# Patient Record
Sex: FEMALE | Race: BLACK OR AFRICAN AMERICAN | NOT HISPANIC OR LATINO | URBAN - METROPOLITAN AREA
[De-identification: names, ages, dates, MRNs, and addresses within clinical notes are randomized per-mention and may not be internally consistent; named-entity substitution may affect disease eponyms.]

---

## 2020-07-10 ENCOUNTER — INPATIENT (INPATIENT)
Facility: HOSPITAL | Age: 50
LOS: 3 days | Discharge: HOME HEALTH | End: 2020-07-14
Attending: HOSPITALIST | Admitting: HOSPITALIST
Payer: MEDICARE

## 2020-07-10 VITALS
OXYGEN SATURATION: 97 % | WEIGHT: 272.05 LBS | TEMPERATURE: 98 F | RESPIRATION RATE: 15 BRPM | HEIGHT: 68 IN | SYSTOLIC BLOOD PRESSURE: 122 MMHG | HEART RATE: 67 BPM | DIASTOLIC BLOOD PRESSURE: 60 MMHG

## 2020-07-10 LAB
ALBUMIN SERPL ELPH-MCNC: 3.2 G/DL — LOW (ref 3.3–5)
ALP SERPL-CCNC: 142 U/L — HIGH (ref 40–120)
ALT FLD-CCNC: 23 U/L — SIGNIFICANT CHANGE UP (ref 12–78)
ANION GAP SERPL CALC-SCNC: 7 MMOL/L — SIGNIFICANT CHANGE UP (ref 5–17)
AST SERPL-CCNC: 19 U/L — SIGNIFICANT CHANGE UP (ref 15–37)
BASOPHILS # BLD AUTO: 0.03 K/UL — SIGNIFICANT CHANGE UP (ref 0–0.2)
BASOPHILS NFR BLD AUTO: 0.2 % — SIGNIFICANT CHANGE UP (ref 0–2)
BILIRUB SERPL-MCNC: 0.5 MG/DL — SIGNIFICANT CHANGE UP (ref 0.2–1.2)
BUN SERPL-MCNC: 12 MG/DL — SIGNIFICANT CHANGE UP (ref 7–23)
CALCIUM SERPL-MCNC: 8.9 MG/DL — SIGNIFICANT CHANGE UP (ref 8.5–10.1)
CHLORIDE SERPL-SCNC: 104 MMOL/L — SIGNIFICANT CHANGE UP (ref 96–108)
CO2 SERPL-SCNC: 29 MMOL/L — SIGNIFICANT CHANGE UP (ref 22–31)
CREAT SERPL-MCNC: 0.72 MG/DL — SIGNIFICANT CHANGE UP (ref 0.5–1.3)
EOSINOPHIL # BLD AUTO: 0.08 K/UL — SIGNIFICANT CHANGE UP (ref 0–0.5)
EOSINOPHIL NFR BLD AUTO: 0.4 % — SIGNIFICANT CHANGE UP (ref 0–6)
GLUCOSE BLDC GLUCOMTR-MCNC: 126 MG/DL — HIGH (ref 70–99)
GLUCOSE BLDC GLUCOMTR-MCNC: 180 MG/DL — HIGH (ref 70–99)
GLUCOSE SERPL-MCNC: 174 MG/DL — HIGH (ref 70–99)
HCG SERPL-ACNC: <1 MIU/ML — SIGNIFICANT CHANGE UP
HCT VFR BLD CALC: 37.6 % — SIGNIFICANT CHANGE UP (ref 34.5–45)
HGB BLD-MCNC: 12.8 G/DL — SIGNIFICANT CHANGE UP (ref 11.5–15.5)
IMM GRANULOCYTES NFR BLD AUTO: 0.4 % — SIGNIFICANT CHANGE UP (ref 0–1.5)
LIDOCAIN IGE QN: 154 U/L — SIGNIFICANT CHANGE UP (ref 73–393)
LYMPHOCYTES # BLD AUTO: 1.29 K/UL — SIGNIFICANT CHANGE UP (ref 1–3.3)
LYMPHOCYTES # BLD AUTO: 7.2 % — LOW (ref 13–44)
MAGNESIUM SERPL-MCNC: 1.8 MG/DL — SIGNIFICANT CHANGE UP (ref 1.6–2.6)
MCHC RBC-ENTMCNC: 30.8 PG — SIGNIFICANT CHANGE UP (ref 27–34)
MCHC RBC-ENTMCNC: 34 GM/DL — SIGNIFICANT CHANGE UP (ref 32–36)
MCV RBC AUTO: 90.6 FL — SIGNIFICANT CHANGE UP (ref 80–100)
MONOCYTES # BLD AUTO: 0.96 K/UL — HIGH (ref 0–0.9)
MONOCYTES NFR BLD AUTO: 5.4 % — SIGNIFICANT CHANGE UP (ref 2–14)
NEUTROPHILS # BLD AUTO: 15.42 K/UL — HIGH (ref 1.8–7.4)
NEUTROPHILS NFR BLD AUTO: 86.4 % — HIGH (ref 43–77)
NRBC # BLD: 0 /100 WBCS — SIGNIFICANT CHANGE UP (ref 0–0)
PLATELET # BLD AUTO: 504 K/UL — HIGH (ref 150–400)
POTASSIUM SERPL-MCNC: 3.4 MMOL/L — LOW (ref 3.5–5.3)
POTASSIUM SERPL-SCNC: 3.4 MMOL/L — LOW (ref 3.5–5.3)
PROT SERPL-MCNC: 8 GM/DL — SIGNIFICANT CHANGE UP (ref 6–8.3)
RBC # BLD: 4.15 M/UL — SIGNIFICANT CHANGE UP (ref 3.8–5.2)
RBC # FLD: 14 % — SIGNIFICANT CHANGE UP (ref 10.3–14.5)
SARS-COV-2 RNA SPEC QL NAA+PROBE: SIGNIFICANT CHANGE UP
SODIUM SERPL-SCNC: 140 MMOL/L — SIGNIFICANT CHANGE UP (ref 135–145)
WBC # BLD: 17.86 K/UL — HIGH (ref 3.8–10.5)
WBC # FLD AUTO: 17.86 K/UL — HIGH (ref 3.8–10.5)

## 2020-07-10 PROCEDURE — 74177 CT ABD & PELVIS W/CONTRAST: CPT | Mod: 26

## 2020-07-10 PROCEDURE — 93010 ELECTROCARDIOGRAM REPORT: CPT

## 2020-07-10 PROCEDURE — 99285 EMERGENCY DEPT VISIT HI MDM: CPT

## 2020-07-10 PROCEDURE — 99223 1ST HOSP IP/OBS HIGH 75: CPT

## 2020-07-10 RX ORDER — CIPROFLOXACIN LACTATE 400MG/40ML
500 VIAL (ML) INTRAVENOUS ONCE
Refills: 0 | Status: DISCONTINUED | OUTPATIENT
Start: 2020-07-10 | End: 2020-07-10

## 2020-07-10 RX ORDER — SODIUM CHLORIDE 9 MG/ML
1000 INJECTION INTRAMUSCULAR; INTRAVENOUS; SUBCUTANEOUS ONCE
Refills: 0 | Status: COMPLETED | OUTPATIENT
Start: 2020-07-10 | End: 2020-07-10

## 2020-07-10 RX ORDER — METRONIDAZOLE 500 MG
500 TABLET ORAL ONCE
Refills: 0 | Status: DISCONTINUED | OUTPATIENT
Start: 2020-07-10 | End: 2020-07-10

## 2020-07-10 RX ORDER — KETOROLAC TROMETHAMINE 30 MG/ML
30 SYRINGE (ML) INJECTION ONCE
Refills: 0 | Status: DISCONTINUED | OUTPATIENT
Start: 2020-07-10 | End: 2020-07-10

## 2020-07-10 RX ORDER — POTASSIUM CHLORIDE 20 MEQ
40 PACKET (EA) ORAL EVERY 4 HOURS
Refills: 0 | Status: COMPLETED | OUTPATIENT
Start: 2020-07-10 | End: 2020-07-10

## 2020-07-10 RX ORDER — METOCLOPRAMIDE HCL 10 MG
10 TABLET ORAL ONCE
Refills: 0 | Status: COMPLETED | OUTPATIENT
Start: 2020-07-10 | End: 2020-07-10

## 2020-07-10 RX ORDER — LIDOCAINE 4 G/100G
1 CREAM TOPICAL ONCE
Refills: 0 | Status: COMPLETED | OUTPATIENT
Start: 2020-07-10 | End: 2020-07-10

## 2020-07-10 RX ORDER — LIDOCAINE 4 G/100G
1 CREAM TOPICAL DAILY
Refills: 0 | Status: DISCONTINUED | OUTPATIENT
Start: 2020-07-10 | End: 2020-07-14

## 2020-07-10 RX ORDER — HEPARIN SODIUM 5000 [USP'U]/ML
5000 INJECTION INTRAVENOUS; SUBCUTANEOUS EVERY 12 HOURS
Refills: 0 | Status: DISCONTINUED | OUTPATIENT
Start: 2020-07-10 | End: 2020-07-14

## 2020-07-10 RX ORDER — ONDANSETRON 8 MG/1
4 TABLET, FILM COATED ORAL EVERY 8 HOURS
Refills: 0 | Status: DISCONTINUED | OUTPATIENT
Start: 2020-07-10 | End: 2020-07-14

## 2020-07-10 RX ORDER — MORPHINE SULFATE 50 MG/1
6 CAPSULE, EXTENDED RELEASE ORAL ONCE
Refills: 0 | Status: DISCONTINUED | OUTPATIENT
Start: 2020-07-10 | End: 2020-07-10

## 2020-07-10 RX ORDER — CIPROFLOXACIN LACTATE 400MG/40ML
400 VIAL (ML) INTRAVENOUS ONCE
Refills: 0 | Status: COMPLETED | OUTPATIENT
Start: 2020-07-10 | End: 2020-07-10

## 2020-07-10 RX ORDER — MORPHINE SULFATE 50 MG/1
2 CAPSULE, EXTENDED RELEASE ORAL EVERY 4 HOURS
Refills: 0 | Status: DISCONTINUED | OUTPATIENT
Start: 2020-07-10 | End: 2020-07-14

## 2020-07-10 RX ORDER — ONDANSETRON 8 MG/1
4 TABLET, FILM COATED ORAL ONCE
Refills: 0 | Status: DISCONTINUED | OUTPATIENT
Start: 2020-07-10 | End: 2020-07-10

## 2020-07-10 RX ORDER — ARIPIPRAZOLE 15 MG/1
15 TABLET ORAL DAILY
Refills: 0 | Status: DISCONTINUED | OUTPATIENT
Start: 2020-07-10 | End: 2020-07-14

## 2020-07-10 RX ORDER — DEXTROSE MONOHYDRATE, SODIUM CHLORIDE, AND POTASSIUM CHLORIDE 50; .745; 4.5 G/1000ML; G/1000ML; G/1000ML
1000 INJECTION, SOLUTION INTRAVENOUS
Refills: 0 | Status: DISCONTINUED | OUTPATIENT
Start: 2020-07-10 | End: 2020-07-14

## 2020-07-10 RX ORDER — MORPHINE SULFATE 50 MG/1
4 CAPSULE, EXTENDED RELEASE ORAL ONCE
Refills: 0 | Status: DISCONTINUED | OUTPATIENT
Start: 2020-07-10 | End: 2020-07-10

## 2020-07-10 RX ORDER — INSULIN GLARGINE 100 [IU]/ML
25 INJECTION, SOLUTION SUBCUTANEOUS AT BEDTIME
Refills: 0 | Status: DISCONTINUED | OUTPATIENT
Start: 2020-07-10 | End: 2020-07-11

## 2020-07-10 RX ORDER — ASPIRIN/CALCIUM CARB/MAGNESIUM 324 MG
81 TABLET ORAL DAILY
Refills: 0 | Status: DISCONTINUED | OUTPATIENT
Start: 2020-07-10 | End: 2020-07-14

## 2020-07-10 RX ADMIN — LIDOCAINE 1 PATCH: 4 CREAM TOPICAL at 18:13

## 2020-07-10 RX ADMIN — MORPHINE SULFATE 6 MILLIGRAM(S): 50 CAPSULE, EXTENDED RELEASE ORAL at 11:08

## 2020-07-10 RX ADMIN — LIDOCAINE 1 PATCH: 4 CREAM TOPICAL at 11:04

## 2020-07-10 RX ADMIN — INSULIN GLARGINE 25 UNIT(S): 100 INJECTION, SOLUTION SUBCUTANEOUS at 22:16

## 2020-07-10 RX ADMIN — MORPHINE SULFATE 2 MILLIGRAM(S): 50 CAPSULE, EXTENDED RELEASE ORAL at 19:59

## 2020-07-10 RX ADMIN — HEPARIN SODIUM 5000 UNIT(S): 5000 INJECTION INTRAVENOUS; SUBCUTANEOUS at 18:12

## 2020-07-10 RX ADMIN — SODIUM CHLORIDE 1000 MILLILITER(S): 9 INJECTION INTRAMUSCULAR; INTRAVENOUS; SUBCUTANEOUS at 12:33

## 2020-07-10 RX ADMIN — DEXTROSE MONOHYDRATE, SODIUM CHLORIDE, AND POTASSIUM CHLORIDE 100 MILLILITER(S): 50; .745; 4.5 INJECTION, SOLUTION INTRAVENOUS at 22:17

## 2020-07-10 RX ADMIN — Medication 40 MILLIEQUIVALENT(S): at 18:12

## 2020-07-10 RX ADMIN — Medication 40 MILLIEQUIVALENT(S): at 22:17

## 2020-07-10 RX ADMIN — LIDOCAINE 1 PATCH: 4 CREAM TOPICAL at 23:08

## 2020-07-10 RX ADMIN — Medication 150 MILLIGRAM(S): at 18:12

## 2020-07-10 RX ADMIN — Medication 10 MILLIGRAM(S): at 11:08

## 2020-07-10 RX ADMIN — ONDANSETRON 4 MILLIGRAM(S): 8 TABLET, FILM COATED ORAL at 18:45

## 2020-07-10 RX ADMIN — Medication 200 MILLIGRAM(S): at 14:53

## 2020-07-10 RX ADMIN — MORPHINE SULFATE 4 MILLIGRAM(S): 50 CAPSULE, EXTENDED RELEASE ORAL at 14:53

## 2020-07-10 RX ADMIN — Medication 30 MILLIGRAM(S): at 14:53

## 2020-07-10 RX ADMIN — SODIUM CHLORIDE 1000 MILLILITER(S): 9 INJECTION INTRAMUSCULAR; INTRAVENOUS; SUBCUTANEOUS at 11:13

## 2020-07-10 NOTE — H&P ADULT - HISTORY OF PRESENT ILLNESS
50 year old female PMH intercostal neuralgia, obesity, HTN, p/w vomiting for past 24 hours. Notes she is having a flare of her intercostal neuralgia but as a result of covid concerns she hasn't been able to come to the hospital. She has been being treated by pmd in the clinic but that hasn't been enough over the past month. She notes severe pain in the right flank area up to the shoulder. When the flares get bad she develops nausea and vomiting. she also notes having loose stools at the moment. She notes she also has a diffuse HA that is typical for her.  Admitted for gastroenteritis and pain control.

## 2020-07-10 NOTE — H&P ADULT - ASSESSMENT
50 year old female PMH intercostal neuralgia, obesity, HTN, p/w vomiting for past 24 hours. Notes she is having a flare of her intercostal neuralgia but as a result of covid concerns she hasn't been able to come to the hospital. She has been being treated by pmd in the clinic but that hasn't been enough over the past month. She notes severe pain in the right flank area up to the shoulder. When the flares get bad she develops nausea and vomiting. she also notes having loose stools at the moment. She notes she also has a diffuse HA that is typical for her.  Admitted for gastroenteritis and pain control.     Ortho: Continue Lyrica 150 mg/day, MSO4 prn IVP and lidocaine patch.     GI: IVF and IV protonix.     Endo: Hold all oral agents, Lantus 25 units at night. 50 year old female PMH intercostal neuralgia, obesity, HTN, p/w vomiting for past 24 hours. Notes she is having a flare of her intercostal neuralgia but as a result of covid concerns she hasn't been able to come to the hospital. She has been being treated by pmd in the clinic but that hasn't been enough over the past month. She notes severe pain in the right flank area up to the shoulder. When the flares get bad she develops nausea and vomiting. she also notes having loose stools at the moment. She notes she also has a diffuse HA that is typical for her.  Admitted for gastroenteritis and pain control.     Neuro: Continue Lyrica 150 mg/day, MSO4 prn IVP and lidocaine patch. Continue Ambilify 15 mg/day at home dose.      GI: CT confirms gastroenteritis. IVF and IV protonix. Replace KCL. Advance diet as tolerated.     Endo: Hold all oral agents, Lantus 25 units at night.

## 2020-07-10 NOTE — H&P ADULT - NSHPPHYSICALEXAM_GEN_ALL_CORE
PHYSICAL EXAMINATION:  Vital Signs Last 24 Hrs  T(C): 36.5 (10 Jul 2020 14:36), Max: 36.5 (10 Jul 2020 14:36)  T(F): 97.7 (10 Jul 2020 14:36), Max: 97.7 (10 Jul 2020 14:36)  HR: 80 (10 Jul 2020 14:36) (67 - 80)  BP: 120/66 (10 Jul 2020 14:36) (105/53 - 122/60)  BP(mean): --  RR: 20 (10 Jul 2020 14:36) (15 - 20)  SpO2: 99% (10 Jul 2020 14:36) (97% - 99%)  CAPILLARY BLOOD GLUCOSE      POCT Blood Glucose.: 180 mg/dL (10 Jul 2020 10:44)      GENERAL: NAD, well-groomed, well-developed  HEAD:  atraumatic, normocephalic  EYES: sclera anicteric  ENMT: mucous membranes moist  NECK: supple, No JVD  CHEST/LUNG: clear to auscultation bilaterally; no rales, rhonchi, or wheezing b/l  HEART: normal S1, S2  ABDOMEN: BS+, soft, ND, NT   EXTREMITIES:  pulses palpable; no clubbing, cyanosis, or edema b/l LEs  NEURO: awake, alert, interactive; moves all extremities  SKIN: no rashes or lesions PHYSICAL EXAMINATION:  Vital Signs Last 24 Hrs  T(C): 36.5 (10 Jul 2020 14:36), Max: 36.5 (10 Jul 2020 14:36)  T(F): 97.7 (10 Jul 2020 14:36), Max: 97.7 (10 Jul 2020 14:36)  HR: 80 (10 Jul 2020 14:36) (67 - 80)  BP: 120/66 (10 Jul 2020 14:36) (105/53 - 122/60)  BP(mean): --  RR: 20 (10 Jul 2020 14:36) (15 - 20)  SpO2: 99% (10 Jul 2020 14:36) (97% - 99%)  CAPILLARY BLOOD GLUCOSE      POCT Blood Glucose.: 180 mg/dL (10 Jul 2020 10:44)      GENERAL: NAD, well-groomed, well-developed, seen in ER,   HEAD:  atraumatic, normocephalic  EYES: sclera anicteric  ENMT: mucous membranes moist  NECK: supple, No JVD  CHEST/LUNG: clear to auscultation bilaterally; no rales, rhonchi, or wheezing b/l  HEART: normal S1, S2  ABDOMEN: BS+, soft, ND, NT   EXTREMITIES:  pulses palpable; no clubbing, cyanosis, or edema b/l LEs  NEURO: awake, alert, interactive; moves all extremities  SKIN: no rashes or lesions

## 2020-07-10 NOTE — ED ADULT NURSE REASSESSMENT NOTE - NS ED NURSE REASSESS COMMENT FT1
patient A&Ox3 in no acute distress no pain at this time no headache no N/V at this time , continue to monitor

## 2020-07-10 NOTE — ED PROVIDER NOTE - CLINICAL SUMMARY MEDICAL DECISION MAKING FREE TEXT BOX
chronic pain disorder with nausea.. will treat with analgesia and anti-emetics and reassess. chronic pain disorder with nausea.. will treat with analgesia and anti-emetics and reassess.  I read ekg as nsr rate 65, no st elevation or depression, qtc 480, narrow qrs, normal axis, no t inversions. chronic pain disorder with nausea.. will treat with analgesia and anti-emetics and reassess.  I read ekg as nsr rate 65, no st elevation or depression, qtc 480, narrow qrs, normal axis, no t inversions.  pt unable to tolerate po. ct shows gastroeneteritis. will start on iv abx and admit.

## 2020-07-10 NOTE — PHARMACY COMMUNICATION NOTE - COMMENTS
MD called and placed a telephone order for abilify 15mg daily. MD went over name and birthdate and location before placing telephone order.

## 2020-07-10 NOTE — SBIRT NOTE ADULT - NSSBIRTDRGBRIEFINTDET_GEN_A_CORE
Provided SBIRT services: Full screen positive. Brief Intervention Performed. Screening results were reviewed with the patient and patient was provided information about healthy guidelines and potential negative consequences associated with level of risk. Motivation and readiness to reduce or stop use was discussed and goals and activities to make changes were suggested/offered. Naloxone Rescue Kit dispensed: VS-017, expiration 12/20. Pt educated on utilizing kit and administering NARCAN.

## 2020-07-10 NOTE — H&P ADULT - NSHPLABSRESULTS_GEN_ALL_CORE
LABS:                        12.8   17.86 )-----------( 504      ( 10 Jul 2020 11:00 )             37.6     07-10    140  |  104  |  12  ----------------------------<  174<H>  3.4<L>   |  29  |  0.72    Ca    8.9      10 Jul 2020 11:00  Mg     1.8     07-10    TPro  8.0  /  Alb  3.2<L>  /  TBili  0.5  /  DBili  x   /  AST  19  /  ALT  23  /  AlkPhos  142<H>  07-10            RADIOLOGY & ADDITIONAL TESTS:

## 2020-07-10 NOTE — PATIENT PROFILE ADULT - DISASTER - NSPROGENBLOODRESTRICT_GEN_A_NUR
Lung Anatomy  Your lungs take air in to give your body oxygen, which the body needs to work. Your lungs, like all the tissues in your body, are made up of billions of tiny specialized cells. Old lung cells die and are replaced by new, identical lung cells. This natural process helps ensure healthy lungs.    Date Last Reviewed: 11/1/2016  © 6428-9644 [employer]. [address] [place] [address], [place] [address]. All rights reserved. This information is not intended as a substitute for professional medical care. Always follow your healthcare professional's instructions.         none

## 2020-07-10 NOTE — ED PROVIDER NOTE - OBJECTIVE STATEMENT
50F hx intercostal neuralgia, obesity, htn pw vomiting. notes she is having a flare of her intercostal neuralgia but as a result of covid she hasn't been able to come to the hospital. she has been being treated by pmd in the clinic but that hasn't been enough over the past month. she notes severe pain in the right flank area up to the shoulder. when the flares get bad she develops nausea and vomiting. she also notes having loose stools at the moment. she notes she also has a diffuse ha that is typical for her. ros neg for vision loss, rhinorrhea, cp, sob, abd pain, dysuria, numbness, rash, bleeding  Fh and Sh not otherwise contributory  ROS otherwise negative

## 2020-07-10 NOTE — ED ADULT NURSE NOTE - OBJECTIVE STATEMENT
patient A&Ox3 in no acute distress ,c/o of r shoulder and R upper back pain started yesterday , patient denied chest pain , c/o of headache and N/V denied fever denied cough denied difficulty breathing at this time , Md aware

## 2020-07-10 NOTE — ED PROVIDER NOTE - CARE PLAN
Principal Discharge DX:	Chronic pain disorder Principal Discharge DX:	Chronic pain disorder  Secondary Diagnosis:	Gastroenteritis

## 2020-07-11 DIAGNOSIS — E87.6 HYPOKALEMIA: ICD-10-CM

## 2020-07-11 DIAGNOSIS — K52.9 NONINFECTIVE GASTROENTERITIS AND COLITIS, UNSPECIFIED: ICD-10-CM

## 2020-07-11 DIAGNOSIS — G89.4 CHRONIC PAIN SYNDROME: ICD-10-CM

## 2020-07-11 LAB
ANION GAP SERPL CALC-SCNC: 6 MMOL/L — SIGNIFICANT CHANGE UP (ref 5–17)
BUN SERPL-MCNC: 11 MG/DL — SIGNIFICANT CHANGE UP (ref 7–23)
CALCIUM SERPL-MCNC: 8.7 MG/DL — SIGNIFICANT CHANGE UP (ref 8.5–10.1)
CHLORIDE SERPL-SCNC: 105 MMOL/L — SIGNIFICANT CHANGE UP (ref 96–108)
CO2 SERPL-SCNC: 29 MMOL/L — SIGNIFICANT CHANGE UP (ref 22–31)
CREAT SERPL-MCNC: 0.66 MG/DL — SIGNIFICANT CHANGE UP (ref 0.5–1.3)
GLUCOSE BLDC GLUCOMTR-MCNC: 105 MG/DL — HIGH (ref 70–99)
GLUCOSE BLDC GLUCOMTR-MCNC: 111 MG/DL — HIGH (ref 70–99)
GLUCOSE BLDC GLUCOMTR-MCNC: 112 MG/DL — HIGH (ref 70–99)
GLUCOSE BLDC GLUCOMTR-MCNC: 99 MG/DL — SIGNIFICANT CHANGE UP (ref 70–99)
GLUCOSE SERPL-MCNC: 99 MG/DL — SIGNIFICANT CHANGE UP (ref 70–99)
HCT VFR BLD CALC: 36.1 % — SIGNIFICANT CHANGE UP (ref 34.5–45)
HGB BLD-MCNC: 11.6 G/DL — SIGNIFICANT CHANGE UP (ref 11.5–15.5)
MCHC RBC-ENTMCNC: 30.3 PG — SIGNIFICANT CHANGE UP (ref 27–34)
MCHC RBC-ENTMCNC: 32.1 GM/DL — SIGNIFICANT CHANGE UP (ref 32–36)
MCV RBC AUTO: 94.3 FL — SIGNIFICANT CHANGE UP (ref 80–100)
NRBC # BLD: 0 /100 WBCS — SIGNIFICANT CHANGE UP (ref 0–0)
PLATELET # BLD AUTO: 448 K/UL — HIGH (ref 150–400)
POTASSIUM SERPL-MCNC: 3.2 MMOL/L — LOW (ref 3.5–5.3)
POTASSIUM SERPL-SCNC: 3.2 MMOL/L — LOW (ref 3.5–5.3)
RBC # BLD: 3.83 M/UL — SIGNIFICANT CHANGE UP (ref 3.8–5.2)
RBC # FLD: 14.1 % — SIGNIFICANT CHANGE UP (ref 10.3–14.5)
SARS-COV-2 IGG SERPL QL IA: NEGATIVE — SIGNIFICANT CHANGE UP
SARS-COV-2 IGM SERPL IA-ACNC: <0.1 INDEX — SIGNIFICANT CHANGE UP
SODIUM SERPL-SCNC: 140 MMOL/L — SIGNIFICANT CHANGE UP (ref 135–145)
WBC # BLD: 12.13 K/UL — HIGH (ref 3.8–10.5)
WBC # FLD AUTO: 12.13 K/UL — HIGH (ref 3.8–10.5)

## 2020-07-11 PROCEDURE — 99233 SBSQ HOSP IP/OBS HIGH 50: CPT

## 2020-07-11 RX ORDER — METRONIDAZOLE 500 MG
500 TABLET ORAL ONCE
Refills: 0 | Status: COMPLETED | OUTPATIENT
Start: 2020-07-11 | End: 2020-07-11

## 2020-07-11 RX ORDER — PANTOPRAZOLE SODIUM 20 MG/1
40 TABLET, DELAYED RELEASE ORAL
Refills: 0 | Status: DISCONTINUED | OUTPATIENT
Start: 2020-07-11 | End: 2020-07-11

## 2020-07-11 RX ORDER — METRONIDAZOLE 500 MG
TABLET ORAL
Refills: 0 | Status: DISCONTINUED | OUTPATIENT
Start: 2020-07-11 | End: 2020-07-14

## 2020-07-11 RX ORDER — INSULIN GLARGINE 100 [IU]/ML
25 INJECTION, SOLUTION SUBCUTANEOUS AT BEDTIME
Refills: 0 | Status: DISCONTINUED | OUTPATIENT
Start: 2020-07-12 | End: 2020-07-14

## 2020-07-11 RX ORDER — POTASSIUM CHLORIDE 20 MEQ
40 PACKET (EA) ORAL EVERY 4 HOURS
Refills: 0 | Status: COMPLETED | OUTPATIENT
Start: 2020-07-11 | End: 2020-07-11

## 2020-07-11 RX ORDER — INSULIN GLARGINE 100 [IU]/ML
12 INJECTION, SOLUTION SUBCUTANEOUS AT BEDTIME
Refills: 0 | Status: COMPLETED | OUTPATIENT
Start: 2020-07-11 | End: 2020-07-11

## 2020-07-11 RX ORDER — METRONIDAZOLE 500 MG
500 TABLET ORAL EVERY 8 HOURS
Refills: 0 | Status: DISCONTINUED | OUTPATIENT
Start: 2020-07-11 | End: 2020-07-14

## 2020-07-11 RX ORDER — SIMETHICONE 80 MG/1
80 TABLET, CHEWABLE ORAL THREE TIMES A DAY
Refills: 0 | Status: DISCONTINUED | OUTPATIENT
Start: 2020-07-11 | End: 2020-07-14

## 2020-07-11 RX ORDER — PANTOPRAZOLE SODIUM 20 MG/1
40 TABLET, DELAYED RELEASE ORAL EVERY 12 HOURS
Refills: 0 | Status: DISCONTINUED | OUTPATIENT
Start: 2020-07-11 | End: 2020-07-14

## 2020-07-11 RX ADMIN — Medication 40 MILLIEQUIVALENT(S): at 13:18

## 2020-07-11 RX ADMIN — ARIPIPRAZOLE 15 MILLIGRAM(S): 15 TABLET ORAL at 12:29

## 2020-07-11 RX ADMIN — Medication 81 MILLIGRAM(S): at 12:29

## 2020-07-11 RX ADMIN — SIMETHICONE 80 MILLIGRAM(S): 80 TABLET, CHEWABLE ORAL at 14:39

## 2020-07-11 RX ADMIN — LIDOCAINE 1 PATCH: 4 CREAM TOPICAL at 21:18

## 2020-07-11 RX ADMIN — MORPHINE SULFATE 2 MILLIGRAM(S): 50 CAPSULE, EXTENDED RELEASE ORAL at 08:51

## 2020-07-11 RX ADMIN — ONDANSETRON 4 MILLIGRAM(S): 8 TABLET, FILM COATED ORAL at 08:51

## 2020-07-11 RX ADMIN — HEPARIN SODIUM 5000 UNIT(S): 5000 INJECTION INTRAVENOUS; SUBCUTANEOUS at 17:32

## 2020-07-11 RX ADMIN — Medication 40 MILLIEQUIVALENT(S): at 21:22

## 2020-07-11 RX ADMIN — DEXTROSE MONOHYDRATE, SODIUM CHLORIDE, AND POTASSIUM CHLORIDE 100 MILLILITER(S): 50; .745; 4.5 INJECTION, SOLUTION INTRAVENOUS at 08:51

## 2020-07-11 RX ADMIN — DEXTROSE MONOHYDRATE, SODIUM CHLORIDE, AND POTASSIUM CHLORIDE 100 MILLILITER(S): 50; .745; 4.5 INJECTION, SOLUTION INTRAVENOUS at 22:17

## 2020-07-11 RX ADMIN — MORPHINE SULFATE 2 MILLIGRAM(S): 50 CAPSULE, EXTENDED RELEASE ORAL at 13:17

## 2020-07-11 RX ADMIN — PANTOPRAZOLE SODIUM 40 MILLIGRAM(S): 20 TABLET, DELAYED RELEASE ORAL at 14:39

## 2020-07-11 RX ADMIN — MORPHINE SULFATE 2 MILLIGRAM(S): 50 CAPSULE, EXTENDED RELEASE ORAL at 20:48

## 2020-07-11 RX ADMIN — INSULIN GLARGINE 12 UNIT(S): 100 INJECTION, SOLUTION SUBCUTANEOUS at 22:13

## 2020-07-11 RX ADMIN — Medication 100 MILLIGRAM(S): at 10:51

## 2020-07-11 RX ADMIN — Medication 150 MILLIGRAM(S): at 17:32

## 2020-07-11 RX ADMIN — Medication 100 MILLIGRAM(S): at 21:22

## 2020-07-11 RX ADMIN — Medication 40 MILLIEQUIVALENT(S): at 17:33

## 2020-07-11 RX ADMIN — LIDOCAINE 1 PATCH: 4 CREAM TOPICAL at 12:29

## 2020-07-11 RX ADMIN — HEPARIN SODIUM 5000 UNIT(S): 5000 INJECTION INTRAVENOUS; SUBCUTANEOUS at 06:06

## 2020-07-11 RX ADMIN — Medication 150 MILLIGRAM(S): at 06:07

## 2020-07-11 NOTE — PROGRESS NOTE ADULT - ASSESSMENT
50 year old female PMH intercostal neuralgia, obesity, HTN, p/w vomiting for past 24 hours. Notes she is having a flare of her intercostal neuralgia but as a result of covid concerns she hasn't been able to come to the hospital. She has been being treated by pmd in the clinic but that hasn't been enough over the past month. She notes severe pain in the right flank area up to the shoulder. When the flares get bad she develops nausea and vomiting. she also notes having loose stools at the moment. She notes she also has a diffuse HA that is typical for her.  Admitted for gastroenteritis and pain control.     Neuro: Continue Lyrica 150 mg/day, MSO4 prn IVP and lidocaine patch. Continue Ambilify 15 mg/day at home dose.      GI: CT confirms gastroenteritis. IVF and IV protonix. Replace KCL. Advance diet as tolerated.     Endo: Hold all oral agents, Lantus 25 units at night.

## 2020-07-11 NOTE — PROGRESS NOTE ADULT - SUBJECTIVE AND OBJECTIVE BOX
HPI:  50 year old female PMH intercostal neuralgia, obesity, HTN, p/w vomiting for past 24 hours. Notes she is having a flare of her intercostal neuralgia but as a result of covid concerns she hasn't been able to come to the hospital. She has been being treated by pmd in the clinic but that hasn't been enough over the past month. She notes severe pain in the right flank area up to the shoulder. When the flares get bad she develops nausea and vomiting. she also notes having loose stools at the moment. She notes she also has a diffuse HA that is typical for her.  Admitted for gastroenteritis and pain control. (10 Jul 2020 15:32)    Patient is a 50y old  Female who presents with a chief complaint of Nausea, vomiting, likely gastroenteritis (10 Jul 2020 15:32)      INTERVAL HPI/OVERNIGHT EVENTS: new admission feels a little better    MEDICATIONS  (STANDING):  ARIPiprazole 15 milliGRAM(s) Oral daily  aspirin enteric coated 81 milliGRAM(s) Oral daily  enalapril 10 milliGRAM(s) Oral daily  heparin   Injectable 5000 Unit(s) SubCutaneous every 12 hours  insulin glargine Injectable (LANTUS) 25 Unit(s) SubCutaneous at bedtime  levoFLOXacin IVPB      lidocaine   Patch 1 Patch Transdermal daily  metroNIDAZOLE  IVPB      metroNIDAZOLE  IVPB 500 milliGRAM(s) IV Intermittent every 8 hours  pantoprazole    Tablet 40 milliGRAM(s) Oral before breakfast  potassium chloride    Tablet ER 40 milliEquivalent(s) Oral every 4 hours  pregabalin 150 milliGRAM(s) Oral two times a day  sodium chloride 0.45% with potassium chloride 20 mEq/L 1000 milliLiter(s) (100 mL/Hr) IV Continuous <Continuous>    MEDICATIONS  (PRN):  morphine  - Injectable 2 milliGRAM(s) IV Push every 4 hours PRN Mild Pain (1 - 3)  ondansetron Injectable 4 milliGRAM(s) IV Push every 8 hours PRN Nausea and/or Vomiting      Allergies    amitriptyline (Other)    Intolerances        REVIEW OF SYSTEMS:  CONSTITUTIONAL:  fatigue  EYES: No eye pain, visual disturbances, or discharge  ENMT:  No difficulty hearing, tinnitus, vertigo; No sinus or throat pain  NECK: No pain or stiffness  BREASTS: No pain, masses, or nipple discharge  RESPIRATORY: No cough, wheezing, chills or hemoptysis; No shortness of breath  CARDIOVASCULAR: No chest pain, palpitations, dizziness, or leg swelling  GASTROINTESTINAL:  abdominal and epigastric pain. nausea, vomiting, no  hematemesis;  diarrhea No melena or hematochezia.  GENITOURINARY: No dysuria, frequency, hematuria, or incontinence  NEUROLOGICAL: No headaches, memory loss, loss of strength, numbness, or tremors  SKIN: No itching, burning, rashes, or lesions   LYMPH NODES: No enlarged glands  ENDOCRINE: No heat or cold intolerance; No hair loss  MUSCULOSKELETAL: No joint pain or swelling; No muscle, back, or extremity pain  PSYCHIATRIC: No depression, anxiety, mood swings, or difficulty sleeping  HEME/LYMPH: No easy bruising, or bleeding gums  ALLERGY AND IMMUNOLOGIC: No hives or eczema    Vital Signs Last 24 Hrs  T(C): 36.7 (11 Jul 2020 10:41), Max: 36.9 (11 Jul 2020 05:50)  T(F): 98 (11 Jul 2020 10:41), Max: 98.5 (11 Jul 2020 05:50)  HR: 70 (11 Jul 2020 10:41) (57 - 80)  BP: 107/51 (11 Jul 2020 10:41) (96/46 - 137/72)  BP(mean): --  RR: 16 (11 Jul 2020 10:41) (16 - 20)  SpO2: 98% (11 Jul 2020 10:41) (98% - 100%)    PHYSICAL EXAM:  GENERAL: NAD, well-groomed, well-developed  HEAD:  Atraumatic, Normocephalic  EYES: EOMI, PERRLA, conjunctiva and sclera clear  ENMT: No tonsillar erythema, exudates, or enlargement; Moist mucous membranes, Good dentition, No lesions  NECK: Supple, No JVD, Normal thyroid  NERVOUS SYSTEM:  Alert & Oriented X3, Good concentration; Motor Strength 5/5 B/L upper and lower extremities; DTRs 2+ intact and symmetric  CHEST/LUNG: Clear to percussion bilaterally; No rales, rhonchi, wheezing, or rubs  HEART: Regular rate and rhythm; No murmurs, rubs, or gallops  ABDOMEN: Soft, Nontender, Nondistended; Bowel sounds present  EXTREMITIES:  2+ Peripheral Pulses, No clubbing, cyanosis, or edema  LYMPH: No lymphadenopathy noted  SKIN: No rashes or lesions    LABS:                        11.6   12.13 )-----------( 448      ( 11 Jul 2020 07:27 )             36.1     07-11    140  |  105  |  11  ----------------------------<  99  3.2<L>   |  29  |  0.66    Ca    8.7      11 Jul 2020 07:27  Mg     1.8     07-10    TPro  8.0  /  Alb  3.2<L>  /  TBili  0.5  /  DBili  x   /  AST  19  /  ALT  23  /  AlkPhos  142<H>  07-10        CAPILLARY BLOOD GLUCOSE      POCT Blood Glucose.: 105 mg/dL (11 Jul 2020 07:15)  POCT Blood Glucose.: 126 mg/dL (10 Jul 2020 21:19)      RADIOLOGY & ADDITIONAL TESTS:    Imaging Personally Reviewed:  [X] YES  [ ] NO    Consultant(s) Notes Reviewed:  [ X] YES  [ ] NO    Care Discussed with Consultants/Other Providers [ X] YES  [ ] NO

## 2020-07-12 LAB
ALBUMIN SERPL ELPH-MCNC: 2.9 G/DL — LOW (ref 3.3–5)
ALP SERPL-CCNC: 128 U/L — HIGH (ref 40–120)
ALT FLD-CCNC: 25 U/L — SIGNIFICANT CHANGE UP (ref 12–78)
ANION GAP SERPL CALC-SCNC: 5 MMOL/L — SIGNIFICANT CHANGE UP (ref 5–17)
AST SERPL-CCNC: 21 U/L — SIGNIFICANT CHANGE UP (ref 15–37)
BILIRUB SERPL-MCNC: 0.6 MG/DL — SIGNIFICANT CHANGE UP (ref 0.2–1.2)
BUN SERPL-MCNC: 7 MG/DL — SIGNIFICANT CHANGE UP (ref 7–23)
CALCIUM SERPL-MCNC: 8.8 MG/DL — SIGNIFICANT CHANGE UP (ref 8.5–10.1)
CHLORIDE SERPL-SCNC: 108 MMOL/L — SIGNIFICANT CHANGE UP (ref 96–108)
CO2 SERPL-SCNC: 27 MMOL/L — SIGNIFICANT CHANGE UP (ref 22–31)
CREAT SERPL-MCNC: 0.65 MG/DL — SIGNIFICANT CHANGE UP (ref 0.5–1.3)
GLUCOSE BLDC GLUCOMTR-MCNC: 149 MG/DL — HIGH (ref 70–99)
GLUCOSE BLDC GLUCOMTR-MCNC: 156 MG/DL — HIGH (ref 70–99)
GLUCOSE BLDC GLUCOMTR-MCNC: 166 MG/DL — HIGH (ref 70–99)
GLUCOSE BLDC GLUCOMTR-MCNC: 96 MG/DL — SIGNIFICANT CHANGE UP (ref 70–99)
GLUCOSE SERPL-MCNC: 81 MG/DL — SIGNIFICANT CHANGE UP (ref 70–99)
HCT VFR BLD CALC: 37.2 % — SIGNIFICANT CHANGE UP (ref 34.5–45)
HGB BLD-MCNC: 11.5 G/DL — SIGNIFICANT CHANGE UP (ref 11.5–15.5)
MAGNESIUM SERPL-MCNC: 2.2 MG/DL — SIGNIFICANT CHANGE UP (ref 1.6–2.6)
MCHC RBC-ENTMCNC: 29.9 PG — SIGNIFICANT CHANGE UP (ref 27–34)
MCHC RBC-ENTMCNC: 30.9 GM/DL — LOW (ref 32–36)
MCV RBC AUTO: 96.6 FL — SIGNIFICANT CHANGE UP (ref 80–100)
NRBC # BLD: 0 /100 WBCS — SIGNIFICANT CHANGE UP (ref 0–0)
PHOSPHATE SERPL-MCNC: 2.9 MG/DL — SIGNIFICANT CHANGE UP (ref 2.5–4.5)
PLATELET # BLD AUTO: 450 K/UL — HIGH (ref 150–400)
POTASSIUM SERPL-MCNC: 4.1 MMOL/L — SIGNIFICANT CHANGE UP (ref 3.5–5.3)
POTASSIUM SERPL-SCNC: 4.1 MMOL/L — SIGNIFICANT CHANGE UP (ref 3.5–5.3)
PROT SERPL-MCNC: 7.3 GM/DL — SIGNIFICANT CHANGE UP (ref 6–8.3)
RBC # BLD: 3.85 M/UL — SIGNIFICANT CHANGE UP (ref 3.8–5.2)
RBC # FLD: 13.8 % — SIGNIFICANT CHANGE UP (ref 10.3–14.5)
SODIUM SERPL-SCNC: 140 MMOL/L — SIGNIFICANT CHANGE UP (ref 135–145)
WBC # BLD: 9.58 K/UL — SIGNIFICANT CHANGE UP (ref 3.8–10.5)
WBC # FLD AUTO: 9.58 K/UL — SIGNIFICANT CHANGE UP (ref 3.8–10.5)

## 2020-07-12 PROCEDURE — 99233 SBSQ HOSP IP/OBS HIGH 50: CPT

## 2020-07-12 RX ORDER — SERTRALINE 25 MG/1
25 TABLET, FILM COATED ORAL DAILY
Refills: 0 | Status: DISCONTINUED | OUTPATIENT
Start: 2020-07-12 | End: 2020-07-12

## 2020-07-12 RX ORDER — SERTRALINE 25 MG/1
50 TABLET, FILM COATED ORAL DAILY
Refills: 0 | Status: DISCONTINUED | OUTPATIENT
Start: 2020-07-12 | End: 2020-07-14

## 2020-07-12 RX ORDER — SERTRALINE 25 MG/1
1 TABLET, FILM COATED ORAL
Qty: 0 | Refills: 0 | DISCHARGE

## 2020-07-12 RX ADMIN — SERTRALINE 50 MILLIGRAM(S): 25 TABLET, FILM COATED ORAL at 17:14

## 2020-07-12 NOTE — PROGRESS NOTE ADULT - ASSESSMENT
50 year old female PMH intercostal neuralgia, obesity, HTN, p/w vomiting for past 24 hours. Notes she is having a flare of her intercostal neuralgia but as a result of covid concerns she hasn't been able to come to the hospital. She has been being treated by pmd in the clinic but that hasn't been enough over the past month. She notes severe pain in the right flank area up to the shoulder. When the flares get bad she develops nausea and vomiting. she also notes having loose stools at the moment. She notes she also has a diffuse HA that is typical for her.  Admitted for gastroenteritis and pain control.     Neuro: Continue Lyrica 150 mg/day, MSO4 prn IVP and lidocaine patch. Continue Ambilify 15 mg/day at home dose.  continue Zoloft 50     GI: CT confirms gastroenteritis. IVF and IV protonix.  Advance diet as tolerated.   Hypokalemia: replaced continue to monitor     Endo: Hold all oral agents, Lantus 25 units at night.  check A1c

## 2020-07-12 NOTE — PROGRESS NOTE ADULT - SUBJECTIVE AND OBJECTIVE BOX
HPI:  50 year old female PMH intercostal neuralgia, obesity, HTN, p/w vomiting for past 24 hours. Notes she is having a flare of her intercostal neuralgia but as a result of covid concerns she hasn't been able to come to the hospital. She has been being treated by pmd in the clinic but that hasn't been enough over the past month. She notes severe pain in the right flank area up to the shoulder. When the flares get bad she develops nausea and vomiting. she also notes having loose stools at the moment. She notes she also has a diffuse HA that is typical for her.  Admitted for gastroenteritis and pain control. (10 Jul 2020 15:32)  Patient is a 50y old  Female who presents with a chief complaint of Nausea, vomiting, likely gastroenteritis (11 Jul 2020 11:30)      INTERVAL HPI/OVERNIGHT EVENTS: no acute events overnight feels much better tolerating diet     MEDICATIONS  (STANDING):  ARIPiprazole 15 milliGRAM(s) Oral daily  aspirin enteric coated 81 milliGRAM(s) Oral daily  enalapril 10 milliGRAM(s) Oral daily  heparin   Injectable 5000 Unit(s) SubCutaneous every 12 hours  insulin glargine Injectable (LANTUS) 25 Unit(s) SubCutaneous at bedtime  levoFLOXacin IVPB      levoFLOXacin IVPB 500 milliGRAM(s) IV Intermittent every 24 hours  lidocaine   Patch 1 Patch Transdermal daily  metroNIDAZOLE  IVPB      metroNIDAZOLE  IVPB 500 milliGRAM(s) IV Intermittent every 8 hours  pantoprazole  Injectable 40 milliGRAM(s) IV Push every 12 hours  pregabalin 150 milliGRAM(s) Oral two times a day  sertraline 50 milliGRAM(s) Oral daily  sodium chloride 0.45% with potassium chloride 20 mEq/L 1000 milliLiter(s) (100 mL/Hr) IV Continuous <Continuous>    MEDICATIONS  (PRN):  morphine  - Injectable 2 milliGRAM(s) IV Push every 4 hours PRN Mild Pain (1 - 3)  ondansetron Injectable 4 milliGRAM(s) IV Push every 8 hours PRN Nausea and/or Vomiting  simethicone 80 milliGRAM(s) Chew three times a day PRN Gas      Allergies    amitriptyline (Other)    Intolerances        REVIEW OF SYSTEMS:  CONSTITUTIONAL: No fever, weight loss, or fatigue  EYES: No eye pain, visual disturbances, or discharge  ENMT:  No difficulty hearing, tinnitus, vertigo; No sinus or throat pain  NECK: No pain or stiffness  BREASTS: No pain, masses, or nipple discharge  RESPIRATORY: No cough, wheezing, chills or hemoptysis; No shortness of breath  CARDIOVASCULAR: No chest pain, palpitations, dizziness, or leg swelling  GASTROINTESTINAL: No abdominal or epigastric pain. No nausea, vomiting, or hematemesis; No diarrhea or constipation. No melena or hematochezia.  GENITOURINARY: No dysuria, frequency, hematuria, or incontinence  NEUROLOGICAL: No headaches, memory loss, loss of strength, numbness, or tremors  SKIN: No itching, burning, rashes, or lesions   LYMPH NODES: No enlarged glands  ENDOCRINE: No heat or cold intolerance; No hair loss  MUSCULOSKELETAL: No joint pain or swelling; No muscle, back, or extremity pain  PSYCHIATRIC: No depression, anxiety, mood swings, or difficulty sleeping  HEME/LYMPH: No easy bruising, or bleeding gums  ALLERGY AND IMMUNOLOGIC: No hives or eczema    Vital Signs Last 24 Hrs  T(C): 36.7 (12 Jul 2020 13:18), Max: 36.9 (12 Jul 2020 05:40)  T(F): 98 (12 Jul 2020 13:18), Max: 98.4 (12 Jul 2020 05:40)  HR: 62 (12 Jul 2020 17:28) (57 - 67)  BP: 151/76 (12 Jul 2020 17:28) (116/57 - 151/76)  BP(mean): --  RR: 18 (12 Jul 2020 17:28) (17 - 18)  SpO2: 99% (12 Jul 2020 17:28) (97% - 99%)    PHYSICAL EXAM:  GENERAL: NAD, well-groomed, well-developed  HEAD:  Atraumatic, Normocephalic  EYES: EOMI, PERRLA, conjunctiva and sclera clear  ENMT: No tonsillar erythema, exudates, or enlargement; Moist mucous membranes, Good dentition, No lesions  NECK: Supple, No JVD, Normal thyroid  NERVOUS SYSTEM:  Alert & Oriented X3, Good concentration; Motor Strength 5/5 B/L upper and lower extremities; DTRs 2+ intact and symmetric  CHEST/LUNG: Clear to percussion bilaterally; No rales, rhonchi, wheezing, or rubs  HEART: Regular rate and rhythm; No murmurs, rubs, or gallops  ABDOMEN: Soft, Nontender, Nondistended; Bowel sounds present  EXTREMITIES:  2+ Peripheral Pulses, No clubbing, cyanosis, or edema  LYMPH: No lymphadenopathy noted  SKIN: No rashes or lesions    LABS:                        11.5   9.58  )-----------( 450      ( 12 Jul 2020 08:12 )             37.2     07-12    140  |  108  |  7   ----------------------------<  81  4.1   |  27  |  0.65    Ca    8.8      12 Jul 2020 08:12  Phos  2.9     07-12  Mg     2.2     07-12    TPro  7.3  /  Alb  2.9<L>  /  TBili  0.6  /  DBili  x   /  AST  21  /  ALT  25  /  AlkPhos  128<H>  07-12        CAPILLARY BLOOD GLUCOSE      POCT Blood Glucose.: 166 mg/dL (12 Jul 2020 21:29)  POCT Blood Glucose.: 156 mg/dL (12 Jul 2020 16:35)  POCT Blood Glucose.: 149 mg/dL (12 Jul 2020 10:59)  POCT Blood Glucose.: 96 mg/dL (12 Jul 2020 07:39)      RADIOLOGY & ADDITIONAL TESTS:    Imaging Personally Reviewed:  [X] YES  [ ] NO    Consultant(s) Notes Reviewed:  [X ] YES  [ ] NO    Care Discussed with Consultants/Other Providers [ X] YES  [ ] NO

## 2020-07-13 LAB
A1C WITH ESTIMATED AVERAGE GLUCOSE RESULT: 6.7 % — HIGH (ref 4–5.6)
ALBUMIN SERPL ELPH-MCNC: 3 G/DL — LOW (ref 3.3–5)
ALP SERPL-CCNC: 134 U/L — HIGH (ref 40–120)
ALT FLD-CCNC: 28 U/L — SIGNIFICANT CHANGE UP (ref 12–78)
ANION GAP SERPL CALC-SCNC: 5 MMOL/L — SIGNIFICANT CHANGE UP (ref 5–17)
AST SERPL-CCNC: 22 U/L — SIGNIFICANT CHANGE UP (ref 15–37)
BILIRUB SERPL-MCNC: 0.6 MG/DL — SIGNIFICANT CHANGE UP (ref 0.2–1.2)
BUN SERPL-MCNC: 8 MG/DL — SIGNIFICANT CHANGE UP (ref 7–23)
CALCIUM SERPL-MCNC: 8.9 MG/DL — SIGNIFICANT CHANGE UP (ref 8.5–10.1)
CHLORIDE SERPL-SCNC: 106 MMOL/L — SIGNIFICANT CHANGE UP (ref 96–108)
CO2 SERPL-SCNC: 29 MMOL/L — SIGNIFICANT CHANGE UP (ref 22–31)
CREAT SERPL-MCNC: 0.72 MG/DL — SIGNIFICANT CHANGE UP (ref 0.5–1.3)
ESTIMATED AVERAGE GLUCOSE: 146 MG/DL — HIGH (ref 68–114)
GLUCOSE BLDC GLUCOMTR-MCNC: 109 MG/DL — HIGH (ref 70–99)
GLUCOSE BLDC GLUCOMTR-MCNC: 120 MG/DL — HIGH (ref 70–99)
GLUCOSE BLDC GLUCOMTR-MCNC: 132 MG/DL — HIGH (ref 70–99)
GLUCOSE BLDC GLUCOMTR-MCNC: 147 MG/DL — HIGH (ref 70–99)
GLUCOSE SERPL-MCNC: 83 MG/DL — SIGNIFICANT CHANGE UP (ref 70–99)
HCT VFR BLD CALC: 36 % — SIGNIFICANT CHANGE UP (ref 34.5–45)
HGB BLD-MCNC: 11.7 G/DL — SIGNIFICANT CHANGE UP (ref 11.5–15.5)
MCHC RBC-ENTMCNC: 30.2 PG — SIGNIFICANT CHANGE UP (ref 27–34)
MCHC RBC-ENTMCNC: 32.5 GM/DL — SIGNIFICANT CHANGE UP (ref 32–36)
MCV RBC AUTO: 92.8 FL — SIGNIFICANT CHANGE UP (ref 80–100)
NRBC # BLD: 0 /100 WBCS — SIGNIFICANT CHANGE UP (ref 0–0)
PLATELET # BLD AUTO: 456 K/UL — HIGH (ref 150–400)
POTASSIUM SERPL-MCNC: 3.7 MMOL/L — SIGNIFICANT CHANGE UP (ref 3.5–5.3)
POTASSIUM SERPL-SCNC: 3.7 MMOL/L — SIGNIFICANT CHANGE UP (ref 3.5–5.3)
PROT SERPL-MCNC: 7.5 GM/DL — SIGNIFICANT CHANGE UP (ref 6–8.3)
RBC # BLD: 3.88 M/UL — SIGNIFICANT CHANGE UP (ref 3.8–5.2)
RBC # FLD: 13.6 % — SIGNIFICANT CHANGE UP (ref 10.3–14.5)
SODIUM SERPL-SCNC: 140 MMOL/L — SIGNIFICANT CHANGE UP (ref 135–145)
WBC # BLD: 10.08 K/UL — SIGNIFICANT CHANGE UP (ref 3.8–10.5)
WBC # FLD AUTO: 10.08 K/UL — SIGNIFICANT CHANGE UP (ref 3.8–10.5)

## 2020-07-13 PROCEDURE — 99233 SBSQ HOSP IP/OBS HIGH 50: CPT

## 2020-07-13 RX ORDER — LOPERAMIDE HCL 2 MG
2 TABLET ORAL
Refills: 0 | Status: DISCONTINUED | OUTPATIENT
Start: 2020-07-13 | End: 2020-07-14

## 2020-07-13 RX ADMIN — SERTRALINE 50 MILLIGRAM(S): 25 TABLET, FILM COATED ORAL at 11:25

## 2020-07-13 RX ADMIN — Medication 2 MILLIGRAM(S): at 18:09

## 2020-07-13 NOTE — PROGRESS NOTE ADULT - REASON FOR ADMISSION
Nausea, vomiting, likely gastroenteritis

## 2020-07-13 NOTE — PROGRESS NOTE ADULT - SUBJECTIVE AND OBJECTIVE BOX
HPI:  50 year old female PMH intercostal neuralgia, obesity, HTN, p/w vomiting for past 24 hours. Notes she is having a flare of her intercostal neuralgia but as a result of covid concerns she hasn't been able to come to the hospital. She has been being treated by pmd in the clinic but that hasn't been enough over the past month. She notes severe pain in the right flank area up to the shoulder. When the flares get bad she develops nausea and vomiting. she also notes having loose stools at the moment. She notes she also has a diffuse HA that is typical for her.  Admitted for gastroenteritis and pain control. (10 Jul 2020 15:32)    Patient is a 50y old  Female who presents with a chief complaint of Nausea, vomiting, likely gastroenteritis (12 Jul 2020 23:50)      INTERVAL HPI/OVERNIGHT EVENTS: no acute events overnight complains of diarrhea     MEDICATIONS  (STANDING):  ARIPiprazole 15 milliGRAM(s) Oral daily  aspirin enteric coated 81 milliGRAM(s) Oral daily  enalapril 10 milliGRAM(s) Oral daily  heparin   Injectable 5000 Unit(s) SubCutaneous every 12 hours  insulin glargine Injectable (LANTUS) 25 Unit(s) SubCutaneous at bedtime  levoFLOXacin IVPB      levoFLOXacin IVPB 500 milliGRAM(s) IV Intermittent every 24 hours  lidocaine   Patch 1 Patch Transdermal daily  loperamide 2 milliGRAM(s) Oral two times a day  metroNIDAZOLE  IVPB      metroNIDAZOLE  IVPB 500 milliGRAM(s) IV Intermittent every 8 hours  pantoprazole  Injectable 40 milliGRAM(s) IV Push every 12 hours  pregabalin 150 milliGRAM(s) Oral two times a day  sertraline 50 milliGRAM(s) Oral daily  sodium chloride 0.45% with potassium chloride 20 mEq/L 1000 milliLiter(s) (100 mL/Hr) IV Continuous <Continuous>    MEDICATIONS  (PRN):  morphine  - Injectable 2 milliGRAM(s) IV Push every 4 hours PRN Mild Pain (1 - 3)  ondansetron Injectable 4 milliGRAM(s) IV Push every 8 hours PRN Nausea and/or Vomiting  simethicone 80 milliGRAM(s) Chew three times a day PRN Gas      Allergies    amitriptyline (Other)    Intolerances        REVIEW OF SYSTEMS:  CONSTITUTIONAL: No fever, weight loss, or fatigue  EYES: No eye pain, visual disturbances, or discharge  ENMT:  No difficulty hearing, tinnitus, vertigo; No sinus or throat pain  NECK: No pain or stiffness  BREASTS: No pain, masses, or nipple discharge  RESPIRATORY: No cough, wheezing, chills or hemoptysis; No shortness of breath  CARDIOVASCULAR: No chest pain, palpitations, dizziness, or leg swelling  GASTROINTESTINAL: diarrhea   GENITOURINARY: No dysuria, frequency, hematuria, or incontinence  NEUROLOGICAL: No headaches, memory loss, loss of strength, numbness, or tremors  SKIN: No itching, burning, rashes, or lesions   LYMPH NODES: No enlarged glands  ENDOCRINE: No heat or cold intolerance; No hair loss  MUSCULOSKELETAL: No joint pain or swelling; No muscle, back, or extremity pain  PSYCHIATRIC: No depression, anxiety, mood swings, or difficulty sleeping  HEME/LYMPH: No easy bruising, or bleeding gums  ALLERGY AND IMMUNOLOGIC: No hives or eczema    Vital Signs Last 24 Hrs  T(C): 36.6 (13 Jul 2020 10:56), Max: 36.9 (13 Jul 2020 00:26)  T(F): 97.8 (13 Jul 2020 10:56), Max: 98.5 (13 Jul 2020 00:26)  HR: 54 (13 Jul 2020 10:56) (54 - 60)  BP: 163/73 (13 Jul 2020 10:56) (145/64 - 163/73)  BP(mean): --  RR: 16 (13 Jul 2020 10:56) (16 - 18)  SpO2: 98% (13 Jul 2020 10:56) (96% - 98%)    PHYSICAL EXAM:  GENERAL: NAD, well-groomed, well-developed  HEAD:  Atraumatic, Normocephalic  EYES: EOMI, PERRLA, conjunctiva and sclera clear  ENMT: No tonsillar erythema, exudates, or enlargement; Moist mucous membranes, Good dentition, No lesions  NECK: Supple, No JVD, Normal thyroid  NERVOUS SYSTEM:  Alert & Oriented X3, Good concentration; Motor Strength 5/5 B/L upper and lower extremities; DTRs 2+ intact and symmetric  CHEST/LUNG: Clear to percussion bilaterally; No rales, rhonchi, wheezing, or rubs  HEART: Regular rate and rhythm; No murmurs, rubs, or gallops  ABDOMEN: Soft, Nontender, Nondistended; Bowel sounds present  EXTREMITIES:  2+ Peripheral Pulses, No clubbing, cyanosis, or edema  LYMPH: No lymphadenopathy noted  SKIN: No rashes or lesions    LABS:                        11.7   10.08 )-----------( 456      ( 13 Jul 2020 07:06 )             36.0     07-13    140  |  106  |  8   ----------------------------<  83  3.7   |  29  |  0.72    Ca    8.9      13 Jul 2020 07:06  Phos  2.9     07-12  Mg     2.2     07-12    TPro  7.5  /  Alb  3.0<L>  /  TBili  0.6  /  DBili  x   /  AST  22  /  ALT  28  /  AlkPhos  134<H>  07-13        CAPILLARY BLOOD GLUCOSE      POCT Blood Glucose.: 132 mg/dL (13 Jul 2020 11:16)  POCT Blood Glucose.: 109 mg/dL (13 Jul 2020 08:03)  POCT Blood Glucose.: 166 mg/dL (12 Jul 2020 21:29)  POCT Blood Glucose.: 156 mg/dL (12 Jul 2020 16:35)      RADIOLOGY & ADDITIONAL TESTS:    Imaging Personally Reviewed:  [X ] YES  [ ] NO    Consultant(s) Notes Reviewed:  [ X] YES  [ ] NO    Care Discussed with Consultants/Other Providers [ X] YES  [ ] NO

## 2020-07-13 NOTE — DIETITIAN INITIAL EVALUATION ADULT. - OTHER INFO
Pt seen on medical floor ; w/ pmhx intercoastal neuralgia; obesity ; HTN ; DM2. Pt p/w vomiting x 24 hrs PTA (subsided). CT confirms gastroenteritis, COVID ruled out. Pt lives alone and receives food from [employer] we deliver through her insurance company. She is also eligible to Food as Health Eligible: [facility]. Pt has been dieting since 2006 when she weighed 495 #. She avoids red meat, fried foods, bakes and broils foods. Does not eat white flour, rice or pasta. She reports her HgbA1c was 10.5% back in November and recently 2 weeks ago 7.4 %. She is also exercising now. Pt admitted w/ Chronic pain disorder ; Gastroenteritis. Provided w/ written literature on weight loss tips & diabetes plate method for meal planning along w/ gastritis nutrition therapy .

## 2020-07-13 NOTE — DIETITIAN INITIAL EVALUATION ADULT. - PERTINENT MEDS FT
MEDICATIONS  (STANDING):  ARIPiprazole 15 milliGRAM(s) Oral daily  aspirin enteric coated 81 milliGRAM(s) Oral daily  enalapril 10 milliGRAM(s) Oral daily  heparin   Injectable 5000 Unit(s) SubCutaneous every 12 hours  insulin glargine Injectable (LANTUS) 25 Unit(s) SubCutaneous at bedtime  levoFLOXacin IVPB      levoFLOXacin IVPB 500 milliGRAM(s) IV Intermittent every 24 hours  lidocaine   Patch 1 Patch Transdermal daily  metroNIDAZOLE  IVPB      metroNIDAZOLE  IVPB 500 milliGRAM(s) IV Intermittent every 8 hours  pantoprazole  Injectable 40 milliGRAM(s) IV Push every 12 hours  pregabalin 150 milliGRAM(s) Oral two times a day  sertraline 50 milliGRAM(s) Oral daily  sodium chloride 0.45% with potassium chloride 20 mEq/L 1000 milliLiter(s) (100 mL/Hr) IV Continuous <Continuous>    MEDICATIONS  (PRN):  morphine  - Injectable 2 milliGRAM(s) IV Push every 4 hours PRN Mild Pain (1 - 3)  ondansetron Injectable 4 milliGRAM(s) IV Push every 8 hours PRN Nausea and/or Vomiting  simethicone 80 milliGRAM(s) Chew three times a day PRN Gas

## 2020-07-13 NOTE — DIETITIAN INITIAL EVALUATION ADULT. - PERTINENT LABORATORY DATA
07-13 Na140 mmol/L Glu 83 mg/dL K+ 3.7 mmol/L Cr  0.72 mg/dL BUN 8 mg/dL 07-12 Phos 2.9 mg/dL 07-13 Alb 3.0 g/dL<L>07-13 ALT 28 U/L AST 22 U/L Alkaline Phosphatase 134 U/L<H>

## 2020-07-13 NOTE — CHART NOTE - NSCHARTNOTEFT_GEN_A_CORE
Upon Nutritional Assessment by the Registered Dietitian your patient was determined to meet criteria / has evidence of the following diagnosis/diagnoses:          [ ]  Mild Protein Calorie Malnutrition        [ ]  Moderate Protein Calorie Malnutrition        [ ] Severe Protein Calorie Malnutrition        [ ] Unspecified Protein Calorie Malnutrition        [ ] Underweight / BMI <19        [x ] Morbid Obesity / BMI > 40      Findings as based on:  •  Comprehensive nutrition assessment and consultation  •  Calorie counts (nutrient intake analysis)  •  Food acceptance and intake status from observations by staff  •  Follow up  •  Patient education  •  Intervention secondary to interdisciplinary rounds  •   concerns      Treatment:    The following diet has been recommended: [facility] no snacks    Food as Health Eligible: [facility]       PROVIDER Section:     By signing this assessment you are acknowledging and agree with the diagnosis/diagnoses assigned by the Registered Dietitian    Comments:

## 2020-07-13 NOTE — PROGRESS NOTE ADULT - ASSESSMENT
50 year old female PMH intercostal neuralgia, obesity, HTN, p/w vomiting for past 24 hours. Notes she is having a flare of her intercostal neuralgia but as a result of covid concerns she hasn't been able to come to the hospital. She has been being treated by pmd in the clinic but that hasn't been enough over the past month. She notes severe pain in the right flank area up to the shoulder. When the flares get bad she develops nausea and vomiting. she also notes having loose stools at the moment. She notes she also has a diffuse HA that is typical for her.  Admitted for gastroenteritis and pain control.     Neuro: Continue Lyrica 150 mg/day, MSO4 prn IVP and lidocaine patch. Continue Ambilify 15 mg/day at home dose.  continue Zoloft 50     GI: CT confirms gastroenteritis. on levaquin and flagyl IVF and IV protonix.  Advance diet as tolerated.   Hypokalemia: replaced continue to monitor     Endo: Hold all oral agents, Lantus 25 units at night.  a1c 6.5    -Morbid obesity appreciate dietician

## 2020-07-14 ENCOUNTER — TRANSCRIPTION ENCOUNTER (OUTPATIENT)
Age: 50
End: 2020-07-14

## 2020-07-14 VITALS
OXYGEN SATURATION: 100 % | TEMPERATURE: 98 F | HEART RATE: 61 BPM | DIASTOLIC BLOOD PRESSURE: 78 MMHG | SYSTOLIC BLOOD PRESSURE: 155 MMHG | RESPIRATION RATE: 18 BRPM

## 2020-07-14 LAB
GLUCOSE BLDC GLUCOMTR-MCNC: 120 MG/DL — HIGH (ref 70–99)
GLUCOSE BLDC GLUCOMTR-MCNC: 244 MG/DL — HIGH (ref 70–99)

## 2020-07-14 PROCEDURE — 99239 HOSP IP/OBS DSCHRG MGMT >30: CPT

## 2020-07-14 RX ORDER — PANTOPRAZOLE SODIUM 20 MG/1
1 TABLET, DELAYED RELEASE ORAL
Qty: 30 | Refills: 0
Start: 2020-07-14 | End: 2020-08-12

## 2020-07-14 RX ORDER — SERTRALINE 25 MG/1
1 TABLET, FILM COATED ORAL
Qty: 0 | Refills: 0 | DISCHARGE

## 2020-07-14 RX ORDER — SERTRALINE 25 MG/1
1 TABLET, FILM COATED ORAL
Qty: 30 | Refills: 0
Start: 2020-07-14 | End: 2020-08-12

## 2020-07-14 RX ORDER — LIDOCAINE 4 G/100G
2 CREAM TOPICAL
Qty: 0 | Refills: 0 | DISCHARGE
Start: 2020-07-14 | End: 2020-08-12

## 2020-07-14 RX ORDER — LOPERAMIDE HCL 2 MG
1 TABLET ORAL
Qty: 20 | Refills: 0
Start: 2020-07-14 | End: 2020-07-23

## 2020-07-14 RX ORDER — SIMETHICONE 80 MG/1
1 TABLET, CHEWABLE ORAL
Qty: 90 | Refills: 0
Start: 2020-07-14 | End: 2020-08-12

## 2020-07-14 RX ORDER — INSULIN GLARGINE 100 [IU]/ML
0 INJECTION, SOLUTION SUBCUTANEOUS
Qty: 0 | Refills: 0 | DISCHARGE
Start: 2020-07-14

## 2020-07-14 RX ORDER — LIDOCAINE 4 G/100G
1 CREAM TOPICAL
Qty: 1 | Refills: 0
Start: 2020-07-14 | End: 2020-08-12

## 2020-07-14 RX ORDER — PANTOPRAZOLE SODIUM 20 MG/1
1 TABLET, DELAYED RELEASE ORAL
Qty: 0 | Refills: 0 | DISCHARGE
Start: 2020-07-14 | End: 2020-08-12

## 2020-07-14 RX ADMIN — Medication 2 MILLIGRAM(S): at 06:20

## 2020-07-14 RX ADMIN — SERTRALINE 50 MILLIGRAM(S): 25 TABLET, FILM COATED ORAL at 11:29

## 2020-07-14 NOTE — DISCHARGE NOTE PROVIDER - HOSPITAL COURSE
HPI:    50 year old female PMH intercostal neuralgia, obesity, HTN, p/w vomiting for past 24 hours. Notes she is having a flare of her intercostal neuralgia but as a result of covid concerns she hasn't been able to come to the hospital. She has been being treated by pmd in the clinic but that hasn't been enough over the past month. She notes severe pain in the right flank area up to the shoulder. When the flares get bad she develops nausea and vomiting. she also notes having loose stools at the moment. She notes she also has a diffuse HA that is typical for her.  Admitted for gastroenteritis and pain control. (10 Jul 2020 15:32)    < from: CT Abdomen and Pelvis w/ IV Cont (07.10.20 @ 13:23) >        IMPRESSION:     Fluid-filled nondistended large and small bowel. Consider gastroenteritis.        Trace ascites in a fat-containing umbilical hernia. No other ascites.        IUD in situ.        Hepatomegaly. Common duct distention and mild left  hepatic bile duct distention likely related to prior cholecystectomy. If biliary obstruction is of clinical concern, recommend MRI/MRCP.            < end of copied text >        Patient observed in hospitalist     given 4 day course of levaquin and flagyl     resolution of WBC     Improvement of Diarrhea and nausea but not completely resolved         will give patient a 7 day supply of her chronic pain medication         Patient will follow up with PCP     contact information given for GI for follow up

## 2020-07-14 NOTE — DISCHARGE NOTE PROVIDER - CARE PROVIDER_API CALL
PCP, Your  Phone: (   )    -  Fax: (   )    -  Follow Up Time:     [place] [address]  Phone: [phone]  Fax: [phone]  Follow Up Time:

## 2020-07-14 NOTE — DISCHARGE NOTE PROVIDER - PROVIDER TOKENS
FREE:[LAST:[PCP],FIRST:[Your],PHONE:[(   )    -],FAX:[(   )    -]],PROVIDER:[TOKEN:[[id number]]]

## 2020-07-14 NOTE — DISCHARGE NOTE PROVIDER - NSDCMRMEDTOKEN_GEN_ALL_CORE_FT
enalapril 10 mg oral tablet: 1 tab(s) orally once a day  insulin glargine: continue home dose   lidocaine 5% topical film: Apply topically to affected area once a day   loperamide 2 mg oral capsule: 1 cap(s) orally 2 times a day  oxycodone-acetaminophen 5 mg-325 mg oral tablet: 2 tab(s) orally every 6 hours MDD:eight tablets  pantoprazole 40 mg oral delayed release tablet: 1 tab(s) orally once a day   pregabalin 150 mg oral capsule: 1 cap(s) orally 2 times a day MDD:2 caps  simethicone 80 mg oral tablet, chewable: 1 tab(s) orally 3 times a day, As needed, Gas  Zoloft 50 mg oral tablet: 1 tab(s) orally once a day

## 2020-07-14 NOTE — DISCHARGE NOTE NURSING/CASE MANAGEMENT/SOCIAL WORK - PATIENT PORTAL LINK FT
You can access the FollowMyHealth Patient Portal offered by [facility] by registering at the following website: [email or web address]. By joining [employer]’s FollowMyHealth portal, you will also be able to view your health information using other applications (apps) compatible with our system.

## 2020-07-14 NOTE — DISCHARGE NOTE PROVIDER - NSDCCPCAREPLAN_GEN_ALL_CORE_FT
PRINCIPAL DISCHARGE DIAGNOSIS  Diagnosis: Gastroenteritis  Assessment and Plan of Treatment: You were given antibiotics and medicine to slow your diarrhea please follow up with your PCP and GI doctror      SECONDARY DISCHARGE DIAGNOSES  Diagnosis: Chronic generalized pain  Assessment and Plan of Treatment: please see your pain mangement doctor    Diagnosis: Gastroenteritis  Assessment and Plan of Treatment:

## 2020-07-17 DIAGNOSIS — R11.2 NAUSEA WITH VOMITING, UNSPECIFIED: ICD-10-CM

## 2020-07-17 DIAGNOSIS — E87.6 HYPOKALEMIA: ICD-10-CM

## 2020-07-17 DIAGNOSIS — K52.9 NONINFECTIVE GASTROENTERITIS AND COLITIS, UNSPECIFIED: ICD-10-CM

## 2020-07-17 DIAGNOSIS — E66.01 MORBID (SEVERE) OBESITY DUE TO EXCESS CALORIES: ICD-10-CM

## 2020-07-17 DIAGNOSIS — Z20.828 CONTACT WITH AND (SUSPECTED) EXPOSURE TO OTHER VIRAL COMMUNICABLE DISEASES: ICD-10-CM

## 2020-07-17 DIAGNOSIS — Z79.82 LONG TERM (CURRENT) USE OF ASPIRIN: ICD-10-CM

## 2020-07-17 DIAGNOSIS — M25.519 PAIN IN UNSPECIFIED SHOULDER: ICD-10-CM

## 2020-07-17 DIAGNOSIS — I10 ESSENTIAL (PRIMARY) HYPERTENSION: ICD-10-CM

## 2020-07-17 DIAGNOSIS — G89.4 CHRONIC PAIN SYNDROME: ICD-10-CM

## 2020-07-17 DIAGNOSIS — G58.0 INTERCOSTAL NEUROPATHY: ICD-10-CM

## 2020-09-17 ENCOUNTER — EMERGENCY (EMERGENCY)
Facility: HOSPITAL | Age: 50
LOS: 1 days | Discharge: HOME | End: 2020-09-17
Attending: EMERGENCY MEDICINE | Admitting: EMERGENCY MEDICINE
Payer: MEDICARE

## 2020-09-17 VITALS
DIASTOLIC BLOOD PRESSURE: 63 MMHG | TEMPERATURE: 98 F | OXYGEN SATURATION: 99 % | SYSTOLIC BLOOD PRESSURE: 140 MMHG | RESPIRATION RATE: 16 BRPM | HEART RATE: 66 BPM

## 2020-09-17 DIAGNOSIS — Z98.890 OTHER SPECIFIED POSTPROCEDURAL STATES: Chronic | ICD-10-CM

## 2020-09-17 DIAGNOSIS — Z90.49 ACQUIRED ABSENCE OF OTHER SPECIFIED PARTS OF DIGESTIVE TRACT: Chronic | ICD-10-CM

## 2020-09-17 LAB
ALBUMIN SERPL ELPH-MCNC: 3.7 G/DL — SIGNIFICANT CHANGE UP (ref 3.3–5)
ALP SERPL-CCNC: 149 U/L — HIGH (ref 40–120)
ALT FLD-CCNC: 22 U/L — SIGNIFICANT CHANGE UP (ref 4–33)
ANION GAP SERPL CALC-SCNC: 12 MMO/L — SIGNIFICANT CHANGE UP (ref 7–14)
APPEARANCE UR: CLEAR — SIGNIFICANT CHANGE UP
AST SERPL-CCNC: 24 U/L — SIGNIFICANT CHANGE UP (ref 4–32)
B-OH-BUTYR SERPL-SCNC: 0.1 MMOL/L — SIGNIFICANT CHANGE UP (ref 0–0.4)
BACTERIA # UR AUTO: NEGATIVE — SIGNIFICANT CHANGE UP
BASE EXCESS BLDV CALC-SCNC: -0.7 MMOL/L — SIGNIFICANT CHANGE UP
BASOPHILS # BLD AUTO: 0.02 K/UL — SIGNIFICANT CHANGE UP (ref 0–0.2)
BASOPHILS NFR BLD AUTO: 0.1 % — SIGNIFICANT CHANGE UP (ref 0–2)
BILIRUB SERPL-MCNC: 0.4 MG/DL — SIGNIFICANT CHANGE UP (ref 0.2–1.2)
BILIRUB UR-MCNC: NEGATIVE — SIGNIFICANT CHANGE UP
BLOOD GAS VENOUS - CREATININE: 0.67 MG/DL — SIGNIFICANT CHANGE UP (ref 0.5–1.3)
BLOOD GAS VENOUS - FIO2: 21 — SIGNIFICANT CHANGE UP
BLOOD UR QL VISUAL: SIGNIFICANT CHANGE UP
BUN SERPL-MCNC: 11 MG/DL — SIGNIFICANT CHANGE UP (ref 7–23)
CALCIUM SERPL-MCNC: 8.5 MG/DL — SIGNIFICANT CHANGE UP (ref 8.4–10.5)
CHLORIDE BLDV-SCNC: 114 MMOL/L — HIGH (ref 96–108)
CHLORIDE SERPL-SCNC: 106 MMOL/L — SIGNIFICANT CHANGE UP (ref 98–107)
CO2 SERPL-SCNC: 21 MMOL/L — LOW (ref 22–31)
COLOR SPEC: YELLOW — SIGNIFICANT CHANGE UP
CREAT SERPL-MCNC: 0.66 MG/DL — SIGNIFICANT CHANGE UP (ref 0.5–1.3)
EOSINOPHIL # BLD AUTO: 0.22 K/UL — SIGNIFICANT CHANGE UP (ref 0–0.5)
EOSINOPHIL NFR BLD AUTO: 1.2 % — SIGNIFICANT CHANGE UP (ref 0–6)
GAS PNL BLDV: 134 MMOL/L — LOW (ref 136–146)
GLUCOSE BLDV-MCNC: 184 MG/DL — HIGH (ref 70–99)
GLUCOSE SERPL-MCNC: 200 MG/DL — HIGH (ref 70–99)
GLUCOSE UR-MCNC: NEGATIVE — SIGNIFICANT CHANGE UP
HCG SERPL-ACNC: < 5 MIU/ML — SIGNIFICANT CHANGE UP
HCO3 BLDV-SCNC: 23 MMOL/L — SIGNIFICANT CHANGE UP (ref 20–27)
HCT VFR BLD CALC: 39.7 % — SIGNIFICANT CHANGE UP (ref 34.5–45)
HCT VFR BLDV CALC: 40.7 % — SIGNIFICANT CHANGE UP (ref 34.5–45)
HGB BLD-MCNC: 12.9 G/DL — SIGNIFICANT CHANGE UP (ref 11.5–15.5)
HGB BLDV-MCNC: 13.3 G/DL — SIGNIFICANT CHANGE UP (ref 11.5–15.5)
HYALINE CASTS # UR AUTO: NEGATIVE — SIGNIFICANT CHANGE UP
IMM GRANULOCYTES NFR BLD AUTO: 0.5 % — SIGNIFICANT CHANGE UP (ref 0–1.5)
KETONES UR-MCNC: NEGATIVE — SIGNIFICANT CHANGE UP
LACTATE BLDV-MCNC: 2.2 MMOL/L — HIGH (ref 0.5–2)
LEUKOCYTE ESTERASE UR-ACNC: NEGATIVE — SIGNIFICANT CHANGE UP
LIDOCAIN IGE QN: 32.1 U/L — SIGNIFICANT CHANGE UP (ref 7–60)
LYMPHOCYTES # BLD AUTO: 11.6 % — LOW (ref 13–44)
LYMPHOCYTES # BLD AUTO: 2.16 K/UL — SIGNIFICANT CHANGE UP (ref 1–3.3)
MCHC RBC-ENTMCNC: 30 PG — SIGNIFICANT CHANGE UP (ref 27–34)
MCHC RBC-ENTMCNC: 32.5 % — SIGNIFICANT CHANGE UP (ref 32–36)
MCV RBC AUTO: 92.3 FL — SIGNIFICANT CHANGE UP (ref 80–100)
MONOCYTES # BLD AUTO: 0.82 K/UL — SIGNIFICANT CHANGE UP (ref 0–0.9)
MONOCYTES NFR BLD AUTO: 4.4 % — SIGNIFICANT CHANGE UP (ref 2–14)
NEUTROPHILS # BLD AUTO: 15.3 K/UL — HIGH (ref 1.8–7.4)
NEUTROPHILS NFR BLD AUTO: 82.2 % — HIGH (ref 43–77)
NITRITE UR-MCNC: NEGATIVE — SIGNIFICANT CHANGE UP
NRBC # FLD: 0 K/UL — SIGNIFICANT CHANGE UP (ref 0–0)
PCO2 BLDV: 45 MMHG — SIGNIFICANT CHANGE UP (ref 41–51)
PH BLDV: 7.35 PH — SIGNIFICANT CHANGE UP (ref 7.32–7.43)
PH UR: 6 — SIGNIFICANT CHANGE UP (ref 5–8)
PLATELET # BLD AUTO: 451 K/UL — HIGH (ref 150–400)
PMV BLD: 9.4 FL — SIGNIFICANT CHANGE UP (ref 7–13)
PO2 BLDV: 33 MMHG — LOW (ref 35–40)
POTASSIUM BLDV-SCNC: 3.6 MMOL/L — SIGNIFICANT CHANGE UP (ref 3.4–4.5)
POTASSIUM SERPL-MCNC: 4.2 MMOL/L — SIGNIFICANT CHANGE UP (ref 3.5–5.3)
POTASSIUM SERPL-SCNC: 4.2 MMOL/L — SIGNIFICANT CHANGE UP (ref 3.5–5.3)
PROT SERPL-MCNC: 7.5 G/DL — SIGNIFICANT CHANGE UP (ref 6–8.3)
PROT UR-MCNC: NEGATIVE — SIGNIFICANT CHANGE UP
RBC # BLD: 4.3 M/UL — SIGNIFICANT CHANGE UP (ref 3.8–5.2)
RBC # FLD: 13.3 % — SIGNIFICANT CHANGE UP (ref 10.3–14.5)
RBC CASTS # UR COMP ASSIST: HIGH (ref 0–?)
SAO2 % BLDV: 56 % — LOW (ref 60–85)
SODIUM SERPL-SCNC: 139 MMOL/L — SIGNIFICANT CHANGE UP (ref 135–145)
SP GR SPEC: 1.02 — SIGNIFICANT CHANGE UP (ref 1–1.04)
SQUAMOUS # UR AUTO: SIGNIFICANT CHANGE UP
UROBILINOGEN FLD QL: NORMAL — SIGNIFICANT CHANGE UP
WBC # BLD: 18.61 K/UL — HIGH (ref 3.8–10.5)
WBC # FLD AUTO: 18.61 K/UL — HIGH (ref 3.8–10.5)
WBC UR QL: SIGNIFICANT CHANGE UP (ref 0–?)

## 2020-09-17 PROCEDURE — 74177 CT ABD & PELVIS W/CONTRAST: CPT | Mod: 26

## 2020-09-17 PROCEDURE — 99218: CPT

## 2020-09-17 RX ORDER — GLUCAGON INJECTION, SOLUTION 0.5 MG/.1ML
1 INJECTION, SOLUTION SUBCUTANEOUS ONCE
Refills: 0 | Status: DISCONTINUED | OUTPATIENT
Start: 2020-09-17 | End: 2020-09-21

## 2020-09-17 RX ORDER — CYCLOBENZAPRINE HYDROCHLORIDE 10 MG/1
10 TABLET, FILM COATED ORAL ONCE
Refills: 0 | Status: COMPLETED | OUTPATIENT
Start: 2020-09-17 | End: 2020-09-17

## 2020-09-17 RX ORDER — SERTRALINE 25 MG/1
50 TABLET, FILM COATED ORAL DAILY
Refills: 0 | Status: DISCONTINUED | OUTPATIENT
Start: 2020-09-18 | End: 2020-09-21

## 2020-09-17 RX ORDER — ONDANSETRON 8 MG/1
4 TABLET, FILM COATED ORAL EVERY 4 HOURS
Refills: 0 | Status: DISCONTINUED | OUTPATIENT
Start: 2020-09-17 | End: 2020-09-21

## 2020-09-17 RX ORDER — MORPHINE SULFATE 50 MG/1
4 CAPSULE, EXTENDED RELEASE ORAL ONCE
Refills: 0 | Status: DISCONTINUED | OUTPATIENT
Start: 2020-09-17 | End: 2020-09-17

## 2020-09-17 RX ORDER — ARIPIPRAZOLE 15 MG/1
15 TABLET ORAL DAILY
Refills: 0 | Status: DISCONTINUED | OUTPATIENT
Start: 2020-09-18 | End: 2020-09-21

## 2020-09-17 RX ORDER — DEXTROSE 50 % IN WATER 50 %
25 SYRINGE (ML) INTRAVENOUS ONCE
Refills: 0 | Status: DISCONTINUED | OUTPATIENT
Start: 2020-09-17 | End: 2020-09-21

## 2020-09-17 RX ORDER — ONDANSETRON 8 MG/1
4 TABLET, FILM COATED ORAL ONCE
Refills: 0 | Status: COMPLETED | OUTPATIENT
Start: 2020-09-17 | End: 2020-09-17

## 2020-09-17 RX ORDER — PANTOPRAZOLE SODIUM 20 MG/1
40 TABLET, DELAYED RELEASE ORAL DAILY
Refills: 0 | Status: DISCONTINUED | OUTPATIENT
Start: 2020-09-18 | End: 2020-09-21

## 2020-09-17 RX ORDER — SODIUM CHLORIDE 9 MG/ML
1000 INJECTION, SOLUTION INTRAVENOUS
Refills: 0 | Status: DISCONTINUED | OUTPATIENT
Start: 2020-09-17 | End: 2020-09-21

## 2020-09-17 RX ORDER — TRAZODONE HCL 50 MG
100 TABLET ORAL AT BEDTIME
Refills: 0 | Status: DISCONTINUED | OUTPATIENT
Start: 2020-09-18 | End: 2020-09-21

## 2020-09-17 RX ORDER — INSULIN GLARGINE 100 [IU]/ML
27 INJECTION, SOLUTION SUBCUTANEOUS ONCE
Refills: 0 | Status: COMPLETED | OUTPATIENT
Start: 2020-09-17 | End: 2020-09-17

## 2020-09-17 RX ORDER — METOCLOPRAMIDE HCL 10 MG
10 TABLET ORAL ONCE
Refills: 0 | Status: COMPLETED | OUTPATIENT
Start: 2020-09-17 | End: 2020-09-17

## 2020-09-17 RX ORDER — CYCLOBENZAPRINE HYDROCHLORIDE 10 MG/1
10 TABLET, FILM COATED ORAL
Refills: 0 | Status: DISCONTINUED | OUTPATIENT
Start: 2020-09-18 | End: 2020-09-21

## 2020-09-17 RX ORDER — FUROSEMIDE 40 MG
40 TABLET ORAL DAILY
Refills: 0 | Status: DISCONTINUED | OUTPATIENT
Start: 2020-09-18 | End: 2020-09-21

## 2020-09-17 RX ORDER — DEXTROSE 50 % IN WATER 50 %
12.5 SYRINGE (ML) INTRAVENOUS ONCE
Refills: 0 | Status: DISCONTINUED | OUTPATIENT
Start: 2020-09-17 | End: 2020-09-21

## 2020-09-17 RX ORDER — SODIUM CHLORIDE 9 MG/ML
1000 INJECTION INTRAMUSCULAR; INTRAVENOUS; SUBCUTANEOUS
Refills: 0 | Status: DISCONTINUED | OUTPATIENT
Start: 2020-09-17 | End: 2020-09-21

## 2020-09-17 RX ORDER — SIMETHICONE 80 MG/1
160 TABLET, CHEWABLE ORAL THREE TIMES A DAY
Refills: 0 | Status: DISCONTINUED | OUTPATIENT
Start: 2020-09-17 | End: 2020-09-21

## 2020-09-17 RX ORDER — TRAZODONE HCL 50 MG
100 TABLET ORAL ONCE
Refills: 0 | Status: COMPLETED | OUTPATIENT
Start: 2020-09-17 | End: 2020-09-17

## 2020-09-17 RX ORDER — FAMOTIDINE 10 MG/ML
20 INJECTION INTRAVENOUS ONCE
Refills: 0 | Status: COMPLETED | OUTPATIENT
Start: 2020-09-17 | End: 2020-09-17

## 2020-09-17 RX ORDER — DEXTROSE 50 % IN WATER 50 %
15 SYRINGE (ML) INTRAVENOUS ONCE
Refills: 0 | Status: DISCONTINUED | OUTPATIENT
Start: 2020-09-17 | End: 2020-09-21

## 2020-09-17 RX ORDER — SODIUM CHLORIDE 9 MG/ML
1000 INJECTION INTRAMUSCULAR; INTRAVENOUS; SUBCUTANEOUS ONCE
Refills: 0 | Status: COMPLETED | OUTPATIENT
Start: 2020-09-17 | End: 2020-09-17

## 2020-09-17 RX ORDER — LIDOCAINE 4 G/100G
1 CREAM TOPICAL EVERY 24 HOURS
Refills: 0 | Status: DISCONTINUED | OUTPATIENT
Start: 2020-09-17 | End: 2020-09-21

## 2020-09-17 RX ADMIN — SODIUM CHLORIDE 125 MILLILITER(S): 9 INJECTION INTRAMUSCULAR; INTRAVENOUS; SUBCUTANEOUS at 22:56

## 2020-09-17 RX ADMIN — ONDANSETRON 4 MILLIGRAM(S): 8 TABLET, FILM COATED ORAL at 17:35

## 2020-09-17 RX ADMIN — MORPHINE SULFATE 4 MILLIGRAM(S): 50 CAPSULE, EXTENDED RELEASE ORAL at 17:41

## 2020-09-17 RX ADMIN — Medication 10 MILLIGRAM(S): at 15:57

## 2020-09-17 RX ADMIN — SODIUM CHLORIDE 1000 MILLILITER(S): 9 INJECTION INTRAMUSCULAR; INTRAVENOUS; SUBCUTANEOUS at 15:57

## 2020-09-17 RX ADMIN — Medication 150 MILLIGRAM(S): at 21:29

## 2020-09-17 RX ADMIN — FAMOTIDINE 20 MILLIGRAM(S): 10 INJECTION INTRAVENOUS at 19:06

## 2020-09-17 RX ADMIN — Medication 100 MILLIGRAM(S): at 22:56

## 2020-09-17 RX ADMIN — CYCLOBENZAPRINE HYDROCHLORIDE 10 MILLIGRAM(S): 10 TABLET, FILM COATED ORAL at 22:54

## 2020-09-17 RX ADMIN — LIDOCAINE 1 PATCH: 4 CREAM TOPICAL at 22:53

## 2020-09-17 RX ADMIN — SODIUM CHLORIDE 1000 MILLILITER(S): 9 INJECTION INTRAMUSCULAR; INTRAVENOUS; SUBCUTANEOUS at 17:35

## 2020-09-17 RX ADMIN — MORPHINE SULFATE 4 MILLIGRAM(S): 50 CAPSULE, EXTENDED RELEASE ORAL at 16:02

## 2020-09-17 NOTE — ED CDU PROVIDER INITIAL DAY NOTE - PSH
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

## 2020-09-17 NOTE — ED PROVIDER NOTE - CARE PLAN
Principal Discharge DX:	Abdominal pain  Secondary Diagnosis:	Vomiting  Secondary Diagnosis:	Diarrhea

## 2020-09-17 NOTE — ED PROVIDER NOTE - OBJECTIVE STATEMENT
49 y/o F hx DM, HTN, neuropathy, on insulin and gabapentin, presents with sudden onset N/V diarrhea since 3 am. Both non bloody, billious No sick contacts, no one else in home who ate same food is symptomatic. Denies fevers. Had a similar episode in july for which she was reportedly admitted for hypokalemia. Currently feels ill, generalized abdominal pain most prominent suprapubic. States increased urinary pressure. No recent abx. Denies cp, sob, syncope.     PSH cholecystectomy 51 y/o F hx DM, HTN, neuropathy, on insulin and gabapentin, presents with sudden onset N/V diarrhea since 3 am. Both non bloody, billious No sick contacts, no one else in home who ate same food is symptomatic. Denies fevers. Had a similar episode in july for which she was reportedly admitted for hypokalemia. Currently feels ill, generalized abdominal pain most prominent suprapubic. States increased urinary pressure. No recent abx. Denies cp, sob, syncope.     PSH cholecystectomy    Attending/Winsome: 51 yo F h/o DM, HTN USOH until 0300 this morning when developed nonbloody n/v/d as well as suprapubic/periumbilical pain. She stated to eating fish for dinner however others who ate the same meal; are asymptomatic. Pt was admitted for similar symptoms in July. She further denies CP, SOB or palp.

## 2020-09-17 NOTE — ED CDU PROVIDER INITIAL DAY NOTE - OBJECTIVE STATEMENT
49 y/o F hx DM, HTN, neuropathy, on insulin and gabapentin, presents with sudden onset N/V diarrhea since 3 am. Both non bloody, billious No sick contacts, no one else in home who ate same food is symptomatic. Denies fevers. Had a similar episode in july for which she was reportedly admitted for hypokalemia. Currently feels ill, generalized abdominal pain most prominent suprapubic. States increased urinary pressure. No recent abx. Denies cp, sob, syncope.   PSH cholecystectomy  Attending/Winsome: 51 yo F h/o DM, HTN USOH until 0300 this morning when developed nonbloody n/v/d as well as suprapubic/periumbilical pain. She stated to eating fish for dinner however others who ate the same meal; are asymptomatic. Pt was admitted for similar symptoms in July. She further denies CP, SOB or palp.    CDU JACKSON Ho Note------  51 yo female, PMH DM, HTN, neuropathy, "intercostal neuralgia", depression, hx/o "cardiac event" in the past (pt cannot give further details; denies hx/o MI, hx/o former cigarette smoker (quit 7 years ago), and active marijuana smoker (states smokes 3 - 4 times per day for pain management reasons; pt presented to the ED for generalized abdominal pain, N/V/D since early this morning.  No hx/o fevers or recent travel.  Pt. was evaluated in the ED; WBC 18.61, serum glucose 200, anion gap 12, ; CMP otherwise unremarkable.  Lipase 32.1, beta hydroxy butyrate 0.1, VBG pH 7.35, lactate 2.2, BHCG <5, UA +small blood, 6-10 RBC; UA negative for ketones/leuk/nitrites.  CT abd/pelvis was performed: ".....IMPRESSION:  Hepatomegaly. Otherwise, unremarkable exam.".  Pt was offered a PO challenge in the ED which pt was unable to tolerate; pt was dispo'd to CDU for continued care plan:  IV hydration, antiemetics PRN, supportive care, am labs, general observation care / monitoring.  Goal for subjective/objective improvement and tolerance of PO challenge.  On CDU evaluation, pt denies active nausea or pain.  CDU care plan d/w pt who verbalizes agreement with plan.

## 2020-09-17 NOTE — ED CDU PROVIDER INITIAL DAY NOTE - PMH
Cardiac abnormality  (pt states hx/o "cardiac event"; is unclear on diagnosis; denies hx/o MI)  Diabetes    Former cigarette smoker  (smoked x 45 years; quit ~2013)  HTN (hypertension)    Marijuana smoker, continuous  (states smokes 3 - 4 times per day for pain management reasons)  Neuralgia  (pt states hx/o "intercostal neuralgia")  Neuropathy    Obesity

## 2020-09-17 NOTE — ED PROVIDER NOTE - CLINICAL SUMMARY MEDICAL DECISION MAKING FREE TEXT BOX
49 y/o F hx DM, HTN, neuropathy, on insulin and gabapentin, presents with sudden onset N/V diarrhea since 3 am. Both nonbloody. Had similar episode requiring admission back in july. Also describes bilateral LE paresthesias worse than baseline neuropathy. Exam suprapubic tenderness. Neurologically intact, able to resist gravity. Likely viral gastro vs ascending UTI. Unlikely surgical pathology. unlikely DKA but will check cbc, cmp, ekg, Beta hydroxy, VBG, urine, reassess.

## 2020-09-17 NOTE — ED PROVIDER NOTE - PHYSICAL EXAMINATION
Vitals: I have reviewed the patients vital signs  General: uncomfortable appearing, well nourished, moving in pain in bed  HEENT: atraumatic, normocephalic, airway patent, EOMI and appropriate tracking  Neck: no JVD, no tracheal deviation  Chest/Lungs: no trauma, symmetric chest rise, speaking in complete sentences, saturating well  Heart: Regular rate, regular rhythm, skin well perfused  Abdomen: Soft mild suprapubic tenderness, no rebound, no guarding, no pulsatile mass, no bruising  Neuro: A+Ox3, able to ambulate, non dysarthric speech, moving all 4 extremities against gravity.  MSK: all limbs at baseline strength, no wasting or atrophy, able to resist gravity  Skin: no bleeding, no cyanosis, no new emergent lesions Vitals: I have reviewed the patients vital signs  General: uncomfortable appearing, well nourished, moving in pain in bed  HEENT: atraumatic, normocephalic, airway patent, EOMI and appropriate tracking  Neck: no JVD, no tracheal deviation  Chest/Lungs: no trauma, symmetric chest rise, speaking in complete sentences, saturating well  Heart: Regular rate, regular rhythm, skin well perfused  Abdomen: Soft mild suprapubic tenderness, no rebound, no guarding, no pulsatile mass, no bruising  Neuro: A+Ox3, able to ambulate, non dysarthric speech, moving all 4 extremities against gravity.  MSK: all limbs at baseline strength, no wasting or atrophy, able to resist gravity  Skin: no bleeding, no cyanosis, no new emergent lesions    Attending/Winsome: Well-appearing, NAD; PERRL/EOMI, non-icterus, supple, no VALENTIN, no JVD, RRR, CTAB; Abd-soft, +suprapubic PT, no rebound or guarding,, no HSM; no LE edema, A&Ox3, nonfocal; Skin-warm/dry

## 2020-09-17 NOTE — ED ADULT NURSE NOTE - OBJECTIVE STATEMENT
Received PT in intake 3, PT AOx4, ambulatory at baseline c/o Received PT in intake 3, PT AOx4, ambulatory at baseline c/o waking up at 3am with nausea, vomiting, and diarrhea. Pt c/o abdomen pain 9/10. Pt abd soft and tender on assessment. pt denies chest pain, SOB, fever, chills. pt has old cuts on left wrist, pt states she has a hx of cutting but currently denies SI/HI. Pt medicated as per MD order. 20G placed in left AC, labs sent. Safety measures in place. will continue to monitor.

## 2020-09-17 NOTE — ED CDU PROVIDER INITIAL DAY NOTE - FAMILY HISTORY
How Severe Is Your Skin Lesion?: moderate Has Your Skin Lesion Been Treated?: not been treated Is This A New Presentation, Or A Follow-Up?: Skin Lesion No pertinent family history in first degree relatives

## 2020-09-17 NOTE — ED ADULT NURSE REASSESSMENT NOTE - NS ED NURSE REASSESS COMMENT FT1
Pt arrives from intake with abdominal pain. Pt started vomiting @ 3am this morning. Pt feeling nauseous. Pain 9/10. zofran given as ordered. MD made aware of pain. Orders to follow. WIll monitor.

## 2020-09-17 NOTE — ED ADULT TRIAGE NOTE - CHIEF COMPLAINT QUOTE
Patient brought to ER by EMS from home for c/o nausea, vomiting, and diarrhea since 3 a.m after she ate some fish last night. Pt c/o abdominal pain. : Patient is a type 2 diabetic.

## 2020-09-17 NOTE — ED CDU PROVIDER INITIAL DAY NOTE - PHYSICAL EXAMINATION
CONSTITUTIONAL:  Well appearing, awake, alert, oriented to person, place, time/situation and in no apparent distress.  Pt. is objectively comfortable appearing and verbalizing in full, clear, effortless sentences.  ENMT: NC/AT.  Airway patent.  Nasal mucosa clear.  Moist mucous membranes.  Neck supple.  EYES:  Clear OU.  CARDIAC:  Normal rate, regular rhythm.  Heart sounds S1 S2.  No murmurs, gallops, or rubs.  RESPIRATORY:  Breath sounds clear and equal bilaterally.  No wheezes, no rales, no rhonchi.  GASTROINTESTINAL:  +BS throughout.  Abdomen soft, non-distended, objectively non-tender.  No rebound, no guarding.  MUSCULOSKELETAL:  Range of motion is not limited.  .    SKIN:  Skin color unremarkable.  Skin warm, dry, and intact.    PSYCHIATRIC:  Alert and oriented to person/place/time/situation.  Mood and affect WNL.  No apparent risk to self or others. CONSTITUTIONAL:  Well appearing, awake, alert, oriented to person, place, time/situation and in no apparent distress.  Pt. is objectively comfortable appearing and verbalizing in full, clear, effortless sentences.  ENMT: NC/AT.  Airway patent.  Nasal mucosa clear.  Moist mucous membranes.  Neck supple.  EYES:  Clear OU.  CARDIAC:  Normal rate, regular rhythm.  Heart sounds S1 S2.  No murmurs, gallops, or rubs.  RESPIRATORY:  Breath sounds clear and equal bilaterally.  No wheezes, no rales, no rhonchi.  GASTROINTESTINAL:  +BS throughout.  Abdomen soft, non-distended, objectively non-tender.  No rebound, no guarding.  MUSCULOSKELETAL:  Range of motion is not limited.  .    SKIN:  Skin color unremarkable.  Skin warm, dry, and intact.    PSYCHIATRIC:  Alert and oriented to person/place/time/situation.  Mood and affect WNL.  No apparent risk to self or others.    Attending/Winsome: Well-appearing, NAD; PERRL/EOMI, non-icterus, supple, no VALENTIN, no JVD, RRR, CTAB; Abd-soft, +suprapubic PT, no rebound or guarding,, no HSM; no LE edema, A&Ox3, nonfocal; Skin-warm/dry

## 2020-09-17 NOTE — ED ADULT NURSE NOTE - NSIMPLEMENTINTERV_GEN_ALL_ED
Implemented All Universal Safety Interventions:  [place] to call system. Call bell, personal items and telephone within reach. Instruct patient to call for assistance. Room bathroom lighting operational. Non-slip footwear when patient is off stretcher. Physically safe environment: no spills, clutter or unnecessary equipment. Stretcher in lowest position, wheels locked, appropriate side rails in place.

## 2020-09-17 NOTE — ED CDU PROVIDER INITIAL DAY NOTE - PROGRESS NOTE DETAILS
JACKSON Tan, patient still c/o nausea and diarrhea, will continue to obs overnight for supportive care. JACKSON Tan, patient sleeping comfortably in NAD, will PO challenge and re-assess, see CDU subsequent day note for updates.

## 2020-09-17 NOTE — ED CDU PROVIDER INITIAL DAY NOTE - MEDICAL DECISION MAKING DETAILS
IV hydration, antiemetics PRN, supportive care, am labs, general observation care / monitoring.  Goal for subjective/objective improvement and tolerance of PO challenge.

## 2020-09-17 NOTE — SBIRT NOTE ADULT - NSSBIRTDRGBRIEFINTDET_GEN_A_CORE
Provided SBIRT services: Full screen positive. Brief Intervention Performed. Screening results were reviewed with the patient and patient was provided information about healthy guidelines and potential negative consequences associated with level of risk. Motivation and readiness to reduce or stop use was discussed and goals and activities to make changes were suggested/offered.  Provided pt with information on [facility] Marijuana [facility], providers, and how to obtain an evaluation.

## 2020-09-17 NOTE — ED PROVIDER NOTE - NS ED ROS FT
Constitutional: (-) fever (+) vomiting  Eyes/ENT: (-) vision changes, (-) hearing changes  Cardiovascular: (-) chest pain, (-) wheezing  Respiratory: (-) cough, (-) shortness of breath  Gastrointestinal: (+) vomiting, (+) diarrhea, (+) abdominal pain  : (-) dysuria   Musculoskeletal: (-) new back pain  Integumentary: (-) rash, (-) edema  Neurological: (-)loc  Allergic/Immunologic: (-) pruritus  Endocrine: No history of thyroid disease

## 2020-09-18 PROBLEM — Z00.00 ENCOUNTER FOR PREVENTIVE HEALTH EXAMINATION: Noted: 2020-09-18

## 2020-09-18 LAB
ALBUMIN SERPL ELPH-MCNC: 3.4 G/DL — SIGNIFICANT CHANGE UP (ref 3.3–5)
ALP SERPL-CCNC: 138 U/L — HIGH (ref 40–120)
ALT FLD-CCNC: 23 U/L — SIGNIFICANT CHANGE UP (ref 4–33)
ANION GAP SERPL CALC-SCNC: 9 MMO/L — SIGNIFICANT CHANGE UP (ref 7–14)
AST SERPL-CCNC: 23 U/L — SIGNIFICANT CHANGE UP (ref 4–32)
BASE EXCESS BLDV CALC-SCNC: -1.7 MMOL/L — SIGNIFICANT CHANGE UP
BASOPHILS # BLD AUTO: 0.02 K/UL — SIGNIFICANT CHANGE UP (ref 0–0.2)
BASOPHILS NFR BLD AUTO: 0.2 % — SIGNIFICANT CHANGE UP (ref 0–2)
BILIRUB SERPL-MCNC: 0.4 MG/DL — SIGNIFICANT CHANGE UP (ref 0.2–1.2)
BLOOD GAS VENOUS - CREATININE: 0.65 MG/DL — SIGNIFICANT CHANGE UP (ref 0.5–1.3)
BUN SERPL-MCNC: 8 MG/DL — SIGNIFICANT CHANGE UP (ref 7–23)
CALCIUM SERPL-MCNC: 8.2 MG/DL — LOW (ref 8.4–10.5)
CHLORIDE BLDV-SCNC: 112 MMOL/L — HIGH (ref 96–108)
CHLORIDE SERPL-SCNC: 106 MMOL/L — SIGNIFICANT CHANGE UP (ref 98–107)
CO2 SERPL-SCNC: 24 MMOL/L — SIGNIFICANT CHANGE UP (ref 22–31)
CREAT SERPL-MCNC: 0.62 MG/DL — SIGNIFICANT CHANGE UP (ref 0.5–1.3)
CULTURE RESULTS: SIGNIFICANT CHANGE UP
EOSINOPHIL # BLD AUTO: 0.28 K/UL — SIGNIFICANT CHANGE UP (ref 0–0.5)
EOSINOPHIL NFR BLD AUTO: 2.5 % — SIGNIFICANT CHANGE UP (ref 0–6)
GAS PNL BLDV: 137 MMOL/L — SIGNIFICANT CHANGE UP (ref 136–146)
GLUCOSE BLDV-MCNC: 85 MG/DL — SIGNIFICANT CHANGE UP (ref 70–99)
GLUCOSE SERPL-MCNC: 87 MG/DL — SIGNIFICANT CHANGE UP (ref 70–99)
HCO3 BLDV-SCNC: 23 MMOL/L — SIGNIFICANT CHANGE UP (ref 20–27)
HCT VFR BLD CALC: 37.3 % — SIGNIFICANT CHANGE UP (ref 34.5–45)
HCT VFR BLDV CALC: 37.1 % — SIGNIFICANT CHANGE UP (ref 34.5–45)
HGB BLD-MCNC: 11.7 G/DL — SIGNIFICANT CHANGE UP (ref 11.5–15.5)
HGB BLDV-MCNC: 12.1 G/DL — SIGNIFICANT CHANGE UP (ref 11.5–15.5)
IMM GRANULOCYTES NFR BLD AUTO: 0.4 % — SIGNIFICANT CHANGE UP (ref 0–1.5)
LACTATE BLDV-MCNC: 1.6 MMOL/L — SIGNIFICANT CHANGE UP (ref 0.5–2)
LYMPHOCYTES # BLD AUTO: 3.49 K/UL — HIGH (ref 1–3.3)
LYMPHOCYTES # BLD AUTO: 31.1 % — SIGNIFICANT CHANGE UP (ref 13–44)
MCHC RBC-ENTMCNC: 30.3 PG — SIGNIFICANT CHANGE UP (ref 27–34)
MCHC RBC-ENTMCNC: 31.4 % — LOW (ref 32–36)
MCV RBC AUTO: 96.6 FL — SIGNIFICANT CHANGE UP (ref 80–100)
MONOCYTES # BLD AUTO: 0.57 K/UL — SIGNIFICANT CHANGE UP (ref 0–0.9)
MONOCYTES NFR BLD AUTO: 5.1 % — SIGNIFICANT CHANGE UP (ref 2–14)
NEUTROPHILS # BLD AUTO: 6.82 K/UL — SIGNIFICANT CHANGE UP (ref 1.8–7.4)
NEUTROPHILS NFR BLD AUTO: 60.7 % — SIGNIFICANT CHANGE UP (ref 43–77)
NRBC # FLD: 0 K/UL — SIGNIFICANT CHANGE UP (ref 0–0)
PCO2 BLDV: 43 MMHG — SIGNIFICANT CHANGE UP (ref 41–51)
PH BLDV: 7.35 PH — SIGNIFICANT CHANGE UP (ref 7.32–7.43)
PLATELET # BLD AUTO: 364 K/UL — SIGNIFICANT CHANGE UP (ref 150–400)
PMV BLD: 9.7 FL — SIGNIFICANT CHANGE UP (ref 7–13)
PO2 BLDV: 52 MMHG — HIGH (ref 35–40)
POTASSIUM BLDV-SCNC: 3.3 MMOL/L — LOW (ref 3.4–4.5)
POTASSIUM SERPL-MCNC: 3.5 MMOL/L — SIGNIFICANT CHANGE UP (ref 3.5–5.3)
POTASSIUM SERPL-SCNC: 3.5 MMOL/L — SIGNIFICANT CHANGE UP (ref 3.5–5.3)
PROT SERPL-MCNC: 6.7 G/DL — SIGNIFICANT CHANGE UP (ref 6–8.3)
RBC # BLD: 3.86 M/UL — SIGNIFICANT CHANGE UP (ref 3.8–5.2)
RBC # FLD: 13.7 % — SIGNIFICANT CHANGE UP (ref 10.3–14.5)
SAO2 % BLDV: 84.6 % — SIGNIFICANT CHANGE UP (ref 60–85)
SARS-COV-2 RNA SPEC QL NAA+PROBE: SIGNIFICANT CHANGE UP
SODIUM SERPL-SCNC: 139 MMOL/L — SIGNIFICANT CHANGE UP (ref 135–145)
SPECIMEN SOURCE: SIGNIFICANT CHANGE UP
WBC # BLD: 11.22 K/UL — HIGH (ref 3.8–10.5)
WBC # FLD AUTO: 11.22 K/UL — HIGH (ref 3.8–10.5)

## 2020-09-18 PROCEDURE — 99226: CPT

## 2020-09-18 RX ORDER — INSULIN LISPRO 100/ML
VIAL (ML) SUBCUTANEOUS AT BEDTIME
Refills: 0 | Status: DISCONTINUED | OUTPATIENT
Start: 2020-09-18 | End: 2020-09-21

## 2020-09-18 RX ORDER — ACETAMINOPHEN 500 MG
650 TABLET ORAL ONCE
Refills: 0 | Status: COMPLETED | OUTPATIENT
Start: 2020-09-18 | End: 2020-09-18

## 2020-09-18 RX ORDER — INSULIN GLARGINE 100 [IU]/ML
27 INJECTION, SOLUTION SUBCUTANEOUS AT BEDTIME
Refills: 0 | Status: DISCONTINUED | OUTPATIENT
Start: 2020-09-18 | End: 2020-09-21

## 2020-09-18 RX ORDER — INSULIN LISPRO 100/ML
VIAL (ML) SUBCUTANEOUS
Refills: 0 | Status: DISCONTINUED | OUTPATIENT
Start: 2020-09-18 | End: 2020-09-21

## 2020-09-18 RX ORDER — TRAMADOL HYDROCHLORIDE 50 MG/1
50 TABLET ORAL ONCE
Refills: 0 | Status: DISCONTINUED | OUTPATIENT
Start: 2020-09-18 | End: 2020-09-18

## 2020-09-18 RX ORDER — INSULIN LISPRO 100/ML
9 VIAL (ML) SUBCUTANEOUS
Refills: 0 | Status: DISCONTINUED | OUTPATIENT
Start: 2020-09-18 | End: 2020-09-21

## 2020-09-18 RX ORDER — OXYCODONE HYDROCHLORIDE 5 MG/1
5 TABLET ORAL ONCE
Refills: 0 | Status: DISCONTINUED | OUTPATIENT
Start: 2020-09-18 | End: 2020-09-18

## 2020-09-18 RX ADMIN — LIDOCAINE 1 PATCH: 4 CREAM TOPICAL at 08:12

## 2020-09-18 RX ADMIN — OXYCODONE HYDROCHLORIDE 5 MILLIGRAM(S): 5 TABLET ORAL at 19:23

## 2020-09-18 RX ADMIN — TRAMADOL HYDROCHLORIDE 50 MILLIGRAM(S): 50 TABLET ORAL at 06:01

## 2020-09-18 RX ADMIN — OXYCODONE HYDROCHLORIDE 5 MILLIGRAM(S): 5 TABLET ORAL at 19:59

## 2020-09-18 RX ADMIN — CYCLOBENZAPRINE HYDROCHLORIDE 10 MILLIGRAM(S): 10 TABLET, FILM COATED ORAL at 17:37

## 2020-09-18 RX ADMIN — SERTRALINE 50 MILLIGRAM(S): 25 TABLET, FILM COATED ORAL at 12:13

## 2020-09-18 RX ADMIN — Medication 150 MILLIGRAM(S): at 17:36

## 2020-09-18 RX ADMIN — Medication 150 MILLIGRAM(S): at 06:04

## 2020-09-18 RX ADMIN — CYCLOBENZAPRINE HYDROCHLORIDE 10 MILLIGRAM(S): 10 TABLET, FILM COATED ORAL at 06:04

## 2020-09-18 RX ADMIN — SODIUM CHLORIDE 125 MILLILITER(S): 9 INJECTION INTRAMUSCULAR; INTRAVENOUS; SUBCUTANEOUS at 12:14

## 2020-09-18 RX ADMIN — Medication 40 MILLIGRAM(S): at 06:01

## 2020-09-18 RX ADMIN — ARIPIPRAZOLE 15 MILLIGRAM(S): 15 TABLET ORAL at 12:14

## 2020-09-18 RX ADMIN — PANTOPRAZOLE SODIUM 40 MILLIGRAM(S): 20 TABLET, DELAYED RELEASE ORAL at 12:14

## 2020-09-18 RX ADMIN — Medication 650 MILLIGRAM(S): at 18:13

## 2020-09-18 RX ADMIN — SODIUM CHLORIDE 125 MILLILITER(S): 9 INJECTION INTRAMUSCULAR; INTRAVENOUS; SUBCUTANEOUS at 07:42

## 2020-09-18 RX ADMIN — INSULIN GLARGINE 27 UNIT(S): 100 INJECTION, SOLUTION SUBCUTANEOUS at 00:36

## 2020-09-18 RX ADMIN — Medication 650 MILLIGRAM(S): at 17:37

## 2020-09-18 RX ADMIN — LIDOCAINE 1 PATCH: 4 CREAM TOPICAL at 11:57

## 2020-09-18 RX ADMIN — Medication 10 MILLIGRAM(S): at 06:01

## 2020-09-18 RX ADMIN — ONDANSETRON 4 MILLIGRAM(S): 8 TABLET, FILM COATED ORAL at 15:49

## 2020-09-19 VITALS
DIASTOLIC BLOOD PRESSURE: 70 MMHG | TEMPERATURE: 98 F | HEART RATE: 58 BPM | OXYGEN SATURATION: 99 % | SYSTOLIC BLOOD PRESSURE: 145 MMHG | RESPIRATION RATE: 14 BRPM

## 2020-09-19 LAB
ALBUMIN SERPL ELPH-MCNC: 3.4 G/DL — SIGNIFICANT CHANGE UP (ref 3.3–5)
ALP SERPL-CCNC: 138 U/L — HIGH (ref 40–120)
ALT FLD-CCNC: 19 U/L — SIGNIFICANT CHANGE UP (ref 4–33)
ANION GAP SERPL CALC-SCNC: 12 MMO/L — SIGNIFICANT CHANGE UP (ref 7–14)
AST SERPL-CCNC: 19 U/L — SIGNIFICANT CHANGE UP (ref 4–32)
BASE EXCESS BLDV CALC-SCNC: 1.5 MMOL/L — SIGNIFICANT CHANGE UP
BASOPHILS # BLD AUTO: 0.02 K/UL — SIGNIFICANT CHANGE UP (ref 0–0.2)
BASOPHILS NFR BLD AUTO: 0.2 % — SIGNIFICANT CHANGE UP (ref 0–2)
BILIRUB SERPL-MCNC: 0.3 MG/DL — SIGNIFICANT CHANGE UP (ref 0.2–1.2)
BLOOD GAS VENOUS - CREATININE: 0.73 MG/DL — SIGNIFICANT CHANGE UP (ref 0.5–1.3)
BLOOD GAS VENOUS - FIO2: 21 — SIGNIFICANT CHANGE UP
BUN SERPL-MCNC: 7 MG/DL — SIGNIFICANT CHANGE UP (ref 7–23)
CALCIUM SERPL-MCNC: 8.3 MG/DL — LOW (ref 8.4–10.5)
CHLORIDE BLDV-SCNC: 114 MMOL/L — HIGH (ref 96–108)
CHLORIDE SERPL-SCNC: 106 MMOL/L — SIGNIFICANT CHANGE UP (ref 98–107)
CO2 SERPL-SCNC: 23 MMOL/L — SIGNIFICANT CHANGE UP (ref 22–31)
CREAT SERPL-MCNC: 0.65 MG/DL — SIGNIFICANT CHANGE UP (ref 0.5–1.3)
EOSINOPHIL # BLD AUTO: 0.18 K/UL — SIGNIFICANT CHANGE UP (ref 0–0.5)
EOSINOPHIL NFR BLD AUTO: 2 % — SIGNIFICANT CHANGE UP (ref 0–6)
GAS PNL BLDV: 135 MMOL/L — LOW (ref 136–146)
GLUCOSE BLDV-MCNC: 102 MG/DL — HIGH (ref 70–99)
GLUCOSE SERPL-MCNC: 100 MG/DL — HIGH (ref 70–99)
HCO3 BLDV-SCNC: 24 MMOL/L — SIGNIFICANT CHANGE UP (ref 20–27)
HCT VFR BLD CALC: 35 % — SIGNIFICANT CHANGE UP (ref 34.5–45)
HCT VFR BLDV CALC: 49.6 % — HIGH (ref 34.5–45)
HGB BLD-MCNC: 11.2 G/DL — LOW (ref 11.5–15.5)
HGB BLDV-MCNC: 16.2 G/DL — HIGH (ref 11.5–15.5)
IMM GRANULOCYTES NFR BLD AUTO: 0.2 % — SIGNIFICANT CHANGE UP (ref 0–1.5)
LACTATE BLDV-MCNC: 1.5 MMOL/L — SIGNIFICANT CHANGE UP (ref 0.5–2)
LYMPHOCYTES # BLD AUTO: 3.82 K/UL — HIGH (ref 1–3.3)
LYMPHOCYTES # BLD AUTO: 42 % — SIGNIFICANT CHANGE UP (ref 13–44)
MCHC RBC-ENTMCNC: 29.6 PG — SIGNIFICANT CHANGE UP (ref 27–34)
MCHC RBC-ENTMCNC: 32 % — SIGNIFICANT CHANGE UP (ref 32–36)
MCV RBC AUTO: 92.3 FL — SIGNIFICANT CHANGE UP (ref 80–100)
MONOCYTES # BLD AUTO: 0.47 K/UL — SIGNIFICANT CHANGE UP (ref 0–0.9)
MONOCYTES NFR BLD AUTO: 5.2 % — SIGNIFICANT CHANGE UP (ref 2–14)
NEUTROPHILS # BLD AUTO: 4.58 K/UL — SIGNIFICANT CHANGE UP (ref 1.8–7.4)
NEUTROPHILS NFR BLD AUTO: 50.4 % — SIGNIFICANT CHANGE UP (ref 43–77)
NRBC # FLD: 0 K/UL — SIGNIFICANT CHANGE UP (ref 0–0)
PCO2 BLDV: 48 MMHG — SIGNIFICANT CHANGE UP (ref 41–51)
PH BLDV: 7.36 PH — SIGNIFICANT CHANGE UP (ref 7.32–7.43)
PLATELET # BLD AUTO: 376 K/UL — SIGNIFICANT CHANGE UP (ref 150–400)
PMV BLD: 9.6 FL — SIGNIFICANT CHANGE UP (ref 7–13)
PO2 BLDV: 33 MMHG — LOW (ref 35–40)
POTASSIUM BLDV-SCNC: 3.4 MMOL/L — SIGNIFICANT CHANGE UP (ref 3.4–4.5)
POTASSIUM SERPL-MCNC: 3.5 MMOL/L — SIGNIFICANT CHANGE UP (ref 3.5–5.3)
POTASSIUM SERPL-SCNC: 3.5 MMOL/L — SIGNIFICANT CHANGE UP (ref 3.5–5.3)
PROT SERPL-MCNC: 6.6 G/DL — SIGNIFICANT CHANGE UP (ref 6–8.3)
RBC # BLD: 3.79 M/UL — LOW (ref 3.8–5.2)
RBC # FLD: 13.2 % — SIGNIFICANT CHANGE UP (ref 10.3–14.5)
SAO2 % BLDV: 57.6 % — LOW (ref 60–85)
SODIUM SERPL-SCNC: 141 MMOL/L — SIGNIFICANT CHANGE UP (ref 135–145)
WBC # BLD: 9.09 K/UL — SIGNIFICANT CHANGE UP (ref 3.8–10.5)
WBC # FLD AUTO: 9.09 K/UL — SIGNIFICANT CHANGE UP (ref 3.8–10.5)

## 2020-09-19 PROCEDURE — 99217: CPT

## 2020-09-19 RX ORDER — ONDANSETRON 8 MG/1
1 TABLET, FILM COATED ORAL
Qty: 9 | Refills: 0
Start: 2020-09-19 | End: 2020-09-21

## 2020-09-19 RX ORDER — OXYCODONE HYDROCHLORIDE 5 MG/1
5 TABLET ORAL ONCE
Refills: 0 | Status: DISCONTINUED | OUTPATIENT
Start: 2020-09-19 | End: 2020-09-19

## 2020-09-19 RX ADMIN — CYCLOBENZAPRINE HYDROCHLORIDE 10 MILLIGRAM(S): 10 TABLET, FILM COATED ORAL at 05:34

## 2020-09-19 RX ADMIN — SERTRALINE 50 MILLIGRAM(S): 25 TABLET, FILM COATED ORAL at 09:26

## 2020-09-19 RX ADMIN — Medication 10 MILLIGRAM(S): at 05:34

## 2020-09-19 RX ADMIN — OXYCODONE HYDROCHLORIDE 5 MILLIGRAM(S): 5 TABLET ORAL at 00:00

## 2020-09-19 RX ADMIN — Medication 150 MILLIGRAM(S): at 05:34

## 2020-09-19 RX ADMIN — LIDOCAINE 1 PATCH: 4 CREAM TOPICAL at 00:18

## 2020-09-19 RX ADMIN — ARIPIPRAZOLE 15 MILLIGRAM(S): 15 TABLET ORAL at 12:06

## 2020-09-19 RX ADMIN — Medication 40 MILLIGRAM(S): at 05:34

## 2020-09-19 RX ADMIN — Medication 100 MILLIGRAM(S): at 00:13

## 2020-09-19 RX ADMIN — INSULIN GLARGINE 27 UNIT(S): 100 INJECTION, SOLUTION SUBCUTANEOUS at 00:43

## 2020-09-19 RX ADMIN — SODIUM CHLORIDE 125 MILLILITER(S): 9 INJECTION INTRAMUSCULAR; INTRAVENOUS; SUBCUTANEOUS at 00:12

## 2020-09-19 RX ADMIN — OXYCODONE HYDROCHLORIDE 5 MILLIGRAM(S): 5 TABLET ORAL at 09:23

## 2020-09-19 NOTE — ED CDU PROVIDER DISPOSITION NOTE - NSFOLLOWUPINSTRUCTIONS_ED_ALL_ED_FT
Follow up with primary care as scheduled  Follow up with Gastroenterology as scheduled    Continue your home medications  Take Zofran 4mg every 8 hours as needed for nausea/vomiting     Worsening, continued or new concerning symptoms, vomiting, abdominal pain, fever, return to the emergency department.

## 2020-09-19 NOTE — ED CDU PROVIDER SUBSEQUENT DAY NOTE - ATTENDING CONTRIBUTION TO CARE
Attending Statement: I have reviewed and agree with all pertinent clinical information, including history and physical exam and agree with treatment plan of the PA, except as noted.  51 yo female, PMH DM, HTN, neuropathy, "intercostal neuralgia", depression, hx/o "cardiac event" in the past (pt cannot give further details; denies hx/o MI, hx/o former cigarette smoker (quit 7 years ago), and active marijuana smoker (states smokes 3 - 4 times per day for pain management reasons; pt presented to the ED for generalized abdominal pain, N/V/D since early this morning.  No hx/o fevers or recent travel.  Overnight pt feeling better. This morning sitting up eating breakfast. no distress. no n/v/d   Labs improved. Dec in WBC and lactic to wnl. nl CMP. ct unremarkable.   PT advised close pmd and gastroenterology follow up SW help w transportation. PT comfortable  w plan.

## 2020-09-19 NOTE — ED CDU PROVIDER SUBSEQUENT DAY NOTE - PHYSICAL EXAMINATION
CONSTITUTIONAL:  Well appearing, awake, alert, oriented to person, place, time/situation and in no apparent distress.  Pt. is objectively comfortable appearing and verbalizing in full, clear, effortless sentences.  ENMT: NC/AT.  Airway patent.  Nasal mucosa clear.  Moist mucous membranes.  Neck supple.  EYES:  Clear OU.  CARDIAC:  Normal rate, regular rhythm.  Heart sounds S1 S2.  No murmurs, gallops, or rubs.  RESPIRATORY:  Breath sounds clear and equal bilaterally.  No wheezes, no rales, no rhonchi.  GASTROINTESTINAL:  Abdomen soft, non-distended, non-tender.  No rebound, no guarding.  MUSCULOSKELETAL:  Range of motion is not limited.  Gait stable.    SKIN:  Skin color unremarkable.  Skin warm, dry, and intact.    PSYCHIATRIC:  Alert and oriented to person/place/time/situation.  Mood and affect WNL.  No apparent risk to self or others.
CONSTITUTIONAL:  Well appearing, awake, alert, oriented to person, place, time/situation and in no apparent distress.  Pt. is objectively comfortable appearing and verbalizing in full, clear, effortless sentences.  ENMT: NC/AT.  Airway patent.  Nasal mucosa clear.  Moist mucous membranes.  Neck supple.  EYES:  Clear OU.  CARDIAC:  Normal rate, regular rhythm.  Heart sounds S1 S2.  No murmurs, gallops, or rubs.  RESPIRATORY:  Breath sounds clear and equal bilaterally.  No wheezes, no rales, no rhonchi.  GASTROINTESTINAL:  +BS throughout.  Abdomen soft, non-distended, objectively non-tender.  No rebound, no guarding.  MUSCULOSKELETAL:  Range of motion is not limited.  .    SKIN:  Skin color unremarkable.  Skin warm, dry, and intact.    PSYCHIATRIC:  Alert and oriented to person/place/time/situation.  Mood and affect WNL.  No apparent risk to self or others.

## 2020-09-19 NOTE — ED CDU PROVIDER SUBSEQUENT DAY NOTE - HISTORY
51 yo female, PMH DM, HTN, neuropathy, "intercostal neuralgia", depression, hx/o "cardiac event" in the past (pt cannot give further details; denies hx/o MI, hx/o former cigarette smoker (quit 7 years ago), and active marijuana smoker (states smokes 3 - 4 times per day for "pain management" reasons; pt presented to the ED for generalized abdominal pain, N/V/D, onset 9/17/20 early morning time.  No hx/o fevers or recent travel.  On initial ED evaluation: WBC 18.61, serum glucose 200, anion gap 12, ; CMP otherwise unremarkable.  Lipase 32.1, beta hydroxy butyrate 0.1, VBG pH 7.35, lactate 2.2, BHCG <5, UA +small blood, 6-10 RBC; UA negative for ketones/leuk/nitrites.  CT abd/pelvis was performed: ".....IMPRESSION:  Hepatomegaly. Otherwise, unremarkable exam.".  Pt was offered a PO challenge in the ED which pt was unable to tolerate; pt was dispo'd to CDU for continued care plan:  IV hydration, antiemetics PRN, supportive care, am labs, general observation care / monitoring.  Goal for subjective/objective improvement and tolerance of PO challenge.  In the interim, 51 yo female, PMH DM, HTN, neuropathy, "intercostal neuralgia", depression, hx/o "cardiac event" in the past (pt cannot give further details; denies hx/o MI, hx/o former cigarette smoker (quit 7 years ago), and active marijuana smoker (states smokes 3 - 4 times per day for "pain management" reasons; pt presented to the ED for generalized abdominal pain, N/V/D, onset 9/17/20 early morning time.  No hx/o fevers or recent travel.  On initial ED evaluation: WBC 18.61, serum glucose 200, anion gap 12, ; CMP otherwise unremarkable.  Lipase 32.1, beta hydroxy butyrate 0.1, VBG pH 7.35, lactate 2.2, BHCG <5, UA +small blood, 6-10 RBC; UA negative for ketones/leuk/nitrites.  CT abd/pelvis was performed: ".....IMPRESSION:  Hepatomegaly. Otherwise, unremarkable exam.".  Pt was offered a PO challenge in the ED which pt was unable to tolerate; pt was dispo'd to CDU for continued care plan:  IV hydration, antiemetics PRN, supportive care, am labs, general observation care / monitoring.  Goal for subjective/objective improvement and tolerance of PO challenge.  On 9/17 daytime, repeat labs showed improved WBC count (11.22); CMP:  ALP downtrended to 138; CMP was otherwise unremarkable.  Lactate improved to 1.6.  Per CDU day RN 9/17/20, pt tolerated clears at breakfast and diet was advanced at lunch; pt reportedly tolerated lunch and dinner meal without incident.  Per CDU day providers 9/17, pt had verbalized that she did not feel comfortable going home as of yet, so was kept for additional day of supportive care.  In the interim, pt objectively noted to be resting comfortably; no issues thus far.

## 2020-09-19 NOTE — ED CDU PROVIDER SUBSEQUENT DAY NOTE - HISTORY
CDU Initial Day Note: 51 y/o F hx DM, HTN, neuropathy, on insulin and gabapentin, presents with sudden onset N/V diarrhea since 3 am. Both non bloody, billious No sick contacts, no one else in home who ate same food is symptomatic. Denies fevers. Had a similar episode in july for which she was reportedly admitted for hypokalemia. Currently feels ill, generalized abdominal pain most prominent suprapubic. States increased urinary pressure. No recent abx. Denies cp, sob, syncope.   CDU Subsequent Day Note: JACKSON Tan, Agree w/ above, pt still c/o nausea and diarrhea post-prandially, will continue to obs overnight for symptom management, denies fevers, chills, cp, sob, cough, ha, dizziness.

## 2020-09-19 NOTE — PROVIDER CONTACT NOTE (OTHER) - ASSESSMENT
SW contacted by Pt’s Clinical Provider [id number] who states Pt is cleared for discharge and requests Transport assistance to return home. SW met with Pt A&Ox3 who confirmed address on file, states she has keys, and has family home to receive her. MD confirms Pt appropriate for taxi.   SW contacted [facility] [phone] spoke with staff Tomasa who provided trip #[id number] and states SW to be contacted by Routing Dept. when accepting company is confirmed. Pt has no questions or concerns at this time. Clinical provider is in agreement with plan.  SW remains available.

## 2020-09-19 NOTE — ED CDU PROVIDER SUBSEQUENT DAY NOTE - PROGRESS NOTE DETAILS
JACKSON Gregg- pt feeling better. abdomen soft, NT, ND. tolerating po. pt requesting home dose of oxycodone for chronic back pain. pt amenable for dc. SW to arrange transportation home

## 2020-09-19 NOTE — ED CDU PROVIDER DISPOSITION NOTE - PATIENT PORTAL LINK FT
You can access the FollowMyHealth Patient Portal offered by [facility] by registering at the following website: [email or web address]. By joining [email or web address]’s FollowMyHealth portal, you will also be able to view your health information using other applications (apps) compatible with our system.

## 2020-09-19 NOTE — ED CDU PROVIDER DISPOSITION NOTE - CLINICAL COURSE
51 yo female, PMH DM, HTN, neuropathy, "intercostal neuralgia", depression, hx/o "cardiac event" in the past (pt cannot give further details; denies hx/o MI, hx/o former cigarette smoker (quit 7 years ago), and active marijuana smoker (states smokes 3 - 4 times per day for pain management reasons; pt presented to the ED for generalized abdominal pain, N/V/D since early this morning.  No hx/o fevers or recent travel.  Overnight pt feeling better. This morning sitting up eating breakfast. no distress. no n/v/d   Labs improved. Dec in WBC and lactic to wnl. nl CMP. ct unremarkable.   PT advised close pmd and gastroenterology follow up SW help w transportation. PT comfortable  w plan.

## 2020-09-21 ENCOUNTER — TRANSCRIPTION ENCOUNTER (OUTPATIENT)
Age: 50
End: 2020-09-21

## 2020-09-25 ENCOUNTER — APPOINTMENT (OUTPATIENT)
Dept: GASTROENTEROLOGY | Facility: CLINIC | Age: 50
End: 2020-09-25

## 2020-10-05 PROBLEM — Z00.00 ENCOUNTER FOR PREVENTIVE HEALTH EXAMINATION: Status: ACTIVE | Noted: 2020-10-05

## 2020-10-06 ENCOUNTER — APPOINTMENT (OUTPATIENT)
Dept: GASTROENTEROLOGY | Facility: CLINIC | Age: 50
End: 2020-10-06

## 2020-10-09 ENCOUNTER — TRANSCRIPTION ENCOUNTER (OUTPATIENT)
Age: 50
End: 2020-10-09

## 2020-10-10 ENCOUNTER — INPATIENT (INPATIENT)
Facility: HOSPITAL | Age: 50
LOS: 3 days | Discharge: HOME | End: 2020-10-14
Attending: INTERNAL MEDICINE | Admitting: INTERNAL MEDICINE
Payer: MEDICARE

## 2020-10-10 VITALS
DIASTOLIC BLOOD PRESSURE: 93 MMHG | HEART RATE: 85 BPM | WEIGHT: 281.97 LBS | OXYGEN SATURATION: 95 % | TEMPERATURE: 98 F | RESPIRATION RATE: 16 BRPM | HEIGHT: 68 IN | SYSTOLIC BLOOD PRESSURE: 139 MMHG

## 2020-10-10 DIAGNOSIS — E11.65 TYPE 2 DIABETES MELLITUS WITH HYPERGLYCEMIA: ICD-10-CM

## 2020-10-10 DIAGNOSIS — K29.00 ACUTE GASTRITIS WITHOUT BLEEDING: ICD-10-CM

## 2020-10-10 DIAGNOSIS — Z90.49 ACQUIRED ABSENCE OF OTHER SPECIFIED PARTS OF DIGESTIVE TRACT: Chronic | ICD-10-CM

## 2020-10-10 DIAGNOSIS — M79.2 NEURALGIA AND NEURITIS, UNSPECIFIED: ICD-10-CM

## 2020-10-10 DIAGNOSIS — R11.10 VOMITING, UNSPECIFIED: ICD-10-CM

## 2020-10-10 DIAGNOSIS — Z29.9 ENCOUNTER FOR PROPHYLACTIC MEASURES, UNSPECIFIED: ICD-10-CM

## 2020-10-10 DIAGNOSIS — I10 ESSENTIAL (PRIMARY) HYPERTENSION: ICD-10-CM

## 2020-10-10 DIAGNOSIS — Z98.890 OTHER SPECIFIED POSTPROCEDURAL STATES: Chronic | ICD-10-CM

## 2020-10-10 LAB
ALBUMIN SERPL ELPH-MCNC: 3.7 G/DL — SIGNIFICANT CHANGE UP (ref 3.3–5)
ALP SERPL-CCNC: 165 U/L — HIGH (ref 40–120)
ALT FLD-CCNC: 24 U/L — SIGNIFICANT CHANGE UP (ref 12–78)
AMYLASE P1 CFR SERPL: 55 U/L — SIGNIFICANT CHANGE UP (ref 25–115)
ANION GAP SERPL CALC-SCNC: 9 MMOL/L — SIGNIFICANT CHANGE UP (ref 5–17)
AST SERPL-CCNC: 19 U/L — SIGNIFICANT CHANGE UP (ref 15–37)
BASOPHILS # BLD AUTO: 0.02 K/UL — SIGNIFICANT CHANGE UP (ref 0–0.2)
BASOPHILS NFR BLD AUTO: 0.1 % — SIGNIFICANT CHANGE UP (ref 0–2)
BILIRUB SERPL-MCNC: 0.7 MG/DL — SIGNIFICANT CHANGE UP (ref 0.2–1.2)
BUN SERPL-MCNC: 12 MG/DL — SIGNIFICANT CHANGE UP (ref 7–23)
CALCIUM SERPL-MCNC: 9.2 MG/DL — SIGNIFICANT CHANGE UP (ref 8.5–10.1)
CHLORIDE SERPL-SCNC: 105 MMOL/L — SIGNIFICANT CHANGE UP (ref 96–108)
CO2 SERPL-SCNC: 26 MMOL/L — SIGNIFICANT CHANGE UP (ref 22–31)
CREAT SERPL-MCNC: 0.9 MG/DL — SIGNIFICANT CHANGE UP (ref 0.5–1.3)
EOSINOPHIL # BLD AUTO: 0.07 K/UL — SIGNIFICANT CHANGE UP (ref 0–0.5)
EOSINOPHIL NFR BLD AUTO: 0.4 % — SIGNIFICANT CHANGE UP (ref 0–6)
GLUCOSE BLDC GLUCOMTR-MCNC: 105 MG/DL — HIGH (ref 70–99)
GLUCOSE BLDC GLUCOMTR-MCNC: 131 MG/DL — HIGH (ref 70–99)
GLUCOSE BLDC GLUCOMTR-MCNC: 152 MG/DL — HIGH (ref 70–99)
GLUCOSE SERPL-MCNC: 178 MG/DL — HIGH (ref 70–99)
HCG SERPL-ACNC: <1 MIU/ML — SIGNIFICANT CHANGE UP
HCT VFR BLD CALC: 45.3 % — HIGH (ref 34.5–45)
HGB BLD-MCNC: 14.7 G/DL — SIGNIFICANT CHANGE UP (ref 11.5–15.5)
IMM GRANULOCYTES NFR BLD AUTO: 0.2 % — SIGNIFICANT CHANGE UP (ref 0–1.5)
LACTATE SERPL-SCNC: 2.4 MMOL/L — HIGH (ref 0.7–2)
LACTATE SERPL-SCNC: 2.7 MMOL/L — HIGH (ref 0.7–2)
LACTATE SERPL-SCNC: 3.1 MMOL/L — HIGH (ref 0.7–2)
LIDOCAIN IGE QN: 193 U/L — SIGNIFICANT CHANGE UP (ref 73–393)
LYMPHOCYTES # BLD AUTO: 33.1 % — SIGNIFICANT CHANGE UP (ref 13–44)
LYMPHOCYTES # BLD AUTO: 5.59 K/UL — HIGH (ref 1–3.3)
MCHC RBC-ENTMCNC: 29.6 PG — SIGNIFICANT CHANGE UP (ref 27–34)
MCHC RBC-ENTMCNC: 32.5 GM/DL — SIGNIFICANT CHANGE UP (ref 32–36)
MCV RBC AUTO: 91.1 FL — SIGNIFICANT CHANGE UP (ref 80–100)
MONOCYTES # BLD AUTO: 0.52 K/UL — SIGNIFICANT CHANGE UP (ref 0–0.9)
MONOCYTES NFR BLD AUTO: 3.1 % — SIGNIFICANT CHANGE UP (ref 2–14)
NEUTROPHILS # BLD AUTO: 10.64 K/UL — HIGH (ref 1.8–7.4)
NEUTROPHILS NFR BLD AUTO: 63.1 % — SIGNIFICANT CHANGE UP (ref 43–77)
NRBC # BLD: 0 /100 WBCS — SIGNIFICANT CHANGE UP (ref 0–0)
PLATELET # BLD AUTO: 525 K/UL — HIGH (ref 150–400)
POTASSIUM SERPL-MCNC: 3.2 MMOL/L — LOW (ref 3.5–5.3)
POTASSIUM SERPL-SCNC: 3.2 MMOL/L — LOW (ref 3.5–5.3)
PROT SERPL-MCNC: 8.7 GM/DL — HIGH (ref 6–8.3)
RBC # BLD: 4.97 M/UL — SIGNIFICANT CHANGE UP (ref 3.8–5.2)
RBC # FLD: 13.4 % — SIGNIFICANT CHANGE UP (ref 10.3–14.5)
SARS-COV-2 RNA SPEC QL NAA+PROBE: SIGNIFICANT CHANGE UP
SODIUM SERPL-SCNC: 140 MMOL/L — SIGNIFICANT CHANGE UP (ref 135–145)
WBC # BLD: 16.88 K/UL — HIGH (ref 3.8–10.5)
WBC # FLD AUTO: 16.88 K/UL — HIGH (ref 3.8–10.5)

## 2020-10-10 PROCEDURE — 12345: CPT | Mod: NC

## 2020-10-10 PROCEDURE — 71045 X-RAY EXAM CHEST 1 VIEW: CPT | Mod: 26

## 2020-10-10 PROCEDURE — 74177 CT ABD & PELVIS W/CONTRAST: CPT | Mod: 26,MA

## 2020-10-10 PROCEDURE — 99285 EMERGENCY DEPT VISIT HI MDM: CPT

## 2020-10-10 PROCEDURE — 99223 1ST HOSP IP/OBS HIGH 75: CPT | Mod: AI

## 2020-10-10 RX ORDER — DEXTROSE 50 % IN WATER 50 %
12.5 SYRINGE (ML) INTRAVENOUS ONCE
Refills: 0 | Status: DISCONTINUED | OUTPATIENT
Start: 2020-10-10 | End: 2020-10-14

## 2020-10-10 RX ORDER — INFLUENZA VIRUS VACCINE 15; 15; 15; 15 UG/.5ML; UG/.5ML; UG/.5ML; UG/.5ML
0.5 SUSPENSION INTRAMUSCULAR ONCE
Refills: 0 | Status: DISCONTINUED | OUTPATIENT
Start: 2020-10-10 | End: 2020-10-14

## 2020-10-10 RX ORDER — DEXTROSE 50 % IN WATER 50 %
15 SYRINGE (ML) INTRAVENOUS ONCE
Refills: 0 | Status: DISCONTINUED | OUTPATIENT
Start: 2020-10-10 | End: 2020-10-14

## 2020-10-10 RX ORDER — ENOXAPARIN SODIUM 100 MG/ML
40 INJECTION SUBCUTANEOUS DAILY
Refills: 0 | Status: DISCONTINUED | OUTPATIENT
Start: 2020-10-10 | End: 2020-10-14

## 2020-10-10 RX ORDER — METOCLOPRAMIDE HCL 10 MG
10 TABLET ORAL ONCE
Refills: 0 | Status: COMPLETED | OUTPATIENT
Start: 2020-10-10 | End: 2020-10-10

## 2020-10-10 RX ORDER — INSULIN GLARGINE 100 [IU]/ML
10 INJECTION, SOLUTION SUBCUTANEOUS AT BEDTIME
Refills: 0 | Status: DISCONTINUED | OUTPATIENT
Start: 2020-10-10 | End: 2020-10-14

## 2020-10-10 RX ORDER — POTASSIUM CHLORIDE 20 MEQ
40 PACKET (EA) ORAL EVERY 4 HOURS
Refills: 0 | Status: COMPLETED | OUTPATIENT
Start: 2020-10-10 | End: 2020-10-10

## 2020-10-10 RX ORDER — ONDANSETRON 8 MG/1
4 TABLET, FILM COATED ORAL EVERY 6 HOURS
Refills: 0 | Status: DISCONTINUED | OUTPATIENT
Start: 2020-10-10 | End: 2020-10-14

## 2020-10-10 RX ORDER — INSULIN LISPRO 100/ML
6 VIAL (ML) SUBCUTANEOUS
Refills: 0 | Status: DISCONTINUED | OUTPATIENT
Start: 2020-10-10 | End: 2020-10-14

## 2020-10-10 RX ORDER — ACETAMINOPHEN 500 MG
650 TABLET ORAL EVERY 6 HOURS
Refills: 0 | Status: DISCONTINUED | OUTPATIENT
Start: 2020-10-10 | End: 2020-10-14

## 2020-10-10 RX ORDER — PANTOPRAZOLE SODIUM 20 MG/1
40 TABLET, DELAYED RELEASE ORAL
Refills: 0 | Status: DISCONTINUED | OUTPATIENT
Start: 2020-10-10 | End: 2020-10-12

## 2020-10-10 RX ORDER — SODIUM CHLORIDE 9 MG/ML
1000 INJECTION, SOLUTION INTRAVENOUS
Refills: 0 | Status: DISCONTINUED | OUTPATIENT
Start: 2020-10-10 | End: 2020-10-14

## 2020-10-10 RX ORDER — DEXTROSE 50 % IN WATER 50 %
25 SYRINGE (ML) INTRAVENOUS ONCE
Refills: 0 | Status: DISCONTINUED | OUTPATIENT
Start: 2020-10-10 | End: 2020-10-14

## 2020-10-10 RX ORDER — OXYCODONE AND ACETAMINOPHEN 5; 325 MG/1; MG/1
1 TABLET ORAL EVERY 6 HOURS
Refills: 0 | Status: DISCONTINUED | OUTPATIENT
Start: 2020-10-10 | End: 2020-10-14

## 2020-10-10 RX ORDER — SODIUM CHLORIDE 9 MG/ML
2000 INJECTION INTRAMUSCULAR; INTRAVENOUS; SUBCUTANEOUS ONCE
Refills: 0 | Status: COMPLETED | OUTPATIENT
Start: 2020-10-10 | End: 2020-10-10

## 2020-10-10 RX ORDER — GLUCAGON INJECTION, SOLUTION 0.5 MG/.1ML
1 INJECTION, SOLUTION SUBCUTANEOUS ONCE
Refills: 0 | Status: DISCONTINUED | OUTPATIENT
Start: 2020-10-10 | End: 2020-10-14

## 2020-10-10 RX ORDER — LIDOCAINE 4 G/100G
2 CREAM TOPICAL DAILY
Refills: 0 | Status: DISCONTINUED | OUTPATIENT
Start: 2020-10-10 | End: 2020-10-14

## 2020-10-10 RX ORDER — SODIUM CHLORIDE 9 MG/ML
1000 INJECTION, SOLUTION INTRAVENOUS
Refills: 0 | Status: DISCONTINUED | OUTPATIENT
Start: 2020-10-10 | End: 2020-10-11

## 2020-10-10 RX ORDER — ONDANSETRON 8 MG/1
4 TABLET, FILM COATED ORAL ONCE
Refills: 0 | Status: COMPLETED | OUTPATIENT
Start: 2020-10-10 | End: 2020-10-10

## 2020-10-10 RX ORDER — INSULIN LISPRO 100/ML
VIAL (ML) SUBCUTANEOUS
Refills: 0 | Status: DISCONTINUED | OUTPATIENT
Start: 2020-10-10 | End: 2020-10-14

## 2020-10-10 RX ORDER — SERTRALINE 25 MG/1
50 TABLET, FILM COATED ORAL DAILY
Refills: 0 | Status: DISCONTINUED | OUTPATIENT
Start: 2020-10-10 | End: 2020-10-14

## 2020-10-10 RX ORDER — PANTOPRAZOLE SODIUM 20 MG/1
8 TABLET, DELAYED RELEASE ORAL
Qty: 80 | Refills: 0 | Status: DISCONTINUED | OUTPATIENT
Start: 2020-10-10 | End: 2020-10-10

## 2020-10-10 RX ADMIN — Medication 40 MILLIEQUIVALENT(S): at 10:25

## 2020-10-10 RX ADMIN — PANTOPRAZOLE SODIUM 40 MILLIGRAM(S): 20 TABLET, DELAYED RELEASE ORAL at 17:35

## 2020-10-10 RX ADMIN — Medication 650 MILLIGRAM(S): at 12:01

## 2020-10-10 RX ADMIN — ONDANSETRON 4 MILLIGRAM(S): 8 TABLET, FILM COATED ORAL at 03:33

## 2020-10-10 RX ADMIN — Medication 150 MILLIGRAM(S): at 17:35

## 2020-10-10 RX ADMIN — Medication 10 MILLIGRAM(S): at 12:41

## 2020-10-10 RX ADMIN — Medication 6 UNIT(S): at 16:10

## 2020-10-10 RX ADMIN — Medication 10 MILLIGRAM(S): at 02:00

## 2020-10-10 RX ADMIN — Medication 40 MILLIEQUIVALENT(S): at 13:20

## 2020-10-10 RX ADMIN — SODIUM CHLORIDE 125 MILLILITER(S): 9 INJECTION, SOLUTION INTRAVENOUS at 23:50

## 2020-10-10 RX ADMIN — SODIUM CHLORIDE 125 MILLILITER(S): 9 INJECTION, SOLUTION INTRAVENOUS at 16:16

## 2020-10-10 RX ADMIN — LIDOCAINE 2 PATCH: 4 CREAM TOPICAL at 19:35

## 2020-10-10 RX ADMIN — PANTOPRAZOLE SODIUM 10 MG/HR: 20 TABLET, DELAYED RELEASE ORAL at 03:32

## 2020-10-10 RX ADMIN — OXYCODONE AND ACETAMINOPHEN 1 TABLET(S): 5; 325 TABLET ORAL at 15:10

## 2020-10-10 RX ADMIN — OXYCODONE AND ACETAMINOPHEN 1 TABLET(S): 5; 325 TABLET ORAL at 22:50

## 2020-10-10 RX ADMIN — OXYCODONE AND ACETAMINOPHEN 1 TABLET(S): 5; 325 TABLET ORAL at 14:34

## 2020-10-10 RX ADMIN — ENOXAPARIN SODIUM 40 MILLIGRAM(S): 100 INJECTION SUBCUTANEOUS at 16:17

## 2020-10-10 RX ADMIN — SERTRALINE 50 MILLIGRAM(S): 25 TABLET, FILM COATED ORAL at 12:41

## 2020-10-10 RX ADMIN — SODIUM CHLORIDE 2000 MILLILITER(S): 9 INJECTION INTRAMUSCULAR; INTRAVENOUS; SUBCUTANEOUS at 04:23

## 2020-10-10 RX ADMIN — SODIUM CHLORIDE 2000 MILLILITER(S): 9 INJECTION INTRAMUSCULAR; INTRAVENOUS; SUBCUTANEOUS at 02:00

## 2020-10-10 RX ADMIN — OXYCODONE AND ACETAMINOPHEN 1 TABLET(S): 5; 325 TABLET ORAL at 21:58

## 2020-10-10 RX ADMIN — LIDOCAINE 2 PATCH: 4 CREAM TOPICAL at 16:12

## 2020-10-10 RX ADMIN — Medication 650 MILLIGRAM(S): at 12:35

## 2020-10-10 RX ADMIN — SODIUM CHLORIDE 125 MILLILITER(S): 9 INJECTION, SOLUTION INTRAVENOUS at 07:28

## 2020-10-10 RX ADMIN — PANTOPRAZOLE SODIUM 10 MG/HR: 20 TABLET, DELAYED RELEASE ORAL at 13:20

## 2020-10-10 NOTE — ED PROVIDER NOTE - OBJECTIVE STATEMENT
Pertinent PMH/PSH/FHx/SHx and Review of Systems contained within:  Patient presents to the ED for nausea, vomiting, and diarrhea x3 days.  Patient has history of colitis and IBS, recently diagnosed.  Says that she has been taking her home medications without relief.  Patient has not been able to keep down any food or fluids.  Denies fever, sick contacts, or travel, reports generalized pain in abdomen.  Denies any vaginal discharge, abnormal bleeding, or urinary complaints.  Patient appears in great distress and is sitting in diarrhea.     Relevant PMHx/SHx/SOCHx/FAMH:  HTN, HLD, DM, intercostal nerve injury, cholecystectomy, colitis, IBS  +Marijuana user, denies drugs or alcohol    ROS: No fever/chills, No headache/photophobia/eye pain/changes in vision, No ear pain/sore throat/dysphagia, No chest pain/palpitations, no SOB/cough/wheeze/stridor, No melena, no dysuria/frequency/discharge, No neck/back pain, no rash, no changes in neurological status/function. Pertinent PMH/PSH/FHx/SHx and Review of Systems contained within:  Patient presents to the ED for nausea, vomiting, and diarrhea x3 days.  Patient has history of colitis and IBS, recently diagnosed.  Says that she has been taking her home medications without relief.  Patient has not been able to keep down any food or fluids.  Denies fever, sick contacts, or travel, reports generalized pain in abdomen.  Denies any vaginal discharge, abnormal bleeding, or urinary complaints.  Patient appears in great distress and is sitting in diarrhea.  She says that she was supposed to follow up with a newly established gastroenterologist but has been too sick to.      Relevant PMHx/SHx/SOCHx/FAMH:  HTN, HLD, DM, intercostal nerve injury, cholecystectomy, colitis, IBS  +Marijuana user, denies drugs or alcohol    ROS: No fever/chills, No headache/photophobia/eye pain/changes in vision, No ear pain/sore throat/dysphagia, No chest pain/palpitations, no SOB/cough/wheeze/stridor, No melena, no dysuria/frequency/discharge, No neck/back pain, no rash, no changes in neurological status/function. Pertinent PMH/PSH/FHx/SHx and Review of Systems contained within:  Patient presents to the ED for nausea, vomiting, and diarrhea x3 days.  Patient has history of Crohn's and IBS, recently diagnosed.  Says that she has been taking her home medications without relief.  Patient has not been able to keep down any food or fluids.  Denies fever, sick contacts, or travel, reports generalized pain in abdomen.  Denies any vaginal discharge, abnormal bleeding, or urinary complaints.  Patient appears in great distress and is sitting in diarrhea.  She says that she was supposed to follow up with a newly established gastroenterologist but has been too sick to.      Relevant PMHx/SHx/SOCHx/FAMH:  HTN, HLD, DM, intercostal nerve injury, cholecystectomy, Crohns Disease, IBS  +Marijuana user, denies drugs or alcohol    ROS: No fever/chills, No headache/photophobia/eye pain/changes in vision, No ear pain/sore throat/dysphagia, No chest pain/palpitations, no SOB/cough/wheeze/stridor, No melena, no dysuria/frequency/discharge, No neck/back pain, no rash, no changes in neurological status/function.

## 2020-10-10 NOTE — PROGRESS NOTE ADULT - PROBLEM SELECTOR PLAN 4
Continue Lyrica  Also uses Lidoderm -- R side, thought to originate from nerve impingements at T7 & T8  Continue Lyrica  Also uses Lidoderm -- 2 patchs; one at R T7, one at R T8

## 2020-10-10 NOTE — H&P ADULT - NSHPLABSRESULTS_GEN_ALL_CORE
LABS:                        14.7   16.88 )-----------( 525      ( 10 Oct 2020 01:56 )             45.3     10-10    140  |  105  |  12  ----------------------------<  178<H>  3.2<L>   |  26  |  0.90    Ca    9.2      10 Oct 2020 01:56    TPro  8.7<H>  /  Alb  3.7  /  TBili  0.7  /  DBili  x   /  AST  19  /  ALT  24  /  AlkPhos  165<H>  10-10        CAPILLARY BLOOD GLUCOSE            RADIOLOGY & ADDITIONAL TESTS:    Imaging Personally Reviewed:  [ X] YES  [ ] NO

## 2020-10-10 NOTE — PROGRESS NOTE ADULT - PROBLEM SELECTOR PLAN 3
ss insulin coverage  Decrease lantus to 10u and adjust as needed Normally takes Tresiba 80 units EVERY 3RD NIGHT -- last was 10/8  Also takes short-acting insulin, 12 units w/ meals, but only if BS > 200  Also takes Metformin 1000mg BID  While in-house, use 6 U w/ meals, + SSI; Decrease Lantus to 10u and adjust as needed

## 2020-10-10 NOTE — ED ADULT NURSE REASSESSMENT NOTE - NS ED NURSE REASSESS COMMENT FT1
Patient admitted to medicine for gastritis awaiting bed availability. Report handoff to TERESA Díaz of ED holding. Pending transport for transfer. No pending stat orders noted.
Patient sleeping in stretcher at the moment, stopped vomiting and no bowel movement noted after the patient had 2 episodes of diarrhea in the ED. MD made aware about pt CT scan results. Pending MD re-assessment and disposition. Will continue to monitor.

## 2020-10-10 NOTE — H&P ADULT - NSHPPHYSICALEXAM_GEN_ALL_CORE
Vital Signs Last 24 Hrs  T(C): 36.8 (10 Oct 2020 05:59), Max: 36.8 (10 Oct 2020 05:59)  T(F): 98.3 (10 Oct 2020 05:59), Max: 98.3 (10 Oct 2020 05:59)  HR: 77 (10 Oct 2020 05:59) (77 - 88)  BP: 127/71 (10 Oct 2020 05:59) (127/71 - 139/93)  BP(mean): --  RR: 18 (10 Oct 2020 05:59) (16 - 18)  SpO2: 100% (10 Oct 2020 05:59) (94% - 100%)    PHYSICAL EXAM:    GENERAL: NAD, well-groomed, well-developed  HEAD:  Atraumatic, Normocephalic  EYES: EOMI, PERRLA, conjunctiva and sclera clear  ENMT: No tonsillar erythema, exudates, or enlargement; Moist mucous membranes, No lesions  NECK: Supple, No JVD, Normal thyroid  NERVOUS SYSTEM:  Alert & Oriented X3, Motor Strength 5/5 B/L upper and lower extremities;   CHEST/LUNG: Clear to percussion bilaterally; No rales, rhonchi, wheezing, or rubs  HEART: Regular rate and rhythm; No rubs, or gallops, +S1,S2  ABDOMEN: Soft, Nontender, Nondistended; Bowel sounds present  EXTREMITIES:  2+ Peripheral Pulses, No clubbing, cyanosis, or edema  LYMPH: No cervical adenopathy  RECTAL: deferred  BREAST: deferred  : deferred  SKIN: No rashes or lesions    IMPROVE VTE Individual Risk Assessment        RISK                                                          Points  [  ] Previous VTE                                                3  [  ] Thrombophilia                                             2  [  ] Lower limb paralysis                                    2        (unable to hold up >15 seconds)    [  ] Current Cancer                                             2         (within 6 months)  [  x] Immobilization > 24 hrs                              1  [  ] ICU/CCU stay > 24 hours                            1  [  ] Age > 60                                                    1  IMPROVE VTE Score _____1____

## 2020-10-10 NOTE — PROGRESS NOTE ADULT - SUBJECTIVE AND OBJECTIVE BOX
Pt admitted after midnight, so already seen and examined by Nocturnist today. I reviewed chart, discussed w/ and examined patient, but not a formal billable "follow up" visit.

## 2020-10-10 NOTE — ED PROVIDER NOTE - CLINICAL SUMMARY MEDICAL DECISION MAKING FREE TEXT BOX
PAtient with vomiting and diarrhea, no acute surgical findings on CT. VSS.  Symptoms improved with ER measures, however patient still feeling some discomfort.  Patient is to be admitted to the hospital and the case was discussed with the admitting physician.  Any changes in plan, additional imaging/labs, and further work up will be at the discretion of the admitting physician. PAtient with vomiting and diarrhea, no acute surgical findings on CT. VSS.  Symptoms improved with ER measures, however patient still feeling some discomfort.  Patient is to be admitted to the hospital and the case was discussed with the admitting physician.  Any changes in plan, additional imaging/labs, and further work up will be at the discretion of the admitting physician. Per discussion with admitting physician, all necessary consults for admitted patients to be obtained by morning team unless patient requires emergent intervention or medical advice by specialist overnight.

## 2020-10-10 NOTE — ED ADULT NURSE NOTE - OBJECTIVE STATEMENT
50F aaox3, ambulatory bibems from home with c/o diffuse abd pain accompanied by diarrhea, nausea and vomiting. Patient actively vomiting and having loose bowel movement. Afebrile, abd soft, non tender. VS wnl.  IV access inserted by Mahogany Nair, labs sent, due meds given as ordered.

## 2020-10-10 NOTE — PROGRESS NOTE ADULT - PROBLEM SELECTOR PLAN 2
Viral gastroenteritis?  Zofran prn  clear diet  IV hydration Viral gastroenteritis?  Risk of C. diff low  No recent ABX use  Last hospitalization ~ a month ago  Zofran prn  clear diet  IV hydration

## 2020-10-10 NOTE — PROGRESS NOTE ADULT - ASSESSMENT
50 year old F w/  Hx of intercostal neuralgia, obesity, HTN, DM2 and recently dx'd colitis/ibs comes w/3 days of n/v/d, no signs of colitis on ct

## 2020-10-10 NOTE — H&P ADULT - ASSESSMENT
50 year old female w/pmhx of intercostal neuralgia, obesity, HTN, dm2 and recently dx'd colitis/ibs comes w/3 days of n/v/d ?gastroenteritis, no signs of colitis on ct

## 2020-10-10 NOTE — ED PROVIDER NOTE - PHYSICAL EXAMINATION
Gen: Alert, distressed, well appearing  Head: NC, AT, EOMI, normal lids/conjunctiva  ENT: normal hearing, patent oropharynx without erythema/exudate, uvula midline, dry mucus membranes  Neck: +supple, no tenderness, +Trachea midline  Pulm: Bilateral BS, normal resp effort, no wheeze/stridor/retractions  CV: tachycardic, no M/R/G, +dist pulses  Abd: soft, NT/ND, Negative Cook signs, +BS, no palpable masses  Mskel: no edema/erythema/cyanosis  Skin: no rash, warm/dry  Neuro: AAOx3, no apparent sensory/motor deficits, coordination intact

## 2020-10-10 NOTE — H&P ADULT - HISTORY OF PRESENT ILLNESS
Pt is a 50 year old female w/pmhx of intercostal neuralgia, obesity, HTN, dm2 and recently dx'd colitis/ibs comes w/3 days of n/v/d unable to keep much down, no unusual food.  pt denies any fever, chills, sob, cp, palpitations, no travels or sick contacts.

## 2020-10-11 DIAGNOSIS — K58.0 IRRITABLE BOWEL SYNDROME WITH DIARRHEA: ICD-10-CM

## 2020-10-11 LAB
A1C WITH ESTIMATED AVERAGE GLUCOSE RESULT: 7.4 % — HIGH (ref 4–5.6)
ANION GAP SERPL CALC-SCNC: 5 MMOL/L — SIGNIFICANT CHANGE UP (ref 5–17)
BUN SERPL-MCNC: 7 MG/DL — SIGNIFICANT CHANGE UP (ref 7–23)
CALCIUM SERPL-MCNC: 8.2 MG/DL — LOW (ref 8.5–10.1)
CHLORIDE SERPL-SCNC: 107 MMOL/L — SIGNIFICANT CHANGE UP (ref 96–108)
CO2 SERPL-SCNC: 27 MMOL/L — SIGNIFICANT CHANGE UP (ref 22–31)
CREAT SERPL-MCNC: 0.62 MG/DL — SIGNIFICANT CHANGE UP (ref 0.5–1.3)
ESTIMATED AVERAGE GLUCOSE: 166 MG/DL — HIGH (ref 68–114)
GLUCOSE BLDC GLUCOMTR-MCNC: 118 MG/DL — HIGH (ref 70–99)
GLUCOSE BLDC GLUCOMTR-MCNC: 123 MG/DL — HIGH (ref 70–99)
GLUCOSE BLDC GLUCOMTR-MCNC: 139 MG/DL — HIGH (ref 70–99)
GLUCOSE BLDC GLUCOMTR-MCNC: 91 MG/DL — SIGNIFICANT CHANGE UP (ref 70–99)
GLUCOSE SERPL-MCNC: 118 MG/DL — HIGH (ref 70–99)
HCT VFR BLD CALC: 34.8 % — SIGNIFICANT CHANGE UP (ref 34.5–45)
HGB BLD-MCNC: 11.4 G/DL — LOW (ref 11.5–15.5)
MAGNESIUM SERPL-MCNC: 2.2 MG/DL — SIGNIFICANT CHANGE UP (ref 1.6–2.6)
MCHC RBC-ENTMCNC: 30.2 PG — SIGNIFICANT CHANGE UP (ref 27–34)
MCHC RBC-ENTMCNC: 32.8 GM/DL — SIGNIFICANT CHANGE UP (ref 32–36)
MCV RBC AUTO: 92.3 FL — SIGNIFICANT CHANGE UP (ref 80–100)
NRBC # BLD: 0 /100 WBCS — SIGNIFICANT CHANGE UP (ref 0–0)
PHOSPHATE SERPL-MCNC: 3 MG/DL — SIGNIFICANT CHANGE UP (ref 2.5–4.5)
PLATELET # BLD AUTO: 366 K/UL — SIGNIFICANT CHANGE UP (ref 150–400)
POTASSIUM SERPL-MCNC: 3.7 MMOL/L — SIGNIFICANT CHANGE UP (ref 3.5–5.3)
POTASSIUM SERPL-SCNC: 3.7 MMOL/L — SIGNIFICANT CHANGE UP (ref 3.5–5.3)
RBC # BLD: 3.77 M/UL — LOW (ref 3.8–5.2)
RBC # FLD: 13.4 % — SIGNIFICANT CHANGE UP (ref 10.3–14.5)
SODIUM SERPL-SCNC: 139 MMOL/L — SIGNIFICANT CHANGE UP (ref 135–145)
WBC # BLD: 9.98 K/UL — SIGNIFICANT CHANGE UP (ref 3.8–10.5)
WBC # FLD AUTO: 9.98 K/UL — SIGNIFICANT CHANGE UP (ref 3.8–10.5)
WRIGHT STN STL: NEGATIVE — SIGNIFICANT CHANGE UP

## 2020-10-11 PROCEDURE — 99232 SBSQ HOSP IP/OBS MODERATE 35: CPT

## 2020-10-11 RX ORDER — SODIUM CHLORIDE 9 MG/ML
1000 INJECTION, SOLUTION INTRAVENOUS
Refills: 0 | Status: DISCONTINUED | OUTPATIENT
Start: 2020-10-11 | End: 2020-10-12

## 2020-10-11 RX ADMIN — Medication 10 MILLIGRAM(S): at 05:43

## 2020-10-11 RX ADMIN — LIDOCAINE 2 PATCH: 4 CREAM TOPICAL at 23:40

## 2020-10-11 RX ADMIN — Medication 150 MILLIGRAM(S): at 17:42

## 2020-10-11 RX ADMIN — Medication 6 UNIT(S): at 11:12

## 2020-10-11 RX ADMIN — SODIUM CHLORIDE 75 MILLILITER(S): 9 INJECTION, SOLUTION INTRAVENOUS at 11:19

## 2020-10-11 RX ADMIN — ONDANSETRON 4 MILLIGRAM(S): 8 TABLET, FILM COATED ORAL at 14:17

## 2020-10-11 RX ADMIN — SODIUM CHLORIDE 75 MILLILITER(S): 9 INJECTION, SOLUTION INTRAVENOUS at 23:31

## 2020-10-11 RX ADMIN — OXYCODONE AND ACETAMINOPHEN 1 TABLET(S): 5; 325 TABLET ORAL at 10:00

## 2020-10-11 RX ADMIN — OXYCODONE AND ACETAMINOPHEN 1 TABLET(S): 5; 325 TABLET ORAL at 15:46

## 2020-10-11 RX ADMIN — INSULIN GLARGINE 10 UNIT(S): 100 INJECTION, SOLUTION SUBCUTANEOUS at 21:32

## 2020-10-11 RX ADMIN — OXYCODONE AND ACETAMINOPHEN 1 TABLET(S): 5; 325 TABLET ORAL at 23:29

## 2020-10-11 RX ADMIN — LIDOCAINE 2 PATCH: 4 CREAM TOPICAL at 11:14

## 2020-10-11 RX ADMIN — LIDOCAINE 2 PATCH: 4 CREAM TOPICAL at 19:30

## 2020-10-11 RX ADMIN — Medication 150 MILLIGRAM(S): at 05:43

## 2020-10-11 RX ADMIN — ENOXAPARIN SODIUM 40 MILLIGRAM(S): 100 INJECTION SUBCUTANEOUS at 11:16

## 2020-10-11 RX ADMIN — PANTOPRAZOLE SODIUM 40 MILLIGRAM(S): 20 TABLET, DELAYED RELEASE ORAL at 05:43

## 2020-10-11 RX ADMIN — PANTOPRAZOLE SODIUM 40 MILLIGRAM(S): 20 TABLET, DELAYED RELEASE ORAL at 17:42

## 2020-10-11 RX ADMIN — OXYCODONE AND ACETAMINOPHEN 1 TABLET(S): 5; 325 TABLET ORAL at 09:27

## 2020-10-11 RX ADMIN — SERTRALINE 50 MILLIGRAM(S): 25 TABLET, FILM COATED ORAL at 12:14

## 2020-10-11 RX ADMIN — Medication 6 UNIT(S): at 07:50

## 2020-10-11 NOTE — PROGRESS NOTE ADULT - PROBLEM SELECTOR PLAN 4
R side, thought to originate from nerve impingements at T7 & T8  Continue Lyrica  Also uses Lidoderm -- 2 patchs; one at R T7, one at R T8

## 2020-10-11 NOTE — PROGRESS NOTE ADULT - SUBJECTIVE AND OBJECTIVE BOX
Patient is a 50y old  Female who presents with a chief complaint of n/v/diarrhea (10 Oct 2020 14:42)      INTERVAL HPI/OVERNIGHT EVENTS:    Feels somewhat better; no vomiting today; after drinking broth, was promptly followed by profuse watery diarrhea - no blood    REVIEW OF SYSTEMS:   Remaining ROS negative    MEDICATIONS  (STANDING):  dextrose 5%. 1000 milliLiter(s) (50 mL/Hr) IV Continuous <Continuous>  dextrose 50% Injectable 25 Gram(s) IV Push once  dextrose 50% Injectable 25 Gram(s) IV Push once  dextrose 50% Injectable 12.5 Gram(s) IV Push once  enalapril 10 milliGRAM(s) Oral daily  enoxaparin Injectable 40 milliGRAM(s) SubCutaneous daily  influenza   Vaccine 0.5 milliLiter(s) IntraMuscular once  insulin glargine Injectable (LANTUS) 10 Unit(s) SubCutaneous at bedtime  insulin lispro (HumaLOG) corrective regimen sliding scale   SubCutaneous three times a day before meals  insulin lispro Injectable (HumaLOG) 6 Unit(s) SubCutaneous three times a day before meals  lactated ringers. 1000 milliLiter(s) (125 mL/Hr) IV Continuous <Continuous>  lidocaine   Patch 2 Patch Transdermal daily  pantoprazole   Suspension 40 milliGRAM(s) Oral two times a day  pregabalin 150 milliGRAM(s) Oral two times a day  sertraline 50 milliGRAM(s) Oral daily    MEDICATIONS  (PRN):  acetaminophen   Tablet .. 650 milliGRAM(s) Oral every 6 hours PRN Temp greater or equal to 38C (100.4F), Mild Pain (1 - 3)  dextrose 40% Gel 15 Gram(s) Oral once PRN Blood Glucose LESS THAN 70 milliGRAM(s)/deciliter  glucagon  Injectable 1 milliGRAM(s) IntraMuscular once PRN Glucose LESS THAN 70 milligrams/deciliter  ondansetron Injectable 4 milliGRAM(s) IV Push every 6 hours PRN Nausea and/or Vomiting  oxycodone    5 mG/acetaminophen 325 mG 1 Tablet(s) Oral every 6 hours PRN Severe Pain (7 - 10)      Allergies    amitriptyline (Other)    Intolerances        Vital Signs Last 24 Hrs  T(C): 36.4 (11 Oct 2020 05:15), Max: 36.8 (10 Oct 2020 23:47)  T(F): 97.6 (11 Oct 2020 05:15), Max: 98.3 (10 Oct 2020 23:47)  HR: 60 (11 Oct 2020 05:15) (60 - 65)  BP: 132/64 (11 Oct 2020 05:15) (114/57 - 146/69)  BP(mean): --  RR: 17 (11 Oct 2020 05:15) (17 - 18)  SpO2: 96% (11 Oct 2020 05:15) (95% - 96%)    PHYSICAL EXAM:  GENERAL: NAD  HEAD:  Atraumatic, Normocephalic  EYES: EOMI, PERRLA, conjunctiva and sclera clear  ENT: O/P Clear  NECK: Supple, No JVD  NERVOUS SYSTEM:  No focal deficits  CHEST/LUNG: Clear to percussion bilaterally; No rales, rhonchi, wheezing  HEART: Regular rate and rhythm; No murmurs, rubs, or gallops  ABDOMEN: Soft, Nontender, Nondistended; Bowel sounds present  EXTREMITIES:  2+ Peripheral Pulses, No clubbing, cyanosis, or edema  SKIN: No rashes or lesions    LABS:                        11.4   9.98  )-----------( 366      ( 11 Oct 2020 06:22 )             34.8     10-11    139  |  107  |  7   ----------------------------<  118<H>  3.7   |  27  |  0.62    Ca    8.2<L>      11 Oct 2020 06:22  Phos  3.0     10-11  Mg     2.2     10-11    TPro  8.7<H>  /  Alb  3.7  /  TBili  0.7  /  DBili  x   /  AST  19  /  ALT  24  /  AlkPhos  165<H>  10-10        CAPILLARY BLOOD GLUCOSE      POCT Blood Glucose.: 118 mg/dL (11 Oct 2020 07:35)  POCT Blood Glucose.: 105 mg/dL (10 Oct 2020 21:24)  POCT Blood Glucose.: 131 mg/dL (10 Oct 2020 15:59)      RADIOLOGY & ADDITIONAL TESTS:    Imaging Personally Reviewed:  [ ] YES  [ ] NO    Consultant(s) Notes Reviewed:  [ ] YES  [ ] NO    Care Discussed with Consultants/Other Providers [ ] YES  [ ] NO

## 2020-10-11 NOTE — PROGRESS NOTE ADULT - PROBLEM SELECTOR PLAN 3
Normally takes Tresiba 80 units EVERY 3RD NIGHT -- last was 10/8  Also takes short-acting insulin, 12 units w/ meals, but only if BS > 200  Also takes Metformin 1000mg BID  While in-house, use 6 U w/ meals, + SSI; Decrease Lantus to 10u and adjust as needed

## 2020-10-11 NOTE — PROGRESS NOTE ADULT - PROBLEM SELECTOR PLAN 2
Viral gastroenteritis?  Risk of C. diff low  No recent ABX use  Last hospitalization ~ a month ago  Zofran prn  clear diet  IV hydration Viral gastroenteritis?  Risk of C. diff low  No recent ABX use  Last hospitalization ~ a month ago  Zofran prn  clear diet  IV hydration  Stool Cx and Fecal WBCs pending...

## 2020-10-11 NOTE — PROGRESS NOTE ADULT - PROBLEM SELECTOR PLAN 5
Normally takes Enalapril 10mg and Lasix at unknown dose; continue Enalapril  Restart Lasix at discharge

## 2020-10-12 ENCOUNTER — APPOINTMENT (OUTPATIENT)
Dept: INTERNAL MEDICINE | Facility: CLINIC | Age: 50
End: 2020-10-12

## 2020-10-12 LAB
ANION GAP SERPL CALC-SCNC: 5 MMOL/L — SIGNIFICANT CHANGE UP (ref 5–17)
BUN SERPL-MCNC: 6 MG/DL — LOW (ref 7–23)
CALCIUM SERPL-MCNC: 8.2 MG/DL — LOW (ref 8.5–10.1)
CHLORIDE SERPL-SCNC: 106 MMOL/L — SIGNIFICANT CHANGE UP (ref 96–108)
CO2 SERPL-SCNC: 28 MMOL/L — SIGNIFICANT CHANGE UP (ref 22–31)
CREAT SERPL-MCNC: 0.65 MG/DL — SIGNIFICANT CHANGE UP (ref 0.5–1.3)
GLUCOSE BLDC GLUCOMTR-MCNC: 105 MG/DL — HIGH (ref 70–99)
GLUCOSE BLDC GLUCOMTR-MCNC: 135 MG/DL — HIGH (ref 70–99)
GLUCOSE BLDC GLUCOMTR-MCNC: 169 MG/DL — HIGH (ref 70–99)
GLUCOSE BLDC GLUCOMTR-MCNC: 81 MG/DL — SIGNIFICANT CHANGE UP (ref 70–99)
GLUCOSE SERPL-MCNC: 101 MG/DL — HIGH (ref 70–99)
LDH SERPL L TO P-CCNC: 161 U/L — SIGNIFICANT CHANGE UP (ref 50–242)
MAGNESIUM SERPL-MCNC: 2.2 MG/DL — SIGNIFICANT CHANGE UP (ref 1.6–2.6)
POTASSIUM SERPL-MCNC: 3.6 MMOL/L — SIGNIFICANT CHANGE UP (ref 3.5–5.3)
POTASSIUM SERPL-SCNC: 3.6 MMOL/L — SIGNIFICANT CHANGE UP (ref 3.5–5.3)
SARS-COV-2 IGG SERPL QL IA: NEGATIVE — SIGNIFICANT CHANGE UP
SARS-COV-2 IGM SERPL IA-ACNC: 5.83 AU/ML — SIGNIFICANT CHANGE UP
SODIUM SERPL-SCNC: 139 MMOL/L — SIGNIFICANT CHANGE UP (ref 135–145)

## 2020-10-12 PROCEDURE — 99232 SBSQ HOSP IP/OBS MODERATE 35: CPT

## 2020-10-12 RX ORDER — CYCLOBENZAPRINE HYDROCHLORIDE 10 MG/1
10 TABLET, FILM COATED ORAL
Refills: 0 | Status: DISCONTINUED | OUTPATIENT
Start: 2020-10-12 | End: 2020-10-14

## 2020-10-12 RX ORDER — OXYCODONE HYDROCHLORIDE 5 MG/1
5 TABLET ORAL ONCE
Refills: 0 | Status: DISCONTINUED | OUTPATIENT
Start: 2020-10-12 | End: 2020-10-12

## 2020-10-12 RX ORDER — SODIUM CHLORIDE 9 MG/ML
1000 INJECTION, SOLUTION INTRAVENOUS
Refills: 0 | Status: DISCONTINUED | OUTPATIENT
Start: 2020-10-12 | End: 2020-10-14

## 2020-10-12 RX ORDER — PANTOPRAZOLE SODIUM 20 MG/1
40 TABLET, DELAYED RELEASE ORAL
Refills: 0 | Status: DISCONTINUED | OUTPATIENT
Start: 2020-10-12 | End: 2020-10-14

## 2020-10-12 RX ADMIN — LIDOCAINE 2 PATCH: 4 CREAM TOPICAL at 19:46

## 2020-10-12 RX ADMIN — OXYCODONE AND ACETAMINOPHEN 1 TABLET(S): 5; 325 TABLET ORAL at 23:01

## 2020-10-12 RX ADMIN — OXYCODONE HYDROCHLORIDE 5 MILLIGRAM(S): 5 TABLET ORAL at 23:32

## 2020-10-12 RX ADMIN — OXYCODONE AND ACETAMINOPHEN 1 TABLET(S): 5; 325 TABLET ORAL at 15:15

## 2020-10-12 RX ADMIN — PANTOPRAZOLE SODIUM 40 MILLIGRAM(S): 20 TABLET, DELAYED RELEASE ORAL at 06:00

## 2020-10-12 RX ADMIN — INSULIN GLARGINE 10 UNIT(S): 100 INJECTION, SOLUTION SUBCUTANEOUS at 22:01

## 2020-10-12 RX ADMIN — CYCLOBENZAPRINE HYDROCHLORIDE 10 MILLIGRAM(S): 10 TABLET, FILM COATED ORAL at 17:39

## 2020-10-12 RX ADMIN — Medication 10 MILLIGRAM(S): at 06:00

## 2020-10-12 RX ADMIN — OXYCODONE AND ACETAMINOPHEN 1 TABLET(S): 5; 325 TABLET ORAL at 06:00

## 2020-10-12 RX ADMIN — OXYCODONE AND ACETAMINOPHEN 1 TABLET(S): 5; 325 TABLET ORAL at 07:00

## 2020-10-12 RX ADMIN — Medication 150 MILLIGRAM(S): at 17:39

## 2020-10-12 RX ADMIN — OXYCODONE AND ACETAMINOPHEN 1 TABLET(S): 5; 325 TABLET ORAL at 15:56

## 2020-10-12 RX ADMIN — SODIUM CHLORIDE 50 MILLILITER(S): 9 INJECTION, SOLUTION INTRAVENOUS at 15:40

## 2020-10-12 RX ADMIN — ENOXAPARIN SODIUM 40 MILLIGRAM(S): 100 INJECTION SUBCUTANEOUS at 11:34

## 2020-10-12 RX ADMIN — OXYCODONE AND ACETAMINOPHEN 1 TABLET(S): 5; 325 TABLET ORAL at 22:00

## 2020-10-12 RX ADMIN — LIDOCAINE 2 PATCH: 4 CREAM TOPICAL at 11:35

## 2020-10-12 RX ADMIN — Medication 6 UNIT(S): at 11:35

## 2020-10-12 RX ADMIN — PANTOPRAZOLE SODIUM 40 MILLIGRAM(S): 20 TABLET, DELAYED RELEASE ORAL at 17:39

## 2020-10-12 RX ADMIN — OXYCODONE AND ACETAMINOPHEN 1 TABLET(S): 5; 325 TABLET ORAL at 00:30

## 2020-10-12 RX ADMIN — Medication 2: at 11:35

## 2020-10-12 RX ADMIN — Medication 150 MILLIGRAM(S): at 06:00

## 2020-10-12 RX ADMIN — LIDOCAINE 2 PATCH: 4 CREAM TOPICAL at 23:06

## 2020-10-12 RX ADMIN — SERTRALINE 50 MILLIGRAM(S): 25 TABLET, FILM COATED ORAL at 11:34

## 2020-10-12 NOTE — PHYSICAL THERAPY INITIAL EVALUATION ADULT - BALANCE TRAINING, PT EVAL
Pt will be independent in sitting, transfers, standing and ambulation with good balance using appropriate assistive device and prevent falls in 1 week.

## 2020-10-12 NOTE — PROGRESS NOTE ADULT - PROBLEM SELECTOR PLAN 2
Probably a viral gastroenteritis exacerbating her existing GI condition (IBS?); slowly improving  Risk of C. diff low  No recent ABX use  Last hospitalization ~ a month ago  Fecal WBCs negative; Stool Cx shows no pathogen growth to date  Zofran prn  clear diet; will try advancing to full liquid, lactose free  IV hydration - will taper down

## 2020-10-12 NOTE — PHYSICAL THERAPY INITIAL EVALUATION ADULT - ADDITIONAL COMMENTS
Lives in the basement of private house, 10 steps to enter. Pt is independent with ADLs and ambulates with rollator (also owns a cane). Pt's daughter is her home health aid 5 hrs/day, 5 days/per; stated to be also receiving home PT.

## 2020-10-12 NOTE — PHYSICAL THERAPY INITIAL EVALUATION ADULT - CRITERIA FOR SKILLED THERAPEUTIC INTERVENTIONS
risk reduction/prevention/rehab potential/therapy frequency/anticipated equipment needs at discharge/anticipated discharge recommendation/impairments found/functional limitations in following categories/predicted duration of therapy intervention

## 2020-10-12 NOTE — PHYSICAL THERAPY INITIAL EVALUATION ADULT - GAIT TRAINING, PT EVAL
Pt will independently ambulate 100 ft feet with [DEVICE] without loss of balance, by 1 week. Pt will independently ambulate 100 ft feet with rolling walker without loss of balance, by 1 week.

## 2020-10-12 NOTE — PHYSICAL THERAPY INITIAL EVALUATION ADULT - BED MOBILITY TRAINING, PT EVAL
Pt will independently perform all aspects of bed mobility to help prevent pressure ulcers, by discharge.

## 2020-10-12 NOTE — PHYSICAL THERAPY INITIAL EVALUATION ADULT - GAIT DEVIATIONS NOTED, PT EVAL
decreased weight-shifting ability/increased stride width/dragging R LE/decreased aric/decreased stride length

## 2020-10-12 NOTE — PROGRESS NOTE ADULT - SUBJECTIVE AND OBJECTIVE BOX
Patient is a 50y old  Female who presents with a chief complaint of n/v/diarrhea (11 Oct 2020 11:02)      INTERVAL HPI/OVERNIGHT EVENTS:    Overnight, still had watery diarrhea shortly after taking broth. Had more formed stool this AM. No N/V; upper abdominal pain resolved; pain now is only at the lower abdomen.    REVIEW OF SYSTEMS:   Remaining ROS negative    MEDICATIONS  (STANDING):  cyclobenzaprine 10 milliGRAM(s) Oral two times a day  dextrose 5%. 1000 milliLiter(s) (50 mL/Hr) IV Continuous <Continuous>  dextrose 50% Injectable 12.5 Gram(s) IV Push once  dextrose 50% Injectable 25 Gram(s) IV Push once  dextrose 50% Injectable 25 Gram(s) IV Push once  enalapril 10 milliGRAM(s) Oral daily  enoxaparin Injectable 40 milliGRAM(s) SubCutaneous daily  influenza   Vaccine 0.5 milliLiter(s) IntraMuscular once  insulin glargine Injectable (LANTUS) 10 Unit(s) SubCutaneous at bedtime  insulin lispro (HumaLOG) corrective regimen sliding scale   SubCutaneous three times a day before meals  insulin lispro Injectable (HumaLOG) 6 Unit(s) SubCutaneous three times a day before meals  lactated ringers. 1000 milliLiter(s) (75 mL/Hr) IV Continuous <Continuous>  lidocaine   Patch 2 Patch Transdermal daily  pantoprazole   Suspension 40 milliGRAM(s) Oral two times a day  pregabalin 150 milliGRAM(s) Oral two times a day  sertraline 50 milliGRAM(s) Oral daily    MEDICATIONS  (PRN):  acetaminophen   Tablet .. 650 milliGRAM(s) Oral every 6 hours PRN Temp greater or equal to 38C (100.4F), Mild Pain (1 - 3)  dextrose 40% Gel 15 Gram(s) Oral once PRN Blood Glucose LESS THAN 70 milliGRAM(s)/deciliter  glucagon  Injectable 1 milliGRAM(s) IntraMuscular once PRN Glucose LESS THAN 70 milligrams/deciliter  ondansetron Injectable 4 milliGRAM(s) IV Push every 6 hours PRN Nausea and/or Vomiting  oxycodone    5 mG/acetaminophen 325 mG 1 Tablet(s) Oral every 6 hours PRN Severe Pain (7 - 10)      Allergies    amitriptyline (Other)    Intolerances        Vital Signs Last 24 Hrs  T(C): 36.7 (12 Oct 2020 11:42), Max: 36.8 (11 Oct 2020 16:39)  T(F): 98 (12 Oct 2020 11:42), Max: 98.2 (11 Oct 2020 16:39)  HR: 50 (12 Oct 2020 14:50) (47 - 59)  BP: 145/66 (12 Oct 2020 14:50) (142/53 - 162/57)  BP(mean): --  RR: 18 (12 Oct 2020 14:50) (17 - 19)  SpO2: 97% (12 Oct 2020 14:50) (97% - 98%)    PHYSICAL EXAM:  GENERAL: NAD  HEAD:  Atraumatic, Normocephalic  EYES: EOMI, PERRLA, conjunctiva and sclera clear  ENT: O/P Clear  NECK: Supple, No JVD  NERVOUS SYSTEM:  No focal deficits  CHEST/LUNG: Clear to percussion bilaterally; No rales, rhonchi, wheezing  HEART: Regular rate and rhythm; No murmurs, rubs, or gallops  ABDOMEN: Soft, Nontender, ND; BS mildly hyperactive.   EXTREMITIES:  2+ Peripheral Pulses, No clubbing, cyanosis, or edema  SKIN: No rashes or lesions    LABS:                        11.4   9.98  )-----------( 366      ( 11 Oct 2020 06:22 )             34.8     10-12    139  |  106  |  6<L>  ----------------------------<  101<H>  3.6   |  28  |  0.65    Ca    8.2<L>      12 Oct 2020 07:33  Phos  3.0     10-11  Mg     2.2     10-12          CAPILLARY BLOOD GLUCOSE      POCT Blood Glucose.: 169 mg/dL (12 Oct 2020 10:37)  POCT Blood Glucose.: 105 mg/dL (12 Oct 2020 08:00)  POCT Blood Glucose.: 139 mg/dL (11 Oct 2020 21:03)  POCT Blood Glucose.: 91 mg/dL (11 Oct 2020 16:07)      RADIOLOGY & ADDITIONAL TESTS:    Imaging Personally Reviewed:  [ ] YES  [ ] NO    Consultant(s) Notes Reviewed:  [ ] YES  [ ] NO    Care Discussed with Consultants/Other Providers [ ] YES  [ ] NO

## 2020-10-12 NOTE — PHYSICAL THERAPY INITIAL EVALUATION ADULT - TRANSFER TRAINING, PT EVAL
Pt will independently perform sit to/from stand transfers without LOB using rolling walker by discharge.

## 2020-10-13 LAB
ANION GAP SERPL CALC-SCNC: 7 MMOL/L — SIGNIFICANT CHANGE UP (ref 5–17)
BUN SERPL-MCNC: 6 MG/DL — LOW (ref 7–23)
CALCIUM SERPL-MCNC: 8.6 MG/DL — SIGNIFICANT CHANGE UP (ref 8.5–10.1)
CHLORIDE SERPL-SCNC: 108 MMOL/L — SIGNIFICANT CHANGE UP (ref 96–108)
CO2 SERPL-SCNC: 29 MMOL/L — SIGNIFICANT CHANGE UP (ref 22–31)
CREAT SERPL-MCNC: 0.8 MG/DL — SIGNIFICANT CHANGE UP (ref 0.5–1.3)
CULTURE RESULTS: SIGNIFICANT CHANGE UP
GLUCOSE BLDC GLUCOMTR-MCNC: 105 MG/DL — HIGH (ref 70–99)
GLUCOSE BLDC GLUCOMTR-MCNC: 110 MG/DL — HIGH (ref 70–99)
GLUCOSE BLDC GLUCOMTR-MCNC: 128 MG/DL — HIGH (ref 70–99)
GLUCOSE BLDC GLUCOMTR-MCNC: 166 MG/DL — HIGH (ref 70–99)
GLUCOSE SERPL-MCNC: 108 MG/DL — HIGH (ref 70–99)
LACTATE SERPL-SCNC: 2 MMOL/L — SIGNIFICANT CHANGE UP (ref 0.7–2)
POTASSIUM SERPL-MCNC: 4 MMOL/L — SIGNIFICANT CHANGE UP (ref 3.5–5.3)
POTASSIUM SERPL-SCNC: 4 MMOL/L — SIGNIFICANT CHANGE UP (ref 3.5–5.3)
SODIUM SERPL-SCNC: 144 MMOL/L — SIGNIFICANT CHANGE UP (ref 135–145)
SPECIMEN SOURCE: SIGNIFICANT CHANGE UP

## 2020-10-13 PROCEDURE — 99239 HOSP IP/OBS DSCHRG MGMT >30: CPT

## 2020-10-13 RX ORDER — SIMETHICONE 80 MG/1
80 TABLET, CHEWABLE ORAL THREE TIMES A DAY
Refills: 0 | Status: DISCONTINUED | OUTPATIENT
Start: 2020-10-13 | End: 2020-10-14

## 2020-10-13 RX ADMIN — SIMETHICONE 80 MILLIGRAM(S): 80 TABLET, CHEWABLE ORAL at 12:07

## 2020-10-13 RX ADMIN — Medication 6 UNIT(S): at 08:01

## 2020-10-13 RX ADMIN — ENOXAPARIN SODIUM 40 MILLIGRAM(S): 100 INJECTION SUBCUTANEOUS at 11:21

## 2020-10-13 RX ADMIN — ONDANSETRON 4 MILLIGRAM(S): 8 TABLET, FILM COATED ORAL at 12:07

## 2020-10-13 RX ADMIN — OXYCODONE AND ACETAMINOPHEN 1 TABLET(S): 5; 325 TABLET ORAL at 05:44

## 2020-10-13 RX ADMIN — LIDOCAINE 2 PATCH: 4 CREAM TOPICAL at 11:21

## 2020-10-13 RX ADMIN — SODIUM CHLORIDE 50 MILLILITER(S): 9 INJECTION, SOLUTION INTRAVENOUS at 06:26

## 2020-10-13 RX ADMIN — PANTOPRAZOLE SODIUM 40 MILLIGRAM(S): 20 TABLET, DELAYED RELEASE ORAL at 05:44

## 2020-10-13 RX ADMIN — OXYCODONE HYDROCHLORIDE 5 MILLIGRAM(S): 5 TABLET ORAL at 03:51

## 2020-10-13 RX ADMIN — PANTOPRAZOLE SODIUM 40 MILLIGRAM(S): 20 TABLET, DELAYED RELEASE ORAL at 17:20

## 2020-10-13 RX ADMIN — Medication 6 UNIT(S): at 11:21

## 2020-10-13 RX ADMIN — INSULIN GLARGINE 10 UNIT(S): 100 INJECTION, SOLUTION SUBCUTANEOUS at 21:11

## 2020-10-13 RX ADMIN — Medication 150 MILLIGRAM(S): at 05:44

## 2020-10-13 RX ADMIN — Medication 10 MILLIGRAM(S): at 05:44

## 2020-10-13 RX ADMIN — CYCLOBENZAPRINE HYDROCHLORIDE 10 MILLIGRAM(S): 10 TABLET, FILM COATED ORAL at 05:44

## 2020-10-13 RX ADMIN — CYCLOBENZAPRINE HYDROCHLORIDE 10 MILLIGRAM(S): 10 TABLET, FILM COATED ORAL at 17:20

## 2020-10-13 RX ADMIN — Medication 150 MILLIGRAM(S): at 17:20

## 2020-10-13 RX ADMIN — SERTRALINE 50 MILLIGRAM(S): 25 TABLET, FILM COATED ORAL at 11:21

## 2020-10-13 RX ADMIN — Medication 6 UNIT(S): at 16:16

## 2020-10-13 NOTE — DIETITIAN INITIAL EVALUATION ADULT. - PERTINENT MEDS FT
MEDICATIONS  (STANDING):  cyclobenzaprine 10 milliGRAM(s) Oral two times a day  dextrose 5%. 1000 milliLiter(s) (50 mL/Hr) IV Continuous <Continuous>  dextrose 50% Injectable 12.5 Gram(s) IV Push once  dextrose 50% Injectable 25 Gram(s) IV Push once  dextrose 50% Injectable 25 Gram(s) IV Push once  enalapril 10 milliGRAM(s) Oral daily  enoxaparin Injectable 40 milliGRAM(s) SubCutaneous daily  influenza   Vaccine 0.5 milliLiter(s) IntraMuscular once  insulin glargine Injectable (LANTUS) 10 Unit(s) SubCutaneous at bedtime  insulin lispro (HumaLOG) corrective regimen sliding scale   SubCutaneous three times a day before meals  insulin lispro Injectable (HumaLOG) 6 Unit(s) SubCutaneous three times a day before meals  lactated ringers. 1000 milliLiter(s) (50 mL/Hr) IV Continuous <Continuous>  lidocaine   Patch 2 Patch Transdermal daily  pantoprazole    Tablet 40 milliGRAM(s) Oral two times a day  pregabalin 150 milliGRAM(s) Oral two times a day  sertraline 50 milliGRAM(s) Oral daily    MEDICATIONS  (PRN):  acetaminophen   Tablet .. 650 milliGRAM(s) Oral every 6 hours PRN Temp greater or equal to 38C (100.4F), Mild Pain (1 - 3)  dextrose 40% Gel 15 Gram(s) Oral once PRN Blood Glucose LESS THAN 70 milliGRAM(s)/deciliter  glucagon  Injectable 1 milliGRAM(s) IntraMuscular once PRN Glucose LESS THAN 70 milligrams/deciliter  ondansetron Injectable 4 milliGRAM(s) IV Push every 6 hours PRN Nausea and/or Vomiting  oxycodone    5 mG/acetaminophen 325 mG 1 Tablet(s) Oral every 6 hours PRN Severe Pain (7 - 10)  simethicone 80 milliGRAM(s) Chew three times a day PRN Gas

## 2020-10-13 NOTE — DIETITIAN INITIAL EVALUATION ADULT. - PHYSICAL APPEARANCE
obese/other (specify) nutrition focused physical exam deferred as pt in pain at time of visit. No visual signs of fat loss or muscle loss in the temples, orbitals, buccal region, clavicle, or shoulders. BMI: 43; No edema noted per flow sheets/other (specify)/obese

## 2020-10-13 NOTE — DIETITIAN INITIAL EVALUATION ADULT. - PERTINENT LABORATORY DATA
10-13 Na144 mmol/L Glu 108 mg/dL<H> K+ 4.0 mmol/L Cr  0.80 mg/dL BUN 6 mg/dL<L> 10-11 Phos 3.0 mg/dL 10-10 Alb 3.7 g/dL

## 2020-10-13 NOTE — CONSULT NOTE ADULT - ASSESSMENT
HPI:  Pt is a 50 year old female w/pmhx of intercostal neuralgia, obesity, HTN, dm2 and recently dx'd colitis/ibs comes w/3 days of n/v/d unable to keep much down, no unusual food.  pt denies any fever, chills, sob, cp, palpitations, no travels or sick contacts. (10 Oct 2020 06:36)  ----------------- As Above -----------------------------  The patient presented with N/V and diarrhea. Patient started having N/V with alternating constipation / diarrhea 7 months ago. A full work up was done and she diagnosed with IBS in July. Her symptoms are 4 days of constipation followed by days of large solid-->soft--> liquid stool. Then, after a few days, the cycle starts again. When she is terribly constipated, she develops N/V.   Last colonoscopy was in 2018 which revealed 3 polyps.   Greasy foods gives her gas but in general, she is very gassy  Patient feels significantly better        Alternating constipation / diarrhea, N/V - probably IBS- 1) Simethicone 2) Lactulose 3) Lactose free diet 4) low fiber diet

## 2020-10-13 NOTE — DIETITIAN INITIAL EVALUATION ADULT. - ENERGY NEEDS
Height (cm): 172.7 (10-10)  Weight (kg): 128.2 (10-10)  BMI (kg/m2): 43 (10-10)  IBW:  63.5kg +/- 10%  % IBW:   202%   UBW:    158.7kg   %UBW:   81%

## 2020-10-13 NOTE — CHART NOTE - TREATMENT: THE FOLLOWING DIET HAS BEEN RECOMMENDED
Diet, Consistent Carbohydrate/No Snacks:   Low Sodium  Supplement Feeding Modality:  Oral  Ensure Clear Cans or Servings Per Day:  1       Frequency:  Two Times a day (10-13-20 @ 13:22) [Pending Verification By Attending]  Diet, Consistent Carbohydrate w/Evening Snack:   No Dairy (10-13-20 @ 12:03) [Active]

## 2020-10-13 NOTE — CONSULT NOTE ADULT - SUBJECTIVE AND OBJECTIVE BOX
HPI:  Pt is a 50 year old female w/pmhx of intercostal neuralgia, obesity, HTN, dm2 and recently dx'd colitis/ibs comes w/3 days of n/v/d unable to keep much down, no unusual food.  pt denies any fever, chills, sob, cp, palpitations, no travels or sick contacts. (10 Oct 2020 06:36)  ----------------- As Above -----------------------------  The patient presented with N/V and diarrhea. Patient started having N/V with alternating constipation / diarrhea 7 months ago. A full work up was done and she diagnosed with IBS in July. Her symptoms are 4 days of constipation followed by days of large solid-->soft--> liquid stool. Then, after a few days, the cycle starts again. When she is terribly constipated, she develops N/V.   Last colonoscopy was in 2018 which revealed 3 polyps.   Greasy foods gives her gas but in general, she is very gassy  Patient feels significantly better      PAST MEDICAL & SURGICAL HISTORY:  IBS (irritable bowel syndrome)    Essential hypertension    Diabetes    No significant past surgical history        MEDICATIONS  (STANDING):  cyclobenzaprine 10 milliGRAM(s) Oral two times a day  dextrose 5%. 1000 milliLiter(s) (50 mL/Hr) IV Continuous <Continuous>  dextrose 50% Injectable 25 Gram(s) IV Push once  dextrose 50% Injectable 25 Gram(s) IV Push once  dextrose 50% Injectable 12.5 Gram(s) IV Push once  enalapril 10 milliGRAM(s) Oral daily  enoxaparin Injectable 40 milliGRAM(s) SubCutaneous daily  influenza   Vaccine 0.5 milliLiter(s) IntraMuscular once  insulin glargine Injectable (LANTUS) 10 Unit(s) SubCutaneous at bedtime  insulin lispro (HumaLOG) corrective regimen sliding scale   SubCutaneous three times a day before meals  insulin lispro Injectable (HumaLOG) 6 Unit(s) SubCutaneous three times a day before meals  lactated ringers. 1000 milliLiter(s) (50 mL/Hr) IV Continuous <Continuous>  lidocaine   Patch 2 Patch Transdermal daily  pantoprazole    Tablet 40 milliGRAM(s) Oral two times a day  pregabalin 150 milliGRAM(s) Oral two times a day  sertraline 50 milliGRAM(s) Oral daily    MEDICATIONS  (PRN):  acetaminophen   Tablet .. 650 milliGRAM(s) Oral every 6 hours PRN Temp greater or equal to 38C (100.4F), Mild Pain (1 - 3)  dextrose 40% Gel 15 Gram(s) Oral once PRN Blood Glucose LESS THAN 70 milliGRAM(s)/deciliter  glucagon  Injectable 1 milliGRAM(s) IntraMuscular once PRN Glucose LESS THAN 70 milligrams/deciliter  ondansetron Injectable 4 milliGRAM(s) IV Push every 6 hours PRN Nausea and/or Vomiting  oxycodone    5 mG/acetaminophen 325 mG 1 Tablet(s) Oral every 6 hours PRN Severe Pain (7 - 10)  simethicone 80 milliGRAM(s) Chew three times a day PRN Gas      Allergies    amitriptyline (Other)    Intolerances        FAMILY HISTORY:  No pertinent family history in first degree relatives        REVIEW OF SYSTEMS:    CONSTITUTIONAL: No fever, weight loss, or fatigue  EYES: No eye pain, visual disturbances, or discharge  ENMT:  No difficulty hearing, tinnitus, vertigo; No sinus or throat pain  NECK: No pain or stiffness  BREASTS: No pain, masses, or nipple discharge  RESPIRATORY: No cough, wheezing, chills or hemoptysis; No shortness of breath  CARDIOVASCULAR: No chest pain, palpitations, dizziness, or leg swelling  GASTROINTESTINAL:  See above  GENITOURINARY: No dysuria, frequency, hematuria, or incontinence  NEUROLOGICAL: No headaches, memory loss, loss of strength, numbness, or tremors  SKIN: No itching, burning, rashes, or lesions   LYMPH NODES: No enlarged glands  ENDOCRINE: No heat or cold intolerance; No hair loss  MUSCULOSKELETAL: No joint pain or swelling; No muscle, back, or extremity pain  PSYCHIATRIC: No depression, anxiety, mood swings, or difficulty sleeping  HEME/LYMPH: No easy bruising, or bleeding gums  ALLERGY AND IMMUNOLOGIC: No hives or eczema          SOCIAL HISTORY:    FAMILY HISTORY:  No pertinent family history in first degree relatives        Vital Signs Last 24 Hrs  T(C): 36.7 (13 Oct 2020 17:00), Max: 36.8 (12 Oct 2020 23:43)  T(F): 98 (13 Oct 2020 17:00), Max: 98.3 (12 Oct 2020 23:43)  HR: 57 (13 Oct 2020 17:00) (50 - 57)  BP: 109/65 (13 Oct 2020 17:00) (109/65 - 145/73)  BP(mean): --  RR: 18 (13 Oct 2020 17:00) (18 - 18)  SpO2: 95% (13 Oct 2020 17:00) (95% - 97%)    PHYSICAL EXAM:    GENERAL: NAD, well-groomed, well-developed  HEAD:  Atraumatic, Normocephalic  EYES: EOMI, PERRLA, conjunctiva and sclera clear  NECK: Supple, No JVD, Normal thyroid  NERVOUS SYSTEM:  Alert & Oriented X3, Good concentration; Motor Strength 5/5 B/L upper and lower extremities;   CHEST/LUNG: Clear to percussion bilaterally; No rales, rhonchi, wheezing, or rubs  HEART: Regular rate and rhythm; No murmurs, rubs, or gallops  ABDOMEN: Soft, Nontender, Nondistended; Bowel sounds present  EXTREMITIES:  2+ Peripheral Pulses, No clubbing, cyanosis, or edema  LYMPH: No lymphadenopathy noted   RECTAL: See above   SKIN: No rashes or lesions    LABS:      CBC:  10-11 @ 06:22  WBC  9.98  HGB 11.4  HCT 34.8 Plate 366  MCV 92.3  10-10 @ 01:56  WBC  16.88  HGB 14.7  HCT 45.3 Plate 525  MCV 91.1           13 Oct 2020 07:54    144    |  108    |  6      ----------------------------<  108    4.0     |  29     |  0.80   12 Oct 2020 07:33    139    |  106    |  6      ----------------------------<  101    3.6     |  28     |  0.65   11 Oct 2020 06:22    139    |  107    |  7      ----------------------------<  118    3.7     |  27     |  0.62   10 Oct 2020 01:56    140    |  105    |  12     ----------------------------<  178    3.2     |  26     |  0.90     Ca    8.6        13 Oct 2020 07:54  Ca    8.2        12 Oct 2020 07:33  Ca    8.2        11 Oct 2020 06:22  Ca    9.2        10 Oct 2020 01:56  Phos  3.0       11 Oct 2020 06:22  Mg     2.2       12 Oct 2020 07:33  Mg     2.2       11 Oct 2020 06:22    TPro  8.7    /  Alb  3.7    /  TBili  0.7    /  DBili  x      /  AST  19     /  ALT  24     /  AlkPhos  165    10 Oct 2020 01:56            RADIOLOGY & ADDITIONAL STUDIES:  < from: CT Abdomen and Pelvis w/ IV Cont (10.10.20 @ 03:12) >    EXAM:  CT ABDOMEN AND PELVIS IC                            PROCEDURE DATE:  10/10/2020          INTERPRETATION:  CLINICAL INFORMATION: 50 years old. Female. abdominal pain, vomiting, diarrhea.    COMPARISON: CT abdomen pelvis 7/10/2020    PROCEDURE:  CT of the Abdomen and Pelvis was performed with intravenous contrast.  Intravenous contrast: 90 ml Omnipaque 350. 10 ml discarded.  Oral contrast: None.  Sagittal and coronal reformats were performed.    FINDINGS:    LOWER CHEST: Mild bibasilar atelectasis.    LIVER: Hepatomegaly measuring 23 cm.  BILE DUCTS: Common bile duct measures 12 mm, unchanged, presumably related to post cholecystectomy state.  GALLBLADDER: Cholecystectomy.  SPLEEN: Within normal limits.  PANCREAS: Within normal limits.  ADRENALS: Within normal limits.  KIDNEYS/URETERS: Enhance symmetrically. No hydronephrosis.    BLADDER: Within normal limits.  REPRODUCTIVE ORGANS: IUD in the uterus.    BOWEL: No bowel obstruction. Appendix is unremarkable. Fluid throughout the small bowel and colon. No long segment bowel wall thickening.  PERITONEUM: No ascites.  VESSELS: Normal caliber abdominal aorta. Mild atherosclerosis.  RETROPERITONEUM/LYMPH NODES: No lymphadenopathy.  ABDOMINAL WALL: Trace fluid in a tiny fat-containingumbilical hernia.  BONES: Mild degenerative changes in the spine.    IMPRESSION:    Diarrhea. No CT evidence of colitis.                ASHLEY MOREAU MD., ATTENDING RADIOLOGIST  This document has been electronically signed. Oct 10 2020  3:29AM    < end of copied text >

## 2020-10-13 NOTE — PROGRESS NOTE ADULT - PROBLEM SELECTOR PLAN 2
Probably a viral gastroenteritis exacerbating her existing GI condition (IBS?); slowly improving  Risk of C. diff low  No recent ABX use  Last hospitalization ~ a month ago  Fecal WBCs negative; Stool Cx shows no pathogen growth to date  Zofran prn. pain and diarrhea improving.   tolerated fulls and will adbace to reg diet and reeval  get GI consult as she needs a new gi dr for follow up

## 2020-10-13 NOTE — DIETITIAN INITIAL EVALUATION ADULT. - OTHER INFO
Pt reports decreased appetite and PO intake x 10 months PTA due to stomach pain and persistent diarrhea. States she has been avoiding foods which cause her GI distress such as dairy PTA. States she has been able to tolerate leafy greens, mashed potatoes, chicken, fish, and eggs at home. Reports use of Multivitamin, Mg supplement, Gingko biloba PTA. Pt with T2DM states she takes Metformin, Victoza, and uses Tresiba and Novolog PTA. States she only checks her blood glucose levels sparingly as she has peripheral neuropathy and finger sticks hurt her; states ranges 80-100mg/dL. HbA1c 7.4% (10/11) indicates good blood glucose management.  Pt on full liquid diet; states she is unable to tolerate orange juice as it causes her acid reflux, reports she drank Ensure Clear x 1 (provides 240 kcal, 8 g protein), and ate jello and an ice this morning. Pt reports no difficulty chewing but reports swallowing difficulty sometimes; states she chokes on her liquids and solids "too often". Denies N/V. States her diarrhea is resolving; had a soft formed bowel movement this morning (10/13). Pt reports weight loss since January 2020 when her stomach issues began. Reports 100 pound weight loss; states  pounds 10 months ago. Weight loss as noted below.   Pt concerned about diet once she gets home. Discussed some options with emphasis on making sure she is consuming a sufficient amount of energy and protein. Recommended she follow up with outpatient dietitian; directory of ambulatory nutrition services left with pt. also given Meal Planning with the Plate Method nutrition therapy with handout.

## 2020-10-13 NOTE — PROGRESS NOTE ADULT - SUBJECTIVE AND OBJECTIVE BOX
Patient is a 50y old  Female who presents with a chief complaint of n/v/diarrhea (12 Oct 2020 15:24)      INTERVAL HPI/OVERNIGHT EVENTS: diarrhea and abd pain improving     MEDICATIONS  (STANDING):  cyclobenzaprine 10 milliGRAM(s) Oral two times a day  dextrose 5%. 1000 milliLiter(s) (50 mL/Hr) IV Continuous <Continuous>  dextrose 50% Injectable 12.5 Gram(s) IV Push once  dextrose 50% Injectable 25 Gram(s) IV Push once  dextrose 50% Injectable 25 Gram(s) IV Push once  enalapril 10 milliGRAM(s) Oral daily  enoxaparin Injectable 40 milliGRAM(s) SubCutaneous daily  influenza   Vaccine 0.5 milliLiter(s) IntraMuscular once  insulin glargine Injectable (LANTUS) 10 Unit(s) SubCutaneous at bedtime  insulin lispro (HumaLOG) corrective regimen sliding scale   SubCutaneous three times a day before meals  insulin lispro Injectable (HumaLOG) 6 Unit(s) SubCutaneous three times a day before meals  lactated ringers. 1000 milliLiter(s) (50 mL/Hr) IV Continuous <Continuous>  lidocaine   Patch 2 Patch Transdermal daily  pantoprazole    Tablet 40 milliGRAM(s) Oral two times a day  pregabalin 150 milliGRAM(s) Oral two times a day  sertraline 50 milliGRAM(s) Oral daily    MEDICATIONS  (PRN):  acetaminophen   Tablet .. 650 milliGRAM(s) Oral every 6 hours PRN Temp greater or equal to 38C (100.4F), Mild Pain (1 - 3)  dextrose 40% Gel 15 Gram(s) Oral once PRN Blood Glucose LESS THAN 70 milliGRAM(s)/deciliter  glucagon  Injectable 1 milliGRAM(s) IntraMuscular once PRN Glucose LESS THAN 70 milligrams/deciliter  ondansetron Injectable 4 milliGRAM(s) IV Push every 6 hours PRN Nausea and/or Vomiting  oxycodone    5 mG/acetaminophen 325 mG 1 Tablet(s) Oral every 6 hours PRN Severe Pain (7 - 10)  simethicone 80 milliGRAM(s) Chew three times a day PRN Gas      Allergies    amitriptyline (Other)    Intolerances        REVIEW OF SYSTEMS:  CONSTITUTIONAL: No fever, weight loss, or fatigue  EYES: No eye pain, visual disturbances, or discharge  ENMT:  No difficulty hearing, tinnitus, vertigo; No sinus or throat pain  NECK: No pain or stiffness  BREASTS: No pain, masses, or nipple discharge  RESPIRATORY: No cough, wheezing, chills or hemoptysis; No shortness of breath  CARDIOVASCULAR: No chest pain, palpitations, dizziness, or leg swelling  GASTROINTESTINAL: No abdominal or epigastric pain. No nausea, vomiting, or hematemesis; No diarrhea or constipation. No melena or hematochezia.  GENITOURINARY: No dysuria, frequency, hematuria, or incontinence  NEUROLOGICAL: No headaches, memory loss, loss of strength, numbness, or tremors  SKIN: No itching, burning, rashes, or lesions   LYMPH NODES: No enlarged glands  ENDOCRINE: No heat or cold intolerance; No hair loss  MUSCULOSKELETAL: No joint pain or swelling; No muscle, back, or extremity pain  PSYCHIATRIC: No depression, anxiety, mood swings, or difficulty sleeping  HEME/LYMPH: No easy bruising, or bleeding gums  ALLERGY AND IMMUNOLOGIC: No hives or eczema    Vital Signs Last 24 Hrs  T(C): 36.8 (13 Oct 2020 11:19), Max: 37.1 (12 Oct 2020 16:06)  T(F): 98.2 (13 Oct 2020 11:19), Max: 98.7 (12 Oct 2020 16:06)  HR: 50 (13 Oct 2020 11:19) (50 - 57)  BP: 145/73 (13 Oct 2020 11:19) (130/64 - 154/73)  BP(mean): --  RR: 18 (13 Oct 2020 11:19) (18 - 18)  SpO2: 95% (13 Oct 2020 11:19) (95% - 100%)    PHYSICAL EXAM:  GENERAL: NAD, well-groomed, well-developed  HEAD:  Atraumatic, Normocephalic  EYES: EOMI, PERRLA, conjunctiva and sclera clear  ENMT: No tonsillar erythema, exudates, or enlargement; Moist mucous membranes, Good dentition, No lesions  NECK: Supple, No JVD, Normal thyroid  NERVOUS SYSTEM:  Alert & Oriented X3, Good concentration; Motor Strength 5/5 B/L upper and lower extremities; DTRs 2+ intact and symmetric  CHEST/LUNG: Clear to percussion bilaterally; No rales, rhonchi, wheezing, or rubs  HEART: Regular rate and rhythm; No murmurs, rubs, or gallops  ABDOMEN: Soft, Nontender, Nondistended; Bowel sounds present  EXTREMITIES:  2+ Peripheral Pulses, No clubbing, cyanosis, or edema  LYMPH: No lymphadenopathy noted  SKIN: No rashes or lesions    LABS:    10-13    144  |  108  |  6<L>  ----------------------------<  108<H>  4.0   |  29  |  0.80    Ca    8.6      13 Oct 2020 07:54  Mg     2.2     10-12                  CAPILLARY BLOOD GLUCOSE      POCT Blood Glucose.: 110 mg/dL (13 Oct 2020 11:12)  POCT Blood Glucose.: 105 mg/dL (13 Oct 2020 07:41)  POCT Blood Glucose.: 135 mg/dL (12 Oct 2020 21:30)  POCT Blood Glucose.: 81 mg/dL (12 Oct 2020 15:53)      RADIOLOGY & ADDITIONAL TESTS:    Imaging Personally Reviewed:  [ ] YES  [ ] NO    Consultant(s) Notes Reviewed:  [ ] YES  [ ] NO    Care Discussed with Consultants/Other Providers [ ] YES  [ ] NO

## 2020-10-14 ENCOUNTER — TRANSCRIPTION ENCOUNTER (OUTPATIENT)
Age: 50
End: 2020-10-14

## 2020-10-14 VITALS
HEART RATE: 53 BPM | SYSTOLIC BLOOD PRESSURE: 156 MMHG | RESPIRATION RATE: 18 BRPM | TEMPERATURE: 98 F | OXYGEN SATURATION: 95 % | DIASTOLIC BLOOD PRESSURE: 63 MMHG

## 2020-10-14 LAB
GLUCOSE BLDC GLUCOMTR-MCNC: 129 MG/DL — HIGH (ref 70–99)
GLUCOSE BLDC GLUCOMTR-MCNC: 145 MG/DL — HIGH (ref 70–99)
GLUCOSE BLDC GLUCOMTR-MCNC: 190 MG/DL — HIGH (ref 70–99)

## 2020-10-14 PROCEDURE — 99239 HOSP IP/OBS DSCHRG MGMT >30: CPT

## 2020-10-14 RX ORDER — SIMETHICONE 80 MG/1
1 TABLET, CHEWABLE ORAL
Qty: 30 | Refills: 0
Start: 2020-10-14 | End: 2020-11-12

## 2020-10-14 RX ORDER — LACTULOSE 10 G/15ML
10 SOLUTION ORAL
Refills: 0 | Status: DISCONTINUED | OUTPATIENT
Start: 2020-10-14 | End: 2020-10-14

## 2020-10-14 RX ORDER — LACTULOSE 10 G/15ML
15 SOLUTION ORAL
Qty: 500 | Refills: 0
Start: 2020-10-14

## 2020-10-14 RX ORDER — SIMETHICONE 80 MG/1
80 TABLET, CHEWABLE ORAL
Refills: 0 | Status: DISCONTINUED | OUTPATIENT
Start: 2020-10-14 | End: 2020-10-14

## 2020-10-14 RX ORDER — IBUPROFEN 200 MG
1 TABLET ORAL
Qty: 30 | Refills: 0
Start: 2020-10-14

## 2020-10-14 RX ORDER — ACETAMINOPHEN 500 MG
2 TABLET ORAL
Qty: 0 | Refills: 0 | DISCHARGE
Start: 2020-10-14

## 2020-10-14 RX ADMIN — Medication 150 MILLIGRAM(S): at 17:46

## 2020-10-14 RX ADMIN — SIMETHICONE 80 MILLIGRAM(S): 80 TABLET, CHEWABLE ORAL at 13:39

## 2020-10-14 RX ADMIN — SIMETHICONE 80 MILLIGRAM(S): 80 TABLET, CHEWABLE ORAL at 17:51

## 2020-10-14 RX ADMIN — Medication 6 UNIT(S): at 08:00

## 2020-10-14 RX ADMIN — Medication 6 UNIT(S): at 11:37

## 2020-10-14 RX ADMIN — PANTOPRAZOLE SODIUM 40 MILLIGRAM(S): 20 TABLET, DELAYED RELEASE ORAL at 05:05

## 2020-10-14 RX ADMIN — OXYCODONE AND ACETAMINOPHEN 1 TABLET(S): 5; 325 TABLET ORAL at 02:08

## 2020-10-14 RX ADMIN — CYCLOBENZAPRINE HYDROCHLORIDE 10 MILLIGRAM(S): 10 TABLET, FILM COATED ORAL at 05:05

## 2020-10-14 RX ADMIN — OXYCODONE AND ACETAMINOPHEN 1 TABLET(S): 5; 325 TABLET ORAL at 09:32

## 2020-10-14 RX ADMIN — Medication 150 MILLIGRAM(S): at 05:05

## 2020-10-14 RX ADMIN — Medication 10 MILLIGRAM(S): at 05:05

## 2020-10-14 RX ADMIN — PANTOPRAZOLE SODIUM 40 MILLIGRAM(S): 20 TABLET, DELAYED RELEASE ORAL at 17:44

## 2020-10-14 RX ADMIN — ENOXAPARIN SODIUM 40 MILLIGRAM(S): 100 INJECTION SUBCUTANEOUS at 11:38

## 2020-10-14 RX ADMIN — OXYCODONE AND ACETAMINOPHEN 1 TABLET(S): 5; 325 TABLET ORAL at 01:36

## 2020-10-14 RX ADMIN — SIMETHICONE 80 MILLIGRAM(S): 80 TABLET, CHEWABLE ORAL at 00:22

## 2020-10-14 RX ADMIN — Medication 2: at 16:14

## 2020-10-14 RX ADMIN — CYCLOBENZAPRINE HYDROCHLORIDE 10 MILLIGRAM(S): 10 TABLET, FILM COATED ORAL at 17:44

## 2020-10-14 RX ADMIN — Medication 6 UNIT(S): at 16:14

## 2020-10-14 RX ADMIN — LIDOCAINE 2 PATCH: 4 CREAM TOPICAL at 11:38

## 2020-10-14 RX ADMIN — SERTRALINE 50 MILLIGRAM(S): 25 TABLET, FILM COATED ORAL at 11:39

## 2020-10-14 RX ADMIN — OXYCODONE AND ACETAMINOPHEN 1 TABLET(S): 5; 325 TABLET ORAL at 10:15

## 2020-10-14 NOTE — PROGRESS NOTE ADULT - NUTRITIONAL ASSESSMENT
This patient has been assessed with a concern for Malnutrition and has been determined to have a diagnosis/diagnoses of Severe protein-calorie malnutrition and Morbid obesity/BMI > 40.    This patient is being managed with:   Diet Consistent Carbohydrate/No Snacks-  Low Sodium  Supplement Feeding Modality:  Oral  Ensure Clear Cans or Servings Per Day:  1       Frequency:  Two Times a day  Entered: Oct 13 2020  1:22PM

## 2020-10-14 NOTE — PROGRESS NOTE ADULT - SUBJECTIVE AND OBJECTIVE BOX
Patient is a 50y old  Female who presents with a chief complaint of n/v/diarrhea (13 Oct 2020 21:17)      HPI:  Pt is a 50 year old female w/pmhx of intercostal neuralgia, obesity, HTN, dm2 and recently dx'd colitis/ibs comes w/3 days of n/v/d unable to keep much down, no unusual food.  pt denies any fever, chills, sob, cp, palpitations, no travels or sick contacts. (10 Oct 2020 06:36)      INTERVAL HPI/OVERNIGHT EVENTS:  Patient doing well. Happy with her GI tract. Tolerating solid diet    MEDICATIONS  (STANDING):  cyclobenzaprine 10 milliGRAM(s) Oral two times a day  dextrose 5%. 1000 milliLiter(s) (50 mL/Hr) IV Continuous <Continuous>  dextrose 50% Injectable 12.5 Gram(s) IV Push once  dextrose 50% Injectable 25 Gram(s) IV Push once  dextrose 50% Injectable 25 Gram(s) IV Push once  enalapril 10 milliGRAM(s) Oral daily  enoxaparin Injectable 40 milliGRAM(s) SubCutaneous daily  influenza   Vaccine 0.5 milliLiter(s) IntraMuscular once  insulin glargine Injectable (LANTUS) 10 Unit(s) SubCutaneous at bedtime  insulin lispro (HumaLOG) corrective regimen sliding scale   SubCutaneous three times a day before meals  insulin lispro Injectable (HumaLOG) 6 Unit(s) SubCutaneous three times a day before meals  lactated ringers. 1000 milliLiter(s) (50 mL/Hr) IV Continuous <Continuous>  lactulose Syrup 10 Gram(s) Oral two times a day  lidocaine   Patch 2 Patch Transdermal daily  pantoprazole    Tablet 40 milliGRAM(s) Oral two times a day  pregabalin 150 milliGRAM(s) Oral two times a day  sertraline 50 milliGRAM(s) Oral daily  simethicone 80 milliGRAM(s) Chew <User Schedule>    MEDICATIONS  (PRN):  acetaminophen   Tablet .. 650 milliGRAM(s) Oral every 6 hours PRN Temp greater or equal to 38C (100.4F), Mild Pain (1 - 3)  dextrose 40% Gel 15 Gram(s) Oral once PRN Blood Glucose LESS THAN 70 milliGRAM(s)/deciliter  glucagon  Injectable 1 milliGRAM(s) IntraMuscular once PRN Glucose LESS THAN 70 milligrams/deciliter  ondansetron Injectable 4 milliGRAM(s) IV Push every 6 hours PRN Nausea and/or Vomiting  oxycodone    5 mG/acetaminophen 325 mG 1 Tablet(s) Oral every 6 hours PRN Severe Pain (7 - 10)      FAMILY HISTORY:  No pertinent family history in first degree relatives        Allergies    amitriptyline (Other)    Intolerances        PMH/PSH:  IBS (irritable bowel syndrome)    Essential hypertension    Diabetes    No significant past surgical history          REVIEW OF SYSTEMS:  CONSTITUTIONAL: No fever, weight loss, or fatigue  EYES: No eye pain, visual disturbances, or discharge  ENMT:  No difficulty hearing, tinnitus, vertigo; No sinus or throat pain  NECK: No pain or stiffness  BREASTS: No pain, masses, or nipple discharge  RESPIRATORY: No cough, wheezing, chills or hemoptysis; No shortness of breath  CARDIOVASCULAR: No chest pain, palpitations, dizziness, or leg swelling  GASTROINTESTINAL: See above  GENITOURINARY: No dysuria, frequency, hematuria, or incontinence  NEUROLOGICAL: No headaches,  numbness, or tremors  SKIN: No itching, burning, rashes, or lesions   LYMPH NODES: No enlarged glands  ENDOCRINE: No heat or cold intolerance; No hair loss  MUSCULOSKELETAL: No joint pain or swelling; No muscle, back, or extremity pain  PSYCHIATRIC: No depression, anxiety, mood swings, or difficulty sleeping  HEME/LYMPH: No easy bruising, or bleeding gums  ALLERGY AND IMMUNOLOGIC: No hives or eczema    Vital Signs Last 24 Hrs  T(C): 36.6 (14 Oct 2020 06:01), Max: 36.8 (13 Oct 2020 11:19)  T(F): 97.9 (14 Oct 2020 06:01), Max: 98.2 (13 Oct 2020 11:19)  HR: 54 (14 Oct 2020 06:01) (50 - 63)  BP: 118/59 (14 Oct 2020 06:01) (109/65 - 145/73)  BP(mean): --  RR: 18 (14 Oct 2020 06:01) (18 - 18)  SpO2: 94% (14 Oct 2020 06:01) (94% - 95%)    PHYSICAL EXAM:  GENERAL: NAD, well-groomed, well-developed  HEAD:  Atraumatic, Normocephalic  EYES: EOMI, PERRLA, conjunctiva and sclera clear  NECK: Supple, No JVD, Normal thyroid  NERVOUS SYSTEM:  Alert & Oriented X3, Good concentration; Motor Strength 5/5 B/L upper and lower extremities;   CHEST/LUNG: Clear to percussion bilaterally; No rales, rhonchi, wheezing, or rubs  HEART: Regular rate and rhythm; No murmurs, rubs, or gallops  ABDOMEN: Soft, Nontender, Nondistended; Bowel sounds present  EXTREMITIES:  2+ Peripheral Pulses, No clubbing, cyanosis, or edema  LYMPH: No lymphadenopathy noted  SKIN: No rashes or lesions    LAB  10-10 @ 01:56  amylase 55   lipase 193         CBC:  10-11 @ 06:22  WBC 9.98   Hgb 11.4   Hct 34.8   Plts 366  MCV 92.3  10-10 @ 01:56  WBC 16.88   Hgb 14.7   Hct 45.3   Plts 525  MCV 91.1      Chemistry:  10-13 @ 07:54  Na+ 144  K+ 4.0  Cl- 108  CO2 29  BUN 6  Cr 0.80     10-12 @ 07:33  Na+ 139  K+ 3.6  Cl- 106  CO2 28  BUN 6  Cr 0.65     10-11 @ 06:22  Na+ 139  K+ 3.7  Cl- 107  CO2 27  BUN 7  Cr 0.62     10-10 @ 01:56  Na+ 140  K+ 3.2  Cl- 105  CO2 26  BUN 12  Cr 0.90         Glucose, Serum: 108 mg/dL (10-13 @ 07:54)  Glucose, Serum: 101 mg/dL (10-12 @ 07:33)  Glucose, Serum: 118 mg/dL (10-11 @ 06:22)  Glucose, Serum: 178 mg/dL (10-10 @ 01:56)      13 Oct 2020 07:54    144    |  108    |  6      ----------------------------<  108    4.0     |  29     |  0.80   12 Oct 2020 07:33    139    |  106    |  6      ----------------------------<  101    3.6     |  28     |  0.65   11 Oct 2020 06:22    139    |  107    |  7      ----------------------------<  118    3.7     |  27     |  0.62   10 Oct 2020 01:56    140    |  105    |  12     ----------------------------<  178    3.2     |  26     |  0.90     Ca    8.6        13 Oct 2020 07:54  Ca    8.2        12 Oct 2020 07:33  Ca    8.2        11 Oct 2020 06:22  Ca    9.2        10 Oct 2020 01:56  Phos  3.0       11 Oct 2020 06:22  Mg     2.2       12 Oct 2020 07:33  Mg     2.2       11 Oct 2020 06:22    TPro  8.7    /  Alb  3.7    /  TBili  0.7    /  DBili  x      /  AST  19     /  ALT  24     /  AlkPhos  165    10 Oct 2020 01:56              CAPILLARY BLOOD GLUCOSE      POCT Blood Glucose.: 129 mg/dL (14 Oct 2020 07:41)  POCT Blood Glucose.: 166 mg/dL (13 Oct 2020 20:21)  POCT Blood Glucose.: 128 mg/dL (13 Oct 2020 15:42)  POCT Blood Glucose.: 110 mg/dL (13 Oct 2020 11:12)          RADIOLOGY & ADDITIONAL TESTS:    Imaging Personally Reviewed:  [ ] YES  [ ] NO    Consultant(s) Notes Reviewed:  [ ] YES  [ ] NO    Care Discussed with Consultants/Other Providers [ ] YES  [ ] NO

## 2020-10-14 NOTE — DISCHARGE NOTE NURSING/CASE MANAGEMENT/SOCIAL WORK - PATIENT PORTAL LINK FT
You can access the FollowMyHealth Patient Portal offered by [facility] by registering at the following website: [email or web address]. By joining [facility]’s FollowMyHealth portal, you will also be able to view your health information using other applications (apps) compatible with our system.

## 2020-10-14 NOTE — PROGRESS NOTE ADULT - ASSESSMENT
HPI:  Pt is a 50 year old female w/pmhx of intercostal neuralgia, obesity, HTN, dm2 and recently dx'd colitis/ibs comes w/3 days of n/v/d unable to keep much down, no unusual food.  pt denies any fever, chills, sob, cp, palpitations, no travels or sick contacts. (10 Oct 2020 06:36)  ----------------- As Above -----------------------------  The patient presented with N/V and diarrhea. Patient started having N/V with alternating constipation / diarrhea 7 months ago. A full work up was done and she diagnosed with IBS in July. Her symptoms are 4 days of constipation followed by days of large solid-->soft--> liquid stool. Then, after a few days, the cycle starts again. When she is terribly constipated, she develops N/V.   Last colonoscopy was in 2018 which revealed 3 polyps.   Greasy foods gives her gas but in general, she is very gassy  Patient feels significantly better        Alternating constipation / diarrhea, N/V - probably IBS- 1) Simethicone 2) Lactulose 3) Lactose free diet 4) low fiber diet  === patient doing well. reviewed Dx / TX with patient.  Nutrition consult

## 2020-10-14 NOTE — DISCHARGE NOTE PROVIDER - HOSPITAL COURSE
50 year old F w/ history of intercostal neuralgia, obesity, HTN, DM2, intercostal neuralgia and IBS p/w nausea vomiting & diarrhea. Pt was admitted for viral gastroenteritis exacerbating her IBS.     50 year old F w/ history of intercostal neuralgia, obesity, HTN, DM2, intercostal neuralgia and IBS p/w nausea vomiting & diarrhea. Pt was admitted for viral gastroenteritis exacerbating her IBS. Her symptoms resolved and she was cleared for discharge by GI.    Discharge time: 43 minutes

## 2020-10-14 NOTE — DISCHARGE NOTE PROVIDER - NSDCMRMEDTOKEN_GEN_ALL_CORE_FT
enalapril 10 mg oral tablet: 1 tab(s) orally once a day  Flexeril 10 mg oral tablet: 1 tab(s) orally 2 times a day  insulin aspart 100 units/mL injectable solution: 12 unit(s) injectable 3 times a day (before meals), As Needed for BS &gt; 200  Lasix: tab(s) orally once a day  lidocaine 5% topical film: Apply topically to affected area once a day; R paraspinal T7 and T8  loperamide 2 mg oral capsule: 1 cap(s) orally 2 times a day  metFORMIN 1000 mg oral tablet: 1 tab(s) orally 2 times a day  oxycodone-acetaminophen 5 mg-325 mg oral tablet: 2 tab(s) orally every 6 hours MDD:eight tablets  pantoprazole 40 mg oral delayed release tablet: 1 tab(s) orally 2 times a day  pregabalin 150 mg oral capsule: 1 cap(s) orally 2 times a day MDD:2 caps  simethicone 80 mg oral tablet, chewable: 1 tab(s) orally 3 times a day, As needed, Gas  Tresiba 100 units/mL subcutaneous solution: 80 unit(s) subcutaneous every 72 hours  Zoloft 50 mg oral tablet: 1 tab(s) orally once a day   acetaminophen 325 mg oral tablet: 2 tab(s) orally every 6 hours, As needed, Temp greater or equal to 38C (100.4F), Mild Pain (1 - 3)  enalapril 10 mg oral tablet: 1 tab(s) orally once a day  Flexeril 10 mg oral tablet: 1 tab(s) orally 2 times a day  ibuprofen 400 mg oral tablet: 1 tab(s) orally every 6 hours, As Needed for severe pain - take with food  insulin aspart 100 units/mL injectable solution: 12 unit(s) injectable 3 times a day (before meals), As Needed for BS &gt; 200  lactulose 10 g/15 mL oral syrup: 15 milliliter(s) orally 2 times a day, As Needed -for constipation   Lasix: tab(s) orally once a day  lidocaine 5% topical film: Apply topically to affected area once a day; R paraspinal T7 and T8  metFORMIN 1000 mg oral tablet: 1 tab(s) orally 2 times a day  pantoprazole 40 mg oral delayed release tablet: 1 tab(s) orally 2 times a day  pregabalin 150 mg oral capsule: 1 cap(s) orally 2 times a day MDD:2 caps  simethicone 80 mg oral tablet, chewable: 1 tab(s) orally 3 times a day after meals and at bedtime as needed for Gas  Tresiba 100 units/mL subcutaneous solution: 80 unit(s) subcutaneous every 72 hours  Zoloft 50 mg oral tablet: 1 tab(s) orally once a day

## 2020-10-14 NOTE — DISCHARGE NOTE PROVIDER - NSDCCPCAREPLAN_GEN_ALL_CORE_FT
PRINCIPAL DISCHARGE DIAGNOSIS  Diagnosis: Gastritis  Assessment and Plan of Treatment:       SECONDARY DISCHARGE DIAGNOSES  Diagnosis: Vomiting and diarrhea  Assessment and Plan of Treatment:

## 2020-10-15 RX ORDER — LACTULOSE 10 G/15ML
15 SOLUTION ORAL
Qty: 500 | Refills: 0
Start: 2020-10-15

## 2020-10-19 DIAGNOSIS — I10 ESSENTIAL (PRIMARY) HYPERTENSION: ICD-10-CM

## 2020-10-19 DIAGNOSIS — E11.42 TYPE 2 DIABETES MELLITUS WITH DIABETIC POLYNEUROPATHY: ICD-10-CM

## 2020-10-19 DIAGNOSIS — M79.2 NEURALGIA AND NEURITIS, UNSPECIFIED: ICD-10-CM

## 2020-10-19 DIAGNOSIS — F12.99 CANNABIS USE, UNSPECIFIED WITH UNSPECIFIED CANNABIS-INDUCED DISORDER: ICD-10-CM

## 2020-10-19 DIAGNOSIS — K29.70 GASTRITIS, UNSPECIFIED, WITHOUT BLEEDING: ICD-10-CM

## 2020-10-19 DIAGNOSIS — E66.01 MORBID (SEVERE) OBESITY DUE TO EXCESS CALORIES: ICD-10-CM

## 2020-10-19 DIAGNOSIS — K58.0 IRRITABLE BOWEL SYNDROME WITH DIARRHEA: ICD-10-CM

## 2020-10-19 DIAGNOSIS — K50.90 CROHN'S DISEASE, UNSPECIFIED, WITHOUT COMPLICATIONS: ICD-10-CM

## 2020-10-19 DIAGNOSIS — A08.4 VIRAL INTESTINAL INFECTION, UNSPECIFIED: ICD-10-CM

## 2020-10-19 DIAGNOSIS — E11.65 TYPE 2 DIABETES MELLITUS WITH HYPERGLYCEMIA: ICD-10-CM

## 2020-10-19 DIAGNOSIS — Z79.4 LONG TERM (CURRENT) USE OF INSULIN: ICD-10-CM

## 2020-10-23 PROBLEM — E11.9 TYPE 2 DIABETES MELLITUS WITHOUT COMPLICATIONS: Chronic | Status: ACTIVE | Noted: 2020-10-10

## 2020-10-23 PROBLEM — K58.9 IRRITABLE BOWEL SYNDROME WITHOUT DIARRHEA: Chronic | Status: ACTIVE | Noted: 2020-10-10

## 2020-10-23 PROBLEM — K58.9 IRRITABLE BOWEL SYNDROME, UNSPECIFIED: Chronic | Status: ACTIVE | Noted: 2020-10-10

## 2020-10-23 PROBLEM — I10 ESSENTIAL (PRIMARY) HYPERTENSION: Chronic | Status: ACTIVE | Noted: 2020-10-10

## 2020-10-27 ENCOUNTER — OUTPATIENT (OUTPATIENT)
Dept: OUTPATIENT SERVICES | Facility: HOSPITAL | Age: 50
LOS: 1 days | End: 2020-10-27

## 2020-10-27 ENCOUNTER — APPOINTMENT (OUTPATIENT)
Dept: INTERNAL MEDICINE | Facility: CLINIC | Age: 50
End: 2020-10-27
Payer: MEDICARE

## 2020-10-27 VITALS
OXYGEN SATURATION: 96 % | HEART RATE: 67 BPM | DIASTOLIC BLOOD PRESSURE: 62 MMHG | WEIGHT: 293 LBS | BODY MASS INDEX: 44.41 KG/M2 | HEIGHT: 68 IN | SYSTOLIC BLOOD PRESSURE: 110 MMHG

## 2020-10-27 VITALS — TEMPERATURE: 96.5 F

## 2020-10-27 DIAGNOSIS — Z98.890 OTHER SPECIFIED POSTPROCEDURAL STATES: Chronic | ICD-10-CM

## 2020-10-27 DIAGNOSIS — Z90.49 ACQUIRED ABSENCE OF OTHER SPECIFIED PARTS OF DIGESTIVE TRACT: Chronic | ICD-10-CM

## 2020-10-27 DIAGNOSIS — B97.7 PAPILLOMAVIRUS AS THE CAUSE OF DISEASES CLASSIFIED ELSEWHERE: ICD-10-CM

## 2020-10-27 DIAGNOSIS — N63.10 UNSPECIFIED LUMP IN THE RIGHT BREAST, UNSPECIFIED QUADRANT: ICD-10-CM

## 2020-10-27 PROBLEM — Z87.891 PERSONAL HISTORY OF NICOTINE DEPENDENCE: Chronic | Status: ACTIVE | Noted: 2020-09-17

## 2020-10-27 PROBLEM — E66.9 OBESITY, UNSPECIFIED: Chronic | Status: ACTIVE | Noted: 2020-09-17

## 2020-10-27 PROBLEM — I10 ESSENTIAL (PRIMARY) HYPERTENSION: Chronic | Status: ACTIVE | Noted: 2020-09-17

## 2020-10-27 PROBLEM — G62.9 POLYNEUROPATHY, UNSPECIFIED: Chronic | Status: ACTIVE | Noted: 2020-09-17

## 2020-10-27 PROBLEM — F12.90 CANNABIS USE, UNSPECIFIED, UNCOMPLICATED: Chronic | Status: ACTIVE | Noted: 2020-09-17

## 2020-10-27 PROBLEM — M79.2 NEURALGIA AND NEURITIS, UNSPECIFIED: Chronic | Status: ACTIVE | Noted: 2020-09-17

## 2020-10-27 PROCEDURE — 99204 OFFICE O/P NEW MOD 45 MIN: CPT | Mod: GC

## 2020-10-27 NOTE — PHYSICAL EXAM
[No Acute Distress] : no acute distress [Well Nourished] : well nourished [Normal Sclera/Conjunctiva] : normal sclera/conjunctiva [PERRL] : pupils equal round and reactive to light [EOMI] : extraocular movements intact [No JVD] : no jugular venous distention [No Respiratory Distress] : no respiratory distress  [Clear to Auscultation] : lungs were clear to auscultation bilaterally [Normal Rate] : normal rate  [Regular Rhythm] : with a regular rhythm [Normal S1, S2] : normal S1 and S2 [Pedal Pulses Present] : the pedal pulses are present [No Edema] : there was no peripheral edema [Soft] : abdomen soft [Non Tender] : non-tender [Non-distended] : non-distended [Normal Bowel Sounds] : normal bowel sounds [No Rash] : no rash [No Focal Deficits] : no focal deficits [Normal Affect] : the affect was normal [Normal Insight/Judgement] : insight and judgment were intact

## 2020-11-02 DIAGNOSIS — E11.49 TYPE 2 DIABETES MELLITUS WITH OTHER DIABETIC NEUROLOGICAL COMPLICATION: ICD-10-CM

## 2020-11-02 DIAGNOSIS — E66.01 MORBID (SEVERE) OBESITY DUE TO EXCESS CALORIES: ICD-10-CM

## 2020-11-02 DIAGNOSIS — I10 ESSENTIAL (PRIMARY) HYPERTENSION: ICD-10-CM

## 2020-11-02 DIAGNOSIS — J45.909 UNSPECIFIED ASTHMA, UNCOMPLICATED: ICD-10-CM

## 2020-11-02 DIAGNOSIS — G58.8 OTHER SPECIFIED MONONEUROPATHIES: ICD-10-CM

## 2020-11-02 DIAGNOSIS — F31.81 BIPOLAR II DISORDER: ICD-10-CM

## 2020-11-02 DIAGNOSIS — R42 DIZZINESS AND GIDDINESS: ICD-10-CM

## 2020-11-02 DIAGNOSIS — K21.9 GASTRO-ESOPHAGEAL REFLUX DISEASE WITHOUT ESOPHAGITIS: ICD-10-CM

## 2020-11-02 DIAGNOSIS — Z80.3 FAMILY HISTORY OF MALIGNANT NEOPLASM OF BREAST: ICD-10-CM

## 2020-11-02 DIAGNOSIS — B97.7 PAPILLOMAVIRUS AS THE CAUSE OF DISEASES CLASSIFIED ELSEWHERE: ICD-10-CM

## 2020-11-02 DIAGNOSIS — N63.10 UNSPECIFIED LUMP IN THE RIGHT BREAST, UNSPECIFIED QUADRANT: ICD-10-CM

## 2020-11-02 NOTE — REVIEW OF SYSTEMS
[Diarrhea] : diarrhea [Insomnia] : insomnia [Anxiety] : anxiety [Depression] : depression [Fever] : no fever [Chills] : no chills [Fatigue] : no fatigue [Night Sweats] : no night sweats [Vision Problems] : no vision problems [Hoarseness] : no hoarseness [Nasal Discharge] : no nasal discharge [Sore Throat] : no sore throat [Postnasal Drip] : no postnasal drip [Chest Pain] : no chest pain [Palpitations] : no palpitations [Leg Claudication] : no leg claudication [Lower Ext Edema] : no lower extremity edema [Shortness Of Breath] : no shortness of breath [Wheezing] : no wheezing [Cough] : no cough [Abdominal Pain] : no abdominal pain [Nausea] : no nausea [Constipation] : no constipation [Vomiting] : no vomiting [Heartburn] : no heartburn [Melena] : no melena [Dysuria] : no dysuria [Hematuria] : no hematuria [Joint Pain] : no joint pain [Skin Rash] : no skin rash [Headache] : no headache [Dizziness] : no dizziness [Suicidal] : not suicidal

## 2020-11-02 NOTE — HISTORY OF PRESENT ILLNESS
[FreeTextEntry1] : Post Hospitalization to establish care.  [de-identified] : Ms. Linares is a 50 year-old female with asthma, HTN, T2DM, GERD, obesity, intercostal neuralgia, bipolar disorder 2, vertigo, and recently diagnosed IBS (7/20) presenting after recent hospitalization at [place] from 10/10-10/14 for viral gastroenteritis with exacerbation of IBS. She previously followed with a PCP in the [place] but has since moved. \par \par #IBS\par -Recently diagnosed in July 2020 during hospitalization for similar sx \par -Pt seen by GI in the hospital. Recommended simethicone prn, lactulose 15mL BID, and lactose-free, low fiber diet\par -CT AP (10/10/20): diarrhea w/o evidence of colitis \par -11/17 Dr. Leone gastroenterology appointment scheduled \par -Patient states that she's been doing well since hospital discharge. She's been mindful of her diet, eating primarily baby food, jello, and Ensure clear. \par -Persistent diarrhea since discharge, taking immodium AD symptomatically. However, she says stools are slowly becoming more formed. Denies n/v, melena, hematochezia. \par \par #HTN\par -BP in office 110/62 today\par -Takes enalapril 10mg qday + lasix 40mg qday - Needs refill of lasix. Had leg swelling previously and had discussed getting an echo with previous PCP. \par -Patient checks her BP every day, usually her BP is around 130/70. \par -Previous meds: HCTZ\par \par #T2DM with associated peripheral neuropathy \par -HbA1c 7.4 during recent hospitalization (10/20)\par -Takes 12U novolog PRN pre-meal + Metformin 1000mg BID + Tresiba 80U q72H + Victoza 1.8mg qday - Needs metformin refill \par \par #Obesity\par -BMI 45\par -Patient states that she's lost ~200 pounds in the past 10 years intentionally with diet changes \par -Works with PT for back pains twice a week and walked 30-45 mins 3-4 times a week \par \par #Asthma \par -Ventolin 2-4 puffs q6H + and singulair qHS - Needs singulair refill \par -Requesting a new nebulizer because her old nebulizer was damaged in the move \par -Patient states that she does not follow with a pulmonologist \par \par #Intercostal Neuralgia \par -Right paraspinal T7 & T8 \par -Lidocaine patch + flexeril 10mg BID + pregabalin 150mg BID + tramadol 50mg qday \par -Upcoming pain management appointment 11/6/20 \par \par #GERD\par -Pantoprazole 40mg BID\par \par #Type 2 Bipolar disorder/Depression\par -Zoloft 50mg qday - requesting a new prescription today \par -Trazadone 50mg PRN for insomnia \par -Patient requesting a psychiatry referral today \par -Denies SI but states that 5-6 months ago she had cut herself, but denies thoughts of wanting to hurt herself now \par \par #Vertigo\par -Patient was previously following with a neurologist, who has now re-located\par -Patient is requesting to establish with a new Neurologist\par -Patient states that she intermittently experiences vertigo symptoms, but has run out of her meclizine \par \par #HCM\par -Vaccinations: Will get flu shot today. Pneumo 23 in 2019. Last Tdap 2015. \par -Mammogram: Last mammogram in March. Right breast lesion, gets mammograms every 6 months for monitoring. Needs referral today. \par -Pap smear: Last in 2/20, HPV positive. Was referred to GYN but couldn't go due to COVID. Requesting re-referral today. \par -Colonoscopy: 2017 colonoscopy, 3 polyps removed. Repeat due in 2022. \par -HIV/HCV: Tested in March at previous PCP, negative.

## 2020-11-02 NOTE — PLAN
[FreeTextEntry1] : Ms. Linares is a 50 year-old female with asthma, HTN, T2DM, GERD, obesity, intercostal neuralgia, bipolar disorder 2, vertigo, and recently diagnosed IBS (7/20) presenting after recent hospitalization at [place] from 10/10-10/14 for viral gastroenteritis with exacerbation of IBS. She previously followed with a PCP in the [place] but has since moved. \par \par #IBS\par -Continue with imodium PRN, simethicone prn, lactulose 15mL BID, and lactose-free, low fiber diet\par -F/u with scheduled GI appt \par \par #HTN\par -Well controlled \par -Continue with enalapril 10mg qday + lasix 40mg qday\par -Consider TTE and cards referral in future if re develops LE edema \par \par #T2DM with associated peripheral neuropathy \par -Continue with 12U novolog PRN pre-meal + Metformin 1000mg BID + Tresiba 80U q72H + Victoza 1.8mg qday\par -Diabetic educator referral \par -Discuss diabetic eye/foot exam at next visit \par \par #Obesity\par -Educated on good diet/regular exercise \par \par #Asthma \par -Continue with Ventolin 2-4 puffs q6H + singulair 10mg qHS\par -Sent rx for nebulizer + albuterol \par \par #Intercostal Neuralgia \par -Continue with Lidocaine patch + flexeril 10mg PRN + pregabalin 150mg BID + tramadol 50mg qday \par -F/u with pain management (scheduled 11/6/20) \par \par #GERD\par -Continue with Pantoprazole 40mg BID\par \par #Type 2 Bipolar disorder/Depression\par -Continue with Zoloft 50mg qday \par -Continue with Trazadone 50mg PRN for insomnia \par -Psychiatric referral sent today \par \par #Vertigo\par -Neurology referral sent \par -Continue with meclizine \par \par #HCM\par -Vaccinations: Will get flu shot today. Pneumo 23 in 2019. Last Tdap 2015. \par -Mammogram: Last mammogram in March. Right breast lesion, gets mammograms every 6 months for monitoring. Diagnostic [facility] referral sent today. \par -Pap smear: Last in 2/20, HPV positive. GYN referral sent today .\par -Colonoscopy: 2017 colonoscopy, 3 polyps removed. Repeat due in 2022. \par -HIV/HCV: Tested in March at previous PCP, negative. Will need to repeat at a later visit. \par \par RTC in 3 months. \par \par Case discussed with Dr. Espino. \par \par Jess Barnett, PGY-1\par Internal Medicine\par [facility] [place]\par [employer] [facility]

## 2020-11-06 ENCOUNTER — APPOINTMENT (OUTPATIENT)
Dept: GASTROENTEROLOGY | Facility: CLINIC | Age: 50
End: 2020-11-06
Payer: MEDICARE

## 2020-11-06 VITALS
TEMPERATURE: 98.7 F | HEIGHT: 68 IN | DIASTOLIC BLOOD PRESSURE: 75 MMHG | OXYGEN SATURATION: 95 % | HEART RATE: 75 BPM | BODY MASS INDEX: 41.22 KG/M2 | WEIGHT: 272 LBS | SYSTOLIC BLOOD PRESSURE: 120 MMHG

## 2020-11-06 PROCEDURE — 99203 OFFICE O/P NEW LOW 30 MIN: CPT

## 2020-11-06 PROCEDURE — 99072 ADDL SUPL MATRL&STAF TM PHE: CPT

## 2020-11-08 NOTE — PHYSICAL EXAM
[General Appearance - Alert] : alert [General Appearance - In No Acute Distress] : in no acute distress [Sclera] : the sclera and conjunctiva were normal [PERRL With Normal Accommodation] : pupils were equal in size, round, and reactive to light [Extraocular Movements] : extraocular movements were intact [Outer Ear] : the ears and nose were normal in appearance [Oropharynx] : the oropharynx was normal [Neck Appearance] : the appearance of the neck was normal [Neck Cervical Mass (___cm)] : no neck mass was observed [Jugular Venous Distention Increased] : there was no jugular-venous distention [Thyroid Diffuse Enlargement] : the thyroid was not enlarged [Thyroid Nodule] : there were no palpable thyroid nodules [] : no respiratory distress [Auscultation Breath Sounds / Voice Sounds] : lungs were clear to auscultation bilaterally [Full Pulse] : the pedal pulses are present [Edema] : there was no peripheral edema [Abdomen Soft] : soft [Abdomen Tenderness] : non-tender [FreeTextEntry1] : A female chaperone was present during my exam.

## 2020-11-08 NOTE — HISTORY OF PRESENT ILLNESS
[de-identified] : Patient is 50 years old with diarrhea, nausea, vomiting per her history recently hospitalized. Her BMI is 45. She states she has cramps and "twisting" abdominal pain. She had colonoscopy in 2018. She had 2 polyps supposedly removed. No EGD was done. Her gallbladder was removed in 2016. She's only had symptoms recently.

## 2020-11-08 NOTE — ASSESSMENT
[FreeTextEntry1] : This patient supposedly nausea, vomiting, diarrhea. She does not seem to be in much discomfort. My plan will be\par Cholestyramine b.i.d.\par \par Dicyclomine Q. a.c. and q.h.s.\par Stool studies if needed

## 2020-11-17 ENCOUNTER — APPOINTMENT (OUTPATIENT)
Dept: PAIN MANAGEMENT | Facility: CLINIC | Age: 50
End: 2020-11-17

## 2020-11-20 ENCOUNTER — APPOINTMENT (OUTPATIENT)
Dept: INTERNAL MEDICINE | Facility: CLINIC | Age: 50
End: 2020-11-20

## 2020-12-02 ENCOUNTER — RX RENEWAL (OUTPATIENT)
Age: 50
End: 2020-12-02

## 2021-01-17 ENCOUNTER — EMERGENCY (EMERGENCY)
Facility: HOSPITAL | Age: 51
LOS: 0 days | Discharge: HOME | End: 2021-01-17
Attending: STUDENT IN AN ORGANIZED HEALTH CARE EDUCATION/TRAINING PROGRAM
Payer: MEDICARE

## 2021-01-17 VITALS
DIASTOLIC BLOOD PRESSURE: 92 MMHG | OXYGEN SATURATION: 99 % | HEIGHT: 68 IN | TEMPERATURE: 98 F | WEIGHT: 272.05 LBS | RESPIRATION RATE: 17 BRPM | SYSTOLIC BLOOD PRESSURE: 137 MMHG | HEART RATE: 85 BPM

## 2021-01-17 VITALS
DIASTOLIC BLOOD PRESSURE: 83 MMHG | TEMPERATURE: 98 F | RESPIRATION RATE: 18 BRPM | HEART RATE: 76 BPM | SYSTOLIC BLOOD PRESSURE: 144 MMHG | OXYGEN SATURATION: 96 %

## 2021-01-17 DIAGNOSIS — Z98.890 OTHER SPECIFIED POSTPROCEDURAL STATES: Chronic | ICD-10-CM

## 2021-01-17 DIAGNOSIS — F12.99 CANNABIS USE, UNSPECIFIED WITH UNSPECIFIED CANNABIS-INDUCED DISORDER: ICD-10-CM

## 2021-01-17 DIAGNOSIS — M25.511 PAIN IN RIGHT SHOULDER: ICD-10-CM

## 2021-01-17 DIAGNOSIS — U07.1 COVID-19: ICD-10-CM

## 2021-01-17 DIAGNOSIS — Z88.1 ALLERGY STATUS TO OTHER ANTIBIOTIC AGENTS STATUS: ICD-10-CM
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
## 2021-01-17 DIAGNOSIS — I10 ESSENTIAL (PRIMARY) HYPERTENSION: ICD-10-CM

## 2021-01-17 DIAGNOSIS — Z87.891 PERSONAL HISTORY OF NICOTINE DEPENDENCE: ICD-10-CM

## 2021-01-17 DIAGNOSIS — F43.20 ADJUSTMENT DISORDER, UNSPECIFIED: ICD-10-CM

## 2021-01-17 DIAGNOSIS — E66.9 OBESITY, UNSPECIFIED: ICD-10-CM
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
## 2021-01-17 DIAGNOSIS — Z90.49 ACQUIRED ABSENCE OF OTHER SPECIFIED PARTS OF DIGESTIVE TRACT: Chronic | ICD-10-CM

## 2021-01-17 DIAGNOSIS — E11.9 TYPE 2 DIABETES MELLITUS WITHOUT COMPLICATIONS: ICD-10-CM

## 2021-01-17 DIAGNOSIS — M25.561 PAIN IN RIGHT KNEE: ICD-10-CM

## 2021-01-17 DIAGNOSIS — M79.2 NEURALGIA AND NEURITIS, UNSPECIFIED: ICD-10-CM

## 2021-01-17 DIAGNOSIS — E78.5 HYPERLIPIDEMIA, UNSPECIFIED: ICD-10-CM

## 2021-01-17 DIAGNOSIS — Z79.899 OTHER LONG TERM (CURRENT) DRUG THERAPY: ICD-10-CM

## 2021-01-17 DIAGNOSIS — K58.9 IRRITABLE BOWEL SYNDROME WITHOUT DIARRHEA: ICD-10-CM

## 2021-01-17 DIAGNOSIS — Z79.4 LONG TERM (CURRENT) USE OF INSULIN: ICD-10-CM

## 2021-01-17 LAB
ALBUMIN SERPL ELPH-MCNC: 3.1 G/DL — LOW (ref 3.3–5)
ALP SERPL-CCNC: 140 U/L — HIGH (ref 40–120)
ALT FLD-CCNC: 26 U/L — SIGNIFICANT CHANGE UP (ref 12–78)
AMPHET UR-MCNC: NEGATIVE — SIGNIFICANT CHANGE UP
ANION GAP SERPL CALC-SCNC: 9 MMOL/L — SIGNIFICANT CHANGE UP (ref 5–17)
APAP SERPL-MCNC: < 2 UG/ML (ref 10–30)
APPEARANCE UR: CLEAR — SIGNIFICANT CHANGE UP
AST SERPL-CCNC: 21 U/L — SIGNIFICANT CHANGE UP (ref 15–37)
BACTERIA # UR AUTO: ABNORMAL
BARBITURATES UR SCN-MCNC: NEGATIVE — SIGNIFICANT CHANGE UP
BASOPHILS # BLD AUTO: 0.03 K/UL — SIGNIFICANT CHANGE UP (ref 0–0.2)
BASOPHILS NFR BLD AUTO: 0.3 % — SIGNIFICANT CHANGE UP (ref 0–2)
BENZODIAZ UR-MCNC: NEGATIVE — SIGNIFICANT CHANGE UP
BILIRUB SERPL-MCNC: 0.3 MG/DL — SIGNIFICANT CHANGE UP (ref 0.2–1.2)
BILIRUB UR-MCNC: NEGATIVE — SIGNIFICANT CHANGE UP
BUN SERPL-MCNC: 14 MG/DL — SIGNIFICANT CHANGE UP (ref 7–23)
CALCIUM SERPL-MCNC: 8.1 MG/DL — LOW (ref 8.5–10.1)
CHLORIDE SERPL-SCNC: 104 MMOL/L — SIGNIFICANT CHANGE UP (ref 96–108)
CO2 SERPL-SCNC: 28 MMOL/L — SIGNIFICANT CHANGE UP (ref 22–31)
COCAINE METAB.OTHER UR-MCNC: NEGATIVE — SIGNIFICANT CHANGE UP
COLOR SPEC: YELLOW — SIGNIFICANT CHANGE UP
CREAT SERPL-MCNC: 0.8 MG/DL — SIGNIFICANT CHANGE UP (ref 0.5–1.3)
DIFF PNL FLD: ABNORMAL
EOSINOPHIL # BLD AUTO: 0.2 K/UL — SIGNIFICANT CHANGE UP (ref 0–0.5)
EOSINOPHIL NFR BLD AUTO: 2.1 % — SIGNIFICANT CHANGE UP (ref 0–6)
EPI CELLS # UR: SIGNIFICANT CHANGE UP
ETHANOL SERPL-MCNC: <10 MG/DL — SIGNIFICANT CHANGE UP (ref 0–10)
GLUCOSE BLDC GLUCOMTR-MCNC: 91 MG/DL — SIGNIFICANT CHANGE UP (ref 70–99)
GLUCOSE SERPL-MCNC: 84 MG/DL — SIGNIFICANT CHANGE UP (ref 70–99)
GLUCOSE UR QL: NEGATIVE MG/DL — SIGNIFICANT CHANGE UP
HCG SERPL-ACNC: <1 MIU/ML — SIGNIFICANT CHANGE UP
HCT VFR BLD CALC: 39.4 % — SIGNIFICANT CHANGE UP (ref 34.5–45)
HGB BLD-MCNC: 12.7 G/DL — SIGNIFICANT CHANGE UP (ref 11.5–15.5)
IMM GRANULOCYTES NFR BLD AUTO: 0.2 % — SIGNIFICANT CHANGE UP (ref 0–1.5)
KETONES UR-MCNC: NEGATIVE — SIGNIFICANT CHANGE UP
LEUKOCYTE ESTERASE UR-ACNC: ABNORMAL
LYMPHOCYTES # BLD AUTO: 4.04 K/UL — HIGH (ref 1–3.3)
LYMPHOCYTES # BLD AUTO: 41.7 % — SIGNIFICANT CHANGE UP (ref 13–44)
MCHC RBC-ENTMCNC: 29.7 PG — SIGNIFICANT CHANGE UP (ref 27–34)
MCHC RBC-ENTMCNC: 32.2 GM/DL — SIGNIFICANT CHANGE UP (ref 32–36)
MCV RBC AUTO: 92.3 FL — SIGNIFICANT CHANGE UP (ref 80–100)
METHADONE UR-MCNC: NEGATIVE — SIGNIFICANT CHANGE UP
MONOCYTES # BLD AUTO: 0.63 K/UL — SIGNIFICANT CHANGE UP (ref 0–0.9)
MONOCYTES NFR BLD AUTO: 6.5 % — SIGNIFICANT CHANGE UP (ref 2–14)
NEUTROPHILS # BLD AUTO: 4.76 K/UL — SIGNIFICANT CHANGE UP (ref 1.8–7.4)
NEUTROPHILS NFR BLD AUTO: 49.2 % — SIGNIFICANT CHANGE UP (ref 43–77)
NITRITE UR-MCNC: POSITIVE
NRBC # BLD: 0 /100 WBCS — SIGNIFICANT CHANGE UP (ref 0–0)
OPIATES UR-MCNC: NEGATIVE — SIGNIFICANT CHANGE UP
PCP SPEC-MCNC: SIGNIFICANT CHANGE UP
PCP UR-MCNC: NEGATIVE — SIGNIFICANT CHANGE UP
PH UR: 5 — SIGNIFICANT CHANGE UP (ref 5–8)
PLATELET # BLD AUTO: 352 K/UL — SIGNIFICANT CHANGE UP (ref 150–400)
POTASSIUM SERPL-MCNC: 3.1 MMOL/L — LOW (ref 3.5–5.3)
POTASSIUM SERPL-SCNC: 3.1 MMOL/L — LOW (ref 3.5–5.3)
PROT SERPL-MCNC: 7.7 GM/DL — SIGNIFICANT CHANGE UP (ref 6–8.3)
PROT UR-MCNC: 15 MG/DL
RAPID RVP RESULT: DETECTED
RBC # BLD: 4.27 M/UL — SIGNIFICANT CHANGE UP (ref 3.8–5.2)
RBC # FLD: 13.2 % — SIGNIFICANT CHANGE UP (ref 10.3–14.5)
RBC CASTS # UR COMP ASSIST: ABNORMAL /HPF (ref 0–4)
SALICYLATES SERPL-MCNC: 3.9 MG/DL — SIGNIFICANT CHANGE UP (ref 2.8–20)
SARS-COV-2 RNA SPEC QL NAA+PROBE: DETECTED
SODIUM SERPL-SCNC: 141 MMOL/L — SIGNIFICANT CHANGE UP (ref 135–145)
SP GR SPEC: 1.01 — SIGNIFICANT CHANGE UP (ref 1.01–1.02)
THC UR QL: POSITIVE — SIGNIFICANT CHANGE UP
TSH SERPL-MCNC: 1.16 UIU/ML — SIGNIFICANT CHANGE UP (ref 0.36–3.74)
UROBILINOGEN FLD QL: NEGATIVE MG/DL — SIGNIFICANT CHANGE UP
WBC # BLD: 9.68 K/UL — SIGNIFICANT CHANGE UP (ref 3.8–10.5)
WBC # FLD AUTO: 9.68 K/UL — SIGNIFICANT CHANGE UP (ref 3.8–10.5)
WBC UR QL: ABNORMAL

## 2021-01-17 PROCEDURE — 73562 X-RAY EXAM OF KNEE 3: CPT | Mod: 26,RT

## 2021-01-17 PROCEDURE — 99285 EMERGENCY DEPT VISIT HI MDM: CPT

## 2021-01-17 PROCEDURE — 93010 ELECTROCARDIOGRAM REPORT: CPT

## 2021-01-17 PROCEDURE — 90792 PSYCH DIAG EVAL W/MED SRVCS: CPT | Mod: 95

## 2021-01-17 PROCEDURE — 73030 X-RAY EXAM OF SHOULDER: CPT | Mod: 26,RT

## 2021-01-17 PROCEDURE — 72100 X-RAY EXAM L-S SPINE 2/3 VWS: CPT | Mod: 26

## 2021-01-17 PROCEDURE — 71046 X-RAY EXAM CHEST 2 VIEWS: CPT | Mod: 26

## 2021-01-17 RX ORDER — ACETAMINOPHEN 500 MG
650 TABLET ORAL ONCE
Refills: 0 | Status: COMPLETED | OUTPATIENT
Start: 2021-01-17 | End: 2021-01-17

## 2021-01-17 RX ORDER — OXYCODONE AND ACETAMINOPHEN 5; 325 MG/1; MG/1
1 TABLET ORAL ONCE
Refills: 0 | Status: DISCONTINUED | OUTPATIENT
Start: 2021-01-17 | End: 2021-01-17

## 2021-01-17 RX ORDER — IBUPROFEN 200 MG
800 TABLET ORAL ONCE
Refills: 0 | Status: COMPLETED | OUTPATIENT
Start: 2021-01-17 | End: 2021-01-17

## 2021-01-17 RX ADMIN — Medication 650 MILLIGRAM(S): at 21:51

## 2021-01-17 RX ADMIN — OXYCODONE AND ACETAMINOPHEN 1 TABLET(S): 5; 325 TABLET ORAL at 18:08

## 2021-01-17 RX ADMIN — Medication 800 MILLIGRAM(S): at 18:09

## 2021-01-17 NOTE — ED PROVIDER NOTE - CARE PLAN
Principal Discharge DX:	Acute pain of right shoulder   Principal Discharge DX:	Acute pain of right shoulder  Secondary Diagnosis:	COVID-19

## 2021-01-17 NOTE — ED PROVIDER NOTE - PROGRESS NOTE DETAILS
Will move patient to main ED for psych evaluation, signed out to Dr. Rodarte, triage nurse aware. Tele amber consulted, will see pt shortly. TP called, pt is cleared psychiatrically, safety plan and referrals faxed over, pt also covid +, is in no respiratory distress, her oxygen  at is 97-98% on RA, home care instructions and covid kit provided to her on discharge pt signed out to me from dr izaguirre, pt is pending psyciatric evaluation, has h/o bipolar, currently not on medications and has no follow up, cutting noted to her left arm

## 2021-01-17 NOTE — ED BEHAVIORAL HEALTH ASSESSMENT NOTE - DETAILS
mother with schizophrenia denies SI in the past month knee, shoulder pain safety plan faxed to patient n/a

## 2021-01-17 NOTE — ED BEHAVIORAL HEALTH ASSESSMENT NOTE - ADDITIONAL DETAILS ALL
Suicide attempt at age 15 by overdose. She was psychiatrically hospitalized after this attempt. Suicide attempt in her 20s by slitting wrists, no hospitalization afterward. Also reported an aborted attempt when she stopped herself from jumping in front of a bus.

## 2021-01-17 NOTE — ED BEHAVIORAL HEALTH ASSESSMENT NOTE - DESCRIPTION
HTN, HLD, neuralgia ICU Vital Signs Last 24 Hrs  T(C): 36.9 (17 Jan 2021 19:42), Max: 36.9 (17 Jan 2021 17:15)  T(F): 98.4 (17 Jan 2021 19:42), Max: 98.4 (17 Jan 2021 17:15)  HR: 77 (17 Jan 2021 19:42) (77 - 85)  BP: 137/74 (17 Jan 2021 19:42) (137/74 - 137/92)  BP(mean): --  ABP: --  ABP(mean): --  RR: 18 (17 Jan 2021 19:42) (17 - 18)  SpO2: 97% (17 Jan 2021 19:42) (97% - 99%) Domiciled

## 2021-01-17 NOTE — ED BEHAVIORAL HEALTH ASSESSMENT NOTE - OTHER PAST PSYCHIATRIC HISTORY (INCLUDE DETAILS REGARDING ONSET, COURSE OF ILLNESS, INPATIENT/OUTPATIENT TREATMENT)
One psychiatric hospitalization, two suicide attempts (see above). Reports she's been diagnosed with bipolar type II. Psyches reports bipolar type I. Denies psychiatric hospitalizations due to patti.

## 2021-01-17 NOTE — ED ADULT NURSE NOTE - ED STAT RN HANDOFF DETAILS
Report received from RN at 7pm. Assessment available on [facility]. will continue to monitor. CO in progress

## 2021-01-17 NOTE — ED PROVIDER NOTE - SKIN, MLM
Skin normal color for race, warm, dry and intact. No evidence of rash. + multiple superficial lacerations to left forearm

## 2021-01-17 NOTE — ED ADULT TRIAGE NOTE - CHIEF COMPLAINT QUOTE
49 y/o female with PMH of HTN, T3DM, VERTIGO, IC NEURALGIA. Presents to the ED with C/C with right sided knee and shoulder pain from a fall down stairs 10 stairs 2 days ago. PT denies any loc. pt also requesting the covid swab as she is exposed to a positive family member.

## 2021-01-17 NOTE — ED ADULT NURSE NOTE - PSH
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

## 2021-01-17 NOTE — ED BEHAVIORAL HEALTH NOTE - BEHAVIORAL HEALTH NOTE
===================  PRE-HOSPITAL COURSE  ===================    SOURCE: Chart     DETAILS: Patient arrived to the ED via walk-in for somatic complaints initially, later revealed worsening depression with recent SIB    ============  ED COURSE   ============    SOURCE: ED, Chart    ARRIVAL: Walk-in    BELONGINGS: No items of note    BEHAVIOR: Patient arrived to the ED alert and oriented x3, patient was cooperative with ED medical and safety protocols. Patient reported depressed mood, affect was congruent and she was tearful at times. Patient denied SI but admitted to recent cutting episode with noted healed laceration to wrist. Patient denied HI, she did not report or exhibit any overt psychotic/manic symptoms, her thought process and speech were WNL. Patient remained in good behavioral control while in the ED.     TREATMENT: No PRN psychiatric medication prior to assessment     VISITORS: None      ========================  COLLATERAL  ========================    NAME: Sade    NUMBER: [phone]    RELATIONSHIP: Daughter    RELIABILITY: Good    COMMENTS: Close with patient     ========================  HPI  ========================    BASELINE FUNCTIONING: Patient resides in an apartment in a home, she is generally high functioning and very involved with granddaughter. Patient hasn’t been in any outpatient treatment for some time due to insurance issues.     DATE HPI STARTED: A few days ago    DECOMPENSATION: A few days ago patient had got upset with family, admitted to cutting herself a few days ago and told daughter she does this sometimes when she is feeling upset. No SI, no past attempts or serious self-harm known. Daughter states patient has been out of treatment for some time due to insurance issues but had seemed to be doing well overall, in recent weeks/days very involved with granddaughter and seemingly in a good mood with no major changes observed/reported. Daughter doesn’t feel patient is imminently dangerous to self/others and believes patient would be safe for d/c with outpatient follow up if cleared.     SUICIDALITY: No SI    VIOLENCE: None    SUBSTANCE: None known    ========================  PAST PSYCHIATRIC HISTORY  ========================    DATE PAST PSYCHIATRIC HISTORY STARTED: Chronic hx    MAIN PSYCHIATRIC DIAGNOSIS: Bipolar    PSYCHIATRIC HOSPITALIZATIONS: None known    SUICIDALITY:  No past SI/self-harm known    VIOLENCE: No violence hx    SUBSTANCE USE: No substance hx    ==============  OTHER HISTORY  ==============    CURRENT MEDICATION: No psych medications    LEGAL ISSUES: None known    FIREARM ACCESS: None known    SOCIAL HISTORY: No known trauma/abuse hx    FAMILY HISTORY: None known    1.	*In the past 14 days, has the patient been around anyone with a positive COVID-19 test?*   (  ) Yes   ( x ) No   (  ) Unknown- Reason (e.g. collateral uncertain, refusing to answer, etc.):  ______  IF YES PROCEED TO QUESTION #2. IF NO or UNKNOWN, PLEASE SKIP TO QUESTION #7  2.	Was the patient within 6 feet of them for at least 15 minutes? (  ) Yes   (  ) No   (  ) Unknown- Reason: ______    3.	Did the patient provide care for them? (  ) Yes   (  ) No   (  ) Unknown- Reason: ______    4.	Did the patient have direct physical contact with them (touched, hugged, or kissed them)? (  ) Yes   (  ) No    (  ) Unknown- Reason: ______    5.	Did the patient share eating or drinking utensils with them? (  ) Yes   (  ) No    (  ) Unknown- Reason: ______    6.	Have they sneezed, coughed, or somehow got respiratory droplets on the patient? (  ) Yes   (  ) No    (  ) Unknown- Reason: ______    7.	*Has the patient been out of [place] within the past 14 days?*  (  ) Yes   ( x ) No   (  ) Unknown- Reason (e.g. collateral uncertain, refusing to answer, etc.): _______  IF YES PLEASE ANSWER THE FOLLOWING QUESTIONS:  8.	Which state/country has the patient been to? ______   9.	Was the patient there over 24 hours? (  ) Yes   (  ) No    (  ) Unknown- Reason: ______    10.	What date did the patient return to [place]? ______

## 2021-01-17 NOTE — ED ADULT NURSE NOTE - OBJECTIVE STATEMENT
Pt s/p fall 2 days ago, c/o of right shoulder, right knee and back pain. Pt fell 10 steps down. Denies any loc or head injury.. fresh cuts noted on her left wrist. pt stated that she had and argument with her daughter yesterday and she was emotional when she cut herself. dnies any SI or HI. Constant observation initiated. and pt was sent to the main

## 2021-01-17 NOTE — ED ADULT NURSE REASSESSMENT NOTE - NS ED NURSE REASSESS COMMENT FT1
Patient received from [facility] with c/o right shoulder and knee pains and swelling s/p falling down a flight of 10 steps x 2days ago. She also has self inflicted lacerations to left inner wrist area which she did the same day she fell as she had an argument with her daughter. States its is her defence mechanism and does not want to hurt, kill self or others.

## 2021-01-17 NOTE — ED BEHAVIORAL HEALTH ASSESSMENT NOTE - HPI (INCLUDE ILLNESS QUALITY, SEVERITY, DURATION, TIMING, CONTEXT, MODIFYING FACTORS, ASSOCIATED SIGNS AND SYMPTOMS)
50 year-old woman diagnosed with bipolar disorder, one psychiatric hospitalization, two suicide attempts, domiciled, denying substance abuse, single, who presented to the ED complaining of knee and shoulder pain. Psychiatry was consulted because the patient recently cut and has been feeling depressed. Patient reports she fell out of psychiatric care in June 2020 during the pandemic due to a change in her insurance coverage. She says she was participating in video med management sessions and video psychotherapy sessions before her insurance changed. Reports she hasn't been on her psychiatric medications Abilify or sertraline since June 2020. She says since falling out of care she has been feeling more depressed. She reports the pandemic worsens her depression because of the isolation it has caused her. She says the reason she cut yesterday was because she got into a fight with her daughter. She says she has cut all of her life and finds that it relieves tension. She says when she cut her arm she did it to relieve tension, not to end her life. She denies SI or HI. She says her 4 grandchildren make her life worth living. She also notes that her daughter, despite having a michael relationship with her, means a lot to her. In fact, the patient says it was her daughter who urged her to tell the doctor at the ED about her recent cutting and to ask for help. The patient denies anhedonia or hopelessness. She denies interest in inpatient treatment. She reports interest in outpatient referrals. She denies symptoms of patti or psychosis presently. She reports in the past she has felt manic because of having decreased need for sleep.     Regarding COVID screening, patient requested COVID test from ED physician due to concern for recent exposure.    See SW note for collateral from daughter Quyen.

## 2021-01-17 NOTE — ED PROVIDER NOTE - CLINICAL SUMMARY MEDICAL DECISION MAKING FREE TEXT BOX
51yo F w/ PMH bipolar, DM, HTN pw R shoulder, R knee pain s/p fall down flight of stairs 48hrs ago. No LOC, not on AC. Pt also w/ superficial self inflicted cuts to LUE. Pt reports argument w/ daughter whom lives upstairs. Pt denies SI/HI/AH/VH. States cutting helps her w/ emotional pain. Pt w/ outpt Psych in [place], lost to f/u d/t pandemic / insurance change / move to [place]. Will obtain medical w/u, r/o acute injury 2/2 fall down stairs. Psych eval. Pt care signed out at change of shift.

## 2021-01-17 NOTE — ED PROVIDER NOTE - ATTENDING CONTRIBUTION TO CARE
51yo F w/ PMH bipolar, DM, HTN pw R shoulder, R knee pain s/p fall down flight of stairs 48hrs ago. No LOC, not on AC. Pt also w/ superficial self inflicted cuts to LUE. Pt reports argument w/ daughter whom lives upstairs. Pt denies SI/HI/AH/VH. States cutting helps her w/ emotional pain. Pt w/ outpt Psych in [place], lost to f/u d/t pandemic / insurance change / move to [place]. Will obtain medical w/u, r/o acute injury 2/2 fall down stairs. Psych eval.

## 2021-01-17 NOTE — ED PROVIDER NOTE - PMH
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

## 2021-01-17 NOTE — ED PROVIDER NOTE - PATIENT PORTAL LINK FT
You can access the FollowMyHealth Patient Portal offered by [facility] by registering at the following website: [email or web address]. By joining [facility]’s FollowMyHealth portal, you will also be able to view your health information using other applications (apps) compatible with our system.

## 2021-01-17 NOTE — ED PROVIDER NOTE - MUSCULOSKELETAL MINIMAL EXAM
+ TTP right anterior shoulder with decreased ROM, + TTP right anterior knee with decreased ROM. + TTP mid thoracic and midline vertebral tenderness. radial and dp pulses equal and intact bialterally. no chest wall tenderness. no pelvic tenderness.

## 2021-01-17 NOTE — ED PROVIDER NOTE - OBJECTIVE STATEMENT
51 yo female with h/o DM, HTN, neuralgia presents to the ED c/o right shoulder and right knee pain s/p fall down stairs 2 days ago. Patient states she tripped and fell down 10 stairs. + head trauma, no LOC. no headache, dizziness or visual changes. Patient ambulating since fall however with discomfort. 51 yo female with h/o DM, HTN, neuralgia presents to the ED c/o right shoulder, right knee pain and back pain s/p fall down stairs 2 days ago. Patient states she tripped and fell down 10 stairs. + head trauma, no LOC. no headache, dizziness or visual changes. Patient ambulating since fall however with discomfort. Patient takes baby aspirin. Upon further requesting patient admits that she has a h/o bipolar disorder and recently her cut left forearm (severl days ago), shes not taking her normal psych meds due to insurance issues and currently does not have a psychiatrist. Patient is very depressed and requests to speak to psychiatrist. Patient also requesting covid swab. Denies fever, chills, chest pain, sob, abd pain, N/V, UE/LE weakness or paresthesias. no UE/LE weakness or paresthesias, no saddle anesthesia, no bowel or bladder incontinence/retention, no prior back surgery. Patient ambulating since fall. 51 yo female with h/o DM, HTN, neuralgia presents to the ED c/o right shoulder, right knee pain and back pain s/p fall down stairs 2 days ago. Patient states she tripped and fell down 10 stairs. + head trauma, no LOC. no headache, dizziness or visual changes. Patient ambulating since fall however with discomfort. Patient takes baby aspirin. Upon further requesting patient admits that she has a h/o bipolar disorder and recently her cut left forearm (several days ago), shes not taking her normal psych meds due to insurance issues and currently does not have a psychiatrist. Patient is very depressed and requests to speak to psychiatrist. Patient also requesting covid swab. Denies fever, chills, chest pain, sob, abd pain, N/V, UE/LE weakness or paresthesias. no UE/LE weakness or paresthesias, no saddle anesthesia, no bowel or bladder incontinence/retention, no prior back surgery. Patient ambulating since fall.

## 2021-01-17 NOTE — ED BEHAVIORAL HEALTH ASSESSMENT NOTE - RISK ASSESSMENT
Acute risk for harm to self is low, chronic risk is elevated. Acute risk is low because she's denying SI/HI, is future-oriented, and identifies reasons life is worth living. Chronic risk is elevated due to two suicide attempts in the past. The patient engaged in safety planning and is agreeable to return to the ED or call 911 in the event of SI/HI. Low Acute Suicide Risk

## 2021-01-18 LAB
SARS-COV-2 IGG SERPL QL IA: NEGATIVE — SIGNIFICANT CHANGE UP
SARS-COV-2 IGM SERPL IA-ACNC: 0.07 INDEX — SIGNIFICANT CHANGE UP

## 2021-02-02 ENCOUNTER — APPOINTMENT (OUTPATIENT)
Dept: GASTROENTEROLOGY | Facility: CLINIC | Age: 51
End: 2021-02-02
Payer: MEDICARE

## 2021-02-02 PROBLEM — Z00.00 ENCOUNTER FOR PREVENTIVE HEALTH EXAMINATION: Status: ACTIVE | Noted: 2021-02-02

## 2021-02-02 PROCEDURE — 99447 NTRPROF PH1/NTRNET/EHR 11-20: CPT

## 2021-02-04 NOTE — HISTORY OF PRESENT ILLNESS
[de-identified] : This patient still has bouts of diarrhea as her stools not solid. She is on Bentyl and Questran. She had a colonoscopy in 2018 and had 2 polyps removed. She complains of nausea. She is asking for some Zofran

## 2021-02-04 NOTE — ASSESSMENT
[FreeTextEntry1] : This patient has nausea now and requests Zofran. My plan will be\par \par Colonoscopy and EGD with biopsies of the colon particularly because of the diarrhea\par Stool studies\par Zofran sent

## 2021-02-08 ENCOUNTER — APPOINTMENT (OUTPATIENT)
Dept: INTERNAL MEDICINE | Facility: CLINIC | Age: 51
End: 2021-02-08

## 2021-02-10 ENCOUNTER — APPOINTMENT (OUTPATIENT)
Dept: GASTROENTEROLOGY | Facility: HOSPITAL | Age: 51
End: 2021-02-10

## 2021-02-11 ENCOUNTER — APPOINTMENT (OUTPATIENT)
Dept: NEUROLOGY | Facility: CLINIC | Age: 51
End: 2021-02-11
Payer: MEDICARE

## 2021-02-11 PROCEDURE — 99214 OFFICE O/P EST MOD 30 MIN: CPT | Mod: 95

## 2021-02-11 RX ORDER — TRAZODONE HYDROCHLORIDE 50 MG/1
50 TABLET ORAL
Refills: 0 | Status: DISCONTINUED | COMMUNITY
Start: 2020-10-27 | End: 2021-02-11

## 2021-02-11 NOTE — PHYSICAL EXAM
[Person] : oriented to person [Place] : oriented to place [Time] : oriented to time [Short Term Intact] : short term memory intact [Fluency] : fluency intact [Current Events] : adequate knowledge of current events [Cranial Nerves Oculomotor (III)] : extraocular motion intact [Cranial Nerves Facial (VII)] : face symmetrical [Cranial Nerves Vestibulocochlear (VIII)] : hearing was intact bilaterally [Paresis Pronator Drift Right-Sided] : no pronator drift on the right [Paresis Pronator Drift Left-Sided] : no pronator drift on the left [FreeTextEntry9] : walks steadily with  cane

## 2021-02-11 NOTE — DISCUSSION/SUMMARY
[FreeTextEntry1] : 50-year-old woman who is here for initial consultation of intercostal neuralgia that seems to be difficult to treat.  She will need to see pain management for further management.  Patient was asked to forward her MRI of the thoracic spine along with previous records to my office.\par \par physician location: office\par patient location: home\par \par I spent 45minutes of total time, during which more than 50% of the time was spent on counseling. I explained the diagnosis, other possible diagnoses, workup, and management, as well as answered any questions.\par \par This is a telemedicine visit that was performed using real time 2-way audiovisual technology. Verbal consent to participate in video visit was obtained and patient was aware of their right to refuse to participate in services delivered via telemedicine and the alternative and potential limitations of participating in a telemedicine visit vs a face-to-face visit; I have also informed the patient of my current location in the [facility] and the names of all persons participating in the telemedicine service and their role in the encounter. The patient agrees to have this service via Telehealth.\par \par  This visit occurred during the Coronavirus (COVID-19) Public Health Emergency. I discussed with the patient the nature of our telemedicine visits, that: \par • I would evaluate the patient and recommend diagnostics and treatments based on my assessment \par • Our sessions are not being recorded and that personal health information is protected \par • Our team would provide follow up care in person if/when the patient needs it.\par

## 2021-02-16 ENCOUNTER — APPOINTMENT (OUTPATIENT)
Dept: GASTROENTEROLOGY | Facility: CLINIC | Age: 51
End: 2021-02-16

## 2021-03-01 ENCOUNTER — OUTPATIENT (OUTPATIENT)
Dept: OUTPATIENT SERVICES | Facility: HOSPITAL | Age: 51
LOS: 1 days | End: 2021-03-01
Payer: MEDICARE

## 2021-03-01 DIAGNOSIS — Z98.890 OTHER SPECIFIED POSTPROCEDURAL STATES: Chronic | ICD-10-CM

## 2021-03-01 DIAGNOSIS — Z90.49 ACQUIRED ABSENCE OF OTHER SPECIFIED PARTS OF DIGESTIVE TRACT: Chronic | ICD-10-CM

## 2021-03-04 ENCOUNTER — INPATIENT (INPATIENT)
Facility: HOSPITAL | Age: 51
LOS: 7 days | Discharge: HOME | End: 2021-03-12
Attending: INTERNAL MEDICINE | Admitting: INTERNAL MEDICINE
Payer: MEDICARE

## 2021-03-04 VITALS
HEART RATE: 78 BPM | DIASTOLIC BLOOD PRESSURE: 76 MMHG | WEIGHT: 293 LBS | OXYGEN SATURATION: 99 % | HEIGHT: 68 IN | RESPIRATION RATE: 18 BRPM | TEMPERATURE: 98 F | SYSTOLIC BLOOD PRESSURE: 132 MMHG

## 2021-03-04 DIAGNOSIS — Z98.890 OTHER SPECIFIED POSTPROCEDURAL STATES: Chronic | ICD-10-CM

## 2021-03-04 DIAGNOSIS — Z90.49 ACQUIRED ABSENCE OF OTHER SPECIFIED PARTS OF DIGESTIVE TRACT: Chronic | ICD-10-CM

## 2021-03-04 LAB
ALBUMIN SERPL ELPH-MCNC: 3.2 G/DL — LOW (ref 3.3–5)
ALP SERPL-CCNC: 139 U/L — HIGH (ref 40–120)
ALT FLD-CCNC: 29 U/L — SIGNIFICANT CHANGE UP (ref 12–78)
ANION GAP SERPL CALC-SCNC: 8 MMOL/L — SIGNIFICANT CHANGE UP (ref 5–17)
APPEARANCE UR: CLEAR — SIGNIFICANT CHANGE UP
AST SERPL-CCNC: 23 U/L — SIGNIFICANT CHANGE UP (ref 15–37)
BACTERIA # UR AUTO: NEGATIVE — SIGNIFICANT CHANGE UP
BILIRUB SERPL-MCNC: 0.4 MG/DL — SIGNIFICANT CHANGE UP (ref 0.2–1.2)
BILIRUB UR-MCNC: NEGATIVE — SIGNIFICANT CHANGE UP
BUN SERPL-MCNC: 16 MG/DL — SIGNIFICANT CHANGE UP (ref 7–23)
CALCIUM SERPL-MCNC: 8.8 MG/DL — SIGNIFICANT CHANGE UP (ref 8.5–10.1)
CHLORIDE SERPL-SCNC: 106 MMOL/L — SIGNIFICANT CHANGE UP (ref 96–108)
CO2 SERPL-SCNC: 26 MMOL/L — SIGNIFICANT CHANGE UP (ref 22–31)
COLOR SPEC: YELLOW — SIGNIFICANT CHANGE UP
CREAT SERPL-MCNC: 0.67 MG/DL — SIGNIFICANT CHANGE UP (ref 0.5–1.3)
DIFF PNL FLD: ABNORMAL
EPI CELLS # UR: SIGNIFICANT CHANGE UP
FLUAV AG NPH QL: SIGNIFICANT CHANGE UP
FLUBV AG NPH QL: SIGNIFICANT CHANGE UP
GLUCOSE BLDC GLUCOMTR-MCNC: 80 MG/DL — SIGNIFICANT CHANGE UP (ref 70–99)
GLUCOSE BLDC GLUCOMTR-MCNC: 81 MG/DL — SIGNIFICANT CHANGE UP (ref 70–99)
GLUCOSE BLDC GLUCOMTR-MCNC: 96 MG/DL — SIGNIFICANT CHANGE UP (ref 70–99)
GLUCOSE SERPL-MCNC: 97 MG/DL — SIGNIFICANT CHANGE UP (ref 70–99)
GLUCOSE UR QL: NEGATIVE MG/DL — SIGNIFICANT CHANGE UP
HCG SERPL-ACNC: <1 MIU/ML — SIGNIFICANT CHANGE UP
KETONES UR-MCNC: NEGATIVE — SIGNIFICANT CHANGE UP
LACTATE SERPL-SCNC: 0.7 MMOL/L — SIGNIFICANT CHANGE UP (ref 0.7–2)
LEUKOCYTE ESTERASE UR-ACNC: ABNORMAL
LIDOCAIN IGE QN: 136 U/L — SIGNIFICANT CHANGE UP (ref 73–393)
NITRITE UR-MCNC: NEGATIVE — SIGNIFICANT CHANGE UP
PH UR: 5 — SIGNIFICANT CHANGE UP (ref 5–8)
POTASSIUM SERPL-MCNC: 4.4 MMOL/L — SIGNIFICANT CHANGE UP (ref 3.5–5.3)
POTASSIUM SERPL-SCNC: 4.4 MMOL/L — SIGNIFICANT CHANGE UP (ref 3.5–5.3)
PROT SERPL-MCNC: 8.1 GM/DL — SIGNIFICANT CHANGE UP (ref 6–8.3)
PROT UR-MCNC: 15 MG/DL
RBC CASTS # UR COMP ASSIST: SIGNIFICANT CHANGE UP /HPF (ref 0–4)
SARS-COV-2 RNA SPEC QL NAA+PROBE: SIGNIFICANT CHANGE UP
SODIUM SERPL-SCNC: 140 MMOL/L — SIGNIFICANT CHANGE UP (ref 135–145)
SP GR SPEC: 1.01 — SIGNIFICANT CHANGE UP (ref 1.01–1.02)
UROBILINOGEN FLD QL: NEGATIVE MG/DL — SIGNIFICANT CHANGE UP
WBC UR QL: SIGNIFICANT CHANGE UP

## 2021-03-04 PROCEDURE — 99285 EMERGENCY DEPT VISIT HI MDM: CPT

## 2021-03-04 PROCEDURE — 74177 CT ABD & PELVIS W/CONTRAST: CPT | Mod: 26,MA

## 2021-03-04 PROCEDURE — 99223 1ST HOSP IP/OBS HIGH 75: CPT

## 2021-03-04 RX ORDER — METRONIDAZOLE 500 MG
500 TABLET ORAL ONCE
Refills: 0 | Status: COMPLETED | OUTPATIENT
Start: 2021-03-04 | End: 2021-03-04

## 2021-03-04 RX ORDER — METFORMIN HYDROCHLORIDE 850 MG/1
1 TABLET ORAL
Qty: 0 | Refills: 0 | DISCHARGE

## 2021-03-04 RX ORDER — INSULIN LISPRO 100/ML
10 VIAL (ML) SUBCUTANEOUS
Refills: 0 | Status: DISCONTINUED | OUTPATIENT
Start: 2021-03-04 | End: 2021-03-12

## 2021-03-04 RX ORDER — CYCLOBENZAPRINE HYDROCHLORIDE 10 MG/1
1 TABLET, FILM COATED ORAL
Qty: 0 | Refills: 0 | DISCHARGE

## 2021-03-04 RX ORDER — INSULIN GLARGINE 100 [IU]/ML
30 INJECTION, SOLUTION SUBCUTANEOUS AT BEDTIME
Refills: 0 | Status: DISCONTINUED | OUTPATIENT
Start: 2021-03-04 | End: 2021-03-12

## 2021-03-04 RX ORDER — SIMETHICONE 80 MG/1
0 TABLET, CHEWABLE ORAL
Qty: 0 | Refills: 0 | DISCHARGE

## 2021-03-04 RX ORDER — ONDANSETRON 8 MG/1
8 TABLET, FILM COATED ORAL ONCE
Refills: 0 | Status: COMPLETED | OUTPATIENT
Start: 2021-03-04 | End: 2021-03-04

## 2021-03-04 RX ORDER — FUROSEMIDE 40 MG
0 TABLET ORAL
Qty: 0 | Refills: 0 | DISCHARGE

## 2021-03-04 RX ORDER — SIMETHICONE 80 MG/1
80 TABLET, CHEWABLE ORAL THREE TIMES A DAY
Refills: 0 | Status: DISCONTINUED | OUTPATIENT
Start: 2021-03-04 | End: 2021-03-12

## 2021-03-04 RX ORDER — ARIPIPRAZOLE 15 MG/1
1 TABLET ORAL
Qty: 0 | Refills: 0 | DISCHARGE

## 2021-03-04 RX ORDER — MORPHINE SULFATE 50 MG/1
4 CAPSULE, EXTENDED RELEASE ORAL ONCE
Refills: 0 | Status: DISCONTINUED | OUTPATIENT
Start: 2021-03-04 | End: 2021-03-04

## 2021-03-04 RX ORDER — CIPROFLOXACIN LACTATE 400MG/40ML
400 VIAL (ML) INTRAVENOUS EVERY 12 HOURS
Refills: 0 | Status: DISCONTINUED | OUTPATIENT
Start: 2021-03-04 | End: 2021-03-10

## 2021-03-04 RX ORDER — SERTRALINE 25 MG/1
50 TABLET, FILM COATED ORAL DAILY
Refills: 0 | Status: DISCONTINUED | OUTPATIENT
Start: 2021-03-04 | End: 2021-03-05

## 2021-03-04 RX ORDER — METRONIDAZOLE 500 MG
500 TABLET ORAL ONCE
Refills: 0 | Status: DISCONTINUED | OUTPATIENT
Start: 2021-03-04 | End: 2021-03-04

## 2021-03-04 RX ORDER — INSULIN DEGLUDEC 100 U/ML
0 INJECTION, SOLUTION SUBCUTANEOUS
Qty: 0 | Refills: 0 | DISCHARGE

## 2021-03-04 RX ORDER — SODIUM CHLORIDE 9 MG/ML
1000 INJECTION, SOLUTION INTRAVENOUS
Refills: 0 | Status: DISCONTINUED | OUTPATIENT
Start: 2021-03-04 | End: 2021-03-06

## 2021-03-04 RX ORDER — SODIUM CHLORIDE 9 MG/ML
1000 INJECTION INTRAMUSCULAR; INTRAVENOUS; SUBCUTANEOUS ONCE
Refills: 0 | Status: COMPLETED | OUTPATIENT
Start: 2021-03-04 | End: 2021-03-04

## 2021-03-04 RX ORDER — MORPHINE SULFATE 50 MG/1
2 CAPSULE, EXTENDED RELEASE ORAL EVERY 4 HOURS
Refills: 0 | Status: DISCONTINUED | OUTPATIENT
Start: 2021-03-04 | End: 2021-03-05

## 2021-03-04 RX ORDER — LIDOCAINE 4 G/100G
1 CREAM TOPICAL
Qty: 0 | Refills: 0 | DISCHARGE

## 2021-03-04 RX ORDER — INSULIN ASPART 100 [IU]/ML
0 INJECTION, SOLUTION SUBCUTANEOUS
Qty: 0 | Refills: 0 | DISCHARGE

## 2021-03-04 RX ORDER — PANTOPRAZOLE SODIUM 20 MG/1
40 TABLET, DELAYED RELEASE ORAL DAILY
Refills: 0 | Status: DISCONTINUED | OUTPATIENT
Start: 2021-03-04 | End: 2021-03-12

## 2021-03-04 RX ORDER — CYCLOBENZAPRINE HYDROCHLORIDE 10 MG/1
10 TABLET, FILM COATED ORAL
Refills: 0 | Status: DISCONTINUED | OUTPATIENT
Start: 2021-03-04 | End: 2021-03-12

## 2021-03-04 RX ORDER — METRONIDAZOLE 500 MG
TABLET ORAL
Refills: 0 | Status: DISCONTINUED | OUTPATIENT
Start: 2021-03-04 | End: 2021-03-10

## 2021-03-04 RX ORDER — LOPERAMIDE HCL 2 MG
0 TABLET ORAL
Qty: 0 | Refills: 0 | DISCHARGE

## 2021-03-04 RX ORDER — INSULIN ASPART 100 [IU]/ML
12 INJECTION, SOLUTION SUBCUTANEOUS
Qty: 0 | Refills: 0 | DISCHARGE

## 2021-03-04 RX ORDER — CIPROFLOXACIN LACTATE 400MG/40ML
400 VIAL (ML) INTRAVENOUS ONCE
Refills: 0 | Status: DISCONTINUED | OUTPATIENT
Start: 2021-03-04 | End: 2021-03-04

## 2021-03-04 RX ORDER — TRAZODONE HCL 50 MG
1 TABLET ORAL
Qty: 0 | Refills: 0 | DISCHARGE

## 2021-03-04 RX ORDER — METRONIDAZOLE 500 MG
500 TABLET ORAL EVERY 8 HOURS
Refills: 0 | Status: DISCONTINUED | OUTPATIENT
Start: 2021-03-05 | End: 2021-03-10

## 2021-03-04 RX ORDER — LOPERAMIDE HCL 2 MG
2 TABLET ORAL
Refills: 0 | Status: DISCONTINUED | OUTPATIENT
Start: 2021-03-04 | End: 2021-03-12

## 2021-03-04 RX ORDER — ENOXAPARIN SODIUM 100 MG/ML
40 INJECTION SUBCUTANEOUS DAILY
Refills: 0 | Status: DISCONTINUED | OUTPATIENT
Start: 2021-03-04 | End: 2021-03-12

## 2021-03-04 RX ORDER — SIMETHICONE 80 MG/1
80 TABLET, CHEWABLE ORAL THREE TIMES A DAY
Refills: 0 | Status: DISCONTINUED | OUTPATIENT
Start: 2021-03-04 | End: 2021-03-04

## 2021-03-04 RX ADMIN — MORPHINE SULFATE 4 MILLIGRAM(S): 50 CAPSULE, EXTENDED RELEASE ORAL at 15:52

## 2021-03-04 RX ADMIN — SODIUM CHLORIDE 1000 MILLILITER(S): 9 INJECTION INTRAMUSCULAR; INTRAVENOUS; SUBCUTANEOUS at 15:30

## 2021-03-04 RX ADMIN — MORPHINE SULFATE 2 MILLIGRAM(S): 50 CAPSULE, EXTENDED RELEASE ORAL at 20:38

## 2021-03-04 RX ADMIN — Medication 100 MILLIGRAM(S): at 19:58

## 2021-03-04 RX ADMIN — MORPHINE SULFATE 2 MILLIGRAM(S): 50 CAPSULE, EXTENDED RELEASE ORAL at 20:08

## 2021-03-04 RX ADMIN — SODIUM CHLORIDE 1000 MILLILITER(S): 9 INJECTION INTRAMUSCULAR; INTRAVENOUS; SUBCUTANEOUS at 14:32

## 2021-03-04 RX ADMIN — MORPHINE SULFATE 4 MILLIGRAM(S): 50 CAPSULE, EXTENDED RELEASE ORAL at 17:51

## 2021-03-04 RX ADMIN — MORPHINE SULFATE 4 MILLIGRAM(S): 50 CAPSULE, EXTENDED RELEASE ORAL at 14:30

## 2021-03-04 RX ADMIN — MORPHINE SULFATE 4 MILLIGRAM(S): 50 CAPSULE, EXTENDED RELEASE ORAL at 17:20

## 2021-03-04 RX ADMIN — ONDANSETRON 8 MILLIGRAM(S): 8 TABLET, FILM COATED ORAL at 14:30

## 2021-03-04 RX ADMIN — SODIUM CHLORIDE 100 MILLILITER(S): 9 INJECTION, SOLUTION INTRAVENOUS at 19:58

## 2021-03-04 NOTE — ED PROVIDER NOTE - TOBACCO USE
Unclear chronicity of etiology but prior history of L. hemiarthroplasty in 01/2020 and hip was imaging after shows implant in place  Plan originally  for possible revision camilo arthroplasty vs. total arthroplasty, possible antibiotic spacer, and possible resection arthroplasty by Orthopaedic surgery 04/24/20 however patient tested positive for COVID19 so will be postponed to unknown date due to familial concerns relating to potential deterioration during/after surgery due to covid19.  Doubt significant chronicity given recent onset of pain and continued pain over this joint on exam today. IR consulted for possibly aspiration of L hip to rule out septic joint given markedly elevated inflammatory markers however no discrete fluid collection seen on CT left femur/pelvis and COVID19 may be likely etiology    - Repeat CBC and CMP daily   - pain control with acetaminophen (mild/mod pain) or oxycodone (severe pain) PRN  -Continue bucks traction until ortho states otherwise.  - appreciate ortho recs, will likely need reduction before discharge Unclear chronicity of etiology but prior history of L. hemiarthroplasty in 01/2020 and hip was imaging after shows implant in place  Plan originally  for possible revision camilo arthroplasty vs. total arthroplasty, possible antibiotic spacer, and possible resection arthroplasty by Orthopaedic surgery 04/24/20 however patient tested positive for COVID19 so will be postponed to unknown date due to familial concerns relating to potential deterioration during/after surgery due to covid19.  Doubt significant chronicity given recent onset of pain and continued pain over this joint on exam today. IR consulted for possibly aspiration of L hip to rule out septic joint given markedly elevated inflammatory markers however no discrete fluid collection seen on CT left femur/pelvis and COVID19 may be likely etiology    - Repeat CBC and CMP daily   - pain control with acetaminophen (mild/mod pain) or oxycodone (severe pain) PRN  -Continue bucks traction until ortho states otherwise.  - appreciate ortho recs, will likely need reduction before discharge--ortho resident stated he would talk to family regarding options prior to discharge. Unknown if ever smoked

## 2021-03-04 NOTE — ED PROVIDER NOTE - CLINICAL SUMMARY MEDICAL DECISION MAKING FREE TEXT BOX
intractable diarrhea, pt with persistent abd pain with enteritis - will place on Abx as pt with leukocytosis given morphine x2 and zofran/fluids etc.

## 2021-03-04 NOTE — H&P ADULT - ASSESSMENT
50 year old female PMH of DM, HTN, IBS, PSH of cholecystectomy hx presenting to ED due to abdominal pain, severe, nausea/vomiting and diarrhea since yesterday. Noted profuse vomiting and diarrhea which has been very active - to the point of using adult diapers at home. No fevers or abdominal pain.     GI:       DM:  50 year old female PMH of DM, HTN, IBS, PSH of cholecystectomy hx presenting to ED due to abdominal pain, severe, nausea/vomiting and diarrhea since yesterday. Noted profuse vomiting and diarrhea which has been very active - to the point of using adult diapers at home. No fevers or abdominal pain.     GI: Likely exacerbation of Irritable bowl syndrome. Responds  well to IVF, IV ABX for brief course. CT shows no SBO, mild colitis.   May need GI eval in AM. Start diabetic full liquid diet.  IV PPI and Simethicone tid. Add Immodium PRN. Add daily KCL.     DM:  Will decrease insulin coverage at home to Lantus 30, Ademolog 10 units tid. A1C in AM. Continue Lyrica for LE neuropathy at 150 mg bid.     CV: Continue Vasotec 10 mg bid at home dose 50 year old female PMH of DM, HTN, IBS, PSH of cholecystectomy hx presenting to ED due to abdominal pain, severe, nausea/vomiting and diarrhea since yesterday. Noted profuse vomiting and diarrhea which has been very active - to the point of using adult diapers at home. No fevers or abdominal pain.     GI: Likely exacerbation of Irritable bowl syndrome. Responds  well to IVF, IV ABX for brief course. CT shows no SBO, mild colitis.   May need GI eval in AM. Start diabetic full liquid diet.  IV PPI and Simethicone tid. Add Immodium PRN.      DM:  Will decrease insulin coverage at home to Lantus 30, Ademolog 10 units tid. A1C in AM. Continue Lyrica for LE neuropathy at 150 mg bid.     CV: Continue Vasotec 10 mg bid at home dose

## 2021-03-04 NOTE — H&P ADULT - NSICDXPASTSURGICALHX_GEN_ALL_CORE_FT
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

## 2021-03-04 NOTE — ED ADULT TRIAGE NOTE - CHIEF COMPLAINT QUOTE
abdominal pain with n/v/d since yesterday, states liver is leaking bile into intestines. pt c/o headache while vomiting felt something "pop" over right eye

## 2021-03-04 NOTE — H&P ADULT - HISTORY OF PRESENT ILLNESS
50 year old female PMH of DM, HTN, IBS, PSH of cholecystectomy hx presenting to ED due to abdominal pain, severe, nausea/vomiting and diarrhea since yesterday. Noted profuse vomiting and diarrhea which has been very active - to the point of using adult diapers at home. No fevers or abdominal pain.  50 year old female PMH of DM, HTN, IBS, PSH of cholecystectomy hx presenting to ED due to abdominal pain, severe, nausea/vomiting and diarrhea since yesterday. Noted profuse vomiting and diarrhea which has been very active - to the point of using adult diapers at home. No fevers or abdominal pain.     In past admissions she responds well to IVF and IV ABX for few days.  50 year old female PMH of DM, morbid obesity, HTN, IBS, PSH of cholecystectomy hx presenting to ED due to abdominal pain, severe, nausea/vomiting and diarrhea since yesterday. Noted profuse vomiting and diarrhea which has been very active - to the point of using adult diapers at home. No fevers or abdominal pain.     In past admissions she responds well to IVF and IV ABX for few days.

## 2021-03-04 NOTE — H&P ADULT - NSHPLABSRESULTS_GEN_ALL_CORE
LABS:                        12.1   16.52 )-----------( 504      ( 04 Mar 2021 14:36 )             36.9     03-04    140  |  106  |  16  ----------------------------<  97  4.4   |  26  |  0.67    Ca    8.8      04 Mar 2021 14:36    TPro  8.1  /  Alb  3.2<L>  /  TBili  0.4  /  DBili  x   /  AST  23  /  ALT  29  /  AlkPhos  139<H>  03-04      Urinalysis Basic - ( 04 Mar 2021 17:31 )    Color: Yellow / Appearance: Clear / S.015 / pH: x  Gluc: x / Ketone: Negative  / Bili: Negative / Urobili: Negative mg/dL   Blood: x / Protein: 15 mg/dL / Nitrite: Negative   Leuk Esterase: Small / RBC: 0-2 /HPF / WBC 0-2   Sq Epi: x / Non Sq Epi: Few / Bacteria: Negative          RADIOLOGY & ADDITIONAL TESTS:

## 2021-03-04 NOTE — ED PROVIDER NOTE - CONSTITUTIONAL, MLM
normal... Well appearing, awake, alert, oriented to person, place, time/situation and moderate distress secondary to pain.

## 2021-03-04 NOTE — H&P ADULT - NSHPPHYSICALEXAM_GEN_ALL_CORE
PHYSICAL EXAMINATION:  Vital Signs Last 24 Hrs  T(C): 36.8 (04 Mar 2021 12:06), Max: 36.8 (04 Mar 2021 12:06)  T(F): 98.2 (04 Mar 2021 12:06), Max: 98.2 (04 Mar 2021 12:06)  HR: 78 (04 Mar 2021 12:06) (78 - 78)  BP: 132/76 (04 Mar 2021 12:06) (132/76 - 132/76)  BP(mean): --  RR: 18 (04 Mar 2021 12:06) (18 - 18)  SpO2: 99% (04 Mar 2021 12:06) (99% - 99%)  CAPILLARY BLOOD GLUCOSE      POCT Blood Glucose.: 96 mg/dL (04 Mar 2021 17:13)      GENERAL: NAD, well-groomed, well-developed  HEAD:  atraumatic, normocephalic  EYES: sclera anicteric  ENMT: mucous membranes moist  NECK: supple, No JVD  CHEST/LUNG: clear to auscultation bilaterally; no rales, rhonchi, or wheezing b/l  HEART: normal S1, S2  ABDOMEN: BS+, soft, ND, NT   EXTREMITIES:  pulses palpable; no clubbing, cyanosis, or edema b/l LEs  NEURO: awake, alert, interactive; moves all extremities  SKIN: no rashes or lesions PHYSICAL EXAMINATION:  Vital Signs Last 24 Hrs  T(C): 36.8 (04 Mar 2021 12:06), Max: 36.8 (04 Mar 2021 12:06)  T(F): 98.2 (04 Mar 2021 12:06), Max: 98.2 (04 Mar 2021 12:06)  HR: 78 (04 Mar 2021 12:06) (78 - 78)  BP: 132/76 (04 Mar 2021 12:06) (132/76 - 132/76)  BP(mean): --  RR: 18 (04 Mar 2021 12:06) (18 - 18)  SpO2: 99% (04 Mar 2021 12:06) (99% - 99%)  CAPILLARY BLOOD GLUCOSE      POCT Blood Glucose.: 96 mg/dL (04 Mar 2021 17:13)      GENERAL: NAD, well-groomed, well-developed, seen in ER, no fevers or abdominal pain  HEAD:  atraumatic, normocephalic  EYES: sclera anicteric  ENMT: mucous membranes moist  NECK: supple, No JVD  CHEST/LUNG: clear to auscultation bilaterally; no rales, rhonchi, or wheezing b/l  HEART: normal S1, S2  ABDOMEN: BS+, soft, ND, NT   EXTREMITIES:  pulses palpable; no clubbing, cyanosis, or edema b/l LEs  NEURO: awake, alert, interactive; moves all extremities  SKIN: no rashes or lesions

## 2021-03-04 NOTE — H&P ADULT - NSICDXPASTMEDICALHX_GEN_ALL_CORE_FT
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

## 2021-03-05 DIAGNOSIS — F31.70 BIPOLAR DISORDER, CURRENTLY IN REMISSION, MOST RECENT EPISODE UNSPECIFIED: ICD-10-CM

## 2021-03-05 LAB
A1C WITH ESTIMATED AVERAGE GLUCOSE RESULT: 7.1 % — HIGH (ref 4–5.6)
ANION GAP SERPL CALC-SCNC: 6 MMOL/L — SIGNIFICANT CHANGE UP (ref 5–17)
BUN SERPL-MCNC: 13 MG/DL — SIGNIFICANT CHANGE UP (ref 7–23)
CALCIUM SERPL-MCNC: 8.3 MG/DL — LOW (ref 8.5–10.1)
CHLORIDE SERPL-SCNC: 105 MMOL/L — SIGNIFICANT CHANGE UP (ref 96–108)
CHOLEST SERPL-MCNC: 167 MG/DL — SIGNIFICANT CHANGE UP
CO2 SERPL-SCNC: 28 MMOL/L — SIGNIFICANT CHANGE UP (ref 22–31)
CREAT SERPL-MCNC: 0.73 MG/DL — SIGNIFICANT CHANGE UP (ref 0.5–1.3)
ESTIMATED AVERAGE GLUCOSE: 157 MG/DL — HIGH (ref 68–114)
GLUCOSE BLDC GLUCOMTR-MCNC: 105 MG/DL — HIGH (ref 70–99)
GLUCOSE BLDC GLUCOMTR-MCNC: 119 MG/DL — HIGH (ref 70–99)
GLUCOSE BLDC GLUCOMTR-MCNC: 133 MG/DL — HIGH (ref 70–99)
GLUCOSE BLDC GLUCOMTR-MCNC: 140 MG/DL — HIGH (ref 70–99)
GLUCOSE BLDC GLUCOMTR-MCNC: 69 MG/DL — LOW (ref 70–99)
GLUCOSE SERPL-MCNC: 76 MG/DL — SIGNIFICANT CHANGE UP (ref 70–99)
HCT VFR BLD CALC: 35.6 % — SIGNIFICANT CHANGE UP (ref 34.5–45)
HDLC SERPL-MCNC: 61 MG/DL — SIGNIFICANT CHANGE UP
HGB BLD-MCNC: 11.4 G/DL — LOW (ref 11.5–15.5)
LIPID PNL WITH DIRECT LDL SERPL: 91 MG/DL — SIGNIFICANT CHANGE UP
MCHC RBC-ENTMCNC: 29.3 PG — SIGNIFICANT CHANGE UP (ref 27–34)
MCHC RBC-ENTMCNC: 32 GM/DL — SIGNIFICANT CHANGE UP (ref 32–36)
MCV RBC AUTO: 91.5 FL — SIGNIFICANT CHANGE UP (ref 80–100)
NON HDL CHOLESTEROL: 105 MG/DL — SIGNIFICANT CHANGE UP
NRBC # BLD: 0 /100 WBCS — SIGNIFICANT CHANGE UP (ref 0–0)
PLATELET # BLD AUTO: 431 K/UL — HIGH (ref 150–400)
POTASSIUM SERPL-MCNC: 3.6 MMOL/L — SIGNIFICANT CHANGE UP (ref 3.5–5.3)
POTASSIUM SERPL-SCNC: 3.6 MMOL/L — SIGNIFICANT CHANGE UP (ref 3.5–5.3)
RBC # BLD: 3.89 M/UL — SIGNIFICANT CHANGE UP (ref 3.8–5.2)
RBC # FLD: 13.2 % — SIGNIFICANT CHANGE UP (ref 10.3–14.5)
SARS-COV-2 IGG SERPL QL IA: POSITIVE
SARS-COV-2 IGM SERPL IA-ACNC: 1.59 INDEX — HIGH
SODIUM SERPL-SCNC: 139 MMOL/L — SIGNIFICANT CHANGE UP (ref 135–145)
TRIGL SERPL-MCNC: 73 MG/DL — SIGNIFICANT CHANGE UP
WBC # BLD: 10.48 K/UL — SIGNIFICANT CHANGE UP (ref 3.8–10.5)
WBC # FLD AUTO: 10.48 K/UL — SIGNIFICANT CHANGE UP (ref 3.8–10.5)

## 2021-03-05 PROCEDURE — 99233 SBSQ HOSP IP/OBS HIGH 50: CPT

## 2021-03-05 PROCEDURE — 90792 PSYCH DIAG EVAL W/MED SRVCS: CPT

## 2021-03-05 RX ORDER — ONDANSETRON 8 MG/1
4 TABLET, FILM COATED ORAL ONCE
Refills: 0 | Status: COMPLETED | OUTPATIENT
Start: 2021-03-05 | End: 2021-03-05

## 2021-03-05 RX ORDER — ONDANSETRON 8 MG/1
4 TABLET, FILM COATED ORAL EVERY 8 HOURS
Refills: 0 | Status: DISCONTINUED | OUTPATIENT
Start: 2021-03-05 | End: 2021-03-05

## 2021-03-05 RX ORDER — MORPHINE SULFATE 50 MG/1
4 CAPSULE, EXTENDED RELEASE ORAL EVERY 4 HOURS
Refills: 0 | Status: DISCONTINUED | OUTPATIENT
Start: 2021-03-05 | End: 2021-03-10

## 2021-03-05 RX ORDER — MORPHINE SULFATE 50 MG/1
2 CAPSULE, EXTENDED RELEASE ORAL EVERY 6 HOURS
Refills: 0 | Status: DISCONTINUED | OUTPATIENT
Start: 2021-03-05 | End: 2021-03-10

## 2021-03-05 RX ORDER — DEXTROSE 50 % IN WATER 50 %
50 SYRINGE (ML) INTRAVENOUS ONCE
Refills: 0 | Status: COMPLETED | OUTPATIENT
Start: 2021-03-05 | End: 2021-03-05

## 2021-03-05 RX ORDER — OXYCODONE AND ACETAMINOPHEN 5; 325 MG/1; MG/1
2 TABLET ORAL EVERY 4 HOURS
Refills: 0 | Status: DISCONTINUED | OUTPATIENT
Start: 2021-03-05 | End: 2021-03-12

## 2021-03-05 RX ORDER — ONDANSETRON 8 MG/1
4 TABLET, FILM COATED ORAL EVERY 6 HOURS
Refills: 0 | Status: DISCONTINUED | OUTPATIENT
Start: 2021-03-05 | End: 2021-03-12

## 2021-03-05 RX ADMIN — SODIUM CHLORIDE 100 MILLILITER(S): 9 INJECTION, SOLUTION INTRAVENOUS at 06:03

## 2021-03-05 RX ADMIN — MORPHINE SULFATE 2 MILLIGRAM(S): 50 CAPSULE, EXTENDED RELEASE ORAL at 09:35

## 2021-03-05 RX ADMIN — Medication 150 MILLIGRAM(S): at 06:00

## 2021-03-05 RX ADMIN — MORPHINE SULFATE 2 MILLIGRAM(S): 50 CAPSULE, EXTENDED RELEASE ORAL at 17:01

## 2021-03-05 RX ADMIN — Medication 10 MILLIGRAM(S): at 06:00

## 2021-03-05 RX ADMIN — SIMETHICONE 80 MILLIGRAM(S): 80 TABLET, CHEWABLE ORAL at 11:30

## 2021-03-05 RX ADMIN — ONDANSETRON 4 MILLIGRAM(S): 8 TABLET, FILM COATED ORAL at 15:00

## 2021-03-05 RX ADMIN — Medication 100 MILLIGRAM(S): at 13:28

## 2021-03-05 RX ADMIN — CYCLOBENZAPRINE HYDROCHLORIDE 10 MILLIGRAM(S): 10 TABLET, FILM COATED ORAL at 17:01

## 2021-03-05 RX ADMIN — Medication 200 MILLIGRAM(S): at 17:00

## 2021-03-05 RX ADMIN — Medication 30 MILLILITER(S): at 16:27

## 2021-03-05 RX ADMIN — MORPHINE SULFATE 2 MILLIGRAM(S): 50 CAPSULE, EXTENDED RELEASE ORAL at 12:19

## 2021-03-05 RX ADMIN — Medication 30 MILLILITER(S): at 14:56

## 2021-03-05 RX ADMIN — MORPHINE SULFATE 2 MILLIGRAM(S): 50 CAPSULE, EXTENDED RELEASE ORAL at 01:55

## 2021-03-05 RX ADMIN — Medication 100 MILLIGRAM(S): at 21:52

## 2021-03-05 RX ADMIN — Medication 50 MILLILITER(S): at 08:27

## 2021-03-05 RX ADMIN — ENOXAPARIN SODIUM 40 MILLIGRAM(S): 100 INJECTION SUBCUTANEOUS at 13:29

## 2021-03-05 RX ADMIN — Medication 150 MILLIGRAM(S): at 17:00

## 2021-03-05 RX ADMIN — ONDANSETRON 4 MILLIGRAM(S): 8 TABLET, FILM COATED ORAL at 08:27

## 2021-03-05 RX ADMIN — MORPHINE SULFATE 2 MILLIGRAM(S): 50 CAPSULE, EXTENDED RELEASE ORAL at 08:11

## 2021-03-05 RX ADMIN — Medication 30 MILLILITER(S): at 20:41

## 2021-03-05 RX ADMIN — Medication 100 MILLIGRAM(S): at 06:02

## 2021-03-05 RX ADMIN — Medication 2 MILLIGRAM(S): at 21:52

## 2021-03-05 RX ADMIN — SERTRALINE 50 MILLIGRAM(S): 25 TABLET, FILM COATED ORAL at 14:24

## 2021-03-05 RX ADMIN — MORPHINE SULFATE 2 MILLIGRAM(S): 50 CAPSULE, EXTENDED RELEASE ORAL at 12:49

## 2021-03-05 RX ADMIN — MORPHINE SULFATE 2 MILLIGRAM(S): 50 CAPSULE, EXTENDED RELEASE ORAL at 02:35

## 2021-03-05 RX ADMIN — PANTOPRAZOLE SODIUM 40 MILLIGRAM(S): 20 TABLET, DELAYED RELEASE ORAL at 13:29

## 2021-03-05 RX ADMIN — SIMETHICONE 80 MILLIGRAM(S): 80 TABLET, CHEWABLE ORAL at 14:31

## 2021-03-05 RX ADMIN — Medication 200 MILLIGRAM(S): at 06:01

## 2021-03-05 RX ADMIN — MORPHINE SULFATE 2 MILLIGRAM(S): 50 CAPSULE, EXTENDED RELEASE ORAL at 17:31

## 2021-03-05 RX ADMIN — CYCLOBENZAPRINE HYDROCHLORIDE 10 MILLIGRAM(S): 10 TABLET, FILM COATED ORAL at 21:52

## 2021-03-05 RX ADMIN — MORPHINE SULFATE 4 MILLIGRAM(S): 50 CAPSULE, EXTENDED RELEASE ORAL at 20:40

## 2021-03-05 NOTE — BEHAVIORAL HEALTH ASSESSMENT NOTE - SUMMARY
Bipolar Disorder in remission with no current mood symptoms  - no acute psych sxs/issues  - has capacity to make her medical decision

## 2021-03-05 NOTE — BEHAVIORAL HEALTH ASSESSMENT NOTE - NSBHCHARTREVIEWLAB_PSY_A_CORE FT
03-05    139  |  105  |  13  ----------------------------<  76  3.6   |  28  |  0.73    Ca    8.3<L>      05 Mar 2021 06:16    TPro  8.1  /  Alb  3.2<L>  /  TBili  0.4  /  DBili  x   /  AST  23  /  ALT  29  /  AlkPhos  139<H>  03-04

## 2021-03-05 NOTE — BEHAVIORAL HEALTH ASSESSMENT NOTE - NSBHCHARTREVIEWVS_PSY_A_CORE FT
T(C): 36.7 (03-05-21 @ 12:00), Max: 36.7 (03-05-21 @ 12:00)  HR: 57 (03-05-21 @ 12:00) (57 - 63)  BP: 115/70 (03-05-21 @ 12:00) (115/70 - 128/72)  RR: 18 (03-05-21 @ 12:00) (18 - 18)  SpO2: 98% (03-05-21 @ 12:00) (98% - 100%)

## 2021-03-05 NOTE — BEHAVIORAL HEALTH ASSESSMENT NOTE - NSBHCHARTREVIEWIMAGING_PSY_A_CORE FT
CT Abdomen and Pelvis w/ IV Cont (03.04.21) Diarrheal state. Likely mild nonspecific enterocolitis. No obstructive pathology

## 2021-03-05 NOTE — BEHAVIORAL HEALTH ASSESSMENT NOTE - VIOLENCE PROTECTIVE FACTORS:
Residential stability/Relationship stability/Sobriety/Affective Stability/Insight into violence risk and need for management/treatment/Good treatment response/compliance

## 2021-03-05 NOTE — BEHAVIORAL HEALTH ASSESSMENT NOTE - SUICIDE PROTECTIVE FACTORS
Responsibility to family and others/Identifies reasons for living/Has future plans/Supportive social network of family or friends/Fear of death or the actual act of killing self/Cultural, spiritual and/or moral attitudes against suicide/Ability to cope with stress/Frustration tolerance/Sabianism beliefs

## 2021-03-05 NOTE — PROGRESS NOTE ADULT - ASSESSMENT
50 year old female PMH of DM, HTN, IBS, PSH of cholecystectomy hx presenting to ED due to abdominal pain, severe, nausea/vomiting and diarrhea since yesterday. Noted profuse vomiting and diarrhea which has been very active - to the point of using adult diapers at home. No fevers or abdominal pain.     GI: Likely exacerbation of Irritable bowl syndrome. Responds  well to IVF, IV ABX for brief course. CT shows no SBO, mild colitis.   May need GI eval in AM. Start diabetic full liquid diet.  IV PPI and Simethicone tid. Add Immodium PRN.      DM:  Will decrease insulin coverage at home to Lantus 30, Ademolog 10 units tid. A1C in AM. Continue Lyrica for LE neuropathy at 150 mg bid.     CV: Continue Vasotec 10 mg bid at home do

## 2021-03-05 NOTE — BEHAVIORAL HEALTH ASSESSMENT NOTE - HPI (INCLUDE ILLNESS QUALITY, SEVERITY, DURATION, TIMING, CONTEXT, MODIFYING FACTORS, ASSOCIATED SIGNS AND SYMPTOMS)
PATIENT LAST SEEN BY PSYCHIATRY ON 1/17/21; 49 YO F, single, noncaregiver, domiciled, 4 grandchildren she is very close with, with hx of Bipolar Disorder, 1 prior psychiatric hospitalization, 2 remote suicide attempts, who fell out of psychiatric care in June 2020 during the pandemic due to a change in her insurance coverage; then was on Abilify and Zoloft; 1 nonserious superficial self-injurious behavior 01/2021 (trigger: pandemic isolation; argument with daughter; seen by [facility] in [facility] on 1/17/21 and was a discharged with outpatient referrals to follow jup with), who was admitted to  yesterday for abdominal pain, severe, nausea/vomiting and diarrhea x 1 day. Noted profuse vomiting and diarrhea which has been very active - to the point of using adult diapers at home. PATIENT LAST SEEN BY PSYCHIATRY ON 1/17/21; 51 YO F, single, noncaregiver, domiciled, 4 grandchildren she is very close with, with hx of Bipolar Disorder, 1 prior psychiatric hospitalization, 2 remote suicide attempts, who fell out of psychiatric care in June 2020 during the pandemic due to a change in her insurance coverage; then was on Abilify and Zoloft; 1 nonserious superficial self-injurious behavior 01/2021 (trigger: pandemic isolation; argument with daughter; seen by [facility] in [facility] on 1/17/21 and was a discharged with outpatient referrals to follow jup with), who was admitted to  yesterday for abdominal pain, severe, nausea/vomiting and diarrhea x 1 day. Noted profuse vomiting and diarrhea which has been very active - to the point of using adult diapers at home.    EXAM: Patient is sitting on the bed with a plastic bin into which she is retching, heaving and occasionally spitting up . She reports severe nausea. She recently took the Zoloft (her OB started her on it because she had intense hot flashes when she started to be perimenopausal; it has been helping) but she feels like it made the nausea worst and asked if it can be held for now. Reports last mood episode > 1 year ago. Patient at this time Endorses stable euthymic mood, regular sleep / appetite / energy level / concentration / bathroom habits. Denies any symptoms of hypomania/patti/psychosis/major depression/ anxiety/panic. Denies any active or passive suicidal or homicidal ideation. Names protective factors (romeo; family; hope for future). Endorses medication compliance. Denies adverse medication side effects, Denies substance use.

## 2021-03-06 DIAGNOSIS — E11.9 TYPE 2 DIABETES MELLITUS WITHOUT COMPLICATIONS: ICD-10-CM

## 2021-03-06 DIAGNOSIS — I10 ESSENTIAL (PRIMARY) HYPERTENSION: ICD-10-CM

## 2021-03-06 DIAGNOSIS — K58.2 MIXED IRRITABLE BOWEL SYNDROME: ICD-10-CM

## 2021-03-06 DIAGNOSIS — K52.9 NONINFECTIVE GASTROENTERITIS AND COLITIS, UNSPECIFIED: ICD-10-CM

## 2021-03-06 DIAGNOSIS — E66.01 MORBID (SEVERE) OBESITY DUE TO EXCESS CALORIES: ICD-10-CM

## 2021-03-06 LAB
ALBUMIN SERPL ELPH-MCNC: 2.9 G/DL — LOW (ref 3.3–5)
ALP SERPL-CCNC: 145 U/L — HIGH (ref 40–120)
ALT FLD-CCNC: 44 U/L — SIGNIFICANT CHANGE UP (ref 12–78)
ANION GAP SERPL CALC-SCNC: 6 MMOL/L — SIGNIFICANT CHANGE UP (ref 5–17)
AST SERPL-CCNC: 37 U/L — SIGNIFICANT CHANGE UP (ref 15–37)
BILIRUB SERPL-MCNC: 0.5 MG/DL — SIGNIFICANT CHANGE UP (ref 0.2–1.2)
BUN SERPL-MCNC: 11 MG/DL — SIGNIFICANT CHANGE UP (ref 7–23)
CALCIUM SERPL-MCNC: 8.6 MG/DL — SIGNIFICANT CHANGE UP (ref 8.5–10.1)
CHLORIDE SERPL-SCNC: 107 MMOL/L — SIGNIFICANT CHANGE UP (ref 96–108)
CO2 SERPL-SCNC: 28 MMOL/L — SIGNIFICANT CHANGE UP (ref 22–31)
CREAT SERPL-MCNC: 0.77 MG/DL — SIGNIFICANT CHANGE UP (ref 0.5–1.3)
CULTURE RESULTS: SIGNIFICANT CHANGE UP
GLUCOSE BLDC GLUCOMTR-MCNC: 100 MG/DL — HIGH (ref 70–99)
GLUCOSE BLDC GLUCOMTR-MCNC: 120 MG/DL — HIGH (ref 70–99)
GLUCOSE BLDC GLUCOMTR-MCNC: 153 MG/DL — HIGH (ref 70–99)
GLUCOSE BLDC GLUCOMTR-MCNC: 97 MG/DL — SIGNIFICANT CHANGE UP (ref 70–99)
GLUCOSE SERPL-MCNC: 103 MG/DL — HIGH (ref 70–99)
HCT VFR BLD CALC: 34.6 % — SIGNIFICANT CHANGE UP (ref 34.5–45)
HGB BLD-MCNC: 11.4 G/DL — LOW (ref 11.5–15.5)
MAGNESIUM SERPL-MCNC: 1.6 MG/DL — SIGNIFICANT CHANGE UP (ref 1.6–2.6)
MCHC RBC-ENTMCNC: 29.8 PG — SIGNIFICANT CHANGE UP (ref 27–34)
MCHC RBC-ENTMCNC: 32.9 GM/DL — SIGNIFICANT CHANGE UP (ref 32–36)
MCV RBC AUTO: 90.3 FL — SIGNIFICANT CHANGE UP (ref 80–100)
NRBC # BLD: 0 /100 WBCS — SIGNIFICANT CHANGE UP (ref 0–0)
PHOSPHATE SERPL-MCNC: 3.7 MG/DL — SIGNIFICANT CHANGE UP (ref 2.5–4.5)
PLATELET # BLD AUTO: 425 K/UL — HIGH (ref 150–400)
POTASSIUM SERPL-MCNC: 3.6 MMOL/L — SIGNIFICANT CHANGE UP (ref 3.5–5.3)
POTASSIUM SERPL-SCNC: 3.6 MMOL/L — SIGNIFICANT CHANGE UP (ref 3.5–5.3)
PROT SERPL-MCNC: 7.3 GM/DL — SIGNIFICANT CHANGE UP (ref 6–8.3)
RBC # BLD: 3.83 M/UL — SIGNIFICANT CHANGE UP (ref 3.8–5.2)
RBC # FLD: 12.9 % — SIGNIFICANT CHANGE UP (ref 10.3–14.5)
SODIUM SERPL-SCNC: 141 MMOL/L — SIGNIFICANT CHANGE UP (ref 135–145)
SPECIMEN SOURCE: SIGNIFICANT CHANGE UP
WBC # BLD: 8.39 K/UL — SIGNIFICANT CHANGE UP (ref 3.8–10.5)
WBC # FLD AUTO: 8.39 K/UL — SIGNIFICANT CHANGE UP (ref 3.8–10.5)

## 2021-03-06 PROCEDURE — 99233 SBSQ HOSP IP/OBS HIGH 50: CPT

## 2021-03-06 RX ORDER — FUROSEMIDE 40 MG
40 TABLET ORAL ONCE
Refills: 0 | Status: COMPLETED | OUTPATIENT
Start: 2021-03-06 | End: 2021-03-06

## 2021-03-06 RX ORDER — SODIUM CHLORIDE 0.65 %
1 AEROSOL, SPRAY (ML) NASAL THREE TIMES A DAY
Refills: 0 | Status: DISCONTINUED | OUTPATIENT
Start: 2021-03-06 | End: 2021-03-06

## 2021-03-06 RX ADMIN — MORPHINE SULFATE 4 MILLIGRAM(S): 50 CAPSULE, EXTENDED RELEASE ORAL at 02:50

## 2021-03-06 RX ADMIN — Medication 150 MILLIGRAM(S): at 17:46

## 2021-03-06 RX ADMIN — Medication 200 MILLIGRAM(S): at 05:31

## 2021-03-06 RX ADMIN — OXYCODONE AND ACETAMINOPHEN 2 TABLET(S): 5; 325 TABLET ORAL at 06:30

## 2021-03-06 RX ADMIN — SIMETHICONE 80 MILLIGRAM(S): 80 TABLET, CHEWABLE ORAL at 02:50

## 2021-03-06 RX ADMIN — OXYCODONE AND ACETAMINOPHEN 2 TABLET(S): 5; 325 TABLET ORAL at 12:11

## 2021-03-06 RX ADMIN — CYCLOBENZAPRINE HYDROCHLORIDE 10 MILLIGRAM(S): 10 TABLET, FILM COATED ORAL at 21:39

## 2021-03-06 RX ADMIN — MORPHINE SULFATE 4 MILLIGRAM(S): 50 CAPSULE, EXTENDED RELEASE ORAL at 00:18

## 2021-03-06 RX ADMIN — ONDANSETRON 4 MILLIGRAM(S): 8 TABLET, FILM COATED ORAL at 02:29

## 2021-03-06 RX ADMIN — Medication 100 MILLIGRAM(S): at 21:38

## 2021-03-06 RX ADMIN — Medication 30 MILLILITER(S): at 05:38

## 2021-03-06 RX ADMIN — OXYCODONE AND ACETAMINOPHEN 2 TABLET(S): 5; 325 TABLET ORAL at 20:02

## 2021-03-06 RX ADMIN — PANTOPRAZOLE SODIUM 40 MILLIGRAM(S): 20 TABLET, DELAYED RELEASE ORAL at 12:27

## 2021-03-06 RX ADMIN — MORPHINE SULFATE 4 MILLIGRAM(S): 50 CAPSULE, EXTENDED RELEASE ORAL at 03:20

## 2021-03-06 RX ADMIN — MORPHINE SULFATE 4 MILLIGRAM(S): 50 CAPSULE, EXTENDED RELEASE ORAL at 15:48

## 2021-03-06 RX ADMIN — Medication 10 UNIT(S): at 08:17

## 2021-03-06 RX ADMIN — Medication 100 MILLIGRAM(S): at 05:31

## 2021-03-06 RX ADMIN — Medication 200 MILLIGRAM(S): at 17:46

## 2021-03-06 RX ADMIN — OXYCODONE AND ACETAMINOPHEN 2 TABLET(S): 5; 325 TABLET ORAL at 05:38

## 2021-03-06 RX ADMIN — MORPHINE SULFATE 4 MILLIGRAM(S): 50 CAPSULE, EXTENDED RELEASE ORAL at 16:10

## 2021-03-06 RX ADMIN — Medication 100 MILLIGRAM(S): at 13:59

## 2021-03-06 RX ADMIN — Medication 2 MILLIGRAM(S): at 02:50

## 2021-03-06 RX ADMIN — SODIUM CHLORIDE 100 MILLILITER(S): 9 INJECTION, SOLUTION INTRAVENOUS at 12:55

## 2021-03-06 RX ADMIN — Medication 40 MILLIGRAM(S): at 15:48

## 2021-03-06 RX ADMIN — OXYCODONE AND ACETAMINOPHEN 2 TABLET(S): 5; 325 TABLET ORAL at 11:27

## 2021-03-06 RX ADMIN — ENOXAPARIN SODIUM 40 MILLIGRAM(S): 100 INJECTION SUBCUTANEOUS at 12:27

## 2021-03-06 RX ADMIN — OXYCODONE AND ACETAMINOPHEN 2 TABLET(S): 5; 325 TABLET ORAL at 20:32

## 2021-03-06 NOTE — PROGRESS NOTE ADULT - ASSESSMENT
50 year old female PMH of DM, HTN, IBS, PSH of cholecystectomy hx presenting to ED due to abdominal pain, severe, nausea/vomiting and diarrhea since yesterday. Noted profuse vomiting and diarrhea which has been very active - to the point of using adult diapers at home. No fevers or abdominal pain.     GI: Likely exacerbation of Irritable bowl syndrome. Responds  well to IVF, IV ABX for brief course. CT shows no SBO, mild colitis.   May need GI eval in AM. regular diet .  IV PPI and Simethicone tid. Add Immodium PRN.      DM:   insulin coverage at home to Lantus 30, Ademolog 10 units tid. A1C in AM. Continue Lyrica for LE neuropathy at 150 mg bid.     CV: Continue Vasotec 10 mg bid at home do

## 2021-03-07 LAB
ALBUMIN SERPL ELPH-MCNC: 2.9 G/DL — LOW (ref 3.3–5)
ALP SERPL-CCNC: 138 U/L — HIGH (ref 40–120)
ALT FLD-CCNC: 44 U/L — SIGNIFICANT CHANGE UP (ref 12–78)
ANION GAP SERPL CALC-SCNC: 7 MMOL/L — SIGNIFICANT CHANGE UP (ref 5–17)
AST SERPL-CCNC: 36 U/L — SIGNIFICANT CHANGE UP (ref 15–37)
BILIRUB SERPL-MCNC: 0.4 MG/DL — SIGNIFICANT CHANGE UP (ref 0.2–1.2)
BUN SERPL-MCNC: 10 MG/DL — SIGNIFICANT CHANGE UP (ref 7–23)
CALCIUM SERPL-MCNC: 8.4 MG/DL — LOW (ref 8.5–10.1)
CHLORIDE SERPL-SCNC: 102 MMOL/L — SIGNIFICANT CHANGE UP (ref 96–108)
CO2 SERPL-SCNC: 28 MMOL/L — SIGNIFICANT CHANGE UP (ref 22–31)
CREAT SERPL-MCNC: 0.82 MG/DL — SIGNIFICANT CHANGE UP (ref 0.5–1.3)
GLUCOSE BLDC GLUCOMTR-MCNC: 109 MG/DL — HIGH (ref 70–99)
GLUCOSE BLDC GLUCOMTR-MCNC: 125 MG/DL — HIGH (ref 70–99)
GLUCOSE BLDC GLUCOMTR-MCNC: 139 MG/DL — HIGH (ref 70–99)
GLUCOSE BLDC GLUCOMTR-MCNC: 161 MG/DL — HIGH (ref 70–99)
GLUCOSE SERPL-MCNC: 128 MG/DL — HIGH (ref 70–99)
HCT VFR BLD CALC: 36.4 % — SIGNIFICANT CHANGE UP (ref 34.5–45)
HGB BLD-MCNC: 11.6 G/DL — SIGNIFICANT CHANGE UP (ref 11.5–15.5)
MAGNESIUM SERPL-MCNC: 1.8 MG/DL — SIGNIFICANT CHANGE UP (ref 1.6–2.6)
MCHC RBC-ENTMCNC: 29.4 PG — SIGNIFICANT CHANGE UP (ref 27–34)
MCHC RBC-ENTMCNC: 31.9 GM/DL — LOW (ref 32–36)
MCV RBC AUTO: 92.2 FL — SIGNIFICANT CHANGE UP (ref 80–100)
NRBC # BLD: 0 /100 WBCS — SIGNIFICANT CHANGE UP (ref 0–0)
PHOSPHATE SERPL-MCNC: 3.6 MG/DL — SIGNIFICANT CHANGE UP (ref 2.5–4.5)
PLATELET # BLD AUTO: 407 K/UL — HIGH (ref 150–400)
POTASSIUM SERPL-MCNC: 3.7 MMOL/L — SIGNIFICANT CHANGE UP (ref 3.5–5.3)
POTASSIUM SERPL-SCNC: 3.7 MMOL/L — SIGNIFICANT CHANGE UP (ref 3.5–5.3)
PROT SERPL-MCNC: 7.2 GM/DL — SIGNIFICANT CHANGE UP (ref 6–8.3)
RBC # BLD: 3.95 M/UL — SIGNIFICANT CHANGE UP (ref 3.8–5.2)
RBC # FLD: 12.9 % — SIGNIFICANT CHANGE UP (ref 10.3–14.5)
SODIUM SERPL-SCNC: 137 MMOL/L — SIGNIFICANT CHANGE UP (ref 135–145)
WBC # BLD: 7.25 K/UL — SIGNIFICANT CHANGE UP (ref 3.8–10.5)
WBC # FLD AUTO: 7.25 K/UL — SIGNIFICANT CHANGE UP (ref 3.8–10.5)

## 2021-03-07 PROCEDURE — 99233 SBSQ HOSP IP/OBS HIGH 50: CPT

## 2021-03-07 PROCEDURE — 93971 EXTREMITY STUDY: CPT | Mod: 26,RT

## 2021-03-07 PROCEDURE — 73630 X-RAY EXAM OF FOOT: CPT | Mod: 26,RT

## 2021-03-07 RX ORDER — FUROSEMIDE 40 MG
40 TABLET ORAL DAILY
Refills: 0 | Status: DISCONTINUED | OUTPATIENT
Start: 2021-03-07 | End: 2021-03-12

## 2021-03-07 RX ADMIN — Medication 150 MILLIGRAM(S): at 05:45

## 2021-03-07 RX ADMIN — ENOXAPARIN SODIUM 40 MILLIGRAM(S): 100 INJECTION SUBCUTANEOUS at 11:46

## 2021-03-07 RX ADMIN — Medication 10 UNIT(S): at 07:51

## 2021-03-07 RX ADMIN — MORPHINE SULFATE 2 MILLIGRAM(S): 50 CAPSULE, EXTENDED RELEASE ORAL at 06:44

## 2021-03-07 RX ADMIN — OXYCODONE AND ACETAMINOPHEN 2 TABLET(S): 5; 325 TABLET ORAL at 07:14

## 2021-03-07 RX ADMIN — INSULIN GLARGINE 30 UNIT(S): 100 INJECTION, SOLUTION SUBCUTANEOUS at 21:46

## 2021-03-07 RX ADMIN — MORPHINE SULFATE 4 MILLIGRAM(S): 50 CAPSULE, EXTENDED RELEASE ORAL at 03:28

## 2021-03-07 RX ADMIN — Medication 200 MILLIGRAM(S): at 17:12

## 2021-03-07 RX ADMIN — MORPHINE SULFATE 4 MILLIGRAM(S): 50 CAPSULE, EXTENDED RELEASE ORAL at 22:17

## 2021-03-07 RX ADMIN — Medication 100 MILLIGRAM(S): at 21:47

## 2021-03-07 RX ADMIN — MORPHINE SULFATE 2 MILLIGRAM(S): 50 CAPSULE, EXTENDED RELEASE ORAL at 15:36

## 2021-03-07 RX ADMIN — PANTOPRAZOLE SODIUM 40 MILLIGRAM(S): 20 TABLET, DELAYED RELEASE ORAL at 11:46

## 2021-03-07 RX ADMIN — Medication 150 MILLIGRAM(S): at 17:11

## 2021-03-07 RX ADMIN — MORPHINE SULFATE 4 MILLIGRAM(S): 50 CAPSULE, EXTENDED RELEASE ORAL at 11:44

## 2021-03-07 RX ADMIN — ONDANSETRON 4 MILLIGRAM(S): 8 TABLET, FILM COATED ORAL at 07:42

## 2021-03-07 RX ADMIN — MORPHINE SULFATE 4 MILLIGRAM(S): 50 CAPSULE, EXTENDED RELEASE ORAL at 21:47

## 2021-03-07 RX ADMIN — OXYCODONE AND ACETAMINOPHEN 2 TABLET(S): 5; 325 TABLET ORAL at 06:44

## 2021-03-07 RX ADMIN — OXYCODONE AND ACETAMINOPHEN 2 TABLET(S): 5; 325 TABLET ORAL at 20:48

## 2021-03-07 RX ADMIN — MORPHINE SULFATE 2 MILLIGRAM(S): 50 CAPSULE, EXTENDED RELEASE ORAL at 05:52

## 2021-03-07 RX ADMIN — OXYCODONE AND ACETAMINOPHEN 2 TABLET(S): 5; 325 TABLET ORAL at 19:48

## 2021-03-07 RX ADMIN — Medication 10 UNIT(S): at 17:12

## 2021-03-07 RX ADMIN — MORPHINE SULFATE 4 MILLIGRAM(S): 50 CAPSULE, EXTENDED RELEASE ORAL at 13:20

## 2021-03-07 RX ADMIN — Medication 10 MILLIGRAM(S): at 05:45

## 2021-03-07 RX ADMIN — Medication 100 MILLIGRAM(S): at 06:44

## 2021-03-07 RX ADMIN — MORPHINE SULFATE 2 MILLIGRAM(S): 50 CAPSULE, EXTENDED RELEASE ORAL at 16:00

## 2021-03-07 RX ADMIN — Medication 10 UNIT(S): at 11:49

## 2021-03-07 RX ADMIN — Medication 100 MILLIGRAM(S): at 13:25

## 2021-03-07 RX ADMIN — MORPHINE SULFATE 4 MILLIGRAM(S): 50 CAPSULE, EXTENDED RELEASE ORAL at 02:58

## 2021-03-07 RX ADMIN — Medication 200 MILLIGRAM(S): at 05:44

## 2021-03-07 NOTE — PROGRESS NOTE ADULT - ASSESSMENT
50 year old female PMH of DM, HTN, IBS, PSH of cholecystectomy hx presenting to ED due to abdominal pain, severe, nausea/vomiting and diarrhea since yesterday. Noted profuse vomiting and diarrhea which has been very active - to the point of using adult diapers at home. No fevers or abdominal pain.     GI: Likely exacerbation of Irritable bowl syndrome. Responds  well to IVF, IV ABX for brief course. CT shows no SBO, mild colitis.   May need GI eval in AM. regular diet .  IV PPI and Simethicone tid. Add Immodium PRN.      DM:   insulin coverage at home to Lantus 30, Ademolog 10 units tid. A1C in AM. Continue Lyrica for LE neuropathy at 150 mg bid.     CV: Continue Vasotec 10 mg bid at home do    -Foot pain will check MRI will contact podiatry DVT study negative

## 2021-03-08 LAB
ANION GAP SERPL CALC-SCNC: 6 MMOL/L — SIGNIFICANT CHANGE UP (ref 5–17)
BUN SERPL-MCNC: 14 MG/DL — SIGNIFICANT CHANGE UP (ref 7–23)
CALCIUM SERPL-MCNC: 8.3 MG/DL — LOW (ref 8.5–10.1)
CHLORIDE SERPL-SCNC: 102 MMOL/L — SIGNIFICANT CHANGE UP (ref 96–108)
CO2 SERPL-SCNC: 29 MMOL/L — SIGNIFICANT CHANGE UP (ref 22–31)
CREAT SERPL-MCNC: 0.86 MG/DL — SIGNIFICANT CHANGE UP (ref 0.5–1.3)
GLUCOSE BLDC GLUCOMTR-MCNC: 112 MG/DL — HIGH (ref 70–99)
GLUCOSE BLDC GLUCOMTR-MCNC: 118 MG/DL — HIGH (ref 70–99)
GLUCOSE BLDC GLUCOMTR-MCNC: 121 MG/DL — HIGH (ref 70–99)
GLUCOSE BLDC GLUCOMTR-MCNC: 135 MG/DL — HIGH (ref 70–99)
GLUCOSE SERPL-MCNC: 147 MG/DL — HIGH (ref 70–99)
HCT VFR BLD CALC: 33.8 % — LOW (ref 34.5–45)
HGB BLD-MCNC: 10.8 G/DL — LOW (ref 11.5–15.5)
MAGNESIUM SERPL-MCNC: 1.8 MG/DL — SIGNIFICANT CHANGE UP (ref 1.6–2.6)
MCHC RBC-ENTMCNC: 29.6 PG — SIGNIFICANT CHANGE UP (ref 27–34)
MCHC RBC-ENTMCNC: 32 GM/DL — SIGNIFICANT CHANGE UP (ref 32–36)
MCV RBC AUTO: 92.6 FL — SIGNIFICANT CHANGE UP (ref 80–100)
NRBC # BLD: 0 /100 WBCS — SIGNIFICANT CHANGE UP (ref 0–0)
PHOSPHATE SERPL-MCNC: 3.7 MG/DL — SIGNIFICANT CHANGE UP (ref 2.5–4.5)
PLATELET # BLD AUTO: 387 K/UL — SIGNIFICANT CHANGE UP (ref 150–400)
POTASSIUM SERPL-MCNC: 3.8 MMOL/L — SIGNIFICANT CHANGE UP (ref 3.5–5.3)
POTASSIUM SERPL-SCNC: 3.8 MMOL/L — SIGNIFICANT CHANGE UP (ref 3.5–5.3)
RBC # BLD: 3.65 M/UL — LOW (ref 3.8–5.2)
RBC # FLD: 13 % — SIGNIFICANT CHANGE UP (ref 10.3–14.5)
SODIUM SERPL-SCNC: 137 MMOL/L — SIGNIFICANT CHANGE UP (ref 135–145)
WBC # BLD: 8.71 K/UL — SIGNIFICANT CHANGE UP (ref 3.8–10.5)
WBC # FLD AUTO: 8.71 K/UL — SIGNIFICANT CHANGE UP (ref 3.8–10.5)

## 2021-03-08 PROCEDURE — 73720 MRI LWR EXTREMITY W/O&W/DYE: CPT | Mod: 26,RT

## 2021-03-08 PROCEDURE — 99231 SBSQ HOSP IP/OBS SF/LOW 25: CPT

## 2021-03-08 PROCEDURE — 99233 SBSQ HOSP IP/OBS HIGH 50: CPT

## 2021-03-08 RX ADMIN — OXYCODONE AND ACETAMINOPHEN 2 TABLET(S): 5; 325 TABLET ORAL at 16:44

## 2021-03-08 RX ADMIN — OXYCODONE AND ACETAMINOPHEN 2 TABLET(S): 5; 325 TABLET ORAL at 17:44

## 2021-03-08 RX ADMIN — Medication 200 MILLIGRAM(S): at 18:42

## 2021-03-08 RX ADMIN — SIMETHICONE 80 MILLIGRAM(S): 80 TABLET, CHEWABLE ORAL at 03:49

## 2021-03-08 RX ADMIN — OXYCODONE AND ACETAMINOPHEN 2 TABLET(S): 5; 325 TABLET ORAL at 09:49

## 2021-03-08 RX ADMIN — MORPHINE SULFATE 2 MILLIGRAM(S): 50 CAPSULE, EXTENDED RELEASE ORAL at 18:57

## 2021-03-08 RX ADMIN — Medication 150 MILLIGRAM(S): at 05:49

## 2021-03-08 RX ADMIN — Medication 150 MILLIGRAM(S): at 18:42

## 2021-03-08 RX ADMIN — MORPHINE SULFATE 2 MILLIGRAM(S): 50 CAPSULE, EXTENDED RELEASE ORAL at 18:42

## 2021-03-08 RX ADMIN — Medication 200 MILLIGRAM(S): at 05:06

## 2021-03-08 RX ADMIN — Medication 10 UNIT(S): at 16:56

## 2021-03-08 RX ADMIN — ENOXAPARIN SODIUM 40 MILLIGRAM(S): 100 INJECTION SUBCUTANEOUS at 13:32

## 2021-03-08 RX ADMIN — CYCLOBENZAPRINE HYDROCHLORIDE 10 MILLIGRAM(S): 10 TABLET, FILM COATED ORAL at 21:16

## 2021-03-08 RX ADMIN — Medication 100 MILLIGRAM(S): at 21:17

## 2021-03-08 RX ADMIN — Medication 10 UNIT(S): at 08:29

## 2021-03-08 RX ADMIN — MORPHINE SULFATE 2 MILLIGRAM(S): 50 CAPSULE, EXTENDED RELEASE ORAL at 04:19

## 2021-03-08 RX ADMIN — MORPHINE SULFATE 4 MILLIGRAM(S): 50 CAPSULE, EXTENDED RELEASE ORAL at 22:00

## 2021-03-08 RX ADMIN — OXYCODONE AND ACETAMINOPHEN 2 TABLET(S): 5; 325 TABLET ORAL at 10:49

## 2021-03-08 RX ADMIN — SIMETHICONE 80 MILLIGRAM(S): 80 TABLET, CHEWABLE ORAL at 15:24

## 2021-03-08 RX ADMIN — Medication 40 MILLIGRAM(S): at 05:49

## 2021-03-08 RX ADMIN — INSULIN GLARGINE 30 UNIT(S): 100 INJECTION, SOLUTION SUBCUTANEOUS at 21:24

## 2021-03-08 RX ADMIN — MORPHINE SULFATE 4 MILLIGRAM(S): 50 CAPSULE, EXTENDED RELEASE ORAL at 22:15

## 2021-03-08 RX ADMIN — Medication 10 UNIT(S): at 13:32

## 2021-03-08 RX ADMIN — Medication 100 MILLIGRAM(S): at 15:24

## 2021-03-08 RX ADMIN — Medication 10 MILLIGRAM(S): at 05:49

## 2021-03-08 RX ADMIN — PANTOPRAZOLE SODIUM 40 MILLIGRAM(S): 20 TABLET, DELAYED RELEASE ORAL at 13:32

## 2021-03-08 RX ADMIN — Medication 100 MILLIGRAM(S): at 05:08

## 2021-03-08 RX ADMIN — MORPHINE SULFATE 2 MILLIGRAM(S): 50 CAPSULE, EXTENDED RELEASE ORAL at 03:49

## 2021-03-08 NOTE — CONSULT NOTE ADULT - SUBJECTIVE AND OBJECTIVE BOX
HPI:  50 year old female PMH of DM, morbid obesity, HTN, IBS, PSH of cholecystectomy hx presenting to ED due to abdominal pain, severe, nausea/vomiting and diarrhea since yesterday. Noted profuse vomiting and diarrhea which has been very active - to the point of using adult diapers at home. No fevers or abdominal pain.     In past admissions she responds well to IVF and IV ABX for few days.  (04 Mar 2021 18:55)  ---------- As Above -------------  The patient presents with a few days of N/V, abdominal pain and diarrhea. She has been having multiple episodes of the above since last year. The patient was to have an EGD  / colonoscopy but was postponed because of Coved. It is non bloody. The patient states that she has been having diarrhea constantly over the past year. A trial off Metformin did not help. A lactose free diet nor Cholestyramine did not help. The patient's daughter has a history of celiac disease.       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12)    History of cholecystectomy        MEDICATIONS  (STANDING):  ciprofloxacin   IVPB 400 milliGRAM(s) IV Intermittent every 12 hours  enalapril 10 milliGRAM(s) Oral daily  enoxaparin Injectable 40 milliGRAM(s) SubCutaneous daily  furosemide    Tablet 40 milliGRAM(s) Oral daily  insulin glargine Injectable (LANTUS) 30 Unit(s) SubCutaneous at bedtime  insulin lispro Injectable (ADMELOG) 10 Unit(s) SubCutaneous three times a day before meals  metroNIDAZOLE  IVPB      metroNIDAZOLE  IVPB 500 milliGRAM(s) IV Intermittent every 8 hours  pantoprazole  Injectable 40 milliGRAM(s) IV Push daily  pregabalin 150 milliGRAM(s) Oral two times a day    MEDICATIONS  (PRN):  aluminum hydroxide/magnesium hydroxide/simethicone Suspension 30 milliLiter(s) Oral every 6 hours PRN Dyspepsia  cyclobenzaprine 10 milliGRAM(s) Oral two times a day PRN Muscle Spasm  loperamide 2 milliGRAM(s) Oral four times a day PRN loose BM  morphine  - Injectable 4 milliGRAM(s) IV Push every 4 hours PRN Severe Pain (7 - 10)  morphine  - Injectable 2 milliGRAM(s) IV Push every 6 hours PRN Moderate Pain (4 - 6)  ondansetron Injectable 4 milliGRAM(s) IV Push every 6 hours PRN Nausea and/or Vomiting  oxycodone    5 mG/acetaminophen 325 mG 2 Tablet(s) Oral every 4 hours PRN Mild Pain (1 - 3)  simethicone 80 milliGRAM(s) Chew three times a day PRN Gas      Allergies    amitriptyline (Other)    Intolerances        FAMILY HISTORY:  No pertinent family history in first degree relatives    No pertinent family history in first degree relatives        REVIEW OF SYSTEMS:    CONSTITUTIONAL: No fever, weight loss, or fatigue  EYES: No eye pain, visual disturbances, or discharge  ENMT:  No difficulty hearing, tinnitus, vertigo; No sinus or throat pain  NECK: No pain or stiffness  BREASTS: No pain, masses, or nipple discharge  RESPIRATORY: No cough, wheezing, chills or hemoptysis; No shortness of breath  CARDIOVASCULAR: No chest pain, palpitations, dizziness, or leg swelling  GASTROINTESTINAL: No abdominal or epigastric pain. No nausea, vomiting, or hematemesis; No diarrhea or constipation. No melena or hematochezia.  GENITOURINARY: No dysuria, frequency, hematuria, or incontinence  NEUROLOGICAL: No headaches, memory loss, loss of strength, numbness, or tremors  SKIN: No itching, burning, rashes, or lesions   LYMPH NODES: No enlarged glands  ENDOCRINE: No heat or cold intolerance; No hair loss  MUSCULOSKELETAL: No joint pain or swelling; No muscle, back, or extremity pain  PSYCHIATRIC: No depression, anxiety, mood swings, or difficulty sleeping  HEME/LYMPH: No easy bruising, or bleeding gums  ALLERGY AND IMMUNOLOGIC: No hives or eczema          SOCIAL HISTORY:    FAMILY HISTORY:  No pertinent family history in first degree relatives    No pertinent family history in first degree relatives        Vital Signs Last 24 Hrs  T(C): 36.5 (08 Mar 2021 17:29), Max: 36.8 (08 Mar 2021 13:49)  T(F): 97.7 (08 Mar 2021 17:29), Max: 98.3 (08 Mar 2021 13:49)  HR: 67 (08 Mar 2021 17:29) (65 - 97)  BP: 119/75 (08 Mar 2021 17:29) (110/65 - 137/68)  BP(mean): --  RR: 18 (08 Mar 2021 17:29) (18 - 19)  SpO2: 97% (08 Mar 2021 17:29) (95% - 97%)    PHYSICAL EXAM:    GENERAL: NAD, well-groomed, well-developed  HEAD:  Atraumatic, Normocephalic  EYES: EOMI, PERRLA, conjunctiva and sclera clear  ENMT: No tonsillar erythema, exudates, or enlargement; Moist mucous membranes, Good dentition, No lesions  NECK: Supple, No JVD, Normal thyroid  NERVOUS SYSTEM:  Alert & Oriented X3, Good concentration; Motor Strength 5/5 B/L upper and lower extremities; DTRs 2+ intact and symmetric  CHEST/LUNG: Clear to percussion bilaterally; No rales, rhonchi, wheezing, or rubs  HEART: Regular rate and rhythm; No murmurs, rubs, or gallops  ABDOMEN: Soft, Nontender, Nondistended; Bowel sounds present  EXTREMITIES:  2+ Peripheral Pulses, No clubbing, cyanosis, or edema  LYMPH: No lymphadenopathy noted   RECTAL: No masses, prostate normal size and smooth, Guaiaci negative   BREAST: No palpable masses, skin no lesions no retractions, no discharges. adnexal no palpable masses noted   GYN: uterus normal size, adnexal, no palpable masses, no CMT, no uterine discharge   SKIN: No rashes or lesions    LABS:                        10.8   8.71  )-----------( 387      ( 08 Mar 2021 06:56 )             33.8       CBC:  03-08 @ 06:56  WBC  8.71  HGB 10.8  HCT 33.8 Plate 387  MCV 92.6  03-07 @ 07:14  WBC  7.25  HGB 11.6  HCT 36.4 Plate 407  MCV 92.2  03-06 @ 07:37  WBC  8.39  HGB 11.4  HCT 34.6 Plate 425  MCV 90.3  03-05 @ 06:16  WBC  10.48  HGB 11.4  HCT 35.6 Plate 431  MCV 91.5  03-04 @ 14:36  WBC  16.52  HGB 12.1  HCT 36.9 Plate 504  MCV 90.2           08 Mar 2021 06:56    137    |  102    |  14     ----------------------------<  147    3.8     |  29     |  0.86   07 Mar 2021 07:14    137    |  102    |  10     ----------------------------<  128    3.7     |  28     |  0.82   06 Mar 2021 07:37    141    |  107    |  11     ----------------------------<  103    3.6     |  28     |  0.77   05 Mar 2021 06:16    139    |  105    |  13     ----------------------------<  76     3.6     |  28     |  0.73   04 Mar 2021 14:36    140    |  106    |  16     ----------------------------<  97     4.4     |  26     |  0.67     Ca    8.3        08 Mar 2021 06:56  Ca    8.4        07 Mar 2021 07:14  Ca    8.6        06 Mar 2021 07:37  Ca    8.3        05 Mar 2021 06:16  Ca    8.8        04 Mar 2021 14:36  Phos  3.7       08 Mar 2021 06:56  Phos  3.6       07 Mar 2021 07:14  Phos  3.7       06 Mar 2021 07:37  Mg     1.8       08 Mar 2021 06:56  Mg     1.8       07 Mar 2021 07:14  Mg     1.6       06 Mar 2021 07:37    TPro  7.2    /  Alb  2.9    /  TBili  0.4    /  DBili  x      /  AST  36     /  ALT  44     /  AlkPhos  138    07 Mar 2021 07:14  TPro  7.3    /  Alb  2.9    /  TBili  0.5    /  DBili  x      /  AST  37     /  ALT  44     /  AlkPhos  145    06 Mar 2021 07:37  TPro  8.1    /  Alb  3.2    /  TBili  0.4    /  DBili  x      /  AST  23     /  ALT  29     /  AlkPhos  139    04 Mar 2021 14:36            RADIOLOGY & ADDITIONAL STUDIES: HPI:  50 year old female PMH of DM, morbid obesity, HTN, IBS, PSH of cholecystectomy hx presenting to ED due to abdominal pain, severe, nausea/vomiting and diarrhea since yesterday. Noted profuse vomiting and diarrhea which has been very active - to the point of using adult diapers at home. No fevers or abdominal pain.     In past admissions she responds well to IVF and IV ABX for few days.  (04 Mar 2021 18:55)  ---------- As Above -------------  The patient presents with a few days of N/V, abdominal pain and diarrhea. She has been having multiple episodes of the above since last year. The patient was to have an EGD  / colonoscopy but was postponed because of Coved. It is non bloody. The patient states that she has been having diarrhea constantly over the past year. A trial off Metformin did not help. A lactose free diet nor Cholestyramine did not help. The patient's daughter has a history of celiac disease. She is on a lactose free diet.  See labs / CT scan      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12)    History of cholecystectomy        MEDICATIONS  (STANDING):  ciprofloxacin   IVPB 400 milliGRAM(s) IV Intermittent every 12 hours  enalapril 10 milliGRAM(s) Oral daily  enoxaparin Injectable 40 milliGRAM(s) SubCutaneous daily  furosemide    Tablet 40 milliGRAM(s) Oral daily  insulin glargine Injectable (LANTUS) 30 Unit(s) SubCutaneous at bedtime  insulin lispro Injectable (ADMELOG) 10 Unit(s) SubCutaneous three times a day before meals  metroNIDAZOLE  IVPB      metroNIDAZOLE  IVPB 500 milliGRAM(s) IV Intermittent every 8 hours  pantoprazole  Injectable 40 milliGRAM(s) IV Push daily  pregabalin 150 milliGRAM(s) Oral two times a day    MEDICATIONS  (PRN):  aluminum hydroxide/magnesium hydroxide/simethicone Suspension 30 milliLiter(s) Oral every 6 hours PRN Dyspepsia  cyclobenzaprine 10 milliGRAM(s) Oral two times a day PRN Muscle Spasm  loperamide 2 milliGRAM(s) Oral four times a day PRN loose BM  morphine  - Injectable 4 milliGRAM(s) IV Push every 4 hours PRN Severe Pain (7 - 10)  morphine  - Injectable 2 milliGRAM(s) IV Push every 6 hours PRN Moderate Pain (4 - 6)  ondansetron Injectable 4 milliGRAM(s) IV Push every 6 hours PRN Nausea and/or Vomiting  oxycodone    5 mG/acetaminophen 325 mG 2 Tablet(s) Oral every 4 hours PRN Mild Pain (1 - 3)  simethicone 80 milliGRAM(s) Chew three times a day PRN Gas      Allergies    amitriptyline (Other)    Intolerances        FAMILY HISTORY:  No pertinent family history in first degree relatives    No pertinent family history in first degree relatives        REVIEW OF SYSTEMS:    CONSTITUTIONAL: No fever, weight loss,   EYES: No eye pain, visual disturbances, or discharge  ENMT:  No difficulty hearing, tinnitus, vertigo; No sinus or throat pain  NECK: No pain or stiffness  BREASTS: No pain, masses, or nipple discharge  RESPIRATORY: No cough, wheezing, chills or hemoptysis; No shortness of breath  CARDIOVASCULAR: No chest pain, palpitations, dizziness, or leg swelling  GASTROINTESTINAL:  see above  GENITOURINARY: No dysuria, frequency, hematuria, or incontinence  NEUROLOGICAL: No headaches, memory loss, loss of strength, numbness, or tremors  SKIN: No itching, burning, rashes, or lesions   LYMPH NODES: No enlarged glands  ENDOCRINE: No heat or cold intolerance; No hair loss  MUSCULOSKELETAL: No joint pain or swelling; No muscle, back, or extremity pain  PSYCHIATRIC: No depression, anxiety, mood swings, or difficulty sleeping  HEME/LYMPH: No easy bruising, or bleeding gums  ALLERGY AND IMMUNOLOGIC: No hives or eczema          SOCIAL HISTORY:    FAMILY HISTORY:  No pertinent family history in first degree relatives    No pertinent family history in first degree relatives        Vital Signs Last 24 Hrs  T(C): 36.5 (08 Mar 2021 17:29), Max: 36.8 (08 Mar 2021 13:49)  T(F): 97.7 (08 Mar 2021 17:29), Max: 98.3 (08 Mar 2021 13:49)  HR: 67 (08 Mar 2021 17:29) (65 - 97)  BP: 119/75 (08 Mar 2021 17:29) (110/65 - 137/68)  BP(mean): --  RR: 18 (08 Mar 2021 17:29) (18 - 19)  SpO2: 97% (08 Mar 2021 17:29) (95% - 97%)    PHYSICAL EXAM:    GENERAL: NAD, well-groomed, well-developed  HEAD:  Atraumatic, Normocephalic  EYES: EOMI, PERRLA, conjunctiva and sclera clear  NECK: Supple, No JVD, Normal thyroid  NERVOUS SYSTEM:  Alert & Oriented X3, Good concentration; Motor Strength 5/5 B/L upper and lower extremities;   CHEST/LUNG: Clear to percussion bilaterally; No rales, rhonchi, wheezing, or rubs  HEART: Regular rate and rhythm; No murmurs, rubs, or gallops  ABDOMEN: Soft, Nontender, Nondistended; Bowel sounds present  EXTREMITIES:  2+ Peripheral Pulses, No clubbing, cyanosis, or edema  LYMPH: No lymphadenopathy noted   RECTAL: Deferred   SKIN: No rashes or lesions    LABS:                        10.8   8.71  )-----------( 387      ( 08 Mar 2021 06:56 )             33.8       CBC:  03-08 @ 06:56  WBC  8.71  HGB 10.8  HCT 33.8 Plate 387  MCV 92.6  03-07 @ 07:14  WBC  7.25  HGB 11.6  HCT 36.4 Plate 407  MCV 92.2  03-06 @ 07:37  WBC  8.39  HGB 11.4  HCT 34.6 Plate 425  MCV 90.3  03-05 @ 06:16  WBC  10.48  HGB 11.4  HCT 35.6 Plate 431  MCV 91.5  03-04 @ 14:36  WBC  16.52  HGB 12.1  HCT 36.9 Plate 504  MCV 90.2           08 Mar 2021 06:56    137    |  102    |  14     ----------------------------<  147    3.8     |  29     |  0.86   07 Mar 2021 07:14    137    |  102    |  10     ----------------------------<  128    3.7     |  28     |  0.82   06 Mar 2021 07:37    141    |  107    |  11     ----------------------------<  103    3.6     |  28     |  0.77   05 Mar 2021 06:16    139    |  105    |  13     ----------------------------<  76     3.6     |  28     |  0.73   04 Mar 2021 14:36    140    |  106    |  16     ----------------------------<  97     4.4     |  26     |  0.67     Ca    8.3        08 Mar 2021 06:56  Ca    8.4        07 Mar 2021 07:14  Ca    8.6        06 Mar 2021 07:37  Ca    8.3        05 Mar 2021 06:16  Ca    8.8        04 Mar 2021 14:36  Phos  3.7       08 Mar 2021 06:56  Phos  3.6       07 Mar 2021 07:14  Phos  3.7       06 Mar 2021 07:37  Mg     1.8       08 Mar 2021 06:56  Mg     1.8       07 Mar 2021 07:14  Mg     1.6       06 Mar 2021 07:37    TPro  7.2    /  Alb  2.9    /  TBili  0.4    /  DBili  x      /  AST  36     /  ALT  44     /  AlkPhos  138    07 Mar 2021 07:14  TPro  7.3    /  Alb  2.9    /  TBili  0.5    /  DBili  x      /  AST  37     /  ALT  44     /  AlkPhos  145    06 Mar 2021 07:37  TPro  8.1    /  Alb  3.2    /  TBili  0.4    /  DBili  x      /  AST  23     /  ALT  29     /  AlkPhos  139    04 Mar 2021 14:36            RADIOLOGY & ADDITIONAL STUDIES:  < from: CT Abdomen and Pelvis w/ IV Cont (03.04.21 @ 18:13) >    EXAM:  CT ABDOMEN AND PELVIS IC                            PROCEDURE DATE:  03/04/2021          INTERPRETATION:  CLINICAL INFORMATION: Vomiting diarrhea    COMPARISON: 10/10/2020.    CONTRAST/COMPLICATIONS:  IV Contrast: Omnipaque 350 90 cc administered / .  Oral Contrast: NONE  Complications: None reported at time of study completion    PROCEDURE:  CT of the Abdomen and Pelvis was performed.  Sagittal and coronal reformats were performed.    FINDINGS:  LOWER CHEST: Within normal limits.    LIVER: Within normal limits.  BILE DUCTS: Normal caliber.  GALLBLADDER: Within normal limits.  SPLEEN: Within normal limits.  PANCREAS: Within normal limits.  ADRENALS: Within normal limits.  KIDNEYS/URETERS: Within normal limits.    BLADDER: Within normal limits.  REPRODUCTIVE ORGANS: IUD in situ    BOWEL: Scattered prominent fluid-filled loops of small and large bowel suggesting diarrheal state likely a nonspecific mild enterocolitis. Appendix normal  PERITONEUM: No ascites.  VESSELS: Nonaneurysmal.  RETROPERITONEUM/LYMPH NODES: No lymphadenopathy.  ABDOMINAL WALL: Within normal limits.  BONES: No acute or aggressive pathology.    IMPRESSION:  Diarrheal state. Likely mild nonspecific enterocolitis. No obstructive pathology              JORGE MUELLER MD; Attending Radiologist  This document has been electronically signed. Mar  4 2021  6:25PM    < end of copied text >   HPI:  50 year old female PMH of DM, morbid obesity, HTN, IBS, PSH of cholecystectomy hx presenting to ED due to abdominal pain, severe, nausea/vomiting and diarrhea since yesterday. Noted profuse vomiting and diarrhea which has been very active - to the point of using adult diapers at home. No fevers or abdominal pain.     In past admissions she responds well to IVF and IV ABX for few days.  (04 Mar 2021 18:55)  ---------- As Above -------------  The patient presents with a few days of N/V, abdominal pain and diarrhea. She has been having multiple episodes of the above since last year. The patient was to have an EGD  / colonoscopy but was postponed because of Coved. It is non bloody. The patient states that she has been having diarrhea constantly over the past year. A trial off Metformin did not help. A lactose free diet nor Cholestyramine did not help. The patient's daughter has a history of celiac disease. She is on a lactose free diet. No recent travel or antibiotics  See labs / CT scan      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12)    History of cholecystectomy        MEDICATIONS  (STANDING):  ciprofloxacin   IVPB 400 milliGRAM(s) IV Intermittent every 12 hours  enalapril 10 milliGRAM(s) Oral daily  enoxaparin Injectable 40 milliGRAM(s) SubCutaneous daily  furosemide    Tablet 40 milliGRAM(s) Oral daily  insulin glargine Injectable (LANTUS) 30 Unit(s) SubCutaneous at bedtime  insulin lispro Injectable (ADMELOG) 10 Unit(s) SubCutaneous three times a day before meals  metroNIDAZOLE  IVPB      metroNIDAZOLE  IVPB 500 milliGRAM(s) IV Intermittent every 8 hours  pantoprazole  Injectable 40 milliGRAM(s) IV Push daily  pregabalin 150 milliGRAM(s) Oral two times a day    MEDICATIONS  (PRN):  aluminum hydroxide/magnesium hydroxide/simethicone Suspension 30 milliLiter(s) Oral every 6 hours PRN Dyspepsia  cyclobenzaprine 10 milliGRAM(s) Oral two times a day PRN Muscle Spasm  loperamide 2 milliGRAM(s) Oral four times a day PRN loose BM  morphine  - Injectable 4 milliGRAM(s) IV Push every 4 hours PRN Severe Pain (7 - 10)  morphine  - Injectable 2 milliGRAM(s) IV Push every 6 hours PRN Moderate Pain (4 - 6)  ondansetron Injectable 4 milliGRAM(s) IV Push every 6 hours PRN Nausea and/or Vomiting  oxycodone    5 mG/acetaminophen 325 mG 2 Tablet(s) Oral every 4 hours PRN Mild Pain (1 - 3)  simethicone 80 milliGRAM(s) Chew three times a day PRN Gas      Allergies    amitriptyline (Other)    Intolerances        FAMILY HISTORY:  No pertinent family history in first degree relatives    No pertinent family history in first degree relatives        REVIEW OF SYSTEMS:    CONSTITUTIONAL: No fever, weight loss,   EYES: No eye pain, visual disturbances, or discharge  ENMT:  No difficulty hearing, tinnitus, vertigo; No sinus or throat pain  NECK: No pain or stiffness  BREASTS: No pain, masses, or nipple discharge  RESPIRATORY: No cough, wheezing, chills or hemoptysis; No shortness of breath  CARDIOVASCULAR: No chest pain, palpitations, dizziness, or leg swelling  GASTROINTESTINAL:  see above  GENITOURINARY: No dysuria, frequency, hematuria, or incontinence  NEUROLOGICAL: No headaches, memory loss, loss of strength, numbness, or tremors  SKIN: No itching, burning, rashes, or lesions   LYMPH NODES: No enlarged glands  ENDOCRINE: No heat or cold intolerance; No hair loss  MUSCULOSKELETAL: No joint pain or swelling; No muscle, back, or extremity pain  PSYCHIATRIC: No depression, anxiety, mood swings, or difficulty sleeping  HEME/LYMPH: No easy bruising, or bleeding gums  ALLERGY AND IMMUNOLOGIC: No hives or eczema          SOCIAL HISTORY:    FAMILY HISTORY:  No pertinent family history in first degree relatives    No pertinent family history in first degree relatives        Vital Signs Last 24 Hrs  T(C): 36.5 (08 Mar 2021 17:29), Max: 36.8 (08 Mar 2021 13:49)  T(F): 97.7 (08 Mar 2021 17:29), Max: 98.3 (08 Mar 2021 13:49)  HR: 67 (08 Mar 2021 17:29) (65 - 97)  BP: 119/75 (08 Mar 2021 17:29) (110/65 - 137/68)  BP(mean): --  RR: 18 (08 Mar 2021 17:29) (18 - 19)  SpO2: 97% (08 Mar 2021 17:29) (95% - 97%)    PHYSICAL EXAM:    GENERAL: NAD, well-groomed, well-developed  HEAD:  Atraumatic, Normocephalic  EYES: EOMI, PERRLA, conjunctiva and sclera clear  NECK: Supple, No JVD, Normal thyroid  NERVOUS SYSTEM:  Alert & Oriented X3, Good concentration; Motor Strength 5/5 B/L upper and lower extremities;   CHEST/LUNG: Clear to percussion bilaterally; No rales, rhonchi, wheezing, or rubs  HEART: Regular rate and rhythm; No murmurs, rubs, or gallops  ABDOMEN: Soft, Nontender, Nondistended; Bowel sounds present  EXTREMITIES:  2+ Peripheral Pulses, No clubbing, cyanosis, or edema  LYMPH: No lymphadenopathy noted   RECTAL: Deferred   SKIN: No rashes or lesions    LABS:                        10.8   8.71  )-----------( 387      ( 08 Mar 2021 06:56 )             33.8       CBC:  03-08 @ 06:56  WBC  8.71  HGB 10.8  HCT 33.8 Plate 387  MCV 92.6  03-07 @ 07:14  WBC  7.25  HGB 11.6  HCT 36.4 Plate 407  MCV 92.2  03-06 @ 07:37  WBC  8.39  HGB 11.4  HCT 34.6 Plate 425  MCV 90.3  03-05 @ 06:16  WBC  10.48  HGB 11.4  HCT 35.6 Plate 431  MCV 91.5  03-04 @ 14:36  WBC  16.52  HGB 12.1  HCT 36.9 Plate 504  MCV 90.2           08 Mar 2021 06:56    137    |  102    |  14     ----------------------------<  147    3.8     |  29     |  0.86   07 Mar 2021 07:14    137    |  102    |  10     ----------------------------<  128    3.7     |  28     |  0.82   06 Mar 2021 07:37    141    |  107    |  11     ----------------------------<  103    3.6     |  28     |  0.77   05 Mar 2021 06:16    139    |  105    |  13     ----------------------------<  76     3.6     |  28     |  0.73   04 Mar 2021 14:36    140    |  106    |  16     ----------------------------<  97     4.4     |  26     |  0.67     Ca    8.3        08 Mar 2021 06:56  Ca    8.4        07 Mar 2021 07:14  Ca    8.6        06 Mar 2021 07:37  Ca    8.3        05 Mar 2021 06:16  Ca    8.8        04 Mar 2021 14:36  Phos  3.7       08 Mar 2021 06:56  Phos  3.6       07 Mar 2021 07:14  Phos  3.7       06 Mar 2021 07:37  Mg     1.8       08 Mar 2021 06:56  Mg     1.8       07 Mar 2021 07:14  Mg     1.6       06 Mar 2021 07:37    TPro  7.2    /  Alb  2.9    /  TBili  0.4    /  DBili  x      /  AST  36     /  ALT  44     /  AlkPhos  138    07 Mar 2021 07:14  TPro  7.3    /  Alb  2.9    /  TBili  0.5    /  DBili  x      /  AST  37     /  ALT  44     /  AlkPhos  145    06 Mar 2021 07:37  TPro  8.1    /  Alb  3.2    /  TBili  0.4    /  DBili  x      /  AST  23     /  ALT  29     /  AlkPhos  139    04 Mar 2021 14:36            RADIOLOGY & ADDITIONAL STUDIES:  < from: CT Abdomen and Pelvis w/ IV Cont (03.04.21 @ 18:13) >    EXAM:  CT ABDOMEN AND PELVIS IC                            PROCEDURE DATE:  03/04/2021          INTERPRETATION:  CLINICAL INFORMATION: Vomiting diarrhea    COMPARISON: 10/10/2020.    CONTRAST/COMPLICATIONS:  IV Contrast: Omnipaque 350 90 cc administered / .  Oral Contrast: NONE  Complications: None reported at time of study completion    PROCEDURE:  CT of the Abdomen and Pelvis was performed.  Sagittal and coronal reformats were performed.    FINDINGS:  LOWER CHEST: Within normal limits.    LIVER: Within normal limits.  BILE DUCTS: Normal caliber.  GALLBLADDER: Within normal limits.  SPLEEN: Within normal limits.  PANCREAS: Within normal limits.  ADRENALS: Within normal limits.  KIDNEYS/URETERS: Within normal limits.    BLADDER: Within normal limits.  REPRODUCTIVE ORGANS: IUD in situ    BOWEL: Scattered prominent fluid-filled loops of small and large bowel suggesting diarrheal state likely a nonspecific mild enterocolitis. Appendix normal  PERITONEUM: No ascites.  VESSELS: Nonaneurysmal.  RETROPERITONEUM/LYMPH NODES: No lymphadenopathy.  ABDOMINAL WALL: Within normal limits.  BONES: No acute or aggressive pathology.    IMPRESSION:  Diarrheal state. Likely mild nonspecific enterocolitis. No obstructive pathology              JORGE MUELLER MD; Attending Radiologist  This document has been electronically signed. Mar  4 2021  6:25PM    < end of copied text >

## 2021-03-08 NOTE — DIETITIAN INITIAL EVALUATION ADULT. - NS FNS REASON FOR WEIGHT CHANG
intentionally cutting down portion sizes of meals, and unintentionally due to ongoing GI problems./decreased po intake

## 2021-03-08 NOTE — PROGRESS NOTE ADULT - ASSESSMENT
50 year old female PMH of DM, HTN, IBS, PSH of cholecystectomy hx presenting to ED due to abdominal pain, severe, nausea/vomiting and diarrhea since yesterday. Noted profuse vomiting and diarrhea which has been very active - to the point of using adult diapers at home. No fevers or abdominal pain.     GI: Likely exacerbation of Irritable bowl syndrome. Responds  well to IVF, IV ABX for brief course. CT shows no SBO, mild colitis.   appreciate GI n AM. regular diet .  IV PPI and Simethicone tid. Add Immodium PRN.      DM:   insulin coverage at home to Lantus 30, Ademolog 10 units tid. A1C in AM. Continue Lyrica for LE neuropathy at 150 mg bid.     CV: Continue Vasotec 10 mg bid at home do    -Foot pain will check MRI will contact podiatry DVT study negative

## 2021-03-08 NOTE — CONSULT NOTE ADULT - ASSESSMENT
HPI:  50 year old female PMH of DM, morbid obesity, HTN, IBS, PSH of cholecystectomy hx presenting to ED due to abdominal pain, severe, nausea/vomiting and diarrhea since yesterday. Noted profuse vomiting and diarrhea which has been very active - to the point of using adult diapers at home. No fevers or abdominal pain.     In past admissions she responds well to IVF and IV ABX for few days.  (04 Mar 2021 18:55)  ---------- As Above -------------  The patient presents with a few days of N/V, abdominal pain and diarrhea. She has been having multiple episodes of the above since last year. The patient was to have an EGD  / colonoscopy but was postponed because of Coved. It is non bloody. The patient states that she has been having diarrhea constantly over the past year. A trial off Metformin did not help. A lactose free diet nor Cholestyramine did not help. The patient's daughter has a history of celiac disease. She is on a lactose free diet.  See labs / CT scan      Chronic diarrhea with N/V - No improvement on clear liquid diet ( lactose free ) and off metformin - R/O Celiac , microscopic/ collagenous colitis , gastrinoma, etc. -  HPI:  50 year old female PMH of DM, morbid obesity, HTN, IBS, PSH of cholecystectomy hx presenting to ED due to abdominal pain, severe, nausea/vomiting and diarrhea since yesterday. Noted profuse vomiting and diarrhea which has been very active - to the point of using adult diapers at home. No fevers or abdominal pain.     In past admissions she responds well to IVF and IV ABX for few days.  (04 Mar 2021 18:55)  ---------- As Above -------------  The patient presents with a few days of N/V, abdominal pain and diarrhea. She has been having multiple episodes of the above since last year. The patient was to have an EGD  / colonoscopy but was postponed because of Coved. It is non bloody. The patient states that she has been having diarrhea constantly over the past year. A trial off Metformin did not help. A lactose free diet nor Cholestyramine did not help. The patient's daughter has a history of celiac disease. She is on a lactose free diet.  See labs / CT scan      Chronic diarrhea with N/V - No improvement on clear liquid diet ( lactose free ) and off metformin - R/O Celiac , microscopic/ collagenous colitis , gastrinoma, etc. - 1) Celiac panel 2) Gluten free diet 3) Stop Pregabalin ( 1 % chance to cause diarrhea ) 4) 24 hour HIAA 5) Gastrin level 5) treat for bacterial over growth ?  6) Cholestyramine

## 2021-03-08 NOTE — DIETITIAN INITIAL EVALUATION ADULT. - OTHER INFO
Pt adm w/abdominal pain, nausea/vomiting/diarrhea since PTA. Per chart pt w/gastroenteritis, likely exacerbation of IBS. Met w/pt at bedside, pt appeared lethargic and sleepy, (came back from MRI shortly before), able to answer questions. Pt repots gradually improving appetite & PO intake. Pt able to consume ~100% lunch meal this afternoon. Pt denies N/V, reports improving diarrhea. PTA pt reports depressed appetite x couple days due to GI symptoms. Pt does not follow any specific diets, however, states has been trying to cut portion sizes of meals. Pt reports UBW of ~360# x 1 year ago. Wt change as noted below. Pt reports checking finger sticks occasionally stating her finger tips hurt a lot due to neuropathy. Pt takes Pregabalin, Tresiba, Insulin (Aspart & Novolog), Victoza and Metformin for DM control. Noted HbA1c of 7.1%. Pt denies difficulty chewing but states she does "choke" on her foods at times. Pt denies being evaluation by SLP. Diet education on Diabetes & Nutrition, Meal Planning w/plate method, Fiber-restricted diet provided and compliance encouraged. Pt made aware RD remains available.

## 2021-03-08 NOTE — DIETITIAN INITIAL EVALUATION ADULT. - PERTINENT LABORATORY DATA
03-08 Na137 mmol/L Glu 147 mg/dL<H> K+ 3.8 mmol/L Cr  0.86 mg/dL BUN 14 mg/dL 03-08 Phos 3.7 mg/dL 03-07 Alb 2.9 g/dL<L> 03-05 Chol 167 mg/dL LDL --    HDL 61 mg/dL Trig 73 mg/dL    03-05-21 @ 08:56A1C 7.1  POCT Glucose: (3/8) 135, (3/7) 161, 109, 125, 139.

## 2021-03-08 NOTE — DIETITIAN NUTRITION RISK NOTIFICATION - TREATMENT: THE FOLLOWING DIET HAS BEEN RECOMMENDED
Diet, Consistent Carbohydrate w/Evening Snack:   Soft  Fiber/Residue Restricted  Low Sodium (03-08-21 @ 15:35) [Pending Verification By Attending]  Diet, Consistent Carbohydrate w/Evening Snack (03-06-21 @ 14:12) [Active]
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
## 2021-03-08 NOTE — DIETITIAN INITIAL EVALUATION ADULT. - PERTINENT MEDS FT
MEDICATIONS  (STANDING):  ciprofloxacin   IVPB 400 milliGRAM(s) IV Intermittent every 12 hours  enalapril 10 milliGRAM(s) Oral daily  enoxaparin Injectable 40 milliGRAM(s) SubCutaneous daily  furosemide    Tablet 40 milliGRAM(s) Oral daily  insulin glargine Injectable (LANTUS) 30 Unit(s) SubCutaneous at bedtime  insulin lispro Injectable (ADMELOG) 10 Unit(s) SubCutaneous three times a day before meals  metroNIDAZOLE  IVPB      metroNIDAZOLE  IVPB 500 milliGRAM(s) IV Intermittent every 8 hours  pantoprazole  Injectable 40 milliGRAM(s) IV Push daily  pregabalin 150 milliGRAM(s) Oral two times a day    MEDICATIONS  (PRN):  aluminum hydroxide/magnesium hydroxide/simethicone Suspension 30 milliLiter(s) Oral every 6 hours PRN Dyspepsia  cyclobenzaprine 10 milliGRAM(s) Oral two times a day PRN Muscle Spasm  loperamide 2 milliGRAM(s) Oral four times a day PRN loose BM  morphine  - Injectable 4 milliGRAM(s) IV Push every 4 hours PRN Severe Pain (7 - 10)  morphine  - Injectable 2 milliGRAM(s) IV Push every 6 hours PRN Moderate Pain (4 - 6)  ondansetron Injectable 4 milliGRAM(s) IV Push every 6 hours PRN Nausea and/or Vomiting  oxycodone    5 mG/acetaminophen 325 mG 2 Tablet(s) Oral every 4 hours PRN Mild Pain (1 - 3)  simethicone 80 milliGRAM(s) Chew three times a day PRN Gas

## 2021-03-09 ENCOUNTER — TRANSCRIPTION ENCOUNTER (OUTPATIENT)
Age: 51
End: 2021-03-09

## 2021-03-09 DIAGNOSIS — Z71.89 OTHER SPECIFIED COUNSELING: ICD-10-CM

## 2021-03-09 LAB
ALBUMIN SERPL ELPH-MCNC: 3 G/DL — LOW (ref 3.3–5)
ALP SERPL-CCNC: 146 U/L — HIGH (ref 40–120)
ALT FLD-CCNC: 76 U/L — SIGNIFICANT CHANGE UP (ref 12–78)
ANION GAP SERPL CALC-SCNC: 6 MMOL/L — SIGNIFICANT CHANGE UP (ref 5–17)
AST SERPL-CCNC: 78 U/L — HIGH (ref 15–37)
BILIRUB SERPL-MCNC: 0.2 MG/DL — SIGNIFICANT CHANGE UP (ref 0.2–1.2)
BUN SERPL-MCNC: 15 MG/DL — SIGNIFICANT CHANGE UP (ref 7–23)
CALCIUM SERPL-MCNC: 8.6 MG/DL — SIGNIFICANT CHANGE UP (ref 8.5–10.1)
CHLORIDE SERPL-SCNC: 103 MMOL/L — SIGNIFICANT CHANGE UP (ref 96–108)
CO2 SERPL-SCNC: 29 MMOL/L — SIGNIFICANT CHANGE UP (ref 22–31)
CREAT SERPL-MCNC: 0.89 MG/DL — SIGNIFICANT CHANGE UP (ref 0.5–1.3)
GLUCOSE BLDC GLUCOMTR-MCNC: 132 MG/DL — HIGH (ref 70–99)
GLUCOSE BLDC GLUCOMTR-MCNC: 139 MG/DL — HIGH (ref 70–99)
GLUCOSE BLDC GLUCOMTR-MCNC: 153 MG/DL — HIGH (ref 70–99)
GLUCOSE BLDC GLUCOMTR-MCNC: 187 MG/DL — HIGH (ref 70–99)
GLUCOSE SERPL-MCNC: 157 MG/DL — HIGH (ref 70–99)
HCT VFR BLD CALC: 36.6 % — SIGNIFICANT CHANGE UP (ref 34.5–45)
HGB BLD-MCNC: 12 G/DL — SIGNIFICANT CHANGE UP (ref 11.5–15.5)
MAGNESIUM SERPL-MCNC: 1.6 MG/DL — SIGNIFICANT CHANGE UP (ref 1.6–2.6)
MCHC RBC-ENTMCNC: 30.2 PG — SIGNIFICANT CHANGE UP (ref 27–34)
MCHC RBC-ENTMCNC: 32.8 GM/DL — SIGNIFICANT CHANGE UP (ref 32–36)
MCV RBC AUTO: 92.2 FL — SIGNIFICANT CHANGE UP (ref 80–100)
NRBC # BLD: 0 /100 WBCS — SIGNIFICANT CHANGE UP (ref 0–0)
PHOSPHATE SERPL-MCNC: 3.9 MG/DL — SIGNIFICANT CHANGE UP (ref 2.5–4.5)
PLATELET # BLD AUTO: 415 K/UL — HIGH (ref 150–400)
POTASSIUM SERPL-MCNC: 3.8 MMOL/L — SIGNIFICANT CHANGE UP (ref 3.5–5.3)
POTASSIUM SERPL-SCNC: 3.8 MMOL/L — SIGNIFICANT CHANGE UP (ref 3.5–5.3)
PROT SERPL-MCNC: 7.5 GM/DL — SIGNIFICANT CHANGE UP (ref 6–8.3)
RBC # BLD: 3.97 M/UL — SIGNIFICANT CHANGE UP (ref 3.8–5.2)
RBC # FLD: 13.2 % — SIGNIFICANT CHANGE UP (ref 10.3–14.5)
SODIUM SERPL-SCNC: 138 MMOL/L — SIGNIFICANT CHANGE UP (ref 135–145)
WBC # BLD: 9.33 K/UL — SIGNIFICANT CHANGE UP (ref 3.8–10.5)
WBC # FLD AUTO: 9.33 K/UL — SIGNIFICANT CHANGE UP (ref 3.8–10.5)

## 2021-03-09 PROCEDURE — 99233 SBSQ HOSP IP/OBS HIGH 50: CPT

## 2021-03-09 PROCEDURE — 99231 SBSQ HOSP IP/OBS SF/LOW 25: CPT

## 2021-03-09 PROCEDURE — 99223 1ST HOSP IP/OBS HIGH 75: CPT

## 2021-03-09 RX ORDER — SERTRALINE 25 MG/1
50 TABLET, FILM COATED ORAL ONCE
Refills: 0 | Status: COMPLETED | OUTPATIENT
Start: 2021-03-09 | End: 2021-03-09

## 2021-03-09 RX ORDER — SERTRALINE 25 MG/1
50 TABLET, FILM COATED ORAL
Refills: 0 | Status: DISCONTINUED | OUTPATIENT
Start: 2021-03-10 | End: 2021-03-12

## 2021-03-09 RX ADMIN — MORPHINE SULFATE 2 MILLIGRAM(S): 50 CAPSULE, EXTENDED RELEASE ORAL at 11:45

## 2021-03-09 RX ADMIN — Medication 100 MILLIGRAM(S): at 15:01

## 2021-03-09 RX ADMIN — SIMETHICONE 80 MILLIGRAM(S): 80 TABLET, CHEWABLE ORAL at 17:50

## 2021-03-09 RX ADMIN — MORPHINE SULFATE 4 MILLIGRAM(S): 50 CAPSULE, EXTENDED RELEASE ORAL at 02:35

## 2021-03-09 RX ADMIN — Medication 10 MILLIGRAM(S): at 05:15

## 2021-03-09 RX ADMIN — Medication 40 MILLIGRAM(S): at 05:15

## 2021-03-09 RX ADMIN — Medication 200 MILLIGRAM(S): at 17:44

## 2021-03-09 RX ADMIN — MORPHINE SULFATE 4 MILLIGRAM(S): 50 CAPSULE, EXTENDED RELEASE ORAL at 22:03

## 2021-03-09 RX ADMIN — Medication 10 UNIT(S): at 08:21

## 2021-03-09 RX ADMIN — Medication 150 MILLIGRAM(S): at 05:17

## 2021-03-09 RX ADMIN — Medication 10 UNIT(S): at 11:45

## 2021-03-09 RX ADMIN — MORPHINE SULFATE 4 MILLIGRAM(S): 50 CAPSULE, EXTENDED RELEASE ORAL at 22:33

## 2021-03-09 RX ADMIN — ONDANSETRON 4 MILLIGRAM(S): 8 TABLET, FILM COATED ORAL at 22:02

## 2021-03-09 RX ADMIN — MORPHINE SULFATE 2 MILLIGRAM(S): 50 CAPSULE, EXTENDED RELEASE ORAL at 11:30

## 2021-03-09 RX ADMIN — Medication 150 MILLIGRAM(S): at 17:44

## 2021-03-09 RX ADMIN — ENOXAPARIN SODIUM 40 MILLIGRAM(S): 100 INJECTION SUBCUTANEOUS at 11:45

## 2021-03-09 RX ADMIN — Medication 100 MILLIGRAM(S): at 05:17

## 2021-03-09 RX ADMIN — SERTRALINE 50 MILLIGRAM(S): 25 TABLET, FILM COATED ORAL at 16:26

## 2021-03-09 RX ADMIN — Medication 200 MILLIGRAM(S): at 05:14

## 2021-03-09 RX ADMIN — Medication 10 UNIT(S): at 16:33

## 2021-03-09 RX ADMIN — MORPHINE SULFATE 4 MILLIGRAM(S): 50 CAPSULE, EXTENDED RELEASE ORAL at 02:50

## 2021-03-09 RX ADMIN — PANTOPRAZOLE SODIUM 40 MILLIGRAM(S): 20 TABLET, DELAYED RELEASE ORAL at 11:45

## 2021-03-09 RX ADMIN — Medication 100 MILLIGRAM(S): at 22:02

## 2021-03-09 RX ADMIN — INSULIN GLARGINE 30 UNIT(S): 100 INJECTION, SOLUTION SUBCUTANEOUS at 22:02

## 2021-03-09 NOTE — PROGRESS NOTE BEHAVIORAL HEALTH - NSBHCHARTREVIEWLAB_PSY_A_CORE FT
03-09    138  |  103  |  15  ----------------------------<  157<H>  3.8   |  29  |  0.89    Ca    8.6      09 Mar 2021 10:34  Phos  3.9     03-09  Mg     1.6     03-09    TPro  7.5  /  Alb  3.0<L>  /  TBili  0.2  /  DBili  x   /  AST  78<H>  /  ALT  76  /  AlkPhos  146<H>  03-09
03-08    137  |  102  |  14  ----------------------------<  147<H>  3.8   |  29  |  0.86    Ca    8.3<L>      08 Mar 2021 06:56  Phos  3.7     03-08  Mg     1.8     03-08    TPro  7.2  /  Alb  2.9<L>  /  TBili  0.4  /  DBili  x   /  AST  36  /  ALT  44  /  AlkPhos  138<H>  03-07

## 2021-03-09 NOTE — CONSULT NOTE ADULT - ASSESSMENT
50 year old female admitted for abdominal pain, N/V, diarrhea c/o severe diffuse joint pain/swelling for the past year of unclear etiology.  Will r/o RA vs other inflammatory arthritis.  Given GI symptoms, the DDx also includes joint pains secondary to celiac or colitis.    - Will check labs and x-rays.    - f/u GI w/u.    - If OK, would start an NSAID (e.g naproxen 500mg BID) for now.

## 2021-03-09 NOTE — PROGRESS NOTE BEHAVIORAL HEALTH - NSBHCONSULTFOLLOWAFTERCARE_PSY_A_CORE FT
psych cleared for discharge; has outpatient psych resources so does not need new referrals
psych cleared for discharge; has outpatient psych resources so does not need new referrals

## 2021-03-09 NOTE — CONSULT NOTE ADULT - SUBJECTIVE AND OBJECTIVE BOX
HISTORY OF PRESENT ILLNESS:    Patient is a 50y Female    HPI:  50 year old female PMH of DM, morbid obesity, HTN, IBS, PSH of cholecystectomy hx presenting to ED due to abdominal pain, severe, nausea/vomiting and diarrhea since yesterday. Noted profuse vomiting and diarrhea which has been very active - to the point of using adult diapers at home. No fevers or abdominal pain.     In past admissions she responds well to IVF and IV ABX for few days.  (04 Mar 2021 18:55)  Of note, pt reports that she has been experiencing severe diffuse joint pains, worst in her hands, shoulders, and knees, for the past year.  The pain is constant, though worse with activity.  She describes the pain as burning, 10 out of 10.  (+)associated joint swelling, janes in her hands and knees.  (+)AM stiffness lasting 10-15 minutes.  She tried taking meloxicam but it did not help.  She get some relief from heating pads.  No other known alleviating factors.    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  No fever/chills, wt loss, night sweats, chest pain/dyspnea/cough, oral ulcers, rashes, photosensitivity, Raynaud's, dysphagia, focal weakness, or eye symptoms.    MEDICATIONS  (STANDING):  ciprofloxacin   IVPB 400 milliGRAM(s) IV Intermittent every 12 hours  enalapril 10 milliGRAM(s) Oral daily  enoxaparin Injectable 40 milliGRAM(s) SubCutaneous daily  furosemide    Tablet 40 milliGRAM(s) Oral daily  insulin glargine Injectable (LANTUS) 30 Unit(s) SubCutaneous at bedtime  insulin lispro Injectable (ADMELOG) 10 Unit(s) SubCutaneous three times a day before meals  metroNIDAZOLE  IVPB      metroNIDAZOLE  IVPB 500 milliGRAM(s) IV Intermittent every 8 hours  pantoprazole  Injectable 40 milliGRAM(s) IV Push daily  pregabalin 150 milliGRAM(s) Oral two times a day    MEDICATIONS  (PRN):  aluminum hydroxide/magnesium hydroxide/simethicone Suspension 30 milliLiter(s) Oral every 6 hours PRN Dyspepsia  cyclobenzaprine 10 milliGRAM(s) Oral two times a day PRN Muscle Spasm  loperamide 2 milliGRAM(s) Oral four times a day PRN loose BM  morphine  - Injectable 4 milliGRAM(s) IV Push every 4 hours PRN Severe Pain (7 - 10)  morphine  - Injectable 2 milliGRAM(s) IV Push every 6 hours PRN Moderate Pain (4 - 6)  ondansetron Injectable 4 milliGRAM(s) IV Push every 6 hours PRN Nausea and/or Vomiting  oxycodone    5 mG/acetaminophen 325 mG 2 Tablet(s) Oral every 4 hours PRN Mild Pain (1 - 3)  simethicone 80 milliGRAM(s) Chew three times a day PRN Gas      Allergies    amitriptyline (Other)    Intolerances        FAMILY HISTORY:  No pertinent family history in first degree relatives    No pertinent family history in first degree relatives        SOCIAL HISTORY:  Tobacco--   none            Vital Signs Last 24 Hrs  T(C): 36.7 (09 Mar 2021 05:09), Max: 36.8 (08 Mar 2021 13:49)  T(F): 98.1 (09 Mar 2021 05:09), Max: 98.3 (08 Mar 2021 13:49)  HR: 71 (09 Mar 2021 05:09) (62 - 71)  BP: 127/69 (09 Mar 2021 05:09) (119/75 - 131/76)  BP(mean): --  RR: 18 (09 Mar 2021 05:09) (18 - 19)  SpO2: 96% (09 Mar 2021 05:09) (95% - 97%)    PHYSICAL EXAM:  General :  NAD  HEENT--  no oral ulcers  Nodes--   LAD  Lungs--  CTA B/L  Heart--  RRR, nlS1 &S2 normal;   Abdomen--  soft, NT, ND +BS  Skin:  no rashes  Musculoskeletal exam:  (+)diffuse swelling in B/L hands, (+)tenderness over all B/L PIP's and MCP's; pt w/ difficulty closing her fists;   B/L wrists w/ (+)tenderness, pain upon flexion/extension;  B/L shoulders w/ pain upon movement in all planes (R>L);  B/L knees w/ pain upon flexion/extension (R>L);  B/L ankles w/ tenderness;  (+)diffuse swelling in B/L feet    LABS:                        10.8   8.71  )-----------( 387      ( 08 Mar 2021 06:56 )             33.8     03-08    137  |  102  |  14  ----------------------------<  147<H>  3.8   |  29  |  0.86    Ca    8.3<L>      08 Mar 2021 06:56  Phos  3.7     03-08  Mg     1.8     03-08        RADIOLOGY & ADDITIONAL STUDIES:    < from: MR Foot w/wo IV Cont, Right (03.08.21 @ 13:19) >  EXAM:  MR FOOT WAW IC RT                            PROCEDURE DATE:  03/08/2021          INTERPRETATION:  RIGHT FOOT MRI    CLINICAL INFORMATION: Right foot pain. Cellulitis.  TECHNIQUE: Multiplanar, multisequence MRI was obtained of the right footwithout the administration of intravenous contrast. 8 cc of Gadavist were administered intravenously. 2 cc were discarded..    FINDINGS:    No marrow signal abnormality. No marrow enhancement or edema. Flexor and extensor tendons are intact. There isatrophy of the abductor digiti minimi. Remaining musculature is unremarkable. No fluid collection. Scarring is present in the sinus Tarsi. Degenerative spurring along the dorsal aspect of the talonavicular and navicular cuneiform articulations. Thereis diffuse mild subcutaneous edema.    IMPRESSION:  No osteomyelitis. No abscess.      < end of copied text >

## 2021-03-09 NOTE — DISCHARGE NOTE PROVIDER - NSDCMRMEDTOKEN_GEN_ALL_CORE_FT
Bentyl:   enalapril 10 mg oral tablet: 1 tab(s) orally once a day (in the morning)  Lasix 40 mg oral tablet: 1 tab(s) orally once a day (in the morning)  Lyrica 150 mg oral capsule: 1 cap(s) orally 2 times a day  meloxicam 15 mg oral tablet: 1 tab(s) orally once a day  metFORMIN 1000 mg oral tablet: 1 tab(s) orally 2 times a day  NovoLOG 100 units/mL subcutaneous solution: 10  subcutaneous  ondansetron 4 mg oral tablet, disintegratin tab(s) orally every 8 hours as needed for nausea/vomiting  pantoprazole 40 mg oral delayed release tablet: 1 tab(s) orally 2 times a day  pregabalin 150 mg oral capsule: 1 cap(s) orally 2 times a day MDD:2 caps  Protonix 40 mg oral delayed release tablet: 1 tab(s) orally once a day (in the morning)  simethicone 80 mg oral tablet, chewable: 1 tab(s) orally 3 times a day after meals and at bedtime as needed for Gas  traMADol 50 mg oral tablet: 1 tab(s) orally 2 times a day  Tresiba 100 units/mL subcutaneous solution: 80 unit(s) subcutaneous every 72 hours  Victoza 18 mg/3 mL subcutaneous solution: 1.8 milligrams daily (morning time)  Zoloft 50 mg oral tablet: 1 tab(s) orally once a day (in the morning)   enalapril 10 mg oral tablet: 1 tab(s) orally once a day (in the morning)  Lasix 40 mg oral tablet: 1 tab(s) orally once a day (in the morning)  meloxicam 15 mg oral tablet: 1 tab(s) orally once a day  NovoLOG 100 units/mL subcutaneous solution: 10  subcutaneous  ondansetron 4 mg oral tablet, disintegratin tab(s) orally every 8 hours as needed for nausea/vomiting  pantoprazole 40 mg oral delayed release tablet: 1 tab(s) orally 2 times a day  Protonix 40 mg oral delayed release tablet: 1 tab(s) orally once a day (in the morning)  simethicone 80 mg oral tablet, chewable: 1 tab(s) orally 3 times a day after meals and at bedtime as needed for Gas  traMADol 50 mg oral tablet: 1 tab(s) orally 2 times a day  Tresiba 100 units/mL subcutaneous solution: 80 unit(s) subcutaneous every 72 hours  Victoza 18 mg/3 mL subcutaneous solution: 1.8 milligrams daily (morning time)  Zoloft 50 mg oral tablet: 1 tab(s) orally once a day (in the morning)

## 2021-03-09 NOTE — PROGRESS NOTE ADULT - ASSESSMENT
HPI:  50 year old female PMH of DM, morbid obesity, HTN, IBS, PSH of cholecystectomy hx presenting to ED due to abdominal pain, severe, nausea/vomiting and diarrhea since yesterday. Noted profuse vomiting and diarrhea which has been very active - to the point of using adult diapers at home. No fevers or abdominal pain.     In past admissions she responds well to IVF and IV ABX for few days.  (04 Mar 2021 18:55)  ---------- As Above -------------  The patient presents with a few days of N/V, abdominal pain and diarrhea. She has been having multiple episodes of the above since last year. The patient was to have an EGD  / colonoscopy but was postponed because of Coved. It is non bloody. The patient states that she has been having diarrhea constantly over the past year. A trial off Metformin did not help. A lactose free diet nor Cholestyramine did not help. The patient's daughter has a history of celiac disease. She is on a lactose free diet.  See labs / CT scan      Chronic diarrhea with N/V - Patient states that she has improved on a gluten free, lactose free diet. Patient is off Metformin  - R/O Celiac , microscopic/ collagenous colitis , gastrinoma, etc. - 1) Check Celiac panel 2) Maintain Gluten free diet 3) Stop Pregabalin  (?) ( 1 % chance to cause diarrhea ) 4) Check  HIAA 5) Check Gastrin level 5) treat for bacterial over growth (?)  6) Cholestyramine 7) EGD with duodenal sweep biopsy

## 2021-03-09 NOTE — PROGRESS NOTE ADULT - PROBLEM SELECTOR PROBLEM 2
Bipolar disorder in full remission, most recent episode unspecified type

## 2021-03-09 NOTE — PROGRESS NOTE ADULT - PROBLEM SELECTOR PROBLEM 5
Irritable bowel syndrome with both constipation and diarrhea

## 2021-03-09 NOTE — DISCHARGE NOTE PROVIDER - DETAILS OF MALNUTRITION DIAGNOSIS/DIAGNOSES
This patient has been assessed with a concern for Malnutrition and was treated during this hospitalization for the following Nutrition diagnosis/diagnoses:     -  03/08/2021: Morbid obesity (BMI > 40)   This patient has been assessed with a concern for Malnutrition and was treated during this hospitalization for the following Nutrition diagnosis/diagnoses:     -  03/08/2021: Morbid obesity (BMI > 40)    This patient has been assessed with a concern for Malnutrition and was treated during this hospitalization for the following Nutrition diagnosis/diagnoses:     -  03/08/2021: Morbid obesity (BMI > 40)

## 2021-03-09 NOTE — DISCHARGE NOTE PROVIDER - HOSPITAL COURSE
50 year old female PMH of DM, HTN, IBS, PSH of cholecystectomy hx presenting to ED due to abdominal pain, severe, nausea/vomiting and diarrhea. Patient was given gluten free diet with improvement. GI was consulted and EGD was done that showed gastritis. PPI was recommended. symptoms improved.     Chronic diarrhea with N/V - improved with gluten free diet. gastritis on EGD. cont PPI. outpatient PCP follow up  is strongly recommended.      c/o severe diffuse joint pain/swelling for the past year of unclear etiology.  appreciated rheumatology consult recs. mild arthritic changes on X ray knees. cont home meds.     Hx of bipolar disorder. appreciated psychiatry consult recs. cont zoloft. pregabalin discontinued.     Morbid obesity. outpatient bariatric assessment.     DM type 2. controlled. cont home meds except metformin. recommended finger sticks at home and keep a log for PCP.     HTN. controlled. cont current meds. monitor.     Home with HHA.        Nutritional Assessment:  · Nutritional Assessment  This patient has been assessed with a concern for Malnutrition and has been determined to have a diagnosis/diagnoses of Morbid obesity (BMI > 40).    This patient is being managed with:   Diet Soft-  Consistent Carbohydrate {No Snacks}  Lactose Restricted (Milk Sugar Intoler.)  Gluten-Gliadin Restricted  Entered: Mar  8 2021  7:27PM      Seen and examined by me today. Vitals stable.   I have discussed all the inpatient radiographic findings with the patient and stressed that patient follows with the PCP for further outpatient care. I have printed inpatient lab results and report of imaging studies and given these to the patient/family to help with further outpatient care with PCP.   All questions welcomed and answered appropriately. Patient verbalized understanding of post discharge physician's follows up and discharge instructions.   DC time spent by me excluding billable procedures 38 mins

## 2021-03-09 NOTE — DISCHARGE NOTE PROVIDER - NSDCFUADDINST_GEN_ALL_CORE_FT
Podiatry Discharge Instructions:  Follow up: Please follow up with Dr. Telles within 1 week of discharge from the hospital, please call [phone] for appointment and discuss that you recently were seen in the hospital.    Weight bearing: Please weight bear as tolerated  Podiatry Discharge Instructions:  Follow up: Please follow up with Dr. Telles within 1 week of discharge from the hospital, please call [phone] for appointment and discuss that you recently were seen in the hospital.    Weight bearing: Please weight bear as tolerated     1) It is important to see your primary physician as well as other necessary consultants within the next week to perform a comprehensive medical review.  Call their offices for an appointment as soon as you leave the hospital.  If you do not have a primary physician or unable to reach your PCP, contact the [facility] Physician Referral Service at [phone].  Your medical issues appear to be stable at this time, but if your symptoms recur or worsen, contact your physicians and/or return to the hospital if necessary.  If you encounter any issues or questions with your medication, call your physicians before stopping the medication.

## 2021-03-09 NOTE — CONSULT NOTE ADULT - SUBJECTIVE AND OBJECTIVE BOX
Podiatry pager #: [phone]    Patient is a 50y old  Female who presents with a chief complaint of abdominal pain (08 Mar 2021 19:20)      HPI:  50 year old female PMH of DM, morbid obesity, HTN, IBS, PSH of cholecystectomy hx presenting to ED due to abdominal pain, severe, nausea/vomiting and diarrhea since yesterday. Noted profuse vomiting and diarrhea which has been very active - to the point of using adult diapers at home. No fevers or abdominal pain.     In past admissions she responds well to IVF and IV ABX for few days.  (04 Mar 2021 18:55)      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12)    History of cholecystectomy        MEDICATIONS  (STANDING):  ciprofloxacin   IVPB 400 milliGRAM(s) IV Intermittent every 12 hours  enalapril 10 milliGRAM(s) Oral daily  enoxaparin Injectable 40 milliGRAM(s) SubCutaneous daily  furosemide    Tablet 40 milliGRAM(s) Oral daily  insulin glargine Injectable (LANTUS) 30 Unit(s) SubCutaneous at bedtime  insulin lispro Injectable (ADMELOG) 10 Unit(s) SubCutaneous three times a day before meals  metroNIDAZOLE  IVPB      metroNIDAZOLE  IVPB 500 milliGRAM(s) IV Intermittent every 8 hours  pantoprazole  Injectable 40 milliGRAM(s) IV Push daily  pregabalin 150 milliGRAM(s) Oral two times a day    MEDICATIONS  (PRN):  aluminum hydroxide/magnesium hydroxide/simethicone Suspension 30 milliLiter(s) Oral every 6 hours PRN Dyspepsia  cyclobenzaprine 10 milliGRAM(s) Oral two times a day PRN Muscle Spasm  loperamide 2 milliGRAM(s) Oral four times a day PRN loose BM  morphine  - Injectable 4 milliGRAM(s) IV Push every 4 hours PRN Severe Pain (7 - 10)  morphine  - Injectable 2 milliGRAM(s) IV Push every 6 hours PRN Moderate Pain (4 - 6)  ondansetron Injectable 4 milliGRAM(s) IV Push every 6 hours PRN Nausea and/or Vomiting  oxycodone    5 mG/acetaminophen 325 mG 2 Tablet(s) Oral every 4 hours PRN Mild Pain (1 - 3)  simethicone 80 milliGRAM(s) Chew three times a day PRN Gas      Allergies    amitriptyline (Other)    Intolerances        VITALS:    Vital Signs Last 24 Hrs  T(C): 36.9 (09 Mar 2021 11:09), Max: 36.9 (09 Mar 2021 11:09)  T(F): 98.5 (09 Mar 2021 11:09), Max: 98.5 (09 Mar 2021 11:09)  HR: 72 (09 Mar 2021 11:09) (62 - 72)  BP: 108/75 (09 Mar 2021 11:09) (108/75 - 131/76)  BP(mean): --  RR: 17 (09 Mar 2021 11:09) (17 - 18)  SpO2: 97% (09 Mar 2021 11:09) (96% - 97%)    LABS:                          12.0   9.33  )-----------( 415      ( 09 Mar 2021 10:34 )             36.6       03-09    138  |  103  |  15  ----------------------------<  157<H>  3.8   |  29  |  0.89    Ca    8.6      09 Mar 2021 10:34  Phos  3.9     03-09  Mg     1.6     03-09    TPro  7.5  /  Alb  3.0<L>  /  TBili  0.2  /  DBili  x   /  AST  78<H>  /  ALT  76  /  AlkPhos  146<H>  03-09      CAPILLARY BLOOD GLUCOSE      POCT Blood Glucose.: 153 mg/dL (09 Mar 2021 16:13)  POCT Blood Glucose.: 187 mg/dL (09 Mar 2021 11:34)  POCT Blood Glucose.: 139 mg/dL (09 Mar 2021 07:46)  POCT Blood Glucose.: 112 mg/dL (08 Mar 2021 21:20)          LOWER EXTREMITY PHYSICAL EXAM:    Vascular: DP/PT 2/4, B/L, CFT <3 seconds B/L, Temperature gradient WNL B/L.   Neuro: Epicritic sensation reduced to the level of digits, B/L.  Musculoskeletal/Ortho: unremarkable    Right foot plantar midfoot tenderness, mild edema to right foot. There is no open lesions, abrasions, or signs of infection.     RADIOLOGY & ADDITIONAL STUDIES:    < from: Xray Foot AP + Lateral + Oblique, Right (03.07.21 @ 11:25) >    EXAM:  FOOT COMPLETE  3 VWS  RT                            PROCEDURE DATE:  03/07/2021          INTERPRETATION:  Right foot pain rule out foreign body    3 views right foot.    No fracture or malalignment. Calcaneal enthesopathy.  degenerative change in the midfoot and right first metatarsophalangeal joint. No opaque foreign body. Vascular calcification.    Impression: No opaque foreign body.No acute osseous abnormality. Degenerative change.            JORGE MUELLER MD; Attending Radiologist  This document has been electronically signed. Mar  8 2021  9:21AM    < end of copied text >    < from: MR Foot w/wo IV Cont, Right (03.08.21 @ 13:19) >    EXAM:  MR FOOT WAW IC RT                            PROCEDURE DATE:  03/08/2021          INTERPRETATION:  RIGHT FOOT MRI    CLINICAL INFORMATION: Right foot pain. Cellulitis.  TECHNIQUE: Multiplanar, multisequence MRI was obtained of the right footwithout the administration of intravenous contrast. 8 cc of Gadavist were administered intravenously. 2 cc were discarded..    FINDINGS:    No marrow signal abnormality. No marrow enhancement or edema. Flexor and extensor tendons are intact. There isatrophy of the abductor digiti minimi. Remaining musculature is unremarkable. No fluid collection. Scarring is present in the sinus Tarsi. Degenerative spurring along the dorsal aspect of the talonavicular and navicular cuneiform articulations. Thereis diffuse mild subcutaneous edema.    IMPRESSION:  No osteomyelitis. No abscess.            BRANDON QUINTERO MD; Attending Radiologist  This document has been electronically signed. Mar  8 2021  1:47PM    < end of copied text >

## 2021-03-09 NOTE — CONSULT NOTE ADULT - ASSESSMENT
Pt is 49yo who presents w/ right foot pain  -pt was seen and examined  -Right foot plantar midfoot tenderness, mild edema to right foot. There is no open lesions, abrasions, or signs of infection.   -Xray and MRI of right foot negative for foreign body  -Rec Toradol for pain   -Weight bearing as tolerated to RF  -To f/u as outpatient w/n a week if symptoms persist  -Discussed w/ attending

## 2021-03-09 NOTE — PROGRESS NOTE BEHAVIORAL HEALTH - SUMMARY
Bipolar Disorder in remission with no current mood symptoms  - no acute psych sxs/issues  - has capacity to make her medical decision
Bipolar Disorder in remission with no current mood symptoms  - no acute psych sxs/issues  - has capacity to make her medical decision

## 2021-03-09 NOTE — PROGRESS NOTE BEHAVIORAL HEALTH - RISK ASSESSMENT
Chronic risk factors: mood disorder, prior 2 suicide attempts. Protective factors: young; female gender;  no hx of aggression/violence; no legal issues; no substance use; motivated for help; articulate; strong family support; stable domicile; access to health services. No acute risk factors identified

## 2021-03-09 NOTE — PROGRESS NOTE BEHAVIORAL HEALTH - NSBHCHARTREVIEWVS_PSY_A_CORE FT
T(C): 36.9 (03-09-21 @ 11:09), Max: 36.9 (03-09-21 @ 11:09)  HR: 72 (03-09-21 @ 11:09) (62 - 72)  BP: 108/75 (03-09-21 @ 11:09) (108/75 - 131/76)  RR: 17 (03-09-21 @ 11:09) (17 - 18)  SpO2: 97% (03-09-21 @ 11:09) (96% - 97%)
T(C): 36.8 (03-08-21 @ 13:49), Max: 36.8 (03-08-21 @ 13:49)  HR: 65 (03-08-21 @ 13:49) (65 - 97)  BP: 123/75 (03-08-21 @ 13:49) (93/51 - 137/68)  RR: 19 (03-08-21 @ 13:49) (18 - 19)  SpO2: 95% (03-08-21 @ 13:49) (95% - 98%)

## 2021-03-09 NOTE — PROGRESS NOTE BEHAVIORAL HEALTH - NSBHFUPINTERVALHXFT_PSY_A_CORE
No significant interval events. Patient is being treated for gastroenteritis and on Flagyl and Cipro. Some improvement in extent of nausea and vomiting as compared to admission. Same psychiatric clinical presentation  - calm, cooperative; endorses stable euthymic mood. Denies any symptoms of hypomania/patti/psychosis/major depression/ anxiety/panic. Denies any active or passive suicidal or homicidal ideation. Names protective factors (romeo; family; hope for future).  Denies substance use. Denies Zoloft withdrawal sxs.
Patient's GI sxs and complaints much better and she is requesting to resume the Zoloft as she is having hot flashes. Patient at this time Endorses stable euthymic mood, regular sleep / appetite / energy level / concentration / bathroom habits. Denies any symptoms of hypomania/patti/psychosis/major depression/ anxiety/panic. Denies any active or passive suicidal or homicidal ideation. Names protective factors (romeo; family; hope for future). Endorses medication compliance. Denies adverse medication side effects, Denies substance use.

## 2021-03-09 NOTE — PROGRESS NOTE BEHAVIORAL HEALTH - NSBHCONSULTMEDS_PSY_A_CORE FT
resume Zoloft 50mg PO qd as per Pt's request
continue to HOLD Zoloft for now as Patient feels like it exacerbated her nausea. She can resume Zoloft 50 mg PO qd as soon as her gastroenteritis is in remission

## 2021-03-09 NOTE — PROGRESS NOTE ADULT - ASSESSMENT
50 year old female PMH of DM, HTN, IBS, PSH of cholecystectomy hx presenting to ED due to abdominal pain, severe, nausea/vomiting and diarrhea since yesterday. Noted profuse vomiting and diarrhea which has been very active - to the point of using adult diapers at home. No fevers or abdominal pain.     Chronic diarrhea with N/V - Patient states that she has improved on a gluten free, lactose free diet. Patient is off Metformin  - R/O Celiac , microscopic/ collagenous colitis , gastrinoma, etc. - 1) Check Celiac panel 2) Maintain Gluten free diet 3) Stop Pregabalin  (?) ( 1 % chance to cause diarrhea ) 4) Check  HIAA 5) Check Gastrin level 5) treat for bacterial over growth (?)  6) Cholestyramine 7) EGD with duodenal sweep biopsy     c/o severe diffuse joint pain/swelling for the past year of unclear etiology.  Will r/o RA vs other inflammatory arthritis.  Given GI symptoms, the DDx also includes joint pains secondary to celiac or colitis.    - Will check labs and x-rays.    - f/u GI w/u.    - I start an NSAID (e.g naproxen 500mg BID) for now.        -Xray and MRI of right foot negative for foreign body  -Rec Toradol for pain   -Weight bearing as tolerated to RF  -To f/u as outpatient w/n a week if symptoms persist  -Discussed w/ attending

## 2021-03-10 LAB
ALBUMIN SERPL ELPH-MCNC: 2.8 G/DL — LOW (ref 3.3–5)
ALP SERPL-CCNC: 123 U/L — HIGH (ref 40–120)
ALT FLD-CCNC: 71 U/L — SIGNIFICANT CHANGE UP (ref 12–78)
ANION GAP SERPL CALC-SCNC: 5 MMOL/L — SIGNIFICANT CHANGE UP (ref 5–17)
AST SERPL-CCNC: 57 U/L — HIGH (ref 15–37)
BILIRUB SERPL-MCNC: 0.3 MG/DL — SIGNIFICANT CHANGE UP (ref 0.2–1.2)
BUN SERPL-MCNC: 17 MG/DL — SIGNIFICANT CHANGE UP (ref 7–23)
CALCIUM SERPL-MCNC: 8.7 MG/DL — SIGNIFICANT CHANGE UP (ref 8.5–10.1)
CHLORIDE SERPL-SCNC: 104 MMOL/L — SIGNIFICANT CHANGE UP (ref 96–108)
CO2 SERPL-SCNC: 30 MMOL/L — SIGNIFICANT CHANGE UP (ref 22–31)
CREAT SERPL-MCNC: 0.85 MG/DL — SIGNIFICANT CHANGE UP (ref 0.5–1.3)
CRP SERPL-MCNC: 8 MG/L — HIGH
CULTURE RESULTS: SIGNIFICANT CHANGE UP
ERYTHROCYTE [SEDIMENTATION RATE] IN BLOOD: 32 MM/HR — HIGH (ref 0–20)
GASTRIN SERPL-MCNC: 170 PG/ML — HIGH (ref 0–115)
GLUCOSE BLDC GLUCOMTR-MCNC: 117 MG/DL — HIGH (ref 70–99)
GLUCOSE BLDC GLUCOMTR-MCNC: 172 MG/DL — HIGH (ref 70–99)
GLUCOSE BLDC GLUCOMTR-MCNC: 194 MG/DL — HIGH (ref 70–99)
GLUCOSE BLDC GLUCOMTR-MCNC: 208 MG/DL — HIGH (ref 70–99)
GLUCOSE BLDC GLUCOMTR-MCNC: 236 MG/DL — HIGH (ref 70–99)
GLUCOSE SERPL-MCNC: 146 MG/DL — HIGH (ref 70–99)
HCT VFR BLD CALC: 34.9 % — SIGNIFICANT CHANGE UP (ref 34.5–45)
HGB BLD-MCNC: 11.3 G/DL — LOW (ref 11.5–15.5)
MAGNESIUM SERPL-MCNC: 1.7 MG/DL — SIGNIFICANT CHANGE UP (ref 1.6–2.6)
MCHC RBC-ENTMCNC: 29.9 PG — SIGNIFICANT CHANGE UP (ref 27–34)
MCHC RBC-ENTMCNC: 32.4 GM/DL — SIGNIFICANT CHANGE UP (ref 32–36)
MCV RBC AUTO: 92.3 FL — SIGNIFICANT CHANGE UP (ref 80–100)
NRBC # BLD: 0 /100 WBCS — SIGNIFICANT CHANGE UP (ref 0–0)
PHOSPHATE SERPL-MCNC: 3.7 MG/DL — SIGNIFICANT CHANGE UP (ref 2.5–4.5)
PLATELET # BLD AUTO: 398 K/UL — SIGNIFICANT CHANGE UP (ref 150–400)
POTASSIUM SERPL-MCNC: 3.9 MMOL/L — SIGNIFICANT CHANGE UP (ref 3.5–5.3)
POTASSIUM SERPL-SCNC: 3.9 MMOL/L — SIGNIFICANT CHANGE UP (ref 3.5–5.3)
PROT SERPL-MCNC: 6.7 GM/DL — SIGNIFICANT CHANGE UP (ref 6–8.3)
RAPID RVP RESULT: DETECTED
RBC # BLD: 3.78 M/UL — LOW (ref 3.8–5.2)
RBC # FLD: 13.3 % — SIGNIFICANT CHANGE UP (ref 10.3–14.5)
RHEUMATOID FACT SERPL-ACNC: 12 IU/ML — SIGNIFICANT CHANGE UP (ref 0–13)
SARS-COV-2 RNA SPEC QL NAA+PROBE: DETECTED
SODIUM SERPL-SCNC: 139 MMOL/L — SIGNIFICANT CHANGE UP (ref 135–145)
SPECIMEN SOURCE: SIGNIFICANT CHANGE UP
WBC # BLD: 9.92 K/UL — SIGNIFICANT CHANGE UP (ref 3.8–10.5)
WBC # FLD AUTO: 9.92 K/UL — SIGNIFICANT CHANGE UP (ref 3.8–10.5)

## 2021-03-10 PROCEDURE — 99232 SBSQ HOSP IP/OBS MODERATE 35: CPT

## 2021-03-10 PROCEDURE — 73130 X-RAY EXAM OF HAND: CPT | Mod: 26,LT,RT

## 2021-03-10 PROCEDURE — 73562 X-RAY EXAM OF KNEE 3: CPT | Mod: 26,LT,RT

## 2021-03-10 PROCEDURE — 73030 X-RAY EXAM OF SHOULDER: CPT | Mod: 26,LT,RT

## 2021-03-10 RX ORDER — TRAMADOL HYDROCHLORIDE 50 MG/1
50 TABLET ORAL EVERY 6 HOURS
Refills: 0 | Status: DISCONTINUED | OUTPATIENT
Start: 2021-03-10 | End: 2021-03-11

## 2021-03-10 RX ADMIN — Medication 150 MILLIGRAM(S): at 06:05

## 2021-03-10 RX ADMIN — MORPHINE SULFATE 4 MILLIGRAM(S): 50 CAPSULE, EXTENDED RELEASE ORAL at 10:25

## 2021-03-10 RX ADMIN — Medication 10 UNIT(S): at 18:27

## 2021-03-10 RX ADMIN — Medication 10 UNIT(S): at 08:07

## 2021-03-10 RX ADMIN — Medication 150 MILLIGRAM(S): at 17:42

## 2021-03-10 RX ADMIN — OXYCODONE AND ACETAMINOPHEN 2 TABLET(S): 5; 325 TABLET ORAL at 20:03

## 2021-03-10 RX ADMIN — Medication 200 MILLIGRAM(S): at 06:04

## 2021-03-10 RX ADMIN — INSULIN GLARGINE 30 UNIT(S): 100 INJECTION, SOLUTION SUBCUTANEOUS at 21:26

## 2021-03-10 RX ADMIN — PANTOPRAZOLE SODIUM 40 MILLIGRAM(S): 20 TABLET, DELAYED RELEASE ORAL at 12:00

## 2021-03-10 RX ADMIN — Medication 100 MILLIGRAM(S): at 06:04

## 2021-03-10 RX ADMIN — Medication 10 MILLIGRAM(S): at 06:03

## 2021-03-10 RX ADMIN — Medication 10 UNIT(S): at 11:59

## 2021-03-10 RX ADMIN — SERTRALINE 50 MILLIGRAM(S): 25 TABLET, FILM COATED ORAL at 08:07

## 2021-03-10 RX ADMIN — Medication 40 MILLIGRAM(S): at 06:03

## 2021-03-10 RX ADMIN — ENOXAPARIN SODIUM 40 MILLIGRAM(S): 100 INJECTION SUBCUTANEOUS at 12:00

## 2021-03-10 RX ADMIN — OXYCODONE AND ACETAMINOPHEN 2 TABLET(S): 5; 325 TABLET ORAL at 21:00

## 2021-03-10 RX ADMIN — CYCLOBENZAPRINE HYDROCHLORIDE 10 MILLIGRAM(S): 10 TABLET, FILM COATED ORAL at 18:27

## 2021-03-10 RX ADMIN — MORPHINE SULFATE 4 MILLIGRAM(S): 50 CAPSULE, EXTENDED RELEASE ORAL at 10:10

## 2021-03-10 NOTE — PROGRESS NOTE ADULT - ASSESSMENT
HPI:  50 year old female PMH of DM, morbid obesity, HTN, IBS, PSH of cholecystectomy hx presenting to ED due to abdominal pain, severe, nausea/vomiting and diarrhea since yesterday. Noted profuse vomiting and diarrhea which has been very active - to the point of using adult diapers at home. No fevers or abdominal pain.     In past admissions she responds well to IVF and IV ABX for few days.  (04 Mar 2021 18:55)  ---------- As Above -------------  The patient presents with a few days of N/V, abdominal pain and diarrhea. She has been having multiple episodes of the above since last year. The patient was to have an EGD  / colonoscopy but was postponed because of Coved. It is non bloody. The patient states that she has been having diarrhea constantly over the past year. A trial off Metformin did not help. A lactose free diet nor Cholestyramine did not help. The patient's daughter has a history of celiac disease. She is on a lactose free diet.  See labs / CT scan      Chronic diarrhea with N/V - Patient states that she has improved on a gluten free, lactose free diet. Patient is off Metformin  - R/O Celiac , microscopic/ collagenous colitis , gastrinoma, etc. - 1) Check Celiac panel 2) Maintain Gluten free diet 3) Stop Pregabalin  (?) ( 1 % chance to cause diarrhea ) 4) Check  HIAA 5) Check Gastrin level 5) treat for bacterial over growth (?)  6) Cholestyramine 7) EGD with duodenal sweep biopsy tomorrow HPI:  50 year old female PMH of DM, morbid obesity, HTN, IBS, PSH of cholecystectomy hx presenting to ED due to abdominal pain, severe, nausea/vomiting and diarrhea since yesterday. Noted profuse vomiting and diarrhea which has been very active - to the point of using adult diapers at home. No fevers or abdominal pain.     In past admissions she responds well to IVF and IV ABX for few days.  (04 Mar 2021 18:55)  ---------- As Above -------------  The patient presents with a few days of N/V, abdominal pain and diarrhea. She has been having multiple episodes of the above since last year. The patient was to have an EGD  / colonoscopy but was postponed because of Coved. It is non bloody. The patient states that she has been having diarrhea constantly over the past year. A trial off Metformin did not help. A lactose free diet nor Cholestyramine did not help. The patient's daughter has a history of celiac disease. She is on a lactose free diet.  See labs / CT scan      Chronic diarrhea with N/V - Patient states that she has improved on a gluten free, lactose free diet. Patient is off Metformin  - R/O Celiac , microscopic/ collagenous colitis , gastrinoma, etc. - 1) Check Celiac panel 2) Maintain Gluten free diet 3) Stop Pregabalin  (?) ( 1 % chance to cause diarrhea ) 4) Check  HIAA 5) Check Gastrin level 5) treat for bacterial over growth (?)  6) Cholestyramine 7) EGD with duodenal sweep biopsy tomorrow  Elevated LFTs - now normal  0.3/57/71/123

## 2021-03-10 NOTE — PROGRESS NOTE ADULT - ASSESSMENT
50 year old female PMH of DM, HTN, IBS, PSH of cholecystectomy hx presenting to ED due to abdominal pain, severe, nausea/vomiting and diarrhea since yesterday. Noted profuse vomiting and diarrhea which has been very active - to the point of using adult diapers at home. No fevers or abdominal pain.     Chronic diarrhea with N/V - improved with gluten free diet. cont current symptomatic treatment. GI following. NPO after MN for EGD tomorrow. cont iv PPI for now. also DC antibiotics as patient likely did not have any infectious colitis.      c/o severe diffuse joint pain/swelling for the past year of unclear etiology.  appreciated rheumatology consult recs. follow X ray hands, shoulder and knees.   -Xray and MRI of right foot negative for foreign body  - cont percocet for pain for now. DC iv morphine.     Hx of bipolar disorder. appreciated psychiatry consult recs. cont zoloft.     Morbid obesity. outpatient bariatric assessment.     DM type 2. controlled. cont current management. follow finger sticks    HTN. controlled. cont current meds. monitor.     Home with HHA.           Nutritional Assessment:  · Nutritional Assessment	This patient has been assessed with a concern for Malnutrition and has been determined to have a diagnosis/diagnoses of Morbid obesity (BMI > 40).    This patient is being managed with:   Diet Soft-  Consistent Carbohydrate {No Snacks}  Lactose Restricted (Milk Sugar Intoler.)  Gluten-Gliadin Restricted  Entered: Mar  8 2021  7:27PM

## 2021-03-11 ENCOUNTER — RESULT REVIEW (OUTPATIENT)
Age: 51
End: 2021-03-11

## 2021-03-11 LAB
24R-OH-CALCIDIOL SERPL-MCNC: 12.2 NG/ML — LOW (ref 30–80)
ANA PAT FLD IF-IMP: ABNORMAL
ANA TITR SER: ABNORMAL
CCP IGG SERPL-ACNC: <8 UNITS — SIGNIFICANT CHANGE UP
GLUCOSE BLDC GLUCOMTR-MCNC: 102 MG/DL — HIGH (ref 70–99)
GLUCOSE BLDC GLUCOMTR-MCNC: 137 MG/DL — HIGH (ref 70–99)
GLUCOSE BLDC GLUCOMTR-MCNC: 143 MG/DL — HIGH (ref 70–99)
GLUCOSE BLDC GLUCOMTR-MCNC: 145 MG/DL — HIGH (ref 70–99)
RF+CCP IGG SER-IMP: NEGATIVE — SIGNIFICANT CHANGE UP

## 2021-03-11 PROCEDURE — 88305 TISSUE EXAM BY PATHOLOGIST: CPT | Mod: 26

## 2021-03-11 PROCEDURE — 88312 SPECIAL STAINS GROUP 1: CPT | Mod: 26

## 2021-03-11 PROCEDURE — 99232 SBSQ HOSP IP/OBS MODERATE 35: CPT

## 2021-03-11 RX ADMIN — TRAMADOL HYDROCHLORIDE 50 MILLIGRAM(S): 50 TABLET ORAL at 21:30

## 2021-03-11 RX ADMIN — SERTRALINE 50 MILLIGRAM(S): 25 TABLET, FILM COATED ORAL at 08:18

## 2021-03-11 RX ADMIN — PANTOPRAZOLE SODIUM 40 MILLIGRAM(S): 20 TABLET, DELAYED RELEASE ORAL at 12:45

## 2021-03-11 RX ADMIN — Medication 40 MILLIGRAM(S): at 05:14

## 2021-03-11 RX ADMIN — INSULIN GLARGINE 30 UNIT(S): 100 INJECTION, SOLUTION SUBCUTANEOUS at 22:20

## 2021-03-11 RX ADMIN — CYCLOBENZAPRINE HYDROCHLORIDE 10 MILLIGRAM(S): 10 TABLET, FILM COATED ORAL at 18:12

## 2021-03-11 RX ADMIN — TRAMADOL HYDROCHLORIDE 50 MILLIGRAM(S): 50 TABLET ORAL at 06:00

## 2021-03-11 RX ADMIN — CYCLOBENZAPRINE HYDROCHLORIDE 10 MILLIGRAM(S): 10 TABLET, FILM COATED ORAL at 23:04

## 2021-03-11 RX ADMIN — TRAMADOL HYDROCHLORIDE 50 MILLIGRAM(S): 50 TABLET ORAL at 05:19

## 2021-03-11 RX ADMIN — Medication 150 MILLIGRAM(S): at 05:14

## 2021-03-11 RX ADMIN — Medication 10 MILLIGRAM(S): at 05:14

## 2021-03-11 RX ADMIN — Medication 150 MILLIGRAM(S): at 18:05

## 2021-03-11 NOTE — PROGRESS NOTE ADULT - ASSESSMENT
HPI:  50 year old female PMH of DM, morbid obesity, HTN, IBS, PSH of cholecystectomy hx presenting to ED due to abdominal pain, severe, nausea/vomiting and diarrhea since yesterday. Noted profuse vomiting and diarrhea which has been very active - to the point of using adult diapers at home. No fevers or abdominal pain.     In past admissions she responds well to IVF and IV ABX for few days.  (04 Mar 2021 18:55)  ---------- As Above -------------  The patient presents with a few days of N/V, abdominal pain and diarrhea. She has been having multiple episodes of the above since last year. The patient was to have an EGD  / colonoscopy but was postponed because of Coved. It is non bloody. The patient states that she has been having diarrhea constantly over the past year. A trial off Metformin did not help. A lactose free diet nor Cholestyramine did not help. The patient's daughter has a history of celiac disease. She is on a lactose free diet.  See labs / CT scan      Chronic diarrhea with N/V - RESOLVED Patient states that she has improved on a gluten free, lactose free diet. Patient is off Metformin  - R/O Celiac , microscopic/ collagenous colitis , gastrinoma ( mildly elevated level ) , etc. - 1) Check Celiac panel 2) Maintain Gluten free diet 3) Stop Pregabalin  (?) ( 1 % chance to cause diarrhea ) 4) Check  HIAA 5)  Cholestyramine 6) EGD with duodenal sweep biopsy as out patient. EGD cancelled by Anesthesia because patient is Coved (+)   Elevated LFTs - now normal  0.3/57/71/123

## 2021-03-11 NOTE — BRIEF OPERATIVE NOTE - NSICDXBRIEFPREOP_GEN_ALL_CORE_FT
PRE-OP DIAGNOSIS:  Chronic diarrhea 11-Mar-2021 17:21:59  Isael Menchaca  Nausea and vomiting 11-Mar-2021 17:21:40  Isael Menchaca

## 2021-03-11 NOTE — PROGRESS NOTE ADULT - NUTRITIONAL ASSESSMENT
This patient has been assessed with a concern for Malnutrition and has been determined to have a diagnosis/diagnoses of Morbid obesity (BMI > 40).    This patient is being managed with:   Diet Soft-  Consistent Carbohydrate {No Snacks}  Lactose Restricted (Milk Sugar Intoler.)  Gluten-Gliadin Restricted  Entered: Mar  8 2021  7:27PM    
This patient has been assessed with a concern for Malnutrition and has been determined to have a diagnosis/diagnoses of Morbid obesity (BMI > 40).    This patient is being managed with:   Diet Soft-  Consistent Carbohydrate {No Snacks}  Lactose Restricted (Milk Sugar Intoler.)  Gluten-Gliadin Restricted  Entered: Mar  8 2021  7:27PM      This patient has been assessed with a concern for Malnutrition and has been determined to have a diagnosis/diagnoses of Morbid obesity (BMI > 40).    This patient is being managed with:   Diet NPO-  NPO for Procedure/Test     NPO Start Date: 11-Mar-2021   NPO Start Time: 13:00  Entered: Mar 11 2021  3:17PM    
This patient has been assessed with a concern for Malnutrition and has been determined to have a diagnosis/diagnoses of Morbid obesity (BMI > 40).    This patient is being managed with:   Diet Soft-  Consistent Carbohydrate {No Snacks}  Lactose Restricted (Milk Sugar Intoler.)  Gluten-Gliadin Restricted  Entered: Mar  8 2021  7:27PM    
This patient has been assessed with a concern for Malnutrition and has been determined to have a diagnosis/diagnoses of Morbid obesity (BMI > 40).    This patient is being managed with:   Diet Consistent Carbohydrate w/Evening Snack-  Soft  Fiber/Residue Restricted  Low Sodium  Entered: Mar  8 2021  3:34PM

## 2021-03-11 NOTE — PROGRESS NOTE ADULT - ASSESSMENT
50 year old female PMH of DM, HTN, IBS, PSH of cholecystectomy hx presenting to ED due to abdominal pain, severe, nausea/vomiting and diarrhea since yesterday. Noted profuse vomiting and diarrhea which has been very active - to the point of using adult diapers at home. No fevers or abdominal pain.     Chronic diarrhea with N/V - improved with gluten free diet. NPO for EGD today. I spoke with Dr Navarro, Dr Reis, Dr French and Dr Menchaca. Patient is at low risk for EGD, so can proceed with it. follow post procedure recs.      c/o severe diffuse joint pain/swelling for the past year of unclear etiology.  appreciated rheumatology consult recs. mild arthritic changes on X ray knees. no acute bony abnormalities on X ray shoulder and hands.     Hx of bipolar disorder. appreciated psychiatry consult recs. cont zoloft.     Morbid obesity. outpatient bariatric assessment.     DM type 2. controlled. cont current management. follow finger sticks    HTN. controlled. cont current meds. monitor.     Home with HHA.           Nutritional Assessment:  · Nutritional Assessment	This patient has been assessed with a concern for Malnutrition and has been determined to have a diagnosis/diagnoses of Morbid obesity (BMI > 40).    This patient is being managed with:   Diet Soft-  Consistent Carbohydrate {No Snacks}  Lactose Restricted (Milk Sugar Intoler.)  Gluten-Gliadin Restricted  Entered: Mar  8 2021  7:27PM

## 2021-03-12 ENCOUNTER — TRANSCRIPTION ENCOUNTER (OUTPATIENT)
Age: 51
End: 2021-03-12

## 2021-03-12 VITALS
DIASTOLIC BLOOD PRESSURE: 69 MMHG | SYSTOLIC BLOOD PRESSURE: 137 MMHG | HEART RATE: 72 BPM | TEMPERATURE: 98 F | OXYGEN SATURATION: 98 % | RESPIRATION RATE: 19 BRPM

## 2021-03-12 LAB
ENDOMYSIUM IGA TITR SER IF: NEGATIVE — SIGNIFICANT CHANGE UP
ENDOMYSIUM IGA TITR SER: SIGNIFICANT CHANGE UP
GLUCOSE BLDC GLUCOMTR-MCNC: 155 MG/DL — HIGH (ref 70–99)
GLUCOSE BLDC GLUCOMTR-MCNC: 176 MG/DL — HIGH (ref 70–99)
LAB BILL ONLY ITEM: SIGNIFICANT CHANGE UP

## 2021-03-12 PROCEDURE — 99239 HOSP IP/OBS DSCHRG MGMT >30: CPT

## 2021-03-12 RX ORDER — METFORMIN HYDROCHLORIDE 850 MG/1
1 TABLET ORAL
Qty: 0 | Refills: 0 | DISCHARGE

## 2021-03-12 RX ADMIN — Medication 10 MILLIGRAM(S): at 05:58

## 2021-03-12 RX ADMIN — Medication 40 MILLIGRAM(S): at 05:58

## 2021-03-12 RX ADMIN — SERTRALINE 50 MILLIGRAM(S): 25 TABLET, FILM COATED ORAL at 08:22

## 2021-03-12 RX ADMIN — PANTOPRAZOLE SODIUM 40 MILLIGRAM(S): 20 TABLET, DELAYED RELEASE ORAL at 11:48

## 2021-03-12 RX ADMIN — OXYCODONE AND ACETAMINOPHEN 2 TABLET(S): 5; 325 TABLET ORAL at 06:15

## 2021-03-12 RX ADMIN — ENOXAPARIN SODIUM 40 MILLIGRAM(S): 100 INJECTION SUBCUTANEOUS at 11:48

## 2021-03-12 RX ADMIN — Medication 10 UNIT(S): at 11:48

## 2021-03-12 RX ADMIN — Medication 10 UNIT(S): at 08:23

## 2021-03-12 NOTE — PROGRESS NOTE ADULT - PROVIDER SPECIALTY LIST ADULT
Gastroenterology
Gastroenterology
Hospitalist
Hospitalist
Gastroenterology
Hospitalist
Gastroenterology
Gastroenterology
Hospitalist

## 2021-03-12 NOTE — DISCHARGE NOTE NURSING/CASE MANAGEMENT/SOCIAL WORK - PATIENT PORTAL LINK FT
You can access the FollowMyHealth Patient Portal offered by [facility] by registering at the following website: [email or web address]. By joining [facility]’s FollowMyHealth portal, you will also be able to view your health information using other applications (apps) compatible with our system.

## 2021-03-12 NOTE — PROGRESS NOTE ADULT - ASSESSMENT
HPI:  50 year old female PMH of DM, morbid obesity, HTN, IBS, PSH of cholecystectomy hx presenting to ED due to abdominal pain, severe, nausea/vomiting and diarrhea since yesterday. Noted profuse vomiting and diarrhea which has been very active - to the point of using adult diapers at home. No fevers or abdominal pain.     In past admissions she responds well to IVF and IV ABX for few days.  (04 Mar 2021 18:55)  ---------- As Above -------------  The patient presents with a few days of N/V, abdominal pain and diarrhea. She has been having multiple episodes of the above since last year. The patient was to have an EGD  / colonoscopy but was postponed because of Coved. It is non bloody. The patient states that she has been having diarrhea constantly over the past year. A trial off Metformin did not help. A lactose free diet nor Cholestyramine did not help. The patient's daughter has a history of celiac disease. She is on a lactose free diet.  See labs / CT scan      Chronic diarrhea with N/V - RESOLVED Patient states that she has improved on a gluten free, lactose free diet. Patient is off Metformin, EGD essentially negative. Biopsy results pending.   - R/O Celiac , microscopic/ collagenous colitis , gastrinoma ( mildly elevated level ) , etc. - 1) Check Celiac panel 2) Maintain Gluten free diet 3) Stop Pregabalin  (?) ( 1 % chance to cause diarrhea ) 4) Check  HIAA 5)  Cholestyramine 6) Check histology 7)  Stable from GI point of view for discharge Patient knows to A) Don't restart Metformin ( Patient knows to immediately speak with PMD ) B) Gluten free / lactose free diet ( Patient was seen by nutrition )  EGD with duodenal sweep biopsy as out patient.  Elevated LFTs - now normal  0.3/57/71/123

## 2021-03-12 NOTE — PROGRESS NOTE ADULT - SUBJECTIVE AND OBJECTIVE BOX
Procedure:           Upper GI endoscopy with biopsy  03-11-21 @ 17:23    Indications:                 R/O celiac    Monitored Anesthesia Care Provided by :     ____________________________________________________________________________________________________  Procedure:           Pre-Anesthesia Assessment:                       - Prior to the procedure, a History and Physical was performed, and patient                        medications and allergies were reviewed. The patient is competent. The risks                        and benefits of the procedure and the sedation options and risks were                        discussed with the patient. All questions were answered and informed consent                        was obtained. Patient identification and proposed procedure were verified by                        the physician, the nurse and the anesthesiologist in the procedure room.                        Mental Status Examination:  alert and oriented. Airway Examination: normal                        oropharyngeal airway and neck mobility. Respiratory Examination: clear to                        auscultation. CV Examination: normal. Prophylactic Antibiotics:  -The patient                        does not require prophylactic antibiotics.                        Grade Assessment:  After                        reviewing the risks and benefits, the patient was deemed in satisfactory                        condition to undergo the procedure. The anesthesia plan was to use monitored                        anesthesia care (MAC). Immediately prior to administration of medications,                        the patient was re-assessed for adequacy to receive sedatives. The heart        		     rate, respiratory rate, oxygen saturations, blood pressure, adequacy of                        pulmonary ventilation, and response to care were monitored throughout the                        procedure. The physical status of the patient was re-assessed after the                        procedure.                       After obtaining informed consent, the endoscope was passed under direct                        vision. Throughout the procedure, the patient's blood pressure, pulse, and                        oxygen saturations were monitored continuously. The Endoscope was introduced                        through the mouth, and advanced to the second part of duodenum. The upper GI                        endoscopy was accomplished with ease. The patient tolerated the procedure                        well.    ESOPHAGUS:   WNL    STOMACH:   Mild antral gastritis s/p biopsy    DUODENUM:   WNL s/p biopsy      Assessment :     PLAN :  
HPI:  50 year old female PMH of DM, morbid obesity, HTN, IBS, PSH of cholecystectomy hx presenting to ED due to abdominal pain, severe, nausea/vomiting and diarrhea since yesterday. Noted profuse vomiting and diarrhea which has been very active - to the point of using adult diapers at home. No fevers or abdominal pain.     In past admissions she responds well to IVF and IV ABX for few days.  (04 Mar 2021 18:55)    Patient is a 50y old  Female who presents with a chief complaint of     INTERVAL HPI/OVERNIGHT EVENTS: errol cute events     MEDICATIONS  (STANDING):  ciprofloxacin   IVPB 400 milliGRAM(s) IV Intermittent every 12 hours  enalapril 10 milliGRAM(s) Oral daily  enoxaparin Injectable 40 milliGRAM(s) SubCutaneous daily  insulin glargine Injectable (LANTUS) 30 Unit(s) SubCutaneous at bedtime  insulin lispro Injectable (ADMELOG) 10 Unit(s) SubCutaneous three times a day before meals  lactated ringers. 1000 milliLiter(s) (100 mL/Hr) IV Continuous <Continuous>  metroNIDAZOLE  IVPB      metroNIDAZOLE  IVPB 500 milliGRAM(s) IV Intermittent every 8 hours  pantoprazole  Injectable 40 milliGRAM(s) IV Push daily  pregabalin 150 milliGRAM(s) Oral two times a day    MEDICATIONS  (PRN):  aluminum hydroxide/magnesium hydroxide/simethicone Suspension 30 milliLiter(s) Oral every 6 hours PRN Dyspepsia  cyclobenzaprine 10 milliGRAM(s) Oral two times a day PRN Muscle Spasm  loperamide 2 milliGRAM(s) Oral four times a day PRN loose BM  morphine  - Injectable 4 milliGRAM(s) IV Push every 4 hours PRN Severe Pain (7 - 10)  morphine  - Injectable 2 milliGRAM(s) IV Push every 6 hours PRN Moderate Pain (4 - 6)  ondansetron Injectable 4 milliGRAM(s) IV Push every 6 hours PRN Nausea and/or Vomiting  oxycodone    5 mG/acetaminophen 325 mG 2 Tablet(s) Oral every 4 hours PRN Mild Pain (1 - 3)  simethicone 80 milliGRAM(s) Chew three times a day PRN Gas      Allergies    amitriptyline (Other)    Intolerances        REVIEW OF SYSTEMS:  CONSTITUTIONAL: No fever, weight loss, or fatigue  EYES: No eye pain, visual disturbances, or discharge  ENMT:  No difficulty hearing, tinnitus, vertigo; No sinus or throat pain  NECK: No pain or stiffness  BREASTS: No pain, masses, or nipple discharge  RESPIRATORY: No cough, wheezing, chills or hemoptysis; No shortness of breath  CARDIOVASCULAR: No chest pain, palpitations, dizziness, or leg swelling  GASTROINTESTINAL: No abdominal or epigastric pain. No nausea, vomiting, or hematemesis; No diarrhea or constipation. No melena or hematochezia.  GENITOURINARY: No dysuria, frequency, hematuria, or incontinence  NEUROLOGICAL: No headaches, memory loss, loss of strength, numbness, or tremors  SKIN: No itching, burning, rashes, or lesions   LYMPH NODES: No enlarged glands  ENDOCRINE: No heat or cold intolerance; No hair loss  MUSCULOSKELETAL: No joint pain or swelling; No muscle, back, or extremity pain  PSYCHIATRIC: No depression, anxiety, mood swings, or difficulty sleeping  HEME/LYMPH: No easy bruising, or bleeding gums  ALLERGY AND IMMUNOLOGIC: No hives or eczema    Vital Signs Last 24 Hrs  T(C): 36.7 (05 Mar 2021 12:00), Max: 36.7 (05 Mar 2021 12:00)  T(F): 98.1 (05 Mar 2021 12:00), Max: 98.1 (05 Mar 2021 12:00)  HR: 64 (05 Mar 2021 17:23) (57 - 64)  BP: 146/75 (05 Mar 2021 17:23) (115/70 - 146/75)  RR: 18 (05 Mar 2021 17:23) (18 - 18)  SpO2: 100% (05 Mar 2021 17:23) (98% - 100%)    PHYSICAL EXAM:  GENERAL: NAD, well-groomed, well-developed  HEAD:  Atraumatic, Normocephalic  EYES: EOMI, PERRLA, conjunctiva and sclera clear  ENMT: No tonsillar erythema, exudates, or enlargement; Moist mucous membranes, Good dentition, No lesions  NECK: Supple, No JVD, Normal thyroid  NERVOUS SYSTEM:  Alert & Oriented X3, Good concentration; Motor Strength 5/5 B/L upper and lower extremities; DTRs 2+ intact and symmetric  CHEST/LUNG: Clear to percussion bilaterally; No rales, rhonchi, wheezing, or rubs  HEART: Regular rate and rhythm; No murmurs, rubs, or gallops  ABDOMEN: Soft,tender  EXTREMITIES:  2+ Peripheral Pulses, No clubbing, cyanosis, or edema  LYMPH: No lymphadenopathy noted  SKIN: evidence of cuttingg   LABS:                        11.4   10.48 )-----------( 431      ( 05 Mar 2021 06:16 )             35.6         139  |  105  |  13  ----------------------------<  76  3.6   |  28  |  0.73    Ca    8.3<L>      05 Mar 2021 06:16    TPro  8.1  /  Alb  3.2<L>  /  TBili  0.4  /  DBili  x   /  AST  23  /  ALT  29  /  AlkPhos  139<H>        Urinalysis Basic - ( 04 Mar 2021 17:31 )    Color: Yellow / Appearance: Clear / S.015 / pH: x  Gluc: x / Ketone: Negative  / Bili: Negative / Urobili: Negative mg/dL   Blood: x / Protein: 15 mg/dL / Nitrite: Negative   Leuk Esterase: Small / RBC: 0-2 /HPF / WBC 0-2   Sq Epi: x / Non Sq Epi: Few / Bacteria: Negative      CAPILLARY BLOOD GLUCOSE      POCT Blood Glucose.: 105 mg/dL (05 Mar 2021 16:31)  POCT Blood Glucose.: 119 mg/dL (05 Mar 2021 10:53)  POCT Blood Glucose.: 140 mg/dL (05 Mar 2021 09:07)  POCT Blood Glucose.: 69 mg/dL (05 Mar 2021 07:53)  POCT Blood Glucose.: 81 mg/dL (04 Mar 2021 22:14)  POCT Blood Glucose.: 80 mg/dL (04 Mar 2021 20:04)      RADIOLOGY & ADDITIONAL TESTS:  < from: CT Abdomen and Pelvis w/ IV Cont (21 @ 18:13) >  PROCEDURE DATE:  2021          INTERPRETATION:  CLINICAL INFORMATION: Vomiting diarrhea    COMPARISON: 10/10/2020.    CONTRAST/COMPLICATIONS:  IV Contrast: Omnipaque 350 90 cc administered / .  Oral Contrast: NONE  Complications: None reported at time of study completion    PROCEDURE:  CT of the Abdomen and Pelvis was performed.  Sagittal and coronal reformats were performed.    FINDINGS:  LOWER CHEST: Within normal limits.    LIVER: Within normal limits.  BILE DUCTS: Normal caliber.  GALLBLADDER: Within normal limits.  SPLEEN: Within normal limits.  PANCREAS: Within normal limits.  ADRENALS: Within normal limits.  KIDNEYS/URETERS: Within normal limits.    BLADDER: Within normal limits.  REPRODUCTIVE ORGANS: IUD in situ    BOWEL: Scattered prominent fluid-filled loops of small and large bowel suggesting diarrheal state likely a nonspecific mild enterocolitis. Appendix normal  PERITONEUM: No ascites.  VESSELS: Nonaneurysmal.  RETROPERITONEUM/LYMPH NODES: No lymphadenopathy.  ABDOMINAL WALL: Within normal limits.  BONES: No acute or aggressive pathology.    IMPRESSION:  Diarrheal state. Likely mild nonspecific enterocolitis. No obstructive pathology      < end of copied text >    Imaging Personally Reviewed:  [ X] YES  [ ] NO    Consultant(s) Notes Reviewed:  [ X] YES  [ ] NO    Care Discussed with Consultants/Other Providers [X ] YES  [ ] NO
Patient is a 50y old  Female who presents with a chief complaint of abdominal pain (09 Mar 2021 19:11)      HPI:  50 year old female PMH of DM, morbid obesity, HTN, IBS, PSH of cholecystectomy hx presenting to ED due to abdominal pain, severe, nausea/vomiting and diarrhea since yesterday. Noted profuse vomiting and diarrhea which has been very active - to the point of using adult diapers at home. No fevers or abdominal pain.     In past admissions she responds well to IVF and IV ABX for few days.  (04 Mar 2021 18:55)      INTERVAL HPI/OVERNIGHT EVENTS:  Diarrhea, N/V, and abdominal pain resolved. Heartburn (+)    MEDICATIONS  (STANDING):  enalapril 10 milliGRAM(s) Oral daily  enoxaparin Injectable 40 milliGRAM(s) SubCutaneous daily  furosemide    Tablet 40 milliGRAM(s) Oral daily  insulin glargine Injectable (LANTUS) 30 Unit(s) SubCutaneous at bedtime  insulin lispro Injectable (ADMELOG) 10 Unit(s) SubCutaneous three times a day before meals  pantoprazole  Injectable 40 milliGRAM(s) IV Push daily  pregabalin 150 milliGRAM(s) Oral two times a day  sertraline 50 milliGRAM(s) Oral <User Schedule>    MEDICATIONS  (PRN):  aluminum hydroxide/magnesium hydroxide/simethicone Suspension 30 milliLiter(s) Oral every 6 hours PRN Dyspepsia  cyclobenzaprine 10 milliGRAM(s) Oral two times a day PRN Muscle Spasm  loperamide 2 milliGRAM(s) Oral four times a day PRN loose BM  ondansetron Injectable 4 milliGRAM(s) IV Push every 6 hours PRN Nausea and/or Vomiting  oxycodone    5 mG/acetaminophen 325 mG 2 Tablet(s) Oral every 4 hours PRN Mild Pain (1 - 3)  simethicone 80 milliGRAM(s) Chew three times a day PRN Gas  traMADol 50 milliGRAM(s) Oral every 6 hours PRN Severe Pain (7 - 10)      FAMILY HISTORY:  No pertinent family history in first degree relatives    No pertinent family history in first degree relatives        Allergies    amitriptyline (Other)    Intolerances        PMH/PSH:  IBS (irritable bowel syndrome)    Essential hypertension    Diabetes    Cardiac abnormality    Neuralgia    Marijuana smoker, continuous    Former cigarette smoker    Obesity    Neuropathy    HTN (hypertension)    Diabetes    No significant past surgical history    History of hand surgery    History of cholecystectomy          REVIEW OF SYSTEMS:  CONSTITUTIONAL: No fever, weight loss,   EYES: No eye pain, visual disturbances, or discharge  ENMT:  No difficulty hearing, tinnitus, vertigo; No sinus or throat pain  NECK: No pain or stiffness  BREASTS: No pain, masses, or nipple discharge  RESPIRATORY: No cough, wheezing, chills or hemoptysis; No shortness of breath  CARDIOVASCULAR: No chest pain, palpitations, dizziness, or leg swelling  GASTROINTESTINAL: See above  GENITOURINARY: No dysuria, frequency, hematuria, or incontinence  NEUROLOGICAL: No headaches, memory loss, loss of strength, numbness, or tremors  SKIN: No itching, burning, rashes, or lesions   LYMPH NODES: No enlarged glands  ENDOCRINE: No heat or cold intolerance; No hair loss  MUSCULOSKELETAL: No joint pain or swelling; No muscle, back, or extremity pain  PSYCHIATRIC: No depression, anxiety, mood swings, or difficulty sleeping  HEME/LYMPH: No easy bruising, or bleeding gums  ALLERGY AND IMMUNOLOGIC: No hives or eczema    Vital Signs Last 24 Hrs  T(C): 36.9 (11 Mar 2021 11:28), Max: 36.9 (11 Mar 2021 11:28)  T(F): 98.4 (11 Mar 2021 11:28), Max: 98.4 (11 Mar 2021 11:28)  HR: 60 (11 Mar 2021 11:28) (60 - 78)  BP: 117/61 (11 Mar 2021 11:28) (112/69 - 144/75)  BP(mean): --  RR: 17 (11 Mar 2021 11:28) (17 - 18)  SpO2: 96% (11 Mar 2021 11:28) (96% - 97%)    PHYSICAL EXAM:  GENERAL: NAD, well-groomed, well-developed  HEAD:  Atraumatic, Normocephalic  EYES: EOMI, PERRLA, conjunctiva and sclera clear  NECK: Supple, No JVD, Normal thyroid  NERVOUS SYSTEM:  Alert & Oriented X3, Good concentration; Motor Strength 5/5 B/L upper and lower extremities;   CHEST/LUNG: Clear to percussion bilaterally; No rales, rhonchi, wheezing, or rubs  HEART: Regular rate and rhythm; No murmurs, rubs, or gallops  ABDOMEN: Soft, Nontender, Nondistended; Bowel sounds present  EXTREMITIES:  2+ Peripheral Pulses, No clubbing, cyanosis, or edema  LYMPH: No lymphadenopathy noted  SKIN: No rashes or lesions    LAB                          11.3   9.92  )-----------( 398      ( 10 Mar 2021 07:46 )             34.9       CBC:  03-10 @ 07:46  WBC 9.92   Hgb 11.3   Hct 34.9   Plts 398  MCV 92.3  03-09 @ 10:34  WBC 9.33   Hgb 12.0   Hct 36.6   Plts 415  MCV 92.2  03-08 @ 06:56  WBC 8.71   Hgb 10.8   Hct 33.8   Plts 387  MCV 92.6  03-07 @ 07:14  WBC 7.25   Hgb 11.6   Hct 36.4   Plts 407  MCV 92.2  03-06 @ 07:37  WBC 8.39   Hgb 11.4   Hct 34.6   Plts 425  MCV 90.3  03-05 @ 06:16  WBC 10.48   Hgb 11.4   Hct 35.6   Plts 431  MCV 91.5  03-04 @ 14:36  WBC 16.52   Hgb 12.1   Hct 36.9   Plts 504  MCV 90.2      Chemistry:  03-10 @ 07:46  Na+ 139  K+ 3.9  Cl- 104  CO2 30  BUN 17  Cr 0.85     03-09 @ 10:34  Na+ 138  K+ 3.8  Cl- 103  CO2 29  BUN 15  Cr 0.89     03-08 @ 06:56  Na+ 137  K+ 3.8  Cl- 102  CO2 29  BUN 14  Cr 0.86     03-07 @ 07:14  Na+ 137  K+ 3.7  Cl- 102  CO2 28  BUN 10  Cr 0.82     03-06 @ 07:37  Na+ 141  K+ 3.6  Cl- 107  CO2 28  BUN 11  Cr 0.77     03-05 @ 06:16  Na+ 139  K+ 3.6  Cl- 105  CO2 28  BUN 13  Cr 0.73     03-04 @ 14:36  Na+ 140  K+ 4.4  Cl- 106  CO2 26  BUN 16  Cr 0.67         Glucose, Serum: 146 mg/dL (03-10 @ 07:46)  Glucose, Serum: 157 mg/dL (03-09 @ 10:34)  Glucose, Serum: 147 mg/dL (03-08 @ 06:56)  Glucose, Serum: 128 mg/dL (03-07 @ 07:14)  Glucose, Serum: 103 mg/dL (03-06 @ 07:37)  Glucose, Serum: 76 mg/dL (03-05 @ 06:16)  Glucose, Serum: 97 mg/dL (03-04 @ 14:36)      10 Mar 2021 07:46    139    |  104    |  17     ----------------------------<  146    3.9     |  30     |  0.85   09 Mar 2021 10:34    138    |  103    |  15     ----------------------------<  157    3.8     |  29     |  0.89   08 Mar 2021 06:56    137    |  102    |  14     ----------------------------<  147    3.8     |  29     |  0.86   07 Mar 2021 07:14    137    |  102    |  10     ----------------------------<  128    3.7     |  28     |  0.82   06 Mar 2021 07:37    141    |  107    |  11     ----------------------------<  103    3.6     |  28     |  0.77   05 Mar 2021 06:16    139    |  105    |  13     ----------------------------<  76     3.6     |  28     |  0.73   04 Mar 2021 14:36    140    |  106    |  16     ----------------------------<  97     4.4     |  26     |  0.67     Ca    8.7        10 Mar 2021 07:46  Ca    8.6        09 Mar 2021 10:34  Ca    8.3        08 Mar 2021 06:56  Ca    8.4        07 Mar 2021 07:14  Ca    8.6        06 Mar 2021 07:37  Ca    8.3        05 Mar 2021 06:16  Ca    8.8        04 Mar 2021 14:36  Phos  3.7       10 Mar 2021 07:46  Phos  3.9       09 Mar 2021 10:34  Phos  3.7       08 Mar 2021 06:56  Phos  3.6       07 Mar 2021 07:14  Phos  3.7       06 Mar 2021 07:37  Mg     1.7       10 Mar 2021 07:46  Mg     1.6       09 Mar 2021 10:34  Mg     1.8       08 Mar 2021 06:56  Mg     1.8       07 Mar 2021 07:14  Mg     1.6       06 Mar 2021 07:37    TPro  6.7    /  Alb  2.8    /  TBili  0.3    /  DBili  x      /  AST  57     /  ALT  71     /  AlkPhos  123    10 Mar 2021 07:46  TPro  7.5    /  Alb  3.0    /  TBili  0.2    /  DBili  x      /  AST  78     /  ALT  76     /  AlkPhos  146    09 Mar 2021 10:34  TPro  7.2    /  Alb  2.9    /  TBili  0.4    /  DBili  x      /  AST  36     /  ALT  44     /  AlkPhos  138    07 Mar 2021 07:14  TPro  7.3    /  Alb  2.9    /  TBili  0.5    /  DBili  x      /  AST  37     /  ALT  44     /  AlkPhos  145    06 Mar 2021 07:37  TPro  8.1    /  Alb  3.2    /  TBili  0.4    /  DBili  x      /  AST  23     /  ALT  29     /  AlkPhos  139    04 Mar 2021 14:36              CAPILLARY BLOOD GLUCOSE      POCT Blood Glucose.: 145 mg/dL (11 Mar 2021 11:37)  POCT Blood Glucose.: 137 mg/dL (11 Mar 2021 07:59)  POCT Blood Glucose.: 172 mg/dL (10 Mar 2021 21:14)  POCT Blood Glucose.: 194 mg/dL (10 Mar 2021 17:48)      C-Reactive Protein, Serum: 8 mg/L (03-10 @ 13:08)      RADIOLOGY & ADDITIONAL TESTS:    Imaging Personally Reviewed:  [ ] YES  [ ] NO    Consultant(s) Notes Reviewed:  [ ] YES  [ ] NO    Care Discussed with Consultants/Other Providers [ ] YES  [ ] NO
CHIEF COMPLAINT:  GERD symptoms because of empty stomach.   no vomiting now  pain controlled outpatient with tramadol and tried SIMONS -2 inhibitor.   NPO      PHYSICAL EXAM:    GENERAL: Morbidly obese, no acute distress at this time  CHEST/LUNG: Clear to ausculation bilaterally, no wheezing, no crackles   HEART: Regular rate and rhythm; No murmurs, rubs  ABDOMEN: Soft, Nontender, Nondistended; Bowel sounds present  EXTREMITIES:  Moving all four extremities spontaneously, No clubbing, cyanosis, or edema  NERVOUS SYSTEM:  Grossly non focal.  Psychiatry: AAO x 3, mood is appropriate       OBJECTIVE DATA:       Vital Signs Last 24 Hrs  T(C): 36.9 (11 Mar 2021 11:28), Max: 36.9 (11 Mar 2021 11:28)  T(F): 98.4 (11 Mar 2021 11:28), Max: 98.4 (11 Mar 2021 11:28)  HR: 60 (11 Mar 2021 11:28) (60 - 78)  BP: 117/61 (11 Mar 2021 11:28) (112/69 - 144/75)  BP(mean): --  RR: 17 (11 Mar 2021 11:28) (17 - 18)  SpO2: 96% (11 Mar 2021 11:28) (96% - 97%)           Daily     Daily   LABS:                        11.3   9.92  )-----------( 398      ( 10 Mar 2021 07:46 )             34.9             03-10    139  |  104  |  17  ----------------------------<  146<H>  3.9   |  30  |  0.85    Ca    8.7      10 Mar 2021 07:46  Phos  3.7     03-10  Mg     1.7     03-10    TPro  6.7  /  Alb  2.8<L>  /  TBili  0.3  /  DBili  x   /  AST  57<H>  /  ALT  71  /  AlkPhos  123<H>  03-10                       CAPILLARY BLOOD GLUCOSE      POCT Blood Glucose.: 145 mg/dL (11 Mar 2021 11:37)      MEDICATIONS  (STANDING):  enalapril 10 milliGRAM(s) Oral daily  enoxaparin Injectable 40 milliGRAM(s) SubCutaneous daily  furosemide    Tablet 40 milliGRAM(s) Oral daily  insulin glargine Injectable (LANTUS) 30 Unit(s) SubCutaneous at bedtime  insulin lispro Injectable (ADMELOG) 10 Unit(s) SubCutaneous three times a day before meals  pantoprazole  Injectable 40 milliGRAM(s) IV Push daily  pregabalin 150 milliGRAM(s) Oral two times a day  sertraline 50 milliGRAM(s) Oral <User Schedule>    MEDICATIONS  (PRN):  aluminum hydroxide/magnesium hydroxide/simethicone Suspension 30 milliLiter(s) Oral every 6 hours PRN Dyspepsia  cyclobenzaprine 10 milliGRAM(s) Oral two times a day PRN Muscle Spasm  loperamide 2 milliGRAM(s) Oral four times a day PRN loose BM  ondansetron Injectable 4 milliGRAM(s) IV Push every 6 hours PRN Nausea and/or Vomiting  oxycodone    5 mG/acetaminophen 325 mG 2 Tablet(s) Oral every 4 hours PRN Mild Pain (1 - 3)  simethicone 80 milliGRAM(s) Chew three times a day PRN Gas  traMADol 50 milliGRAM(s) Oral every 6 hours PRN Severe Pain (7 - 10)      
CHIEF COMPLAINT:  abd pain now better  + nausea  no vomiting now  + pain hands, shoulder and knees  controlled outpatient with tramadol and tried SIMONS -2 inhibitor.         PHYSICAL EXAM:    GENERAL: Morbidly obese, no acute distress at this time  CHEST/LUNG: Clear to ausculation bilaterally, no wheezing, no crackles   HEART: Regular rate and rhythm; No murmurs, rubs  ABDOMEN: Soft, Nontender, Nondistended; Bowel sounds present  EXTREMITIES:  Moving all four extremities spontaneously, No clubbing, cyanosis, or edema  NERVOUS SYSTEM:  Grossly non focal.  Psychiatry: AAO x 3, mood is appropriate       OBJECTIVE DATA:   Vital Signs Last 24 Hrs  T(C): 36.9 (10 Mar 2021 11:50), Max: 37 (10 Mar 2021 04:55)  T(F): 98.4 (10 Mar 2021 11:50), Max: 98.6 (10 Mar 2021 04:55)  HR: 70 (10 Mar 2021 11:50) (65 - 77)  BP: 108/67 (10 Mar 2021 11:50) (104/67 - 131/74)  BP(mean): --  RR: 17 (10 Mar 2021 11:50) (17 - 18)  SpO2: 98% (10 Mar 2021 11:50) (93% - 99%)           Daily     Daily   LABS:                        11.3   9.92  )-----------( 398      ( 10 Mar 2021 07:46 )             34.9             03-10    139  |  104  |  17  ----------------------------<  146<H>  3.9   |  30  |  0.85    Ca    8.7      10 Mar 2021 07:46  Phos  3.7     03-10  Mg     1.7     03-10    TPro  6.7  /  Alb  2.8<L>  /  TBili  0.3  /  DBili  x   /  AST  57<H>  /  ALT  71  /  AlkPhos  123<H>  03-10                        CAPILLARY BLOOD GLUCOSE      POCT Blood Glucose.: 236 mg/dL (10 Mar 2021 11:17)         Interval Radiology studies: reviewed by me    < from: MR Foot w/wo IV Cont, Right (03.08.21 @ 13:19) >  IMPRESSION:  No osteomyelitis. No abscess.    < end of copied text >    X ray hands, knees and shoulder pending     MEDICATIONS  (STANDING):  enalapril 10 milliGRAM(s) Oral daily  enoxaparin Injectable 40 milliGRAM(s) SubCutaneous daily  furosemide    Tablet 40 milliGRAM(s) Oral daily  insulin glargine Injectable (LANTUS) 30 Unit(s) SubCutaneous at bedtime  insulin lispro Injectable (ADMELOG) 10 Unit(s) SubCutaneous three times a day before meals  pantoprazole  Injectable 40 milliGRAM(s) IV Push daily  pregabalin 150 milliGRAM(s) Oral two times a day  sertraline 50 milliGRAM(s) Oral <User Schedule>    MEDICATIONS  (PRN):  aluminum hydroxide/magnesium hydroxide/simethicone Suspension 30 milliLiter(s) Oral every 6 hours PRN Dyspepsia  cyclobenzaprine 10 milliGRAM(s) Oral two times a day PRN Muscle Spasm  loperamide 2 milliGRAM(s) Oral four times a day PRN loose BM  ondansetron Injectable 4 milliGRAM(s) IV Push every 6 hours PRN Nausea and/or Vomiting  oxycodone    5 mG/acetaminophen 325 mG 2 Tablet(s) Oral every 4 hours PRN Mild Pain (1 - 3)  simethicone 80 milliGRAM(s) Chew three times a day PRN Gas      
HPI:  50 year old female PMH of DM, morbid obesity, HTN, IBS, PSH of cholecystectomy hx presenting to ED due to abdominal pain, severe, nausea/vomiting and diarrhea since yesterday. Noted profuse vomiting and diarrhea which has been very active - to the point of using adult diapers at home. No fevers or abdominal pain.     In past admissions she responds well to IVF and IV ABX for few days.  (04 Mar 2021 18:55)    Patient is a 50y old  Female who presents with a chief complaint of gastroenteritis (06 Mar 2021 14:13)      INTERVAL HPI/OVERNIGHT EVENTS: no acute events     MEDICATIONS  (STANDING):  ciprofloxacin   IVPB 400 milliGRAM(s) IV Intermittent every 12 hours  enalapril 10 milliGRAM(s) Oral daily  enoxaparin Injectable 40 milliGRAM(s) SubCutaneous daily  furosemide    Tablet 40 milliGRAM(s) Oral daily  insulin glargine Injectable (LANTUS) 30 Unit(s) SubCutaneous at bedtime  insulin lispro Injectable (ADMELOG) 10 Unit(s) SubCutaneous three times a day before meals  metroNIDAZOLE  IVPB      metroNIDAZOLE  IVPB 500 milliGRAM(s) IV Intermittent every 8 hours  pantoprazole  Injectable 40 milliGRAM(s) IV Push daily  pregabalin 150 milliGRAM(s) Oral two times a day    MEDICATIONS  (PRN):  aluminum hydroxide/magnesium hydroxide/simethicone Suspension 30 milliLiter(s) Oral every 6 hours PRN Dyspepsia  cyclobenzaprine 10 milliGRAM(s) Oral two times a day PRN Muscle Spasm  loperamide 2 milliGRAM(s) Oral four times a day PRN loose BM  morphine  - Injectable 4 milliGRAM(s) IV Push every 4 hours PRN Severe Pain (7 - 10)  morphine  - Injectable 2 milliGRAM(s) IV Push every 6 hours PRN Moderate Pain (4 - 6)  ondansetron Injectable 4 milliGRAM(s) IV Push every 6 hours PRN Nausea and/or Vomiting  oxycodone    5 mG/acetaminophen 325 mG 2 Tablet(s) Oral every 4 hours PRN Mild Pain (1 - 3)  simethicone 80 milliGRAM(s) Chew three times a day PRN Gas      Allergies    amitriptyline (Other)    Intolerances        REVIEW OF SYSTEMS:  CONSTITUTIONAL: No fever, weight loss, or fatigue  EYES: No eye pain, visual disturbances, or discharge  ENMT:  No difficulty hearing, tinnitus, vertigo; No sinus or throat pain  NECK: No pain or stiffness  BREASTS: No pain, masses, or nipple discharge  RESPIRATORY: No cough, wheezing, chills or hemoptysis; No shortness of breath  CARDIOVASCULAR: No chest pain, palpitations, dizziness, or leg swelling  GASTROINTESTINAL: abdominal pain loose stool   GENITOURINARY: No dysuria, frequency, hematuria, or incontinence  NEUROLOGICAL: No headaches, memory loss, loss of strength, numbness, or tremors  SKIN: No itching, burning, rashes, or lesions   LYMPH NODES: No enlarged glands  ENDOCRINE: No heat or cold intolerance; No hair loss  MUSCULOSKELETAL: right foot pain   PSYCHIATRIC: No depression, anxiety, mood swings, or difficulty sleeping  HEME/LYMPH: No easy bruising, or bleeding gums  ALLERGY AND IMMUNOLOGIC: No hives or eczema    Vital Signs Last 24 Hrs  T(C): 36.5 (07 Mar 2021 17:42), Max: 37 (06 Mar 2021 23:50)  T(F): 97.7 (07 Mar 2021 17:42), Max: 98.6 (06 Mar 2021 23:50)  HR: 65 (07 Mar 2021 17:42) (60 - 74)  BP: 93/51 (07 Mar 2021 17:42) (92/61 - 138/78)  RR: 18 (07 Mar 2021 17:42) (17 - 18)  SpO2: 98% (07 Mar 2021 17:42) (96% - 98%)    PHYSICAL EXAM:  GENERAL: NAD, well-groomed, well-developed  HEAD:  Atraumatic, Normocephalic  EYES: EOMI, PERRLA, conjunctiva and sclera clear  ENMT: No tonsillar erythema, exudates, or enlargement; Moist mucous membranes, Good dentition, No lesions  NECK: Supple, No JVD, Normal thyroid  NERVOUS SYSTEM:  Alert & Oriented X3, Good concentration; Motor Strength 5/5 B/L upper and lower extremities; DTRs 2+ intact and symmetric  CHEST/LUNG: Clear to percussion bilaterally; No rales, rhonchi, wheezing, or rubs  HEART: Regular rate and rhythm; No murmurs, rubs, or gallops  ABDOMEN: Soft, Nontender, Nondistended; Bowel sounds present  EXTREMITIES:  edema tender ness right foot   LYMPH: No lymphadenopathy noted  SKIN: No rashes or lesions    LABS:                        11.6   7.25  )-----------( 407      ( 07 Mar 2021 07:14 )             36.4     03-07    137  |  102  |  10  ----------------------------<  128<H>  3.7   |  28  |  0.82    Ca    8.4<L>      07 Mar 2021 07:14  Phos  3.6     03-07  Mg     1.8     03-07    TPro  7.2  /  Alb  2.9<L>  /  TBili  0.4  /  DBili  x   /  AST  36  /  ALT  44  /  AlkPhos  138<H>  03-07        CAPILLARY BLOOD GLUCOSE      POCT Blood Glucose.: 109 mg/dL (07 Mar 2021 16:45)  POCT Blood Glucose.: 125 mg/dL (07 Mar 2021 11:24)  POCT Blood Glucose.: 139 mg/dL (07 Mar 2021 07:34)  POCT Blood Glucose.: 153 mg/dL (06 Mar 2021 21:28)      RADIOLOGY & ADDITIONAL TESTS:    Imaging Personally Reviewed:  [ ] YES  [ ] NO    Consultant(s) Notes Reviewed:  [ ] YES  [ ] NO    Care Discussed with Consultants/Other Providers [ ] YES  [ ] NO
Patient is a 50y old  Female who presents with a chief complaint of abdominal pain (09 Mar 2021 19:11)      HPI:  50 year old female PMH of DM, morbid obesity, HTN, IBS, PSH of cholecystectomy hx presenting to ED due to abdominal pain, severe, nausea/vomiting and diarrhea since yesterday. Noted profuse vomiting and diarrhea which has been very active - to the point of using adult diapers at home. No fevers or abdominal pain.     In past admissions she responds well to IVF and IV ABX for few days.  (04 Mar 2021 18:55)      INTERVAL HPI/OVERNIGHT EVENTS:  The patient denies melena, hematochezia, hematemesis, nausea, vomiting, abdominal pain, constipation, diarrhea, or change in bowel movements Tolerating diet.     MEDICATIONS  (STANDING):  enalapril 10 milliGRAM(s) Oral daily  enoxaparin Injectable 40 milliGRAM(s) SubCutaneous daily  furosemide    Tablet 40 milliGRAM(s) Oral daily  insulin glargine Injectable (LANTUS) 30 Unit(s) SubCutaneous at bedtime  insulin lispro Injectable (ADMELOG) 10 Unit(s) SubCutaneous three times a day before meals  pantoprazole  Injectable 40 milliGRAM(s) IV Push daily  sertraline 50 milliGRAM(s) Oral <User Schedule>    MEDICATIONS  (PRN):  aluminum hydroxide/magnesium hydroxide/simethicone Suspension 30 milliLiter(s) Oral every 6 hours PRN Dyspepsia  cyclobenzaprine 10 milliGRAM(s) Oral two times a day PRN Muscle Spasm  loperamide 2 milliGRAM(s) Oral four times a day PRN loose BM  ondansetron Injectable 4 milliGRAM(s) IV Push every 6 hours PRN Nausea and/or Vomiting  oxycodone    5 mG/acetaminophen 325 mG 2 Tablet(s) Oral every 4 hours PRN Mild Pain (1 - 3)  simethicone 80 milliGRAM(s) Chew three times a day PRN Gas      FAMILY HISTORY:  No pertinent family history in first degree relatives    No pertinent family history in first degree relatives        Allergies    amitriptyline (Other)    Intolerances        PMH/PSH:  IBS (irritable bowel syndrome)    Essential hypertension    Diabetes    Cardiac abnormality    Neuralgia    Marijuana smoker, continuous    Former cigarette smoker    Obesity    Neuropathy    HTN (hypertension)    Diabetes    No significant past surgical history    History of hand surgery    History of cholecystectomy          REVIEW OF SYSTEMS:  CONSTITUTIONAL: No fever, weight loss, or fatigue  EYES: No eye pain, visual disturbances, or discharge  ENMT:  No difficulty hearing, tinnitus, vertigo; No sinus or throat pain  NECK: No pain or stiffness  BREASTS: No pain, masses, or nipple discharge  RESPIRATORY: No cough, wheezing, chills or hemoptysis; No shortness of breath  CARDIOVASCULAR: No chest pain, palpitations, dizziness, or leg swelling  GASTROINTESTINAL: See above  GENITOURINARY: No dysuria, frequency, hematuria, or incontinence  NEUROLOGICAL: No headaches, memory loss, loss of strength, numbness, or tremors  SKIN: No itching, burning, rashes, or lesions   LYMPH NODES: No enlarged glands  ENDOCRINE: No heat or cold intolerance; No hair loss  MUSCULOSKELETAL: No joint pain or swelling; No muscle, back, or extremity pain  PSYCHIATRIC: No depression, anxiety, mood swings, or difficulty sleeping  HEME/LYMPH: No easy bruising, or bleeding gums  ALLERGY AND IMMUNOLOGIC: No hives or eczema    Vital Signs Last 24 Hrs  T(C): 36.5 (12 Mar 2021 14:57), Max: 37.4 (11 Mar 2021 17:04)  T(F): 97.7 (12 Mar 2021 14:57), Max: 99.3 (11 Mar 2021 17:04)  HR: 72 (12 Mar 2021 14:57) (63 - 78)  BP: 137/69 (12 Mar 2021 14:57) (98/65 - 137/69)  BP(mean): --  RR: 19 (12 Mar 2021 14:57) (16 - 19)  SpO2: 98% (12 Mar 2021 14:57) (94% - 100%)    PHYSICAL EXAM:  GENERAL: NAD, well-groomed, well-developed  HEAD:  Atraumatic, Normocephalic  EYES: EOMI, PERRLA, conjunctiva and sclera clear  NECK: Supple, No JVD, Normal thyroid  NERVOUS SYSTEM:  Alert & Oriented X3, Good concentration; Motor Strength 5/5 B/L upper and lower extremities;   CHEST/LUNG: Clear to percussion bilaterally; No rales, rhonchi, wheezing, or rubs  HEART: Regular rate and rhythm; No murmurs, rubs, or gallops  ABDOMEN: Soft, Nontender, Nondistended; Bowel sounds present  EXTREMITIES:  2+ Peripheral Pulses, No clubbing, cyanosis, or edema  LYMPH: No lymphadenopathy noted  SKIN: No rashes or lesions    LAB        CBC:  03-10 @ 07:46  WBC 9.92   Hgb 11.3   Hct 34.9   Plts 398  MCV 92.3  03-09 @ 10:34  WBC 9.33   Hgb 12.0   Hct 36.6   Plts 415  MCV 92.2  03-08 @ 06:56  WBC 8.71   Hgb 10.8   Hct 33.8   Plts 387  MCV 92.6  03-07 @ 07:14  WBC 7.25   Hgb 11.6   Hct 36.4   Plts 407  MCV 92.2  03-06 @ 07:37  WBC 8.39   Hgb 11.4   Hct 34.6   Plts 425  MCV 90.3      Chemistry:  03-10 @ 07:46  Na+ 139  K+ 3.9  Cl- 104  CO2 30  BUN 17  Cr 0.85     03-09 @ 10:34  Na+ 138  K+ 3.8  Cl- 103  CO2 29  BUN 15  Cr 0.89     03-08 @ 06:56  Na+ 137  K+ 3.8  Cl- 102  CO2 29  BUN 14  Cr 0.86     03-07 @ 07:14  Na+ 137  K+ 3.7  Cl- 102  CO2 28  BUN 10  Cr 0.82     03-06 @ 07:37  Na+ 141  K+ 3.6  Cl- 107  CO2 28  BUN 11  Cr 0.77         Glucose, Serum: 146 mg/dL (03-10 @ 07:46)  Glucose, Serum: 157 mg/dL (03-09 @ 10:34)  Glucose, Serum: 147 mg/dL (03-08 @ 06:56)  Glucose, Serum: 128 mg/dL (03-07 @ 07:14)  Glucose, Serum: 103 mg/dL (03-06 @ 07:37)      10 Mar 2021 07:46    139    |  104    |  17     ----------------------------<  146    3.9     |  30     |  0.85   09 Mar 2021 10:34    138    |  103    |  15     ----------------------------<  157    3.8     |  29     |  0.89   08 Mar 2021 06:56    137    |  102    |  14     ----------------------------<  147    3.8     |  29     |  0.86   07 Mar 2021 07:14    137    |  102    |  10     ----------------------------<  128    3.7     |  28     |  0.82   06 Mar 2021 07:37    141    |  107    |  11     ----------------------------<  103    3.6     |  28     |  0.77     Ca    8.7        10 Mar 2021 07:46  Ca    8.6        09 Mar 2021 10:34  Ca    8.3        08 Mar 2021 06:56  Ca    8.4        07 Mar 2021 07:14  Ca    8.6        06 Mar 2021 07:37  Phos  3.7       10 Mar 2021 07:46  Phos  3.9       09 Mar 2021 10:34  Phos  3.7       08 Mar 2021 06:56  Phos  3.6       07 Mar 2021 07:14  Phos  3.7       06 Mar 2021 07:37  Mg     1.7       10 Mar 2021 07:46  Mg     1.6       09 Mar 2021 10:34  Mg     1.8       08 Mar 2021 06:56  Mg     1.8       07 Mar 2021 07:14  Mg     1.6       06 Mar 2021 07:37    TPro  6.7    /  Alb  2.8    /  TBili  0.3    /  DBili  x      /  AST  57     /  ALT  71     /  AlkPhos  123    10 Mar 2021 07:46  TPro  7.5    /  Alb  3.0    /  TBili  0.2    /  DBili  x      /  AST  78     /  ALT  76     /  AlkPhos  146    09 Mar 2021 10:34  TPro  7.2    /  Alb  2.9    /  TBili  0.4    /  DBili  x      /  AST  36     /  ALT  44     /  AlkPhos  138    07 Mar 2021 07:14  TPro  7.3    /  Alb  2.9    /  TBili  0.5    /  DBili  x      /  AST  37     /  ALT  44     /  AlkPhos  145    06 Mar 2021 07:37              CAPILLARY BLOOD GLUCOSE      POCT Blood Glucose.: 176 mg/dL (12 Mar 2021 11:08)  POCT Blood Glucose.: 155 mg/dL (12 Mar 2021 07:39)  POCT Blood Glucose.: 143 mg/dL (11 Mar 2021 22:17)  POCT Blood Glucose.: 102 mg/dL (11 Mar 2021 16:28)      C-Reactive Protein, Serum: 8 mg/L (03-10 @ 13:08)      RADIOLOGY & ADDITIONAL TESTS:    Imaging Personally Reviewed:  [ ] YES  [ ] NO    Consultant(s) Notes Reviewed:  [ ] YES  [ ] NO    Care Discussed with Consultants/Other Providers [ ] YES  [ ] NO
Patient is a 50y old  Female who presents with a chief complaint of abdominal pain (08 Mar 2021 19:20)      HPI:  50 year old female PMH of DM, morbid obesity, HTN, IBS, PSH of cholecystectomy hx presenting to ED due to abdominal pain, severe, nausea/vomiting and diarrhea since yesterday. Noted profuse vomiting and diarrhea which has been very active - to the point of using adult diapers at home. No fevers or abdominal pain.     In past admissions she responds well to IVF and IV ABX for few days.  (04 Mar 2021 18:55)      INTERVAL HPI/OVERNIGHT EVENTS:  Diarrhea and abdominal pain better today. Patient started on a gluten free diet. Eating well.    MEDICATIONS  (STANDING):  ciprofloxacin   IVPB 400 milliGRAM(s) IV Intermittent every 12 hours  enalapril 10 milliGRAM(s) Oral daily  enoxaparin Injectable 40 milliGRAM(s) SubCutaneous daily  furosemide    Tablet 40 milliGRAM(s) Oral daily  insulin glargine Injectable (LANTUS) 30 Unit(s) SubCutaneous at bedtime  insulin lispro Injectable (ADMELOG) 10 Unit(s) SubCutaneous three times a day before meals  metroNIDAZOLE  IVPB      metroNIDAZOLE  IVPB 500 milliGRAM(s) IV Intermittent every 8 hours  pantoprazole  Injectable 40 milliGRAM(s) IV Push daily  pregabalin 150 milliGRAM(s) Oral two times a day    MEDICATIONS  (PRN):  aluminum hydroxide/magnesium hydroxide/simethicone Suspension 30 milliLiter(s) Oral every 6 hours PRN Dyspepsia  cyclobenzaprine 10 milliGRAM(s) Oral two times a day PRN Muscle Spasm  loperamide 2 milliGRAM(s) Oral four times a day PRN loose BM  morphine  - Injectable 4 milliGRAM(s) IV Push every 4 hours PRN Severe Pain (7 - 10)  morphine  - Injectable 2 milliGRAM(s) IV Push every 6 hours PRN Moderate Pain (4 - 6)  ondansetron Injectable 4 milliGRAM(s) IV Push every 6 hours PRN Nausea and/or Vomiting  oxycodone    5 mG/acetaminophen 325 mG 2 Tablet(s) Oral every 4 hours PRN Mild Pain (1 - 3)  simethicone 80 milliGRAM(s) Chew three times a day PRN Gas      FAMILY HISTORY:  No pertinent family history in first degree relatives    No pertinent family history in first degree relatives        Allergies    amitriptyline (Other)    Intolerances        PMH/PSH:  IBS (irritable bowel syndrome)    Essential hypertension    Diabetes    Cardiac abnormality    Neuralgia    Marijuana smoker, continuous    Former cigarette smoker    Obesity    Neuropathy    HTN (hypertension)    Diabetes    No significant past surgical history    History of hand surgery    History of cholecystectomy          REVIEW OF SYSTEMS:  CONSTITUTIONAL: No fever, weight loss,   EYES: No eye pain, visual disturbances, or discharge  ENMT:  No difficulty hearing, tinnitus, vertigo; No sinus or throat pain  NECK: No pain or stiffness  BREASTS: No pain, masses, or nipple discharge  RESPIRATORY: No cough, wheezing, chills or hemoptysis; No shortness of breath  CARDIOVASCULAR: No chest pain, palpitations, dizziness, or leg swelling  GASTROINTESTINAL: See above  GENITOURINARY: No dysuria, frequency, hematuria, or incontinence  NEUROLOGICAL: No headaches, memory loss, loss of strength, numbness, or tremors  SKIN: No itching, burning, rashes, or lesions   LYMPH NODES: No enlarged glands  ENDOCRINE: No heat or cold intolerance; No hair loss  MUSCULOSKELETAL: No joint pain or swelling; No muscle, back, or extremity pain  PSYCHIATRIC: No depression, anxiety, mood swings, or difficulty sleeping  HEME/LYMPH: No easy bruising, or bleeding gums  ALLERGY AND IMMUNOLOGIC: No hives or eczema    Vital Signs Last 24 Hrs  T(C): 36.9 (09 Mar 2021 11:09), Max: 36.9 (09 Mar 2021 11:09)  T(F): 98.5 (09 Mar 2021 11:09), Max: 98.5 (09 Mar 2021 11:09)  HR: 72 (09 Mar 2021 11:09) (62 - 72)  BP: 108/75 (09 Mar 2021 11:09) (108/75 - 131/76)  BP(mean): --  RR: 17 (09 Mar 2021 11:09) (17 - 18)  SpO2: 97% (09 Mar 2021 11:09) (96% - 97%)    PHYSICAL EXAM:  GENERAL: NAD, well-groomed, well-developed  HEAD:  Atraumatic, Normocephalic  EYES: EOMI, PERRLA, conjunctiva and sclera clear  NECK: Supple, No JVD, Normal thyroid  NERVOUS SYSTEM:  Alert & Oriented X3, Good concentration; Motor Strength 5/5 B/L upper and lower extremities;  CHEST/LUNG: Clear to percussion bilaterally; No rales, rhonchi, wheezing, or rubs  HEART: Regular rate and rhythm; No murmurs, rubs, or gallops  ABDOMEN: Soft, Nontender, Nondistended; Bowel sounds present  EXTREMITIES:  2+ Peripheral Pulses, No clubbing, cyanosis, or edema  LYMPH: No lymphadenopathy noted  SKIN: No rashes or lesions    LAB                          12.0   9.33  )-----------( 415      ( 09 Mar 2021 10:34 )             36.6       CBC:  03-09 @ 10:34  WBC 9.33   Hgb 12.0   Hct 36.6   Plts 415  MCV 92.2  03-08 @ 06:56  WBC 8.71   Hgb 10.8   Hct 33.8   Plts 387  MCV 92.6  03-07 @ 07:14  WBC 7.25   Hgb 11.6   Hct 36.4   Plts 407  MCV 92.2  03-06 @ 07:37  WBC 8.39   Hgb 11.4   Hct 34.6   Plts 425  MCV 90.3  03-05 @ 06:16  WBC 10.48   Hgb 11.4   Hct 35.6   Plts 431  MCV 91.5  03-04 @ 14:36  WBC 16.52   Hgb 12.1   Hct 36.9   Plts 504  MCV 90.2      Chemistry:  03-09 @ 10:34  Na+ 138  K+ 3.8  Cl- 103  CO2 29  BUN 15  Cr 0.89     03-08 @ 06:56  Na+ 137  K+ 3.8  Cl- 102  CO2 29  BUN 14  Cr 0.86     03-07 @ 07:14  Na+ 137  K+ 3.7  Cl- 102  CO2 28  BUN 10  Cr 0.82     03-06 @ 07:37  Na+ 141  K+ 3.6  Cl- 107  CO2 28  BUN 11  Cr 0.77     03-05 @ 06:16  Na+ 139  K+ 3.6  Cl- 105  CO2 28  BUN 13  Cr 0.73     03-04 @ 14:36  Na+ 140  K+ 4.4  Cl- 106  CO2 26  BUN 16  Cr 0.67         Glucose, Serum: 157 mg/dL (03-09 @ 10:34)  Glucose, Serum: 147 mg/dL (03-08 @ 06:56)  Glucose, Serum: 128 mg/dL (03-07 @ 07:14)  Glucose, Serum: 103 mg/dL (03-06 @ 07:37)  Glucose, Serum: 76 mg/dL (03-05 @ 06:16)  Glucose, Serum: 97 mg/dL (03-04 @ 14:36)      09 Mar 2021 10:34    138    |  103    |  15     ----------------------------<  157    3.8     |  29     |  0.89   08 Mar 2021 06:56    137    |  102    |  14     ----------------------------<  147    3.8     |  29     |  0.86   07 Mar 2021 07:14    137    |  102    |  10     ----------------------------<  128    3.7     |  28     |  0.82   06 Mar 2021 07:37    141    |  107    |  11     ----------------------------<  103    3.6     |  28     |  0.77   05 Mar 2021 06:16    139    |  105    |  13     ----------------------------<  76     3.6     |  28     |  0.73   04 Mar 2021 14:36    140    |  106    |  16     ----------------------------<  97     4.4     |  26     |  0.67     Ca    8.6        09 Mar 2021 10:34  Ca    8.3        08 Mar 2021 06:56  Ca    8.4        07 Mar 2021 07:14  Ca    8.6        06 Mar 2021 07:37  Ca    8.3        05 Mar 2021 06:16  Ca    8.8        04 Mar 2021 14:36  Phos  3.9       09 Mar 2021 10:34  Phos  3.7       08 Mar 2021 06:56  Phos  3.6       07 Mar 2021 07:14  Phos  3.7       06 Mar 2021 07:37  Mg     1.6       09 Mar 2021 10:34  Mg     1.8       08 Mar 2021 06:56  Mg     1.8       07 Mar 2021 07:14  Mg     1.6       06 Mar 2021 07:37    TPro  7.5    /  Alb  3.0    /  TBili  0.2    /  DBili  x      /  AST  78     /  ALT  76     /  AlkPhos  146    09 Mar 2021 10:34  TPro  7.2    /  Alb  2.9    /  TBili  0.4    /  DBili  x      /  AST  36     /  ALT  44     /  AlkPhos  138    07 Mar 2021 07:14  TPro  7.3    /  Alb  2.9    /  TBili  0.5    /  DBili  x      /  AST  37     /  ALT  44     /  AlkPhos  145    06 Mar 2021 07:37  TPro  8.1    /  Alb  3.2    /  TBili  0.4    /  DBili  x      /  AST  23     /  ALT  29     /  AlkPhos  139    04 Mar 2021 14:36              CAPILLARY BLOOD GLUCOSE      POCT Blood Glucose.: 187 mg/dL (09 Mar 2021 11:34)  POCT Blood Glucose.: 139 mg/dL (09 Mar 2021 07:46)  POCT Blood Glucose.: 112 mg/dL (08 Mar 2021 21:20)  POCT Blood Glucose.: 118 mg/dL (08 Mar 2021 16:10)          RADIOLOGY & ADDITIONAL TESTS:    Imaging Personally Reviewed:  [ ] YES  [ ] NO    Consultant(s) Notes Reviewed:  [ ] YES  [ ] NO    Care Discussed with Consultants/Other Providers [ ] YES  [ ] NO
Patient is a 50y old  Female who presents with a chief complaint of abdominal pain (09 Mar 2021 19:11)      HPI:  50 year old female PMH of DM, morbid obesity, HTN, IBS, PSH of cholecystectomy hx presenting to ED due to abdominal pain, severe, nausea/vomiting and diarrhea since yesterday. Noted profuse vomiting and diarrhea which has been very active - to the point of using adult diapers at home. No fevers or abdominal pain.     In past admissions she responds well to IVF and IV ABX for few days.  (04 Mar 2021 18:55)      INTERVAL HPI/OVERNIGHT EVENTS: Patient seen earlier today  Abdominal pain better. Diarrhea resolved. Tolerating gluten free diet    MEDICATIONS  (STANDING):  enalapril 10 milliGRAM(s) Oral daily  enoxaparin Injectable 40 milliGRAM(s) SubCutaneous daily  furosemide    Tablet 40 milliGRAM(s) Oral daily  insulin glargine Injectable (LANTUS) 30 Unit(s) SubCutaneous at bedtime  insulin lispro Injectable (ADMELOG) 10 Unit(s) SubCutaneous three times a day before meals  pantoprazole  Injectable 40 milliGRAM(s) IV Push daily  pregabalin 150 milliGRAM(s) Oral two times a day  sertraline 50 milliGRAM(s) Oral <User Schedule>    MEDICATIONS  (PRN):  aluminum hydroxide/magnesium hydroxide/simethicone Suspension 30 milliLiter(s) Oral every 6 hours PRN Dyspepsia  cyclobenzaprine 10 milliGRAM(s) Oral two times a day PRN Muscle Spasm  loperamide 2 milliGRAM(s) Oral four times a day PRN loose BM  ondansetron Injectable 4 milliGRAM(s) IV Push every 6 hours PRN Nausea and/or Vomiting  oxycodone    5 mG/acetaminophen 325 mG 2 Tablet(s) Oral every 4 hours PRN Mild Pain (1 - 3)  simethicone 80 milliGRAM(s) Chew three times a day PRN Gas      FAMILY HISTORY:  No pertinent family history in first degree relatives    No pertinent family history in first degree relatives        Allergies    amitriptyline (Other)    Intolerances        PMH/PSH:  IBS (irritable bowel syndrome)    Essential hypertension    Diabetes    Cardiac abnormality    Neuralgia    Marijuana smoker, continuous    Former cigarette smoker    Obesity    Neuropathy    HTN (hypertension)    Diabetes    No significant past surgical history    History of hand surgery    History of cholecystectomy          REVIEW OF SYSTEMS:  CONSTITUTIONAL: No fever, weight loss,   EYES: No eye pain, visual disturbances, or discharge  ENMT:  No difficulty hearing, tinnitus, vertigo; No sinus or throat pain  NECK: No pain or stiffness  BREASTS: No pain, masses, or nipple discharge  RESPIRATORY: No cough, wheezing, chills or hemoptysis; No shortness of breath  CARDIOVASCULAR: No chest pain, palpitations, dizziness, or leg swelling  GASTROINTESTINAL: See above  GENITOURINARY: No dysuria, frequency, hematuria, or incontinence  NEUROLOGICAL: No headaches,  numbness, or tremors  SKIN: No itching, burning, rashes, or lesions   LYMPH NODES: No enlarged glands  ENDOCRINE: No heat or cold intolerance; No hair loss  MUSCULOSKELETAL: No joint pain or swelling; No muscle, back, or extremity pain  PSYCHIATRIC: No depression, anxiety, mood swings, or difficulty sleeping  HEME/LYMPH: No easy bruising, or bleeding gums  ALLERGY AND IMMUNOLOGIC: No hives or eczema    Vital Signs Last 24 Hrs  T(C): 36.9 (10 Mar 2021 11:50), Max: 37 (10 Mar 2021 04:55)  T(F): 98.4 (10 Mar 2021 11:50), Max: 98.6 (10 Mar 2021 04:55)  HR: 70 (10 Mar 2021 11:50) (65 - 77)  BP: 108/67 (10 Mar 2021 11:50) (104/67 - 131/74)  BP(mean): --  RR: 17 (10 Mar 2021 11:50) (17 - 18)  SpO2: 98% (10 Mar 2021 11:50) (93% - 99%)    PHYSICAL EXAM:  GENERAL: NAD, well-groomed, well-developed  HEAD:  Atraumatic, Normocephalic  EYES: EOMI, PERRLA, conjunctiva and sclera clear  NECK: Supple, No JVD, Normal thyroid  NERVOUS SYSTEM:  Alert & Oriented X3, Good concentration; Motor Strength 5/5 B/L upper and lower extremities;   CHEST/LUNG: Clear to percussion bilaterally; No rales, rhonchi, wheezing, or rubs  HEART: Regular rate and rhythm; No murmurs, rubs, or gallops  ABDOMEN: Soft, Nontender, Nondistended; Bowel sounds present  EXTREMITIES:  2+ Peripheral Pulses, No clubbing, cyanosis, or edema  LYMPH: No lymphadenopathy noted  SKIN: No rashes or lesions    LAB                          11.3   9.92  )-----------( 398      ( 10 Mar 2021 07:46 )             34.9       CBC:  03-10 @ 07:46  WBC 9.92   Hgb 11.3   Hct 34.9   Plts 398  MCV 92.3  03-09 @ 10:34  WBC 9.33   Hgb 12.0   Hct 36.6   Plts 415  MCV 92.2  03-08 @ 06:56  WBC 8.71   Hgb 10.8   Hct 33.8   Plts 387  MCV 92.6  03-07 @ 07:14  WBC 7.25   Hgb 11.6   Hct 36.4   Plts 407  MCV 92.2  03-06 @ 07:37  WBC 8.39   Hgb 11.4   Hct 34.6   Plts 425  MCV 90.3  03-05 @ 06:16  WBC 10.48   Hgb 11.4   Hct 35.6   Plts 431  MCV 91.5  03-04 @ 14:36  WBC 16.52   Hgb 12.1   Hct 36.9   Plts 504  MCV 90.2      Chemistry:  03-10 @ 07:46  Na+ 139  K+ 3.9  Cl- 104  CO2 30  BUN 17  Cr 0.85     03-09 @ 10:34  Na+ 138  K+ 3.8  Cl- 103  CO2 29  BUN 15  Cr 0.89     03-08 @ 06:56  Na+ 137  K+ 3.8  Cl- 102  CO2 29  BUN 14  Cr 0.86     03-07 @ 07:14  Na+ 137  K+ 3.7  Cl- 102  CO2 28  BUN 10  Cr 0.82     03-06 @ 07:37  Na+ 141  K+ 3.6  Cl- 107  CO2 28  BUN 11  Cr 0.77     03-05 @ 06:16  Na+ 139  K+ 3.6  Cl- 105  CO2 28  BUN 13  Cr 0.73     03-04 @ 14:36  Na+ 140  K+ 4.4  Cl- 106  CO2 26  BUN 16  Cr 0.67         Glucose, Serum: 146 mg/dL (03-10 @ 07:46)  Glucose, Serum: 157 mg/dL (03-09 @ 10:34)  Glucose, Serum: 147 mg/dL (03-08 @ 06:56)  Glucose, Serum: 128 mg/dL (03-07 @ 07:14)  Glucose, Serum: 103 mg/dL (03-06 @ 07:37)  Glucose, Serum: 76 mg/dL (03-05 @ 06:16)  Glucose, Serum: 97 mg/dL (03-04 @ 14:36)      10 Mar 2021 07:46    139    |  104    |  17     ----------------------------<  146    3.9     |  30     |  0.85   09 Mar 2021 10:34    138    |  103    |  15     ----------------------------<  157    3.8     |  29     |  0.89   08 Mar 2021 06:56    137    |  102    |  14     ----------------------------<  147    3.8     |  29     |  0.86   07 Mar 2021 07:14    137    |  102    |  10     ----------------------------<  128    3.7     |  28     |  0.82   06 Mar 2021 07:37    141    |  107    |  11     ----------------------------<  103    3.6     |  28     |  0.77   05 Mar 2021 06:16    139    |  105    |  13     ----------------------------<  76     3.6     |  28     |  0.73   04 Mar 2021 14:36    140    |  106    |  16     ----------------------------<  97     4.4     |  26     |  0.67     Ca    8.7        10 Mar 2021 07:46  Ca    8.6        09 Mar 2021 10:34  Ca    8.3        08 Mar 2021 06:56  Ca    8.4        07 Mar 2021 07:14  Ca    8.6        06 Mar 2021 07:37  Ca    8.3        05 Mar 2021 06:16  Ca    8.8        04 Mar 2021 14:36  Phos  3.7       10 Mar 2021 07:46  Phos  3.9       09 Mar 2021 10:34  Phos  3.7       08 Mar 2021 06:56  Phos  3.6       07 Mar 2021 07:14  Phos  3.7       06 Mar 2021 07:37  Mg     1.7       10 Mar 2021 07:46  Mg     1.6       09 Mar 2021 10:34  Mg     1.8       08 Mar 2021 06:56  Mg     1.8       07 Mar 2021 07:14  Mg     1.6       06 Mar 2021 07:37    TPro  6.7    /  Alb  2.8    /  TBili  0.3    /  DBili  x      /  AST  57     /  ALT  71     /  AlkPhos  123    10 Mar 2021 07:46  TPro  7.5    /  Alb  3.0    /  TBili  0.2    /  DBili  x      /  AST  78     /  ALT  76     /  AlkPhos  146    09 Mar 2021 10:34  TPro  7.2    /  Alb  2.9    /  TBili  0.4    /  DBili  x      /  AST  36     /  ALT  44     /  AlkPhos  138    07 Mar 2021 07:14  TPro  7.3    /  Alb  2.9    /  TBili  0.5    /  DBili  x      /  AST  37     /  ALT  44     /  AlkPhos  145    06 Mar 2021 07:37  TPro  8.1    /  Alb  3.2    /  TBili  0.4    /  DBili  x      /  AST  23     /  ALT  29     /  AlkPhos  139    04 Mar 2021 14:36              CAPILLARY BLOOD GLUCOSE      POCT Blood Glucose.: 236 mg/dL (10 Mar 2021 11:17)  POCT Blood Glucose.: 208 mg/dL (10 Mar 2021 11:00)  POCT Blood Glucose.: 117 mg/dL (10 Mar 2021 07:41)  POCT Blood Glucose.: 132 mg/dL (09 Mar 2021 21:56)      C-Reactive Protein, Serum: 8 mg/L (03-10 @ 13:08)      RADIOLOGY & ADDITIONAL TESTS:    Imaging Personally Reviewed:  [ ] YES  [ ] NO    Consultant(s) Notes Reviewed:  [ ] YES  [ ] NO    Care Discussed with Consultants/Other Providers [ ] YES  [ ] NO
HPI:  50 year old female PMH of DM, morbid obesity, HTN, IBS, PSH of cholecystectomy hx presenting to ED due to abdominal pain, severe, nausea/vomiting and diarrhea since yesterday. Noted profuse vomiting and diarrhea which has been very active - to the point of using adult diapers at home. No fevers or abdominal pain.     In past admissions she responds well to IVF and IV ABX for few days.  (04 Mar 2021 18:55)    Patient is a 50y old  Female who presents with a chief complaint of enteritis (05 Mar 2021 17:44)      INTERVAL HPI/OVERNIGHT EVENTS: no acute events wants to try some fodd     MEDICATIONS  (STANDING):  ciprofloxacin   IVPB 400 milliGRAM(s) IV Intermittent every 12 hours  enalapril 10 milliGRAM(s) Oral daily  enoxaparin Injectable 40 milliGRAM(s) SubCutaneous daily  furosemide   Injectable 40 milliGRAM(s) IV Push once  insulin glargine Injectable (LANTUS) 30 Unit(s) SubCutaneous at bedtime  insulin lispro Injectable (ADMELOG) 10 Unit(s) SubCutaneous three times a day before meals  metroNIDAZOLE  IVPB      metroNIDAZOLE  IVPB 500 milliGRAM(s) IV Intermittent every 8 hours  pantoprazole  Injectable 40 milliGRAM(s) IV Push daily  pregabalin 150 milliGRAM(s) Oral two times a day    MEDICATIONS  (PRN):  aluminum hydroxide/magnesium hydroxide/simethicone Suspension 30 milliLiter(s) Oral every 6 hours PRN Dyspepsia  cyclobenzaprine 10 milliGRAM(s) Oral two times a day PRN Muscle Spasm  loperamide 2 milliGRAM(s) Oral four times a day PRN loose BM  morphine  - Injectable 4 milliGRAM(s) IV Push every 4 hours PRN Severe Pain (7 - 10)  morphine  - Injectable 2 milliGRAM(s) IV Push every 6 hours PRN Moderate Pain (4 - 6)  ondansetron Injectable 4 milliGRAM(s) IV Push every 6 hours PRN Nausea and/or Vomiting  oxycodone    5 mG/acetaminophen 325 mG 2 Tablet(s) Oral every 4 hours PRN Mild Pain (1 - 3)  simethicone 80 milliGRAM(s) Chew three times a day PRN Gas      Allergies    amitriptyline (Other)    Intolerances        REVIEW OF SYSTEMS:  CONSTITUTIONAL: fatigue  EYES: No eye pain, visual disturbances, or discharge  ENMT:  No difficulty hearing, tinnitus, vertigo; No sinus or throat pain  NECK: No pain or stiffness  BREASTS: No pain, masses, or nipple discharge  RESPIRATORY: No cough, wheezing, chills or hemoptysis; No shortness of breath  CARDIOVASCULAR:  leg swelling  GASTROINTESTINAL:  abdominal  and epigastric pain.  diarrhea   GENITOURINARY: No dysuria, frequency, hematuria, or incontinence  NEUROLOGICAL: No headaches, memory loss, loss of strength, numbness, or tremors  SKIN: No itching, burning, rashes, or lesions   LYMPH NODES: No enlarged glands  ENDOCRINE: No heat or cold intolerance; No hair loss  MUSCULOSKELETAL: No joint pain or swelling; No muscle, back, or extremity pain  PSYCHIATRIC: No depression, anxiety, mood swings, or difficulty sleeping  HEME/LYMPH: No easy bruising, or bleeding gums  ALLERGY AND IMMUNOLOGIC: No hives or eczema    Vital Signs Last 24 Hrs  T(C): 36.5 (06 Mar 2021 04:55), Max: 36.7 (05 Mar 2021 17:23)  T(F): 97.7 (06 Mar 2021 04:55), Max: 98 (05 Mar 2021 17:23)  HR: 65 (06 Mar 2021 04:55) (64 - 67)  BP: 104/55 (06 Mar 2021 04:55) (104/55 - 146/75)  BP(mean): --  RR: 18 (06 Mar 2021 04:55) (18 - 18)  SpO2: 99% (06 Mar 2021 04:55) (98% - 100%)    PHYSICAL EXAM:  GENERAL: NAD, well-groomed, well-developed  HEAD:  Atraumatic, Normocephalic  EYES: EOMI, PERRLA, conjunctiva and sclera clear  ENMT: No tonsillar erythema, exudates, or enlargement; Moist mucous membranes, Good dentition, No lesions  NECK: Supple, No JVD, Normal thyroid  NERVOUS SYSTEM:  Alert & Oriented X3, Good concentration; Motor Strength 5/5 B/L upper and lower extremities; DTRs 2+ intact and symmetric  CHEST/LUNG: Clear to percussion bilaterally; No rales, rhonchi, wheezing, or rubs  HEART: Regular rate and rhythm; No murmurs, rubs, or gallops  ABDOMEN: Soft, Nontender, Nondistended; Bowel sounds present  EXTREMITIES:  2+ Peripheral Pulses, No clubbing, cyanosis, or edema  LYMPH: No lymphadenopathy noted  SKIN: No rashes or lesions    LABS:                        11.4   8.39  )-----------( 425      ( 06 Mar 2021 07:37 )             34.6     03-06    141  |  107  |  11  ----------------------------<  103<H>  3.6   |  28  |  0.77    Ca    8.6      06 Mar 2021 07:37  Phos  3.7     03-06  Mg     1.6     03-06    TPro  7.3  /  Alb  2.9<L>  /  TBili  0.5  /  DBili  x   /  AST  37  /  ALT  44  /  AlkPhos  145<H>  03-06      Urinalysis Basic - ( 04 Mar 2021 17:31 )    Color: Yellow / Appearance: Clear / S.015 / pH: x  Gluc: x / Ketone: Negative  / Bili: Negative / Urobili: Negative mg/dL   Blood: x / Protein: 15 mg/dL / Nitrite: Negative   Leuk Esterase: Small / RBC: 0-2 /HPF / WBC 0-2   Sq Epi: x / Non Sq Epi: Few / Bacteria: Negative      CAPILLARY BLOOD GLUCOSE      POCT Blood Glucose.: 100 mg/dL (06 Mar 2021 11:29)  POCT Blood Glucose.: 120 mg/dL (06 Mar 2021 07:59)  POCT Blood Glucose.: 133 mg/dL (05 Mar 2021 21:03)  POCT Blood Glucose.: 105 mg/dL (05 Mar 2021 16:31)      RADIOLOGY & ADDITIONAL TESTS:    Imaging Personally Reviewed:  [X ] YES  [ ] NO    Consultant(s) Notes Reviewed:  [X ] YES  [ ] NO    Care Discussed with Consultants/Other Providers [ X] YES  [ ] NO
Patient is a 50y old  Female who presents with a chief complaint of gastroenteritis (08 Mar 2021 15:05)    HPI:  50 year old female PMH of DM, morbid obesity, HTN, IBS, PSH of cholecystectomy hx presenting to ED due to abdominal pain, severe, nausea/vomiting and diarrhea since yesterday. Noted profuse vomiting and diarrhea which has been very active - to the point of using adult diapers at home. No fevers or abdominal pain.     In past admissions she responds well to IVF and IV ABX for few days.  (04 Mar 2021 18:55)    INTERVAL HPI/OVERNIGHT EVENTS: no acute events    MEDICATIONS  (STANDING):  ciprofloxacin   IVPB 400 milliGRAM(s) IV Intermittent every 12 hours  enalapril 10 milliGRAM(s) Oral daily  enoxaparin Injectable 40 milliGRAM(s) SubCutaneous daily  furosemide    Tablet 40 milliGRAM(s) Oral daily  insulin glargine Injectable (LANTUS) 30 Unit(s) SubCutaneous at bedtime  insulin lispro Injectable (ADMELOG) 10 Unit(s) SubCutaneous three times a day before meals  metroNIDAZOLE  IVPB      metroNIDAZOLE  IVPB 500 milliGRAM(s) IV Intermittent every 8 hours  pantoprazole  Injectable 40 milliGRAM(s) IV Push daily  pregabalin 150 milliGRAM(s) Oral two times a day    MEDICATIONS  (PRN):  aluminum hydroxide/magnesium hydroxide/simethicone Suspension 30 milliLiter(s) Oral every 6 hours PRN Dyspepsia  cyclobenzaprine 10 milliGRAM(s) Oral two times a day PRN Muscle Spasm  loperamide 2 milliGRAM(s) Oral four times a day PRN loose BM  morphine  - Injectable 4 milliGRAM(s) IV Push every 4 hours PRN Severe Pain (7 - 10)  morphine  - Injectable 2 milliGRAM(s) IV Push every 6 hours PRN Moderate Pain (4 - 6)  ondansetron Injectable 4 milliGRAM(s) IV Push every 6 hours PRN Nausea and/or Vomiting  oxycodone    5 mG/acetaminophen 325 mG 2 Tablet(s) Oral every 4 hours PRN Mild Pain (1 - 3)  simethicone 80 milliGRAM(s) Chew three times a day PRN Gas      Allergies    amitriptyline (Other)    Intolerances        REVIEW OF SYSTEMS:  CONSTITUTIONAL:  fatigue  EYES: No eye pain, visual disturbances, or discharge  ENMT:  No difficulty hearing, tinnitus, vertigo; No sinus or throat pain  NECK: No pain or stiffness  BREASTS: No pain, masses, or nipple discharge  RESPIRATORY: No cough, wheezing, chills or hemoptysis; No shortness of breath  CARDIOVASCULAR: No chest pain, palpitations, dizziness, or leg swelling  GASTROINTESTINAL: No abdominal or epigastric pain. No nausea, vomiting, or hematemesis; No diarrhea or constipation. No melena or hematochezia.  GENITOURINARY: No dysuria, frequency, hematuria, or incontinence  NEUROLOGICAL: No headaches, memory loss, loss of strength, numbness, or tremors  SKIN: No itching, burning, rashes, or lesions   LYMPH NODES: No enlarged glands  ENDOCRINE: No heat or cold intolerance; No hair loss  MUSCULOSKELETAL: right foot pain   PSYCHIATRIC: No depression, anxiety, mood swings, or difficulty sleeping  HEME/LYMPH: No easy bruising, or bleeding gums  ALLERGY AND IMMUNOLOGIC: No hives or eczema    Vital Signs Last 24 Hrs  T(C): 36.5 (08 Mar 2021 17:29), Max: 36.8 (08 Mar 2021 13:49)  T(F): 97.7 (08 Mar 2021 17:29), Max: 98.3 (08 Mar 2021 13:49)  HR: 67 (08 Mar 2021 17:29) (65 - 97)  BP: 119/75 (08 Mar 2021 17:29) (110/65 - 137/68)  RR: 18 (08 Mar 2021 17:29) (18 - 19)  SpO2: 97% (08 Mar 2021 17:29) (95% - 97%)    PHYSICAL EXAM:  GENERAL: NAD, well-groomed, well-developed  HEAD:  Atraumatic, Normocephalic  EYES: EOMI, PERRLA, conjunctiva and sclera clear  ENMT: No tonsillar erythema, exudates, or enlargement; Moist mucous membranes, Good dentition, No lesions  NECK: Supple, No JVD, Normal thyroid  NERVOUS SYSTEM:  Alert & Oriented X3, Good concentration; Motor Strength 5/5 B/L upper and lower extremities; DTRs 2+ intact and symmetric  CHEST/LUNG: Clear to percussion bilaterally; No rales, rhonchi, wheezing, or rubs  HEART: Regular rate and rhythm; No murmurs, rubs, or gallops  ABDOMEN: Soft, tender nondistended; Bowel sounds present  EXTREMITIES:  2crampy joint pain and right foot pain benign exam   LYMPH: No lymphadenopathy noted  SKIN: No rashes or lesions    LABS:                        10.8   8.71  )-----------( 387      ( 08 Mar 2021 06:56 )             33.8     03-08    137  |  102  |  14  ----------------------------<  147<H>  3.8   |  29  |  0.86    Ca    8.3<L>      08 Mar 2021 06:56  Phos  3.7     03-08  Mg     1.8     03-08    TPro  7.2  /  Alb  2.9<L>  /  TBili  0.4  /  DBili  x   /  AST  36  /  ALT  44  /  AlkPhos  138<H>  03-07        CAPILLARY BLOOD GLUCOSE      POCT Blood Glucose.: 118 mg/dL (08 Mar 2021 16:10)  POCT Blood Glucose.: 121 mg/dL (08 Mar 2021 13:31)  POCT Blood Glucose.: 135 mg/dL (08 Mar 2021 08:17)  POCT Blood Glucose.: 161 mg/dL (07 Mar 2021 21:20)      RADIOLOGY & ADDITIONAL TESTS:  < from: MR Foot w/wo IV Cont, Right (03.08.21 @ 13:19) >  EXAM:  MR FOOT WAW IC RT                            PROCEDURE DATE:  03/08/2021          INTERPRETATION:  RIGHT FOOT MRI    CLINICAL INFORMATION: Right foot pain. Cellulitis.  TECHNIQUE: Multiplanar, multisequence MRI was obtained of the right footwithout the administration of intravenous contrast. 8 cc of Gadavist were administered intravenously. 2 cc were discarded..    FINDINGS:    No marrow signal abnormality. No marrow enhancement or edema. Flexor and extensor tendons are intact. There isatrophy of the abductor digiti minimi. Remaining musculature is unremarkable. No fluid collection. Scarring is present in the sinus Tarsi. Degenerative spurring along the dorsal aspect of the talonavicular and navicular cuneiform articulations. Thereis diffuse mild subcutaneous edema.    IMPRESSION:  No osteomyelitis. No abscess.    < end of copied text >    Imaging Personally Reviewed:  [ X] YES  [ ] NO    Consultant(s) Notes Reviewed:  [X ] YES  [ ] NO    Care Discussed with Consultants/Other Providers [ X] YES  [ ] NO
HPI:  50 year old female PMH of DM, morbid obesity, HTN, IBS, PSH of cholecystectomy hx presenting to ED due to abdominal pain, severe, nausea/vomiting and diarrhea since yesterday. Noted profuse vomiting and diarrhea which has been very active - to the point of using adult diapers at home. No fevers or abdominal pain.     In past admissions she responds well to IVF and IV ABX for few days.  (04 Mar 2021 18:55)      Patient is a 50y old  Female who presents with a chief complaint of abdominal pain (08 Mar 2021 19:20)      INTERVAL HPI/OVERNIGHT EVENTS: no acute events continues to complain of pain     MEDICATIONS  (STANDING):  ciprofloxacin   IVPB 400 milliGRAM(s) IV Intermittent every 12 hours  enalapril 10 milliGRAM(s) Oral daily  enoxaparin Injectable 40 milliGRAM(s) SubCutaneous daily  furosemide    Tablet 40 milliGRAM(s) Oral daily  insulin glargine Injectable (LANTUS) 30 Unit(s) SubCutaneous at bedtime  insulin lispro Injectable (ADMELOG) 10 Unit(s) SubCutaneous three times a day before meals  metroNIDAZOLE  IVPB      metroNIDAZOLE  IVPB 500 milliGRAM(s) IV Intermittent every 8 hours  pantoprazole  Injectable 40 milliGRAM(s) IV Push daily  pregabalin 150 milliGRAM(s) Oral two times a day    MEDICATIONS  (PRN):  aluminum hydroxide/magnesium hydroxide/simethicone Suspension 30 milliLiter(s) Oral every 6 hours PRN Dyspepsia  cyclobenzaprine 10 milliGRAM(s) Oral two times a day PRN Muscle Spasm  loperamide 2 milliGRAM(s) Oral four times a day PRN loose BM  morphine  - Injectable 4 milliGRAM(s) IV Push every 4 hours PRN Severe Pain (7 - 10)  morphine  - Injectable 2 milliGRAM(s) IV Push every 6 hours PRN Moderate Pain (4 - 6)  ondansetron Injectable 4 milliGRAM(s) IV Push every 6 hours PRN Nausea and/or Vomiting  oxycodone    5 mG/acetaminophen 325 mG 2 Tablet(s) Oral every 4 hours PRN Mild Pain (1 - 3)  simethicone 80 milliGRAM(s) Chew three times a day PRN Gas      Allergies    amitriptyline (Other)    Intolerances        REVIEW OF SYSTEMS:  CONSTITUTIONAL: No fever, weight loss, or fatigue  EYES: No eye pain, visual disturbances, or discharge  ENMT:  No difficulty hearing, tinnitus, vertigo; No sinus or throat pain  NECK: No pain or stiffness  RESPIRATORY: No cough, wheezing, chills or hemoptysis; No shortness of breath  CARDIOVASCULAR: No chest pain, palpitations, dizziness, or leg swelling  GASTROINTESTINAL: abdominal pain   GENITOURINARY: No dysuria, frequency, hematuria, or incontinence  NEUROLOGICAL: No headaches, memory loss, loss of strength, numbness, or tremors  SKIN: No itching, burning, rashes, or lesions   LYMPH NODES: No enlarged glands  ENDOCRINE: No heat or cold intolerance; No hair loss  MUSCULOSKELETAL: joint pain Right foot plantar midfoot tenderness, mild edema to right foot. There is no open lesions, abrasions, or signs of infectio  PSYCHIATRIC: No depression, anxiety, mood swings, or difficulty sleeping  HEME/LYMPH: No easy bruising, or bleeding gums  ALLERGY AND IMMUNOLOGIC: No hives or eczema    Vital Signs Last 24 Hrs  T(C): 36.8 (09 Mar 2021 17:56), Max: 36.9 (09 Mar 2021 11:09)  T(F): 98.3 (09 Mar 2021 17:56), Max: 98.5 (09 Mar 2021 11:09)  HR: 65 (09 Mar 2021 17:56) (62 - 72)  BP: 104/67 (09 Mar 2021 17:56) (104/67 - 131/76)  RR: 18 (09 Mar 2021 17:56) (17 - 18)  SpO2: 99% (09 Mar 2021 17:56) (96% - 99%)    PHYSICAL EXAM:  GENERAL: NAD, well-groomed, well-developed  HEAD:  Atraumatic, Normocephalic  EYES: EOMI, PERRLA, conjunctiva and sclera clear  ENMT: No tonsillar erythema, exudates, or enlargement; Moist mucous membranes, Good dentition, No lesions  NECK: Supple, No JVD, Normal thyroid  NERVOUS SYSTEM:  Alert & Oriented X3, Good concentration; Motor Strength 5/5 B/L upper and lower extremities; DTRs 2+ intact and symmetric  CHEST/LUNG: Clear to percussion bilaterally; No rales, rhonchi, wheezing, or rubs  HEART: Regular rate and rhythm; No murmurs, rubs, or gallops  ABDOMEN: Soft, Nontender, Nondistended; Bowel sounds present  EXTREMITIES:  2+ Peripheral Pulses, No clubbing, cyanosis, or edema  LYMPH: No lymphadenopathy noted  SKIN: No rashes or lesions    LABS:                        12.0   9.33  )-----------( 415      ( 09 Mar 2021 10:34 )             36.6     03-09    138  |  103  |  15  ----------------------------<  157<H>  3.8   |  29  |  0.89    Ca    8.6      09 Mar 2021 10:34  Phos  3.9     03-09  Mg     1.6     03-09    TPro  7.5  /  Alb  3.0<L>  /  TBili  0.2  /  DBili  x   /  AST  78<H>  /  ALT  76  /  AlkPhos  146<H>  03-09        CAPILLARY BLOOD GLUCOSE      POCT Blood Glucose.: 153 mg/dL (09 Mar 2021 16:13)  POCT Blood Glucose.: 187 mg/dL (09 Mar 2021 11:34)  POCT Blood Glucose.: 139 mg/dL (09 Mar 2021 07:46)  POCT Blood Glucose.: 112 mg/dL (08 Mar 2021 21:20)      RADIOLOGY & ADDITIONAL TESTS:  < from: MR Foot w/wo IV Cont, Right (03.08.21 @ 13:19) >  EXAM:  MR FOOT WAW IC RT                            PROCEDURE DATE:  03/08/2021          INTERPRETATION:  RIGHT FOOT MRI    CLINICAL INFORMATION: Right foot pain. Cellulitis.  TECHNIQUE: Multiplanar, multisequence MRI was obtained of the right footwithout the administration of intravenous contrast. 8 cc of Gadavist were administered intravenously. 2 cc were discarded..    FINDINGS:    No marrow signal abnormality. No marrow enhancement or edema. Flexor and extensor tendons are intact. There isatrophy of the abductor digiti minimi. Remaining musculature is unremarkable. No fluid collection. Scarring is present in the sinus Tarsi. Degenerative spurring along the dorsal aspect of the talonavicular and navicular cuneiform articulations. Thereis diffuse mild subcutaneous edema.    IMPRESSION:  No osteomyelitis. No abscess.    < end of copied text >  < from: Xray Foot AP + Lateral + Oblique, Right (03.07.21 @ 11:25) >    EXAM:  FOOT COMPLETE  3 VWS  RT                            PROCEDURE DATE:  03/07/2021          INTERPRETATION:  Right foot pain rule out foreign body    3 views right foot.    No fracture or malalignment. Calcaneal enthesopathy.  degenerative change in the midfoot and right first metatarsophalangeal joint. No opaque foreign body. Vascular calcification.    Impression: No opaque foreign body.No acute osseous abnormality. Degenerative change.        < end of copied text >    Imaging Personally Reviewed:  [ X] YES  [ ] NO    Consultant(s) Notes Reviewed:  [X ] YES  [ ] NO    Care Discussed with Consultants/Other Providers [ X] YES  [ ] NO

## 2021-03-13 LAB
GLIADIN PEPTIDE IGA SER-ACNC: 8.1 UNITS — SIGNIFICANT CHANGE UP
GLIADIN PEPTIDE IGA SER-ACNC: NEGATIVE — SIGNIFICANT CHANGE UP
GLIADIN PEPTIDE IGG SER-ACNC: <5 UNITS — SIGNIFICANT CHANGE UP
GLIADIN PEPTIDE IGG SER-ACNC: NEGATIVE — SIGNIFICANT CHANGE UP
TTG IGA SER-ACNC: 1.2 U/ML — SIGNIFICANT CHANGE UP
TTG IGA SER-ACNC: NEGATIVE — SIGNIFICANT CHANGE UP

## 2021-03-14 LAB
5OH-INDOLEACETATE UR-MCNC: 3.7 MG/G CREAT — SIGNIFICANT CHANGE UP
CREATININE, RNDM UR: 38 MG/DL — SIGNIFICANT CHANGE UP (ref 20–275)

## 2021-03-15 LAB — SURGICAL PATHOLOGY STUDY: SIGNIFICANT CHANGE UP

## 2021-03-19 DIAGNOSIS — I10 ESSENTIAL (PRIMARY) HYPERTENSION: ICD-10-CM

## 2021-03-19 DIAGNOSIS — F31.9 BIPOLAR DISORDER, UNSPECIFIED: ICD-10-CM

## 2021-03-19 DIAGNOSIS — E66.01 MORBID (SEVERE) OBESITY DUE TO EXCESS CALORIES: ICD-10-CM

## 2021-03-19 DIAGNOSIS — K58.1 IRRITABLE BOWEL SYNDROME WITH CONSTIPATION: ICD-10-CM

## 2021-03-19 DIAGNOSIS — E11.40 TYPE 2 DIABETES MELLITUS WITH DIABETIC NEUROPATHY, UNSPECIFIED: ICD-10-CM

## 2021-03-19 DIAGNOSIS — K58.9 IRRITABLE BOWEL SYNDROME WITHOUT DIARRHEA: ICD-10-CM

## 2021-03-19 DIAGNOSIS — K29.70 GASTRITIS, UNSPECIFIED, WITHOUT BLEEDING: ICD-10-CM

## 2021-03-19 DIAGNOSIS — K58.0 IRRITABLE BOWEL SYNDROME WITH DIARRHEA: ICD-10-CM

## 2021-04-01 ENCOUNTER — OUTPATIENT (OUTPATIENT)
Dept: OUTPATIENT SERVICES | Facility: HOSPITAL | Age: 51
LOS: 1 days | End: 2021-04-01
Payer: MEDICARE

## 2021-04-01 DIAGNOSIS — Z90.49 ACQUIRED ABSENCE OF OTHER SPECIFIED PARTS OF DIGESTIVE TRACT: Chronic | ICD-10-CM

## 2021-04-01 DIAGNOSIS — Z98.890 OTHER SPECIFIED POSTPROCEDURAL STATES: Chronic | ICD-10-CM

## 2021-04-06 ENCOUNTER — FORM ENCOUNTER (OUTPATIENT)
Age: 51
End: 2021-04-06

## 2021-04-16 ENCOUNTER — APPOINTMENT (OUTPATIENT)
Dept: HOME HEALTH SERVICES | Facility: HOME HEALTH | Age: 51
End: 2021-04-16
Payer: MEDICARE

## 2021-04-16 VITALS
RESPIRATION RATE: 16 BRPM | DIASTOLIC BLOOD PRESSURE: 90 MMHG | OXYGEN SATURATION: 95 % | SYSTOLIC BLOOD PRESSURE: 172 MMHG | HEART RATE: 74 BPM

## 2021-04-16 DIAGNOSIS — R41.3 OTHER AMNESIA: ICD-10-CM

## 2021-04-16 DIAGNOSIS — Z97.5 PRESENCE OF (INTRAUTERINE) CONTRACEPTIVE DEVICE: ICD-10-CM

## 2021-04-16 DIAGNOSIS — R42 DIZZINESS AND GIDDINESS: ICD-10-CM

## 2021-04-16 DIAGNOSIS — Z23 ENCOUNTER FOR IMMUNIZATION: ICD-10-CM

## 2021-04-16 DIAGNOSIS — M17.11 UNILATERAL PRIMARY OSTEOARTHRITIS, RIGHT KNEE: ICD-10-CM

## 2021-04-16 PROCEDURE — 99345 HOME/RES VST NEW HIGH MDM 75: CPT | Mod: 25

## 2021-04-16 PROCEDURE — 99497 ADVNCD CARE PLAN 30 MIN: CPT

## 2021-04-24 DIAGNOSIS — Z71.89 OTHER SPECIFIED COUNSELING: ICD-10-CM

## 2021-04-27 NOTE — PROGRESS NOTE ADULT - PROBLEM SELECTOR PLAN 1
[None] : None [Good understanding] : Patient has a good understanding of lifestyle changes and steps needed to achieve self management goal admitted to medicine  PPI BID for now with patient

## 2021-04-28 ENCOUNTER — APPOINTMENT (OUTPATIENT)
Dept: NEUROLOGY | Facility: CLINIC | Age: 51
End: 2021-04-28
Payer: MEDICARE

## 2021-04-28 VITALS
SYSTOLIC BLOOD PRESSURE: 175 MMHG | WEIGHT: 293 LBS | BODY MASS INDEX: 44.41 KG/M2 | DIASTOLIC BLOOD PRESSURE: 93 MMHG | HEIGHT: 68 IN | HEART RATE: 79 BPM

## 2021-04-28 VITALS — TEMPERATURE: 97.9 F

## 2021-04-28 DIAGNOSIS — R47.89 OTHER SPEECH DISTURBANCES: ICD-10-CM

## 2021-04-28 PROCEDURE — 99072 ADDL SUPL MATRL&STAF TM PHE: CPT

## 2021-04-28 PROCEDURE — 99205 OFFICE O/P NEW HI 60 MIN: CPT

## 2021-04-28 RX ORDER — LIRAGLUTIDE 6 MG/ML
18 INJECTION SUBCUTANEOUS DAILY
Refills: 0 | Status: DISCONTINUED | COMMUNITY
Start: 2020-10-27 | End: 2021-04-28

## 2021-04-29 ENCOUNTER — RX RENEWAL (OUTPATIENT)
Age: 51
End: 2021-04-29

## 2021-05-04 ENCOUNTER — RESULT REVIEW (OUTPATIENT)
Age: 51
End: 2021-05-04

## 2021-05-04 PROBLEM — M17.11 PRIMARY LOCALIZED OSTEOARTHRITIS OF RIGHT KNEE: Status: ACTIVE | Noted: 2021-04-16

## 2021-05-04 PROBLEM — R41.3 MEMORY LOSS: Status: ACTIVE | Noted: 2021-05-04

## 2021-05-04 NOTE — REVIEW OF SYSTEMS
[Fatigue] : fatigue [Negative] : Heme/Lymph [FreeTextEntry6] : Intermittent SOB since COVID infection.

## 2021-05-04 NOTE — REASON FOR VISIT
[Initial Evaluation] : an initial evaluation [Pre-Visit Preparation] : pre-visit preparation was done [FreeTextEntry2] : chart review

## 2021-05-04 NOTE — HEALTH RISK ASSESSMENT
[HRA Reviewed] : Health risk assessment reviewed [Independent] : managing finances [No falls in past year] : Patient reported no falls in the past year [No] : The patient does not have visual impairment [TimeGetUpGo] : 20

## 2021-05-04 NOTE — PHYSICAL EXAM
[No Acute Distress] : no acute distress [Well Nourished] : well nourished [Well Developed] : well developed [Normal Voice/Communication] : normal voice communication [Normal Sclera/Conjunctiva] : normal sclera/conjunctiva [PERRL] : pupils equal, round and reactive to light [EOMI] : extra ocular movement intact [Visual Fields Grossly Intact] : visual fields grossly intact [Normal Outer Ear/Nose] : the ears and nose were normal in appearance [Normal Oropharynx] : the oropharynx was normal [Normal TMs] : both tympanic membranes were normal [Normal Nasal Mucosa] : the nasal mucosa was normal [No JVD] : no jugular venous distention [Supple] : the neck was supple [No LAD] : no lymphadenopathy [Thyroid Normal, No Nodules] : the thyroid was normal and there were no nodules present [No Respiratory Distress] : no respiratory distress [Clear to Auscultation] : lungs were clear to auscultation bilaterally [No Accessory Muscle Use] : no accessory muscle use [Normal Rate] : heart rate was normal  [Regular Rhythm] : with a regular rhythm [Normal S1, S2] : normal S1 and S2 [No Murmurs] : no murmurs heard [No Edema] : there was no peripheral edema [Normal Bowel Sounds] : normal bowel sounds [Non Tender] : non-tender [Soft] : abdomen soft [Not Distended] : not distended [Normal Post Cervical Nodes] : no posterior cervical lymphadenopathy [Normal Anterior Cervical Nodes] : no anterior cervical lymphadenopathy [No CVA Tenderness] : no ~M costovertebral angle tenderness [No Spinal Tenderness] : no spinal tenderness [No Clubbing, Cyanosis] : no clubbing  or cyanosis of the fingernails [Normal Strength/Tone] : muscle strength and tone were normal [No Rash] : no rash [Cranial Nerves Intact] : cranial nerves 2-12 were intact [No Motor Deficits] : the motor exam was normal [No Gross Sensory Deficits] : no gross sensory deficits [Normal Reflexes] : deep tendon reflexes were 2+ and symmetric [Oriented x3] : oriented to person, place, and time [Normal Insight/Judgement] : insight and judgment were intact [de-identified] : Obese female seated on couch, pleasant, constricted affect. [de-identified] : Constricted affect, mood poor due to numerous medical problems & recent severe knee pain/arthritis. [de-identified] : Bilateral knees with joint swelling, R>L. Tenderness to palpation & ballotment of both knees. Full ROM but quite painful throughout. Walks with great effort with cane.

## 2021-05-04 NOTE — HISTORY OF PRESENT ILLNESS
[FreeTextEntry1] : [facility] [FreeTextEntry2] : COVID SCREEN:\par Patient or caretaker denies fever, cough, trouble breathing, rash, vomiting. Patient has not been in close contact with anyone who is COVID-19 positive, or suspected of having COVID-19.\par \par N95 mask, gloves, eye wear and gown (if indicated) used during visit: Yes.\par \par Total face to face time with patient is 60 min.\par \par HPI:\par 50yo F with bipolar II disorder, depression, class III obesity (41 kg/m2), IBS-D, T2DM, GERD, asthma. Seen today for admission to house calls program.\par \par Social/Home Environment:\par -[employer]\par -Lives in basement of home\par -[facility] 23 hrs/week.\par \par Interval Events:\par -[place] [facility] \par -2 weeks of shooting pain down to foot & up thigh, bilateral knee pain.\par -Saw orthopedic surgeon yesterday\par -Meniscus tear, sinovitis, R OA diagnosed. MRI planned.\par -R thigh swollen/enlarged, heat & redness. Discussed \par -Prior trial gabapentin, pregabalin\par -PCP Anirudh Maldonado, returns next week for bloodwork\par \par -COVID tested positive 1/17/2021, had minimal symptoms but now has chronic intermittent dyspnea. Asthma, albuterol only plus montelukast.\par \par -Bipolar II, sees [facility] for psychiatric care, on sertraline 50mg for "hormonal related mood changes" per PCP. Fibroids with heavy periods, has IUD in place.\par -Trazodone 100mg for sleep, carbamazepine prescribed by psychiatrist but pt "scared to take it".\par \par -Saw GI\par -Sees Dr. Avendaño ([facility]) as well.\par -Has been told she has celiac disease, but she has restricted gluten with no improvement.\par -S/p cholecystectomy\par -Colonoscopy 2018 s/p 2 polyp removal\par -Cholestyramine, dicyclomine so far no improvement\par -Still in process of workup with GI doctor -- next visit 5/2021\par \par -GERD, previously on esomeprazole\par -H/o "calcium buildup R hip, in hospital for a while with this" thus PPI discontinued\par \par -Complains of memory issues. States she had stroke in past shown on brain MRI. Empty sella syndrome.\par -Neurology referral today at pt request & with multiple unresolved concerns.\par \par Subjective:\par 1. Appetite/Weight: Class III obesity with BMI ~42\par 2. Gait/Falls: Walks with extreme difficulty due to R knee pain. Cane dependent.\par 3. Sleep: Poor. Did not elicit details.\par 4. BMs: No concerns\par 5. Urine: No concerns\par 6. Skin: No rash or ulcers.\par 7. Mood/Memory: Notes recent small memory lapses, occasional garbled speech (happens randomly), and depressed mood given her numerous medical problems & recent exacerbations. Follows with psychiatry.\par \par DME: [facility]\par \par ADVANCE CARE PLANNING:\par 1. Total Time Spent: 20 min\par 2. Participants: Myself, patient, SW\par 3. Discussion: Goals of Care, Advanced Directives, Health Care Agency, and Disease trajectory\par 4. Disease Trajectory: Discussed and reviewed that patient's health is currently stable, and the prognosis moving forward is unclear.\par 5. Prognosis, Based On Clinician Best Judgment: Indeterminate\par 6. Goals of Care: 1) Extend life, by hospitalization and aggressive measures if needed, 2) Avoid discomfort, 3) Manage medical problems at home where safely possible.\par 7. HCA: Friend Tony primary, cousin Shashank secondary.\par 8. MOLST Completed: CPR yes, intubation and trial ventilation, do hospitalize, no limitation on medical interventions, trial feeding tube, trial IVF, use antibiotics.\par 9. Hospice Benefit discussion deferred at this time.

## 2021-05-06 ENCOUNTER — APPOINTMENT (OUTPATIENT)
Dept: RHEUMATOLOGY | Facility: CLINIC | Age: 51
End: 2021-05-06
Payer: MEDICARE

## 2021-05-06 ENCOUNTER — RESULT REVIEW (OUTPATIENT)
Age: 51
End: 2021-05-06

## 2021-05-06 VITALS
HEART RATE: 80 BPM | SYSTOLIC BLOOD PRESSURE: 130 MMHG | RESPIRATION RATE: 17 BRPM | WEIGHT: 293 LBS | DIASTOLIC BLOOD PRESSURE: 78 MMHG | HEIGHT: 68 IN | BODY MASS INDEX: 44.41 KG/M2 | TEMPERATURE: 98 F

## 2021-05-06 DIAGNOSIS — Z78.9 OTHER SPECIFIED HEALTH STATUS: ICD-10-CM

## 2021-05-06 PROCEDURE — 20551 NJX 1 TENDON ORIGIN/INSJ: CPT | Mod: 59

## 2021-05-06 PROCEDURE — 99215 OFFICE O/P EST HI 40 MIN: CPT | Mod: 25

## 2021-05-06 RX ORDER — NIFEDIPINE 20 MG/1
CAPSULE ORAL
Refills: 0 | Status: COMPLETED | COMMUNITY

## 2021-05-06 RX ORDER — PANTOPRAZOLE SODIUM 20 MG/1
TABLET, DELAYED RELEASE ORAL
Refills: 0 | Status: COMPLETED | COMMUNITY

## 2021-05-06 RX ORDER — METHYLPRED ACET/NACL,ISO-OS/PF 40 MG/ML
40 VIAL (ML) INJECTION
Qty: 4 | Refills: 0 | Status: COMPLETED | OUTPATIENT
Start: 2021-05-06

## 2021-05-06 RX ORDER — MELOXICAM 15 MG/1
TABLET ORAL
Refills: 0 | Status: COMPLETED | COMMUNITY

## 2021-05-06 RX ORDER — ENALAPRIL MALEATE 10 MG/1
10 TABLET ORAL DAILY
Refills: 0 | Status: DISCONTINUED | COMMUNITY
Start: 2020-10-27 | End: 2021-05-06

## 2021-05-06 RX ORDER — CARBAMAZEPINE 200 MG/1
TABLET ORAL
Refills: 0 | Status: COMPLETED | COMMUNITY

## 2021-05-06 RX ADMIN — METHYLPREDNISOLONE ACETATE MG/ML: 40 INJECTION, SUSPENSION INTRA-ARTICULAR; INTRALESIONAL; INTRAMUSCULAR; SOFT TISSUE at 00:00

## 2021-05-06 NOTE — DATA REVIEWED
[FreeTextEntry1] : 3/10-12/2021:\par Hgb 11.3;  rest of CBC unremarkable\par AMAYA 1:80\par RF, CCP negative\par CRP 8mg/L

## 2021-05-06 NOTE — ASSESSMENT
[FreeTextEntry1] : 51 year old female presents with severe pain, swelling, and locking in her B/L hands.  Her overall presentation is consistent with trigger fingers of her B/L 3rd and 4th fingers, for which I have performed corticosteroid injections.  I have also ordered some bloodwork and x-rays as further workup to rule out a concurrent underlying inflammatory arthritis.  She will follow up with me again in 2-3 weeks  to review her results.\par

## 2021-05-06 NOTE — HISTORY OF PRESENT ILLNESS
[Arthralgias] : arthralgias [Joint Swelling] : joint swelling [Morning Stiffness] : morning stiffness [FreeTextEntry1] : 51 year old female with PMHx as listed below reports that about 8 months ago, her left 3rd and 4th fingers began to lock. She also began to experience "clicking" upon extension.\par About 3 months ago, she began to experience similar symptoms in the right 3rd and 4th fingers.  Since then, both hands have become painful and swollen.  \par The pain is constant, though worst with activity.  She describes the pain as dull at rest and sharp with activity, 8 out of 10.  (+)swelling, primarily in her PIP's.  (+)AM stiffness, usually lasting about 15-20 minutes.  She gets some relief from heat.  She has tried taking Aleve and topical diclofenac, but they did not help.  No other known alleviating factors.\par She also c/o pain in her B/L shoulders (left x 2 years, right x 9 months).\par No F/C, no unintentional weight loss, no night sweats, no oral ulcers, no rashes, no alopecia, no photosensitivity, no dry eyes/dry mouth, no Raynaud symptoms, no focal weakness, no dysphagia\par  [Anorexia] : no anorexia [Weight Loss] : no weight loss [Malaise] : no malaise [Fever] : no fever [Chills] : no chills [Fatigue] : no fatigue [Malar Facial Rash] : no malar facial rash [Skin Lesions] : no lesions [Skin Nodules] : no skin nodules [Oral Ulcers] : no oral ulcers [Cough] : no cough [Dry Mouth] : no dry mouth [Dysphonia] : no dysphonia [Dysphagia] : no dysphagia [Shortness of Breath] : no shortness of breath [Chest Pain] : no chest pain [Joint Warmth] : no joint warmth [Joint Deformity] : no joint deformity [Decreased ROM] : no decreased range of motion [Falls] : no falls

## 2021-05-06 NOTE — PHYSICAL EXAM
[General Appearance - Alert] : alert [General Appearance - In No Acute Distress] : in no acute distress [Sclera] : the sclera and conjunctiva were normal [Outer Ear] : the ears and nose were normal in appearance [Oropharynx] : the oropharynx was normal [Neck Appearance] : the appearance of the neck was normal [Neck Cervical Mass (___cm)] : no neck mass was observed [Jugular Venous Distention Increased] : there was no jugular-venous distention [Thyroid Diffuse Enlargement] : the thyroid was not enlarged [Thyroid Nodule] : there were no palpable thyroid nodules [Auscultation Breath Sounds / Voice Sounds] : lungs were clear to auscultation bilaterally [Heart Rate And Rhythm] : heart rate was normal and rhythm regular [Heart Sounds] : normal S1 and S2 [Heart Sounds Gallop] : no gallops [Murmurs] : no murmurs [Heart Sounds Pericardial Friction Rub] : no pericardial rub [Edema] : there was no peripheral edema [Bowel Sounds] : normal bowel sounds [Abdomen Soft] : soft [Abdomen Tenderness] : non-tender [Abdomen Mass (___ Cm)] : no abdominal mass palpated [Cervical Lymph Nodes Enlarged Posterior Bilaterally] : posterior cervical [Cervical Lymph Nodes Enlarged Anterior Bilaterally] : anterior cervical [Supraclavicular Lymph Nodes Enlarged Bilaterally] : supraclavicular [No Spinal Tenderness] : no spinal tenderness [Skin Turgor] : normal skin turgor [Skin Color & Pigmentation] : normal skin color and pigmentation [] : no rash [No Focal Deficits] : no focal deficits [Oriented To Time, Place, And Person] : oriented to person, place, and time [Impaired Insight] : insight and judgment were intact [Affect] : the affect was normal [FreeTextEntry1] : (+)tenderness on B/L 3rd and 4th PIP's, over B/L 3rd and 4th flexor tendon sheaths, triggering of B/L 3rd and 4th fingers;  right shoulder w/ painful decreased ROM in all planes;  right knee w/ (+)tenderness, painful decreased ROM

## 2021-05-06 NOTE — PROCEDURE
[Today's Date:] : Date: [unfilled] [Soft Tissue Injection] : soft tissue injection was performed [Patient] : the patient [Risks] : risks [Benefits] : benefits [Consent Obtained] : written consent was obtained prior to the procedure and is detailed in the patient's record [Alternatives] : alternatives [Therapeutic] : therapeutic [#1 Site: ______] : #1 site identified in the [unfilled] [#2 Site: ___] : # 2 site identified in the [unfilled] [#3 Site: ______] : # 3 site identified in the [unfilled] [#4 Site: ______] : # 4 site identified in the [unfilled] [Ethyl Chloride] : ethyl chloride [___ ml Inj] : [unfilled] ~Uml [1%] : 1% [Without Epi] : without epinephrine [Chlorhexidine] : chlorhexidine [25 gauge 5/8  inch] : A 25 gauge 5/8 inch needle was used [Depomedrol ___ mg] : Depomedrol [unfilled] mg [Tolerated Well] : the patient tolerated the procedure well [Reports Improvement in Symptoms] : reports improvement in symptoms [No Complications] : there were no complications [Instructions Given] : handouts/patient instructions were given to patient [Patient Instructed to Call] : patient was instructed to call if redness at site, a decrease in range of motion or an increase in pain is noted after procedure.

## 2021-05-10 ENCOUNTER — OUTPATIENT (OUTPATIENT)
Dept: OUTPATIENT SERVICES | Facility: HOSPITAL | Age: 51
LOS: 1 days | End: 2021-05-10
Payer: COMMERCIAL

## 2021-05-10 ENCOUNTER — APPOINTMENT (OUTPATIENT)
Dept: MRI IMAGING | Facility: CLINIC | Age: 51
End: 2021-05-10

## 2021-05-10 ENCOUNTER — APPOINTMENT (OUTPATIENT)
Dept: MRI IMAGING | Facility: CLINIC | Age: 51
End: 2021-05-10
Payer: MEDICARE

## 2021-05-10 DIAGNOSIS — Z98.890 OTHER SPECIFIED POSTPROCEDURAL STATES: Chronic | ICD-10-CM

## 2021-05-10 DIAGNOSIS — Z90.49 ACQUIRED ABSENCE OF OTHER SPECIFIED PARTS OF DIGESTIVE TRACT: Chronic | ICD-10-CM

## 2021-05-10 DIAGNOSIS — I63.9 CEREBRAL INFARCTION, UNSPECIFIED: ICD-10-CM

## 2021-05-10 PROCEDURE — 70549 MR ANGIOGRAPH NECK W/O&W/DYE: CPT

## 2021-05-10 PROCEDURE — 70553 MRI BRAIN STEM W/O & W/DYE: CPT | Mod: 26

## 2021-05-10 PROCEDURE — 70546 MR ANGIOGRAPH HEAD W/O&W/DYE: CPT

## 2021-05-10 PROCEDURE — A9585: CPT

## 2021-05-10 PROCEDURE — 70549 MR ANGIOGRAPH NECK W/O&W/DYE: CPT | Mod: 26

## 2021-05-10 PROCEDURE — 70546 MR ANGIOGRAPH HEAD W/O&W/DYE: CPT | Mod: 26,59

## 2021-05-10 PROCEDURE — 70553 MRI BRAIN STEM W/O & W/DYE: CPT

## 2021-05-11 RX ORDER — MELOXICAM 15 MG/1
15 TABLET ORAL
Qty: 30 | Refills: 0 | Status: DISCONTINUED | COMMUNITY
Start: 2021-05-06 | End: 2021-05-11

## 2021-05-13 ENCOUNTER — NON-APPOINTMENT (OUTPATIENT)
Age: 51
End: 2021-05-13

## 2021-05-13 DIAGNOSIS — E87.5 HYPERKALEMIA: ICD-10-CM

## 2021-05-13 LAB
ALBUMIN SERPL ELPH-MCNC: 4 G/DL
ALP BLD-CCNC: 167 U/L
ALT SERPL-CCNC: 24 U/L
ANION GAP SERPL CALC-SCNC: 14 MMOL/L
AST SERPL-CCNC: 21 U/L
BASOPHILS # BLD AUTO: 0.04 K/UL
BASOPHILS NFR BLD AUTO: 0.4 %
BILIRUB SERPL-MCNC: 0.2 MG/DL
BUN SERPL-MCNC: 27 MG/DL
CALCIUM SERPL-MCNC: 9.8 MG/DL
CHLORIDE SERPL-SCNC: 100 MMOL/L
CO2 SERPL-SCNC: 24 MMOL/L
CREAT SERPL-MCNC: 0.87 MG/DL
CRP SERPL-MCNC: 9 MG/L
EOSINOPHIL # BLD AUTO: 0.3 K/UL
EOSINOPHIL NFR BLD AUTO: 2.6 %
ERYTHROCYTE [SEDIMENTATION RATE] IN BLOOD BY WESTERGREN METHOD: 67 MM/HR
GLUCOSE SERPL-MCNC: 115 MG/DL
HCT VFR BLD CALC: 39.7 %
HGB BLD-MCNC: 12.9 G/DL
IMM GRANULOCYTES NFR BLD AUTO: 0.2 %
LYMPHOCYTES # BLD AUTO: 3.89 K/UL
LYMPHOCYTES NFR BLD AUTO: 34.1 %
MAN DIFF?: NORMAL
MCHC RBC-ENTMCNC: 30.2 PG
MCHC RBC-ENTMCNC: 32.5 GM/DL
MCV RBC AUTO: 93 FL
MONOCYTES # BLD AUTO: 0.47 K/UL
MONOCYTES NFR BLD AUTO: 4.1 %
NEUTROPHILS # BLD AUTO: 6.7 K/UL
NEUTROPHILS NFR BLD AUTO: 58.6 %
PLATELET # BLD AUTO: 527 K/UL
POTASSIUM SERPL-SCNC: 5.5 MMOL/L
PROT SERPL-MCNC: 7.9 G/DL
RBC # BLD: 4.27 M/UL
RBC # FLD: 12.7 %
RHEUMATOID FACT SER QL: 13 IU/ML
SODIUM SERPL-SCNC: 138 MMOL/L
THYROGLOB AB SERPL-ACNC: <20 IU/ML
THYROPEROXIDASE AB SERPL IA-ACNC: <10 IU/ML
WBC # FLD AUTO: 11.42 K/UL

## 2021-05-14 LAB
C3 SERPL-MCNC: 170 MG/DL
C4 SERPL-MCNC: 40 MG/DL
ENA RNP AB SER IA-ACNC: <0.2 AL
ENA SM AB SER IA-ACNC: <0.2 AL
ENA SS-A AB SER IA-ACNC: <0.2 AL
ENA SS-B AB SER IA-ACNC: <0.2 AL

## 2021-05-19 ENCOUNTER — NON-APPOINTMENT (OUTPATIENT)
Age: 51
End: 2021-05-19

## 2021-05-19 ENCOUNTER — RESULT REVIEW (OUTPATIENT)
Age: 51
End: 2021-05-19

## 2021-05-19 ENCOUNTER — APPOINTMENT (OUTPATIENT)
Dept: RADIOLOGY | Facility: CLINIC | Age: 51
End: 2021-05-19
Payer: MEDICARE

## 2021-05-19 ENCOUNTER — APPOINTMENT (OUTPATIENT)
Dept: RADIOLOGY | Facility: CLINIC | Age: 51
End: 2021-05-19

## 2021-05-19 ENCOUNTER — OUTPATIENT (OUTPATIENT)
Dept: OUTPATIENT SERVICES | Facility: HOSPITAL | Age: 51
LOS: 1 days | End: 2021-05-19
Payer: COMMERCIAL

## 2021-05-19 DIAGNOSIS — Z98.890 OTHER SPECIFIED POSTPROCEDURAL STATES: Chronic | ICD-10-CM

## 2021-05-19 DIAGNOSIS — M19.90 UNSPECIFIED OSTEOARTHRITIS, UNSPECIFIED SITE: ICD-10-CM

## 2021-05-19 DIAGNOSIS — Z90.49 ACQUIRED ABSENCE OF OTHER SPECIFIED PARTS OF DIGESTIVE TRACT: Chronic | ICD-10-CM

## 2021-05-19 LAB
ANA PAT FLD IF-IMP: ABNORMAL
ANA SER IF-ACNC: ABNORMAL
CCP AB SER IA-ACNC: <8 UNITS
DSDNA AB SER-ACNC: <12 IU/ML
RF+CCP IGG SER-IMP: NEGATIVE

## 2021-05-19 PROCEDURE — 73030 X-RAY EXAM OF SHOULDER: CPT

## 2021-05-19 PROCEDURE — 73130 X-RAY EXAM OF HAND: CPT

## 2021-05-19 PROCEDURE — 73030 X-RAY EXAM OF SHOULDER: CPT | Mod: 26,LT,RT

## 2021-05-19 PROCEDURE — 73130 X-RAY EXAM OF HAND: CPT | Mod: 26,LT,RT

## 2021-05-25 ENCOUNTER — NON-APPOINTMENT (OUTPATIENT)
Age: 51
End: 2021-05-25

## 2021-05-26 NOTE — BEHAVIORAL HEALTH ASSESSMENT NOTE - RISK ASSESSMENT
Ms Arias Low Acute Suicide Risk Chronic risk factors: mood disorder, prior 2 suicide attempts. Protective factors: young; female gender;  no hx of aggression/violence; no legal issues; no substance use; motivated for help; articulate; strong family support; stable domicile; access to health services. No acute risk factors identified

## 2021-05-28 ENCOUNTER — APPOINTMENT (OUTPATIENT)
Dept: RHEUMATOLOGY | Facility: CLINIC | Age: 51
End: 2021-05-28
Payer: MEDICARE

## 2021-05-28 VITALS — SYSTOLIC BLOOD PRESSURE: 130 MMHG | HEART RATE: 73 BPM | DIASTOLIC BLOOD PRESSURE: 72 MMHG

## 2021-05-28 PROCEDURE — 99214 OFFICE O/P EST MOD 30 MIN: CPT

## 2021-05-28 NOTE — ASSESSMENT
[FreeTextEntry1] : 51 year old female presented with severe pain, swelling, and locking in her B/L hands - appears to be multifactorial.  Pt had several trigger fingers, which have resolved s/p corticosteroid injections.  Now w/ persistent pain and swelling in multiple MCP's and wrists and elevated ESR/CRP, suggestive of inflammatory arthritis - ?seronegative RA.\par   - Trial of prednisone 10mg daiy x 1 week - pt to then call me to report if any improvement.\par

## 2021-05-28 NOTE — HISTORY OF PRESENT ILLNESS
[Arthralgias] : arthralgias [Joint Swelling] : joint swelling [Morning Stiffness] : morning stiffness [FreeTextEntry1] : B/L trigger fingers much improved s/p C-S injections at last visit, though still w/ some "locking" in her hands.  She continues to experience pain in her B/L MCP's (R>L) and shoulders (R>L). [Anorexia] : no anorexia [Weight Loss] : no weight loss [Malaise] : no malaise [Fever] : no fever [Chills] : no chills [Fatigue] : no fatigue [Malar Facial Rash] : no malar facial rash [Skin Lesions] : no lesions [Skin Nodules] : no skin nodules [Oral Ulcers] : no oral ulcers [Cough] : no cough [Dry Mouth] : no dry mouth [Dysphonia] : no dysphonia [Dysphagia] : no dysphagia [Shortness of Breath] : no shortness of breath [Chest Pain] : no chest pain [Joint Warmth] : no joint warmth [Joint Deformity] : no joint deformity [Decreased ROM] : no decreased range of motion [Falls] : no falls

## 2021-05-28 NOTE — PHYSICAL EXAM
[General Appearance - Alert] : alert [General Appearance - In No Acute Distress] : in no acute distress [Sclera] : the sclera and conjunctiva were normal [Outer Ear] : the ears and nose were normal in appearance [Oropharynx] : the oropharynx was normal [Neck Appearance] : the appearance of the neck was normal [Neck Cervical Mass (___cm)] : no neck mass was observed [Jugular Venous Distention Increased] : there was no jugular-venous distention [Thyroid Diffuse Enlargement] : the thyroid was not enlarged [Thyroid Nodule] : there were no palpable thyroid nodules [Auscultation Breath Sounds / Voice Sounds] : lungs were clear to auscultation bilaterally [Heart Rate And Rhythm] : heart rate was normal and rhythm regular [Heart Sounds] : normal S1 and S2 [Heart Sounds Gallop] : no gallops [Murmurs] : no murmurs [Heart Sounds Pericardial Friction Rub] : no pericardial rub [Edema] : there was no peripheral edema [Bowel Sounds] : normal bowel sounds [Abdomen Soft] : soft [Abdomen Tenderness] : non-tender [Abdomen Mass (___ Cm)] : no abdominal mass palpated [Cervical Lymph Nodes Enlarged Posterior Bilaterally] : posterior cervical [Supraclavicular Lymph Nodes Enlarged Bilaterally] : supraclavicular [Cervical Lymph Nodes Enlarged Anterior Bilaterally] : anterior cervical [No Spinal Tenderness] : no spinal tenderness [Skin Turgor] : normal skin turgor [Skin Color & Pigmentation] : normal skin color and pigmentation [] : no rash [No Focal Deficits] : no focal deficits [Oriented To Time, Place, And Person] : oriented to person, place, and time [Affect] : the affect was normal [Impaired Insight] : insight and judgment were intact [FreeTextEntry1] : (+)swelling/tenderness of all right 1st-3rd MCP's, left 2nd MCP, B/L wrists;  right shoulder w/ painful decreased ROM in all planes;  right knee w/ (+)tenderness, painful decreased ROM

## 2021-06-03 ENCOUNTER — APPOINTMENT (OUTPATIENT)
Dept: GASTROENTEROLOGY | Facility: CLINIC | Age: 51
End: 2021-06-03

## 2021-06-10 ENCOUNTER — APPOINTMENT (OUTPATIENT)
Dept: GASTROENTEROLOGY | Facility: CLINIC | Age: 51
End: 2021-06-10

## 2021-06-11 ENCOUNTER — TRANSCRIPTION ENCOUNTER (OUTPATIENT)
Age: 51
End: 2021-06-11

## 2021-06-11 ENCOUNTER — NON-APPOINTMENT (OUTPATIENT)
Age: 51
End: 2021-06-11

## 2021-06-14 NOTE — ED CDU PROVIDER SUBSEQUENT DAY NOTE - PMH
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 I stressed avoiding alcohol, benzodiazepines, muscle relaxants and sleep aids unless specifically okayed by a physician. The patient verbalized understanding of the proper use and possible adverse effects of opioids. All of the patient's questions and concerns were addressed. They were instructed to flush the remaining pills down the toilet if they did not need them for pain.

## 2021-06-16 ENCOUNTER — APPOINTMENT (OUTPATIENT)
Dept: HOME HEALTH SERVICES | Facility: HOME HEALTH | Age: 51
End: 2021-06-16
Payer: MEDICARE

## 2021-06-16 VITALS
DIASTOLIC BLOOD PRESSURE: 82 MMHG | RESPIRATION RATE: 16 BRPM | HEART RATE: 71 BPM | SYSTOLIC BLOOD PRESSURE: 148 MMHG | OXYGEN SATURATION: 97 %

## 2021-06-16 DIAGNOSIS — R60.9 EDEMA, UNSPECIFIED: ICD-10-CM

## 2021-06-16 DIAGNOSIS — Z87.898 PERSONAL HISTORY OF OTHER SPECIFIED CONDITIONS: ICD-10-CM

## 2021-06-16 PROCEDURE — G0506: CPT

## 2021-06-16 PROCEDURE — 99350 HOME/RES VST EST HIGH MDM 60: CPT | Mod: 25

## 2021-06-16 RX ORDER — LIDOCAINE 5% 700 MG/1
5 PATCH TOPICAL
Qty: 30 | Refills: 1 | Status: COMPLETED | COMMUNITY
Start: 2020-10-27 | End: 2021-06-16

## 2021-06-16 RX ORDER — CHOLESTYRAMINE 4 G/9G
4 POWDER, FOR SUSPENSION ORAL TWICE DAILY
Qty: 60 | Refills: 5 | Status: COMPLETED | COMMUNITY
Start: 2020-11-06 | End: 2021-06-16

## 2021-06-16 RX ORDER — CARBAMAZEPINE 400 MG/1
400 TABLET, EXTENDED RELEASE ORAL
Refills: 0 | Status: COMPLETED | COMMUNITY
Start: 2021-05-06 | End: 2021-06-16

## 2021-06-16 RX ORDER — ONDANSETRON 4 MG/1
4 TABLET ORAL EVERY 6 HOURS
Qty: 120 | Refills: 0 | Status: COMPLETED | COMMUNITY
Start: 2020-11-06 | End: 2021-06-16

## 2021-06-16 RX ORDER — TRAZODONE HYDROCHLORIDE 100 MG/1
100 TABLET ORAL DAILY
Qty: 90 | Refills: 3 | Status: COMPLETED | COMMUNITY
Start: 2021-04-16 | End: 2021-06-16

## 2021-06-16 RX ORDER — NIFEDIPINE 60 MG
60 TABLET, EXTENDED RELEASE ORAL
Refills: 0 | Status: COMPLETED | COMMUNITY
End: 2021-06-16

## 2021-06-16 RX ORDER — INSULIN ASPART 100 [IU]/ML
(70-30) 100 INJECTION, SUSPENSION SUBCUTANEOUS
Refills: 0 | Status: COMPLETED | COMMUNITY
Start: 2020-10-27 | End: 2021-06-16

## 2021-06-16 RX ORDER — TRAMADOL HYDROCHLORIDE 50 MG/1
50 TABLET, COATED ORAL DAILY
Refills: 0 | Status: COMPLETED | COMMUNITY
Start: 2020-10-27 | End: 2021-06-16

## 2021-06-16 NOTE — HISTORY OF PRESENT ILLNESS
[Patient] : patient [FreeTextEntry1] : [facility] [Free[facility]2] : COVID SCREEN:\par Patient or caretaker denies fever, cough, trouble breathing, rash, vomiting. Patient has not been in close contact with anyone who is COVID-19 positive, or suspected of having COVID-19.\par \par N95 mask, gloves, eye wear and gown (if indicated) used during visit: Yes.\par \par Total face to face time with patient is 60 min.\par \par HPI:\par 52yo F with bipolar II disorder, depression, class III obesity (41 kg/m2), IBS-D, T2DM, GERD, asthma. Seen today for routine followup visit.\par \par Social/Home Environment:\par -[employer]\par -Lives in basement of home\par -[facility] 23 hrs/week.\par \par Interval Events:\par -L sciatica injection done today by pain specialist\par -Flare of ulcerative colitis past 2 weeks, called our office. Nausea, vomiting, diarrhea.\par -Gas pain is prominent, but already taking simethicone 5-6 per day\par -Sees neurology 6/24/2021 for intercostal neuralgia, apparent MRI findings suggesting pseudotumor cerebri, empty sella syndrome, various bizarre visual phenomena & word-finding difficulty.\par -R shoulder pain, rotator cuff calcifications\par -Orthopedics f/u for R knee pain (had injection, some improvement, losing weight for surgery).\par -Knee pain has improved.\par -On furosemide 40mg daily, had peripheral edema. Ordering 2D echocardiogram.\par \par Subjective:\par 1. Appetite/Weight: Class III obesity with BMI ~42\par 2. Gait/Falls: Walks with extreme difficulty due to R knee pain. Cane dependent.\par 3. Sleep: Poor. Did not elicit details.\par 4. BMs: No concerns\par 5. Urine: No concerns\par 6. Skin: No rash or ulcers.\par 7. Mood/Memory: Notes recent small memory lapses, occasional garbled speech (happens randomly), and depressed mood given her numerous medical problems & recent exacerbations. Follows with psychiatry.\par \par DME: [facility]

## 2021-06-16 NOTE — REVIEW OF SYSTEMS
[Fatigue] : fatigue [Negative] : Psychiatric [FreeTextEntry6] : Intermittent SOB since COVID infection.

## 2021-06-16 NOTE — HEALTH RISK ASSESSMENT
[HRA Reviewed] : Health risk assessment reviewed [Independent] : managing finances [No falls in past year] : Patient reported no falls in the past year [No] : The patient does not have visual impairment [TimeGetUpGo] : 12

## 2021-06-16 NOTE — REASON FOR VISIT
[Pre-Visit Preparation] : pre-visit preparation was done [Follow-Up] : a follow-up visit [Intercurrent Specialty/Sub-specialty Visits] : the patient has intercurrent specialty/sub-specialty visits [FreeTextEntry2] : chart review [FreeTextEntry3] : rheum

## 2021-06-16 NOTE — COUNSELING
[Obese -  ( BMI  >29.9 )] : obese - ( BMI  >29.9 ) [TLC diet recommended] : TLC diet recommended [Non - Smoker] : non-smoker [Smoke/CO Detectors] : smoke/CO detectors [Use assistive device to avoid falls] : use assistive device to avoid falls [] : foot exam [Completed] : Aspirin use discussion completed [Improve exercise tolerance] : improve exercise tolerance [Improve mobility] : improve mobility [Improve weight] : improve weight [Improve pain control] : improve pain control [Decrease stress] : decrease stress [Decrease hospital use] : decrease hospital use [Minimize unnecessary interventions] : minimize unnecessary interventions [Maintain functional ability] : maintain functional ability [Patient/Caregiver has ___ understanding of disease process] : patient/caregiver has [unfilled] understanding of disease process [Completed Medical Orders for Life-Sustaining Treatment] : completed medical orders for life-sustaining treatment [Full Code] : Code Status: Full Code [No Limitations] : Treatment Guidelines: No limitations [Trial of Intubation] : Intubation: Trial of Intubation [[facility]: _____[facility]: [unfilled] [_____] : HCP: [unfilled] [ - New patient with 2 or more chronic conditions; CCM discussed and patient-centered care plan established] : New patient with 2 or more chronic conditions; CCM discussed and patient-centered care plan established

## 2021-06-16 NOTE — PHYSICAL EXAM
[No Acute Distress] : no acute distress [Well Nourished] : well nourished [Well Developed] : well developed [Normal Voice/Communication] : normal voice communication [Normal Sclera/Conjunctiva] : normal sclera/conjunctiva [PERRL] : pupils equal, round and reactive to light [EOMI] : extra ocular movement intact [Visual Fields Grossly Intact] : visual fields grossly intact [Normal Outer Ear/Nose] : the ears and nose were normal in appearance [Normal Oropharynx] : the oropharynx was normal [Normal TMs] : both tympanic membranes were normal [Normal Nasal Mucosa] : the nasal mucosa was normal [No JVD] : no jugular venous distention [Supple] : the neck was supple [No LAD] : no lymphadenopathy [No Respiratory Distress] : no respiratory distress [Thyroid Normal, No Nodules] : the thyroid was normal and there were no nodules present [Clear to Auscultation] : lungs were clear to auscultation bilaterally [No Accessory Muscle Use] : no accessory muscle use [Normal Rate] : heart rate was normal  [Normal S1, S2] : normal S1 and S2 [Regular Rhythm] : with a regular rhythm [No Murmurs] : no murmurs heard [No Edema] : there was no peripheral edema [Normal Bowel Sounds] : normal bowel sounds [Non Tender] : non-tender [Soft] : abdomen soft [Not Distended] : not distended [Normal Post Cervical Nodes] : no posterior cervical lymphadenopathy [Normal Anterior Cervical Nodes] : no anterior cervical lymphadenopathy [No CVA Tenderness] : no ~M costovertebral angle tenderness [No Spinal Tenderness] : no spinal tenderness [No Clubbing, Cyanosis] : no clubbing  or cyanosis of the fingernails [Normal Strength/Tone] : muscle strength and tone were normal [No Rash] : no rash [Cranial Nerves Intact] : cranial nerves 2-12 were intact [No Motor Deficits] : the motor exam was normal [No Gross Sensory Deficits] : no gross sensory deficits [Normal Reflexes] : deep tendon reflexes were 2+ and symmetric [Oriented x3] : oriented to person, place, and time [Normal Insight/Judgement] : insight and judgment were intact [de-identified] : Obese female seated on couch, pleasant. [de-identified] : Knee joint pain & swelling improved today. [de-identified] : Affect more reactive today. Mood remains poor due to health problems.

## 2021-06-24 ENCOUNTER — NON-APPOINTMENT (OUTPATIENT)
Age: 51
End: 2021-06-24

## 2021-06-24 ENCOUNTER — APPOINTMENT (OUTPATIENT)
Dept: NEUROLOGY | Facility: CLINIC | Age: 51
End: 2021-06-24

## 2021-06-30 ENCOUNTER — NON-APPOINTMENT (OUTPATIENT)
Age: 51
End: 2021-06-30

## 2021-06-30 ENCOUNTER — TRANSCRIPTION ENCOUNTER (OUTPATIENT)
Age: 51
End: 2021-06-30

## 2021-07-02 ENCOUNTER — APPOINTMENT (OUTPATIENT)
Dept: GASTROENTEROLOGY | Facility: CLINIC | Age: 51
End: 2021-07-02

## 2021-07-06 ENCOUNTER — APPOINTMENT (OUTPATIENT)
Dept: SURGERY | Facility: CLINIC | Age: 51
End: 2021-07-06

## 2021-07-10 ENCOUNTER — TRANSCRIPTION ENCOUNTER (OUTPATIENT)
Age: 51
End: 2021-07-10

## 2021-07-10 ENCOUNTER — EMERGENCY (EMERGENCY)
Facility: HOSPITAL | Age: 51
LOS: 0 days | Discharge: HOME | End: 2021-07-10
Attending: EMERGENCY MEDICINE
Payer: MEDICARE

## 2021-07-10 VITALS
RESPIRATION RATE: 19 BRPM | SYSTOLIC BLOOD PRESSURE: 117 MMHG | TEMPERATURE: 98 F | HEART RATE: 67 BPM | DIASTOLIC BLOOD PRESSURE: 49 MMHG | OXYGEN SATURATION: 98 %

## 2021-07-10 VITALS
WEIGHT: 293 LBS | HEART RATE: 72 BPM | HEIGHT: 68 IN | SYSTOLIC BLOOD PRESSURE: 125 MMHG | RESPIRATION RATE: 16 BRPM | OXYGEN SATURATION: 98 % | DIASTOLIC BLOOD PRESSURE: 66 MMHG | TEMPERATURE: 98 F

## 2021-07-10 DIAGNOSIS — R10.9 UNSPECIFIED ABDOMINAL PAIN: ICD-10-CM

## 2021-07-10 DIAGNOSIS — Z87.891 PERSONAL HISTORY OF NICOTINE DEPENDENCE: ICD-10-CM

## 2021-07-10 DIAGNOSIS — R07.9 CHEST PAIN, UNSPECIFIED: ICD-10-CM

## 2021-07-10 DIAGNOSIS — F31.9 BIPOLAR DISORDER, UNSPECIFIED: ICD-10-CM

## 2021-07-10 DIAGNOSIS — I10 ESSENTIAL (PRIMARY) HYPERTENSION: ICD-10-CM

## 2021-07-10 DIAGNOSIS — G62.9 POLYNEUROPATHY, UNSPECIFIED: ICD-10-CM

## 2021-07-10 DIAGNOSIS — E11.9 TYPE 2 DIABETES MELLITUS WITHOUT COMPLICATIONS: ICD-10-CM

## 2021-07-10 DIAGNOSIS — Z98.890 OTHER SPECIFIED POSTPROCEDURAL STATES: Chronic | ICD-10-CM

## 2021-07-10 DIAGNOSIS — F12.99 CANNABIS USE, UNSPECIFIED WITH UNSPECIFIED CANNABIS-INDUCED DISORDER: ICD-10-CM

## 2021-07-10 DIAGNOSIS — Z79.4 LONG TERM (CURRENT) USE OF INSULIN: ICD-10-CM

## 2021-07-10 DIAGNOSIS — Z90.49 ACQUIRED ABSENCE OF OTHER SPECIFIED PARTS OF DIGESTIVE TRACT: Chronic | ICD-10-CM

## 2021-07-10 DIAGNOSIS — M25.50 PAIN IN UNSPECIFIED JOINT: ICD-10-CM

## 2021-07-10 DIAGNOSIS — E11.40 TYPE 2 DIABETES MELLITUS WITH DIABETIC NEUROPATHY, UNSPECIFIED: ICD-10-CM

## 2021-07-10 DIAGNOSIS — K58.9 IRRITABLE BOWEL SYNDROME WITHOUT DIARRHEA: ICD-10-CM

## 2021-07-10 DIAGNOSIS — M25.511 PAIN IN RIGHT SHOULDER: ICD-10-CM

## 2021-07-10 LAB
ALBUMIN SERPL ELPH-MCNC: 2.9 G/DL — LOW (ref 3.3–5)
ALP SERPL-CCNC: 128 U/L — HIGH (ref 40–120)
ALT FLD-CCNC: 32 U/L — SIGNIFICANT CHANGE UP (ref 12–78)
ANION GAP SERPL CALC-SCNC: 6 MMOL/L — SIGNIFICANT CHANGE UP (ref 5–17)
APPEARANCE UR: CLEAR — SIGNIFICANT CHANGE UP
AST SERPL-CCNC: 17 U/L — SIGNIFICANT CHANGE UP (ref 15–37)
BACTERIA # UR AUTO: ABNORMAL
BASOPHILS # BLD AUTO: 0.04 K/UL — SIGNIFICANT CHANGE UP (ref 0–0.2)
BASOPHILS NFR BLD AUTO: 0.4 % — SIGNIFICANT CHANGE UP (ref 0–2)
BILIRUB SERPL-MCNC: 0.3 MG/DL — SIGNIFICANT CHANGE UP (ref 0.2–1.2)
BILIRUB UR-MCNC: NEGATIVE — SIGNIFICANT CHANGE UP
BUN SERPL-MCNC: 14 MG/DL — SIGNIFICANT CHANGE UP (ref 7–23)
CALCIUM SERPL-MCNC: 8.3 MG/DL — LOW (ref 8.5–10.1)
CHLORIDE SERPL-SCNC: 107 MMOL/L — SIGNIFICANT CHANGE UP (ref 96–108)
CO2 SERPL-SCNC: 29 MMOL/L — SIGNIFICANT CHANGE UP (ref 22–31)
COLOR SPEC: YELLOW — SIGNIFICANT CHANGE UP
CREAT SERPL-MCNC: 0.72 MG/DL — SIGNIFICANT CHANGE UP (ref 0.5–1.3)
DIFF PNL FLD: ABNORMAL
EOSINOPHIL # BLD AUTO: 0.26 K/UL — SIGNIFICANT CHANGE UP (ref 0–0.5)
EOSINOPHIL NFR BLD AUTO: 2.5 % — SIGNIFICANT CHANGE UP (ref 0–6)
EPI CELLS # UR: ABNORMAL
GLUCOSE SERPL-MCNC: 96 MG/DL — SIGNIFICANT CHANGE UP (ref 70–99)
GLUCOSE UR QL: NEGATIVE MG/DL — SIGNIFICANT CHANGE UP
HCG SERPL-ACNC: <1 MIU/ML — SIGNIFICANT CHANGE UP
HCT VFR BLD CALC: 34.2 % — LOW (ref 34.5–45)
HGB BLD-MCNC: 10.9 G/DL — LOW (ref 11.5–15.5)
IMM GRANULOCYTES NFR BLD AUTO: 0.4 % — SIGNIFICANT CHANGE UP (ref 0–1.5)
KETONES UR-MCNC: NEGATIVE — SIGNIFICANT CHANGE UP
LACTATE SERPL-SCNC: 1.3 MMOL/L — SIGNIFICANT CHANGE UP (ref 0.7–2)
LEUKOCYTE ESTERASE UR-ACNC: NEGATIVE — SIGNIFICANT CHANGE UP
LIDOCAIN IGE QN: 49 U/L — LOW (ref 73–393)
LYMPHOCYTES # BLD AUTO: 3.45 K/UL — HIGH (ref 1–3.3)
LYMPHOCYTES # BLD AUTO: 32.6 % — SIGNIFICANT CHANGE UP (ref 13–44)
MCHC RBC-ENTMCNC: 29.1 PG — SIGNIFICANT CHANGE UP (ref 27–34)
MCHC RBC-ENTMCNC: 31.9 GM/DL — LOW (ref 32–36)
MCV RBC AUTO: 91.4 FL — SIGNIFICANT CHANGE UP (ref 80–100)
MONOCYTES # BLD AUTO: 0.5 K/UL — SIGNIFICANT CHANGE UP (ref 0–0.9)
MONOCYTES NFR BLD AUTO: 4.7 % — SIGNIFICANT CHANGE UP (ref 2–14)
NEUTROPHILS # BLD AUTO: 6.29 K/UL — SIGNIFICANT CHANGE UP (ref 1.8–7.4)
NEUTROPHILS NFR BLD AUTO: 59.4 % — SIGNIFICANT CHANGE UP (ref 43–77)
NITRITE UR-MCNC: NEGATIVE — SIGNIFICANT CHANGE UP
NRBC # BLD: 0 /100 WBCS — SIGNIFICANT CHANGE UP (ref 0–0)
PH UR: 5 — SIGNIFICANT CHANGE UP (ref 5–8)
PLATELET # BLD AUTO: 455 K/UL — HIGH (ref 150–400)
POTASSIUM SERPL-MCNC: 4 MMOL/L — SIGNIFICANT CHANGE UP (ref 3.5–5.3)
POTASSIUM SERPL-SCNC: 4 MMOL/L — SIGNIFICANT CHANGE UP (ref 3.5–5.3)
PROT SERPL-MCNC: 7.7 GM/DL — SIGNIFICANT CHANGE UP (ref 6–8.3)
PROT UR-MCNC: NEGATIVE MG/DL — SIGNIFICANT CHANGE UP
RBC # BLD: 3.74 M/UL — LOW (ref 3.8–5.2)
RBC # FLD: 13.5 % — SIGNIFICANT CHANGE UP (ref 10.3–14.5)
RBC CASTS # UR COMP ASSIST: SIGNIFICANT CHANGE UP /HPF (ref 0–4)
SODIUM SERPL-SCNC: 142 MMOL/L — SIGNIFICANT CHANGE UP (ref 135–145)
SP GR SPEC: 1.01 — SIGNIFICANT CHANGE UP (ref 1.01–1.02)
TROPONIN I SERPL-MCNC: <.015 NG/ML — SIGNIFICANT CHANGE UP (ref 0.01–0.04)
TROPONIN I SERPL-MCNC: <.015 NG/ML — SIGNIFICANT CHANGE UP (ref 0.01–0.04)
UROBILINOGEN FLD QL: NEGATIVE MG/DL — SIGNIFICANT CHANGE UP
WBC # BLD: 10.58 K/UL — HIGH (ref 3.8–10.5)
WBC # FLD AUTO: 10.58 K/UL — HIGH (ref 3.8–10.5)
WBC UR QL: SIGNIFICANT CHANGE UP

## 2021-07-10 PROCEDURE — 93010 ELECTROCARDIOGRAM REPORT: CPT

## 2021-07-10 PROCEDURE — 74174 CTA ABD&PLVS W/CONTRAST: CPT | Mod: 26,MH

## 2021-07-10 PROCEDURE — 70450 CT HEAD/BRAIN W/O DYE: CPT | Mod: 26,MH

## 2021-07-10 PROCEDURE — 99285 EMERGENCY DEPT VISIT HI MDM: CPT

## 2021-07-10 PROCEDURE — 71045 X-RAY EXAM CHEST 1 VIEW: CPT | Mod: 26

## 2021-07-10 PROCEDURE — 71275 CT ANGIOGRAPHY CHEST: CPT | Mod: 26,MH

## 2021-07-10 RX ORDER — IBUPROFEN 200 MG
1 TABLET ORAL
Qty: 20 | Refills: 0
Start: 2021-07-10 | End: 2021-07-14

## 2021-07-10 RX ORDER — OXYCODONE HYDROCHLORIDE 5 MG/1
1 TABLET ORAL
Qty: 0 | Refills: 0 | DISCHARGE
Start: 2021-07-10 | End: 2021-07-12

## 2021-07-10 RX ORDER — KETOROLAC TROMETHAMINE 30 MG/ML
30 SYRINGE (ML) INJECTION ONCE
Refills: 0 | Status: DISCONTINUED | OUTPATIENT
Start: 2021-07-10 | End: 2021-07-10

## 2021-07-10 RX ORDER — LIDOCAINE 4 G/100G
1 CREAM TOPICAL
Qty: 10 | Refills: 0
Start: 2021-07-10 | End: 2021-07-14

## 2021-07-10 RX ORDER — CYCLOBENZAPRINE HYDROCHLORIDE 10 MG/1
10 TABLET, FILM COATED ORAL ONCE
Refills: 0 | Status: COMPLETED | OUTPATIENT
Start: 2021-07-10 | End: 2021-07-10

## 2021-07-10 RX ORDER — MORPHINE SULFATE 50 MG/1
4 CAPSULE, EXTENDED RELEASE ORAL ONCE
Refills: 0 | Status: DISCONTINUED | OUTPATIENT
Start: 2021-07-10 | End: 2021-07-10

## 2021-07-10 RX ORDER — METOCLOPRAMIDE HCL 10 MG
10 TABLET ORAL ONCE
Refills: 0 | Status: COMPLETED | OUTPATIENT
Start: 2021-07-10 | End: 2021-07-10

## 2021-07-10 RX ORDER — LIDOCAINE 4 G/100G
1 CREAM TOPICAL ONCE
Refills: 0 | Status: COMPLETED | OUTPATIENT
Start: 2021-07-10 | End: 2021-07-10

## 2021-07-10 RX ORDER — OXYCODONE HYDROCHLORIDE 5 MG/1
1 TABLET ORAL
Qty: 12 | Refills: 0
Start: 2021-07-10 | End: 2021-07-12

## 2021-07-10 RX ORDER — CEFDINIR 250 MG/5ML
1 POWDER, FOR SUSPENSION ORAL
Qty: 14 | Refills: 0
Start: 2021-07-10 | End: 2021-07-16

## 2021-07-10 RX ORDER — SODIUM CHLORIDE 9 MG/ML
1000 INJECTION INTRAMUSCULAR; INTRAVENOUS; SUBCUTANEOUS ONCE
Refills: 0 | Status: COMPLETED | OUTPATIENT
Start: 2021-07-10 | End: 2021-07-10

## 2021-07-10 RX ORDER — FAMOTIDINE 10 MG/ML
20 INJECTION INTRAVENOUS ONCE
Refills: 0 | Status: COMPLETED | OUTPATIENT
Start: 2021-07-10 | End: 2021-07-10

## 2021-07-10 RX ORDER — HYDROMORPHONE HYDROCHLORIDE 2 MG/ML
0.5 INJECTION INTRAMUSCULAR; INTRAVENOUS; SUBCUTANEOUS ONCE
Refills: 0 | Status: DISCONTINUED | OUTPATIENT
Start: 2021-07-10 | End: 2021-07-10

## 2021-07-10 RX ADMIN — Medication 10 MILLIGRAM(S): at 10:26

## 2021-07-10 RX ADMIN — MORPHINE SULFATE 4 MILLIGRAM(S): 50 CAPSULE, EXTENDED RELEASE ORAL at 10:41

## 2021-07-10 RX ADMIN — Medication 30 MILLIGRAM(S): at 13:37

## 2021-07-10 RX ADMIN — HYDROMORPHONE HYDROCHLORIDE 0.5 MILLIGRAM(S): 2 INJECTION INTRAMUSCULAR; INTRAVENOUS; SUBCUTANEOUS at 12:01

## 2021-07-10 RX ADMIN — SODIUM CHLORIDE 1000 MILLILITER(S): 9 INJECTION INTRAMUSCULAR; INTRAVENOUS; SUBCUTANEOUS at 12:30

## 2021-07-10 RX ADMIN — CYCLOBENZAPRINE HYDROCHLORIDE 10 MILLIGRAM(S): 10 TABLET, FILM COATED ORAL at 13:38

## 2021-07-10 RX ADMIN — SODIUM CHLORIDE 1000 MILLILITER(S): 9 INJECTION INTRAMUSCULAR; INTRAVENOUS; SUBCUTANEOUS at 11:30

## 2021-07-10 RX ADMIN — MORPHINE SULFATE 4 MILLIGRAM(S): 50 CAPSULE, EXTENDED RELEASE ORAL at 10:26

## 2021-07-10 RX ADMIN — Medication 30 MILLIGRAM(S): at 14:07

## 2021-07-10 RX ADMIN — HYDROMORPHONE HYDROCHLORIDE 0.5 MILLIGRAM(S): 2 INJECTION INTRAMUSCULAR; INTRAVENOUS; SUBCUTANEOUS at 12:16

## 2021-07-10 RX ADMIN — FAMOTIDINE 20 MILLIGRAM(S): 10 INJECTION INTRAVENOUS at 10:26

## 2021-07-10 NOTE — ED ADULT NURSE NOTE - OBJECTIVE STATEMENT
Pt presents to ED c/o 10/10 left flank pain radiating to umbilicus X3 weeks and new onset headache since last night. Pt denies fever/chills, SOB, CP, dizziness, N/V/D.

## 2021-07-10 NOTE — ED PROVIDER NOTE - CARE PROVIDERS DIRECT ADDRESSES
[email or web address]

## 2021-07-10 NOTE — ED PROVIDER NOTE - OBJECTIVE STATEMENT
Pertinent PMH/PSH/FHx/SHx and Review of Systems contained within:     50 y/o F hx of dm, htn, ibs, bipolar, gastritis; hx of intercoastal neuroglia, pseudotumor cerebri, right shoulder calcific tendinosis presents with c/o left flank pain x3 weeks, pt thought it was muscular but pain was getting worse and woke up this morning with headaches and photophobia; checked blood pressure and was elevated associated with right shoulder pain radiating to the right chest and looking online and thought she was having a AAA. denies fevers/chills, n/v/d, hematuria, or dysuria. hx of pylo but states this pain feels different and doesn't feel like her right sided intercoastal neuroglia.     No fever/chills, (+)photophobia, no eye pain/changes in vision, No ear pain/sore throat/dysphagia, (+) shoulder/chest pain, no palpitations, no SOB/cough/wheeze/stridor, No abdominal pain, No N/V/D, no dysuria/frequency/discharge, No neck/back pain, no rash, (+) headache Pertinent PMH/PSH/FHx/SHx and Review of Systems contained within:     50 y/o F hx of dm, htn, ibs, bipolar, gastritis, hx of intercoastal neuroglia, recent diagnosis of pseudotumor cerebri, right shoulder calcific tendinosi, h/o back pain with sciatica currently seeing pain management and referred to neurosurgeon, presents with c/o left flank pain x3 weeks, pt thought it was muscular but pain was getting worse and woke up this morning with genearlized headache and mild photophobia. pt checked BP and states it was elevated. Pt also reports noted episodes of sharp right shoulder pain radiating to the right chest lasting 1-2 min then resolving.  Pt concerned she was having a AAA. denies fevers/chills, n/v/d, hematuria, or dysuria. hx of pyelo but states this pain feels different and doesn't feel like her right sided intercoastal neuroglia.     No fever/chills, (+)photophobia, no eye pain/changes in vision, No ear pain/sore throat/dysphagia, (+) shoulder/chest pain, no palpitations, no SOB/cough/wheeze/stridor, No abdominal pain, No N/V/D, no dysuria/frequency/discharge, No neck/back pain, no rash, (+) headache

## 2021-07-10 NOTE — ED PROVIDER NOTE - NSFOLLOWUPINSTRUCTIONS_ED_ALL_ED_FT
Follow up with your primary care doctor within the next 24-48 hours and bring copy of your results.  Return to the Emergency Department for worsening or persistent symptoms or any other concerns incl. chest pain, shortness of breath, dizziness, inability to tolerate oral intake.  Rest, drink plenty of fluids.  Advance activity as tolerated.  Continue all previously prescribed medications as directed. You can use motrin 600mg every 6-8 hours for pain or fever,

## 2021-07-10 NOTE — ED ADULT TRIAGE NOTE - CHIEF COMPLAINT QUOTE
flank pain for three weeks , headache , rt shoulder pain . history of intercoastal neuroglia, Dm, HTN, vertigo , IBS and gall bladder removed 2016

## 2021-07-10 NOTE — ED PROVIDER NOTE - PATIENT PORTAL LINK FT
You can access the FollowMyHealth Patient Portal offered by [facility] by registering at the following website: [email or web address]. By joining [employer]’s FollowMyHealth portal, you will also be able to view your health information using other applications (apps) compatible with our system.

## 2021-07-10 NOTE — ED PROVIDER NOTE - CARE PLAN
Principal Discharge DX:	Flank pain  Secondary Diagnosis:	Shoulder pain, right  Secondary Diagnosis:	Chest pain

## 2021-07-10 NOTE — ED PROVIDER NOTE - PHYSICAL EXAMINATION
Gen: Alert, appears mildly in pain  Head: NC, AT, PERRL, normal lids/conjunctiva   ENT:L, patent oropharynx without erythema/exudate, uvula midline  Neck: supple, no tenderness/meningismus  Pulm: Bilateral clear BS, normal resp effort  CV: RRR, no M/R/G, +dist pulses   Abd: soft, NT/ND, +BS, no guarding/rebound tenderness, + lt flank tender to light palpation  Mskel: extremities x4 with normal ROM. no ctl spine ttp. no edema/erythema/cyanosis   Skin: no rash, no bruising  Neuro: AAOx3, no sensory/motor deficits, CN 2-12 intact

## 2021-07-10 NOTE — ED PROVIDER NOTE - CLINICAL SUMMARY MEDICAL DECISION MAKING FREE TEXT BOX
Pt comfortable, vss. CE neg x 2. CP atypical, likely stemming from pts right shoulder. left flank unclear - could be muscular vs bacteruria/pyelo vs radicular pain given pt w/o herniated disc in back. will rx cefdinir. lidocain patches. pt reports was on oxycodone by pain management but ran out. istop Reference #: [id number], noted 30 day supply filled  on 6/10. pt already on flexeril TID. will refer to ortho, cardio. advise pt to continue fu with pain management and neurosurgeon appt. ct noted, low suspicion for aortic injury and no obv pathology on CT

## 2021-07-10 NOTE — ED PROVIDER NOTE - CARE PROVIDER_API CALL
Jeremi Peck)  Cardiology; Cardiovascular Disease; Nuclear Cardiology  [address] [place] [address]  Phone: [phone]  Fax: [phone]  Follow Up Time:     Juan Kowalski)  Orthopaedic Surgery; Sports Medicine  [address] [facility], [facility] [address]  [place] [address]  Phone: [phone]  Fax: [phone]  Follow Up Time:

## 2021-07-12 ENCOUNTER — APPOINTMENT (OUTPATIENT)
Dept: HOME HEALTH SERVICES | Facility: HOME HEALTH | Age: 51
End: 2021-07-12

## 2021-07-12 ENCOUNTER — NON-APPOINTMENT (OUTPATIENT)
Age: 51
End: 2021-07-12

## 2021-07-12 LAB
CULTURE RESULTS: SIGNIFICANT CHANGE UP
SPECIMEN SOURCE: SIGNIFICANT CHANGE UP

## 2021-07-13 ENCOUNTER — APPOINTMENT (OUTPATIENT)
Dept: SURGERY | Facility: CLINIC | Age: 51
End: 2021-07-13
Payer: MEDICARE

## 2021-07-13 VITALS
HEIGHT: 68 IN | HEART RATE: 76 BPM | DIASTOLIC BLOOD PRESSURE: 82 MMHG | SYSTOLIC BLOOD PRESSURE: 150 MMHG | WEIGHT: 293 LBS | OXYGEN SATURATION: 99 % | BODY MASS INDEX: 44.41 KG/M2

## 2021-07-13 VITALS — TEMPERATURE: 97.3 F

## 2021-07-13 DIAGNOSIS — Z82.61 FAMILY HISTORY OF ARTHRITIS: ICD-10-CM

## 2021-07-13 DIAGNOSIS — F51.04 PSYCHOPHYSIOLOGIC INSOMNIA: ICD-10-CM

## 2021-07-13 DIAGNOSIS — Z82.3 FAMILY HISTORY OF STROKE: ICD-10-CM

## 2021-07-13 DIAGNOSIS — Z81.8 FAMILY HISTORY OF OTHER MENTAL AND BEHAVIORAL DISORDERS: ICD-10-CM

## 2021-07-13 DIAGNOSIS — Z80.3 FAMILY HISTORY OF MALIGNANT NEOPLASM OF BREAST: ICD-10-CM

## 2021-07-13 PROCEDURE — 99204 OFFICE O/P NEW MOD 45 MIN: CPT

## 2021-07-19 ENCOUNTER — NON-APPOINTMENT (OUTPATIENT)
Age: 51
End: 2021-07-19

## 2021-07-19 ENCOUNTER — APPOINTMENT (OUTPATIENT)
Dept: SPINE | Facility: CLINIC | Age: 51
End: 2021-07-19
Payer: MEDICARE

## 2021-07-19 VITALS
HEIGHT: 68 IN | HEART RATE: 76 BPM | DIASTOLIC BLOOD PRESSURE: 61 MMHG | SYSTOLIC BLOOD PRESSURE: 112 MMHG | WEIGHT: 293 LBS | BODY MASS INDEX: 44.41 KG/M2 | RESPIRATION RATE: 16 BRPM | OXYGEN SATURATION: 98 %

## 2021-07-19 PROCEDURE — 99204 OFFICE O/P NEW MOD 45 MIN: CPT

## 2021-07-22 ENCOUNTER — NON-APPOINTMENT (OUTPATIENT)
Age: 51
End: 2021-07-22

## 2021-07-23 ENCOUNTER — TRANSCRIPTION ENCOUNTER (OUTPATIENT)
Age: 51
End: 2021-07-23

## 2021-07-24 ENCOUNTER — TRANSCRIPTION ENCOUNTER (OUTPATIENT)
Age: 51
End: 2021-07-24

## 2021-07-26 ENCOUNTER — APPOINTMENT (OUTPATIENT)
Dept: HOME HEALTH SERVICES | Facility: HOME HEALTH | Age: 51
End: 2021-07-26
Payer: MEDICARE

## 2021-07-26 VITALS
HEART RATE: 75 BPM | SYSTOLIC BLOOD PRESSURE: 126 MMHG | RESPIRATION RATE: 16 BRPM | DIASTOLIC BLOOD PRESSURE: 78 MMHG | OXYGEN SATURATION: 99 %

## 2021-07-26 PROCEDURE — 99349 HOME/RES VST EST MOD MDM 40: CPT

## 2021-07-26 RX ORDER — CEFDINIR 300 MG/1
300 CAPSULE ORAL
Refills: 0 | Status: COMPLETED | COMMUNITY
Start: 2021-07-12 | End: 2021-07-26

## 2021-07-26 NOTE — COUNSELING
[Obese -  ( BMI  >29.9 )] : obese - ( BMI  >29.9 ) [TLC diet recommended] : TLC diet recommended [Non - Smoker] : non-smoker [Smoke/CO Detectors] : smoke/CO detectors [Use assistive device to avoid falls] : use assistive device to avoid falls [] : foot exam [Completed] : Aspirin use discussion completed [Improve exercise tolerance] : improve exercise tolerance [Improve mobility] : improve mobility [Improve weight] : improve weight [Improve pain control] : improve pain control [Decrease stress] : decrease stress [Decrease hospital use] : decrease hospital use [Minimize unnecessary interventions] : minimize unnecessary interventions [Maintain functional ability] : maintain functional ability [Patient/Caregiver has ___ understanding of disease process] : patient/caregiver has [unfilled] understanding of disease process [Completed Medical Orders for Life-Sustaining Treatment] : completed medical orders for life-sustaining treatment [Full Code] : Code Status: Full Code [No Limitations] : Treatment Guidelines: No limitations [Trial of Intubation] : Intubation: Trial of Intubation [[facility] Date: _____] [facility]last [facility]: [unfilled] [_____] : HCP: [unfilled]

## 2021-07-27 ENCOUNTER — TRANSCRIPTION ENCOUNTER (OUTPATIENT)
Age: 51
End: 2021-07-27

## 2021-07-27 ENCOUNTER — NON-APPOINTMENT (OUTPATIENT)
Age: 51
End: 2021-07-27

## 2021-07-27 ENCOUNTER — EMERGENCY (EMERGENCY)
Facility: HOSPITAL | Age: 51
LOS: 0 days | Discharge: HOME | End: 2021-07-27
Attending: STUDENT IN AN ORGANIZED HEALTH CARE EDUCATION/TRAINING PROGRAM
Payer: MEDICARE

## 2021-07-27 VITALS
HEART RATE: 96 BPM | HEIGHT: 68 IN | WEIGHT: 293 LBS | SYSTOLIC BLOOD PRESSURE: 120 MMHG | OXYGEN SATURATION: 98 % | RESPIRATION RATE: 16 BRPM | TEMPERATURE: 98 F | DIASTOLIC BLOOD PRESSURE: 73 MMHG

## 2021-07-27 DIAGNOSIS — E11.9 TYPE 2 DIABETES MELLITUS WITHOUT COMPLICATIONS: ICD-10-CM

## 2021-07-27 DIAGNOSIS — Z87.891 PERSONAL HISTORY OF NICOTINE DEPENDENCE: ICD-10-CM

## 2021-07-27 DIAGNOSIS — N12 TUBULO-INTERSTITIAL NEPHRITIS, NOT SPECIFIED AS ACUTE OR CHRONIC: ICD-10-CM

## 2021-07-27 DIAGNOSIS — G62.9 POLYNEUROPATHY, UNSPECIFIED: ICD-10-CM

## 2021-07-27 DIAGNOSIS — Z98.890 OTHER SPECIFIED POSTPROCEDURAL STATES: Chronic | ICD-10-CM

## 2021-07-27 DIAGNOSIS — K58.9 IRRITABLE BOWEL SYNDROME WITHOUT DIARRHEA: ICD-10-CM

## 2021-07-27 DIAGNOSIS — I10 ESSENTIAL (PRIMARY) HYPERTENSION: ICD-10-CM

## 2021-07-27 DIAGNOSIS — Z90.49 ACQUIRED ABSENCE OF OTHER SPECIFIED PARTS OF DIGESTIVE TRACT: Chronic | ICD-10-CM

## 2021-07-27 DIAGNOSIS — F12.99 CANNABIS USE, UNSPECIFIED WITH UNSPECIFIED CANNABIS-INDUCED DISORDER: ICD-10-CM

## 2021-07-27 LAB
ALBUMIN SERPL ELPH-MCNC: 2.9 G/DL — LOW (ref 3.3–5)
ALBUMIN SERPL ELPH-MCNC: 3.8 G/DL
ALP BLD-CCNC: 148 U/L
ALP SERPL-CCNC: 154 U/L — HIGH (ref 40–120)
ALT FLD-CCNC: 43 U/L — SIGNIFICANT CHANGE UP (ref 12–78)
ALT SERPL-CCNC: 29 U/L
ANION GAP SERPL CALC-SCNC: 13 MMOL/L
ANION GAP SERPL CALC-SCNC: 6 MMOL/L — SIGNIFICANT CHANGE UP (ref 5–17)
APPEARANCE UR: CLEAR — SIGNIFICANT CHANGE UP
APPEARANCE: CLEAR
AST SERPL-CCNC: 22 U/L
AST SERPL-CCNC: 34 U/L — SIGNIFICANT CHANGE UP (ref 15–37)
BACTERIA # UR AUTO: NEGATIVE — SIGNIFICANT CHANGE UP
BASOPHILS # BLD AUTO: 0.05 K/UL
BASOPHILS # BLD AUTO: 0.05 K/UL — SIGNIFICANT CHANGE UP (ref 0–0.2)
BASOPHILS NFR BLD AUTO: 0.3 % — SIGNIFICANT CHANGE UP (ref 0–2)
BASOPHILS NFR BLD AUTO: 0.4 %
BILIRUB SERPL-MCNC: 0.2 MG/DL
BILIRUB SERPL-MCNC: 0.3 MG/DL — SIGNIFICANT CHANGE UP (ref 0.2–1.2)
BILIRUB UR-MCNC: NEGATIVE — SIGNIFICANT CHANGE UP
BILIRUBIN URINE: NEGATIVE
BLOOD URINE: NORMAL
BUN SERPL-MCNC: 11 MG/DL
BUN SERPL-MCNC: 14 MG/DL — SIGNIFICANT CHANGE UP (ref 7–23)
CALCIUM SERPL-MCNC: 8.5 MG/DL — SIGNIFICANT CHANGE UP (ref 8.5–10.1)
CALCIUM SERPL-MCNC: 9.1 MG/DL
CHLORIDE SERPL-SCNC: 103 MMOL/L
CHLORIDE SERPL-SCNC: 106 MMOL/L — SIGNIFICANT CHANGE UP (ref 96–108)
CO2 SERPL-SCNC: 24 MMOL/L — SIGNIFICANT CHANGE UP (ref 22–31)
CO2 SERPL-SCNC: 26 MMOL/L
COLOR SPEC: YELLOW — SIGNIFICANT CHANGE UP
COLOR: YELLOW
CREAT SERPL-MCNC: 0.75 MG/DL
CREAT SERPL-MCNC: 0.98 MG/DL — SIGNIFICANT CHANGE UP (ref 0.5–1.3)
DIFF PNL FLD: ABNORMAL
EOSINOPHIL # BLD AUTO: 0.41 K/UL
EOSINOPHIL # BLD AUTO: 0.54 K/UL — HIGH (ref 0–0.5)
EOSINOPHIL NFR BLD AUTO: 3.5 %
EOSINOPHIL NFR BLD AUTO: 3.7 % — SIGNIFICANT CHANGE UP (ref 0–6)
EPI CELLS # UR: ABNORMAL
GLUCOSE QUALITATIVE U: NEGATIVE
GLUCOSE SERPL-MCNC: 138 MG/DL — HIGH (ref 70–99)
GLUCOSE SERPL-MCNC: 90 MG/DL
GLUCOSE UR QL: NEGATIVE MG/DL — SIGNIFICANT CHANGE UP
HCT VFR BLD CALC: 35.6 %
HCT VFR BLD CALC: 37.4 % — SIGNIFICANT CHANGE UP (ref 34.5–45)
HGB BLD-MCNC: 11.4 G/DL
HGB BLD-MCNC: 12 G/DL — SIGNIFICANT CHANGE UP (ref 11.5–15.5)
IMM GRANULOCYTES NFR BLD AUTO: 0.3 %
IMM GRANULOCYTES NFR BLD AUTO: 0.3 % — SIGNIFICANT CHANGE UP (ref 0–1.5)
KETONES UR-MCNC: NEGATIVE — SIGNIFICANT CHANGE UP
KETONES URINE: NEGATIVE
LACTATE SERPL-SCNC: 1.4 MMOL/L — SIGNIFICANT CHANGE UP (ref 0.7–2)
LEUKOCYTE ESTERASE UR-ACNC: NEGATIVE — SIGNIFICANT CHANGE UP
LEUKOCYTE ESTERASE URINE: NEGATIVE
LIDOCAIN IGE QN: 73 U/L — SIGNIFICANT CHANGE UP (ref 73–393)
LIDOCAIN IGE QN: 82 U/L — SIGNIFICANT CHANGE UP (ref 73–393)
LYMPHOCYTES # BLD AUTO: 3.6 K/UL
LYMPHOCYTES # BLD AUTO: 30.2 % — SIGNIFICANT CHANGE UP (ref 13–44)
LYMPHOCYTES # BLD AUTO: 4.35 K/UL — HIGH (ref 1–3.3)
LYMPHOCYTES NFR BLD AUTO: 31 %
MAN DIFF?: NORMAL
MCHC RBC-ENTMCNC: 29.9 PG — SIGNIFICANT CHANGE UP (ref 27–34)
MCHC RBC-ENTMCNC: 30.2 PG
MCHC RBC-ENTMCNC: 32 GM/DL
MCHC RBC-ENTMCNC: 32.1 GM/DL — SIGNIFICANT CHANGE UP (ref 32–36)
MCV RBC AUTO: 93.3 FL — SIGNIFICANT CHANGE UP (ref 80–100)
MCV RBC AUTO: 94.2 FL
MONOCYTES # BLD AUTO: 0.61 K/UL
MONOCYTES # BLD AUTO: 0.68 K/UL — SIGNIFICANT CHANGE UP (ref 0–0.9)
MONOCYTES NFR BLD AUTO: 4.7 % — SIGNIFICANT CHANGE UP (ref 2–14)
MONOCYTES NFR BLD AUTO: 5.3 %
NEUTROPHILS # BLD AUTO: 6.9 K/UL
NEUTROPHILS # BLD AUTO: 8.74 K/UL — HIGH (ref 1.8–7.4)
NEUTROPHILS NFR BLD AUTO: 59.5 %
NEUTROPHILS NFR BLD AUTO: 60.8 % — SIGNIFICANT CHANGE UP (ref 43–77)
NITRITE UR-MCNC: NEGATIVE — SIGNIFICANT CHANGE UP
NITRITE URINE: NEGATIVE
NRBC # BLD: 0 /100 WBCS — SIGNIFICANT CHANGE UP (ref 0–0)
PH UR: 5 — SIGNIFICANT CHANGE UP (ref 5–8)
PH URINE: 6
PLATELET # BLD AUTO: 480 K/UL — HIGH (ref 150–400)
PLATELET # BLD AUTO: 481 K/UL
POTASSIUM SERPL-MCNC: 4.1 MMOL/L — SIGNIFICANT CHANGE UP (ref 3.5–5.3)
POTASSIUM SERPL-SCNC: 4 MMOL/L
POTASSIUM SERPL-SCNC: 4.1 MMOL/L — SIGNIFICANT CHANGE UP (ref 3.5–5.3)
PROT SERPL-MCNC: 7.4 G/DL
PROT SERPL-MCNC: 8.2 GM/DL — SIGNIFICANT CHANGE UP (ref 6–8.3)
PROT UR-MCNC: NEGATIVE MG/DL — SIGNIFICANT CHANGE UP
PROTEIN URINE: NORMAL
RBC # BLD: 3.78 M/UL
RBC # BLD: 4.01 M/UL — SIGNIFICANT CHANGE UP (ref 3.8–5.2)
RBC # FLD: 13.8 % — SIGNIFICANT CHANGE UP (ref 10.3–14.5)
RBC # FLD: 13.9 %
RBC CASTS # UR COMP ASSIST: NEGATIVE /HPF — SIGNIFICANT CHANGE UP (ref 0–4)
SODIUM SERPL-SCNC: 136 MMOL/L — SIGNIFICANT CHANGE UP (ref 135–145)
SODIUM SERPL-SCNC: 141 MMOL/L
SP GR SPEC: 1.01 — SIGNIFICANT CHANGE UP (ref 1.01–1.02)
SPECIFIC GRAVITY URINE: 1.02
UROBILINOGEN FLD QL: NEGATIVE MG/DL — SIGNIFICANT CHANGE UP
UROBILINOGEN URINE: NORMAL
WBC # BLD: 14.41 K/UL — HIGH (ref 3.8–10.5)
WBC # FLD AUTO: 11.61 K/UL
WBC # FLD AUTO: 14.41 K/UL — HIGH (ref 3.8–10.5)
WBC UR QL: SIGNIFICANT CHANGE UP

## 2021-07-27 PROCEDURE — 93010 ELECTROCARDIOGRAM REPORT: CPT

## 2021-07-27 PROCEDURE — 71045 X-RAY EXAM CHEST 1 VIEW: CPT | Mod: 26

## 2021-07-27 PROCEDURE — 74177 CT ABD & PELVIS W/CONTRAST: CPT | Mod: 26,MA

## 2021-07-27 PROCEDURE — 99285 EMERGENCY DEPT VISIT HI MDM: CPT

## 2021-07-27 RX ORDER — CIPROFLOXACIN LACTATE 400MG/40ML
400 VIAL (ML) INTRAVENOUS ONCE
Refills: 0 | Status: COMPLETED | OUTPATIENT
Start: 2021-07-27 | End: 2021-07-27

## 2021-07-27 RX ORDER — SODIUM CHLORIDE 9 MG/ML
1000 INJECTION INTRAMUSCULAR; INTRAVENOUS; SUBCUTANEOUS ONCE
Refills: 0 | Status: COMPLETED | OUTPATIENT
Start: 2021-07-27 | End: 2021-07-27

## 2021-07-27 RX ORDER — MORPHINE SULFATE 50 MG/1
4 CAPSULE, EXTENDED RELEASE ORAL ONCE
Refills: 0 | Status: DISCONTINUED | OUTPATIENT
Start: 2021-07-27 | End: 2021-07-27

## 2021-07-27 RX ORDER — ONDANSETRON 8 MG/1
4 TABLET, FILM COATED ORAL ONCE
Refills: 0 | Status: COMPLETED | OUTPATIENT
Start: 2021-07-27 | End: 2021-07-27

## 2021-07-27 RX ORDER — KETOROLAC TROMETHAMINE 30 MG/ML
15 SYRINGE (ML) INJECTION ONCE
Refills: 0 | Status: DISCONTINUED | OUTPATIENT
Start: 2021-07-27 | End: 2021-07-27

## 2021-07-27 RX ORDER — MOXIFLOXACIN HYDROCHLORIDE TABLETS, 400 MG 400 MG/1
1 TABLET, FILM COATED ORAL
Qty: 20 | Refills: 0
Start: 2021-07-27 | End: 2021-08-05

## 2021-07-27 RX ADMIN — MORPHINE SULFATE 4 MILLIGRAM(S): 50 CAPSULE, EXTENDED RELEASE ORAL at 17:35

## 2021-07-27 RX ADMIN — Medication 15 MILLIGRAM(S): at 18:51

## 2021-07-27 RX ADMIN — Medication 200 MILLIGRAM(S): at 18:51

## 2021-07-27 RX ADMIN — ONDANSETRON 4 MILLIGRAM(S): 8 TABLET, FILM COATED ORAL at 17:36

## 2021-07-27 RX ADMIN — SODIUM CHLORIDE 1000 MILLILITER(S): 9 INJECTION INTRAMUSCULAR; INTRAVENOUS; SUBCUTANEOUS at 19:21

## 2021-07-27 RX ADMIN — MORPHINE SULFATE 4 MILLIGRAM(S): 50 CAPSULE, EXTENDED RELEASE ORAL at 22:29

## 2021-07-27 NOTE — ED ADULT NURSE NOTE - NSIMPLEMENTINTERV_GEN_ALL_ED
Implemented All Universal Safety Interventions:  [place] to call system. Call bell, personal items and telephone within reach. Instruct patient to call for assistance. Room bathroom lighting operational. Non-slip footwear when patient is off stretcher. Physically safe environment: no spills, clutter or unnecessary equipment. Stretcher in lowest position, wheels locked, appropriate side rails in place.

## 2021-07-27 NOTE — ED PROVIDER NOTE - CLINICAL SUMMARY MEDICAL DECISION MAKING FREE TEXT BOX
50 y/o F presenting with sx of pyelonephritis, given hx of stone, CT as pt may need Urology consult for possible stent, obtain labs, CT, treat pain.

## 2021-07-27 NOTE — ED PROVIDER NOTE - NS ED ROS FT
ROS: negative for fever, cough, headache, chest pain, shortness of breath, abd pain, nausea, vomiting, diarrhea, rash, paresthesia, and weakness, +b/l flank pain, back pain--all other systems reviewed are negative.

## 2021-07-27 NOTE — ED PROVIDER NOTE - OBJECTIVE STATEMENT
50 y/o F with PMHx of IBS, HTN, DM, Cardiac abnormality, Neuralgia and Neuropathy presents to the ED sent in by her visiting home physician for pyelonephritis and suspected stone. Pt reports she was diagnosed with a UTI earlier this month ad finished a course of Cefdinir. However, pt reports worsening sx with b/l flank pain and back pain. Pt states she had a sonogram today, with a suspected stone and concern for infection. Endorses dysuria and worse flank and RLQ pain with urination but denies fever/chills, SOB or N/V/D.

## 2021-07-27 NOTE — ED PROVIDER NOTE - PROGRESS NOTE DETAILS
AMARILIS PARADA: (+) right sided punctate renal calculus, should not be cause of flank pain. Partially treated w/ home abx of cefdinir. UA clear, leukocytosis 14k, Cr normal. Likely due to partially treated uti. urine cultures on july 10th were negative. Rx cipro and dc home  I have discussed with the patient about the ED workup, lab results, diagnostics results, plan for discharge home, need for follow-up with primary care physician/specialists, and return precautions. At this time, the patient does not require further workup in the ED. The patient is subjectively feeling better and would like to be discharged home. The patient had the opportunity to ask questions and I have answered all inquiries. The patient verbalizes understanding and agreement with the plan. The patient is hemodynamically stable, clinically well-appearing, ambulatory, mentating well and ready for discharge home.

## 2021-07-27 NOTE — ED ADULT NURSE NOTE - OBJECTIVE STATEMENT
pt came in from home, AOX4 and ambulatory with a cane, presenting with b/l flank pain x3 days. Patient describes pain as 10/10 and explains this has occurred a few years ago when she was diagnosed with a kidney infection. Pt took zofran last night because of intermittent nausea and bouts of diarrhea.

## 2021-07-27 NOTE — ED PROVIDER NOTE - NSFOLLOWUPINSTRUCTIONS_ED_ALL_ED_FT
Urinary Tract Infection    A urinary tract infection (UTI) is an infection of any part of the urinary tract, which includes the kidneys, ureters, bladder, and urethra. Risk factors include ignoring your need to urinate, wiping back to front if female, being an uncircumcised male, and having diabetes or a weak immune system. Symptoms include frequent urination, pain or burning with urination, foul smelling urine, cloudy urine, pain in the lower abdomen, blood in the urine, and fever. If you were prescribed an antibiotic medicine, take it as told by your health care provider. Do not stop taking the antibiotic even if you start to feel better.    SEEK IMMEDIATE MEDICAL CARE IF YOU HAVE ANY OF THE FOLLOWING SYMPTOMS: severe back or abdominal pain, fever, inability to keep fluids or medicine down, dizziness/lightheadedness, or a change in mental status.     PYELONEPHRITIS (KIDNEY INFECTION)    WHAT YOU NEED TO KNOW:  A kidney infection, or pyelonephritis, is a bacterial infection. The infection usually starts in your bladder or urethra and moves into your kidney. One or both kidneys may be infected.     DISCHARGE INSTRUCTIONS:  Seek care immediately if:   •You have a fever and chills.   •You cannot stop vomiting.  •You have severe pain in your abdomen, lower back, or sides.    Contact your healthcare provider if:   •You continue to have a fever after you take antibiotics for 3 days.  •You have pain when you urinate, even after treatment.  •Your signs and symptoms return.  •You have questions or concerns about your condition or care.    Medicines: You may need any of the following:   •Antibiotics treat your bacterial infection:  CEFPODOXIME 100MG, TWICE/DAY, FOR 10 DAYS  •Acetaminophen decreases pain and fever. It is available without a doctor's order. Ask how much to take and how often to take it. Follow directions. Read the labels of all other medicines you are using to see if they also contain acetaminophen, or ask your doctor or pharmacist. Acetaminophen can cause liver damage if not taken correctly. Do not use more than 4 grams (4,000 milligrams) total of acetaminophen in one day.   •NSAIDs, such as ibuprofen, help decrease swelling, pain, and fever. This medicine is available with or without a doctor's order. NSAIDs can cause stomach bleeding or kidney problems in certain people. If you take blood thinner medicine, always ask if NSAIDs are safe for you. Always read the medicine label and follow directions. Do not give these medicines to children under 6 months of age without direction from your child's healthcare provider.  •Prescription pain medicine may be given. Ask how to take this medicine safely.  •Take your medicine as directed. Contact your healthcare provider if you think your medicine is not helping or if you have side effects. Tell him of her if you are allergic to any medicine. Keep a list of the medicines, vitamins, and herbs you take. Include the amounts, and when and why you take them. Bring the list or the pill bottles to follow-up visits. Carry your medicine list with you in case of an emergency.      Drink liquids as directed: You may need to drink extra liquids to help flush your kidneys and urinary system. Water is the best liquid to drink. Ask your healthcare provider how much liquid to drink each day and which liquids are best for you.    Urinate as soon as you feel the urge: This will help flush bacteria from your urinary system. Do not wait or hold your urine for too long.     Clean your genital area every day with soap and water: Wipe from front to back after you urinate or have a bowel movement. Wear cotton underwear. Fabrics such as nylon and polyester can stay damp. This can increase your risk for infection. Urinate within 15 minutes after you have sex.    Follow up with your healthcare provider as directed: Write down your questions so you remember to ask them during your visits.     Advance activity as tolerated.  Continue all previously prescribed medications as directed unless otherwise instructed.  Follow up with your primary care physician in 48-72 hours- bring copies of your results.  Return to the ER for worsening or persistent symptoms, and/or ANY NEW OR CONCERNING SYMPTOMS. If you have issues obtaining follow up, please call: [phone] to obtain a doctor or specialist who takes your insurance in your area.  You may call [phone] to make an appointment with the internal medicine clinic.     Take acetaminophen 650 mg orally every 6-8 hours for pain control as needed. Please do not exceed 4,000 mg of acetaminophen during a 24 hours period. Acetaminophen can be found in many over-the-counter cold medications as well as opioid medications that may be given for pain.    Take ibuprofen (also known as MOTRIN or ADVIL) 400 mg orally every 6-8 hours for pain control as needed with food to avoid an upset stomach. Ibuprofen can be found in many over-the-counter medications. Please do not take ibuprofen if you have a bleeding disorder, stomach or gastrointestinal ulcer, or liver disease.    If needed, you can alternate these medications so that you can take one medication every 3 hours. For example, at noon take ibuprofen, then at 3PM take acetaminophen, then at 6PM take ibuprofen.    Rest, drink plenty of fluids.  Advance activity as tolerated.  Continue all previously prescribed medications as directed.  Follow up with your PMD 2-3 days and bring copies of your results.

## 2021-07-27 NOTE — ED ADULT TRIAGE NOTE - CHIEF COMPLAINT QUOTE
pt a&O x3 pt uses cane at baseline. pt biba c.o left flank/ lower back pain. Foul smelling and dark urine. pt recently treated with oral abx no relief and told to come back to ER for sono results of left kidney infection.

## 2021-07-27 NOTE — ED PROVIDER NOTE - PHYSICAL EXAMINATION
Gen: Alert, Well appearing. NAD    Head: NC, AT, PERRL, normal lids/conjunctiva   ENT: Bilateral TM WNL, patent oropharynx without erythema/exudate, uvula midline  Neck: supple, no tenderness/meningismus  Pulm: Bilateral clear BS, normal resp effort  CV: RRR, no M/R/G, +dist pulses   Abd: Right flank and b/l CVA tenderness, RLQ tenderness.   Mskel: extremities x4 with normal ROM. no ctl spine ttp. no edema/erythema/cyanosis   Skin: no rash, no bruising  Neuro: AAOx3, no sensory/motor deficits, CN 2-12 intact

## 2021-07-28 VITALS
TEMPERATURE: 98 F | SYSTOLIC BLOOD PRESSURE: 137 MMHG | OXYGEN SATURATION: 94 % | RESPIRATION RATE: 20 BRPM | HEART RATE: 83 BPM | DIASTOLIC BLOOD PRESSURE: 80 MMHG

## 2021-07-28 LAB
CULTURE RESULTS: SIGNIFICANT CHANGE UP
SPECIMEN SOURCE: SIGNIFICANT CHANGE UP

## 2021-07-29 ENCOUNTER — NON-APPOINTMENT (OUTPATIENT)
Age: 51
End: 2021-07-29

## 2021-07-29 NOTE — HISTORY OF PRESENT ILLNESS
[Patient] : patient [FreeTextEntry1] [facility] [Free[facility]Entry2] : COVID SCREEN:\par Patient or caretaker denies fever, cough, trouble breathing, rash, vomiting. Patient has not been in close contact with anyone who is COVID-19 positive, or suspected of having COVID-19.\par \par N95 mask, gloves, eye wear and gown (if indicated) used during visit: Yes.\par \par Total face to face time with patient is 30 min.\par \par HPI:\par 50yo F with bipolar II disorder, depression, class III obesity (41 kg/m2), IBS-D, T2DM, GERD, asthma. Seen today for routine followup visit.\par \par Social/Home Environment:\par -[employer]\par -Lives in basement of home\par -[employer] 23 hrs/week.\par \par Interval Events:\par -kidney pain, "feels like they're trying to escape from my back". States she had pyelonephritis in the past and it's reminiscent of that.\par -L flank progressed to bilateral flank pain.\par -Has had kidney stone in past.\par -Reviewed labs, urine were unremarkable.\par -no UTI symptoms of burning, foamy urine, odor\par -Did finish cefdinir 2 weeks ago following hospitalization, prescribed due to bacteriuria\par -R groin radiating pain\par -labs unremarkable\par -diarrhea continues but seems to think it's "not from IBS"\par -Order kidney ultrasound today stat, will tell them pt has no walkup, she can have exam done in 1st floor\par -Neurosurgeon 7/19/2021 -- candidate for spinal cord stimulator. Slight bulge/pinch of disc, referred back to pain management specialist.\par -ER visit recently for pain\par -Bariatric surgery -- weigh ins for 6 months. Was told to eat breakfast daily. Seeing nutritionist\par -Pain seems distractable, or to come in waves. Improves during conversation.\par -Discussed weight loss strategies at length.\par \par Subjective:\par 1. Appetite/Weight: Class III obesity with BMI ~42\par 2. Gait/Falls: Walks with extreme difficulty due to R knee pain. Cane dependent.\par 3. Sleep: Poor. Did not elicit details.\par 4. BMs: No concerns\par 5. Urine: No concerns\par 6. Skin: No rash or ulcers.\par 7. Mood/Memory: Notes recent small memory lapses, occasional garbled speech (happens randomly), and depressed mood given her numerous medical problems & recent exacerbations. Follows with psychiatry.\par \par DME: Cane

## 2021-07-29 NOTE — PHYSICAL EXAM
[No Acute Distress] : no acute distress [Well Nourished] : well nourished [Well Developed] : well developed [Normal Voice/Communication] : normal voice communication [Normal Sclera/Conjunctiva] : normal sclera/conjunctiva [PERRL] : pupils equal, round and reactive to light [EOMI] : extra ocular movement intact [Visual Fields Grossly Intact] : visual fields grossly intact [Normal Outer Ear/Nose] : the ears and nose were normal in appearance [Normal Oropharynx] : the oropharynx was normal [Normal TMs] : both tympanic membranes were normal [Normal Nasal Mucosa] : the nasal mucosa was normal [No JVD] : no jugular venous distention [Supple] : the neck was supple [No LAD] : no lymphadenopathy [Thyroid Normal, No Nodules] : the thyroid was normal and there were no nodules present [No Respiratory Distress] : no respiratory distress [Clear to Auscultation] : lungs were clear to auscultation bilaterally [No Accessory Muscle Use] : no accessory muscle use [Normal Rate] : heart rate was normal  [Regular Rhythm] : with a regular rhythm [Normal S1, S2] : normal S1 and S2 [No Murmurs] : no murmurs heard [No Edema] : there was no peripheral edema [Normal Bowel Sounds] : normal bowel sounds [Non Tender] : non-tender [Soft] : abdomen soft [Not Distended] : not distended [Normal Post Cervical Nodes] : no posterior cervical lymphadenopathy [Normal Anterior Cervical Nodes] : no anterior cervical lymphadenopathy [No CVA Tenderness] : no ~M costovertebral angle tenderness [No Spinal Tenderness] : no spinal tenderness [No Clubbing, Cyanosis] : no clubbing  or cyanosis of the fingernails [Normal Strength/Tone] : muscle strength and tone were normal [No Rash] : no rash [Cranial Nerves Intact] : cranial nerves 2-12 were intact [No Motor Deficits] : the motor exam was normal [No Gross Sensory Deficits] : no gross sensory deficits [Normal Reflexes] : deep tendon reflexes were 2+ and symmetric [Oriented x3] : oriented to person, place, and time [Normal Insight/Judgement] : insight and judgment were intact [de-identified] : Obese female seated on couch, pleasant. [de-identified] : Knee joint pain & swelling improved today. [de-identified] : Affect more reactive today. Mood remains poor due to health problems.

## 2021-07-29 NOTE — REASON FOR VISIT
[Follow-Up] : a follow-up visit [Pre-Visit Preparation] : pre-visit preparation was done [Intercurrent Specialty/Sub-specialty Visits] : the patient has intercurrent specialty/sub-specialty visits [FreeTextEntry2] : chart review [FreeTextEntry3] : bariatrics

## 2021-08-02 ENCOUNTER — NON-APPOINTMENT (OUTPATIENT)
Age: 51
End: 2021-08-02

## 2021-08-03 NOTE — CONSULT LETTER
[Dear  ___] : Dear  [unfilled], [Consult Letter:] : I had the pleasure of evaluating your patient, [unfilled]. [Consult Closing:] : Thank you very much for allowing me to participate in the care of this patient.  If you have any questions, please do not hesitate to contact me. [Sincerely,] : Sincerely, [DrDejuan  ___] : Dr. CAMERON [FreeTextEntry3] : Dr Varela

## 2021-08-03 NOTE — REASON FOR VISIT
[Initial Consult] : an initial consult for [Morbid Obesity (BMI>40)] : morbid obesity (bmi>40) [FreeTextEntry2] : For Bariatric Surgery

## 2021-08-03 NOTE — ASSESSMENT
[FreeTextEntry1] : ROBIN LÓPEZ is a 51 year old female with morbid obesity (BMI  54.89 kg/m2) for SLEEVE GASTRECTOMY.  Risks, benefits alternatives discussed. All questions answered. Risks discussed included: death, leak, stricture, DVT/PE, portomesenteric venous thrombosis, need for further procedures, tests or surgery, vitamin deficiency, gallstones, renal stones, inadequate weight loss, weight regain, need for open procedure, incisional hernia, bleeding, post-operative nausea/vomiting requiring hospitalization and intestinal obstruction.\par \par \par 1) Standard preoperative bariatric workup to include cardiology and pulmonology clearance, upper endoscopy with biopsy for H pylori was done in the March 2021 , and preoperative fasting blood work, abdominal Ultrasound.\par 2) Patient will need to meet with psychologist and nutritionist for preoperative counseling regarding dietary goals, lifestyle changes, and postoperative expectations\par 3) We will assess the need for a medically-supervised diet, if required by the patient's insurer\par 4) Patient must attend 2 information session with bariatric coordinator and team\par 5) Patient should call insurance to ensure coverage for bariatric surgery\par 6) Bariatric Coordinator \par 7) Follow up 3 month \par 8) Patient wishes to do monthly weigh ins in our office today will be first weigh in. \par

## 2021-08-03 NOTE — PHYSICAL EXAM
[Obese] : obese [Normal] : affect appropriate [de-identified] : Breath sounds equal and bilateral, no wheezing no rales or rhonchi  [de-identified] :  good S1, S2, no m/r/g bilateral  [de-identified] : Normoactive bowel sounds, soft and nontender, no hepatosplenomegaly or masses noted

## 2021-08-03 NOTE — HISTORY OF PRESENT ILLNESS
[de-identified] : ROBIN LÓPEZ is a 51 year old female who present in the office with morbid obesity (BMI  54.89 kg/m2)  for bariatric surgery consultation. Patient was referred by   Dr Pantoja  The patient has tried multiple methods to lose weight in the past including diets, Calorie-counting, restricted intake, portion control and exercise without any long term success.Patient has limited capacity for exercise due to excess weight and knee pain and she walks with the cane. Patient feels that weight loss surgery is the only option at this point for effective and clinically meaningful weight loss. Patient seek weight loss surgery for improvement of their activity level, health and comorbid conditions.Patient stated that she did start the process for Gastric Bypass and did not completed it at that time. She is interested in  Laparoscopic Sleeve Gastrectomy Her PMHX includes Asthma, Bipolar, Diabetes, GERD,IBS,Stroke, HTN Anxiety, Osteoarthritis, Vertigo.

## 2021-08-06 ENCOUNTER — TRANSCRIPTION ENCOUNTER (OUTPATIENT)
Age: 51
End: 2021-08-06

## 2021-08-11 ENCOUNTER — NON-APPOINTMENT (OUTPATIENT)
Age: 51
End: 2021-08-11

## 2021-08-11 ENCOUNTER — TRANSCRIPTION ENCOUNTER (OUTPATIENT)
Age: 51
End: 2021-08-11

## 2021-08-11 RX ORDER — OXYCODONE AND ACETAMINOPHEN 5; 325 MG/1; MG/1
5-325 TABLET ORAL
Qty: 60 | Refills: 0 | Status: DISCONTINUED | COMMUNITY
Start: 1900-01-01 | End: 2021-08-11

## 2021-08-11 RX ORDER — OXYCODONE AND ACETAMINOPHEN 5; 325 MG/1; MG/1
5-325 TABLET ORAL
Refills: 0 | Status: DISCONTINUED | COMMUNITY
Start: 2021-07-12 | End: 2021-08-11

## 2021-08-13 ENCOUNTER — NON-APPOINTMENT (OUTPATIENT)
Age: 51
End: 2021-08-13

## 2021-08-13 ENCOUNTER — APPOINTMENT (OUTPATIENT)
Dept: RADIOLOGY | Facility: CLINIC | Age: 51
End: 2021-08-13

## 2021-08-13 ENCOUNTER — RX RENEWAL (OUTPATIENT)
Age: 51
End: 2021-08-13

## 2021-08-13 LAB
C DIFF TOX GENS STL QL NAA+PROBE: NORMAL
CDIFF BY PCR: NOT DETECTED

## 2021-08-23 DIAGNOSIS — Z23 ENCOUNTER FOR IMMUNIZATION: ICD-10-CM

## 2021-08-24 ENCOUNTER — APPOINTMENT (OUTPATIENT)
Dept: NEUROLOGY | Facility: CLINIC | Age: 51
End: 2021-08-24

## 2021-09-07 NOTE — ED CDU PROVIDER INITIAL DAY NOTE - NSSTREETDRUGTY_GEN_ALL_CORE_SD
Spoke to pharmacy, medication requires a prior authorization. Reason for call changed from medication question to prior authorization for medication.      Prior authorization for Lansoprazole completed w/ Saundra on CoverMyMeds Key: [id number]    Phone: [phone]

## 2021-09-23 ENCOUNTER — NON-APPOINTMENT (OUTPATIENT)
Age: 51
End: 2021-09-23

## 2021-09-27 ENCOUNTER — APPOINTMENT (OUTPATIENT)
Dept: HOME HEALTH SERVICES | Facility: HOME HEALTH | Age: 51
End: 2021-09-27
Payer: MEDICARE

## 2021-09-27 VITALS
HEART RATE: 70 BPM | WEIGHT: 293 LBS | BODY MASS INDEX: 53.22 KG/M2 | DIASTOLIC BLOOD PRESSURE: 84 MMHG | OXYGEN SATURATION: 98 % | SYSTOLIC BLOOD PRESSURE: 144 MMHG

## 2021-09-27 DIAGNOSIS — Z87.898 PERSONAL HISTORY OF OTHER SPECIFIED CONDITIONS: ICD-10-CM

## 2021-09-27 DIAGNOSIS — H10.10 ACUTE ATOPIC CONJUNCTIVITIS, UNSPECIFIED EYE: ICD-10-CM

## 2021-09-27 DIAGNOSIS — R10.9 UNSPECIFIED ABDOMINAL PAIN: ICD-10-CM

## 2021-09-27 DIAGNOSIS — L73.2 HIDRADENITIS SUPPURATIVA: ICD-10-CM

## 2021-09-27 DIAGNOSIS — G47.34 IDIOPATHIC SLEEP RELATED NONOBSTRUCTIVE ALVEOLAR HYPOVENTILATION: ICD-10-CM

## 2021-09-27 PROCEDURE — G0008: CPT

## 2021-09-27 PROCEDURE — G0506: CPT

## 2021-09-27 PROCEDURE — 99349 HOME/RES VST EST MOD MDM 40: CPT | Mod: 25

## 2021-09-27 PROCEDURE — 90686 IIV4 VACC NO PRSV 0.5 ML IM: CPT

## 2021-09-27 RX ORDER — NIFEDIPINE 60 MG/1
60 TABLET, EXTENDED RELEASE ORAL DAILY
Qty: 90 | Refills: 3 | Status: COMPLETED | COMMUNITY
Start: 2021-06-16 | End: 2021-09-27

## 2021-09-27 NOTE — HISTORY OF PRESENT ILLNESS
[Patient] : patient [FreeTextEntry1] [facility] [FreeTextEntry2] : COVID SCREEN:\par Patient or caretaker denies fever, cough, trouble breathing, rash, vomiting. Patient has not been in close contact with anyone who is COVID-19 positive, or suspected of having COVID-19.\par \par N95 mask, gloves, eye wear and gown (if indicated) used during visit: Yes.\par \par Total face to face time with patient is 45 min.\par \par HPI:\par 50yo F with bipolar II disorder, depression, class III obesity (41 kg/m2), IBS-D, T2DM, GERD, asthma, hidradenitis suppurativa, nocturnal hypoxemia. 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 May need drainage -- making derm referral\par -Referred to lab by bariatrics on [address], but phone doesn't answer. Saved these numbers under contacts. Called & confirmed they do take walk-ins, Fauzia will return with printed orders in hand\par -Received Slimfast, protein shakes from bariatric team\par -Fluzone 0.5 given\par \par Subjective:\par 1. Appetite/Weight: Class III obesity with BMI ~42\par 2. Gait/Falls: Walks with great difficulty, knee pain improved. Manages few steps without cane.\par 3. Sleep: Improved with overnight oxygen therapy\par 4. BMs: No concerns\par 5. Urine: No concerns\par 6. Skin: No rash or ulcers.\par 7. Mood/Memory: Notes recent small memory lapses, occasional garbled speech (happens randomly), and depressed mood given her numerous medical problems & recent exacerbations. Follows with psychiatry.\par \par DME: Emmanuel

## 2021-09-27 NOTE — COUNSELING
[Obese -  ( BMI  >29.9 )] : obese - ( BMI  >29.9 ) [TLC diet recommended] : TLC diet recommended [Non - Smoker] : non-smoker [Smoke/CO Detectors] : smoke/CO detectors [Use assistive device to avoid falls] : use assistive device to avoid falls [] : foot exam [Completed] : Aspirin use discussion completed [Improve exercise tolerance] : improve exercise tolerance [Improve mobility] : improve mobility [Improve weight] : improve weight [Decrease stress] : decrease stress [Improve pain control] : improve pain control [Decrease hospital use] : decrease hospital use [Minimize unnecessary interventions] : minimize unnecessary interventions [Maintain functional ability] : maintain functional ability [Patient/Caregiver has ___ understanding of disease process] : patient/caregiver has [unfilled] understanding of disease process [Completed Medical Orders for Life-Sustaining Treatment] : completed medical orders for life-sustaining treatment [Full Code] : Code Status: Full Code [No Limitations] : Treatment Guidelines: No limitations [[facility]: _____] [facility]last verification date: [unfilled] [Trial of Intubation] : Intubation: Trial of Intubation [_____] : HCP: [unfilled] [ - New patient with 2 or more chronic conditions; CCM discussed and patient-centered care plan established] : New patient with 2 or more chronic conditions; CCM discussed and patient-centered care plan established

## 2021-09-27 NOTE — PHYSICAL EXAM
[No Acute Distress] : no acute distress [Well Nourished] : well nourished [Well Developed] : well developed [Normal Voice/Communication] : normal voice communication [PERRL] : pupils equal, round and reactive to light [Normal Sclera/Conjunctiva] : normal sclera/conjunctiva [EOMI] : extra ocular movement intact [Visual Fields Grossly Intact] : visual fields grossly intact [Normal Outer Ear/Nose] : the ears and nose were normal in appearance [Normal Oropharynx] : the oropharynx was normal [Normal TMs] : both tympanic membranes were normal [Normal Nasal Mucosa] : the nasal mucosa was normal [Supple] : the neck was supple [No JVD] : no jugular venous distention [No LAD] : no lymphadenopathy [Thyroid Normal, No Nodules] : the thyroid was normal and there were no nodules present [No Respiratory Distress] : no respiratory distress [Clear to Auscultation] : lungs were clear to auscultation bilaterally [No Accessory Muscle Use] : no accessory muscle use [Regular Rhythm] : with a regular rhythm [Normal Rate] : heart rate was normal  [Normal S1, S2] : normal S1 and S2 [No Murmurs] : no murmurs heard [No Edema] : there was no peripheral edema [Normal Bowel Sounds] : normal bowel sounds [Non Tender] : non-tender [Soft] : abdomen soft [Not Distended] : not distended [Normal Post Cervical Nodes] : no posterior cervical lymphadenopathy [Normal Anterior Cervical Nodes] : no anterior cervical lymphadenopathy [No CVA Tenderness] : no ~M costovertebral angle tenderness [No Spinal Tenderness] : no spinal tenderness [No Clubbing, Cyanosis] : no clubbing  or cyanosis of the fingernails [Normal Strength/Tone] : muscle strength and tone were normal [No Rash] : no rash [Cranial Nerves Intact] : cranial nerves 2-12 were intact [No Motor Deficits] : the motor exam was normal [No Gross Sensory Deficits] : no gross sensory deficits [Normal Reflexes] : deep tendon reflexes were 2+ and symmetric [Oriented x3] : oriented to person, place, and time [Normal Insight/Judgement] : insight and judgment were intact [de-identified] : Obese female seated on couch, pleasant. [de-identified] : Affect more reactive today. Mood remains poor due to health problems. [de-identified] : Knee joint pain & swelling improved today.

## 2021-10-05 ENCOUNTER — RX RENEWAL (OUTPATIENT)
Age: 51
End: 2021-10-05

## 2021-10-23 ENCOUNTER — NON-APPOINTMENT (OUTPATIENT)
Age: 51
End: 2021-10-23

## 2021-10-23 ENCOUNTER — TRANSCRIPTION ENCOUNTER (OUTPATIENT)
Age: 51
End: 2021-10-23

## 2021-11-02 ENCOUNTER — RX RENEWAL (OUTPATIENT)
Age: 51
End: 2021-11-02

## 2021-11-15 ENCOUNTER — NON-APPOINTMENT (OUTPATIENT)
Age: 51
End: 2021-11-15

## 2021-11-15 ENCOUNTER — TRANSCRIPTION ENCOUNTER (OUTPATIENT)
Age: 51
End: 2021-11-15

## 2021-11-15 DIAGNOSIS — M25.559 PAIN IN UNSPECIFIED HIP: ICD-10-CM

## 2021-11-16 LAB
ALBUMIN SERPL ELPH-MCNC: 3.9 G/DL
ALP BLD-CCNC: 119 U/L
ALT SERPL-CCNC: 34 U/L
ANION GAP SERPL CALC-SCNC: 16 MMOL/L
AST SERPL-CCNC: 29 U/L
BASOPHILS # BLD AUTO: 0.04 K/UL
BASOPHILS NFR BLD AUTO: 0.4 %
BILIRUB SERPL-MCNC: 0.3 MG/DL
BUN SERPL-MCNC: 13 MG/DL
CALCIUM SERPL-MCNC: 9.2 MG/DL
CHLORIDE SERPL-SCNC: 99 MMOL/L
CO2 SERPL-SCNC: 22 MMOL/L
CREAT SERPL-MCNC: 0.77 MG/DL
EOSINOPHIL # BLD AUTO: 0.18 K/UL
EOSINOPHIL NFR BLD AUTO: 1.6 %
ESTIMATED AVERAGE GLUCOSE: 194 MG/DL
HBA1C MFR BLD HPLC: 8.4 %
HCT VFR BLD CALC: 36.7 %
HGB BLD-MCNC: 11.8 G/DL
IMM GRANULOCYTES NFR BLD AUTO: 0.4 %
LYMPHOCYTES # BLD AUTO: 3.13 K/UL
LYMPHOCYTES NFR BLD AUTO: 28.1 %
MAN DIFF?: NORMAL
MCHC RBC-ENTMCNC: 29.3 PG
MCHC RBC-ENTMCNC: 32.2 GM/DL
MCV RBC AUTO: 91.1 FL
MONOCYTES # BLD AUTO: 0.55 K/UL
MONOCYTES NFR BLD AUTO: 4.9 %
NEUTROPHILS # BLD AUTO: 7.19 K/UL
NEUTROPHILS NFR BLD AUTO: 64.6 %
PLATELET # BLD AUTO: 502 K/UL
POTASSIUM SERPL-SCNC: 4.1 MMOL/L
PROT SERPL-MCNC: 7.1 G/DL
RBC # BLD: 4.03 M/UL
RBC # FLD: 12.9 %
SODIUM SERPL-SCNC: 138 MMOL/L
TSH SERPL-ACNC: 1.5 UIU/ML
WBC # FLD AUTO: 11.13 K/UL

## 2021-11-18 ENCOUNTER — TRANSCRIPTION ENCOUNTER (OUTPATIENT)
Age: 51
End: 2021-11-18

## 2021-11-19 ENCOUNTER — APPOINTMENT (OUTPATIENT)
Dept: HOME HEALTH SERVICES | Facility: HOME HEALTH | Age: 51
End: 2021-11-19
Payer: MEDICARE

## 2021-11-19 VITALS
SYSTOLIC BLOOD PRESSURE: 126 MMHG | OXYGEN SATURATION: 99 % | DIASTOLIC BLOOD PRESSURE: 84 MMHG | TEMPERATURE: 98.5 F | HEART RATE: 92 BPM

## 2021-11-19 DIAGNOSIS — R50.9 FEVER, UNSPECIFIED: ICD-10-CM

## 2021-11-19 PROCEDURE — 99349 HOME/RES VST EST MOD MDM 40: CPT

## 2021-11-19 NOTE — HISTORY OF PRESENT ILLNESS
[Patient] : patient [FreeTextEntry1] [facility] [[facility]2] : COVID SCREEN:\par Patient or caretaker denies fever, cough, trouble breathing, rash, vomiting. Patient has not been in close contact with anyone who is COVID-19 positive, or suspected of having COVID-19.\par \par N95 mask, gloves, eye wear and gown (if indicated) used during visit: Yes.\par \par Total face to face time with patient is 45 min.\par \par HPI:\par 50yo F with bipolar II disorder, depression, class III obesity (41 kg/m2), IBS-D, T2DM, GERD, asthma, hidradenitis suppurativa, nocturnal hypoxemia. Seen today for routine followup visit.\par \par Social/Home Environment:\par -[employer]\par -Lives in basement of home\par -[facility] 23 hrs/week.\par \par Interval Events:\par -Interviewed alone\par -Labs to investigate lymphadenopathy and FUO -- RVP, LDH, sed rate, CRP, RF, syphilis screening, HIV screen, TB screen. \par -Discussed somatic disorders. Fauzia is upset about the notion that her symptoms, including low-grade temperatures she has noted to 99.9F, could be psychosomatic in origin, and points out that her "life has been going fine" and her mood hasn't been down. Overall, set expectations that we may not get a definitive answer even with an extensive workup, and we may be in a position where a psychosomatic source makes the most sense.\par -Sees Dr. Ralph, rheumatologist, 12/10/2021\par \par Subjective:\par 1. Appetite/Weight: Class III obesity with BMI ~42\par 2. Gait/Falls: Walks with great difficulty, knee pain improved. Manages few steps without cane.\par 3. Sleep: Improved with overnight oxygen therapy\par 4. BMs: No concerns\par 5. Urine: No concerns\par 6. Skin: No rash or ulcers.\par 7. Mood/Memory: Notes recent small memory lapses, occasional garbled speech (happens randomly), and depressed mood given her numerous medical problems & recent exacerbations. Follows with psychiatry.\par \par DME: Cane

## 2021-11-19 NOTE — COUNSELING
[Obese -  ( BMI  >29.9 )] : obese - ( BMI  >29.9 ) [TLC diet recommended] : TLC diet recommended [Non - Smoker] : non-smoker [Smoke/CO Detectors] : smoke/CO detectors [Use assistive device to avoid falls] : use assistive device to avoid falls [] : foot exam [Completed] : Aspirin use discussion completed [Improve exercise tolerance] : improve exercise tolerance [Improve mobility] : improve mobility [Improve weight] : improve weight [Improve pain control] : improve pain control [Decrease stress] : decrease stress [Decrease hospital use] : decrease hospital use [Minimize unnecessary interventions] : minimize unnecessary interventions [Maintain functional ability] : maintain functional ability [Patient/Caregiver has ___ understanding of disease process] : patient/caregiver has [unfilled] understanding of disease process [Completed Medical Orders for Life-Sustaining Treatment] : completed medical orders for life-sustaining treatment [Full Code] : Code Status: Full Code [No Limitations] : Treatment Guidelines: No limitations [Trial of Intubation] : Intubation: Trial of Intubation [[facility] Date: _____] [facility]/last verification date: [unfilled] [_____] : HCP: [unfilled]

## 2021-11-19 NOTE — PHYSICAL EXAM
[No Acute Distress] : no acute distress [Well Nourished] : well nourished [Well Developed] : well developed [Normal Voice/Communication] : normal voice communication [Normal Sclera/Conjunctiva] : normal sclera/conjunctiva [PERRL] : pupils equal, round and reactive to light [EOMI] : extra ocular movement intact [Visual Fields Grossly Intact] : visual fields grossly intact [Normal Outer Ear/Nose] : the ears and nose were normal in appearance [Normal Oropharynx] : the oropharynx was normal [Normal TMs] : both tympanic membranes were normal [Normal Nasal Mucosa] : the nasal mucosa was normal [No JVD] : no jugular venous distention [Supple] : the neck was supple [No LAD] : no lymphadenopathy [Thyroid Normal, No Nodules] : the thyroid was normal and there were no nodules present [No Respiratory Distress] : no respiratory distress [Clear to Auscultation] : lungs were clear to auscultation bilaterally [No Accessory Muscle Use] : no accessory muscle use [Normal Rate] : heart rate was normal  [Regular Rhythm] : with a regular rhythm [Normal S1, S2] : normal S1 and S2 [No Murmurs] : no murmurs heard [No Edema] : there was no peripheral edema [Normal Bowel Sounds] : normal bowel sounds [Non Tender] : non-tender [Soft] : abdomen soft [Not Distended] : not distended [Normal Post Cervical Nodes] : no posterior cervical lymphadenopathy [Normal Anterior Cervical Nodes] : no anterior cervical lymphadenopathy [No CVA Tenderness] : no ~M costovertebral angle tenderness [No Spinal Tenderness] : no spinal tenderness [No Clubbing, Cyanosis] : no clubbing  or cyanosis of the fingernails [Normal Strength/Tone] : muscle strength and tone were normal [No Rash] : no rash [Cranial Nerves Intact] : cranial nerves 2-12 were intact [No Motor Deficits] : the motor exam was normal [No Gross Sensory Deficits] : no gross sensory deficits [Normal Reflexes] : deep tendon reflexes were 2+ and symmetric [Oriented x3] : oriented to person, place, and time [Normal Insight/Judgement] : insight and judgment were intact [de-identified] : Obese female seated on couch, pleasant. [de-identified] : R hip exam notable for [id number] motile mass, possibly LN though difficulty to discern from adipose deposit, which pt reports is tender. [de-identified] : Knee joint pain & swelling improved today. [de-identified] : Affect more reactive today. Mood remains poor due to health problems.

## 2021-11-19 NOTE — REASON FOR VISIT
[Follow-Up] : a follow-up visit [Pre-Visit Preparation] : pre-visit preparation was done [Intercurrent Specialty/Sub-specialty Visits] : the patient has intercurrent specialty/sub-specialty visits [FreeTextEntry2] : chart review [FreeTextEntry3] : PCP

## 2021-11-23 ENCOUNTER — NON-APPOINTMENT (OUTPATIENT)
Age: 51
End: 2021-11-23

## 2021-11-23 LAB
CRP SERPL-MCNC: 26 MG/L
ERYTHROCYTE [SEDIMENTATION RATE] IN BLOOD BY WESTERGREN METHOD: 54 MM/HR
HIV1+2 AB SPEC QL IA.RAPID: NONREACTIVE
LDH SERPL-CCNC: 209 U/L
RHEUMATOID FACT SER QL: 14 IU/ML
T PALLIDUM AB SER QL IA: NEGATIVE

## 2021-11-24 LAB
M TB IFN-G BLD-IMP: NEGATIVE
QUANTIFERON TB PLUS MITOGEN MINUS NIL: 7.78 IU/ML
QUANTIFERON TB PLUS NIL: 0.01 IU/ML
QUANTIFERON TB PLUS TB1 MINUS NIL: 0.03 IU/ML
QUANTIFERON TB PLUS TB2 MINUS NIL: 0.04 IU/ML

## 2021-11-30 ENCOUNTER — RX RENEWAL (OUTPATIENT)
Age: 51
End: 2021-11-30

## 2021-11-30 ENCOUNTER — APPOINTMENT (OUTPATIENT)
Dept: GASTROENTEROLOGY | Facility: CLINIC | Age: 51
End: 2021-11-30

## 2021-12-01 PROCEDURE — G9005: CPT

## 2021-12-10 ENCOUNTER — APPOINTMENT (OUTPATIENT)
Dept: RHEUMATOLOGY | Facility: CLINIC | Age: 51
End: 2021-12-10
Payer: MEDICARE

## 2021-12-10 VITALS
DIASTOLIC BLOOD PRESSURE: 77 MMHG | OXYGEN SATURATION: 99 % | HEIGHT: 68 IN | SYSTOLIC BLOOD PRESSURE: 147 MMHG | HEART RATE: 91 BPM | WEIGHT: 293 LBS | BODY MASS INDEX: 44.41 KG/M2

## 2021-12-10 DIAGNOSIS — Z79.899 OTHER LONG TERM (CURRENT) DRUG THERAPY: ICD-10-CM

## 2021-12-10 PROCEDURE — 99215 OFFICE O/P EST HI 40 MIN: CPT | Mod: 25

## 2021-12-10 PROCEDURE — 20610 DRAIN/INJ JOINT/BURSA W/O US: CPT | Mod: RT

## 2021-12-10 NOTE — PHYSICAL EXAM
[General Appearance - Alert] : alert [General Appearance - In No Acute Distress] : in no acute distress [Sclera] : the sclera and conjunctiva were normal [Outer Ear] : the ears and nose were normal in appearance [Oropharynx] : the oropharynx was normal [Neck Appearance] : the appearance of the neck was normal [Neck Cervical Mass (___cm)] : no neck mass was observed [Jugular Venous Distention Increased] : there was no jugular-venous distention [Thyroid Diffuse Enlargement] : the thyroid was not enlarged [Thyroid Nodule] : there were no palpable thyroid nodules [Auscultation Breath Sounds / Voice Sounds] : lungs were clear to auscultation bilaterally [Heart Rate And Rhythm] : heart rate was normal and rhythm regular [Heart Sounds] : normal S1 and S2 [Heart Sounds Gallop] : no gallops [Murmurs] : no murmurs [Heart Sounds Pericardial Friction Rub] : no pericardial rub [Edema] : there was no peripheral edema [Bowel Sounds] : normal bowel sounds [Abdomen Soft] : soft [Abdomen Tenderness] : non-tender [Abdomen Mass (___ Cm)] : no abdominal mass palpated [Cervical Lymph Nodes Enlarged Posterior Bilaterally] : posterior cervical [Cervical Lymph Nodes Enlarged Anterior Bilaterally] : anterior cervical [Supraclavicular Lymph Nodes Enlarged Bilaterally] : supraclavicular [No Spinal Tenderness] : no spinal tenderness [Skin Color & Pigmentation] : normal skin color and pigmentation [Skin Turgor] : normal skin turgor [] : no rash [No Focal Deficits] : no focal deficits [Oriented To Time, Place, And Person] : oriented to person, place, and time [Impaired Insight] : insight and judgment were intact [Affect] : the affect was normal [FreeTextEntry1] : ((+)tenderness of B/L 2nd-5h PIP's, B/L 2nd-5th MCP's and B/L wrists;  (+)tenderness over B/L 3rd and 4th flexor tendon sheaths; (+) triggering of right thumb;;  right shoulder w/ painful decreased ROM in all planes;  right knee w/ (+)tenderness, painful decreased ROM

## 2021-12-10 NOTE — HISTORY OF PRESENT ILLNESS
[Arthralgias] : arthralgias [Joint Swelling] : joint swelling [Morning Stiffness] : morning stiffness [FreeTextEntry1] : Pt reports that for the past 2 months, she has been experiencing joint pains, including her hands (MCP's and PIP's) / wrists,  elbows, shoulders, right hip, and feet/toes.  The pain is intermittent - "some days are worse than others". Pain is burning and sharp, 8/10.  (+)swelling in her hands and feet  (+)triggering of her right thumb.  (+)AM stiffness x 35 minutes.  \par (+)unintentional wt loss of 20lbs, (+)fatigue, (+)brain fog.  She also c/o lesions on her hands and face.  (+)alopecia [Anorexia] : no anorexia [Weight Loss] : no weight loss [Malaise] : no malaise [Fever] : no fever [Chills] : no chills [Fatigue] : no fatigue [Malar Facial Rash] : no malar facial rash [Skin Lesions] : no lesions [Skin Nodules] : no skin nodules [Oral Ulcers] : no oral ulcers [Cough] : no cough [Dry Mouth] : no dry mouth [Dysphonia] : no dysphonia [Dysphagia] : no dysphagia [Shortness of Breath] : no shortness of breath [Chest Pain] : no chest pain [Joint Warmth] : no joint warmth [Joint Deformity] : no joint deformity [Decreased ROM] : no decreased range of motion [Falls] : no falls

## 2021-12-10 NOTE — PROCEDURE
[Today's Date:] : Date: [unfilled] [Soft Tissue Injection] : soft tissue injection was performed [Patient] : the patient [Risks] : risks [Benefits] : benefits [Alternatives] : alternatives [Consent Obtained] : written consent was obtained prior to the procedure and is detailed in the patient's record [Therapeutic] : therapeutic [#1 Site: ______] : #1 site identified in the [unfilled] [Ethyl Chloride] : ethyl chloride [___ ml Inj] : [unfilled] ~Uml [1%] : 1%  [Without Epi] : without epinephrine [Betadine] : betadine solution [Tolerated Well] : the patient tolerated the procedure well [No Complications] : there were no complications [Instructions Given] : handouts/patient instructions were given to patient [Patient Instructed to Call] : patient was instructed to call if redness at site, a decrease in range of motion or an increase in pain is noted after procedure. [de-identified] : Kenalog 80mg

## 2021-12-10 NOTE — ASSESSMENT
[FreeTextEntry1] : 51 year old female with:\par 1)  Polyarticular arthritis:  suggestive of possible early RA, given distribution of affected joints (MCP's, PIP's, and wrists), intermittent swelling, AM stiffness, and borderline RF, elevated ESR/CRP.\par   - Re-start prednisone 10mg daily.  \par   - Pt to f/u in 2 weeks.  If symptoms improve, will plan to transition to DMARD therapy.\par   - Check labs, including hepatitis panel.\par 2)  Right hip pain:  most c/w trochanteric bursitis\par   - Injected Kenalog 80mg to right trochanteric bursa\par 3)  Trigger fingers:\par   - Rx diclofenac 75mg BID.\par   - If no improvement by next visit, pt to consider C-S injections.\par

## 2021-12-15 ENCOUNTER — NON-APPOINTMENT (OUTPATIENT)
Age: 51
End: 2021-12-15

## 2021-12-21 DIAGNOSIS — Z20.822 CONTACT WITH AND (SUSPECTED) EXPOSURE TO COVID-19: ICD-10-CM

## 2021-12-22 ENCOUNTER — APPOINTMENT (OUTPATIENT)
Dept: HOME HEALTH SERVICES | Facility: HOME HEALTH | Age: 51
End: 2021-12-22
Payer: MEDICARE

## 2021-12-22 VITALS — OXYGEN SATURATION: 99 % | HEART RATE: 74 BPM | DIASTOLIC BLOOD PRESSURE: 76 MMHG | SYSTOLIC BLOOD PRESSURE: 132 MMHG

## 2021-12-22 DIAGNOSIS — M70.61 TROCHANTERIC BURSITIS, RIGHT HIP: ICD-10-CM

## 2021-12-22 DIAGNOSIS — M65.312 TRIGGER THUMB, LEFT THUMB: ICD-10-CM

## 2021-12-22 PROCEDURE — 99349 HOME/RES VST EST MOD MDM 40: CPT

## 2021-12-22 RX ORDER — PREDNISONE 10 MG/1
10 TABLET ORAL
Qty: 28 | Refills: 0 | Status: COMPLETED | COMMUNITY
Start: 2021-05-28 | End: 2021-12-22

## 2021-12-22 NOTE — COUNSELING
[Obese -  ( BMI  >29.9 )] : obese - ( BMI  >29.9 ) [TLC diet recommended] : TLC diet recommended [Non - Smoker] : non-smoker [Smoke/CO Detectors] : smoke/CO detectors [Use assistive device to avoid falls] : use assistive device to avoid falls [] : foot exam [Completed] : Aspirin use discussion completed [Improve exercise tolerance] : improve exercise tolerance [Improve mobility] : improve mobility [Improve weight] : improve weight [Improve pain control] : improve pain control [Decrease stress] : decrease stress [Decrease hospital use] : decrease hospital use [Minimize unnecessary interventions] : minimize unnecessary interventions [Maintain functional ability] : maintain functional ability [Patient/Caregiver has ___ understanding of disease process] : patient/caregiver has [unfilled] understanding of disease process [Completed Medical Orders for Life-Sustaining Treatment] : completed medical orders for life-sustaining treatment [Full Code] : Code Status: Full Code [No Limitations] : Treatment Guidelines: No limitations [Trial of Intubation] : Intubation: Trial of Intubation [Last Verification Date: _____] : [facility] Completion/last verification date: [unfilled] [_____] : HCP: [unfilled]

## 2021-12-22 NOTE — HISTORY OF PRESENT ILLNESS
[Patient] : patient [FreeTextEntry1] [facility] [FreeTextEntry2] : COVID SCREEN:\par Patient or caretaker denies fever, cough, trouble breathing, rash, vomiting. Patient has not been in close contact with anyone who is COVID-19 positive, or suspected of having COVID-19.\par \par N95 mask, gloves, eye wear and gown (if indicated) used during visit: Yes.\par \par Total face to face time with patient is 45 min.\par \par HPI:\par 52yo F with bipolar II disorder, depression, class III obesity (41 kg/m2), IBS-D, T2DM, GERD, asthma, hidradenitis suppurativa, nocturnal hypoxemia. Seen today for routine followup visit.\par \par Social/Home Environment:\par -[employer]\par -Lives in basement of home\par -[facility] 23 hrs/week.\par \par Interval Events:\par -Interviewed with friend, cousin's son Rafael\par -Saw Dr. Ralph\par -So far 7 days prednisone 10mg with improvement of hand joint pain/swelling. \par -Diclofenac up to 75mg BID scheduled\par -Trochanteric bursitis R side s/p injection which has improved\par -Notes weight loss\par -Alopecia noted, generalized, affecting especially hairline. Normal TFTs 11/16/2021.\par -Several visits upcoming: Dr. Ralph and Dr. Romero (dermatology) 1/6/2022 (both same day), Dr. Castanon (neurology) 3/2022\par -Reviewed timing to get COVID booster, approx 2/2022-3/2022\par \par Subjective:\par 1. Appetite/Weight: Class III obesity with BMI ~42\par 2. Gait/Falls: Walks with great difficulty, knee pain improved. Manages few steps without cane.\par 3. Sleep: Improved with overnight oxygen therapy\par 4. BMs: No concerns\par 5. Urine: No concerns\par 6. Skin: No rash or ulcers.\par 7. Mood/Memory: Notes recent small memory lapses, occasional garbled speech (happens randomly), and depressed mood given her numerous medical problems & recent exacerbations. Follows with psychiatry.\par \par DME: Cane

## 2021-12-22 NOTE — PHYSICAL EXAM
[No Acute Distress] : no acute distress [Well Nourished] : well nourished [Well Developed] : well developed [Normal Voice/Communication] : normal voice communication [Normal Sclera/Conjunctiva] : normal sclera/conjunctiva [PERRL] : pupils equal, round and reactive to light [EOMI] : extra ocular movement intact [Visual Fields Grossly Intact] : visual fields grossly intact [Normal Outer Ear/Nose] : the ears and nose were normal in appearance [Normal Oropharynx] : the oropharynx was normal [Normal TMs] : both tympanic membranes were normal [Normal Nasal Mucosa] : the nasal mucosa was normal [No JVD] : no jugular venous distention [Supple] : the neck was supple [No LAD] : no lymphadenopathy [Thyroid Normal, No Nodules] : the thyroid was normal and there were no nodules present [No Respiratory Distress] : no respiratory distress [Clear to Auscultation] : lungs were clear to auscultation bilaterally [No Accessory Muscle Use] : no accessory muscle use [Normal Rate] : heart rate was normal  [Regular Rhythm] : with a regular rhythm [Normal S1, S2] : normal S1 and S2 [No Murmurs] : no murmurs heard [No Edema] : there was no peripheral edema [Normal Bowel Sounds] : normal bowel sounds [Non Tender] : non-tender [Soft] : abdomen soft [Not Distended] : not distended [Normal Post Cervical Nodes] : no posterior cervical lymphadenopathy [Normal Anterior Cervical Nodes] : no anterior cervical lymphadenopathy [No CVA Tenderness] : no ~M costovertebral angle tenderness [No Spinal Tenderness] : no spinal tenderness [No Clubbing, Cyanosis] : no clubbing  or cyanosis of the fingernails [Normal Strength/Tone] : muscle strength and tone were normal [No Rash] : no rash [Cranial Nerves Intact] : cranial nerves 2-12 were intact [No Motor Deficits] : the motor exam was normal [No Gross Sensory Deficits] : no gross sensory deficits [Normal Reflexes] : deep tendon reflexes were 2+ and symmetric [Oriented x3] : oriented to person, place, and time [Normal Insight/Judgement] : insight and judgment were intact [de-identified] : Obese female seated on couch, pleasant. [de-identified] : R hip exam notable for [id number] motile mass, possibly LN though difficulty to discern from adipose deposit, which pt reports is tender. [de-identified] : Knee joint pain & swelling improved today. [de-identified] : Affect more reactive today. Mood remains poor due to health problems.

## 2021-12-25 ENCOUNTER — TRANSCRIPTION ENCOUNTER (OUTPATIENT)
Age: 51
End: 2021-12-25

## 2021-12-30 ENCOUNTER — NON-APPOINTMENT (OUTPATIENT)
Age: 51
End: 2021-12-30

## 2021-12-30 LAB — SARS-COV-2 N GENE NPH QL NAA+PROBE: NOT DETECTED

## 2022-01-05 ENCOUNTER — APPOINTMENT (OUTPATIENT)
Dept: RHEUMATOLOGY | Facility: CLINIC | Age: 52
End: 2022-01-05

## 2022-01-06 ENCOUNTER — APPOINTMENT (OUTPATIENT)
Dept: RHEUMATOLOGY | Facility: CLINIC | Age: 52
End: 2022-01-06

## 2022-01-06 ENCOUNTER — APPOINTMENT (OUTPATIENT)
Dept: DERMATOLOGY | Facility: CLINIC | Age: 52
End: 2022-01-06

## 2022-01-10 ENCOUNTER — APPOINTMENT (OUTPATIENT)
Dept: DERMATOLOGY | Facility: CLINIC | Age: 52
End: 2022-01-10
Payer: MEDICARE

## 2022-01-10 PROCEDURE — 99214 OFFICE O/P EST MOD 30 MIN: CPT | Mod: 95

## 2022-01-10 NOTE — ASSESSMENT
[FreeTextEntry1] : HS\par Rx mupirocin to nares\par Rx BP wash, Clinda to other areas\par \par Hair loss likely multifactorial\par androgenetic and 2/2 underlying illness - undergoing w/u\par \par This visit was conducted via live synchronous audio/video telehealth with the patient who attested to being located in [facility] where the provider is licensed to practice medicine.\par

## 2022-01-10 NOTE — HISTORY OF PRESENT ILLNESS
[FreeTextEntry1] : HS [de-identified] : HS - per pt - historical\par had a bump on nose and bottom lip, underarms, thighs\par hair loss\par fatigue - gets winded easily - and has pulmonary hypertension\par gets bruises/purplish spots in areas she does not think she has trauma

## 2022-01-11 ENCOUNTER — APPOINTMENT (OUTPATIENT)
Dept: RHEUMATOLOGY | Facility: CLINIC | Age: 52
End: 2022-01-11
Payer: MEDICARE

## 2022-01-11 DIAGNOSIS — R76.8 OTHER SPECIFIED ABNORMAL IMMUNOLOGICAL FINDINGS IN SERUM: ICD-10-CM

## 2022-01-11 DIAGNOSIS — M89.49 OTHER HYPERTROPHIC OSTEOARTHROPATHY, MULTIPLE SITES: ICD-10-CM

## 2022-01-11 DIAGNOSIS — M65.30 TRIGGER FINGER, UNSPECIFIED FINGER: ICD-10-CM

## 2022-01-11 DIAGNOSIS — M19.90 UNSPECIFIED OSTEOARTHRITIS, UNSPECIFIED SITE: ICD-10-CM

## 2022-01-11 DIAGNOSIS — M25.512 PAIN IN RIGHT SHOULDER: ICD-10-CM

## 2022-01-11 DIAGNOSIS — M25.511 PAIN IN RIGHT SHOULDER: ICD-10-CM

## 2022-01-11 PROCEDURE — 99214 OFFICE O/P EST MOD 30 MIN: CPT | Mod: 95

## 2022-01-11 NOTE — ASSESSMENT
[FreeTextEntry1] : 51 year old female with:\par 1)  Polyarticular arthritis:  suggestive of possible early RA, given distribution of affected joints (MCP's, PIP's, and wrists), intermittent swelling, AM stiffness, and borderline RF, elevated ESR/CRP.  No significant improvement w/ prednisone 10mg daily x 1 week.\par   - Start Plaquenil 400mg/day.  Recommended baseline ophtho eval.\par   - Awaiting labs.\par   - Pt has received the COVID19 vaccine.\par 2)  Right hip pain:  most c/w trochanteric bursitis.  Much improved s/p corticosteroid injection at last visit.\par 3)  Trigger fingers:\par   - Cont diclofenac 75mg BID.\par   - If no improvement by next in-person visit, pt to consider C-S injections.\par

## 2022-01-11 NOTE — PHYSICAL EXAM
[General Appearance - Alert] : alert [General Appearance - In No Acute Distress] : in no acute distress [Sclera] : the sclera and conjunctiva were normal [Outer Ear] : the ears and nose were normal in appearance [Neck Appearance] : the appearance of the neck was normal [FreeTextEntry1] : No visible joint swelling;  (+)pain in hands upon flexion of fingers;  (+) triggering of right thumb;  right shoulder w/ painful decreased ROM in all planes [Skin Color & Pigmentation] : normal skin color and pigmentation [Skin Turgor] : normal skin turgor [] : no rash [Oriented To Time, Place, And Person] : oriented to person, place, and time [Impaired Insight] : insight and judgment were intact [Affect] : the affect was normal

## 2022-01-11 NOTE — HISTORY OF PRESENT ILLNESS
[Home] : at home, [unfilled] , at the time of the visit. [Medical Office: ([facility])___] : at the medical office located in  [Verbal consent obtained from patient] : the patient, [unfilled] [FreeTextEntry1] : Continues to c/o intermittent joint pains, currently worst in her hands (MCP's and PIP's) / wrists. No improvement w/ prednisone x 1 week.  Pain is burning and sharp, 8/10.  No current joint swelling.  Still w/ (+)triggering of her right thumb.  (+)AM stiffness x 15-20 minutes.  Right trochanteric bursitis much improved s/p C-S injection at last visit.  No new complaints.\par  [Anorexia] : no anorexia [Weight Loss] : no weight loss [Malaise] : no malaise [Fever] : no fever [Chills] : no chills [Fatigue] : no fatigue [Malar Facial Rash] : no malar facial rash [Skin Lesions] : no lesions [Skin Nodules] : no skin nodules [Oral Ulcers] : no oral ulcers [Cough] : no cough [Dry Mouth] : no dry mouth [Dysphonia] : no dysphonia [Dysphagia] : no dysphagia [Shortness of Breath] : no shortness of breath [Chest Pain] : no chest pain [Arthralgias] : arthralgias [Joint Swelling] : joint swelling [Joint Warmth] : no joint warmth [Joint Deformity] : no joint deformity [Decreased ROM] : no decreased range of motion [Morning Stiffness] : morning stiffness [Falls] : no falls

## 2022-01-31 ENCOUNTER — NON-APPOINTMENT (OUTPATIENT)
Age: 52
End: 2022-01-31

## 2022-03-10 ENCOUNTER — RESULT REVIEW (OUTPATIENT)
Age: 52
End: 2022-03-10

## 2022-03-10 ENCOUNTER — APPOINTMENT (OUTPATIENT)
Dept: NEUROLOGY | Facility: CLINIC | Age: 52
End: 2022-03-10
Payer: MEDICARE

## 2022-03-10 VITALS
HEART RATE: 82 BPM | BODY MASS INDEX: 44.41 KG/M2 | SYSTOLIC BLOOD PRESSURE: 150 MMHG | HEIGHT: 68 IN | DIASTOLIC BLOOD PRESSURE: 76 MMHG | WEIGHT: 293 LBS

## 2022-03-10 PROCEDURE — 99215 OFFICE O/P EST HI 40 MIN: CPT

## 2022-03-10 NOTE — CONSULT LETTER
[Dear  ___] : Dear  [unfilled], [Consult Letter:] : I had the pleasure of evaluating your patient, [unfilled]. [Please see my note below.] : Please see my note below. [Consult Closing:] : Thank you very much for allowing me to participate in the care of this patient.  If you have any questions, please do not hesitate to contact me. [Sincerely,] : Sincerely, [FreeTextEntry2] : Jefe Lassiter MD

## 2022-03-10 NOTE — HISTORY OF PRESENT ILLNESS
[FreeTextEntry1] : 51-year-old woman with history of morbid obesity, frequent headaches and pulsatile tinnitus, left greater than right, rheumatoid arthritis, pulmonary hypertension, history of left-sided sciatica and right sided intercostal neuralgia following laparoscopic cholecystectomy.  Also has history of hypertension, diabetes mellitus type 2 and irritable bowel syndrome.  Her extensive medical history reviewed in our electronic health record.\par \par She denies visual obscurations.  Currently concerned about her memory and occasional word finding difficulty.  Patient reports a sleep study was negative for obstructive sleep apnea but reported frequent oxygen desaturations.

## 2022-03-10 NOTE — PHYSICAL EXAM
[FreeTextEntry1] : Alert, oriented, friendly and cooperative.  No aphasia.  No cognitive deficits.  Visual fields are full to confrontation and fundi reveal sharp disc margins.  Pupils equal and constrict to light.  Extraocular movements intact.  No nystagmus and no diplopia.  Hearing intact to finger rub.  Neck is supple.  No bruits heard.  Heart sounds are normal.  There is no focal weakness.  Tendon reflexes are hypoactive and plantar responses are neutral.  Gait is steady.  No focal sensory loss.  BMI 57.02

## 2022-03-10 NOTE — DATA REVIEWED
[de-identified] : 5/13/2021 compatible with IIH.  MRA of brain and neck reported no significant stenosis or vascular anomalies

## 2022-03-10 NOTE — ASSESSMENT
[FreeTextEntry1] : Main concern at this time is to confirm diagnosis of idiopathic intracranial hypertension (currently without papilledema) and she will obtain spinal tap at [facility] neuroradiology division as an outpatient.  If opening pressure is elevated (greater than 25 cm, 30 cc of CSF will be removed and closing pressure documented) I will contact patient with results and institute appropriate therapy.\par \par She has failed to lose weight with diet and she is interested in bariatric surgery.  I will refer her to Dr. Samantha Kaur.\par \par Ms. Linares will return for follow-up in 3 months.

## 2022-03-17 NOTE — COUNSELING
[Obese -  ( BMI  >29.9 )] : obese - ( BMI  >29.9 ) [TLC diet recommended] : TLC diet recommended [Non - Smoker] : non-smoker [Smoke/CO Detectors] : smoke/CO detectors [Use assistive device to avoid falls] : use assistive device to avoid falls [] : foot exam [Completed] : Aspirin use discussion completed [Improve exercise tolerance] : improve exercise tolerance [Improve mobility] : improve mobility [Improve weight] : improve weight [Improve pain control] : improve pain control [Decrease stress] : decrease stress [Decrease hospital use] : decrease hospital use [Minimize unnecessary interventions] : minimize unnecessary interventions [Maintain functional ability] : maintain functional ability [Patient/Caregiver has ___ understanding of disease process] : patient/caregiver has [unfilled] understanding of disease process [Completed Medical Orders for Life-Sustaining Treatment] : completed medical orders for life-sustaining treatment [Full Code] : Code Status: Full Code [No Limitations] : Treatment Guidelines: No limitations [Trial of Intubation] : Intubation: Trial of Intubation [[facility] Date: _____] [facility]: [unfilled] [_____] : HCP: [unfilled]

## 2022-03-18 ENCOUNTER — APPOINTMENT (OUTPATIENT)
Dept: HOME HEALTH SERVICES | Facility: HOME HEALTH | Age: 52
End: 2022-03-18
Payer: MEDICARE

## 2022-03-18 VITALS — DIASTOLIC BLOOD PRESSURE: 82 MMHG | HEART RATE: 86 BPM | SYSTOLIC BLOOD PRESSURE: 124 MMHG | OXYGEN SATURATION: 98 %

## 2022-03-18 DIAGNOSIS — Z86.73 PERSONAL HISTORY OF TRANSIENT ISCHEMIC ATTACK (TIA), AND CEREBRAL INFARCTION W/OUT RESIDUAL DEFICITS: ICD-10-CM

## 2022-03-18 PROCEDURE — 99349 HOME/RES VST EST MOD MDM 40: CPT

## 2022-03-18 RX ORDER — CLINDAMYCIN HYDROCHLORIDE 300 MG/1
300 CAPSULE ORAL
Qty: 20 | Refills: 0 | Status: COMPLETED | COMMUNITY
Start: 2021-09-23 | End: 2022-03-18

## 2022-03-18 RX ORDER — OXYCODONE AND ACETAMINOPHEN 5; 325 MG/1; MG/1
5-325 TABLET ORAL EVERY 8 HOURS
Qty: 9 | Refills: 0 | Status: COMPLETED | COMMUNITY
Start: 2021-08-11 | End: 2022-03-18

## 2022-03-18 RX ORDER — PREDNISONE 10 MG/1
10 TABLET ORAL
Qty: 30 | Refills: 1 | Status: COMPLETED | COMMUNITY
Start: 2021-12-10 | End: 2022-03-18

## 2022-03-18 RX ORDER — PROMETHAZINE HYDROCHLORIDE 12.5 MG/1
12.5 TABLET ORAL
Qty: 120 | Refills: 0 | Status: COMPLETED | COMMUNITY
Start: 2021-06-16 | End: 2022-03-18

## 2022-03-18 RX ORDER — INSULIN DEGLUDEC INJECTION 100 U/ML
100 INJECTION, SOLUTION SUBCUTANEOUS
Qty: 1 | Refills: 5 | Status: COMPLETED | COMMUNITY
Start: 2020-10-27 | End: 2022-03-18

## 2022-03-18 RX ORDER — PREGABALIN 50 MG/1
50 CAPSULE ORAL TWICE DAILY
Refills: 0 | Status: COMPLETED | COMMUNITY
Start: 2020-10-27 | End: 2022-03-18

## 2022-03-30 ENCOUNTER — RX RENEWAL (OUTPATIENT)
Age: 52
End: 2022-03-30

## 2022-03-30 NOTE — ED ADULT TRIAGE NOTE - HEIGHT IN FEET
1  Primary osteoarthritis of right knee  Injection Procedure Prior Authorization    MRI knee right  wo contrast   2  Right knee pain, unspecified chronicity       Orders Placed This Encounter   Procedures    MRI knee right  wo contrast    Injection Procedure Prior Authorization        Imaging Studies (I personally reviewed images in PACS and report):    Xray of right knee 3/2/22: Mild tricompartmental osteoarthritis  IMPRESSION:  Primary osteoarthritis of right knee  Concern for medial femoral condyle insufficiency fracture    Repeat X-ray next visit: None    Return for follow up after MRI for review  Patient Instructions   Explained to Patient that she has tenderness over the medial femoral condyle  I recommend getting an MRI to rule any fracture  We have ordered Visco supplement for her arthritis  Follow-up after the MRI has been performed  Treatment for knee osteoarthritis depends on severity of cartilage wear and pain levels  First line for mild arthritis is exercise to strengthen the knee and allow better joint movement, 5-10% weight loss if overweight, and topical anti-inflammatories such as diclofenac gel or topical analgesic pain relief creams such as capsaicin  Symptoms at this stage usually improve in 3 months  Moderate to severe arthritis or arthritis not responding to first line treatments may require oral medicine such as anti-inflammatories (NSAIDs) such as ibuprofen/motrin, and if failing treatment with oral NSAIDs, then may consider depression medicines such as duloxetine (cymbalta) which also have been shown to work on pain receptors and help to control pain  Other analgesics such as tylenol (acetaminophen) may be used but do not appear to offer significant pain relief  Supplements such as glucosamine and chondroitin supplements   Some studies do show benefit from taking glucosamine sulfate (1500 mg/day) and chondroitin (800 mg/day) but evidence is controversial  I recommend against a type of glucosamine known as "glucosamine hydrochloride" which appears not as effective as glucosamine sulfate  If no improvement with oral medicines, then patients may consider steroid injection into the knee joint which provides pain relief  Steroid injections can be given every 3 months  Any sooner than that can  result in possible deterioration or cartilage in your joint  Other injections are available such as as viscosupplementation or gel shots into the knee which provide nutrients for the cartilage and can result in pain reduction  These viscosupplementation injections are generally considered every 6 months but are controversial   The [address] recommends against viscosupplementation injection; however, the [employer] [facility] recommends for these injections  Beyond these injections there are other controversial treatments including stem cell as well as platelet rich plasma injection which are out of pocket usually up to a 1000 dollars per injection  Lastly, if you fail conservative measures and have persistent pain, then we may consider referral to surgeon for knee replacement  (OUMAR Dunlap 2018)  CHIEF COMPLAINT:  Follow up right knee    HPI:  Betzy Avila is a [de-identified] y o  female  who presents for       Visit 3/31/2022 :  Patient here for follow-up for right knee  She was last examined 03/03/2022 where she underwent a cortisone injection to her right knee for osteoarthritis and chondrocalcinosis  She was started on physical therapy  Patient still reports some discomfort at her knee  She states the pain this time is at the medial knee  She has had no new injury  She states pain is worse with walking up stairs  Denies any locking of her knee  Review of Systems   Constitutional: Negative for chills and fever  HENT: Negative for ear pain and sore throat  Eyes: Negative for pain and visual disturbance     Respiratory: Negative for cough and shortness of breath  Cardiovascular: Negative for chest pain and palpitations  Gastrointestinal: Negative for abdominal pain and vomiting  Genitourinary: Negative for dysuria and hematuria  Musculoskeletal: Negative for arthralgias and back pain  Skin: Negative for color change and rash  Neurological: Negative for seizures and syncope  All other systems reviewed and are negative          Following history reviewed and update:    Past Medical History:   Diagnosis Date    Anterior tibialis tendonitis of left leg     Cervical cancer ([address] )     Shingles      Past Surgical History:   Procedure Laterality Date    BLADDER SURGERY      HALLUX VALGUS CORRECTION Right     TOTAL ABDOMINAL HYSTERECTOMY  1987    Cervical CA     Social History   Social History     Substance and Sexual Activity   Alcohol Use Yes    Alcohol/week: 1 0 standard drink    Types: 1 Glasses of wine per week    Comment: socially     Social History     Substance and Sexual Activity   Drug Use Never     Social History     Tobacco Use   Smoking Status Never Smoker   Smokeless Tobacco Never Used     Family History   Problem Relation Age of Onset    No Known Problems Mother     Heart disease Father     Heart disease Brother     Diabetes Brother     No Known Problems Son     No Known Problems Daughter     No Known Problems Daughter     Alcohol abuse Neg Hx     Substance Abuse Neg Hx     Mental illness Neg Hx     Depression Neg Hx      Allergies   Allergen Reactions    Codeine Other (See Comments) and Tachycardia    Iodine - Food Allergy Other (See Comments)    Shellfish-Derived Products - Food Allergy           Physical Exam  /80 (BP Location: Left arm, Patient Position: Sitting, Cuff Size: Adult)   Pulse 80   Ht 5' 1" (1 549 m)   Wt 81 2 kg (179 lb)   BMI 33 82 kg/m²     Constitutional:  see vital signs  Gen: well-developed, normocephalic/atraumatic, well-groomed  Eyes: No inflammation or discharge of conjunctiva or lids; sclera clear   Pharynx: no inflammation, lesion, or mass of lips  Neck: supple, no masses, non-distended  MSK: no inflammation, lesion, mass, or clubbing of nails and digits except for other than mentioned below  SKIN: no visible rashes or skin lesions  Pulmonary/Chest: Effort normal  No respiratory distress     NEURO: cranial nerves grossly intact  PSYCH:  Alert and oriented to person, place, and time; recent and remote memory intact; mood normal, no depression, anxiety, or agitation, judgment and insight good and intact     Ortho Exam    RIGHT KNEE:  Erythema: no  Swelling: no  Increased Warmth: no  Tenderness:  Medial femoral condyle  Flexion: intact  Extension: intact  Patellar Displacement:  Patellar Tilt:  Patellar Apprehension: negative  Patellar Grind Barrett's: negative  Lachman's: negative  Drawer: negative  Varus laxity: negative  Valgus laxity: negative  [facility]: negative   Thessaly Test:  Dial Charlee    Procedures 5

## 2022-04-01 ENCOUNTER — APPOINTMENT (OUTPATIENT)
Dept: BARIATRICS | Facility: CLINIC | Age: 52
End: 2022-04-01
Payer: MEDICARE

## 2022-04-01 VITALS — WEIGHT: 293 LBS | BODY MASS INDEX: 44.41 KG/M2 | HEIGHT: 68 IN

## 2022-04-01 DIAGNOSIS — K21.9 GASTRO-ESOPHAGEAL REFLUX DISEASE W/OUT ESOPHAGITIS: ICD-10-CM

## 2022-04-01 DIAGNOSIS — Z13.29 ENCOUNTER FOR SCREENING FOR OTHER SUSPECTED ENDOCRINE DISORDER: ICD-10-CM

## 2022-04-01 DIAGNOSIS — Z13.228 ENCOUNTER FOR SCREENING FOR OTHER SUSPECTED ENDOCRINE DISORDER: ICD-10-CM

## 2022-04-01 DIAGNOSIS — Z92.29 PERSONAL HISTORY OF OTHER DRUG THERAPY: ICD-10-CM

## 2022-04-01 DIAGNOSIS — Z13.0 ENCOUNTER FOR SCREENING FOR DISEASES OF THE BLOOD AND BLOOD-FORMING ORGANS AND CERTAIN DISORDERS INVOLVING THE IMMUNE MECHANISM: ICD-10-CM

## 2022-04-01 DIAGNOSIS — R63.5 ABNORMAL WEIGHT GAIN: ICD-10-CM

## 2022-04-01 DIAGNOSIS — R53.83 OTHER MALAISE: ICD-10-CM

## 2022-04-01 DIAGNOSIS — Z13.0 ENCOUNTER FOR SCREENING FOR OTHER SUSPECTED ENDOCRINE DISORDER: ICD-10-CM

## 2022-04-01 DIAGNOSIS — R53.81 OTHER MALAISE: ICD-10-CM

## 2022-04-01 DIAGNOSIS — Z13.228 ENCOUNTER FOR SCREENING FOR OTHER METABOLIC DISORDERS: ICD-10-CM

## 2022-04-01 PROCEDURE — 99215 OFFICE O/P EST HI 40 MIN: CPT | Mod: 95

## 2022-04-06 PROBLEM — Z86.73 HISTORY OF CEREBROVASCULAR ACCIDENT: Status: RESOLVED | Noted: 2021-04-28 | Resolved: 2022-04-06

## 2022-04-06 NOTE — PHYSICAL EXAM
[No Acute Distress] : no acute distress [Well Nourished] : well nourished [Well Developed] : well developed [Normal Voice/Communication] : normal voice communication [Normal Sclera/Conjunctiva] : normal sclera/conjunctiva [PERRL] : pupils equal, round and reactive to light [EOMI] : extra ocular movement intact [Visual Fields Grossly Intact] : visual fields grossly intact [Normal Outer Ear/Nose] : the ears and nose were normal in appearance [Normal Oropharynx] : the oropharynx was normal [Normal TMs] : both tympanic membranes were normal [Normal Nasal Mucosa] : the nasal mucosa was normal [No JVD] : no jugular venous distention [Supple] : the neck was supple [No LAD] : no lymphadenopathy [Thyroid Normal, No Nodules] : the thyroid was normal and there were no nodules present [No Respiratory Distress] : no respiratory distress [Clear to Auscultation] : lungs were clear to auscultation bilaterally [No Accessory Muscle Use] : no accessory muscle use [Normal Rate] : heart rate was normal  [Regular Rhythm] : with a regular rhythm [Normal S1, S2] : normal S1 and S2 [No Murmurs] : no murmurs heard [No Edema] : there was no peripheral edema [Normal Bowel Sounds] : normal bowel sounds [Non Tender] : non-tender [Soft] : abdomen soft [Not Distended] : not distended [Normal Post Cervical Nodes] : no posterior cervical lymphadenopathy [Normal Anterior Cervical Nodes] : no anterior cervical lymphadenopathy [No CVA Tenderness] : no ~M costovertebral angle tenderness [No Spinal Tenderness] : no spinal tenderness [No Clubbing, Cyanosis] : no clubbing  or cyanosis of the fingernails [Normal Strength/Tone] : muscle strength and tone were normal [No Rash] : no rash [Cranial Nerves Intact] : cranial nerves 2-12 were intact [No Motor Deficits] : the motor exam was normal [No Gross Sensory Deficits] : no gross sensory deficits [Normal Reflexes] : deep tendon reflexes were 2+ and symmetric [Oriented x3] : oriented to person, place, and time [Normal Insight/Judgement] : insight and judgment were intact [de-identified] : Obese female seated on couch, pleasant. [de-identified] : R hip exam notable for [id number] motile mass, possibly LN though difficulty to discern from adipose deposit, which pt reports is tender. [de-identified] : Knee joint pain & swelling improved today. [de-identified] : Affect more reactive today. Mood remains poor due to health problems.

## 2022-04-06 NOTE — HISTORY OF PRESENT ILLNESS
[Patient] : patient [FreeTextEntry1] : [facility] [FreeTextEntry2] : COVID SCREEN:\par Patient or caretaker denies fever, cough, trouble breathing, rash, vomiting. Patient has not been in close contact with anyone who is COVID-19 positive, or suspected of having COVID-19.\par \par N95 mask, gloves, eye wear and gown (if indicated) used during visit: Yes.\par \par Total face to face time with patient is 45 min.\par \par HPI:\par 50yo F with bipolar II disorder, depression, class III obesity (57 kg/m2), IBS-D, T2DM, GERD, asthma, hidradenitis suppurativa, nocturnal hypoxemia. Seen today for routine followup visit.\par \par Social/Home Environment:\par -[employer]\par -Lives in basement of home\par -[employer] 23 hrs/week.\par \par Interval Events:\par -Interviewed alone (with rabbit)\par -Searching for intercostal neuralgia doctor, found palliative care MD at [facility] but he only takes onc patients\par -Spinal cord stim thru pain management MD\par -Sees Dr. Maldonado 3/28\par -Got refills in interim from writer\par -Saw new neurologist Dr. Castanon, requested spinal tap to assess for pseudotumor cerebri\par -Referral to new bariatric MD at [facility], can't recall name\par -Stopped steroids through Dr. Ralph\par -5/2/2022 sees pain management for reassessment for spinal cord stim\par -Maintains that weight is not root cause of pain because it's been present at this intensity since 2016; pain control was generally achieved with opiates, and has seen pain management during that time\par -Plans to return to Dr. Ralph for hand injection; on hydroxychloroquine now. Gets locking of fingers\par -Reconciled meds\par -Intermittent continuing IBS symptoms, nausea incompletely controlled by Zofran\par -Tresiba now flexpen, corrected in meds\par -[phone] HF CM Wing\par -[phone] [facility] office\par -Dr. Islas office -- previously discussed sciatica, pt no longer following\par \par Subjective:\par 1. Appetite/Weight: Class III obesity with BMI ~42\par 2. Gait/Falls: Walks with great difficulty, knee pain improved. Manages few steps without cane.\par 3. Sleep: Improved with overnight oxygen therapy\par 4. BMs: No concerns\par 5. Urine: No concerns\par 6. Skin: No rash or ulcers.\par 7. Mood/Memory: Notes recent small memory lapses, occasional garbled speech (happens randomly), and depressed mood given her numerous medical problems & recent exacerbations. Follows with psychiatry.\par \par DME: Cane\par \par ADVANCE CARE PLANNING:\par 1. Total Time Spent: 20 min\par 2. Participants: Myself, patient\par 3. Discussion: Goals of Care, Advanced Directives, Health Care Agency, and Disease trajectory\par 4. Disease Trajectory: Discussed and reviewed that patient's health is currently stable, and the prognosis moving forward is unclear.\par 5. Prognosis, Based On Clinician Best Judgment: Indeterminate\par 6. Goals of Care: 1) Extend life, by hospitalization and aggressive measures if needed, 2) Avoid discomfort, 3) Manage medical problems at home where safely possible.\par 7. HCA: friend Tony\par 8. MOLST Completed: CPR yes, intubation and trial ventilation, do hospitalize, no limitation on medical interventions, trial feeding tube, trial IVF, use antibiotics.\par 9. Hospice Benefit discussion deferred at this time.

## 2022-04-13 ENCOUNTER — OUTPATIENT (OUTPATIENT)
Dept: OUTPATIENT SERVICES | Facility: HOSPITAL | Age: 52
LOS: 1 days | End: 2022-04-13
Payer: COMMERCIAL

## 2022-04-13 DIAGNOSIS — Z00.00 ENCOUNTER FOR GENERAL ADULT MEDICAL EXAMINATION WITHOUT ABNORMAL FINDINGS: ICD-10-CM

## 2022-04-13 DIAGNOSIS — G93.2 BENIGN INTRACRANIAL HYPERTENSION: ICD-10-CM

## 2022-04-13 DIAGNOSIS — Z90.49 ACQUIRED ABSENCE OF OTHER SPECIFIED PARTS OF DIGESTIVE TRACT: Chronic | ICD-10-CM

## 2022-04-13 DIAGNOSIS — Z98.890 OTHER SPECIFIED POSTPROCEDURAL STATES: Chronic | ICD-10-CM

## 2022-04-13 LAB
APPEARANCE CSF: CLEAR — SIGNIFICANT CHANGE UP
COLOR CSF: SIGNIFICANT CHANGE UP
GLUCOSE CSF-MCNC: 92 MG/DL — HIGH (ref 40–70)
GRAM STN FLD: SIGNIFICANT CHANGE UP
NEUTROPHILS # CSF: SIGNIFICANT CHANGE UP (ref 0–6)
NRBC NFR CSF: <1 — SIGNIFICANT CHANGE UP (ref 0–5)
PROT CSF-MCNC: 16 MG/DL — SIGNIFICANT CHANGE UP (ref 15–45)
RBC # CSF: 1 /UL — HIGH (ref 0–0)
SPECIMEN SOURCE: SIGNIFICANT CHANGE UP
TUBE TYPE: SIGNIFICANT CHANGE UP

## 2022-04-13 PROCEDURE — 84157 ASSAY OF PROTEIN OTHER: CPT

## 2022-04-13 PROCEDURE — 87205 SMEAR GRAM STAIN: CPT

## 2022-04-13 PROCEDURE — 82945 GLUCOSE OTHER FLUID: CPT

## 2022-04-13 PROCEDURE — 89051 BODY FLUID CELL COUNT: CPT

## 2022-04-13 PROCEDURE — 62328 DX LMBR SPI PNXR W/FLUOR/CT: CPT

## 2022-04-13 PROCEDURE — 87070 CULTURE OTHR SPECIMN AEROBIC: CPT

## 2022-04-16 LAB
CULTURE RESULTS: NO GROWTH — SIGNIFICANT CHANGE UP
SPECIMEN SOURCE: SIGNIFICANT CHANGE UP

## 2022-04-28 PROBLEM — Z13.0 SCREENING FOR OTHER DISORDERS OF BLOOD AND BLOOD-FORMING ORGANS: Status: ACTIVE | Noted: 2022-04-28

## 2022-04-28 PROBLEM — R63.5 WEIGHT GAIN: Status: ACTIVE | Noted: 2022-04-28

## 2022-04-28 PROBLEM — K21.9 GERD (GASTROESOPHAGEAL REFLUX DISEASE): Status: ACTIVE | Noted: 2020-10-27

## 2022-04-28 PROBLEM — R53.81 MALAISE AND FATIGUE: Status: ACTIVE | Noted: 2022-04-28

## 2022-04-28 PROBLEM — Z13.29 SCREENING FOR OTHER AND UNSPECIFIED ENDOCRINE, NUTRITIONAL, METABOLIC AND IMMUNITY DISORDERS: Status: ACTIVE | Noted: 2022-04-28

## 2022-04-28 PROBLEM — Z92.29 COVID-19 VACCINE SERIES COMPLETED: Status: ACTIVE | Noted: 2022-04-28

## 2022-04-28 PROBLEM — Z13.228 ENCOUNTER FOR SCREENING FOR OTHER METABOLIC DISORDERS: Status: ACTIVE | Noted: 2022-04-28

## 2022-04-28 NOTE — HISTORY OF PRESENT ILLNESS
[Home] : at home, [unfilled] , at the time of the visit. [Medical Office: ([facility])___] : at the medical office located in  [Verbal consent obtained from patient] : the patient, [unfilled] [de-identified] : 52 year old woman with a long-standing history of morbid obesity, who has attempted numerous weight loss treatments without long term success.  Patient had started the process 2 years ago but put things on hold due to illness.  Patient is familiar with the Laparoscopic Adjustable Gastric Band, the Laparoscopic Sleeve Gastrectomy and the Laparoscopic Gastric Bypass. Patient presents today to discuss options for surgery.

## 2022-04-28 NOTE — CONSULT LETTER
[Dear  ___] : Dear  [unfilled], [Consult Letter:] : I had the pleasure of evaluating your patient, [unfilled]. [Please see my note below.] : Please see my note below. [Consult Closing:] : Thank you very much for allowing me to participate in the care of this patient.  If you have any questions, please do not hesitate to contact me. [Sincerely,] : Sincerely, [FreeTextEntry3] : Samantha Kaur MD, FACS

## 2022-04-28 NOTE — REVIEW OF SYSTEMS
[Fatigue] : fatigue [Recent Change In Weight] : ~T recent weight change [Joint Stiffness] : joint stiffness [Negative] : Allergic/Immunologic [FreeTextEntry9] : loss of range of motion

## 2022-04-28 NOTE — ASSESSMENT
[FreeTextEntry1] : 52 year female with long-standing history of morbid obesity presents today to discuss options for weight loss surgery.  I had an extensive discussion with the patient reviewing the Laparoscopic Sleeve Gastrectomy and Laparoscopic Gastric Bypass. Diagrams were used. All questions were answered.  \par \par Complications were discussed including but not limited to: vitamin and protein deficiencies, pneumonia, dumping, urinary infection, wound infection, leaks/peritonitis possibly requiring intraabdominal drains or reoperation, bleeding, DVT, pulmonary embolus, severe reflux, sleeve obstruction, anastomotic ulcers, anastomotic strictures, abdominal wall hernias, internal hernias, revisions, death, inadequate weight loss. The importance of vitamins and protein supplementation was stressed, as was the importance of follow-up and exercise. \par \par Patient encouraged to make dietary and lifestyle changes in preparation for surgery.\par \par Patient with a long history of morbid obesity.  She is interested in the Laparoscopic Sleeve Gastrectomy. She will be given written material to review.  Pre-operative evaluations were reviewed. She will need to lose weight prior to surgery and will be seen in the office  prior to surgery. She was told to call with any questions.\par

## 2022-05-03 ENCOUNTER — RX RENEWAL (OUTPATIENT)
Age: 52
End: 2022-05-03

## 2022-05-05 ENCOUNTER — APPOINTMENT (OUTPATIENT)
Dept: BARIATRICS | Facility: CLINIC | Age: 52
End: 2022-05-05
Payer: MEDICARE

## 2022-05-05 ENCOUNTER — NON-APPOINTMENT (OUTPATIENT)
Age: 52
End: 2022-05-05

## 2022-05-05 VITALS
HEIGHT: 68 IN | WEIGHT: 293 LBS | DIASTOLIC BLOOD PRESSURE: 78 MMHG | SYSTOLIC BLOOD PRESSURE: 126 MMHG | BODY MASS INDEX: 44.41 KG/M2 | TEMPERATURE: 96.7 F | HEART RATE: 79 BPM | OXYGEN SATURATION: 98 %

## 2022-05-05 DIAGNOSIS — K21.9 GASTRO-ESOPHAGEAL REFLUX DISEASE W/OUT ESOPHAGITIS: ICD-10-CM

## 2022-05-05 PROCEDURE — 99214 OFFICE O/P EST MOD 30 MIN: CPT

## 2022-05-05 NOTE — HISTORY OF PRESENT ILLNESS
[de-identified] : 52-year-old woman with longstanding history of  morbid obesity and multiple obesity related comorbidities.  Patient presents today for an office visit after having initial telemedicine consult for weight loss surgery.  Patiently is currently in the process of undergoing preoperative evaluations.  Patient has gained weight since telemedicine visit which she feels is in part due to chronic steroid use for rheumatoid arthritis.  Patient is trying to make better food choices.  Increase fluid intake.  Activity is severely limited due to arthritis.  Reflux well controlled on pantoprazole.

## 2022-05-05 NOTE — PHYSICAL EXAM
[Obese, well nourished, in no acute distress] : obese, well nourished, in no acute distress [Normal] : affect appropriate [de-identified] : obese, soft, nontender, no evidence of hernia

## 2022-05-05 NOTE — ASSESSMENT
[FreeTextEntry1] : 52-year-old woman with longstanding history of morbid obesity currently undergoing work-up for laparoscopic sleeve gastrectomy.  Patient has gained weight since initial visit likely contributed to by chronic steroid use.  Nutrition and exercise guidelines were again reviewed with the patient.  All questions were answered.\par \par Encourage dietary changes, protein focused meals, increase fluid intake\par Increase activity as tolerated\par Continue monthly weigh ins\par Preoperative evaluations\par Will require weight loss prior to surgery\par Surgery to be performed when patient is off or only on low-dose steroids.\par Follow-up in 1 month\par \par Time spent before and after visit reviewing chart

## 2022-05-05 NOTE — REVIEW OF SYSTEMS
[Fatigue] : fatigue [Recent Change In Weight] : ~T recent weight change [Abdominal Pain] : abdominal pain [Vomiting] : vomiting [Constipation] : constipation [Reflux/Heartburn] : reflux/heartburn [Negative] : Allergic/Immunologic

## 2022-05-11 ENCOUNTER — APPOINTMENT (OUTPATIENT)
Dept: BARIATRICS/WEIGHT MGMT | Facility: CLINIC | Age: 52
End: 2022-05-11
Payer: MEDICARE

## 2022-05-11 PROCEDURE — 90791 PSYCH DIAGNOSTIC EVALUATION: CPT | Mod: 95

## 2022-05-24 ENCOUNTER — NON-APPOINTMENT (OUTPATIENT)
Age: 52
End: 2022-05-24

## 2022-06-03 ENCOUNTER — APPOINTMENT (OUTPATIENT)
Dept: BARIATRICS | Facility: CLINIC | Age: 52
End: 2022-06-03
Payer: MEDICARE

## 2022-06-03 VITALS — WEIGHT: 293 LBS | HEIGHT: 68 IN | BODY MASS INDEX: 44.41 KG/M2

## 2022-06-03 PROCEDURE — 99214 OFFICE O/P EST MOD 30 MIN: CPT | Mod: 95

## 2022-06-07 ENCOUNTER — RX RENEWAL (OUTPATIENT)
Age: 52
End: 2022-06-07

## 2022-06-13 NOTE — ASSESSMENT
[FreeTextEntry1] : 52 year old F undergoing workup for Laparoscopic Sleeve Gastrectomy. Current wt 371 lbs, weight stable since last visit. Otherwise doing well\par \par Patient re-educated on dietary and lifestyle changes in preparation for surgery\par Protein focus diet and increase zero calorie liquid intake per day \par Increase activities as tolerated and keep track of steps - goal 8-10k steps/day, continue to use step tracker\par Limit caffeine intake\par Discussed with pt need to lose weight before proceeding with surgical date\par Continue workup for planned LSG: pt will need appointments for Cards/Pulm/GI\par Followup with Nutritionist Dianna You as scheduled\par Followup with Dr. FAISAL Mireles Psychiatrist evaluation as scheduled\par \par Labs ordered - reminded pt needs bloodwork done \par All questions answered \par \par Return to office in one month to evaluate weight\par \par \par Additional time spent before and after visit reviewing chart

## 2022-06-13 NOTE — PHYSICAL EXAM
[Obese] : obese [Normal] : affect appropriate [de-identified] : Eyeglasses [de-identified] : deferred as visit is TEB

## 2022-06-13 NOTE — REASON FOR VISIT
[Follow-Up Visit] : a follow-up visit for [Other___] : [unfilled] [Home] : at home, [unfilled] , at the time of the visit. [Medical Office: ([facility])___] : at the medical office located in  [Patient] : the patient [FreeTextEntry2] : Fauzia Linares

## 2022-06-13 NOTE — HISTORY OF PRESENT ILLNESS
[de-identified] : 52 year old F undergoing workup for Laparoscopic Sleeve Gastrectomy. Current wt 371 lbs, weight stable since last visit on 5/5/2022. Pt continues to try to make good food choices, consuming 3 meals/day with protein and vegetables. Reports no snacking. Drinking zero calorie liquid 64 oz/day. Taking protonix for IBS with diarrhea but currently not having flare-ups. Reports BM once every day, without taking laxatives/stool softeners. Pt is walking, averaging 1000 steps/day though limited due to chronic regional pain syndrome. States will be trialing spinal cord stimulator by a Pain Anesthesiologist 7/8/2022. Sleeping 3-4 hr/night. No abdominal pain, reflux, nausea, vomiting, constipation or diarrhea.\par

## 2022-06-15 ENCOUNTER — NON-APPOINTMENT (OUTPATIENT)
Age: 52
End: 2022-06-15

## 2022-06-15 ENCOUNTER — EMERGENCY (EMERGENCY)
Facility: HOSPITAL | Age: 52
LOS: 1 days | Discharge: HOME | End: 2022-06-15
Attending: EMERGENCY MEDICINE
Payer: COMMERCIAL

## 2022-06-15 VITALS
WEIGHT: 293 LBS | DIASTOLIC BLOOD PRESSURE: 87 MMHG | TEMPERATURE: 97 F | RESPIRATION RATE: 18 BRPM | HEART RATE: 97 BPM | SYSTOLIC BLOOD PRESSURE: 153 MMHG | HEIGHT: 68 IN | OXYGEN SATURATION: 97 %

## 2022-06-15 DIAGNOSIS — Z98.890 OTHER SPECIFIED POSTPROCEDURAL STATES: Chronic | ICD-10-CM

## 2022-06-15 DIAGNOSIS — Z90.49 ACQUIRED ABSENCE OF OTHER SPECIFIED PARTS OF DIGESTIVE TRACT: Chronic | ICD-10-CM

## 2022-06-15 LAB
ALBUMIN SERPL ELPH-MCNC: 4.1 G/DL — SIGNIFICANT CHANGE UP (ref 3.3–5)
ALP SERPL-CCNC: 139 U/L — HIGH (ref 40–120)
ALT FLD-CCNC: 35 U/L — SIGNIFICANT CHANGE UP (ref 10–45)
ANION GAP SERPL CALC-SCNC: 17 MMOL/L — SIGNIFICANT CHANGE UP (ref 5–17)
APPEARANCE UR: CLEAR — SIGNIFICANT CHANGE UP
AST SERPL-CCNC: 61 U/L — HIGH (ref 10–40)
BASOPHILS # BLD AUTO: 0 K/UL — SIGNIFICANT CHANGE UP (ref 0–0.2)
BASOPHILS NFR BLD AUTO: 0 % — SIGNIFICANT CHANGE UP (ref 0–2)
BILIRUB SERPL-MCNC: 0.5 MG/DL — SIGNIFICANT CHANGE UP (ref 0.2–1.2)
BILIRUB UR-MCNC: NEGATIVE — SIGNIFICANT CHANGE UP
BUN SERPL-MCNC: 22 MG/DL — SIGNIFICANT CHANGE UP (ref 7–23)
CALCIUM SERPL-MCNC: 8.9 MG/DL — SIGNIFICANT CHANGE UP (ref 8.4–10.5)
CHLORIDE SERPL-SCNC: 104 MMOL/L — SIGNIFICANT CHANGE UP (ref 96–108)
CO2 SERPL-SCNC: 16 MMOL/L — LOW (ref 22–31)
COLOR SPEC: SIGNIFICANT CHANGE UP
CREAT SERPL-MCNC: 0.93 MG/DL — SIGNIFICANT CHANGE UP (ref 0.5–1.3)
DIFF PNL FLD: NEGATIVE — SIGNIFICANT CHANGE UP
EGFR: 74 ML/MIN/1.73M2 — SIGNIFICANT CHANGE UP
EOSINOPHIL # BLD AUTO: 0.15 K/UL — SIGNIFICANT CHANGE UP (ref 0–0.5)
EOSINOPHIL NFR BLD AUTO: 0.9 % — SIGNIFICANT CHANGE UP (ref 0–6)
GLUCOSE SERPL-MCNC: 252 MG/DL — HIGH (ref 70–99)
GLUCOSE UR QL: ABNORMAL
HCG SERPL-ACNC: <2 MIU/ML — SIGNIFICANT CHANGE UP
HCT VFR BLD CALC: 39.4 % — SIGNIFICANT CHANGE UP (ref 34.5–45)
HGB BLD-MCNC: 12.2 G/DL — SIGNIFICANT CHANGE UP (ref 11.5–15.5)
KETONES UR-MCNC: NEGATIVE — SIGNIFICANT CHANGE UP
LEUKOCYTE ESTERASE UR-ACNC: NEGATIVE — SIGNIFICANT CHANGE UP
LIDOCAIN IGE QN: 48 U/L — SIGNIFICANT CHANGE UP (ref 7–60)
LYMPHOCYTES # BLD AUTO: 28.7 % — SIGNIFICANT CHANGE UP (ref 13–44)
LYMPHOCYTES # BLD AUTO: 4.75 K/UL — HIGH (ref 1–3.3)
MANUAL SMEAR VERIFICATION: SIGNIFICANT CHANGE UP
MCHC RBC-ENTMCNC: 28.6 PG — SIGNIFICANT CHANGE UP (ref 27–34)
MCHC RBC-ENTMCNC: 31 GM/DL — LOW (ref 32–36)
MCV RBC AUTO: 92.3 FL — SIGNIFICANT CHANGE UP (ref 80–100)
MONOCYTES # BLD AUTO: 2.02 K/UL — HIGH (ref 0–0.9)
MONOCYTES NFR BLD AUTO: 12.2 % — SIGNIFICANT CHANGE UP (ref 2–14)
NEUTROPHILS # BLD AUTO: 9.63 K/UL — HIGH (ref 1.8–7.4)
NEUTROPHILS NFR BLD AUTO: 58.2 % — SIGNIFICANT CHANGE UP (ref 43–77)
NITRITE UR-MCNC: NEGATIVE — SIGNIFICANT CHANGE UP
PH UR: 6 — SIGNIFICANT CHANGE UP (ref 5–8)
PLAT MORPH BLD: ABNORMAL
PLATELET # BLD AUTO: 447 K/UL — HIGH (ref 150–400)
POTASSIUM SERPL-MCNC: 3.5 MMOL/L — SIGNIFICANT CHANGE UP (ref 3.5–5.3)
POTASSIUM SERPL-SCNC: 3.5 MMOL/L — SIGNIFICANT CHANGE UP (ref 3.5–5.3)
PROT SERPL-MCNC: 8 G/DL — SIGNIFICANT CHANGE UP (ref 6–8.3)
PROT UR-MCNC: NEGATIVE — SIGNIFICANT CHANGE UP
RBC # BLD: 4.27 M/UL — SIGNIFICANT CHANGE UP (ref 3.8–5.2)
RBC # FLD: 13.4 % — SIGNIFICANT CHANGE UP (ref 10.3–14.5)
RBC BLD AUTO: SIGNIFICANT CHANGE UP
SODIUM SERPL-SCNC: 137 MMOL/L — SIGNIFICANT CHANGE UP (ref 135–145)
SP GR SPEC: 1.03 — HIGH (ref 1.01–1.02)
UROBILINOGEN FLD QL: NEGATIVE — SIGNIFICANT CHANGE UP
WBC # BLD: 16.54 K/UL — HIGH (ref 3.8–10.5)
WBC # FLD AUTO: 16.54 K/UL — HIGH (ref 3.8–10.5)

## 2022-06-15 PROCEDURE — 93005 ELECTROCARDIOGRAM TRACING: CPT

## 2022-06-15 PROCEDURE — 84702 CHORIONIC GONADOTROPIN TEST: CPT

## 2022-06-15 PROCEDURE — 85025 COMPLETE CBC W/AUTO DIFF WBC: CPT

## 2022-06-15 PROCEDURE — 96374 THER/PROPH/DIAG INJ IV PUSH: CPT | Mod: XU

## 2022-06-15 PROCEDURE — 99285 EMERGENCY DEPT VISIT HI MDM: CPT | Mod: 25

## 2022-06-15 PROCEDURE — 71045 X-RAY EXAM CHEST 1 VIEW: CPT

## 2022-06-15 PROCEDURE — 96375 TX/PRO/DX INJ NEW DRUG ADDON: CPT

## 2022-06-15 PROCEDURE — 80053 COMPREHEN METABOLIC PANEL: CPT

## 2022-06-15 PROCEDURE — 87086 URINE CULTURE/COLONY COUNT: CPT

## 2022-06-15 PROCEDURE — 96376 TX/PRO/DX INJ SAME DRUG ADON: CPT

## 2022-06-15 PROCEDURE — 83690 ASSAY OF LIPASE: CPT

## 2022-06-15 PROCEDURE — 81003 URINALYSIS AUTO W/O SCOPE: CPT

## 2022-06-15 PROCEDURE — 74177 CT ABD & PELVIS W/CONTRAST: CPT | Mod: MA

## 2022-06-15 PROCEDURE — 93010 ELECTROCARDIOGRAM REPORT: CPT

## 2022-06-15 PROCEDURE — 71045 X-RAY EXAM CHEST 1 VIEW: CPT | Mod: 26

## 2022-06-15 PROCEDURE — 74177 CT ABD & PELVIS W/CONTRAST: CPT | Mod: 26,MA

## 2022-06-15 RX ORDER — SIMETHICONE 80 MG/1
80 TABLET, CHEWABLE ORAL ONCE
Refills: 0 | Status: COMPLETED | OUTPATIENT
Start: 2022-06-15 | End: 2022-06-15

## 2022-06-15 RX ORDER — METOCLOPRAMIDE HCL 10 MG
10 TABLET ORAL ONCE
Refills: 0 | Status: COMPLETED | OUTPATIENT
Start: 2022-06-15 | End: 2022-06-15

## 2022-06-15 RX ORDER — LIDOCAINE 4 G/100G
10 CREAM TOPICAL ONCE
Refills: 0 | Status: COMPLETED | OUTPATIENT
Start: 2022-06-15 | End: 2022-06-15

## 2022-06-15 RX ORDER — MORPHINE SULFATE 50 MG/1
4 CAPSULE, EXTENDED RELEASE ORAL ONCE
Refills: 0 | Status: DISCONTINUED | OUTPATIENT
Start: 2022-06-15 | End: 2022-06-15

## 2022-06-15 RX ORDER — ONDANSETRON 8 MG/1
4 TABLET, FILM COATED ORAL ONCE
Refills: 0 | Status: COMPLETED | OUTPATIENT
Start: 2022-06-15 | End: 2022-06-15

## 2022-06-15 RX ORDER — FAMOTIDINE 10 MG/ML
20 INJECTION INTRAVENOUS ONCE
Refills: 0 | Status: COMPLETED | OUTPATIENT
Start: 2022-06-15 | End: 2022-06-15

## 2022-06-15 RX ORDER — SODIUM CHLORIDE 9 MG/ML
1000 INJECTION INTRAMUSCULAR; INTRAVENOUS; SUBCUTANEOUS ONCE
Refills: 0 | Status: COMPLETED | OUTPATIENT
Start: 2022-06-15 | End: 2022-06-15

## 2022-06-15 RX ADMIN — Medication 30 MILLILITER(S): at 20:02

## 2022-06-15 RX ADMIN — LIDOCAINE 10 MILLILITER(S): 4 CREAM TOPICAL at 20:01

## 2022-06-15 RX ADMIN — FAMOTIDINE 20 MILLIGRAM(S): 10 INJECTION INTRAVENOUS at 16:09

## 2022-06-15 RX ADMIN — MORPHINE SULFATE 4 MILLIGRAM(S): 50 CAPSULE, EXTENDED RELEASE ORAL at 20:01

## 2022-06-15 RX ADMIN — MORPHINE SULFATE 4 MILLIGRAM(S): 50 CAPSULE, EXTENDED RELEASE ORAL at 16:09

## 2022-06-15 RX ADMIN — Medication 20 MILLIGRAM(S): at 20:02

## 2022-06-15 RX ADMIN — Medication 10 MILLIGRAM(S): at 16:56

## 2022-06-15 RX ADMIN — MORPHINE SULFATE 4 MILLIGRAM(S): 50 CAPSULE, EXTENDED RELEASE ORAL at 21:00

## 2022-06-15 RX ADMIN — SIMETHICONE 80 MILLIGRAM(S): 80 TABLET, CHEWABLE ORAL at 23:58

## 2022-06-15 RX ADMIN — SODIUM CHLORIDE 1000 MILLILITER(S): 9 INJECTION INTRAMUSCULAR; INTRAVENOUS; SUBCUTANEOUS at 16:33

## 2022-06-15 NOTE — ED PROVIDER NOTE - NSFOLLOWUPINSTRUCTIONS_ED_ALL_ED_FT
Imaging was done and there were no emergent findings. A prescription for Bentyl was sent to your pharmacy.       Indigestion    WHAT YOU NEED TO KNOW:    Indigestion, or dyspepsia, is stomach discomfort, feeling full quickly, or pain or burning in your esophagus or stomach. The cause may not be known.    DISCHARGE INSTRUCTIONS:    Return to the emergency department if:    You have trouble swallowing.    You have severe abdominal pain that does not go away even after you take pain medicine.    Your bowel movement is black or you vomit blood.    You have severe nausea or vomiting.    You feel a mass or lump in your abdomen.  Contact your healthcare provider if:    You have pain, discomfort, or constipation.    You have moderate nausea with vomiting and bloating.    Your skin looks pale, and you feel weaker and more tired than usual.    You have questions or concerns about your condition or care.  Medicines:    Medicines may be given to help decrease the amount of acid in your stomach.    Take your medicine as directed. Contact your healthcare provider if you think your medicine is not helping or if you have side effects. Tell him of her if you are allergic to any medicine. Keep a list of the medicines, vitamins, and herbs you take. Include the amounts, and when and why you take them. Bring the list or the pill bottles to follow-up visits. Carry your medicine list with you in case of an emergency.  Manage your symptoms:    Do not eat foods that can irritate your stomach, such as spicy or fatty foods. Do not have drinks that contain caffeine or alcohol. Chocolate, peppermint, spearmint, and citrus may also make your symptoms worse. Eat small meals several times a day instead of large meals.    Limit medicines that irritate your stomach, such as NSAIDs, steroids, or narcotics. Your healthcare provider may suggest another medicine that is less irritating. Ask your healthcare provider before you take any over-the-counter medicine.    Find ways to decrease stress. Learn new ways to relax, such as exercise, deep breathing, meditation, or listening to music.    Do not smoke. Nicotine and other chemicals in cigarettes and cigars can cause indigestion. Ask your healthcare provider for information if you currently smoke and need help to quit. E-cigarettes or smokeless tobacco still contain nicotine. Talk to your healthcare provider before you use these products.  Follow up with your healthcare provider as directed: You may be referred to a gastroenterologist. Write down your questions so you remember to ask them during your visits.

## 2022-06-15 NOTE — ED PROVIDER NOTE - ATTENDING APP SHARED VISIT CONTRIBUTION OF CARE
52F hx of CRPS, DM, HTN, IBS-D, sp tobias here with c/o ~2 hours of upper abd pain, cramping, nausea, diarrhea. States sx are c/w her hx of IBS. Last flare was ~1 year ago and was hospitalized at that time, had EGD/colonsocopy then as well. No fevers. No vomiting. No dark tarry or bloody stools. Abd pain is in waves and is crampy. No urinary sx. No recent abx or hospital stays. PE obese F uncomfortable appearing w waves of pain. RRR lung CTA BL +Bs soft epigastric, RUQ and LLQ TTP, non peritoneal. Eval for acute intra-abd pathology, ie colitis, diverticulitis, enteritis. Less likely Urinary or cardiac etiology. Less likley retained GB stone as pain not localized to RUQ, similar to prior IBS flare. Labs, UA, CTAP, meds, re-eval.

## 2022-06-15 NOTE — ED PROVIDER NOTE - NS ED ATTENDING STATEMENT MOD
This was a shared visit with the ARMANDO. I reviewed and verified the documentation and independently performed the documented:

## 2022-06-15 NOTE — ED PROVIDER NOTE - NSICDXPASTSURGICALHX_GEN_ALL_CORE_FT
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

## 2022-06-15 NOTE — ED PROVIDER NOTE - OBJECTIVE STATEMENT
52F hx of CRPS, DM, HTN, IBS-D, sp tobias presenting with severe abdominal pain and nvd x this am. patient states she woke up with severe 10/10 RUQ paint hat radiates to her lower abdomen and chest. Patient states she tried to take her IBS medication but had no relief. Patient admits to vomiting for the last two hours. and has had about 5 episodes of diarrhea. Patient states this feels like her IBS flare. Patient states morphine is usually helpful. Patient denies recent admission, recent antibiotics, sob, cough, fever, dysuria, hematuria, and ha.

## 2022-06-15 NOTE — ED ADULT NURSE REASSESSMENT NOTE - NS ED NURSE REASSESS COMMENT FT1
Received report from TERESA Escalante. Pt is A&Ox3, breathing spontaneously on RA, unlabored, and speaking in full sentences. Pt is able to move all extremities without difficulty. Reports mild pain, but decreased compared to time of arrival. Pain medication administered as ordered. Awaiting further instructions. Received report from TERESA Escalante. Pt is A&Ox3, breathing spontaneously on RA, unlabored, and speaking in full sentences. Pt is able to move all extremities without difficulty. Reports mild pain, but decreased compared to time of arrival. Pain medication administered as ordered. Passed PO challenge. Awaiting further instructions.

## 2022-06-15 NOTE — ED PROVIDER NOTE - PATIENT PORTAL LINK FT
You can access the FollowMyHealth Patient Portal offered by [facility] by registering at the following website: [email or web address]. By joining [facility]’s FollowMyHealth portal, you will also be able to view your health information using other applications (apps) compatible with our system.

## 2022-06-15 NOTE — ED PROVIDER NOTE - NSICDXPASTMEDICALHX_GEN_ALL_CORE_FT
PAST MEDICAL HISTORY:  Cardiac abnormality (pt states hx/o "cardiac event"; is unclear on diagnosis; denies hx/o MI)    Complex regional pain syndrome type 1     Diabetes     Diabetes     Essential hypertension     Former cigarette smoker (smoked x 45 years; quit ~2013)    HTN (hypertension)     IBS (irritable bowel syndrome)     Marijuana smoker, continuous (states smokes 3 - 4 times per day for pain management reasons)    Neuralgia (pt states hx/o "intercostal neuralgia")    Neuropathy     Obesity

## 2022-06-15 NOTE — ED ADULT NURSE NOTE - OBJECTIVE STATEMENT
51 y/o female PMH Crohn's, IBS and complex regional pain syndrome presents to ED via EMS reporting abdominal pain. Pt reports abdominal pain and n/v. Pt also reports diarrhea beginning today. On exam, Aox3, speaking in complete sentences. Unlabored, spontaneous respirations, NAD. Abdomen soft, tender, non-distended. Pt denies SOB and fever/chills at this time. Heplock placed, labs sent. Provider at bedside to evaluate pt.

## 2022-06-16 ENCOUNTER — APPOINTMENT (OUTPATIENT)
Dept: NEUROLOGY | Facility: CLINIC | Age: 52
End: 2022-06-16
Payer: MEDICARE

## 2022-06-16 VITALS
RESPIRATION RATE: 18 BRPM | TEMPERATURE: 98 F | SYSTOLIC BLOOD PRESSURE: 126 MMHG | DIASTOLIC BLOOD PRESSURE: 82 MMHG | OXYGEN SATURATION: 94 % | HEART RATE: 76 BPM

## 2022-06-16 LAB
CULTURE RESULTS: SIGNIFICANT CHANGE UP
SPECIMEN SOURCE: SIGNIFICANT CHANGE UP

## 2022-06-21 ENCOUNTER — APPOINTMENT (OUTPATIENT)
Dept: HOME HEALTH SERVICES | Facility: HOME HEALTH | Age: 52
End: 2022-06-21
Payer: MEDICARE

## 2022-06-21 VITALS — OXYGEN SATURATION: 97 % | DIASTOLIC BLOOD PRESSURE: 86 MMHG | SYSTOLIC BLOOD PRESSURE: 148 MMHG | HEART RATE: 62 BPM

## 2022-06-21 DIAGNOSIS — E23.6 OTHER DISORDERS OF PITUITARY GLAND: ICD-10-CM

## 2022-06-21 DIAGNOSIS — Z87.19 PERSONAL HISTORY OF OTHER DISEASES OF THE DIGESTIVE SYSTEM: ICD-10-CM

## 2022-06-21 DIAGNOSIS — M05.20 RHEUMATOID VASCULITIS WITH RHEUMATOID ARTHRITIS OF UNSPECIFIED SITE: ICD-10-CM

## 2022-06-21 PROBLEM — G90.50 COMPLEX REGIONAL PAIN SYNDROME I, UNSPECIFIED: Chronic | Status: ACTIVE | Noted: 2022-06-15

## 2022-06-21 PROCEDURE — 99348 HOME/RES VST EST LOW MDM 30: CPT

## 2022-06-21 NOTE — PHYSICAL EXAM
[No Acute Distress] : no acute distress [Well Nourished] : well nourished [Well Developed] : well developed [Normal Voice/Communication] : normal voice communication [Normal Sclera/Conjunctiva] : normal sclera/conjunctiva [PERRL] : pupils equal, round and reactive to light [EOMI] : extra ocular movement intact [Visual Fields Grossly Intact] : visual fields grossly intact [Normal Outer Ear/Nose] : the ears and nose were normal in appearance [Normal Oropharynx] : the oropharynx was normal [Normal TMs] : both tympanic membranes were normal [Normal Nasal Mucosa] : the nasal mucosa was normal [No JVD] : no jugular venous distention [Supple] : the neck was supple [No LAD] : no lymphadenopathy [Thyroid Normal, No Nodules] : the thyroid was normal and there were no nodules present [No Respiratory Distress] : no respiratory distress [Clear to Auscultation] : lungs were clear to auscultation bilaterally [No Accessory Muscle Use] : no accessory muscle use [Normal Rate] : heart rate was normal  [Regular Rhythm] : with a regular rhythm [Normal S1, S2] : normal S1 and S2 [No Murmurs] : no murmurs heard [No Edema] : there was no peripheral edema [Normal Bowel Sounds] : normal bowel sounds [Non Tender] : non-tender [Soft] : abdomen soft [Not Distended] : not distended [Normal Post Cervical Nodes] : no posterior cervical lymphadenopathy [Normal Anterior Cervical Nodes] : no anterior cervical lymphadenopathy [No CVA Tenderness] : no ~M costovertebral angle tenderness [No Spinal Tenderness] : no spinal tenderness [No Clubbing, Cyanosis] : no clubbing  or cyanosis of the fingernails [Normal Strength/Tone] : muscle strength and tone were normal [No Rash] : no rash [Cranial Nerves Intact] : cranial nerves 2-12 were intact [No Motor Deficits] : the motor exam was normal [No Gross Sensory Deficits] : no gross sensory deficits [Normal Reflexes] : deep tendon reflexes were 2+ and symmetric [Oriented x3] : oriented to person, place, and time [Normal Insight/Judgement] : insight and judgment were intact [de-identified] : Obese female seated on couch, pleasant. [de-identified] : R hip exam notable for [id number] motile mass, possibly LN though difficulty to discern from adipose deposit, which pt reports is tender. [de-identified] : Knee joint pain & swelling improved today. [de-identified] : Affect more reactive today. Mood remains poor due to health problems.

## 2022-06-21 NOTE — HISTORY OF PRESENT ILLNESS
[Patient] : patient [FreeTextEntry1] : [facility] [FreeTextEntry2] : COVID SCREEN:\par Patient or caretaker denies fever, cough, trouble breathing, rash, vomiting. Patient has not been in close contact with anyone who is COVID-19 positive, or suspected of having COVID-19.\par \par N95 mask, gloves, eye wear and gown (if indicated) used during visit: Yes.\par \par Total face to face time with patient is 30 min.\par \par HPI:\par 51yo F with bipolar II disorder, depression, class III obesity (57 kg/m2), IBS-D, T2DM, GERD, asthma, hidradenitis suppurativa, nocturnal hypoxemia. Seen today for routine followup visit.\par \par Social/Home Environment:\par -[employer]\par -Lives in basement of home\par -[employer] 23 hrs/week.\par \par Interval Events:\par -Interviewed alone (with rabbit)\par -Procedure July 8th, spinal cord stimulator placement with Dr. Deirdre Landin @ [facility]. They have to do COVID test there. Diagnosed with CRPS (complex regional pain syndrome)\par -Mannam this AM via telehealth, on percocet instead of tramadol since 5/2022\par -Dr. Ponce neurology\par -Dr. Ralph rheumatology (wants to return & repeat injections)\par -Needs GI, for endo before sleeve gastrectomy\par -Sugars "ok", 124mg/dl this AM, A1C 7.3%. Did have 252 mg/dl recently, but during vomiting/illness\par -Declines COVID booster until after sleeve gastrectomy\par -Beh therapist every Thursday\par -[employer] -- will task [facility] for 2015, writer will proffer letter\par -Refills: albuterol pump & nebulizer (Medminder)\par -Prescribed acetazolamide 500mg daily for pseudotumor cerebri, but saw it causes diarrhea & self discontinued\par \par Subjective:\par 1. Appetite/Weight: Class III obesity with BMI ~42\par 2. Gait/Falls: Walks with great difficulty, knee pain improved. Manages few steps without cane.\par 3. Sleep: Improved with overnight oxygen therapy\par 4. BMs: No concerns\par 5. Urine: No concerns\par 6. Skin: No rash or ulcers.\par 7. Mood/Memory: Notes recent small memory lapses, occasional garbled speech (happens randomly), and depressed mood given her numerous medical problems & recent exacerbations. Follows with psychiatry.\par \par DME: Cane\par \par ADVANCE CARE PLANNING:\par 1. Total Time Spent: 20 min\par 2. Participants: Myself, patient\par 3. Discussion: Goals of Care, Advanced Directives, Health Care Agency, and Disease trajectory\par 4. Disease Trajectory: Discussed and reviewed that patient's health is currently stable, and the prognosis moving forward is unclear.\par 5. Prognosis, Based On Clinician Best Judgment: Indeterminate\par 6. Goals of Care: 1) Extend life, by hospitalization and aggressive measures if needed, 2) Avoid discomfort, 3) Manage medical problems at home where safely possible.\par 7. HCA: friend Tony\par 8. MOLST Completed: CPR yes, intubation and trial ventilation, do hospitalize, no limitation on medical interventions, trial feeding tube, trial IVF, use antibiotics.\par 9. Hospice Benefit discussion deferred at this time.

## 2022-06-21 NOTE — COUNSELING
[Obese -  ( BMI  >29.9 )] : obese - ( BMI  >29.9 ) [TLC diet recommended] : TLC diet recommended [Non - Smoker] : non-smoker [Smoke/CO Detectors] : smoke/CO detectors [Use assistive device to avoid falls] : use assistive device to avoid falls [] : foot exam [Completed] : Aspirin use discussion completed [Improve exercise tolerance] : improve exercise tolerance [Improve mobility] : improve mobility [Improve weight] : improve weight [Improve pain control] : improve pain control [Decrease stress] : decrease stress [Decrease hospital use] : decrease hospital use [Minimize unnecessary interventions] : minimize unnecessary interventions [Maintain functional ability] : maintain functional ability [Patient/Caregiver has ___ understanding of disease process] : patient/caregiver has [unfilled] understanding of disease process [Completed Medical Orders for Life-Sustaining Treatment] : completed medical orders for life-sustaining treatment [Full Code] : Code Status: Full Code [No Limitations] : Treatment Guidelines: No limitations [Trial of Intubation] : Intubation: Trial of Intubation [[facility]: _____] [facility]last [facility]: [unfilled] [_____] : HCP: [unfilled]

## 2022-06-23 ENCOUNTER — APPOINTMENT (OUTPATIENT)
Dept: NEUROLOGY | Facility: CLINIC | Age: 52
End: 2022-06-23
Payer: MEDICARE

## 2022-06-23 PROCEDURE — 99213 OFFICE O/P EST LOW 20 MIN: CPT | Mod: 95

## 2022-06-23 NOTE — PHYSICAL EXAM
[FreeTextEntry1] : No cognitive or communication deficits.  No focal neurologic deficits observed on video.

## 2022-06-23 NOTE — HISTORY OF PRESENT ILLNESS
[FreeTextEntry1] : 52-year-old woman last seen 3 months ago with history of morbid obesity, frequent headaches and pulsatile tinnitus, left greater than right, rheumatoid arthritis, pulmonary hypertension, history of left-sided sciatica and right sided intercostal neuralgia following laparoscopic cholecystectomy.  Also has history of hypertension, diabetes mellitus type 2 and irritable bowel syndrome.  Her extensive medical history reviewed in our electronic health record.\par \par She denies visual obscurations.  Currently concerned about her memory and occasional word finding difficulty.  Patient reports a sleep study was negative for obstructive sleep apnea but reported frequent oxygen desaturations.\par \par She was sent for spinal tap and the opening pressure was 34 cm.  She was started 2 months ago on acetazolamide 500 mg twice daily.  She claimed that dosage aggravated her irritable bowel syndrome and caused her to have more frequent diarrhea.  She reduced the dosage to 500 mg once daily.  She also has been having increasing right sided intercostal neuralgia and her pain management doctor wants to do a trial of a spinal stimulator next month.  She has been referred to bariatric surgeon to consider feasibility of laparoscopic bariatric procedure to lose weight.

## 2022-06-23 NOTE — ASSESSMENT
[FreeTextEntry1] : She will continue acetazolamide 500 mg extended release once daily.  Again encouraged to lose weight with diet and exercise as tolerated.  She will return for office visit in 3 months.

## 2022-06-29 ENCOUNTER — APPOINTMENT (OUTPATIENT)
Dept: BARIATRICS/WEIGHT MGMT | Facility: CLINIC | Age: 52
End: 2022-06-29

## 2022-06-29 PROCEDURE — 97802 MEDICAL NUTRITION INDIV IN: CPT | Mod: 95

## 2022-06-30 ENCOUNTER — APPOINTMENT (OUTPATIENT)
Dept: GASTROENTEROLOGY | Facility: CLINIC | Age: 52
End: 2022-06-30

## 2022-06-30 PROCEDURE — 99442: CPT

## 2022-06-30 NOTE — HISTORY OF PRESENT ILLNESS
[FreeTextEntry1] : Patient was consulted telephonically for her pre bariatric surgery evaluation.  She denied nausea, vomiting, abdominal pain, change in bowel habits or rectal bleeding.  She has history of irritable bowel syndrome-D\par She also takes Imodium as needed and Zofran as needed in addition to pantoprazole for her heartburn.\par She is considering to have gastric sleeve/bypass with Dr. Kaur at [facility].  She denied sleep apnea, use of mass or any cardiac symptoms.  She was scheduled to see her cardiologist in the next weeks.\par She has history of pulmonary hypertension being managed with medications and requires oxygen at home on and off.\par She has chronic pain syndrome involving spine, right ribs for which she is under the care of pain management.

## 2022-06-30 NOTE — ASSESSMENT
[FreeTextEntry1] : Morbid obesity with BMI 56, chronic pain syndrome involving the spine and right ribs and pulmonary hypertension managed medically.  Considering bariatric procedure.  Risk benefits and sedation related issues discussed with the patient over the phone in detail

## 2022-06-30 NOTE — REASON FOR VISIT
[Home] : at home, [unfilled] , at the time of the visit. [Medical Office: ([facility])___] : at the medical office located in  [Verbal consent obtained from patient] : the patient, [unfilled] [Consultation] : a consultation visit [FreeTextEntry1] : pre bariatric surg eval

## 2022-06-30 NOTE — REVIEW OF SYSTEMS
[SOB on Exertion] : shortness of breath during exertion [Difficulty Walking] : difficulty walking [Negative] : Heme/Lymph [Dizziness] : no dizziness [Fainting] : no fainting [de-identified] : walks with cane support

## 2022-07-04 VITALS — BODY MASS INDEX: 44.41 KG/M2 | HEIGHT: 68 IN | WEIGHT: 293 LBS

## 2022-07-07 ENCOUNTER — APPOINTMENT (OUTPATIENT)
Dept: BARIATRICS | Facility: CLINIC | Age: 52
End: 2022-07-07

## 2022-07-07 VITALS — HEIGHT: 68 IN | WEIGHT: 293 LBS | BODY MASS INDEX: 44.41 KG/M2

## 2022-07-07 DIAGNOSIS — K58.0 IRRITABLE BOWEL SYNDROME WITH DIARRHEA: ICD-10-CM

## 2022-07-07 DIAGNOSIS — K58.9 IRRITABLE BOWEL SYNDROME W/OUT DIARRHEA: ICD-10-CM

## 2022-07-07 PROCEDURE — 99214 OFFICE O/P EST MOD 30 MIN: CPT | Mod: 95

## 2022-07-07 NOTE — REASON FOR VISIT
[Home] : at home, [unfilled] , at the time of the visit. [Medical Office: ([facility])___] : at the medical office located in  [Patient] : the patient [Self] : self [Follow-Up Visit] : a follow-up visit for [Other___] : [unfilled]

## 2022-07-07 NOTE — HISTORY OF PRESENT ILLNESS
[de-identified] : 52 year old F undergoing workup for Laparoscopic Sleeve Gastrectomy. Current wt 367 lbs, weight stable since last visit on 6/3/2022. Pt continues to try to make good food choices, trying to consume 3 meals/day but often less due to occasional exacerbations of IBS - recently seen [facility] ED 3 wks ago for dyspepsia. Reports no snacking. Drinking zero calorie liquid 64 oz/day. Taking protonix and Bentyl (dicyclomine) for IBS with diarrhea component but currently not having flare-ups. Reports BM once every day, without taking laxatives/stool softeners. Pt is walking more, averaging 1-2k steps/day though limited due to chronic regional pain syndrome. Scheduled tomorrow to trial spinal cord stimulator by a Pain Anesthesiologist 7/8/2022. Sleeping 3-4 hr/night. No abdominal pain, reflux, nausea, or vomiting. Pt stated had good visit with Nutritionist Dianna You 6/29/2022, had recommendations for dairy alternatives

## 2022-07-07 NOTE — ASSESSMENT
[FreeTextEntry1] : 52 year old F undergoing workup for Laparoscopic Sleeve Gastrectomy. Current wt 367 lbs, weight stable since last visit. Overall doing well.\par \par Patient re-educated on dietary and lifestyle changes in preparation for surgery\par Protein focus diet, trying to consume 3meal/day\par Maintain zero calorie liquid intake per day \par Increase activities as tolerated\par \par Labs performed 6/21/2022, results reviewed with pt - no significant abnormalities, vitamin levels normal\par Continue workup for planned weight loss surgery - completed 4 weigh-ins\par Schedule Cards evaluation for clearance to have EGD\par Scheduled Pulm evaluation for next wk\par Pt having temporary spinal cord stimulator tomorrow - 5 day trial to see if pt's symptoms of chronic regional pain syndrome resolve\par All questions answered \par \par Return to office in one month\par Followup with Nutritionist Dianna You as scheduled\par \par Additional time spent before and after visit reviewing chart

## 2022-07-09 ENCOUNTER — TRANSCRIPTION ENCOUNTER (OUTPATIENT)
Age: 52
End: 2022-07-09

## 2022-07-10 ENCOUNTER — TRANSCRIPTION ENCOUNTER (OUTPATIENT)
Age: 52
End: 2022-07-10

## 2022-07-10 ENCOUNTER — INPATIENT (INPATIENT)
Facility: HOSPITAL | Age: 52
LOS: 12 days | Discharge: NURSING, REHAB OR LONG-TERM CARE | DRG: 853 | End: 2022-07-23
Attending: HOSPITALIST | Admitting: STUDENT IN AN ORGANIZED HEALTH CARE EDUCATION/TRAINING PROGRAM
Payer: COMMERCIAL

## 2022-07-10 VITALS
DIASTOLIC BLOOD PRESSURE: 62 MMHG | TEMPERATURE: 100 F | OXYGEN SATURATION: 95 % | SYSTOLIC BLOOD PRESSURE: 118 MMHG | HEIGHT: 68 IN | HEART RATE: 78 BPM | RESPIRATION RATE: 22 BRPM

## 2022-07-10 DIAGNOSIS — Z90.49 ACQUIRED ABSENCE OF OTHER SPECIFIED PARTS OF DIGESTIVE TRACT: Chronic | ICD-10-CM

## 2022-07-10 DIAGNOSIS — Z98.890 OTHER SPECIFIED POSTPROCEDURAL STATES: Chronic | ICD-10-CM

## 2022-07-10 DIAGNOSIS — J18.9 PNEUMONIA, UNSPECIFIED ORGANISM: ICD-10-CM

## 2022-07-10 LAB
ALBUMIN SERPL ELPH-MCNC: 3.8 G/DL — SIGNIFICANT CHANGE UP (ref 3.3–5)
ALP SERPL-CCNC: 143 U/L — HIGH (ref 40–120)
ALT FLD-CCNC: 45 U/L — SIGNIFICANT CHANGE UP (ref 10–45)
ANION GAP SERPL CALC-SCNC: 14 MMOL/L — SIGNIFICANT CHANGE UP (ref 5–17)
APTT BLD: 28 SEC — SIGNIFICANT CHANGE UP (ref 27.5–35.5)
AST SERPL-CCNC: 31 U/L — SIGNIFICANT CHANGE UP (ref 10–40)
BASE EXCESS BLDV CALC-SCNC: -3.2 MMOL/L — LOW (ref -2–2)
BASOPHILS # BLD AUTO: 0.02 K/UL — SIGNIFICANT CHANGE UP (ref 0–0.2)
BASOPHILS NFR BLD AUTO: 0.2 % — SIGNIFICANT CHANGE UP (ref 0–2)
BILIRUB SERPL-MCNC: 0.6 MG/DL — SIGNIFICANT CHANGE UP (ref 0.2–1.2)
BUN SERPL-MCNC: 13 MG/DL — SIGNIFICANT CHANGE UP (ref 7–23)
CA-I SERPL-SCNC: 1.16 MMOL/L — SIGNIFICANT CHANGE UP (ref 1.15–1.33)
CALCIUM SERPL-MCNC: 8.5 MG/DL — SIGNIFICANT CHANGE UP (ref 8.4–10.5)
CHLORIDE BLDV-SCNC: 103 MMOL/L — SIGNIFICANT CHANGE UP (ref 96–108)
CHLORIDE SERPL-SCNC: 108 MMOL/L — SIGNIFICANT CHANGE UP (ref 96–108)
CO2 BLDV-SCNC: 24 MMOL/L — SIGNIFICANT CHANGE UP (ref 22–26)
CO2 SERPL-SCNC: 20 MMOL/L — LOW (ref 22–31)
CREAT SERPL-MCNC: 0.97 MG/DL — SIGNIFICANT CHANGE UP (ref 0.5–1.3)
EGFR: 70 ML/MIN/1.73M2 — SIGNIFICANT CHANGE UP
EOSINOPHIL # BLD AUTO: 0.02 K/UL — SIGNIFICANT CHANGE UP (ref 0–0.5)
EOSINOPHIL NFR BLD AUTO: 0.2 % — SIGNIFICANT CHANGE UP (ref 0–6)
FLUAV AG NPH QL: SIGNIFICANT CHANGE UP
FLUBV AG NPH QL: SIGNIFICANT CHANGE UP
GAS PNL BLDV: 138 MMOL/L — SIGNIFICANT CHANGE UP (ref 136–145)
GAS PNL BLDV: SIGNIFICANT CHANGE UP
GLUCOSE BLDC GLUCOMTR-MCNC: 130 MG/DL — HIGH (ref 70–99)
GLUCOSE BLDV-MCNC: 139 MG/DL — HIGH (ref 70–99)
GLUCOSE SERPL-MCNC: 75 MG/DL — SIGNIFICANT CHANGE UP (ref 70–99)
HCG SERPL-ACNC: <2 MIU/ML — SIGNIFICANT CHANGE UP
HCO3 BLDV-SCNC: 23 MMOL/L — SIGNIFICANT CHANGE UP (ref 22–29)
HCT VFR BLD CALC: 34.6 % — SIGNIFICANT CHANGE UP (ref 34.5–45)
HCT VFR BLDA CALC: 33 % — LOW (ref 34.5–46.5)
HGB BLD CALC-MCNC: 11 G/DL — LOW (ref 11.7–16.1)
HGB BLD-MCNC: 11 G/DL — LOW (ref 11.5–15.5)
IMM GRANULOCYTES NFR BLD AUTO: 0.3 % — SIGNIFICANT CHANGE UP (ref 0–1.5)
INR BLD: 1.21 RATIO — HIGH (ref 0.88–1.16)
LACTATE BLDV-MCNC: 1.4 MMOL/L — SIGNIFICANT CHANGE UP (ref 0.7–2)
LYMPHOCYTES # BLD AUTO: 1.6 K/UL — SIGNIFICANT CHANGE UP (ref 1–3.3)
LYMPHOCYTES # BLD AUTO: 17.9 % — SIGNIFICANT CHANGE UP (ref 13–44)
MCHC RBC-ENTMCNC: 28.8 PG — SIGNIFICANT CHANGE UP (ref 27–34)
MCHC RBC-ENTMCNC: 31.8 GM/DL — LOW (ref 32–36)
MCV RBC AUTO: 90.6 FL — SIGNIFICANT CHANGE UP (ref 80–100)
MONOCYTES # BLD AUTO: 0.41 K/UL — SIGNIFICANT CHANGE UP (ref 0–0.9)
MONOCYTES NFR BLD AUTO: 4.6 % — SIGNIFICANT CHANGE UP (ref 2–14)
NEUTROPHILS # BLD AUTO: 6.84 K/UL — SIGNIFICANT CHANGE UP (ref 1.8–7.4)
NEUTROPHILS NFR BLD AUTO: 76.8 % — SIGNIFICANT CHANGE UP (ref 43–77)
NRBC # BLD: 0 /100 WBCS — SIGNIFICANT CHANGE UP (ref 0–0)
NT-PROBNP SERPL-SCNC: 69 PG/ML — SIGNIFICANT CHANGE UP (ref 0–300)
PCO2 BLDV: 43 MMHG — HIGH (ref 39–42)
PH BLDV: 7.33 — SIGNIFICANT CHANGE UP (ref 7.32–7.43)
PLATELET # BLD AUTO: 363 K/UL — SIGNIFICANT CHANGE UP (ref 150–400)
PO2 BLDV: 34 MMHG — SIGNIFICANT CHANGE UP (ref 25–45)
POTASSIUM BLDV-SCNC: 3.9 MMOL/L — SIGNIFICANT CHANGE UP (ref 3.5–5.1)
POTASSIUM SERPL-MCNC: 3.4 MMOL/L — LOW (ref 3.5–5.3)
POTASSIUM SERPL-SCNC: 3.4 MMOL/L — LOW (ref 3.5–5.3)
PROT SERPL-MCNC: 7.8 G/DL — SIGNIFICANT CHANGE UP (ref 6–8.3)
PROTHROM AB SERPL-ACNC: 13.9 SEC — HIGH (ref 10.5–13.4)
RBC # BLD: 3.82 M/UL — SIGNIFICANT CHANGE UP (ref 3.8–5.2)
RBC # FLD: 14.2 % — SIGNIFICANT CHANGE UP (ref 10.3–14.5)
RSV RNA NPH QL NAA+NON-PROBE: SIGNIFICANT CHANGE UP
SAO2 % BLDV: 50.6 % — LOW (ref 67–88)
SARS-COV-2 RNA SPEC QL NAA+PROBE: SIGNIFICANT CHANGE UP
SODIUM SERPL-SCNC: 142 MMOL/L — SIGNIFICANT CHANGE UP (ref 135–145)
TROPONIN T, HIGH SENSITIVITY RESULT: 7 NG/L — SIGNIFICANT CHANGE UP (ref 0–51)
WBC # BLD: 8.92 K/UL — SIGNIFICANT CHANGE UP (ref 3.8–10.5)
WBC # FLD AUTO: 8.92 K/UL — SIGNIFICANT CHANGE UP (ref 3.8–10.5)

## 2022-07-10 PROCEDURE — 71275 CT ANGIOGRAPHY CHEST: CPT | Mod: 26,MA

## 2022-07-10 PROCEDURE — 93010 ELECTROCARDIOGRAM REPORT: CPT

## 2022-07-10 PROCEDURE — 99291 CRITICAL CARE FIRST HOUR: CPT | Mod: 25

## 2022-07-10 PROCEDURE — 99291 CRITICAL CARE FIRST HOUR: CPT

## 2022-07-10 PROCEDURE — 71045 X-RAY EXAM CHEST 1 VIEW: CPT | Mod: 26

## 2022-07-10 PROCEDURE — 74177 CT ABD & PELVIS W/CONTRAST: CPT | Mod: 26,MD

## 2022-07-10 RX ORDER — AZITHROMYCIN 500 MG/1
500 TABLET, FILM COATED ORAL ONCE
Refills: 0 | Status: COMPLETED | OUTPATIENT
Start: 2022-07-10 | End: 2022-07-10

## 2022-07-10 RX ORDER — DEXAMETHASONE 0.5 MG/5ML
6 ELIXIR ORAL ONCE
Refills: 0 | Status: COMPLETED | OUTPATIENT
Start: 2022-07-10 | End: 2022-07-10

## 2022-07-10 RX ORDER — MAGNESIUM SULFATE 500 MG/ML
2 VIAL (ML) INJECTION ONCE
Refills: 0 | Status: COMPLETED | OUTPATIENT
Start: 2022-07-10 | End: 2022-07-10

## 2022-07-10 RX ORDER — MORPHINE SULFATE 50 MG/1
4 CAPSULE, EXTENDED RELEASE ORAL ONCE
Refills: 0 | Status: DISCONTINUED | OUTPATIENT
Start: 2022-07-10 | End: 2022-07-10

## 2022-07-10 RX ORDER — IPRATROPIUM/ALBUTEROL SULFATE 18-103MCG
3 AEROSOL WITH ADAPTER (GRAM) INHALATION
Refills: 0 | Status: COMPLETED | OUTPATIENT
Start: 2022-07-10 | End: 2022-07-10

## 2022-07-10 RX ORDER — SODIUM CHLORIDE 9 MG/ML
1000 INJECTION INTRAMUSCULAR; INTRAVENOUS; SUBCUTANEOUS ONCE
Refills: 0 | Status: COMPLETED | OUTPATIENT
Start: 2022-07-10 | End: 2022-07-10

## 2022-07-10 RX ORDER — KETOROLAC TROMETHAMINE 30 MG/ML
15 SYRINGE (ML) INJECTION ONCE
Refills: 0 | Status: DISCONTINUED | OUTPATIENT
Start: 2022-07-10 | End: 2022-07-10

## 2022-07-10 RX ORDER — PIPERACILLIN AND TAZOBACTAM 4; .5 G/20ML; G/20ML
3.38 INJECTION, POWDER, LYOPHILIZED, FOR SOLUTION INTRAVENOUS ONCE
Refills: 0 | Status: COMPLETED | OUTPATIENT
Start: 2022-07-10 | End: 2022-07-10

## 2022-07-10 RX ORDER — ACETAMINOPHEN 500 MG
650 TABLET ORAL ONCE
Refills: 0 | Status: COMPLETED | OUTPATIENT
Start: 2022-07-10 | End: 2022-07-10

## 2022-07-10 RX ORDER — IPRATROPIUM BROMIDE 0.2 MG/ML
500 SOLUTION, NON-ORAL INHALATION
Refills: 0 | Status: COMPLETED | OUTPATIENT
Start: 2022-07-10 | End: 2022-07-10

## 2022-07-10 RX ORDER — IPRATROPIUM/ALBUTEROL SULFATE 18-103MCG
3 AEROSOL WITH ADAPTER (GRAM) INHALATION ONCE
Refills: 0 | Status: COMPLETED | OUTPATIENT
Start: 2022-07-10 | End: 2022-07-10

## 2022-07-10 RX ADMIN — Medication 650 MILLIGRAM(S): at 18:51

## 2022-07-10 RX ADMIN — Medication 3 MILLILITER(S): at 15:29

## 2022-07-10 RX ADMIN — SODIUM CHLORIDE 1000 MILLILITER(S): 9 INJECTION INTRAMUSCULAR; INTRAVENOUS; SUBCUTANEOUS at 20:09

## 2022-07-10 RX ADMIN — Medication 3 MILLILITER(S): at 14:46

## 2022-07-10 RX ADMIN — Medication 30 MILLILITER(S): at 16:03

## 2022-07-10 RX ADMIN — MORPHINE SULFATE 4 MILLIGRAM(S): 50 CAPSULE, EXTENDED RELEASE ORAL at 15:29

## 2022-07-10 RX ADMIN — Medication 500 MICROGRAM(S): at 18:39

## 2022-07-10 RX ADMIN — MORPHINE SULFATE 4 MILLIGRAM(S): 50 CAPSULE, EXTENDED RELEASE ORAL at 15:16

## 2022-07-10 RX ADMIN — Medication 15 MILLIGRAM(S): at 20:09

## 2022-07-10 RX ADMIN — PIPERACILLIN AND TAZOBACTAM 200 GRAM(S): 4; .5 INJECTION, POWDER, LYOPHILIZED, FOR SOLUTION INTRAVENOUS at 17:39

## 2022-07-10 RX ADMIN — Medication 6 MILLIGRAM(S): at 14:46

## 2022-07-10 RX ADMIN — Medication 650 MILLIGRAM(S): at 18:21

## 2022-07-10 RX ADMIN — Medication 3 MILLILITER(S): at 16:04

## 2022-07-10 RX ADMIN — AZITHROMYCIN 255 MILLIGRAM(S): 500 TABLET, FILM COATED ORAL at 15:17

## 2022-07-10 RX ADMIN — Medication 150 GRAM(S): at 14:50

## 2022-07-10 RX ADMIN — Medication 500 MICROGRAM(S): at 17:40

## 2022-07-10 RX ADMIN — Medication 15 MILLIGRAM(S): at 20:39

## 2022-07-10 RX ADMIN — Medication 2 GRAM(S): at 15:10

## 2022-07-10 NOTE — ED PROVIDER NOTE - NS ED ROS FT
GENERAL: + fever no chills  EYES: No change in vision  HEENT: No trouble swallowing or speaking  CARDIAC: + chest pain  PULMONARY: + cough + SOB  GI: + abdominal pain, no nausea or no vomiting, no diarrhea or constipation  : No changes in urination  SKIN: No rashes  NEURO: No headache, no numbness  MSK: No joint pain  Otherwise as HPI or negative.

## 2022-07-10 NOTE — ED PROVIDER NOTE - PROGRESS NOTE DETAILS
Foster Hayes MD:  Significant improvement s/p treatments Jeffrey Gonsalez MD. pt had c/o midsternal CP, had been off O2, CP now resolved. rpt EKG non ischemic. cxr noted to have multifocal pna. will provide broader coverage abx. will plan for admission pending CTA chest and CTAP.

## 2022-07-10 NOTE — ED PROVIDER NOTE - OBJECTIVE STATEMENT
52 Y F H/O CRPS, DM, HTN, IBS, S/P neurosurgical nerve stimulation insertion into back, states presenting with new SOB, difficulty breathing, in setting of recent bedrest, not ambulatory for the last week. New associated difficulty breathing, SOB, chest pain, hypoxic to mid 80s on RA by EMS. CP non-radiating worse with inspiration, + fever, denies any chills, nausea, vomiting, denies any dysuria, back pain, abdominal pain

## 2022-07-10 NOTE — H&P ADULT - NSHPSOCIALHISTORY_GEN_ALL_CORE
Social History:    Marital Status: (  ) , ( x ) Single, (  ) , (  ) , (  )   # of Children: 1 biologic, 2 adopted  Lives with: ( x ) alone, (  ) children, (  ) spouse, (  ) parents, (  ) siblings, (  ) friends, (  ) other:   Occupation:     Substance Use/Illicit Drugs: (  ) never used vs other:   Tobacco Usage: (  ) never smoked, (x  ) former smoker, (  ) current smoker and Total Pack-Years: 30 pk yrs  Last Alcohol Usage/Frequency/Amount/Withdrawal/Hx of Abuse:  none  Foreign travel:   Animal exposure:

## 2022-07-10 NOTE — H&P ADULT - NSHPLABSRESULTS_GEN_ALL_CORE
Personally reviewed old records.  Personally reviewed labs.  Personally reviewed imaging.                        11.0   8.92  )-----------( 363      ( 10 Jul 2022 14:39 )             34.6       07-10    142  |  108  |  13  ----------------------------<  75  3.4<L>   |  20<L>  |  0.97    Ca    8.5      10 Jul 2022 14:39    TPro  7.8  /  Alb  3.8  /  TBili  0.6  /  DBili  x   /  AST  31  /  ALT  45  /  AlkPhos  143<H>  07-10            LIVER FUNCTIONS - ( 10 Jul 2022 14:39 )  Alb: 3.8 g/dL / Pro: 7.8 g/dL / ALK PHOS: 143 U/L / ALT: 45 U/L / AST: 31 U/L / GGT: x             PT/INR - ( 10 Jul 2022 14:39 )   PT: 13.9 sec;   INR: 1.21 ratio         PTT - ( 10 Jul 2022 14:39 )  PTT:28.0 sec

## 2022-07-10 NOTE — H&P ADULT - PROBLEM SELECTOR PLAN 1
- 2/2 multifocal pna  - cont bipap 12/8, 70% fio2  - in respiratory distress. will get micu consult for increased work of breathing  - full code  - pt requests her daughter Thea Matta or pt's sister to make medical decisions for pt if pt unable to

## 2022-07-10 NOTE — ED ADULT NURSE REASSESSMENT NOTE - NS ED NURSE REASSESS COMMENT FT1
Pt transported upstairs with RN, EDT, and respiratory on 15L NRB. Pt vital signs stable. Safety maintained.

## 2022-07-10 NOTE — H&P ADULT - NSHPREVIEWOFSYSTEMS_GEN_ALL_CORE
REVIEW OF SYSTEMS:  CONSTITUTIONAL: No weakness. +fevers. +chills. No rigors. No weight loss.   EYES: No blurry or double vision. No eye pain.  ENT: No hearing difficulty. No vertigo. No dysphagia. No sore throat. No Sinusitis/rhinorrhea.   NECK: No pain. No stiffness/rigidity.  CARDIAC: +pleuritic chest pain. No palpitations. No lightheadedness.   RESPIRATORY: +cough. +SOB. No hemoptysis.  GASTROINTESTINAL: No abdominal pain. No nausea. No vomiting. No hematemesis. No diarrhea. No constipation.   GENITOURINARY: No dysuria. No frequency. No hesitancy.   NEUROLOGICAL: No numbness/tingling. No focal weakness. No urinary or fecal incontinence. No headache. No unsteady gait.  BACK: No back pain. No flank pain.  EXTREMITIES: +chronic lower extremity edema. Full ROM.   SKIN: No rashes. No itching. No other lesions.  PSYCHIATRIC: No depression. No anxiety. No SI/HI.  ALLERGIC: No lip swelling. No hives.  All other review of systems is negative unless indicated above.  Unless indicated above, unable to assess ROS 2/2

## 2022-07-10 NOTE — H&P ADULT - NSHPPHYSICALEXAM_GEN_ALL_CORE
PHYSICAL EXAM:   GENERAL: Alert. Not confused. +mod resp distress. Not thin. Not cachectic. +obese.  HEAD:  Atraumatic. Normocephalic.  EYES: EOMI. PERRLA. Normal conjunctiva/sclera.  ENT: Neck supple. No JVD. Moist oral mucosa. Not edentulous. No thrush.  LYMPH: Normal supraclavicular/cervical lymph nodes.   CARDIAC: +distance heart sounds. +tachy, Not maryuri. Regular rhythm. Not irregularly irregular. S1. S2.   LUNG/CHEST: CTAB. BS equal bilaterally. No wheezes. No rales. No rhonchi.  ABDOMEN: Soft. No tenderness. No distension. No fluid wave. Normal bowel sounds.  BACK: No midline/vertebral tenderness. No flank tenderness. battery pack and bloody tape in thoracic midline of spine  VASCULAR: +2 b/l radial or ulnar pulses. Palpable DP pulses.  EXTREMITIES:  No clubbing. No cyanosis. +2 b/l LE nonpitting edema. Moving all 4.  NEUROLOGY: A&Ox3. Non-focal exam. Cranial nerves intact. Normal speech. Sensation intact.  PSYCH: Normal behavior. Normal affect.  SKIN: No jaundice. No erythema. No rash/lesion.  Vascular Access:     ICU Vital Signs Last 24 Hrs  T(C): 37.6 (11 Jul 2022 00:33), Max: 38.8 (11 Jul 2022 00:05)  T(F): 99.6 (11 Jul 2022 00:33), Max: 101.9 (11 Jul 2022 00:05)  HR: 94 (11 Jul 2022 00:05) (78 - 103)  BP: 165/74 (11 Jul 2022 00:05) (105/80 - 165/74)  BP(mean): 88 (10 Jul 2022 20:51) (88 - 88)  ABP: --  ABP(mean): --  RR: 38 (11 Jul 2022 00:33) (22 - 38)  SpO2: 100% (11 Jul 2022 00:33) (86% - 100%)    O2 Parameters below as of 11 Jul 2022 00:33  Patient On (Oxygen Delivery Method): BiPAP/CPAP            I&O's Summary

## 2022-07-10 NOTE — ED CLERICAL - NS ED CLERK NOTE PRE-ARRIVAL INFORMATION; ADDITIONAL PRE-ARRIVAL INFORMATION

## 2022-07-10 NOTE — H&P ADULT - HISTORY OF PRESENT ILLNESS
52F c hx CRPS, MO, DM2, HTN, HTN, IBS, chronic back pain, pseudotumor cerebri, recent spinal nerve stimulator placed ([facility]), pw 2 days fevers, SOB, productive cough.    Pt reports having the neurostimulator placed at [facility] as an outpt procedure under conscious sedation, was observed, discharged home. That night at home, pt started to fever, SOB, productive cough. Pt states her symptoms have progressively gotten worse since then. Pt states she is having orthopnea and also can't lay flat 2/2 recent spinal stimulator. Reports b/l pleuritic rib pain. Denies urinary symptoms, diarrhea, abd pain. Pt ambulates with walker at baseline.

## 2022-07-10 NOTE — ED ADULT NURSE NOTE - OBJECTIVE STATEMENT
52 year old female presents to ED via EMS from home complaining of SOB x2 days. PMH spinal cord stimulator placement on Friday, pulmonary hypertension and asthma. States that at 1am on Friday night started having shortness of breath, unresolved since today. Upon assessment pt A&O x4, ambulatory. Breathing labored and tachypneic, oxygen saturation 95% on non rebreather. Patient in tripod position, talking in 3-4 work sentences. On bedside cardiac monitor, EKG completed. VS as documented. Bed locked and lowered. Comfort and safety measures maintained.

## 2022-07-10 NOTE — ED ADULT NURSE NOTE - TEMPLATE
[Eczema] : eczema [No Acute Changes] : No acute changes since previous visit [Nl] : Neurological [Change in Activity] : no change in activity [Fever Above 102] : no fever [Malaise] : no malaise [Redness] : no redness [Nasal Stuffiness] : no nasal congestion [Wheezing] : no wheezing [Cough] : no cough [Asthma] : no asthma [Vomiting] : no vomiting [Diarrhea] : no diarrhea [Constipation] : no constipation [Limping] : no limping [Joint Pains] : no arthralgias [Joint Swelling] : no joint swelling [Sleep Disturbances] : ~T no sleep disturbances [Diabetes] : no diabetese [Bruising] : no tendency for easy bruising [Swollen Glands] : no lymphadenopathy [Frequent Infections] : no frequent infections Respiratory

## 2022-07-10 NOTE — H&P ADULT - PROBLEM SELECTOR PLAN 7
- f/u surgeon who placed device as outpt  - pt states it is working as she doesn't have back pain  - avoid lying flat on back  - will try to avoid changing dressing

## 2022-07-10 NOTE — ED PROVIDER NOTE - CLINICAL SUMMARY MEDICAL DECISION MAKING FREE TEXT BOX
52yr F hx of CPRS, DM HTN IBS, recent nerve stimulator implamantion over thoracic spine p/w SOB MCCALL and chest discomfort in s/o 1 wk bed rest and feeling of fever and chills. denies being on AC. denies hx of VTE.   exam notable for ill appearing, 1-2 word sentences, mod resp distress, S1-2, tachy, wheezing diffusely, RLQ ttp (reports since friday had abd pain). no peripheral edema.   immediately started treating with IV steroids, azithro (QTc checked), duonebs, hypoxic to 80s%, improved with NRB.   reevaluated at 30mins, noted to significantly improved, however scattered wheezes, will repeat. plan for CT c/a/p, rule out abd etiology as well as pna PE.   will likely need admission. signed out pending results.

## 2022-07-10 NOTE — H&P ADULT - ASSESSMENT
52F c hx CRPS, MO, DM2, HTN, HTN, IBS, chronic back pain, pseudotumor cerebri, recent spinal nerve stimulator placed (7/8/22 @ [facility]), pw acute hypoxic resp failure, respiratory distress, and sepsis 2/2 multifocal and likely aspiration pneumonia

## 2022-07-11 ENCOUNTER — NON-APPOINTMENT (OUTPATIENT)
Age: 52
End: 2022-07-11

## 2022-07-11 DIAGNOSIS — A41.9 SEPSIS, UNSPECIFIED ORGANISM: ICD-10-CM

## 2022-07-11 DIAGNOSIS — E11.9 TYPE 2 DIABETES MELLITUS WITHOUT COMPLICATIONS: ICD-10-CM

## 2022-07-11 DIAGNOSIS — I10 ESSENTIAL (PRIMARY) HYPERTENSION: ICD-10-CM

## 2022-07-11 DIAGNOSIS — J96.01 ACUTE RESPIRATORY FAILURE WITH HYPOXIA: ICD-10-CM

## 2022-07-11 DIAGNOSIS — J69.0 PNEUMONITIS DUE TO INHALATION OF FOOD AND VOMIT: ICD-10-CM

## 2022-07-11 DIAGNOSIS — Z96.89 PRESENCE OF OTHER SPECIFIED FUNCTIONAL IMPLANTS: ICD-10-CM

## 2022-07-11 DIAGNOSIS — K58.9 IRRITABLE BOWEL SYNDROME WITHOUT DIARRHEA: ICD-10-CM

## 2022-07-11 LAB
ALBUMIN SERPL ELPH-MCNC: 3.1 G/DL — LOW (ref 3.3–5)
ALBUMIN SERPL ELPH-MCNC: 3.6 G/DL — SIGNIFICANT CHANGE UP (ref 3.3–5)
ALP SERPL-CCNC: 142 U/L — HIGH (ref 40–120)
ALP SERPL-CCNC: 145 U/L — HIGH (ref 40–120)
ALT FLD-CCNC: 40 U/L — SIGNIFICANT CHANGE UP (ref 10–45)
ALT FLD-CCNC: 41 U/L — SIGNIFICANT CHANGE UP (ref 10–45)
ANION GAP SERPL CALC-SCNC: 12 MMOL/L — SIGNIFICANT CHANGE UP (ref 5–17)
ANION GAP SERPL CALC-SCNC: 17 MMOL/L — SIGNIFICANT CHANGE UP (ref 5–17)
AST SERPL-CCNC: 40 U/L — SIGNIFICANT CHANGE UP (ref 10–40)
AST SERPL-CCNC: 51 U/L — HIGH (ref 10–40)
BASE EXCESS BLDV CALC-SCNC: -3 MMOL/L — LOW (ref -2–2)
BASE EXCESS BLDV CALC-SCNC: -3.4 MMOL/L — LOW (ref -2–2)
BASE EXCESS BLDV CALC-SCNC: -5.1 MMOL/L — LOW (ref -2–2)
BASOPHILS # BLD AUTO: 0.02 K/UL — SIGNIFICANT CHANGE UP (ref 0–0.2)
BASOPHILS NFR BLD AUTO: 0.2 % — SIGNIFICANT CHANGE UP (ref 0–2)
BILIRUB SERPL-MCNC: 0.6 MG/DL — SIGNIFICANT CHANGE UP (ref 0.2–1.2)
BILIRUB SERPL-MCNC: 1 MG/DL — SIGNIFICANT CHANGE UP (ref 0.2–1.2)
BUN SERPL-MCNC: 16 MG/DL — SIGNIFICANT CHANGE UP (ref 7–23)
BUN SERPL-MCNC: 18 MG/DL — SIGNIFICANT CHANGE UP (ref 7–23)
CA-I SERPL-SCNC: 1.15 MMOL/L — SIGNIFICANT CHANGE UP (ref 1.15–1.33)
CA-I SERPL-SCNC: 1.15 MMOL/L — SIGNIFICANT CHANGE UP (ref 1.15–1.33)
CALCIUM SERPL-MCNC: 8.2 MG/DL — LOW (ref 8.4–10.5)
CALCIUM SERPL-MCNC: 8.4 MG/DL — SIGNIFICANT CHANGE UP (ref 8.4–10.5)
CHLORIDE BLDV-SCNC: 107 MMOL/L — SIGNIFICANT CHANGE UP (ref 96–108)
CHLORIDE BLDV-SCNC: 108 MMOL/L — SIGNIFICANT CHANGE UP (ref 96–108)
CHLORIDE SERPL-SCNC: 106 MMOL/L — SIGNIFICANT CHANGE UP (ref 96–108)
CHLORIDE SERPL-SCNC: 110 MMOL/L — HIGH (ref 96–108)
CO2 BLDV-SCNC: 22 MMOL/L — SIGNIFICANT CHANGE UP (ref 22–26)
CO2 BLDV-SCNC: 23 MMOL/L — SIGNIFICANT CHANGE UP (ref 22–26)
CO2 BLDV-SCNC: 24 MMOL/L — SIGNIFICANT CHANGE UP (ref 22–26)
CO2 SERPL-SCNC: 16 MMOL/L — LOW (ref 22–31)
CO2 SERPL-SCNC: 21 MMOL/L — LOW (ref 22–31)
CREAT SERPL-MCNC: 1.04 MG/DL — SIGNIFICANT CHANGE UP (ref 0.5–1.3)
CREAT SERPL-MCNC: 1.09 MG/DL — SIGNIFICANT CHANGE UP (ref 0.5–1.3)
EGFR: 61 ML/MIN/1.73M2 — SIGNIFICANT CHANGE UP
EGFR: 65 ML/MIN/1.73M2 — SIGNIFICANT CHANGE UP
EOSINOPHIL # BLD AUTO: 0 K/UL — SIGNIFICANT CHANGE UP (ref 0–0.5)
EOSINOPHIL NFR BLD AUTO: 0 % — SIGNIFICANT CHANGE UP (ref 0–6)
GAS PNL BLDA: SIGNIFICANT CHANGE UP
GAS PNL BLDV: 136 MMOL/L — SIGNIFICANT CHANGE UP (ref 136–145)
GAS PNL BLDV: 137 MMOL/L — SIGNIFICANT CHANGE UP (ref 136–145)
GAS PNL BLDV: SIGNIFICANT CHANGE UP
GLUCOSE BLDC GLUCOMTR-MCNC: 101 MG/DL — HIGH (ref 70–99)
GLUCOSE BLDC GLUCOMTR-MCNC: 111 MG/DL — HIGH (ref 70–99)
GLUCOSE BLDC GLUCOMTR-MCNC: 111 MG/DL — HIGH (ref 70–99)
GLUCOSE BLDC GLUCOMTR-MCNC: 113 MG/DL — HIGH (ref 70–99)
GLUCOSE BLDV-MCNC: 117 MG/DL — HIGH (ref 70–99)
GLUCOSE BLDV-MCNC: 151 MG/DL — HIGH (ref 70–99)
GLUCOSE SERPL-MCNC: 116 MG/DL — HIGH (ref 70–99)
GLUCOSE SERPL-MCNC: 90 MG/DL — SIGNIFICANT CHANGE UP (ref 70–99)
HCO3 BLDV-SCNC: 21 MMOL/L — LOW (ref 22–29)
HCO3 BLDV-SCNC: 22 MMOL/L — SIGNIFICANT CHANGE UP (ref 22–29)
HCO3 BLDV-SCNC: 22 MMOL/L — SIGNIFICANT CHANGE UP (ref 22–29)
HCT VFR BLD CALC: 32 % — LOW (ref 34.5–45)
HCT VFR BLD CALC: 35.8 % — SIGNIFICANT CHANGE UP (ref 34.5–45)
HCT VFR BLDA CALC: 32 % — LOW (ref 34.5–46.5)
HCT VFR BLDA CALC: 34 % — LOW (ref 34.5–46.5)
HGB BLD CALC-MCNC: 10.7 G/DL — LOW (ref 11.7–16.1)
HGB BLD CALC-MCNC: 11.2 G/DL — LOW (ref 11.7–16.1)
HGB BLD-MCNC: 10.2 G/DL — LOW (ref 11.5–15.5)
HGB BLD-MCNC: 10.8 G/DL — LOW (ref 11.5–15.5)
HOROWITZ INDEX BLDV+IHG-RTO: 100 — SIGNIFICANT CHANGE UP
IMM GRANULOCYTES NFR BLD AUTO: 0.5 % — SIGNIFICANT CHANGE UP (ref 0–1.5)
LACTATE BLDV-MCNC: 0.9 MMOL/L — SIGNIFICANT CHANGE UP (ref 0.7–2)
LACTATE BLDV-MCNC: 1.4 MMOL/L — SIGNIFICANT CHANGE UP (ref 0.7–2)
LYMPHOCYTES # BLD AUTO: 1.62 K/UL — SIGNIFICANT CHANGE UP (ref 1–3.3)
LYMPHOCYTES # BLD AUTO: 15.9 % — SIGNIFICANT CHANGE UP (ref 13–44)
MAGNESIUM SERPL-MCNC: 2 MG/DL — SIGNIFICANT CHANGE UP (ref 1.6–2.6)
MAGNESIUM SERPL-MCNC: 2.1 MG/DL — SIGNIFICANT CHANGE UP (ref 1.6–2.6)
MCHC RBC-ENTMCNC: 28.7 PG — SIGNIFICANT CHANGE UP (ref 27–34)
MCHC RBC-ENTMCNC: 29 PG — SIGNIFICANT CHANGE UP (ref 27–34)
MCHC RBC-ENTMCNC: 30.2 GM/DL — LOW (ref 32–36)
MCHC RBC-ENTMCNC: 31.9 GM/DL — LOW (ref 32–36)
MCV RBC AUTO: 90.9 FL — SIGNIFICANT CHANGE UP (ref 80–100)
MCV RBC AUTO: 95.2 FL — SIGNIFICANT CHANGE UP (ref 80–100)
MONOCYTES # BLD AUTO: 0.32 K/UL — SIGNIFICANT CHANGE UP (ref 0–0.9)
MONOCYTES NFR BLD AUTO: 3.1 % — SIGNIFICANT CHANGE UP (ref 2–14)
NEUTROPHILS # BLD AUTO: 8.2 K/UL — HIGH (ref 1.8–7.4)
NEUTROPHILS NFR BLD AUTO: 80.3 % — HIGH (ref 43–77)
NRBC # BLD: 0 /100 WBCS — SIGNIFICANT CHANGE UP (ref 0–0)
NRBC # BLD: 0 /100 WBCS — SIGNIFICANT CHANGE UP (ref 0–0)
PCO2 BLDV: 37 MMHG — LOW (ref 39–42)
PCO2 BLDV: 38 MMHG — LOW (ref 39–42)
PCO2 BLDV: 49 MMHG — HIGH (ref 39–42)
PH BLDV: 7.26 — LOW (ref 7.32–7.43)
PH BLDV: 7.37 — SIGNIFICANT CHANGE UP (ref 7.32–7.43)
PH BLDV: 7.37 — SIGNIFICANT CHANGE UP (ref 7.32–7.43)
PHOSPHATE SERPL-MCNC: 2.5 MG/DL — SIGNIFICANT CHANGE UP (ref 2.5–4.5)
PHOSPHATE SERPL-MCNC: 3.1 MG/DL — SIGNIFICANT CHANGE UP (ref 2.5–4.5)
PLATELET # BLD AUTO: 327 K/UL — SIGNIFICANT CHANGE UP (ref 150–400)
PLATELET # BLD AUTO: 351 K/UL — SIGNIFICANT CHANGE UP (ref 150–400)
PO2 BLDV: 45 MMHG — SIGNIFICANT CHANGE UP (ref 25–45)
PO2 BLDV: 49 MMHG — HIGH (ref 25–45)
PO2 BLDV: 65 MMHG — HIGH (ref 25–45)
POTASSIUM BLDV-SCNC: 3.9 MMOL/L — SIGNIFICANT CHANGE UP (ref 3.5–5.1)
POTASSIUM BLDV-SCNC: 3.9 MMOL/L — SIGNIFICANT CHANGE UP (ref 3.5–5.1)
POTASSIUM SERPL-MCNC: 3.8 MMOL/L — SIGNIFICANT CHANGE UP (ref 3.5–5.3)
POTASSIUM SERPL-MCNC: 4.5 MMOL/L — SIGNIFICANT CHANGE UP (ref 3.5–5.3)
POTASSIUM SERPL-SCNC: 3.8 MMOL/L — SIGNIFICANT CHANGE UP (ref 3.5–5.3)
POTASSIUM SERPL-SCNC: 4.5 MMOL/L — SIGNIFICANT CHANGE UP (ref 3.5–5.3)
PROT SERPL-MCNC: 7.2 G/DL — SIGNIFICANT CHANGE UP (ref 6–8.3)
PROT SERPL-MCNC: 7.5 G/DL — SIGNIFICANT CHANGE UP (ref 6–8.3)
RBC # BLD: 3.52 M/UL — LOW (ref 3.8–5.2)
RBC # BLD: 3.76 M/UL — LOW (ref 3.8–5.2)
RBC # FLD: 14.4 % — SIGNIFICANT CHANGE UP (ref 10.3–14.5)
RBC # FLD: 14.6 % — HIGH (ref 10.3–14.5)
SAO2 % BLDV: 67.9 % — SIGNIFICANT CHANGE UP (ref 67–88)
SAO2 % BLDV: 79.6 % — SIGNIFICANT CHANGE UP (ref 67–88)
SAO2 % BLDV: 92.3 % — HIGH (ref 67–88)
SODIUM SERPL-SCNC: 139 MMOL/L — SIGNIFICANT CHANGE UP (ref 135–145)
SODIUM SERPL-SCNC: 143 MMOL/L — SIGNIFICANT CHANGE UP (ref 135–145)
WBC # BLD: 10.21 K/UL — SIGNIFICANT CHANGE UP (ref 3.8–10.5)
WBC # BLD: 11.21 K/UL — HIGH (ref 3.8–10.5)
WBC # FLD AUTO: 10.21 K/UL — SIGNIFICANT CHANGE UP (ref 3.8–10.5)
WBC # FLD AUTO: 11.21 K/UL — HIGH (ref 3.8–10.5)

## 2022-07-11 PROCEDURE — 99233 SBSQ HOSP IP/OBS HIGH 50: CPT | Mod: GC

## 2022-07-11 PROCEDURE — 31500 INSERT EMERGENCY AIRWAY: CPT | Mod: GC

## 2022-07-11 PROCEDURE — 36620 INSERTION CATHETER ARTERY: CPT | Mod: GC

## 2022-07-11 PROCEDURE — 99291 CRITICAL CARE FIRST HOUR: CPT | Mod: GC,25

## 2022-07-11 PROCEDURE — 36556 INSERT NON-TUNNEL CV CATH: CPT | Mod: GC

## 2022-07-11 PROCEDURE — 99222 1ST HOSP IP/OBS MODERATE 55: CPT

## 2022-07-11 PROCEDURE — 71045 X-RAY EXAM CHEST 1 VIEW: CPT | Mod: 26

## 2022-07-11 RX ORDER — PROPOFOL 10 MG/ML
50 INJECTION, EMULSION INTRAVENOUS ONCE
Refills: 0 | Status: COMPLETED | OUTPATIENT
Start: 2022-07-11 | End: 2022-07-11

## 2022-07-11 RX ORDER — VANCOMYCIN HCL 1 G
1000 VIAL (EA) INTRAVENOUS EVERY 12 HOURS
Refills: 0 | Status: DISCONTINUED | OUTPATIENT
Start: 2022-07-11 | End: 2022-07-11

## 2022-07-11 RX ORDER — LOPERAMIDE HCL 2 MG
2 TABLET ORAL EVERY 4 HOURS
Refills: 0 | Status: DISCONTINUED | OUTPATIENT
Start: 2022-07-11 | End: 2022-07-11

## 2022-07-11 RX ORDER — LIRAGLUTIDE 6 MG/ML
0 INJECTION SUBCUTANEOUS
Qty: 0 | Refills: 0 | DISCHARGE

## 2022-07-11 RX ORDER — PIPERACILLIN AND TAZOBACTAM 4; .5 G/20ML; G/20ML
3.38 INJECTION, POWDER, LYOPHILIZED, FOR SOLUTION INTRAVENOUS EVERY 8 HOURS
Refills: 0 | Status: DISCONTINUED | OUTPATIENT
Start: 2022-07-11 | End: 2022-07-11

## 2022-07-11 RX ORDER — POTASSIUM CHLORIDE 20 MEQ
10 PACKET (EA) ORAL
Refills: 0 | Status: COMPLETED | OUTPATIENT
Start: 2022-07-11 | End: 2022-07-11

## 2022-07-11 RX ORDER — SODIUM CHLORIDE 9 MG/ML
1000 INJECTION, SOLUTION INTRAVENOUS
Refills: 0 | Status: DISCONTINUED | OUTPATIENT
Start: 2022-07-11 | End: 2022-07-11

## 2022-07-11 RX ORDER — DEXTROSE 50 % IN WATER 50 %
25 SYRINGE (ML) INTRAVENOUS ONCE
Refills: 0 | Status: DISCONTINUED | OUTPATIENT
Start: 2022-07-11 | End: 2022-07-11

## 2022-07-11 RX ORDER — CHLORHEXIDINE GLUCONATE 213 G/1000ML
1 SOLUTION TOPICAL
Refills: 0 | Status: DISCONTINUED | OUTPATIENT
Start: 2022-07-11 | End: 2022-07-23

## 2022-07-11 RX ORDER — PANTOPRAZOLE SODIUM 20 MG/1
40 TABLET, DELAYED RELEASE ORAL
Refills: 0 | Status: DISCONTINUED | OUTPATIENT
Start: 2022-07-11 | End: 2022-07-11

## 2022-07-11 RX ORDER — FENTANYL CITRATE 50 UG/ML
50 INJECTION INTRAVENOUS ONCE
Refills: 0 | Status: DISCONTINUED | OUTPATIENT
Start: 2022-07-11 | End: 2022-07-11

## 2022-07-11 RX ORDER — INSULIN LISPRO 100/ML
VIAL (ML) SUBCUTANEOUS EVERY 6 HOURS
Refills: 0 | Status: DISCONTINUED | OUTPATIENT
Start: 2022-07-11 | End: 2022-07-23

## 2022-07-11 RX ORDER — PIPERACILLIN AND TAZOBACTAM 4; .5 G/20ML; G/20ML
3.38 INJECTION, POWDER, LYOPHILIZED, FOR SOLUTION INTRAVENOUS ONCE
Refills: 0 | Status: COMPLETED | OUTPATIENT
Start: 2022-07-11 | End: 2022-07-11

## 2022-07-11 RX ORDER — ACETAMINOPHEN 500 MG
650 TABLET ORAL EVERY 6 HOURS
Refills: 0 | Status: DISCONTINUED | OUTPATIENT
Start: 2022-07-11 | End: 2022-07-11

## 2022-07-11 RX ORDER — VANCOMYCIN HCL 1 G
1000 VIAL (EA) INTRAVENOUS EVERY 12 HOURS
Refills: 0 | Status: DISCONTINUED | OUTPATIENT
Start: 2022-07-11 | End: 2022-07-12

## 2022-07-11 RX ORDER — AZITHROMYCIN 500 MG/1
500 TABLET, FILM COATED ORAL ONCE
Refills: 0 | Status: COMPLETED | OUTPATIENT
Start: 2022-07-11 | End: 2022-07-11

## 2022-07-11 RX ORDER — MIDAZOLAM HYDROCHLORIDE 1 MG/ML
4 INJECTION, SOLUTION INTRAMUSCULAR; INTRAVENOUS ONCE
Refills: 0 | Status: DISCONTINUED | OUTPATIENT
Start: 2022-07-11 | End: 2022-07-11

## 2022-07-11 RX ORDER — ACETAMINOPHEN 500 MG
650 TABLET ORAL ONCE
Refills: 0 | Status: COMPLETED | OUTPATIENT
Start: 2022-07-11 | End: 2022-07-11

## 2022-07-11 RX ORDER — CHLORHEXIDINE GLUCONATE 213 G/1000ML
15 SOLUTION TOPICAL EVERY 12 HOURS
Refills: 0 | Status: DISCONTINUED | OUTPATIENT
Start: 2022-07-11 | End: 2022-07-17

## 2022-07-11 RX ORDER — ONDANSETRON 8 MG/1
4 TABLET, FILM COATED ORAL EVERY 6 HOURS
Refills: 0 | Status: DISCONTINUED | OUTPATIENT
Start: 2022-07-11 | End: 2022-07-11

## 2022-07-11 RX ORDER — SIMETHICONE 80 MG/1
80 TABLET, CHEWABLE ORAL THREE TIMES A DAY
Refills: 0 | Status: DISCONTINUED | OUTPATIENT
Start: 2022-07-11 | End: 2022-07-11

## 2022-07-11 RX ORDER — NOREPINEPHRINE BITARTRATE/D5W 8 MG/250ML
0.08 PLASTIC BAG, INJECTION (ML) INTRAVENOUS
Qty: 8 | Refills: 0 | Status: DISCONTINUED | OUTPATIENT
Start: 2022-07-11 | End: 2022-07-14

## 2022-07-11 RX ORDER — TRAMADOL HYDROCHLORIDE 50 MG/1
1 TABLET ORAL
Qty: 0 | Refills: 0 | DISCHARGE

## 2022-07-11 RX ORDER — INSULIN LISPRO 100/ML
VIAL (ML) SUBCUTANEOUS AT BEDTIME
Refills: 0 | Status: DISCONTINUED | OUTPATIENT
Start: 2022-07-11 | End: 2022-07-11

## 2022-07-11 RX ORDER — OXYCODONE AND ACETAMINOPHEN 5; 325 MG/1; MG/1
1 TABLET ORAL DAILY
Refills: 0 | Status: DISCONTINUED | OUTPATIENT
Start: 2022-07-11 | End: 2022-07-11

## 2022-07-11 RX ORDER — INSULIN LISPRO 100/ML
VIAL (ML) SUBCUTANEOUS
Refills: 0 | Status: DISCONTINUED | OUTPATIENT
Start: 2022-07-11 | End: 2022-07-11

## 2022-07-11 RX ORDER — INSULIN ASPART 100 [IU]/ML
10 INJECTION, SOLUTION SUBCUTANEOUS
Qty: 0 | Refills: 0 | DISCHARGE

## 2022-07-11 RX ORDER — ACETAMINOPHEN 500 MG
650 TABLET ORAL EVERY 6 HOURS
Refills: 0 | Status: DISCONTINUED | OUTPATIENT
Start: 2022-07-11 | End: 2022-07-12

## 2022-07-11 RX ORDER — PROPOFOL 10 MG/ML
10 INJECTION, EMULSION INTRAVENOUS
Qty: 1000 | Refills: 0 | Status: DISCONTINUED | OUTPATIENT
Start: 2022-07-11 | End: 2022-07-12

## 2022-07-11 RX ORDER — HYDROMORPHONE HYDROCHLORIDE 2 MG/ML
0.25 INJECTION INTRAMUSCULAR; INTRAVENOUS; SUBCUTANEOUS ONCE
Refills: 0 | Status: DISCONTINUED | OUTPATIENT
Start: 2022-07-11 | End: 2022-07-11

## 2022-07-11 RX ORDER — AZITHROMYCIN 500 MG/1
500 TABLET, FILM COATED ORAL EVERY 24 HOURS
Refills: 0 | Status: DISCONTINUED | OUTPATIENT
Start: 2022-07-12 | End: 2022-07-12

## 2022-07-11 RX ORDER — KETOROLAC TROMETHAMINE 30 MG/ML
15 SYRINGE (ML) INJECTION EVERY 8 HOURS
Refills: 0 | Status: DISCONTINUED | OUTPATIENT
Start: 2022-07-11 | End: 2022-07-11

## 2022-07-11 RX ORDER — ACETAMINOPHEN 500 MG
675 TABLET ORAL ONCE
Refills: 0 | Status: DISCONTINUED | OUTPATIENT
Start: 2022-07-11 | End: 2022-07-11

## 2022-07-11 RX ORDER — NIFEDIPINE 30 MG
60 TABLET, EXTENDED RELEASE 24 HR ORAL DAILY
Refills: 0 | Status: DISCONTINUED | OUTPATIENT
Start: 2022-07-11 | End: 2022-07-11

## 2022-07-11 RX ORDER — AZITHROMYCIN 500 MG/1
TABLET, FILM COATED ORAL
Refills: 0 | Status: DISCONTINUED | OUTPATIENT
Start: 2022-07-11 | End: 2022-07-11

## 2022-07-11 RX ORDER — KETOROLAC TROMETHAMINE 30 MG/ML
15 SYRINGE (ML) INJECTION ONCE
Refills: 0 | Status: DISCONTINUED | OUTPATIENT
Start: 2022-07-11 | End: 2022-07-11

## 2022-07-11 RX ORDER — SIMETHICONE 80 MG/1
80 TABLET, CHEWABLE ORAL
Refills: 0 | Status: DISCONTINUED | OUTPATIENT
Start: 2022-07-11 | End: 2022-07-11

## 2022-07-11 RX ORDER — ROCURONIUM BROMIDE 10 MG/ML
50 VIAL (ML) INTRAVENOUS ONCE
Refills: 0 | Status: COMPLETED | OUTPATIENT
Start: 2022-07-11 | End: 2022-07-11

## 2022-07-11 RX ORDER — FENTANYL CITRATE 50 UG/ML
0.5 INJECTION INTRAVENOUS
Qty: 2500 | Refills: 0 | Status: DISCONTINUED | OUTPATIENT
Start: 2022-07-11 | End: 2022-07-11

## 2022-07-11 RX ORDER — FENTANYL CITRATE 50 UG/ML
100 INJECTION INTRAVENOUS ONCE
Refills: 0 | Status: DISCONTINUED | OUTPATIENT
Start: 2022-07-11 | End: 2022-07-11

## 2022-07-11 RX ORDER — ENOXAPARIN SODIUM 100 MG/ML
40 INJECTION SUBCUTANEOUS EVERY 24 HOURS
Refills: 0 | Status: DISCONTINUED | OUTPATIENT
Start: 2022-07-11 | End: 2022-07-12

## 2022-07-11 RX ORDER — DEXTROSE 50 % IN WATER 50 %
15 SYRINGE (ML) INTRAVENOUS ONCE
Refills: 0 | Status: DISCONTINUED | OUTPATIENT
Start: 2022-07-11 | End: 2022-07-11

## 2022-07-11 RX ORDER — ROCURONIUM BROMIDE 10 MG/ML
30 VIAL (ML) INTRAVENOUS ONCE
Refills: 0 | Status: DISCONTINUED | OUTPATIENT
Start: 2022-07-11 | End: 2022-07-12

## 2022-07-11 RX ORDER — GLUCAGON INJECTION, SOLUTION 0.5 MG/.1ML
1 INJECTION, SOLUTION SUBCUTANEOUS ONCE
Refills: 0 | Status: DISCONTINUED | OUTPATIENT
Start: 2022-07-11 | End: 2022-07-23

## 2022-07-11 RX ORDER — SERTRALINE 25 MG/1
50 TABLET, FILM COATED ORAL DAILY
Refills: 0 | Status: DISCONTINUED | OUTPATIENT
Start: 2022-07-11 | End: 2022-07-11

## 2022-07-11 RX ORDER — ACETAMINOPHEN 500 MG
1000 TABLET ORAL ONCE
Refills: 0 | Status: COMPLETED | OUTPATIENT
Start: 2022-07-11 | End: 2022-07-11

## 2022-07-11 RX ORDER — DEXTROSE 50 % IN WATER 50 %
12.5 SYRINGE (ML) INTRAVENOUS ONCE
Refills: 0 | Status: DISCONTINUED | OUTPATIENT
Start: 2022-07-11 | End: 2022-07-11

## 2022-07-11 RX ORDER — LISINOPRIL 2.5 MG/1
10 TABLET ORAL DAILY
Refills: 0 | Status: DISCONTINUED | OUTPATIENT
Start: 2022-07-11 | End: 2022-07-11

## 2022-07-11 RX ORDER — MELOXICAM 15 MG/1
1 TABLET ORAL
Qty: 0 | Refills: 0 | DISCHARGE

## 2022-07-11 RX ORDER — PIPERACILLIN AND TAZOBACTAM 4; .5 G/20ML; G/20ML
3.38 INJECTION, POWDER, LYOPHILIZED, FOR SOLUTION INTRAVENOUS EVERY 8 HOURS
Refills: 0 | Status: COMPLETED | OUTPATIENT
Start: 2022-07-12 | End: 2022-07-18

## 2022-07-11 RX ORDER — NOREPINEPHRINE BITARTRATE/D5W 8 MG/250ML
0.05 PLASTIC BAG, INJECTION (ML) INTRAVENOUS
Qty: 8 | Refills: 0 | Status: DISCONTINUED | OUTPATIENT
Start: 2022-07-11 | End: 2022-07-11

## 2022-07-11 RX ORDER — FENTANYL CITRATE 50 UG/ML
0.5 INJECTION INTRAVENOUS
Qty: 5000 | Refills: 0 | Status: DISCONTINUED | OUTPATIENT
Start: 2022-07-11 | End: 2022-07-11

## 2022-07-11 RX ORDER — FENTANYL CITRATE 50 UG/ML
1 INJECTION INTRAVENOUS
Qty: 5000 | Refills: 0 | Status: DISCONTINUED | OUTPATIENT
Start: 2022-07-11 | End: 2022-07-14

## 2022-07-11 RX ADMIN — Medication 650 MILLIGRAM(S): at 07:07

## 2022-07-11 RX ADMIN — PANTOPRAZOLE SODIUM 40 MILLIGRAM(S): 20 TABLET, DELAYED RELEASE ORAL at 05:50

## 2022-07-11 RX ADMIN — FENTANYL CITRATE 4.23 MICROGRAM(S)/KG/HR: 50 INJECTION INTRAVENOUS at 21:40

## 2022-07-11 RX ADMIN — Medication 400 MILLIGRAM(S): at 10:40

## 2022-07-11 RX ADMIN — PROPOFOL 50 MILLIGRAM(S): 10 INJECTION, EMULSION INTRAVENOUS at 18:32

## 2022-07-11 RX ADMIN — FENTANYL CITRATE 100 MICROGRAM(S): 50 INJECTION INTRAVENOUS at 18:38

## 2022-07-11 RX ADMIN — PIPERACILLIN AND TAZOBACTAM 200 GRAM(S): 4; .5 INJECTION, POWDER, LYOPHILIZED, FOR SOLUTION INTRAVENOUS at 09:56

## 2022-07-11 RX ADMIN — PROPOFOL 10.2 MICROGRAM(S)/KG/MIN: 10 INJECTION, EMULSION INTRAVENOUS at 21:40

## 2022-07-11 RX ADMIN — FENTANYL CITRATE 50 MICROGRAM(S): 50 INJECTION INTRAVENOUS at 18:40

## 2022-07-11 RX ADMIN — PIPERACILLIN AND TAZOBACTAM 25 GRAM(S): 4; .5 INJECTION, POWDER, LYOPHILIZED, FOR SOLUTION INTRAVENOUS at 15:38

## 2022-07-11 RX ADMIN — MIDAZOLAM HYDROCHLORIDE 4 MILLIGRAM(S): 1 INJECTION, SOLUTION INTRAMUSCULAR; INTRAVENOUS at 18:45

## 2022-07-11 RX ADMIN — Medication 100 MILLIEQUIVALENT(S): at 04:32

## 2022-07-11 RX ADMIN — FENTANYL CITRATE 50 MICROGRAM(S): 50 INJECTION INTRAVENOUS at 18:50

## 2022-07-11 RX ADMIN — Medication 15 MILLIGRAM(S): at 10:10

## 2022-07-11 RX ADMIN — Medication 650 MILLIGRAM(S): at 19:30

## 2022-07-11 RX ADMIN — Medication 15 MILLIGRAM(S): at 09:44

## 2022-07-11 RX ADMIN — SERTRALINE 50 MILLIGRAM(S): 25 TABLET, FILM COATED ORAL at 15:34

## 2022-07-11 RX ADMIN — Medication 15 MILLIGRAM(S): at 10:37

## 2022-07-11 RX ADMIN — Medication 650 MILLIGRAM(S): at 02:53

## 2022-07-11 RX ADMIN — MIDAZOLAM HYDROCHLORIDE 4 MILLIGRAM(S): 1 INJECTION, SOLUTION INTRAMUSCULAR; INTRAVENOUS at 21:05

## 2022-07-11 RX ADMIN — PROPOFOL 50 MILLIGRAM(S): 10 INJECTION, EMULSION INTRAVENOUS at 18:25

## 2022-07-11 RX ADMIN — PIPERACILLIN AND TAZOBACTAM 25 GRAM(S): 4; .5 INJECTION, POWDER, LYOPHILIZED, FOR SOLUTION INTRAVENOUS at 23:12

## 2022-07-11 RX ADMIN — Medication 250 MILLIGRAM(S): at 21:56

## 2022-07-11 RX ADMIN — PROPOFOL 50 MILLIGRAM(S): 10 INJECTION, EMULSION INTRAVENOUS at 18:36

## 2022-07-11 RX ADMIN — Medication 1000 MILLIGRAM(S): at 11:15

## 2022-07-11 RX ADMIN — Medication 100 MILLIEQUIVALENT(S): at 05:38

## 2022-07-11 RX ADMIN — Medication 50 MILLIGRAM(S): at 18:30

## 2022-07-11 RX ADMIN — OXYCODONE AND ACETAMINOPHEN 1 TABLET(S): 5; 325 TABLET ORAL at 01:53

## 2022-07-11 RX ADMIN — Medication 15 MILLIGRAM(S): at 02:52

## 2022-07-11 RX ADMIN — Medication 60 MILLIGRAM(S): at 05:49

## 2022-07-11 RX ADMIN — Medication 15 MILLIGRAM(S): at 11:15

## 2022-07-11 RX ADMIN — OXYCODONE AND ACETAMINOPHEN 1 TABLET(S): 5; 325 TABLET ORAL at 02:53

## 2022-07-11 RX ADMIN — Medication 10 MILLIGRAM(S): at 05:50

## 2022-07-11 RX ADMIN — Medication 650 MILLIGRAM(S): at 06:37

## 2022-07-11 RX ADMIN — MIDAZOLAM HYDROCHLORIDE 4 MILLIGRAM(S): 1 INJECTION, SOLUTION INTRAMUSCULAR; INTRAVENOUS at 18:30

## 2022-07-11 RX ADMIN — FENTANYL CITRATE 100 MICROGRAM(S): 50 INJECTION INTRAVENOUS at 21:00

## 2022-07-11 RX ADMIN — PROPOFOL 50 MILLIGRAM(S): 10 INJECTION, EMULSION INTRAVENOUS at 18:38

## 2022-07-11 RX ADMIN — Medication 650 MILLIGRAM(S): at 20:00

## 2022-07-11 RX ADMIN — PROPOFOL 10.2 MICROGRAM(S)/KG/MIN: 10 INJECTION, EMULSION INTRAVENOUS at 18:30

## 2022-07-11 RX ADMIN — Medication 50 MILLIGRAM(S): at 19:40

## 2022-07-11 RX ADMIN — SODIUM CHLORIDE 70 MILLILITER(S): 9 INJECTION, SOLUTION INTRAVENOUS at 15:49

## 2022-07-11 RX ADMIN — Medication 260 MILLIGRAM(S): at 02:54

## 2022-07-11 RX ADMIN — Medication 15 MILLIGRAM(S): at 02:22

## 2022-07-11 RX ADMIN — AZITHROMYCIN 255 MILLIGRAM(S): 500 TABLET, FILM COATED ORAL at 12:21

## 2022-07-11 RX ADMIN — FENTANYL CITRATE 100 MICROGRAM(S): 50 INJECTION INTRAVENOUS at 18:45

## 2022-07-11 RX ADMIN — AZITHROMYCIN 255 MILLIGRAM(S): 500 TABLET, FILM COATED ORAL at 23:11

## 2022-07-11 RX ADMIN — Medication 100 MILLIEQUIVALENT(S): at 03:06

## 2022-07-11 NOTE — PROCEDURE NOTE - NSPROCDETAILS_GEN_ALL_CORE
location identified, draped/prepped, sterile technique used, needle inserted/introduced/positive blood return obtained via catheter/connected to a pressurized flush line/sutured in place/hemostasis with direct pressure, dressing applied/all materials/supplies accounted for at end of procedure
guidewire recovered/lumen(s) aspirated and flushed/sterile dressing applied/sterile technique, catheter placed/ultrasound guidance with use of sterile gel and probe cove

## 2022-07-11 NOTE — CONSULT NOTE ADULT - ASSESSMENT
Fauzia Linares is a 52 year old with  CRPS, MO, DM2, HTN, HTN, IBS, chronic back pain, pseudotumor cerebri, recent spinal nerve stimulator placed (7/8/22 @ [facility]) who initially presented with hypoxic respiratory failure, found to have multifocal pna 2/2 aspiration pneumonia now with increased work of breathing on bipap requiring micu admission.     #Neuro  A/Ox3  //s/p spinal stimulator w/ chornic back pain, CRPS  - states back pain is controlled with device  -  cont tylenol q6 for pain/fever with Toradol q8 for break through pain   - cont sertraline   #Cardiovascular  //Sepsis  - hold   #Respiratory    #GI/Nutrition    #/Renal    #Skin    #ID    #Endocrine    #Hematologic/DVT ppx    #Ethics   Fauzia Linares is a 52 year old with  CRPS, MO, DM2, HTN, HTN, IBS, chronic back pain, pseudotumor cerebri, recent spinal nerve stimulator placed (7/8/22 @ [facility]) who initially presented with hypoxic respiratory failure, found to have multifocal pna 2/2 aspiration pneumonia now with increased work of breathing on bipap requiring micu admission.     #Neuro  A/Ox3  //s/p spinal stimulator w/ chornic back pain, CRPS  - states back pain is controlled with device  -  cont tylenol q6 for pain/fever and Toradol q8   - cont home sertraline     #Cardiovascular  //Sepsis  - HD stable, not requiring pressors   - will cont home nifedipineXL, and hold lisinopril, lasix     #Respiratory  //hypoxic resp failure requiring bipap  - CT demonstrating significant bilat pna   - inc WOB on bipap 12/8/70% w/ RR in 30s satting 90%  - Gas 7.36/37 on vBG  - will try Hiflo to match pt's flow demand and switch back to bipap if fails   - will monitor resp status closely for possible intubation     #GI/Nutrition  //hx IBS   - NPO except meds on bipap   - cont home protonix, bentyl, simethicone, loperamide qd     #/Renal  - voiding freely, no evidence of JUDD  - D5/LR @70 while NPO     #ID  //Asp Pna   - febrile to 104.1 w/ increasing wbc and cont to monitor fever curve   - cont zosyn/vanc for 7 day course and zithro for 5 days for asp pna   - f/u bcx, Ucx, sputum cx, and MRSA swab, urine legionella/strep and narrow abx accordingly   -UA, RVP neg     #Endocrine  //T2DM  -  FS well controlled   - goal BG<180  - cont w/ sliding scale q6hr while NPO     #Hematologic/DVT ppx  - lovenox for dvt ppx     #Ethics  full code     case and plan discussed with Dr Betancourt

## 2022-07-11 NOTE — CONSULT NOTE ADULT - ASSESSMENT
52F c hx CRPS, MO, DM2, HTN, HTN, IBS, chronic back pain, pseudotumor cerebri, recent spinal nerve stimulator placed (7/8/22 @ [facility]) presented with SOB, found to have multifocal pneumonia. MICU consulted for increased WOB. Patient satting well on bipap, rectal temperature 104 when evaluated.     Plan:   -no MICU needs at this time. No resp distress, breathing seems mostly limited by chest pain. Saturating well on bipap.   -pain and fever control per primary team   -antibiotics for pneumonia       Please reconsult as needed

## 2022-07-11 NOTE — PROGRESS NOTE ADULT - SUBJECTIVE AND OBJECTIVE BOX
PROGRESS NOTE:     Patient is a 52y old  Female who presents with a chief complaint of sob, fever, cough (11 Jul 2022 02:26)      SUBJECTIVE / OVERNIGHT EVENTS:    ADDITIONAL REVIEW OF SYSTEMS: 10 point ROS negative except per HPI    MEDICATIONS  (STANDING):  dextrose 5%. 1000 milliLiter(s) (50 mL/Hr) IV Continuous <Continuous>  dextrose 5%. 1000 milliLiter(s) (100 mL/Hr) IV Continuous <Continuous>  dextrose 50% Injectable 25 Gram(s) IV Push once  dextrose 50% Injectable 12.5 Gram(s) IV Push once  dextrose 50% Injectable 25 Gram(s) IV Push once  dicyclomine 10 milliGRAM(s) Oral before breakfast  glucagon  Injectable 1 milliGRAM(s) IntraMuscular once  insulin lispro (ADMELOG) corrective regimen sliding scale   SubCutaneous three times a day before meals  insulin lispro (ADMELOG) corrective regimen sliding scale   SubCutaneous at bedtime  lisinopril 10 milliGRAM(s) Oral daily  NIFEdipine XL 60 milliGRAM(s) Oral daily  pantoprazole    Tablet 40 milliGRAM(s) Oral before breakfast  piperacillin/tazobactam IVPB. 3.375 Gram(s) IV Intermittent once  piperacillin/tazobactam IVPB.. 3.375 Gram(s) IV Intermittent every 8 hours  sertraline 50 milliGRAM(s) Oral daily  simethicone 80 milliGRAM(s) Chew three times a day    MEDICATIONS  (PRN):  acetaminophen     Tablet .. 650 milliGRAM(s) Oral every 6 hours PRN Temp greater or equal to 38C (100.4F), Mild Pain (1 - 3)  dextrose Oral Gel 15 Gram(s) Oral once PRN Blood Glucose LESS THAN 70 milliGRAM(s)/deciliter  ketorolac   Injectable 15 milliGRAM(s) IV Push every 8 hours PRN Moderate Pain (4 - 6)  loperamide 2 milliGRAM(s) Oral every 4 hours PRN Diarrhea  ondansetron Injectable 4 milliGRAM(s) IV Push every 6 hours PRN Nausea and/or Vomiting  oxycodone    5 mG/acetaminophen 325 mG 1 Tablet(s) Oral daily PRN Moderate Pain (4 - 6)      CAPILLARY BLOOD GLUCOSE      POCT Blood Glucose.: 101 mg/dL (11 Jul 2022 05:54)  POCT Blood Glucose.: 111 mg/dL (11 Jul 2022 03:34)  POCT Blood Glucose.: 130 mg/dL (10 Jul 2022 22:36)    I&O's Summary      PHYSICAL EXAM:  Vital Signs Last 24 Hrs  T(C): 39.4 (11 Jul 2022 06:08), Max: 40.1 (11 Jul 2022 02:07)  T(F): 103 (11 Jul 2022 06:08), Max: 104.1 (11 Jul 2022 02:07)  HR: 87 (11 Jul 2022 05:54) (78 - 103)  BP: 156/75 (11 Jul 2022 05:54) (105/80 - 165/74)  BP(mean): 88 (10 Jul 2022 20:51) (88 - 88)  RR: 32 (11 Jul 2022 05:54) (22 - 41)  SpO2: 93% (11 Jul 2022 05:54) (86% - 100%)    Parameters below as of 11 Jul 2022 05:54  Patient On (Oxygen Delivery Method): BiPAP/CPAP        CONSTITUTIONAL: NAD, well-developed, A&Ox3 to person, place, time.  RESPIRATORY: Normal respiratory effort; lungs are clear to auscultation bilaterally  CARDIOVASCULAR: Regular rate and rhythm, normal S1 and S2, no murmur/rub/gallop; No lower extremity edema; Peripheral pulses are 2+ bilaterally  ABDOMEN: Nontender to palpation, no rebound/guarding; No hepatosplenomegaly  MUSCLOSKELETAL: no clubbing or cyanosis of digits; no joint swelling or tenderness to palpation  NEURO: CN 2-12 grossly intact, moves all limbs spontaneously      LABS:                          10.8   11.21 )-----------( 327      ( 11 Jul 2022 07:34 )             35.8     07-10    142  |  108  |  13  ----------------------------<  75  3.4<L>   |  20<L>  |  0.97    Ca    8.5      10 Jul 2022 14:39    TPro  7.8  /  Alb  3.8  /  TBili  0.6  /  DBili  x   /  AST  31  /  ALT  45  /  AlkPhos  143<H>  07-10    PT/INR - ( 10 Jul 2022 14:39 )   PT: 13.9 sec;   INR: 1.21 ratio         PTT - ( 10 Jul 2022 14:39 )  PTT:28.0 sec      -----    MICRO      -----    TRENDS  Hemoglobin: 10.8 g/dL (07-11 @ 07:34)  Hemoglobin: 11.0 g/dL (07-10 @ 14:39)    Creatinine Trend: 0.97<--, 0.93<--  ----  RADIOLOGY & ADDITIONAL TESTS:  Results Reviewed:   Imaging Personally Reviewed:  Electrocardiogram Personally Reviewed:    COORDINATION OF CARE:  Care Discussed with Consultants/Other Providers [Y/N]:  Prior or Outpatient Records Reviewed [Y/N]:

## 2022-07-11 NOTE — CONSULT NOTE ADULT - ASSESSMENT
Fauzia Linares is a 52 year old with  CRPS, MO, DM2, HTN, HTN, IBS, chronic back pain, pseudotumor cerebri, recent spinal nerve stimulator placed (7/8/22 @ [facility]) who initially presented with hypoxic respiratory failure and distress thought to be secondary to multifocal pneumonia.  She was initially placed on NRB in ED and escalated to BiPAP therapy given persistent hypoxemia and tachypnea.  Pulmonary was consulted for further evaluation    #Hypoxemic Respiratory Failure  - likely 2/2 to multifocal pneumonia (possibly due to aspiration) seen on CT scan 7/10/22  - patient on continuous BiPAP therapy to maintain o2 saturations, still tachypneic and reporting fatigue  - currently on vanc/zosyn/azithromycin for antibiotic therapy    Recommendations  - continue current antibiotic therapy  - consult MICU given persistent respiratory distress despite BiPAP therapy    Patient seen and discussed w/Dr. Mio Wade PGY5  [id number]

## 2022-07-11 NOTE — CONSULT NOTE ADULT - CRITICAL CARE ATTENDING COMMENT
Attending Attestation:    Patient seen and examined with resident/fellow.  Agree with above except as noted.  53 yo female morbidly obese, with chronic  back pain syndrome ,s/p spinal pain stimulator 2 days prior to admission, now with acute hypoxemic respiratory failure due to aspiration pneumonia.    1. Acute hypoxemic respiratory failure due to aspiration pneumonia. S/P spinal stimulator insertion done under conscious sedation at  [facility] 2 days prior to admission.  CT chest shows bilateral R> L  multilobar consolidation. Pt currently on BiPAP for work of breathing. RR 30-34. FIO2 70% with 02 saturation 89-92.         1. Admit to MICU         2. Trial of Hi flow 02         3. Pt may need intubation and mechanical ventilation.         4.Continue with Vancomycin and Zosyn for aspiration pneumonia. Azithromycin for 3 days of 500mg.         5. Follow legionella urine antigen blood and sputum cx.          2. DVT prophylaxis  Lovenox  3. D5 LR 75 cc/hr  4. GOC. Full code                This patient is critically ill and required frequent bedside visits with therapy change.  Total critical care time spent was 35__ minutes.

## 2022-07-11 NOTE — PROCEDURE NOTE - NSTRACHCUFF_RESP_A_CORE
Pt seen in the walk in on 4/23, physician placed a ASAP order for neurology (Lumbar radiculopathy [M54.16 (ICD-10-CM)]  Numbness and tingling of left leg). Please contact pt to schedule appointment.    cuffed

## 2022-07-11 NOTE — PATIENT PROFILE ADULT - FALL HARM RISK - HARM RISK INTERVENTIONS

## 2022-07-11 NOTE — CONSULT NOTE ADULT - SUBJECTIVE AND OBJECTIVE BOX
CHIEF COMPLAINT:  shortness of breath     HPI:      Fauzia Linares is a 52 year old with  CRPS, MO, DM2, HTN, HTN, IBS, chronic back pain, pseudotumor cerebri, recent spinal nerve stimulator placed (7/8/22 @ [facility]) who initially presented with hypoxic respiratory failure and distress thought to be secondary to multifocal pneumonia.  She was initially placed on NRB in ED and escalated to BiPAP therapy given persistent hypoxemia and tachypnea.  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;intercostal neuralgia&quot;)      Cardiac abnormality  (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;cardiac event&quot;; is unclear on diagnosis; denies hx/o MI)      Diabetes      Essential hypertension      IBS (irritable bowel syndrome)      Complex regional pain syndrome type 1      History of cholecystectomy      History of hand surgery  (pt states she had 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12)          FAMILY HISTORY:  No pertinent family history in first degree relatives    No pertinent family history in first degree relatives        SOCIAL HISTORY:  Smoking: [ ] Never Smoked [ ] Former Smoker (__ packs x ___ years) [ ] Current Smoker  (__ packs x ___ years)  Substance Use: [ ] Never Used [ ] Used ____  EtOH Use:  Marital Status: [ ] Single [ ]  [ ]  [ ]   Sexual History:   Occupation:  Recent Travel:  Country of Birth:  Advance Directives:    Allergies    amitriptyline (Other)    Intolerances        HOME MEDICATIONS:  Home Medications:  acetaminophen-oxyCODONE 325 mg-5 mg oral tablet: 1 tab(s) orally once a day, As Needed (11 Jul 2022 01:34)  Imodium 2 mg oral capsule: 1 cap(s) orally every 4 hours, As Needed (11 Jul 2022 01:34)  Lasix 40 mg oral tablet: 1 tab(s) orally once a day (in the morning) (11 Jul 2022 01:34)  lisinopril 10 mg oral tablet: 1 tab(s) orally once a day (11 Jul 2022 01:34)  NIFEdipine 60 mg oral tablet, extended release: 1 tab(s) orally once a day (11 Jul 2022 01:34)  NovoLOG 100 units/mL injectable solution: 10 unit(s) subcutaneous 3 times a day (with meals) (11 Jul 2022 01:34)  Protonix 40 mg oral delayed release tablet: 1 tab(s) orally once a day (in the morning) (11 Jul 2022 01:34)  Tresiba 100 units/mL subcutaneous solution: 80 unit(s) subcutaneous every 72 hours (11 Jul 2022 01:22)  Zoloft 50 mg oral tablet: 1 tab(s) orally once a day (in the morning) (11 Jul 2022 01:34)      REVIEW OF SYSTEMS:  Constitutional: [ ] negative [ ] fevers [ ] chills [ ] weight loss [ ] weight gain  HEENT: [ ] negative [ ] dry eyes [ ] eye irritation [ ] postnasal drip [ ] nasal congestion  CV: [ ] negative  [ ] chest pain [ ] orthopnea [ ] palpitations [ ] murmur  Resp: [ ] negative [ ] cough [ ] shortness of breath [ ] dyspnea [ ] wheezing [ ] sputum [ ] hemoptysis  GI: [ ] negative [ ] nausea [ ] vomiting [ ] diarrhea [ ] constipation [ ] abd pain [ ] dysphagia   : [ ] negative [ ] dysuria [ ] nocturia [ ] hematuria [ ] increased urinary frequency  Musculoskeletal: [ ] negative [ ] back pain [ ] myalgias [ ] arthralgias [ ] fracture  Skin: [ ] negative [ ] rash [ ] itch  Neurological: [ ] negative [ ] headache [ ] dizziness [ ] syncope [ ] weakness [ ] numbness  Psychiatric: [ ] negative [ ] anxiety [ ] depression  Endocrine: [ ] negative [ ] diabetes [ ] thyroid problem  Hematologic/Lymphatic: [ ] negative [ ] anemia [ ] bleeding problem  Allergic/Immunologic: [ ] negative [ ] itchy eyes [ ] nasal discharge [ ] hives [ ] angioedema  [ ] All other systems negative  [ ] Unable to assess ROS because ________    OBJECTIVE:  ICU Vital Signs Last 24 Hrs  T(C): 36.8 (11 Jul 2022 11:00), Max: 40.1 (11 Jul 2022 02:07)  T(F): 98.2 (11 Jul 2022 11:00), Max: 104.1 (11 Jul 2022 02:07)  HR: 89 (11 Jul 2022 11:00) (78 - 103)  BP: 122/58 (11 Jul 2022 11:00) (105/80 - 165/74)  BP(mean): 88 (10 Jul 2022 20:51) (88 - 88)  ABP: --  ABP(mean): --  RR: 30 (11 Jul 2022 11:00) (22 - 41)  SpO2: 94% (11 Jul 2022 11:00) (86% - 100%)    O2 Parameters below as of 11 Jul 2022 11:00  Patient On (Oxygen Delivery Method): BiPAP/CPAP              CAPILLARY BLOOD GLUCOSE      POCT Blood Glucose.: 113 mg/dL (11 Jul 2022 12:08)      PHYSICAL EXAM:  GENERAL: Alert. Not confused. +mod resp distress. Not thin. Not cachectic. +obese.  HEAD:  Atraumatic. Normocephalic.  EYES: EOMI. PERRLA. Normal conjunctiva/sclera.  ENT: Neck supple. No JVD. Moist oral mucosa. Not edentulous. No thrush.  LYMPH: Normal supraclavicular/cervical lymph nodes.   CARDIAC: +distance heart sounds. +tachy, Not maryuri. Regular rhythm. Not irregularly irregular. S1. S2.   LUNG/CHEST: CTAB. BS equal bilaterally. No wheezes. No rales. No rhonchi.  ABDOMEN: Soft. No tenderness. No distension. No fluid wave. Normal bowel sounds.  BACK: No midline/vertebral tenderness. No flank tenderness. battery pack and bloody tape in thoracic midline of spine  VASCULAR: +2 b/l radial or ulnar pulses. Palpable DP pulses.  EXTREMITIES:  No clubbing. No cyanosis. +2 b/l LE nonpitting edema. Moving all 4.  NEUROLOGY: A&Ox3. Non-focal exam. Cranial nerves intact. Normal speech. Sensation intact.  PSYCH: Normal behavior. Normal affect.  SKIN: No jaundice. No erythema. No rash/lesion.    LINES:     HOSPITAL MEDICATIONS:  Standing Meds:  azithromycin  IVPB      dextrose 5% + lactated ringers. 1000 milliLiter(s) IV Continuous <Continuous>  dextrose 5%. 1000 milliLiter(s) IV Continuous <Continuous>  dextrose 5%. 1000 milliLiter(s) IV Continuous <Continuous>  dextrose 50% Injectable 25 Gram(s) IV Push once  dextrose 50% Injectable 12.5 Gram(s) IV Push once  dextrose 50% Injectable 25 Gram(s) IV Push once  dicyclomine 10 milliGRAM(s) Oral before breakfast  glucagon  Injectable 1 milliGRAM(s) IntraMuscular once  insulin lispro (ADMELOG) corrective regimen sliding scale   SubCutaneous three times a day before meals  insulin lispro (ADMELOG) corrective regimen sliding scale   SubCutaneous at bedtime  lisinopril 10 milliGRAM(s) Oral daily  NIFEdipine XL 60 milliGRAM(s) Oral daily  pantoprazole    Tablet 40 milliGRAM(s) Oral before breakfast  piperacillin/tazobactam IVPB.. 3.375 Gram(s) IV Intermittent every 8 hours  sertraline 50 milliGRAM(s) Oral daily  simethicone 80 milliGRAM(s) Chew three times a day  vancomycin  IVPB 1000 milliGRAM(s) IV Intermittent every 12 hours      PRN Meds:  acetaminophen     Tablet .. 650 milliGRAM(s) Oral every 6 hours PRN  dextrose Oral Gel 15 Gram(s) Oral once PRN  ketorolac   Injectable 15 milliGRAM(s) IV Push every 8 hours PRN  loperamide 2 milliGRAM(s) Oral every 4 hours PRN  ondansetron Injectable 4 milliGRAM(s) IV Push every 6 hours PRN  oxycodone    5 mG/acetaminophen 325 mG 1 Tablet(s) Oral daily PRN      LABS:                        10.8   11.21 )-----------( 327      ( 11 Jul 2022 07:34 )             35.8     Hgb Trend: 10.8<--, 11.0<--  07-11    143  |  110<H>  |  16  ----------------------------<  90  4.5   |  16<L>  |  1.04    Ca    8.2<L>      11 Jul 2022 07:34  Phos  3.1     07-11  Mg     2.1     07-11    TPro  7.2  /  Alb  3.1<L>  /  TBili  0.6  /  DBili  x   /  AST  40  /  ALT  41  /  AlkPhos  142<H>  07-11    Creatinine Trend: 1.04<--, 0.97<--, 0.93<--  PT/INR - ( 10 Jul 2022 14:39 )   PT: 13.9 sec;   INR: 1.21 ratio         PTT - ( 10 Jul 2022 14:39 )  PTT:28.0 sec    Arterial Blood Gas:  07-11 @ 11:08  7.36/37/46/21/79.0/-4.1  ABG lactate: --    Venous Blood Gas:  07-11 @ 11:08  7.37/38/49/22/79.6  VBG Lactate: 1.4  Venous Blood Gas:  07-10 @ 21:00  7.33/43/34/23/50.6  VBG Lactate: 1.4  Venous Blood Gas:  07-10 @ 14:20  7.39/36/42/22/68.0  VBG Lactate: 1.1      MICROBIOLOGY:       RADIOLOGY:  [ ] Reviewed and interpreted by me    PULMONARY FUNCTION TESTS:    EKG:   CHIEF COMPLAINT:  shortness of breath     HPI:      Fauzia Linares is a 52 year old with  CRPS, MO, DM2, HTN, HTN, IBS, chronic back pain, pseudotumor cerebri, recent spinal nerve stimulator placed (7/8/22 @ [facility]) who initially presented with hypoxic respiratory failure and distress thought to be secondary to multifocal pneumonia.  She was initially placed on NRB in ED and escalated to BiPAP therapy given persistent hypoxemia and tachypnea.  Pulmonary was consulted for further evaluation    Patient was examined while on BiPAP.  She reported feeling short of breath, and stated that she was beginning to feel tired.  Further history deferred given patient's respiratory distress.      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;intercostal neuralgia&quot;)      Cardiac abnormality  (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;cardiac event&quot;; is unclear on diagnosis; denies hx/o MI)      Diabetes      Essential hypertension      IBS (irritable bowel syndrome)      Complex regional pain syndrome type 1      History of cholecystectomy      History of hand surgery  (pt states she had 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12)          FAMILY HISTORY:  No pertinent family history in first degree relatives    No pertinent family history in first degree relatives        SOCIAL HISTORY:  Smoking: [ ] Never Smoked [x ] Former Smoker (__ packs x ___ years) [ ] Current Smoker  (__ packs x ___ years)    Allergies    amitriptyline (Other)    Intolerances        HOME MEDICATIONS:  Home Medications:  acetaminophen-oxyCODONE 325 mg-5 mg oral tablet: 1 tab(s) orally once a day, As Needed (11 Jul 2022 01:34)  Imodium 2 mg oral capsule: 1 cap(s) orally every 4 hours, As Needed (11 Jul 2022 01:34)  Lasix 40 mg oral tablet: 1 tab(s) orally once a day (in the morning) (11 Jul 2022 01:34)  lisinopril 10 mg oral tablet: 1 tab(s) orally once a day (11 Jul 2022 01:34)  NIFEdipine 60 mg oral tablet, extended release: 1 tab(s) orally once a day (11 Jul 2022 01:34)  NovoLOG 100 units/mL injectable solution: 10 unit(s) subcutaneous 3 times a day (with meals) (11 Jul 2022 01:34)  Protonix 40 mg oral delayed release tablet: 1 tab(s) orally once a day (in the morning) (11 Jul 2022 01:34)  Tresiba 100 units/mL subcutaneous solution: 80 unit(s) subcutaneous every 72 hours (11 Jul 2022 01:22)  Zoloft 50 mg oral tablet: 1 tab(s) orally once a day (in the morning) (11 Jul 2022 01:34)      REVIEW OF SYSTEMS:  Constitutional: [ ] negative [ ] fevers [ ] chills [ ] weight loss [ ] weight gain, fatigue   HEENT: [ ] negative [ ] dry eyes [ ] eye irritation [ ] postnasal drip [ ] nasal congestion  CV: [ ] negative  [ ] chest pain [ ] orthopnea [ ] palpitations [ ] murmur  Resp: [ ] negative [ ] cough [x ] shortness of breath [ ] dyspnea [ ] wheezing [ ] sputum [ ] hemoptysis  GI: [ ] negative [ ] nausea [ ] vomiting [ ] diarrhea [ ] constipation [ ] abd pain [ ] dysphagia   : [ ] negative [ ] dysuria [ ] nocturia [ ] hematuria [ ] increased urinary frequency  Musculoskeletal: [ ] negative [ ] back pain [ ] myalgias [ ] arthralgias [ ] fracture  Skin: [ ] negative [ ] rash [ ] itch  Neurological: [ ] negative [ ] headache [ ] dizziness [ ] syncope [ ] weakness [ ] numbness  Psychiatric: [ ] negative [ ] anxiety [ ] depression  Endocrine: [ ] negative [ ] diabetes [ ] thyroid problem  Hematologic/Lymphatic: [ ] negative [ ] anemia [ ] bleeding problem  Allergic/Immunologic: [ ] negative [ ] itchy eyes [ ] nasal discharge [ ] hives [ ] angioedema  [x ] All other systems negative      OBJECTIVE:  ICU Vital Signs Last 24 Hrs  T(C): 36.8 (11 Jul 2022 11:00), Max: 40.1 (11 Jul 2022 02:07)  T(F): 98.2 (11 Jul 2022 11:00), Max: 104.1 (11 Jul 2022 02:07)  HR: 89 (11 Jul 2022 11:00) (78 - 103)  BP: 122/58 (11 Jul 2022 11:00) (105/80 - 165/74)  BP(mean): 88 (10 Jul 2022 20:51) (88 - 88)  ABP: --  ABP(mean): --  RR: 30 (11 Jul 2022 11:00) (22 - 41)  SpO2: 94% (11 Jul 2022 11:00) (86% - 100%)    O2 Parameters below as of 11 Jul 2022 11:00  Patient On (Oxygen Delivery Method): BiPAP/CPAP              CAPILLARY BLOOD GLUCOSE      POCT Blood Glucose.: 113 mg/dL (11 Jul 2022 12:08)      PHYSICAL EXAM:  GENERAL: Alert. Not confused. +mod resp distress.   HEAD:  Atraumatic. Normocephalic.  EYES: EOMI. PERRLA. Normal conjunctiva/sclera.  ENT: Neck supple. No JVD. Moist oral mucosa. Not edentulous. No thrush.  LYMPH: Normal supraclavicular/cervical lymph nodes.   CARDIAC: +distance heart sounds. +tachy, Not maryuri. Regular rhythm. Not irregularly irregular. S1. S2.   LUNG/CHEST: CTAB. BS equal bilaterally. No wheezes. No rales. No rhonchi.  ABDOMEN: Soft. No tenderness. No distension. No fluid wave. Normal bowel sounds.  BACK: No midline/vertebral tenderness. No flank tenderness. battery pack and bloody tape in thoracic midline of spine  VASCULAR: +2 b/l radial or ulnar pulses. Palpable DP pulses.  EXTREMITIES:  No clubbing. No cyanosis. +2 b/l LE nonpitting edema. Moving all 4.  NEUROLOGY: A&Ox3. Non-focal exam. Cranial nerves intact. Normal speech. Sensation intact.  PSYCH: Normal behavior. Normal affect.  SKIN: No jaundice. No erythema. No rash/lesion.    LINES:     HOSPITAL MEDICATIONS:  Standing Meds:  azithromycin  IVPB      dextrose 5% + lactated ringers. 1000 milliLiter(s) IV Continuous <Continuous>  dextrose 5%. 1000 milliLiter(s) IV Continuous <Continuous>  dextrose 5%. 1000 milliLiter(s) IV Continuous <Continuous>  dextrose 50% Injectable 25 Gram(s) IV Push once  dextrose 50% Injectable 12.5 Gram(s) IV Push once  dextrose 50% Injectable 25 Gram(s) IV Push once  dicyclomine 10 milliGRAM(s) Oral before breakfast  glucagon  Injectable 1 milliGRAM(s) IntraMuscular once  insulin lispro (ADMELOG) corrective regimen sliding scale   SubCutaneous three times a day before meals  insulin lispro (ADMELOG) corrective regimen sliding scale   SubCutaneous at bedtime  lisinopril 10 milliGRAM(s) Oral daily  NIFEdipine XL 60 milliGRAM(s) Oral daily  pantoprazole    Tablet 40 milliGRAM(s) Oral before breakfast  piperacillin/tazobactam IVPB.. 3.375 Gram(s) IV Intermittent every 8 hours  sertraline 50 milliGRAM(s) Oral daily  simethicone 80 milliGRAM(s) Chew three times a day  vancomycin  IVPB 1000 milliGRAM(s) IV Intermittent every 12 hours      PRN Meds:  acetaminophen     Tablet .. 650 milliGRAM(s) Oral every 6 hours PRN  dextrose Oral Gel 15 Gram(s) Oral once PRN  ketorolac   Injectable 15 milliGRAM(s) IV Push every 8 hours PRN  loperamide 2 milliGRAM(s) Oral every 4 hours PRN  ondansetron Injectable 4 milliGRAM(s) IV Push every 6 hours PRN  oxycodone    5 mG/acetaminophen 325 mG 1 Tablet(s) Oral daily PRN      LABS:                        10.8   11.21 )-----------( 327      ( 11 Jul 2022 07:34 )             35.8     Hgb Trend: 10.8<--, 11.0<--  07-11    143  |  110<H>  |  16  ----------------------------<  90  4.5   |  16<L>  |  1.04    Ca    8.2<L>      11 Jul 2022 07:34  Phos  3.1     07-11  Mg     2.1     07-11    TPro  7.2  /  Alb  3.1<L>  /  TBili  0.6  /  DBili  x   /  AST  40  /  ALT  41  /  AlkPhos  142<H>  07-11    Creatinine Trend: 1.04<--, 0.97<--, 0.93<--  PT/INR - ( 10 Jul 2022 14:39 )   PT: 13.9 sec;   INR: 1.21 ratio         PTT - ( 10 Jul 2022 14:39 )  PTT:28.0 sec    Arterial Blood Gas:  07-11 @ 11:08  7.36/37/46/21/79.0/-4.1  ABG lactate: --    Venous Blood Gas:  07-11 @ 11:08  7.37/38/49/22/79.6  VBG Lactate: 1.4  Venous Blood Gas:  07-10 @ 21:00  7.33/43/34/23/50.6  VBG Lactate: 1.4  Venous Blood Gas:  07-10 @ 14:20  7.39/36/42/22/68.0  VBG Lactate: 1.1      MICROBIOLOGY:       RADIOLOGY:  [ ] Reviewed and interpreted by me    PULMONARY FUNCTION TESTS:    EKG: CHIEF COMPLAINT:  shortness of breath     HPI:    Fauzia Linares is a 52 year old with  CRPS, MO, DM2, HTN, HTN, IBS, chronic back pain, pseudotumor cerebri, recent spinal nerve stimulator placed (7/8/22 @ [facility]) who initially presented with hypoxic respiratory failure and distress thought to be secondary to multifocal pneumonia.  She was initially placed on NRB in ED and escalated to BiPAP therapy given persistent hypoxemia and tachypnea.  Pulmonary was consulted for further evaluation    Patient was examined while on BiPAP.  She reported feeling short of breath, and stated that she was beginning to feel tired. Further history deferred given patient's respiratory distress.      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;intercostal neuralgia&quot;)      Cardiac abnormality  (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;cardiac event&quot;; is unclear on diagnosis; denies hx/o MI)      Diabetes      Essential hypertension      IBS (irritable bowel syndrome)      Complex regional pain syndrome type 1      History of cholecystectomy      History of hand surgery  (pt states she had 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12)          FAMILY HISTORY:  No pertinent family history in first degree relatives            SOCIAL HISTORY:  Smoking: [ ] Never Smoked [x ] Former Smoker (__ packs x ___ years) [ ] Current Smoker  (__ packs x ___ years)    Allergies    amitriptyline (Other)    Intolerances        HOME MEDICATIONS:  Home Medications:  acetaminophen-oxyCODONE 325 mg-5 mg oral tablet: 1 tab(s) orally once a day, As Needed (11 Jul 2022 01:34)  Imodium 2 mg oral capsule: 1 cap(s) orally every 4 hours, As Needed (11 Jul 2022 01:34)  Lasix 40 mg oral tablet: 1 tab(s) orally once a day (in the morning) (11 Jul 2022 01:34)  lisinopril 10 mg oral tablet: 1 tab(s) orally once a day (11 Jul 2022 01:34)  NIFEdipine 60 mg oral tablet, extended release: 1 tab(s) orally once a day (11 Jul 2022 01:34)  NovoLOG 100 units/mL injectable solution: 10 unit(s) subcutaneous 3 times a day (with meals) (11 Jul 2022 01:34)  Protonix 40 mg oral delayed release tablet: 1 tab(s) orally once a day (in the morning) (11 Jul 2022 01:34)  Tresiba 100 units/mL subcutaneous solution: 80 unit(s) subcutaneous every 72 hours (11 Jul 2022 01:22)  Zoloft 50 mg oral tablet: 1 tab(s) orally once a day (in the morning) (11 Jul 2022 01:34)      REVIEW OF SYSTEMS:  Constitutional: [ ] negative [x] fevers [ ] chills [ ] weight loss [ ] weight gain, fatigue   HEENT: [x] negative [ ] dry eyes [ ] eye irritation [ ] postnasal drip [ ] nasal congestion  CV: [x] negative  [ ] chest pain [ ] orthopnea [ ] palpitations [ ] murmur  Resp: [ ] negative [ ] cough [x] shortness of breath [ ] dyspnea [ ] wheezing [ ] sputum [ ] hemoptysis  GI: [x] negative [ ] nausea [ ] vomiting [ ] diarrhea [ ] constipation [ ] abd pain [ ] dysphagia   : [x] negative [ ] dysuria [ ] nocturia [ ] hematuria [ ] increased urinary frequency  Musculoskeletal: [x] negative [ ] back pain [ ] myalgias [ ] arthralgias [ ] fracture  Skin: [x] negative [ ] rash [ ] itch  Neurological: [x] negative [ ] headache [ ] dizziness [ ] syncope [ ] weakness [ ] numbness  Psychiatric: [x] negative [ ] anxiety [ ] depression  Endocrine: [x] negative [ ] diabetes [ ] thyroid problem  Hematologic/Lymphatic: [ ] negative [ ] anemia [ ] bleeding problem  Allergic/Immunologic: [ ] negative [ ] itchy eyes [ ] nasal discharge [ ] hives [ ] angioedema  [x ] All other systems negative      OBJECTIVE:  ICU Vital Signs Last 24 Hrs  T(C): 36.8 (11 Jul 2022 11:00), Max: 40.1 (11 Jul 2022 02:07)  T(F): 98.2 (11 Jul 2022 11:00), Max: 104.1 (11 Jul 2022 02:07)  HR: 89 (11 Jul 2022 11:00) (78 - 103)  BP: 122/58 (11 Jul 2022 11:00) (105/80 - 165/74)  BP(mean): 88 (10 Jul 2022 20:51) (88 - 88)  ABP: --  ABP(mean): --  RR: 30 (11 Jul 2022 11:00) (22 - 41)  SpO2: 94% (11 Jul 2022 11:00) (86% - 100%)    O2 Parameters below as of 11 Jul 2022 11:00  Patient On (Oxygen Delivery Method): BiPAP/CPAP              CAPILLARY BLOOD GLUCOSE      POCT Blood Glucose.: 113 mg/dL (11 Jul 2022 12:08)      PHYSICAL EXAM:  GENERAL: Alert. Not confused. +resp distress.   HEAD:  Atraumatic. Normocephalic.  EYES: EOMI. PERRLA. Normal conjunctiva/sclera.  ENT: Neck supple. No JVD. Moist oral mucosa. Not edentulous. No thrush.  LYMPH: Normal supraclavicular/cervical lymph nodes.   CARDIAC: +tachy,  Regular rhythm. . S1. S2.   LUNG/CHEST: CTAB. BS equal bilaterally. No wheezes. No rales. No rhonchi.  ABDOMEN: Soft. No tenderness. No distension.   EXTREMITIES:  No clubbing. No cyanosis. +2 b/l LE nonpitting edema. Moving all 4.  NEUROLOGY: A&Ox3. Non-focal exam. Cranial nerves intact. Normal speech. Sensation intact.  PSYCH: Normal behavior. Normal affect.  SKIN: No jaundice. No erythema. No rash/lesion.    LINES:     HOSPITAL MEDICATIONS:  Standing Meds:  azithromycin  IVPB      dextrose 5% + lactated ringers. 1000 milliLiter(s) IV Continuous <Continuous>  dextrose 5%. 1000 milliLiter(s) IV Continuous <Continuous>  dextrose 5%. 1000 milliLiter(s) IV Continuous <Continuous>  dextrose 50% Injectable 25 Gram(s) IV Push once  dextrose 50% Injectable 12.5 Gram(s) IV Push once  dextrose 50% Injectable 25 Gram(s) IV Push once  dicyclomine 10 milliGRAM(s) Oral before breakfast  glucagon  Injectable 1 milliGRAM(s) IntraMuscular once  insulin lispro (ADMELOG) corrective regimen sliding scale   SubCutaneous three times a day before meals  insulin lispro (ADMELOG) corrective regimen sliding scale   SubCutaneous at bedtime  lisinopril 10 milliGRAM(s) Oral daily  NIFEdipine XL 60 milliGRAM(s) Oral daily  pantoprazole    Tablet 40 milliGRAM(s) Oral before breakfast  piperacillin/tazobactam IVPB.. 3.375 Gram(s) IV Intermittent every 8 hours  sertraline 50 milliGRAM(s) Oral daily  simethicone 80 milliGRAM(s) Chew three times a day  vancomycin  IVPB 1000 milliGRAM(s) IV Intermittent every 12 hours      PRN Meds:  acetaminophen     Tablet .. 650 milliGRAM(s) Oral every 6 hours PRN  dextrose Oral Gel 15 Gram(s) Oral once PRN  ketorolac   Injectable 15 milliGRAM(s) IV Push every 8 hours PRN  loperamide 2 milliGRAM(s) Oral every 4 hours PRN  ondansetron Injectable 4 milliGRAM(s) IV Push every 6 hours PRN  oxycodone    5 mG/acetaminophen 325 mG 1 Tablet(s) Oral daily PRN      LABS:                        10.8   11.21 )-----------( 327      ( 11 Jul 2022 07:34 )             35.8     Hgb Trend: 10.8<--, 11.0<--  07-11    143  |  110<H>  |  16  ----------------------------<  90  4.5   |  16<L>  |  1.04    Ca    8.2<L>      11 Jul 2022 07:34  Phos  3.1     07-11  Mg     2.1     07-11    TPro  7.2  /  Alb  3.1<L>  /  TBili  0.6  /  DBili  x   /  AST  40  /  ALT  41  /  AlkPhos  142<H>  07-11    Creatinine Trend: 1.04<--, 0.97<--, 0.93<--  PT/INR - ( 10 Jul 2022 14:39 )   PT: 13.9 sec;   INR: 1.21 ratio         PTT - ( 10 Jul 2022 14:39 )  PTT:28.0 sec    Arterial Blood Gas:  07-11 @ 11:08  7.36/37/46/21/79.0/-4.1  ABG lactate: --    Venous Blood Gas:  07-11 @ 11:08  7.37/38/49/22/79.6  VBG Lactate: 1.4  Venous Blood Gas:  07-10 @ 21:00  7.33/43/34/23/50.6  VBG Lactate: 1.4  Venous Blood Gas:  07-10 @ 14:20  7.39/36/42/22/68.0  VBG Lactate: 1.1      MICROBIOLOGY:       RADIOLOGY:  [x] Reviewed and interpreted by me  CT Chest 7/10/22 - bilateral multifocal consolidation

## 2022-07-11 NOTE — CONSULT NOTE ADULT - SUBJECTIVE AND OBJECTIVE BOX
MICU Accept Note    CHIEF COMPLAINT: SOb/fever/cough    HPI / INTERVAL HISTORY:  52F c hx CRPS, DM2, HTN, HTN, IBS, chronic back pain, pseudotumor cerebri, recent spinal nerve stimulator placed (7/8/22 @ [facility]), who presented on 7/10 with 2 days fevers, SOB, productive cough.    Pt reports having the neurostimulator placed at [facility] as an outpt procedure under conscious sedation, was observed, discharged home. That night at home, pt started to fever, SOB, productive cough. Pt states her symptoms have progressively gotten worse since then. Pt states she also can't lay flat 2/2 recent spinal stimulator. Reports b/l pleuritic chest pain when she coughs. Denies urinary symptoms, diarrhea, abd pain. Pt ambulates with walker at baseline. Pt was found to have significant multifocal pna on CT and admitted to medicine likely 2/2 ASP pna.    Hospital Course:  Since admission, pt SOB worsened on NRB requiring bipap. Pt fever also spiked to 104.1. MICU initially consulted for multifocal pna on Bipap, but at the time pt was not tachypnic. However, this AM pt became increasingly more tachypneic and reports feeling more tired on bipap 12/8/70% satting 89-90%. MICU was reconsulted for increased work of breathing on bipap.      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;intercostal neuralgia&quot;)      Cardiac abnormality  (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;cardiac event&quot;; is unclear on diagnosis; denies hx/o MI)      Diabetes      Essential hypertension      IBS (irritable bowel syndrome)      Complex regional pain syndrome type 1      History of cholecystectomy      History of hand surgery  (pt states she had 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 Requests her daughter Thea Matta or pt's sister to make medical decisions for pt if pt unable to.      HOME MEDICATIONS:  Home Medications:  acetaminophen-oxyCODONE 325 mg-5 mg oral tablet: 1 tab(s) orally once a day, As Needed (11 Jul 2022 01:34)  Imodium 2 mg oral capsule: 1 cap(s) orally every 4 hours, As Needed (11 Jul 2022 01:34)  Lasix 40 mg oral tablet: 1 tab(s) orally once a day (in the morning) (11 Jul 2022 01:34)  lisinopril 10 mg oral tablet: 1 tab(s) orally once a day (11 Jul 2022 01:34)  NIFEdipine 60 mg oral tablet, extended release: 1 tab(s) orally once a day (11 Jul 2022 01:34)  NovoLOG 100 units/mL injectable solution: 10 unit(s) subcutaneous 3 times a day (with meals) (11 Jul 2022 01:34)  Protonix 40 mg oral delayed release tablet: 1 tab(s) orally once a day (in the morning) (11 Jul 2022 01:34)  Tresiba 100 units/mL subcutaneous solution: 80 unit(s) subcutaneous every 72 hours (11 Jul 2022 01:22)  Zoloft 50 mg oral tablet: 1 tab(s) orally once a day (in the morning) (11 Jul 2022 01:34)        Allergies    amitriptyline (Other)    Intolerances          REVIEW OF SYSTEMS:  CONSTITUTIONAL: No weakness. +fevers. +chills. No rigors. No weight loss.   EYES: No blurry or double vision. No eye pain.  ENT: No hearing difficulty. No vertigo. No dysphagia. No sore throat. No Sinusitis/rhinorrhea.   NECK: No pain. No stiffness/rigidity.  CARDIAC: +pleuritic chest pain. No palpitations. No lightheadedness.   RESPIRATORY: +cough.  worsening SOB. No hemoptysis.  GASTROINTESTINAL: No abdominal pain. No nausea. No vomiting. No hematemesis. No diarrhea. No constipation.   GENITOURINARY: No dysuria. No frequency. No hesitancy.   NEUROLOGICAL: No numbness/tingling. No focal weakness. No urinary or fecal incontinence. No headache. No unsteady gait.  BACK: No back pain. No flank pain.  EXTREMITIES: +chronic lower extremity edema. Full ROM.   SKIN: No rashes. No itching. No other lesions.  PSYCHIATRIC: No depression. No anxiety. No SI/HI.  ALLERGIC: No lip swelling. No hives.  All other review of systems is negative unless indicated above.  Unless indicated above, unable to assess ROS 2/2    OBJECTIVE:  ICU Vital Signs Last 24 Hrs  T(C): 36.7 (11 Jul 2022 13:16), Max: 40.1 (11 Jul 2022 02:07)  T(F): 98.1 (11 Jul 2022 13:16), Max: 104.1 (11 Jul 2022 02:07)  HR: 78 (11 Jul 2022 13:16) (78 - 103)  BP: 133/69 (11 Jul 2022 13:16) (105/80 - 165/74)  BP(mean): 88 (10 Jul 2022 20:51) (88 - 88)  ABP: --  ABP(mean): --  RR: 35 (11 Jul 2022 13:16) (22 - 41)  SpO2: 90% (11 Jul 2022 13:16) (86% - 100%)    O2 Parameters below as of 11 Jul 2022 13:16  Patient On (Oxygen Delivery Method): BiPAP/CPAP              CAPILLARY BLOOD GLUCOSE      POCT Blood Glucose.: 113 mg/dL (11 Jul 2022 12:08)      PHYSICAL EXAM:  GENERAL: Alert. Not confused. +mod resp distress. +obese.  HEAD:  Atraumatic. Normocephalic.  EYES: EOMI. PERRLA. Normal conjunctiva/sclera.  ENT: Neck supple. No JVD. Moist oral mucosa. Not edentulous. No thrush.  LYMPH: Normal supraclavicular/cervical lymph nodes.   CARDIAC: +distance heart sounds. +tachy, Not maryuri. Regular rhythm. Not irregularly irregular. S1. S2.   LUNG/CHEST: CTAB. BS equal bilaterally. No wheezes. No rales. No rhonchi.  ABDOMEN: Soft. No tenderness. No distension. No fluid wave. Normal bowel sounds.  BACK: No midline/vertebral tenderness. No flank tenderness. battery pack and bloody tape in thoracic midline of spine  VASCULAR: +2 b/l radial or ulnar pulses. Palpable DP pulses.  EXTREMITIES:  No clubbing. No cyanosis. +2 b/l LE nonpitting edema. Moving all 4.  NEUROLOGY: A&Ox3. Non-focal exam. Cranial nerves intact. Normal speech. Sensation intact.  PSYCH: Normal behavior. Normal affect.  SKIN: No jaundice. No erythema. No rash/lesion.  Vascular Access: [facility] MEDICATIONS:  MEDICATIONS  (STANDING):  azithromycin  IVPB      dextrose 5% + lactated ringers. 1000 milliLiter(s) (70 mL/Hr) IV Continuous <Continuous>  dextrose 5%. 1000 milliLiter(s) (50 mL/Hr) IV Continuous <Continuous>  dextrose 5%. 1000 milliLiter(s) (100 mL/Hr) IV Continuous <Continuous>  dextrose 50% Injectable 25 Gram(s) IV Push once  dextrose 50% Injectable 12.5 Gram(s) IV Push once  dextrose 50% Injectable 25 Gram(s) IV Push once  dicyclomine 10 milliGRAM(s) Oral before breakfast  glucagon  Injectable 1 milliGRAM(s) IntraMuscular once  insulin lispro (ADMELOG) corrective regimen sliding scale   SubCutaneous three times a day before meals  insulin lispro (ADMELOG) corrective regimen sliding scale   SubCutaneous at bedtime  lisinopril 10 milliGRAM(s) Oral daily  NIFEdipine XL 60 milliGRAM(s) Oral daily  pantoprazole    Tablet 40 milliGRAM(s) Oral before breakfast  piperacillin/tazobactam IVPB.. 3.375 Gram(s) IV Intermittent every 8 hours  sertraline 50 milliGRAM(s) Oral daily  simethicone 80 milliGRAM(s) Chew three times a day  vancomycin  IVPB 1000 milliGRAM(s) IV Intermittent every 12 hours    MEDICATIONS  (PRN):  acetaminophen     Tablet .. 650 milliGRAM(s) Oral every 6 hours PRN Temp greater or equal to 38C (100.4F), Mild Pain (1 - 3)  dextrose Oral Gel 15 Gram(s) Oral once PRN Blood Glucose LESS THAN 70 milliGRAM(s)/deciliter  ketorolac   Injectable 15 milliGRAM(s) IV Push every 8 hours PRN Moderate Pain (4 - 6)  loperamide 2 milliGRAM(s) Oral every 4 hours PRN Diarrhea  ondansetron Injectable 4 milliGRAM(s) IV Push every 6 hours PRN Nausea and/or Vomiting  oxycodone    5 mG/acetaminophen 325 mG 1 Tablet(s) Oral daily PRN Moderate Pain (4 - 6)      LABS:                        10.8   11.21 )-----------( 327      ( 11 Jul 2022 07:34 )             35.8     Hgb Trend: 10.8<--, 11.0<--  07-11    143  |  110<H>  |  16  ----------------------------<  90  4.5   |  16<L>  |  1.04    Ca    8.2<L>      11 Jul 2022 07:34  Phos  3.1     07-11  Mg     2.1     07-11    TPro  7.2  /  Alb  3.1<L>  /  TBili  0.6  /  DBili  x   /  AST  40  /  ALT  41  /  AlkPhos  142<H>  07-11    Creatinine Trend: 1.04<--, 0.97<--, 0.93<--  PT/INR - ( 10 Jul 2022 14:39 )   PT: 13.9 sec;   INR: 1.21 ratio         PTT - ( 10 Jul 2022 14:39 )  PTT:28.0 sec    Arterial Blood Gas:  07-11 @ 11:08  7.36/37/46/21/79.0/-4.1  ABG lactate: --    Venous Blood Gas:  07-11 @ 11:08  7.37/38/49/22/79.6  VBG Lactate: 1.4  Venous Blood Gas:  07-10 @ 21:00  7.33/43/34/23/50.6  VBG Lactate: 1.4  Venous Blood Gas:  07-10 @ 14:20  7.39/36/42/22/68.0  VBG Lactate: 1.1      MICROBIOLOGY:     RADIOLOGY & ADDITIONAL TESTS:  COMPARISON: 7/10/2021 CT chest. CT abdomen 6/15/2022.    FINDINGS:    PULMONARY ARTERIES: No pulmonary embolism detected. Please multiple   segmental and subsegmental pulmonary arterial branches are not adequately   assessed due to motion.    MEDIASTINUM: Normal heart size. No pericardial effusion. Thoracic aorta   normal caliber.  Mildly enlarged mediastinal lymph nodes, likely reactive.    AIRWAYS, LUNGS, PLEURA: Multifocal bilateral lung consolidation, largest   in the right upper lobe and left lower lobe. No pleural effusion.    ABDOMEN and PELVIS: Cholecystectomy. The abdominal organs are otherwise   unremarkable. IUD in place. Unremarkable urinary bladder.    No bowel obstruction.  No free fluid. No abdominal or pelvic   lymphadenopathy. Abdominal aorta normal caliber.    BONES: Neurostimulator device within thoracic spinal canal.    SOFT TISSUES: Unremarkable.    IMPRESSION:    No pulmonary embolism detected.    Multifocal bilateral lung consolidation likely representing pneumonia.     MICU Accept Note    CHIEF COMPLAINT: SOb/fever/cough    HPI / INTERVAL HISTORY:  52F c hx CRPS, DM2, HTN, HTN, IBS, chronic back pain, pseudotumor cerebri, recent spinal nerve stimulator placed (7/8/22 @ [facility]), who presented on 7/10 with 2 days fevers, SOB, productive cough.    Pt reports having the neurostimulator placed at [facility] as an outpt procedure under conscious sedation, was observed, discharged home. That night at home, pt started to fever, SOB, productive cough. Pt states her symptoms have progressively gotten worse since then. Pt states she also can't lay flat 2/2 recent spinal stimulator. Reports b/l pleuritic chest pain when she coughs. Denies urinary symptoms, diarrhea, abd pain. Pt ambulates with walker at baseline. Pt was found to have significant multifocal pna on CT and admitted to medicine likely 2/2 ASP pna.    Hospital Course:  Since admission, pt SOB worsened on NRB requiring bipap. Pt fever also spiked to 104.1. MICU initially consulted for multifocal pna on Bipap, but at the time pt was not tachypnic. However, this AM pt became increasingly more tachypneic and reports feeling more tired on bipap 12/8/70% satting 89-90%. MICU was reconsulted for increased work of breathing on bipap.      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;intercostal neuralgia&quot;)      Cardiac abnormality  (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;cardiac event&quot;; is unclear on diagnosis; denies hx/o MI)      Diabetes      Essential hypertension      IBS (irritable bowel syndrome)      Complex regional pain syndrome type 1      History of cholecystectomy      History of hand surgery  (pt states she had 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 Requests her daughter Thea Matta or pt's sister to make medical decisions for pt if pt unable to.      HOME MEDICATIONS:  Home Medications:  acetaminophen-oxyCODONE 325 mg-5 mg oral tablet: 1 tab(s) orally once a day, As Needed (11 Jul 2022 01:34)  Imodium 2 mg oral capsule: 1 cap(s) orally every 4 hours, As Needed (11 Jul 2022 01:34)  Lasix 40 mg oral tablet: 1 tab(s) orally once a day (in the morning) (11 Jul 2022 01:34)  lisinopril 10 mg oral tablet: 1 tab(s) orally once a day (11 Jul 2022 01:34)  NIFEdipine 60 mg oral tablet, extended release: 1 tab(s) orally once a day (11 Jul 2022 01:34)  NovoLOG 100 units/mL injectable solution: 10 unit(s) subcutaneous 3 times a day (with meals) (11 Jul 2022 01:34)  Protonix 40 mg oral delayed release tablet: 1 tab(s) orally once a day (in the morning) (11 Jul 2022 01:34)  Tresiba 100 units/mL subcutaneous solution: 80 unit(s) subcutaneous every 72 hours (11 Jul 2022 01:22)  Zoloft 50 mg oral tablet: 1 tab(s) orally once a day (in the morning) (11 Jul 2022 01:34)        Allergies    amitriptyline (Other)    Intolerances          REVIEW OF SYSTEMS:  CONSTITUTIONAL: No weakness. +fevers. +chills. No rigors. No weight loss.   EYES: No blurry or double vision. No eye pain.  ENT: No hearing difficulty. No vertigo. No dysphagia. No sore throat. No Sinusitis/rhinorrhea.   NECK: No pain. No stiffness/rigidity.  CARDIAC: +pleuritic chest pain. No palpitations. No lightheadedness.   RESPIRATORY: +cough.  worsening SOB. No hemoptysis.  GASTROINTESTINAL: No abdominal pain. No nausea. No vomiting. No hematemesis. No diarrhea. No constipation.   GENITOURINARY: No dysuria. No frequency. No hesitancy.   NEUROLOGICAL: No numbness/tingling. No focal weakness. No urinary or fecal incontinence. No headache. No unsteady gait.  BACK: No back pain. No flank pain.  EXTREMITIES: +chronic lower extremity edema. Full ROM.   SKIN: No rashes. No itching. No other lesions.  PSYCHIATRIC: No depression. No anxiety. No SI/HI.  ALLERGIC: No lip swelling. No hives.  All other review of systems is negative unless indicated above.  Unless indicated above, unable to assess ROS 2/2    OBJECTIVE:  ICU Vital Signs Last 24 Hrs  T(C): 36.7 (11 Jul 2022 13:16), Max: 40.1 (11 Jul 2022 02:07)  T(F): 98.1 (11 Jul 2022 13:16), Max: 104.1 (11 Jul 2022 02:07)  HR: 78 (11 Jul 2022 13:16) (78 - 103)  BP: 133/69 (11 Jul 2022 13:16) (105/80 - 165/74)  BP(mean): 88 (10 Jul 2022 20:51) (88 - 88)  ABP: --  ABP(mean): --  RR: 35 (11 Jul 2022 13:16) (22 - 41)  SpO2: 90% (11 Jul 2022 13:16) (86% - 100%)    O2 Parameters below as of 11 Jul 2022 13:16  Patient On (Oxygen Delivery Method): BiPAP/CPAP              CAPILLARY BLOOD GLUCOSE      POCT Blood Glucose.: 113 mg/dL (11 Jul 2022 12:08)      PHYSICAL EXAM:  GENERAL: Alert. Not confused. +mod resp distress. +obese.  HEAD:  Atraumatic. Normocephalic.  EYES: EOMI. PERRLA. Normal conjunctiva/sclera.  ENT: Neck supple. No JVD. Moist oral mucosa. Not edentulous. No thrush.  LYMPH: Normal supraclavicular/cervical lymph nodes.   CARDIAC: +distance heart sounds. +tachy, Not maryuri. Regular rhythm. Not irregularly irregular. S1. S2.   LUNG/CHEST: CTAB. BS equal bilaterally. No wheezes. No rales. No rhonchi.  ABDOMEN: Soft. No tenderness. No distension. No fluid wave. Normal bowel sounds.  BACK: No midline/vertebral tenderness. No flank tenderness. battery pack and bloody tape in thoracic midline of spine  VASCULAR: +2 b/l radial or ulnar pulses. Palpable DP pulses.  EXTREMITIES:  No clubbing. No cyanosis. +2 b/l LE nonpitting edema. Moving all 4.  NEUROLOGY: A&Ox3. Non-focal exam. Cranial nerves intact. Normal speech. Sensation intact.  PSYCH: Normal behavior. Normal affect.  SKIN: No jaundice. No erythema. No rash/lesion.  Vascular Access: [facility] MEDICATIONS:  MEDICATIONS  (STANDING):  azithromycin  IVPB      dextrose 5% + lactated ringers. 1000 milliLiter(s) (70 mL/Hr) IV Continuous <Continuous>  dextrose 5%. 1000 milliLiter(s) (50 mL/Hr) IV Continuous <Continuous>  dextrose 5%. 1000 milliLiter(s) (100 mL/Hr) IV Continuous <Continuous>  dextrose 50% Injectable 25 Gram(s) IV Push once  dextrose 50% Injectable 12.5 Gram(s) IV Push once  dextrose 50% Injectable 25 Gram(s) IV Push once  dicyclomine 10 milliGRAM(s) Oral before breakfast  glucagon  Injectable 1 milliGRAM(s) IntraMuscular once  insulin lispro (ADMELOG) corrective regimen sliding scale   SubCutaneous three times a day before meals  insulin lispro (ADMELOG) corrective regimen sliding scale   SubCutaneous at bedtime  lisinopril 10 milliGRAM(s) Oral daily  NIFEdipine XL 60 milliGRAM(s) Oral daily  pantoprazole    Tablet 40 milliGRAM(s) Oral before breakfast  piperacillin/tazobactam IVPB.. 3.375 Gram(s) IV Intermittent every 8 hours  sertraline 50 milliGRAM(s) Oral daily  simethicone 80 milliGRAM(s) Chew three times a day  vancomycin  IVPB 1000 milliGRAM(s) IV Intermittent every 12 hours    MEDICATIONS  (PRN):  acetaminophen     Tablet .. 650 milliGRAM(s) Oral every 6 hours PRN Temp greater or equal to 38C (100.4F), Mild Pain (1 - 3)  dextrose Oral Gel 15 Gram(s) Oral once PRN Blood Glucose LESS THAN 70 milliGRAM(s)/deciliter  ketorolac   Injectable 15 milliGRAM(s) IV Push every 8 hours PRN Moderate Pain (4 - 6)  loperamide 2 milliGRAM(s) Oral every 4 hours PRN Diarrhea  ondansetron Injectable 4 milliGRAM(s) IV Push every 6 hours PRN Nausea and/or Vomiting  oxycodone    5 mG/acetaminophen 325 mG 1 Tablet(s) Oral daily PRN Moderate Pain (4 - 6)      LABS:                        10.8   11.21 )-----------( 327      ( 11 Jul 2022 07:34 )             35.8     Hgb Trend: 10.8<--, 11.0<--  07-11    143  |  110<H>  |  16  ----------------------------<  90  4.5   |  16<L>  |  1.04    Ca    8.2<L>      11 Jul 2022 07:34  Phos  3.1     07-11  Mg     2.1     07-11    TPro  7.2  /  Alb  3.1<L>  /  TBili  0.6  /  DBili  x   /  AST  40  /  ALT  41  /  AlkPhos  142<H>  07-11    Creatinine Trend: 1.04<--, 0.97<--, 0.93<--  PT/INR - ( 10 Jul 2022 14:39 )   PT: 13.9 sec;   INR: 1.21 ratio         PTT - ( 10 Jul 2022 14:39 )  PTT:28.0 sec    Arterial Blood Gas:  07-11 @ 11:08  7.36/37/46/21/79.0/-4.1  ABG lactate: --    Venous Blood Gas:  07-11 @ 11:08  7.37/38/49/22/79.6  VBG Lactate: 1.4  Venous Blood Gas:  07-10 @ 21:00  7.33/43/34/23/50.6  VBG Lactate: 1.4  Venous Blood Gas:  07-10 @ 14:20  7.39/36/42/22/68.0  VBG Lactate: 1.1      MICROBIOLOGY:     RADIOLOGY & ADDITIONAL TESTS:  COMPARISON: 7/10/2021 CT chest. CT abdomen 6/15/2022.    FINDINGS:    PULMONARY ARTERIES: No pulmonary embolism detected. Please multiple   segmental and subsegmental pulmonary arterial branches are not adequately   assessed due to motion.    MEDIASTINUM: Normal heart size. No pericardial effusion. Thoracic aorta   normal caliber.  Mildly enlarged mediastinal lymph nodes, likely reactive.    AIRWAYS, LUNGS, PLEURA: Multifocal bilateral lung consolidation, largest   in the right upper lobe and left lower lobe. No pleural effusion.    ABDOMEN and PELVIS: Cholecystectomy. The abdominal organs are otherwise   unremarkable. IUD in place. Unremarkable urinary bladder.    No bowel obstruction.  No free fluid. No abdominal or pelvic   lymphadenopathy. Abdominal aorta normal caliber.    BONES: Neurostimulator device within thoracic spinal canal.    SOFT TISSUES: Unremarkable.    IMPRESSION:    No pulmonary embolism detected.    Multifocal bilateral lung consolidation likely representing pneumonia.

## 2022-07-11 NOTE — PROGRESS NOTE ADULT - ATTENDING COMMENTS
This is a 52F c hx CRPS, MO, DM2, HTN, HTN, IBS, chronic back pain, pseudotumor cerebri, recent spinal nerve stimulator placed (7/8/22 @ [facility]), a/w acute hypoxic resp failure, sepsis due to 2/2 multifocal pneumonia, likely HCAP    - On Bipap, in respiratory failure, clinically deteriorated  - reviewed labs, imaging- neg Covid  - Pulmonary consult called, possible MICU transfer  - added IV Vanco, Azithromycin to IV Zosyn  - f/u blood c/s  - DVT ppx

## 2022-07-11 NOTE — CONSULT NOTE ADULT - ATTENDING COMMENTS
52F c hx CRPS, MO, DM2, HTN, HTN, IBS, chronic back pain, pseudotumor cerebri, recent spinal nerve stimulator placed (7/8/22 @ [facility]) presented with SOB, found to have multifocal pneumonia. MICU consulted for increased WOB. Patient satting well on bipap, rectal temperature 104 when evaluated.   She is SOB while febrile and in pain. Would reasses after fever and pain control. Pending torodol and IV tylenol now.  FIo2 is down to 30-40%  Once afebrile would change to High flow/nc  Continue treatment for PNA  OK to use Bipap 12/7 prn
52 year old female with complex regional pain syndrome, chronic back pain, pseudotumor cerebri, and recently placement of spinal nerve stimulator here with acute hypoxemic respiratory failure due to multifocal pneumonia. Pulmonary called for worsening dyspnea and tachypnea. On BiPAP at time of evaluation. Agree with broad spectrum coverage. Antipyretics for high fever. ICU to re-evaluate.

## 2022-07-11 NOTE — PROGRESS NOTE ADULT - ASSESSMENT
52F c hx CRPS, MO, DM2, HTN, HTN, IBS, chronic back pain, pseudotumor cerebri, recent spinal nerve stimulator placed (7/8/22 @ [facility]), pw acute hypoxic resp failure, respiratory distress, and sepsis 2/2 multifocal and likely aspiration pneumonia

## 2022-07-11 NOTE — CONSULT NOTE ADULT - SUBJECTIVE AND OBJECTIVE BOX
CHIEF COMPLAINT:     HPI:  52F c hx CRPS, MO, DM2, HTN, HTN, IBS, chronic back pain, pseudotumor cerebri, recent spinal nerve stimulator placed (7/8/22 @ [facility]), pw 2 days fevers, SOB, productive cough.    Pt reports having the neurostimulator placed at [facility] as an outpt procedure under conscious sedation, was observed, discharged home. That night at home, pt started to fever, SOB, productive cough. Pt states her symptoms have progressively gotten worse since then. Pt states she is having orthopnea and also can't lay flat 2/2 recent spinal stimulator. Reports b/l pleuritic rib pain. Denies urinary symptoms, diarrhea, abd pain. Pt ambulates with walker at baseline.   (10 Jul 2022 23:45)  MICU called for concern for increasing WOB.     FAMILY HISTORY:  No pertinent family history in first degree relatives    No pertinent family history in first degree relatives        SOCIAL HISTORY:  Smoking: __ packs x ___ years  EtOH Use:  Marital Status:  Occupation:  Recent Travel:  Country of Birth:  Advance Directives:    Allergies    amitriptyline (Other)    Intolerances        HOME MEDICATIONS:    REVIEW OF SYSTEMS:  Constitutional:   Eyes:  ENT:  CV:  Resp:  GI:  :  MSK:  Integumentary:  Neurological:  Psychiatric:  Endocrine:  Hematologic/Lymphatic:  Allergic/Immunologic:  [ ] All other systems negative  [ ] Unable to assess ROS because ________    OBJECTIVE:  ICU Vital Signs Last 24 Hrs  T(C): 40.1 (11 Jul 2022 02:07), Max: 40.1 (11 Jul 2022 02:07)  T(F): 104.1 (11 Jul 2022 02:07), Max: 104.1 (11 Jul 2022 02:07)  HR: 95 (11 Jul 2022 02:07) (78 - 103)  BP: 145/60 (11 Jul 2022 02:07) (105/80 - 165/74)  BP(mean): 88 (10 Jul 2022 20:51) (88 - 88)  ABP: --  ABP(mean): --  RR: 41 (11 Jul 2022 02:07) (22 - 41)  SpO2: 94% (11 Jul 2022 02:07) (86% - 100%)    O2 Parameters below as of 11 Jul 2022 02:07  Patient On (Oxygen Delivery Method): BiPAP/CPAP              CAPILLARY BLOOD GLUCOSE      POCT Blood Glucose.: 130 mg/dL (10 Jul 2022 22:36)      PHYSICAL EXAM:  General:   HEENT:   Lymph Nodes:  Neck:   Respiratory:   Cardiovascular:   Abdomen:   Extremities:   Skin:   Neurological:  Psychiatry:    HOSPITAL MEDICATIONS:  MEDICATIONS  (STANDING):  acetaminophen   IVPB .. 675 milliGRAM(s) IV Intermittent once  dicyclomine 10 milliGRAM(s) Oral before breakfast  NIFEdipine XL 60 milliGRAM(s) Oral daily  pantoprazole    Tablet 40 milliGRAM(s) Oral before breakfast  sertraline 50 milliGRAM(s) Oral daily    MEDICATIONS  (PRN):  acetaminophen     Tablet .. 650 milliGRAM(s) Oral every 6 hours PRN Temp greater or equal to 38.5C (101.3F)  ketorolac   Injectable 15 milliGRAM(s) IV Push every 8 hours PRN mild to moderate pain  loperamide 2 milliGRAM(s) Oral every 4 hours PRN Diarrhea  ondansetron Injectable 4 milliGRAM(s) IV Push every 6 hours PRN Nausea and/or Vomiting  oxycodone    5 mG/acetaminophen 325 mG 1 Tablet(s) Oral daily PRN Moderate Pain (4 - 6)  simethicone 80 milliGRAM(s) Chew three times a day PRN Gas      LABS:                        11.0   8.92  )-----------( 363      ( 10 Jul 2022 14:39 )             34.6     07-10    142  |  108  |  13  ----------------------------<  75  3.4<L>   |  20<L>  |  0.97    Ca    8.5      10 Jul 2022 14:39    TPro  7.8  /  Alb  3.8  /  TBili  0.6  /  DBili  x   /  AST  31  /  ALT  45  /  AlkPhos  143<H>  07-10    PT/INR - ( 10 Jul 2022 14:39 )   PT: 13.9 sec;   INR: 1.21 ratio         PTT - ( 10 Jul 2022 14:39 )  PTT:28.0 sec      Venous Blood Gas:  07-10 @ 21:00  7.33/43/34/23/50.6  VBG Lactate: 1.4  Venous Blood Gas:  07-10 @ 14:20  7.39/36/42/22/68.0  VBG Lactate: 1.1      MICROBIOLOGY:     RADIOLOGY:  [ ] Reviewed and interpreted by me    EKG: CHIEF COMPLAINT:     HPI:  52F c hx CRPS, MO, DM2, HTN, HTN, IBS, chronic back pain, pseudotumor cerebri, recent spinal nerve stimulator placed (7/8/22 @ [facility]), pw 2 days fevers, SOB, productive cough.    Pt reports having the neurostimulator placed at [facility] as an outpt procedure under conscious sedation, was observed, discharged home. That night at home, pt started to fever, SOB, productive cough. Pt states her symptoms have progressively gotten worse since then. Pt states she is having orthopnea and also can't lay flat 2/2 recent spinal stimulator. Reports b/l pleuritic rib pain. Denies urinary symptoms, diarrhea, abd pain. Pt ambulates with walker at baseline. In the ED, imaging significant for multifocal pneumonia. Patient placed on bipap for increased WOB.       FAMILY HISTORY:  No pertinent family history in first degree relatives    No pertinent family history in first degree relatives        SOCIAL HISTORY:  Smoking: __ packs x ___ years  EtOH Use:  Marital Status:  Occupation:  Recent Travel:  Country of Birth:  Advance Directives:    Allergies    amitriptyline (Other)    Intolerances        HOME MEDICATIONS:    REVIEW OF SYSTEMS:  Unable to assess    OBJECTIVE:  ICU Vital Signs Last 24 Hrs  T(C): 40.1 (11 Jul 2022 02:07), Max: 40.1 (11 Jul 2022 02:07)  T(F): 104.1 (11 Jul 2022 02:07), Max: 104.1 (11 Jul 2022 02:07)  HR: 95 (11 Jul 2022 02:07) (78 - 103)  BP: 145/60 (11 Jul 2022 02:07) (105/80 - 165/74)  BP(mean): 88 (10 Jul 2022 20:51) (88 - 88)  ABP: --  ABP(mean): --  RR: 41 (11 Jul 2022 02:07) (22 - 41)  SpO2: 94% (11 Jul 2022 02:07) (86% - 100%)    O2 Parameters below as of 11 Jul 2022 02:07  Patient On (Oxygen Delivery Method): BiPAP/CPAP              CAPILLARY BLOOD GLUCOSE      POCT Blood Glucose.: 130 mg/dL (10 Jul 2022 22:36)      PHYSICAL EXAM:  T(C): 40.1 (07-11-22 @ 02:07), Max: 40.1 (07-11-22 @ 02:07)  HR: 95 (07-11-22 @ 02:07) (78 - 103)  BP: 145/60 (07-11-22 @ 02:07) (105/80 - 165/74)  RR: 41 (07-11-22 @ 02:07) (22 - 41)  SpO2: 94% (07-11-22 @ 02:07) (86% - 100%)    GENERAL: patient appears uncomfortable, no distress  NECK: supple, soft, no thyromegaly noted  LUNGS: clear breath sounds, on bipap  HEART: soft S1/S2, regular rate and rhythm, no murmurs noted, 1+ LE edema   GASTROINTESTINAL: abdomen is soft, nontender, nondistended, normoactive bowel sounds, no palpable masses  BACK: battery pack and stiches on back, site appears clean and dry   MUSCULOSKELETAL: no clubbing or cyanosis, no obvious deformity  NEUROLOGIC: awake, alert, oriented x3, good muscle tone in 4 extremities, no obvious sensory deficits    HOSPITAL MEDICATIONS:  MEDICATIONS  (STANDING):  acetaminophen   IVPB .. 675 milliGRAM(s) IV Intermittent once  dicyclomine 10 milliGRAM(s) Oral before breakfast  NIFEdipine XL 60 milliGRAM(s) Oral daily  pantoprazole    Tablet 40 milliGRAM(s) Oral before breakfast  sertraline 50 milliGRAM(s) Oral daily    MEDICATIONS  (PRN):  acetaminophen     Tablet .. 650 milliGRAM(s) Oral every 6 hours PRN Temp greater or equal to 38.5C (101.3F)  ketorolac   Injectable 15 milliGRAM(s) IV Push every 8 hours PRN mild to moderate pain  loperamide 2 milliGRAM(s) Oral every 4 hours PRN Diarrhea  ondansetron Injectable 4 milliGRAM(s) IV Push every 6 hours PRN Nausea and/or Vomiting  oxycodone    5 mG/acetaminophen 325 mG 1 Tablet(s) Oral daily PRN Moderate Pain (4 - 6)  simethicone 80 milliGRAM(s) Chew three times a day PRN Gas      LABS:                        11.0   8.92  )-----------( 363      ( 10 Jul 2022 14:39 )             34.6     07-10    142  |  108  |  13  ----------------------------<  75  3.4<L>   |  20<L>  |  0.97    Ca    8.5      10 Jul 2022 14:39    TPro  7.8  /  Alb  3.8  /  TBili  0.6  /  DBili  x   /  AST  31  /  ALT  45  /  AlkPhos  143<H>  07-10    PT/INR - ( 10 Jul 2022 14:39 )   PT: 13.9 sec;   INR: 1.21 ratio         PTT - ( 10 Jul 2022 14:39 )  PTT:28.0 sec      Venous Blood Gas:  07-10 @ 21:00  7.33/43/34/23/50.6  VBG Lactate: 1.4  Venous Blood Gas:  07-10 @ 14:20  7.39/36/42/22/68.0  VBG Lactate: 1.1      MICROBIOLOGY:     RADIOLOGY:  < from: CT Abdomen and Pelvis w/ IV Cont (07.10.22 @ 17:39) >  IMPRESSION:    No pulmonary embolism detected.    Multifocal bilateral lung consolidation likely representing pneumonia.    --- End of Report ---    < end of copied text >      EKG:

## 2022-07-12 ENCOUNTER — APPOINTMENT (OUTPATIENT)
Dept: PULMONOLOGY | Facility: CLINIC | Age: 52
End: 2022-07-12

## 2022-07-12 LAB
ALBUMIN SERPL ELPH-MCNC: 3.1 G/DL — LOW (ref 3.3–5)
ALP SERPL-CCNC: 151 U/L — HIGH (ref 40–120)
ALT FLD-CCNC: 50 U/L — HIGH (ref 10–45)
ANION GAP SERPL CALC-SCNC: 13 MMOL/L — SIGNIFICANT CHANGE UP (ref 5–17)
ANION GAP SERPL CALC-SCNC: 16 MMOL/L — SIGNIFICANT CHANGE UP (ref 5–17)
APPEARANCE UR: ABNORMAL
APTT BLD: 30.5 SEC — SIGNIFICANT CHANGE UP (ref 27.5–35.5)
AST SERPL-CCNC: 66 U/L — HIGH (ref 10–40)
B PERT IGG+IGM PNL SER: ABNORMAL
BACTERIA # UR AUTO: ABNORMAL
BASE EXCESS BLDV CALC-SCNC: -7.6 MMOL/L — LOW (ref -2–2)
BILIRUB SERPL-MCNC: 0.8 MG/DL — SIGNIFICANT CHANGE UP (ref 0.2–1.2)
BILIRUB UR-MCNC: NEGATIVE — SIGNIFICANT CHANGE UP
BUN SERPL-MCNC: 22 MG/DL — SIGNIFICANT CHANGE UP (ref 7–23)
BUN SERPL-MCNC: 23 MG/DL — SIGNIFICANT CHANGE UP (ref 7–23)
CA-I SERPL-SCNC: 1.15 MMOL/L — SIGNIFICANT CHANGE UP (ref 1.15–1.33)
CALCIUM SERPL-MCNC: 8 MG/DL — LOW (ref 8.4–10.5)
CALCIUM SERPL-MCNC: 8.3 MG/DL — LOW (ref 8.4–10.5)
CHLORIDE BLDV-SCNC: 103 MMOL/L — SIGNIFICANT CHANGE UP (ref 96–108)
CHLORIDE SERPL-SCNC: 104 MMOL/L — SIGNIFICANT CHANGE UP (ref 96–108)
CHLORIDE SERPL-SCNC: 104 MMOL/L — SIGNIFICANT CHANGE UP (ref 96–108)
CO2 BLDV-SCNC: 21 MMOL/L — LOW (ref 22–26)
CO2 SERPL-SCNC: 16 MMOL/L — LOW (ref 22–31)
CO2 SERPL-SCNC: 19 MMOL/L — LOW (ref 22–31)
COLOR FLD: SIGNIFICANT CHANGE UP
COLOR SPEC: YELLOW — SIGNIFICANT CHANGE UP
COMMENT - URINE: SIGNIFICANT CHANGE UP
CREAT SERPL-MCNC: 1.2 MG/DL — SIGNIFICANT CHANGE UP (ref 0.5–1.3)
CREAT SERPL-MCNC: 1.21 MG/DL — SIGNIFICANT CHANGE UP (ref 0.5–1.3)
DIFF PNL FLD: ABNORMAL
EGFR: 54 ML/MIN/1.73M2 — LOW
EGFR: 54 ML/MIN/1.73M2 — LOW
EPI CELLS # UR: 1 — SIGNIFICANT CHANGE UP
FLUID INTAKE SUBSTANCE CLASS: SIGNIFICANT CHANGE UP
FOLATE+VIT B12 SERBLD-IMP: 2 % — SIGNIFICANT CHANGE UP
GAS PNL BLDA: SIGNIFICANT CHANGE UP
GAS PNL BLDV: 133 MMOL/L — LOW (ref 136–145)
GAS PNL BLDV: SIGNIFICANT CHANGE UP
GLUCOSE BLDC GLUCOMTR-MCNC: 133 MG/DL — HIGH (ref 70–99)
GLUCOSE BLDC GLUCOMTR-MCNC: 190 MG/DL — HIGH (ref 70–99)
GLUCOSE BLDV-MCNC: 241 MG/DL — HIGH (ref 70–99)
GLUCOSE SERPL-MCNC: 188 MG/DL — HIGH (ref 70–99)
GLUCOSE SERPL-MCNC: 248 MG/DL — HIGH (ref 70–99)
GLUCOSE UR QL: NEGATIVE — SIGNIFICANT CHANGE UP
GRAM STN FLD: SIGNIFICANT CHANGE UP
GRAM STN FLD: SIGNIFICANT CHANGE UP
GRAN CASTS # UR COMP ASSIST: 1 /LPF — SIGNIFICANT CHANGE UP
HCO3 BLDV-SCNC: 20 MMOL/L — LOW (ref 22–29)
HCT VFR BLD CALC: 30.3 % — LOW (ref 34.5–45)
HCT VFR BLD CALC: 31.5 % — LOW (ref 34.5–45)
HCT VFR BLDA CALC: 31 % — LOW (ref 34.5–46.5)
HGB BLD CALC-MCNC: 10.3 G/DL — LOW (ref 11.7–16.1)
HGB BLD-MCNC: 9.4 G/DL — LOW (ref 11.5–15.5)
HGB BLD-MCNC: 9.6 G/DL — LOW (ref 11.5–15.5)
HYALINE CASTS # UR AUTO: 1 /LPF — SIGNIFICANT CHANGE UP (ref 0–7)
INR BLD: 1.31 RATIO — HIGH (ref 0.88–1.16)
KETONES UR-MCNC: SIGNIFICANT CHANGE UP
LACTATE BLDV-MCNC: 1 MMOL/L — SIGNIFICANT CHANGE UP (ref 0.7–2)
LEGIONELLA AG UR QL: NEGATIVE — SIGNIFICANT CHANGE UP
LEUKOCYTE ESTERASE UR-ACNC: NEGATIVE — SIGNIFICANT CHANGE UP
LYMPHOCYTES # FLD: 8 % — SIGNIFICANT CHANGE UP
MAGNESIUM SERPL-MCNC: 2 MG/DL — SIGNIFICANT CHANGE UP (ref 1.6–2.6)
MAGNESIUM SERPL-MCNC: 2.1 MG/DL — SIGNIFICANT CHANGE UP (ref 1.6–2.6)
MCHC RBC-ENTMCNC: 28.5 PG — SIGNIFICANT CHANGE UP (ref 27–34)
MCHC RBC-ENTMCNC: 29.3 PG — SIGNIFICANT CHANGE UP (ref 27–34)
MCHC RBC-ENTMCNC: 30.5 GM/DL — LOW (ref 32–36)
MCHC RBC-ENTMCNC: 31 GM/DL — LOW (ref 32–36)
MCV RBC AUTO: 91.8 FL — SIGNIFICANT CHANGE UP (ref 80–100)
MCV RBC AUTO: 96 FL — SIGNIFICANT CHANGE UP (ref 80–100)
MONOS+MACROS # FLD: 20 % — SIGNIFICANT CHANGE UP
MRSA PCR RESULT.: SIGNIFICANT CHANGE UP
NEUTROPHILS-BODY FLUID: 70 % — SIGNIFICANT CHANGE UP
NITRITE UR-MCNC: NEGATIVE — SIGNIFICANT CHANGE UP
NRBC # BLD: 0 /100 WBCS — SIGNIFICANT CHANGE UP (ref 0–0)
NRBC # BLD: 0 /100 WBCS — SIGNIFICANT CHANGE UP (ref 0–0)
PCO2 BLDV: 47 MMHG — HIGH (ref 39–42)
PH BLDV: 7.23 — LOW (ref 7.32–7.43)
PH UR: 6 — SIGNIFICANT CHANGE UP (ref 5–8)
PHOSPHATE SERPL-MCNC: 3 MG/DL — SIGNIFICANT CHANGE UP (ref 2.5–4.5)
PHOSPHATE SERPL-MCNC: 3.7 MG/DL — SIGNIFICANT CHANGE UP (ref 2.5–4.5)
PLATELET # BLD AUTO: 345 K/UL — SIGNIFICANT CHANGE UP (ref 150–400)
PLATELET # BLD AUTO: 372 K/UL — SIGNIFICANT CHANGE UP (ref 150–400)
PO2 BLDV: 49 MMHG — HIGH (ref 25–45)
POTASSIUM BLDV-SCNC: 4.2 MMOL/L — SIGNIFICANT CHANGE UP (ref 3.5–5.1)
POTASSIUM SERPL-MCNC: 3.7 MMOL/L — SIGNIFICANT CHANGE UP (ref 3.5–5.3)
POTASSIUM SERPL-MCNC: 4.3 MMOL/L — SIGNIFICANT CHANGE UP (ref 3.5–5.3)
POTASSIUM SERPL-SCNC: 3.7 MMOL/L — SIGNIFICANT CHANGE UP (ref 3.5–5.3)
POTASSIUM SERPL-SCNC: 4.3 MMOL/L — SIGNIFICANT CHANGE UP (ref 3.5–5.3)
PROT SERPL-MCNC: 7.2 G/DL — SIGNIFICANT CHANGE UP (ref 6–8.3)
PROT UR-MCNC: 100 — SIGNIFICANT CHANGE UP
PROTHROM AB SERPL-ACNC: 15.2 SEC — HIGH (ref 10.5–13.4)
RBC # BLD: 3.28 M/UL — LOW (ref 3.8–5.2)
RBC # BLD: 3.3 M/UL — LOW (ref 3.8–5.2)
RBC # FLD: 14.2 % — SIGNIFICANT CHANGE UP (ref 10.3–14.5)
RBC # FLD: 14.4 % — SIGNIFICANT CHANGE UP (ref 10.3–14.5)
RBC CASTS # UR COMP ASSIST: 2 /HPF — SIGNIFICANT CHANGE UP (ref 0–4)
RCV VOL RI: 3000 /UL — HIGH (ref 0–0)
S AUREUS DNA NOSE QL NAA+PROBE: DETECTED
SAO2 % BLDV: 75.3 % — SIGNIFICANT CHANGE UP (ref 67–88)
SODIUM SERPL-SCNC: 136 MMOL/L — SIGNIFICANT CHANGE UP (ref 135–145)
SODIUM SERPL-SCNC: 136 MMOL/L — SIGNIFICANT CHANGE UP (ref 135–145)
SP GR SPEC: 1.03 — HIGH (ref 1.01–1.02)
SPECIMEN SOURCE: SIGNIFICANT CHANGE UP
SPECIMEN SOURCE: SIGNIFICANT CHANGE UP
TOTAL NUCLEATED CELL COUNT, BODY FLUID: 3411 /UL — SIGNIFICANT CHANGE UP
TUBE TYPE: SIGNIFICANT CHANGE UP
UROBILINOGEN FLD QL: NEGATIVE — SIGNIFICANT CHANGE UP
WBC # BLD: 10.67 K/UL — HIGH (ref 3.8–10.5)
WBC # BLD: 12.22 K/UL — HIGH (ref 3.8–10.5)
WBC # FLD AUTO: 10.67 K/UL — HIGH (ref 3.8–10.5)
WBC # FLD AUTO: 12.22 K/UL — HIGH (ref 3.8–10.5)
WBC UR QL: 2 /HPF — SIGNIFICANT CHANGE UP (ref 0–5)

## 2022-07-12 PROCEDURE — 99292 CRITICAL CARE ADDL 30 MIN: CPT | Mod: GC,25

## 2022-07-12 PROCEDURE — 31624 DX BRONCHOSCOPE/LAVAGE: CPT | Mod: GC,59

## 2022-07-12 PROCEDURE — 93308 TTE F-UP OR LMTD: CPT | Mod: 26,GC

## 2022-07-12 PROCEDURE — 71045 X-RAY EXAM CHEST 1 VIEW: CPT | Mod: 26

## 2022-07-12 PROCEDURE — 99291 CRITICAL CARE FIRST HOUR: CPT | Mod: GC,25

## 2022-07-12 PROCEDURE — 76604 US EXAM CHEST: CPT | Mod: 26,GC

## 2022-07-12 RX ORDER — POLYETHYLENE GLYCOL 3350 17 G/17G
17 POWDER, FOR SOLUTION ORAL DAILY
Refills: 0 | Status: DISCONTINUED | OUTPATIENT
Start: 2022-07-12 | End: 2022-07-12

## 2022-07-12 RX ORDER — ACETYLCYSTEINE 200 MG/ML
5 VIAL (ML) MISCELLANEOUS ONCE
Refills: 0 | Status: DISCONTINUED | OUTPATIENT
Start: 2022-07-12 | End: 2022-07-12

## 2022-07-12 RX ORDER — MIDAZOLAM HYDROCHLORIDE 1 MG/ML
0.02 INJECTION, SOLUTION INTRAMUSCULAR; INTRAVENOUS
Qty: 100 | Refills: 0 | Status: DISCONTINUED | OUTPATIENT
Start: 2022-07-12 | End: 2022-07-13

## 2022-07-12 RX ORDER — PANTOPRAZOLE SODIUM 20 MG/1
40 TABLET, DELAYED RELEASE ORAL DAILY
Refills: 0 | Status: DISCONTINUED | OUTPATIENT
Start: 2022-07-12 | End: 2022-07-19

## 2022-07-12 RX ORDER — SENNA PLUS 8.6 MG/1
5 TABLET ORAL AT BEDTIME
Refills: 0 | Status: DISCONTINUED | OUTPATIENT
Start: 2022-07-12 | End: 2022-07-17

## 2022-07-12 RX ORDER — POLYETHYLENE GLYCOL 3350 17 G/17G
17 POWDER, FOR SOLUTION ORAL DAILY
Refills: 0 | Status: DISCONTINUED | OUTPATIENT
Start: 2022-07-12 | End: 2022-07-13

## 2022-07-12 RX ORDER — POTASSIUM CHLORIDE 20 MEQ
10 PACKET (EA) ORAL
Refills: 0 | Status: COMPLETED | OUTPATIENT
Start: 2022-07-12 | End: 2022-07-12

## 2022-07-12 RX ORDER — HEPARIN SODIUM 5000 [USP'U]/ML
7500 INJECTION INTRAVENOUS; SUBCUTANEOUS EVERY 8 HOURS
Refills: 0 | Status: DISCONTINUED | OUTPATIENT
Start: 2022-07-12 | End: 2022-07-23

## 2022-07-12 RX ORDER — FENTANYL CITRATE 50 UG/ML
100 INJECTION INTRAVENOUS ONCE
Refills: 0 | Status: DISCONTINUED | OUTPATIENT
Start: 2022-07-12 | End: 2022-07-12

## 2022-07-12 RX ORDER — SENNA PLUS 8.6 MG/1
5 TABLET ORAL AT BEDTIME
Refills: 0 | Status: DISCONTINUED | OUTPATIENT
Start: 2022-07-12 | End: 2022-07-12

## 2022-07-12 RX ORDER — ACETAMINOPHEN 500 MG
1000 TABLET ORAL EVERY 6 HOURS
Refills: 0 | Status: COMPLETED | OUTPATIENT
Start: 2022-07-12 | End: 2022-07-12

## 2022-07-12 RX ORDER — NALOXEGOL OXALATE 12.5 MG/1
25 TABLET, FILM COATED ORAL DAILY
Refills: 0 | Status: DISCONTINUED | OUTPATIENT
Start: 2022-07-12 | End: 2022-07-17

## 2022-07-12 RX ORDER — MIDAZOLAM HYDROCHLORIDE 1 MG/ML
4 INJECTION, SOLUTION INTRAMUSCULAR; INTRAVENOUS
Refills: 0 | Status: DISCONTINUED | OUTPATIENT
Start: 2022-07-12 | End: 2022-07-13

## 2022-07-12 RX ORDER — CISATRACURIUM BESYLATE 2 MG/ML
20 INJECTION INTRAVENOUS ONCE
Refills: 0 | Status: COMPLETED | OUTPATIENT
Start: 2022-07-12 | End: 2022-07-12

## 2022-07-12 RX ORDER — SERTRALINE 25 MG/1
50 TABLET, FILM COATED ORAL DAILY
Refills: 0 | Status: DISCONTINUED | OUTPATIENT
Start: 2022-07-12 | End: 2022-07-12

## 2022-07-12 RX ORDER — SERTRALINE 25 MG/1
50 TABLET, FILM COATED ORAL DAILY
Refills: 0 | Status: DISCONTINUED | OUTPATIENT
Start: 2022-07-12 | End: 2022-07-17

## 2022-07-12 RX ORDER — MIDAZOLAM HYDROCHLORIDE 1 MG/ML
4 INJECTION, SOLUTION INTRAMUSCULAR; INTRAVENOUS ONCE
Refills: 0 | Status: DISCONTINUED | OUTPATIENT
Start: 2022-07-12 | End: 2022-07-12

## 2022-07-12 RX ORDER — FENTANYL CITRATE 50 UG/ML
50 INJECTION INTRAVENOUS
Refills: 0 | Status: DISCONTINUED | OUTPATIENT
Start: 2022-07-12 | End: 2022-07-14

## 2022-07-12 RX ORDER — CISATRACURIUM BESYLATE 2 MG/ML
3 INJECTION INTRAVENOUS
Qty: 200 | Refills: 0 | Status: DISCONTINUED | OUTPATIENT
Start: 2022-07-12 | End: 2022-07-14

## 2022-07-12 RX ORDER — PROPOFOL 10 MG/ML
10 INJECTION, EMULSION INTRAVENOUS
Qty: 1000 | Refills: 0 | Status: DISCONTINUED | OUTPATIENT
Start: 2022-07-12 | End: 2022-07-15

## 2022-07-12 RX ORDER — PANTOPRAZOLE SODIUM 20 MG/1
40 TABLET, DELAYED RELEASE ORAL
Refills: 0 | Status: DISCONTINUED | OUTPATIENT
Start: 2022-07-12 | End: 2022-07-12

## 2022-07-12 RX ADMIN — Medication 25.4 MICROGRAM(S)/KG/MIN: at 19:26

## 2022-07-12 RX ADMIN — NALOXEGOL OXALATE 25 MILLIGRAM(S): 12.5 TABLET, FILM COATED ORAL at 12:00

## 2022-07-12 RX ADMIN — Medication 1000 MILLIGRAM(S): at 11:37

## 2022-07-12 RX ADMIN — Medication 650 MILLIGRAM(S): at 01:40

## 2022-07-12 RX ADMIN — FENTANYL CITRATE 50 MICROGRAM(S): 50 INJECTION INTRAVENOUS at 06:34

## 2022-07-12 RX ADMIN — Medication 100 MILLIEQUIVALENT(S): at 13:20

## 2022-07-12 RX ADMIN — Medication 100 MILLIEQUIVALENT(S): at 16:29

## 2022-07-12 RX ADMIN — FENTANYL CITRATE 100 MICROGRAM(S): 50 INJECTION INTRAVENOUS at 23:07

## 2022-07-12 RX ADMIN — POLYETHYLENE GLYCOL 3350 17 GRAM(S): 17 POWDER, FOR SOLUTION ORAL at 11:37

## 2022-07-12 RX ADMIN — CHLORHEXIDINE GLUCONATE 15 MILLILITER(S): 213 SOLUTION TOPICAL at 18:18

## 2022-07-12 RX ADMIN — Medication 100 MILLIEQUIVALENT(S): at 15:08

## 2022-07-12 RX ADMIN — SENNA PLUS 5 MILLILITER(S): 8.6 TABLET ORAL at 21:11

## 2022-07-12 RX ADMIN — Medication 650 MILLIGRAM(S): at 01:10

## 2022-07-12 RX ADMIN — Medication 1000 MILLIGRAM(S): at 13:02

## 2022-07-12 RX ADMIN — MIDAZOLAM HYDROCHLORIDE 4 MILLIGRAM(S): 1 INJECTION, SOLUTION INTRAMUSCULAR; INTRAVENOUS at 06:46

## 2022-07-12 RX ADMIN — Medication 1000 MILLIGRAM(S): at 19:03

## 2022-07-12 RX ADMIN — ENOXAPARIN SODIUM 40 MILLIGRAM(S): 100 INJECTION SUBCUTANEOUS at 05:22

## 2022-07-12 RX ADMIN — SERTRALINE 50 MILLIGRAM(S): 25 TABLET, FILM COATED ORAL at 12:00

## 2022-07-12 RX ADMIN — Medication 250 MILLIGRAM(S): at 11:37

## 2022-07-12 RX ADMIN — CISATRACURIUM BESYLATE 30.5 MICROGRAM(S)/KG/MIN: 2 INJECTION INTRAVENOUS at 19:26

## 2022-07-12 RX ADMIN — PIPERACILLIN AND TAZOBACTAM 25 GRAM(S): 4; .5 INJECTION, POWDER, LYOPHILIZED, FOR SOLUTION INTRAVENOUS at 07:59

## 2022-07-12 RX ADMIN — PROPOFOL 10.2 MICROGRAM(S)/KG/MIN: 10 INJECTION, EMULSION INTRAVENOUS at 19:25

## 2022-07-12 RX ADMIN — HEPARIN SODIUM 7500 UNIT(S): 5000 INJECTION INTRAVENOUS; SUBCUTANEOUS at 13:19

## 2022-07-12 RX ADMIN — CISATRACURIUM BESYLATE 20 MILLIGRAM(S): 2 INJECTION INTRAVENOUS at 06:40

## 2022-07-12 RX ADMIN — Medication 1000 MILLIGRAM(S): at 18:18

## 2022-07-12 RX ADMIN — Medication 4: at 05:56

## 2022-07-12 RX ADMIN — Medication 25.4 MICROGRAM(S)/KG/MIN: at 00:19

## 2022-07-12 RX ADMIN — Medication 2: at 12:00

## 2022-07-12 RX ADMIN — CISATRACURIUM BESYLATE 30.5 MICROGRAM(S)/KG/MIN: 2 INJECTION INTRAVENOUS at 07:59

## 2022-07-12 RX ADMIN — CHLORHEXIDINE GLUCONATE 1 APPLICATION(S): 213 SOLUTION TOPICAL at 05:10

## 2022-07-12 RX ADMIN — Medication 4: at 00:20

## 2022-07-12 RX ADMIN — Medication 650 MILLIGRAM(S): at 09:08

## 2022-07-12 RX ADMIN — HEPARIN SODIUM 7500 UNIT(S): 5000 INJECTION INTRAVENOUS; SUBCUTANEOUS at 21:11

## 2022-07-12 RX ADMIN — CHLORHEXIDINE GLUCONATE 15 MILLILITER(S): 213 SOLUTION TOPICAL at 05:10

## 2022-07-12 RX ADMIN — FENTANYL CITRATE 16.9 MICROGRAM(S)/KG/HR: 50 INJECTION INTRAVENOUS at 19:25

## 2022-07-12 RX ADMIN — FENTANYL CITRATE 50 MICROGRAM(S): 50 INJECTION INTRAVENOUS at 05:53

## 2022-07-12 RX ADMIN — PIPERACILLIN AND TAZOBACTAM 25 GRAM(S): 4; .5 INJECTION, POWDER, LYOPHILIZED, FOR SOLUTION INTRAVENOUS at 17:23

## 2022-07-12 RX ADMIN — Medication 650 MILLIGRAM(S): at 07:32

## 2022-07-12 NOTE — DIETITIAN INITIAL EVALUATION ADULT - PERTINENT LABORATORY DATA
07-12    136  |  104  |  23  ----------------------------<  248<H>  4.3   |  16<L>  |  1.21    Ca    8.0<L>      12 Jul 2022 00:16  Phos  3.7     07-12  Mg     2.0     07-12    TPro  7.5  /  Alb  3.6  /  TBili  1.0  /  DBili  x   /  AST  51<H>  /  ALT  40  /  AlkPhos  145<H>  07-11  POCT Blood Glucose.: 113 mg/dL (07-11-22 @ 12:08)

## 2022-07-12 NOTE — DIETITIAN INITIAL EVALUATION ADULT - OTHER INFO
Wt Hx: Dosing wt 169.2 kG (373.1 lbs). RD obtained bed scale wt 408.6 lbs (7/12/22 - however pt with numerous objects on bed + edema). Wt Hx per RD notes - lbs: 272.1 (7/13/20), 311.1 (3/8/21). Pt with 62 lb wt gain x~1.5 years. RD will continue to trend as new wts available/able.     Nutrition-Related Concerns:   - Intubated 7/11. On Versed and propofol at 40.6 mL/hr, at current rate x24 hrs to provide ~1,072 kcals.  - Pressors: norepinephrine at 15.9 mL/hr   - Hx of T2DM with elevated BG on sliding scale of insulin

## 2022-07-12 NOTE — PROCEDURE NOTE - NSBRONCHPROCDETAILS_GEN_A_CORE_FT
Bronchoscope inserted through ETT. ETT noted to be in good position. Airways examined to the subsegments.  Airway evaluation revealed erythematous mucosa. BAL performed in the right middle lobe with marked return and purulent sputum.  No endobronchial lesions noted. Bronchoscope then withdrawn from ETT.    Patient tolerated procedure well.

## 2022-07-12 NOTE — DIETITIAN INITIAL EVALUATION ADULT - ENERGY INTAKE
Pt was NPO 7/10-> 7/12. Currently ordered for Glucerna 1.2 at goal rate 70 mL/hr x18 hrs. Pt has yet to receive EN per MICU 18 hr protocol.

## 2022-07-12 NOTE — DIETITIAN NUTRITION RISK NOTIFICATION - TREATMENT: THE FOLLOWING DIET HAS BEEN RECOMMENDED
Diet, NPO with Tube Feed:   Tube Feeding Modality: Orogastric  Glucerna 1.2 Maldonado (GLUCERNARTH)  Total Volume for 24 Hours (mL): 1260  Intermittent  Starting Tube Feed Rate {mL per Hour}: 10  Increase Tube Feed Rate by (mL): 10    Every hour  Until Goal Tube Feed Rate (mL per Hour): 70  Tube Feeding Hours ON: 18  Tube Feeding OFF (Hours): 6  Tube Feed Start Time: 09:00  Free Water Flush   Total Volume per Flush (mL): 200   Frequency: Every 6 Hours (07-12-22 @ 08:15) [Active]

## 2022-07-12 NOTE — DIETITIAN INITIAL EVALUATION ADULT - PERTINENT MEDS FT
MEDICATIONS  (STANDING):  azithromycin  IVPB 500 milliGRAM(s) IV Intermittent every 24 hours  chlorhexidine 0.12% Liquid 15 milliLiter(s) Oral Mucosa every 12 hours  chlorhexidine 4% Liquid 1 Application(s) Topical <User Schedule>  cisatracurium Infusion 3 MICROgram(s)/kG/Min (30.5 mL/Hr) IV Continuous <Continuous>  enoxaparin Injectable 40 milliGRAM(s) SubCutaneous every 24 hours  fentaNYL   Infusion... 2 MICROgram(s)/kG/Hr (16.9 mL/Hr) IV Continuous <Continuous>  glucagon  Injectable 1 milliGRAM(s) IntraMuscular once  insulin lispro (ADMELOG) corrective regimen sliding scale   SubCutaneous every 6 hours  midazolam Infusion 0.02 mG/kG/Hr (3.39 mL/Hr) IV Continuous <Continuous>  norepinephrine Infusion 0.08 MICROgram(s)/kG/Min (25.4 mL/Hr) IV Continuous <Continuous>  pantoprazole    Tablet 40 milliGRAM(s) Oral before breakfast  piperacillin/tazobactam IVPB.. 3.375 Gram(s) IV Intermittent every 8 hours  polyethylene glycol 3350 17 Gram(s) Oral daily  propofol Infusion 10 MICROgram(s)/kG/Min (10.2 mL/Hr) IV Continuous <Continuous>  rocuronium Injectable 30 milliGRAM(s) IV Push once  senna Syrup 5 milliLiter(s) Oral at bedtime  sertraline 50 milliGRAM(s) Oral daily  vancomycin  IVPB 1000 milliGRAM(s) IV Intermittent every 12 hours    MEDICATIONS  (PRN):  acetaminophen    Suspension .. 650 milliGRAM(s) Oral every 6 hours PRN Temp greater or equal to 38C (100.4F), Moderate Pain (4 - 6)  fentaNYL    Injectable 50 MICROGram(s) IV Push every 15 minutes PRN uptitration

## 2022-07-12 NOTE — PROGRESS NOTE ADULT - CRITICAL CARE ATTENDING COMMENT
Attending Attestation:    Patient seen and examined with resident/fellow.  Agree with above except as noted.  53 yo female morbidly obese, with chronic  back pain syndrome ,s/p spinal pain stimulator 2 days prior to admission, now with acute hypoxemic respiratory failure due to aspiration pneumonia.    1. Acute hypoxemic respiratory failure due to aspiration pneumonia. S/P spinal stimulator insertion done under conscious sedation at  [facility] 2 days prior to admission.  CT chest shows bilateral R> L  multilobar consolidation.  PT  intubated and placed on mechanical ventilation last night. Currently sedated and medically paralyzed. Pt in ARDS with P/F< 100.         1. Prone parient         2. Continue Nimbex with goal TOF 3-4/4         3. Continue Fentanyl and Propofol. Stop Versed drip.         4.Continue with Vancomycin and Zosyn for aspiration pneumonia. Azithromycin for 3 days of 500mg. total. MRSA nasal swab positive.         5. Pt still febrile despite antibiotics x 48 hrs and Tylenol. Will perform bronchoscopy before proning.   2. JUDD. Pt on pressors . Likely ATN . Follow UO and creatinine.  3. DVT prophylaxis   Change to sq heparin with increasing creatinine.  4. GOC. Full code                  This patient is critically ill and required frequent bedside visits with therapy change.  Total critical care time spent was 35__ minutes.

## 2022-07-12 NOTE — DIETITIAN INITIAL EVALUATION ADULT - NSFNSNUTROBTAINREPAIRFT_GEN_A_CORE
Pt with Hx of being seen by RDs for food insecurity, unknown if problem persists. RD to make  aware.

## 2022-07-12 NOTE — CHART NOTE - NSCHARTNOTEFT_GEN_A_CORE
:  Sim Betancourt MD  INDICATION:  Respiratory failure  PROCEDURE:  [ x] LIMITED ECHO  [x ] LIMITED CHEST  [ ] LIMITED RETROPERITONEAL  [ ] LIMITED ABDOMINAL  [ ] LIMITED DVT  [ ] NEEDLE GUIDANCE VASCULAR  [ ] NEEDLE GUIDANCE THORACENTESIS  [ ] NEEDLE GUIDANCE PARACENTESIS  [ ] NEEDLE GUIDANCE PERICARDIOCENTESIS  [ ] OTHER    FINDINGS: CHEST: Bilateral B lines anteriorly R> L. Bilateral lower lobe consolidation L>R. No pleural effusions.                  ECHO: Difficult exam. Normal LVF. RV< LV. IVC 2.0.         INTERPRETATION: 1. Bilateral  pneumonia. 2. Normal LV systolic function.

## 2022-07-12 NOTE — PROCEDURE NOTE - NSBRONCHPROCPERFBY_GEN_ALL_CORE_FT
NOTES FOR VISIT:   · Patient's dose of isotretinoin will be increased to 90 mg p.o. q. day. She should take 3 of the 30 mg capsules to total this dose. She was reminded to take her medication with food. She does need to continue HER-2 forms of birth control which are the birth control pill and abstinence. She was reminded not to share her medication or donate blood. Her counseling was confirmed on the I pledge system. She will follow-up in 31+ days    IMAGES:  No images are attached to the encounter.    FOLLOW-UP: Return in about 4 weeks (around 10/2/2018).    Ana was seen today for derm problem.    Diagnoses and all orders for this visit:    Acne vulgaris, on isotretinoin week 4 of 22  -     isotretinoin 30 MG capsule; 3 cap po daily.  Take with largest meal of the day when possible. iPledge #[id number]         Medications Discontinued During This Encounter   Medication Reason   • amoxicillin-clavulanate (AUGMENTIN) 875-125 MG per tablet Therapy Completed   • azithromycin (ZITHROMAX) 250 MG tablet Therapy Completed        Chief Complaint   Patient presents with   • Derm Problem     follow up isotretinoin       HISTORY: Ana is here today for:  · Patient is being seen today for follow-up for isotretinoin. She's week 4 of a 22 week course. She states she has noted some mild improvement of her acne. She has noticed dryness of her face, lips, arms, and hands. This is relieved with over-the-counter moisturizers and Chapstick. She denies any muscle or joint pain. She does feel that she's been more deng than usual but denies any major depression or thoughts of hurting herself. She does not share her medicine or donate blood.  · She is a college student at [employer].    PAST DERMATOLOGY-SPECIFIC HISTORY:  Acne vulgaris    PAST MEDICAL HISTORY, PAST SURGICAL HISTORY, SOCIAL HISTORY, AND FAMILY HISTORY WERE REVIEWED.    PHYSICAL EXAMINATION: CONSTITUTIONAL:  Ana  is a 20 year old female who is well developed,  Elfego well nourished, in no acute distress.    Visit Vitals  Wt 84 kg   . NEUROLOGIC AND PSYCHIATRIC: She has a normal mood and affect. SKIN: Complete examination, including palpation and careful visual examination of the area of interest revealed no other significant abnormality or eruption. I noted:    · Patients face was examined. She does have open and closed comedones scattered on her face. She is approximately 15 inflammatory papules measuring 2 mm on her cheeks bilaterally.  · She answers questions appropriately and does not appear depressed in the office  · Patient had a negative urine pregnancy test today    Augusta Bradley, KITTYP-BC, JAZMINNP

## 2022-07-12 NOTE — PROGRESS NOTE ADULT - SUBJECTIVE AND OBJECTIVE BOX
INTERVAL HPI/OVERNIGHT EVENTS:    SUBJECTIVE: Patient seen and examined at bedside.       VITAL SIGNS:  ICU Vital Signs Last 24 Hrs  T(C): 38 (2022 04:00), Max: 39.4 (2022 06:08)  T(F): 100.4 (2022 04:00), Max: 103 (2022 06:08)  HR: 68 (2022 05:45) (62 - 96)  BP: 148/65 (2022 20:00) (92/46 - 162/70)  BP(mean): 94 (2022 20:00) (67 - 101)  ABP: 121/55 (2022 05:45) (103/49 - 162/69)  ABP(mean): 75 (2022 05:45) (67 - 100)  RR: 29 (2022 05:45) (19 - 46)  SpO2: 87% (2022 05:45) (81% - 97%)    O2 Parameters below as of 2022 20:00  Patient On (Oxygen Delivery Method): ventilator    O2 Concentration (%): 100      Mode: AC/ CMV (Assist Control/ Continuous Mandatory Ventilation), RR (machine): 24, TV (machine): 500, FiO2: 100, PEEP: 15, ITime: 1, MAP: 21, PIP: 36  Plateau pressure:   P/F ratio:     07-11 @ 07:01  -  07-12 @ 05:57  --------------------------------------------------------  IN: 1443.2 mL / OUT: 685 mL / NET: 758.2 mL      CAPILLARY BLOOD GLUCOSE      POCT Blood Glucose.: 113 mg/dL (2022 12:08)      ECG:    PHYSICAL EXAM:    GENERAL: intubated, sedated   HEAD:  Atraumatic. Normocephalic.  EYES: EOMI. PERRLA. Normal conjunctiva/sclera.  ENT: Neck supple. No JVD. Moist oral mucosa. Not edentulous. No thrush.  LYMPH: Normal supraclavicular/cervical lymph nodes.   CARDIAC: +distance heart sounds. +tachy, Not maryuri. Regular rhythm. Not irregularly irregular. S1. S2.   LUNG/CHEST: CTAB. BS equal bilaterally. No wheezes. No rales. No rhonchi.  ABDOMEN: Soft. No tenderness. No distension. No fluid wave. Normal bowel sounds.  BACK: No midline/vertebral tenderness. No flank tenderness. battery pack and bloody tape in thoracic midline of spine  VASCULAR: +2 b/l radial or ulnar pulses. Palpable DP pulses.  EXTREMITIES:  No clubbing. No cyanosis. +2 b/l LE nonpitting edema. Moving all 4.  NEUROLOGY: Non-focal exam.      MEDICATIONS:  MEDICATIONS  (STANDING):  azithromycin  IVPB 500 milliGRAM(s) IV Intermittent every 24 hours  chlorhexidine 0.12% Liquid 15 milliLiter(s) Oral Mucosa every 12 hours  chlorhexidine 4% Liquid 1 Application(s) Topical <User Schedule>  dicyclomine 10 milliGRAM(s) Oral before breakfast  enoxaparin Injectable 40 milliGRAM(s) SubCutaneous every 24 hours  fentaNYL   Infusion... 2 MICROgram(s)/kG/Hr (16.9 mL/Hr) IV Continuous <Continuous>  glucagon  Injectable 1 milliGRAM(s) IntraMuscular once  insulin lispro (ADMELOG) corrective regimen sliding scale   SubCutaneous every 6 hours  norepinephrine Infusion 0.08 MICROgram(s)/kG/Min (25.4 mL/Hr) IV Continuous <Continuous>  piperacillin/tazobactam IVPB.. 3.375 Gram(s) IV Intermittent every 8 hours  propofol Infusion 10 MICROgram(s)/kG/Min (10.2 mL/Hr) IV Continuous <Continuous>  rocuronium Injectable 30 milliGRAM(s) IV Push once  vancomycin  IVPB 1000 milliGRAM(s) IV Intermittent every 12 hours    MEDICATIONS  (PRN):  acetaminophen    Suspension .. 650 milliGRAM(s) Oral every 6 hours PRN Temp greater or equal to 38C (100.4F), Moderate Pain (4 - 6)  fentaNYL    Injectable 50 MICROGram(s) IV Push every 15 minutes PRN uptitration      ALLERGIES:  Allergies    amitriptyline (Other)    Intolerances        LABS:                        9.6    12.22 )-----------( 372      ( 2022 00:14 )             31.5     07-12    136  |  104  |  23  ----------------------------<  248<H>  4.3   |  16<L>  |  1.21    Ca    8.0<L>      2022 00:16  Phos  3.7       Mg     2.0         TPro  7.5  /  Alb  3.6  /  TBili  1.0  /  DBili  x   /  AST  51<H>  /  ALT  40  /  AlkPhos  145<H>      PT/INR - ( 2022 00:15 )   PT: 15.2 sec;   INR: 1.31 ratio         PTT - ( 2022 00:15 )  PTT:30.5 sec  Urinalysis Basic - ( 2022 00:16 )    Color: Yellow / Appearance: Slightly Turbid / S.035 / pH: x  Gluc: x / Ketone: Trace  / Bili: Negative / Urobili: Negative   Blood: x / Protein: 100 / Nitrite: Negative   Leuk Esterase: Negative / RBC: 2 /hpf / WBC 2 /HPF   Sq Epi: x / Non Sq Epi: 1 / Bacteria: Many      ABG:  pH, Arterial: 7.24 (22 @ 05:20)  pCO2, Arterial: 44 mmHg (22 @ 05:20)  pO2, Arterial: 62 mmHg (22 @ 05:20)  pH, Arterial: 7.26 (22 @ 00:05)  pCO2, Arterial: 40 mmHg (22 @ 00:05)  pO2, Arterial: 69 mmHg (22 @ 00:05)  pH, Arterial: 7.25 (22 @ 20:34)  pCO2, Arterial: 45 mmHg (22 @ 20:34)  pO2, Arterial: 76 mmHg (22 @ 20:34)  pH, Arterial: 7.41 (22 @ 17:03)  pCO2, Arterial: 32 mmHg (22 @ 17:03)  pO2, Arterial: 99 mmHg (22 @ 17:03)  pH, Arterial: 7.36 (22 @ 11:08)  pCO2, Arterial: 37 mmHg (22 @ 11:08)  pO2, Arterial: 46 mmHg (22 @ 11:08)      vBG:  pH, Venous: 7.23 (22 @ 01:34)  pCO2, Venous: 47 mmHg (22:34)  pO2, Venous: 49 mmHg (22:34)  HCO3, Venous: 20 mmol/L (22:34)  pH, Venous: 7.26 (22 19:11)  pCO2, Venous: 49 mmHg (22 19:11)  pO2, Venous: 45 mmHg (22 @ 19:11)  HCO3, Venous: 22 mmol/L (22 19:11)  pH, Venous: 7.37 (22 17:15)  pCO2, Venous: 37 mmHg (22 17:15)  pO2, Venous: 65 mmHg (22 17:15)  HCO3, Venous: 21 mmol/L (22 17:15)  pH, Venous: 7.37 (22:08)  pCO2, Venous: 38 mmHg (22 11:08)  pO2, Venous: 49 mmHg (22 11:08)  HCO3, Venous: 22 mmol/L (22 11:08)    Micro:    Culture - Blood (collected 07-10-22 @ 14:35)  Source: .Blood Blood-Peripheral  Preliminary Report (22 23:02):    No growth to date.    Culture - Blood (collected 07-10-22 @ 14:20)  Source: .Blood Blood-Peripheral  Preliminary Report (22 23:02):    No growth to date.          RADIOLOGY & ADDITIONAL TESTS: Reviewed.   INTERVAL HPI/OVERNIGHT EVENTS:  - intubated for hypoxemia/increased work of breathing on bipap  - overnight, satting 88-90% on 100%fio2  - started on nimbex gtt this AM and proned   -fever curve decreasing, but still persistently febrile   SUBJECTIVE: Patient seen and examined at bedside.       VITAL SIGNS:  ICU Vital Signs Last 24 Hrs  T(C): 38 (2022 04:00), Max: 39.4 (2022 06:08)  T(F): 100.4 (2022 04:00), Max: 103 (2022 06:08)  HR: 68 (2022 05:45) (62 - 96)  BP: 148/65 (2022 20:00) (92/46 - 162/70)  BP(mean): 94 (2022 20:00) (67 - 101)  ABP: 121/55 (2022 05:45) (103/49 - 162/69)  ABP(mean): 75 (2022 05:45) (67 - 100)  RR: 29 (2022 05:45) (19 - 46)  SpO2: 87% (2022 05:45) (81% - 97%)    O2 Parameters below as of 2022 20:00  Patient On (Oxygen Delivery Method): ventilator    O2 Concentration (%): 100      Mode: AC/ CMV (Assist Control/ Continuous Mandatory Ventilation), RR (machine): 24, TV (machine): 500, FiO2: 100, PEEP: 15, ITime: 1, MAP: 21, PIP: 36  Plateau pressure:   P/F ratio:      @ 07:01  -   @ 05:57  --------------------------------------------------------  IN: 1443.2 mL / OUT: 685 mL / NET: 758.2 mL      CAPILLARY BLOOD GLUCOSE      POCT Blood Glucose.: 113 mg/dL (2022 12:08)      ECG:    PHYSICAL EXAM:    GENERAL: intubated, sedated and paralyzed   HEAD:  Atraumatic. Normocephalic.  EYES: pupils pinpoint but equal. Normal conjunctiva/sclera.  CARDIAC: +distance heart sounds. Regular rhythm w/o m, r,g   LUNG/CHEST: distant but clear breath sounds bilat. No wheezes. No rales. No rhonchi.  POCUS showed scattered B lines bilat with bibasilar consolidations w/o effusions   ABDOMEN: Soft. No tenderness. No distension. No fluid wave. Normal bowel sounds.  BACK: battery pack and bloody tape in thoracic midline of spine. Site is not erythematous or indurated and without drainage.   VASCULAR: +2 b/l radial or ulnar pulses. Palpable DP pulses.  EXTREMITIES:  No clubbing. No cyanosis. +2 b/l LE nonpitting edema unchanged.   NEUROLOGY: unable to examine as pt is paralyzed.       MEDICATIONS:  MEDICATIONS  (STANDING):  azithromycin  IVPB 500 milliGRAM(s) IV Intermittent every 24 hours  chlorhexidine 0.12% Liquid 15 milliLiter(s) Oral Mucosa every 12 hours  chlorhexidine 4% Liquid 1 Application(s) Topical <User Schedule>  dicyclomine 10 milliGRAM(s) Oral before breakfast  enoxaparin Injectable 40 milliGRAM(s) SubCutaneous every 24 hours  fentaNYL   Infusion... 2 MICROgram(s)/kG/Hr (16.9 mL/Hr) IV Continuous <Continuous>  glucagon  Injectable 1 milliGRAM(s) IntraMuscular once  insulin lispro (ADMELOG) corrective regimen sliding scale   SubCutaneous every 6 hours  norepinephrine Infusion 0.08 MICROgram(s)/kG/Min (25.4 mL/Hr) IV Continuous <Continuous>  piperacillin/tazobactam IVPB.. 3.375 Gram(s) IV Intermittent every 8 hours  propofol Infusion 10 MICROgram(s)/kG/Min (10.2 mL/Hr) IV Continuous <Continuous>  rocuronium Injectable 30 milliGRAM(s) IV Push once  vancomycin  IVPB 1000 milliGRAM(s) IV Intermittent every 12 hours    MEDICATIONS  (PRN):  acetaminophen    Suspension .. 650 milliGRAM(s) Oral every 6 hours PRN Temp greater or equal to 38C (100.4F), Moderate Pain (4 - 6)  fentaNYL    Injectable 50 MICROGram(s) IV Push every 15 minutes PRN uptitration      ALLERGIES:  Allergies    amitriptyline (Other)    Intolerances        LABS:                        9.6    12.22 )-----------( 372      ( 2022 00:14 )             31.5         136  |  104  |  23  ----------------------------<  248<H>  4.3   |  16<L>  |  1.21    Ca    8.0<L>      2022 00:16  Phos  3.7       Mg     2.0         TPro  7.5  /  Alb  3.6  /  TBili  1.0  /  DBili  x   /  AST  51<H>  /  ALT  40  /  AlkPhos  145<H>      PT/INR - ( 2022 00:15 )   PT: 15.2 sec;   INR: 1.31 ratio         PTT - ( 2022 00:15 )  PTT:30.5 sec  Urinalysis Basic - ( 2022 00:16 )    Color: Yellow / Appearance: Slightly Turbid / S.035 / pH: x  Gluc: x / Ketone: Trace  / Bili: Negative / Urobili: Negative   Blood: x / Protein: 100 / Nitrite: Negative   Leuk Esterase: Negative / RBC: 2 /hpf / WBC 2 /HPF   Sq Epi: x / Non Sq Epi: 1 / Bacteria: Many      ABG:  pH, Arterial: 7.24 (22 @ 05:20)  pCO2, Arterial: 44 mmHg (22 @ 05:20)  pO2, Arterial: 62 mmHg (22 @ 05:20)  pH, Arterial: 7.26 (22 @ 00:05)  pCO2, Arterial: 40 mmHg (22 @ 00:05)  pO2, Arterial: 69 mmHg (22 @ 00:05)  pH, Arterial: 7.25 (22 @ 20:34)  pCO2, Arterial: 45 mmHg (22 @ 20:34)  pO2, Arterial: 76 mmHg (22 @ 20:34)  pH, Arterial: 7.41 (22 @ 17:03)  pCO2, Arterial: 32 mmHg (22 @ 17:03)  pO2, Arterial: 99 mmHg (22 @ 17:03)  pH, Arterial: 7.36 (22 @ 11:08)  pCO2, Arterial: 37 mmHg (22 @ 11:08)  pO2, Arterial: 46 mmHg (22 11:08)      vBG:  pH, Venous: 7.23 (22 @ 01:34)  pCO2, Venous: 47 mmHg (22:34)  pO2, Venous: 49 mmHg (22 @ :34)  HCO3, Venous: 20 mmol/L (22:34)  pH, Venous: 7.26 (22 @ 19:11)  pCO2, Venous: 49 mmHg (22 @ 19:11)  pO2, Venous: 45 mmHg (22 @ 19:11)  HCO3, Venous: 22 mmol/L (22 19:11)  pH, Venous: 7.37 (22 @ 17:15)  pCO2, Venous: 37 mmHg (22 @ 17:15)  pO2, Venous: 65 mmHg (22 17:15)  HCO3, Venous: 21 mmol/L (22 @ 17:15)  pH, Venous: 7.37 (22 @ 11:08)  pCO2, Venous: 38 mmHg (22 11:08)  pO2, Venous: 49 mmHg (22 11:08)  HCO3, Venous: 22 mmol/L (22 11:08)    Micro:    Culture - Blood (collected 07-10-22 @ 14:35)  Source: .Blood Blood-Peripheral  Preliminary Report (22 @ 23:02):    No growth to date.    Culture - Blood (collected 07-10-22 @ 14:20)  Source: .Blood Blood-Peripheral  Preliminary Report (22 @ 23:02):    No growth to date.          RADIOLOGY & ADDITIONAL TESTS: Reviewed.

## 2022-07-12 NOTE — DIETITIAN INITIAL EVALUATION ADULT - OTHER CALCULATIONS
Fluid needs deferred to provider. The Zhou State Equation (PSU) 2003b was used to calculate resting energy expenditure: 2873 (based on dosing wt 169.2 kG)

## 2022-07-12 NOTE — DIETITIAN INITIAL EVALUATION ADULT - NS FNS DIET ORDER
Diet, NPO with Tube Feed:   Tube Feeding Modality: Orogastric  Glucerna 1.2 Maldonado (GLUCERNARTH)  Total Volume for 24 Hours (mL): 1260  Intermittent  Starting Tube Feed Rate {mL per Hour}: 10  Increase Tube Feed Rate by (mL): 10    Every hour  Until Goal Tube Feed Rate (mL per Hour): 70  Tube Feeding Hours ON: 18  Tube Feeding OFF (Hours): 6  Tube Feed Start Time: 09:00  Free Water Flush   Total Volume per Flush (mL): 200   Frequency: Every 6 Hours (07-12-22 @ 08:15)

## 2022-07-12 NOTE — PROGRESS NOTE ADULT - ASSESSMENT
Fauzia Linares is a 52 year old with  CRPS, MO, DM2, HTN, HTN, IBS, chronic back pain, pseudotumor cerebri, recent spinal nerve stimulator placed (7/8/22 @ [facility]) who initially presented with hypoxic respiratory failure, found to have multifocal pna 2/2 aspiration pneumonia now with increased work of breathing on bipap requiring micu admission.     #Neuro  A/Ox3  //s/p spinal stimulator w/ chornic back pain, CRPS  - states back pain is controlled with device  -  cont tylenol q6 for pain/fever and Toradol q8   - cont home sertraline     #Cardiovascular  //Sepsis  - HD stable, not requiring pressors   - will cont home nifedipineXL, and hold lisinopril, lasix     #Respiratory  //hypoxic resp failure requiring bipap  - CT demonstrating significant bilat pna   - inc WOB on bipap 12/8/70% w/ RR in 30s satting 90%  - Gas 7.36/37 on vBG  - will try Hiflo to match pt's flow demand and switch back to bipap if fails   - will monitor resp status closely for possible intubation     #GI/Nutrition  //hx IBS   - NPO except meds on bipap   - cont home protonix, bentyl, simethicone, loperamide qd     #/Renal  - voiding freely, no evidence of JUDD  - D5/LR @70 while NPO     #ID  //Asp Pna   - febrile to 104.1 w/ increasing wbc and cont to monitor fever curve   - cont zosyn/vanc for 7 day course and zithro for 5 days for asp pna   - f/u bcx, Ucx, sputum cx, and MRSA swab, urine legionella/strep and narrow abx accordingly   -UA, RVP neg     #Endocrine  //T2DM  -  FS well controlled   - goal BG<180  - cont w/ sliding scale q6hr while NPO     #Hematologic/DVT ppx  - lovenox for dvt ppx     #Ethics  full code Fauzia Linares is a 52 year old with  CRPS, MO, DM2, HTN, HTN, IBS, chronic back pain, pseudotumor cerebri, recent spinal nerve stimulator placed (7/8/22 @ [facility] [place]) who initially presented with hypoxic respiratory failure, found to have multifocal pna 2/2 aspiration pneumonia now with increased work of breathing on bipap admitted to MICU. Now s/p intubated for hypoxic resp failure on bipap and with severe ARDS likely 2/2 Asp PNA    #Neuro  Sedated and paralyzed   //s/p spinal stimulator w/ chornic back pain, CRPS  - on prop 40, fentanyl 2.5, versed 0.02, Nimbex at 3  - versed now weaned off   - cont to titrate nimbex to TOF 3/4  - if requiring further sedation on nimbex will give PRN pushes of Versed   -  tylenol q6hrs and cooling blanket for persistent fevers   - spinal stimulator seems non-infectious on physical exam and unlikely to be nidus of infx/persistent fevers. will cont to monitor.   - cont home sertraline     #Cardiovascular  //Sepsis, vasoplegia 2/2 sedation  - Levo 0.05 2/2 vasoplegia from sedation. will cont to titrate for MAP 65  - will cont to hold home nifedipineXL, and hold lisinopril, lasix     #Respiratory  //Severe ARDS likely 2/2 bilat asp pna   - CT demonstrating significant bilat pna   - /24/15/100% satting 90%  - P/F ratio worsening from 76-->62, with worsening lung compliance (Pplat 37 w/ driving pressure 22)  - Gas 7.24/44/62 on 100% fio2  - Will prone and keep paralyzed to improve oxygenation/atelectasis/ V/Q matching   - bronchoscopy peformed. will f/u BAL       #GI/Nutrition  //hx IBS   - start TF at trickle feeding while proned. Then will inc to goal of 70cc/hr for 18hrs   - cont home protonix   - bowel regimen and movantik  - will hold home bentyl, simethicone, loperamide w/ fentanyl gtt on     #/Renal  - Crn inc from 0.9 to 1.2   - making good UO      #ID  //Asp Pna, persistent fevers   - febrile to 104.1. still febrile but fever curve decreasing   - cont tylenol q6 and give an extra 2 doses of 1gm of tylenol today, LFTS nl, but will cont to monitor   - cont zosyn/vanc for 7 day course for asp pna. completed course of 3 days of Zithro.   - f/u Ucx, BAL and MRSA swab and narrow abx accordingly   - bcx negx2, sputum cx neg, UA, RVP neg     #Endocrine  //T2DM  -  FS well controlled   - goal BG<180  - cont w/ sliding scale q6hr while NPO     #Hematologic/DVT ppx  - will switch to heparin 7500 q8 subQ from lovenox for inc in Crn for DVT ppx    #Ethics  full code

## 2022-07-12 NOTE — DIETITIAN INITIAL EVALUATION ADULT - ENTERAL
As pt on pressors without a BM, recommend Vital AF at goal rate 75 mL/hr x18 hrs with ProSource NoCarb TF x2 daily to provide total volume 1350 mL, 1700 kcals (Total: 2,772 kcals with propofol providing ~1,072 kcals), 123 gm protein, and 1095 mL free water. Meets 16 kcals/kG based on dosing wt 169.2 kG and 1.9 gm protien/kG based on IBW 63.5 kG.

## 2022-07-12 NOTE — DIETITIAN INITIAL EVALUATION ADULT - REASON FOR ADMISSION
Per Chart: Pt is a 51 y/o F with Hx CRPS, DM2, HTN, HTN, IBS, chronic back pain, pseudotumor cerebri, recent spinal nerve stimulator placed (7/8/22 @ [facility]), who presented on 7/10 with 2 days fevers, SOB, productive cough.

## 2022-07-12 NOTE — DIETITIAN INITIAL EVALUATION ADULT - NSFNSADHERENCEPTAFT_GEN_A_CORE
Unknown if pt on therapeutic diet PTA. Pt with Hx of T2DM; per H&P pt on Tresiba and Novolog PTA. No recent A1C to address.

## 2022-07-13 LAB
A1C WITH ESTIMATED AVERAGE GLUCOSE RESULT: 7.5 % — HIGH (ref 4–5.6)
ALBUMIN SERPL ELPH-MCNC: 2.9 G/DL — LOW (ref 3.3–5)
ALP SERPL-CCNC: 177 U/L — HIGH (ref 40–120)
ALT FLD-CCNC: 60 U/L — HIGH (ref 10–45)
ANION GAP SERPL CALC-SCNC: 12 MMOL/L — SIGNIFICANT CHANGE UP (ref 5–17)
APTT BLD: 31 SEC — SIGNIFICANT CHANGE UP (ref 27.5–35.5)
AST SERPL-CCNC: 86 U/L — HIGH (ref 10–40)
BASE EXCESS BLDV CALC-SCNC: -4.2 MMOL/L — LOW (ref -2–2)
BILIRUB SERPL-MCNC: 0.7 MG/DL — SIGNIFICANT CHANGE UP (ref 0.2–1.2)
BLOOD GAS VENOUS - CREATININE: SIGNIFICANT CHANGE UP MG/DL (ref 0.5–1.3)
BUN SERPL-MCNC: 19 MG/DL — SIGNIFICANT CHANGE UP (ref 7–23)
CA-I SERPL-SCNC: 1.18 MMOL/L — SIGNIFICANT CHANGE UP (ref 1.15–1.33)
CALCIUM SERPL-MCNC: 8.3 MG/DL — LOW (ref 8.4–10.5)
CHLORIDE BLDV-SCNC: 104 MMOL/L — SIGNIFICANT CHANGE UP (ref 96–108)
CHLORIDE SERPL-SCNC: 104 MMOL/L — SIGNIFICANT CHANGE UP (ref 96–108)
CO2 BLDV-SCNC: 24 MMOL/L — SIGNIFICANT CHANGE UP (ref 22–26)
CO2 SERPL-SCNC: 19 MMOL/L — LOW (ref 22–31)
CREAT SERPL-MCNC: 1.08 MG/DL — SIGNIFICANT CHANGE UP (ref 0.5–1.3)
CULTURE RESULTS: SIGNIFICANT CHANGE UP
EGFR: 62 ML/MIN/1.73M2 — SIGNIFICANT CHANGE UP
ESTIMATED AVERAGE GLUCOSE: 169 MG/DL — HIGH (ref 68–114)
GAS PNL BLDA: SIGNIFICANT CHANGE UP
GAS PNL BLDV: 134 MMOL/L — LOW (ref 136–145)
GAS PNL BLDV: SIGNIFICANT CHANGE UP
GAS PNL BLDV: SIGNIFICANT CHANGE UP
GLUCOSE BLDC GLUCOMTR-MCNC: 132 MG/DL — HIGH (ref 70–99)
GLUCOSE BLDC GLUCOMTR-MCNC: 164 MG/DL — HIGH (ref 70–99)
GLUCOSE BLDC GLUCOMTR-MCNC: 189 MG/DL — HIGH (ref 70–99)
GLUCOSE BLDV-MCNC: 181 MG/DL — HIGH (ref 70–99)
GLUCOSE SERPL-MCNC: 158 MG/DL — HIGH (ref 70–99)
HCO3 BLDV-SCNC: 22 MMOL/L — SIGNIFICANT CHANGE UP (ref 22–29)
HCT VFR BLD CALC: 29.9 % — LOW (ref 34.5–45)
HCT VFR BLDA CALC: 27 % — LOW (ref 34.5–46.5)
HGB BLD CALC-MCNC: 9.1 G/DL — LOW (ref 11.7–16.1)
HGB BLD-MCNC: 9.2 G/DL — LOW (ref 11.5–15.5)
HMPV RNA SPEC QL NAA+PROBE: DETECTED
INR BLD: 1.15 RATIO — SIGNIFICANT CHANGE UP (ref 0.88–1.16)
LACTATE BLDV-MCNC: 1 MMOL/L — SIGNIFICANT CHANGE UP (ref 0.7–2)
MAGNESIUM SERPL-MCNC: 2.2 MG/DL — SIGNIFICANT CHANGE UP (ref 1.6–2.6)
MCHC RBC-ENTMCNC: 29.2 PG — SIGNIFICANT CHANGE UP (ref 27–34)
MCHC RBC-ENTMCNC: 30.8 GM/DL — LOW (ref 32–36)
MCV RBC AUTO: 94.9 FL — SIGNIFICANT CHANGE UP (ref 80–100)
NIGHT BLUE STAIN TISS: SIGNIFICANT CHANGE UP
NRBC # BLD: 0 /100 WBCS — SIGNIFICANT CHANGE UP (ref 0–0)
PCO2 BLDV: 45 MMHG — HIGH (ref 39–42)
PH BLDV: 7.3 — LOW (ref 7.32–7.43)
PHOSPHATE SERPL-MCNC: 2.8 MG/DL — SIGNIFICANT CHANGE UP (ref 2.5–4.5)
PLATELET # BLD AUTO: 330 K/UL — SIGNIFICANT CHANGE UP (ref 150–400)
PO2 BLDV: 52 MMHG — HIGH (ref 25–45)
POTASSIUM BLDV-SCNC: 4 MMOL/L — SIGNIFICANT CHANGE UP (ref 3.5–5.1)
POTASSIUM SERPL-MCNC: 3.9 MMOL/L — SIGNIFICANT CHANGE UP (ref 3.5–5.3)
POTASSIUM SERPL-SCNC: 3.9 MMOL/L — SIGNIFICANT CHANGE UP (ref 3.5–5.3)
PROT SERPL-MCNC: 7.1 G/DL — SIGNIFICANT CHANGE UP (ref 6–8.3)
PROTHROM AB SERPL-ACNC: 13.3 SEC — SIGNIFICANT CHANGE UP (ref 10.5–13.4)
RAPID RVP RESULT: DETECTED
RBC # BLD: 3.15 M/UL — LOW (ref 3.8–5.2)
RBC # FLD: 13.9 % — SIGNIFICANT CHANGE UP (ref 10.3–14.5)
SAO2 % BLDV: 81.8 % — SIGNIFICANT CHANGE UP (ref 67–88)
SARS-COV-2 RNA SPEC QL NAA+PROBE: SIGNIFICANT CHANGE UP
SODIUM SERPL-SCNC: 135 MMOL/L — SIGNIFICANT CHANGE UP (ref 135–145)
SPECIMEN SOURCE: SIGNIFICANT CHANGE UP
SPECIMEN SOURCE: SIGNIFICANT CHANGE UP
WBC # BLD: 8.63 K/UL — SIGNIFICANT CHANGE UP (ref 3.8–10.5)
WBC # FLD AUTO: 8.63 K/UL — SIGNIFICANT CHANGE UP (ref 3.8–10.5)

## 2022-07-13 PROCEDURE — 99291 CRITICAL CARE FIRST HOUR: CPT | Mod: GC

## 2022-07-13 PROCEDURE — 76604 US EXAM CHEST: CPT | Mod: 26,GC

## 2022-07-13 PROCEDURE — 93306 TTE W/DOPPLER COMPLETE: CPT | Mod: 26

## 2022-07-13 PROCEDURE — 93308 TTE F-UP OR LMTD: CPT | Mod: 26,GC

## 2022-07-13 RX ORDER — FUROSEMIDE 40 MG
20 TABLET ORAL ONCE
Refills: 0 | Status: COMPLETED | OUTPATIENT
Start: 2022-07-13 | End: 2022-07-13

## 2022-07-13 RX ORDER — POLYETHYLENE GLYCOL 3350 17 G/17G
17 POWDER, FOR SOLUTION ORAL EVERY 12 HOURS
Refills: 0 | Status: DISCONTINUED | OUTPATIENT
Start: 2022-07-13 | End: 2022-07-17

## 2022-07-13 RX ADMIN — SERTRALINE 50 MILLIGRAM(S): 25 TABLET, FILM COATED ORAL at 11:04

## 2022-07-13 RX ADMIN — CISATRACURIUM BESYLATE 30.5 MICROGRAM(S)/KG/MIN: 2 INJECTION INTRAVENOUS at 07:35

## 2022-07-13 RX ADMIN — PROPOFOL 10.2 MICROGRAM(S)/KG/MIN: 10 INJECTION, EMULSION INTRAVENOUS at 07:36

## 2022-07-13 RX ADMIN — Medication 20 MILLIGRAM(S): at 10:34

## 2022-07-13 RX ADMIN — PIPERACILLIN AND TAZOBACTAM 25 GRAM(S): 4; .5 INJECTION, POWDER, LYOPHILIZED, FOR SOLUTION INTRAVENOUS at 15:24

## 2022-07-13 RX ADMIN — POLYETHYLENE GLYCOL 3350 17 GRAM(S): 17 POWDER, FOR SOLUTION ORAL at 11:04

## 2022-07-13 RX ADMIN — HEPARIN SODIUM 7500 UNIT(S): 5000 INJECTION INTRAVENOUS; SUBCUTANEOUS at 05:05

## 2022-07-13 RX ADMIN — CHLORHEXIDINE GLUCONATE 1 APPLICATION(S): 213 SOLUTION TOPICAL at 05:05

## 2022-07-13 RX ADMIN — HEPARIN SODIUM 7500 UNIT(S): 5000 INJECTION INTRAVENOUS; SUBCUTANEOUS at 13:17

## 2022-07-13 RX ADMIN — Medication 25.4 MICROGRAM(S)/KG/MIN: at 07:34

## 2022-07-13 RX ADMIN — HEPARIN SODIUM 7500 UNIT(S): 5000 INJECTION INTRAVENOUS; SUBCUTANEOUS at 22:30

## 2022-07-13 RX ADMIN — PROPOFOL 10.2 MICROGRAM(S)/KG/MIN: 10 INJECTION, EMULSION INTRAVENOUS at 09:45

## 2022-07-13 RX ADMIN — PIPERACILLIN AND TAZOBACTAM 25 GRAM(S): 4; .5 INJECTION, POWDER, LYOPHILIZED, FOR SOLUTION INTRAVENOUS at 00:17

## 2022-07-13 RX ADMIN — FENTANYL CITRATE 16.9 MICROGRAM(S)/KG/HR: 50 INJECTION INTRAVENOUS at 09:46

## 2022-07-13 RX ADMIN — FENTANYL CITRATE 16.9 MICROGRAM(S)/KG/HR: 50 INJECTION INTRAVENOUS at 07:37

## 2022-07-13 RX ADMIN — PROPOFOL 10.2 MICROGRAM(S)/KG/MIN: 10 INJECTION, EMULSION INTRAVENOUS at 22:30

## 2022-07-13 RX ADMIN — PANTOPRAZOLE SODIUM 40 MILLIGRAM(S): 20 TABLET, DELAYED RELEASE ORAL at 11:04

## 2022-07-13 RX ADMIN — CHLORHEXIDINE GLUCONATE 15 MILLILITER(S): 213 SOLUTION TOPICAL at 17:07

## 2022-07-13 RX ADMIN — Medication 2: at 17:10

## 2022-07-13 RX ADMIN — Medication 2: at 00:18

## 2022-07-13 RX ADMIN — Medication 20 MILLIGRAM(S): at 15:24

## 2022-07-13 RX ADMIN — NALOXEGOL OXALATE 25 MILLIGRAM(S): 12.5 TABLET, FILM COATED ORAL at 11:05

## 2022-07-13 RX ADMIN — Medication 2: at 11:55

## 2022-07-13 RX ADMIN — CHLORHEXIDINE GLUCONATE 15 MILLILITER(S): 213 SOLUTION TOPICAL at 05:06

## 2022-07-13 RX ADMIN — PIPERACILLIN AND TAZOBACTAM 25 GRAM(S): 4; .5 INJECTION, POWDER, LYOPHILIZED, FOR SOLUTION INTRAVENOUS at 08:26

## 2022-07-13 RX ADMIN — SENNA PLUS 5 MILLILITER(S): 8.6 TABLET ORAL at 22:30

## 2022-07-13 NOTE — PROGRESS NOTE ADULT - SUBJECTIVE AND OBJECTIVE BOX
INTERVAL HPI/OVERNIGHT EVENTS:  - intubated for hypoxemia/increased work of breathing on bipap  - overnight, satting 88-90% on 100%fio2  - started on nimbex gtt this AM and proned   -fever curve decreasing, but still persistently febrile   SUBJECTIVE: Patient seen and examined at bedside.       VITAL SIGNS:  ICU Vital Signs Last 24 Hrs  T(C): 36.9 (2022 04:00), Max: 38.3 (2022 12:00)  T(F): 98.4 (2022 04:00), Max: 100.9 (2022 12:00)  HR: 50 (2022 07:00) (47 - 79)  BP: 138/65 (2022 20:00) (138/65 - 138/65)  BP(mean): 93 (2022 20:00) (93 - 93)  ABP: 127/56 (2022 07:00) (106/46 - 168/67)  ABP(mean): 80 (2022 07:00) (63 - 100)  RR: 26 (2022 07:00) (19 - 27)  SpO2: 100% (2022 07:00) (88% - 100%)    O2 Parameters below as of 2022 20:00  Patient On (Oxygen Delivery Method): ventilator    O2 Concentration (%): 60      Mode: AC/ CMV (Assist Control/ Continuous Mandatory Ventilation), RR (machine): 24, TV (machine): 500, FiO2: 100, PEEP: 15, ITime: 1, MAP: 21, PIP: 36  Plateau pressure:   P/F ratio:     I&O's Summary    2022 07:01  -  2022 07:00  --------------------------------------------------------  IN: 3135.9 mL / OUT: 1905 mL / NET: 1230.9 mL      CAPILLARY BLOOD GLUCOSE      POCT Blood Glucose.: 113 mg/dL (2022 12:08)      ECG:    PHYSICAL EXAM:    GENERAL: intubated, sedated and paralyzed   HEAD:  Atraumatic. Normocephalic.  EYES: pupils pinpoint but equal. Normal conjunctiva/sclera.  CARDIAC: +distance heart sounds. Regular rhythm w/o m, r,g   LUNG/CHEST: distant but clear breath sounds bilat. No wheezes. No rales. No rhonchi.  POCUS showed scattered B lines bilat with bibasilar consolidations w/o effusions   ABDOMEN: Soft. No tenderness. No distension. No fluid wave. Normal bowel sounds.  BACK: battery pack and bloody tape in thoracic midline of spine. Site is not erythematous or indurated and without drainage.   VASCULAR: +2 b/l radial or ulnar pulses. Palpable DP pulses.  EXTREMITIES:  No clubbing. No cyanosis. +2 b/l LE nonpitting edema unchanged.   NEUROLOGY: unable to examine as pt is paralyzed.       MEDICATIONS:  MEDICATIONS  (STANDING):  azithromycin  IVPB 500 milliGRAM(s) IV Intermittent every 24 hours  chlorhexidine 0.12% Liquid 15 milliLiter(s) Oral Mucosa every 12 hours  chlorhexidine 4% Liquid 1 Application(s) Topical <User Schedule>  dicyclomine 10 milliGRAM(s) Oral before breakfast  enoxaparin Injectable 40 milliGRAM(s) SubCutaneous every 24 hours  fentaNYL   Infusion... 2 MICROgram(s)/kG/Hr (16.9 mL/Hr) IV Continuous <Continuous>  glucagon  Injectable 1 milliGRAM(s) IntraMuscular once  insulin lispro (ADMELOG) corrective regimen sliding scale   SubCutaneous every 6 hours  norepinephrine Infusion 0.08 MICROgram(s)/kG/Min (25.4 mL/Hr) IV Continuous <Continuous>  piperacillin/tazobactam IVPB.. 3.375 Gram(s) IV Intermittent every 8 hours  propofol Infusion 10 MICROgram(s)/kG/Min (10.2 mL/Hr) IV Continuous <Continuous>  rocuronium Injectable 30 milliGRAM(s) IV Push once  vancomycin  IVPB 1000 milliGRAM(s) IV Intermittent every 12 hours    MEDICATIONS  (PRN):  acetaminophen    Suspension .. 650 milliGRAM(s) Oral every 6 hours PRN Temp greater or equal to 38C (100.4F), Moderate Pain (4 - 6)  fentaNYL    Injectable 50 MICROGram(s) IV Push every 15 minutes PRN uptitration      ALLERGIES:  Allergies    amitriptyline (Other)    Intolerances        LABS:                        9.6    12.22 )-----------( 372      ( 2022 00:14 )             31.5         136  |  104  |  23  ----------------------------<  248<H>  4.3   |  16<L>  |  1.21    Ca    8.0<L>      2022 00:16  Phos  3.7       Mg     2.0         TPro  7.5  /  Alb  3.6  /  TBili  1.0  /  DBili  x   /  AST  51<H>  /  ALT  40  /  AlkPhos  145<H>      PT/INR - ( 2022 00:15 )   PT: 15.2 sec;   INR: 1.31 ratio         PTT - ( 2022 00:15 )  PTT:30.5 sec  Urinalysis Basic - ( 2022 00:16 )    Color: Yellow / Appearance: Slightly Turbid / S.035 / pH: x  Gluc: x / Ketone: Trace  / Bili: Negative / Urobili: Negative   Blood: x / Protein: 100 / Nitrite: Negative   Leuk Esterase: Negative / RBC: 2 /hpf / WBC 2 /HPF   Sq Epi: x / Non Sq Epi: 1 / Bacteria: Many      ABG:  pH, Arterial: 7.24 (22 @ 05:20)  pCO2, Arterial: 44 mmHg (22 @ 05:20)  pO2, Arterial: 62 mmHg (22 @ 05:20)  pH, Arterial: 7.26 (22 @ 00:05)  pCO2, Arterial: 40 mmHg (22 @ 00:05)  pO2, Arterial: 69 mmHg (22 @ 00:05)  pH, Arterial: 7.25 (22 @ 20:34)  pCO2, Arterial: 45 mmHg (22 @ 20:34)  pO2, Arterial: 76 mmHg (22 @ 20:34)  pH, Arterial: 7.41 (22 @ 17:03)  pCO2, Arterial: 32 mmHg (22 @ 17:03)  pO2, Arterial: 99 mmHg (22 @ 17:03)  pH, Arterial: 7.36 (22 @ 11:08)  pCO2, Arterial: 37 mmHg (22 11:08)  pO2, Arterial: 46 mmHg (22 @ 11:08)      vBG:  pH, Venous: 7.23 (22 @ 01:34)  pCO2, Venous: 47 mmHg (22:34)  pO2, Venous: 49 mmHg (22:34)  HCO3, Venous: 20 mmol/L (22:34)  pH, Venous: 7.26 (22 19:11)  pCO2, Venous: 49 mmHg (22 19:11)  pO2, Venous: 45 mmHg (22 @ 19:11)  HCO3, Venous: 22 mmol/L (22 19:11)  pH, Venous: 7.37 (22 @ 17:15)  pCO2, Venous: 37 mmHg (22 @ 17:15)  pO2, Venous: 65 mmHg (22 17:15)  HCO3, Venous: 21 mmol/L (22 17:15)  pH, Venous: 7.37 (22 11:08)  pCO2, Venous: 38 mmHg (22 11:08)  pO2, Venous: 49 mmHg (22 11:08)  HCO3, Venous: 22 mmol/L (22 11:08)    Micro:    Culture - Blood (collected 07-10-22 @ 14:35)  Source: .Blood Blood-Peripheral  Preliminary Report (22 23:02):    No growth to date.    Culture - Blood (collected 07-10-22 @ 14:20)  Source: .Blood Blood-Peripheral  Preliminary Report (22 @ 23:02):    No growth to date.          RADIOLOGY & ADDITIONAL TESTS: Reviewed.   INTERVAL HPI/OVERNIGHT EVENTS:  -pt supined at 7, will potentially prone after new ABG results at 11:30. awaiting final read of bronchial lavage. No BM yet this morning. Positive for human metapneumovirus.   SUBJECTIVE: Patient seen and examined at bedside. Under heavy sedation- unarousable.     VITAL SIGNS:  ICU Vital Signs Last 24 Hrs  T(C): 36.9 (13 Jul 2022 04:00), Max: 38.3 (12 Jul 2022 12:00)  T(F): 98.4 (13 Jul 2022 04:00), Max: 100.9 (12 Jul 2022 12:00)  HR: 50 (13 Jul 2022 07:00) (47 - 79)  BP: 138/65 (12 Jul 2022 20:00) (138/65 - 138/65)  BP(mean): 93 (12 Jul 2022 20:00) (93 - 93)  ABP: 127/56 (13 Jul 2022 07:00) (106/46 - 168/67)  ABP(mean): 80 (13 Jul 2022 07:00) (63 - 100)  RR: 26 (13 Jul 2022 07:00) (19 - 27)  SpO2: 100% (13 Jul 2022 07:00) (88% - 100%)    O2 Parameters below as of 12 Jul 2022 20:00  Patient On (Oxygen Delivery Method): ventilator    O2 Concentration (%): 60      Mode: AC/ CMV (Assist Control/ Continuous Mandatory Ventilation), RR (machine): 24, TV (machine): 500, FiO2: 100, PEEP: 15, ITime: 1, MAP: 21, PIP: 36  Plateau pressure:   P/F ratio:     I&O's Summary    12 Jul 2022 07:01  -  13 Jul 2022 07:00  --------------------------------------------------------  IN: 3135.9 mL / OUT: 1905 mL / NET: 1230.9 mL      CAPILLARY BLOOD GLUCOSE      POCT Blood Glucose.: 113 mg/dL (11 Jul 2022 12:08)      ECG:    PHYSICAL EXAM:    GENERAL: intubated, sedated and paralyzed   HEAD:  Atraumatic. Normocephalic.  EYES: pupils pinpoint but equal. Normal conjunctiva/sclera.  CARDIAC: +distance heart sounds. Regular rhythm w/o m, r,g   LUNG/CHEST: distant but clear breath sounds bilat. No wheezes. No rales. No rhonchi.  POCUS showed scattered B lines bilat with bibasilar consolidations w/o effusions   ABDOMEN: Soft. No tenderness. No distension. No fluid wave. Normal bowel sounds.  BACK: battery pack and bloody tape in thoracic midline of spine. Site is not erythematous or indurated and without drainage.   VASCULAR: +2 b/l radial or ulnar pulses. Palpable DP pulses.  EXTREMITIES:  No clubbing. No cyanosis. +2 b/l LE nonpitting edema unchanged.   NEUROLOGY: unable to examine as pt is paralyzed.       MEDICATIONS:  MEDICATIONS  (STANDING):  azithromycin  IVPB 500 milliGRAM(s) IV Intermittent every 24 hours  chlorhexidine 0.12% Liquid 15 milliLiter(s) Oral Mucosa every 12 hours  chlorhexidine 4% Liquid 1 Application(s) Topical <User Schedule>  dicyclomine 10 milliGRAM(s) Oral before breakfast  enoxaparin Injectable 40 milliGRAM(s) SubCutaneous every 24 hours  fentaNYL   Infusion... 2 MICROgram(s)/kG/Hr (16.9 mL/Hr) IV Continuous <Continuous>  glucagon  Injectable 1 milliGRAM(s) IntraMuscular once  insulin lispro (ADMELOG) corrective regimen sliding scale   SubCutaneous every 6 hours  norepinephrine Infusion 0.08 MICROgram(s)/kG/Min (25.4 mL/Hr) IV Continuous <Continuous>  piperacillin/tazobactam IVPB.. 3.375 Gram(s) IV Intermittent every 8 hours  propofol Infusion 10 MICROgram(s)/kG/Min (10.2 mL/Hr) IV Continuous <Continuous>  rocuronium Injectable 30 milliGRAM(s) IV Push once  vancomycin  IVPB 1000 milliGRAM(s) IV Intermittent every 12 hours    MEDICATIONS  (PRN):  acetaminophen    Suspension .. 650 milliGRAM(s) Oral every 6 hours PRN Temp greater or equal to 38C (100.4F), Moderate Pain (4 - 6)  fentaNYL    Injectable 50 MICROGram(s) IV Push every 15 minutes PRN uptitration      ALLERGIES:  Allergies    amitriptyline (Other)    Intolerances        LABS:             CBC Full  -  ( 13 Jul 2022 00:18 )  WBC Count : 8.63 K/uL  RBC Count : 3.15 M/uL  Hemoglobin : 9.2 g/dL  Hematocrit : 29.9 %  Platelet Count - Automated : 330 K/uL  Mean Cell Volume : 94.9 fl  Mean Cell Hemoglobin : 29.2 pg  Mean Cell Hemoglobin Concentration : 30.8 gm/dL  Auto Neutrophil # : x  Auto Lymphocyte # : x  Auto Monocyte # : x  Auto Eosinophil # : x  Auto Basophil # : x  Auto Neutrophil % : x  Auto Lymphocyte % : x  Auto Monocyte % : x  Auto Eosinophil % : x  Auto Basophil % : x    07-13    135  |  104  |  19  ----------------------------<  158<H>  3.9   |  19<L>  |  1.08    Ca    8.3<L>      13 Jul 2022 00:16  Phos  2.8     07-13  Mg     2.2     07-13    TPro  7.1  /  Alb  2.9<L>  /  TBili  0.7  /  DBili  x   /  AST  86<H>  /  ALT  60<H>  /  AlkPhos  177<H>  07-13    Blood Gas Profile - Arterial (07.13.22 @ 08:24)    pH, Arterial: 7.33    pCO2, Arterial: 38 mmHg    pO2, Arterial: 67 mmHg    HCO3, Arterial: 20 mmol/L    Base Excess, Arterial: -5.5 mmol/L    Oxygen Saturation, Arterial: 94.1 %    Total CO2, Arterial: 21 mmol/L    FIO2, Arterial: 40      Micro:    Culture - Blood (collected 07-10-22 @ 14:35)  Source: .Blood Blood-Peripheral  Preliminary Report (07-11-22 @ 23:02):    No growth to date.    Culture - Blood (collected 07-10-22 @ 14:20)  Source: .Blood Blood-Peripheral  Preliminary Report (07-11-22 @ 23:02):    No growth to date.          RADIOLOGY & ADDITIONAL TESTS: Reviewed.   INTERVAL HPI/OVERNIGHT EVENTS:  -pt supined at 7, will potentially prone after new ABG results at 11:30. awaiting final read of bronchial lavage. No BM yet this morning. Positive for human metapneumovirus. Gave lasix and will monitor output later this afternoon.   SUBJECTIVE: Patient seen and examined at bedside. Under heavy sedation- unarousable.     VITAL SIGNS:  ICU Vital Signs Last 24 Hrs  T(C): 36.9 (13 Jul 2022 04:00), Max: 38.3 (12 Jul 2022 12:00)  T(F): 98.4 (13 Jul 2022 04:00), Max: 100.9 (12 Jul 2022 12:00)  HR: 50 (13 Jul 2022 07:00) (47 - 79)  BP: 138/65 (12 Jul 2022 20:00) (138/65 - 138/65)  BP(mean): 93 (12 Jul 2022 20:00) (93 - 93)  ABP: 127/56 (13 Jul 2022 07:00) (106/46 - 168/67)  ABP(mean): 80 (13 Jul 2022 07:00) (63 - 100)  RR: 26 (13 Jul 2022 07:00) (19 - 27)  SpO2: 100% (13 Jul 2022 07:00) (88% - 100%)    O2 Parameters below as of 12 Jul 2022 20:00  Patient On (Oxygen Delivery Method): ventilator    O2 Concentration (%): 60      Mode: AC/ CMV (Assist Control/ Continuous Mandatory Ventilation), RR (machine): 24, TV (machine): 500, FiO2: 100, PEEP: 15, ITime: 1, MAP: 21, PIP: 36  Plateau pressure:   P/F ratio:     I&O's Summary    12 Jul 2022 07:01  -  13 Jul 2022 07:00  --------------------------------------------------------  IN: 3135.9 mL / OUT: 1905 mL / NET: 1230.9 mL      CAPILLARY BLOOD GLUCOSE      POCT Blood Glucose.: 113 mg/dL (11 Jul 2022 12:08)      ECG:    PHYSICAL EXAM:    GENERAL: intubated, sedated and paralyzed   HEAD:  Atraumatic. Normocephalic.  EYES: pupils pinpoint but equal. Normal conjunctiva/sclera.  CARDIAC: +distance heart sounds. Regular rhythm w/o m, r,g   LUNG/CHEST: distant but clear breath sounds bilat. No wheezes. No rales. No rhonchi.  POCUS showed scattered B lines bilat with bibasilar consolidations w/o effusions   ABDOMEN: Soft. No tenderness. No distension. No fluid wave. Normal bowel sounds.  BACK: battery pack and bloody tape in thoracic midline of spine. Site is not erythematous or indurated and without drainage.   VASCULAR: +2 b/l radial or ulnar pulses. Palpable DP pulses.  EXTREMITIES:  No clubbing. No cyanosis. +2 b/l LE nonpitting edema unchanged.   NEUROLOGY: unable to examine as pt is paralyzed.       MEDICATIONS:  MEDICATIONS  (STANDING):  azithromycin  IVPB 500 milliGRAM(s) IV Intermittent every 24 hours  chlorhexidine 0.12% Liquid 15 milliLiter(s) Oral Mucosa every 12 hours  chlorhexidine 4% Liquid 1 Application(s) Topical <User Schedule>  dicyclomine 10 milliGRAM(s) Oral before breakfast  enoxaparin Injectable 40 milliGRAM(s) SubCutaneous every 24 hours  fentaNYL   Infusion... 2 MICROgram(s)/kG/Hr (16.9 mL/Hr) IV Continuous <Continuous>  glucagon  Injectable 1 milliGRAM(s) IntraMuscular once  insulin lispro (ADMELOG) corrective regimen sliding scale   SubCutaneous every 6 hours  norepinephrine Infusion 0.08 MICROgram(s)/kG/Min (25.4 mL/Hr) IV Continuous <Continuous>  piperacillin/tazobactam IVPB.. 3.375 Gram(s) IV Intermittent every 8 hours  propofol Infusion 10 MICROgram(s)/kG/Min (10.2 mL/Hr) IV Continuous <Continuous>  rocuronium Injectable 30 milliGRAM(s) IV Push once  vancomycin  IVPB 1000 milliGRAM(s) IV Intermittent every 12 hours    MEDICATIONS  (PRN):  acetaminophen    Suspension .. 650 milliGRAM(s) Oral every 6 hours PRN Temp greater or equal to 38C (100.4F), Moderate Pain (4 - 6)  fentaNYL    Injectable 50 MICROGram(s) IV Push every 15 minutes PRN uptitration      ALLERGIES:  Allergies    amitriptyline (Other)    Intolerances        LABS:             CBC Full  -  ( 13 Jul 2022 00:18 )  WBC Count : 8.63 K/uL  RBC Count : 3.15 M/uL  Hemoglobin : 9.2 g/dL  Hematocrit : 29.9 %  Platelet Count - Automated : 330 K/uL  Mean Cell Volume : 94.9 fl  Mean Cell Hemoglobin : 29.2 pg  Mean Cell Hemoglobin Concentration : 30.8 gm/dL  Auto Neutrophil # : x  Auto Lymphocyte # : x  Auto Monocyte # : x  Auto Eosinophil # : x  Auto Basophil # : x  Auto Neutrophil % : x  Auto Lymphocyte % : x  Auto Monocyte % : x  Auto Eosinophil % : x  Auto Basophil % : x    07-13    135  |  104  |  19  ----------------------------<  158<H>  3.9   |  19<L>  |  1.08    Ca    8.3<L>      13 Jul 2022 00:16  Phos  2.8     07-13  Mg     2.2     07-13    TPro  7.1  /  Alb  2.9<L>  /  TBili  0.7  /  DBili  x   /  AST  86<H>  /  ALT  60<H>  /  AlkPhos  177<H>  07-13    Blood Gas Profile - Arterial (07.13.22 @ 08:24)    pH, Arterial: 7.33    pCO2, Arterial: 38 mmHg    pO2, Arterial: 67 mmHg    HCO3, Arterial: 20 mmol/L    Base Excess, Arterial: -5.5 mmol/L    Oxygen Saturation, Arterial: 94.1 %    Total CO2, Arterial: 21 mmol/L    FIO2, Arterial: 40      Micro:    Culture - Blood (collected 07-10-22 @ 14:35)  Source: .Blood Blood-Peripheral  Preliminary Report (07-11-22 @ 23:02):    No growth to date.    Culture - Blood (collected 07-10-22 @ 14:20)  Source: .Blood Blood-Peripheral  Preliminary Report (07-11-22 @ 23:02):    No growth to date.          RADIOLOGY & ADDITIONAL TESTS: Reviewed.

## 2022-07-13 NOTE — CHART NOTE - NSCHARTNOTEFT_GEN_A_CORE
: [place]    INDICATION: Hypoxemic respiratory failure    PROCEDURE:  [X ] LIMITED ECHO  [X ] LIMITED CHEST  [ ] LIMITED RETROPERITONEAL  [ ] LIMITED ABDOMINAL  [ ] LIMITED DVT  [ ] NEEDLE GUIDANCE VASCULAR  [ ] NEEDLE GUIDANCE THORACENTESIS  [ ] NEEDLE GUIDANCE PARACENTESIS  [ ] NEEDLE GUIDANCE PERICARDIOCENTESIS  [ ] OTHER    FINDINGS:  Diffuse B lines bilaterally anteriorly. Bilateral consolidations in the lower lobes. No pleural effusions.  Grossly normal LVSF. RV appears enlarged however RV<LV. No pericardial effusion. IVC 2.5cm    INTERPRETATION:  Diffuse B lines in the anterior lung fields and bilateral consolidations. Grossly normal LVSF. RV appears enlarged and IVC is 2.5 cm.    Images stored on [employer]. : Asia Telles    INDICATION: Hypoxemic respiratory failure    PROCEDURE:  [X ] LIMITED ECHO  [X ] LIMITED CHEST  [ ] LIMITED RETROPERITONEAL  [ ] LIMITED ABDOMINAL  [ ] LIMITED DVT  [ ] NEEDLE GUIDANCE VASCULAR  [ ] NEEDLE GUIDANCE THORACENTESIS  [ ] NEEDLE GUIDANCE PARACENTESIS  [ ] NEEDLE GUIDANCE PERICARDIOCENTESIS  [ ] OTHER    FINDINGS:  Diffuse B lines bilaterally anteriorly. Bilateral consolidations in the lower lobes. No pleural effusions.  Normal LVSF. RV appears enlarged however RV<LV. No pericardial effusion. IVC 2.5cm    INTERPRETATION:  Diffuse B lines in the anterior lung fields and bilateral consolidations.  Normal LVSF. RV appears enlarged and IVC is 2.5 cm.               Images stored on [employer].

## 2022-07-13 NOTE — CHART NOTE - NSCHARTNOTEFT_GEN_A_CORE
52F c hx CRPS, MO, DM2, HTN, HTN, IBS, chronic back pain, pseudotumor cerebri, recent spinal nerve stimulator placed (7/8/22 @ [facility]), pw 2 days fevers, SOB, productive cough.    Called by primary team for Spinal Cord Stimulator lead removal. This is not performed by the Chronic Pain Service.     Recommended Neurosurgery consult for this.   Also recommend notification to implanting surgeon and device .    Please reconsult Chronic Pain Service if assistance with pain management required when pt is awake.    Chronic Pain Service  [phone]

## 2022-07-13 NOTE — PROGRESS NOTE ADULT - CRITICAL CARE ATTENDING COMMENT
Late Entry:  @ 0708 Shift handoff with Jenny Gonsalves RN  @ 0745 Pt awake/alert, oriented to self, place, situation. Pt to dialysis room on portable telemetry [id number], via bed with transporter. Administered PO Midodrine prior to transport. @ 1210 Pt returned to [place] [address] room 1301. Updated pt in  time of 4p scheduled by [address]. @ [phone] Call placed to Ivan Rico to update re: back in room after dialysis and scheduled  time. Call placed to  to inform Ivan reports DME equipment not delivered yet. @ 8540 [address], pt asymptomatic. Pt drank 4oz juice and starting to feed self lunch  @ 1308 Repeat FSBS 121  @ 1600 Discharge time delay per [facility] report, waiting for DME equipment to be delivered to house. @ 1930 Shift handoff with Patricia Robbins RN. Attending Attestation:    Patient seen and examined with resident/fellow.  Agree with above except as noted.  51 yo female morbidly obese, with chronic  back pain syndrome ,s/p spinal pain stimulator 2 days prior to admission, now with acute hypoxemic respiratory failure due to aspiration pneumonia.    1. Acute hypoxemic respiratory failure due to aspiration pneumonia. S/P spinal stimulator insertion done under conscious sedation at  [facility] 2 days prior to admission.  CT chest shows bilateral R> L  multilobar consolidation.  Pt proned last night with good improvement . Now supined and tolerating well.         1.Continue supine position. Plateau pressure 28-29. FIO2 40%. Decrease PEEP from 15 to 13.         2. Continue Nimbex with goal TOF 3-4/4 for next 24 hrs. Possible D/C tomorrow.         3. Continue Fentanyl and Propofol. May need to add Ketamine decrease Fentanyl for bowel.          4.Continue with Vancomycin and Zosyn for aspiration pneumonia. Azithromycin for 3 days of 500mg. total. MRSA nasal swab positive.           2. JUDD. Pt on pressors . Likely ATN . Follow [facility]O. Crweatinine decreasing.  3. DVT prophylaxis  Continue sq heparin                  This patient is critically ill and required frequent bedside visits with therapy change.  Total critical care time spent was 35__ minutes. Attending Attestation:    Patient seen and examined with resident/fellow.  Agree with above except as noted.  53 yo female morbidly obese, with chronic  back pain syndrome ,s/p spinal pain stimulator 2 days prior to admission, now with acute hypoxemic respiratory failure due to aspiration pneumonia.  RVP positive for metapneumonovirus.    1. Acute hypoxemic respiratory failure due to aspiration pneumonia. S/P spinal stimulator insertion done under conscious sedation at  [facility] 2 days prior to admission.  CT chest shows bilateral R> L  multilobar consolidation.  Pt proned last night with good improvement . Now supined and tolerating well.         1.Continue supine position. Plateau pressure 28-29. FIO2 40%. Decrease PEEP from 15 to 13.         2. Continue Nimbex with goal TOF 3-4/4 for next 24 hrs. Possible D/C tomorrow.         3. Continue Fentanyl and Propofol. May need to add Ketamine decrease Fentanyl for bowel.          4.Continue with Vancomycin and Zosyn for aspiration pneumonia. Azithromycin for 3 days of 500mg. total. MRSA nasal swab positive.           2. JUDD. Pt on pressors . Likely ATN . Follow [facility]O. Crweatinine decreasing.  3. DVT prophylaxis  Continue sq heparin                           This patient is critically ill and required frequent bedside visits with therapy change.  Total critical care time spent was 35__ minutes.

## 2022-07-13 NOTE — PROGRESS NOTE ADULT - ASSESSMENT
Fauzia Linares is a 52 year old with  CRPS, MO, DM2, HTN, HTN, IBS, chronic back pain, pseudotumor cerebri, recent spinal nerve stimulator placed (7/8/22 @ [facility]) who initially presented with hypoxic respiratory failure, found to have multifocal pna 2/2 aspiration pneumonia now with increased work of breathing on bipap admitted to MICU. Now s/p intubated for hypoxic resp failure on bipap and with severe ARDS likely 2/2 Asp PNA    #Neuro  Sedated and paralyzed   //s/p spinal stimulator w/ chornic back pain, CRPS  - on prop 40, fentanyl 2.5, versed 0.02, Nimbex at 3  - versed now weaned off   - cont to titrate nimbex to TOF 3/4  - if requiring further sedation on nimbex will give PRN pushes of Versed   -  tylenol q6hrs and cooling blanket for persistent fevers   - spinal stimulator seems non-infectious on physical exam and unlikely to be nidus of infx/persistent fevers. will cont to monitor.   - cont home sertraline     #Cardiovascular  //Sepsis, vasoplegia 2/2 sedation  - Levo 0.05 2/2 vasoplegia from sedation. will cont to titrate for MAP 65  - will cont to hold home nifedipineXL, and hold lisinopril, lasix     #Respiratory  //Severe ARDS likely 2/2 bilat asp pna   - CT demonstrating significant bilat pna   - /24/15/100% satting 90%  - P/F ratio worsening from 76-->62, with worsening lung compliance (Pplat 37 w/ driving pressure 22)  - Gas 7.24/44/62 on 100% fio2  - Will prone and keep paralyzed to improve oxygenation/atelectasis/ V/Q matching   - bronchoscopy peformed. will f/u BAL       #GI/Nutrition  //hx IBS   - start TF at trickle feeding while proned. Then will inc to goal of 70cc/hr for 18hrs   - cont home protonix   - bowel regimen and movantik  - will hold home bentyl, simethicone, loperamide w/ fentanyl gtt on     #/Renal  - Crn inc from 0.9 to 1.2   - making good UO      #ID  //Asp Pna, persistent fevers   - febrile to 104.1. still febrile but fever curve decreasing   - cont tylenol q6 and give an extra 2 doses of 1gm of tylenol today, LFTS nl, but will cont to monitor   - cont zosyn/vanc for 7 day course for asp pna. completed course of 3 days of Zithro.   - f/u Ucx, BAL and MRSA swab and narrow abx accordingly   - bcx negx2, sputum cx neg, UA, RVP neg     #Endocrine  //T2DM  -  FS well controlled   - goal BG<180  - cont w/ sliding scale q6hr while NPO     #Hematologic/DVT ppx  - will switch to heparin 7500 q8 subQ from lovenox for inc in Crn for DVT ppx    #Ethics  full code Fauzia Linares is a 52 year old with  CRPS, MO, DM2, HTN, HTN, IBS, chronic back pain, pseudotumor cerebri, recent spinal nerve stimulator placed (7/8/22 @ [facility]) who initially presented with hypoxic respiratory failure, found to have multifocal pna 2/2 aspiration pneumonia now with increased work of breathing on bipap admitted to MICU. Now s/p intubated for hypoxic resp failure on bipap and with severe ARDS likely 2/2 Asp PNA    #Neuro  Sedated and paralyzed   //s/p spinal stimulator w/ chornic back pain, CRPS  - on prop 40, fentanyl 2.5, Nimbex at 1  - versed now weaned off   - cont to titrate nimbex to TOF 3/4  - if requiring further sedation on nimbex will give PRN pushes of Versed   -  tylenol q6hrs and cooling blanket for persistent fevers   - spinal stimulator seems non-infectious on physical exam and unlikely to be nidus of infx/persistent fevers. will cont to monitor.   - cont home sertraline     #Cardiovascular  //Sepsis, vasoplegia 2/2 sedation  - Levo 0.05 2/2 vasoplegia from sedation. will cont to titrate for MAP 65  - will cont to hold home nifedipineXL, and hold lisinopril, lasix     #Respiratory  //Severe ARDS likely 2/2 bilat asp pna   - CT demonstrating significant bilat pna   - /24/15/100% satting 90%  - P/F ratio worsening from 76-->62, with worsening lung compliance (Pplat 37 w/ driving pressure 22)  - Gas 7.24/44/62 on 100% fio2  - Will prone and keep paralyzed to improve oxygenation/atelectasis/ V/Q matching   - bronchoscopy peformed. will f/u BAL       #GI/Nutrition  //hx IBS   - start TF at trickle feeding while proned. Then will inc to goal of 70cc/hr for 18hrs   - cont home protonix   - bowel regimen and movantik  - will hold home bentyl, simethicone, loperamide w/ fentanyl gtt on     #/Renal  - Crn inc from 0.9 to 1.2   - making good UO      #ID  //Asp Pna, persistent fevers   - febrile to 104.1. still febrile but fever curve decreasing   - cont tylenol q6 and give an extra 2 doses of 1gm of tylenol today, LFTS nl, but will cont to monitor   - cont zosyn/vanc for 7 day course for asp pna. completed course of 3 days of Zithro.   - f/u Ucx, BAL and MRSA swab and narrow abx accordingly   - bcx negx2, sputum cx neg, UA, RVP neg     #Endocrine  //T2DM  -  FS well controlled   - goal BG<180  - cont w/ sliding scale q6hr while NPO     #Hematologic/DVT ppx  - will switch to heparin 7500 q8 subQ from lovenox for inc in Crn for DVT ppx    #Ethics  full code Fauzia Linares is a 52 year old with  CRPS, MO, DM2, HTN, HTN, IBS, chronic back pain, pseudotumor cerebri, recent spinal nerve stimulator placed (7/8/22 @ [facility]) who initially presented with hypoxic respiratory failure, found to have multifocal pna 2/2 aspiration pneumonia now with increased work of breathing on bipap admitted to MICU. Now s/p intubated for hypoxic resp failure on bipap and with severe ARDS likely 2/2 Asp PNA    #Neuro  Sedated and paralyzed   //s/p spinal stimulator w/ chornic back pain, CRPS  - on prop 40, fentanyl 2.5, Nimbex at 1  - versed now weaned off   - cont to titrate nimbex to TOF 3/4  - if requiring further sedation on nimbex will give PRN pushes of Versed   -  tylenol q6hrs and cooling blanket for persistent fevers   - spinal stimulator seems non-infectious on physical exam and unlikely to be nidus of infx/persistent fevers. will cont to monitor.   - cont home sertraline     #Cardiovascular  //Sepsis, vasoplegia 2/2 sedation  - Levo 0.05 2/2 vasoplegia from sedation. will cont to titrate for MAP 65  - will cont to hold home nifedipineXL, and hold lisinopril, lasix     #Respiratory  //Severe ARDS likely 2/2 bilat asp pna   - CT demonstrating significant bilat pna   - /24/15/100% satting 90%  - P/F ratio worsening from 76-->62, with worsening lung compliance (Pplat 37 w/ driving pressure 22)  - Gas 7.24/44/62 on 100% fio2  - Will prone and keep paralyzed to improve oxygenation/atelectasis/ V/Q matching   - bronchoscopy peformed. will f/u BAL       #GI/Nutrition  //hx IBS   - currently TF at trickle feeding while proned. Then will inc to goal of 70cc/hr for 18hrs   - cont home protonix   - bowel regimen and movantik  - will hold home bentyl, simethicone, loperamide w/ fentanyl gtt on     #/Renal  - Crn inc from 0.9 to 1.2   - making good UO      #ID  //Asp Pna, persistent fevers   - febrile to 104.1. still febrile but fever curve decreasing   - cont tylenol q6 and give an extra 2 doses of 1gm of tylenol today, LFTS nl, but will cont to monitor   - cont zosyn/vanc for 7 day course for asp pna. completed course of 3 days of Zithro.   - f/u Ucx, BAL and MRSA swab and narrow abx accordingly   - bcx negx2, sputum cx neg, UA, RVP neg     #Endocrine  //T2DM  -  FS well controlled   - goal BG<180  - cont w/ sliding scale q6hr while NPO     #Hematologic/DVT ppx  - c/w heparin 7500 q8 subQ for DVT ppx    #Ethics  full code

## 2022-07-13 NOTE — PROGRESS NOTE ADULT - NUTRITIONAL ASSESSMENT
This patient has been assessed with a concern for Malnutrition and has been determined to have a diagnosis/diagnoses of Morbid obesity (BMI > 40).    This patient is being managed with:   Diet NPO with Tube Feed-  Tube Feeding Modality: Orogastric  Vital AF (VITALAFRTH)  Total Volume for 24 Hours (mL): 192  Intermittent  Starting Tube Feed Rate {mL per Hour}: 10  Increase Tube Feed Rate by (mL): 0    Every hour  Until Goal Tube Feed Rate (mL per Hour): 10  Tube Feeding Hours ON: 24  Tube Feeding OFF (Hours): 6  Tube Feed Start Time: 09:00  Free Water Flush   Total Volume per Flush (mL): 200   Frequency: Every 6 Hours  No Carb Prosource TF     Qty per Day:  2  Entered: Jul 12 2022  9:35PM

## 2022-07-14 LAB
ALBUMIN SERPL ELPH-MCNC: 2.5 G/DL — LOW (ref 3.3–5)
ALP SERPL-CCNC: 193 U/L — HIGH (ref 40–120)
ALT FLD-CCNC: 58 U/L — HIGH (ref 10–45)
ANION GAP SERPL CALC-SCNC: 17 MMOL/L — SIGNIFICANT CHANGE UP (ref 5–17)
APTT BLD: 28.6 SEC — SIGNIFICANT CHANGE UP (ref 27.5–35.5)
AST SERPL-CCNC: 65 U/L — HIGH (ref 10–40)
BILIRUB SERPL-MCNC: 0.4 MG/DL — SIGNIFICANT CHANGE UP (ref 0.2–1.2)
BUN SERPL-MCNC: 16 MG/DL — SIGNIFICANT CHANGE UP (ref 7–23)
CALCIUM SERPL-MCNC: 7.9 MG/DL — LOW (ref 8.4–10.5)
CHLORIDE SERPL-SCNC: 106 MMOL/L — SIGNIFICANT CHANGE UP (ref 96–108)
CO2 SERPL-SCNC: 15 MMOL/L — LOW (ref 22–31)
CREAT SERPL-MCNC: 0.89 MG/DL — SIGNIFICANT CHANGE UP (ref 0.5–1.3)
CULTURE RESULTS: SIGNIFICANT CHANGE UP
EGFR: 78 ML/MIN/1.73M2 — SIGNIFICANT CHANGE UP
GAS PNL BLDA: SIGNIFICANT CHANGE UP
GLUCOSE BLDC GLUCOMTR-MCNC: 192 MG/DL — HIGH (ref 70–99)
GLUCOSE BLDC GLUCOMTR-MCNC: 259 MG/DL — HIGH (ref 70–99)
GLUCOSE BLDC GLUCOMTR-MCNC: 291 MG/DL — HIGH (ref 70–99)
GLUCOSE SERPL-MCNC: 181 MG/DL — HIGH (ref 70–99)
GRAM STN FLD: SIGNIFICANT CHANGE UP
HCT VFR BLD CALC: 28 % — LOW (ref 34.5–45)
HGB BLD-MCNC: 8.5 G/DL — LOW (ref 11.5–15.5)
INR BLD: 1.19 RATIO — HIGH (ref 0.88–1.16)
MAGNESIUM SERPL-MCNC: 1.9 MG/DL — SIGNIFICANT CHANGE UP (ref 1.6–2.6)
MCHC RBC-ENTMCNC: 28.6 PG — SIGNIFICANT CHANGE UP (ref 27–34)
MCHC RBC-ENTMCNC: 30.4 GM/DL — LOW (ref 32–36)
MCV RBC AUTO: 94.3 FL — SIGNIFICANT CHANGE UP (ref 80–100)
NRBC # BLD: 0 /100 WBCS — SIGNIFICANT CHANGE UP (ref 0–0)
PHOSPHATE SERPL-MCNC: 2.9 MG/DL — SIGNIFICANT CHANGE UP (ref 2.5–4.5)
PLATELET # BLD AUTO: 379 K/UL — SIGNIFICANT CHANGE UP (ref 150–400)
POTASSIUM SERPL-MCNC: 4.2 MMOL/L — SIGNIFICANT CHANGE UP (ref 3.5–5.3)
POTASSIUM SERPL-SCNC: 4.2 MMOL/L — SIGNIFICANT CHANGE UP (ref 3.5–5.3)
PROT SERPL-MCNC: 6.6 G/DL — SIGNIFICANT CHANGE UP (ref 6–8.3)
PROTHROM AB SERPL-ACNC: 13.8 SEC — HIGH (ref 10.5–13.4)
RBC # BLD: 2.97 M/UL — LOW (ref 3.8–5.2)
RBC # FLD: 13.9 % — SIGNIFICANT CHANGE UP (ref 10.3–14.5)
SODIUM SERPL-SCNC: 138 MMOL/L — SIGNIFICANT CHANGE UP (ref 135–145)
SPECIMEN SOURCE: SIGNIFICANT CHANGE UP
SPECIMEN SOURCE: SIGNIFICANT CHANGE UP
WBC # BLD: 9.69 K/UL — SIGNIFICANT CHANGE UP (ref 3.8–10.5)
WBC # FLD AUTO: 9.69 K/UL — SIGNIFICANT CHANGE UP (ref 3.8–10.5)

## 2022-07-14 PROCEDURE — 99291 CRITICAL CARE FIRST HOUR: CPT | Mod: GC

## 2022-07-14 RX ORDER — NOREPINEPHRINE BITARTRATE/D5W 8 MG/250ML
0.05 PLASTIC BAG, INJECTION (ML) INTRAVENOUS
Qty: 8 | Refills: 0 | Status: DISCONTINUED | OUTPATIENT
Start: 2022-07-14 | End: 2022-07-15

## 2022-07-14 RX ORDER — DEXMEDETOMIDINE HYDROCHLORIDE IN 0.9% SODIUM CHLORIDE 4 UG/ML
0.2 INJECTION INTRAVENOUS
Qty: 200 | Refills: 0 | Status: DISCONTINUED | OUTPATIENT
Start: 2022-07-14 | End: 2022-07-17

## 2022-07-14 RX ORDER — MAGNESIUM SULFATE 500 MG/ML
1 VIAL (ML) INJECTION ONCE
Refills: 0 | Status: COMPLETED | OUTPATIENT
Start: 2022-07-14 | End: 2022-07-14

## 2022-07-14 RX ORDER — MULTIVIT WITH MIN/MFOLATE/K2 340-15/3 G
1 POWDER (GRAM) ORAL ONCE
Refills: 0 | Status: COMPLETED | OUTPATIENT
Start: 2022-07-14 | End: 2022-07-14

## 2022-07-14 RX ORDER — LACTULOSE 10 G/15ML
20 SOLUTION ORAL THREE TIMES A DAY
Refills: 0 | Status: DISCONTINUED | OUTPATIENT
Start: 2022-07-14 | End: 2022-07-15

## 2022-07-14 RX ORDER — KETAMINE HYDROCHLORIDE 100 MG/ML
0.25 INJECTION INTRAMUSCULAR; INTRAVENOUS
Qty: 1000 | Refills: 0 | Status: DISCONTINUED | OUTPATIENT
Start: 2022-07-14 | End: 2022-07-15

## 2022-07-14 RX ORDER — FUROSEMIDE 40 MG
40 TABLET ORAL ONCE
Refills: 0 | Status: COMPLETED | OUTPATIENT
Start: 2022-07-14 | End: 2022-07-14

## 2022-07-14 RX ORDER — HUMAN INSULIN 100 [IU]/ML
2 INJECTION, SUSPENSION SUBCUTANEOUS EVERY 6 HOURS
Refills: 0 | Status: DISCONTINUED | OUTPATIENT
Start: 2022-07-14 | End: 2022-07-15

## 2022-07-14 RX ORDER — ACETAMINOPHEN 500 MG
650 TABLET ORAL EVERY 6 HOURS
Refills: 0 | Status: DISCONTINUED | OUTPATIENT
Start: 2022-07-14 | End: 2022-07-15

## 2022-07-14 RX ADMIN — Medication 650 MILLIGRAM(S): at 11:02

## 2022-07-14 RX ADMIN — CHLORHEXIDINE GLUCONATE 15 MILLILITER(S): 213 SOLUTION TOPICAL at 05:43

## 2022-07-14 RX ADMIN — Medication 1 BOTTLE: at 21:40

## 2022-07-14 RX ADMIN — HEPARIN SODIUM 7500 UNIT(S): 5000 INJECTION INTRAVENOUS; SUBCUTANEOUS at 13:57

## 2022-07-14 RX ADMIN — PROPOFOL 10.2 MICROGRAM(S)/KG/MIN: 10 INJECTION, EMULSION INTRAVENOUS at 08:09

## 2022-07-14 RX ADMIN — SERTRALINE 50 MILLIGRAM(S): 25 TABLET, FILM COATED ORAL at 12:04

## 2022-07-14 RX ADMIN — FENTANYL CITRATE 16.9 MICROGRAM(S)/KG/HR: 50 INJECTION INTRAVENOUS at 08:09

## 2022-07-14 RX ADMIN — Medication 6: at 12:20

## 2022-07-14 RX ADMIN — DEXMEDETOMIDINE HYDROCHLORIDE IN 0.9% SODIUM CHLORIDE 8.46 MICROGRAM(S)/KG/HR: 4 INJECTION INTRAVENOUS at 10:33

## 2022-07-14 RX ADMIN — PANTOPRAZOLE SODIUM 40 MILLIGRAM(S): 20 TABLET, DELAYED RELEASE ORAL at 12:03

## 2022-07-14 RX ADMIN — HEPARIN SODIUM 7500 UNIT(S): 5000 INJECTION INTRAVENOUS; SUBCUTANEOUS at 21:40

## 2022-07-14 RX ADMIN — CHLORHEXIDINE GLUCONATE 15 MILLILITER(S): 213 SOLUTION TOPICAL at 17:14

## 2022-07-14 RX ADMIN — LACTULOSE 20 GRAM(S): 10 SOLUTION ORAL at 21:39

## 2022-07-14 RX ADMIN — POLYETHYLENE GLYCOL 3350 17 GRAM(S): 17 POWDER, FOR SOLUTION ORAL at 00:51

## 2022-07-14 RX ADMIN — LACTULOSE 20 GRAM(S): 10 SOLUTION ORAL at 12:03

## 2022-07-14 RX ADMIN — PIPERACILLIN AND TAZOBACTAM 25 GRAM(S): 4; .5 INJECTION, POWDER, LYOPHILIZED, FOR SOLUTION INTRAVENOUS at 09:44

## 2022-07-14 RX ADMIN — Medication 40 MILLIGRAM(S): at 21:40

## 2022-07-14 RX ADMIN — Medication 2: at 00:34

## 2022-07-14 RX ADMIN — NALOXEGOL OXALATE 25 MILLIGRAM(S): 12.5 TABLET, FILM COATED ORAL at 12:03

## 2022-07-14 RX ADMIN — KETAMINE HYDROCHLORIDE 4.23 MG/KG/HR: 100 INJECTION INTRAMUSCULAR; INTRAVENOUS at 08:09

## 2022-07-14 RX ADMIN — Medication 6: at 17:12

## 2022-07-14 RX ADMIN — PIPERACILLIN AND TAZOBACTAM 25 GRAM(S): 4; .5 INJECTION, POWDER, LYOPHILIZED, FOR SOLUTION INTRAVENOUS at 17:04

## 2022-07-14 RX ADMIN — Medication 650 MILLIGRAM(S): at 10:02

## 2022-07-14 RX ADMIN — CHLORHEXIDINE GLUCONATE 1 APPLICATION(S): 213 SOLUTION TOPICAL at 05:47

## 2022-07-14 RX ADMIN — KETAMINE HYDROCHLORIDE 4.23 MG/KG/HR: 100 INJECTION INTRAMUSCULAR; INTRAVENOUS at 05:46

## 2022-07-14 RX ADMIN — HEPARIN SODIUM 7500 UNIT(S): 5000 INJECTION INTRAVENOUS; SUBCUTANEOUS at 05:47

## 2022-07-14 RX ADMIN — PIPERACILLIN AND TAZOBACTAM 25 GRAM(S): 4; .5 INJECTION, POWDER, LYOPHILIZED, FOR SOLUTION INTRAVENOUS at 00:51

## 2022-07-14 RX ADMIN — POLYETHYLENE GLYCOL 3350 17 GRAM(S): 17 POWDER, FOR SOLUTION ORAL at 12:02

## 2022-07-14 RX ADMIN — Medication 2: at 05:47

## 2022-07-14 RX ADMIN — Medication 100 GRAM(S): at 06:37

## 2022-07-14 RX ADMIN — SENNA PLUS 5 MILLILITER(S): 8.6 TABLET ORAL at 21:54

## 2022-07-14 NOTE — CONSULT NOTE ADULT - ASSESSMENT
52F s/p SCS trial on 7/8 by Dr. Eagle Villasenor at [facility]. Admitted to MICU for ARDS requiring prone positioning 2/2 multifocal aspiration PNA and fever. CT CAP shows no significant collection or likely source of infection around SCS leads. Exam: Intubated, sedated w/ propofol/ketamine  -Consulted by MICU for consideration of taking out SCS leads  -Patient desatted when turned over on side when SCS battery pack was examined  -No urgent need for SCS extraction 52F s/p SCS trial on 7/8 by Dr. Eagle Villasenor at [facility]. Admitted to MICU for ARDS requiring prone positioning 2/2 multifocal aspiration PNA and fever. CT CAP shows no significant collection or likely source of infection around SCS leads. Exam: Intubated, sedated w/ propofol/ketamine  -Consulted by MICU for consideration of taking out SCS leads  -Patient desatted when turned over on side when SCS battery pack was examined  -Will extract epidural SCS leads, as trial period (usually 5 days) has ended

## 2022-07-14 NOTE — PROGRESS NOTE ADULT - ASSESSMENT
52F s/p SCS trial on 7/8 by Dr. Eagle Villasenor at [facility]. Admitted to MICU for ARDS requiring prone positioning 2/2 multifocal aspiration PNA and fever. Also found to have human metapneumovirus. But fever and WBC numbers improving. 7/11 BCx NGTD. CT CAP shows no significant collection or likely source of infection around SCS leads. Exam: Sedated with Nimbex. Pupils 3R    -Plan for possible removal of SCS leads at bedside today. To be discussed with attending.

## 2022-07-14 NOTE — PROGRESS NOTE ADULT - CRITICAL CARE ATTENDING COMMENT
Attending Attestation:    Patient seen and examined with resident/fellow.  Agree with above except as noted.  51 yo female morbidly obese, with chronic  back pain syndrome ,s/p spinal pain stimulator 2 days prior to admission, now with acute hypoxemic respiratory failure due to aspiration pneumonia.  RVP positive for metapneumonovirus.    1. Acute hypoxemic respiratory failure due to aspiration pneumonia. S/P spinal stimulator insertion done under conscious sedation at  [facility] 2 days prior to admission.  CT chest shows bilateral R> L  multilobar consolidation.  Pt proned last night with good improvement . Now supined and tolerating well.         1.Continue supine position. Plateau pressure 28-29. FIO2 30% PEEP 12.         2. Stop Nimbex.         3. Continue Fentanyl and Propofol.  Ketamine changed to Precedex         4.Continue with Vancomycin and Zosyn for aspiration pneumonia.  MRSA nasal swab positive.         5. PS weaning as tolerated           2. JUDD. Pt on pressors . Likely ATN . Follow UO. Creatinine decreasing.  3. DVT prophylaxis  Continue sq heparin  4. Fever. Repeat blood and sputum cx.                This patient is critically ill and required frequent bedside visits with therapy change.  Total critical care time spent was 35__ minutes.

## 2022-07-14 NOTE — PROGRESS NOTE ADULT - SUBJECTIVE AND OBJECTIVE BOX
INTERVAL HPI/OVERNIGHT EVENTS:    SUBJECTIVE: Patient seen and examined at bedside.       VITAL SIGNS:  ICU Vital Signs Last 24 Hrs  T(C): 37.8 (14 Jul 2022 00:00), Max: 37.8 (14 Jul 2022 00:00)  T(F): 100 (14 Jul 2022 00:00), Max: 100 (14 Jul 2022 00:00)  HR: 59 (14 Jul 2022 03:29) (47 - 74)  BP: --  BP(mean): --  ABP: 118/49 (14 Jul 2022 02:45) (99/63 - 144/60)  ABP(mean): 70 (14 Jul 2022 02:45) (64 - 96)  RR: 26 (14 Jul 2022 02:45) (25 - 27)  SpO2: 100% (14 Jul 2022 03:29) (96% - 100%)    O2 Parameters below as of 13 Jul 2022 20:00  Patient On (Oxygen Delivery Method): ventilator    O2 Concentration (%): 40      Mode: AC/ CMV (Assist Control/ Continuous Mandatory Ventilation), RR (machine): 26, TV (machine): 450, FiO2: 30, PEEP: 12, ITime: 1, MAP: 17, PIP: 30  Plateau pressure:   P/F ratio:     07-12 @ 07:01 - 07-13 @ 07:00  --------------------------------------------------------  IN: 3135.9 mL / OUT: 1955 mL / NET: 1180.9 mL    07-13 @ 07:01 - 07-14 @ 05:54  --------------------------------------------------------  IN: 2200.4 mL / OUT: 1335 mL / NET: 865.4 mL      CAPILLARY BLOOD GLUCOSE      POCT Blood Glucose.: 192 mg/dL (14 Jul 2022 05:38)      ECG:    PHYSICAL EXAM:    General: sedated and intubated  HEENT:   Neck:   Respiratory:   Cardiovascular:   Abdomen:   Extremities:  Neurological:    MEDICATIONS:  MEDICATIONS  (STANDING):  chlorhexidine 0.12% Liquid 15 milliLiter(s) Oral Mucosa every 12 hours  chlorhexidine 4% Liquid 1 Application(s) Topical <User Schedule>  cisatracurium Infusion 3 MICROgram(s)/kG/Min (30.5 mL/Hr) IV Continuous <Continuous>  fentaNYL   Infusion... 2 MICROgram(s)/kG/Hr (16.9 mL/Hr) IV Continuous <Continuous>  glucagon  Injectable 1 milliGRAM(s) IntraMuscular once  heparin   Injectable 7500 Unit(s) SubCutaneous every 8 hours  insulin lispro (ADMELOG) corrective regimen sliding scale   SubCutaneous every 6 hours  ketamine Infusion. 0.25 mG/kG/Hr (4.23 mL/Hr) IV Continuous <Continuous>  naloxegol 25 milliGRAM(s) Oral daily  norepinephrine Infusion 0.08 MICROgram(s)/kG/Min (25.4 mL/Hr) IV Continuous <Continuous>  pantoprazole  Injectable 40 milliGRAM(s) IV Push daily  piperacillin/tazobactam IVPB.. 3.375 Gram(s) IV Intermittent every 8 hours  polyethylene glycol 3350 17 Gram(s) Oral every 12 hours  propofol Infusion 10 MICROgram(s)/kG/Min (10.2 mL/Hr) IV Continuous <Continuous>  senna Syrup 5 milliLiter(s) Oral at bedtime  sertraline 50 milliGRAM(s) Oral daily    MEDICATIONS  (PRN):  fentaNYL    Injectable 50 MICROGram(s) IV Push every 15 minutes PRN uptitration      ALLERGIES:  Allergies    amitriptyline (Other)    Intolerances        LABS:                        8.5    9.69  )-----------( 379      ( 14 Jul 2022 00:22 )             28.0     07-14    138  |  106  |  16  ----------------------------<  181<H>  4.2   |  15<L>  |  0.89    Ca    7.9<L>      14 Jul 2022 00:22  Phos  2.9     07-14  Mg     1.9     07-14    TPro  6.6  /  Alb  2.5<L>  /  TBili  0.4  /  DBili  x   /  AST  65<H>  /  ALT  58<H>  /  AlkPhos  193<H>  07-14    PT/INR - ( 14 Jul 2022 00:22 )   PT: 13.8 sec;   INR: 1.19 ratio         PTT - ( 14 Jul 2022 00:22 )  PTT:28.6 sec    ABG:  pH, Arterial: 7.33 (07-14-22 @ 00:07)  pCO2, Arterial: 40 mmHg (07-14-22 @ 00:07)  pO2, Arterial: 90 mmHg (07-14-22 @ 00:07)  pH, Arterial: 7.34 (07-13-22 @ 15:23)  pCO2, Arterial: 40 mmHg (07-13-22 @ 15:23)  pO2, Arterial: 64 mmHg (07-13-22 @ 15:23)  pH, Arterial: 7.31 (07-13-22 @ 11:46)  pCO2, Arterial: 40 mmHg (07-13-22 @ 11:46)  pO2, Arterial: 62 mmHg (07-13-22 @ 11:46)  pH, Arterial: 7.33 (07-13-22 @ 08:24)  pCO2, Arterial: 38 mmHg (07-13-22 @ 08:24)  pO2, Arterial: 67 mmHg (07-13-22 @ 08:24)  pH, Arterial: 7.33 (07-13-22 @ 06:28)  pCO2, Arterial: 40 mmHg (07-13-22 @ 06:28)  pO2, Arterial: 60 mmHg (07-13-22 @ 06:28)      vBG:  pH, Venous: 7.30 (07-13-22 @ 21:12)  pCO2, Venous: 45 mmHg (07-13-22 @ 21:12)  pO2, Venous: 52 mmHg (07-13-22 @ 21:12)  HCO3, Venous: 22 mmol/L (07-13-22 @ 21:12)    Micro:    Culture - Blood (collected 07-12-22 @ 02:29)  Source: .Blood Blood-Peripheral  Preliminary Report (07-13-22 @ 09:01):    No growth to date.    Culture - Blood (collected 07-12-22 @ 02:29)  Source: .Blood Blood-Peripheral  Preliminary Report (07-13-22 @ 09:01):    No growth to date.    Culture - Blood (collected 07-10-22 @ 14:35)  Source: .Blood Blood-Peripheral  Preliminary Report (07-11-22 @ 23:02):    No growth to date.    Culture - Blood (collected 07-10-22 @ 14:20)  Source: .Blood Blood-Peripheral  Preliminary Report (07-11-22 @ 23:02):    No growth to date.        Culture - Sputum (collected 07-11-22 @ 20:36)  Source: .Sputum Sputum  Gram Stain (07-12-22 @ 06:41):    Rare polymorphonuclear leukocytes per low power field    No Squamous epithelial cells per low power field    No organisms seen per oil power field  Final Report (07-13-22 @ 16:42):    Normal Respiratory Brittany present        RADIOLOGY & ADDITIONAL TESTS: Reviewed.   INTERVAL HPI/OVERNIGHT EVENTS:  - decreasing FiO2 reqs and improving lung compliance   - Levo weaned off   SUBJECTIVE: Patient seen and examined at bedside.       VITAL SIGNS:  ICU Vital Signs Last 24 Hrs  T(C): 37.8 (14 Jul 2022 00:00), Max: 37.8 (14 Jul 2022 00:00)  T(F): 100 (14 Jul 2022 00:00), Max: 100 (14 Jul 2022 00:00)  HR: 59 (14 Jul 2022 03:29) (47 - 74)  BP: --  BP(mean): --  ABP: 118/49 (14 Jul 2022 02:45) (99/63 - 144/60)  ABP(mean): 70 (14 Jul 2022 02:45) (64 - 96)  RR: 26 (14 Jul 2022 02:45) (25 - 27)  SpO2: 100% (14 Jul 2022 03:29) (96% - 100%)    O2 Parameters below as of 13 Jul 2022 20:00  Patient On (Oxygen Delivery Method): ventilator    O2 Concentration (%): 30      Mode: AC/ CMV (Assist Control/ Continuous Mandatory Ventilation), RR (machine): 26, TV (machine): 450, FiO2: 30, PEEP: 12, ITime: 1, MAP: 17, PIP: 30  Plateau pressure:   P/F ratio:     07-12 @ 07:01  -  07-13 @ 07:00  --------------------------------------------------------  IN: 3135.9 mL / OUT: 1955 mL / NET: 1180.9 mL    07-13 @ 07:01  -  07-14 @ 05:54  --------------------------------------------------------  IN: 2200.4 mL / OUT: 1335 mL / NET: 865.4 mL      CAPILLARY BLOOD GLUCOSE      POCT Blood Glucose.: 192 mg/dL (14 Jul 2022 05:38)      PHYSICAL EXAM:    GENERAL: intubated, sedated  HEAD:  Atraumatic. Normocephalic.  EYES: pupils pinpoint but equal. Normal conjunctiva/sclera.  CARDIAC: +distance heart sounds. Regular rhythm w/o m, r,g   LUNG/CHEST: distant but clear breath sounds bilat. No wheezes. No rales. No rhonchi.  POCUS showed scattered B lines bilat with bibasilar consolidations w/o effusions   ABDOMEN: Soft. No tenderness. No distension. No fluid wave. Normal bowel sounds.  BACK: battery pack and bloody tape in thoracic midline of spine. Site is not erythematous or indurated and without drainage.   VASCULAR: +2 b/l radial or ulnar pulses. Palpable DP pulses.  EXTREMITIES:  No clubbing. No cyanosis. +2 b/l LE nonpitting edema unchanged.   NEUROLOGY: moving all extremities but not following commands     MEDICATIONS:  MEDICATIONS  (STANDING):  chlorhexidine 0.12% Liquid 15 milliLiter(s) Oral Mucosa every 12 hours  chlorhexidine 4% Liquid 1 Application(s) Topical <User Schedule>  cisatracurium Infusion 3 MICROgram(s)/kG/Min (30.5 mL/Hr) IV Continuous <Continuous>  fentaNYL   Infusion... 2 MICROgram(s)/kG/Hr (16.9 mL/Hr) IV Continuous <Continuous>  glucagon  Injectable 1 milliGRAM(s) IntraMuscular once  heparin   Injectable 7500 Unit(s) SubCutaneous every 8 hours  insulin lispro (ADMELOG) corrective regimen sliding scale   SubCutaneous every 6 hours  ketamine Infusion. 0.25 mG/kG/Hr (4.23 mL/Hr) IV Continuous <Continuous>  naloxegol 25 milliGRAM(s) Oral daily  norepinephrine Infusion 0.08 MICROgram(s)/kG/Min (25.4 mL/Hr) IV Continuous <Continuous>  pantoprazole  Injectable 40 milliGRAM(s) IV Push daily  piperacillin/tazobactam IVPB.. 3.375 Gram(s) IV Intermittent every 8 hours  polyethylene glycol 3350 17 Gram(s) Oral every 12 hours  propofol Infusion 10 MICROgram(s)/kG/Min (10.2 mL/Hr) IV Continuous <Continuous>  senna Syrup 5 milliLiter(s) Oral at bedtime  sertraline 50 milliGRAM(s) Oral daily    MEDICATIONS  (PRN):  fentaNYL    Injectable 50 MICROGram(s) IV Push every 15 minutes PRN uptitration      ALLERGIES:  Allergies    amitriptyline (Other)    Intolerances        LABS:                        8.5    9.69  )-----------( 379      ( 14 Jul 2022 00:22 )             28.0     07-14    138  |  106  |  16  ----------------------------<  181<H>  4.2   |  15<L>  |  0.89    Ca    7.9<L>      14 Jul 2022 00:22  Phos  2.9     07-14  Mg     1.9     07-14    TPro  6.6  /  Alb  2.5<L>  /  TBili  0.4  /  DBili  x   /  AST  65<H>  /  ALT  58<H>  /  AlkPhos  193<H>  07-14    PT/INR - ( 14 Jul 2022 00:22 )   PT: 13.8 sec;   INR: 1.19 ratio         PTT - ( 14 Jul 2022 00:22 )  PTT:28.6 sec    ABG:  pH, Arterial: 7.33 (07-14-22 @ 00:07)  pCO2, Arterial: 40 mmHg (07-14-22 @ 00:07)  pO2, Arterial: 90 mmHg (07-14-22 @ 00:07)  pH, Arterial: 7.34 (07-13-22 @ 15:23)  pCO2, Arterial: 40 mmHg (07-13-22 @ 15:23)  pO2, Arterial: 64 mmHg (07-13-22 @ 15:23)  pH, Arterial: 7.31 (07-13-22 @ 11:46)  pCO2, Arterial: 40 mmHg (07-13-22 @ 11:46)  pO2, Arterial: 62 mmHg (07-13-22 @ 11:46)  pH, Arterial: 7.33 (07-13-22 @ 08:24)  pCO2, Arterial: 38 mmHg (07-13-22 @ 08:24)  pO2, Arterial: 67 mmHg (07-13-22 @ 08:24)  pH, Arterial: 7.33 (07-13-22 @ 06:28)  pCO2, Arterial: 40 mmHg (07-13-22 @ 06:28)  pO2, Arterial: 60 mmHg (07-13-22 @ 06:28)      vBG:  pH, Venous: 7.30 (07-13-22 @ 21:12)  pCO2, Venous: 45 mmHg (07-13-22 @ 21:12)  pO2, Venous: 52 mmHg (07-13-22 @ 21:12)  HCO3, Venous: 22 mmol/L (07-13-22 @ 21:12)    Micro:    Culture - Blood (collected 07-12-22 @ 02:29)  Source: .Blood Blood-Peripheral  Preliminary Report (07-13-22 @ 09:01):    No growth to date.    Culture - Blood (collected 07-12-22 @ 02:29)  Source: .Blood Blood-Peripheral  Preliminary Report (07-13-22 @ 09:01):    No growth to date.    Culture - Blood (collected 07-10-22 @ 14:35)  Source: .Blood Blood-Peripheral  Preliminary Report (07-11-22 @ 23:02):    No growth to date.    Culture - Blood (collected 07-10-22 @ 14:20)  Source: .Blood Blood-Peripheral  Preliminary Report (07-11-22 @ 23:02):    No growth to date.        Culture - Sputum (collected 07-11-22 @ 20:36)  Source: .Sputum Sputum  Gram Stain (07-12-22 @ 06:41):    Rare polymorphonuclear leukocytes per low power field    No Squamous epithelial cells per low power field    No organisms seen per oil power field  Final Report (07-13-22 @ 16:42):    Normal Respiratory Brittany present        RADIOLOGY & ADDITIONAL TESTS: Reviewed.

## 2022-07-14 NOTE — PROGRESS NOTE ADULT - ASSESSMENT
Fauzia Linares is a 52 year old with  CRPS, MO, DM2, HTN, HTN, IBS, chronic back pain, pseudotumor cerebri, recent spinal nerve stimulator placed (7/8/22 @ [facility]) who initially presented with hypoxic respiratory failure, found to have multifocal pna 2/2 aspiration pneumonia now with increased work of breathing on bipap admitted to MICU. Now s/p intubated for hypoxic resp failure on bipap and with severe ARDS likely 2/2 Asp PNA    #Neuro  Sedated and paralyzed   //s/p spinal stimulator w/ chornic back pain, CRPS  - on prop 40, fentanyl 2.5, Nimbex at 1  - versed now weaned off   - cont to titrate nimbex to TOF 3/4  - if requiring further sedation on nimbex will give PRN pushes of Versed   -  tylenol q6hrs and cooling blanket for persistent fevers   - spinal stimulator seems non-infectious on physical exam and unlikely to be nidus of infx/persistent fevers. will cont to monitor.   - cont home sertraline     #Cardiovascular  //Sepsis, vasoplegia 2/2 sedation  - Levo 0.05 2/2 vasoplegia from sedation. will cont to titrate for MAP 65  - will cont to hold home nifedipineXL, and hold lisinopril, lasix     #Respiratory  //Severe ARDS likely 2/2 bilat asp pna   - CT demonstrating significant bilat pna   - /24/15/100% satting 90%  - P/F ratio worsening from 76-->62, with worsening lung compliance (Pplat 37 w/ driving pressure 22)  - Gas 7.24/44/62 on 100% fio2  - Will prone and keep paralyzed to improve oxygenation/atelectasis/ V/Q matching   - bronchoscopy peformed. will f/u BAL       #GI/Nutrition  //hx IBS   - currently TF at trickle feeding while proned. Then will inc to goal of 70cc/hr for 18hrs   - cont home protonix   - bowel regimen and movantik  - will hold home bentyl, simethicone, loperamide w/ fentanyl gtt on     #/Renal  - Crn inc from 0.9 to 1.2   - making good UO      #ID  //Asp Pna, persistent fevers   - febrile to 104.1. still febrile but fever curve decreasing   - cont tylenol q6 and give an extra 2 doses of 1gm of tylenol today, LFTS nl, but will cont to monitor   - cont zosyn/vanc for 7 day course for asp pna. completed course of 3 days of Zithro.   - f/u Ucx, BAL and MRSA swab and narrow abx accordingly   - bcx negx2, sputum cx neg, UA, RVP neg     #Endocrine  //T2DM  -  FS well controlled   - goal BG<180  - cont w/ sliding scale q6hr while NPO     #Hematologic/DVT ppx  - c/w heparin 7500 q8 subQ for DVT ppx    #Ethics  full code Fauzia Linares is a 52 year old with  CRPS, MO, DM2, HTN, HTN, IBS, chronic back pain, pseudotumor cerebri, recent spinal nerve stimulator placed (7/8/22 @ [facility]) who initially presented with hypoxic respiratory failure, found to have multifocal pna 2/2 aspiration pneumonia now with increased work of breathing on bipap admitted to MICU. Now s/p intubated for hypoxic resp failure on bipap and with severe ARDS likely 2/2 Asp PNA    #Neuro  Sedated    //s/p spinal stimulator w/ chornic back pain, CRPS  - on prop, fentanyl, and ketamine. Off nimbex  - will dec fentanyl, add precedex and dec ketamine/prop   - will SBT if fevers resolve and initiating breaths   -  tylenol q6hrs  for persistent fevers   - spinal stimulator seems non-infectious on physical exam and unlikely to be nidus of infx/persistent fevers. will cont to monitor. No need to pull stimulator as per neurosurg  - cont home sertraline     #Cardiovascular  //Sepsis, vasoplegia 2/2 sedation  - Levo back on this AM 2/2 vasoplegia from sedation. will cont to titrate for MAP 65  - will cont to hold home nifedipineXL, and hold lisinopril, lasix     #Respiratory  //Severe ARDS likely 2/2 bilat asp pna   - CT demonstrating significant bilat pna   - /26/12/30% satting 95%  - P/F ratio improving to 225 with improvement in  lung compliance (Pplat 25 w/ driving pressure 13)  - Gas still demonstrating met acidosis   - will keep supine with improvement in ARDS   - bronchoscopy peformed. BAL neg x1, will f/u       #GI/Nutrition  //hx IBS   - currently TF at [employer]. Will hold while giving lactulose TID for lack of BM    - cont home protonix   - bowel regimen and movantik, fentanyl gtt dec to 1, giving lactulose 20mg TID  - will hold home bentyl, simethicone, loperamide w/ fentanyl gtt on     #/Renal  //NAGMA   - Crn improving    - making good UO  - persistent NAGMA w/ bicarb dec from 19 to 15 w/ nl AG and pH 7.33/ PaCO2 40   - mild JUDD improving, no hx of diarrhea, and no Normal saline use since admitted. unclear etiology of NAGMA  - will cont to trend and allow permissive hypercapnia/mild acidosis given ARDS       #ID  //Asp Pna, persistent fevers + hMVP  - intermittent fevers   - cont tylenol q6 PRN for feverrs   -RVP + for hMVP  - all cx neg to date   - will reculture today  - cont zosyn 7 day course for asp pna. completed course of 3 days of Zithro. Vanc d/neeta with neg MRSA   - BAL negx1  - f/u second set BAL and abx accordingly     #Endocrine  //T2DM  -  FS well controlled   - goal BG<180  - cont w/ sliding scale q6hr while NPO     #Hematologic/DVT ppx  - c/w heparin 7500 q8 subQ for DVT ppx    #Ethics  full code

## 2022-07-14 NOTE — CONSULT NOTE ADULT - SUBJECTIVE AND OBJECTIVE BOX
p (1480)     HPI:  52F s/p SCS trial on 7/8 by Dr. Eagle Villasenor at [facility]. Admitted to MICU for ARDS requiring prone positioning 2/2 multifocal aspiration PNA and fever. CT CAP shows no significant collection or likely source of infection around SCS leads. Exam: Intubated, sedated w/ propofol/ketamine    --Anticoagulation:  heparin   Injectable 7500 Unit(s) SubCutaneous every 8 hours    =====================  PAST MEDICAL HISTORY   Diabetes    HTN (hypertension)    Neuropathy    Obesity    Former cigarette smoker    Marijuana smoker, continuous    Neuralgia    Cardiac abnormality    Diabetes    Essential hypertension    IBS (irritable bowel syndrome)    Complex regional pain syndrome type 1      PAST SURGICAL HISTORY   History of cholecystectomy    History of hand surgery    No significant past surgical history      amitriptyline (Other)      MEDICATIONS:  Antibiotics:  piperacillin/tazobactam IVPB.. 3.375 Gram(s) IV Intermittent every 8 hours    Neuro:  acetaminophen    Suspension .. 650 milliGRAM(s) Oral every 6 hours PRN  cisatracurium Infusion 3 MICROgram(s)/kG/Min IV Continuous <Continuous>  dexMEDEtomidine Infusion 0.2 MICROgram(s)/kG/Hr IV Continuous <Continuous>  fentaNYL    Injectable 50 MICROGram(s) IV Push every 15 minutes PRN  fentaNYL   Infusion... 1 MICROgram(s)/kG/Hr IV Continuous <Continuous>  ketamine Infusion. 0.25 mG/kG/Hr IV Continuous <Continuous>  propofol Infusion 10 MICROgram(s)/kG/Min IV Continuous <Continuous>  sertraline 50 milliGRAM(s) Oral daily    Other:  glucagon  Injectable 1 milliGRAM(s) IntraMuscular once  insulin lispro (ADMELOG) corrective regimen sliding scale   SubCutaneous every 6 hours  lactulose Syrup 20 Gram(s) Oral three times a day  naloxegol 25 milliGRAM(s) Oral daily  norepinephrine Infusion 0.08 MICROgram(s)/kG/Min IV Continuous <Continuous>  pantoprazole  Injectable 40 milliGRAM(s) IV Push daily  polyethylene glycol 3350 17 Gram(s) Oral every 12 hours  senna Syrup 5 milliLiter(s) Oral at bedtime      SOCIAL HISTORY:   Occupation:   Marital Status:     FAMILY HISTORY:  No pertinent family history in first degree relatives    No pertinent family history in first degree relatives        ROS: Negative except per HPI    LABS:  PT/INR - ( 14 Jul 2022 00:22 )   PT: 13.8 sec;   INR: 1.19 ratio         PTT - ( 14 Jul 2022 00:22 )  PTT:28.6 sec                        8.5    9.69  )-----------( 379      ( 14 Jul 2022 00:22 )             28.0     07-14    138  |  106  |  16  ----------------------------<  181<H>  4.2   |  15<L>  |  0.89    Ca    7.9<L>      14 Jul 2022 00:22  Phos  2.9     07-14  Mg     1.9     07-14    TPro  6.6  /  Alb  2.5<L>  /  TBili  0.4  /  DBili  x   /  AST  65<H>  /  ALT  58<H>  /  AlkPhos  193<H>  07-14

## 2022-07-14 NOTE — PROGRESS NOTE ADULT - SUBJECTIVE AND OBJECTIVE BOX
Patient seen and examined at bedside.    --Anticoagulation--  heparin   Injectable 7500 Unit(s) SubCutaneous every 8 hours    T(C): 37.7 (07-14-22 @ 04:00), Max: 37.8 (07-14-22 @ 00:00)  HR: 74 (07-14-22 @ 08:30) (50 - 91)  BP: --  RR: 26 (07-14-22 @ 08:30) (25 - 27)  SpO2: 100% (07-14-22 @ 08:30) (93% - 100%)  Wt(kg): --    Exam: Sedated w/ Nimbex, pupils 3R

## 2022-07-15 LAB
ALBUMIN SERPL ELPH-MCNC: 3 G/DL — LOW (ref 3.3–5)
ALP SERPL-CCNC: 246 U/L — HIGH (ref 40–120)
ALT FLD-CCNC: 56 U/L — HIGH (ref 10–45)
ANION GAP SERPL CALC-SCNC: 14 MMOL/L — SIGNIFICANT CHANGE UP (ref 5–17)
APTT BLD: 29.5 SEC — SIGNIFICANT CHANGE UP (ref 27.5–35.5)
AST SERPL-CCNC: 46 U/L — HIGH (ref 10–40)
BILIRUB SERPL-MCNC: 0.5 MG/DL — SIGNIFICANT CHANGE UP (ref 0.2–1.2)
BUN SERPL-MCNC: 19 MG/DL — SIGNIFICANT CHANGE UP (ref 7–23)
CALCIUM SERPL-MCNC: 8.7 MG/DL — SIGNIFICANT CHANGE UP (ref 8.4–10.5)
CHLORIDE SERPL-SCNC: 103 MMOL/L — SIGNIFICANT CHANGE UP (ref 96–108)
CO2 SERPL-SCNC: 18 MMOL/L — LOW (ref 22–31)
CREAT SERPL-MCNC: 0.97 MG/DL — SIGNIFICANT CHANGE UP (ref 0.5–1.3)
CULTURE RESULTS: SIGNIFICANT CHANGE UP
CULTURE RESULTS: SIGNIFICANT CHANGE UP
EGFR: 70 ML/MIN/1.73M2 — SIGNIFICANT CHANGE UP
GAS PNL BLDA: SIGNIFICANT CHANGE UP
GAS PNL BLDA: SIGNIFICANT CHANGE UP
GLUCOSE BLDC GLUCOMTR-MCNC: 236 MG/DL — HIGH (ref 70–99)
GLUCOSE BLDC GLUCOMTR-MCNC: 288 MG/DL — HIGH (ref 70–99)
GLUCOSE BLDC GLUCOMTR-MCNC: 289 MG/DL — HIGH (ref 70–99)
GLUCOSE BLDC GLUCOMTR-MCNC: 291 MG/DL — HIGH (ref 70–99)
GLUCOSE SERPL-MCNC: 269 MG/DL — HIGH (ref 70–99)
HCT VFR BLD CALC: 29 % — LOW (ref 34.5–45)
HGB BLD-MCNC: 9 G/DL — LOW (ref 11.5–15.5)
INR BLD: 1.1 RATIO — SIGNIFICANT CHANGE UP (ref 0.88–1.16)
MAGNESIUM SERPL-MCNC: 2.2 MG/DL — SIGNIFICANT CHANGE UP (ref 1.6–2.6)
MCHC RBC-ENTMCNC: 28.5 PG — SIGNIFICANT CHANGE UP (ref 27–34)
MCHC RBC-ENTMCNC: 31 GM/DL — LOW (ref 32–36)
MCV RBC AUTO: 91.8 FL — SIGNIFICANT CHANGE UP (ref 80–100)
NRBC # BLD: 0 /100 WBCS — SIGNIFICANT CHANGE UP (ref 0–0)
PHOSPHATE SERPL-MCNC: 3.6 MG/DL — SIGNIFICANT CHANGE UP (ref 2.5–4.5)
PLATELET # BLD AUTO: 487 K/UL — HIGH (ref 150–400)
POTASSIUM SERPL-MCNC: 4.2 MMOL/L — SIGNIFICANT CHANGE UP (ref 3.5–5.3)
POTASSIUM SERPL-SCNC: 4.2 MMOL/L — SIGNIFICANT CHANGE UP (ref 3.5–5.3)
PROT SERPL-MCNC: 7.6 G/DL — SIGNIFICANT CHANGE UP (ref 6–8.3)
PROTHROM AB SERPL-ACNC: 12.7 SEC — SIGNIFICANT CHANGE UP (ref 10.5–13.4)
RBC # BLD: 3.16 M/UL — LOW (ref 3.8–5.2)
RBC # FLD: 13.7 % — SIGNIFICANT CHANGE UP (ref 10.3–14.5)
SODIUM SERPL-SCNC: 135 MMOL/L — SIGNIFICANT CHANGE UP (ref 135–145)
SPECIMEN SOURCE: SIGNIFICANT CHANGE UP
SPECIMEN SOURCE: SIGNIFICANT CHANGE UP
WBC # BLD: 10.85 K/UL — HIGH (ref 3.8–10.5)
WBC # FLD AUTO: 10.85 K/UL — HIGH (ref 3.8–10.5)

## 2022-07-15 PROCEDURE — 71045 X-RAY EXAM CHEST 1 VIEW: CPT | Mod: 26

## 2022-07-15 PROCEDURE — 99291 CRITICAL CARE FIRST HOUR: CPT | Mod: GC

## 2022-07-15 RX ORDER — HUMAN INSULIN 100 [IU]/ML
4 INJECTION, SUSPENSION SUBCUTANEOUS EVERY 6 HOURS
Refills: 0 | Status: DISCONTINUED | OUTPATIENT
Start: 2022-07-15 | End: 2022-07-16

## 2022-07-15 RX ORDER — IPRATROPIUM/ALBUTEROL SULFATE 18-103MCG
3 AEROSOL WITH ADAPTER (GRAM) INHALATION EVERY 6 HOURS
Refills: 0 | Status: DISCONTINUED | OUTPATIENT
Start: 2022-07-15 | End: 2022-07-23

## 2022-07-15 RX ADMIN — PROPOFOL 10.2 MICROGRAM(S)/KG/MIN: 10 INJECTION, EMULSION INTRAVENOUS at 05:54

## 2022-07-15 RX ADMIN — HUMAN INSULIN 4 UNIT(S): 100 INJECTION, SUSPENSION SUBCUTANEOUS at 17:26

## 2022-07-15 RX ADMIN — HEPARIN SODIUM 7500 UNIT(S): 5000 INJECTION INTRAVENOUS; SUBCUTANEOUS at 21:34

## 2022-07-15 RX ADMIN — Medication 6: at 17:26

## 2022-07-15 RX ADMIN — Medication 4: at 06:24

## 2022-07-15 RX ADMIN — HUMAN INSULIN 2 UNIT(S): 100 INJECTION, SUSPENSION SUBCUTANEOUS at 11:03

## 2022-07-15 RX ADMIN — Medication 6: at 00:16

## 2022-07-15 RX ADMIN — HEPARIN SODIUM 7500 UNIT(S): 5000 INJECTION INTRAVENOUS; SUBCUTANEOUS at 15:48

## 2022-07-15 RX ADMIN — DEXMEDETOMIDINE HYDROCHLORIDE IN 0.9% SODIUM CHLORIDE 8.46 MICROGRAM(S)/KG/HR: 4 INJECTION INTRAVENOUS at 21:34

## 2022-07-15 RX ADMIN — PANTOPRAZOLE SODIUM 40 MILLIGRAM(S): 20 TABLET, DELAYED RELEASE ORAL at 11:00

## 2022-07-15 RX ADMIN — HUMAN INSULIN 2 UNIT(S): 100 INJECTION, SUSPENSION SUBCUTANEOUS at 06:23

## 2022-07-15 RX ADMIN — HUMAN INSULIN 2 UNIT(S): 100 INJECTION, SUSPENSION SUBCUTANEOUS at 00:17

## 2022-07-15 RX ADMIN — PROPOFOL 10.2 MICROGRAM(S)/KG/MIN: 10 INJECTION, EMULSION INTRAVENOUS at 07:38

## 2022-07-15 RX ADMIN — CHLORHEXIDINE GLUCONATE 15 MILLILITER(S): 213 SOLUTION TOPICAL at 05:54

## 2022-07-15 RX ADMIN — POLYETHYLENE GLYCOL 3350 17 GRAM(S): 17 POWDER, FOR SOLUTION ORAL at 00:16

## 2022-07-15 RX ADMIN — PIPERACILLIN AND TAZOBACTAM 25 GRAM(S): 4; .5 INJECTION, POWDER, LYOPHILIZED, FOR SOLUTION INTRAVENOUS at 00:16

## 2022-07-15 RX ADMIN — CHLORHEXIDINE GLUCONATE 15 MILLILITER(S): 213 SOLUTION TOPICAL at 17:28

## 2022-07-15 RX ADMIN — PIPERACILLIN AND TAZOBACTAM 25 GRAM(S): 4; .5 INJECTION, POWDER, LYOPHILIZED, FOR SOLUTION INTRAVENOUS at 15:49

## 2022-07-15 RX ADMIN — SENNA PLUS 5 MILLILITER(S): 8.6 TABLET ORAL at 21:34

## 2022-07-15 RX ADMIN — CHLORHEXIDINE GLUCONATE 1 APPLICATION(S): 213 SOLUTION TOPICAL at 05:54

## 2022-07-15 RX ADMIN — HEPARIN SODIUM 7500 UNIT(S): 5000 INJECTION INTRAVENOUS; SUBCUTANEOUS at 05:54

## 2022-07-15 RX ADMIN — Medication 6: at 11:03

## 2022-07-15 RX ADMIN — PIPERACILLIN AND TAZOBACTAM 25 GRAM(S): 4; .5 INJECTION, POWDER, LYOPHILIZED, FOR SOLUTION INTRAVENOUS at 07:38

## 2022-07-15 RX ADMIN — SERTRALINE 50 MILLIGRAM(S): 25 TABLET, FILM COATED ORAL at 11:00

## 2022-07-15 NOTE — PROGRESS NOTE ADULT - ASSESSMENT
Fauzia Linares is a 52 year old with  CRPS, MO, DM2, HTN, HTN, IBS, chronic back pain, pseudotumor cerebri, recent spinal nerve stimulator placed (7/8/22 @ [facility] [place]) who initially presented with hypoxic respiratory failure, found to have multifocal pna 2/2 aspiration pneumonia now with increased work of breathing on bipap admitted to MICU. Now s/p intubated for hypoxic resp failure on bipap and with severe ARDS likely 2/2 Asp PNA    #Neuro  Sedated and paralyzed   //s/p spinal stimulator w/ chornic back pain, CRPS  - on prop 40, fentanyl 2.5, Nimbex at 1  - versed now weaned off   - cont to titrate nimbex to TOF 3/4  - if requiring further sedation on nimbex will give PRN pushes of Versed   -  tylenol q6hrs and cooling blanket for persistent fevers   - spinal stimulator seems non-infectious on physical exam and unlikely to be nidus of infx/persistent fevers. will cont to monitor.   - cont home sertraline   -spinal cord stimulator extracted on 7/14    #Cardiovascular  //Sepsis, vasoplegia 2/2 sedation  - Levo 0.05 2/2 vasoplegia from sedation. will cont to titrate for MAP 65  - will cont to hold home nifedipineXL, and hold lisinopril, lasix     #Respiratory  //Severe ARDS likely 2/2 bilat asp pna   - CT demonstrating significant bilat pna   - /24/15/100% satting 90%  - P/F ratio worsening from 76-->62, with worsening lung compliance (Pplat 37 w/ driving pressure 22)  - Gas 7.24/44/62 on 100% fio2  - Will prone and keep paralyzed to improve oxygenation/atelectasis/ V/Q matching   - bronchoscopy performed. will f/u BAL     #GI/Nutrition  //hx IBS   - currently TF at trickle feeding while proned. Then will inc to goal of 70cc/hr for 18hrs   - cont home protonix   - bowel regimen and movantik  - will hold home bentyl, simethicone, loperamide w/ fentanyl gtt on     #/Renal  - Crn inc from 0.9 to 1.2   - making good UO      #ID  //Asp Pna, persistent fevers   - febrile to 104.1. still febrile but fever curve decreasing   - cont tylenol q6 and give an extra 2 doses of 1gm of tylenol today, LFTS nl, but will cont to monitor   - cont zosyn/vanc for 7 day course for asp pna. completed course of 3 days of Zithro.   - f/u Ucx, BAL and MRSA swab and narrow abx accordingly   - bcx negx2, sputum cx neg, UA, RVP neg     #Endocrine  //T2DM  -  FS well controlled   - goal BG<180  - cont w/ sliding scale q6hr while NPO     #Hematologic/DVT ppx  - c/w heparin 7500 q8 subQ for DVT ppx    #Ethics  full code Fauzia Linares is a 52 year old with  CRPS, MO, DM2, HTN, HTN, IBS, chronic back pain, pseudotumor cerebri, recent spinal nerve stimulator placed (7/8/22 @ [facility]) who initially presented with hypoxic respiratory failure, found to have multifocal pna 2/2 aspiration pneumonia now with increased work of breathing on bipap admitted to MICU. Now s/p intubated for hypoxic resp failure on bipap and with severe ARDS likely 2/2 Asp PNA    #Neuro  Sedated and paralyzed   //s/p spinal stimulator w/ chronic back pain, CRPS  - on prop 10  - pt afebrile overnight  - cont home sertraline   -spinal cord stimulator extracted on 7/14    #Cardiovascular  //Sepsis, vasoplegia 2/2 sedation  - precedex dc/ed bc of bradycardia, weaning off prop  - will cont to hold home nifedipineXL, and hold lisinopril, lasix     #Respiratory  //Severe ARDS likely 2/2 bilat asp pna   - CT demonstrating significant bilat pna   - /26/12/30%, pt with decreasing vent requirements  - tolerating CPAP trial today, potential extubation today   -ABG with pH of 7.31, pCO2 of 39, pO2 of 92  - bronchoscopy performed. BAL with NGTD    #GI/Nutrition  //hx IBS   - hold TF currently for possible extubation today. goal of 70cc/hr for 18hrs   - cont home protonix   - bowel regimen and movantik  - will hold home bentyl, simethicone, loperamide w/ fentanyl gtt on     #/Renal  - Crn stable (0.97)  - making good UO      #ID  //Asp Pna, persistent fevers   -  pt without fever last night  - cont tylenol q6 PRN LFTS mildly elevated, will cont to monitor   - cont zosyn for 7 day course for asp pna (should finish 7/18). completed course of 3 days of Zithro.   - bcx negx2, sputum cx neg, UA, RVP neg     #Endocrine  //T2DM  -  FS well controlled   - goal BG<180  - increased NPH to 4 units q6hr    #Hematologic/DVT ppx  - c/w heparin 7500 q8 subQ for DVT ppx    #Ethics  full code

## 2022-07-15 NOTE — PROGRESS NOTE ADULT - CRITICAL CARE ATTENDING COMMENT
Attending Attestation:    Patient seen and examined with resident/fellow.  Agree with above except as noted.  53 yo female morbidly obese, with chronic  back pain syndrome ,s/p spinal pain stimulator 2 days prior to admission, now with acute hypoxemic respiratory failure due to aspiration pneumonia.  RVP positive for metapneumonovirus.    1. Acute hypoxemic respiratory failure due to aspiration pneumonia. S/P spinal stimulator insertion done under conscious sedation at  [facility] 2 days prior to admission.  CT chest shows bilateral R> L  multilobar consolidation.           1.Decrease sedation . Pt tolerating PS 5 CPAP 8. Plan to extubated to BiPAP when pt awakens.         2..Continue with Vancomycin and Zosyn for aspiration pneumonia.  MRSA nasal swab positive.                  2. JUDD. Pt on pressors . Likely ATN . Follow UO. Creatinine decreasing.  3. DVT prophylaxis  Continue sq heparin  4. Fever yesterday. Afebrile today.. Repeat blood and sputum cx negative to date.

## 2022-07-15 NOTE — PROGRESS NOTE ADULT - SUBJECTIVE AND OBJECTIVE BOX
INTERVAL HPI/OVERNIGHT EVENTS:  -IV lasix given overnight. Yesterday, Epidural spinal cord stimulator trial leads were extracted at bedside.   SUBJECTIVE: Patient seen and examined at bedside. Under heavy sedation- unarousable.     VITAL SIGNS:  ICU Vital Signs Last 24 Hrs  T(C): 36.7 (15 Jul 2022 07:00), Max: 38.2 (14 Jul 2022 12:00)  T(F): 98.1 (15 Jul 2022 07:00), Max: 100.8 (14 Jul 2022 12:00)  HR: 73 (15 Jul 2022 07:00) (44 - 91)  BP: --  BP(mean): --  ABP: 145/60 (15 Jul 2022 07:00) (85/41 - 166/68)  ABP(mean): 86 (15 Jul 2022 07:00) (55 - 108)  RR: 26 (15 Jul 2022 07:00) (0 - 29)  SpO2: 95% (15 Jul 2022 07:00) (90% - 100%)    O2 Parameters below as of 15 Jul 2022 07:00  Patient On (Oxygen Delivery Method): ventilator    O2 Concentration (%): 30        Mode: AC/ CMV (Assist Control/ Continuous Mandatory Ventilation), RR (machine): 24, TV (machine): 500, FiO2: 100, PEEP: 15, ITime: 1, MAP: 21, PIP: 36  Plateau pressure:   P/F ratio:     I&O's Summary    14 Jul 2022 07:01  -  15 Jul 2022 07:00  --------------------------------------------------------  IN: 1565.8 mL / OUT: 1975 mL / NET: -409.2 mL        CAPILLARY BLOOD GLUCOSE      POCT Blood Glucose.: 113 mg/dL (11 Jul 2022 12:08)      ECG:    PHYSICAL EXAM:    GENERAL: intubated, sedated and paralyzed   HEAD:  Atraumatic. Normocephalic.  EYES: pupils pinpoint but equal. Normal conjunctiva/sclera.  CARDIAC: +distance heart sounds. Regular rhythm w/o m, r,g   LUNG/CHEST: distant but clear breath sounds bilat. No wheezes. No rales. No rhonchi.  POCUS showed scattered B lines bilat with bibasilar consolidations w/o effusions   ABDOMEN: Soft. No tenderness. No distension. No fluid wave. Normal bowel sounds.  BACK: battery pack and bloody tape in thoracic midline of spine. Site is not erythematous or indurated and without drainage.   VASCULAR: +2 b/l radial or ulnar pulses. Palpable DP pulses.  EXTREMITIES:  No clubbing. No cyanosis. +2 b/l LE nonpitting edema unchanged.   NEUROLOGY: unable to examine as pt is paralyzed.       MEDICATIONS:  MEDICATIONS  (STANDING):  azithromycin  IVPB 500 milliGRAM(s) IV Intermittent every 24 hours  chlorhexidine 0.12% Liquid 15 milliLiter(s) Oral Mucosa every 12 hours  chlorhexidine 4% Liquid 1 Application(s) Topical <User Schedule>  dicyclomine 10 milliGRAM(s) Oral before breakfast  enoxaparin Injectable 40 milliGRAM(s) SubCutaneous every 24 hours  fentaNYL   Infusion... 2 MICROgram(s)/kG/Hr (16.9 mL/Hr) IV Continuous <Continuous>  glucagon  Injectable 1 milliGRAM(s) IntraMuscular once  insulin lispro (ADMELOG) corrective regimen sliding scale   SubCutaneous every 6 hours  norepinephrine Infusion 0.08 MICROgram(s)/kG/Min (25.4 mL/Hr) IV Continuous <Continuous>  piperacillin/tazobactam IVPB.. 3.375 Gram(s) IV Intermittent every 8 hours  propofol Infusion 10 MICROgram(s)/kG/Min (10.2 mL/Hr) IV Continuous <Continuous>  rocuronium Injectable 30 milliGRAM(s) IV Push once  vancomycin  IVPB 1000 milliGRAM(s) IV Intermittent every 12 hours    MEDICATIONS  (PRN):  acetaminophen    Suspension .. 650 milliGRAM(s) Oral every 6 hours PRN Temp greater or equal to 38C (100.4F), Moderate Pain (4 - 6)  fentaNYL    Injectable 50 MICROGram(s) IV Push every 15 minutes PRN uptitration      ALLERGIES:  Allergies    amitriptyline (Other)    Intolerances        LABS:    CBC Full  -  ( 15 Jul 2022 00:31 )  WBC Count : 10.85 K/uL  RBC Count : 3.16 M/uL  Hemoglobin : 9.0 g/dL  Hematocrit : 29.0 %  Platelet Count - Automated : 487 K/uL  Mean Cell Volume : 91.8 fl  Mean Cell Hemoglobin : 28.5 pg  Mean Cell Hemoglobin Concentration : 31.0 gm/dL  Auto Neutrophil # : x  Auto Lymphocyte # : x  Auto Monocyte # : x  Auto Eosinophil # : x  Auto Basophil # : x  Auto Neutrophil % : x  Auto Lymphocyte % : x  Auto Monocyte % : x  Auto Eosinophil % : x  Auto Basophil % : x    07-15    135  |  103  |  19  ----------------------------<  269<H>  4.2   |  18<L>  |  0.97    Ca    8.7      15 Jul 2022 00:29  Phos  3.6     07-15  Mg     2.2     07-15    TPro  7.6  /  Alb  3.0<L>  /  TBili  0.5  /  DBili  x   /  AST  46<H>  /  ALT  56<H>  /  AlkPhos  246<H>  07-15    Blood Gas Profile - Arterial (07.15.22 @ 00:19)    pH, Arterial: 7.31: No collection time indicated, please interpret with caution    pCO2, Arterial: 39 mmHg    pO2, Arterial: 92 mmHg    HCO3, Arterial: 20 mmol/L    Base Excess, Arterial: -6.2 mmol/L    Oxygen Saturation, Arterial: 96.8 %    Total CO2, Arterial: 21 mmol/L    FIO2, Arterial: 30.0    Blood Gas Source Arterial: Arterial      Micro:    Culture - Blood (collected 07-10-22 @ 14:35)  Source: .Blood Blood-Peripheral  Preliminary Report (07-11-22 @ 23:02):    No growth to date.    Culture - Blood (collected 07-10-22 @ 14:20)  Source: .Blood Blood-Peripheral  Preliminary Report (07-11-22 @ 23:02):    No growth to date.          RADIOLOGY & ADDITIONAL TESTS: Reviewed.   INTERVAL HPI/OVERNIGHT EVENTS:  -IV lasix given overnight. Yesterday, Epidural spinal cord stimulator trial leads were extracted at bedside.   SUBJECTIVE: Patient seen and examined at bedside. Pt opens her eyes to voice. Pt tolerating trial of CPAP.     VITAL SIGNS:  ICU Vital Signs Last 24 Hrs  T(C): 36.7 (15 Jul 2022 07:00), Max: 38.2 (14 Jul 2022 12:00)  T(F): 98.1 (15 Jul 2022 07:00), Max: 100.8 (14 Jul 2022 12:00)  HR: 73 (15 Jul 2022 07:00) (44 - 91)  BP: --  BP(mean): --  ABP: 145/60 (15 Jul 2022 07:00) (85/41 - 166/68)  ABP(mean): 86 (15 Jul 2022 07:00) (55 - 108)  RR: 26 (15 Jul 2022 07:00) (0 - 29)  SpO2: 95% (15 Jul 2022 07:00) (90% - 100%)    O2 Parameters below as of 15 Jul 2022 07:00  Patient On (Oxygen Delivery Method): ventilator    O2 Concentration (%): 30        Mode: AC/ CMV (Assist Control/ Continuous Mandatory Ventilation), RR (machine): 24, TV (machine): 500, FiO2: 100, PEEP: 15, ITime: 1, MAP: 21, PIP: 36  Plateau pressure:   P/F ratio:     I&O's Summary    14 Jul 2022 07:01  -  15 Jul 2022 07:00  --------------------------------------------------------  IN: 1565.8 mL / OUT: 1975 mL / NET: -409.2 mL        CAPILLARY BLOOD GLUCOSE      POCT Blood Glucose.: 113 mg/dL (11 Jul 2022 12:08)      ECG:    PHYSICAL EXAM:    GENERAL: intubated, sedated and paralyzed   HEAD:  Atraumatic. Normocephalic.  EYES: pupils pinpoint but equal. Normal conjunctiva/sclera.  CARDIAC: +distance heart sounds. Regular rhythm w/o m, r,g   LUNG/CHEST: distant but clear breath sounds bilat. No wheezes. No rales. No rhonchi.  POCUS showed scattered B lines bilat with bibasilar consolidations w/o effusions   ABDOMEN: Soft. No tenderness. No distension. No fluid wave. Normal bowel sounds.  BACK: battery pack and bloody tape in thoracic midline of spine. Site is not erythematous or indurated and without drainage.   VASCULAR: +2 b/l radial or ulnar pulses. Palpable DP pulses.  EXTREMITIES:  No clubbing. No cyanosis. +2 b/l LE nonpitting edema unchanged.   NEUROLOGY: unable to examine as pt is paralyzed.       MEDICATIONS:  MEDICATIONS  (STANDING):  azithromycin  IVPB 500 milliGRAM(s) IV Intermittent every 24 hours  chlorhexidine 0.12% Liquid 15 milliLiter(s) Oral Mucosa every 12 hours  chlorhexidine 4% Liquid 1 Application(s) Topical <User Schedule>  dicyclomine 10 milliGRAM(s) Oral before breakfast  enoxaparin Injectable 40 milliGRAM(s) SubCutaneous every 24 hours  fentaNYL   Infusion... 2 MICROgram(s)/kG/Hr (16.9 mL/Hr) IV Continuous <Continuous>  glucagon  Injectable 1 milliGRAM(s) IntraMuscular once  insulin lispro (ADMELOG) corrective regimen sliding scale   SubCutaneous every 6 hours  norepinephrine Infusion 0.08 MICROgram(s)/kG/Min (25.4 mL/Hr) IV Continuous <Continuous>  piperacillin/tazobactam IVPB.. 3.375 Gram(s) IV Intermittent every 8 hours  propofol Infusion 10 MICROgram(s)/kG/Min (10.2 mL/Hr) IV Continuous <Continuous>  rocuronium Injectable 30 milliGRAM(s) IV Push once  vancomycin  IVPB 1000 milliGRAM(s) IV Intermittent every 12 hours    MEDICATIONS  (PRN):  acetaminophen    Suspension .. 650 milliGRAM(s) Oral every 6 hours PRN Temp greater or equal to 38C (100.4F), Moderate Pain (4 - 6)  fentaNYL    Injectable 50 MICROGram(s) IV Push every 15 minutes PRN uptitration      ALLERGIES:  Allergies    amitriptyline (Other)    Intolerances        LABS:    CBC Full  -  ( 15 Jul 2022 00:31 )  WBC Count : 10.85 K/uL  RBC Count : 3.16 M/uL  Hemoglobin : 9.0 g/dL  Hematocrit : 29.0 %  Platelet Count - Automated : 487 K/uL  Mean Cell Volume : 91.8 fl  Mean Cell Hemoglobin : 28.5 pg  Mean Cell Hemoglobin Concentration : 31.0 gm/dL  Auto Neutrophil # : x  Auto Lymphocyte # : x  Auto Monocyte # : x  Auto Eosinophil # : x  Auto Basophil # : x  Auto Neutrophil % : x  Auto Lymphocyte % : x  Auto Monocyte % : x  Auto Eosinophil % : x  Auto Basophil % : x    07-15    135  |  103  |  19  ----------------------------<  269<H>  4.2   |  18<L>  |  0.97    Ca    8.7      15 Jul 2022 00:29  Phos  3.6     07-15  Mg     2.2     07-15    TPro  7.6  /  Alb  3.0<L>  /  TBili  0.5  /  DBili  x   /  AST  46<H>  /  ALT  56<H>  /  AlkPhos  246<H>  07-15    Blood Gas Profile - Arterial (07.15.22 @ 00:19)    pH, Arterial: 7.31: No collection time indicated, please interpret with caution    pCO2, Arterial: 39 mmHg    pO2, Arterial: 92 mmHg    HCO3, Arterial: 20 mmol/L    Base Excess, Arterial: -6.2 mmol/L    Oxygen Saturation, Arterial: 96.8 %    Total CO2, Arterial: 21 mmol/L    FIO2, Arterial: 30.0    Blood Gas Source Arterial: Arterial      Micro:    Culture - Blood (collected 07-10-22 @ 14:35)  Source: .Blood Blood-Peripheral  Preliminary Report (07-11-22 @ 23:02):    No growth to date.    Culture - Blood (collected 07-10-22 @ 14:20)  Source: .Blood Blood-Peripheral  Preliminary Report (07-11-22 @ 23:02):    No growth to date.          RADIOLOGY & ADDITIONAL TESTS: Reviewed.

## 2022-07-15 NOTE — CHART NOTE - NSCHARTNOTEFT_GEN_A_CORE
MICU DOWN GRADE NOTE      Patient is a 52y old  Female who presents with a chief complaint of sob, fever, cough (15 Jul 2022 07:28)      HPI: 52F c hx CRPS, MO, DM2, HTN, HTN, IBS, chronic back pain, pseudotumor cerebri, recent spinal nerve stimulator placed (7/8/22 @ [facility]), pw 2 days fevers, SOB, productive cough.    Pt reports having the neurostimulator placed at [facility] as an outpt procedure under conscious sedation, was observed, discharged home. That night at home, pt started to fever, SOB, productive cough. Pt states her symptoms have progressively gotten worse since then. Pt states she is having orthopnea and also can't lay flat 2/2 recent spinal stimulator. Reports b/l pleuritic rib pain. Denies urinary symptoms, diarrhea, abd pain. Pt ambulates with walker at baseline.    INTERVAL HPI/OVERNIGHT EVENTS:        REVIEW OF SYSTEMS:  CONSTITUTIONAL: No fever, chills  HEENT:  No blurry vision No sinus or throat pain  NECK: No pain or stiffness  RESPIRATORY: No cough, wheezing, chills or hemoptysis; No shortness of breath  CARDIOVASCULAR: No chest pain, palpitations  GASTROINTESTINAL: No abdominal pain. No nausea, vomiting, or diarrhea  GENITOURINARY: No dysuria  NEUROLOGICAL: No HA, No focal weakness  SKIN: No itching, burning, rashes, or lesions   MUSCULOSKELETAL: No joint pain or swelling; No muscle, back, or extremity pain    MEDICATIONS:  acetaminophen    Suspension .. 650 milliGRAM(s) Oral every 6 hours PRN  chlorhexidine 0.12% Liquid 15 milliLiter(s) Oral Mucosa every 12 hours  chlorhexidine 4% Liquid 1 Application(s) Topical <User Schedule>  dexMEDEtomidine Infusion 0.2 MICROgram(s)/kG/Hr IV Continuous <Continuous>  glucagon  Injectable 1 milliGRAM(s) IntraMuscular once  heparin   Injectable 7500 Unit(s) SubCutaneous every 8 hours  insulin lispro (ADMELOG) corrective regimen sliding scale   SubCutaneous every 6 hours  insulin NPH human recombinant 4 Unit(s) SubCutaneous every 6 hours  naloxegol 25 milliGRAM(s) Oral daily  pantoprazole  Injectable 40 milliGRAM(s) IV Push daily  piperacillin/tazobactam IVPB.. 3.375 Gram(s) IV Intermittent every 8 hours  polyethylene glycol 3350 17 Gram(s) Oral every 12 hours  senna Syrup 5 milliLiter(s) Oral at bedtime  sertraline 50 milliGRAM(s) Oral daily      T(C): 37.1 (07-15-22 @ 12:00), Max: 38.2 (07-14-22 @ 15:00)  HR: 67 (07-15-22 @ 13:00) (44 - 78)  BP: 165/105 (07-15-22 @ 13:00) (112/58 - 165/105)  RR: 20 (07-15-22 @ 13:00) (0 - 29)  SpO2: 99% (07-15-22 @ 13:00) (90% - 100%)  Wt(kg): --Vital Signs Last 24 Hrs  T(C): 37.1 (15 Jul 2022 12:00), Max: 38.2 (14 Jul 2022 15:00)  T(F): 98.8 (15 Jul 2022 12:00), Max: 100.8 (14 Jul 2022 15:00)  HR: 67 (15 Jul 2022 13:00) (44 - 78)  BP: 165/105 (15 Jul 2022 13:00) (112/58 - 165/105)  BP(mean): 127 (15 Jul 2022 13:00) (79 - 127)  RR: 20 (15 Jul 2022 13:00) (0 - 29)  SpO2: 99% (15 Jul 2022 13:00) (90% - 100%)    Parameters below as of 15 Jul 2022 07:00  Patient On (Oxygen Delivery Method): ventilator    O2 Concentration (%): 30    PHYSICAL EXAM:  GENERAL: NAD, well-groomed, well-developed  HEAD:  Atraumatic, Normocephalic  EYES: EOMI, PERRLA, conjunctiva and sclera clear  ENMT:  Moist mucous membranes  NECK: Supple, No JVD,  CHEST/LUNG: Clear to auscultation bilaterally; No rales, rhonchi, wheezing, or rubs  HEART: Regular rate and rhythm; No murmurs, rubs, or gallops  ABDOMEN: Soft, Nontender, Nondistended; Bowel sounds present  NEURO: Alert & Oriented X3  EXTREMITIES: No LE edema, no calf tenderness  LYMPH: No lymphadenopathy noted  SKIN: No rashes or lesions    Consultant(s) Notes Reviewed:  [x ] YES  [ ] NO  Care Discussed with Consultants/Other Providers [ x] YES  [ ] NO    LABS:                        9.0    10.85 )-----------( 487      ( 15 Jul 2022 00:31 )             29.0     07-15    135  |  103  |  19  ----------------------------<  269<H>  4.2   |  18<L>  |  0.97    Ca    8.7      15 Jul 2022 00:29  Phos  3.6     07-15  Mg     2.2     07-15    TPro  7.6  /  Alb  3.0<L>  /  TBili  0.5  /  DBili  x   /  AST  46<H>  /  ALT  56<H>  /  AlkPhos  246<H>  07-15    PT/INR - ( 15 Jul 2022 00:31 )   PT: 12.7 sec;   INR: 1.10 ratio         PTT - ( 15 Jul 2022 00:31 )  PTT:29.5 sec    CAPILLARY BLOOD GLUCOSE      POCT Blood Glucose.: 288 mg/dL (15 Jul 2022 11:02)  POCT Blood Glucose.: 236 mg/dL (15 Jul 2022 06:06)  POCT Blood Glucose.: 289 mg/dL (15 Jul 2022 00:13)  POCT Blood Glucose.: 291 mg/dL (14 Jul 2022 17:11)      ABG - ( 15 Jul 2022 13:20 )  pH, Arterial: 7.36  pH, Blood: x     /  pCO2: 41    /  pO2: 69    / HCO3: 23    / Base Excess: -2.1  /  SaO2: 94.8                  RADIOLOGY & ADDITIONAL TESTS:    Imaging Personally Reviewed:  [x ] YES  [ ] NO MICU DOWN GRADE NOTE      Patient is a 52y old  Female who presents with a chief complaint of sob, fever, cough (15 Jul 2022 07:28)      HPI: 52F c hx CRPS, MO, DM2, HTN, HTN, IBS, chronic back pain, pseudotumor cerebri, recent spinal nerve stimulator placed (7/8/22 @ [facility]), pw 2 days fevers, SOB, productive cough.    Pt reports having the neurostimulator placed at [facility] as an outpt procedure under conscious sedation, was observed, discharged home. That night at home, pt started to fever, SOB, productive cough. Pt states her symptoms have progressively gotten worse since then. Pt states she is having orthopnea and also can't lay flat 2/2 recent spinal stimulator. Reports b/l pleuritic rib pain. Denies urinary symptoms, diarrhea, abd pain. Pt ambulates with walker at baseline.    INTERVAL HPI/OVERNIGHT EVENTS: Since admission, pt SOB worsened on NRB requiring bipap. Pt fever also spiked to 104.1. MICU initially consulted for multifocal pna on Bipap, but at the time pt was not tachypnic. However, this AM pt became increasingly more tachypneic and reports feeling more tired on bipap 12/8/70% satting 89-90%. MICU was reconsulted for increased work of breathing on bipap.      Pt was intubated on 7/11, and was treated in the MICU with zosyn for aspiration PNA. Pt became febrile a few times, however bronchoalveolar lavage and blood cultures showed no growth. Pt was weaned off pressors and sedation and successfully trialled on CPAP, with extubation on _____. Additionally, her spinal cord stimulator was removed by neurosurgery team on 7/14.     Pt is hemodynamically stable and ready for transfer to floors.       REVIEW OF SYSTEMS:  CONSTITUTIONAL: No fever, chills  HEENT:  No blurry vision No sinus or throat pain  NECK: No pain or stiffness  RESPIRATORY: No cough, wheezing, chills or hemoptysis; No shortness of breath  CARDIOVASCULAR: No chest pain, palpitations  GASTROINTESTINAL: No abdominal pain. No nausea, vomiting, or diarrhea  GENITOURINARY: No dysuria  NEUROLOGICAL: No HA, No focal weakness  SKIN: No itching, burning, rashes, or lesions   MUSCULOSKELETAL: No joint pain or swelling; No muscle, back, or extremity pain    MEDICATIONS:  acetaminophen    Suspension .. 650 milliGRAM(s) Oral every 6 hours PRN  chlorhexidine 0.12% Liquid 15 milliLiter(s) Oral Mucosa every 12 hours  chlorhexidine 4% Liquid 1 Application(s) Topical <User Schedule>  dexMEDEtomidine Infusion 0.2 MICROgram(s)/kG/Hr IV Continuous <Continuous>  glucagon  Injectable 1 milliGRAM(s) IntraMuscular once  heparin   Injectable 7500 Unit(s) SubCutaneous every 8 hours  insulin lispro (ADMELOG) corrective regimen sliding scale   SubCutaneous every 6 hours  insulin NPH human recombinant 4 Unit(s) SubCutaneous every 6 hours  naloxegol 25 milliGRAM(s) Oral daily  pantoprazole  Injectable 40 milliGRAM(s) IV Push daily  piperacillin/tazobactam IVPB.. 3.375 Gram(s) IV Intermittent every 8 hours  polyethylene glycol 3350 17 Gram(s) Oral every 12 hours  senna Syrup 5 milliLiter(s) Oral at bedtime  sertraline 50 milliGRAM(s) Oral daily      T(C): 37.1 (07-15-22 @ 12:00), Max: 38.2 (07-14-22 @ 15:00)  HR: 67 (07-15-22 @ 13:00) (44 - 78)  BP: 165/105 (07-15-22 @ 13:00) (112/58 - 165/105)  RR: 20 (07-15-22 @ 13:00) (0 - 29)  SpO2: 99% (07-15-22 @ 13:00) (90% - 100%)  Wt(kg): --Vital Signs Last 24 Hrs  T(C): 37.1 (15 Jul 2022 12:00), Max: 38.2 (14 Jul 2022 15:00)  T(F): 98.8 (15 Jul 2022 12:00), Max: 100.8 (14 Jul 2022 15:00)  HR: 67 (15 Jul 2022 13:00) (44 - 78)  BP: 165/105 (15 Jul 2022 13:00) (112/58 - 165/105)  BP(mean): 127 (15 Jul 2022 13:00) (79 - 127)  RR: 20 (15 Jul 2022 13:00) (0 - 29)  SpO2: 99% (15 Jul 2022 13:00) (90% - 100%)    Parameters below as of 15 Jul 2022 07:00  Patient On (Oxygen Delivery Method): ventilator    O2 Concentration (%): 30    PHYSICAL EXAM:  GENERAL: NAD, well-groomed, well-developed  HEAD:  Atraumatic, Normocephalic  EYES: EOMI, PERRLA, conjunctiva and sclera clear  ENMT:  Moist mucous membranes  NECK: Supple, No JVD,  CHEST/LUNG: Clear to auscultation bilaterally; No rales, rhonchi, wheezing, or rubs  HEART: Regular rate and rhythm; No murmurs, rubs, or gallops  ABDOMEN: Soft, Nontender, Nondistended; Bowel sounds present  NEURO: Alert & Oriented X3  EXTREMITIES: No LE edema, no calf tenderness  LYMPH: No lymphadenopathy noted  SKIN: No rashes or lesions    Consultant(s) Notes Reviewed:  [x ] YES  [ ] NO  Care Discussed with Consultants/Other Providers [ x] YES  [ ] NO    LABS:                        9.0    10.85 )-----------( 487      ( 15 Jul 2022 00:31 )             29.0     07-15    135  |  103  |  19  ----------------------------<  269<H>  4.2   |  18<L>  |  0.97    Ca    8.7      15 Jul 2022 00:29  Phos  3.6     07-15  Mg     2.2     07-15    TPro  7.6  /  Alb  3.0<L>  /  TBili  0.5  /  DBili  x   /  AST  46<H>  /  ALT  56<H>  /  AlkPhos  246<H>  07-15    PT/INR - ( 15 Jul 2022 00:31 )   PT: 12.7 sec;   INR: 1.10 ratio         PTT - ( 15 Jul 2022 00:31 )  PTT:29.5 sec    CAPILLARY BLOOD GLUCOSE      POCT Blood Glucose.: 288 mg/dL (15 Jul 2022 11:02)  POCT Blood Glucose.: 236 mg/dL (15 Jul 2022 06:06)  POCT Blood Glucose.: 289 mg/dL (15 Jul 2022 00:13)  POCT Blood Glucose.: 291 mg/dL (14 Jul 2022 17:11)      ABG - ( 15 Jul 2022 13:20 )  pH, Arterial: 7.36  pH, Blood: x     /  pCO2: 41    /  pO2: 69    / HCO3: 23    / Base Excess: -2.1  /  SaO2: 94.8        RADIOLOGY & ADDITIONAL TESTS:    Imaging Personally Reviewed:  [x ] YES  [ ] NO    To Dos:    -f/u on fingersticks and adjust ISS as needed  -c/w home meds

## 2022-07-15 NOTE — PROGRESS NOTE ADULT - NUTRITIONAL ASSESSMENT
This patient has been assessed with a concern for Malnutrition and has been determined to have a diagnosis/diagnoses of Morbid obesity (BMI > 40).    This patient is being managed with:   Diet NPO with Tube Feed-  Tube Feeding Modality: Orogastric  Glucerna 1.2 Maldonado (GLUCERNARTH)  Total Volume for 24 Hours (mL): 360  Intermittent  Starting Tube Feed Rate {mL per Hour}: 10  Increase Tube Feed Rate by (mL): 10    Every hour  Until Goal Tube Feed Rate (mL per Hour): 20  Tube Feeding Hours ON: 18  Tube Feeding OFF (Hours): 6  Tube Feed Start Time: 09:00  Free Water Flush   Total Volume per Flush (mL): 200   Frequency: Every 6 Hours  Entered: Jul 14 2022  8:59PM

## 2022-07-16 LAB
ALBUMIN SERPL ELPH-MCNC: 2.9 G/DL — LOW (ref 3.3–5)
ALP SERPL-CCNC: 217 U/L — HIGH (ref 40–120)
ALT FLD-CCNC: 54 U/L — HIGH (ref 10–45)
ANION GAP SERPL CALC-SCNC: 10 MMOL/L — SIGNIFICANT CHANGE UP (ref 5–17)
APTT BLD: 26.4 SEC — LOW (ref 27.5–35.5)
AST SERPL-CCNC: 46 U/L — HIGH (ref 10–40)
BILIRUB SERPL-MCNC: 0.3 MG/DL — SIGNIFICANT CHANGE UP (ref 0.2–1.2)
BUN SERPL-MCNC: 18 MG/DL — SIGNIFICANT CHANGE UP (ref 7–23)
CALCIUM SERPL-MCNC: 8.3 MG/DL — LOW (ref 8.4–10.5)
CHLORIDE SERPL-SCNC: 104 MMOL/L — SIGNIFICANT CHANGE UP (ref 96–108)
CO2 SERPL-SCNC: 21 MMOL/L — LOW (ref 22–31)
CREAT SERPL-MCNC: 0.73 MG/DL — SIGNIFICANT CHANGE UP (ref 0.5–1.3)
CULTURE RESULTS: SIGNIFICANT CHANGE UP
EGFR: 99 ML/MIN/1.73M2 — SIGNIFICANT CHANGE UP
GAS PNL BLDA: SIGNIFICANT CHANGE UP
GLUCOSE BLDC GLUCOMTR-MCNC: 256 MG/DL — HIGH (ref 70–99)
GLUCOSE BLDC GLUCOMTR-MCNC: 271 MG/DL — HIGH (ref 70–99)
GLUCOSE BLDC GLUCOMTR-MCNC: 278 MG/DL — HIGH (ref 70–99)
GLUCOSE BLDC GLUCOMTR-MCNC: 327 MG/DL — HIGH (ref 70–99)
GLUCOSE SERPL-MCNC: 336 MG/DL — HIGH (ref 70–99)
HCT VFR BLD CALC: 26.5 % — LOW (ref 34.5–45)
HGB BLD-MCNC: 8.2 G/DL — LOW (ref 11.5–15.5)
INR BLD: 1.11 RATIO — SIGNIFICANT CHANGE UP (ref 0.88–1.16)
MAGNESIUM SERPL-MCNC: 2.3 MG/DL — SIGNIFICANT CHANGE UP (ref 1.6–2.6)
MCHC RBC-ENTMCNC: 28.6 PG — SIGNIFICANT CHANGE UP (ref 27–34)
MCHC RBC-ENTMCNC: 30.9 GM/DL — LOW (ref 32–36)
MCV RBC AUTO: 92.3 FL — SIGNIFICANT CHANGE UP (ref 80–100)
NRBC # BLD: 0 /100 WBCS — SIGNIFICANT CHANGE UP (ref 0–0)
PHOSPHATE SERPL-MCNC: 2.5 MG/DL — SIGNIFICANT CHANGE UP (ref 2.5–4.5)
PLATELET # BLD AUTO: 530 K/UL — HIGH (ref 150–400)
POTASSIUM SERPL-MCNC: 4.7 MMOL/L — SIGNIFICANT CHANGE UP (ref 3.5–5.3)
POTASSIUM SERPL-SCNC: 4.7 MMOL/L — SIGNIFICANT CHANGE UP (ref 3.5–5.3)
PROT SERPL-MCNC: 7.4 G/DL — SIGNIFICANT CHANGE UP (ref 6–8.3)
PROTHROM AB SERPL-ACNC: 12.9 SEC — SIGNIFICANT CHANGE UP (ref 10.5–13.4)
RBC # BLD: 2.87 M/UL — LOW (ref 3.8–5.2)
RBC # FLD: 13.8 % — SIGNIFICANT CHANGE UP (ref 10.3–14.5)
SODIUM SERPL-SCNC: 135 MMOL/L — SIGNIFICANT CHANGE UP (ref 135–145)
SPECIMEN SOURCE: SIGNIFICANT CHANGE UP
WBC # BLD: 13.34 K/UL — HIGH (ref 3.8–10.5)
WBC # FLD AUTO: 13.34 K/UL — HIGH (ref 3.8–10.5)

## 2022-07-16 PROCEDURE — 99291 CRITICAL CARE FIRST HOUR: CPT

## 2022-07-16 RX ORDER — HYDRALAZINE HCL 50 MG
10 TABLET ORAL ONCE
Refills: 0 | Status: COMPLETED | OUTPATIENT
Start: 2022-07-16 | End: 2022-07-16

## 2022-07-16 RX ORDER — FUROSEMIDE 40 MG
40 TABLET ORAL ONCE
Refills: 0 | Status: COMPLETED | OUTPATIENT
Start: 2022-07-16 | End: 2022-07-16

## 2022-07-16 RX ORDER — HUMAN INSULIN 100 [IU]/ML
20 INJECTION, SUSPENSION SUBCUTANEOUS EVERY 6 HOURS
Refills: 0 | Status: DISCONTINUED | OUTPATIENT
Start: 2022-07-16 | End: 2022-07-17

## 2022-07-16 RX ORDER — LISINOPRIL 2.5 MG/1
10 TABLET ORAL DAILY
Refills: 0 | Status: DISCONTINUED | OUTPATIENT
Start: 2022-07-16 | End: 2022-07-18

## 2022-07-16 RX ORDER — HYDROMORPHONE HYDROCHLORIDE 2 MG/ML
0.75 INJECTION INTRAMUSCULAR; INTRAVENOUS; SUBCUTANEOUS EVERY 6 HOURS
Refills: 0 | Status: DISCONTINUED | OUTPATIENT
Start: 2022-07-16 | End: 2022-07-17

## 2022-07-16 RX ORDER — HUMAN INSULIN 100 [IU]/ML
15 INJECTION, SUSPENSION SUBCUTANEOUS EVERY 6 HOURS
Refills: 0 | Status: DISCONTINUED | OUTPATIENT
Start: 2022-07-16 | End: 2022-07-16

## 2022-07-16 RX ORDER — HUMAN INSULIN 100 [IU]/ML
18 INJECTION, SUSPENSION SUBCUTANEOUS EVERY 6 HOURS
Refills: 0 | Status: DISCONTINUED | OUTPATIENT
Start: 2022-07-16 | End: 2022-07-16

## 2022-07-16 RX ORDER — HYDRALAZINE HCL 50 MG
10 TABLET ORAL THREE TIMES A DAY
Refills: 0 | Status: DISCONTINUED | OUTPATIENT
Start: 2022-07-16 | End: 2022-07-17

## 2022-07-16 RX ADMIN — Medication 3 MILLILITER(S): at 11:04

## 2022-07-16 RX ADMIN — HEPARIN SODIUM 7500 UNIT(S): 5000 INJECTION INTRAVENOUS; SUBCUTANEOUS at 14:03

## 2022-07-16 RX ADMIN — Medication 6: at 06:19

## 2022-07-16 RX ADMIN — POLYETHYLENE GLYCOL 3350 17 GRAM(S): 17 POWDER, FOR SOLUTION ORAL at 23:29

## 2022-07-16 RX ADMIN — HUMAN INSULIN 15 UNIT(S): 100 INJECTION, SUSPENSION SUBCUTANEOUS at 06:20

## 2022-07-16 RX ADMIN — DEXMEDETOMIDINE HYDROCHLORIDE IN 0.9% SODIUM CHLORIDE 8.46 MICROGRAM(S)/KG/HR: 4 INJECTION INTRAVENOUS at 06:16

## 2022-07-16 RX ADMIN — HEPARIN SODIUM 7500 UNIT(S): 5000 INJECTION INTRAVENOUS; SUBCUTANEOUS at 06:16

## 2022-07-16 RX ADMIN — HYDROMORPHONE HYDROCHLORIDE 0.75 MILLIGRAM(S): 2 INJECTION INTRAMUSCULAR; INTRAVENOUS; SUBCUTANEOUS at 23:29

## 2022-07-16 RX ADMIN — Medication 10 MILLIGRAM(S): at 17:06

## 2022-07-16 RX ADMIN — PIPERACILLIN AND TAZOBACTAM 25 GRAM(S): 4; .5 INJECTION, POWDER, LYOPHILIZED, FOR SOLUTION INTRAVENOUS at 09:00

## 2022-07-16 RX ADMIN — PIPERACILLIN AND TAZOBACTAM 25 GRAM(S): 4; .5 INJECTION, POWDER, LYOPHILIZED, FOR SOLUTION INTRAVENOUS at 23:29

## 2022-07-16 RX ADMIN — POLYETHYLENE GLYCOL 3350 17 GRAM(S): 17 POWDER, FOR SOLUTION ORAL at 11:38

## 2022-07-16 RX ADMIN — Medication 3 MILLILITER(S): at 22:35

## 2022-07-16 RX ADMIN — Medication 40 MILLIGRAM(S): at 18:15

## 2022-07-16 RX ADMIN — HUMAN INSULIN 4 UNIT(S): 100 INJECTION, SUSPENSION SUBCUTANEOUS at 00:18

## 2022-07-16 RX ADMIN — HUMAN INSULIN 18 UNIT(S): 100 INJECTION, SUSPENSION SUBCUTANEOUS at 17:28

## 2022-07-16 RX ADMIN — Medication 6: at 17:29

## 2022-07-16 RX ADMIN — SENNA PLUS 5 MILLILITER(S): 8.6 TABLET ORAL at 22:26

## 2022-07-16 RX ADMIN — Medication 6: at 11:37

## 2022-07-16 RX ADMIN — HUMAN INSULIN 15 UNIT(S): 100 INJECTION, SUSPENSION SUBCUTANEOUS at 11:37

## 2022-07-16 RX ADMIN — PIPERACILLIN AND TAZOBACTAM 25 GRAM(S): 4; .5 INJECTION, POWDER, LYOPHILIZED, FOR SOLUTION INTRAVENOUS at 17:00

## 2022-07-16 RX ADMIN — Medication 3 MILLILITER(S): at 05:25

## 2022-07-16 RX ADMIN — POLYETHYLENE GLYCOL 3350 17 GRAM(S): 17 POWDER, FOR SOLUTION ORAL at 00:18

## 2022-07-16 RX ADMIN — Medication 3 MILLILITER(S): at 17:11

## 2022-07-16 RX ADMIN — NALOXEGOL OXALATE 25 MILLIGRAM(S): 12.5 TABLET, FILM COATED ORAL at 11:38

## 2022-07-16 RX ADMIN — CHLORHEXIDINE GLUCONATE 15 MILLILITER(S): 213 SOLUTION TOPICAL at 06:16

## 2022-07-16 RX ADMIN — PANTOPRAZOLE SODIUM 40 MILLIGRAM(S): 20 TABLET, DELAYED RELEASE ORAL at 11:39

## 2022-07-16 RX ADMIN — HYDROMORPHONE HYDROCHLORIDE 0.75 MILLIGRAM(S): 2 INJECTION INTRAMUSCULAR; INTRAVENOUS; SUBCUTANEOUS at 17:06

## 2022-07-16 RX ADMIN — HEPARIN SODIUM 7500 UNIT(S): 5000 INJECTION INTRAVENOUS; SUBCUTANEOUS at 22:27

## 2022-07-16 RX ADMIN — Medication 3 MILLILITER(S): at 00:01

## 2022-07-16 RX ADMIN — Medication 8: at 00:18

## 2022-07-16 RX ADMIN — CHLORHEXIDINE GLUCONATE 1 APPLICATION(S): 213 SOLUTION TOPICAL at 06:17

## 2022-07-16 RX ADMIN — SERTRALINE 50 MILLIGRAM(S): 25 TABLET, FILM COATED ORAL at 11:38

## 2022-07-16 RX ADMIN — CHLORHEXIDINE GLUCONATE 15 MILLILITER(S): 213 SOLUTION TOPICAL at 17:19

## 2022-07-16 RX ADMIN — PIPERACILLIN AND TAZOBACTAM 25 GRAM(S): 4; .5 INJECTION, POWDER, LYOPHILIZED, FOR SOLUTION INTRAVENOUS at 00:18

## 2022-07-16 NOTE — PROGRESS NOTE ADULT - ASSESSMENT
Fauzia Linares is a 52 year old with  CRPS, MO, DM2, HTN, HTN, IBS, chronic back pain, pseudotumor cerebri, recent spinal nerve stimulator placed (7/8/22 @ [facility]) who initially presented with hypoxic respiratory failure, found to have multifocal pna 2/2 aspiration pneumonia now with increased work of breathing on bipap admitted to MICU. Now s/p intubated for hypoxic resp failure on bipap and with severe ARDS likely 2/2 Asp PNA    #Neuro  Sedated on Precedex   //s/p spinal stimulator w/ chronic back pain, CRPS  - pt afebrile overnight  - cont home sertraline   - spinal cord stimulator extracted on 7/14    #Cardiovascular  //Sepsis, vasoplegia 2/2 sedation  - restarted precedex for agitation, monitor for bradycardia   - will cont to hold home nifedipineXL, and hold lisinopril, lasix     #Respiratory  //Severe ARDS likely 2/2 bilat asp pna   - CT demonstrating significant bilat pna   - /26/8/30%, pt with decreasing vent requirements  - tolerating CPAP trial today, potential extubation today   - ABG with pH of 7.39, pCO2 of 39, pO2 of 65, HCO3 24  - bronchoscopy performed. BAL with NGTD    #GI/Nutrition  //hx IBS   - hold TF currently for possible extubation today. goal of 70cc/hr for 18hrs   - cont home protonix   - bowel regimen and movantik  - will hold home bentyl, simethicone, loperamide w/ fentanyl gtt on     #/Renal  - Crn stable (0.73)  - making good UO    #ID  //Asp Pna, persistent fevers   -  pt remains afebrile   - cont tylenol q6 PRN, LFTS mildly elevated, will cont to monitor   - cont zosyn for 7 day course for asp pna (should finish 7/18). completed course of 3 days of Zithro.   - bcx negx2, sputum cx neg, UA, RVP neg     #Endocrine  //T2DM  -  FS well controlled   - goal BG<180  - increased NPH to 4 units q6hr    #Hematologic/DVT ppx  - c/w heparin 7500 q8 subQ for DVT ppx    #Ethics  full code   Fauzia Lianres is a 52 year old with  CRPS, MO, DM2, HTN, HTN, IBS, chronic back pain, pseudotumor cerebri, recent spinal nerve stimulator placed (7/8/22 @ [facility]) who initially presented with hypoxic respiratory failure, found to have multifocal pna 2/2 aspiration pneumonia now with increased work of breathing on bipap admitted to MICU. Now s/p intubated for hypoxic resp failure on bipap and with severe ARDS likely 2/2 Asp PNA    #Neuro  Sedated on Precedex   //s/p spinal stimulator w/ chronic back pain, CRPS  - pt afebrile overnight  - cont home sertraline   - spinal cord stimulator extracted on 7/14    #Cardiovascular  //Sepsis, vasoplegia 2/2 sedation  - restarted precedex for agitation, monitor for bradycardia   - will cont to hold home nifedipineXL, and hold lisinopril, lasix     #Respiratory  //Severe ARDS likely 2/2 bilat asp pna   - CT demonstrating significant bilat pna   - /26/8/30%, pt with decreasing vent requirements  - tolerating CPAP trial today, potential extubation today   - ABG with pH of 7.39, pCO2 of 39, pO2 of 65, HCO3 24  - bronchoscopy performed. BAL with NGTD    #GI/Nutrition  //hx IBS   - hold TF currently for possible extubation today. goal of 70cc/hr for 18hrs   - cont home protonix   - bowel regimen and movantik  - will hold home bentyl, simethicone, loperamide w/ fentanyl gtt on     #/Renal  - Crn stable (0.73)  - making good UO    #ID  //Asp Pna, persistent fevers   -  pt remains afebrile   - cont tylenol q6 PRN, LFTS mildly elevated, will cont to monitor   - cont zosyn for 7 day course for asp pna (should finish 7/18). completed course of 3 days of Zithro.   - bcx negx2, sputum cx neg, UA negative   - RVP positive for hMPV     #Endocrine  //T2DM  -  FS in mid to high 200's   - goal BG<180  - increased NPH to 18 units q6hr    #Hematologic/DVT ppx  - c/w heparin 7500 q8 subQ for DVT ppx    #Ethics  full code

## 2022-07-16 NOTE — PROGRESS NOTE ADULT - ATTENDING COMMENTS
- Sedation as needed goal RASS -1 to 0  - Daily SAT/SBT  - Complete course of abx  - Trend Cr, avoid nephrotoxins  - Insulin for hyperglycemia  - DVT prophylaxis 53 y/o morbidly obese F w/chronic back pain s/p recent placement of spinal nerve stimulator prior to admission now admitted for acute hypoxemic respiratory failure secondary to likely bacterial PNA and hMPV infection. JUDD likely secondary to ATN.    - Sedation as needed goal RASS -1 to 0  - Daily SAT/SBT  - Complete course of abx  - Trend Cr, avoid nephrotoxins  - Insulin for hyperglycemia  - DVT prophylaxis

## 2022-07-16 NOTE — PROGRESS NOTE ADULT - SUBJECTIVE AND OBJECTIVE BOX
INTERVAL HPI/OVERNIGHT EVENTS:    SUBJECTIVE: Patient seen and examined at bedside. Overnight patient was mildly agitated and started on Precedex drip       VITAL SIGNS:  ICU Vital Signs Last 24 Hrs  T(C): 37.3 (16 Jul 2022 12:00), Max: 37.8 (16 Jul 2022 00:00)  T(F): 99.1 (16 Jul 2022 12:00), Max: 100 (16 Jul 2022 00:00)  HR: 58 (16 Jul 2022 13:00) (47 - 80)  BP: 153/69 (16 Jul 2022 13:00) (124/59 - 170/79)  BP(mean): 99 (16 Jul 2022 13:00) (85 - 114)  ABP: 149/67 (15 Jul 2022 16:00) (129/61 - 149/67)  ABP(mean): 97 (15 Jul 2022 16:00) (90 - 97)  RR: 19 (16 Jul 2022 13:00) (13 - 30)  SpO2: 94% (16 Jul 2022 13:00) (83% - 100%)    O2 Parameters below as of 16 Jul 2022 11:15  Patient On (Oxygen Delivery Method): ventilator          Mode: CPAP with PS, FiO2: 30, PEEP: 8, PS: 5, MAP: 10, PC: , PIP: 14  Plateau pressure:   P/F ratio:     07-15 @ 07:01  -  07-16 @ 07:00  --------------------------------------------------------  IN: 843.9 mL / OUT: 1630 mL / NET: -786.1 mL    07-16 @ 07:01  -  07-16 @ 13:05  --------------------------------------------------------  IN: 464.3 mL / OUT: 100 mL / NET: 364.3 mL      CAPILLARY BLOOD GLUCOSE      POCT Blood Glucose.: 256 mg/dL (16 Jul 2022 11:34)    ECG:    PHYSICAL EXAM:    General:   HEENT:   Neck:   Respiratory:   Cardiovascular:   Abdomen:   Extremities:  Neurological:    MEDICATIONS:  MEDICATIONS  (STANDING):  albuterol/ipratropium for Nebulization 3 milliLiter(s) Nebulizer every 6 hours  chlorhexidine 0.12% Liquid 15 milliLiter(s) Oral Mucosa every 12 hours  chlorhexidine 4% Liquid 1 Application(s) Topical <User Schedule>  dexMEDEtomidine Infusion 0.2 MICROgram(s)/kG/Hr (8.46 mL/Hr) IV Continuous <Continuous>  glucagon  Injectable 1 milliGRAM(s) IntraMuscular once  heparin   Injectable 7500 Unit(s) SubCutaneous every 8 hours  insulin lispro (ADMELOG) corrective regimen sliding scale   SubCutaneous every 6 hours  insulin NPH human recombinant 15 Unit(s) SubCutaneous every 6 hours  naloxegol 25 milliGRAM(s) Oral daily  pantoprazole  Injectable 40 milliGRAM(s) IV Push daily  piperacillin/tazobactam IVPB.. 3.375 Gram(s) IV Intermittent every 8 hours  polyethylene glycol 3350 17 Gram(s) Oral every 12 hours  senna Syrup 5 milliLiter(s) Oral at bedtime  sertraline 50 milliGRAM(s) Oral daily    MEDICATIONS  (PRN):      ALLERGIES:  Allergies    amitriptyline (Other)    Intolerances        LABS:                        8.2    13.34 )-----------( 530      ( 16 Jul 2022 00:25 )             26.5     07-16    135  |  104  |  18  ----------------------------<  336<H>  4.7   |  21<L>  |  0.73    Ca    8.3<L>      16 Jul 2022 00:25  Phos  2.5     07-16  Mg     2.3     07-16    TPro  7.4  /  Alb  2.9<L>  /  TBili  0.3  /  DBili  x   /  AST  46<H>  /  ALT  54<H>  /  AlkPhos  217<H>  07-16    PT/INR - ( 16 Jul 2022 00:25 )   PT: 12.9 sec;   INR: 1.11 ratio         PTT - ( 16 Jul 2022 00:25 )  PTT:26.4 sec      RADIOLOGY & ADDITIONAL TESTS: Reviewed.    INTERVAL HPI/OVERNIGHT EVENTS:    SUBJECTIVE: Patient seen and examined at bedside. Overnight patient was mildly agitated and started on Precedex drip       VITAL SIGNS:  ICU Vital Signs Last 24 Hrs  T(C): 37.3 (16 Jul 2022 12:00), Max: 37.8 (16 Jul 2022 00:00)  T(F): 99.1 (16 Jul 2022 12:00), Max: 100 (16 Jul 2022 00:00)  HR: 58 (16 Jul 2022 13:00) (47 - 80)  BP: 153/69 (16 Jul 2022 13:00) (124/59 - 170/79)  BP(mean): 99 (16 Jul 2022 13:00) (85 - 114)  ABP: 149/67 (15 Jul 2022 16:00) (129/61 - 149/67)  ABP(mean): 97 (15 Jul 2022 16:00) (90 - 97)  RR: 19 (16 Jul 2022 13:00) (13 - 30)  SpO2: 94% (16 Jul 2022 13:00) (83% - 100%)    O2 Parameters below as of 16 Jul 2022 11:15  Patient On (Oxygen Delivery Method): ventilator          Mode: CPAP with PS, FiO2: 30, PEEP: 8, PS: 5, MAP: 10, PC: , PIP: 14  Plateau pressure:   P/F ratio:     07-15 @ 07:01  -  07-16 @ 07:00  --------------------------------------------------------  IN: 843.9 mL / OUT: 1630 mL / NET: -786.1 mL    07-16 @ 07:01  -  07-16 @ 13:05  --------------------------------------------------------  IN: 464.3 mL / OUT: 100 mL / NET: 364.3 mL      CAPILLARY BLOOD GLUCOSE      POCT Blood Glucose.: 256 mg/dL (16 Jul 2022 11:34)    ECG:    PHYSICAL EXAM:    General: laying in bed, in no acute distress  HEENT: atraumatic, normocephalic, pinpoint pupils, normal conjunctiva/sclera    Neck: no thyromegaly, no JVD, no tender lymphadenopathy   Respiratory: clear lung fields bilaterally, no wheezes, rales, or rhonchi  Cardiovascular: regular rate and rhythm, no murmurs, rubs, or gallops   Abdomen: soft, non-tender, non-distended, normoactive bowel sound  Extremities: warm, well perfused, 2+pedal edema   Neurological: Intubated and sedated, does not follow verbal commands     MEDICATIONS:  MEDICATIONS  (STANDING):  albuterol/ipratropium for Nebulization 3 milliLiter(s) Nebulizer every 6 hours  chlorhexidine 0.12% Liquid 15 milliLiter(s) Oral Mucosa every 12 hours  chlorhexidine 4% Liquid 1 Application(s) Topical <User Schedule>  dexMEDEtomidine Infusion 0.2 MICROgram(s)/kG/Hr (8.46 mL/Hr) IV Continuous <Continuous>  glucagon  Injectable 1 milliGRAM(s) IntraMuscular once  heparin   Injectable 7500 Unit(s) SubCutaneous every 8 hours  insulin lispro (ADMELOG) corrective regimen sliding scale   SubCutaneous every 6 hours  insulin NPH human recombinant 15 Unit(s) SubCutaneous every 6 hours  naloxegol 25 milliGRAM(s) Oral daily  pantoprazole  Injectable 40 milliGRAM(s) IV Push daily  piperacillin/tazobactam IVPB.. 3.375 Gram(s) IV Intermittent every 8 hours  polyethylene glycol 3350 17 Gram(s) Oral every 12 hours  senna Syrup 5 milliLiter(s) Oral at bedtime  sertraline 50 milliGRAM(s) Oral daily    MEDICATIONS  (PRN):      ALLERGIES:  Allergies    amitriptyline (Other)    Intolerances        LABS:                        8.2    13.34 )-----------( 530      ( 16 Jul 2022 00:25 )             26.5     07-16    135  |  104  |  18  ----------------------------<  336<H>  4.7   |  21<L>  |  0.73    Ca    8.3<L>      16 Jul 2022 00:25  Phos  2.5     07-16  Mg     2.3     07-16    TPro  7.4  /  Alb  2.9<L>  /  TBili  0.3  /  DBili  x   /  AST  46<H>  /  ALT  54<H>  /  AlkPhos  217<H>  07-16    PT/INR - ( 16 Jul 2022 00:25 )   PT: 12.9 sec;   INR: 1.11 ratio         PTT - ( 16 Jul 2022 00:25 )  PTT:26.4 sec      RADIOLOGY & ADDITIONAL TESTS: Reviewed.

## 2022-07-17 LAB
ALBUMIN SERPL ELPH-MCNC: 3 G/DL — LOW (ref 3.3–5)
ALP SERPL-CCNC: 203 U/L — HIGH (ref 40–120)
ALT FLD-CCNC: 44 U/L — SIGNIFICANT CHANGE UP (ref 10–45)
ANION GAP SERPL CALC-SCNC: 12 MMOL/L — SIGNIFICANT CHANGE UP (ref 5–17)
APTT BLD: 29 SEC — SIGNIFICANT CHANGE UP (ref 27.5–35.5)
AST SERPL-CCNC: 29 U/L — SIGNIFICANT CHANGE UP (ref 10–40)
BASE EXCESS BLDV CALC-SCNC: 1 MMOL/L — SIGNIFICANT CHANGE UP (ref -2–2)
BASE EXCESS BLDV CALC-SCNC: 2.6 MMOL/L — HIGH (ref -2–2)
BILIRUB SERPL-MCNC: 0.3 MG/DL — SIGNIFICANT CHANGE UP (ref 0.2–1.2)
BUN SERPL-MCNC: 18 MG/DL — SIGNIFICANT CHANGE UP (ref 7–23)
CALCIUM SERPL-MCNC: 8.8 MG/DL — SIGNIFICANT CHANGE UP (ref 8.4–10.5)
CHLORIDE SERPL-SCNC: 105 MMOL/L — SIGNIFICANT CHANGE UP (ref 96–108)
CO2 BLDV-SCNC: 26 MMOL/L — SIGNIFICANT CHANGE UP (ref 22–26)
CO2 BLDV-SCNC: 28 MMOL/L — HIGH (ref 22–26)
CO2 SERPL-SCNC: 22 MMOL/L — SIGNIFICANT CHANGE UP (ref 22–31)
CREAT SERPL-MCNC: 0.82 MG/DL — SIGNIFICANT CHANGE UP (ref 0.5–1.3)
CULTURE RESULTS: SIGNIFICANT CHANGE UP
CULTURE RESULTS: SIGNIFICANT CHANGE UP
EGFR: 86 ML/MIN/1.73M2 — SIGNIFICANT CHANGE UP
GAS PNL BLDV: SIGNIFICANT CHANGE UP
GLUCOSE BLDC GLUCOMTR-MCNC: 176 MG/DL — HIGH (ref 70–99)
GLUCOSE BLDC GLUCOMTR-MCNC: 218 MG/DL — HIGH (ref 70–99)
GLUCOSE BLDC GLUCOMTR-MCNC: 239 MG/DL — HIGH (ref 70–99)
GLUCOSE BLDC GLUCOMTR-MCNC: 312 MG/DL — HIGH (ref 70–99)
GLUCOSE SERPL-MCNC: 302 MG/DL — HIGH (ref 70–99)
HCO3 BLDV-SCNC: 25 MMOL/L — SIGNIFICANT CHANGE UP (ref 22–29)
HCO3 BLDV-SCNC: 27 MMOL/L — SIGNIFICANT CHANGE UP (ref 22–29)
HCT VFR BLD CALC: 27.2 % — LOW (ref 34.5–45)
HGB BLD-MCNC: 8.4 G/DL — LOW (ref 11.5–15.5)
INR BLD: 1.16 RATIO — SIGNIFICANT CHANGE UP (ref 0.88–1.16)
MAGNESIUM SERPL-MCNC: 2 MG/DL — SIGNIFICANT CHANGE UP (ref 1.6–2.6)
MCHC RBC-ENTMCNC: 28.9 PG — SIGNIFICANT CHANGE UP (ref 27–34)
MCHC RBC-ENTMCNC: 30.9 GM/DL — LOW (ref 32–36)
MCV RBC AUTO: 93.5 FL — SIGNIFICANT CHANGE UP (ref 80–100)
NRBC # BLD: 0 /100 WBCS — SIGNIFICANT CHANGE UP (ref 0–0)
PCO2 BLDV: 38 MMHG — LOW (ref 39–42)
PCO2 BLDV: 41 MMHG — SIGNIFICANT CHANGE UP (ref 39–42)
PH BLDV: 7.43 — SIGNIFICANT CHANGE UP (ref 7.32–7.43)
PH BLDV: 7.43 — SIGNIFICANT CHANGE UP (ref 7.32–7.43)
PHOSPHATE SERPL-MCNC: 2.1 MG/DL — LOW (ref 2.5–4.5)
PLATELET # BLD AUTO: 598 K/UL — HIGH (ref 150–400)
PO2 BLDV: 40 MMHG — SIGNIFICANT CHANGE UP (ref 25–45)
PO2 BLDV: 54 MMHG — HIGH (ref 25–45)
POTASSIUM SERPL-MCNC: 4.1 MMOL/L — SIGNIFICANT CHANGE UP (ref 3.5–5.3)
POTASSIUM SERPL-SCNC: 4.1 MMOL/L — SIGNIFICANT CHANGE UP (ref 3.5–5.3)
PROT SERPL-MCNC: 7.4 G/DL — SIGNIFICANT CHANGE UP (ref 6–8.3)
PROTHROM AB SERPL-ACNC: 13.4 SEC — SIGNIFICANT CHANGE UP (ref 10.5–13.4)
RBC # BLD: 2.91 M/UL — LOW (ref 3.8–5.2)
RBC # FLD: 14.3 % — SIGNIFICANT CHANGE UP (ref 10.3–14.5)
SAO2 % BLDV: 67.7 % — SIGNIFICANT CHANGE UP (ref 67–88)
SAO2 % BLDV: 86.7 % — SIGNIFICANT CHANGE UP (ref 67–88)
SARS-COV-2 RNA SPEC QL NAA+PROBE: SIGNIFICANT CHANGE UP
SODIUM SERPL-SCNC: 139 MMOL/L — SIGNIFICANT CHANGE UP (ref 135–145)
SPECIMEN SOURCE: SIGNIFICANT CHANGE UP
SPECIMEN SOURCE: SIGNIFICANT CHANGE UP
WBC # BLD: 15.11 K/UL — HIGH (ref 3.8–10.5)
WBC # FLD AUTO: 15.11 K/UL — HIGH (ref 3.8–10.5)

## 2022-07-17 PROCEDURE — 99291 CRITICAL CARE FIRST HOUR: CPT

## 2022-07-17 RX ORDER — HYDROMORPHONE HYDROCHLORIDE 2 MG/ML
1 INJECTION INTRAMUSCULAR; INTRAVENOUS; SUBCUTANEOUS EVERY 8 HOURS
Refills: 0 | Status: DISCONTINUED | OUTPATIENT
Start: 2022-07-17 | End: 2022-07-17

## 2022-07-17 RX ORDER — HYDRALAZINE HCL 50 MG
10 TABLET ORAL THREE TIMES A DAY
Refills: 0 | Status: DISCONTINUED | OUTPATIENT
Start: 2022-07-17 | End: 2022-07-18

## 2022-07-17 RX ORDER — ACETAMINOPHEN 500 MG
1000 TABLET ORAL ONCE
Refills: 0 | Status: COMPLETED | OUTPATIENT
Start: 2022-07-17 | End: 2022-07-17

## 2022-07-17 RX ORDER — DEXMEDETOMIDINE HYDROCHLORIDE IN 0.9% SODIUM CHLORIDE 4 UG/ML
0.2 INJECTION INTRAVENOUS
Qty: 200 | Refills: 0 | Status: DISCONTINUED | OUTPATIENT
Start: 2022-07-17 | End: 2022-07-18

## 2022-07-17 RX ORDER — NICARDIPINE HYDROCHLORIDE 30 MG/1
5 CAPSULE, EXTENDED RELEASE ORAL
Qty: 40 | Refills: 0 | Status: DISCONTINUED | OUTPATIENT
Start: 2022-07-17 | End: 2022-07-17

## 2022-07-17 RX ORDER — POTASSIUM PHOSPHATE, MONOBASIC POTASSIUM PHOSPHATE, DIBASIC 236; 224 MG/ML; MG/ML
15 INJECTION, SOLUTION INTRAVENOUS ONCE
Refills: 0 | Status: COMPLETED | OUTPATIENT
Start: 2022-07-17 | End: 2022-07-17

## 2022-07-17 RX ORDER — KETOROLAC TROMETHAMINE 30 MG/ML
15 SYRINGE (ML) INJECTION ONCE
Refills: 0 | Status: DISCONTINUED | OUTPATIENT
Start: 2022-07-17 | End: 2022-07-17

## 2022-07-17 RX ORDER — NALOXEGOL OXALATE 12.5 MG/1
25 TABLET, FILM COATED ORAL DAILY
Refills: 0 | Status: DISCONTINUED | OUTPATIENT
Start: 2022-07-17 | End: 2022-07-23

## 2022-07-17 RX ORDER — FUROSEMIDE 40 MG
40 TABLET ORAL ONCE
Refills: 0 | Status: COMPLETED | OUTPATIENT
Start: 2022-07-17 | End: 2022-07-17

## 2022-07-17 RX ORDER — HYDROMORPHONE HYDROCHLORIDE 2 MG/ML
1 INJECTION INTRAMUSCULAR; INTRAVENOUS; SUBCUTANEOUS EVERY 6 HOURS
Refills: 0 | Status: DISCONTINUED | OUTPATIENT
Start: 2022-07-17 | End: 2022-07-18

## 2022-07-17 RX ORDER — HYDROMORPHONE HYDROCHLORIDE 2 MG/ML
0.5 INJECTION INTRAMUSCULAR; INTRAVENOUS; SUBCUTANEOUS
Refills: 0 | Status: DISCONTINUED | OUTPATIENT
Start: 2022-07-17 | End: 2022-07-17

## 2022-07-17 RX ORDER — HYDROMORPHONE HYDROCHLORIDE 2 MG/ML
0.5 INJECTION INTRAMUSCULAR; INTRAVENOUS; SUBCUTANEOUS ONCE
Refills: 0 | Status: DISCONTINUED | OUTPATIENT
Start: 2022-07-17 | End: 2022-07-17

## 2022-07-17 RX ORDER — HUMAN INSULIN 100 [IU]/ML
30 INJECTION, SUSPENSION SUBCUTANEOUS EVERY 6 HOURS
Refills: 0 | Status: DISCONTINUED | OUTPATIENT
Start: 2022-07-17 | End: 2022-07-23

## 2022-07-17 RX ORDER — POLYETHYLENE GLYCOL 3350 17 G/17G
17 POWDER, FOR SOLUTION ORAL EVERY 12 HOURS
Refills: 0 | Status: DISCONTINUED | OUTPATIENT
Start: 2022-07-17 | End: 2022-07-23

## 2022-07-17 RX ORDER — NIFEDIPINE 30 MG
60 TABLET, EXTENDED RELEASE 24 HR ORAL DAILY
Refills: 0 | Status: DISCONTINUED | OUTPATIENT
Start: 2022-07-17 | End: 2022-07-17

## 2022-07-17 RX ORDER — SERTRALINE 25 MG/1
50 TABLET, FILM COATED ORAL DAILY
Refills: 0 | Status: DISCONTINUED | OUTPATIENT
Start: 2022-07-17 | End: 2022-07-23

## 2022-07-17 RX ORDER — SENNA PLUS 8.6 MG/1
5 TABLET ORAL AT BEDTIME
Refills: 0 | Status: DISCONTINUED | OUTPATIENT
Start: 2022-07-17 | End: 2022-07-23

## 2022-07-17 RX ORDER — ACETAMINOPHEN 500 MG
650 TABLET ORAL ONCE
Refills: 0 | Status: COMPLETED | OUTPATIENT
Start: 2022-07-17 | End: 2022-07-17

## 2022-07-17 RX ORDER — LABETALOL HCL 100 MG
10 TABLET ORAL EVERY 6 HOURS
Refills: 0 | Status: DISCONTINUED | OUTPATIENT
Start: 2022-07-17 | End: 2022-07-19

## 2022-07-17 RX ORDER — PROPOFOL 10 MG/ML
15 INJECTION, EMULSION INTRAVENOUS
Qty: 1000 | Refills: 0 | Status: DISCONTINUED | OUTPATIENT
Start: 2022-07-17 | End: 2022-07-17

## 2022-07-17 RX ORDER — HUMAN INSULIN 100 [IU]/ML
20 INJECTION, SUSPENSION SUBCUTANEOUS ONCE
Refills: 0 | Status: COMPLETED | OUTPATIENT
Start: 2022-07-17 | End: 2022-07-17

## 2022-07-17 RX ADMIN — Medication 10 MILLIGRAM(S): at 23:32

## 2022-07-17 RX ADMIN — HEPARIN SODIUM 7500 UNIT(S): 5000 INJECTION INTRAVENOUS; SUBCUTANEOUS at 06:21

## 2022-07-17 RX ADMIN — HYDROMORPHONE HYDROCHLORIDE 1 MILLIGRAM(S): 2 INJECTION INTRAMUSCULAR; INTRAVENOUS; SUBCUTANEOUS at 22:29

## 2022-07-17 RX ADMIN — PANTOPRAZOLE SODIUM 40 MILLIGRAM(S): 20 TABLET, DELAYED RELEASE ORAL at 11:44

## 2022-07-17 RX ADMIN — Medication 3 MILLILITER(S): at 23:59

## 2022-07-17 RX ADMIN — Medication 3 MILLILITER(S): at 05:33

## 2022-07-17 RX ADMIN — Medication 3 MILLILITER(S): at 17:33

## 2022-07-17 RX ADMIN — HUMAN INSULIN 30 UNIT(S): 100 INJECTION, SUSPENSION SUBCUTANEOUS at 06:22

## 2022-07-17 RX ADMIN — PIPERACILLIN AND TAZOBACTAM 25 GRAM(S): 4; .5 INJECTION, POWDER, LYOPHILIZED, FOR SOLUTION INTRAVENOUS at 23:32

## 2022-07-17 RX ADMIN — HYDROMORPHONE HYDROCHLORIDE 0.5 MILLIGRAM(S): 2 INJECTION INTRAMUSCULAR; INTRAVENOUS; SUBCUTANEOUS at 20:30

## 2022-07-17 RX ADMIN — HYDROMORPHONE HYDROCHLORIDE 0.75 MILLIGRAM(S): 2 INJECTION INTRAMUSCULAR; INTRAVENOUS; SUBCUTANEOUS at 06:21

## 2022-07-17 RX ADMIN — Medication 10 MILLIGRAM(S): at 17:24

## 2022-07-17 RX ADMIN — Medication 3 MILLILITER(S): at 11:05

## 2022-07-17 RX ADMIN — HEPARIN SODIUM 7500 UNIT(S): 5000 INJECTION INTRAVENOUS; SUBCUTANEOUS at 22:29

## 2022-07-17 RX ADMIN — Medication 40 MILLIGRAM(S): at 08:45

## 2022-07-17 RX ADMIN — Medication 650 MILLIGRAM(S): at 03:59

## 2022-07-17 RX ADMIN — Medication 4: at 11:28

## 2022-07-17 RX ADMIN — HEPARIN SODIUM 7500 UNIT(S): 5000 INJECTION INTRAVENOUS; SUBCUTANEOUS at 13:16

## 2022-07-17 RX ADMIN — HYDROMORPHONE HYDROCHLORIDE 0.5 MILLIGRAM(S): 2 INJECTION INTRAMUSCULAR; INTRAVENOUS; SUBCUTANEOUS at 19:48

## 2022-07-17 RX ADMIN — Medication 1 MILLIGRAM(S): at 11:28

## 2022-07-17 RX ADMIN — HYDROMORPHONE HYDROCHLORIDE 1 MILLIGRAM(S): 2 INJECTION INTRAMUSCULAR; INTRAVENOUS; SUBCUTANEOUS at 23:00

## 2022-07-17 RX ADMIN — Medication 10 MILLIGRAM(S): at 09:37

## 2022-07-17 RX ADMIN — HYDROMORPHONE HYDROCHLORIDE 1 MILLIGRAM(S): 2 INJECTION INTRAMUSCULAR; INTRAVENOUS; SUBCUTANEOUS at 13:30

## 2022-07-17 RX ADMIN — Medication 10 MILLIGRAM(S): at 00:27

## 2022-07-17 RX ADMIN — Medication 2: at 17:25

## 2022-07-17 RX ADMIN — Medication 15 MILLIGRAM(S): at 18:45

## 2022-07-17 RX ADMIN — HYDROMORPHONE HYDROCHLORIDE 1 MILLIGRAM(S): 2 INJECTION INTRAMUSCULAR; INTRAVENOUS; SUBCUTANEOUS at 13:16

## 2022-07-17 RX ADMIN — HYDROMORPHONE HYDROCHLORIDE 0.75 MILLIGRAM(S): 2 INJECTION INTRAMUSCULAR; INTRAVENOUS; SUBCUTANEOUS at 00:00

## 2022-07-17 RX ADMIN — POTASSIUM PHOSPHATE, MONOBASIC POTASSIUM PHOSPHATE, DIBASIC 62.5 MILLIMOLE(S): 236; 224 INJECTION, SOLUTION INTRAVENOUS at 01:40

## 2022-07-17 RX ADMIN — CHLORHEXIDINE GLUCONATE 1 APPLICATION(S): 213 SOLUTION TOPICAL at 06:22

## 2022-07-17 RX ADMIN — Medication 8: at 00:27

## 2022-07-17 RX ADMIN — Medication 10 MILLIGRAM(S): at 11:00

## 2022-07-17 RX ADMIN — Medication 15 MILLIGRAM(S): at 18:30

## 2022-07-17 RX ADMIN — HUMAN INSULIN 20 UNIT(S): 100 INJECTION, SUSPENSION SUBCUTANEOUS at 00:27

## 2022-07-17 RX ADMIN — HUMAN INSULIN 20 UNIT(S): 100 INJECTION, SUSPENSION SUBCUTANEOUS at 17:24

## 2022-07-17 RX ADMIN — Medication 4: at 06:24

## 2022-07-17 RX ADMIN — HUMAN INSULIN 30 UNIT(S): 100 INJECTION, SUSPENSION SUBCUTANEOUS at 11:29

## 2022-07-17 RX ADMIN — Medication 1000 MILLIGRAM(S): at 09:00

## 2022-07-17 RX ADMIN — HYDROMORPHONE HYDROCHLORIDE 0.75 MILLIGRAM(S): 2 INJECTION INTRAMUSCULAR; INTRAVENOUS; SUBCUTANEOUS at 07:05

## 2022-07-17 RX ADMIN — PIPERACILLIN AND TAZOBACTAM 25 GRAM(S): 4; .5 INJECTION, POWDER, LYOPHILIZED, FOR SOLUTION INTRAVENOUS at 08:47

## 2022-07-17 RX ADMIN — DEXMEDETOMIDINE HYDROCHLORIDE IN 0.9% SODIUM CHLORIDE 8.46 MICROGRAM(S)/KG/HR: 4 INJECTION INTRAVENOUS at 11:28

## 2022-07-17 RX ADMIN — PIPERACILLIN AND TAZOBACTAM 25 GRAM(S): 4; .5 INJECTION, POWDER, LYOPHILIZED, FOR SOLUTION INTRAVENOUS at 15:53

## 2022-07-17 RX ADMIN — Medication 400 MILLIGRAM(S): at 08:45

## 2022-07-17 RX ADMIN — Medication 650 MILLIGRAM(S): at 05:00

## 2022-07-17 NOTE — AIRWAY REMOVAL NOTE  ADULT & PEDS - ARTIFICAL AIRWAY REMOVAL COMMENTS
Written order for extubation verified. The patient was identified by full name and birth date compared to the identification band. Present during the procedure was TERESA Escalante

## 2022-07-17 NOTE — PROGRESS NOTE ADULT - NUTRITIONAL ASSESSMENT
This patient has been assessed with a concern for Malnutrition and has been determined to have a diagnosis/diagnoses of Morbid obesity (BMI > 40).

## 2022-07-17 NOTE — PROGRESS NOTE ADULT - ASSESSMENT
Fauzia Linares is a 52 year old with  CRPS, MO, DM2, HTN, HTN, IBS, chronic back pain, pseudotumor cerebri, recent spinal nerve stimulator placed (7/8/22 @ [facility]) who initially presented with hypoxic respiratory failure, found to have multifocal pna 2/2 aspiration pneumonia now with increased work of breathing on bipap admitted to MICU. Now s/p intubated for hypoxic resp failure on bipap and with severe ARDS likely 2/2 Asp PNA    #Neuro  Sedated on Precedex   //s/p spinal stimulator w/ chronic back pain, CRPS  - pt afebrile overnight  - cont home sertraline   - spinal cord stimulator extracted on 7/14    #Cardiovascular  //Sepsis, vasoplegia 2/2 sedation  - restarted precedex for agitation, monitor for bradycardia   - will cont to hold home nifedipineXL, and hold lisinopril, lasix     #Respiratory  //Severe ARDS likely 2/2 bilat asp pna   - CT demonstrating significant bilat pna   - /26/8/30%, pt with decreasing vent requirements  - tolerating CPAP trial today, potential extubation today   - ABG with pH of 7.39, pCO2 of 39, pO2 of 65, HCO3 24  - bronchoscopy performed. BAL with NGTD    #GI/Nutrition  //hx IBS   - hold TF currently for possible extubation today. goal of 70cc/hr for 18hrs   - cont home protonix   - bowel regimen and movantik  - will hold home bentyl, simethicone, loperamide w/ fentanyl gtt on     #/Renal  - Crn stable (0.73)  - making good UO    #ID  //Asp Pna, persistent fevers   -  pt remains afebrile   - cont tylenol q6 PRN, LFTS mildly elevated, will cont to monitor   - cont zosyn for 7 day course for asp pna (should finish 7/18). completed course of 3 days of Zithro.   - bcx negx2, sputum cx neg, UA negative   - RVP positive for hMPV     #Endocrine  //T2DM  -  FS in mid to high 200's   - goal BG<180  - increased NPH to 18 units q6hr    #Hematologic/DVT ppx  - c/w heparin 7500 q8 subQ for DVT ppx    #Ethics  full code   Fauzia Linares is a 52 year old with  CRPS, MO, DM2, HTN, HTN, IBS, chronic back pain, pseudotumor cerebri, recent spinal nerve stimulator placed (7/8/22 @ [facility]) who initially presented with hypoxic respiratory failure, found to have multifocal pna 2/2 aspiration pneumonia now with increased work of breathing on bipap admitted to MICU. Now s/p intubated for hypoxic resp failure on bipap and with severe ARDS likely 2/2 Asp PNA    #Neuro  Pt extubated on 7/17   //s/p spinal stimulator w/ chronic back pain, CRPS. Spinal cord stimulator extracted on 7/14  - pt 's dilaudid dose increased to 1  - cont home sertraline     #Cardiovascular  //Sepsis, vasoplegia 2/2 sedation  - restarted precedex and added ativan 1 once for agitation, monitor for bradycardia   - Restarted home nifedipineXL,lisinopril, lasix   - Added cardine drip and labetalol for better pressure control, as pt has b lines from flash    #Respiratory  //Severe ARDS likely 2/2 bilat asp pna   - CT demonstrating significant bilat pna   - pt on Bipap, anxious  - VBG pH 7.43, pCO2 38, pO2 40, HCO3 25  - B lines seen on POCUS on 7/17, in setting of hypertension, restarted BP meds and added cardine and labetalol    #GI/Nutrition  //hx IBS   - NPO diet for now, until swallow eval  - cont home protonix   - bowel regimen and movantik  - will hold home bentyl, simethicone, loperamide w/ fentanyl gtt on     #/Renal  - Crn stable (0.73)  - making good UO    #ID  //Asp Pna, persistent fevers   -  pt remains afebrile   - cont tylenol q6 PRN, LFTS mildly elevated, will cont to monitor   - cont zosyn for 7 day course for asp pna (should finish 7/18). completed course of 3 days of Zithro.   - bcx negx2, sputum cx neg, UA negative   - RVP positive for hMPV     #Endocrine  //T2DM  -  FS in low 200's   - goal BG<180  - increased NPH to 30 units q6hr    #Hematologic/DVT ppx  - c/w heparin 7500 q8 subQ for DVT ppx    #Ethics  full code

## 2022-07-17 NOTE — PROGRESS NOTE ADULT - ATTENDING COMMENTS
53 y/o morbidly obese F w/chronic back pain s/p recent placement of spinal nerve stimulator prior to admission now admitted for acute hypoxemic respiratory failure secondary to likely bacterial PNA and hMPV infection. JUDD likely secondary to ATN.    - Pain control  - BiPAP as needed and at night, supplemental O2 as needed  - Restart home antihypertensives  - Complete course of abx  - Diuresis goal net negative ~ 1L  - Trend Cr, avoid nephrotoxins  - Insulin for hyperglycemia  - DVT prophylaxis

## 2022-07-17 NOTE — PROGRESS NOTE ADULT - SUBJECTIVE AND OBJECTIVE BOX
INTERVAL HPI/OVERNIGHT EVENTS: Pt extubated this morning.    SUBJECTIVE: Patient seen and examined at bedside. Overnight patient was mildly agitated and started on Precedex drip       VITAL SIGNS:  ICU Vital Signs Last 24 Hrs  T(C): 37.4 (17 Jul 2022 05:00), Max: 37.7 (17 Jul 2022 00:00)  T(F): 99.3 (17 Jul 2022 05:00), Max: 99.9 (17 Jul 2022 00:00)  HR: 52 (17 Jul 2022 06:00) (49 - 73)  BP: 130/63 (17 Jul 2022 06:00) (128/63 - 204/85)  BP(mean): 89 (17 Jul 2022 06:00) (87 - 131)  ABP: --  ABP(mean): --  RR: 31 (17 Jul 2022 06:00) (17 - 31)  SpO2: 100% (17 Jul 2022 06:00) (89% - 100%)    O2 Parameters below as of 17 Jul 2022 05:55      O2 Concentration (%): 30            Mode: CPAP with PS, FiO2: 30, PEEP: 8, PS: 5, MAP: 10, PC: , PIP: 14  Plateau pressure:   P/F ratio:   I&O's Summary    16 Jul 2022 07:01  -  17 Jul 2022 07:00  --------------------------------------------------------  IN: 1435 mL / OUT: 1510 mL / NET: -75 mL          CAPILLARY BLOOD GLUCOSE      POCT Blood Glucose.: 256 mg/dL (16 Jul 2022 11:34)    ECG:    PHYSICAL EXAM:    General: laying in bed, in no acute distress  HEENT: atraumatic, normocephalic, pinpoint pupils, normal conjunctiva/sclera    Neck: no thyromegaly, no JVD, no tender lymphadenopathy   Respiratory: clear lung fields bilaterally, no wheezes, rales, or rhonchi  Cardiovascular: regular rate and rhythm, no murmurs, rubs, or gallops   Abdomen: soft, non-tender, non-distended, normoactive bowel sound  Extremities: warm, well perfused, 2+pedal edema   Neurological: Intubated and sedated, does not follow verbal commands     MEDICATIONS:  MEDICATIONS  (STANDING):  albuterol/ipratropium for Nebulization 3 milliLiter(s) Nebulizer every 6 hours  chlorhexidine 0.12% Liquid 15 milliLiter(s) Oral Mucosa every 12 hours  chlorhexidine 4% Liquid 1 Application(s) Topical <User Schedule>  dexMEDEtomidine Infusion 0.2 MICROgram(s)/kG/Hr (8.46 mL/Hr) IV Continuous <Continuous>  glucagon  Injectable 1 milliGRAM(s) IntraMuscular once  heparin   Injectable 7500 Unit(s) SubCutaneous every 8 hours  insulin lispro (ADMELOG) corrective regimen sliding scale   SubCutaneous every 6 hours  insulin NPH human recombinant 15 Unit(s) SubCutaneous every 6 hours  naloxegol 25 milliGRAM(s) Oral daily  pantoprazole  Injectable 40 milliGRAM(s) IV Push daily  piperacillin/tazobactam IVPB.. 3.375 Gram(s) IV Intermittent every 8 hours  polyethylene glycol 3350 17 Gram(s) Oral every 12 hours  senna Syrup 5 milliLiter(s) Oral at bedtime  sertraline 50 milliGRAM(s) Oral daily    MEDICATIONS  (PRN):      ALLERGIES:  Allergies    amitriptyline (Other)    Intolerances        LABS:  CBC Full  -  ( 17 Jul 2022 00:19 )  WBC Count : 15.11 K/uL  RBC Count : 2.91 M/uL  Hemoglobin : 8.4 g/dL  Hematocrit : 27.2 %  Platelet Count - Automated : 598 K/uL  Mean Cell Volume : 93.5 fl  Mean Cell Hemoglobin : 28.9 pg  Mean Cell Hemoglobin Concentration : 30.9 gm/dL  Auto Neutrophil # : x  Auto Lymphocyte # : x  Auto Monocyte # : x  Auto Eosinophil # : x  Auto Basophil # : x  Auto Neutrophil % : x  Auto Lymphocyte % : x  Auto Monocyte % : x  Auto Eosinophil % : x  Auto Basophil % : x    07-17    139  |  105  |  18  ----------------------------<  302<H>  4.1   |  22  |  0.82    Ca    8.8      17 Jul 2022 00:19  Phos  2.1     07-17  Mg     2.0     07-17    TPro  7.4  /  Alb  3.0<L>  /  TBili  0.3  /  DBili  x   /  AST  29  /  ALT  44  /  AlkPhos  203<H>  07-17      RADIOLOGY & ADDITIONAL TESTS: Reviewed.    INTERVAL HPI/OVERNIGHT EVENTS: Pt extubated this morning. Pt anxious and hypertensive on Bipap, with B lines seen with POCUS this morning. Started cardine drip and labetalol standing dose, restarted precedex, gave 1 ativan    SUBJECTIVE: Patient seen and examined at bedside. Overnight patient was mildly agitated and started on Precedex drip. Pt stated she is in pain, adjusted IV dilaudid dose.      VITAL SIGNS:  ICU Vital Signs Last 24 Hrs  T(C): 37.4 (17 Jul 2022 05:00), Max: 37.7 (17 Jul 2022 00:00)  T(F): 99.3 (17 Jul 2022 05:00), Max: 99.9 (17 Jul 2022 00:00)  HR: 52 (17 Jul 2022 06:00) (49 - 73)  BP: 130/63 (17 Jul 2022 06:00) (128/63 - 204/85)  BP(mean): 89 (17 Jul 2022 06:00) (87 - 131)  ABP: --  ABP(mean): --  RR: 31 (17 Jul 2022 06:00) (17 - 31)  SpO2: 100% (17 Jul 2022 06:00) (89% - 100%)    O2 Parameters below as of 17 Jul 2022 05:55      O2 Concentration (%): 30      Mode: CPAP with PS, FiO2: 30, PEEP: 8, PS: 5, MAP: 10, PC: , PIP: 14  Plateau pressure:   P/F ratio:   I&O's Summary    16 Jul 2022 07:01  -  17 Jul 2022 07:00  --------------------------------------------------------  IN: 1435 mL / OUT: 1510 mL / NET: -75 mL    CAPILLARY BLOOD GLUCOSE    POCT Blood Glucose.: 256 mg/dL (16 Jul 2022 11:34)    ECG:    PHYSICAL EXAM:    General: laying in bed, in mild distress  HEENT: atraumatic, normocephalic, pinpoint pupils, normal conjunctiva/sclera    Neck: no thyromegaly, no JVD, no tender lymphadenopathy   Respiratory: clear lung fields bilaterally, no wheezes, rales, or rhonchi  Cardiovascular: regular rate and rhythm, no murmurs, rubs, or gallops   Abdomen: soft, non-tender, non-distended, normoactive bowel sound  Extremities: warm, well perfused, 2+pedal edema   Neurological: Pt follows commands, able to speak few words in low volume.    MEDICATIONS:  MEDICATIONS  (STANDING):  albuterol/ipratropium for Nebulization 3 milliLiter(s) Nebulizer every 6 hours  chlorhexidine 0.12% Liquid 15 milliLiter(s) Oral Mucosa every 12 hours  chlorhexidine 4% Liquid 1 Application(s) Topical <User Schedule>  dexMEDEtomidine Infusion 0.2 MICROgram(s)/kG/Hr (8.46 mL/Hr) IV Continuous <Continuous>  glucagon  Injectable 1 milliGRAM(s) IntraMuscular once  heparin   Injectable 7500 Unit(s) SubCutaneous every 8 hours  insulin lispro (ADMELOG) corrective regimen sliding scale   SubCutaneous every 6 hours  insulin NPH human recombinant 15 Unit(s) SubCutaneous every 6 hours  naloxegol 25 milliGRAM(s) Oral daily  pantoprazole  Injectable 40 milliGRAM(s) IV Push daily  piperacillin/tazobactam IVPB.. 3.375 Gram(s) IV Intermittent every 8 hours  polyethylene glycol 3350 17 Gram(s) Oral every 12 hours  senna Syrup 5 milliLiter(s) Oral at bedtime  sertraline 50 milliGRAM(s) Oral daily    MEDICATIONS  (PRN):      ALLERGIES:  Allergies    amitriptyline (Other)    Intolerances        LABS:  CBC Full  -  ( 17 Jul 2022 00:19 )  WBC Count : 15.11 K/uL  RBC Count : 2.91 M/uL  Hemoglobin : 8.4 g/dL  Hematocrit : 27.2 %  Platelet Count - Automated : 598 K/uL  Mean Cell Volume : 93.5 fl  Mean Cell Hemoglobin : 28.9 pg  Mean Cell Hemoglobin Concentration : 30.9 gm/dL  Auto Neutrophil # : x  Auto Lymphocyte # : x  Auto Monocyte # : x  Auto Eosinophil # : x  Auto Basophil # : x  Auto Neutrophil % : x  Auto Lymphocyte % : x  Auto Monocyte % : x  Auto Eosinophil % : x  Auto Basophil % : x    07-17    139  |  105  |  18  ----------------------------<  302<H>  4.1   |  22  |  0.82    Ca    8.8      17 Jul 2022 00:19  Phos  2.1     07-17  Mg     2.0     07-17    TPro  7.4  /  Alb  3.0<L>  /  TBili  0.3  /  DBili  x   /  AST  29  /  ALT  44  /  AlkPhos  203<H>  07-17      RADIOLOGY & ADDITIONAL TESTS: Reviewed.

## 2022-07-18 LAB
ALBUMIN SERPL ELPH-MCNC: 3.2 G/DL — LOW (ref 3.3–5)
ALP SERPL-CCNC: 189 U/L — HIGH (ref 40–120)
ALT FLD-CCNC: 40 U/L — SIGNIFICANT CHANGE UP (ref 10–45)
ANION GAP SERPL CALC-SCNC: 12 MMOL/L — SIGNIFICANT CHANGE UP (ref 5–17)
APTT BLD: 26.7 SEC — LOW (ref 27.5–35.5)
AST SERPL-CCNC: 38 U/L — SIGNIFICANT CHANGE UP (ref 10–40)
BASE EXCESS BLDV CALC-SCNC: 1.7 MMOL/L — SIGNIFICANT CHANGE UP (ref -2–2)
BILIRUB SERPL-MCNC: 0.4 MG/DL — SIGNIFICANT CHANGE UP (ref 0.2–1.2)
BUN SERPL-MCNC: 18 MG/DL — SIGNIFICANT CHANGE UP (ref 7–23)
CALCIUM SERPL-MCNC: 8.7 MG/DL — SIGNIFICANT CHANGE UP (ref 8.4–10.5)
CHLORIDE SERPL-SCNC: 106 MMOL/L — SIGNIFICANT CHANGE UP (ref 96–108)
CO2 BLDV-SCNC: 29 MMOL/L — HIGH (ref 22–26)
CO2 SERPL-SCNC: 24 MMOL/L — SIGNIFICANT CHANGE UP (ref 22–31)
CREAT SERPL-MCNC: 0.8 MG/DL — SIGNIFICANT CHANGE UP (ref 0.5–1.3)
EGFR: 89 ML/MIN/1.73M2 — SIGNIFICANT CHANGE UP
GAS PNL BLDV: SIGNIFICANT CHANGE UP
GLUCOSE BLDC GLUCOMTR-MCNC: 111 MG/DL — HIGH (ref 70–99)
GLUCOSE BLDC GLUCOMTR-MCNC: 150 MG/DL — HIGH (ref 70–99)
GLUCOSE BLDC GLUCOMTR-MCNC: 197 MG/DL — HIGH (ref 70–99)
GLUCOSE BLDC GLUCOMTR-MCNC: 214 MG/DL — HIGH (ref 70–99)
GLUCOSE SERPL-MCNC: 113 MG/DL — HIGH (ref 70–99)
HCO3 BLDV-SCNC: 27 MMOL/L — SIGNIFICANT CHANGE UP (ref 22–29)
HCT VFR BLD CALC: 28.6 % — LOW (ref 34.5–45)
HGB BLD-MCNC: 8.7 G/DL — LOW (ref 11.5–15.5)
MAGNESIUM SERPL-MCNC: 2.1 MG/DL — SIGNIFICANT CHANGE UP (ref 1.6–2.6)
MCHC RBC-ENTMCNC: 28.5 PG — SIGNIFICANT CHANGE UP (ref 27–34)
MCHC RBC-ENTMCNC: 30.4 GM/DL — LOW (ref 32–36)
MCV RBC AUTO: 93.8 FL — SIGNIFICANT CHANGE UP (ref 80–100)
NRBC # BLD: 0 /100 WBCS — SIGNIFICANT CHANGE UP (ref 0–0)
PCO2 BLDV: 45 MMHG — HIGH (ref 39–42)
PH BLDV: 7.39 — SIGNIFICANT CHANGE UP (ref 7.32–7.43)
PHOSPHATE SERPL-MCNC: 3.2 MG/DL — SIGNIFICANT CHANGE UP (ref 2.5–4.5)
PLATELET # BLD AUTO: 653 K/UL — HIGH (ref 150–400)
PO2 BLDV: 44 MMHG — SIGNIFICANT CHANGE UP (ref 25–45)
POTASSIUM SERPL-MCNC: 3 MMOL/L — LOW (ref 3.5–5.3)
POTASSIUM SERPL-SCNC: 3 MMOL/L — LOW (ref 3.5–5.3)
PROT SERPL-MCNC: 7.6 G/DL — SIGNIFICANT CHANGE UP (ref 6–8.3)
RBC # BLD: 3.05 M/UL — LOW (ref 3.8–5.2)
RBC # FLD: 14.7 % — HIGH (ref 10.3–14.5)
SAO2 % BLDV: 74 % — SIGNIFICANT CHANGE UP (ref 67–88)
SODIUM SERPL-SCNC: 142 MMOL/L — SIGNIFICANT CHANGE UP (ref 135–145)
WBC # BLD: 14.41 K/UL — HIGH (ref 3.8–10.5)
WBC # FLD AUTO: 14.41 K/UL — HIGH (ref 3.8–10.5)

## 2022-07-18 PROCEDURE — 70450 CT HEAD/BRAIN W/O DYE: CPT | Mod: 26

## 2022-07-18 PROCEDURE — 70490 CT SOFT TISSUE NECK W/O DYE: CPT | Mod: 26

## 2022-07-18 PROCEDURE — 99221 1ST HOSP IP/OBS SF/LOW 40: CPT

## 2022-07-18 PROCEDURE — 99291 CRITICAL CARE FIRST HOUR: CPT

## 2022-07-18 RX ORDER — FUROSEMIDE 40 MG
20 TABLET ORAL ONCE
Refills: 0 | Status: COMPLETED | OUTPATIENT
Start: 2022-07-18 | End: 2022-07-18

## 2022-07-18 RX ORDER — HYDROMORPHONE HYDROCHLORIDE 2 MG/ML
1 INJECTION INTRAMUSCULAR; INTRAVENOUS; SUBCUTANEOUS EVERY 4 HOURS
Refills: 0 | Status: DISCONTINUED | OUTPATIENT
Start: 2022-07-18 | End: 2022-07-23

## 2022-07-18 RX ORDER — POTASSIUM CHLORIDE 20 MEQ
40 PACKET (EA) ORAL ONCE
Refills: 0 | Status: COMPLETED | OUTPATIENT
Start: 2022-07-18 | End: 2022-07-18

## 2022-07-18 RX ORDER — NYSTATIN 500MM UNIT
500000 POWDER (EA) MISCELLANEOUS DAILY
Refills: 0 | Status: DISCONTINUED | OUTPATIENT
Start: 2022-07-18 | End: 2022-07-23

## 2022-07-18 RX ORDER — DEXMEDETOMIDINE HYDROCHLORIDE IN 0.9% SODIUM CHLORIDE 4 UG/ML
0.2 INJECTION INTRAVENOUS
Qty: 200 | Refills: 0 | Status: DISCONTINUED | OUTPATIENT
Start: 2022-07-18 | End: 2022-07-18

## 2022-07-18 RX ORDER — HYDRALAZINE HCL 50 MG
10 TABLET ORAL THREE TIMES A DAY
Refills: 0 | Status: DISCONTINUED | OUTPATIENT
Start: 2022-07-18 | End: 2022-07-23

## 2022-07-18 RX ORDER — POTASSIUM CHLORIDE 20 MEQ
10 PACKET (EA) ORAL
Refills: 0 | Status: COMPLETED | OUTPATIENT
Start: 2022-07-18 | End: 2022-07-18

## 2022-07-18 RX ADMIN — Medication 10 MILLIGRAM(S): at 04:15

## 2022-07-18 RX ADMIN — HYDROMORPHONE HYDROCHLORIDE 1 MILLIGRAM(S): 2 INJECTION INTRAMUSCULAR; INTRAVENOUS; SUBCUTANEOUS at 13:35

## 2022-07-18 RX ADMIN — Medication 2: at 11:52

## 2022-07-18 RX ADMIN — Medication 10 MILLIGRAM(S): at 15:10

## 2022-07-18 RX ADMIN — HEPARIN SODIUM 7500 UNIT(S): 5000 INJECTION INTRAVENOUS; SUBCUTANEOUS at 06:00

## 2022-07-18 RX ADMIN — HYDROMORPHONE HYDROCHLORIDE 1 MILLIGRAM(S): 2 INJECTION INTRAMUSCULAR; INTRAVENOUS; SUBCUTANEOUS at 08:00

## 2022-07-18 RX ADMIN — HYDROMORPHONE HYDROCHLORIDE 1 MILLIGRAM(S): 2 INJECTION INTRAMUSCULAR; INTRAVENOUS; SUBCUTANEOUS at 19:33

## 2022-07-18 RX ADMIN — Medication 500000 UNIT(S): at 11:52

## 2022-07-18 RX ADMIN — HYDROMORPHONE HYDROCHLORIDE 1 MILLIGRAM(S): 2 INJECTION INTRAMUSCULAR; INTRAVENOUS; SUBCUTANEOUS at 02:59

## 2022-07-18 RX ADMIN — Medication 3 MILLILITER(S): at 17:20

## 2022-07-18 RX ADMIN — Medication 1 MILLIGRAM(S): at 00:11

## 2022-07-18 RX ADMIN — HYDROMORPHONE HYDROCHLORIDE 1 MILLIGRAM(S): 2 INJECTION INTRAMUSCULAR; INTRAVENOUS; SUBCUTANEOUS at 19:48

## 2022-07-18 RX ADMIN — Medication 100 MILLIEQUIVALENT(S): at 04:14

## 2022-07-18 RX ADMIN — Medication 1.25 MILLIGRAM(S): at 11:52

## 2022-07-18 RX ADMIN — HYDROMORPHONE HYDROCHLORIDE 1 MILLIGRAM(S): 2 INJECTION INTRAMUSCULAR; INTRAVENOUS; SUBCUTANEOUS at 07:43

## 2022-07-18 RX ADMIN — Medication 100 MILLIEQUIVALENT(S): at 03:13

## 2022-07-18 RX ADMIN — HYDROMORPHONE HYDROCHLORIDE 1 MILLIGRAM(S): 2 INJECTION INTRAMUSCULAR; INTRAVENOUS; SUBCUTANEOUS at 13:21

## 2022-07-18 RX ADMIN — Medication 10 MILLIGRAM(S): at 11:51

## 2022-07-18 RX ADMIN — Medication 20 MILLIGRAM(S): at 11:52

## 2022-07-18 RX ADMIN — Medication 4: at 17:09

## 2022-07-18 RX ADMIN — HEPARIN SODIUM 7500 UNIT(S): 5000 INJECTION INTRAVENOUS; SUBCUTANEOUS at 13:19

## 2022-07-18 RX ADMIN — PANTOPRAZOLE SODIUM 40 MILLIGRAM(S): 20 TABLET, DELAYED RELEASE ORAL at 11:51

## 2022-07-18 RX ADMIN — HYDROMORPHONE HYDROCHLORIDE 1 MILLIGRAM(S): 2 INJECTION INTRAMUSCULAR; INTRAVENOUS; SUBCUTANEOUS at 03:30

## 2022-07-18 RX ADMIN — Medication 100 MILLIEQUIVALENT(S): at 02:14

## 2022-07-18 RX ADMIN — Medication 10 MILLIGRAM(S): at 10:12

## 2022-07-18 RX ADMIN — HEPARIN SODIUM 7500 UNIT(S): 5000 INJECTION INTRAVENOUS; SUBCUTANEOUS at 22:14

## 2022-07-18 RX ADMIN — Medication 10 MILLIGRAM(S): at 17:09

## 2022-07-18 RX ADMIN — Medication 1 MILLIGRAM(S): at 10:50

## 2022-07-18 RX ADMIN — CHLORHEXIDINE GLUCONATE 1 APPLICATION(S): 213 SOLUTION TOPICAL at 06:00

## 2022-07-18 RX ADMIN — Medication 3 MILLILITER(S): at 05:53

## 2022-07-18 RX ADMIN — PIPERACILLIN AND TAZOBACTAM 25 GRAM(S): 4; .5 INJECTION, POWDER, LYOPHILIZED, FOR SOLUTION INTRAVENOUS at 15:52

## 2022-07-18 RX ADMIN — Medication 10 MILLIGRAM(S): at 06:01

## 2022-07-18 RX ADMIN — Medication 1.25 MILLIGRAM(S): at 17:09

## 2022-07-18 RX ADMIN — PIPERACILLIN AND TAZOBACTAM 25 GRAM(S): 4; .5 INJECTION, POWDER, LYOPHILIZED, FOR SOLUTION INTRAVENOUS at 07:35

## 2022-07-18 NOTE — SWALLOW BEDSIDE ASSESSMENT ADULT - SLP PERTINENT HISTORY OF CURRENT PROBLEM
52F c hx CRPS, MO, DM2, HTN, HTN, IBS, chronic back pain, pseudotumor cerebri, recent spinal nerve stimulator placed (7/8/22 @ [facility]), pw 2 days fevers, SOB, productive cough. Pt reports having the neurostimulator placed at [facility] as an outpt procedure under conscious sedation, was observed, discharged home. That night at home, pt started to fever, SOB, productive cough. Pt states her symptoms have progressively gotten worse since then. Pt states she is having orthopnea and also can't lay flat 2/2 recent spinal stimulator. Reports b/l pleuritic rib pain. Denies urinary symptoms, diarrhea, abd pain. Pt ambulates with walker at baseline.

## 2022-07-18 NOTE — CHART NOTE - NSCHARTNOTEFT_GEN_A_CORE
MICU DOWN GRADE NOTE    CHIEF COMPLAINT: SOb/fever/cough    HPI / INTERVAL HISTORY:  52F c hx CRPS, DM2, HTN, HTN, IBS, chronic back pain, pseudotumor cerebri, recent spinal nerve stimulator placed (7/8/22 @ [facility]), who presented on 7/10 with 2 days fevers, SOB, productive cough.    Pt reports having the neurostimulator placed at [facility] as an outpt procedure under conscious sedation, was observed, discharged home. That night at home, pt started to fever, SOB, productive cough. Pt states her symptoms have progressively gotten worse since then. Pt states she also can't lay flat 2/2 recent spinal stimulator. Reports b/l pleuritic chest pain when she coughs. Denies urinary symptoms, diarrhea, abd pain. Pt ambulates with walker at baseline. Pt was found to have significant multifocal pna on CT and admitted to medicine likely 2/2 ASP pna.    Hospital Course:  Since admission, pt SOB worsened on NRB requiring bipap. Pt fever also spiked to 104.1. MICU initially consulted for multifocal pna on Bipap, but at the time pt was not tachypnic. However, later pt became increasingly more tachypneic and reports feeling more tired on bipap 12/8/70% satting 89-90%. Pt was admitted to micu for hypoxic resp failure and inc WOB on bipap.   In the MICU, pt was subsequently intubated. Pt then developed severe ARDS 2/2 suspected asp pna. Bronch, bcx, ua neg. RVP was pos for HmnV. Spinal stimulator was removed. Pt was on max vent settings, placed on pressors for vasoplegia 2/2 sedation, and was paralyzed, proned, which improved her course and was subsequently weaned off pressors and extubated to bipap yesterday. Pt was hypertensive and agitated overnight. Pt's home BP meds were converted to IV and pt received ativan prn, which improved her HTN/agitation. Pt to finish course of zosyn this AM. This AM, pt found to have unequal pupils without focal deficits. CT head done was neg. Pt currently satting well on RA.     REVIEW OF SYSTEMS:  CONSTITUTIONAL: No fever, chills, but admits to regional, non specific pain and anxiety   HEENT:  No blurry vision No sinus or throat pain  NECK: No pain or stiffness  RESPIRATORY: No cough, wheezing, chills or hemoptysis; No shortness of breath  CARDIOVASCULAR: No chest pain, palpitations  GASTROINTESTINAL: No abdominal pain. No nausea, vomiting, or diarrhea  GENITOURINARY: No dysuria  NEUROLOGICAL: No HA, No focal weakness  SKIN: No itching, burning, rashes, or lesions   MUSCULOSKELETAL: No joint pain or swelling; No muscle, back, or extremity pain    MEDICATIONS:  albuterol/ipratropium for Nebulization 3 milliLiter(s) Nebulizer every 6 hours  chlorhexidine 4% Liquid 1 Application(s) Topical <User Schedule>  dexMEDEtomidine Infusion 0.2 MICROgram(s)/kG/Hr IV Continuous <Continuous>  enalaprilat Injectable 1.25 milliGRAM(s) IV Push every 6 hours  glucagon  Injectable 1 milliGRAM(s) IntraMuscular once  heparin   Injectable 7500 Unit(s) SubCutaneous every 8 hours  hydrALAZINE Injectable 10 milliGRAM(s) IV Push three times a day PRN  HYDROmorphone  Injectable 1 milliGRAM(s) IV Push every 4 hours PRN  insulin lispro (ADMELOG) corrective regimen sliding scale   SubCutaneous every 6 hours  insulin NPH human recombinant 30 Unit(s) SubCutaneous every 6 hours  labetalol Injectable 10 milliGRAM(s) IV Push every 6 hours  naloxegol 25 milliGRAM(s) Oral daily  nystatin    Suspension 993123 Unit(s) Swish and Swallow daily  pantoprazole  Injectable 40 milliGRAM(s) IV Push daily  piperacillin/tazobactam IVPB.. 3.375 Gram(s) IV Intermittent every 8 hours  polyethylene glycol 3350 17 Gram(s) Oral every 12 hours  senna Syrup 5 milliLiter(s) Oral at bedtime  sertraline 50 milliGRAM(s) Oral daily      T(C): 37 (07-18-22 @ 12:00), Max: 37 (07-17-22 @ 16:00)  HR: 95 (07-18-22 @ 13:00) (55 - 95)  BP: 165/74 (07-18-22 @ 13:00) (131/61 - 215/89)  RR: 12 (07-18-22 @ 13:00) (12 - 31)  SpO2: 98% (07-18-22 @ 10:30) (94% - 100%)  Wt(kg): --Vital Signs Last 24 Hrs  T(C): 37 (18 Jul 2022 12:00), Max: 37 (17 Jul 2022 16:00)  T(F): 98.6 (18 Jul 2022 12:00), Max: 98.6 (17 Jul 2022 16:00)  HR: 95 (18 Jul 2022 13:00) (55 - 95)  BP: 165/74 (18 Jul 2022 13:00) (131/61 - 215/89)  BP(mean): 107 (18 Jul 2022 13:00) (87 - 133)  RR: 12 (18 Jul 2022 13:00) (12 - 31)  SpO2: 98% (18 Jul 2022 10:30) (94% - 100%)    Parameters below as of 18 Jul 2022 08:39  Patient On (Oxygen Delivery Method): nasal cannula  O2 Flow (L/min): 3      PHYSICAL EXAM:  GENERAL: NAD, well-groomed, well-developed  HEAD:  Atraumatic, Normocephalic  EYES: EOMI, PERRLA, conjunctiva and sclera clear  ENMT:  Moist mucous membranes  NECK: Supple, No JVD,  CHEST/LUNG: Clear to auscultation bilaterally; No rales, rhonchi, wheezing, or rubs  HEART: Regular rate and rhythm; No murmurs, rubs, or gallops  ABDOMEN: Soft, Nontender, Nondistended; Bowel sounds present  NEURO: Alert & Oriented X3  EXTREMITIES: No LE edema, no calf tenderness  LYMPH: No lymphadenopathy noted  SKIN: No rashes or lesions    Consultant(s) Notes Reviewed:  [x ] YES  [ ] NO  Care Discussed with Consultants/Other Providers [ x] YES  [ ] NO    LABS:                        8.7    14.41 )-----------( 653      ( 18 Jul 2022 01:01 )             28.6     07-18    142  |  106  |  18  ----------------------------<  113<H>  3.0<L>   |  24  |  0.80    Ca    8.7      18 Jul 2022 01:01  Phos  3.2     07-18  Mg     2.1     07-18    TPro  7.6  /  Alb  3.2<L>  /  TBili  0.4  /  DBili  x   /  AST  38  /  ALT  40  /  AlkPhos  189<H>  07-18    PT/INR - ( 17 Jul 2022 00:19 )   PT: 13.4 sec;   INR: 1.16 ratio         PTT - ( 18 Jul 2022 01:01 )  PTT:26.7 sec    CAPILLARY BLOOD GLUCOSE      POCT Blood Glucose.: 197 mg/dL (18 Jul 2022 11:50)  POCT Blood Glucose.: 150 mg/dL (18 Jul 2022 05:59)  POCT Blood Glucose.: 111 mg/dL (18 Jul 2022 00:37)  POCT Blood Glucose.: 176 mg/dL (17 Jul 2022 16:34)            RADIOLOGY & ADDITIONAL TESTS:    Imaging Personally Reviewed:  [x ] YES  [ ] NO    Fauzia Linares is a 52 year old with  CRPS, MO, DM2, HTN, HTN, IBS, chronic back pain, pseudotumor cerebri, recent spinal nerve stimulator placed (7/8/22 @ [facility]) who initially presented with hypoxic respiratory failure, found to have multifocal pna 2/2 aspiration pneumonia now with increased work of breathing on bipap admitted to MICU. s/p intubation for hypoxic resp failure on bipap and with severe ARDS likely 2/2 Asp PNA. now extubated, off pressors, on room air and stable for downgrade.     to follow up:   [ ] f/u pain management recs for CRPS/chronic back pain  [ ] cont PRN ativan q8 while unable to tolerate PO sertraline   [ ] f/u FEES for swallow eval  [ ] cont to monitor new onset anisocoria w/neg CT head   [ ] bipap pqhs     case and plan discussed with Dr Clark MICU DOWN GRADE NOTE    CHIEF COMPLAINT: SOb/fever/cough    HPI / INTERVAL HISTORY:  52F c hx CRPS, DM2, HTN, HTN, IBS, chronic back pain, pseudotumor cerebri, recent spinal nerve stimulator placed (7/8/22 @ [facility]), who presented on 7/10 with 2 days fevers, SOB, productive cough.    Pt reports having the neurostimulator placed at [facility] as an outpt procedure under conscious sedation, was observed, discharged home. That night at home, pt started to fever, SOB, productive cough. Pt states her symptoms have progressively gotten worse since then. Pt states she also can't lay flat 2/2 recent spinal stimulator. Reports b/l pleuritic chest pain when she coughs. Denies urinary symptoms, diarrhea, abd pain. Pt ambulates with walker at baseline. Pt was found to have significant multifocal pna on CT and admitted to medicine likely 2/2 ASP pna.    Hospital Course:  Since admission, pt SOB worsened on NRB requiring bipap. Pt fever also spiked to 104.1. MICU initially consulted for multifocal pna on Bipap, but at the time pt was not tachypnic. However, later pt became increasingly more tachypneic and reports feeling more tired on bipap 12/8/70% satting 89-90%. Pt was admitted to micu for hypoxic resp failure and inc WOB on bipap.   In the MICU, pt was subsequently intubated. Pt then developed severe ARDS 2/2 suspected asp pna. Bronch, bcx, ua neg. RVP was pos for HmnV. Spinal stimulator was removed. Pt was on max vent settings, placed on pressors for vasoplegia 2/2 sedation, and was paralyzed, proned, which improved her course and was subsequently weaned off pressors and extubated to bipap yesterday. Pt was hypertensive and agitated overnight. Pt's home BP meds were converted to IV and pt received ativan prn, which improved her HTN/agitation. Pt to finish course of zosyn this AM. This AM, pt found to have unequal pupils without focal deficits. CT head done was neg. Pt currently satting well on RA.     REVIEW OF SYSTEMS:  CONSTITUTIONAL: No fever, chills, but admits to regional, non specific pain and anxiety   HEENT:  No blurry vision No sinus or throat pain  NECK: No pain or stiffness  RESPIRATORY: No cough, wheezing, chills or hemoptysis; No shortness of breath  CARDIOVASCULAR: No chest pain, palpitations  GASTROINTESTINAL: No abdominal pain. No nausea, vomiting, or diarrhea  GENITOURINARY: No dysuria  NEUROLOGICAL: No HA, No focal weakness  SKIN: No itching, burning, rashes, or lesions   MUSCULOSKELETAL: No joint pain or swelling; No muscle, back, or extremity pain    MEDICATIONS:  albuterol/ipratropium for Nebulization 3 milliLiter(s) Nebulizer every 6 hours  chlorhexidine 4% Liquid 1 Application(s) Topical <User Schedule>  dexMEDEtomidine Infusion 0.2 MICROgram(s)/kG/Hr IV Continuous <Continuous>  enalaprilat Injectable 1.25 milliGRAM(s) IV Push every 6 hours  glucagon  Injectable 1 milliGRAM(s) IntraMuscular once  heparin   Injectable 7500 Unit(s) SubCutaneous every 8 hours  hydrALAZINE Injectable 10 milliGRAM(s) IV Push three times a day PRN  HYDROmorphone  Injectable 1 milliGRAM(s) IV Push every 4 hours PRN  insulin lispro (ADMELOG) corrective regimen sliding scale   SubCutaneous every 6 hours  insulin NPH human recombinant 30 Unit(s) SubCutaneous every 6 hours  labetalol Injectable 10 milliGRAM(s) IV Push every 6 hours  naloxegol 25 milliGRAM(s) Oral daily  nystatin    Suspension 476925 Unit(s) Swish and Swallow daily  pantoprazole  Injectable 40 milliGRAM(s) IV Push daily  piperacillin/tazobactam IVPB.. 3.375 Gram(s) IV Intermittent every 8 hours  polyethylene glycol 3350 17 Gram(s) Oral every 12 hours  senna Syrup 5 milliLiter(s) Oral at bedtime  sertraline 50 milliGRAM(s) Oral daily      T(C): 37 (07-18-22 @ 12:00), Max: 37 (07-17-22 @ 16:00)  HR: 95 (07-18-22 @ 13:00) (55 - 95)  BP: 165/74 (07-18-22 @ 13:00) (131/61 - 215/89)  RR: 12 (07-18-22 @ 13:00) (12 - 31)  SpO2: 98% (07-18-22 @ 10:30) (94% - 100%)  Wt(kg): --Vital Signs Last 24 Hrs  T(C): 37 (18 Jul 2022 12:00), Max: 37 (17 Jul 2022 16:00)  T(F): 98.6 (18 Jul 2022 12:00), Max: 98.6 (17 Jul 2022 16:00)  HR: 95 (18 Jul 2022 13:00) (55 - 95)  BP: 165/74 (18 Jul 2022 13:00) (131/61 - 215/89)  BP(mean): 107 (18 Jul 2022 13:00) (87 - 133)  RR: 12 (18 Jul 2022 13:00) (12 - 31)  SpO2: 98% (18 Jul 2022 10:30) (94% - 100%)    Parameters below as of 18 Jul 2022 08:39  Patient On (Oxygen Delivery Method): nasal cannula  O2 Flow (L/min): 3      PHYSICAL EXAM:  GENERAL: NAD, well-groomed, well-developed  HEAD:  Atraumatic, Normocephalic  EYES: EOMI, PERRLA, conjunctiva and sclera clear  ENMT:  Moist mucous membranes  NECK: Supple, No JVD,  CHEST/LUNG: Clear to auscultation bilaterally; No rales, rhonchi, wheezing, or rubs  HEART: Regular rate and rhythm; No murmurs, rubs, or gallops  ABDOMEN: Soft, Nontender, Nondistended; Bowel sounds present  NEURO: Alert & Oriented X3  EXTREMITIES: No LE edema, no calf tenderness  LYMPH: No lymphadenopathy noted  SKIN: No rashes or lesions    Consultant(s) Notes Reviewed:  [x ] YES  [ ] NO  Care Discussed with Consultants/Other Providers [ x] YES  [ ] NO    LABS:                        8.7    14.41 )-----------( 653      ( 18 Jul 2022 01:01 )             28.6     07-18    142  |  106  |  18  ----------------------------<  113<H>  3.0<L>   |  24  |  0.80    Ca    8.7      18 Jul 2022 01:01  Phos  3.2     07-18  Mg     2.1     07-18    TPro  7.6  /  Alb  3.2<L>  /  TBili  0.4  /  DBili  x   /  AST  38  /  ALT  40  /  AlkPhos  189<H>  07-18    PT/INR - ( 17 Jul 2022 00:19 )   PT: 13.4 sec;   INR: 1.16 ratio         PTT - ( 18 Jul 2022 01:01 )  PTT:26.7 sec    CAPILLARY BLOOD GLUCOSE      POCT Blood Glucose.: 197 mg/dL (18 Jul 2022 11:50)  POCT Blood Glucose.: 150 mg/dL (18 Jul 2022 05:59)  POCT Blood Glucose.: 111 mg/dL (18 Jul 2022 00:37)  POCT Blood Glucose.: 176 mg/dL (17 Jul 2022 16:34)            RADIOLOGY & ADDITIONAL TESTS:    Imaging Personally Reviewed:  [x ] YES  [ ] NO    Fauzia Linares is a 52 year old with  CRPS, MO, DM2, HTN, HTN, IBS, chronic back pain, pseudotumor cerebri, recent spinal nerve stimulator placed (7/8/22 @ [facility]) who initially presented with hypoxic respiratory failure, found to have multifocal pna 2/2 aspiration pneumonia now with increased work of breathing on bipap admitted to MICU. s/p intubation for hypoxic resp failure on bipap and with severe ARDS likely 2/2 Asp PNA. now extubated, off pressors, on room air and stable for downgrade.     to follow up:   [ ] f/u pain management recs for CRPS/chronic back pain  [ ] cont PRN ativan q8 while unable to tolerate PO sertraline   [ ] f/u FEES for swallow eval  [ ] cont to monitor new onset anisocoria w/neg CT head   [ ] bipap pqhs     case and plan discussed with Dr Clark  accepting physician: Dr. Best Bautista MICU TRANSFER NOTE    CHIEF COMPLAINT: SOb/fever/cough    HPI / INTERVAL HISTORY:  52F c hx CRPS, DM2, HTN, HTN, IBS, chronic back pain, pseudotumor cerebri, recent spinal nerve stimulator placed (7/8/22 @ [facility]), who presented on 7/10 with 2 days fevers, SOB, productive cough.    Pt reports having the neurostimulator placed at [facility] as an outpt procedure under conscious sedation, was observed, discharged home. That night at home, pt started to fever, SOB, productive cough. Pt states her symptoms have progressively gotten worse since then. Pt states she also can't lay flat 2/2 recent spinal stimulator. Reports b/l pleuritic chest pain when she coughs. Denies urinary symptoms, diarrhea, abd pain. Pt ambulates with walker at baseline. Pt was found to have significant multifocal pna on CT and admitted to medicine likely 2/2 ASP pna.    Hospital Course:  Since admission, pt SOB worsened on NRB requiring bipap. Pt fever also spiked to 104.1. MICU initially consulted for multifocal pna on Bipap, but at the time pt was not tachypnic. However, later pt became increasingly more tachypneic and reports feeling more tired on bipap 12/8/70% satting 89-90%. Pt was admitted to micu for hypoxic resp failure and inc WOB on bipap.   In the MICU, pt was subsequently intubated. Pt then developed severe ARDS 2/2 suspected asp pna. Bronch, bcx, ua neg. RVP was pos for HmnV. Spinal stimulator was removed. Pt was on max vent settings, placed on pressors for vasoplegia 2/2 sedation, and was paralyzed, proned, which improved her course and was subsequently weaned off pressors and extubated to bipap yesterday. Pt was hypertensive and agitated overnight. Pt's home BP meds were converted to IV and pt received ativan prn, which improved her HTN/agitation. Pt to finish course of zosyn this AM. This AM, pt found to have unequal pupils without focal deficits. CT head done was neg. Pt currently satting well on RA.     REVIEW OF SYSTEMS:  CONSTITUTIONAL: No fever, chills, but admits to regional, non specific pain and anxiety   HEENT:  No blurry vision No sinus or throat pain  NECK: No pain or stiffness  RESPIRATORY: No cough, wheezing, chills or hemoptysis; No shortness of breath  CARDIOVASCULAR: No chest pain, palpitations  GASTROINTESTINAL: No abdominal pain. No nausea, vomiting, or diarrhea  GENITOURINARY: No dysuria  NEUROLOGICAL: No HA, No focal weakness  SKIN: No itching, burning, rashes, or lesions   MUSCULOSKELETAL: No joint pain or swelling; No muscle, back, or extremity pain    MEDICATIONS:  albuterol/ipratropium for Nebulization 3 milliLiter(s) Nebulizer every 6 hours  chlorhexidine 4% Liquid 1 Application(s) Topical <User Schedule>  dexMEDEtomidine Infusion 0.2 MICROgram(s)/kG/Hr IV Continuous <Continuous>  enalaprilat Injectable 1.25 milliGRAM(s) IV Push every 6 hours  glucagon  Injectable 1 milliGRAM(s) IntraMuscular once  heparin   Injectable 7500 Unit(s) SubCutaneous every 8 hours  hydrALAZINE Injectable 10 milliGRAM(s) IV Push three times a day PRN  HYDROmorphone  Injectable 1 milliGRAM(s) IV Push every 4 hours PRN  insulin lispro (ADMELOG) corrective regimen sliding scale   SubCutaneous every 6 hours  insulin NPH human recombinant 30 Unit(s) SubCutaneous every 6 hours  labetalol Injectable 10 milliGRAM(s) IV Push every 6 hours  naloxegol 25 milliGRAM(s) Oral daily  nystatin    Suspension 264850 Unit(s) Swish and Swallow daily  pantoprazole  Injectable 40 milliGRAM(s) IV Push daily  piperacillin/tazobactam IVPB.. 3.375 Gram(s) IV Intermittent every 8 hours  polyethylene glycol 3350 17 Gram(s) Oral every 12 hours  senna Syrup 5 milliLiter(s) Oral at bedtime  sertraline 50 milliGRAM(s) Oral daily      T(C): 37 (07-18-22 @ 12:00), Max: 37 (07-17-22 @ 16:00)  HR: 95 (07-18-22 @ 13:00) (55 - 95)  BP: 165/74 (07-18-22 @ 13:00) (131/61 - 215/89)  RR: 12 (07-18-22 @ 13:00) (12 - 31)  SpO2: 98% (07-18-22 @ 10:30) (94% - 100%)  Wt(kg): --Vital Signs Last 24 Hrs  T(C): 37 (18 Jul 2022 12:00), Max: 37 (17 Jul 2022 16:00)  T(F): 98.6 (18 Jul 2022 12:00), Max: 98.6 (17 Jul 2022 16:00)  HR: 95 (18 Jul 2022 13:00) (55 - 95)  BP: 165/74 (18 Jul 2022 13:00) (131/61 - 215/89)  BP(mean): 107 (18 Jul 2022 13:00) (87 - 133)  RR: 12 (18 Jul 2022 13:00) (12 - 31)  SpO2: 98% (18 Jul 2022 10:30) (94% - 100%)    Parameters below as of 18 Jul 2022 08:39  Patient On (Oxygen Delivery Method): nasal cannula  O2 Flow (L/min): 3      PHYSICAL EXAM:  GENERAL: NAD, well-groomed, well-developed  HEAD:  Atraumatic, Normocephalic  EYES: EOMI, PERRLA, conjunctiva and sclera clear  ENMT:  Moist mucous membranes  NECK: Supple, No JVD,  CHEST/LUNG: Clear to auscultation bilaterally; No rales, rhonchi, wheezing, or rubs  HEART: Regular rate and rhythm; No murmurs, rubs, or gallops  ABDOMEN: Soft, Nontender, Nondistended; Bowel sounds present  NEURO: Alert & Oriented X3  EXTREMITIES: No LE edema, no calf tenderness  LYMPH: No lymphadenopathy noted  SKIN: No rashes or lesions    Consultant(s) Notes Reviewed:  [x ] YES  [ ] NO  Care Discussed with Consultants/Other Providers [ x] YES  [ ] NO    LABS:                        8.7    14.41 )-----------( 653      ( 18 Jul 2022 01:01 )             28.6     07-18    142  |  106  |  18  ----------------------------<  113<H>  3.0<L>   |  24  |  0.80    Ca    8.7      18 Jul 2022 01:01  Phos  3.2     07-18  Mg     2.1     07-18    TPro  7.6  /  Alb  3.2<L>  /  TBili  0.4  /  DBili  x   /  AST  38  /  ALT  40  /  AlkPhos  189<H>  07-18    PT/INR - ( 17 Jul 2022 00:19 )   PT: 13.4 sec;   INR: 1.16 ratio         PTT - ( 18 Jul 2022 01:01 )  PTT:26.7 sec    CAPILLARY BLOOD GLUCOSE      POCT Blood Glucose.: 197 mg/dL (18 Jul 2022 11:50)  POCT Blood Glucose.: 150 mg/dL (18 Jul 2022 05:59)  POCT Blood Glucose.: 111 mg/dL (18 Jul 2022 00:37)  POCT Blood Glucose.: 176 mg/dL (17 Jul 2022 16:34)            RADIOLOGY & ADDITIONAL TESTS:    Imaging Personally Reviewed:  [x ] YES  [ ] NO    Fauzia Linares is a 52 year old with  CRPS, MO, DM2, HTN, HTN, IBS, chronic back pain, pseudotumor cerebri, recent spinal nerve stimulator placed (7/8/22 @ [facility]) who initially presented with hypoxic respiratory failure, found to have multifocal pna 2/2 aspiration pneumonia now with increased work of breathing on bipap admitted to MICU. s/p intubation for hypoxic resp failure on bipap and with severe ARDS likely 2/2 Asp PNA. now extubated, off pressors, on room air and stable for downgrade.     to follow up:   [ ] f/u pain management recs for CRPS/chronic back pain  [ ] cont PRN ativan q8 while unable to tolerate PO sertraline   [ ] f/u FEES for swallow eval  [ ] cont to monitor new onset anisocoria w/neg CT head   [ ] bipap pqhs     case and plan discussed with Dr Clark  accepting physician: Dr. Best Bautista

## 2022-07-18 NOTE — SWALLOW BEDSIDE ASSESSMENT ADULT - SWALLOW EVAL: DIAGNOSIS
Patient intubated 7/11-7/17 with ARDS 2/2 suspected aspiration PNA. +Hx of pseudotumor cerebri with spinal nerve stimulator which was removed while intubated. Patient presents with s/s of oropharyngeal dysphagia characterized by reduced oral grading, delayed oral transit time, delayed trigger of the swallow, and coughing/throat clearing post intake of pureed solids and thin liquids.

## 2022-07-18 NOTE — SWALLOW BEDSIDE ASSESSMENT ADULT - COMMENTS
Since admission, pt SOB worsened on NRB requiring bipap. Pt fever also spiked to 104.1. MICU initially consulted for multifocal pna on Bipap, but at the time pt was not tachypnic. However, this AM pt became increasingly more tachypneic and reports feeling more tired on bipap 12/8/70% satting 89-90%. MICU was reconsulted for increased work of breathing on bipap. Pt was intubated on 7/11, and was treated in the MICU with zosyn for aspiration PNA. Pt became febrile a few times, however bronchoalveolar lavage and blood cultures showed no growth. Pt was weaned off pressors and sedation and successfully trialled on CPAP, with extubation on 7/17. Additionally, her spinal cord stimulator was removed by neurosurgery team on 7/14.    **Swallow hx: patient unknown to this service

## 2022-07-18 NOTE — CHART NOTE - NSCHARTNOTEFT_GEN_A_CORE
MAR Accept Note  Transfer to: Floor  Accepting Attending Physician:  SUYAPA Bautista  Assigned Room:  Pending    Patient seen and examined.   Labs and data reviewed.   No findings precluding transfer of service.       HPI/MICU COURSE:   Please refer to MICU transfer note for full details. Briefly, this is a 52 year old woman with  CRPS, MO, DM2, HTN, HTN, IBS, chronic back pain, pseudotumor cerebri, recent spinal nerve stimulator placed found to have multifocal pna 2/2 aspiration admitted to the MICU requiring intubation with severe ARDS now extubated and pending transfer to floor.    FOR FOLLOW-UP:  [ ] f/u pain management recs for CRPS/chronic back pain  [ ] cont PRN ativan q8 while unable to tolerate PO sertraline   [ ] f/u FEES for swallow eval  [ ] cont to monitor new onset anisocoria w/neg CT head   [ ] bipap hs     Nitish Cassidy MD  PGY-3, Internal Medicine

## 2022-07-18 NOTE — SWALLOW BEDSIDE ASSESSMENT ADULT - ASR SWALLOW RECOMMEND DIAG
Recommend FEES to further assess swallow mechanism and determine candidacy for safe oral intake. Pt agreeable./FEES

## 2022-07-18 NOTE — CONSULT NOTE ADULT - SUBJECTIVE AND OBJECTIVE BOX
Chief Complaint:  Patient is a 52y old  Female who presents with a chief complaint of sob, fever, cough (18 Jul 2022 11:25)    HPI:  52F c hx CRPS, MO, DM2, HTN, HTN, IBS, chronic back pain, pseudotumor cerebri, recent spinal nerve stimulator placed (7/8/22 @ [facility]), pw 2 days fevers, SOB, productive cough. Pt reports having the neurostimulator placed at [facility] as an outpt procedure under conscious sedation, was observed, discharged home. That night at home, pt started to fever, SOB, productive cough. Pt states her symptoms have progressively gotten worse since then. Pt states she is having orthopnea and also can't lay flat 2/2 recent spinal stimulator. Reports b/l pleuritic rib pain. Denies urinary symptoms, diarrhea, abd pain. Pt ambulates with walker at baseline.  (10 Jul 2022 23:45)    Pt with Aspiration PNA/Sepsis/ARDS    Current out- patient pain regimen: Lyrica 150 mg BID, Percocet 10 mg BID    Out Patient Pain Management provider: Dr. Vinny Maldonado    [facility] Prescription Monitoring Program: Reference #[id number]    Opioid Risk Tool (ORT-OUD) Score: Low    Pain Score: 0/10    Pt seen lying in bed in MICU, extubated yesterday. Patient's chronic pain starts mid back and wraps around, Bilaterally to underneath breasts. Pt states excellent response to SCS trial ([place]). Trial leads recently extracted. Pain controlled but states she is "doped up". Understands that when stable/recovered she will see about SCS implant.      REVIEW OF SYSTEMS:  CONSTITUTIONAL: No fever, weight loss, fatigue, falls  NEURO: No headaches, memory loss, loss of strength, tremors, dizziness or blurred vision  CV: No chest pain, palpitations  GI: No abdominal pain, nausea, vomiting, diarrhea, constipation, incontinence  : No urinary incontinence/retention  MSK: No joint pain or swelling  SKIN: No itching, burning, rashes, or lesions   PSYCHIATRIC: No depression, anxiety, mood swings, or difficulty sleeping      PHYSICAL EXAM  GENERAL: Seen at bedside, NAD, well-groomed, obese, appears stated age, no signs of toxicity  NEURO: Alert & Oriented X3, Good concentration; Follows commands   HEENT: Head atraumatic, normocephalic; speech clear and fluent  RESP: O2 Sat 97% on monitor  CV: Regular rate and rhythm on cardiac monitor  GI: NPO at present; (+)BM  : Female catheter to wall suction with dark urine  EXTREMITIES: 2+ Peripheral Pulses; moving all extremities; ambulates w/rollator and cane  SKIN: No rashes or lesions, (+) tattoos B/L UE  PSYCH: affect normal; good eye contact; no signs of depression or anxiety    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;intercostal neuralgia&quot;)      Cardiac abnormality  (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;cardiac event&quot;; is unclear on diagnosis; denies hx/o MI)      Diabetes      Essential hypertension      IBS (irritable bowel syndrome)      Complex regional pain syndrome type 1      History of cholecystectomy      History of hand surgery  (pt states she had 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12)          FAMILY HISTORY:  No pertinent family history in first degree relatives    No pertinent family history in first degree relatives        Allergies    amitriptyline (Other)      MEDICATIONS  (STANDING):  albuterol/ipratropium for Nebulization 3 milliLiter(s) Nebulizer every 6 hours  chlorhexidine 4% Liquid 1 Application(s) Topical <User Schedule>  enalaprilat Injectable 1.25 milliGRAM(s) IV Push every 6 hours  glucagon  Injectable 1 milliGRAM(s) IntraMuscular once  heparin   Injectable 7500 Unit(s) SubCutaneous every 8 hours  insulin lispro (ADMELOG) corrective regimen sliding scale   SubCutaneous every 6 hours  insulin NPH human recombinant 30 Unit(s) SubCutaneous every 6 hours  labetalol Injectable 10 milliGRAM(s) IV Push every 6 hours  naloxegol 25 milliGRAM(s) Oral daily  nystatin    Suspension 206145 Unit(s) Swish and Swallow daily  pantoprazole  Injectable 40 milliGRAM(s) IV Push daily  polyethylene glycol 3350 17 Gram(s) Oral every 12 hours  senna Syrup 5 milliLiter(s) Oral at bedtime  sertraline 50 milliGRAM(s) Oral daily    MEDICATIONS  (PRN):  hydrALAZINE Injectable 10 milliGRAM(s) IV Push three times a day PRN Hypertension  HYDROmorphone  Injectable 1 milliGRAM(s) IV Push every 4 hours PRN Severe Pain (7 - 10)      Vital Signs:  T(C): 36.7 (07-18-22 @ 16:00)  HR: 83 (07-18-22 @ 17:23)  BP: 148/64 (07-18-22 @ 17:00)  RR: 24 (07-18-22 @ 17:00)  SpO2: 95% (07-18-22 @ 17:23)    Pertinent labs/radiology:  Reviewed                          8.7    14.41 )-----------( 653      ( 18 Jul 2022 01:01 )             28.6       07-18    142  |  106  |  18  ----------------------------<  113<H>  3.0<L>   |  24  |  0.80    Ca    8.7      18 Jul 2022 01:01  Phos  3.2     07-18  Mg     2.1     07-18    TPro  7.6  /  Alb  3.2<L>  /  TBili  0.4  /  DBili  x   /  AST  38  /  ALT  40  /  AlkPhos  189<H>  07-18

## 2022-07-18 NOTE — CONSULT NOTE ADULT - ASSESSMENT
52F c hx CRPS, MO, DM2, HTN, HTN, IBS, chronic back pain, pseudotumor cerebri, recent spinal nerve stimulator placed (7/8/22 @ [facility]), pw 2 days fevers, SOB, productive cough. Pt reports having the neurostimulator placed at [facility] as an outpt procedure under conscious sedation, was observed, discharged home. That night at home, pt started to fever, SOB, productive cough. Pt states her symptoms have progressively gotten worse since then. Pt states she is having orthopnea and also can't lay flat 2/2 recent spinal stimulator. Reports b/l pleuritic rib pain. Denies urinary symptoms, diarrhea, abd pain. Pt ambulates with walker at baseline.  (10 Jul 2022 23:45)    Pt with Aspiration PNA/Sepsis/ARDS; extubated yesterday    Current out- patient pain regimen: Lyrica 150 mg BID, Percocet 10 mg BID  Out Patient Pain Management provider: Dr. Vinny Maldonado    Patient's chronic pain starts mid back and wraps around, Bilaterally to underneath breasts. Pt states excellent response to SCS trial ([employer]). Trial leads recently extracted. Pain controlled but states she is "doped up". Understands that when stable/recovered she will see about SCS implant.      Plan discussed with primary team, when tolerating PO meds:  Start Lyrica 100 mg every 8 hours (CrCl = 219.1)  Start Oxycodone 10 mg every 4 hours PRN moderate pain and 15 mg every 4 hours PRN severe pain  Warm/cool packs for comfort  Bowel Regimen  Incentive Spirometer per primary team  PT per primary team  Monitor for sedation, respiratory depression  Narcan Rescue Kit on discharge (Naloxone 4 mg/0.1 ml nasal spray - 1 spray q 2-3 minutes alternating between nostrils)    Time spent on encounter:   30     Minutes    Chronic Pain Service  [phone]

## 2022-07-18 NOTE — PROGRESS NOTE ADULT - ASSESSMENT
Fauzia Linares is a 52 year old with  CRPS, MO, DM2, HTN, HTN, IBS, chronic back pain, pseudotumor cerebri, recent spinal nerve stimulator placed (7/8/22 @ [facility]) who initially presented with hypoxic respiratory failure, found to have multifocal pna 2/2 aspiration pneumonia now with increased work of breathing on bipap admitted to MICU. Now s/p intubated for hypoxic resp failure on bipap and with severe ARDS likely 2/2 Asp PNA    #Neuro  Pt extubated on 7/17   //s/p spinal stimulator w/ chronic back pain, CRPS. Spinal cord stimulator extracted on 7/14  - pt 's dilaudid dose increased to 1  - cont home sertraline     #Cardiovascular  //Sepsis, vasoplegia 2/2 sedation  - restarted precedex and added ativan 1 once for agitation, monitor for bradycardia   - Restarted home nifedipineXL,lisinopril, lasix   - Added cardine drip and labetalol for better pressure control, as pt has b lines from flash    #Respiratory  //Severe ARDS likely 2/2 bilat asp pna   - CT demonstrating significant bilat pna   - pt on Bipap, anxious  - VBG pH 7.43, pCO2 38, pO2 40, HCO3 25  - B lines seen on POCUS on 7/17, in setting of hypertension, restarted BP meds and added cardine and labetalol    #GI/Nutrition  //hx IBS   - NPO diet for now, until swallow eval  - cont home protonix   - bowel regimen and movantik  - will hold home bentyl, simethicone, loperamide w/ fentanyl gtt on     #/Renal  - Crn stable (0.73)  - making good UO    #ID  //Asp Pna, persistent fevers   -  pt remains afebrile   - cont tylenol q6 PRN, LFTS mildly elevated, will cont to monitor   - cont zosyn for 7 day course for asp pna (should finish 7/18). completed course of 3 days of Zithro.   - bcx negx2, sputum cx neg, UA negative   - RVP positive for hMPV     #Endocrine  //T2DM  -  FS in low 200's   - goal BG<180  - increased NPH to 30 units q6hr    #Hematologic/DVT ppx  - c/w heparin 7500 q8 subQ for DVT ppx    #Ethics  full code Fauzia Linares is a 52 year old with  CRPS, MO, DM2, HTN, HTN, IBS, chronic back pain, pseudotumor cerebri, recent spinal nerve stimulator placed (7/8/22 @ [facility]) who initially presented with hypoxic respiratory failure, found to have multifocal pna 2/2 aspiration pneumonia now with increased work of breathing on bipap admitted to MICU. s/p intubation for hypoxic resp failure on bipap and with severe ARDS likely 2/2 Asp PNA. now extubated.     #Neuro  Pt extubated on 7/17   //s/p spinal stimulator w/ chronic back pain, CRPS. Spinal cord stimulator extracted on 7/14  - pt 's dilaudid dose increased to 1mg q4  - sertraline on hold as pt is npo   - ativan prn for acute anxiety  - f/u pain management recs  //aniscoria   - no focal deficits   - f/u CT head read     #Cardiovascular  //Sepsis, vasoplegia 2/2 sedation  - htn to 170s s/p cardene gtt   - lasix 20mg IV (for home 40mg po), vasotec 1.25IV (for home lisinopril), labetolol 10mg q6 IV, hydralazine 10 prn TID for HTN  - home nifedipineXL,lisinopril, lasix on hold as npo      #Respiratory  //S/p ARDS likely 2/2 bilat asp pna   - CT demonstrating significant bilat pna   - Bipap ON s/p extubation  - now satting adequately on RA       #GI/Nutrition  //hx IBS   - NPO diet for now  - f/u TEES [place] as per speech and swallow   - cont home protonix   - bowel regimen and movantik  - will hold home bentyl, simethicone, loperamide while recieving opioids for pain      #/Renal  - Crn stable (0.73)  - making good UO    #ID  //Asp Pna, persistent fevers   -  pt remains afebrile   - cont tylenol q6 PRN if respikes fever, LFTS mildly elevated, will cont to monitor   - zosyn for 7 day course for asp pna to end today . completed course of 3 days of Zithro.   - bcx negx2, sputum cx neg, UA negative   - RVP positive for hMPV, now off contact   -nystatin swish for oral thrush     #Endocrine  //T2DM  -  FS in low 200's   - goal BG<180  - increased NPH to 30 units q6hr     #Hematologic/DVT ppx  - c/w heparin 7500 q8 subQ for DVT ppx    #Ethics  full code  pt no longer has any icu needs and is stable for downgrade

## 2022-07-18 NOTE — PROGRESS NOTE ADULT - NUTRITIONAL ASSESSMENT
This patient has been assessed with a concern for Malnutrition and has been determined to have a diagnosis/diagnoses of Morbid obesity (BMI > 40).    This patient is being managed with:   Diet NPO-  Entered: Jul 17 2022  9:16AM

## 2022-07-18 NOTE — SWALLOW BEDSIDE ASSESSMENT ADULT - SWALLOW EVAL: PATIENT/FAMILY GOALS STATEMENT
Pt denied hx of dysphagia. Endorsed weak voice at this time, but improved since extubation. Reported feeling anxious 2/2 hospitalization.

## 2022-07-18 NOTE — SWALLOW BEDSIDE ASSESSMENT ADULT - SLP GENERAL OBSERVATIONS
Patient encountered awake and alert, positioned upright in bed, on RA, SpO2 %, A&Ox4. +Intermittent moaning -> denied pain but c/o anxiety 2/2 current medical status. Denied difficulty breathing. Vocal quality mildly hoarse. Able to follow commands and make wants/needs known. Patient encountered awake and alert, positioned upright in bed, +3L/NC, SpO2 %, A&Ox4. +Intermittent moaning -> denied pain but c/o anxiety 2/2 current medical status. Denied difficulty breathing. Vocal quality mildly hoarse. Able to follow commands and make wants/needs known.

## 2022-07-18 NOTE — SWALLOW BEDSIDE ASSESSMENT ADULT - PHARYNGEAL PHASE
Delayed pharyngeal swallow/Cough post oral intake/Throat clear post oral intake Delayed pharyngeal swallow

## 2022-07-18 NOTE — PROGRESS NOTE ADULT - CRITICAL CARE ATTENDING COMMENT
Patient seen and examined. Patient critically ill requiring frequent bedside visits with therapy change. Patient is a 53 y/o morbidly obese F w/ extensive medical history including pseudotumor cerebri and chronic back pain s/p recent placement of spinal nerve stimulator prior to admission now admitted for acute hypoxemic respiratory failure secondary to likely bacterial PNA and hMPV infection. JUDD likely secondary to ATN.    1. Acute Hypoxemic and Hypercapnics Respiratory Failure - s/p intubation for bacterial pneumonia and hMPV infection   - extubated 7/17 and doing well with NIPPV as needed. Would continue NIPPV QHS and PRN for now and monitor serum bicarb  - Maintain O2 sat > 90%  - Aspiration precautions  - Previously proned for ARDS during this stay  2. Gastropharyngeal - oropharyngeal dysphagia s/p intubation. Swallow evaluation with FEES now planned for tomorrow  - continue NPO for now. Patient unable to tolerate attempt at NGT  3. Infectious Disease - continue Zosyn for now and followup cultures  - Spinal stimulator leads removed during this stay  - hMPV now off isolation. continue supportive care  - wound care eval of cheek lesion - likely related to proning  4. Renal - renal function normal and patient making urine  5. Cardiovascular - shock state resolved and now hypertensive  - Adjustment of IV antihypertensives until PO regimen can be used - either after passes FEES or allows NGT  - Will give a dose of Enalaprilat IV today as patient on Lisinopril at home  6. Chronic Pain - will ask for pain evaluation - as patient managed on outside PO Percocet. Use IV medications for now until has PO access  - anxiety is also an issue - currently requiring Precedex for control and has been given IV benzo overnight with some improvement  - needs more consistent outpatient pain control  - Check CTH to ensure no acute changes - as patient has anisocoria this AM  - Neurosurgery followup noted  7. Neck mass - patient has a soft tissue swelling of L neck noted this AM - unclear if new or old. Nontender  - Will check CT neck and soft tissue evaluation for further assessment  8. Proph - DVT proph  - Aspiration precautions    Prognosis guarded. Patient now extubated and requiring PRecedex for anxiety. Will titrate this off as able.

## 2022-07-18 NOTE — SWALLOW BEDSIDE ASSESSMENT ADULT - MODE OF PRESENTATION
Chief Complaint   Patient presents with     Teledermatology     Teledermatology with photo review.        There were no vitals taken for this visit.    Cary Todd CMA  June 9, 2020   spoon/straw/fed by clinician cup/straw/fed by clinician spoon/fed by clinician

## 2022-07-18 NOTE — PROGRESS NOTE ADULT - SUBJECTIVE AND OBJECTIVE BOX
INTERVAL HPI/OVERNIGHT EVENTS:    SUBJECTIVE: Patient seen and examined at bedside.       VITAL SIGNS:  ICU Vital Signs Last 24 Hrs  T(C): 36.7 (18 Jul 2022 08:00), Max: 37 (17 Jul 2022 16:00)  T(F): 98.1 (18 Jul 2022 08:00), Max: 98.6 (17 Jul 2022 16:00)  HR: 95 (18 Jul 2022 10:30) (55 - 95)  BP: 171/80 (18 Jul 2022 10:30) (131/61 - 215/89)  BP(mean): 115 (18 Jul 2022 10:30) (87 - 133)  ABP: --  ABP(mean): --  RR: 28 (18 Jul 2022 10:30) (16 - 33)  SpO2: 98% (18 Jul 2022 10:30) (92% - 100%)    O2 Parameters below as of 18 Jul 2022 08:39  Patient On (Oxygen Delivery Method): nasal cannula  O2 Flow (L/min): 3          Plateau pressure:   P/F ratio:     07-17 @ 07:01  -  07-18 @ 07:00  --------------------------------------------------------  IN: 498.4 mL / OUT: 3145 mL / NET: -2646.6 mL    07-18 @ 07:01 - 07-18 @ 11:25  --------------------------------------------------------  IN: 100 mL / OUT: 230 mL / NET: -130 mL      CAPILLARY BLOOD GLUCOSE      POCT Blood Glucose.: 150 mg/dL (18 Jul 2022 05:59)      ECG:    PHYSICAL EXAM:    General: sedated and intubated  HEENT:   Neck:   Respiratory:   Cardiovascular:   Abdomen:   Extremities:  Neurological:    MEDICATIONS:  MEDICATIONS  (STANDING):  albuterol/ipratropium for Nebulization 3 milliLiter(s) Nebulizer every 6 hours  chlorhexidine 4% Liquid 1 Application(s) Topical <User Schedule>  dexMEDEtomidine Infusion 0.2 MICROgram(s)/kG/Hr (8.46 mL/Hr) IV Continuous <Continuous>  enalaprilat Injectable 1.25 milliGRAM(s) IV Push every 6 hours  furosemide   Injectable 20 milliGRAM(s) IV Push once  glucagon  Injectable 1 milliGRAM(s) IntraMuscular once  heparin   Injectable 7500 Unit(s) SubCutaneous every 8 hours  insulin lispro (ADMELOG) corrective regimen sliding scale   SubCutaneous every 6 hours  insulin NPH human recombinant 30 Unit(s) SubCutaneous every 6 hours  labetalol Injectable 10 milliGRAM(s) IV Push every 6 hours  naloxegol 25 milliGRAM(s) Oral daily  nystatin    Suspension 999642 Unit(s) Swish and Swallow daily  pantoprazole  Injectable 40 milliGRAM(s) IV Push daily  piperacillin/tazobactam IVPB.. 3.375 Gram(s) IV Intermittent every 8 hours  polyethylene glycol 3350 17 Gram(s) Oral every 12 hours  senna Syrup 5 milliLiter(s) Oral at bedtime  sertraline 50 milliGRAM(s) Oral daily    MEDICATIONS  (PRN):  hydrALAZINE Injectable 10 milliGRAM(s) IV Push three times a day PRN Hypertension  HYDROmorphone  Injectable 1 milliGRAM(s) IV Push every 4 hours PRN Severe Pain (7 - 10)      ALLERGIES:  Allergies    amitriptyline (Other)    Intolerances        LABS:                        8.7    14.41 )-----------( 653      ( 18 Jul 2022 01:01 )             28.6     07-18    142  |  106  |  18  ----------------------------<  113<H>  3.0<L>   |  24  |  0.80    Ca    8.7      18 Jul 2022 01:01  Phos  3.2     07-18  Mg     2.1     07-18    TPro  7.6  /  Alb  3.2<L>  /  TBili  0.4  /  DBili  x   /  AST  38  /  ALT  40  /  AlkPhos  189<H>  07-18    PT/INR - ( 17 Jul 2022 00:19 )   PT: 13.4 sec;   INR: 1.16 ratio         PTT - ( 18 Jul 2022 01:01 )  PTT:26.7 sec    ABG:      vBG:  pH, Venous: 7.39 (07-18-22 @ 00:51)  pCO2, Venous: 45 mmHg (07-18-22 @ 00:51)  pO2, Venous: 44 mmHg (07-18-22 @ 00:51)  HCO3, Venous: 27 mmol/L (07-18-22 @ 00:51)  pH, Venous: 7.43 (07-17-22 @ 16:16)  pCO2, Venous: 41 mmHg (07-17-22 @ 16:16)  pO2, Venous: 54 mmHg (07-17-22 @ 16:16)  HCO3, Venous: 27 mmol/L (07-17-22 @ 16:16)    Micro:    Culture - Blood (collected 07-14-22 @ 11:45)  Source: .Blood Blood-Peripheral  Preliminary Report (07-15-22 @ 18:02):    No growth to date.    Culture - Blood (collected 07-14-22 @ 11:10)  Source: .Blood Blood-Peripheral  Preliminary Report (07-15-22 @ 18:02):    No growth to date.    Culture - Blood (collected 07-12-22 @ 02:29)  Source: .Blood Blood-Peripheral  Final Report (07-17-22 @ 09:01):    No Growth Final    Culture - Blood (collected 07-12-22 @ 02:29)  Source: .Blood Blood-Peripheral  Final Report (07-17-22 @ 09:01):    No Growth Final    Culture - Blood (collected 07-10-22 @ 14:35)  Source: .Blood Blood-Peripheral  Final Report (07-15-22 @ 23:01):    No Growth Final    Culture - Blood (collected 07-10-22 @ 14:20)  Source: .Blood Blood-Peripheral  Final Report (07-15-22 @ 23:01):    No Growth Final        Culture - Sputum (collected 07-14-22 @ 12:05)  Source: ET Tube ET Tube cup  Gram Stain (07-14-22 @ 20:06):    No polymorphonuclear leukocytes per low power field    No Squamous epithelial cells per low power field    Rare Yeast like cells per oil power field  Final Report (07-16-22 @ 16:42):    Normal Respiratory Brittany present    Culture - Sputum (collected 07-11-22 @ 20:36)  Source: .Sputum Sputum  Gram Stain (07-12-22 @ 06:41):    Rare polymorphonuclear leukocytes per low power field    No Squamous epithelial cells per low power field    No organisms seen per oil power field  Final Report (07-13-22 @ 16:42):    Normal Respiratory Brittany present        RADIOLOGY & ADDITIONAL TESTS: Reviewed.   INTERVAL HPI/OVERNIGHT EVENTS:  - agitated/htn overnight requiring ativan, precedex. no other acute events.   SUBJECTIVE: Patient seen and examined at bedside.   Pt admits to generalized, nonspecific 10/10 pain. pt also states she's severely anxious about her health. Pt denies cp, sob, abd pain, n/v.    VITAL SIGNS:  ICU Vital Signs Last 24 Hrs  T(C): 36.7 (18 Jul 2022 08:00), Max: 37 (17 Jul 2022 16:00)  T(F): 98.1 (18 Jul 2022 08:00), Max: 98.6 (17 Jul 2022 16:00)  HR: 95 (18 Jul 2022 10:30) (55 - 95)  BP: 171/80 (18 Jul 2022 10:30) (131/61 - 215/89)  BP(mean): 115 (18 Jul 2022 10:30) (87 - 133)  ABP: --  ABP(mean): --  RR: 28 (18 Jul 2022 10:30) (16 - 33)  SpO2: 98% (18 Jul 2022 10:30) (92% - 100%)    O2 Parameters below as of 18 Jul 2022 08:39  Patient On (Oxygen Delivery Method): nasal cannula  O2 Flow (L/min): 3          Plateau pressure:   P/F ratio:     07-17 @ 07:01  -  07-18 @ 07:00  --------------------------------------------------------  IN: 498.4 mL / OUT: 3145 mL / NET: -2646.6 mL    07-18 @ 07:01  -  07-18 @ 11:25  --------------------------------------------------------  IN: 100 mL / OUT: 230 mL / NET: -130 mL      CAPILLARY BLOOD GLUCOSE      POCT Blood Glucose.: 150 mg/dL (18 Jul 2022 05:59)      ECG:    PHYSICAL EXAM:    General: laying in bed, figidty, visibily anxious   HEENT: atraumatic, normocephalic, with new unequal pupils R>L, EOMI  Neck: no thyromegaly, no JVD, no tender lymphadenopathy   Respiratory: clear lung fields bilaterally, no wheezes, rales, or rhonchi  Cardiovascular: regular rate and rhythm, no murmurs, rubs, or gallops   Abdomen: soft, non-tender, non-distended, normoactive bowel sound  Extremities: warm, well perfused, 1+pedal edema   Neurological: a/o x3, following commands, bilat strength 5/5 equal in UE and LE with no facial droop or tongue deviation     MEDICATIONS:  MEDICATIONS  (STANDING):  albuterol/ipratropium for Nebulization 3 milliLiter(s) Nebulizer every 6 hours  chlorhexidine 4% Liquid 1 Application(s) Topical <User Schedule>  dexMEDEtomidine Infusion 0.2 MICROgram(s)/kG/Hr (8.46 mL/Hr) IV Continuous <Continuous>  enalaprilat Injectable 1.25 milliGRAM(s) IV Push every 6 hours  furosemide   Injectable 20 milliGRAM(s) IV Push once  glucagon  Injectable 1 milliGRAM(s) IntraMuscular once  heparin   Injectable 7500 Unit(s) SubCutaneous every 8 hours  insulin lispro (ADMELOG) corrective regimen sliding scale   SubCutaneous every 6 hours  insulin NPH human recombinant 30 Unit(s) SubCutaneous every 6 hours  labetalol Injectable 10 milliGRAM(s) IV Push every 6 hours  naloxegol 25 milliGRAM(s) Oral daily  nystatin    Suspension 002562 Unit(s) Swish and Swallow daily  pantoprazole  Injectable 40 milliGRAM(s) IV Push daily  piperacillin/tazobactam IVPB.. 3.375 Gram(s) IV Intermittent every 8 hours  polyethylene glycol 3350 17 Gram(s) Oral every 12 hours  senna Syrup 5 milliLiter(s) Oral at bedtime  sertraline 50 milliGRAM(s) Oral daily    MEDICATIONS  (PRN):  hydrALAZINE Injectable 10 milliGRAM(s) IV Push three times a day PRN Hypertension  HYDROmorphone  Injectable 1 milliGRAM(s) IV Push every 4 hours PRN Severe Pain (7 - 10)      ALLERGIES:  Allergies    amitriptyline (Other)    Intolerances        LABS:                        8.7    14.41 )-----------( 653      ( 18 Jul 2022 01:01 )             28.6     07-18    142  |  106  |  18  ----------------------------<  113<H>  3.0<L>   |  24  |  0.80    Ca    8.7      18 Jul 2022 01:01  Phos  3.2     07-18  Mg     2.1     07-18    TPro  7.6  /  Alb  3.2<L>  /  TBili  0.4  /  DBili  x   /  AST  38  /  ALT  40  /  AlkPhos  189<H>  07-18    PT/INR - ( 17 Jul 2022 00:19 )   PT: 13.4 sec;   INR: 1.16 ratio         PTT - ( 18 Jul 2022 01:01 )  PTT:26.7 sec    ABG:      vBG:  pH, Venous: 7.39 (07-18-22 @ 00:51)  pCO2, Venous: 45 mmHg (07-18-22 @ 00:51)  pO2, Venous: 44 mmHg (07-18-22 @ 00:51)  HCO3, Venous: 27 mmol/L (07-18-22 @ 00:51)  pH, Venous: 7.43 (07-17-22 @ 16:16)  pCO2, Venous: 41 mmHg (07-17-22 @ 16:16)  pO2, Venous: 54 mmHg (07-17-22 @ 16:16)  HCO3, Venous: 27 mmol/L (07-17-22 @ 16:16)    Micro:    Culture - Blood (collected 07-14-22 @ 11:45)  Source: .Blood Blood-Peripheral  Preliminary Report (07-15-22 @ 18:02):    No growth to date.    Culture - Blood (collected 07-14-22 @ 11:10)  Source: .Blood Blood-Peripheral  Preliminary Report (07-15-22 @ 18:02):    No growth to date.    Culture - Blood (collected 07-12-22 @ 02:29)  Source: .Blood Blood-Peripheral  Final Report (07-17-22 @ 09:01):    No Growth Final    Culture - Blood (collected 07-12-22 @ 02:29)  Source: .Blood Blood-Peripheral  Final Report (07-17-22 @ 09:01):    No Growth Final    Culture - Blood (collected 07-10-22 @ 14:35)  Source: .Blood Blood-Peripheral  Final Report (07-15-22 @ 23:01):    No Growth Final    Culture - Blood (collected 07-10-22 @ 14:20)  Source: .Blood Blood-Peripheral  Final Report (07-15-22 @ 23:01):    No Growth Final        Culture - Sputum (collected 07-14-22 @ 12:05)  Source: ET Tube ET Tube cup  Gram Stain (07-14-22 @ 20:06):    No polymorphonuclear leukocytes per low power field    No Squamous epithelial cells per low power field    Rare Yeast like cells per oil power field  Final Report (07-16-22 @ 16:42):    Normal Respiratory Brittany present    Culture - Sputum (collected 07-11-22 @ 20:36)  Source: .Sputum Sputum  Gram Stain (07-12-22 @ 06:41):    Rare polymorphonuclear leukocytes per low power field    No Squamous epithelial cells per low power field    No organisms seen per oil power field  Final Report (07-13-22 @ 16:42):    Normal Respiratory Brittany present        RADIOLOGY & ADDITIONAL TESTS: Reviewed.

## 2022-07-19 DIAGNOSIS — Z29.9 ENCOUNTER FOR PROPHYLACTIC MEASURES, UNSPECIFIED: ICD-10-CM

## 2022-07-19 LAB
ANION GAP SERPL CALC-SCNC: 13 MMOL/L — SIGNIFICANT CHANGE UP (ref 5–17)
BUN SERPL-MCNC: 14 MG/DL — SIGNIFICANT CHANGE UP (ref 7–23)
CALCIUM SERPL-MCNC: 8.8 MG/DL — SIGNIFICANT CHANGE UP (ref 8.4–10.5)
CHLORIDE SERPL-SCNC: 105 MMOL/L — SIGNIFICANT CHANGE UP (ref 96–108)
CO2 SERPL-SCNC: 24 MMOL/L — SIGNIFICANT CHANGE UP (ref 22–31)
CREAT SERPL-MCNC: 0.65 MG/DL — SIGNIFICANT CHANGE UP (ref 0.5–1.3)
CULTURE RESULTS: SIGNIFICANT CHANGE UP
CULTURE RESULTS: SIGNIFICANT CHANGE UP
EGFR: 106 ML/MIN/1.73M2 — SIGNIFICANT CHANGE UP
GLUCOSE BLDC GLUCOMTR-MCNC: 181 MG/DL — HIGH (ref 70–99)
GLUCOSE BLDC GLUCOMTR-MCNC: 198 MG/DL — HIGH (ref 70–99)
GLUCOSE BLDC GLUCOMTR-MCNC: 213 MG/DL — HIGH (ref 70–99)
GLUCOSE BLDC GLUCOMTR-MCNC: 247 MG/DL — HIGH (ref 70–99)
GLUCOSE SERPL-MCNC: 192 MG/DL — HIGH (ref 70–99)
HCT VFR BLD CALC: 33.5 % — LOW (ref 34.5–45)
HGB BLD-MCNC: 10 G/DL — LOW (ref 11.5–15.5)
MAGNESIUM SERPL-MCNC: 2.3 MG/DL — SIGNIFICANT CHANGE UP (ref 1.6–2.6)
MCHC RBC-ENTMCNC: 28 PG — SIGNIFICANT CHANGE UP (ref 27–34)
MCHC RBC-ENTMCNC: 29.9 GM/DL — LOW (ref 32–36)
MCV RBC AUTO: 93.8 FL — SIGNIFICANT CHANGE UP (ref 80–100)
NRBC # BLD: 0 /100 WBCS — SIGNIFICANT CHANGE UP (ref 0–0)
PHOSPHATE SERPL-MCNC: 2.6 MG/DL — SIGNIFICANT CHANGE UP (ref 2.5–4.5)
PLATELET # BLD AUTO: 688 K/UL — HIGH (ref 150–400)
POTASSIUM SERPL-MCNC: 3.8 MMOL/L — SIGNIFICANT CHANGE UP (ref 3.5–5.3)
POTASSIUM SERPL-SCNC: 3.8 MMOL/L — SIGNIFICANT CHANGE UP (ref 3.5–5.3)
RBC # BLD: 3.57 M/UL — LOW (ref 3.8–5.2)
RBC # FLD: 15.4 % — HIGH (ref 10.3–14.5)
SODIUM SERPL-SCNC: 142 MMOL/L — SIGNIFICANT CHANGE UP (ref 135–145)
SPECIMEN SOURCE: SIGNIFICANT CHANGE UP
SPECIMEN SOURCE: SIGNIFICANT CHANGE UP
WBC # BLD: 12.04 K/UL — HIGH (ref 3.8–10.5)
WBC # FLD AUTO: 12.04 K/UL — HIGH (ref 3.8–10.5)

## 2022-07-19 PROCEDURE — 76536 US EXAM OF HEAD AND NECK: CPT | Mod: 26

## 2022-07-19 PROCEDURE — 99233 SBSQ HOSP IP/OBS HIGH 50: CPT | Mod: GC

## 2022-07-19 RX ORDER — LISINOPRIL 2.5 MG/1
10 TABLET ORAL DAILY
Refills: 0 | Status: DISCONTINUED | OUTPATIENT
Start: 2022-07-19 | End: 2022-07-23

## 2022-07-19 RX ORDER — DIPHENHYDRAMINE HYDROCHLORIDE AND LIDOCAINE HYDROCHLORIDE AND ALUMINUM HYDROXIDE AND MAGNESIUM HYDRO
10 KIT ONCE
Refills: 0 | Status: COMPLETED | OUTPATIENT
Start: 2022-07-19 | End: 2022-07-19

## 2022-07-19 RX ORDER — OXYCODONE HYDROCHLORIDE 5 MG/1
10 TABLET ORAL EVERY 4 HOURS
Refills: 0 | Status: DISCONTINUED | OUTPATIENT
Start: 2022-07-19 | End: 2022-07-20

## 2022-07-19 RX ORDER — NIFEDIPINE 30 MG
60 TABLET, EXTENDED RELEASE 24 HR ORAL DAILY
Refills: 0 | Status: DISCONTINUED | OUTPATIENT
Start: 2022-07-19 | End: 2022-07-23

## 2022-07-19 RX ORDER — OXYCODONE HYDROCHLORIDE 5 MG/1
15 TABLET ORAL EVERY 4 HOURS
Refills: 0 | Status: DISCONTINUED | OUTPATIENT
Start: 2022-07-19 | End: 2022-07-23

## 2022-07-19 RX ORDER — PANTOPRAZOLE SODIUM 20 MG/1
40 TABLET, DELAYED RELEASE ORAL
Refills: 0 | Status: DISCONTINUED | OUTPATIENT
Start: 2022-07-19 | End: 2022-07-23

## 2022-07-19 RX ORDER — FUROSEMIDE 40 MG
40 TABLET ORAL DAILY
Refills: 0 | Status: DISCONTINUED | OUTPATIENT
Start: 2022-07-19 | End: 2022-07-23

## 2022-07-19 RX ORDER — DIPHENHYDRAMINE HYDROCHLORIDE AND LIDOCAINE HYDROCHLORIDE AND ALUMINUM HYDROXIDE AND MAGNESIUM HYDRO
KIT
Refills: 0 | Status: COMPLETED | OUTPATIENT
Start: 2022-07-19 | End: 2022-07-19

## 2022-07-19 RX ADMIN — HEPARIN SODIUM 7500 UNIT(S): 5000 INJECTION INTRAVENOUS; SUBCUTANEOUS at 05:43

## 2022-07-19 RX ADMIN — Medication 3 MILLILITER(S): at 00:38

## 2022-07-19 RX ADMIN — Medication 100 MILLIGRAM(S): at 21:37

## 2022-07-19 RX ADMIN — HEPARIN SODIUM 7500 UNIT(S): 5000 INJECTION INTRAVENOUS; SUBCUTANEOUS at 21:37

## 2022-07-19 RX ADMIN — Medication 3 MILLILITER(S): at 05:40

## 2022-07-19 RX ADMIN — Medication 1.25 MILLIGRAM(S): at 05:40

## 2022-07-19 RX ADMIN — HUMAN INSULIN 30 UNIT(S): 100 INJECTION, SUSPENSION SUBCUTANEOUS at 18:30

## 2022-07-19 RX ADMIN — PANTOPRAZOLE SODIUM 40 MILLIGRAM(S): 20 TABLET, DELAYED RELEASE ORAL at 18:00

## 2022-07-19 RX ADMIN — DIPHENHYDRAMINE HYDROCHLORIDE AND LIDOCAINE HYDROCHLORIDE AND ALUMINUM HYDROXIDE AND MAGNESIUM HYDRO 10 MILLILITER(S): KIT at 18:24

## 2022-07-19 RX ADMIN — Medication 40 MILLIGRAM(S): at 21:38

## 2022-07-19 RX ADMIN — Medication 4: at 00:20

## 2022-07-19 RX ADMIN — CHLORHEXIDINE GLUCONATE 1 APPLICATION(S): 213 SOLUTION TOPICAL at 18:25

## 2022-07-19 RX ADMIN — Medication 3 MILLILITER(S): at 17:59

## 2022-07-19 RX ADMIN — Medication 10 MILLIGRAM(S): at 05:39

## 2022-07-19 RX ADMIN — SERTRALINE 50 MILLIGRAM(S): 25 TABLET, FILM COATED ORAL at 14:29

## 2022-07-19 RX ADMIN — Medication 10 MILLIGRAM(S): at 00:21

## 2022-07-19 RX ADMIN — HEPARIN SODIUM 7500 UNIT(S): 5000 INJECTION INTRAVENOUS; SUBCUTANEOUS at 14:28

## 2022-07-19 RX ADMIN — POLYETHYLENE GLYCOL 3350 17 GRAM(S): 17 POWDER, FOR SOLUTION ORAL at 17:59

## 2022-07-19 RX ADMIN — Medication 1.25 MILLIGRAM(S): at 00:20

## 2022-07-19 RX ADMIN — Medication 500000 UNIT(S): at 14:29

## 2022-07-19 RX ADMIN — HUMAN INSULIN 30 UNIT(S): 100 INJECTION, SUSPENSION SUBCUTANEOUS at 12:00

## 2022-07-19 RX ADMIN — Medication 2: at 12:00

## 2022-07-19 RX ADMIN — Medication 2: at 07:23

## 2022-07-19 RX ADMIN — Medication 4: at 18:30

## 2022-07-19 RX ADMIN — Medication 100 MILLIGRAM(S): at 14:30

## 2022-07-19 RX ADMIN — SENNA PLUS 5 MILLILITER(S): 8.6 TABLET ORAL at 21:37

## 2022-07-19 RX ADMIN — NALOXEGOL OXALATE 25 MILLIGRAM(S): 12.5 TABLET, FILM COATED ORAL at 14:30

## 2022-07-19 NOTE — PROGRESS NOTE ADULT - PROBLEM SELECTOR PLAN 1
Start Lyrica 100 mg every 8 hours (CrCl = 219.1)  Start Oxycodone 10 mg every 4 hours PRN moderate pain and 15 mg every 4 hours PRN severe pain  Warm/cool packs for comfort  Bowel Regimen  Incentive Spirometer per primary team  PT per primary team  Monitor for sedation, respiratory depression  Narcan Rescue Kit on discharge (Naloxone 4 mg/0.1 ml nasal spray - 1 spray q 2-3 minutes alternating between nostrils) - Start Lyrica 100 mg every 8 hours (CrCl = 219.1)  - Mod pain: Start Oxycodone 10 mg every 4 hours PRN   - Severe pain: Oxycodone 15 mg Q4H PRN  - Warm/cool packs for comfort  - Bowel Regimen  - Incentive Spirometer per primary team  - PT referral  - Monitor for sedation, respiratory depression  - Narcan Rescue Kit on discharge (Naloxone 4 mg/0.1 ml nasal spray - 1 spray q 2-3 minutes alternating between nostrils)

## 2022-07-19 NOTE — SWALLOW FEES ASSESSMENT ADULT - NS SWALLOW FEES REC ASPIR MON
Monitor for s/s aspiration/laryngeal penetration. If noted:  D/C p.o. intake, provide non-oral nutrition/hydration/meds, and contact this service @ [phone]/change of breathing pattern/cough/gurgly voice/fever/pneumonia/throat clearing/upper respiratory infection

## 2022-07-19 NOTE — PROGRESS NOTE ADULT - ASSESSMENT
Fauzia Linares is a 52 year old with  CRPS, MO, DM2, HTN, HTN, IBS, chronic back pain, pseudotumor cerebri, recent spinal nerve stimulator placed (7/8/22 @ [facility]) who initially presented with hypoxic respiratory failure, found to have multifocal pna 2/2 aspiration pneumonia now with increased work of breathing on bipap admitted to MICU. s/p intubation for hypoxic resp failure on bipap and with severe ARDS likely 2/2 Asp PNA. now extubated.     #Neuro  Pt extubated on 7/17   //s/p spinal stimulator w/ chronic back pain, CRPS. Spinal cord stimulator extracted on 7/14  - pt 's dilaudid dose increased to 1mg q4  - sertraline on hold as pt is npo   - ativan prn for acute anxiety  - f/u pain management recs  //aniscoria   - no focal deficits   - f/u CT head read     #Cardiovascular  //Sepsis, vasoplegia 2/2 sedation  - htn to 170s s/p cardene gtt   - lasix 20mg IV (for home 40mg po), vasotec 1.25IV (for home lisinopril), labetolol 10mg q6 IV, hydralazine 10 prn TID for HTN  - home nifedipineXL,lisinopril, lasix on hold as npo      #Respiratory  //S/p ARDS likely 2/2 bilat asp pna   - CT demonstrating significant bilat pna   - Bipap ON s/p extubation  - now satting adequately on RA       #GI/Nutrition  //hx IBS   - NPO diet for now  - f/u TEES diego as per speech and swallow   - cont home protonix   - bowel regimen and movantik  - will hold home bentyl, simethicone, loperamide while recieving opioids for pain      #/Renal  - Crn stable (0.73)  - making good UO    #ID  //Asp Pna, persistent fevers   -  pt remains afebrile   - cont tylenol q6 PRN if respikes fever, LFTS mildly elevated, will cont to monitor   - zosyn for 7 day course for asp pna to end today . completed course of 3 days of Zithro.   - bcx negx2, sputum cx neg, UA negative   - RVP positive for hMPV, now off contact   -nystatin swish for oral thrush     #Endocrine  //T2DM  -  FS in low 200's   - goal BG<180  - increased NPH to 30 units q6hr     #Hematologic/DVT ppx  - c/w heparin 7500 q8 subQ for DVT ppx    #Ethics  full code  pt no longer has any icu needs and is stable for downgrade  Fauzia Linares is a 52 year old with  CRPS, MO, DM2, HTN, HTN, IBS, chronic back pain, pseudotumor cerebri, recent spinal nerve stimulator placed (7/8/22 @ [facility]) who initially presented with hypoxic respiratory failure, found to have multifocal pna 2/2 aspiration pneumonia now with increased work of breathing on bipap admitted to MICU. s/p intubation for hypoxic resp failure on bipap and with severe ARDS likely 2/2 Asp PNA. Now extubated and downgraded.

## 2022-07-19 NOTE — SWALLOW FEES ASSESSMENT ADULT - SLP GENERAL OBSERVATIONS
Patient encountered awake and alert, positioned upright in bed, +2L/NC. Pleasant and cooperative throughout. Able to follow commands for exam purposes.

## 2022-07-19 NOTE — PROGRESS NOTE ADULT - PROBLEM SELECTOR PLAN 4
- lasix 20mg IV (for home 40mg po), vasotec 1.25IV (for home lisinopril), labetolol 10mg q6 IV, hydralazine 10 prn TID for HTN  - home nifedipineXL,lisinopril, lasix on hold as npo - Restart home lasix 20mg IV (for home 40mg po), vasotec 1.25IV (for home lisinopril), labetolol 10mg q6 IV, hydralazine 10 prn TID for HTN

## 2022-07-19 NOTE — SWALLOW FEES ASSESSMENT ADULT - RECOMMENDED FEEDING/EATING TECHNIQUES
allow of breaks throughout meals/allow for swallow between intakes/alternate food with liquid/maintain upright posture during/after eating for 30 mins/small sips/bites

## 2022-07-19 NOTE — PROGRESS NOTE ADULT - ATTENDING COMMENTS
51 y/o morbidly obese F w/ extensive medical history including pseudotumor cerebri and chronic back pain s/p recent placement of spinal nerve stimulator prior to admission admitted to MICU for acute hypoxemic respiratory failure secondary to likely bacterial PNA and hMPV infection.     1. Acute Hypoxemic and Hypercapnic Respiratory Failure - Resolved, extubated on 7/17  - 2/2 bacterial pneumonia and hMPV infection   - continue NIPPV QHS and PRN for now and monitor serum bicarb  - Maintain O2 sat > 90%  - Aspiration precautions  - Wean O2 as tolerated  2. Oropharyngeal dysphagia s/p intubation.   - Swallow evaluation with FEES today recommended regular diet with thin liquids  - Diet advanced today  3. Multifocal PNA - Concerning for aspiration PNA  - completed course of Zosyn  - Spinal stimulator leads removed during this stay  - hMPV continue supportive care  - cheek lesion - likely related to proning  4 Essential hypertension   - Resume home antihypertensives today as now can tolerate diet  6. Chronic Pain -   - Seen by Chronic pain, recommendations appreciated.   - Will resume Lyrica, start Oxycodone  - anxiety is also an issue - currently requiring Precedex for control and has been given IV benzo overnight with some improvement  - needs more consistent outpatient pain control  - Check CTH to ensure no acute changes - as patient has anisocoria this AM  - Neurosurgery followup noted  7. Anisocoria- Appears resolved today  - CTH negative for acute pathology.   8.  DM II  Currently on NPH. Will likely need to be transitioned to basal bolus regimen now that will be started on diet

## 2022-07-19 NOTE — PROGRESS NOTE ADULT - SUBJECTIVE AND OBJECTIVE BOX
Internal Medicine   Abeba Hafsa | PGY-1    OVERNIGHT EVENTS: No acute overnight events.    SUBJECTIVE:       MEDICATIONS  (STANDING):  albuterol/ipratropium for Nebulization 3 milliLiter(s) Nebulizer every 6 hours  chlorhexidine 4% Liquid 1 Application(s) Topical <User Schedule>  enalaprilat Injectable 1.25 milliGRAM(s) IV Push every 6 hours  glucagon  Injectable 1 milliGRAM(s) IntraMuscular once  heparin   Injectable 7500 Unit(s) SubCutaneous every 8 hours  insulin lispro (ADMELOG) corrective regimen sliding scale   SubCutaneous every 6 hours  insulin NPH human recombinant 30 Unit(s) SubCutaneous every 6 hours  labetalol Injectable 10 milliGRAM(s) IV Push every 6 hours  naloxegol 25 milliGRAM(s) Oral daily  nystatin    Suspension 364974 Unit(s) Swish and Swallow daily  pantoprazole  Injectable 40 milliGRAM(s) IV Push daily  polyethylene glycol 3350 17 Gram(s) Oral every 12 hours  senna Syrup 5 milliLiter(s) Oral at bedtime  sertraline 50 milliGRAM(s) Oral daily    MEDICATIONS  (PRN):  hydrALAZINE Injectable 10 milliGRAM(s) IV Push three times a day PRN SBP > 170  HYDROmorphone  Injectable 1 milliGRAM(s) IV Push every 4 hours PRN Severe Pain (7 - 10)        T(F): 98.4 (07-18-22 @ 23:51), Max: 98.6 (07-18-22 @ 12:00)  HR: 79 (07-19-22 @ 05:30) (70 - 100)  BP: 145/78 (07-19-22 @ 05:30) (145/78 - 215/89)  BP(mean): 100 (07-18-22 @ 22:00) (92 - 125)  RR: 23 (07-19-22 @ 05:30) (12 - 31)  SpO2: 99% (07-19-22 @ 05:30) (91% - 100%)    PHYSICAL EXAM:     GENERAL: NAD, lying in bed comfortably  HEAD:  Atraumatic, Normocephalic  EYES: EOMI, PERRLA, conjunctiva and sclera clear, no nystagmus noted  ENT: Moist mucous membranes,   NECK: Supple, No JVD, trachea midline  CHEST/LUNG: Clear to auscultation bilaterally; No rales, rhonchi, wheezing, or rubs. Unlabored respirations  HEART: Regular rate and rhythm; No murmurs, rubs, or gallops, normal S1/S2  ABDOMEN: normal bowel sounds; Soft, nontender, nondistended, no organomegaly   EXTREMITIES:  2+ Peripheral Pulses, brisk capillary refill. No clubbing, cyanosis, or edema  MSK: No gross deformities noted   Neurological:  A&Ox3, no focal deficits   SKIN: No rashes or lesions  PSYCH: Normal mood, affect     TELEMETRY:    LABS:                        8.7    14.41 )-----------( 653      ( 18 Jul 2022 01:01 )             28.6     07-18    142  |  106  |  18  ----------------------------<  113<H>  3.0<L>   |  24  |  0.80    Ca    8.7      18 Jul 2022 01:01  Phos  3.2     07-18  Mg     2.1     07-18    TPro  7.6  /  Alb  3.2<L>  /  TBili  0.4  /  DBili  x   /  AST  38  /  ALT  40  /  AlkPhos  189<H>  07-18        PTT - ( 18 Jul 2022 01:01 )  PTT:26.7 sec    Creatinine Trend: 0.80<--, 0.82<--, 0.73<--, 0.97<--, 0.89<--, 1.08<--  I&O's Summary    18 Jul 2022 07:01  -  19 Jul 2022 07:00  --------------------------------------------------------  IN: 150 mL / OUT: 480 mL / NET: -330 mL      BNP    RADIOLOGY & ADDITIONAL STUDIES:             Internal Medicine   Abeba Ro | PGY-1    OVERNIGHT EVENTS: No acute overnight events.    SUBJECTIVE: PT seen at bedside. Pt reports generalized chest pain and rates pain 9/10. Pt denies SOB, N/V/D. Pt reports regular BM.       MEDICATIONS  (STANDING):  albuterol/ipratropium for Nebulization 3 milliLiter(s) Nebulizer every 6 hours  chlorhexidine 4% Liquid 1 Application(s) Topical <User Schedule>  enalaprilat Injectable 1.25 milliGRAM(s) IV Push every 6 hours  glucagon  Injectable 1 milliGRAM(s) IntraMuscular once  heparin   Injectable 7500 Unit(s) SubCutaneous every 8 hours  insulin lispro (ADMELOG) corrective regimen sliding scale   SubCutaneous every 6 hours  insulin NPH human recombinant 30 Unit(s) SubCutaneous every 6 hours  labetalol Injectable 10 milliGRAM(s) IV Push every 6 hours  naloxegol 25 milliGRAM(s) Oral daily  nystatin    Suspension 414006 Unit(s) Swish and Swallow daily  pantoprazole  Injectable 40 milliGRAM(s) IV Push daily  polyethylene glycol 3350 17 Gram(s) Oral every 12 hours  senna Syrup 5 milliLiter(s) Oral at bedtime  sertraline 50 milliGRAM(s) Oral daily    MEDICATIONS  (PRN):  hydrALAZINE Injectable 10 milliGRAM(s) IV Push three times a day PRN SBP > 170  HYDROmorphone  Injectable 1 milliGRAM(s) IV Push every 4 hours PRN Severe Pain (7 - 10)        T(F): 98.4 (07-18-22 @ 23:51), Max: 98.6 (07-18-22 @ 12:00)  HR: 79 (07-19-22 @ 05:30) (70 - 100)  BP: 145/78 (07-19-22 @ 05:30) (145/78 - 215/89)  BP(mean): 100 (07-18-22 @ 22:00) (92 - 125)  RR: 23 (07-19-22 @ 05:30) (12 - 31)  SpO2: 99% (07-19-22 @ 05:30) (91% - 100%)    PHYSICAL EXAM:     GENERAL: NAD, lying in bed comfortably  HEAD:  +Tender and indurated R cheek abrasion, Atraumatic, Normocephalic  EYES: EOMI, PERRLA, conjunctiva and sclera clear, no nystagmus noted  ENT: Moist mucous membranes,   NECK: Supple, No JVD, trachea midline  CHEST/LUNG: Clear to auscultation bilaterally; No rales, rhonchi, wheezing, or rubs. Unlabored respirations  HEART: Regular rate and rhythm; No murmurs, rubs, or gallops, normal S1/S2  ABDOMEN: normal bowel sounds; Soft, nontender, nondistended, no organomegaly   EXTREMITIES: 2+brett LE edema; 2+ Peripheral Pulses, brisk capillary refill. No clubbing, cyanosis  MSK: No gross deformities noted   Neurological:  A&Ox3, no focal deficits   SKIN: No rashes or lesions  PSYCH: Normal mood, affect     TELEMETRY:    LABS:                        8.7    14.41 )-----------( 653      ( 18 Jul 2022 01:01 )             28.6     07-18    142  |  106  |  18  ----------------------------<  113<H>  3.0<L>   |  24  |  0.80    Ca    8.7      18 Jul 2022 01:01  Phos  3.2     07-18  Mg     2.1     07-18    TPro  7.6  /  Alb  3.2<L>  /  TBili  0.4  /  DBili  x   /  AST  38  /  ALT  40  /  AlkPhos  189<H>  07-18        PTT - ( 18 Jul 2022 01:01 )  PTT:26.7 sec    Creatinine Trend: 0.80<--, 0.82<--, 0.73<--, 0.97<--, 0.89<--, 1.08<--  I&O's Summary    18 Jul 2022 07:01  -  19 Jul 2022 07:00  --------------------------------------------------------  IN: 150 mL / OUT: 480 mL / NET: -330 mL      BNP    RADIOLOGY & ADDITIONAL STUDIES:

## 2022-07-19 NOTE — SWALLOW FEES ASSESSMENT ADULT - COMMENTS
Since admission, pt SOB worsened on NRB requiring bipap. Pt fever also spiked to 104.1. MICU initially consulted for multifocal pna on Bipap, but at the time pt was not tachypnic. However, this AM pt became increasingly more tachypneic and reports feeling more tired on bipap 12/8/70% satting 89-90%. MICU was reconsulted for increased work of breathing on bipap. Pt was intubated on 7/11, and was treated in the MICU with zosyn for aspiration PNA. Pt became febrile a few times, however bronchoalveolar lavage and blood cultures showed no growth. Pt was weaned off pressors and sedation and successfully trialled on CPAP, with extubation on 7/17. Additionally, her spinal cord stimulator was removed by neurosurgery team on 7/14. adequate vocal fold adduction/abduction

## 2022-07-19 NOTE — PROGRESS NOTE ADULT - PROBLEM SELECTOR PLAN 5
-  pt remains afebrile   - cont tylenol q6 PRN if respikes fever, LFTS mildly elevated, will cont to monitor   - zosyn for 7 day course for asp pna to end today . completed course of 3 days of Zithro.   - bcx negx2, sputum cx neg, UA negative   - RVP positive for hMPV, now off contact   - nystatin swish for oral thrush -  pt remains afebrile   - cont tylenol q6 PRN if respikes fever, LFTS mildly elevated, will cont to monitor   - Wean off 2L O2  - bcx negx2, sputum cx neg, UA negative   - RVP positive for hMPV, now off contact   - nystatin swish for oral thrush

## 2022-07-19 NOTE — SWALLOW FEES ASSESSMENT ADULT - DIAGNOSTIC IMPRESSIONS
Patient intubated 7/11-7/17 with ARDS 2/2 suspected aspiration PNA. +Hx of pseudotumor cerebri with spinal nerve stimulator which was removed while intubated. Patient seen for FEES to r/o dysphagia and presents with mildly prolonged, but functional, mastication of solids and spillage to the level of the pyriform sinus with large sips of thin liquids. No aspiration or laryngeal penetration observed across consistencies, but pt encouraged to take small bites/sips and allow for rest breaks throughout meals given c/o feeling anxious at times and progressive fatigue.

## 2022-07-19 NOTE — SWALLOW FEES ASSESSMENT ADULT - ORAL PHASE COMMENTS
spillage to the valleculae spillage to the pyriform sinus; spillage reduced with small single sips mildly prolonged, but efficient, mastication

## 2022-07-19 NOTE — PROGRESS NOTE ADULT - PROBLEM SELECTOR PLAN 3
- NPO diet for now  - f/u TEES diego as per speech and swallow   - cont home protonix   - bowel regimen and movantik  - will hold home bentyl, simethicone, loperamide while recieving opioids for pain - Pt passed speech and swallow test; approved for regular solids and thin liquids  - Diet: Consistent Carbohydrate  - cont home protonix   - bowel regimen and movantik  - Hold home bentyl, simethicone, loperamide while receiving opioids for pain

## 2022-07-19 NOTE — ADVANCED PRACTICE NURSE CONSULT - RECOMMEDATIONS
Will recommend the followin.  Right cheek: continue to monitor, apply Cavilon daily  2. Consider a Plastic Surgery consult for further evaluation  3. continue to encourage mobility, T&P  4. Complete cair boots  5. Seat cushion when OOB to chair  6. nutrition consult  Tx plan discussed with RN and Team 8

## 2022-07-19 NOTE — SWALLOW FEES ASSESSMENT ADULT - SLP PERTINENT HISTORY OF CURRENT PROBLEM
52F c hx CRPS, MO, DM2, HTN, HTN, IBS, chronic back pain, pseudotumor cerebri, recent spinal nerve stimulator placed (7/8/22 @ [facility]), pw 2 days fevers, SOB, productive cough. Pt reports having the neurostimulator placed at [facility] as an outpt procedure under conscious sedation, was observed, discharged home. That night at home, pt started to fever, SOB, productive cough. Pt states her symptoms have progressively gotten worse since then. Pt states she is having orthopnea and also can't lay flat 2/2 recent spinal stimulator. Reports b/l pleuritic rib pain. Denies urinary symptoms, diarrhea, abd pain. Pt ambulates with walker at baseline.

## 2022-07-19 NOTE — PROGRESS NOTE ADULT - PROBLEM SELECTOR PLAN 2
-  FS in low 200's   - goal BG<180  - increased NPH to 30 units q6hr - FS in low 200's   - goal BG<180  - NPH 30 units q6hr

## 2022-07-19 NOTE — ADVANCED PRACTICE NURSE CONSULT - REASON FOR CONSULT
Requested by staff to assess skin status: right cheek. PMH is noted:  Fauzia Linares is a 52 year old with  CRPS, MO, DM2, HTN, HTN, IBS, chronic back pain, pseudotumor cerebri, recent spinal nerve stimulator placed (7/8/22 @ [facility]) who initially presented with hypoxic respiratory failure, found to have multifocal pna 2/2 aspiration pneumonia now with increased work of breathing on bipap admitted to MICU. s/p intubation for hypoxic resp failure on bipap and with severe ARDS likely 2/2 Asp PNA. Now extubated and downgraded.  the pt is now transferred from MICU to [facility].

## 2022-07-19 NOTE — ADVANCED PRACTICE NURSE CONSULT - ASSESSMENT
The pt was encountered on [facility]- Ms Linares is in a Bariatric support surface and needs assistance with T&P, Will recommend Complete Cair boots to off-load her heels.  Her BMI is >40- will request a nutrition consult for further evaluation.   She has a wound on her right cheek- it has a superficial appearance with pink tissue, it measures 2cmx 2.5cm x 0cm- the periwound skin is darkened , and firm to touch, very tender to palpation. Her right cheek is swollen as compared to the left.  Discussed with Team 8 and suggested further evaluation by Plastic surgery   Will recommend Cavilon at this time to lay down a protective coating on the skin

## 2022-07-20 LAB
ALBUMIN SERPL ELPH-MCNC: 3.6 G/DL — SIGNIFICANT CHANGE UP (ref 3.3–5)
ALP SERPL-CCNC: 171 U/L — HIGH (ref 40–120)
ALT FLD-CCNC: 35 U/L — SIGNIFICANT CHANGE UP (ref 10–45)
ANION GAP SERPL CALC-SCNC: 16 MMOL/L — SIGNIFICANT CHANGE UP (ref 5–17)
AST SERPL-CCNC: 30 U/L — SIGNIFICANT CHANGE UP (ref 10–40)
BILIRUB SERPL-MCNC: 0.6 MG/DL — SIGNIFICANT CHANGE UP (ref 0.2–1.2)
BUN SERPL-MCNC: 11 MG/DL — SIGNIFICANT CHANGE UP (ref 7–23)
CALCIUM SERPL-MCNC: 9.1 MG/DL — SIGNIFICANT CHANGE UP (ref 8.4–10.5)
CHLORIDE SERPL-SCNC: 103 MMOL/L — SIGNIFICANT CHANGE UP (ref 96–108)
CO2 SERPL-SCNC: 24 MMOL/L — SIGNIFICANT CHANGE UP (ref 22–31)
CREAT SERPL-MCNC: 0.77 MG/DL — SIGNIFICANT CHANGE UP (ref 0.5–1.3)
EGFR: 93 ML/MIN/1.73M2 — SIGNIFICANT CHANGE UP
GLUCOSE BLDC GLUCOMTR-MCNC: 115 MG/DL — HIGH (ref 70–99)
GLUCOSE BLDC GLUCOMTR-MCNC: 134 MG/DL — HIGH (ref 70–99)
GLUCOSE BLDC GLUCOMTR-MCNC: 174 MG/DL — HIGH (ref 70–99)
GLUCOSE BLDC GLUCOMTR-MCNC: 206 MG/DL — HIGH (ref 70–99)
GLUCOSE BLDC GLUCOMTR-MCNC: 206 MG/DL — HIGH (ref 70–99)
GLUCOSE SERPL-MCNC: 170 MG/DL — HIGH (ref 70–99)
HCT VFR BLD CALC: 32.9 % — LOW (ref 34.5–45)
HGB BLD-MCNC: 10.1 G/DL — LOW (ref 11.5–15.5)
MAGNESIUM SERPL-MCNC: 1.8 MG/DL — SIGNIFICANT CHANGE UP (ref 1.6–2.6)
MCHC RBC-ENTMCNC: 28 PG — SIGNIFICANT CHANGE UP (ref 27–34)
MCHC RBC-ENTMCNC: 30.7 GM/DL — LOW (ref 32–36)
MCV RBC AUTO: 91.1 FL — SIGNIFICANT CHANGE UP (ref 80–100)
NRBC # BLD: 0 /100 WBCS — SIGNIFICANT CHANGE UP (ref 0–0)
PHOSPHATE SERPL-MCNC: 3 MG/DL — SIGNIFICANT CHANGE UP (ref 2.5–4.5)
PLATELET # BLD AUTO: 681 K/UL — HIGH (ref 150–400)
POTASSIUM SERPL-MCNC: 3.1 MMOL/L — LOW (ref 3.5–5.3)
POTASSIUM SERPL-SCNC: 3.1 MMOL/L — LOW (ref 3.5–5.3)
PROT SERPL-MCNC: 7.9 G/DL — SIGNIFICANT CHANGE UP (ref 6–8.3)
RBC # BLD: 3.61 M/UL — LOW (ref 3.8–5.2)
RBC # FLD: 15 % — HIGH (ref 10.3–14.5)
SODIUM SERPL-SCNC: 143 MMOL/L — SIGNIFICANT CHANGE UP (ref 135–145)
WBC # BLD: 9.82 K/UL — SIGNIFICANT CHANGE UP (ref 3.8–10.5)
WBC # FLD AUTO: 9.82 K/UL — SIGNIFICANT CHANGE UP (ref 3.8–10.5)

## 2022-07-20 PROCEDURE — 99232 SBSQ HOSP IP/OBS MODERATE 35: CPT | Mod: GC

## 2022-07-20 RX ORDER — POTASSIUM CHLORIDE 20 MEQ
20 PACKET (EA) ORAL
Refills: 0 | Status: COMPLETED | OUTPATIENT
Start: 2022-07-20 | End: 2022-07-20

## 2022-07-20 RX ADMIN — Medication 1200 MILLIGRAM(S): at 12:33

## 2022-07-20 RX ADMIN — HUMAN INSULIN 30 UNIT(S): 100 INJECTION, SUSPENSION SUBCUTANEOUS at 06:12

## 2022-07-20 RX ADMIN — Medication 3 MILLILITER(S): at 13:52

## 2022-07-20 RX ADMIN — HUMAN INSULIN 30 UNIT(S): 100 INJECTION, SUSPENSION SUBCUTANEOUS at 00:43

## 2022-07-20 RX ADMIN — Medication 4: at 00:43

## 2022-07-20 RX ADMIN — HYDROMORPHONE HYDROCHLORIDE 1 MILLIGRAM(S): 2 INJECTION INTRAMUSCULAR; INTRAVENOUS; SUBCUTANEOUS at 00:58

## 2022-07-20 RX ADMIN — HEPARIN SODIUM 7500 UNIT(S): 5000 INJECTION INTRAVENOUS; SUBCUTANEOUS at 21:26

## 2022-07-20 RX ADMIN — HYDROMORPHONE HYDROCHLORIDE 1 MILLIGRAM(S): 2 INJECTION INTRAMUSCULAR; INTRAVENOUS; SUBCUTANEOUS at 12:50

## 2022-07-20 RX ADMIN — HYDROMORPHONE HYDROCHLORIDE 1 MILLIGRAM(S): 2 INJECTION INTRAMUSCULAR; INTRAVENOUS; SUBCUTANEOUS at 12:39

## 2022-07-20 RX ADMIN — SERTRALINE 50 MILLIGRAM(S): 25 TABLET, FILM COATED ORAL at 13:52

## 2022-07-20 RX ADMIN — Medication 1200 MILLIGRAM(S): at 17:38

## 2022-07-20 RX ADMIN — HEPARIN SODIUM 7500 UNIT(S): 5000 INJECTION INTRAVENOUS; SUBCUTANEOUS at 06:15

## 2022-07-20 RX ADMIN — Medication 20 MILLIEQUIVALENT(S): at 17:36

## 2022-07-20 RX ADMIN — Medication 3 MILLILITER(S): at 00:42

## 2022-07-20 RX ADMIN — LISINOPRIL 10 MILLIGRAM(S): 2.5 TABLET ORAL at 06:14

## 2022-07-20 RX ADMIN — HUMAN INSULIN 30 UNIT(S): 100 INJECTION, SUSPENSION SUBCUTANEOUS at 17:35

## 2022-07-20 RX ADMIN — Medication 3 MILLILITER(S): at 06:11

## 2022-07-20 RX ADMIN — OXYCODONE HYDROCHLORIDE 10 MILLIGRAM(S): 5 TABLET ORAL at 01:38

## 2022-07-20 RX ADMIN — HYDROMORPHONE HYDROCHLORIDE 1 MILLIGRAM(S): 2 INJECTION INTRAMUSCULAR; INTRAVENOUS; SUBCUTANEOUS at 00:43

## 2022-07-20 RX ADMIN — Medication 60 MILLIGRAM(S): at 06:41

## 2022-07-20 RX ADMIN — Medication 100 MILLIGRAM(S): at 13:56

## 2022-07-20 RX ADMIN — Medication 3 MILLILITER(S): at 17:36

## 2022-07-20 RX ADMIN — Medication 2: at 12:31

## 2022-07-20 RX ADMIN — Medication 500000 UNIT(S): at 13:51

## 2022-07-20 RX ADMIN — NALOXEGOL OXALATE 25 MILLIGRAM(S): 12.5 TABLET, FILM COATED ORAL at 13:52

## 2022-07-20 RX ADMIN — SENNA PLUS 5 MILLILITER(S): 8.6 TABLET ORAL at 21:25

## 2022-07-20 RX ADMIN — OXYCODONE HYDROCHLORIDE 15 MILLIGRAM(S): 5 TABLET ORAL at 22:26

## 2022-07-20 RX ADMIN — HUMAN INSULIN 30 UNIT(S): 100 INJECTION, SUSPENSION SUBCUTANEOUS at 12:31

## 2022-07-20 RX ADMIN — PANTOPRAZOLE SODIUM 40 MILLIGRAM(S): 20 TABLET, DELAYED RELEASE ORAL at 06:15

## 2022-07-20 RX ADMIN — HEPARIN SODIUM 7500 UNIT(S): 5000 INJECTION INTRAVENOUS; SUBCUTANEOUS at 13:52

## 2022-07-20 RX ADMIN — OXYCODONE HYDROCHLORIDE 15 MILLIGRAM(S): 5 TABLET ORAL at 21:26

## 2022-07-20 RX ADMIN — Medication 40 MILLIGRAM(S): at 06:15

## 2022-07-20 RX ADMIN — Medication 20 MILLIEQUIVALENT(S): at 16:41

## 2022-07-20 RX ADMIN — Medication 100 MILLIGRAM(S): at 06:14

## 2022-07-20 RX ADMIN — Medication 100 MILLIGRAM(S): at 21:26

## 2022-07-20 RX ADMIN — OXYCODONE HYDROCHLORIDE 10 MILLIGRAM(S): 5 TABLET ORAL at 02:20

## 2022-07-20 RX ADMIN — CHLORHEXIDINE GLUCONATE 1 APPLICATION(S): 213 SOLUTION TOPICAL at 06:12

## 2022-07-20 NOTE — CHART NOTE - NSCHARTNOTEFT_GEN_A_CORE
Nutrition Follow Up Note  Patient seen for: consult for dietitian     Chart reviewed, events noted. Per chart "Fauzia Linares is a 52 year old with  CRPS, MO, DM2, HTN, HTN, IBS, chronic back pain, pseudotumor cerebri, recent spinal nerve stimulator placed (22 @ [facility]) who initially presented with hypoxic respiratory failure, found to have multifocal pna 2/2 aspiration pneumonia now with increased work of breathing on bipap admitted to MICU. s/p intubation for hypoxic resp failure on bipap and with severe ARDS likely 2/2 Asp PNA. Now extubated and downgraded."     Source: [X] Patient       [x] EMR         Diet Order:   Diet, Regular:   Consistent Carbohydrate {Evening Snack} (CSTCHOSN)  DASH/TLC {Sodium & Cholesterol Restricted} (DASH) (22)    - Is current order appropriate/adequate? [] Yes  []  No:     - PO intake :   [] >75%  Adequate    [] 50-75%  Fair       [X] <50%  Poor    - Nutrition Hx per Pt (Unable to obtain at time of initial RD assessment)      - NKFA, Food intolerances: applesauce (diarrhea), pineapple (diarrhea), citrus (Diarrhea)      - Therapeutic restrictions/modifications: consistent carbohydrate diet (CHO portion control, no concentrated sweets).       - Pt reports good appetite/PO intake at baseline, decreased x 5 days PTA with little to no PO intake reported.      - Hx of DM managed with Metformin 2x/day, Tresiba 1x/day (PM), Novolog 3x/day (pre-prandial). Hgba1c 7.5%, indicate suboptimal glucose control PTA.       - Micronutrient/Other supplementation PTA: vitamin D3, Biotin. No Hx of protein-energy supplementation reported.      - NPO with tube feeds of Vital AF/Glucerna 1.2 (-) --> NPO x 4 days (-) --> Consistent carbohydrate with snack + DASH/TLC       - Diet was advanced () s/p swallow evaluation recommending regular texture foods and thin liquids.     - Nutrition-related concerns:      - Pt reports poor appetite/PO intake, <50% of meals consumed 2x/day (skipping dinner). Preference for soft foods and liquids noted in setting of oral pain secondary to thrush (improving).       - RD provided menu to Pt at bedside to order preferred foods PRN. Food preferences obtained; RD to honor as able.       - Amenable to receiving oral nutritional supplement to optimize PO intake: Glucerna 2x/day (440 dk, 20 Gm protein)       - K+ low today , s/p repletion. Mg and Phos WNL.       - Fingersticks and serum glucose in-house elevated in setting of reported poor PO intake on consistent carbohydrate diet. Continues on insulin regimen (NPH, ISS Lispro).      - No overt signs of muscle/fat depletion upon visual observation; nutrition focused physical examination not indicated at this time     GI: Denies N/V. No difficulty chewing/swallowing. Last BM yesterday .   Bowel Regimen? [X] Yes (Miralax, Senna)    Weights:   Daily Weight in k.1 ()  ** Weight trending upward in setting of IV fluids, diuretics. Likely secondary to fluid shifts.     Nutritionally Pertinent MEDICATIONS  (STANDING):  furosemide    Tablet  glucagon  Injectable  insulin lispro (ADMELOG) corrective regimen sliding scale  insulin NPH human recombinant  lisinopril  naloxegol  NIFEdipine XL  nystatin    Suspension  pantoprazole    Tablet  polyethylene glycol 3350  potassium chloride    Tablet ER  senna Syrup    Pertinent Labs:  @ 10:16: Na 143, BUN 11, Cr 0.77, <H>, K+ 3.1<L>, Phos 3.0, Mg 1.8, Alk Phos 171<H>, ALT/SGPT 35, AST/SGOT 30, HbA1c --    A1C with Estimated Average Glucose Result: 7.5 % (22 @ 00:18)    Finger Sticks:  POCT Blood Glucose.: 134 mg/dL ( @ 16:54)  POCT Blood Glucose.: 174 mg/dL ( @ 11:43)  POCT Blood Glucose.: 115 mg/dL ( @ 06:06)  POCT Blood Glucose.: 206 mg/dL ( @ 00:28)  POCT Blood Glucose.: 247 mg/dL ( @ 18:09)      Skin per nursing documentation: no pressure injury noted   Edema: 2+ generalized     Estimated Needs:   [X] recalculated: based on upper IBW 70 kg  Estimated Nutrient Needs:  - Energy: 5053-4356 dk/day (25-30 dk/kg)  - Protein: 70-84 Gm/day (1.0-1.2 Gm/kg)  - Fluid: Defer to team     Previous Nutrition Diagnosis: Overweight/obesity  Nutrition Diagnosis is: [X] ongoing  [] resolved [] not applicable     New Nutrition Diagnosis: [X] Inadequate protein-energy intake RT decreased ability to consume adequate protein-energy AEB <50% EER x 5 days     Nutrition Care Plan:  [X] In Progress  [] Achieved  [] Not applicable    Nutrition Interventions:     Education Provided:       [X] Yes:  [] No: Reviewed heart healthy consistent carbohydrate diet education - literature provided.        Recommendations:         [X] Continue current diet order: Consistent carbohydrate with snack + DASH/TLC            [X] Add oral nutrition supplement: Glucerna 2x/day (440 dk, 20 Gm protein)      [X] Encourage adequate consumption of meals/supplements to optimize protein-energy intake.      [X] Add micronutrient supplementation: multivitamin (unless contraindicated)     [X] Continue to monitor and replete electrolytes if low.      [X] Diet education reviewed, reinforce as needed.      Monitoring and Evaluation:   Continue to monitor nutritional intake, tolerance to diet prescription, weights, labs, skin integrity    RD remains available upon request and will follow up per protocol  Melinda Armstrong RDN [facility] Pager #[phone]

## 2022-07-20 NOTE — PROGRESS NOTE ADULT - PROBLEM SELECTOR PLAN 3
- Pt passed speech and swallow test; approved for regular solids and thin liquids  - Diet: Consistent Carbohydrate  - cont home protonix   - bowel regimen and movantik  - Hold home bentyl, simethicone, loperamide while receiving opioids for pain

## 2022-07-20 NOTE — PROGRESS NOTE ADULT - ASSESSMENT
Fauzia Linares is a 52 year old with  CRPS, MO, DM2, HTN, HTN, IBS, chronic back pain, pseudotumor cerebri, recent spinal nerve stimulator placed (7/8/22 @ [facility]) who initially presented with hypoxic respiratory failure, found to have multifocal pna 2/2 aspiration pneumonia now with increased work of breathing on bipap admitted to MICU. s/p intubation for hypoxic resp failure on bipap and with severe ARDS likely 2/2 Asp PNA. Now extubated and downgraded.

## 2022-07-20 NOTE — CHART NOTE - NSCHARTNOTEFT_GEN_A_CORE
52F c hx CRPS, MO, DM2, HTN, HTN, IBS, chronic back pain, pseudotumor cerebri, recent spinal nerve stimulator placed (7/8/22 @ [facility]), pw 2 days fevers, SOB, productive cough. Pt reports having the neurostimulator placed at [facility] as an outpt procedure under conscious sedation, was observed, discharged home. That night at home, pt started to fever, SOB, productive cough. Pt states her symptoms have progressively gotten worse since then. Pt states she is having orthopnea and also can't lay flat 2/2 recent spinal stimulator. Reports b/l pleuritic rib pain. Denies urinary symptoms, diarrhea, abd pain. Pt ambulates with walker at baseline.  (10 Jul 2022 23:45)    Pt with Aspiration PNA/Sepsis/ARDS requiring intubation and MICU care  Out of ICU    Current out- patient pain regimen: Lyrica 150 mg BID, Percocet 10 mg BID  Out Patient Pain Management provider: Dr. Vinny Maldonado    Patient's chronic pain starts mid back and wraps around, Bilaterally to underneath breasts. Pt states excellent response to SCS trial (Glamit). Trial leads recently extracted. Pain controlled but states she is "doped up". Understands that when stable/recovered she will see about SCS implant.      EMR reviewed, pain controlled with current regimen. Pain decreases from 10/10 to 2/10 or 6/10 to 3/10. Minimal use of opioids    Recommend:  Continue Lyrica 100 mg every 8 hours (CrCl = 219.1)  Continue Oxycodone 10 mg every 4 hours PRN moderate pain and 15 mg every 4 hours PRN severe pain  Warm/cool packs for comfort  Bowel Regimen  Incentive Spirometer per primary team  PT per primary team  Monitor for sedation, respiratory depression  Narcan Rescue Kit on discharge (Naloxone 4 mg/0.1 ml nasal spray - 1 spray q 2-3 minutes alternating between nostrils)    Signing off    [facility]  [phone]

## 2022-07-20 NOTE — PHYSICAL THERAPY INITIAL EVALUATION ADULT - PRECAUTIONS/LIMITATIONS, REHAB EVAL
(-) Head/Neck CT 7/18/22. +Chest CTA/A-P CT 7/10/22: Pneumonia. CXR 7/15/22: Bilateral airspace opacities. TTE with doppler 7/13/22: Minimal mitral valve regurgitation. Hyperdynamic left ventricular systolic function./fall precautions

## 2022-07-20 NOTE — PROGRESS NOTE ADULT - NUTRITIONAL ASSESSMENT
This patient has been assessed with a concern for Malnutrition and has been determined to have a diagnosis/diagnoses of Morbid obesity (BMI > 40).    This patient is being managed with:   Diet Regular-  Consistent Carbohydrate {Evening Snack} (CSTCHOSN)  DASH/TLC {Sodium & Cholesterol Restricted} (DASH)  Entered: Jul 19 2022 12:55PM     This patient has been assessed with a concern for Malnutrition and has been determined to have a diagnosis/diagnoses of Morbid obesity (BMI > 40).    This patient is being managed with:   Diet Regular-  Consistent Carbohydrate {Evening Snack} (CSTCHOSN)  DASH/TLC {Sodium & Cholesterol Restricted} (DASH)

## 2022-07-20 NOTE — PATIENT PROFILE ADULT - PACKS PER DAY
Problem: Mobility Impaired (Adult and Pediatric)  Goal: *Acute Goals and Plan of Care (Insert Text)  Description: Goals to be addressed in 1-3 days:  1. Supine to sit and sit to supine SBA with HR for meals. 2. Sit to stand and stand to sit SBA/CGA with RW in prep for ambulation. 3. Ambulate 100ft SBA/CGA with RW, WBAT, for home/community mobility. 4. Ascend/descend a 1 stair steps CGA/Mak with HR for home entry. Note: []  Patient has met MD yvette flores for d/c home  []  Recommend HH with 24 hour adult care   [x]  Benefit from additional acute PT session to address:  gait training, HEP    PHYSICAL THERAPY EVALUATION    Patient: Jessica Padilla (47 y.o. female)  Date: 7/20/2022  Primary Diagnosis: Total knee replacement status, left [Z96.652]  Procedure(s) (LRB):  LEFT TOTAL KNEE ARTHROPLASTY AND ALL INDICATED PROCEDURES \"SPEC POP\" (Left) Day of Surgery   Precautions:  Fall, WBAT (Simultaneous filing. User may not have seen previous data.)    ASSESSMENT :  Based on the objective data described below, the patient presents with lower extremity weakness, decreased gait quality and endurance, impaired bed mobility and transfers, s/p L TKA. Pt performed supine to sit with CGA, sit to stand with CGA. Patient ambulated 30 feet with RW, GB applied, CGA. Will continue to progress as pt tolerates. Patient will benefit from skilled intervention to address the above impairments.   Patient's rehabilitation potential is considered to be Good  Factors which may influence rehabilitation potential include:   []         None noted  []         Mental ability/status  []         Medical condition  []         Home/family situation and support systems  []         Safety awareness  []         Pain tolerance/management  []         Other:      PLAN :  Recommendations and Planned Interventions:   [x]           Bed Mobility Training             []    Neuromuscular Re-Education  [x]           Transfer Training []    Orthotic/Prosthetic Training  [x]           Gait Training                          [x]    Modalities  [x]           Therapeutic Exercises           [x]    Edema Management/Control  [x]           Therapeutic Activities            [x]    Family Training/Education  [x]           Patient Education  []           Other (comment):    Frequency/Duration: Patient will be followed by physical therapy 1-2x daily until patient is discharged from hospital.  Discharge Recommendations: Home Health  Further Equipment Recommendations for Discharge: N/A     SUBJECTIVE:   Patient stated I am doing ok.     OBJECTIVE DATA SUMMARY:     Past Medical History:   Diagnosis Date    Allergies 2007    Arthritis of knee, left     Arthritis of right hip     Autoimmune disease ([facility] 2016    fibromyalgia    Chronic back pain     Fibromyalgia     GERD (gastroesophageal reflux disease)     Hypercholesteremia     Hypertension 1990s    Intestinal malabsorption     Morbid obesity ([facility]     Morbid obesity with body mass index of 50.0-59.9 in adult [facility])     [facility]    post surgery, due to low K, treated and resolved    Status post bariatric surgery 5/2016    sleeve resection / terracina     Past Surgical History:   Procedure Laterality Date    HX Jairon Worthington 6    HX GI  5/2016    sleeve-Dr. Abraham Romero HERNIA REPAIR  2018    incisional  with mesh    HX HYSTERECTOMY  1995    RICCI    LAPAROSCOPY ABDOMEN DIAGNOSTIC  2018    aborted gastric bypass / terracina    RI TOTAL HIP ARTHROPLASTY Right 07/2017     Barriers to Learning/Limitations: None  Compensate with: Visual Cues and Verbal Cues  PLOF: Independnet ambulation without AD  Home Situation:  Home Situation  Home Environment: Apartment (Simultaneous filing. User may not have seen previous data.)  # Steps to Enter: 0 (Simultaneous filing. User may not have seen previous data.)  One/Two Story Residence: One story (Simultaneous filing.  User may not have seen previous data.)  Living Alone: Yes (Simultaneous filing. User may not have seen previous data.)  Support Systems: Child(traci) (Simultaneous filing. User may not have seen previous data.)  Patient Expects to be Discharged to[de-identified] Home with home health (Simultaneous filing. User may not have seen previous data.)  Current DME Used/Available at Home: Cane, straight, Crutches, Walker, rolling (Simultaneous filing. User may not have seen previous data.)  Tub or Shower Type: Tub/Shower combination  Strength:    Strength: Generally decreased, functional (Simultaneous filing. User may not have seen previous data.)  Tone & Sensation:   Tone: Normal (Simultaneous filing. User may not have seen previous data.)  Sensation: Impaired (Simultaneous filing. User may not have seen previous data.)  Range Of Motion:  AROM: Generally decreased, functional (Simultaneous filing. User may not have seen previous data.)  Functional Mobility:  Bed Mobility:   Supine to Sit: Contact guard assistance (Simultaneous filing. User may not have seen previous data.)  Sit to Supine: Contact guard assistance  Scooting: Contact guard assistance  Transfers:  Sit to Stand: Contact guard assistance (Simultaneous filing. User may not have seen previous data.)  Stand to Sit: Contact guard assistance (Simultaneous filing. User may not have seen previous data.)  Balance:   Sitting: Intact (Simultaneous filing. User may not have seen previous data.)  Standing: Intact; With support (Simultaneous filing. User may not have seen previous data.)  Ambulation/Gait Training:  Distance (ft): 30 Feet (ft)  Assistive Device: Gait belt;Walker, rolling  Ambulation - Level of Assistance: Contact guard assistance   Gait Description (WDL): Exceptions to WDL  Gait Abnormalities: Decreased step clearance; Antalgic; Step to gait   Left Side Weight Bearing: As tolerated  Base of Support: Shift to right  Stance: Left decreased  Speed/Yumi: Slow  Step Length: Right shortened;Left shortened  Pain:  Pain level pre-treatment: 5/10   Pain level post-treatment: 5/10   Pain Intervention(s) : Medication (see MAR); Rest, Ice, Repositioning  Response to intervention: Nurse notified, See doc flow    Activity Tolerance:   Good  Please refer to the flowsheet for vital signs taken during this treatment. After treatment:   []         Patient left in no apparent distress sitting up in chair  [x]         Patient left in no apparent distress in bed  [x]         Call bell left within reach  [x]         Nursing notified  []         Caregiver present  []         Bed alarm activated  []         SCDs applied    COMMUNICATION/EDUCATION:   [x]         Role of Physical Therapy in the acute care setting. [x]         Fall prevention education was provided and the patient/caregiver indicated understanding. [x]         Patient/family have participated as able in goal setting and plan of care. []         Patient/family agree to work toward stated goals and plan of care. []         Patient understands intent and goals of therapy, but is neutral about his/her participation. []         Patient is unable to participate in goal setting/plan of care: ongoing with therapy staff.  []         Other:     Thank you for this referral.  Jonathon Charles   Time Calculation: 31 mins      Eval Complexity: History: MEDIUM  Complexity : 1-2 comorbidities / personal factors will impact the outcome/ POC Exam:LOW Complexity : 1-2 Standardized tests and measures addressing body structure, function, activity limitation and / or participation in recreation  Presentation: LOW Complexity : Stable, uncomplicated  Clinical Decision Making:Low Complexity    Overall Complexity:LOW 2

## 2022-07-20 NOTE — PROGRESS NOTE ADULT - SUBJECTIVE AND OBJECTIVE BOX
Internal Medicine   Abeba Hafsa | PGY-1    OVERNIGHT EVENTS: No acute overnight events.    SUBJECTIVE:       MEDICATIONS  (STANDING):  albuterol/ipratropium for Nebulization 3 milliLiter(s) Nebulizer every 6 hours  chlorhexidine 4% Liquid 1 Application(s) Topical <User Schedule>  furosemide    Tablet 40 milliGRAM(s) Oral daily  glucagon  Injectable 1 milliGRAM(s) IntraMuscular once  heparin   Injectable 7500 Unit(s) SubCutaneous every 8 hours  insulin lispro (ADMELOG) corrective regimen sliding scale   SubCutaneous every 6 hours  insulin NPH human recombinant 30 Unit(s) SubCutaneous every 6 hours  lisinopril 10 milliGRAM(s) Oral daily  naloxegol 25 milliGRAM(s) Oral daily  NIFEdipine XL 60 milliGRAM(s) Oral daily  nystatin    Suspension 906940 Unit(s) Swish and Swallow daily  pantoprazole    Tablet 40 milliGRAM(s) Oral before breakfast  polyethylene glycol 3350 17 Gram(s) Oral every 12 hours  pregabalin 100 milliGRAM(s) Oral every 8 hours  senna Syrup 5 milliLiter(s) Oral at bedtime  sertraline 50 milliGRAM(s) Oral daily    MEDICATIONS  (PRN):  hydrALAZINE Injectable 10 milliGRAM(s) IV Push three times a day PRN SBP > 170  HYDROmorphone  Injectable 1 milliGRAM(s) IV Push every 4 hours PRN Severe Pain (7 - 10)  oxyCODONE    IR 10 milliGRAM(s) Oral every 4 hours PRN Moderate Pain (4 - 6)  oxyCODONE    IR 15 milliGRAM(s) Oral every 4 hours PRN Severe Pain (7 - 10)        T(F): 98.1 (07-20-22 @ 02:01), Max: 98.4 (07-19-22 @ 11:00)  HR: 74 (07-20-22 @ 06:10) (74 - 85)  BP: 161/59 (07-20-22 @ 06:10) (152/74 - 161/59)  BP(mean): --  RR: 18 (07-20-22 @ 06:10) (18 - 22)  SpO2: 97% (07-20-22 @ 06:10) (96% - 98%)    PHYSICAL EXAM:     GENERAL: NAD, lying in bed comfortably  HEAD:  Atraumatic, Normocephalic  EYES: EOMI, PERRLA, conjunctiva and sclera clear, no nystagmus noted  ENT: Moist mucous membranes,   NECK: Supple, No JVD, trachea midline  CHEST/LUNG: Clear to auscultation bilaterally; No rales, rhonchi, wheezing, or rubs. Unlabored respirations  HEART: Regular rate and rhythm; No murmurs, rubs, or gallops, normal S1/S2  ABDOMEN: normal bowel sounds; Soft, nontender, nondistended, no organomegaly   EXTREMITIES:  2+ Peripheral Pulses, brisk capillary refill. No clubbing, cyanosis, or edema  MSK: No gross deformities noted   Neurological:  A&Ox3, no focal deficits   SKIN: No rashes or lesions  PSYCH: Normal mood, affect     TELEMETRY:    LABS:                        10.0   12.04 )-----------( 688      ( 19 Jul 2022 11:35 )             33.5     07-19    142  |  105  |  14  ----------------------------<  192<H>  3.8   |  24  |  0.65    Ca    8.8      19 Jul 2022 11:33  Phos  2.6     07-19  Mg     2.3     07-19              Creatinine Trend: 0.65<--, 0.80<--, 0.82<--, 0.73<--, 0.97<--, 0.89<--  I&O's Summary    BNP    RADIOLOGY & ADDITIONAL STUDIES:             Internal Medicine   Abeba Pereyra | PGY-1    OVERNIGHT EVENTS: No acute overnight events.    SUBJECTIVE: Pt reports migraine-like headache last night behind the R eye. Pt reports feeling anxious after her HA and sleeping 3:00-5:30AM. Pt reports R lateral plantar foot tenderness on the similar to typical neuropathic pain. Pt reports broad chest pain. Pt reports clear nasal discharge. Pt reports back pain where her spinal cord stimulator was placed. Pt reports occasional SOB. Pt denies N/V/D.       MEDICATIONS  (STANDING):  albuterol/ipratropium for Nebulization 3 milliLiter(s) Nebulizer every 6 hours  chlorhexidine 4% Liquid 1 Application(s) Topical <User Schedule>  furosemide    Tablet 40 milliGRAM(s) Oral daily  glucagon  Injectable 1 milliGRAM(s) IntraMuscular once  heparin   Injectable 7500 Unit(s) SubCutaneous every 8 hours  insulin lispro (ADMELOG) corrective regimen sliding scale   SubCutaneous every 6 hours  insulin NPH human recombinant 30 Unit(s) SubCutaneous every 6 hours  lisinopril 10 milliGRAM(s) Oral daily  naloxegol 25 milliGRAM(s) Oral daily  NIFEdipine XL 60 milliGRAM(s) Oral daily  nystatin    Suspension 262165 Unit(s) Swish and Swallow daily  pantoprazole    Tablet 40 milliGRAM(s) Oral before breakfast  polyethylene glycol 3350 17 Gram(s) Oral every 12 hours  pregabalin 100 milliGRAM(s) Oral every 8 hours  senna Syrup 5 milliLiter(s) Oral at bedtime  sertraline 50 milliGRAM(s) Oral daily    MEDICATIONS  (PRN):  hydrALAZINE Injectable 10 milliGRAM(s) IV Push three times a day PRN SBP > 170  HYDROmorphone  Injectable 1 milliGRAM(s) IV Push every 4 hours PRN Severe Pain (7 - 10)  oxyCODONE    IR 10 milliGRAM(s) Oral every 4 hours PRN Moderate Pain (4 - 6)  oxyCODONE    IR 15 milliGRAM(s) Oral every 4 hours PRN Severe Pain (7 - 10)        T(F): 98.1 (07-20-22 @ 02:01), Max: 98.4 (07-19-22 @ 11:00)  HR: 74 (07-20-22 @ 06:10) (74 - 85)  BP: 161/59 (07-20-22 @ 06:10) (152/74 - 161/59)  BP(mean): --  RR: 18 (07-20-22 @ 06:10) (18 - 22)  SpO2: 97% (07-20-22 @ 06:10) (96% - 98%)    PHYSICAL EXAM:     GENERAL: NAD, lying in bed comfortably  HEAD:  Atraumatic, Normocephalic  EYES: EOMI, PERRLA, conjunctiva and sclera clear, no nystagmus noted  ENT: Moist mucous membranes,   NECK: Supple, No JVD, trachea midline  CHEST/LUNG: Clear to auscultation bilaterally; No rales, rhonchi, wheezing, or rubs. Unlabored respirations  HEART: Regular rate and rhythm; No murmurs, rubs, or gallops, normal S1/S2  ABDOMEN: normal bowel sounds; Soft, nontender, nondistended, no organomegaly   EXTREMITIES: brett 2+ pitting edema; R lateral aspect of plantar foot TTP; 2+ Peripheral Pulses, brisk capillary refill. No clubbing, cyanosis  MSK: No gross deformities noted   Neurological:  A&Ox3, no focal deficits   SKIN: +erythematous rash on R cheek  PSYCH: Normal mood, affect     TELEMETRY:    LABS:                        10.0   12.04 )-----------( 688      ( 19 Jul 2022 11:35 )             33.5     07-19    142  |  105  |  14  ----------------------------<  192<H>  3.8   |  24  |  0.65    Ca    8.8      19 Jul 2022 11:33  Phos  2.6     07-19  Mg     2.3     07-19              Creatinine Trend: 0.65<--, 0.80<--, 0.82<--, 0.73<--, 0.97<--, 0.89<--  I&O's Summary    BNP    RADIOLOGY & ADDITIONAL STUDIES:             Internal Medicine   Abeba Pereyra | PGY-1    OVERNIGHT EVENTS: No acute overnight events.    SUBJECTIVE: Pt reports migraine-like headache last night behind the R eye. Pt reports feeling anxious after her HA and sleeping 3:00-5:30AM. Pt reports R lateral plantar foot tenderness on the similar to typical neuropathic pain. Pt reports broad chest pain. Pt reports clear nasal discharge and occasional cough. Pt reports tolerating liquids and pudding well. Pt reports back pain where her spinal cord stimulator was placed. Pt reports occasional SOB. Pt denies N/V/D.       MEDICATIONS  (STANDING):  albuterol/ipratropium for Nebulization 3 milliLiter(s) Nebulizer every 6 hours  chlorhexidine 4% Liquid 1 Application(s) Topical <User Schedule>  furosemide    Tablet 40 milliGRAM(s) Oral daily  glucagon  Injectable 1 milliGRAM(s) IntraMuscular once  heparin   Injectable 7500 Unit(s) SubCutaneous every 8 hours  insulin lispro (ADMELOG) corrective regimen sliding scale   SubCutaneous every 6 hours  insulin NPH human recombinant 30 Unit(s) SubCutaneous every 6 hours  lisinopril 10 milliGRAM(s) Oral daily  naloxegol 25 milliGRAM(s) Oral daily  NIFEdipine XL 60 milliGRAM(s) Oral daily  nystatin    Suspension 637730 Unit(s) Swish and Swallow daily  pantoprazole    Tablet 40 milliGRAM(s) Oral before breakfast  polyethylene glycol 3350 17 Gram(s) Oral every 12 hours  pregabalin 100 milliGRAM(s) Oral every 8 hours  senna Syrup 5 milliLiter(s) Oral at bedtime  sertraline 50 milliGRAM(s) Oral daily    MEDICATIONS  (PRN):  hydrALAZINE Injectable 10 milliGRAM(s) IV Push three times a day PRN SBP > 170  HYDROmorphone  Injectable 1 milliGRAM(s) IV Push every 4 hours PRN Severe Pain (7 - 10)  oxyCODONE    IR 10 milliGRAM(s) Oral every 4 hours PRN Moderate Pain (4 - 6)  oxyCODONE    IR 15 milliGRAM(s) Oral every 4 hours PRN Severe Pain (7 - 10)        T(F): 98.1 (07-20-22 @ 02:01), Max: 98.4 (07-19-22 @ 11:00)  HR: 74 (07-20-22 @ 06:10) (74 - 85)  BP: 161/59 (07-20-22 @ 06:10) (152/74 - 161/59)  BP(mean): --  RR: 18 (07-20-22 @ 06:10) (18 - 22)  SpO2: 97% (07-20-22 @ 06:10) (96% - 98%)    PHYSICAL EXAM:     GENERAL: NAD, lying in bed comfortably  HEAD:  Atraumatic, Normocephalic  EYES: EOMI, PERRLA, conjunctiva and sclera clear, no nystagmus noted  ENT: Moist mucous membranes,   NECK: Supple, No JVD, trachea midline  CHEST/LUNG: Clear to auscultation bilaterally; No rales, rhonchi, wheezing, or rubs. Unlabored respirations  HEART: Regular rate and rhythm; No murmurs, rubs, or gallops, normal S1/S2  ABDOMEN: normal bowel sounds; Soft, nontender, nondistended, no organomegaly   EXTREMITIES: brett 2+ pitting edema; R lateral aspect of plantar foot TTP; 2+ Peripheral Pulses, brisk capillary refill. No clubbing, cyanosis  MSK: No gross deformities noted   Neurological:  A&Ox3, no focal deficits   SKIN: +erythematous rash on R cheek  PSYCH: Normal mood, affect     TELEMETRY:    LABS:                        10.0   12.04 )-----------( 688      ( 19 Jul 2022 11:35 )             33.5     07-19    142  |  105  |  14  ----------------------------<  192<H>  3.8   |  24  |  0.65    Ca    8.8      19 Jul 2022 11:33  Phos  2.6     07-19  Mg     2.3     07-19              Creatinine Trend: 0.65<--, 0.80<--, 0.82<--, 0.73<--, 0.97<--, 0.89<--  I&O's Summary    BNP    RADIOLOGY & ADDITIONAL STUDIES:             Internal Medicine   Abeba Pereyra | PGY-1    OVERNIGHT EVENTS: No acute overnight events.    SUBJECTIVE: Pt reports migraine-like headache last night behind the R eye. Pt reports feeling anxious after her HA and sleeping 3:00-5:30AM. Pt reports R lateral plantar foot tenderness on the similar to typical neuropathic pain. Pt reports broad chest pain. Pt reports clear nasal discharge and occasional cough. Pt reports tolerating liquids and pudding well. Pt reports back pain where her spinal cord stimulator was placed. Pt reports occasional SOB. Pt denies N/V/D.       MEDICATIONS  (STANDING):  albuterol/ipratropium for Nebulization 3 milliLiter(s) Nebulizer every 6 hours  chlorhexidine 4% Liquid 1 Application(s) Topical <User Schedule>  furosemide    Tablet 40 milliGRAM(s) Oral daily  glucagon  Injectable 1 milliGRAM(s) IntraMuscular once  heparin   Injectable 7500 Unit(s) SubCutaneous every 8 hours  insulin lispro (ADMELOG) corrective regimen sliding scale   SubCutaneous every 6 hours  insulin NPH human recombinant 30 Unit(s) SubCutaneous every 6 hours  lisinopril 10 milliGRAM(s) Oral daily  naloxegol 25 milliGRAM(s) Oral daily  NIFEdipine XL 60 milliGRAM(s) Oral daily  nystatin    Suspension 817503 Unit(s) Swish and Swallow daily  pantoprazole    Tablet 40 milliGRAM(s) Oral before breakfast  polyethylene glycol 3350 17 Gram(s) Oral every 12 hours  pregabalin 100 milliGRAM(s) Oral every 8 hours  senna Syrup 5 milliLiter(s) Oral at bedtime  sertraline 50 milliGRAM(s) Oral daily    MEDICATIONS  (PRN):  hydrALAZINE Injectable 10 milliGRAM(s) IV Push three times a day PRN SBP > 170  HYDROmorphone  Injectable 1 milliGRAM(s) IV Push every 4 hours PRN Severe Pain (7 - 10)  oxyCODONE    IR 10 milliGRAM(s) Oral every 4 hours PRN Moderate Pain (4 - 6)  oxyCODONE    IR 15 milliGRAM(s) Oral every 4 hours PRN Severe Pain (7 - 10)        T(F): 98.1 (07-20-22 @ 02:01), Max: 98.4 (07-19-22 @ 11:00)  HR: 74 (07-20-22 @ 06:10) (74 - 85)  BP: 161/59 (07-20-22 @ 06:10) (152/74 - 161/59)  BP(mean): --  RR: 18 (07-20-22 @ 06:10) (18 - 22)  SpO2: 97% (07-20-22 @ 06:10) (96% - 98%)    PHYSICAL EXAM:     GENERAL: NAD, lying in bed comfortably  HEAD:  Atraumatic, Normocephalic  EYES: EOMI, PERRLA, conjunctiva and sclera clear, no nystagmus noted  ENT: Moist mucous membranes,   NECK: Supple, No JVD, trachea midline  CHEST/LUNG: Clear to auscultation bilaterally; No rales, rhonchi, wheezing, or rubs. Unlabored respirations  HEART: Regular rate and rhythm; No murmurs, rubs, or gallops, normal S1/S2  ABDOMEN: normal bowel sounds; Soft, nontender, nondistended, no organomegaly   EXTREMITIES: brett 2+ pitting edema; R lateral aspect of plantar foot TTP; 2+ Peripheral Pulses, brisk capillary refill. No clubbing, cyanosis  MSK: No gross deformities noted   Neurological:  A&Ox3, no focal deficits   SKIN: +erythematous rash on R cheek  PSYCH: Normal mood, affect     TELEMETRY:    LABS:                        10.0   12.04 )-----------( 688      ( 19 Jul 2022 11:35 )             33.5     07-19    142  |  105  |  14  ----------------------------<  192<H>  3.8   |  24  |  0.65    Ca    8.8      19 Jul 2022 11:33  Phos  2.6     07-19  Mg     2.3     07-19              Creatinine Trend: 0.65<--, 0.80<--, 0.82<--, 0.73<--, 0.97<--, 0.89<--  I&O's Summary    BNP    RADIOLOGY & ADDITIONAL STUDIES:    US Head + Neck soft tissue:     FINDINGS:  Left Clavicular Region:  No evidence of discrete mass.      IMPRESSION:  No neck mass.

## 2022-07-20 NOTE — PROGRESS NOTE ADULT - PROBLEM SELECTOR PLAN 5
-  pt remains afebrile   - cont tylenol q6 PRN if respikes fever, LFTS mildly elevated, will cont to monitor   - Wean off 2L O2  - bcx negx2, sputum cx neg, UA negative   - RVP positive for hMPV, now off contact   - nystatin swish for oral thrush -  pt remains afebrile   - cont tylenol q6 PRN if respikes fever, LFTS mildly elevated, will cont to monitor   - Wean off 2L O2  - bcx negx2, sputum cx neg, UA negative   - RVP positive for hMPV, now off contact   - nystatin swish for oral thrush  - Wean O2 as tolerated by pt  - Incentive spirometry at bedside

## 2022-07-20 NOTE — PROGRESS NOTE ADULT - ATTENDING COMMENTS
51 y/o morbidly obese F w/ extensive medical history including pseudotumor cerebri and chronic back pain s/p recent placement of spinal nerve stimulator prior to admission admitted to MICU for acute hypoxemic respiratory failure secondary to likely bacterial PNA and hMPV infection.     1. Acute Hypoxemic and Hypercapnic Respiratory Failure - Resolved, extubated on 7/17  - 2/2 bacterial pneumonia and hMPV infection   - continue NIPPV QHS and PRN for now   - Maintain O2 sat > 90%  - Aspiration precautions  - Wean O2 as tolerated  - Start Mucinex today  - IS while awake  2. Oropharyngeal dysphagia s/p intubation.   - Swallow evaluation with FEES recommended regular diet with thin liquids  - Tolerating diet  3. Multifocal PNA - Concerning for aspiration PNA  - completed course of Zosyn  - Spinal stimulator leads removed during this stay  - hMPV- continue supportive care  - cheek lesion - likely related to proning  4 Essential hypertension   - c/w home antihypertensives   6. Chronic Pain   - Seen by Chronic pain, recommendations appreciated.   - c/w Lyrica, Oxycodone  - Neurosurgery followup noted  7. Anisocoria- Appears resolved  - CTH negative for acute pathology.   8.  DM II  Currently on NPH. Will likely need to be transitioned to basal bolus regimen now that will be started on diet    PT consult pending

## 2022-07-20 NOTE — PHYSICAL THERAPY INITIAL EVALUATION ADULT - PERTINENT HX OF CURRENT PROBLEM, REHAB EVAL
Pt is a 52 y.o. female with CRPS, MO, DM2, HTN, HTN, IBS, chronic back pain, pseudotumor cerebri, recent spinal nerve stimulator placed (7/8/22 @ [facility]) who initially presented with hypoxic respiratory failure, found to have multifocal pna 2/2 aspiration pneumonia now with increased work of breathing on bipap admitted to MICU. s/p intubation for hypoxic resp failure on bipap and with severe ARDS likely 2/2 Asp PNA. Now extubated and downgraded. (-) Neck/Head US 7/19/22. Continued below.

## 2022-07-20 NOTE — PHYSICAL THERAPY INITIAL EVALUATION ADULT - ADDITIONAL COMMENTS
Pt lives in a basement apt in a private house with 11-12 steps, +old HR as per pt. Pt has a HHA for 4 hrs on Mon/Wed and 5 hrs on Tues/Thurs. Pt used both a rollator and cane for ambulation PTA. +eyeglasses.

## 2022-07-20 NOTE — PROGRESS NOTE ADULT - PROBLEM SELECTOR PLAN 1
- Start Lyrica 100 mg every 8 hours (CrCl = 219.1)  - Mod pain: Start Oxycodone 10 mg every 4 hours PRN   - Severe pain: Oxycodone 15 mg Q4H PRN  - Warm/cool packs for comfort  - Bowel Regimen  - Incentive Spirometer per primary team  - PT referral  - Monitor for sedation, respiratory depression  - Narcan Rescue Kit on discharge (Naloxone 4 mg/0.1 ml nasal spray - 1 spray q 2-3 minutes alternating between nostrils) - Start Lyrica 100 mg every 8 hours (CrCl = 219.1)  - Mod pain: Start Oxycodone 10 mg every 4 hours PRN   - Severe pain: Oxycodone 15 mg Q4H PRN  - Warm/cool packs for comfort  - Bowel Regimen  - PT referral  - Monitor for sedation, respiratory depression  - Narcan Rescue Kit on discharge (Naloxone 4 mg/0.1 ml nasal spray - 1 spray q 2-3 minutes alternating between nostrils) - Start Lyrica 100 mg every 8 hours (CrCl = 219.1)  - Mod pain: Start Oxycodone 10 mg every 4 hours PRN   - Severe pain: Oxycodone 15 mg Q4H PRN  - Warm/cool packs for comfort  - Bowel Regimen  - PT referral  - Monitor for sedation, respiratory depression  - Narcan Rescue Kit on discharge (Naloxone 4 mg/0.1 ml nasal spray - 1 spray q 2-3 minutes alternating between nostrils)  - Educate pt regarding pain medication and elevated BP

## 2022-07-20 NOTE — PROGRESS NOTE ADULT - PROBLEM SELECTOR PLAN 4
- Restart home lasix 20mg IV (for home 40mg po), vasotec 1.25IV (for home lisinopril), labetolol 10mg q6 IV, hydralazine 10 prn TID for HTN

## 2022-07-21 ENCOUNTER — TRANSCRIPTION ENCOUNTER (OUTPATIENT)
Age: 52
End: 2022-07-21

## 2022-07-21 LAB
ALBUMIN SERPL ELPH-MCNC: 3.6 G/DL — SIGNIFICANT CHANGE UP (ref 3.3–5)
ALP SERPL-CCNC: 157 U/L — HIGH (ref 40–120)
ALT FLD-CCNC: 40 U/L — SIGNIFICANT CHANGE UP (ref 10–45)
ANION GAP SERPL CALC-SCNC: 13 MMOL/L — SIGNIFICANT CHANGE UP (ref 5–17)
AST SERPL-CCNC: 40 U/L — SIGNIFICANT CHANGE UP (ref 10–40)
BASOPHILS # BLD AUTO: 0.03 K/UL — SIGNIFICANT CHANGE UP (ref 0–0.2)
BASOPHILS NFR BLD AUTO: 0.3 % — SIGNIFICANT CHANGE UP (ref 0–2)
BILIRUB SERPL-MCNC: 0.5 MG/DL — SIGNIFICANT CHANGE UP (ref 0.2–1.2)
BUN SERPL-MCNC: 11 MG/DL — SIGNIFICANT CHANGE UP (ref 7–23)
CALCIUM SERPL-MCNC: 9 MG/DL — SIGNIFICANT CHANGE UP (ref 8.4–10.5)
CHLORIDE SERPL-SCNC: 100 MMOL/L — SIGNIFICANT CHANGE UP (ref 96–108)
CO2 SERPL-SCNC: 26 MMOL/L — SIGNIFICANT CHANGE UP (ref 22–31)
CREAT SERPL-MCNC: 0.95 MG/DL — SIGNIFICANT CHANGE UP (ref 0.5–1.3)
EGFR: 72 ML/MIN/1.73M2 — SIGNIFICANT CHANGE UP
EOSINOPHIL # BLD AUTO: 0.17 K/UL — SIGNIFICANT CHANGE UP (ref 0–0.5)
EOSINOPHIL NFR BLD AUTO: 1.6 % — SIGNIFICANT CHANGE UP (ref 0–6)
GLUCOSE BLDC GLUCOMTR-MCNC: 102 MG/DL — HIGH (ref 70–99)
GLUCOSE BLDC GLUCOMTR-MCNC: 126 MG/DL — HIGH (ref 70–99)
GLUCOSE BLDC GLUCOMTR-MCNC: 183 MG/DL — HIGH (ref 70–99)
GLUCOSE BLDC GLUCOMTR-MCNC: 201 MG/DL — HIGH (ref 70–99)
GLUCOSE BLDC GLUCOMTR-MCNC: 238 MG/DL — HIGH (ref 70–99)
GLUCOSE SERPL-MCNC: 214 MG/DL — HIGH (ref 70–99)
HCT VFR BLD CALC: 34.7 % — SIGNIFICANT CHANGE UP (ref 34.5–45)
HGB BLD-MCNC: 10.6 G/DL — LOW (ref 11.5–15.5)
IMM GRANULOCYTES NFR BLD AUTO: 0.6 % — SIGNIFICANT CHANGE UP (ref 0–1.5)
LYMPHOCYTES # BLD AUTO: 2.62 K/UL — SIGNIFICANT CHANGE UP (ref 1–3.3)
LYMPHOCYTES # BLD AUTO: 24.5 % — SIGNIFICANT CHANGE UP (ref 13–44)
MAGNESIUM SERPL-MCNC: 1.6 MG/DL — SIGNIFICANT CHANGE UP (ref 1.6–2.6)
MCHC RBC-ENTMCNC: 28.2 PG — SIGNIFICANT CHANGE UP (ref 27–34)
MCHC RBC-ENTMCNC: 30.5 GM/DL — LOW (ref 32–36)
MCV RBC AUTO: 92.3 FL — SIGNIFICANT CHANGE UP (ref 80–100)
MONOCYTES # BLD AUTO: 0.76 K/UL — SIGNIFICANT CHANGE UP (ref 0–0.9)
MONOCYTES NFR BLD AUTO: 7.1 % — SIGNIFICANT CHANGE UP (ref 2–14)
NEUTROPHILS # BLD AUTO: 7.05 K/UL — SIGNIFICANT CHANGE UP (ref 1.8–7.4)
NEUTROPHILS NFR BLD AUTO: 65.9 % — SIGNIFICANT CHANGE UP (ref 43–77)
NRBC # BLD: 0 /100 WBCS — SIGNIFICANT CHANGE UP (ref 0–0)
PHOSPHATE SERPL-MCNC: 3.3 MG/DL — SIGNIFICANT CHANGE UP (ref 2.5–4.5)
PLATELET # BLD AUTO: 729 K/UL — HIGH (ref 150–400)
POTASSIUM SERPL-MCNC: 3.3 MMOL/L — LOW (ref 3.5–5.3)
POTASSIUM SERPL-SCNC: 3.3 MMOL/L — LOW (ref 3.5–5.3)
PROT SERPL-MCNC: 8 G/DL — SIGNIFICANT CHANGE UP (ref 6–8.3)
RBC # BLD: 3.76 M/UL — LOW (ref 3.8–5.2)
RBC # FLD: 15.1 % — HIGH (ref 10.3–14.5)
SODIUM SERPL-SCNC: 139 MMOL/L — SIGNIFICANT CHANGE UP (ref 135–145)
WBC # BLD: 10.69 K/UL — HIGH (ref 3.8–10.5)
WBC # FLD AUTO: 10.69 K/UL — HIGH (ref 3.8–10.5)

## 2022-07-21 PROCEDURE — 99232 SBSQ HOSP IP/OBS MODERATE 35: CPT | Mod: GC

## 2022-07-21 RX ORDER — MUPIROCIN 20 MG/G
1 OINTMENT TOPICAL
Refills: 0 | Status: DISCONTINUED | OUTPATIENT
Start: 2022-07-21 | End: 2022-07-23

## 2022-07-21 RX ADMIN — HEPARIN SODIUM 7500 UNIT(S): 5000 INJECTION INTRAVENOUS; SUBCUTANEOUS at 22:03

## 2022-07-21 RX ADMIN — Medication 2: at 17:31

## 2022-07-21 RX ADMIN — Medication 40 MILLIGRAM(S): at 05:48

## 2022-07-21 RX ADMIN — Medication 1200 MILLIGRAM(S): at 17:55

## 2022-07-21 RX ADMIN — Medication 3 MILLILITER(S): at 17:54

## 2022-07-21 RX ADMIN — NALOXEGOL OXALATE 25 MILLIGRAM(S): 12.5 TABLET, FILM COATED ORAL at 12:42

## 2022-07-21 RX ADMIN — HUMAN INSULIN 30 UNIT(S): 100 INJECTION, SUSPENSION SUBCUTANEOUS at 00:22

## 2022-07-21 RX ADMIN — HYDROMORPHONE HYDROCHLORIDE 1 MILLIGRAM(S): 2 INJECTION INTRAMUSCULAR; INTRAVENOUS; SUBCUTANEOUS at 00:37

## 2022-07-21 RX ADMIN — HUMAN INSULIN 30 UNIT(S): 100 INJECTION, SUSPENSION SUBCUTANEOUS at 17:33

## 2022-07-21 RX ADMIN — Medication 100 MILLIGRAM(S): at 16:40

## 2022-07-21 RX ADMIN — Medication 3 MILLILITER(S): at 23:16

## 2022-07-21 RX ADMIN — Medication 100 MILLIGRAM(S): at 05:52

## 2022-07-21 RX ADMIN — OXYCODONE HYDROCHLORIDE 15 MILLIGRAM(S): 5 TABLET ORAL at 12:37

## 2022-07-21 RX ADMIN — SERTRALINE 50 MILLIGRAM(S): 25 TABLET, FILM COATED ORAL at 12:42

## 2022-07-21 RX ADMIN — OXYCODONE HYDROCHLORIDE 15 MILLIGRAM(S): 5 TABLET ORAL at 13:07

## 2022-07-21 RX ADMIN — HEPARIN SODIUM 7500 UNIT(S): 5000 INJECTION INTRAVENOUS; SUBCUTANEOUS at 14:53

## 2022-07-21 RX ADMIN — Medication 4: at 00:23

## 2022-07-21 RX ADMIN — Medication 3 MILLILITER(S): at 05:45

## 2022-07-21 RX ADMIN — Medication 100 MILLIGRAM(S): at 22:03

## 2022-07-21 RX ADMIN — CHLORHEXIDINE GLUCONATE 1 APPLICATION(S): 213 SOLUTION TOPICAL at 10:03

## 2022-07-21 RX ADMIN — SENNA PLUS 5 MILLILITER(S): 8.6 TABLET ORAL at 22:03

## 2022-07-21 RX ADMIN — Medication 1200 MILLIGRAM(S): at 05:52

## 2022-07-21 RX ADMIN — HUMAN INSULIN 30 UNIT(S): 100 INJECTION, SUSPENSION SUBCUTANEOUS at 12:25

## 2022-07-21 RX ADMIN — HEPARIN SODIUM 7500 UNIT(S): 5000 INJECTION INTRAVENOUS; SUBCUTANEOUS at 05:48

## 2022-07-21 RX ADMIN — HYDROMORPHONE HYDROCHLORIDE 1 MILLIGRAM(S): 2 INJECTION INTRAMUSCULAR; INTRAVENOUS; SUBCUTANEOUS at 23:30

## 2022-07-21 RX ADMIN — HYDROMORPHONE HYDROCHLORIDE 1 MILLIGRAM(S): 2 INJECTION INTRAMUSCULAR; INTRAVENOUS; SUBCUTANEOUS at 00:52

## 2022-07-21 RX ADMIN — Medication 4: at 12:26

## 2022-07-21 RX ADMIN — Medication 500000 UNIT(S): at 12:42

## 2022-07-21 RX ADMIN — PANTOPRAZOLE SODIUM 40 MILLIGRAM(S): 20 TABLET, DELAYED RELEASE ORAL at 05:47

## 2022-07-21 RX ADMIN — LISINOPRIL 10 MILLIGRAM(S): 2.5 TABLET ORAL at 05:48

## 2022-07-21 RX ADMIN — Medication 60 MILLIGRAM(S): at 05:47

## 2022-07-21 RX ADMIN — HYDROMORPHONE HYDROCHLORIDE 1 MILLIGRAM(S): 2 INJECTION INTRAMUSCULAR; INTRAVENOUS; SUBCUTANEOUS at 23:15

## 2022-07-21 RX ADMIN — Medication 3 MILLILITER(S): at 00:23

## 2022-07-21 RX ADMIN — Medication 3 MILLILITER(S): at 12:43

## 2022-07-21 RX ADMIN — Medication 1 TABLET(S): at 12:43

## 2022-07-21 RX ADMIN — POLYETHYLENE GLYCOL 3350 17 GRAM(S): 17 POWDER, FOR SOLUTION ORAL at 05:48

## 2022-07-21 NOTE — DISCHARGE NOTE PROVIDER - NSFOLLOWUPCLINICSTOKEN_GEN_ALL_ED_FT
611007:1 week|| ||00\01||False;886050:1 week|| ||00\01||False; 771660:1 week|| ||00\01||False;958138:1 week|| ||00\01||False;297927:1 week|| ||00\01||False;143807:1 week|| ||00\01||False;875571:1 week|| ||00\01||False;

## 2022-07-21 NOTE — PROGRESS NOTE ADULT - NUTRITIONAL ASSESSMENT
This patient has been assessed with a concern for Malnutrition and has been determined to have a diagnosis/diagnoses of Morbid obesity (BMI > 40).    This patient is being managed with:   Diet Consistent Carbohydrate w/Evening Snack-  DASH/TLC {Sodium & Cholesterol Restricted} (DASH)  Supplement Feeding Modality:  Oral  Glucerna Shake Cans or Servings Per Day:  2       Frequency:  Daily  Entered: Jul 20 2022  5:47PM

## 2022-07-21 NOTE — PROVIDER CONTACT NOTE (OTHER) - REASON
Pt's elevated temperature
- pt scheduled for 30units NPH insulin
Pt with high fever and respiratory rate
Pts

## 2022-07-21 NOTE — DISCHARGE NOTE PROVIDER - NSDCFUADDAPPT_GEN_ALL_CORE_FT
Podiatry Discharge Instructions:  Follow up: Please follow up with Dr. Wolfe within 1 week of discharge from the hospital, please call [phone] for appointment and discuss that you recently were seen in the hospital.  Wound Care: Please apply mupirocin to the right foot wound followed by 4x4 gauze and alison daily.   Weight bearing: Please weight bear as tolerated in a surgical shoe.  Antibiotics: Please continue as instructed.

## 2022-07-21 NOTE — CONSULT NOTE ADULT - SUBJECTIVE AND OBJECTIVE BOX
Podiatry pager #: [phone]    Patient is a 52y old  Female who presents with a chief complaint of sob, fever, cough (21 Jul 2022 07:02)      HPI:  52F c hx CRPS, MO, DM2, HTN, HTN, IBS, chronic back pain, pseudotumor cerebri, recent spinal nerve stimulator placed (7/8/22 @ [facility]), pw 2 days fevers, SOB, productive cough.    Pt reports having the neurostimulator placed at [facility] as an outpt procedure under conscious sedation, was observed, discharged home. That night at home, pt started to fever, SOB, productive cough. Pt states her symptoms have progressively gotten worse since then. Pt states she is having orthopnea and also can't lay flat 2/2 recent spinal stimulator. Reports b/l pleuritic rib pain. Denies urinary symptoms, diarrhea, abd pain. Pt ambulates with walker at baseline.   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;intercostal neuralgia&quot;)      Cardiac abnormality  (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;cardiac event&quot;; is unclear on diagnosis; denies hx/o MI)      Diabetes      Essential hypertension      IBS (irritable bowel syndrome)      Complex regional pain syndrome type 1      History of cholecystectomy      History of hand surgery  (pt states she had 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12)          MEDICATIONS  (STANDING):  albuterol/ipratropium for Nebulization 3 milliLiter(s) Nebulizer every 6 hours  chlorhexidine 4% Liquid 1 Application(s) Topical <User Schedule>  furosemide    Tablet 40 milliGRAM(s) Oral daily  glucagon  Injectable 1 milliGRAM(s) IntraMuscular once  guaiFENesin ER 1200 milliGRAM(s) Oral every 12 hours  heparin   Injectable 7500 Unit(s) SubCutaneous every 8 hours  insulin lispro (ADMELOG) corrective regimen sliding scale   SubCutaneous every 6 hours  insulin NPH human recombinant 30 Unit(s) SubCutaneous every 6 hours  lisinopril 10 milliGRAM(s) Oral daily  multivitamin 1 Tablet(s) Oral daily  naloxegol 25 milliGRAM(s) Oral daily  NIFEdipine XL 60 milliGRAM(s) Oral daily  nystatin    Suspension 822355 Unit(s) Swish and Swallow daily  pantoprazole    Tablet 40 milliGRAM(s) Oral before breakfast  polyethylene glycol 3350 17 Gram(s) Oral every 12 hours  pregabalin 100 milliGRAM(s) Oral every 8 hours  senna Syrup 5 milliLiter(s) Oral at bedtime  sertraline 50 milliGRAM(s) Oral daily    MEDICATIONS  (PRN):  hydrALAZINE Injectable 10 milliGRAM(s) IV Push three times a day PRN SBP > 170  HYDROmorphone  Injectable 1 milliGRAM(s) IV Push every 4 hours PRN Severe Pain (7 - 10)  oxyCODONE    IR 15 milliGRAM(s) Oral every 4 hours PRN Severe Pain (7 - 10)      Allergies    amitriptyline (Other)    Intolerances        VITALS:    Vital Signs Last 24 Hrs  T(C): 37.6 (21 Jul 2022 13:07), Max: 38 (21 Jul 2022 00:00)  T(F): 99.7 (21 Jul 2022 13:07), Max: 100.4 (21 Jul 2022 00:00)  HR: 89 (21 Jul 2022 13:07) (76 - 89)  BP: 131/64 (21 Jul 2022 13:07) (131/54 - 159/72)  BP(mean): --  RR: 18 (21 Jul 2022 13:07) (18 - 18)  SpO2: 93% (21 Jul 2022 13:07) (90% - 97%)    Parameters below as of 21 Jul 2022 00:00  Patient On (Oxygen Delivery Method): nasal cannula  O2 Flow (L/min): 2      LABS:                          10.6   10.69 )-----------( 729      ( 21 Jul 2022 11:13 )             34.7       07-21    139  |  100  |  11  ----------------------------<  214<H>  3.3<L>   |  26  |  0.95    Ca    9.0      21 Jul 2022 11:13  Phos  3.3     07-21  Mg     1.6     07-21    TPro  8.0  /  Alb  3.6  /  TBili  0.5  /  DBili  x   /  AST  40  /  ALT  40  /  AlkPhos  157<H>  07-21      CAPILLARY BLOOD GLUCOSE      POCT Blood Glucose.: 238 mg/dL (21 Jul 2022 12:00)  POCT Blood Glucose.: 126 mg/dL (21 Jul 2022 09:05)  POCT Blood Glucose.: 102 mg/dL (21 Jul 2022 05:36)  POCT Blood Glucose.: 201 mg/dL (21 Jul 2022 00:04)  POCT Blood Glucose.: 206 mg/dL (20 Jul 2022 21:13)  POCT Blood Glucose.: 134 mg/dL (20 Jul 2022 16:54)          LOWER EXTREMITY PHYSICAL EXAM:    Vasular: DP 2/4, PT nonpalpable 2/2 to edema B/L, CFT <3 seconds B/L, Temperature gradient warm to cool, B/L.   Neuro: Epicritic sensation intact to the level of midfoot, B/L.  Musculoskeletal/Ortho: unremarkable  Skin:   R foot submet 5 hyperkeratotic lesion with mild fluctuance, no periwound erythema, no probe to bone, no malodor, no drainage noted.   L foot dorsal 1st MPJ scab to dermis, no periwound erythema, no probe to bone, no malodor, no pus/drainage noted.       RADIOLOGY & ADDITIONAL STUDIES:

## 2022-07-21 NOTE — PROGRESS NOTE ADULT - ASSESSMENT
Fauzia Linares is a 52 year old with  CRPS, MO, DM2, HTN, HTN, IBS, chronic back pain, pseudotumor cerebri, recent spinal nerve stimulator placed (7/8/22 @ [facility]) who initially presented with hypoxic respiratory failure, found to have multifocal pna 2/2 aspiration pneumonia now with increased work of breathing on bipap admitted to MICU. s/p intubation for hypoxic resp failure on bipap and with severe ARDS likely 2/2 Asp PNA. Now extubated and downgraded.

## 2022-07-21 NOTE — DISCHARGE NOTE PROVIDER - NSDCFUSCHEDAPPT_GEN_ALL_CORE_FT
[facility]  [employer] [address]  Scheduled Appointment: 09/08/2022

## 2022-07-21 NOTE — DISCHARGE NOTE PROVIDER - HOSPITAL COURSE
52 female PMH CRPS, DM2, HTN, HTN, IBS, chronic back pain, pseudotumor cerebri, recent spinal nerve stimulator placed (7/8/22 @ [facility]), who presented on 7/10 with 2 days fevers, SOB, productive cough.    Pt reports having the neurostimulator placed at [facility] as an outpt procedure under conscious sedation, was observed, discharged home. That night at home, pt started to fever, SOB, productive cough. Pt states her symptoms have progressively gotten worse since then. Pt states she also can't lay flat 2/2 recent spinal stimulator. Reports b/l pleuritic chest pain when she coughs. Denies urinary symptoms, diarrhea, abd pain. Pt ambulates with walker at baseline. Pt was found to have significant multifocal pna on CT and admitted to medicine likely 2/2 aspiration PNA.    Since admission, pt SOB worsened on NRB requiring bipap. Pt fever also spiked to 104.1. MICU initially consulted for multifocal pna on Bipap, but at the time pt was not tachypnic. However, later pt became increasingly more tachypneic and reported feeling more tired on bipap 12/8/70% satting 89-90%. Pt was admitted to micu for hypoxic resp failure and increased WOB on bipap.   In the MICU, pt was subsequently intubated. Pt then developed severe ARDS 2/2 suspected asp pna. Bronch, bcx, ua neg. RVP was pos for HmnV. Spinal stimulator was removed. Pt was on max vent settings, placed on //pressors for vasoplegia 2/2 sedation, and was paralyzed, proned, which improved her course and was subsequently weaned off pressors and extubated to bipap on 7/17/22. Pt was hypertensive and agitated overnight. Pt's home BP meds were converted to IV and pt received ativan prn, which improved her HTN/agitation. Pt to finised course of zosyn 7/18/22. On 7/18/22 AM, pt found to have unequal pupils without focal deficits. CT head done was neg. Pt satting well on RA, extubated, and stepped down to medicine.     After stepping down from MICU, pt passed the swallow FEES assessment and was eventually put on a regular diet via nutrition consult. Pt reported generalized chest pain, facial abrasion cough with incontinence, brett foot ulcers  PT was consulted and after assessment, pt will be discharged to rehab facility. Chronic pain was consulted for pt's chronic back pain and pt is currently on Lyrica 150 mg BID, percocet 10 mg BID and will continue to follow-up with outpt provider Dr. Vinny Maldonado. Narcan Rescue Kit given on discharge. Podiatry was consulted for brett foot wounds and received aseptic excisional debridement of brett foot lesions with serous drainage from R foot and underlying wound granular and to subQ and L foot wound to dermis. Mupirocin ordered for R foot. Pt will follow-up with podiatry outpt after discharge. Pt is medically stable on day of discharge.

## 2022-07-21 NOTE — PROGRESS NOTE ADULT - ATTENDING COMMENTS
53 y/o morbidly obese F w/ extensive medical history including pseudotumor cerebri and chronic back pain s/p recent placement of spinal nerve stimulator prior to admission admitted to MICU for acute hypoxemic respiratory failure secondary to likely bacterial PNA and hMPV infection.     1. Acute Hypoxemic and Hypercapnic Respiratory Failure - Resolved, extubated on 7/17  - 2/2 bacterial pneumonia and hMPV infection   - continue NIPPV QHS  - Aspiration precautions  - Wean O2 as tolerated  - c/w Mucinex   - IS while awake  2. Oropharyngeal dysphagia s/p intubation.   - Swallow evaluation with FEES recommended regular diet with thin liquids  - Tolerating diet  3. Multifocal PNA - Concerning for aspiration PNA  - completed course of Zosyn  - Spinal stimulator leads removed during this stay  - hMPV- continue supportive care  - cheek lesion - likely related to proning  4 Essential hypertension   - c/w home antihypertensives   6. Chronic Pain   - Seen by Chronic pain, recommendations appreciated.   - c/w Lyrica, Oxycodone  - Neurosurgery followup noted  7. Anisocoria- Appears resolved  - CTH negative for acute pathology.   8.  DM II  Currently on NPH. Will likely need to be transitioned to basal bolus regimen now that on diet  9. Fever  - Was febrile overnight. Is nontoxic appearing. No cultures were drawn at time of fever. Will defer cultures for now as would likely be low yield at this times without elevated temperature. Should pt spike would perform fever workup   10. Toe lesions  - Will have podiatry evaluate    PT consult recommending JAI

## 2022-07-21 NOTE — PROVIDER CONTACT NOTE (OTHER) - ACTION/TREATMENT ORDERED:
MD will order tylenol
MD explained pt must be experiencing pain plus high fever which might cause Increase in RR. MD ordered to give IV toradol plus  Tylenol in addition to percocet already given.
OK to hold NPH dose at this time for FS of 213 and current NPO status
Push NPH insulin dose to 8am.

## 2022-07-21 NOTE — DISCHARGE NOTE PROVIDER - NSFOLLOWUPCLINICS_GEN_ALL_ED_FT
[facility] - Primary Care  Primary Care  [address] [place] [address]  Phone: [phone]  Fax:   Follow Up Time: 1 week    [facility]  Psychiatry  [address] [place] [address]  Phone: [phone]  Fax:   Follow Up Time: 1 week     [facility] - Primary Care  Primary Care  [address] [place] [address]  Phone: [phone]  Fax:   Follow Up Time: 1 week    [facility]  Psychiatry  [address] [place] [address]  Phone: [phone]  Fax:   Follow Up Time: 1 week    [facility]  Endocrinology  [address] [place] [address]  Phone: [phone]  Fax:   Follow Up Time: 1 week    [facility]  Pulmonology  [address] [place] [address], [address]  [place] [address]  Phone: [phone]  Fax:   Follow Up Time: 1 week    [facility]  Podiatry/Wound Care  [address] [place] [address]  Phone: [phone]  Fax: [phone]  Follow Up Time: 1 week

## 2022-07-21 NOTE — PROGRESS NOTE ADULT - PROBLEM SELECTOR PLAN 4
- Restart home lasix 20mg IV (for home 40mg po), vasotec 1.25IV (for home lisinopril), labetolol 10mg q6 IV, hydralazine 10 prn TID for HTN - c/w home lasix 40mg, lisinopril and norvasc

## 2022-07-21 NOTE — PROGRESS NOTE ADULT - PROBLEM SELECTOR PLAN 5
-  pt remains afebrile   - cont tylenol q6 PRN if respikes fever, LFTS mildly elevated, will cont to monitor   - Wean off 2L O2  - bcx negx2, sputum cx neg, UA negative   - RVP positive for hMPV, now off contact   - nystatin swish for oral thrush  - Wean O2 as tolerated by pt  - Incentive spirometry at bedside

## 2022-07-21 NOTE — PROGRESS NOTE ADULT - SUBJECTIVE AND OBJECTIVE BOX
Internal Medicine   Abeba Hafas | PGY-1    OVERNIGHT EVENTS: No acute overnight events.    SUBJECTIVE:       MEDICATIONS  (STANDING):  albuterol/ipratropium for Nebulization 3 milliLiter(s) Nebulizer every 6 hours  chlorhexidine 4% Liquid 1 Application(s) Topical <User Schedule>  furosemide    Tablet 40 milliGRAM(s) Oral daily  glucagon  Injectable 1 milliGRAM(s) IntraMuscular once  guaiFENesin ER 1200 milliGRAM(s) Oral every 12 hours  heparin   Injectable 7500 Unit(s) SubCutaneous every 8 hours  insulin lispro (ADMELOG) corrective regimen sliding scale   SubCutaneous every 6 hours  insulin NPH human recombinant 30 Unit(s) SubCutaneous every 6 hours  lisinopril 10 milliGRAM(s) Oral daily  multivitamin 1 Tablet(s) Oral daily  naloxegol 25 milliGRAM(s) Oral daily  NIFEdipine XL 60 milliGRAM(s) Oral daily  nystatin    Suspension 884134 Unit(s) Swish and Swallow daily  pantoprazole    Tablet 40 milliGRAM(s) Oral before breakfast  polyethylene glycol 3350 17 Gram(s) Oral every 12 hours  pregabalin 100 milliGRAM(s) Oral every 8 hours  senna Syrup 5 milliLiter(s) Oral at bedtime  sertraline 50 milliGRAM(s) Oral daily    MEDICATIONS  (PRN):  hydrALAZINE Injectable 10 milliGRAM(s) IV Push three times a day PRN SBP > 170  HYDROmorphone  Injectable 1 milliGRAM(s) IV Push every 4 hours PRN Severe Pain (7 - 10)  oxyCODONE    IR 15 milliGRAM(s) Oral every 4 hours PRN Severe Pain (7 - 10)        T(F): 98.4 (07-21-22 @ 00:15), Max: 100.4 (07-21-22 @ 00:00)  HR: 81 (07-21-22 @ 06:28) (74 - 86)  BP: 159/72 (07-21-22 @ 05:49) (131/54 - 159/72)  BP(mean): --  RR: 18 (07-21-22 @ 00:00) (18 - 18)  SpO2: 97% (07-21-22 @ 06:28) (90% - 97%)    PHYSICAL EXAM:     GENERAL: NAD, lying in bed comfortably  HEAD:  Atraumatic, Normocephalic  EYES: EOMI, PERRLA, conjunctiva and sclera clear, no nystagmus noted  ENT: Moist mucous membranes,   NECK: Supple, No JVD, trachea midline  CHEST/LUNG: Clear to auscultation bilaterally; No rales, rhonchi, wheezing, or rubs. Unlabored respirations  HEART: Regular rate and rhythm; No murmurs, rubs, or gallops, normal S1/S2  ABDOMEN: normal bowel sounds; Soft, nontender, nondistended, no organomegaly   EXTREMITIES:  2+ Peripheral Pulses, brisk capillary refill. No clubbing, cyanosis, or edema  MSK: No gross deformities noted   Neurological:  A&Ox3, no focal deficits   SKIN: No rashes or lesions  PSYCH: Normal mood, affect     TELEMETRY:    LABS:                        10.1   9.82  )-----------( 681      ( 20 Jul 2022 10:17 )             32.9     07-20    143  |  103  |  11  ----------------------------<  170<H>  3.1<L>   |  24  |  0.77    Ca    9.1      20 Jul 2022 10:16  Phos  3.0     07-20  Mg     1.8     07-20    TPro  7.9  /  Alb  3.6  /  TBili  0.6  /  DBili  x   /  AST  30  /  ALT  35  /  AlkPhos  171<H>  07-20            Creatinine Trend: 0.77<--, 0.65<--, 0.80<--, 0.82<--, 0.73<--, 0.97<--  I&O's Summary    20 Jul 2022 07:01  -  21 Jul 2022 07:00  --------------------------------------------------------  IN: 0 mL / OUT: 1500 mL / NET: -1500 mL      BNP    RADIOLOGY & ADDITIONAL STUDIES:             Internal Medicine   Abeba Pereyra | PGY-1    OVERNIGHT EVENTS: No acute overnight events.    SUBJECTIVE: Pt seen laying at bedside. Pt reports concern for sore on plantar aspect of R foot. Pt denies having BM last night, N/V/D. Pt reports improvement in HA from yesterday. Pt reports urination with cough. Pt reports slight difficulty with swallowing, SOB when talking. Pt reports soreness on R lateral aspect of plantar foot. Pt also reports painless sore on L MTP. Pt reports episode of fever during the night, but denies any further episodes.       MEDICATIONS  (STANDING):  albuterol/ipratropium for Nebulization 3 milliLiter(s) Nebulizer every 6 hours  chlorhexidine 4% Liquid 1 Application(s) Topical <User Schedule>  furosemide    Tablet 40 milliGRAM(s) Oral daily  glucagon  Injectable 1 milliGRAM(s) IntraMuscular once  guaiFENesin ER 1200 milliGRAM(s) Oral every 12 hours  heparin   Injectable 7500 Unit(s) SubCutaneous every 8 hours  insulin lispro (ADMELOG) corrective regimen sliding scale   SubCutaneous every 6 hours  insulin NPH human recombinant 30 Unit(s) SubCutaneous every 6 hours  lisinopril 10 milliGRAM(s) Oral daily  multivitamin 1 Tablet(s) Oral daily  naloxegol 25 milliGRAM(s) Oral daily  NIFEdipine XL 60 milliGRAM(s) Oral daily  nystatin    Suspension 578857 Unit(s) Swish and Swallow daily  pantoprazole    Tablet 40 milliGRAM(s) Oral before breakfast  polyethylene glycol 3350 17 Gram(s) Oral every 12 hours  pregabalin 100 milliGRAM(s) Oral every 8 hours  senna Syrup 5 milliLiter(s) Oral at bedtime  sertraline 50 milliGRAM(s) Oral daily    MEDICATIONS  (PRN):  hydrALAZINE Injectable 10 milliGRAM(s) IV Push three times a day PRN SBP > 170  HYDROmorphone  Injectable 1 milliGRAM(s) IV Push every 4 hours PRN Severe Pain (7 - 10)  oxyCODONE    IR 15 milliGRAM(s) Oral every 4 hours PRN Severe Pain (7 - 10)        T(F): 98.4 (07-21-22 @ 00:15), Max: 100.4 (07-21-22 @ 00:00)  HR: 81 (07-21-22 @ 06:28) (74 - 86)  BP: 159/72 (07-21-22 @ 05:49) (131/54 - 159/72)  BP(mean): --  RR: 18 (07-21-22 @ 00:00) (18 - 18)  SpO2: 97% (07-21-22 @ 06:28) (90% - 97%)    PHYSICAL EXAM:     GENERAL: NAD, lying in bed comfortably  HEAD:  Atraumatic, Normocephalic  EYES: EOMI, PERRLA, conjunctiva and sclera clear, no nystagmus noted  ENT: Moist mucous membranes,   NECK: Supple, No JVD, trachea midline  CHEST/LUNG: Clear to auscultation bilaterally; No rales, rhonchi, wheezing, or rubs. Unlabored respirations  HEART: Regular rate and rhythm; No murmurs, rubs, or gallops, normal S1/S2  ABDOMEN: normal bowel sounds; Soft, nontender, nondistended, no organomegaly   EXTREMITIES:  2+ Peripheral Pulses, brisk capillary refill. No clubbing, cyanosis, or edema  MSK: No gross deformities noted   Neurological:  A&Ox3, no focal deficits   SKIN: No rashes or lesions  PSYCH: Normal mood, affect     TELEMETRY:    LABS:                        10.1   9.82  )-----------( 681      ( 20 Jul 2022 10:17 )             32.9     07-20    143  |  103  |  11  ----------------------------<  170<H>  3.1<L>   |  24  |  0.77    Ca    9.1      20 Jul 2022 10:16  Phos  3.0     07-20  Mg     1.8     07-20    TPro  7.9  /  Alb  3.6  /  TBili  0.6  /  DBili  x   /  AST  30  /  ALT  35  /  AlkPhos  171<H>  07-20            Creatinine Trend: 0.77<--, 0.65<--, 0.80<--, 0.82<--, 0.73<--, 0.97<--  I&O's Summary    20 Jul 2022 07:01  -  21 Jul 2022 07:00  --------------------------------------------------------  IN: 0 mL / OUT: 1500 mL / NET: -1500 mL      BNP    RADIOLOGY & ADDITIONAL STUDIES:             Internal Medicine   Abeba Pereyra | PGY-1    OVERNIGHT EVENTS: No acute overnight events.    SUBJECTIVE: Pt seen laying at bedside. Pt reports concern for sore on plantar aspect of R foot. Pt denies having BM last night, N/V/D. Pt reports improvement in HA from yesterday. Pt reports urination with cough. Pt reports slight difficulty with swallowing, SOB when talking. Pt reports soreness on R lateral aspect of plantar foot. Pt also reports painless sore on L MTP. Pt reports episode of fever during the night, but denies any further episodes.       MEDICATIONS  (STANDING):  albuterol/ipratropium for Nebulization 3 milliLiter(s) Nebulizer every 6 hours  chlorhexidine 4% Liquid 1 Application(s) Topical <User Schedule>  furosemide    Tablet 40 milliGRAM(s) Oral daily  glucagon  Injectable 1 milliGRAM(s) IntraMuscular once  guaiFENesin ER 1200 milliGRAM(s) Oral every 12 hours  heparin   Injectable 7500 Unit(s) SubCutaneous every 8 hours  insulin lispro (ADMELOG) corrective regimen sliding scale   SubCutaneous every 6 hours  insulin NPH human recombinant 30 Unit(s) SubCutaneous every 6 hours  lisinopril 10 milliGRAM(s) Oral daily  multivitamin 1 Tablet(s) Oral daily  naloxegol 25 milliGRAM(s) Oral daily  NIFEdipine XL 60 milliGRAM(s) Oral daily  nystatin    Suspension 645842 Unit(s) Swish and Swallow daily  pantoprazole    Tablet 40 milliGRAM(s) Oral before breakfast  polyethylene glycol 3350 17 Gram(s) Oral every 12 hours  pregabalin 100 milliGRAM(s) Oral every 8 hours  senna Syrup 5 milliLiter(s) Oral at bedtime  sertraline 50 milliGRAM(s) Oral daily    MEDICATIONS  (PRN):  hydrALAZINE Injectable 10 milliGRAM(s) IV Push three times a day PRN SBP > 170  HYDROmorphone  Injectable 1 milliGRAM(s) IV Push every 4 hours PRN Severe Pain (7 - 10)  oxyCODONE    IR 15 milliGRAM(s) Oral every 4 hours PRN Severe Pain (7 - 10)        T(F): 98.4 (07-21-22 @ 00:15), Max: 100.4 (07-21-22 @ 00:00)  HR: 81 (07-21-22 @ 06:28) (74 - 86)  BP: 159/72 (07-21-22 @ 05:49) (131/54 - 159/72)  BP(mean): --  RR: 18 (07-21-22 @ 00:00) (18 - 18)  SpO2: 97% (07-21-22 @ 06:28) (90% - 97%)    PHYSICAL EXAM:     GENERAL: NAD, lying in bed comfortably  HEAD:  Atraumatic, Normocephalic  EYES: EOMI, PERRLA, conjunctiva and sclera clear, no nystagmus noted  ENT: Moist mucous membranes,   NECK: Supple, No JVD, trachea midline  CHEST/LUNG: Clear to auscultation bilaterally; No rales, rhonchi, wheezing, or rubs. Unlabored respirations  HEART: Regular rate and rhythm; No murmurs, rubs, or gallops, normal S1/S2  ABDOMEN: normal bowel sounds; Soft, nontender, nondistended, no organomegaly   EXTREMITIES: +sore on R lateral plantar aspect of foot; +sore on L medial  2+ Peripheral Pulses, brisk capillary refill. No clubbing, cyanosis, or edema  MSK: No gross deformities noted   Neurological:  A&Ox3, no focal deficits   SKIN: No rashes or lesions  PSYCH: Normal mood, affect     TELEMETRY:    LABS:                        10.1   9.82  )-----------( 681      ( 20 Jul 2022 10:17 )             32.9     07-20    143  |  103  |  11  ----------------------------<  170<H>  3.1<L>   |  24  |  0.77    Ca    9.1      20 Jul 2022 10:16  Phos  3.0     07-20  Mg     1.8     07-20    TPro  7.9  /  Alb  3.6  /  TBili  0.6  /  DBili  x   /  AST  30  /  ALT  35  /  AlkPhos  171<H>  07-20            Creatinine Trend: 0.77<--, 0.65<--, 0.80<--, 0.82<--, 0.73<--, 0.97<--  I&O's Summary    20 Jul 2022 07:01  -  21 Jul 2022 07:00  --------------------------------------------------------  IN: 0 mL / OUT: 1500 mL / NET: -1500 mL      BNP    RADIOLOGY & ADDITIONAL STUDIES:

## 2022-07-21 NOTE — CONSULT NOTE ADULT - CONSULT REQUESTED DATE/TIME
11-Jul-2022 02:27
11-Jul-2022 12:49
14-Jul-2022 10:30
11-Jul-2022 13:22
18-Jul-2022 15:28
21-Jul-2022 13:58

## 2022-07-21 NOTE — CONSULT NOTE ADULT - REASON FOR ADMISSION
sob, fever, cough

## 2022-07-21 NOTE — DISCHARGE NOTE PROVIDER - NSDCMRMEDTOKEN_GEN_ALL_CORE_FT
acetaminophen-oxyCODONE 325 mg-5 mg oral tablet: 1 tab(s) orally once a day, As Needed  dicyclomine 10 mg oral capsule: 1 cap(s) orally once a day MDD:1 cap  Imodium 2 mg oral capsule: 1 cap(s) orally every 4 hours, As Needed  Lasix 40 mg oral tablet: 1 tab(s) orally once a day (in the morning)  lisinopril 10 mg oral tablet: 1 tab(s) orally once a day  NIFEdipine 60 mg oral tablet, extended release: 1 tab(s) orally once a day  NovoLOG 100 units/mL injectable solution: 10 unit(s) subcutaneous 3 times a day (with meals)  ondansetron 4 mg oral tablet, disintegratin tab(s) orally every 8 hours as needed for nausea/vomiting  Protonix 40 mg oral delayed release tablet: 1 tab(s) orally once a day (in the morning)  simethicone 80 mg oral tablet, chewable: 1 tab(s) orally 3 times a day after meals and at bedtime as needed for Gas  Tresiba 100 units/mL subcutaneous solution: 80 unit(s) subcutaneous every 72 hours  Zoloft 50 mg oral tablet: 1 tab(s) orally once a day (in the morning)   acetaminophen-oxyCODONE 325 mg-5 mg oral tablet: 1 tab(s) orally once a day, As Needed  dicyclomine 10 mg oral capsule: 1 cap(s) orally once a day MDD:1 cap  lisinopril 10 mg oral tablet: 1 tab(s) orally once a day  NovoLOG 100 units/mL injectable solution: 10 unit(s) subcutaneous 3 times a day (with meals)  simethicone 80 mg oral tablet, chewable: 1 tab(s) orally 3 times a day after meals and at bedtime as needed for Gas  Tresiba 100 units/mL subcutaneous solution: 80 unit(s) subcutaneous every 72 hours   benzonatate 100 mg oral capsule: 1 cap(s) orally 3 times a day  furosemide 40 mg oral tablet: 1 tab(s) orally once a day  guaiFENesin 1200 mg oral tablet, extended release: 1 tab(s) orally every 12 hours  lisinopril 10 mg oral tablet: 1 tab(s) orally once a day  mupirocin 2% topical ointment: 1 application topically 2 times a day  naloxegol 25 mg oral tablet: 1 tab(s) orally once a day  NIFEdipine 60 mg oral tablet, extended release: 1 tab(s) orally once a day  NovoLOG 100 units/mL injectable solution: 10 unit(s) subcutaneous 3 times a day (with meals)  oxyCODONE 15 mg oral tablet: 1 tab(s) orally every 4 hours, As needed, Severe Pain (7 - 10)  pantoprazole 40 mg oral delayed release tablet: 1 tab(s) orally once a day (before a meal)  pregabalin 100 mg oral capsule: 1 cap(s) orally every 8 hours  sertraline 50 mg oral tablet: 1 tab(s) orally once a day  simethicone 80 mg oral tablet, chewable: 1 tab(s) orally 3 times a day after meals and at bedtime as needed for Gas  Tresiba 100 units/mL subcutaneous solution: 80 unit(s) subcutaneous every 72 hours

## 2022-07-21 NOTE — PROVIDER CONTACT NOTE (OTHER) - SITUATION
Pt's temperature is 103 rectally.
Pt with fever of 104.1, RR 41, O2 92% on bipap
- pt scheduled for 30units NPH insulin
Pts

## 2022-07-21 NOTE — DISCHARGE NOTE PROVIDER - NSDCHC_MEDRECSTATUS_GEN_ALL_CORE
Admission Reconciliation is Completed  Discharge Reconciliation is Not Complete Admission Reconciliation is Completed  Discharge Reconciliation is Completed [facility]

## 2022-07-21 NOTE — CONSULT NOTE ADULT - ASSESSMENT
52F with bilateral foot wounds   - Patient seen and evaluated   - Afebrile, WBC 10.69  - R foot submet 5 hyperkeratotic lesion with mild fluctuance, no periwound erythema, no probe to bone, no malodor, no drainage noted. L foot dorsal 1st MPJ scab to dermis, no periwound erythema, no probe to bone, no malodor, no pus/drainage noted.   - Aseptic excisional debridement of bilateral foot lesions with sterile #15 blade, with serous drainage expressed from R foot and underlying wound granular and to subQ and left foot wound to dermis. Cleansed the R foot wound with sterile saline and applied DSD.   - No wound culture taken as no signs of infection  - Ordered mupirocin for R foot   - Recommend mupirocin to R foot wound followed by DSD   - Recommend offloading bilateral feet with zflows   - Patient is stable for discharge from podiatry standpoint   - F/u information and instructions in the f/u section of d/c provider note   - Seen with attending

## 2022-07-21 NOTE — PROGRESS NOTE ADULT - PROBLEM SELECTOR PLAN 1
- Start Lyrica 100 mg every 8 hours (CrCl = 219.1)  - Mod pain: Start Oxycodone 10 mg every 4 hours PRN   - Severe pain: Oxycodone 15 mg Q4H PRN  - Warm/cool packs for comfort  - Bowel Regimen  - PT referral  - Monitor for sedation, respiratory depression  - Narcan Rescue Kit on discharge (Naloxone 4 mg/0.1 ml nasal spray - 1 spray q 2-3 minutes alternating between nostrils)  - Educate pt regarding pain medication and elevated BP - Start Lyrica 100 mg every 8 hours (CrCl = 219.1)  - Mod pain: Start Oxycodone 10 mg every 4 hours PRN   - Severe pain: Oxycodone 15 mg Q4H PRN  - Warm/cool packs for comfort  - Bowel Regimen  - PT referral  - Monitor for sedation, respiratory depression  - Narcan Rescue Kit on discharge (Naloxone 4 mg/0.1 ml nasal spray - 1 spray q 2-3 minutes alternating between nostrils)  - Educated pt regarding pain medication and elevated BP and encourage pt to take pain medication regularly

## 2022-07-21 NOTE — DISCHARGE NOTE PROVIDER - DETAILS OF MALNUTRITION DIAGNOSIS/DIAGNOSES
This patient has been assessed with a concern for Malnutrition and was treated during this hospitalization for the following Nutrition diagnosis/diagnoses:     -  07/12/2022: Morbid obesity (BMI > 40)

## 2022-07-22 DIAGNOSIS — S00.81XA ABRASION OF OTHER PART OF HEAD, INITIAL ENCOUNTER: ICD-10-CM

## 2022-07-22 DIAGNOSIS — E11.621 TYPE 2 DIABETES MELLITUS WITH FOOT ULCER: ICD-10-CM

## 2022-07-22 LAB
ALBUMIN SERPL ELPH-MCNC: 3.2 G/DL — LOW (ref 3.3–5)
ALP SERPL-CCNC: 139 U/L — HIGH (ref 40–120)
ALT FLD-CCNC: 42 U/L — SIGNIFICANT CHANGE UP (ref 10–45)
ANION GAP SERPL CALC-SCNC: 11 MMOL/L — SIGNIFICANT CHANGE UP (ref 5–17)
AST SERPL-CCNC: 36 U/L — SIGNIFICANT CHANGE UP (ref 10–40)
BASOPHILS # BLD AUTO: 0.06 K/UL — SIGNIFICANT CHANGE UP (ref 0–0.2)
BASOPHILS NFR BLD AUTO: 0.6 % — SIGNIFICANT CHANGE UP (ref 0–2)
BILIRUB SERPL-MCNC: 0.4 MG/DL — SIGNIFICANT CHANGE UP (ref 0.2–1.2)
BUN SERPL-MCNC: 14 MG/DL — SIGNIFICANT CHANGE UP (ref 7–23)
CALCIUM SERPL-MCNC: 8.9 MG/DL — SIGNIFICANT CHANGE UP (ref 8.4–10.5)
CHLORIDE SERPL-SCNC: 100 MMOL/L — SIGNIFICANT CHANGE UP (ref 96–108)
CO2 SERPL-SCNC: 26 MMOL/L — SIGNIFICANT CHANGE UP (ref 22–31)
CREAT SERPL-MCNC: 0.85 MG/DL — SIGNIFICANT CHANGE UP (ref 0.5–1.3)
EGFR: 82 ML/MIN/1.73M2 — SIGNIFICANT CHANGE UP
EOSINOPHIL # BLD AUTO: 0.19 K/UL — SIGNIFICANT CHANGE UP (ref 0–0.5)
EOSINOPHIL NFR BLD AUTO: 2 % — SIGNIFICANT CHANGE UP (ref 0–6)
GLUCOSE BLDC GLUCOMTR-MCNC: 168 MG/DL — HIGH (ref 70–99)
GLUCOSE BLDC GLUCOMTR-MCNC: 176 MG/DL — HIGH (ref 70–99)
GLUCOSE BLDC GLUCOMTR-MCNC: 205 MG/DL — HIGH (ref 70–99)
GLUCOSE BLDC GLUCOMTR-MCNC: 220 MG/DL — HIGH (ref 70–99)
GLUCOSE BLDC GLUCOMTR-MCNC: 224 MG/DL — HIGH (ref 70–99)
GLUCOSE BLDC GLUCOMTR-MCNC: 246 MG/DL — HIGH (ref 70–99)
GLUCOSE SERPL-MCNC: 181 MG/DL — HIGH (ref 70–99)
HCT VFR BLD CALC: 32.1 % — LOW (ref 34.5–45)
HGB BLD-MCNC: 9.7 G/DL — LOW (ref 11.5–15.5)
IMM GRANULOCYTES NFR BLD AUTO: 0.5 % — SIGNIFICANT CHANGE UP (ref 0–1.5)
LYMPHOCYTES # BLD AUTO: 3.14 K/UL — SIGNIFICANT CHANGE UP (ref 1–3.3)
LYMPHOCYTES # BLD AUTO: 33.4 % — SIGNIFICANT CHANGE UP (ref 13–44)
MAGNESIUM SERPL-MCNC: 1.6 MG/DL — SIGNIFICANT CHANGE UP (ref 1.6–2.6)
MCHC RBC-ENTMCNC: 28.2 PG — SIGNIFICANT CHANGE UP (ref 27–34)
MCHC RBC-ENTMCNC: 30.2 GM/DL — LOW (ref 32–36)
MCV RBC AUTO: 93.3 FL — SIGNIFICANT CHANGE UP (ref 80–100)
MONOCYTES # BLD AUTO: 0.79 K/UL — SIGNIFICANT CHANGE UP (ref 0–0.9)
MONOCYTES NFR BLD AUTO: 8.4 % — SIGNIFICANT CHANGE UP (ref 2–14)
NEUTROPHILS # BLD AUTO: 5.18 K/UL — SIGNIFICANT CHANGE UP (ref 1.8–7.4)
NEUTROPHILS NFR BLD AUTO: 55.1 % — SIGNIFICANT CHANGE UP (ref 43–77)
NRBC # BLD: 0 /100 WBCS — SIGNIFICANT CHANGE UP (ref 0–0)
PHOSPHATE SERPL-MCNC: 3.9 MG/DL — SIGNIFICANT CHANGE UP (ref 2.5–4.5)
PLATELET # BLD AUTO: 664 K/UL — HIGH (ref 150–400)
POTASSIUM SERPL-MCNC: 3.2 MMOL/L — LOW (ref 3.5–5.3)
POTASSIUM SERPL-SCNC: 3.2 MMOL/L — LOW (ref 3.5–5.3)
PROT SERPL-MCNC: 7.4 G/DL — SIGNIFICANT CHANGE UP (ref 6–8.3)
RBC # BLD: 3.44 M/UL — LOW (ref 3.8–5.2)
RBC # FLD: 15.2 % — HIGH (ref 10.3–14.5)
SARS-COV-2 RNA SPEC QL NAA+PROBE: SIGNIFICANT CHANGE UP
SODIUM SERPL-SCNC: 137 MMOL/L — SIGNIFICANT CHANGE UP (ref 135–145)
WBC # BLD: 9.41 K/UL — SIGNIFICANT CHANGE UP (ref 3.8–10.5)
WBC # FLD AUTO: 9.41 K/UL — SIGNIFICANT CHANGE UP (ref 3.8–10.5)

## 2022-07-22 PROCEDURE — 99232 SBSQ HOSP IP/OBS MODERATE 35: CPT | Mod: GC

## 2022-07-22 RX ORDER — LISINOPRIL 2.5 MG/1
1 TABLET ORAL
Qty: 0 | Refills: 0 | DISCHARGE

## 2022-07-22 RX ORDER — NALOXEGOL OXALATE 12.5 MG/1
1 TABLET, FILM COATED ORAL
Qty: 0 | Refills: 0 | DISCHARGE
Start: 2022-07-22

## 2022-07-22 RX ORDER — SERTRALINE 25 MG/1
1 TABLET, FILM COATED ORAL
Qty: 0 | Refills: 0 | DISCHARGE
Start: 2022-07-22

## 2022-07-22 RX ORDER — PANTOPRAZOLE SODIUM 20 MG/1
1 TABLET, DELAYED RELEASE ORAL
Qty: 30 | Refills: 0
Start: 2022-07-22 | End: 2022-08-20

## 2022-07-22 RX ORDER — NIFEDIPINE 30 MG
1 TABLET, EXTENDED RELEASE 24 HR ORAL
Qty: 30 | Refills: 0
Start: 2022-07-22 | End: 2022-08-20

## 2022-07-22 RX ORDER — FUROSEMIDE 40 MG
1 TABLET ORAL
Qty: 0 | Refills: 0 | DISCHARGE

## 2022-07-22 RX ORDER — NIFEDIPINE 30 MG
1 TABLET, EXTENDED RELEASE 24 HR ORAL
Qty: 0 | Refills: 0 | DISCHARGE
Start: 2022-07-22

## 2022-07-22 RX ORDER — PANTOPRAZOLE SODIUM 20 MG/1
1 TABLET, DELAYED RELEASE ORAL
Qty: 0 | Refills: 0 | DISCHARGE

## 2022-07-22 RX ORDER — SERTRALINE 25 MG/1
1 TABLET, FILM COATED ORAL
Qty: 0 | Refills: 0 | DISCHARGE

## 2022-07-22 RX ORDER — PANTOPRAZOLE SODIUM 20 MG/1
1 TABLET, DELAYED RELEASE ORAL
Qty: 0 | Refills: 0 | DISCHARGE
Start: 2022-07-22

## 2022-07-22 RX ORDER — LISINOPRIL 2.5 MG/1
1 TABLET ORAL
Qty: 0 | Refills: 0 | DISCHARGE
Start: 2022-07-22

## 2022-07-22 RX ORDER — MUPIROCIN 20 MG/G
1 OINTMENT TOPICAL
Qty: 0 | Refills: 0 | DISCHARGE
Start: 2022-07-22

## 2022-07-22 RX ORDER — SERTRALINE 25 MG/1
1 TABLET, FILM COATED ORAL
Qty: 30 | Refills: 0
Start: 2022-07-22 | End: 2022-08-20

## 2022-07-22 RX ORDER — LISINOPRIL 2.5 MG/1
1 TABLET ORAL
Qty: 30 | Refills: 0
Start: 2022-07-22 | End: 2022-08-20

## 2022-07-22 RX ORDER — NIFEDIPINE 30 MG
1 TABLET, EXTENDED RELEASE 24 HR ORAL
Qty: 0 | Refills: 0 | DISCHARGE

## 2022-07-22 RX ORDER — OXYCODONE HYDROCHLORIDE 5 MG/1
1 TABLET ORAL
Qty: 0 | Refills: 0 | DISCHARGE
Start: 2022-07-22

## 2022-07-22 RX ORDER — FUROSEMIDE 40 MG
1 TABLET ORAL
Qty: 30 | Refills: 0
Start: 2022-07-22 | End: 2022-08-20

## 2022-07-22 RX ORDER — LOPERAMIDE HCL 2 MG
1 TABLET ORAL
Qty: 0 | Refills: 0 | DISCHARGE

## 2022-07-22 RX ORDER — FUROSEMIDE 40 MG
1 TABLET ORAL
Qty: 0 | Refills: 0 | DISCHARGE
Start: 2022-07-22

## 2022-07-22 RX ADMIN — Medication 100 MILLIGRAM(S): at 17:32

## 2022-07-22 RX ADMIN — Medication 100 MILLIGRAM(S): at 06:34

## 2022-07-22 RX ADMIN — HEPARIN SODIUM 7500 UNIT(S): 5000 INJECTION INTRAVENOUS; SUBCUTANEOUS at 06:29

## 2022-07-22 RX ADMIN — CHLORHEXIDINE GLUCONATE 1 APPLICATION(S): 213 SOLUTION TOPICAL at 09:19

## 2022-07-22 RX ADMIN — Medication 2: at 17:29

## 2022-07-22 RX ADMIN — HYDROMORPHONE HYDROCHLORIDE 1 MILLIGRAM(S): 2 INJECTION INTRAMUSCULAR; INTRAVENOUS; SUBCUTANEOUS at 09:53

## 2022-07-22 RX ADMIN — Medication 60 MILLIGRAM(S): at 06:29

## 2022-07-22 RX ADMIN — HUMAN INSULIN 30 UNIT(S): 100 INJECTION, SUSPENSION SUBCUTANEOUS at 22:26

## 2022-07-22 RX ADMIN — Medication 100 MILLIGRAM(S): at 22:23

## 2022-07-22 RX ADMIN — HUMAN INSULIN 30 UNIT(S): 100 INJECTION, SUSPENSION SUBCUTANEOUS at 17:30

## 2022-07-22 RX ADMIN — HUMAN INSULIN 30 UNIT(S): 100 INJECTION, SUSPENSION SUBCUTANEOUS at 12:18

## 2022-07-22 RX ADMIN — Medication 3 MILLILITER(S): at 13:00

## 2022-07-22 RX ADMIN — MUPIROCIN 1 APPLICATION(S): 20 OINTMENT TOPICAL at 13:06

## 2022-07-22 RX ADMIN — Medication 1 TABLET(S): at 13:00

## 2022-07-22 RX ADMIN — Medication 3 MILLILITER(S): at 17:53

## 2022-07-22 RX ADMIN — NALOXEGOL OXALATE 25 MILLIGRAM(S): 12.5 TABLET, FILM COATED ORAL at 13:00

## 2022-07-22 RX ADMIN — Medication 4: at 06:30

## 2022-07-22 RX ADMIN — HEPARIN SODIUM 7500 UNIT(S): 5000 INJECTION INTRAVENOUS; SUBCUTANEOUS at 22:22

## 2022-07-22 RX ADMIN — HEPARIN SODIUM 7500 UNIT(S): 5000 INJECTION INTRAVENOUS; SUBCUTANEOUS at 17:31

## 2022-07-22 RX ADMIN — OXYCODONE HYDROCHLORIDE 15 MILLIGRAM(S): 5 TABLET ORAL at 17:48

## 2022-07-22 RX ADMIN — Medication 1200 MILLIGRAM(S): at 17:54

## 2022-07-22 RX ADMIN — Medication 40 MILLIGRAM(S): at 06:30

## 2022-07-22 RX ADMIN — Medication 3 MILLILITER(S): at 06:29

## 2022-07-22 RX ADMIN — HUMAN INSULIN 30 UNIT(S): 100 INJECTION, SUSPENSION SUBCUTANEOUS at 00:34

## 2022-07-22 RX ADMIN — Medication 4: at 00:34

## 2022-07-22 RX ADMIN — Medication 500000 UNIT(S): at 13:00

## 2022-07-22 RX ADMIN — Medication 1200 MILLIGRAM(S): at 06:31

## 2022-07-22 RX ADMIN — Medication 4: at 22:27

## 2022-07-22 RX ADMIN — PANTOPRAZOLE SODIUM 40 MILLIGRAM(S): 20 TABLET, DELAYED RELEASE ORAL at 06:30

## 2022-07-22 RX ADMIN — SENNA PLUS 5 MILLILITER(S): 8.6 TABLET ORAL at 22:21

## 2022-07-22 RX ADMIN — Medication 4: at 12:18

## 2022-07-22 RX ADMIN — LISINOPRIL 10 MILLIGRAM(S): 2.5 TABLET ORAL at 06:29

## 2022-07-22 RX ADMIN — HUMAN INSULIN 30 UNIT(S): 100 INJECTION, SUSPENSION SUBCUTANEOUS at 06:30

## 2022-07-22 RX ADMIN — SERTRALINE 50 MILLIGRAM(S): 25 TABLET, FILM COATED ORAL at 17:54

## 2022-07-22 RX ADMIN — Medication 3 MILLILITER(S): at 22:21

## 2022-07-22 NOTE — PROGRESS NOTE ADULT - SUBJECTIVE AND OBJECTIVE BOX
Internal Medicine   Abeba Hafsa | PGY-1    OVERNIGHT EVENTS: No acute overnight events.    SUBJECTIVE:       MEDICATIONS  (STANDING):  albuterol/ipratropium for Nebulization 3 milliLiter(s) Nebulizer every 6 hours  chlorhexidine 4% Liquid 1 Application(s) Topical <User Schedule>  furosemide    Tablet 40 milliGRAM(s) Oral daily  glucagon  Injectable 1 milliGRAM(s) IntraMuscular once  guaiFENesin ER 1200 milliGRAM(s) Oral every 12 hours  heparin   Injectable 7500 Unit(s) SubCutaneous every 8 hours  insulin lispro (ADMELOG) corrective regimen sliding scale   SubCutaneous every 6 hours  insulin NPH human recombinant 30 Unit(s) SubCutaneous every 6 hours  lisinopril 10 milliGRAM(s) Oral daily  multivitamin 1 Tablet(s) Oral daily  mupirocin 2% Ointment 1 Application(s) Topical two times a day  naloxegol 25 milliGRAM(s) Oral daily  NIFEdipine XL 60 milliGRAM(s) Oral daily  nystatin    Suspension 802378 Unit(s) Swish and Swallow daily  pantoprazole    Tablet 40 milliGRAM(s) Oral before breakfast  polyethylene glycol 3350 17 Gram(s) Oral every 12 hours  pregabalin 100 milliGRAM(s) Oral every 8 hours  senna Syrup 5 milliLiter(s) Oral at bedtime  sertraline 50 milliGRAM(s) Oral daily    MEDICATIONS  (PRN):  hydrALAZINE Injectable 10 milliGRAM(s) IV Push three times a day PRN SBP > 170  HYDROmorphone  Injectable 1 milliGRAM(s) IV Push every 4 hours PRN Severe Pain (7 - 10)  oxyCODONE    IR 15 milliGRAM(s) Oral every 4 hours PRN Severe Pain (7 - 10)        T(F): 98.6 (07-22-22 @ 00:09), Max: 99.7 (07-21-22 @ 13:07)  HR: 95 (07-22-22 @ 06:39) (80 - 95)  BP: 139/66 (07-22-22 @ 06:30) (108/63 - 146/60)  BP(mean): --  RR: 18 (07-22-22 @ 00:09) (18 - 18)  SpO2: 94% (07-22-22 @ 06:39) (92% - 97%)    PHYSICAL EXAM:     GENERAL: NAD, lying in bed comfortably  HEAD:  Atraumatic, Normocephalic  EYES: EOMI, PERRLA, conjunctiva and sclera clear, no nystagmus noted  ENT: Moist mucous membranes,   NECK: Supple, No JVD, trachea midline  CHEST/LUNG: Clear to auscultation bilaterally; No rales, rhonchi, wheezing, or rubs. Unlabored respirations  HEART: Regular rate and rhythm; No murmurs, rubs, or gallops, normal S1/S2  ABDOMEN: normal bowel sounds; Soft, nontender, nondistended, no organomegaly   EXTREMITIES:  2+ Peripheral Pulses, brisk capillary refill. No clubbing, cyanosis, or edema  MSK: No gross deformities noted   Neurological:  A&Ox3, no focal deficits   SKIN: No rashes or lesions  PSYCH: Normal mood, affect     TELEMETRY:    LABS:                        10.6   10.69 )-----------( 729      ( 21 Jul 2022 11:13 )             34.7     07-21    139  |  100  |  11  ----------------------------<  214<H>  3.3<L>   |  26  |  0.95    Ca    9.0      21 Jul 2022 11:13  Phos  3.3     07-21  Mg     1.6     07-21    TPro  8.0  /  Alb  3.6  /  TBili  0.5  /  DBili  x   /  AST  40  /  ALT  40  /  AlkPhos  157<H>  07-21            Creatinine Trend: 0.95<--, 0.77<--, 0.65<--, 0.80<--, 0.82<--, 0.73<--  I&O's Summary    21 Jul 2022 07:01  -  22 Jul 2022 07:00  --------------------------------------------------------  IN: 0 mL / OUT: 1100 mL / NET: -1100 mL      BNP    RADIOLOGY & ADDITIONAL STUDIES:             Internal Medicine   Abeba Pereyra | PGY-1    OVERNIGHT EVENTS: No acute overnight events.    SUBJECTIVE: Patient was seen and examined at bedside this morning. Pt reports continued cough and incontinence. Pt requests cream for R facial abrasion and psych consult outpt following discharge. Denies any nausea/vomiting/diarrhea, headache, shortness of breath, abdominal pain or chest pain/palpitations. Patient responding appropriately to questions and able to make needs known. Vital signs/imaging/telemetry events reviewed.       MEDICATIONS  (STANDING):  albuterol/ipratropium for Nebulization 3 milliLiter(s) Nebulizer every 6 hours  chlorhexidine 4% Liquid 1 Application(s) Topical <User Schedule>  furosemide    Tablet 40 milliGRAM(s) Oral daily  glucagon  Injectable 1 milliGRAM(s) IntraMuscular once  guaiFENesin ER 1200 milliGRAM(s) Oral every 12 hours  heparin   Injectable 7500 Unit(s) SubCutaneous every 8 hours  insulin lispro (ADMELOG) corrective regimen sliding scale   SubCutaneous every 6 hours  insulin NPH human recombinant 30 Unit(s) SubCutaneous every 6 hours  lisinopril 10 milliGRAM(s) Oral daily  multivitamin 1 Tablet(s) Oral daily  mupirocin 2% Ointment 1 Application(s) Topical two times a day  naloxegol 25 milliGRAM(s) Oral daily  NIFEdipine XL 60 milliGRAM(s) Oral daily  nystatin    Suspension 676640 Unit(s) Swish and Swallow daily  pantoprazole    Tablet 40 milliGRAM(s) Oral before breakfast  polyethylene glycol 3350 17 Gram(s) Oral every 12 hours  pregabalin 100 milliGRAM(s) Oral every 8 hours  senna Syrup 5 milliLiter(s) Oral at bedtime  sertraline 50 milliGRAM(s) Oral daily    MEDICATIONS  (PRN):  hydrALAZINE Injectable 10 milliGRAM(s) IV Push three times a day PRN SBP > 170  HYDROmorphone  Injectable 1 milliGRAM(s) IV Push every 4 hours PRN Severe Pain (7 - 10)  oxyCODONE    IR 15 milliGRAM(s) Oral every 4 hours PRN Severe Pain (7 - 10)        T(F): 98.6 (07-22-22 @ 00:09), Max: 99.7 (07-21-22 @ 13:07)  HR: 95 (07-22-22 @ 06:39) (80 - 95)  BP: 139/66 (07-22-22 @ 06:30) (108/63 - 146/60)  BP(mean): --  RR: 18 (07-22-22 @ 00:09) (18 - 18)  SpO2: 94% (07-22-22 @ 06:39) (92% - 97%)    PHYSICAL EXAM:     GENERAL: NAD, lying in bed comfortably  HEAD:  Atraumatic, Normocephalic  EYES: EOMI, PERRLA, conjunctiva and sclera clear, no nystagmus noted  ENT: Moist mucous membranes,   NECK: Supple, No JVD, trachea midline  CHEST/LUNG: Clear to auscultation bilaterally; No rales, rhonchi, wheezing, or rubs. Unlabored respirations  HEART: Regular rate and rhythm; No murmurs, rubs, or gallops, normal S1/S2  ABDOMEN: normal bowel sounds; Soft, nontender, nondistended, no organomegaly   EXTREMITIES:  2+ Peripheral Pulses, brisk capillary refill. No clubbing, cyanosis, or edema  MSK: No gross deformities noted   Neurological:  A&Ox3, no focal deficits   SKIN: No rashes or lesions  PSYCH: Normal mood, affect     TELEMETRY:    LABS:                        10.6   10.69 )-----------( 729      ( 21 Jul 2022 11:13 )             34.7     07-21    139  |  100  |  11  ----------------------------<  214<H>  3.3<L>   |  26  |  0.95    Ca    9.0      21 Jul 2022 11:13  Phos  3.3     07-21  Mg     1.6     07-21    TPro  8.0  /  Alb  3.6  /  TBili  0.5  /  DBili  x   /  AST  40  /  ALT  40  /  AlkPhos  157<H>  07-21            Creatinine Trend: 0.95<--, 0.77<--, 0.65<--, 0.80<--, 0.82<--, 0.73<--  I&O's Summary    21 Jul 2022 07:01  -  22 Jul 2022 07:00  --------------------------------------------------------  IN: 0 mL / OUT: 1100 mL / NET: -1100 mL      BNP    RADIOLOGY & ADDITIONAL STUDIES:

## 2022-07-22 NOTE — PROGRESS NOTE ADULT - ASSESSMENT
Fuazia Linares is a 52 year old with  CRPS, MO, DM2, HTN, HTN, IBS, chronic back pain, pseudotumor cerebri, recent spinal nerve stimulator placed (7/8/22 @ [facility]) who initially presented with hypoxic respiratory failure, found to have multifocal pna 2/2 aspiration pneumonia now with increased work of breathing on bipap admitted to MICU. s/p intubation for hypoxic resp failure on bipap and with severe ARDS likely 2/2 Asp PNA. Now extubated and downgraded.

## 2022-07-22 NOTE — PROGRESS NOTE ADULT - PROBLEM SELECTOR PLAN 1
- Start Lyrica 100 mg every 8 hours (CrCl = 219.1)  - Mod pain: Start Oxycodone 10 mg every 4 hours PRN   - Severe pain: Oxycodone 15 mg Q4H PRN  - Warm/cool packs for comfort  - Bowel Regimen  - PT referral  - Monitor for sedation, respiratory depression  - Narcan Rescue Kit on discharge (Naloxone 4 mg/0.1 ml nasal spray - 1 spray q 2-3 minutes alternating between nostrils)  - Educated pt regarding pain medication and elevated BP and encourage pt to take pain medication regularly

## 2022-07-22 NOTE — PROGRESS NOTE ADULT - ATTENDING COMMENTS
51 y/o morbidly obese F w/ extensive medical history including pseudotumor cerebri and chronic back pain s/p recent placement of spinal nerve stimulator prior to admission admitted to MICU for acute hypoxemic respiratory failure secondary to likely bacterial PNA and hMPV infection.     1. Acute Hypoxemic and Hypercapnic Respiratory Failure - Resolved, extubated on 7/17  - 2/2 bacterial pneumonia and hMPV infection   - continue NIPPV QHS  - Aspiration precautions  - Wean O2 as tolerated  - c/w Mucinex   - IS while awake  - Start Tessalon pearles for cough  2. Oropharyngeal dysphagia s/p intubation.   - Swallow evaluation with FEES recommended regular diet with thin liquids  - Tolerating diet  3. Multifocal PNA - Concerning for aspiration PNA  - completed course of Zosyn  - Spinal stimulator leads removed during this stay  - hMPV- continue supportive care  - cheek lesion - likely related to proning  4 Essential hypertension   - c/w home antihypertensives. BP beter today with improved pain control  6. Chronic Pain   - Seen by Chronic pain, recommendations appreciated.   - c/w Lyrica, Oxycodone  7. Anisocoria- resolved  - CTH negative for acute pathology.   8.  DM II  Currently on NPH. Will likely need to be transitioned to basal bolus regimen now that on diet  9. Fever  - Was febrile overnight. Is nontoxic appearing. No cultures were drawn at time of fever. Will defer cultures for now as would likely be low yield at this times without elevated temperature. Should pt spike would perform fever workup   10. Foot lesions  - Podiatry eval noted    PT consult recommending JAI

## 2022-07-22 NOTE — PROGRESS NOTE ADULT - PROBLEM SELECTOR PLAN 7
- Podiatry consulted  - Aseptic excisional debridement of brett foot lesions  - Mupirocin R foot, DSD, zflows on brett feet

## 2022-07-23 ENCOUNTER — TRANSCRIPTION ENCOUNTER (OUTPATIENT)
Age: 52
End: 2022-07-23

## 2022-07-23 VITALS
HEART RATE: 86 BPM | TEMPERATURE: 98 F | SYSTOLIC BLOOD PRESSURE: 117 MMHG | RESPIRATION RATE: 20 BRPM | DIASTOLIC BLOOD PRESSURE: 54 MMHG | OXYGEN SATURATION: 95 %

## 2022-07-23 LAB
ALBUMIN SERPL ELPH-MCNC: 3.3 G/DL — SIGNIFICANT CHANGE UP (ref 3.3–5)
ALP SERPL-CCNC: 142 U/L — HIGH (ref 40–120)
ALT FLD-CCNC: 45 U/L — SIGNIFICANT CHANGE UP (ref 10–45)
ANION GAP SERPL CALC-SCNC: 14 MMOL/L — SIGNIFICANT CHANGE UP (ref 5–17)
AST SERPL-CCNC: 37 U/L — SIGNIFICANT CHANGE UP (ref 10–40)
BILIRUB SERPL-MCNC: 0.4 MG/DL — SIGNIFICANT CHANGE UP (ref 0.2–1.2)
BUN SERPL-MCNC: 13 MG/DL — SIGNIFICANT CHANGE UP (ref 7–23)
CALCIUM SERPL-MCNC: 9.2 MG/DL — SIGNIFICANT CHANGE UP (ref 8.4–10.5)
CHLORIDE SERPL-SCNC: 101 MMOL/L — SIGNIFICANT CHANGE UP (ref 96–108)
CO2 SERPL-SCNC: 24 MMOL/L — SIGNIFICANT CHANGE UP (ref 22–31)
CREAT SERPL-MCNC: 0.81 MG/DL — SIGNIFICANT CHANGE UP (ref 0.5–1.3)
EGFR: 87 ML/MIN/1.73M2 — SIGNIFICANT CHANGE UP
GLUCOSE BLDC GLUCOMTR-MCNC: 128 MG/DL — HIGH (ref 70–99)
GLUCOSE BLDC GLUCOMTR-MCNC: 139 MG/DL — HIGH (ref 70–99)
GLUCOSE BLDC GLUCOMTR-MCNC: 162 MG/DL — HIGH (ref 70–99)
GLUCOSE BLDC GLUCOMTR-MCNC: 197 MG/DL — HIGH (ref 70–99)
GLUCOSE SERPL-MCNC: 160 MG/DL — HIGH (ref 70–99)
HCT VFR BLD CALC: 34.3 % — LOW (ref 34.5–45)
HGB BLD-MCNC: 10.3 G/DL — LOW (ref 11.5–15.5)
MAGNESIUM SERPL-MCNC: 1.5 MG/DL — LOW (ref 1.6–2.6)
MCHC RBC-ENTMCNC: 28.4 PG — SIGNIFICANT CHANGE UP (ref 27–34)
MCHC RBC-ENTMCNC: 30 GM/DL — LOW (ref 32–36)
MCV RBC AUTO: 94.5 FL — SIGNIFICANT CHANGE UP (ref 80–100)
NRBC # BLD: 0 /100 WBCS — SIGNIFICANT CHANGE UP (ref 0–0)
PHOSPHATE SERPL-MCNC: 3.4 MG/DL — SIGNIFICANT CHANGE UP (ref 2.5–4.5)
PLATELET # BLD AUTO: 709 K/UL — HIGH (ref 150–400)
POTASSIUM SERPL-MCNC: 3.5 MMOL/L — SIGNIFICANT CHANGE UP (ref 3.5–5.3)
POTASSIUM SERPL-SCNC: 3.5 MMOL/L — SIGNIFICANT CHANGE UP (ref 3.5–5.3)
PROT SERPL-MCNC: 7.7 G/DL — SIGNIFICANT CHANGE UP (ref 6–8.3)
RBC # BLD: 3.63 M/UL — LOW (ref 3.8–5.2)
RBC # FLD: 15.4 % — HIGH (ref 10.3–14.5)
SARS-COV-2 RNA SPEC QL NAA+PROBE: SIGNIFICANT CHANGE UP
SODIUM SERPL-SCNC: 139 MMOL/L — SIGNIFICANT CHANGE UP (ref 135–145)
WBC # BLD: 8.58 K/UL — SIGNIFICANT CHANGE UP (ref 3.8–10.5)
WBC # FLD AUTO: 8.58 K/UL — SIGNIFICANT CHANGE UP (ref 3.8–10.5)

## 2022-07-23 PROCEDURE — 99239 HOSP IP/OBS DSCHRG MGMT >30: CPT | Mod: GC

## 2022-07-23 PROCEDURE — 83605 ASSAY OF LACTIC ACID: CPT

## 2022-07-23 PROCEDURE — 82330 ASSAY OF CALCIUM: CPT

## 2022-07-23 PROCEDURE — 70450 CT HEAD/BRAIN W/O DYE: CPT

## 2022-07-23 PROCEDURE — 81001 URINALYSIS AUTO W/SCOPE: CPT

## 2022-07-23 PROCEDURE — C8929: CPT

## 2022-07-23 PROCEDURE — 89051 BODY FLUID CELL COUNT: CPT

## 2022-07-23 PROCEDURE — 87116 MYCOBACTERIA CULTURE: CPT

## 2022-07-23 PROCEDURE — 84702 CHORIONIC GONADOTROPIN TEST: CPT

## 2022-07-23 PROCEDURE — 94640 AIRWAY INHALATION TREATMENT: CPT

## 2022-07-23 PROCEDURE — 87449 NOS EACH ORGANISM AG IA: CPT

## 2022-07-23 PROCEDURE — 74177 CT ABD & PELVIS W/CONTRAST: CPT | Mod: MD

## 2022-07-23 PROCEDURE — 85025 COMPLETE CBC W/AUTO DIFF WBC: CPT

## 2022-07-23 PROCEDURE — 94003 VENT MGMT INPAT SUBQ DAY: CPT

## 2022-07-23 PROCEDURE — 83735 ASSAY OF MAGNESIUM: CPT

## 2022-07-23 PROCEDURE — 84484 ASSAY OF TROPONIN QUANT: CPT

## 2022-07-23 PROCEDURE — 87070 CULTURE OTHR SPECIMN AEROBIC: CPT

## 2022-07-23 PROCEDURE — 83880 ASSAY OF NATRIURETIC PEPTIDE: CPT

## 2022-07-23 PROCEDURE — 92610 EVALUATE SWALLOWING FUNCTION: CPT

## 2022-07-23 PROCEDURE — 94002 VENT MGMT INPAT INIT DAY: CPT

## 2022-07-23 PROCEDURE — 80053 COMPREHEN METABOLIC PANEL: CPT

## 2022-07-23 PROCEDURE — 71275 CT ANGIOGRAPHY CHEST: CPT | Mod: MA

## 2022-07-23 PROCEDURE — 94660 CPAP INITIATION&MGMT: CPT

## 2022-07-23 PROCEDURE — 0225U NFCT DS DNA&RNA 21 SARSCOV2: CPT

## 2022-07-23 PROCEDURE — 70490 CT SOFT TISSUE NECK W/O DYE: CPT

## 2022-07-23 PROCEDURE — 82565 ASSAY OF CREATININE: CPT

## 2022-07-23 PROCEDURE — 87206 SMEAR FLUORESCENT/ACID STAI: CPT

## 2022-07-23 PROCEDURE — 84100 ASSAY OF PHOSPHORUS: CPT

## 2022-07-23 PROCEDURE — 80048 BASIC METABOLIC PNL TOTAL CA: CPT

## 2022-07-23 PROCEDURE — 85014 HEMATOCRIT: CPT

## 2022-07-23 PROCEDURE — 82435 ASSAY OF BLOOD CHLORIDE: CPT

## 2022-07-23 PROCEDURE — 83036 HEMOGLOBIN GLYCOSYLATED A1C: CPT

## 2022-07-23 PROCEDURE — 82803 BLOOD GASES ANY COMBINATION: CPT

## 2022-07-23 PROCEDURE — 99285 EMERGENCY DEPT VISIT HI MDM: CPT | Mod: 25

## 2022-07-23 PROCEDURE — 85730 THROMBOPLASTIN TIME PARTIAL: CPT

## 2022-07-23 PROCEDURE — 87637 SARSCOV2&INF A&B&RSV AMP PRB: CPT

## 2022-07-23 PROCEDURE — 85610 PROTHROMBIN TIME: CPT

## 2022-07-23 PROCEDURE — 84295 ASSAY OF SERUM SODIUM: CPT

## 2022-07-23 PROCEDURE — 87102 FUNGUS ISOLATION CULTURE: CPT

## 2022-07-23 PROCEDURE — 82947 ASSAY GLUCOSE BLOOD QUANT: CPT

## 2022-07-23 PROCEDURE — 87640 STAPH A DNA AMP PROBE: CPT

## 2022-07-23 PROCEDURE — 85027 COMPLETE CBC AUTOMATED: CPT

## 2022-07-23 PROCEDURE — 36415 COLL VENOUS BLD VENIPUNCTURE: CPT

## 2022-07-23 PROCEDURE — U0005: CPT

## 2022-07-23 PROCEDURE — 97161 PT EVAL LOW COMPLEX 20 MIN: CPT

## 2022-07-23 PROCEDURE — 85018 HEMOGLOBIN: CPT

## 2022-07-23 PROCEDURE — 84132 ASSAY OF SERUM POTASSIUM: CPT

## 2022-07-23 PROCEDURE — 87040 BLOOD CULTURE FOR BACTERIA: CPT

## 2022-07-23 PROCEDURE — 82962 GLUCOSE BLOOD TEST: CPT

## 2022-07-23 PROCEDURE — 87015 SPECIMEN INFECT AGNT CONCNTJ: CPT

## 2022-07-23 PROCEDURE — 87641 MR-STAPH DNA AMP PROBE: CPT

## 2022-07-23 PROCEDURE — U0003: CPT

## 2022-07-23 PROCEDURE — 71045 X-RAY EXAM CHEST 1 VIEW: CPT

## 2022-07-23 PROCEDURE — 76536 US EXAM OF HEAD AND NECK: CPT

## 2022-07-23 PROCEDURE — 96374 THER/PROPH/DIAG INJ IV PUSH: CPT

## 2022-07-23 PROCEDURE — 96375 TX/PRO/DX INJ NEW DRUG ADDON: CPT

## 2022-07-23 RX ADMIN — HUMAN INSULIN 30 UNIT(S): 100 INJECTION, SUSPENSION SUBCUTANEOUS at 06:19

## 2022-07-23 RX ADMIN — HYDROMORPHONE HYDROCHLORIDE 1 MILLIGRAM(S): 2 INJECTION INTRAMUSCULAR; INTRAVENOUS; SUBCUTANEOUS at 04:30

## 2022-07-23 RX ADMIN — Medication 1200 MILLIGRAM(S): at 06:17

## 2022-07-23 RX ADMIN — LISINOPRIL 10 MILLIGRAM(S): 2.5 TABLET ORAL at 06:17

## 2022-07-23 RX ADMIN — HEPARIN SODIUM 7500 UNIT(S): 5000 INJECTION INTRAVENOUS; SUBCUTANEOUS at 06:18

## 2022-07-23 RX ADMIN — SERTRALINE 50 MILLIGRAM(S): 25 TABLET, FILM COATED ORAL at 12:03

## 2022-07-23 RX ADMIN — Medication 500000 UNIT(S): at 12:02

## 2022-07-23 RX ADMIN — HYDROMORPHONE HYDROCHLORIDE 1 MILLIGRAM(S): 2 INJECTION INTRAMUSCULAR; INTRAVENOUS; SUBCUTANEOUS at 12:00

## 2022-07-23 RX ADMIN — Medication 100 MILLIGRAM(S): at 06:17

## 2022-07-23 RX ADMIN — HYDROMORPHONE HYDROCHLORIDE 1 MILLIGRAM(S): 2 INJECTION INTRAMUSCULAR; INTRAVENOUS; SUBCUTANEOUS at 00:25

## 2022-07-23 RX ADMIN — Medication 3 MILLILITER(S): at 06:18

## 2022-07-23 RX ADMIN — NALOXEGOL OXALATE 25 MILLIGRAM(S): 12.5 TABLET, FILM COATED ORAL at 12:03

## 2022-07-23 RX ADMIN — Medication 60 MILLIGRAM(S): at 06:18

## 2022-07-23 RX ADMIN — MUPIROCIN 1 APPLICATION(S): 20 OINTMENT TOPICAL at 07:02

## 2022-07-23 RX ADMIN — Medication 40 MILLIGRAM(S): at 06:17

## 2022-07-23 RX ADMIN — CHLORHEXIDINE GLUCONATE 1 APPLICATION(S): 213 SOLUTION TOPICAL at 08:58

## 2022-07-23 RX ADMIN — Medication 1 TABLET(S): at 12:02

## 2022-07-23 RX ADMIN — HYDROMORPHONE HYDROCHLORIDE 1 MILLIGRAM(S): 2 INJECTION INTRAMUSCULAR; INTRAVENOUS; SUBCUTANEOUS at 00:45

## 2022-07-23 RX ADMIN — HYDROMORPHONE HYDROCHLORIDE 1 MILLIGRAM(S): 2 INJECTION INTRAMUSCULAR; INTRAVENOUS; SUBCUTANEOUS at 04:07

## 2022-07-23 RX ADMIN — POLYETHYLENE GLYCOL 3350 17 GRAM(S): 17 POWDER, FOR SOLUTION ORAL at 06:19

## 2022-07-23 RX ADMIN — PANTOPRAZOLE SODIUM 40 MILLIGRAM(S): 20 TABLET, DELAYED RELEASE ORAL at 06:17

## 2022-07-23 NOTE — PROGRESS NOTE ADULT - PROBLEM SELECTOR PROBLEM 3
IBS (irritable bowel syndrome)
Pneumonia, aspiration

## 2022-07-23 NOTE — PROGRESS NOTE ADULT - PROBLEM SELECTOR PROBLEM 4
HTN (hypertension)
DM2 (diabetes mellitus, type 2)

## 2022-07-23 NOTE — PROGRESS NOTE ADULT - ATTENDING COMMENTS
Hospitalist- Angel Telles MD  Available on MS Teams  If no response or off-hours, page [phone]  -------------------------------------  Pt admitted with aspiration pna s/p abx ccb ards now much improved on nc and awaiting transfer to rehab    total discharge time 50 min

## 2022-07-23 NOTE — PROGRESS NOTE ADULT - SUBJECTIVE AND OBJECTIVE BOX
Internal Medicine   Abeba Hafsa | PGY-1    OVERNIGHT EVENTS: No acute overnight events.    SUBJECTIVE:       MEDICATIONS  (STANDING):  albuterol/ipratropium for Nebulization 3 milliLiter(s) Nebulizer every 6 hours  benzonatate 100 milliGRAM(s) Oral three times a day  chlorhexidine 4% Liquid 1 Application(s) Topical <User Schedule>  furosemide    Tablet 40 milliGRAM(s) Oral daily  glucagon  Injectable 1 milliGRAM(s) IntraMuscular once  guaiFENesin ER 1200 milliGRAM(s) Oral every 12 hours  heparin   Injectable 7500 Unit(s) SubCutaneous every 8 hours  insulin lispro (ADMELOG) corrective regimen sliding scale   SubCutaneous every 6 hours  insulin NPH human recombinant 30 Unit(s) SubCutaneous every 6 hours  lisinopril 10 milliGRAM(s) Oral daily  multivitamin 1 Tablet(s) Oral daily  mupirocin 2% Ointment 1 Application(s) Topical two times a day  naloxegol 25 milliGRAM(s) Oral daily  NIFEdipine XL 60 milliGRAM(s) Oral daily  nystatin    Suspension 003921 Unit(s) Swish and Swallow daily  pantoprazole    Tablet 40 milliGRAM(s) Oral before breakfast  polyethylene glycol 3350 17 Gram(s) Oral every 12 hours  pregabalin 100 milliGRAM(s) Oral every 8 hours  senna Syrup 5 milliLiter(s) Oral at bedtime  sertraline 50 milliGRAM(s) Oral daily    MEDICATIONS  (PRN):  hydrALAZINE Injectable 10 milliGRAM(s) IV Push three times a day PRN SBP > 170  HYDROmorphone  Injectable 1 milliGRAM(s) IV Push every 4 hours PRN Severe Pain (7 - 10)  oxyCODONE    IR 15 milliGRAM(s) Oral every 4 hours PRN Severe Pain (7 - 10)        T(F): 98.5 (07-22-22 @ 23:08), Max: 98.5 (07-22-22 @ 16:20)  HR: 84 (07-23-22 @ 06:11) (71 - 89)  BP: 141/67 (07-22-22 @ 23:08) (117/69 - 147/61)  BP(mean): --  RR: 18 (07-22-22 @ 23:08) (18 - 18)  SpO2: 96% (07-23-22 @ 06:11) (96% - 99%)    PHYSICAL EXAM:     GENERAL: NAD, lying in bed comfortably  HEAD:  Atraumatic, Normocephalic  EYES: EOMI, PERRLA, conjunctiva and sclera clear, no nystagmus noted  ENT: Moist mucous membranes,   NECK: Supple, No JVD, trachea midline  CHEST/LUNG: Clear to auscultation bilaterally; No rales, rhonchi, wheezing, or rubs. Unlabored respirations  HEART: Regular rate and rhythm; No murmurs, rubs, or gallops, normal S1/S2  ABDOMEN: normal bowel sounds; Soft, nontender, nondistended, no organomegaly   EXTREMITIES:  2+ Peripheral Pulses, brisk capillary refill. No clubbing, cyanosis, or edema  MSK: No gross deformities noted   Neurological:  A&Ox3, no focal deficits   SKIN: No rashes or lesions  PSYCH: Normal mood, affect     TELEMETRY:    LABS:                        9.7    9.41  )-----------( 664      ( 22 Jul 2022 08:58 )             32.1     07-22    137  |  100  |  14  ----------------------------<  181<H>  3.2<L>   |  26  |  0.85    Ca    8.9      22 Jul 2022 08:58  Phos  3.9     07-22  Mg     1.6     07-22    TPro  7.4  /  Alb  3.2<L>  /  TBili  0.4  /  DBili  x   /  AST  36  /  ALT  42  /  AlkPhos  139<H>  07-22            Creatinine Trend: 0.85<--, 0.95<--, 0.77<--, 0.65<--, 0.80<--, 0.82<--  I&O's Summary    22 Jul 2022 07:01  -  23 Jul 2022 07:00  --------------------------------------------------------  IN: 240 mL / OUT: 1000 mL / NET: -760 mL      BNP    RADIOLOGY & ADDITIONAL STUDIES:             Internal Medicine   Abeba Hafsa | PGY-1    OVERNIGHT EVENTS: No acute overnight events.    SUBJECTIVE: Patient was seen and examined at bedside this morning. Pt reports feeling tired from "cough attacks" during the night waking her from her sleep. Pt reports chest pain with taking deep breaths. Pt reports concern with being discharged today due to not having ambulated since July 8 and being discharged. Pt reports continued incontinence with coughing. Denies any nausea/vomiting/diarrhea, headache, shortness of breath, abdominal pain or palpitations. Patient responding appropriately to questions and able to make needs known. Vital signs/imaging/telemetry events reviewed.        MEDICATIONS  (STANDING):  albuterol/ipratropium for Nebulization 3 milliLiter(s) Nebulizer every 6 hours  benzonatate 100 milliGRAM(s) Oral three times a day  chlorhexidine 4% Liquid 1 Application(s) Topical <User Schedule>  furosemide    Tablet 40 milliGRAM(s) Oral daily  glucagon  Injectable 1 milliGRAM(s) IntraMuscular once  guaiFENesin ER 1200 milliGRAM(s) Oral every 12 hours  heparin   Injectable 7500 Unit(s) SubCutaneous every 8 hours  insulin lispro (ADMELOG) corrective regimen sliding scale   SubCutaneous every 6 hours  insulin NPH human recombinant 30 Unit(s) SubCutaneous every 6 hours  lisinopril 10 milliGRAM(s) Oral daily  multivitamin 1 Tablet(s) Oral daily  mupirocin 2% Ointment 1 Application(s) Topical two times a day  naloxegol 25 milliGRAM(s) Oral daily  NIFEdipine XL 60 milliGRAM(s) Oral daily  nystatin    Suspension 319758 Unit(s) Swish and Swallow daily  pantoprazole    Tablet 40 milliGRAM(s) Oral before breakfast  polyethylene glycol 3350 17 Gram(s) Oral every 12 hours  pregabalin 100 milliGRAM(s) Oral every 8 hours  senna Syrup 5 milliLiter(s) Oral at bedtime  sertraline 50 milliGRAM(s) Oral daily    MEDICATIONS  (PRN):  hydrALAZINE Injectable 10 milliGRAM(s) IV Push three times a day PRN SBP > 170  HYDROmorphone  Injectable 1 milliGRAM(s) IV Push every 4 hours PRN Severe Pain (7 - 10)  oxyCODONE    IR 15 milliGRAM(s) Oral every 4 hours PRN Severe Pain (7 - 10)        T(F): 98.5 (07-22-22 @ 23:08), Max: 98.5 (07-22-22 @ 16:20)  HR: 84 (07-23-22 @ 06:11) (71 - 89)  BP: 141/67 (07-22-22 @ 23:08) (117/69 - 147/61)  BP(mean): --  RR: 18 (07-22-22 @ 23:08) (18 - 18)  SpO2: 96% (07-23-22 @ 06:11) (96% - 99%)    PHYSICAL EXAM:     GENERAL: NAD, lying in bed comfortably  HEAD:  Atraumatic, Normocephalic  EYES: EOMI, PERRLA, conjunctiva and sclera clear, no nystagmus noted  ENT: Moist mucous membranes,   NECK: Supple, No JVD, trachea midline  CHEST/LUNG: +cough with deep breaths; Clear to auscultation bilaterally; No rales, rhonchi, wheezing, or rubs. Unlabored respirations  HEART: Regular rate and rhythm; No murmurs, rubs, or gallops, normal S1/S2  ABDOMEN: normal bowel sounds; Soft, nontender, nondistended, no organomegaly   EXTREMITIES:  2+ Peripheral Pulses, brisk capillary refill. No clubbing, cyanosis, or edema  MSK: No gross deformities noted   Neurological:  A&Ox3, no focal deficits   SKIN: No rashes or lesions  PSYCH: Normal mood, affect     TELEMETRY:    LABS:                        9.7    9.41  )-----------( 664      ( 22 Jul 2022 08:58 )             32.1     07-22    137  |  100  |  14  ----------------------------<  181<H>  3.2<L>   |  26  |  0.85    Ca    8.9      22 Jul 2022 08:58  Phos  3.9     07-22  Mg     1.6     07-22    TPro  7.4  /  Alb  3.2<L>  /  TBili  0.4  /  DBili  x   /  AST  36  /  ALT  42  /  AlkPhos  139<H>  07-22            Creatinine Trend: 0.85<--, 0.95<--, 0.77<--, 0.65<--, 0.80<--, 0.82<--  I&O's Summary    22 Jul 2022 07:01  -  23 Jul 2022 07:00  --------------------------------------------------------  IN: 240 mL / OUT: 1000 mL / NET: -760 mL      BNP    RADIOLOGY & ADDITIONAL STUDIES:

## 2022-07-23 NOTE — DISCHARGE NOTE NURSING/CASE MANAGEMENT/SOCIAL WORK - NSDCFUADDAPPT_GEN_ALL_CORE_FT
Podiatry Discharge Instructions:  Follow up: Please follow up with Dr. Wolfe within 1 week of discharge from the hospital, please call [phone] for appointment and discuss that you recently were seen in the hospital.  Wound Care: Please apply mupirocin to the right foot wound followed by 4x4 gauze and alison daily.   Weight bearing: Please weight bear as tolerated in a surgical shoe.  Antibiotics: Please continue as instructed.

## 2022-07-23 NOTE — PROGRESS NOTE ADULT - ASSESSMENT
Fauzia Linares is a 52 year old with  CRPS, MO, DM2, HTN, HTN, IBS, chronic back pain, pseudotumor cerebri, recent spinal nerve stimulator placed (7/8/22 @ [facility]) who initially presented with hypoxic respiratory failure, found to have multifocal pna 2/2 aspiration pneumonia now with increased work of breathing on bipap admitted to MICU. s/p intubation for hypoxic resp failure on bipap and with severe ARDS likely 2/2 Asp PNA. Now extubated and downgraded.   Fauzia Linares is a 52 year old with  CRPS, MO, DM2, HTN, HTN, IBS, chronic back pain, pseudotumor cerebri, recent spinal nerve stimulator placed (7/8/22 @ [facility]) who initially presented with hypoxic respiratory failure, found to have multifocal pna 2/2 aspiration pneumonia now with increased work of breathing on bipap admitted to MICU. s/p intubation for hypoxic resp failure on bipap and with severe ARDS likely 2/2 Asp PNA. Now extubated and downgraded.

## 2022-07-23 NOTE — PROGRESS NOTE ADULT - PROBLEM SELECTOR PROBLEM 2
DM2 (diabetes mellitus, type 2)
Sepsis
DM2 (diabetes mellitus, type 2)

## 2022-07-23 NOTE — PROGRESS NOTE ADULT - PROBLEM SELECTOR PROBLEM 1
S/P insertion of spinal cord stimulator
Acute respiratory failure with hypoxia

## 2022-07-23 NOTE — DISCHARGE NOTE NURSING/CASE MANAGEMENT/SOCIAL WORK - PATIENT PORTAL LINK FT
You can access the FollowMyHealth Patient Portal offered by [facility] by registering at the following website: [email or web address]. By joining [employer]’[facility] FollowMyHealth portal, you will also be able to view your health information using other applications (apps) compatible with our system.

## 2022-07-23 NOTE — PROGRESS NOTE ADULT - REASON FOR ADMISSION
sob, fever, cough

## 2022-07-23 NOTE — PROGRESS NOTE ADULT - PROBLEM SELECTOR PLAN 6
- c/w heparin 7500 q8 subQ for DVT ppx
- cont nifedipine. holding lisinopril, lasix

## 2022-07-23 NOTE — PROGRESS NOTE ADULT - PROVIDER SPECIALTY LIST ADULT
MICU
Internal Medicine
Internal Medicine
MICU
MICU
Neurosurgery
Internal Medicine
MICU
MICU
Internal Medicine

## 2022-07-23 NOTE — DISCHARGE NOTE NURSING/CASE MANAGEMENT/SOCIAL WORK - NSDCPEFALRISK_GEN_ALL_CORE
For information on Fall & Injury Prevention, visit: [email or web address] OR  [email or web address] OR  https://www.cdc.gov/steadi/patient.html

## 2022-07-23 NOTE — PROGRESS NOTE ADULT - PROBLEM SELECTOR PLAN 7
- Podiatry consulted  - Aseptic excisional debridement of brett foot lesions  - Mupirocin R foot, DSD, zflows on brett feet - Podiatry consulted  - s/p aseptic excisional debridement of brett foot lesions  - Mupirocin R foot, DSD, zflows on brett feet

## 2022-07-23 NOTE — PROGRESS NOTE ADULT - PROBLEM SELECTOR PROBLEM 5
Pneumonia, aspiration
IBS (irritable bowel syndrome)

## 2022-07-25 ENCOUNTER — APPOINTMENT (OUTPATIENT)
Dept: HOME HEALTH SERVICES | Facility: HOME HEALTH | Age: 52
End: 2022-07-25

## 2022-08-08 ENCOUNTER — NON-APPOINTMENT (OUTPATIENT)
Age: 52
End: 2022-08-08

## 2022-08-10 LAB
CULTURE RESULTS: SIGNIFICANT CHANGE UP
SPECIMEN SOURCE: SIGNIFICANT CHANGE UP

## 2022-08-19 ENCOUNTER — RX RENEWAL (OUTPATIENT)
Age: 52
End: 2022-08-19

## 2022-08-29 ENCOUNTER — EMERGENCY (EMERGENCY)
Facility: HOSPITAL | Age: 52
LOS: 1 days | Discharge: HOME | End: 2022-08-29
Attending: EMERGENCY MEDICINE
Payer: MEDICARE

## 2022-08-29 VITALS
OXYGEN SATURATION: 99 % | WEIGHT: 293 LBS | HEART RATE: 86 BPM | HEIGHT: 68 IN | SYSTOLIC BLOOD PRESSURE: 152 MMHG | RESPIRATION RATE: 16 BRPM | DIASTOLIC BLOOD PRESSURE: 84 MMHG | TEMPERATURE: 98 F

## 2022-08-29 DIAGNOSIS — Z98.890 OTHER SPECIFIED POSTPROCEDURAL STATES: Chronic | ICD-10-CM

## 2022-08-29 DIAGNOSIS — Z90.49 ACQUIRED ABSENCE OF OTHER SPECIFIED PARTS OF DIGESTIVE TRACT: Chronic | ICD-10-CM

## 2022-08-29 LAB
ALBUMIN SERPL ELPH-MCNC: 4 G/DL — SIGNIFICANT CHANGE UP (ref 3.3–5)
ALP SERPL-CCNC: 94 U/L — SIGNIFICANT CHANGE UP (ref 40–120)
ALT FLD-CCNC: 15 U/L — SIGNIFICANT CHANGE UP (ref 10–45)
ANION GAP SERPL CALC-SCNC: 13 MMOL/L — SIGNIFICANT CHANGE UP (ref 5–17)
AST SERPL-CCNC: 18 U/L — SIGNIFICANT CHANGE UP (ref 10–40)
BASOPHILS # BLD AUTO: 0.04 K/UL — SIGNIFICANT CHANGE UP (ref 0–0.2)
BASOPHILS NFR BLD AUTO: 0.4 % — SIGNIFICANT CHANGE UP (ref 0–2)
BILIRUB SERPL-MCNC: 0.2 MG/DL — SIGNIFICANT CHANGE UP (ref 0.2–1.2)
BUN SERPL-MCNC: 11 MG/DL — SIGNIFICANT CHANGE UP (ref 7–23)
CALCIUM SERPL-MCNC: 9 MG/DL — SIGNIFICANT CHANGE UP (ref 8.4–10.5)
CHLORIDE SERPL-SCNC: 102 MMOL/L — SIGNIFICANT CHANGE UP (ref 96–108)
CO2 SERPL-SCNC: 26 MMOL/L — SIGNIFICANT CHANGE UP (ref 22–31)
CREAT SERPL-MCNC: 0.7 MG/DL — SIGNIFICANT CHANGE UP (ref 0.5–1.3)
EGFR: 104 ML/MIN/1.73M2 — SIGNIFICANT CHANGE UP
EOSINOPHIL # BLD AUTO: 0.12 K/UL — SIGNIFICANT CHANGE UP (ref 0–0.5)
EOSINOPHIL NFR BLD AUTO: 1.1 % — SIGNIFICANT CHANGE UP (ref 0–6)
GLUCOSE SERPL-MCNC: 210 MG/DL — HIGH (ref 70–99)
HCT VFR BLD CALC: 33.9 % — LOW (ref 34.5–45)
HGB BLD-MCNC: 10.8 G/DL — LOW (ref 11.5–15.5)
IMM GRANULOCYTES NFR BLD AUTO: 0.4 % — SIGNIFICANT CHANGE UP (ref 0–1.5)
LYMPHOCYTES # BLD AUTO: 3.58 K/UL — HIGH (ref 1–3.3)
LYMPHOCYTES # BLD AUTO: 33.7 % — SIGNIFICANT CHANGE UP (ref 13–44)
MCHC RBC-ENTMCNC: 28.9 PG — SIGNIFICANT CHANGE UP (ref 27–34)
MCHC RBC-ENTMCNC: 31.9 GM/DL — LOW (ref 32–36)
MCV RBC AUTO: 90.6 FL — SIGNIFICANT CHANGE UP (ref 80–100)
MONOCYTES # BLD AUTO: 0.51 K/UL — SIGNIFICANT CHANGE UP (ref 0–0.9)
MONOCYTES NFR BLD AUTO: 4.8 % — SIGNIFICANT CHANGE UP (ref 2–14)
NEUTROPHILS # BLD AUTO: 6.33 K/UL — SIGNIFICANT CHANGE UP (ref 1.8–7.4)
NEUTROPHILS NFR BLD AUTO: 59.6 % — SIGNIFICANT CHANGE UP (ref 43–77)
NRBC # BLD: 0 /100 WBCS — SIGNIFICANT CHANGE UP (ref 0–0)
PLATELET # BLD AUTO: 452 K/UL — HIGH (ref 150–400)
POTASSIUM SERPL-MCNC: 4 MMOL/L — SIGNIFICANT CHANGE UP (ref 3.5–5.3)
POTASSIUM SERPL-SCNC: 4 MMOL/L — SIGNIFICANT CHANGE UP (ref 3.5–5.3)
PROT SERPL-MCNC: 7.8 G/DL — SIGNIFICANT CHANGE UP (ref 6–8.3)
RBC # BLD: 3.74 M/UL — LOW (ref 3.8–5.2)
RBC # FLD: 14.1 % — SIGNIFICANT CHANGE UP (ref 10.3–14.5)
SODIUM SERPL-SCNC: 141 MMOL/L — SIGNIFICANT CHANGE UP (ref 135–145)
WBC # BLD: 10.62 K/UL — HIGH (ref 3.8–10.5)
WBC # FLD AUTO: 10.62 K/UL — HIGH (ref 3.8–10.5)

## 2022-08-29 PROCEDURE — 80053 COMPREHEN METABOLIC PANEL: CPT

## 2022-08-29 PROCEDURE — 99284 EMERGENCY DEPT VISIT MOD MDM: CPT | Mod: 25

## 2022-08-29 PROCEDURE — 99285 EMERGENCY DEPT VISIT HI MDM: CPT

## 2022-08-29 PROCEDURE — 85025 COMPLETE CBC W/AUTO DIFF WBC: CPT

## 2022-08-29 PROCEDURE — 93971 EXTREMITY STUDY: CPT

## 2022-08-29 PROCEDURE — 93971 EXTREMITY STUDY: CPT | Mod: 26,RT

## 2022-08-29 RX ORDER — OXYCODONE HYDROCHLORIDE 5 MG/1
15 TABLET ORAL ONCE
Refills: 0 | Status: DISCONTINUED | OUTPATIENT
Start: 2022-08-29 | End: 2022-08-29

## 2022-08-29 RX ORDER — OXYCODONE HYDROCHLORIDE 5 MG/1
10 TABLET ORAL ONCE
Refills: 0 | Status: DISCONTINUED | OUTPATIENT
Start: 2022-08-29 | End: 2022-08-29

## 2022-08-29 RX ADMIN — OXYCODONE HYDROCHLORIDE 15 MILLIGRAM(S): 5 TABLET ORAL at 19:35

## 2022-08-29 RX ADMIN — OXYCODONE HYDROCHLORIDE 10 MILLIGRAM(S): 5 TABLET ORAL at 23:32

## 2022-08-29 RX ADMIN — OXYCODONE HYDROCHLORIDE 15 MILLIGRAM(S): 5 TABLET ORAL at 18:36

## 2022-08-29 RX ADMIN — OXYCODONE HYDROCHLORIDE 10 MILLIGRAM(S): 5 TABLET ORAL at 23:01

## 2022-08-29 NOTE — ED ADULT NURSE NOTE - OBJECTIVE STATEMENT
Follow up prn ibuprofen effective 2-10 joint pain. 52 yr old female to ED via EMS with h/o Resp failure on 02 3l n/c , type 2 diabetes, complex regional pain syndrome . HTN, neurostimulator. Pt awake alert and orientedx4 Resp even and nonlab Denies chest pain or sob In Rehab to learn assist with ambulation 52 yr old female to ED via EMS with h/o Resp failure on 02 2l n/c , type 2 diabetes, complex regional pain syndrome . HTN, neurostimulator. Pt awake alert and orientedx4 Resp even and nonlab Denies chest pain or sob In Rehab to learn assist with ambulation  Rle swollen taut painful to touch

## 2022-08-29 NOTE — ED PROVIDER NOTE - PHYSICAL EXAMINATION
Admission Attn - alert, NAD, obese, no pallor or jaundice, PERRL 3 mm, moist mm, skin - warm and dry, Lungs - clear, no w/r/r, good BS bilaterally, Cor - rr, no M, no rub, Abdo - ND, soft, NT, no HSM, no CVAT, no guarding or rebound. Back - no SI tenderness, no tenderness sciatic notch, Extremities - tender R lateral leg/thigh/hip, slight increased heat in R leg, no obvious swelling of R compaired to left leg.  dry ulceration plantar R foot, laterally,  no edema, no calf tenderness, distal pulses intact and symmetrical, Neuro - intact and non-focal

## 2022-08-29 NOTE — ED PROVIDER NOTE - NSFOLLOWUPINSTRUCTIONS_ED_ALL_ED_FT
1- Contin all pain medications as prescribed  2- Continue your rehab at the facility  3- Continue all meditations as prescribed including your pain medications  4- Follow up at our spine center  5- Return to ER for any new or worsening symptoms

## 2022-08-29 NOTE — ED PROVIDER NOTE - CLINICAL SUMMARY MEDICAL DECISION MAKING FREE TEXT BOX
Attending note.  Patient with severe pain in the right leg with report of swelling and increased heat.  Rule out DVT versus infection.  Ultrasound, labs, reassess, analgesia.

## 2022-08-30 VITALS
OXYGEN SATURATION: 99 % | HEART RATE: 75 BPM | SYSTOLIC BLOOD PRESSURE: 115 MMHG | TEMPERATURE: 98 F | RESPIRATION RATE: 16 BRPM | DIASTOLIC BLOOD PRESSURE: 60 MMHG

## 2022-08-31 ENCOUNTER — NON-APPOINTMENT (OUTPATIENT)
Age: 52
End: 2022-08-31

## 2022-08-31 LAB
CULTURE RESULTS: SIGNIFICANT CHANGE UP
SPECIMEN SOURCE: SIGNIFICANT CHANGE UP

## 2022-09-01 ENCOUNTER — RX RENEWAL (OUTPATIENT)
Age: 52
End: 2022-09-01

## 2022-09-08 ENCOUNTER — NON-APPOINTMENT (OUTPATIENT)
Age: 52
End: 2022-09-08

## 2022-09-08 ENCOUNTER — APPOINTMENT (OUTPATIENT)
Dept: BARIATRICS/WEIGHT MGMT | Facility: CLINIC | Age: 52
End: 2022-09-08

## 2022-09-09 ENCOUNTER — NON-APPOINTMENT (OUTPATIENT)
Age: 52
End: 2022-09-09

## 2022-09-14 ENCOUNTER — TRANSCRIPTION ENCOUNTER (OUTPATIENT)
Age: 52
End: 2022-09-14

## 2022-09-21 ENCOUNTER — NON-APPOINTMENT (OUTPATIENT)
Age: 52
End: 2022-09-21

## 2022-09-21 ENCOUNTER — INPATIENT (INPATIENT)
Facility: HOSPITAL | Age: 52
LOS: 12 days | Discharge: NURSING, REHAB OR LONG-TERM CARE | End: 2022-10-04
Attending: INTERNAL MEDICINE | Admitting: INTERNAL MEDICINE

## 2022-09-21 VITALS
HEIGHT: 68 IN | TEMPERATURE: 98 F | RESPIRATION RATE: 17 BRPM | WEIGHT: 293 LBS | OXYGEN SATURATION: 95 % | DIASTOLIC BLOOD PRESSURE: 65 MMHG | SYSTOLIC BLOOD PRESSURE: 126 MMHG | HEART RATE: 75 BPM

## 2022-09-21 DIAGNOSIS — Z90.49 ACQUIRED ABSENCE OF OTHER SPECIFIED PARTS OF DIGESTIVE TRACT: Chronic | ICD-10-CM

## 2022-09-21 DIAGNOSIS — Z98.890 OTHER SPECIFIED POSTPROCEDURAL STATES: Chronic | ICD-10-CM

## 2022-09-21 PROCEDURE — 93010 ELECTROCARDIOGRAM REPORT: CPT

## 2022-09-21 PROCEDURE — 99285 EMERGENCY DEPT VISIT HI MDM: CPT

## 2022-09-21 RX ORDER — MORPHINE SULFATE 50 MG/1
8 CAPSULE, EXTENDED RELEASE ORAL ONCE
Refills: 0 | Status: DISCONTINUED | OUTPATIENT
Start: 2022-09-21 | End: 2022-09-21

## 2022-09-21 RX ORDER — FUROSEMIDE 40 MG
80 TABLET ORAL ONCE
Refills: 0 | Status: COMPLETED | OUTPATIENT
Start: 2022-09-21 | End: 2022-09-21

## 2022-09-21 NOTE — ED ADULT TRIAGE NOTE - CHIEF COMPLAINT QUOTE
From nursing home c/o sob and fluid retention x 6 days. has being using oxine continuously 4 liters n/c.pt has chronic pain.

## 2022-09-22 ENCOUNTER — NON-APPOINTMENT (OUTPATIENT)
Age: 52
End: 2022-09-22

## 2022-09-22 DIAGNOSIS — E87.6 HYPOKALEMIA: ICD-10-CM

## 2022-09-22 DIAGNOSIS — F41.9 ANXIETY DISORDER, UNSPECIFIED: ICD-10-CM

## 2022-09-22 DIAGNOSIS — E11.9 TYPE 2 DIABETES MELLITUS WITHOUT COMPLICATIONS: ICD-10-CM

## 2022-09-22 DIAGNOSIS — J96.01 ACUTE RESPIRATORY FAILURE WITH HYPOXIA: ICD-10-CM

## 2022-09-22 DIAGNOSIS — I10 ESSENTIAL (PRIMARY) HYPERTENSION: ICD-10-CM

## 2022-09-22 DIAGNOSIS — Z29.9 ENCOUNTER FOR PROPHYLACTIC MEASURES, UNSPECIFIED: ICD-10-CM

## 2022-09-22 DIAGNOSIS — G90.50 COMPLEX REGIONAL PAIN SYNDROME I, UNSPECIFIED: ICD-10-CM

## 2022-09-22 DIAGNOSIS — M79.89 OTHER SPECIFIED SOFT TISSUE DISORDERS: ICD-10-CM

## 2022-09-22 LAB
ALBUMIN SERPL ELPH-MCNC: 2.9 G/DL — LOW (ref 3.3–5)
ALP SERPL-CCNC: 90 U/L — SIGNIFICANT CHANGE UP (ref 40–120)
ALT FLD-CCNC: 24 U/L — SIGNIFICANT CHANGE UP (ref 12–78)
ANION GAP SERPL CALC-SCNC: 8 MMOL/L — SIGNIFICANT CHANGE UP (ref 5–17)
AST SERPL-CCNC: 16 U/L — SIGNIFICANT CHANGE UP (ref 15–37)
BASOPHILS # BLD AUTO: 0.04 K/UL — SIGNIFICANT CHANGE UP (ref 0–0.2)
BASOPHILS NFR BLD AUTO: 0.3 % — SIGNIFICANT CHANGE UP (ref 0–2)
BILIRUB SERPL-MCNC: 0.3 MG/DL — SIGNIFICANT CHANGE UP (ref 0.2–1.2)
BUN SERPL-MCNC: 13 MG/DL — SIGNIFICANT CHANGE UP (ref 7–23)
CALCIUM SERPL-MCNC: 7.6 MG/DL — LOW (ref 8.5–10.1)
CHLORIDE SERPL-SCNC: 110 MMOL/L — HIGH (ref 96–108)
CO2 SERPL-SCNC: 27 MMOL/L — SIGNIFICANT CHANGE UP (ref 22–31)
CREAT SERPL-MCNC: 0.85 MG/DL — SIGNIFICANT CHANGE UP (ref 0.5–1.3)
D DIMER BLD IA.RAPID-MCNC: 461 NG/ML DDU — HIGH
EGFR: 82 ML/MIN/1.73M2 — SIGNIFICANT CHANGE UP
EOSINOPHIL # BLD AUTO: 0.16 K/UL — SIGNIFICANT CHANGE UP (ref 0–0.5)
EOSINOPHIL NFR BLD AUTO: 1.1 % — SIGNIFICANT CHANGE UP (ref 0–6)
FLUAV AG NPH QL: SIGNIFICANT CHANGE UP
FLUBV AG NPH QL: SIGNIFICANT CHANGE UP
GLUCOSE BLDC GLUCOMTR-MCNC: 116 MG/DL — HIGH (ref 70–99)
GLUCOSE BLDC GLUCOMTR-MCNC: 128 MG/DL — HIGH (ref 70–99)
GLUCOSE BLDC GLUCOMTR-MCNC: 132 MG/DL — HIGH (ref 70–99)
GLUCOSE BLDC GLUCOMTR-MCNC: 141 MG/DL — HIGH (ref 70–99)
GLUCOSE BLDC GLUCOMTR-MCNC: 77 MG/DL — SIGNIFICANT CHANGE UP (ref 70–99)
GLUCOSE BLDC GLUCOMTR-MCNC: 85 MG/DL — SIGNIFICANT CHANGE UP (ref 70–99)
GLUCOSE SERPL-MCNC: 64 MG/DL — LOW (ref 70–99)
HCG SERPL-ACNC: <1 MIU/ML — SIGNIFICANT CHANGE UP
HCT VFR BLD CALC: 31.9 % — LOW (ref 34.5–45)
HGB BLD-MCNC: 10 G/DL — LOW (ref 11.5–15.5)
IMM GRANULOCYTES NFR BLD AUTO: 0.4 % — SIGNIFICANT CHANGE UP (ref 0–0.9)
LYMPHOCYTES # BLD AUTO: 31.5 % — SIGNIFICANT CHANGE UP (ref 13–44)
LYMPHOCYTES # BLD AUTO: 4.45 K/UL — HIGH (ref 1–3.3)
MCHC RBC-ENTMCNC: 27.7 PG — SIGNIFICANT CHANGE UP (ref 27–34)
MCHC RBC-ENTMCNC: 31.3 G/DL — LOW (ref 32–36)
MCV RBC AUTO: 88.4 FL — SIGNIFICANT CHANGE UP (ref 80–100)
MONOCYTES # BLD AUTO: 0.74 K/UL — SIGNIFICANT CHANGE UP (ref 0–0.9)
MONOCYTES NFR BLD AUTO: 5.2 % — SIGNIFICANT CHANGE UP (ref 2–14)
NEUTROPHILS # BLD AUTO: 8.66 K/UL — HIGH (ref 1.8–7.4)
NEUTROPHILS NFR BLD AUTO: 61.5 % — SIGNIFICANT CHANGE UP (ref 43–77)
NRBC # BLD: 0 /100 WBCS — SIGNIFICANT CHANGE UP (ref 0–0)
NT-PROBNP SERPL-SCNC: 87 PG/ML — SIGNIFICANT CHANGE UP (ref 0–125)
PLATELET # BLD AUTO: 466 K/UL — HIGH (ref 150–400)
POTASSIUM SERPL-MCNC: 3.1 MMOL/L — LOW (ref 3.5–5.3)
POTASSIUM SERPL-SCNC: 3.1 MMOL/L — LOW (ref 3.5–5.3)
PROT SERPL-MCNC: 7.2 GM/DL — SIGNIFICANT CHANGE UP (ref 6–8.3)
RBC # BLD: 3.61 M/UL — LOW (ref 3.8–5.2)
RBC # FLD: 15.1 % — HIGH (ref 10.3–14.5)
SARS-COV-2 RNA SPEC QL NAA+PROBE: SIGNIFICANT CHANGE UP
SODIUM SERPL-SCNC: 145 MMOL/L — SIGNIFICANT CHANGE UP (ref 135–145)
TROPONIN I, HIGH SENSITIVITY RESULT: 15.9 NG/L — SIGNIFICANT CHANGE UP
WBC # BLD: 14.11 K/UL — HIGH (ref 3.8–10.5)
WBC # FLD AUTO: 14.11 K/UL — HIGH (ref 3.8–10.5)

## 2022-09-22 PROCEDURE — 99222 1ST HOSP IP/OBS MODERATE 55: CPT

## 2022-09-22 PROCEDURE — 71046 X-RAY EXAM CHEST 2 VIEWS: CPT | Mod: 26

## 2022-09-22 RX ORDER — FUROSEMIDE 40 MG
40 TABLET ORAL
Refills: 0 | Status: DISCONTINUED | OUTPATIENT
Start: 2022-09-22 | End: 2022-09-25

## 2022-09-22 RX ORDER — DEXTROSE 50 % IN WATER 50 %
25 SYRINGE (ML) INTRAVENOUS ONCE
Refills: 0 | Status: DISCONTINUED | OUTPATIENT
Start: 2022-09-22 | End: 2022-10-04

## 2022-09-22 RX ORDER — HYDROMORPHONE HYDROCHLORIDE 2 MG/ML
1 INJECTION INTRAMUSCULAR; INTRAVENOUS; SUBCUTANEOUS ONCE
Refills: 0 | Status: DISCONTINUED | OUTPATIENT
Start: 2022-09-22 | End: 2022-09-22

## 2022-09-22 RX ORDER — INFLUENZA VIRUS VACCINE 15; 15; 15; 15 UG/.5ML; UG/.5ML; UG/.5ML; UG/.5ML
0.5 SUSPENSION INTRAMUSCULAR ONCE
Refills: 0 | Status: COMPLETED | OUTPATIENT
Start: 2022-09-22 | End: 2022-10-04

## 2022-09-22 RX ORDER — LANOLIN ALCOHOL/MO/W.PET/CERES
3 CREAM (GRAM) TOPICAL AT BEDTIME
Refills: 0 | Status: DISCONTINUED | OUTPATIENT
Start: 2022-09-22 | End: 2022-10-04

## 2022-09-22 RX ORDER — DEXTROSE 50 % IN WATER 50 %
12.5 SYRINGE (ML) INTRAVENOUS ONCE
Refills: 0 | Status: DISCONTINUED | OUTPATIENT
Start: 2022-09-22 | End: 2022-10-04

## 2022-09-22 RX ORDER — SODIUM CHLORIDE 9 MG/ML
1000 INJECTION, SOLUTION INTRAVENOUS
Refills: 0 | Status: DISCONTINUED | OUTPATIENT
Start: 2022-09-22 | End: 2022-10-04

## 2022-09-22 RX ORDER — INSULIN LISPRO 100/ML
VIAL (ML) SUBCUTANEOUS
Refills: 0 | Status: DISCONTINUED | OUTPATIENT
Start: 2022-09-22 | End: 2022-10-04

## 2022-09-22 RX ORDER — NIFEDIPINE 30 MG
60 TABLET, EXTENDED RELEASE 24 HR ORAL DAILY
Refills: 0 | Status: DISCONTINUED | OUTPATIENT
Start: 2022-09-22 | End: 2022-10-03

## 2022-09-22 RX ORDER — GLUCAGON INJECTION, SOLUTION 0.5 MG/.1ML
1 INJECTION, SOLUTION SUBCUTANEOUS ONCE
Refills: 0 | Status: DISCONTINUED | OUTPATIENT
Start: 2022-09-22 | End: 2022-10-04

## 2022-09-22 RX ORDER — OXYCODONE HYDROCHLORIDE 5 MG/1
15 TABLET ORAL EVERY 6 HOURS
Refills: 0 | Status: DISCONTINUED | OUTPATIENT
Start: 2022-09-22 | End: 2022-09-23

## 2022-09-22 RX ORDER — SIMETHICONE 80 MG/1
80 TABLET, CHEWABLE ORAL THREE TIMES A DAY
Refills: 0 | Status: DISCONTINUED | OUTPATIENT
Start: 2022-09-22 | End: 2022-10-04

## 2022-09-22 RX ORDER — POTASSIUM CHLORIDE 20 MEQ
40 PACKET (EA) ORAL EVERY 4 HOURS
Refills: 0 | Status: COMPLETED | OUTPATIENT
Start: 2022-09-22 | End: 2022-09-22

## 2022-09-22 RX ORDER — PANTOPRAZOLE SODIUM 20 MG/1
40 TABLET, DELAYED RELEASE ORAL
Refills: 0 | Status: DISCONTINUED | OUTPATIENT
Start: 2022-09-22 | End: 2022-10-04

## 2022-09-22 RX ORDER — LISINOPRIL 2.5 MG/1
10 TABLET ORAL DAILY
Refills: 0 | Status: DISCONTINUED | OUTPATIENT
Start: 2022-09-22 | End: 2022-10-04

## 2022-09-22 RX ORDER — SERTRALINE 25 MG/1
50 TABLET, FILM COATED ORAL DAILY
Refills: 0 | Status: DISCONTINUED | OUTPATIENT
Start: 2022-09-22 | End: 2022-10-04

## 2022-09-22 RX ORDER — DEXTROSE 50 % IN WATER 50 %
15 SYRINGE (ML) INTRAVENOUS ONCE
Refills: 0 | Status: DISCONTINUED | OUTPATIENT
Start: 2022-09-22 | End: 2022-10-04

## 2022-09-22 RX ORDER — MAGNESIUM OXIDE 400 MG ORAL TABLET 241.3 MG
400 TABLET ORAL ONCE
Refills: 0 | Status: COMPLETED | OUTPATIENT
Start: 2022-09-22 | End: 2022-09-22

## 2022-09-22 RX ORDER — HEPARIN SODIUM 5000 [USP'U]/ML
7500 INJECTION INTRAVENOUS; SUBCUTANEOUS EVERY 12 HOURS
Refills: 0 | Status: DISCONTINUED | OUTPATIENT
Start: 2022-09-22 | End: 2022-10-04

## 2022-09-22 RX ORDER — POTASSIUM CHLORIDE 20 MEQ
40 PACKET (EA) ORAL ONCE
Refills: 0 | Status: COMPLETED | OUTPATIENT
Start: 2022-09-22 | End: 2022-09-22

## 2022-09-22 RX ADMIN — HYDROMORPHONE HYDROCHLORIDE 1 MILLIGRAM(S): 2 INJECTION INTRAMUSCULAR; INTRAVENOUS; SUBCUTANEOUS at 22:49

## 2022-09-22 RX ADMIN — Medication 100 MILLIGRAM(S): at 22:22

## 2022-09-22 RX ADMIN — Medication 40 MILLIEQUIVALENT(S): at 22:21

## 2022-09-22 RX ADMIN — OXYCODONE HYDROCHLORIDE 15 MILLIGRAM(S): 5 TABLET ORAL at 04:31

## 2022-09-22 RX ADMIN — PANTOPRAZOLE SODIUM 40 MILLIGRAM(S): 20 TABLET, DELAYED RELEASE ORAL at 07:16

## 2022-09-22 RX ADMIN — Medication 100 MILLIGRAM(S): at 07:16

## 2022-09-22 RX ADMIN — MORPHINE SULFATE 8 MILLIGRAM(S): 50 CAPSULE, EXTENDED RELEASE ORAL at 04:42

## 2022-09-22 RX ADMIN — OXYCODONE HYDROCHLORIDE 15 MILLIGRAM(S): 5 TABLET ORAL at 11:59

## 2022-09-22 RX ADMIN — OXYCODONE HYDROCHLORIDE 15 MILLIGRAM(S): 5 TABLET ORAL at 11:15

## 2022-09-22 RX ADMIN — Medication 40 MILLIEQUIVALENT(S): at 04:31

## 2022-09-22 RX ADMIN — Medication 40 MILLIGRAM(S): at 07:22

## 2022-09-22 RX ADMIN — HEPARIN SODIUM 7500 UNIT(S): 5000 INJECTION INTRAVENOUS; SUBCUTANEOUS at 18:22

## 2022-09-22 RX ADMIN — Medication 80 MILLIGRAM(S): at 00:58

## 2022-09-22 RX ADMIN — OXYCODONE HYDROCHLORIDE 15 MILLIGRAM(S): 5 TABLET ORAL at 18:22

## 2022-09-22 RX ADMIN — Medication 40 MILLIGRAM(S): at 15:10

## 2022-09-22 RX ADMIN — HYDROMORPHONE HYDROCHLORIDE 1 MILLIGRAM(S): 2 INJECTION INTRAMUSCULAR; INTRAVENOUS; SUBCUTANEOUS at 22:22

## 2022-09-22 RX ADMIN — Medication 3 MILLIGRAM(S): at 22:22

## 2022-09-22 RX ADMIN — Medication 100 MILLIGRAM(S): at 15:13

## 2022-09-22 RX ADMIN — HEPARIN SODIUM 7500 UNIT(S): 5000 INJECTION INTRAVENOUS; SUBCUTANEOUS at 07:22

## 2022-09-22 RX ADMIN — LISINOPRIL 10 MILLIGRAM(S): 2.5 TABLET ORAL at 12:35

## 2022-09-22 RX ADMIN — Medication 40 MILLIEQUIVALENT(S): at 10:35

## 2022-09-22 RX ADMIN — SERTRALINE 50 MILLIGRAM(S): 25 TABLET, FILM COATED ORAL at 12:36

## 2022-09-22 RX ADMIN — OXYCODONE HYDROCHLORIDE 15 MILLIGRAM(S): 5 TABLET ORAL at 06:48

## 2022-09-22 RX ADMIN — MORPHINE SULFATE 8 MILLIGRAM(S): 50 CAPSULE, EXTENDED RELEASE ORAL at 00:58

## 2022-09-22 NOTE — PATIENT PROFILE ADULT - FALL HARM RISK - HARM RISK INTERVENTIONS

## 2022-09-22 NOTE — ED PROVIDER NOTE - OBJECTIVE STATEMENT
53 yo woman hx of DM, HTN, MI 6 years ago without procedures, intubated in July for viral infection after spinal cord stimulation procedure, presents from nursing home with one week worsening dyspnea with new oxygen requirements after having been weaned off to PRN oxygen for several weeks, needing 10L to sleep several nights ago, new progressive orthopnea, and 50 pound weight gain in two weeks in the context of reduced urine volume.  Patient take 40mg daily lasix, recently increased to twice daily. Patient with edema up to bilateral thighs and of bilateral hands and arms and in her face which was not present before. Patient 95% on 4L , 90% off oxygen per EMS.

## 2022-09-22 NOTE — H&P ADULT - NSHPPHYSICALEXAM_GEN_ALL_CORE
Physical exam:  General: morbidly obese female in no acute distress, resting comfortably  Head:  Atraumatic, Normocephalic  Eyes: EOMI, PERRLA, clear sclera  Neck: Supple, thyroid nontender, non enlarged  Cardio: S1/S2 +ve, regular rate and rhythm, no M/G/R  Resp: mild basilar rales - difficuly to auscultate due to body habitus   GI: abdomen soft, nontender, non distended, no guarding, BS +ve x 4  Ext: 3+ swelling bilaterally, RLE appears slightly larger  Neuro: CN 2-12 intact, no significant motor or sensory deficits.  Skin: No rashes or lesions

## 2022-09-22 NOTE — H&P ADULT - HISTORY OF PRESENT ILLNESS
Patient is a 52F with a PMH of chronic pain syndrome (spinal nerve stimulator placed 07/2022, morbid obesity, DM, HTN, CAD, IBS, pseudotumor cerebri who presents to the ED for dyspnea.  Patient states that over the last 3 weeks she has had increased swelling in her lower extremities (worse on the R side) and has also developed dyspnea and orthopnea.  Patient reportedly requiring increased oxygen concentrations at the NH - as high as 10L/min?  Patient concerned as she was started on lasix at the NH however her urination has not increased.  Patient also reports 15 lb weight gain in the last two weeks.  Presented to ED for evaluation.  Patient SpO2 currently 97% on 4L via NC.  Labs show leukocytosis and hypokalemia.  Will admit to med surg

## 2022-09-22 NOTE — H&P ADULT - NSHPLABSRESULTS_GEN_ALL_CORE
Recent Vitals  T(C): 36.9 (09-22-22 @ 03:21), Max: 36.9 (09-22-22 @ 03:21)  HR: 68 (09-22-22 @ 03:21) (68 - 75)  BP: 124/47 (09-22-22 @ 03:21) (120/48 - 126/65)  RR: 18 (09-22-22 @ 03:21) (17 - 18)  SpO2: 96% (09-22-22 @ 03:21) (95% - 96%)                        10.0   14.11 )-----------( 466      ( 22 Sep 2022 00:50 )             31.9     09-22    145  |  110<H>  |  13  ----------------------------<  64<L>  3.1<L>   |  27  |  0.85    Ca    7.6<L>      22 Sep 2022 00:50    TPro  7.2  /  Alb  2.9<L>  /  TBili  0.3  /  DBili  x   /  AST  16  /  ALT  24  /  AlkPhos  90  09-22      LIVER FUNCTIONS - ( 22 Sep 2022 00:50 )  Alb: 2.9 g/dL / Pro: 7.2 gm/dL / ALK PHOS: 90 U/L / ALT: 24 U/L / AST: 16 U/L / GGT: x               Home Medications:  benzonatate 100 mg oral capsule: 1 cap(s) orally 3 times a day (22 Jul 2022 16:48)  furosemide 40 mg oral tablet: 1 tab(s) orally once a day (22 Jul 2022 16:48)  guaiFENesin 1200 mg oral tablet, extended release: 1 tab(s) orally every 12 hours (22 Jul 2022 16:48)  lisinopril 10 mg oral tablet: 1 tab(s) orally once a day (22 Jul 2022 16:48)  mupirocin 2% topical ointment: 1 application topically 2 times a day (22 Jul 2022 16:48)  naloxegol 25 mg oral tablet: 1 tab(s) orally once a day (22 Jul 2022 16:48)  NIFEdipine 60 mg oral tablet, extended release: 1 tab(s) orally once a day (22 Jul 2022 16:48)  NovoLOG 100 units/mL injectable solution: 10 unit(s) subcutaneous 3 times a day (with meals) (11 Jul 2022 01:34)  oxyCODONE 15 mg oral tablet: 1 tab(s) orally every 4 hours, As needed, Severe Pain (7 - 10) (22 Jul 2022 16:48)  pantoprazole 40 mg oral delayed release tablet: 1 tab(s) orally once a day (before a meal) (22 Jul 2022 16:48)  pregabalin 100 mg oral capsule: 1 cap(s) orally every 8 hours (22 Jul 2022 16:48)  sertraline 50 mg oral tablet: 1 tab(s) orally once a day (22 Jul 2022 16:48)  Tresiba 100 units/mL subcutaneous solution: 80 unit(s) subcutaneous every 72 hours (11 Jul 2022 01:22)

## 2022-09-22 NOTE — ED ADULT NURSE NOTE - ED STAT RN HANDOFF DETAILS
Report is given to 1D holding nurse Sandee. Pt is aox3, on nc 4L, pt is on continues cardiac monitor.

## 2022-09-22 NOTE — H&P ADULT - NSHPREVIEWOFSYSTEMS_GEN_ALL_CORE
Constitutional: no fever, chills, night sweats  Ears: no hearing changes or ear pain,   Nose: no nasal congestion, sinus pain, or rhinorrhea  Cardio: orthopnea, edema positive.  No CP or palpitations  Resp: dyspnea positive.  No cough, wheezing  GI: no nausea, vomiting, diarrhea, constipation, hematochezia, or melena  : no dysuria, urinary frequency, hematuria  MSK: no back pain, neck pain  Skin: no rash, pruritis   Neuro: no weakness, dizziness, lightheadedness, syncope   Heme/Lymph: no bruising or bleeding

## 2022-09-22 NOTE — ED PROVIDER NOTE - CARDIAC, MLM
Normal rate, regular rhythm.  Heart sounds S1, S2.  No murmurs, rubs or gallops. Pitting edema to bilateral lower legs up to thighs, bilateral hands and forearms, and nonpitting edema to the face

## 2022-09-22 NOTE — ED PROVIDER NOTE - CLINICAL SUMMARY MEDICAL DECISION MAKING FREE TEXT BOX
53 yo woman hx of DM, HTN, MI 6 years ago without procedures, intubated in July for viral infection after spinal cord stimulation procedure, presents from nursing home with one week worsening dyspnea with new oxygen requirements after having been weaned off to PRN oxygen for several weeks, needing 10L to sleep several nights ago, new progressive orthopnea, and 50 pound weight gain in two weeks in the context of reduced urine volume.  Patient take 40mg daily lasix, recently increased to twice daily. Patient with edema up to bilateral thighs and of bilateral hands and arms and in her face which was not present before. Patient 95% on 4L , 90% off oxygen per EMS. Exam with bibasilar rales, mild respiratory distress, pitting edema up to bilateral thighs, of bilateral hands and forearms and of the face. CXR consistent with bilateral pulmonary edema. Consistent with fluid overload 2/2 CHF exacerbation vs low albumen. Screen for PE. Troponin negative. Flu and covid negative. Plan for admission for fluid overload 51 yo woman hx of DM, HTN, MI 6 years ago without procedures, intubated in July for viral infection after spinal cord stimulation procedure, presents from nursing home with one week worsening dyspnea with new oxygen requirements after having been weaned off to PRN oxygen for several weeks, needing 10L to sleep several nights ago, new progressive orthopnea, and 50 pound weight gain in two weeks in the context of reduced urine volume.  Patient take 40mg daily lasix, recently increased to twice daily. Patient with edema up to bilateral thighs and of bilateral hands and arms and in her face which was not present before. Patient 95% on 4L , 90% off oxygen per EMS. Exam with bibasilar rales, mild respiratory distress, pitting edema up to bilateral thighs, of bilateral hands and forearms and of the face. CXR consistent with bilateral pulmonary edema. Consistent with fluid overload 2/2 CHF exacerbation vs low albumen. Screen for PE. Troponin negative. Flu and covid negative. Plan for admission for fluid overload    Endorsed to Dr. Barker, hospitalist, who accepted patient for admission    Followup D dimer though CTA PE study maybe difficult given orthopnea.

## 2022-09-22 NOTE — H&P ADULT - ASSESSMENT
Patient is a 52F with a PMH of chronic pain syndrome (spinal nerve stimulator placed 07/2022, morbid obesity, DM, HTN, CAD, IBS, pseudotumor cerebri who presents to the ED for dyspnea.  Patient states that over the last 3 weeks she has had increased swelling in her lower extremities (worse on the R side) and has also developed dyspnea and orthopnea.  Patient reportedly requiring increased oxygen concentrations at the NH - as high as 10L/min?  Patient concerned as she was started on lasix at the NH however her urination has not increased.  Patient also reports 15 lb weight gain in the last two weeks.  Presented to ED for evaluation.  Patient SpO2 currently 97% on 4L via NC.  Labs show leukocytosis and hypokalemia.  Will admit to med surg    IMPROVE VTE Individual Risk Assessment          RISK                                                          Points  [  ] Previous VTE                                                3  [  ] Thrombophilia                                             2  [  ] Lower limb paralysis                                    2        (unable to hold up >15 seconds)    [  ] Current Cancer                                             2         (within 6 months)  [  ] Immobilization > 24 hrs                              1  [  ] ICU/CCU stay > 24 hours                            1  [  ] Age > 60                                                    1    IMPROVE VTE Score - 1

## 2022-09-23 DIAGNOSIS — E66.01 MORBID (SEVERE) OBESITY DUE TO EXCESS CALORIES: ICD-10-CM

## 2022-09-23 LAB
A1C WITH ESTIMATED AVERAGE GLUCOSE RESULT: 7 % — HIGH (ref 4–5.6)
ALBUMIN SERPL ELPH-MCNC: 2.7 G/DL — LOW (ref 3.3–5)
ALP SERPL-CCNC: 93 U/L — SIGNIFICANT CHANGE UP (ref 40–120)
ALT FLD-CCNC: 26 U/L — SIGNIFICANT CHANGE UP (ref 12–78)
ANION GAP SERPL CALC-SCNC: 7 MMOL/L — SIGNIFICANT CHANGE UP (ref 5–17)
AST SERPL-CCNC: 18 U/L — SIGNIFICANT CHANGE UP (ref 15–37)
BASE EXCESS BLDA CALC-SCNC: 6.6 MMOL/L — HIGH (ref -2–3)
BILIRUB SERPL-MCNC: 0.6 MG/DL — SIGNIFICANT CHANGE UP (ref 0.2–1.2)
BLOOD GAS COMMENTS ARTERIAL: SIGNIFICANT CHANGE UP
BUN SERPL-MCNC: 13 MG/DL — SIGNIFICANT CHANGE UP (ref 7–23)
CALCIUM SERPL-MCNC: 7.5 MG/DL — LOW (ref 8.5–10.1)
CHLORIDE SERPL-SCNC: 106 MMOL/L — SIGNIFICANT CHANGE UP (ref 96–108)
CO2 BLDA-SCNC: 33 MMOL/L — HIGH (ref 19–24)
CO2 SERPL-SCNC: 29 MMOL/L — SIGNIFICANT CHANGE UP (ref 22–31)
CREAT SERPL-MCNC: 0.79 MG/DL — SIGNIFICANT CHANGE UP (ref 0.5–1.3)
EGFR: 90 ML/MIN/1.73M2 — SIGNIFICANT CHANGE UP
ESTIMATED AVERAGE GLUCOSE: 154 MG/DL — HIGH (ref 68–114)
GAS PNL BLDA: SIGNIFICANT CHANGE UP
GLUCOSE BLDC GLUCOMTR-MCNC: 123 MG/DL — HIGH (ref 70–99)
GLUCOSE BLDC GLUCOMTR-MCNC: 150 MG/DL — HIGH (ref 70–99)
GLUCOSE BLDC GLUCOMTR-MCNC: 238 MG/DL — HIGH (ref 70–99)
GLUCOSE BLDC GLUCOMTR-MCNC: 94 MG/DL — SIGNIFICANT CHANGE UP (ref 70–99)
GLUCOSE SERPL-MCNC: 95 MG/DL — SIGNIFICANT CHANGE UP (ref 70–99)
HCO3 BLDA-SCNC: 31 MMOL/L — HIGH (ref 21–28)
HCT VFR BLD CALC: 32.4 % — LOW (ref 34.5–45)
HGB BLD-MCNC: 10 G/DL — LOW (ref 11.5–15.5)
HOROWITZ INDEX BLDA+IHG-RTO: 21 — SIGNIFICANT CHANGE UP
MAGNESIUM SERPL-MCNC: 1.3 MG/DL — LOW (ref 1.6–2.6)
MCHC RBC-ENTMCNC: 28 PG — SIGNIFICANT CHANGE UP (ref 27–34)
MCHC RBC-ENTMCNC: 30.9 G/DL — LOW (ref 32–36)
MCV RBC AUTO: 90.8 FL — SIGNIFICANT CHANGE UP (ref 80–100)
NRBC # BLD: 0 /100 WBCS — SIGNIFICANT CHANGE UP (ref 0–0)
PCO2 BLDA: 44 MMHG — SIGNIFICANT CHANGE UP (ref 32–46)
PH BLDA: 7.46 — HIGH (ref 7.35–7.45)
PHOSPHATE SERPL-MCNC: 4.9 MG/DL — HIGH (ref 2.5–4.5)
PLATELET # BLD AUTO: 443 K/UL — HIGH (ref 150–400)
PO2 BLDA: 61 MMHG — LOW (ref 83–108)
POTASSIUM SERPL-MCNC: 3.7 MMOL/L — SIGNIFICANT CHANGE UP (ref 3.5–5.3)
POTASSIUM SERPL-SCNC: 3.7 MMOL/L — SIGNIFICANT CHANGE UP (ref 3.5–5.3)
PROT SERPL-MCNC: 7.4 GM/DL — SIGNIFICANT CHANGE UP (ref 6–8.3)
RBC # BLD: 3.57 M/UL — LOW (ref 3.8–5.2)
RBC # FLD: 15.1 % — HIGH (ref 10.3–14.5)
SAO2 % BLDA: 93.3 % — LOW (ref 94–98)
SODIUM SERPL-SCNC: 142 MMOL/L — SIGNIFICANT CHANGE UP (ref 135–145)
WBC # BLD: 10.68 K/UL — HIGH (ref 3.8–10.5)
WBC # FLD AUTO: 10.68 K/UL — HIGH (ref 3.8–10.5)

## 2022-09-23 PROCEDURE — 71275 CT ANGIOGRAPHY CHEST: CPT | Mod: 26

## 2022-09-23 PROCEDURE — 99222 1ST HOSP IP/OBS MODERATE 55: CPT

## 2022-09-23 PROCEDURE — 99233 SBSQ HOSP IP/OBS HIGH 50: CPT

## 2022-09-23 PROCEDURE — 93970 EXTREMITY STUDY: CPT | Mod: 26

## 2022-09-23 RX ORDER — TIOTROPIUM BROMIDE 18 UG/1
1 CAPSULE ORAL; RESPIRATORY (INHALATION) DAILY
Refills: 0 | Status: DISCONTINUED | OUTPATIENT
Start: 2022-09-23 | End: 2022-10-04

## 2022-09-23 RX ORDER — OXYCODONE HYDROCHLORIDE 5 MG/1
15 TABLET ORAL EVERY 4 HOURS
Refills: 0 | Status: DISCONTINUED | OUTPATIENT
Start: 2022-09-23 | End: 2022-09-30

## 2022-09-23 RX ORDER — OXYCODONE HYDROCHLORIDE 5 MG/1
30 TABLET ORAL EVERY 4 HOURS
Refills: 0 | Status: DISCONTINUED | OUTPATIENT
Start: 2022-09-23 | End: 2022-09-30

## 2022-09-23 RX ORDER — MAGNESIUM SULFATE 500 MG/ML
2 VIAL (ML) INJECTION ONCE
Refills: 0 | Status: COMPLETED | OUTPATIENT
Start: 2022-09-23 | End: 2022-09-23

## 2022-09-23 RX ORDER — MAGNESIUM SULFATE 500 MG/ML
1 VIAL (ML) INJECTION ONCE
Refills: 0 | Status: DISCONTINUED | OUTPATIENT
Start: 2022-09-23 | End: 2022-09-23

## 2022-09-23 RX ORDER — MORPHINE SULFATE 50 MG/1
4 CAPSULE, EXTENDED RELEASE ORAL ONCE
Refills: 0 | Status: DISCONTINUED | OUTPATIENT
Start: 2022-09-23 | End: 2022-09-23

## 2022-09-23 RX ORDER — BACLOFEN 100 %
5 POWDER (GRAM) MISCELLANEOUS EVERY 8 HOURS
Refills: 0 | Status: DISCONTINUED | OUTPATIENT
Start: 2022-09-23 | End: 2022-10-04

## 2022-09-23 RX ADMIN — OXYCODONE HYDROCHLORIDE 30 MILLIGRAM(S): 5 TABLET ORAL at 22:06

## 2022-09-23 RX ADMIN — MORPHINE SULFATE 4 MILLIGRAM(S): 50 CAPSULE, EXTENDED RELEASE ORAL at 18:10

## 2022-09-23 RX ADMIN — TIOTROPIUM BROMIDE 1 CAPSULE(S): 18 CAPSULE ORAL; RESPIRATORY (INHALATION) at 18:06

## 2022-09-23 RX ADMIN — SERTRALINE 50 MILLIGRAM(S): 25 TABLET, FILM COATED ORAL at 13:03

## 2022-09-23 RX ADMIN — Medication 40 MILLIGRAM(S): at 06:13

## 2022-09-23 RX ADMIN — OXYCODONE HYDROCHLORIDE 15 MILLIGRAM(S): 5 TABLET ORAL at 14:30

## 2022-09-23 RX ADMIN — Medication 100 MILLIGRAM(S): at 06:11

## 2022-09-23 RX ADMIN — OXYCODONE HYDROCHLORIDE 15 MILLIGRAM(S): 5 TABLET ORAL at 06:11

## 2022-09-23 RX ADMIN — LISINOPRIL 10 MILLIGRAM(S): 2.5 TABLET ORAL at 06:12

## 2022-09-23 RX ADMIN — MORPHINE SULFATE 4 MILLIGRAM(S): 50 CAPSULE, EXTENDED RELEASE ORAL at 19:22

## 2022-09-23 RX ADMIN — Medication 100 MILLIGRAM(S): at 18:06

## 2022-09-23 RX ADMIN — MAGNESIUM OXIDE 400 MG ORAL TABLET 400 MILLIGRAM(S): 241.3 TABLET ORAL at 00:09

## 2022-09-23 RX ADMIN — Medication 3 MILLIGRAM(S): at 23:22

## 2022-09-23 RX ADMIN — OXYCODONE HYDROCHLORIDE 30 MILLIGRAM(S): 5 TABLET ORAL at 23:13

## 2022-09-23 RX ADMIN — Medication 40 MILLIGRAM(S): at 13:02

## 2022-09-23 RX ADMIN — OXYCODONE HYDROCHLORIDE 15 MILLIGRAM(S): 5 TABLET ORAL at 13:02

## 2022-09-23 RX ADMIN — PANTOPRAZOLE SODIUM 40 MILLIGRAM(S): 20 TABLET, DELAYED RELEASE ORAL at 06:14

## 2022-09-23 RX ADMIN — HEPARIN SODIUM 7500 UNIT(S): 5000 INJECTION INTRAVENOUS; SUBCUTANEOUS at 06:13

## 2022-09-23 RX ADMIN — OXYCODONE HYDROCHLORIDE 15 MILLIGRAM(S): 5 TABLET ORAL at 06:50

## 2022-09-23 RX ADMIN — Medication 60 MILLIGRAM(S): at 06:13

## 2022-09-23 RX ADMIN — HEPARIN SODIUM 7500 UNIT(S): 5000 INJECTION INTRAVENOUS; SUBCUTANEOUS at 18:07

## 2022-09-23 RX ADMIN — Medication 25 GRAM(S): at 20:26

## 2022-09-23 NOTE — PROGRESS NOTE ADULT - ASSESSMENT
Patient is a 52F with a PMH of chronic pain syndrome (spinal nerve stimulator placed 07/2022, morbid obesity, DM, HTN, CAD, IBS, pseudotumor cerebri who presents to the ED for dyspnea.  Patient states that over the last 3 weeks she has had increased swelling in her lower extremities (worse on the R side) and has also developed dyspnea and orthopnea.  Patient reportedly requiring increased oxygen concentrations at the NH - as high as 10L/min?  Patient concerned as she was started on lasix at the NH however her urination has not increased.  Patient also reports 15 lb weight gain in the last two weeks.  Presented to ED for evaluation.  Patient SpO2 currently 97% on 4L via NC.  Labs show leukocytosis and hypokalemia.  Will admit to med surg      Respiratory failure with chronic pain may be a non-covid virus     Chronic pain despite nerve stimulator

## 2022-09-23 NOTE — DIETITIAN INITIAL EVALUATION ADULT - PERTINENT LABORATORY DATA
09-23    142  |  106  |  13  ----------------------------<  95  3.7   |  29  |  0.79    Ca    7.5<L>      23 Sep 2022 07:45  Phos  4.9     09-23  Mg     1.3     09-23    TPro  7.4  /  Alb  2.7<L>  /  TBili  0.6  /  DBili  x   /  AST  18  /  ALT  26  /  AlkPhos  93  09-23  POCT Blood Glucose.: 150 mg/dL (09-23-22 @ 11:49)  A1C with Estimated Average Glucose Result: 7.0 % (09-23-22 @ 07:45)  A1C with Estimated Average Glucose Result: 7.5 % (07-13-22 @ 00:18)

## 2022-09-23 NOTE — DIETITIAN INITIAL EVALUATION ADULT - PERSON TAUGHT/METHOD
verbal instruction/written material/teach back - (Patient repeats in own words)/ask me 3/patient instructed

## 2022-09-23 NOTE — DIETITIAN INITIAL EVALUATION ADULT - PERTINENT MEDS FT
MEDICATIONS  (STANDING):  dextrose 5%. 1000 milliLiter(s) (50 mL/Hr) IV Continuous <Continuous>  dextrose 5%. 1000 milliLiter(s) (100 mL/Hr) IV Continuous <Continuous>  dextrose 50% Injectable 25 Gram(s) IV Push once  dextrose 50% Injectable 12.5 Gram(s) IV Push once  dextrose 50% Injectable 25 Gram(s) IV Push once  furosemide   Injectable 40 milliGRAM(s) IV Push two times a day  glucagon  Injectable 1 milliGRAM(s) IntraMuscular once  heparin   Injectable 7500 Unit(s) SubCutaneous every 12 hours  influenza   Vaccine 0.5 milliLiter(s) IntraMuscular once  insulin lispro (ADMELOG) corrective regimen sliding scale   SubCutaneous three times a day before meals  lisinopril 10 milliGRAM(s) Oral daily  NIFEdipine XL 60 milliGRAM(s) Oral daily  pantoprazole    Tablet 40 milliGRAM(s) Oral before breakfast  pregabalin 100 milliGRAM(s) Oral every 8 hours  sertraline 50 milliGRAM(s) Oral daily  tiotropium 18 MICROgram(s) Capsule 1 Capsule(s) Inhalation daily    MEDICATIONS  (PRN):  dextrose Oral Gel 15 Gram(s) Oral once PRN Blood Glucose LESS THAN 70 milliGRAM(s)/deciliter  melatonin 3 milliGRAM(s) Oral at bedtime PRN Sleep  oxyCODONE    IR 15 milliGRAM(s) Oral every 6 hours PRN Severe Pain (7 - 10)  simethicone 80 milliGRAM(s) Chew three times a day PRN Gas

## 2022-09-23 NOTE — PROGRESS NOTE ADULT - SUBJECTIVE AND OBJECTIVE BOX
HPI:  Patient is a 52F with a PMH of chronic pain syndrome (spinal nerve stimulator placed 07/2022, morbid obesity, DM, HTN, CAD, IBS, pseudotumor cerebri who presents to the ED for dyspnea.  Patient states that over the last 3 weeks she has had increased swelling in her lower extremities (worse on the R side) and has also developed dyspnea and orthopnea.  Patient reportedly requiring increased oxygen concentrations at the NH - as high as 10L/min?  Patient concerned as she was started on lasix at the NH however her urination has not increased.  Patient also reports 15 lb weight gain in the last two weeks.  Presented to ED for evaluation.  Patient SpO2 currently 97% on 4L via NC.  Labs show leukocytosis and hypokalemia.  Will admit to med surg   (22 Sep 2022 03:51)    Patient is a 52y old  Female who presents with a chief complaint of FLUID OVERLOAD     (23 Sep 2022 15:04)      INTERVAL HPI/OVERNIGHT EVENTS: requiring oxygen and bipap at night complains of leg spasm    MEDICATIONS  (STANDING):  dextrose 5%. 1000 milliLiter(s) (50 mL/Hr) IV Continuous <Continuous>  dextrose 5%. 1000 milliLiter(s) (100 mL/Hr) IV Continuous <Continuous>  dextrose 50% Injectable 25 Gram(s) IV Push once  dextrose 50% Injectable 12.5 Gram(s) IV Push once  dextrose 50% Injectable 25 Gram(s) IV Push once  furosemide   Injectable 40 milliGRAM(s) IV Push two times a day  glucagon  Injectable 1 milliGRAM(s) IntraMuscular once  heparin   Injectable 7500 Unit(s) SubCutaneous every 12 hours  influenza   Vaccine 0.5 milliLiter(s) IntraMuscular once  insulin lispro (ADMELOG) corrective regimen sliding scale   SubCutaneous three times a day before meals  lisinopril 10 milliGRAM(s) Oral daily  magnesium sulfate  IVPB 2 Gram(s) IV Intermittent once  NIFEdipine XL 60 milliGRAM(s) Oral daily  pantoprazole    Tablet 40 milliGRAM(s) Oral before breakfast  pregabalin 100 milliGRAM(s) Oral every 8 hours  sertraline 50 milliGRAM(s) Oral daily  tiotropium 18 MICROgram(s) Capsule 1 Capsule(s) Inhalation daily    MEDICATIONS  (PRN):  baclofen 5 milliGRAM(s) Oral every 8 hours PRN Muscle Spasm  dextrose Oral Gel 15 Gram(s) Oral once PRN Blood Glucose LESS THAN 70 milliGRAM(s)/deciliter  melatonin 3 milliGRAM(s) Oral at bedtime PRN Sleep  morphine  - Injectable 4 milliGRAM(s) IV Push once PRN breakthrough pain  oxyCODONE    IR 30 milliGRAM(s) Oral every 4 hours PRN Severe Pain (7 - 10)  oxyCODONE    IR 15 milliGRAM(s) Oral every 4 hours PRN Moderate Pain (4 - 6)  simethicone 80 milliGRAM(s) Chew three times a day PRN Gas      Allergies    amitriptyline (Other)    Intolerances        REVIEW OF SYSTEMS:  CONSTITUTIONAL:  fatigue  EYES: No eye pain, visual disturbances, or discharge  ENMT:  No difficulty hearing, tinnitus, vertigo; No sinus or throat pain  NECK: No pain or stiffness  BREASTS: No pain, masses, or nipple discharge  RESPIRATORY:  cough, wheezing, shortness of breath  CARDIOVASCULAR: No chest pain, palpitations, dizziness, or leg swelling  GASTROINTESTINAL: No abdominal or epigastric pain. No nausea, vomiting, or hematemesis; No diarrhea or constipation. No melena or hematochezia.  GENITOURINARY: No dysuria, frequency, hematuria, or incontinence  NEUROLOGICAL: No headaches, memory loss, loss of strength, numbness, or tremors  SKIN: No itching, burning, rashes, or lesions   LYMPH NODES: No enlarged glands  ENDOCRINE: No heat or cold intolerance; No hair loss  MUSCULOSKELETAL: back and leg pain   PSYCHIATRIC: No depression, anxiety, mood swings, or difficulty sleeping  HEME/LYMPH: No easy bruising, or bleeding gums  ALLERGY AND IMMUNOLOGIC: No hives or eczema    Vital Signs Last 24 Hrs  T(C): 37.1 (23 Sep 2022 12:13), Max: 37.3 (22 Sep 2022 23:58)  T(F): 98.7 (23 Sep 2022 12:13), Max: 99.1 (22 Sep 2022 23:58)  HR: 68 (23 Sep 2022 12:13) (65 - 71)  BP: 114/70 (23 Sep 2022 12:13) (114/68 - 126/87)  RR: 68 (23 Sep 2022 12:13) (18 - 68)  SpO2: 98% (23 Sep 2022 12:13) (96% - 100%)    Parameters below as of 23 Sep 2022 12:13  Patient On (Oxygen Delivery Method): nasal cannula        PHYSICAL EXAM:  GENERAL: NAD,morbidly obese   HEAD:  Atraumatic, Normocephalic  EYES: EOMI, PERRLA, conjunctiva and sclera clear  ENMT: No tonsillar erythema, exudates, or enlargement; Moist mucous membranes, Good dentition, No lesions  NECK: Supple, No JVD, Normal thyroid  NERVOUS SYSTEM:  Alert & Oriented X3, Good concentration; Motor Strength 5/5 B/L upper and lower extremities; DTRs 2+ intact and symmetric  CHEST/LUNG: Clear to ascultation  bilaterally; No rales, rhonchi, wheezing, or rubs  HEART: Regular rate and rhythm; No murmurs, rubs, or gallops  ABDOMEN: Soft, Nontender, Nondistended; Bowel sounds present  EXTREMITIES:   edema leg tenderness   LYMPH: No lymphadenopathy noted  SKIN: No rashes or lesions    LABS:                        10.0   10.68 )-----------( 443      ( 23 Sep 2022 07:45 )             32.4     09-23    142  |  106  |  13  ----------------------------<  95  3.7   |  29  |  0.79    Ca    7.5<L>      23 Sep 2022 07:45  Phos  4.9     09-23  Mg     1.3     09-23    TPro  7.4  /  Alb  2.7<L>  /  TBili  0.6  /  DBili  x   /  AST  18  /  ALT  26  /  AlkPhos  93  09-23        CAPILLARY BLOOD GLUCOSE      POCT Blood Glucose.: 123 mg/dL (23 Sep 2022 17:51)  POCT Blood Glucose.: 150 mg/dL (23 Sep 2022 11:49)  POCT Blood Glucose.: 94 mg/dL (23 Sep 2022 08:50)  POCT Blood Glucose.: 141 mg/dL (22 Sep 2022 21:46)      RADIOLOGY & ADDITIONAL TESTS:  < from: US Duplex Venous Lower Ext Complete, Bilateral (09.23.22 @ 15:59) >  INTERPRETATION:  CLINICAL INFORMATION: Leg swelling    COMPARISON: 8/29/2022    TECHNIQUE: Duplex sonography of the BILATERAL LOWER extremity veins with   color and spectral Doppler, with and without compression.    FINDINGS:    RIGHT:  Normal compressibility of the RIGHT common femoral, femoral and popliteal   veins.  Doppler examination shows normal spontaneous and phasic flow.  No RIGHT calf vein thrombosis is detected.    LEFT:  Normal compressibility of the LEFT common femoral, femoral and popliteal   veins.  Doppler examination shows normal spontaneous and phasic flow.  No LEFT calf vein thrombosis is detected.    There isbilateral lower extremity soft tissue edema.    IMPRESSION:  No evidence of deep venous thrombosis in either lower extremity.    < end of copied text >  < from: CT Angio Chest PE Protocol w/ IV Cont (09.23.22 @ 15:15) >  IMPRESSION:  No evidence of central or lobar pulmonary emboli.    A combination of groundglass opacity and patchy airspace   disease/atelectasis involving the lungs bilaterally along with trace to   small pleural effusions.      < end of copied text >    Imaging Personally Reviewed:  [X ] YES  [ ] NO    Consultant(s) Notes Reviewed:  [X ] YES  [ ] NO    Care Discussed with Consultants/Other Providers [ X] YES  [ ] NO

## 2022-09-23 NOTE — PHYSICAL THERAPY INITIAL EVALUATION ADULT - ADDITIONAL COMMENTS
53y/o female dx  with fluid over load, admitted to [facility], pt encountered in bed supine,aaox4. pt obese, NAD on 4l/min nco2 able to follow v.c,+ cardiac monitor, + primafit. pt require mod a for bed mob and transfers, able to amb with RW x 20ft, presents with short steps and slow aric, pt transferred to bathroom for ADL. pt uses constant o2 at 4l/min.

## 2022-09-23 NOTE — DIETITIAN INITIAL EVALUATION ADULT - NSFNSPHYEXAMSKINFT_GEN_A_CORE
Pressure Injury 1: sacral spine, Unstageable  Pressure Injury 2: Right:,foot, Healed  Pressure Injury 3: none, none  Pressure Injury 4: none, none  Pressure Injury 5: none, none  Pressure Injury 6: none, none  Pressure Injury 7: none, none  Pressure Injury 8: none, none  Pressure Injury 9: none, none  Pressure Injury 10: none, none  Pressure Injury 11: none, none

## 2022-09-23 NOTE — DIETITIAN INITIAL EVALUATION ADULT - OTHER INFO
Pt seen on medical floor w/ SOB ; COPD w/ PMH DM ; HTN ; MI 6 years ago. Intubated in July for viral infection after spinal cord stimulation procedure procedure. Adm from Nursing Home w/ one week worsening Dyspnea , 50 # wt gain x 2 weeks in the context of reduced urine volume. Pt had been taking lasix mg and recently increased to 2x/d. Pt without c/o at this time. Denies N/V/D/C/Chewing/Swallowing issues, No food allergies. Pt receptive to nutritional supplement for wound healing. Aware of foods containing Carbohydrates. 7/13 - HgbA1c = 7.5 %. Pt sing a wheelchair and Rolator to get around. Provided and educated pt w/ literature on What is My A1c ; Blood glucose goals ; My plate ; Pressure ulcer nutrition therapy.

## 2022-09-24 LAB
ALBUMIN SERPL ELPH-MCNC: 2.7 G/DL — LOW (ref 3.3–5)
ALP SERPL-CCNC: 89 U/L — SIGNIFICANT CHANGE UP (ref 40–120)
ALT FLD-CCNC: 22 U/L — SIGNIFICANT CHANGE UP (ref 12–78)
ANION GAP SERPL CALC-SCNC: 7 MMOL/L — SIGNIFICANT CHANGE UP (ref 5–17)
AST SERPL-CCNC: 17 U/L — SIGNIFICANT CHANGE UP (ref 15–37)
BASE EXCESS BLDA CALC-SCNC: 6.9 MMOL/L — HIGH (ref -2–3)
BILIRUB SERPL-MCNC: 0.8 MG/DL — SIGNIFICANT CHANGE UP (ref 0.2–1.2)
BLOOD GAS COMMENTS ARTERIAL: SIGNIFICANT CHANGE UP
BUN SERPL-MCNC: 14 MG/DL — SIGNIFICANT CHANGE UP (ref 7–23)
CALCIUM SERPL-MCNC: 7.6 MG/DL — LOW (ref 8.5–10.1)
CHLORIDE SERPL-SCNC: 102 MMOL/L — SIGNIFICANT CHANGE UP (ref 96–108)
CO2 BLDA-SCNC: 34 MMOL/L — HIGH (ref 19–24)
CO2 SERPL-SCNC: 31 MMOL/L — SIGNIFICANT CHANGE UP (ref 22–31)
CREAT SERPL-MCNC: 0.85 MG/DL — SIGNIFICANT CHANGE UP (ref 0.5–1.3)
EGFR: 82 ML/MIN/1.73M2 — SIGNIFICANT CHANGE UP
GAS PNL BLDA: SIGNIFICANT CHANGE UP
GLUCOSE BLDC GLUCOMTR-MCNC: 158 MG/DL — HIGH (ref 70–99)
GLUCOSE BLDC GLUCOMTR-MCNC: 197 MG/DL — HIGH (ref 70–99)
GLUCOSE SERPL-MCNC: 139 MG/DL — HIGH (ref 70–99)
HCO3 BLDA-SCNC: 32 MMOL/L — HIGH (ref 21–28)
HCT VFR BLD CALC: 31.9 % — LOW (ref 34.5–45)
HGB BLD-MCNC: 9.8 G/DL — LOW (ref 11.5–15.5)
HOROWITZ INDEX BLDA+IHG-RTO: 60 — SIGNIFICANT CHANGE UP
MAGNESIUM SERPL-MCNC: 1.9 MG/DL — SIGNIFICANT CHANGE UP (ref 1.6–2.6)
MCHC RBC-ENTMCNC: 28.2 PG — SIGNIFICANT CHANGE UP (ref 27–34)
MCHC RBC-ENTMCNC: 30.7 G/DL — LOW (ref 32–36)
MCV RBC AUTO: 91.7 FL — SIGNIFICANT CHANGE UP (ref 80–100)
NRBC # BLD: 0 /100 WBCS — SIGNIFICANT CHANGE UP (ref 0–0)
PCO2 BLDA: 49 MMHG — HIGH (ref 32–46)
PH BLDA: 7.43 — SIGNIFICANT CHANGE UP (ref 7.35–7.45)
PHOSPHATE SERPL-MCNC: 4.3 MG/DL — SIGNIFICANT CHANGE UP (ref 2.5–4.5)
PLATELET # BLD AUTO: 410 K/UL — HIGH (ref 150–400)
PO2 BLDA: 147 MMHG — HIGH (ref 83–108)
POTASSIUM SERPL-MCNC: 3.5 MMOL/L — SIGNIFICANT CHANGE UP (ref 3.5–5.3)
POTASSIUM SERPL-SCNC: 3.5 MMOL/L — SIGNIFICANT CHANGE UP (ref 3.5–5.3)
PROCALCITONIN SERPL-MCNC: <0.02 NG/ML — SIGNIFICANT CHANGE UP (ref 0.02–0.1)
PROT SERPL-MCNC: 7.4 GM/DL — SIGNIFICANT CHANGE UP (ref 6–8.3)
RAPID RVP RESULT: SIGNIFICANT CHANGE UP
RBC # BLD: 3.48 M/UL — LOW (ref 3.8–5.2)
RBC # FLD: 15 % — HIGH (ref 10.3–14.5)
SAO2 % BLDA: 98.5 % — HIGH (ref 94–98)
SARS-COV-2 RNA SPEC QL NAA+PROBE: SIGNIFICANT CHANGE UP
SODIUM SERPL-SCNC: 140 MMOL/L — SIGNIFICANT CHANGE UP (ref 135–145)
WBC # BLD: 9.82 K/UL — SIGNIFICANT CHANGE UP (ref 3.8–10.5)
WBC # FLD AUTO: 9.82 K/UL — SIGNIFICANT CHANGE UP (ref 3.8–10.5)

## 2022-09-24 PROCEDURE — 99232 SBSQ HOSP IP/OBS MODERATE 35: CPT

## 2022-09-24 PROCEDURE — 99233 SBSQ HOSP IP/OBS HIGH 50: CPT

## 2022-09-24 RX ADMIN — Medication 100 MILLIGRAM(S): at 06:25

## 2022-09-24 RX ADMIN — TIOTROPIUM BROMIDE 1 CAPSULE(S): 18 CAPSULE ORAL; RESPIRATORY (INHALATION) at 10:54

## 2022-09-24 RX ADMIN — SERTRALINE 50 MILLIGRAM(S): 25 TABLET, FILM COATED ORAL at 10:54

## 2022-09-24 RX ADMIN — Medication 1: at 11:24

## 2022-09-24 RX ADMIN — HEPARIN SODIUM 7500 UNIT(S): 5000 INJECTION INTRAVENOUS; SUBCUTANEOUS at 17:10

## 2022-09-24 RX ADMIN — OXYCODONE HYDROCHLORIDE 30 MILLIGRAM(S): 5 TABLET ORAL at 18:37

## 2022-09-24 RX ADMIN — Medication 100 MILLIGRAM(S): at 21:08

## 2022-09-24 RX ADMIN — Medication 3: at 16:35

## 2022-09-24 RX ADMIN — HEPARIN SODIUM 7500 UNIT(S): 5000 INJECTION INTRAVENOUS; SUBCUTANEOUS at 06:26

## 2022-09-24 RX ADMIN — PANTOPRAZOLE SODIUM 40 MILLIGRAM(S): 20 TABLET, DELAYED RELEASE ORAL at 06:25

## 2022-09-24 RX ADMIN — OXYCODONE HYDROCHLORIDE 30 MILLIGRAM(S): 5 TABLET ORAL at 12:00

## 2022-09-24 RX ADMIN — Medication 100 MILLIGRAM(S): at 14:20

## 2022-09-24 RX ADMIN — Medication 5 MILLIGRAM(S): at 17:10

## 2022-09-24 RX ADMIN — LISINOPRIL 10 MILLIGRAM(S): 2.5 TABLET ORAL at 06:25

## 2022-09-24 RX ADMIN — OXYCODONE HYDROCHLORIDE 30 MILLIGRAM(S): 5 TABLET ORAL at 10:54

## 2022-09-24 RX ADMIN — Medication 40 MILLIGRAM(S): at 17:11

## 2022-09-24 RX ADMIN — OXYCODONE HYDROCHLORIDE 30 MILLIGRAM(S): 5 TABLET ORAL at 19:51

## 2022-09-24 RX ADMIN — Medication 40 MILLIGRAM(S): at 06:25

## 2022-09-24 RX ADMIN — Medication 1: at 07:56

## 2022-09-24 NOTE — PROGRESS NOTE ADULT - SUBJECTIVE AND OBJECTIVE BOX
Patient is a 52y old  Female who presents with a chief complaint of dyspnea, orthopnea (23 Sep 2022 18:01)    INTERVAL HPI/OVERNIGHT EVENTS: no events     MEDICATIONS  (STANDING):  dextrose 5%. 1000 milliLiter(s) (50 mL/Hr) IV Continuous <Continuous>  dextrose 5%. 1000 milliLiter(s) (100 mL/Hr) IV Continuous <Continuous>  dextrose 50% Injectable 25 Gram(s) IV Push once  dextrose 50% Injectable 12.5 Gram(s) IV Push once  dextrose 50% Injectable 25 Gram(s) IV Push once  furosemide   Injectable 40 milliGRAM(s) IV Push two times a day  glucagon  Injectable 1 milliGRAM(s) IntraMuscular once  heparin   Injectable 7500 Unit(s) SubCutaneous every 12 hours  influenza   Vaccine 0.5 milliLiter(s) IntraMuscular once  insulin lispro (ADMELOG) corrective regimen sliding scale   SubCutaneous three times a day before meals  lisinopril 10 milliGRAM(s) Oral daily  NIFEdipine XL 60 milliGRAM(s) Oral daily  pantoprazole    Tablet 40 milliGRAM(s) Oral before breakfast  pregabalin 100 milliGRAM(s) Oral every 8 hours  sertraline 50 milliGRAM(s) Oral daily  tiotropium 18 MICROgram(s) Capsule 1 Capsule(s) Inhalation daily    MEDICATIONS  (PRN):  baclofen 5 milliGRAM(s) Oral every 8 hours PRN Muscle Spasm  dextrose Oral Gel 15 Gram(s) Oral once PRN Blood Glucose LESS THAN 70 milliGRAM(s)/deciliter  melatonin 3 milliGRAM(s) Oral at bedtime PRN Sleep  oxyCODONE    IR 30 milliGRAM(s) Oral every 4 hours PRN Severe Pain (7 - 10)  oxyCODONE    IR 15 milliGRAM(s) Oral every 4 hours PRN Moderate Pain (4 - 6)  simethicone 80 milliGRAM(s) Chew three times a day PRN Gas    Allergies    amitriptyline (Other)    Intolerances      REVIEW OF SYSTEMS:  All other systems reviewed and are negative    Vital Signs Last 24 Hrs  T(C): 36.8 (24 Sep 2022 05:33), Max: 37.1 (23 Sep 2022 12:13)  T(F): 98.2 (24 Sep 2022 05:33), Max: 98.7 (23 Sep 2022 12:13)  HR: 61 (24 Sep 2022 08:36) (61 - 89)  BP: 116/77 (24 Sep 2022 05:33) (106/59 - 150/81)  BP(mean): --  RR: 18 (24 Sep 2022 05:33) (18 - 68)  SpO2: 96% (24 Sep 2022 08:36) (94% - 100%)    Parameters below as of 24 Sep 2022 08:36  Patient On (Oxygen Delivery Method): nasal cannula w/ humidification,2  O2 Flow (L/min): 2    Daily     Daily   I&O's Summary    23 Sep 2022 07:01  -  24 Sep 2022 07:00  --------------------------------------------------------  IN: 540 mL / OUT: 0 mL / NET: 540 mL    24 Sep 2022 07:01  -  24 Sep 2022 11:26  --------------------------------------------------------  IN: 0 mL / OUT: 2000 mL / NET: -2000 mL      CAPILLARY BLOOD GLUCOSE      POCT Blood Glucose.: 197 mg/dL (24 Sep 2022 11:19)  POCT Blood Glucose.: 158 mg/dL (24 Sep 2022 07:38)  POCT Blood Glucose.: 238 mg/dL (23 Sep 2022 22:12)  POCT Blood Glucose.: 123 mg/dL (23 Sep 2022 17:51)  POCT Blood Glucose.: 150 mg/dL (23 Sep 2022 11:49)    PHYSICAL EXAM:  GENERAL: NAD,    HEAD:  Atraumatic, Normocephalic  EYES: EOMI, PERRLA, conjunctiva and sclera clear  ENMT: No tonsillar erythema, exudates, or enlargement; Moist mucous membranes, Good dentition, No lesions  NECK: Supple, No JVD, Normal thyroid  NERVOUS SYSTEM:  Alert & Oriented X3, Good concentration; Motor Strength 5/5 B/L upper and lower extremities; DTRs 2+ intact and symmetric  CHEST/LUNG: Clear to percussion bilaterally; No rales, rhonchi, wheezing, or rubs  HEART: Regular rate and rhythm; No murmurs, rubs, or gallops  ABDOMEN: Soft, Nontender, Nondistended; Bowel sounds present  EXTREMITIES:  2+ Peripheral Pulses, No clubbing, cyanosis, or edema  LYMPH: No lymphadenopathy noted  SKIN: No rashes or lesions    Labs                          9.8    9.82  )-----------( 410      ( 24 Sep 2022 06:36 )             31.9     09-24    140  |  102  |  14  ----------------------------<  139<H>  3.5   |  31  |  0.85    Ca    7.6<L>      24 Sep 2022 06:36  Phos  4.3     09-24  Mg     1.9     09-24    TPro  7.4  /  Alb  2.7<L>  /  TBili  0.8  /  DBili  x   /  AST  17  /  ALT  22  /  AlkPhos  89  09-24                        DVT prophylaxis: > Lovenox 40mg SQ daily  > Heparin   > SCD's

## 2022-09-24 NOTE — PROGRESS NOTE ADULT - SUBJECTIVE AND OBJECTIVE BOX
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;intercostal neuralgia&quot;)      Cardiac abnormality  (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;cardiac event&quot;; is unclear on diagnosis; denies hx/o MI)      Diabetes      Essential hypertension      IBS (irritable bowel syndrome)      Complex regional pain syndrome type 1      History of cholecystectomy      History of hand surgery  (pt states she had 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12)          FAMILY HISTORY:  FH: HTN (hypertension)        Home Medications:  benzonatate 100 mg oral capsule: 1 cap(s) orally 3 times a day (22 Jul 2022 16:48)  furosemide 40 mg oral tablet: 1 tab(s) orally once a day (22 Jul 2022 16:48)  guaiFENesin 1200 mg oral tablet, extended release: 1 tab(s) orally every 12 hours (22 Jul 2022 16:48)  lisinopril 10 mg oral tablet: 1 tab(s) orally once a day (22 Jul 2022 16:48)  mupirocin 2% topical ointment: 1 application topically 2 times a day (22 Jul 2022 16:48)  naloxegol 25 mg oral tablet: 1 tab(s) orally once a day (22 Jul 2022 16:48)  NIFEdipine 60 mg oral tablet, extended release: 1 tab(s) orally once a day (22 Jul 2022 16:48)  NovoLOG 100 units/mL injectable solution: 10 unit(s) subcutaneous 3 times a day (with meals) (11 Jul 2022 01:34)  oxyCODONE 15 mg oral tablet: 1 tab(s) orally every 4 hours, As needed, Severe Pain (7 - 10) (22 Jul 2022 16:48)  pantoprazole 40 mg oral delayed release tablet: 1 tab(s) orally once a day (before a meal) (22 Jul 2022 16:48)  pregabalin 100 mg oral capsule: 1 cap(s) orally every 8 hours (22 Jul 2022 16:48)  sertraline 50 mg oral tablet: 1 tab(s) orally once a day (22 Jul 2022 16:48)  Tresiba 100 units/mL subcutaneous solution: 80 unit(s) subcutaneous every 72 hours (11 Jul 2022 01:22)      MEDICATIONS  (STANDING):  dextrose 5%. 1000 milliLiter(s) (50 mL/Hr) IV Continuous <Continuous>  dextrose 5%. 1000 milliLiter(s) (100 mL/Hr) IV Continuous <Continuous>  dextrose 50% Injectable 25 Gram(s) IV Push once  dextrose 50% Injectable 12.5 Gram(s) IV Push once  dextrose 50% Injectable 25 Gram(s) IV Push once  furosemide   Injectable 40 milliGRAM(s) IV Push two times a day  glucagon  Injectable 1 milliGRAM(s) IntraMuscular once  heparin   Injectable 7500 Unit(s) SubCutaneous every 12 hours  influenza   Vaccine 0.5 milliLiter(s) IntraMuscular once  insulin lispro (ADMELOG) corrective regimen sliding scale   SubCutaneous three times a day before meals  lisinopril 10 milliGRAM(s) Oral daily  NIFEdipine XL 60 milliGRAM(s) Oral daily  pantoprazole    Tablet 40 milliGRAM(s) Oral before breakfast  pregabalin 100 milliGRAM(s) Oral every 8 hours  sertraline 50 milliGRAM(s) Oral daily  tiotropium 18 MICROgram(s) Capsule 1 Capsule(s) Inhalation daily    MEDICATIONS  (PRN):  baclofen 5 milliGRAM(s) Oral every 8 hours PRN Muscle Spasm  dextrose Oral Gel 15 Gram(s) Oral once PRN Blood Glucose LESS THAN 70 milliGRAM(s)/deciliter  melatonin 3 milliGRAM(s) Oral at bedtime PRN Sleep  oxyCODONE    IR 30 milliGRAM(s) Oral every 4 hours PRN Severe Pain (7 - 10)  oxyCODONE    IR 15 milliGRAM(s) Oral every 4 hours PRN Moderate Pain (4 - 6)  simethicone 80 milliGRAM(s) Chew three times a day PRN Gas      Diet, Consistent Carbohydrate w/Evening Snack:   Low Sodium  Liquid Protein Supplement     Qty per Day:  1 pkg aiky (09-23-22 @ 15:34) [Active]          Vital Signs Last 24 Hrs  T(C): 36.8 (24 Sep 2022 11:12), Max: 36.8 (23 Sep 2022 17:05)  T(F): 98.3 (24 Sep 2022 11:12), Max: 98.3 (23 Sep 2022 17:05)  HR: 72 (24 Sep 2022 11:12) (61 - 89)  BP: 119/77 (24 Sep 2022 11:12) (106/59 - 150/81)  BP(mean): --  RR: 18 (24 Sep 2022 11:12) (18 - 18)  SpO2: 97% (24 Sep 2022 11:12) (94% - 100%)    Parameters below as of 24 Sep 2022 11:12  Patient On (Oxygen Delivery Method): room air          09-23-22 @ 07:01  -  09-24-22 @ 07:00  --------------------------------------------------------  IN: 540 mL / OUT: 0 mL / NET: 540 mL    09-24-22 @ 07:01  -  09-24-22 @ 13:26  --------------------------------------------------------  IN: 120 mL / OUT: 2000 mL / NET: -1880 mL              LABS:                        9.8    9.82  )-----------( 410      ( 24 Sep 2022 06:36 )             31.9     09-24    140  |  102  |  14  ----------------------------<  139<H>  3.5   |  31  |  0.85    Ca    7.6<L>      24 Sep 2022 06:36  Phos  4.3     09-24  Mg     1.9     09-24    TPro  7.4  /  Alb  2.7<L>  /  TBili  0.8  /  DBili  x   /  AST  17  /  ALT  22  /  AlkPhos  89  09-24          ABG - ( 24 Sep 2022 05:44 )  pH, Arterial: 7.43  pH, Blood: x     /  pCO2: 49    /  pO2: 147   / HCO3: 32    / Base Excess: 6.9   /  SaO2: 98.5                WBC:  WBC Count: 9.82 K/uL (09-24 @ 06:36)  WBC Count: 10.68 K/uL (09-23 @ 07:45)  WBC Count: 14.11 K/uL (09-22 @ 00:50)      MICROBIOLOGY:  RECENT CULTURES:                  Sodium:  Sodium, Serum: 140 mmol/L (09-24 @ 06:36)  Sodium, Serum: 142 mmol/L (09-23 @ 07:45)  Sodium, Serum: 145 mmol/L (09-22 @ 00:50)      0.85 mg/dL 09-24 @ 06:36  0.79 mg/dL 09-23 @ 07:45  0.85 mg/dL 09-22 @ 00:50      Hemoglobin:  Hemoglobin: 9.8 g/dL (09-24 @ 06:36)  Hemoglobin: 10.0 g/dL (09-23 @ 07:45)  Hemoglobin: 10.0 g/dL (09-22 @ 00:50)      Platelets: Platelet Count - Automated: 410 K/uL (09-24 @ 06:36)  Platelet Count - Automated: 443 K/uL (09-23 @ 07:45)  Platelet Count - Automated: 466 K/uL (09-22 @ 00:50)      LIVER FUNCTIONS - ( 24 Sep 2022 06:36 )  Alb: 2.7 g/dL / Pro: 7.4 gm/dL / ALK PHOS: 89 U/L / ALT: 22 U/L / AST: 17 U/L / GGT: x                 RADIOLOGY & ADDITIONAL STUDIES:      MICROBIOLOGY:  RECENT CULTURES:

## 2022-09-24 NOTE — PROGRESS NOTE ADULT - ASSESSMENT
REVIEW OF SYMPTOMS      Able to give ROS  Yes     RELIABLE +/-   CONSTITUTIONAL Weakness Yes  Chills No   ENDOCRINE  No heat or cold intolerance    ALLERGY No hives  Sore throat No stridor  RESP Coughing blood no  Shortness of breath YES   NEURO No Headache  Confusion Pain neck No   CARDIAC No Chest pain No Palpitations   GI  Pain abdomen NO   Vomiting NO     NOTABLE FINDINGS    PHYSICAL EXAM    HEENT Unremarkable  atraumatic   RESP Fair air entry EXP prolonged    Harsh breath sound Resp distres mild   CARDIAC S1 S2 No S3     NO JVD    ABDOMEN SOFT BS PRESENT NOT DISTENDED No hepatosplenomegaly PEDAL EDEMA present No calf tenderness  NO rash       AGE/SEX.   52 f  DOA.  9/22/2022   CC .  9/22/2022 from [facility] with sob fluid retentn incr o2 requiremnt     RECENT HOSPITAL STAY.  7/10-7/23 [facility] Fever cough sob  Rxed mf pneum needed intubatn ards then bpap   extubatd 7/17 needed proni     HOSPITAL COURSE.   Fever 9/23/2022  COPD  Obesity   RO DVR   RO OHS   RO HFPEF    PMH .  pmh Former smoker smoker 45 y quit 2013   pmh marijuana smoker   pmh DM2  pmh Hytn   pmh IBS   pmh CRPS (Complex regional pain syndrome)  pmh spinal nerve stimulatoir 7/8/2022   pms pseudotumor cerebri        GENERAL DATA .   GOC.   9/22/2022 full code     ALLGY.       amitriptiline                       WT.   9/22/2022 156                                  BMI.    9/22/2022 52                           ICU STAY.  none  COVID.  9/24/2022 scv2 (-)     BEST PRACTICE ISSUES.    HOB ELEVATN. Yes  DVT PPLX. 9/22/2022 hpsc 7500.2      EWING PPLX.   9/22/2022 protonix 40    INFN PPLX.    SP SW DAWIT.   7/19 FEES      DIET.  9/22/2022 cons carb               VS/ PO/IO/ VENT/ DRIPS.   9/24/2022 afeb 72  110/70   9/24/2022 ra 97%     ASSESSMENT/RECOMMENDATIONS .   RESP.  SHORTNESS OF BREATH.  Shortness of breath is likely multifactorial.  She had recent ards sec aspn pneum  She has morbid obesity  bmi 52  She may have OHS   She will need further eval as outpt including pfts and sleep study    COPD.  9/23/2022 spiriva   cont rx    DVT pplx   9/22/2022 Is on hpsc    RESP FAILURE.  9/24/2022 5a 12/5/.6 743/49/147  9/22/2022 CO2 is 27    Target po 90-95% pH 730 (+)  will need abg close to discharge to determine need for home nppv     RO PE.  D-dimr 9/22/2022 dd 461  ct ch 9/23 no pe  v duplx 9/23 (-)   no vte     INFECTION.  W 9/22-9/23-9/24/2022 w 14- 10.0 - 9.9   ct ch 9/23 no pe ggo and patchy airspace opac atelectasis bl  and tr pl effs  flu ab 9/22/2022 (-)   rsv 9/22/2022 (-)   rvp 9/24 (-)   CT ch findings likely resolving ards which she  had 7/10-7/23 admission [facility]     CARDIAC.  CAD.  tr 9/22/2022 tr 15  mi ruld out    HYTN.  9/22 nifedipine 60    bp controlld     HFPEF.  echo 7/13/2022 hyperdynamic lvsf n rvsf n  bnp 9/22/2022 bnp 87   9/22/2022 lasix 40.2   9/22/2022 lisinopril 10   not decompensated     GI.  no active issues    HEMAT.  Hb 9/22-9/24/2022 Hb 10 - 9.9  monitor     RENAL.  Na 9/22/2022 Na 145   Cr 9/22/2022 Cr .8   moniytor      TIME SPENT   Over 25 minutes aggregate care time spent on encounter; activities included   direct patient care, counseling and/or coordinating care reviewing notes, lab data/ imaging , discussion with multidisciplinary team/ patient  /family and explaining in detail risks, benefits, alternatives  of the recommendations     MARIBEL KELLY 52 f 1970 9/22/2022 DR JUVENCIO COKER

## 2022-09-25 LAB
BASE EXCESS BLDA CALC-SCNC: 7.2 MMOL/L — HIGH (ref -2–3)
BLOOD GAS COMMENTS ARTERIAL: SIGNIFICANT CHANGE UP
CO2 BLDA-SCNC: 34 MMOL/L — HIGH (ref 19–24)
GAS PNL BLDA: SIGNIFICANT CHANGE UP
HCO3 BLDA-SCNC: 33 MMOL/L — HIGH (ref 21–28)
HOROWITZ INDEX BLDA+IHG-RTO: 0.36 — SIGNIFICANT CHANGE UP
PCO2 BLDA: 48 MMHG — HIGH (ref 32–46)
PH BLDA: 7.44 — SIGNIFICANT CHANGE UP (ref 7.35–7.45)
PO2 BLDA: 100 MMHG — SIGNIFICANT CHANGE UP (ref 83–108)
SAO2 % BLDA: 98.2 % — HIGH (ref 94–98)

## 2022-09-25 PROCEDURE — 99223 1ST HOSP IP/OBS HIGH 75: CPT

## 2022-09-25 PROCEDURE — 99232 SBSQ HOSP IP/OBS MODERATE 35: CPT

## 2022-09-25 PROCEDURE — 99233 SBSQ HOSP IP/OBS HIGH 50: CPT

## 2022-09-25 RX ORDER — SPIRONOLACTONE 25 MG/1
25 TABLET, FILM COATED ORAL DAILY
Refills: 0 | Status: DISCONTINUED | OUTPATIENT
Start: 2022-09-25 | End: 2022-10-04

## 2022-09-25 RX ORDER — BUMETANIDE 0.25 MG/ML
2 INJECTION INTRAMUSCULAR; INTRAVENOUS
Refills: 0 | Status: DISCONTINUED | OUTPATIENT
Start: 2022-09-26 | End: 2022-10-03

## 2022-09-25 RX ORDER — BUMETANIDE 0.25 MG/ML
2 INJECTION INTRAMUSCULAR; INTRAVENOUS
Refills: 0 | Status: DISCONTINUED | OUTPATIENT
Start: 2022-09-25 | End: 2022-09-25

## 2022-09-25 RX ORDER — INSULIN LISPRO 100/ML
VIAL (ML) SUBCUTANEOUS AT BEDTIME
Refills: 0 | Status: DISCONTINUED | OUTPATIENT
Start: 2022-09-25 | End: 2022-10-04

## 2022-09-25 RX ADMIN — Medication 100 MILLIGRAM(S): at 12:47

## 2022-09-25 RX ADMIN — TIOTROPIUM BROMIDE 1 CAPSULE(S): 18 CAPSULE ORAL; RESPIRATORY (INHALATION) at 12:47

## 2022-09-25 RX ADMIN — Medication 2: at 12:47

## 2022-09-25 RX ADMIN — LISINOPRIL 10 MILLIGRAM(S): 2.5 TABLET ORAL at 05:29

## 2022-09-25 RX ADMIN — Medication 3: at 17:13

## 2022-09-25 RX ADMIN — Medication 4: at 22:25

## 2022-09-25 RX ADMIN — Medication 40 MILLIGRAM(S): at 12:46

## 2022-09-25 RX ADMIN — HEPARIN SODIUM 7500 UNIT(S): 5000 INJECTION INTRAVENOUS; SUBCUTANEOUS at 05:29

## 2022-09-25 RX ADMIN — PANTOPRAZOLE SODIUM 40 MILLIGRAM(S): 20 TABLET, DELAYED RELEASE ORAL at 05:29

## 2022-09-25 RX ADMIN — OXYCODONE HYDROCHLORIDE 30 MILLIGRAM(S): 5 TABLET ORAL at 08:23

## 2022-09-25 RX ADMIN — SERTRALINE 50 MILLIGRAM(S): 25 TABLET, FILM COATED ORAL at 12:47

## 2022-09-25 RX ADMIN — Medication 100 MILLIGRAM(S): at 05:30

## 2022-09-25 RX ADMIN — Medication 3 MILLIGRAM(S): at 00:22

## 2022-09-25 RX ADMIN — HEPARIN SODIUM 7500 UNIT(S): 5000 INJECTION INTRAVENOUS; SUBCUTANEOUS at 17:14

## 2022-09-25 RX ADMIN — OXYCODONE HYDROCHLORIDE 30 MILLIGRAM(S): 5 TABLET ORAL at 20:47

## 2022-09-25 RX ADMIN — Medication 100 MILLIGRAM(S): at 21:56

## 2022-09-25 RX ADMIN — Medication 40 MILLIGRAM(S): at 05:29

## 2022-09-25 RX ADMIN — OXYCODONE HYDROCHLORIDE 30 MILLIGRAM(S): 5 TABLET ORAL at 21:20

## 2022-09-25 RX ADMIN — Medication 2: at 08:19

## 2022-09-25 RX ADMIN — OXYCODONE HYDROCHLORIDE 30 MILLIGRAM(S): 5 TABLET ORAL at 09:01

## 2022-09-25 NOTE — CONSULT NOTE ADULT - ASSESSMENT
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 responding to diuretics makes LVOT obstruction less likely as etiology of her presentation    -change diuretics to IV bumex 2mg BID  -add spironolactone 25mg daily  -repeat TTE  -cont lisinopril  -eventual bblocker and optimization of her GDMT  
  Initial evaluation/Pulmonary Critical Care consultation requested on 9/22/2022  by Dr Alarcon   from Dr Salas   Patient examined chart reviewed    HOSPITAL ADMISSION   PATIENT CAME  FROM (if information available)        REVIEW OF SYMPTOMS      Able to give ROS  Yes     RELIABLE +/-   CONSTITUTIONAL Weakness Yes  Chills No   ENDOCRINE  No heat or cold intolerance    ALLERGY No hives  Sore throat No stridor  RESP Coughing blood no  Shortness of breath YES   NEURO No Headache  Confusion Pain neck No   CARDIAC No Chest pain No Palpitations   GI  Pain abdomen NO   Vomiting NO     NOTABLE FINDINGS    PHYSICAL EXAM    HEENT Unremarkable  atraumatic   RESP Fair air entry EXP prolonged    Harsh breath sound Resp distres mild   CARDIAC S1 S2 No S3     NO JVD    ABDOMEN SOFT BS PRESENT NOT DISTENDED No hepatosplenomegaly PEDAL EDEMA present No calf tenderness  NO rash       PATIENT PRESENTATION.  53 yo woman hx of DM, HTN, MI 6 years ago without procedures, intubated in July for viral infection after spinal cord stimulation procedure, presents from nursing home with one week worsening dyspnea with new oxygen requirements after having been weaned off to PRN oxygen for several weeks, needing 10L to sleep several nights ago, new progressive orthopnea, and 50 pound weight gain in two weeks in the context of reduced urine volume.  Patient take 40mg daily lasix, recently increased to twice daily. Patient with edema up to bilateral thighs and of bilateral hands and arms and in her face which was not present before. Patient 95% on 4L , 90% off oxygen per EMS.  Pulm consulted 9/23/2022                                                                AGE/SEX.   52 f  DOA.  9/22/2022   CC .  9/22/2022 from [facility] with sob fluid retentn incr o2 requiremnt     RECENT HOSPITAL STAY.  7/10-7/23 [facility] Fever cough sob  Rxed mf pneum needed intubatn ards then bpap   extubatd 7/17 needed proni     HOSPITAL COURSE.   Fever 9/23/2022  COPD  Obesity   RO DVR   RO OHS   RO HFPEF    PMH .  pmh Former smoker smoker 45 y quit 2013   pmh marijuana smoker   pmh DM2  pmh Hytn   pmh IBS   pmh CRPS (Complex regional pain syndrome)  pmh spinal nerve stimulatoir 7/8/2022   pms pseudotumor cerebri            GENERAL DATA .   GOC.   9/22/2022 full code     ALLGY.       amitriptiline                       WT.   9/22/2022 156                                  BMI.    9/22/2022 52                           ICU STAY.  none  COVID.      BEST PRACTICE ISSUES.    HOB ELEVATN. Yes  DVT PPLX. 9/22/2022 hpsc 7500.2      EWING PPLX.   9/22/2022 protonix 40    INFN PPLX.    SP SW DAWIT.   7/19 FEES      DIET.  9/22/2022 cons carb               VS/ PO/IO/ VENT/ DRIPS.   9/23/2022 afeb 69 120/80   9/23/2022 4l 98%   9/22/2022 afeb 67 119/60   9/22/2022 4l 98%        ASSESSMENT/RECOMMENDATIONS .   RESP.  Shortness of breath.  She had recent ards sec aspn pneum  COPD.  9/23/2022 spiriva   DVT pplx   9/22/2022 Is on [facility]  RO RESP FAILURE.  9/23/2022 Check ABG   9/22/2022 Her CO2 is 27    9/22/2022 She may be co2 retainer OHS   RO PE.  D-dimr 9/22/2022 dd 461  9/23/2022 receommend cta ch and v duplx   INFECTION.  W 9/22-9/23/2022 w 14- 10.0   flu ab 9/22/2022 (-)   rsv 9/22/2022 (-)   9/23/2022 check blod c urine c   9/23/2022 check rvp   CARDIAC.  tr 9/22/2022 tr 15   echo 7/13/2022 hyperdynamic lvsf n rvsf n  bnp 9/22/2022 bnp 87   9/22/2022 lasix 40.2   9/22/2022 lisinopril 10   GI.  HEMAT.  Hb 9/22/2022 Hb 10   RENAL.  Na 9/22/2022 Na 145   Cr 9/22/2022 Cr .8     ENDOCRINE.   Hypoglycemia.  g 9/22/2022 g 64     TIME SPENT   Over 55 minutes aggregate care time spent on encounter; activities included   direct patient care, counseling and/or coordinating care reviewing notes, lab data/ imaging , discussion with multidisciplinary team/ patient  /family and explaining in detail risks, benefits, alternatives  of the recommendations     MARIBEL KELLY 52 f 1970 9/22/2022 DR JUVENCIO ALARCON

## 2022-09-25 NOTE — PROGRESS NOTE ADULT - SUBJECTIVE AND OBJECTIVE BOX
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;intercostal neuralgia&quot;)      Cardiac abnormality  (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;cardiac event&quot;; is unclear on diagnosis; denies hx/o MI)      Diabetes      Essential hypertension      IBS (irritable bowel syndrome)      Complex regional pain syndrome type 1      History of cholecystectomy      History of hand surgery  (pt states she had 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12)          FAMILY HISTORY:  FH: HTN (hypertension)        Home Medications:  benzonatate 100 mg oral capsule: 1 cap(s) orally 3 times a day (22 Jul 2022 16:48)  furosemide 40 mg oral tablet: 1 tab(s) orally once a day (22 Jul 2022 16:48)  guaiFENesin 1200 mg oral tablet, extended release: 1 tab(s) orally every 12 hours (22 Jul 2022 16:48)  lisinopril 10 mg oral tablet: 1 tab(s) orally once a day (22 Jul 2022 16:48)  mupirocin 2% topical ointment: 1 application topically 2 times a day (22 Jul 2022 16:48)  naloxegol 25 mg oral tablet: 1 tab(s) orally once a day (22 Jul 2022 16:48)  NIFEdipine 60 mg oral tablet, extended release: 1 tab(s) orally once a day (22 Jul 2022 16:48)  NovoLOG 100 units/mL injectable solution: 10 unit(s) subcutaneous 3 times a day (with meals) (11 Jul 2022 01:34)  oxyCODONE 15 mg oral tablet: 1 tab(s) orally every 4 hours, As needed, Severe Pain (7 - 10) (22 Jul 2022 16:48)  pantoprazole 40 mg oral delayed release tablet: 1 tab(s) orally once a day (before a meal) (22 Jul 2022 16:48)  pregabalin 100 mg oral capsule: 1 cap(s) orally every 8 hours (22 Jul 2022 16:48)  sertraline 50 mg oral tablet: 1 tab(s) orally once a day (22 Jul 2022 16:48)  Tresiba 100 units/mL subcutaneous solution: 80 unit(s) subcutaneous every 72 hours (11 Jul 2022 01:22)      MEDICATIONS  (STANDING):  dextrose 5%. 1000 milliLiter(s) (100 mL/Hr) IV Continuous <Continuous>  dextrose 5%. 1000 milliLiter(s) (50 mL/Hr) IV Continuous <Continuous>  dextrose 50% Injectable 25 Gram(s) IV Push once  dextrose 50% Injectable 12.5 Gram(s) IV Push once  dextrose 50% Injectable 25 Gram(s) IV Push once  furosemide   Injectable 40 milliGRAM(s) IV Push two times a day  glucagon  Injectable 1 milliGRAM(s) IntraMuscular once  heparin   Injectable 7500 Unit(s) SubCutaneous every 12 hours  influenza   Vaccine 0.5 milliLiter(s) IntraMuscular once  insulin lispro (ADMELOG) corrective regimen sliding scale   SubCutaneous three times a day before meals  lisinopril 10 milliGRAM(s) Oral daily  NIFEdipine XL 60 milliGRAM(s) Oral daily  pantoprazole    Tablet 40 milliGRAM(s) Oral before breakfast  pregabalin 100 milliGRAM(s) Oral every 8 hours  sertraline 50 milliGRAM(s) Oral daily  tiotropium 18 MICROgram(s) Capsule 1 Capsule(s) Inhalation daily    MEDICATIONS  (PRN):  baclofen 5 milliGRAM(s) Oral every 8 hours PRN Muscle Spasm  dextrose Oral Gel 15 Gram(s) Oral once PRN Blood Glucose LESS THAN 70 milliGRAM(s)/deciliter  melatonin 3 milliGRAM(s) Oral at bedtime PRN Sleep  oxyCODONE    IR 30 milliGRAM(s) Oral every 4 hours PRN Severe Pain (7 - 10)  oxyCODONE    IR 15 milliGRAM(s) Oral every 4 hours PRN Moderate Pain (4 - 6)  simethicone 80 milliGRAM(s) Chew three times a day PRN Gas      Diet, Consistent Carbohydrate w/Evening Snack:   Low Sodium  Liquid Protein Supplement     Qty per Day:  1 pkg aiky (09-23-22 @ 15:34) [Active]          Vital Signs Last 24 Hrs  T(C): 36.6 (25 Sep 2022 10:53), Max: 36.7 (24 Sep 2022 16:30)  T(F): 97.8 (25 Sep 2022 10:53), Max: 98.1 (25 Sep 2022 00:42)  HR: 62 (25 Sep 2022 10:53) (62 - 77)  BP: 113/63 (25 Sep 2022 10:53) (91/47 - 120/63)  BP(mean): --  RR: 19 (25 Sep 2022 10:53) (16 - 19)  SpO2: 96% (25 Sep 2022 10:53) (93% - 98%)    Parameters below as of 25 Sep 2022 10:53  Patient On (Oxygen Delivery Method): nasal cannula          09-24-22 @ 07:01  -  09-25-22 @ 07:00  --------------------------------------------------------  IN: 120 mL / OUT: 2000 mL / NET: -1880 mL              LABS:                        9.8    9.82  )-----------( 410      ( 24 Sep 2022 06:36 )             31.9     09-24    140  |  102  |  14  ----------------------------<  139<H>  3.5   |  31  |  0.85    Ca    7.6<L>      24 Sep 2022 06:36  Phos  4.3     09-24  Mg     1.9     09-24    TPro  7.4  /  Alb  2.7<L>  /  TBili  0.8  /  DBili  x   /  AST  17  /  ALT  22  /  AlkPhos  89  09-24          ABG - ( 25 Sep 2022 06:33 )  pH, Arterial: 7.44  pH, Blood: x     /  pCO2: 48    /  pO2: 100   / HCO3: 33    / Base Excess: 7.2   /  SaO2: 98.2                WBC:  WBC Count: 9.82 K/uL (09-24 @ 06:36)  WBC Count: 10.68 K/uL (09-23 @ 07:45)  WBC Count: 14.11 K/uL (09-22 @ 00:50)      MICROBIOLOGY:  RECENT CULTURES:  09-23 .Blood Blood XXXX XXXX   No growth to date.                    Sodium:  Sodium, Serum: 140 mmol/L (09-24 @ 06:36)  Sodium, Serum: 142 mmol/L (09-23 @ 07:45)  Sodium, Serum: 145 mmol/L (09-22 @ 00:50)      0.85 mg/dL 09-24 @ 06:36  0.79 mg/dL 09-23 @ 07:45  0.85 mg/dL 09-22 @ 00:50      Hemoglobin:  Hemoglobin: 9.8 g/dL (09-24 @ 06:36)  Hemoglobin: 10.0 g/dL (09-23 @ 07:45)  Hemoglobin: 10.0 g/dL (09-22 @ 00:50)      Platelets: Platelet Count - Automated: 410 K/uL (09-24 @ 06:36)  Platelet Count - Automated: 443 K/uL (09-23 @ 07:45)  Platelet Count - Automated: 466 K/uL (09-22 @ 00:50)      LIVER FUNCTIONS - ( 24 Sep 2022 06:36 )  Alb: 2.7 g/dL / Pro: 7.4 gm/dL / ALK PHOS: 89 U/L / ALT: 22 U/L / AST: 17 U/L / GGT: x                 RADIOLOGY & ADDITIONAL STUDIES:      MICROBIOLOGY:  RECENT CULTURES:  09-23 .Blood Blood XXXX XXXX   No growth to date.

## 2022-09-25 NOTE — CONSULT NOTE ADULT - SUBJECTIVE AND OBJECTIVE BOX
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;intercostal neuralgia&quot;)      Cardiac abnormality  (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;cardiac event&quot;; is unclear on diagnosis; denies hx/o MI)      Diabetes      Essential hypertension      IBS (irritable bowel syndrome)      Complex regional pain syndrome type 1      History of cholecystectomy      History of hand surgery  (pt states she had 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12)          FAMILY HISTORY:  FH: HTN (hypertension)        Home Medications:  benzonatate 100 mg oral capsule: 1 cap(s) orally 3 times a day (22 Jul 2022 16:48)  furosemide 40 mg oral tablet: 1 tab(s) orally once a day (22 Jul 2022 16:48)  guaiFENesin 1200 mg oral tablet, extended release: 1 tab(s) orally every 12 hours (22 Jul 2022 16:48)  lisinopril 10 mg oral tablet: 1 tab(s) orally once a day (22 Jul 2022 16:48)  mupirocin 2% topical ointment: 1 application topically 2 times a day (22 Jul 2022 16:48)  naloxegol 25 mg oral tablet: 1 tab(s) orally once a day (22 Jul 2022 16:48)  NIFEdipine 60 mg oral tablet, extended release: 1 tab(s) orally once a day (22 Jul 2022 16:48)  NovoLOG 100 units/mL injectable solution: 10 unit(s) subcutaneous 3 times a day (with meals) (11 Jul 2022 01:34)  oxyCODONE 15 mg oral tablet: 1 tab(s) orally every 4 hours, As needed, Severe Pain (7 - 10) (22 Jul 2022 16:48)  pantoprazole 40 mg oral delayed release tablet: 1 tab(s) orally once a day (before a meal) (22 Jul 2022 16:48)  pregabalin 100 mg oral capsule: 1 cap(s) orally every 8 hours (22 Jul 2022 16:48)  sertraline 50 mg oral tablet: 1 tab(s) orally once a day (22 Jul 2022 16:48)  Tresiba 100 units/mL subcutaneous solution: 80 unit(s) subcutaneous every 72 hours (11 Jul 2022 01:22)      MEDICATIONS  (STANDING):  dextrose 5%. 1000 milliLiter(s) (50 mL/Hr) IV Continuous <Continuous>  dextrose 5%. 1000 milliLiter(s) (100 mL/Hr) IV Continuous <Continuous>  dextrose 50% Injectable 25 Gram(s) IV Push once  dextrose 50% Injectable 12.5 Gram(s) IV Push once  dextrose 50% Injectable 25 Gram(s) IV Push once  furosemide   Injectable 40 milliGRAM(s) IV Push two times a day  glucagon  Injectable 1 milliGRAM(s) IntraMuscular once  heparin   Injectable 7500 Unit(s) SubCutaneous every 12 hours  influenza   Vaccine 0.5 milliLiter(s) IntraMuscular once  insulin lispro (ADMELOG) corrective regimen sliding scale   SubCutaneous three times a day before meals  lisinopril 10 milliGRAM(s) Oral daily  NIFEdipine XL 60 milliGRAM(s) Oral daily  pantoprazole    Tablet 40 milliGRAM(s) Oral before breakfast  pregabalin 100 milliGRAM(s) Oral every 8 hours  sertraline 50 milliGRAM(s) Oral daily    MEDICATIONS  (PRN):  dextrose Oral Gel 15 Gram(s) Oral once PRN Blood Glucose LESS THAN 70 milliGRAM(s)/deciliter  melatonin 3 milliGRAM(s) Oral at bedtime PRN Sleep  oxyCODONE    IR 15 milliGRAM(s) Oral every 6 hours PRN Severe Pain (7 - 10)  simethicone 80 milliGRAM(s) Chew three times a day PRN Gas      Diet, Consistent Carbohydrate w/Evening Snack (09-22-22 @ 04:08) [Active]          Vital Signs Last 24 Hrs  T(C): 36.8 (23 Sep 2022 04:45), Max: 37.3 (22 Sep 2022 23:58)  T(F): 98.3 (23 Sep 2022 04:45), Max: 99.1 (22 Sep 2022 23:58)  HR: 65 (23 Sep 2022 05:32) (63 - 77)  BP: 126/87 (23 Sep 2022 04:45) (108/70 - 144/77)  BP(mean): --  RR: 18 (23 Sep 2022 04:45) (18 - 18)  SpO2: 100% (23 Sep 2022 05:32) (96% - 100%)    Parameters below as of 23 Sep 2022 04:45  Patient On (Oxygen Delivery Method): nasal cannula          09-22-22 @ 07:01  -  09-23-22 @ 07:00  --------------------------------------------------------  IN: 240 mL / OUT: 4550 mL / NET: -4310 mL              LABS:                        10.0   10.68 )-----------( 443      ( 23 Sep 2022 07:45 )             32.4     09-23    142  |  106  |  13  ----------------------------<  95  3.7   |  29  |  0.79    Ca    7.5<L>      23 Sep 2022 07:45  Phos  4.9     09-23  Mg     1.3     09-23    TPro  7.4  /  Alb  2.7<L>  /  TBili  0.6  /  DBili  x   /  AST  18  /  ALT  26  /  AlkPhos  93  09-23              WBC:  WBC Count: 10.68 K/uL (09-23 @ 07:45)  WBC Count: 14.11 K/uL (09-22 @ 00:50)      MICROBIOLOGY:  RECENT CULTURES:                  Sodium:  Sodium, Serum: 142 mmol/L (09-23 @ 07:45)  Sodium, Serum: 145 mmol/L (09-22 @ 00:50)      0.79 mg/dL 09-23 @ 07:45  0.85 mg/dL 09-22 @ 00:50      Hemoglobin:  Hemoglobin: 10.0 g/dL (09-23 @ 07:45)  Hemoglobin: 10.0 g/dL (09-22 @ 00:50)      Platelets: Platelet Count - Automated: 443 K/uL (09-23 @ 07:45)  Platelet Count - Automated: 466 K/uL (09-22 @ 00:50)      LIVER FUNCTIONS - ( 23 Sep 2022 07:45 )  Alb: 2.7 g/dL / Pro: 7.4 gm/dL / ALK PHOS: 93 U/L / ALT: 26 U/L / AST: 18 U/L / GGT: x                 RADIOLOGY & ADDITIONAL STUDIES:      MICROBIOLOGY:  RECENT CULTURES:          
Cardiology Initial Consult    [facility] - Cardiology [facility] [place]  [address] [place] [address], [place] [address]  Office: [phone]  Fax: [phone]    CHIEF COMPLAINT:  SOB    HISTORY OF PRESENT ILLNESS:  52F HTN, DM, CAD, chronic pain s/p spinal nerve stimulator, IBS, pseudotumor cerebri with three weeks of worsening LE edema, dyspnea, and orthopnea with weight gain.    Has half sister with CHF dx age 50  Recent TTE was wnl except for mild pHTN  No PE on CTA      Allergies  amitriptyline (Other)  Intolerances	    MEDICATIONS:  furosemide   Injectable 40 milliGRAM(s) IV Push two times a day  heparin   Injectable 7500 Unit(s) SubCutaneous every 12 hours  lisinopril 10 milliGRAM(s) Oral daily  NIFEdipine XL 60 milliGRAM(s) Oral daily  tiotropium 18 MICROgram(s) Capsule 1 Capsule(s) Inhalation daily  baclofen 5 milliGRAM(s) Oral every 8 hours PRN  melatonin 3 milliGRAM(s) Oral at bedtime PRN  oxyCODONE    IR 30 milliGRAM(s) Oral every 4 hours PRN  oxyCODONE    IR 15 milliGRAM(s) Oral every 4 hours PRN  pregabalin 100 milliGRAM(s) Oral every 8 hours  sertraline 50 milliGRAM(s) Oral daily  pantoprazole    Tablet 40 milliGRAM(s) Oral before breakfast  simethicone 80 milliGRAM(s) Chew three times a day PRN  insulin lispro (ADMELOG) corrective regimen sliding scale   SubCutaneous three times a day before meals    PAST MEDICAL & SURGICAL HISTORY:  Diabetes  HTN (hypertension)  Neuropathy  Obesity  Former cigarette smoker  (smoked x 45 years; quit ~2013)  Marijuana smoker, continuous  (states smokes 3 - 4 times per day for pain management reasons)  Neuralgia  (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;intercostal neuralgia&quot;)  Cardiac abnormality  (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;cardiac event&quot;; is unclear on diagnosis; denies hx/o MI)  Diabetes  Essential hypertension  IBS (irritable bowel syndrome)  Complex regional pain syndrome type 1  History of cholecystectomy  History of hand surgery  (pt states she had 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12)      FAMILY HISTORY:  FH: HTN (hypertension)    SOCIAL HISTORY:    [ ] Non-smoker  [x] Smoker  [ ] Alcohol    Review of Systems:  Constitutional: [- ] Fever [- ] Chills [ ] Fatigue [ ] Weight change   HEENT: [ ] Blurred vision [ ] Eye pain [- ] Headache [ ] Runny nose [ ] Sore throat   Respiratory: [ ] Cough [ ] Wheezing [ x] Shortness of breath  Cardiovascular: [ -] Chest Pain [- ] Palpitations [x ] MCCALL [ ] PND [ x] Orthopnea  Gastrointestinal: [- ] Abdominal Pain [ ] Diarrhea [ ] Constipation [ ] Hemorrhoids [ ] Nausea [ ] Vomiting  Genitourinary: [ ] Nocturia [- ] Dysuria [ ] Incontinence  Extremities: [ x] Swelling [ ] Joint Pain  Neurologic: [ ] Focal deficit [ ] Paresthesias [- ] Syncope  Skin: [ -] Rash [ ] Ecchymoses [ ] Wounds [ ] Lesions  Psychiatry: [ -] Depression [ ] Suicidal/Homicidal ideation [ ] Anxiety [ ] Sleep disturbances  [ x] 10 point review of systems is otherwise negative except as mentioned above            [ ]Unable to obtain    PHYSICAL EXAM:  T(C): 36.6 (09-25-22 @ 10:53), Max: 36.7 (09-24-22 @ 16:30)  HR: 78 (09-25-22 @ 11:59) (62 - 78)  BP: 113/63 (09-25-22 @ 10:53) (91/47 - 120/63)  RR: 19 (09-25-22 @ 10:53) (16 - 19)  SpO2: 97% (09-25-22 @ 11:59) (93% - 98%)  Wt(kg): --  I&O's Summary    24 Sep 2022 07:01  -  25 Sep 2022 07:00  --------------------------------------------------------  IN: 120 mL / OUT: 2000 mL / NET: -1880 mL    25 Sep 2022 07:01  -  25 Sep 2022 12:32  --------------------------------------------------------  IN: 320 mL / OUT: 760 mL / NET: -440 mL    Appearance: No acute distress  HEENT:   mmm  Cardiovascular: Normal S1 S2, elevated JVP, no murmurs, 2+ LE edema up to thigh  Respiratory: trace bilateral crackles  Psychiatry: A & O x 3, Mood & affect appropriate  Gastrointestinal:  soft nt  Skin: No rashes, no ecchymoses, no cyanosis	  Neurologic: grossly non-focal  Extremities: Normal range of motion, no clubbing, cyanosis  Vascular: Peripheral pulses palpable bilaterally    LABS:	 	  CBC Full  -  ( 24 Sep 2022 06:36 )  WBC Count : 9.82 K/uL  Hemoglobin : 9.8 g/dL  Hematocrit : 31.9 %  Platelet Count - Automated : 410 K/uL    09-24  140  |  102  |  14  ----------------------------<  139<H>  3.5   |  31  |  0.85    Ca    7.6<L>      24 Sep 2022 06:36  Phos  4.3     09-24  Mg     1.9     09-24    TPro  7.4  /  Alb  2.7<L>  /  TBili  0.8  /  DBili  x   /  AST  17  /  ALT  22  /  AlkPhos  89  09-24    proBNP:   Lipid Profile:   HgA1c:   TSH:     CARDIAC MARKERS:    TELEMETRY: 	  SR, PVC's  ECG:  	SR  RADIOLOGY:  OTHER: 	    PREVIOUS DIAGNOSTIC TESTING:    [x] Echocardiogram: < from: TTE with Doppler (w/Cont) (07.13.22 @ 12:53) >  Conclusions:  1. Normal mitral valve. Minimal mitral regurgitation.  2. Normal trileaflet aortic valve. No aortic valve  regurgitation seen.  3. Hyperdynamic left ventricular systolic function.  Endocardial visualization enhanced with intravenous  injection of Ultrasonic Enhancing Agent (Definity). No left  ventricular thrombus.  4. Normal right ventricular size and function.  *** No previous Echo exam.    < end of copied text >    [ ] Catheterization:  [ ] Stress Test:

## 2022-09-25 NOTE — PROGRESS NOTE ADULT - ASSESSMENT
AGE/SEX.   52 f  DOA.  9/22/2022   CC .  9/22/2022 from [facility] with sob fluid retentn incr o2 requiremnt     RECENT HOSPITAL STAY.  7/10-7/23 [facility] Fever cough sob  Rxed mf pneum needed intubatn ards then bpap   extubatd 7/17 needed proni     HOSPITAL COURSE.   Fever 9/23/2022  COPD  Obesity   RO DVR   RO OHS   RO HFPEF    PMH .  pmh Former smoker smoker 45 y quit 2013   pmh marijuana smoker   pmh DM2  pmh Hytn   pmh IBS   pmh CRPS (Complex regional pain syndrome)  pmh spinal nerve stimulatoir 7/8/2022   pms pseudotumor cerebri            GENERAL DATA .   GOC.   9/22/2022 full code     ALLGY.       amitriptiline                       WT.   9/22/2022 156                                  BMI.    9/22/2022 52                           ICU STAY.  none  COVID.  9/24/2022 scv2 (-)     BEST PRACTICE ISSUES.    HOB ELEVATN. Yes  DVT PPLX. 9/22/2022 hpsc 7500.2      EWING PPLX.   9/22/2022 protonix 40    INFN PPLX.    SP SW DAWIT.   7/19 FEES      DIET.  9/22/2022 cons carb                    VS/ PO/IO/ VENT/ DRIPS.  9/25/2022 afeb 78 110/60   9/25/2022 4l 98%      ASSESSMENT/RECOMMENDATIONS .   RESP.  SHORTNESS OF BREATH.  Shortness of breath is likely multifactorial.  She had recent ards sec aspn pneum  She has morbid obesity  bmi 52  She may have OHS   She will need further eval as outpt including pfts and sleep study    COPD.  9/23/2022 spiriva   cont rx    DVT pplx   9/22/2022 Is on hpsc    RESP FAILURE.  9/24/2022 5a 12/5/.6 743/49/147  9/22/2022 CO2 is 27    Target po 90-95% pH 730 (+)  will need abg close to discharge to determine need for home nppv     RO PE.  D-dimr 9/22/2022 dd 461  ct ch 9/23 no pe  v duplx 9/23 (-)   no vte     INFECTION.  W 9/22-9/23-9/24/2022 w 14- 10.0 - 9.9   ct ch 9/23 no pe ggo and patchy airspace opac atelectasis bl  and tr pl effs  flu ab 9/22/2022 (-)   rsv 9/22/2022 (-)   rvp 9/24 (-)   CT ch findings likely resolving ards which she  had 7/10-7/23 admission [facility]     CARDIAC.  CAD.  tr 9/22/2022 tr 15  mi ruld out    HYTN.  9/22 nifedipine 60    bp controlld     HFPEF.  echo 7/13/2022 hyperdynamic lvsf n rvsf n  bnp 9/22/2022 bnp 87   9/22/2022 lisinopril 10   9/25/2022 bumetanide 2.2   not decompensated     GI.  no active issues    HEMAT.  Hb 9/22-9/24/2022 Hb 10 - 9.9  monitor     RENAL.  Na 9/22/2022 Na 145   Cr 9/22/2022 Cr .8   moniytor    TIME SPENT   Over 25 minutes aggregate care time spent on encounter; activities included   direct patient care, counseling and/or coordinating care reviewing notes, lab data/ imaging , discussion with multidisciplinary team/ patient  /family and explaining in detail risks, benefits, alternatives  of the recommendations     MARIBEL KELLY 52 f 1970 9/22/2022 DR JUVENCIO COKER

## 2022-09-25 NOTE — PROGRESS NOTE ADULT - SUBJECTIVE AND OBJECTIVE BOX
Patient is a 52y old  Female who presents with a chief complaint of dyspnea, orthopnea (25 Sep 2022 11:39)    INTERVAL HPI/OVERNIGHT EVENTS:    MEDICATIONS  (STANDING):  dextrose 5%. 1000 milliLiter(s) (50 mL/Hr) IV Continuous <Continuous>  dextrose 5%. 1000 milliLiter(s) (100 mL/Hr) IV Continuous <Continuous>  dextrose 50% Injectable 25 Gram(s) IV Push once  dextrose 50% Injectable 12.5 Gram(s) IV Push once  dextrose 50% Injectable 25 Gram(s) IV Push once  furosemide   Injectable 40 milliGRAM(s) IV Push two times a day  glucagon  Injectable 1 milliGRAM(s) IntraMuscular once  heparin   Injectable 7500 Unit(s) SubCutaneous every 12 hours  influenza   Vaccine 0.5 milliLiter(s) IntraMuscular once  insulin lispro (ADMELOG) corrective regimen sliding scale   SubCutaneous three times a day before meals  lisinopril 10 milliGRAM(s) Oral daily  NIFEdipine XL 60 milliGRAM(s) Oral daily  pantoprazole    Tablet 40 milliGRAM(s) Oral before breakfast  pregabalin 100 milliGRAM(s) Oral every 8 hours  sertraline 50 milliGRAM(s) Oral daily  tiotropium 18 MICROgram(s) Capsule 1 Capsule(s) Inhalation daily    MEDICATIONS  (PRN):  baclofen 5 milliGRAM(s) Oral every 8 hours PRN Muscle Spasm  dextrose Oral Gel 15 Gram(s) Oral once PRN Blood Glucose LESS THAN 70 milliGRAM(s)/deciliter  melatonin 3 milliGRAM(s) Oral at bedtime PRN Sleep  oxyCODONE    IR 30 milliGRAM(s) Oral every 4 hours PRN Severe Pain (7 - 10)  oxyCODONE    IR 15 milliGRAM(s) Oral every 4 hours PRN Moderate Pain (4 - 6)  simethicone 80 milliGRAM(s) Chew three times a day PRN Gas    Allergies    amitriptyline (Other)    Intolerances      REVIEW OF SYSTEMS:  All other systems reviewed and are negative    Vital Signs Last 24 Hrs  T(C): 36.6 (25 Sep 2022 10:53), Max: 36.7 (24 Sep 2022 16:30)  T(F): 97.8 (25 Sep 2022 10:53), Max: 98.1 (25 Sep 2022 00:42)  HR: 78 (25 Sep 2022 11:59) (62 - 78)  BP: 113/63 (25 Sep 2022 10:53) (91/47 - 120/63)  BP(mean): --  RR: 19 (25 Sep 2022 10:53) (16 - 19)  SpO2: 97% (25 Sep 2022 11:59) (93% - 98%)    Parameters below as of 25 Sep 2022 10:53  Patient On (Oxygen Delivery Method): nasal cannula      Daily     Daily   I&O's Summary    24 Sep 2022 07:01  -  25 Sep 2022 07:00  --------------------------------------------------------  IN: 120 mL / OUT: 2000 mL / NET: -1880 mL    25 Sep 2022 07:01  -  25 Sep 2022 12:55  --------------------------------------------------------  IN: 320 mL / OUT: 760 mL / NET: -440 mL      CAPILLARY BLOOD GLUCOSE      POCT Blood Glucose.: 241 mg/dL (25 Sep 2022 12:44)  POCT Blood Glucose.: 216 mg/dL (25 Sep 2022 07:59)  POCT Blood Glucose.: 266 mg/dL (24 Sep 2022 16:21)    PHYSICAL EXAM:  GENERAL: NAD,    HEAD:  Atraumatic, Normocephalic  EYES: EOMI, PERRLA, conjunctiva and sclera clear  ENMT: No tonsillar erythema, exudates, or enlargement; Moist mucous membranes, Good dentition, No lesions  NECK: Supple, No JVD, Normal thyroid  NERVOUS SYSTEM:  Alert & Oriented X3, Good concentration; Motor Strength 5/5 B/L upper and lower extremities; DTRs 2+ intact and symmetric  CHEST/LUNG: Clear to percussion bilaterally; No rales, rhonchi, wheezing, or rubs  HEART: Regular rate and rhythm; No murmurs, rubs, or gallops  ABDOMEN: Soft, Nontender, Nondistended; Bowel sounds present  EXTREMITIES:  2+ Peripheral Pulses, No clubbing, cyanosis, 2++ edema  LYMPH: No lymphadenopathy noted  SKIN: No rashes or lesions    Labs                          9.8    9.82  )-----------( 410      ( 24 Sep 2022 06:36 )             31.9     09-24    140  |  102  |  14  ----------------------------<  139<H>  3.5   |  31  |  0.85    Ca    7.6<L>      24 Sep 2022 06:36  Phos  4.3     09-24  Mg     1.9     09-24    TPro  7.4  /  Alb  2.7<L>  /  TBili  0.8  /  DBili  x   /  AST  17  /  ALT  22  /  AlkPhos  89  09-24              Culture - Blood (collected 23 Sep 2022 12:45)  Source: .Blood Blood  Preliminary Report (24 Sep 2022 19:42):    No growth to date.                DVT prophylaxis: > Lovenox 40mg SQ daily  > Heparin   > SCD's

## 2022-09-26 LAB
ANION GAP SERPL CALC-SCNC: 4 MMOL/L — LOW (ref 5–17)
BUN SERPL-MCNC: 13 MG/DL — SIGNIFICANT CHANGE UP (ref 7–23)
CALCIUM SERPL-MCNC: 8.7 MG/DL — SIGNIFICANT CHANGE UP (ref 8.5–10.1)
CHLORIDE SERPL-SCNC: 103 MMOL/L — SIGNIFICANT CHANGE UP (ref 96–108)
CO2 SERPL-SCNC: 32 MMOL/L — HIGH (ref 22–31)
CREAT SERPL-MCNC: 0.83 MG/DL — SIGNIFICANT CHANGE UP (ref 0.5–1.3)
EGFR: 85 ML/MIN/1.73M2 — SIGNIFICANT CHANGE UP
GLUCOSE SERPL-MCNC: 227 MG/DL — HIGH (ref 70–99)
HCT VFR BLD CALC: 32.2 % — LOW (ref 34.5–45)
HGB BLD-MCNC: 9.9 G/DL — LOW (ref 11.5–15.5)
MCHC RBC-ENTMCNC: 27.7 PG — SIGNIFICANT CHANGE UP (ref 27–34)
MCHC RBC-ENTMCNC: 30.7 G/DL — LOW (ref 32–36)
MCV RBC AUTO: 90.2 FL — SIGNIFICANT CHANGE UP (ref 80–100)
NRBC # BLD: 0 /100 WBCS — SIGNIFICANT CHANGE UP (ref 0–0)
PLATELET # BLD AUTO: 419 K/UL — HIGH (ref 150–400)
POTASSIUM SERPL-MCNC: 3.6 MMOL/L — SIGNIFICANT CHANGE UP (ref 3.5–5.3)
POTASSIUM SERPL-SCNC: 3.6 MMOL/L — SIGNIFICANT CHANGE UP (ref 3.5–5.3)
RBC # BLD: 3.57 M/UL — LOW (ref 3.8–5.2)
RBC # FLD: 14.6 % — HIGH (ref 10.3–14.5)
SODIUM SERPL-SCNC: 139 MMOL/L — SIGNIFICANT CHANGE UP (ref 135–145)
WBC # BLD: 8.54 K/UL — SIGNIFICANT CHANGE UP (ref 3.8–10.5)
WBC # FLD AUTO: 8.54 K/UL — SIGNIFICANT CHANGE UP (ref 3.8–10.5)

## 2022-09-26 PROCEDURE — 99233 SBSQ HOSP IP/OBS HIGH 50: CPT

## 2022-09-26 PROCEDURE — 99233 SBSQ HOSP IP/OBS HIGH 50: CPT | Mod: FS

## 2022-09-26 PROCEDURE — 99232 SBSQ HOSP IP/OBS MODERATE 35: CPT

## 2022-09-26 RX ADMIN — OXYCODONE HYDROCHLORIDE 30 MILLIGRAM(S): 5 TABLET ORAL at 14:46

## 2022-09-26 RX ADMIN — Medication 100 MILLIGRAM(S): at 15:27

## 2022-09-26 RX ADMIN — HEPARIN SODIUM 7500 UNIT(S): 5000 INJECTION INTRAVENOUS; SUBCUTANEOUS at 06:08

## 2022-09-26 RX ADMIN — Medication 3: at 09:14

## 2022-09-26 RX ADMIN — Medication 100 MILLIGRAM(S): at 06:07

## 2022-09-26 RX ADMIN — TIOTROPIUM BROMIDE 1 CAPSULE(S): 18 CAPSULE ORAL; RESPIRATORY (INHALATION) at 12:40

## 2022-09-26 RX ADMIN — SERTRALINE 50 MILLIGRAM(S): 25 TABLET, FILM COATED ORAL at 12:39

## 2022-09-26 RX ADMIN — OXYCODONE HYDROCHLORIDE 15 MILLIGRAM(S): 5 TABLET ORAL at 13:13

## 2022-09-26 RX ADMIN — OXYCODONE HYDROCHLORIDE 30 MILLIGRAM(S): 5 TABLET ORAL at 22:24

## 2022-09-26 RX ADMIN — LISINOPRIL 10 MILLIGRAM(S): 2.5 TABLET ORAL at 06:11

## 2022-09-26 RX ADMIN — BUMETANIDE 2 MILLIGRAM(S): 0.25 INJECTION INTRAMUSCULAR; INTRAVENOUS at 06:23

## 2022-09-26 RX ADMIN — Medication 3 MILLIGRAM(S): at 22:19

## 2022-09-26 RX ADMIN — OXYCODONE HYDROCHLORIDE 15 MILLIGRAM(S): 5 TABLET ORAL at 11:44

## 2022-09-26 RX ADMIN — Medication 4: at 22:25

## 2022-09-26 RX ADMIN — BUMETANIDE 2 MILLIGRAM(S): 0.25 INJECTION INTRAMUSCULAR; INTRAVENOUS at 17:04

## 2022-09-26 RX ADMIN — SPIRONOLACTONE 25 MILLIGRAM(S): 25 TABLET, FILM COATED ORAL at 06:07

## 2022-09-26 RX ADMIN — Medication 3: at 12:39

## 2022-09-26 RX ADMIN — OXYCODONE HYDROCHLORIDE 30 MILLIGRAM(S): 5 TABLET ORAL at 15:28

## 2022-09-26 RX ADMIN — Medication 100 MILLIGRAM(S): at 22:19

## 2022-09-26 RX ADMIN — HEPARIN SODIUM 7500 UNIT(S): 5000 INJECTION INTRAVENOUS; SUBCUTANEOUS at 18:38

## 2022-09-26 RX ADMIN — OXYCODONE HYDROCHLORIDE 30 MILLIGRAM(S): 5 TABLET ORAL at 22:50

## 2022-09-26 RX ADMIN — Medication 3: at 17:08

## 2022-09-26 RX ADMIN — PANTOPRAZOLE SODIUM 40 MILLIGRAM(S): 20 TABLET, DELAYED RELEASE ORAL at 06:12

## 2022-09-26 NOTE — PROGRESS NOTE ADULT - SUBJECTIVE AND OBJECTIVE BOX
Patient is a 52y old  Female who presents with dyspnea, orthopnea (26 Sep 2022 13:26)    PAST MEDICAL & SURGICAL HISTORY:  Diabetes    HTN (hypertension)    Neuropathy    Obesity    CAD    Former cigarette smoker  (smoked x 45 years; quit ~2013)    Marijuana smoker, continuous  (states smokes 3 - 4 times per day for pain management reasons)    Neuralgia    IBS (irritable bowel syndrome)    Complex regional pain syndrome type 1    s/p spinal nerve stimulator placed 07/2022    History of cholecystectomy    History of hand surgery    INTERVAL HISTORY: in no acute distress, still with MCCALL   	  MEDICATIONS:  MEDICATIONS  (STANDING):  buMETAnide Injectable 2 milliGRAM(s) IV Push two times a day  heparin   Injectable 7500 Unit(s) SubCutaneous every 12 hours  influenza   Vaccine 0.5 milliLiter(s) IntraMuscular once  insulin lispro (ADMELOG) corrective regimen sliding scale   SubCutaneous three times a day before meals  insulin lispro (ADMELOG) corrective regimen sliding scale   SubCutaneous at bedtime  lisinopril 10 milliGRAM(s) Oral daily  NIFEdipine XL 60 milliGRAM(s) Oral daily  pantoprazole    Tablet 40 milliGRAM(s) Oral before breakfast  pregabalin 100 milliGRAM(s) Oral every 8 hours  sertraline 50 milliGRAM(s) Oral daily  spironolactone 25 milliGRAM(s) Oral daily  tiotropium 18 MICROgram(s) Capsule 1 Capsule(s) Inhalation daily    MEDICATIONS  (PRN):  baclofen 5 milliGRAM(s) Oral every 8 hours PRN Muscle Spasm  dextrose Oral Gel 15 Gram(s) Oral once PRN Blood Glucose LESS THAN 70 milliGRAM(s)/deciliter  melatonin 3 milliGRAM(s) Oral at bedtime PRN Sleep  oxyCODONE    IR 30 milliGRAM(s) Oral every 4 hours PRN Severe Pain (7 - 10)  oxyCODONE    IR 15 milliGRAM(s) Oral every 4 hours PRN Moderate Pain (4 - 6)  simethicone 80 milliGRAM(s) Chew three times a day PRN Gas    Vitals:  T(F): 97.9 (09-26-22 @ 10:28), Max: 99.2 (09-26-22 @ 00:36)  HR: 62 (09-26-22 @ 10:28) (60 - 84)  BP: 108/74 (09-26-22 @ 10:28) (108/74 - 143/62)  RR: 18 (09-26-22 @ 10:28) (16 - 19)  SpO2: 100% (09-26-22 @ 10:28) (95% - 100%)    09-25 @ 07:01  -  09-26 @ 07:00  --------------------------------------------------------  IN:    Oral Fluid: 440 mL  Total IN: 440 mL    OUT:    Voided (mL): 4460 mL  Total OUT: 4460 mL    Total NET: -4020 mL    09-26 @ 07:01  -  09-26 @ 13:57  --------------------------------------------------------  IN:    Oral Fluid: 350 mL  Total IN: 350 mL    OUT:    Voided (mL): 850 mL  Total OUT: 850 mL    Total NET: -500 mL    PHYSICAL EXAM:  Neuro: Awake, responsive  CV: S1 S2 RRR  Lungs: diminished to bases   GI: Soft, BS +, ND, NT  Extremities: ++ LE edema    TELEMETRY: sinus    RADIOLOGY: < from: US Duplex Venous Lower Ext Complete, Bilateral (09.23.22 @ 15:59) >  No evidence of deep venous thrombosis in either lower extremity.    < end of copied text >  < from: CT Angio Chest PE Protocol w/ IV Cont (09.23.22 @ 15:15) >  IMPRESSION:  No evidence of central or lobar pulmonary emboli.    A combination of groundglass opacity and patchy airspace   disease/atelectasis involving the lungs bilaterally along with trace to   small pleural effusions.    < end of copied text >    DIAGNOSTIC TESTING:    [x ] Echocardiogram:< from: TTE with Doppler (w/Cont) (07.13.22 @ 12:53) >  Mitral Valve: Normal mitral valve. Minimal mitral  regurgitation.  Aortic Valve/Aorta: Normal trileaflet aortic valve. No  aortic valve regurgitation seen.  Aortic Root: 3.1 cm.  Left Atrium: Normal left atrium.  Left Ventricle: Hyperdynamic left ventricular systolic  function.  Endocardial visualization enhanced with  intravenous injection of Ultrasonic Enhancing Agent  (Definity). No left ventricular thrombus. Normal left  ventricular internal dimensions and wall thicknesses.  Normal diastolic function  Right Heart: Normal right atrium. Normal right ventricular  size and function. Normal tricuspid valve. Mild tricuspid  regurgitation. Normal pulmonic valve. Minimal pulmonic  regurgitation.  Pericardium/Pleura: Normal pericardium with no pericardial  effusion.  Hemodynamic: Estimated right atrial pressure is 8 mm Hg.  Estimated right ventricular systolic pressure equals 42 mm  Hg, assuming right atrial pressure equals 8 mm Hg,  consistent with mild pulmonary hypertension.  ------------------------------------------------------------------------  Conclusions:  1. Normal mitral valve. Minimal mitral regurgitation.  2. Normal trileaflet aortic valve. No aortic valve  regurgitation seen.  3. Hyperdynamic left ventricular systolic function.  Endocardial visualization enhanced with intravenous  injection of Ultrasonic Enhancing Agent (Definity). No left  ventricular thrombus.  4. Normal right ventricular size and function.    < end of copied text >    LABS:	 	    26 Sep 2022 08:13    139    |  103    |  13     ----------------------------<  227    3.6     |  32     |  0.83   24 Sep 2022 06:36    140    |  102    |  14     ----------------------------<  139    3.5     |  31     |  0.85     Ca    8.7        26 Sep 2022 08:13                        9.9    8.54  )-----------( 419      ( 26 Sep 2022 08:13 )             32.2 ,                       9.8    9.82  )-----------( 410      ( 24 Sep 2022 06:36 )             31.9

## 2022-09-26 NOTE — PROGRESS NOTE ADULT - SUBJECTIVE AND OBJECTIVE BOX
Patient is a 52y old  Female who presents with a chief complaint of dyspnea, orthopnea (26 Sep 2022 09:51)    INTERVAL HPI/OVERNIGHT EVENTS:    MEDICATIONS  (STANDING):  buMETAnide Injectable 2 milliGRAM(s) IV Push two times a day  dextrose 5%. 1000 milliLiter(s) (50 mL/Hr) IV Continuous <Continuous>  dextrose 5%. 1000 milliLiter(s) (100 mL/Hr) IV Continuous <Continuous>  dextrose 50% Injectable 25 Gram(s) IV Push once  dextrose 50% Injectable 12.5 Gram(s) IV Push once  dextrose 50% Injectable 25 Gram(s) IV Push once  glucagon  Injectable 1 milliGRAM(s) IntraMuscular once  heparin   Injectable 7500 Unit(s) SubCutaneous every 12 hours  influenza   Vaccine 0.5 milliLiter(s) IntraMuscular once  insulin lispro (ADMELOG) corrective regimen sliding scale   SubCutaneous three times a day before meals  insulin lispro (ADMELOG) corrective regimen sliding scale   SubCutaneous at bedtime  lisinopril 10 milliGRAM(s) Oral daily  NIFEdipine XL 60 milliGRAM(s) Oral daily  pantoprazole    Tablet 40 milliGRAM(s) Oral before breakfast  pregabalin 100 milliGRAM(s) Oral every 8 hours  sertraline 50 milliGRAM(s) Oral daily  spironolactone 25 milliGRAM(s) Oral daily  tiotropium 18 MICROgram(s) Capsule 1 Capsule(s) Inhalation daily    MEDICATIONS  (PRN):  baclofen 5 milliGRAM(s) Oral every 8 hours PRN Muscle Spasm  dextrose Oral Gel 15 Gram(s) Oral once PRN Blood Glucose LESS THAN 70 milliGRAM(s)/deciliter  melatonin 3 milliGRAM(s) Oral at bedtime PRN Sleep  oxyCODONE    IR 30 milliGRAM(s) Oral every 4 hours PRN Severe Pain (7 - 10)  oxyCODONE    IR 15 milliGRAM(s) Oral every 4 hours PRN Moderate Pain (4 - 6)  simethicone 80 milliGRAM(s) Chew three times a day PRN Gas    Allergies    amitriptyline (Other)    Intolerances      REVIEW OF SYSTEMS:  All other systems reviewed and are negative    Vital Signs Last 24 Hrs  T(C): 36.6 (26 Sep 2022 10:28), Max: 37.3 (26 Sep 2022 00:36)  T(F): 97.9 (26 Sep 2022 10:28), Max: 99.2 (26 Sep 2022 00:36)  HR: 62 (26 Sep 2022 10:28) (60 - 84)  BP: 108/74 (26 Sep 2022 10:28) (108/74 - 143/62)  BP(mean): --  RR: 18 (26 Sep 2022 10:28) (16 - 19)  SpO2: 100% (26 Sep 2022 10:28) (95% - 100%)    Parameters below as of 26 Sep 2022 10:28  Patient On (Oxygen Delivery Method): nasal cannula  O2 Flow (L/min): 4    Daily     Daily   I&O's Summary    25 Sep 2022 07:01  -  26 Sep 2022 07:00  --------------------------------------------------------  IN: 440 mL / OUT: 4460 mL / NET: -4020 mL    26 Sep 2022 07:01  -  26 Sep 2022 13:27  --------------------------------------------------------  IN: 350 mL / OUT: 850 mL / NET: -500 mL      CAPILLARY BLOOD GLUCOSE      POCT Blood Glucose.: 291 mg/dL (26 Sep 2022 12:27)  POCT Blood Glucose.: 285 mg/dL (26 Sep 2022 09:12)  POCT Blood Glucose.: 328 mg/dL (25 Sep 2022 21:40)  POCT Blood Glucose.: 270 mg/dL (25 Sep 2022 16:56)    PHYSICAL EXAM:  GENERAL: NAD,    HEAD:  Atraumatic, Normocephalic  EYES: EOMI, PERRLA, conjunctiva and sclera clear  ENMT: No tonsillar erythema, exudates, or enlargement; Moist mucous membranes, Good dentition, No lesions  NECK: Supple, No JVD, Normal thyroid  NERVOUS SYSTEM:  Alert & Oriented X3, Good concentration; Motor Strength 5/5 B/L upper and lower extremities; DTRs 2+ intact and symmetric  CHEST/LUNG: Clear to percussion bilaterally; No rales, rhonchi, wheezing, or rubs  HEART: Regular rate and rhythm; No murmurs, rubs, or gallops  ABDOMEN: Soft, Nontender, Nondistended; Bowel sounds present  EXTREMITIES:  2+ Peripheral Pulses, No clubbing, cyanosis, 2++ edema  LYMPH: No lymphadenopathy noted  SKIN: No rashes or lesions  Labs                          9.9    8.54  )-----------( 419      ( 26 Sep 2022 08:13 )             32.2     09-26    139  |  103  |  13  ----------------------------<  227<H>  3.6   |  32<H>  |  0.83    Ca    8.7      26 Sep 2022 08:13                          DVT prophylaxis: > Lovenox 40mg SQ daily  > Heparin   > SCD's

## 2022-09-26 NOTE — PROGRESS NOTE ADULT - ASSESSMENT
52F HTN, DM, CAD, chronic pain s/p spinal nerve stimulator, IBS, pseudotumor cerebri with three weeks of worsening LE edema, dyspnea, and orthopnea with weight gain.    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 responding to diuretics makes LVOT obstruction less likely as etiology of her presentation    -cont IV bumex 2mg BID  -cont spironolactone 25mg daily  -monitor renal function and electrolytes   -repeat TTE  -cont lisinopril  -eventual bblocker and optimization of her GDMT  -supplemental O2 as needed   -nutrition eval,  PT eval

## 2022-09-26 NOTE — PROGRESS NOTE ADULT - ASSESSMENT
REVIEW OF SYMPTOMS      Able to give ROS  Yes     RELIABLE +/-   CONSTITUTIONAL Weakness Yes  Chills No   ENDOCRINE  No heat or cold intolerance    ALLERGY No hives  Sore throat No stridor  RESP Coughing blood no  Shortness of breath YES   NEURO No Headache  Confusion Pain neck No   CARDIAC No Chest pain No Palpitations   GI  Pain abdomen NO   Vomiting NO     NOTABLE FINDINGS    PHYSICAL EXAM    HEENT Unremarkable  atraumatic   RESP Fair air entry EXP prolonged    Harsh breath sound Resp distres mild   CARDIAC S1 S2 No S3     NO JVD    ABDOMEN SOFT BS PRESENT NOT DISTENDED No hepatosplenomegaly PEDAL EDEMA present No calf tenderness  NO rash       AGE/SEX.   52 f  DOA.  9/22/2022   CC .  9/22/2022 from [facility] with sob fluid retentn incr o2 requiremnt     RECENT HOSPITAL STAY.  7/10-7/23 [facility] Fever cough sob  Rxed mf pneum needed intubatn ards then bpap   extubatd 7/17 needed proni     HOSPITAL COURSE.   Fever 9/23/2022  COPD  Obesity   RO DVR   RO OHS   RO HFPEF    PMH .  pmh Former smoker smoker 45 y quit 2013   pmh marijuana smoker   pmh DM2  pmh Hytn   pmh IBS   pmh CRPS (Complex regional pain syndrome)  pmh spinal nerve stimulatoir 7/8/2022   pms pseudotumor cerebri          GENERAL DATA .   GOC.   9/22/2022 full code     ALLGY.       amitriptiline                       WT.   9/22/2022 156        BMI.    9/22/2022 52                           ICU STAY.  none  COVID.  9/24/2022 scv2 (-)     BEST PRACTICE ISSUES.    HOB ELEVATN. Yes  DVT PPLX. 9/22/2022 hpsc 7500.2      EWING PPLX.   9/22/2022 protonix 40    INFN PPLX.    SP SW DAWIT.   7/19 FEES      DIET.  9/22/2022 cons carb               VS/ PO/IO/ VENT/ DRIPS.  9/26/2022 afeb 62 100/70   9/26/2022 4l 100%     ASSESSMENT/RECOMMENDATIONS .   RESP.  SHORTNESS OF BREATH.  Shortness of breath is likely multifactorial.  She had recent ards sec aspn pneum  She has morbid obesity  bmi 52  She may have OHS   She will need further eval as outpt including pfts and sleep study    COPD.  9/23/2022 spiriva   cont rx    DVT pplx   9/22/2022 Is on hpsc    RESP FAILURE.  9/24/2022 5a 12/5/.6 743/49/147  9/22/2022 CO2 is 27    Target po 90-95% pH 730 (+)  IS ON NOCT BPAP for  STAS   will need abg close to discharge to determine need for home nppv     RO PE.  D-dimr 9/22/2022 dd 461  ct ch 9/23 no pe  v duplx 9/23 (-)   no vte     INFECTION.  W 9/22-9/23-9/24/2022 w 14- 10.0 - 9.9   ct ch 9/23 no pe ggo and patchy airspace opac atelectasis bl  and tr pl effs  flu ab 9/22/2022 (-)   rsv 9/22/2022 (-)   rvp 9/24 (-)   CT ch findings likely resolving ards which she  had 7/10-7/23 admission [facility]     CARDIAC.  CAD.  tr 9/22/2022 tr 15  mi ruld out    HYTN.  9/22 nifedipine 60    bp controlld     HFPEF.  echo 7/13/2022 hyperdynamic lvsf n rvsf n  bnp 9/22/2022 bnp 87   9/22/2022 lisinopril 10   9/25 spironolact 25   9/25/2022 bumetanide 2.2   Cardio felt that low bnp may be false negv sec obsity and female sex and prior tte may not be indicative of current status   Good response to diuretics makes lvot obstrn less likely      GI.  no active issues    HEMAT.  Hb 9/22-9/24/2022 Hb 10 - 9.9  monitor     RENAL.  Na 9/22/2022 Na 145   Cr 9/22/2022 Cr .8   moniytor    TIME SPENT   Over 25 minutes aggregate care time spent on encounter; activities included   direct patient care, counseling and/or coordinating care reviewing notes, lab data/ imaging , discussion with multidisciplinary team/ patient  /family and explaining in detail risks, benefits, alternatives  of the recommendations     MARIBEL KELLY 52 f 1970 9/22/2022 DR JUVENCIO COKER

## 2022-09-26 NOTE — PROGRESS NOTE ADULT - SUBJECTIVE AND OBJECTIVE BOX
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;intercostal neuralgia&quot;)      Cardiac abnormality  (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;cardiac event&quot;; is unclear on diagnosis; denies hx/o MI)      Diabetes      Essential hypertension      IBS (irritable bowel syndrome)      Complex regional pain syndrome type 1      History of cholecystectomy      History of hand surgery  (pt states she had 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12)          FAMILY HISTORY:  FH: HTN (hypertension)        Home Medications:  benzonatate 100 mg oral capsule: 1 cap(s) orally 3 times a day (22 Jul 2022 16:48)  furosemide 40 mg oral tablet: 1 tab(s) orally once a day (22 Jul 2022 16:48)  guaiFENesin 1200 mg oral tablet, extended release: 1 tab(s) orally every 12 hours (22 Jul 2022 16:48)  lisinopril 10 mg oral tablet: 1 tab(s) orally once a day (22 Jul 2022 16:48)  mupirocin 2% topical ointment: 1 application topically 2 times a day (22 Jul 2022 16:48)  naloxegol 25 mg oral tablet: 1 tab(s) orally once a day (22 Jul 2022 16:48)  NIFEdipine 60 mg oral tablet, extended release: 1 tab(s) orally once a day (22 Jul 2022 16:48)  NovoLOG 100 units/mL injectable solution: 10 unit(s) subcutaneous 3 times a day (with meals) (11 Jul 2022 01:34)  oxyCODONE 15 mg oral tablet: 1 tab(s) orally every 4 hours, As needed, Severe Pain (7 - 10) (22 Jul 2022 16:48)  pantoprazole 40 mg oral delayed release tablet: 1 tab(s) orally once a day (before a meal) (22 Jul 2022 16:48)  pregabalin 100 mg oral capsule: 1 cap(s) orally every 8 hours (22 Jul 2022 16:48)  sertraline 50 mg oral tablet: 1 tab(s) orally once a day (22 Jul 2022 16:48)  Tresiba 100 units/mL subcutaneous solution: 80 unit(s) subcutaneous every 72 hours (11 Jul 2022 01:22)      MEDICATIONS  (STANDING):  buMETAnide Injectable 2 milliGRAM(s) IV Push two times a day  dextrose 5%. 1000 milliLiter(s) (100 mL/Hr) IV Continuous <Continuous>  dextrose 5%. 1000 milliLiter(s) (50 mL/Hr) IV Continuous <Continuous>  dextrose 50% Injectable 25 Gram(s) IV Push once  dextrose 50% Injectable 12.5 Gram(s) IV Push once  dextrose 50% Injectable 25 Gram(s) IV Push once  glucagon  Injectable 1 milliGRAM(s) IntraMuscular once  heparin   Injectable 7500 Unit(s) SubCutaneous every 12 hours  influenza   Vaccine 0.5 milliLiter(s) IntraMuscular once  insulin lispro (ADMELOG) corrective regimen sliding scale   SubCutaneous three times a day before meals  insulin lispro (ADMELOG) corrective regimen sliding scale   SubCutaneous at bedtime  lisinopril 10 milliGRAM(s) Oral daily  NIFEdipine XL 60 milliGRAM(s) Oral daily  pantoprazole    Tablet 40 milliGRAM(s) Oral before breakfast  pregabalin 100 milliGRAM(s) Oral every 8 hours  sertraline 50 milliGRAM(s) Oral daily  spironolactone 25 milliGRAM(s) Oral daily  tiotropium 18 MICROgram(s) Capsule 1 Capsule(s) Inhalation daily    MEDICATIONS  (PRN):  baclofen 5 milliGRAM(s) Oral every 8 hours PRN Muscle Spasm  dextrose Oral Gel 15 Gram(s) Oral once PRN Blood Glucose LESS THAN 70 milliGRAM(s)/deciliter  melatonin 3 milliGRAM(s) Oral at bedtime PRN Sleep  oxyCODONE    IR 30 milliGRAM(s) Oral every 4 hours PRN Severe Pain (7 - 10)  oxyCODONE    IR 15 milliGRAM(s) Oral every 4 hours PRN Moderate Pain (4 - 6)  simethicone 80 milliGRAM(s) Chew three times a day PRN Gas      Diet, Consistent Carbohydrate w/Evening Snack:   Low Sodium  Liquid Protein Supplement     Qty per Day:  1 pkg aiky (09-23-22 @ 15:34) [Active]          Vital Signs Last 24 Hrs  T(C): 36.5 (26 Sep 2022 05:33), Max: 37.3 (26 Sep 2022 00:36)  T(F): 97.7 (26 Sep 2022 05:33), Max: 99.2 (26 Sep 2022 00:36)  HR: 65 (26 Sep 2022 08:18) (60 - 84)  BP: 114/61 (26 Sep 2022 05:33) (113/63 - 143/62)  BP(mean): --  RR: 19 (26 Sep 2022 05:33) (16 - 19)  SpO2: 95% (26 Sep 2022 08:18) (95% - 100%)    Parameters below as of 26 Sep 2022 05:33  Patient On (Oxygen Delivery Method): BiPAP/CPAP          09-25-22 @ 07:01  -  09-26-22 @ 07:00  --------------------------------------------------------  IN: 440 mL / OUT: 4460 mL / NET: -4020 mL    09-26-22 @ 07:01  -  09-26-22 @ 09:52  --------------------------------------------------------  IN: 0 mL / OUT: 850 mL / NET: -850 mL              LABS:                        9.9    8.54  )-----------( 419      ( 26 Sep 2022 08:13 )             32.2     09-26    139  |  103  |  13  ----------------------------<  227<H>  3.6   |  32<H>  |  0.83    Ca    8.7      26 Sep 2022 08:13            ABG - ( 25 Sep 2022 06:33 )  pH, Arterial: 7.44  pH, Blood: x     /  pCO2: 48    /  pO2: 100   / HCO3: 33    / Base Excess: 7.2   /  SaO2: 98.2                WBC:  WBC Count: 8.54 K/uL (09-26 @ 08:13)  WBC Count: 9.82 K/uL (09-24 @ 06:36)  WBC Count: 10.68 K/uL (09-23 @ 07:45)      MICROBIOLOGY:  RECENT CULTURES:  09-23 .Blood Blood XXXX XXXX   No growth to date.                    Sodium:  Sodium, Serum: 139 mmol/L (09-26 @ 08:13)  Sodium, Serum: 140 mmol/L (09-24 @ 06:36)  Sodium, Serum: 142 mmol/L (09-23 @ 07:45)      0.83 mg/dL 09-26 @ 08:13  0.85 mg/dL 09-24 @ 06:36  0.79 mg/dL 09-23 @ 07:45      Hemoglobin:  Hemoglobin: 9.9 g/dL (09-26 @ 08:13)  Hemoglobin: 9.8 g/dL (09-24 @ 06:36)  Hemoglobin: 10.0 g/dL (09-23 @ 07:45)      Platelets: Platelet Count - Automated: 419 K/uL (09-26 @ 08:13)  Platelet Count - Automated: 410 K/uL (09-24 @ 06:36)  Platelet Count - Automated: 443 K/uL (09-23 @ 07:45)              RADIOLOGY & ADDITIONAL STUDIES:      MICROBIOLOGY:  RECENT CULTURES:  09-23 .Blood Blood XXXX XXXX   No growth to date.

## 2022-09-27 LAB
ANION GAP SERPL CALC-SCNC: 6 MMOL/L — SIGNIFICANT CHANGE UP (ref 5–17)
BUN SERPL-MCNC: 14 MG/DL — SIGNIFICANT CHANGE UP (ref 7–23)
CALCIUM SERPL-MCNC: 8.4 MG/DL — LOW (ref 8.5–10.1)
CHLORIDE SERPL-SCNC: 99 MMOL/L — SIGNIFICANT CHANGE UP (ref 96–108)
CHOLEST SERPL-MCNC: 148 MG/DL — SIGNIFICANT CHANGE UP
CO2 SERPL-SCNC: 32 MMOL/L — HIGH (ref 22–31)
CREAT SERPL-MCNC: 0.9 MG/DL — SIGNIFICANT CHANGE UP (ref 0.5–1.3)
EGFR: 77 ML/MIN/1.73M2 — SIGNIFICANT CHANGE UP
GLUCOSE SERPL-MCNC: 319 MG/DL — HIGH (ref 70–99)
HDLC SERPL-MCNC: 50 MG/DL — LOW
LIPID PNL WITH DIRECT LDL SERPL: 72 MG/DL — SIGNIFICANT CHANGE UP
MAGNESIUM SERPL-MCNC: 1.5 MG/DL — LOW (ref 1.6–2.6)
NON HDL CHOLESTEROL: 98 MG/DL — SIGNIFICANT CHANGE UP
POTASSIUM SERPL-MCNC: 3.5 MMOL/L — SIGNIFICANT CHANGE UP (ref 3.5–5.3)
POTASSIUM SERPL-SCNC: 3.5 MMOL/L — SIGNIFICANT CHANGE UP (ref 3.5–5.3)
SODIUM SERPL-SCNC: 137 MMOL/L — SIGNIFICANT CHANGE UP (ref 135–145)
TRIGL SERPL-MCNC: 126 MG/DL — SIGNIFICANT CHANGE UP
TSH SERPL-MCNC: 3.63 UIU/ML — SIGNIFICANT CHANGE UP (ref 0.36–3.74)

## 2022-09-27 PROCEDURE — 99233 SBSQ HOSP IP/OBS HIGH 50: CPT

## 2022-09-27 PROCEDURE — 99232 SBSQ HOSP IP/OBS MODERATE 35: CPT

## 2022-09-27 PROCEDURE — 99233 SBSQ HOSP IP/OBS HIGH 50: CPT | Mod: FS

## 2022-09-27 RX ORDER — POTASSIUM CHLORIDE 20 MEQ
40 PACKET (EA) ORAL ONCE
Refills: 0 | Status: COMPLETED | OUTPATIENT
Start: 2022-09-27 | End: 2022-09-27

## 2022-09-27 RX ORDER — INSULIN LISPRO 100/ML
4 VIAL (ML) SUBCUTANEOUS
Refills: 0 | Status: DISCONTINUED | OUTPATIENT
Start: 2022-09-27 | End: 2022-10-01

## 2022-09-27 RX ORDER — MAGNESIUM SULFATE 500 MG/ML
1 VIAL (ML) INJECTION ONCE
Refills: 0 | Status: COMPLETED | OUTPATIENT
Start: 2022-09-27 | End: 2022-09-27

## 2022-09-27 RX ORDER — ONDANSETRON 8 MG/1
4 TABLET, FILM COATED ORAL EVERY 8 HOURS
Refills: 0 | Status: DISCONTINUED | OUTPATIENT
Start: 2022-09-27 | End: 2022-10-04

## 2022-09-27 RX ORDER — INSULIN GLARGINE 100 [IU]/ML
20 INJECTION, SOLUTION SUBCUTANEOUS AT BEDTIME
Refills: 0 | Status: DISCONTINUED | OUTPATIENT
Start: 2022-09-27 | End: 2022-10-03

## 2022-09-27 RX ADMIN — Medication 3: at 07:46

## 2022-09-27 RX ADMIN — Medication 100 GRAM(S): at 10:04

## 2022-09-27 RX ADMIN — BUMETANIDE 2 MILLIGRAM(S): 0.25 INJECTION INTRAMUSCULAR; INTRAVENOUS at 17:54

## 2022-09-27 RX ADMIN — Medication 4: at 16:39

## 2022-09-27 RX ADMIN — OXYCODONE HYDROCHLORIDE 30 MILLIGRAM(S): 5 TABLET ORAL at 11:31

## 2022-09-27 RX ADMIN — Medication 100 MILLIGRAM(S): at 05:54

## 2022-09-27 RX ADMIN — OXYCODONE HYDROCHLORIDE 30 MILLIGRAM(S): 5 TABLET ORAL at 21:37

## 2022-09-27 RX ADMIN — Medication 4 UNIT(S): at 16:40

## 2022-09-27 RX ADMIN — OXYCODONE HYDROCHLORIDE 30 MILLIGRAM(S): 5 TABLET ORAL at 21:49

## 2022-09-27 RX ADMIN — Medication 5 MILLIGRAM(S): at 17:57

## 2022-09-27 RX ADMIN — BUMETANIDE 2 MILLIGRAM(S): 0.25 INJECTION INTRAMUSCULAR; INTRAVENOUS at 05:52

## 2022-09-27 RX ADMIN — HEPARIN SODIUM 7500 UNIT(S): 5000 INJECTION INTRAVENOUS; SUBCUTANEOUS at 05:53

## 2022-09-27 RX ADMIN — PANTOPRAZOLE SODIUM 40 MILLIGRAM(S): 20 TABLET, DELAYED RELEASE ORAL at 05:54

## 2022-09-27 RX ADMIN — TIOTROPIUM BROMIDE 1 CAPSULE(S): 18 CAPSULE ORAL; RESPIRATORY (INHALATION) at 11:31

## 2022-09-27 RX ADMIN — SPIRONOLACTONE 25 MILLIGRAM(S): 25 TABLET, FILM COATED ORAL at 05:54

## 2022-09-27 RX ADMIN — Medication 4: at 11:32

## 2022-09-27 RX ADMIN — ONDANSETRON 4 MILLIGRAM(S): 8 TABLET, FILM COATED ORAL at 13:59

## 2022-09-27 RX ADMIN — OXYCODONE HYDROCHLORIDE 30 MILLIGRAM(S): 5 TABLET ORAL at 12:30

## 2022-09-27 RX ADMIN — Medication 40 MILLIEQUIVALENT(S): at 10:03

## 2022-09-27 RX ADMIN — OXYCODONE HYDROCHLORIDE 15 MILLIGRAM(S): 5 TABLET ORAL at 06:34

## 2022-09-27 RX ADMIN — Medication 3 MILLIGRAM(S): at 23:38

## 2022-09-27 RX ADMIN — Medication 2: at 21:38

## 2022-09-27 RX ADMIN — INSULIN GLARGINE 20 UNIT(S): 100 INJECTION, SOLUTION SUBCUTANEOUS at 21:37

## 2022-09-27 RX ADMIN — SERTRALINE 50 MILLIGRAM(S): 25 TABLET, FILM COATED ORAL at 11:31

## 2022-09-27 RX ADMIN — OXYCODONE HYDROCHLORIDE 15 MILLIGRAM(S): 5 TABLET ORAL at 05:58

## 2022-09-27 RX ADMIN — SIMETHICONE 80 MILLIGRAM(S): 80 TABLET, CHEWABLE ORAL at 12:35

## 2022-09-27 RX ADMIN — HEPARIN SODIUM 7500 UNIT(S): 5000 INJECTION INTRAVENOUS; SUBCUTANEOUS at 17:55

## 2022-09-27 RX ADMIN — Medication 100 MILLIGRAM(S): at 21:36

## 2022-09-27 RX ADMIN — Medication 4 UNIT(S): at 11:35

## 2022-09-27 RX ADMIN — LISINOPRIL 10 MILLIGRAM(S): 2.5 TABLET ORAL at 05:54

## 2022-09-27 RX ADMIN — Medication 100 MILLIGRAM(S): at 14:25

## 2022-09-27 NOTE — PROGRESS NOTE ADULT - SUBJECTIVE AND OBJECTIVE BOX
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;intercostal neuralgia&quot;)      Cardiac abnormality  (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;cardiac event&quot;; is unclear on diagnosis; denies hx/o MI)      Diabetes      Essential hypertension      IBS (irritable bowel syndrome)      Complex regional pain syndrome type 1      History of cholecystectomy      History of hand surgery  (pt states she had 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12)          FAMILY HISTORY:  FH: HTN (hypertension)        Home Medications:  benzonatate 100 mg oral capsule: 1 cap(s) orally 3 times a day (22 Jul 2022 16:48)  furosemide 40 mg oral tablet: 1 tab(s) orally once a day (22 Jul 2022 16:48)  guaiFENesin 1200 mg oral tablet, extended release: 1 tab(s) orally every 12 hours (22 Jul 2022 16:48)  lisinopril 10 mg oral tablet: 1 tab(s) orally once a day (22 Jul 2022 16:48)  mupirocin 2% topical ointment: 1 application topically 2 times a day (22 Jul 2022 16:48)  naloxegol 25 mg oral tablet: 1 tab(s) orally once a day (22 Jul 2022 16:48)  NIFEdipine 60 mg oral tablet, extended release: 1 tab(s) orally once a day (22 Jul 2022 16:48)  NovoLOG 100 units/mL injectable solution: 10 unit(s) subcutaneous 3 times a day (with meals) (11 Jul 2022 01:34)  oxyCODONE 15 mg oral tablet: 1 tab(s) orally every 4 hours, As needed, Severe Pain (7 - 10) (22 Jul 2022 16:48)  pantoprazole 40 mg oral delayed release tablet: 1 tab(s) orally once a day (before a meal) (22 Jul 2022 16:48)  pregabalin 100 mg oral capsule: 1 cap(s) orally every 8 hours (22 Jul 2022 16:48)  sertraline 50 mg oral tablet: 1 tab(s) orally once a day (22 Jul 2022 16:48)  Tresiba 100 units/mL subcutaneous solution: 80 unit(s) subcutaneous every 72 hours (11 Jul 2022 01:22)      MEDICATIONS  (STANDING):  buMETAnide Injectable 2 milliGRAM(s) IV Push two times a day  dextrose 5%. 1000 milliLiter(s) (50 mL/Hr) IV Continuous <Continuous>  dextrose 5%. 1000 milliLiter(s) (100 mL/Hr) IV Continuous <Continuous>  dextrose 50% Injectable 25 Gram(s) IV Push once  dextrose 50% Injectable 12.5 Gram(s) IV Push once  dextrose 50% Injectable 25 Gram(s) IV Push once  glucagon  Injectable 1 milliGRAM(s) IntraMuscular once  heparin   Injectable 7500 Unit(s) SubCutaneous every 12 hours  influenza   Vaccine 0.5 milliLiter(s) IntraMuscular once  insulin lispro (ADMELOG) corrective regimen sliding scale   SubCutaneous at bedtime  insulin lispro (ADMELOG) corrective regimen sliding scale   SubCutaneous three times a day before meals  lisinopril 10 milliGRAM(s) Oral daily  NIFEdipine XL 60 milliGRAM(s) Oral daily  pantoprazole    Tablet 40 milliGRAM(s) Oral before breakfast  pregabalin 100 milliGRAM(s) Oral every 8 hours  sertraline 50 milliGRAM(s) Oral daily  spironolactone 25 milliGRAM(s) Oral daily  tiotropium 18 MICROgram(s) Capsule 1 Capsule(s) Inhalation daily    MEDICATIONS  (PRN):  baclofen 5 milliGRAM(s) Oral every 8 hours PRN Muscle Spasm  dextrose Oral Gel 15 Gram(s) Oral once PRN Blood Glucose LESS THAN 70 milliGRAM(s)/deciliter  melatonin 3 milliGRAM(s) Oral at bedtime PRN Sleep  oxyCODONE    IR 30 milliGRAM(s) Oral every 4 hours PRN Severe Pain (7 - 10)  oxyCODONE    IR 15 milliGRAM(s) Oral every 4 hours PRN Moderate Pain (4 - 6)  simethicone 80 milliGRAM(s) Chew three times a day PRN Gas      Diet, Consistent Carbohydrate w/Evening Snack:   Low Sodium  Liquid Protein Supplement     Qty per Day:  1 pkg aiky (09-23-22 @ 15:34) [Active]          Vital Signs Last 24 Hrs  T(C): 36.6 (27 Sep 2022 04:08), Max: 36.8 (26 Sep 2022 23:34)  T(F): 97.9 (27 Sep 2022 04:08), Max: 98.3 (26 Sep 2022 23:34)  HR: 83 (27 Sep 2022 08:27) (61 - 83)  BP: 122/66 (27 Sep 2022 04:08) (108/74 - 126/56)  BP(mean): --  RR: 18 (27 Sep 2022 04:08) (18 - 18)  SpO2: 96% (27 Sep 2022 08:27) (95% - 100%)    Parameters below as of 27 Sep 2022 04:08  Patient On (Oxygen Delivery Method): BiPAP/CPAP          09-26-22 @ 07:01  -  09-27-22 @ 07:00  --------------------------------------------------------  IN: 900 mL / OUT: 6350 mL / NET: -5450 mL              LABS:                        9.9    8.54  )-----------( 419      ( 26 Sep 2022 08:13 )             32.2     09-27    137  |  99  |  14  ----------------------------<  319<H>  3.5   |  32<H>  |  0.90    Ca    8.4<L>      27 Sep 2022 06:21  Mg     1.5     09-27                WBC:  WBC Count: 8.54 K/uL (09-26 @ 08:13)  WBC Count: 9.82 K/uL (09-24 @ 06:36)      MICROBIOLOGY:  RECENT CULTURES:  09-23 .Blood Blood XXXX XXXX   No growth to date.                    Sodium:  Sodium, Serum: 137 mmol/L (09-27 @ 06:21)  Sodium, Serum: 139 mmol/L (09-26 @ 08:13)  Sodium, Serum: 140 mmol/L (09-24 @ 06:36)      0.90 mg/dL 09-27 @ 06:21  0.83 mg/dL 09-26 @ 08:13  0.85 mg/dL 09-24 @ 06:36      Hemoglobin:  Hemoglobin: 9.9 g/dL (09-26 @ 08:13)  Hemoglobin: 9.8 g/dL (09-24 @ 06:36)      Platelets: Platelet Count - Automated: 419 K/uL (09-26 @ 08:13)  Platelet Count - Automated: 410 K/uL (09-24 @ 06:36)              RADIOLOGY & ADDITIONAL STUDIES:      MICROBIOLOGY:  RECENT CULTURES:  09-23 .Blood Blood XXXX XXXX   No growth to date.

## 2022-09-27 NOTE — PROGRESS NOTE ADULT - SUBJECTIVE AND OBJECTIVE BOX
Patient is a 52y old  Female who presents with a chief complaint of dyspnea, orthopnea (27 Sep 2022 10:16)    INTERVAL HPI/OVERNIGHT EVENTS: no events     MEDICATIONS  (STANDING):  buMETAnide Injectable 2 milliGRAM(s) IV Push two times a day  dextrose 5%. 1000 milliLiter(s) (50 mL/Hr) IV Continuous <Continuous>  dextrose 5%. 1000 milliLiter(s) (100 mL/Hr) IV Continuous <Continuous>  dextrose 50% Injectable 25 Gram(s) IV Push once  dextrose 50% Injectable 12.5 Gram(s) IV Push once  dextrose 50% Injectable 25 Gram(s) IV Push once  glucagon  Injectable 1 milliGRAM(s) IntraMuscular once  heparin   Injectable 7500 Unit(s) SubCutaneous every 12 hours  influenza   Vaccine 0.5 milliLiter(s) IntraMuscular once  insulin glargine Injectable (LANTUS) 20 Unit(s) SubCutaneous at bedtime  insulin lispro (ADMELOG) corrective regimen sliding scale   SubCutaneous at bedtime  insulin lispro (ADMELOG) corrective regimen sliding scale   SubCutaneous three times a day before meals  insulin lispro Injectable (ADMELOG) 4 Unit(s) SubCutaneous three times a day before meals  lisinopril 10 milliGRAM(s) Oral daily  NIFEdipine XL 60 milliGRAM(s) Oral daily  pantoprazole    Tablet 40 milliGRAM(s) Oral before breakfast  pregabalin 100 milliGRAM(s) Oral every 8 hours  sertraline 50 milliGRAM(s) Oral daily  spironolactone 25 milliGRAM(s) Oral daily  tiotropium 18 MICROgram(s) Capsule 1 Capsule(s) Inhalation daily    MEDICATIONS  (PRN):  baclofen 5 milliGRAM(s) Oral every 8 hours PRN Muscle Spasm  dextrose Oral Gel 15 Gram(s) Oral once PRN Blood Glucose LESS THAN 70 milliGRAM(s)/deciliter  melatonin 3 milliGRAM(s) Oral at bedtime PRN Sleep  oxyCODONE    IR 30 milliGRAM(s) Oral every 4 hours PRN Severe Pain (7 - 10)  oxyCODONE    IR 15 milliGRAM(s) Oral every 4 hours PRN Moderate Pain (4 - 6)  simethicone 80 milliGRAM(s) Chew three times a day PRN Gas    Allergies    amitriptyline (Other)    Intolerances      REVIEW OF SYSTEMS:  All other systems reviewed and are negative    Vital Signs Last 24 Hrs  T(C): 36.6 (27 Sep 2022 04:08), Max: 36.8 (26 Sep 2022 23:34)  T(F): 97.9 (27 Sep 2022 04:08), Max: 98.3 (26 Sep 2022 23:34)  HR: 83 (27 Sep 2022 08:27) (61 - 83)  BP: 122/66 (27 Sep 2022 04:08) (108/74 - 126/56)  BP(mean): --  RR: 18 (27 Sep 2022 04:08) (18 - 18)  SpO2: 96% (27 Sep 2022 08:27) (95% - 100%)    Parameters below as of 27 Sep 2022 04:08  Patient On (Oxygen Delivery Method): BiPAP/CPAP      Daily     Daily Weight in k.3 (27 Sep 2022 05:52)  I&O's Summary    26 Sep 2022 07:01  -  27 Sep 2022 07:00  --------------------------------------------------------  IN: 900 mL / OUT: 6350 mL / NET: -5450 mL      CAPILLARY BLOOD GLUCOSE      POCT Blood Glucose.: 299 mg/dL (27 Sep 2022 07:24)  POCT Blood Glucose.: 341 mg/dL (26 Sep 2022 22:16)  POCT Blood Glucose.: 273 mg/dL (26 Sep 2022 17:02)  POCT Blood Glucose.: 291 mg/dL (26 Sep 2022 12:27)    PHYSICAL EXAM:  GENERAL: NAD,    HEAD:  Atraumatic, Normocephalic  EYES: EOMI, PERRLA, conjunctiva and sclera clear  ENMT: No tonsillar erythema, exudates, or enlargement; Moist mucous membranes, Good dentition, No lesions  NECK: Supple, No JVD, Normal thyroid  NERVOUS SYSTEM:  Alert & Oriented X3, Good concentration; Motor Strength 5/5 B/L upper and lower extremities; DTRs 2+ intact and symmetric  CHEST/LUNG: Clear to percussion bilaterally; No rales, rhonchi, wheezing, or rubs  HEART: Regular rate and rhythm; No murmurs, rubs, or gallops  ABDOMEN: Soft, Nontender, Nondistended; Bowel sounds present  EXTREMITIES:  2+ Peripheral Pulses, No clubbing, cyanosis, or edema  LYMPH: No lymphadenopathy noted  SKIN: No rashes or lesions    Labs                          9.9    8.54  )-----------( 419      ( 26 Sep 2022 08:13 )             32.2         137  |  99  |  14  ----------------------------<  319<H>  3.5   |  32<H>  |  0.90    Ca    8.4<L>      27 Sep 2022 06:21  Mg     1.5                               DVT prophylaxis: > Lovenox 40mg SQ daily  > Heparin   > SCD's

## 2022-09-27 NOTE — PROGRESS NOTE ADULT - ASSESSMENT
REVIEW OF SYMPTOMS      Able to give ROS  Yes     RELIABLE +/-   CONSTITUTIONAL Weakness Yes  Chills No   ENDOCRINE  No heat or cold intolerance    ALLERGY No hives  Sore throat No stridor  RESP Coughing blood no  Shortness of breath YES   NEURO No Headache  Confusion Pain neck No   CARDIAC No Chest pain No Palpitations   GI  Pain abdomen NO   Vomiting NO     NOTABLE FINDINGS    PHYSICAL EXAM    HEENT Unremarkable  atraumatic   RESP Fair air entry EXP prolonged    Harsh breath sound Resp distres mild   CARDIAC S1 S2 No S3     NO JVD    ABDOMEN SOFT BS PRESENT NOT DISTENDED No hepatosplenomegaly PEDAL EDEMA present No calf tenderness  NO rash       AGE/SEX.   52 f  DOA.  9/22/2022   CC .  9/22/2022 from [facility] with sob fluid retentn incr o2 requiremnt     RECENT HOSPITAL STAY.  7/10-7/23 [facility] Fever cough sob  Rxed mf pneum needed intubatn ards then bpap   extubatd 7/17 needed proni     HOSPITAL COURSE.   Fever 9/23/2022  COPD  Obesity   RO DVR   RO OHS   RO HFPEF    PMH .  pmh Former smoker smoker 45 y quit 2013   pmh marijuana smoker   pmh DM2  pmh Hytn   pmh IBS   pmh CRPS (Complex regional pain syndrome)  pmh spinal nerve stimulatoir 7/8/2022   pms pseudotumor cerebri            GENERAL DATA .   GOC.   9/22/2022 full code     ALLGY.       amitriptiline                       WT.   9/22/2022 156        BMI.    9/22/2022 52                           ICU STAY.  none  COVID.  9/24/2022 scv2 (-)     BEST PRACTICE ISSUES.    HOB ELEVATN. Yes  DVT PPLX. 9/22/2022 hpsc 7500.2      EWING PPLX.   9/22/2022 protonix 40    INFN PPLX.    SP SW DAWIT.   7/19 FEES      DIET.  9/22/2022 cons carb                    VS/ PO/IO/ VENT/ DRIPS.  9/27/2022 afeb 80 130/70   9/27/2022 2l 94%   9/26/2022 afeb 62 100/70     ASSESSMENT/RECOMMENDATIONS .   RESP.  SHORTNESS OF BREATH.  Shortness of breath is likely multifactorial.  She had recent ards sec aspn pneum  She has morbid obesity  bmi 52  She may have OHS   She will need further eval as outpt including pfts and sleep study    COPD.  9/23/2022 spiriva   cont rx    DVT pplx   9/22/2022 Is on hpsc    RESP FAILURE.  9/24/2022 5a 12/5/.6 743/49/147  9/22/2022 CO2 is 27    Target po 90-95% pH 730 (+)  IS ON NOCT BPAP for  STAS   will need abg close to discharge to determine need for home nppv     RO PE.  D-dimr 9/22/2022 dd 461  ct ch 9/23 no pe  v duplx 9/23 (-)   no vte     INFECTION.  W 9/22-9/23-9/24/2022 w 14- 10.0 - 9.9   ct ch 9/23 no pe ggo and patchy airspace opac atelectasis bl  and tr pl effs  flu ab 9/22/2022 (-)   rsv 9/22/2022 (-)   rvp 9/24 (-)   CT ch findings likely resolving ards which she  had 7/10-7/23 admission [facility]     CARDIAC.  CAD.  tr 9/22/2022 tr 15  mi ruld out    HYTN.  9/22 nifedipine 60    bp controlld     HFPEF.  echo 7/13/2022 hyperdynamic lvsf n rvsf n  bnp 9/22/2022 bnp 87   9/22/2022 lisinopril 10   9/25 spironolact 25   9/25/2022 bumetanide 2.2   Cardio felt that low bnp may be false negv sec obsity and female sex and prior tte may not be indicative of current status   Good response to diuretics makes lvot obstrn less likely    To repeat tte when more euvolemic and able to lay flat    GI.  no active issues    HEMAT.  Hb 9/22-9/24/2022 Hb 10 - 9.9  monitor     RENAL.  Na 9/22/2022 Na 145   Cr 9/22/2022 Cr .8   monitor    TIME SPENT   Over 25 minutes aggregate care time spent on encounter; activities included   direct patient care, counseling and/or coordinating care reviewing notes, lab data/ imaging , discussion with multidisciplinary team/ patient  /family and explaining in detail risks, benefits, alternatives  of the recommendations     MARIBEL KELLY 52 f 1970 9/22/2022 DR JUVENCIO COKER

## 2022-09-27 NOTE — PROGRESS NOTE ADULT - TIME BILLING
Lab test review, Radiology Review, Vitals review, Consultant review and discussion, Physical examination, IDR, Assessment and plan; Plan discussion with patient and family

## 2022-09-27 NOTE — PROGRESS NOTE ADULT - SUBJECTIVE AND OBJECTIVE BOX
Patient is a 52y old  Female who presents with a chief complaint of dyspnea, orthopnea (27 Sep 2022 10:26)    PAST MEDICAL & SURGICAL HISTORY:  Diabetes    HTN (hypertension)    Neuropathy    Obesity    CAD    Former cigarette smoker  (smoked x 45 years; quit ~2013)    Marijuana smoker, continuous  (states smokes 3 - 4 times per day for pain management reasons)    IBS (irritable bowel syndrome)    Complex regional pain syndrome type 1    s/p spinal nerve stimulator placed 07/2022    History of cholecystectomy    History of hand surgery    INTERVAL HISTORY: resting in chair in no acute distress, feeling better   	  MEDICATIONS:  MEDICATIONS  (STANDING):  buMETAnide Injectable 2 milliGRAM(s) IV Push two times a day  heparin   Injectable 7500 Unit(s) SubCutaneous every 12 hours  influenza   Vaccine 0.5 milliLiter(s) IntraMuscular once  insulin glargine Injectable (LANTUS) 20 Unit(s) SubCutaneous at bedtime  insulin lispro (ADMELOG) corrective regimen sliding scale   SubCutaneous at bedtime  insulin lispro (ADMELOG) corrective regimen sliding scale   SubCutaneous three times a day before meals  insulin lispro Injectable (ADMELOG) 4 Unit(s) SubCutaneous three times a day before meals  lisinopril 10 milliGRAM(s) Oral daily  NIFEdipine XL 60 milliGRAM(s) Oral daily  pantoprazole    Tablet 40 milliGRAM(s) Oral before breakfast  pregabalin 100 milliGRAM(s) Oral every 8 hours  sertraline 50 milliGRAM(s) Oral daily  spironolactone 25 milliGRAM(s) Oral daily  tiotropium 18 MICROgram(s) Capsule 1 Capsule(s) Inhalation daily    MEDICATIONS  (PRN):  baclofen 5 milliGRAM(s) Oral every 8 hours PRN Muscle Spasm  dextrose Oral Gel 15 Gram(s) Oral once PRN Blood Glucose LESS THAN 70 milliGRAM(s)/deciliter  melatonin 3 milliGRAM(s) Oral at bedtime PRN Sleep  ondansetron Injectable 4 milliGRAM(s) IV Push every 8 hours PRN Nausea and/or Vomiting  oxyCODONE    IR 30 milliGRAM(s) Oral every 4 hours PRN Severe Pain (7 - 10)  oxyCODONE    IR 15 milliGRAM(s) Oral every 4 hours PRN Moderate Pain (4 - 6)  simethicone 80 milliGRAM(s) Chew three times a day PRN Gas    Vitals:  T(F): 97.7 (09-27-22 @ 10:35), Max: 98.3 (09-26-22 @ 23:34)  HR: 64 (09-27-22 @ 10:35) (61 - 83)  BP: 157/78 (09-27-22 @ 10:35) (122/66 - 157/78)  RR: 19 (09-27-22 @ 10:35) (18 - 19)  SpO2: 97% (09-27-22 @ 10:35) (91% - 99%)    09-26 @ 07:01  -  09-27 @ 07:00  --------------------------------------------------------  IN:    Oral Fluid: 900 mL  Total IN: 900 mL    OUT:    Voided (mL): 6350 mL  Total OUT: 6350 mL    Total NET: -5450 mL    09-27 @ 07:01  -  09-27 @ 14:13  --------------------------------------------------------  IN:    Oral Fluid: 418 mL  Total IN: 418 mL    OUT:    Voided (mL): 1000 mL  Total OUT: 1000 mL    Total NET: -582 mL    PHYSICAL EXAM:  Neuro: Awake, responsive  CV: S1 S2 RRR  Lungs: diminished to bases   GI: Soft, BS +, ND, NT  Extremities: + LE edema    RADIOLOGY: < from: US Duplex Venous Lower Ext Complete, Bilateral (09.23.22 @ 15:59) >  IMPRESSION:  No evidence of deep venous thrombosis in either lower extremity.    < end of copied text >  < from: CT Angio Chest PE Protocol w/ IV Cont (09.23.22 @ 15:15) >  No evidence of central or lobar pulmonary emboli.    A combination of groundglass opacity and patchy airspace   disease/atelectasis involving the lungs bilaterally along with trace to   small pleural effusions.    < end of copied text >    DIAGNOSTIC TESTING:    [x ] Echocardiogram: < from: TTE with Doppler (w/Cont) (07.13.22 @ 12:53) >  Mitral Valve: Normal mitral valve. Minimal mitral  regurgitation.  Aortic Valve/Aorta: Normal trileaflet aortic valve. No  aortic valve regurgitation seen.  Aortic Root: 3.1 cm.  Left Atrium: Normal left atrium.  Left Ventricle: Hyperdynamic left ventricular systolic  function.  Endocardial visualization enhanced with  intravenous injection of Ultrasonic Enhancing Agent  (Definity). No left ventricular thrombus. Normal left  ventricular internal dimensions and wall thicknesses.  Normal diastolic function  Right Heart: Normal right atrium. Normal right ventricular  size and function. Normal tricuspid valve. Mild tricuspid  regurgitation. Normal pulmonic valve. Minimal pulmonic  regurgitation.  Pericardium/Pleura: Normal pericardium with no pericardial  effusion.  Hemodynamic: Estimated right atrial pressure is 8 mm Hg.  Estimated right ventricular systolic pressure equals 42 mm  Hg, assuming right atrial pressure equals 8 mm Hg,  consistent with mild pulmonary hypertension.  ------------------------------------------------------------------------  Conclusions:  1. Normal mitral valve. Minimal mitral regurgitation.  2. Normal trileaflet aortic valve. No aortic valve  regurgitation seen.  3. Hyperdynamic left ventricular systolic function.  Endocardial visualization enhanced with intravenous  injection of Ultrasonic Enhancing Agent (Definity). No left  ventricular thrombus.  4. Normal right ventricular size and function.    < end of copied text >    LABS:	 	    27 Sep 2022 06:21    137    |  99     |  14     ----------------------------<  319    3.5     |  32     |  0.90   26 Sep 2022 08:13    139    |  103    |  13     ----------------------------<  227    3.6     |  32     |  0.83     Ca    8.4        27 Sep 2022 06:21  Mg     1.5       27 Sep 2022 06:21                        9.9    8.54  )-----------( 419      ( 26 Sep 2022 08:13 )             32.2   Lipid Profile: 09-27 @ 06:21  HDL/Total Cholesterol: --  HDL Chol:              50 mg/dL  Serum Chol:            148 mg/dL  Direct LDL:            --  Triglycerides:         126 mg/dL    TSH: Thyroid Stimulating Hormone, Serum: 3.630 uIU/mL (09-27 @ 06:21)

## 2022-09-27 NOTE — PROGRESS NOTE ADULT - ASSESSMENT
52F HTN, DM, CAD, chronic pain s/p spinal nerve stimulator, IBS, pseudotumor cerebri with three weeks of worsening LE edema, dyspnea, and orthopnea with weight gain.    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 responding to diuretics makes LVOT obstruction less likely as etiology of her presentation    -cont IV bumex 2mg BID  -cont spironolactone 25mg daily  -monitor renal function and electrolytes   -repeat TTE when more euvolemic and able to lay flat  -cont lisinopril  -eventual bblocker and optimization of her GDMT  -supplemental O2 as needed   -nutrition eval appreciated, fluid restriction  -activity as tolerated

## 2022-09-28 LAB
CULTURE RESULTS: SIGNIFICANT CHANGE UP
FLUAV AG NPH QL: SIGNIFICANT CHANGE UP
FLUBV AG NPH QL: SIGNIFICANT CHANGE UP
GLUCOSE BLDC GLUCOMTR-MCNC: 297 MG/DL — HIGH (ref 70–99)
GLUCOSE BLDC GLUCOMTR-MCNC: 333 MG/DL — HIGH (ref 70–99)
GLUCOSE BLDC GLUCOMTR-MCNC: 381 MG/DL — HIGH (ref 70–99)
SARS-COV-2 RNA SPEC QL NAA+PROBE: SIGNIFICANT CHANGE UP
SPECIMEN SOURCE: SIGNIFICANT CHANGE UP

## 2022-09-28 PROCEDURE — 99233 SBSQ HOSP IP/OBS HIGH 50: CPT

## 2022-09-28 PROCEDURE — 99232 SBSQ HOSP IP/OBS MODERATE 35: CPT

## 2022-09-28 PROCEDURE — 99233 SBSQ HOSP IP/OBS HIGH 50: CPT | Mod: FS

## 2022-09-28 PROCEDURE — 93306 TTE W/DOPPLER COMPLETE: CPT | Mod: 26

## 2022-09-28 RX ORDER — SENNA PLUS 8.6 MG/1
2 TABLET ORAL AT BEDTIME
Refills: 0 | Status: DISCONTINUED | OUTPATIENT
Start: 2022-09-28 | End: 2022-10-04

## 2022-09-28 RX ADMIN — Medication 100 MILLIGRAM(S): at 21:23

## 2022-09-28 RX ADMIN — OXYCODONE HYDROCHLORIDE 30 MILLIGRAM(S): 5 TABLET ORAL at 17:40

## 2022-09-28 RX ADMIN — OXYCODONE HYDROCHLORIDE 30 MILLIGRAM(S): 5 TABLET ORAL at 16:20

## 2022-09-28 RX ADMIN — SENNA PLUS 2 TABLET(S): 8.6 TABLET ORAL at 21:23

## 2022-09-28 RX ADMIN — PANTOPRAZOLE SODIUM 40 MILLIGRAM(S): 20 TABLET, DELAYED RELEASE ORAL at 05:58

## 2022-09-28 RX ADMIN — Medication 5 MILLIGRAM(S): at 21:24

## 2022-09-28 RX ADMIN — HEPARIN SODIUM 7500 UNIT(S): 5000 INJECTION INTRAVENOUS; SUBCUTANEOUS at 17:10

## 2022-09-28 RX ADMIN — Medication 4: at 11:38

## 2022-09-28 RX ADMIN — Medication 4 UNIT(S): at 11:38

## 2022-09-28 RX ADMIN — Medication 4 UNIT(S): at 09:02

## 2022-09-28 RX ADMIN — INSULIN GLARGINE 20 UNIT(S): 100 INJECTION, SOLUTION SUBCUTANEOUS at 21:24

## 2022-09-28 RX ADMIN — Medication 100 MILLIGRAM(S): at 05:40

## 2022-09-28 RX ADMIN — OXYCODONE HYDROCHLORIDE 15 MILLIGRAM(S): 5 TABLET ORAL at 05:58

## 2022-09-28 RX ADMIN — LISINOPRIL 10 MILLIGRAM(S): 2.5 TABLET ORAL at 05:41

## 2022-09-28 RX ADMIN — Medication 100 MILLIGRAM(S): at 17:09

## 2022-09-28 RX ADMIN — TIOTROPIUM BROMIDE 1 CAPSULE(S): 18 CAPSULE ORAL; RESPIRATORY (INHALATION) at 11:39

## 2022-09-28 RX ADMIN — OXYCODONE HYDROCHLORIDE 30 MILLIGRAM(S): 5 TABLET ORAL at 11:46

## 2022-09-28 RX ADMIN — SPIRONOLACTONE 25 MILLIGRAM(S): 25 TABLET, FILM COATED ORAL at 05:40

## 2022-09-28 RX ADMIN — OXYCODONE HYDROCHLORIDE 30 MILLIGRAM(S): 5 TABLET ORAL at 15:37

## 2022-09-28 RX ADMIN — Medication 4 UNIT(S): at 17:10

## 2022-09-28 RX ADMIN — Medication 4: at 09:01

## 2022-09-28 RX ADMIN — Medication 3: at 17:09

## 2022-09-28 RX ADMIN — BUMETANIDE 2 MILLIGRAM(S): 0.25 INJECTION INTRAMUSCULAR; INTRAVENOUS at 05:41

## 2022-09-28 RX ADMIN — SERTRALINE 50 MILLIGRAM(S): 25 TABLET, FILM COATED ORAL at 11:39

## 2022-09-28 RX ADMIN — HEPARIN SODIUM 7500 UNIT(S): 5000 INJECTION INTRAVENOUS; SUBCUTANEOUS at 05:42

## 2022-09-28 RX ADMIN — BUMETANIDE 2 MILLIGRAM(S): 0.25 INJECTION INTRAMUSCULAR; INTRAVENOUS at 17:08

## 2022-09-28 RX ADMIN — Medication 6: at 21:25

## 2022-09-28 NOTE — PROGRESS NOTE ADULT - SUBJECTIVE AND OBJECTIVE BOX
Patient is a 52y old  Female who presents with a chief complaint of dyspnea, orthopnea (28 Sep 2022 06:36)    PAST MEDICAL & SURGICAL HISTORY:    INTERVAL HISTORY:  	  MEDICATIONS:  MEDICATIONS  (STANDING):  buMETAnide Injectable 2 milliGRAM(s) IV Push two times a day  heparin   Injectable 7500 Unit(s) SubCutaneous every 12 hours  influenza   Vaccine 0.5 milliLiter(s) IntraMuscular once  insulin glargine Injectable (LANTUS) 20 Unit(s) SubCutaneous at bedtime  insulin lispro (ADMELOG) corrective regimen sliding scale   SubCutaneous at bedtime  insulin lispro (ADMELOG) corrective regimen sliding scale   SubCutaneous three times a day before meals  insulin lispro Injectable (ADMELOG) 4 Unit(s) SubCutaneous three times a day before meals  lisinopril 10 milliGRAM(s) Oral daily  NIFEdipine XL 60 milliGRAM(s) Oral daily  pantoprazole    Tablet 40 milliGRAM(s) Oral before breakfast  pregabalin 100 milliGRAM(s) Oral every 8 hours  sertraline 50 milliGRAM(s) Oral daily  spironolactone 25 milliGRAM(s) Oral daily  tiotropium 18 MICROgram(s) Capsule 1 Capsule(s) Inhalation daily    MEDICATIONS  (PRN):  baclofen 5 milliGRAM(s) Oral every 8 hours PRN Muscle Spasm  dextrose Oral Gel 15 Gram(s) Oral once PRN Blood Glucose LESS THAN 70 milliGRAM(s)/deciliter  melatonin 3 milliGRAM(s) Oral at bedtime PRN Sleep  ondansetron Injectable 4 milliGRAM(s) IV Push every 8 hours PRN Nausea and/or Vomiting  oxyCODONE    IR 30 milliGRAM(s) Oral every 4 hours PRN Severe Pain (7 - 10)  oxyCODONE    IR 15 milliGRAM(s) Oral every 4 hours PRN Moderate Pain (4 - 6)  simethicone 80 milliGRAM(s) Chew three times a day PRN Gas      Vitals:  T(F): 98 (09-28-22 @ 04:54), Max: 98.3 (09-27-22 @ 23:53)  HR: 65 (09-28-22 @ 08:55) (60 - 83)  BP: 137/80 (09-28-22 @ 04:54) (122/89 - 138/72)  RR: 18 (09-28-22 @ 04:54) (16 - 20)  SpO2: 97% (09-28-22 @ 08:55) (85% - 99%)  Wt(kg): --    09-27 @ 07:01  -  09-28 @ 07:00  --------------------------------------------------------  IN:    Oral Fluid: 968 mL  Total IN: 968 mL    OUT:    Voided (mL): 4800 mL  Total OUT: 4800 mL    Total NET: -3832 mL      09-28 @ 07:01  -  09-28 @ 12:02  --------------------------------------------------------  IN:  Total IN: 0 mL    OUT:    Voided (mL): 900 mL  Total OUT: 900 mL    Total NET: -900 mL              PHYSICAL EXAM:  Neuro: Awake, responsive  CV: S1 S2 RRR  Lungs: CTABL  GI: Soft, BS +, ND, NT  Extremities: No edema    TELEMETRY:   	    ECG:  	    RADIOLOGY:     DIAGNOSTIC TESTING:    [ ] Echocardiogram:  [ ] Cardiac Catheterization:  [ ] Stress Test:      LABS:	 	    27 Sep 2022 06:21    137    |  99     |  14     ----------------------------<  319    3.5     |  32     |  0.90   26 Sep 2022 08:13    139    |  103    |  13     ----------------------------<  227    3.6     |  32     |  0.83     Ca    8.4        27 Sep 2022 06:21  Mg     1.5       27 Sep 2022 06:21                       9.9    8.54  )-----------( 419      ( 26 Sep 2022 08:13 )             32.2     Lipid Profile: 09-27 @ 06:21  HDL/Total Cholesterol: --  HDL Chol:              50 mg/dL  Serum Chol:            148 mg/dL  Direct LDL:            --  Triglycerides:         126 mg/dL    TSH: Thyroid Stimulating Hormone, Serum: 3.630 uIU/mL (09-27 @ 06:21)                 Patient is a 52y old  Female who presents with a chief complaint of dyspnea, orthopnea (28 Sep 2022 06:36)    PAST MEDICAL & SURGICAL HISTORY:  Diabetes    HTN (hypertension)    Neuropathy    Obesity    CAD    Former cigarette smoker  (smoked x 45 years; quit ~2013)    Marijuana smoker, continuous  (states smokes 3 - 4 times per day for pain management reasons)    IBS (irritable bowel syndrome)    Complex regional pain syndrome type 1    s/p spinal nerve stimulator placed 07/2022    History of cholecystectomy    History of hand surgery    INTERVAL HISTORY: in no acute distress, feeling better, denies any chest pain , MCCALL improving   	  MEDICATIONS:  MEDICATIONS  (STANDING):  buMETAnide Injectable 2 milliGRAM(s) IV Push two times a day  heparin   Injectable 7500 Unit(s) SubCutaneous every 12 hours  influenza   Vaccine 0.5 milliLiter(s) IntraMuscular once  insulin glargine Injectable (LANTUS) 20 Unit(s) SubCutaneous at bedtime  insulin lispro (ADMELOG) corrective regimen sliding scale   SubCutaneous at bedtime  insulin lispro (ADMELOG) corrective regimen sliding scale   SubCutaneous three times a day before meals  insulin lispro Injectable (ADMELOG) 4 Unit(s) SubCutaneous three times a day before meals  lisinopril 10 milliGRAM(s) Oral daily  NIFEdipine XL 60 milliGRAM(s) Oral daily  pantoprazole    Tablet 40 milliGRAM(s) Oral before breakfast  pregabalin 100 milliGRAM(s) Oral every 8 hours  sertraline 50 milliGRAM(s) Oral daily  spironolactone 25 milliGRAM(s) Oral daily  tiotropium 18 MICROgram(s) Capsule 1 Capsule(s) Inhalation daily    MEDICATIONS  (PRN):  baclofen 5 milliGRAM(s) Oral every 8 hours PRN Muscle Spasm  dextrose Oral Gel 15 Gram(s) Oral once PRN Blood Glucose LESS THAN 70 milliGRAM(s)/deciliter  melatonin 3 milliGRAM(s) Oral at bedtime PRN Sleep  ondansetron Injectable 4 milliGRAM(s) IV Push every 8 hours PRN Nausea and/or Vomiting  oxyCODONE    IR 30 milliGRAM(s) Oral every 4 hours PRN Severe Pain (7 - 10)  oxyCODONE    IR 15 milliGRAM(s) Oral every 4 hours PRN Moderate Pain (4 - 6)  simethicone 80 milliGRAM(s) Chew three times a day PRN Gas    Vitals:  T(F): 98 (09-28-22 @ 04:54), Max: 98.3 (09-27-22 @ 23:53)  HR: 65 (09-28-22 @ 08:55) (60 - 83)  BP: 137/80 (09-28-22 @ 04:54) (122/89 - 138/72)  RR: 18 (09-28-22 @ 04:54) (16 - 20)  SpO2: 97% (09-28-22 @ 08:55) (85% - 99%)    09-27 @ 07:01  -  09-28 @ 07:00  --------------------------------------------------------  IN:    Oral Fluid: 968 mL  Total IN: 968 mL    OUT:    Voided (mL): 4800 mL  Total OUT: 4800 mL    Total NET: -3832 mL    09-28 @ 07:01  -  09-28 @ 12:02  --------------------------------------------------------  IN:  Total IN: 0 mL    OUT:    Voided (mL): 900 mL  Total OUT: 900 mL    Total NET: -900 mL    PHYSICAL EXAM:  Neuro: Awake, responsive  CV: S1 S2 RRR  Lungs: diminished to bases  GI: Soft, BS +, ND, NT  Extremities: + LE edema    RADIOLOGY:  < from: CT Angio Chest PE Protocol w/ IV Cont (09.23.22 @ 15:15) >  No evidence of central or lobar pulmonary emboli.    A combination of groundglass opacity and patchy airspace   disease/atelectasis involving the lungs bilaterally along with trace to   small pleural effusions.    < end of copied text >    DIAGNOSTIC TESTING:    [x ] Echocardiogram: < from: TTE with Doppler (w/Cont) (07.13.22 @ 12:53) >  EF (Camacho Rule): 79 %  ------------------------------------------------------------------------  Observations:  Mitral Valve: Normal mitral valve. Minimal mitral  regurgitation.  Aortic Valve/Aorta: Normal trileaflet aortic valve. No  aortic valve regurgitation seen.  Aortic Root: 3.1 cm.  Left Atrium: Normal left atrium.  Left Ventricle: Hyperdynamic left ventricular systolic  function.  Endocardial visualization enhanced with  intravenous injection of Ultrasonic Enhancing Agent  (Definity). No left ventricular thrombus. Normal left  ventricular internal dimensions and wall thicknesses.  Normal diastolic function  Right Heart: Normal right atrium. Normal right ventricular  size and function. Normal tricuspid valve. Mild tricuspid  regurgitation. Normal pulmonic valve. Minimal pulmonic  regurgitation.  Pericardium/Pleura: Normal pericardium with no pericardial  effusion.  Hemodynamic: Estimated right atrial pressure is 8 mm Hg.  Estimated right ventricular systolic pressure equals 42 mm  Hg, assuming right atrial pressure equals 8 mm Hg,  consistent with mild pulmonary hypertension.  ------------------------------------------------------------------------  Conclusions:  1. Normal mitral valve. Minimal mitral regurgitation.  2. Normal trileaflet aortic valve. No aortic valve  regurgitation seen.  3. Hyperdynamic left ventricular systolic function.  Endocardial visualization enhanced with intravenous  injection of Ultrasonic Enhancing Agent (Definity). No left  ventricular thrombus.  4. Normal right ventricular size and function.  *** No previous Echo exam.    < end of copied text >    LABS:	 	    27 Sep 2022 06:21    137    |  99     |  14     ----------------------------<  319    3.5     |  32     |  0.90   26 Sep 2022 08:13    139    |  103    |  13     ----------------------------<  227    3.6     |  32     |  0.83     Ca    8.4        27 Sep 2022 06:21  Mg     1.5       27 Sep 2022 06:21                       9.9    8.54  )-----------( 419      ( 26 Sep 2022 08:13 )             32.2     Lipid Profile: 09-27 @ 06:21  HDL/Total Cholesterol: --  HDL Chol:              50 mg/dL  Serum Chol:            148 mg/dL  Direct LDL:            --  Triglycerides:         126 mg/dL    TSH: Thyroid Stimulating Hormone, Serum: 3.630 uIU/mL (09-27 @ 06:21)                 Patient is a 52y old  Female who presents with a chief complaint of dyspnea, orthopnea (28 Sep 2022 06:36)    PAST MEDICAL & SURGICAL HISTORY:  Diabetes    HTN (hypertension)    Neuropathy    Obesity    CAD    Former cigarette smoker  (smoked x 45 years; quit ~2013)    Marijuana smoker, continuous  (states smokes 3 - 4 times per day for pain management reasons)    IBS (irritable bowel syndrome)    Complex regional pain syndrome type 1    s/p spinal nerve stimulator placed 07/2022    History of cholecystectomy    History of hand surgery    INTERVAL HISTORY: in no acute distress, feeling better, denies any chest pain , MCCALL improving   	  MEDICATIONS:  MEDICATIONS  (STANDING):  buMETAnide Injectable 2 milliGRAM(s) IV Push two times a day  heparin   Injectable 7500 Unit(s) SubCutaneous every 12 hours  influenza   Vaccine 0.5 milliLiter(s) IntraMuscular once  insulin glargine Injectable (LANTUS) 20 Unit(s) SubCutaneous at bedtime  insulin lispro (ADMELOG) corrective regimen sliding scale   SubCutaneous at bedtime  insulin lispro (ADMELOG) corrective regimen sliding scale   SubCutaneous three times a day before meals  insulin lispro Injectable (ADMELOG) 4 Unit(s) SubCutaneous three times a day before meals  lisinopril 10 milliGRAM(s) Oral daily  NIFEdipine XL 60 milliGRAM(s) Oral daily  pantoprazole    Tablet 40 milliGRAM(s) Oral before breakfast  pregabalin 100 milliGRAM(s) Oral every 8 hours  sertraline 50 milliGRAM(s) Oral daily  spironolactone 25 milliGRAM(s) Oral daily  tiotropium 18 MICROgram(s) Capsule 1 Capsule(s) Inhalation daily    MEDICATIONS  (PRN):  baclofen 5 milliGRAM(s) Oral every 8 hours PRN Muscle Spasm  dextrose Oral Gel 15 Gram(s) Oral once PRN Blood Glucose LESS THAN 70 milliGRAM(s)/deciliter  melatonin 3 milliGRAM(s) Oral at bedtime PRN Sleep  ondansetron Injectable 4 milliGRAM(s) IV Push every 8 hours PRN Nausea and/or Vomiting  oxyCODONE    IR 30 milliGRAM(s) Oral every 4 hours PRN Severe Pain (7 - 10)  oxyCODONE    IR 15 milliGRAM(s) Oral every 4 hours PRN Moderate Pain (4 - 6)  simethicone 80 milliGRAM(s) Chew three times a day PRN Gas    Vitals:  T(F): 98 (09-28-22 @ 04:54), Max: 98.3 (09-27-22 @ 23:53)  HR: 65 (09-28-22 @ 08:55) (60 - 83)  BP: 137/80 (09-28-22 @ 04:54) (122/89 - 138/72)  RR: 18 (09-28-22 @ 04:54) (16 - 20)  SpO2: 97% (09-28-22 @ 08:55) (85% - 99%)    09-27 @ 07:01  -  09-28 @ 07:00  --------------------------------------------------------  IN:    Oral Fluid: 968 mL  Total IN: 968 mL    OUT:    Voided (mL): 4800 mL  Total OUT: 4800 mL    Total NET: -3832 mL    09-28 @ 07:01  -  09-28 @ 12:02  --------------------------------------------------------  IN:  Total IN: 0 mL    OUT:    Voided (mL): 900 mL  Total OUT: 900 mL    Total NET: -900 mL    PHYSICAL EXAM:  Neuro: Awake, responsive  CV: S1 S2 RRR  Lungs: diminished to bases  GI: Soft, BS +, ND, NT  Extremities: + LE edema    RADIOLOGY:  < from: CT Angio Chest PE Protocol w/ IV Cont (09.23.22 @ 15:15) >  No evidence of central or lobar pulmonary emboli.    A combination of groundglass opacity and patchy airspace   disease/atelectasis involving the lungs bilaterally along with trace to   small pleural effusions.    < end of copied text >    DIAGNOSTIC TESTING:    [x ] Echocardiogram:     < from: TTE with Doppler (w/Cont) (07.13.22 @ 12:53) >  EF (Camacho Rule): 79 %  ------------------------------------------------------------------------  Observations:  Mitral Valve: Normal mitral valve. Minimal mitral  regurgitation.  Aortic Valve/Aorta: Normal trileaflet aortic valve. No  aortic valve regurgitation seen.  Aortic Root: 3.1 cm.  Left Atrium: Normal left atrium.  Left Ventricle: Hyperdynamic left ventricular systolic  function.  Endocardial visualization enhanced with  intravenous injection of Ultrasonic Enhancing Agent  (Definity). No left ventricular thrombus. Normal left  ventricular internal dimensions and wall thicknesses.  Normal diastolic function  Right Heart: Normal right atrium. Normal right ventricular  size and function. Normal tricuspid valve. Mild tricuspid  regurgitation. Normal pulmonic valve. Minimal pulmonic  regurgitation.  Pericardium/Pleura: Normal pericardium with no pericardial  effusion.  Hemodynamic: Estimated right atrial pressure is 8 mm Hg.  Estimated right ventricular systolic pressure equals 42 mm  Hg, assuming right atrial pressure equals 8 mm Hg,  consistent with mild pulmonary hypertension.  ------------------------------------------------------------------------  Conclusions:  1. Normal mitral valve. Minimal mitral regurgitation.  2. Normal trileaflet aortic valve. No aortic valve  regurgitation seen.  3. Hyperdynamic left ventricular systolic function.  Endocardial visualization enhanced with intravenous  injection of Ultrasonic Enhancing Agent (Definity). No left  ventricular thrombus.  4. Normal right ventricular size and function.  *** No previous Echo exam.    < end of copied text >    LABS:	 	    27 Sep 2022 06:21    137    |  99     |  14     ----------------------------<  319    3.5     |  32     |  0.90   26 Sep 2022 08:13    139    |  103    |  13     ----------------------------<  227    3.6     |  32     |  0.83     Ca    8.4        27 Sep 2022 06:21  Mg     1.5       27 Sep 2022 06:21                       9.9    8.54  )-----------( 419      ( 26 Sep 2022 08:13 )             32.2     Lipid Profile: 09-27 @ 06:21  HDL/Total Cholesterol: --  HDL Chol:              50 mg/dL  Serum Chol:            148 mg/dL  Direct LDL:            --  Triglycerides:         126 mg/dL    TSH: Thyroid Stimulating Hormone, Serum: 3.630 uIU/mL (09-27 @ 06:21)

## 2022-09-28 NOTE — PROGRESS NOTE ADULT - SUBJECTIVE AND OBJECTIVE BOX
Patient is a 52y old  Female who presents with a chief complaint of dyspnea, orthopnea (28 Sep 2022 12:02)      INTERVAL HPI/OVERNIGHT EVENTS:  Pt was seen and examined, no acute events.      MEDICATIONS  (STANDING):  buMETAnide Injectable 2 milliGRAM(s) IV Push two times a day  dextrose 5%. 1000 milliLiter(s) (50 mL/Hr) IV Continuous <Continuous>  dextrose 5%. 1000 milliLiter(s) (100 mL/Hr) IV Continuous <Continuous>  dextrose 50% Injectable 25 Gram(s) IV Push once  dextrose 50% Injectable 12.5 Gram(s) IV Push once  dextrose 50% Injectable 25 Gram(s) IV Push once  glucagon  Injectable 1 milliGRAM(s) IntraMuscular once  heparin   Injectable 7500 Unit(s) SubCutaneous every 12 hours  influenza   Vaccine 0.5 milliLiter(s) IntraMuscular once  insulin glargine Injectable (LANTUS) 20 Unit(s) SubCutaneous at bedtime  insulin lispro (ADMELOG) corrective regimen sliding scale   SubCutaneous at bedtime  insulin lispro (ADMELOG) corrective regimen sliding scale   SubCutaneous three times a day before meals  insulin lispro Injectable (ADMELOG) 4 Unit(s) SubCutaneous three times a day before meals  lisinopril 10 milliGRAM(s) Oral daily  NIFEdipine XL 60 milliGRAM(s) Oral daily  pantoprazole    Tablet 40 milliGRAM(s) Oral before breakfast  pregabalin 100 milliGRAM(s) Oral every 8 hours  sertraline 50 milliGRAM(s) Oral daily  spironolactone 25 milliGRAM(s) Oral daily  tiotropium 18 MICROgram(s) Capsule 1 Capsule(s) Inhalation daily    MEDICATIONS  (PRN):  baclofen 5 milliGRAM(s) Oral every 8 hours PRN Muscle Spasm  dextrose Oral Gel 15 Gram(s) Oral once PRN Blood Glucose LESS THAN 70 milliGRAM(s)/deciliter  melatonin 3 milliGRAM(s) Oral at bedtime PRN Sleep  ondansetron Injectable 4 milliGRAM(s) IV Push every 8 hours PRN Nausea and/or Vomiting  oxyCODONE    IR 30 milliGRAM(s) Oral every 4 hours PRN Severe Pain (7 - 10)  oxyCODONE    IR 15 milliGRAM(s) Oral every 4 hours PRN Moderate Pain (4 - 6)  simethicone 80 milliGRAM(s) Chew three times a day PRN Gas      Allergies  amitriptyline (Other)      Vital Signs Last 24 Hrs  T(C): 36.6 (28 Sep 2022 12:02), Max: 36.8 (27 Sep 2022 15:55)  T(F): 97.9 (28 Sep 2022 12:02), Max: 98.3 (27 Sep 2022 23:53)  HR: 62 (28 Sep 2022 12:02) (60 - 83)  BP: 104/68 (28 Sep 2022 12:02) (104/68 - 138/72)  BP(mean): --  RR: 18 (28 Sep 2022 12:02) (16 - 20)  SpO2: 98% (28 Sep 2022 12:02) (85% - 99%)    Parameters below as of 28 Sep 2022 12:02  Patient On (Oxygen Delivery Method): nasal cannula        PHYSICAL EXAM:  GENERAL: NAD,    HEAD:  Atraumatic, Normocephalic  EYES: EOMI, PERRLA, conjunctiva and sclera clear  ENMT: No tonsillar erythema, exudates, or enlargement; Moist mucous membranes, Good dentition, No lesions  NECK: Supple, No JVD, Normal thyroid  NERVOUS SYSTEM:  Alert & Oriented X3, Good concentration; Motor Strength 5/5 B/L upper and lower extremities; DTRs 2+ intact and symmetric  CHEST/LUNG: Clear to percussion bilaterally; No rales, rhonchi, wheezing, or rubs  HEART: Regular rate and rhythm; No murmurs, rubs, or gallops  ABDOMEN: Soft, Nontender, Nondistended; Bowel sounds present  EXTREMITIES:  2+ Peripheral Pulses, No clubbing, cyanosis, or edema  LYMPH: No lymphadenopathy noted  SKIN: No rashes or lesions      LABS:    09-27    137  |  99  |  14  ----------------------------<  319<H>  3.5   |  32<H>  |  0.90    Ca    8.4<L>      27 Sep 2022 06:21  Mg     1.5     09-27          CAPILLARY BLOOD GLUCOSE      POCT Blood Glucose.: 333 mg/dL (28 Sep 2022 11:30)  POCT Blood Glucose.: 318 mg/dL (28 Sep 2022 08:24)  POCT Blood Glucose.: 296 mg/dL (27 Sep 2022 21:25)  POCT Blood Glucose.: 315 mg/dL (27 Sep 2022 16:11)      RADIOLOGY & ADDITIONAL TESTS:    Imaging Personally Reviewed:  [ ] YES  [ ] NO    Consultant(s) Notes Reviewed:  [ ] YES  [ ] NO    Care Discussed with Consultants/Other Providers [ ] YES  [ ] NO

## 2022-09-28 NOTE — PROGRESS NOTE ADULT - ASSESSMENT
REVIEW OF SYMPTOMS      Able to give ROS  Yes     RELIABLE +/-   CONSTITUTIONAL Weakness Yes  Chills No   ENDOCRINE  No heat or cold intolerance    ALLERGY No hives  Sore throat No stridor  RESP Coughing blood no  Shortness of breath YES   NEURO No Headache  Confusion Pain neck No   CARDIAC No Chest pain No Palpitations   GI  Pain abdomen NO   Vomiting NO     NOTABLE FINDINGS    PHYSICAL EXAM    HEENT Unremarkable  atraumatic   RESP Fair air entry EXP prolonged    Harsh breath sound Resp distres mild   CARDIAC S1 S2 No S3     NO JVD    ABDOMEN SOFT BS PRESENT NOT DISTENDED No hepatosplenomegaly PEDAL EDEMA present No calf tenderness  NO rash       AGE/SEX.   52 f  DOA.  9/22/2022   CC .  9/22/2022 from [facility] with sob fluid retentn incr o2 requiremnt     RECENT HOSPITAL STAY.  7/10-7/23 [facility] Fever cough sob  Rxed mf pneum needed intubatn ards then bpap   extubatd 7/17 needed proni     HOSPITAL COURSE.   Fever 9/23/2022  COPD  Obesity   RO DVR   RO OHS   RO HFPEF    PMH .  pmh Former smoker smoker 45 y quit 2013   pmh marijuana smoker   pmh DM2  pmh Hytn   pmh IBS   pmh CRPS (Complex regional pain syndrome)  pmh spinal nerve stimulatoir 7/8/2022   pms pseudotumor cerebri        GENERAL DATA .   GOC.   9/22/2022 full code     ALLGY.       amitriptiline                       WT.   9/22/2022 156        BMI.    9/22/2022 52                           ICU STAY.  none  COVID.  9/24/2022 scv2 (-)     BEST PRACTICE ISSUES.    HOB ELEVATN. Yes  DVT PPLX. 9/22/2022 hpsc 7500.2      EWING PPLX.   9/22/2022 protonix 40    INFN PPLX.    SP SW DAWIT.   7/19 FEES      DIET.  9/22/2022 cons carb               VS/ PO/IO/ VENT/ DRIPS.  9/28/2022 afeb 65 100/60   9/28/2022 2l 95%     ASSESSMENT/RECOMMENDATIONS .   RESP.  SHORTNESS OF BREATH.  Shortness of breath is likely multifactorial.  She had recent ards sec aspn pneum  She has morbid obesity  bmi 52  She may have OHS   She will need further eval as outpt including pfts and sleep study    COPD.  9/23/2022 spiriva   cont rx    DVT pplx   9/22/2022 Is on hpsc    RESP FAILURE.  9/24/2022 5a 12/5/.6 743/49/147  9/22/2022 CO2 is 27    Target po 90-95% pH 730 (+)  IS ON NOCT BPAP for  STAS   will need abg close to discharge to determine need for home nppv     RO PE.  D-dimr 9/22/2022 dd 461  ct ch 9/23 no pe  v duplx 9/23 (-)   no vte     INFECTION.  W 9/22-9/23-9/24/2022 w 14- 10.0 - 9.9   ct ch 9/23 no pe ggo and patchy airspace opac atelectasis bl  and tr pl effs  flu ab 9/22/2022 (-)   rsv 9/22/2022 (-)   rvp 9/24 (-)   CT ch findings likely resolving ards which she  had 7/10-7/23 admission [facility]     CARDIAC.  CAD.  tr 9/22/2022 tr 15  mi ruld out    HYTN.  9/22 nifedipine 60    bp controlld     HFPEF.  echo 7/13/2022 hyperdynamic lvsf n rvsf n  bnp 9/22/2022 bnp 87   9/22/2022 lisinopril 10   9/25 spironolact 25   9/25/2022 bumetanide 2.2   Cardio felt that low bnp may be false negv sec obsity and female sex and prior tte may not be indicative of current status   Good response to diuretics makes lvot obstrn less likely    To repeat tte when more euvolemic and able to lay flat    GI.  no active issues    HEMAT.  Hb 9/22-9/24/2022 Hb 10 - 9.9  monitor     RENAL.  Na 9/22-9/28/2022 Na 145 - 137  Cr 9/22-9/27/2022 Cr .8 - .9   moniytor    TIME SPENT   Over 25 minutes aggregate care time spent on encounter; activities included   direct patient care, counseling and/or coordinating care reviewing notes, lab data/ imaging , discussion with multidisciplinary team/ patient  /family and explaining in detail risks, benefits, alternatives  of the recommendations     MARIBEL KELLY 52 f 1970 9/22/2022 DR JUVENCIO COKER

## 2022-09-28 NOTE — PROGRESS NOTE ADULT - ASSESSMENT
Patient is a 52F with a PMH of chronic pain syndrome (spinal nerve stimulator placed 07/2022, morbid obesity, DM, HTN, CAD, IBS, pseudotumor cerebri who presents to the ED for dyspnea.  Patient states that over the last 3 weeks she has had increased swelling in her lower extremities (worse on the R side) and has also developed dyspnea and orthopnea.  Patient reportedly requiring increased oxygen concentrations at the NH - as high as 10L/min?  Patient concerned as she was started on lasix at the NH however her urination has not increased.  Patient also reports 15 lb weight gain in the last two weeks.  Presented to ED for evaluation.

## 2022-09-28 NOTE — PROGRESS NOTE ADULT - SUBJECTIVE AND OBJECTIVE BOX
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;intercostal neuralgia&quot;)      Cardiac abnormality  (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;cardiac event&quot;; is unclear on diagnosis; denies hx/o MI)      Diabetes      Essential hypertension      IBS (irritable bowel syndrome)      Complex regional pain syndrome type 1      History of cholecystectomy      History of hand surgery  (pt states she had 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12)          FAMILY HISTORY:  FH: HTN (hypertension)        Home Medications:  benzonatate 100 mg oral capsule: 1 cap(s) orally 3 times a day (22 Jul 2022 16:48)  furosemide 40 mg oral tablet: 1 tab(s) orally once a day (22 Jul 2022 16:48)  guaiFENesin 1200 mg oral tablet, extended release: 1 tab(s) orally every 12 hours (22 Jul 2022 16:48)  lisinopril 10 mg oral tablet: 1 tab(s) orally once a day (22 Jul 2022 16:48)  mupirocin 2% topical ointment: 1 application topically 2 times a day (22 Jul 2022 16:48)  naloxegol 25 mg oral tablet: 1 tab(s) orally once a day (22 Jul 2022 16:48)  NIFEdipine 60 mg oral tablet, extended release: 1 tab(s) orally once a day (22 Jul 2022 16:48)  NovoLOG 100 units/mL injectable solution: 10 unit(s) subcutaneous 3 times a day (with meals) (11 Jul 2022 01:34)  oxyCODONE 15 mg oral tablet: 1 tab(s) orally every 4 hours, As needed, Severe Pain (7 - 10) (22 Jul 2022 16:48)  pantoprazole 40 mg oral delayed release tablet: 1 tab(s) orally once a day (before a meal) (22 Jul 2022 16:48)  pregabalin 100 mg oral capsule: 1 cap(s) orally every 8 hours (22 Jul 2022 16:48)  sertraline 50 mg oral tablet: 1 tab(s) orally once a day (22 Jul 2022 16:48)  Tresiba 100 units/mL subcutaneous solution: 80 unit(s) subcutaneous every 72 hours (11 Jul 2022 01:22)      MEDICATIONS  (STANDING):  buMETAnide Injectable 2 milliGRAM(s) IV Push two times a day  dextrose 5%. 1000 milliLiter(s) (50 mL/Hr) IV Continuous <Continuous>  dextrose 5%. 1000 milliLiter(s) (100 mL/Hr) IV Continuous <Continuous>  dextrose 50% Injectable 25 Gram(s) IV Push once  dextrose 50% Injectable 12.5 Gram(s) IV Push once  dextrose 50% Injectable 25 Gram(s) IV Push once  glucagon  Injectable 1 milliGRAM(s) IntraMuscular once  heparin   Injectable 7500 Unit(s) SubCutaneous every 12 hours  influenza   Vaccine 0.5 milliLiter(s) IntraMuscular once  insulin glargine Injectable (LANTUS) 20 Unit(s) SubCutaneous at bedtime  insulin lispro (ADMELOG) corrective regimen sliding scale   SubCutaneous at bedtime  insulin lispro (ADMELOG) corrective regimen sliding scale   SubCutaneous three times a day before meals  insulin lispro Injectable (ADMELOG) 4 Unit(s) SubCutaneous three times a day before meals  lisinopril 10 milliGRAM(s) Oral daily  NIFEdipine XL 60 milliGRAM(s) Oral daily  pantoprazole    Tablet 40 milliGRAM(s) Oral before breakfast  pregabalin 100 milliGRAM(s) Oral every 8 hours  sertraline 50 milliGRAM(s) Oral daily  spironolactone 25 milliGRAM(s) Oral daily  tiotropium 18 MICROgram(s) Capsule 1 Capsule(s) Inhalation daily    MEDICATIONS  (PRN):  baclofen 5 milliGRAM(s) Oral every 8 hours PRN Muscle Spasm  dextrose Oral Gel 15 Gram(s) Oral once PRN Blood Glucose LESS THAN 70 milliGRAM(s)/deciliter  melatonin 3 milliGRAM(s) Oral at bedtime PRN Sleep  ondansetron Injectable 4 milliGRAM(s) IV Push every 8 hours PRN Nausea and/or Vomiting  oxyCODONE    IR 30 milliGRAM(s) Oral every 4 hours PRN Severe Pain (7 - 10)  oxyCODONE    IR 15 milliGRAM(s) Oral every 4 hours PRN Moderate Pain (4 - 6)  simethicone 80 milliGRAM(s) Chew three times a day PRN Gas      Diet, Consistent Carbohydrate w/Evening Snack:   1200mL Fluid Restriction ([id number])  Low Sodium  Liquid Protein Supplement     Qty per Day:  1 (09-27-22 @ 12:07) [Active]          Vital Signs Last 24 Hrs  T(C): 36.7 (28 Sep 2022 04:54), Max: 36.8 (27 Sep 2022 15:55)  T(F): 98 (28 Sep 2022 04:54), Max: 98.3 (27 Sep 2022 23:53)  HR: 66 (28 Sep 2022 05:20) (60 - 83)  BP: 137/80 (28 Sep 2022 04:54) (122/89 - 157/78)  BP(mean): --  RR: 18 (28 Sep 2022 04:54) (16 - 20)  SpO2: 97% (28 Sep 2022 05:20) (85% - 99%)    Parameters below as of 28 Sep 2022 04:54  Patient On (Oxygen Delivery Method): BiPAP/CPAP          09-26-22 @ 07:01  -  09-27-22 @ 07:00  --------------------------------------------------------  IN: 900 mL / OUT: 6350 mL / NET: -5450 mL    09-27-22 @ 07:01  -  09-28-22 @ 06:36  --------------------------------------------------------  IN: 968 mL / OUT: 4600 mL / NET: -3632 mL              LABS:                        9.9    8.54  )-----------( 419      ( 26 Sep 2022 08:13 )             32.2     09-27    137  |  99  |  14  ----------------------------<  319<H>  3.5   |  32<H>  |  0.90    Ca    8.4<L>      27 Sep 2022 06:21  Mg     1.5     09-27                WBC:  WBC Count: 8.54 K/uL (09-26 @ 08:13)      MICROBIOLOGY:  RECENT CULTURES:  09-23 .Blood Blood XXXX XXXX   No growth to date.                    Sodium:  Sodium, Serum: 137 mmol/L (09-27 @ 06:21)  Sodium, Serum: 139 mmol/L (09-26 @ 08:13)      0.90 mg/dL 09-27 @ 06:21  0.83 mg/dL 09-26 @ 08:13      Hemoglobin:  Hemoglobin: 9.9 g/dL (09-26 @ 08:13)      Platelets: Platelet Count - Automated: 419 K/uL (09-26 @ 08:13)              RADIOLOGY & ADDITIONAL STUDIES:      MICROBIOLOGY:  RECENT CULTURES:  09-23 .Blood Blood XXXX XXXX   No growth to date.

## 2022-09-28 NOTE — PROGRESS NOTE ADULT - ASSESSMENT
52F HTN, DM, CAD, chronic pain s/p spinal nerve stimulator, IBS, pseudotumor cerebri with three weeks of worsening LE edema, dyspnea, and orthopnea with weight gain.    Has half sister with CHF dx age 50  Recent TTE was wnl except for mild pHTN  No PE on CTA    ·	Acute CHF with significant volume overload ->clinically improving with adequate weight loss  low BNP likely false negative in setting of obesity and female sex    -cont IV Bumex 2mg BID  -cont spironolactone 25mg daily  -monitor renal function and electrolytes   -repeat TTE when more euvolemic and able to lay flat  -cont lisinopril  -eventual bblocker and optimization of her GDMT  -supplemental O2 as needed   -nutrition eval appreciated, fluid restriction encouraged   -activity as tolerated    52F HTN, DM, CAD, chronic pain s/p spinal nerve stimulator, IBS, pseudotumor cerebri with three weeks of worsening LE edema, dyspnea, and orthopnea with weight gain.    Has half sister with CHF dx age 50  Recent TTE was wnl except for mild pHTN  No PE on CTA    ·	Acute CHF with significant volume overload ->clinically improving with adequate weight loss  low BNP likely false negative in setting of obesity and female sex    -cont IV Bumex 2mg BID  -cont spironolactone 25mg daily  -monitor renal function and electrolytes   -TTE result pending   -cont lisinopril  -eventual bblocker and optimization of her GDMT  -supplemental O2 as needed   -nutrition eval appreciated, fluid restriction encouraged   -activity as tolerated

## 2022-09-29 LAB
ALBUMIN SERPL ELPH-MCNC: 2.9 G/DL — LOW (ref 3.3–5)
ALP SERPL-CCNC: 89 U/L — SIGNIFICANT CHANGE UP (ref 40–120)
ALT FLD-CCNC: 30 U/L — SIGNIFICANT CHANGE UP (ref 12–78)
ANION GAP SERPL CALC-SCNC: 6 MMOL/L — SIGNIFICANT CHANGE UP (ref 5–17)
AST SERPL-CCNC: 22 U/L — SIGNIFICANT CHANGE UP (ref 15–37)
BILIRUB SERPL-MCNC: 0.4 MG/DL — SIGNIFICANT CHANGE UP (ref 0.2–1.2)
BUN SERPL-MCNC: 15 MG/DL — SIGNIFICANT CHANGE UP (ref 7–23)
CALCIUM SERPL-MCNC: 8.6 MG/DL — SIGNIFICANT CHANGE UP (ref 8.5–10.1)
CHLORIDE SERPL-SCNC: 100 MMOL/L — SIGNIFICANT CHANGE UP (ref 96–108)
CO2 SERPL-SCNC: 32 MMOL/L — HIGH (ref 22–31)
CREAT SERPL-MCNC: 1.03 MG/DL — SIGNIFICANT CHANGE UP (ref 0.5–1.3)
EGFR: 65 ML/MIN/1.73M2 — SIGNIFICANT CHANGE UP
GLUCOSE BLDC GLUCOMTR-MCNC: 274 MG/DL — HIGH (ref 70–99)
GLUCOSE BLDC GLUCOMTR-MCNC: 307 MG/DL — HIGH (ref 70–99)
GLUCOSE BLDC GLUCOMTR-MCNC: 379 MG/DL — HIGH (ref 70–99)
GLUCOSE BLDC GLUCOMTR-MCNC: 448 MG/DL — HIGH (ref 70–99)
GLUCOSE SERPL-MCNC: 293 MG/DL — HIGH (ref 70–99)
HCT VFR BLD CALC: 33.1 % — LOW (ref 34.5–45)
HGB BLD-MCNC: 10.1 G/DL — LOW (ref 11.5–15.5)
MAGNESIUM SERPL-MCNC: 1.6 MG/DL — SIGNIFICANT CHANGE UP (ref 1.6–2.6)
MCHC RBC-ENTMCNC: 27.7 PG — SIGNIFICANT CHANGE UP (ref 27–34)
MCHC RBC-ENTMCNC: 30.5 G/DL — LOW (ref 32–36)
MCV RBC AUTO: 90.9 FL — SIGNIFICANT CHANGE UP (ref 80–100)
NRBC # BLD: 0 /100 WBCS — SIGNIFICANT CHANGE UP (ref 0–0)
PLATELET # BLD AUTO: 404 K/UL — HIGH (ref 150–400)
POTASSIUM SERPL-MCNC: 3.7 MMOL/L — SIGNIFICANT CHANGE UP (ref 3.5–5.3)
POTASSIUM SERPL-SCNC: 3.7 MMOL/L — SIGNIFICANT CHANGE UP (ref 3.5–5.3)
PROT SERPL-MCNC: 7.4 GM/DL — SIGNIFICANT CHANGE UP (ref 6–8.3)
RBC # BLD: 3.64 M/UL — LOW (ref 3.8–5.2)
RBC # FLD: 14.2 % — SIGNIFICANT CHANGE UP (ref 10.3–14.5)
SODIUM SERPL-SCNC: 138 MMOL/L — SIGNIFICANT CHANGE UP (ref 135–145)
WBC # BLD: 9.22 K/UL — SIGNIFICANT CHANGE UP (ref 3.8–10.5)
WBC # FLD AUTO: 9.22 K/UL — SIGNIFICANT CHANGE UP (ref 3.8–10.5)

## 2022-09-29 PROCEDURE — 99232 SBSQ HOSP IP/OBS MODERATE 35: CPT

## 2022-09-29 PROCEDURE — 99233 SBSQ HOSP IP/OBS HIGH 50: CPT | Mod: FS

## 2022-09-29 PROCEDURE — 99233 SBSQ HOSP IP/OBS HIGH 50: CPT

## 2022-09-29 RX ADMIN — Medication 5: at 16:21

## 2022-09-29 RX ADMIN — BUMETANIDE 2 MILLIGRAM(S): 0.25 INJECTION INTRAMUSCULAR; INTRAVENOUS at 06:20

## 2022-09-29 RX ADMIN — BUMETANIDE 2 MILLIGRAM(S): 0.25 INJECTION INTRAMUSCULAR; INTRAVENOUS at 13:05

## 2022-09-29 RX ADMIN — OXYCODONE HYDROCHLORIDE 30 MILLIGRAM(S): 5 TABLET ORAL at 20:46

## 2022-09-29 RX ADMIN — Medication 3: at 08:11

## 2022-09-29 RX ADMIN — Medication 100 MILLIGRAM(S): at 13:05

## 2022-09-29 RX ADMIN — PANTOPRAZOLE SODIUM 40 MILLIGRAM(S): 20 TABLET, DELAYED RELEASE ORAL at 06:21

## 2022-09-29 RX ADMIN — OXYCODONE HYDROCHLORIDE 15 MILLIGRAM(S): 5 TABLET ORAL at 00:04

## 2022-09-29 RX ADMIN — Medication 4 UNIT(S): at 08:12

## 2022-09-29 RX ADMIN — OXYCODONE HYDROCHLORIDE 15 MILLIGRAM(S): 5 TABLET ORAL at 01:40

## 2022-09-29 RX ADMIN — SENNA PLUS 2 TABLET(S): 8.6 TABLET ORAL at 21:26

## 2022-09-29 RX ADMIN — OXYCODONE HYDROCHLORIDE 15 MILLIGRAM(S): 5 TABLET ORAL at 14:08

## 2022-09-29 RX ADMIN — Medication 8: at 21:27

## 2022-09-29 RX ADMIN — SPIRONOLACTONE 25 MILLIGRAM(S): 25 TABLET, FILM COATED ORAL at 06:22

## 2022-09-29 RX ADMIN — Medication 4 UNIT(S): at 11:29

## 2022-09-29 RX ADMIN — Medication 3 MILLIGRAM(S): at 00:04

## 2022-09-29 RX ADMIN — Medication 4 UNIT(S): at 16:21

## 2022-09-29 RX ADMIN — OXYCODONE HYDROCHLORIDE 15 MILLIGRAM(S): 5 TABLET ORAL at 13:17

## 2022-09-29 RX ADMIN — LISINOPRIL 10 MILLIGRAM(S): 2.5 TABLET ORAL at 06:21

## 2022-09-29 RX ADMIN — OXYCODONE HYDROCHLORIDE 30 MILLIGRAM(S): 5 TABLET ORAL at 19:46

## 2022-09-29 RX ADMIN — HEPARIN SODIUM 7500 UNIT(S): 5000 INJECTION INTRAVENOUS; SUBCUTANEOUS at 17:05

## 2022-09-29 RX ADMIN — HEPARIN SODIUM 7500 UNIT(S): 5000 INJECTION INTRAVENOUS; SUBCUTANEOUS at 06:21

## 2022-09-29 RX ADMIN — Medication 4: at 11:28

## 2022-09-29 RX ADMIN — TIOTROPIUM BROMIDE 1 CAPSULE(S): 18 CAPSULE ORAL; RESPIRATORY (INHALATION) at 11:30

## 2022-09-29 RX ADMIN — SERTRALINE 50 MILLIGRAM(S): 25 TABLET, FILM COATED ORAL at 11:30

## 2022-09-29 RX ADMIN — Medication 100 MILLIGRAM(S): at 21:26

## 2022-09-29 RX ADMIN — Medication 100 MILLIGRAM(S): at 06:21

## 2022-09-29 RX ADMIN — INSULIN GLARGINE 20 UNIT(S): 100 INJECTION, SOLUTION SUBCUTANEOUS at 21:29

## 2022-09-29 NOTE — CHART NOTE - NSCHARTNOTEFT_GEN_A_CORE
Per chart pt with PMH: chronic pain syndrome (spinal nerve stimulator placed 07/2022), T2DM, HTN, CAD, IBS, pseudotumor cerebri who presents for dyspnea and leg swelling, found with acute respiratory failure with hypoxia and Acute CHF with significant volume overload.    Factors impacting intake: [x] none [ ] nausea  [ ] vomiting [ ] diarrhea [ ] constipation  [ ]chewing problems [ ] swallowing issues  [ ] other:     Diet Prescription: Diet, Consistent Carbohydrate w/Evening Snack:   1200mL Fluid Restriction ([id number])  Low Sodium  Liquid Protein Supplement     Qty per Day:  1 (09-27-22 @ 12:07)    Intake: % as per flow sheets    Current Weight: (09/29) 160.2kg (09/22) 156kg indicating weight gain of 4.2kg  % Weight Change: 3%    Edema: 2+ right and left legs as per flow sheets    Physical Appearance:     Pertinent Medications: MEDICATIONS  (STANDING):  buMETAnide Injectable 2 milliGRAM(s) IV Push two times a day  dextrose 5%. 1000 milliLiter(s) (100 mL/Hr) IV Continuous <Continuous>  dextrose 5%. 1000 milliLiter(s) (50 mL/Hr) IV Continuous <Continuous>  dextrose 50% Injectable 25 Gram(s) IV Push once  dextrose 50% Injectable 12.5 Gram(s) IV Push once  dextrose 50% Injectable 25 Gram(s) IV Push once  glucagon  Injectable 1 milliGRAM(s) IntraMuscular once  heparin   Injectable 7500 Unit(s) SubCutaneous every 12 hours  influenza   Vaccine 0.5 milliLiter(s) IntraMuscular once  insulin glargine Injectable (LANTUS) 20 Unit(s) SubCutaneous at bedtime  insulin lispro (ADMELOG) corrective regimen sliding scale   SubCutaneous three times a day before meals  insulin lispro (ADMELOG) corrective regimen sliding scale   SubCutaneous at bedtime  insulin lispro Injectable (ADMELOG) 4 Unit(s) SubCutaneous three times a day before meals  lisinopril 10 milliGRAM(s) Oral daily  NIFEdipine XL 60 milliGRAM(s) Oral daily  pantoprazole    Tablet 40 milliGRAM(s) Oral before breakfast  pregabalin 100 milliGRAM(s) Oral every 8 hours  senna 2 Tablet(s) Oral at bedtime  sertraline 50 milliGRAM(s) Oral daily  spironolactone 25 milliGRAM(s) Oral daily  tiotropium 18 MICROgram(s) Capsule 1 Capsule(s) Inhalation daily    MEDICATIONS  (PRN):  baclofen 5 milliGRAM(s) Oral every 8 hours PRN Muscle Spasm  dextrose Oral Gel 15 Gram(s) Oral once PRN Blood Glucose LESS THAN 70 milliGRAM(s)/deciliter  melatonin 3 milliGRAM(s) Oral at bedtime PRN Sleep  ondansetron Injectable 4 milliGRAM(s) IV Push every 8 hours PRN Nausea and/or Vomiting  oxyCODONE    IR 30 milliGRAM(s) Oral every 4 hours PRN Severe Pain (7 - 10)  oxyCODONE    IR 15 milliGRAM(s) Oral every 4 hours PRN Moderate Pain (4 - 6)  simethicone 80 milliGRAM(s) Chew three times a day PRN Gas    Pertinent Labs: 09-29 Na138 mmol/L Glu 293 mg/dL<H> K+ 3.7 mmol/L Cr  1.03 mg/dL BUN 15 mg/dL 09-24 Phos 4.3 mg/dL 09-29 Alb 2.9 g/dL<L> 09-27 Chol 148 mg/dL LDL --    HDL 50 mg/dL<L> Trig 126 mg/dL    09-23-22 @ 07:45  A1C 7.0    Skin: pressure injuries as per flow sheets: 1. sacrum- unstageable  2. right foot- healed    Estimated Needs:   [x] no change since previous assessment: 09/23  [ ] recalculated:     Previous Nutrition Diagnosis:   [x] Increased Nutrient Needs	Protein  Etiology	Increased Demand for Nutrient  Signs/Symptoms	Sacral spine - unstageable  Goal/Expected Outcome	Pt will Meet increased protein needs > 75% via meals / supplements (met)      Nutrition Diagnosis is [x] ongoing  [ ] resolved [ ] not applicable     New Nutrition Diagnosis: [x] not applicable      Interventions:   Recommend  [ ] Change Diet To:  [ ] Nutrition Supplement  [ ] Nutrition Support  [ ] Other:     Monitoring and Evaluation:   [ ] PO intake [ x ] Tolerance to diet prescription [ x ] weights [ x ] labs[ x ] follow up per protocol  [ ] other: Per chart pt with PMH: chronic pain syndrome (spinal nerve stimulator placed 07/2022), T2DM, HTN, CAD, IBS, pseudotumor cerebri who presents for dyspnea and leg swelling, found with acute respiratory failure with hypoxia and Acute CHF with significant volume overload. Pt given Heart Failure nutrition therapy education. Pt verbalized understanding. Made aware RD remains available.     Factors impacting intake: [x] none [ ] nausea  [ ] vomiting [ ] diarrhea [ ] constipation  [ ]chewing problems [ ] swallowing issues  [ ] other:     Diet Prescription: Diet, Consistent Carbohydrate w/Evening Snack:   1200mL Fluid Restriction ([id number])  Low Sodium  Liquid Protein Supplement     Qty per Day:  1 (09-27-22 @ 12:07)    Intake: % as per flow sheets; pt reports consuming LPS x 1 per day (provides 15g protein)    Current Weight: (09/29) 160.2kg (09/22) 156kg indicating weight gain of 4.2kg  % Weight Change: 3% likely due to fluid shifts    Edema: 2+ right and left legs as per flow sheets    Physical Appearance: no signs of muscle wasting or fat loss    Pertinent Medications: MEDICATIONS  (STANDING):  buMETAnide Injectable 2 milliGRAM(s) IV Push two times a day  dextrose 5%. 1000 milliLiter(s) (100 mL/Hr) IV Continuous <Continuous>  dextrose 5%. 1000 milliLiter(s) (50 mL/Hr) IV Continuous <Continuous>  dextrose 50% Injectable 25 Gram(s) IV Push once  dextrose 50% Injectable 12.5 Gram(s) IV Push once  dextrose 50% Injectable 25 Gram(s) IV Push once  glucagon  Injectable 1 milliGRAM(s) IntraMuscular once  heparin   Injectable 7500 Unit(s) SubCutaneous every 12 hours  influenza   Vaccine 0.5 milliLiter(s) IntraMuscular once  insulin glargine Injectable (LANTUS) 20 Unit(s) SubCutaneous at bedtime  insulin lispro (ADMELOG) corrective regimen sliding scale   SubCutaneous three times a day before meals  insulin lispro (ADMELOG) corrective regimen sliding scale   SubCutaneous at bedtime  insulin lispro Injectable (ADMELOG) 4 Unit(s) SubCutaneous three times a day before meals  lisinopril 10 milliGRAM(s) Oral daily  NIFEdipine XL 60 milliGRAM(s) Oral daily  pantoprazole    Tablet 40 milliGRAM(s) Oral before breakfast  pregabalin 100 milliGRAM(s) Oral every 8 hours  senna 2 Tablet(s) Oral at bedtime  sertraline 50 milliGRAM(s) Oral daily  spironolactone 25 milliGRAM(s) Oral daily  tiotropium 18 MICROgram(s) Capsule 1 Capsule(s) Inhalation daily    MEDICATIONS  (PRN):  baclofen 5 milliGRAM(s) Oral every 8 hours PRN Muscle Spasm  dextrose Oral Gel 15 Gram(s) Oral once PRN Blood Glucose LESS THAN 70 milliGRAM(s)/deciliter  melatonin 3 milliGRAM(s) Oral at bedtime PRN Sleep  ondansetron Injectable 4 milliGRAM(s) IV Push every 8 hours PRN Nausea and/or Vomiting  oxyCODONE    IR 30 milliGRAM(s) Oral every 4 hours PRN Severe Pain (7 - 10)  oxyCODONE    IR 15 milliGRAM(s) Oral every 4 hours PRN Moderate Pain (4 - 6)  simethicone 80 milliGRAM(s) Chew three times a day PRN Gas    Pertinent Labs: 09-29 Na138 mmol/L Glu 293 mg/dL<H> K+ 3.7 mmol/L Cr  1.03 mg/dL BUN 15 mg/dL 09-24 Phos 4.3 mg/dL 09-29 Alb 2.9 g/dL<L> 09-27 Chol 148 mg/dL LDL --    HDL 50 mg/dL<L> Trig 126 mg/dL    09-23-22 @ 07:45  A1C 7.0    Skin: pressure injuries as per flow sheets: 1. sacrum- unstageable  2. right foot- healed    Estimated Needs:   [x] no change since previous assessment: 09/23  [ ] recalculated:     Previous Nutrition Diagnosis:   [x] Increased Nutrient Needs	Protein  Etiology	Increased Demand for Nutrient  Signs/Symptoms	Sacral spine - unstageable  Goal/Expected Outcome	Pt will Meet increased protein needs > 75% via meals / supplements (met)      Nutrition Diagnosis is [x] ongoing  [ ] resolved [ ] not applicable     New Nutrition Diagnosis: [x] not applicable      Interventions:   Recommend  [x] Continue current diet as ordered  [ ] Change Diet To:  [ ] Nutrition Supplement  [ ] Nutrition Support  [ ] Other:     Monitoring and Evaluation:   [ ] PO intake [ x ] Tolerance to diet prescription [ x ] weights [ x ] labs[ x ] follow up per protocol  [ ] other:

## 2022-09-29 NOTE — PROGRESS NOTE ADULT - SUBJECTIVE AND OBJECTIVE BOX
Patient is a 52y old  Female who presents with a chief complaint of dyspnea, orthopnea (29 Sep 2022 14:12)    INTERVAL HPI/OVERNIGHT EVENTS:  Pt was seen and examined, no acute events.    MEDICATIONS  (STANDING):  buMETAnide Injectable 2 milliGRAM(s) IV Push two times a day  dextrose 5%. 1000 milliLiter(s) (50 mL/Hr) IV Continuous <Continuous>  dextrose 5%. 1000 milliLiter(s) (100 mL/Hr) IV Continuous <Continuous>  dextrose 50% Injectable 25 Gram(s) IV Push once  dextrose 50% Injectable 12.5 Gram(s) IV Push once  dextrose 50% Injectable 25 Gram(s) IV Push once  glucagon  Injectable 1 milliGRAM(s) IntraMuscular once  heparin   Injectable 7500 Unit(s) SubCutaneous every 12 hours  influenza   Vaccine 0.5 milliLiter(s) IntraMuscular once  insulin glargine Injectable (LANTUS) 20 Unit(s) SubCutaneous at bedtime  insulin lispro (ADMELOG) corrective regimen sliding scale   SubCutaneous three times a day before meals  insulin lispro (ADMELOG) corrective regimen sliding scale   SubCutaneous at bedtime  insulin lispro Injectable (ADMELOG) 4 Unit(s) SubCutaneous three times a day before meals  lisinopril 10 milliGRAM(s) Oral daily  NIFEdipine XL 60 milliGRAM(s) Oral daily  pantoprazole    Tablet 40 milliGRAM(s) Oral before breakfast  pregabalin 100 milliGRAM(s) Oral every 8 hours  senna 2 Tablet(s) Oral at bedtime  sertraline 50 milliGRAM(s) Oral daily  spironolactone 25 milliGRAM(s) Oral daily  tiotropium 18 MICROgram(s) Capsule 1 Capsule(s) Inhalation daily    MEDICATIONS  (PRN):  baclofen 5 milliGRAM(s) Oral every 8 hours PRN Muscle Spasm  dextrose Oral Gel 15 Gram(s) Oral once PRN Blood Glucose LESS THAN 70 milliGRAM(s)/deciliter  melatonin 3 milliGRAM(s) Oral at bedtime PRN Sleep  ondansetron Injectable 4 milliGRAM(s) IV Push every 8 hours PRN Nausea and/or Vomiting  oxyCODONE    IR 30 milliGRAM(s) Oral every 4 hours PRN Severe Pain (7 - 10)  oxyCODONE    IR 15 milliGRAM(s) Oral every 4 hours PRN Moderate Pain (4 - 6)  simethicone 80 milliGRAM(s) Chew three times a day PRN Gas      Allergies  amitriptyline (Other)      Vital Signs Last 24 Hrs  T(C): 36.6 (29 Sep 2022 16:40), Max: 37.1 (29 Sep 2022 05:47)  T(F): 97.9 (29 Sep 2022 16:40), Max: 98.8 (29 Sep 2022 05:47)  HR: 73 (29 Sep 2022 17:43) (65 - 88)  BP: 139/80 (29 Sep 2022 16:40) (110/64 - 139/80)  BP(mean): --  RR: 18 (29 Sep 2022 16:40) (18 - 18)  SpO2: 95% (29 Sep 2022 17:43) (95% - 100%)    Parameters below as of 29 Sep 2022 16:40  Patient On (Oxygen Delivery Method): nasal cannula  O2 Flow (L/min): 2      PHYSICAL EXAM:  GENERAL: NAD,    HEAD:  Atraumatic, Normocephalic  EYES: EOMI, PERRLA, conjunctiva and sclera clear  ENMT: No tonsillar erythema, exudates, or enlargement; Moist mucous membranes, Good dentition, No lesions  NECK: Supple, No JVD, Normal thyroid  NERVOUS SYSTEM:  Alert & Oriented X3, Good concentration; Motor Strength 5/5 B/L upper and lower extremities; DTRs 2+ intact and symmetric  CHEST/LUNG: Clear to percussion bilaterally; No rales, rhonchi, wheezing, or rubs  HEART: Regular rate and rhythm; No murmurs, rubs, or gallops  ABDOMEN: Soft, Nontender, Nondistended; Bowel sounds present  EXTREMITIES:  2+ edema B/L LE, improving   LYMPH: No lymphadenopathy noted  SKIN: No rashes or lesions        LABS:                        10.1   9.22  )-----------( 404      ( 29 Sep 2022 06:27 )             33.1     09-29    138  |  100  |  15  ----------------------------<  293<H>  3.7   |  32<H>  |  1.03    Ca    8.6      29 Sep 2022 06:27  Mg     1.6     09-29    TPro  7.4  /  Alb  2.9<L>  /  TBili  0.4  /  DBili  x   /  AST  22  /  ALT  30  /  AlkPhos  89  09-29        CAPILLARY BLOOD GLUCOSE      POCT Blood Glucose.: 379 mg/dL (29 Sep 2022 16:19)  POCT Blood Glucose.: 307 mg/dL (29 Sep 2022 11:21)  POCT Blood Glucose.: 274 mg/dL (29 Sep 2022 07:59)  POCT Blood Glucose.: 381 mg/dL (28 Sep 2022 21:05)      RADIOLOGY & ADDITIONAL TESTS:    Imaging Personally Reviewed:  [ ] YES  [ ] NO    Consultant(s) Notes Reviewed:  [ ] YES  [ ] NO    Care Discussed with Consultants/Other Providers [ ] YES  [ ] NO

## 2022-09-29 NOTE — PROGRESS NOTE ADULT - SUBJECTIVE AND OBJECTIVE BOX
Patient is a 52y old  Female who presents with a chief complaint of dyspnea, orthopnea (29 Sep 2022 10:11)    PAST MEDICAL & SURGICAL HISTORY:  Diabetes    HTN (hypertension)    Neuropathy    Obesity    STAS    CAD    Former cigarette smoker  (smoked x 45 years; quit ~2013)    Marijuana smoker, continuous  (states smokes 3 - 4 times per day for pain management reasons)    IBS (irritable bowel syndrome)    Complex regional pain syndrome type 1    s/p spinal nerve stimulator placed 07/2022    History of cholecystectomy    History of hand surgery    INTERVAL HISTORY: in no acute distress, feeling better and breathing better   	  MEDICATIONS:  MEDICATIONS  (STANDING):  buMETAnide Injectable 2 milliGRAM(s) IV Push two times a day  heparin   Injectable 7500 Unit(s) SubCutaneous every 12 hours  influenza   Vaccine 0.5 milliLiter(s) IntraMuscular once  insulin glargine Injectable (LANTUS) 20 Unit(s) SubCutaneous at bedtime  insulin lispro (ADMELOG) corrective regimen sliding scale   SubCutaneous three times a day before meals  insulin lispro (ADMELOG) corrective regimen sliding scale   SubCutaneous at bedtime  insulin lispro Injectable (ADMELOG) 4 Unit(s) SubCutaneous three times a day before meals  lisinopril 10 milliGRAM(s) Oral daily  NIFEdipine XL 60 milliGRAM(s) Oral daily  pantoprazole    Tablet 40 milliGRAM(s) Oral before breakfast  pregabalin 100 milliGRAM(s) Oral every 8 hours  senna 2 Tablet(s) Oral at bedtime  sertraline 50 milliGRAM(s) Oral daily  spironolactone 25 milliGRAM(s) Oral daily  tiotropium 18 MICROgram(s) Capsule 1 Capsule(s) Inhalation daily    MEDICATIONS  (PRN):  baclofen 5 milliGRAM(s) Oral every 8 hours PRN Muscle Spasm  dextrose Oral Gel 15 Gram(s) Oral once PRN Blood Glucose LESS THAN 70 milliGRAM(s)/deciliter  melatonin 3 milliGRAM(s) Oral at bedtime PRN Sleep  ondansetron Injectable 4 milliGRAM(s) IV Push every 8 hours PRN Nausea and/or Vomiting  oxyCODONE    IR 30 milliGRAM(s) Oral every 4 hours PRN Severe Pain (7 - 10)  oxyCODONE    IR 15 milliGRAM(s) Oral every 4 hours PRN Moderate Pain (4 - 6)  simethicone 80 milliGRAM(s) Chew three times a day PRN Gas    Vitals:  T(F): 98 (09-29-22 @ 10:38), Max: 98.8 (09-29-22 @ 05:47)  HR: 65 (09-29-22 @ 10:38) (65 - 80)  BP: 110/64 (09-29-22 @ 10:38) (110/64 - 162/88)  RR: 18 (09-29-22 @ 10:38) (18 - 18)  SpO2: 95% (09-29-22 @ 10:38) (93% - 100%)    09-28 @ 07:01  -  09-29 @ 07:00  --------------------------------------------------------  IN:    Oral Fluid: 600 mL  Total IN: 600 mL    OUT:    Voided (mL): 1900 mL  Total OUT: 1900 mL    Total NET: -1300 mL    09-29 @ 07:01  -  09-29 @ 14:12  --------------------------------------------------------  IN:  Total IN: 0 mL    OUT:    Voided (mL): 1400 mL  Total OUT: 1400 mL    Total NET: -1400 mL    PHYSICAL EXAM:  Neuro: Awake, responsive  CV: S1 S2 RRR  Lungs: diminished to bases   GI: Soft, BS +, ND, NT  Extremities: +LE edema    RADIOLOGY: < from: CT Angio Chest PE Protocol w/ IV Cont (09.23.22 @ 15:15) >  No evidence of central or lobar pulmonary emboli.    A combination of groundglass opacity and patchy airspace   disease/atelectasis involving the lungs bilaterally along with trace to   small pleural effusions.    < end of copied text >    DIAGNOSTIC TESTING:    [x ] Echocardiogram: < from: TTE Echo Complete w/o Contrast w/ Doppler (09.28.22 @ 12:13) >  Left Ventricle: Normal left ventricular size and wall thicknesses, with   normal systolic and diastolic function.  Left ventricular ejection fraction, by visual estimation, is 60 to 65%.  Right Ventricle: The right ventricle was not well visualized.  Left Atrium: The left atrium is normal in size.  Right Atrium: The right atrium was not well visualized.  Mitral Valve: Structurally normal mitral valve, with normal leaflet   excursion. No evidence of mitral valve regurgitation is seen.  Tricuspid Valve: The tricuspid valve is not well seen. Trivial tricuspid   regurgitation is visualized. Estimated pulmonary artery systolic pressure   is 44.2 mmHg assuming a right atrial pressure of 8 mmHg, which is   consistent with mild pulmonary hypertension.  Aortic Valve: Normal trileaflet aortic valve with normal opening. No   evidence of aortic valve regurgitation is seen.  Pulmonic Valve: The pulmonic valve was not well visualized. Trace   pulmonic valve regurgitation.  Aorta: Aortic root measured at Sinus of Valsalva is normal.      Summary:   1. Left ventricular ejection fraction, by visual estimation, is 60 to   65%.   2. Normal left ventricular size and wall thicknesses, with normal   systolic and diastolic function.   3. Normal trileaflet aortic valve with normal opening.   4. Estimated pulmonary artery systolic pressure is 44.2 mmHg assuming a   right atrial pressure of 8 mmHg, which is consistent with mild pulmonary   hypertension.    < end of copied text >    LABS:	 	    29 Sep 2022 06:27    138    |  100    |  15     ----------------------------<  293    3.7     |  32     |  1.03   27 Sep 2022 06:21    137    |  99     |  14     ----------------------------<  319    3.5     |  32     |  0.90     Ca    8.6        29 Sep 2022 06:27  Mg     1.6       29 Sep 2022 06:27    TPro  7.4    /  Alb  2.9    /  TBili  0.4    /  DBili  x      /  AST  22     /  ALT  30     /  AlkPhos  89     29 Sep 2022 06:27                        10.1   9.22  )-----------( 404      ( 29 Sep 2022 06:27 )             33.1   Lipid Profile: 09-27 @ 06:21  HDL/Total Cholesterol: --  HDL Chol:              50 mg/dL  Serum Chol:            148 mg/dL  Direct LDL:            --  Triglycerides:         126 mg/dL    TSH: Thyroid Stimulating Hormone, Serum: 3.630 uIU/mL (09-27 @ 06:21)

## 2022-09-29 NOTE — PROGRESS NOTE ADULT - ASSESSMENT
52F HTN, DM, CAD, chronic pain s/p spinal nerve stimulator, IBS, pseudotumor cerebri with three weeks of worsening LE edema, dyspnea, and orthopnea with weight gain.    Has half sister with CHF dx age 50  Recent TTE was wnl except for mild pHTN  No PE on CTA    ·	Acute HFpEF ->responding to bumex, clinically improving     -cont IV Bumex 2mg BID, can be switched to oral soon   -cont spironolactone 25mg daily  -monitor renal function and electrolytes   -TTE with preserved EF, mild pHTN  -cont lisinopril  -eventual bblocker and optimization of her GDMT  -supplemental O2 as needed, CPAP at night   -nutrition eval appreciated, fluid restriction encouraged   -activity as tolerated   -may benefit from RHC, can be done as outpatient

## 2022-09-29 NOTE — PROGRESS NOTE ADULT - ASSESSMENT
REVIEW OF SYMPTOMS      Able to give ROS  Yes     RELIABLE +/-   CONSTITUTIONAL Weakness Yes  Chills No   ENDOCRINE  No heat or cold intolerance    ALLERGY No hives  Sore throat No stridor  RESP Coughing blood no  Shortness of breath YES   NEURO No Headache  Confusion Pain neck No   CARDIAC No Chest pain No Palpitations   GI  Pain abdomen NO   Vomiting NO     NOTABLE FINDINGS    PHYSICAL EXAM    HEENT Unremarkable  atraumatic   RESP Fair air entry EXP prolonged    Harsh breath sound Resp distres mild   CARDIAC S1 S2 No S3     NO JVD    ABDOMEN SOFT BS PRESENT NOT DISTENDED No hepatosplenomegaly PEDAL EDEMA present No calf tenderness  NO rash       AGE/SEX.   52 f  DOA.  9/22/2022   CC .  9/22/2022 [facility] with [facility] fluid retentn incr o2 requiremnt     RECENT HOSPITAL STAY.  7/10-7/23 [facility] Fever cough sob  Rxed mf pneum needed intubatn ards then bpap   extubatd 7/17 needed proni     HOSPITAL COURSE.   Fever 9/23/2022  COPD  Obesity   RO DVR   RO OHS   RO HFPEF    PMH .  pmh Former smoker smoker 45 y quit 2013   pmh marijuana smoker   pmh DM2  pmh Hytn   pmh IBS   pmh CRPS (Complex regional pain syndrome)  pmh spinal nerve stimulatoir 7/8/2022   pms pseudotumor cerebri        GENERAL DATA .   GOC.   9/22/2022 full code     ALLGY.       amitriptiline                       WT.   9/22/2022 156        BMI.    9/22/2022 52                           ICU STAY.  none  COVID.  9/24/2022 scv2 (-)     BEST PRACTICE ISSUES.    HOB ELEVATN. Yes  DVT PPLX. 9/22/2022 hpsc 7500.2      EWING PPLX.   9/22/2022 protonix 40    INFN PPLX.    SP SW DAWIT.   7/19 FEES      DIET.  9/22/2022 cons carb               VS/ PO/IO/ VENT/ DRIPS.  9/29/2022 afeb 88 130/80   9/29/2022 2l 96%     ASSESSMENT/RECOMMENDATIONS .   RESP.  SHORTNESS OF BREATH.  Shortness of breath is likely multifactorial.  She had recent ards sec aspn pneum  She has morbid obesity  bmi 52  She may have OHS   She will need further eval as outpt including pfts and sleep study    COPD.  9/23/2022 spiriva   cont rx    DVT pplx   9/22/2022 Is on hpsc    RESP FAILURE.  9/24/2022 5a 12/5/.6 743/49/147  9/22/2022 CO2 is 27    Target po 90-95% pH 730 (+)  IS ON NOCT BPAP for  STAS   will need abg close to discharge to determine need for home nppv     RO PE.  D-dimr 9/22/2022 dd 461  ct ch 9/23 no pe  v duplx 9/23 (-)   no vte     INFECTION.  W 9/22-9/23-9/24/2022 w 14- 10.0 - 9.9   ct ch 9/23 no pe ggo and patchy airspace opac atelectasis bl  and tr pl effs  flu ab 9/22/2022 (-)   rsv 9/22/2022 (-)   rvp 9/24 (-)   CT ch findings likely resolving ards which she  had 7/10-7/23 admission [facility]     CARDIAC.  CAD.  tr 9/22/2022 tr 15  mi ruld out    HYTN.  9/22 nifedipine 60    bp controlld     HFPEF.  echo 7/13/2022 hyperdynamic lvsf n rvsf n  bnp 9/22/2022 bnp 87   9/22/2022 lisinopril 10   9/25 spironolact 25   9/25/2022 bumetanide 2.2   Cardio felt that low bnp may be false negv sec obsity and female sex and prior tte may not be indicative of current status   Good response to diuretics makes lvot obstrn less likely    To repeat tte when more euvolemic and able to lay flat    GI.  no active issues    HEMAT.  Hb 9/22-9/24/2022 Hb 10 - 9.9  monitor     RENAL.  Na 9/22-9/28/2022 Na 145 - 137  Cr 9/22-9/27/2022 Cr .8 - .9   moniytor    TIME SPENT   Over 25 minutes aggregate care time spent on encounter; activities included   direct patient care, counseling and/or coordinating care reviewing notes, lab data/ imaging , discussion with multidisciplinary team/ patient  /family and explaining in detail risks, benefits, alternatives  of the recommendations     MARIBEL KELLY 52 f 1970 9/22/2022 DR JUVENCIO GALVIN

## 2022-09-30 LAB
ALBUMIN SERPL ELPH-MCNC: 3.1 G/DL — LOW (ref 3.3–5)
ALP SERPL-CCNC: 92 U/L — SIGNIFICANT CHANGE UP (ref 40–120)
ALT FLD-CCNC: 27 U/L — SIGNIFICANT CHANGE UP (ref 12–78)
ANION GAP SERPL CALC-SCNC: 8 MMOL/L — SIGNIFICANT CHANGE UP (ref 5–17)
AST SERPL-CCNC: 15 U/L — SIGNIFICANT CHANGE UP (ref 15–37)
BILIRUB SERPL-MCNC: 0.4 MG/DL — SIGNIFICANT CHANGE UP (ref 0.2–1.2)
BUN SERPL-MCNC: 18 MG/DL — SIGNIFICANT CHANGE UP (ref 7–23)
CALCIUM SERPL-MCNC: 9 MG/DL — SIGNIFICANT CHANGE UP (ref 8.5–10.1)
CHLORIDE SERPL-SCNC: 96 MMOL/L — SIGNIFICANT CHANGE UP (ref 96–108)
CO2 SERPL-SCNC: 33 MMOL/L — HIGH (ref 22–31)
CREAT SERPL-MCNC: 1.13 MG/DL — SIGNIFICANT CHANGE UP (ref 0.5–1.3)
EGFR: 59 ML/MIN/1.73M2 — LOW
GLUCOSE BLDC GLUCOMTR-MCNC: 336 MG/DL — HIGH (ref 70–99)
GLUCOSE BLDC GLUCOMTR-MCNC: 370 MG/DL — HIGH (ref 70–99)
GLUCOSE BLDC GLUCOMTR-MCNC: 373 MG/DL — HIGH (ref 70–99)
GLUCOSE BLDC GLUCOMTR-MCNC: 387 MG/DL — HIGH (ref 70–99)
GLUCOSE SERPL-MCNC: 380 MG/DL — HIGH (ref 70–99)
HCT VFR BLD CALC: 33.4 % — LOW (ref 34.5–45)
HGB BLD-MCNC: 10.4 G/DL — LOW (ref 11.5–15.5)
MCHC RBC-ENTMCNC: 27.4 PG — SIGNIFICANT CHANGE UP (ref 27–34)
MCHC RBC-ENTMCNC: 31.1 G/DL — LOW (ref 32–36)
MCV RBC AUTO: 88.1 FL — SIGNIFICANT CHANGE UP (ref 80–100)
NRBC # BLD: 0 /100 WBCS — SIGNIFICANT CHANGE UP (ref 0–0)
PLATELET # BLD AUTO: 447 K/UL — HIGH (ref 150–400)
POTASSIUM SERPL-MCNC: 3.5 MMOL/L — SIGNIFICANT CHANGE UP (ref 3.5–5.3)
POTASSIUM SERPL-SCNC: 3.5 MMOL/L — SIGNIFICANT CHANGE UP (ref 3.5–5.3)
PROT SERPL-MCNC: 7.5 GM/DL — SIGNIFICANT CHANGE UP (ref 6–8.3)
RBC # BLD: 3.79 M/UL — LOW (ref 3.8–5.2)
RBC # FLD: 14.2 % — SIGNIFICANT CHANGE UP (ref 10.3–14.5)
SODIUM SERPL-SCNC: 137 MMOL/L — SIGNIFICANT CHANGE UP (ref 135–145)
WBC # BLD: 9.33 K/UL — SIGNIFICANT CHANGE UP (ref 3.8–10.5)
WBC # FLD AUTO: 9.33 K/UL — SIGNIFICANT CHANGE UP (ref 3.8–10.5)

## 2022-09-30 PROCEDURE — 99232 SBSQ HOSP IP/OBS MODERATE 35: CPT

## 2022-09-30 PROCEDURE — 99233 SBSQ HOSP IP/OBS HIGH 50: CPT | Mod: FS

## 2022-09-30 PROCEDURE — 99233 SBSQ HOSP IP/OBS HIGH 50: CPT

## 2022-09-30 RX ADMIN — BUMETANIDE 2 MILLIGRAM(S): 0.25 INJECTION INTRAMUSCULAR; INTRAVENOUS at 05:38

## 2022-09-30 RX ADMIN — HEPARIN SODIUM 7500 UNIT(S): 5000 INJECTION INTRAVENOUS; SUBCUTANEOUS at 17:00

## 2022-09-30 RX ADMIN — OXYCODONE HYDROCHLORIDE 30 MILLIGRAM(S): 5 TABLET ORAL at 12:30

## 2022-09-30 RX ADMIN — Medication 4 UNIT(S): at 08:35

## 2022-09-30 RX ADMIN — BUMETANIDE 2 MILLIGRAM(S): 0.25 INJECTION INTRAMUSCULAR; INTRAVENOUS at 14:56

## 2022-09-30 RX ADMIN — OXYCODONE HYDROCHLORIDE 30 MILLIGRAM(S): 5 TABLET ORAL at 11:53

## 2022-09-30 RX ADMIN — Medication 4 UNIT(S): at 17:01

## 2022-09-30 RX ADMIN — Medication 5: at 12:16

## 2022-09-30 RX ADMIN — OXYCODONE HYDROCHLORIDE 30 MILLIGRAM(S): 5 TABLET ORAL at 21:53

## 2022-09-30 RX ADMIN — Medication 60 MILLIGRAM(S): at 05:27

## 2022-09-30 RX ADMIN — PANTOPRAZOLE SODIUM 40 MILLIGRAM(S): 20 TABLET, DELAYED RELEASE ORAL at 05:27

## 2022-09-30 RX ADMIN — OXYCODONE HYDROCHLORIDE 15 MILLIGRAM(S): 5 TABLET ORAL at 01:16

## 2022-09-30 RX ADMIN — INSULIN GLARGINE 20 UNIT(S): 100 INJECTION, SOLUTION SUBCUTANEOUS at 21:59

## 2022-09-30 RX ADMIN — Medication 6: at 21:57

## 2022-09-30 RX ADMIN — HEPARIN SODIUM 7500 UNIT(S): 5000 INJECTION INTRAVENOUS; SUBCUTANEOUS at 05:28

## 2022-09-30 RX ADMIN — Medication 4: at 08:34

## 2022-09-30 RX ADMIN — SENNA PLUS 2 TABLET(S): 8.6 TABLET ORAL at 21:53

## 2022-09-30 RX ADMIN — LISINOPRIL 10 MILLIGRAM(S): 2.5 TABLET ORAL at 05:27

## 2022-09-30 RX ADMIN — Medication 3 MILLIGRAM(S): at 01:39

## 2022-09-30 RX ADMIN — TIOTROPIUM BROMIDE 1 CAPSULE(S): 18 CAPSULE ORAL; RESPIRATORY (INHALATION) at 12:00

## 2022-09-30 RX ADMIN — SERTRALINE 50 MILLIGRAM(S): 25 TABLET, FILM COATED ORAL at 11:56

## 2022-09-30 RX ADMIN — OXYCODONE HYDROCHLORIDE 15 MILLIGRAM(S): 5 TABLET ORAL at 02:59

## 2022-09-30 RX ADMIN — OXYCODONE HYDROCHLORIDE 30 MILLIGRAM(S): 5 TABLET ORAL at 22:53

## 2022-09-30 RX ADMIN — Medication 5: at 17:00

## 2022-09-30 RX ADMIN — Medication 4 UNIT(S): at 12:17

## 2022-09-30 RX ADMIN — SPIRONOLACTONE 25 MILLIGRAM(S): 25 TABLET, FILM COATED ORAL at 05:27

## 2022-09-30 RX ADMIN — Medication 5 MILLIGRAM(S): at 01:40

## 2022-09-30 NOTE — PROGRESS NOTE ADULT - ASSESSMENT
REVIEW OF SYMPTOMS      Able to give ROS  Yes     RELIABLE +/-   CONSTITUTIONAL Weakness Yes  Chills No   ENDOCRINE  No heat or cold intolerance    ALLERGY No hives  Sore throat No stridor  RESP Coughing blood no  Shortness of breath YES   NEURO No Headache  Confusion Pain neck No   CARDIAC No Chest pain No Palpitations   GI  Pain abdomen NO   Vomiting NO     NOTABLE FINDINGS    PHYSICAL EXAM    HEENT Unremarkable  atraumatic   RESP Fair air entry EXP prolonged    Harsh breath sound Resp distres mild   CARDIAC S1 S2 No S3     NO JVD    ABDOMEN SOFT BS PRESENT NOT DISTENDED No hepatosplenomegaly PEDAL EDEMA present No calf tenderness  NO rash       AGE/SEX.   52 f  DOA.  9/22/2022   CC .  9/22/2022 [place] [facility] with [place] fluid retentn incr o2 requiremnt     RECENT HOSPITAL STAY.  7/10-7/23 [facility] Fever cough sob  Rxed mf pneum needed intubatn ards then bpap   extubatd 7/17 needed proni     HOSPITAL COURSE.   Fever 9/23/2022  COPD  Obesity   RO DVR   RO OHS   RO HFPEF    PMH .  pmh Former smoker smoker 45 y quit 2013   pmh marijuana smoker   pmh DM2  pmh Hytn   pmh IBS   pmh CRPS (Complex regional pain syndrome)  pmh spinal nerve stimulatoir 7/8/2022   pms pseudotumor cerebri          GENERAL DATA .   GOC.   9/22/2022 full code     ALLGY.       amitriptiline                       WT.   9/22/2022 156        BMI.    9/22/2022 52                           ICU STAY.  none  COVID.  9/24/2022 scv2 (-)     BEST PRACTICE ISSUES.    HOB ELEVATN. Yes  DVT PPLX. 9/22/2022 hpsc 7500.2      EWING PPLX.   9/22/2022 protonix 40    INFN PPLX.    SP SW DAWIT.   7/19 FEES      DIET.  9/22/2022 cons carb               VS/ PO/IO/ VENT/ DRIPS.  9/29/2022 afeb 88 130/80   9/29/2022 2l 96%     ASSESSMENT/RECOMMENDATIONS .   RESP.  SHORTNESS OF BREATH.  Shortness of breath is likely multifactorial.  She had recent ards sec aspn pneum  She has morbid obesity  bmi 52  She may have OHS   She will need further eval as outpt including pfts and sleep study    COPD.  9/23/2022 spiriva   cont rx    DVT pplx   9/22/2022 Is on hpsc    RESP FAILURE.  9/24/2022 5a 12/5/.6 743/49/147  9/22/2022 CO2 is 27    Target po 90-95% pH 730 (+)  IS ON NOCT BPAP for  STAS   will need abg close to discharge to determine need for home nppv     RO PE.  D-dimr 9/22/2022 dd 461  ct ch 9/23 no pe  v duplx 9/23 (-)   no vte     PULM HYTN   9/30/2022 echo pasp 44 n l vs and d f  9/30/2022 ph may nbe sec ohs and stas   Needes outpt [place] of ph including rhc to see if she has precapillary ph    INFECTION.  W 9/22-9/23-9/24-9/30/2022 w 14- 10.0 - 9.9 - 9.3    ct ch 9/23 no pe ggo and patchy airspace opac atelectasis bl  and tr pl effs  flu ab 9/22/2022 (-)   rsv 9/22/2022 (-)   rvp 9/24 (-)   CT ch findings likely resolving ards which she  had 7/10-7/23 admission [facility]     CARDIAC.  CAD.  tr 9/22/2022 tr 15  mi ruld out    HYTN.  9/22 nifedipine 60    bp controlld     HFPEF.  echo 7/13/2022 hyperdynamic lvsf n rvsf n  echo 9/28/2022 ef 60% pasp 44  n lv syst and diast fn   bnp 9/22/2022 bnp 87   9/22/2022 lisinopril 10   9/25 spironolact 25   9/25/2022 bumetanide 2.2   Cardio felt that low bnp may be false negv sec obsity and female sex and prior tte may not be indicative of current status   Good response to diuretics makes lvot obstrn less likely    To repeat tte when more euvolemic and able to lay flat    GI.  no active issues    HEMAT.  Hb 9/22-9/24/2022 Hb 10 - 9.9  monitor     RENAL.  Na 9/22-9/28/2022 Na 145 - 137  Cr 9/22-9/27-9/30/2022 Cr .8 - .9 - 1.1   moniytor    TIME SPENT   Over 25 minutes aggregate care time spent on encounter; activities included   direct patient care, counseling and/or coordinating care reviewing notes, lab data/ imaging , discussion with multidisciplinary team/ patient  /family and explaining in detail risks, benefits, alternatives  of the recommendations     MARIBEL KELLY 52 f 1970 9/22/2022 DR JUVENCIO COKER

## 2022-09-30 NOTE — PROGRESS NOTE ADULT - ASSESSMENT
52F HTN, DM, CAD, chronic pain s/p spinal nerve stimulator, IBS, pseudotumor cerebri with three weeks of worsening LE edema, dyspnea, and orthopnea with weight gain.    Has half sister with CHF dx age 50  Recent TTE was wnl except for mild pHTN  No PE on CTA    ·	Acute HFpEF ->responding to bumex, clinically improving     -cont IV Bumex 2mg BID, can be switched to oral in AM  -cont spironolactone 25mg daily  -monitor renal function and electrolytes   -TTE with preserved EF, mild pHTN: repeat TTE with normal diastolic dysfunction, mildly elevated pulmonary pressures with poorly visualized RV.  -cont lisinopril  -eventual bblocker and optimization of her GDMT  -supplemental O2 as needed, CPAP at night   -nutrition eval appreciated, fluid restriction encouraged   -activity as tolerated   -clinical diagnosis remains CHF  -after discharge will recommend RHC to confirm R-sided pressures.

## 2022-09-30 NOTE — PROGRESS NOTE ADULT - SUBJECTIVE AND OBJECTIVE BOX
Patient is a 52y old  Female who presents with a chief complaint of Dyspnea, orthopnea (30 Sep 2022 15:05)      INTERVAL HPI/OVERNIGHT EVENTS:  Pt was seen and examined, no acute events.    MEDICATIONS  (STANDING):  buMETAnide Injectable 2 milliGRAM(s) IV Push two times a day  dextrose 5%. 1000 milliLiter(s) (50 mL/Hr) IV Continuous <Continuous>  dextrose 5%. 1000 milliLiter(s) (100 mL/Hr) IV Continuous <Continuous>  dextrose 50% Injectable 25 Gram(s) IV Push once  dextrose 50% Injectable 12.5 Gram(s) IV Push once  dextrose 50% Injectable 25 Gram(s) IV Push once  glucagon  Injectable 1 milliGRAM(s) IntraMuscular once  heparin   Injectable 7500 Unit(s) SubCutaneous every 12 hours  influenza   Vaccine 0.5 milliLiter(s) IntraMuscular once  insulin glargine Injectable (LANTUS) 20 Unit(s) SubCutaneous at bedtime  insulin lispro (ADMELOG) corrective regimen sliding scale   SubCutaneous at bedtime  insulin lispro (ADMELOG) corrective regimen sliding scale   SubCutaneous three times a day before meals  insulin lispro Injectable (ADMELOG) 4 Unit(s) SubCutaneous three times a day before meals  lisinopril 10 milliGRAM(s) Oral daily  NIFEdipine XL 60 milliGRAM(s) Oral daily  pantoprazole    Tablet 40 milliGRAM(s) Oral before breakfast  senna 2 Tablet(s) Oral at bedtime  sertraline 50 milliGRAM(s) Oral daily  spironolactone 25 milliGRAM(s) Oral daily  tiotropium 18 MICROgram(s) Capsule 1 Capsule(s) Inhalation daily    MEDICATIONS  (PRN):  baclofen 5 milliGRAM(s) Oral every 8 hours PRN Muscle Spasm  dextrose Oral Gel 15 Gram(s) Oral once PRN Blood Glucose LESS THAN 70 milliGRAM(s)/deciliter  melatonin 3 milliGRAM(s) Oral at bedtime PRN Sleep  ondansetron Injectable 4 milliGRAM(s) IV Push every 8 hours PRN Nausea and/or Vomiting  oxyCODONE    IR 15 milliGRAM(s) Oral every 4 hours PRN Moderate Pain (4 - 6)  simethicone 80 milliGRAM(s) Chew three times a day PRN Gas      Allergies  amitriptyline (Other)      Vital Signs Last 24 Hrs  T(C): 36.7 (30 Sep 2022 17:00), Max: 36.8 (30 Sep 2022 00:30)  T(F): 98.1 (30 Sep 2022 17:00), Max: 98.2 (30 Sep 2022 00:30)  HR: 72 (30 Sep 2022 20:55) (66 - 86)  BP: 158/76 (30 Sep 2022 17:00) (123/73 - 158/76)  BP(mean): 68 (30 Sep 2022 10:28) (68 - 68)  RR: 18 (30 Sep 2022 17:00) (18 - 18)  SpO2: 98% (30 Sep 2022 20:55) (94% - 100%)    Parameters below as of 30 Sep 2022 10:28  Patient On (Oxygen Delivery Method): nasal cannula        PHYSICAL EXAM:  GENERAL: NAD,    HEAD:  Atraumatic, Normocephalic  EYES: EOMI, PERRLA, conjunctiva and sclera clear  ENMT: No tonsillar erythema, exudates, or enlargement; Moist mucous membranes, Good dentition, No lesions  NECK: Supple, No JVD, Normal thyroid  NERVOUS SYSTEM:  Alert & Oriented X3, Good concentration; Motor Strength 5/5 B/L upper and lower extremities; DTRs 2+ intact and symmetric  CHEST/LUNG: Diminished , on NC  HEART: Regular rate and rhythm; No murmurs, rubs, or gallops  ABDOMEN: Soft, Nontender, Nondistended; Bowel sounds present  EXTREMITIES:  2+ edema B/L LE, improving   LYMPH: No lymphadenopathy noted  SKIN: No rashes or lesions        LABS:                        10.4   9.33  )-----------( 447      ( 30 Sep 2022 06:10 )             33.4     09-30    137  |  96  |  18  ----------------------------<  380<H>  3.5   |  33<H>  |  1.13    Ca    9.0      30 Sep 2022 06:10  Mg     1.6     09-29    TPro  7.5  /  Alb  3.1<L>  /  TBili  0.4  /  DBili  x   /  AST  15  /  ALT  27  /  AlkPhos  92  09-30        CAPILLARY BLOOD GLUCOSE      POCT Blood Glucose.: 387 mg/dL (30 Sep 2022 21:41)  POCT Blood Glucose.: 370 mg/dL (30 Sep 2022 16:42)  POCT Blood Glucose.: 373 mg/dL (30 Sep 2022 12:03)  POCT Blood Glucose.: 336 mg/dL (30 Sep 2022 07:55)      RADIOLOGY & ADDITIONAL TESTS:    Imaging Personally Reviewed:  [ ] YES  [ ] NO    Consultant(s) Notes Reviewed:  [ ] YES  [ ] NO    Care Discussed with Consultants/Other Providers [ ] YES  [ ] NO

## 2022-09-30 NOTE — PROGRESS NOTE ADULT - SUBJECTIVE AND OBJECTIVE BOX
Patient is a 52y old  Female who presents with a chief complaint of dyspnea, orthopnea (30 Sep 2022 10:37)      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;intercostal neuralgia&quot;)      Cardiac abnormality  (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;cardiac event&quot;; is unclear on diagnosis; denies hx/o MI)      Diabetes      Essential hypertension      IBS (irritable bowel syndrome)      Complex regional pain syndrome type 1      History of cholecystectomy      History of hand surgery  (pt states she had 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  	  MEDICATIONS:  MEDICATIONS  (STANDING):  buMETAnide Injectable 2 milliGRAM(s) IV Push two times a day  dextrose 5%. 1000 milliLiter(s) (50 mL/Hr) IV Continuous <Continuous>  dextrose 5%. 1000 milliLiter(s) (100 mL/Hr) IV Continuous <Continuous>  dextrose 50% Injectable 25 Gram(s) IV Push once  dextrose 50% Injectable 12.5 Gram(s) IV Push once  dextrose 50% Injectable 25 Gram(s) IV Push once  glucagon  Injectable 1 milliGRAM(s) IntraMuscular once  heparin   Injectable 7500 Unit(s) SubCutaneous every 12 hours  influenza   Vaccine 0.5 milliLiter(s) IntraMuscular once  insulin glargine Injectable (LANTUS) 20 Unit(s) SubCutaneous at bedtime  insulin lispro (ADMELOG) corrective regimen sliding scale   SubCutaneous at bedtime  insulin lispro (ADMELOG) corrective regimen sliding scale   SubCutaneous three times a day before meals  insulin lispro Injectable (ADMELOG) 4 Unit(s) SubCutaneous three times a day before meals  lisinopril 10 milliGRAM(s) Oral daily  NIFEdipine XL 60 milliGRAM(s) Oral daily  pantoprazole    Tablet 40 milliGRAM(s) Oral before breakfast  senna 2 Tablet(s) Oral at bedtime  sertraline 50 milliGRAM(s) Oral daily  spironolactone 25 milliGRAM(s) Oral daily  tiotropium 18 MICROgram(s) Capsule 1 Capsule(s) Inhalation daily    MEDICATIONS  (PRN):  baclofen 5 milliGRAM(s) Oral every 8 hours PRN Muscle Spasm  dextrose Oral Gel 15 Gram(s) Oral once PRN Blood Glucose LESS THAN 70 milliGRAM(s)/deciliter  melatonin 3 milliGRAM(s) Oral at bedtime PRN Sleep  ondansetron Injectable 4 milliGRAM(s) IV Push every 8 hours PRN Nausea and/or Vomiting  oxyCODONE    IR 30 milliGRAM(s) Oral every 4 hours PRN Severe Pain (7 - 10)  oxyCODONE    IR 15 milliGRAM(s) Oral every 4 hours PRN Moderate Pain (4 - 6)  simethicone 80 milliGRAM(s) Chew three times a day PRN Gas      Vitals:  T(F): 97.9 (09-30-22 @ 10:28), Max: 98.2 (09-30-22 @ 00:30)  HR: 72 (09-30-22 @ 10:28) (66 - 88)  BP: 129/- (09-30-22 @ 10:28) (123/73 - 139/80)  RR: 18 (09-30-22 @ 10:28) (18 - 18)  SpO2: 96% (09-30-22 @ 10:28) (94% - 100%)  Wt(kg): --    09-29 @ 07:01  -  09-30 @ 07:00  --------------------------------------------------------  IN:    Oral Fluid: 360 mL  Total IN: 360 mL    OUT:    Voided (mL): 2100 mL  Total OUT: 2100 mL    Total NET: -1740 mL      09-30 @ 07:01  -  09-30 @ 15:06  --------------------------------------------------------  IN:    Oral Fluid: 360 mL  Total IN: 360 mL    OUT:    Voided (mL): 1000 mL  Total OUT: 1000 mL    Total NET: -640 mL      PHYSICAL EXAM:  Neuro: Awake, responsive  CV: S1 S2 RRR  Lungs: CTABL  GI: Soft, BS +, ND, NT  Extremities: No edema    TELEMETRY: Continuos O2 sat monitoring  	    RADIOLOGY: < from: US Duplex Venous Lower Ext Complete, Bilateral (09.23.22 @ 15:59) >  IMPRESSION:  No evidence of deep venous thrombosis in either lower extremity.      < end of copied text >    < from: Xray Chest 2 Views PA/Lat (09.22.22 @ 00:47) >  FINDINGS:  Heart/Vascular: The heart size, mediastinum, hilum and aorta are within   normal limits for projection.  Pulmonary: Midline trachea. There is mild pulmonary venous congestion.    Bones: There is no fracture.  Lines and catheter: None    Impression:    Mild pulmonary venous congestion.      < end of copied text >      DIAGNOSTIC TESTING:    [x ] Echocardiogram: < from: TTE Echo Complete w/o Contrast w/ Doppler (09.28.22 @ 12:13) >  Summary:   1. Left ventricular ejection fraction, by visual estimation, is 60 to   65%.   2. Normal left ventricular size and wall thicknesses, with normal   systolic and diastolic function.   3. Normal trileaflet aortic valve with normal opening.   4. Estimated pulmonary artery systolic pressure is 44.2 mmHg assuming a   right atrial pressure of 8 mmHg, which is consistent with mild pulmonary   hypertension.    < end of copied text >       LABS:	 	    CARDIAC MARKERS:    30 Sep 2022 06:10    137    |  96     |  18     ----------------------------<  380    3.5     |  33     |  1.13   29 Sep 2022 06:27    138    |  100    |  15     ----------------------------<  293    3.7     |  32     |  1.03     Ca    9.0        30 Sep 2022 06:10  Mg     1.6       29 Sep 2022 06:27    TPro  7.5    /  Alb  3.1    /  TBili  0.4    /  DBili  x      /  AST  15     /  ALT  27     /  AlkPhos  92     30 Sep 2022 06:10                          10.4   9.33  )-----------( 447      ( 30 Sep 2022 06:10 )             33.4 ,                       10.1   9.22  )-----------( 404      ( 29 Sep 2022 06:27 )             33.1   proBNP:   Lipid Profile: 09-27 @ 06:21  HDL/Total Cholesterol: --  HDL Chol:              50 mg/dL  Serum Chol:            148 mg/dL  Direct LDL:            --  Triglycerides:         126 mg/dL    HgA1c:   TSH: Thyroid Stimulating Hormone, Serum: 3.630 uIU/mL (09-27 @ 06:21)                 TC to pt for social work referral follow up. Pt confirmed she received HCPOA/Living Will blank copies and declines assistance with completion. Social work outreach complete.      TEZ Dc, [facility]    Associate Care Management   [phone]

## 2022-09-30 NOTE — PROGRESS NOTE ADULT - SUBJECTIVE AND OBJECTIVE BOX
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;intercostal neuralgia&quot;)      Cardiac abnormality  (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;cardiac event&quot;; is unclear on diagnosis; denies hx/o MI)      Diabetes      Essential hypertension      IBS (irritable bowel syndrome)      Complex regional pain syndrome type 1      History of cholecystectomy      History of hand surgery  (pt states she had 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12)          FAMILY HISTORY:  FH: HTN (hypertension)        Home Medications:  benzonatate 100 mg oral capsule: 1 cap(s) orally 3 times a day (22 Jul 2022 16:48)  furosemide 40 mg oral tablet: 1 tab(s) orally once a day (22 Jul 2022 16:48)  guaiFENesin 1200 mg oral tablet, extended release: 1 tab(s) orally every 12 hours (22 Jul 2022 16:48)  lisinopril 10 mg oral tablet: 1 tab(s) orally once a day (22 Jul 2022 16:48)  mupirocin 2% topical ointment: 1 application topically 2 times a day (22 Jul 2022 16:48)  naloxegol 25 mg oral tablet: 1 tab(s) orally once a day (22 Jul 2022 16:48)  NIFEdipine 60 mg oral tablet, extended release: 1 tab(s) orally once a day (22 Jul 2022 16:48)  NovoLOG 100 units/mL injectable solution: 10 unit(s) subcutaneous 3 times a day (with meals) (11 Jul 2022 01:34)  oxyCODONE 15 mg oral tablet: 1 tab(s) orally every 4 hours, As needed, Severe Pain (7 - 10) (22 Jul 2022 16:48)  pantoprazole 40 mg oral delayed release tablet: 1 tab(s) orally once a day (before a meal) (22 Jul 2022 16:48)  pregabalin 100 mg oral capsule: 1 cap(s) orally every 8 hours (22 Jul 2022 16:48)  sertraline 50 mg oral tablet: 1 tab(s) orally once a day (22 Jul 2022 16:48)  Tresiba 100 units/mL subcutaneous solution: 80 unit(s) subcutaneous every 72 hours (11 Jul 2022 01:22)      MEDICATIONS  (STANDING):  buMETAnide Injectable 2 milliGRAM(s) IV Push two times a day  dextrose 5%. 1000 milliLiter(s) (50 mL/Hr) IV Continuous <Continuous>  dextrose 5%. 1000 milliLiter(s) (100 mL/Hr) IV Continuous <Continuous>  dextrose 50% Injectable 25 Gram(s) IV Push once  dextrose 50% Injectable 12.5 Gram(s) IV Push once  dextrose 50% Injectable 25 Gram(s) IV Push once  glucagon  Injectable 1 milliGRAM(s) IntraMuscular once  heparin   Injectable 7500 Unit(s) SubCutaneous every 12 hours  influenza   Vaccine 0.5 milliLiter(s) IntraMuscular once  insulin glargine Injectable (LANTUS) 20 Unit(s) SubCutaneous at bedtime  insulin lispro (ADMELOG) corrective regimen sliding scale   SubCutaneous at bedtime  insulin lispro (ADMELOG) corrective regimen sliding scale   SubCutaneous three times a day before meals  insulin lispro Injectable (ADMELOG) 4 Unit(s) SubCutaneous three times a day before meals  lisinopril 10 milliGRAM(s) Oral daily  NIFEdipine XL 60 milliGRAM(s) Oral daily  pantoprazole    Tablet 40 milliGRAM(s) Oral before breakfast  senna 2 Tablet(s) Oral at bedtime  sertraline 50 milliGRAM(s) Oral daily  spironolactone 25 milliGRAM(s) Oral daily  tiotropium 18 MICROgram(s) Capsule 1 Capsule(s) Inhalation daily    MEDICATIONS  (PRN):  baclofen 5 milliGRAM(s) Oral every 8 hours PRN Muscle Spasm  dextrose Oral Gel 15 Gram(s) Oral once PRN Blood Glucose LESS THAN 70 milliGRAM(s)/deciliter  melatonin 3 milliGRAM(s) Oral at bedtime PRN Sleep  ondansetron Injectable 4 milliGRAM(s) IV Push every 8 hours PRN Nausea and/or Vomiting  oxyCODONE    IR 30 milliGRAM(s) Oral every 4 hours PRN Severe Pain (7 - 10)  oxyCODONE    IR 15 milliGRAM(s) Oral every 4 hours PRN Moderate Pain (4 - 6)  simethicone 80 milliGRAM(s) Chew three times a day PRN Gas      Diet, Consistent Carbohydrate w/Evening Snack:   1200mL Fluid Restriction ([id number])  Low Sodium  Liquid Protein Supplement     Qty per Day:  1 (09-27-22 @ 12:07) [Active]          Vital Signs Last 24 Hrs  T(C): 36.8 (30 Sep 2022 04:58), Max: 36.8 (30 Sep 2022 00:30)  T(F): 98.2 (30 Sep 2022 04:58), Max: 98.2 (30 Sep 2022 00:30)  HR: 70 (30 Sep 2022 08:43) (66 - 88)  BP: 123/73 (30 Sep 2022 04:58) (123/73 - 139/80)  BP(mean): --  RR: 18 (30 Sep 2022 04:58) (18 - 18)  SpO2: 96% (30 Sep 2022 08:43) (94% - 100%)    Parameters below as of 30 Sep 2022 04:58  Patient On (Oxygen Delivery Method): nasal cannula  O2 Flow (L/min): 2        09-29-22 @ 07:01  -  09-30-22 @ 07:00  --------------------------------------------------------  IN: 360 mL / OUT: 2100 mL / NET: -1740 mL    09-30-22 @ 07:01  -  09-30-22 @ 10:38  --------------------------------------------------------  IN: 0 mL / OUT: 1000 mL / NET: -1000 mL              LABS:                        10.4   9.33  )-----------( 447      ( 30 Sep 2022 06:10 )             33.4     09-30    137  |  96  |  18  ----------------------------<  380<H>  3.5   |  33<H>  |  1.13    Ca    9.0      30 Sep 2022 06:10  Mg     1.6     09-29    TPro  7.5  /  Alb  3.1<L>  /  TBili  0.4  /  DBili  x   /  AST  15  /  ALT  27  /  AlkPhos  92  09-30              WBC:  WBC Count: 9.33 K/uL (09-30 @ 06:10)  WBC Count: 9.22 K/uL (09-29 @ 06:27)      MICROBIOLOGY:  RECENT CULTURES:  09-23 .Blood Blood XXXX XXXX   No Growth Final                    Sodium:  Sodium, Serum: 137 mmol/L (09-30 @ 06:10)  Sodium, Serum: 138 mmol/L (09-29 @ 06:27)  Sodium, Serum: 137 mmol/L (09-27 @ 06:21)      1.13 mg/dL 09-30 @ 06:10  1.03 mg/dL 09-29 @ 06:27  0.90 mg/dL 09-27 @ 06:21      Hemoglobin:  Hemoglobin: 10.4 g/dL (09-30 @ 06:10)  Hemoglobin: 10.1 g/dL (09-29 @ 06:27)      Platelets: Platelet Count - Automated: 447 K/uL (09-30 @ 06:10)  Platelet Count - Automated: 404 K/uL (09-29 @ 06:27)      LIVER FUNCTIONS - ( 30 Sep 2022 06:10 )  Alb: 3.1 g/dL / Pro: 7.5 gm/dL / ALK PHOS: 92 U/L / ALT: 27 U/L / AST: 15 U/L / GGT: x                 RADIOLOGY & ADDITIONAL STUDIES:      MICROBIOLOGY:  RECENT CULTURES:  09-23 .Blood Blood XXXX XXXX   No Growth Final

## 2022-09-30 NOTE — PROGRESS NOTE ADULT - ASSESSMENT
Patient is a 52F with a PMH of chronic pain syndrome (spinal nerve stimulator placed 07/2022, morbid obesity, DM, HTN, CAD, IBS, pseudotumor cerebri who presents to the ED for dyspnea.  Patient states that over the last 3 weeks she has had increased swelling in her lower extremities (worse on the R side) and has also developed dyspnea and orthopnea.  Patient reportedly requiring increased oxygen concentrations at the NH - as high as 10L/min?  Patient concerned as she was started on lasix at the NH however her urination has not increased.  Patient also reports 15 lb weight gain in the last two weeks.  Presented to ED for evaluation.  Being treated for CHF exacerbation.    DC to [facility] when stable

## 2022-10-01 LAB
ALBUMIN SERPL ELPH-MCNC: 3 G/DL — LOW (ref 3.3–5)
ALP SERPL-CCNC: 87 U/L — SIGNIFICANT CHANGE UP (ref 40–120)
ALT FLD-CCNC: 29 U/L — SIGNIFICANT CHANGE UP (ref 12–78)
ANION GAP SERPL CALC-SCNC: 6 MMOL/L — SIGNIFICANT CHANGE UP (ref 5–17)
AST SERPL-CCNC: 18 U/L — SIGNIFICANT CHANGE UP (ref 15–37)
BILIRUB SERPL-MCNC: 0.4 MG/DL — SIGNIFICANT CHANGE UP (ref 0.2–1.2)
BUN SERPL-MCNC: 16 MG/DL — SIGNIFICANT CHANGE UP (ref 7–23)
CALCIUM SERPL-MCNC: 8.9 MG/DL — SIGNIFICANT CHANGE UP (ref 8.5–10.1)
CHLORIDE SERPL-SCNC: 94 MMOL/L — LOW (ref 96–108)
CO2 SERPL-SCNC: 33 MMOL/L — HIGH (ref 22–31)
CREAT SERPL-MCNC: 1.02 MG/DL — SIGNIFICANT CHANGE UP (ref 0.5–1.3)
EGFR: 66 ML/MIN/1.73M2 — SIGNIFICANT CHANGE UP
GLUCOSE BLDC GLUCOMTR-MCNC: 350 MG/DL — HIGH (ref 70–99)
GLUCOSE SERPL-MCNC: 335 MG/DL — HIGH (ref 70–99)
HCT VFR BLD CALC: 34.5 % — SIGNIFICANT CHANGE UP (ref 34.5–45)
HGB BLD-MCNC: 10.8 G/DL — LOW (ref 11.5–15.5)
MCHC RBC-ENTMCNC: 27.4 PG — SIGNIFICANT CHANGE UP (ref 27–34)
MCHC RBC-ENTMCNC: 31.3 G/DL — LOW (ref 32–36)
MCV RBC AUTO: 87.6 FL — SIGNIFICANT CHANGE UP (ref 80–100)
NRBC # BLD: 0 /100 WBCS — SIGNIFICANT CHANGE UP (ref 0–0)
PLATELET # BLD AUTO: 450 K/UL — HIGH (ref 150–400)
POTASSIUM SERPL-MCNC: 3.3 MMOL/L — LOW (ref 3.5–5.3)
POTASSIUM SERPL-SCNC: 3.3 MMOL/L — LOW (ref 3.5–5.3)
PROT SERPL-MCNC: 7.7 GM/DL — SIGNIFICANT CHANGE UP (ref 6–8.3)
RBC # BLD: 3.94 M/UL — SIGNIFICANT CHANGE UP (ref 3.8–5.2)
RBC # FLD: 14 % — SIGNIFICANT CHANGE UP (ref 10.3–14.5)
SODIUM SERPL-SCNC: 133 MMOL/L — LOW (ref 135–145)
WBC # BLD: 9.1 K/UL — SIGNIFICANT CHANGE UP (ref 3.8–10.5)
WBC # FLD AUTO: 9.1 K/UL — SIGNIFICANT CHANGE UP (ref 3.8–10.5)

## 2022-10-01 PROCEDURE — 99232 SBSQ HOSP IP/OBS MODERATE 35: CPT

## 2022-10-01 PROCEDURE — 99233 SBSQ HOSP IP/OBS HIGH 50: CPT

## 2022-10-01 RX ORDER — INSULIN LISPRO 100/ML
8 VIAL (ML) SUBCUTANEOUS
Refills: 0 | Status: DISCONTINUED | OUTPATIENT
Start: 2022-10-01 | End: 2022-10-03

## 2022-10-01 RX ORDER — OXYCODONE HYDROCHLORIDE 5 MG/1
15 TABLET ORAL EVERY 4 HOURS
Refills: 0 | Status: DISCONTINUED | OUTPATIENT
Start: 2022-10-01 | End: 2022-10-04

## 2022-10-01 RX ORDER — POTASSIUM CHLORIDE 20 MEQ
20 PACKET (EA) ORAL
Refills: 0 | Status: COMPLETED | OUTPATIENT
Start: 2022-10-01 | End: 2022-10-01

## 2022-10-01 RX ADMIN — TIOTROPIUM BROMIDE 1 CAPSULE(S): 18 CAPSULE ORAL; RESPIRATORY (INHALATION) at 16:07

## 2022-10-01 RX ADMIN — Medication 20 MILLIEQUIVALENT(S): at 12:05

## 2022-10-01 RX ADMIN — BUMETANIDE 2 MILLIGRAM(S): 0.25 INJECTION INTRAMUSCULAR; INTRAVENOUS at 06:30

## 2022-10-01 RX ADMIN — SERTRALINE 50 MILLIGRAM(S): 25 TABLET, FILM COATED ORAL at 12:36

## 2022-10-01 RX ADMIN — Medication 4: at 08:26

## 2022-10-01 RX ADMIN — OXYCODONE HYDROCHLORIDE 15 MILLIGRAM(S): 5 TABLET ORAL at 22:21

## 2022-10-01 RX ADMIN — SPIRONOLACTONE 25 MILLIGRAM(S): 25 TABLET, FILM COATED ORAL at 05:54

## 2022-10-01 RX ADMIN — Medication 8 UNIT(S): at 17:33

## 2022-10-01 RX ADMIN — Medication 5 MILLIGRAM(S): at 23:53

## 2022-10-01 RX ADMIN — HEPARIN SODIUM 7500 UNIT(S): 5000 INJECTION INTRAVENOUS; SUBCUTANEOUS at 05:56

## 2022-10-01 RX ADMIN — OXYCODONE HYDROCHLORIDE 15 MILLIGRAM(S): 5 TABLET ORAL at 23:15

## 2022-10-01 RX ADMIN — Medication 8: at 22:20

## 2022-10-01 RX ADMIN — SENNA PLUS 2 TABLET(S): 8.6 TABLET ORAL at 22:22

## 2022-10-01 RX ADMIN — INSULIN GLARGINE 20 UNIT(S): 100 INJECTION, SOLUTION SUBCUTANEOUS at 22:21

## 2022-10-01 RX ADMIN — HEPARIN SODIUM 7500 UNIT(S): 5000 INJECTION INTRAVENOUS; SUBCUTANEOUS at 18:58

## 2022-10-01 RX ADMIN — Medication 4 UNIT(S): at 08:26

## 2022-10-01 RX ADMIN — Medication 6: at 17:32

## 2022-10-01 RX ADMIN — PANTOPRAZOLE SODIUM 40 MILLIGRAM(S): 20 TABLET, DELAYED RELEASE ORAL at 05:53

## 2022-10-01 RX ADMIN — OXYCODONE HYDROCHLORIDE 15 MILLIGRAM(S): 5 TABLET ORAL at 12:05

## 2022-10-01 RX ADMIN — OXYCODONE HYDROCHLORIDE 15 MILLIGRAM(S): 5 TABLET ORAL at 12:40

## 2022-10-01 RX ADMIN — LISINOPRIL 10 MILLIGRAM(S): 2.5 TABLET ORAL at 05:54

## 2022-10-01 RX ADMIN — OXYCODONE HYDROCHLORIDE 15 MILLIGRAM(S): 5 TABLET ORAL at 16:23

## 2022-10-01 RX ADMIN — Medication 3 MILLIGRAM(S): at 00:13

## 2022-10-01 RX ADMIN — OXYCODONE HYDROCHLORIDE 15 MILLIGRAM(S): 5 TABLET ORAL at 16:55

## 2022-10-01 RX ADMIN — BUMETANIDE 2 MILLIGRAM(S): 0.25 INJECTION INTRAMUSCULAR; INTRAVENOUS at 16:07

## 2022-10-01 RX ADMIN — Medication 4 UNIT(S): at 12:37

## 2022-10-01 RX ADMIN — Medication 3 MILLIGRAM(S): at 23:53

## 2022-10-01 RX ADMIN — Medication 20 MILLIEQUIVALENT(S): at 16:07

## 2022-10-01 RX ADMIN — Medication 6: at 12:37

## 2022-10-01 NOTE — PROGRESS NOTE ADULT - ASSESSMENT
REVIEW OF SYMPTOMS      Able to give ROS  Yes     RELIABLE +/-   CONSTITUTIONAL Weakness Yes  Chills No   ENDOCRINE  No heat or cold intolerance    ALLERGY No hives  Sore throat No stridor  RESP Coughing blood no  Shortness of breath YES   NEURO No Headache  Confusion Pain neck No   CARDIAC No Chest pain No Palpitations   GI  Pain abdomen NO   Vomiting NO     NOTABLE FINDINGS    PHYSICAL EXAM    HEENT Unremarkable  atraumatic   RESP Fair air entry EXP prolonged    Harsh breath sound Resp distres mild   CARDIAC S1 S2 No S3     NO JVD    ABDOMEN SOFT BS PRESENT NOT DISTENDED No hepatosplenomegaly PEDAL EDEMA present No calf tenderness  NO rash       AGE/SEX.   52 f  DOA.  9/22/2022   CC .  9/22/2022 from [facility] with sob fluid retentn incr o2 requiremnt     RECENT HOSPITAL STAY.  7/10-7/23 [facility] Fever cough sob  Rxed mf pneum needed intubatn ards then bpap   extubatd 7/17 needed proni     HOSPITAL COURSE.   Fever 9/23/2022  COPD  Obesity   RO DVR   RO OHS   RO HFPEF    PMH .  pmh Former smoker smoker 45 y quit 2013   pmh marijuana smoker   pmh DM2  pmh Hytn   pmh IBS   pmh CRPS (Complex regional pain syndrome)  pmh spinal nerve stimulatoir 7/8/2022   pms pseudotumor cerebri          GENERAL DATA .   GOC.   9/22/2022 full code     ALLGY.       amitriptiline                       WT.   9/22/2022 156        BMI.    9/22/2022 52                           ICU STAY.  none  COVID.  9/24/2022 scv2 (-)     BEST PRACTICE ISSUES.    HOB ELEVATN. Yes  DVT PPLX. 9/22/2022 hpsc 7500.2      EWING PPLX.   9/22/2022 protonix 40    INFN PPLX.    SP SW DAWIT.   7/19 FEES      DIET.  9/22/2022 cons carb      VS/ PO/IO/ VENT/ DRIPS.  10/1/2022 afeb 70 130/70     ASSESSMENT/RECOMMENDATIONS .   RESP.  SHORTNESS OF BREATH.  Shortness of breath is likely multifactorial.  She had recent ards sec aspn pneum  She has morbid obesity  bmi 52  She may have OHS   She has hfpef  She will need further eval as outpt including pfts and sleep study    COPD.  9/23/2022 spiriva   cont rx    DVT pplx   9/22/2022 Is on [facility]    RESP FAILURE.  9/24/2022 5a 12/5/.6 743/49/147  9/22/2022 CO2 is 27    Target po 90-95% pH 730 (+)  IS ON NOCT BPAP for  STAS   will need abg close to discharge to determine need for home nppv     RO PE.  D-dimr 9/22/2022 dd 461  ct ch 9/23 no pe  v duplx 9/23 (-)   no vte     PULM HYTN   9/30/2022 echo pasp 44 n l vs and d f  9/30/2022 ph may nbe sec ohs and stas   Needes outpt [place] of ph including rhc to see if she has precapillary ph    INFECTION.  W 9/22-9/23-9/24-9/30/2022 w 14- 10.0 - 9.9 - 9.3    ct ch 9/23 no pe ggo and patchy airspace opac atelectasis bl  and tr pl effs  flu ab 9/22/2022 (-)   rsv 9/22/2022 (-)   rvp 9/24 (-)   CT ch findings likely resolving ards which she  had 7/10-7/23 admission [facility]     CARDIAC.  CAD.  tr 9/22/2022 tr 15  mi ruld out    HYTN.  9/22 nifedipine 60    bp controlld     HFPEF.  echo 7/13/2022 hyperdynamic lvsf n rvsf n  echo 9/28/2022 ef 60% pasp 44  n lv syst and diast fn   bnp 9/22/2022 bnp 87   9/22/2022 lisinopril 10   9/25 spironolact 25   9/25/2022 bumetanide 2.2   Cardio felt that low bnp may be false negv sec obsity and female sex and prior tte may not be indicative of current status   Good response to diuretics makes lvot obstrn less likely    To repeat tte when more euvolemic and able to lay flat    GI.  no active issues    HEMAT.  Hb 9/22-9/24/2022 Hb 10 - 9.9  monitor     RENAL.  Na 9/22-9/28-10/1/2022 Na 145 - 137- 133   Cr 9/22-9/27-9/30/2022 Cr .8 - .9 - 1.1   moniytor    TIME SPENT   Over 25 minutes aggregate care time spent on encounter; activities included   direct patient care, counseling and/or coordinating care reviewing notes, lab data/ imaging , discussion with multidisciplinary team/ patient  /family and explaining in detail risks, benefits, alternatives  of the recommendations     MARIBEL KELLY 52 f 1970 9/22/2022 DR JUVENCIO COKER

## 2022-10-01 NOTE — PROGRESS NOTE ADULT - ASSESSMENT
52F with a PMH of chronic pain syndrome (spinal nerve stimulator placed 07/2022, morbid obesity, DM, HTN, CAD, IBS, pseudotumor cerebri who presents to the ED for dyspnea. and worsening edema BLLE   Patient reportedly requiring increased oxygen concentrations at the NH - as high as 10L/min?  Patient concerned as she was started on lasix at the NH however her urination has not increased.  Patient also reports 15 lb weight gain in the last two weeks.  Presented to ED for evaluation.  Being treated for CHF exacerbation.

## 2022-10-01 NOTE — PROGRESS NOTE ADULT - SUBJECTIVE AND OBJECTIVE BOX
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;intercostal neuralgia&quot;)      Cardiac abnormality  (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;cardiac event&quot;; is unclear on diagnosis; denies hx/o MI)      Diabetes      Essential hypertension      IBS (irritable bowel syndrome)      Complex regional pain syndrome type 1      History of cholecystectomy      History of hand surgery  (pt states she had 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12)          FAMILY HISTORY:  FH: HTN (hypertension)        Home Medications:  benzonatate 100 mg oral capsule: 1 cap(s) orally 3 times a day (22 Jul 2022 16:48)  furosemide 40 mg oral tablet: 1 tab(s) orally once a day (22 Jul 2022 16:48)  guaiFENesin 1200 mg oral tablet, extended release: 1 tab(s) orally every 12 hours (22 Jul 2022 16:48)  lisinopril 10 mg oral tablet: 1 tab(s) orally once a day (22 Jul 2022 16:48)  mupirocin 2% topical ointment: 1 application topically 2 times a day (22 Jul 2022 16:48)  naloxegol 25 mg oral tablet: 1 tab(s) orally once a day (22 Jul 2022 16:48)  NIFEdipine 60 mg oral tablet, extended release: 1 tab(s) orally once a day (22 Jul 2022 16:48)  NovoLOG 100 units/mL injectable solution: 10 unit(s) subcutaneous 3 times a day (with meals) (11 Jul 2022 01:34)  oxyCODONE 15 mg oral tablet: 1 tab(s) orally every 4 hours, As needed, Severe Pain (7 - 10) (22 Jul 2022 16:48)  pantoprazole 40 mg oral delayed release tablet: 1 tab(s) orally once a day (before a meal) (22 Jul 2022 16:48)  pregabalin 100 mg oral capsule: 1 cap(s) orally every 8 hours (22 Jul 2022 16:48)  sertraline 50 mg oral tablet: 1 tab(s) orally once a day (22 Jul 2022 16:48)  Tresiba 100 units/mL subcutaneous solution: 80 unit(s) subcutaneous every 72 hours (11 Jul 2022 01:22)      MEDICATIONS  (STANDING):  buMETAnide Injectable 2 milliGRAM(s) IV Push two times a day  dextrose 5%. 1000 milliLiter(s) (50 mL/Hr) IV Continuous <Continuous>  dextrose 5%. 1000 milliLiter(s) (100 mL/Hr) IV Continuous <Continuous>  dextrose 50% Injectable 25 Gram(s) IV Push once  dextrose 50% Injectable 12.5 Gram(s) IV Push once  dextrose 50% Injectable 25 Gram(s) IV Push once  glucagon  Injectable 1 milliGRAM(s) IntraMuscular once  heparin   Injectable 7500 Unit(s) SubCutaneous every 12 hours  influenza   Vaccine 0.5 milliLiter(s) IntraMuscular once  insulin glargine Injectable (LANTUS) 20 Unit(s) SubCutaneous at bedtime  insulin lispro (ADMELOG) corrective regimen sliding scale   SubCutaneous at bedtime  insulin lispro (ADMELOG) corrective regimen sliding scale   SubCutaneous three times a day before meals  insulin lispro Injectable (ADMELOG) 4 Unit(s) SubCutaneous three times a day before meals  lisinopril 10 milliGRAM(s) Oral daily  NIFEdipine XL 60 milliGRAM(s) Oral daily  pantoprazole    Tablet 40 milliGRAM(s) Oral before breakfast  potassium chloride    Tablet ER 20 milliEquivalent(s) Oral every 2 hours  senna 2 Tablet(s) Oral at bedtime  sertraline 50 milliGRAM(s) Oral daily  spironolactone 25 milliGRAM(s) Oral daily  tiotropium 18 MICROgram(s) Capsule 1 Capsule(s) Inhalation daily    MEDICATIONS  (PRN):  baclofen 5 milliGRAM(s) Oral every 8 hours PRN Muscle Spasm  dextrose Oral Gel 15 Gram(s) Oral once PRN Blood Glucose LESS THAN 70 milliGRAM(s)/deciliter  melatonin 3 milliGRAM(s) Oral at bedtime PRN Sleep  ondansetron Injectable 4 milliGRAM(s) IV Push every 8 hours PRN Nausea and/or Vomiting  simethicone 80 milliGRAM(s) Chew three times a day PRN Gas      Diet, Consistent Carbohydrate w/Evening Snack:   1200mL Fluid Restriction ([id number])  Low Sodium  Liquid Protein Supplement     Qty per Day:  1 (09-27-22 @ 12:07) [Active]          Vital Signs Last 24 Hrs  T(C): 36.6 (01 Oct 2022 05:18), Max: 36.8 (01 Oct 2022 00:44)  T(F): 97.8 (01 Oct 2022 05:18), Max: 98.3 (01 Oct 2022 00:44)  HR: 74 (01 Oct 2022 08:28) (65 - 86)  BP: 118/57 (01 Oct 2022 05:18) (95/60 - 158/76)  BP(mean): --  RR: 18 (01 Oct 2022 05:18) (18 - 18)  SpO2: 95% (01 Oct 2022 08:28) (95% - 100%)    Parameters below as of 01 Oct 2022 05:18  Patient On (Oxygen Delivery Method): nasal cannula          09-30-22 @ 07:01  -  10-01-22 @ 07:00  --------------------------------------------------------  IN: 360 mL / OUT: 1000 mL / NET: -640 mL              LABS:                        10.8   9.10  )-----------( 450      ( 01 Oct 2022 06:42 )             34.5     10-01    133<L>  |  94<L>  |  16  ----------------------------<  335<H>  3.3<L>   |  33<H>  |  1.02    Ca    8.9      01 Oct 2022 06:42    TPro  7.7  /  Alb  3.0<L>  /  TBili  0.4  /  DBili  x   /  AST  18  /  ALT  29  /  AlkPhos  87  10-01              WBC:  WBC Count: 9.10 K/uL (10-01 @ 06:42)  WBC Count: 9.33 K/uL (09-30 @ 06:10)  WBC Count: 9.22 K/uL (09-29 @ 06:27)      MICROBIOLOGY:  RECENT CULTURES:                  Sodium:  Sodium, Serum: 133 mmol/L (10-01 @ 06:42)  Sodium, Serum: 137 mmol/L (09-30 @ 06:10)  Sodium, Serum: 138 mmol/L (09-29 @ 06:27)      1.02 mg/dL 10-01 @ 06:42  1.13 mg/dL 09-30 @ 06:10  1.03 mg/dL 09-29 @ 06:27      Hemoglobin:  Hemoglobin: 10.8 g/dL (10-01 @ 06:42)  Hemoglobin: 10.4 g/dL (09-30 @ 06:10)  Hemoglobin: 10.1 g/dL (09-29 @ 06:27)      Platelets: Platelet Count - Automated: 450 K/uL (10-01 @ 06:42)  Platelet Count - Automated: 447 K/uL (09-30 @ 06:10)  Platelet Count - Automated: 404 K/uL (09-29 @ 06:27)      LIVER FUNCTIONS - ( 01 Oct 2022 06:42 )  Alb: 3.0 g/dL / Pro: 7.7 gm/dL / ALK PHOS: 87 U/L / ALT: 29 U/L / AST: 18 U/L / GGT: x                 RADIOLOGY & ADDITIONAL STUDIES:      MICROBIOLOGY:  RECENT CULTURES:

## 2022-10-01 NOTE — PROGRESS NOTE ADULT - SUBJECTIVE AND OBJECTIVE BOX
CHIEF COMPLAINT:  Patient is a 52y old  Female who presents with a chief complaint of dyspnea, orthopnea (01 Oct 2022 16:25)      HPI:  Patient is a 52F with a PMH of chronic pain syndrome (spinal nerve stimulator placed 07/2022, morbid obesity, DM, HTN, CAD, IBS, pseudotumor cerebri who presents to the ED for dyspnea.  Patient states that over the last 3 weeks she has had increased swelling in her lower extremities (worse on the R side) and has also developed dyspnea and orthopnea.  Patient reportedly requiring increased oxygen concentrations at the NH - as high as 10L/min?  Patient concerned as she was started on lasix at the NH however her urination has not increased.  Patient also reports 15 lb weight gain in the last two weeks.  Presented to ED for evaluation.  Patient SpO2 currently 97% on 4L via NC.  Labs show leukocytosis and hypokalemia.  Will admit to med surg    REVIEW OF SYSTEMS:  General:  No wt loss, fevers, chills, night sweats  Eyes:  Good vision, no reported pain  ENT:  No sore throat, pain, runny nose, dysphagia  CV:  No pain, palpitations, hypo/hypertension  Resp:  No dyspnea, cough, tachypnea, wheezing  GI:  No pain, nausea, vomiting, diarrhea, constipation  :  No pain, bleeding, incontinence, nocturia  Muscle:  No pain, weakness  Breast:  No pain, abscess, mass, discharge  Neuro:  No weakness, tingling, memory problems  Psych:  No fatigue, insomnia, mood problems, depression  Endocrine:  No polyuria, polydipsia, cold/heat intolerance  Heme:  No petechiae, ecchymosis, easy bruisability  Skin:  No rash, edema    PHYSICAL EXAM:  Vital Signs:  Vital Signs Last 24 Hrs  T(C): 36.6 (01 Oct 2022 16:22), Max: 36.8 (01 Oct 2022 00:44)  T(F): 97.9 (01 Oct 2022 16:22), Max: 98.3 (01 Oct 2022 00:44)  HR: 69 (01 Oct 2022 16:28) (65 - 75)  BP: 135/75 (01 Oct 2022 16:22) (95/60 - 149/73)  RR: 19 (01 Oct 2022 16:22) (18 - 19)  SpO2: 95% (01 Oct 2022 16:28) (94% - 100%)    Parameters below as of 01 Oct 2022 05:18  Patient On (Oxygen Delivery Method): nasal cannula      I&O's Summary    30 Sep 2022 07:01  -  01 Oct 2022 07:00  --------------------------------------------------------  IN: 360 mL / OUT: 1000 mL / NET: -640 mL    01 Oct 2022 07:01  -  01 Oct 2022 17:24  --------------------------------------------------------  IN: 350 mL / OUT: 0 mL / NET: 350 mL      I&O's Detail    30 Sep 2022 07:01  -  01 Oct 2022 07:00  --------------------------------------------------------  IN:    Oral Fluid: 360 mL  Total IN: 360 mL    OUT:    Voided (mL): 1000 mL  Total OUT: 1000 mL    Total NET: -640 mL      01 Oct 2022 07:01  -  01 Oct 2022 17:24  --------------------------------------------------------  IN:    Oral Fluid: 350 mL  Total IN: 350 mL    OUT:  Total OUT: 0 mL    Total NET: 350 mL        Tele: SR    Constitutional: well developed, normal appearance, well groomed, well nourished, no deformities and no acute distress.   Eyes: the conjunctiva exhibited no abnormalities and the eyelids demonstrated no xanthelasmas.   HEENT: normal oral mucosa, no oral pallor and no oral cyanosis.   Neck: normal jugular venous A waves present, normal jugular venous V waves present and no jugular venous malcolm A waves.   Pulmonary: no respiratory distress, normal respiratory rhythm and effort, no accessory muscle use and lungs were clear to auscultation bilaterally.   Cardiovascular: heart rate and rhythm were normal, normal S1 and S2 and no murmur, gallop, rub, heave or thrill are present.   Abdomen: soft, non-tender, no hepato-splenomegaly and no abdominal mass palpated.   Musculoskeletal: the gait could not be assessed.   Extremities: no clubbing of the fingernails, no localized cyanosis, no petechial hemorrhages and no ischemic changes.   Skin: normal skin color and pigmentation, no rash, no venous stasis, no skin lesions, no skin ulcer and no xanthoma was observed.   Psychiatric: oriented to person, place, and time, the affect was normal, the mood was normal and not feeling anxious.      LABORATORY:                          10.8   9.10  )-----------( 450      ( 01 Oct 2022 06:42 )             34.5     10-01    133<L>  |  94<L>  |  16  ----------------------------<  335<H>  3.3<L>   |  33<H>  |  1.02    Ca    8.9      01 Oct 2022 06:42    TPro  7.7  /  Alb  3.0<L>  /  TBili  0.4  /  DBili  x   /  AST  18  /  ALT  29  /  AlkPhos  87  10-01      CAPILLARY BLOOD GLUCOSE  POCT Blood Glucose.: 404 mg/dL (01 Oct 2022 16:44)  POCT Blood Glucose.: 442 mg/dL (01 Oct 2022 12:08)  POCT Blood Glucose.: 350 mg/dL (01 Oct 2022 08:03)  POCT Blood Glucose.: 387 mg/dL (30 Sep 2022 21:41)    LIVER FUNCTIONS - ( 01 Oct 2022 06:42 )  Alb: 3.0 g/dL / Pro: 7.7 gm/dL / ALK PHOS: 87 U/L / ALT: 29 U/L / AST: 18 U/L / GGT: x             IMAGING:  < from: 12 Lead ECG (09.21.22 @ 22:58) >  *** Poor data quality, interpretation may be adversely affected  Normal sinus rhythm probably  T wave abnormality, consider inferior ischemia  Abnormal ECG  When compared with ECG of 27-JUL-2021 18:31,  Nonspecific T wave abnormality now evident in Inferior leads    < end of copied text >    < from: CT Angio Chest PE Protocol w/ IV Cont (09.23.22 @ 15:15) >  IMPRESSION:  No evidence of central or lobar pulmonary emboli.    A combination of groundglass opacity and patchy airspace   disease/atelectasis involving the lungs bilaterally along with trace to   small pleural effusions.    < end of copied text >    < from: TTE Echo Complete w/o Contrast w/ Doppler (09.28.22 @ 12:13) >  Summary:   1. Left ventricular ejection fraction, by visual estimation, is 60 to   65%.   2. Normal left ventricular size and wall thicknesses, with normal   systolic and diastolic function.   3. Normal trileaflet aortic valve with normal opening.   4. Estimated pulmonary artery systolic pressure is 44.2 mmHg assuming a   right atrial pressure of 8 mmHg, which is consistent with mild pulmonary   hypertension.    < end of copied text >    ASSESSMENT:  Patient is a 52F with a PMH of chronic pain syndrome (spinal nerve stimulator placed 07/2022, morbid obesity, DM, HTN, CAD, IBS, pseudotumor cerebri who presents to the ED for dyspnea.  Patient states that over the last 3 weeks she has had increased swelling in her lower extremities (worse on the R side) and has also developed dyspnea and orthopnea.  Patient reportedly requiring increased oxygen concentrations at the NH - as high as 10L/min?  Patient concerned as she was started on lasix at the NH however her urination has not increased.  Patient also reports 15 lb weight gain in the last two weeks.  Presented to ED for evaluation.  Patient SpO2 currently 97% on 4L via NC.  Labs show leukocytosis and hypokalemia.  Will admit to med surg      PLAN:       buMETAnide Injectable 2 milliGRAM(s) IV Push two times a day  heparin   Injectable 7500 Unit(s) SubCutaneous every 12 hours  insulin glargine Injectable (LANTUS) 20 Unit(s) SubCutaneous at bedtime  insulin lispro (ADMELOG) corrective regimen sliding scale   SubCutaneous at bedtime  insulin lispro (ADMELOG) corrective regimen sliding scale   SubCutaneous three times a day before meals  insulin lispro Injectable (ADMELOG) 8 Unit(s) SubCutaneous three times a day before meals  lisinopril 10 milliGRAM(s) Oral daily  NIFEdipine XL 60 milliGRAM(s) Oral daily  pantoprazole    Tablet 40 milliGRAM(s) Oral before breakfast  senna 2 Tablet(s) Oral at bedtime  sertraline 50 milliGRAM(s) Oral daily  spironolactone 25 milliGRAM(s) Oral daily  tiotropium 18 MICROgram(s) Capsule 1 Capsule(s) Inhalation daily    prelim note    Td Dozier MD, FACC, FASE, FASNC, FACP  Director, Heart Failure Services  [facility]  , Department of Cardiology  [facility]     CHIEF COMPLAINT:  Patient is a 52y old  Female who presents with a chief complaint of dyspnea, orthopnea (01 Oct 2022 16:25)      HPI:  Fauzia is a pleasant 52-year-old female with history of chronic pain syndrome, diabetes, hypertension, morbid obesity, CAD, IBS, pseudotumor cerebri admitted with dyspnea and increasing lower extremity swelling with orthopnea.  Responsive to oxygen and diuretics.  No evidence of PE.  Likely element of heart failure preserved ejection fraction.    REVIEW OF SYSTEMS:  General:  No wt loss, fevers, chills, night sweats  Eyes:  Good vision, no reported pain  ENT:  No sore throat, pain, runny nose, dysphagia  CV:  No pain, palpitations, hypo/hypertension  Resp:  No dyspnea, cough, tachypnea, wheezing  GI:  No pain, nausea, vomiting, diarrhea, constipation  :  No pain, bleeding, incontinence, nocturia  Muscle:  No pain, weakness  Breast:  No pain, abscess, mass, discharge  Neuro:  No weakness, tingling, memory problems  Psych:  No fatigue, insomnia, mood problems, depression  Endocrine:  No polyuria, polydipsia, cold/heat intolerance  Heme:  No petechiae, ecchymosis, easy bruisability  Skin:  No rash, edema    PHYSICAL EXAM:  Vital Signs:  Vital Signs Last 24 Hrs  T(C): 36.6 (01 Oct 2022 16:22), Max: 36.8 (01 Oct 2022 00:44)  T(F): 97.9 (01 Oct 2022 16:22), Max: 98.3 (01 Oct 2022 00:44)  HR: 69 (01 Oct 2022 16:28) (65 - 75)  BP: 135/75 (01 Oct 2022 16:22) (95/60 - 149/73)  RR: 19 (01 Oct 2022 16:22) (18 - 19)  SpO2: 95% (01 Oct 2022 16:28) (94% - 100%)    Parameters below as of 01 Oct 2022 05:18  Patient On (Oxygen Delivery Method): nasal cannula      I&O's Summary    30 Sep 2022 07:01  -  01 Oct 2022 07:00  --------------------------------------------------------  IN: 360 mL / OUT: 1000 mL / NET: -640 mL    01 Oct 2022 07:01  -  01 Oct 2022 17:24  --------------------------------------------------------  IN: 350 mL / OUT: 0 mL / NET: 350 mL      I&O's Detail    30 Sep 2022 07:01  -  01 Oct 2022 07:00  --------------------------------------------------------  IN:    Oral Fluid: 360 mL  Total IN: 360 mL    OUT:    Voided (mL): 1000 mL  Total OUT: 1000 mL    Total NET: -640 mL      01 Oct 2022 07:01  -  01 Oct 2022 17:24  --------------------------------------------------------  IN:    Oral Fluid: 350 mL  Total IN: 350 mL    OUT:  Total OUT: 0 mL    Total NET: 350 mL        Tele: SR    Constitutional: well developed, normal appearance, well groomed, well nourished, no deformities and no acute distress.   Eyes: the conjunctiva exhibited no abnormalities and the eyelids demonstrated no xanthelasmas.   HEENT: normal oral mucosa, no oral pallor and no oral cyanosis.   Neck: normal jugular venous A waves present, normal jugular venous V waves present and no jugular venous malcolm A waves.   Pulmonary: no respiratory distress, normal respiratory rhythm and effort, no accessory muscle use and lungs were clear to auscultation bilaterally.   Cardiovascular: heart rate and rhythm were normal, normal S1 and S2 and no murmur, gallop, rub, heave or thrill are present.   Abdomen: soft, non-tender, no hepato-splenomegaly and no abdominal mass palpated.   Musculoskeletal: the gait could not be assessed.   Extremities: no clubbing of the fingernails, no localized cyanosis, no petechial hemorrhages and no ischemic changes.   Skin: normal skin color and pigmentation, no rash, no venous stasis, no skin lesions, no skin ulcer and no xanthoma was observed.   Psychiatric: oriented to person, place, and time, the affect was normal, the mood was normal and not feeling anxious.      LABORATORY:                          10.8   9.10  )-----------( 450      ( 01 Oct 2022 06:42 )             34.5     10-01    133<L>  |  94<L>  |  16  ----------------------------<  335<H>  3.3<L>   |  33<H>  |  1.02    Ca    8.9      01 Oct 2022 06:42    TPro  7.7  /  Alb  3.0<L>  /  TBili  0.4  /  DBili  x   /  AST  18  /  ALT  29  /  AlkPhos  87  10-01      CAPILLARY BLOOD GLUCOSE  POCT Blood Glucose.: 404 mg/dL (01 Oct 2022 16:44)  POCT Blood Glucose.: 442 mg/dL (01 Oct 2022 12:08)  POCT Blood Glucose.: 350 mg/dL (01 Oct 2022 08:03)  POCT Blood Glucose.: 387 mg/dL (30 Sep 2022 21:41)    LIVER FUNCTIONS - ( 01 Oct 2022 06:42 )  Alb: 3.0 g/dL / Pro: 7.7 gm/dL / ALK PHOS: 87 U/L / ALT: 29 U/L / AST: 18 U/L / GGT: x             IMAGING:  < from: 12 Lead ECG (09.21.22 @ 22:58) >  *** Poor data quality, interpretation may be adversely affected  Normal sinus rhythm probably  T wave abnormality, consider inferior ischemia  Abnormal ECG  When compared with ECG of 27-JUL-2021 18:31,  Nonspecific T wave abnormality now evident in Inferior leads    < end of copied text >    < from: CT Angio Chest PE Protocol w/ IV Cont (09.23.22 @ 15:15) >  IMPRESSION:  No evidence of central or lobar pulmonary emboli.    A combination of groundglass opacity and patchy airspace   disease/atelectasis involving the lungs bilaterally along with trace to   small pleural effusions.    < end of copied text >    < from: TTE Echo Complete w/o Contrast w/ Doppler (09.28.22 @ 12:13) >  Summary:   1. Left ventricular ejection fraction, by visual estimation, is 60 to   65%.   2. Normal left ventricular size and wall thicknesses, with normal   systolic and diastolic function.   3. Normal trileaflet aortic valve with normal opening.   4. Estimated pulmonary artery systolic pressure is 44.2 mmHg assuming a   right atrial pressure of 8 mmHg, which is consistent with mild pulmonary   hypertension.    < end of copied text >    ASSESSMENT:  Fauzia is a pleasant 52-year-old female with history of chronic pain syndrome, diabetes, hypertension, morbid obesity, CAD, IBS, pseudotumor cerebri admitted with dyspnea and increasing lower extremity swelling with orthopnea.  Responsive to oxygen and diuretics.  No evidence of PE.  Likely element of heart failure preserved ejection fraction.    PLAN:     Continue negative fluid balance with diuretic therapy with Bumex 2 mg twice daily.  Fluid and sodium restriction emphasized.  Continue glycemic management of type 2 diabetes.  Stay on DVT prevention with heparin subcutaneously.  Continue pulmonary management.  Stay on spironolactone, nifedipine and lisinopril for blood pressure control.  Follow labs.  Eventual ischemic cardiac work-up.    Td Dozier MD, FACC, FASE, GARCÍANC, FACP  Director, Heart Failure Services  [facility]  , Department of Cardiology  [facility]

## 2022-10-01 NOTE — PROGRESS NOTE ADULT - SUBJECTIVE AND OBJECTIVE BOX
CHIEF COMPLAINT/INTERVAL HISTORY:    Patient is a 52y old  Female who presents with a chief complaint of dyspnea, orthopnea (01 Oct 2022 10:50)      HPI:  Patient is a 52F with a PMH of chronic pain syndrome (spinal nerve stimulator placed 07/2022, morbid obesity, DM, HTN, CAD, IBS, pseudotumor cerebri who presents to the ED for dyspnea.  Patient states that over the last 3 weeks she has had increased swelling in her lower extremities (worse on the R side) and has also developed dyspnea and orthopnea.  Patient reportedly requiring increased oxygen concentrations at the NH - as high as 10L/min?  Patient concerned as she was started on lasix at the NH however her urination has not increased.  Patient also reports 15 lb weight gain in the last two weeks.  Presented to ED for evaluation.  Patient SpO2 currently 97% on 4L via NC.  Labs show leukocytosis and hypokalemia.  Will admit to med surg   (22 Sep 2022 03:51)      SUBJECTIVE & OBJECTIVE: Pt seen and examined at bedside.   c/o back pain/ chronic  asking narcotic analgesics to be resumed  sob better  asking for 15 mg/ 30 mg oxy for mod to severe pain      Vital Signs Last 24 Hrs  T(C): 36.6 (01 Oct 2022 16:22), Max: 36.8 (01 Oct 2022 00:44)  T(F): 97.9 (01 Oct 2022 16:22), Max: 98.3 (01 Oct 2022 00:44)  HR: 70 (01 Oct 2022 16:22) (65 - 86)  BP: 135/75 (01 Oct 2022 16:22) (95/60 - 158/76)  BP(mean): --  ABP: --  ABP(mean): --  RR: 19 (01 Oct 2022 16:22) (18 - 19)  SpO2: 94% (01 Oct 2022 16:22) (94% - 100%)    O2 Parameters below as of 01 Oct 2022 05:18  Patient On (Oxygen Delivery Method): nasal cannula              MEDICATIONS  (STANDING):  buMETAnide Injectable 2 milliGRAM(s) IV Push two times a day  dextrose 5%. 1000 milliLiter(s) (50 mL/Hr) IV Continuous <Continuous>  dextrose 5%. 1000 milliLiter(s) (100 mL/Hr) IV Continuous <Continuous>  dextrose 50% Injectable 25 Gram(s) IV Push once  dextrose 50% Injectable 12.5 Gram(s) IV Push once  dextrose 50% Injectable 25 Gram(s) IV Push once  glucagon  Injectable 1 milliGRAM(s) IntraMuscular once  heparin   Injectable 7500 Unit(s) SubCutaneous every 12 hours  influenza   Vaccine 0.5 milliLiter(s) IntraMuscular once  insulin glargine Injectable (LANTUS) 20 Unit(s) SubCutaneous at bedtime  insulin lispro (ADMELOG) corrective regimen sliding scale   SubCutaneous at bedtime  insulin lispro (ADMELOG) corrective regimen sliding scale   SubCutaneous three times a day before meals  insulin lispro Injectable (ADMELOG) 4 Unit(s) SubCutaneous three times a day before meals  lisinopril 10 milliGRAM(s) Oral daily  NIFEdipine XL 60 milliGRAM(s) Oral daily  pantoprazole    Tablet 40 milliGRAM(s) Oral before breakfast  senna 2 Tablet(s) Oral at bedtime  sertraline 50 milliGRAM(s) Oral daily  spironolactone 25 milliGRAM(s) Oral daily  tiotropium 18 MICROgram(s) Capsule 1 Capsule(s) Inhalation daily    MEDICATIONS  (PRN):  baclofen 5 milliGRAM(s) Oral every 8 hours PRN Muscle Spasm  dextrose Oral Gel 15 Gram(s) Oral once PRN Blood Glucose LESS THAN 70 milliGRAM(s)/deciliter  melatonin 3 milliGRAM(s) Oral at bedtime PRN Sleep  ondansetron Injectable 4 milliGRAM(s) IV Push every 8 hours PRN Nausea and/or Vomiting  oxyCODONE    IR 15 milliGRAM(s) Oral every 4 hours PRN Severe Pain (7 - 10)  simethicone 80 milliGRAM(s) Chew three times a day PRN Gas        PHYSICAL EXAM:    GENERAL: NAD, mobidly obese  HEAD:  Atraumatic, Normocephalic  EYES: EOMI, PERRLA,  ENMT: Moist mucous membranes  NECK: Supple  NERVOUS SYSTEM:  Alert & Oriented X3, normal speech  CHEST/LUNG: bilateral air entry + rales basal lungs b/l   HEART: S1, S2  ABDOMEN: Soft, Nontender, obese; Bowel sounds present  EXTREMITIES: +++ b/l le edema    LABS:                        10.8   9.10  )-----------( 450      ( 01 Oct 2022 06:42 )             34.5     10-01    133<L>  |  94<L>  |  16  ----------------------------<  335<H>  3.3<L>   |  33<H>  |  1.02    Ca    8.9      01 Oct 2022 06:42    TPro  7.7  /  Alb  3.0<L>  /  TBili  0.4  /  DBili  x   /  AST  18  /  ALT  29  /  AlkPhos  87  10-01          CAPILLARY BLOOD GLUCOSE      POCT Blood Glucose.: 442 mg/dL (01 Oct 2022 12:08)  POCT Blood Glucose.: 350 mg/dL (01 Oct 2022 08:03)  POCT Blood Glucose.: 387 mg/dL (30 Sep 2022 21:41)  POCT Blood Glucose.: 370 mg/dL (30 Sep 2022 16:42)

## 2022-10-02 LAB
ANION GAP SERPL CALC-SCNC: 8 MMOL/L — SIGNIFICANT CHANGE UP (ref 5–17)
BUN SERPL-MCNC: 14 MG/DL — SIGNIFICANT CHANGE UP (ref 7–23)
CALCIUM SERPL-MCNC: 8.9 MG/DL — SIGNIFICANT CHANGE UP (ref 8.5–10.1)
CHLORIDE SERPL-SCNC: 95 MMOL/L — LOW (ref 96–108)
CO2 SERPL-SCNC: 31 MMOL/L — SIGNIFICANT CHANGE UP (ref 22–31)
CREAT SERPL-MCNC: 1.02 MG/DL — SIGNIFICANT CHANGE UP (ref 0.5–1.3)
EGFR: 66 ML/MIN/1.73M2 — SIGNIFICANT CHANGE UP
GLUCOSE BLDC GLUCOMTR-MCNC: 322 MG/DL — HIGH (ref 70–99)
GLUCOSE BLDC GLUCOMTR-MCNC: 385 MG/DL — HIGH (ref 70–99)
GLUCOSE BLDC GLUCOMTR-MCNC: 399 MG/DL — HIGH (ref 70–99)
GLUCOSE BLDC GLUCOMTR-MCNC: 430 MG/DL — HIGH (ref 70–99)
GLUCOSE SERPL-MCNC: 326 MG/DL — HIGH (ref 70–99)
HCT VFR BLD CALC: 35.7 % — SIGNIFICANT CHANGE UP (ref 34.5–45)
HGB BLD-MCNC: 11.2 G/DL — LOW (ref 11.5–15.5)
MAGNESIUM SERPL-MCNC: 1.4 MG/DL — LOW (ref 1.6–2.6)
MCHC RBC-ENTMCNC: 27.9 PG — SIGNIFICANT CHANGE UP (ref 27–34)
MCHC RBC-ENTMCNC: 31.4 G/DL — LOW (ref 32–36)
MCV RBC AUTO: 88.8 FL — SIGNIFICANT CHANGE UP (ref 80–100)
NRBC # BLD: 0 /100 WBCS — SIGNIFICANT CHANGE UP (ref 0–0)
PLATELET # BLD AUTO: 420 K/UL — HIGH (ref 150–400)
POTASSIUM SERPL-MCNC: 3.4 MMOL/L — LOW (ref 3.5–5.3)
POTASSIUM SERPL-SCNC: 3.4 MMOL/L — LOW (ref 3.5–5.3)
RBC # BLD: 4.02 M/UL — SIGNIFICANT CHANGE UP (ref 3.8–5.2)
RBC # FLD: 14.1 % — SIGNIFICANT CHANGE UP (ref 10.3–14.5)
SODIUM SERPL-SCNC: 134 MMOL/L — LOW (ref 135–145)
WBC # BLD: 8.12 K/UL — SIGNIFICANT CHANGE UP (ref 3.8–10.5)
WBC # FLD AUTO: 8.12 K/UL — SIGNIFICANT CHANGE UP (ref 3.8–10.5)

## 2022-10-02 PROCEDURE — 99233 SBSQ HOSP IP/OBS HIGH 50: CPT

## 2022-10-02 PROCEDURE — 99232 SBSQ HOSP IP/OBS MODERATE 35: CPT

## 2022-10-02 RX ORDER — POTASSIUM CHLORIDE 20 MEQ
40 PACKET (EA) ORAL ONCE
Refills: 0 | Status: COMPLETED | OUTPATIENT
Start: 2022-10-02 | End: 2022-10-02

## 2022-10-02 RX ADMIN — Medication 5: at 17:09

## 2022-10-02 RX ADMIN — Medication 8 UNIT(S): at 11:42

## 2022-10-02 RX ADMIN — PANTOPRAZOLE SODIUM 40 MILLIGRAM(S): 20 TABLET, DELAYED RELEASE ORAL at 05:56

## 2022-10-02 RX ADMIN — TIOTROPIUM BROMIDE 1 CAPSULE(S): 18 CAPSULE ORAL; RESPIRATORY (INHALATION) at 11:41

## 2022-10-02 RX ADMIN — HEPARIN SODIUM 7500 UNIT(S): 5000 INJECTION INTRAVENOUS; SUBCUTANEOUS at 17:06

## 2022-10-02 RX ADMIN — HEPARIN SODIUM 7500 UNIT(S): 5000 INJECTION INTRAVENOUS; SUBCUTANEOUS at 05:54

## 2022-10-02 RX ADMIN — BUMETANIDE 2 MILLIGRAM(S): 0.25 INJECTION INTRAMUSCULAR; INTRAVENOUS at 05:55

## 2022-10-02 RX ADMIN — LISINOPRIL 10 MILLIGRAM(S): 2.5 TABLET ORAL at 05:56

## 2022-10-02 RX ADMIN — Medication 8 UNIT(S): at 08:48

## 2022-10-02 RX ADMIN — Medication 40 MILLIEQUIVALENT(S): at 11:40

## 2022-10-02 RX ADMIN — BUMETANIDE 2 MILLIGRAM(S): 0.25 INJECTION INTRAMUSCULAR; INTRAVENOUS at 15:09

## 2022-10-02 RX ADMIN — OXYCODONE HYDROCHLORIDE 15 MILLIGRAM(S): 5 TABLET ORAL at 23:56

## 2022-10-02 RX ADMIN — SERTRALINE 50 MILLIGRAM(S): 25 TABLET, FILM COATED ORAL at 11:41

## 2022-10-02 RX ADMIN — OXYCODONE HYDROCHLORIDE 15 MILLIGRAM(S): 5 TABLET ORAL at 11:45

## 2022-10-02 RX ADMIN — Medication 60 MILLIGRAM(S): at 05:56

## 2022-10-02 RX ADMIN — Medication 5: at 11:41

## 2022-10-02 RX ADMIN — OXYCODONE HYDROCHLORIDE 15 MILLIGRAM(S): 5 TABLET ORAL at 11:11

## 2022-10-02 RX ADMIN — Medication 8: at 22:01

## 2022-10-02 RX ADMIN — Medication 8 UNIT(S): at 17:10

## 2022-10-02 RX ADMIN — INSULIN GLARGINE 20 UNIT(S): 100 INJECTION, SOLUTION SUBCUTANEOUS at 22:01

## 2022-10-02 RX ADMIN — Medication 4: at 08:46

## 2022-10-02 RX ADMIN — SPIRONOLACTONE 25 MILLIGRAM(S): 25 TABLET, FILM COATED ORAL at 05:55

## 2022-10-02 RX ADMIN — OXYCODONE HYDROCHLORIDE 15 MILLIGRAM(S): 5 TABLET ORAL at 17:05

## 2022-10-02 NOTE — PROGRESS NOTE ADULT - SUBJECTIVE AND OBJECTIVE BOX
Patient is a 52y old  Female who presents with a chief complaint of dyspnea, orthopnea (02 Oct 2022 15:34)      INTERVAL HPI/OVERNIGHT EVENTS:  Pt was seen and examined, no acute events.    MEDICATIONS  (STANDING):  buMETAnide Injectable 2 milliGRAM(s) IV Push two times a day  dextrose 5%. 1000 milliLiter(s) (100 mL/Hr) IV Continuous <Continuous>  dextrose 5%. 1000 milliLiter(s) (50 mL/Hr) IV Continuous <Continuous>  dextrose 50% Injectable 25 Gram(s) IV Push once  dextrose 50% Injectable 12.5 Gram(s) IV Push once  dextrose 50% Injectable 25 Gram(s) IV Push once  glucagon  Injectable 1 milliGRAM(s) IntraMuscular once  heparin   Injectable 7500 Unit(s) SubCutaneous every 12 hours  influenza   Vaccine 0.5 milliLiter(s) IntraMuscular once  insulin glargine Injectable (LANTUS) 20 Unit(s) SubCutaneous at bedtime  insulin lispro (ADMELOG) corrective regimen sliding scale   SubCutaneous at bedtime  insulin lispro (ADMELOG) corrective regimen sliding scale   SubCutaneous three times a day before meals  insulin lispro Injectable (ADMELOG) 8 Unit(s) SubCutaneous three times a day before meals  lisinopril 10 milliGRAM(s) Oral daily  NIFEdipine XL 60 milliGRAM(s) Oral daily  pantoprazole    Tablet 40 milliGRAM(s) Oral before breakfast  senna 2 Tablet(s) Oral at bedtime  sertraline 50 milliGRAM(s) Oral daily  spironolactone 25 milliGRAM(s) Oral daily  tiotropium 18 MICROgram(s) Capsule 1 Capsule(s) Inhalation daily    MEDICATIONS  (PRN):  baclofen 5 milliGRAM(s) Oral every 8 hours PRN Muscle Spasm  dextrose Oral Gel 15 Gram(s) Oral once PRN Blood Glucose LESS THAN 70 milliGRAM(s)/deciliter  melatonin 3 milliGRAM(s) Oral at bedtime PRN Sleep  ondansetron Injectable 4 milliGRAM(s) IV Push every 8 hours PRN Nausea and/or Vomiting  oxyCODONE    IR 15 milliGRAM(s) Oral every 4 hours PRN Severe Pain (7 - 10)  simethicone 80 milliGRAM(s) Chew three times a day PRN Gas      Allergies  amitriptyline (Other)      Vital Signs Last 24 Hrs  T(C): 36.7 (02 Oct 2022 16:11), Max: 36.7 (02 Oct 2022 00:00)  T(F): 98 (02 Oct 2022 16:11), Max: 98.1 (02 Oct 2022 00:00)  HR: 103 (02 Oct 2022 16:35) (67 - 103)  BP: 114/49 (02 Oct 2022 16:11) (114/49 - 150/89)  BP(mean): --  RR: 18 (02 Oct 2022 16:11) (18 - 19)  SpO2: 93% (02 Oct 2022 16:35) (92% - 100%)    Parameters below as of 02 Oct 2022 16:11  Patient On (Oxygen Delivery Method): nasal cannula        PHYSICAL EXAM:  GENERAL: NAD  HEAD:  Atraumatic  EYES: PERRLA  ENMT: Mouth moist  NECK: supple  NERVOUS SYSTEM:  Awake, alert  CHEST/LUNG: Diminished, on 2L NC  HEART: RRR, non tender  ABDOMEN: Soft, non tender  EXTREMITIES:  2+ edema B/L  SKIN: no Rash      LABS:                        11.2   8.12  )-----------( 420      ( 02 Oct 2022 05:52 )             35.7     10-02    134<L>  |  95<L>  |  14  ----------------------------<  326<H>  3.4<L>   |  31  |  1.02    Ca    8.9      02 Oct 2022 05:52  Mg     1.4     10-02    TPro  7.7  /  Alb  3.0<L>  /  TBili  0.4  /  DBili  x   /  AST  18  /  ALT  29  /  AlkPhos  87  10-01        CAPILLARY BLOOD GLUCOSE      POCT Blood Glucose.: 399 mg/dL (02 Oct 2022 17:08)  POCT Blood Glucose.: 385 mg/dL (02 Oct 2022 10:52)  POCT Blood Glucose.: 322 mg/dL (02 Oct 2022 07:51)  POCT Blood Glucose.: 423 mg/dL (01 Oct 2022 22:20)      RADIOLOGY & ADDITIONAL TESTS:    Imaging Personally Reviewed:  [ ] YES  [ ] NO    Consultant(s) Notes Reviewed:  [ ] YES  [ ] NO    Care Discussed with Consultants/Other Providers [ ] YES  [ ] NO

## 2022-10-02 NOTE — PROGRESS NOTE ADULT - ASSESSMENT
REVIEW OF SYMPTOMS      Able to give ROS  Yes     RELIABLE +/-   CONSTITUTIONAL Weakness Yes  Chills No   ENDOCRINE  No heat or cold intolerance    ALLERGY No hives  Sore throat No stridor  RESP Coughing blood no  Shortness of breath YES   NEURO No Headache  Confusion Pain neck No   CARDIAC No Chest pain No Palpitations   GI  Pain abdomen NO   Vomiting NO     NOTABLE FINDINGS    PHYSICAL EXAM    HEENT Unremarkable  atraumatic   RESP Fair air entry EXP prolonged    Harsh breath sound Resp distres mild   CARDIAC S1 S2 No S3     NO JVD    ABDOMEN SOFT BS PRESENT NOT DISTENDED No hepatosplenomegaly PEDAL EDEMA present No calf tenderness  NO rash       AGE/SEX.   52 f  DOA.  9/22/2022   CC .  9/22/2022 [place] [facility] with sob fluid retentn incr o2 requiremnt     RECENT HOSPITAL STAY.  7/10-7/23 [place] Fever cough sob  Rxed mf pneum needed intubatn ards then bpap   extubatd 7/17 needed proni     HOSPITAL COURSE.   Fever 9/23/2022  COPD  Obesity   RO DVR   RO OHS   RO HFPEF    PMH .  pmh Former smoker smoker 45 y quit 2013   pmh marijuana smoker   pmh DM2  pmh Hytn   pmh IBS   pmh CRPS (Complex regional pain syndrome)  pmh spinal nerve stimulatoir 7/8/2022   pms pseudotumor cerebri        GENERAL DATA .   GOC.   9/22/2022 full code     ALLGY.       amitriptiline                       WT.   9/22/2022 156        BMI.    9/22/2022 52                           ICU STAY.  none  COVID.  9/24/2022 scv2 (-)     BEST PRACTICE ISSUES.    HOB ELEVATN. Yes  DVT PPLX. 9/22/2022 hpsc 7500.2      EWING PPLX.   9/22/2022 protonix 40    INFN PPLX.    SP SW DAWIT.   7/19 FEES      DIET.  9/22/2022 cons carb                 VS/ PO/IO/ VENT/ DRIPS.  10/2/2022 afeb 95 110/40   10/2/2022 nc 95%    ASSESSMENT/RECOMMENDATIONS .   RESP.  SHORTNESS OF BREATH.  Shortness of breath is likely multifactorial.  She had recent ards sec aspn pneum  She has morbid obesity  bmi 52  She may have OHS   She has hfpef  She will need further eval as outpt including pfts and sleep study    COPD.  9/23/2022 spiriva   cont rx    DVT pplx   9/22/2022 Is on hpsc    RESP FAILURE.  9/24/2022 5a 12/5/.6 743/49/147  9/22/2022 CO2 is 27    Target po 90-95% pH 730 (+)  IS ON NOCT BPAP for  STAS   will need abg close to discharge to determine need for home nppv or this can be arranged as outpt at pulm office     RO PE.  D-dimr 9/22/2022 dd 461  ct ch 9/23 no pe  v duplx 9/23 (-)   no vte     PULM HYTN   9/30/2022 echo pasp 44 n l vs and d f  9/30/2022 ph may nbe sec ohs and stas   Needes outpt [place] of ph including rhc to see if she has precapillary ph    INFECTION.  W 9/22-9/23-9/24-9/30/2022 w 14- 10.0 - 9.9 - 9.3    ct ch 9/23 no pe ggo and patchy airspace opac atelectasis bl  and tr pl effs  flu ab 9/22/2022 (-)   rsv 9/22/2022 (-)   rvp 9/24 (-)   CT ch findings likely resolving ards which she  had 7/10-7/23 admission [facility]     CARDIAC.  CAD.  tr 9/22/2022 tr 15  mi ruld out    HYTN.  9/22 nifedipine 60    bp controlld     HFPEF.  echo 7/13/2022 hyperdynamic lvsf n rvsf n  echo 9/28/2022 ef 60% pasp 44  n lv syst and diast fn   bnp 9/22/2022 bnp 87   9/22/2022 lisinopril 10   9/25 spironolact 25   9/25/2022 bumetanide 2.2   Cardio felt that low bnp may be false negv sec obsity and female sex and prior tte may not be indicative of current status   Good response to diuretics makes lvot obstrn less likely    To repeat tte when more euvolemic and able to lay flat    GI.  no active issues    HEMAT.  Hb 9/22-9/24/2022 Hb 10 - 9.9  monitor     RENAL.  Na 9/22-9/28-10/1-10/2/2022 Na 145 - 137- 133 - 134  Cr 9/22-9/27-9/30/2022 Cr .8 - .9 - 1.1   moniytor    TIME SPENT   Over 25 minutes aggregate care time spent on encounter; activities included   direct patient care, counseling and/or coordinating care reviewing notes, lab data/ imaging , discussion with multidisciplinary team/ patient  /family and explaining in detail risks, benefits, alternatives  of the recommendations     MARIBEL KELLY 52 f 1970 9/22/2022 DR JUVENCIO COKER

## 2022-10-02 NOTE — PROGRESS NOTE ADULT - SUBJECTIVE AND OBJECTIVE BOX
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;intercostal neuralgia&quot;)      Cardiac abnormality  (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;cardiac event&quot;; is unclear on diagnosis; denies hx/o MI)      Diabetes      Essential hypertension      IBS (irritable bowel syndrome)      Complex regional pain syndrome type 1      History of cholecystectomy      History of hand surgery  (pt states she had 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12)          FAMILY HISTORY:  FH: HTN (hypertension)        Home Medications:  benzonatate 100 mg oral capsule: 1 cap(s) orally 3 times a day (22 Jul 2022 16:48)  furosemide 40 mg oral tablet: 1 tab(s) orally once a day (22 Jul 2022 16:48)  guaiFENesin 1200 mg oral tablet, extended release: 1 tab(s) orally every 12 hours (22 Jul 2022 16:48)  lisinopril 10 mg oral tablet: 1 tab(s) orally once a day (22 Jul 2022 16:48)  mupirocin 2% topical ointment: 1 application topically 2 times a day (22 Jul 2022 16:48)  naloxegol 25 mg oral tablet: 1 tab(s) orally once a day (22 Jul 2022 16:48)  NIFEdipine 60 mg oral tablet, extended release: 1 tab(s) orally once a day (22 Jul 2022 16:48)  NovoLOG 100 units/mL injectable solution: 10 unit(s) subcutaneous 3 times a day (with meals) (11 Jul 2022 01:34)  oxyCODONE 15 mg oral tablet: 1 tab(s) orally every 4 hours, As needed, Severe Pain (7 - 10) (22 Jul 2022 16:48)  pantoprazole 40 mg oral delayed release tablet: 1 tab(s) orally once a day (before a meal) (22 Jul 2022 16:48)  pregabalin 100 mg oral capsule: 1 cap(s) orally every 8 hours (22 Jul 2022 16:48)  sertraline 50 mg oral tablet: 1 tab(s) orally once a day (22 Jul 2022 16:48)  Tresiba 100 units/mL subcutaneous solution: 80 unit(s) subcutaneous every 72 hours (11 Jul 2022 01:22)      MEDICATIONS  (STANDING):  buMETAnide Injectable 2 milliGRAM(s) IV Push two times a day  dextrose 5%. 1000 milliLiter(s) (50 mL/Hr) IV Continuous <Continuous>  dextrose 5%. 1000 milliLiter(s) (100 mL/Hr) IV Continuous <Continuous>  dextrose 50% Injectable 25 Gram(s) IV Push once  dextrose 50% Injectable 12.5 Gram(s) IV Push once  dextrose 50% Injectable 25 Gram(s) IV Push once  glucagon  Injectable 1 milliGRAM(s) IntraMuscular once  heparin   Injectable 7500 Unit(s) SubCutaneous every 12 hours  influenza   Vaccine 0.5 milliLiter(s) IntraMuscular once  insulin glargine Injectable (LANTUS) 20 Unit(s) SubCutaneous at bedtime  insulin lispro (ADMELOG) corrective regimen sliding scale   SubCutaneous at bedtime  insulin lispro (ADMELOG) corrective regimen sliding scale   SubCutaneous three times a day before meals  insulin lispro Injectable (ADMELOG) 8 Unit(s) SubCutaneous three times a day before meals  lisinopril 10 milliGRAM(s) Oral daily  NIFEdipine XL 60 milliGRAM(s) Oral daily  pantoprazole    Tablet 40 milliGRAM(s) Oral before breakfast  potassium chloride    Tablet ER 40 milliEquivalent(s) Oral once  senna 2 Tablet(s) Oral at bedtime  sertraline 50 milliGRAM(s) Oral daily  spironolactone 25 milliGRAM(s) Oral daily  tiotropium 18 MICROgram(s) Capsule 1 Capsule(s) Inhalation daily    MEDICATIONS  (PRN):  baclofen 5 milliGRAM(s) Oral every 8 hours PRN Muscle Spasm  dextrose Oral Gel 15 Gram(s) Oral once PRN Blood Glucose LESS THAN 70 milliGRAM(s)/deciliter  melatonin 3 milliGRAM(s) Oral at bedtime PRN Sleep  ondansetron Injectable 4 milliGRAM(s) IV Push every 8 hours PRN Nausea and/or Vomiting  oxyCODONE    IR 15 milliGRAM(s) Oral every 4 hours PRN Severe Pain (7 - 10)  simethicone 80 milliGRAM(s) Chew three times a day PRN Gas      Diet, Consistent Carbohydrate w/Evening Snack:   1200mL Fluid Restriction ([id number])  Low Sodium  Liquid Protein Supplement     Qty per Day:  1 (09-27-22 @ 12:07) [Active]          Vital Signs Last 24 Hrs  T(C): 36.4 (02 Oct 2022 04:41), Max: 36.7 (02 Oct 2022 00:00)  T(F): 97.6 (02 Oct 2022 04:41), Max: 98.1 (02 Oct 2022 00:00)  HR: 89 (02 Oct 2022 08:19) (67 - 89)  BP: 150/89 (02 Oct 2022 04:41) (135/58 - 150/89)  BP(mean): --  RR: 18 (02 Oct 2022 04:41) (18 - 19)  SpO2: 92% (02 Oct 2022 08:19) (92% - 100%)    Parameters below as of 02 Oct 2022 01:03  Patient On (Oxygen Delivery Method): nasal cannula  O2 Flow (L/min): 2        10-01-22 @ 07:01  -  10-02-22 @ 07:00  --------------------------------------------------------  IN: 350 mL / OUT: 0 mL / NET: 350 mL              LABS:                        11.2   8.12  )-----------( 420      ( 02 Oct 2022 05:52 )             35.7     10-02    134<L>  |  95<L>  |  14  ----------------------------<  326<H>  3.4<L>   |  31  |  1.02    Ca    8.9      02 Oct 2022 05:52  Mg     1.4     10-02    TPro  7.7  /  Alb  3.0<L>  /  TBili  0.4  /  DBili  x   /  AST  18  /  ALT  29  /  AlkPhos  87  10-01              WBC:  WBC Count: 8.12 K/uL (10-02 @ 05:52)  WBC Count: 9.10 K/uL (10-01 @ 06:42)  WBC Count: 9.33 K/uL (09-30 @ 06:10)  WBC Count: 9.22 K/uL (09-29 @ 06:27)      MICROBIOLOGY:  RECENT CULTURES:                  Sodium:  Sodium, Serum: 134 mmol/L (10-02 @ 05:52)  Sodium, Serum: 133 mmol/L (10-01 @ 06:42)  Sodium, Serum: 137 mmol/L (09-30 @ 06:10)  Sodium, Serum: 138 mmol/L (09-29 @ 06:27)      1.02 mg/dL 10-02 @ 05:52  1.02 mg/dL 10-01 @ 06:42  1.13 mg/dL 09-30 @ 06:10  1.03 mg/dL 09-29 @ 06:27      Hemoglobin:  Hemoglobin: 11.2 g/dL (10-02 @ 05:52)  Hemoglobin: 10.8 g/dL (10-01 @ 06:42)  Hemoglobin: 10.4 g/dL (09-30 @ 06:10)  Hemoglobin: 10.1 g/dL (09-29 @ 06:27)      Platelets: Platelet Count - Automated: 420 K/uL (10-02 @ 05:52)  Platelet Count - Automated: 450 K/uL (10-01 @ 06:42)  Platelet Count - Automated: 447 K/uL (09-30 @ 06:10)  Platelet Count - Automated: 404 K/uL (09-29 @ 06:27)      LIVER FUNCTIONS - ( 01 Oct 2022 06:42 )  Alb: 3.0 g/dL / Pro: 7.7 gm/dL / ALK PHOS: 87 U/L / ALT: 29 U/L / AST: 18 U/L / GGT: x                 RADIOLOGY & ADDITIONAL STUDIES:      MICROBIOLOGY:  RECENT CULTURES:

## 2022-10-02 NOTE — PROGRESS NOTE ADULT - SUBJECTIVE AND OBJECTIVE BOX
CHIEF COMPLAINT:  Patient is a 52y old  Female who presents with a chief complaint of dyspnea, orthopnea (01 Oct 2022 16:25)      HPI:  Fauzia is a pleasant 52-year-old female with history of chronic pain syndrome, diabetes, hypertension, morbid obesity, CAD, IBS, pseudotumor cerebri admitted with dyspnea and increasing lower extremity swelling with orthopnea.  Responsive to oxygen and diuretics.  No evidence of PE.  Likely element of heart failure preserved ejection fraction.    REVIEW OF SYSTEMS:  General:  No wt loss, fevers, chills, night sweats  Eyes:  Good vision, no reported pain  ENT:  No sore throat, pain, runny nose, dysphagia  CV:  No pain, palpitations, hypo/hypertension  Resp:  No dyspnea, cough, tachypnea, wheezing  GI:  No pain, nausea, vomiting, diarrhea, constipation  :  No pain, bleeding, incontinence, nocturia  Muscle:  No pain, weakness  Breast:  No pain, abscess, mass, discharge  Neuro:  No weakness, tingling, memory problems  Psych:  No fatigue, insomnia, mood problems, depression  Endocrine:  No polyuria, polydipsia, cold/heat intolerance  Heme:  No petechiae, ecchymosis, easy bruisability  Skin:  No rash, edema    PHYSICAL EXAM:  Vital Signs Last 24 Hrs  T(C): 36.4 (02 Oct 2022 04:41), Max: 36.7 (02 Oct 2022 00:00)  T(F): 97.6 (02 Oct 2022 04:41), Max: 98.1 (02 Oct 2022 00:00)  HR: 89 (02 Oct 2022 08:19) (67 - 89)  BP: 150/89 (02 Oct 2022 04:41) (135/58 - 150/89)  RR: 18 (02 Oct 2022 04:41) (18 - 19)  SpO2: 92% (02 Oct 2022 08:19) (92% - 100%)    Parameters below as of 02 Oct 2022 01:03  Patient On (Oxygen Delivery Method): nasal cannula  O2 Flow (L/min): 2        Tele: SR    Constitutional: well developed, normal appearance, well groomed, well nourished, no deformities and no acute distress.   Eyes: the conjunctiva exhibited no abnormalities and the eyelids demonstrated no xanthelasmas.   HEENT: normal oral mucosa, no oral pallor and no oral cyanosis.   Neck: normal jugular venous A waves present, normal jugular venous V waves present and no jugular venous malcolm A waves.   Pulmonary: no respiratory distress, normal respiratory rhythm and effort, no accessory muscle use and lungs were clear to auscultation bilaterally.   Cardiovascular: heart rate and rhythm were normal, normal S1 and S2 and no murmur, gallop, rub, heave or thrill are present.   Abdomen: soft, non-tender, no hepato-splenomegaly and no abdominal mass palpated.   Musculoskeletal: the gait could not be assessed.   Extremities: no clubbing of the fingernails, no localized cyanosis, no petechial hemorrhages and no ischemic changes.   Skin: normal skin color and pigmentation, no rash, no venous stasis, no skin lesions, no skin ulcer and no xanthoma was observed.   Psychiatric: oriented to person, place, and time, the affect was normal, the mood was normal and not feeling anxious.      LABORATORY:             11.2   8.12  )-----------( 420      ( 02 Oct 2022 05:52 )             35.7     10-02    134<L>  |  95<L>  |  14  ----------------------------<  326<H>  3.4<L>   |  31  |  1.02    Ca    8.9      02 Oct 2022 05:52  Mg     1.4     10-02    TPro  7.7  /  Alb  3.0<L>  /  TBili  0.4  /  DBili  x   /  AST  18  /  ALT  29  /  AlkPhos  87  10-01      IMAGING:  < from: 12 Lead ECG (09.21.22 @ 22:58) >  *** Poor data quality, interpretation may be adversely affected  Normal sinus rhythm probably  T wave abnormality, consider inferior ischemia  Abnormal ECG  When compared with ECG of 27-JUL-2021 18:31,  Nonspecific T wave abnormality now evident in Inferior leads    < end of copied text >    < from: CT Angio Chest PE Protocol w/ IV Cont (09.23.22 @ 15:15) >  IMPRESSION:  No evidence of central or lobar pulmonary emboli.    A combination of groundglass opacity and patchy airspace   disease/atelectasis involving the lungs bilaterally along with trace to   small pleural effusions.    < end of copied text >    < from: TTE Echo Complete w/o Contrast w/ Doppler (09.28.22 @ 12:13) >  Summary:   1. Left ventricular ejection fraction, by visual estimation, is 60 to   65%.   2. Normal left ventricular size and wall thicknesses, with normal   systolic and diastolic function.   3. Normal trileaflet aortic valve with normal opening.   4. Estimated pulmonary artery systolic pressure is 44.2 mmHg assuming a   right atrial pressure of 8 mmHg, which is consistent with mild pulmonary   hypertension.    < end of copied text >    ASSESSMENT:  Fauzia is a pleasant 52-year-old female with history of chronic pain syndrome, diabetes, hypertension, morbid obesity, CAD, IBS, pseudotumor cerebri admitted with dyspnea and increasing lower extremity swelling with orthopnea.  Responsive to oxygen and diuretics.  No evidence of PE.  Likely element of heart failure preserved ejection fraction.    PLAN:     Continue negative fluid balance with diuretic therapy with Bumex 2 mg twice daily.  Fluid and sodium restriction emphasized.  Continue glycemic management of type 2 diabetes.  Stay on DVT prevention with heparin subcutaneously.  Continue pulmonary management.  Stay on spironolactone, nifedipine and lisinopril for blood pressure control.  Follow labs.  Eventual ischemic cardiac work-up.    MEDICATIONS  (STANDING):  buMETAnide Injectable 2 milliGRAM(s) IV Push two times a day  dextrose 5%. 1000 milliLiter(s) (100 mL/Hr) IV Continuous <Continuous>  dextrose 5%. 1000 milliLiter(s) (50 mL/Hr) IV Continuous <Continuous>  dextrose 50% Injectable 25 Gram(s) IV Push once  dextrose 50% Injectable 12.5 Gram(s) IV Push once  dextrose 50% Injectable 25 Gram(s) IV Push once  glucagon  Injectable 1 milliGRAM(s) IntraMuscular once  heparin   Injectable 7500 Unit(s) SubCutaneous every 12 hours  influenza   Vaccine 0.5 milliLiter(s) IntraMuscular once  insulin glargine Injectable (LANTUS) 20 Unit(s) SubCutaneous at bedtime  insulin lispro (ADMELOG) corrective regimen sliding scale   SubCutaneous at bedtime  insulin lispro (ADMELOG) corrective regimen sliding scale   SubCutaneous three times a day before meals  insulin lispro Injectable (ADMELOG) 8 Unit(s) SubCutaneous three times a day before meals  lisinopril 10 milliGRAM(s) Oral daily  NIFEdipine XL 60 milliGRAM(s) Oral daily  pantoprazole    Tablet 40 milliGRAM(s) Oral before breakfast  senna 2 Tablet(s) Oral at bedtime  sertraline 50 milliGRAM(s) Oral daily  spironolactone 25 milliGRAM(s) Oral daily  tiotropium 18 MICROgram(s) Capsule 1 Capsule(s) Inhalation daily    Td Dozier MD, FACC, FASE, FASNC, FACP  Director, Heart Failure Services  [facility]  , Department of Cardiology  [facility]     CHIEF COMPLAINT:  Patient is a 52y old  Female who presents with a chief complaint of dyspnea, orthopnea (01 Oct 2022 16:25)      HPI:  Fauzia is a pleasant 52-year-old female with history of chronic pain syndrome, diabetes, hypertension, morbid obesity, CAD, IBS, pseudotumor cerebri admitted with dyspnea and increasing lower extremity swelling with orthopnea.  Responsive to oxygen and diuretics.  No evidence of PE.  Likely element of heart failure preserved ejection fraction. Resting comfortably today in bed with less shortness of breath and ankle edema noted.    REVIEW OF SYSTEMS:  General:  No wt loss, fevers, chills, night sweats  Eyes:  Good vision, no reported pain  ENT:  No sore throat, pain, runny nose, dysphagia  CV:  No pain, palpitations, hypo/hypertension  Resp:  No dyspnea, cough, tachypnea, wheezing  GI:  No pain, nausea, vomiting, diarrhea, constipation  :  No pain, bleeding, incontinence, nocturia  Muscle:  No pain, weakness  Breast:  No pain, abscess, mass, discharge  Neuro:  No weakness, tingling, memory problems  Psych:  No fatigue, insomnia, mood problems, depression  Endocrine:  No polyuria, polydipsia, cold/heat intolerance  Heme:  No petechiae, ecchymosis, easy bruisability  Skin:  No rash, edema    PHYSICAL EXAM:  Vital Signs Last 24 Hrs  T(C): 36.4 (02 Oct 2022 04:41), Max: 36.7 (02 Oct 2022 00:00)  T(F): 97.6 (02 Oct 2022 04:41), Max: 98.1 (02 Oct 2022 00:00)  HR: 89 (02 Oct 2022 08:19) (67 - 89)  BP: 150/89 (02 Oct 2022 04:41) (135/58 - 150/89)  RR: 18 (02 Oct 2022 04:41) (18 - 19)  SpO2: 92% (02 Oct 2022 08:19) (92% - 100%)    Parameters below as of 02 Oct 2022 01:03  Patient On (Oxygen Delivery Method): nasal cannula  O2 Flow (L/min): 2      Constitutional: well developed, normal appearance, well groomed, well nourished, no deformities and no acute distress.   Eyes: the conjunctiva exhibited no abnormalities and the eyelids demonstrated no xanthelasmas.   HEENT: normal oral mucosa, no oral pallor and no oral cyanosis.   Neck: normal jugular venous A waves present, normal jugular venous V waves present and no jugular venous malcolm A waves.   Pulmonary: no respiratory distress, normal respiratory rhythm and effort, no accessory muscle use and lungs were clear to auscultation bilaterally.   Cardiovascular: heart rate and rhythm were normal, normal S1 and S2 and no murmur, gallop, rub, heave or thrill are present.   Abdomen: soft, non-tender, obese.  Musculoskeletal: the gait could not be assessed.   Extremities: no clubbing of the fingernails, no localized cyanosis, no petechial hemorrhages and no ischemic changes. +1 bilateral ankle and leg edema.  Skin: normal skin color and pigmentation, no rash, no venous stasis, no skin lesions, no skin ulcer and no xanthoma was observed.   Psychiatric: oriented to person, place, and time, the affect was normal, the mood was normal and not feeling anxious.      LABORATORY:             11.2   8.12  )-----------( 420      ( 02 Oct 2022 05:52 )             35.7     10-02    134<L>  |  95<L>  |  14  ----------------------------<  326<H>  3.4<L>   |  31  |  1.02    Ca    8.9      02 Oct 2022 05:52  Mg     1.4     10-02    TPro  7.7  /  Alb  3.0<L>  /  TBili  0.4  /  DBili  x   /  AST  18  /  ALT  29  /  AlkPhos  87  10-01      IMAGING:  < from: 12 Lead ECG (09.21.22 @ 22:58) >  *** Poor data quality, interpretation may be adversely affected  Normal sinus rhythm probably  T wave abnormality, consider inferior ischemia  Abnormal ECG  When compared with ECG of 27-JUL-2021 18:31,  Nonspecific T wave abnormality now evident in Inferior leads    < end of copied text >    < from: CT Angio Chest PE Protocol w/ IV Cont (09.23.22 @ 15:15) >  IMPRESSION:  No evidence of central or lobar pulmonary emboli.    A combination of groundglass opacity and patchy airspace   disease/atelectasis involving the lungs bilaterally along with trace to   small pleural effusions.    < end of copied text >    < from: TTE Echo Complete w/o Contrast w/ Doppler (09.28.22 @ 12:13) >  Summary:   1. Left ventricular ejection fraction, by visual estimation, is 60 to   65%.   2. Normal left ventricular size and wall thicknesses, with normal   systolic and diastolic function.   3. Normal trileaflet aortic valve with normal opening.   4. Estimated pulmonary artery systolic pressure is 44.2 mmHg assuming a   right atrial pressure of 8 mmHg, which is consistent with mild pulmonary   hypertension.    < end of copied text >    ASSESSMENT:  Fauzia is a pleasant 52-year-old female with history of chronic pain syndrome, diabetes, hypertension, morbid obesity, CAD, IBS, pseudotumor cerebri admitted with dyspnea and increasing lower extremity swelling with orthopnea.  Responsive to oxygen and diuretics.  No evidence of PE.  Likely element of heart failure preserved ejection fraction. Resting comfortably today in bed with less shortness of breath and ankle edema noted.    PLAN:     Stay on Bumex 2 mg IV push twice daily with spironolactone 25 mg daily to help maintain negative fluid balance with fluid and sodium restriction again emphasized.  Stay on glycemic management of type 2 diabetes.  Continue nifedipine and lisinopril for blood pressure control and DVT prevention with heparin.  Maintain pulmonary medications as well. Will eventually need ischemic evaluation with likely cardiac catheterization when more euvolemic.    Td Dozier MD, FACC, AYANNA RAYMUNDO, FACP  Director, Heart Failure Services  [facility]  , Department of Cardiology  [facility]

## 2022-10-03 LAB
ALBUMIN SERPL ELPH-MCNC: 3.3 G/DL — SIGNIFICANT CHANGE UP (ref 3.3–5)
ALP SERPL-CCNC: 94 U/L — SIGNIFICANT CHANGE UP (ref 40–120)
ALT FLD-CCNC: 27 U/L — SIGNIFICANT CHANGE UP (ref 12–78)
ANION GAP SERPL CALC-SCNC: 8 MMOL/L — SIGNIFICANT CHANGE UP (ref 5–17)
AST SERPL-CCNC: 19 U/L — SIGNIFICANT CHANGE UP (ref 15–37)
BILIRUB SERPL-MCNC: 0.5 MG/DL — SIGNIFICANT CHANGE UP (ref 0.2–1.2)
BUN SERPL-MCNC: 16 MG/DL — SIGNIFICANT CHANGE UP (ref 7–23)
CALCIUM SERPL-MCNC: 9 MG/DL — SIGNIFICANT CHANGE UP (ref 8.5–10.1)
CHLORIDE SERPL-SCNC: 93 MMOL/L — LOW (ref 96–108)
CO2 SERPL-SCNC: 32 MMOL/L — HIGH (ref 22–31)
CREAT SERPL-MCNC: 1.08 MG/DL — SIGNIFICANT CHANGE UP (ref 0.5–1.3)
EGFR: 62 ML/MIN/1.73M2 — SIGNIFICANT CHANGE UP
FLUAV AG NPH QL: SIGNIFICANT CHANGE UP
FLUBV AG NPH QL: SIGNIFICANT CHANGE UP
GLUCOSE BLDC GLUCOMTR-MCNC: 364 MG/DL — HIGH (ref 70–99)
GLUCOSE BLDC GLUCOMTR-MCNC: 370 MG/DL — HIGH (ref 70–99)
GLUCOSE BLDC GLUCOMTR-MCNC: 377 MG/DL — HIGH (ref 70–99)
GLUCOSE BLDC GLUCOMTR-MCNC: 381 MG/DL — HIGH (ref 70–99)
GLUCOSE BLDC GLUCOMTR-MCNC: 395 MG/DL — HIGH (ref 70–99)
GLUCOSE SERPL-MCNC: 373 MG/DL — HIGH (ref 70–99)
HCT VFR BLD CALC: 35.9 % — SIGNIFICANT CHANGE UP (ref 34.5–45)
HGB BLD-MCNC: 11.3 G/DL — LOW (ref 11.5–15.5)
MAGNESIUM SERPL-MCNC: 1.3 MG/DL — LOW (ref 1.6–2.6)
MCHC RBC-ENTMCNC: 27.2 PG — SIGNIFICANT CHANGE UP (ref 27–34)
MCHC RBC-ENTMCNC: 31.5 G/DL — LOW (ref 32–36)
MCV RBC AUTO: 86.5 FL — SIGNIFICANT CHANGE UP (ref 80–100)
NRBC # BLD: 0 /100 WBCS — SIGNIFICANT CHANGE UP (ref 0–0)
PLATELET # BLD AUTO: 456 K/UL — HIGH (ref 150–400)
POTASSIUM SERPL-MCNC: 3.4 MMOL/L — LOW (ref 3.5–5.3)
POTASSIUM SERPL-SCNC: 3.4 MMOL/L — LOW (ref 3.5–5.3)
PROT SERPL-MCNC: 8.2 GM/DL — SIGNIFICANT CHANGE UP (ref 6–8.3)
RBC # BLD: 4.15 M/UL — SIGNIFICANT CHANGE UP (ref 3.8–5.2)
RBC # FLD: 14.1 % — SIGNIFICANT CHANGE UP (ref 10.3–14.5)
SARS-COV-2 RNA SPEC QL NAA+PROBE: SIGNIFICANT CHANGE UP
SODIUM SERPL-SCNC: 133 MMOL/L — LOW (ref 135–145)
WBC # BLD: 8.62 K/UL — SIGNIFICANT CHANGE UP (ref 3.8–10.5)
WBC # FLD AUTO: 8.62 K/UL — SIGNIFICANT CHANGE UP (ref 3.8–10.5)

## 2022-10-03 PROCEDURE — 99232 SBSQ HOSP IP/OBS MODERATE 35: CPT

## 2022-10-03 PROCEDURE — 99233 SBSQ HOSP IP/OBS HIGH 50: CPT | Mod: FS

## 2022-10-03 RX ORDER — POTASSIUM CHLORIDE 20 MEQ
40 PACKET (EA) ORAL EVERY 4 HOURS
Refills: 0 | Status: COMPLETED | OUTPATIENT
Start: 2022-10-03 | End: 2022-10-04

## 2022-10-03 RX ORDER — INSULIN LISPRO 100/ML
12 VIAL (ML) SUBCUTANEOUS
Refills: 0 | Status: DISCONTINUED | OUTPATIENT
Start: 2022-10-03 | End: 2022-10-04

## 2022-10-03 RX ORDER — CARVEDILOL PHOSPHATE 80 MG/1
3.12 CAPSULE, EXTENDED RELEASE ORAL EVERY 12 HOURS
Refills: 0 | Status: DISCONTINUED | OUTPATIENT
Start: 2022-10-03 | End: 2022-10-04

## 2022-10-03 RX ORDER — BUMETANIDE 0.25 MG/ML
2 INJECTION INTRAMUSCULAR; INTRAVENOUS DAILY
Refills: 0 | Status: DISCONTINUED | OUTPATIENT
Start: 2022-10-03 | End: 2022-10-04

## 2022-10-03 RX ORDER — INSULIN GLARGINE 100 [IU]/ML
25 INJECTION, SOLUTION SUBCUTANEOUS AT BEDTIME
Refills: 0 | Status: DISCONTINUED | OUTPATIENT
Start: 2022-10-03 | End: 2022-10-04

## 2022-10-03 RX ORDER — MAGNESIUM SULFATE 500 MG/ML
2 VIAL (ML) INJECTION ONCE
Refills: 0 | Status: COMPLETED | OUTPATIENT
Start: 2022-10-03 | End: 2022-10-03

## 2022-10-03 RX ADMIN — OXYCODONE HYDROCHLORIDE 15 MILLIGRAM(S): 5 TABLET ORAL at 19:22

## 2022-10-03 RX ADMIN — LISINOPRIL 10 MILLIGRAM(S): 2.5 TABLET ORAL at 05:46

## 2022-10-03 RX ADMIN — Medication 5: at 12:34

## 2022-10-03 RX ADMIN — Medication 5: at 08:13

## 2022-10-03 RX ADMIN — Medication 12 UNIT(S): at 18:14

## 2022-10-03 RX ADMIN — TIOTROPIUM BROMIDE 1 CAPSULE(S): 18 CAPSULE ORAL; RESPIRATORY (INHALATION) at 12:35

## 2022-10-03 RX ADMIN — Medication 5: at 18:15

## 2022-10-03 RX ADMIN — Medication 6: at 21:47

## 2022-10-03 RX ADMIN — Medication 40 MILLIEQUIVALENT(S): at 21:47

## 2022-10-03 RX ADMIN — Medication 25 GRAM(S): at 17:13

## 2022-10-03 RX ADMIN — OXYCODONE HYDROCHLORIDE 15 MILLIGRAM(S): 5 TABLET ORAL at 18:27

## 2022-10-03 RX ADMIN — HEPARIN SODIUM 7500 UNIT(S): 5000 INJECTION INTRAVENOUS; SUBCUTANEOUS at 18:14

## 2022-10-03 RX ADMIN — Medication 8 UNIT(S): at 08:12

## 2022-10-03 RX ADMIN — Medication 8 UNIT(S): at 12:34

## 2022-10-03 RX ADMIN — SPIRONOLACTONE 25 MILLIGRAM(S): 25 TABLET, FILM COATED ORAL at 05:46

## 2022-10-03 RX ADMIN — Medication 5 MILLIGRAM(S): at 22:17

## 2022-10-03 RX ADMIN — INSULIN GLARGINE 25 UNIT(S): 100 INJECTION, SOLUTION SUBCUTANEOUS at 21:46

## 2022-10-03 RX ADMIN — BUMETANIDE 2 MILLIGRAM(S): 0.25 INJECTION INTRAMUSCULAR; INTRAVENOUS at 05:43

## 2022-10-03 RX ADMIN — SERTRALINE 50 MILLIGRAM(S): 25 TABLET, FILM COATED ORAL at 12:35

## 2022-10-03 RX ADMIN — BUMETANIDE 2 MILLIGRAM(S): 0.25 INJECTION INTRAMUSCULAR; INTRAVENOUS at 14:23

## 2022-10-03 RX ADMIN — OXYCODONE HYDROCHLORIDE 15 MILLIGRAM(S): 5 TABLET ORAL at 00:56

## 2022-10-03 RX ADMIN — HEPARIN SODIUM 7500 UNIT(S): 5000 INJECTION INTRAVENOUS; SUBCUTANEOUS at 05:43

## 2022-10-03 RX ADMIN — Medication 40 MILLIEQUIVALENT(S): at 17:13

## 2022-10-03 RX ADMIN — PANTOPRAZOLE SODIUM 40 MILLIGRAM(S): 20 TABLET, DELAYED RELEASE ORAL at 05:47

## 2022-10-03 RX ADMIN — OXYCODONE HYDROCHLORIDE 15 MILLIGRAM(S): 5 TABLET ORAL at 14:00

## 2022-10-03 RX ADMIN — Medication 3 MILLIGRAM(S): at 00:40

## 2022-10-03 RX ADMIN — OXYCODONE HYDROCHLORIDE 15 MILLIGRAM(S): 5 TABLET ORAL at 13:30

## 2022-10-03 NOTE — PROGRESS NOTE ADULT - PROBLEM SELECTOR PROBLEM 6
Chronic anxiety

## 2022-10-03 NOTE — PROGRESS NOTE ADULT - SUBJECTIVE AND OBJECTIVE BOX
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;intercostal neuralgia&quot;)      Cardiac abnormality  (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;cardiac event&quot;; is unclear on diagnosis; denies hx/o MI)      Diabetes      Essential hypertension      IBS (irritable bowel syndrome)      Complex regional pain syndrome type 1      History of cholecystectomy      History of hand surgery  (pt states she had 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12)          FAMILY HISTORY:  FH: HTN (hypertension)        Home Medications:  benzonatate 100 mg oral capsule: 1 cap(s) orally 3 times a day (22 Jul 2022 16:48)  furosemide 40 mg oral tablet: 1 tab(s) orally once a day (22 Jul 2022 16:48)  guaiFENesin 1200 mg oral tablet, extended release: 1 tab(s) orally every 12 hours (22 Jul 2022 16:48)  lisinopril 10 mg oral tablet: 1 tab(s) orally once a day (22 Jul 2022 16:48)  mupirocin 2% topical ointment: 1 application topically 2 times a day (22 Jul 2022 16:48)  naloxegol 25 mg oral tablet: 1 tab(s) orally once a day (22 Jul 2022 16:48)  NIFEdipine 60 mg oral tablet, extended release: 1 tab(s) orally once a day (22 Jul 2022 16:48)  NovoLOG 100 units/mL injectable solution: 10 unit(s) subcutaneous 3 times a day (with meals) (11 Jul 2022 01:34)  oxyCODONE 15 mg oral tablet: 1 tab(s) orally every 4 hours, As needed, Severe Pain (7 - 10) (22 Jul 2022 16:48)  pantoprazole 40 mg oral delayed release tablet: 1 tab(s) orally once a day (before a meal) (22 Jul 2022 16:48)  pregabalin 100 mg oral capsule: 1 cap(s) orally every 8 hours (22 Jul 2022 16:48)  sertraline 50 mg oral tablet: 1 tab(s) orally once a day (22 Jul 2022 16:48)  Tresiba 100 units/mL subcutaneous solution: 80 unit(s) subcutaneous every 72 hours (11 Jul 2022 01:22)      MEDICATIONS  (STANDING):  buMETAnide Injectable 2 milliGRAM(s) IV Push two times a day  dextrose 5%. 1000 milliLiter(s) (50 mL/Hr) IV Continuous <Continuous>  dextrose 5%. 1000 milliLiter(s) (100 mL/Hr) IV Continuous <Continuous>  dextrose 50% Injectable 25 Gram(s) IV Push once  dextrose 50% Injectable 12.5 Gram(s) IV Push once  dextrose 50% Injectable 25 Gram(s) IV Push once  glucagon  Injectable 1 milliGRAM(s) IntraMuscular once  heparin   Injectable 7500 Unit(s) SubCutaneous every 12 hours  influenza   Vaccine 0.5 milliLiter(s) IntraMuscular once  insulin glargine Injectable (LANTUS) 20 Unit(s) SubCutaneous at bedtime  insulin lispro (ADMELOG) corrective regimen sliding scale   SubCutaneous at bedtime  insulin lispro (ADMELOG) corrective regimen sliding scale   SubCutaneous three times a day before meals  insulin lispro Injectable (ADMELOG) 8 Unit(s) SubCutaneous three times a day before meals  lisinopril 10 milliGRAM(s) Oral daily  NIFEdipine XL 60 milliGRAM(s) Oral daily  pantoprazole    Tablet 40 milliGRAM(s) Oral before breakfast  senna 2 Tablet(s) Oral at bedtime  sertraline 50 milliGRAM(s) Oral daily  spironolactone 25 milliGRAM(s) Oral daily  tiotropium 18 MICROgram(s) Capsule 1 Capsule(s) Inhalation daily    MEDICATIONS  (PRN):  baclofen 5 milliGRAM(s) Oral every 8 hours PRN Muscle Spasm  dextrose Oral Gel 15 Gram(s) Oral once PRN Blood Glucose LESS THAN 70 milliGRAM(s)/deciliter  melatonin 3 milliGRAM(s) Oral at bedtime PRN Sleep  ondansetron Injectable 4 milliGRAM(s) IV Push every 8 hours PRN Nausea and/or Vomiting  oxyCODONE    IR 15 milliGRAM(s) Oral every 4 hours PRN Severe Pain (7 - 10)  simethicone 80 milliGRAM(s) Chew three times a day PRN Gas      Diet, Consistent Carbohydrate w/Evening Snack:   1200mL Fluid Restriction ([id number])  Low Sodium  Liquid Protein Supplement     Qty per Day:  1 (09-27-22 @ 12:07) [Active]          Vital Signs Last 24 Hrs  T(C): 36.6 (03 Oct 2022 04:44), Max: 36.8 (02 Oct 2022 23:45)  T(F): 97.8 (03 Oct 2022 04:44), Max: 98.2 (02 Oct 2022 23:45)  HR: 95 (03 Oct 2022 09:51) (70 - 106)  BP: 122/60 (03 Oct 2022 04:44) (114/49 - 122/60)  BP(mean): --  RR: 19 (03 Oct 2022 04:44) (18 - 19)  SpO2: 95% (03 Oct 2022 09:51) (93% - 99%)    Parameters below as of 03 Oct 2022 04:44  Patient On (Oxygen Delivery Method): BiPAP/CPAP                  LABS:                        11.2   8.12  )-----------( 420      ( 02 Oct 2022 05:52 )             35.7     10-02    134<L>  |  95<L>  |  14  ----------------------------<  326<H>  3.4<L>   |  31  |  1.02    Ca    8.9      02 Oct 2022 05:52  Mg     1.4     10-02                WBC:  WBC Count: 8.12 K/uL (10-02 @ 05:52)  WBC Count: 9.10 K/uL (10-01 @ 06:42)  WBC Count: 9.33 K/uL (09-30 @ 06:10)      MICROBIOLOGY:  RECENT CULTURES:                  Sodium:  Sodium, Serum: 134 mmol/L (10-02 @ 05:52)  Sodium, Serum: 133 mmol/L (10-01 @ 06:42)  Sodium, Serum: 137 mmol/L (09-30 @ 06:10)      1.02 mg/dL 10-02 @ 05:52  1.02 mg/dL 10-01 @ 06:42  1.13 mg/dL 09-30 @ 06:10      Hemoglobin:  Hemoglobin: 11.2 g/dL (10-02 @ 05:52)  Hemoglobin: 10.8 g/dL (10-01 @ 06:42)  Hemoglobin: 10.4 g/dL (09-30 @ 06:10)      Platelets: Platelet Count - Automated: 420 K/uL (10-02 @ 05:52)  Platelet Count - Automated: 450 K/uL (10-01 @ 06:42)  Platelet Count - Automated: 447 K/uL (09-30 @ 06:10)              RADIOLOGY & ADDITIONAL STUDIES:      MICROBIOLOGY:  RECENT CULTURES:

## 2022-10-03 NOTE — PROGRESS NOTE ADULT - PROBLEM SELECTOR PROBLEM 2
Leg swelling

## 2022-10-03 NOTE — PROGRESS NOTE ADULT - PROBLEM SELECTOR PLAN 8
appreciate nutrition
seen by RD  calorie restricted diet
seen by RD  calorie restricted diet
appreciate nutrition
seen by RD  calorie restricted diet
appreciate nutrition

## 2022-10-03 NOTE — PROGRESS NOTE ADULT - SUBJECTIVE AND OBJECTIVE BOX
Patient is a 52y old  Female who presents with dyspnea, orthopnea (03 Oct 2022 16:46)    PAST MEDICAL & SURGICAL HISTORY:  Diabetes    HTN (hypertension)    Neuropathy    Obesity    STAS    CAD    Former cigarette smoker  (smoked x 45 years; quit ~2013)    Marijuana smoker, continuous  (states smokes 3 - 4 times per day for pain management reasons)    IBS (irritable bowel syndrome)    Complex regional pain syndrome type 1    s/p spinal nerve stimulator placed 07/2022    History of cholecystectomy    History of hand surgery    INTERVAL HISTORY: feeling much better, MCCALL improved   	  MEDICATIONS:  MEDICATIONS  (STANDING):  buMETAnide Injectable 2 milliGRAM(s) IV Push two times a day  heparin   Injectable 7500 Unit(s) SubCutaneous every 12 hours  influenza   Vaccine 0.5 milliLiter(s) IntraMuscular once  insulin glargine Injectable (LANTUS) 25 Unit(s) SubCutaneous at bedtime  insulin lispro (ADMELOG) corrective regimen sliding scale   SubCutaneous at bedtime  insulin lispro (ADMELOG) corrective regimen sliding scale   SubCutaneous three times a day before meals  insulin lispro Injectable (ADMELOG) 12 Unit(s) SubCutaneous three times a day before meals  lisinopril 10 milliGRAM(s) Oral daily  NIFEdipine XL 60 milliGRAM(s) Oral daily  pantoprazole    Tablet 40 milliGRAM(s) Oral before breakfast  potassium chloride    Tablet ER 40 milliEquivalent(s) Oral every 4 hours  senna 2 Tablet(s) Oral at bedtime  sertraline 50 milliGRAM(s) Oral daily  spironolactone 25 milliGRAM(s) Oral daily  tiotropium 18 MICROgram(s) Capsule 1 Capsule(s) Inhalation daily    MEDICATIONS  (PRN):  baclofen 5 milliGRAM(s) Oral every 8 hours PRN Muscle Spasm  dextrose Oral Gel 15 Gram(s) Oral once PRN Blood Glucose LESS THAN 70 milliGRAM(s)/deciliter  melatonin 3 milliGRAM(s) Oral at bedtime PRN Sleep  ondansetron Injectable 4 milliGRAM(s) IV Push every 8 hours PRN Nausea and/or Vomiting  oxyCODONE    IR 15 milliGRAM(s) Oral every 4 hours PRN Severe Pain (7 - 10)  simethicone 80 milliGRAM(s) Chew three times a day PRN Gas    Vitals:  T(F): 97.8 (10-03-22 @ 10:28), Max: 98.2 (10-02-22 @ 23:45)  HR: 91 (10-03-22 @ 18:06) (70 - 106)  BP: 124/63 (10-03-22 @ 10:28) (119/58 - 124/63)  RR: 18 (10-03-22 @ 10:28) (18 - 19)  SpO2: 96% (10-03-22 @ 18:06) (93% - 99%)    10-03 @ 07:01  -  10-03 @ 18:08  --------------------------------------------------------  IN:    Oral Fluid: 200 mL  Total IN: 200 mL    OUT:  Total OUT: 0 mL    Total NET: 200 mL    PHYSICAL EXAM:  Neuro: Awake, responsive  CV: S1 S2 RRR  Lungs: diminished to bases   GI: Soft, BS +, ND, NT  Extremities: + LE edema    RADIOLOGY: < from: CT Angio Chest PE Protocol w/ IV Cont (09.23.22 @ 15:15) >  No evidence of central or lobar pulmonary emboli.    A combination of groundglass opacity and patchy airspace   disease/atelectasis involving the lungs bilaterally along with trace to   small pleural effusions.    < end of copied text >    DIAGNOSTIC TESTING:    [x ] Echocardiogram:  < from: TTE Echo Complete w/o Contrast w/ Doppler (09.28.22 @ 12:13) >  Left Ventricle: Normal left ventricular size and wall thicknesses, with   normal systolic and diastolic function.  Left ventricular ejection fraction, by visual estimation, is 60 to 65%.  Right Ventricle: The right ventricle was not well visualized.  Left Atrium: The left atrium is normal in size.  Right Atrium: The right atrium was not well visualized.  Mitral Valve: Structurally normal mitral valve, with normal leaflet   excursion. No evidence of mitral valve regurgitation is seen.  Tricuspid Valve: The tricuspid valve is not well seen. Trivial tricuspid   regurgitation is visualized. Estimated pulmonary artery systolic pressure   is 44.2 mmHg assuming a right atrial pressure of 8 mmHg, which is   consistent with mild pulmonary hypertension.  Aortic Valve: Normal trileaflet aortic valve with normal opening. No   evidence of aortic valve regurgitation is seen.  Pulmonic Valve: The pulmonic valve was not well visualized. Trace   pulmonic valve regurgitation.  Aorta: Aortic root measured at Sinus of Valsalva is normal.      Summary:   1. Left ventricular ejection fraction, by visual estimation, is 60 to   65%.   2. Normal left ventricular size and wall thicknesses, with normal   systolic and diastolic function.   3. Normal trileaflet aortic valve with normal opening.   4. Estimated pulmonary artery systolic pressure is 44.2 mmHg assuming a   right atrial pressure of 8 mmHg, which is consistent with mild pulmonary   hypertension.    < end of copied text >    LABS:	 	    03 Oct 2022 07:33    133    |  93     |  16     ----------------------------<  373    3.4     |  32     |  1.08   02 Oct 2022 05:52    134    |  95     |  14     ----------------------------<  326    3.4     |  31     |  1.02   01 Oct 2022 06:42    133    |  94     |  16     ----------------------------<  335    3.3     |  33     |  1.02     Ca    9.0        03 Oct 2022 07:33  Mg     1.3       03 Oct 2022 07:33    TPro  8.2    /  Alb  3.3    /  TBili  0.5    /  DBili  x      /  AST  19     /  ALT  27     /  AlkPhos  94     03 Oct 2022 07:33                        11.3   8.62  )-----------( 456      ( 03 Oct 2022 07:33 )             35.9 ,                       11.2   8.12  )-----------( 420      ( 02 Oct 2022 05:52 )             35.7 ,                       10.8   9.10  )-----------( 450      ( 01 Oct 2022 06:42 )             34.5                Patient is a 52y old  Female who presents with dyspnea, orthopnea (03 Oct 2022 16:46)    PAST MEDICAL & SURGICAL HISTORY:  Diabetes    HTN (hypertension)    Neuropathy    Obesity    STAS    CAD    Former cigarette smoker  (smoked x 45 years; quit ~2013)    Marijuana smoker, continuous  (states smokes 3 - 4 times per day for pain management reasons)    IBS (irritable bowel syndrome)    Complex regional pain syndrome type 1    s/p spinal nerve stimulator placed 07/2022    History of cholecystectomy    History of hand surgery    INTERVAL HISTORY: feeling much better, MCCALL improved   	  MEDICATIONS:  MEDICATIONS  (STANDING):  buMETAnide Injectable 2 milliGRAM(s) IV Push two times a day  heparin   Injectable 7500 Unit(s) SubCutaneous every 12 hours  influenza   Vaccine 0.5 milliLiter(s) IntraMuscular once  lisinopril 10 milliGRAM(s) Oral daily  NIFEdipine XL 60 milliGRAM(s) Oral daily  pantoprazole    Tablet 40 milliGRAM(s) Oral before breakfast  potassium chloride    Tablet ER 40 milliEquivalent(s) Oral every 4 hours  senna 2 Tablet(s) Oral at bedtime  sertraline 50 milliGRAM(s) Oral daily  spironolactone 25 milliGRAM(s) Oral daily  tiotropium 18 MICROgram(s) Capsule 1 Capsule(s) Inhalation daily    MEDICATIONS  (PRN):  baclofen 5 milliGRAM(s) Oral every 8 hours PRN Muscle Spasm  dextrose Oral Gel 15 Gram(s) Oral once PRN Blood Glucose LESS THAN 70 milliGRAM(s)/deciliter  melatonin 3 milliGRAM(s) Oral at bedtime PRN Sleep  ondansetron Injectable 4 milliGRAM(s) IV Push every 8 hours PRN Nausea and/or Vomiting  oxyCODONE    IR 15 milliGRAM(s) Oral every 4 hours PRN Severe Pain (7 - 10)  simethicone 80 milliGRAM(s) Chew three times a day PRN Gas    Vitals:  T(F): 97.8 (10-03-22 @ 10:28), Max: 98.2 (10-02-22 @ 23:45)  HR: 91 (10-03-22 @ 18:06) (70 - 106)  BP: 124/63 (10-03-22 @ 10:28) (119/58 - 124/63)  RR: 18 (10-03-22 @ 10:28) (18 - 19)  SpO2: 96% (10-03-22 @ 18:06) (93% - 99%)    10-03 @ 07:01  -  10-03 @ 18:08  --------------------------------------------------------  IN:    Oral Fluid: 200 mL  Total IN: 200 mL    OUT:  Total OUT: 0 mL    Total NET: 200 mL    PHYSICAL EXAM:  Neuro: Awake, responsive  CV: S1 S2 RRR  Lungs: diminished to bases   GI: Soft, BS +, ND, NT  Extremities: + LE edema    RADIOLOGY: < from: CT Angio Chest PE Protocol w/ IV Cont (09.23.22 @ 15:15) >  No evidence of central or lobar pulmonary emboli.    A combination of groundglass opacity and patchy airspace   disease/atelectasis involving the lungs bilaterally along with trace to   small pleural effusions.    < end of copied text >    DIAGNOSTIC TESTING:    [x ] Echocardiogram:  < from: TTE Echo Complete w/o Contrast w/ Doppler (09.28.22 @ 12:13) >  Left Ventricle: Normal left ventricular size and wall thicknesses, with   normal systolic and diastolic function.  Left ventricular ejection fraction, by visual estimation, is 60 to 65%.  Right Ventricle: The right ventricle was not well visualized.  Left Atrium: The left atrium is normal in size.  Right Atrium: The right atrium was not well visualized.  Mitral Valve: Structurally normal mitral valve, with normal leaflet   excursion. No evidence of mitral valve regurgitation is seen.  Tricuspid Valve: The tricuspid valve is not well seen. Trivial tricuspid   regurgitation is visualized. Estimated pulmonary artery systolic pressure   is 44.2 mmHg assuming a right atrial pressure of 8 mmHg, which is   consistent with mild pulmonary hypertension.  Aortic Valve: Normal trileaflet aortic valve with normal opening. No   evidence of aortic valve regurgitation is seen.  Pulmonic Valve: The pulmonic valve was not well visualized. Trace   pulmonic valve regurgitation.  Aorta: Aortic root measured at Sinus of Valsalva is normal.      Summary:   1. Left ventricular ejection fraction, by visual estimation, is 60 to   65%.   2. Normal left ventricular size and wall thicknesses, with normal   systolic and diastolic function.   3. Normal trileaflet aortic valve with normal opening.   4. Estimated pulmonary artery systolic pressure is 44.2 mmHg assuming a   right atrial pressure of 8 mmHg, which is consistent with mild pulmonary   hypertension.    < end of copied text >    LABS:	 	    03 Oct 2022 07:33    133    |  93     |  16     ----------------------------<  373    3.4     |  32     |  1.08   02 Oct 2022 05:52    134    |  95     |  14     ----------------------------<  326    3.4     |  31     |  1.02   01 Oct 2022 06:42    133    |  94     |  16     ----------------------------<  335    3.3     |  33     |  1.02     Ca    9.0        03 Oct 2022 07:33  Mg     1.3       03 Oct 2022 07:33    TPro  8.2    /  Alb  3.3    /  TBili  0.5    /  DBili  x      /  AST  19     /  ALT  27     /  AlkPhos  94     03 Oct 2022 07:33                        11.3   8.62  )-----------( 456      ( 03 Oct 2022 07:33 )             35.9 ,                       11.2   8.12  )-----------( 420      ( 02 Oct 2022 05:52 )             35.7 ,                       10.8   9.10  )-----------( 450      ( 01 Oct 2022 06:42 )             34.5

## 2022-10-03 NOTE — PROGRESS NOTE ADULT - PROBLEM SELECTOR PLAN 5
oxycodone prn, pregabalin
c/w home meds- oxycodone prn, pregabalin, baclofen
oxycodone prn, pregabalin
oxycodone prn, pregabalin
c/w home meds- oxycodone prn, pregabalin, baclofen
oxycodone prn, pregabalin
c/w home meds- oxycodone prn, pregabalin, baclofen
oxycodone prn, pregabalin

## 2022-10-03 NOTE — PROGRESS NOTE ADULT - ASSESSMENT
REVIEW OF SYMPTOMS      Able to give ROS  Yes     RELIABLE +/-   CONSTITUTIONAL Weakness Yes  Chills No   ENDOCRINE  No heat or cold intolerance    ALLERGY No hives  Sore throat No stridor  RESP Coughing blood no  Shortness of breath YES   NEURO No Headache  Confusion Pain neck No   CARDIAC No Chest pain No Palpitations   GI  Pain abdomen NO   Vomiting NO     NOTABLE FINDINGS    PHYSICAL EXAM    HEENT Unremarkable  atraumatic   RESP Fair air entry EXP prolonged    Harsh breath sound Resp distres mild   CARDIAC S1 S2 No S3     NO JVD    ABDOMEN SOFT BS PRESENT NOT DISTENDED No hepatosplenomegaly PEDAL EDEMA present No calf tenderness  NO rash       AGE/SEX.   52 f  DOA.  9/22/2022   CC .  9/22/2022 from [facility] with sob fluid retentn incr o2 requiremnt     RECENT HOSPITAL STAY.  7/10-7/23 [facility] Fever cough sob  Rxed mf pneum needed intubatn ards then bpap   extubatd 7/17 needed proni     HOSPITAL COURSE.   Fever 9/23/2022  COPD  Obesity   RO DVR   RO OHS   RO HFPEF    PMH .  pmh Former smoker smoker 45 y quit 2013   pmh marijuana smoker   pmh DM2  pmh Hytn   pmh IBS   pmh CRPS (Complex regional pain syndrome)  pmh spinal nerve stimulatoir 7/8/2022   pms pseudotumor cerebri        GENERAL DATA .   GOC.   9/22/2022 full code     ALLGY.       amitriptiline                       WT.   9/22/2022 156        BMI.    9/22/2022 52                           ICU STAY.  none  COVID.  9/24/2022 scv2 (-)     BEST PRACTICE ISSUES.    HOB ELEVATN. Yes  DVT PPLX. 9/22/2022 [facility] 7500.2      EWING PPLX.   9/22/2022 protonix 40    INFN PPLX.    SP SW DAWIT.   7/19 FEES      DIET.  9/22/2022 cons carb  1.2l              VS/ PO/IO/ VENT/ DRIPS.  10/3/2022 afeb 70 120/70   10/3/2022 3l 96%     ASSESSMENT/RECOMMENDATIONS .   RESP.  SHORTNESS OF BREATH.  Shortness of breath is likely multifactorial.  She had recent ards sec aspn pneum  She has morbid obesity  bmi 52  She may have OHS   She has hfpef  She will need further eval as outpt including pfts and sleep study    COPD.  9/23/2022 spiriva   cont rx    DVT pplx   9/22/2022 Is on hpsc    RESP FAILURE.  9/24/2022 5a 12/5/.6 743/49/147  9/22/2022 CO2 is 27    Target po 90-95% pH 730 (+)  IS ON NOCT BPAP for  STAS   will need abg close to discharge to determine need for home nppv or this can be arranged as outpt at pulm office     RO PE.  D-dimr 9/22/2022 dd 461  ct ch 9/23 no pe  v duplx 9/23 (-)   no vte     PULM HYTN   9/30/2022 echo pasp 44 n l vs and d f  9/30/2022 ph may nbe sec ohs and stas   Needes outpt [place] of ph including rhc to see if she has precapillary ph    INFECTION.  W 9/22-9/23-9/24-9/30/2022 w 14- 10.0 - 9.9 - 9.3    ct ch 9/23 no pe ggo and patchy airspace opac atelectasis bl  and tr pl effs  flu ab 9/22/2022 (-)   rsv 9/22/2022 (-)   rvp 9/24 (-)   CT ch findings likely resolving ards which she  had 7/10-7/23 admission [facility]     CARDIAC.  CAD.  tr 9/22/2022 tr 15  10/3/2022 carvedilol 3125x2   mi ruld out    HYTN.  9/22 nifedipine 60    bp controlld     HFPEF.  echo 7/13/2022 hyperdynamic lvsf n rvsf n  echo 9/28/2022 ef 60% pasp 44  n lv syst and diast fn   bnp 9/22/2022 bnp 87   9/22/2022 lisinopril 10   9/25 spironolact 25   10/3/2022 bumetanide 2  improving        GI.  no active issues    HEMAT.  Hb 9/22-9/24/2022 Hb 10 - 9.9  monitor     RENAL.  Na 9/22-9/28-10/1-10/2/2022 Na 145 - 137- 133 - 134  Cr 9/22-9/27-9/30/2022 Cr .8 - .9 - 1.1   moniytor    TIME SPENT   Over 25 minutes aggregate care time spent on encounter; activities included   direct patient care, counseling and/or coordinating care reviewing notes, lab data/ imaging , discussion with multidisciplinary team/ patient  /family and explaining in detail risks, benefits, alternatives  of the recommendations     MARIBEL KELLY 52 f 1970 9/22/2022 DR JUVENCIO COKER

## 2022-10-03 NOTE — PROGRESS NOTE ADULT - PROBLEM SELECTOR PLAN 9
SQ heparin for vte ppx
VTE prop: heparin sc q12 hrs
SQ heparin for vte ppx
SQ heparin for vte ppx
VTE prop: heparin sc q12 hrs

## 2022-10-03 NOTE — PROGRESS NOTE ADULT - PROBLEM SELECTOR PLAN 6
sertraline

## 2022-10-03 NOTE — PROGRESS NOTE ADULT - PROBLEM SELECTOR PLAN 3
replace
supplemented
replace
replace
K 3.1, will replace
replace
supplemented
replace
replace
supplemented
replace

## 2022-10-03 NOTE — PROGRESS NOTE ADULT - NS ATTEND AMEND GEN_ALL_CORE FT
Change to oral BUmex, cont  spironolactone 25 mg daily to help maintain negative fluid balance  Cont with fluid and sodium restriction   Currently on nifedipine and lisinopril for blood pressure control   Would consider RHC to confirm R-sided pressures, would arrange as out patient.  Follow up with Dr. Fong in one week post discharge.   Will follow only as needed.
improving significantly with bumex diuresis  continue IV bumex, spironolactone  when more euvolemic and able to lay flat check TTE
significantly diuresis with bumex  LE edema is becoming softer  continue IV bumex and spironolactone  repeat TTE with normal diastolic dysfunction, mildly elevated pulmonary pressures with poorly visualized RV.    clinical diagnosis remains CHF  after discharge will recommend RHC to confirm R-sided pressures.
significantly diuresis with bumex  LE edema is becoming softer  continue IV bumex and spironolactone  repeat TTE
still responding well to IV bumex  LE edema is becoming softer  continue IV bumex and spironolactone  repeat TTE with normal diastolic dysfunction, mildly elevated pulmonary pressures with poorly visualized RV.    clinical diagnosis remains CHF  anticipate possible PO trial of bumex in the next 24-48 hours  after discharge will recommend outpt RHC to confirm R-sided pressures.
significantly diuresis with bumex  LE edema is becoming softer  continue IV bumex and spironolactone  repeat TTE with normal diastolic dysfunction, mildly elevated pulmonary pressures with poorly visualized RV.    clinical diagnosis remains CHF  after discharge will recommend LHC to confirm R-sided pressures.

## 2022-10-03 NOTE — PROGRESS NOTE ADULT - ASSESSMENT
52-year-old female with history of chronic pain syndrome, diabetes, hypertension, morbid obesity, STAS on CPAP at home, CAD, IBS, pseudotumor cerebri admitted with dyspnea and increasing lower extremity swelling with orthopnea.  Responsive to oxygen and diuretics.  No evidence of PE.  Likely element of heart failure preserved ejection fraction.   Resting comfortably in bed with less shortness of breath and ankle edema much improved    PLAN:     cont on Bumex 2 mg IV push twice daily with spironolactone 25 mg daily to help maintain negative fluid balance  can be switched to po Bumex by tomorrow if remains clinically stable   Cont with fluid and sodium restriction   Currently on nifedipine and lisinopril for blood pressure control   after discharge will recommend outpt RHC to confirm R-sided pressures.  Follow up with Dr. Fong   52-year-old female with history of chronic pain syndrome, diabetes, hypertension, morbid obesity, STAS on CPAP at home, CAD, IBS, pseudotumor cerebri admitted with dyspnea and increasing lower extremity swelling with orthopnea.  Responsive to oxygen and diuretics.  No evidence of PE.  Likely element of heart failure preserved ejection fraction.   Resting comfortably in bed with less shortness of breath and ankle edema much improved

## 2022-10-03 NOTE — PROGRESS NOTE ADULT - PROBLEM SELECTOR PROBLEM 1
Acute respiratory failure with hypoxia

## 2022-10-03 NOTE — PROGRESS NOTE ADULT - PROBLEM SELECTOR PLAN 1
Currently comfortable on 4L     monitor respiratory status    Will continue nocturnal BiPAP    Cardio on board, Likely CHF exacerbation repeat echo, bumex started
Currently comfortable on 4L     monitor respiratory status    Will continue nocturnal BiPAP    Cardio on board, repeat echo, bumex started
Currently comfortable on 4L    lasix IV BID.  Had an echo in July with no abnormalities  monitor respiratory status    Will continue nocturnal BiPAP  Will have cardiology see patient
on NC day time  Bipap hs  IV diuresis w/ IV Bumex 2mg BID- still w/ significant LE edema and basal rales- creat stable- c/w iv diuretics for now   cont spironolactone 25mg daily  monitor renal function and electrolytes   repeat TTE when more euvolemic and able to lay flat  cont lisinopril  eventual bblocker and optimization of her GDMT  seen by cardio and pulm   after discharge  RHC to confirm R-sided pressures.
Currently comfortable on 2L  monitor respiratory status    Will continue nocturnal BiPAP  cont IV Bumex 2mg BID  cont spironolactone 25mg daily  monitor renal function and electrolytes   repeat TTE when more euvolemic and able to lay flat  cont lisinopril  eventual bblocker and optimization of her GDMT
on NC day time  Bipap hs  IV diuresis w/ IV Bumex 2mg BID- still w/ significant LE edema and basal rales- creat stable- c/w iv diuretics for now   cont spironolactone 25mg daily  monitor renal function and electrolytes   repeat TTE when more euvolemic and able to lay flat  cont lisinopril  eventual bblocker and optimization of her GDMT  seen by cardio and pulm   after discharge  RHC to confirm R-sided pressures.
Currently comfortable on 4L    lasix IV BID.  Had an echo in July with no abnormalities  monitor respiratory status    Will continue nocturnal BiPAP
Currently comfortable on 2L  monitor respiratory status    Will continue nocturnal BiPAP  cont IV Bumex 2mg BID  cont spironolactone 25mg daily  monitor renal function and electrolytes   repeat TTE when more euvolemic and able to lay flat  cont lisinopril  eventual bblocker and optimization of her GDMT
on NC day time  Bipap hs  IV diuresis w/ IV Bumex 2mg BID- still w/ significant LE edema and basal rales- creat stable- c/w iv diuretics for now   cont spironolactone 25mg daily  monitor renal function and electrolytes   repeat TTE when more euvolemic and able to lay flat  cont lisinopril  eventual bblocker and optimization of her GDMT  seen by cardio and pulm   after discharge  RHC to confirm R-sided pressures.
Currently comfortable on 2L  monitor respiratory status    Will continue nocturnal BiPAP  cont IV Bumex 2mg BID  cont spironolactone 25mg daily  monitor renal function and electrolytes   repeat TTE when more euvolemic and able to lay flat  cont lisinopril  eventual bblocker and optimization of her GDMT
Currently comfortable on 4L  Will start lasix IV BID.  Had an echo in July with no abnormalities  monitor respiratory status    Will continue nocturnal BiPAP

## 2022-10-03 NOTE — PROGRESS NOTE ADULT - PROBLEM SELECTOR PLAN 7
insulin corrective scale  add lantus/humalog  A1c 7
insulin corrective scale
insulin corrective scale
insulin corrective scale  add lantus/humalog
BG elevated  Hab 1c 7  monitro fsbs/ISS  increase pre meal humalog to 10 unist tid and lantus to 25 U
insulin corrective scale  add lantus/humalog  A1c 7
BG elevated  monitro fsbs/ISS  increase pre meal humalog to 8 unist tid
insulin corrective scale  add lantus/humalog  A1c 7
BG elevated  monitro fsbs/ISS  increase pre meal humalog to 8 unist tid
insulin corrective scale
insulin corrective scale

## 2022-10-03 NOTE — PROGRESS NOTE ADULT - PROBLEM SELECTOR PLAN 4
nifedipine, lisinopril

## 2022-10-03 NOTE — PROGRESS NOTE ADULT - NUTRITIONAL ASSESSMENT
This patient has been assessed with a concern for Malnutrition and has been determined to have a diagnosis/diagnoses of Morbid obesity (BMI > 40).    This patient is being managed with:   Diet Consistent Carbohydrate w/Evening Snack-  1200mL Fluid Restriction ([id number])  Low Sodium  Liquid Protein Supplement     Qty per Day:  1  Entered: Sep 27 2022 12:05PM    
This patient has been assessed with a concern for Malnutrition and has been determined to have a diagnosis/diagnoses of Morbid obesity (BMI > 40).    This patient is being managed with:   Diet Consistent Carbohydrate w/Evening Snack-  1200mL Fluid Restriction ([id number])  Low Sodium  Liquid Protein Supplement     Qty per Day:  1  Entered: Sep 27 2022 12:05PM    
This patient has been assessed with a concern for Malnutrition and has been determined to have a diagnosis/diagnoses of Morbid obesity (BMI > 40).    This patient is being managed with:   Diet Consistent Carbohydrate w/Evening Snack-  1200mL Fluid Restriction ([id number])  Low Sodium  Liquid Protein Supplement     Qty per Day:  1  Entered: Sep 27 2022 12:05PM    
This patient has been assessed with a concern for Malnutrition and has been determined to have a diagnosis/diagnoses of Morbid obesity (BMI > 40).    This patient is being managed with:   Diet Consistent Carbohydrate w/Evening Snack-  1200mL Fluid Restriction ([id number])  Low Sodium  Liquid Protein Supplement     Qty per Day:  1  Entered: Sep 27 2022 12:05PM    
This patient has been assessed with a concern for Malnutrition and has been determined to have a diagnosis/diagnoses of Morbid obesity (BMI > 40).    This patient is being managed with:   Diet Consistent Carbohydrate w/Evening Snack-  1200mL Fluid Restriction ([id number])  Low Sodium  Liquid Protein Supplement     Qty per Day:  1  Entered: Sep 27 2022 12:05PM    
This patient has been assessed with a concern for Malnutrition and has been determined to have a diagnosis/diagnoses of Morbid obesity (BMI > 40).    This patient is being managed with:   Diet Consistent Carbohydrate w/Evening Snack-  1200mL Fluid Restriction ([id number])  Low Sodium  Liquid Protein Supplement     Qty per Day:  1  Entered: Sep 27 2022 12:05PM    
This patient has been assessed with a concern for Malnutrition and has been determined to have a diagnosis/diagnoses of Morbid obesity (BMI > 40).    This patient is being managed with:   Diet Consistent Carbohydrate w/Evening Snack-  Low Sodium  Liquid Protein Supplement     Qty per Day:  1 estelle luo  Entered: Sep 23 2022  3:33PM    
This patient has been assessed with a concern for Malnutrition and has been determined to have a diagnosis/diagnoses of Morbid obesity (BMI > 40).    This patient is being managed with:   Diet Consistent Carbohydrate w/Evening Snack-  1200mL Fluid Restriction ([id number])  Low Sodium  Liquid Protein Supplement     Qty per Day:  1  Entered: Sep 27 2022 12:05PM    
This patient has been assessed with a concern for Malnutrition and has been determined to have a diagnosis/diagnoses of Morbid obesity (BMI > 40).    This patient is being managed with:   Diet Consistent Carbohydrate w/Evening Snack-  1200mL Fluid Restriction ([id number])  Low Sodium  Liquid Protein Supplement     Qty per Day:  1  Entered: Sep 27 2022 12:05PM    
This patient has been assessed with a concern for Malnutrition and has been determined to have a diagnosis/diagnoses of Morbid obesity (BMI > 40).    This patient is being managed with:   Diet Consistent Carbohydrate w/Evening Snack-  Low Sodium  Liquid Protein Supplement     Qty per Day:  1 estelle luo  Entered: Sep 23 2022  3:33PM    
This patient has been assessed with a concern for Malnutrition and has been determined to have a diagnosis/diagnoses of Morbid obesity (BMI > 40).    This patient is being managed with:   Diet Consistent Carbohydrate w/Evening Snack-  Low Sodium  Liquid Protein Supplement     Qty per Day:  1 pkg aiky  Entered: Sep 23 2022  3:33PM    Diet Consistent Carbohydrate w/Evening Snack-  Entered: Sep 22 2022  4:03AM    The following pending diet order is being considered for treatment of Morbid obesity (BMI > 40):null
This patient has been assessed with a concern for Malnutrition and has been determined to have a diagnosis/diagnoses of Morbid obesity (BMI > 40).    This patient is being managed with:   Diet Consistent Carbohydrate w/Evening Snack-  1200mL Fluid Restriction ([id number])  Low Sodium  Liquid Protein Supplement     Qty per Day:  1  Entered: Sep 27 2022 12:05PM    
This patient has been assessed with a concern for Malnutrition and has been determined to have a diagnosis/diagnoses of Morbid obesity (BMI > 40).    This patient is being managed with:   Diet Consistent Carbohydrate w/Evening Snack-  1200mL Fluid Restriction ([id number])  Low Sodium  Liquid Protein Supplement     Qty per Day:  1  Entered: Sep 27 2022 12:05PM    
This patient has been assessed with a concern for Malnutrition and has been determined to have a diagnosis/diagnoses of Morbid obesity (BMI > 40).    This patient is being managed with:   Diet Consistent Carbohydrate w/Evening Snack-  Low Sodium  Liquid Protein Supplement     Qty per Day:  1 estelle luo  Entered: Sep 23 2022  3:33PM    
This patient has been assessed with a concern for Malnutrition and has been determined to have a diagnosis/diagnoses of Morbid obesity (BMI > 40).    This patient is being managed with:   Diet Consistent Carbohydrate w/Evening Snack-  Low Sodium  Liquid Protein Supplement     Qty per Day:  1 pkg aiky  Entered: Sep 23 2022  3:33PM    Diet Consistent Carbohydrate w/Evening Snack-  Entered: Sep 22 2022  4:03AM    The following pending diet order is being considered for treatment of Morbid obesity (BMI > 40):null

## 2022-10-03 NOTE — PROGRESS NOTE ADULT - PROBLEM SELECTOR PLAN 2
Ultrasound negative for DVT of RLE
Ultrasound negative for DVT of RLE  leg edema sec to chf
Ultrasound negative for DVT of RLE  leg edema sec to chf
Ultrasound negative for DVT of RLE  2/2 edema
Ultrasound negative for DVT of RLE
Ultrasound negative for DVT of RLE
Ultrasound negative for DVT of RLE  leg edema sec to chf
Ultrasound negative for DVT of RLE

## 2022-10-03 NOTE — PROGRESS NOTE ADULT - PROBLEM SELECTOR PROBLEM 5
CRPS (complex regional pain syndrome) type I

## 2022-10-04 ENCOUNTER — TRANSCRIPTION ENCOUNTER (OUTPATIENT)
Age: 52
End: 2022-10-04

## 2022-10-04 VITALS — HEART RATE: 74 BPM | OXYGEN SATURATION: 98 %

## 2022-10-04 LAB
ANION GAP SERPL CALC-SCNC: 9 MMOL/L — SIGNIFICANT CHANGE UP (ref 5–17)
BUN SERPL-MCNC: 15 MG/DL — SIGNIFICANT CHANGE UP (ref 7–23)
CALCIUM SERPL-MCNC: 9 MG/DL — SIGNIFICANT CHANGE UP (ref 8.5–10.1)
CHLORIDE SERPL-SCNC: 94 MMOL/L — LOW (ref 96–108)
CO2 SERPL-SCNC: 28 MMOL/L — SIGNIFICANT CHANGE UP (ref 22–31)
CREAT SERPL-MCNC: 0.98 MG/DL — SIGNIFICANT CHANGE UP (ref 0.5–1.3)
EGFR: 69 ML/MIN/1.73M2 — SIGNIFICANT CHANGE UP
GLUCOSE BLDC GLUCOMTR-MCNC: 293 MG/DL — HIGH (ref 70–99)
GLUCOSE BLDC GLUCOMTR-MCNC: 333 MG/DL — HIGH (ref 70–99)
GLUCOSE BLDC GLUCOMTR-MCNC: 394 MG/DL — HIGH (ref 70–99)
GLUCOSE SERPL-MCNC: 277 MG/DL — HIGH (ref 70–99)
POTASSIUM SERPL-MCNC: 3.7 MMOL/L — SIGNIFICANT CHANGE UP (ref 3.5–5.3)
POTASSIUM SERPL-SCNC: 3.7 MMOL/L — SIGNIFICANT CHANGE UP (ref 3.5–5.3)
SODIUM SERPL-SCNC: 131 MMOL/L — LOW (ref 135–145)

## 2022-10-04 PROCEDURE — 99232 SBSQ HOSP IP/OBS MODERATE 35: CPT

## 2022-10-04 RX ORDER — SPIRONOLACTONE 25 MG/1
1 TABLET, FILM COATED ORAL
Qty: 0 | Refills: 0 | DISCHARGE
Start: 2022-10-04

## 2022-10-04 RX ORDER — TIOTROPIUM BROMIDE 18 UG/1
1 CAPSULE ORAL; RESPIRATORY (INHALATION)
Qty: 0 | Refills: 0 | DISCHARGE
Start: 2022-10-04

## 2022-10-04 RX ORDER — BUMETANIDE 0.25 MG/ML
1 INJECTION INTRAMUSCULAR; INTRAVENOUS
Qty: 0 | Refills: 0 | DISCHARGE
Start: 2022-10-04

## 2022-10-04 RX ORDER — INSULIN DEGLUDEC 100 U/ML
80 INJECTION, SOLUTION SUBCUTANEOUS
Qty: 0 | Refills: 0 | DISCHARGE

## 2022-10-04 RX ORDER — SENNA PLUS 8.6 MG/1
2 TABLET ORAL
Qty: 0 | Refills: 0 | DISCHARGE
Start: 2022-10-04

## 2022-10-04 RX ORDER — LANOLIN ALCOHOL/MO/W.PET/CERES
1 CREAM (GRAM) TOPICAL
Qty: 0 | Refills: 0 | DISCHARGE
Start: 2022-10-04

## 2022-10-04 RX ORDER — CARVEDILOL PHOSPHATE 80 MG/1
1 CAPSULE, EXTENDED RELEASE ORAL
Qty: 0 | Refills: 0 | DISCHARGE
Start: 2022-10-04

## 2022-10-04 RX ORDER — BACLOFEN 100 %
1 POWDER (GRAM) MISCELLANEOUS
Qty: 0 | Refills: 0 | DISCHARGE
Start: 2022-10-04

## 2022-10-04 RX ADMIN — Medication 12 UNIT(S): at 08:32

## 2022-10-04 RX ADMIN — BUMETANIDE 2 MILLIGRAM(S): 0.25 INJECTION INTRAMUSCULAR; INTRAVENOUS at 07:29

## 2022-10-04 RX ADMIN — Medication 4: at 12:07

## 2022-10-04 RX ADMIN — OXYCODONE HYDROCHLORIDE 15 MILLIGRAM(S): 5 TABLET ORAL at 12:52

## 2022-10-04 RX ADMIN — OXYCODONE HYDROCHLORIDE 15 MILLIGRAM(S): 5 TABLET ORAL at 00:38

## 2022-10-04 RX ADMIN — SPIRONOLACTONE 25 MILLIGRAM(S): 25 TABLET, FILM COATED ORAL at 07:29

## 2022-10-04 RX ADMIN — OXYCODONE HYDROCHLORIDE 15 MILLIGRAM(S): 5 TABLET ORAL at 13:30

## 2022-10-04 RX ADMIN — HEPARIN SODIUM 7500 UNIT(S): 5000 INJECTION INTRAVENOUS; SUBCUTANEOUS at 17:38

## 2022-10-04 RX ADMIN — CARVEDILOL PHOSPHATE 3.12 MILLIGRAM(S): 80 CAPSULE, EXTENDED RELEASE ORAL at 07:29

## 2022-10-04 RX ADMIN — Medication 5: at 17:24

## 2022-10-04 RX ADMIN — OXYCODONE HYDROCHLORIDE 15 MILLIGRAM(S): 5 TABLET ORAL at 01:40

## 2022-10-04 RX ADMIN — LISINOPRIL 10 MILLIGRAM(S): 2.5 TABLET ORAL at 07:28

## 2022-10-04 RX ADMIN — Medication 3 MILLIGRAM(S): at 00:38

## 2022-10-04 RX ADMIN — Medication 12 UNIT(S): at 12:06

## 2022-10-04 RX ADMIN — TIOTROPIUM BROMIDE 1 CAPSULE(S): 18 CAPSULE ORAL; RESPIRATORY (INHALATION) at 12:42

## 2022-10-04 RX ADMIN — Medication 3: at 08:32

## 2022-10-04 RX ADMIN — PANTOPRAZOLE SODIUM 40 MILLIGRAM(S): 20 TABLET, DELAYED RELEASE ORAL at 07:29

## 2022-10-04 RX ADMIN — SIMETHICONE 80 MILLIGRAM(S): 80 TABLET, CHEWABLE ORAL at 13:13

## 2022-10-04 RX ADMIN — HEPARIN SODIUM 7500 UNIT(S): 5000 INJECTION INTRAVENOUS; SUBCUTANEOUS at 07:30

## 2022-10-04 RX ADMIN — CARVEDILOL PHOSPHATE 3.12 MILLIGRAM(S): 80 CAPSULE, EXTENDED RELEASE ORAL at 17:49

## 2022-10-04 RX ADMIN — SERTRALINE 50 MILLIGRAM(S): 25 TABLET, FILM COATED ORAL at 12:41

## 2022-10-04 RX ADMIN — Medication 40 MILLIEQUIVALENT(S): at 07:28

## 2022-10-04 RX ADMIN — INFLUENZA VIRUS VACCINE 0.5 MILLILITER(S): 15; 15; 15; 15 SUSPENSION INTRAMUSCULAR at 15:17

## 2022-10-04 RX ADMIN — OXYCODONE HYDROCHLORIDE 15 MILLIGRAM(S): 5 TABLET ORAL at 20:45

## 2022-10-04 RX ADMIN — Medication 12 UNIT(S): at 17:24

## 2022-10-04 NOTE — PROGRESS NOTE ADULT - SUBJECTIVE AND OBJECTIVE BOX
Patient is a 52y old  Female who presents with a chief complaint of dyspnea, orthopnea (04 Oct 2022 12:28)    PAST MEDICAL & SURGICAL HISTORY:  Diabetes    HTN (hypertension)    Neuropathy    Obesity    STAS    CAD    Former cigarette smoker  (smoked x 45 years; quit ~2013)    Marijuana smoker, continuous  (states smokes 3 - 4 times per day for pain management reasons)    IBS (irritable bowel syndrome)    Complex regional pain syndrome type 1    s/p spinal nerve stimulator placed 07/2022    History of cholecystectomy    History of hand surgery    INTERVAL HISTORY: Patient not in any distress and able to ambulate without dyspnea. No complaint of chest pain.  	  MEDICATIONS:  MEDICATIONS  (STANDING):  buMETAnide 2 milliGRAM(s) Oral daily  carvedilol 3.125 milliGRAM(s) Oral every 12 hours  heparin   Injectable 7500 Unit(s) SubCutaneous every 12 hours  influenza   Vaccine 0.5 milliLiter(s) IntraMuscular once  insulin glargine Injectable (LANTUS) 25 Unit(s) SubCutaneous at bedtime  insulin lispro (ADMELOG) corrective regimen sliding scale   SubCutaneous at bedtime  insulin lispro (ADMELOG) corrective regimen sliding scale   SubCutaneous three times a day before meals  insulin lispro Injectable (ADMELOG) 12 Unit(s) SubCutaneous three times a day before meals  lisinopril 10 milliGRAM(s) Oral daily  pantoprazole    Tablet 40 milliGRAM(s) Oral before breakfast  senna 2 Tablet(s) Oral at bedtime  sertraline 50 milliGRAM(s) Oral daily  spironolactone 25 milliGRAM(s) Oral daily  tiotropium 18 MICROgram(s) Capsule 1 Capsule(s) Inhalation daily    MEDICATIONS  (PRN):  baclofen 5 milliGRAM(s) Oral every 8 hours PRN Muscle Spasm  dextrose Oral Gel 15 Gram(s) Oral once PRN Blood Glucose LESS THAN 70 milliGRAM(s)/deciliter  melatonin 3 milliGRAM(s) Oral at bedtime PRN Sleep  ondansetron Injectable 4 milliGRAM(s) IV Push every 8 hours PRN Nausea and/or Vomiting  oxyCODONE    IR 15 milliGRAM(s) Oral every 4 hours PRN Severe Pain (7 - 10)  simethicone 80 milliGRAM(s) Chew three times a day PRN Gas    Vitals:  T(F): 98 (10-04-22 @ 10:57), Max: 98.3 (10-03-22 @ 18:29)  HR: 78 (10-04-22 @ 10:57) (60 - 95)  BP: 144/70 (10-04-22 @ 10:57) (125/68 - 150/81)  RR: 18 (10-04-22 @ 10:57) (18 - 20)  SpO2: 94% (10-04-22 @ 10:57) (94% - 100%)    10-03 @ 07:01  -  10-04 @ 07:00  --------------------------------------------------------  IN:    Oral Fluid: 680 mL  Total IN: 680 mL    OUT:  Total OUT: 0 mL    Total NET: 680 mL    10-04 @ 07:01  -  10-04 @ 13:23  --------------------------------------------------------  IN:    Oral Fluid: 500 mL  Total IN: 500 mL    OUT:  Total OUT: 0 mL    Total NET: 500 mL    PHYSICAL EXAM:  Neuro: Awake, responsive  CV: S1 S2 RRR  Lungs: diminished to bases, no wheezing, rales  GI: Soft, BS +, ND, NT  Extremities: +LE edema    RADIOLOGY: < from: CT Angio Chest PE Protocol w/ IV Cont (09.23.22 @ 15:15) >  No evidence of central or lobar pulmonary emboli.    A combination of groundglass opacity and patchy airspace   disease/atelectasis involving the lungs bilaterally along with trace to   small pleural effusions.    < end of copied text >  DIAGNOSTIC TESTING:    [ x] Echocardiogram:< from: TTE Echo Complete w/o Contrast w/ Doppler (09.28.22 @ 12:13) >  Left Ventricle: Normal left ventricular size and wall thicknesses, with   normal systolic and diastolic function.  Left ventricular ejection fraction, by visual estimation, is 60 to 65%.  Right Ventricle: The right ventricle was not well visualized.  Left Atrium: The left atrium is normal in size.  Right Atrium: The right atrium was not well visualized.  Mitral Valve: Structurally normal mitral valve, with normal leaflet   excursion. No evidence of mitral valve regurgitation is seen.  Tricuspid Valve: The tricuspid valve is not well seen. Trivial tricuspid   regurgitation is visualized. Estimated pulmonary artery systolic pressure   is 44.2 mmHg assuming a right atrial pressure of 8 mmHg, which is   consistent with mild pulmonary hypertension.  Aortic Valve: Normal trileaflet aortic valve with normal opening. No   evidence of aortic valve regurgitation is seen.  Pulmonic Valve: The pulmonic valve was not well visualized. Trace   pulmonic valve regurgitation.  Aorta: Aortic root measured at Sinus of Valsalva is normal.    Summary:   1. Left ventricular ejection fraction, by visual estimation, is 60 to   65%.   2. Normal left ventricular size and wall thicknesses, with normal   systolic and diastolic function.   3. Normal trileaflet aortic valve with normal opening.   4. Estimated pulmonary artery systolic pressure is 44.2 mmHg assuming a   right atrial pressure of 8 mmHg, which is consistent with mild pulmonary   hypertension.    < end of copied text >    LABS:	 	    04 Oct 2022 07:35    131    |  94     |  15     ----------------------------<  277    3.7     |  28     |  0.98   03 Oct 2022 07:33    133    |  93     |  16     ----------------------------<  373    3.4     |  32     |  1.08   02 Oct 2022 05:52    134    |  95     |  14     ----------------------------<  326    3.4     |  31     |  1.02     Ca    9.0        04 Oct 2022 07:35  Mg     1.3       03 Oct 2022 07:33    TPro  8.2    /  Alb  3.3    /  TBili  0.5    /  DBili  x      /  AST  19     /  ALT  27     /  AlkPhos  94     03 Oct 2022 07:33                 11.3   8.62  )-----------( 456      ( 03 Oct 2022 07:33 )             35.9 ,                       11.2   8.12  )-----------( 420      ( 02 Oct 2022 05:52 )             35.7

## 2022-10-04 NOTE — DISCHARGE NOTE NURSING/CASE MANAGEMENT/SOCIAL WORK - PATIENT PORTAL LINK FT
You can access the FollowMyHealth Patient Portal offered by [facility] by registering at the following website: [email or web address]. By joining [facility]’s FollowMyHealth portal, you will also be able to view your health information using other applications (apps) compatible with our system.

## 2022-10-04 NOTE — PROGRESS NOTE ADULT - PROVIDER SPECIALTY LIST ADULT
Cardiology
Cardiology
Pulmonology
Cardiology
Pulmonology
Cardiology
Pulmonology
Pulmonology
Cardiology
Cardiology
Hospitalist

## 2022-10-04 NOTE — PROGRESS NOTE ADULT - ASSESSMENT
52-year-old female with history of chronic pain syndrome, diabetes, hypertension, morbid obesity, STAS on CPAP at home, CAD, IBS, pseudotumor cerebri admitted with dyspnea and increasing lower extremity swelling with orthopnea.  Responsive to oxygen and diuretics.  No evidence of PE.  Likely element of heart failure preserved ejection fraction.   Resting comfortably in bed with no shortness of breath and ankle edema much improved    -Continue oral Bumex  -Monitor renal function and electrolytes, daily weight  -Fluid and salt restriction emphasized  -Continue aldactone, lisinopril, and Coreg. Can increase Coreg dose if needed for blood pressure control.  -Outpatient follow up with Dr. Fong within one week of discharge  -recommend outpt RHC to confirm R-sided pressures.

## 2022-10-04 NOTE — DISCHARGE NOTE PROVIDER - NSDCCPCAREPLAN_GEN_ALL_CORE_FT
PRINCIPAL DISCHARGE DIAGNOSIS  Diagnosis: Fluid overload  Assessment and Plan of Treatment: 52F with a PMH of chronic pain syndrome (spinal nerve stimulator placed 07/2022, morbid obesity, DM, HTN, CAD, IBS, pseudotumor cerebri who presents to the ED for dyspnea. and worsening edema BLLE   Patient reportedly requiring increased oxygen concentrations at the NH - as high as 10L/min?  Patient concerned as she was started on lasix at the NH however her urination has not increased.  Patient also reports 15 lb weight gain in the last two weeks.    Acute respiratory failure with hypoxia.   2/2 acute diastolic CHF  On NC day time  BIPAP QHS  S/P IV diuresis w/ IV Bumex 2mg BID, dc on PO   cont spironolactone 25mg daily  monitor renal function and electrolytes   cont lisinopril, coreg  seen by cardio and pulm   after discharge  RHC to confirm R-sided pressures.  Follow up with Dr. Fong  Leg swelling.   Ultrasound negative for DVT of RLE  leg edema sec to CHF  Essential hypertension.   Coreg, lisinopril.  CRPS (complex regional pain syndrome) type I.    c/w home meds- oxycodone prn, pregabalin, baclofen.  Chronic anxiety.   sertraline.  Diabetes mellitus.   BG elevated  Hab 1c 7  Resume home med on dc

## 2022-10-04 NOTE — DISCHARGE NOTE NURSING/CASE MANAGEMENT/SOCIAL WORK - NSDCVIVACCINE_GEN_ALL_CORE_FT
influenza, injectable, quadrivalent, preservative free; 04-Oct-2022 15:17; Elissa Cano (RN); [employer]; [id number] (Exp. Date: 30-Jun-2023); IntraMuscular; Deltoid Left.; 0.5 milliLiter(s); VIS (VIS Published: 06-Aug-2021, VIS Presented: 04-Oct-2022);

## 2022-10-04 NOTE — DISCHARGE NOTE PROVIDER - HOSPITAL COURSE
52F with a PMH of chronic pain syndrome (spinal nerve stimulator placed 07/2022, morbid obesity, DM, HTN, CAD, IBS, pseudotumor cerebri who presents to the ED for dyspnea. and worsening edema BLLE   Patient reportedly requiring increased oxygen concentrations at the NH - as high as 10L/min?  Patient concerned as she was started on lasix at the NH however her urination has not increased.  Patient also reports 15 lb weight gain in the last two weeks.      Acute respiratory failure with hypoxia.   2/2 acute diastolic CHF  On NC day time  BIPAP QHS  S/P IV diuresis w/ IV Bumex 2mg BID, dc on PO   cont spironolactone 25mg daily  monitor renal function and electrolytes   cont lisinopril  Added BB   seen by cardio and pulm   after discharge  RHC to confirm R-sided pressures.  Follow up with Dr. Fong    Leg swelling.   Ultrasound negative for DVT of RLE  leg edema sec to CHF    Hypokalemia:  Repleted    Essential hypertension.   Coreg, lisinopril.    CRPS (complex regional pain syndrome) type I.    c/w home meds- oxycodone prn, pregabalin, baclofen.    Chronic anxiety.   sertraline.    Diabetes mellitus.   BG elevated  Hab 1c 7  monitro fsbs/ISS  increase pre meal humalog to 10 unist tid and lantus to 25 U.  Resume home med on dc    Morbid obesity.   seen by CARL  calorie restricted diet.

## 2022-10-04 NOTE — PROGRESS NOTE ADULT - ASSESSMENT
REVIEW OF SYMPTOMS      Able to give ROS  Yes     RELIABLE +/-   CONSTITUTIONAL Weakness Yes  Chills No   ENDOCRINE  No heat or cold intolerance    ALLERGY No hives  Sore throat No stridor  RESP Coughing blood no  Shortness of breath YES   NEURO No Headache  Confusion Pain neck No   CARDIAC No Chest pain No Palpitations   GI  Pain abdomen NO   Vomiting NO     NOTABLE FINDINGS    PHYSICAL EXAM    HEENT Unremarkable  atraumatic   RESP Fair air entry EXP prolonged    Harsh breath sound Resp distres mild   CARDIAC S1 S2 No S3     NO JVD    ABDOMEN SOFT BS PRESENT NOT DISTENDED No hepatosplenomegaly PEDAL EDEMA present No calf tenderness  NO rash       AGE/SEX.   52 f  DOA.  9/22/2022   CC .  9/22/2022 from [facility] with sob fluid retentn incr o2 requiremnt     RECENT HOSPITAL STAY.  7/10-7/23 [facility] Fever cough sob  Rxed mf pneum needed intubatn ards then bpap   extubatd 7/17 needed proni     HOSPITAL COURSE.   Fever 9/23/2022  COPD  Obesity   RO DVR   RO OHS   RO HFPEF    PMH .  pmh Former smoker smoker 45 y quit 2013   pmh marijuana smoker   pmh DM2  pmh Hytn   pmh IBS   pmh CRPS (Complex regional pain syndrome)  pmh spinal nerve stimulatoir 7/8/2022   pms pseudotumor cerebri        GENERAL DATA .   GOC.   9/22/2022 full code     ALLGY.       amitriptiline                       WT.   9/22/2022 156        BMI.    9/22/2022 52                           ICU STAY.  none  COVID.  9/24/2022 scv2 (-)     BEST PRACTICE ISSUES.    HOB ELEVATN. Yes  DVT PPLX. 9/22/2022 hpsc 7500.2      EWING PPLX.   9/22/2022 protonix 40    INFN PPLX.    SP SW DAWIT.   7/19 FEES      DIET.  9/22/2022 cons carb  1.2l              VS/ PO/IO/ VENT/ DRIPS.  10/4/2022 afeb 78 140/70   10/4/2022 ra 94%     ASSESSMENT/RECOMMENDATIONS .   RESP.  SHORTNESS OF BREATH.  Shortness of breath is likely multifactorial.  She had recent ards sec aspn pneum  She has morbid obesity  bmi 52  She may have OHS   She has hfpef  She will need further eval as outpt including pfts and sleep study    COPD.  9/23/2022 spiriva   cont rx    DVT pplx   9/22/2022 Is on hpsc    RESP FAILURE.  9/24/2022 5a 12/5/.6 743/49/147  9/22/2022 CO2 is 27    Target po 90-95% pH 730 (+)  IS ON NOCT BPAP for  STAS   will need abg close to discharge to determine need for home nppv or this can be arranged as outpt at pulm office     RO PE.  D-dimr 9/22/2022 dd 461  ct ch 9/23 no pe  v duplx 9/23 (-)   no vte     PULM HYTN   9/30/2022 echo pasp 44 n l vs and d f  9/30/2022 ph may nbe sec ohs and stas   Needes outpt [place] of ph including rhc to see if she has precapillary ph    INFECTION.  W 9/22-9/23-9/24-9/30/2022 w 14- 10.0 - 9.9 - 9.3    ct ch 9/23 no pe ggo and patchy airspace opac atelectasis bl  and tr pl effs  flu ab 9/22/2022 (-)   rsv 9/22/2022 (-)   rvp 9/24 (-)   CT ch findings likely resolving ards which she  had 7/10-7/23 admission [facility]     CARDIAC.  CAD.  tr 9/22/2022 tr 15  10/3/2022 carvedilol 3125x2   mi ruld out    HYTN.  9/22 nifedipine 60    bp controlld     HFPEF.  echo 7/13/2022 hyperdynamic lvsf n rvsf n  echo 9/28/2022 ef 60% pasp 44  n lv syst and diast fn   bnp 9/22/2022 bnp 87   9/22/2022 lisinopril 10   9/25 spironolact 25   10/3/2022 bumetanide 2  improving        GI.  no active issues    HEMAT.  Hb 9/22-9/24/2022 Hb 10 - 9.9  monitor     RENAL.  Na 9/22-9/28-10/1-10/2-10/4/2022 Na 145 - 137- 133 - 134-131  Cr 9/22-9/27-9/30-10/4/2022 Cr .8 - .9 - 1.1 - .9   moniytor    TIME SPENT   Over 25 minutes aggregate care time spent on encounter; activities included   direct patient care, counseling and/or coordinating care reviewing notes, lab data/ imaging , discussion with multidisciplinary team/ patient  /family and explaining in detail risks, benefits, alternatives  of the recommendations     MARIBEL KELLY 52 f 1970 9/22/2022 DR JUVENCIO COKER

## 2022-10-04 NOTE — DISCHARGE NOTE PROVIDER - NSDCFUSCHEDAPPT_GEN_ALL_CORE_FT
Davin Salas  [facility]  PULMMED [id number]  Scheduled Appointment: 12/14/2022

## 2022-10-04 NOTE — PROGRESS NOTE ADULT - REASON FOR ADMISSION
dyspnea, orthopnea

## 2022-10-04 NOTE — DISCHARGE NOTE PROVIDER - CARE PROVIDER_API CALL
pcp,   Phone: (   )    -  Fax: (   )    -  Follow Up Time:     Tio Fong (MD)  Cardiovascular Disease; Internal Medicine  [address] [id number]  Phone: [phone]  Fax: [phone]  Follow Up Time:

## 2022-10-04 NOTE — DISCHARGE NOTE PROVIDER - NSDCMRMEDTOKEN_GEN_ALL_CORE_FT
baclofen 5 mg oral tablet: 1 tab(s) orally every 8 hours, As needed, Muscle Spasm  benzonatate 100 mg oral capsule: 1 cap(s) orally 3 times a day  bumetanide 2 mg oral tablet: 1 tab(s) orally once a day  carvedilol 3.125 mg oral tablet: 1 tab(s) orally every 12 hours  lisinopril 10 mg oral tablet: 1 tab(s) orally once a day  melatonin 3 mg oral tablet: 1 tab(s) orally once a day (at bedtime), As needed, Sleep  naloxegol 25 mg oral tablet: 1 tab(s) orally once a day  NovoLOG 100 units/mL injectable solution: 10 unit(s) subcutaneous 3 times a day (with meals)  oxyCODONE 15 mg oral tablet: 1 tab(s) orally every 4 hours, As needed, Severe Pain (7 - 10)  pantoprazole 40 mg oral delayed release tablet: 1 tab(s) orally once a day (before a meal)  pregabalin 100 mg oral capsule: 1 cap(s) orally every 8 hours  senna leaf extract oral tablet: 2 tab(s) orally once a day (at bedtime)  sertraline 50 mg oral tablet: 1 tab(s) orally once a day  simethicone 80 mg oral tablet, chewable: 1 tab(s) orally 3 times a day after meals and at bedtime as needed for Gas  spironolactone 25 mg oral tablet: 1 tab(s) orally once a day  tiotropium 18 mcg inhalation capsule: 1 cap(s) inhaled once a day  Tresiba 100 units/mL subcutaneous solution: 80 unit(s) subcutaneous every 72 hours

## 2022-10-04 NOTE — PROGRESS NOTE ADULT - SUBJECTIVE AND OBJECTIVE BOX
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;intercostal neuralgia&quot;)      Cardiac abnormality  (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;cardiac event&quot;; is unclear on diagnosis; denies hx/o MI)      Diabetes      Essential hypertension      IBS (irritable bowel syndrome)      Complex regional pain syndrome type 1      History of cholecystectomy      History of hand surgery  (pt states she had 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12)          FAMILY HISTORY:  FH: HTN (hypertension)        Home Medications:  benzonatate 100 mg oral capsule: 1 cap(s) orally 3 times a day (22 Jul 2022 16:48)  furosemide 40 mg oral tablet: 1 tab(s) orally once a day (22 Jul 2022 16:48)  guaiFENesin 1200 mg oral tablet, extended release: 1 tab(s) orally every 12 hours (22 Jul 2022 16:48)  lisinopril 10 mg oral tablet: 1 tab(s) orally once a day (22 Jul 2022 16:48)  mupirocin 2% topical ointment: 1 application topically 2 times a day (22 Jul 2022 16:48)  naloxegol 25 mg oral tablet: 1 tab(s) orally once a day (22 Jul 2022 16:48)  NIFEdipine 60 mg oral tablet, extended release: 1 tab(s) orally once a day (22 Jul 2022 16:48)  NovoLOG 100 units/mL injectable solution: 10 unit(s) subcutaneous 3 times a day (with meals) (11 Jul 2022 01:34)  oxyCODONE 15 mg oral tablet: 1 tab(s) orally every 4 hours, As needed, Severe Pain (7 - 10) (22 Jul 2022 16:48)  pantoprazole 40 mg oral delayed release tablet: 1 tab(s) orally once a day (before a meal) (22 Jul 2022 16:48)  pregabalin 100 mg oral capsule: 1 cap(s) orally every 8 hours (22 Jul 2022 16:48)  sertraline 50 mg oral tablet: 1 tab(s) orally once a day (22 Jul 2022 16:48)  Tresiba 100 units/mL subcutaneous solution: 80 unit(s) subcutaneous every 72 hours (11 Jul 2022 01:22)      MEDICATIONS  (STANDING):  buMETAnide 2 milliGRAM(s) Oral daily  carvedilol 3.125 milliGRAM(s) Oral every 12 hours  dextrose 5%. 1000 milliLiter(s) (100 mL/Hr) IV Continuous <Continuous>  dextrose 5%. 1000 milliLiter(s) (50 mL/Hr) IV Continuous <Continuous>  dextrose 50% Injectable 25 Gram(s) IV Push once  dextrose 50% Injectable 12.5 Gram(s) IV Push once  dextrose 50% Injectable 25 Gram(s) IV Push once  glucagon  Injectable 1 milliGRAM(s) IntraMuscular once  heparin   Injectable 7500 Unit(s) SubCutaneous every 12 hours  influenza   Vaccine 0.5 milliLiter(s) IntraMuscular once  insulin glargine Injectable (LANTUS) 25 Unit(s) SubCutaneous at bedtime  insulin lispro (ADMELOG) corrective regimen sliding scale   SubCutaneous at bedtime  insulin lispro (ADMELOG) corrective regimen sliding scale   SubCutaneous three times a day before meals  insulin lispro Injectable (ADMELOG) 12 Unit(s) SubCutaneous three times a day before meals  lisinopril 10 milliGRAM(s) Oral daily  pantoprazole    Tablet 40 milliGRAM(s) Oral before breakfast  senna 2 Tablet(s) Oral at bedtime  sertraline 50 milliGRAM(s) Oral daily  spironolactone 25 milliGRAM(s) Oral daily  tiotropium 18 MICROgram(s) Capsule 1 Capsule(s) Inhalation daily    MEDICATIONS  (PRN):  baclofen 5 milliGRAM(s) Oral every 8 hours PRN Muscle Spasm  dextrose Oral Gel 15 Gram(s) Oral once PRN Blood Glucose LESS THAN 70 milliGRAM(s)/deciliter  melatonin 3 milliGRAM(s) Oral at bedtime PRN Sleep  ondansetron Injectable 4 milliGRAM(s) IV Push every 8 hours PRN Nausea and/or Vomiting  oxyCODONE    IR 15 milliGRAM(s) Oral every 4 hours PRN Severe Pain (7 - 10)  simethicone 80 milliGRAM(s) Chew three times a day PRN Gas      Diet, Consistent Carbohydrate w/Evening Snack:   1200mL Fluid Restriction ([id number])  Low Sodium  Liquid Protein Supplement     Qty per Day:  1 (09-27-22 @ 12:07) [Active]          Vital Signs Last 24 Hrs  T(C): 36.6 (04 Oct 2022 00:42), Max: 36.8 (03 Oct 2022 18:29)  T(F): 97.8 (04 Oct 2022 00:42), Max: 98.3 (03 Oct 2022 18:29)  HR: 67 (04 Oct 2022 08:17) (60 - 95)  BP: 146/75 (04 Oct 2022 05:42) (125/68 - 150/81)  BP(mean): --  RR: 20 (04 Oct 2022 00:42) (18 - 20)  SpO2: 98% (04 Oct 2022 08:17) (96% - 100%)    Parameters below as of 04 Oct 2022 00:42  Patient On (Oxygen Delivery Method): BiPAP/CPAP          10-03-22 @ 07:01  -  10-04-22 @ 07:00  --------------------------------------------------------  IN: 680 mL / OUT: 0 mL / NET: 680 mL              LABS:                        11.3   8.62  )-----------( 456      ( 03 Oct 2022 07:33 )             35.9     10-04    131<L>  |  94<L>  |  15  ----------------------------<  277<H>  3.7   |  28  |  0.98    Ca    9.0      04 Oct 2022 07:35  Mg     1.3     10-03    TPro  8.2  /  Alb  3.3  /  TBili  0.5  /  DBili  x   /  AST  19  /  ALT  27  /  AlkPhos  94  10-03              WBC:  WBC Count: 8.62 K/uL (10-03 @ 07:33)  WBC Count: 8.12 K/uL (10-02 @ 05:52)  WBC Count: 9.10 K/uL (10-01 @ 06:42)      MICROBIOLOGY:  RECENT CULTURES:                  Sodium:  Sodium, Serum: 131 mmol/L (10-04 @ 07:35)  Sodium, Serum: 133 mmol/L (10-03 @ 07:33)  Sodium, Serum: 134 mmol/L (10-02 @ 05:52)  Sodium, Serum: 133 mmol/L (10-01 @ 06:42)      0.98 mg/dL 10-04 @ 07:35  1.08 mg/dL 10-03 @ 07:33  1.02 mg/dL 10-02 @ 05:52  1.02 mg/dL 10-01 @ 06:42      Hemoglobin:  Hemoglobin: 11.3 g/dL (10-03 @ 07:33)  Hemoglobin: 11.2 g/dL (10-02 @ 05:52)  Hemoglobin: 10.8 g/dL (10-01 @ 06:42)      Platelets: Platelet Count - Automated: 456 K/uL (10-03 @ 07:33)  Platelet Count - Automated: 420 K/uL (10-02 @ 05:52)  Platelet Count - Automated: 450 K/uL (10-01 @ 06:42)      LIVER FUNCTIONS - ( 03 Oct 2022 07:33 )  Alb: 3.3 g/dL / Pro: 8.2 gm/dL / ALK PHOS: 94 U/L / ALT: 27 U/L / AST: 19 U/L / GGT: x                 RADIOLOGY & ADDITIONAL STUDIES:      MICROBIOLOGY:  RECENT CULTURES:

## 2022-10-04 NOTE — DISCHARGE NOTE NURSING/CASE MANAGEMENT/SOCIAL WORK - NSDCPEFALRISK_GEN_ALL_CORE
For information on Fall & Injury Prevention, visit: [email or web address] OR  [email or web address] OR  https://www.cdc.gov/steadi/patient.html

## 2022-10-06 ENCOUNTER — NON-APPOINTMENT (OUTPATIENT)
Age: 52
End: 2022-10-06

## 2022-10-11 DIAGNOSIS — E66.01 MORBID (SEVERE) OBESITY DUE TO EXCESS CALORIES: ICD-10-CM

## 2022-10-11 DIAGNOSIS — I50.31 ACUTE DIASTOLIC (CONGESTIVE) HEART FAILURE: ICD-10-CM

## 2022-10-11 DIAGNOSIS — Z87.891 PERSONAL HISTORY OF NICOTINE DEPENDENCE: ICD-10-CM

## 2022-10-11 DIAGNOSIS — I11.0 HYPERTENSIVE HEART DISEASE WITH HEART FAILURE: ICD-10-CM

## 2022-10-11 DIAGNOSIS — E11.40 TYPE 2 DIABETES MELLITUS WITH DIABETIC NEUROPATHY, UNSPECIFIED: ICD-10-CM

## 2022-10-11 DIAGNOSIS — F41.9 ANXIETY DISORDER, UNSPECIFIED: ICD-10-CM

## 2022-10-11 DIAGNOSIS — J96.01 ACUTE RESPIRATORY FAILURE WITH HYPOXIA: ICD-10-CM

## 2022-10-11 DIAGNOSIS — D72.829 ELEVATED WHITE BLOOD CELL COUNT, UNSPECIFIED: ICD-10-CM

## 2022-10-11 DIAGNOSIS — E87.6 HYPOKALEMIA: ICD-10-CM

## 2022-10-11 DIAGNOSIS — I25.2 OLD MYOCARDIAL INFARCTION: ICD-10-CM

## 2022-10-11 DIAGNOSIS — K58.9 IRRITABLE BOWEL SYNDROME WITHOUT DIARRHEA: ICD-10-CM

## 2022-10-11 DIAGNOSIS — J44.9 CHRONIC OBSTRUCTIVE PULMONARY DISEASE, UNSPECIFIED: ICD-10-CM

## 2022-10-11 DIAGNOSIS — I25.10 ATHEROSCLEROTIC HEART DISEASE OF NATIVE CORONARY ARTERY WITHOUT ANGINA PECTORIS: ICD-10-CM

## 2022-10-11 DIAGNOSIS — G90.50 COMPLEX REGIONAL PAIN SYNDROME I, UNSPECIFIED: ICD-10-CM

## 2022-10-13 ENCOUNTER — NON-APPOINTMENT (OUTPATIENT)
Age: 52
End: 2022-10-13

## 2022-10-28 ENCOUNTER — NON-APPOINTMENT (OUTPATIENT)
Age: 52
End: 2022-10-28

## 2022-10-28 ENCOUNTER — APPOINTMENT (OUTPATIENT)
Dept: CARDIOLOGY | Facility: CLINIC | Age: 52
End: 2022-10-28

## 2022-10-28 VITALS
OXYGEN SATURATION: 97 % | HEART RATE: 71 BPM | DIASTOLIC BLOOD PRESSURE: 64 MMHG | TEMPERATURE: 98 F | SYSTOLIC BLOOD PRESSURE: 127 MMHG

## 2022-10-28 PROCEDURE — 93000 ELECTROCARDIOGRAM COMPLETE: CPT

## 2022-10-28 PROCEDURE — 99214 OFFICE O/P EST MOD 30 MIN: CPT

## 2022-10-28 RX ORDER — NIFEDIPINE 60 MG/1
60 TABLET, FILM COATED, EXTENDED RELEASE ORAL
Qty: 28 | Refills: 0 | Status: DISCONTINUED | COMMUNITY
Start: 2021-09-20 | End: 2022-10-28

## 2022-10-28 RX ORDER — FUROSEMIDE 40 MG/1
40 TABLET ORAL DAILY
Qty: 30 | Refills: 2 | Status: DISCONTINUED | COMMUNITY
Start: 2020-10-27 | End: 2022-10-28

## 2022-10-28 NOTE — PHYSICAL EXAM
[Well Developed] : well developed [Well Nourished] : well nourished [No Acute Distress] : no acute distress [Normal Conjunctiva] : normal conjunctiva [Normal S1, S2] : normal S1, S2 [No Murmur] : no murmur [No Rub] : no rub [No Gallop] : no gallop [Clear Lung Fields] : clear lung fields [Good Air Entry] : good air entry [No Respiratory Distress] : no respiratory distress  [Soft] : abdomen soft [Non Tender] : non-tender [Abnormal Gait] : abnormal gait [Edema ___] : edema [unfilled] [No Rash] : no rash [No Skin Lesions] : no skin lesions [Moves all extremities] : moves all extremities [No Focal Deficits] : no focal deficits [Normal Speech] : normal speech [Alert and Oriented] : alert and oriented [Normal memory] : normal memory

## 2022-10-28 NOTE — CARDIOLOGY SUMMARY
[de-identified] : 10/28/22: SR, anteroseptal infarct pattern [de-identified] : 9/28/22: \par Summary: \par 1. Left ventricular ejection fraction, by visual estimation, is 60 to  \par 65%. \par 2. Normal left ventricular size and wall thicknesses, with normal  \par systolic and diastolic function. \par 3. Normal trileaflet aortic valve with normal opening. \par 4. Estimated pulmonary artery systolic pressure is 44.2 mmHg assuming a  \par right atrial pressure of 8 mmHg, which is consistent with mild pulmonary  \par hypertension.

## 2022-10-28 NOTE — HISTORY OF PRESENT ILLNESS
[FreeTextEntry1] : 52F with a PMH of chronic pain syndrome (spinal nerve stimulator placed 2022), morbid obesity, DM, HTN, IBS, pseudotumor cerebri who presents to establish outpt cardiac care. \par \par Admitted to [facility] 22-10/4/22 for acute CHF exacerbation and volume overload. Since then she is in rehab undergoing PT.\par \par Her LE edema is much better in the AM and worse at the end of the day\par when she goes out she does not take her diuretic but notes her bumex helps significantly with her volume overload\par \par overall doing better\par going to a  tomorrow - her brother who is around her age just  from an MI. He was an uncontrolled diabetic.\par \par Denies any new MCCALL or orthopnea.\par \par

## 2022-11-14 ENCOUNTER — OUTPATIENT (OUTPATIENT)
Dept: OUTPATIENT SERVICES | Facility: HOSPITAL | Age: 52
LOS: 1 days | End: 2022-11-14
Payer: MEDICARE

## 2022-11-14 ENCOUNTER — TRANSCRIPTION ENCOUNTER (OUTPATIENT)
Age: 52
End: 2022-11-14

## 2022-11-14 VITALS
TEMPERATURE: 98 F | SYSTOLIC BLOOD PRESSURE: 140 MMHG | HEART RATE: 59 BPM | RESPIRATION RATE: 18 BRPM | OXYGEN SATURATION: 100 % | DIASTOLIC BLOOD PRESSURE: 70 MMHG

## 2022-11-14 VITALS
RESPIRATION RATE: 16 BRPM | HEART RATE: 65 BPM | DIASTOLIC BLOOD PRESSURE: 70 MMHG | SYSTOLIC BLOOD PRESSURE: 140 MMHG | OXYGEN SATURATION: 99 %

## 2022-11-14 DIAGNOSIS — I50.22 CHRONIC SYSTOLIC (CONGESTIVE) HEART FAILURE: ICD-10-CM

## 2022-11-14 DIAGNOSIS — Z90.49 ACQUIRED ABSENCE OF OTHER SPECIFIED PARTS OF DIGESTIVE TRACT: Chronic | ICD-10-CM

## 2022-11-14 DIAGNOSIS — Z98.890 OTHER SPECIFIED POSTPROCEDURAL STATES: Chronic | ICD-10-CM

## 2022-11-14 LAB
ANION GAP SERPL CALC-SCNC: 9 MMOL/L — SIGNIFICANT CHANGE UP (ref 5–17)
BUN SERPL-MCNC: 28 MG/DL — HIGH (ref 7–23)
CALCIUM SERPL-MCNC: 9 MG/DL — SIGNIFICANT CHANGE UP (ref 8.4–10.5)
CHLORIDE SERPL-SCNC: 98 MMOL/L — SIGNIFICANT CHANGE UP (ref 96–108)
CO2 SERPL-SCNC: 27 MMOL/L — SIGNIFICANT CHANGE UP (ref 22–31)
CREAT SERPL-MCNC: 0.96 MG/DL — SIGNIFICANT CHANGE UP (ref 0.5–1.3)
EGFR: 71 ML/MIN/1.73M2 — SIGNIFICANT CHANGE UP
GLUCOSE BLDC GLUCOMTR-MCNC: 106 MG/DL — HIGH (ref 70–99)
GLUCOSE BLDC GLUCOMTR-MCNC: 62 MG/DL — LOW (ref 70–99)
GLUCOSE BLDC GLUCOMTR-MCNC: 64 MG/DL — LOW (ref 70–99)
GLUCOSE BLDC GLUCOMTR-MCNC: 66 MG/DL — LOW (ref 70–99)
GLUCOSE BLDC GLUCOMTR-MCNC: 84 MG/DL — SIGNIFICANT CHANGE UP (ref 70–99)
GLUCOSE SERPL-MCNC: 62 MG/DL — LOW (ref 70–99)
HCT VFR BLD CALC: 31.8 % — LOW (ref 34.5–45)
HGB BLD-MCNC: 9.8 G/DL — LOW (ref 11.5–15.5)
MCHC RBC-ENTMCNC: 28.1 PG — SIGNIFICANT CHANGE UP (ref 27–34)
MCHC RBC-ENTMCNC: 30.8 GM/DL — LOW (ref 32–36)
MCV RBC AUTO: 91.1 FL — SIGNIFICANT CHANGE UP (ref 80–100)
NRBC # BLD: 0 /100 WBCS — SIGNIFICANT CHANGE UP (ref 0–0)
PLATELET # BLD AUTO: 491 K/UL — HIGH (ref 150–400)
POTASSIUM SERPL-MCNC: 4.5 MMOL/L — SIGNIFICANT CHANGE UP (ref 3.5–5.3)
POTASSIUM SERPL-SCNC: 4.5 MMOL/L — SIGNIFICANT CHANGE UP (ref 3.5–5.3)
RBC # BLD: 3.49 M/UL — LOW (ref 3.8–5.2)
RBC # FLD: 15.7 % — HIGH (ref 10.3–14.5)
SODIUM SERPL-SCNC: 134 MMOL/L — LOW (ref 135–145)
WBC # BLD: 11.09 K/UL — HIGH (ref 3.8–10.5)
WBC # FLD AUTO: 11.09 K/UL — HIGH (ref 3.8–10.5)

## 2022-11-14 PROCEDURE — 93460 R&L HRT ART/VENTRICLE ANGIO: CPT | Mod: 26

## 2022-11-14 PROCEDURE — 93460 R&L HRT ART/VENTRICLE ANGIO: CPT

## 2022-11-14 PROCEDURE — C1887: CPT

## 2022-11-14 PROCEDURE — 36415 COLL VENOUS BLD VENIPUNCTURE: CPT

## 2022-11-14 PROCEDURE — 82962 GLUCOSE BLOOD TEST: CPT

## 2022-11-14 PROCEDURE — C1889: CPT

## 2022-11-14 PROCEDURE — C1769: CPT

## 2022-11-14 PROCEDURE — C1894: CPT

## 2022-11-14 PROCEDURE — 80048 BASIC METABOLIC PNL TOTAL CA: CPT

## 2022-11-14 PROCEDURE — 93010 ELECTROCARDIOGRAM REPORT: CPT

## 2022-11-14 PROCEDURE — 99152 MOD SED SAME PHYS/QHP 5/>YRS: CPT

## 2022-11-14 PROCEDURE — 93005 ELECTROCARDIOGRAM TRACING: CPT | Mod: XU

## 2022-11-14 PROCEDURE — 85027 COMPLETE CBC AUTOMATED: CPT

## 2022-11-14 RX ORDER — DEXTROSE 50 % IN WATER 50 %
12.5 SYRINGE (ML) INTRAVENOUS ONCE
Refills: 0 | Status: COMPLETED | OUTPATIENT
Start: 2022-11-14 | End: 2022-11-14

## 2022-11-14 RX ORDER — DEXTROSE 50 % IN WATER 50 %
12.5 SYRINGE (ML) INTRAVENOUS ONCE
Refills: 0 | Status: DISCONTINUED | OUTPATIENT
Start: 2022-11-14 | End: 2022-11-28

## 2022-11-14 RX ORDER — METFORMIN HYDROCHLORIDE 850 MG/1
1 TABLET ORAL
Qty: 0 | Refills: 0 | DISCHARGE

## 2022-11-14 RX ORDER — GLUCAGON INJECTION, SOLUTION 0.5 MG/.1ML
1 INJECTION, SOLUTION SUBCUTANEOUS ONCE
Refills: 0 | Status: DISCONTINUED | OUTPATIENT
Start: 2022-11-14 | End: 2022-11-28

## 2022-11-14 RX ORDER — DEXTROSE 50 % IN WATER 50 %
15 SYRINGE (ML) INTRAVENOUS ONCE
Refills: 0 | Status: DISCONTINUED | OUTPATIENT
Start: 2022-11-14 | End: 2022-11-28

## 2022-11-14 RX ORDER — INSULIN DEGLUDEC 100 U/ML
85 INJECTION, SOLUTION SUBCUTANEOUS
Qty: 0 | Refills: 0 | DISCHARGE

## 2022-11-14 RX ORDER — DEXTROSE 50 % IN WATER 50 %
25 SYRINGE (ML) INTRAVENOUS ONCE
Refills: 0 | Status: DISCONTINUED | OUTPATIENT
Start: 2022-11-14 | End: 2022-11-28

## 2022-11-14 RX ORDER — INSULIN ASPART 100 [IU]/ML
10 INJECTION, SOLUTION SUBCUTANEOUS
Qty: 0 | Refills: 0 | DISCHARGE

## 2022-11-14 RX ORDER — SPIRONOLACTONE 25 MG/1
1 TABLET, FILM COATED ORAL
Qty: 0 | Refills: 0 | DISCHARGE

## 2022-11-14 RX ADMIN — Medication 12.5 GRAM(S): at 13:00

## 2022-11-14 NOTE — ASU DISCHARGE PLAN (ADULT/PEDIATRIC) - CARE PROVIDER_API CALL
Tio Fong  Cardiovascular Disease; Internal Medicine  [address] [id number]  Phone: [phone]  Fax: [phone]  Established Patient  Follow Up Time: 2 weeks    Pulmonary doctor,   Phone: (   )    -  Fax: (   )    -  Follow Up Time: 2 weeks

## 2022-11-14 NOTE — ASU DISCHARGE PLAN (ADULT/PEDIATRIC) - ASU DC SPECIAL INSTRUCTIONSFT
Wound Care:   the day AFTER your procedure remove bandage GENTTLY, and clean using  mild soap and gentle warm, water stream, pat dry. leave OPEN to air. YOU MAY SHOWER   DO NOT apply lotions, creams, ointments, powder, parfumes to your incision site  DO NOT SOAK your site for 1 week ( no baths, no pools, no tubs, etc...)  Check  your groin and /or wirst daily.A small amount of bruising, and soarness are normal    ACTIVITY: for 24 hours   - DO NOT DRIVE  - DO NOT make any important decisions or sign legal documents   - DO NOT operate heavy machinaries   - you may resume sexual activity in 48 hours, unless otherwise instructed by your cardiologist       If your procedure was done through the [facility]: for the NEXT 5 DAYS  - Limit climbing stairs, DO NOT soak in bathtub or pool  - no strenous activities, pushing, pulling, straining  - Do not lift anything 10lbs or heavier     MEDICATION:   take your medications as explained ( see discharge paperwork)     Follow heart healthy diet reccomended by your doctor, , if you smoke STOP SMOKING ( may call [phone] for center of tobacco control if you need assistance)     CALL your doctor to make appointment in 2 WEEKS     ***CALL YOUR DOCTOR***  if you experience: fever, chills, body aches, or severe pain, swelling, redness, heat or yellow discharge at incision site  If you experience Bleeding or excruciating pain at the procedural site, sweliing ( golf ball size) at your procedural site  If you experience CHEST PAIN  If you experience extremity numbness, tingling, temperature change ( of your procedural site)   If you are unable to reach your doctor, you may contact:   -Cardiology Office at [facility] at [phone] or   - [facility] [phone]  - [facility] [phone]

## 2022-11-14 NOTE — ASU DISCHARGE PLAN (ADULT/PEDIATRIC) - PROVIDER TOKENS
PROVIDER:[TOKEN:[[id number]],FOLLOWUP:[2 weeks],ESTABLISHEDPATIENT:[T]],FREE:[LAST:[Pulmonary doctor],PHONE:[(   )    -],FAX:[(   )    -],FOLLOWUP:[2 weeks]]

## 2022-11-14 NOTE — ASU DISCHARGE PLAN (ADULT/PEDIATRIC) - NS MD DC FALL RISK RISK
For information on Fall & Injury Prevention, visit: [email or web address] OR  [email or web address] OR  https://www.cdc.gov/steadi/patient.html

## 2022-11-14 NOTE — H&P CARDIOLOGY - HISTORY OF PRESENT ILLNESS
52 year old F with PMHx of morbid obesity, CHF, DMT2, HTN, IBS, pseudotumor cerebri, chronic pain syndrome, presents today for left heart cath and right heart cath.  52 year old F with PMHx of morbid obesity, CHF, DMT2, HTN, IBS, pseudotumor cerebri, chronic pain syndrome, presents today for left heart cath and right heart cath. Pt. states that she recently was diagnosed with CHF. She was experiencing shortness of breath and swelling in her lower legs. Her cardiologist - Tio Fong, referred her for this procedure today. Pt. is anxious about the procedure. She currently denies sob, chest pain, palpitations, headache, n/v/d.     TTE 2022  Summary:   1. Left ventricular ejection fraction, by visual estimation, is 60 to   65%.   2. Normal left ventricular size and wall thicknesses, with normal   systolic and diastolic function.   3. Normal trileaflet aortic valve with normal opening.   4. Estimated pulmonary artery systolic pressure is 44.2 mmHg assuming a   right atrial pressure of 8 mmHg, which is consistent with mild pulmonary   hypertension.      [id number] Tio Fong MD, FACC  Electronically signed on 9/28/2022 at 3:35:46 PM            *** Final ***

## 2022-11-14 NOTE — ASU PATIENT PROFILE, ADULT - FALL HARM RISK - HARM RISK INTERVENTIONS

## 2022-11-15 ENCOUNTER — RX RENEWAL (OUTPATIENT)
Age: 52
End: 2022-11-15

## 2022-12-01 ENCOUNTER — RX RENEWAL (OUTPATIENT)
Age: 52
End: 2022-12-01

## 2022-12-16 ENCOUNTER — RX RENEWAL (OUTPATIENT)
Age: 52
End: 2022-12-16

## 2022-12-29 ENCOUNTER — NON-APPOINTMENT (OUTPATIENT)
Age: 52
End: 2022-12-29

## 2023-01-01 NOTE — DISCUSSION/SUMMARY
[FreeTextEntry1] : difficult JVP assessment given body habitus, though suspect some of her LE edema is in part due to venous insufficiency\par she had at least mildly elevated pulmonary pressures on TTE but otherwise "normal" diastology; ischemia was not ruled out as a contributing factor to her initial CHF exacerbation.\par \par continue her carvedilol, lisinopril, spironolactone\par additional PO bumex for volume optimization - 2mg PO BID x next three days\par weight indications added for prn bumex\par -will check RHC to better evaluate cardiac filling pressures and LHC to rule out ischemic heart disease, janes given her fam hx of premature CAD [EKG obtained to assist in diagnosis and management of assessed problem(s)] : EKG obtained to assist in diagnosis and management of assessed problem(s) Plan:  - routine care, strict I and O, daily weights  - bilirubin prior to discharge   - hearing screen  - CCHD,  screen  - parental education and anticipatory guidance.

## 2023-01-09 NOTE — PROGRESS NOTE ADULT - SUBJECTIVE AND OBJECTIVE BOX
Patient is a 52y old  Female who presents with a chief complaint of dyspnea, orthopnea (03 Oct 2022 09:52)      INTERVAL HPI/OVERNIGHT EVENTS:  Pt was seen and examined, no acute events.      MEDICATIONS  (STANDING):  buMETAnide Injectable 2 milliGRAM(s) IV Push two times a day  dextrose 5%. 1000 milliLiter(s) (50 mL/Hr) IV Continuous <Continuous>  dextrose 5%. 1000 milliLiter(s) (100 mL/Hr) IV Continuous <Continuous>  dextrose 50% Injectable 25 Gram(s) IV Push once  dextrose 50% Injectable 12.5 Gram(s) IV Push once  dextrose 50% Injectable 25 Gram(s) IV Push once  glucagon  Injectable 1 milliGRAM(s) IntraMuscular once  heparin   Injectable 7500 Unit(s) SubCutaneous every 12 hours  influenza   Vaccine 0.5 milliLiter(s) IntraMuscular once  insulin glargine Injectable (LANTUS) 25 Unit(s) SubCutaneous at bedtime  insulin lispro (ADMELOG) corrective regimen sliding scale   SubCutaneous at bedtime  insulin lispro (ADMELOG) corrective regimen sliding scale   SubCutaneous three times a day before meals  insulin lispro Injectable (ADMELOG) 12 Unit(s) SubCutaneous three times a day before meals  lisinopril 10 milliGRAM(s) Oral daily  magnesium sulfate  IVPB 2 Gram(s) IV Intermittent once  NIFEdipine XL 60 milliGRAM(s) Oral daily  pantoprazole    Tablet 40 milliGRAM(s) Oral before breakfast  potassium chloride    Tablet ER 40 milliEquivalent(s) Oral every 4 hours  senna 2 Tablet(s) Oral at bedtime  sertraline 50 milliGRAM(s) Oral daily  spironolactone 25 milliGRAM(s) Oral daily  tiotropium 18 MICROgram(s) Capsule 1 Capsule(s) Inhalation daily    MEDICATIONS  (PRN):  baclofen 5 milliGRAM(s) Oral every 8 hours PRN Muscle Spasm  dextrose Oral Gel 15 Gram(s) Oral once PRN Blood Glucose LESS THAN 70 milliGRAM(s)/deciliter  melatonin 3 milliGRAM(s) Oral at bedtime PRN Sleep  ondansetron Injectable 4 milliGRAM(s) IV Push every 8 hours PRN Nausea and/or Vomiting  oxyCODONE    IR 15 milliGRAM(s) Oral every 4 hours PRN Severe Pain (7 - 10)  simethicone 80 milliGRAM(s) Chew three times a day PRN Gas      Allergies  amitriptyline (Other)        Vital Signs Last 24 Hrs  T(C): 36.6 (03 Oct 2022 10:28), Max: 36.8 (02 Oct 2022 23:45)  T(F): 97.8 (03 Oct 2022 10:28), Max: 98.2 (02 Oct 2022 23:45)  HR: 70 (03 Oct 2022 10:28) (70 - 106)  BP: 124/63 (03 Oct 2022 10:28) (119/58 - 124/63)  BP(mean): --  RR: 18 (03 Oct 2022 10:28) (18 - 19)  SpO2: 96% (03 Oct 2022 10:28) (93% - 99%)    Parameters below as of 03 Oct 2022 10:28  Patient On (Oxygen Delivery Method): nasal cannula        PHYSICAL EXAM:  GENERAL: NAD  HEAD:  Atraumatic  EYES: PERRLA  ENMT: Mouth moist  NECK: supple  NERVOUS SYSTEM:  Awake, alert  CHEST/LUNG: Diminished, on 2L NC  HEART: RRR, non tender  ABDOMEN: Soft, non tender  EXTREMITIES:  2+ edema B/L  SKIN: no Rash      LABS:                        11.3   8.62  )-----------( 456      ( 03 Oct 2022 07:33 )             35.9     10-03    133<L>  |  93<L>  |  16  ----------------------------<  373<H>  3.4<L>   |  32<H>  |  1.08    Ca    9.0      03 Oct 2022 07:33  Mg     1.3     10-03    TPro  8.2  /  Alb  3.3  /  TBili  0.5  /  DBili  x   /  AST  19  /  ALT  27  /  AlkPhos  94  10-03        CAPILLARY BLOOD GLUCOSE      POCT Blood Glucose.: 377 mg/dL (03 Oct 2022 12:33)  POCT Blood Glucose.: 364 mg/dL (03 Oct 2022 12:08)  POCT Blood Glucose.: 395 mg/dL (03 Oct 2022 07:56)  POCT Blood Glucose.: 430 mg/dL (02 Oct 2022 21:57)  POCT Blood Glucose.: 399 mg/dL (02 Oct 2022 17:08)      RADIOLOGY & ADDITIONAL TESTS:    Imaging Personally Reviewed:  [ ] YES  [ ] NO    Consultant(s) Notes Reviewed:  [ ] YES  [ ] NO    Care Discussed with Consultants/Other Providers [ ] YES  [ ] NO Taltz Counseling: I discussed with the patient the risks of ixekizumab including but not limited to immunosuppression, serious infections, worsening of inflammatory bowel disease and drug reactions.  The patient understands that monitoring is required including a PPD at baseline and must alert us or the primary physician if symptoms of infection or other concerning signs are noted.

## 2023-01-22 ENCOUNTER — EMERGENCY (EMERGENCY)
Facility: HOSPITAL | Age: 53
LOS: 0 days | Discharge: HOME | End: 2023-01-23
Payer: MEDICARE

## 2023-01-22 VITALS
WEIGHT: 293 LBS | SYSTOLIC BLOOD PRESSURE: 115 MMHG | RESPIRATION RATE: 26 BRPM | TEMPERATURE: 98 F | HEART RATE: 58 BPM | OXYGEN SATURATION: 95 % | HEIGHT: 68 IN | DIASTOLIC BLOOD PRESSURE: 57 MMHG

## 2023-01-22 DIAGNOSIS — Z79.84 LONG TERM (CURRENT) USE OF ORAL HYPOGLYCEMIC DRUGS: ICD-10-CM

## 2023-01-22 DIAGNOSIS — Z79.4 LONG TERM (CURRENT) USE OF INSULIN: ICD-10-CM

## 2023-01-22 DIAGNOSIS — Z88.8 ALLERGY STATUS TO OTHER DRUGS, MEDICAMENTS AND BIOLOGICAL SUBSTANCES: ICD-10-CM

## 2023-01-22 DIAGNOSIS — Z98.890 OTHER SPECIFIED POSTPROCEDURAL STATES: Chronic | ICD-10-CM

## 2023-01-22 DIAGNOSIS — R31.9 HEMATURIA, UNSPECIFIED: ICD-10-CM

## 2023-01-22 DIAGNOSIS — E11.9 TYPE 2 DIABETES MELLITUS WITHOUT COMPLICATIONS: ICD-10-CM

## 2023-01-22 DIAGNOSIS — M54.50 LOW BACK PAIN, UNSPECIFIED: ICD-10-CM

## 2023-01-22 DIAGNOSIS — I50.9 HEART FAILURE, UNSPECIFIED: ICD-10-CM

## 2023-01-22 DIAGNOSIS — G89.29 OTHER CHRONIC PAIN: ICD-10-CM

## 2023-01-22 DIAGNOSIS — K58.9 IRRITABLE BOWEL SYNDROME WITHOUT DIARRHEA: ICD-10-CM

## 2023-01-22 DIAGNOSIS — R11.0 NAUSEA: ICD-10-CM

## 2023-01-22 DIAGNOSIS — Z90.49 ACQUIRED ABSENCE OF OTHER SPECIFIED PARTS OF DIGESTIVE TRACT: ICD-10-CM

## 2023-01-22 DIAGNOSIS — E78.5 HYPERLIPIDEMIA, UNSPECIFIED: ICD-10-CM

## 2023-01-22 DIAGNOSIS — Z20.822 CONTACT WITH AND (SUSPECTED) EXPOSURE TO COVID-19: ICD-10-CM

## 2023-01-22 DIAGNOSIS — Z90.49 ACQUIRED ABSENCE OF OTHER SPECIFIED PARTS OF DIGESTIVE TRACT: Chronic | ICD-10-CM

## 2023-01-22 DIAGNOSIS — R30.0 DYSURIA: ICD-10-CM

## 2023-01-22 DIAGNOSIS — Z87.442 PERSONAL HISTORY OF URINARY CALCULI: ICD-10-CM

## 2023-01-22 DIAGNOSIS — R19.7 DIARRHEA, UNSPECIFIED: ICD-10-CM

## 2023-01-22 DIAGNOSIS — I11.0 HYPERTENSIVE HEART DISEASE WITH HEART FAILURE: ICD-10-CM

## 2023-01-22 LAB
ALBUMIN SERPL ELPH-MCNC: 3.1 G/DL — LOW (ref 3.3–5)
ALP SERPL-CCNC: 119 U/L — SIGNIFICANT CHANGE UP (ref 40–120)
ALT FLD-CCNC: 46 U/L — SIGNIFICANT CHANGE UP (ref 12–78)
ANION GAP SERPL CALC-SCNC: 6 MMOL/L — SIGNIFICANT CHANGE UP (ref 5–17)
APPEARANCE UR: CLEAR — SIGNIFICANT CHANGE UP
AST SERPL-CCNC: 19 U/L — SIGNIFICANT CHANGE UP (ref 15–37)
BASOPHILS # BLD AUTO: 0.03 K/UL — SIGNIFICANT CHANGE UP (ref 0–0.2)
BASOPHILS NFR BLD AUTO: 0.3 % — SIGNIFICANT CHANGE UP (ref 0–2)
BILIRUB SERPL-MCNC: 0.2 MG/DL — SIGNIFICANT CHANGE UP (ref 0.2–1.2)
BILIRUB UR-MCNC: NEGATIVE — SIGNIFICANT CHANGE UP
BUN SERPL-MCNC: 21 MG/DL — SIGNIFICANT CHANGE UP (ref 7–23)
CALCIUM SERPL-MCNC: 9.1 MG/DL — SIGNIFICANT CHANGE UP (ref 8.5–10.1)
CHLORIDE SERPL-SCNC: 101 MMOL/L — SIGNIFICANT CHANGE UP (ref 96–108)
CO2 SERPL-SCNC: 33 MMOL/L — HIGH (ref 22–31)
COLOR SPEC: YELLOW — SIGNIFICANT CHANGE UP
CREAT SERPL-MCNC: 1.26 MG/DL — SIGNIFICANT CHANGE UP (ref 0.5–1.3)
DIFF PNL FLD: ABNORMAL
EGFR: 51 ML/MIN/1.73M2 — LOW
EOSINOPHIL # BLD AUTO: 0.14 K/UL — SIGNIFICANT CHANGE UP (ref 0–0.5)
EOSINOPHIL NFR BLD AUTO: 1.3 % — SIGNIFICANT CHANGE UP (ref 0–6)
EPI CELLS # UR: SIGNIFICANT CHANGE UP
FLUAV AG NPH QL: SIGNIFICANT CHANGE UP
FLUBV AG NPH QL: SIGNIFICANT CHANGE UP
GLUCOSE SERPL-MCNC: 237 MG/DL — HIGH (ref 70–99)
GLUCOSE UR QL: NEGATIVE MG/DL — SIGNIFICANT CHANGE UP
HCG SERPL-ACNC: <1 MIU/ML — SIGNIFICANT CHANGE UP
HCT VFR BLD CALC: 35 % — SIGNIFICANT CHANGE UP (ref 34.5–45)
HGB BLD-MCNC: 11 G/DL — LOW (ref 11.5–15.5)
IMM GRANULOCYTES NFR BLD AUTO: 0.3 % — SIGNIFICANT CHANGE UP (ref 0–0.9)
KETONES UR-MCNC: NEGATIVE — SIGNIFICANT CHANGE UP
LACTATE SERPL-SCNC: 2.2 MMOL/L — HIGH (ref 0.7–2)
LEUKOCYTE ESTERASE UR-ACNC: NEGATIVE — SIGNIFICANT CHANGE UP
LIDOCAIN IGE QN: 107 U/L — SIGNIFICANT CHANGE UP (ref 73–393)
LYMPHOCYTES # BLD AUTO: 3.8 K/UL — HIGH (ref 1–3.3)
LYMPHOCYTES # BLD AUTO: 34.3 % — SIGNIFICANT CHANGE UP (ref 13–44)
MCHC RBC-ENTMCNC: 28.2 PG — SIGNIFICANT CHANGE UP (ref 27–34)
MCHC RBC-ENTMCNC: 31.4 G/DL — LOW (ref 32–36)
MCV RBC AUTO: 89.7 FL — SIGNIFICANT CHANGE UP (ref 80–100)
MONOCYTES # BLD AUTO: 0.63 K/UL — SIGNIFICANT CHANGE UP (ref 0–0.9)
MONOCYTES NFR BLD AUTO: 5.7 % — SIGNIFICANT CHANGE UP (ref 2–14)
NEUTROPHILS # BLD AUTO: 6.44 K/UL — SIGNIFICANT CHANGE UP (ref 1.8–7.4)
NEUTROPHILS NFR BLD AUTO: 58.1 % — SIGNIFICANT CHANGE UP (ref 43–77)
NITRITE UR-MCNC: NEGATIVE — SIGNIFICANT CHANGE UP
NRBC # BLD: 0 /100 WBCS — SIGNIFICANT CHANGE UP (ref 0–0)
PH UR: 6 — SIGNIFICANT CHANGE UP (ref 5–8)
PLATELET # BLD AUTO: 515 K/UL — HIGH (ref 150–400)
POTASSIUM SERPL-MCNC: 4.2 MMOL/L — SIGNIFICANT CHANGE UP (ref 3.5–5.3)
POTASSIUM SERPL-SCNC: 4.2 MMOL/L — SIGNIFICANT CHANGE UP (ref 3.5–5.3)
PROT SERPL-MCNC: 7.9 GM/DL — SIGNIFICANT CHANGE UP (ref 6–8.3)
PROT UR-MCNC: NEGATIVE MG/DL — SIGNIFICANT CHANGE UP
RBC # BLD: 3.9 M/UL — SIGNIFICANT CHANGE UP (ref 3.8–5.2)
RBC # FLD: 14.3 % — SIGNIFICANT CHANGE UP (ref 10.3–14.5)
RBC CASTS # UR COMP ASSIST: NEGATIVE /HPF — SIGNIFICANT CHANGE UP (ref 0–4)
SARS-COV-2 RNA SPEC QL NAA+PROBE: SIGNIFICANT CHANGE UP
SODIUM SERPL-SCNC: 140 MMOL/L — SIGNIFICANT CHANGE UP (ref 135–145)
SP GR SPEC: 1.01 — SIGNIFICANT CHANGE UP (ref 1.01–1.02)
UROBILINOGEN FLD QL: NEGATIVE MG/DL — SIGNIFICANT CHANGE UP
WBC # BLD: 11.07 K/UL — HIGH (ref 3.8–10.5)
WBC # FLD AUTO: 11.07 K/UL — HIGH (ref 3.8–10.5)
WBC UR QL: NEGATIVE — SIGNIFICANT CHANGE UP

## 2023-01-22 PROCEDURE — 74177 CT ABD & PELVIS W/CONTRAST: CPT | Mod: 26,MA

## 2023-01-22 PROCEDURE — 99285 EMERGENCY DEPT VISIT HI MDM: CPT

## 2023-01-22 RX ORDER — MORPHINE SULFATE 50 MG/1
4 CAPSULE, EXTENDED RELEASE ORAL ONCE
Refills: 0 | Status: DISCONTINUED | OUTPATIENT
Start: 2023-01-22 | End: 2023-01-22

## 2023-01-22 RX ORDER — MORPHINE SULFATE 50 MG/1
6 CAPSULE, EXTENDED RELEASE ORAL ONCE
Refills: 0 | Status: DISCONTINUED | OUTPATIENT
Start: 2023-01-22 | End: 2023-01-22

## 2023-01-22 RX ORDER — METHOCARBAMOL 500 MG/1
1500 TABLET, FILM COATED ORAL ONCE
Refills: 0 | Status: COMPLETED | OUTPATIENT
Start: 2023-01-22 | End: 2023-01-22

## 2023-01-22 RX ORDER — KETOROLAC TROMETHAMINE 30 MG/ML
15 SYRINGE (ML) INJECTION ONCE
Refills: 0 | Status: DISCONTINUED | OUTPATIENT
Start: 2023-01-22 | End: 2023-01-22

## 2023-01-22 RX ORDER — ONDANSETRON 8 MG/1
4 TABLET, FILM COATED ORAL ONCE
Refills: 0 | Status: COMPLETED | OUTPATIENT
Start: 2023-01-22 | End: 2023-01-22

## 2023-01-22 RX ADMIN — MORPHINE SULFATE 4 MILLIGRAM(S): 50 CAPSULE, EXTENDED RELEASE ORAL at 21:32

## 2023-01-22 RX ADMIN — Medication 15 MILLIGRAM(S): at 20:27

## 2023-01-22 RX ADMIN — MORPHINE SULFATE 6 MILLIGRAM(S): 50 CAPSULE, EXTENDED RELEASE ORAL at 18:06

## 2023-01-22 RX ADMIN — MORPHINE SULFATE 4 MILLIGRAM(S): 50 CAPSULE, EXTENDED RELEASE ORAL at 22:00

## 2023-01-22 RX ADMIN — ONDANSETRON 4 MILLIGRAM(S): 8 TABLET, FILM COATED ORAL at 18:05

## 2023-01-22 RX ADMIN — METHOCARBAMOL 1500 MILLIGRAM(S): 500 TABLET, FILM COATED ORAL at 21:54

## 2023-01-22 NOTE — ED ADULT NURSE NOTE - OBJECTIVE STATEMENT
Presented to ER pamela from [facility] c/o rt flank pain since Thursday denies dysuria nor fever

## 2023-01-22 NOTE — ED PROVIDER NOTE - OBJECTIVE STATEMENT
52-year-old female with PMH morbid obesity, CHF, HTN, HLD, DM, IBS, pseudotumor cerebri, kidney stones, cholecystectomy, chronic back pain/complex regional pain syndrome/spinal stimulator/chronic oxycodone use presents from [facility] for moderate constant throbbing non-radiating R lower back pain associated with nausea, diarrhea x 4 days.   +mild dysuria.  her snf told her they can manage her there and started her on bactrim prophylactically pending UA result, but she came to Ed as pain was too severe.  no cp, sob, ab pain, vomiting, fever, vaginal sxs.  denies saddle anesthesia, bowel/bladder dysfunction, difficulty ambulating, paraesthesias, direct trauma, hx IVDA, recent injections, weakness, hx back surgeries, unexplained wt loss.

## 2023-01-22 NOTE — ED PROVIDER NOTE - CLINICAL SUMMARY MEDICAL DECISION MAKING FREE TEXT BOX
+back pain.   UA with small blood, no uti, although possibly masked by 1 dose bactrim today.  CT without abd path.   rpt abd exam nontender.  Reviewed all results and necessity for follow up. Counseled on red flags and to return for them.  Patient appears well on discharge.

## 2023-01-22 NOTE — ED ADULT NURSE NOTE - INTERVENTIONS DEFINITIONS
[place] to call system/Call bell, personal items and telephone within reach/Instruct patient to call for assistance/Physically safe environment: no spills, clutter or unnecessary equipment/Review medications for side effects contributing to fall risk

## 2023-01-22 NOTE — ED PROVIDER NOTE - PROGRESS NOTE DETAILS
JACKSON RIGGINS: CT reading noted, pt without hypoxia, sob, cp, upper abd pain. Reviewed all results and necessity for follow up. Counseled on red flags and to return for them.  Patient appears well on discharge.

## 2023-01-22 NOTE — ED PROVIDER NOTE - NS ED ROS FT
Review of Systems    Constitutional: (-) fever   Eyes/ENT: (-) vision changes  Cardiovascular: (-) chest pain, (-) syncope (-) palpitations  Respiratory: (-) cough, (-) shortness of breath  Gastrointestinal: (-) vomiting, (+) diarrhea (-)black/bloody stools (-) abdominal pain  Genitourinary:  (-) dysuria   Musculoskeletal: (-) neck pain, (+) back pain, (-) leg pain/swelling  Integumentary: (-) rash, (-) edema  Neurological: (-) headache, (-) confusion  Hematologic: (-) easy bruising

## 2023-01-22 NOTE — ED PROVIDER NOTE - PHYSICAL EXAMINATION
PHYSICAL EXAM:    GENERAL: Alert, appears stated age, well appearing, non-toxic  SKIN: Warm, pink and dry. MMM.   HEAD: NC, AT   EYE: Normal lids/conjunctiva  ENT: Normal hearing, patent oropharynx   NECK: +supple. No meningismus, or JVD  Pulm: Bilateral BS, normal resp effort, no wheezes, stridor, or retractions  CV: RRR, no M/R/G, 2+and = radial pulses  Abd: soft, non-tender, non-distended, no rebound/guarding. +R CVA tenderness. no l cva tenderness  Mskel: no erythema, cyanosis, edema. no calf tenderness. no spinal ttp. +R paralumbar/paracervical ttp. no rash   Neuro: AAOx3, no sensory/motor deficits, 5/5 strength throughout.

## 2023-01-22 NOTE — ED ADULT NURSE REASSESSMENT NOTE - NS ED NURSE REASSESS COMMENT FT1
Pt Discharged to [facility]. Discharge papers given. Pt awaiting transport. Rn spoke to Juan Gonzalez [phone] ext [phone] and Nursing manager.
pt sleeping comfortable, on monitor bed.
pt sleeping waiting for ambullete
Pt A&OX4, c/o right flank pain. Provider Ricardo made aware. Toradol 15 mg IV to be given. Pain management in place. Close monitoring in place.

## 2023-01-23 VITALS
TEMPERATURE: 98 F | DIASTOLIC BLOOD PRESSURE: 66 MMHG | SYSTOLIC BLOOD PRESSURE: 114 MMHG | OXYGEN SATURATION: 95 % | RESPIRATION RATE: 18 BRPM | HEART RATE: 59 BPM

## 2023-01-23 LAB
CULTURE RESULTS: SIGNIFICANT CHANGE UP
SPECIMEN SOURCE: SIGNIFICANT CHANGE UP

## 2023-02-02 NOTE — ED PROVIDER NOTE - NS_BEDUNITTYPES_ED_ALL_ED
Valarie Hannon MA 2/2/2023 8:42 AM EST      ----- Message -----  From: Diana Bianchi  Sent: 2/1/2023 10:44 PM EST  To: Angie Barr [facility]  Subject: fatigue and low heart rate     last friday jan 27 went to [employer] and got the first shingle shot and had a reaction with fast heart rate and some dizziness. Sat there for 45 minutes till felt better to drive home. Since then fatigue and some fogginess daily but today Feb low heart rate in high 40 like 47, 48. Wanted you to know if case there is something I need to do, will keep track of rate tomorrow. BP running 140 to 144/79. FYI     MEDICINE

## 2023-02-06 ENCOUNTER — NON-APPOINTMENT (OUTPATIENT)
Age: 53
End: 2023-02-06

## 2023-02-08 ENCOUNTER — NON-APPOINTMENT (OUTPATIENT)
Age: 53
End: 2023-02-08

## 2023-02-22 NOTE — ED ADULT NURSE NOTE - PMH
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

## 2023-03-02 ENCOUNTER — RX RENEWAL (OUTPATIENT)
Age: 53
End: 2023-03-02

## 2023-03-07 ENCOUNTER — NON-APPOINTMENT (OUTPATIENT)
Age: 53
End: 2023-03-07

## 2023-03-07 ENCOUNTER — APPOINTMENT (OUTPATIENT)
Dept: CARDIOLOGY | Facility: CLINIC | Age: 53
End: 2023-03-07

## 2023-03-07 ENCOUNTER — APPOINTMENT (OUTPATIENT)
Dept: CARDIOLOGY | Facility: CLINIC | Age: 53
End: 2023-03-07
Payer: MEDICARE

## 2023-03-07 VITALS
BODY MASS INDEX: 44.41 KG/M2 | HEIGHT: 68 IN | DIASTOLIC BLOOD PRESSURE: 74 MMHG | HEART RATE: 64 BPM | OXYGEN SATURATION: 96 % | SYSTOLIC BLOOD PRESSURE: 146 MMHG | WEIGHT: 293 LBS

## 2023-03-07 VITALS — SYSTOLIC BLOOD PRESSURE: 128 MMHG | DIASTOLIC BLOOD PRESSURE: 72 MMHG

## 2023-03-07 PROCEDURE — 99214 OFFICE O/P EST MOD 30 MIN: CPT

## 2023-03-07 PROCEDURE — 93000 ELECTROCARDIOGRAM COMPLETE: CPT

## 2023-03-07 NOTE — DISCUSSION/SUMMARY
[FreeTextEntry1] : No evidence of active cardiac ischemia, uncontrolled arrhythmia, or decompensated heart failure.\par Recent cardiac catheterization was without CAD.\par \par No cardiac contraindication to the planned procedure.\par Continue betablocker in the perioperative period.\par \par Continue current medications. additional 2mg PO bumex as needed for volume overload. She has mild chronic LE edema which is in part due to venous insufficiency. [EKG obtained to assist in diagnosis and management of assessed problem(s)] : EKG obtained to assist in diagnosis and management of assessed problem(s)

## 2023-03-07 NOTE — REVIEW OF SYSTEMS
[Feeling Fatigued] : feeling fatigued [Dyspnea on exertion] : dyspnea during exertion [Negative] : Heme/Lymph [Lower Ext Edema] : lower extremity edema [Joint Pain] : joint pain [Tingling (Paresthesia)] : tingling [SOB] : no shortness of breath [Chest Discomfort] : no chest discomfort [Leg Claudication] : no intermittent leg claudication [Palpitations] : no palpitations [Orthopnea] : no orthopnea [PND] : no PND [Syncope] : no syncope

## 2023-03-07 NOTE — PHYSICAL EXAM
[Well Developed] : well developed [Well Nourished] : well nourished [No Acute Distress] : no acute distress [Normal Conjunctiva] : normal conjunctiva [Normal S1, S2] : normal S1, S2 [No Murmur] : no murmur [No Rub] : no rub [No Gallop] : no gallop [Clear Lung Fields] : clear lung fields [Good Air Entry] : good air entry [No Respiratory Distress] : no respiratory distress  [Soft] : abdomen soft [Non Tender] : non-tender [Abnormal Gait] : abnormal gait [Edema ___] : edema [unfilled] [No Rash] : no rash [No Skin Lesions] : no skin lesions [Moves all extremities] : moves all extremities [No Focal Deficits] : no focal deficits [Normal Speech] : normal speech [Alert and Oriented] : alert and oriented [Normal memory] : normal memory [Normal Venous Pressure] : normal venous pressure

## 2023-03-07 NOTE — HISTORY OF PRESENT ILLNESS
[FreeTextEntry1] : 52F with a PMH of chronic pain syndrome, morbid obesity, DM, HTN, HFpEF, IBS\par \par Her LE edema is much better in the AM and worse at the end of the day\par when she goes out she does not take her diuretic but notes her bumex helps significantly with her volume overload\par \par ambulates with rollator\par \par Denies any new MCCALL or orthopnea. has occ LE edema\par \par She is going for spinal cord stimular\par Dr. Elizabeth Palmer\par [facility]\par Date of procedure: 3/21/23

## 2023-03-07 NOTE — CARDIOLOGY SUMMARY
[de-identified] : 3/7/23: NSR\par 10/28/22: SR, anteroseptal infarct pattern [de-identified] : 9/28/22: \par Summary: \par 1. Left ventricular ejection fraction, by visual estimation, is 60 to  \par 65%. \par 2. Normal left ventricular size and wall thicknesses, with normal  \par systolic and diastolic function. \par 3. Normal trileaflet aortic valve with normal opening. \par 4. Estimated pulmonary artery systolic pressure is 44.2 mmHg assuming a  \par right atrial pressure of 8 mmHg, which is consistent with mild pulmonary  \par hypertension.  [de-identified] : 11/14/22: C - no CAD

## 2023-03-08 ENCOUNTER — APPOINTMENT (OUTPATIENT)
Dept: PULMONOLOGY | Facility: CLINIC | Age: 53
End: 2023-03-08
Payer: MEDICARE

## 2023-03-08 VITALS
WEIGHT: 293 LBS | TEMPERATURE: 98 F | HEIGHT: 68 IN | BODY MASS INDEX: 44.41 KG/M2 | RESPIRATION RATE: 16 BRPM | HEART RATE: 67 BPM | OXYGEN SATURATION: 96 % | DIASTOLIC BLOOD PRESSURE: 78 MMHG | SYSTOLIC BLOOD PRESSURE: 154 MMHG

## 2023-03-08 DIAGNOSIS — Z01.818 ENCOUNTER FOR OTHER PREPROCEDURAL EXAMINATION: ICD-10-CM

## 2023-03-08 PROCEDURE — 99214 OFFICE O/P EST MOD 30 MIN: CPT

## 2023-03-08 PROCEDURE — 99204 OFFICE O/P NEW MOD 45 MIN: CPT

## 2023-03-08 RX ORDER — OXYCODONE HYDROCHLORIDE 15 MG/1
15 TABLET ORAL
Qty: 56 | Refills: 0 | Status: DISCONTINUED | COMMUNITY
Start: 2023-03-03

## 2023-03-08 NOTE — ASSESSMENT
[FreeTextEntry1] : \par ___________\par PATIENT PRESENTATION\par ______________\par NAME.  MARIBEL KELLY                  \par AGE.   52 (3/8/2023) \par .  1970 \par SEX.   f \par CONTACT. [phone] \par PCP. DR BAILEE SINGH \par REFERRING MD.  DR BAILEE SINGH \par INITIAL VISIT DATE . 3/8/2023 \par HABITS.\par .. quit smoking \par .. 1 ppd \par .. started smoking age 9 \par .. 30 pyh \par ALLERGY. \par .. nkda \par HOSPITAL ADMISSIONS.\par .. -2023 [facility] VS\par .. -10/4/2022 [facility] \par .. 7/ [facility] \par HISTORY. \par .. 52 f initial visit 3/8/2023 for preop pulm eval for neurostimulator insertion at [facility] \par .. I had managed her as Pulm consultant during her 2022 admission at [facility] VS \par \par \par WORKUP.\par CBC\par .. 2023 w 11 Hb 11 Plt 515 \par SMA7\par .. 2023 Na 140 Cr 1.2 \par V duplx \par .. 2022 V duplx (-) \par CXR \par .. 2022 CXR mid pvc \par PFTS\par .. 2023 roger (done [facility])\par .... Spirometry n \par .... No signi improved after bd \par .... Lung volume n\par .... Diffusion n \par SLEEP STUDY\par .. SLEEP APNEA TESTING\par .... 3/8/2023 Patient states that she had sleep study done in [place] and was told that she does not have sleep apena \par ECHO\par .. 2022 ef 60% pasp 44 n lv syst and wilks functn \par MEDS \par .. 3/8/2023 Noct BPAP .35/12/5\par .. 3/8/2023 atrovent \par .. 3/8/2023 Baclofen 5.3 \par .. 3/8/2023 Bumetanide 2 \par .. 3/8/2023 spironolactone 25\par .. 3/8/2023 lisinopril 10\par .. 3/8/2023 Carvedilol 3125x2 \par .. 3/8/2023 duloxetine 30 \par \par \par \par ___________\par PATIENT DATA\par ______________ \par TRAVEL. \par .. No \par OCCUPN. \par .. worked for [employer] in past  and has also worked as  x 3y    \par PETS.  \par .. no  \par ANY FAM HO CANCER. \par .. breast cancer mothers side \par ANY FAMILY HO VTE. \par .. no \par ANY FAM HO ASTHMA. \par .. no\par BIRTHPLACE.   US                            \par ANY ASBESTOS EXPOSURE. no\par ANY TB EXPOSURE. no \par ANY PAST HO CANCER. no \par ANY PAST HO VTE. no \par ANY PAST HO ASTHMA. \par .. had asthma as child  \par ANY PAST HO STAS.  \par .. 3/8/2023 denies \par ANY PAST HO CAD.\par .. No \par ANY PAST HO CHF.\par .. hfpef\par \par ___________\par PROBLEM LIST.\par ___________\par Respiratory acidosis\par .. Is on noct BPAP which was started during [facility] hosp [facility] 7/10/2022\par .. 2022 bpap /.6 743/46/147\par RO VTE.\par .. 2022 D dimr 461 \par ..  ct ch No pe \par ..  v duplx (-) \par Pulmonary hypertension.\par .. 2022 echo pasp 44 n lvsf n lvdf \par Former smoker\par .. smoked 45 y\par .. quit  \par Marijuana smoker \par .. 3/8/2023 3-4/d pain management \par Dysphagia.\par .. 2022 FEES recommended reg solids thin liqs \par ARDS.\par .. 2022 spc trial at [facility]\par .. 2022  mf pneum needed MICU prone position \par .. Intubated -2022 \par .. ct ch 2022 no pe ggo and patchy airspace opac atelectasis bl \par HFPEF.\par .. echo 2022 ef 60% pasp 44 n lvsf n lvdf \par Hytn.\par DM.\par Complex regional pain syndrome Tyoe 1 \par .. 2023 Neurostimulator placed [facility]\par Pseudotumor cerebri \par Bipolar. \par \par \par ___________\par ASSESSMENT/RECOMMENDATIONS.\par ___________\par \par IMMUNIZATION.\par .. Advised to remain up to date with covid vaccination\par HOME OXYGEN.\par .. 3/8/2023 ra rest 96%\par .. 3/8/2023 Patient has nocturnal bpap started at [facility] 2023 for hypercapnic resp failure\par .. 3/8/2023 advised to continue using that till formal sleep study results available \par OBESITY.\par .. 3/8/2023 384 lb\par .. 3/8/2023 bmi 58\par LUNG CANCER SCREENING. \par .. age 3/8/2023 52 \par .. quit smoking \par .. 1 ppd \par .. started smoking age 9 \par .. 30 pyh \par .. 3/8/2023 will need ldct after her current procedure gets done with \par .. 3/8/2023 dw pt \par SMOKING.\par .. quit smoking \par .. 1 ppd \par .. started smoking age 9 \par .. 30 pyh \par .. 3/8/2023 no longer smoking \par .. 3/8/2023 stopped smoking  marijuana \par SLEEP APNEA.\par .. 3/8/2023 states she has been tested and was told is negative \par .. 3/8/2023 no recorsd available \par \par ___________\par PROBLEM DETAILS.\par ___________\par \par PREOP PULMONARY EVALUATION \par .. 3/8/2023 Going for nerostimulator procedure at [facility] scheduled for 3/21/2023 \par .. 3/8/2023 BEDOLLA as noted above \par .. MMRC \par ….. 3/8/2023 MMRC 1  Can walk 3 flight stairs \par .. 3/8/2023 Uses bpap at time of sleep\par .. 3/8/2023 Not on home oxygen \par .. 3/8/2023 has not required to use inhaler in months but gets atrovent inhaler every morning at [facility]\par .. 3/8/2023 uses rollater to walk because of the pain\par ___________\par ASSESSMENT\par PREOP PULM\par ___________\par \par \par .. ARISCAT SCORING 3/8/2023 \par …... AGE 52 score 3 \par …... PREOP PULSE PX score 1\par ...... SURGERY DURATION score 1\par \par .. 3/8/2023 ARISCAT score is 5 which falls in the low risk indicating 1.6% risk of pulm complications\par \par \par \par \par FUNCTIONAL STATUS\par .. 3/8/2023 Can climb up 3 fligh stairs \par .. 3/8/2023 This is over 4 mets \par \par SLEEP APNEA\par .. 3/8/2023 Not present as per patient \par \par RCRI \par ..3/8/2023 Score 2 \par …..... Insulin\par …..... HO CHF \par \par SMOKING\par .. 3/8/2023 Nonsmoker\par .. 3/8/2023 Former smoker  \par …..... quit \par ........ 34 pyh \par \par OVERALL\par .. 3/8/2023 Patient is at above average risk of periop pulmonary complications but is in optimal pulm status and is cleared accordingly\par \par RISK MITIGATION.\par .. 3/8/2023 Use periop bronchodilators janes if wheeze\par .. 3/8/2023 Use noct BPAP which she is accustomed to \par .. 3/8/2023 Cardiac monitoring and capnography in immediate postop period\par

## 2023-03-08 NOTE — REASON FOR VISIT
[Initial] : an initial visit [Pre-op Risk Stratification] : pre-op risk stratification [TextBox_13] : Dr. none

## 2023-03-08 NOTE — PHYSICAL EXAM
[No Acute Distress] : no acute distress [Normal Oropharynx] : normal oropharynx [Normal Appearance] : normal appearance [No Neck Mass] : no neck mass [Normal Rate/Rhythm] : normal rate/rhythm [No Resp Distress] : no resp distress [No Abnormalities] : no abnormalities [Benign] : benign [Oriented x3] : oriented x3 [Normal Affect] : normal affect [TextBox_2] : morbid obesity

## 2023-03-16 ENCOUNTER — RX RENEWAL (OUTPATIENT)
Age: 53
End: 2023-03-16

## 2023-03-21 ENCOUNTER — RX RENEWAL (OUTPATIENT)
Age: 53
End: 2023-03-21

## 2023-03-22 ENCOUNTER — APPOINTMENT (OUTPATIENT)
Dept: PULMONOLOGY | Facility: CLINIC | Age: 53
End: 2023-03-22

## 2023-03-27 ENCOUNTER — NON-APPOINTMENT (OUTPATIENT)
Age: 53
End: 2023-03-27

## 2023-04-14 ENCOUNTER — NON-APPOINTMENT (OUTPATIENT)
Age: 53
End: 2023-04-14

## 2023-04-19 ENCOUNTER — APPOINTMENT (OUTPATIENT)
Dept: PULMONOLOGY | Facility: CLINIC | Age: 53
End: 2023-04-19

## 2023-04-26 NOTE — PROGRESS NOTE ADULT - PROBLEM SELECTOR PLAN 1
- 2/2 multifocal pna  - cont bipap 12/8, 70% fio2  - in respiratory distress. will get micu consult for increased work of breathing  - full code  - pt requests her daughter Thea Matta or pt's sister to make medical decisions for pt if pt unable to [Fully active, able to carry on all pre-disease performance without restriction] : Status 0 - Fully active, able to carry on all pre-disease performance without restriction [Normal] : affect appropriate [de-identified] : left healed axillary and lumpectomy scar

## 2023-05-09 ENCOUNTER — NON-APPOINTMENT (OUTPATIENT)
Age: 53
End: 2023-05-09

## 2023-05-10 ENCOUNTER — APPOINTMENT (OUTPATIENT)
Dept: PULMONOLOGY | Facility: CLINIC | Age: 53
End: 2023-05-10

## 2023-06-01 ENCOUNTER — APPOINTMENT (OUTPATIENT)
Dept: NEUROLOGY | Facility: CLINIC | Age: 53
End: 2023-06-01
Payer: MEDICARE

## 2023-06-01 VITALS
DIASTOLIC BLOOD PRESSURE: 62 MMHG | WEIGHT: 293 LBS | HEART RATE: 62 BPM | HEIGHT: 68 IN | SYSTOLIC BLOOD PRESSURE: 147 MMHG | BODY MASS INDEX: 44.41 KG/M2

## 2023-06-01 DIAGNOSIS — G93.2 BENIGN INTRACRANIAL HYPERTENSION: ICD-10-CM

## 2023-06-01 PROCEDURE — 99215 OFFICE O/P EST HI 40 MIN: CPT

## 2023-06-01 RX ORDER — ACETAZOLAMIDE 500 MG/1
500 CAPSULE ORAL DAILY
Qty: 90 | Refills: 1 | Status: DISCONTINUED | COMMUNITY
Start: 2022-04-13 | End: 2023-06-01

## 2023-06-01 RX ORDER — PREGABALIN 150 MG/1
150 CAPSULE ORAL
Qty: 56 | Refills: 0 | Status: DISCONTINUED | COMMUNITY
Start: 2022-01-07 | End: 2023-06-01

## 2023-06-01 RX ORDER — SERTRALINE HYDROCHLORIDE 50 MG/1
50 TABLET, FILM COATED ORAL DAILY
Qty: 90 | Refills: 3 | Status: DISCONTINUED | COMMUNITY
Start: 2020-10-27 | End: 2023-06-01

## 2023-06-01 NOTE — HISTORY OF PRESENT ILLNESS
[FreeTextEntry1] : She is a 53 year old lady.\par \par Summary from Dr. 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  Also has history of hypertension, diabetes mellitus type 2 and irritable bowel syndrome.  Her extensive medical history reviewed in our electronic health record.\par \par She denies visual obscurations.  Currently concerned about her memory and occasional word finding difficulty.  Patient reports a sleep study was negative for obstructive sleep apnea but reported frequent oxygen desaturations.\par \par She was sent for spinal tap and the opening pressure was 34 cm.  She was started 2 months ago on acetazolamide 500 mg twice daily.  She claimed that dosage aggravated her irritable bowel syndrome and caused her to have more frequent diarrhea.  She reduced the dosage to 500 mg once daily.  She also has been having increasing right sided intercostal neuralgia and her pain management doctor wants to do a trial of a spinal stimulator next month.  She has been referred to bariatric surgeon to consider feasibility of laparoscopic bariatric procedure to lose weight.\par \par 6/1/23\par She presents to the office today. She has been off the Diamox since August 2022. In April, she developed a recurrence of pulsatile tinnitus, and states that she "sees her heart beat in her eyes", and feels that her balance is impaired. She was recently seen by ophthalmology and told she had mildly increased pressure in the eyes, but nothing worrisome.\par \par \par PCP NP Reba Montez

## 2023-06-01 NOTE — DISCUSSION/SUMMARY
[FreeTextEntry1] : Overall, she is neurologically intact.\par \par To summarize, she had idiopathic intracranial hypertension in April 2022 and was treated with Acetazolamide, which she stopped in August 2022. In April 2023, she developed a recurrence of pulsatile tinnitus, and "seeing my heartbeat in my eyes". She was seen by ophthalmology and told she had mildly increased intraocular pressure. Her presentation sounds consistent with a recurrence of idiopathic intracranial hypertension. \par \par Plan:\par - MRI brain with out contrast.\par - Referral to neuroophthalmology to assess for papilledema. \par - Start Acetazolamide. ( 125mg daily in week 1, then 250 mg daily in week 2, then 375 mg daily in week 3, then 500mg daily in week 4). I encouraged her to increase potassium in her diet, while on Acetazolamide.\par - We discussed the importance of weight loss and that it is the most effective way to treat IIH.\par \par She can follow up in 3-4 weeks.

## 2023-06-01 NOTE — PHYSICAL EXAM
[General Appearance - Alert] : alert [General Appearance - In No Acute Distress] : in no acute distress [Oriented To Time, Place, And Person] : oriented to person, place, and time [Impaired Insight] : insight and judgment were intact [Affect] : the affect was normal [Sclera] : the sclera and conjunctiva were normal [Extraocular Movements] : extraocular movements were intact [Full Visual Field] : full visual field [Outer Ear] : the ears and nose were normal in appearance [Examination Of The Oral Cavity] : the lips and gums were normal [Neck Appearance] : the appearance of the neck was normal [Heart Sounds] : normal S1 and S2 [Abnormal Walk] : normal gait [Nail Clubbing] : no clubbing  or cyanosis of the fingernails [Musculoskeletal - Swelling] : no joint swelling seen [Motor Tone] : muscle strength and tone were normal [Skin Color & Pigmentation] : normal skin color and pigmentation [Skin Turgor] : normal skin turgor [] : no rash [FreeTextEntry1] : Neurologic examination:\par \par Mental status:\par \par The patient is alert, attentive, and oriented. Speech is clear and fluent with good comprehension.\par \par Cranial nerves:\par \par CN II: Visual fields are full to confrontation. Pupils are 4 mm and briskly reactive to light. bilaterally.\par \par CN III, IV, VI: At primary gaze, there is no eye deviation.EOMI. No ptosis\par \par CN V: Facial sensation is intact bilaterally.\par \par CN VII: Face is symmetric with normal eye closure and smile.\par \par CN VII: Hearing is grossly intact.\par \par CN IX, X: Palate elevates symmetrically. Phonation is normal.\par \par CN XI: Head turning and shoulder shrug are intact\par \par CN XII: Tongue is midline with normal movements and no atrophy.\par \par Motor:\par \par There is no pronator drift of out-stretched arms. Muscle bulk and tone are normal. Strength is full bilaterally.\par \par Sensory:\par \par Light touch intact in fingers and toes, but feels it more on the right than left(chronic).\par \par Coordination:\par \par Fine finger movements are intact. There is no dysmetria on finger-to-nose. \par \par Gait/Stance:\par \par Normal gait.

## 2023-06-07 ENCOUNTER — APPOINTMENT (OUTPATIENT)
Dept: PULMONOLOGY | Facility: CLINIC | Age: 53
End: 2023-06-07

## 2023-06-15 ENCOUNTER — APPOINTMENT (OUTPATIENT)
Dept: DERMATOLOGY | Facility: CLINIC | Age: 53
End: 2023-06-15
Payer: MEDICARE

## 2023-06-15 DIAGNOSIS — D48.9 NEOPLASM OF UNCERTAIN BEHAVIOR, UNSPECIFIED: ICD-10-CM

## 2023-06-15 DIAGNOSIS — L73.2 HIDRADENITIS SUPPURATIVA: ICD-10-CM

## 2023-06-15 PROCEDURE — 99214 OFFICE O/P EST MOD 30 MIN: CPT

## 2023-06-29 ENCOUNTER — APPOINTMENT (OUTPATIENT)
Dept: PLASTIC SURGERY | Facility: CLINIC | Age: 53
End: 2023-06-29
Payer: MEDICARE

## 2023-06-29 VITALS — BODY MASS INDEX: 44.41 KG/M2 | WEIGHT: 293 LBS | HEIGHT: 68 IN

## 2023-06-29 PROCEDURE — 99213 OFFICE O/P EST LOW 20 MIN: CPT

## 2023-06-29 PROCEDURE — 99203 OFFICE O/P NEW LOW 30 MIN: CPT

## 2023-06-29 NOTE — HISTORY OF PRESENT ILLNESS
[FreeTextEntry1] : 53 year old woman presenting for a right cheek lesion. Patient was seen by dermatology 2 weeks ago for a growing right cheek mass and was referred to plastic surgery for possible excision and biopsy.\par She states one year ago she noticed a small nodule on her right cheek after waking up from a medically induced coma. She notes the lump disappeared and then four months ago she noticed a growing lump on her right cheek. She states the mass has tripled in size over the past four months. She states the area is tender and sometimes becomes red and warm, and sometimes notes itchiness. She denies any pus or drainage from the mass. She notes she has recently been experiencing occasional tenderness of her neck lymph nodes, right-sided headaches, tingling on the right side of her face, brain fog, unstable gait, difficulty speaking, loss of appetite, and unintentional weight loss.\par No imaging or biopsy has been performed on the mass.\par Family history of breast cancer in mother, maternal GM, maternal GGM\par \par PMH: hidradenitis, type 2 diabetes, HTN, depression, IBS, congestive heart failure\par PSH: patient was in a medically-induced coma from 7/9/22-7/18/22 for acute respiratory distress secondary to a complication from spinal cord stimulator placement, excision of right hand cyst, cholecystectomy\par Medications: acetazolamide, baclofen, bumetanide, carvedilol, clindamycin topical, duloxetine, gabapentin, humalog, ibuprofen, ipratropium bromide inhaler, lisinopril, melatonin, metformin, mylanta, naloxegol, insulin novofine, oxycodone, pantoprazole, pregabalin, senna, spironolactone, tresiba insulin

## 2023-07-10 ENCOUNTER — APPOINTMENT (OUTPATIENT)
Dept: ENDOCRINOLOGY | Facility: CLINIC | Age: 53
End: 2023-07-10

## 2023-07-12 ENCOUNTER — APPOINTMENT (OUTPATIENT)
Dept: PULMONOLOGY | Facility: CLINIC | Age: 53
End: 2023-07-12
Payer: MEDICARE

## 2023-07-12 VITALS
DIASTOLIC BLOOD PRESSURE: 82 MMHG | RESPIRATION RATE: 15 BRPM | HEART RATE: 60 BPM | WEIGHT: 293 LBS | HEIGHT: 68 IN | SYSTOLIC BLOOD PRESSURE: 152 MMHG | OXYGEN SATURATION: 98 % | BODY MASS INDEX: 44.41 KG/M2 | TEMPERATURE: 98 F

## 2023-07-12 DIAGNOSIS — Z87.891 PERSONAL HISTORY OF NICOTINE DEPENDENCE: ICD-10-CM

## 2023-07-12 DIAGNOSIS — Z12.2 ENCOUNTER FOR SCREENING FOR MALIGNANT NEOPLASM OF RESPIRATORY ORGANS: ICD-10-CM

## 2023-07-12 PROCEDURE — 99214 OFFICE O/P EST MOD 30 MIN: CPT | Mod: 25

## 2023-07-12 PROCEDURE — G0296 VISIT TO DETERM LDCT ELIG: CPT

## 2023-07-12 NOTE — CONSULT LETTER
[Dear  ___] : Dear  [unfilled], [Consult Letter:] : I had the pleasure of evaluating your patient, [unfilled]. [Please see my note below.] : Please see my note below. [Consult Closing:] : Thank you very much for allowing me to participate in the care of this patient.  If you have any questions, please do not hesitate to contact me. [FreeTextEntry3] : Yours truly,\par \par Davin Salas MD

## 2023-07-12 NOTE — ASSESSMENT
[FreeTextEntry1] : \par ___________\par PATIENT DATA\par ______________\par NAME.  MARIBEL KELLY                  \par AGE.   52 (3/8/2023) \par .  1970 \par SEX.   f \par CONTACT. [phone] \par PCP. DR BAILEE SINGH \par REFERRING MD.  DR BAILEE SINGH \par INITIAL VISIT DATE . 3/8/2023 \par HABITS.\par .. quit smoking \par .. 1 ppd \par .. started smoking age 9 \par .. 30 pyh \par ALLERGY. \par .. nkda \par HOSPITAL ADMISSIONS.\par .. -2023 [facility] VS\par .. -10/4/2022 [facility] VS \par .. 7/ NW \par HISTORY. \par .. 52 f initial visit 3/8/2023 for preop pulm eval for neurostimulator insertion at [facility] \par .. I had managed her as Pulm consultant during her 2022 admission at [facility] VS \par \par TRAVEL. \par .. No \par OCCUPN. \par .. worked for [employer] in past  and has also worked as  x 3y    \par PETS.  \par .. no  \par ANY FAM HO CANCER. \par .. breast cancer mothers side \par ANY FAMILY HO VTE. \par .. no \par ANY FAM HO ASTHMA. \par .. no\par BIRTHPLACE.   US                            \par ANY ASBESTOS EXPOSURE. no\par ANY TB EXPOSURE. no \par ANY PAST HO CANCER. no \par ANY PAST HO VTE. no \par ANY PAST HO ASTHMA. \par .. had asthma as child  \par ANY PAST HO STAS.  \par .. 3/8/2023 denies \par ANY PAST HO CAD.\par .. No \par ANY PAST HO CHF.\par .. hfpef\par \par WORKUP.\par CBC\par .. 2023 w 11 Hb 11 Plt 515 \par SMA7\par .. 2023 Na 140 Cr 1.2 \par V duplx \par .. 2022 V duplx (-) \par CXR \par .. 2022 CXR mid pvc \par PFTS\par .. 2023 roger (done [facility])\par .... Spirometry n \par .... No signi improved after bd \par .... Lung volume n\par .... Diffusion n \par SLEEP STUDY\par .. SLEEP APNEA TESTING\par .... 3/8/2023 Patient states that she had sleep study done in [place] and was told that she does not have sleep apena \par ECHO\par .. 2022 ef 60% pasp 44 n lv syst and wilks functn \par MEDS \par .. 3/8/2023 Noct BPAP .\par .. 3/8/2023 atrovent \par .. 3/8/2023 Baclofen 5.3 \par .. 3/8/2023 Bumetanide 2 \par .. 3/8/2023 spironolactone 25\par .. 3/8/2023 lisinopril 10\par .. 3/8/2023 Carvedilol 3125x2 \par .. 3/8/2023 duloxetine 30 \par \par ___________\par PROBLEM LIST.\par ___________\par Respiratory failure\par .. Is on noct BPAP which was started during her hosp stay at  hosp 7/10/2022\par .. 2022 bpap 12/.6 743/46/147\par .. 2023 Uses noct bpap \par .. RO VTE.\par .. 2022 D dimr 461 \par ..  ct ch No pe \par ..  v duplx (-) \par Pulmonary hypertension.\par .. 2022 echo pasp 44 n lvsf n lvdf \par .. 2023 Refer echo \par Former smoker\par .. smoked 45 y\par .. quit  \par Marijuana smoker \par .. 3/8/2023 3-4/d pain management \par Dysphagia.\par .. 2022 FEES recommended reg solids thin liqs \par ARDS.\par .. 2022 spc trial at [facility]\par .. 2022  mf pneum needed MICU prone position \par .. Intubated -2022 \par .. ct ch 2022 no pe ggo and patchy airspace opac atelectasis bl \par HFPEF.\par .. echo 2022 ef 60% pasp 44 n lvsf n lvdf \par .. 2023 refer echo\par Hytn.\par DM.\par Complex regional pain syndrome Tyoe 1 \par .. 2023 Neurostimulator placed [facility]\par Pseudotumor cerebri \par Bipolar. \par Clearance for face surgery planned for 2023 \par \par \par ___________\par ASSESSMENT/RECOMMENDATIONS.\par ___________\par _____\par GENERAL\par ______\par IMMUNIZATION.\par .. Advised to remain up to date with covid vaccination\par HOME OXYGEN.\par .. 2023 ra rest 98%\par .. 3/8/2023 ra rest 96%\par .. 3/8/2023 Patient has nocturnal bpap started at [facility] 2023 for hypercapnic resp failure\par .. 3/8/2023 advised to continue using that till formal sleep study results available \par OBESITY.\par .. 2023 363 lb\par .. 3/8/2023 384 lb\par .. 2023 bmi 55 \par .. 3/8/2023 bmi 58\par LUNG CANCER SCREENING. \par .. age 3/8/2023 52 \par .. quit smoking \par .. 1 ppd \par .. started smoking age 9 \par .. 30 pyh \par .. 3/8/2023 will need ldct after her current procedure gets done with \par .. 3/8/2023 dw pt \par .. 2023 Shared decision making done \par .. 2023 refer for ldct \par SMOKING.\par .. quit smoking \par .. 1 ppd \par .. started smoking age 9 \par .. 30 pyh \par .. 3/8/2023 no longer smoking \par .. 3/8/2023 stopped smoking  marijuana \par SLEEP APNEA.\par .. 3/8/2023 states she has been tested and was told is negative \par .. 3/8/2023 no recorsd available \par .. 2023 wants to hold off sleep study till next visit\par ________________\par LUNG CANCER SCREENING  \par ________________\par . A/R\par .. 2023 referred Pros and cons explained \par \par ________________\par PREOP PULMONARY EVALUATION \par ________________\par HISTORY\par .. 2023 Going for face surgery [facility] \par .. 3/8/2023 Going for nerostimulator procedure at [facility] scheduled for 3/21/2023  Procedure was done uneventfully\par .. 3/8/2023 BEDOLLA as noted above \par MMRC \par .. 2023 MMRC 1 \par ..  3/8/2023 MMRC 1  Can walk 3 flight stairs \par .. 3/8/2023 Uses bpap at time of sleep\par .. 3/8/2023 Not on home oxygen \par .. 3/8/2023 has not required to use inhaler in months but gets atrovent inhaler every morning at [facility]\par .. 3/8/2023 uses rollater to walk because of the pain\par ___________\par ASSESSMENT\par \par \par .. ARISCAT SCORING 3/8/2023 \par …... AGE 52 score 3 \par …... PREOP PULSE PX score 1\par ...... SURGERY DURATION score 1\par \par .. 3/8/2023 ARISCAT score is 5 which falls in the low risk indicating 1.6% risk of pulm complications\par \par \par \par \par FUNCTIONAL STATUS\par .. 2023 no limitation in activity \par .. 3/8/2023 Can climb up 3 fligh stairs \par .. 3/8/2023 This is over 4 mets \par \par SLEEP APNEA\par .. 3/8/2023 Not present as per patient \par \par RCRI \par ..3/8/2023 Score 2 \par …..... Insulin\par …..... HO CHF \par \par SMOKING\par .. 2023 Nonsmoker \par .. 3/8/2023 Nonsmoker\par .. 3/8/2023 Former smoker  \par …..... quit \par ........ 34 pyh \par \par OVERALL\par .. 2023 Patient remains  at above average risk of periop pulmonary complications but is in optimal pulm status and is cleared accordingly She recently went through a 4 hr surgery uneventfully \par .. 3/8/2023 Patient is at above average risk of periop pulmonary complications but is in optimal pulm status and is cleared accordingly\par \par RISK MITIGATION.\par ..2023 Use periop bronchodilators janes if wheeze\par .. 2023 Use noct BPAP which she is accustomed to \par .. 2023 Cardiac monitoring and capnography in immediate postop period\par \par TIME SPENT\par .. A total of 32 minutes were spent during this patient visit with various face to face as well as non face to face activities such as data mining medical decision making placing orders explaining plan risks benefits alternatives etc \par \par

## 2023-07-12 NOTE — REASON FOR VISIT
[Follow-Up] : a follow-up visit [Pre-op Risk Stratification] : pre-op risk stratification [TextBox_44] : lung cancer screening  [TextBox_13] : Dr. BARB SINGH

## 2023-07-20 ENCOUNTER — EMERGENCY (EMERGENCY)
Facility: HOSPITAL | Age: 53
LOS: 1 days | Discharge: HOME | End: 2023-07-20
Attending: EMERGENCY MEDICINE
Payer: MEDICARE

## 2023-07-20 VITALS
OXYGEN SATURATION: 97 % | RESPIRATION RATE: 20 BRPM | HEART RATE: 76 BPM | DIASTOLIC BLOOD PRESSURE: 78 MMHG | HEIGHT: 67 IN | TEMPERATURE: 98 F | WEIGHT: 293 LBS | SYSTOLIC BLOOD PRESSURE: 122 MMHG

## 2023-07-20 DIAGNOSIS — Z98.890 OTHER SPECIFIED POSTPROCEDURAL STATES: Chronic | ICD-10-CM

## 2023-07-20 DIAGNOSIS — Z90.49 ACQUIRED ABSENCE OF OTHER SPECIFIED PARTS OF DIGESTIVE TRACT: Chronic | ICD-10-CM

## 2023-07-20 LAB
ALBUMIN SERPL ELPH-MCNC: 4 G/DL — SIGNIFICANT CHANGE UP (ref 3.3–5)
ALP SERPL-CCNC: 156 U/L — HIGH (ref 40–120)
ALT FLD-CCNC: 27 U/L — SIGNIFICANT CHANGE UP (ref 10–45)
ANION GAP SERPL CALC-SCNC: 14 MMOL/L — SIGNIFICANT CHANGE UP (ref 5–17)
AST SERPL-CCNC: 18 U/L — SIGNIFICANT CHANGE UP (ref 10–40)
BASOPHILS # BLD AUTO: 0.02 K/UL — SIGNIFICANT CHANGE UP (ref 0–0.2)
BASOPHILS NFR BLD AUTO: 0.2 % — SIGNIFICANT CHANGE UP (ref 0–2)
BILIRUB SERPL-MCNC: 0.2 MG/DL — SIGNIFICANT CHANGE UP (ref 0.2–1.2)
BUN SERPL-MCNC: 31 MG/DL — HIGH (ref 7–23)
CALCIUM SERPL-MCNC: 9.2 MG/DL — SIGNIFICANT CHANGE UP (ref 8.4–10.5)
CHLORIDE SERPL-SCNC: 102 MMOL/L — SIGNIFICANT CHANGE UP (ref 96–108)
CO2 SERPL-SCNC: 22 MMOL/L — SIGNIFICANT CHANGE UP (ref 22–31)
CREAT SERPL-MCNC: 1.28 MG/DL — SIGNIFICANT CHANGE UP (ref 0.5–1.3)
EGFR: 50 ML/MIN/1.73M2 — LOW
EOSINOPHIL # BLD AUTO: 0.13 K/UL — SIGNIFICANT CHANGE UP (ref 0–0.5)
EOSINOPHIL NFR BLD AUTO: 1.4 % — SIGNIFICANT CHANGE UP (ref 0–6)
GLUCOSE SERPL-MCNC: 186 MG/DL — HIGH (ref 70–99)
HCG SERPL-ACNC: <2 MIU/ML — SIGNIFICANT CHANGE UP
HCT VFR BLD CALC: 40.1 % — SIGNIFICANT CHANGE UP (ref 34.5–45)
HGB BLD-MCNC: 12.6 G/DL — SIGNIFICANT CHANGE UP (ref 11.5–15.5)
IMM GRANULOCYTES NFR BLD AUTO: 0.2 % — SIGNIFICANT CHANGE UP (ref 0–0.9)
LYMPHOCYTES # BLD AUTO: 3.36 K/UL — HIGH (ref 1–3.3)
LYMPHOCYTES # BLD AUTO: 36.9 % — SIGNIFICANT CHANGE UP (ref 13–44)
MAGNESIUM SERPL-MCNC: 1.9 MG/DL — SIGNIFICANT CHANGE UP (ref 1.6–2.6)
MCHC RBC-ENTMCNC: 28.8 PG — SIGNIFICANT CHANGE UP (ref 27–34)
MCHC RBC-ENTMCNC: 31.4 GM/DL — LOW (ref 32–36)
MCV RBC AUTO: 91.6 FL — SIGNIFICANT CHANGE UP (ref 80–100)
MONOCYTES # BLD AUTO: 0.56 K/UL — SIGNIFICANT CHANGE UP (ref 0–0.9)
MONOCYTES NFR BLD AUTO: 6.2 % — SIGNIFICANT CHANGE UP (ref 2–14)
NEUTROPHILS # BLD AUTO: 5.01 K/UL — SIGNIFICANT CHANGE UP (ref 1.8–7.4)
NEUTROPHILS NFR BLD AUTO: 55.1 % — SIGNIFICANT CHANGE UP (ref 43–77)
NRBC # BLD: 0 /100 WBCS — SIGNIFICANT CHANGE UP (ref 0–0)
PLATELET # BLD AUTO: 447 K/UL — HIGH (ref 150–400)
POTASSIUM SERPL-MCNC: 4.4 MMOL/L — SIGNIFICANT CHANGE UP (ref 3.5–5.3)
POTASSIUM SERPL-SCNC: 4.4 MMOL/L — SIGNIFICANT CHANGE UP (ref 3.5–5.3)
PROT SERPL-MCNC: 7.7 G/DL — SIGNIFICANT CHANGE UP (ref 6–8.3)
RBC # BLD: 4.38 M/UL — SIGNIFICANT CHANGE UP (ref 3.8–5.2)
RBC # FLD: 13.2 % — SIGNIFICANT CHANGE UP (ref 10.3–14.5)
SODIUM SERPL-SCNC: 138 MMOL/L — SIGNIFICANT CHANGE UP (ref 135–145)
WBC # BLD: 9.1 K/UL — SIGNIFICANT CHANGE UP (ref 3.8–10.5)
WBC # FLD AUTO: 9.1 K/UL — SIGNIFICANT CHANGE UP (ref 3.8–10.5)

## 2023-07-20 PROCEDURE — 84702 CHORIONIC GONADOTROPIN TEST: CPT

## 2023-07-20 PROCEDURE — 99285 EMERGENCY DEPT VISIT HI MDM: CPT | Mod: GC

## 2023-07-20 PROCEDURE — 70486 CT MAXILLOFACIAL W/O DYE: CPT | Mod: 26,MA

## 2023-07-20 PROCEDURE — 99284 EMERGENCY DEPT VISIT MOD MDM: CPT | Mod: 25

## 2023-07-20 PROCEDURE — 85025 COMPLETE CBC W/AUTO DIFF WBC: CPT

## 2023-07-20 PROCEDURE — 96375 TX/PRO/DX INJ NEW DRUG ADDON: CPT

## 2023-07-20 PROCEDURE — 70450 CT HEAD/BRAIN W/O DYE: CPT | Mod: MA

## 2023-07-20 PROCEDURE — 96374 THER/PROPH/DIAG INJ IV PUSH: CPT

## 2023-07-20 PROCEDURE — 80053 COMPREHEN METABOLIC PANEL: CPT

## 2023-07-20 PROCEDURE — 76377 3D RENDER W/INTRP POSTPROCES: CPT

## 2023-07-20 PROCEDURE — 70486 CT MAXILLOFACIAL W/O DYE: CPT | Mod: MA

## 2023-07-20 PROCEDURE — 70450 CT HEAD/BRAIN W/O DYE: CPT | Mod: 26,MA

## 2023-07-20 PROCEDURE — 83735 ASSAY OF MAGNESIUM: CPT

## 2023-07-20 PROCEDURE — 76377 3D RENDER W/INTRP POSTPROCES: CPT | Mod: 26

## 2023-07-20 RX ORDER — SODIUM CHLORIDE 9 MG/ML
1000 INJECTION INTRAMUSCULAR; INTRAVENOUS; SUBCUTANEOUS ONCE
Refills: 0 | Status: COMPLETED | OUTPATIENT
Start: 2023-07-20 | End: 2023-07-20

## 2023-07-20 RX ORDER — METOCLOPRAMIDE HCL 10 MG
10 TABLET ORAL ONCE
Refills: 0 | Status: COMPLETED | OUTPATIENT
Start: 2023-07-20 | End: 2023-07-20

## 2023-07-20 RX ORDER — ACETAMINOPHEN 500 MG
1000 TABLET ORAL ONCE
Refills: 0 | Status: COMPLETED | OUTPATIENT
Start: 2023-07-20 | End: 2023-07-20

## 2023-07-20 RX ADMIN — SODIUM CHLORIDE 1000 MILLILITER(S): 9 INJECTION INTRAMUSCULAR; INTRAVENOUS; SUBCUTANEOUS at 17:52

## 2023-07-20 RX ADMIN — Medication 10 MILLIGRAM(S): at 17:52

## 2023-07-20 RX ADMIN — Medication 400 MILLIGRAM(S): at 17:53

## 2023-07-20 RX ADMIN — Medication 1000 MILLIGRAM(S): at 21:00

## 2023-07-20 NOTE — CONSULT NOTE ADULT - ASSESSMENT
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 Reports that the pain has progressively worsened since diagnosis. Reports the pain is R sided over her forehead and cheek, described as stabbing/shooting, worse with light and touch, and is associated with floaters and flashes. Previously on 15 mg oxycodone for her back pain, increased to 35 mg/day due to headaches which has not relieved her pain. She reports that this pain does not feel like her psuedotumor cerebri. She is not on AC. Denies any weakness, chest pain, or lightheadedness. Neurology consulted for for HA and abnormal gait. During encounter patient does not complain of HA. Patient reports ever since the mass in her right cheek has started to become larger she has had discomfort on the right side of her head, she gestures to the right TMJ region. She also has noted flashes of light shooting up from below the eye(mass is located below right eye).  She reports numbness over the right lower face (CNV2 & 3 divisions). All of these symptoms have become worse over the course of July. She also reports lightheadedness upon sitting ot standing up. This causes her to feel unsteady momentarily. Once she lets it pass she can walk. She walks with a walker at baseline. She says she is due for a right knee placement and is wearing an Ace bandage. Exam unremarkable behinds decrease in sensation in the right side of lower face. Patient able to walk. Ct head unremarkable.    Impression: headache, visual sensations, numbness of right lower face in relation to right facial mass likely causing compression of effected areas. Unsteady gait seems related to orthostatic hypotension and seems unrelated to facial symptoms    Plan  Continue to follow up with Surgeon for MRI and biopsy/removal of tumor  Patient can follow up with Neurology clinic at [address] [place] [address] [phone] for IIH/Headache management  No further neurological work-up inpatient     Case to be discussed with Neurology Attending

## 2023-07-20 NOTE — ED PROVIDER NOTE - NSICDXPASTSURGICALHX_GEN_ALL_CORE_FT
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

## 2023-07-20 NOTE — ED ADULT NURSE REASSESSMENT NOTE - NS ED NURSE REASSESS COMMENT FT1
Report received from TERESA REDDY. Pt received A&Ox4, vitals retaken and documented. Bed locked and in lowest position, side rails raised. Currently pending CTr.

## 2023-07-20 NOTE — ED ADULT NURSE NOTE - OBJECTIVE STATEMENT
52 yo F pt PMHx of spinal stenosis with spinal stimulator, CHF, R sided facial Lipoma p/w worsening HA over the past few days. pt endorses having HA for the past 2 months and takes Oxycodone 15mg for it PRN. pt endorses taking 20 mg this morning with minimal relief. pt ambulates with walker at baseline endorsing increased weakness.   pt is A&Ox4, BECERRA, EOEMI, PERRL, no unilateral weakness, sensation grossly intact, no facial droop, +2 pedal edema noted, breathing comfortably.  Pt denies headache, dizziness, chest pain, palpitations, cough, SOB, abdominal pain, n/v/d, urinary symptoms, fevers, chills, weakness at this time.

## 2023-07-20 NOTE — ED PROVIDER NOTE - CLINICAL SUMMARY MEDICAL DECISION MAKING FREE TEXT BOX
53-year-old female with past medical history of IIH, spinal stimulator for chronic back pain presents for right cheek mass concerning for liposarcoma, headaches, floaters, difficulty walking (balance issues), and flashes of light on right visual field.  Vital signs remarkable for blood pressure of 130/73.  Physical exam remarkable for decree sensation right face (V1–V3 distributions).  Plan for CT scan, labs, pain control, neuro consult, and possible plastics consult. 53-year-old female with past medical history of IIH, spinal stimulator for chronic back pain presents for right cheek mass concerning for liposarcoma, headaches, floaters, difficulty walking (balance issues), and flashes of light on right visual field.  Vital signs remarkable for blood pressure of 130/73.  Physical exam remarkable for decree sensation right face (V1–V3 distributions).  Plan for CT scan, labs, pain control, neuro consult, and possible plastics consult.    GLADYS Vang MD: Agree with resident/ACP MDM, assessment and plan as above.

## 2023-07-20 NOTE — ED PROVIDER NOTE - PHYSICAL EXAMINATION
LOS:     VITALS:   T(C): 36.5 (07-20-23 @ 16:06), Max: 36.5 (07-20-23 @ 16:06)  HR: 76 (07-20-23 @ 16:06) (76 - 76)  BP: 122/78 (07-20-23 @ 16:06) (122/78 - 122/78)  RR: 20 (07-20-23 @ 16:06) (20 - 20)  SpO2: 97% (07-20-23 @ 16:06) (97% - 97%)    GENERAL: NAD  HEAD:  R buccal swelling and tenderness, hard to palpation.   EYES: Pain with EOM of R eye in all four planes, PERRLA, conjunctiva and sclera clear  ENT: Moist mucous membranes  CHEST/LUNG: Clear to auscultation bilateraly  HEART: Regular rate and rhythm; No murmurs, rubs, or gallops  EXTREMITIES: No clubbing, cyanosis, or edema  SKIN: No rashes or lesions    NERVOUS SYSTEM:  A&Ox3, visual testing reveals mild difficulty seeing in all four quadrants from R eye, pain with extraocular movements of R eye, no nystagmus. Pupils are equal, round, and reactive to light; extraocular movements are intact without nystagmus; facial sensation is intact in the V1-V3 distribution bilaterally, pain reproducible with light touch over the R forehead and cheek; face is symmetric with normal eye closure and smile; hearing is intact to conversation; uvula is midline and soft palate rises symmetrically; head turning intact; tongue protrudes in the midline.    No pronator drift, 5/5  strength, sensation intact to L touch in b/l upper/lower extremities. Finger-nose-finger testing intact without dysmetria.

## 2023-07-20 NOTE — ED PROVIDER NOTE - CARE PLAN
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 On physical exam her vitals were WNL, neuro exam WNL, visible swelling over her R cheek, hard on palpation, pain reproduced with palpation. Workup with CBC, CMP, CT head pending, pain control with IV Ofirmev. 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 Less likely intracranial neoplasm, SAH, primary headache syndromes, pseudotumor cerebri.   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 On physical exam her vitals were WNL, neuro exam WNL, visible swelling over her R cheek, hard on palpation, pain reproduced with palpation. Workup with CBC, CMP, CT head pending, pain control with IV Ofirmev. 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 Less likely intracranial neoplasm, SAH, primary headache syndromes, pseudotumor cerebri.

## 2023-07-20 NOTE — ED PROVIDER NOTE - PATIENT PORTAL LINK FT
You can access the FollowMyHealth Patient Portal offered by [facility] by registering at the following website: [email or web address]. By joining [employer]’s FollowMyHealth portal, you will also be able to view your health information using other applications (apps) compatible with our system.

## 2023-07-20 NOTE — ED PROVIDER NOTE - NS ED ROS FT
CONSTITUTIONAL: Endorses weight loss. No weakness, fevers or chills  EYES/ENT:  Blurry vision from R eye, flashes and floaters;  No vertigo or throat pain   NECK:  No pain or stiffness  RESPIRATORY:  No cough, wheezing, or shortness of breath  CARDIOVASCULAR:  No chest pain or palpitations  GASTROINTESTINAL:  Endorses diarrhea. No abdominal or epigastric pain. No nausea, vomiting, or constipation  GENITOURINARY:  No dysuria, frequency or hematuria  MUSCULOSKELETAL: Normal strength  NEUROLOGICAL: Numbness at R forearm  SKIN:  No itching, rashes

## 2023-07-20 NOTE — ED PROVIDER NOTE - NSFOLLOWUPCLINICS_GEN_ALL_ED_FT
[facility]  Neurology  [address] [place] [address] [address] [place] [address]  Phone: [phone]  Fax:

## 2023-07-20 NOTE — ED PROVIDER NOTE - OBJECTIVE STATEMENT
53 y F PMH CHF, HTN, HLD, DM, psuedotumor cerebri, chronic back pain (intercostal neuralgia/complex regional pain syndrome) treated with spinal stimulator, presenting with progressively worsening R sided facial pain and headache associated with floaters and flashes since June 29th. 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 Reports that the pain has progressively worsened since diagnosis. Reports the pain is R sided over her forehead and cheek, described as stabbing/shooting, worse with light and touch, and is associated with floaters and flashes. Previously on 15 mg oxycodone for her back pain, increased to 35 mg/day due to headaches which has not relieved her pain. She reports that this pain does not feel like her psuedotumor cerebri. She is not on AC. Denies any weakness, chest pain, or lightheadedness.

## 2023-07-20 NOTE — ED PROVIDER NOTE - PROGRESS NOTE DETAILS
Siddharth PGY2: Patient signed out to me by day team.  Patient sleeping, but reports 12/10 pain on the overnight.  Patient presented to ED for worsening right cheek mass pain, lightning flashes and right eye, worsening headaches, and difficulty walking due to balance.  Patient has had the symptoms since early July, but has worsened over the past few days.  She presented dermatologist on 6/29/2023 for the right cheek mass where ultrasound was concerning for liposarcoma.  Patient was referred to plastic surgeon, but he was on vacation at the time and patient met with NP.  NP asked patient to get an MRI for further evaluation, but patient did not want to wait as symptoms were worsening.  She denies any fevers, but does report weight loss (25 pounds in the past 3 weeks) and chills.  On physical exam, patient has decreased sensation on right face (V1–V3).    CT scan shows right maxillary polyps or retention cysts with nasal septal deviation to the right side (no signs of septal hematoma).  Plan for neurology consult for patient be cleared for outpatient work-up as plastics will most likely state that patient needs to get an MRI.  We will request that patient be scheduled for urgent outpatient plastics appointment. Siddharth PGY2: Patient signed out to me by day team.  Patient sleeping, but reports 12/10 pain on the overnight.  Patient presented to ED for worsening right cheek mass pain, lightning flashes and right eye, worsening headaches, and difficulty walking due to balance.  Patient has had the symptoms since early July, but has worsened over the past few days.  She presented dermatologist on 6/29/2023 for the right cheek mass where ultrasound was concerning for liposarcoma.  Patient was referred to plastic surgeon, but he was on vacation at the time and patient met with NP.  NP asked patient to get an MRI for further evaluation, but patient did not want to wait as symptoms were worsening.  She denies any fevers, but does report weight loss (25 pounds in the past 3 weeks) and chills.  On physical exam, patient has decreased sensation on right face (V1–V3).    CT scan shows right maxillary polyps or retention cysts with nasal septal deviation to the right side (no signs of septal hematoma).  Plan for neurology consult for patient be cleared for outpatient work-up as plastics will most likely state that patient needs to get an MRI.  We will request that patient be scheduled for urgent outpatient plastics appointment.    Neurology consulted at 20:15. Siddharth PGY2: Neurology cleared patient to be discharged for follow-up with plastic surgery and MRI for biopsy/removal of tumor.  Patient can follow-up with neurology clinic at 611 with a boarmandod great neck for IIH/headache management.  Patient agreeable to plan.

## 2023-07-20 NOTE — ED PROVIDER NOTE - PLAN OF CARE
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 On physical exam her vitals were WNL, neuro exam WNL, visible swelling over her R cheek, hard on palpation, pain reproduced with palpation. Workup with CBC, CMP, CT head pending, pain control with IV Ofirmev. 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 Less likely intracranial neoplasm, SAH, primary headache syndromes, pseudotumor cerebri.

## 2023-07-20 NOTE — CONSULT NOTE ADULT - SUBJECTIVE AND OBJECTIVE BOX
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 Reports that the pain has progressively worsened since diagnosis. Reports the pain is R sided over her forehead and cheek, described as stabbing/shooting, worse with light and touch, and is associated with floaters and flashes. Previously on 15 mg oxycodone for her back pain, increased to 35 mg/day due to headaches which has not relieved her pain. She reports that this pain does not feel like her psuedotumor cerebri. She is not on AC. Denies any weakness, chest pain, or lightheadedness. Neurology consulted for for HA and abnormal gait. During encounter patient does not complain of HA. Patient reports ever since the mass in her right cheek has started to become larger she has had discomfort on the right side of her head, she gestures to the right TMJ region. She also has noted flashes of light shooting up from below the eye(mass is located below right eye).  She reports numbness over the right lower face (CNV2 & 3 divisions). All of these symptoms have become worse over the course of July. She also reports lightheadedness upon sitting ot standing up. This causes her to feel unsteady momentarily. Once she lets it pass she can walk. She walks with a walker at baseline. She says she is due for a right knee placement and is wearing an Ace bandage.     Review of Systems: denies weakness, loss of vision, N/V    Allergies: amitriptyline (Other)    PMHx/PSHx/Family Hx: As above, otherwise see below   Diabetes  HTN (hypertension)  Neuropathy  Obesity  Former cigarette smoker  Marijuana smoker, continuous  Neuralgia  Cardiac abnormality  Diabetes  Essential hypertension  IBS (irritable bowel syndrome)  Complex regional pain syndrome type 1    Social Hx:  No current use of tobacco, alcohol, or illicit drugs    Medications:  MEDICATIONS  (STANDING):  MEDICATIONS  (PRN):    Vitals:  T(C): 36.5 (07-20-23 @ 16:06), Max: 36.5 (07-20-23 @ 16:06)  HR: 83 (07-20-23 @ 19:51) (76 - 83)  BP: 118/63 (07-20-23 @ 19:51) (118/63 - 122/78)  RR: 18 (07-20-23 @ 19:51) (18 - 20)  SpO2: 99% (07-20-23 @ 19:51) (97% - 99%)    Physical Examination:   General - NAD    Neurologic Exam:  Mental status - Awake, Alert, Oriented to person, place, and time. Speech fluent, repetition and naming intact. Follows simple commands.    Cranial nerves - PERRLA, VFF, EOMI, decreased sensation of Right CNV division 2&3 (located over mass)   facial strength intact without asymmetry b/l, hearing intact b/l, palate with symmetric elevation, trapezius  5/5 strength b/l, tongue midline on protrusion with full lateral movement    Motor - Normal bulk and tone throughout. No pronator drift.  Strength testing            Deltoid      Biceps      Triceps           R            5                 5               5                     5                      L             5                 5               5                     5                                           Hip Flexion        Knee Flexion    Knee Extension    Dorsiflexion    Plantar Flexion  R              5                           5                       5                           5                            5                          L              5                           5                        5                           5                            5                              Sensation - Light touch intact throughout    DTR's -             Biceps      Triceps     Brachioradialis      Patellar    Ankle    Toes/plantar response  R             2+             2+                  2+                       2+            2+                 Down  L              2+             2+                 2+                        2+           2+                 Down    Coordination - Finger to Nose intact b/l. No tremors appreciated    Gait and station - slow narrow step gait but able to walk confidently. Romberg (-)    Labs:                        12.6   9.10  )-----------( 447      ( 20 Jul 2023 18:24 )             40.1     07-20    138  |  102  |  31<H>  ----------------------------<  186<H>  4.4   |  22  |  1.28    Ca    9.2      20 Jul 2023 18:24  Mg     1.9     07-20    TPro  7.7  /  Alb  4.0  /  TBili  0.2  /  DBili  x   /  AST  18  /  ALT  27  /  AlkPhos  156<H>  07-20    CAPILLARY BLOOD GLUCOSE    LIVER FUNCTIONS - ( 20 Jul 2023 18:24 )  Alb: 4.0 g/dL / Pro: 7.7 g/dL / ALK PHOS: 156 U/L / ALT: 27 U/L / AST: 18 U/L / GGT: x           Radiology:  CT Head No Cont:  (20 Jul 2023 18:54)   CTH: No intracranial mass effect or hemorrhage.

## 2023-07-20 NOTE — ED PROVIDER NOTE - NSFOLLOWUPINSTRUCTIONS_ED_ALL_ED_FT
You were seen for worsening neurological symptoms and evaluation for right facial mass.  Your evaluated with physical exam, labs, and CT scan.  Your CT scan only showed a right maxillary polyp or retention cyst with nasal septal deviation to the right side and no septal hematoma.  These results and other blood tests were nonconcerning for abnormalities requiring urgent intervention.  Verbal instructions provided, including instructions to return to ED immediately for any new, worsening, or concerning symptoms. Patient and/or family/caregiver endorsed understanding.    Please follow up with your primary care provider, neurology, and a plastic surgeon regarding this ED visit.    Neuro Clinic: [address] [facility] in [place], [place]    Thank you for choosing us for your care.

## 2023-07-21 VITALS
DIASTOLIC BLOOD PRESSURE: 73 MMHG | RESPIRATION RATE: 19 BRPM | HEART RATE: 60 BPM | SYSTOLIC BLOOD PRESSURE: 130 MMHG | OXYGEN SATURATION: 100 % | TEMPERATURE: 98 F

## 2023-07-31 ENCOUNTER — APPOINTMENT (OUTPATIENT)
Dept: NEUROLOGY | Facility: CLINIC | Age: 53
End: 2023-07-31

## 2023-08-01 ENCOUNTER — APPOINTMENT (OUTPATIENT)
Dept: MRI IMAGING | Facility: CLINIC | Age: 53
End: 2023-08-01

## 2023-08-11 ENCOUNTER — APPOINTMENT (OUTPATIENT)
Dept: MRI IMAGING | Facility: CLINIC | Age: 53
End: 2023-08-11
Payer: MEDICARE

## 2023-08-11 PROCEDURE — 70551 MRI BRAIN STEM W/O DYE: CPT

## 2023-08-14 ENCOUNTER — APPOINTMENT (OUTPATIENT)
Dept: ULTRASOUND IMAGING | Facility: CLINIC | Age: 53
End: 2023-08-14
Payer: MEDICARE

## 2023-08-14 ENCOUNTER — APPOINTMENT (OUTPATIENT)
Dept: MRI IMAGING | Facility: CLINIC | Age: 53
End: 2023-08-14
Payer: MEDICARE

## 2023-08-14 PROCEDURE — 70543 MRI ORBT/FAC/NCK W/O &W/DYE: CPT

## 2023-08-14 PROCEDURE — A9585: CPT

## 2023-08-14 PROCEDURE — 76536 US EXAM OF HEAD AND NECK: CPT

## 2023-08-16 ENCOUNTER — NON-APPOINTMENT (OUTPATIENT)
Age: 53
End: 2023-08-16

## 2023-08-26 NOTE — DISCHARGE NOTE NURSING/CASE MANAGEMENT/SOCIAL WORK - NSDPDISTO_GEN_ALL_CORE
Home acetaminophen 325 mg oral tablet: 2 tab(s) orally every 6 hours As needed Temp greater or equal to 38C (100.4F), Mild Pain (1 - 3)  amLODIPine 5 mg oral tablet: 1 tab(s) orally once a day  aspirin 81 mg oral tablet, chewable: 1 tab(s) orally once a day  Cozaar 50 mg oral tablet: 1 tab(s) orally once a day  DULoxetine 30 mg oral delayed release capsule: 1 tab(s) orally once a day  Florastor 250 mg oral capsule: 1 cap(s) orally 2 times a day  gabapentin 600 mg oral tablet: 1 tab(s) orally 3 times a day  levothyroxine 100 mcg (0.1 mg) oral tablet: 1 tab(s) orally once a day  metoprolol succinate 50 mg oral tablet, extended release: 1 tab(s) orally once a day  nystatin 100,000 units/g topical powder: Apply topically to affected area 2 times a day 1 Apply topically to affected area 2 times a day  sertraline 100 mg oral tablet: 1 tab(s) orally once a day  simvastatin 40 mg oral tablet: 1 tab(s) orally once a day (at bedtime)

## 2023-08-29 ENCOUNTER — APPOINTMENT (OUTPATIENT)
Dept: PLASTIC SURGERY | Facility: CLINIC | Age: 53
End: 2023-08-29
Payer: MEDICARE

## 2023-08-29 VITALS — HEIGHT: 68 IN | BODY MASS INDEX: 44.41 KG/M2 | WEIGHT: 293 LBS

## 2023-08-29 PROCEDURE — 99213 OFFICE O/P EST LOW 20 MIN: CPT

## 2023-08-29 NOTE — HISTORY OF PRESENT ILLNESS
[FreeTextEntry1] : right cheek mass, US and MRI done. Mass x a couple of months. It is growing and tender.  US inconclusive MRI shows mild asymmetric swelling of the superficial soft tissue in the right malar region without mass, fluid collection, edema or enhancement.

## 2023-08-29 NOTE — ASSESSMENT
[FreeTextEntry1] : Pt was seen and examined together by MARKO Toth and Dr. Frank Ba. Assessment and plan formulated and discussed at time of visit.

## 2023-10-11 ENCOUNTER — NON-APPOINTMENT (OUTPATIENT)
Age: 53
End: 2023-10-11

## 2023-10-11 ENCOUNTER — APPOINTMENT (OUTPATIENT)
Dept: CARDIOLOGY | Facility: CLINIC | Age: 53
End: 2023-10-11
Payer: MEDICARE

## 2023-10-11 VITALS
OXYGEN SATURATION: 97 % | WEIGHT: 293 LBS | DIASTOLIC BLOOD PRESSURE: 71 MMHG | SYSTOLIC BLOOD PRESSURE: 113 MMHG | BODY MASS INDEX: 44.41 KG/M2 | HEART RATE: 64 BPM | TEMPERATURE: 98.6 F | HEIGHT: 68 IN

## 2023-10-11 DIAGNOSIS — Z01.810 ENCOUNTER FOR PREPROCEDURAL CARDIOVASCULAR EXAMINATION: ICD-10-CM

## 2023-10-11 DIAGNOSIS — R06.09 OTHER FORMS OF DYSPNEA: ICD-10-CM

## 2023-10-11 DIAGNOSIS — I10 ESSENTIAL (PRIMARY) HYPERTENSION: ICD-10-CM

## 2023-10-11 PROCEDURE — 93000 ELECTROCARDIOGRAM COMPLETE: CPT

## 2023-10-11 PROCEDURE — 99214 OFFICE O/P EST MOD 30 MIN: CPT

## 2023-10-12 PROBLEM — R06.09 DYSPNEA ON MINIMAL EXERTION: Status: RESOLVED | Noted: 2021-07-29 | Resolved: 2023-10-12

## 2023-10-12 PROBLEM — I10 CHRONIC HYPERTENSION: Status: ACTIVE | Noted: 2021-06-16

## 2023-10-12 PROBLEM — Z01.810 PREOPERATIVE CARDIOVASCULAR EXAMINATION: Status: ACTIVE | Noted: 2023-10-12

## 2023-10-13 ENCOUNTER — APPOINTMENT (OUTPATIENT)
Dept: CARDIOLOGY | Facility: CLINIC | Age: 53
End: 2023-10-13

## 2023-10-18 ENCOUNTER — APPOINTMENT (OUTPATIENT)
Dept: PULMONOLOGY | Facility: CLINIC | Age: 53
End: 2023-10-18
Payer: MEDICARE

## 2023-10-18 VITALS
WEIGHT: 293 LBS | SYSTOLIC BLOOD PRESSURE: 129 MMHG | OXYGEN SATURATION: 99 % | DIASTOLIC BLOOD PRESSURE: 73 MMHG | HEART RATE: 62 BPM | BODY MASS INDEX: 54.43 KG/M2

## 2023-10-18 DIAGNOSIS — I27.20 PULMONARY HYPERTENSION, UNSPECIFIED: ICD-10-CM

## 2023-10-18 DIAGNOSIS — E66.2 MORBID (SEVERE) OBESITY WITH ALVEOLAR HYPOVENTILATION: ICD-10-CM

## 2023-10-18 PROCEDURE — 94727 GAS DIL/WSHOT DETER LNG VOL: CPT

## 2023-10-18 PROCEDURE — 36415 COLL VENOUS BLD VENIPUNCTURE: CPT

## 2023-10-18 PROCEDURE — 94060 EVALUATION OF WHEEZING: CPT

## 2023-10-18 PROCEDURE — 94729 DIFFUSING CAPACITY: CPT

## 2023-10-18 PROCEDURE — 99214 OFFICE O/P EST MOD 30 MIN: CPT | Mod: 25

## 2023-10-19 ENCOUNTER — NON-APPOINTMENT (OUTPATIENT)
Age: 53
End: 2023-10-19

## 2023-10-19 LAB
ALBUMIN SERPL ELPH-MCNC: 4.1 G/DL
ALP BLD-CCNC: 160 U/L
ALT SERPL-CCNC: 23 U/L
ANION GAP SERPL CALC-SCNC: 13 MMOL/L
AST SERPL-CCNC: 17 U/L
BILIRUB SERPL-MCNC: 0.3 MG/DL
BUN SERPL-MCNC: 21 MG/DL
CALCIUM SERPL-MCNC: 9.2 MG/DL
CHLORIDE SERPL-SCNC: 100 MMOL/L
CO2 SERPL-SCNC: 21 MMOL/L
CREAT SERPL-MCNC: 1.06 MG/DL
EGFR: 63 ML/MIN/1.73M2
GLUCOSE SERPL-MCNC: 250 MG/DL
HCT VFR BLD CALC: 39.2 %
HGB BLD-MCNC: 11.8 G/DL
MCHC RBC-ENTMCNC: 28.6 PG
MCHC RBC-ENTMCNC: 30.1 GM/DL
MCV RBC AUTO: 94.9 FL
NT-PROBNP SERPL-MCNC: 45 PG/ML
PLATELET # BLD AUTO: 481 K/UL
POTASSIUM SERPL-SCNC: 4.2 MMOL/L
PROT SERPL-MCNC: 7.9 G/DL
RBC # BLD: 4.13 M/UL
RBC # FLD: 13.2 %
SODIUM SERPL-SCNC: 134 MMOL/L
TSH SERPL-ACNC: 0.54 UIU/ML
WBC # FLD AUTO: 10.74 K/UL

## 2023-10-22 LAB
EOSINOPHIL # BLD MANUAL: 150 /UL
TOTAL IGE SMQN RAST: 55 KU/L

## 2023-11-21 ENCOUNTER — APPOINTMENT (OUTPATIENT)
Dept: PULMONOLOGY | Facility: CLINIC | Age: 53
End: 2023-11-21

## 2023-11-22 NOTE — PATIENT PROFILE ADULT - CENTRAL VENOUS CATHETER/PICC LINE
No protocol for requested medication     Medication: Metoprolol, Lisinopril, Gabapentin  Last office visit date: 09/27/23   Next visit: not scheduled - remains inpatient at [facility]  Pharmacy[place] [id number] [place] [address]    Order pended, routed to clinician for review.     Protocol for Metoprolol and Lisinopril will not trigger - routed to PCP for this reason.       no

## 2023-12-01 NOTE — PHYSICAL THERAPY INITIAL EVALUATION ADULT - IMPAIRED TRANSFERS: SIT/STAND, REHAB EVAL
It was a pleasure to see Janet today!  -Her growth and development is great!  -Her exam is good  -We will start birth control. You can start this the Sunday after your next period. If bad side effects, please call. If mild side effects, you can monitor for 3 months. Call in 3 months to see if this is working well  -Vaccines  -Next visit in 1 year or sooner if concerns      College Age/Young Adult Health      Karime OSMIN Carvalho  December 1, 2023    Visit Vitals  /62 (BP Location: LUE - Left upper extremity, Patient Position: Sitting, Cuff Size: Regular)   Pulse 78   Temp 98 °F (36.7 °C) (Oral)   Ht 6' 0.25\" (1.835 m)   Wt 79 kg (174 lb 2.6 oz)   LMP 11/14/2023 (Exact Date)   BMI 23.46 kg/m²     Weight: 174.16 lbs      Congratulations!  As you leave high school for a working career, the , or college, you are beginning one of the most exciting times of your life.  You definitely have more independence, more responsibility, and more exposure to new ideas and experiences, both good and bad.    Most people your age are at one of the healthiest points in their lives.  Complete physical exams, for example, are recommended only every 5-10 years for both males and females.  In addition to complete exams, women need to schedule brief annual exams to ensure reproductive health.  These annual exams can be done by a gynecologist (a specialist in diseases of the female organs), or by your general physician.  Your physical exams may have been done up to now by a pediatrician.  If so, it is time to seek care from an internal medicine specialist (a physician who specializes in the non-surgical care of adults), an internal medicine pediatrician (a physician trained in the non-surgical care of both children and adults), or a family practice physician (a physician trained in the care of children and adults and in pregnancy care).    FOR THOSE STARTING A JOB:  1.  Health insurance is often a benefit provided by your employer.   If you have trouble getting insurance, start by asking your parents for advice.  2.  Make sure that you have had three doses of HEPATITIS B vaccine.  Approximately 6,000 people per year die from this viral disease of the liver.  Although Hepatitis B is often transmitted sexually or through contact with contaminated blood products, only one-half of infected individuals can identify a possible source for their infection.  There is no curative treatment, so it is very important to be vaccinated in order to prevent the disease.  3.  If you will be moving to the [facility] or [facility].S. to go to school or work, or if you plan to travel outside the [place] for work, school, or vacation, you should have two doses of HEPATITIS A vaccine.  Hepatitis A is a viral liver disease which, while less serious than Hepatitis B, still kills about 100 persons per year in the [place].  It can give you unpleasant stomach symptoms for months.  4.  Keep an emergency phone contact number in your wallet or purse.  It is not usually necessary to know your blood type, but a card listing any serious medical conditions, kept in your wallet or purse, or a medical alert bracelet can be very important.    FOR THOSE GOING INTO THE :  Your healthcare will be provided by the .    FOR THOSE GOING TO COLLEGE:  1.  A physical examination is often required before you arrive on campus.   2.  Some campuses in urban areas or campuses with many students from other countries require a tuberculosis skin test.  This is usually administered at the time of the physical exam, but it must be \"checked\" by a healthcare professional between 48 and 72 hours after it is applied.   3.  Make sure that your immunizations are up to date.  A DIPHTHERIA/ TETANUS immunization (often abbreviated dT or Td) is recommended every ten years to protect against those diseases.  Tetanus is a germ that causes a disease commonly called \"lock-jaw\" because of  the severe muscle spasms associated with it.  The disease results most often from infected cuts.  The death rate, even with modern medical care, is 3 percent.  Diphtheria is a severe form of sore throat that used to cause about 5,000 deaths per year in 1900.  It still occasionally kills adults who have forgotten to keep their immunizations up to date.    4.  Most colleges require you either to be immunized against measles and chickenpox (also called Varicella) or to provide proof that you have had those diseases.  Your parents, your doctors, and sometimes the health department will have the information that you need.  5.  Make sure that you have had three doses of HEPATITIS B vaccine.  Approximately 6,000 people per year die from this viral disease of the liver.  Although Hepatitis B is often transmitted sexually or through contact with contaminated blood products, only one-half of infected individuals can identify a possible source for their infection.  There is no curative treatment, so it is very important to be vaccinated in order to prevent the disease.  6.  If you will be moving to the [facility] or [facility].S. to go to school or work, or if you plan to travel outside the [place] for work, school, or vacation, you should have two doses of HEPATITIS A vaccine.  Hepatitis A is a viral liver disease which, while less serious than Hepatitis B, still kills about 100 persons per year in the [place].  It can give you unpleasant stomach symptoms for months.  7.  Students living in dormitories are four times more likely to contract Meningococcal Meningitis (a bacterial brain infection) than people living elsewhere in the community.  This risk applies only to the first year in the dormitory.  The risk for students living in dormitories compared with persons living elsewhere, increases from about one case per 250,000 persons per year to one case per 60,000 persons per year.  This would be about one student  per year on a large [employer] campus.  The death rate is about one in ten, and the brain damage rate is about one in three.  A single dose of either meningococcal vaccine - Menactra (R) or Menomune (R) - will lower that risk by 80 percent.  The risk of serious side effects from the vaccine is significantly lower than the risk of the illness.  8.  Take a copy of your parents' health insurance card along with you to college if you are still covered under their policy.  9.  Keep an emergency phone contact number in your wallet or purse.  It is not usually necessary to know your blood type, but a card listing any serious medical conditions, kept in your wallet or purse, or a medical alert bracelet can be very important.    HEALTH MAINTENANCE:  There are many things to learn about and experience in young adult life.  They occur at all hours of the day and night.  It is often tempting to \"burn the candle at both ends.\"  Young adults are often so excited about these opportunities that they cheat themselves out of sleep, exercise, and a healthy diet.  When they do eat, they may overeat or eat too rapidly.    LACK OF SLEEP:  Failure to get enough sleep can cause:  1.  Low resistance to physical illness.  Many students become sick after prolonged periods with too little sleep.   2.  Low resistance to mental illness.  College and work are exciting, but any change in your life is stressful.  About 15 percent of young adults experience excessive anxiety.  Common worries include school grades, money, being away from home, and whether or not you are \"normal\" compared to the wider variety of people you meet outside your own community.  Another 15 percent suffer from depression for many of the same reasons, and sometimes without any apparent reason.  Anxiety and depression may be normal responses to stress when they last less than two weeks and correct without treatment.  3.  Poor grades at school, mistakes in your work, and increased  accidents at work.  4.  Automobile accidents.  Many young people (and older people) overestimate their ability to drive safely when tired.    LACK OF EXERCISE:  Failure to get enough exercise may cause many of the same problems as lack of sleep.  Light from the sun and exercise are good ways to prevent anxiety and depression.  Exercise also helps you sleep better and improves your alertness.  This is one of the reasons that many student-athletes do well in school despite having less time for study.    POOR DIET:  It is hard to prove that poor diet leads to short-term health problems other than \"the freshman 15\" (the average weight gain in the freshmen year of college).  For obvious reasons, this is less likely at jobs that require physical labor or in the armed services.  High-fat diets from junk food can cause grogginess, which leads some people to try to compensate with increased caffeine intake, cigarettes, or even illegal stimulants.    Menstruating females, especially athletes, may do better with a vitamin with both folic acid and iron.  About one-third of teen females are low in iron and more tired than necessary, even without being anemic.  Folic acid is necessary to prevent birth defects, and there is some evidence that it may lower the risk of heart disease.    HEALTH HAZARDS:  The top five causes of death in the young adult age range in the [place] are accidents, assault, suicide, cancer, and heart trouble.  These conditions cause 85 percent of the deaths in this age range.  Of the top three, accidents cause about 50 percent of the deaths, and assault and suicide cause another 12-15 percent each.  Cancer and heart trouble are much rarer.  The heart trouble often has been present since birth.  At this point we do not know how to prevent cancer in this age range, although we know that smoking dramatically increases your risk of cancer.  Note that the top three causes can either be prevented or treated.  Alcohol  in particular is involved in almost 1,400 deaths per year in this age range.  30 percent of surveyed young adults have driven after drinking.  Please don't drink and drive.    Assault is not always preventable, but the risk can be reduced.  If you are moving to an area that is unfamiliar to you, learn safe habits:  1.  Talk to others at work or school about dangerous areas. If you are going to college, call the campus security office and ask if there have been dangerous areas on or off campus, especially after dark.  2.  If a campus or business offers an escort service from classrooms to dorms or parking lots, use it.  One or more unfortunate incidents usually led to the establishment of the service.  3.  Never get in a car where the occupants have been drinking.  4.  If drinking will be part of an evening of entertainment, choose a \"designated .\"  5.  Females should never accept a drink or even a smoke when they did not see the source.  It may seem cool or even economical to have someone buy you drinks and cigarettes, but you run the risk of unknowingly taking \"date rape\" drugs.  Alcoholic drinks themselves are a danger in that one out of every ten college students (male and female) reports an unplanned sexual exposure after drinking.  6.  When walking on or off campus in areas that are dark, remote, or unfamiliar, travel in groups.  This applies to both males and females.  It is more fun anyway.  7.  Stick to well-lit, well-witnessed, and preferably well-patrolled areas at night.  8.  Go on \"group dates\" if you are dating and prefer dates in public, visible places.  If your date has grown up with violence as a way of solving problems, he or she may seem very nice most of the time but resort to violence when frustrated or disappointed or under stress.  Listen to what your dates say.  Watch to see if they treat others with respect.  Jealousy is associated most often with violence in males and retaliation for  embarrassment in females.  It is not entirely clear how often \"date rape\" occurs.  Almost one-third of young adults have witnessed violence in their own families.  Violence in male-female relationships occurs at similar levels in high school and college.  Interestingly, milder forms of violence are perpetrated more often by females and more severe violence (with a weapon) is done more often by males.  There is more violence when males and females live together than just in the course of dating.  In the sad event that you feel trapped in a violent relationship and cannot bring yourself to ask for help from parents or from the college, please be aware that there is a national Domestic Violence Hot Line at 1-521.739.3387.    PREGNANCY:  Pregnancy is a BIG deal.  A woman who becomes pregnant has a one in 2,000 chance of dying even with modern medical care.  Pregnancy is not a risk-free situation.  Women who get pregnant outside of marriage have a very high risk of living in poverty for much of their adult lives.  It is pretty clear that the woman bears most of the risk.  If a couple has sexual intercourse without any form of birth control in this age range during the first two weeks after the menstrual period, there is one chance in 12 that the woman will become pregnant.    About one-half of female high school seniors have had sexual intercourse by graduation.  That number increases to 70 percent in the next four years.  When birth control is used correctly, about one sexually active woman out of 100 becomes pregnant each year.  Most young adults use birth control inconsistently, which can increase the pregnancy rate to one in 20 sexually active females per year.    If you do decide to use birth control, make sure that you get it from a reputable source.  Some Internet birth control pills do not work very well.  When someone tells you that a method of birth control is \"safe,\" ask about the types of complications and  how often they happen.  When someone tells you that a method of birth control is \"effective,\" ask how many women get pregnant per year when using this method.  We are not advocating birth control.  In fact, abstinence is the only completely effective way of avoiding pregnancy and venereal disease.  But if a couple chooses otherwise, it is best to choose with the most information possible.  Please do not let someone encourage you to do something against your wishes.  If they do so, you are not getting the respect you deserve.    SEXUALLY TRANSMITTED DISEASES:   There are approximately 16 million cases per year of venereal disease in the [place] among young people between the ages of 15 and 25.  Some are brief and treatable.  Some are chronic (herpes), and can only be controlled.  Some are fatal (AIDS).  Some are treatable but have minimal symptoms, yet can lead to enough permanent damage to the reproductive organs that a woman may have trouble having children (chlamydia).  Some can cause cancer (genital warts).  Learn about them.  Avoid having multiple partners.  Try to know as much as possible about your partner if you plan to have a sexual relationship.  Stay away from people who are defensive or evasive about their background.  Avoid people who say, \"What, don't you like me?\" or \"Do I look infected?\" or any of dozens of statements that make you seem unreasonable for asking.  When you consider that about one in four persons in your age range has or has had a venereal disease, it is reasonable for you to ask questions and to get honest answers about your partner's sexual history.  Don't be cheated and don't be hurt.    SUBSTANCE ABUSE:  Smoking:  About 30 percent of college students smoke.  The smoking rate is increasing among young adults, even as it declines among other age groups.  98 percent of smokers in this age range say that they are aware of the hazards of smoking.  The reasons why people smoke anyway include  advertising, role models (parents, movie stars, bosses, professors), weight control, and stimulation from the effects of nicotine.  In addition, half of college smokers blame stress, and a third use smoking as a way of self-medicating depression.  The downsides of smoking are well known.  Half of all smokers don't reach their life expectancy for a wide variety of reasons.  It is a difficult addiction to overcome.  Quitting often requires multiple attempts and may be helped by nicotine replacement (gum or patches) or by prescription medicine.     Alcohol:  The rate of alcohol use is about the same for college students as for other high school graduates.  Almost 80 percent of young adults drink alcohol from time to time, but the rate of binge drinking is higher in college students.  Binge drinking is defined as having more than five drinks (four for females) at a sitting.  40 percent of college students binge-drink.  80 percent of fraternity members and only a slightly smaller proportion of sorority members binge-drink.  Binge drinking can be habit-forming.  One-half of males and one-third of females who engage in binge drinking as young adults still drink heavily at age 30.    Marijuana: Despite many media reports to the contrary, college students are less likely to use marijuana than others the same age.  It is likely that inhaled smoke from marijuana, like every other form of inhaled smoke -  from cigarettes, cars, factories, etc. - eventually will be found to lead to lung cancer.  Smoking marijuana causes coughing, wheezing, and bronchitis.  It makes driving dangerous and is at least psychologically addicting (\"I can't relax without it\").  When used chronically, it creates a state of apathy (stereotypically, the \"heavy doper\") that makes achievement of life goals more difficult.  Particularly if you think that you might be using marijuana to cope with intense nervousness, fearfulness, sadness, loneliness, or  depression, get help from home, from your physician, from your college, or from your employee assistance program.    Other Drugs:  The most frightening illegal drugs are cocaine and amphetamines.  They are highly addictive and are associated with violent behavior and mental illness.  Because they are illegal, they are not standardized, and deaths and overdoses occur when a drug of unexpectedly high potency is used.  Both are associated with occasional strokes, high blood pressure, and paranoia.    Sedatives can be frightening when they are not used for legitimate purposes under the supervision of a physician.  As \"date-rape\" drugs, they are often used to take advantage of others.  They have a wide variety of street names and are hard to detect by testing.  In overdose they can kill.      People who sell illegal substances are not careful about drug purity or your personal safety.  The drug dealers whom you may encounter at college or work are likely to be more dangerous than the ones who sell to high school students.    YOU NEED TO BE ESPECIALLY CAREFUL ABOUT SMOKING, DRINKING, OR DRUG USE IF THERE IS A HISTORY OF ADDICTION IN YOUR FAMILY.  DISCUSS THIS WITH YOUR PHSYICIAN.    MENTAL HEALTH:  About 15 percent of college students suffer from serious anxiety.  They may have phobias (e.g., specific fears of crowds or germs), panic attacks (rapid onset of panic and often intense fears or intense, uncomfortable trouble with breathing, racing heart, or dizziness), or general restlessness or nervousness.  Another 15 percent have depression for longer than two weeks at a time.  These statistics are not greatly different between college and non-college young adults.  These conditions do need some form of treatment.      Depression has almost one chance in five of leading to suicide if not treated.  If you tend to be sad, take an active role in treating problems when you have them.  You may want to try self-help methods such  as exercise, meditation, increased time outdoors, a low-fat diet, avoiding caffeine (especially in the 6 hours prior to sleep), and avoiding unnecessarily stressful situations (intense movies, plays, dares, or dangerous situations).  If these methods do not help, or if you cannot bring yourself to try them, GET PROFESSIONAL HELP.  Get help IMMEDIATELY if you answer \"yes\" to any of the first six of the following questions or \"no,\" \"nothing,\" or \"no one\" to any of the last three questions:    1.  Are your negative feelings strong enough that life does not seem worth living?  2.  Have you thought about suicide, and if so are the thoughts frequent or prolonged?  3.  Does it take a lot of effort to resist thoughts of suicide?  4.  Is the pain/sadness intolerable?   5.  Have you ever made any specific suicide plans? (For example, have you planned a location or method, or have you accumulated supplies?)  6.  Do you think that life after death would be much better?  7.  What holds you back?   8.  If it's getting better, could you resist suicide if thoughts returned?  9.  Who is the easiest person to turn to if thoughts of suicide come up?    WE REALIZE THAT, AS PHYSICIANS, WE TEND TO FOCUS ON DISEASES AND PROBLEMS.  MOST YOUNG ADULTS HAVE LONG AND HAPPY LIVES, AND THE NEXT YEARS FOR YOU ARE LIKELY TO BE AS INTERESTING, EXCITING, AND POSITIVE AS ANY.  WE WISH YOU THE BEST, BUT WE ARE AVAILABLE AND WOULD LIKE TO HELP IF YOU ARE HAVING PROBLEMS.    Thank you for entrusting your care to [facility].   decreased strength

## 2023-12-06 ENCOUNTER — EMERGENCY (EMERGENCY)
Facility: HOSPITAL | Age: 53
LOS: 1 days | End: 2023-12-06
Admitting: STUDENT IN AN ORGANIZED HEALTH CARE EDUCATION/TRAINING PROGRAM
Payer: MEDICARE

## 2023-12-06 DIAGNOSIS — Z98.890 OTHER SPECIFIED POSTPROCEDURAL STATES: Chronic | ICD-10-CM

## 2023-12-06 DIAGNOSIS — Z90.49 ACQUIRED ABSENCE OF OTHER SPECIFIED PARTS OF DIGESTIVE TRACT: Chronic | ICD-10-CM

## 2023-12-06 PROCEDURE — L9981: CPT

## 2023-12-07 ENCOUNTER — INPATIENT (INPATIENT)
Facility: HOSPITAL | Age: 53
LOS: 21 days | Discharge: HOME HEALTH | End: 2023-12-29
Attending: INTERNAL MEDICINE | Admitting: INTERNAL MEDICINE
Payer: MEDICARE

## 2023-12-07 ENCOUNTER — APPOINTMENT (OUTPATIENT)
Dept: ULTRASOUND IMAGING | Facility: HOSPITAL | Age: 53
End: 2023-12-07

## 2023-12-07 VITALS
OXYGEN SATURATION: 97 % | DIASTOLIC BLOOD PRESSURE: 69 MMHG | RESPIRATION RATE: 16 BRPM | HEART RATE: 69 BPM | TEMPERATURE: 98 F | SYSTOLIC BLOOD PRESSURE: 127 MMHG

## 2023-12-07 DIAGNOSIS — R51.9 HEADACHE, UNSPECIFIED: ICD-10-CM

## 2023-12-07 DIAGNOSIS — I50.9 HEART FAILURE, UNSPECIFIED: ICD-10-CM

## 2023-12-07 DIAGNOSIS — E11.9 TYPE 2 DIABETES MELLITUS WITHOUT COMPLICATIONS: ICD-10-CM

## 2023-12-07 DIAGNOSIS — I10 ESSENTIAL (PRIMARY) HYPERTENSION: ICD-10-CM

## 2023-12-07 DIAGNOSIS — Z98.890 OTHER SPECIFIED POSTPROCEDURAL STATES: Chronic | ICD-10-CM

## 2023-12-07 DIAGNOSIS — Z29.9 ENCOUNTER FOR PROPHYLACTIC MEASURES, UNSPECIFIED: ICD-10-CM

## 2023-12-07 DIAGNOSIS — Z79.899 OTHER LONG TERM (CURRENT) DRUG THERAPY: ICD-10-CM

## 2023-12-07 DIAGNOSIS — G93.2 BENIGN INTRACRANIAL HYPERTENSION: ICD-10-CM

## 2023-12-07 DIAGNOSIS — Z90.49 ACQUIRED ABSENCE OF OTHER SPECIFIED PARTS OF DIGESTIVE TRACT: Chronic | ICD-10-CM

## 2023-12-07 LAB
APTT BLD: 27.7 SEC — SIGNIFICANT CHANGE UP (ref 24.5–35.6)
APTT BLD: 27.7 SEC — SIGNIFICANT CHANGE UP (ref 24.5–35.6)
GLUCOSE BLDC GLUCOMTR-MCNC: 270 MG/DL — HIGH (ref 70–99)
GLUCOSE BLDC GLUCOMTR-MCNC: 270 MG/DL — HIGH (ref 70–99)
GLUCOSE BLDC GLUCOMTR-MCNC: 298 MG/DL — HIGH (ref 70–99)
GLUCOSE BLDC GLUCOMTR-MCNC: 298 MG/DL — HIGH (ref 70–99)
INR BLD: 0.92 RATIO — SIGNIFICANT CHANGE UP (ref 0.85–1.18)
INR BLD: 0.92 RATIO — SIGNIFICANT CHANGE UP (ref 0.85–1.18)
PROTHROM AB SERPL-ACNC: 10.4 SEC — SIGNIFICANT CHANGE UP (ref 9.5–13)
PROTHROM AB SERPL-ACNC: 10.4 SEC — SIGNIFICANT CHANGE UP (ref 9.5–13)

## 2023-12-07 PROCEDURE — 96376 TX/PRO/DX INJ SAME DRUG ADON: CPT

## 2023-12-07 PROCEDURE — 80053 COMPREHEN METABOLIC PANEL: CPT

## 2023-12-07 PROCEDURE — 99223 1ST HOSP IP/OBS HIGH 75: CPT

## 2023-12-07 PROCEDURE — 99285 EMERGENCY DEPT VISIT HI MDM: CPT

## 2023-12-07 PROCEDURE — 85025 COMPLETE CBC W/AUTO DIFF WBC: CPT

## 2023-12-07 PROCEDURE — 36415 COLL VENOUS BLD VENIPUNCTURE: CPT

## 2023-12-07 PROCEDURE — 96375 TX/PRO/DX INJ NEW DRUG ADDON: CPT

## 2023-12-07 PROCEDURE — 99254 IP/OBS CNSLTJ NEW/EST MOD 60: CPT

## 2023-12-07 PROCEDURE — 76937 US GUIDE VASCULAR ACCESS: CPT | Mod: 26

## 2023-12-07 PROCEDURE — 99285 EMERGENCY DEPT VISIT HI MDM: CPT | Mod: 25

## 2023-12-07 PROCEDURE — 96374 THER/PROPH/DIAG INJ IV PUSH: CPT

## 2023-12-07 PROCEDURE — 70496 CT ANGIOGRAPHY HEAD: CPT | Mod: 26,MA

## 2023-12-07 PROCEDURE — 83690 ASSAY OF LIPASE: CPT

## 2023-12-07 PROCEDURE — 99222 1ST HOSP IP/OBS MODERATE 55: CPT

## 2023-12-07 PROCEDURE — 36000 PLACE NEEDLE IN VEIN: CPT

## 2023-12-07 PROCEDURE — 70450 CT HEAD/BRAIN W/O DYE: CPT | Mod: MA

## 2023-12-07 RX ORDER — ACETAMINOPHEN 500 MG
1000 TABLET ORAL ONCE
Refills: 0 | Status: COMPLETED | OUTPATIENT
Start: 2023-12-07 | End: 2023-12-07

## 2023-12-07 RX ORDER — DEXTROSE 50 % IN WATER 50 %
25 SYRINGE (ML) INTRAVENOUS ONCE
Refills: 0 | Status: DISCONTINUED | OUTPATIENT
Start: 2023-12-07 | End: 2023-12-24

## 2023-12-07 RX ORDER — MORPHINE SULFATE 50 MG/1
4 CAPSULE, EXTENDED RELEASE ORAL ONCE
Refills: 0 | Status: DISCONTINUED | OUTPATIENT
Start: 2023-12-07 | End: 2023-12-07

## 2023-12-07 RX ORDER — ONDANSETRON 8 MG/1
4 TABLET, FILM COATED ORAL EVERY 8 HOURS
Refills: 0 | Status: DISCONTINUED | OUTPATIENT
Start: 2023-12-07 | End: 2023-12-29

## 2023-12-07 RX ORDER — SIMETHICONE 80 MG/1
1 TABLET, CHEWABLE ORAL
Qty: 0 | Refills: 0 | DISCHARGE

## 2023-12-07 RX ORDER — SODIUM CHLORIDE 9 MG/ML
1000 INJECTION INTRAMUSCULAR; INTRAVENOUS; SUBCUTANEOUS ONCE
Refills: 0 | Status: COMPLETED | OUTPATIENT
Start: 2023-12-07 | End: 2023-12-07

## 2023-12-07 RX ORDER — DEXTROSE 50 % IN WATER 50 %
25 SYRINGE (ML) INTRAVENOUS ONCE
Refills: 0 | Status: DISCONTINUED | OUTPATIENT
Start: 2023-12-07 | End: 2023-12-29

## 2023-12-07 RX ORDER — LISINOPRIL 2.5 MG/1
10 TABLET ORAL DAILY
Refills: 0 | Status: DISCONTINUED | OUTPATIENT
Start: 2023-12-07 | End: 2023-12-24

## 2023-12-07 RX ORDER — SODIUM CHLORIDE 9 MG/ML
1000 INJECTION, SOLUTION INTRAVENOUS
Refills: 0 | Status: DISCONTINUED | OUTPATIENT
Start: 2023-12-07 | End: 2023-12-24

## 2023-12-07 RX ORDER — INSULIN GLARGINE 100 [IU]/ML
40 INJECTION, SOLUTION SUBCUTANEOUS AT BEDTIME
Refills: 0 | Status: DISCONTINUED | OUTPATIENT
Start: 2023-12-07 | End: 2023-12-12

## 2023-12-07 RX ORDER — LANOLIN ALCOHOL/MO/W.PET/CERES
3 CREAM (GRAM) TOPICAL ONCE
Refills: 0 | Status: COMPLETED | OUTPATIENT
Start: 2023-12-07 | End: 2023-12-07

## 2023-12-07 RX ORDER — ACETAZOLAMIDE 250 MG/1
500 TABLET ORAL
Refills: 0 | Status: DISCONTINUED | OUTPATIENT
Start: 2023-12-07 | End: 2023-12-29

## 2023-12-07 RX ORDER — INSULIN LISPRO 100/ML
VIAL (ML) SUBCUTANEOUS
Refills: 0 | Status: DISCONTINUED | OUTPATIENT
Start: 2023-12-07 | End: 2023-12-23

## 2023-12-07 RX ORDER — NALOXEGOL OXALATE 12.5 MG/1
25 TABLET, FILM COATED ORAL DAILY
Refills: 0 | Status: DISCONTINUED | OUTPATIENT
Start: 2023-12-07 | End: 2023-12-29

## 2023-12-07 RX ORDER — BUMETANIDE 0.25 MG/ML
2 INJECTION INTRAMUSCULAR; INTRAVENOUS DAILY
Refills: 0 | Status: DISCONTINUED | OUTPATIENT
Start: 2023-12-07 | End: 2023-12-29

## 2023-12-07 RX ORDER — ACETAMINOPHEN 500 MG
650 TABLET ORAL EVERY 6 HOURS
Refills: 0 | Status: DISCONTINUED | OUTPATIENT
Start: 2023-12-07 | End: 2023-12-20

## 2023-12-07 RX ORDER — GLUCAGON INJECTION, SOLUTION 0.5 MG/.1ML
1 INJECTION, SOLUTION SUBCUTANEOUS ONCE
Refills: 0 | Status: DISCONTINUED | OUTPATIENT
Start: 2023-12-07 | End: 2023-12-24

## 2023-12-07 RX ORDER — DEXTROSE 50 % IN WATER 50 %
12.5 SYRINGE (ML) INTRAVENOUS ONCE
Refills: 0 | Status: DISCONTINUED | OUTPATIENT
Start: 2023-12-07 | End: 2023-12-24

## 2023-12-07 RX ORDER — DEXTROSE 50 % IN WATER 50 %
15 SYRINGE (ML) INTRAVENOUS ONCE
Refills: 0 | Status: DISCONTINUED | OUTPATIENT
Start: 2023-12-07 | End: 2023-12-24

## 2023-12-07 RX ORDER — METOCLOPRAMIDE HCL 10 MG
10 TABLET ORAL ONCE
Refills: 0 | Status: COMPLETED | OUTPATIENT
Start: 2023-12-07 | End: 2023-12-07

## 2023-12-07 RX ORDER — LANOLIN ALCOHOL/MO/W.PET/CERES
3 CREAM (GRAM) TOPICAL AT BEDTIME
Refills: 0 | Status: DISCONTINUED | OUTPATIENT
Start: 2023-12-07 | End: 2023-12-19

## 2023-12-07 RX ORDER — BACLOFEN 100 %
5 POWDER (GRAM) MISCELLANEOUS EVERY 8 HOURS
Refills: 0 | Status: DISCONTINUED | OUTPATIENT
Start: 2023-12-07 | End: 2023-12-29

## 2023-12-07 RX ORDER — INSULIN LISPRO 100/ML
10 VIAL (ML) SUBCUTANEOUS
Refills: 0 | Status: DISCONTINUED | OUTPATIENT
Start: 2023-12-07 | End: 2023-12-13

## 2023-12-07 RX ORDER — OXYCODONE HYDROCHLORIDE 5 MG/1
10 TABLET ORAL EVERY 6 HOURS
Refills: 0 | Status: DISCONTINUED | OUTPATIENT
Start: 2023-12-07 | End: 2023-12-08

## 2023-12-07 RX ORDER — SPIRONOLACTONE 25 MG/1
25 TABLET, FILM COATED ORAL DAILY
Refills: 0 | Status: DISCONTINUED | OUTPATIENT
Start: 2023-12-07 | End: 2023-12-29

## 2023-12-07 RX ORDER — INSULIN LISPRO 100/ML
10 VIAL (ML) SUBCUTANEOUS
Qty: 0 | Refills: 0 | DISCHARGE

## 2023-12-07 RX ORDER — INSULIN LISPRO 100/ML
VIAL (ML) SUBCUTANEOUS AT BEDTIME
Refills: 0 | Status: DISCONTINUED | OUTPATIENT
Start: 2023-12-07 | End: 2023-12-23

## 2023-12-07 RX ORDER — HYDROMORPHONE HYDROCHLORIDE 2 MG/ML
1 INJECTION INTRAMUSCULAR; INTRAVENOUS; SUBCUTANEOUS ONCE
Refills: 0 | Status: DISCONTINUED | OUTPATIENT
Start: 2023-12-07 | End: 2023-12-07

## 2023-12-07 RX ADMIN — MORPHINE SULFATE 4 MILLIGRAM(S): 50 CAPSULE, EXTENDED RELEASE ORAL at 12:50

## 2023-12-07 RX ADMIN — Medication 3 MILLIGRAM(S): at 17:31

## 2023-12-07 RX ADMIN — Medication 400 MILLIGRAM(S): at 11:30

## 2023-12-07 RX ADMIN — Medication 3 MILLIGRAM(S): at 21:09

## 2023-12-07 RX ADMIN — HYDROMORPHONE HYDROCHLORIDE 1 MILLIGRAM(S): 2 INJECTION INTRAMUSCULAR; INTRAVENOUS; SUBCUTANEOUS at 21:33

## 2023-12-07 RX ADMIN — SODIUM CHLORIDE 1000 MILLILITER(S): 9 INJECTION INTRAMUSCULAR; INTRAVENOUS; SUBCUTANEOUS at 10:15

## 2023-12-07 RX ADMIN — HYDROMORPHONE HYDROCHLORIDE 1 MILLIGRAM(S): 2 INJECTION INTRAMUSCULAR; INTRAVENOUS; SUBCUTANEOUS at 22:05

## 2023-12-07 RX ADMIN — NALOXEGOL OXALATE 25 MILLIGRAM(S): 12.5 TABLET, FILM COATED ORAL at 20:43

## 2023-12-07 RX ADMIN — ACETAZOLAMIDE 500 MILLIGRAM(S): 250 TABLET ORAL at 20:43

## 2023-12-07 RX ADMIN — Medication 10 MILLIGRAM(S): at 10:15

## 2023-12-07 NOTE — ED ADULT NURSE NOTE - NSFALLUNIVINTERV_ED_ALL_ED
Bed/Stretcher in lowest position, wheels locked, appropriate side rails in place/Call bell, personal items and telephone in reach/Instruct patient to call for assistance before getting out of bed/chair/stretcher/Non-slip footwear applied when patient is off stretcher/[place] to call system/Physically safe environment - no spills, clutter or unnecessary equipment/Purposeful proactive rounding/Room/bathroom lighting operational, light cord in reach Bed/Stretcher in lowest position, wheels locked, appropriate side rails in place/Call bell, personal items and telephone in reach/Instruct patient to call for assistance before getting out of bed/chair/stretcher/Non-slip footwear applied when patient is off stretcher/[place] to call system/Physically safe environment - no spills, clutter or unnecessary equipment/Purposeful proactive rounding/Room/bathroom lighting operational, light cord in reach

## 2023-12-07 NOTE — ED PROVIDER NOTE - CLINICAL SUMMARY MEDICAL DECISION MAKING FREE TEXT BOX
53-year-old female past medical history significant for CHF, hypertension, hyperlipidemia, diabetes, pseudotumor cerebri, chronic pain (intercostal neuralgia/complex regional pain syndrome), status post spinal neurostimulator, right-sided facial liposarcoma?  Presents as transfer for neuro/optho consult. Will consult specialties. will attempt LP for  pseudotumor cerebri, will get CTV. will require admission.

## 2023-12-07 NOTE — ED ADULT NURSE REASSESSMENT NOTE - NS ED NURSE REASSESS COMMENT FT1
report given to [facility] 3 RN. NAD. pt denies SOB, chest pain, dizziness, weakness, urinary symptoms, HA, n/v/d, fevers, chills. respirations are even and un labored. safety precautions maintained.

## 2023-12-07 NOTE — ED ADULT TRIAGE NOTE - CHIEF COMPLAINT QUOTE
Pt brought in by EMS from [place] for neuro consult and for Ophtho. PT denies chest pain, sob, n/v/d, fever or chills. DMII Pt brought in by EMS from [place] for neuro consult and for Ophtho. PT denies chest pain, sob, n/v/d, fever or chills. DMII

## 2023-12-07 NOTE — PHARMACOTHERAPY INTERVENTION NOTE - COMMENTS
Medication history is incomplete. Unable to verify patient's medication list with two sources. Will reach out to outpatient pharmacy when they reopen.

## 2023-12-07 NOTE — CONSULT NOTE ADULT - ASSESSMENT
Assessment and Recommendations:  53y female with a past medical history/ocular history of *** consulted for ***, found to have ***    Seen and discussed with ***.    Outpatient Follow-up: Patient should follow-up with his/her ophthalmologist or with [facility] Department of Ophthalmology within 1 week of after discharge at:    [address]  [place] [address]  [phone]    Mary Reyes MD, PGY-3  Contact: [employer]     Assessment and Recommendations:  53y female with a past medical history/ocular history of *** consulted for ***, found to have ***    Seen and discussed with ***.    Outpatient Follow-up: Patient should follow-up with his/her ophthalmologist or with [facility] Department of Ophthalmology within 1 week of after discharge at:    [address]  [place] [address]  [phone]    Mary Reyes MD, PGY-3  Contact: [employer]     Assessment and Recommendations:  53y female with a past medical history/ocular history of CHF, HTN, HLD, DM, Idiopathic intracranial hypertension (diagnosed 2020), chronic back pain, consulted for multiple episodes of transient vision loss, found to have no acute findings on ophthalmological exam.     #Binocular transient vision loss  #History of IIH  - Pt describes about 6 episodes of transient binocular vision loss over the past 24 hours lasting about 30 seconds  - 30 seconds is longer than would typically expect for transient vision loss secondary to IIH, however pt has headaches and pulsatile tinnitus consistent with Dx of IIH   - Given bilateral nature of vision loss and the fact that it lasted seconds, not minutes, makes embolus less likely   - Pt currently on Diamox 500mg/day   - VA 20/20 OD, 20/20 OS, PERRL no RAPD, IOP wnl OU  - Color plates 12/12 OD, 12/12 OS, however 20% red desaturation OD. Subjective brightness equal OU.   - EOM Full no pain no diplopia   - DFE with sharp pink discs OU  - Lack of papilledema does not rule out increased intracranial pressure. Recommend imaging, followed by lumbar puncture as per neurology management if physical exam findings are suggestive of increased intracranial pressure.  - LP attempted 10x in ED with no success  - Recommend LP with recorded opening pressure with IR   - Recommend further imaging and workup per neurology   - If OP elevated on LP with likely need increased dose of diamox  - Will arrange outpatient followup with neuro ophthalmology     #Glaucoma suspect OU  - CDR 0.5 OU  - IOP ULN OU  - Recommend outpatient followup      Seen and discussed with Dr. Sutton, attending    Outpatient Follow-up: Patient should follow-up with his/her ophthalmologist or with [facility] Department of Ophthalmology within 1 week of after discharge at:    [address]  [place], [place] [id number] [phone]    Mary Reyes MD, PGY-3  Contact: [employer]     Assessment and Recommendations:  53y female with a past medical history/ocular history of CHF, HTN, HLD, DM, Idiopathic intracranial hypertension (diagnosed 2020), chronic back pain, consulted for multiple episodes of transient vision loss, found to have no acute findings on ophthalmological exam.     #Binocular transient vision loss  #History of IIH  - Pt describes about 6 episodes of transient binocular vision loss over the past 24 hours lasting about 30 seconds  - 30 seconds is longer than would typically expect for transient vision loss secondary to IIH, however pt has headaches and pulsatile tinnitus consistent with Dx of IIH   - Given bilateral nature of vision loss and the fact that it lasted seconds, not minutes, makes embolus less likely   - Pt currently on Diamox 500mg/day   - VA 20/20 OD, 20/20 OS, PERRL no RAPD, IOP wnl OU  - Color plates 12/12 OD, 12/12 OS, however 20% red desaturation OD. Subjective brightness equal OU.   - EOM Full no pain no diplopia   - DFE with sharp pink discs OU  - Lack of papilledema does not rule out increased intracranial pressure. Recommend imaging, followed by lumbar puncture as per neurology management if physical exam findings are suggestive of increased intracranial pressure.  - LP attempted 10x in ED with no success  - Recommend LP with recorded opening pressure with IR   - Recommend further imaging and workup per neurology   - If OP elevated on LP with likely need increased dose of diamox  - Will arrange outpatient followup with neuro ophthalmology     #Glaucoma suspect OU  - CDR 0.5 OU  - IOP ULN OU  - Recommend outpatient followup      Seen and discussed with Dr. Sutton, attending    Outpatient Follow-up: Patient should follow-up with his/her ophthalmologist or with [facility] Department of Ophthalmology within 1 week of after discharge at:    [address]  [place], [place] [id number] [phone]    Mary Reyes MD, PGY-3  Contact: [employer]     Assessment and Recommendations:  53y female with a past medical history/ocular history of CHF, HTN, HLD, DM, Idiopathic intracranial hypertension (diagnosed 2020), chronic back pain, consulted for multiple episodes of transient vision loss, found to have no acute findings on ophthalmological exam.     #Binocular transient vision loss  #History of IIH  - Pt describes about 6 episodes of transient binocular vision loss over the past 24 hours lasting about 30 seconds  - 30 seconds is longer than would typically expect for transient vision loss secondary to IIH, however pt has headaches and pulsatile tinnitus consistent with Dx of IIH   - Given bilateral nature of vision loss and the fact that it lasted seconds, not minutes, makes embolus less likely   - Pt currently on Diamox 500mg/day   - VA 20/20 OD, 20/20 OS, PERRL no RAPD, IOP wnl OU  - Color plates 12/12 OD, 12/12 OS, however 20% red desaturation OD. Subjective brightness equal OU.   - EOM Full no pain no diplopia   - DFE with sharp pink discs OU  - CT head and CTV with no acute intracranial hemorrhage, mass effect, or shift of the midline structures. No evidence of venous sinus thrombosis.  - Lack of papilledema does not rule out increased intracranial pressure. Recommend imaging, followed by lumbar puncture as per neurology management if physical exam findings are suggestive of increased intracranial pressure.  - LP attempted 10x in ED with no success  - Recommend LP with recorded opening pressure with IR   - Recommend further imaging and workup per neurology   - If OP elevated on LP with likely need increased dose of diamox  - Will arrange outpatient followup with neuro ophthalmology     #Glaucoma suspect OU  - CDR 0.5 OU  - IOP ULN OU  - Recommend outpatient followup      Seen and discussed with Dr. Sutton, attending    Outpatient Follow-up: Patient should follow-up with his/her ophthalmologist or with [facility] Department of Ophthalmology within 1 week of after discharge at:    [address]  [place] [address]  [phone]    Mary Reyes MD, PGY-3  Contact: [employer]     Assessment and Recommendations:  53y female with a past medical history/ocular history of CHF, HTN, HLD, DM, Idiopathic intracranial hypertension (diagnosed 2020), chronic back pain, consulted for multiple episodes of transient vision loss, found to have no acute findings on ophthalmological exam.     #Binocular transient vision loss  #History of IIH  - Pt describes about 6 episodes of transient binocular vision loss over the past 24 hours lasting about 30 seconds  - 30 seconds is longer than would typically expect for transient vision loss secondary to IIH, however pt has headaches and pulsatile tinnitus consistent with Dx of IIH   - Given bilateral nature of vision loss and the fact that it lasted seconds, not minutes, makes embolus less likely   - Pt currently on Diamox 500mg/day   - VA 20/20 OD, 20/20 OS, PERRL no RAPD, IOP wnl OU  - Color plates 12/12 OD, 12/12 OS, however 20% red desaturation OD. Subjective brightness equal OU.   - EOM Full no pain no diplopia   - DFE with sharp pink discs OU  - CT head and CTV with no acute intracranial hemorrhage, mass effect, or shift of the midline structures. No evidence of venous sinus thrombosis.  - Lack of papilledema does not rule out increased intracranial pressure. Recommend imaging, followed by lumbar puncture as per neurology management if physical exam findings are suggestive of increased intracranial pressure.  - LP attempted 10x in ED with no success  - Recommend LP with recorded opening pressure with IR   - Recommend further imaging and workup per neurology   - If OP elevated on LP with likely need increased dose of diamox  - Will arrange outpatient followup with neuro ophthalmology     #Glaucoma suspect OU  - CDR 0.5 OU  - IOP ULN OU  - Recommend outpatient followup      Seen and discussed with Dr. Sutton, attending    Outpatient Follow-up: Patient should follow-up with his/her ophthalmologist or with [facility] Department of Ophthalmology within 1 week of after discharge at:    [address]  [place] [address]  [phone]    Mary Reyes MD, PGY-3  Contact: [employer]

## 2023-12-07 NOTE — CONSULT NOTE ADULT - ATTENDING COMMENTS
53y female with a past medical history/ocular history of CHF, HTN, HLD, DM, Idiopathic intracranial hypertension (diagnosed 2020), chronic back pain, consulted for multiple episodes of transient vision loss.   Transient episodes of complete vision loss lasting about 30seconds - likely 2/2 IIH, from TVO. Pt also has HA and tinnitus. c/w Diamox, needs Lumbar puncture for OP. DFE with sharp discs. CTH and CTV neg. pending IR guided lumbar puncture. Agree w rest of resident note

## 2023-12-07 NOTE — CONSULT NOTE ADULT - SUBJECTIVE AND OBJECTIVE BOX
Neurology - Consult Note    -  Spectra: [phone] ([facility]), [phone] ([facility])  -    HPI: Patient ROBIN LÓPEZ is a 53y (1970) woman with a PMHx significant for CHF, HTN, HLD, DM, Idiopathic intracranial hypertension, chronic back pain (intercostal neuralgia/complex regional pain syndrome) s/p spinal stimulator presenting as a transfer from [facility] for further evaluation of IIH.     Patient reporting worsening generalized headache for the past 3 days and says she is having constant ringing in her ears. Also, having pain with eye movements. Currently, on diamox.     CTH at [facility] was negative for acute findings. Last MRI brain was done on 8/11/2023, which showed a partial empty sella with a mildly enlarged/remodeled pituitary fossa.       Review of Systems:    All other review of systems is negative unless indicated above.    Allergies:  amitriptyline (Other)      PMHx/PSHx/Family Hx: As above, otherwise see below   Diabetes    HTN (hypertension)    Neuropathy    Obesity    Former cigarette smoker    Marijuana smoker, continuous    Neuralgia    Cardiac abnormality    Diabetes    Essential hypertension    IBS (irritable bowel syndrome)    Complex regional pain syndrome type 1        Social Hx:  No current use of tobacco, alcohol, or illicit drugs  Lives with ***    Medications:  MEDICATIONS  (STANDING):  acetaminophen   IVPB .. 1000 milliGRAM(s) IV Intermittent Once    MEDICATIONS  (PRN):      Vitals:  T(C): 36.6 (12-07-23 @ 09:08), Max: 36.8 (12-06-23 @ 19:46)  HR: 69 (12-07-23 @ 09:08) (69 - 76)  BP: 127/69 (12-07-23 @ 09:08) (127/69 - 160/84)  RR: 16 (12-07-23 @ 09:08) (16 - 18)  SpO2: 97% (12-07-23 @ 09:08) (96% - 99%)    Physical Examination: INCOMPLETE  General - NAD  Cardiovascular - Peripheral pulses palpable, no edema  Eyes - Fundoscopy with flat, sharp optic discs and no hemorrhage or exudates; Fundoscopy not well visualized; Fundoscopy not performed due to safety precautions in the setting of the COVID-19 pandemic    Neurologic Exam:  Mental status - Awake, Alert, Oriented to person, place, and time. Speech fluent, repetition and naming intact. Follows simple and complex commands. Attention/concentration, recent and remote memory (including registration and recall), and fund of knowledge intact    Cranial nerves - PERRLA, VFF, EOMI, face sensation (V1-V3) intact b/l, facial strength intact without asymmetry b/l, hearing intact b/l, palate with symmetric elevation, trapezius OR sternocleidomastiod 5/5 strength b/l, tongue midline on protrusion with full lateral movement    Motor - Normal bulk and tone throughout. No pronator drift.  Strength testing            Deltoid      Biceps      Triceps     Wrist Extension    Wrist Flexion     Interossei         R            5                 5               5                     5                              5                        5                 5  L             5                 5               5                     5                              5                        5                 5              Hip Flexion    Hip Extension    Knee Flexion    Knee Extension    Dorsiflexion    Plantar Flexion  R              5                           5                       5                           5                            5                          5  L              5                           5                        5                           5                            5                          5    Sensation - Light touch/temperature OR pain/vibration intact throughout    DTR's -             Biceps      Triceps     Brachioradialis      Patellar    Ankle    Toes/plantar response  R             2+             2+                  2+                       2+            2+                 Down  L              2+             2+                 2+                        2+           2+                 Down    Coordination - Finger to Nose intact b/l. No tremors appreciated    Gait and station - Normal casual gait. Romberg (-)    Labs:                        12.0   12.48 )-----------( 480      ( 06 Dec 2023 22:33 )             37.4     12-06    138  |  108  |  17  ----------------------------<  190<H>  3.3<L>   |  25  |  1.02    Ca    8.8      06 Dec 2023 22:33    TPro  8.0  /  Alb  3.3<L>  /  TBili  0.4  /  DBili  x   /  AST  12  /  ALT  31  /  AlkPhos  131<H>  12-06    CAPILLARY BLOOD GLUCOSE      POCT Blood Glucose.: 293 mg/dL (07 Dec 2023 10:10)    LIVER FUNCTIONS - ( 06 Dec 2023 22:33 )  Alb: 3.3 g/dL / Pro: 8.0 g/dL / ALK PHOS: 131 U/L / ALT: 31 U/L DA / AST: 12 U/L / GGT: x               CSF:                  Radiology:  < from: CT Head No Cont (12.07.23 @ 00:33) >  IMPRESSION:  No acute intracranial hemorrhage or mass effect. No hydrocephalus.    < end of copied text >  < from: CT Head No Cont (07.20.23 @ 18:54) >  IMPRESSION:  No intracranial mass effect or hemorrhage.    No fracture.    < end of copied text >  < from: MR Angio Neck w/wo IV Cont (05.10.21 @ 18:12) >  Mild volume loss, microvascular disease, mineralization/calcification of the globus pallidus, substantia nigra, and red nuclei, falx ossification, no restricted diffusion, hemorrhage or midline shift, basal cisterns are patent.    Partially empty sella, prominent bilateral optic nerve sheaths flattening of the posterior globe margins, findings seen with pseudotumor cerebri syndrome.    Tortuous, patent intracranial and extracranial MR angiography, variation in anatomy as detailed above, no ICA origin hemodynamically significant stenosis by NASCET criteria.    < end of copied text >       Neurology - Consult Note    -  Spectra: [phone] ([facility]), [phone] ([facility])  -    HPI: Patient ROBIN LÓPEZ is a 53y (1970) woman with a PMHx significant for CHF, HTN, HLD, DM, Idiopathic intracranial hypertension, chronic back pain (intercostal neuralgia/complex regional pain syndrome) s/p spinal stimulator presenting as a transfer from [facility] for further evaluation of IIH.     Patient reporting worsening generalized headache for the past 3 days and says she is having constant ringing in her ears. Also, having pain with eye movements. Currently, on diamox.     CTH at [facility] was negative for acute findings. Last MRI brain was done on 8/11/2023, which showed a partial empty sella with a mildly enlarged/remodeled pituitary fossa.       Review of Systems:    All other review of systems is negative unless indicated above.    Allergies:  amitriptyline (Other)      PMHx/PSHx/Family Hx: As above, otherwise see below   Diabetes    HTN (hypertension)    Neuropathy    Obesity    Former cigarette smoker    Marijuana smoker, continuous    Neuralgia    Cardiac abnormality    Diabetes    Essential hypertension    IBS (irritable bowel syndrome)    Complex regional pain syndrome type 1        Social Hx:  No current use of tobacco, alcohol, or illicit drugs  Lives with ***    Medications:  MEDICATIONS  (STANDING):  acetaminophen   IVPB .. 1000 milliGRAM(s) IV Intermittent Once    MEDICATIONS  (PRN):      Vitals:  T(C): 36.6 (12-07-23 @ 09:08), Max: 36.8 (12-06-23 @ 19:46)  HR: 69 (12-07-23 @ 09:08) (69 - 76)  BP: 127/69 (12-07-23 @ 09:08) (127/69 - 160/84)  RR: 16 (12-07-23 @ 09:08) (16 - 18)  SpO2: 97% (12-07-23 @ 09:08) (96% - 99%)    Physical Examination: INCOMPLETE  General - NAD  Cardiovascular - Peripheral pulses palpable, no edema  Eyes - Fundoscopy with flat, sharp optic discs and no hemorrhage or exudates; Fundoscopy not well visualized; Fundoscopy not performed due to safety precautions in the setting of the COVID-19 pandemic    Neurologic Exam:  Mental status - Awake, Alert, Oriented to person, place, and time. Speech fluent, repetition and naming intact. Follows simple and complex commands. Attention/concentration, recent and remote memory (including registration and recall), and fund of knowledge intact    Cranial nerves - PERRLA, VFF, EOMI, face sensation (V1-V3) intact b/l, facial strength intact without asymmetry b/l, hearing intact b/l, palate with symmetric elevation, trapezius OR sternocleidomastiod 5/5 strength b/l, tongue midline on protrusion with full lateral movement    Motor - Normal bulk and tone throughout. No pronator drift.  Strength testing            Deltoid      Biceps      Triceps     Wrist Extension    Wrist Flexion     Interossei         R            5                 5               5                     5                              5                        5                 5  L             5                 5               5                     5                              5                        5                 5              Hip Flexion    Hip Extension    Knee Flexion    Knee Extension    Dorsiflexion    Plantar Flexion  R              5                           5                       5                           5                            5                          5  L              5                           5                        5                           5                            5                          5    Sensation - Light touch/temperature OR pain/vibration intact throughout    DTR's -             Biceps      Triceps     Brachioradialis      Patellar    Ankle    Toes/plantar response  R             2+             2+                  2+                       2+            2+                 Down  L              2+             2+                 2+                        2+           2+                 Down    Coordination - Finger to Nose intact b/l. No tremors appreciated    Gait and station - Normal casual gait. Romberg (-)    Labs:                        12.0   12.48 )-----------( 480      ( 06 Dec 2023 22:33 )             37.4     12-06    138  |  108  |  17  ----------------------------<  190<H>  3.3<L>   |  25  |  1.02    Ca    8.8      06 Dec 2023 22:33    TPro  8.0  /  Alb  3.3<L>  /  TBili  0.4  /  DBili  x   /  AST  12  /  ALT  31  /  AlkPhos  131<H>  12-06    CAPILLARY BLOOD GLUCOSE      POCT Blood Glucose.: 293 mg/dL (07 Dec 2023 10:10)    LIVER FUNCTIONS - ( 06 Dec 2023 22:33 )  Alb: 3.3 g/dL / Pro: 8.0 g/dL / ALK PHOS: 131 U/L / ALT: 31 U/L DA / AST: 12 U/L / GGT: x               CSF:                  Radiology:  < from: CT Head No Cont (12.07.23 @ 00:33) >  IMPRESSION:  No acute intracranial hemorrhage or mass effect. No hydrocephalus.    < end of copied text >  < from: CT Head No Cont (07.20.23 @ 18:54) >  IMPRESSION:  No intracranial mass effect or hemorrhage.    No fracture.    < end of copied text >  < from: MR Angio Neck w/wo IV Cont (05.10.21 @ 18:12) >  Mild volume loss, microvascular disease, mineralization/calcification of the globus pallidus, substantia nigra, and red nuclei, falx ossification, no restricted diffusion, hemorrhage or midline shift, basal cisterns are patent.    Partially empty sella, prominent bilateral optic nerve sheaths flattening of the posterior globe margins, findings seen with pseudotumor cerebri syndrome.    Tortuous, patent intracranial and extracranial MR angiography, variation in anatomy as detailed above, no ICA origin hemodynamically significant stenosis by NASCET criteria.    < end of copied text >       Neurology - Consult Note    -  Spectra: [phone] ([facility]), [phone] ([facility])  -    HPI: Patient ROBIN LÓPEZ is a 53y (1970) woman with a PMHx significant for CHF, HTN, HLD, DM, Idiopathic intracranial hypertension (diagnosed 2020), chronic back pain (intercostal neuralgia/complex regional pain syndrome) s/p spinal stimulator presenting as a transfer from [facility] for further evaluation of IIH.     Patient reporting worsening generalized headache (on the right side above ear, worse with standing and walking around) for the past 3 days and says she is having constant wooshing in her ears. Endorsing nausea. Also, having pain with eye movements, light sensitivity and blurry vision. Currently, on diamox 250 mg bid, no dosing change in the last 3 years. Has lost 150 lbs in the last 3 years. Saw Agnes Frank from neurology. Does not follow with neuro-optho. Denies focal weakness and focal numbness that is new. Says she has peripheral neuropathy in her LUE and LLE, which has been numb.     CTH at [facility] was negative for acute findings. Last MRI brain was done on 8/11/2023, which showed a partial empty sella with a mildly enlarged/remodeled pituitary fossa.       Review of Systems:    All other review of systems is negative unless indicated above.    Allergies:  amitriptyline (Other)      PMHx/PSHx/Family Hx: As above, otherwise see below   Diabetes    HTN (hypertension)    Neuropathy    Obesity    Former cigarette smoker    Marijuana smoker, continuous    Neuralgia    Cardiac abnormality    Diabetes    Essential hypertension    IBS (irritable bowel syndrome)    Complex regional pain syndrome type 1        Social Hx:  No current use of tobacco, alcohol, or illicit drugs  Moved out of nursing home two months ago.    Medications:  MEDICATIONS  (STANDING):  acetaminophen   IVPB .. 1000 milliGRAM(s) IV Intermittent Once    MEDICATIONS  (PRN):      Vitals:  T(C): 36.6 (12-07-23 @ 09:08), Max: 36.8 (12-06-23 @ 19:46)  HR: 69 (12-07-23 @ 09:08) (69 - 76)  BP: 127/69 (12-07-23 @ 09:08) (127/69 - 160/84)  RR: 16 (12-07-23 @ 09:08) (16 - 18)  SpO2: 97% (12-07-23 @ 09:08) (96% - 99%)    Physical Examination:  General - NAD, obese   Eyes -Fundoscopy not performed due to safety precautions in the setting of the COVID-19 pandemic    Neurologic Exam:  Mental status - Awake, Alert, Oriented to person, place, and time. Speech fluent. Follows simple and complex commands. Attention/concentration and fund of knowledge intact    Cranial nerves - PERRL, VFF, EOMI, face sensation (V1-V3) intact b/l, facial strength intact without asymmetry b/l, hearing intact b/l, palate with symmetric elevation, tongue midline on protrusion with full lateral movement    Motor - Normal bulk and tone throughout.   Strength testing            Deltoid         Biceps      Triceps     Wrist Extension           Wrist Flexion           R            5                 5               5                     5                              5                  5  L             5                 5               5                     5                              5                  5              Hip Flexion        Hip Extension        Knee Flexion        Knee Extension          Dorsiflexion    Plantar Flexion  R              5                           5                       5                           5                            5                          5  L              5                           5                        5                           5                            5                          5    Sensation - LUE with decreased sensation to light touch and pinprick. Left lower leg with decreased sensation to light touch and pinprick; left thigh with normal sensation. RUE and RLE with normal sensation throughout.    DTR's -             Biceps      Triceps     Brachioradialis      Patellar    Ankle    Toes/plantar response  R             2+           honey                   2+               2+         2+                 mute  L              2+           honey                  2+                2+         2+                 mute    Coordination - Finger to Nose intact b/l. No tremors appreciated    Station normal. Gait HONEY.    Labs:                        12.0   12.48 )-----------( 480      ( 06 Dec 2023 22:33 )             37.4     12-06    138  |  108  |  17  ----------------------------<  190<H>  3.3<L>   |  25  |  1.02    Ca    8.8      06 Dec 2023 22:33    TPro  8.0  /  Alb  3.3<L>  /  TBili  0.4  /  DBili  x   /  AST  12  /  ALT  31  /  AlkPhos  131<H>  12-06    CAPILLARY BLOOD GLUCOSE      POCT Blood Glucose.: 293 mg/dL (07 Dec 2023 10:10)    LIVER FUNCTIONS - ( 06 Dec 2023 22:33 )  Alb: 3.3 g/dL / Pro: 8.0 g/dL / ALK PHOS: 131 U/L / ALT: 31 U/L DA / AST: 12 U/L / GGT: x               CSF:                  Radiology:  < from: CT Head No Cont (12.07.23 @ 00:33) >  IMPRESSION:  No acute intracranial hemorrhage or mass effect. No hydrocephalus.    < end of copied text >  < from: CT Head No Cont (07.20.23 @ 18:54) >  IMPRESSION:  No intracranial mass effect or hemorrhage.    No fracture.    < end of copied text >  < from: MR Angio Neck w/wo IV Cont (05.10.21 @ 18:12) >  Mild volume loss, microvascular disease, mineralization/calcification of the globus pallidus, substantia nigra, and red nuclei, falx ossification, no restricted diffusion, hemorrhage or midline shift, basal cisterns are patent.    Partially empty sella, prominent bilateral optic nerve sheaths flattening of the posterior globe margins, findings seen with pseudotumor cerebri syndrome.    Tortuous, patent intracranial and extracranial MR angiography, variation in anatomy as detailed above, no ICA origin hemodynamically significant stenosis by NASCET criteria.    < end of copied text >       Neurology - Consult Note    -  Spectra: [phone] ([facility]), [phone] ([facility])  -    HPI: Patient ROBIN LÓPEZ is a 53y (1970) woman with a PMHx significant for CHF, HTN, HLD, DM, Idiopathic intracranial hypertension (diagnosed 2020), chronic back pain (intercostal neuralgia/complex regional pain syndrome) s/p spinal stimulator presenting as a transfer from [facility] for further evaluation of IIH.     Patient reporting worsening generalized headache (on the right side above ear, worse with standing and walking around) for the past 3 days and says she is having constant wooshing in her ears. Endorsing nausea. Also, having pain with eye movements, light sensitivity and blurry vision. Currently, on diamox 250 mg bid, no dosing change in the last 3 years. Has lost 150 lbs in the last 3 years. Saw Agnes Frank from neurology. Does not follow with neuro-optho. Denies focal weakness and focal numbness that is new. Says she has peripheral neuropathy in her LUE and LLE, which has been numb.     CTH at [facility] was negative for acute findings. Last MRI brain was done on 8/11/2023, which showed a partial empty sella with a mildly enlarged/remodeled pituitary fossa.       Review of Systems:    All other review of systems is negative unless indicated above.    Allergies:  amitriptyline (Other)      PMHx/PSHx/Family Hx: As above, otherwise see below   Diabetes    HTN (hypertension)    Neuropathy    Obesity    Former cigarette smoker    Marijuana smoker, continuous    Neuralgia    Cardiac abnormality    Diabetes    Essential hypertension    IBS (irritable bowel syndrome)    Complex regional pain syndrome type 1        Social Hx:  No current use of tobacco, alcohol, or illicit drugs  Moved out of nursing home two months ago.    Medications:  MEDICATIONS  (STANDING):  acetaminophen   IVPB .. 1000 milliGRAM(s) IV Intermittent Once    MEDICATIONS  (PRN):      Vitals:  T(C): 36.6 (12-07-23 @ 09:08), Max: 36.8 (12-06-23 @ 19:46)  HR: 69 (12-07-23 @ 09:08) (69 - 76)  BP: 127/69 (12-07-23 @ 09:08) (127/69 - 160/84)  RR: 16 (12-07-23 @ 09:08) (16 - 18)  SpO2: 97% (12-07-23 @ 09:08) (96% - 99%)    Physical Examination:  General - NAD, obese   Eyes -Fundoscopy not performed due to safety precautions in the setting of the COVID-19 pandemic    Neurologic Exam:  Mental status - Awake, Alert, Oriented to person, place, and time. Speech fluent. Follows simple and complex commands. Attention/concentration and fund of knowledge intact    Cranial nerves - PERRL, VFF, EOMI, face sensation (V1-V3) intact b/l, facial strength intact without asymmetry b/l, hearing intact b/l, palate with symmetric elevation, tongue midline on protrusion with full lateral movement    Motor - Normal bulk and tone throughout.   Strength testing            Deltoid         Biceps      Triceps     Wrist Extension           Wrist Flexion           R            5                 5               5                     5                              5                  5  L             5                 5               5                     5                              5                  5              Hip Flexion        Hip Extension        Knee Flexion        Knee Extension          Dorsiflexion    Plantar Flexion  R              5                           5                       5                           5                            5                          5  L              5                           5                        5                           5                            5                          5    Sensation - LUE with decreased sensation to light touch and pinprick. Left lower leg with decreased sensation to light touch and pinprick; left thigh with normal sensation. RUE and RLE with normal sensation throughout.    DTR's -             Biceps      Triceps     Brachioradialis      Patellar    Ankle    Toes/plantar response  R             2+           honey                   2+               2+         2+                 mute  L              2+           honey                  2+                2+         2+                 mute    Coordination - Finger to Nose intact b/l. No tremors appreciated    Station normal. Gait HONEY.    Labs:                        12.0   12.48 )-----------( 480      ( 06 Dec 2023 22:33 )             37.4     12-06    138  |  108  |  17  ----------------------------<  190<H>  3.3<L>   |  25  |  1.02    Ca    8.8      06 Dec 2023 22:33    TPro  8.0  /  Alb  3.3<L>  /  TBili  0.4  /  DBili  x   /  AST  12  /  ALT  31  /  AlkPhos  131<H>  12-06    CAPILLARY BLOOD GLUCOSE      POCT Blood Glucose.: 293 mg/dL (07 Dec 2023 10:10)    LIVER FUNCTIONS - ( 06 Dec 2023 22:33 )  Alb: 3.3 g/dL / Pro: 8.0 g/dL / ALK PHOS: 131 U/L / ALT: 31 U/L DA / AST: 12 U/L / GGT: x               CSF:                  Radiology:  < from: CT Head No Cont (12.07.23 @ 00:33) >  IMPRESSION:  No acute intracranial hemorrhage or mass effect. No hydrocephalus.    < end of copied text >  < from: CT Head No Cont (07.20.23 @ 18:54) >  IMPRESSION:  No intracranial mass effect or hemorrhage.    No fracture.    < end of copied text >  < from: MR Angio Neck w/wo IV Cont (05.10.21 @ 18:12) >  Mild volume loss, microvascular disease, mineralization/calcification of the globus pallidus, substantia nigra, and red nuclei, falx ossification, no restricted diffusion, hemorrhage or midline shift, basal cisterns are patent.    Partially empty sella, prominent bilateral optic nerve sheaths flattening of the posterior globe margins, findings seen with pseudotumor cerebri syndrome.    Tortuous, patent intracranial and extracranial MR angiography, variation in anatomy as detailed above, no ICA origin hemodynamically significant stenosis by NASCET criteria.    < end of copied text >       Neurology - Consult Note    -  Spectra: [phone] ([facility]), [phone] ([facility])  -    HPI: Patient ROBIN LÓPEZ is a 53y (1970) woman with a PMHx significant for CHF, HTN, HLD, DM, Idiopathic intracranial hypertension (diagnosed 2020), chronic back pain (intercostal neuralgia/complex regional pain syndrome) s/p spinal stimulator presenting as a transfer from [facility] for further evaluation of IIH.     Patient reporting worsening generalized headache (on the right side above ear, worse with standing and walking around) for the past 3 days and says she is having constant wooshing in her ears. Endorsing nausea. Also, having pain with eye movements, light sensitivity and blurry vision. Currently, on diamox 250 mg bid, no dosing change in the last 3 years. Has lost 150 lbs in the last 3 years. Saw Agnes Frank from neurology. Does not follow with neuro-optho. Denies focal weakness and focal numbness that is new. Says she has peripheral neuropathy in her LUE and LLE, which has been numb.     CTH at [facility] was negative for acute findings. Last MRI brain was done on 8/11/2023, which showed a partial empty sella with a mildly enlarged/remodeled pituitary fossa.       Review of Systems:    All other review of systems is negative unless indicated above.    Allergies:  amitriptyline (Other)      PMHx/PSHx/Family Hx: As above, otherwise see below   Diabetes    HTN (hypertension)    Neuropathy    Obesity    Former cigarette smoker    Marijuana smoker, continuous    Neuralgia    Cardiac abnormality    Diabetes    Essential hypertension    IBS (irritable bowel syndrome)    Complex regional pain syndrome type 1        Social Hx:  No current use of tobacco, alcohol, or illicit drugs  Moved out of nursing home two months ago.    Medications:  MEDICATIONS  (STANDING):  acetaminophen   IVPB .. 1000 milliGRAM(s) IV Intermittent Once    MEDICATIONS  (PRN):      Vitals:  T(C): 36.6 (12-07-23 @ 09:08), Max: 36.8 (12-06-23 @ 19:46)  HR: 69 (12-07-23 @ 09:08) (69 - 76)  BP: 127/69 (12-07-23 @ 09:08) (127/69 - 160/84)  RR: 16 (12-07-23 @ 09:08) (16 - 18)  SpO2: 97% (12-07-23 @ 09:08) (96% - 99%)    Physical Examination:  General - NAD, obese   Eyes -Fundoscopy not performed due to safety precautions in the setting of the COVID-19 pandemic    Neurologic Exam:  Mental status - Awake, Alert, Oriented to person, place, and time. Speech fluent. Follows simple and complex commands. Attention/concentration and fund of knowledge intact    Cranial nerves - PERRL, VFF, EOMI, face sensation (V1-V3) intact b/l, facial strength intact without asymmetry b/l, hearing intact b/l, palate with symmetric elevation, tongue midline on protrusion with full lateral movement    Motor - Normal bulk and tone throughout.   Strength testing            Deltoid         Biceps      Triceps     Wrist Extension           Wrist Flexion           R            5                 5               5                     5                              5                  5  L             5                 5               5                     5                              5                  5              Hip Flexion        Hip Extension        Knee Flexion        Knee Extension          Dorsiflexion    Plantar Flexion  R              5                           5                       5                           5                            5                          5  L              5                           5                        5                           5                            5                          5    Sensation - LUE with decreased sensation to light touch and pinprick. Left lower leg with decreased sensation to light touch and pinprick; left thigh with normal sensation. RUE and RLE with normal sensation throughout.    DTR's -             Biceps      Triceps     Brachioradialis      Patellar    Ankle    Toes/plantar response  R             2+           honey                   2+               2+         2+                 mute  L              2+           honey                  2+                2+         2+                 mute    Coordination - Finger to Nose intact b/l. No tremors appreciated    Station normal. Gait HONEY.    Labs:                        12.0   12.48 )-----------( 480      ( 06 Dec 2023 22:33 )             37.4     12-06    138  |  108  |  17  ----------------------------<  190<H>  3.3<L>   |  25  |  1.02    Ca    8.8      06 Dec 2023 22:33    TPro  8.0  /  Alb  3.3<L>  /  TBili  0.4  /  DBili  x   /  AST  12  /  ALT  31  /  AlkPhos  131<H>  12-06    CAPILLARY BLOOD GLUCOSE      POCT Blood Glucose.: 293 mg/dL (07 Dec 2023 10:10)    LIVER FUNCTIONS - ( 06 Dec 2023 22:33 )  Alb: 3.3 g/dL / Pro: 8.0 g/dL / ALK PHOS: 131 U/L / ALT: 31 U/L DA / AST: 12 U/L / GGT: x               CSF:                  Radiology:  < from: CT Venogram Brain w/ IV Cont (12.07.23 @ 11:29) >  CT venogram head: No evidence of venous sinus thrombosis.    < end of copied text >      < from: CT Head No Cont (12.07.23 @ 00:33) >  IMPRESSION:  No acute intracranial hemorrhage or mass effect. No hydrocephalus.    < end of copied text >  < from: CT Head No Cont (07.20.23 @ 18:54) >  IMPRESSION:  No intracranial mass effect or hemorrhage.    No fracture.    < end of copied text >  < from: MR Angio Neck w/wo IV Cont (05.10.21 @ 18:12) >  Mild volume loss, microvascular disease, mineralization/calcification of the globus pallidus, substantia nigra, and red nuclei, falx ossification, no restricted diffusion, hemorrhage or midline shift, basal cisterns are patent.    Partially empty sella, prominent bilateral optic nerve sheaths flattening of the posterior globe margins, findings seen with pseudotumor cerebri syndrome.    Tortuous, patent intracranial and extracranial MR angiography, variation in anatomy as detailed above, no ICA origin hemodynamically significant stenosis by NASCET criteria.    < end of copied text >       Neurology - Consult Note    -  Spectra: [id number] ([facility]), [id number] ([facility])  -    HPI: Patient ROBIN LÓPEZ is a 53y (1970) woman with a PMHx significant for CHF, HTN, HLD, DM, Idiopathic intracranial hypertension (diagnosed 2020), chronic back pain (intercostal neuralgia/complex regional pain syndrome) s/p spinal stimulator presenting as a transfer from [facility] for further evaluation of IIH.     Patient reporting worsening generalized headache (on the right side above ear, worse with standing and walking around) for the past 3 days and says she is having constant wooshing in her ears. Endorsing nausea. Also, having pain with eye movements, light sensitivity and blurry vision. Currently, on diamox 250 mg bid, no dosing change in the last 3 years. Has lost 150 lbs in the last 3 years. Saw Agnes Frank from neurology. Does not follow with neuro-optho. Denies focal weakness and focal numbness that is new. Says she has peripheral neuropathy in her LUE and LLE, which has been numb.     CTH at [facility] was negative for acute findings. Last MRI brain was done on 8/11/2023, which showed a partial empty sella with a mildly enlarged/remodeled pituitary fossa.       Review of Systems:    All other review of systems is negative unless indicated above.    Allergies:  amitriptyline (Other)      PMHx/PSHx/Family Hx: As above, otherwise see below   Diabetes    HTN (hypertension)    Neuropathy    Obesity    Former cigarette smoker    Marijuana smoker, continuous    Neuralgia    Cardiac abnormality    Diabetes    Essential hypertension    IBS (irritable bowel syndrome)    Complex regional pain syndrome type 1        Social Hx:  No current use of tobacco, alcohol, or illicit drugs  Moved out of nursing home two months ago.    Medications:  MEDICATIONS  (STANDING):  acetaminophen   IVPB .. 1000 milliGRAM(s) IV Intermittent Once    MEDICATIONS  (PRN):      Vitals:  T(C): 36.6 (12-07-23 @ 09:08), Max: 36.8 (12-06-23 @ 19:46)  HR: 69 (12-07-23 @ 09:08) (69 - 76)  BP: 127/69 (12-07-23 @ 09:08) (127/69 - 160/84)  RR: 16 (12-07-23 @ 09:08) (16 - 18)  SpO2: 97% (12-07-23 @ 09:08) (96% - 99%)    Physical Examination:  General - NAD, obese   Eyes -Fundoscopy not performed due to safety precautions in the setting of the COVID-19 pandemic    Neurologic Exam:  Mental status - Awake, Alert, Oriented to person, place, and time. Speech fluent. Follows simple and complex commands. Attention/concentration and fund of knowledge intact    Cranial nerves - PERRL, VFF, EOMI, face sensation (V1-V3) intact b/l, facial strength intact without asymmetry b/l, hearing intact b/l, palate with symmetric elevation, tongue midline on protrusion with full lateral movement    Motor - Normal bulk and tone throughout.   Strength testing            Deltoid         Biceps      Triceps     Wrist Extension           Wrist Flexion           R            5                 5               5                     5                              5                  5  L             5                 5               5                     5                              5                  5              Hip Flexion        Hip Extension        Knee Flexion        Knee Extension          Dorsiflexion    Plantar Flexion  R              5                           5                       5                           5                            5                          5  L              5                           5                        5                           5                            5                          5    Sensation - LUE with decreased sensation to light touch and pinprick. Left lower leg with decreased sensation to light touch and pinprick; left thigh with normal sensation. RUE and RLE with normal sensation throughout.    DTR's -             Biceps      Triceps     Brachioradialis      Patellar    Ankle    Toes/plantar response  R             2+           honey                   2+               2+         2+                 mute  L              2+           honey                  2+                2+         2+                 mute    Coordination - Finger to Nose intact b/l. No tremors appreciated    Station normal. Gait HONEY.    Labs:                        12.0   12.48 )-----------( 480      ( 06 Dec 2023 22:33 )             37.4     12-06    138  |  108  |  17  ----------------------------<  190<H>  3.3<L>   |  25  |  1.02    Ca    8.8      06 Dec 2023 22:33    TPro  8.0  /  Alb  3.3<L>  /  TBili  0.4  /  DBili  x   /  AST  12  /  ALT  31  /  AlkPhos  131<H>  12-06    CAPILLARY BLOOD GLUCOSE      POCT Blood Glucose.: 293 mg/dL (07 Dec 2023 10:10)    LIVER FUNCTIONS - ( 06 Dec 2023 22:33 )  Alb: 3.3 g/dL / Pro: 8.0 g/dL / ALK PHOS: 131 U/L / ALT: 31 U/L DA / AST: 12 U/L / GGT: x               CSF:                  Radiology:  < from: CT Venogram Brain w/ IV Cont (12.07.23 @ 11:29) >  CT venogram head: No evidence of venous sinus thrombosis.    < end of copied text >      < from: CT Head No Cont (12.07.23 @ 00:33) >  IMPRESSION:  No acute intracranial hemorrhage or mass effect. No hydrocephalus.    < end of copied text >  < from: CT Head No Cont (07.20.23 @ 18:54) >  IMPRESSION:  No intracranial mass effect or hemorrhage.    No fracture.    < end of copied text >  < from: MR Angio Neck w/wo IV Cont (05.10.21 @ 18:12) >  Mild volume loss, microvascular disease, mineralization/calcification of the globus pallidus, substantia nigra, and red nuclei, falx ossification, no restricted diffusion, hemorrhage or midline shift, basal cisterns are patent.    Partially empty sella, prominent bilateral optic nerve sheaths flattening of the posterior globe margins, findings seen with pseudotumor cerebri syndrome.    Tortuous, patent intracranial and extracranial MR angiography, variation in anatomy as detailed above, no ICA origin hemodynamically significant stenosis by NASCET criteria.    < end of copied text >

## 2023-12-07 NOTE — CONSULT NOTE ADULT - ASSESSMENT
Headache  Pulsatile Tinnitus   Idiopathic Intracranial Hypertension     Impression: Worsening positional HA with pulsatile tinnitus iso known IIH likely 2/2 refractory IIH.     Recommendations:   Urgent LP to measure opening pressure   CTV Head w contrast  MRI Brain w/wo, MRI Orbits w/wo   Optho consult  Headache  Pulsatile Tinnitus   Idiopathic Intracranial Hypertension     Impression: Worsening positional HA with pulsatile tinnitus iso known IIH likely 2/2 refractory IIH.     Recommendations:   Urgent LP to measure opening pressure   CTV Head w contrast  MRI Brain w/wo, MRI Orbits w/wo   Optho consult     To be seen by general neurology attending  Headache  Pulsatile Tinnitus   Idiopathic Intracranial Hypertension     Impression: Worsening positional HA with pulsatile tinnitus iso known IIH likely 2/2 refractory IIH.     Recommendations:   Urgent LP to measure opening pressure   CTV Head w contrast-inconclusive for transverse sinus stenosis.   MRV Head with contrast  MRI Brain w/wo, MRI Orbits w/wo   Optho consult     To be seen by general neurology attending  Headache  Pulsatile Tinnitus   Idiopathic Intracranial Hypertension     Impression: Worsening positional HA with pulsatile tinnitus iso known IIH likely 2/2 refractory IIH.     Recommendations:   Urgent LP to measure opening pressure   CTV Head w contrast-inconclusive for transverse sinus stenosis.   MRV Head with contrast  MRI Brain w/wo, MRI Orbits w/wo   Optho consult   Increase diamox to 500 mg bid     To be seen by general neurology attending

## 2023-12-07 NOTE — H&P ADULT - NSHPPHYSICALEXAM_GEN_ALL_CORE
Vital Signs Last 24 Hrs  T(C): 36.6 (07 Dec 2023 17:00), Max: 36.8 (06 Dec 2023 19:46)  T(F): 97.8 (07 Dec 2023 17:00), Max: 98.2 (06 Dec 2023 19:46)  HR: 72 (07 Dec 2023 17:00) (68 - 76)  BP: 136/63 (07 Dec 2023 17:00) (127/69 - 160/84)  BP(mean): --  RR: 18 (07 Dec 2023 17:00) (16 - 18)  SpO2: 99% (07 Dec 2023 17:00) (96% - 100%)    Parameters below as of 07 Dec 2023 17:00  Patient On (Oxygen Delivery Method): room air    GENERAL: NAD, well-developed  HEENT:  Atraumatic, Normocephalic, EOMI, PERRLA, conjunctiva and sclera clear, oral mucosa moist, clear w/o any exudate   NECK: Supple, No JVD  CHEST/LUNG: Clear to auscultation bilaterally; No wheeze  HEART: Regular rate and rhythm; No murmurs, rubs, or gallops  ABDOMEN: Soft, Nontender, Nondistended; Bowel sounds present  EXTREMITIES:  2+ Peripheral Pulses, No clubbing, cyanosis, or edema  PSYCH: AAOx3, normal affect  NEUROLOGY: cranial nerve 2-12 grossly intact. strength 5/5 in UE and LE, sensation grossly intact   SKIN: No rashes or lesions

## 2023-12-07 NOTE — H&P ADULT - NSHPLABSRESULTS_GEN_ALL_CORE
12.0   12.48 )-----------( 480      ( 06 Dec 2023 22:33 )             37.4     Hgb Trend: 12.0<--  12-06    138  |  108  |  17  ----------------------------<  190<H>  3.3<L>   |  25  |  1.02    Ca    8.8      06 Dec 2023 22:33    TPro  8.0  /  Alb  3.3<L>  /  TBili  0.4  /  DBili  x   /  AST  12  /  ALT  31  /  AlkPhos  131<H>  12-06    Creatinine Trend: 1.02<--  PT/INR - ( 07 Dec 2023 13:11 )   PT: 10.4 sec;   INR: 0.92 ratio         PTT - ( 07 Dec 2023 13:11 )  PTT:27.7 sec      Urinalysis Basic - ( 06 Dec 2023 22:33 )    Color: x / Appearance: x / SG: x / pH: x  Gluc: 190 mg/dL / Ketone: x  / Bili: x / Urobili: x   Blood: x / Protein: x / Nitrite: x   Leuk Esterase: x / RBC: x / WBC x   Sq Epi: x / Non Sq Epi: x / Bacteria: x        CT head as reviewed by the radiologist: Head CT: No acute intracranial hemorrhage, mass effect, or shift of the   midline structures.    CT venogram head: No evidence of venous sinus thrombosis.

## 2023-12-07 NOTE — ED PROVIDER NOTE - OBJECTIVE STATEMENT
53-year-old female past medical history significant for CHF, hypertension, hyperlipidemia, diabetes, pseudotumor cerebri, chronic pain (intercostal neuralgia/complex regional pain syndrome), status post spinal neurostimulator, right-sided facial liposarcoma?  Presents as transfer for neuro consult.  Patient presented to outside hospital ED yesterday due to 6 days of worsening ringing/sound in her bilateral ears/head which is now constant.  Patient states pain is similar in quality and quantity to last time she required LP for pseudotumor cerebri.  Patient states she is on acetazolamide as prescribed.  Patient states 2 days ago she had 4-5 episodes of intermittent bilateral blindness in her eyes lasting around 10 seconds.  Patient complaining of headache.  Patient denies systemic signs or symptoms.

## 2023-12-07 NOTE — H&P ADULT - ASSESSMENT
52 yo F with Pmhx of CHF, HTN, HLD, DM, Idiopathic intracranial hypertension (diagnosed 2020), chronic back pain (intercostal neuralgia/complex regional pain syndrome) s/p spinal stimulator presents to the ED with worsening headache, most likely due to Pseudotumor cerebri 54 yo F with Pmhx of CHF, HTN, HLD, DM, Idiopathic intracranial hypertension (diagnosed 2020), chronic back pain (intercostal neuralgia/complex regional pain syndrome) s/p spinal stimulator presents to the ED with worsening headache, most likely due to Pseudotumor cerebri

## 2023-12-07 NOTE — ED ADULT NURSE NOTE - OBJECTIVE STATEMENT
A&Ox4. ambulatory. NAD. PT transferred for optho consult. pt denies SOB, chest pain, dizziness, weakness, urinary symptoms, HA, n/v/d, fevers, chills. respirations are even and un labored. skin intact. safety precautions maintained.

## 2023-12-07 NOTE — H&P ADULT - PROBLEM SELECTOR PLAN 6
Patient was able to provide some of the medication. [facility] Pharmacist emailed. Please f/u with medication list in the AM

## 2023-12-07 NOTE — ED ADULT TRIAGE NOTE - NS ED NURSE AMBULANCES
[facility]

## 2023-12-07 NOTE — CONSULT NOTE ADULT - SUBJECTIVE AND OBJECTIVE BOX
[facility] DEPARTMENT OF OPHTHALMOLOGY - INITIAL ADULT CONSULT  -----------------------------------------------------------------------------  Mary Reyes MD, PGY-3  Contact: TEAMS  -----------------------------------------------------------------------------    HPI: Patient ROBIN LÓPEZ is a 53y (1970) woman with a PMHx significant for CHF, HTN, HLD, DM, Idiopathic intracranial hypertension (diagnosed 2020), chronic back pain (intercostal neuralgia/complex regional pain syndrome) s/p spinal stimulator presenting as a transfer from [facility] for further evaluation of IIH.     Patient reporting worsening generalized headache (on the right side above ear, worse with standing and walking around) for the past 3 days and says she is having constant wooshing in her ears. Endorsing nausea. Also, having pain with eye movements, light sensitivity and blurry vision. Currently, on diamox 250 mg bid, no dosing change in the last 3 years. Has lost 150 lbs in the last 3 years. Saw Agnes Frank from neurology. Does not follow with neuro-optho. Denies focal weakness and focal numbness that is new. Says she has peripheral neuropathy in her LUE and LLE, which has been numb.     CTH at [facility] was negative for acute findings. Last MRI brain was done on 8/11/2023, which showed a partial empty sella with a mildly enlarged/remodeled pituitary fossa.     Interval History: Patient reports history of IIH diagnosed few years ago. She states she takes 500mg Diamox a day. Reports transient blacking out of vision in both eyes lasting up to 10 seconds six times since 12/6/23. States she has had prior headaches and tinnitus, but this was the first time she had visual symptoms. Has not seen ophthalmologist since diagnosis of IIH.     PMH: As above.  POcHx: denies surg/laser  FH: denies glc/amd  Social History: denies etoh/tobacco  Ophthalmic Medications: none  Allergies: NKDA    Review of Systems:  Constitutional: No fever, chills  Eyes: No blurry vision, flashes, floaters, FBS, erythema, discharge, double vision, OU  Neuro: No tremors  Cardiovascular: No chest pain, palpitations  Respiratory: No SOB, no cough  GI: No nausea, vomiting, abdominal pain  : No dysuria  Skin: no rash  Psych: no depression  Endocrine: no polyuria, polydipsia  Heme/lymph: no swelling    VITALS: T(C): 36.8 (12-07-23 @ 12:47)  T(F): 98.2 (12-07-23 @ 12:47), Max: 98.2 (12-06-23 @ 19:46)  HR: 68 (12-07-23 @ 12:47) (68 - 76)  BP: 132/55 (12-07-23 @ 12:47) (127/69 - 160/84)  RR:  (16 - 18)  SpO2:  (96% - 100%)  Wt(kg): --  General: AAO x 3, appropriate mood and affect    Ophthalmology Exam:  Visual acuity (sc): 20/20 OD, 20/20 OS  Pupils: PERRL OU, no RAPD  Ttono: 16 OD 16 OS  Extraocular movements (EOMs): Full OU, no pain, no diplopia  Confrontational Visual Field (CVF): Full OD, Full OS  Color Plates: 12/12 OD, 12/12 OS    Pen Light Exam (PLE)  External: Flat OU  Lids/Lashes/Lacrimal Ducts: Flat OU    Sclera/Conjunctiva: W+Q OU  Cornea: Cl OU  Anterior Chamber: D+F OU    Iris: Flat OU  Lens: Cl OU    Fundus Exam: dilated with 1% tropicamide and 2.5% phenylephrine  Approval obtained from primary team for dilation  Patient aware that pupils can remained dilated for at least 4-6 hours  Exam performed with 20D lens    Vitreous: wnl OU  Disc, cup/disc: sharp and pink, 0.4 OU  Macula: Flat OU  Vessels: Normal caliber OU  Periphery: flat OU    Labs/Imaging:  < from: CT Head No Cont (12.07.23 @ 00:33) >  FINDINGS:  No acute intracranial hemorrhage, mass effect or midline shift.  No CT evidence of acute large vascular territory infarct.  The ventricles and cortical sulci are within normal limits.    The visualized paranasal sinuses and mastoid air cells are well aerated.  No displaced calvarial fracture.    IMPRESSION:  No acute intracranial hemorrhage or mass effect. No hydrocephalus.    < end of copied text >  < from: CT Venogram Brain w/ IV Cont (12.07.23 @ 11:29) >  IMPRESSION:    Head CT: No acute intracranial hemorrhage, mass effect, or shift of the   midline structures.    CT venogram head: No evidence of venous sinus thrombosis.    < end of copied text >   [facility] DEPARTMENT OF OPHTHALMOLOGY - INITIAL ADULT CONSULT  -----------------------------------------------------------------------------  Mary Reyes MD, PGY-3  Contact: TEAMS  -----------------------------------------------------------------------------    HPI: Patient ROBIN LÓPEZ is a 53y (1970) woman with a PMHx significant for CHF, HTN, HLD, DM, Idiopathic intracranial hypertension (diagnosed 2020), chronic back pain (intercostal neuralgia/complex regional pain syndrome) s/p spinal stimulator presenting as a transfer from [facility] for further evaluation of IIH.     Patient reporting worsening generalized headache (on the right side above ear, worse with standing and walking around) for the past 3 days and says she is having constant wooshing in her ears. Endorsing nausea. Also, having pain with eye movements, light sensitivity and blurry vision. Currently, on diamox 250 mg bid, no dosing change in the last 3 years. Has lost 150 lbs in the last 3 years. Saw Agnes Frank from neurology. Does not follow with neuro-optho. Denies focal weakness and focal numbness that is new. Says she has peripheral neuropathy in her LUE and LLE, which has been numb.     CTH at [facility] was negative for acute findings. Last MRI brain was done on 8/11/2023, which showed a partial empty sella with a mildly enlarged/remodeled pituitary fossa.     Interval History: Patient reports history of IIH diagnosed few years ago. She states she takes 500mg Diamox a day. Reports transient blacking out of vision in both eyes lasting up to 10 seconds six times since 12/6/23. States she has had prior headaches and tinnitus, but this was the first time she had visual symptoms. Has not seen ophthalmologist since diagnosis of IIH.     PMH: As above.  POcHx: denies surg/laser  FH: denies glc/amd  Social History: denies etoh/tobacco  Ophthalmic Medications: none  Allergies: NKDA    Review of Systems:  Constitutional: No fever, chills  Eyes: No blurry vision, flashes, floaters, FBS, erythema, discharge, double vision, OU  Neuro: No tremors  Cardiovascular: No chest pain, palpitations  Respiratory: No SOB, no cough  GI: No nausea, vomiting, abdominal pain  : No dysuria  Skin: no rash  Psych: no depression  Endocrine: no polyuria, polydipsia  Heme/lymph: no swelling    VITALS: T(C): 36.8 (12-07-23 @ 12:47)  T(F): 98.2 (12-07-23 @ 12:47), Max: 98.2 (12-06-23 @ 19:46)  HR: 68 (12-07-23 @ 12:47) (68 - 76)  BP: 132/55 (12-07-23 @ 12:47) (127/69 - 160/84)  RR:  (16 - 18)  SpO2:  (96% - 100%)  Wt(kg): --  General: AAO x 3, appropriate mood and affect    Ophthalmology Exam:  Visual acuity (sc): 20/20 OD, 20/20 OS  Pupils: PERRL OU, no RAPD  Ttono: 16 OD 16 OS  Extraocular movements (EOMs): Full OU, no pain, no diplopia  Confrontational Visual Field (CVF): Full OD, Full OS  Color Plates: 12/12 OD, 12/12 OS    Pen Light Exam (PLE)  External: Flat OU  Lids/Lashes/Lacrimal Ducts: Flat OU    Sclera/Conjunctiva: W+Q OU  Cornea: Cl OU  Anterior Chamber: D+F OU    Iris: Flat OU  Lens: Cl OU    Fundus Exam: dilated with 1% tropicamide and 2.5% phenylephrine  Approval obtained from primary team for dilation  Patient aware that pupils can remained dilated for at least 4-6 hours  Exam performed with 20D lens    Vitreous: wnl OU  Disc, cup/disc: sharp and pink, 0.4 OU  Macula: Flat OU  Vessels: Normal caliber OU  Periphery: flat OU    Labs/Imaging:  < from: CT Head No Cont (12.07.23 @ 00:33) >  FINDINGS:  No acute intracranial hemorrhage, mass effect or midline shift.  No CT evidence of acute large vascular territory infarct.  The ventricles and cortical sulci are within normal limits.    The visualized paranasal sinuses and mastoid air cells are well aerated.  No displaced calvarial fracture.    IMPRESSION:  No acute intracranial hemorrhage or mass effect. No hydrocephalus.    < end of copied text >  < from: CT Venogram Brain w/ IV Cont (12.07.23 @ 11:29) >  IMPRESSION:    Head CT: No acute intracranial hemorrhage, mass effect, or shift of the   midline structures.    CT venogram head: No evidence of venous sinus thrombosis.    < end of copied text >   [facility] DEPARTMENT OF OPHTHALMOLOGY - INITIAL ADULT CONSULT  -----------------------------------------------------------------------------  Mary Reyes MD, PGY-3  Contact: TEAMS  -----------------------------------------------------------------------------    HPI: Patient ROBIN LÓPEZ is a 53y (1970) woman with a PMHx significant for CHF, HTN, HLD, DM, Idiopathic intracranial hypertension (diagnosed 2020), chronic back pain (intercostal neuralgia/complex regional pain syndrome) s/p spinal stimulator presenting as a transfer from [facility] for further evaluation of IIH.     Patient reporting worsening generalized headache (on the right side above ear, worse with standing and walking around) for the past 3 days and says she is having constant wooshing in her ears. Endorsing nausea. Also, having pain with eye movements, light sensitivity and blurry vision. Currently, on diamox 250 mg bid, no dosing change in the last 3 years. Has lost 150 lbs in the last 3 years. Saw Agnes Frank from neurology. Does not follow with neuro-optho. Denies focal weakness and focal numbness that is new. Says she has peripheral neuropathy in her LUE and LLE, which has been numb.     CTH at [facility] was negative for acute findings. Last MRI brain was done on 8/11/2023, which showed a partial empty sella with a mildly enlarged/remodeled pituitary fossa.     Interval History: Patient reports history of IIH diagnosed few years ago. She states she takes 500mg Diamox a day. Reports transient blacking out of vision in both eyes lasting about 30 seconds six times since 12/6/23. States she has had prior headaches and tinnitus, but this was the first time she had visual symptoms. Has not seen ophthalmologist since diagnosis of IIH.     PMH: As above.  POcHx: denies surg/laser  FH: denies glc/amd  Social History: denies etoh/tobacco  Ophthalmic Medications: none  Allergies: NKDA    Review of Systems:  Constitutional: No fever, chills  Eyes: No blurry vision, flashes, floaters, FBS, erythema, discharge, double vision, OU  Neuro: No tremors  Cardiovascular: No chest pain, palpitations  Respiratory: No SOB, no cough  GI: No nausea, vomiting, abdominal pain  : No dysuria  Skin: no rash  Psych: no depression  Endocrine: no polyuria, polydipsia  Heme/lymph: no swelling    VITALS: T(C): 36.8 (12-07-23 @ 12:47)  T(F): 98.2 (12-07-23 @ 12:47), Max: 98.2 (12-06-23 @ 19:46)  HR: 68 (12-07-23 @ 12:47) (68 - 76)  BP: 132/55 (12-07-23 @ 12:47) (127/69 - 160/84)  RR:  (16 - 18)  SpO2:  (96% - 100%)  Wt(kg): --  General: AAO x 3, appropriate mood and affect    Ophthalmology Exam:  Visual acuity (sc): 20/20 OD, 20/20 OS  Pupils: PERRL OU, no RAPD  Ttono: 20 OD 18 OS  Extraocular movements (EOMs): Full OU, no pain, no diplopia  Confrontational Visual Field (CVF): Full OD, Full OS  Color Plates: 12/12 OD, 12/12 OS    Pen Light Exam (PLE)  External: Flat OU  Lids/Lashes/Lacrimal Ducts: Flat OU    Sclera/Conjunctiva: W+Q OU  Cornea: Cl OU  Anterior Chamber: D+F OU    Iris: Flat OU  Lens: Cl OU    Fundus Exam: dilated with 1% tropicamide and 2.5% phenylephrine  Approval obtained from primary team for dilation  Patient aware that pupils can remained dilated for at least 4-6 hours  Exam performed with 20D lens    Vitreous: wnl OU  Disc, cup/disc: sharp and pink, 0.5 OU. No disc edema OU. No disc pallor OU. No [place]'s lines OU  Macula: Flat OU. No cherry red spot OU.  Vessels: Normal caliber OU  Periphery: Limited due to patient cooperation but appears flat OU. No retinal whitening OU.    Labs/Imaging:  < from: CT Head No Cont (12.07.23 @ 00:33) >  FINDINGS:  No acute intracranial hemorrhage, mass effect or midline shift.  No CT evidence of acute large vascular territory infarct.  The ventricles and cortical sulci are within normal limits.    The visualized paranasal sinuses and mastoid air cells are well aerated.  No displaced calvarial fracture.    IMPRESSION:  No acute intracranial hemorrhage or mass effect. No hydrocephalus.    < end of copied text >  < from: CT Venogram Brain w/ IV Cont (12.07.23 @ 11:29) >  IMPRESSION:    Head CT: No acute intracranial hemorrhage, mass effect, or shift of the   midline structures.    CT venogram head: No evidence of venous sinus thrombosis.    < end of copied text >   [facility] DEPARTMENT OF OPHTHALMOLOGY - INITIAL ADULT CONSULT  -----------------------------------------------------------------------------  Mary Reyes MD, PGY-3  Contact: TEAMS  -----------------------------------------------------------------------------    HPI: Patient ROBIN LÓPEZ is a 53y (1970) woman with a PMHx significant for CHF, HTN, HLD, DM, Idiopathic intracranial hypertension (diagnosed 2020), chronic back pain (intercostal neuralgia/complex regional pain syndrome) s/p spinal stimulator presenting as a transfer from [facility] for further evaluation of IIH.     Patient reporting worsening generalized headache (on the right side above ear, worse with standing and walking around) for the past 3 days and says she is having constant wooshing in her ears. Endorsing nausea. Also, having pain with eye movements, light sensitivity and blurry vision. Currently, on diamox 250 mg bid, no dosing change in the last 3 years. Has lost 150 lbs in the last 3 years. Saw Agnes Frank from neurology. Does not follow with neuro-optho. Denies focal weakness and focal numbness that is new. Says she has peripheral neuropathy in her LUE and LLE, which has been numb.     CTH at [facility] was negative for acute findings. Last MRI brain was done on 8/11/2023, which showed a partial empty sella with a mildly enlarged/remodeled pituitary fossa.     Interval History: Patient reports history of IIH diagnosed few years ago. She states she takes 500mg Diamox a day. Reports transient blacking out of vision in both eyes lasting about 30 seconds six times since 12/6/23. States she has had prior headaches and tinnitus, but this was the first time she had visual symptoms. Has not seen ophthalmologist since diagnosis of IIH.     PMH: As above.  POcHx: denies surg/laser  FH: denies glc/amd  Social History: denies etoh/tobacco  Ophthalmic Medications: none  Allergies: NKDA    Review of Systems:  Constitutional: No fever, chills  Eyes: No blurry vision, flashes, floaters, FBS, erythema, discharge, double vision, OU  Neuro: No tremors  Cardiovascular: No chest pain, palpitations  Respiratory: No SOB, no cough  GI: No nausea, vomiting, abdominal pain  : No dysuria  Skin: no rash  Psych: no depression  Endocrine: no polyuria, polydipsia  Heme/lymph: no swelling    VITALS: T(C): 36.8 (12-07-23 @ 12:47)  T(F): 98.2 (12-07-23 @ 12:47), Max: 98.2 (12-06-23 @ 19:46)  HR: 68 (12-07-23 @ 12:47) (68 - 76)  BP: 132/55 (12-07-23 @ 12:47) (127/69 - 160/84)  RR:  (16 - 18)  SpO2:  (96% - 100%)  Wt(kg): --  General: AAO x 3, appropriate mood and affect    Ophthalmology Exam:  Visual acuity (sc): 20/20 OD, 20/20 OS  Pupils: PERRL OU, no RAPD  Ttono: 20 OD 18 OS  Extraocular movements (EOMs): Full OU, no pain, no diplopia  Confrontational Visual Field (CVF): Full OD, Full OS  Color Plates: 12/12 OD, 12/12 OS    Pen Light Exam (PLE)  External: Flat OU  Lids/Lashes/Lacrimal Ducts: Flat OU    Sclera/Conjunctiva: W+Q OU  Cornea: Cl OU  Anterior Chamber: D+F OU    Iris: Flat OU  Lens: Cl OU    Fundus Exam: dilated with 1% tropicamide and 2.5% phenylephrine  Approval obtained from primary team for dilation  Patient aware that pupils can remained dilated for at least 4-6 hours  Exam performed with 20D lens    Vitreous: wnl OU  Disc, cup/disc: sharp and pink, 0.5 OU. No disc edema OU. No disc pallor OU. No [place]'s lines OU  Macula: Flat OU. No cherry red spot OU.  Vessels: Normal caliber OU  Periphery: Limited due to patient cooperation but appears flat OU. No retinal whitening OU.    Labs/Imaging:  < from: CT Head No Cont (12.07.23 @ 00:33) >  FINDINGS:  No acute intracranial hemorrhage, mass effect or midline shift.  No CT evidence of acute large vascular territory infarct.  The ventricles and cortical sulci are within normal limits.    The visualized paranasal sinuses and mastoid air cells are well aerated.  No displaced calvarial fracture.    IMPRESSION:  No acute intracranial hemorrhage or mass effect. No hydrocephalus.    < end of copied text >  < from: CT Venogram Brain w/ IV Cont (12.07.23 @ 11:29) >  IMPRESSION:    Head CT: No acute intracranial hemorrhage, mass effect, or shift of the   midline structures.    CT venogram head: No evidence of venous sinus thrombosis.    < end of copied text >

## 2023-12-07 NOTE — H&P ADULT - PROBLEM SELECTOR PLAN 2
Patient is euvolemic on exam   -Not in acute exacerbation   -C/w bumex 2 mg daily   -/w lisinopril 10 mg   -C/w spironolactone 25 mg daily   -Confirm carvedilol dose in the AM

## 2023-12-07 NOTE — ED PROVIDER NOTE - ATTENDING CONTRIBUTION TO CARE
53 year old transferred from [facility] for optho and neuro cx. patient initially presented with headache. concern for idiopathic intracranial HTN. LP attempted in ED but was ultimately unsuccessful.  Will obtain cbc to rule out anemia. Will obtain CMP to rule out electrolyte abnormalities, liver failure or renal failure. neuro cx optho cx. admit for LP 53 year old transferred from [facility] for optho and neuro cx. patient initially presented with headache. concern for idiopathic intracranial HTN. LP attempted in ED but was ultimately unsuccessful.  Will obtain cbc to rule out anemia. Will obtain CMP to rule out electrolyte abnormalities, liver failure or renal failure. neuro cx optho cx. admit for LP

## 2023-12-07 NOTE — CONSULT NOTE ADULT - ATTENDING COMMENTS
Exam:  Visual acuity with corrective lenses: OD 20/25; OS 20/20  Pupils: 3-->2, B.   VFF to CF.    A/P  Ms. Linares is a 52 yo woman with a h/o IIH with recurrent headache and visual changes.   Large volume LP in the past was therapeutic with long-lasting resolution of symptoms.   I agree with work up and management as above.   Thank you.

## 2023-12-07 NOTE — ED PROVIDER NOTE - PHYSICAL EXAMINATION
PHYSICAL EXAM:  CONSTITUTIONAL: Well appearing, awake, alert, oriented to person, place, time/situation and in no apparent distress.  HEAD: Atraumatic  EYES: Clear bilaterally, pupils equal, round and reactive to light.  ENMT: Airway patent, Nasal mucosa clear. Mouth with normal mucosa. Uvula is midline.   CARDIAC: Normal rate, regular rhythm. +S1/S2. No murmurs, rubs or gallops.  RESPIRATORY: Breathing unlabored. Breath sounds clear and equal bilaterally.  ABDOMEN:  Soft, nontender, nondistended. No rebound tenderness or guarding.  NEUROLOGICAL: Alert and oriented, no focal deficits, no motor or sensory deficits. CN2-12 intact. Sensation intact x4 extremities. pain with EOIM. L sided arm drift.   SKIN: Skin warm and dry. No evidence of rashes or lesions.

## 2023-12-07 NOTE — H&P ADULT - HISTORY OF PRESENT ILLNESS
52 yo F with Pmhx of CHF, HTN, HLD, DM, Idiopathic intracranial hypertension (diagnosed 2020), chronic back pain (intercostal neuralgia/complex regional pain syndrome) s/p spinal stimulator presents to the ED with worsening headache. Patient reports that her headache started 2 days ago and it is progressively getting worse. It is band-like in quality, radiates across her head, and constant. It worsens with movement. It also associated with photophobia, tinnitus, visual flashes, and blurry vision. She also hears a swooshing sound in her ears. She denies any recent fever, chills, neck stiffness, chest pain or abdominal pain. 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 She initially presented to [facility] and was transferred to [facility] for f/u with neurology and ophtho.   In the ED, her vitals were notable for HTN. Labs were notable for leukocytosis, hypokalemia, hyperglycemia. CT head showed no acute hemorrhage or mass effect. CT venogram showed no evidence of venous sinus thrombosis.  52 yo F with Pmhx of CHF, HTN, HLD, DM, Idiopathic intracranial hypertension (diagnosed 2020), chronic back pain (intercostal neuralgia/complex regional pain syndrome) s/p spinal stimulator presents to the ED with worsening headache. Patient reports that her headache started 2 days ago and it is progressively getting worse. It is band-like in quality, radiates across her head, and constant. It worsens with movement. It also associated with photophobia, tinnitus, visual flashes, and blurry vision. She also hears a swooshing sound in her ears. She denies any recent fever, chills, neck stiffness, chest pain or abdominal pain. 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 She initially presented to [facility] and was transferred to [facility] for f/u with neurology and ophtho.   In the ED, her vitals were notable for HTN. Labs were notable for leukocytosis, hypokalemia, hyperglycemia. CT head showed no acute hemorrhage or mass effect. CT venogram showed no evidence of venous sinus thrombosis.

## 2023-12-07 NOTE — H&P ADULT - PROBLEM SELECTOR PLAN 1
Patient reports to have band-like headache, tinnitus, blurry vision, and photophobia, consistent with her previous episode of idiopathic intracranial HTN /pseudotumor cerebri   -Neurology and ophtho consulted, recs appreciated   -ED attempted to perform LP 10X but was not successful. IR consulted, procedure ordered   -CT head and venogram is negative for any acute pathology   -C/w diamox PO 50 mg BID   -F/u with MRI brain, orbits w/wo contrast   -F/u with MRV w/ contrast

## 2023-12-07 NOTE — ED ADULT NURSE NOTE - CHIEF COMPLAINT QUOTE
Pt brought in by EMS from [place] for neuro consult and for Ophtho. PT denies chest pain, sob, n/v/d, fever or chills. DMII Pt brought in by EMS from [place] for neuro consult and for Ophtho. PT denies chest pain, sob, n/v/d, fever or chills. DMII

## 2023-12-08 LAB
B PERT DNA SPEC QL NAA+PROBE: SIGNIFICANT CHANGE UP
B PERT DNA SPEC QL NAA+PROBE: SIGNIFICANT CHANGE UP
B PERT+PARAPERT DNA PNL SPEC NAA+PROBE: SIGNIFICANT CHANGE UP
B PERT+PARAPERT DNA PNL SPEC NAA+PROBE: SIGNIFICANT CHANGE UP
BORDETELLA PARAPERTUSSIS (RAPRVP): SIGNIFICANT CHANGE UP
BORDETELLA PARAPERTUSSIS (RAPRVP): SIGNIFICANT CHANGE UP
C PNEUM DNA SPEC QL NAA+PROBE: SIGNIFICANT CHANGE UP
C PNEUM DNA SPEC QL NAA+PROBE: SIGNIFICANT CHANGE UP
FLUAV SUBTYP SPEC NAA+PROBE: SIGNIFICANT CHANGE UP
FLUAV SUBTYP SPEC NAA+PROBE: SIGNIFICANT CHANGE UP
FLUBV RNA SPEC QL NAA+PROBE: SIGNIFICANT CHANGE UP
FLUBV RNA SPEC QL NAA+PROBE: SIGNIFICANT CHANGE UP
GLUCOSE BLDC GLUCOMTR-MCNC: 108 MG/DL — HIGH (ref 70–99)
GLUCOSE BLDC GLUCOMTR-MCNC: 108 MG/DL — HIGH (ref 70–99)
GLUCOSE BLDC GLUCOMTR-MCNC: 136 MG/DL — HIGH (ref 70–99)
GLUCOSE BLDC GLUCOMTR-MCNC: 136 MG/DL — HIGH (ref 70–99)
GLUCOSE BLDC GLUCOMTR-MCNC: 155 MG/DL — HIGH (ref 70–99)
GLUCOSE BLDC GLUCOMTR-MCNC: 155 MG/DL — HIGH (ref 70–99)
GLUCOSE BLDC GLUCOMTR-MCNC: 175 MG/DL — HIGH (ref 70–99)
GLUCOSE BLDC GLUCOMTR-MCNC: 175 MG/DL — HIGH (ref 70–99)
HADV DNA SPEC QL NAA+PROBE: SIGNIFICANT CHANGE UP
HADV DNA SPEC QL NAA+PROBE: SIGNIFICANT CHANGE UP
HCOV 229E RNA SPEC QL NAA+PROBE: SIGNIFICANT CHANGE UP
HCOV 229E RNA SPEC QL NAA+PROBE: SIGNIFICANT CHANGE UP
HCOV HKU1 RNA SPEC QL NAA+PROBE: SIGNIFICANT CHANGE UP
HCOV HKU1 RNA SPEC QL NAA+PROBE: SIGNIFICANT CHANGE UP
HCOV NL63 RNA SPEC QL NAA+PROBE: SIGNIFICANT CHANGE UP
HCOV NL63 RNA SPEC QL NAA+PROBE: SIGNIFICANT CHANGE UP
HCOV OC43 RNA SPEC QL NAA+PROBE: SIGNIFICANT CHANGE UP
HCOV OC43 RNA SPEC QL NAA+PROBE: SIGNIFICANT CHANGE UP
HMPV RNA SPEC QL NAA+PROBE: SIGNIFICANT CHANGE UP
HMPV RNA SPEC QL NAA+PROBE: SIGNIFICANT CHANGE UP
HPIV1 RNA SPEC QL NAA+PROBE: SIGNIFICANT CHANGE UP
HPIV1 RNA SPEC QL NAA+PROBE: SIGNIFICANT CHANGE UP
HPIV2 RNA SPEC QL NAA+PROBE: SIGNIFICANT CHANGE UP
HPIV2 RNA SPEC QL NAA+PROBE: SIGNIFICANT CHANGE UP
HPIV3 RNA SPEC QL NAA+PROBE: SIGNIFICANT CHANGE UP
HPIV3 RNA SPEC QL NAA+PROBE: SIGNIFICANT CHANGE UP
HPIV4 RNA SPEC QL NAA+PROBE: SIGNIFICANT CHANGE UP
HPIV4 RNA SPEC QL NAA+PROBE: SIGNIFICANT CHANGE UP
M PNEUMO DNA SPEC QL NAA+PROBE: SIGNIFICANT CHANGE UP
M PNEUMO DNA SPEC QL NAA+PROBE: SIGNIFICANT CHANGE UP
RAPID RVP RESULT: SIGNIFICANT CHANGE UP
RAPID RVP RESULT: SIGNIFICANT CHANGE UP
RSV RNA SPEC QL NAA+PROBE: SIGNIFICANT CHANGE UP
RSV RNA SPEC QL NAA+PROBE: SIGNIFICANT CHANGE UP
RV+EV RNA SPEC QL NAA+PROBE: SIGNIFICANT CHANGE UP
RV+EV RNA SPEC QL NAA+PROBE: SIGNIFICANT CHANGE UP
SARS-COV-2 RNA SPEC QL NAA+PROBE: SIGNIFICANT CHANGE UP
SARS-COV-2 RNA SPEC QL NAA+PROBE: SIGNIFICANT CHANGE UP

## 2023-12-08 PROCEDURE — 99233 SBSQ HOSP IP/OBS HIGH 50: CPT

## 2023-12-08 RX ORDER — OXYCODONE HYDROCHLORIDE 5 MG/1
10 TABLET ORAL EVERY 6 HOURS
Refills: 0 | Status: DISCONTINUED | OUTPATIENT
Start: 2023-12-08 | End: 2023-12-15

## 2023-12-08 RX ORDER — POTASSIUM CHLORIDE 20 MEQ
40 PACKET (EA) ORAL ONCE
Refills: 0 | Status: COMPLETED | OUTPATIENT
Start: 2023-12-08 | End: 2023-12-08

## 2023-12-08 RX ORDER — SENNA PLUS 8.6 MG/1
1 TABLET ORAL
Qty: 0 | Refills: 0 | DISCHARGE

## 2023-12-08 RX ORDER — HYDROMORPHONE HYDROCHLORIDE 2 MG/ML
0.5 INJECTION INTRAMUSCULAR; INTRAVENOUS; SUBCUTANEOUS EVERY 8 HOURS
Refills: 0 | Status: DISCONTINUED | OUTPATIENT
Start: 2023-12-08 | End: 2023-12-15

## 2023-12-08 RX ORDER — HYDROMORPHONE HYDROCHLORIDE 2 MG/ML
0.5 INJECTION INTRAMUSCULAR; INTRAVENOUS; SUBCUTANEOUS ONCE
Refills: 0 | Status: DISCONTINUED | OUTPATIENT
Start: 2023-12-08 | End: 2023-12-08

## 2023-12-08 RX ADMIN — Medication 10 UNIT(S): at 13:02

## 2023-12-08 RX ADMIN — BUMETANIDE 2 MILLIGRAM(S): 0.25 INJECTION INTRAMUSCULAR; INTRAVENOUS at 06:16

## 2023-12-08 RX ADMIN — INSULIN GLARGINE 40 UNIT(S): 100 INJECTION, SOLUTION SUBCUTANEOUS at 00:03

## 2023-12-08 RX ADMIN — LISINOPRIL 10 MILLIGRAM(S): 2.5 TABLET ORAL at 06:16

## 2023-12-08 RX ADMIN — SPIRONOLACTONE 25 MILLIGRAM(S): 25 TABLET, FILM COATED ORAL at 06:15

## 2023-12-08 RX ADMIN — Medication 1: at 00:03

## 2023-12-08 RX ADMIN — Medication 1: at 13:02

## 2023-12-08 RX ADMIN — OXYCODONE HYDROCHLORIDE 10 MILLIGRAM(S): 5 TABLET ORAL at 02:00

## 2023-12-08 RX ADMIN — OXYCODONE HYDROCHLORIDE 10 MILLIGRAM(S): 5 TABLET ORAL at 00:04

## 2023-12-08 RX ADMIN — HYDROMORPHONE HYDROCHLORIDE 0.5 MILLIGRAM(S): 2 INJECTION INTRAMUSCULAR; INTRAVENOUS; SUBCUTANEOUS at 09:33

## 2023-12-08 RX ADMIN — HYDROMORPHONE HYDROCHLORIDE 0.5 MILLIGRAM(S): 2 INJECTION INTRAMUSCULAR; INTRAVENOUS; SUBCUTANEOUS at 03:57

## 2023-12-08 RX ADMIN — HYDROMORPHONE HYDROCHLORIDE 0.5 MILLIGRAM(S): 2 INJECTION INTRAMUSCULAR; INTRAVENOUS; SUBCUTANEOUS at 18:44

## 2023-12-08 RX ADMIN — NALOXEGOL OXALATE 25 MILLIGRAM(S): 12.5 TABLET, FILM COATED ORAL at 13:03

## 2023-12-08 RX ADMIN — OXYCODONE HYDROCHLORIDE 10 MILLIGRAM(S): 5 TABLET ORAL at 19:47

## 2023-12-08 RX ADMIN — OXYCODONE HYDROCHLORIDE 10 MILLIGRAM(S): 5 TABLET ORAL at 06:46

## 2023-12-08 RX ADMIN — HYDROMORPHONE HYDROCHLORIDE 0.5 MILLIGRAM(S): 2 INJECTION INTRAMUSCULAR; INTRAVENOUS; SUBCUTANEOUS at 09:03

## 2023-12-08 RX ADMIN — HYDROMORPHONE HYDROCHLORIDE 0.5 MILLIGRAM(S): 2 INJECTION INTRAMUSCULAR; INTRAVENOUS; SUBCUTANEOUS at 18:14

## 2023-12-08 RX ADMIN — Medication 40 MILLIEQUIVALENT(S): at 19:47

## 2023-12-08 RX ADMIN — Medication 10 UNIT(S): at 09:03

## 2023-12-08 RX ADMIN — OXYCODONE HYDROCHLORIDE 10 MILLIGRAM(S): 5 TABLET ORAL at 20:17

## 2023-12-08 RX ADMIN — OXYCODONE HYDROCHLORIDE 10 MILLIGRAM(S): 5 TABLET ORAL at 11:44

## 2023-12-08 RX ADMIN — Medication 10 UNIT(S): at 18:17

## 2023-12-08 RX ADMIN — Medication 1: at 09:04

## 2023-12-08 RX ADMIN — HYDROMORPHONE HYDROCHLORIDE 0.5 MILLIGRAM(S): 2 INJECTION INTRAMUSCULAR; INTRAVENOUS; SUBCUTANEOUS at 04:27

## 2023-12-08 RX ADMIN — OXYCODONE HYDROCHLORIDE 10 MILLIGRAM(S): 5 TABLET ORAL at 06:16

## 2023-12-08 RX ADMIN — INSULIN GLARGINE 40 UNIT(S): 100 INJECTION, SOLUTION SUBCUTANEOUS at 21:41

## 2023-12-08 RX ADMIN — ACETAZOLAMIDE 500 MILLIGRAM(S): 250 TABLET ORAL at 06:16

## 2023-12-08 RX ADMIN — OXYCODONE HYDROCHLORIDE 10 MILLIGRAM(S): 5 TABLET ORAL at 12:14

## 2023-12-08 NOTE — PHARMACOTHERAPY INTERVENTION NOTE - COMMENTS
Medication history is complete. Medication list updated in Outpatient Medication Record (OMR). Source: [facility], Patient.    Patient was recently prescribed oxycodone 20mg tablets twice a day, but as per patient has not started it yet. Medication history is complete. Medication list updated in Outpatient Medication Record (OMR). Source: [facility], Patient.    Patient was recently prescribed oxycodone 20mg tablets twice a day, but as per patient has not started it yet.

## 2023-12-08 NOTE — PATIENT PROFILE ADULT - TOBACCO USE
Patient calling back Hydrocodone  not sent to [facility]  Patient also needs alprazolam 0.5MG  Return call [phone]   Former smoker

## 2023-12-08 NOTE — PROGRESS NOTE ADULT - ASSESSMENT
54 yo F with Pmhx of CHF, HTN, HLD, DM, Idiopathic intracranial hypertension (diagnosed 2020), chronic back pain (intercostal neuralgia/complex regional pain syndrome) s/p spinal stimulator presents to the ED with worsening headache, most likely due to Pseudotumor cerebri 52 yo F with Pmhx of CHF, HTN, HLD, DM, Idiopathic intracranial hypertension (diagnosed 2020), chronic back pain (intercostal neuralgia/complex regional pain syndrome) s/p spinal stimulator presents to the ED with worsening headache, most likely due to Pseudotumor cerebri

## 2023-12-08 NOTE — PATIENT PROFILE ADULT - NSPROPOAURINARYCATHETER_GEN_A_NUR
no Lip Wedge Excision Repair Text: Given the location of the defect and the proximity to free margins a full thickness wedge repair was deemed most appropriate.  Using a sterile surgical marker, the appropriate repair was drawn incorporating the defect and placing the expected incisions perpendicular to the vermillion border.  The vermillion border was also meticulously outlined to ensure appropriate reapproximation during the repair.  The area thus outlined was incised through and through with a #15 scalpel blade.  The muscularis and dermis were reaproximated with deep sutures following hemostasis. Care was taken to realign the vermillion border before proceeding with the superficial closure.  Once the vermillion was realigned the superfical and mucosal closure was finished.

## 2023-12-08 NOTE — PROGRESS NOTE ADULT - PROBLEM SELECTOR PLAN 3
C/w lantus 40 untis and admelog 10 untis TID   -C/w ISS and FS   -F/u with HgbA1c -F/u with HgbA1c  -C/w lantus 40 units qhs and admelog 10 untis TID   -C/w ISS   -monitor fingersticks

## 2023-12-08 NOTE — CHART NOTE - NSCHARTNOTEFT_GEN_A_CORE
Pre-Interventional Radiology Procedure Note      Patient Age: 53    Patient Gender: Female    Procedure: Lumbar Puncture    Diagnosis/Indication: r/o Pseudotumor Cerebri    Interventional Radiology Attending Physician: ARMAND Guadalupe    Ordering Attending Physician: Dr. Cortes    Pertinent Medical History: CHF, DM HTn, Idiopathic Intracranial HTN (dx 2020), chronic back pain, s/p spinal stimulator    Pertinent labs:                      12.0   12.48 )-----------( 480      ( 06 Dec 2023 22:33 )             37.4       12-06    138  |  108  |  17  ----------------------------<  190<H>  3.3<L>   |  25  |  1.02    Ca    8.8      06 Dec 2023 22:33    TPro  8.0  /  Alb  3.3<L>  /  TBili  0.4  /  DBili  x   /  AST  12  /  ALT  31  /  AlkPhos  131<H>  12-06      PT/INR - ( 07 Dec 2023 13:11 )   PT: 10.4 sec;   INR: 0.92 ratio         PTT - ( 07 Dec 2023 13:11 )  PTT:27.7 sec        Patient and Family Aware ? Yes    Contact # Giacomo Nolan PA-C                     [facility] PA x [phone] Pre-Interventional Radiology Procedure Note      Patient Age: 53    Patient Gender: Female    Procedure: Lumbar Puncture    Diagnosis/Indication: r/o Pseudotumor Cerebri    Interventional Radiology Attending Physician: ARMAND Guadalupe    Ordering Attending Physician: Dr. Cortes    Pertinent Medical History: CHF, DM HTn, Idiopathic Intracranial HTN (dx 2020), chronic back pain, s/p spinal stimulator    Pertinent labs:                      12.0   12.48 )-----------( 480      ( 06 Dec 2023 22:33 )             37.4       12-06    138  |  108  |  17  ----------------------------<  190<H>  3.3<L>   |  25  |  1.02    Ca    8.8      06 Dec 2023 22:33    TPro  8.0  /  Alb  3.3<L>  /  TBili  0.4  /  DBili  x   /  AST  12  /  ALT  31  /  AlkPhos  131<H>  12-06      PT/INR - ( 07 Dec 2023 13:11 )   PT: 10.4 sec;   INR: 0.92 ratio         PTT - ( 07 Dec 2023 13:11 )  PTT:27.7 sec        Patient and Family Aware ? Yes    Contact # Giacomo Nolan PA-C                     [facility] PA x [phone]

## 2023-12-08 NOTE — PROGRESS NOTE ADULT - SUBJECTIVE AND OBJECTIVE BOX
PROGRESS NOTE:     Patient is a 53y old  Female who presents with a chief complaint of Headache, tinnitus (07 Dec 2023 16:59)      SUBJECTIVE / OVERNIGHT EVENTS: Pt still c/o headaches and visual changes. She is also having back pain from attempted LP's.     ADDITIONAL REVIEW OF SYSTEMS:    MEDICATIONS  (STANDING):  acetaZOLAMIDE    Tablet 500 milliGRAM(s) Oral two times a day  buMETAnide 2 milliGRAM(s) Oral daily  dextrose 5%. 1000 milliLiter(s) (100 mL/Hr) IV Continuous <Continuous>  dextrose 5%. 1000 milliLiter(s) (50 mL/Hr) IV Continuous <Continuous>  dextrose 50% Injectable 25 Gram(s) IV Push once  dextrose 50% Injectable 12.5 Gram(s) IV Push once  dextrose 50% Injectable 25 Gram(s) IV Push once  glucagon  Injectable 1 milliGRAM(s) IntraMuscular once  insulin glargine Injectable (LANTUS) 40 Unit(s) SubCutaneous at bedtime  insulin lispro (ADMELOG) corrective regimen sliding scale   SubCutaneous at bedtime  insulin lispro (ADMELOG) corrective regimen sliding scale   SubCutaneous three times a day before meals  insulin lispro Injectable (ADMELOG) 10 Unit(s) SubCutaneous three times a day before meals  lisinopril 10 milliGRAM(s) Oral daily  naloxegol 25 milliGRAM(s) Oral daily  spironolactone 25 milliGRAM(s) Oral daily    MEDICATIONS  (PRN):  acetaminophen     Tablet .. 650 milliGRAM(s) Oral every 6 hours PRN Temp greater or equal to 38C (100.4F), Mild Pain (1 - 3)  aluminum hydroxide/magnesium hydroxide/simethicone Suspension 30 milliLiter(s) Oral every 4 hours PRN Dyspepsia  baclofen 5 milliGRAM(s) Oral every 8 hours PRN Muscle Spasm  dextrose Oral Gel 15 Gram(s) Oral once PRN Blood Glucose LESS THAN 70 milliGRAM(s)/deciliter  HYDROmorphone  Injectable 0.5 milliGRAM(s) IV Push every 8 hours PRN Severe Pain (7 - 10)  melatonin 3 milliGRAM(s) Oral at bedtime PRN Insomnia  ondansetron Injectable 4 milliGRAM(s) IV Push every 8 hours PRN Nausea and/or Vomiting  oxyCODONE    IR 10 milliGRAM(s) Oral every 6 hours PRN Moderate Pain (4 - 6)      CAPILLARY BLOOD GLUCOSE      POCT Blood Glucose.: 155 mg/dL (08 Dec 2023 12:16)  POCT Blood Glucose.: 175 mg/dL (08 Dec 2023 08:55)  POCT Blood Glucose.: 298 mg/dL (07 Dec 2023 23:53)  POCT Blood Glucose.: 270 mg/dL (07 Dec 2023 16:45)    I&O's Summary      PHYSICAL EXAM:  Vital Signs Last 24 Hrs  T(C): 36.4 (08 Dec 2023 10:15), Max: 36.9 (07 Dec 2023 21:11)  T(F): 97.6 (08 Dec 2023 10:15), Max: 98.4 (07 Dec 2023 21:11)  HR: 53 (08 Dec 2023 10:15) (53 - 78)  BP: 100/59 (08 Dec 2023 10:15) (100/59 - 157/59)  BP(mean): --  RR: 18 (08 Dec 2023 10:15) (16 - 18)  SpO2: 99% (08 Dec 2023 10:15) (98% - 100%)    Parameters below as of 08 Dec 2023 10:15  Patient On (Oxygen Delivery Method): room air    GENERAL: Mild distress   HEENT:  EOMI, PERRLA, conjunctiva and sclera clear, oral mucosa moist, clear w/o any exudate   NECK: Supple, No JVD  CHEST/LUNG: Clear to auscultation bilaterally; No wheeze  HEART: Regular rate and rhythm; No murmurs, rubs, or gallops  ABDOMEN: Soft, Nontender, Nondistended; Bowel sounds present  EXTREMITIES:  2+ Peripheral Pulses, No clubbing, cyanosis, or edema  PSYCH: AAOx3, normal affect  NEUROLOGY: cranial nerve 2-12 grossly intact. strength 5/5 in UE and LE, sensation grossly intact   SKIN: No rashes or lesions    LABS:                        12.0   12.48 )-----------( 480      ( 06 Dec 2023 22:33 )             37.4     12-06    138  |  108  |  17  ----------------------------<  190<H>  3.3<L>   |  25  |  1.02    Ca    8.8      06 Dec 2023 22:33    TPro  8.0  /  Alb  3.3<L>  /  TBili  0.4  /  DBili  x   /  AST  12  /  ALT  31  /  AlkPhos  131<H>  12-06    PT/INR - ( 07 Dec 2023 13:11 )   PT: 10.4 sec;   INR: 0.92 ratio         PTT - ( 07 Dec 2023 13:11 )  PTT:27.7 sec      Urinalysis Basic - ( 06 Dec 2023 22:33 )    Color: x / Appearance: x / SG: x / pH: x  Gluc: 190 mg/dL / Ketone: x  / Bili: x / Urobili: x   Blood: x / Protein: x / Nitrite: x   Leuk Esterase: x / RBC: x / WBC x   Sq Epi: x / Non Sq Epi: x / Bacteria: x          RADIOLOGY & ADDITIONAL TESTS:  Results Reviewed:   Imaging Personally Reviewed:  Electrocardiogram Personally Reviewed:    COORDINATION OF CARE:  Care Discussed with Consultants/Other Providers [Y/N]:  Prior or Outpatient Records Reviewed [Y/N]:

## 2023-12-08 NOTE — PATIENT PROFILE ADULT - CAREGIVER ADDRESS
[id number] [facility] [id number]  [address] [facility] [address]

## 2023-12-08 NOTE — PROGRESS NOTE ADULT - PROBLEM SELECTOR PLAN 2
Patient is euvolemic on exam   -Not in acute exacerbation   -C/w bumex 2 mg daily   -/w lisinopril 10 mg   -C/w spironolactone 25 mg daily   -Confirm carvedilol dose in the AM Chronic HFpEF, euvolemic on exam   -Not in acute exacerbation   -c/w bumex 2 mg PO daily   -c/w lisinopril 10 mg   -c/w spironolactone 25 mg daily   -c/w carvedilol 3.125mg BID

## 2023-12-08 NOTE — PROGRESS NOTE ADULT - PROBLEM SELECTOR PLAN 4
-C/w lisinopril 10 mg daily   -C/w spironolactone 25 mg daily   -Confirm carvedilol dose in the AM -C/w lisinopril 10 mg daily   -C/w spironolactone 25 mg daily   -C/w carvedilol dose 3.125 mg BID  -monitor BP

## 2023-12-08 NOTE — PATIENT PROFILE ADULT - FALL HARM RISK - HARM RISK INTERVENTIONS
Assistance with ambulation/Assistance OOB with selected safe patient handling equipment/Communicate Risk of Fall with Harm to all staff/Discuss with provider need for PT consult/Monitor gait and stability/Provide patient with walking aids - walker, cane, crutches/Reinforce activity limits and safety measures with patient and family/Tailored Fall Risk Interventions/Visual Cue: Yellow wristband and red socks/Bed in lowest position, wheels locked, appropriate side rails in place/Call bell, personal items and telephone in reach/Instruct patient to call for assistance before getting out of bed or chair/Non-slip footwear when patient is out of bed/[place] to call system/Physically safe environment - no spills, clutter or unnecessary equipment/Purposeful Proactive Rounding/Room/bathroom lighting operational, light cord in reach Assistance with ambulation/Assistance OOB with selected safe patient handling equipment/Communicate Risk of Fall with Harm to all staff/Discuss with provider need for PT consult/Monitor gait and stability/Provide patient with walking aids - walker, cane, crutches/Reinforce activity limits and safety measures with patient and family/Tailored Fall Risk Interventions/Visual Cue: Yellow wristband and red socks/Bed in lowest position, wheels locked, appropriate side rails in place/Call bell, personal items and telephone in reach/Instruct patient to call for assistance before getting out of bed or chair/Non-slip footwear when patient is out of bed/[place] to call system/Physically safe environment - no spills, clutter or unnecessary equipment/Purposeful Proactive Rounding/Room/bathroom lighting operational, light cord in reach

## 2023-12-08 NOTE — PROGRESS NOTE ADULT - PROBLEM SELECTOR PLAN 1
Patient reports to have band-like headache, tinnitus, blurry vision, and photophobia, consistent with her previous episode of idiopathic intracranial HTN /pseudotumor cerebri   -Neurology and ophtho consulted, recs appreciated   -ED attempted to perform LP 10X but was not successful. IR consulted, procedure ordered   -CT head and venogram is negative for any acute pathology   -C/w diamox PO 50 mg BID   -F/u with MRI brain, orbits w/wo contrast   -F/u with MRV w/ contrast Patient reports to have band-like headache, tinnitus, blurry vision, and photophobia, consistent with her previous episode of idiopathic intracranial HTN /pseudotumor cerebri   -Neurology and ophtho consulted, recs appreciated   -ED attempted to perform LP 10X but was not successful  -IR consulted, needs LP w/ OP  -CT head and venogram is negative for any acute pathology   -C/w Diamox PO 50 mg BID   -F/u with MRI brain, orbits w/wo contrast   -F/u with MRV w/ contrast

## 2023-12-09 LAB
A1C WITH ESTIMATED AVERAGE GLUCOSE RESULT: 7.5 % — HIGH (ref 4–5.6)
A1C WITH ESTIMATED AVERAGE GLUCOSE RESULT: 7.5 % — HIGH (ref 4–5.6)
ANION GAP SERPL CALC-SCNC: 11 MMOL/L — SIGNIFICANT CHANGE UP (ref 7–14)
ANION GAP SERPL CALC-SCNC: 11 MMOL/L — SIGNIFICANT CHANGE UP (ref 7–14)
BASOPHILS # BLD AUTO: 0.03 K/UL — SIGNIFICANT CHANGE UP (ref 0–0.2)
BASOPHILS # BLD AUTO: 0.03 K/UL — SIGNIFICANT CHANGE UP (ref 0–0.2)
BASOPHILS NFR BLD AUTO: 0.3 % — SIGNIFICANT CHANGE UP (ref 0–2)
BASOPHILS NFR BLD AUTO: 0.3 % — SIGNIFICANT CHANGE UP (ref 0–2)
BUN SERPL-MCNC: 15 MG/DL — SIGNIFICANT CHANGE UP (ref 7–23)
BUN SERPL-MCNC: 15 MG/DL — SIGNIFICANT CHANGE UP (ref 7–23)
CALCIUM SERPL-MCNC: 9.1 MG/DL — SIGNIFICANT CHANGE UP (ref 8.4–10.5)
CALCIUM SERPL-MCNC: 9.1 MG/DL — SIGNIFICANT CHANGE UP (ref 8.4–10.5)
CHLORIDE SERPL-SCNC: 106 MMOL/L — SIGNIFICANT CHANGE UP (ref 98–107)
CHLORIDE SERPL-SCNC: 106 MMOL/L — SIGNIFICANT CHANGE UP (ref 98–107)
CO2 SERPL-SCNC: 22 MMOL/L — SIGNIFICANT CHANGE UP (ref 22–31)
CO2 SERPL-SCNC: 22 MMOL/L — SIGNIFICANT CHANGE UP (ref 22–31)
CREAT SERPL-MCNC: 0.86 MG/DL — SIGNIFICANT CHANGE UP (ref 0.5–1.3)
CREAT SERPL-MCNC: 0.86 MG/DL — SIGNIFICANT CHANGE UP (ref 0.5–1.3)
EGFR: 81 ML/MIN/1.73M2 — SIGNIFICANT CHANGE UP
EGFR: 81 ML/MIN/1.73M2 — SIGNIFICANT CHANGE UP
EOSINOPHIL # BLD AUTO: 0.26 K/UL — SIGNIFICANT CHANGE UP (ref 0–0.5)
EOSINOPHIL # BLD AUTO: 0.26 K/UL — SIGNIFICANT CHANGE UP (ref 0–0.5)
EOSINOPHIL NFR BLD AUTO: 2.6 % — SIGNIFICANT CHANGE UP (ref 0–6)
EOSINOPHIL NFR BLD AUTO: 2.6 % — SIGNIFICANT CHANGE UP (ref 0–6)
ESTIMATED AVERAGE GLUCOSE: 169 — SIGNIFICANT CHANGE UP
ESTIMATED AVERAGE GLUCOSE: 169 — SIGNIFICANT CHANGE UP
GLUCOSE BLDC GLUCOMTR-MCNC: 127 MG/DL — HIGH (ref 70–99)
GLUCOSE BLDC GLUCOMTR-MCNC: 127 MG/DL — HIGH (ref 70–99)
GLUCOSE BLDC GLUCOMTR-MCNC: 167 MG/DL — HIGH (ref 70–99)
GLUCOSE BLDC GLUCOMTR-MCNC: 167 MG/DL — HIGH (ref 70–99)
GLUCOSE BLDC GLUCOMTR-MCNC: 171 MG/DL — HIGH (ref 70–99)
GLUCOSE BLDC GLUCOMTR-MCNC: 171 MG/DL — HIGH (ref 70–99)
GLUCOSE BLDC GLUCOMTR-MCNC: 176 MG/DL — HIGH (ref 70–99)
GLUCOSE BLDC GLUCOMTR-MCNC: 176 MG/DL — HIGH (ref 70–99)
GLUCOSE SERPL-MCNC: 130 MG/DL — HIGH (ref 70–99)
GLUCOSE SERPL-MCNC: 130 MG/DL — HIGH (ref 70–99)
HCT VFR BLD CALC: 36 % — SIGNIFICANT CHANGE UP (ref 34.5–45)
HCT VFR BLD CALC: 36 % — SIGNIFICANT CHANGE UP (ref 34.5–45)
HGB BLD-MCNC: 11.6 G/DL — SIGNIFICANT CHANGE UP (ref 11.5–15.5)
HGB BLD-MCNC: 11.6 G/DL — SIGNIFICANT CHANGE UP (ref 11.5–15.5)
IANC: 5.48 K/UL — SIGNIFICANT CHANGE UP (ref 1.8–7.4)
IANC: 5.48 K/UL — SIGNIFICANT CHANGE UP (ref 1.8–7.4)
IMM GRANULOCYTES NFR BLD AUTO: 0.2 % — SIGNIFICANT CHANGE UP (ref 0–0.9)
IMM GRANULOCYTES NFR BLD AUTO: 0.2 % — SIGNIFICANT CHANGE UP (ref 0–0.9)
LYMPHOCYTES # BLD AUTO: 3.53 K/UL — HIGH (ref 1–3.3)
LYMPHOCYTES # BLD AUTO: 3.53 K/UL — HIGH (ref 1–3.3)
LYMPHOCYTES # BLD AUTO: 35.9 % — SIGNIFICANT CHANGE UP (ref 13–44)
LYMPHOCYTES # BLD AUTO: 35.9 % — SIGNIFICANT CHANGE UP (ref 13–44)
MAGNESIUM SERPL-MCNC: 1.8 MG/DL — SIGNIFICANT CHANGE UP (ref 1.6–2.6)
MAGNESIUM SERPL-MCNC: 1.8 MG/DL — SIGNIFICANT CHANGE UP (ref 1.6–2.6)
MCHC RBC-ENTMCNC: 29.8 PG — SIGNIFICANT CHANGE UP (ref 27–34)
MCHC RBC-ENTMCNC: 29.8 PG — SIGNIFICANT CHANGE UP (ref 27–34)
MCHC RBC-ENTMCNC: 32.2 GM/DL — SIGNIFICANT CHANGE UP (ref 32–36)
MCHC RBC-ENTMCNC: 32.2 GM/DL — SIGNIFICANT CHANGE UP (ref 32–36)
MCV RBC AUTO: 92.5 FL — SIGNIFICANT CHANGE UP (ref 80–100)
MCV RBC AUTO: 92.5 FL — SIGNIFICANT CHANGE UP (ref 80–100)
MONOCYTES # BLD AUTO: 0.52 K/UL — SIGNIFICANT CHANGE UP (ref 0–0.9)
MONOCYTES # BLD AUTO: 0.52 K/UL — SIGNIFICANT CHANGE UP (ref 0–0.9)
MONOCYTES NFR BLD AUTO: 5.3 % — SIGNIFICANT CHANGE UP (ref 2–14)
MONOCYTES NFR BLD AUTO: 5.3 % — SIGNIFICANT CHANGE UP (ref 2–14)
NEUTROPHILS # BLD AUTO: 5.48 K/UL — SIGNIFICANT CHANGE UP (ref 1.8–7.4)
NEUTROPHILS # BLD AUTO: 5.48 K/UL — SIGNIFICANT CHANGE UP (ref 1.8–7.4)
NEUTROPHILS NFR BLD AUTO: 55.7 % — SIGNIFICANT CHANGE UP (ref 43–77)
NEUTROPHILS NFR BLD AUTO: 55.7 % — SIGNIFICANT CHANGE UP (ref 43–77)
NRBC # BLD: 0 /100 WBCS — SIGNIFICANT CHANGE UP (ref 0–0)
NRBC # BLD: 0 /100 WBCS — SIGNIFICANT CHANGE UP (ref 0–0)
NRBC # FLD: 0 K/UL — SIGNIFICANT CHANGE UP (ref 0–0)
NRBC # FLD: 0 K/UL — SIGNIFICANT CHANGE UP (ref 0–0)
PHOSPHATE SERPL-MCNC: 3.1 MG/DL — SIGNIFICANT CHANGE UP (ref 2.5–4.5)
PHOSPHATE SERPL-MCNC: 3.1 MG/DL — SIGNIFICANT CHANGE UP (ref 2.5–4.5)
PLATELET # BLD AUTO: 464 K/UL — HIGH (ref 150–400)
PLATELET # BLD AUTO: 464 K/UL — HIGH (ref 150–400)
POTASSIUM SERPL-MCNC: 3.6 MMOL/L — SIGNIFICANT CHANGE UP (ref 3.5–5.3)
POTASSIUM SERPL-MCNC: 3.6 MMOL/L — SIGNIFICANT CHANGE UP (ref 3.5–5.3)
POTASSIUM SERPL-SCNC: 3.6 MMOL/L — SIGNIFICANT CHANGE UP (ref 3.5–5.3)
POTASSIUM SERPL-SCNC: 3.6 MMOL/L — SIGNIFICANT CHANGE UP (ref 3.5–5.3)
RBC # BLD: 3.89 M/UL — SIGNIFICANT CHANGE UP (ref 3.8–5.2)
RBC # BLD: 3.89 M/UL — SIGNIFICANT CHANGE UP (ref 3.8–5.2)
RBC # FLD: 13.6 % — SIGNIFICANT CHANGE UP (ref 10.3–14.5)
RBC # FLD: 13.6 % — SIGNIFICANT CHANGE UP (ref 10.3–14.5)
SODIUM SERPL-SCNC: 139 MMOL/L — SIGNIFICANT CHANGE UP (ref 135–145)
SODIUM SERPL-SCNC: 139 MMOL/L — SIGNIFICANT CHANGE UP (ref 135–145)
WBC # BLD: 9.84 K/UL — SIGNIFICANT CHANGE UP (ref 3.8–10.5)
WBC # BLD: 9.84 K/UL — SIGNIFICANT CHANGE UP (ref 3.8–10.5)
WBC # FLD AUTO: 9.84 K/UL — SIGNIFICANT CHANGE UP (ref 3.8–10.5)
WBC # FLD AUTO: 9.84 K/UL — SIGNIFICANT CHANGE UP (ref 3.8–10.5)

## 2023-12-09 PROCEDURE — 99233 SBSQ HOSP IP/OBS HIGH 50: CPT

## 2023-12-09 RX ADMIN — OXYCODONE HYDROCHLORIDE 10 MILLIGRAM(S): 5 TABLET ORAL at 11:38

## 2023-12-09 RX ADMIN — BUMETANIDE 2 MILLIGRAM(S): 0.25 INJECTION INTRAMUSCULAR; INTRAVENOUS at 05:25

## 2023-12-09 RX ADMIN — HYDROMORPHONE HYDROCHLORIDE 0.5 MILLIGRAM(S): 2 INJECTION INTRAMUSCULAR; INTRAVENOUS; SUBCUTANEOUS at 14:52

## 2023-12-09 RX ADMIN — OXYCODONE HYDROCHLORIDE 10 MILLIGRAM(S): 5 TABLET ORAL at 18:15

## 2023-12-09 RX ADMIN — Medication 5 MILLIGRAM(S): at 09:48

## 2023-12-09 RX ADMIN — Medication 1: at 13:06

## 2023-12-09 RX ADMIN — OXYCODONE HYDROCHLORIDE 10 MILLIGRAM(S): 5 TABLET ORAL at 18:45

## 2023-12-09 RX ADMIN — LISINOPRIL 10 MILLIGRAM(S): 2.5 TABLET ORAL at 05:24

## 2023-12-09 RX ADMIN — OXYCODONE HYDROCHLORIDE 10 MILLIGRAM(S): 5 TABLET ORAL at 05:24

## 2023-12-09 RX ADMIN — Medication 650 MILLIGRAM(S): at 09:50

## 2023-12-09 RX ADMIN — ACETAZOLAMIDE 500 MILLIGRAM(S): 250 TABLET ORAL at 18:16

## 2023-12-09 RX ADMIN — OXYCODONE HYDROCHLORIDE 10 MILLIGRAM(S): 5 TABLET ORAL at 12:08

## 2023-12-09 RX ADMIN — INSULIN GLARGINE 40 UNIT(S): 100 INJECTION, SOLUTION SUBCUTANEOUS at 21:46

## 2023-12-09 RX ADMIN — Medication 10 UNIT(S): at 09:49

## 2023-12-09 RX ADMIN — ACETAZOLAMIDE 500 MILLIGRAM(S): 250 TABLET ORAL at 05:26

## 2023-12-09 RX ADMIN — OXYCODONE HYDROCHLORIDE 10 MILLIGRAM(S): 5 TABLET ORAL at 06:38

## 2023-12-09 RX ADMIN — HYDROMORPHONE HYDROCHLORIDE 0.5 MILLIGRAM(S): 2 INJECTION INTRAMUSCULAR; INTRAVENOUS; SUBCUTANEOUS at 04:09

## 2023-12-09 RX ADMIN — Medication 1: at 18:17

## 2023-12-09 RX ADMIN — NALOXEGOL OXALATE 25 MILLIGRAM(S): 12.5 TABLET, FILM COATED ORAL at 11:38

## 2023-12-09 RX ADMIN — HYDROMORPHONE HYDROCHLORIDE 0.5 MILLIGRAM(S): 2 INJECTION INTRAMUSCULAR; INTRAVENOUS; SUBCUTANEOUS at 05:00

## 2023-12-09 RX ADMIN — Medication 650 MILLIGRAM(S): at 10:20

## 2023-12-09 RX ADMIN — Medication 10 UNIT(S): at 18:17

## 2023-12-09 RX ADMIN — SPIRONOLACTONE 25 MILLIGRAM(S): 25 TABLET, FILM COATED ORAL at 05:26

## 2023-12-09 RX ADMIN — Medication 10 UNIT(S): at 13:06

## 2023-12-09 RX ADMIN — HYDROMORPHONE HYDROCHLORIDE 0.5 MILLIGRAM(S): 2 INJECTION INTRAMUSCULAR; INTRAVENOUS; SUBCUTANEOUS at 14:22

## 2023-12-09 NOTE — PROGRESS NOTE ADULT - ASSESSMENT
54 yo F with Pmhx of CHF, HTN, HLD, DM, Idiopathic intracranial hypertension (diagnosed 2020), chronic back pain (intercostal neuralgia/complex regional pain syndrome) s/p spinal stimulator presents to the ED with worsening headache, most likely due to Pseudotumor cerebri

## 2023-12-09 NOTE — PROGRESS NOTE ADULT - PROBLEM SELECTOR PLAN 1
Patient reports to have band-like headache, tinnitus, blurry vision, and photophobia, consistent with her previous episode of idiopathic intracranial HTN /pseudotumor cerebri   -Neurology and ophtho consulted, recs appreciated   -ED attempted to perform LP 10X but was not successful, patient refusing further attempts   -Plan for LP by neuroradiology on Monday   -CT head and venogram is negative for any acute pathology   -C/w Diamox  mg BID   -F/u with MRI brain, orbits w/wo contrast   -F/u with MRV w/ contrast  -Pain control

## 2023-12-09 NOTE — PROGRESS NOTE ADULT - SUBJECTIVE AND OBJECTIVE BOX
PROGRESS NOTE:     Patient is a 53y old  Female who presents with a chief complaint of Headache, tinnitus (08 Dec 2023 12:22)      SUBJECTIVE / OVERNIGHT EVENTS: Pt still c/o headaches and back soreness.     ADDITIONAL REVIEW OF SYSTEMS:    MEDICATIONS  (STANDING):  acetaZOLAMIDE    Tablet 500 milliGRAM(s) Oral two times a day  buMETAnide 2 milliGRAM(s) Oral daily  dextrose 5%. 1000 milliLiter(s) (100 mL/Hr) IV Continuous <Continuous>  dextrose 5%. 1000 milliLiter(s) (50 mL/Hr) IV Continuous <Continuous>  dextrose 50% Injectable 25 Gram(s) IV Push once  dextrose 50% Injectable 25 Gram(s) IV Push once  dextrose 50% Injectable 12.5 Gram(s) IV Push once  glucagon  Injectable 1 milliGRAM(s) IntraMuscular once  insulin glargine Injectable (LANTUS) 40 Unit(s) SubCutaneous at bedtime  insulin lispro (ADMELOG) corrective regimen sliding scale   SubCutaneous at bedtime  insulin lispro (ADMELOG) corrective regimen sliding scale   SubCutaneous three times a day before meals  insulin lispro Injectable (ADMELOG) 10 Unit(s) SubCutaneous three times a day before meals  lisinopril 10 milliGRAM(s) Oral daily  naloxegol 25 milliGRAM(s) Oral daily  spironolactone 25 milliGRAM(s) Oral daily    MEDICATIONS  (PRN):  acetaminophen     Tablet .. 650 milliGRAM(s) Oral every 6 hours PRN Temp greater or equal to 38C (100.4F), Mild Pain (1 - 3)  aluminum hydroxide/magnesium hydroxide/simethicone Suspension 30 milliLiter(s) Oral every 4 hours PRN Dyspepsia  baclofen 5 milliGRAM(s) Oral every 8 hours PRN Muscle Spasm  dextrose Oral Gel 15 Gram(s) Oral once PRN Blood Glucose LESS THAN 70 milliGRAM(s)/deciliter  HYDROmorphone  Injectable 0.5 milliGRAM(s) IV Push every 8 hours PRN Severe Pain (7 - 10)  melatonin 3 milliGRAM(s) Oral at bedtime PRN Insomnia  ondansetron Injectable 4 milliGRAM(s) IV Push every 8 hours PRN Nausea and/or Vomiting  oxyCODONE    IR 10 milliGRAM(s) Oral every 6 hours PRN Moderate Pain (4 - 6)      CAPILLARY BLOOD GLUCOSE      POCT Blood Glucose.: 171 mg/dL (09 Dec 2023 12:11)  POCT Blood Glucose.: 127 mg/dL (09 Dec 2023 09:09)  POCT Blood Glucose.: 136 mg/dL (08 Dec 2023 21:35)  POCT Blood Glucose.: 108 mg/dL (08 Dec 2023 18:13)    I&O's Summary    08 Dec 2023 07:01  -  09 Dec 2023 07:00  --------------------------------------------------------  IN: 720 mL / OUT: 5000 mL / NET: -4280 mL        PHYSICAL EXAM:  Vital Signs Last 24 Hrs  T(C): 36.3 (09 Dec 2023 10:39), Max: 36.9 (08 Dec 2023 21:49)  T(F): 97.3 (09 Dec 2023 10:39), Max: 98.5 (08 Dec 2023 21:49)  HR: 60 (09 Dec 2023 10:39) (60 - 74)  BP: 100/47 (09 Dec 2023 10:39) (100/47 - 118/55)  BP(mean): --  RR: 18 (09 Dec 2023 10:39) (18 - 18)  SpO2: 98% (09 Dec 2023 10:39) (98% - 100%)    Parameters below as of 09 Dec 2023 10:39  Patient On (Oxygen Delivery Method): room air      GENERAL: Mild distress   HEENT:  EOMI, PERRLA, conjunctiva and sclera clear, oral mucosa moist, clear w/o any exudate   NECK: Supple, No JVD  CHEST/LUNG: Clear to auscultation bilaterally; No wheeze  HEART: Regular rate and rhythm; No murmurs, rubs, or gallops  ABDOMEN: Soft, Nontender, Nondistended; Bowel sounds present  EXTREMITIES:  2+ Peripheral Pulses, No clubbing, cyanosis, or edema  PSYCH: AAOx3, normal affect  NEUROLOGY: cranial nerve 2-12 grossly intact. strength 5/5 in UE and LE, sensation grossly intact   SKIN: No rashes or lesions      LABS:                        11.6   9.84  )-----------( 464      ( 09 Dec 2023 05:12 )             36.0     12-09    139  |  106  |  15  ----------------------------<  130<H>  3.6   |  22  |  0.86    Ca    9.1      09 Dec 2023 05:12  Phos  3.1     12-09  Mg     1.80     12-09            Urinalysis Basic - ( 09 Dec 2023 05:12 )    Color: x / Appearance: x / SG: x / pH: x  Gluc: 130 mg/dL / Ketone: x  / Bili: x / Urobili: x   Blood: x / Protein: x / Nitrite: x   Leuk Esterase: x / RBC: x / WBC x   Sq Epi: x / Non Sq Epi: x / Bacteria: x          RADIOLOGY & ADDITIONAL TESTS:  Results Reviewed:   Imaging Personally Reviewed:  Electrocardiogram Personally Reviewed:    COORDINATION OF CARE:  Care Discussed with Consultants/Other Providers [Y/N]:  Prior or Outpatient Records Reviewed [Y/N]:

## 2023-12-09 NOTE — PROGRESS NOTE ADULT - PROBLEM SELECTOR PLAN 4
-C/w lisinopril 10 mg daily   -C/w spironolactone 25 mg daily   -C/w carvedilol dose 3.125 mg BID  -monitor BP

## 2023-12-09 NOTE — PROGRESS NOTE ADULT - PROBLEM SELECTOR PLAN 2
Chronic HFpEF, euvolemic on exam   -Not in acute exacerbation   -c/w bumex 2 mg PO daily   -c/w lisinopril 10 mg   -c/w spironolactone 25 mg daily   -c/w carvedilol 3.125mg BID

## 2023-12-10 LAB
ANION GAP SERPL CALC-SCNC: 12 MMOL/L — SIGNIFICANT CHANGE UP (ref 7–14)
ANION GAP SERPL CALC-SCNC: 12 MMOL/L — SIGNIFICANT CHANGE UP (ref 7–14)
BASOPHILS # BLD AUTO: 0.03 K/UL — SIGNIFICANT CHANGE UP (ref 0–0.2)
BASOPHILS # BLD AUTO: 0.03 K/UL — SIGNIFICANT CHANGE UP (ref 0–0.2)
BASOPHILS NFR BLD AUTO: 0.3 % — SIGNIFICANT CHANGE UP (ref 0–2)
BASOPHILS NFR BLD AUTO: 0.3 % — SIGNIFICANT CHANGE UP (ref 0–2)
BUN SERPL-MCNC: 16 MG/DL — SIGNIFICANT CHANGE UP (ref 7–23)
BUN SERPL-MCNC: 16 MG/DL — SIGNIFICANT CHANGE UP (ref 7–23)
CALCIUM SERPL-MCNC: 9.1 MG/DL — SIGNIFICANT CHANGE UP (ref 8.4–10.5)
CALCIUM SERPL-MCNC: 9.1 MG/DL — SIGNIFICANT CHANGE UP (ref 8.4–10.5)
CHLORIDE SERPL-SCNC: 105 MMOL/L — SIGNIFICANT CHANGE UP (ref 98–107)
CHLORIDE SERPL-SCNC: 105 MMOL/L — SIGNIFICANT CHANGE UP (ref 98–107)
CO2 SERPL-SCNC: 20 MMOL/L — LOW (ref 22–31)
CO2 SERPL-SCNC: 20 MMOL/L — LOW (ref 22–31)
CREAT SERPL-MCNC: 0.88 MG/DL — SIGNIFICANT CHANGE UP (ref 0.5–1.3)
CREAT SERPL-MCNC: 0.88 MG/DL — SIGNIFICANT CHANGE UP (ref 0.5–1.3)
EGFR: 79 ML/MIN/1.73M2 — SIGNIFICANT CHANGE UP
EGFR: 79 ML/MIN/1.73M2 — SIGNIFICANT CHANGE UP
EOSINOPHIL # BLD AUTO: 0.26 K/UL — SIGNIFICANT CHANGE UP (ref 0–0.5)
EOSINOPHIL # BLD AUTO: 0.26 K/UL — SIGNIFICANT CHANGE UP (ref 0–0.5)
EOSINOPHIL NFR BLD AUTO: 2.7 % — SIGNIFICANT CHANGE UP (ref 0–6)
EOSINOPHIL NFR BLD AUTO: 2.7 % — SIGNIFICANT CHANGE UP (ref 0–6)
GLUCOSE BLDC GLUCOMTR-MCNC: 184 MG/DL — HIGH (ref 70–99)
GLUCOSE BLDC GLUCOMTR-MCNC: 184 MG/DL — HIGH (ref 70–99)
GLUCOSE BLDC GLUCOMTR-MCNC: 195 MG/DL — HIGH (ref 70–99)
GLUCOSE BLDC GLUCOMTR-MCNC: 195 MG/DL — HIGH (ref 70–99)
GLUCOSE BLDC GLUCOMTR-MCNC: 219 MG/DL — HIGH (ref 70–99)
GLUCOSE BLDC GLUCOMTR-MCNC: 219 MG/DL — HIGH (ref 70–99)
GLUCOSE BLDC GLUCOMTR-MCNC: 315 MG/DL — HIGH (ref 70–99)
GLUCOSE BLDC GLUCOMTR-MCNC: 315 MG/DL — HIGH (ref 70–99)
GLUCOSE SERPL-MCNC: 176 MG/DL — HIGH (ref 70–99)
GLUCOSE SERPL-MCNC: 176 MG/DL — HIGH (ref 70–99)
HCT VFR BLD CALC: 36.7 % — SIGNIFICANT CHANGE UP (ref 34.5–45)
HCT VFR BLD CALC: 36.7 % — SIGNIFICANT CHANGE UP (ref 34.5–45)
HGB BLD-MCNC: 12 G/DL — SIGNIFICANT CHANGE UP (ref 11.5–15.5)
HGB BLD-MCNC: 12 G/DL — SIGNIFICANT CHANGE UP (ref 11.5–15.5)
IANC: 5.57 K/UL — SIGNIFICANT CHANGE UP (ref 1.8–7.4)
IANC: 5.57 K/UL — SIGNIFICANT CHANGE UP (ref 1.8–7.4)
IMM GRANULOCYTES NFR BLD AUTO: 0.3 % — SIGNIFICANT CHANGE UP (ref 0–0.9)
IMM GRANULOCYTES NFR BLD AUTO: 0.3 % — SIGNIFICANT CHANGE UP (ref 0–0.9)
LYMPHOCYTES # BLD AUTO: 3.23 K/UL — SIGNIFICANT CHANGE UP (ref 1–3.3)
LYMPHOCYTES # BLD AUTO: 3.23 K/UL — SIGNIFICANT CHANGE UP (ref 1–3.3)
LYMPHOCYTES # BLD AUTO: 33.4 % — SIGNIFICANT CHANGE UP (ref 13–44)
LYMPHOCYTES # BLD AUTO: 33.4 % — SIGNIFICANT CHANGE UP (ref 13–44)
MAGNESIUM SERPL-MCNC: 1.7 MG/DL — SIGNIFICANT CHANGE UP (ref 1.6–2.6)
MAGNESIUM SERPL-MCNC: 1.7 MG/DL — SIGNIFICANT CHANGE UP (ref 1.6–2.6)
MCHC RBC-ENTMCNC: 29.2 PG — SIGNIFICANT CHANGE UP (ref 27–34)
MCHC RBC-ENTMCNC: 29.2 PG — SIGNIFICANT CHANGE UP (ref 27–34)
MCHC RBC-ENTMCNC: 32.7 GM/DL — SIGNIFICANT CHANGE UP (ref 32–36)
MCHC RBC-ENTMCNC: 32.7 GM/DL — SIGNIFICANT CHANGE UP (ref 32–36)
MCV RBC AUTO: 89.3 FL — SIGNIFICANT CHANGE UP (ref 80–100)
MCV RBC AUTO: 89.3 FL — SIGNIFICANT CHANGE UP (ref 80–100)
MONOCYTES # BLD AUTO: 0.54 K/UL — SIGNIFICANT CHANGE UP (ref 0–0.9)
MONOCYTES # BLD AUTO: 0.54 K/UL — SIGNIFICANT CHANGE UP (ref 0–0.9)
MONOCYTES NFR BLD AUTO: 5.6 % — SIGNIFICANT CHANGE UP (ref 2–14)
MONOCYTES NFR BLD AUTO: 5.6 % — SIGNIFICANT CHANGE UP (ref 2–14)
NEUTROPHILS # BLD AUTO: 5.57 K/UL — SIGNIFICANT CHANGE UP (ref 1.8–7.4)
NEUTROPHILS # BLD AUTO: 5.57 K/UL — SIGNIFICANT CHANGE UP (ref 1.8–7.4)
NEUTROPHILS NFR BLD AUTO: 57.7 % — SIGNIFICANT CHANGE UP (ref 43–77)
NEUTROPHILS NFR BLD AUTO: 57.7 % — SIGNIFICANT CHANGE UP (ref 43–77)
NRBC # BLD: 0 /100 WBCS — SIGNIFICANT CHANGE UP (ref 0–0)
NRBC # BLD: 0 /100 WBCS — SIGNIFICANT CHANGE UP (ref 0–0)
NRBC # FLD: 0 K/UL — SIGNIFICANT CHANGE UP (ref 0–0)
NRBC # FLD: 0 K/UL — SIGNIFICANT CHANGE UP (ref 0–0)
PHOSPHATE SERPL-MCNC: 3.2 MG/DL — SIGNIFICANT CHANGE UP (ref 2.5–4.5)
PHOSPHATE SERPL-MCNC: 3.2 MG/DL — SIGNIFICANT CHANGE UP (ref 2.5–4.5)
PLATELET # BLD AUTO: 460 K/UL — HIGH (ref 150–400)
PLATELET # BLD AUTO: 460 K/UL — HIGH (ref 150–400)
POTASSIUM SERPL-MCNC: 3.6 MMOL/L — SIGNIFICANT CHANGE UP (ref 3.5–5.3)
POTASSIUM SERPL-MCNC: 3.6 MMOL/L — SIGNIFICANT CHANGE UP (ref 3.5–5.3)
POTASSIUM SERPL-SCNC: 3.6 MMOL/L — SIGNIFICANT CHANGE UP (ref 3.5–5.3)
POTASSIUM SERPL-SCNC: 3.6 MMOL/L — SIGNIFICANT CHANGE UP (ref 3.5–5.3)
RBC # BLD: 4.11 M/UL — SIGNIFICANT CHANGE UP (ref 3.8–5.2)
RBC # BLD: 4.11 M/UL — SIGNIFICANT CHANGE UP (ref 3.8–5.2)
RBC # FLD: 13.6 % — SIGNIFICANT CHANGE UP (ref 10.3–14.5)
RBC # FLD: 13.6 % — SIGNIFICANT CHANGE UP (ref 10.3–14.5)
SODIUM SERPL-SCNC: 137 MMOL/L — SIGNIFICANT CHANGE UP (ref 135–145)
SODIUM SERPL-SCNC: 137 MMOL/L — SIGNIFICANT CHANGE UP (ref 135–145)
WBC # BLD: 9.66 K/UL — SIGNIFICANT CHANGE UP (ref 3.8–10.5)
WBC # BLD: 9.66 K/UL — SIGNIFICANT CHANGE UP (ref 3.8–10.5)
WBC # FLD AUTO: 9.66 K/UL — SIGNIFICANT CHANGE UP (ref 3.8–10.5)
WBC # FLD AUTO: 9.66 K/UL — SIGNIFICANT CHANGE UP (ref 3.8–10.5)

## 2023-12-10 PROCEDURE — 99233 SBSQ HOSP IP/OBS HIGH 50: CPT

## 2023-12-10 RX ORDER — LACTULOSE 10 G/15ML
20 SOLUTION ORAL ONCE
Refills: 0 | Status: COMPLETED | OUTPATIENT
Start: 2023-12-10 | End: 2023-12-10

## 2023-12-10 RX ADMIN — OXYCODONE HYDROCHLORIDE 10 MILLIGRAM(S): 5 TABLET ORAL at 06:00

## 2023-12-10 RX ADMIN — ACETAZOLAMIDE 500 MILLIGRAM(S): 250 TABLET ORAL at 17:51

## 2023-12-10 RX ADMIN — BUMETANIDE 2 MILLIGRAM(S): 0.25 INJECTION INTRAMUSCULAR; INTRAVENOUS at 05:22

## 2023-12-10 RX ADMIN — INSULIN GLARGINE 40 UNIT(S): 100 INJECTION, SOLUTION SUBCUTANEOUS at 22:19

## 2023-12-10 RX ADMIN — Medication 10 UNIT(S): at 12:58

## 2023-12-10 RX ADMIN — Medication 10 UNIT(S): at 09:27

## 2023-12-10 RX ADMIN — LACTULOSE 20 GRAM(S): 10 SOLUTION ORAL at 22:21

## 2023-12-10 RX ADMIN — SPIRONOLACTONE 25 MILLIGRAM(S): 25 TABLET, FILM COATED ORAL at 05:24

## 2023-12-10 RX ADMIN — Medication 650 MILLIGRAM(S): at 13:00

## 2023-12-10 RX ADMIN — NALOXEGOL OXALATE 25 MILLIGRAM(S): 12.5 TABLET, FILM COATED ORAL at 11:33

## 2023-12-10 RX ADMIN — Medication 5 MILLIGRAM(S): at 13:00

## 2023-12-10 RX ADMIN — HYDROMORPHONE HYDROCHLORIDE 0.5 MILLIGRAM(S): 2 INJECTION INTRAMUSCULAR; INTRAVENOUS; SUBCUTANEOUS at 09:29

## 2023-12-10 RX ADMIN — Medication 2: at 22:19

## 2023-12-10 RX ADMIN — HYDROMORPHONE HYDROCHLORIDE 0.5 MILLIGRAM(S): 2 INJECTION INTRAMUSCULAR; INTRAVENOUS; SUBCUTANEOUS at 17:51

## 2023-12-10 RX ADMIN — Medication 1: at 09:27

## 2023-12-10 RX ADMIN — HYDROMORPHONE HYDROCHLORIDE 0.5 MILLIGRAM(S): 2 INJECTION INTRAMUSCULAR; INTRAVENOUS; SUBCUTANEOUS at 00:45

## 2023-12-10 RX ADMIN — LISINOPRIL 10 MILLIGRAM(S): 2.5 TABLET ORAL at 05:23

## 2023-12-10 RX ADMIN — Medication 10 UNIT(S): at 17:52

## 2023-12-10 RX ADMIN — Medication 2: at 12:59

## 2023-12-10 RX ADMIN — Medication 1: at 17:52

## 2023-12-10 RX ADMIN — HYDROMORPHONE HYDROCHLORIDE 0.5 MILLIGRAM(S): 2 INJECTION INTRAMUSCULAR; INTRAVENOUS; SUBCUTANEOUS at 00:00

## 2023-12-10 RX ADMIN — ACETAZOLAMIDE 500 MILLIGRAM(S): 250 TABLET ORAL at 05:22

## 2023-12-10 RX ADMIN — OXYCODONE HYDROCHLORIDE 10 MILLIGRAM(S): 5 TABLET ORAL at 11:33

## 2023-12-10 RX ADMIN — OXYCODONE HYDROCHLORIDE 10 MILLIGRAM(S): 5 TABLET ORAL at 05:21

## 2023-12-10 NOTE — PROGRESS NOTE ADULT - PROBLEM SELECTOR PLAN 1
Patient reports to have band-like headache, tinnitus, blurry vision, and photophobia, consistent with her previous episode of idiopathic intracranial HTN /pseudotumor cerebri   -Neurology and ophtho consulted, recs appreciated   -ED attempted to perform LP 10X but was not successful, patient refusing further attempts   -Plan for LP by neuroradiology on Monday   -CT head and venogram is negative for any acute pathology   -C/w Diamox  mg BID   -F/u with MRI brain, orbits w/wo contrast   -F/u with MRV w/ contrast  -Pain control Patient reports to have band-like headache, tinnitus, blurry vision, and photophobia, consistent with her previous episode of idiopathic intracranial HTN /pseudotumor cerebri   -Neurology and ophtho consulted, recs appreciated   -ED attempted to perform LP 10X but was not successful, patient refusing further attempts   -Plan for LP by neuroradiology on Monday   -CT head and venogram is negative for any acute pathology   -C/w Diamox  mg BID   -F/u with MRI/MRV brain, MRI orbits w/wo contrast (spine stimulator will need to be turned off prior to MRI)   -Pain control

## 2023-12-11 LAB
ANION GAP SERPL CALC-SCNC: 15 MMOL/L — HIGH (ref 7–14)
ANION GAP SERPL CALC-SCNC: 15 MMOL/L — HIGH (ref 7–14)
APPEARANCE CSF: CLEAR — SIGNIFICANT CHANGE UP
APPEARANCE CSF: CLEAR — SIGNIFICANT CHANGE UP
APPEARANCE SPUN FLD: COLORLESS — SIGNIFICANT CHANGE UP
APPEARANCE SPUN FLD: COLORLESS — SIGNIFICANT CHANGE UP
BASOPHILS # BLD AUTO: 0.03 K/UL — SIGNIFICANT CHANGE UP (ref 0–0.2)
BASOPHILS # BLD AUTO: 0.03 K/UL — SIGNIFICANT CHANGE UP (ref 0–0.2)
BASOPHILS NFR BLD AUTO: 0.3 % — SIGNIFICANT CHANGE UP (ref 0–2)
BASOPHILS NFR BLD AUTO: 0.3 % — SIGNIFICANT CHANGE UP (ref 0–2)
BUN SERPL-MCNC: 16 MG/DL — SIGNIFICANT CHANGE UP (ref 7–23)
BUN SERPL-MCNC: 16 MG/DL — SIGNIFICANT CHANGE UP (ref 7–23)
CALCIUM SERPL-MCNC: 9.3 MG/DL — SIGNIFICANT CHANGE UP (ref 8.4–10.5)
CALCIUM SERPL-MCNC: 9.3 MG/DL — SIGNIFICANT CHANGE UP (ref 8.4–10.5)
CHLORIDE SERPL-SCNC: 104 MMOL/L — SIGNIFICANT CHANGE UP (ref 98–107)
CHLORIDE SERPL-SCNC: 104 MMOL/L — SIGNIFICANT CHANGE UP (ref 98–107)
CO2 SERPL-SCNC: 18 MMOL/L — LOW (ref 22–31)
CO2 SERPL-SCNC: 18 MMOL/L — LOW (ref 22–31)
COLOR CSF: COLORLESS — SIGNIFICANT CHANGE UP
COLOR CSF: COLORLESS — SIGNIFICANT CHANGE UP
CREAT SERPL-MCNC: 0.86 MG/DL — SIGNIFICANT CHANGE UP (ref 0.5–1.3)
CREAT SERPL-MCNC: 0.86 MG/DL — SIGNIFICANT CHANGE UP (ref 0.5–1.3)
EGFR: 81 ML/MIN/1.73M2 — SIGNIFICANT CHANGE UP
EGFR: 81 ML/MIN/1.73M2 — SIGNIFICANT CHANGE UP
EOSINOPHIL # BLD AUTO: 0.29 K/UL — SIGNIFICANT CHANGE UP (ref 0–0.5)
EOSINOPHIL # BLD AUTO: 0.29 K/UL — SIGNIFICANT CHANGE UP (ref 0–0.5)
EOSINOPHIL NFR BLD AUTO: 3.1 % — SIGNIFICANT CHANGE UP (ref 0–6)
EOSINOPHIL NFR BLD AUTO: 3.1 % — SIGNIFICANT CHANGE UP (ref 0–6)
GLUCOSE BLDC GLUCOMTR-MCNC: 185 MG/DL — HIGH (ref 70–99)
GLUCOSE BLDC GLUCOMTR-MCNC: 185 MG/DL — HIGH (ref 70–99)
GLUCOSE BLDC GLUCOMTR-MCNC: 242 MG/DL — HIGH (ref 70–99)
GLUCOSE BLDC GLUCOMTR-MCNC: 242 MG/DL — HIGH (ref 70–99)
GLUCOSE BLDC GLUCOMTR-MCNC: 257 MG/DL — HIGH (ref 70–99)
GLUCOSE BLDC GLUCOMTR-MCNC: 257 MG/DL — HIGH (ref 70–99)
GLUCOSE BLDC GLUCOMTR-MCNC: 283 MG/DL — HIGH (ref 70–99)
GLUCOSE BLDC GLUCOMTR-MCNC: 283 MG/DL — HIGH (ref 70–99)
GLUCOSE BLDC GLUCOMTR-MCNC: 322 MG/DL — HIGH (ref 70–99)
GLUCOSE BLDC GLUCOMTR-MCNC: 322 MG/DL — HIGH (ref 70–99)
GLUCOSE CSF-MCNC: 138 MG/DL — HIGH (ref 40–70)
GLUCOSE CSF-MCNC: 138 MG/DL — HIGH (ref 40–70)
GLUCOSE SERPL-MCNC: 219 MG/DL — HIGH (ref 70–99)
GLUCOSE SERPL-MCNC: 219 MG/DL — HIGH (ref 70–99)
GRAM STN FLD: SIGNIFICANT CHANGE UP
GRAM STN FLD: SIGNIFICANT CHANGE UP
HCT VFR BLD CALC: 40 % — SIGNIFICANT CHANGE UP (ref 34.5–45)
HCT VFR BLD CALC: 40 % — SIGNIFICANT CHANGE UP (ref 34.5–45)
HGB BLD-MCNC: 13 G/DL — SIGNIFICANT CHANGE UP (ref 11.5–15.5)
HGB BLD-MCNC: 13 G/DL — SIGNIFICANT CHANGE UP (ref 11.5–15.5)
IANC: 5.08 K/UL — SIGNIFICANT CHANGE UP (ref 1.8–7.4)
IANC: 5.08 K/UL — SIGNIFICANT CHANGE UP (ref 1.8–7.4)
IMM GRANULOCYTES NFR BLD AUTO: 0.4 % — SIGNIFICANT CHANGE UP (ref 0–0.9)
IMM GRANULOCYTES NFR BLD AUTO: 0.4 % — SIGNIFICANT CHANGE UP (ref 0–0.9)
LDH CSF L TO P-CCNC: 17 U/L — SIGNIFICANT CHANGE UP
LDH CSF L TO P-CCNC: 17 U/L — SIGNIFICANT CHANGE UP
LDH FLD-CCNC: 17 U/L — SIGNIFICANT CHANGE UP
LDH FLD-CCNC: 17 U/L — SIGNIFICANT CHANGE UP
LYMPHOCYTES # BLD AUTO: 3.29 K/UL — SIGNIFICANT CHANGE UP (ref 1–3.3)
LYMPHOCYTES # BLD AUTO: 3.29 K/UL — SIGNIFICANT CHANGE UP (ref 1–3.3)
LYMPHOCYTES # BLD AUTO: 35.1 % — SIGNIFICANT CHANGE UP (ref 13–44)
LYMPHOCYTES # BLD AUTO: 35.1 % — SIGNIFICANT CHANGE UP (ref 13–44)
LYMPHOCYTES # CSF: 32 % — SIGNIFICANT CHANGE UP
LYMPHOCYTES # CSF: 32 % — SIGNIFICANT CHANGE UP
MAGNESIUM SERPL-MCNC: 1.9 MG/DL — SIGNIFICANT CHANGE UP (ref 1.6–2.6)
MAGNESIUM SERPL-MCNC: 1.9 MG/DL — SIGNIFICANT CHANGE UP (ref 1.6–2.6)
MCHC RBC-ENTMCNC: 29.5 PG — SIGNIFICANT CHANGE UP (ref 27–34)
MCHC RBC-ENTMCNC: 29.5 PG — SIGNIFICANT CHANGE UP (ref 27–34)
MCHC RBC-ENTMCNC: 32.5 GM/DL — SIGNIFICANT CHANGE UP (ref 32–36)
MCHC RBC-ENTMCNC: 32.5 GM/DL — SIGNIFICANT CHANGE UP (ref 32–36)
MCV RBC AUTO: 90.7 FL — SIGNIFICANT CHANGE UP (ref 80–100)
MCV RBC AUTO: 90.7 FL — SIGNIFICANT CHANGE UP (ref 80–100)
MONOCYTES # BLD AUTO: 0.64 K/UL — SIGNIFICANT CHANGE UP (ref 0–0.9)
MONOCYTES # BLD AUTO: 0.64 K/UL — SIGNIFICANT CHANGE UP (ref 0–0.9)
MONOCYTES NFR BLD AUTO: 6.8 % — SIGNIFICANT CHANGE UP (ref 2–14)
MONOCYTES NFR BLD AUTO: 6.8 % — SIGNIFICANT CHANGE UP (ref 2–14)
MONOS+MACROS NFR CSF: 37 % — SIGNIFICANT CHANGE UP
MONOS+MACROS NFR CSF: 37 % — SIGNIFICANT CHANGE UP
NEUTROPHILS # BLD AUTO: 5.08 K/UL — SIGNIFICANT CHANGE UP (ref 1.8–7.4)
NEUTROPHILS # BLD AUTO: 5.08 K/UL — SIGNIFICANT CHANGE UP (ref 1.8–7.4)
NEUTROPHILS # CSF: 4 % — SIGNIFICANT CHANGE UP
NEUTROPHILS # CSF: 4 % — SIGNIFICANT CHANGE UP
NEUTROPHILS NFR BLD AUTO: 54.3 % — SIGNIFICANT CHANGE UP (ref 43–77)
NEUTROPHILS NFR BLD AUTO: 54.3 % — SIGNIFICANT CHANGE UP (ref 43–77)
NIGHT BLUE STAIN TISS: SIGNIFICANT CHANGE UP
NIGHT BLUE STAIN TISS: SIGNIFICANT CHANGE UP
NRBC # BLD: 0 /100 WBCS — SIGNIFICANT CHANGE UP (ref 0–0)
NRBC # BLD: 0 /100 WBCS — SIGNIFICANT CHANGE UP (ref 0–0)
NRBC # FLD: 0 K/UL — SIGNIFICANT CHANGE UP (ref 0–0)
NRBC # FLD: 0 K/UL — SIGNIFICANT CHANGE UP (ref 0–0)
NRBC NFR CSF: 1 CELLS/UL — SIGNIFICANT CHANGE UP (ref 0–5)
NRBC NFR CSF: 1 CELLS/UL — SIGNIFICANT CHANGE UP (ref 0–5)
OTHER CELLS CSF MANUAL: 27 % — SIGNIFICANT CHANGE UP
OTHER CELLS CSF MANUAL: 27 % — SIGNIFICANT CHANGE UP
PHOSPHATE SERPL-MCNC: 3.1 MG/DL — SIGNIFICANT CHANGE UP (ref 2.5–4.5)
PHOSPHATE SERPL-MCNC: 3.1 MG/DL — SIGNIFICANT CHANGE UP (ref 2.5–4.5)
PLATELET # BLD AUTO: 493 K/UL — HIGH (ref 150–400)
PLATELET # BLD AUTO: 493 K/UL — HIGH (ref 150–400)
POTASSIUM SERPL-MCNC: 3.8 MMOL/L — SIGNIFICANT CHANGE UP (ref 3.5–5.3)
POTASSIUM SERPL-MCNC: 3.8 MMOL/L — SIGNIFICANT CHANGE UP (ref 3.5–5.3)
POTASSIUM SERPL-SCNC: 3.8 MMOL/L — SIGNIFICANT CHANGE UP (ref 3.5–5.3)
POTASSIUM SERPL-SCNC: 3.8 MMOL/L — SIGNIFICANT CHANGE UP (ref 3.5–5.3)
PROT CSF-MCNC: 20 MG/DL — SIGNIFICANT CHANGE UP (ref 15–45)
PROT CSF-MCNC: 20 MG/DL — SIGNIFICANT CHANGE UP (ref 15–45)
RBC # BLD: 4.41 M/UL — SIGNIFICANT CHANGE UP (ref 3.8–5.2)
RBC # BLD: 4.41 M/UL — SIGNIFICANT CHANGE UP (ref 3.8–5.2)
RBC # CSF: 100 CELLS/UL — HIGH (ref 0–0)
RBC # CSF: 100 CELLS/UL — HIGH (ref 0–0)
RBC # FLD: 13.5 % — SIGNIFICANT CHANGE UP (ref 10.3–14.5)
RBC # FLD: 13.5 % — SIGNIFICANT CHANGE UP (ref 10.3–14.5)
SODIUM SERPL-SCNC: 137 MMOL/L — SIGNIFICANT CHANGE UP (ref 135–145)
SODIUM SERPL-SCNC: 137 MMOL/L — SIGNIFICANT CHANGE UP (ref 135–145)
SPECIMEN SOURCE: SIGNIFICANT CHANGE UP
TOTAL CELLS COUNTED, SPINAL FLUID: 22 CELLS — SIGNIFICANT CHANGE UP
TOTAL CELLS COUNTED, SPINAL FLUID: 22 CELLS — SIGNIFICANT CHANGE UP
TUBE TYPE: SIGNIFICANT CHANGE UP
TUBE TYPE: SIGNIFICANT CHANGE UP
WBC # BLD: 9.37 K/UL — SIGNIFICANT CHANGE UP (ref 3.8–10.5)
WBC # BLD: 9.37 K/UL — SIGNIFICANT CHANGE UP (ref 3.8–10.5)
WBC # FLD AUTO: 9.37 K/UL — SIGNIFICANT CHANGE UP (ref 3.8–10.5)
WBC # FLD AUTO: 9.37 K/UL — SIGNIFICANT CHANGE UP (ref 3.8–10.5)

## 2023-12-11 PROCEDURE — 62328 DX LMBR SPI PNXR W/FLUOR/CT: CPT

## 2023-12-11 PROCEDURE — 88108 CYTOPATH CONCENTRATE TECH: CPT | Mod: 26

## 2023-12-11 PROCEDURE — 99233 SBSQ HOSP IP/OBS HIGH 50: CPT

## 2023-12-11 RX ORDER — ACETAMINOPHEN 500 MG
1000 TABLET ORAL ONCE
Refills: 0 | Status: DISCONTINUED | OUTPATIENT
Start: 2023-12-11 | End: 2023-12-20

## 2023-12-11 RX ORDER — LANOLIN ALCOHOL/MO/W.PET/CERES
3 CREAM (GRAM) TOPICAL ONCE
Refills: 0 | Status: COMPLETED | OUTPATIENT
Start: 2023-12-11 | End: 2023-12-11

## 2023-12-11 RX ADMIN — NALOXEGOL OXALATE 25 MILLIGRAM(S): 12.5 TABLET, FILM COATED ORAL at 13:50

## 2023-12-11 RX ADMIN — INSULIN GLARGINE 40 UNIT(S): 100 INJECTION, SOLUTION SUBCUTANEOUS at 23:02

## 2023-12-11 RX ADMIN — OXYCODONE HYDROCHLORIDE 10 MILLIGRAM(S): 5 TABLET ORAL at 16:32

## 2023-12-11 RX ADMIN — HYDROMORPHONE HYDROCHLORIDE 0.5 MILLIGRAM(S): 2 INJECTION INTRAMUSCULAR; INTRAVENOUS; SUBCUTANEOUS at 23:32

## 2023-12-11 RX ADMIN — Medication 3: at 18:13

## 2023-12-11 RX ADMIN — Medication 3 MILLIGRAM(S): at 23:03

## 2023-12-11 RX ADMIN — LISINOPRIL 10 MILLIGRAM(S): 2.5 TABLET ORAL at 05:47

## 2023-12-11 RX ADMIN — HYDROMORPHONE HYDROCHLORIDE 0.5 MILLIGRAM(S): 2 INJECTION INTRAMUSCULAR; INTRAVENOUS; SUBCUTANEOUS at 13:58

## 2023-12-11 RX ADMIN — SPIRONOLACTONE 25 MILLIGRAM(S): 25 TABLET, FILM COATED ORAL at 05:48

## 2023-12-11 RX ADMIN — ACETAZOLAMIDE 500 MILLIGRAM(S): 250 TABLET ORAL at 18:13

## 2023-12-11 RX ADMIN — Medication 5 MILLIGRAM(S): at 19:51

## 2023-12-11 RX ADMIN — HYDROMORPHONE HYDROCHLORIDE 0.5 MILLIGRAM(S): 2 INJECTION INTRAMUSCULAR; INTRAVENOUS; SUBCUTANEOUS at 23:02

## 2023-12-11 RX ADMIN — HYDROMORPHONE HYDROCHLORIDE 0.5 MILLIGRAM(S): 2 INJECTION INTRAMUSCULAR; INTRAVENOUS; SUBCUTANEOUS at 05:50

## 2023-12-11 RX ADMIN — HYDROMORPHONE HYDROCHLORIDE 0.5 MILLIGRAM(S): 2 INJECTION INTRAMUSCULAR; INTRAVENOUS; SUBCUTANEOUS at 14:30

## 2023-12-11 RX ADMIN — Medication 2: at 09:04

## 2023-12-11 RX ADMIN — OXYCODONE HYDROCHLORIDE 10 MILLIGRAM(S): 5 TABLET ORAL at 01:09

## 2023-12-11 RX ADMIN — ACETAZOLAMIDE 500 MILLIGRAM(S): 250 TABLET ORAL at 05:48

## 2023-12-11 RX ADMIN — OXYCODONE HYDROCHLORIDE 10 MILLIGRAM(S): 5 TABLET ORAL at 17:15

## 2023-12-11 RX ADMIN — BUMETANIDE 2 MILLIGRAM(S): 0.25 INJECTION INTRAMUSCULAR; INTRAVENOUS at 05:47

## 2023-12-11 RX ADMIN — OXYCODONE HYDROCHLORIDE 10 MILLIGRAM(S): 5 TABLET ORAL at 00:39

## 2023-12-11 RX ADMIN — Medication 10 UNIT(S): at 18:12

## 2023-12-11 RX ADMIN — Medication 1: at 13:51

## 2023-12-11 RX ADMIN — HYDROMORPHONE HYDROCHLORIDE 0.5 MILLIGRAM(S): 2 INJECTION INTRAMUSCULAR; INTRAVENOUS; SUBCUTANEOUS at 06:20

## 2023-12-11 NOTE — PROVIDER CONTACT NOTE (MEDICATION) - BACKGROUND
h/o CHF, HTN, DM, idiopathic intracranial hypertension, chronic back pain
h/o CHF, HTN, DM, idiopathic intracranial hypertension, chronic back pain

## 2023-12-11 NOTE — PROGRESS NOTE ADULT - SUBJECTIVE AND OBJECTIVE BOX
Patient is a 53y old  Female who presents with a chief complaint of Headache, tinnitus (10 Dec 2023 11:09)      SUBJECTIVE / OVERNIGHT EVENTS: Pt seen and examined at cath lab at 12:40pm, no overnight events, just had LP done, denies any headache or any other complaints, no other new issues reported.    MEDICATIONS  (STANDING):  acetaZOLAMIDE    Tablet 500 milliGRAM(s) Oral two times a day  buMETAnide 2 milliGRAM(s) Oral daily  dextrose 5%. 1000 milliLiter(s) (50 mL/Hr) IV Continuous <Continuous>  dextrose 5%. 1000 milliLiter(s) (100 mL/Hr) IV Continuous <Continuous>  dextrose 50% Injectable 25 Gram(s) IV Push once  dextrose 50% Injectable 25 Gram(s) IV Push once  dextrose 50% Injectable 12.5 Gram(s) IV Push once  glucagon  Injectable 1 milliGRAM(s) IntraMuscular once  insulin glargine Injectable (LANTUS) 40 Unit(s) SubCutaneous at bedtime  insulin lispro (ADMELOG) corrective regimen sliding scale   SubCutaneous at bedtime  insulin lispro (ADMELOG) corrective regimen sliding scale   SubCutaneous three times a day before meals  insulin lispro Injectable (ADMELOG) 10 Unit(s) SubCutaneous three times a day before meals  lisinopril 10 milliGRAM(s) Oral daily  naloxegol 25 milliGRAM(s) Oral daily  spironolactone 25 milliGRAM(s) Oral daily    MEDICATIONS  (PRN):  acetaminophen     Tablet .. 650 milliGRAM(s) Oral every 6 hours PRN Temp greater or equal to 38C (100.4F), Mild Pain (1 - 3)  acetaminophen   IVPB .. 1000 milliGRAM(s) IV Intermittent once PRN Mild Pain (1 - 3)  aluminum hydroxide/magnesium hydroxide/simethicone Suspension 30 milliLiter(s) Oral every 4 hours PRN Dyspepsia  baclofen 5 milliGRAM(s) Oral every 8 hours PRN Muscle Spasm  dextrose Oral Gel 15 Gram(s) Oral once PRN Blood Glucose LESS THAN 70 milliGRAM(s)/deciliter  HYDROmorphone  Injectable 0.5 milliGRAM(s) IV Push every 8 hours PRN Severe Pain (7 - 10)  melatonin 3 milliGRAM(s) Oral at bedtime PRN Insomnia  ondansetron Injectable 4 milliGRAM(s) IV Push every 8 hours PRN Nausea and/or Vomiting  oxyCODONE    IR 10 milliGRAM(s) Oral every 6 hours PRN Moderate Pain (4 - 6)      Vital Signs Last 24 Hrs  T(C): 37.2 (11 Dec 2023 05:42), Max: 37.2 (11 Dec 2023 05:42)  T(F): 98.9 (11 Dec 2023 05:42), Max: 98.9 (11 Dec 2023 05:42)  HR: 66 (11 Dec 2023 05:42) (58 - 75)  BP: 110/59 (11 Dec 2023 05:42) (106/54 - 131/64)  BP(mean): --  RR: 18 (11 Dec 2023 05:42) (18 - 18)  SpO2: 97% (11 Dec 2023 05:42) (97% - 100%)    Parameters below as of 11 Dec 2023 05:42  Patient On (Oxygen Delivery Method): room air      CAPILLARY BLOOD GLUCOSE      POCT Blood Glucose.: 185 mg/dL (11 Dec 2023 13:40)  POCT Blood Glucose.: 242 mg/dL (11 Dec 2023 09:00)  POCT Blood Glucose.: 315 mg/dL (10 Dec 2023 22:11)  POCT Blood Glucose.: 195 mg/dL (10 Dec 2023 17:43)    I&O's Summary    10 Dec 2023 07:01  -  11 Dec 2023 07:00  --------------------------------------------------------  IN: 0 mL / OUT: 1400 mL / NET: -1400 mL        PHYSICAL EXAM:  GENERAL: NAD  CHEST/LUNG: Clear to auscultation bilaterally; No wheeze  HEART: Regular rate and rhythm  ABDOMEN: Soft, Nontender, Nondistended  EXTREMITIES: no LE edema  PSYCH: Calm  NEUROLOGY: AAOx3  SKIN: No rashes or lesions    LABS:                        13.0   9.37  )-----------( 493      ( 11 Dec 2023 06:00 )             40.0     12-11    137  |  104  |  16  ----------------------------<  219<H>  3.8   |  18<L>  |  0.86    Ca    9.3      11 Dec 2023 06:00  Phos  3.1     12-11  Mg     1.90     12-11            Urinalysis Basic - ( 11 Dec 2023 06:00 )    Color: x / Appearance: x / SG: x / pH: x  Gluc: 219 mg/dL / Ketone: x  / Bili: x / Urobili: x   Blood: x / Protein: x / Nitrite: x   Leuk Esterase: x / RBC: x / WBC x   Sq Epi: x / Non Sq Epi: x / Bacteria: x        RADIOLOGY & ADDITIONAL TESTS:    Imaging Personally Reviewed:    Consultant(s) Notes Reviewed:      Care Discussed with Consultants/Other Providers:

## 2023-12-11 NOTE — PROGRESS NOTE ADULT - PROBLEM SELECTOR PLAN 1
Patient reports to have band-like headache, tinnitus, blurry vision, and photophobia, consistent with her previous episode of idiopathic intracranial HTN /pseudotumor cerebri   -Neurology and ophtho consulted, recs appreciated   -ED attempted to perform LP 10X but was not successful, patient refusing further attempts, Had LP today  -f/u neuro recs  -CT head and venogram is negative for any acute pathology   -C/w Diamox  mg BID   -F/u with MRI/MRV brain, MRI orbits w/wo contrast (spine stimulator will need to be turned off prior to MRI)   -Pain control

## 2023-12-11 NOTE — PROVIDER CONTACT NOTE (MEDICATION) - SITUATION
patient request to take oxycodone 10mg, dilaudid was given at 13:58
patient BP 96/54, HR 73. patient wants to take dilaudid IV push

## 2023-12-11 NOTE — PROVIDER CONTACT NOTE (MEDICATION) - ASSESSMENT
patient BP 96/54, HR 73. patient wants to take dilaudid IV push
patient request to take oxycodone 10mg, dilaudid was given at 13:58

## 2023-12-12 LAB
CSF COMMENTS: SIGNIFICANT CHANGE UP
CSF COMMENTS: SIGNIFICANT CHANGE UP
GLUCOSE BLDC GLUCOMTR-MCNC: 221 MG/DL — HIGH (ref 70–99)
GLUCOSE BLDC GLUCOMTR-MCNC: 221 MG/DL — HIGH (ref 70–99)
GLUCOSE BLDC GLUCOMTR-MCNC: 255 MG/DL — HIGH (ref 70–99)
GLUCOSE BLDC GLUCOMTR-MCNC: 255 MG/DL — HIGH (ref 70–99)
GLUCOSE BLDC GLUCOMTR-MCNC: 279 MG/DL — HIGH (ref 70–99)
GLUCOSE BLDC GLUCOMTR-MCNC: 279 MG/DL — HIGH (ref 70–99)
GLUCOSE BLDC GLUCOMTR-MCNC: 297 MG/DL — HIGH (ref 70–99)
GLUCOSE BLDC GLUCOMTR-MCNC: 297 MG/DL — HIGH (ref 70–99)
GLUCOSE BLDC GLUCOMTR-MCNC: 305 MG/DL — HIGH (ref 70–99)
GLUCOSE BLDC GLUCOMTR-MCNC: 305 MG/DL — HIGH (ref 70–99)
GLUCOSE BLDC GLUCOMTR-MCNC: 316 MG/DL — HIGH (ref 70–99)
GLUCOSE BLDC GLUCOMTR-MCNC: 316 MG/DL — HIGH (ref 70–99)

## 2023-12-12 PROCEDURE — 99233 SBSQ HOSP IP/OBS HIGH 50: CPT

## 2023-12-12 RX ORDER — INSULIN GLARGINE 100 [IU]/ML
44 INJECTION, SOLUTION SUBCUTANEOUS AT BEDTIME
Refills: 0 | Status: DISCONTINUED | OUTPATIENT
Start: 2023-12-12 | End: 2023-12-13

## 2023-12-12 RX ADMIN — SPIRONOLACTONE 25 MILLIGRAM(S): 25 TABLET, FILM COATED ORAL at 06:24

## 2023-12-12 RX ADMIN — Medication 2: at 18:29

## 2023-12-12 RX ADMIN — NALOXEGOL OXALATE 25 MILLIGRAM(S): 12.5 TABLET, FILM COATED ORAL at 13:44

## 2023-12-12 RX ADMIN — Medication 2: at 01:20

## 2023-12-12 RX ADMIN — ACETAZOLAMIDE 500 MILLIGRAM(S): 250 TABLET ORAL at 06:24

## 2023-12-12 RX ADMIN — HYDROMORPHONE HYDROCHLORIDE 0.5 MILLIGRAM(S): 2 INJECTION INTRAMUSCULAR; INTRAVENOUS; SUBCUTANEOUS at 07:42

## 2023-12-12 RX ADMIN — Medication 10 UNIT(S): at 18:28

## 2023-12-12 RX ADMIN — Medication 3 MILLIGRAM(S): at 00:19

## 2023-12-12 RX ADMIN — Medication 3: at 09:25

## 2023-12-12 RX ADMIN — OXYCODONE HYDROCHLORIDE 10 MILLIGRAM(S): 5 TABLET ORAL at 19:38

## 2023-12-12 RX ADMIN — INSULIN GLARGINE 44 UNIT(S): 100 INJECTION, SOLUTION SUBCUTANEOUS at 23:00

## 2023-12-12 RX ADMIN — OXYCODONE HYDROCHLORIDE 10 MILLIGRAM(S): 5 TABLET ORAL at 10:27

## 2023-12-12 RX ADMIN — OXYCODONE HYDROCHLORIDE 10 MILLIGRAM(S): 5 TABLET ORAL at 10:57

## 2023-12-12 RX ADMIN — Medication 10 UNIT(S): at 09:25

## 2023-12-12 RX ADMIN — Medication 1: at 23:01

## 2023-12-12 RX ADMIN — LISINOPRIL 10 MILLIGRAM(S): 2.5 TABLET ORAL at 06:24

## 2023-12-12 RX ADMIN — Medication 3: at 13:21

## 2023-12-12 RX ADMIN — ACETAZOLAMIDE 500 MILLIGRAM(S): 250 TABLET ORAL at 18:32

## 2023-12-12 RX ADMIN — HYDROMORPHONE HYDROCHLORIDE 0.5 MILLIGRAM(S): 2 INJECTION INTRAMUSCULAR; INTRAVENOUS; SUBCUTANEOUS at 15:54

## 2023-12-12 RX ADMIN — BUMETANIDE 2 MILLIGRAM(S): 0.25 INJECTION INTRAMUSCULAR; INTRAVENOUS at 06:24

## 2023-12-12 RX ADMIN — Medication 650 MILLIGRAM(S): at 13:49

## 2023-12-12 RX ADMIN — Medication 10 UNIT(S): at 13:21

## 2023-12-12 NOTE — PROGRESS NOTE ADULT - PROBLEM SELECTOR PLAN 1
Patient reports to have band-like headache, tinnitus, blurry vision, and photophobia, consistent with her previous episode of idiopathic intracranial HTN /pseudotumor cerebri   -Neurology and ophtho consulted, recs appreciated   -ED attempted to perform LP 10X but was not successful, patient refusing further attempts, Had LP on 12/11, opening pressure measured 11 cm water. Given OP is not elevated, patient's episodes of transient vision loss cannot be attributed to elevated ICP. Recommend MRI brain and orbits with and without contrast and MRV. Appreciate neuro eval. Please continue diamox   -f/u neuro recs, called Neuro as well   -CT head and venogram is negative for any acute pathology   -C/w Diamox  mg BID   -F/u with MRI/MRV brain, MRI orbits w/wo contrast (spine stimulator will need to be turned off prior to MRI)   -Pain control

## 2023-12-12 NOTE — PROGRESS NOTE ADULT - PROBLEM SELECTOR PLAN 3
-HgbA1c 7.5  -Increased lantus to 44 units qhs, cont admelog 10 untis TID, will monitor and adjust insulin   -C/w ISS   -monitor fingersticks

## 2023-12-12 NOTE — CHART NOTE - NSCHARTNOTEFT_GEN_A_CORE
LP performed on 12/11/23 and opening pressure measured 11 cm water. Given OP is not elevated, patient's episodes of transient vision loss cannot be attributed to elevated ICP. Recommend MRI brain and orbits with and without contrast and MRV. Appreciate neuro eval. Please continue diamox per neuro.     Mary Reyes MD  PGY3 ophthalmology Resident  [facility] performed on 12/11/23 and opening pressure measured 11 cm water. Given OP is not elevated, patient's episodes of transient vision loss cannot be attributed to elevated ICP. Recommend MRI brain and orbits with and without contrast and MRV. Appreciate neuro eval. Please continue diamox per neuro.     Mary Reyes MD  PGY3 ophthalmology Resident  [facility]

## 2023-12-12 NOTE — PROGRESS NOTE ADULT - SUBJECTIVE AND OBJECTIVE BOX
Patient is a 53y old  Female who presents with a chief complaint of Headache, tinnitus (11 Dec 2023 16:05)      SUBJECTIVE / OVERNIGHT EVENTS: Pt seen and examined at cath lab at 11:08am, no overnight events, has nausea, denies any vomiting, still c/o headache, no other complaints, no other new issues reported.      MEDICATIONS  (STANDING):  acetaZOLAMIDE    Tablet 500 milliGRAM(s) Oral two times a day  buMETAnide 2 milliGRAM(s) Oral daily  dextrose 5%. 1000 milliLiter(s) (50 mL/Hr) IV Continuous <Continuous>  dextrose 5%. 1000 milliLiter(s) (100 mL/Hr) IV Continuous <Continuous>  dextrose 50% Injectable 25 Gram(s) IV Push once  dextrose 50% Injectable 12.5 Gram(s) IV Push once  dextrose 50% Injectable 25 Gram(s) IV Push once  glucagon  Injectable 1 milliGRAM(s) IntraMuscular once  insulin glargine Injectable (LANTUS) 40 Unit(s) SubCutaneous at bedtime  insulin lispro (ADMELOG) corrective regimen sliding scale   SubCutaneous at bedtime  insulin lispro (ADMELOG) corrective regimen sliding scale   SubCutaneous three times a day before meals  insulin lispro Injectable (ADMELOG) 10 Unit(s) SubCutaneous three times a day before meals  lisinopril 10 milliGRAM(s) Oral daily  naloxegol 25 milliGRAM(s) Oral daily  spironolactone 25 milliGRAM(s) Oral daily    MEDICATIONS  (PRN):  acetaminophen     Tablet .. 650 milliGRAM(s) Oral every 6 hours PRN Temp greater or equal to 38C (100.4F), Mild Pain (1 - 3)  acetaminophen   IVPB .. 1000 milliGRAM(s) IV Intermittent once PRN Mild Pain (1 - 3)  aluminum hydroxide/magnesium hydroxide/simethicone Suspension 30 milliLiter(s) Oral every 4 hours PRN Dyspepsia  baclofen 5 milliGRAM(s) Oral every 8 hours PRN Muscle Spasm  dextrose Oral Gel 15 Gram(s) Oral once PRN Blood Glucose LESS THAN 70 milliGRAM(s)/deciliter  HYDROmorphone  Injectable 0.5 milliGRAM(s) IV Push every 8 hours PRN Severe Pain (7 - 10)  melatonin 3 milliGRAM(s) Oral at bedtime PRN Insomnia  ondansetron Injectable 4 milliGRAM(s) IV Push every 8 hours PRN Nausea and/or Vomiting  oxyCODONE    IR 10 milliGRAM(s) Oral every 6 hours PRN Moderate Pain (4 - 6)      Vital Signs Last 24 Hrs  T(C): 37.2 (12 Dec 2023 10:09), Max: 37.2 (12 Dec 2023 10:09)  T(F): 99 (12 Dec 2023 10:09), Max: 99 (12 Dec 2023 10:09)  HR: 81 (12 Dec 2023 10:09) (66 - 81)  BP: 128/89 (12 Dec 2023 10:09) (99/49 - 130/50)  BP(mean): --  RR: 18 (12 Dec 2023 10:09) (18 - 18)  SpO2: 100% (12 Dec 2023 10:09) (97% - 100%)    Parameters below as of 12 Dec 2023 10:09  Patient On (Oxygen Delivery Method): room air      CAPILLARY BLOOD GLUCOSE      POCT Blood Glucose.: 297 mg/dL (12 Dec 2023 12:41)  POCT Blood Glucose.: 255 mg/dL (12 Dec 2023 09:02)  POCT Blood Glucose.: 316 mg/dL (12 Dec 2023 01:19)  POCT Blood Glucose.: 283 mg/dL (11 Dec 2023 22:30)  POCT Blood Glucose.: 322 mg/dL (11 Dec 2023 22:29)  POCT Blood Glucose.: 257 mg/dL (11 Dec 2023 17:33)    I&O's Summary    11 Dec 2023 07:01  -  12 Dec 2023 07:00  --------------------------------------------------------  IN: 820 mL / OUT: 500 mL / NET: 320 mL        PHYSICAL EXAM:  GENERAL: NAD  CHEST/LUNG: Clear to auscultation bilaterally; No wheeze  HEART: Regular rate and rhythm  ABDOMEN: Soft, Nontender, Nondistended  EXTREMITIES: no LE edema  PSYCH: Calm  NEUROLOGY: AAOx3  SKIN: No rashes or lesions    LABS:                        13.0   9.37  )-----------( 493      ( 11 Dec 2023 06:00 )             40.0     12-11    137  |  104  |  16  ----------------------------<  219<H>  3.8   |  18<L>  |  0.86    Ca    9.3      11 Dec 2023 06:00  Phos  3.1     12-11  Mg     1.90     12-11            Urinalysis Basic - ( 11 Dec 2023 06:00 )    Color: x / Appearance: x / SG: x / pH: x  Gluc: 219 mg/dL / Ketone: x  / Bili: x / Urobili: x   Blood: x / Protein: x / Nitrite: x   Leuk Esterase: x / RBC: x / WBC x   Sq Epi: x / Non Sq Epi: x / Bacteria: x        RADIOLOGY & ADDITIONAL TESTS:    Imaging Personally Reviewed:    Consultant(s) Notes Reviewed:      Care Discussed with Consultants/Other Providers:

## 2023-12-12 NOTE — PROGRESS NOTE ADULT - PROBLEM SELECTOR PLAN 2
Chronic HFpEF, euvolemic on exam   -Not in acute exacerbation   -c/w bumex 2 mg PO daily   -c/w lisinopril 10 mg   -c/w spironolactone 25 mg daily

## 2023-12-13 LAB
ANION GAP SERPL CALC-SCNC: 10 MMOL/L — SIGNIFICANT CHANGE UP (ref 7–14)
ANION GAP SERPL CALC-SCNC: 10 MMOL/L — SIGNIFICANT CHANGE UP (ref 7–14)
BUN SERPL-MCNC: 20 MG/DL — SIGNIFICANT CHANGE UP (ref 7–23)
BUN SERPL-MCNC: 20 MG/DL — SIGNIFICANT CHANGE UP (ref 7–23)
CALCIUM SERPL-MCNC: 9 MG/DL — SIGNIFICANT CHANGE UP (ref 8.4–10.5)
CALCIUM SERPL-MCNC: 9 MG/DL — SIGNIFICANT CHANGE UP (ref 8.4–10.5)
CHLORIDE SERPL-SCNC: 104 MMOL/L — SIGNIFICANT CHANGE UP (ref 98–107)
CHLORIDE SERPL-SCNC: 104 MMOL/L — SIGNIFICANT CHANGE UP (ref 98–107)
CO2 SERPL-SCNC: 20 MMOL/L — LOW (ref 22–31)
CO2 SERPL-SCNC: 20 MMOL/L — LOW (ref 22–31)
CREAT SERPL-MCNC: 0.86 MG/DL — SIGNIFICANT CHANGE UP (ref 0.5–1.3)
CREAT SERPL-MCNC: 0.86 MG/DL — SIGNIFICANT CHANGE UP (ref 0.5–1.3)
EGFR: 81 ML/MIN/1.73M2 — SIGNIFICANT CHANGE UP
EGFR: 81 ML/MIN/1.73M2 — SIGNIFICANT CHANGE UP
GLUCOSE BLDC GLUCOMTR-MCNC: 211 MG/DL — HIGH (ref 70–99)
GLUCOSE BLDC GLUCOMTR-MCNC: 211 MG/DL — HIGH (ref 70–99)
GLUCOSE BLDC GLUCOMTR-MCNC: 277 MG/DL — HIGH (ref 70–99)
GLUCOSE BLDC GLUCOMTR-MCNC: 277 MG/DL — HIGH (ref 70–99)
GLUCOSE BLDC GLUCOMTR-MCNC: 293 MG/DL — HIGH (ref 70–99)
GLUCOSE BLDC GLUCOMTR-MCNC: 293 MG/DL — HIGH (ref 70–99)
GLUCOSE BLDC GLUCOMTR-MCNC: 329 MG/DL — HIGH (ref 70–99)
GLUCOSE BLDC GLUCOMTR-MCNC: 329 MG/DL — HIGH (ref 70–99)
GLUCOSE BLDC GLUCOMTR-MCNC: 364 MG/DL — HIGH (ref 70–99)
GLUCOSE BLDC GLUCOMTR-MCNC: 364 MG/DL — HIGH (ref 70–99)
GLUCOSE SERPL-MCNC: 277 MG/DL — HIGH (ref 70–99)
GLUCOSE SERPL-MCNC: 277 MG/DL — HIGH (ref 70–99)
HCT VFR BLD CALC: 36.6 % — SIGNIFICANT CHANGE UP (ref 34.5–45)
HCT VFR BLD CALC: 36.6 % — SIGNIFICANT CHANGE UP (ref 34.5–45)
HGB BLD-MCNC: 11.8 G/DL — SIGNIFICANT CHANGE UP (ref 11.5–15.5)
HGB BLD-MCNC: 11.8 G/DL — SIGNIFICANT CHANGE UP (ref 11.5–15.5)
MAGNESIUM SERPL-MCNC: 1.7 MG/DL — SIGNIFICANT CHANGE UP (ref 1.6–2.6)
MAGNESIUM SERPL-MCNC: 1.7 MG/DL — SIGNIFICANT CHANGE UP (ref 1.6–2.6)
MCHC RBC-ENTMCNC: 29.5 PG — SIGNIFICANT CHANGE UP (ref 27–34)
MCHC RBC-ENTMCNC: 29.5 PG — SIGNIFICANT CHANGE UP (ref 27–34)
MCHC RBC-ENTMCNC: 32.2 GM/DL — SIGNIFICANT CHANGE UP (ref 32–36)
MCHC RBC-ENTMCNC: 32.2 GM/DL — SIGNIFICANT CHANGE UP (ref 32–36)
MCV RBC AUTO: 91.5 FL — SIGNIFICANT CHANGE UP (ref 80–100)
MCV RBC AUTO: 91.5 FL — SIGNIFICANT CHANGE UP (ref 80–100)
NRBC # BLD: 0 /100 WBCS — SIGNIFICANT CHANGE UP (ref 0–0)
NRBC # BLD: 0 /100 WBCS — SIGNIFICANT CHANGE UP (ref 0–0)
NRBC # FLD: 0 K/UL — SIGNIFICANT CHANGE UP (ref 0–0)
NRBC # FLD: 0 K/UL — SIGNIFICANT CHANGE UP (ref 0–0)
PHOSPHATE SERPL-MCNC: 2.5 MG/DL — SIGNIFICANT CHANGE UP (ref 2.5–4.5)
PHOSPHATE SERPL-MCNC: 2.5 MG/DL — SIGNIFICANT CHANGE UP (ref 2.5–4.5)
PLATELET # BLD AUTO: 415 K/UL — HIGH (ref 150–400)
PLATELET # BLD AUTO: 415 K/UL — HIGH (ref 150–400)
POTASSIUM SERPL-MCNC: 3.9 MMOL/L — SIGNIFICANT CHANGE UP (ref 3.5–5.3)
POTASSIUM SERPL-MCNC: 3.9 MMOL/L — SIGNIFICANT CHANGE UP (ref 3.5–5.3)
POTASSIUM SERPL-SCNC: 3.9 MMOL/L — SIGNIFICANT CHANGE UP (ref 3.5–5.3)
POTASSIUM SERPL-SCNC: 3.9 MMOL/L — SIGNIFICANT CHANGE UP (ref 3.5–5.3)
RBC # BLD: 4 M/UL — SIGNIFICANT CHANGE UP (ref 3.8–5.2)
RBC # BLD: 4 M/UL — SIGNIFICANT CHANGE UP (ref 3.8–5.2)
RBC # FLD: 13.2 % — SIGNIFICANT CHANGE UP (ref 10.3–14.5)
RBC # FLD: 13.2 % — SIGNIFICANT CHANGE UP (ref 10.3–14.5)
SODIUM SERPL-SCNC: 134 MMOL/L — LOW (ref 135–145)
SODIUM SERPL-SCNC: 134 MMOL/L — LOW (ref 135–145)
WBC # BLD: 9.27 K/UL — SIGNIFICANT CHANGE UP (ref 3.8–10.5)
WBC # BLD: 9.27 K/UL — SIGNIFICANT CHANGE UP (ref 3.8–10.5)
WBC # FLD AUTO: 9.27 K/UL — SIGNIFICANT CHANGE UP (ref 3.8–10.5)
WBC # FLD AUTO: 9.27 K/UL — SIGNIFICANT CHANGE UP (ref 3.8–10.5)

## 2023-12-13 PROCEDURE — 99232 SBSQ HOSP IP/OBS MODERATE 35: CPT

## 2023-12-13 RX ORDER — INSULIN LISPRO 100/ML
12 VIAL (ML) SUBCUTANEOUS
Refills: 0 | Status: DISCONTINUED | OUTPATIENT
Start: 2023-12-13 | End: 2023-12-14

## 2023-12-13 RX ORDER — CARVEDILOL PHOSPHATE 80 MG/1
3.12 CAPSULE, EXTENDED RELEASE ORAL EVERY 12 HOURS
Refills: 0 | Status: DISCONTINUED | OUTPATIENT
Start: 2023-12-13 | End: 2023-12-29

## 2023-12-13 RX ORDER — INSULIN GLARGINE 100 [IU]/ML
50 INJECTION, SOLUTION SUBCUTANEOUS AT BEDTIME
Refills: 0 | Status: DISCONTINUED | OUTPATIENT
Start: 2023-12-13 | End: 2023-12-17

## 2023-12-13 RX ADMIN — HYDROMORPHONE HYDROCHLORIDE 0.5 MILLIGRAM(S): 2 INJECTION INTRAMUSCULAR; INTRAVENOUS; SUBCUTANEOUS at 00:01

## 2023-12-13 RX ADMIN — HYDROMORPHONE HYDROCHLORIDE 0.5 MILLIGRAM(S): 2 INJECTION INTRAMUSCULAR; INTRAVENOUS; SUBCUTANEOUS at 17:53

## 2023-12-13 RX ADMIN — ACETAZOLAMIDE 500 MILLIGRAM(S): 250 TABLET ORAL at 06:11

## 2023-12-13 RX ADMIN — LISINOPRIL 10 MILLIGRAM(S): 2.5 TABLET ORAL at 10:51

## 2023-12-13 RX ADMIN — Medication 10 UNIT(S): at 09:10

## 2023-12-13 RX ADMIN — HYDROMORPHONE HYDROCHLORIDE 0.5 MILLIGRAM(S): 2 INJECTION INTRAMUSCULAR; INTRAVENOUS; SUBCUTANEOUS at 00:31

## 2023-12-13 RX ADMIN — Medication 3: at 22:13

## 2023-12-13 RX ADMIN — OXYCODONE HYDROCHLORIDE 10 MILLIGRAM(S): 5 TABLET ORAL at 10:51

## 2023-12-13 RX ADMIN — OXYCODONE HYDROCHLORIDE 10 MILLIGRAM(S): 5 TABLET ORAL at 22:23

## 2023-12-13 RX ADMIN — SPIRONOLACTONE 25 MILLIGRAM(S): 25 TABLET, FILM COATED ORAL at 10:51

## 2023-12-13 RX ADMIN — Medication 4: at 13:46

## 2023-12-13 RX ADMIN — NALOXEGOL OXALATE 25 MILLIGRAM(S): 12.5 TABLET, FILM COATED ORAL at 13:02

## 2023-12-13 RX ADMIN — OXYCODONE HYDROCHLORIDE 10 MILLIGRAM(S): 5 TABLET ORAL at 11:51

## 2023-12-13 RX ADMIN — HYDROMORPHONE HYDROCHLORIDE 0.5 MILLIGRAM(S): 2 INJECTION INTRAMUSCULAR; INTRAVENOUS; SUBCUTANEOUS at 18:53

## 2023-12-13 RX ADMIN — OXYCODONE HYDROCHLORIDE 10 MILLIGRAM(S): 5 TABLET ORAL at 21:43

## 2023-12-13 RX ADMIN — Medication 2: at 09:10

## 2023-12-13 RX ADMIN — Medication 10 UNIT(S): at 13:45

## 2023-12-13 RX ADMIN — Medication 3 MILLIGRAM(S): at 22:12

## 2023-12-13 RX ADMIN — INSULIN GLARGINE 50 UNIT(S): 100 INJECTION, SOLUTION SUBCUTANEOUS at 22:12

## 2023-12-13 RX ADMIN — Medication 3: at 17:55

## 2023-12-13 RX ADMIN — ACETAZOLAMIDE 500 MILLIGRAM(S): 250 TABLET ORAL at 17:38

## 2023-12-13 RX ADMIN — Medication 12 UNIT(S): at 17:56

## 2023-12-13 RX ADMIN — BUMETANIDE 2 MILLIGRAM(S): 0.25 INJECTION INTRAMUSCULAR; INTRAVENOUS at 06:11

## 2023-12-13 RX ADMIN — CARVEDILOL PHOSPHATE 3.12 MILLIGRAM(S): 80 CAPSULE, EXTENDED RELEASE ORAL at 17:38

## 2023-12-13 NOTE — PROGRESS NOTE ADULT - SUBJECTIVE AND OBJECTIVE BOX
Patient is a 53y old  Female who presents with a chief complaint of Headache, tinnitus (12 Dec 2023 15:16)      SUBJECTIVE / OVERNIGHT EVENTS: Pt seen and examined at 11:24am, no overnight events, has nausea, denies any vomiting, still c/o headache, also has chronic back pain, no other complaints, says that she takes coreg 12.5mg bid, no other new issues reported.      MEDICATIONS  (STANDING):  acetaZOLAMIDE    Tablet 500 milliGRAM(s) Oral two times a day  buMETAnide 2 milliGRAM(s) Oral daily  carvedilol 3.125 milliGRAM(s) Oral every 12 hours  dextrose 5%. 1000 milliLiter(s) (50 mL/Hr) IV Continuous <Continuous>  dextrose 5%. 1000 milliLiter(s) (100 mL/Hr) IV Continuous <Continuous>  dextrose 50% Injectable 25 Gram(s) IV Push once  dextrose 50% Injectable 12.5 Gram(s) IV Push once  dextrose 50% Injectable 25 Gram(s) IV Push once  glucagon  Injectable 1 milliGRAM(s) IntraMuscular once  insulin glargine Injectable (LANTUS) 50 Unit(s) SubCutaneous at bedtime  insulin lispro (ADMELOG) corrective regimen sliding scale   SubCutaneous at bedtime  insulin lispro (ADMELOG) corrective regimen sliding scale   SubCutaneous three times a day before meals  insulin lispro Injectable (ADMELOG) 12 Unit(s) SubCutaneous three times a day before meals  lisinopril 10 milliGRAM(s) Oral daily  naloxegol 25 milliGRAM(s) Oral daily  spironolactone 25 milliGRAM(s) Oral daily    MEDICATIONS  (PRN):  acetaminophen     Tablet .. 650 milliGRAM(s) Oral every 6 hours PRN Temp greater or equal to 38C (100.4F), Mild Pain (1 - 3)  acetaminophen   IVPB .. 1000 milliGRAM(s) IV Intermittent once PRN Mild Pain (1 - 3)  aluminum hydroxide/magnesium hydroxide/simethicone Suspension 30 milliLiter(s) Oral every 4 hours PRN Dyspepsia  baclofen 5 milliGRAM(s) Oral every 8 hours PRN Muscle Spasm  dextrose Oral Gel 15 Gram(s) Oral once PRN Blood Glucose LESS THAN 70 milliGRAM(s)/deciliter  HYDROmorphone  Injectable 0.5 milliGRAM(s) IV Push every 8 hours PRN Severe Pain (7 - 10)  melatonin 3 milliGRAM(s) Oral at bedtime PRN Insomnia  ondansetron Injectable 4 milliGRAM(s) IV Push every 8 hours PRN Nausea and/or Vomiting  oxyCODONE    IR 10 milliGRAM(s) Oral every 6 hours PRN Moderate Pain (4 - 6)      Vital Signs Last 24 Hrs  T(C): 36.7 (13 Dec 2023 10:57), Max: 36.7 (12 Dec 2023 17:17)  T(F): 98 (13 Dec 2023 10:57), Max: 98.1 (12 Dec 2023 17:17)  HR: 69 (13 Dec 2023 10:57) (65 - 70)  BP: 121/58 (13 Dec 2023 10:57) (100/48 - 121/58)  BP(mean): --  RR: 18 (13 Dec 2023 10:57) (18 - 18)  SpO2: 99% (13 Dec 2023 10:57) (96% - 99%)    Parameters below as of 13 Dec 2023 10:57  Patient On (Oxygen Delivery Method): room air      CAPILLARY BLOOD GLUCOSE      POCT Blood Glucose.: 329 mg/dL (13 Dec 2023 13:45)  POCT Blood Glucose.: 277 mg/dL (13 Dec 2023 12:21)  POCT Blood Glucose.: 211 mg/dL (13 Dec 2023 08:43)  POCT Blood Glucose.: 279 mg/dL (12 Dec 2023 22:10)  POCT Blood Glucose.: 305 mg/dL (12 Dec 2023 22:09)  POCT Blood Glucose.: 221 mg/dL (12 Dec 2023 17:45)    I&O's Summary    12 Dec 2023 07:01  -  13 Dec 2023 07:00  --------------------------------------------------------  IN: 120 mL / OUT: 950 mL / NET: -830 mL    13 Dec 2023 07:01  -  13 Dec 2023 16:42  --------------------------------------------------------  IN: 1200 mL / OUT: 1850 mL / NET: -650 mL        PHYSICAL EXAM:  GENERAL: NAD  CHEST/LUNG: Clear to auscultation bilaterally; No wheeze  HEART: Regular rate and rhythm  ABDOMEN: Soft, Nontender, Nondistended  EXTREMITIES: no LE edema  PSYCH: Calm  NEUROLOGY: AAOx3  SKIN: No rashes or lesions    LABS:                        11.8   9.27  )-----------( 415      ( 13 Dec 2023 05:55 )             36.6     12-13    134<L>  |  104  |  20  ----------------------------<  277<H>  3.9   |  20<L>  |  0.86    Ca    9.0      13 Dec 2023 05:55  Phos  2.5     12-13  Mg     1.70     12-13            Urinalysis Basic - ( 13 Dec 2023 05:55 )    Color: x / Appearance: x / SG: x / pH: x  Gluc: 277 mg/dL / Ketone: x  / Bili: x / Urobili: x   Blood: x / Protein: x / Nitrite: x   Leuk Esterase: x / RBC: x / WBC x   Sq Epi: x / Non Sq Epi: x / Bacteria: x        RADIOLOGY & ADDITIONAL TESTS:    Imaging Personally Reviewed:    Consultant(s) Notes Reviewed:      Care Discussed with Consultants/Other Providers:

## 2023-12-13 NOTE — PROGRESS NOTE ADULT - PROBLEM SELECTOR PLAN 1
Patient reports to have band-like headache, tinnitus, blurry vision, and photophobia, consistent with her previous episode of idiopathic intracranial HTN /pseudotumor cerebri   -Neurology and ophtho consulted, recs appreciated   -ED attempted to perform LP 10X but was not successful, Had LP on 12/11, opening pressure measured 11 cm water. Given OP is not elevated, patient's episodes of transient vision loss cannot be attributed to elevated ICP. Recommend MRI brain and orbits with and without contrast and MRV. Appreciate neuro eval. Please continue diamox   -f/u neuro recs, called Neuro as well   -CT head and venogram is negative for any acute pathology   -C/w Diamox  mg BID   -F/u with MRI/MRV brain, MRI orbits w/wo contrast (spine stimulator will need to be turned off prior to MRI)   -Pain control

## 2023-12-13 NOTE — PROGRESS NOTE ADULT - PROBLEM SELECTOR PLAN 3
-HgbA1c 7.5  -Increased lantus to 50 units qhs, increase admelog to 12 units TID, will monitor and adjust insulin   -C/w ISS   -monitor fingersticks

## 2023-12-13 NOTE — PROGRESS NOTE ADULT - PROBLEM SELECTOR PLAN 4
-C/w lisinopril 10 mg daily   -C/w spironolactone 25 mg daily   --started on coreg 12.5mg q12h  -monitor BP

## 2023-12-13 NOTE — PROGRESS NOTE ADULT - PROBLEM SELECTOR PLAN 2
Chronic HFpEF, euvolemic on exam   -Not in acute exacerbation   -c/w bumex 2 mg PO daily   -c/w lisinopril 10 mg   -c/w spironolactone 25 mg daily  -started on coreg 12.5mg q12h

## 2023-12-14 ENCOUNTER — TRANSCRIPTION ENCOUNTER (OUTPATIENT)
Age: 53
End: 2023-12-14

## 2023-12-14 LAB
ANION GAP SERPL CALC-SCNC: 13 MMOL/L — SIGNIFICANT CHANGE UP (ref 7–14)
ANION GAP SERPL CALC-SCNC: 13 MMOL/L — SIGNIFICANT CHANGE UP (ref 7–14)
BUN SERPL-MCNC: 18 MG/DL — SIGNIFICANT CHANGE UP (ref 7–23)
BUN SERPL-MCNC: 18 MG/DL — SIGNIFICANT CHANGE UP (ref 7–23)
CALCIUM SERPL-MCNC: 8.6 MG/DL — SIGNIFICANT CHANGE UP (ref 8.4–10.5)
CALCIUM SERPL-MCNC: 8.6 MG/DL — SIGNIFICANT CHANGE UP (ref 8.4–10.5)
CHLORIDE SERPL-SCNC: 106 MMOL/L — SIGNIFICANT CHANGE UP (ref 98–107)
CHLORIDE SERPL-SCNC: 106 MMOL/L — SIGNIFICANT CHANGE UP (ref 98–107)
CO2 SERPL-SCNC: 18 MMOL/L — LOW (ref 22–31)
CO2 SERPL-SCNC: 18 MMOL/L — LOW (ref 22–31)
CREAT SERPL-MCNC: 0.81 MG/DL — SIGNIFICANT CHANGE UP (ref 0.5–1.3)
CREAT SERPL-MCNC: 0.81 MG/DL — SIGNIFICANT CHANGE UP (ref 0.5–1.3)
EGFR: 87 ML/MIN/1.73M2 — SIGNIFICANT CHANGE UP
EGFR: 87 ML/MIN/1.73M2 — SIGNIFICANT CHANGE UP
GLUCOSE BLDC GLUCOMTR-MCNC: 181 MG/DL — HIGH (ref 70–99)
GLUCOSE BLDC GLUCOMTR-MCNC: 181 MG/DL — HIGH (ref 70–99)
GLUCOSE BLDC GLUCOMTR-MCNC: 182 MG/DL — HIGH (ref 70–99)
GLUCOSE BLDC GLUCOMTR-MCNC: 182 MG/DL — HIGH (ref 70–99)
GLUCOSE BLDC GLUCOMTR-MCNC: 203 MG/DL — HIGH (ref 70–99)
GLUCOSE BLDC GLUCOMTR-MCNC: 203 MG/DL — HIGH (ref 70–99)
GLUCOSE BLDC GLUCOMTR-MCNC: 250 MG/DL — HIGH (ref 70–99)
GLUCOSE BLDC GLUCOMTR-MCNC: 250 MG/DL — HIGH (ref 70–99)
GLUCOSE SERPL-MCNC: 189 MG/DL — HIGH (ref 70–99)
GLUCOSE SERPL-MCNC: 189 MG/DL — HIGH (ref 70–99)
HCT VFR BLD CALC: 35.3 % — SIGNIFICANT CHANGE UP (ref 34.5–45)
HCT VFR BLD CALC: 35.3 % — SIGNIFICANT CHANGE UP (ref 34.5–45)
HGB BLD-MCNC: 11.5 G/DL — SIGNIFICANT CHANGE UP (ref 11.5–15.5)
HGB BLD-MCNC: 11.5 G/DL — SIGNIFICANT CHANGE UP (ref 11.5–15.5)
MAGNESIUM SERPL-MCNC: 1.8 MG/DL — SIGNIFICANT CHANGE UP (ref 1.6–2.6)
MAGNESIUM SERPL-MCNC: 1.8 MG/DL — SIGNIFICANT CHANGE UP (ref 1.6–2.6)
MCHC RBC-ENTMCNC: 29.6 PG — SIGNIFICANT CHANGE UP (ref 27–34)
MCHC RBC-ENTMCNC: 29.6 PG — SIGNIFICANT CHANGE UP (ref 27–34)
MCHC RBC-ENTMCNC: 32.6 GM/DL — SIGNIFICANT CHANGE UP (ref 32–36)
MCHC RBC-ENTMCNC: 32.6 GM/DL — SIGNIFICANT CHANGE UP (ref 32–36)
MCV RBC AUTO: 90.7 FL — SIGNIFICANT CHANGE UP (ref 80–100)
MCV RBC AUTO: 90.7 FL — SIGNIFICANT CHANGE UP (ref 80–100)
NRBC # BLD: 0 /100 WBCS — SIGNIFICANT CHANGE UP (ref 0–0)
NRBC # BLD: 0 /100 WBCS — SIGNIFICANT CHANGE UP (ref 0–0)
NRBC # FLD: 0 K/UL — SIGNIFICANT CHANGE UP (ref 0–0)
NRBC # FLD: 0 K/UL — SIGNIFICANT CHANGE UP (ref 0–0)
PHOSPHATE SERPL-MCNC: 3.2 MG/DL — SIGNIFICANT CHANGE UP (ref 2.5–4.5)
PHOSPHATE SERPL-MCNC: 3.2 MG/DL — SIGNIFICANT CHANGE UP (ref 2.5–4.5)
PLATELET # BLD AUTO: 413 K/UL — HIGH (ref 150–400)
PLATELET # BLD AUTO: 413 K/UL — HIGH (ref 150–400)
POTASSIUM SERPL-MCNC: 3.5 MMOL/L — SIGNIFICANT CHANGE UP (ref 3.5–5.3)
POTASSIUM SERPL-MCNC: 3.5 MMOL/L — SIGNIFICANT CHANGE UP (ref 3.5–5.3)
POTASSIUM SERPL-SCNC: 3.5 MMOL/L — SIGNIFICANT CHANGE UP (ref 3.5–5.3)
POTASSIUM SERPL-SCNC: 3.5 MMOL/L — SIGNIFICANT CHANGE UP (ref 3.5–5.3)
RBC # BLD: 3.89 M/UL — SIGNIFICANT CHANGE UP (ref 3.8–5.2)
RBC # BLD: 3.89 M/UL — SIGNIFICANT CHANGE UP (ref 3.8–5.2)
RBC # FLD: 13.3 % — SIGNIFICANT CHANGE UP (ref 10.3–14.5)
RBC # FLD: 13.3 % — SIGNIFICANT CHANGE UP (ref 10.3–14.5)
SODIUM SERPL-SCNC: 137 MMOL/L — SIGNIFICANT CHANGE UP (ref 135–145)
SODIUM SERPL-SCNC: 137 MMOL/L — SIGNIFICANT CHANGE UP (ref 135–145)
WBC # BLD: 10.71 K/UL — HIGH (ref 3.8–10.5)
WBC # BLD: 10.71 K/UL — HIGH (ref 3.8–10.5)
WBC # FLD AUTO: 10.71 K/UL — HIGH (ref 3.8–10.5)
WBC # FLD AUTO: 10.71 K/UL — HIGH (ref 3.8–10.5)

## 2023-12-14 PROCEDURE — 99233 SBSQ HOSP IP/OBS HIGH 50: CPT

## 2023-12-14 RX ORDER — INSULIN LISPRO 100/ML
14 VIAL (ML) SUBCUTANEOUS ONCE
Refills: 0 | Status: COMPLETED | OUTPATIENT
Start: 2023-12-14 | End: 2023-12-14

## 2023-12-14 RX ORDER — INSULIN LISPRO 100/ML
14 VIAL (ML) SUBCUTANEOUS
Refills: 0 | Status: DISCONTINUED | OUTPATIENT
Start: 2023-12-14 | End: 2023-12-17

## 2023-12-14 RX ADMIN — Medication 1: at 13:04

## 2023-12-14 RX ADMIN — BUMETANIDE 2 MILLIGRAM(S): 0.25 INJECTION INTRAMUSCULAR; INTRAVENOUS at 05:47

## 2023-12-14 RX ADMIN — Medication 1: at 18:09

## 2023-12-14 RX ADMIN — HYDROMORPHONE HYDROCHLORIDE 0.5 MILLIGRAM(S): 2 INJECTION INTRAMUSCULAR; INTRAVENOUS; SUBCUTANEOUS at 23:15

## 2023-12-14 RX ADMIN — Medication 14 UNIT(S): at 09:17

## 2023-12-14 RX ADMIN — ACETAZOLAMIDE 500 MILLIGRAM(S): 250 TABLET ORAL at 18:10

## 2023-12-14 RX ADMIN — ACETAZOLAMIDE 500 MILLIGRAM(S): 250 TABLET ORAL at 05:47

## 2023-12-14 RX ADMIN — OXYCODONE HYDROCHLORIDE 10 MILLIGRAM(S): 5 TABLET ORAL at 10:16

## 2023-12-14 RX ADMIN — OXYCODONE HYDROCHLORIDE 10 MILLIGRAM(S): 5 TABLET ORAL at 15:55

## 2023-12-14 RX ADMIN — Medication 2: at 09:17

## 2023-12-14 RX ADMIN — OXYCODONE HYDROCHLORIDE 10 MILLIGRAM(S): 5 TABLET ORAL at 09:16

## 2023-12-14 RX ADMIN — HYDROMORPHONE HYDROCHLORIDE 0.5 MILLIGRAM(S): 2 INJECTION INTRAMUSCULAR; INTRAVENOUS; SUBCUTANEOUS at 06:00

## 2023-12-14 RX ADMIN — Medication 14 UNIT(S): at 13:05

## 2023-12-14 RX ADMIN — CARVEDILOL PHOSPHATE 3.12 MILLIGRAM(S): 80 CAPSULE, EXTENDED RELEASE ORAL at 18:10

## 2023-12-14 RX ADMIN — HYDROMORPHONE HYDROCHLORIDE 0.5 MILLIGRAM(S): 2 INJECTION INTRAMUSCULAR; INTRAVENOUS; SUBCUTANEOUS at 22:34

## 2023-12-14 RX ADMIN — INSULIN GLARGINE 50 UNIT(S): 100 INJECTION, SOLUTION SUBCUTANEOUS at 22:18

## 2023-12-14 RX ADMIN — LISINOPRIL 10 MILLIGRAM(S): 2.5 TABLET ORAL at 05:47

## 2023-12-14 RX ADMIN — Medication 14 UNIT(S): at 18:09

## 2023-12-14 RX ADMIN — HYDROMORPHONE HYDROCHLORIDE 0.5 MILLIGRAM(S): 2 INJECTION INTRAMUSCULAR; INTRAVENOUS; SUBCUTANEOUS at 06:20

## 2023-12-14 RX ADMIN — OXYCODONE HYDROCHLORIDE 10 MILLIGRAM(S): 5 TABLET ORAL at 16:55

## 2023-12-14 RX ADMIN — NALOXEGOL OXALATE 25 MILLIGRAM(S): 12.5 TABLET, FILM COATED ORAL at 13:05

## 2023-12-14 RX ADMIN — SPIRONOLACTONE 25 MILLIGRAM(S): 25 TABLET, FILM COATED ORAL at 05:47

## 2023-12-14 RX ADMIN — CARVEDILOL PHOSPHATE 3.12 MILLIGRAM(S): 80 CAPSULE, EXTENDED RELEASE ORAL at 05:46

## 2023-12-14 NOTE — DISCHARGE NOTE PROVIDER - NSFOLLOWUPCLINICSTOKEN_GEN_ALL_ED_FT
[id number]: || ||00\01||False; [id number]: || ||00\01||False; [id number]: || ||00\01||False;[id number]: || ||00\01||False; [id number]: || ||00\01||False;[id number]: || ||00\01||False;

## 2023-12-14 NOTE — DISCHARGE NOTE PROVIDER - CARE PROVIDER_API CALL
BARB SINGH  [address] [place] [address],    Phone: [phone]  Fax: [phone]  Follow Up Time:    BARB SINGH  [address] [place] [address],    Phone: [phone]  Fax: [phone]  Follow Up Time:

## 2023-12-14 NOTE — DISCHARGE NOTE PROVIDER - DETAILS OF MALNUTRITION DIAGNOSIS/DIAGNOSES
This patient has been assessed with a concern for Malnutrition and was treated during this hospitalization for the following Nutrition diagnosis/diagnoses:     -  12/14/2023: Morbid obesity (BMI > 40)

## 2023-12-14 NOTE — DISCHARGE NOTE PROVIDER - NSDCFUADDAPPT_GEN_ALL_CORE_FT
Outpatient Follow-up: Patient should follow-up with his/her ophthalmologist or with [facility] Department of Ophthalmology within 1 week of after discharge at:    [address] [facility]  [place] [address]  [phone]    F/u outpatient with neurology at [address] [facility]  Outpatient Follow-up: Patient should follow-up with his/her ophthalmologist or with [facility] Department of Ophthalmology within 1 week of after discharge at:    [address] [facility]  [place] [address]  [phone]    F/u outpatient with neurology at [address] [facility]  Outpatient Follow-up: Patient should follow-up with his/her ophthalmologist or with [facility] Department of Ophthalmology within 1 week of after discharge at:    [address] [facility]  [place] [address]  [phone]    F/u outpatient with neurology at [address] [facility]     Dr. Burroughs Headache neurology specialist Outpatient Follow-up: Patient should follow-up with his/her ophthalmologist or with [facility] Department of Ophthalmology within 1 week of after discharge at:    [address] [facility]  [place] [address]  [phone]    F/u outpatient with neurology at [address] [facility]     Dr. Burroughs Headache neurology specialist Outpatient Follow-up: Patient should follow-up with his/her ophthalmologist or with [facility] Department of Ophthalmology within 1 week of after discharge at:    [address] [facility]  [place] [address]  [phone]    F/u outpatient with neurology at [address] [facility]     Dr. Burroughs Headache neurology specialist. Outpatient Follow-up: Patient should follow-up with his/her ophthalmologist or with [facility] Department of Ophthalmology within 1 week of after discharge at:    [address] [facility]  [place] [address]  [phone]    F/u outpatient with neurology at [address] [facility]     Dr. Burroughs Headache neurology specialist.

## 2023-12-14 NOTE — PROGRESS NOTE ADULT - PROBLEM SELECTOR PLAN 1
Patient reports to have band-like headache, tinnitus, blurry vision, and photophobia, consistent with her previous episode of idiopathic intracranial HTN /pseudotumor cerebri   -Neurology and ophtho consulted, recs appreciated   -ED attempted to perform LP 10X but was not successful, Had LP on 12/11, opening pressure measured 11 cm water. Given OP is not elevated, patient's episodes of transient vision loss cannot be attributed to elevated ICP. Recommend MRI brain and orbits with and without contrast and MRV. Appreciate neuro eval. Please continue diamox   -f/u neuro recs, called Neuro as well   -CT head and venogram is negative for any acute pathology   -C/w Diamox  mg BID   -Unable to get MRI/MRV brain, MRI orbits w/wo contrast as pt with neurostimulator which is not MRI compatible.   -Anesthesia consulted for possible blood patch, f/u anesthesia recs  -Pain control  -mild leukocytosis - cont to monitor for now

## 2023-12-14 NOTE — PHYSICAL THERAPY INITIAL EVALUATION ADULT - MANUAL MUSCLE TESTING RESULTS, REHAB EVAL
Patients bilateral upper extremity strength grossly 4/5, bilateral lower extremity strength grossly 3+/5 upon MMT and functional assessments

## 2023-12-14 NOTE — DIETITIAN INITIAL EVALUATION ADULT - PERTINENT MEDS FT
MEDICATIONS  (STANDING):  acetaZOLAMIDE    Tablet 500 milliGRAM(s) Oral two times a day  buMETAnide 2 milliGRAM(s) Oral daily  carvedilol 3.125 milliGRAM(s) Oral every 12 hours  dextrose 5%. 1000 milliLiter(s) (50 mL/Hr) IV Continuous <Continuous>  dextrose 5%. 1000 milliLiter(s) (100 mL/Hr) IV Continuous <Continuous>  dextrose 50% Injectable 25 Gram(s) IV Push once  dextrose 50% Injectable 25 Gram(s) IV Push once  dextrose 50% Injectable 12.5 Gram(s) IV Push once  glucagon  Injectable 1 milliGRAM(s) IntraMuscular once  insulin glargine Injectable (LANTUS) 50 Unit(s) SubCutaneous at bedtime  insulin lispro (ADMELOG) corrective regimen sliding scale   SubCutaneous at bedtime  insulin lispro (ADMELOG) corrective regimen sliding scale   SubCutaneous three times a day before meals  insulin lispro Injectable (ADMELOG) 14 Unit(s) SubCutaneous three times a day before meals  lisinopril 10 milliGRAM(s) Oral daily  naloxegol 25 milliGRAM(s) Oral daily  spironolactone 25 milliGRAM(s) Oral daily    MEDICATIONS  (PRN):  acetaminophen     Tablet .. 650 milliGRAM(s) Oral every 6 hours PRN Temp greater or equal to 38C (100.4F), Mild Pain (1 - 3)  acetaminophen   IVPB .. 1000 milliGRAM(s) IV Intermittent once PRN Mild Pain (1 - 3)  aluminum hydroxide/magnesium hydroxide/simethicone Suspension 30 milliLiter(s) Oral every 4 hours PRN Dyspepsia  baclofen 5 milliGRAM(s) Oral every 8 hours PRN Muscle Spasm  dextrose Oral Gel 15 Gram(s) Oral once PRN Blood Glucose LESS THAN 70 milliGRAM(s)/deciliter  HYDROmorphone  Injectable 0.5 milliGRAM(s) IV Push every 8 hours PRN Severe Pain (7 - 10)  melatonin 3 milliGRAM(s) Oral at bedtime PRN Insomnia  ondansetron Injectable 4 milliGRAM(s) IV Push every 8 hours PRN Nausea and/or Vomiting  oxyCODONE    IR 10 milliGRAM(s) Oral every 6 hours PRN Moderate Pain (4 - 6)

## 2023-12-14 NOTE — PHYSICAL THERAPY INITIAL EVALUATION ADULT - GROSSLY INTACT, SENSORY
patient reports numbness and tingling in all four extremities due to chronic neuropathy/Left UE/Right UE/Left LE/Right LE/Grossly Intact

## 2023-12-14 NOTE — PROGRESS NOTE ADULT - PROBLEM SELECTOR PLAN 3
-HgbA1c 7.5  -Cont lantus to 50 units qhs, increase admelog to 14 units TID, will monitor and adjust insulin   -C/w ISS   -monitor fingersticks show

## 2023-12-14 NOTE — DISCHARGE NOTE PROVIDER - NSDCFUSCHEDAPPT_GEN_ALL_CORE_FT
Davin Salas  [facility]  P[facility] [address]  Scheduled Appointment: 01/10/2024     Davin Salas  [facility]  P[facility] [address]  Scheduled Appointment: 01/10/2024

## 2023-12-14 NOTE — DISCHARGE NOTE PROVIDER - NSFOLLOWUPCLINICS_GEN_ALL_ED_FT
[facility]  Endocrinology  [address] [place] [address]  Phone: [phone]  Fax:      [facility] Endocrinology  Endocrinology  [address] [place] [address]  Phone: [phone]  Fax:      [facility] Endocrinology  Endocrinology  [address] [place] [address]  Phone: [phone]  Fax:     [facility]  Neurology Autoimmune Encephalitis  [address] [place] [address]  Phone: [phone]  Fax: [phone]     [facility] Endocrinology  Endocrinology  [address] [place] [address]  Phone: [phone]  Fax:     [facility]  Neurology Autoimmune Encephalitis  [address] [place] [address]  Phone: [phone]  Fax: [phone]

## 2023-12-14 NOTE — DIETITIAN INITIAL EVALUATION ADULT - OTHER INFO
Nutrition assessment for length of stay.    12/13 Hospitalist Attending -  52 yo F with Pmhx of CHF, HTN, HLD, DM, Idiopathic intracranial hypertension (diagnosed 2020), chronic back pain (intercostal neuralgia/complex regional pain syndrome) s/p spinal stimulator presents to the ED with worsening headache, most likely due to Pseudotumor cerebri.    Patient eating at time of visit, with good observed meal intake.  No chewing or swallowing difficulties reported. No GI distress reported i.e. nausea, vomiting, diarrhea. No food allergies noted or reported. Patient previously educated and with good understanding of diet restrictions  stated she was following heart healthy / consistent cho diet PTA.  Reinforced diet education with patient - focused on low sodium and plate method. Weight (adm) 160.1kg 12/8; current weight 12/13 - 136.4kg. Continue to trend inpatient weights.  Patient noted with chronic CHF and indicated h/o weight changes due to fluid status and diuretic therapy.

## 2023-12-14 NOTE — DISCHARGE NOTE PROVIDER - NSDCMRMEDTOKEN_GEN_ALL_CORE_FT
baclofen 5 mg oral tablet: 1 tab(s) orally every 8 hours, As needed, Muscle Spasm  bumetanide 2 mg oral tablet: 1 tab(s) orally once a day  carvedilol 3.125 mg oral tablet: 1 tab(s) orally every 12 hours  Diamox 250 mg oral tablet: 1 tab(s) orally 2 times a day  DULoxetine 30 mg oral delayed release capsule: 1 cap(s) orally 2 times a day  lisinopril 10 mg oral tablet: 1 tab(s) orally once a day  metFORMIN 1000 mg oral tablet: 1 tab(s) orally 2 times a day  naloxegol 25 mg oral tablet: 1 tab(s) orally once a day  NovoLOG 100 units/mL subcutaneous solution: 12 unit(s) subcutaneous 3 times a day  oxyCODONE 20 mg oral tablet: 1 tab(s) orally 2 times a day  pantoprazole 40 mg oral delayed release tablet: 1 tab(s) orally once a day (before a meal)  pregabalin 100 mg oral capsule: 1 cap(s) orally 3 times a day  semaglutide 0.25 mg/0.5 mL (0.25 mg dose) subcutaneous solution: 0.25 milligram(s) subcutaneously once a week  spironolactone 25 mg oral tablet: 1 tab(s) orally once a day  Tresiba FlexTouch 200 units/mL subcutaneous solution: 45 unit(s) subcutaneous once a day (at bedtime)   acetaZOLAMIDE 250 mg oral tablet: 2 tab(s) orally 2 times a day  atorvastatin 20 mg oral tablet: 1 tab(s) orally once a day (at bedtime)  baclofen 5 mg oral tablet: 1 tab(s) orally every 8 hours, As needed, Muscle Spasm  bumetanide 2 mg oral tablet: 1 tab(s) orally once a day  butalbital/acetaminophen/caffeine 50 mg-325 mg-40 mg oral tablet: 2 tab(s) orally every 8 hours as needed for migraine  carvedilol 3.125 mg oral tablet: 1 tab(s) orally every 12 hours  DULoxetine 30 mg oral delayed release capsule: 1 cap(s) orally 2 times a day  ergocalciferol 1.25 mg (50,000 intl units) oral capsule: 1 cap(s) orally every 7 days take on same day weekly  lisinopril 10 mg oral tablet: 1 tab(s) orally once a day  melatonin 3 mg oral tablet: 2 tab(s) orally once a day (at bedtime) As needed Sleep  metFORMIN 1000 mg oral tablet: 1 tab(s) orally 2 times a day  naloxegol 25 mg oral tablet: 1 tab(s) orally once a day  NovoLOG FlexPen 100 units/mL injectable solution: 18 unit(s) subcutaneous 3 times a day (before meals) unit(s) subcutaneous 3 times a day (with meals)  oxyCODONE 10 mg oral tablet: 1 tab(s) orally every 8 hours as needed for  moderate to severe pain MDD: 3 tabs  pantoprazole 40 mg oral delayed release tablet: 1 tab(s) orally once a day (before a meal)  pregabalin 100 mg oral capsule: 1 cap(s) orally 3 times a day  semaglutide 0.25 mg/0.5 mL (0.25 mg dose) subcutaneous solution: 0.25 milligram(s) subcutaneously once a week  spironolactone 25 mg oral tablet: 1 tab(s) orally once a day  Tresiba FlexTouch 100 units/mL subcutaneous solution: 50 unit(s) subcutaneous once a day (at bedtime)   acetaZOLAMIDE 250 mg oral tablet: 2 tab(s) orally 2 times a day  atorvastatin 20 mg oral tablet: 1 tab(s) orally once a day (at bedtime)  baclofen 5 mg oral tablet: 1 tab(s) orally every 8 hours, As needed, Muscle Spasm  bumetanide 2 mg oral tablet: 1 tab(s) orally once a day  butalbital/acetaminophen/caffeine 50 mg-325 mg-40 mg oral tablet: 2 tab(s) orally every 8 hours as needed for migraine  carvedilol 3.125 mg oral tablet: 1 tab(s) orally every 12 hours  DULoxetine 30 mg oral delayed release capsule: 1 cap(s) orally 2 times a day  ergocalciferol 1.25 mg (50,000 intl units) oral capsule: 1 cap(s) orally every 7 days take on same day weekly  lisinopril 10 mg oral tablet: 1 tab(s) orally once a day  melatonin 3 mg oral tablet: 2 tab(s) orally once a day (at bedtime) As needed Sleep  metFORMIN 1000 mg oral tablet: 1 tab(s) orally 2 times a day  naloxegol 25 mg oral tablet: 1 tab(s) orally once a day  NovoLOG FlexPen 100 units/mL injectable solution: 18 unit(s) subcutaneous 3 times a day (before meals) unit(s) subcutaneous 3 times a day (with meals)  oxyCODONE 10 mg oral tablet: 1 tab(s) orally every 8 hours as needed for  moderate to severe pain MDD: 3 tabs  Ozempic 2 mg/3 mL (0.25 mg or 0.5 mg dose) subcutaneous solution: 0.5 milligram(s) subcutaneously once a week  pantoprazole 40 mg oral delayed release tablet: 1 tab(s) orally once a day (before a meal)  pregabalin 100 mg oral capsule: 1 cap(s) orally 3 times a day  semaglutide 0.25 mg/0.5 mL (0.25 mg dose) subcutaneous solution: 0.25 milligram(s) subcutaneously once a week  spironolactone 25 mg oral tablet: 1 tab(s) orally once a day  Tresiba FlexTouch 100 units/mL subcutaneous solution: 50 unit(s) subcutaneous once a day (at bedtime)   acetaZOLAMIDE 250 mg oral tablet: 2 tab(s) orally 2 times a day  atorvastatin 20 mg oral tablet: 1 tab(s) orally once a day (at bedtime)  baclofen 5 mg oral tablet: 1 tab(s) orally every 8 hours, As needed, Muscle Spasm  bumetanide 2 mg oral tablet: 1 tab(s) orally once a day  butalbital/acetaminophen/caffeine 50 mg-325 mg-40 mg oral tablet: 2 tab(s) orally every 8 hours as needed for migraine  carvedilol 3.125 mg oral tablet: 1 tab(s) orally every 12 hours  diphenhydrAMINE 50 mg oral capsule: 1 cap(s) orally every 8 hours as needed for Rash and/or Itching  DULoxetine 30 mg oral delayed release capsule: 1 cap(s) orally 2 times a day  ergocalciferol 1.25 mg (50,000 intl units) oral capsule: 1 cap(s) orally every 7 days take on same day weekly  ketorolac 10 mg oral tablet: 1 tab(s) orally every 8 hours MDD: 3 tab  lisinopril 5 mg oral tablet: 1 tab(s) orally once a day  melatonin 3 mg oral tablet: 2 tab(s) orally once a day (at bedtime) As needed Sleep  metFORMIN 1000 mg oral tablet: 1 tab(s) orally 2 times a day  metoclopramide 10 mg oral tablet: 1 tab(s) orally every 8 hours  naloxegol 25 mg oral tablet: 1 tab(s) orally once a day  NovoLOG FlexPen 100 units/mL injectable solution: 15 unit(s) subcutaneous 3 times a day (before meals) unit(s) subcutaneous 3 times a day (with meals)  oxyCODONE 15 mg oral tablet: 1 tab(s) orally every 6 hours as needed for Moderate Pain (4 - 6) MDD: 4 tabs  Ozempic 2 mg/3 mL (0.25 mg or 0.5 mg dose) subcutaneous solution: 0.5 milligram(s) subcutaneously once a week  pantoprazole 40 mg oral delayed release tablet: 1 tab(s) orally once a day (before a meal)  pregabalin 100 mg oral capsule: 1 cap(s) orally 3 times a day  spironolactone 25 mg oral tablet: 1 tab(s) orally once a day  Tresiba FlexTouch 100 units/mL subcutaneous solution: 54 unit(s) subcutaneous once a day (at bedtime)   acetaZOLAMIDE 250 mg oral tablet: 2 tab(s) orally 2 times a day  atorvastatin 20 mg oral tablet: 1 tab(s) orally once a day (at bedtime)  baclofen 5 mg oral tablet: 1 tab(s) orally every 8 hours, As needed, Muscle Spasm  bumetanide 2 mg oral tablet: 1 tab(s) orally once a day  butalbital/acetaminophen/caffeine 50 mg-325 mg-40 mg oral tablet: 2 tab(s) orally every 8 hours as needed for migraine  carvedilol 3.125 mg oral tablet: 1 tab(s) orally every 12 hours  diphenhydrAMINE 50 mg oral capsule: 1 cap(s) orally every 8 hours as needed for headache  DULoxetine 30 mg oral delayed release capsule: 1 cap(s) orally 2 times a day  ergocalciferol 1.25 mg (50,000 intl units) oral capsule: 1 cap(s) orally every 7 days take on same day weekly  ketorolac 10 mg oral tablet: 1 tab(s) orally every 8 hours MDD: 3 tab  lisinopril 5 mg oral tablet: 1 tab(s) orally once a day  melatonin 3 mg oral tablet: 2 tab(s) orally once a day (at bedtime) As needed Sleep  metFORMIN 1000 mg oral tablet: 1 tab(s) orally 2 times a day  metoclopramide 10 mg oral tablet: 1 tab(s) orally every 8 hours  naloxegol 25 mg oral tablet: 1 tab(s) orally once a day  NovoLOG FlexPen 100 units/mL injectable solution: 15 unit(s) subcutaneous 3 times a day (before meals) unit(s) subcutaneous 3 times a day (with meals)  oxyCODONE 15 mg oral tablet: 1 tab(s) orally every 6 hours as needed for Moderate Pain (4 - 6) MDD: 4 tabs  Ozempic 2 mg/3 mL (0.25 mg or 0.5 mg dose) subcutaneous solution: 0.5 milligram(s) subcutaneously once a week  pantoprazole 40 mg oral delayed release tablet: 1 tab(s) orally once a day (before a meal)  pregabalin 100 mg oral capsule: 1 cap(s) orally 3 times a day  spironolactone 25 mg oral tablet: 1 tab(s) orally once a day  Tresiba FlexTouch 100 units/mL subcutaneous solution: 54 unit(s) subcutaneous once a day (at bedtime)   acetaZOLAMIDE 250 mg oral tablet: 2 tab(s) orally 2 times a day  atorvastatin 20 mg oral tablet: 1 tab(s) orally once a day (at bedtime)  baclofen 5 mg oral tablet: 1 tab(s) orally every 8 hours, As needed, Muscle Spasm  bumetanide 2 mg oral tablet: 1 tab(s) orally once a day  butalbital/acetaminophen/caffeine 50 mg-325 mg-40 mg oral tablet: 2 tab(s) orally every 8 hours as needed for migraine  carvedilol 3.125 mg oral tablet: 1 tab(s) orally every 12 hours  diphenhydrAMINE 50 mg oral capsule: 1 cap(s) orally every 8 hours as needed for headache  DULoxetine 30 mg oral delayed release capsule: 1 cap(s) orally 2 times a day  ergocalciferol 1.25 mg (50,000 intl units) oral capsule: 1 cap(s) orally every 7 days take on same day weekly  Insulin Pen Needles, 4mm: 1 application subcutaneously 4 times a day. ** Use with insulin pen **  ketorolac 10 mg oral tablet: 1 tab(s) orally every 8 hours MDD: 3 tab  lisinopril 5 mg oral tablet: 1 tab(s) orally once a day  melatonin 3 mg oral tablet: 2 tab(s) orally once a day (at bedtime) As needed Sleep  metFORMIN 1000 mg oral tablet: 1 tab(s) orally 2 times a day  metoclopramide 10 mg oral tablet: 1 tab(s) orally every 8 hours  naloxegol 25 mg oral tablet: 1 tab(s) orally once a day  NovoLOG FlexPen 100 units/mL injectable solution: 15 unit(s) subcutaneous 3 times a day (before meals) unit(s) subcutaneous 3 times a day (with meals)  oxyCODONE 15 mg oral tablet: 1 tab(s) orally every 6 hours as needed for Moderate Pain (4 - 6) MDD: 4 tabs  Ozempic 2 mg/3 mL (0.25 mg or 0.5 mg dose) subcutaneous solution: 0.5 milligram(s) subcutaneously once a week  pantoprazole 40 mg oral delayed release tablet: 1 tab(s) orally once a day (before a meal)  pregabalin 100 mg oral capsule: 1 cap(s) orally 3 times a day  spironolactone 25 mg oral tablet: 1 tab(s) orally once a day  Tresiba FlexTouch 100 units/mL subcutaneous solution: 54 unit(s) subcutaneous once a day (at bedtime)

## 2023-12-14 NOTE — PROGRESS NOTE ADULT - SUBJECTIVE AND OBJECTIVE BOX
Called to evaluate this patient with history of pseudotumor admitted one week ago with HA.   In years past, she describes having had an LP and receiving good relief for years.   On this admission, she had an LP this past Monday (5 days after admission).  The LP was first attempted in the ED, however, they were unable to reach the thecal sac after many attempts.   Finally, the patient reports, the LP was done w/imaging guidance.  Since that time, the HA is now worse on sitting-- a component to the HA that did not exist before.  She had a spinal cord stimulator implanted this past March at an outside hospital.    On physical exam, she has a midline scar on her back in a high lumbar/low thoracic level.    Impression:  Probable PDPH.  A blood patch may help alleviate the symptoms associated with standing/sitting, although it will not address the underlying headache which she says is still there as well.  Discussed with IDALMIS the concerns re the stimulator.  Unsure what has been implanted, at what level it is at.   Given the uncertainties of position of the device as well as the difficulty in LP, may consider EBP under fluoro guidance.    Thank you for the courtesy of this consultation.  As discussed with HO, clarification of the spinal device is needed.  Given the device (and difficulty in LP ) a  radiologically-assisted EBP may be a consideration.    Thank you for the courtesy of this consultation.    G. Palleschi, MD

## 2023-12-14 NOTE — PHYSICAL THERAPY INITIAL EVALUATION ADULT - GENERAL OBSERVATIONS, REHAB EVAL
Patient found semi-reclined in bed, NAD, A&Ox4, complaints of pain, but agreeable to participate in skilled PT evaluation, /60

## 2023-12-14 NOTE — DIETITIAN INITIAL EVALUATION ADULT - PERTINENT LABORATORY DATA
12-14    137  |  106  |  18  ----------------------------<  189<H>  3.5   |  18<L>  |  0.81    Ca    8.6      14 Dec 2023 06:02  Phos  3.2     12-14  Mg     1.80     12-14    A1C with Estimated Average Glucose Result: 7.5 % (12-09-23 @ 05:12)    CAPILLARY BLOOD GLUCOSE  POCT Blood Glucose.: 203 mg/dL (14 Dec 2023 09:03)  POCT Blood Glucose.: 364 mg/dL (13 Dec 2023 21:55)  POCT Blood Glucose.: 293 mg/dL (13 Dec 2023 17:43)  POCT Blood Glucose.: 329 mg/dL (13 Dec 2023 13:45)  POCT Blood Glucose.: 277 mg/dL (13 Dec 2023 12:21)

## 2023-12-14 NOTE — PHYSICAL THERAPY INITIAL EVALUATION ADULT - GAIT PATTERN USED, PT EVAL
patient deferred further gait training due to headache, patient see ambulating independently approximately 45 minutes later with a cane to the nurses station in NAD/swing-to gait

## 2023-12-14 NOTE — PHYSICAL THERAPY INITIAL EVALUATION ADULT - PERTINENT HX OF CURRENT PROBLEM, REHAB EVAL
Patient is a 53 year old female with PMHx of CHF, HTN, HLD, DM, Idiopathic intracranial hypertension (diagnosed 2020), chronic back pain (intercostal neuralgia/complex regional pain syndrome) s/p spinal stimulator presents to the ED with worsening headache, most likely due to Pseudotumor cerebri. CT head and venogram is negative for any acute pathology.

## 2023-12-14 NOTE — PHYSICAL THERAPY INITIAL EVALUATION ADULT - ADDITIONAL COMMENTS
As per patient she lives alone in an apartment, no steps to enter (+) elevator access. Patient has a rolling walker, cane and wheelchair. Daughter works as her home health aide ~29 hours a week.     Patient left sitting in chair at bedside, NAD, all lines and tubes intact, call bell within reach, with instruction to not get up on her own, TERESA Mansfield made aware

## 2023-12-14 NOTE — PROGRESS NOTE ADULT - PROBLEM SELECTOR PLAN 2
Chronic HFpEF, euvolemic on exam   -Not in acute exacerbation   -c/w bumex 2 mg PO daily   -c/w lisinopril 10 mg   -c/w spironolactone 25 mg daily  -c/w coreg 12.5mg q12h

## 2023-12-14 NOTE — PROGRESS NOTE ADULT - SUBJECTIVE AND OBJECTIVE BOX
Patient is a 53y old  Female who presents with a chief complaint of Headache     (14 Dec 2023 10:25)      SUBJECTIVE / OVERNIGHT EVENTS: Pt seen and examined at 12N, no overnight events, still c/o headache says that is is worse after she got the LP, is upset that she is still waiting for MRI. siting in a chair. No other complaints, no other new issues reported.        MEDICATIONS  (STANDING):  acetaZOLAMIDE    Tablet 500 milliGRAM(s) Oral two times a day  buMETAnide 2 milliGRAM(s) Oral daily  carvedilol 3.125 milliGRAM(s) Oral every 12 hours  dextrose 5%. 1000 milliLiter(s) (100 mL/Hr) IV Continuous <Continuous>  dextrose 5%. 1000 milliLiter(s) (50 mL/Hr) IV Continuous <Continuous>  dextrose 50% Injectable 25 Gram(s) IV Push once  dextrose 50% Injectable 12.5 Gram(s) IV Push once  dextrose 50% Injectable 25 Gram(s) IV Push once  glucagon  Injectable 1 milliGRAM(s) IntraMuscular once  insulin glargine Injectable (LANTUS) 50 Unit(s) SubCutaneous at bedtime  insulin lispro (ADMELOG) corrective regimen sliding scale   SubCutaneous at bedtime  insulin lispro (ADMELOG) corrective regimen sliding scale   SubCutaneous three times a day before meals  insulin lispro Injectable (ADMELOG) 14 Unit(s) SubCutaneous three times a day before meals  lisinopril 10 milliGRAM(s) Oral daily  naloxegol 25 milliGRAM(s) Oral daily  spironolactone 25 milliGRAM(s) Oral daily    MEDICATIONS  (PRN):  acetaminophen     Tablet .. 650 milliGRAM(s) Oral every 6 hours PRN Temp greater or equal to 38C (100.4F), Mild Pain (1 - 3)  acetaminophen   IVPB .. 1000 milliGRAM(s) IV Intermittent once PRN Mild Pain (1 - 3)  aluminum hydroxide/magnesium hydroxide/simethicone Suspension 30 milliLiter(s) Oral every 4 hours PRN Dyspepsia  baclofen 5 milliGRAM(s) Oral every 8 hours PRN Muscle Spasm  dextrose Oral Gel 15 Gram(s) Oral once PRN Blood Glucose LESS THAN 70 milliGRAM(s)/deciliter  HYDROmorphone  Injectable 0.5 milliGRAM(s) IV Push every 8 hours PRN Severe Pain (7 - 10)  melatonin 3 milliGRAM(s) Oral at bedtime PRN Insomnia  ondansetron Injectable 4 milliGRAM(s) IV Push every 8 hours PRN Nausea and/or Vomiting  oxyCODONE    IR 10 milliGRAM(s) Oral every 6 hours PRN Moderate Pain (4 - 6)      Vital Signs Last 24 Hrs  T(C): 37.1 (14 Dec 2023 10:16), Max: 37.1 (14 Dec 2023 10:16)  T(F): 98.7 (14 Dec 2023 10:16), Max: 98.7 (14 Dec 2023 10:16)  HR: 74 (14 Dec 2023 10:16) (64 - 74)  BP: 119/61 (14 Dec 2023 10:16) (117/65 - 119/61)  BP(mean): --  RR: 17 (14 Dec 2023 10:16) (17 - 18)  SpO2: 98% (14 Dec 2023 10:16) (98% - 100%)    Parameters below as of 14 Dec 2023 10:16  Patient On (Oxygen Delivery Method): room air      CAPILLARY BLOOD GLUCOSE      POCT Blood Glucose.: 181 mg/dL (14 Dec 2023 12:18)  POCT Blood Glucose.: 203 mg/dL (14 Dec 2023 09:03)  POCT Blood Glucose.: 364 mg/dL (13 Dec 2023 21:55)  POCT Blood Glucose.: 293 mg/dL (13 Dec 2023 17:43)    I&O's Summary    13 Dec 2023 07:01  -  14 Dec 2023 07:00  --------------------------------------------------------  IN: 1920 mL / OUT: 3750 mL / NET: -1830 mL    14 Dec 2023 07:01  -  14 Dec 2023 16:15  --------------------------------------------------------  IN: 0 mL / OUT: 1500 mL / NET: -1500 mL        PHYSICAL EXAM:  GENERAL: NAD  CHEST/LUNG: Clear to auscultation bilaterally; No wheeze  HEART: Regular rate and rhythm  ABDOMEN: Soft, Nontender, Nondistended  EXTREMITIES: no LE edema  PSYCH: Calm  NEUROLOGY: AAOx3  SKIN: No rashes or lesions    LABS:                        11.5   10.71 )-----------( 413      ( 14 Dec 2023 06:02 )             35.3     12-14    137  |  106  |  18  ----------------------------<  189<H>  3.5   |  18<L>  |  0.81    Ca    8.6      14 Dec 2023 06:02  Phos  3.2     12-14  Mg     1.80     12-14            Urinalysis Basic - ( 14 Dec 2023 06:02 )    Color: x / Appearance: x / SG: x / pH: x  Gluc: 189 mg/dL / Ketone: x  / Bili: x / Urobili: x   Blood: x / Protein: x / Nitrite: x   Leuk Esterase: x / RBC: x / WBC x   Sq Epi: x / Non Sq Epi: x / Bacteria: x        RADIOLOGY & ADDITIONAL TESTS:    Imaging Personally Reviewed:    Consultant(s) Notes Reviewed:      Care Discussed with Consultants/Other Providers:

## 2023-12-14 NOTE — DISCHARGE NOTE PROVIDER - HOSPITAL COURSE
54 yo F with Pmhx of CHF, HTN, HLD, DM, Idiopathic intracranial hypertension (diagnosed 2020), chronic back pain (intercostal neuralgia/complex regional pain syndrome) s/p spinal stimulator presents to the ED with worsening headache, most likely due to Pseudotumor cerebri    Pseudotumor cerebri.    Patient reports to have band-like headache, tinnitus, blurry vision, and photophobia, consistent with her previous episode of idiopathic intracranial HTN /pseudotumor cerebri   -Neurology and ophtho consulted, recs appreciated   -ED attempted to perform LP 10X but was not successful, Had LP on 12/11, opening pressure measured 11 cm water. Given OP is not elevated, patient's episodes of transient vision loss cannot be attributed to elevated ICP. Recommend MRI brain and orbits with and without contrast and MRV. Appreciate neuro eval. Please continue diamox   -f/u neuro recs, called Neuro as well   -CT head and venogram is negative for any acute pathology   -C/w Diamox  mg BID   -Unable to get MRI/MRV brain, MRI orbits w/wo contrast as pt with neurostimulator which is not MRI compatible.   -Anesthesia consulted for possible blood patch, f/u anesthesia recs  -Pain control  -mild leukocytosis - cont to monitor for now.    Chronic CHF.   ·  Plan: Chronic HFpEF, euvolemic on exam   -Not in acute exacerbation   -c/w bumex 2 mg PO daily   -c/w lisinopril 10 mg   -c/w spironolactone 25 mg daily  -c/w coreg 12.5mg q12h.    Type 2 diabetes mellitus.   ·  Plan: -HgbA1c 7.5  -Cont lantus to 50 units qhs, increase admelog to 14 units TID, will monitor and adjust insulin   -C/w ISS   -monitor fingersticks.     Essential hypertension.   ·  Plan: -C/w lisinopril 10 mg daily   -C/w spironolactone 25 mg daily   --started on coreg 12.5mg q12h  -monitor BP.     52 yo F with Pmhx of CHF, HTN, HLD, DM, Idiopathic intracranial hypertension (diagnosed 2020), chronic back pain (intercostal neuralgia/complex regional pain syndrome) s/p spinal stimulator presents to the ED with worsening headache, most likely due to Pseudotumor cerebri    Pseudotumor cerebri.    Patient reports to have band-like headache, tinnitus, blurry vision, and photophobia, consistent with her previous episode of idiopathic intracranial HTN /pseudotumor cerebri   -Neurology and ophtho consulted, recs appreciated   -ED attempted to perform LP 10X but was not successful, Had LP on 12/11, opening pressure measured 11 cm water. Given OP is not elevated, patient's episodes of transient vision loss cannot be attributed to elevated ICP. Recommend MRI brain and orbits with and without contrast and MRV. Appreciate neuro eval. Please continue diamox   -f/u neuro recs, called Neuro as well   -CT head and venogram is negative for any acute pathology   -C/w Diamox  mg BID   -Unable to get MRI/MRV brain, MRI orbits w/wo contrast as pt with neurostimulator which is not MRI compatible.   -Anesthesia consulted for possible blood patch, f/u anesthesia recs  -Pain control  -mild leukocytosis - cont to monitor for now.    Chronic CHF.   ·  Plan: Chronic HFpEF, euvolemic on exam   -Not in acute exacerbation   -c/w bumex 2 mg PO daily   -c/w lisinopril 10 mg   -c/w spironolactone 25 mg daily  -c/w coreg 12.5mg q12h.    Type 2 diabetes mellitus.   ·  Plan: -HgbA1c 7.5  -Cont lantus to 50 units qhs, increase admelog to 14 units TID, will monitor and adjust insulin   -C/w ISS   -monitor fingersticks.     Essential hypertension.   ·  Plan: -C/w lisinopril 10 mg daily   -C/w spironolactone 25 mg daily   --started on coreg 12.5mg q12h  -monitor BP.     52 yo F with Pmhx of CHF, HTN, HLD, DM, Idiopathic intracranial hypertension (diagnosed 2020), chronic back pain (intercostal neuralgia/complex regional pain syndrome) s/p spinal stimulator presents to the ED with worsening headache, most likely due to Pseudotumor cerebri    Pseudotumor cerebri.    Patient reports to have band-like headache, tinnitus, blurry vision, and photophobia, consistent with her previous episode of idiopathic intracranial HTN /pseudotumor cerebri   -Neurology and ophtho consulted, recs appreciated   -ED attempted to perform LP 10X but was not successful, Had LP on 12/11, opening pressure measured 11 cm water. Given OP is not elevated, patient's episodes of transient vision loss cannot be attributed to elevated ICP. Recommend MRI brain and orbits with and without contrast and MRV. Appreciate neuro eval. Please continue diamox   -f/u neuro recs, called Neuro as well   -CT head and venogram is negative for any acute pathology   -C/w Diamox  mg BID   -Unable to get MRI/MRV brain, MRI orbits w/wo contrast as pt with neurostimulator which is not MRI compatible.   -Anesthesia consulted for possible blood patch, IR consult for EBP with fluoro guidance, per neuro ct head with contrast-------------  -Pain control  -mild leukocytosis - cont to monitor for now.    Chronic CHF.   ·  Plan: Chronic HFpEF, euvolemic on exam   -Not in acute exacerbation   -c/w bumex 2 mg PO daily   -c/w lisinopril 10 mg   -c/w spironolactone 25 mg daily  -c/w coreg 12.5mg q12h.    Type 2 diabetes mellitus.   ·  Plan: -HgbA1c 7.5  -Cont lantus to 50 units qhs, increase admelog to 14 units TID, will monitor and adjust insulin   -C/w ISS   -monitor fingersticks.     Essential hypertension.   ·  Plan: -C/w lisinopril 10 mg daily   -C/w spironolactone 25 mg daily   --started on coreg 12.5mg q12h  -monitor BP.     54 yo F with Pmhx of CHF, HTN, HLD, DM, Idiopathic intracranial hypertension (diagnosed 2020), chronic back pain (intercostal neuralgia/complex regional pain syndrome) s/p spinal stimulator presents to the ED with worsening headache, most likely due to Pseudotumor cerebri    Pseudotumor cerebri.    Patient reports to have band-like headache, tinnitus, blurry vision, and photophobia, consistent with her previous episode of idiopathic intracranial HTN /pseudotumor cerebri   -CT head and venogram is negative for any acute pathology   -Unable to get MRI/MRV brain, MRI orbits w/wo contrast as pt with neurostimulator which is not MRI compatible.  -ED attempted to perform LP 10X but was not successful, Had LP on 12/11 with IR, opening pressure measured 11 cm water.   -Post LP pt has worse headache- neuro rec blood patch.   -anesthesia rec CTH and IR consult for blood path if imaging shows signs of intracranial hypotension   -Head ct with contrast 12/16: Expanded sella turcica with CSF attenuation due to near-complete empty sella versus underlying arachnoid cyst. Findings may be seen in the setting of intracranial hypertension.  -case discussed with neurology- will start trial of migraine cocktail- toradol, reglan. also on Esgic prn and oxy  -c/w diamox (dose increased)     Chronic CHF.   ·  Plan: Chronic HFpEF, euvolemic on exam   -Not in acute exacerbation   -c/w bumex 2 mg PO daily   -c/w lisinopril 10 mg   -c/w spironolactone 25 mg daily  -c/w coreg 12.5mg q12h.    Type 2 diabetes mellitus.   ·  Plan: -HgbA1c 7.5  -Cont lantus to 50 units qhs, increase admelog to 14 units TID, will monitor and adjust insulin   -C/w ISS   -monitor fingersticks.     Essential hypertension.   ·  Plan: -C/w lisinopril 10 mg daily   -C/w spironolactone 25 mg daily   --started on coreg 12.5mg q12h  -monitor BP.     54 yo F with Pmhx of CHF, HTN, HLD, DM, Idiopathic intracranial hypertension (diagnosed 2020), chronic back pain (intercostal neuralgia/complex regional pain syndrome) s/p spinal stimulator presents to the ED with worsening headache    Headache, likely complex migraine   -CT head and venogram is negative for any acute pathology   -Unable to get MRI/MRV brain, MRI orbits w/wo contrast as pt with neurostimulator which is not MRI compatible.  -s/p LP on 12/11 with IR, opening pressure measured 11 cm water.   -Post LP pt has worse headache. Pt had head ct with contrast 12/16: Expanded sella turcica with CSF attenuation due to near-complete empty sella versus underlying arachnoid cyst. Findings may be seen in the setting of intracranial hypertension. Pt evaluated by anesthesia-  Blood patch not indicated   -case discussed with neurology- headache is attributed to complex migraine- started on  toradol, reglan with good effects. also on Esgic prn and oxy  -c/w diamox (dose increased)     Chronic CHF.   ·  Plan: Chronic HFpEF, euvolemic on exam   -Not in acute exacerbation   -c/w bumex 2 mg PO daily   -c/w lisinopril 10 mg   -c/w spironolactone 25 mg daily  -c/w coreg 12.5mg q12h.    Type 2 diabetes mellitus.   ·  Plan: -HgbA1c 7.5  -Cont basal/bolus insulin as recommended by endocrine   -C/w ISS   -monitor fingersticks.     Essential hypertension.   ·  Plan: -C/w lisinopril 10 mg daily   -C/w spironolactone 25 mg daily   --started on coreg 12.5mg q12h  -monitor BP.     52 yo F with Pmhx of CHF, HTN, HLD, DM, Idiopathic intracranial hypertension (diagnosed 2020), chronic back pain (intercostal neuralgia/complex regional pain syndrome) s/p spinal stimulator presents to the ED with worsening headache    Headache, likely complex migraine   -CT head and venogram is negative for any acute pathology   -Unable to get MRI/MRV brain, MRI orbits w/wo contrast as pt with neurostimulator which is not MRI compatible.  -s/p LP on 12/11 with IR, opening pressure measured 11 cm water.   -Post LP pt has worse headache. Pt had head ct with contrast 12/16: Expanded sella turcica with CSF attenuation due to near-complete empty sella versus underlying arachnoid cyst. Findings may be seen in the setting of intracranial hypertension. Pt evaluated by anesthesia-  Blood patch not indicated   -case discussed with neurology- headache is attributed to complex migraine- started on  toradol, reglan with good effects. also on Esgic prn and oxy  -c/w diamox (dose increased)     Chronic CHF.   ·  Plan: Chronic HFpEF, euvolemic on exam   -Not in acute exacerbation   -c/w bumex 2 mg PO daily   -c/w lisinopril 10 mg   -c/w spironolactone 25 mg daily  -c/w coreg 12.5mg q12h.    Type 2 diabetes mellitus.   ·  Plan: -HgbA1c 7.5  -Cont basal/bolus insulin as recommended by endocrine   -C/w ISS   -monitor fingersticks.     Essential hypertension.   ·  Plan: -C/w lisinopril 10 mg daily   -C/w spironolactone 25 mg daily   --started on coreg 12.5mg q12h  -monitor BP.

## 2023-12-14 NOTE — CHART NOTE - NSCHARTNOTEFT_GEN_A_CORE
Per pt's neuro stimulator ID card and information, her stimulator is NOT MRI conditional. Case was discussed with Julián (MRI supervisor) and Ariel (stimulator rep:[phone]) and its recommended that she does not go through the MRI with this stimulator although patient reported that she has gotten MRIs before with the machine and is able to turn on and off the machine herself. Case was made aware to Dr. Vee and neuro team. No further imaging recommendation per neuro at this time.     Patient currently reports that her headache and other "symptoms" have been getting worse since the LP. Case was discussed with neuro who is concerned for possible intracranial hypotension with possible CSF leaking s/p LP. It was recommended to call anesthesia for EBP. Anesthesia wants us to confirm the placements of the electrodes on the spine. Attempted to call pt's spine surgeon Dr. Elizabeth Palmer ([phone]) but office was already closed at this time. Will try to confirm the placements of the electrodes with spine doctor tomorrow. Per pt's neuro stimulator ID card and information, her stimulator is NOT MRI conditional. Case was discussed with Julián (MRI supervisor) and Ariel (stimulator rep:[phone]) and its recommended that she does not go through the MRI with this stimulator although patient reported that she has gotten MRIs before with the machine and is able to turn on and off the machine herself. Case was made aware to Dr. Vee and neuro team. No further imaging recommendation per neuro at this time.     Patient currently reports that her headache and other "symptoms" have been getting worse since the LP. Case was discussed with neuro who is concerned for possible intracranial hypotension with possible CSF leaking s/p LP. It was recommended to call anesthesia for EBP. Anesthesia wants us to confirm the placements of the electrodes on the spine. Attempted to call pt's spine surgeon Dr. Elizabeth Palmer ([phone]) but office was already closed at this time. Will try to confirm the placements of the electrodes with spine doctor tomorrow.

## 2023-12-14 NOTE — DIETITIAN INITIAL EVALUATION ADULT - NAME AND PHONE
Madeleine Haines, MS, RDN, CDN  Pager [phone]  Also available on MS Teams  Madeleine Haines, MS, RDN, CDN  Pager [phone]  Also available on MS Teams

## 2023-12-14 NOTE — DISCHARGE NOTE PROVIDER - NSDCCPCAREPLAN_GEN_ALL_CORE_FT
PRINCIPAL DISCHARGE DIAGNOSIS  Diagnosis: Headache  Assessment and Plan of Treatment: you had LP done which was unremarkable, you were seeen by neurologist and opthalmologist, you could not get MRI as your neurostimulator is not compatible with MRI, you were seen y anesthesiologist-------      SECONDARY DISCHARGE DIAGNOSES  Diagnosis: Essential hypertension  Assessment and Plan of Treatment: continue your medication and f/u with your pcp     PRINCIPAL DISCHARGE DIAGNOSIS  Diagnosis: Headache  Assessment and Plan of Treatment: you had LP done which was unremarkable, you were seeen by neurologist and ophthalmologist, you could not get MRI as your neurostimulator is not compatible with MRI. Please follow up with them for futher care as well as pcp.      SECONDARY DISCHARGE DIAGNOSES  Diagnosis: Type 2 diabetes mellitus  Assessment and Plan of Treatment: .Continue a consistent carbohydrate diet (Meaning eating the same amount of carbohydrates at the same time each day). Monitor blood glucose levels four times a day before meals and at bedtime. Record blood sugars and bring to outpatient providers appointment in order to be reviewed by your doctor for management modifications. If your sugars are more than 400 or less than 70 you should contact your Primary Care Physician immediately. Monitor for signs/symptoms of low blood glucose, such as, dizziness, altered mental status, or cool/clammy skin. In addition, monitor for signs/symptoms of high blood glucose, such as, feeling hot, dry, fatigued, or with increased thirst/urination.  Exercise daily for at least 30 minutes and weight loss.  Follow up with your primary care physician and endocrinologist for routine Hemoglobin A1C checks and management.  Follow up with your ophthalmologist for routine yearly vision exams. Make regular podiatry appointments in order to have feet checked for wounds.    Diagnosis: Essential hypertension  Assessment and Plan of Treatment: continue your medication and f/u with your pcp

## 2023-12-15 LAB
ANION GAP SERPL CALC-SCNC: 15 MMOL/L — HIGH (ref 7–14)
ANION GAP SERPL CALC-SCNC: 15 MMOL/L — HIGH (ref 7–14)
BUN SERPL-MCNC: 17 MG/DL — SIGNIFICANT CHANGE UP (ref 7–23)
BUN SERPL-MCNC: 17 MG/DL — SIGNIFICANT CHANGE UP (ref 7–23)
CALCIUM SERPL-MCNC: 8.8 MG/DL — SIGNIFICANT CHANGE UP (ref 8.4–10.5)
CALCIUM SERPL-MCNC: 8.8 MG/DL — SIGNIFICANT CHANGE UP (ref 8.4–10.5)
CHLORIDE SERPL-SCNC: 105 MMOL/L — SIGNIFICANT CHANGE UP (ref 98–107)
CHLORIDE SERPL-SCNC: 105 MMOL/L — SIGNIFICANT CHANGE UP (ref 98–107)
CO2 SERPL-SCNC: 17 MMOL/L — LOW (ref 22–31)
CO2 SERPL-SCNC: 17 MMOL/L — LOW (ref 22–31)
CREAT SERPL-MCNC: 0.89 MG/DL — SIGNIFICANT CHANGE UP (ref 0.5–1.3)
CREAT SERPL-MCNC: 0.89 MG/DL — SIGNIFICANT CHANGE UP (ref 0.5–1.3)
EGFR: 77 ML/MIN/1.73M2 — SIGNIFICANT CHANGE UP
EGFR: 77 ML/MIN/1.73M2 — SIGNIFICANT CHANGE UP
GLUCOSE BLDC GLUCOMTR-MCNC: 194 MG/DL — HIGH (ref 70–99)
GLUCOSE BLDC GLUCOMTR-MCNC: 194 MG/DL — HIGH (ref 70–99)
GLUCOSE BLDC GLUCOMTR-MCNC: 202 MG/DL — HIGH (ref 70–99)
GLUCOSE BLDC GLUCOMTR-MCNC: 202 MG/DL — HIGH (ref 70–99)
GLUCOSE BLDC GLUCOMTR-MCNC: 260 MG/DL — HIGH (ref 70–99)
GLUCOSE BLDC GLUCOMTR-MCNC: 260 MG/DL — HIGH (ref 70–99)
GLUCOSE BLDC GLUCOMTR-MCNC: 271 MG/DL — HIGH (ref 70–99)
GLUCOSE BLDC GLUCOMTR-MCNC: 271 MG/DL — HIGH (ref 70–99)
GLUCOSE SERPL-MCNC: 222 MG/DL — HIGH (ref 70–99)
GLUCOSE SERPL-MCNC: 222 MG/DL — HIGH (ref 70–99)
HCT VFR BLD CALC: 36.9 % — SIGNIFICANT CHANGE UP (ref 34.5–45)
HCT VFR BLD CALC: 36.9 % — SIGNIFICANT CHANGE UP (ref 34.5–45)
HGB BLD-MCNC: 11.9 G/DL — SIGNIFICANT CHANGE UP (ref 11.5–15.5)
HGB BLD-MCNC: 11.9 G/DL — SIGNIFICANT CHANGE UP (ref 11.5–15.5)
MAGNESIUM SERPL-MCNC: 1.9 MG/DL — SIGNIFICANT CHANGE UP (ref 1.6–2.6)
MAGNESIUM SERPL-MCNC: 1.9 MG/DL — SIGNIFICANT CHANGE UP (ref 1.6–2.6)
MCHC RBC-ENTMCNC: 29.7 PG — SIGNIFICANT CHANGE UP (ref 27–34)
MCHC RBC-ENTMCNC: 29.7 PG — SIGNIFICANT CHANGE UP (ref 27–34)
MCHC RBC-ENTMCNC: 32.2 GM/DL — SIGNIFICANT CHANGE UP (ref 32–36)
MCHC RBC-ENTMCNC: 32.2 GM/DL — SIGNIFICANT CHANGE UP (ref 32–36)
MCV RBC AUTO: 92 FL — SIGNIFICANT CHANGE UP (ref 80–100)
MCV RBC AUTO: 92 FL — SIGNIFICANT CHANGE UP (ref 80–100)
NRBC # BLD: 0 /100 WBCS — SIGNIFICANT CHANGE UP (ref 0–0)
NRBC # BLD: 0 /100 WBCS — SIGNIFICANT CHANGE UP (ref 0–0)
NRBC # FLD: 0 K/UL — SIGNIFICANT CHANGE UP (ref 0–0)
NRBC # FLD: 0 K/UL — SIGNIFICANT CHANGE UP (ref 0–0)
PHOSPHATE SERPL-MCNC: 2.8 MG/DL — SIGNIFICANT CHANGE UP (ref 2.5–4.5)
PHOSPHATE SERPL-MCNC: 2.8 MG/DL — SIGNIFICANT CHANGE UP (ref 2.5–4.5)
PLATELET # BLD AUTO: 428 K/UL — HIGH (ref 150–400)
PLATELET # BLD AUTO: 428 K/UL — HIGH (ref 150–400)
POTASSIUM SERPL-MCNC: 3.3 MMOL/L — LOW (ref 3.5–5.3)
POTASSIUM SERPL-MCNC: 3.3 MMOL/L — LOW (ref 3.5–5.3)
POTASSIUM SERPL-SCNC: 3.3 MMOL/L — LOW (ref 3.5–5.3)
POTASSIUM SERPL-SCNC: 3.3 MMOL/L — LOW (ref 3.5–5.3)
RBC # BLD: 4.01 M/UL — SIGNIFICANT CHANGE UP (ref 3.8–5.2)
RBC # BLD: 4.01 M/UL — SIGNIFICANT CHANGE UP (ref 3.8–5.2)
RBC # FLD: 13.2 % — SIGNIFICANT CHANGE UP (ref 10.3–14.5)
RBC # FLD: 13.2 % — SIGNIFICANT CHANGE UP (ref 10.3–14.5)
SODIUM SERPL-SCNC: 137 MMOL/L — SIGNIFICANT CHANGE UP (ref 135–145)
SODIUM SERPL-SCNC: 137 MMOL/L — SIGNIFICANT CHANGE UP (ref 135–145)
WBC # BLD: 10.2 K/UL — SIGNIFICANT CHANGE UP (ref 3.8–10.5)
WBC # BLD: 10.2 K/UL — SIGNIFICANT CHANGE UP (ref 3.8–10.5)
WBC # FLD AUTO: 10.2 K/UL — SIGNIFICANT CHANGE UP (ref 3.8–10.5)
WBC # FLD AUTO: 10.2 K/UL — SIGNIFICANT CHANGE UP (ref 3.8–10.5)

## 2023-12-15 PROCEDURE — 99233 SBSQ HOSP IP/OBS HIGH 50: CPT | Mod: GC

## 2023-12-15 PROCEDURE — 99233 SBSQ HOSP IP/OBS HIGH 50: CPT

## 2023-12-15 RX ORDER — HYDROMORPHONE HYDROCHLORIDE 2 MG/ML
0.5 INJECTION INTRAMUSCULAR; INTRAVENOUS; SUBCUTANEOUS EVERY 8 HOURS
Refills: 0 | Status: DISCONTINUED | OUTPATIENT
Start: 2023-12-15 | End: 2023-12-20

## 2023-12-15 RX ORDER — POTASSIUM CHLORIDE 20 MEQ
40 PACKET (EA) ORAL ONCE
Refills: 0 | Status: COMPLETED | OUTPATIENT
Start: 2023-12-15 | End: 2023-12-15

## 2023-12-15 RX ADMIN — SPIRONOLACTONE 25 MILLIGRAM(S): 25 TABLET, FILM COATED ORAL at 06:01

## 2023-12-15 RX ADMIN — INSULIN GLARGINE 50 UNIT(S): 100 INJECTION, SOLUTION SUBCUTANEOUS at 21:56

## 2023-12-15 RX ADMIN — CARVEDILOL PHOSPHATE 3.12 MILLIGRAM(S): 80 CAPSULE, EXTENDED RELEASE ORAL at 06:03

## 2023-12-15 RX ADMIN — OXYCODONE HYDROCHLORIDE 10 MILLIGRAM(S): 5 TABLET ORAL at 22:26

## 2023-12-15 RX ADMIN — OXYCODONE HYDROCHLORIDE 10 MILLIGRAM(S): 5 TABLET ORAL at 12:58

## 2023-12-15 RX ADMIN — HYDROMORPHONE HYDROCHLORIDE 0.5 MILLIGRAM(S): 2 INJECTION INTRAMUSCULAR; INTRAVENOUS; SUBCUTANEOUS at 10:30

## 2023-12-15 RX ADMIN — Medication 3: at 12:50

## 2023-12-15 RX ADMIN — OXYCODONE HYDROCHLORIDE 10 MILLIGRAM(S): 5 TABLET ORAL at 13:28

## 2023-12-15 RX ADMIN — Medication 1: at 18:31

## 2023-12-15 RX ADMIN — ACETAZOLAMIDE 500 MILLIGRAM(S): 250 TABLET ORAL at 06:00

## 2023-12-15 RX ADMIN — ONDANSETRON 4 MILLIGRAM(S): 8 TABLET, FILM COATED ORAL at 17:11

## 2023-12-15 RX ADMIN — HYDROMORPHONE HYDROCHLORIDE 0.5 MILLIGRAM(S): 2 INJECTION INTRAMUSCULAR; INTRAVENOUS; SUBCUTANEOUS at 20:06

## 2023-12-15 RX ADMIN — NALOXEGOL OXALATE 25 MILLIGRAM(S): 12.5 TABLET, FILM COATED ORAL at 17:11

## 2023-12-15 RX ADMIN — Medication 40 MILLIEQUIVALENT(S): at 09:56

## 2023-12-15 RX ADMIN — Medication 1: at 21:50

## 2023-12-15 RX ADMIN — Medication 5 MILLIGRAM(S): at 13:00

## 2023-12-15 RX ADMIN — Medication 3 MILLIGRAM(S): at 21:56

## 2023-12-15 RX ADMIN — Medication 14 UNIT(S): at 12:50

## 2023-12-15 RX ADMIN — HYDROMORPHONE HYDROCHLORIDE 0.5 MILLIGRAM(S): 2 INJECTION INTRAMUSCULAR; INTRAVENOUS; SUBCUTANEOUS at 10:00

## 2023-12-15 RX ADMIN — Medication 14 UNIT(S): at 09:56

## 2023-12-15 RX ADMIN — OXYCODONE HYDROCHLORIDE 10 MILLIGRAM(S): 5 TABLET ORAL at 21:56

## 2023-12-15 RX ADMIN — BUMETANIDE 2 MILLIGRAM(S): 0.25 INJECTION INTRAMUSCULAR; INTRAVENOUS at 06:00

## 2023-12-15 RX ADMIN — Medication 2: at 09:55

## 2023-12-15 RX ADMIN — Medication 14 UNIT(S): at 18:31

## 2023-12-15 RX ADMIN — ACETAZOLAMIDE 500 MILLIGRAM(S): 250 TABLET ORAL at 18:33

## 2023-12-15 RX ADMIN — LISINOPRIL 10 MILLIGRAM(S): 2.5 TABLET ORAL at 05:59

## 2023-12-15 RX ADMIN — CARVEDILOL PHOSPHATE 3.12 MILLIGRAM(S): 80 CAPSULE, EXTENDED RELEASE ORAL at 18:34

## 2023-12-15 RX ADMIN — HYDROMORPHONE HYDROCHLORIDE 0.5 MILLIGRAM(S): 2 INJECTION INTRAMUSCULAR; INTRAVENOUS; SUBCUTANEOUS at 19:36

## 2023-12-15 NOTE — PROGRESS NOTE ADULT - PROBLEM SELECTOR PLAN 1
Patient reports to have band-like headache, tinnitus, blurry vision, and photophobia, consistent with her previous episode of idiopathic intracranial HTN /pseudotumor cerebri   -Neurology and ophtho consulted, recs appreciated   -ED attempted to perform LP 10X but was not successful, Had LP on 12/11, opening pressure measured 11 cm water. Given OP is not elevated, patient's episodes of transient vision loss cannot be attributed to elevated ICP. Recommend MRI brain and orbits with and without contrast and MRV. Appreciate neuro eval. Please continue diamox   -f/u neuro recs, called Neuro as well   -CT head and venogram is negative for any acute pathology   -C/w Diamox  mg BID   -Unable to get MRI/MRV brain, MRI orbits w/wo contrast as pt with neurostimulator which is not MRI compatible.   -Anesthesia consulted for possible blood patch,  per anesthesia - suggest contacting neurosurgery to determine if lead placmenet of the stimulator is an issue.  Dicussed with nsx, per nsx no issue and will write a chart note  -If pt has intracranial hypotension per radiographic studies, studies, suggest fluro-assisted EBP.   -f/u head ct with contrast  -Pain control  -mild leukocytosis - cont to monitor for now

## 2023-12-15 NOTE — PROGRESS NOTE ADULT - SUBJECTIVE AND OBJECTIVE BOX
Patient is a 53y old  Female who presents with a chief complaint of Headache, tinnitus (15 Dec 2023 13:13)      SUBJECTIVE / OVERNIGHT EVENTS: Pt seen and examined at 11:05am, no overnight events, still c/o headache says that is is worse after she got the LP. Got dilaudid for headache and slightly better after pain meds. No other complaints, no other new issues reported.      MEDICATIONS  (STANDING):  acetaZOLAMIDE    Tablet 500 milliGRAM(s) Oral two times a day  buMETAnide 2 milliGRAM(s) Oral daily  carvedilol 3.125 milliGRAM(s) Oral every 12 hours  dextrose 5%. 1000 milliLiter(s) (100 mL/Hr) IV Continuous <Continuous>  dextrose 5%. 1000 milliLiter(s) (50 mL/Hr) IV Continuous <Continuous>  dextrose 50% Injectable 25 Gram(s) IV Push once  dextrose 50% Injectable 12.5 Gram(s) IV Push once  dextrose 50% Injectable 25 Gram(s) IV Push once  glucagon  Injectable 1 milliGRAM(s) IntraMuscular once  insulin glargine Injectable (LANTUS) 50 Unit(s) SubCutaneous at bedtime  insulin lispro (ADMELOG) corrective regimen sliding scale   SubCutaneous three times a day before meals  insulin lispro (ADMELOG) corrective regimen sliding scale   SubCutaneous at bedtime  insulin lispro Injectable (ADMELOG) 14 Unit(s) SubCutaneous three times a day before meals  lisinopril 10 milliGRAM(s) Oral daily  naloxegol 25 milliGRAM(s) Oral daily  spironolactone 25 milliGRAM(s) Oral daily    MEDICATIONS  (PRN):  acetaminophen     Tablet .. 650 milliGRAM(s) Oral every 6 hours PRN Temp greater or equal to 38C (100.4F), Mild Pain (1 - 3)  acetaminophen   IVPB .. 1000 milliGRAM(s) IV Intermittent once PRN Mild Pain (1 - 3)  aluminum hydroxide/magnesium hydroxide/simethicone Suspension 30 milliLiter(s) Oral every 4 hours PRN Dyspepsia  baclofen 5 milliGRAM(s) Oral every 8 hours PRN Muscle Spasm  dextrose Oral Gel 15 Gram(s) Oral once PRN Blood Glucose LESS THAN 70 milliGRAM(s)/deciliter  HYDROmorphone  Injectable 0.5 milliGRAM(s) IV Push every 8 hours PRN Severe Pain (7 - 10)  melatonin 3 milliGRAM(s) Oral at bedtime PRN Insomnia  ondansetron Injectable 4 milliGRAM(s) IV Push every 8 hours PRN Nausea and/or Vomiting  oxyCODONE    IR 10 milliGRAM(s) Oral every 6 hours PRN Moderate Pain (4 - 6)      Vital Signs Last 24 Hrs  T(C): 36.6 (15 Dec 2023 11:05), Max: 36.8 (14 Dec 2023 22:32)  T(F): 97.9 (15 Dec 2023 11:05), Max: 98.3 (15 Dec 2023 05:52)  HR: 66 (15 Dec 2023 11:05) (65 - 70)  BP: 127/63 (15 Dec 2023 11:05) (125/62 - 135/67)  BP(mean): --  RR: 18 (15 Dec 2023 11:05) (18 - 18)  SpO2: 100% (15 Dec 2023 11:05) (100% - 100%)    Parameters below as of 15 Dec 2023 05:52  Patient On (Oxygen Delivery Method): room air      CAPILLARY BLOOD GLUCOSE      POCT Blood Glucose.: 260 mg/dL (15 Dec 2023 12:34)  POCT Blood Glucose.: 202 mg/dL (15 Dec 2023 09:13)  POCT Blood Glucose.: 250 mg/dL (14 Dec 2023 21:35)  POCT Blood Glucose.: 182 mg/dL (14 Dec 2023 17:22)    I&O's Summary    14 Dec 2023 07:01  -  15 Dec 2023 07:00  --------------------------------------------------------  IN: 0 mL / OUT: 3700 mL / NET: -3700 mL    15 Dec 2023 07:01  -  15 Dec 2023 16:57  --------------------------------------------------------  IN: 700 mL / OUT: 1000 mL / NET: -300 mL        PHYSICAL EXAM:  GENERAL: NAD  CHEST/LUNG: Clear to auscultation bilaterally; No wheeze  HEART: Regular rate and rhythm  ABDOMEN: Soft, Nontender, Nondistended  EXTREMITIES: no LE edema  PSYCH: Calm  NEUROLOGY: AAOx3  SKIN: No rashes or lesions    LABS:                        11.9   10.20 )-----------( 428      ( 15 Dec 2023 06:18 )             36.9     12-15    137  |  105  |  17  ----------------------------<  222<H>  3.3<L>   |  17<L>  |  0.89    Ca    8.8      15 Dec 2023 06:18  Phos  2.8     12-15  Mg     1.90     12-15            Urinalysis Basic - ( 15 Dec 2023 06:18 )    Color: x / Appearance: x / SG: x / pH: x  Gluc: 222 mg/dL / Ketone: x  / Bili: x / Urobili: x   Blood: x / Protein: x / Nitrite: x   Leuk Esterase: x / RBC: x / WBC x   Sq Epi: x / Non Sq Epi: x / Bacteria: x        RADIOLOGY & ADDITIONAL TESTS:    Imaging Personally Reviewed:    Consultant(s) Notes Reviewed:      Care Discussed with Consultants/Other Providers:

## 2023-12-15 NOTE — PROGRESS NOTE ADULT - ASSESSMENT
ROBIN LÓPEZ is a 53y (1970) woman with a PMHx significant for CHF, HTN, HLD, DM, Idiopathic intracranial hypertension (diagnosed 2020), chronic back pain (intercostal neuralgia/complex regional pain syndrome) s/p spinal stimulator presenting as a transfer from [facility] for further evaluation of IIH.     Patient reporting worsening generalized headache (on the right side above ear, worse with standing and walking around) for the past 3 days and says she is having constant wooshing in her ears. Endorsing nausea. Also, having pain with eye movements, light sensitivity and blurry vision. Currently, on diamox 250 mg bid, no dosing change in the last 3 years. Has lost 150 lbs in the last 3 years. Saw Agnse Frank from neurology. Does not follow with neuro-optho. Denies focal weakness and focal numbness that is new. Says she has peripheral neuropathy in her LUE and LLE, which has been numb.     CTH at [facility] was negative for acute findings. Last MRI brain was done on 8/11/2023, which showed a partial empty sella with a mildly enlarged/remodeled pituitary fossa.     Impression: Worsening positional HA s/p multiple LP attempts likely post LP HA at this time iso known IIH.    Recommendations:   Anesthesia consult for epidural blood patch   LP-opening pressure normal   CTV Head w contrast-inconclusive for transverse sinus stenosis.   MRI Brain w/wo, MRI Orbits w/wo, MRV w contrast-unable to be done given spinal stimulator reported to be incompatible    C/w diamox 500 mg bid   F/u outpatient with Dr. Mosquera- Neuro optho  F/u outpatient with neurology at [address] [place] [address]   No further inpatient neurological work up     Seen and d/w Dr. Edmonds ROBIN LÓPEZ is a 53y (1970) woman with a PMHx significant for CHF, HTN, HLD, DM, Idiopathic intracranial hypertension (diagnosed 2020), chronic back pain (intercostal neuralgia/complex regional pain syndrome) s/p spinal stimulator presenting as a transfer from [facility] for further evaluation of IIH.     Patient reporting worsening generalized headache (on the right side above ear, worse with standing and walking around) for the past 3 days and says she is having constant wooshing in her ears. Endorsing nausea. Also, having pain with eye movements, light sensitivity and blurry vision. Currently, on diamox 250 mg bid, no dosing change in the last 3 years. Has lost 150 lbs in the last 3 years. Saw Agnes Frank from neurology. Does not follow with neuro-optho. Denies focal weakness and focal numbness that is new. Says she has peripheral neuropathy in her LUE and LLE, which has been numb.     CTH at [facility] was negative for acute findings. Last MRI brain was done on 8/11/2023, which showed a partial empty sella with a mildly enlarged/remodeled pituitary fossa.     Impression: Worsening positional HA s/p multiple LP attempts likely post LP HA at this time iso known IIH.    Recommendations:   Anesthesia consult for epidural blood patch   LP-opening pressure normal   CTV Head w contrast-inconclusive for transverse sinus stenosis.   MRI Brain w/wo, MRI Orbits w/wo, MRV w contrast-unable to be done given spinal stimulator reported to be incompatible    C/w diamox 500 mg bid   F/u outpatient with Dr. Mosquera- Neuro optho  F/u outpatient with neurology at [address] [place] [address]   No further inpatient neurological work up     Seen and d/w Dr. Edmonds

## 2023-12-15 NOTE — PROGRESS NOTE ADULT - SUBJECTIVE AND OBJECTIVE BOX
Neurology - Progress Note    -  Spectra: [phone] ([facility]), [phone] ([facility])  -    Subjective: NAEON. Seen and examined at bedside with neurology attending.     Objective:   Medications:  MEDICATIONS  (STANDING):  acetaZOLAMIDE    Tablet 500 milliGRAM(s) Oral two times a day  buMETAnide 2 milliGRAM(s) Oral daily  carvedilol 3.125 milliGRAM(s) Oral every 12 hours  dextrose 5%. 1000 milliLiter(s) (100 mL/Hr) IV Continuous <Continuous>  dextrose 5%. 1000 milliLiter(s) (50 mL/Hr) IV Continuous <Continuous>  dextrose 50% Injectable 25 Gram(s) IV Push once  dextrose 50% Injectable 12.5 Gram(s) IV Push once  dextrose 50% Injectable 25 Gram(s) IV Push once  glucagon  Injectable 1 milliGRAM(s) IntraMuscular once  insulin glargine Injectable (LANTUS) 50 Unit(s) SubCutaneous at bedtime  insulin lispro (ADMELOG) corrective regimen sliding scale   SubCutaneous at bedtime  insulin lispro (ADMELOG) corrective regimen sliding scale   SubCutaneous three times a day before meals  insulin lispro Injectable (ADMELOG) 14 Unit(s) SubCutaneous three times a day before meals  lisinopril 10 milliGRAM(s) Oral daily  naloxegol 25 milliGRAM(s) Oral daily  spironolactone 25 milliGRAM(s) Oral daily    MEDICATIONS  (PRN):  acetaminophen     Tablet .. 650 milliGRAM(s) Oral every 6 hours PRN Temp greater or equal to 38C (100.4F), Mild Pain (1 - 3)  acetaminophen   IVPB .. 1000 milliGRAM(s) IV Intermittent once PRN Mild Pain (1 - 3)  aluminum hydroxide/magnesium hydroxide/simethicone Suspension 30 milliLiter(s) Oral every 4 hours PRN Dyspepsia  baclofen 5 milliGRAM(s) Oral every 8 hours PRN Muscle Spasm  dextrose Oral Gel 15 Gram(s) Oral once PRN Blood Glucose LESS THAN 70 milliGRAM(s)/deciliter  HYDROmorphone  Injectable 0.5 milliGRAM(s) IV Push every 8 hours PRN Severe Pain (7 - 10)  melatonin 3 milliGRAM(s) Oral at bedtime PRN Insomnia  ondansetron Injectable 4 milliGRAM(s) IV Push every 8 hours PRN Nausea and/or Vomiting  oxyCODONE    IR 10 milliGRAM(s) Oral every 6 hours PRN Moderate Pain (4 - 6)      Vitals:  T(C): 36.6 (12-15-23 @ 11:05), Max: 36.8 (12-14-23 @ 22:32)  HR: 66 (12-15-23 @ 11:05) (63 - 70)  BP: 127/63 (12-15-23 @ 11:05) (122/53 - 135/67)  RR: 18 (12-15-23 @ 11:05) (18 - 18)  SpO2: 100% (12-15-23 @ 11:05) (100% - 100%)    Physical Examination:  General - NAD, obese   Eyes -Fundoscopy not performed due to safety precautions in the setting of the COVID-19 pandemic    Neurologic Exam:  Mental status - Awake, Alert, Oriented to person, place, and time. Speech fluent. Follows simple and complex commands. Attention/concentration and fund of knowledge intact    Cranial nerves - PERRL, VFF, EOMI, face sensation (V1-V3) intact b/l, facial strength intact without asymmetry b/l, hearing intact b/l, palate with symmetric elevation, tongue midline on protrusion with full lateral movement    Motor - Normal bulk and tone throughout.   Strength testing            Deltoid         Biceps      Triceps     Wrist Extension           Wrist Flexion           R            5                 5               5                     5                              5                  5  L             5                 5               5                     5                              5                  5              Hip Flexion        Hip Extension        Knee Flexion        Knee Extension          Dorsiflexion    Plantar Flexion  R              5                           5                       5                           5                            5                          5  L              5                           5                        5                           5                            5                          5    Sensation - LUE with decreased sensation to light touch and pinprick. Left lower leg with decreased sensation to light touch and pinprick; left thigh with normal sensation. RUE and RLE with normal sensation throughout.    DTR's -             Biceps      Triceps     Brachioradialis      Patellar    Ankle    Toes/plantar response  R             2+           honey                   2+               2+         2+                 mute  L              2+           honey                  2+                2+         2+                 mute    Coordination - Finger to Nose intact b/l. No tremors appreciated    Station normal. Gait HONEY.    Labs:                        11.9   10.20 )-----------( [id number]      ( 15 Dec 2023 06:18 )             36.9     12-15    137  |  105  |  17  ----------------------------<  222<H>  3.3<L>   |  17<L>  |  0.89    Ca    8.8      15 Dec 2023 06:18  Phos  2.8     12-15  Mg     1.90     12-15      CAPILLARY BLOOD GLUCOSE      POCT Blood Glucose.: 260 mg/dL (15 Dec 2023 12:34)          CSF:    Total Nucleated Cell Count, CSF: 1 cells/uL (12-11-23 @ 09:15)  RBC Count - Spinal Fluid: 100 cells/uL (12-11-23 @ 09:15)          Protein, CSF: 20 mg/dL (12-11-23 @ 09:00)        Radiology:  < from: CT Venogram Brain w/ IV Cont (12.07.23 @ 11:29) >  CT venogram head: No evidence of venous sinus thrombosis.    < end of copied text >      < from: CT Head No Cont (12.07.23 @ 00:33) >  IMPRESSION:  No acute intracranial hemorrhage or mass effect. No hydrocephalus.    < end of copied text >  < from: CT Head No Cont (07.20.23 @ 18:54) >  IMPRESSION:  No intracranial mass effect or hemorrhage.    No fracture.    < end of copied text >  < from: MR Angio Neck w/wo IV Cont (05.10.21 @ 18:12) >  Mild volume loss, microvascular disease, mineralization/calcification of the globus pallidus, substantia nigra, and red nuclei, falx ossification, no restricted diffusion, hemorrhage or midline shift, basal cisterns are patent.    Partially empty sella, prominent bilateral optic nerve sheaths flattening of the posterior globe margins, findings seen with pseudotumor cerebri syndrome.    Tortuous, patent intracranial and extracranial MR angiography, variation in anatomy as detailed above, no ICA origin hemodynamically significant stenosis by NASCET criteria.    < end of copied text >       Neurology - Progress Note    -  Spectra: [phone] ([facility]), [phone] ([facility])  -    Subjective: NAEON. Seen and examined at bedside with neurology attending.     Objective:   Medications:  MEDICATIONS  (STANDING):  acetaZOLAMIDE    Tablet 500 milliGRAM(s) Oral two times a day  buMETAnide 2 milliGRAM(s) Oral daily  carvedilol 3.125 milliGRAM(s) Oral every 12 hours  dextrose 5%. 1000 milliLiter(s) (100 mL/Hr) IV Continuous <Continuous>  dextrose 5%. 1000 milliLiter(s) (50 mL/Hr) IV Continuous <Continuous>  dextrose 50% Injectable 25 Gram(s) IV Push once  dextrose 50% Injectable 12.5 Gram(s) IV Push once  dextrose 50% Injectable 25 Gram(s) IV Push once  glucagon  Injectable 1 milliGRAM(s) IntraMuscular once  insulin glargine Injectable (LANTUS) 50 Unit(s) SubCutaneous at bedtime  insulin lispro (ADMELOG) corrective regimen sliding scale   SubCutaneous at bedtime  insulin lispro (ADMELOG) corrective regimen sliding scale   SubCutaneous three times a day before meals  insulin lispro Injectable (ADMELOG) 14 Unit(s) SubCutaneous three times a day before meals  lisinopril 10 milliGRAM(s) Oral daily  naloxegol 25 milliGRAM(s) Oral daily  spironolactone 25 milliGRAM(s) Oral daily    MEDICATIONS  (PRN):  acetaminophen     Tablet .. 650 milliGRAM(s) Oral every 6 hours PRN Temp greater or equal to 38C (100.4F), Mild Pain (1 - 3)  acetaminophen   IVPB .. 1000 milliGRAM(s) IV Intermittent once PRN Mild Pain (1 - 3)  aluminum hydroxide/magnesium hydroxide/simethicone Suspension 30 milliLiter(s) Oral every 4 hours PRN Dyspepsia  baclofen 5 milliGRAM(s) Oral every 8 hours PRN Muscle Spasm  dextrose Oral Gel 15 Gram(s) Oral once PRN Blood Glucose LESS THAN 70 milliGRAM(s)/deciliter  HYDROmorphone  Injectable 0.5 milliGRAM(s) IV Push every 8 hours PRN Severe Pain (7 - 10)  melatonin 3 milliGRAM(s) Oral at bedtime PRN Insomnia  ondansetron Injectable 4 milliGRAM(s) IV Push every 8 hours PRN Nausea and/or Vomiting  oxyCODONE    IR 10 milliGRAM(s) Oral every 6 hours PRN Moderate Pain (4 - 6)      Vitals:  T(C): 36.6 (12-15-23 @ 11:05), Max: 36.8 (12-14-23 @ 22:32)  HR: 66 (12-15-23 @ 11:05) (63 - 70)  BP: 127/63 (12-15-23 @ 11:05) (122/53 - 135/67)  RR: 18 (12-15-23 @ 11:05) (18 - 18)  SpO2: 100% (12-15-23 @ 11:05) (100% - 100%)    Physical Examination:  General - NAD, obese   Eyes -Fundoscopy not performed due to safety precautions in the setting of the COVID-19 pandemic    Neurologic Exam:  Mental status - Awake, Alert, Oriented to person, place, and time. Speech fluent. Follows simple and complex commands. Attention/concentration and fund of knowledge intact    Cranial nerves - PERRL, VFF, EOMI, face sensation (V1-V3) intact b/l, facial strength intact without asymmetry b/l, hearing intact b/l, palate with symmetric elevation, tongue midline on protrusion with full lateral movement    Motor - Normal bulk and tone throughout.   Strength testing            Deltoid         Biceps      Triceps     Wrist Extension           Wrist Flexion           R            5                 5               5                     5                              5                  5  L             5                 5               5                     5                              5                  5              Hip Flexion        Hip Extension        Knee Flexion        Knee Extension          Dorsiflexion    Plantar Flexion  R              5                           5                       5                           5                            5                          5  L              5                           5                        5                           5                            5                          5    Sensation - LUE with decreased sensation to light touch and pinprick. Left lower leg with decreased sensation to light touch and pinprick; left thigh with normal sensation. RUE and RLE with normal sensation throughout.    DTR's -             Biceps      Triceps     Brachioradialis      Patellar    Ankle    Toes/plantar response  R             2+           honey                   2+               2+         2+                 mute  L              2+           honey                  2+                2+         2+                 mute    Coordination - Finger to Nose intact b/l. No tremors appreciated    Station normal. Gait HONEY.    Labs:                        11.9   10.20 )-----------( 428      ( 15 Dec 2023 06:18 )             36.9     12-15    137  |  105  |  17  ----------------------------<  222<H>  3.3<L>   |  17<L>  |  0.89    Ca    8.8      15 Dec 2023 06:18  Phos  2.8     12-15  Mg     1.90     12-15      CAPILLARY BLOOD GLUCOSE      POCT Blood Glucose.: 260 mg/dL (15 Dec 2023 12:34)          CSF:    Total Nucleated Cell Count, CSF: 1 cells/uL (12-11-23 @ 09:15)  RBC Count - Spinal Fluid: 100 cells/uL (12-11-23 @ 09:15)          Protein, CSF: 20 mg/dL (12-11-23 @ 09:00)        Radiology:  < from: CT Venogram Brain w/ IV Cont (12.07.23 @ 11:29) >  CT venogram head: No evidence of venous sinus thrombosis.    < end of copied text >      < from: CT Head No Cont (12.07.23 @ 00:33) >  IMPRESSION:  No acute intracranial hemorrhage or mass effect. No hydrocephalus.    < end of copied text >  < from: CT Head No Cont (07.20.23 @ 18:54) >  IMPRESSION:  No intracranial mass effect or hemorrhage.    No fracture.    < end of copied text >  < from: MR Angio Neck w/wo IV Cont (05.10.21 @ 18:12) >  Mild volume loss, microvascular disease, mineralization/calcification of the globus pallidus, substantia nigra, and red nuclei, falx ossification, no restricted diffusion, hemorrhage or midline shift, basal cisterns are patent.    Partially empty sella, prominent bilateral optic nerve sheaths flattening of the posterior globe margins, findings seen with pseudotumor cerebri syndrome.    Tortuous, patent intracranial and extracranial MR angiography, variation in anatomy as detailed above, no ICA origin hemodynamically significant stenosis by NASCET criteria.    < end of copied text >

## 2023-12-15 NOTE — PROGRESS NOTE ADULT - TIME BILLING
I interviewed the patient, discussed the case with the Resident and agree with the findings and plan as documented in the Resident's note except below.  Ms. Linares is a 53 year old right handed  woman with a PMHx of morbid obesity, CHF, HTN, HLD, DM, Idiopathic intracranial hypertension (diagnosed 2020), chronic back pain (intercostal neuralgia/complex regional pain syndrome) s/p spinal stimulator presenting as a transfer from [facility] for further evaluation of Idiopathic intracranial pressure. Currently she is getting headache as post LP headache, worse with movements and improved with lying down.   I agree with blood patch.   Continue Diamox 500 mg BID and behavior modification including weight loss.  Continue medical management, neuro- check and fall precaution.  GI and DVT prophylaxis.  I discussed the diagnosis and treatment plan with the patient. Risk benefit and alternatives to the treatment were discussed. All questions and concerns were addressed. The patient demonstrated good understanding of the treatment plan.  My cumulative time taking care of this patient is 60 minutes.  If you have any further questions, please do not hesitate to contact our consult service.  Thank you for allowing us to participate in this patient care. I interviewed the patient, discussed the case with the Resident and agree with the findings and plan as documented in the Resident's note except below.  Ms. Linares is a 53 year old right handed  woman with a PMHx of morbid obesity, CHF, HTN, HLD, DM, Idiopathic intracranial hypertension (diagnosed 2020), chronic back pain (intercostal neuralgia/complex regional pain syndrome) s/p spinal stimulator presenting as a transfer from [facility] for further evaluation of Idiopathic intracranial pressure. Currently she is getting headache as post LP headache, worse with movements and improved with lying down.   I agree with blood patch.   Continue Diamox 500 mg BID and behavior modification including weight loss.  Continue medical management, neuro- check and fall precaution.  GI and DVT prophylaxis.  I discussed the diagnosis and treatment plan with the patient. Risk benefit and alternatives to the treatment were discussed. All questions and concerns were addressed. The patient demonstrated good understanding of the treatment plan.  My cumulative time taking care of this patient is 60 minutes.  If you have any further questions, please do not hesitate to contact our consult service.  Thank you for allowing us to participate in this patient care.

## 2023-12-15 NOTE — PROGRESS NOTE ADULT - PROBLEM SELECTOR PLAN 3
-HgbA1c 7.5  -Cont lantus to 50 units qhs, increase admelog to 14 units TID, will monitor and adjust insulin   -C/w ISS   -monitor fingersticks

## 2023-12-15 NOTE — CHART NOTE - NSCHARTNOTEFT_GEN_A_CORE
No contraindication for blood patch via LP with hx spinal cord stimulator. Patient already had mutliple LPs which is a similar procedure to a blood patch.

## 2023-12-15 NOTE — PROGRESS NOTE ADULT - PROBLEM SELECTOR PLAN 5
DVT ppx SCD for now    Plan discussed with ACP DVT ppx SCD for now    -Hypokalemia- replete k    Plan discussed with ACP

## 2023-12-15 NOTE — PROGRESS NOTE ADULT - PROBLEM SELECTOR PLAN 2
Chronic HFpEF, euvolemic on exam   -Not in acute exacerbation   -c/w bumex 2 mg PO daily   -c/w lisinopril 10 mg   -c/w spironolactone 25 mg daily  -c/w coreg 3.125mg q12h

## 2023-12-15 NOTE — PROGRESS NOTE ADULT - PROBLEM SELECTOR PLAN 4
-C/w lisinopril 10 mg daily   -C/w spironolactone 25 mg daily   --started on coreg 3.125mg q12h  -monitor BP

## 2023-12-15 NOTE — CHART NOTE - NSCHARTNOTEFT_GEN_A_CORE
Anesthesia was called for possible epidural blood patch s/p LP as there is concern for possible intracranial hypotension with CSF leakage. Anesthesia recommends to confirm intracranial hypotension w/ CT scan w/ IV contrast head imaging. Once imaging confirms the diagnosis, primary team should reach out to IR for fluoroscopic guided epidural blood patch. Anesthesia also recommends to confirm if EBP procedure is safe procedure on patient with neuro stimulator. Case was discussed with neurology who recommended to relay the question to neurosugery. Neurosurgery was curbsided for a question if it is safe for patient to under go EBP. Per neurosx, if pt has hx of getting multiple lumbar punctures, EBP procedure will considered same procedure. Case was discussed with Dr. Vee.

## 2023-12-15 NOTE — CHART NOTE - NSCHARTNOTEFT_GEN_A_CORE
I was called regarding the findings regarding where this patient's spinal cord stimulator and its leads are. I was unable to gather all the details, but, per report, this pt has a known h/o pseudotumor cerebri. She had an attempted lumbar puncture after multiple attempts for HA ultimately under fluoroscopy, with no relief, per her report.     The patient does not currently have any radiographic evidence of intreacranial hypotension. Nor does she currently have signs and symptoms c/w PDPH, except for photophobia and muffled hearing (that is, no positional componentt, currently).     Suggest evaluate with radiology (MRI vs. contrast enhanced CT, looking for stigmata associated with PDPH/intracranial hypotension) prior to consdieration of an EBP. Also suggest conacting neurosurgery to determine if lead placmenet of the stimulator is an issue.   If pt has intracranial hypotension per radiographic stdies, studies, suggest fluro-assisted EBP.     Have discussed with CINDY Singh MD, PhD

## 2023-12-16 LAB
ANION GAP SERPL CALC-SCNC: 11 MMOL/L — SIGNIFICANT CHANGE UP (ref 7–14)
ANION GAP SERPL CALC-SCNC: 11 MMOL/L — SIGNIFICANT CHANGE UP (ref 7–14)
BUN SERPL-MCNC: 19 MG/DL — SIGNIFICANT CHANGE UP (ref 7–23)
BUN SERPL-MCNC: 19 MG/DL — SIGNIFICANT CHANGE UP (ref 7–23)
CALCIUM SERPL-MCNC: 9.1 MG/DL — SIGNIFICANT CHANGE UP (ref 8.4–10.5)
CALCIUM SERPL-MCNC: 9.1 MG/DL — SIGNIFICANT CHANGE UP (ref 8.4–10.5)
CHLORIDE SERPL-SCNC: 106 MMOL/L — SIGNIFICANT CHANGE UP (ref 98–107)
CHLORIDE SERPL-SCNC: 106 MMOL/L — SIGNIFICANT CHANGE UP (ref 98–107)
CO2 SERPL-SCNC: 19 MMOL/L — LOW (ref 22–31)
CO2 SERPL-SCNC: 19 MMOL/L — LOW (ref 22–31)
CREAT SERPL-MCNC: 0.83 MG/DL — SIGNIFICANT CHANGE UP (ref 0.5–1.3)
CREAT SERPL-MCNC: 0.83 MG/DL — SIGNIFICANT CHANGE UP (ref 0.5–1.3)
CULTURE RESULTS: NO GROWTH — SIGNIFICANT CHANGE UP
CULTURE RESULTS: NO GROWTH — SIGNIFICANT CHANGE UP
EGFR: 84 ML/MIN/1.73M2 — SIGNIFICANT CHANGE UP
EGFR: 84 ML/MIN/1.73M2 — SIGNIFICANT CHANGE UP
GLUCOSE BLDC GLUCOMTR-MCNC: 193 MG/DL — HIGH (ref 70–99)
GLUCOSE BLDC GLUCOMTR-MCNC: 193 MG/DL — HIGH (ref 70–99)
GLUCOSE BLDC GLUCOMTR-MCNC: 200 MG/DL — HIGH (ref 70–99)
GLUCOSE BLDC GLUCOMTR-MCNC: 200 MG/DL — HIGH (ref 70–99)
GLUCOSE BLDC GLUCOMTR-MCNC: 239 MG/DL — HIGH (ref 70–99)
GLUCOSE BLDC GLUCOMTR-MCNC: 239 MG/DL — HIGH (ref 70–99)
GLUCOSE BLDC GLUCOMTR-MCNC: 262 MG/DL — HIGH (ref 70–99)
GLUCOSE BLDC GLUCOMTR-MCNC: 262 MG/DL — HIGH (ref 70–99)
GLUCOSE SERPL-MCNC: 194 MG/DL — HIGH (ref 70–99)
GLUCOSE SERPL-MCNC: 194 MG/DL — HIGH (ref 70–99)
HCT VFR BLD CALC: 37.8 % — SIGNIFICANT CHANGE UP (ref 34.5–45)
HCT VFR BLD CALC: 37.8 % — SIGNIFICANT CHANGE UP (ref 34.5–45)
HGB BLD-MCNC: 12.2 G/DL — SIGNIFICANT CHANGE UP (ref 11.5–15.5)
HGB BLD-MCNC: 12.2 G/DL — SIGNIFICANT CHANGE UP (ref 11.5–15.5)
MAGNESIUM SERPL-MCNC: 1.8 MG/DL — SIGNIFICANT CHANGE UP (ref 1.6–2.6)
MAGNESIUM SERPL-MCNC: 1.8 MG/DL — SIGNIFICANT CHANGE UP (ref 1.6–2.6)
MCHC RBC-ENTMCNC: 29.7 PG — SIGNIFICANT CHANGE UP (ref 27–34)
MCHC RBC-ENTMCNC: 29.7 PG — SIGNIFICANT CHANGE UP (ref 27–34)
MCHC RBC-ENTMCNC: 32.3 GM/DL — SIGNIFICANT CHANGE UP (ref 32–36)
MCHC RBC-ENTMCNC: 32.3 GM/DL — SIGNIFICANT CHANGE UP (ref 32–36)
MCV RBC AUTO: 92 FL — SIGNIFICANT CHANGE UP (ref 80–100)
MCV RBC AUTO: 92 FL — SIGNIFICANT CHANGE UP (ref 80–100)
NRBC # BLD: 0 /100 WBCS — SIGNIFICANT CHANGE UP (ref 0–0)
NRBC # BLD: 0 /100 WBCS — SIGNIFICANT CHANGE UP (ref 0–0)
NRBC # FLD: 0 K/UL — SIGNIFICANT CHANGE UP (ref 0–0)
NRBC # FLD: 0 K/UL — SIGNIFICANT CHANGE UP (ref 0–0)
PHOSPHATE SERPL-MCNC: 3.7 MG/DL — SIGNIFICANT CHANGE UP (ref 2.5–4.5)
PHOSPHATE SERPL-MCNC: 3.7 MG/DL — SIGNIFICANT CHANGE UP (ref 2.5–4.5)
PLATELET # BLD AUTO: 422 K/UL — HIGH (ref 150–400)
PLATELET # BLD AUTO: 422 K/UL — HIGH (ref 150–400)
POTASSIUM SERPL-MCNC: 4 MMOL/L — SIGNIFICANT CHANGE UP (ref 3.5–5.3)
POTASSIUM SERPL-MCNC: 4 MMOL/L — SIGNIFICANT CHANGE UP (ref 3.5–5.3)
POTASSIUM SERPL-SCNC: 4 MMOL/L — SIGNIFICANT CHANGE UP (ref 3.5–5.3)
POTASSIUM SERPL-SCNC: 4 MMOL/L — SIGNIFICANT CHANGE UP (ref 3.5–5.3)
RBC # BLD: 4.11 M/UL — SIGNIFICANT CHANGE UP (ref 3.8–5.2)
RBC # BLD: 4.11 M/UL — SIGNIFICANT CHANGE UP (ref 3.8–5.2)
RBC # FLD: 13.4 % — SIGNIFICANT CHANGE UP (ref 10.3–14.5)
RBC # FLD: 13.4 % — SIGNIFICANT CHANGE UP (ref 10.3–14.5)
SODIUM SERPL-SCNC: 136 MMOL/L — SIGNIFICANT CHANGE UP (ref 135–145)
SODIUM SERPL-SCNC: 136 MMOL/L — SIGNIFICANT CHANGE UP (ref 135–145)
SPECIMEN SOURCE: SIGNIFICANT CHANGE UP
SPECIMEN SOURCE: SIGNIFICANT CHANGE UP
WBC # BLD: 10.25 K/UL — SIGNIFICANT CHANGE UP (ref 3.8–10.5)
WBC # BLD: 10.25 K/UL — SIGNIFICANT CHANGE UP (ref 3.8–10.5)
WBC # FLD AUTO: 10.25 K/UL — SIGNIFICANT CHANGE UP (ref 3.8–10.5)
WBC # FLD AUTO: 10.25 K/UL — SIGNIFICANT CHANGE UP (ref 3.8–10.5)

## 2023-12-16 PROCEDURE — 99232 SBSQ HOSP IP/OBS MODERATE 35: CPT

## 2023-12-16 PROCEDURE — 70470 CT HEAD/BRAIN W/O & W/DYE: CPT | Mod: 26

## 2023-12-16 RX ORDER — OXYCODONE HYDROCHLORIDE 5 MG/1
10 TABLET ORAL EVERY 6 HOURS
Refills: 0 | Status: DISCONTINUED | OUTPATIENT
Start: 2023-12-16 | End: 2023-12-21

## 2023-12-16 RX ADMIN — SPIRONOLACTONE 25 MILLIGRAM(S): 25 TABLET, FILM COATED ORAL at 07:20

## 2023-12-16 RX ADMIN — BUMETANIDE 2 MILLIGRAM(S): 0.25 INJECTION INTRAMUSCULAR; INTRAVENOUS at 07:18

## 2023-12-16 RX ADMIN — HYDROMORPHONE HYDROCHLORIDE 0.5 MILLIGRAM(S): 2 INJECTION INTRAMUSCULAR; INTRAVENOUS; SUBCUTANEOUS at 11:30

## 2023-12-16 RX ADMIN — HYDROMORPHONE HYDROCHLORIDE 0.5 MILLIGRAM(S): 2 INJECTION INTRAMUSCULAR; INTRAVENOUS; SUBCUTANEOUS at 19:32

## 2023-12-16 RX ADMIN — Medication 3: at 18:44

## 2023-12-16 RX ADMIN — Medication 14 UNIT(S): at 12:55

## 2023-12-16 RX ADMIN — CARVEDILOL PHOSPHATE 3.12 MILLIGRAM(S): 80 CAPSULE, EXTENDED RELEASE ORAL at 13:38

## 2023-12-16 RX ADMIN — CARVEDILOL PHOSPHATE 3.12 MILLIGRAM(S): 80 CAPSULE, EXTENDED RELEASE ORAL at 18:46

## 2023-12-16 RX ADMIN — HYDROMORPHONE HYDROCHLORIDE 0.5 MILLIGRAM(S): 2 INJECTION INTRAMUSCULAR; INTRAVENOUS; SUBCUTANEOUS at 01:39

## 2023-12-16 RX ADMIN — Medication 5 MILLIGRAM(S): at 07:18

## 2023-12-16 RX ADMIN — INSULIN GLARGINE 50 UNIT(S): 100 INJECTION, SOLUTION SUBCUTANEOUS at 22:20

## 2023-12-16 RX ADMIN — Medication 1: at 09:22

## 2023-12-16 RX ADMIN — Medication 2: at 12:54

## 2023-12-16 RX ADMIN — LISINOPRIL 10 MILLIGRAM(S): 2.5 TABLET ORAL at 07:21

## 2023-12-16 RX ADMIN — OXYCODONE HYDROCHLORIDE 10 MILLIGRAM(S): 5 TABLET ORAL at 23:15

## 2023-12-16 RX ADMIN — Medication 14 UNIT(S): at 18:45

## 2023-12-16 RX ADMIN — HYDROMORPHONE HYDROCHLORIDE 0.5 MILLIGRAM(S): 2 INJECTION INTRAMUSCULAR; INTRAVENOUS; SUBCUTANEOUS at 02:09

## 2023-12-16 RX ADMIN — NALOXEGOL OXALATE 25 MILLIGRAM(S): 12.5 TABLET, FILM COATED ORAL at 13:39

## 2023-12-16 RX ADMIN — Medication 14 UNIT(S): at 09:22

## 2023-12-16 RX ADMIN — ACETAZOLAMIDE 500 MILLIGRAM(S): 250 TABLET ORAL at 07:18

## 2023-12-16 RX ADMIN — Medication 5 MILLIGRAM(S): at 18:47

## 2023-12-16 RX ADMIN — ACETAZOLAMIDE 500 MILLIGRAM(S): 250 TABLET ORAL at 18:46

## 2023-12-16 NOTE — PROGRESS NOTE ADULT - PROBLEM SELECTOR PLAN 1
Patient reports to have band-like headache, tinnitus, blurry vision, and photophobia, consistent with her previous episode of idiopathic intracranial HTN /pseudotumor cerebri   -Neurology and ophtho consulted, recs appreciated   -ED attempted to perform LP 10X but was not successful, Had LP on 12/11, opening pressure measured 11 cm water. Given OP is not elevated, patient's episodes of transient vision loss cannot be attributed to elevated ICP. Recommend MRI brain and orbits with and without contrast and MRV. Appreciate neuro eval. Please continue diamox   -CT head and venogram is negative for any acute pathology   -C/w Diamox  mg BID   -Unable to get MRI/MRV brain, MRI orbits w/wo contrast as pt with neurostimulator which is not MRI compatible.   -Anesthesia consulted for possible blood patch  -If pt has intracranial hypotension per radiographic studies, studies, suggest fluro-assisted EBP  -f/u head ct with contrast  -Pain control  -mild leukocytosis - cont to monitor for now

## 2023-12-16 NOTE — PROGRESS NOTE ADULT - SUBJECTIVE AND OBJECTIVE BOX
Patient is a 53y old  Female who presents with a chief complaint of Headache, tinnitus (15 Dec 2023 16:57)      INTERVAL HPI/OVERNIGHT EVENTS: NANCY overnight. This morning, c/o headaches, but responsive to pain medications.       REVIEW OF SYSTEMS:    CONSTITUTIONAL: No weakness, fevers or chills  EYES/ENT: No visual changes;  No vertigo or throat pain   NECK: No pain or stiffness  RESPIRATORY: No cough, wheezing, hemoptysis; No shortness of breath  CARDIOVASCULAR: No chest pain or palpitations  GASTROINTESTINAL: No abdominal or epigastric pain. No nausea, vomiting, or hematemesis; No diarrhea or constipation. No melena or hematochezia.  GENITOURINARY: No dysuria, frequency or hematuria  NEUROLOGICAL: No numbness or weakness  All other review of systems is negative unless indicated above.    FAMILY HISTORY:  FH: HTN (hypertension)      T(C): 36.9 (12-16-23 @ 06:50), Max: 36.9 (12-16-23 @ 06:50)  HR: 62 (12-16-23 @ 06:50) (62 - 77)  BP: 117/56 (12-16-23 @ 06:50) (103/59 - 127/63)  RR: 18 (12-16-23 @ 06:50) (18 - 18)  SpO2: 100% (12-16-23 @ 06:50) (99% - 100%)  Wt(kg): --Vital Signs Last 24 Hrs  T(C): 36.9 (16 Dec 2023 06:50), Max: 36.9 (16 Dec 2023 06:50)  T(F): 98.4 (16 Dec 2023 06:50), Max: 98.4 (16 Dec 2023 06:50)  HR: 62 (16 Dec 2023 06:50) (62 - 77)  BP: 117/56 (16 Dec 2023 06:50) (103/59 - 127/63)  BP(mean): --  RR: 18 (16 Dec 2023 06:50) (18 - 18)  SpO2: 100% (16 Dec 2023 06:50) (99% - 100%)    Parameters below as of 16 Dec 2023 06:50  Patient On (Oxygen Delivery Method): room air        PHYSICAL EXAM:  GENERAL: NAD, well-groomed, well-developed  HEAD:  Atraumatic, Normocephalic  EYES: EOMI, PERRLA, conjunctiva and sclera clear  ENMT: No tonsillar erythema, exudates, or enlargement; Moist mucous membranes, Good dentition, No lesions  NECK: Supple, No JVD, Normal thyroid  NERVOUS SYSTEM:  Alert & Oriented X3, Good concentration; Motor Strength 5/5 B/L upper and lower extremities; DTRs 2+ intact and symmetric  CHEST/LUNG: Clear to percussion bilaterally; No rales, rhonchi, wheezing, or rubs  HEART: Regular rate and rhythm; No murmurs, rubs, or gallops  ABDOMEN: Soft, Nontender, Nondistended; Bowel sounds present  EXTREMITIES:  2+ Peripheral Pulses, No clubbing, cyanosis, or edema  LYMPH: No lymphadenopathy noted  SKIN: No rashes or lesions    Consultant(s) Notes Reviewed:  [x ] YES  [ ] NO  Care Discussed with Consultants/Other Providers [ x] YES  [ ] NO    LABS:      Ca    8.8        15 Dec 2023 06:18        CAPILLARY BLOOD GLUCOSE      POCT Blood Glucose.: 271 mg/dL (15 Dec 2023 21:39)  POCT Blood Glucose.: 194 mg/dL (15 Dec 2023 18:06)  POCT Blood Glucose.: 260 mg/dL (15 Dec 2023 12:34)  POCT Blood Glucose.: 202 mg/dL (15 Dec 2023 09:13)    BLOOD CULTURE      RADIOLOGY & ADDITIONAL TESTS:    Imaging Personally Reviewed:  [ ] YES  [ ] NO  acetaminophen     Tablet .. 650 milliGRAM(s) Oral every 6 hours PRN  acetaminophen   IVPB .. 1000 milliGRAM(s) IV Intermittent once PRN  acetaZOLAMIDE    Tablet 500 milliGRAM(s) Oral two times a day  aluminum hydroxide/magnesium hydroxide/simethicone Suspension 30 milliLiter(s) Oral every 4 hours PRN  baclofen 5 milliGRAM(s) Oral every 8 hours PRN  buMETAnide 2 milliGRAM(s) Oral daily  carvedilol 3.125 milliGRAM(s) Oral every 12 hours  dextrose 5%. 1000 milliLiter(s) IV Continuous <Continuous>  dextrose 5%. 1000 milliLiter(s) IV Continuous <Continuous>  dextrose 50% Injectable 25 Gram(s) IV Push once  dextrose 50% Injectable 12.5 Gram(s) IV Push once  dextrose 50% Injectable 25 Gram(s) IV Push once  dextrose Oral Gel 15 Gram(s) Oral once PRN  glucagon  Injectable 1 milliGRAM(s) IntraMuscular once  HYDROmorphone  Injectable 0.5 milliGRAM(s) IV Push every 8 hours PRN  insulin glargine Injectable (LANTUS) 50 Unit(s) SubCutaneous at bedtime  insulin lispro (ADMELOG) corrective regimen sliding scale   SubCutaneous at bedtime  insulin lispro (ADMELOG) corrective regimen sliding scale   SubCutaneous three times a day before meals  insulin lispro Injectable (ADMELOG) 14 Unit(s) SubCutaneous three times a day before meals  lisinopril 10 milliGRAM(s) Oral daily  melatonin 3 milliGRAM(s) Oral at bedtime PRN  naloxegol 25 milliGRAM(s) Oral daily  ondansetron Injectable 4 milliGRAM(s) IV Push every 8 hours PRN  spironolactone 25 milliGRAM(s) Oral daily      HEALTH ISSUES - PROBLEM Dx:  Pseudotumor cerebri    Chronic CHF    Type 2 diabetes mellitus    Essential hypertension    Preventive measure    Medication management

## 2023-12-17 LAB
ANION GAP SERPL CALC-SCNC: 12 MMOL/L — SIGNIFICANT CHANGE UP (ref 7–14)
ANION GAP SERPL CALC-SCNC: 12 MMOL/L — SIGNIFICANT CHANGE UP (ref 7–14)
BUN SERPL-MCNC: 22 MG/DL — SIGNIFICANT CHANGE UP (ref 7–23)
BUN SERPL-MCNC: 22 MG/DL — SIGNIFICANT CHANGE UP (ref 7–23)
CALCIUM SERPL-MCNC: 9.2 MG/DL — SIGNIFICANT CHANGE UP (ref 8.4–10.5)
CALCIUM SERPL-MCNC: 9.2 MG/DL — SIGNIFICANT CHANGE UP (ref 8.4–10.5)
CHLORIDE SERPL-SCNC: 104 MMOL/L — SIGNIFICANT CHANGE UP (ref 98–107)
CHLORIDE SERPL-SCNC: 104 MMOL/L — SIGNIFICANT CHANGE UP (ref 98–107)
CO2 SERPL-SCNC: 18 MMOL/L — LOW (ref 22–31)
CO2 SERPL-SCNC: 18 MMOL/L — LOW (ref 22–31)
CREAT SERPL-MCNC: 0.94 MG/DL — SIGNIFICANT CHANGE UP (ref 0.5–1.3)
CREAT SERPL-MCNC: 0.94 MG/DL — SIGNIFICANT CHANGE UP (ref 0.5–1.3)
EGFR: 73 ML/MIN/1.73M2 — SIGNIFICANT CHANGE UP
EGFR: 73 ML/MIN/1.73M2 — SIGNIFICANT CHANGE UP
GLUCOSE BLDC GLUCOMTR-MCNC: 166 MG/DL — HIGH (ref 70–99)
GLUCOSE BLDC GLUCOMTR-MCNC: 166 MG/DL — HIGH (ref 70–99)
GLUCOSE BLDC GLUCOMTR-MCNC: 210 MG/DL — HIGH (ref 70–99)
GLUCOSE BLDC GLUCOMTR-MCNC: 210 MG/DL — HIGH (ref 70–99)
GLUCOSE BLDC GLUCOMTR-MCNC: 230 MG/DL — HIGH (ref 70–99)
GLUCOSE BLDC GLUCOMTR-MCNC: 230 MG/DL — HIGH (ref 70–99)
GLUCOSE BLDC GLUCOMTR-MCNC: 275 MG/DL — HIGH (ref 70–99)
GLUCOSE BLDC GLUCOMTR-MCNC: 275 MG/DL — HIGH (ref 70–99)
GLUCOSE SERPL-MCNC: 238 MG/DL — HIGH (ref 70–99)
GLUCOSE SERPL-MCNC: 238 MG/DL — HIGH (ref 70–99)
HCT VFR BLD CALC: 38.6 % — SIGNIFICANT CHANGE UP (ref 34.5–45)
HCT VFR BLD CALC: 38.6 % — SIGNIFICANT CHANGE UP (ref 34.5–45)
HGB BLD-MCNC: 12.7 G/DL — SIGNIFICANT CHANGE UP (ref 11.5–15.5)
HGB BLD-MCNC: 12.7 G/DL — SIGNIFICANT CHANGE UP (ref 11.5–15.5)
MAGNESIUM SERPL-MCNC: 1.9 MG/DL — SIGNIFICANT CHANGE UP (ref 1.6–2.6)
MAGNESIUM SERPL-MCNC: 1.9 MG/DL — SIGNIFICANT CHANGE UP (ref 1.6–2.6)
MCHC RBC-ENTMCNC: 30.4 PG — SIGNIFICANT CHANGE UP (ref 27–34)
MCHC RBC-ENTMCNC: 30.4 PG — SIGNIFICANT CHANGE UP (ref 27–34)
MCHC RBC-ENTMCNC: 32.9 GM/DL — SIGNIFICANT CHANGE UP (ref 32–36)
MCHC RBC-ENTMCNC: 32.9 GM/DL — SIGNIFICANT CHANGE UP (ref 32–36)
MCV RBC AUTO: 92.3 FL — SIGNIFICANT CHANGE UP (ref 80–100)
MCV RBC AUTO: 92.3 FL — SIGNIFICANT CHANGE UP (ref 80–100)
NRBC # BLD: 0 /100 WBCS — SIGNIFICANT CHANGE UP (ref 0–0)
NRBC # BLD: 0 /100 WBCS — SIGNIFICANT CHANGE UP (ref 0–0)
NRBC # FLD: 0 K/UL — SIGNIFICANT CHANGE UP (ref 0–0)
NRBC # FLD: 0 K/UL — SIGNIFICANT CHANGE UP (ref 0–0)
PHOSPHATE SERPL-MCNC: 4.2 MG/DL — SIGNIFICANT CHANGE UP (ref 2.5–4.5)
PHOSPHATE SERPL-MCNC: 4.2 MG/DL — SIGNIFICANT CHANGE UP (ref 2.5–4.5)
PLATELET # BLD AUTO: 503 K/UL — HIGH (ref 150–400)
PLATELET # BLD AUTO: 503 K/UL — HIGH (ref 150–400)
POTASSIUM SERPL-MCNC: 3.9 MMOL/L — SIGNIFICANT CHANGE UP (ref 3.5–5.3)
POTASSIUM SERPL-MCNC: 3.9 MMOL/L — SIGNIFICANT CHANGE UP (ref 3.5–5.3)
POTASSIUM SERPL-SCNC: 3.9 MMOL/L — SIGNIFICANT CHANGE UP (ref 3.5–5.3)
POTASSIUM SERPL-SCNC: 3.9 MMOL/L — SIGNIFICANT CHANGE UP (ref 3.5–5.3)
RBC # BLD: 4.18 M/UL — SIGNIFICANT CHANGE UP (ref 3.8–5.2)
RBC # BLD: 4.18 M/UL — SIGNIFICANT CHANGE UP (ref 3.8–5.2)
RBC # FLD: 13.2 % — SIGNIFICANT CHANGE UP (ref 10.3–14.5)
RBC # FLD: 13.2 % — SIGNIFICANT CHANGE UP (ref 10.3–14.5)
SODIUM SERPL-SCNC: 134 MMOL/L — LOW (ref 135–145)
SODIUM SERPL-SCNC: 134 MMOL/L — LOW (ref 135–145)
WBC # BLD: 11.93 K/UL — HIGH (ref 3.8–10.5)
WBC # BLD: 11.93 K/UL — HIGH (ref 3.8–10.5)
WBC # FLD AUTO: 11.93 K/UL — HIGH (ref 3.8–10.5)
WBC # FLD AUTO: 11.93 K/UL — HIGH (ref 3.8–10.5)

## 2023-12-17 PROCEDURE — 99233 SBSQ HOSP IP/OBS HIGH 50: CPT

## 2023-12-17 RX ORDER — LANOLIN ALCOHOL/MO/W.PET/CERES
3 CREAM (GRAM) TOPICAL ONCE
Refills: 0 | Status: COMPLETED | OUTPATIENT
Start: 2023-12-17 | End: 2023-12-17

## 2023-12-17 RX ORDER — INSULIN LISPRO 100/ML
15 VIAL (ML) SUBCUTANEOUS
Refills: 0 | Status: DISCONTINUED | OUTPATIENT
Start: 2023-12-17 | End: 2023-12-22

## 2023-12-17 RX ORDER — INSULIN LISPRO 100/ML
15 VIAL (ML) SUBCUTANEOUS ONCE
Refills: 0 | Status: COMPLETED | OUTPATIENT
Start: 2023-12-17 | End: 2023-12-17

## 2023-12-17 RX ORDER — INSULIN GLARGINE 100 [IU]/ML
53 INJECTION, SOLUTION SUBCUTANEOUS AT BEDTIME
Refills: 0 | Status: DISCONTINUED | OUTPATIENT
Start: 2023-12-17 | End: 2023-12-18

## 2023-12-17 RX ADMIN — ACETAZOLAMIDE 500 MILLIGRAM(S): 250 TABLET ORAL at 07:02

## 2023-12-17 RX ADMIN — Medication 3 MILLIGRAM(S): at 00:40

## 2023-12-17 RX ADMIN — HYDROMORPHONE HYDROCHLORIDE 0.5 MILLIGRAM(S): 2 INJECTION INTRAMUSCULAR; INTRAVENOUS; SUBCUTANEOUS at 07:39

## 2023-12-17 RX ADMIN — Medication 3 MILLIGRAM(S): at 01:30

## 2023-12-17 RX ADMIN — Medication 15 UNIT(S): at 18:35

## 2023-12-17 RX ADMIN — Medication 2: at 18:35

## 2023-12-17 RX ADMIN — OXYCODONE HYDROCHLORIDE 10 MILLIGRAM(S): 5 TABLET ORAL at 00:15

## 2023-12-17 RX ADMIN — CARVEDILOL PHOSPHATE 3.12 MILLIGRAM(S): 80 CAPSULE, EXTENDED RELEASE ORAL at 07:03

## 2023-12-17 RX ADMIN — OXYCODONE HYDROCHLORIDE 10 MILLIGRAM(S): 5 TABLET ORAL at 09:09

## 2023-12-17 RX ADMIN — SPIRONOLACTONE 25 MILLIGRAM(S): 25 TABLET, FILM COATED ORAL at 07:02

## 2023-12-17 RX ADMIN — HYDROMORPHONE HYDROCHLORIDE 0.5 MILLIGRAM(S): 2 INJECTION INTRAMUSCULAR; INTRAVENOUS; SUBCUTANEOUS at 15:39

## 2023-12-17 RX ADMIN — NALOXEGOL OXALATE 25 MILLIGRAM(S): 12.5 TABLET, FILM COATED ORAL at 12:58

## 2023-12-17 RX ADMIN — OXYCODONE HYDROCHLORIDE 10 MILLIGRAM(S): 5 TABLET ORAL at 19:00

## 2023-12-17 RX ADMIN — OXYCODONE HYDROCHLORIDE 10 MILLIGRAM(S): 5 TABLET ORAL at 01:19

## 2023-12-17 RX ADMIN — Medication 15 UNIT(S): at 12:59

## 2023-12-17 RX ADMIN — Medication 3: at 12:58

## 2023-12-17 RX ADMIN — BUMETANIDE 2 MILLIGRAM(S): 0.25 INJECTION INTRAMUSCULAR; INTRAVENOUS at 07:02

## 2023-12-17 RX ADMIN — ACETAZOLAMIDE 500 MILLIGRAM(S): 250 TABLET ORAL at 17:36

## 2023-12-17 RX ADMIN — Medication 2: at 09:16

## 2023-12-17 RX ADMIN — INSULIN GLARGINE 53 UNIT(S): 100 INJECTION, SOLUTION SUBCUTANEOUS at 22:15

## 2023-12-17 RX ADMIN — LISINOPRIL 10 MILLIGRAM(S): 2.5 TABLET ORAL at 07:02

## 2023-12-17 RX ADMIN — OXYCODONE HYDROCHLORIDE 10 MILLIGRAM(S): 5 TABLET ORAL at 20:02

## 2023-12-17 RX ADMIN — HYDROMORPHONE HYDROCHLORIDE 0.5 MILLIGRAM(S): 2 INJECTION INTRAMUSCULAR; INTRAVENOUS; SUBCUTANEOUS at 07:09

## 2023-12-17 RX ADMIN — HYDROMORPHONE HYDROCHLORIDE 0.5 MILLIGRAM(S): 2 INJECTION INTRAMUSCULAR; INTRAVENOUS; SUBCUTANEOUS at 16:00

## 2023-12-17 RX ADMIN — Medication 15 UNIT(S): at 09:17

## 2023-12-17 NOTE — PROGRESS NOTE ADULT - PROBLEM SELECTOR PLAN 1
Patient reports to have band-like headache, tinnitus, blurry vision, and photophobia, consistent with her previous episode of idiopathic intracranial HTN /pseudotumor cerebri   -Neurology and ophtho consulted, recs appreciated   -ED attempted to perform LP 10X but was not successful, Had LP on 12/11, opening pressure measured 11 cm water. Given OP is not elevated, patient's episodes of transient vision loss cannot be attributed to elevated ICP. Recommend MRI brain and orbits with and without contrast and MRV. Appreciate neuro eval. Please continue diamox   -CT head and venogram is negative for any acute pathology   -C/w Diamox  mg BID   -Unable to get MRI/MRV brain, MRI orbits w/wo contrast as pt with neurostimulator which is not MRI compatible.   -Anesthesia consulted for possible blood patch  -If pt has intracranial hypotension per radiographic studies, studies, suggest fluro-assisted EBP  -Head ct with contrast: Expanded sella turcica with CSF attenuation due to near-complete empty sella versus underlying arachnoid cyst. Findings may be seen in the setting of intracranial hypertension.  -Pain control

## 2023-12-17 NOTE — PROGRESS NOTE ADULT - SUBJECTIVE AND OBJECTIVE BOX
Patient is a 53y old  Female who presents with a chief complaint of Headache, tinnitus (16 Dec 2023 07:48)      INTERVAL HPI/OVERNIGHT EVENTS: NANCY overnight. This morning, pt without acute medical complaints.       REVIEW OF SYSTEMS:    CONSTITUTIONAL: No weakness, fevers or chills  EYES/ENT: No visual changes;  No vertigo or throat pain   NECK: No pain or stiffness  RESPIRATORY: No cough, wheezing, hemoptysis; No shortness of breath  CARDIOVASCULAR: No chest pain or palpitations  GASTROINTESTINAL: No abdominal or epigastric pain. No nausea, vomiting, or hematemesis; No diarrhea or constipation. No melena or hematochezia.  GENITOURINARY: No dysuria, frequency or hematuria  NEUROLOGICAL: No numbness or weakness  All other review of systems is negative unless indicated above.    FAMILY HISTORY:  FH: HTN (hypertension)      T(C): 36.8 (12-17-23 @ 06:53), Max: 37.1 (12-16-23 @ 21:52)  HR: 84 (12-17-23 @ 06:53) (65 - 84)  BP: 119/50 (12-17-23 @ 06:53) (115/53 - 160/100)  RR: 18 (12-17-23 @ 06:53) (18 - 18)  SpO2: 100% (12-17-23 @ 06:53) (100% - 100%)  Wt(kg): --Vital Signs Last 24 Hrs  T(C): 36.8 (17 Dec 2023 06:53), Max: 37.1 (16 Dec 2023 21:52)  T(F): 98.3 (17 Dec 2023 06:53), Max: 98.7 (16 Dec 2023 21:52)  HR: 84 (17 Dec 2023 06:53) (65 - 84)  BP: 119/50 (17 Dec 2023 06:53) (115/53 - 160/100)  BP(mean): --  RR: 18 (17 Dec 2023 06:53) (18 - 18)  SpO2: 100% (17 Dec 2023 06:53) (100% - 100%)    Parameters below as of 17 Dec 2023 06:53  Patient On (Oxygen Delivery Method): room air        PHYSICAL EXAM:  GENERAL: NAD, well-groomed, well-developed  HEAD:  Atraumatic, Normocephalic  EYES: EOMI, PERRLA, conjunctiva and sclera clear  ENMT: No tonsillar erythema, exudates, or enlargement; Moist mucous membranes, Good dentition, No lesions  NECK: Supple, No JVD, Normal thyroid  NERVOUS SYSTEM:  Alert & Oriented X3, Good concentration  CHEST/LUNG: Clear to percussion bilaterally; No rales, rhonchi, wheezing, or rubs  HEART: Regular rate and rhythm; No murmurs, rubs, or gallops  ABDOMEN: Soft, Nontender, Nondistended; Bowel sounds present  EXTREMITIES:  2+ Peripheral Pulses, No clubbing, cyanosis, or edema  LYMPH: No lymphadenopathy noted  SKIN: No rashes or lesions    Consultant(s) Notes Reviewed:  [x ] YES  [ ] NO  Care Discussed with Consultants/Other Providers [ x] YES  [ ] NO    LABS:      Ca    9.1        16 Dec 2023 07:35        CAPILLARY BLOOD GLUCOSE      POCT Blood Glucose.: 200 mg/dL (16 Dec 2023 21:45)  POCT Blood Glucose.: 262 mg/dL (16 Dec 2023 17:46)  POCT Blood Glucose.: 239 mg/dL (16 Dec 2023 12:37)  POCT Blood Glucose.: 193 mg/dL (16 Dec 2023 08:59)    BLOOD CULTURE      RADIOLOGY & ADDITIONAL TESTS:    Imaging Personally Reviewed:  [ ] YES  [ ] NO  acetaminophen     Tablet .. 650 milliGRAM(s) Oral every 6 hours PRN  acetaminophen   IVPB .. 1000 milliGRAM(s) IV Intermittent once PRN  acetaZOLAMIDE    Tablet 500 milliGRAM(s) Oral two times a day  aluminum hydroxide/magnesium hydroxide/simethicone Suspension 30 milliLiter(s) Oral every 4 hours PRN  baclofen 5 milliGRAM(s) Oral every 8 hours PRN  buMETAnide 2 milliGRAM(s) Oral daily  carvedilol 3.125 milliGRAM(s) Oral every 12 hours  dextrose 5%. 1000 milliLiter(s) IV Continuous <Continuous>  dextrose 5%. 1000 milliLiter(s) IV Continuous <Continuous>  dextrose 50% Injectable 25 Gram(s) IV Push once  dextrose 50% Injectable 12.5 Gram(s) IV Push once  dextrose 50% Injectable 25 Gram(s) IV Push once  dextrose Oral Gel 15 Gram(s) Oral once PRN  glucagon  Injectable 1 milliGRAM(s) IntraMuscular once  HYDROmorphone  Injectable 0.5 milliGRAM(s) IV Push every 8 hours PRN  insulin glargine Injectable (LANTUS) 50 Unit(s) SubCutaneous at bedtime  insulin lispro (ADMELOG) corrective regimen sliding scale   SubCutaneous at bedtime  insulin lispro (ADMELOG) corrective regimen sliding scale   SubCutaneous three times a day before meals  insulin lispro Injectable (ADMELOG) 14 Unit(s) SubCutaneous three times a day before meals  lisinopril 10 milliGRAM(s) Oral daily  melatonin 3 milliGRAM(s) Oral at bedtime PRN  naloxegol 25 milliGRAM(s) Oral daily  ondansetron Injectable 4 milliGRAM(s) IV Push every 8 hours PRN  oxyCODONE    IR 10 milliGRAM(s) Oral every 6 hours PRN  spironolactone 25 milliGRAM(s) Oral daily      HEALTH ISSUES - PROBLEM Dx:  Pseudotumor cerebri    Chronic CHF    Type 2 diabetes mellitus    Essential hypertension    Preventive measure    Medication management

## 2023-12-18 LAB
ANION GAP SERPL CALC-SCNC: 15 MMOL/L — HIGH (ref 7–14)
ANION GAP SERPL CALC-SCNC: 15 MMOL/L — HIGH (ref 7–14)
BUN SERPL-MCNC: 22 MG/DL — SIGNIFICANT CHANGE UP (ref 7–23)
BUN SERPL-MCNC: 22 MG/DL — SIGNIFICANT CHANGE UP (ref 7–23)
CALCIUM SERPL-MCNC: 9.1 MG/DL — SIGNIFICANT CHANGE UP (ref 8.4–10.5)
CALCIUM SERPL-MCNC: 9.1 MG/DL — SIGNIFICANT CHANGE UP (ref 8.4–10.5)
CHLORIDE SERPL-SCNC: 103 MMOL/L — SIGNIFICANT CHANGE UP (ref 98–107)
CHLORIDE SERPL-SCNC: 103 MMOL/L — SIGNIFICANT CHANGE UP (ref 98–107)
CO2 SERPL-SCNC: 18 MMOL/L — LOW (ref 22–31)
CO2 SERPL-SCNC: 18 MMOL/L — LOW (ref 22–31)
CREAT SERPL-MCNC: 0.83 MG/DL — SIGNIFICANT CHANGE UP (ref 0.5–1.3)
CREAT SERPL-MCNC: 0.83 MG/DL — SIGNIFICANT CHANGE UP (ref 0.5–1.3)
EGFR: 84 ML/MIN/1.73M2 — SIGNIFICANT CHANGE UP
EGFR: 84 ML/MIN/1.73M2 — SIGNIFICANT CHANGE UP
GLUCOSE BLDC GLUCOMTR-MCNC: 130 MG/DL — HIGH (ref 70–99)
GLUCOSE BLDC GLUCOMTR-MCNC: 130 MG/DL — HIGH (ref 70–99)
GLUCOSE BLDC GLUCOMTR-MCNC: 149 MG/DL — HIGH (ref 70–99)
GLUCOSE BLDC GLUCOMTR-MCNC: 149 MG/DL — HIGH (ref 70–99)
GLUCOSE BLDC GLUCOMTR-MCNC: 156 MG/DL — HIGH (ref 70–99)
GLUCOSE BLDC GLUCOMTR-MCNC: 156 MG/DL — HIGH (ref 70–99)
GLUCOSE BLDC GLUCOMTR-MCNC: 193 MG/DL — HIGH (ref 70–99)
GLUCOSE BLDC GLUCOMTR-MCNC: 193 MG/DL — HIGH (ref 70–99)
GLUCOSE BLDC GLUCOMTR-MCNC: 229 MG/DL — HIGH (ref 70–99)
GLUCOSE BLDC GLUCOMTR-MCNC: 229 MG/DL — HIGH (ref 70–99)
GLUCOSE BLDC GLUCOMTR-MCNC: 265 MG/DL — HIGH (ref 70–99)
GLUCOSE BLDC GLUCOMTR-MCNC: 265 MG/DL — HIGH (ref 70–99)
GLUCOSE SERPL-MCNC: 180 MG/DL — HIGH (ref 70–99)
GLUCOSE SERPL-MCNC: 180 MG/DL — HIGH (ref 70–99)
HCT VFR BLD CALC: 38.1 % — SIGNIFICANT CHANGE UP (ref 34.5–45)
HCT VFR BLD CALC: 38.1 % — SIGNIFICANT CHANGE UP (ref 34.5–45)
HGB BLD-MCNC: 12.5 G/DL — SIGNIFICANT CHANGE UP (ref 11.5–15.5)
HGB BLD-MCNC: 12.5 G/DL — SIGNIFICANT CHANGE UP (ref 11.5–15.5)
MAGNESIUM SERPL-MCNC: 1.9 MG/DL — SIGNIFICANT CHANGE UP (ref 1.6–2.6)
MAGNESIUM SERPL-MCNC: 1.9 MG/DL — SIGNIFICANT CHANGE UP (ref 1.6–2.6)
MCHC RBC-ENTMCNC: 29.5 PG — SIGNIFICANT CHANGE UP (ref 27–34)
MCHC RBC-ENTMCNC: 29.5 PG — SIGNIFICANT CHANGE UP (ref 27–34)
MCHC RBC-ENTMCNC: 32.8 GM/DL — SIGNIFICANT CHANGE UP (ref 32–36)
MCHC RBC-ENTMCNC: 32.8 GM/DL — SIGNIFICANT CHANGE UP (ref 32–36)
MCV RBC AUTO: 89.9 FL — SIGNIFICANT CHANGE UP (ref 80–100)
MCV RBC AUTO: 89.9 FL — SIGNIFICANT CHANGE UP (ref 80–100)
NRBC # BLD: 0 /100 WBCS — SIGNIFICANT CHANGE UP (ref 0–0)
NRBC # BLD: 0 /100 WBCS — SIGNIFICANT CHANGE UP (ref 0–0)
NRBC # FLD: 0 K/UL — SIGNIFICANT CHANGE UP (ref 0–0)
NRBC # FLD: 0 K/UL — SIGNIFICANT CHANGE UP (ref 0–0)
PHOSPHATE SERPL-MCNC: 3.9 MG/DL — SIGNIFICANT CHANGE UP (ref 2.5–4.5)
PHOSPHATE SERPL-MCNC: 3.9 MG/DL — SIGNIFICANT CHANGE UP (ref 2.5–4.5)
PLATELET # BLD AUTO: 465 K/UL — HIGH (ref 150–400)
PLATELET # BLD AUTO: 465 K/UL — HIGH (ref 150–400)
POTASSIUM SERPL-MCNC: 3.7 MMOL/L — SIGNIFICANT CHANGE UP (ref 3.5–5.3)
POTASSIUM SERPL-MCNC: 3.7 MMOL/L — SIGNIFICANT CHANGE UP (ref 3.5–5.3)
POTASSIUM SERPL-SCNC: 3.7 MMOL/L — SIGNIFICANT CHANGE UP (ref 3.5–5.3)
POTASSIUM SERPL-SCNC: 3.7 MMOL/L — SIGNIFICANT CHANGE UP (ref 3.5–5.3)
RBC # BLD: 4.24 M/UL — SIGNIFICANT CHANGE UP (ref 3.8–5.2)
RBC # BLD: 4.24 M/UL — SIGNIFICANT CHANGE UP (ref 3.8–5.2)
RBC # FLD: 13.2 % — SIGNIFICANT CHANGE UP (ref 10.3–14.5)
RBC # FLD: 13.2 % — SIGNIFICANT CHANGE UP (ref 10.3–14.5)
SODIUM SERPL-SCNC: 136 MMOL/L — SIGNIFICANT CHANGE UP (ref 135–145)
SODIUM SERPL-SCNC: 136 MMOL/L — SIGNIFICANT CHANGE UP (ref 135–145)
WBC # BLD: 11.47 K/UL — HIGH (ref 3.8–10.5)
WBC # BLD: 11.47 K/UL — HIGH (ref 3.8–10.5)
WBC # FLD AUTO: 11.47 K/UL — HIGH (ref 3.8–10.5)
WBC # FLD AUTO: 11.47 K/UL — HIGH (ref 3.8–10.5)

## 2023-12-18 PROCEDURE — 99232 SBSQ HOSP IP/OBS MODERATE 35: CPT

## 2023-12-18 PROCEDURE — 99233 SBSQ HOSP IP/OBS HIGH 50: CPT

## 2023-12-18 RX ORDER — LANOLIN ALCOHOL/MO/W.PET/CERES
3 CREAM (GRAM) TOPICAL ONCE
Refills: 0 | Status: COMPLETED | OUTPATIENT
Start: 2023-12-18 | End: 2023-12-18

## 2023-12-18 RX ORDER — INSULIN GLARGINE 100 [IU]/ML
45 INJECTION, SOLUTION SUBCUTANEOUS ONCE
Refills: 0 | Status: COMPLETED | OUTPATIENT
Start: 2023-12-18 | End: 2023-12-18

## 2023-12-18 RX ADMIN — Medication 15 UNIT(S): at 10:25

## 2023-12-18 RX ADMIN — CARVEDILOL PHOSPHATE 3.12 MILLIGRAM(S): 80 CAPSULE, EXTENDED RELEASE ORAL at 06:37

## 2023-12-18 RX ADMIN — OXYCODONE HYDROCHLORIDE 10 MILLIGRAM(S): 5 TABLET ORAL at 08:25

## 2023-12-18 RX ADMIN — ACETAZOLAMIDE 500 MILLIGRAM(S): 250 TABLET ORAL at 18:19

## 2023-12-18 RX ADMIN — SPIRONOLACTONE 25 MILLIGRAM(S): 25 TABLET, FILM COATED ORAL at 06:36

## 2023-12-18 RX ADMIN — Medication 3 MILLIGRAM(S): at 00:24

## 2023-12-18 RX ADMIN — HYDROMORPHONE HYDROCHLORIDE 0.5 MILLIGRAM(S): 2 INJECTION INTRAMUSCULAR; INTRAVENOUS; SUBCUTANEOUS at 18:50

## 2023-12-18 RX ADMIN — HYDROMORPHONE HYDROCHLORIDE 0.5 MILLIGRAM(S): 2 INJECTION INTRAMUSCULAR; INTRAVENOUS; SUBCUTANEOUS at 10:37

## 2023-12-18 RX ADMIN — CARVEDILOL PHOSPHATE 3.12 MILLIGRAM(S): 80 CAPSULE, EXTENDED RELEASE ORAL at 18:20

## 2023-12-18 RX ADMIN — OXYCODONE HYDROCHLORIDE 10 MILLIGRAM(S): 5 TABLET ORAL at 07:55

## 2023-12-18 RX ADMIN — HYDROMORPHONE HYDROCHLORIDE 0.5 MILLIGRAM(S): 2 INJECTION INTRAMUSCULAR; INTRAVENOUS; SUBCUTANEOUS at 00:25

## 2023-12-18 RX ADMIN — Medication 5 MILLIGRAM(S): at 14:10

## 2023-12-18 RX ADMIN — BUMETANIDE 2 MILLIGRAM(S): 0.25 INJECTION INTRAMUSCULAR; INTRAVENOUS at 06:37

## 2023-12-18 RX ADMIN — Medication 2: at 13:36

## 2023-12-18 RX ADMIN — HYDROMORPHONE HYDROCHLORIDE 0.5 MILLIGRAM(S): 2 INJECTION INTRAMUSCULAR; INTRAVENOUS; SUBCUTANEOUS at 00:55

## 2023-12-18 RX ADMIN — OXYCODONE HYDROCHLORIDE 10 MILLIGRAM(S): 5 TABLET ORAL at 14:39

## 2023-12-18 RX ADMIN — INSULIN GLARGINE 45 UNIT(S): 100 INJECTION, SOLUTION SUBCUTANEOUS at 21:50

## 2023-12-18 RX ADMIN — Medication 3: at 10:26

## 2023-12-18 RX ADMIN — OXYCODONE HYDROCHLORIDE 10 MILLIGRAM(S): 5 TABLET ORAL at 14:09

## 2023-12-18 RX ADMIN — Medication 3 MILLIGRAM(S): at 00:59

## 2023-12-18 RX ADMIN — HYDROMORPHONE HYDROCHLORIDE 0.5 MILLIGRAM(S): 2 INJECTION INTRAMUSCULAR; INTRAVENOUS; SUBCUTANEOUS at 10:07

## 2023-12-18 RX ADMIN — HYDROMORPHONE HYDROCHLORIDE 0.5 MILLIGRAM(S): 2 INJECTION INTRAMUSCULAR; INTRAVENOUS; SUBCUTANEOUS at 18:20

## 2023-12-18 RX ADMIN — NALOXEGOL OXALATE 25 MILLIGRAM(S): 12.5 TABLET, FILM COATED ORAL at 14:09

## 2023-12-18 RX ADMIN — ACETAZOLAMIDE 500 MILLIGRAM(S): 250 TABLET ORAL at 06:37

## 2023-12-18 RX ADMIN — Medication 15 UNIT(S): at 13:39

## 2023-12-18 RX ADMIN — Medication 15 UNIT(S): at 18:20

## 2023-12-18 RX ADMIN — LISINOPRIL 10 MILLIGRAM(S): 2.5 TABLET ORAL at 06:36

## 2023-12-18 RX ADMIN — OXYCODONE HYDROCHLORIDE 10 MILLIGRAM(S): 5 TABLET ORAL at 23:42

## 2023-12-18 NOTE — PROGRESS NOTE ADULT - ASSESSMENT
52 yo F who reports she has hx of pseudomotor cerebri, dx 4 years ago. At the time a therapeutic LP was done and patient had relief of headache. She was prescribed diamox and doing well on it until 3 or so weeks ago. Patient reports 3 weeks ago, she began having symptoms again, and more intense than prior. She reports ringing in her ears, described as a whooshing sound like "watermelons in a washing machine" and that she hears her heartbeat in her ears. She reports every move she makes is extremely painful. If she is lying perfectly still, she may have some relief of her symptoms for a bit, but any movement of her head results in pain. Patient also reports new sensitivity to light and reports she has been having new onset symptoms of lights flashing in her eyes, lasting just a few seconds before disappearing. She reports pain with eye movement to the extremes on horizontal or vertical gaze and pain with eye movement back to center. She has been unsteadier on her feet as well due to the pain in her head. She is on baclofen, carvedilol, bumex, spironolactone lisinopril, insulin, admelog, metformin. (In chart, active orders include oxycodone, hydromorphone, ofirmev, baclofen 5q8, naloxegol). She has hx intercoastal neuralgia, CRPS type 1, and pseudotumor cerebri. Patient came in to the [facility] ED and was transferred to [facility] ED. LP was attempted in ED but was reportedly a dry tap. Patient then had LP under fluoro, with opening pressure reported to be 11. Patient reports her headache has been worse since the lumbar punctures. Patient states she is unable to get an MRI due to her spinal stimulator although she got MRI in the past even with the stim in place.     Impression:  52 yo F, with obesity, with hx CRPS, intercostal neuralgia, IIH, on diamox, who presented to ED for complaints of worsening headache over 3 weeks, with visual complaints. LP showed opening pressure of 11. Patient continues to complain of severe pain associated with pulsatile sounds, visual obscurations. Pain may improve with lying down but movements exacerbate pain. Differentials include intracranial hypotension/post dural puncture headache vs IIH vs migraine or other cause of headache.     Plan:   [] Reached out to neurology pain specialist as to patient's option and help with further plan of care.  [] will update recommendations after discussion with pain specialist.    Case and plan not final until attending attestation.   52 yo F who reports she has hx of pseudomotor cerebri, dx 4 years ago. At the time a therapeutic LP was done and patient had relief of headache. She was prescribed diamox and doing well on it until 3 or so weeks ago. Patient reports 3 weeks ago, she began having symptoms again, and more intense than prior. She reports ringing in her ears, described as a whooshing sound like "watermelons in a washing machine" and that she hears her heartbeat in her ears. She reports every move she makes is extremely painful. If she is lying perfectly still, she may have some relief of her symptoms for a bit, but any movement of her head results in pain. Patient also reports new sensitivity to light and reports she has been having new onset symptoms of lights flashing in her eyes, lasting just a few seconds before disappearing. She reports pain with eye movement to the extremes on horizontal or vertical gaze and pain with eye movement back to center. She has been unsteadier on her feet as well due to the pain in her head. She is on baclofen, carvedilol, bumex, spironolactone lisinopril, insulin, admelog, metformin. (In chart, active orders include oxycodone, hydromorphone, ofirmev, baclofen 5q8, naloxegol). She has hx intercoastal neuralgia, CRPS type 1, and pseudotumor cerebri. Patient came in to the [facility] ED and was transferred to [facility] ED. LP was attempted in ED but was reportedly a dry tap. Patient then had LP under fluoro, with opening pressure reported to be 11. Patient reports her headache has been worse since the lumbar punctures. Patient states she is unable to get an MRI due to her spinal stimulator although she got MRI in the past even with the stim in place.     Impression:  52 yo F, with obesity, with hx CRPS, intercostal neuralgia, IIH, on diamox, who presented to ED for complaints of worsening headache over 3 weeks, with visual complaints. LP showed opening pressure of 11. Patient continues to complain of severe pain associated with pulsatile sounds, visual obscurations. Pain may improve with lying down but movements exacerbate pain. Differentials include intracranial hypotension/post dural puncture headache vs IIH vs migraine or other cause of headache.     Plan:   [] Reached out to neurology pain specialist as to patient's option and help with further plan of care.  [] will update recommendations after discussion with pain specialist.    Case and plan not final until attending attestation.   52 yo F who reports she has hx of pseudomotor cerebri, dx 4 years ago. At the time a therapeutic LP was done and patient had relief of headache. She was prescribed diamox and doing well on it until 3 or so weeks ago. Patient reports 3 weeks ago, she began having symptoms again, and more intense than prior. She reports ringing in her ears, described as a whooshing sound like "watermelons in a washing machine" and that she hears her heartbeat in her ears. She reports every move she makes is extremely painful. If she is lying perfectly still, she may have some relief of her symptoms for a bit, but any movement of her head results in pain. Patient also reports new sensitivity to light and reports she has been having new onset symptoms of lights flashing in her eyes, lasting just a few seconds before disappearing. She reports pain with eye movement to the extremes on horizontal or vertical gaze and pain with eye movement back to center. She has been unsteadier on her feet as well due to the pain in her head. She is on baclofen, carvedilol, bumex, spironolactone lisinopril, insulin, admelog, metformin. (In chart, active orders include oxycodone, hydromorphone, ofirmev, baclofen 5q8, naloxegol). She has hx intercoastal neuralgia, CRPS type 1, and pseudotumor cerebri. Patient came in to the [facility] ED and was transferred to [facility] ED. LP was attempted in ED but was reportedly a dry tap. Patient then had LP under fluoro, with opening pressure reported to be 11. Patient reports her headache has been worse since the lumbar punctures. Patient states she is unable to get an MRI due to her spinal stimulator although she got MRI in the past even with the stim in place.     Impression:  52 yo F, with obesity, with hx CRPS, intercostal neuralgia, IIH, on diamox, who presented to ED for complaints of worsening headache over 3 weeks, with visual complaints. LP showed opening pressure of 11. Patient continues to complain of severe pain associated with pulsatile sounds, visual obscurations. Pain may improve with lying down but movements exacerbate pain. Differentials include intracranial hypotension/post dural puncture headache vs IIH vs migraine or other cause of headache.      Plan:   [] Reached out to neurology pain specialist as to patient's option and help with further plan of care.  [] will update recommendations after discussion with pain specialist.    Case and plan not final until attending attestation.   54 yo F who reports she has hx of pseudomotor cerebri, dx 4 years ago. At the time a therapeutic LP was done and patient had relief of headache. She was prescribed diamox and doing well on it until 3 or so weeks ago. Patient reports 3 weeks ago, she began having symptoms again, and more intense than prior. She reports ringing in her ears, described as a whooshing sound like "watermelons in a washing machine" and that she hears her heartbeat in her ears. She reports every move she makes is extremely painful. If she is lying perfectly still, she may have some relief of her symptoms for a bit, but any movement of her head results in pain. Patient also reports new sensitivity to light and reports she has been having new onset symptoms of lights flashing in her eyes, lasting just a few seconds before disappearing. She reports pain with eye movement to the extremes on horizontal or vertical gaze and pain with eye movement back to center. She has been unsteadier on her feet as well due to the pain in her head. She is on baclofen, carvedilol, bumex, spironolactone lisinopril, insulin, admelog, metformin. (In chart, active orders include oxycodone, hydromorphone, ofirmev, baclofen 5q8, naloxegol). She has hx intercoastal neuralgia, CRPS type 1, and pseudotumor cerebri. Patient came in to the [facility] ED and was transferred to [facility] ED. LP was attempted in ED but was reportedly a dry tap. Patient then had LP under fluoro, with opening pressure reported to be 11. Patient reports her headache has been worse since the lumbar punctures. Patient states she is unable to get an MRI due to her spinal stimulator although she got MRI in the past even with the stim in place.     Impression:  54 yo F, with obesity, with hx CRPS, intercostal neuralgia, IIH, on diamox, who presented to ED for complaints of worsening headache over 3 weeks, with visual complaints. LP showed opening pressure of 11. Patient continues to complain of severe pain associated with pulsatile sounds, visual obscurations. Pain may improve with lying down but movements exacerbate pain. Differentials include intracranial hypotension/post dural puncture headache vs IIH vs migraine or other cause of headache.      Plan:   [] Reached out to neurology pain specialist as to patient's option and help with further plan of care.  [] will update recommendations after discussion with pain specialist.    Case and plan not final until attending attestation.

## 2023-12-18 NOTE — PROGRESS NOTE ADULT - SUBJECTIVE AND OBJECTIVE BOX
Neurology Progress Note    SUBJECTIVE/OBJECTIVE/INTERVAL EVENTS: Patient seen and examined at bedside w/ neuro attending and team.     54 yo F who reports she has hx of pseudomotor cerebri, dx 4 years ago. At the time a therapeutic LP was done and patient had relief of headache. She was prescribed diamox and doing well on it until 3 or so weeks ago. Patient reports 3 weeks ago, she began having symptoms again, and more intense than prior. She reports ringing in her ears, described as a whooshing sound like "watermelons in a washing machine" and that she hears her heartbeat in her ears. She reports every move she makes is extremely painful. If she is lying perfectly still, she may have some relief of her symptoms for a bit, but any movement of her head results in pain.     INTERVAL HISTORY: Patient was seen at bedside by neurology. Patient reports continuation of her headache.    REVIEW OF SYSTEMS: Otherwise denies fever, chills, headaches, vision changes, blurry vision, double vision, nausea, vomiting, hearing change, focal weakness, focal numbness, parasthesias, bowel/ bladder incontinence.  Few questions of a 10-system ROS was performed and is negative except for those items noted above and/or in the HPI.    VITALS & EXAMINATION:  Vital Signs Last 24 Hrs  T(C): 36.7 (18 Dec 2023 07:51), Max: 36.8 (18 Dec 2023 00:21)  T(F): 98 (18 Dec 2023 07:51), Max: 98.3 (18 Dec 2023 00:21)  HR: 66 (18 Dec 2023 07:51) (65 - 78)  BP: 109/60 (18 Dec 2023 07:51) (109/60 - 122/68)  BP(mean): --  RR: 18 (18 Dec 2023 07:51) (18 - 18)  SpO2: 100% (18 Dec 2023 07:51) (98% - 100%)    Parameters below as of 18 Dec 2023 07:51  Patient On (Oxygen Delivery Method): room air        General:  Constitutional: Female, appears stated age, nontoxic, not in distress,    Head: Normocephalic;   Eyes: clear sclera;   Extremities: No cyanosis;   Resp: breathing comfortably  Neck: no nuchal rigidity  Fundoscopic exam:      Neurological (>12):  MS: Awake, alert.  Oriented person place situation. Follows commands. Attends to examiner  Language: Speech is hypophonic, clear, fluent, good repetition,  comprehension, registration of words.  CNs: PERRL (R 3mm, L 3mm). VFF. EOMI. No disconjugate gaze, nystagmus. V1-3 intact LT, No facial asymmetry b/l. Hearing grossly normal b/l. Tongue midline and can move side to side.     Motor - Normal bulk and tone throughout. No pronator drift.    L/R (out of 5 each)       Deltoid  5/5    Biceps   5/5      Triceps  5/5         Wrist Extension 5/5   Wrist Flexion  5/5   Interossei 5/5     5/5   L/R (out of 5 each)       Hip Flexion  5/5    Hip Extension  5/5  Knee Extension  5/5  Dorsiflexion  5/5      Plantar Flexion 5/5     Sensation: Intact to LT b/l. Cortical: Extinction on DSS (neglect): none  Reflexes L/R:  Biceps(C5) 2/2  BR(C6) 2/2   Triceps(C7)  2/2 Patellar(L4)   2/2   Ankles 2/2   Toes: mute b/l  Coordination: No dysmetria to FTN b/l UE  Gait: Normal Romberg. No postural instability. Normal stance. Gait baseline.    LABORATORY:  CBC                       12.5   11.47 )-----------( 465      ( 18 Dec 2023 07:30 )             38.1     Chem 12-18    136  |  103  |  22  ----------------------------<  180<H>  3.7   |  18<L>  |  0.83    Ca    9.1      18 Dec 2023 07:30  Phos  3.9     12-18  Mg     1.90     12-18      LFTs   Coagulopathy   Lipid Panel   A1c   Cardiac enzymes     U/A Urinalysis Basic - ( 18 Dec 2023 07:30 )    Color: x / Appearance: x / SG: x / pH: x  Gluc: 180 mg/dL / Ketone: x  / Bili: x / Urobili: x   Blood: x / Protein: x / Nitrite: x   Leuk Esterase: x / RBC: x / WBC x   Sq Epi: x / Non Sq Epi: x / Bacteria: x      CSF  Immunological  Other    STUDIES & IMAGING: (EEG, CT, MR, U/S, TTE/TORY): Neurology Progress Note    SUBJECTIVE/OBJECTIVE/INTERVAL EVENTS: Patient seen and examined at bedside w/ neuro attending and team.     52 yo F who reports she has hx of pseudomotor cerebri, dx 4 years ago. At the time a therapeutic LP was done and patient had relief of headache. She was prescribed diamox and doing well on it until 3 or so weeks ago. Patient reports 3 weeks ago, she began having symptoms again, and more intense than prior. She reports ringing in her ears, described as a whooshing sound like "watermelons in a washing machine" and that she hears her heartbeat in her ears. She reports every move she makes is extremely painful. If she is lying perfectly still, she may have some relief of her symptoms for a bit, but any movement of her head results in pain. Patient also reports new sensitivity to light and reports she has been having new onset symptoms of lights flashing in her eyes, lasting just a few seconds before disappearing. She reports pain with eye movement to the extremes on horizontal or vertical gaze and pain with eye movement back to center. She has been unsteadier on her feet as well due to the pain in her head. She is on baclofen, carvedilol, bumex, spironolactone lisinopril, insulin, admelog, metformin. (In chart, active orders include oxycodone, hydromorphone, ofirmev, baclofen 5q8, naloxegol). She has hx intercoastal neuralgia, CRPS type 1, and pseudotumor cerebri. Patient came in to the [facility] ED and was transferred to [facility] ED. LP was attempted in ED but was reportedly a dry tap. Patient then had LP under fluoro, with opening pressure reported to be 11. Patient reports her headache has been worse since the lumbar punctures. Patient states she is unable to get an MRI due to her spinal stimulator although she got MRI in the past even with the stim in place.     INTERVAL HISTORY: Patient was seen at bedside by neurology. Patient reports continuation of her headache.    REVIEW OF SYSTEMS: Otherwise denies fever, chills, headaches,  bowel/ bladder incontinence.       VITALS & EXAMINATION:  Vital Signs Last 24 Hrs  T(C): 36.7 (18 Dec 2023 07:51), Max: 36.8 (18 Dec 2023 00:21)  T(F): 98 (18 Dec 2023 07:51), Max: 98.3 (18 Dec 2023 00:21)  HR: 66 (18 Dec 2023 07:51) (65 - 78)  BP: 109/60 (18 Dec 2023 07:51) (109/60 - 122/68)  BP(mean): --  RR: 18 (18 Dec 2023 07:51) (18 - 18)  SpO2: 100% (18 Dec 2023 07:51) (98% - 100%)    Parameters below as of 18 Dec 2023 07:51  Patient On (Oxygen Delivery Method): room air      General:  Constitutional: Female, obese habitus, sitting in bed  Head: Normocephalic;   Eyes: clear sclera;   Resp: breathing comfortably  Neck: no nuchal rigidity    Neurological (>12):  MS: Awake, alert.  Oriented person place situation. Follows commands. Attends to examiner  Language: Speech is clear, fluent, good repetition, comprehension, registration of words.  CNs: PERRL. VFF. Acuity 202/20 b/l.  EOMI but reports pain. No  nystagmus. V1-3 intact LT, No overt facial asymmetry b/l. Hearing grossly normal b/l. Tongue midline and can move side to side.     Motor - Normal tone throughout. No pronator drift.    L/R (out of 5 each)       Deltoid  5/5    Biceps   5/5      Triceps  5/5         Wrist Extension 5/5   Wrist Flexion  5/5   Interossei 5/5     5/5   L/R (out of 5 each)       Hip Flexion  5/5    Hip Extension  5/5  Knee Extension  5/5  Dorsiflexion  5/5      Plantar Flexion 5/5     Sensation: Intact to LT b/l.    Reflexes L/R:  Biceps(C5) 2/2  BR(C6) 2/2   Triceps(C7)  2/2 Patellar(L4)   2/2     Toes: mute b/l  Coordination: No dysmetria to FTN b/l UE  Gait: able to stand    LABORATORY:  CBC                       12.5   11.47 )-----------( 465      ( 18 Dec 2023 07:30 )             38.1     Chem 12-18    136  |  103  |  22  ----------------------------<  180<H>  3.7   |  18<L>  |  0.83    Ca    9.1      18 Dec 2023 07:30  Phos  3.9     12-18  Mg     1.90     12-18      LFTs   Coagulopathy   Lipid Panel   A1c   Cardiac enzymes     U/A Urinalysis Basic - ( 18 Dec 2023 07:30 )    Color: x / Appearance: x / SG: x / pH: x  Gluc: 180 mg/dL / Ketone: x  / Bili: x / Urobili: x   Blood: x / Protein: x / Nitrite: x   Leuk Esterase: x / RBC: x / WBC x   Sq Epi: x / Non Sq Epi: x / Bacteria: x      CSF  Immunological  Other    STUDIES & IMAGING: (EEG, CT, MR, U/S, TTE/TORY):    CT BRAIN WAW  12/16/2023      FINDINGS:  No acute intracranial hemorrhage, mass effect, hydrocephalus, or midline shift. No extra-axial collection.  Expanded sella turcica with CSF attenuation due to near-complete empty sella versus underlying arachnoid cyst.  Postcontrast images show normal opacification of the dural venous sinuses without filling defect or thrombus formation.  Ventricles are normal in size. Basal cisterns are patent. Empty sella is noted.  Right maxillary polyps versus retention cysts. Mild mucosal thickening of the bilateral ethmoid air cells. The mastoid air cells are clear. The calvarium is intact. Left parafalcine calcified structure measures 1.7 x 0.6 cm, likely a calcified meningioma.    IMPRESSION:  No acute intracranial bleeding.  Expanded sella turcica with CSF attenuation due to near-complete empty sella versus underlying arachnoid cyst. Findings may be seen in the setting of intracranial hypertension. Neurology Progress Note    SUBJECTIVE/OBJECTIVE/INTERVAL EVENTS: Patient seen and examined at bedside w/ neuro attending and team.     52 yo F who reports she has hx of pseudomotor cerebri, dx 4 years ago. At the time a therapeutic LP was done and patient had relief of headache. She was prescribed diamox and doing well on it until 3 or so weeks ago. Patient reports 3 weeks ago, she began having symptoms again, and more intense than prior. She reports ringing in her ears, described as a whooshing sound like "watermelons in a washing machine" and that she hears her heartbeat in her ears. She reports every move she makes is extremely painful. If she is lying perfectly still, she may have some relief of her symptoms for a bit, but any movement of her head results in pain. Patient also reports new sensitivity to light and reports she has been having new onset symptoms of lights flashing in her eyes, lasting just a few seconds before disappearing. She reports pain with eye movement to the extremes on horizontal or vertical gaze and pain with eye movement back to center. She has been unsteadier on her feet as well due to the pain in her head. She is on baclofen, carvedilol, bumex, spironolactone lisinopril, insulin, admelog, metformin. (In chart, active orders include oxycodone, hydromorphone, ofirmev, baclofen 5q8, naloxegol). She has hx intercoastal neuralgia, CRPS type 1, and pseudotumor cerebri. Patient came in to the [facility] ED and was transferred to [facility] ED. LP was attempted in ED but was reportedly a dry tap. Patient then had LP under fluoro, with opening pressure reported to be 11. Patient reports her headache has been worse since the lumbar punctures. Patient states she is unable to get an MRI due to her spinal stimulator although she got MRI in the past even with the stim in place.     INTERVAL HISTORY: Patient was seen at bedside by neurology. Patient reports continuation of her headache.    REVIEW OF SYSTEMS: Otherwise denies fever, chills, headaches,  bowel/ bladder incontinence.       VITALS & EXAMINATION:  Vital Signs Last 24 Hrs  T(C): 36.7 (18 Dec 2023 07:51), Max: 36.8 (18 Dec 2023 00:21)  T(F): 98 (18 Dec 2023 07:51), Max: 98.3 (18 Dec 2023 00:21)  HR: 66 (18 Dec 2023 07:51) (65 - 78)  BP: 109/60 (18 Dec 2023 07:51) (109/60 - 122/68)  BP(mean): --  RR: 18 (18 Dec 2023 07:51) (18 - 18)  SpO2: 100% (18 Dec 2023 07:51) (98% - 100%)    Parameters below as of 18 Dec 2023 07:51  Patient On (Oxygen Delivery Method): room air      General:  Constitutional: Female, obese habitus, sitting in bed  Head: Normocephalic;   Eyes: clear sclera;   Resp: breathing comfortably  Neck: no nuchal rigidity    Neurological (>12):  MS: Awake, alert.  Oriented person place situation. Follows commands. Attends to examiner  Language: Speech is clear, fluent, good repetition, comprehension, registration of words.  CNs: PERRL. VFF. Acuity 202/20 b/l.  EOMI but reports pain. No  nystagmus. V1-3 intact LT, No overt facial asymmetry b/l. Hearing grossly normal b/l. Tongue midline and can move side to side.     Motor - Normal tone throughout. No pronator drift.    L/R (out of 5 each)       Deltoid  5/5    Biceps   5/5      Triceps  5/5         Wrist Extension 5/5   Wrist Flexion  5/5   Interossei 5/5     5/5   L/R (out of 5 each)       Hip Flexion  5/5    Hip Extension  5/5  Knee Extension  5/5  Dorsiflexion  5/5      Plantar Flexion 5/5     Sensation: Intact to LT b/l.    Reflexes L/R:  Biceps(C5) 2/2  BR(C6) 2/2   Triceps(C7)  2/2 Patellar(L4)   2/2     Toes: mute b/l  Coordination: No dysmetria to FTN b/l UE  Gait: able to stand    LABORATORY:  CBC                       12.5   11.47 )-----------( 465      ( 18 Dec 2023 07:30 )             38.1     Chem 12-18    136  |  103  |  22  ----------------------------<  180<H>  3.7   |  18<L>  |  0.83    Ca    9.1      18 Dec 2023 07:30  Phos  3.9     12-18  Mg     1.90     12-18      LFTs   Coagulopathy   Lipid Panel   A1c   Cardiac enzymes     U/A Urinalysis Basic - ( 18 Dec 2023 07:30 )    Color: x / Appearance: x / SG: x / pH: x  Gluc: 180 mg/dL / Ketone: x  / Bili: x / Urobili: x   Blood: x / Protein: x / Nitrite: x   Leuk Esterase: x / RBC: x / WBC x   Sq Epi: x / Non Sq Epi: x / Bacteria: x      CSF  Immunological  Other    STUDIES & IMAGING: (EEG, CT, MR, U/S, TTE/TORY):    CT BRAIN WAW  12/16/2023      FINDINGS:  No acute intracranial hemorrhage, mass effect, hydrocephalus, or midline shift. No extra-axial collection.  Expanded sella turcica with CSF attenuation due to near-complete empty sella versus underlying arachnoid cyst.  Postcontrast images show normal opacification of the dural venous sinuses without filling defect or thrombus formation.  Ventricles are normal in size. Basal cisterns are patent. Empty sella is noted.  Right maxillary polyps versus retention cysts. Mild mucosal thickening of the bilateral ethmoid air cells. The mastoid air cells are clear. The calvarium is intact. Left parafalcine calcified structure measures 1.7 x 0.6 cm, likely a calcified meningioma.    IMPRESSION:  No acute intracranial bleeding.  Expanded sella turcica with CSF attenuation due to near-complete empty sella versus underlying arachnoid cyst. Findings may be seen in the setting of intracranial hypertension. Neurology Progress Note    SUBJECTIVE/OBJECTIVE/INTERVAL EVENTS: Patient seen and examined at bedside w/ neuro attending and team.     52 yo F who reports she has hx of pseudomotor cerebri, dx 4 years ago. At the time a therapeutic LP was done and patient had relief of headache. She was prescribed diamox and doing well on it until 3 or so weeks ago. Patient reports 3 weeks ago, she began having symptoms again, and more intense than prior. She reports ringing in her ears, described as a whooshing sound like "watermelons in a washing machine" and that she hears her heartbeat in her ears. She reports every move she makes is extremely painful. If she is lying perfectly still, she may have some relief of her symptoms for a bit, but any movement of her head results in pain. Patient also reports new sensitivity to light and reports she has been having new onset symptoms of lights flashing in her eyes, lasting just a few seconds before disappearing. She reports pain with eye movement to the extremes on horizontal or vertical gaze and pain with eye movement back to center. She has been unsteadier on her feet as well due to the pain in her head. She is on baclofen, carvedilol, bumex, spironolactone lisinopril, insulin, admelog, metformin. (In chart, active orders include oxycodone, hydromorphone, ofirmev, baclofen 5q8, naloxegol). She has hx intercoastal neuralgia, CRPS type 1, and pseudotumor cerebri. Patient came in to the [facility] ED and was transferred to [facility] ED. LP was attempted in ED but was reportedly a dry tap. Patient then had LP under fluoro, with opening pressure reported to be 11. Patient reports her headache has been worse since the lumbar punctures. Patient states she is unable to get an MRI due to her spinal stimulator although she got MRI in the past even with the stim in place.     INTERVAL HISTORY: Patient was seen at bedside by neurology. Patient reports continuation of her headache.    REVIEW OF SYSTEMS: Otherwise denies fever, chills, headaches,  bowel/ bladder incontinence.       VITALS & EXAMINATION:  Vital Signs Last 24 Hrs  T(C): 36.7 (18 Dec 2023 07:51), Max: 36.8 (18 Dec 2023 00:21)  T(F): 98 (18 Dec 2023 07:51), Max: 98.3 (18 Dec 2023 00:21)  HR: 66 (18 Dec 2023 07:51) (65 - 78)  BP: 109/60 (18 Dec 2023 07:51) (109/60 - 122/68)  BP(mean): --  RR: 18 (18 Dec 2023 07:51) (18 - 18)  SpO2: 100% (18 Dec 2023 07:51) (98% - 100%)    Parameters below as of 18 Dec 2023 07:51  Patient On (Oxygen Delivery Method): room air      General:  Constitutional: Female, obese habitus, sitting in bed  Head: Normocephalic;   Eyes: clear sclera;   Resp: breathing comfortably  Neck: no nuchal rigidity    Neurological (>12):  MS: Awake, alert.  Oriented person place situation. Follows commands. Attends to examiner  Language: Speech is clear, fluent, good repetition, comprehension, registration of words.  CNs: PERRL. VFF. Acuity 20/20 b/l.  EOMI but reports pain. No  nystagmus. V1-3 intact LT, No overt facial asymmetry b/l. Hearing grossly normal b/l. Tongue midline and can move side to side.   FUndus - unable to visualize fundus well -     Motor - Normal tone throughout. No pronator drift.    L/R (out of 5 each)       Deltoid  5/5    Biceps   5/5      Triceps  5/5         Wrist Extension 5/5   Wrist Flexion  5/5   Interossei 5/5     5/5   L/R (out of 5 each)       Hip Flexion  5/5    Hip Extension  5/5  Knee Extension  5/5  Dorsiflexion  5/5      Plantar Flexion 5/5     Sensation: Intact to LT b/l.    Reflexes L/R:  Biceps(C5) 2/2  BR(C6) 2/2   Triceps(C7)  2/2 Patellar(L4)   2/2   Ankle 2/2  Toes: mute b/l  Coordination: No dysmetria to FTN, HKS b/l UE  Gait: able to stand without assistance and normal balance.     LABORATORY:  CBC                       12.5   11.47 )-----------( 465      ( 18 Dec 2023 07:30 )             38.1     Chem 12-18    136  |  103  |  22  ----------------------------<  180<H>  3.7   |  18<L>  |  0.83    Ca    9.1      18 Dec 2023 07:30  Phos  3.9     12-18  Mg     1.90     12-18      LFTs   Coagulopathy   Lipid Panel   A1c   Cardiac enzymes     U/A Urinalysis Basic - ( 18 Dec 2023 07:30 )    Color: x / Appearance: x / SG: x / pH: x  Gluc: 180 mg/dL / Ketone: x  / Bili: x / Urobili: x   Blood: x / Protein: x / Nitrite: x   Leuk Esterase: x / RBC: x / WBC x   Sq Epi: x / Non Sq Epi: x / Bacteria: x      CSF  Immunological  Other    STUDIES & IMAGING: (EEG, CT, MR, U/S, TTE/TORY):    CT BRAIN WAW  12/16/2023      FINDINGS:  No acute intracranial hemorrhage, mass effect, hydrocephalus, or midline shift. No extra-axial collection.  Expanded sella turcica with CSF attenuation due to near-complete empty sella versus underlying arachnoid cyst.  Postcontrast images show normal opacification of the dural venous sinuses without filling defect or thrombus formation.  Ventricles are normal in size. Basal cisterns are patent. Empty sella is noted.  Right maxillary polyps versus retention cysts. Mild mucosal thickening of the bilateral ethmoid air cells. The mastoid air cells are clear. The calvarium is intact. Left parafalcine calcified structure measures 1.7 x 0.6 cm, likely a calcified meningioma.    IMPRESSION:  No acute intracranial bleeding.  Expanded sella turcica with CSF attenuation due to near-complete empty sella versus underlying arachnoid cyst. Findings may be seen in the setting of intracranial hypertension. Neurology Progress Note    SUBJECTIVE/OBJECTIVE/INTERVAL EVENTS: Patient seen and examined at bedside w/ neuro attending and team.     54 yo F who reports she has hx of pseudomotor cerebri, dx 4 years ago. At the time a therapeutic LP was done and patient had relief of headache. She was prescribed diamox and doing well on it until 3 or so weeks ago. Patient reports 3 weeks ago, she began having symptoms again, and more intense than prior. She reports ringing in her ears, described as a whooshing sound like "watermelons in a washing machine" and that she hears her heartbeat in her ears. She reports every move she makes is extremely painful. If she is lying perfectly still, she may have some relief of her symptoms for a bit, but any movement of her head results in pain. Patient also reports new sensitivity to light and reports she has been having new onset symptoms of lights flashing in her eyes, lasting just a few seconds before disappearing. She reports pain with eye movement to the extremes on horizontal or vertical gaze and pain with eye movement back to center. She has been unsteadier on her feet as well due to the pain in her head. She is on baclofen, carvedilol, bumex, spironolactone lisinopril, insulin, admelog, metformin. (In chart, active orders include oxycodone, hydromorphone, ofirmev, baclofen 5q8, naloxegol). She has hx intercoastal neuralgia, CRPS type 1, and pseudotumor cerebri. Patient came in to the [facility] ED and was transferred to [facility] ED. LP was attempted in ED but was reportedly a dry tap. Patient then had LP under fluoro, with opening pressure reported to be 11. Patient reports her headache has been worse since the lumbar punctures. Patient states she is unable to get an MRI due to her spinal stimulator although she got MRI in the past even with the stim in place.     INTERVAL HISTORY: Patient was seen at bedside by neurology. Patient reports continuation of her headache.    REVIEW OF SYSTEMS: Otherwise denies fever, chills, headaches,  bowel/ bladder incontinence.       VITALS & EXAMINATION:  Vital Signs Last 24 Hrs  T(C): 36.7 (18 Dec 2023 07:51), Max: 36.8 (18 Dec 2023 00:21)  T(F): 98 (18 Dec 2023 07:51), Max: 98.3 (18 Dec 2023 00:21)  HR: 66 (18 Dec 2023 07:51) (65 - 78)  BP: 109/60 (18 Dec 2023 07:51) (109/60 - 122/68)  BP(mean): --  RR: 18 (18 Dec 2023 07:51) (18 - 18)  SpO2: 100% (18 Dec 2023 07:51) (98% - 100%)    Parameters below as of 18 Dec 2023 07:51  Patient On (Oxygen Delivery Method): room air      General:  Constitutional: Female, obese habitus, sitting in bed  Head: Normocephalic;   Eyes: clear sclera;   Resp: breathing comfortably  Neck: no nuchal rigidity    Neurological (>12):  MS: Awake, alert.  Oriented person place situation. Follows commands. Attends to examiner  Language: Speech is clear, fluent, good repetition, comprehension, registration of words.  CNs: PERRL. VFF. Acuity 20/20 b/l.  EOMI but reports pain. No  nystagmus. V1-3 intact LT, No overt facial asymmetry b/l. Hearing grossly normal b/l. Tongue midline and can move side to side.   FUndus - unable to visualize fundus well -     Motor - Normal tone throughout. No pronator drift.    L/R (out of 5 each)       Deltoid  5/5    Biceps   5/5      Triceps  5/5         Wrist Extension 5/5   Wrist Flexion  5/5   Interossei 5/5     5/5   L/R (out of 5 each)       Hip Flexion  5/5    Hip Extension  5/5  Knee Extension  5/5  Dorsiflexion  5/5      Plantar Flexion 5/5     Sensation: Intact to LT b/l.    Reflexes L/R:  Biceps(C5) 2/2  BR(C6) 2/2   Triceps(C7)  2/2 Patellar(L4)   2/2   Ankle 2/2  Toes: mute b/l  Coordination: No dysmetria to FTN, HKS b/l UE  Gait: able to stand without assistance and normal balance.     LABORATORY:  CBC                       12.5   11.47 )-----------( 465      ( 18 Dec 2023 07:30 )             38.1     Chem 12-18    136  |  103  |  22  ----------------------------<  180<H>  3.7   |  18<L>  |  0.83    Ca    9.1      18 Dec 2023 07:30  Phos  3.9     12-18  Mg     1.90     12-18      LFTs   Coagulopathy   Lipid Panel   A1c   Cardiac enzymes     U/A Urinalysis Basic - ( 18 Dec 2023 07:30 )    Color: x / Appearance: x / SG: x / pH: x  Gluc: 180 mg/dL / Ketone: x  / Bili: x / Urobili: x   Blood: x / Protein: x / Nitrite: x   Leuk Esterase: x / RBC: x / WBC x   Sq Epi: x / Non Sq Epi: x / Bacteria: x      CSF  Immunological  Other    STUDIES & IMAGING: (EEG, CT, MR, U/S, TTE/TORY):    CT BRAIN WAW  12/16/2023      FINDINGS:  No acute intracranial hemorrhage, mass effect, hydrocephalus, or midline shift. No extra-axial collection.  Expanded sella turcica with CSF attenuation due to near-complete empty sella versus underlying arachnoid cyst.  Postcontrast images show normal opacification of the dural venous sinuses without filling defect or thrombus formation.  Ventricles are normal in size. Basal cisterns are patent. Empty sella is noted.  Right maxillary polyps versus retention cysts. Mild mucosal thickening of the bilateral ethmoid air cells. The mastoid air cells are clear. The calvarium is intact. Left parafalcine calcified structure measures 1.7 x 0.6 cm, likely a calcified meningioma.    IMPRESSION:  No acute intracranial bleeding.  Expanded sella turcica with CSF attenuation due to near-complete empty sella versus underlying arachnoid cyst. Findings may be seen in the setting of intracranial hypertension.

## 2023-12-18 NOTE — PROGRESS NOTE ADULT - ASSESSMENT
54 yo F with Pmhx of CHF, HTN, HLD, DM, Idiopathic intracranial hypertension (diagnosed 2020), chronic back pain (intercostal neuralgia/complex regional pain syndrome) s/p spinal stimulator presents to the ED with worsening headache thought to be  due to Pseudotumor cerebri. s/p  LP by IR found to have normal opening pressure.  now with worse headache after LP

## 2023-12-18 NOTE — PROGRESS NOTE ADULT - ATTENDING COMMENTS
Pain is much better when lying still.  Pain is still severe when she is lying down if she moves her head.     A/P  Ms. Linares is a 52 yo woman with headache of uncertain etiology.    She does not have idiopathic intracranial hypertension at the present time with a normal opening pressure (11 cm H2O) with lumbar puncture under fluoroscopic guidance.   This could be a low pressure headache but even when she is lying down if she moves her head the pain is severe.  Anesthesia has already evaluated her for possible EBP.   I agree with work up and management as above.   D/W patient.  Thank you. Pain is much better when lying still.  Pain is still severe when she is lying down if she moves her head.     A/P  Ms. Linares is a 54 yo woman with headache of uncertain etiology.    She does not have idiopathic intracranial hypertension at the present time with a normal opening pressure (11 cm H2O) with lumbar puncture under fluoroscopic guidance.   This could be a low pressure headache but even when she is lying down if she moves her head the pain is severe.  Anesthesia has already evaluated her for possible EBP.   I agree with work up and management as above.   D/W patient.  Thank you.

## 2023-12-18 NOTE — PROGRESS NOTE ADULT - PROBLEM SELECTOR PLAN 1
Patient reports to have band-like headache, tinnitus, blurry vision, and photophobia, consistent with her previous episode of idiopathic intracranial HTN /pseudotumor cerebri   -CT head and venogram is negative for any acute pathology   -Unable to get MRI/MRV brain, MRI orbits w/wo contrast as pt with neurostimulator which is not MRI compatible.  -ED attempted to perform LP 10X but was not successful, Had LP on 12/11 with IR, opening pressure measured 11 cm water.   -Post LP pt's worse headache- neuro rec blood patch.   -anesthesia rec CTH and IR consult for blood path if imaging shows signs of intracranial hypotension   -Head ct with contrast 12/16: Expanded sella turcica with CSF attenuation due to near-complete empty sella versus underlying arachnoid cyst. Findings may be seen in the setting of intracranial hypertension.  -reached out to neurology re: additional w/u or treatment for pt's headache given above imaging- likely not a candidate for blood patch based on anesthesia recs   -c/w diamox and pain meds for now Patient reports to have band-like headache, tinnitus, blurry vision, and photophobia, consistent with her previous episode of idiopathic intracranial HTN /pseudotumor cerebri   -CT head and venogram is negative for any acute pathology   -Unable to get MRI/MRV brain, MRI orbits w/wo contrast as pt with neurostimulator which is not MRI compatible.  -ED attempted to perform LP 10X but was not successful, Had LP on 12/11 with IR, opening pressure measured 11 cm water.   -Post LP pt has worse headache- neuro rec blood patch.   -anesthesia rec CTH and IR consult for blood path if imaging shows signs of intracranial hypotension   -Head ct with contrast 12/16: Expanded sella turcica with CSF attenuation due to near-complete empty sella versus underlying arachnoid cyst. Findings may be seen in the setting of intracranial hypertension.  -reached out to neurology re: additional w/u or treatment for pt's headache given above imaging- likely not a candidate for blood patch based on anesthesia recs   -c/w diamox and pain meds for now

## 2023-12-19 LAB
GLUCOSE BLDC GLUCOMTR-MCNC: 135 MG/DL — HIGH (ref 70–99)
GLUCOSE BLDC GLUCOMTR-MCNC: 135 MG/DL — HIGH (ref 70–99)
GLUCOSE BLDC GLUCOMTR-MCNC: 186 MG/DL — HIGH (ref 70–99)
GLUCOSE BLDC GLUCOMTR-MCNC: 186 MG/DL — HIGH (ref 70–99)
GLUCOSE BLDC GLUCOMTR-MCNC: 207 MG/DL — HIGH (ref 70–99)
GLUCOSE BLDC GLUCOMTR-MCNC: 207 MG/DL — HIGH (ref 70–99)
GLUCOSE BLDC GLUCOMTR-MCNC: 210 MG/DL — HIGH (ref 70–99)
GLUCOSE BLDC GLUCOMTR-MCNC: 210 MG/DL — HIGH (ref 70–99)

## 2023-12-19 PROCEDURE — 99232 SBSQ HOSP IP/OBS MODERATE 35: CPT

## 2023-12-19 RX ORDER — LANOLIN ALCOHOL/MO/W.PET/CERES
6 CREAM (GRAM) TOPICAL AT BEDTIME
Refills: 0 | Status: DISCONTINUED | OUTPATIENT
Start: 2023-12-19 | End: 2023-12-29

## 2023-12-19 RX ORDER — HYDROMORPHONE HYDROCHLORIDE 2 MG/ML
0.5 INJECTION INTRAMUSCULAR; INTRAVENOUS; SUBCUTANEOUS ONCE
Refills: 0 | Status: DISCONTINUED | OUTPATIENT
Start: 2023-12-19 | End: 2023-12-19

## 2023-12-19 RX ORDER — LANOLIN ALCOHOL/MO/W.PET/CERES
3 CREAM (GRAM) TOPICAL ONCE
Refills: 0 | Status: COMPLETED | OUTPATIENT
Start: 2023-12-19 | End: 2023-12-19

## 2023-12-19 RX ORDER — INSULIN GLARGINE 100 [IU]/ML
45 INJECTION, SOLUTION SUBCUTANEOUS AT BEDTIME
Refills: 0 | Status: DISCONTINUED | OUTPATIENT
Start: 2023-12-19 | End: 2023-12-22

## 2023-12-19 RX ADMIN — HYDROMORPHONE HYDROCHLORIDE 0.5 MILLIGRAM(S): 2 INJECTION INTRAMUSCULAR; INTRAVENOUS; SUBCUTANEOUS at 21:09

## 2023-12-19 RX ADMIN — ACETAZOLAMIDE 500 MILLIGRAM(S): 250 TABLET ORAL at 18:18

## 2023-12-19 RX ADMIN — OXYCODONE HYDROCHLORIDE 10 MILLIGRAM(S): 5 TABLET ORAL at 18:30

## 2023-12-19 RX ADMIN — OXYCODONE HYDROCHLORIDE 10 MILLIGRAM(S): 5 TABLET ORAL at 19:17

## 2023-12-19 RX ADMIN — CARVEDILOL PHOSPHATE 3.12 MILLIGRAM(S): 80 CAPSULE, EXTENDED RELEASE ORAL at 18:18

## 2023-12-19 RX ADMIN — Medication 6 MILLIGRAM(S): at 22:43

## 2023-12-19 RX ADMIN — INSULIN GLARGINE 45 UNIT(S): 100 INJECTION, SOLUTION SUBCUTANEOUS at 22:43

## 2023-12-19 RX ADMIN — SPIRONOLACTONE 25 MILLIGRAM(S): 25 TABLET, FILM COATED ORAL at 06:23

## 2023-12-19 RX ADMIN — HYDROMORPHONE HYDROCHLORIDE 0.5 MILLIGRAM(S): 2 INJECTION INTRAMUSCULAR; INTRAVENOUS; SUBCUTANEOUS at 20:39

## 2023-12-19 RX ADMIN — Medication 15 UNIT(S): at 13:27

## 2023-12-19 RX ADMIN — HYDROMORPHONE HYDROCHLORIDE 0.5 MILLIGRAM(S): 2 INJECTION INTRAMUSCULAR; INTRAVENOUS; SUBCUTANEOUS at 17:54

## 2023-12-19 RX ADMIN — HYDROMORPHONE HYDROCHLORIDE 0.5 MILLIGRAM(S): 2 INJECTION INTRAMUSCULAR; INTRAVENOUS; SUBCUTANEOUS at 10:00

## 2023-12-19 RX ADMIN — ONDANSETRON 4 MILLIGRAM(S): 8 TABLET, FILM COATED ORAL at 21:03

## 2023-12-19 RX ADMIN — BUMETANIDE 2 MILLIGRAM(S): 0.25 INJECTION INTRAMUSCULAR; INTRAVENOUS at 06:23

## 2023-12-19 RX ADMIN — Medication 2: at 13:26

## 2023-12-19 RX ADMIN — CARVEDILOL PHOSPHATE 3.12 MILLIGRAM(S): 80 CAPSULE, EXTENDED RELEASE ORAL at 06:23

## 2023-12-19 RX ADMIN — ACETAZOLAMIDE 500 MILLIGRAM(S): 250 TABLET ORAL at 06:23

## 2023-12-19 RX ADMIN — Medication 1: at 18:19

## 2023-12-19 RX ADMIN — Medication 3 MILLIGRAM(S): at 00:46

## 2023-12-19 RX ADMIN — Medication 0: at 09:18

## 2023-12-19 RX ADMIN — OXYCODONE HYDROCHLORIDE 10 MILLIGRAM(S): 5 TABLET ORAL at 12:28

## 2023-12-19 RX ADMIN — OXYCODONE HYDROCHLORIDE 10 MILLIGRAM(S): 5 TABLET ORAL at 13:28

## 2023-12-19 RX ADMIN — Medication 15 UNIT(S): at 18:20

## 2023-12-19 RX ADMIN — HYDROMORPHONE HYDROCHLORIDE 0.5 MILLIGRAM(S): 2 INJECTION INTRAMUSCULAR; INTRAVENOUS; SUBCUTANEOUS at 09:21

## 2023-12-19 RX ADMIN — LISINOPRIL 10 MILLIGRAM(S): 2.5 TABLET ORAL at 06:22

## 2023-12-19 RX ADMIN — Medication 15 UNIT(S): at 09:18

## 2023-12-19 RX ADMIN — ONDANSETRON 4 MILLIGRAM(S): 8 TABLET, FILM COATED ORAL at 10:16

## 2023-12-19 RX ADMIN — NALOXEGOL OXALATE 25 MILLIGRAM(S): 12.5 TABLET, FILM COATED ORAL at 12:28

## 2023-12-19 RX ADMIN — OXYCODONE HYDROCHLORIDE 10 MILLIGRAM(S): 5 TABLET ORAL at 00:12

## 2023-12-19 RX ADMIN — HYDROMORPHONE HYDROCHLORIDE 0.5 MILLIGRAM(S): 2 INJECTION INTRAMUSCULAR; INTRAVENOUS; SUBCUTANEOUS at 17:25

## 2023-12-19 NOTE — PROGRESS NOTE ADULT - SUBJECTIVE AND OBJECTIVE BOX
[facility] Division of Hospital Medicine  Anderson Yates MD  Pager [phone]      Patient is a 53y old  Female who presents with a chief complaint of Headache, tinnitus (18 Dec 2023 16:50)      SUBJECTIVE / OVERNIGHT EVENTS:    pt cont to report headache, slightly improved since yesterday, but still debilitating. Tearful for the prolonged headache episode    ADDITIONAL REVIEW OF SYSTEMS:    RESPIRATORY: No cough, wheezing, chills or hemoptysis; No shortness of breath  CARDIOVASCULAR: No chest pain, palpitations, dizziness, or leg swelling  GASTROINTESTINAL: No abdominal or epigastric pain. No nausea, vomiting, or hematemesis; No diarrhea or constipation. No melena or hematochezia.    MEDICATIONS  (STANDING):  acetaZOLAMIDE    Tablet 500 milliGRAM(s) Oral two times a day  buMETAnide 2 milliGRAM(s) Oral daily  carvedilol 3.125 milliGRAM(s) Oral every 12 hours  dextrose 5%. 1000 milliLiter(s) (50 mL/Hr) IV Continuous <Continuous>  dextrose 5%. 1000 milliLiter(s) (100 mL/Hr) IV Continuous <Continuous>  dextrose 50% Injectable 25 Gram(s) IV Push once  dextrose 50% Injectable 25 Gram(s) IV Push once  dextrose 50% Injectable 12.5 Gram(s) IV Push once  glucagon  Injectable 1 milliGRAM(s) IntraMuscular once  insulin lispro (ADMELOG) corrective regimen sliding scale   SubCutaneous at bedtime  insulin lispro (ADMELOG) corrective regimen sliding scale   SubCutaneous three times a day before meals  insulin lispro Injectable (ADMELOG) 15 Unit(s) SubCutaneous three times a day before meals  lisinopril 10 milliGRAM(s) Oral daily  naloxegol 25 milliGRAM(s) Oral daily  spironolactone 25 milliGRAM(s) Oral daily    MEDICATIONS  (PRN):  acetaminophen     Tablet .. 650 milliGRAM(s) Oral every 6 hours PRN Temp greater or equal to 38C (100.4F), Mild Pain (1 - 3)  acetaminophen   IVPB .. 1000 milliGRAM(s) IV Intermittent once PRN Mild Pain (1 - 3)  aluminum hydroxide/magnesium hydroxide/simethicone Suspension 30 milliLiter(s) Oral every 4 hours PRN Dyspepsia  baclofen 5 milliGRAM(s) Oral every 8 hours PRN Muscle Spasm  dextrose Oral Gel 15 Gram(s) Oral once PRN Blood Glucose LESS THAN 70 milliGRAM(s)/deciliter  HYDROmorphone  Injectable 0.5 milliGRAM(s) IV Push every 8 hours PRN Severe Pain (7 - 10)  melatonin 3 milliGRAM(s) Oral at bedtime PRN Insomnia  ondansetron Injectable 4 milliGRAM(s) IV Push every 8 hours PRN Nausea and/or Vomiting  oxyCODONE    IR 10 milliGRAM(s) Oral every 6 hours PRN Moderate Pain (4 - 6)      CAPILLARY BLOOD GLUCOSE      POCT Blood Glucose.: 207 mg/dL (19 Dec 2023 12:32)  POCT Blood Glucose.: 135 mg/dL (19 Dec 2023 08:41)  POCT Blood Glucose.: 156 mg/dL (18 Dec 2023 21:49)  POCT Blood Glucose.: 130 mg/dL (18 Dec 2023 20:39)  POCT Blood Glucose.: 149 mg/dL (18 Dec 2023 17:52)    I&O's Summary    18 Dec 2023 07:01  -  19 Dec 2023 07:00  --------------------------------------------------------  IN: 480 mL / OUT: 700 mL / NET: -220 mL        PHYSICAL EXAM:  Vital Signs Last 24 Hrs  T(C): 36.7 (19 Dec 2023 10:04), Max: 36.7 (18 Dec 2023 23:36)  T(F): 98.1 (19 Dec 2023 10:04), Max: 98.1 (19 Dec 2023 10:04)  HR: 65 (19 Dec 2023 10:04) (62 - 76)  BP: 112/69 (19 Dec 2023 10:04) (106/68 - 127/71)  BP(mean): --  RR: 18 (19 Dec 2023 10:04) (17 - 18)  SpO2: 99% (19 Dec 2023 10:04) (96% - 100%)    Parameters below as of 19 Dec 2023 10:04  Patient On (Oxygen Delivery Method): room air        CONSTITUTIONAL: NAD,  EYES: PERRLA; conjunctiva and sclera clear  ENMT: Moist oral mucosa, no pharyngeal injection or exudates;   NECK: Supple, no palpable masses;  RESPIRATORY: Normal respiratory effort; lungs are clear to auscultation bilaterally  CARDIOVASCULAR: Regular rate and rhythm, normal S1 and S2, no murmur/rub/gallop; No lower extremity edema; Peripheral pulses are 2+ bilaterally  ABDOMEN: Nontender to palpation, normoactive bowel sounds, no rebound/guarding;   MUSCLOSKELETAL:   no clubbing or cyanosis of digits; no joint swelling or tenderness to palpation  PSYCH: A+O to person, place, and time; affect appropriate  NEUROLOGY: CN 2-12 are intact and symmetric; no gross sensory deficits;   SKIN: No rashes;     LABS:                        12.5   11.47 )-----------( 465      ( 18 Dec 2023 07:30 )             38.1     12-18    136  |  103  |  22  ----------------------------<  180<H>  3.7   |  18<L>  |  0.83    Ca    9.1      18 Dec 2023 07:30  Phos  3.9     12-18  Mg     1.90     12-18            Urinalysis Basic - ( 18 Dec 2023 07:30 )    Color: x / Appearance: x / SG: x / pH: x  Gluc: 180 mg/dL / Ketone: x  / Bili: x / Urobili: x   Blood: x / Protein: x / Nitrite: x   Leuk Esterase: x / RBC: x / WBC x   Sq Epi: x / Non Sq Epi: x / Bacteria: x          RADIOLOGY & ADDITIONAL TESTS:  Results Reviewed:   Imaging Personally Reviewed:  Electrocardiogram Personally Reviewed:    COORDINATION OF CARE:  Care Discussed with Consultants/Other Providers [Y/N]:  Prior or Outpatient Records Reviewed [Y/N]:   [facility] Division of Hospital Medicine  Anderson Yates MD  Pager [phone]      Patient is a 53y old  Female who presents with a chief complaint of Headache, tinnitus (18 Dec 2023 16:50)      SUBJECTIVE / OVERNIGHT EVENTS:    pt cont to report headache, slightly improved since yesterday, but still debilitating. Tearful for the prolonged headache episode    ADDITIONAL REVIEW OF SYSTEMS:    RESPIRATORY: No cough, wheezing, chills or hemoptysis; No shortness of breath  CARDIOVASCULAR: No chest pain, palpitations, dizziness, or leg swelling  GASTROINTESTINAL: No abdominal or epigastric pain. No nausea, vomiting, or hematemesis; No diarrhea or constipation. No melena or hematochezia.    MEDICATIONS  (STANDING):  acetaZOLAMIDE    Tablet 500 milliGRAM(s) Oral two times a day  buMETAnide 2 milliGRAM(s) Oral daily  carvedilol 3.125 milliGRAM(s) Oral every 12 hours  dextrose 5%. 1000 milliLiter(s) (50 mL/Hr) IV Continuous <Continuous>  dextrose 5%. 1000 milliLiter(s) (100 mL/Hr) IV Continuous <Continuous>  dextrose 50% Injectable 25 Gram(s) IV Push once  dextrose 50% Injectable 25 Gram(s) IV Push once  dextrose 50% Injectable 12.5 Gram(s) IV Push once  glucagon  Injectable 1 milliGRAM(s) IntraMuscular once  insulin lispro (ADMELOG) corrective regimen sliding scale   SubCutaneous at bedtime  insulin lispro (ADMELOG) corrective regimen sliding scale   SubCutaneous three times a day before meals  insulin lispro Injectable (ADMELOG) 15 Unit(s) SubCutaneous three times a day before meals  lisinopril 10 milliGRAM(s) Oral daily  naloxegol 25 milliGRAM(s) Oral daily  spironolactone 25 milliGRAM(s) Oral daily    MEDICATIONS  (PRN):  acetaminophen     Tablet .. 650 milliGRAM(s) Oral every 6 hours PRN Temp greater or equal to 38C (100.4F), Mild Pain (1 - 3)  acetaminophen   IVPB .. 1000 milliGRAM(s) IV Intermittent once PRN Mild Pain (1 - 3)  aluminum hydroxide/magnesium hydroxide/simethicone Suspension 30 milliLiter(s) Oral every 4 hours PRN Dyspepsia  baclofen 5 milliGRAM(s) Oral every 8 hours PRN Muscle Spasm  dextrose Oral Gel 15 Gram(s) Oral once PRN Blood Glucose LESS THAN 70 milliGRAM(s)/deciliter  HYDROmorphone  Injectable 0.5 milliGRAM(s) IV Push every 8 hours PRN Severe Pain (7 - 10)  melatonin 3 milliGRAM(s) Oral at bedtime PRN Insomnia  ondansetron Injectable 4 milliGRAM(s) IV Push every 8 hours PRN Nausea and/or Vomiting  oxyCODONE    IR 10 milliGRAM(s) Oral every 6 hours PRN Moderate Pain (4 - 6)      CAPILLARY BLOOD GLUCOSE      POCT Blood Glucose.: 207 mg/dL (19 Dec 2023 12:32)  POCT Blood Glucose.: 135 mg/dL (19 Dec 2023 08:41)  POCT Blood Glucose.: 156 mg/dL (18 Dec 2023 21:49)  POCT Blood Glucose.: 130 mg/dL (18 Dec 2023 20:39)  POCT Blood Glucose.: 149 mg/dL (18 Dec 2023 17:52)    I&O's Summary    18 Dec 2023 07:01  -  19 Dec 2023 07:00  --------------------------------------------------------  IN: 480 mL / OUT: 700 mL / NET: -220 mL        PHYSICAL EXAM:  Vital Signs Last 24 Hrs  T(C): 36.7 (19 Dec 2023 10:04), Max: 36.7 (18 Dec 2023 23:36)  T(F): 98.1 (19 Dec 2023 10:04), Max: 98.1 (19 Dec 2023 10:04)  HR: 65 (19 Dec 2023 10:04) (62 - 76)  BP: 112/69 (19 Dec 2023 10:04) (106/68 - 127/71)  BP(mean): --  RR: 18 (19 Dec 2023 10:04) (17 - 18)  SpO2: 99% (19 Dec 2023 10:04) (96% - 100%)    Parameters below as of 19 Dec 2023 10:04  Patient On (Oxygen Delivery Method): room air        CONSTITUTIONAL: NAD,  EYES: PERRLA; conjunctiva and sclera clear  ENMT: Moist oral mucosa, no pharyngeal injection or exudates;   NECK: Supple, no palpable masses;  RESPIRATORY: Normal respiratory effort; lungs are clear to auscultation bilaterally  CARDIOVASCULAR: Regular rate and rhythm, normal S1 and S2, no murmur/rub/gallop; No lower extremity edema; Peripheral pulses are 2+ bilaterally  ABDOMEN: Nontender to palpation, normoactive bowel sounds, no rebound/guarding;   MUSCLOSKELETAL:   no clubbing or cyanosis of digits; no joint swelling or tenderness to palpation  PSYCH: A+O to person, place, and time; affect appropriate  NEUROLOGY: CN 2-12 are intact and symmetric; no gross sensory deficits;   SKIN: No rashes;     LABS:                        12.5   11.47 )-----------( 465      ( 18 Dec 2023 07:30 )             38.1     12-18    136  |  103  |  22  ----------------------------<  180<H>  3.7   |  18<L>  |  0.83    Ca    9.1      18 Dec 2023 07:30  Phos  3.9     12-18  Mg     1.90     12-18            Urinalysis Basic - ( 18 Dec 2023 07:30 )    Color: x / Appearance: x / SG: x / pH: x  Gluc: 180 mg/dL / Ketone: x  / Bili: x / Urobili: x   Blood: x / Protein: x / Nitrite: x   Leuk Esterase: x / RBC: x / WBC x   Sq Epi: x / Non Sq Epi: x / Bacteria: x          RADIOLOGY & ADDITIONAL TESTS:  Results Reviewed:   Imaging Personally Reviewed:  Electrocardiogram Personally Reviewed:    COORDINATION OF CARE:  Care Discussed with Consultants/Other Providers [Y/N]:  Prior or Outpatient Records Reviewed [Y/N]:

## 2023-12-19 NOTE — PROGRESS NOTE ADULT - PROBLEM SELECTOR PLAN 1
Patient reports to have band-like headache, tinnitus, blurry vision, and photophobia, consistent with her previous episode of idiopathic intracranial HTN /pseudotumor cerebri   -CT head and venogram is negative for any acute pathology   -Unable to get MRI/MRV brain, MRI orbits w/wo contrast as pt with neurostimulator which is not MRI compatible.  -ED attempted to perform LP 10X but was not successful, Had LP on 12/11 with IR, opening pressure measured 11 cm water.   -Post LP pt has worse headache- neuro rec blood patch.   -anesthesia rec CTH and IR consult for blood path if imaging shows signs of intracranial hypotension   -Head ct with contrast 12/16: Expanded sella turcica with CSF attenuation due to near-complete empty sella versus underlying arachnoid cyst. Findings may be seen in the setting of intracranial hypertension.  -f/u neurology re: headache management   -c/w diamox and oxy/dilaudid prn for now.

## 2023-12-20 LAB
ANION GAP SERPL CALC-SCNC: 13 MMOL/L — SIGNIFICANT CHANGE UP (ref 7–14)
ANION GAP SERPL CALC-SCNC: 13 MMOL/L — SIGNIFICANT CHANGE UP (ref 7–14)
BASOPHILS # BLD AUTO: 0.04 K/UL — SIGNIFICANT CHANGE UP (ref 0–0.2)
BASOPHILS # BLD AUTO: 0.04 K/UL — SIGNIFICANT CHANGE UP (ref 0–0.2)
BASOPHILS NFR BLD AUTO: 0.3 % — SIGNIFICANT CHANGE UP (ref 0–2)
BASOPHILS NFR BLD AUTO: 0.3 % — SIGNIFICANT CHANGE UP (ref 0–2)
BUN SERPL-MCNC: 28 MG/DL — HIGH (ref 7–23)
BUN SERPL-MCNC: 28 MG/DL — HIGH (ref 7–23)
CALCIUM SERPL-MCNC: 8.8 MG/DL — SIGNIFICANT CHANGE UP (ref 8.4–10.5)
CALCIUM SERPL-MCNC: 8.8 MG/DL — SIGNIFICANT CHANGE UP (ref 8.4–10.5)
CHLORIDE SERPL-SCNC: 104 MMOL/L — SIGNIFICANT CHANGE UP (ref 98–107)
CHLORIDE SERPL-SCNC: 104 MMOL/L — SIGNIFICANT CHANGE UP (ref 98–107)
CO2 SERPL-SCNC: 19 MMOL/L — LOW (ref 22–31)
CO2 SERPL-SCNC: 19 MMOL/L — LOW (ref 22–31)
CREAT SERPL-MCNC: 0.97 MG/DL — SIGNIFICANT CHANGE UP (ref 0.5–1.3)
CREAT SERPL-MCNC: 0.97 MG/DL — SIGNIFICANT CHANGE UP (ref 0.5–1.3)
EGFR: 70 ML/MIN/1.73M2 — SIGNIFICANT CHANGE UP
EGFR: 70 ML/MIN/1.73M2 — SIGNIFICANT CHANGE UP
EOSINOPHIL # BLD AUTO: 0.29 K/UL — SIGNIFICANT CHANGE UP (ref 0–0.5)
EOSINOPHIL # BLD AUTO: 0.29 K/UL — SIGNIFICANT CHANGE UP (ref 0–0.5)
EOSINOPHIL NFR BLD AUTO: 2.5 % — SIGNIFICANT CHANGE UP (ref 0–6)
EOSINOPHIL NFR BLD AUTO: 2.5 % — SIGNIFICANT CHANGE UP (ref 0–6)
GLUCOSE BLDC GLUCOMTR-MCNC: 135 MG/DL — HIGH (ref 70–99)
GLUCOSE BLDC GLUCOMTR-MCNC: 135 MG/DL — HIGH (ref 70–99)
GLUCOSE BLDC GLUCOMTR-MCNC: 141 MG/DL — HIGH (ref 70–99)
GLUCOSE BLDC GLUCOMTR-MCNC: 141 MG/DL — HIGH (ref 70–99)
GLUCOSE BLDC GLUCOMTR-MCNC: 145 MG/DL — HIGH (ref 70–99)
GLUCOSE BLDC GLUCOMTR-MCNC: 145 MG/DL — HIGH (ref 70–99)
GLUCOSE BLDC GLUCOMTR-MCNC: 160 MG/DL — HIGH (ref 70–99)
GLUCOSE BLDC GLUCOMTR-MCNC: 160 MG/DL — HIGH (ref 70–99)
GLUCOSE SERPL-MCNC: 171 MG/DL — HIGH (ref 70–99)
GLUCOSE SERPL-MCNC: 171 MG/DL — HIGH (ref 70–99)
HCT VFR BLD CALC: 37.4 % — SIGNIFICANT CHANGE UP (ref 34.5–45)
HCT VFR BLD CALC: 37.4 % — SIGNIFICANT CHANGE UP (ref 34.5–45)
HGB BLD-MCNC: 12 G/DL — SIGNIFICANT CHANGE UP (ref 11.5–15.5)
HGB BLD-MCNC: 12 G/DL — SIGNIFICANT CHANGE UP (ref 11.5–15.5)
IANC: 5.95 K/UL — SIGNIFICANT CHANGE UP (ref 1.8–7.4)
IANC: 5.95 K/UL — SIGNIFICANT CHANGE UP (ref 1.8–7.4)
IMM GRANULOCYTES NFR BLD AUTO: 0.3 % — SIGNIFICANT CHANGE UP (ref 0–0.9)
IMM GRANULOCYTES NFR BLD AUTO: 0.3 % — SIGNIFICANT CHANGE UP (ref 0–0.9)
LYMPHOCYTES # BLD AUTO: 4.9 K/UL — HIGH (ref 1–3.3)
LYMPHOCYTES # BLD AUTO: 4.9 K/UL — HIGH (ref 1–3.3)
LYMPHOCYTES # BLD AUTO: 41.5 % — SIGNIFICANT CHANGE UP (ref 13–44)
LYMPHOCYTES # BLD AUTO: 41.5 % — SIGNIFICANT CHANGE UP (ref 13–44)
MAGNESIUM SERPL-MCNC: 1.8 MG/DL — SIGNIFICANT CHANGE UP (ref 1.6–2.6)
MAGNESIUM SERPL-MCNC: 1.8 MG/DL — SIGNIFICANT CHANGE UP (ref 1.6–2.6)
MCHC RBC-ENTMCNC: 29.6 PG — SIGNIFICANT CHANGE UP (ref 27–34)
MCHC RBC-ENTMCNC: 29.6 PG — SIGNIFICANT CHANGE UP (ref 27–34)
MCHC RBC-ENTMCNC: 32.1 GM/DL — SIGNIFICANT CHANGE UP (ref 32–36)
MCHC RBC-ENTMCNC: 32.1 GM/DL — SIGNIFICANT CHANGE UP (ref 32–36)
MCV RBC AUTO: 92.3 FL — SIGNIFICANT CHANGE UP (ref 80–100)
MCV RBC AUTO: 92.3 FL — SIGNIFICANT CHANGE UP (ref 80–100)
MONOCYTES # BLD AUTO: 0.61 K/UL — SIGNIFICANT CHANGE UP (ref 0–0.9)
MONOCYTES # BLD AUTO: 0.61 K/UL — SIGNIFICANT CHANGE UP (ref 0–0.9)
MONOCYTES NFR BLD AUTO: 5.2 % — SIGNIFICANT CHANGE UP (ref 2–14)
MONOCYTES NFR BLD AUTO: 5.2 % — SIGNIFICANT CHANGE UP (ref 2–14)
NEUTROPHILS # BLD AUTO: 5.95 K/UL — SIGNIFICANT CHANGE UP (ref 1.8–7.4)
NEUTROPHILS # BLD AUTO: 5.95 K/UL — SIGNIFICANT CHANGE UP (ref 1.8–7.4)
NEUTROPHILS NFR BLD AUTO: 50.2 % — SIGNIFICANT CHANGE UP (ref 43–77)
NEUTROPHILS NFR BLD AUTO: 50.2 % — SIGNIFICANT CHANGE UP (ref 43–77)
NRBC # BLD: 0 /100 WBCS — SIGNIFICANT CHANGE UP (ref 0–0)
NRBC # BLD: 0 /100 WBCS — SIGNIFICANT CHANGE UP (ref 0–0)
NRBC # FLD: 0 K/UL — SIGNIFICANT CHANGE UP (ref 0–0)
NRBC # FLD: 0 K/UL — SIGNIFICANT CHANGE UP (ref 0–0)
PHOSPHATE SERPL-MCNC: 4.1 MG/DL — SIGNIFICANT CHANGE UP (ref 2.5–4.5)
PHOSPHATE SERPL-MCNC: 4.1 MG/DL — SIGNIFICANT CHANGE UP (ref 2.5–4.5)
PLATELET # BLD AUTO: 481 K/UL — HIGH (ref 150–400)
PLATELET # BLD AUTO: 481 K/UL — HIGH (ref 150–400)
POTASSIUM SERPL-MCNC: 3.9 MMOL/L — SIGNIFICANT CHANGE UP (ref 3.5–5.3)
POTASSIUM SERPL-MCNC: 3.9 MMOL/L — SIGNIFICANT CHANGE UP (ref 3.5–5.3)
POTASSIUM SERPL-SCNC: 3.9 MMOL/L — SIGNIFICANT CHANGE UP (ref 3.5–5.3)
POTASSIUM SERPL-SCNC: 3.9 MMOL/L — SIGNIFICANT CHANGE UP (ref 3.5–5.3)
RBC # BLD: 4.05 M/UL — SIGNIFICANT CHANGE UP (ref 3.8–5.2)
RBC # BLD: 4.05 M/UL — SIGNIFICANT CHANGE UP (ref 3.8–5.2)
RBC # FLD: 13.2 % — SIGNIFICANT CHANGE UP (ref 10.3–14.5)
RBC # FLD: 13.2 % — SIGNIFICANT CHANGE UP (ref 10.3–14.5)
SODIUM SERPL-SCNC: 136 MMOL/L — SIGNIFICANT CHANGE UP (ref 135–145)
SODIUM SERPL-SCNC: 136 MMOL/L — SIGNIFICANT CHANGE UP (ref 135–145)
WBC # BLD: 11.82 K/UL — HIGH (ref 3.8–10.5)
WBC # BLD: 11.82 K/UL — HIGH (ref 3.8–10.5)
WBC # FLD AUTO: 11.82 K/UL — HIGH (ref 3.8–10.5)
WBC # FLD AUTO: 11.82 K/UL — HIGH (ref 3.8–10.5)

## 2023-12-20 PROCEDURE — 99233 SBSQ HOSP IP/OBS HIGH 50: CPT

## 2023-12-20 RX ORDER — HYDROMORPHONE HYDROCHLORIDE 2 MG/ML
0.5 INJECTION INTRAMUSCULAR; INTRAVENOUS; SUBCUTANEOUS ONCE
Refills: 0 | Status: DISCONTINUED | OUTPATIENT
Start: 2023-12-20 | End: 2023-12-20

## 2023-12-20 RX ORDER — HYDROMORPHONE HYDROCHLORIDE 2 MG/ML
1 INJECTION INTRAMUSCULAR; INTRAVENOUS; SUBCUTANEOUS EVERY 8 HOURS
Refills: 0 | Status: DISCONTINUED | OUTPATIENT
Start: 2023-12-20 | End: 2023-12-21

## 2023-12-20 RX ORDER — LACTULOSE 10 G/15ML
10 SOLUTION ORAL ONCE
Refills: 0 | Status: COMPLETED | OUTPATIENT
Start: 2023-12-20 | End: 2023-12-20

## 2023-12-20 RX ADMIN — Medication 15 UNIT(S): at 12:57

## 2023-12-20 RX ADMIN — BUMETANIDE 2 MILLIGRAM(S): 0.25 INJECTION INTRAMUSCULAR; INTRAVENOUS at 06:42

## 2023-12-20 RX ADMIN — OXYCODONE HYDROCHLORIDE 10 MILLIGRAM(S): 5 TABLET ORAL at 12:57

## 2023-12-20 RX ADMIN — HYDROMORPHONE HYDROCHLORIDE 1 MILLIGRAM(S): 2 INJECTION INTRAMUSCULAR; INTRAVENOUS; SUBCUTANEOUS at 19:55

## 2023-12-20 RX ADMIN — HYDROMORPHONE HYDROCHLORIDE 0.5 MILLIGRAM(S): 2 INJECTION INTRAMUSCULAR; INTRAVENOUS; SUBCUTANEOUS at 02:55

## 2023-12-20 RX ADMIN — Medication 5 MILLIGRAM(S): at 16:21

## 2023-12-20 RX ADMIN — LACTULOSE 10 GRAM(S): 10 SOLUTION ORAL at 23:23

## 2023-12-20 RX ADMIN — OXYCODONE HYDROCHLORIDE 10 MILLIGRAM(S): 5 TABLET ORAL at 00:38

## 2023-12-20 RX ADMIN — SPIRONOLACTONE 25 MILLIGRAM(S): 25 TABLET, FILM COATED ORAL at 06:44

## 2023-12-20 RX ADMIN — HYDROMORPHONE HYDROCHLORIDE 0.5 MILLIGRAM(S): 2 INJECTION INTRAMUSCULAR; INTRAVENOUS; SUBCUTANEOUS at 04:53

## 2023-12-20 RX ADMIN — HYDROMORPHONE HYDROCHLORIDE 1 MILLIGRAM(S): 2 INJECTION INTRAMUSCULAR; INTRAVENOUS; SUBCUTANEOUS at 19:25

## 2023-12-20 RX ADMIN — HYDROMORPHONE HYDROCHLORIDE 1 MILLIGRAM(S): 2 INJECTION INTRAMUSCULAR; INTRAVENOUS; SUBCUTANEOUS at 11:02

## 2023-12-20 RX ADMIN — LISINOPRIL 10 MILLIGRAM(S): 2.5 TABLET ORAL at 06:42

## 2023-12-20 RX ADMIN — INSULIN GLARGINE 45 UNIT(S): 100 INJECTION, SOLUTION SUBCUTANEOUS at 21:42

## 2023-12-20 RX ADMIN — Medication 15 UNIT(S): at 09:19

## 2023-12-20 RX ADMIN — CARVEDILOL PHOSPHATE 3.12 MILLIGRAM(S): 80 CAPSULE, EXTENDED RELEASE ORAL at 17:54

## 2023-12-20 RX ADMIN — Medication 2 TABLET(S): at 16:21

## 2023-12-20 RX ADMIN — ONDANSETRON 4 MILLIGRAM(S): 8 TABLET, FILM COATED ORAL at 13:54

## 2023-12-20 RX ADMIN — OXYCODONE HYDROCHLORIDE 10 MILLIGRAM(S): 5 TABLET ORAL at 06:42

## 2023-12-20 RX ADMIN — HYDROMORPHONE HYDROCHLORIDE 1 MILLIGRAM(S): 2 INJECTION INTRAMUSCULAR; INTRAVENOUS; SUBCUTANEOUS at 11:32

## 2023-12-20 RX ADMIN — HYDROMORPHONE HYDROCHLORIDE 0.5 MILLIGRAM(S): 2 INJECTION INTRAMUSCULAR; INTRAVENOUS; SUBCUTANEOUS at 05:23

## 2023-12-20 RX ADMIN — OXYCODONE HYDROCHLORIDE 10 MILLIGRAM(S): 5 TABLET ORAL at 13:57

## 2023-12-20 RX ADMIN — Medication 15 UNIT(S): at 17:54

## 2023-12-20 RX ADMIN — ACETAZOLAMIDE 500 MILLIGRAM(S): 250 TABLET ORAL at 17:53

## 2023-12-20 RX ADMIN — OXYCODONE HYDROCHLORIDE 10 MILLIGRAM(S): 5 TABLET ORAL at 07:12

## 2023-12-20 RX ADMIN — ONDANSETRON 4 MILLIGRAM(S): 8 TABLET, FILM COATED ORAL at 21:42

## 2023-12-20 RX ADMIN — NALOXEGOL OXALATE 25 MILLIGRAM(S): 12.5 TABLET, FILM COATED ORAL at 11:01

## 2023-12-20 RX ADMIN — Medication 1: at 12:58

## 2023-12-20 RX ADMIN — HYDROMORPHONE HYDROCHLORIDE 0.5 MILLIGRAM(S): 2 INJECTION INTRAMUSCULAR; INTRAVENOUS; SUBCUTANEOUS at 02:25

## 2023-12-20 RX ADMIN — OXYCODONE HYDROCHLORIDE 10 MILLIGRAM(S): 5 TABLET ORAL at 21:41

## 2023-12-20 RX ADMIN — OXYCODONE HYDROCHLORIDE 10 MILLIGRAM(S): 5 TABLET ORAL at 02:22

## 2023-12-20 RX ADMIN — OXYCODONE HYDROCHLORIDE 10 MILLIGRAM(S): 5 TABLET ORAL at 01:08

## 2023-12-20 RX ADMIN — ACETAZOLAMIDE 500 MILLIGRAM(S): 250 TABLET ORAL at 06:43

## 2023-12-20 NOTE — PROGRESS NOTE ADULT - PROBLEM SELECTOR PLAN 1
Patient reports to have band-like headache, tinnitus, blurry vision, and photophobia, consistent with her previous episode of idiopathic intracranial HTN /pseudotumor cerebri   -CT head and venogram is negative for any acute pathology   -Unable to get MRI/MRV brain, MRI orbits w/wo contrast as pt with neurostimulator which is not MRI compatible.  -ED attempted to perform LP 10X but was not successful, Had LP on 12/11 with IR, opening pressure measured 11 cm water.   -Post LP pt has worse headache- neuro rec blood patch.   -anesthesia rec CTH and IR consult for blood path if imaging shows signs of intracranial hypotension   -Head ct with contrast 12/16: Expanded sella turcica with CSF attenuation due to near-complete empty sella versus underlying arachnoid cyst. Findings may be seen in the setting of intracranial hypertension.  -f/u neurology re: headache management   -c/w diamox and oxy/dilaudid prn for now. added Esgic prn

## 2023-12-20 NOTE — PROGRESS NOTE ADULT - SUBJECTIVE AND OBJECTIVE BOX
[facility] Division of [facility] Medicine  Anderson Yates MD  Pager [phone]      Patient is a 53y old  Female who presents with a chief complaint of Headache, tinnitus (19 Dec 2023 13:59)      SUBJECTIVE / OVERNIGHT EVENTS:    pt cont to report severe headache, received iv dilaudid 1mg. no new neuro deficit     ADDITIONAL REVIEW OF SYSTEMS:    RESPIRATORY: No cough, wheezing, chills or hemoptysis; No shortness of breath  CARDIOVASCULAR: No chest pain, palpitations, dizziness, or leg swelling  GASTROINTESTINAL: No abdominal or epigastric pain. No nausea, vomiting, or hematemesis; No diarrhea or constipation. No melena or hematochezia.      MEDICATIONS  (STANDING):  acetaZOLAMIDE    Tablet 500 milliGRAM(s) Oral two times a day  buMETAnide 2 milliGRAM(s) Oral daily  carvedilol 3.125 milliGRAM(s) Oral every 12 hours  dextrose 5%. 1000 milliLiter(s) (50 mL/Hr) IV Continuous <Continuous>  dextrose 5%. 1000 milliLiter(s) (100 mL/Hr) IV Continuous <Continuous>  dextrose 50% Injectable 25 Gram(s) IV Push once  dextrose 50% Injectable 25 Gram(s) IV Push once  dextrose 50% Injectable 12.5 Gram(s) IV Push once  glucagon  Injectable 1 milliGRAM(s) IntraMuscular once  insulin glargine Injectable (LANTUS) 45 Unit(s) SubCutaneous at bedtime  insulin lispro (ADMELOG) corrective regimen sliding scale   SubCutaneous at bedtime  insulin lispro (ADMELOG) corrective regimen sliding scale   SubCutaneous three times a day before meals  insulin lispro Injectable (ADMELOG) 15 Unit(s) SubCutaneous three times a day before meals  lisinopril 10 milliGRAM(s) Oral daily  naloxegol 25 milliGRAM(s) Oral daily  spironolactone 25 milliGRAM(s) Oral daily    MEDICATIONS  (PRN):  acetaminophen 325 mG/butalbital 50 mG/caffeine 40 mG 2 Tablet(s) Oral every 8 hours PRN migraine  aluminum hydroxide/magnesium hydroxide/simethicone Suspension 30 milliLiter(s) Oral every 4 hours PRN Dyspepsia  baclofen 5 milliGRAM(s) Oral every 8 hours PRN Muscle Spasm  dextrose Oral Gel 15 Gram(s) Oral once PRN Blood Glucose LESS THAN 70 milliGRAM(s)/deciliter  HYDROmorphone  Injectable 1 milliGRAM(s) IV Push every 8 hours PRN Severe Pain (7 - 10)  melatonin 6 milliGRAM(s) Oral at bedtime PRN Sleep  ondansetron Injectable 4 milliGRAM(s) IV Push every 8 hours PRN Nausea and/or Vomiting  oxyCODONE    IR 10 milliGRAM(s) Oral every 6 hours PRN Moderate Pain (4 - 6)      CAPILLARY BLOOD GLUCOSE      POCT Blood Glucose.: 141 mg/dL (20 Dec 2023 08:36)  POCT Blood Glucose.: 210 mg/dL (19 Dec 2023 22:24)  POCT Blood Glucose.: 186 mg/dL (19 Dec 2023 17:44)  POCT Blood Glucose.: 207 mg/dL (19 Dec 2023 12:32)    I&O's Summary    19 Dec 2023 07:01  -  20 Dec 2023 07:00  --------------------------------------------------------  IN: 680 mL / OUT: 800 mL / NET: -120 mL        PHYSICAL EXAM:  Vital Signs Last 24 Hrs  T(C): 36.7 (20 Dec 2023 10:33), Max: 37.1 (20 Dec 2023 06:25)  T(F): 98 (20 Dec 2023 10:33), Max: 98.7 (20 Dec 2023 06:25)  HR: 57 (20 Dec 2023 10:33) (57 - 70)  BP: 136/99 (20 Dec 2023 10:33) (109/54 - 136/99)  BP(mean): --  RR: 18 (20 Dec 2023 10:33) (16 - 18)  SpO2: 99% (20 Dec 2023 10:33) (99% - 100%)    Parameters below as of 20 Dec 2023 10:33  Patient On (Oxygen Delivery Method): room air      CONSTITUTIONAL: NAD,  EYES: PERRLA; conjunctiva and sclera clear  ENMT: Moist oral mucosa, no pharyngeal injection or exudates;   NECK: Supple, no palpable masses;  RESPIRATORY: Normal respiratory effort; lungs are clear to auscultation bilaterally  CARDIOVASCULAR: Regular rate and rhythm, normal S1 and S2, no murmur/rub/gallop; No lower extremity edema; Peripheral pulses are 2+ bilaterally  ABDOMEN: Nontender to palpation, normoactive bowel sounds, no rebound/guarding;   MUSCLOSKELETAL:   no clubbing or cyanosis of digits; no joint swelling or tenderness to palpation  PSYCH: A+O to person, place, and time; affect appropriate  NEUROLOGY: CN 2-12 are intact and symmetric; no gross sensory deficits;   SKIN: No rashes;     LABS:                        12.0   11.82 )-----------( 481      ( 20 Dec 2023 06:26 )             37.4     12-20    136  |  104  |  28<H>  ----------------------------<  171<H>  3.9   |  19<L>  |  0.97    Ca    8.8      20 Dec 2023 06:26  Phos  4.1     12-20  Mg     1.80     12-20            Urinalysis Basic - ( 20 Dec 2023 06:26 )    Color: x / Appearance: x / SG: x / pH: x  Gluc: 171 mg/dL / Ketone: x  / Bili: x / Urobili: x   Blood: x / Protein: x / Nitrite: x   Leuk Esterase: x / RBC: x / WBC x   Sq Epi: x / Non Sq Epi: x / Bacteria: x          RADIOLOGY & ADDITIONAL TESTS:  Results Reviewed:   Imaging Personally Reviewed:  Electrocardiogram Personally Reviewed:    COORDINATION OF CARE:  Care Discussed with Consultants/Other Providers [Y/N]:  Prior or Outpatient Records Reviewed [Y/N]:   [facility] Division of [facility] Medicine  Anderson Yates MD  Pager [phone]      Patient is a 53y old  Female who presents with a chief complaint of Headache, tinnitus (19 Dec 2023 13:59)      SUBJECTIVE / OVERNIGHT EVENTS:    pt cont to report severe headache, received iv dilaudid 1mg. no new neuro deficit     ADDITIONAL REVIEW OF SYSTEMS:    RESPIRATORY: No cough, wheezing, chills or hemoptysis; No shortness of breath  CARDIOVASCULAR: No chest pain, palpitations, dizziness, or leg swelling  GASTROINTESTINAL: No abdominal or epigastric pain. No nausea, vomiting, or hematemesis; No diarrhea or constipation. No melena or hematochezia.      MEDICATIONS  (STANDING):  acetaZOLAMIDE    Tablet 500 milliGRAM(s) Oral two times a day  buMETAnide 2 milliGRAM(s) Oral daily  carvedilol 3.125 milliGRAM(s) Oral every 12 hours  dextrose 5%. 1000 milliLiter(s) (50 mL/Hr) IV Continuous <Continuous>  dextrose 5%. 1000 milliLiter(s) (100 mL/Hr) IV Continuous <Continuous>  dextrose 50% Injectable 25 Gram(s) IV Push once  dextrose 50% Injectable 25 Gram(s) IV Push once  dextrose 50% Injectable 12.5 Gram(s) IV Push once  glucagon  Injectable 1 milliGRAM(s) IntraMuscular once  insulin glargine Injectable (LANTUS) 45 Unit(s) SubCutaneous at bedtime  insulin lispro (ADMELOG) corrective regimen sliding scale   SubCutaneous at bedtime  insulin lispro (ADMELOG) corrective regimen sliding scale   SubCutaneous three times a day before meals  insulin lispro Injectable (ADMELOG) 15 Unit(s) SubCutaneous three times a day before meals  lisinopril 10 milliGRAM(s) Oral daily  naloxegol 25 milliGRAM(s) Oral daily  spironolactone 25 milliGRAM(s) Oral daily    MEDICATIONS  (PRN):  acetaminophen 325 mG/butalbital 50 mG/caffeine 40 mG 2 Tablet(s) Oral every 8 hours PRN migraine  aluminum hydroxide/magnesium hydroxide/simethicone Suspension 30 milliLiter(s) Oral every 4 hours PRN Dyspepsia  baclofen 5 milliGRAM(s) Oral every 8 hours PRN Muscle Spasm  dextrose Oral Gel 15 Gram(s) Oral once PRN Blood Glucose LESS THAN 70 milliGRAM(s)/deciliter  HYDROmorphone  Injectable 1 milliGRAM(s) IV Push every 8 hours PRN Severe Pain (7 - 10)  melatonin 6 milliGRAM(s) Oral at bedtime PRN Sleep  ondansetron Injectable 4 milliGRAM(s) IV Push every 8 hours PRN Nausea and/or Vomiting  oxyCODONE    IR 10 milliGRAM(s) Oral every 6 hours PRN Moderate Pain (4 - 6)      CAPILLARY BLOOD GLUCOSE      POCT Blood Glucose.: 141 mg/dL (20 Dec 2023 08:36)  POCT Blood Glucose.: 210 mg/dL (19 Dec 2023 22:24)  POCT Blood Glucose.: 186 mg/dL (19 Dec 2023 17:44)  POCT Blood Glucose.: 207 mg/dL (19 Dec 2023 12:32)    I&O's Summary    19 Dec 2023 07:01  -  20 Dec 2023 07:00  --------------------------------------------------------  IN: 680 mL / OUT: 800 mL / NET: -120 mL        PHYSICAL EXAM:  Vital Signs Last 24 Hrs  T(C): 36.7 (20 Dec 2023 10:33), Max: 37.1 (20 Dec 2023 06:25)  T(F): 98 (20 Dec 2023 10:33), Max: 98.7 (20 Dec 2023 06:25)  HR: 57 (20 Dec 2023 10:33) (57 - 70)  BP: 136/99 (20 Dec 2023 10:33) (109/54 - 136/99)  BP(mean): --  RR: 18 (20 Dec 2023 10:33) (16 - 18)  SpO2: 99% (20 Dec 2023 10:33) (99% - 100%)    Parameters below as of 20 Dec 2023 10:33  Patient On (Oxygen Delivery Method): room air      CONSTITUTIONAL: NAD,  EYES: PERRLA; conjunctiva and sclera clear  ENMT: Moist oral mucosa, no pharyngeal injection or exudates;   NECK: Supple, no palpable masses;  RESPIRATORY: Normal respiratory effort; lungs are clear to auscultation bilaterally  CARDIOVASCULAR: Regular rate and rhythm, normal S1 and S2, no murmur/rub/gallop; No lower extremity edema; Peripheral pulses are 2+ bilaterally  ABDOMEN: Nontender to palpation, normoactive bowel sounds, no rebound/guarding;   MUSCLOSKELETAL:   no clubbing or cyanosis of digits; no joint swelling or tenderness to palpation  PSYCH: A+O to person, place, and time; affect appropriate  NEUROLOGY: CN 2-12 are intact and symmetric; no gross sensory deficits;   SKIN: No rashes;     LABS:                        12.0   11.82 )-----------( 481      ( 20 Dec 2023 06:26 )             37.4     12-20    136  |  104  |  28<H>  ----------------------------<  171<H>  3.9   |  19<L>  |  0.97    Ca    8.8      20 Dec 2023 06:26  Phos  4.1     12-20  Mg     1.80     12-20            Urinalysis Basic - ( 20 Dec 2023 06:26 )    Color: x / Appearance: x / SG: x / pH: x  Gluc: 171 mg/dL / Ketone: x  / Bili: x / Urobili: x   Blood: x / Protein: x / Nitrite: x   Leuk Esterase: x / RBC: x / WBC x   Sq Epi: x / Non Sq Epi: x / Bacteria: x          RADIOLOGY & ADDITIONAL TESTS:  Results Reviewed:   Imaging Personally Reviewed:  Electrocardiogram Personally Reviewed:    COORDINATION OF CARE:  Care Discussed with Consultants/Other Providers [Y/N]:  Prior or Outpatient Records Reviewed [Y/N]:

## 2023-12-21 LAB
ANION GAP SERPL CALC-SCNC: 11 MMOL/L — SIGNIFICANT CHANGE UP (ref 7–14)
ANION GAP SERPL CALC-SCNC: 11 MMOL/L — SIGNIFICANT CHANGE UP (ref 7–14)
BASOPHILS # BLD AUTO: 0.05 K/UL — SIGNIFICANT CHANGE UP (ref 0–0.2)
BASOPHILS # BLD AUTO: 0.05 K/UL — SIGNIFICANT CHANGE UP (ref 0–0.2)
BASOPHILS NFR BLD AUTO: 0.5 % — SIGNIFICANT CHANGE UP (ref 0–2)
BASOPHILS NFR BLD AUTO: 0.5 % — SIGNIFICANT CHANGE UP (ref 0–2)
BUN SERPL-MCNC: 27 MG/DL — HIGH (ref 7–23)
BUN SERPL-MCNC: 27 MG/DL — HIGH (ref 7–23)
CALCIUM SERPL-MCNC: 9 MG/DL — SIGNIFICANT CHANGE UP (ref 8.4–10.5)
CALCIUM SERPL-MCNC: 9 MG/DL — SIGNIFICANT CHANGE UP (ref 8.4–10.5)
CHLORIDE SERPL-SCNC: 103 MMOL/L — SIGNIFICANT CHANGE UP (ref 98–107)
CHLORIDE SERPL-SCNC: 103 MMOL/L — SIGNIFICANT CHANGE UP (ref 98–107)
CO2 SERPL-SCNC: 19 MMOL/L — LOW (ref 22–31)
CO2 SERPL-SCNC: 19 MMOL/L — LOW (ref 22–31)
CREAT SERPL-MCNC: 0.93 MG/DL — SIGNIFICANT CHANGE UP (ref 0.5–1.3)
CREAT SERPL-MCNC: 0.93 MG/DL — SIGNIFICANT CHANGE UP (ref 0.5–1.3)
EGFR: 73 ML/MIN/1.73M2 — SIGNIFICANT CHANGE UP
EGFR: 73 ML/MIN/1.73M2 — SIGNIFICANT CHANGE UP
EOSINOPHIL # BLD AUTO: 0.26 K/UL — SIGNIFICANT CHANGE UP (ref 0–0.5)
EOSINOPHIL # BLD AUTO: 0.26 K/UL — SIGNIFICANT CHANGE UP (ref 0–0.5)
EOSINOPHIL NFR BLD AUTO: 2.5 % — SIGNIFICANT CHANGE UP (ref 0–6)
EOSINOPHIL NFR BLD AUTO: 2.5 % — SIGNIFICANT CHANGE UP (ref 0–6)
GLUCOSE BLDC GLUCOMTR-MCNC: 156 MG/DL — HIGH (ref 70–99)
GLUCOSE BLDC GLUCOMTR-MCNC: 156 MG/DL — HIGH (ref 70–99)
GLUCOSE BLDC GLUCOMTR-MCNC: 158 MG/DL — HIGH (ref 70–99)
GLUCOSE BLDC GLUCOMTR-MCNC: 158 MG/DL — HIGH (ref 70–99)
GLUCOSE BLDC GLUCOMTR-MCNC: 183 MG/DL — HIGH (ref 70–99)
GLUCOSE BLDC GLUCOMTR-MCNC: 183 MG/DL — HIGH (ref 70–99)
GLUCOSE BLDC GLUCOMTR-MCNC: 255 MG/DL — HIGH (ref 70–99)
GLUCOSE BLDC GLUCOMTR-MCNC: 255 MG/DL — HIGH (ref 70–99)
GLUCOSE SERPL-MCNC: 168 MG/DL — HIGH (ref 70–99)
GLUCOSE SERPL-MCNC: 168 MG/DL — HIGH (ref 70–99)
HCT VFR BLD CALC: 36.6 % — SIGNIFICANT CHANGE UP (ref 34.5–45)
HCT VFR BLD CALC: 36.6 % — SIGNIFICANT CHANGE UP (ref 34.5–45)
HGB BLD-MCNC: 11.9 G/DL — SIGNIFICANT CHANGE UP (ref 11.5–15.5)
HGB BLD-MCNC: 11.9 G/DL — SIGNIFICANT CHANGE UP (ref 11.5–15.5)
IANC: 5.48 K/UL — SIGNIFICANT CHANGE UP (ref 1.8–7.4)
IANC: 5.48 K/UL — SIGNIFICANT CHANGE UP (ref 1.8–7.4)
IMM GRANULOCYTES NFR BLD AUTO: 0.3 % — SIGNIFICANT CHANGE UP (ref 0–0.9)
IMM GRANULOCYTES NFR BLD AUTO: 0.3 % — SIGNIFICANT CHANGE UP (ref 0–0.9)
LYMPHOCYTES # BLD AUTO: 3.88 K/UL — HIGH (ref 1–3.3)
LYMPHOCYTES # BLD AUTO: 3.88 K/UL — HIGH (ref 1–3.3)
LYMPHOCYTES # BLD AUTO: 37.8 % — SIGNIFICANT CHANGE UP (ref 13–44)
LYMPHOCYTES # BLD AUTO: 37.8 % — SIGNIFICANT CHANGE UP (ref 13–44)
MAGNESIUM SERPL-MCNC: 2 MG/DL — SIGNIFICANT CHANGE UP (ref 1.6–2.6)
MAGNESIUM SERPL-MCNC: 2 MG/DL — SIGNIFICANT CHANGE UP (ref 1.6–2.6)
MCHC RBC-ENTMCNC: 29.5 PG — SIGNIFICANT CHANGE UP (ref 27–34)
MCHC RBC-ENTMCNC: 29.5 PG — SIGNIFICANT CHANGE UP (ref 27–34)
MCHC RBC-ENTMCNC: 32.5 GM/DL — SIGNIFICANT CHANGE UP (ref 32–36)
MCHC RBC-ENTMCNC: 32.5 GM/DL — SIGNIFICANT CHANGE UP (ref 32–36)
MCV RBC AUTO: 90.8 FL — SIGNIFICANT CHANGE UP (ref 80–100)
MCV RBC AUTO: 90.8 FL — SIGNIFICANT CHANGE UP (ref 80–100)
MONOCYTES # BLD AUTO: 0.57 K/UL — SIGNIFICANT CHANGE UP (ref 0–0.9)
MONOCYTES # BLD AUTO: 0.57 K/UL — SIGNIFICANT CHANGE UP (ref 0–0.9)
MONOCYTES NFR BLD AUTO: 5.6 % — SIGNIFICANT CHANGE UP (ref 2–14)
MONOCYTES NFR BLD AUTO: 5.6 % — SIGNIFICANT CHANGE UP (ref 2–14)
NEUTROPHILS # BLD AUTO: 5.48 K/UL — SIGNIFICANT CHANGE UP (ref 1.8–7.4)
NEUTROPHILS # BLD AUTO: 5.48 K/UL — SIGNIFICANT CHANGE UP (ref 1.8–7.4)
NEUTROPHILS NFR BLD AUTO: 53.3 % — SIGNIFICANT CHANGE UP (ref 43–77)
NEUTROPHILS NFR BLD AUTO: 53.3 % — SIGNIFICANT CHANGE UP (ref 43–77)
NRBC # BLD: 0 /100 WBCS — SIGNIFICANT CHANGE UP (ref 0–0)
NRBC # BLD: 0 /100 WBCS — SIGNIFICANT CHANGE UP (ref 0–0)
NRBC # FLD: 0 K/UL — SIGNIFICANT CHANGE UP (ref 0–0)
NRBC # FLD: 0 K/UL — SIGNIFICANT CHANGE UP (ref 0–0)
PHOSPHATE SERPL-MCNC: 4.5 MG/DL — SIGNIFICANT CHANGE UP (ref 2.5–4.5)
PHOSPHATE SERPL-MCNC: 4.5 MG/DL — SIGNIFICANT CHANGE UP (ref 2.5–4.5)
PLATELET # BLD AUTO: 451 K/UL — HIGH (ref 150–400)
PLATELET # BLD AUTO: 451 K/UL — HIGH (ref 150–400)
POTASSIUM SERPL-MCNC: 3.8 MMOL/L — SIGNIFICANT CHANGE UP (ref 3.5–5.3)
POTASSIUM SERPL-MCNC: 3.8 MMOL/L — SIGNIFICANT CHANGE UP (ref 3.5–5.3)
POTASSIUM SERPL-SCNC: 3.8 MMOL/L — SIGNIFICANT CHANGE UP (ref 3.5–5.3)
POTASSIUM SERPL-SCNC: 3.8 MMOL/L — SIGNIFICANT CHANGE UP (ref 3.5–5.3)
RBC # BLD: 4.03 M/UL — SIGNIFICANT CHANGE UP (ref 3.8–5.2)
RBC # BLD: 4.03 M/UL — SIGNIFICANT CHANGE UP (ref 3.8–5.2)
RBC # FLD: 13.2 % — SIGNIFICANT CHANGE UP (ref 10.3–14.5)
RBC # FLD: 13.2 % — SIGNIFICANT CHANGE UP (ref 10.3–14.5)
SODIUM SERPL-SCNC: 133 MMOL/L — LOW (ref 135–145)
SODIUM SERPL-SCNC: 133 MMOL/L — LOW (ref 135–145)
WBC # BLD: 10.27 K/UL — SIGNIFICANT CHANGE UP (ref 3.8–10.5)
WBC # BLD: 10.27 K/UL — SIGNIFICANT CHANGE UP (ref 3.8–10.5)
WBC # FLD AUTO: 10.27 K/UL — SIGNIFICANT CHANGE UP (ref 3.8–10.5)
WBC # FLD AUTO: 10.27 K/UL — SIGNIFICANT CHANGE UP (ref 3.8–10.5)

## 2023-12-21 PROCEDURE — 99233 SBSQ HOSP IP/OBS HIGH 50: CPT

## 2023-12-21 RX ORDER — SODIUM CHLORIDE 9 MG/ML
1000 INJECTION INTRAMUSCULAR; INTRAVENOUS; SUBCUTANEOUS
Refills: 0 | Status: DISCONTINUED | OUTPATIENT
Start: 2023-12-21 | End: 2023-12-23

## 2023-12-21 RX ORDER — OXYCODONE HYDROCHLORIDE 5 MG/1
15 TABLET ORAL EVERY 6 HOURS
Refills: 0 | Status: DISCONTINUED | OUTPATIENT
Start: 2023-12-21 | End: 2023-12-28

## 2023-12-21 RX ORDER — DIPHENHYDRAMINE HCL 50 MG
25 CAPSULE ORAL ONCE
Refills: 0 | Status: COMPLETED | OUTPATIENT
Start: 2023-12-21 | End: 2023-12-21

## 2023-12-21 RX ORDER — PANTOPRAZOLE SODIUM 20 MG/1
40 TABLET, DELAYED RELEASE ORAL DAILY
Refills: 0 | Status: DISCONTINUED | OUTPATIENT
Start: 2023-12-21 | End: 2023-12-25

## 2023-12-21 RX ORDER — OXYCODONE HYDROCHLORIDE 5 MG/1
10 TABLET ORAL EVERY 6 HOURS
Refills: 0 | Status: DISCONTINUED | OUTPATIENT
Start: 2023-12-21 | End: 2023-12-21

## 2023-12-21 RX ORDER — MAGNESIUM SULFATE 500 MG/ML
1 VIAL (ML) INJECTION ONCE
Refills: 0 | Status: DISCONTINUED | OUTPATIENT
Start: 2023-12-21 | End: 2023-12-21

## 2023-12-21 RX ORDER — METOCLOPRAMIDE HCL 10 MG
10 TABLET ORAL EVERY 8 HOURS
Refills: 0 | Status: DISCONTINUED | OUTPATIENT
Start: 2023-12-21 | End: 2023-12-23

## 2023-12-21 RX ORDER — KETOROLAC TROMETHAMINE 30 MG/ML
30 SYRINGE (ML) INJECTION EVERY 8 HOURS
Refills: 0 | Status: DISCONTINUED | OUTPATIENT
Start: 2023-12-21 | End: 2023-12-23

## 2023-12-21 RX ADMIN — OXYCODONE HYDROCHLORIDE 10 MILLIGRAM(S): 5 TABLET ORAL at 09:22

## 2023-12-21 RX ADMIN — ACETAZOLAMIDE 500 MILLIGRAM(S): 250 TABLET ORAL at 06:48

## 2023-12-21 RX ADMIN — BUMETANIDE 2 MILLIGRAM(S): 0.25 INJECTION INTRAMUSCULAR; INTRAVENOUS at 09:16

## 2023-12-21 RX ADMIN — INSULIN GLARGINE 45 UNIT(S): 100 INJECTION, SOLUTION SUBCUTANEOUS at 21:47

## 2023-12-21 RX ADMIN — Medication 1: at 09:13

## 2023-12-21 RX ADMIN — HYDROMORPHONE HYDROCHLORIDE 1 MILLIGRAM(S): 2 INJECTION INTRAMUSCULAR; INTRAVENOUS; SUBCUTANEOUS at 06:53

## 2023-12-21 RX ADMIN — CARVEDILOL PHOSPHATE 3.12 MILLIGRAM(S): 80 CAPSULE, EXTENDED RELEASE ORAL at 06:48

## 2023-12-21 RX ADMIN — Medication 3: at 18:26

## 2023-12-21 RX ADMIN — Medication 2 TABLET(S): at 11:59

## 2023-12-21 RX ADMIN — Medication 6 MILLIGRAM(S): at 01:55

## 2023-12-21 RX ADMIN — Medication 1: at 13:28

## 2023-12-21 RX ADMIN — Medication 25 MILLIGRAM(S): at 18:16

## 2023-12-21 RX ADMIN — Medication 30 MILLIGRAM(S): at 18:23

## 2023-12-21 RX ADMIN — LISINOPRIL 10 MILLIGRAM(S): 2.5 TABLET ORAL at 09:16

## 2023-12-21 RX ADMIN — NALOXEGOL OXALATE 25 MILLIGRAM(S): 12.5 TABLET, FILM COATED ORAL at 12:01

## 2023-12-21 RX ADMIN — Medication 5 MILLIGRAM(S): at 01:52

## 2023-12-21 RX ADMIN — Medication 15 UNIT(S): at 13:29

## 2023-12-21 RX ADMIN — ACETAZOLAMIDE 500 MILLIGRAM(S): 250 TABLET ORAL at 18:25

## 2023-12-21 RX ADMIN — Medication 10 MILLIGRAM(S): at 21:49

## 2023-12-21 RX ADMIN — Medication 15 UNIT(S): at 09:14

## 2023-12-21 RX ADMIN — Medication 15 UNIT(S): at 18:26

## 2023-12-21 RX ADMIN — HYDROMORPHONE HYDROCHLORIDE 1 MILLIGRAM(S): 2 INJECTION INTRAMUSCULAR; INTRAVENOUS; SUBCUTANEOUS at 14:56

## 2023-12-21 RX ADMIN — HYDROMORPHONE HYDROCHLORIDE 1 MILLIGRAM(S): 2 INJECTION INTRAMUSCULAR; INTRAVENOUS; SUBCUTANEOUS at 07:30

## 2023-12-21 RX ADMIN — ONDANSETRON 4 MILLIGRAM(S): 8 TABLET, FILM COATED ORAL at 14:29

## 2023-12-21 RX ADMIN — SPIRONOLACTONE 25 MILLIGRAM(S): 25 TABLET, FILM COATED ORAL at 06:48

## 2023-12-21 NOTE — CHART NOTE - NSCHARTNOTEFT_GEN_A_CORE
Impression    52 y/o female with PMH of with obesity, CRPS, intercostal neuralgia, IIH on diamox, who presented to ED for complaints of worsening headache over 3 weeks associated with visual complaints. Case discussed with Headache specialist Dr. Burroughs who does not think current presentation is secondary to IIH as patient is being treated with diamox and LP opening pressure noted to be low at 11. Also not very consistent with a low pressure post LP headache as symptoms do not significantly improve when lying supine. Symptoms may be more consistent with status migrainosus or other primary headache     Recommendations:    [] Can trial migraine cocktail. Please give medications together and can repeat cocktail if need   1st line -  reglan 10 q6h, toradol 30 IV q8h, magnesium sulfate IV 1g, IV benadryl. IVF as needed  2nd line - solumedrol 250mg IV x1 if no contraindications  [] If not improving, can trial DHE (dihydroergotamine) if there are no vascular contraindications   [] Avoid excessive use of NSAIDs or opioids which can lead to rebound headache   [] Follow outpatient with Neurology post-discharge for further management.       Case discussed with Neurology attending Dr. Burroughs. Impression    54 y/o female with PMH of with obesity, CRPS, intercostal neuralgia, IIH on diamox, who presented to ED for complaints of worsening headache over 3 weeks associated with visual complaints. Case discussed with Headache specialist Dr. Burroughs who does not think current presentation is secondary to IIH as patient is being treated with diamox and LP opening pressure noted to be low at 11. Also not very consistent with a low pressure post LP headache as symptoms do not significantly improve when lying supine. Symptoms may be more consistent with status migrainosus or other primary headache     Recommendations:    [] Can trial migraine cocktail. Please give medications together and can repeat cocktail if need   1st line -  reglan 10 q6h, toradol 30 IV q8h, magnesium sulfate IV 1g, IV benadryl. IVF as needed  2nd line - solumedrol 250mg IV x1 if no contraindications  [] If not improving, can trial DHE (dihydroergotamine) if there are no vascular contraindications   [] Avoid excessive use of NSAIDs or opioids which can lead to rebound headache   [] Follow outpatient with Neurology post-discharge for further management.       Case discussed with Neurology attending Dr. Burroughs. Impression    52 y/o female with PMH of with obesity, CRPS, intercostal neuralgia, IIH on diamox, who presented to ED for complaints of worsening headache over 3 weeks associated with visual complaints. Case discussed with Headache specialist Dr. Burroughs who does not think current presentation is secondary to IIH as patient is being treated with diamox and LP opening pressure noted to be low at 11. Also not very consistent with a low pressure post LP headache as symptoms do not significantly improve when lying supine. Symptoms may be more consistent with status migrainosus or other primary headache     Recommendations:    [] Can trial migraine cocktail. Please give medications together and can repeat cocktail if needed   1st line -  reglan 10 q6h, toradol 30 IV q8h, magnesium sulfate IV 1g, IV benadryl. IVF as needed  2nd line - solumedrol 250mg IV x1 if no contraindications  [] If not improving, can trial DHE (dihydroergotamine) if there are no vascular contraindications   [] Avoid excessive use of NSAIDs or opioids which can lead to rebound headache   [] Follow outpatient with Neurology post-discharge for further management.       Case discussed with Neurology attending Dr. Burroughs. Impression    54 y/o female with PMH of with obesity, CRPS, intercostal neuralgia, IIH on diamox, who presented to ED for complaints of worsening headache over 3 weeks associated with visual complaints. Case discussed with Headache specialist Dr. Burroughs who does not think current presentation is secondary to IIH as patient is being treated with diamox and LP opening pressure noted to be low at 11. Also not very consistent with a low pressure post LP headache as symptoms do not significantly improve when lying supine. Symptoms may be more consistent with status migrainosus or other primary headache     Recommendations:    [] Can trial migraine cocktail. Please give medications together and can repeat cocktail if needed   1st line -  reglan 10 q6h, toradol 30 IV q8h, magnesium sulfate IV 1g, IV benadryl. IVF as needed  2nd line - solumedrol 250mg IV x1 if no contraindications  [] If not improving, can trial DHE (dihydroergotamine) if there are no vascular contraindications   [] Avoid excessive use of NSAIDs or opioids which can lead to rebound headache   [] Follow outpatient with Neurology post-discharge for further management.       Case discussed with Neurology attending Dr. Burroughs. Impression    52 y/o female with PMH of with obesity, CRPS, intercostal neuralgia, IIH on diamox, who presented to ED for complaints of worsening headache over 3 weeks associated with visual complaints. Case discussed with Headache specialist Dr. Burroughs who does not think current presentation is secondary to IIH as patient is being treated with diamox and LP opening pressure noted to be low at 11. Also not very consistent with a low pressure post LP headache as symptoms do not significantly improve when lying supine. Symptoms may be more consistent with status migrainosus or other primary headache     Recommendations:    [] Can trial migraine cocktail. Please give medications together and can repeat cocktail if needed   1st line -  reglan 10 q6h, toradol 30 IV q8h, magnesium sulfate IV 1g, IV benadryl. IVF as needed  2nd line - solumedrol 250mg IV x1 if no contraindications  [] If not improving, can trial DHE (dihydroergotamine) if there are no vascular contraindications   [] Avoid excessive use of NSAIDs or opioids which can lead to rebound headache   [] Follow outpatient with Neurology post-discharge for further management.       Case discussed with Neurology attendings Dr. Burroughs and Dr. Espino. Impression    54 y/o female with PMH of with obesity, CRPS, intercostal neuralgia, IIH on diamox, who presented to ED for complaints of worsening headache over 3 weeks associated with visual complaints. Case discussed with Headache specialist Dr. Burroughs who does not think current presentation is secondary to IIH as patient is being treated with diamox and LP opening pressure noted to be low at 11. Also not very consistent with a low pressure post LP headache as symptoms do not significantly improve when lying supine. Symptoms may be more consistent with status migrainosus or other primary headache     Recommendations:    [] Can trial migraine cocktail. Please give medications together and can repeat cocktail if needed   1st line -  reglan 10 q6h, toradol 30 IV q8h, magnesium sulfate IV 1g, IV benadryl. IVF as needed  2nd line - solumedrol 250mg IV x1 if no contraindications  [] If not improving, can trial DHE (dihydroergotamine) if there are no vascular contraindications   [] Avoid excessive use of NSAIDs or opioids which can lead to rebound headache   [] Follow outpatient with Neurology post-discharge for further management.       Case discussed with Neurology attendings Dr. Burroughs and Dr. Espino.  Thank you Impression    52 y/o female with PMH of with obesity, CRPS, intercostal neuralgia, IIH on diamox, who presented to ED for complaints of worsening headache over 3 weeks associated with visual complaints. Case discussed with Headache specialist Dr. Burroughs who does not think current presentation is secondary to IIH as patient is being treated with diamox and LP opening pressure noted to be low at 11. Also not very consistent with a low pressure post LP headache as symptoms do not significantly improve when lying supine. Symptoms may be more consistent with status migrainosus or other primary headache     Recommendations:    [] Can trial migraine cocktail. Please give medications together and can repeat cocktail if needed   1st line -  reglan 10 q6h, toradol 30 IV q8h, magnesium sulfate IV 1g, IV benadryl. IVF as needed  2nd line - solumedrol 250mg IV x1 if no contraindications  [] If not improving, can trial DHE (dihydroergotamine) if there are no vascular contraindications   [] Avoid excessive use of NSAIDs or opioids which can lead to rebound headache   [] Follow outpatient with Neurology post-discharge for further management.       Case discussed with Neurology attendings Dr. Burroughs and Dr. Espino.  Thank you

## 2023-12-21 NOTE — PROGRESS NOTE ADULT - SUBJECTIVE AND OBJECTIVE BOX
[facility] Division of Hospital Medicine  Anderson Yates MD  Pager [phone]      Patient is a 53y old  Female who presents with a chief complaint of Headache, tinnitus (20 Dec 2023 12:03)      SUBJECTIVE / OVERNIGHT EVENTS:    no acute event o/n. cont to c/o severe headache     ADDITIONAL REVIEW OF SYSTEMS:    RESPIRATORY: No cough, wheezing, chills or hemoptysis; No shortness of breath  CARDIOVASCULAR: No chest pain, palpitations, dizziness, or leg swelling  GASTROINTESTINAL: No abdominal or epigastric pain. No nausea, vomiting, or hematemesis; No diarrhea or constipation. No melena or hematochezia.      MEDICATIONS  (STANDING):  acetaZOLAMIDE    Tablet 500 milliGRAM(s) Oral two times a day  buMETAnide 2 milliGRAM(s) Oral daily  carvedilol 3.125 milliGRAM(s) Oral every 12 hours  dextrose 5%. 1000 milliLiter(s) (100 mL/Hr) IV Continuous <Continuous>  dextrose 5%. 1000 milliLiter(s) (50 mL/Hr) IV Continuous <Continuous>  dextrose 50% Injectable 25 Gram(s) IV Push once  dextrose 50% Injectable 12.5 Gram(s) IV Push once  dextrose 50% Injectable 25 Gram(s) IV Push once  glucagon  Injectable 1 milliGRAM(s) IntraMuscular once  insulin glargine Injectable (LANTUS) 45 Unit(s) SubCutaneous at bedtime  insulin lispro (ADMELOG) corrective regimen sliding scale   SubCutaneous at bedtime  insulin lispro (ADMELOG) corrective regimen sliding scale   SubCutaneous three times a day before meals  insulin lispro Injectable (ADMELOG) 15 Unit(s) SubCutaneous three times a day before meals  lisinopril 10 milliGRAM(s) Oral daily  naloxegol 25 milliGRAM(s) Oral daily  spironolactone 25 milliGRAM(s) Oral daily    MEDICATIONS  (PRN):  acetaminophen 325 mG/butalbital 50 mG/caffeine 40 mG 2 Tablet(s) Oral every 8 hours PRN migraine  aluminum hydroxide/magnesium hydroxide/simethicone Suspension 30 milliLiter(s) Oral every 4 hours PRN Dyspepsia  baclofen 5 milliGRAM(s) Oral every 8 hours PRN Muscle Spasm  dextrose Oral Gel 15 Gram(s) Oral once PRN Blood Glucose LESS THAN 70 milliGRAM(s)/deciliter  HYDROmorphone  Injectable 1 milliGRAM(s) IV Push every 8 hours PRN Severe Pain (7 - 10)  melatonin 6 milliGRAM(s) Oral at bedtime PRN Sleep  ondansetron Injectable 4 milliGRAM(s) IV Push every 8 hours PRN Nausea and/or Vomiting  oxyCODONE    IR 15 milliGRAM(s) Oral every 6 hours PRN Moderate Pain (4 - 6)      CAPILLARY BLOOD GLUCOSE      POCT Blood Glucose.: 156 mg/dL (21 Dec 2023 12:31)  POCT Blood Glucose.: 158 mg/dL (21 Dec 2023 08:46)  POCT Blood Glucose.: 135 mg/dL (20 Dec 2023 21:03)  POCT Blood Glucose.: 145 mg/dL (20 Dec 2023 17:36)    I&O's Summary    20 Dec 2023 07:01  -  21 Dec 2023 07:00  --------------------------------------------------------  IN: 900 mL / OUT: 650 mL / NET: 250 mL        PHYSICAL EXAM:  Vital Signs Last 24 Hrs  T(C): 36.7 (21 Dec 2023 10:40), Max: 36.9 (20 Dec 2023 20:05)  T(F): 98 (21 Dec 2023 10:40), Max: 98.5 (20 Dec 2023 20:05)  HR: 56 (21 Dec 2023 10:40) (56 - 69)  BP: 111/64 (21 Dec 2023 10:40) (108/55 - 136/62)  BP(mean): --  RR: 18 (21 Dec 2023 10:40) (17 - 18)  SpO2: 100% (21 Dec 2023 10:40) (97% - 100%)    Parameters below as of 21 Dec 2023 10:40  Patient On (Oxygen Delivery Method): room air        CONSTITUTIONAL: NAD,  EYES: PERRLA; conjunctiva and sclera clear  ENMT: Moist oral mucosa, no pharyngeal injection or exudates;   NECK: Supple, no palpable masses;  RESPIRATORY: Normal respiratory effort; lungs are clear to auscultation bilaterally  CARDIOVASCULAR: Regular rate and rhythm, normal S1 and S2, no murmur/rub/gallop; No lower extremity edema; Peripheral pulses are 2+ bilaterally  ABDOMEN: Nontender to palpation, normoactive bowel sounds, no rebound/guarding;   MUSCLOSKELETAL:   no clubbing or cyanosis of digits; no joint swelling or tenderness to palpation  PSYCH: A+O to person, place, and time; affect appropriate  NEUROLOGY: CN 2-12 are intact and symmetric; no gross sensory deficits;   SKIN: No rashes;     LABS:                        11.9   10.27 )-----------( 451      ( 21 Dec 2023 07:24 )             36.6     12-21    133<L>  |  103  |  27<H>  ----------------------------<  168<H>  3.8   |  19<L>  |  0.93    Ca    9.0      21 Dec 2023 07:24  Phos  4.5     12-21  Mg     2.00     12-21            Urinalysis Basic - ( 21 Dec 2023 07:24 )    Color: x / Appearance: x / SG: x / pH: x  Gluc: 168 mg/dL / Ketone: x  / Bili: x / Urobili: x   Blood: x / Protein: x / Nitrite: x   Leuk Esterase: x / RBC: x / WBC x   Sq Epi: x / Non Sq Epi: x / Bacteria: x          RADIOLOGY & ADDITIONAL TESTS:  Results Reviewed:   Imaging Personally Reviewed:  Electrocardiogram Personally Reviewed:    COORDINATION OF CARE:  Care Discussed with Consultants/Other Providers [Y/N]:  Prior or Outpatient Records Reviewed [Y/N]:   [facility] Division of Hospital Medicine  Anderson Yates MD  Pager [phone]      Patient is a 53y old  Female who presents with a chief complaint of Headache, tinnitus (20 Dec 2023 12:03)      SUBJECTIVE / OVERNIGHT EVENTS:    no acute event o/n. cont to c/o severe headache     ADDITIONAL REVIEW OF SYSTEMS:    RESPIRATORY: No cough, wheezing, chills or hemoptysis; No shortness of breath  CARDIOVASCULAR: No chest pain, palpitations, dizziness, or leg swelling  GASTROINTESTINAL: No abdominal or epigastric pain. No nausea, vomiting, or hematemesis; No diarrhea or constipation. No melena or hematochezia.      MEDICATIONS  (STANDING):  acetaZOLAMIDE    Tablet 500 milliGRAM(s) Oral two times a day  buMETAnide 2 milliGRAM(s) Oral daily  carvedilol 3.125 milliGRAM(s) Oral every 12 hours  dextrose 5%. 1000 milliLiter(s) (100 mL/Hr) IV Continuous <Continuous>  dextrose 5%. 1000 milliLiter(s) (50 mL/Hr) IV Continuous <Continuous>  dextrose 50% Injectable 25 Gram(s) IV Push once  dextrose 50% Injectable 12.5 Gram(s) IV Push once  dextrose 50% Injectable 25 Gram(s) IV Push once  glucagon  Injectable 1 milliGRAM(s) IntraMuscular once  insulin glargine Injectable (LANTUS) 45 Unit(s) SubCutaneous at bedtime  insulin lispro (ADMELOG) corrective regimen sliding scale   SubCutaneous at bedtime  insulin lispro (ADMELOG) corrective regimen sliding scale   SubCutaneous three times a day before meals  insulin lispro Injectable (ADMELOG) 15 Unit(s) SubCutaneous three times a day before meals  lisinopril 10 milliGRAM(s) Oral daily  naloxegol 25 milliGRAM(s) Oral daily  spironolactone 25 milliGRAM(s) Oral daily    MEDICATIONS  (PRN):  acetaminophen 325 mG/butalbital 50 mG/caffeine 40 mG 2 Tablet(s) Oral every 8 hours PRN migraine  aluminum hydroxide/magnesium hydroxide/simethicone Suspension 30 milliLiter(s) Oral every 4 hours PRN Dyspepsia  baclofen 5 milliGRAM(s) Oral every 8 hours PRN Muscle Spasm  dextrose Oral Gel 15 Gram(s) Oral once PRN Blood Glucose LESS THAN 70 milliGRAM(s)/deciliter  HYDROmorphone  Injectable 1 milliGRAM(s) IV Push every 8 hours PRN Severe Pain (7 - 10)  melatonin 6 milliGRAM(s) Oral at bedtime PRN Sleep  ondansetron Injectable 4 milliGRAM(s) IV Push every 8 hours PRN Nausea and/or Vomiting  oxyCODONE    IR 15 milliGRAM(s) Oral every 6 hours PRN Moderate Pain (4 - 6)      CAPILLARY BLOOD GLUCOSE      POCT Blood Glucose.: 156 mg/dL (21 Dec 2023 12:31)  POCT Blood Glucose.: 158 mg/dL (21 Dec 2023 08:46)  POCT Blood Glucose.: 135 mg/dL (20 Dec 2023 21:03)  POCT Blood Glucose.: 145 mg/dL (20 Dec 2023 17:36)    I&O's Summary    20 Dec 2023 07:01  -  21 Dec 2023 07:00  --------------------------------------------------------  IN: 900 mL / OUT: 650 mL / NET: 250 mL        PHYSICAL EXAM:  Vital Signs Last 24 Hrs  T(C): 36.7 (21 Dec 2023 10:40), Max: 36.9 (20 Dec 2023 20:05)  T(F): 98 (21 Dec 2023 10:40), Max: 98.5 (20 Dec 2023 20:05)  HR: 56 (21 Dec 2023 10:40) (56 - 69)  BP: 111/64 (21 Dec 2023 10:40) (108/55 - 136/62)  BP(mean): --  RR: 18 (21 Dec 2023 10:40) (17 - 18)  SpO2: 100% (21 Dec 2023 10:40) (97% - 100%)    Parameters below as of 21 Dec 2023 10:40  Patient On (Oxygen Delivery Method): room air        CONSTITUTIONAL: NAD,  EYES: PERRLA; conjunctiva and sclera clear  ENMT: Moist oral mucosa, no pharyngeal injection or exudates;   NECK: Supple, no palpable masses;  RESPIRATORY: Normal respiratory effort; lungs are clear to auscultation bilaterally  CARDIOVASCULAR: Regular rate and rhythm, normal S1 and S2, no murmur/rub/gallop; No lower extremity edema; Peripheral pulses are 2+ bilaterally  ABDOMEN: Nontender to palpation, normoactive bowel sounds, no rebound/guarding;   MUSCLOSKELETAL:   no clubbing or cyanosis of digits; no joint swelling or tenderness to palpation  PSYCH: A+O to person, place, and time; affect appropriate  NEUROLOGY: CN 2-12 are intact and symmetric; no gross sensory deficits;   SKIN: No rashes;     LABS:                        11.9   10.27 )-----------( 451      ( 21 Dec 2023 07:24 )             36.6     12-21    133<L>  |  103  |  27<H>  ----------------------------<  168<H>  3.8   |  19<L>  |  0.93    Ca    9.0      21 Dec 2023 07:24  Phos  4.5     12-21  Mg     2.00     12-21            Urinalysis Basic - ( 21 Dec 2023 07:24 )    Color: x / Appearance: x / SG: x / pH: x  Gluc: 168 mg/dL / Ketone: x  / Bili: x / Urobili: x   Blood: x / Protein: x / Nitrite: x   Leuk Esterase: x / RBC: x / WBC x   Sq Epi: x / Non Sq Epi: x / Bacteria: x          RADIOLOGY & ADDITIONAL TESTS:  Results Reviewed:   Imaging Personally Reviewed:  Electrocardiogram Personally Reviewed:    COORDINATION OF CARE:  Care Discussed with Consultants/Other Providers [Y/N]:  Prior or Outpatient Records Reviewed [Y/N]:

## 2023-12-21 NOTE — CHART NOTE - NSCHARTNOTEFT_GEN_A_CORE
REF  [id number]    PDI	Current Rx	Drug Type	Rx Written	Rx Dispensed	Drug	Quantity	Days Supply	Prescriber Name		Payment Method	Dispenser  A	N		10/06/2023	10/07/2023	pregabalin 100 mg capsule	           90	30	 Reba Montez		Medicare	[facility]  A	N	O	09/28/2023	10/01/2023	oxycodone hcl (ir) 15 mg tab	30	5	Reba Montez		Medicare	[facility] REF  [id number]    PDI	Current Rx	Drug Type	Rx Written	Rx Dispensed	Drug	Quantity	Days Supply	Prescriber Name		Payment Method	Dispenser  A	N		10/06/2023	10/07/2023	pregabalin 100 mg capsule	           90	30	 Reba Montez		Medicare	[facility]  A	N	O	09/28/2023	10/01/2023	oxycodone hcl (ir) 15 mg tab	30	5	Reba Montez		Medicare	[facility]

## 2023-12-21 NOTE — PROGRESS NOTE ADULT - PROBLEM SELECTOR PLAN 1
Patient reports to have band-like headache, tinnitus, blurry vision, and photophobia, consistent with her previous episode of idiopathic intracranial HTN /pseudotumor cerebri   -CT head and venogram is negative for any acute pathology   -Unable to get MRI/MRV brain, MRI orbits w/wo contrast as pt with neurostimulator which is not MRI compatible.  -ED attempted to perform LP 10X but was not successful, Had LP on 12/11 with IR, opening pressure measured 11 cm water.   -Post LP pt has worse headache- neuro rec blood patch.   -anesthesia rec CTH and IR consult for blood path if imaging shows signs of intracranial hypotension   -Head ct with contrast 12/16: Expanded sella turcica with CSF attenuation due to near-complete empty sella versus underlying arachnoid cyst. Findings may be seen in the setting of intracranial hypertension.  -f/u neurology re: headache management   -c/w diamox and oxy/dilaudid prn for now. added Esgic prn Patient reports to have band-like headache, tinnitus, blurry vision, and photophobia, consistent with her previous episode of idiopathic intracranial HTN /pseudotumor cerebri   -CT head and venogram is negative for any acute pathology   -Unable to get MRI/MRV brain, MRI orbits w/wo contrast as pt with neurostimulator which is not MRI compatible.  -ED attempted to perform LP 10X but was not successful, Had LP on 12/11 with IR, opening pressure measured 11 cm water.   -Post LP pt has worse headache- neuro rec blood patch.   -anesthesia rec CTH and IR consult for blood path if imaging shows signs of intracranial hypotension   -Head ct with contrast 12/16: Expanded sella turcica with CSF attenuation due to near-complete empty sella versus underlying arachnoid cyst. Findings may be seen in the setting of intracranial hypertension.  -case discussed with neurology- will start trial of migraine cocktail- toradol, reglan. also on Esgic prn and oxy  -c/w diamox.

## 2023-12-22 LAB
GLUCOSE BLDC GLUCOMTR-MCNC: 176 MG/DL — HIGH (ref 70–99)
GLUCOSE BLDC GLUCOMTR-MCNC: 176 MG/DL — HIGH (ref 70–99)
GLUCOSE BLDC GLUCOMTR-MCNC: 222 MG/DL — HIGH (ref 70–99)
GLUCOSE BLDC GLUCOMTR-MCNC: 222 MG/DL — HIGH (ref 70–99)
GLUCOSE BLDC GLUCOMTR-MCNC: 254 MG/DL — HIGH (ref 70–99)
GLUCOSE BLDC GLUCOMTR-MCNC: 254 MG/DL — HIGH (ref 70–99)
GLUCOSE BLDC GLUCOMTR-MCNC: 268 MG/DL — HIGH (ref 70–99)
GLUCOSE BLDC GLUCOMTR-MCNC: 268 MG/DL — HIGH (ref 70–99)
GLUCOSE BLDC GLUCOMTR-MCNC: 279 MG/DL — HIGH (ref 70–99)
GLUCOSE BLDC GLUCOMTR-MCNC: 279 MG/DL — HIGH (ref 70–99)

## 2023-12-22 PROCEDURE — 99233 SBSQ HOSP IP/OBS HIGH 50: CPT

## 2023-12-22 RX ORDER — MAGNESIUM SULFATE 500 MG/ML
1 VIAL (ML) INJECTION ONCE
Refills: 0 | Status: COMPLETED | OUTPATIENT
Start: 2023-12-22 | End: 2023-12-22

## 2023-12-22 RX ORDER — INSULIN GLARGINE 100 [IU]/ML
48 INJECTION, SOLUTION SUBCUTANEOUS AT BEDTIME
Refills: 0 | Status: DISCONTINUED | OUTPATIENT
Start: 2023-12-22 | End: 2023-12-23

## 2023-12-22 RX ORDER — DIPHENHYDRAMINE HCL 50 MG
25 CAPSULE ORAL ONCE
Refills: 0 | Status: COMPLETED | OUTPATIENT
Start: 2023-12-22 | End: 2023-12-22

## 2023-12-22 RX ORDER — INSULIN LISPRO 100/ML
16 VIAL (ML) SUBCUTANEOUS
Refills: 0 | Status: DISCONTINUED | OUTPATIENT
Start: 2023-12-22 | End: 2023-12-23

## 2023-12-22 RX ADMIN — Medication 30 MILLIGRAM(S): at 22:06

## 2023-12-22 RX ADMIN — Medication 30 MILLIGRAM(S): at 07:18

## 2023-12-22 RX ADMIN — ACETAZOLAMIDE 500 MILLIGRAM(S): 250 TABLET ORAL at 18:29

## 2023-12-22 RX ADMIN — INSULIN GLARGINE 48 UNIT(S): 100 INJECTION, SOLUTION SUBCUTANEOUS at 22:43

## 2023-12-22 RX ADMIN — OXYCODONE HYDROCHLORIDE 15 MILLIGRAM(S): 5 TABLET ORAL at 18:29

## 2023-12-22 RX ADMIN — NALOXEGOL OXALATE 25 MILLIGRAM(S): 12.5 TABLET, FILM COATED ORAL at 12:13

## 2023-12-22 RX ADMIN — Medication 15 UNIT(S): at 13:40

## 2023-12-22 RX ADMIN — OXYCODONE HYDROCHLORIDE 15 MILLIGRAM(S): 5 TABLET ORAL at 10:59

## 2023-12-22 RX ADMIN — Medication 16 UNIT(S): at 18:32

## 2023-12-22 RX ADMIN — Medication 25 MILLIGRAM(S): at 22:06

## 2023-12-22 RX ADMIN — Medication 1: at 09:22

## 2023-12-22 RX ADMIN — Medication 30 MILLIGRAM(S): at 23:06

## 2023-12-22 RX ADMIN — Medication 100 GRAM(S): at 15:55

## 2023-12-22 RX ADMIN — SPIRONOLACTONE 25 MILLIGRAM(S): 25 TABLET, FILM COATED ORAL at 07:19

## 2023-12-22 RX ADMIN — CARVEDILOL PHOSPHATE 3.12 MILLIGRAM(S): 80 CAPSULE, EXTENDED RELEASE ORAL at 07:18

## 2023-12-22 RX ADMIN — OXYCODONE HYDROCHLORIDE 15 MILLIGRAM(S): 5 TABLET ORAL at 19:00

## 2023-12-22 RX ADMIN — CARVEDILOL PHOSPHATE 3.12 MILLIGRAM(S): 80 CAPSULE, EXTENDED RELEASE ORAL at 18:30

## 2023-12-22 RX ADMIN — Medication 30 MILLIGRAM(S): at 13:45

## 2023-12-22 RX ADMIN — Medication 10 MILLIGRAM(S): at 13:41

## 2023-12-22 RX ADMIN — BUMETANIDE 2 MILLIGRAM(S): 0.25 INJECTION INTRAMUSCULAR; INTRAVENOUS at 09:08

## 2023-12-22 RX ADMIN — Medication 3: at 13:39

## 2023-12-22 RX ADMIN — Medication 2 TABLET(S): at 09:01

## 2023-12-22 RX ADMIN — Medication 15 UNIT(S): at 09:23

## 2023-12-22 RX ADMIN — OXYCODONE HYDROCHLORIDE 15 MILLIGRAM(S): 5 TABLET ORAL at 11:29

## 2023-12-22 RX ADMIN — PANTOPRAZOLE SODIUM 40 MILLIGRAM(S): 20 TABLET, DELAYED RELEASE ORAL at 12:14

## 2023-12-22 RX ADMIN — Medication 3: at 18:31

## 2023-12-22 RX ADMIN — ACETAZOLAMIDE 500 MILLIGRAM(S): 250 TABLET ORAL at 07:18

## 2023-12-22 RX ADMIN — Medication 10 MILLIGRAM(S): at 07:18

## 2023-12-22 RX ADMIN — Medication 10 MILLIGRAM(S): at 22:06

## 2023-12-22 RX ADMIN — LISINOPRIL 10 MILLIGRAM(S): 2.5 TABLET ORAL at 09:08

## 2023-12-22 NOTE — PROGRESS NOTE ADULT - SUBJECTIVE AND OBJECTIVE BOX
[facility] Division of [facility]  Anderson Yates MD  Pager [phone]      Patient is a 53y old  Female who presents with a chief complaint of Headache, tinnitus (21 Dec 2023 15:47)      SUBJECTIVE / OVERNIGHT EVENTS:    no acute event o/n. reports good response to toradol /reglan combination for headache. no new complaints     ADDITIONAL REVIEW OF SYSTEMS:    RESPIRATORY: No cough, wheezing, chills or hemoptysis; No shortness of breath  CARDIOVASCULAR: No chest pain, palpitations, dizziness, or leg swelling  GASTROINTESTINAL: No abdominal or epigastric pain. No nausea, vomiting, or hematemesis; No diarrhea or constipation. No melena or hematochezia.      MEDICATIONS  (STANDING):  acetaZOLAMIDE    Tablet 500 milliGRAM(s) Oral two times a day  buMETAnide 2 milliGRAM(s) Oral daily  carvedilol 3.125 milliGRAM(s) Oral every 12 hours  dextrose 5%. 1000 milliLiter(s) (100 mL/Hr) IV Continuous <Continuous>  dextrose 5%. 1000 milliLiter(s) (50 mL/Hr) IV Continuous <Continuous>  dextrose 50% Injectable 25 Gram(s) IV Push once  dextrose 50% Injectable 12.5 Gram(s) IV Push once  dextrose 50% Injectable 25 Gram(s) IV Push once  glucagon  Injectable 1 milliGRAM(s) IntraMuscular once  insulin glargine Injectable (LANTUS) 45 Unit(s) SubCutaneous at bedtime  insulin lispro (ADMELOG) corrective regimen sliding scale   SubCutaneous at bedtime  insulin lispro (ADMELOG) corrective regimen sliding scale   SubCutaneous three times a day before meals  insulin lispro Injectable (ADMELOG) 15 Unit(s) SubCutaneous three times a day before meals  ketorolac   Injectable 30 milliGRAM(s) IV Push every 8 hours  lisinopril 10 milliGRAM(s) Oral daily  metoclopramide Injectable 10 milliGRAM(s) IV Push every 8 hours  naloxegol 25 milliGRAM(s) Oral daily  pantoprazole   Suspension 40 milliGRAM(s) Oral daily  sodium chloride 0.9%. 1000 milliLiter(s) (75 mL/Hr) IV Continuous <Continuous>  spironolactone 25 milliGRAM(s) Oral daily    MEDICATIONS  (PRN):  acetaminophen 325 mG/butalbital 50 mG/caffeine 40 mG 2 Tablet(s) Oral every 8 hours PRN migraine  aluminum hydroxide/magnesium hydroxide/simethicone Suspension 30 milliLiter(s) Oral every 4 hours PRN Dyspepsia  baclofen 5 milliGRAM(s) Oral every 8 hours PRN Muscle Spasm  dextrose Oral Gel 15 Gram(s) Oral once PRN Blood Glucose LESS THAN 70 milliGRAM(s)/deciliter  melatonin 6 milliGRAM(s) Oral at bedtime PRN Sleep  ondansetron Injectable 4 milliGRAM(s) IV Push every 8 hours PRN Nausea and/or Vomiting  oxyCODONE    IR 15 milliGRAM(s) Oral every 6 hours PRN Moderate Pain (4 - 6)      CAPILLARY BLOOD GLUCOSE      POCT Blood Glucose.: 279 mg/dL (22 Dec 2023 12:28)  POCT Blood Glucose.: 176 mg/dL (22 Dec 2023 09:17)  POCT Blood Glucose.: 183 mg/dL (21 Dec 2023 21:34)  POCT Blood Glucose.: 255 mg/dL (21 Dec 2023 17:40)    I&O's Summary    22 Dec 2023 07:01  -  22 Dec 2023 12:37  --------------------------------------------------------  IN: 500 mL / OUT: 500 mL / NET: 0 mL        PHYSICAL EXAM:  Vital Signs Last 24 Hrs  T(C): 36.7 (22 Dec 2023 11:09), Max: 37.1 (22 Dec 2023 07:08)  T(F): 98.1 (22 Dec 2023 11:09), Max: 98.8 (22 Dec 2023 07:08)  HR: 72 (22 Dec 2023 11:09) (64 - 72)  BP: 136/65 (22 Dec 2023 11:09) (96/53 - 139/73)  BP(mean): --  RR: 18 (22 Dec 2023 11:09) (17 - 18)  SpO2: 100% (22 Dec 2023 11:09) (99% - 100%)    Parameters below as of 22 Dec 2023 09:10  Patient On (Oxygen Delivery Method): room air        CONSTITUTIONAL: NAD,  EYES: PERRLA; conjunctiva and sclera clear  ENMT: Moist oral mucosa, no pharyngeal injection or exudates;   NECK: Supple, no palpable masses;  RESPIRATORY: Normal respiratory effort; lungs are clear to auscultation bilaterally  CARDIOVASCULAR: Regular rate and rhythm, normal S1 and S2, no murmur/rub/gallop; No lower extremity edema; Peripheral pulses are 2+ bilaterally  ABDOMEN: Nontender to palpation, normoactive bowel sounds, no rebound/guarding;   MUSCLOSKELETAL:   no clubbing or cyanosis of digits; no joint swelling or tenderness to palpation  PSYCH: A+O to person, place, and time; affect appropriate  NEUROLOGY: CN 2-12 are intact and symmetric; no gross sensory deficits;   SKIN: No rashes;     LABS:                        11.9   10.27 )-----------( 451      ( 21 Dec 2023 07:24 )             36.6     12-21    133<L>  |  103  |  27<H>  ----------------------------<  168<H>  3.8   |  19<L>  |  0.93    Ca    9.0      21 Dec 2023 07:24  Phos  4.5     12-21  Mg     2.00     12-21            Urinalysis Basic - ( 21 Dec 2023 07:24 )    Color: x / Appearance: x / SG: x / pH: x  Gluc: 168 mg/dL / Ketone: x  / Bili: x / Urobili: x   Blood: x / Protein: x / Nitrite: x   Leuk Esterase: x / RBC: x / WBC x   Sq Epi: x / Non Sq Epi: x / Bacteria: x          RADIOLOGY & ADDITIONAL TESTS:  Results Reviewed:   Imaging Personally Reviewed:  Electrocardiogram Personally Reviewed:    COORDINATION OF CARE:  Care Discussed with Consultants/Other Providers [Y/N]:  Prior or Outpatient Records Reviewed [Y/N]:   [facility] Division of [facility] Medicine  Anderson Yates MD  Pager [phone]      Patient is a 53y old  Female who presents with a chief complaint of Headache, tinnitus (21 Dec 2023 15:47)      SUBJECTIVE / OVERNIGHT EVENTS:    no acute event o/n. reports good response to toradol /reglan combination for headache. no new complaints     ADDITIONAL REVIEW OF SYSTEMS:    RESPIRATORY: No cough, wheezing, chills or hemoptysis; No shortness of breath  CARDIOVASCULAR: No chest pain, palpitations, dizziness, or leg swelling  GASTROINTESTINAL: No abdominal or epigastric pain. No nausea, vomiting, or hematemesis; No diarrhea or constipation. No melena or hematochezia.      MEDICATIONS  (STANDING):  acetaZOLAMIDE    Tablet 500 milliGRAM(s) Oral two times a day  buMETAnide 2 milliGRAM(s) Oral daily  carvedilol 3.125 milliGRAM(s) Oral every 12 hours  dextrose 5%. 1000 milliLiter(s) (100 mL/Hr) IV Continuous <Continuous>  dextrose 5%. 1000 milliLiter(s) (50 mL/Hr) IV Continuous <Continuous>  dextrose 50% Injectable 25 Gram(s) IV Push once  dextrose 50% Injectable 12.5 Gram(s) IV Push once  dextrose 50% Injectable 25 Gram(s) IV Push once  glucagon  Injectable 1 milliGRAM(s) IntraMuscular once  insulin glargine Injectable (LANTUS) 45 Unit(s) SubCutaneous at bedtime  insulin lispro (ADMELOG) corrective regimen sliding scale   SubCutaneous at bedtime  insulin lispro (ADMELOG) corrective regimen sliding scale   SubCutaneous three times a day before meals  insulin lispro Injectable (ADMELOG) 15 Unit(s) SubCutaneous three times a day before meals  ketorolac   Injectable 30 milliGRAM(s) IV Push every 8 hours  lisinopril 10 milliGRAM(s) Oral daily  metoclopramide Injectable 10 milliGRAM(s) IV Push every 8 hours  naloxegol 25 milliGRAM(s) Oral daily  pantoprazole   Suspension 40 milliGRAM(s) Oral daily  sodium chloride 0.9%. 1000 milliLiter(s) (75 mL/Hr) IV Continuous <Continuous>  spironolactone 25 milliGRAM(s) Oral daily    MEDICATIONS  (PRN):  acetaminophen 325 mG/butalbital 50 mG/caffeine 40 mG 2 Tablet(s) Oral every 8 hours PRN migraine  aluminum hydroxide/magnesium hydroxide/simethicone Suspension 30 milliLiter(s) Oral every 4 hours PRN Dyspepsia  baclofen 5 milliGRAM(s) Oral every 8 hours PRN Muscle Spasm  dextrose Oral Gel 15 Gram(s) Oral once PRN Blood Glucose LESS THAN 70 milliGRAM(s)/deciliter  melatonin 6 milliGRAM(s) Oral at bedtime PRN Sleep  ondansetron Injectable 4 milliGRAM(s) IV Push every 8 hours PRN Nausea and/or Vomiting  oxyCODONE    IR 15 milliGRAM(s) Oral every 6 hours PRN Moderate Pain (4 - 6)      CAPILLARY BLOOD GLUCOSE      POCT Blood Glucose.: 279 mg/dL (22 Dec 2023 12:28)  POCT Blood Glucose.: 176 mg/dL (22 Dec 2023 09:17)  POCT Blood Glucose.: 183 mg/dL (21 Dec 2023 21:34)  POCT Blood Glucose.: 255 mg/dL (21 Dec 2023 17:40)    I&O's Summary    22 Dec 2023 07:01  -  22 Dec 2023 12:37  --------------------------------------------------------  IN: 500 mL / OUT: 500 mL / NET: 0 mL        PHYSICAL EXAM:  Vital Signs Last 24 Hrs  T(C): 36.7 (22 Dec 2023 11:09), Max: 37.1 (22 Dec 2023 07:08)  T(F): 98.1 (22 Dec 2023 11:09), Max: 98.8 (22 Dec 2023 07:08)  HR: 72 (22 Dec 2023 11:09) (64 - 72)  BP: 136/65 (22 Dec 2023 11:09) (96/53 - 139/73)  BP(mean): --  RR: 18 (22 Dec 2023 11:09) (17 - 18)  SpO2: 100% (22 Dec 2023 11:09) (99% - 100%)    Parameters below as of 22 Dec 2023 09:10  Patient On (Oxygen Delivery Method): room air        CONSTITUTIONAL: NAD,  EYES: PERRLA; conjunctiva and sclera clear  ENMT: Moist oral mucosa, no pharyngeal injection or exudates;   NECK: Supple, no palpable masses;  RESPIRATORY: Normal respiratory effort; lungs are clear to auscultation bilaterally  CARDIOVASCULAR: Regular rate and rhythm, normal S1 and S2, no murmur/rub/gallop; No lower extremity edema; Peripheral pulses are 2+ bilaterally  ABDOMEN: Nontender to palpation, normoactive bowel sounds, no rebound/guarding;   MUSCLOSKELETAL:   no clubbing or cyanosis of digits; no joint swelling or tenderness to palpation  PSYCH: A+O to person, place, and time; affect appropriate  NEUROLOGY: CN 2-12 are intact and symmetric; no gross sensory deficits;   SKIN: No rashes;     LABS:                        11.9   10.27 )-----------( 451      ( 21 Dec 2023 07:24 )             36.6     12-21    133<L>  |  103  |  27<H>  ----------------------------<  168<H>  3.8   |  19<L>  |  0.93    Ca    9.0      21 Dec 2023 07:24  Phos  4.5     12-21  Mg     2.00     12-21            Urinalysis Basic - ( 21 Dec 2023 07:24 )    Color: x / Appearance: x / SG: x / pH: x  Gluc: 168 mg/dL / Ketone: x  / Bili: x / Urobili: x   Blood: x / Protein: x / Nitrite: x   Leuk Esterase: x / RBC: x / WBC x   Sq Epi: x / Non Sq Epi: x / Bacteria: x          RADIOLOGY & ADDITIONAL TESTS:  Results Reviewed:   Imaging Personally Reviewed:  Electrocardiogram Personally Reviewed:    COORDINATION OF CARE:  Care Discussed with Consultants/Other Providers [Y/N]:  Prior or Outpatient Records Reviewed [Y/N]:

## 2023-12-22 NOTE — PROGRESS NOTE ADULT - PROBLEM SELECTOR PLAN 1
Patient reports to have band-like headache, tinnitus, blurry vision, and photophobia, consistent with her previous episode of idiopathic intracranial HTN /pseudotumor cerebri   -CT head and venogram is negative for any acute pathology   -Unable to get MRI/MRV brain, MRI orbits w/wo contrast as pt with neurostimulator which is not MRI compatible.  -ED attempted to perform LP 10X but was not successful, Had LP on 12/11 with IR, opening pressure measured 11 cm water.   -Post LP pt has worse headache- neuro rec blood patch.   -anesthesia rec CTH and IR consult for blood path if imaging shows signs of intracranial hypotension   -Head ct with contrast 12/16: Expanded sella turcica with CSF attenuation due to near-complete empty sella versus underlying arachnoid cyst. Findings may be seen in the setting of intracranial hypertension.  -case discussed with neurology- started on trial of migraine cocktail- toradol, reglan. also on Esgic prn and oxy  -c/w diamox.

## 2023-12-22 NOTE — PROGRESS NOTE ADULT - ASSESSMENT
52 yo F with Pmhx of CHF, HTN, HLD, DM, Idiopathic intracranial hypertension (diagnosed 2020), chronic back pain (intercostal neuralgia/complex regional pain syndrome) s/p spinal stimulator presents to the ED with worsening headache thought to be  due to Pseudotumor cerebri. s/p  LP by IR found to have normal opening pressure.  now with worse headache after LP

## 2023-12-23 ENCOUNTER — TRANSCRIPTION ENCOUNTER (OUTPATIENT)
Age: 53
End: 2023-12-23

## 2023-12-23 DIAGNOSIS — E78.5 HYPERLIPIDEMIA, UNSPECIFIED: ICD-10-CM

## 2023-12-23 LAB
24R-OH-CALCIDIOL SERPL-MCNC: 12.5 NG/ML — LOW (ref 30–80)
24R-OH-CALCIDIOL SERPL-MCNC: 12.5 NG/ML — LOW (ref 30–80)
ANION GAP SERPL CALC-SCNC: 11 MMOL/L — SIGNIFICANT CHANGE UP (ref 7–14)
ANION GAP SERPL CALC-SCNC: 11 MMOL/L — SIGNIFICANT CHANGE UP (ref 7–14)
BASOPHILS # BLD AUTO: 0.03 K/UL — SIGNIFICANT CHANGE UP (ref 0–0.2)
BASOPHILS # BLD AUTO: 0.03 K/UL — SIGNIFICANT CHANGE UP (ref 0–0.2)
BASOPHILS NFR BLD AUTO: 0.3 % — SIGNIFICANT CHANGE UP (ref 0–2)
BASOPHILS NFR BLD AUTO: 0.3 % — SIGNIFICANT CHANGE UP (ref 0–2)
BUN SERPL-MCNC: 32 MG/DL — HIGH (ref 7–23)
BUN SERPL-MCNC: 32 MG/DL — HIGH (ref 7–23)
CALCIUM SERPL-MCNC: 8.3 MG/DL — LOW (ref 8.4–10.5)
CALCIUM SERPL-MCNC: 8.3 MG/DL — LOW (ref 8.4–10.5)
CHLORIDE SERPL-SCNC: 106 MMOL/L — SIGNIFICANT CHANGE UP (ref 98–107)
CHLORIDE SERPL-SCNC: 106 MMOL/L — SIGNIFICANT CHANGE UP (ref 98–107)
CHOLEST SERPL-MCNC: 150 MG/DL — SIGNIFICANT CHANGE UP
CHOLEST SERPL-MCNC: 150 MG/DL — SIGNIFICANT CHANGE UP
CO2 SERPL-SCNC: 18 MMOL/L — LOW (ref 22–31)
CO2 SERPL-SCNC: 18 MMOL/L — LOW (ref 22–31)
CREAT SERPL-MCNC: 1.06 MG/DL — SIGNIFICANT CHANGE UP (ref 0.5–1.3)
CREAT SERPL-MCNC: 1.06 MG/DL — SIGNIFICANT CHANGE UP (ref 0.5–1.3)
EGFR: 63 ML/MIN/1.73M2 — SIGNIFICANT CHANGE UP
EGFR: 63 ML/MIN/1.73M2 — SIGNIFICANT CHANGE UP
EOSINOPHIL # BLD AUTO: 0.25 K/UL — SIGNIFICANT CHANGE UP (ref 0–0.5)
EOSINOPHIL # BLD AUTO: 0.25 K/UL — SIGNIFICANT CHANGE UP (ref 0–0.5)
EOSINOPHIL NFR BLD AUTO: 2.4 % — SIGNIFICANT CHANGE UP (ref 0–6)
EOSINOPHIL NFR BLD AUTO: 2.4 % — SIGNIFICANT CHANGE UP (ref 0–6)
GLUCOSE BLDC GLUCOMTR-MCNC: 143 MG/DL — HIGH (ref 70–99)
GLUCOSE BLDC GLUCOMTR-MCNC: 143 MG/DL — HIGH (ref 70–99)
GLUCOSE BLDC GLUCOMTR-MCNC: 232 MG/DL — HIGH (ref 70–99)
GLUCOSE BLDC GLUCOMTR-MCNC: 232 MG/DL — HIGH (ref 70–99)
GLUCOSE BLDC GLUCOMTR-MCNC: 274 MG/DL — HIGH (ref 70–99)
GLUCOSE BLDC GLUCOMTR-MCNC: 274 MG/DL — HIGH (ref 70–99)
GLUCOSE BLDC GLUCOMTR-MCNC: 318 MG/DL — HIGH (ref 70–99)
GLUCOSE BLDC GLUCOMTR-MCNC: 318 MG/DL — HIGH (ref 70–99)
GLUCOSE SERPL-MCNC: 235 MG/DL — HIGH (ref 70–99)
GLUCOSE SERPL-MCNC: 235 MG/DL — HIGH (ref 70–99)
HCT VFR BLD CALC: 32 % — LOW (ref 34.5–45)
HCT VFR BLD CALC: 32 % — LOW (ref 34.5–45)
HDLC SERPL-MCNC: 38 MG/DL — LOW
HDLC SERPL-MCNC: 38 MG/DL — LOW
HGB BLD-MCNC: 10.5 G/DL — LOW (ref 11.5–15.5)
HGB BLD-MCNC: 10.5 G/DL — LOW (ref 11.5–15.5)
IANC: 5.38 K/UL — SIGNIFICANT CHANGE UP (ref 1.8–7.4)
IANC: 5.38 K/UL — SIGNIFICANT CHANGE UP (ref 1.8–7.4)
IMM GRANULOCYTES NFR BLD AUTO: 0.4 % — SIGNIFICANT CHANGE UP (ref 0–0.9)
IMM GRANULOCYTES NFR BLD AUTO: 0.4 % — SIGNIFICANT CHANGE UP (ref 0–0.9)
LIPID PNL WITH DIRECT LDL SERPL: 85 MG/DL — SIGNIFICANT CHANGE UP
LIPID PNL WITH DIRECT LDL SERPL: 85 MG/DL — SIGNIFICANT CHANGE UP
LYMPHOCYTES # BLD AUTO: 4.28 K/UL — HIGH (ref 1–3.3)
LYMPHOCYTES # BLD AUTO: 4.28 K/UL — HIGH (ref 1–3.3)
LYMPHOCYTES # BLD AUTO: 40.4 % — SIGNIFICANT CHANGE UP (ref 13–44)
LYMPHOCYTES # BLD AUTO: 40.4 % — SIGNIFICANT CHANGE UP (ref 13–44)
MAGNESIUM SERPL-MCNC: 2 MG/DL — SIGNIFICANT CHANGE UP (ref 1.6–2.6)
MAGNESIUM SERPL-MCNC: 2 MG/DL — SIGNIFICANT CHANGE UP (ref 1.6–2.6)
MCHC RBC-ENTMCNC: 29.7 PG — SIGNIFICANT CHANGE UP (ref 27–34)
MCHC RBC-ENTMCNC: 29.7 PG — SIGNIFICANT CHANGE UP (ref 27–34)
MCHC RBC-ENTMCNC: 32.8 GM/DL — SIGNIFICANT CHANGE UP (ref 32–36)
MCHC RBC-ENTMCNC: 32.8 GM/DL — SIGNIFICANT CHANGE UP (ref 32–36)
MCV RBC AUTO: 90.4 FL — SIGNIFICANT CHANGE UP (ref 80–100)
MCV RBC AUTO: 90.4 FL — SIGNIFICANT CHANGE UP (ref 80–100)
MONOCYTES # BLD AUTO: 0.62 K/UL — SIGNIFICANT CHANGE UP (ref 0–0.9)
MONOCYTES # BLD AUTO: 0.62 K/UL — SIGNIFICANT CHANGE UP (ref 0–0.9)
MONOCYTES NFR BLD AUTO: 5.8 % — SIGNIFICANT CHANGE UP (ref 2–14)
MONOCYTES NFR BLD AUTO: 5.8 % — SIGNIFICANT CHANGE UP (ref 2–14)
NEUTROPHILS # BLD AUTO: 5.38 K/UL — SIGNIFICANT CHANGE UP (ref 1.8–7.4)
NEUTROPHILS # BLD AUTO: 5.38 K/UL — SIGNIFICANT CHANGE UP (ref 1.8–7.4)
NEUTROPHILS NFR BLD AUTO: 50.7 % — SIGNIFICANT CHANGE UP (ref 43–77)
NEUTROPHILS NFR BLD AUTO: 50.7 % — SIGNIFICANT CHANGE UP (ref 43–77)
NON HDL CHOLESTEROL: 112 MG/DL — SIGNIFICANT CHANGE UP
NON HDL CHOLESTEROL: 112 MG/DL — SIGNIFICANT CHANGE UP
NRBC # BLD: 0 /100 WBCS — SIGNIFICANT CHANGE UP (ref 0–0)
NRBC # BLD: 0 /100 WBCS — SIGNIFICANT CHANGE UP (ref 0–0)
NRBC # FLD: 0 K/UL — SIGNIFICANT CHANGE UP (ref 0–0)
NRBC # FLD: 0 K/UL — SIGNIFICANT CHANGE UP (ref 0–0)
PHOSPHATE SERPL-MCNC: 3.9 MG/DL — SIGNIFICANT CHANGE UP (ref 2.5–4.5)
PHOSPHATE SERPL-MCNC: 3.9 MG/DL — SIGNIFICANT CHANGE UP (ref 2.5–4.5)
PLATELET # BLD AUTO: 423 K/UL — HIGH (ref 150–400)
PLATELET # BLD AUTO: 423 K/UL — HIGH (ref 150–400)
POTASSIUM SERPL-MCNC: 3.8 MMOL/L — SIGNIFICANT CHANGE UP (ref 3.5–5.3)
POTASSIUM SERPL-MCNC: 3.8 MMOL/L — SIGNIFICANT CHANGE UP (ref 3.5–5.3)
POTASSIUM SERPL-SCNC: 3.8 MMOL/L — SIGNIFICANT CHANGE UP (ref 3.5–5.3)
POTASSIUM SERPL-SCNC: 3.8 MMOL/L — SIGNIFICANT CHANGE UP (ref 3.5–5.3)
RBC # BLD: 3.54 M/UL — LOW (ref 3.8–5.2)
RBC # BLD: 3.54 M/UL — LOW (ref 3.8–5.2)
RBC # FLD: 13.1 % — SIGNIFICANT CHANGE UP (ref 10.3–14.5)
RBC # FLD: 13.1 % — SIGNIFICANT CHANGE UP (ref 10.3–14.5)
SODIUM SERPL-SCNC: 135 MMOL/L — SIGNIFICANT CHANGE UP (ref 135–145)
SODIUM SERPL-SCNC: 135 MMOL/L — SIGNIFICANT CHANGE UP (ref 135–145)
T4 FREE SERPL-MCNC: 1.3 NG/DL — SIGNIFICANT CHANGE UP (ref 0.9–1.8)
T4 FREE SERPL-MCNC: 1.3 NG/DL — SIGNIFICANT CHANGE UP (ref 0.9–1.8)
TRIGL SERPL-MCNC: 135 MG/DL — SIGNIFICANT CHANGE UP
TRIGL SERPL-MCNC: 135 MG/DL — SIGNIFICANT CHANGE UP
TSH SERPL-MCNC: 4.45 UIU/ML — HIGH (ref 0.27–4.2)
TSH SERPL-MCNC: 4.45 UIU/ML — HIGH (ref 0.27–4.2)
WBC # BLD: 10.6 K/UL — HIGH (ref 3.8–10.5)
WBC # BLD: 10.6 K/UL — HIGH (ref 3.8–10.5)
WBC # FLD AUTO: 10.6 K/UL — HIGH (ref 3.8–10.5)
WBC # FLD AUTO: 10.6 K/UL — HIGH (ref 3.8–10.5)

## 2023-12-23 PROCEDURE — 99223 1ST HOSP IP/OBS HIGH 75: CPT

## 2023-12-23 PROCEDURE — 99232 SBSQ HOSP IP/OBS MODERATE 35: CPT

## 2023-12-23 RX ORDER — DIPHENHYDRAMINE HCL 50 MG
25 CAPSULE ORAL ONCE
Refills: 0 | Status: COMPLETED | OUTPATIENT
Start: 2023-12-23 | End: 2023-12-23

## 2023-12-23 RX ORDER — ACETAZOLAMIDE 250 MG/1
2 TABLET ORAL
Qty: 120 | Refills: 0
Start: 2023-12-23 | End: 2024-01-21

## 2023-12-23 RX ORDER — SODIUM CHLORIDE 9 MG/ML
1000 INJECTION INTRAMUSCULAR; INTRAVENOUS; SUBCUTANEOUS
Refills: 0 | Status: DISCONTINUED | OUTPATIENT
Start: 2023-12-23 | End: 2023-12-24

## 2023-12-23 RX ORDER — INSULIN GLARGINE 100 [IU]/ML
48 INJECTION, SOLUTION SUBCUTANEOUS AT BEDTIME
Refills: 0 | Status: DISCONTINUED | OUTPATIENT
Start: 2023-12-23 | End: 2023-12-23

## 2023-12-23 RX ORDER — INSULIN DEGLUDEC 100 U/ML
45 INJECTION, SOLUTION SUBCUTANEOUS
Refills: 0 | DISCHARGE

## 2023-12-23 RX ORDER — INSULIN ASPART 100 [IU]/ML
18 INJECTION, SOLUTION SUBCUTANEOUS
Qty: 1 | Refills: 0
Start: 2023-12-23 | End: 2024-01-21

## 2023-12-23 RX ORDER — INSULIN DEGLUDEC 100 U/ML
50 INJECTION, SOLUTION SUBCUTANEOUS
Qty: 1 | Refills: 0
Start: 2023-12-23 | End: 2024-01-21

## 2023-12-23 RX ORDER — INSULIN GLARGINE 100 [IU]/ML
50 INJECTION, SOLUTION SUBCUTANEOUS AT BEDTIME
Refills: 0 | Status: DISCONTINUED | OUTPATIENT
Start: 2023-12-23 | End: 2023-12-25

## 2023-12-23 RX ORDER — OXYCODONE HYDROCHLORIDE 5 MG/1
1 TABLET ORAL
Qty: 6 | Refills: 0
Start: 2023-12-23 | End: 2023-12-24

## 2023-12-23 RX ORDER — ERGOCALCIFEROL 1.25 MG/1
1 CAPSULE ORAL
Qty: 4 | Refills: 0
Start: 2023-12-23 | End: 2024-01-19

## 2023-12-23 RX ORDER — KETOROLAC TROMETHAMINE 30 MG/ML
30 SYRINGE (ML) INJECTION EVERY 8 HOURS
Refills: 0 | Status: DISCONTINUED | OUTPATIENT
Start: 2023-12-23 | End: 2023-12-25

## 2023-12-23 RX ORDER — LANOLIN ALCOHOL/MO/W.PET/CERES
2 CREAM (GRAM) TOPICAL
Qty: 0 | Refills: 0 | DISCHARGE
Start: 2023-12-23

## 2023-12-23 RX ORDER — INSULIN LISPRO 100/ML
18 VIAL (ML) SUBCUTANEOUS
Refills: 0 | Status: DISCONTINUED | OUTPATIENT
Start: 2023-12-23 | End: 2023-12-24

## 2023-12-23 RX ORDER — OXYCODONE HYDROCHLORIDE 5 MG/1
1 TABLET ORAL
Refills: 0 | DISCHARGE

## 2023-12-23 RX ORDER — INSULIN GLARGINE 100 [IU]/ML
50 INJECTION, SOLUTION SUBCUTANEOUS AT BEDTIME
Refills: 0 | Status: DISCONTINUED | OUTPATIENT
Start: 2023-12-23 | End: 2023-12-23

## 2023-12-23 RX ORDER — DIPHENHYDRAMINE HCL 50 MG
25 CAPSULE ORAL ONCE
Refills: 0 | Status: DISCONTINUED | OUTPATIENT
Start: 2023-12-23 | End: 2023-12-24

## 2023-12-23 RX ORDER — INSULIN ASPART 100 [IU]/ML
12 INJECTION, SOLUTION SUBCUTANEOUS
Refills: 0 | DISCHARGE

## 2023-12-23 RX ORDER — ERGOCALCIFEROL 1.25 MG/1
50000 CAPSULE ORAL
Refills: 0 | Status: DISCONTINUED | OUTPATIENT
Start: 2023-12-23 | End: 2023-12-29

## 2023-12-23 RX ORDER — ACETAZOLAMIDE 250 MG/1
1 TABLET ORAL
Refills: 0 | DISCHARGE

## 2023-12-23 RX ORDER — INSULIN LISPRO 100/ML
VIAL (ML) SUBCUTANEOUS AT BEDTIME
Refills: 0 | Status: DISCONTINUED | OUTPATIENT
Start: 2023-12-23 | End: 2023-12-29

## 2023-12-23 RX ORDER — ATORVASTATIN CALCIUM 80 MG/1
20 TABLET, FILM COATED ORAL AT BEDTIME
Refills: 0 | Status: DISCONTINUED | OUTPATIENT
Start: 2023-12-23 | End: 2023-12-29

## 2023-12-23 RX ORDER — ATORVASTATIN CALCIUM 80 MG/1
1 TABLET, FILM COATED ORAL
Qty: 30 | Refills: 0
Start: 2023-12-23 | End: 2024-01-21

## 2023-12-23 RX ORDER — METOCLOPRAMIDE HCL 10 MG
10 TABLET ORAL EVERY 8 HOURS
Refills: 0 | Status: COMPLETED | OUTPATIENT
Start: 2023-12-23 | End: 2023-12-25

## 2023-12-23 RX ORDER — INSULIN LISPRO 100/ML
VIAL (ML) SUBCUTANEOUS
Refills: 0 | Status: DISCONTINUED | OUTPATIENT
Start: 2023-12-23 | End: 2023-12-29

## 2023-12-23 RX ADMIN — SPIRONOLACTONE 25 MILLIGRAM(S): 25 TABLET, FILM COATED ORAL at 06:31

## 2023-12-23 RX ADMIN — SODIUM CHLORIDE 75 MILLILITER(S): 9 INJECTION INTRAMUSCULAR; INTRAVENOUS; SUBCUTANEOUS at 13:21

## 2023-12-23 RX ADMIN — ACETAZOLAMIDE 500 MILLIGRAM(S): 250 TABLET ORAL at 06:31

## 2023-12-23 RX ADMIN — INSULIN GLARGINE 50 UNIT(S): 100 INJECTION, SOLUTION SUBCUTANEOUS at 22:36

## 2023-12-23 RX ADMIN — Medication 10 MILLIGRAM(S): at 22:23

## 2023-12-23 RX ADMIN — Medication 30 MILLIGRAM(S): at 23:22

## 2023-12-23 RX ADMIN — PANTOPRAZOLE SODIUM 40 MILLIGRAM(S): 20 TABLET, DELAYED RELEASE ORAL at 12:39

## 2023-12-23 RX ADMIN — Medication 30 MILLIGRAM(S): at 12:39

## 2023-12-23 RX ADMIN — Medication 18 UNIT(S): at 12:51

## 2023-12-23 RX ADMIN — OXYCODONE HYDROCHLORIDE 15 MILLIGRAM(S): 5 TABLET ORAL at 09:54

## 2023-12-23 RX ADMIN — OXYCODONE HYDROCHLORIDE 15 MILLIGRAM(S): 5 TABLET ORAL at 20:10

## 2023-12-23 RX ADMIN — Medication 30 MILLIGRAM(S): at 07:31

## 2023-12-23 RX ADMIN — Medication 30 MILLIGRAM(S): at 22:23

## 2023-12-23 RX ADMIN — Medication 25 MILLIGRAM(S): at 12:38

## 2023-12-23 RX ADMIN — OXYCODONE HYDROCHLORIDE 15 MILLIGRAM(S): 5 TABLET ORAL at 19:40

## 2023-12-23 RX ADMIN — Medication 10 MILLIGRAM(S): at 06:31

## 2023-12-23 RX ADMIN — NALOXEGOL OXALATE 25 MILLIGRAM(S): 12.5 TABLET, FILM COATED ORAL at 12:40

## 2023-12-23 RX ADMIN — Medication 30 MILLIGRAM(S): at 06:31

## 2023-12-23 RX ADMIN — Medication 2: at 09:26

## 2023-12-23 RX ADMIN — Medication 6 MILLIGRAM(S): at 02:13

## 2023-12-23 RX ADMIN — Medication 4: at 12:51

## 2023-12-23 RX ADMIN — Medication 10 MILLIGRAM(S): at 12:39

## 2023-12-23 RX ADMIN — OXYCODONE HYDROCHLORIDE 15 MILLIGRAM(S): 5 TABLET ORAL at 03:13

## 2023-12-23 RX ADMIN — ATORVASTATIN CALCIUM 20 MILLIGRAM(S): 80 TABLET, FILM COATED ORAL at 22:22

## 2023-12-23 RX ADMIN — Medication 18 UNIT(S): at 18:32

## 2023-12-23 RX ADMIN — ERGOCALCIFEROL 50000 UNIT(S): 1.25 CAPSULE ORAL at 13:21

## 2023-12-23 RX ADMIN — Medication 2: at 22:35

## 2023-12-23 RX ADMIN — CARVEDILOL PHOSPHATE 3.12 MILLIGRAM(S): 80 CAPSULE, EXTENDED RELEASE ORAL at 06:31

## 2023-12-23 RX ADMIN — BUMETANIDE 2 MILLIGRAM(S): 0.25 INJECTION INTRAMUSCULAR; INTRAVENOUS at 06:31

## 2023-12-23 RX ADMIN — OXYCODONE HYDROCHLORIDE 15 MILLIGRAM(S): 5 TABLET ORAL at 09:24

## 2023-12-23 RX ADMIN — LISINOPRIL 10 MILLIGRAM(S): 2.5 TABLET ORAL at 06:31

## 2023-12-23 RX ADMIN — OXYCODONE HYDROCHLORIDE 15 MILLIGRAM(S): 5 TABLET ORAL at 02:13

## 2023-12-23 RX ADMIN — Medication 30 MILLIGRAM(S): at 13:09

## 2023-12-23 NOTE — CONSULT NOTE ADULT - ATTENDING COMMENTS
53F DM2 severe obesity BMI 53.7  Inpatient basal bolus insulin as outlined.  On dc change Ozempic to Mounjaro plus other agents as noted.  Sending workup for [place]. Would like to establish care with [facility] endocrinology.  Complex patient high level decision making.    John Presley MD  Division of Endocrinology  Pager: [phone]    If after 6PM or before 9AM, or on weekends/holidays, please call endocrine answering service for assistance ([phone]).  For nonurgent matters email [email or web address] for assistance. 53F DM2 severe obesity BMI 53.7  Inpatient basal bolus insulin as outlined.  On dc change Ozempic to Mounjaro plus other agents as noted.  Sending workup for Cushings. Would like to establish care with [facility] endocrinology.  Complex patient high level decision making.    John Presley MD  Division of Endocrinology  Pager: [phone]    If after 6PM or before 9AM, or on weekends/holidays, please call endocrine answering service for assistance ([phone]).  For nonurgent matters email [email or web address] for assistance.

## 2023-12-23 NOTE — DISCHARGE NOTE NURSING/CASE MANAGEMENT/SOCIAL WORK - PATIENT PORTAL LINK FT
You can access the FollowMyHealth Patient Portal offered by [facility] by registering at the following website: [email or web address]. By joining [employer]’s FollowMyHealth portal, you will also be able to view your health information using other applications (apps) compatible with our system. You can access the FollowMyHealth Patient Portal offered by [facility] by registering at the following website: [email or web address]. By joining [employer]’s FollowMyHealth portal, you will also be able to view your health information using other applications (apps) compatible with our system.

## 2023-12-23 NOTE — PROGRESS NOTE ADULT - SUBJECTIVE AND OBJECTIVE BOX
Belle Lewis MD  [facility] Division of Hospital Medicine  Pager [phone] (M-F 8AM-5PM)  Other Times: [id number]    Patient is a 53y old  Female who presents with a chief complaint of Headache, tinnitus (22 Dec 2023 12:37)    SUBJECTIVE / OVERNIGHT EVENTS: still having headaches     MEDICATIONS  (STANDING):  acetaZOLAMIDE    Tablet 500 milliGRAM(s) Oral two times a day  atorvastatin 20 milliGRAM(s) Oral at bedtime  buMETAnide 2 milliGRAM(s) Oral daily  carvedilol 3.125 milliGRAM(s) Oral every 12 hours  dextrose 5%. 1000 milliLiter(s) (50 mL/Hr) IV Continuous <Continuous>  dextrose 5%. 1000 milliLiter(s) (100 mL/Hr) IV Continuous <Continuous>  dextrose 50% Injectable 25 Gram(s) IV Push once  dextrose 50% Injectable 12.5 Gram(s) IV Push once  dextrose 50% Injectable 25 Gram(s) IV Push once  ergocalciferol 15590 Unit(s) Oral <User Schedule>  glucagon  Injectable 1 milliGRAM(s) IntraMuscular once  insulin glargine Injectable (LANTUS) 48 Unit(s) SubCutaneous at bedtime  insulin lispro (ADMELOG) corrective regimen sliding scale   SubCutaneous three times a day before meals  insulin lispro (ADMELOG) corrective regimen sliding scale   SubCutaneous at bedtime  insulin lispro Injectable (ADMELOG) 18 Unit(s) SubCutaneous three times a day before meals  ketorolac   Injectable 30 milliGRAM(s) IV Push every 8 hours  lisinopril 10 milliGRAM(s) Oral daily  metoclopramide Injectable 10 milliGRAM(s) IV Push every 8 hours  naloxegol 25 milliGRAM(s) Oral daily  pantoprazole   Suspension 40 milliGRAM(s) Oral daily  sodium chloride 0.9%. 1000 milliLiter(s) (75 mL/Hr) IV Continuous <Continuous>  spironolactone 25 milliGRAM(s) Oral daily    MEDICATIONS  (PRN):  acetaminophen 325 mG/butalbital 50 mG/caffeine 40 mG 2 Tablet(s) Oral every 8 hours PRN migraine  aluminum hydroxide/magnesium hydroxide/simethicone Suspension 30 milliLiter(s) Oral every 4 hours PRN Dyspepsia  baclofen 5 milliGRAM(s) Oral every 8 hours PRN Muscle Spasm  dextrose Oral Gel 15 Gram(s) Oral once PRN Blood Glucose LESS THAN 70 milliGRAM(s)/deciliter  melatonin 6 milliGRAM(s) Oral at bedtime PRN Sleep  ondansetron Injectable 4 milliGRAM(s) IV Push every 8 hours PRN Nausea and/or Vomiting  oxyCODONE    IR 15 milliGRAM(s) Oral every 6 hours PRN Moderate Pain (4 - 6)      PHYSICAL EXAM:  Vital Signs Last 24 Hrs  T(C): 36.7 (23 Dec 2023 09:20), Max: 37 (22 Dec 2023 22:16)  T(F): 98.1 (23 Dec 2023 09:20), Max: 98.6 (22 Dec 2023 22:16)  HR: 66 (23 Dec 2023 09:20) (60 - 76)  BP: 109/65 (23 Dec 2023 09:20) (104/53 - 127/66)  BP(mean): --  RR: 17 (23 Dec 2023 09:20) (17 - 18)  SpO2: 98% (23 Dec 2023 09:20) (98% - 100%)    Parameters below as of 23 Dec 2023 09:20  Patient On (Oxygen Delivery Method): room air        CONSTITUTIONAL: NAD, well-developed, well-groomed  RESPIRATORY: Normal respiratory effort; lungs are clear to auscultation bilaterally  CARDIOVASCULAR: Regular rate and rhythm, normal S1 and S2, no murmur/rub/gallop; No lower extremity edema  GASTROINTESTINAL: Nontender to palpation, normoactive bowel sounds, no rebound/guarding; No hepatosplenomegaly  MUSCULOSKELETAL:  no clubbing or cyanosis of digits; no joint swelling or tenderness to palpation  NEUROLOGY: non-focal; no gross sensory deficits   PSYCH: A+O to person, place, and time; affect appropriate  SKIN: No rashes; warm     LABS:                        10.5   10.60 )-----------( 423      ( 23 Dec 2023 06:22 )             32.0     12-23    135  |  106  |  32<H>  ----------------------------<  235<H>  3.8   |  18<L>  |  1.06    Ca    8.3<L>      23 Dec 2023 06:22  Phos  3.9     12-23  Mg     2.00     12-23            Urinalysis Basic - ( 23 Dec 2023 06:22 )    Color: x / Appearance: x / SG: x / pH: x  Gluc: 235 mg/dL / Ketone: x  / Bili: x / Urobili: x   Blood: x / Protein: x / Nitrite: x   Leuk Esterase: x / RBC: x / WBC x   Sq Epi: x / Non Sq Epi: x / Bacteria: x          RADIOLOGY & ADDITIONAL TESTS:  Results Reviewed:   Imaging Personally Reviewed:  Electrocardiogram Personally Reviewed:    COORDINATION OF CARE:  Care Discussed with Consultants/Other Providers [Y/N]:  Prior or Outpatient Records Reviewed [Y/N]:   Belle Lewis MD  [facility] Division of Hospital Medicine  Pager [phone] (M-F 8AM-5PM)  Other Times: [id number]    Patient is a 53y old  Female who presents with a chief complaint of Headache, tinnitus (22 Dec 2023 12:37)    SUBJECTIVE / OVERNIGHT EVENTS: still having headaches     MEDICATIONS  (STANDING):  acetaZOLAMIDE    Tablet 500 milliGRAM(s) Oral two times a day  atorvastatin 20 milliGRAM(s) Oral at bedtime  buMETAnide 2 milliGRAM(s) Oral daily  carvedilol 3.125 milliGRAM(s) Oral every 12 hours  dextrose 5%. 1000 milliLiter(s) (50 mL/Hr) IV Continuous <Continuous>  dextrose 5%. 1000 milliLiter(s) (100 mL/Hr) IV Continuous <Continuous>  dextrose 50% Injectable 25 Gram(s) IV Push once  dextrose 50% Injectable 12.5 Gram(s) IV Push once  dextrose 50% Injectable 25 Gram(s) IV Push once  ergocalciferol 94701 Unit(s) Oral <User Schedule>  glucagon  Injectable 1 milliGRAM(s) IntraMuscular once  insulin glargine Injectable (LANTUS) 48 Unit(s) SubCutaneous at bedtime  insulin lispro (ADMELOG) corrective regimen sliding scale   SubCutaneous three times a day before meals  insulin lispro (ADMELOG) corrective regimen sliding scale   SubCutaneous at bedtime  insulin lispro Injectable (ADMELOG) 18 Unit(s) SubCutaneous three times a day before meals  ketorolac   Injectable 30 milliGRAM(s) IV Push every 8 hours  lisinopril 10 milliGRAM(s) Oral daily  metoclopramide Injectable 10 milliGRAM(s) IV Push every 8 hours  naloxegol 25 milliGRAM(s) Oral daily  pantoprazole   Suspension 40 milliGRAM(s) Oral daily  sodium chloride 0.9%. 1000 milliLiter(s) (75 mL/Hr) IV Continuous <Continuous>  spironolactone 25 milliGRAM(s) Oral daily    MEDICATIONS  (PRN):  acetaminophen 325 mG/butalbital 50 mG/caffeine 40 mG 2 Tablet(s) Oral every 8 hours PRN migraine  aluminum hydroxide/magnesium hydroxide/simethicone Suspension 30 milliLiter(s) Oral every 4 hours PRN Dyspepsia  baclofen 5 milliGRAM(s) Oral every 8 hours PRN Muscle Spasm  dextrose Oral Gel 15 Gram(s) Oral once PRN Blood Glucose LESS THAN 70 milliGRAM(s)/deciliter  melatonin 6 milliGRAM(s) Oral at bedtime PRN Sleep  ondansetron Injectable 4 milliGRAM(s) IV Push every 8 hours PRN Nausea and/or Vomiting  oxyCODONE    IR 15 milliGRAM(s) Oral every 6 hours PRN Moderate Pain (4 - 6)      PHYSICAL EXAM:  Vital Signs Last 24 Hrs  T(C): 36.7 (23 Dec 2023 09:20), Max: 37 (22 Dec 2023 22:16)  T(F): 98.1 (23 Dec 2023 09:20), Max: 98.6 (22 Dec 2023 22:16)  HR: 66 (23 Dec 2023 09:20) (60 - 76)  BP: 109/65 (23 Dec 2023 09:20) (104/53 - 127/66)  BP(mean): --  RR: 17 (23 Dec 2023 09:20) (17 - 18)  SpO2: 98% (23 Dec 2023 09:20) (98% - 100%)    Parameters below as of 23 Dec 2023 09:20  Patient On (Oxygen Delivery Method): room air        CONSTITUTIONAL: NAD, well-developed, well-groomed  RESPIRATORY: Normal respiratory effort; lungs are clear to auscultation bilaterally  CARDIOVASCULAR: Regular rate and rhythm, normal S1 and S2, no murmur/rub/gallop; No lower extremity edema  GASTROINTESTINAL: Nontender to palpation, normoactive bowel sounds, no rebound/guarding; No hepatosplenomegaly  MUSCULOSKELETAL:  no clubbing or cyanosis of digits; no joint swelling or tenderness to palpation  NEUROLOGY: non-focal; no gross sensory deficits   PSYCH: A+O to person, place, and time; affect appropriate  SKIN: No rashes; warm     LABS:                        10.5   10.60 )-----------( 423      ( 23 Dec 2023 06:22 )             32.0     12-23    135  |  106  |  32<H>  ----------------------------<  235<H>  3.8   |  18<L>  |  1.06    Ca    8.3<L>      23 Dec 2023 06:22  Phos  3.9     12-23  Mg     2.00     12-23            Urinalysis Basic - ( 23 Dec 2023 06:22 )    Color: x / Appearance: x / SG: x / pH: x  Gluc: 235 mg/dL / Ketone: x  / Bili: x / Urobili: x   Blood: x / Protein: x / Nitrite: x   Leuk Esterase: x / RBC: x / WBC x   Sq Epi: x / Non Sq Epi: x / Bacteria: x          RADIOLOGY & ADDITIONAL TESTS:  Results Reviewed:   Imaging Personally Reviewed:  Electrocardiogram Personally Reviewed:    COORDINATION OF CARE:  Care Discussed with Consultants/Other Providers [Y/N]:  Prior or Outpatient Records Reviewed [Y/N]:

## 2023-12-23 NOTE — CONSULT NOTE ADULT - ASSESSMENT
This is a 52 yo F /w a PMH of CHF, HTN, HLD,  Idiopathic intracranial hypertension, complex regional pain syndrome who presents with worsening headache. Endocrinology consulted for evaluation of type 2 diabetes.    #DM2  -agree with admelog 18 units before meals  -agree with lantus 50 units nightly  -start moderate admelog correction scales before meals  -moderate admelog correction scales at bedtime  -carb consistent diet  -FSG before meals and before bedtime  -target glucose 100-180mg/dL  -hypoglycemia protocol in place if needed    #discharge  -follow up with PCP  -c/w home tresiba 45 units daily, admelog 12 units TID AC, metformin 1000mg BID.  -increase ozempic to 0.5mg weekly  -can be evaluated about starting SGLT2i as an outpatient given CHF history    #HTN  -c/w coreg 3.125mg BID, spironolactone 25mg daily, and lisinopril 10mg daily  -BP target <130/80    #HLD  -c/w atorvastatin 20mg daily  outpatient lipid profile    Case discussed with Dr. Lena Copeland MD  Endocrine Fellow  Can be reached via teams. For follow up questions, discharge recommendations, or new consults, please call answering service at [phone] (weekdays); [phone] (nights/weekends) This is a 52 yo F /w a PMH of CHF, HTN, HLD,  Idiopathic intracranial hypertension, complex regional pain syndrome who presents with worsening headache. Endocrinology consulted for evaluation of type 2 diabetes.    #DM2  -agree with admelog 18 units before meals  -agree with lantus 50 units nightly  -start moderate admelog correction scales before meals  -moderate admelog correction scales at bedtime  -carb consistent diet  -FSG before meals and before bedtime  -target glucose 100-180mg/dL  -hypoglycemia protocol in place if needed    #discharge  -follow up with PCP  -c/w home tresiba 45 units daily, admelog 12 units TID AC, metformin 1000mg BID.  -increase ozempic to 0.5mg weekly  -can be evaluated about starting SGLT2i as an outpatient given CHF history    #HTN  -c/w coreg 3.125mg BID, spironolactone 25mg daily, and lisinopril 10mg daily  -BP target <130/80    #HLD  -c/w atorvastatin 20mg daily  outpatient lipid profile    Case discussed with Dr. Lena Copeland MD  Endocrine Fellow  Can be reached via teams. For follow up questions, discharge recommendations, or new consults, please call answering service at [phone] (weekdays); [phone] (nights/weekends) This is a 54 yo F /w a PMH of CHF, HTN, HLD,  Idiopathic intracranial hypertension, complex regional pain syndrome who presents with worsening headache. Endocrinology consulted for evaluation of type 2 diabetes.    #DM2  -agree with admelog 18 units before meals  -agree with lantus 50 units nightly  -start moderate admelog correction scales before meals  -moderate admelog correction scales at bedtime  -carb consistent diet  -FSG before meals and before bedtime  -target glucose 100-180mg/dL  -hypoglycemia protocol in place if needed    #discharge  -follow up with PCP  -c/w home tresiba 45 units daily, admelog 12 units TID AC, metformin 1000mg BID.  -can see if patient can be transitioned to Mounjaro for improved weight loss benefit. If unable to obtain Mounjaro, can increase ozempic to 0.5mg weekly  -can be evaluated about starting SGLT2i as an outpatient given CHF history    #r/o Cushing's disease - obesity, htn, DM2, easy bruising, hair loss, hirsutism, irregular menses  -please check 24 hr urine cortisol and 24 hour urine creatinine   -after this is done can also obtain 1mg dexamethasone suppression test    #HTN  -c/w coreg 3.125mg BID, spironolactone 25mg daily, and lisinopril 10mg daily  -BP target <130/80    #HLD  -c/w atorvastatin 20mg daily  outpatient lipid profile    Case discussed with Dr. Lena Copeland MD  Endocrine Fellow  Can be reached via teams. For follow up questions, discharge recommendations, or new consults, please call answering service at [phone] (weekdays); [phone] (nights/weekends) This is a 52 yo F /w a PMH of CHF, HTN, HLD,  Idiopathic intracranial hypertension, complex regional pain syndrome who presents with worsening headache. Endocrinology consulted for evaluation of type 2 diabetes.    #DM2  -agree with admelog 18 units before meals  -agree with lantus 50 units nightly  -start moderate admelog correction scales before meals  -moderate admelog correction scales at bedtime  -carb consistent diet  -FSG before meals and before bedtime  -target glucose 100-180mg/dL  -hypoglycemia protocol in place if needed    #discharge  -follow up with PCP  -c/w home tresiba 45 units daily, admelog 12 units TID AC, metformin 1000mg BID.  -can see if patient can be transitioned to Mounjaro for improved weight loss benefit. If unable to obtain Mounjaro, can increase ozempic to 0.5mg weekly  -can be evaluated about starting SGLT2i as an outpatient given CHF history    #r/o Cushing's disease - obesity, htn, DM2, easy bruising, hair loss, hirsutism, irregular menses  -please check 24 hr urine cortisol and 24 hour urine creatinine   -after this is done can also obtain 1mg dexamethasone suppression test    #HTN  -c/w coreg 3.125mg BID, spironolactone 25mg daily, and lisinopril 10mg daily  -BP target <130/80    #HLD  -c/w atorvastatin 20mg daily  outpatient lipid profile    Case discussed with Dr. Lena Copeland MD  Endocrine Fellow  Can be reached via teams. For follow up questions, discharge recommendations, or new consults, please call answering service at [phone] (weekdays); [phone] (nights/weekends) This is a 54 yo F /w a PMH of CHF, HTN, HLD,  Idiopathic intracranial hypertension, complex regional pain syndrome who presents with worsening headache. Endocrinology consulted for evaluation of type 2 diabetes.    #DM2  -agree with admelog 18 units before meals  -agree with lantus 50 units nightly  -start moderate admelog correction scales before meals  -moderate admelog correction scales at bedtime  -carb consistent diet  -FSG before meals and before bedtime  -target glucose 100-180mg/dL  -hypoglycemia protocol in place if needed    #discharge  -follow up with PCP  -c/w home tresiba 45 units daily, admelog 12 units TID AC, metformin 1000mg BID.  -can see if patient can be transitioned to Mounjaro 2.5mg SQ once weekly for improved weight loss benefit. If unable to obtain Mounjaro, can increase ozempic to 0.5mg weekly  -can be evaluated about starting SGLT2i as an outpatient given CHF history    #r/o Cushing's disease - obesity, htn, DM2, easy bruising, hair loss, hirsutism, irregular menses  -please check 24 hr urine cortisol and 24 hour urine creatinine   -after this is done can also obtain 1mg dexamethasone suppression test    #HTN  -c/w coreg 3.125mg BID, spironolactone 25mg daily, and lisinopril 10mg daily  -BP target <130/80    #HLD  -c/w atorvastatin 20mg daily  outpatient lipid profile    Case discussed with Dr. Lena Copeland MD  Endocrine Fellow  Can be reached via teams. For follow up questions, discharge recommendations, or new consults, please call answering service at [phone] (weekdays); [phone] (nights/weekends) This is a 52 yo F /w a PMH of CHF, HTN, HLD,  Idiopathic intracranial hypertension, complex regional pain syndrome who presents with worsening headache. Endocrinology consulted for evaluation of type 2 diabetes.    #DM2  -agree with admelog 18 units before meals  -agree with lantus 50 units nightly  -start moderate admelog correction scales before meals  -moderate admelog correction scales at bedtime  -carb consistent diet  -FSG before meals and before bedtime  -target glucose 100-180mg/dL  -hypoglycemia protocol in place if needed    #discharge  -follow up with PCP  -c/w home tresiba 45 units daily, admelog 12 units TID AC, metformin 1000mg BID.  -can see if patient can be transitioned to Mounjaro 2.5mg SQ once weekly for improved weight loss benefit. If unable to obtain Mounjaro, can increase ozempic to 0.5mg weekly  -can be evaluated about starting SGLT2i as an outpatient given CHF history    #r/o Cushing's disease - obesity, htn, DM2, easy bruising, hair loss, hirsutism, irregular menses  -please check 24 hr urine cortisol and 24 hour urine creatinine   -after this is done can also obtain 1mg dexamethasone suppression test    #HTN  -c/w coreg 3.125mg BID, spironolactone 25mg daily, and lisinopril 10mg daily  -BP target <130/80    #HLD  -c/w atorvastatin 20mg daily  outpatient lipid profile    Case discussed with Dr. Lena Copeland MD  Endocrine Fellow  Can be reached via teams. For follow up questions, discharge recommendations, or new consults, please call answering service at [phone] (weekdays); [phone] (nights/weekends)

## 2023-12-23 NOTE — CONSULT NOTE ADULT - SUBJECTIVE AND OBJECTIVE BOX
HPI:  52 yo F with Pmhx of CHF, HTN, HLD, DM, Idiopathic intracranial hypertension (diagnosed 2020), chronic back pain (intercostal neuralgia/complex regional pain syndrome) s/p spinal stimulator presents to the ED with worsening headache. Patient reports that her headache started 2 days ago and it is progressively getting worse. It is band-like in quality, radiates across her head, and constant. It worsens with movement. It also associated with photophobia, tinnitus, visual flashes, and blurry vision. She also hears a swooshing sound in her ears. She denies any recent fever, chills, neck stiffness, chest pain or abdominal pain. 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 She initially presented to [facility] and was transferred to [facility] for f/u with neurology and ophtho.   In the ED, her vitals were notable for HTN. Labs were notable for leukocytosis, hypokalemia, hyperglycemia. CT head showed no acute hemorrhage or mass effect. CT venogram showed no evidence of venous sinus thrombosis.  (07 Dec 2023 16:59)      Consulted for: type 2 diabetes  This is a 52 yo F /w a PMH of CHF, HTN, HLD,  Idiopathic intracranial hypertension, complex regional pain syndrome who presents with worsening headache. Endocrinology consulted for evaluation of type 2 diabetes    Patient reports DM2 since age 17  Follows with PCP  A1c 7.5%  Current home medications include:  Tresiba 45 units daily  admelog 12 units before meals  metformin 1000mg BID  Ozempic 0.25mg weekly - started one month ago. Tolerating well    Patient has been losing weight. Reports he was at one point over 500 pounds. She is currently 352 pounds.   Patient reports a history of neuropathy and nephropathy but no history of retinopathy  Has rare hypoglycemia at home <1x per month    In the hospital patient has been managed with basal bolus insulin. She receive 48 units lantus last night and sugar trend 222 to 232. She did not get admelog with breakfast and FSG at lunch went up to 318    Patient reports a history of obesity, hirsutism, shaves a mustache at least once per month, easy bruising, thin skin, irregular menses (improved with IUD). She denies purple striae    PAST MEDICAL & SURGICAL HISTORY:  Diabetes      HTN (hypertension)      Neuropathy      Obesity      Former cigarette smoker  (smoked x 45 years; quit ~2013)      Marijuana smoker, continuous  (states smokes 3 - 4 times per day for pain management reasons)      Neuralgia  (pt states hx/o "intercostal neuralgia")      Cardiac abnormality  (pt states hx/o "cardiac event"; is unclear on diagnosis; denies hx/o MI)      Diabetes      Essential hypertension      IBS (irritable bowel syndrome)      Complex regional pain syndrome type 1      History of cholecystectomy      History of hand surgery  (pt states she had 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12)          FAMILY HISTORY:  FH: HTN (hypertension)        Social History: denies all toxic habits    Home Medications:  baclofen 5 mg oral tablet: 1 tab(s) orally every 8 hours, As needed, Muscle Spasm (08 Dec 2023 17:30)  bumetanide 2 mg oral tablet: 1 tab(s) orally once a day (08 Dec 2023 17:30)  carvedilol 3.125 mg oral tablet: 1 tab(s) orally every 12 hours (08 Dec 2023 17:30)  DULoxetine 30 mg oral delayed release capsule: 1 cap(s) orally 2 times a day (08 Dec 2023 17:32)  lisinopril 10 mg oral tablet: 1 tab(s) orally once a day (08 Dec 2023 17:30)  melatonin 3 mg oral tablet: 2 tab(s) orally once a day (at bedtime) As needed Sleep (23 Dec 2023 11:42)  metFORMIN 1000 mg oral tablet: 1 tab(s) orally 2 times a day (08 Dec 2023 17:30)  naloxegol 25 mg oral tablet: 1 tab(s) orally once a day (08 Dec 2023 17:30)  pantoprazole 40 mg oral delayed release tablet: 1 tab(s) orally once a day (before a meal) (08 Dec 2023 17:30)  pregabalin 100 mg oral capsule: 1 cap(s) orally 3 times a day (08 Dec 2023 17:32)  semaglutide 0.25 mg/0.5 mL (0.25 mg dose) subcutaneous solution: 0.25 milligram(s) subcutaneously once a week (08 Dec 2023 17:30)  spironolactone 25 mg oral tablet: 1 tab(s) orally once a day (08 Dec 2023 17:30)      MEDICATIONS  (STANDING):  acetaZOLAMIDE    Tablet 500 milliGRAM(s) Oral two times a day  atorvastatin 20 milliGRAM(s) Oral at bedtime  buMETAnide 2 milliGRAM(s) Oral daily  carvedilol 3.125 milliGRAM(s) Oral every 12 hours  dextrose 5%. 1000 milliLiter(s) (50 mL/Hr) IV Continuous <Continuous>  dextrose 5%. 1000 milliLiter(s) (100 mL/Hr) IV Continuous <Continuous>  dextrose 50% Injectable 25 Gram(s) IV Push once  dextrose 50% Injectable 25 Gram(s) IV Push once  dextrose 50% Injectable 12.5 Gram(s) IV Push once  ergocalciferol 20643 Unit(s) Oral <User Schedule>  glucagon  Injectable 1 milliGRAM(s) IntraMuscular once  insulin glargine Injectable (LANTUS) 48 Unit(s) SubCutaneous at bedtime  insulin lispro (ADMELOG) corrective regimen sliding scale   SubCutaneous three times a day before meals  insulin lispro (ADMELOG) corrective regimen sliding scale   SubCutaneous at bedtime  insulin lispro Injectable (ADMELOG) 18 Unit(s) SubCutaneous three times a day before meals  ketorolac   Injectable 30 milliGRAM(s) IV Push every 8 hours  lisinopril 10 milliGRAM(s) Oral daily  metoclopramide Injectable 10 milliGRAM(s) IV Push every 8 hours  naloxegol 25 milliGRAM(s) Oral daily  pantoprazole   Suspension 40 milliGRAM(s) Oral daily  sodium chloride 0.9%. 1000 milliLiter(s) (75 mL/Hr) IV Continuous <Continuous>  spironolactone 25 milliGRAM(s) Oral daily    MEDICATIONS  (PRN):  acetaminophen 325 mG/butalbital 50 mG/caffeine 40 mG 2 Tablet(s) Oral every 8 hours PRN migraine  aluminum hydroxide/magnesium hydroxide/simethicone Suspension 30 milliLiter(s) Oral every 4 hours PRN Dyspepsia  baclofen 5 milliGRAM(s) Oral every 8 hours PRN Muscle Spasm  dextrose Oral Gel 15 Gram(s) Oral once PRN Blood Glucose LESS THAN 70 milliGRAM(s)/deciliter  melatonin 6 milliGRAM(s) Oral at bedtime PRN Sleep  ondansetron Injectable 4 milliGRAM(s) IV Push every 8 hours PRN Nausea and/or Vomiting  oxyCODONE    IR 15 milliGRAM(s) Oral every 6 hours PRN Moderate Pain (4 - 6)      Allergies    amitriptyline (Other)    Intolerances      Review of Systems:  Constitutional: No fever  Eyes: No blurry vision  Neuro: No tremors  HEENT: No pain  Cardiovascular: No chest pain, palpitations  Respiratory: No SOB, no cough  GI: No nausea, vomiting, abdominal pain  : No dysuria  Skin: no rash  Psych: no depression  Endocrine: no polyuria, polydipsia  Hem/lymph: no swelling  Osteoporosis: no fractures    PHYSICAL EXAM:  VITALS: T(C): 36.7 (12-23-23 @ 09:20)  T(F): 98.1 (12-23-23 @ 09:20), Max: 98.6 (12-22-23 @ 22:16)  HR: 66 (12-23-23 @ 09:20) (60 - 76)  BP: 109/65 (12-23-23 @ 09:20) (104/53 - 123/66)  RR:  (17 - 18)  SpO2:  (98% - 100%)  Wt(kg): --  GENERAL: NAD, + moon facies  EYES: No proptosis, no lid lag, anicteric  THYROID: Normal size, no palpable nodules  RESPIRATORY: Clear to auscultation bilaterally  CARDIOVASCULAR: Regular rate and rhythm  GI: Soft, nontender, non distended. Pale striae  EXT: b/l feet without wounds; 2+ pulses  PSYCH: Alert and oriented x 3, reactive mood    POCT Blood Glucose.: 318 mg/dL (12-23-23 @ 12:31)  POCT Blood Glucose.: 232 mg/dL (12-23-23 @ 09:16)  POCT Blood Glucose.: 222 mg/dL (12-22-23 @ 22:37)  POCT Blood Glucose.: 268 mg/dL (12-22-23 @ 17:45)  POCT Blood Glucose.: 254 mg/dL (12-22-23 @ 13:38)  POCT Blood Glucose.: 279 mg/dL (12-22-23 @ 12:28)  POCT Blood Glucose.: 176 mg/dL (12-22-23 @ 09:17)  POCT Blood Glucose.: 183 mg/dL (12-21-23 @ 21:34)  POCT Blood Glucose.: 255 mg/dL (12-21-23 @ 17:40)  POCT Blood Glucose.: 156 mg/dL (12-21-23 @ 12:31)  POCT Blood Glucose.: 158 mg/dL (12-21-23 @ 08:46)  POCT Blood Glucose.: 135 mg/dL (12-20-23 @ 21:03)  POCT Blood Glucose.: 145 mg/dL (12-20-23 @ 17:36)                            10.5   10.60 )-----------( 423      ( 23 Dec 2023 06:22 )             32.0       12-23    135  |  106  |  32<H>  ----------------------------<  235<H>  3.8   |  18<L>  |  1.06    eGFR: 63    Ca    8.3<L>      12-23  Mg     2.00     12-23  Phos  3.9     12-23        Thyroid Function Tests:  12-23 @ 06:22 TSH 4.45 FreeT4 1.3 T3 -- Anti TPO -- Anti Thyroglobulin Ab -- TSI --      A1C with Estimated Average Glucose Result: 7.5 % (12-09-23 @ 05:12)      12-23 Chol 150 Direct LDL -- LDL calculated 85 HDL 38<L> Trig 135    Radiology:                HPI:  52 yo F with Pmhx of CHF, HTN, HLD, DM, Idiopathic intracranial hypertension (diagnosed 2020), chronic back pain (intercostal neuralgia/complex regional pain syndrome) s/p spinal stimulator presents to the ED with worsening headache. Patient reports that her headache started 2 days ago and it is progressively getting worse. It is band-like in quality, radiates across her head, and constant. It worsens with movement. It also associated with photophobia, tinnitus, visual flashes, and blurry vision. She also hears a swooshing sound in her ears. She denies any recent fever, chills, neck stiffness, chest pain or abdominal pain. 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 She initially presented to [facility] and was transferred to [facility] for f/u with neurology and ophtho.   In the ED, her vitals were notable for HTN. Labs were notable for leukocytosis, hypokalemia, hyperglycemia. CT head showed no acute hemorrhage or mass effect. CT venogram showed no evidence of venous sinus thrombosis.  (07 Dec 2023 16:59)      Consulted for: type 2 diabetes  This is a 52 yo F /w a PMH of CHF, HTN, HLD,  Idiopathic intracranial hypertension, complex regional pain syndrome who presents with worsening headache. Endocrinology consulted for evaluation of type 2 diabetes    Patient reports DM2 since age 17  Follows with PCP  A1c 7.5%  Current home medications include:  Tresiba 45 units daily  admelog 12 units before meals  metformin 1000mg BID  Ozempic 0.25mg weekly - started one month ago. Tolerating well    Patient has been losing weight. Reports he was at one point over 500 pounds. She is currently 352 pounds.   Patient reports a history of neuropathy and nephropathy but no history of retinopathy  Has rare hypoglycemia at home <1x per month    In the hospital patient has been managed with basal bolus insulin. She receive 48 units lantus last night and sugar trend 222 to 232. She did not get admelog with breakfast and FSG at lunch went up to 318    Patient reports a history of obesity, hirsutism, shaves a mustache at least once per month, easy bruising, thin skin, irregular menses (improved with IUD). She denies purple striae    PAST MEDICAL & SURGICAL HISTORY:  Diabetes      HTN (hypertension)      Neuropathy      Obesity      Former cigarette smoker  (smoked x 45 years; quit ~2013)      Marijuana smoker, continuous  (states smokes 3 - 4 times per day for pain management reasons)      Neuralgia  (pt states hx/o "intercostal neuralgia")      Cardiac abnormality  (pt states hx/o "cardiac event"; is unclear on diagnosis; denies hx/o MI)      Diabetes      Essential hypertension      IBS (irritable bowel syndrome)      Complex regional pain syndrome type 1      History of cholecystectomy      History of hand surgery  (pt states she had 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12)          FAMILY HISTORY:  FH: HTN (hypertension)        Social History: denies all toxic habits    Home Medications:  baclofen 5 mg oral tablet: 1 tab(s) orally every 8 hours, As needed, Muscle Spasm (08 Dec 2023 17:30)  bumetanide 2 mg oral tablet: 1 tab(s) orally once a day (08 Dec 2023 17:30)  carvedilol 3.125 mg oral tablet: 1 tab(s) orally every 12 hours (08 Dec 2023 17:30)  DULoxetine 30 mg oral delayed release capsule: 1 cap(s) orally 2 times a day (08 Dec 2023 17:32)  lisinopril 10 mg oral tablet: 1 tab(s) orally once a day (08 Dec 2023 17:30)  melatonin 3 mg oral tablet: 2 tab(s) orally once a day (at bedtime) As needed Sleep (23 Dec 2023 11:42)  metFORMIN 1000 mg oral tablet: 1 tab(s) orally 2 times a day (08 Dec 2023 17:30)  naloxegol 25 mg oral tablet: 1 tab(s) orally once a day (08 Dec 2023 17:30)  pantoprazole 40 mg oral delayed release tablet: 1 tab(s) orally once a day (before a meal) (08 Dec 2023 17:30)  pregabalin 100 mg oral capsule: 1 cap(s) orally 3 times a day (08 Dec 2023 17:32)  semaglutide 0.25 mg/0.5 mL (0.25 mg dose) subcutaneous solution: 0.25 milligram(s) subcutaneously once a week (08 Dec 2023 17:30)  spironolactone 25 mg oral tablet: 1 tab(s) orally once a day (08 Dec 2023 17:30)      MEDICATIONS  (STANDING):  acetaZOLAMIDE    Tablet 500 milliGRAM(s) Oral two times a day  atorvastatin 20 milliGRAM(s) Oral at bedtime  buMETAnide 2 milliGRAM(s) Oral daily  carvedilol 3.125 milliGRAM(s) Oral every 12 hours  dextrose 5%. 1000 milliLiter(s) (50 mL/Hr) IV Continuous <Continuous>  dextrose 5%. 1000 milliLiter(s) (100 mL/Hr) IV Continuous <Continuous>  dextrose 50% Injectable 25 Gram(s) IV Push once  dextrose 50% Injectable 25 Gram(s) IV Push once  dextrose 50% Injectable 12.5 Gram(s) IV Push once  ergocalciferol 25262 Unit(s) Oral <User Schedule>  glucagon  Injectable 1 milliGRAM(s) IntraMuscular once  insulin glargine Injectable (LANTUS) 48 Unit(s) SubCutaneous at bedtime  insulin lispro (ADMELOG) corrective regimen sliding scale   SubCutaneous three times a day before meals  insulin lispro (ADMELOG) corrective regimen sliding scale   SubCutaneous at bedtime  insulin lispro Injectable (ADMELOG) 18 Unit(s) SubCutaneous three times a day before meals  ketorolac   Injectable 30 milliGRAM(s) IV Push every 8 hours  lisinopril 10 milliGRAM(s) Oral daily  metoclopramide Injectable 10 milliGRAM(s) IV Push every 8 hours  naloxegol 25 milliGRAM(s) Oral daily  pantoprazole   Suspension 40 milliGRAM(s) Oral daily  sodium chloride 0.9%. 1000 milliLiter(s) (75 mL/Hr) IV Continuous <Continuous>  spironolactone 25 milliGRAM(s) Oral daily    MEDICATIONS  (PRN):  acetaminophen 325 mG/butalbital 50 mG/caffeine 40 mG 2 Tablet(s) Oral every 8 hours PRN migraine  aluminum hydroxide/magnesium hydroxide/simethicone Suspension 30 milliLiter(s) Oral every 4 hours PRN Dyspepsia  baclofen 5 milliGRAM(s) Oral every 8 hours PRN Muscle Spasm  dextrose Oral Gel 15 Gram(s) Oral once PRN Blood Glucose LESS THAN 70 milliGRAM(s)/deciliter  melatonin 6 milliGRAM(s) Oral at bedtime PRN Sleep  ondansetron Injectable 4 milliGRAM(s) IV Push every 8 hours PRN Nausea and/or Vomiting  oxyCODONE    IR 15 milliGRAM(s) Oral every 6 hours PRN Moderate Pain (4 - 6)      Allergies    amitriptyline (Other)    Intolerances      Review of Systems:  Constitutional: No fever  Eyes: No blurry vision  Neuro: No tremors  HEENT: No pain  Cardiovascular: No chest pain, palpitations  Respiratory: No SOB, no cough  GI: No nausea, vomiting, abdominal pain  : No dysuria  Skin: no rash  Psych: no depression  Endocrine: no polyuria, polydipsia  Hem/lymph: no swelling  Osteoporosis: no fractures    PHYSICAL EXAM:  VITALS: T(C): 36.7 (12-23-23 @ 09:20)  T(F): 98.1 (12-23-23 @ 09:20), Max: 98.6 (12-22-23 @ 22:16)  HR: 66 (12-23-23 @ 09:20) (60 - 76)  BP: 109/65 (12-23-23 @ 09:20) (104/53 - 123/66)  RR:  (17 - 18)  SpO2:  (98% - 100%)  Wt(kg): --  GENERAL: NAD, + moon facies  EYES: No proptosis, no lid lag, anicteric  THYROID: Normal size, no palpable nodules  RESPIRATORY: Clear to auscultation bilaterally  CARDIOVASCULAR: Regular rate and rhythm  GI: Soft, nontender, non distended. Pale striae  EXT: b/l feet without wounds; 2+ pulses  PSYCH: Alert and oriented x 3, reactive mood    POCT Blood Glucose.: 318 mg/dL (12-23-23 @ 12:31)  POCT Blood Glucose.: 232 mg/dL (12-23-23 @ 09:16)  POCT Blood Glucose.: 222 mg/dL (12-22-23 @ 22:37)  POCT Blood Glucose.: 268 mg/dL (12-22-23 @ 17:45)  POCT Blood Glucose.: 254 mg/dL (12-22-23 @ 13:38)  POCT Blood Glucose.: 279 mg/dL (12-22-23 @ 12:28)  POCT Blood Glucose.: 176 mg/dL (12-22-23 @ 09:17)  POCT Blood Glucose.: 183 mg/dL (12-21-23 @ 21:34)  POCT Blood Glucose.: 255 mg/dL (12-21-23 @ 17:40)  POCT Blood Glucose.: 156 mg/dL (12-21-23 @ 12:31)  POCT Blood Glucose.: 158 mg/dL (12-21-23 @ 08:46)  POCT Blood Glucose.: 135 mg/dL (12-20-23 @ 21:03)  POCT Blood Glucose.: 145 mg/dL (12-20-23 @ 17:36)                            10.5   10.60 )-----------( 423      ( 23 Dec 2023 06:22 )             32.0       12-23    135  |  106  |  32<H>  ----------------------------<  235<H>  3.8   |  18<L>  |  1.06    eGFR: 63    Ca    8.3<L>      12-23  Mg     2.00     12-23  Phos  3.9     12-23        Thyroid Function Tests:  12-23 @ 06:22 TSH 4.45 FreeT4 1.3 T3 -- Anti TPO -- Anti Thyroglobulin Ab -- TSI --      A1C with Estimated Average Glucose Result: 7.5 % (12-09-23 @ 05:12)      12-23 Chol 150 Direct LDL -- LDL calculated 85 HDL 38<L> Trig 135    Radiology:

## 2023-12-23 NOTE — DISCHARGE NOTE NURSING/CASE MANAGEMENT/SOCIAL WORK - NSDCPEFALRISK_GEN_ALL_CORE
For information on Fall & Injury Prevention, visit: [email or web address] OR  [email or web address] OR  https://www.cdc.gov/steadi/patient.html For information on Fall & Injury Prevention, visit: [email or web address] OR  [email or web address] OR  https://www.cdc.gov/steadi/patient.html

## 2023-12-23 NOTE — DISCHARGE NOTE NURSING/CASE MANAGEMENT/SOCIAL WORK - NSDCVIVACCINE_GEN_ALL_CORE_FT
influenza, injectable, quadrivalent, preservative free; 04-Oct-2022 15:17; Elissa Cano (RN); [employer]; [id number] (Exp. Date: 30-Jun-2023); IntraMuscular; Deltoid Left.; 0.5 milliLiter(s); VIS (VIS Published: 06-Aug-2021, VIS Presented: 04-Oct-2022);    influenza, injectable, quadrivalent, preservative free; 04-Oct-2022 15:17; Elissa Cano (RN); [employer]; [id number] (Exp. Date: 30-Jun-2023); IntraMuscular; Deltoid Left.; 0.5 milliLiter(s); VIS (VIS Published: 06-Aug-2021, VIS Presented: 04-Oct-2022);

## 2023-12-23 NOTE — DISCHARGE NOTE NURSING/CASE MANAGEMENT/SOCIAL WORK - NSDCFUADDAPPT_GEN_ALL_CORE_FT
Outpatient Follow-up: Patient should follow-up with his/her ophthalmologist or with [facility] Department of Ophthalmology within 1 week of after discharge at:    [address]  [place] [address]  [phone]    F/u outpatient with neurology at [address]  Outpatient Follow-up: Patient should follow-up with his/her ophthalmologist or with [facility] Department of Ophthalmology within 1 week of after discharge at:    [address]  [place] [address]  [phone]    F/u outpatient with neurology at [address]

## 2023-12-24 LAB
ANION GAP SERPL CALC-SCNC: 11 MMOL/L — SIGNIFICANT CHANGE UP (ref 7–14)
ANION GAP SERPL CALC-SCNC: 11 MMOL/L — SIGNIFICANT CHANGE UP (ref 7–14)
BASOPHILS # BLD AUTO: 0.03 K/UL — SIGNIFICANT CHANGE UP (ref 0–0.2)
BASOPHILS # BLD AUTO: 0.03 K/UL — SIGNIFICANT CHANGE UP (ref 0–0.2)
BASOPHILS NFR BLD AUTO: 0.3 % — SIGNIFICANT CHANGE UP (ref 0–2)
BASOPHILS NFR BLD AUTO: 0.3 % — SIGNIFICANT CHANGE UP (ref 0–2)
BUN SERPL-MCNC: 28 MG/DL — HIGH (ref 7–23)
BUN SERPL-MCNC: 28 MG/DL — HIGH (ref 7–23)
CALCIUM SERPL-MCNC: 8.1 MG/DL — LOW (ref 8.4–10.5)
CALCIUM SERPL-MCNC: 8.1 MG/DL — LOW (ref 8.4–10.5)
CHLORIDE SERPL-SCNC: 107 MMOL/L — SIGNIFICANT CHANGE UP (ref 98–107)
CHLORIDE SERPL-SCNC: 107 MMOL/L — SIGNIFICANT CHANGE UP (ref 98–107)
CO2 SERPL-SCNC: 18 MMOL/L — LOW (ref 22–31)
CO2 SERPL-SCNC: 18 MMOL/L — LOW (ref 22–31)
COLLECT DURATION TIME UR: 24 HR — SIGNIFICANT CHANGE UP
COLLECT DURATION TIME UR: 24 HR — SIGNIFICANT CHANGE UP
CREAT SERPL-MCNC: 0.94 MG/DL — SIGNIFICANT CHANGE UP (ref 0.5–1.3)
CREAT SERPL-MCNC: 0.94 MG/DL — SIGNIFICANT CHANGE UP (ref 0.5–1.3)
EGFR: 73 ML/MIN/1.73M2 — SIGNIFICANT CHANGE UP
EGFR: 73 ML/MIN/1.73M2 — SIGNIFICANT CHANGE UP
EOSINOPHIL # BLD AUTO: 0.3 K/UL — SIGNIFICANT CHANGE UP (ref 0–0.5)
EOSINOPHIL # BLD AUTO: 0.3 K/UL — SIGNIFICANT CHANGE UP (ref 0–0.5)
EOSINOPHIL NFR BLD AUTO: 2.8 % — SIGNIFICANT CHANGE UP (ref 0–6)
EOSINOPHIL NFR BLD AUTO: 2.8 % — SIGNIFICANT CHANGE UP (ref 0–6)
GLUCOSE BLDC GLUCOMTR-MCNC: 163 MG/DL — HIGH (ref 70–99)
GLUCOSE BLDC GLUCOMTR-MCNC: 163 MG/DL — HIGH (ref 70–99)
GLUCOSE BLDC GLUCOMTR-MCNC: 164 MG/DL — HIGH (ref 70–99)
GLUCOSE BLDC GLUCOMTR-MCNC: 164 MG/DL — HIGH (ref 70–99)
GLUCOSE BLDC GLUCOMTR-MCNC: 196 MG/DL — HIGH (ref 70–99)
GLUCOSE BLDC GLUCOMTR-MCNC: 196 MG/DL — HIGH (ref 70–99)
GLUCOSE BLDC GLUCOMTR-MCNC: 207 MG/DL — HIGH (ref 70–99)
GLUCOSE BLDC GLUCOMTR-MCNC: 207 MG/DL — HIGH (ref 70–99)
GLUCOSE SERPL-MCNC: 181 MG/DL — HIGH (ref 70–99)
GLUCOSE SERPL-MCNC: 181 MG/DL — HIGH (ref 70–99)
HCT VFR BLD CALC: 32.2 % — LOW (ref 34.5–45)
HCT VFR BLD CALC: 32.2 % — LOW (ref 34.5–45)
HGB BLD-MCNC: 10.5 G/DL — LOW (ref 11.5–15.5)
HGB BLD-MCNC: 10.5 G/DL — LOW (ref 11.5–15.5)
IANC: 5.31 K/UL — SIGNIFICANT CHANGE UP (ref 1.8–7.4)
IANC: 5.31 K/UL — SIGNIFICANT CHANGE UP (ref 1.8–7.4)
IMM GRANULOCYTES NFR BLD AUTO: 0.3 % — SIGNIFICANT CHANGE UP (ref 0–0.9)
IMM GRANULOCYTES NFR BLD AUTO: 0.3 % — SIGNIFICANT CHANGE UP (ref 0–0.9)
LYMPHOCYTES # BLD AUTO: 4.6 K/UL — HIGH (ref 1–3.3)
LYMPHOCYTES # BLD AUTO: 4.6 K/UL — HIGH (ref 1–3.3)
LYMPHOCYTES # BLD AUTO: 42.3 % — SIGNIFICANT CHANGE UP (ref 13–44)
LYMPHOCYTES # BLD AUTO: 42.3 % — SIGNIFICANT CHANGE UP (ref 13–44)
MAGNESIUM SERPL-MCNC: 2 MG/DL — SIGNIFICANT CHANGE UP (ref 1.6–2.6)
MAGNESIUM SERPL-MCNC: 2 MG/DL — SIGNIFICANT CHANGE UP (ref 1.6–2.6)
MCHC RBC-ENTMCNC: 29.9 PG — SIGNIFICANT CHANGE UP (ref 27–34)
MCHC RBC-ENTMCNC: 29.9 PG — SIGNIFICANT CHANGE UP (ref 27–34)
MCHC RBC-ENTMCNC: 32.6 GM/DL — SIGNIFICANT CHANGE UP (ref 32–36)
MCHC RBC-ENTMCNC: 32.6 GM/DL — SIGNIFICANT CHANGE UP (ref 32–36)
MCV RBC AUTO: 91.7 FL — SIGNIFICANT CHANGE UP (ref 80–100)
MCV RBC AUTO: 91.7 FL — SIGNIFICANT CHANGE UP (ref 80–100)
MONOCYTES # BLD AUTO: 0.61 K/UL — SIGNIFICANT CHANGE UP (ref 0–0.9)
MONOCYTES # BLD AUTO: 0.61 K/UL — SIGNIFICANT CHANGE UP (ref 0–0.9)
MONOCYTES NFR BLD AUTO: 5.6 % — SIGNIFICANT CHANGE UP (ref 2–14)
MONOCYTES NFR BLD AUTO: 5.6 % — SIGNIFICANT CHANGE UP (ref 2–14)
NEUTROPHILS # BLD AUTO: 5.31 K/UL — SIGNIFICANT CHANGE UP (ref 1.8–7.4)
NEUTROPHILS # BLD AUTO: 5.31 K/UL — SIGNIFICANT CHANGE UP (ref 1.8–7.4)
NEUTROPHILS NFR BLD AUTO: 48.7 % — SIGNIFICANT CHANGE UP (ref 43–77)
NEUTROPHILS NFR BLD AUTO: 48.7 % — SIGNIFICANT CHANGE UP (ref 43–77)
NRBC # BLD: 0 /100 WBCS — SIGNIFICANT CHANGE UP (ref 0–0)
NRBC # BLD: 0 /100 WBCS — SIGNIFICANT CHANGE UP (ref 0–0)
NRBC # FLD: 0 K/UL — SIGNIFICANT CHANGE UP (ref 0–0)
NRBC # FLD: 0 K/UL — SIGNIFICANT CHANGE UP (ref 0–0)
PHOSPHATE SERPL-MCNC: 3.6 MG/DL — SIGNIFICANT CHANGE UP (ref 2.5–4.5)
PHOSPHATE SERPL-MCNC: 3.6 MG/DL — SIGNIFICANT CHANGE UP (ref 2.5–4.5)
PLATELET # BLD AUTO: 429 K/UL — HIGH (ref 150–400)
PLATELET # BLD AUTO: 429 K/UL — HIGH (ref 150–400)
POTASSIUM SERPL-MCNC: 3.9 MMOL/L — SIGNIFICANT CHANGE UP (ref 3.5–5.3)
POTASSIUM SERPL-MCNC: 3.9 MMOL/L — SIGNIFICANT CHANGE UP (ref 3.5–5.3)
POTASSIUM SERPL-SCNC: 3.9 MMOL/L — SIGNIFICANT CHANGE UP (ref 3.5–5.3)
POTASSIUM SERPL-SCNC: 3.9 MMOL/L — SIGNIFICANT CHANGE UP (ref 3.5–5.3)
RBC # BLD: 3.51 M/UL — LOW (ref 3.8–5.2)
RBC # BLD: 3.51 M/UL — LOW (ref 3.8–5.2)
RBC # FLD: 13.3 % — SIGNIFICANT CHANGE UP (ref 10.3–14.5)
RBC # FLD: 13.3 % — SIGNIFICANT CHANGE UP (ref 10.3–14.5)
SODIUM SERPL-SCNC: 136 MMOL/L — SIGNIFICANT CHANGE UP (ref 135–145)
SODIUM SERPL-SCNC: 136 MMOL/L — SIGNIFICANT CHANGE UP (ref 135–145)
TOTAL VOLUME - 24 HOUR: 1650 ML — SIGNIFICANT CHANGE UP
TOTAL VOLUME - 24 HOUR: 1650 ML — SIGNIFICANT CHANGE UP
URINE CREATININE CALCULATION: 1.2 G/24 H — SIGNIFICANT CHANGE UP (ref 0.6–1.6)
URINE CREATININE CALCULATION: 1.2 G/24 H — SIGNIFICANT CHANGE UP (ref 0.6–1.6)
WBC # BLD: 10.88 K/UL — HIGH (ref 3.8–10.5)
WBC # BLD: 10.88 K/UL — HIGH (ref 3.8–10.5)
WBC # FLD AUTO: 10.88 K/UL — HIGH (ref 3.8–10.5)
WBC # FLD AUTO: 10.88 K/UL — HIGH (ref 3.8–10.5)

## 2023-12-24 PROCEDURE — 99233 SBSQ HOSP IP/OBS HIGH 50: CPT

## 2023-12-24 RX ORDER — INSULIN LISPRO 100/ML
20 VIAL (ML) SUBCUTANEOUS
Refills: 0 | Status: DISCONTINUED | OUTPATIENT
Start: 2023-12-24 | End: 2023-12-26

## 2023-12-24 RX ORDER — LISINOPRIL 2.5 MG/1
5 TABLET ORAL DAILY
Refills: 0 | Status: DISCONTINUED | OUTPATIENT
Start: 2023-12-25 | End: 2023-12-29

## 2023-12-24 RX ORDER — DIPHENHYDRAMINE HCL 50 MG
25 CAPSULE ORAL ONCE
Refills: 0 | Status: COMPLETED | OUTPATIENT
Start: 2023-12-24 | End: 2023-12-24

## 2023-12-24 RX ADMIN — Medication 2: at 17:57

## 2023-12-24 RX ADMIN — INSULIN GLARGINE 50 UNIT(S): 100 INJECTION, SOLUTION SUBCUTANEOUS at 22:28

## 2023-12-24 RX ADMIN — Medication 25 MILLIGRAM(S): at 22:44

## 2023-12-24 RX ADMIN — Medication 10 MILLIGRAM(S): at 06:04

## 2023-12-24 RX ADMIN — OXYCODONE HYDROCHLORIDE 15 MILLIGRAM(S): 5 TABLET ORAL at 09:54

## 2023-12-24 RX ADMIN — ATORVASTATIN CALCIUM 20 MILLIGRAM(S): 80 TABLET, FILM COATED ORAL at 22:28

## 2023-12-24 RX ADMIN — PANTOPRAZOLE SODIUM 40 MILLIGRAM(S): 20 TABLET, DELAYED RELEASE ORAL at 12:37

## 2023-12-24 RX ADMIN — Medication 30 MILLIGRAM(S): at 06:03

## 2023-12-24 RX ADMIN — Medication 25 MILLIGRAM(S): at 12:36

## 2023-12-24 RX ADMIN — Medication 30 MILLIGRAM(S): at 07:33

## 2023-12-24 RX ADMIN — Medication 2: at 09:54

## 2023-12-24 RX ADMIN — OXYCODONE HYDROCHLORIDE 15 MILLIGRAM(S): 5 TABLET ORAL at 10:54

## 2023-12-24 RX ADMIN — Medication 10 MILLIGRAM(S): at 12:36

## 2023-12-24 RX ADMIN — Medication 18 UNIT(S): at 12:55

## 2023-12-24 RX ADMIN — ACETAZOLAMIDE 500 MILLIGRAM(S): 250 TABLET ORAL at 17:18

## 2023-12-24 RX ADMIN — Medication 4: at 12:55

## 2023-12-24 RX ADMIN — Medication 30 MILLIGRAM(S): at 13:36

## 2023-12-24 RX ADMIN — Medication 6 MILLIGRAM(S): at 02:18

## 2023-12-24 RX ADMIN — OXYCODONE HYDROCHLORIDE 15 MILLIGRAM(S): 5 TABLET ORAL at 18:16

## 2023-12-24 RX ADMIN — SPIRONOLACTONE 25 MILLIGRAM(S): 25 TABLET, FILM COATED ORAL at 06:03

## 2023-12-24 RX ADMIN — Medication 20 UNIT(S): at 17:58

## 2023-12-24 RX ADMIN — OXYCODONE HYDROCHLORIDE 15 MILLIGRAM(S): 5 TABLET ORAL at 17:18

## 2023-12-24 RX ADMIN — LISINOPRIL 10 MILLIGRAM(S): 2.5 TABLET ORAL at 06:03

## 2023-12-24 RX ADMIN — BUMETANIDE 2 MILLIGRAM(S): 0.25 INJECTION INTRAMUSCULAR; INTRAVENOUS at 06:03

## 2023-12-24 RX ADMIN — NALOXEGOL OXALATE 25 MILLIGRAM(S): 12.5 TABLET, FILM COATED ORAL at 12:37

## 2023-12-24 RX ADMIN — CARVEDILOL PHOSPHATE 3.12 MILLIGRAM(S): 80 CAPSULE, EXTENDED RELEASE ORAL at 17:19

## 2023-12-24 RX ADMIN — OXYCODONE HYDROCHLORIDE 15 MILLIGRAM(S): 5 TABLET ORAL at 03:36

## 2023-12-24 RX ADMIN — Medication 10 MILLIGRAM(S): at 22:30

## 2023-12-24 RX ADMIN — Medication 30 MILLIGRAM(S): at 22:28

## 2023-12-24 RX ADMIN — Medication 30 MILLIGRAM(S): at 12:36

## 2023-12-24 RX ADMIN — ACETAZOLAMIDE 500 MILLIGRAM(S): 250 TABLET ORAL at 06:03

## 2023-12-24 RX ADMIN — Medication 18 UNIT(S): at 09:53

## 2023-12-24 RX ADMIN — OXYCODONE HYDROCHLORIDE 15 MILLIGRAM(S): 5 TABLET ORAL at 04:36

## 2023-12-24 NOTE — PROGRESS NOTE ADULT - PROBLEM SELECTOR PLAN 2
Chronic HFpEF, euvolemic on exam   -Not in acute exacerbation   -c/w bumex 2 mg PO daily   -c/w lisinopril 10 mg   -c/w spironolactone 25 mg daily  -c/w coreg 3.125mg q12h Chronic HFpEF, euvolemic on exam   -Not in acute exacerbation   -c/w bumex 2 mg PO daily   -c/w lisinopril 5 mg   -c/w spironolactone 25 mg daily  -c/w coreg 3.125mg q12h

## 2023-12-24 NOTE — PROGRESS NOTE ADULT - PROBLEM SELECTOR PLAN 3
-HgbA1c 7.5  -appreciate endo recs - Lantus 50U qhs + Admelog 18U TID + mod SSI  - per endo r/o Cushing's disease - obesity, htn, DM2, easy bruising, hair loss, hirsutism, irregular menses  - 24 hours urine cortisol + creatinine collection in progress, once complete obtain 1mg dex suppression test

## 2023-12-24 NOTE — PROGRESS NOTE ADULT - SUBJECTIVE AND OBJECTIVE BOX
Belle Lewis MD  [facility] Division of Hospital Medicine  Pager [phone] (M-F 8AM-5PM)  Other Times: [id number]    Patient is a 53y old  Female who presents with a chief complaint of Headache, tinnitus (23 Dec 2023 14:44)    SUBJECTIVE / OVERNIGHT EVENTS: no acute events overnight     MEDICATIONS  (STANDING):  acetaZOLAMIDE    Tablet 500 milliGRAM(s) Oral two times a day  atorvastatin 20 milliGRAM(s) Oral at bedtime  buMETAnide 2 milliGRAM(s) Oral daily  carvedilol 3.125 milliGRAM(s) Oral every 12 hours  dextrose 5%. 1000 milliLiter(s) (50 mL/Hr) IV Continuous <Continuous>  dextrose 5%. 1000 milliLiter(s) (100 mL/Hr) IV Continuous <Continuous>  dextrose 50% Injectable 25 Gram(s) IV Push once  dextrose 50% Injectable 25 Gram(s) IV Push once  dextrose 50% Injectable 12.5 Gram(s) IV Push once  ergocalciferol 18782 Unit(s) Oral <User Schedule>  glucagon  Injectable 1 milliGRAM(s) IntraMuscular once  insulin glargine Injectable (LANTUS) 50 Unit(s) SubCutaneous at bedtime  insulin lispro (ADMELOG) corrective regimen sliding scale   SubCutaneous three times a day before meals  insulin lispro (ADMELOG) corrective regimen sliding scale   SubCutaneous at bedtime  insulin lispro Injectable (ADMELOG) 18 Unit(s) SubCutaneous three times a day before meals  ketorolac   Injectable 30 milliGRAM(s) IV Push every 8 hours  metoclopramide Injectable 10 milliGRAM(s) IV Push every 8 hours  naloxegol 25 milliGRAM(s) Oral daily  pantoprazole   Suspension 40 milliGRAM(s) Oral daily  sodium chloride 0.9%. 1000 milliLiter(s) (75 mL/Hr) IV Continuous <Continuous>  spironolactone 25 milliGRAM(s) Oral daily    MEDICATIONS  (PRN):  acetaminophen 325 mG/butalbital 50 mG/caffeine 40 mG 2 Tablet(s) Oral every 8 hours PRN migraine  aluminum hydroxide/magnesium hydroxide/simethicone Suspension 30 milliLiter(s) Oral every 4 hours PRN Dyspepsia  baclofen 5 milliGRAM(s) Oral every 8 hours PRN Muscle Spasm  dextrose Oral Gel 15 Gram(s) Oral once PRN Blood Glucose LESS THAN 70 milliGRAM(s)/deciliter  melatonin 6 milliGRAM(s) Oral at bedtime PRN Sleep  ondansetron Injectable 4 milliGRAM(s) IV Push every 8 hours PRN Nausea and/or Vomiting  oxyCODONE    IR 15 milliGRAM(s) Oral every 6 hours PRN Moderate Pain (4 - 6)      PHYSICAL EXAM:  Vital Signs Last 24 Hrs  T(C): 36.4 (24 Dec 2023 11:37), Max: 36.8 (23 Dec 2023 19:39)  T(F): 97.6 (24 Dec 2023 11:37), Max: 98.2 (23 Dec 2023 19:39)  HR: 64 (24 Dec 2023 11:37) (55 - 73)  BP: 97/52 (24 Dec 2023 11:37) (97/52 - 123/63)  RR: 18 (24 Dec 2023 11:37) (17 - 18)  SpO2: 100% (24 Dec 2023 11:37) (100% - 100%)    Parameters below as of 24 Dec 2023 11:37  Patient On (Oxygen Delivery Method): room air        CONSTITUTIONAL: NAD, well-developed, well-groomed  RESPIRATORY: Normal respiratory effort; lungs are clear to auscultation bilaterally  CARDIOVASCULAR: Regular rate and rhythm, normal S1 and S2, no murmur/rub/gallop; No lower extremity edema  GASTROINTESTINAL: Nontender to palpation, normoactive bowel sounds, no rebound/guarding; No hepatosplenomegaly  MUSCULOSKELETAL:  no clubbing or cyanosis of digits; no joint swelling or tenderness to palpation  NEUROLOGY: non-focal; no gross sensory deficits   PSYCH: A+O to person, place, and time; affect appropriate  SKIN: No rashes; warm     LABS:                        10.5   10.88 )-----------( 429      ( 24 Dec 2023 07:18 )             32.2     12-24    136  |  107  |  28<H>  ----------------------------<  181<H>  3.9   |  18<L>  |  0.94    Ca    8.1<L>      24 Dec 2023 07:18  Phos  3.6     12-24  Mg     2.00     12-24    Urinalysis Basic - ( 24 Dec 2023 07:18 )    Color: x / Appearance: x / SG: x / pH: x  Gluc: 181 mg/dL / Ketone: x  / Bili: x / Urobili: x   Blood: x / Protein: x / Nitrite: x   Leuk Esterase: x / RBC: x / WBC x   Sq Epi: x / Non Sq Epi: x / Bacteria: x          RADIOLOGY & ADDITIONAL TESTS:  Results Reviewed:   Imaging Personally Reviewed:  Electrocardiogram Personally Reviewed:    COORDINATION OF CARE:  Care Discussed with Consultants/Other Providers [Y/N]:  Prior or Outpatient Records Reviewed [Y/N]:   Belle Lewis MD  [facility] Division of Hospital Medicine  Pager [phone] (M-F 8AM-5PM)  Other Times: [phone]    Patient is a 53y old  Female who presents with a chief complaint of Headache, tinnitus (23 Dec 2023 14:44)    SUBJECTIVE / OVERNIGHT EVENTS: no acute events overnight     MEDICATIONS  (STANDING):  acetaZOLAMIDE    Tablet 500 milliGRAM(s) Oral two times a day  atorvastatin 20 milliGRAM(s) Oral at bedtime  buMETAnide 2 milliGRAM(s) Oral daily  carvedilol 3.125 milliGRAM(s) Oral every 12 hours  dextrose 5%. 1000 milliLiter(s) (50 mL/Hr) IV Continuous <Continuous>  dextrose 5%. 1000 milliLiter(s) (100 mL/Hr) IV Continuous <Continuous>  dextrose 50% Injectable 25 Gram(s) IV Push once  dextrose 50% Injectable 25 Gram(s) IV Push once  dextrose 50% Injectable 12.5 Gram(s) IV Push once  ergocalciferol 92325 Unit(s) Oral <User Schedule>  glucagon  Injectable 1 milliGRAM(s) IntraMuscular once  insulin glargine Injectable (LANTUS) 50 Unit(s) SubCutaneous at bedtime  insulin lispro (ADMELOG) corrective regimen sliding scale   SubCutaneous three times a day before meals  insulin lispro (ADMELOG) corrective regimen sliding scale   SubCutaneous at bedtime  insulin lispro Injectable (ADMELOG) 18 Unit(s) SubCutaneous three times a day before meals  ketorolac   Injectable 30 milliGRAM(s) IV Push every 8 hours  metoclopramide Injectable 10 milliGRAM(s) IV Push every 8 hours  naloxegol 25 milliGRAM(s) Oral daily  pantoprazole   Suspension 40 milliGRAM(s) Oral daily  sodium chloride 0.9%. 1000 milliLiter(s) (75 mL/Hr) IV Continuous <Continuous>  spironolactone 25 milliGRAM(s) Oral daily    MEDICATIONS  (PRN):  acetaminophen 325 mG/butalbital 50 mG/caffeine 40 mG 2 Tablet(s) Oral every 8 hours PRN migraine  aluminum hydroxide/magnesium hydroxide/simethicone Suspension 30 milliLiter(s) Oral every 4 hours PRN Dyspepsia  baclofen 5 milliGRAM(s) Oral every 8 hours PRN Muscle Spasm  dextrose Oral Gel 15 Gram(s) Oral once PRN Blood Glucose LESS THAN 70 milliGRAM(s)/deciliter  melatonin 6 milliGRAM(s) Oral at bedtime PRN Sleep  ondansetron Injectable 4 milliGRAM(s) IV Push every 8 hours PRN Nausea and/or Vomiting  oxyCODONE    IR 15 milliGRAM(s) Oral every 6 hours PRN Moderate Pain (4 - 6)      PHYSICAL EXAM:  Vital Signs Last 24 Hrs  T(C): 36.4 (24 Dec 2023 11:37), Max: 36.8 (23 Dec 2023 19:39)  T(F): 97.6 (24 Dec 2023 11:37), Max: 98.2 (23 Dec 2023 19:39)  HR: 64 (24 Dec 2023 11:37) (55 - 73)  BP: 97/52 (24 Dec 2023 11:37) (97/52 - 123/63)  RR: 18 (24 Dec 2023 11:37) (17 - 18)  SpO2: 100% (24 Dec 2023 11:37) (100% - 100%)    Parameters below as of 24 Dec 2023 11:37  Patient On (Oxygen Delivery Method): room air        CONSTITUTIONAL: NAD, well-developed, well-groomed  RESPIRATORY: Normal respiratory effort; lungs are clear to auscultation bilaterally  CARDIOVASCULAR: Regular rate and rhythm, normal S1 and S2, no murmur/rub/gallop; No lower extremity edema  GASTROINTESTINAL: Nontender to palpation, normoactive bowel sounds, no rebound/guarding; No hepatosplenomegaly  MUSCULOSKELETAL:  no clubbing or cyanosis of digits; no joint swelling or tenderness to palpation  NEUROLOGY: non-focal; no gross sensory deficits   PSYCH: A+O to person, place, and time; affect appropriate  SKIN: No rashes; warm     LABS:                        10.5   10.88 )-----------( 429      ( 24 Dec 2023 07:18 )             32.2     12-24    136  |  107  |  28<H>  ----------------------------<  181<H>  3.9   |  18<L>  |  0.94    Ca    8.1<L>      24 Dec 2023 07:18  Phos  3.6     12-24  Mg     2.00     12-24    Urinalysis Basic - ( 24 Dec 2023 07:18 )    Color: x / Appearance: x / SG: x / pH: x  Gluc: 181 mg/dL / Ketone: x  / Bili: x / Urobili: x   Blood: x / Protein: x / Nitrite: x   Leuk Esterase: x / RBC: x / WBC x   Sq Epi: x / Non Sq Epi: x / Bacteria: x          RADIOLOGY & ADDITIONAL TESTS:  Results Reviewed:   Imaging Personally Reviewed:  Electrocardiogram Personally Reviewed:    COORDINATION OF CARE:  Care Discussed with Consultants/Other Providers [Y/N]:  Prior or Outpatient Records Reviewed [Y/N]:

## 2023-12-24 NOTE — PROGRESS NOTE ADULT - PROBLEM SELECTOR PLAN 4
-C/w lisinopril 10 mg daily   -C/w spironolactone 25 mg daily   -C/w on coreg 3.125mg q12h -C/w lisinopril 5 mg daily   -C/w spironolactone 25 mg daily   -C/w on coreg 3.125mg q12h

## 2023-12-24 NOTE — CHART NOTE - NSCHARTNOTEFT_GEN_A_CORE
Brief Endocrinology Chart Note:    Glucose trend from overnight reviewed    Patient also had urine cortisol sent after 1 hr collection. Was started over this morning    Recommendations:  #DM2  -increase admelog to 20 units before meals  -c/w lantus 50 units nightly  -start moderate admelog correction scales before meals  -moderate admelog correction scales at bedtime  -carb consistent diet  -FSG before meals and before bedtime  -target glucose 100-180mg/dL  -hypoglycemia protocol in place if needed    Endocrinology will continue to follow    Please call if any questions    Artis Copeland MD  Endocrine Fellow  Can be reached via teams. For follow up questions, discharge recommendations, or new consults, please call answering service at [phone] (weekdays); [phone] (nights/weekends) Brief Endocrinology Chart Note:    Glucose trend from overnight reviewed    Patient also had urine cortisol sent after 1 hr collection. Was started over this morning    Recommendations:  #DM2  -increase admelog to 20 units before meals  -c/w lantus 50 units nightly  -start moderate admelog correction scales before meals  -moderate admelog correction scales at bedtime  -carb consistent diet  -FSG before meals and before bedtime  -target glucose 100-180mg/dL  -hypoglycemia protocol in place if needed    Endocrinology will continue to follow    Please call if any questions    Artis Copeland MD  Endocrine Fellow  Can be reached via teams. For follow up questions, discharge recommendations, or new consults, please call answering service at [phone] (weekdays); [phone] (nights/weekends)

## 2023-12-25 LAB
ANION GAP SERPL CALC-SCNC: 11 MMOL/L — SIGNIFICANT CHANGE UP (ref 7–14)
ANION GAP SERPL CALC-SCNC: 11 MMOL/L — SIGNIFICANT CHANGE UP (ref 7–14)
BASOPHILS # BLD AUTO: 0.03 K/UL — SIGNIFICANT CHANGE UP (ref 0–0.2)
BASOPHILS # BLD AUTO: 0.03 K/UL — SIGNIFICANT CHANGE UP (ref 0–0.2)
BASOPHILS NFR BLD AUTO: 0.3 % — SIGNIFICANT CHANGE UP (ref 0–2)
BASOPHILS NFR BLD AUTO: 0.3 % — SIGNIFICANT CHANGE UP (ref 0–2)
BUN SERPL-MCNC: 27 MG/DL — HIGH (ref 7–23)
BUN SERPL-MCNC: 27 MG/DL — HIGH (ref 7–23)
CALCIUM SERPL-MCNC: 8.4 MG/DL — SIGNIFICANT CHANGE UP (ref 8.4–10.5)
CALCIUM SERPL-MCNC: 8.4 MG/DL — SIGNIFICANT CHANGE UP (ref 8.4–10.5)
CHLORIDE SERPL-SCNC: 109 MMOL/L — HIGH (ref 98–107)
CHLORIDE SERPL-SCNC: 109 MMOL/L — HIGH (ref 98–107)
CO2 SERPL-SCNC: 18 MMOL/L — LOW (ref 22–31)
CO2 SERPL-SCNC: 18 MMOL/L — LOW (ref 22–31)
CREAT SERPL-MCNC: 0.97 MG/DL — SIGNIFICANT CHANGE UP (ref 0.5–1.3)
CREAT SERPL-MCNC: 0.97 MG/DL — SIGNIFICANT CHANGE UP (ref 0.5–1.3)
EGFR: 70 ML/MIN/1.73M2 — SIGNIFICANT CHANGE UP
EGFR: 70 ML/MIN/1.73M2 — SIGNIFICANT CHANGE UP
EOSINOPHIL # BLD AUTO: 0.28 K/UL — SIGNIFICANT CHANGE UP (ref 0–0.5)
EOSINOPHIL # BLD AUTO: 0.28 K/UL — SIGNIFICANT CHANGE UP (ref 0–0.5)
EOSINOPHIL NFR BLD AUTO: 2.6 % — SIGNIFICANT CHANGE UP (ref 0–6)
EOSINOPHIL NFR BLD AUTO: 2.6 % — SIGNIFICANT CHANGE UP (ref 0–6)
GLUCOSE BLDC GLUCOMTR-MCNC: 106 MG/DL — HIGH (ref 70–99)
GLUCOSE BLDC GLUCOMTR-MCNC: 106 MG/DL — HIGH (ref 70–99)
GLUCOSE BLDC GLUCOMTR-MCNC: 138 MG/DL — HIGH (ref 70–99)
GLUCOSE BLDC GLUCOMTR-MCNC: 138 MG/DL — HIGH (ref 70–99)
GLUCOSE BLDC GLUCOMTR-MCNC: 210 MG/DL — HIGH (ref 70–99)
GLUCOSE BLDC GLUCOMTR-MCNC: 210 MG/DL — HIGH (ref 70–99)
GLUCOSE BLDC GLUCOMTR-MCNC: 224 MG/DL — HIGH (ref 70–99)
GLUCOSE BLDC GLUCOMTR-MCNC: 224 MG/DL — HIGH (ref 70–99)
GLUCOSE SERPL-MCNC: 234 MG/DL — HIGH (ref 70–99)
GLUCOSE SERPL-MCNC: 234 MG/DL — HIGH (ref 70–99)
HCT VFR BLD CALC: 31.5 % — LOW (ref 34.5–45)
HCT VFR BLD CALC: 31.5 % — LOW (ref 34.5–45)
HGB BLD-MCNC: 10.3 G/DL — LOW (ref 11.5–15.5)
HGB BLD-MCNC: 10.3 G/DL — LOW (ref 11.5–15.5)
IANC: 5.64 K/UL — SIGNIFICANT CHANGE UP (ref 1.8–7.4)
IANC: 5.64 K/UL — SIGNIFICANT CHANGE UP (ref 1.8–7.4)
IMM GRANULOCYTES NFR BLD AUTO: 0.4 % — SIGNIFICANT CHANGE UP (ref 0–0.9)
IMM GRANULOCYTES NFR BLD AUTO: 0.4 % — SIGNIFICANT CHANGE UP (ref 0–0.9)
LYMPHOCYTES # BLD AUTO: 38.9 % — SIGNIFICANT CHANGE UP (ref 13–44)
LYMPHOCYTES # BLD AUTO: 38.9 % — SIGNIFICANT CHANGE UP (ref 13–44)
LYMPHOCYTES # BLD AUTO: 4.2 K/UL — HIGH (ref 1–3.3)
LYMPHOCYTES # BLD AUTO: 4.2 K/UL — HIGH (ref 1–3.3)
MAGNESIUM SERPL-MCNC: 2 MG/DL — SIGNIFICANT CHANGE UP (ref 1.6–2.6)
MAGNESIUM SERPL-MCNC: 2 MG/DL — SIGNIFICANT CHANGE UP (ref 1.6–2.6)
MCHC RBC-ENTMCNC: 29.9 PG — SIGNIFICANT CHANGE UP (ref 27–34)
MCHC RBC-ENTMCNC: 29.9 PG — SIGNIFICANT CHANGE UP (ref 27–34)
MCHC RBC-ENTMCNC: 32.7 GM/DL — SIGNIFICANT CHANGE UP (ref 32–36)
MCHC RBC-ENTMCNC: 32.7 GM/DL — SIGNIFICANT CHANGE UP (ref 32–36)
MCV RBC AUTO: 91.6 FL — SIGNIFICANT CHANGE UP (ref 80–100)
MCV RBC AUTO: 91.6 FL — SIGNIFICANT CHANGE UP (ref 80–100)
MONOCYTES # BLD AUTO: 0.6 K/UL — SIGNIFICANT CHANGE UP (ref 0–0.9)
MONOCYTES # BLD AUTO: 0.6 K/UL — SIGNIFICANT CHANGE UP (ref 0–0.9)
MONOCYTES NFR BLD AUTO: 5.6 % — SIGNIFICANT CHANGE UP (ref 2–14)
MONOCYTES NFR BLD AUTO: 5.6 % — SIGNIFICANT CHANGE UP (ref 2–14)
NEUTROPHILS # BLD AUTO: 5.64 K/UL — SIGNIFICANT CHANGE UP (ref 1.8–7.4)
NEUTROPHILS # BLD AUTO: 5.64 K/UL — SIGNIFICANT CHANGE UP (ref 1.8–7.4)
NEUTROPHILS NFR BLD AUTO: 52.2 % — SIGNIFICANT CHANGE UP (ref 43–77)
NEUTROPHILS NFR BLD AUTO: 52.2 % — SIGNIFICANT CHANGE UP (ref 43–77)
NRBC # BLD: 0 /100 WBCS — SIGNIFICANT CHANGE UP (ref 0–0)
NRBC # BLD: 0 /100 WBCS — SIGNIFICANT CHANGE UP (ref 0–0)
NRBC # FLD: 0 K/UL — SIGNIFICANT CHANGE UP (ref 0–0)
NRBC # FLD: 0 K/UL — SIGNIFICANT CHANGE UP (ref 0–0)
PHOSPHATE SERPL-MCNC: 3.5 MG/DL — SIGNIFICANT CHANGE UP (ref 2.5–4.5)
PHOSPHATE SERPL-MCNC: 3.5 MG/DL — SIGNIFICANT CHANGE UP (ref 2.5–4.5)
PLATELET # BLD AUTO: 301 K/UL — SIGNIFICANT CHANGE UP (ref 150–400)
PLATELET # BLD AUTO: 301 K/UL — SIGNIFICANT CHANGE UP (ref 150–400)
POTASSIUM SERPL-MCNC: 4.2 MMOL/L — SIGNIFICANT CHANGE UP (ref 3.5–5.3)
POTASSIUM SERPL-MCNC: 4.2 MMOL/L — SIGNIFICANT CHANGE UP (ref 3.5–5.3)
POTASSIUM SERPL-SCNC: 4.2 MMOL/L — SIGNIFICANT CHANGE UP (ref 3.5–5.3)
POTASSIUM SERPL-SCNC: 4.2 MMOL/L — SIGNIFICANT CHANGE UP (ref 3.5–5.3)
RBC # BLD: 3.44 M/UL — LOW (ref 3.8–5.2)
RBC # BLD: 3.44 M/UL — LOW (ref 3.8–5.2)
RBC # FLD: 13.4 % — SIGNIFICANT CHANGE UP (ref 10.3–14.5)
RBC # FLD: 13.4 % — SIGNIFICANT CHANGE UP (ref 10.3–14.5)
SODIUM SERPL-SCNC: 138 MMOL/L — SIGNIFICANT CHANGE UP (ref 135–145)
SODIUM SERPL-SCNC: 138 MMOL/L — SIGNIFICANT CHANGE UP (ref 135–145)
WBC # BLD: 10.79 K/UL — HIGH (ref 3.8–10.5)
WBC # BLD: 10.79 K/UL — HIGH (ref 3.8–10.5)
WBC # FLD AUTO: 10.79 K/UL — HIGH (ref 3.8–10.5)
WBC # FLD AUTO: 10.79 K/UL — HIGH (ref 3.8–10.5)

## 2023-12-25 PROCEDURE — 99232 SBSQ HOSP IP/OBS MODERATE 35: CPT

## 2023-12-25 RX ORDER — DEXAMETHASONE 0.5 MG/5ML
1 ELIXIR ORAL ONCE
Refills: 0 | Status: COMPLETED | OUTPATIENT
Start: 2023-12-25 | End: 2023-12-25

## 2023-12-25 RX ORDER — DIPHENHYDRAMINE HCL 50 MG
25 CAPSULE ORAL ONCE
Refills: 0 | Status: COMPLETED | OUTPATIENT
Start: 2023-12-25 | End: 2023-12-25

## 2023-12-25 RX ORDER — METOCLOPRAMIDE HCL 10 MG
10 TABLET ORAL EVERY 8 HOURS
Refills: 0 | Status: COMPLETED | OUTPATIENT
Start: 2023-12-25 | End: 2023-12-26

## 2023-12-25 RX ORDER — INSULIN GLARGINE 100 [IU]/ML
55 INJECTION, SOLUTION SUBCUTANEOUS AT BEDTIME
Refills: 0 | Status: DISCONTINUED | OUTPATIENT
Start: 2023-12-25 | End: 2023-12-26

## 2023-12-25 RX ORDER — PANTOPRAZOLE SODIUM 20 MG/1
40 TABLET, DELAYED RELEASE ORAL
Refills: 0 | Status: DISCONTINUED | OUTPATIENT
Start: 2023-12-26 | End: 2023-12-29

## 2023-12-25 RX ORDER — KETOROLAC TROMETHAMINE 30 MG/ML
30 SYRINGE (ML) INJECTION EVERY 8 HOURS
Refills: 0 | Status: DISCONTINUED | OUTPATIENT
Start: 2023-12-25 | End: 2023-12-26

## 2023-12-25 RX ADMIN — Medication 20 UNIT(S): at 13:01

## 2023-12-25 RX ADMIN — OXYCODONE HYDROCHLORIDE 15 MILLIGRAM(S): 5 TABLET ORAL at 18:23

## 2023-12-25 RX ADMIN — Medication 20 UNIT(S): at 09:20

## 2023-12-25 RX ADMIN — Medication 10 MILLIGRAM(S): at 21:46

## 2023-12-25 RX ADMIN — Medication 6 MILLIGRAM(S): at 23:33

## 2023-12-25 RX ADMIN — Medication 4: at 09:20

## 2023-12-25 RX ADMIN — ATORVASTATIN CALCIUM 20 MILLIGRAM(S): 80 TABLET, FILM COATED ORAL at 21:45

## 2023-12-25 RX ADMIN — Medication 1 MILLIGRAM(S): at 23:50

## 2023-12-25 RX ADMIN — Medication 25 MILLIGRAM(S): at 11:58

## 2023-12-25 RX ADMIN — Medication 4: at 17:46

## 2023-12-25 RX ADMIN — OXYCODONE HYDROCHLORIDE 15 MILLIGRAM(S): 5 TABLET ORAL at 17:23

## 2023-12-25 RX ADMIN — Medication 30 MILLIGRAM(S): at 07:06

## 2023-12-25 RX ADMIN — Medication 30 MILLIGRAM(S): at 11:58

## 2023-12-25 RX ADMIN — Medication 10 MILLIGRAM(S): at 07:06

## 2023-12-25 RX ADMIN — SPIRONOLACTONE 25 MILLIGRAM(S): 25 TABLET, FILM COATED ORAL at 07:07

## 2023-12-25 RX ADMIN — Medication 30 MILLIGRAM(S): at 21:46

## 2023-12-25 RX ADMIN — Medication 20 UNIT(S): at 17:47

## 2023-12-25 RX ADMIN — Medication 30 MILLIGRAM(S): at 12:58

## 2023-12-25 RX ADMIN — ACETAZOLAMIDE 500 MILLIGRAM(S): 250 TABLET ORAL at 17:25

## 2023-12-25 RX ADMIN — CARVEDILOL PHOSPHATE 3.12 MILLIGRAM(S): 80 CAPSULE, EXTENDED RELEASE ORAL at 07:07

## 2023-12-25 RX ADMIN — PANTOPRAZOLE SODIUM 40 MILLIGRAM(S): 20 TABLET, DELAYED RELEASE ORAL at 11:58

## 2023-12-25 RX ADMIN — ACETAZOLAMIDE 500 MILLIGRAM(S): 250 TABLET ORAL at 07:08

## 2023-12-25 RX ADMIN — OXYCODONE HYDROCHLORIDE 15 MILLIGRAM(S): 5 TABLET ORAL at 23:34

## 2023-12-25 RX ADMIN — BUMETANIDE 2 MILLIGRAM(S): 0.25 INJECTION INTRAMUSCULAR; INTRAVENOUS at 07:08

## 2023-12-25 RX ADMIN — INSULIN GLARGINE 55 UNIT(S): 100 INJECTION, SOLUTION SUBCUTANEOUS at 21:45

## 2023-12-25 RX ADMIN — Medication 10 MILLIGRAM(S): at 11:58

## 2023-12-25 RX ADMIN — CARVEDILOL PHOSPHATE 3.12 MILLIGRAM(S): 80 CAPSULE, EXTENDED RELEASE ORAL at 17:25

## 2023-12-25 RX ADMIN — LISINOPRIL 5 MILLIGRAM(S): 2.5 TABLET ORAL at 07:11

## 2023-12-25 RX ADMIN — NALOXEGOL OXALATE 25 MILLIGRAM(S): 12.5 TABLET, FILM COATED ORAL at 11:57

## 2023-12-25 RX ADMIN — Medication 30 MILLIGRAM(S): at 22:46

## 2023-12-25 RX ADMIN — Medication 25 MILLIGRAM(S): at 07:01

## 2023-12-25 NOTE — PROGRESS NOTE ADULT - PROBLEM SELECTOR PLAN 3
-HgbA1c 7.5  -appreciate endo recs - Lantus 50U qhs + Admelog 18U TID + mod SSI  - per endo r/o Cushing's disease - obesity, htn, DM2, easy bruising, hair loss, hirsutism, irregular menses  - 24 hours urine cortisol collected (result pending), 1mg dex suppression test tonight per endo -HgbA1c 7.5  -appreciate endo recs - Lantus 55U qhs + Admelog 20U TID + mod SSI  - per endo r/o Cushing's disease - obesity, htn, DM2, easy bruising, hair loss, hirsutism, irregular menses  - 24 hours urine cortisol collected (result pending), 1mg dex suppression test tonight per endo

## 2023-12-25 NOTE — PROGRESS NOTE ADULT - PROBLEM SELECTOR PLAN 2
Chronic HFpEF, euvolemic on exam   -Not in acute exacerbation   -c/w bumex 2 mg PO daily   -c/w lisinopril 5 mg   -c/w spironolactone 25 mg daily  -c/w coreg 3.125mg q12h

## 2023-12-25 NOTE — CHART NOTE - NSCHARTNOTEFT_GEN_A_CORE
Brief Endocrinology Note    Glucose trends reviewed    Patient's 24 hr urine cortisol was sent without urine creatinine    Recommendations:  #DM2   -increase lantus to 55 units tonight given glucose na 196 to 224 overnight  -c/w admelog 20 units before meals  -moderate admelog correction scales before meals and bedtime  -carb consistent diet    #r/o cushings  -f/u 24 hr urine collection  -can perform 1mg dexamethasone suppression test tonight   -give 1mg dexamethasone between 11PM and midnight  -check 8AM serum cortisol and serum dexamethasone level  -cortisol should suppress to <1.8 in patient's without hypercortisolism    Endocrinology will continue to follow    Artis Copeland MD  Endocrine Fellow  Can be reached via teams. For follow up questions, discharge recommendations, or new consults, please call answering service at [phone] (weekdays); [phone] (nights/weekends) Brief Endocrinology Note    Glucose trends reviewed    Patient's 24 hr urine cortisol was sent without urine creatinine    Recommendations:  #DM2   -increase lantus to 55 units tonight given glucose na 196 to 224 overnight  -c/w admelog 20 units before meals  -moderate admelog correction scales before meals and bedtime  -carb consistent diet    #r/o cushings  -f/u 24 hr urine collection  -can perform 1mg dexamethasone suppression test tonight   -give 1mg dexamethasone between 11PM and midnight  -check 8AM serum cortisol and serum dexamethasone level  -cortisol should suppress to <1.8 in patient's without hypercortisolism    Endocrinology will continue to follow    Artis Copeland MD  Endocrine Fellow  Can be reached via teams. For follow up questions, discharge recommendations, or new consults, please call answering service at [phone] (weekdays); [phone] (nights/weekends)

## 2023-12-25 NOTE — PROGRESS NOTE ADULT - SUBJECTIVE AND OBJECTIVE BOX
Belle Lewis MD  [facility] Division of Hospital Medicine  Pager [phone] (M-F 8AM-5PM)  Other Times: [phone]    Patient is a 53y old  Female who presents with a chief complaint of Headache, tinnitus (24 Dec 2023 13:21)    SUBJECTIVE / OVERNIGHT EVENTS: was able to sleep overnight     MEDICATIONS  (STANDING):  acetaZOLAMIDE    Tablet 500 milliGRAM(s) Oral two times a day  atorvastatin 20 milliGRAM(s) Oral at bedtime  buMETAnide 2 milliGRAM(s) Oral daily  carvedilol 3.125 milliGRAM(s) Oral every 12 hours  dextrose 50% Injectable 25 Gram(s) IV Push once  ergocalciferol 21997 Unit(s) Oral <User Schedule>  insulin glargine Injectable (LANTUS) 55 Unit(s) SubCutaneous at bedtime  insulin lispro (ADMELOG) corrective regimen sliding scale   SubCutaneous three times a day before meals  insulin lispro (ADMELOG) corrective regimen sliding scale   SubCutaneous at bedtime  insulin lispro Injectable (ADMELOG) 20 Unit(s) SubCutaneous three times a day before meals  ketorolac   Injectable 30 milliGRAM(s) IV Push every 8 hours  lisinopril 5 milliGRAM(s) Oral daily  metoclopramide Injectable 10 milliGRAM(s) IV Push every 8 hours  naloxegol 25 milliGRAM(s) Oral daily  spironolactone 25 milliGRAM(s) Oral daily    MEDICATIONS  (PRN):  acetaminophen 325 mG/butalbital 50 mG/caffeine 40 mG 2 Tablet(s) Oral every 8 hours PRN migraine  aluminum hydroxide/magnesium hydroxide/simethicone Suspension 30 milliLiter(s) Oral every 4 hours PRN Dyspepsia  baclofen 5 milliGRAM(s) Oral every 8 hours PRN Muscle Spasm  melatonin 6 milliGRAM(s) Oral at bedtime PRN Sleep  ondansetron Injectable 4 milliGRAM(s) IV Push every 8 hours PRN Nausea and/or Vomiting  oxyCODONE    IR 15 milliGRAM(s) Oral every 6 hours PRN Moderate Pain (4 - 6)      PHYSICAL EXAM:  Vital Signs Last 24 Hrs  T(C): 36.9 (25 Dec 2023 07:00), Max: 36.9 (25 Dec 2023 07:00)  T(F): 98.5 (25 Dec 2023 07:00), Max: 98.5 (25 Dec 2023 07:00)  HR: 63 (25 Dec 2023 07:00) (63 - 70)  BP: 123/60 (25 Dec 2023 07:00) (104/53 - 127/56)  BP(mean): --  RR: 18 (25 Dec 2023 07:00) (17 - 18)  SpO2: 100% (25 Dec 2023 07:00) (100% - 100%)    Parameters below as of 25 Dec 2023 07:00  Patient On (Oxygen Delivery Method): room air        CONSTITUTIONAL: NAD, well-developed, well-groomed  RESPIRATORY: Normal respiratory effort; lungs are clear to auscultation bilaterally  CARDIOVASCULAR: Regular rate and rhythm, normal S1 and S2, no murmur/rub/gallop; No lower extremity edema  GASTROINTESTINAL: Nontender to palpation, normoactive bowel sounds, no rebound/guarding; No hepatosplenomegaly  MUSCULOSKELETAL:  no clubbing or cyanosis of digits; no joint swelling or tenderness to palpation  NEUROLOGY: non-focal; no gross sensory deficits   PSYCH: A+O to person, place, and time; affect appropriate  SKIN: No rashes; warm     LABS:                        10.3   10.79 )-----------( 301      ( 25 Dec 2023 06:54 )             31.5     12-25    138  |  109<H>  |  27<H>  ----------------------------<  234<H>  4.2   |  18<L>  |  0.97    Ca    8.4      25 Dec 2023 06:54  Phos  3.5     12-25  Mg     2.00     12-25            Urinalysis Basic - ( 25 Dec 2023 06:54 )    Color: x / Appearance: x / SG: x / pH: x  Gluc: 234 mg/dL / Ketone: x  / Bili: x / Urobili: x   Blood: x / Protein: x / Nitrite: x   Leuk Esterase: x / RBC: x / WBC x   Sq Epi: x / Non Sq Epi: x / Bacteria: x          RADIOLOGY & ADDITIONAL TESTS:  Results Reviewed:   Imaging Personally Reviewed:  Electrocardiogram Personally Reviewed:    COORDINATION OF CARE:  Care Discussed with Consultants/Other Providers [Y/N]:  Prior or Outpatient Records Reviewed [Y/N]:   Belle Lewis MD  [facility] Division of Hospital Medicine  Pager [phone] (M-F 8AM-5PM)  Other Times: [id number]    Patient is a 53y old  Female who presents with a chief complaint of Headache, tinnitus (24 Dec 2023 13:21)    SUBJECTIVE / OVERNIGHT EVENTS: was able to sleep overnight     MEDICATIONS  (STANDING):  acetaZOLAMIDE    Tablet 500 milliGRAM(s) Oral two times a day  atorvastatin 20 milliGRAM(s) Oral at bedtime  buMETAnide 2 milliGRAM(s) Oral daily  carvedilol 3.125 milliGRAM(s) Oral every 12 hours  dextrose 50% Injectable 25 Gram(s) IV Push once  ergocalciferol 36920 Unit(s) Oral <User Schedule>  insulin glargine Injectable (LANTUS) 55 Unit(s) SubCutaneous at bedtime  insulin lispro (ADMELOG) corrective regimen sliding scale   SubCutaneous three times a day before meals  insulin lispro (ADMELOG) corrective regimen sliding scale   SubCutaneous at bedtime  insulin lispro Injectable (ADMELOG) 20 Unit(s) SubCutaneous three times a day before meals  ketorolac   Injectable 30 milliGRAM(s) IV Push every 8 hours  lisinopril 5 milliGRAM(s) Oral daily  metoclopramide Injectable 10 milliGRAM(s) IV Push every 8 hours  naloxegol 25 milliGRAM(s) Oral daily  spironolactone 25 milliGRAM(s) Oral daily    MEDICATIONS  (PRN):  acetaminophen 325 mG/butalbital 50 mG/caffeine 40 mG 2 Tablet(s) Oral every 8 hours PRN migraine  aluminum hydroxide/magnesium hydroxide/simethicone Suspension 30 milliLiter(s) Oral every 4 hours PRN Dyspepsia  baclofen 5 milliGRAM(s) Oral every 8 hours PRN Muscle Spasm  melatonin 6 milliGRAM(s) Oral at bedtime PRN Sleep  ondansetron Injectable 4 milliGRAM(s) IV Push every 8 hours PRN Nausea and/or Vomiting  oxyCODONE    IR 15 milliGRAM(s) Oral every 6 hours PRN Moderate Pain (4 - 6)      PHYSICAL EXAM:  Vital Signs Last 24 Hrs  T(C): 36.9 (25 Dec 2023 07:00), Max: 36.9 (25 Dec 2023 07:00)  T(F): 98.5 (25 Dec 2023 07:00), Max: 98.5 (25 Dec 2023 07:00)  HR: 63 (25 Dec 2023 07:00) (63 - 70)  BP: 123/60 (25 Dec 2023 07:00) (104/53 - 127/56)  BP(mean): --  RR: 18 (25 Dec 2023 07:00) (17 - 18)  SpO2: 100% (25 Dec 2023 07:00) (100% - 100%)    Parameters below as of 25 Dec 2023 07:00  Patient On (Oxygen Delivery Method): room air        CONSTITUTIONAL: NAD, well-developed, well-groomed  RESPIRATORY: Normal respiratory effort; lungs are clear to auscultation bilaterally  CARDIOVASCULAR: Regular rate and rhythm, normal S1 and S2, no murmur/rub/gallop; No lower extremity edema  GASTROINTESTINAL: Nontender to palpation, normoactive bowel sounds, no rebound/guarding; No hepatosplenomegaly  MUSCULOSKELETAL:  no clubbing or cyanosis of digits; no joint swelling or tenderness to palpation  NEUROLOGY: non-focal; no gross sensory deficits   PSYCH: A+O to person, place, and time; affect appropriate  SKIN: No rashes; warm     LABS:                        10.3   10.79 )-----------( 301      ( 25 Dec 2023 06:54 )             31.5     12-25    138  |  109<H>  |  27<H>  ----------------------------<  234<H>  4.2   |  18<L>  |  0.97    Ca    8.4      25 Dec 2023 06:54  Phos  3.5     12-25  Mg     2.00     12-25            Urinalysis Basic - ( 25 Dec 2023 06:54 )    Color: x / Appearance: x / SG: x / pH: x  Gluc: 234 mg/dL / Ketone: x  / Bili: x / Urobili: x   Blood: x / Protein: x / Nitrite: x   Leuk Esterase: x / RBC: x / WBC x   Sq Epi: x / Non Sq Epi: x / Bacteria: x          RADIOLOGY & ADDITIONAL TESTS:  Results Reviewed:   Imaging Personally Reviewed:  Electrocardiogram Personally Reviewed:    COORDINATION OF CARE:  Care Discussed with Consultants/Other Providers [Y/N]:  Prior or Outpatient Records Reviewed [Y/N]:

## 2023-12-26 LAB
ACTH SER-ACNC: 6.4 PG/ML — LOW (ref 7.2–63.3)
ACTH SER-ACNC: 6.4 PG/ML — LOW (ref 7.2–63.3)
ANION GAP SERPL CALC-SCNC: 10 MMOL/L — SIGNIFICANT CHANGE UP (ref 7–14)
ANION GAP SERPL CALC-SCNC: 10 MMOL/L — SIGNIFICANT CHANGE UP (ref 7–14)
BUN SERPL-MCNC: 27 MG/DL — HIGH (ref 7–23)
BUN SERPL-MCNC: 27 MG/DL — HIGH (ref 7–23)
CALCIUM SERPL-MCNC: 8.4 MG/DL — SIGNIFICANT CHANGE UP (ref 8.4–10.5)
CALCIUM SERPL-MCNC: 8.4 MG/DL — SIGNIFICANT CHANGE UP (ref 8.4–10.5)
CHLORIDE SERPL-SCNC: 109 MMOL/L — HIGH (ref 98–107)
CHLORIDE SERPL-SCNC: 109 MMOL/L — HIGH (ref 98–107)
CO2 SERPL-SCNC: 19 MMOL/L — LOW (ref 22–31)
CO2 SERPL-SCNC: 19 MMOL/L — LOW (ref 22–31)
COLLECT DURATION TIME UR: 24 HR — SIGNIFICANT CHANGE UP
COLLECT DURATION TIME UR: 24 HR — SIGNIFICANT CHANGE UP
CORTIS AM PEAK SERPL-MCNC: 0.5 UG/DL — LOW (ref 6–18.4)
CREAT SERPL-MCNC: 1.14 MG/DL — SIGNIFICANT CHANGE UP (ref 0.5–1.3)
CREAT SERPL-MCNC: 1.14 MG/DL — SIGNIFICANT CHANGE UP (ref 0.5–1.3)
EGFR: 58 ML/MIN/1.73M2 — LOW
EGFR: 58 ML/MIN/1.73M2 — LOW
GLUCOSE BLDC GLUCOMTR-MCNC: 117 MG/DL — HIGH (ref 70–99)
GLUCOSE BLDC GLUCOMTR-MCNC: 117 MG/DL — HIGH (ref 70–99)
GLUCOSE BLDC GLUCOMTR-MCNC: 149 MG/DL — HIGH (ref 70–99)
GLUCOSE BLDC GLUCOMTR-MCNC: 149 MG/DL — HIGH (ref 70–99)
GLUCOSE BLDC GLUCOMTR-MCNC: 190 MG/DL — HIGH (ref 70–99)
GLUCOSE BLDC GLUCOMTR-MCNC: 190 MG/DL — HIGH (ref 70–99)
GLUCOSE BLDC GLUCOMTR-MCNC: 230 MG/DL — HIGH (ref 70–99)
GLUCOSE BLDC GLUCOMTR-MCNC: 230 MG/DL — HIGH (ref 70–99)
GLUCOSE SERPL-MCNC: 253 MG/DL — HIGH (ref 70–99)
GLUCOSE SERPL-MCNC: 253 MG/DL — HIGH (ref 70–99)
HCT VFR BLD CALC: 31.5 % — LOW (ref 34.5–45)
HCT VFR BLD CALC: 31.5 % — LOW (ref 34.5–45)
HGB BLD-MCNC: 10.3 G/DL — LOW (ref 11.5–15.5)
HGB BLD-MCNC: 10.3 G/DL — LOW (ref 11.5–15.5)
MAGNESIUM SERPL-MCNC: 1.9 MG/DL — SIGNIFICANT CHANGE UP (ref 1.6–2.6)
MAGNESIUM SERPL-MCNC: 1.9 MG/DL — SIGNIFICANT CHANGE UP (ref 1.6–2.6)
MCHC RBC-ENTMCNC: 30 PG — SIGNIFICANT CHANGE UP (ref 27–34)
MCHC RBC-ENTMCNC: 30 PG — SIGNIFICANT CHANGE UP (ref 27–34)
MCHC RBC-ENTMCNC: 32.7 GM/DL — SIGNIFICANT CHANGE UP (ref 32–36)
MCHC RBC-ENTMCNC: 32.7 GM/DL — SIGNIFICANT CHANGE UP (ref 32–36)
MCV RBC AUTO: 91.8 FL — SIGNIFICANT CHANGE UP (ref 80–100)
MCV RBC AUTO: 91.8 FL — SIGNIFICANT CHANGE UP (ref 80–100)
NRBC # BLD: 0 /100 WBCS — SIGNIFICANT CHANGE UP (ref 0–0)
NRBC # BLD: 0 /100 WBCS — SIGNIFICANT CHANGE UP (ref 0–0)
NRBC # FLD: 0 K/UL — SIGNIFICANT CHANGE UP (ref 0–0)
NRBC # FLD: 0 K/UL — SIGNIFICANT CHANGE UP (ref 0–0)
PHOSPHATE SERPL-MCNC: 3.7 MG/DL — SIGNIFICANT CHANGE UP (ref 2.5–4.5)
PHOSPHATE SERPL-MCNC: 3.7 MG/DL — SIGNIFICANT CHANGE UP (ref 2.5–4.5)
PLATELET # BLD AUTO: 408 K/UL — HIGH (ref 150–400)
PLATELET # BLD AUTO: 408 K/UL — HIGH (ref 150–400)
POTASSIUM SERPL-MCNC: 4.4 MMOL/L — SIGNIFICANT CHANGE UP (ref 3.5–5.3)
POTASSIUM SERPL-MCNC: 4.4 MMOL/L — SIGNIFICANT CHANGE UP (ref 3.5–5.3)
POTASSIUM SERPL-SCNC: 4.4 MMOL/L — SIGNIFICANT CHANGE UP (ref 3.5–5.3)
POTASSIUM SERPL-SCNC: 4.4 MMOL/L — SIGNIFICANT CHANGE UP (ref 3.5–5.3)
RBC # BLD: 3.43 M/UL — LOW (ref 3.8–5.2)
RBC # BLD: 3.43 M/UL — LOW (ref 3.8–5.2)
RBC # FLD: 13.3 % — SIGNIFICANT CHANGE UP (ref 10.3–14.5)
RBC # FLD: 13.3 % — SIGNIFICANT CHANGE UP (ref 10.3–14.5)
SODIUM SERPL-SCNC: 138 MMOL/L — SIGNIFICANT CHANGE UP (ref 135–145)
SODIUM SERPL-SCNC: 138 MMOL/L — SIGNIFICANT CHANGE UP (ref 135–145)
TOTAL VOLUME - 24 HOUR: 3035 ML — SIGNIFICANT CHANGE UP
TOTAL VOLUME - 24 HOUR: 3035 ML — SIGNIFICANT CHANGE UP
URINE CREATININE CALCULATION: 2.2 G/24 H — HIGH (ref 0.6–1.6)
URINE CREATININE CALCULATION: 2.2 G/24 H — HIGH (ref 0.6–1.6)
WBC # BLD: 12.56 K/UL — HIGH (ref 3.8–10.5)
WBC # BLD: 12.56 K/UL — HIGH (ref 3.8–10.5)
WBC # FLD AUTO: 12.56 K/UL — HIGH (ref 3.8–10.5)
WBC # FLD AUTO: 12.56 K/UL — HIGH (ref 3.8–10.5)

## 2023-12-26 PROCEDURE — 99233 SBSQ HOSP IP/OBS HIGH 50: CPT

## 2023-12-26 PROCEDURE — 99232 SBSQ HOSP IP/OBS MODERATE 35: CPT

## 2023-12-26 RX ORDER — KETOROLAC TROMETHAMINE 30 MG/ML
10 SYRINGE (ML) INJECTION EVERY 8 HOURS
Refills: 0 | Status: DISCONTINUED | OUTPATIENT
Start: 2023-12-26 | End: 2023-12-28

## 2023-12-26 RX ORDER — KETOROLAC TROMETHAMINE 30 MG/ML
20 SYRINGE (ML) INJECTION EVERY 12 HOURS
Refills: 0 | Status: DISCONTINUED | OUTPATIENT
Start: 2023-12-26 | End: 2023-12-26

## 2023-12-26 RX ORDER — INSULIN GLARGINE 100 [IU]/ML
58 INJECTION, SOLUTION SUBCUTANEOUS AT BEDTIME
Refills: 0 | Status: DISCONTINUED | OUTPATIENT
Start: 2023-12-26 | End: 2023-12-28

## 2023-12-26 RX ORDER — INSULIN LISPRO 100/ML
22 VIAL (ML) SUBCUTANEOUS
Refills: 0 | Status: DISCONTINUED | OUTPATIENT
Start: 2023-12-26 | End: 2023-12-28

## 2023-12-26 RX ORDER — METOCLOPRAMIDE HCL 10 MG
10 TABLET ORAL EVERY 8 HOURS
Refills: 0 | Status: COMPLETED | OUTPATIENT
Start: 2023-12-26 | End: 2023-12-28

## 2023-12-26 RX ORDER — DIPHENHYDRAMINE HCL 50 MG
50 CAPSULE ORAL EVERY 8 HOURS
Refills: 0 | Status: DISCONTINUED | OUTPATIENT
Start: 2023-12-26 | End: 2023-12-29

## 2023-12-26 RX ADMIN — Medication 22 UNIT(S): at 17:30

## 2023-12-26 RX ADMIN — OXYCODONE HYDROCHLORIDE 15 MILLIGRAM(S): 5 TABLET ORAL at 09:10

## 2023-12-26 RX ADMIN — OXYCODONE HYDROCHLORIDE 15 MILLIGRAM(S): 5 TABLET ORAL at 00:34

## 2023-12-26 RX ADMIN — Medication 30 MILLIGRAM(S): at 06:24

## 2023-12-26 RX ADMIN — INSULIN GLARGINE 58 UNIT(S): 100 INJECTION, SOLUTION SUBCUTANEOUS at 22:40

## 2023-12-26 RX ADMIN — OXYCODONE HYDROCHLORIDE 15 MILLIGRAM(S): 5 TABLET ORAL at 09:40

## 2023-12-26 RX ADMIN — LISINOPRIL 5 MILLIGRAM(S): 2.5 TABLET ORAL at 05:24

## 2023-12-26 RX ADMIN — Medication 2 TABLET(S): at 05:56

## 2023-12-26 RX ADMIN — Medication 20 UNIT(S): at 09:07

## 2023-12-26 RX ADMIN — PANTOPRAZOLE SODIUM 40 MILLIGRAM(S): 20 TABLET, DELAYED RELEASE ORAL at 09:08

## 2023-12-26 RX ADMIN — BUMETANIDE 2 MILLIGRAM(S): 0.25 INJECTION INTRAMUSCULAR; INTRAVENOUS at 05:24

## 2023-12-26 RX ADMIN — Medication 4: at 09:07

## 2023-12-26 RX ADMIN — ATORVASTATIN CALCIUM 20 MILLIGRAM(S): 80 TABLET, FILM COATED ORAL at 22:07

## 2023-12-26 RX ADMIN — Medication 10 MILLIGRAM(S): at 14:29

## 2023-12-26 RX ADMIN — Medication 5 MILLIGRAM(S): at 13:19

## 2023-12-26 RX ADMIN — Medication 10 MILLIGRAM(S): at 05:25

## 2023-12-26 RX ADMIN — OXYCODONE HYDROCHLORIDE 15 MILLIGRAM(S): 5 TABLET ORAL at 17:30

## 2023-12-26 RX ADMIN — ACETAZOLAMIDE 500 MILLIGRAM(S): 250 TABLET ORAL at 17:31

## 2023-12-26 RX ADMIN — ACETAZOLAMIDE 500 MILLIGRAM(S): 250 TABLET ORAL at 05:24

## 2023-12-26 RX ADMIN — Medication 6 MILLIGRAM(S): at 22:07

## 2023-12-26 RX ADMIN — Medication 10 MILLIGRAM(S): at 22:07

## 2023-12-26 RX ADMIN — Medication 22 UNIT(S): at 12:40

## 2023-12-26 RX ADMIN — CARVEDILOL PHOSPHATE 3.12 MILLIGRAM(S): 80 CAPSULE, EXTENDED RELEASE ORAL at 05:24

## 2023-12-26 RX ADMIN — Medication 2 TABLET(S): at 04:56

## 2023-12-26 RX ADMIN — Medication 10 MILLIGRAM(S): at 14:59

## 2023-12-26 RX ADMIN — Medication 30 MILLIGRAM(S): at 05:24

## 2023-12-26 RX ADMIN — SPIRONOLACTONE 25 MILLIGRAM(S): 25 TABLET, FILM COATED ORAL at 05:24

## 2023-12-26 RX ADMIN — OXYCODONE HYDROCHLORIDE 15 MILLIGRAM(S): 5 TABLET ORAL at 18:00

## 2023-12-26 RX ADMIN — Medication 50 MILLIGRAM(S): at 22:40

## 2023-12-26 RX ADMIN — CARVEDILOL PHOSPHATE 3.12 MILLIGRAM(S): 80 CAPSULE, EXTENDED RELEASE ORAL at 17:30

## 2023-12-26 RX ADMIN — NALOXEGOL OXALATE 25 MILLIGRAM(S): 12.5 TABLET, FILM COATED ORAL at 12:40

## 2023-12-26 RX ADMIN — Medication 50 MILLIGRAM(S): at 14:29

## 2023-12-26 NOTE — PROGRESS NOTE ADULT - ASSESSMENT
This is a 52 yo F /w a PMH of CHF, HTN, HLD,  Idiopathic intracranial hypertension, complex regional pain syndrome who presents with worsening headache. Endocrinology consulted for evaluation of type 2 diabetes.    #DM2  -Increaed admelog 22 units before meals  -increased lantus to 58 units nightly  -start moderate admelog correction scales before meals  -moderate admelog correction scales at bedtime  -carb consistent diet  -FSG before meals and before bedtime  -target glucose 100-180mg/dL  -hypoglycemia protocol in place if needed    #discharge  -follow up with PCP  -c/w home tresiba 45 units daily, admelog 12 units TID AC, metformin 1000mg BID.  -can see if patient can be transitioned to Mounjaro 2.5mg SQ once weekly for improved weight loss benefit. If unable to obtain Mounjaro, can increase ozempic to 0.5mg weekly  -can be evaluated about starting SGLT2i as an outpatient given CHF history    #r/o Cushing's disease - obesity, htn, DM2, easy bruising, hair loss, hirsutism, irregular menses  -please check 24 hr urine cortisol and 24 hour urine creatinine   -after this is done can also obtain 1mg dexamethasone suppression test    #HTN  -c/w coreg 3.125mg BID, spironolactone 25mg daily, and lisinopril 10mg daily  -BP target <130/80    #HLD  -c/w atorvastatin 20mg daily  outpatient lipid profile      Melissa Connors  Nurse Practitioner  Division of Endocrinology & Diabetes  Available via  Teams      If after 6PM or before 9AM, or on weekends/holidays, please call endocrine answering service for assistance ([phone]).  For nonurgent matters email [email or web address] for assistance. This is a 52 yo F /w a PMH of CHF, HTN, HLD,  Idiopathic intracranial hypertension, complex regional pain syndrome who presents with worsening headache. Endocrinology consulted for evaluation of type 2 diabetes.    #DM2  -Increaed admelog 22 units before meals  -increased lantus to 58 units nightly  -start moderate admelog correction scales before meals  -moderate admelog correction scales at bedtime  -carb consistent diet  -FSG before meals and before bedtime  -target glucose 100-180mg/dL  -hypoglycemia protocol in place if needed    #discharge  -follow up with PCP  -c/w home tresiba 45 units daily, admelog 12 units TID AC, metformin 1000mg BID.  -can see if patient can be transitioned to Mounjaro 2.5mg SQ once weekly for improved weight loss benefit. If unable to obtain Mounjaro, can increase ozempic to 0.5mg weekly  -can be evaluated about starting SGLT2i as an outpatient given CHF history    #r/o Cushing's disease - obesity, htn, DM2, easy bruising, hair loss, hirsutism, irregular menses  -please check 24 hr urine cortisol and 24 hour urine creatinine   -after this is done can also obtain 1mg dexamethasone suppression test    #HTN  -c/w coreg 3.125mg BID, spironolactone 25mg daily, and lisinopril 10mg daily  -BP target <130/80    #HLD  -c/w atorvastatin 20mg daily  outpatient lipid profile      Melissa Connors  Nurse Practitioner  Division of Endocrinology & Diabetes  Available via  Teams      If after 6PM or before 9AM, or on weekends/holidays, please call endocrine answering service for assistance ([phone]).  For nonurgent matters email [email or web address] for assistance.

## 2023-12-26 NOTE — PROGRESS NOTE ADULT - PROBLEM SELECTOR PLAN 3
-HgbA1c 7.5  -appreciate endo recs - Lantus 55U qhs + Admelog 20U TID + mod SSI  - per endo r/o Cushing's disease - obesity, htn, DM2, easy bruising, hair loss, hirsutism, irregular menses  - 24 hours urine cortisol collected (result pending),   - 1mg dex suppression test 12/25. am cortisol level appropriately suppressed

## 2023-12-26 NOTE — PROGRESS NOTE ADULT - PROBLEM SELECTOR PLAN 1
Patient reports to have band-like headache, tinnitus, blurry vision, and photophobia, consistent with her previous episode of idiopathic intracranial HTN /pseudotumor cerebri   -CT head and venogram is negative for any acute pathology   -Unable to get MRI/MRV brain, MRI orbits w/wo contrast as pt with neurostimulator which is not MRI compatible.  -ED attempted to perform LP but was not successful, Had LP on 12/11 with IR, opening pressure measured 11 cm water.   -Post LP pt has worse headache- neuro rec blood patch.   -anesthesia rec CTH and IR consult for blood path if imaging shows signs of intracranial hypotension   -Head ct with contrast 12/16: Expanded sella turcica with CSF attenuation due to near-complete empty sella versus underlying arachnoid cyst. Findings may be seen in the setting of intracranial hypertension.  -case discussed with neurology- started on trial of migraine cocktail- toradol, reglan with good response. will change both to oral today. also on Esgic prn and oxy  -c/w diamox.

## 2023-12-26 NOTE — PROGRESS NOTE ADULT - ASSESSMENT
52 yo F with Pmhx of CHF, HTN, HLD, DM, Idiopathic intracranial hypertension (diagnosed 2020), chronic back pain (intercostal neuralgia/complex regional pain syndrome) s/p spinal stimulator presents to the ED with worsening headache thought to be  due to Pseudotumor cerebri. s/p  LP by IR found to have normal opening pressure.  now with worse headache after LP  54 yo F with Pmhx of CHF, HTN, HLD, DM, Idiopathic intracranial hypertension (diagnosed 2020), chronic back pain (intercostal neuralgia/complex regional pain syndrome) s/p spinal stimulator presents to the ED with worsening headache thought to be  due to Pseudotumor cerebri. s/p  LP by IR found to have normal opening pressure.  now with worse headache after LP

## 2023-12-26 NOTE — PROGRESS NOTE ADULT - SUBJECTIVE AND OBJECTIVE BOX
[facility] Division of Hospital Medicine  Anderson Yates MD  Pager [phone]      Patient is a 53y old  Female who presents with a chief complaint of Headache, tinnitus (25 Dec 2023 13:07)      SUBJECTIVE / OVERNIGHT EVENTS:    no acute event o/n. cont to report headache- states that it keeps her awake all night    ADDITIONAL REVIEW OF SYSTEMS:    RESPIRATORY: No cough, wheezing, chills or hemoptysis; No shortness of breath  CARDIOVASCULAR: No chest pain, palpitations, dizziness, or leg swelling  GASTROINTESTINAL: No abdominal or epigastric pain. No nausea, vomiting, or hematemesis; No diarrhea or constipation. No melena or hematochezia.    MEDICATIONS  (STANDING):  acetaZOLAMIDE    Tablet 500 milliGRAM(s) Oral two times a day  atorvastatin 20 milliGRAM(s) Oral at bedtime  buMETAnide 2 milliGRAM(s) Oral daily  carvedilol 3.125 milliGRAM(s) Oral every 12 hours  dextrose 50% Injectable 25 Gram(s) IV Push once  ergocalciferol 55776 Unit(s) Oral <User Schedule>  insulin glargine Injectable (LANTUS) 58 Unit(s) SubCutaneous at bedtime  insulin lispro (ADMELOG) corrective regimen sliding scale   SubCutaneous three times a day before meals  insulin lispro (ADMELOG) corrective regimen sliding scale   SubCutaneous at bedtime  insulin lispro Injectable (ADMELOG) 22 Unit(s) SubCutaneous three times a day before meals  ketorolac 10 milliGRAM(s) Oral every 8 hours  lisinopril 5 milliGRAM(s) Oral daily  metoclopramide 10 milliGRAM(s) Oral every 8 hours  naloxegol 25 milliGRAM(s) Oral daily  pantoprazole    Tablet 40 milliGRAM(s) Oral before breakfast  spironolactone 25 milliGRAM(s) Oral daily    MEDICATIONS  (PRN):  acetaminophen 325 mG/butalbital 50 mG/caffeine 40 mG 2 Tablet(s) Oral every 8 hours PRN migraine  aluminum hydroxide/magnesium hydroxide/simethicone Suspension 30 milliLiter(s) Oral every 4 hours PRN Dyspepsia  baclofen 5 milliGRAM(s) Oral every 8 hours PRN Muscle Spasm  diphenhydrAMINE 50 milliGRAM(s) Oral every 8 hours PRN Rash and/or Itching  melatonin 6 milliGRAM(s) Oral at bedtime PRN Sleep  ondansetron Injectable 4 milliGRAM(s) IV Push every 8 hours PRN Nausea and/or Vomiting  oxyCODONE    IR 15 milliGRAM(s) Oral every 6 hours PRN Moderate Pain (4 - 6)      CAPILLARY BLOOD GLUCOSE      POCT Blood Glucose.: 149 mg/dL (26 Dec 2023 12:20)  POCT Blood Glucose.: 230 mg/dL (26 Dec 2023 08:55)  POCT Blood Glucose.: 106 mg/dL (25 Dec 2023 21:04)  POCT Blood Glucose.: 210 mg/dL (25 Dec 2023 17:38)    I&O's Summary    25 Dec 2023 07:01  -  26 Dec 2023 07:00  --------------------------------------------------------  IN: 1650 mL / OUT: 2850 mL / NET: -1200 mL        PHYSICAL EXAM:  Vital Signs Last 24 Hrs  T(C): 36.6 (26 Dec 2023 10:15), Max: 36.8 (25 Dec 2023 20:37)  T(F): 97.9 (26 Dec 2023 10:15), Max: 98.2 (25 Dec 2023 20:37)  HR: 60 (26 Dec 2023 10:15) (60 - 78)  BP: 117/91 (26 Dec 2023 10:15) (113/54 - 122/43)  BP(mean): --  RR: 18 (26 Dec 2023 10:15) (18 - 18)  SpO2: 99% (26 Dec 2023 10:15) (99% - 100%)    Parameters below as of 26 Dec 2023 05:17  Patient On (Oxygen Delivery Method): room air        CONSTITUTIONAL: NAD,  EYES: PERRLA; conjunctiva and sclera clear  ENMT: Moist oral mucosa, no pharyngeal injection or exudates;   NECK: Supple, no palpable masses;  RESPIRATORY: Normal respiratory effort; lungs are clear to auscultation bilaterally  CARDIOVASCULAR: Regular rate and rhythm, normal S1 and S2, no murmur/rub/gallop; No lower extremity edema; Peripheral pulses are 2+ bilaterally  ABDOMEN: Nontender to palpation, normoactive bowel sounds, no rebound/guarding;   MUSCLOSKELETAL:   no clubbing or cyanosis of digits; no joint swelling or tenderness to palpation  PSYCH: A+O to person, place, and time; affect appropriate  NEUROLOGY: CN 2-12 are intact and symmetric; no gross sensory deficits;   SKIN: No rashes;     LABS:                        10.3   12.56 )-----------( 408      ( 26 Dec 2023 08:00 )             31.5     12-26    138  |  109<H>  |  27<H>  ----------------------------<  253<H>  4.4   |  19<L>  |  1.14    Ca    8.4      26 Dec 2023 08:00  Phos  3.7     12-26  Mg     1.90     12-26            Urinalysis Basic - ( 26 Dec 2023 08:00 )    Color: x / Appearance: x / SG: x / pH: x  Gluc: 253 mg/dL / Ketone: x  / Bili: x / Urobili: x   Blood: x / Protein: x / Nitrite: x   Leuk Esterase: x / RBC: x / WBC x   Sq Epi: x / Non Sq Epi: x / Bacteria: x          RADIOLOGY & ADDITIONAL TESTS:  Results Reviewed:   Imaging Personally Reviewed:  Electrocardiogram Personally Reviewed:    COORDINATION OF CARE:  Care Discussed with Consultants/Other Providers [Y/N]:  Prior or Outpatient Records Reviewed [Y/N]:   [facility] Division of Hospital Medicine  Anderson Yates MD  Pager [phone]      Patient is a 53y old  Female who presents with a chief complaint of Headache, tinnitus (25 Dec 2023 13:07)      SUBJECTIVE / OVERNIGHT EVENTS:    no acute event o/n. cont to report headache- states that it keeps her awake all night    ADDITIONAL REVIEW OF SYSTEMS:    RESPIRATORY: No cough, wheezing, chills or hemoptysis; No shortness of breath  CARDIOVASCULAR: No chest pain, palpitations, dizziness, or leg swelling  GASTROINTESTINAL: No abdominal or epigastric pain. No nausea, vomiting, or hematemesis; No diarrhea or constipation. No melena or hematochezia.    MEDICATIONS  (STANDING):  acetaZOLAMIDE    Tablet 500 milliGRAM(s) Oral two times a day  atorvastatin 20 milliGRAM(s) Oral at bedtime  buMETAnide 2 milliGRAM(s) Oral daily  carvedilol 3.125 milliGRAM(s) Oral every 12 hours  dextrose 50% Injectable 25 Gram(s) IV Push once  ergocalciferol 33937 Unit(s) Oral <User Schedule>  insulin glargine Injectable (LANTUS) 58 Unit(s) SubCutaneous at bedtime  insulin lispro (ADMELOG) corrective regimen sliding scale   SubCutaneous three times a day before meals  insulin lispro (ADMELOG) corrective regimen sliding scale   SubCutaneous at bedtime  insulin lispro Injectable (ADMELOG) 22 Unit(s) SubCutaneous three times a day before meals  ketorolac 10 milliGRAM(s) Oral every 8 hours  lisinopril 5 milliGRAM(s) Oral daily  metoclopramide 10 milliGRAM(s) Oral every 8 hours  naloxegol 25 milliGRAM(s) Oral daily  pantoprazole    Tablet 40 milliGRAM(s) Oral before breakfast  spironolactone 25 milliGRAM(s) Oral daily    MEDICATIONS  (PRN):  acetaminophen 325 mG/butalbital 50 mG/caffeine 40 mG 2 Tablet(s) Oral every 8 hours PRN migraine  aluminum hydroxide/magnesium hydroxide/simethicone Suspension 30 milliLiter(s) Oral every 4 hours PRN Dyspepsia  baclofen 5 milliGRAM(s) Oral every 8 hours PRN Muscle Spasm  diphenhydrAMINE 50 milliGRAM(s) Oral every 8 hours PRN Rash and/or Itching  melatonin 6 milliGRAM(s) Oral at bedtime PRN Sleep  ondansetron Injectable 4 milliGRAM(s) IV Push every 8 hours PRN Nausea and/or Vomiting  oxyCODONE    IR 15 milliGRAM(s) Oral every 6 hours PRN Moderate Pain (4 - 6)      CAPILLARY BLOOD GLUCOSE      POCT Blood Glucose.: 149 mg/dL (26 Dec 2023 12:20)  POCT Blood Glucose.: 230 mg/dL (26 Dec 2023 08:55)  POCT Blood Glucose.: 106 mg/dL (25 Dec 2023 21:04)  POCT Blood Glucose.: 210 mg/dL (25 Dec 2023 17:38)    I&O's Summary    25 Dec 2023 07:01  -  26 Dec 2023 07:00  --------------------------------------------------------  IN: 1650 mL / OUT: 2850 mL / NET: -1200 mL        PHYSICAL EXAM:  Vital Signs Last 24 Hrs  T(C): 36.6 (26 Dec 2023 10:15), Max: 36.8 (25 Dec 2023 20:37)  T(F): 97.9 (26 Dec 2023 10:15), Max: 98.2 (25 Dec 2023 20:37)  HR: 60 (26 Dec 2023 10:15) (60 - 78)  BP: 117/91 (26 Dec 2023 10:15) (113/54 - 122/43)  BP(mean): --  RR: 18 (26 Dec 2023 10:15) (18 - 18)  SpO2: 99% (26 Dec 2023 10:15) (99% - 100%)    Parameters below as of 26 Dec 2023 05:17  Patient On (Oxygen Delivery Method): room air        CONSTITUTIONAL: NAD,  EYES: PERRLA; conjunctiva and sclera clear  ENMT: Moist oral mucosa, no pharyngeal injection or exudates;   NECK: Supple, no palpable masses;  RESPIRATORY: Normal respiratory effort; lungs are clear to auscultation bilaterally  CARDIOVASCULAR: Regular rate and rhythm, normal S1 and S2, no murmur/rub/gallop; No lower extremity edema; Peripheral pulses are 2+ bilaterally  ABDOMEN: Nontender to palpation, normoactive bowel sounds, no rebound/guarding;   MUSCLOSKELETAL:   no clubbing or cyanosis of digits; no joint swelling or tenderness to palpation  PSYCH: A+O to person, place, and time; affect appropriate  NEUROLOGY: CN 2-12 are intact and symmetric; no gross sensory deficits;   SKIN: No rashes;     LABS:                        10.3   12.56 )-----------( 408      ( 26 Dec 2023 08:00 )             31.5     12-26    138  |  109<H>  |  27<H>  ----------------------------<  253<H>  4.4   |  19<L>  |  1.14    Ca    8.4      26 Dec 2023 08:00  Phos  3.7     12-26  Mg     1.90     12-26            Urinalysis Basic - ( 26 Dec 2023 08:00 )    Color: x / Appearance: x / SG: x / pH: x  Gluc: 253 mg/dL / Ketone: x  / Bili: x / Urobili: x   Blood: x / Protein: x / Nitrite: x   Leuk Esterase: x / RBC: x / WBC x   Sq Epi: x / Non Sq Epi: x / Bacteria: x          RADIOLOGY & ADDITIONAL TESTS:  Results Reviewed:   Imaging Personally Reviewed:  Electrocardiogram Personally Reviewed:    COORDINATION OF CARE:  Care Discussed with Consultants/Other Providers [Y/N]:  Prior or Outpatient Records Reviewed [Y/N]:

## 2023-12-26 NOTE — PROGRESS NOTE ADULT - SUBJECTIVE AND OBJECTIVE BOX
History: denies n/v, tolerating po.  No hypoglycemia    MEDICATIONS  (STANDING):  acetaZOLAMIDE    Tablet 500 milliGRAM(s) Oral two times a day  atorvastatin 20 milliGRAM(s) Oral at bedtime  buMETAnide 2 milliGRAM(s) Oral daily  carvedilol 3.125 milliGRAM(s) Oral every 12 hours  dextrose 50% Injectable 25 Gram(s) IV Push once  ergocalciferol 47209 Unit(s) Oral <User Schedule>  insulin glargine Injectable (LANTUS) 58 Unit(s) SubCutaneous at bedtime  insulin lispro (ADMELOG) corrective regimen sliding scale   SubCutaneous three times a day before meals  insulin lispro (ADMELOG) corrective regimen sliding scale   SubCutaneous at bedtime  insulin lispro Injectable (ADMELOG) 22 Unit(s) SubCutaneous three times a day before meals  ketorolac 10 milliGRAM(s) Oral every 8 hours  lisinopril 5 milliGRAM(s) Oral daily  metoclopramide 10 milliGRAM(s) Oral every 8 hours  naloxegol 25 milliGRAM(s) Oral daily  pantoprazole    Tablet 40 milliGRAM(s) Oral before breakfast  spironolactone 25 milliGRAM(s) Oral daily    MEDICATIONS  (PRN):  acetaminophen 325 mG/butalbital 50 mG/caffeine 40 mG 2 Tablet(s) Oral every 8 hours PRN migraine  aluminum hydroxide/magnesium hydroxide/simethicone Suspension 30 milliLiter(s) Oral every 4 hours PRN Dyspepsia  baclofen 5 milliGRAM(s) Oral every 8 hours PRN Muscle Spasm  diphenhydrAMINE 50 milliGRAM(s) Oral every 8 hours PRN Rash and/or Itching  melatonin 6 milliGRAM(s) Oral at bedtime PRN Sleep  ondansetron Injectable 4 milliGRAM(s) IV Push every 8 hours PRN Nausea and/or Vomiting  oxyCODONE    IR 15 milliGRAM(s) Oral every 6 hours PRN Moderate Pain (4 - 6)      Allergies    amitriptyline (Other)    Intolerances      Review of Systems:  Constitutional: No fever  Eyes: No blurry vision  Neuro: No tremors  HEENT: No pain  Cardiovascular: No chest pain, palpitations  Respiratory: No SOB, no cough  GI: No nausea, vomiting, abdominal pain  : No dysuria  Skin: no rash  Psych: no depression  Endocrine: no polyuria, polydipsia  Hem/lymph: no swelling  Osteoporosis: no fractures    ALL OTHER SYSTEMS REVIEWED AND NEGATIVE    UNABLE TO OBTAIN    PHYSICAL EXAM:  VITALS: T(C): 36.6 (12-26-23 @ 17:25)  T(F): 97.8 (12-26-23 @ 17:25), Max: 98.2 (12-25-23 @ 20:37)  HR: 66 (12-26-23 @ 17:25) (60 - 74)  BP: 124/52 (12-26-23 @ 17:25) (113/54 - 124/52)  RR:  (18 - 18)  SpO2:  (98% - 100%)  Wt(kg): --  GENERAL: NAD, well-developed  EYES: No proptosis, no lid lag, anicteric  HEENT:  Atraumatic, Normocephalic, moist mucous membranes  THYROID: Normal size, no palpable nodules  RESPIRATORY: Clear to auscultation bilaterally; No rales, rhonchi, wheezing  CARDIOVASCULAR: Regular rate and rhythm; No murmurs; no peripheral edema  GI: Soft, nontender, non distended  SKIN: Dry, intact, No rashes or lesions  MUSCULOSKELETAL: Full range of motion, normal strength  NEURO: extraocular movements intact, no tremor  PSYCH: Alert and oriented x 3, normal affect, normal mood      CAPILLARY BLOOD GLUCOSE      POCT Blood Glucose.: 117 mg/dL (26 Dec 2023 17:21)  POCT Blood Glucose.: 149 mg/dL (26 Dec 2023 12:20)  POCT Blood Glucose.: 230 mg/dL (26 Dec 2023 08:55)  POCT Blood Glucose.: 106 mg/dL (25 Dec 2023 21:04)      12-26    138  |  109<H>  |  27<H>  ----------------------------<  253<H>  4.4   |  19<L>  |  1.14    eGFR: 58<L>    Ca    8.4      12-26  Mg     1.90     12-26  Phos  3.7     12-26            Thyroid Function Tests:  12-23 @ 06:22 TSH 4.45 FreeT4 1.3 T3 -- Anti TPO -- Anti Thyroglobulin Ab -- TSI --                         History: denies n/v, tolerating po.  No hypoglycemia    MEDICATIONS  (STANDING):  acetaZOLAMIDE    Tablet 500 milliGRAM(s) Oral two times a day  atorvastatin 20 milliGRAM(s) Oral at bedtime  buMETAnide 2 milliGRAM(s) Oral daily  carvedilol 3.125 milliGRAM(s) Oral every 12 hours  dextrose 50% Injectable 25 Gram(s) IV Push once  ergocalciferol [id number] Unit(s) Oral <User Schedule>  insulin glargine Injectable (LANTUS) 58 Unit(s) SubCutaneous at bedtime  insulin lispro (ADMELOG) corrective regimen sliding scale   SubCutaneous three times a day before meals  insulin lispro (ADMELOG) corrective regimen sliding scale   SubCutaneous at bedtime  insulin lispro Injectable (ADMELOG) 22 Unit(s) SubCutaneous three times a day before meals  ketorolac 10 milliGRAM(s) Oral every 8 hours  lisinopril 5 milliGRAM(s) Oral daily  metoclopramide 10 milliGRAM(s) Oral every 8 hours  naloxegol 25 milliGRAM(s) Oral daily  pantoprazole    Tablet 40 milliGRAM(s) Oral before breakfast  spironolactone 25 milliGRAM(s) Oral daily    MEDICATIONS  (PRN):  acetaminophen 325 mG/butalbital 50 mG/caffeine 40 mG 2 Tablet(s) Oral every 8 hours PRN migraine  aluminum hydroxide/magnesium hydroxide/simethicone Suspension 30 milliLiter(s) Oral every 4 hours PRN Dyspepsia  baclofen 5 milliGRAM(s) Oral every 8 hours PRN Muscle Spasm  diphenhydrAMINE 50 milliGRAM(s) Oral every 8 hours PRN Rash and/or Itching  melatonin 6 milliGRAM(s) Oral at bedtime PRN Sleep  ondansetron Injectable 4 milliGRAM(s) IV Push every 8 hours PRN Nausea and/or Vomiting  oxyCODONE    IR 15 milliGRAM(s) Oral every 6 hours PRN Moderate Pain (4 - 6)      Allergies    amitriptyline (Other)    Intolerances      Review of Systems:  Constitutional: No fever  Eyes: No blurry vision  Neuro: No tremors  HEENT: No pain  Cardiovascular: No chest pain, palpitations  Respiratory: No SOB, no cough  GI: No nausea, vomiting, abdominal pain  : No dysuria  Skin: no rash  Psych: no depression  Endocrine: no polyuria, polydipsia  Hem/lymph: no swelling  Osteoporosis: no fractures    ALL OTHER SYSTEMS REVIEWED AND NEGATIVE    UNABLE TO OBTAIN    PHYSICAL EXAM:  VITALS: T(C): 36.6 (12-26-23 @ 17:25)  T(F): 97.8 (12-26-23 @ 17:25), Max: 98.2 (12-25-23 @ 20:37)  HR: 66 (12-26-23 @ 17:25) (60 - 74)  BP: 124/52 (12-26-23 @ 17:25) (113/54 - 124/52)  RR:  (18 - 18)  SpO2:  (98% - 100%)  Wt(kg): --  GENERAL: NAD, well-developed  EYES: No proptosis, no lid lag, anicteric  HEENT:  Atraumatic, Normocephalic, moist mucous membranes  THYROID: Normal size, no palpable nodules  RESPIRATORY: Clear to auscultation bilaterally; No rales, rhonchi, wheezing  CARDIOVASCULAR: Regular rate and rhythm; No murmurs; no peripheral edema  GI: Soft, nontender, non distended  SKIN: Dry, intact, No rashes or lesions  MUSCULOSKELETAL: Full range of motion, normal strength  NEURO: extraocular movements intact, no tremor  PSYCH: Alert and oriented x 3, normal affect, normal mood      CAPILLARY BLOOD GLUCOSE      POCT Blood Glucose.: 117 mg/dL (26 Dec 2023 17:21)  POCT Blood Glucose.: 149 mg/dL (26 Dec 2023 12:20)  POCT Blood Glucose.: 230 mg/dL (26 Dec 2023 08:55)  POCT Blood Glucose.: 106 mg/dL (25 Dec 2023 21:04)      12-26    138  |  109<H>  |  27<H>  ----------------------------<  253<H>  4.4   |  19<L>  |  1.14    eGFR: 58<L>    Ca    8.4      12-26  Mg     1.90     12-26  Phos  3.7     12-26            Thyroid Function Tests:  12-23 @ 06:22 TSH 4.45 FreeT4 1.3 T3 -- Anti TPO -- Anti Thyroglobulin Ab -- TSI --

## 2023-12-27 LAB
ANION GAP SERPL CALC-SCNC: 12 MMOL/L — SIGNIFICANT CHANGE UP (ref 7–14)
ANION GAP SERPL CALC-SCNC: 12 MMOL/L — SIGNIFICANT CHANGE UP (ref 7–14)
BASOPHILS # BLD AUTO: 0.03 K/UL — SIGNIFICANT CHANGE UP (ref 0–0.2)
BASOPHILS # BLD AUTO: 0.03 K/UL — SIGNIFICANT CHANGE UP (ref 0–0.2)
BASOPHILS NFR BLD AUTO: 0.3 % — SIGNIFICANT CHANGE UP (ref 0–2)
BASOPHILS NFR BLD AUTO: 0.3 % — SIGNIFICANT CHANGE UP (ref 0–2)
BUN SERPL-MCNC: 32 MG/DL — HIGH (ref 7–23)
BUN SERPL-MCNC: 32 MG/DL — HIGH (ref 7–23)
CALCIUM SERPL-MCNC: 8 MG/DL — LOW (ref 8.4–10.5)
CALCIUM SERPL-MCNC: 8 MG/DL — LOW (ref 8.4–10.5)
CHLORIDE SERPL-SCNC: 113 MMOL/L — HIGH (ref 98–107)
CHLORIDE SERPL-SCNC: 113 MMOL/L — HIGH (ref 98–107)
CO2 SERPL-SCNC: 18 MMOL/L — LOW (ref 22–31)
CO2 SERPL-SCNC: 18 MMOL/L — LOW (ref 22–31)
CREAT SERPL-MCNC: 1.29 MG/DL — SIGNIFICANT CHANGE UP (ref 0.5–1.3)
CREAT SERPL-MCNC: 1.29 MG/DL — SIGNIFICANT CHANGE UP (ref 0.5–1.3)
EGFR: 50 ML/MIN/1.73M2 — LOW
EGFR: 50 ML/MIN/1.73M2 — LOW
EOSINOPHIL # BLD AUTO: 0.23 K/UL — SIGNIFICANT CHANGE UP (ref 0–0.5)
EOSINOPHIL # BLD AUTO: 0.23 K/UL — SIGNIFICANT CHANGE UP (ref 0–0.5)
EOSINOPHIL NFR BLD AUTO: 2.1 % — SIGNIFICANT CHANGE UP (ref 0–6)
EOSINOPHIL NFR BLD AUTO: 2.1 % — SIGNIFICANT CHANGE UP (ref 0–6)
GLUCOSE BLDC GLUCOMTR-MCNC: 117 MG/DL — HIGH (ref 70–99)
GLUCOSE BLDC GLUCOMTR-MCNC: 117 MG/DL — HIGH (ref 70–99)
GLUCOSE BLDC GLUCOMTR-MCNC: 127 MG/DL — HIGH (ref 70–99)
GLUCOSE BLDC GLUCOMTR-MCNC: 127 MG/DL — HIGH (ref 70–99)
GLUCOSE BLDC GLUCOMTR-MCNC: 184 MG/DL — HIGH (ref 70–99)
GLUCOSE BLDC GLUCOMTR-MCNC: 184 MG/DL — HIGH (ref 70–99)
GLUCOSE BLDC GLUCOMTR-MCNC: 185 MG/DL — HIGH (ref 70–99)
GLUCOSE BLDC GLUCOMTR-MCNC: 185 MG/DL — HIGH (ref 70–99)
GLUCOSE SERPL-MCNC: 166 MG/DL — HIGH (ref 70–99)
GLUCOSE SERPL-MCNC: 166 MG/DL — HIGH (ref 70–99)
HCT VFR BLD CALC: 30.5 % — LOW (ref 34.5–45)
HCT VFR BLD CALC: 30.5 % — LOW (ref 34.5–45)
HGB BLD-MCNC: 9.7 G/DL — LOW (ref 11.5–15.5)
HGB BLD-MCNC: 9.7 G/DL — LOW (ref 11.5–15.5)
IANC: 5.91 K/UL — SIGNIFICANT CHANGE UP (ref 1.8–7.4)
IANC: 5.91 K/UL — SIGNIFICANT CHANGE UP (ref 1.8–7.4)
IMM GRANULOCYTES NFR BLD AUTO: 0.4 % — SIGNIFICANT CHANGE UP (ref 0–0.9)
IMM GRANULOCYTES NFR BLD AUTO: 0.4 % — SIGNIFICANT CHANGE UP (ref 0–0.9)
LYMPHOCYTES # BLD AUTO: 39.5 % — SIGNIFICANT CHANGE UP (ref 13–44)
LYMPHOCYTES # BLD AUTO: 39.5 % — SIGNIFICANT CHANGE UP (ref 13–44)
LYMPHOCYTES # BLD AUTO: 4.42 K/UL — HIGH (ref 1–3.3)
LYMPHOCYTES # BLD AUTO: 4.42 K/UL — HIGH (ref 1–3.3)
MAGNESIUM SERPL-MCNC: 1.8 MG/DL — SIGNIFICANT CHANGE UP (ref 1.6–2.6)
MAGNESIUM SERPL-MCNC: 1.8 MG/DL — SIGNIFICANT CHANGE UP (ref 1.6–2.6)
MCHC RBC-ENTMCNC: 29.7 PG — SIGNIFICANT CHANGE UP (ref 27–34)
MCHC RBC-ENTMCNC: 29.7 PG — SIGNIFICANT CHANGE UP (ref 27–34)
MCHC RBC-ENTMCNC: 31.8 GM/DL — LOW (ref 32–36)
MCHC RBC-ENTMCNC: 31.8 GM/DL — LOW (ref 32–36)
MCV RBC AUTO: 93.3 FL — SIGNIFICANT CHANGE UP (ref 80–100)
MCV RBC AUTO: 93.3 FL — SIGNIFICANT CHANGE UP (ref 80–100)
MONOCYTES # BLD AUTO: 0.54 K/UL — SIGNIFICANT CHANGE UP (ref 0–0.9)
MONOCYTES # BLD AUTO: 0.54 K/UL — SIGNIFICANT CHANGE UP (ref 0–0.9)
MONOCYTES NFR BLD AUTO: 4.8 % — SIGNIFICANT CHANGE UP (ref 2–14)
MONOCYTES NFR BLD AUTO: 4.8 % — SIGNIFICANT CHANGE UP (ref 2–14)
NEUTROPHILS # BLD AUTO: 5.91 K/UL — SIGNIFICANT CHANGE UP (ref 1.8–7.4)
NEUTROPHILS # BLD AUTO: 5.91 K/UL — SIGNIFICANT CHANGE UP (ref 1.8–7.4)
NEUTROPHILS NFR BLD AUTO: 52.9 % — SIGNIFICANT CHANGE UP (ref 43–77)
NEUTROPHILS NFR BLD AUTO: 52.9 % — SIGNIFICANT CHANGE UP (ref 43–77)
NRBC # BLD: 0 /100 WBCS — SIGNIFICANT CHANGE UP (ref 0–0)
NRBC # BLD: 0 /100 WBCS — SIGNIFICANT CHANGE UP (ref 0–0)
NRBC # FLD: 0 K/UL — SIGNIFICANT CHANGE UP (ref 0–0)
NRBC # FLD: 0 K/UL — SIGNIFICANT CHANGE UP (ref 0–0)
PHOSPHATE SERPL-MCNC: 4.7 MG/DL — HIGH (ref 2.5–4.5)
PHOSPHATE SERPL-MCNC: 4.7 MG/DL — HIGH (ref 2.5–4.5)
PLATELET # BLD AUTO: 411 K/UL — HIGH (ref 150–400)
PLATELET # BLD AUTO: 411 K/UL — HIGH (ref 150–400)
POTASSIUM SERPL-MCNC: 3.9 MMOL/L — SIGNIFICANT CHANGE UP (ref 3.5–5.3)
POTASSIUM SERPL-MCNC: 3.9 MMOL/L — SIGNIFICANT CHANGE UP (ref 3.5–5.3)
POTASSIUM SERPL-SCNC: 3.9 MMOL/L — SIGNIFICANT CHANGE UP (ref 3.5–5.3)
POTASSIUM SERPL-SCNC: 3.9 MMOL/L — SIGNIFICANT CHANGE UP (ref 3.5–5.3)
RBC # BLD: 3.27 M/UL — LOW (ref 3.8–5.2)
RBC # BLD: 3.27 M/UL — LOW (ref 3.8–5.2)
RBC # FLD: 13.5 % — SIGNIFICANT CHANGE UP (ref 10.3–14.5)
RBC # FLD: 13.5 % — SIGNIFICANT CHANGE UP (ref 10.3–14.5)
SODIUM SERPL-SCNC: 143 MMOL/L — SIGNIFICANT CHANGE UP (ref 135–145)
SODIUM SERPL-SCNC: 143 MMOL/L — SIGNIFICANT CHANGE UP (ref 135–145)
WBC # BLD: 11.18 K/UL — HIGH (ref 3.8–10.5)
WBC # BLD: 11.18 K/UL — HIGH (ref 3.8–10.5)
WBC # FLD AUTO: 11.18 K/UL — HIGH (ref 3.8–10.5)
WBC # FLD AUTO: 11.18 K/UL — HIGH (ref 3.8–10.5)

## 2023-12-27 PROCEDURE — 99232 SBSQ HOSP IP/OBS MODERATE 35: CPT

## 2023-12-27 RX ADMIN — INSULIN GLARGINE 58 UNIT(S): 100 INJECTION, SOLUTION SUBCUTANEOUS at 21:57

## 2023-12-27 RX ADMIN — NALOXEGOL OXALATE 25 MILLIGRAM(S): 12.5 TABLET, FILM COATED ORAL at 13:15

## 2023-12-27 RX ADMIN — Medication 2: at 17:55

## 2023-12-27 RX ADMIN — Medication 50 MILLIGRAM(S): at 13:15

## 2023-12-27 RX ADMIN — Medication 6 MILLIGRAM(S): at 22:12

## 2023-12-27 RX ADMIN — OXYCODONE HYDROCHLORIDE 15 MILLIGRAM(S): 5 TABLET ORAL at 10:31

## 2023-12-27 RX ADMIN — Medication 10 MILLIGRAM(S): at 06:10

## 2023-12-27 RX ADMIN — OXYCODONE HYDROCHLORIDE 15 MILLIGRAM(S): 5 TABLET ORAL at 17:56

## 2023-12-27 RX ADMIN — Medication 50 MILLIGRAM(S): at 22:14

## 2023-12-27 RX ADMIN — CARVEDILOL PHOSPHATE 3.12 MILLIGRAM(S): 80 CAPSULE, EXTENDED RELEASE ORAL at 06:06

## 2023-12-27 RX ADMIN — Medication 10 MILLIGRAM(S): at 13:15

## 2023-12-27 RX ADMIN — Medication 22 UNIT(S): at 09:13

## 2023-12-27 RX ADMIN — OXYCODONE HYDROCHLORIDE 15 MILLIGRAM(S): 5 TABLET ORAL at 18:26

## 2023-12-27 RX ADMIN — Medication 10 MILLIGRAM(S): at 22:25

## 2023-12-27 RX ADMIN — LISINOPRIL 5 MILLIGRAM(S): 2.5 TABLET ORAL at 06:18

## 2023-12-27 RX ADMIN — ACETAZOLAMIDE 500 MILLIGRAM(S): 250 TABLET ORAL at 06:10

## 2023-12-27 RX ADMIN — Medication 2 TABLET(S): at 11:25

## 2023-12-27 RX ADMIN — Medication 2 TABLET(S): at 10:55

## 2023-12-27 RX ADMIN — BUMETANIDE 2 MILLIGRAM(S): 0.25 INJECTION INTRAMUSCULAR; INTRAVENOUS at 06:07

## 2023-12-27 RX ADMIN — ACETAZOLAMIDE 500 MILLIGRAM(S): 250 TABLET ORAL at 17:57

## 2023-12-27 RX ADMIN — Medication 5 MILLIGRAM(S): at 13:16

## 2023-12-27 RX ADMIN — OXYCODONE HYDROCHLORIDE 15 MILLIGRAM(S): 5 TABLET ORAL at 10:01

## 2023-12-27 RX ADMIN — Medication 22 UNIT(S): at 17:56

## 2023-12-27 RX ADMIN — SPIRONOLACTONE 25 MILLIGRAM(S): 25 TABLET, FILM COATED ORAL at 06:13

## 2023-12-27 RX ADMIN — Medication 22 UNIT(S): at 13:14

## 2023-12-27 RX ADMIN — ATORVASTATIN CALCIUM 20 MILLIGRAM(S): 80 TABLET, FILM COATED ORAL at 22:11

## 2023-12-27 RX ADMIN — Medication 10 MILLIGRAM(S): at 06:11

## 2023-12-27 RX ADMIN — PANTOPRAZOLE SODIUM 40 MILLIGRAM(S): 20 TABLET, DELAYED RELEASE ORAL at 06:10

## 2023-12-27 RX ADMIN — Medication 10 MILLIGRAM(S): at 22:11

## 2023-12-27 RX ADMIN — Medication 10 MILLIGRAM(S): at 13:45

## 2023-12-27 NOTE — PROGRESS NOTE ADULT - SUBJECTIVE AND OBJECTIVE BOX
[facility] Division of [facility] Medicine  Anderson Yates MD  Pager [phone]      Patient is a 53y old  Female who presents with a chief complaint of Headache, tinnitus (26 Dec 2023 18:11)      SUBJECTIVE / OVERNIGHT EVENTS:    no acute event o/n. reports headache controlled on oral toradol. no new complaints     ADDITIONAL REVIEW OF SYSTEMS:    RESPIRATORY: No cough, wheezing, chills or hemoptysis; No shortness of breath  CARDIOVASCULAR: No chest pain, palpitations, dizziness, or leg swelling  GASTROINTESTINAL: No abdominal or epigastric pain. No nausea, vomiting, or hematemesis; No diarrhea or constipation. No melena or hematochezia.      MEDICATIONS  (STANDING):  acetaZOLAMIDE    Tablet 500 milliGRAM(s) Oral two times a day  atorvastatin 20 milliGRAM(s) Oral at bedtime  buMETAnide 2 milliGRAM(s) Oral daily  carvedilol 3.125 milliGRAM(s) Oral every 12 hours  dextrose 50% Injectable 25 Gram(s) IV Push once  ergocalciferol 59125 Unit(s) Oral <User Schedule>  insulin glargine Injectable (LANTUS) 58 Unit(s) SubCutaneous at bedtime  insulin lispro (ADMELOG) corrective regimen sliding scale   SubCutaneous three times a day before meals  insulin lispro (ADMELOG) corrective regimen sliding scale   SubCutaneous at bedtime  insulin lispro Injectable (ADMELOG) 22 Unit(s) SubCutaneous three times a day before meals  ketorolac 10 milliGRAM(s) Oral every 8 hours  lisinopril 5 milliGRAM(s) Oral daily  metoclopramide 10 milliGRAM(s) Oral every 8 hours  naloxegol 25 milliGRAM(s) Oral daily  pantoprazole    Tablet 40 milliGRAM(s) Oral before breakfast  spironolactone 25 milliGRAM(s) Oral daily    MEDICATIONS  (PRN):  acetaminophen 325 mG/butalbital 50 mG/caffeine 40 mG 2 Tablet(s) Oral every 8 hours PRN migraine  aluminum hydroxide/magnesium hydroxide/simethicone Suspension 30 milliLiter(s) Oral every 4 hours PRN Dyspepsia  baclofen 5 milliGRAM(s) Oral every 8 hours PRN Muscle Spasm  diphenhydrAMINE 50 milliGRAM(s) Oral every 8 hours PRN Rash and/or Itching  melatonin 6 milliGRAM(s) Oral at bedtime PRN Sleep  ondansetron Injectable 4 milliGRAM(s) IV Push every 8 hours PRN Nausea and/or Vomiting  oxyCODONE    IR 15 milliGRAM(s) Oral every 6 hours PRN Moderate Pain (4 - 6)      CAPILLARY BLOOD GLUCOSE      POCT Blood Glucose.: 117 mg/dL (27 Dec 2023 12:38)  POCT Blood Glucose.: 127 mg/dL (27 Dec 2023 08:34)  POCT Blood Glucose.: 190 mg/dL (26 Dec 2023 21:54)  POCT Blood Glucose.: 117 mg/dL (26 Dec 2023 17:21)    I&O's Summary      PHYSICAL EXAM:  Vital Signs Last 24 Hrs  T(C): 36.9 (27 Dec 2023 10:13), Max: 36.9 (27 Dec 2023 06:00)  T(F): 98.4 (27 Dec 2023 10:13), Max: 98.5 (27 Dec 2023 06:00)  HR: 61 (27 Dec 2023 10:13) (61 - 68)  BP: 128/72 (27 Dec 2023 10:13) (117/50 - 131/58)  BP(mean): --  RR: 18 (27 Dec 2023 10:13) (18 - 18)  SpO2: 99% (27 Dec 2023 10:13) (97% - 99%)    Parameters below as of 27 Dec 2023 10:13  Patient On (Oxygen Delivery Method): room air        CONSTITUTIONAL: NAD,  EYES: PERRLA; conjunctiva and sclera clear  ENMT: Moist oral mucosa, no pharyngeal injection or exudates;   NECK: Supple, no palpable masses;  RESPIRATORY: Normal respiratory effort; lungs are clear to auscultation bilaterally  CARDIOVASCULAR: Regular rate and rhythm, normal S1 and S2, no murmur/rub/gallop; No lower extremity edema; Peripheral pulses are 2+ bilaterally  ABDOMEN: Nontender to palpation, normoactive bowel sounds, no rebound/guarding;   MUSCLOSKELETAL:   no clubbing or cyanosis of digits; no joint swelling or tenderness to palpation  PSYCH: A+O to person, place, and time; affect appropriate  NEUROLOGY: CN 2-12 are intact and symmetric; no gross sensory deficits;   SKIN: No rashes;     LABS:                        9.7    11.18 )-----------( 411      ( 27 Dec 2023 06:20 )             30.5     12-27    143  |  113<H>  |  32<H>  ----------------------------<  166<H>  3.9   |  18<L>  |  1.29    Ca    8.0<L>      27 Dec 2023 06:20  Phos  4.7     12-27  Mg     1.80     12-27            Urinalysis Basic - ( 27 Dec 2023 06:20 )    Color: x / Appearance: x / SG: x / pH: x  Gluc: 166 mg/dL / Ketone: x  / Bili: x / Urobili: x   Blood: x / Protein: x / Nitrite: x   Leuk Esterase: x / RBC: x / WBC x   Sq Epi: x / Non Sq Epi: x / Bacteria: x          RADIOLOGY & ADDITIONAL TESTS:  Results Reviewed:   Imaging Personally Reviewed:  Electrocardiogram Personally Reviewed:    COORDINATION OF CARE:  Care Discussed with Consultants/Other Providers [Y/N]:  Prior or Outpatient Records Reviewed [Y/N]:   [facility]  Anderson Yates MD  Pager [phone]      Patient is a 53y old  Female who presents with a chief complaint of Headache, tinnitus (26 Dec 2023 18:11)      SUBJECTIVE / OVERNIGHT EVENTS:    no acute event o/n. reports headache controlled on oral toradol. no new complaints     ADDITIONAL REVIEW OF SYSTEMS:    RESPIRATORY: No cough, wheezing, chills or hemoptysis; No shortness of breath  CARDIOVASCULAR: No chest pain, palpitations, dizziness, or leg swelling  GASTROINTESTINAL: No abdominal or epigastric pain. No nausea, vomiting, or hematemesis; No diarrhea or constipation. No melena or hematochezia.      MEDICATIONS  (STANDING):  acetaZOLAMIDE    Tablet 500 milliGRAM(s) Oral two times a day  atorvastatin 20 milliGRAM(s) Oral at bedtime  buMETAnide 2 milliGRAM(s) Oral daily  carvedilol 3.125 milliGRAM(s) Oral every 12 hours  dextrose 50% Injectable 25 Gram(s) IV Push once  ergocalciferol 94273 Unit(s) Oral <User Schedule>  insulin glargine Injectable (LANTUS) 58 Unit(s) SubCutaneous at bedtime  insulin lispro (ADMELOG) corrective regimen sliding scale   SubCutaneous three times a day before meals  insulin lispro (ADMELOG) corrective regimen sliding scale   SubCutaneous at bedtime  insulin lispro Injectable (ADMELOG) 22 Unit(s) SubCutaneous three times a day before meals  ketorolac 10 milliGRAM(s) Oral every 8 hours  lisinopril 5 milliGRAM(s) Oral daily  metoclopramide 10 milliGRAM(s) Oral every 8 hours  naloxegol 25 milliGRAM(s) Oral daily  pantoprazole    Tablet 40 milliGRAM(s) Oral before breakfast  spironolactone 25 milliGRAM(s) Oral daily    MEDICATIONS  (PRN):  acetaminophen 325 mG/butalbital 50 mG/caffeine 40 mG 2 Tablet(s) Oral every 8 hours PRN migraine  aluminum hydroxide/magnesium hydroxide/simethicone Suspension 30 milliLiter(s) Oral every 4 hours PRN Dyspepsia  baclofen 5 milliGRAM(s) Oral every 8 hours PRN Muscle Spasm  diphenhydrAMINE 50 milliGRAM(s) Oral every 8 hours PRN Rash and/or Itching  melatonin 6 milliGRAM(s) Oral at bedtime PRN Sleep  ondansetron Injectable 4 milliGRAM(s) IV Push every 8 hours PRN Nausea and/or Vomiting  oxyCODONE    IR 15 milliGRAM(s) Oral every 6 hours PRN Moderate Pain (4 - 6)      CAPILLARY BLOOD GLUCOSE      POCT Blood Glucose.: 117 mg/dL (27 Dec 2023 12:38)  POCT Blood Glucose.: 127 mg/dL (27 Dec 2023 08:34)  POCT Blood Glucose.: 190 mg/dL (26 Dec 2023 21:54)  POCT Blood Glucose.: 117 mg/dL (26 Dec 2023 17:21)    I&O's Summary      PHYSICAL EXAM:  Vital Signs Last 24 Hrs  T(C): 36.9 (27 Dec 2023 10:13), Max: 36.9 (27 Dec 2023 06:00)  T(F): 98.4 (27 Dec 2023 10:13), Max: 98.5 (27 Dec 2023 06:00)  HR: 61 (27 Dec 2023 10:13) (61 - 68)  BP: 128/72 (27 Dec 2023 10:13) (117/50 - 131/58)  BP(mean): --  RR: 18 (27 Dec 2023 10:13) (18 - 18)  SpO2: 99% (27 Dec 2023 10:13) (97% - 99%)    Parameters below as of 27 Dec 2023 10:13  Patient On (Oxygen Delivery Method): room air        CONSTITUTIONAL: NAD,  EYES: PERRLA; conjunctiva and sclera clear  ENMT: Moist oral mucosa, no pharyngeal injection or exudates;   NECK: Supple, no palpable masses;  RESPIRATORY: Normal respiratory effort; lungs are clear to auscultation bilaterally  CARDIOVASCULAR: Regular rate and rhythm, normal S1 and S2, no murmur/rub/gallop; No lower extremity edema; Peripheral pulses are 2+ bilaterally  ABDOMEN: Nontender to palpation, normoactive bowel sounds, no rebound/guarding;   MUSCLOSKELETAL:   no clubbing or cyanosis of digits; no joint swelling or tenderness to palpation  PSYCH: A+O to person, place, and time; affect appropriate  NEUROLOGY: CN 2-12 are intact and symmetric; no gross sensory deficits;   SKIN: No rashes;     LABS:                        9.7    11.18 )-----------( 411      ( 27 Dec 2023 06:20 )             30.5     12-27    143  |  113<H>  |  32<H>  ----------------------------<  166<H>  3.9   |  18<L>  |  1.29    Ca    8.0<L>      27 Dec 2023 06:20  Phos  4.7     12-27  Mg     1.80     12-27            Urinalysis Basic - ( 27 Dec 2023 06:20 )    Color: x / Appearance: x / SG: x / pH: x  Gluc: 166 mg/dL / Ketone: x  / Bili: x / Urobili: x   Blood: x / Protein: x / Nitrite: x   Leuk Esterase: x / RBC: x / WBC x   Sq Epi: x / Non Sq Epi: x / Bacteria: x          RADIOLOGY & ADDITIONAL TESTS:  Results Reviewed:   Imaging Personally Reviewed:  Electrocardiogram Personally Reviewed:    COORDINATION OF CARE:  Care Discussed with Consultants/Other Providers [Y/N]:  Prior or Outpatient Records Reviewed [Y/N]:

## 2023-12-27 NOTE — PROGRESS NOTE ADULT - SUBJECTIVE AND OBJECTIVE BOX
History: denies n/v, tolerating po.  No hypoglycemia    MEDICATIONS  (STANDING):  acetaZOLAMIDE    Tablet 500 milliGRAM(s) Oral two times a day  atorvastatin 20 milliGRAM(s) Oral at bedtime  buMETAnide 2 milliGRAM(s) Oral daily  carvedilol 3.125 milliGRAM(s) Oral every 12 hours  dextrose 50% Injectable 25 Gram(s) IV Push once  ergocalciferol 44645 Unit(s) Oral <User Schedule>  insulin glargine Injectable (LANTUS) 58 Unit(s) SubCutaneous at bedtime  insulin lispro (ADMELOG) corrective regimen sliding scale   SubCutaneous three times a day before meals  insulin lispro (ADMELOG) corrective regimen sliding scale   SubCutaneous at bedtime  insulin lispro Injectable (ADMELOG) 22 Unit(s) SubCutaneous three times a day before meals  ketorolac 10 milliGRAM(s) Oral every 8 hours  lisinopril 5 milliGRAM(s) Oral daily  metoclopramide 10 milliGRAM(s) Oral every 8 hours  naloxegol 25 milliGRAM(s) Oral daily  pantoprazole    Tablet 40 milliGRAM(s) Oral before breakfast  spironolactone 25 milliGRAM(s) Oral daily    MEDICATIONS  (PRN):  acetaminophen 325 mG/butalbital 50 mG/caffeine 40 mG 2 Tablet(s) Oral every 8 hours PRN migraine  aluminum hydroxide/magnesium hydroxide/simethicone Suspension 30 milliLiter(s) Oral every 4 hours PRN Dyspepsia  baclofen 5 milliGRAM(s) Oral every 8 hours PRN Muscle Spasm  diphenhydrAMINE 50 milliGRAM(s) Oral every 8 hours PRN Rash and/or Itching  melatonin 6 milliGRAM(s) Oral at bedtime PRN Sleep  ondansetron Injectable 4 milliGRAM(s) IV Push every 8 hours PRN Nausea and/or Vomiting  oxyCODONE    IR 15 milliGRAM(s) Oral every 6 hours PRN Moderate Pain (4 - 6)      Allergies    amitriptyline (Other)    Intolerances      Review of Systems:  Constitutional: No fever  Eyes: No blurry vision  ALL OTHER SYSTEMS REVIEWED AND NEGATIVE        Vital Signs Last 24 Hrs  T(C): 36.9 (27 Dec 2023 10:13), Max: 36.9 (27 Dec 2023 06:00)  T(F): 98.4 (27 Dec 2023 10:13), Max: 98.5 (27 Dec 2023 06:00)  HR: 61 (27 Dec 2023 10:13) (61 - 68)  BP: 128/72 (27 Dec 2023 10:13) (117/50 - 131/58)  BP(mean): --  RR: 18 (27 Dec 2023 10:13) (18 - 18)  SpO2: 99% (27 Dec 2023 10:13) (97% - 99%)    Parameters below as of 27 Dec 2023 10:13  Patient On (Oxygen Delivery Method): room air      GENERAL: NAD, well-developed  EYES: No proptosis, no lid lag, anicteric  HEENT:  Atraumatic, Normocephalic, moist mucous membranes  THYROID: Normal size, no palpable nodules  RESPIRATORY: Clear to auscultation bilaterally; No rales, rhonchi, wheezing  CARDIOVASCULAR: Regular rate and rhythm; No murmurs; no peripheral edema  GI: Soft, nontender, non distended  SKIN: Dry, intact, No rashes or lesions  MUSCULOSKELETAL: Full range of motion, normal strength  NEURO: extraocular movements intact, no tremor  PSYCH: Alert and oriented x 3, normal affect, normal mood      CAPILLARY BLOOD GLUCOSE    POCT Blood Glucose.: 117 mg/dL (27 Dec 2023 12:38)  POCT Blood Glucose.: 127 mg/dL (27 Dec 2023 08:34)  POCT Blood Glucose.: 190 mg/dL (26 Dec 2023 21:54)  POCT Blood Glucose.: 117 mg/dL (26 Dec 2023 17:21)  POCT Blood Glucose.: 117 mg/dL (26 Dec 2023 17:21)  POCT Blood Glucose.: 149 mg/dL (26 Dec 2023 12:20)  POCT Blood Glucose.: 230 mg/dL (26 Dec 2023 08:55)  POCT Blood Glucose.: 106 mg/dL (25 Dec 2023 21:04)      12-27    143  |  113<H>  |  32<H>  ----------------------------<  166<H>  3.9   |  18<L>  |  1.29    Ca    8.0<L>      27 Dec 2023 06:20  Phos  4.7     12-27  Mg     1.80     12-27      Thyroid Function Tests:  12-23 @ 06:22 TSH 4.45 FreeT4 1.3 T3 -- Anti TPO -- Anti Thyroglobulin Ab -- TSI --                         History: denies n/v, tolerating po.  No hypoglycemia    MEDICATIONS  (STANDING):  acetaZOLAMIDE    Tablet 500 milliGRAM(s) Oral two times a day  atorvastatin 20 milliGRAM(s) Oral at bedtime  buMETAnide 2 milliGRAM(s) Oral daily  carvedilol 3.125 milliGRAM(s) Oral every 12 hours  dextrose 50% Injectable 25 Gram(s) IV Push once  ergocalciferol 09140 Unit(s) Oral <User Schedule>  insulin glargine Injectable (LANTUS) 58 Unit(s) SubCutaneous at bedtime  insulin lispro (ADMELOG) corrective regimen sliding scale   SubCutaneous three times a day before meals  insulin lispro (ADMELOG) corrective regimen sliding scale   SubCutaneous at bedtime  insulin lispro Injectable (ADMELOG) 22 Unit(s) SubCutaneous three times a day before meals  ketorolac 10 milliGRAM(s) Oral every 8 hours  lisinopril 5 milliGRAM(s) Oral daily  metoclopramide 10 milliGRAM(s) Oral every 8 hours  naloxegol 25 milliGRAM(s) Oral daily  pantoprazole    Tablet 40 milliGRAM(s) Oral before breakfast  spironolactone 25 milliGRAM(s) Oral daily    MEDICATIONS  (PRN):  acetaminophen 325 mG/butalbital 50 mG/caffeine 40 mG 2 Tablet(s) Oral every 8 hours PRN migraine  aluminum hydroxide/magnesium hydroxide/simethicone Suspension 30 milliLiter(s) Oral every 4 hours PRN Dyspepsia  baclofen 5 milliGRAM(s) Oral every 8 hours PRN Muscle Spasm  diphenhydrAMINE 50 milliGRAM(s) Oral every 8 hours PRN Rash and/or Itching  melatonin 6 milliGRAM(s) Oral at bedtime PRN Sleep  ondansetron Injectable 4 milliGRAM(s) IV Push every 8 hours PRN Nausea and/or Vomiting  oxyCODONE    IR 15 milliGRAM(s) Oral every 6 hours PRN Moderate Pain (4 - 6)      Allergies    amitriptyline (Other)    Intolerances      Review of Systems:  Constitutional: No fever  Eyes: No blurry vision  ALL OTHER SYSTEMS REVIEWED AND NEGATIVE        Vital Signs Last 24 Hrs  T(C): 36.9 (27 Dec 2023 10:13), Max: 36.9 (27 Dec 2023 06:00)  T(F): 98.4 (27 Dec 2023 10:13), Max: 98.5 (27 Dec 2023 06:00)  HR: 61 (27 Dec 2023 10:13) (61 - 68)  BP: 128/72 (27 Dec 2023 10:13) (117/50 - 131/58)  BP(mean): --  RR: 18 (27 Dec 2023 10:13) (18 - 18)  SpO2: 99% (27 Dec 2023 10:13) (97% - 99%)    Parameters below as of 27 Dec 2023 10:13  Patient On (Oxygen Delivery Method): room air      GENERAL: NAD, well-developed  EYES: No proptosis, no lid lag, anicteric  HEENT:  Atraumatic, Normocephalic, moist mucous membranes  THYROID: Normal size, no palpable nodules  RESPIRATORY: Clear to auscultation bilaterally; No rales, rhonchi, wheezing  CARDIOVASCULAR: Regular rate and rhythm; No murmurs; no peripheral edema  GI: Soft, nontender, non distended  SKIN: Dry, intact, No rashes or lesions  MUSCULOSKELETAL: Full range of motion, normal strength  NEURO: extraocular movements intact, no tremor  PSYCH: Alert and oriented x 3, normal affect, normal mood      CAPILLARY BLOOD GLUCOSE    POCT Blood Glucose.: 117 mg/dL (27 Dec 2023 12:38)  POCT Blood Glucose.: 127 mg/dL (27 Dec 2023 08:34)  POCT Blood Glucose.: 190 mg/dL (26 Dec 2023 21:54)  POCT Blood Glucose.: 117 mg/dL (26 Dec 2023 17:21)  POCT Blood Glucose.: 117 mg/dL (26 Dec 2023 17:21)  POCT Blood Glucose.: 149 mg/dL (26 Dec 2023 12:20)  POCT Blood Glucose.: 230 mg/dL (26 Dec 2023 08:55)  POCT Blood Glucose.: 106 mg/dL (25 Dec 2023 21:04)      12-27    143  |  113<H>  |  32<H>  ----------------------------<  166<H>  3.9   |  18<L>  |  1.29    Ca    8.0<L>      27 Dec 2023 06:20  Phos  4.7     12-27  Mg     1.80     12-27      Thyroid Function Tests:  12-23 @ 06:22 TSH 4.45 FreeT4 1.3 T3 -- Anti TPO -- Anti Thyroglobulin Ab -- TSI --

## 2023-12-27 NOTE — PROGRESS NOTE ADULT - PROBLEM SELECTOR PLAN 1
Patient reports to have band-like headache, tinnitus, blurry vision, and photophobia, consistent with her previous episode of idiopathic intracranial HTN /pseudotumor cerebri   -CT head and venogram is negative for any acute pathology   -Unable to get MRI/MRV brain, MRI orbits w/wo contrast as pt with neurostimulator which is not MRI compatible.  -ED attempted to perform LP but was not successful, Had LP on 12/11 with IR, opening pressure measured 11 cm water.   -Post LP pt has worse headache- neuro rec blood patch.   -anesthesia rec CTH and IR consult for blood path if imaging shows signs of intracranial hypotension   -Head ct with contrast 12/16: Expanded sella turcica with CSF attenuation due to near-complete empty sella versus underlying arachnoid cyst. Findings may be seen in the setting of intracranial hypertension.  -case discussed with neurology- started on trial of migraine cocktail- toradol, reglan with good response. changed both to oral 12/27. also on Esgic prn and oxy  -c/w diamox.

## 2023-12-27 NOTE — PROGRESS NOTE ADULT - ASSESSMENT
This is a 52 yo F /w a PMH of CHF, HTN, HLD,  Idiopathic intracranial hypertension, complex regional pain syndrome who presents with worsening headache. Endocrinology consulted for evaluation of type 2 diabetes.    #DM2  -Continue admelog 22 units before meals  -Continue lantus to 58 units nightly  -start moderate admelog correction scales before meals  -moderate admelog correction scales at bedtime  -carb consistent diet  -FSG before meals and before bedtime  -target glucose 100-180mg/dL  -hypoglycemia protocol in place if needed    #discharge  -follow up with PCP  -c/w home tresiba 45 units daily, admelog 12 units TID AC, metformin 1000mg BID.  -can see if patient can be transitioned to Mounjaro 2.5mg SQ once weekly for improved weight loss benefit. If unable to obtain Mounjaro, can increase ozempic to 0.5mg weekly  -can be evaluated about starting SGLT2i as an outpatient given CHF history    #r/o Cushing's disease - obesity, htn, DM2, easy bruising, hair loss, hirsutism, irregular menses  -please check 24 hr urine cortisol and 24 hour urine creatinine   -after this is done can also obtain 1mg dexamethasone suppression test    #HTN  -c/w coreg 3.125mg BID, spironolactone 25mg daily, and lisinopril 10mg daily  -BP target <130/80    #HLD  -c/w atorvastatin 20mg daily  outpatient lipid profile      Melissa Connors  Nurse Practitioner  Division of Endocrinology & Diabetes  Available via  Teams      If after 6PM or before 9AM, or on weekends/holidays, please call endocrine answering service for assistance ([phone]).  For nonurgent matters email [email or web address] for assistance. This is a 52 yo F /w a PMH of CHF, HTN, HLD,  Idiopathic intracranial hypertension, complex regional pain syndrome who presents with worsening headache. Endocrinology consulted for evaluation of type 2 diabetes.    #DM2  -Continue admelog 22 units before meals  -Continue lantus to 58 units nightly  -start moderate admelog correction scales before meals  -moderate admelog correction scales at bedtime  -carb consistent diet  -FSG before meals and before bedtime  -target glucose 100-180mg/dL  -hypoglycemia protocol in place if needed    #discharge  -follow up with PCP  -c/w home tresiba 45 units daily, admelog 12 units TID AC, metformin 1000mg BID.  -can see if patient can be transitioned to Mounjaro 2.5mg SQ once weekly for improved weight loss benefit. If unable to obtain Mounjaro, can increase ozempic to 0.5mg weekly  -can be evaluated about starting SGLT2i as an outpatient given CHF history    #r/o Cushing's disease - obesity, htn, DM2, easy bruising, hair loss, hirsutism, irregular menses  -please check 24 hr urine cortisol and 24 hour urine creatinine   -after this is done can also obtain 1mg dexamethasone suppression test    #HTN  -c/w coreg 3.125mg BID, spironolactone 25mg daily, and lisinopril 10mg daily  -BP target <130/80    #HLD  -c/w atorvastatin 20mg daily  outpatient lipid profile      Melissa Connors  Nurse Practitioner  Division of Endocrinology & Diabetes  Available via  Teams      If after 6PM or before 9AM, or on weekends/holidays, please call endocrine answering service for assistance ([phone]).  For nonurgent matters email [email or web address] for assistance.

## 2023-12-28 LAB
ANION GAP SERPL CALC-SCNC: 11 MMOL/L — SIGNIFICANT CHANGE UP (ref 7–14)
ANION GAP SERPL CALC-SCNC: 11 MMOL/L — SIGNIFICANT CHANGE UP (ref 7–14)
BASOPHILS # BLD AUTO: 0.05 K/UL — SIGNIFICANT CHANGE UP (ref 0–0.2)
BASOPHILS # BLD AUTO: 0.05 K/UL — SIGNIFICANT CHANGE UP (ref 0–0.2)
BASOPHILS NFR BLD AUTO: 0.4 % — SIGNIFICANT CHANGE UP (ref 0–2)
BASOPHILS NFR BLD AUTO: 0.4 % — SIGNIFICANT CHANGE UP (ref 0–2)
BUN SERPL-MCNC: 31 MG/DL — HIGH (ref 7–23)
BUN SERPL-MCNC: 31 MG/DL — HIGH (ref 7–23)
CALCIUM SERPL-MCNC: 7.9 MG/DL — LOW (ref 8.4–10.5)
CALCIUM SERPL-MCNC: 7.9 MG/DL — LOW (ref 8.4–10.5)
CHLORIDE SERPL-SCNC: 109 MMOL/L — HIGH (ref 98–107)
CHLORIDE SERPL-SCNC: 109 MMOL/L — HIGH (ref 98–107)
CO2 SERPL-SCNC: 20 MMOL/L — LOW (ref 22–31)
CO2 SERPL-SCNC: 20 MMOL/L — LOW (ref 22–31)
CORTIS 24H UR-MRATE: 9.7 MCG/24 H — SIGNIFICANT CHANGE UP (ref 3.5–45)
CORTIS 24H UR-MRATE: 9.7 MCG/24 H — SIGNIFICANT CHANGE UP (ref 3.5–45)
CORTIS UR-MCNC: 1650 ML — SIGNIFICANT CHANGE UP
CORTIS UR-MCNC: 1650 ML — SIGNIFICANT CHANGE UP
CORTIS UR-MCNC: 24 H — SIGNIFICANT CHANGE UP
CORTIS UR-MCNC: 24 H — SIGNIFICANT CHANGE UP
CREAT SERPL-MCNC: 1.07 MG/DL — SIGNIFICANT CHANGE UP (ref 0.5–1.3)
CREAT SERPL-MCNC: 1.07 MG/DL — SIGNIFICANT CHANGE UP (ref 0.5–1.3)
EGFR: 62 ML/MIN/1.73M2 — SIGNIFICANT CHANGE UP
EGFR: 62 ML/MIN/1.73M2 — SIGNIFICANT CHANGE UP
EOSINOPHIL # BLD AUTO: 0.34 K/UL — SIGNIFICANT CHANGE UP (ref 0–0.5)
EOSINOPHIL # BLD AUTO: 0.34 K/UL — SIGNIFICANT CHANGE UP (ref 0–0.5)
EOSINOPHIL NFR BLD AUTO: 2.8 % — SIGNIFICANT CHANGE UP (ref 0–6)
EOSINOPHIL NFR BLD AUTO: 2.8 % — SIGNIFICANT CHANGE UP (ref 0–6)
GLUCOSE BLDC GLUCOMTR-MCNC: 108 MG/DL — HIGH (ref 70–99)
GLUCOSE BLDC GLUCOMTR-MCNC: 108 MG/DL — HIGH (ref 70–99)
GLUCOSE BLDC GLUCOMTR-MCNC: 132 MG/DL — HIGH (ref 70–99)
GLUCOSE BLDC GLUCOMTR-MCNC: 132 MG/DL — HIGH (ref 70–99)
GLUCOSE BLDC GLUCOMTR-MCNC: 82 MG/DL — SIGNIFICANT CHANGE UP (ref 70–99)
GLUCOSE BLDC GLUCOMTR-MCNC: 82 MG/DL — SIGNIFICANT CHANGE UP (ref 70–99)
GLUCOSE BLDC GLUCOMTR-MCNC: 95 MG/DL — SIGNIFICANT CHANGE UP (ref 70–99)
GLUCOSE BLDC GLUCOMTR-MCNC: 95 MG/DL — SIGNIFICANT CHANGE UP (ref 70–99)
GLUCOSE SERPL-MCNC: 110 MG/DL — HIGH (ref 70–99)
GLUCOSE SERPL-MCNC: 110 MG/DL — HIGH (ref 70–99)
HCT VFR BLD CALC: 31 % — LOW (ref 34.5–45)
HCT VFR BLD CALC: 31 % — LOW (ref 34.5–45)
HGB BLD-MCNC: 9.8 G/DL — LOW (ref 11.5–15.5)
HGB BLD-MCNC: 9.8 G/DL — LOW (ref 11.5–15.5)
IANC: 6.37 K/UL — SIGNIFICANT CHANGE UP (ref 1.8–7.4)
IANC: 6.37 K/UL — SIGNIFICANT CHANGE UP (ref 1.8–7.4)
IMM GRANULOCYTES NFR BLD AUTO: 0.3 % — SIGNIFICANT CHANGE UP (ref 0–0.9)
IMM GRANULOCYTES NFR BLD AUTO: 0.3 % — SIGNIFICANT CHANGE UP (ref 0–0.9)
LYMPHOCYTES # BLD AUTO: 38.5 % — SIGNIFICANT CHANGE UP (ref 13–44)
LYMPHOCYTES # BLD AUTO: 38.5 % — SIGNIFICANT CHANGE UP (ref 13–44)
LYMPHOCYTES # BLD AUTO: 4.7 K/UL — HIGH (ref 1–3.3)
LYMPHOCYTES # BLD AUTO: 4.7 K/UL — HIGH (ref 1–3.3)
MAGNESIUM SERPL-MCNC: 1.8 MG/DL — SIGNIFICANT CHANGE UP (ref 1.6–2.6)
MAGNESIUM SERPL-MCNC: 1.8 MG/DL — SIGNIFICANT CHANGE UP (ref 1.6–2.6)
MCHC RBC-ENTMCNC: 29.2 PG — SIGNIFICANT CHANGE UP (ref 27–34)
MCHC RBC-ENTMCNC: 29.2 PG — SIGNIFICANT CHANGE UP (ref 27–34)
MCHC RBC-ENTMCNC: 31.6 GM/DL — LOW (ref 32–36)
MCHC RBC-ENTMCNC: 31.6 GM/DL — LOW (ref 32–36)
MCV RBC AUTO: 92.3 FL — SIGNIFICANT CHANGE UP (ref 80–100)
MCV RBC AUTO: 92.3 FL — SIGNIFICANT CHANGE UP (ref 80–100)
MONOCYTES # BLD AUTO: 0.71 K/UL — SIGNIFICANT CHANGE UP (ref 0–0.9)
MONOCYTES # BLD AUTO: 0.71 K/UL — SIGNIFICANT CHANGE UP (ref 0–0.9)
MONOCYTES NFR BLD AUTO: 5.8 % — SIGNIFICANT CHANGE UP (ref 2–14)
MONOCYTES NFR BLD AUTO: 5.8 % — SIGNIFICANT CHANGE UP (ref 2–14)
NEUTROPHILS # BLD AUTO: 6.37 K/UL — SIGNIFICANT CHANGE UP (ref 1.8–7.4)
NEUTROPHILS # BLD AUTO: 6.37 K/UL — SIGNIFICANT CHANGE UP (ref 1.8–7.4)
NEUTROPHILS NFR BLD AUTO: 52.2 % — SIGNIFICANT CHANGE UP (ref 43–77)
NEUTROPHILS NFR BLD AUTO: 52.2 % — SIGNIFICANT CHANGE UP (ref 43–77)
NRBC # BLD: 0 /100 WBCS — SIGNIFICANT CHANGE UP (ref 0–0)
NRBC # BLD: 0 /100 WBCS — SIGNIFICANT CHANGE UP (ref 0–0)
NRBC # FLD: 0 K/UL — SIGNIFICANT CHANGE UP (ref 0–0)
NRBC # FLD: 0 K/UL — SIGNIFICANT CHANGE UP (ref 0–0)
PHOSPHATE SERPL-MCNC: 5.1 MG/DL — HIGH (ref 2.5–4.5)
PHOSPHATE SERPL-MCNC: 5.1 MG/DL — HIGH (ref 2.5–4.5)
PLATELET # BLD AUTO: 413 K/UL — HIGH (ref 150–400)
PLATELET # BLD AUTO: 413 K/UL — HIGH (ref 150–400)
POTASSIUM SERPL-MCNC: 3.5 MMOL/L — SIGNIFICANT CHANGE UP (ref 3.5–5.3)
POTASSIUM SERPL-MCNC: 3.5 MMOL/L — SIGNIFICANT CHANGE UP (ref 3.5–5.3)
POTASSIUM SERPL-SCNC: 3.5 MMOL/L — SIGNIFICANT CHANGE UP (ref 3.5–5.3)
POTASSIUM SERPL-SCNC: 3.5 MMOL/L — SIGNIFICANT CHANGE UP (ref 3.5–5.3)
RBC # BLD: 3.36 M/UL — LOW (ref 3.8–5.2)
RBC # BLD: 3.36 M/UL — LOW (ref 3.8–5.2)
RBC # FLD: 13.6 % — SIGNIFICANT CHANGE UP (ref 10.3–14.5)
RBC # FLD: 13.6 % — SIGNIFICANT CHANGE UP (ref 10.3–14.5)
SODIUM SERPL-SCNC: 140 MMOL/L — SIGNIFICANT CHANGE UP (ref 135–145)
SODIUM SERPL-SCNC: 140 MMOL/L — SIGNIFICANT CHANGE UP (ref 135–145)
WBC # BLD: 12.21 K/UL — HIGH (ref 3.8–10.5)
WBC # BLD: 12.21 K/UL — HIGH (ref 3.8–10.5)
WBC # FLD AUTO: 12.21 K/UL — HIGH (ref 3.8–10.5)
WBC # FLD AUTO: 12.21 K/UL — HIGH (ref 3.8–10.5)

## 2023-12-28 PROCEDURE — 99232 SBSQ HOSP IP/OBS MODERATE 35: CPT

## 2023-12-28 PROCEDURE — 99239 HOSP IP/OBS DSCHRG MGMT >30: CPT

## 2023-12-28 RX ORDER — KETOROLAC TROMETHAMINE 30 MG/ML
1 SYRINGE (ML) INJECTION
Qty: 9 | Refills: 0
Start: 2023-12-28 | End: 2023-12-30

## 2023-12-28 RX ORDER — ERGOCALCIFEROL 1.25 MG/1
1 CAPSULE ORAL
Qty: 4 | Refills: 0
Start: 2023-12-28 | End: 2024-01-26

## 2023-12-28 RX ORDER — KETOROLAC TROMETHAMINE 30 MG/ML
10 SYRINGE (ML) INJECTION EVERY 8 HOURS
Refills: 0 | Status: DISCONTINUED | OUTPATIENT
Start: 2023-12-28 | End: 2023-12-29

## 2023-12-28 RX ORDER — METOCLOPRAMIDE HCL 10 MG
10 TABLET ORAL EVERY 8 HOURS
Refills: 0 | Status: DISCONTINUED | OUTPATIENT
Start: 2023-12-28 | End: 2023-12-29

## 2023-12-28 RX ORDER — OXYCODONE HYDROCHLORIDE 5 MG/1
15 TABLET ORAL EVERY 6 HOURS
Refills: 0 | Status: DISCONTINUED | OUTPATIENT
Start: 2023-12-29 | End: 2023-12-29

## 2023-12-28 RX ORDER — INSULIN DEGLUDEC 100 U/ML
54 INJECTION, SOLUTION SUBCUTANEOUS
Qty: 1 | Refills: 0
Start: 2023-12-28 | End: 2024-01-26

## 2023-12-28 RX ORDER — OXYCODONE HYDROCHLORIDE 5 MG/1
1 TABLET ORAL
Qty: 12 | Refills: 0
Start: 2023-12-28 | End: 2023-12-30

## 2023-12-28 RX ORDER — LISINOPRIL 2.5 MG/1
1 TABLET ORAL
Qty: 30 | Refills: 0
Start: 2023-12-28 | End: 2024-01-26

## 2023-12-28 RX ORDER — INSULIN LISPRO 100/ML
18 VIAL (ML) SUBCUTANEOUS
Refills: 0 | Status: DISCONTINUED | OUTPATIENT
Start: 2023-12-28 | End: 2023-12-29

## 2023-12-28 RX ORDER — INSULIN GLARGINE 100 [IU]/ML
54 INJECTION, SOLUTION SUBCUTANEOUS AT BEDTIME
Refills: 0 | Status: DISCONTINUED | OUTPATIENT
Start: 2023-12-28 | End: 2023-12-29

## 2023-12-28 RX ORDER — METOCLOPRAMIDE HCL 10 MG
1 TABLET ORAL
Qty: 9 | Refills: 0
Start: 2023-12-28 | End: 2023-12-30

## 2023-12-28 RX ORDER — SEMAGLUTIDE 0.68 MG/ML
0.25 INJECTION, SOLUTION SUBCUTANEOUS
Refills: 0 | DISCHARGE

## 2023-12-28 RX ORDER — TIRZEPATIDE 15 MG/.5ML
2.5 INJECTION, SOLUTION SUBCUTANEOUS
Qty: 4 | Refills: 0
Start: 2023-12-28 | End: 2024-01-26

## 2023-12-28 RX ORDER — SEMAGLUTIDE 0.68 MG/ML
0.5 INJECTION, SOLUTION SUBCUTANEOUS
Qty: 1 | Refills: 0
Start: 2023-12-28 | End: 2024-01-26

## 2023-12-28 RX ORDER — ATORVASTATIN CALCIUM 80 MG/1
1 TABLET, FILM COATED ORAL
Qty: 30 | Refills: 0
Start: 2023-12-28 | End: 2024-01-26

## 2023-12-28 RX ORDER — CALCIUM ACETATE 667 MG
667 TABLET ORAL
Refills: 0 | Status: COMPLETED | OUTPATIENT
Start: 2023-12-28 | End: 2023-12-29

## 2023-12-28 RX ORDER — DIPHENHYDRAMINE HCL 50 MG
1 CAPSULE ORAL
Qty: 9 | Refills: 0
Start: 2023-12-28 | End: 2023-12-30

## 2023-12-28 RX ORDER — CARVEDILOL PHOSPHATE 80 MG/1
1 CAPSULE, EXTENDED RELEASE ORAL
Qty: 60 | Refills: 0
Start: 2023-12-28 | End: 2024-01-26

## 2023-12-28 RX ORDER — ACETAZOLAMIDE 250 MG/1
2 TABLET ORAL
Qty: 120 | Refills: 0
Start: 2023-12-28 | End: 2024-01-26

## 2023-12-28 RX ORDER — INSULIN ASPART 100 [IU]/ML
15 INJECTION, SOLUTION SUBCUTANEOUS
Qty: 1 | Refills: 0
Start: 2023-12-28 | End: 2024-01-26

## 2023-12-28 RX ADMIN — OXYCODONE HYDROCHLORIDE 15 MILLIGRAM(S): 5 TABLET ORAL at 00:07

## 2023-12-28 RX ADMIN — SPIRONOLACTONE 25 MILLIGRAM(S): 25 TABLET, FILM COATED ORAL at 07:22

## 2023-12-28 RX ADMIN — OXYCODONE HYDROCHLORIDE 15 MILLIGRAM(S): 5 TABLET ORAL at 00:37

## 2023-12-28 RX ADMIN — OXYCODONE HYDROCHLORIDE 15 MILLIGRAM(S): 5 TABLET ORAL at 07:38

## 2023-12-28 RX ADMIN — ACETAZOLAMIDE 500 MILLIGRAM(S): 250 TABLET ORAL at 18:35

## 2023-12-28 RX ADMIN — Medication 50 MILLIGRAM(S): at 22:50

## 2023-12-28 RX ADMIN — Medication 10 MILLIGRAM(S): at 12:03

## 2023-12-28 RX ADMIN — BUMETANIDE 2 MILLIGRAM(S): 0.25 INJECTION INTRAMUSCULAR; INTRAVENOUS at 07:21

## 2023-12-28 RX ADMIN — Medication 6 MILLIGRAM(S): at 22:50

## 2023-12-28 RX ADMIN — ACETAZOLAMIDE 500 MILLIGRAM(S): 250 TABLET ORAL at 07:22

## 2023-12-28 RX ADMIN — Medication 667 MILLIGRAM(S): at 13:18

## 2023-12-28 RX ADMIN — Medication 10 MILLIGRAM(S): at 07:20

## 2023-12-28 RX ADMIN — OXYCODONE HYDROCHLORIDE 15 MILLIGRAM(S): 5 TABLET ORAL at 13:40

## 2023-12-28 RX ADMIN — OXYCODONE HYDROCHLORIDE 15 MILLIGRAM(S): 5 TABLET ORAL at 08:08

## 2023-12-28 RX ADMIN — Medication 18 UNIT(S): at 18:36

## 2023-12-28 RX ADMIN — NALOXEGOL OXALATE 25 MILLIGRAM(S): 12.5 TABLET, FILM COATED ORAL at 12:03

## 2023-12-28 RX ADMIN — Medication 22 UNIT(S): at 09:21

## 2023-12-28 RX ADMIN — Medication 667 MILLIGRAM(S): at 18:38

## 2023-12-28 RX ADMIN — LISINOPRIL 5 MILLIGRAM(S): 2.5 TABLET ORAL at 07:22

## 2023-12-28 RX ADMIN — ATORVASTATIN CALCIUM 20 MILLIGRAM(S): 80 TABLET, FILM COATED ORAL at 22:39

## 2023-12-28 RX ADMIN — Medication 10 MILLIGRAM(S): at 22:58

## 2023-12-28 RX ADMIN — OXYCODONE HYDROCHLORIDE 15 MILLIGRAM(S): 5 TABLET ORAL at 20:11

## 2023-12-28 RX ADMIN — Medication 10 MILLIGRAM(S): at 22:39

## 2023-12-28 RX ADMIN — PANTOPRAZOLE SODIUM 40 MILLIGRAM(S): 20 TABLET, DELAYED RELEASE ORAL at 07:22

## 2023-12-28 RX ADMIN — Medication 50 MILLIGRAM(S): at 07:23

## 2023-12-28 RX ADMIN — OXYCODONE HYDROCHLORIDE 15 MILLIGRAM(S): 5 TABLET ORAL at 14:40

## 2023-12-28 RX ADMIN — OXYCODONE HYDROCHLORIDE 15 MILLIGRAM(S): 5 TABLET ORAL at 19:41

## 2023-12-28 RX ADMIN — Medication 10 MILLIGRAM(S): at 07:21

## 2023-12-28 RX ADMIN — INSULIN GLARGINE 54 UNIT(S): 100 INJECTION, SOLUTION SUBCUTANEOUS at 22:40

## 2023-12-28 RX ADMIN — Medication 10 MILLIGRAM(S): at 13:18

## 2023-12-28 NOTE — PROGRESS NOTE ADULT - ASSESSMENT
This is a 52 yo F /w a PMH of CHF, HTN, HLD,  Idiopathic intracranial hypertension, complex regional pain syndrome who presents with worsening headache. Endocrinology consulted for evaluation of type 2 diabetes.    #DM2  -Continue admelog 22 units before meals  -Continue lantus to 58 units nightly  -start moderate admelog correction scales before meals  -moderate admelog correction scales at bedtime  -carb consistent diet  -FSG before meals and before bedtime  -target glucose 100-180mg/dL  -hypoglycemia protocol in place if needed    #discharge  -follow up with PCP  -c/w tresiba at 54 units daily, and admelog 15 units TID AC, metformin 1000mg BID.  -Mounjaro not covered therefore  can increase ozempic to 0.5mg weekly  -can be evaluated about starting SGLT2i as an outpatient given CHF history    #r/o Cushing's disease - obesity, htn, DM2, easy bruising, hair loss, hirsutism, irregular menses  cortisol 0.5, ACTH supressed after dexamethasone given.  Most likely not Cushing's disease    #HTN  -c/w coreg 3.125mg BID, spironolactone 25mg daily, and lisinopril 10mg daily  -BP target <130/80    #HLD  -c/w atorvastatin 20mg daily  outpatient lipid profile      Melissa Connors  Nurse Practitioner  Division of Endocrinology & Diabetes  Available via  Teams      If after 6PM or before 9AM, or on weekends/holidays, please call endocrine answering service for assistance ([phone]).  For nonurgent matters email [email or web address] for assistance. This is a 54 yo F /w a PMH of CHF, HTN, HLD,  Idiopathic intracranial hypertension, complex regional pain syndrome who presents with worsening headache. Endocrinology consulted for evaluation of type 2 diabetes.    #DM2  -Continue admelog 22 units before meals  -Continue lantus to 58 units nightly  -start moderate admelog correction scales before meals  -moderate admelog correction scales at bedtime  -carb consistent diet  -FSG before meals and before bedtime  -target glucose 100-180mg/dL  -hypoglycemia protocol in place if needed    #discharge  -follow up with PCP  -c/w tresiba at 54 units daily, and admelog 15 units TID AC, metformin 1000mg BID.  -Mounjaro not covered therefore  can increase ozempic to 0.5mg weekly  -can be evaluated about starting SGLT2i as an outpatient given CHF history    #r/o Cushing's disease - obesity, htn, DM2, easy bruising, hair loss, hirsutism, irregular menses  cortisol 0.5, ACTH supressed after dexamethasone given.  Most likely not Cushing's disease    #HTN  -c/w coreg 3.125mg BID, spironolactone 25mg daily, and lisinopril 10mg daily  -BP target <130/80    #HLD  -c/w atorvastatin 20mg daily  outpatient lipid profile      Melissa Connors  Nurse Practitioner  Division of Endocrinology & Diabetes  Available via  Teams      If after 6PM or before 9AM, or on weekends/holidays, please call endocrine answering service for assistance ([phone]).  For nonurgent matters email [email or web address] for assistance.

## 2023-12-28 NOTE — CHART NOTE - NSCHARTNOTEFT_GEN_A_CORE
pt seen and examined. vitals, labs reviewed. pt reports pain controlled on oral meds. no new complaints    pt is stable for discharge home today. total time spent on dc 42min

## 2023-12-28 NOTE — PROGRESS NOTE ADULT - SUBJECTIVE AND OBJECTIVE BOX
History: denies n/v, tolerating po.  No hypoglycemia  Wants to be dc'd today    MEDICATIONS  (STANDING):  acetaZOLAMIDE    Tablet 500 milliGRAM(s) Oral two times a day  atorvastatin 20 milliGRAM(s) Oral at bedtime  buMETAnide 2 milliGRAM(s) Oral daily  carvedilol 3.125 milliGRAM(s) Oral every 12 hours  dextrose 50% Injectable 25 Gram(s) IV Push once  ergocalciferol 55212 Unit(s) Oral <User Schedule>  insulin glargine Injectable (LANTUS) 58 Unit(s) SubCutaneous at bedtime  insulin lispro (ADMELOG) corrective regimen sliding scale   SubCutaneous three times a day before meals  insulin lispro (ADMELOG) corrective regimen sliding scale   SubCutaneous at bedtime  insulin lispro Injectable (ADMELOG) 22 Unit(s) SubCutaneous three times a day before meals  ketorolac 10 milliGRAM(s) Oral every 8 hours  lisinopril 5 milliGRAM(s) Oral daily  metoclopramide 10 milliGRAM(s) Oral every 8 hours  naloxegol 25 milliGRAM(s) Oral daily  pantoprazole    Tablet 40 milliGRAM(s) Oral before breakfast  spironolactone 25 milliGRAM(s) Oral daily    MEDICATIONS  (PRN):  acetaminophen 325 mG/butalbital 50 mG/caffeine 40 mG 2 Tablet(s) Oral every 8 hours PRN migraine  aluminum hydroxide/magnesium hydroxide/simethicone Suspension 30 milliLiter(s) Oral every 4 hours PRN Dyspepsia  baclofen 5 milliGRAM(s) Oral every 8 hours PRN Muscle Spasm  diphenhydrAMINE 50 milliGRAM(s) Oral every 8 hours PRN Rash and/or Itching  melatonin 6 milliGRAM(s) Oral at bedtime PRN Sleep  ondansetron Injectable 4 milliGRAM(s) IV Push every 8 hours PRN Nausea and/or Vomiting  oxyCODONE    IR 15 milliGRAM(s) Oral every 6 hours PRN Moderate Pain (4 - 6)      Allergies    amitriptyline (Other)    Intolerances      Review of Systems:  Constitutional: No fever  Eyes: No blurry vision  ALL OTHER SYSTEMS REVIEWED AND NEGATIVE      Vital Signs Last 24 Hrs  T(C): 36.3 (28 Dec 2023 10:51), Max: 36.7 (27 Dec 2023 20:15)  T(F): 97.3 (28 Dec 2023 10:51), Max: 98 (27 Dec 2023 20:15)  HR: 58 (28 Dec 2023 13:30) (56 - 72)  BP: 110/62 (28 Dec 2023 13:30) (97/59 - 123/63)  BP(mean): --  RR: 18 (28 Dec 2023 10:51) (18 - 18)  SpO2: 100% (28 Dec 2023 10:51) (97% - 100%)    Parameters below as of 28 Dec 2023 10:51  Patient On (Oxygen Delivery Method): room air      GENERAL: NAD, well-developed  EYES: No proptosis, no lid lag, anicteric  GI: Soft, nontender, non distended  SKIN: Dry, intact, No rashes or lesion  PSYCH: Alert and oriented x 3, normal affect, normal mood        CAPILLARY BLOOD GLUCOSE    POCT Blood Glucose.: 82 mg/dL (28 Dec 2023 12:25)  POCT Blood Glucose.: 95 mg/dL (28 Dec 2023 09:06)  POCT Blood Glucose.: 185 mg/dL (27 Dec 2023 21:07)  POCT Blood Glucose.: 117 mg/dL (27 Dec 2023 12:38)  POCT Blood Glucose.: 127 mg/dL (27 Dec 2023 08:34)  POCT Blood Glucose.: 190 mg/dL (26 Dec 2023 21:54)  POCT Blood Glucose.: 117 mg/dL (26 Dec 2023 17:21)  POCT Blood Glucose.: 117 mg/dL (26 Dec 2023 17:21)  POCT Blood Glucose.: 149 mg/dL (26 Dec 2023 12:20)  POCT Blood Glucose.: 230 mg/dL (26 Dec 2023 08:55)  POCT Blood Glucose.: 106 mg/dL (25 Dec 2023 21:04)      12-28    140  |  109<H>  |  31<H>  ----------------------------<  110<H>  3.5   |  20<L>  |  1.07    Ca    7.9<L>      28 Dec 2023 06:51  Phos  5.1     12-28  Mg     1.80     12-28          Thyroid Function Tests:  12-23 @ 06:22 TSH 4.45 FreeT4 1.3 T3 -- Anti TPO -- Anti Thyroglobulin Ab -- TSI --                         History: denies n/v, tolerating po.  No hypoglycemia  Wants to be dc'd today    MEDICATIONS  (STANDING):  acetaZOLAMIDE    Tablet 500 milliGRAM(s) Oral two times a day  atorvastatin 20 milliGRAM(s) Oral at bedtime  buMETAnide 2 milliGRAM(s) Oral daily  carvedilol 3.125 milliGRAM(s) Oral every 12 hours  dextrose 50% Injectable 25 Gram(s) IV Push once  ergocalciferol 23202 Unit(s) Oral <User Schedule>  insulin glargine Injectable (LANTUS) 58 Unit(s) SubCutaneous at bedtime  insulin lispro (ADMELOG) corrective regimen sliding scale   SubCutaneous three times a day before meals  insulin lispro (ADMELOG) corrective regimen sliding scale   SubCutaneous at bedtime  insulin lispro Injectable (ADMELOG) 22 Unit(s) SubCutaneous three times a day before meals  ketorolac 10 milliGRAM(s) Oral every 8 hours  lisinopril 5 milliGRAM(s) Oral daily  metoclopramide 10 milliGRAM(s) Oral every 8 hours  naloxegol 25 milliGRAM(s) Oral daily  pantoprazole    Tablet 40 milliGRAM(s) Oral before breakfast  spironolactone 25 milliGRAM(s) Oral daily    MEDICATIONS  (PRN):  acetaminophen 325 mG/butalbital 50 mG/caffeine 40 mG 2 Tablet(s) Oral every 8 hours PRN migraine  aluminum hydroxide/magnesium hydroxide/simethicone Suspension 30 milliLiter(s) Oral every 4 hours PRN Dyspepsia  baclofen 5 milliGRAM(s) Oral every 8 hours PRN Muscle Spasm  diphenhydrAMINE 50 milliGRAM(s) Oral every 8 hours PRN Rash and/or Itching  melatonin 6 milliGRAM(s) Oral at bedtime PRN Sleep  ondansetron Injectable 4 milliGRAM(s) IV Push every 8 hours PRN Nausea and/or Vomiting  oxyCODONE    IR 15 milliGRAM(s) Oral every 6 hours PRN Moderate Pain (4 - 6)      Allergies    amitriptyline (Other)    Intolerances      Review of Systems:  Constitutional: No fever  Eyes: No blurry vision  ALL OTHER SYSTEMS REVIEWED AND NEGATIVE      Vital Signs Last 24 Hrs  T(C): 36.3 (28 Dec 2023 10:51), Max: 36.7 (27 Dec 2023 20:15)  T(F): 97.3 (28 Dec 2023 10:51), Max: 98 (27 Dec 2023 20:15)  HR: 58 (28 Dec 2023 13:30) (56 - 72)  BP: 110/62 (28 Dec 2023 13:30) (97/59 - 123/63)  BP(mean): --  RR: 18 (28 Dec 2023 10:51) (18 - 18)  SpO2: 100% (28 Dec 2023 10:51) (97% - 100%)    Parameters below as of 28 Dec 2023 10:51  Patient On (Oxygen Delivery Method): room air      GENERAL: NAD, well-developed  EYES: No proptosis, no lid lag, anicteric  GI: Soft, nontender, non distended  SKIN: Dry, intact, No rashes or lesion  PSYCH: Alert and oriented x 3, normal affect, normal mood        CAPILLARY BLOOD GLUCOSE    POCT Blood Glucose.: 82 mg/dL (28 Dec 2023 12:25)  POCT Blood Glucose.: 95 mg/dL (28 Dec 2023 09:06)  POCT Blood Glucose.: 185 mg/dL (27 Dec 2023 21:07)  POCT Blood Glucose.: 117 mg/dL (27 Dec 2023 12:38)  POCT Blood Glucose.: 127 mg/dL (27 Dec 2023 08:34)  POCT Blood Glucose.: 190 mg/dL (26 Dec 2023 21:54)  POCT Blood Glucose.: 117 mg/dL (26 Dec 2023 17:21)  POCT Blood Glucose.: 117 mg/dL (26 Dec 2023 17:21)  POCT Blood Glucose.: 149 mg/dL (26 Dec 2023 12:20)  POCT Blood Glucose.: 230 mg/dL (26 Dec 2023 08:55)  POCT Blood Glucose.: 106 mg/dL (25 Dec 2023 21:04)      12-28    140  |  109<H>  |  31<H>  ----------------------------<  110<H>  3.5   |  20<L>  |  1.07    Ca    7.9<L>      28 Dec 2023 06:51  Phos  5.1     12-28  Mg     1.80     12-28          Thyroid Function Tests:  12-23 @ 06:22 TSH 4.45 FreeT4 1.3 T3 -- Anti TPO -- Anti Thyroglobulin Ab -- TSI --

## 2023-12-29 VITALS
SYSTOLIC BLOOD PRESSURE: 99 MMHG | RESPIRATION RATE: 18 BRPM | DIASTOLIC BLOOD PRESSURE: 54 MMHG | OXYGEN SATURATION: 99 % | TEMPERATURE: 98 F | HEART RATE: 54 BPM

## 2023-12-29 LAB
ALBUMIN SERPL ELPH-MCNC: 3.1 G/DL — LOW (ref 3.3–5)
ALBUMIN SERPL ELPH-MCNC: 3.1 G/DL — LOW (ref 3.3–5)
ALP SERPL-CCNC: 105 U/L — SIGNIFICANT CHANGE UP (ref 40–120)
ALP SERPL-CCNC: 105 U/L — SIGNIFICANT CHANGE UP (ref 40–120)
ALT FLD-CCNC: 19 U/L — SIGNIFICANT CHANGE UP (ref 4–33)
ALT FLD-CCNC: 19 U/L — SIGNIFICANT CHANGE UP (ref 4–33)
ANION GAP SERPL CALC-SCNC: 10 MMOL/L — SIGNIFICANT CHANGE UP (ref 7–14)
ANION GAP SERPL CALC-SCNC: 10 MMOL/L — SIGNIFICANT CHANGE UP (ref 7–14)
AST SERPL-CCNC: 16 U/L — SIGNIFICANT CHANGE UP (ref 4–32)
AST SERPL-CCNC: 16 U/L — SIGNIFICANT CHANGE UP (ref 4–32)
BILIRUB SERPL-MCNC: 0.2 MG/DL — SIGNIFICANT CHANGE UP (ref 0.2–1.2)
BILIRUB SERPL-MCNC: 0.2 MG/DL — SIGNIFICANT CHANGE UP (ref 0.2–1.2)
BUN SERPL-MCNC: 28 MG/DL — HIGH (ref 7–23)
BUN SERPL-MCNC: 28 MG/DL — HIGH (ref 7–23)
CALCIUM SERPL-MCNC: 8 MG/DL — LOW (ref 8.4–10.5)
CALCIUM SERPL-MCNC: 8 MG/DL — LOW (ref 8.4–10.5)
CHLORIDE SERPL-SCNC: 109 MMOL/L — HIGH (ref 98–107)
CHLORIDE SERPL-SCNC: 109 MMOL/L — HIGH (ref 98–107)
CO2 SERPL-SCNC: 21 MMOL/L — LOW (ref 22–31)
CO2 SERPL-SCNC: 21 MMOL/L — LOW (ref 22–31)
CREAT SERPL-MCNC: 1.16 MG/DL — SIGNIFICANT CHANGE UP (ref 0.5–1.3)
CREAT SERPL-MCNC: 1.16 MG/DL — SIGNIFICANT CHANGE UP (ref 0.5–1.3)
EGFR: 56 ML/MIN/1.73M2 — LOW
EGFR: 56 ML/MIN/1.73M2 — LOW
GLUCOSE BLDC GLUCOMTR-MCNC: 97 MG/DL — SIGNIFICANT CHANGE UP (ref 70–99)
GLUCOSE BLDC GLUCOMTR-MCNC: 97 MG/DL — SIGNIFICANT CHANGE UP (ref 70–99)
GLUCOSE SERPL-MCNC: 87 MG/DL — SIGNIFICANT CHANGE UP (ref 70–99)
GLUCOSE SERPL-MCNC: 87 MG/DL — SIGNIFICANT CHANGE UP (ref 70–99)
HCT VFR BLD CALC: 31.2 % — LOW (ref 34.5–45)
HCT VFR BLD CALC: 31.2 % — LOW (ref 34.5–45)
HGB BLD-MCNC: 10 G/DL — LOW (ref 11.5–15.5)
HGB BLD-MCNC: 10 G/DL — LOW (ref 11.5–15.5)
MAGNESIUM SERPL-MCNC: 1.8 MG/DL — SIGNIFICANT CHANGE UP (ref 1.6–2.6)
MAGNESIUM SERPL-MCNC: 1.8 MG/DL — SIGNIFICANT CHANGE UP (ref 1.6–2.6)
MCHC RBC-ENTMCNC: 29.6 PG — SIGNIFICANT CHANGE UP (ref 27–34)
MCHC RBC-ENTMCNC: 29.6 PG — SIGNIFICANT CHANGE UP (ref 27–34)
MCHC RBC-ENTMCNC: 32.1 GM/DL — SIGNIFICANT CHANGE UP (ref 32–36)
MCHC RBC-ENTMCNC: 32.1 GM/DL — SIGNIFICANT CHANGE UP (ref 32–36)
MCV RBC AUTO: 92.3 FL — SIGNIFICANT CHANGE UP (ref 80–100)
MCV RBC AUTO: 92.3 FL — SIGNIFICANT CHANGE UP (ref 80–100)
NRBC # BLD: 0 /100 WBCS — SIGNIFICANT CHANGE UP (ref 0–0)
NRBC # BLD: 0 /100 WBCS — SIGNIFICANT CHANGE UP (ref 0–0)
NRBC # FLD: 0 K/UL — SIGNIFICANT CHANGE UP (ref 0–0)
NRBC # FLD: 0 K/UL — SIGNIFICANT CHANGE UP (ref 0–0)
PHOSPHATE SERPL-MCNC: 4.9 MG/DL — HIGH (ref 2.5–4.5)
PHOSPHATE SERPL-MCNC: 4.9 MG/DL — HIGH (ref 2.5–4.5)
PLATELET # BLD AUTO: 426 K/UL — HIGH (ref 150–400)
PLATELET # BLD AUTO: 426 K/UL — HIGH (ref 150–400)
POTASSIUM SERPL-MCNC: 3.9 MMOL/L — SIGNIFICANT CHANGE UP (ref 3.5–5.3)
POTASSIUM SERPL-MCNC: 3.9 MMOL/L — SIGNIFICANT CHANGE UP (ref 3.5–5.3)
POTASSIUM SERPL-SCNC: 3.9 MMOL/L — SIGNIFICANT CHANGE UP (ref 3.5–5.3)
POTASSIUM SERPL-SCNC: 3.9 MMOL/L — SIGNIFICANT CHANGE UP (ref 3.5–5.3)
PROT SERPL-MCNC: 6.4 G/DL — SIGNIFICANT CHANGE UP (ref 6–8.3)
PROT SERPL-MCNC: 6.4 G/DL — SIGNIFICANT CHANGE UP (ref 6–8.3)
RBC # BLD: 3.38 M/UL — LOW (ref 3.8–5.2)
RBC # BLD: 3.38 M/UL — LOW (ref 3.8–5.2)
RBC # FLD: 13.6 % — SIGNIFICANT CHANGE UP (ref 10.3–14.5)
RBC # FLD: 13.6 % — SIGNIFICANT CHANGE UP (ref 10.3–14.5)
SODIUM SERPL-SCNC: 140 MMOL/L — SIGNIFICANT CHANGE UP (ref 135–145)
SODIUM SERPL-SCNC: 140 MMOL/L — SIGNIFICANT CHANGE UP (ref 135–145)
WBC # BLD: 13.09 K/UL — HIGH (ref 3.8–10.5)
WBC # BLD: 13.09 K/UL — HIGH (ref 3.8–10.5)
WBC # FLD AUTO: 13.09 K/UL — HIGH (ref 3.8–10.5)
WBC # FLD AUTO: 13.09 K/UL — HIGH (ref 3.8–10.5)

## 2023-12-29 PROCEDURE — 99232 SBSQ HOSP IP/OBS MODERATE 35: CPT

## 2023-12-29 RX ORDER — CARVEDILOL PHOSPHATE 80 MG/1
1 CAPSULE, EXTENDED RELEASE ORAL
Qty: 60 | Refills: 0
Start: 2023-12-29 | End: 2024-01-27

## 2023-12-29 RX ORDER — INSULIN DEGLUDEC 100 U/ML
54 INJECTION, SOLUTION SUBCUTANEOUS
Qty: 1 | Refills: 0
Start: 2023-12-29 | End: 2024-01-27

## 2023-12-29 RX ORDER — ERGOCALCIFEROL 1.25 MG/1
1 CAPSULE ORAL
Qty: 4 | Refills: 0
Start: 2023-12-29 | End: 2024-01-27

## 2023-12-29 RX ORDER — ATORVASTATIN CALCIUM 80 MG/1
1 TABLET, FILM COATED ORAL
Qty: 30 | Refills: 0
Start: 2023-12-29 | End: 2024-01-27

## 2023-12-29 RX ORDER — DIPHENHYDRAMINE HCL 50 MG
1 CAPSULE ORAL
Qty: 9 | Refills: 0
Start: 2023-12-29 | End: 2023-12-31

## 2023-12-29 RX ORDER — OXYCODONE HYDROCHLORIDE 5 MG/1
1 TABLET ORAL
Qty: 12 | Refills: 0
Start: 2023-12-29 | End: 2023-12-31

## 2023-12-29 RX ORDER — OXYCODONE HYDROCHLORIDE 5 MG/1
15 TABLET ORAL ONCE
Refills: 0 | Status: DISCONTINUED | OUTPATIENT
Start: 2023-12-29 | End: 2023-12-29

## 2023-12-29 RX ORDER — SEMAGLUTIDE 0.68 MG/ML
0.5 INJECTION, SOLUTION SUBCUTANEOUS
Qty: 1 | Refills: 0
Start: 2023-12-29 | End: 2024-01-27

## 2023-12-29 RX ORDER — LISINOPRIL 2.5 MG/1
1 TABLET ORAL
Qty: 30 | Refills: 0
Start: 2023-12-29 | End: 2024-01-27

## 2023-12-29 RX ORDER — METOCLOPRAMIDE HCL 10 MG
1 TABLET ORAL
Qty: 9 | Refills: 0
Start: 2023-12-29 | End: 2023-12-31

## 2023-12-29 RX ORDER — INSULIN ASPART 100 [IU]/ML
15 INJECTION, SOLUTION SUBCUTANEOUS
Qty: 1 | Refills: 0
Start: 2023-12-29 | End: 2024-01-27

## 2023-12-29 RX ORDER — ACETAZOLAMIDE 250 MG/1
2 TABLET ORAL
Qty: 120 | Refills: 0
Start: 2023-12-29 | End: 2024-01-27

## 2023-12-29 RX ORDER — KETOROLAC TROMETHAMINE 30 MG/ML
1 SYRINGE (ML) INJECTION
Qty: 9 | Refills: 0
Start: 2023-12-29 | End: 2023-12-31

## 2023-12-29 RX ADMIN — Medication 10 MILLIGRAM(S): at 05:50

## 2023-12-29 RX ADMIN — LISINOPRIL 5 MILLIGRAM(S): 2.5 TABLET ORAL at 05:51

## 2023-12-29 RX ADMIN — SPIRONOLACTONE 25 MILLIGRAM(S): 25 TABLET, FILM COATED ORAL at 05:50

## 2023-12-29 RX ADMIN — OXYCODONE HYDROCHLORIDE 15 MILLIGRAM(S): 5 TABLET ORAL at 07:43

## 2023-12-29 RX ADMIN — Medication 667 MILLIGRAM(S): at 09:27

## 2023-12-29 RX ADMIN — OXYCODONE HYDROCHLORIDE 15 MILLIGRAM(S): 5 TABLET ORAL at 12:20

## 2023-12-29 RX ADMIN — Medication 2 TABLET(S): at 12:20

## 2023-12-29 RX ADMIN — ACETAZOLAMIDE 500 MILLIGRAM(S): 250 TABLET ORAL at 05:51

## 2023-12-29 RX ADMIN — BUMETANIDE 2 MILLIGRAM(S): 0.25 INJECTION INTRAMUSCULAR; INTRAVENOUS at 05:51

## 2023-12-29 RX ADMIN — OXYCODONE HYDROCHLORIDE 15 MILLIGRAM(S): 5 TABLET ORAL at 12:50

## 2023-12-29 RX ADMIN — PANTOPRAZOLE SODIUM 40 MILLIGRAM(S): 20 TABLET, DELAYED RELEASE ORAL at 05:54

## 2023-12-29 RX ADMIN — CARVEDILOL PHOSPHATE 3.12 MILLIGRAM(S): 80 CAPSULE, EXTENDED RELEASE ORAL at 05:50

## 2023-12-29 RX ADMIN — Medication 18 UNIT(S): at 09:27

## 2023-12-29 RX ADMIN — Medication 2 TABLET(S): at 12:50

## 2023-12-29 RX ADMIN — NALOXEGOL OXALATE 25 MILLIGRAM(S): 12.5 TABLET, FILM COATED ORAL at 12:21

## 2023-12-29 NOTE — PROGRESS NOTE ADULT - PROBLEM SELECTOR PROBLEM 5
Preventive measure

## 2023-12-29 NOTE — PROGRESS NOTE ADULT - PROVIDER SPECIALTY LIST ADULT
Hospitalist
Hospitalist
Anesthesia
Hospitalist
Hospitalist
Neurology
Hospitalist
Neurology
Hospitalist
Endocrinology
Hospitalist
Endocrinology
Endocrinology
Hospitalist

## 2023-12-29 NOTE — PROGRESS NOTE ADULT - REASON FOR ADMISSION
Headache, tinnitus

## 2023-12-29 NOTE — PROGRESS NOTE ADULT - SUBJECTIVE AND OBJECTIVE BOX
[facility] Division of [facility] Medicine  Anderson Yates MD  Pager [phone]      Patient is a 53y old  Female who presents with a chief complaint of Headache, tinnitus (28 Dec 2023 17:39)      SUBJECTIVE / OVERNIGHT EVENTS:    no acute event o/n. no new complaints     ADDITIONAL REVIEW OF SYSTEMS:    RESPIRATORY: No cough, wheezing, chills or hemoptysis; No shortness of breath  CARDIOVASCULAR: No chest pain, palpitations, dizziness, or leg swelling  GASTROINTESTINAL: No abdominal or epigastric pain. No nausea, vomiting, or hematemesis; No diarrhea or constipation. No melena or hematochezia.      MEDICATIONS  (STANDING):  acetaZOLAMIDE    Tablet 500 milliGRAM(s) Oral two times a day  atorvastatin 20 milliGRAM(s) Oral at bedtime  buMETAnide 2 milliGRAM(s) Oral daily  carvedilol 3.125 milliGRAM(s) Oral every 12 hours  dextrose 50% Injectable 25 Gram(s) IV Push once  ergocalciferol 99285 Unit(s) Oral <User Schedule>  insulin glargine Injectable (LANTUS) 54 Unit(s) SubCutaneous at bedtime  insulin lispro (ADMELOG) corrective regimen sliding scale   SubCutaneous three times a day before meals  insulin lispro (ADMELOG) corrective regimen sliding scale   SubCutaneous at bedtime  insulin lispro Injectable (ADMELOG) 18 Unit(s) SubCutaneous three times a day before meals  ketorolac 10 milliGRAM(s) Oral every 8 hours  lisinopril 5 milliGRAM(s) Oral daily  metoclopramide 10 milliGRAM(s) Oral every 8 hours  naloxegol 25 milliGRAM(s) Oral daily  pantoprazole    Tablet 40 milliGRAM(s) Oral before breakfast  spironolactone 25 milliGRAM(s) Oral daily    MEDICATIONS  (PRN):  acetaminophen 325 mG/butalbital 50 mG/caffeine 40 mG 2 Tablet(s) Oral every 8 hours PRN migraine  aluminum hydroxide/magnesium hydroxide/simethicone Suspension 30 milliLiter(s) Oral every 4 hours PRN Dyspepsia  baclofen 5 milliGRAM(s) Oral every 8 hours PRN Muscle Spasm  diphenhydrAMINE 50 milliGRAM(s) Oral every 8 hours PRN Rash and/or Itching  melatonin 6 milliGRAM(s) Oral at bedtime PRN Sleep  ondansetron Injectable 4 milliGRAM(s) IV Push every 8 hours PRN Nausea and/or Vomiting  oxyCODONE    IR 15 milliGRAM(s) Oral every 6 hours PRN Moderate Pain (4 - 6)      CAPILLARY BLOOD GLUCOSE      POCT Blood Glucose.: 97 mg/dL (29 Dec 2023 09:08)  POCT Blood Glucose.: 108 mg/dL (28 Dec 2023 22:22)  POCT Blood Glucose.: 132 mg/dL (28 Dec 2023 17:59)  POCT Blood Glucose.: 82 mg/dL (28 Dec 2023 12:25)    I&O's Summary    28 Dec 2023 07:01  -  29 Dec 2023 07:00  --------------------------------------------------------  IN: 250 mL / OUT: 0 mL / NET: 250 mL        PHYSICAL EXAM:  Vital Signs Last 24 Hrs  T(C): 36.6 (29 Dec 2023 09:19), Max: 36.8 (28 Dec 2023 17:57)  T(F): 97.9 (29 Dec 2023 09:19), Max: 98.3 (28 Dec 2023 21:03)  HR: 54 (29 Dec 2023 09:19) (54 - 72)  BP: 99/54 (29 Dec 2023 09:19) (99/50 - 119/51)  BP(mean): --  RR: 18 (29 Dec 2023 09:19) (17 - 18)  SpO2: 99% (29 Dec 2023 09:19) (97% - 100%)    Parameters below as of 29 Dec 2023 05:48  Patient On (Oxygen Delivery Method): room air        CONSTITUTIONAL: NAD,  EYES: PERRLA; conjunctiva and sclera clear  ENMT: Moist oral mucosa, no pharyngeal injection or exudates;   NECK: Supple, no palpable masses;  RESPIRATORY: Normal respiratory effort; lungs are clear to auscultation bilaterally  CARDIOVASCULAR: Regular rate and rhythm, normal S1 and S2, no murmur/rub/gallop; No lower extremity edema; Peripheral pulses are 2+ bilaterally  ABDOMEN: Nontender to palpation, normoactive bowel sounds, no rebound/guarding;   MUSCLOSKELETAL:   no clubbing or cyanosis of digits; no joint swelling or tenderness to palpation  PSYCH: A+O to person, place, and time; affect appropriate  NEUROLOGY: CN 2-12 are intact and symmetric; no gross sensory deficits;   SKIN: No rashes;     LABS:                        10.0   13.09 )-----------( 426      ( 29 Dec 2023 06:50 )             31.2     12-29    140  |  109<H>  |  28<H>  ----------------------------<  87  3.9   |  21<L>  |  1.16    Ca    8.0<L>      29 Dec 2023 06:50  Phos  4.9     12-29  Mg     1.80     12-29    TPro  6.4  /  Alb  3.1<L>  /  TBili  0.2  /  DBili  x   /  AST  16  /  ALT  19  /  AlkPhos  105  12-29          Urinalysis Basic - ( 29 Dec 2023 06:50 )    Color: x / Appearance: x / SG: x / pH: x  Gluc: 87 mg/dL / Ketone: x  / Bili: x / Urobili: x   Blood: x / Protein: x / Nitrite: x   Leuk Esterase: x / RBC: x / WBC x   Sq Epi: x / Non Sq Epi: x / Bacteria: x          RADIOLOGY & ADDITIONAL TESTS:  Results Reviewed:   Imaging Personally Reviewed:  Electrocardiogram Personally Reviewed:    COORDINATION OF CARE:  Care Discussed with Consultants/Other Providers [Y/N]:  Prior or Outpatient Records Reviewed [Y/N]:   [facility] Division of [facility] Medicine  Anderson Yates MD  Pager [phone]      Patient is a 53y old  Female who presents with a chief complaint of Headache, tinnitus (28 Dec 2023 17:39)      SUBJECTIVE / OVERNIGHT EVENTS:    no acute event o/n. no new complaints     ADDITIONAL REVIEW OF SYSTEMS:    RESPIRATORY: No cough, wheezing, chills or hemoptysis; No shortness of breath  CARDIOVASCULAR: No chest pain, palpitations, dizziness, or leg swelling  GASTROINTESTINAL: No abdominal or epigastric pain. No nausea, vomiting, or hematemesis; No diarrhea or constipation. No melena or hematochezia.      MEDICATIONS  (STANDING):  acetaZOLAMIDE    Tablet 500 milliGRAM(s) Oral two times a day  atorvastatin 20 milliGRAM(s) Oral at bedtime  buMETAnide 2 milliGRAM(s) Oral daily  carvedilol 3.125 milliGRAM(s) Oral every 12 hours  dextrose 50% Injectable 25 Gram(s) IV Push once  ergocalciferol 24912 Unit(s) Oral <User Schedule>  insulin glargine Injectable (LANTUS) 54 Unit(s) SubCutaneous at bedtime  insulin lispro (ADMELOG) corrective regimen sliding scale   SubCutaneous three times a day before meals  insulin lispro (ADMELOG) corrective regimen sliding scale   SubCutaneous at bedtime  insulin lispro Injectable (ADMELOG) 18 Unit(s) SubCutaneous three times a day before meals  ketorolac 10 milliGRAM(s) Oral every 8 hours  lisinopril 5 milliGRAM(s) Oral daily  metoclopramide 10 milliGRAM(s) Oral every 8 hours  naloxegol 25 milliGRAM(s) Oral daily  pantoprazole    Tablet 40 milliGRAM(s) Oral before breakfast  spironolactone 25 milliGRAM(s) Oral daily    MEDICATIONS  (PRN):  acetaminophen 325 mG/butalbital 50 mG/caffeine 40 mG 2 Tablet(s) Oral every 8 hours PRN migraine  aluminum hydroxide/magnesium hydroxide/simethicone Suspension 30 milliLiter(s) Oral every 4 hours PRN Dyspepsia  baclofen 5 milliGRAM(s) Oral every 8 hours PRN Muscle Spasm  diphenhydrAMINE 50 milliGRAM(s) Oral every 8 hours PRN Rash and/or Itching  melatonin 6 milliGRAM(s) Oral at bedtime PRN Sleep  ondansetron Injectable 4 milliGRAM(s) IV Push every 8 hours PRN Nausea and/or Vomiting  oxyCODONE    IR 15 milliGRAM(s) Oral every 6 hours PRN Moderate Pain (4 - 6)      CAPILLARY BLOOD GLUCOSE      POCT Blood Glucose.: 97 mg/dL (29 Dec 2023 09:08)  POCT Blood Glucose.: 108 mg/dL (28 Dec 2023 22:22)  POCT Blood Glucose.: 132 mg/dL (28 Dec 2023 17:59)  POCT Blood Glucose.: 82 mg/dL (28 Dec 2023 12:25)    I&O's Summary    28 Dec 2023 07:01  -  29 Dec 2023 07:00  --------------------------------------------------------  IN: 250 mL / OUT: 0 mL / NET: 250 mL        PHYSICAL EXAM:  Vital Signs Last 24 Hrs  T(C): 36.6 (29 Dec 2023 09:19), Max: 36.8 (28 Dec 2023 17:57)  T(F): 97.9 (29 Dec 2023 09:19), Max: 98.3 (28 Dec 2023 21:03)  HR: 54 (29 Dec 2023 09:19) (54 - 72)  BP: 99/54 (29 Dec 2023 09:19) (99/50 - 119/51)  BP(mean): --  RR: 18 (29 Dec 2023 09:19) (17 - 18)  SpO2: 99% (29 Dec 2023 09:19) (97% - 100%)    Parameters below as of 29 Dec 2023 05:48  Patient On (Oxygen Delivery Method): room air        CONSTITUTIONAL: NAD,  EYES: PERRLA; conjunctiva and sclera clear  ENMT: Moist oral mucosa, no pharyngeal injection or exudates;   NECK: Supple, no palpable masses;  RESPIRATORY: Normal respiratory effort; lungs are clear to auscultation bilaterally  CARDIOVASCULAR: Regular rate and rhythm, normal S1 and S2, no murmur/rub/gallop; No lower extremity edema; Peripheral pulses are 2+ bilaterally  ABDOMEN: Nontender to palpation, normoactive bowel sounds, no rebound/guarding;   MUSCLOSKELETAL:   no clubbing or cyanosis of digits; no joint swelling or tenderness to palpation  PSYCH: A+O to person, place, and time; affect appropriate  NEUROLOGY: CN 2-12 are intact and symmetric; no gross sensory deficits;   SKIN: No rashes;     LABS:                        10.0   13.09 )-----------( 426      ( 29 Dec 2023 06:50 )             31.2     12-29    140  |  109<H>  |  28<H>  ----------------------------<  87  3.9   |  21<L>  |  1.16    Ca    8.0<L>      29 Dec 2023 06:50  Phos  4.9     12-29  Mg     1.80     12-29    TPro  6.4  /  Alb  3.1<L>  /  TBili  0.2  /  DBili  x   /  AST  16  /  ALT  19  /  AlkPhos  105  12-29          Urinalysis Basic - ( 29 Dec 2023 06:50 )    Color: x / Appearance: x / SG: x / pH: x  Gluc: 87 mg/dL / Ketone: x  / Bili: x / Urobili: x   Blood: x / Protein: x / Nitrite: x   Leuk Esterase: x / RBC: x / WBC x   Sq Epi: x / Non Sq Epi: x / Bacteria: x          RADIOLOGY & ADDITIONAL TESTS:  Results Reviewed:   Imaging Personally Reviewed:  Electrocardiogram Personally Reviewed:    COORDINATION OF CARE:  Care Discussed with Consultants/Other Providers [Y/N]:  Prior or Outpatient Records Reviewed [Y/N]:

## 2023-12-29 NOTE — CHART NOTE - NSCHARTNOTEFT_GEN_A_CORE
Had tordol couple days inpatient ~6-7 days, Cr stable. Discharge medication reviewed with attending, will send on tordol, oxy, reglan, benadryl X3 days for the migraine. Attending discussed with patient who will followup with her PMD for pain management outpatient.

## 2023-12-29 NOTE — PROGRESS NOTE ADULT - PROBLEM SELECTOR PROBLEM 3
Hyperlipidemia
Type 2 diabetes mellitus
Hyperlipidemia
Hyperlipidemia
Type 2 diabetes mellitus

## 2023-12-29 NOTE — PROGRESS NOTE ADULT - PROBLEM SELECTOR PROBLEM 2
Chronic CHF
Essential hypertension
Chronic CHF
Chronic CHF
Essential hypertension
Essential hypertension
Chronic CHF

## 2023-12-29 NOTE — PROGRESS NOTE ADULT - ASSESSMENT
54 yo F with Pmhx of CHF, HTN, HLD, DM, Idiopathic intracranial hypertension (diagnosed 2020), chronic back pain (intercostal neuralgia/complex regional pain syndrome) s/p spinal stimulator presents to the ED with worsening headache thought to be  due to Pseudotumor cerebri. s/p  LP by IR found to have normal opening pressure.  now with worse headache after LP. Headache likely due to complex migraines

## 2023-12-29 NOTE — PROGRESS NOTE ADULT - NUTRITIONAL ASSESSMENT
This patient has been assessed with a concern for Malnutrition and has been determined to have a diagnosis/diagnoses of Morbid obesity (BMI > 40).    This patient is being managed with:   Diet DASH/TLC-  Sodium & Cholesterol Restricted  Consistent Carbohydrate {No Snacks} (CSTCHO)  Entered: Dec 14 2023 11:05AM  

## 2023-12-29 NOTE — PROGRESS NOTE ADULT - PROBLEM SELECTOR PROBLEM 1
Pseudotumor cerebri
Type 2 diabetes mellitus
Pseudotumor cerebri
Type 2 diabetes mellitus
Pseudotumor cerebri
Type 2 diabetes mellitus
Pseudotumor cerebri

## 2024-01-08 NOTE — CHART NOTE - NSCHARTNOTESELECT_GEN_ALL_CORE
Endocrinology/Event Note
Endocrinology/Event Note
Event Note
Post-Discharge Note
discharge/Event Note
istop/Event Note
NSG/Event Note
Anesthesia follow up note/Event Note
Neurology
Ophthlamology
Pre-IR Note/Event Note

## 2024-01-17 ENCOUNTER — APPOINTMENT (OUTPATIENT)
Dept: PULMONOLOGY | Facility: CLINIC | Age: 54
End: 2024-01-17

## 2024-01-27 LAB
CULTURE RESULTS: SIGNIFICANT CHANGE UP
SPECIMEN SOURCE: SIGNIFICANT CHANGE UP

## 2024-02-01 NOTE — PROGRESS NOTE ADULT - SUBJECTIVE AND OBJECTIVE BOX
Discharge orders noted.  Report called to Sowmya Lane at [facility] [phone].  Per Sowmya transport will be requested, but doesn't have an exact time on pickup.  Will continue to monitor.    [facility] Medicine  Anderson Yates MD  Pager [phone]      Patient is a 53y old  Female who presents with a chief complaint of Headache, tinnitus (17 Dec 2023 08:13)      SUBJECTIVE / OVERNIGHT EVENTS:    pt cont to report severe headache not improving since last LP. Denies photophobia     ADDITIONAL REVIEW OF SYSTEMS:    RESPIRATORY: No cough, wheezing, chills or hemoptysis; No shortness of breath  CARDIOVASCULAR: No chest pain, palpitations, dizziness, or leg swelling  GASTROINTESTINAL: No abdominal or epigastric pain. No nausea, vomiting, or hematemesis; No diarrhea or constipation. No melena or hematochezia.      MEDICATIONS  (STANDING):  acetaZOLAMIDE    Tablet 500 milliGRAM(s) Oral two times a day  buMETAnide 2 milliGRAM(s) Oral daily  carvedilol 3.125 milliGRAM(s) Oral every 12 hours  dextrose 5%. 1000 milliLiter(s) (100 mL/Hr) IV Continuous <Continuous>  dextrose 5%. 1000 milliLiter(s) (50 mL/Hr) IV Continuous <Continuous>  dextrose 50% Injectable 25 Gram(s) IV Push once  dextrose 50% Injectable 25 Gram(s) IV Push once  dextrose 50% Injectable 12.5 Gram(s) IV Push once  glucagon  Injectable 1 milliGRAM(s) IntraMuscular once  insulin glargine Injectable (LANTUS) 53 Unit(s) SubCutaneous at bedtime  insulin lispro (ADMELOG) corrective regimen sliding scale   SubCutaneous three times a day before meals  insulin lispro (ADMELOG) corrective regimen sliding scale   SubCutaneous at bedtime  insulin lispro Injectable (ADMELOG) 15 Unit(s) SubCutaneous three times a day before meals  lisinopril 10 milliGRAM(s) Oral daily  naloxegol 25 milliGRAM(s) Oral daily  spironolactone 25 milliGRAM(s) Oral daily    MEDICATIONS  (PRN):  acetaminophen     Tablet .. 650 milliGRAM(s) Oral every 6 hours PRN Temp greater or equal to 38C (100.4F), Mild Pain (1 - 3)  acetaminophen   IVPB .. 1000 milliGRAM(s) IV Intermittent once PRN Mild Pain (1 - 3)  aluminum hydroxide/magnesium hydroxide/simethicone Suspension 30 milliLiter(s) Oral every 4 hours PRN Dyspepsia  baclofen 5 milliGRAM(s) Oral every 8 hours PRN Muscle Spasm  dextrose Oral Gel 15 Gram(s) Oral once PRN Blood Glucose LESS THAN 70 milliGRAM(s)/deciliter  HYDROmorphone  Injectable 0.5 milliGRAM(s) IV Push every 8 hours PRN Severe Pain (7 - 10)  melatonin 3 milliGRAM(s) Oral at bedtime PRN Insomnia  ondansetron Injectable 4 milliGRAM(s) IV Push every 8 hours PRN Nausea and/or Vomiting  oxyCODONE    IR 10 milliGRAM(s) Oral every 6 hours PRN Moderate Pain (4 - 6)      CAPILLARY BLOOD GLUCOSE      POCT Blood Glucose.: 229 mg/dL (18 Dec 2023 12:32)  POCT Blood Glucose.: 265 mg/dL (18 Dec 2023 10:23)  POCT Blood Glucose.: 193 mg/dL (18 Dec 2023 09:05)  POCT Blood Glucose.: 166 mg/dL (17 Dec 2023 22:03)  POCT Blood Glucose.: 210 mg/dL (17 Dec 2023 17:36)    I&O's Summary    17 Dec 2023 07:01  -  18 Dec 2023 07:00  --------------------------------------------------------  IN: 1880 mL / OUT: 1000 mL / NET: 880 mL        PHYSICAL EXAM:  Vital Signs Last 24 Hrs  T(C): 36.7 (18 Dec 2023 07:51), Max: 36.8 (18 Dec 2023 00:21)  T(F): 98 (18 Dec 2023 07:51), Max: 98.3 (18 Dec 2023 00:21)  HR: 66 (18 Dec 2023 07:51) (65 - 78)  BP: 109/60 (18 Dec 2023 07:51) (109/60 - 122/68)  BP(mean): --  RR: 18 (18 Dec 2023 07:51) (18 - 18)  SpO2: 100% (18 Dec 2023 07:51) (98% - 100%)    Parameters below as of 18 Dec 2023 07:51  Patient On (Oxygen Delivery Method): room air        CONSTITUTIONAL: NAD,  EYES: PERRLA; conjunctiva and sclera clear  ENMT: Moist oral mucosa, no pharyngeal injection or exudates;   NECK: Supple, no palpable masses;  RESPIRATORY: Normal respiratory effort; lungs are clear to auscultation bilaterally  CARDIOVASCULAR: Regular rate and rhythm, normal S1 and S2, no murmur/rub/gallop; No lower extremity edema; Peripheral pulses are 2+ bilaterally  ABDOMEN: Nontender to palpation, normoactive bowel sounds, no rebound/guarding;   MUSCLOSKELETAL:   no clubbing or cyanosis of digits; no joint swelling or tenderness to palpation  PSYCH: A+O to person, place, and time; affect appropriate  NEUROLOGY: CN 2-12 are intact and symmetric; no gross sensory deficits;   SKIN: No rashes;     LABS:                        12.5   11.47 )-----------( 465      ( 18 Dec 2023 07:30 )             38.1     12-18    136  |  103  |  22  ----------------------------<  180<H>  3.7   |  18<L>  |  0.83    Ca    9.1      18 Dec 2023 07:30  Phos  3.9     12-18  Mg     1.90     12-18            Urinalysis Basic - ( 18 Dec 2023 07:30 )    Color: x / Appearance: x / SG: x / pH: x  Gluc: 180 mg/dL / Ketone: x  / Bili: x / Urobili: x   Blood: x / Protein: x / Nitrite: x   Leuk Esterase: x / RBC: x / WBC x   Sq Epi: x / Non Sq Epi: x / Bacteria: x          RADIOLOGY & ADDITIONAL TESTS:  Results Reviewed:   Imaging Personally Reviewed:  Electrocardiogram Personally Reviewed:    COORDINATION OF CARE:  Care Discussed with Consultants/Other Providers [Y/N]:  Prior or Outpatient Records Reviewed [Y/N]:   [facility]  Anderson Yates MD  Pager [phone]      Patient is a 53y old  Female who presents with a chief complaint of Headache, tinnitus (17 Dec 2023 08:13)      SUBJECTIVE / OVERNIGHT EVENTS:    pt cont to report severe headache not improving since last LP. Denies photophobia     ADDITIONAL REVIEW OF SYSTEMS:    RESPIRATORY: No cough, wheezing, chills or hemoptysis; No shortness of breath  CARDIOVASCULAR: No chest pain, palpitations, dizziness, or leg swelling  GASTROINTESTINAL: No abdominal or epigastric pain. No nausea, vomiting, or hematemesis; No diarrhea or constipation. No melena or hematochezia.      MEDICATIONS  (STANDING):  acetaZOLAMIDE    Tablet 500 milliGRAM(s) Oral two times a day  buMETAnide 2 milliGRAM(s) Oral daily  carvedilol 3.125 milliGRAM(s) Oral every 12 hours  dextrose 5%. 1000 milliLiter(s) (100 mL/Hr) IV Continuous <Continuous>  dextrose 5%. 1000 milliLiter(s) (50 mL/Hr) IV Continuous <Continuous>  dextrose 50% Injectable 25 Gram(s) IV Push once  dextrose 50% Injectable 25 Gram(s) IV Push once  dextrose 50% Injectable 12.5 Gram(s) IV Push once  glucagon  Injectable 1 milliGRAM(s) IntraMuscular once  insulin glargine Injectable (LANTUS) 53 Unit(s) SubCutaneous at bedtime  insulin lispro (ADMELOG) corrective regimen sliding scale   SubCutaneous three times a day before meals  insulin lispro (ADMELOG) corrective regimen sliding scale   SubCutaneous at bedtime  insulin lispro Injectable (ADMELOG) 15 Unit(s) SubCutaneous three times a day before meals  lisinopril 10 milliGRAM(s) Oral daily  naloxegol 25 milliGRAM(s) Oral daily  spironolactone 25 milliGRAM(s) Oral daily    MEDICATIONS  (PRN):  acetaminophen     Tablet .. 650 milliGRAM(s) Oral every 6 hours PRN Temp greater or equal to 38C (100.4F), Mild Pain (1 - 3)  acetaminophen   IVPB .. 1000 milliGRAM(s) IV Intermittent once PRN Mild Pain (1 - 3)  aluminum hydroxide/magnesium hydroxide/simethicone Suspension 30 milliLiter(s) Oral every 4 hours PRN Dyspepsia  baclofen 5 milliGRAM(s) Oral every 8 hours PRN Muscle Spasm  dextrose Oral Gel 15 Gram(s) Oral once PRN Blood Glucose LESS THAN 70 milliGRAM(s)/deciliter  HYDROmorphone  Injectable 0.5 milliGRAM(s) IV Push every 8 hours PRN Severe Pain (7 - 10)  melatonin 3 milliGRAM(s) Oral at bedtime PRN Insomnia  ondansetron Injectable 4 milliGRAM(s) IV Push every 8 hours PRN Nausea and/or Vomiting  oxyCODONE    IR 10 milliGRAM(s) Oral every 6 hours PRN Moderate Pain (4 - 6)      CAPILLARY BLOOD GLUCOSE      POCT Blood Glucose.: 229 mg/dL (18 Dec 2023 12:32)  POCT Blood Glucose.: 265 mg/dL (18 Dec 2023 10:23)  POCT Blood Glucose.: 193 mg/dL (18 Dec 2023 09:05)  POCT Blood Glucose.: 166 mg/dL (17 Dec 2023 22:03)  POCT Blood Glucose.: 210 mg/dL (17 Dec 2023 17:36)    I&O's Summary    17 Dec 2023 07:01  -  18 Dec 2023 07:00  --------------------------------------------------------  IN: 1880 mL / OUT: 1000 mL / NET: 880 mL        PHYSICAL EXAM:  Vital Signs Last 24 Hrs  T(C): 36.7 (18 Dec 2023 07:51), Max: 36.8 (18 Dec 2023 00:21)  T(F): 98 (18 Dec 2023 07:51), Max: 98.3 (18 Dec 2023 00:21)  HR: 66 (18 Dec 2023 07:51) (65 - 78)  BP: 109/60 (18 Dec 2023 07:51) (109/60 - 122/68)  BP(mean): --  RR: 18 (18 Dec 2023 07:51) (18 - 18)  SpO2: 100% (18 Dec 2023 07:51) (98% - 100%)    Parameters below as of 18 Dec 2023 07:51  Patient On (Oxygen Delivery Method): room air        CONSTITUTIONAL: NAD,  EYES: PERRLA; conjunctiva and sclera clear  ENMT: Moist oral mucosa, no pharyngeal injection or exudates;   NECK: Supple, no palpable masses;  RESPIRATORY: Normal respiratory effort; lungs are clear to auscultation bilaterally  CARDIOVASCULAR: Regular rate and rhythm, normal S1 and S2, no murmur/rub/gallop; No lower extremity edema; Peripheral pulses are 2+ bilaterally  ABDOMEN: Nontender to palpation, normoactive bowel sounds, no rebound/guarding;   MUSCLOSKELETAL:   no clubbing or cyanosis of digits; no joint swelling or tenderness to palpation  PSYCH: A+O to person, place, and time; affect appropriate  NEUROLOGY: CN 2-12 are intact and symmetric; no gross sensory deficits;   SKIN: No rashes;     LABS:                        12.5   11.47 )-----------( 465      ( 18 Dec 2023 07:30 )             38.1     12-18    136  |  103  |  22  ----------------------------<  180<H>  3.7   |  18<L>  |  0.83    Ca    9.1      18 Dec 2023 07:30  Phos  3.9     12-18  Mg     1.90     12-18            Urinalysis Basic - ( 18 Dec 2023 07:30 )    Color: x / Appearance: x / SG: x / pH: x  Gluc: 180 mg/dL / Ketone: x  / Bili: x / Urobili: x   Blood: x / Protein: x / Nitrite: x   Leuk Esterase: x / RBC: x / WBC x   Sq Epi: x / Non Sq Epi: x / Bacteria: x          RADIOLOGY & ADDITIONAL TESTS:  Results Reviewed:   Imaging Personally Reviewed:  Electrocardiogram Personally Reviewed:    COORDINATION OF CARE:  Care Discussed with Consultants/Other Providers [Y/N]:  Prior or Outpatient Records Reviewed [Y/N]:

## 2024-02-02 ENCOUNTER — APPOINTMENT (OUTPATIENT)
Dept: HOME HEALTH SERVICES | Facility: HOME HEALTH | Age: 54
End: 2024-02-02

## 2024-02-17 ENCOUNTER — INPATIENT (INPATIENT)
Facility: HOSPITAL | Age: 54
LOS: 3 days | Discharge: HOME | DRG: 74 | End: 2024-02-21
Attending: STUDENT IN AN ORGANIZED HEALTH CARE EDUCATION/TRAINING PROGRAM | Admitting: STUDENT IN AN ORGANIZED HEALTH CARE EDUCATION/TRAINING PROGRAM
Payer: MEDICARE

## 2024-02-17 VITALS
OXYGEN SATURATION: 97 % | SYSTOLIC BLOOD PRESSURE: 149 MMHG | WEIGHT: 293 LBS | DIASTOLIC BLOOD PRESSURE: 70 MMHG | HEART RATE: 81 BPM | TEMPERATURE: 98 F | HEIGHT: 68 IN | RESPIRATION RATE: 18 BRPM

## 2024-02-17 DIAGNOSIS — Z98.890 OTHER SPECIFIED POSTPROCEDURAL STATES: Chronic | ICD-10-CM

## 2024-02-17 DIAGNOSIS — Z90.49 ACQUIRED ABSENCE OF OTHER SPECIFIED PARTS OF DIGESTIVE TRACT: Chronic | ICD-10-CM

## 2024-02-17 LAB
ALBUMIN SERPL ELPH-MCNC: 3.2 G/DL — LOW (ref 3.5–5)
ALP SERPL-CCNC: 115 U/L — SIGNIFICANT CHANGE UP (ref 40–120)
ALT FLD-CCNC: 23 U/L DA — SIGNIFICANT CHANGE UP (ref 10–60)
ANION GAP SERPL CALC-SCNC: 6 MMOL/L — SIGNIFICANT CHANGE UP (ref 5–17)
AST SERPL-CCNC: 13 U/L — SIGNIFICANT CHANGE UP (ref 10–40)
BILIRUB SERPL-MCNC: 0.5 MG/DL — SIGNIFICANT CHANGE UP (ref 0.2–1.2)
BUN SERPL-MCNC: 13 MG/DL — SIGNIFICANT CHANGE UP (ref 7–18)
CALCIUM SERPL-MCNC: 8.6 MG/DL — SIGNIFICANT CHANGE UP (ref 8.4–10.5)
CHLORIDE SERPL-SCNC: 103 MMOL/L — SIGNIFICANT CHANGE UP (ref 96–108)
CO2 SERPL-SCNC: 27 MMOL/L — SIGNIFICANT CHANGE UP (ref 22–31)
CREAT SERPL-MCNC: 0.91 MG/DL — SIGNIFICANT CHANGE UP (ref 0.5–1.3)
EGFR: 75 ML/MIN/1.73M2 — SIGNIFICANT CHANGE UP
GLUCOSE SERPL-MCNC: 223 MG/DL — HIGH (ref 70–99)
HCG SERPL-ACNC: <1 MIU/ML — SIGNIFICANT CHANGE UP
HCT VFR BLD CALC: 36 % — SIGNIFICANT CHANGE UP (ref 34.5–45)
HGB BLD-MCNC: 12.1 G/DL — SIGNIFICANT CHANGE UP (ref 11.5–15.5)
MCHC RBC-ENTMCNC: 29.6 PG — SIGNIFICANT CHANGE UP (ref 27–34)
MCHC RBC-ENTMCNC: 33.6 GM/DL — SIGNIFICANT CHANGE UP (ref 32–36)
MCV RBC AUTO: 88 FL — SIGNIFICANT CHANGE UP (ref 80–100)
NRBC # BLD: 0 /100 WBCS — SIGNIFICANT CHANGE UP (ref 0–0)
PLATELET # BLD AUTO: 432 K/UL — HIGH (ref 150–400)
POTASSIUM SERPL-MCNC: 3.6 MMOL/L — SIGNIFICANT CHANGE UP (ref 3.5–5.3)
POTASSIUM SERPL-SCNC: 3.6 MMOL/L — SIGNIFICANT CHANGE UP (ref 3.5–5.3)
PROT SERPL-MCNC: 7.7 G/DL — SIGNIFICANT CHANGE UP (ref 6–8.3)
RBC # BLD: 4.09 M/UL — SIGNIFICANT CHANGE UP (ref 3.8–5.2)
RBC # FLD: 12.4 % — SIGNIFICANT CHANGE UP (ref 10.3–14.5)
SODIUM SERPL-SCNC: 136 MMOL/L — SIGNIFICANT CHANGE UP (ref 135–145)
WBC # BLD: 12.24 K/UL — HIGH (ref 3.8–10.5)
WBC # FLD AUTO: 12.24 K/UL — HIGH (ref 3.8–10.5)

## 2024-02-17 PROCEDURE — 99285 EMERGENCY DEPT VISIT HI MDM: CPT

## 2024-02-17 RX ORDER — KETOROLAC TROMETHAMINE 30 MG/ML
15 SYRINGE (ML) INJECTION ONCE
Refills: 0 | Status: DISCONTINUED | OUTPATIENT
Start: 2024-02-17 | End: 2024-02-17

## 2024-02-17 RX ORDER — LIDOCAINE 4 G/100G
1 CREAM TOPICAL ONCE
Refills: 0 | Status: COMPLETED | OUTPATIENT
Start: 2024-02-17 | End: 2024-02-17

## 2024-02-17 RX ORDER — MORPHINE SULFATE 50 MG/1
4 CAPSULE, EXTENDED RELEASE ORAL ONCE
Refills: 0 | Status: DISCONTINUED | OUTPATIENT
Start: 2024-02-17 | End: 2024-02-17

## 2024-02-17 RX ORDER — ONDANSETRON 8 MG/1
8 TABLET, FILM COATED ORAL ONCE
Refills: 0 | Status: COMPLETED | OUTPATIENT
Start: 2024-02-17 | End: 2024-02-17

## 2024-02-17 RX ORDER — HYDROMORPHONE HYDROCHLORIDE 2 MG/ML
1 INJECTION INTRAMUSCULAR; INTRAVENOUS; SUBCUTANEOUS ONCE
Refills: 0 | Status: DISCONTINUED | OUTPATIENT
Start: 2024-02-17 | End: 2024-02-17

## 2024-02-17 RX ADMIN — Medication 15 MILLIGRAM(S): at 23:00

## 2024-02-17 RX ADMIN — ONDANSETRON 8 MILLIGRAM(S): 8 TABLET, FILM COATED ORAL at 21:32

## 2024-02-17 RX ADMIN — Medication 15 MILLIGRAM(S): at 23:50

## 2024-02-17 RX ADMIN — MORPHINE SULFATE 4 MILLIGRAM(S): 50 CAPSULE, EXTENDED RELEASE ORAL at 21:32

## 2024-02-17 RX ADMIN — MORPHINE SULFATE 4 MILLIGRAM(S): 50 CAPSULE, EXTENDED RELEASE ORAL at 22:35

## 2024-02-17 RX ADMIN — Medication 15 MILLIGRAM(S): at 22:29

## 2024-02-17 RX ADMIN — Medication 15 MILLIGRAM(S): at 23:13

## 2024-02-17 RX ADMIN — LIDOCAINE 1 PATCH: 4 CREAM TOPICAL at 21:33

## 2024-02-17 NOTE — ED PROVIDER NOTE - PHYSICAL EXAMINATION
GENERAL: well appearing, no acute distress   HEAD: atraumatic   EYES: EOMI   ENT: moist oral mucosa   CARDIAC: regular rate  RESPIRATORY: no increased work of breathing  MUSCULOSKELETAL: no deformity   NEUROLOGICAL: alert, spontaneous movement of extremities   SKIN: no visible rash  PSYCHIATRIC: cooperative

## 2024-02-17 NOTE — ED ADULT NURSE NOTE - OBJECTIVE STATEMENT
Received pt with c/o right shoulder pain radiating to right flank. Denies CP, SOB, urinary symptoms, numbness/tingling. Ambulates with cane. A&Ox4. Skin warm, dry, intact. IV heplock inserted. Labs collected. Meds given as ordered.

## 2024-02-17 NOTE — ED PROVIDER NOTE - OBJECTIVE STATEMENT
53-year-old female past medical history of CHF, hypertension, hyperlipidemia, diabetes, idiopathic intracranial hypertension, chronic right-sided back and rib pain with a spinal stimulator, complex regional pain syndrome (on oxycodone as outpatient) presents for evaluation of nausea x 1 day.  Patient is concerned because she was unable to take her cardiac meds today due to vomiting.  Feels that her nausea is likely related to a developing migraine and she has Reglan at home but states that this does not work and she would like me to prescribe her Zofran.  Denies other acute complaints

## 2024-02-17 NOTE — ED ADULT NURSE NOTE - NSFALLCONCLUSION_ED_ALL_ED
Pt a/o x4. VSS. All prescriptions, discharge and follow up instructions given to the patient. The patient verbalizes understanding and denies questions. IV removed. Wound care gave pt supplies. Surgery remove staples and stay sutures. Family at bedside. All belongings collected and sent with the patient. The patient was discharged off the unit by wheelchair in stable condition. Universal Safety Interventions

## 2024-02-17 NOTE — ED ADULT TRIAGE NOTE - CHIEF COMPLAINT QUOTE
Pt BIBA c/o nausea, vomiting right side mid shoulder pain going down her right side of her body. Pt has history of intercostal myalgia complex region

## 2024-02-17 NOTE — ED PROVIDER NOTE - PROGRESS NOTE DETAILS
Labs with hemoglobin of 12, hyperglycemia without evidence of DKA  Patient still with significant pain despite multiple rounds of parenteral pain control.  Also still with nausea and feels that this has not improved despite multiple rounds of antiemetics.  PCP not affiliated with [facility].  Patient states she must be admitted in order to help control her symptoms further.  Endorsed to hospitalist Dr. Ramsay and MAR

## 2024-02-18 DIAGNOSIS — Z29.9 ENCOUNTER FOR PROPHYLACTIC MEASURES, UNSPECIFIED: ICD-10-CM

## 2024-02-18 DIAGNOSIS — G93.2 BENIGN INTRACRANIAL HYPERTENSION: ICD-10-CM

## 2024-02-18 DIAGNOSIS — R11.2 NAUSEA WITH VOMITING, UNSPECIFIED: ICD-10-CM

## 2024-02-18 DIAGNOSIS — I50.9 HEART FAILURE, UNSPECIFIED: ICD-10-CM

## 2024-02-18 DIAGNOSIS — R11.10 VOMITING, UNSPECIFIED: ICD-10-CM

## 2024-02-18 DIAGNOSIS — E78.5 HYPERLIPIDEMIA, UNSPECIFIED: ICD-10-CM

## 2024-02-18 DIAGNOSIS — G90.50 COMPLEX REGIONAL PAIN SYNDROME I, UNSPECIFIED: ICD-10-CM

## 2024-02-18 DIAGNOSIS — E11.9 TYPE 2 DIABETES MELLITUS WITHOUT COMPLICATIONS: ICD-10-CM

## 2024-02-18 DIAGNOSIS — I10 ESSENTIAL (PRIMARY) HYPERTENSION: ICD-10-CM

## 2024-02-18 LAB
ANION GAP SERPL CALC-SCNC: 6 MMOL/L — SIGNIFICANT CHANGE UP (ref 5–17)
BUN SERPL-MCNC: 16 MG/DL — SIGNIFICANT CHANGE UP (ref 7–18)
CALCIUM SERPL-MCNC: 8.2 MG/DL — LOW (ref 8.4–10.5)
CHLORIDE SERPL-SCNC: 102 MMOL/L — SIGNIFICANT CHANGE UP (ref 96–108)
CO2 SERPL-SCNC: 27 MMOL/L — SIGNIFICANT CHANGE UP (ref 22–31)
CREAT SERPL-MCNC: 0.95 MG/DL — SIGNIFICANT CHANGE UP (ref 0.5–1.3)
EGFR: 72 ML/MIN/1.73M2 — SIGNIFICANT CHANGE UP
GLUCOSE BLDC GLUCOMTR-MCNC: 117 MG/DL — HIGH (ref 70–99)
GLUCOSE BLDC GLUCOMTR-MCNC: 119 MG/DL — HIGH (ref 70–99)
GLUCOSE BLDC GLUCOMTR-MCNC: 127 MG/DL — HIGH (ref 70–99)
GLUCOSE BLDC GLUCOMTR-MCNC: 239 MG/DL — HIGH (ref 70–99)
GLUCOSE SERPL-MCNC: 285 MG/DL — HIGH (ref 70–99)
HCT VFR BLD CALC: 34.9 % — SIGNIFICANT CHANGE UP (ref 34.5–45)
HGB BLD-MCNC: 11.7 G/DL — SIGNIFICANT CHANGE UP (ref 11.5–15.5)
MAGNESIUM SERPL-MCNC: 1.3 MG/DL — LOW (ref 1.6–2.6)
MCHC RBC-ENTMCNC: 30.3 PG — SIGNIFICANT CHANGE UP (ref 27–34)
MCHC RBC-ENTMCNC: 33.5 GM/DL — SIGNIFICANT CHANGE UP (ref 32–36)
MCV RBC AUTO: 90.4 FL — SIGNIFICANT CHANGE UP (ref 80–100)
NRBC # BLD: 0 /100 WBCS — SIGNIFICANT CHANGE UP (ref 0–0)
PHOSPHATE SERPL-MCNC: 4.1 MG/DL — SIGNIFICANT CHANGE UP (ref 2.5–4.5)
PLATELET # BLD AUTO: 398 K/UL — SIGNIFICANT CHANGE UP (ref 150–400)
POTASSIUM SERPL-MCNC: 3.4 MMOL/L — LOW (ref 3.5–5.3)
POTASSIUM SERPL-SCNC: 3.4 MMOL/L — LOW (ref 3.5–5.3)
RBC # BLD: 3.86 M/UL — SIGNIFICANT CHANGE UP (ref 3.8–5.2)
RBC # FLD: 12.5 % — SIGNIFICANT CHANGE UP (ref 10.3–14.5)
SODIUM SERPL-SCNC: 135 MMOL/L — SIGNIFICANT CHANGE UP (ref 135–145)
WBC # BLD: 11.86 K/UL — HIGH (ref 3.8–10.5)
WBC # FLD AUTO: 11.86 K/UL — HIGH (ref 3.8–10.5)

## 2024-02-18 PROCEDURE — 99222 1ST HOSP IP/OBS MODERATE 55: CPT | Mod: GC

## 2024-02-18 RX ORDER — ACETAMINOPHEN 500 MG
650 TABLET ORAL EVERY 6 HOURS
Refills: 0 | Status: DISCONTINUED | OUTPATIENT
Start: 2024-02-18 | End: 2024-02-20

## 2024-02-18 RX ORDER — LANOLIN ALCOHOL/MO/W.PET/CERES
3 CREAM (GRAM) TOPICAL AT BEDTIME
Refills: 0 | Status: DISCONTINUED | OUTPATIENT
Start: 2024-02-18 | End: 2024-02-21

## 2024-02-18 RX ORDER — ONDANSETRON 8 MG/1
4 TABLET, FILM COATED ORAL EVERY 8 HOURS
Refills: 0 | Status: DISCONTINUED | OUTPATIENT
Start: 2024-02-18 | End: 2024-02-21

## 2024-02-18 RX ORDER — HYDROMORPHONE HYDROCHLORIDE 2 MG/ML
0.2 INJECTION INTRAMUSCULAR; INTRAVENOUS; SUBCUTANEOUS EVERY 4 HOURS
Refills: 0 | Status: DISCONTINUED | OUTPATIENT
Start: 2024-02-18 | End: 2024-02-18

## 2024-02-18 RX ORDER — HYDROMORPHONE HYDROCHLORIDE 2 MG/ML
0.5 INJECTION INTRAMUSCULAR; INTRAVENOUS; SUBCUTANEOUS ONCE
Refills: 0 | Status: DISCONTINUED | OUTPATIENT
Start: 2024-02-18 | End: 2024-02-18

## 2024-02-18 RX ORDER — SPIRONOLACTONE 25 MG/1
25 TABLET, FILM COATED ORAL DAILY
Refills: 0 | Status: DISCONTINUED | OUTPATIENT
Start: 2024-02-18 | End: 2024-02-21

## 2024-02-18 RX ORDER — MAGNESIUM SULFATE 500 MG/ML
2 VIAL (ML) INJECTION ONCE
Refills: 0 | Status: COMPLETED | OUTPATIENT
Start: 2024-02-18 | End: 2024-02-18

## 2024-02-18 RX ORDER — INSULIN GLARGINE 100 [IU]/ML
40 INJECTION, SOLUTION SUBCUTANEOUS AT BEDTIME
Refills: 0 | Status: DISCONTINUED | OUTPATIENT
Start: 2024-02-18 | End: 2024-02-21

## 2024-02-18 RX ORDER — NALOXONE HYDROCHLORIDE 4 MG/.1ML
0.4 SPRAY NASAL ONCE
Refills: 0 | Status: DISCONTINUED | OUTPATIENT
Start: 2024-02-18 | End: 2024-02-21

## 2024-02-18 RX ORDER — ATORVASTATIN CALCIUM 80 MG/1
20 TABLET, FILM COATED ORAL AT BEDTIME
Refills: 0 | Status: DISCONTINUED | OUTPATIENT
Start: 2024-02-18 | End: 2024-02-21

## 2024-02-18 RX ORDER — NALOXEGOL OXALATE 12.5 MG/1
25 TABLET, FILM COATED ORAL DAILY
Refills: 0 | Status: DISCONTINUED | OUTPATIENT
Start: 2024-02-18 | End: 2024-02-21

## 2024-02-18 RX ORDER — LISINOPRIL 2.5 MG/1
5 TABLET ORAL DAILY
Refills: 0 | Status: DISCONTINUED | OUTPATIENT
Start: 2024-02-18 | End: 2024-02-21

## 2024-02-18 RX ORDER — INSULIN LISPRO 100/ML
VIAL (ML) SUBCUTANEOUS
Refills: 0 | Status: DISCONTINUED | OUTPATIENT
Start: 2024-02-18 | End: 2024-02-21

## 2024-02-18 RX ORDER — INSULIN LISPRO 100/ML
15 VIAL (ML) SUBCUTANEOUS
Refills: 0 | Status: DISCONTINUED | OUTPATIENT
Start: 2024-02-18 | End: 2024-02-21

## 2024-02-18 RX ORDER — HYDROMORPHONE HYDROCHLORIDE 2 MG/ML
2 INJECTION INTRAMUSCULAR; INTRAVENOUS; SUBCUTANEOUS EVERY 6 HOURS
Refills: 0 | Status: DISCONTINUED | OUTPATIENT
Start: 2024-02-18 | End: 2024-02-20

## 2024-02-18 RX ORDER — ENOXAPARIN SODIUM 100 MG/ML
40 INJECTION SUBCUTANEOUS EVERY 24 HOURS
Refills: 0 | Status: DISCONTINUED | OUTPATIENT
Start: 2024-02-18 | End: 2024-02-21

## 2024-02-18 RX ORDER — PANTOPRAZOLE SODIUM 20 MG/1
40 TABLET, DELAYED RELEASE ORAL
Refills: 0 | Status: DISCONTINUED | OUTPATIENT
Start: 2024-02-18 | End: 2024-02-21

## 2024-02-18 RX ORDER — POLYETHYLENE GLYCOL 3350 17 G/17G
17 POWDER, FOR SOLUTION ORAL DAILY
Refills: 0 | Status: DISCONTINUED | OUTPATIENT
Start: 2024-02-18 | End: 2024-02-21

## 2024-02-18 RX ORDER — CARVEDILOL PHOSPHATE 80 MG/1
3.12 CAPSULE, EXTENDED RELEASE ORAL EVERY 12 HOURS
Refills: 0 | Status: DISCONTINUED | OUTPATIENT
Start: 2024-02-18 | End: 2024-02-21

## 2024-02-18 RX ORDER — BUMETANIDE 0.25 MG/ML
2 INJECTION INTRAMUSCULAR; INTRAVENOUS DAILY
Refills: 0 | Status: DISCONTINUED | OUTPATIENT
Start: 2024-02-18 | End: 2024-02-21

## 2024-02-18 RX ORDER — SENNA PLUS 8.6 MG/1
2 TABLET ORAL AT BEDTIME
Refills: 0 | Status: DISCONTINUED | OUTPATIENT
Start: 2024-02-18 | End: 2024-02-21

## 2024-02-18 RX ORDER — DULOXETINE HYDROCHLORIDE 30 MG/1
30 CAPSULE, DELAYED RELEASE ORAL EVERY 12 HOURS
Refills: 0 | Status: DISCONTINUED | OUTPATIENT
Start: 2024-02-18 | End: 2024-02-21

## 2024-02-18 RX ORDER — LIDOCAINE 4 G/100G
1 CREAM TOPICAL DAILY
Refills: 0 | Status: DISCONTINUED | OUTPATIENT
Start: 2024-02-18 | End: 2024-02-20

## 2024-02-18 RX ORDER — BACLOFEN 100 %
5 POWDER (GRAM) MISCELLANEOUS EVERY 12 HOURS
Refills: 0 | Status: DISCONTINUED | OUTPATIENT
Start: 2024-02-18 | End: 2024-02-20

## 2024-02-18 RX ORDER — ACETAZOLAMIDE 250 MG/1
250 TABLET ORAL
Refills: 0 | Status: DISCONTINUED | OUTPATIENT
Start: 2024-02-18 | End: 2024-02-21

## 2024-02-18 RX ORDER — HYDROMORPHONE HYDROCHLORIDE 2 MG/ML
0.5 INJECTION INTRAMUSCULAR; INTRAVENOUS; SUBCUTANEOUS EVERY 4 HOURS
Refills: 0 | Status: DISCONTINUED | OUTPATIENT
Start: 2024-02-18 | End: 2024-02-20

## 2024-02-18 RX ADMIN — ACETAZOLAMIDE 250 MILLIGRAM(S): 250 TABLET ORAL at 05:34

## 2024-02-18 RX ADMIN — HYDROMORPHONE HYDROCHLORIDE 0.5 MILLIGRAM(S): 2 INJECTION INTRAMUSCULAR; INTRAVENOUS; SUBCUTANEOUS at 11:23

## 2024-02-18 RX ADMIN — LIDOCAINE 1 PATCH: 4 CREAM TOPICAL at 21:29

## 2024-02-18 RX ADMIN — CARVEDILOL PHOSPHATE 3.12 MILLIGRAM(S): 80 CAPSULE, EXTENDED RELEASE ORAL at 17:15

## 2024-02-18 RX ADMIN — HYDROMORPHONE HYDROCHLORIDE 0.5 MILLIGRAM(S): 2 INJECTION INTRAMUSCULAR; INTRAVENOUS; SUBCUTANEOUS at 22:48

## 2024-02-18 RX ADMIN — HYDROMORPHONE HYDROCHLORIDE 0.5 MILLIGRAM(S): 2 INJECTION INTRAMUSCULAR; INTRAVENOUS; SUBCUTANEOUS at 05:30

## 2024-02-18 RX ADMIN — Medication 100 MILLIGRAM(S): at 13:44

## 2024-02-18 RX ADMIN — DULOXETINE HYDROCHLORIDE 30 MILLIGRAM(S): 30 CAPSULE, DELAYED RELEASE ORAL at 05:32

## 2024-02-18 RX ADMIN — PANTOPRAZOLE SODIUM 40 MILLIGRAM(S): 20 TABLET, DELAYED RELEASE ORAL at 06:35

## 2024-02-18 RX ADMIN — SENNA PLUS 2 TABLET(S): 8.6 TABLET ORAL at 21:36

## 2024-02-18 RX ADMIN — ONDANSETRON 8 MILLIGRAM(S): 8 TABLET, FILM COATED ORAL at 00:05

## 2024-02-18 RX ADMIN — Medication 100 MILLIGRAM(S): at 21:36

## 2024-02-18 RX ADMIN — Medication 15 UNIT(S): at 17:14

## 2024-02-18 RX ADMIN — HYDROMORPHONE HYDROCHLORIDE 1 MILLIGRAM(S): 2 INJECTION INTRAMUSCULAR; INTRAVENOUS; SUBCUTANEOUS at 01:59

## 2024-02-18 RX ADMIN — ATORVASTATIN CALCIUM 20 MILLIGRAM(S): 80 TABLET, FILM COATED ORAL at 21:29

## 2024-02-18 RX ADMIN — Medication 25 GRAM(S): at 11:24

## 2024-02-18 RX ADMIN — HYDROMORPHONE HYDROCHLORIDE 0.5 MILLIGRAM(S): 2 INJECTION INTRAMUSCULAR; INTRAVENOUS; SUBCUTANEOUS at 18:04

## 2024-02-18 RX ADMIN — HYDROMORPHONE HYDROCHLORIDE 0.5 MILLIGRAM(S): 2 INJECTION INTRAMUSCULAR; INTRAVENOUS; SUBCUTANEOUS at 13:44

## 2024-02-18 RX ADMIN — Medication 15 UNIT(S): at 08:21

## 2024-02-18 RX ADMIN — NALOXEGOL OXALATE 25 MILLIGRAM(S): 12.5 TABLET, FILM COATED ORAL at 13:17

## 2024-02-18 RX ADMIN — POLYETHYLENE GLYCOL 3350 17 GRAM(S): 17 POWDER, FOR SOLUTION ORAL at 13:16

## 2024-02-18 RX ADMIN — DULOXETINE HYDROCHLORIDE 30 MILLIGRAM(S): 30 CAPSULE, DELAYED RELEASE ORAL at 17:15

## 2024-02-18 RX ADMIN — HYDROMORPHONE HYDROCHLORIDE 0.5 MILLIGRAM(S): 2 INJECTION INTRAMUSCULAR; INTRAVENOUS; SUBCUTANEOUS at 17:39

## 2024-02-18 RX ADMIN — INSULIN GLARGINE 40 UNIT(S): 100 INJECTION, SOLUTION SUBCUTANEOUS at 21:29

## 2024-02-18 RX ADMIN — Medication 15 UNIT(S): at 12:29

## 2024-02-18 RX ADMIN — Medication 100 MILLIGRAM(S): at 05:33

## 2024-02-18 RX ADMIN — ACETAZOLAMIDE 250 MILLIGRAM(S): 250 TABLET ORAL at 17:15

## 2024-02-18 RX ADMIN — ENOXAPARIN SODIUM 40 MILLIGRAM(S): 100 INJECTION SUBCUTANEOUS at 05:31

## 2024-02-18 RX ADMIN — HYDROMORPHONE HYDROCHLORIDE 0.5 MILLIGRAM(S): 2 INJECTION INTRAMUSCULAR; INTRAVENOUS; SUBCUTANEOUS at 14:46

## 2024-02-18 RX ADMIN — HYDROMORPHONE HYDROCHLORIDE 0.5 MILLIGRAM(S): 2 INJECTION INTRAMUSCULAR; INTRAVENOUS; SUBCUTANEOUS at 11:59

## 2024-02-18 RX ADMIN — LIDOCAINE 1 PATCH: 4 CREAM TOPICAL at 09:50

## 2024-02-18 RX ADMIN — LIDOCAINE 1 PATCH: 4 CREAM TOPICAL at 07:12

## 2024-02-18 RX ADMIN — HYDROMORPHONE HYDROCHLORIDE 0.5 MILLIGRAM(S): 2 INJECTION INTRAMUSCULAR; INTRAVENOUS; SUBCUTANEOUS at 22:28

## 2024-02-18 RX ADMIN — Medication 2: at 08:22

## 2024-02-18 RX ADMIN — HYDROMORPHONE HYDROCHLORIDE 1 MILLIGRAM(S): 2 INJECTION INTRAMUSCULAR; INTRAVENOUS; SUBCUTANEOUS at 00:05

## 2024-02-18 NOTE — H&P ADULT - PROBLEM SELECTOR PLAN 1
- P/w intractable nausea and vomiting. Inability to tolerate PO.   - Likely in the setting of severe pain.  - Full liquid diet.   - IV zofran. (EKG without QT prolongation).   - Control underlying pain.

## 2024-02-18 NOTE — H&P ADULT - HISTORY OF PRESENT ILLNESS
Patient is a 52 y/o F with PMH of CHF, HTN, HLD, DM, Idiopathic intracranial hypertension (diagnosed 2020), chronic right sided back pain (intercostal neuralgia/complex regional pain syndrome) s/p spinal stimulator who presented worsening nausea since 2 days ago. Patient reports that 2 days ago she started having worsening right back pain from shoulder blade to right flank associated with severe nausea and vomiting. Patient reports due to the severe nausea/vomiting she was not able to tolerate diet and has not taken anything per oral. Patient reports she is not able to tolerate even 1 sip of water and due to this  Patient is a 54 y/o F with PMH of CHF, HTN, HLD, DM, Idiopathic intracranial hypertension (diagnosed 2020), chronic right sided back pain (intercostal neuralgia/complex regional pain syndrome) s/p spinal stimulator who presented worsening nausea since 2 days ago. Patient reports that 2 days ago she started having worsening right back pain from shoulder blade to right flank associated with severe nausea and vomiting. Patient reports due to the severe nausea/vomiting she was not able to tolerate diet and has not taken anything per oral. Patient reports she is not able to tolerate even 1 sip of water and due to this has not been able to take any of her medications. Patient reports due to this she has not been able to take her oxycodone which has worsened her back pain and nausea/vomiting. Patient denies any associated fevers, chills, diarrhea, or constipation. Patient also denies any associated headache. Patient reports the pain is the same a her prior episodes and the only reason she came to the hospital was because she is not able to tolerate oral pain medications. Patient denies any recent weakness, fatigue, malaise, headache, dizziness, lightheadedness, chest pain, palpitations, shortness of breath, cough, abdominal pain, diarrhea, constipation, melena, hematochezia, dysuria, urinary frequency, or urgency. Patient denies any other complains at this time.

## 2024-02-18 NOTE — PATIENT PROFILE ADULT - FALL HARM RISK - RISK INTERVENTIONS

## 2024-02-18 NOTE — H&P ADULT - NSHPPHYSICALEXAM_GEN_ALL_CORE
PHYSICAL EXAM:  GENERAL: NAD, speaks in full sentences, no signs of respiratory distress, obese  HEAD:  Atraumatic, Normocephalic  EYES: EOMI, PERRLA, conjunctiva and sclera clear  NECK: Supple  CHEST/LUNG: Clear to auscultation bilaterally; No wheeze; No crackles; No accessory muscles used  HEART: Regular rate and rhythm; No murmurs;   ABDOMEN: Soft, Nontender, Nondistended; Bowel sounds present; No guarding  EXTREMITIES:  2+ Peripheral Pulses, No edema  MUSCULOSKELETAL: Tenderness to palpation in anterolateral and posterior right back. No warmth noted.   PSYCH: AAOx3  NEUROLOGY: non-focal  SKIN: No rashes or lesions

## 2024-02-18 NOTE — H&P ADULT - PROBLEM SELECTOR PLAN 2
- H/o right sided complex regional pain syndrome. Presents with worsening pain not responding to oral oxycodone.   - C/w home dose of Duloxetine, pregabalin, and tylenol.   - Start dilaudid 0.2 - moderate pain.   - Dilaudid 0.5 - severe pain.   - Lidocaine patch.   - C/w baclofen.   - Consult pain management in AM.

## 2024-02-18 NOTE — H&P ADULT - ASSESSMENT
Patient is a 52 y/o F with PMH of CHF, HTN, HLD, DM, Idiopathic intracranial hypertension (diagnosed 2020), chronic right sided back pain (intercostal neuralgia/complex regional pain syndrome) s/p spinal stimulator who presented worsening nausea since 2 days ago. Patient reports that 2 days ago she started having worsening right back pain from shoulder blade to right flank associated with severe nausea and vomiting. Patient is being admitted for intractable nausea, and vomiting likely in setting of severe pain from complex regional pain syndrome.

## 2024-02-18 NOTE — H&P ADULT - ATTENDING COMMENTS
Patient is a 52 y/o F with PMH of CHF, HTN, HLD, DM, Idiopathic intracranial hypertension (diagnosed 2020), chronic right sided back pain (intercostal neuralgia/complex regional pain syndrome) s/p spinal stimulator who presented worsening nausea since 2 days ago. She reports worsening right sided pain associated with intractable nausea and vomiting, unable to tolerate PO. Vital hemodynamically stable. Physical exam with chronic right sided pain. Abdomen soft, nontender, nondistended, no guarding. Labs without significant findings.     A/P  #Intractable nausea and vomiting  #Complex regional pain syndrome  #Pseudotumor cerebri  #DM  #Chronic CHF  #HTN  #HLD  #ppx measures     - Full liquid diet. Advance as tolerated. IV hydration until patient tolerating PO  - EKG obtained, normal QTC. Zofran prn for nausea. Monitor for QTc prolongation  - Pain management in AM   Remainder of management as described in Resident's note

## 2024-02-18 NOTE — H&P ADULT - PROBLEM SELECTOR PLAN 4
- Takes metformin, Ozempic, tresceba 54U before bedtime, and novolog insulin 15 TID.   - Hold oral hypoglycemics.   - Start Lantus 40U, and Lispro 10U TID.

## 2024-02-19 LAB
A1C WITH ESTIMATED AVERAGE GLUCOSE RESULT: 7.3 % — HIGH (ref 4–5.6)
ANION GAP SERPL CALC-SCNC: 7 MMOL/L — SIGNIFICANT CHANGE UP (ref 5–17)
BUN SERPL-MCNC: 12 MG/DL — SIGNIFICANT CHANGE UP (ref 7–18)
CALCIUM SERPL-MCNC: 9.3 MG/DL — SIGNIFICANT CHANGE UP (ref 8.4–10.5)
CHLORIDE SERPL-SCNC: 106 MMOL/L — SIGNIFICANT CHANGE UP (ref 96–108)
CO2 SERPL-SCNC: 26 MMOL/L — SIGNIFICANT CHANGE UP (ref 22–31)
CREAT SERPL-MCNC: 1.07 MG/DL — SIGNIFICANT CHANGE UP (ref 0.5–1.3)
EGFR: 62 ML/MIN/1.73M2 — SIGNIFICANT CHANGE UP
ESTIMATED AVERAGE GLUCOSE: 163 MG/DL — HIGH (ref 68–114)
GLUCOSE BLDC GLUCOMTR-MCNC: 103 MG/DL — HIGH (ref 70–99)
GLUCOSE BLDC GLUCOMTR-MCNC: 119 MG/DL — HIGH (ref 70–99)
GLUCOSE BLDC GLUCOMTR-MCNC: 129 MG/DL — HIGH (ref 70–99)
GLUCOSE BLDC GLUCOMTR-MCNC: 143 MG/DL — HIGH (ref 70–99)
GLUCOSE BLDC GLUCOMTR-MCNC: 156 MG/DL — HIGH (ref 70–99)
GLUCOSE SERPL-MCNC: 158 MG/DL — HIGH (ref 70–99)
HCT VFR BLD CALC: 41.3 % — SIGNIFICANT CHANGE UP (ref 34.5–45)
HGB BLD-MCNC: 13.1 G/DL — SIGNIFICANT CHANGE UP (ref 11.5–15.5)
MAGNESIUM SERPL-MCNC: 2.1 MG/DL — SIGNIFICANT CHANGE UP (ref 1.6–2.6)
MCHC RBC-ENTMCNC: 29.8 PG — SIGNIFICANT CHANGE UP (ref 27–34)
MCHC RBC-ENTMCNC: 31.7 GM/DL — LOW (ref 32–36)
MCV RBC AUTO: 93.9 FL — SIGNIFICANT CHANGE UP (ref 80–100)
NRBC # BLD: 0 /100 WBCS — SIGNIFICANT CHANGE UP (ref 0–0)
PHOSPHATE SERPL-MCNC: 4 MG/DL — SIGNIFICANT CHANGE UP (ref 2.5–4.5)
PLATELET # BLD AUTO: 496 K/UL — HIGH (ref 150–400)
POTASSIUM SERPL-MCNC: 4.1 MMOL/L — SIGNIFICANT CHANGE UP (ref 3.5–5.3)
POTASSIUM SERPL-SCNC: 4.1 MMOL/L — SIGNIFICANT CHANGE UP (ref 3.5–5.3)
RBC # BLD: 4.4 M/UL — SIGNIFICANT CHANGE UP (ref 3.8–5.2)
RBC # FLD: 12.3 % — SIGNIFICANT CHANGE UP (ref 10.3–14.5)
SODIUM SERPL-SCNC: 139 MMOL/L — SIGNIFICANT CHANGE UP (ref 135–145)
WBC # BLD: 9.47 K/UL — SIGNIFICANT CHANGE UP (ref 3.8–10.5)
WBC # FLD AUTO: 9.47 K/UL — SIGNIFICANT CHANGE UP (ref 3.8–10.5)

## 2024-02-19 PROCEDURE — 99232 SBSQ HOSP IP/OBS MODERATE 35: CPT

## 2024-02-19 RX ADMIN — POLYETHYLENE GLYCOL 3350 17 GRAM(S): 17 POWDER, FOR SOLUTION ORAL at 11:56

## 2024-02-19 RX ADMIN — ACETAZOLAMIDE 250 MILLIGRAM(S): 250 TABLET ORAL at 05:25

## 2024-02-19 RX ADMIN — Medication 15 UNIT(S): at 11:45

## 2024-02-19 RX ADMIN — LIDOCAINE 1 PATCH: 4 CREAM TOPICAL at 11:57

## 2024-02-19 RX ADMIN — LISINOPRIL 5 MILLIGRAM(S): 2.5 TABLET ORAL at 05:26

## 2024-02-19 RX ADMIN — HYDROMORPHONE HYDROCHLORIDE 0.5 MILLIGRAM(S): 2 INJECTION INTRAMUSCULAR; INTRAVENOUS; SUBCUTANEOUS at 05:49

## 2024-02-19 RX ADMIN — ENOXAPARIN SODIUM 40 MILLIGRAM(S): 100 INJECTION SUBCUTANEOUS at 05:25

## 2024-02-19 RX ADMIN — DULOXETINE HYDROCHLORIDE 30 MILLIGRAM(S): 30 CAPSULE, DELAYED RELEASE ORAL at 18:21

## 2024-02-19 RX ADMIN — CARVEDILOL PHOSPHATE 3.12 MILLIGRAM(S): 80 CAPSULE, EXTENDED RELEASE ORAL at 18:22

## 2024-02-19 RX ADMIN — ACETAZOLAMIDE 250 MILLIGRAM(S): 250 TABLET ORAL at 18:31

## 2024-02-19 RX ADMIN — DULOXETINE HYDROCHLORIDE 30 MILLIGRAM(S): 30 CAPSULE, DELAYED RELEASE ORAL at 05:25

## 2024-02-19 RX ADMIN — SPIRONOLACTONE 25 MILLIGRAM(S): 25 TABLET, FILM COATED ORAL at 05:25

## 2024-02-19 RX ADMIN — ATORVASTATIN CALCIUM 20 MILLIGRAM(S): 80 TABLET, FILM COATED ORAL at 21:19

## 2024-02-19 RX ADMIN — HYDROMORPHONE HYDROCHLORIDE 2 MILLIGRAM(S): 2 INJECTION INTRAMUSCULAR; INTRAVENOUS; SUBCUTANEOUS at 14:02

## 2024-02-19 RX ADMIN — SENNA PLUS 2 TABLET(S): 8.6 TABLET ORAL at 21:19

## 2024-02-19 RX ADMIN — Medication 3 MILLIGRAM(S): at 21:19

## 2024-02-19 RX ADMIN — HYDROMORPHONE HYDROCHLORIDE 0.5 MILLIGRAM(S): 2 INJECTION INTRAMUSCULAR; INTRAVENOUS; SUBCUTANEOUS at 21:50

## 2024-02-19 RX ADMIN — HYDROMORPHONE HYDROCHLORIDE 2 MILLIGRAM(S): 2 INJECTION INTRAMUSCULAR; INTRAVENOUS; SUBCUTANEOUS at 13:02

## 2024-02-19 RX ADMIN — BUMETANIDE 2 MILLIGRAM(S): 0.25 INJECTION INTRAMUSCULAR; INTRAVENOUS at 05:25

## 2024-02-19 RX ADMIN — HYDROMORPHONE HYDROCHLORIDE 0.5 MILLIGRAM(S): 2 INJECTION INTRAMUSCULAR; INTRAVENOUS; SUBCUTANEOUS at 21:20

## 2024-02-19 RX ADMIN — Medication 100 MILLIGRAM(S): at 05:24

## 2024-02-19 RX ADMIN — HYDROMORPHONE HYDROCHLORIDE 0.5 MILLIGRAM(S): 2 INJECTION INTRAMUSCULAR; INTRAVENOUS; SUBCUTANEOUS at 11:05

## 2024-02-19 RX ADMIN — Medication 1: at 11:45

## 2024-02-19 RX ADMIN — HYDROMORPHONE HYDROCHLORIDE 0.5 MILLIGRAM(S): 2 INJECTION INTRAMUSCULAR; INTRAVENOUS; SUBCUTANEOUS at 10:05

## 2024-02-19 RX ADMIN — LIDOCAINE 1 PATCH: 4 CREAM TOPICAL at 09:58

## 2024-02-19 RX ADMIN — PANTOPRAZOLE SODIUM 40 MILLIGRAM(S): 20 TABLET, DELAYED RELEASE ORAL at 06:02

## 2024-02-19 RX ADMIN — LIDOCAINE 1 PATCH: 4 CREAM TOPICAL at 20:37

## 2024-02-19 RX ADMIN — Medication 15 UNIT(S): at 16:50

## 2024-02-19 RX ADMIN — HYDROMORPHONE HYDROCHLORIDE 0.5 MILLIGRAM(S): 2 INJECTION INTRAMUSCULAR; INTRAVENOUS; SUBCUTANEOUS at 05:24

## 2024-02-19 RX ADMIN — Medication 100 MILLIGRAM(S): at 13:06

## 2024-02-19 RX ADMIN — NALOXEGOL OXALATE 25 MILLIGRAM(S): 12.5 TABLET, FILM COATED ORAL at 11:56

## 2024-02-19 RX ADMIN — INSULIN GLARGINE 40 UNIT(S): 100 INJECTION, SOLUTION SUBCUTANEOUS at 21:19

## 2024-02-19 RX ADMIN — Medication 100 MILLIGRAM(S): at 21:19

## 2024-02-19 RX ADMIN — HYDROMORPHONE HYDROCHLORIDE 0.5 MILLIGRAM(S): 2 INJECTION INTRAMUSCULAR; INTRAVENOUS; SUBCUTANEOUS at 16:51

## 2024-02-19 NOTE — PROGRESS NOTE ADULT - SUBJECTIVE AND OBJECTIVE BOX
Hospitalist Attending Progress Note    Please contact on TEAMS TILL 5:00 PM  PLEASE CONTACT ON CALL TEAM:  - On Call Team (Please refer to Dave) FROM 5:00 PM - 8:30PM  - Nightfloat Team FROM 8:30 -7:30 AM    CHIEF COMPLAINT & BRIEF HOSPITAL COURSE:    INTERVAL HPI/OVERNIGHT EVENTS:       REVIEW OF SYSTEMS:  CONSTITUTIONAL: No fever, weight loss, or fatigue  RESPIRATORY: No cough, wheezing, chills or hemoptysis; No shortness of breath  CARDIOVASCULAR: No chest pain, palpitations, dizziness, or leg swelling  GASTROINTESTINAL: No abdominal pain. No nausea, vomiting, or hematemesis; No diarrhea or constipation. No melena or hematochezia.  GENITOURINARY: No dysuria or hematuria, urinary frequency  NEUROLOGICAL: No headaches, memory loss, loss of strength, numbness, or tremors  SKIN: No itching, burning, rashes, or lesions     MEDICATIONS  (STANDING):  acetaZOLAMIDE    Tablet 250 milliGRAM(s) Oral two times a day  atorvastatin 20 milliGRAM(s) Oral at bedtime  buMETAnide 2 milliGRAM(s) Oral daily  carvedilol 3.125 milliGRAM(s) Oral every 12 hours  DULoxetine 30 milliGRAM(s) Oral every 12 hours  enoxaparin Injectable 40 milliGRAM(s) SubCutaneous every 24 hours  insulin glargine Injectable (LANTUS) 40 Unit(s) SubCutaneous at bedtime  insulin lispro (ADMELOG) corrective regimen sliding scale   SubCutaneous Before meals and at bedtime  insulin lispro Injectable (ADMELOG) 15 Unit(s) SubCutaneous three times a day before meals  lidocaine   4% Patch 1 Patch Transdermal daily  lisinopril 5 milliGRAM(s) Oral daily  naloxegol 25 milliGRAM(s) Oral daily  naloxone Injectable 0.4 milliGRAM(s) IV Push once  pantoprazole    Tablet 40 milliGRAM(s) Oral before breakfast  polyethylene glycol 3350 17 Gram(s) Oral daily  pregabalin 100 milliGRAM(s) Oral three times a day  senna 2 Tablet(s) Oral at bedtime  spironolactone 25 milliGRAM(s) Oral daily    MEDICATIONS  (PRN):  acetaminophen     Tablet .. 650 milliGRAM(s) Oral every 6 hours PRN Temp greater or equal to 38C (100.4F), Mild Pain (1 - 3)  baclofen 5 milliGRAM(s) Oral every 12 hours PRN Musculoskeletal Pain  bisacodyl 5 milliGRAM(s) Oral daily PRN Constipation  HYDROmorphone   Tablet 2 milliGRAM(s) Oral every 6 hours PRN Moderate Pain (4 - 6)  HYDROmorphone  Injectable 0.5 milliGRAM(s) IV Push every 4 hours PRN Severe Pain (7 - 10)  melatonin 3 milliGRAM(s) Oral at bedtime PRN Insomnia  ondansetron Injectable 4 milliGRAM(s) IV Push every 8 hours PRN Nausea and/or Vomiting      Vital Signs Last 24 Hrs  T(C): 36.7 (19 Feb 2024 12:15), Max: 36.7 (18 Feb 2024 21:39)  T(F): 98.1 (19 Feb 2024 12:15), Max: 98.1 (18 Feb 2024 21:39)  HR: 55 (19 Feb 2024 12:15) (55 - 61)  BP: 123/63 (19 Feb 2024 12:15) (106/60 - 123/63)  BP(mean): --  RR: 18 (19 Feb 2024 12:15) (17 - 18)  SpO2: 94% (19 Feb 2024 12:15) (93% - 94%)    Parameters below as of 19 Feb 2024 12:15  Patient On (Oxygen Delivery Method): room air        PHYSICAL EXAMINATION:  GENERAL: NAD, well built  HEAD:  Atraumatic, Normocephalic  EYES:  conjunctiva and sclera clear  NECK: Supple, No JVD, Normal thyroid  CHEST/LUNG: Clear to auscultation. Clear to percussion bilaterally; No rales, rhonchi, wheezing, or rubs  HEART: Regular rate and rhythm; No murmurs, rubs, or gallops  ABDOMEN: Soft, Nontender, Nondistended; Bowel sounds present  NERVOUS SYSTEM:  Alert & Oriented X3,    EXTREMITIES:  2+ Peripheral Pulses, No clubbing, cyanosis, or edema  SKIN: warm dry                          13.1   9.47  )-----------( 496      ( 19 Feb 2024 05:45 )             41.3     02-19    139  |  106  |  12  ----------------------------<  158<H>  4.1   |  26  |  1.07    Ca    9.3      19 Feb 2024 05:45  Phos  4.0     02-19  Mg     2.1     02-19    TPro  7.7  /  Alb  3.2<L>  /  TBili  0.5  /  DBili  x   /  AST  13  /  ALT  23  /  AlkPhos  115  02-17    LIVER FUNCTIONS - ( 17 Feb 2024 21:30 )  Alb: 3.2 g/dL / Pro: 7.7 g/dL / ALK PHOS: 115 U/L / ALT: 23 U/L DA / AST: 13 U/L / GGT: x                   CAPILLARY BLOOD GLUCOSE      RADIOLOGY & ADDITIONAL TESTS:                   Hospitalist Attending Progress Note    Please contact on TEAMS TILL 5:00 PM  PLEASE CONTACT ON CALL TEAM:  - On Call Team (Please refer to Dave) FROM 5:00 PM - 8:30PM  - Nightfloat Team FROM 8:30 -7:30 AM    CHIEF COMPLAINT & BRIEF HOSPITAL COURSE:  54 y/o F with PMH of CHF, HTN, HLD, DM, Idiopathic intracranial hypertension (diagnosed 2020), chronic right sided back pain (intercostal neuralgia/complex regional pain syndrome) s/p spinal stimulator who presented with worsening nausea since 2 days ago. She said she has had flares before but that this was triggered by vomiting and inability to take her normal PO medication. She has been in the process of getting an appointment with a new pain management specialist but was unable to. Admitted for intractable pain 2/2 to CRPS flare up.    INTERVAL HPI/OVERNIGHT EVENTS:   She continues to have breakthrough pain due to her flare up but is mildly better controlled since starting PO dilaudid yesterday.   Awaiting pain management input for further optimization.    REVIEW OF SYSTEMS:  CONSTITUTIONAL: Fatigue, generalized pain ++  RESPIRATORY: No cough, wheezing, chills or hemoptysis; No shortness of breath  CARDIOVASCULAR: No chest pain, palpitations, dizziness, or leg swelling  GASTROINTESTINAL: No abdominal pain. No nausea, vomiting, or hematemesis; No diarrhea or constipation. No melena or hematochezia.  GENITOURINARY: No dysuria or hematuria, urinary frequency  NEUROLOGICAL: No headaches, memory loss, loss of strength, numbness, or tremors  SKIN: Burning and itching ++ No rashes, or lesions     MEDICATIONS  (STANDING):  acetaZOLAMIDE    Tablet 250 milliGRAM(s) Oral two times a day  atorvastatin 20 milliGRAM(s) Oral at bedtime  buMETAnide 2 milliGRAM(s) Oral daily  carvedilol 3.125 milliGRAM(s) Oral every 12 hours  DULoxetine 30 milliGRAM(s) Oral every 12 hours  enoxaparin Injectable 40 milliGRAM(s) SubCutaneous every 24 hours  insulin glargine Injectable (LANTUS) 40 Unit(s) SubCutaneous at bedtime  insulin lispro (ADMELOG) corrective regimen sliding scale   SubCutaneous Before meals and at bedtime  insulin lispro Injectable (ADMELOG) 15 Unit(s) SubCutaneous three times a day before meals  lidocaine   4% Patch 1 Patch Transdermal daily  lisinopril 5 milliGRAM(s) Oral daily  naloxegol 25 milliGRAM(s) Oral daily  naloxone Injectable 0.4 milliGRAM(s) IV Push once  pantoprazole    Tablet 40 milliGRAM(s) Oral before breakfast  polyethylene glycol 3350 17 Gram(s) Oral daily  pregabalin 100 milliGRAM(s) Oral three times a day  senna 2 Tablet(s) Oral at bedtime  spironolactone 25 milliGRAM(s) Oral daily    MEDICATIONS  (PRN):  acetaminophen     Tablet .. 650 milliGRAM(s) Oral every 6 hours PRN Temp greater or equal to 38C (100.4F), Mild Pain (1 - 3)  baclofen 5 milliGRAM(s) Oral every 12 hours PRN Musculoskeletal Pain  bisacodyl 5 milliGRAM(s) Oral daily PRN Constipation  HYDROmorphone   Tablet 2 milliGRAM(s) Oral every 6 hours PRN Moderate Pain (4 - 6)  HYDROmorphone  Injectable 0.5 milliGRAM(s) IV Push every 4 hours PRN Severe Pain (7 - 10)  melatonin 3 milliGRAM(s) Oral at bedtime PRN Insomnia  ondansetron Injectable 4 milliGRAM(s) IV Push every 8 hours PRN Nausea and/or Vomiting      Vital Signs Last 24 Hrs  T(C): 36.7 (19 Feb 2024 12:15), Max: 36.7 (18 Feb 2024 21:39)  T(F): 98.1 (19 Feb 2024 12:15), Max: 98.1 (18 Feb 2024 21:39)  HR: 55 (19 Feb 2024 12:15) (55 - 61)  BP: 123/63 (19 Feb 2024 12:15) (106/60 - 123/63)  BP(mean): --  RR: 18 (19 Feb 2024 12:15) (17 - 18)  SpO2: 94% (19 Feb 2024 12:15) (93% - 94%)    Parameters below as of 19 Feb 2024 12:15  Patient On (Oxygen Delivery Method): room air        PHYSICAL EXAMINATION:  GENERAL: NAD, obese woman, lying in bed in some discomfort   HEAD:  Atraumatic, Normocephalic  EYES:  conjunctiva and sclera clear  NECK: Supple, No JVD, Normal thyroid  CHEST/LUNG: Clear to auscultation. Clear to percussion bilaterally; No rales, rhonchi, wheezing, or rubs  HEART: Regular rate and rhythm; No murmurs, rubs, or gallops  ABDOMEN: Soft, Nontender, distended; Bowel sounds present  NERVOUS SYSTEM:  Alert & Oriented X3,  sensory burning in multiple dermatomes   EXTREMITIES:  2+ Peripheral Pulses, No clubbing, cyanosis, or edema  SKIN: warm dry                          13.1   9.47  )-----------( 496      ( 19 Feb 2024 05:45 )             41.3     02-19    139  |  106  |  12  ----------------------------<  158<H>  4.1   |  26  |  1.07    Ca    9.3      19 Feb 2024 05:45  Phos  4.0     02-19  Mg     2.1     02-19    TPro  7.7  /  Alb  3.2<L>  /  TBili  0.5  /  DBili  x   /  AST  13  /  ALT  23  /  AlkPhos  115  02-17    LIVER FUNCTIONS - ( 17 Feb 2024 21:30 )  Alb: 3.2 g/dL / Pro: 7.7 g/dL / ALK PHOS: 115 U/L / ALT: 23 U/L DA / AST: 13 U/L / GGT: x                   CAPILLARY BLOOD GLUCOSE      RADIOLOGY & ADDITIONAL TESTS:

## 2024-02-20 DIAGNOSIS — Z96.89 PRESENCE OF OTHER SPECIFIED FUNCTIONAL IMPLANTS: ICD-10-CM

## 2024-02-20 DIAGNOSIS — G58.8 OTHER SPECIFIED MONONEUROPATHIES: ICD-10-CM

## 2024-02-20 DIAGNOSIS — F12.90 CANNABIS USE, UNSPECIFIED, UNCOMPLICATED: ICD-10-CM

## 2024-02-20 DIAGNOSIS — Z02.9 ENCOUNTER FOR ADMINISTRATIVE EXAMINATIONS, UNSPECIFIED: ICD-10-CM

## 2024-02-20 DIAGNOSIS — F11.20 OPIOID DEPENDENCE, UNCOMPLICATED: ICD-10-CM

## 2024-02-20 LAB
ANION GAP SERPL CALC-SCNC: 4 MMOL/L — LOW (ref 5–17)
BUN SERPL-MCNC: 15 MG/DL — SIGNIFICANT CHANGE UP (ref 7–18)
CALCIUM SERPL-MCNC: 8.9 MG/DL — SIGNIFICANT CHANGE UP (ref 8.4–10.5)
CHLORIDE SERPL-SCNC: 107 MMOL/L — SIGNIFICANT CHANGE UP (ref 96–108)
CO2 SERPL-SCNC: 23 MMOL/L — SIGNIFICANT CHANGE UP (ref 22–31)
CREAT SERPL-MCNC: 0.98 MG/DL — SIGNIFICANT CHANGE UP (ref 0.5–1.3)
EGFR: 69 ML/MIN/1.73M2 — SIGNIFICANT CHANGE UP
GLUCOSE BLDC GLUCOMTR-MCNC: 106 MG/DL — HIGH (ref 70–99)
GLUCOSE BLDC GLUCOMTR-MCNC: 130 MG/DL — HIGH (ref 70–99)
GLUCOSE BLDC GLUCOMTR-MCNC: 174 MG/DL — HIGH (ref 70–99)
GLUCOSE BLDC GLUCOMTR-MCNC: 180 MG/DL — HIGH (ref 70–99)
GLUCOSE SERPL-MCNC: 187 MG/DL — HIGH (ref 70–99)
HCT VFR BLD CALC: 39.7 % — SIGNIFICANT CHANGE UP (ref 34.5–45)
HGB BLD-MCNC: 12.5 G/DL — SIGNIFICANT CHANGE UP (ref 11.5–15.5)
MCHC RBC-ENTMCNC: 29.1 PG — SIGNIFICANT CHANGE UP (ref 27–34)
MCHC RBC-ENTMCNC: 31.5 GM/DL — LOW (ref 32–36)
MCV RBC AUTO: 92.3 FL — SIGNIFICANT CHANGE UP (ref 80–100)
NRBC # BLD: 0 /100 WBCS — SIGNIFICANT CHANGE UP (ref 0–0)
PLATELET # BLD AUTO: 466 K/UL — HIGH (ref 150–400)
POTASSIUM SERPL-MCNC: 4 MMOL/L — SIGNIFICANT CHANGE UP (ref 3.5–5.3)
POTASSIUM SERPL-SCNC: 4 MMOL/L — SIGNIFICANT CHANGE UP (ref 3.5–5.3)
RBC # BLD: 4.3 M/UL — SIGNIFICANT CHANGE UP (ref 3.8–5.2)
RBC # FLD: 12.4 % — SIGNIFICANT CHANGE UP (ref 10.3–14.5)
SODIUM SERPL-SCNC: 134 MMOL/L — LOW (ref 135–145)
WBC # BLD: 8.34 K/UL — SIGNIFICANT CHANGE UP (ref 3.8–10.5)
WBC # FLD AUTO: 8.34 K/UL — SIGNIFICANT CHANGE UP (ref 3.8–10.5)

## 2024-02-20 PROCEDURE — 99222 1ST HOSP IP/OBS MODERATE 55: CPT

## 2024-02-20 PROCEDURE — 99232 SBSQ HOSP IP/OBS MODERATE 35: CPT

## 2024-02-20 RX ORDER — ACETAMINOPHEN 500 MG
1000 TABLET ORAL EVERY 8 HOURS
Refills: 0 | Status: DISCONTINUED | OUTPATIENT
Start: 2024-02-20 | End: 2024-02-21

## 2024-02-20 RX ORDER — HYDROMORPHONE HYDROCHLORIDE 2 MG/ML
4 INJECTION INTRAMUSCULAR; INTRAVENOUS; SUBCUTANEOUS EVERY 4 HOURS
Refills: 0 | Status: DISCONTINUED | OUTPATIENT
Start: 2024-02-20 | End: 2024-02-21

## 2024-02-20 RX ORDER — METHOCARBAMOL 500 MG/1
500 TABLET, FILM COATED ORAL THREE TIMES A DAY
Refills: 0 | Status: DISCONTINUED | OUTPATIENT
Start: 2024-02-20 | End: 2024-02-21

## 2024-02-20 RX ORDER — HYDROMORPHONE HYDROCHLORIDE 2 MG/ML
0.5 INJECTION INTRAMUSCULAR; INTRAVENOUS; SUBCUTANEOUS ONCE
Refills: 0 | Status: DISCONTINUED | OUTPATIENT
Start: 2024-02-20 | End: 2024-02-20

## 2024-02-20 RX ORDER — LIDOCAINE 4 G/100G
3 CREAM TOPICAL DAILY
Refills: 0 | Status: DISCONTINUED | OUTPATIENT
Start: 2024-02-20 | End: 2024-02-21

## 2024-02-20 RX ADMIN — NALOXEGOL OXALATE 25 MILLIGRAM(S): 12.5 TABLET, FILM COATED ORAL at 11:51

## 2024-02-20 RX ADMIN — Medication 150 MILLIGRAM(S): at 21:48

## 2024-02-20 RX ADMIN — Medication 1000 MILLIGRAM(S): at 21:48

## 2024-02-20 RX ADMIN — HYDROMORPHONE HYDROCHLORIDE 0.5 MILLIGRAM(S): 2 INJECTION INTRAMUSCULAR; INTRAVENOUS; SUBCUTANEOUS at 11:51

## 2024-02-20 RX ADMIN — POLYETHYLENE GLYCOL 3350 17 GRAM(S): 17 POWDER, FOR SOLUTION ORAL at 11:12

## 2024-02-20 RX ADMIN — PANTOPRAZOLE SODIUM 40 MILLIGRAM(S): 20 TABLET, DELAYED RELEASE ORAL at 05:37

## 2024-02-20 RX ADMIN — HYDROMORPHONE HYDROCHLORIDE 4 MILLIGRAM(S): 2 INJECTION INTRAMUSCULAR; INTRAVENOUS; SUBCUTANEOUS at 20:30

## 2024-02-20 RX ADMIN — SPIRONOLACTONE 25 MILLIGRAM(S): 25 TABLET, FILM COATED ORAL at 05:36

## 2024-02-20 RX ADMIN — Medication 1000 MILLIGRAM(S): at 13:10

## 2024-02-20 RX ADMIN — Medication 3 MILLIGRAM(S): at 21:48

## 2024-02-20 RX ADMIN — LISINOPRIL 5 MILLIGRAM(S): 2.5 TABLET ORAL at 05:37

## 2024-02-20 RX ADMIN — ACETAZOLAMIDE 250 MILLIGRAM(S): 250 TABLET ORAL at 05:36

## 2024-02-20 RX ADMIN — DULOXETINE HYDROCHLORIDE 30 MILLIGRAM(S): 30 CAPSULE, DELAYED RELEASE ORAL at 17:05

## 2024-02-20 RX ADMIN — Medication 15 UNIT(S): at 16:25

## 2024-02-20 RX ADMIN — HYDROMORPHONE HYDROCHLORIDE 0.5 MILLIGRAM(S): 2 INJECTION INTRAMUSCULAR; INTRAVENOUS; SUBCUTANEOUS at 06:45

## 2024-02-20 RX ADMIN — ACETAZOLAMIDE 250 MILLIGRAM(S): 250 TABLET ORAL at 17:05

## 2024-02-20 RX ADMIN — HYDROMORPHONE HYDROCHLORIDE 4 MILLIGRAM(S): 2 INJECTION INTRAMUSCULAR; INTRAVENOUS; SUBCUTANEOUS at 19:38

## 2024-02-20 RX ADMIN — METHOCARBAMOL 500 MILLIGRAM(S): 500 TABLET, FILM COATED ORAL at 11:50

## 2024-02-20 RX ADMIN — Medication 1: at 11:35

## 2024-02-20 RX ADMIN — LIDOCAINE 3 PATCH: 4 CREAM TOPICAL at 23:21

## 2024-02-20 RX ADMIN — ENOXAPARIN SODIUM 40 MILLIGRAM(S): 100 INJECTION SUBCUTANEOUS at 05:33

## 2024-02-20 RX ADMIN — SENNA PLUS 2 TABLET(S): 8.6 TABLET ORAL at 21:47

## 2024-02-20 RX ADMIN — LIDOCAINE 3 PATCH: 4 CREAM TOPICAL at 11:12

## 2024-02-20 RX ADMIN — Medication 100 MILLIGRAM(S): at 05:37

## 2024-02-20 RX ADMIN — HYDROMORPHONE HYDROCHLORIDE 0.5 MILLIGRAM(S): 2 INJECTION INTRAMUSCULAR; INTRAVENOUS; SUBCUTANEOUS at 13:09

## 2024-02-20 RX ADMIN — METHOCARBAMOL 500 MILLIGRAM(S): 500 TABLET, FILM COATED ORAL at 13:09

## 2024-02-20 RX ADMIN — LIDOCAINE 3 PATCH: 4 CREAM TOPICAL at 18:22

## 2024-02-20 RX ADMIN — Medication 150 MILLIGRAM(S): at 13:10

## 2024-02-20 RX ADMIN — DULOXETINE HYDROCHLORIDE 30 MILLIGRAM(S): 30 CAPSULE, DELAYED RELEASE ORAL at 05:35

## 2024-02-20 RX ADMIN — METHOCARBAMOL 500 MILLIGRAM(S): 500 TABLET, FILM COATED ORAL at 21:47

## 2024-02-20 RX ADMIN — LIDOCAINE 1 PATCH: 4 CREAM TOPICAL at 08:51

## 2024-02-20 RX ADMIN — INSULIN GLARGINE 40 UNIT(S): 100 INJECTION, SOLUTION SUBCUTANEOUS at 21:51

## 2024-02-20 RX ADMIN — HYDROMORPHONE HYDROCHLORIDE 0.5 MILLIGRAM(S): 2 INJECTION INTRAMUSCULAR; INTRAVENOUS; SUBCUTANEOUS at 14:13

## 2024-02-20 RX ADMIN — Medication 1000 MILLIGRAM(S): at 14:13

## 2024-02-20 RX ADMIN — ATORVASTATIN CALCIUM 20 MILLIGRAM(S): 80 TABLET, FILM COATED ORAL at 21:48

## 2024-02-20 RX ADMIN — Medication 15 UNIT(S): at 11:34

## 2024-02-20 RX ADMIN — BUMETANIDE 2 MILLIGRAM(S): 0.25 INJECTION INTRAMUSCULAR; INTRAVENOUS at 05:37

## 2024-02-20 RX ADMIN — HYDROMORPHONE HYDROCHLORIDE 0.5 MILLIGRAM(S): 2 INJECTION INTRAMUSCULAR; INTRAVENOUS; SUBCUTANEOUS at 09:38

## 2024-02-20 RX ADMIN — LIDOCAINE 1 PATCH: 4 CREAM TOPICAL at 11:33

## 2024-02-20 RX ADMIN — HYDROMORPHONE HYDROCHLORIDE 0.5 MILLIGRAM(S): 2 INJECTION INTRAMUSCULAR; INTRAVENOUS; SUBCUTANEOUS at 05:52

## 2024-02-20 RX ADMIN — HYDROMORPHONE HYDROCHLORIDE 0.5 MILLIGRAM(S): 2 INJECTION INTRAMUSCULAR; INTRAVENOUS; SUBCUTANEOUS at 10:04

## 2024-02-20 RX ADMIN — Medication 15 UNIT(S): at 08:49

## 2024-02-20 NOTE — CONSULT NOTE ADULT - ASSESSMENT
Confidential Drug Utilization Report  Search Terms: Fauzia Linares, 1970Search Date: 02/20/2024 09:22:53 AM  The Drug Utilization Report below displays all of the controlled substance prescriptions, if any, that your patient has filled in the last twelve months. The information displayed on this report is compiled from pharmacy submissions to the Department, and accurately reflects the information as submitted by the pharmacies.    This report was requested by: Marianela Stark | Reference #: [id number]    You have not added a CROW number. Keeping your CROW number(s) up to date on the My CROW # tab will enable the separation of your prescriptions from others in the search results.    Practitioner Count: 3  Pharmacy Count: 3  Current Opioid Prescriptions: 1  Current Benzodiazepine Prescriptions: 0  Current Stimulant Prescriptions: 0      Patient Demographic Information (PDI)       PDI	First Name	Last Name	Birth Date	Gender	Street Address	[facility]  A	Fauzia Linares	1970	Female	[address]	[id number]  B	Fauzia Linares	1970	Female	[address]	[facility]	[address]  C	Fauzia Linares	1970	Female	[address]	[facility] [facility]	[place]	[address]  D	Fauzia Linares	1970	Female	[address]	[facility]	[place]	[address]  E	Fauzia Linares	1970	Female	[address] [facility]	[address]    Prescription Information      PDI Filter:    PDI	Current Rx	Drug Type	Rx Written	Rx Dispensed	Drug	Quantity	Days Supply	Prescriber Name	Prescriber CROW #	Payment Method	Dispenser  A	N		10/06/2023	10/07/2023	pregabalin 100 mg capsule	90	30	Tc, Reba	[id number]	Medicare	[facility]  A	N	O	09/28/2023	10/01/2023	oxycodone hcl (ir) 15 mg tab	30	5	Tc, Reba	[id number]	Medicare	[facility]  A	N	O	09/27/2023	09/27/2023	oxycodone hcl (ir) 15 mg tab	30	8	Tc, Reba	[id number]	Medicare	[facility]  A	N	O	09/10/2023	09/10/2023	oxycodone hcl (ir) 15 mg tab	30	5	Tc, Reba	[id number]	Medicare	[facility]  A	N		09/06/2023	09/06/2023	pregabalin 100 mg capsule	90	30	Tc, Reba	[id number]	Medicare	[facility]  A	N	O	08/27/2023	08/28/2023	oxycodone hcl (ir) 15 mg tab	30	5	Tc, Reba	[id number]	Medicare	[facility]  A	N	O	08/12/2023	08/13/2023	oxycodone hcl (ir) 15 mg tab	30	5	Tc, Reba	[id number]	Medicare	[facility]  A	N		08/04/2023	08/05/2023	pregabalin 100 mg capsule	90	30	Tc, Reba	[id number]	Medicare	[facility]  A	N		07/21/2023	07/22/2023	pregabalin 100 mg capsule	42	14	Tc, Reba	[id number]	Medicare	[facility]  A	N	O	07/16/2023	07/16/2023	oxycodone hcl (ir) 15 mg tab	30	7	Tc, Reba	[id number]	Medicare	[facility]  A	N	O	06/30/2023	07/01/2023	oxycodone hcl (ir) 15 mg tab	42	10	Tc, Reba	[id number]	Medicare	[facility]  A	N		06/19/2023	06/20/2023	pregabalin 100 mg capsule	90	30	Tc, Reba	[id number]	Medicare	[facility]  A	N	O	06/06/2023	06/07/2023	oxycodone hcl (ir) 15 mg tab	42	7	Tc, Reba	[id number]	Medicare	[facility]  A	N	O	05/22/2023	05/23/2023	oxycodone hcl (ir) 15 mg tab	42	10	Tc, Reba	[id number]	Medicare	[facility]  A	N		05/19/2023	05/20/2023	pregabalin 100 mg capsule	90	30	Tc, Reba	[id number]	Medicare	[facility]  A	N	O	04/22/2023	04/23/2023	oxycodone hcl (ir) 15 mg tab	90	15	Tc, Reba	[id number]	Medicare	[facility]  A	N		04/17/2023	04/18/2023	pregabalin 100 mg capsule	90	30	Naomi Ybarra	[id number]	Medicare	[facility]  A	N	O	03/30/2023	03/31/2023	oxycodone hcl (ir) 15 mg tab	60	15	Tc, Reba	[id number]	Medicare	[facility]  A	N	O	03/21/2023	03/21/2023	oxycodone hcl (ir) 15 mg tab	28	7	[facility]	[id number]	Medicare	[facility]  A	N		03/19/2023	03/19/2023	pregabalin 100 mg capsule	90	30	Tc, Reba	[id number]	Medicare	[facility]  A	N	O	03/02/2023	03/03/2023	oxycodone hcl (ir) 15 mg tab	56	10	Tc, Reba	[id number]	Medicare	[facility]  B	N	O	11/28/2023	11/30/2023	oxycodone hcl (ir) 20 mg tab	60	30	Vinny Maldonado MD	[id number]	Insurance	[id number]  B	N	O	11/16/2023	11/18/2023	oxycodone-acetaminophen  mg tab	30	15	Vinny Maldonado MD	[id number]	Insurance	[id number]  B	N		11/16/2023	11/18/2023	pregabalin 100 mg capsule	90	30	Vinny Maldonado MD	[id number]	Insurance	[id number]  B	N		09/28/2023	11/01/2023	pregabalin 100 mg capsule	30	10	Tc, Reba	[id number]	Insurance	[id number]  C	N	O	12/29/2023	12/29/2023	oxycodone hcl (ir) 15 mg tab	12	3	Christopher Montiel	[id number]	Insurance	[facility]	N		12/29/2023	12/29/2023	butalbital-acetaminophen-caffeine -40 mg tablet	53	8	Christopher Montiel	[id number]	Insurance	[facility]	N	O	10/12/2023	10/14/2023	oxycodone hcl (ir) 15 mg tab	28	7	Tate, Naomi	[id number]	[id number]  D	N		02/08/2024	02/09/2024	pregabalin 100 mg capsule	42	7	Alba Jasso M	[id number]	[facility]  D	N	O	02/08/2024	02/09/2024	hydromorphone 4 mg tablet	20	5	Alba Jasso M	[id number]	[facility]  D	Y		01/25/2024	01/26/2024	pregabalin 100 mg capsule	90	30	Vinny Maldonado MD	[id number]	[facility]  D	Y	O	01/25/2024	01/26/2024	oxycodone hcl (ir) 20 mg tab	60	30	Vinny Maldonado MD	[id number]	[facility]  E	N		09/28/2023	10/02/2023	pregabalin 100 mg capsule	30	10	Reba Montez	[id number]	[id number]    * - Details of Drug Type : O = Opioid, B = Benzodiazepine, S = Stimulant    * - Drugs marked with an asterisk are compound drugs. If the compound drug is made up of more than one controlled substance, then each controlled substance will be a separate row in the table.

## 2024-02-20 NOTE — CONSULT NOTE ADULT - SUBJECTIVE AND OBJECTIVE BOX
Source of information: ROBIN LÓPEZ, Chart review  Patient language: English  : n/a    HPI:  Patient is a 54 y/o F with PMH of CHF, HTN, HLD, DM, Idiopathic intracranial hypertension (diagnosed 2020), chronic right sided back pain (intercostal neuralgia/complex regional pain syndrome) s/p spinal stimulator who presented worsening nausea since 2 days ago. Patient reports that 2 days ago she started having worsening right back pain from shoulder blade to right flank associated with severe nausea and vomiting. Patient reports due to the severe nausea/vomiting she was not able to tolerate diet and has not taken anything per oral. Patient reports she is not able to tolerate even 1 sip of water and due to this has not been able to take any of her medications. Patient reports due to this she has not been able to take her oxycodone which has worsened her back pain and nausea/vomiting. Patient denies any associated fevers, chills, diarrhea, or constipation. Patient also denies any associated headache. Patient reports the pain is the same a her prior episodes and the only reason she came to the hospital was because she is not able to tolerate oral pain medications. Patient denies any recent weakness, fatigue, malaise, headache, dizziness, lightheadedness, chest pain, palpitations, shortness of breath, cough, abdominal pain, diarrhea, constipation, melena, hematochezia, dysuria, urinary frequency, or urgency. Patient denies any other complains at this time.   (18 Feb 2024 01:57)      Pt is admitted for CRPS exacerbation. Pain consulted 2/20. Pt laying in bed, reports chronic right sided body pain from right flank to right ribs and shoulder from CRPS and intercostal neuralgia for which she has a spinal cord stimulator and is on oxycodone 30mg PO 3x/day at home. Pt seen and examined at bedside. Reports she was dx with CRPS in 2016 and had an SCS placed in 3/2023, last checked by representative in 12/2023. Pt reports back pain score 15/10 SCALE USED: (1-10 VNRS). Pt describes pain as constant, pushing, burning, stabbing pain, right flank radiating to right upper back, minimally alleviated by current pain medication, exacerbated by movement and coughing. Reports taking oxycodone 30mg PO 3x/day at home PRN pain which is now prescribed by her PCP. Reports she needs to connect with pain management outpatient. Has had ADELA and nerve ablation in the past with no relief of her pain. Pt tolerating PO diet. Denies lethargy, chest pain, SOB, nausea, vomiting, constipation. Patient stated goal for pain control: to be able to take deep breaths, get out of bed to chair and ambulate with tolerable pain control.     PAST MEDICAL & SURGICAL HISTORY:  Diabetes      HTN (hypertension)      Neuropathy      Obesity      Former cigarette smoker  (smoked x 45 years; quit ~2013)      Marijuana smoker, continuous  (states smokes 3 - 4 times per day for pain management reasons)      Neuralgia  (pt states hx/o "intercostal neuralgia")      Cardiac abnormality  (pt states hx/o "cardiac event"; is unclear on diagnosis; denies hx/o MI)      Diabetes      Essential hypertension      IBS (irritable bowel syndrome)      Complex regional pain syndrome type 1      History of cholecystectomy      History of hand surgery  (pt states she had 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 (18 Feb 2024 01:57)    Allergies    amitriptyline (Other)    MEDICATIONS  (STANDING):  acetaminophen     Tablet .. 1000 milliGRAM(s) Oral every 8 hours  acetaZOLAMIDE    Tablet 250 milliGRAM(s) Oral two times a day  atorvastatin 20 milliGRAM(s) Oral at bedtime  buMETAnide 2 milliGRAM(s) Oral daily  carvedilol 3.125 milliGRAM(s) Oral every 12 hours  DULoxetine 30 milliGRAM(s) Oral every 12 hours  enoxaparin Injectable 40 milliGRAM(s) SubCutaneous every 24 hours  insulin glargine Injectable (LANTUS) 40 Unit(s) SubCutaneous at bedtime  insulin lispro (ADMELOG) corrective regimen sliding scale   SubCutaneous Before meals and at bedtime  insulin lispro Injectable (ADMELOG) 15 Unit(s) SubCutaneous three times a day before meals  lidocaine   4% Patch 3 Patch Transdermal daily  lisinopril 5 milliGRAM(s) Oral daily  methocarbamol 500 milliGRAM(s) Oral three times a day  naloxegol 25 milliGRAM(s) Oral daily  naloxone Injectable 0.4 milliGRAM(s) IV Push once  pantoprazole    Tablet 40 milliGRAM(s) Oral before breakfast  polyethylene glycol 3350 17 Gram(s) Oral daily  pregabalin 150 milliGRAM(s) Oral three times a day  senna 2 Tablet(s) Oral at bedtime  spironolactone 25 milliGRAM(s) Oral daily    MEDICATIONS  (PRN):  bisacodyl 5 milliGRAM(s) Oral daily PRN Constipation  HYDROmorphone   Tablet 4 milliGRAM(s) Oral every 4 hours PRN Severe Pain (7 - 10)  melatonin 3 milliGRAM(s) Oral at bedtime PRN Insomnia  ondansetron Injectable 4 milliGRAM(s) IV Push every 8 hours PRN Nausea and/or Vomiting      Vital Signs Last 24 Hrs  T(C): 36.2 (20 Feb 2024 12:30), Max: 36.7 (19 Feb 2024 20:55)  T(F): 97.1 (20 Feb 2024 12:30), Max: 98.1 (19 Feb 2024 20:55)  HR: 52 (20 Feb 2024 12:30) (52 - 57)  BP: 114/69 (20 Feb 2024 12:30) (113/69 - 150/68)  BP(mean): 95 (20 Feb 2024 05:20) (84 - 95)  RR: 18 (20 Feb 2024 12:30) (18 - 18)  SpO2: 94% (20 Feb 2024 12:30) (94% - 96%)    Parameters below as of 20 Feb 2024 12:30  Patient On (Oxygen Delivery Method): room air        LABS: Reviewed.                          12.5   8.34  )-----------( 466      ( 20 Feb 2024 07:20 )             39.7     02-20    134<L>  |  107  |  15  ----------------------------<  187<H>  4.0   |  23  |  0.98    Ca    8.9      20 Feb 2024 07:20  Phos  4.0     02-19  Mg     2.1     02-19          Urinalysis Basic - ( 20 Feb 2024 07:20 )    Color: x / Appearance: x / SG: x / pH: x  Gluc: 187 mg/dL / Ketone: x  / Bili: x / Urobili: x   Blood: x / Protein: x / Nitrite: x   Leuk Esterase: x / RBC: x / WBC x   Sq Epi: x / Non Sq Epi: x / Bacteria: x      CAPILLARY BLOOD GLUCOSE      POCT Blood Glucose.: 174 mg/dL (20 Feb 2024 11:17)  POCT Blood Glucose.: 180 mg/dL (20 Feb 2024 07:47)  POCT Blood Glucose.: 103 mg/dL (19 Feb 2024 20:54)  POCT Blood Glucose.: 119 mg/dL (19 Feb 2024 16:26)  POCT Blood Glucose.: 129 mg/dL (19 Feb 2024 16:20)    SARS-CoV-2: NotDetec (08 Dec 2023 01:28)      Radiology: Reviewed.   < from: CT Head w/wo IV Cont (12.16.23 @ 11:18) >    ACC: [id number] EXAM:  CT BRAIN WAW IC   ORDERED BY: VIKTORIYA ADAM     PROCEDURE DATE:  12/16/2023          INTERPRETATION:  HISTORY: Status post lumbar puncture. Evaluate for   intracranial hypotension.    TECHNIQUE: A CT scan of the head with and without intravenous contrast   was performed. 90 cc intravenous Omnipaque 350 contrast was administered   without reported complication. 10 cc contrast was discarded.  Axial images were performed from the skull base to the vertex with   coronal/sagittal reconstructions.    COMPARISON: CT head 12/7/2023.    FINDINGS:    No acute intracranial hemorrhage, mass effect, hydrocephalus, or midline   shift.  No extra-axial collection.    Expanded sella turcica with CSF attenuation due to near-complete empty   sella versus underlying arachnoid cyst.    Postcontrast images show normal opacification of the dural venous sinuses   without filling defect or thrombus formation.    Ventricles are normal in size. Basal cisterns are patent. Empty sella is   noted.    Right maxillary polyps versus retention cysts. Mild mucosal thickening of   the bilateral ethmoid air cells. The mastoid air cells are clear.  The   calvarium is intact. Left parafalcine calcified structure measures 1.7 x   0.6 cm, likely a calcified meningioma.    IMPRESSION:    No acute intracranial bleeding.    Expanded sella turcica with CSF attenuation due to near-complete empty   sella versus underlying arachnoid cyst. Findings may be seen in the   setting of intracranial hypertension.    --- End of Report ---          TRACE JACOBO MD; Resident Radiologist  This document has been electronically signed.  ROSIO BAUGH MD; Attending Radiologist  This document has been electronically signed. Dec 16 2023  2:57PM    < end of copied text >      ORT Score -   Family Hx of substance abuse	Female	      Male  Alcohol 	                                           1                     3  Illegal drugs	                                   2                     3  Rx drugs                                           4 	                  4  Personal Hx of substance abuse		  Alcohol 	                                          3	                  3  Illegal drugs                                     4	                  4  Rx drugs                                            5 	                  5  Age between 16- 45 years	           1                     1  hx preadolescent sexual abuse	   3 	                  0  Psychological disease		  ADD, OCD, bipolar, schizophrenia   2	          2  Depression                                           1 	          1  Total: 0    a score of 3 or lower indicates low risk for opioid abuse		  a score of 4-7 indicates moderate risk for opioid abuse		  a score of 8 or higher indicates high risk for opioid abuse    REVIEW OF SYSTEMS:  CONSTITUTIONAL: No fever + fatigue  HEENT:  No difficulty hearing, no change in vision  RESPIRATORY: No cough, wheezing, chills or hemoptysis; No shortness of breath  CARDIOVASCULAR: No chest pain, palpitations, dizziness, or leg swelling  GASTROINTESTINAL: No loss of appetite, decreased PO intake. No abdominal or epigastric pain. No nausea, vomiting; No diarrhea or constipation.   GENITOURINARY: No dysuria, frequency, hematuria, retention or incontinence  MUSCULOSKELETAL: + chronic rib, right flank and right upper back pain; No joint swelling;  + right lower motor strength weakness, no saddle anesthesia, bowel/bladder incontinence, no falls   NEURO: + spinal cord stimulator; + CRPS; + intracostal neuralgia; + burning right lank to shoulder; No headaches, No numbness/tingling b/l LE    PHYSICAL EXAM:  GENERAL:  Alert & Oriented X4, cooperative, NAD, Good concentration. Speech is clear.   RESPIRATORY: Respirations even and unlabored. Clear to auscultation bilaterally; No rales, rhonchi, wheezing, or rubs  CARDIOVASCULAR: Normal S1/S2, regular rate and rhythm; No murmurs, rubs, or gallops. No JVD.   GASTROINTESTINAL:  Soft, Nontender, Nondistended; Bowel sounds present  PERIPHERAL VASCULAR:  Extremities warm without edema. 2+ Peripheral Pulses, No cyanosis, No calf tenderness  MUSCULOSKELETAL: Motor Strength 5/5 B/L upper left lower extremity, 4/5 right lower extremities; moves all extremities equally against gravity; + decreased ROM right lower extremity; = right lumbar back and rib tenderness on palpation   SKIN: Warm, dry, intact. No rashes, lesions, scars or wounds. + right buttock  SCS palpated, site without erythema, edema. Incision healed well approximated. + scar lumbar back well approximated.     Risk factors associated with adverse outcomes related to opioid treatment  [ ]  Concurrent benzodiazepine use  [ ]  History/ Active substance use or alcohol use disorder  [ ] Psychiatric co-morbidity  [ ] Sleep apnea  [ ] COPD  [ ] BMI> 35  [ ] Liver dysfunction  [ ] Renal dysfunction  [ ] CHF  [ ] Smoker  [ ]  Age > 60 years    [X ]  [place]  Reviewed and Copied to Chart. See below.    Plan of care and goal oriented pain management treatment options were discussed with patient and /or primary care giver; all questions and concerns were addressed and care was aligned with patient's wishes.    Educated patient on goal oriented pain management treatment options

## 2024-02-20 NOTE — CONSULT NOTE ADULT - PROBLEM SELECTOR RECOMMENDATION 2
Mild pain (score 1-3)  - Non-pharmacological pain treatment recommendations  - Warm/ Cool packs PRN   - Repositioning, imagery, relaxation, distraction.  - Physical therapy OOB if no contraindications   Recommendations discussed with primary team and RN. *Pt needs to be prescribed narcan rescue kit upon discharge*. Recommend telepain Dr. Gale at [phone] upon discharge.

## 2024-02-20 NOTE — PROGRESS NOTE ADULT - SUBJECTIVE AND OBJECTIVE BOX
NP Note discussed with  primary attending    Patient is a 53y old  Female who presents with a chief complaint of Nausea. (20 Feb 2024 09:22)      INTERVAL HPI/OVERNIGHT EVENTS: no new complaints    MEDICATIONS  (STANDING):  acetaminophen     Tablet .. 1000 milliGRAM(s) Oral every 8 hours  acetaZOLAMIDE    Tablet 250 milliGRAM(s) Oral two times a day  atorvastatin 20 milliGRAM(s) Oral at bedtime  buMETAnide 2 milliGRAM(s) Oral daily  carvedilol 3.125 milliGRAM(s) Oral every 12 hours  DULoxetine 30 milliGRAM(s) Oral every 12 hours  enoxaparin Injectable 40 milliGRAM(s) SubCutaneous every 24 hours  insulin glargine Injectable (LANTUS) 40 Unit(s) SubCutaneous at bedtime  insulin lispro (ADMELOG) corrective regimen sliding scale   SubCutaneous Before meals and at bedtime  insulin lispro Injectable (ADMELOG) 15 Unit(s) SubCutaneous three times a day before meals  lidocaine   4% Patch 3 Patch Transdermal daily  lisinopril 5 milliGRAM(s) Oral daily  methocarbamol 500 milliGRAM(s) Oral three times a day  naloxegol 25 milliGRAM(s) Oral daily  naloxone Injectable 0.4 milliGRAM(s) IV Push once  pantoprazole    Tablet 40 milliGRAM(s) Oral before breakfast  polyethylene glycol 3350 17 Gram(s) Oral daily  pregabalin 150 milliGRAM(s) Oral three times a day  senna 2 Tablet(s) Oral at bedtime  spironolactone 25 milliGRAM(s) Oral daily    MEDICATIONS  (PRN):  bisacodyl 5 milliGRAM(s) Oral daily PRN Constipation  HYDROmorphone   Tablet 4 milliGRAM(s) Oral every 4 hours PRN Severe Pain (7 - 10)  melatonin 3 milliGRAM(s) Oral at bedtime PRN Insomnia  ondansetron Injectable 4 milliGRAM(s) IV Push every 8 hours PRN Nausea and/or Vomiting      __________________________________________________  REVIEW OF SYSTEMS:    CONSTITUTIONAL: No fever,   EYES: no acute visual disturbances  NECK: No pain or stiffness  RESPIRATORY: No cough; No shortness of breath  CARDIOVASCULAR: No chest pain, no palpitations  GASTROINTESTINAL: No pain. No nausea or vomiting; No diarrhea   NEUROLOGICAL: No headache or numbness, no tremors  MUSCULOSKELETAL: No joint pain, no muscle pain  GENITOURINARY: no dysuria, no frequency, no hesitancy  PSYCHIATRY: no depression , no anxiety  ALL OTHER  ROS negative        Vital Signs Last 24 Hrs  T(C): 36.2 (20 Feb 2024 12:30), Max: 36.7 (19 Feb 2024 20:55)  T(F): 97.1 (20 Feb 2024 12:30), Max: 98.1 (19 Feb 2024 20:55)  HR: 52 (20 Feb 2024 12:30) (52 - 57)  BP: 114/69 (20 Feb 2024 12:30) (113/69 - 150/68)  BP(mean): 95 (20 Feb 2024 05:20) (84 - 95)  RR: 18 (20 Feb 2024 12:30) (18 - 18)  SpO2: 94% (20 Feb 2024 12:30) (94% - 96%)    Parameters below as of 20 Feb 2024 12:30  Patient On (Oxygen Delivery Method): room air        ________________________________________________  PHYSICAL EXAM:  GENERAL: NAD  HEENT: Normocephalic;  conjunctivae and sclerae clear; moist mucous membranes;   NECK : supple  CHEST/LUNG: Clear to ausculitation bilaterally with good air entry   HEART: S1 S2  regular; no murmurs, gallops or rubs  ABDOMEN: Soft, Nontender, Nondistended; Bowel sounds present  EXTREMITIES: no cyanosis; no edema; no calf tenderness  SKIN: warm and dry; no rash  NERVOUS SYSTEM:  Awake and alert; Oriented  to place, person and time ; no new deficits    _________________________________________________  LABS:                        12.5   8.34  )-----------( 466      ( 20 Feb 2024 07:20 )             39.7     02-20    134<L>  |  107  |  15  ----------------------------<  187<H>  4.0   |  23  |  0.98    Ca    8.9      20 Feb 2024 07:20  Phos  4.0     02-19  Mg     2.1     02-19        Urinalysis Basic - ( 20 Feb 2024 07:20 )    Color: x / Appearance: x / SG: x / pH: x  Gluc: 187 mg/dL / Ketone: x  / Bili: x / Urobili: x   Blood: x / Protein: x / Nitrite: x   Leuk Esterase: x / RBC: x / WBC x   Sq Epi: x / Non Sq Epi: x / Bacteria: x      CAPILLARY BLOOD GLUCOSE      POCT Blood Glucose.: 106 mg/dL (20 Feb 2024 16:15)  POCT Blood Glucose.: 174 mg/dL (20 Feb 2024 11:17)  POCT Blood Glucose.: 180 mg/dL (20 Feb 2024 07:47)  POCT Blood Glucose.: 103 mg/dL (19 Feb 2024 20:54)        RADIOLOGY & ADDITIONAL TESTS:    Imaging Personally Reviewed:  YES    Consultant(s) Notes Reviewed:   YES    Care Discussed with Consultants :     Plan of care was discussed with patient and /or primary care giver; all questions and concerns were addressed and care was aligned with patient's wishes.

## 2024-02-20 NOTE — CONSULT NOTE ADULT - PROBLEM SELECTOR RECOMMENDATION 9
Pt with chronic right sided body pain from right flank to right ribs and shoulder which is somatic and neuropathic in nature due to CRPS and intercostal neuralgia. Pt with spinal cord stimulator and is opioid dependent. Pt is on oxycodone 30mgPO 3x/day at home.   Opioid pain recommendations   - Discontinue IV dilaudid PRN.  - Dilaudid 0.5mg IV once now.   - Start dilaudid 4mg PO q4 hours PRN severe pain. Monitor for sedation/ respiratory depression.   Non-opioid pain recommendations   - Increase Lyrica to 150mg PO TID. (Reports taking 200mg PO TID in the past).   - Acetaminophen 1 gram PO q 8 hours x 4 days.   - Discontinue PRN baclofen.  - Start Methocarbamol 500mg PO 3x/day x 4 days.   - Increase Lidoderm 4% 3 patches daily.   Bowel Regimen  - Continue Miralax 17G PO daily  - Continue Senna 2 tablets at bedtime for constipation  - Continue dulcolax 5mg PO daily PRN constipation

## 2024-02-20 NOTE — PROGRESS NOTE ADULT - NS ATTEND AMEND GEN_ALL_CORE FT
Patient seen at bedside, states she feels the pain in the R shoulder to back is improving, rating 10 with the scale from 0-15, with less than 10 indicating being able to go home and function independently. Nausea is also in remission.    On exam, patient is AOx3, NAD, cardiopulmonary exams unremarkable, abdomen soft, NT/ND, extremities without edema. Has tenderness to palpation in the R scapula and back.  Labs reviewed, CBC, BMP unremarkable.    Assessment and plan:  52 y/o F with PMH of CHF, HTN, HLD, DM, Idiopathic intracranial hypertension (diagnosed 2020), chronic right sided back pain (intercostal neuralgia/complex regional pain syndrome) s/p spinal stimulator who presented with worsening nausea for 2 days ago. Admitted for intractable pain 2/2 CRPS flare triggered by vomiting/stress.       # Complex regional pain syndrome Flare    # Gastritis w/ vomiting (resolving)   # Pseudotumor cerebri   # T2DM   # Chronic CHF (compensated)   # HTN   # HLD       - Tolerating PO diet well, vomiting is improving   - pain medicine recs appreciated, patient requests referral to tele-pain medicine service for continuation of the pain meds   - c/w PPI for possible gastritis    - FS/SSI   - DVT ppx: lovenox

## 2024-02-20 NOTE — PROGRESS NOTE ADULT - PROBLEM SELECTOR PLAN 4
- Takes metformin, Ozempic, tresceba 54U before bedtime, and novolog insulin 15 TID.   - Hold oral hypoglycemics.   - Start Lantus 40U, and Lispro 15 U TID.  - A1c 7.3

## 2024-02-21 ENCOUNTER — TRANSCRIPTION ENCOUNTER (OUTPATIENT)
Age: 54
End: 2024-02-21

## 2024-02-21 VITALS
DIASTOLIC BLOOD PRESSURE: 77 MMHG | OXYGEN SATURATION: 98 % | SYSTOLIC BLOOD PRESSURE: 135 MMHG | RESPIRATION RATE: 18 BRPM | HEART RATE: 61 BPM | TEMPERATURE: 98 F

## 2024-02-21 LAB
ANION GAP SERPL CALC-SCNC: 5 MMOL/L — SIGNIFICANT CHANGE UP (ref 5–17)
BUN SERPL-MCNC: 14 MG/DL — SIGNIFICANT CHANGE UP (ref 7–18)
CALCIUM SERPL-MCNC: 9.1 MG/DL — SIGNIFICANT CHANGE UP (ref 8.4–10.5)
CHLORIDE SERPL-SCNC: 105 MMOL/L — SIGNIFICANT CHANGE UP (ref 96–108)
CO2 SERPL-SCNC: 25 MMOL/L — SIGNIFICANT CHANGE UP (ref 22–31)
CREAT SERPL-MCNC: 1.03 MG/DL — SIGNIFICANT CHANGE UP (ref 0.5–1.3)
EGFR: 65 ML/MIN/1.73M2 — SIGNIFICANT CHANGE UP
GLUCOSE BLDC GLUCOMTR-MCNC: 125 MG/DL — HIGH (ref 70–99)
GLUCOSE BLDC GLUCOMTR-MCNC: 130 MG/DL — HIGH (ref 70–99)
GLUCOSE BLDC GLUCOMTR-MCNC: 146 MG/DL — HIGH (ref 70–99)
GLUCOSE SERPL-MCNC: 158 MG/DL — HIGH (ref 70–99)
HCT VFR BLD CALC: 38.9 % — SIGNIFICANT CHANGE UP (ref 34.5–45)
HGB BLD-MCNC: 12.4 G/DL — SIGNIFICANT CHANGE UP (ref 11.5–15.5)
MCHC RBC-ENTMCNC: 29.3 PG — SIGNIFICANT CHANGE UP (ref 27–34)
MCHC RBC-ENTMCNC: 31.9 GM/DL — LOW (ref 32–36)
MCV RBC AUTO: 92 FL — SIGNIFICANT CHANGE UP (ref 80–100)
NRBC # BLD: 0 /100 WBCS — SIGNIFICANT CHANGE UP (ref 0–0)
PLATELET # BLD AUTO: 483 K/UL — HIGH (ref 150–400)
POTASSIUM SERPL-MCNC: 3.8 MMOL/L — SIGNIFICANT CHANGE UP (ref 3.5–5.3)
POTASSIUM SERPL-SCNC: 3.8 MMOL/L — SIGNIFICANT CHANGE UP (ref 3.5–5.3)
RBC # BLD: 4.23 M/UL — SIGNIFICANT CHANGE UP (ref 3.8–5.2)
RBC # FLD: 12.4 % — SIGNIFICANT CHANGE UP (ref 10.3–14.5)
SODIUM SERPL-SCNC: 135 MMOL/L — SIGNIFICANT CHANGE UP (ref 135–145)
WBC # BLD: 10.21 K/UL — SIGNIFICANT CHANGE UP (ref 3.8–10.5)
WBC # FLD AUTO: 10.21 K/UL — SIGNIFICANT CHANGE UP (ref 3.8–10.5)

## 2024-02-21 PROCEDURE — 99285 EMERGENCY DEPT VISIT HI MDM: CPT | Mod: 25

## 2024-02-21 PROCEDURE — 96375 TX/PRO/DX INJ NEW DRUG ADDON: CPT

## 2024-02-21 PROCEDURE — 84702 CHORIONIC GONADOTROPIN TEST: CPT

## 2024-02-21 PROCEDURE — 84100 ASSAY OF PHOSPHORUS: CPT

## 2024-02-21 PROCEDURE — 85027 COMPLETE CBC AUTOMATED: CPT

## 2024-02-21 PROCEDURE — 80053 COMPREHEN METABOLIC PANEL: CPT

## 2024-02-21 PROCEDURE — 83735 ASSAY OF MAGNESIUM: CPT

## 2024-02-21 PROCEDURE — 99232 SBSQ HOSP IP/OBS MODERATE 35: CPT

## 2024-02-21 PROCEDURE — 36415 COLL VENOUS BLD VENIPUNCTURE: CPT

## 2024-02-21 PROCEDURE — 99239 HOSP IP/OBS DSCHRG MGMT >30: CPT

## 2024-02-21 PROCEDURE — 80048 BASIC METABOLIC PNL TOTAL CA: CPT

## 2024-02-21 PROCEDURE — 82962 GLUCOSE BLOOD TEST: CPT

## 2024-02-21 PROCEDURE — 83036 HEMOGLOBIN GLYCOSYLATED A1C: CPT

## 2024-02-21 PROCEDURE — 93005 ELECTROCARDIOGRAM TRACING: CPT

## 2024-02-21 PROCEDURE — 96374 THER/PROPH/DIAG INJ IV PUSH: CPT

## 2024-02-21 RX ORDER — HYDROMORPHONE HYDROCHLORIDE 2 MG/ML
0.5 INJECTION INTRAMUSCULAR; INTRAVENOUS; SUBCUTANEOUS ONCE
Refills: 0 | Status: DISCONTINUED | OUTPATIENT
Start: 2024-02-21 | End: 2024-02-21

## 2024-02-21 RX ORDER — NALOXONE HYDROCHLORIDE 4 MG/.1ML
4 SPRAY NASAL
Qty: 2 | Refills: 0
Start: 2024-02-21 | End: 2024-02-22

## 2024-02-21 RX ADMIN — Medication 200 MILLIGRAM(S): at 13:22

## 2024-02-21 RX ADMIN — HYDROMORPHONE HYDROCHLORIDE 4 MILLIGRAM(S): 2 INJECTION INTRAMUSCULAR; INTRAVENOUS; SUBCUTANEOUS at 04:00

## 2024-02-21 RX ADMIN — DULOXETINE HYDROCHLORIDE 30 MILLIGRAM(S): 30 CAPSULE, DELAYED RELEASE ORAL at 06:03

## 2024-02-21 RX ADMIN — ACETAZOLAMIDE 250 MILLIGRAM(S): 250 TABLET ORAL at 19:15

## 2024-02-21 RX ADMIN — SPIRONOLACTONE 25 MILLIGRAM(S): 25 TABLET, FILM COATED ORAL at 06:05

## 2024-02-21 RX ADMIN — Medication 15 UNIT(S): at 12:11

## 2024-02-21 RX ADMIN — HYDROMORPHONE HYDROCHLORIDE 4 MILLIGRAM(S): 2 INJECTION INTRAMUSCULAR; INTRAVENOUS; SUBCUTANEOUS at 08:30

## 2024-02-21 RX ADMIN — LIDOCAINE 3 PATCH: 4 CREAM TOPICAL at 13:20

## 2024-02-21 RX ADMIN — Medication 1000 MILLIGRAM(S): at 06:02

## 2024-02-21 RX ADMIN — POLYETHYLENE GLYCOL 3350 17 GRAM(S): 17 POWDER, FOR SOLUTION ORAL at 13:21

## 2024-02-21 RX ADMIN — CARVEDILOL PHOSPHATE 3.12 MILLIGRAM(S): 80 CAPSULE, EXTENDED RELEASE ORAL at 19:15

## 2024-02-21 RX ADMIN — HYDROMORPHONE HYDROCHLORIDE 0.5 MILLIGRAM(S): 2 INJECTION INTRAMUSCULAR; INTRAVENOUS; SUBCUTANEOUS at 16:17

## 2024-02-21 RX ADMIN — LISINOPRIL 5 MILLIGRAM(S): 2.5 TABLET ORAL at 06:04

## 2024-02-21 RX ADMIN — NALOXEGOL OXALATE 25 MILLIGRAM(S): 12.5 TABLET, FILM COATED ORAL at 13:22

## 2024-02-21 RX ADMIN — METHOCARBAMOL 500 MILLIGRAM(S): 500 TABLET, FILM COATED ORAL at 06:04

## 2024-02-21 RX ADMIN — HYDROMORPHONE HYDROCHLORIDE 4 MILLIGRAM(S): 2 INJECTION INTRAMUSCULAR; INTRAVENOUS; SUBCUTANEOUS at 09:30

## 2024-02-21 RX ADMIN — DULOXETINE HYDROCHLORIDE 30 MILLIGRAM(S): 30 CAPSULE, DELAYED RELEASE ORAL at 19:14

## 2024-02-21 RX ADMIN — HYDROMORPHONE HYDROCHLORIDE 4 MILLIGRAM(S): 2 INJECTION INTRAMUSCULAR; INTRAVENOUS; SUBCUTANEOUS at 20:12

## 2024-02-21 RX ADMIN — HYDROMORPHONE HYDROCHLORIDE 4 MILLIGRAM(S): 2 INJECTION INTRAMUSCULAR; INTRAVENOUS; SUBCUTANEOUS at 03:53

## 2024-02-21 RX ADMIN — HYDROMORPHONE HYDROCHLORIDE 0.5 MILLIGRAM(S): 2 INJECTION INTRAMUSCULAR; INTRAVENOUS; SUBCUTANEOUS at 14:18

## 2024-02-21 RX ADMIN — ACETAZOLAMIDE 250 MILLIGRAM(S): 250 TABLET ORAL at 06:02

## 2024-02-21 RX ADMIN — METHOCARBAMOL 500 MILLIGRAM(S): 500 TABLET, FILM COATED ORAL at 13:22

## 2024-02-21 RX ADMIN — Medication 1000 MILLIGRAM(S): at 14:21

## 2024-02-21 RX ADMIN — Medication 150 MILLIGRAM(S): at 06:05

## 2024-02-21 RX ADMIN — PANTOPRAZOLE SODIUM 40 MILLIGRAM(S): 20 TABLET, DELAYED RELEASE ORAL at 06:04

## 2024-02-21 RX ADMIN — BUMETANIDE 2 MILLIGRAM(S): 0.25 INJECTION INTRAMUSCULAR; INTRAVENOUS at 06:03

## 2024-02-21 RX ADMIN — Medication 1000 MILLIGRAM(S): at 13:21

## 2024-02-21 RX ADMIN — ENOXAPARIN SODIUM 40 MILLIGRAM(S): 100 INJECTION SUBCUTANEOUS at 06:03

## 2024-02-21 RX ADMIN — HYDROMORPHONE HYDROCHLORIDE 0.5 MILLIGRAM(S): 2 INJECTION INTRAMUSCULAR; INTRAVENOUS; SUBCUTANEOUS at 13:18

## 2024-02-21 NOTE — PROGRESS NOTE ADULT - PROBLEM SELECTOR PLAN 1
Pt with chronic right sided body pain from right flank to right ribs and shoulder which is somatic and neuropathic in nature due to CRPS and intercostal neuralgia. Pt with spinal cord stimulator and is opioid dependent. Pt is on oxycodone 30mgPO 3x/day at home.   Opioid pain recommendations   - Dilaudid 0.5mg IV once now.   - Continue dilaudid 4mg PO q4 hours PRN severe pain. Monitor for sedation/ respiratory depression.   Non-opioid pain recommendations   - Increase Lyrica to 200mg PO TID.  - Acetaminophen 1 gram PO q 8 hours x 4 days.   - Discontinued PRN baclofen 2/20.  - Continue Methocarbamol 500mg PO 3x/day x 4 days.   - Continue Lidoderm 4% 3 patches daily.   Bowel Regimen  - Continue Miralax 17G PO daily  - Continue Senna 2 tablets at bedtime for constipation  - Continue dulcolax 5mg PO daily PRN constipation.
- P/w intractable nausea and vomiting. Inability to tolerate PO.   - Likely in the setting of severe pain.  - Advance diet as tolerated  - IV zofran. (EKG without QT prolongation).   - Pain manegement consulted

## 2024-02-21 NOTE — DISCHARGE NOTE PROVIDER - HOSPITAL COURSE
Patient is a 52 y/o F with PMH of CHF, HTN, HLD, DM, Idiopathic intracranial hypertension (diagnosed 2020), chronic right sided back pain (intercostal neuralgia/complex regional pain syndrome) s/p spinal stimulator who presented worsening nausea since 2 days ago. Patient is being admitted for intractable nausea, and vomiting likely in setting of severe pain from complex regional pain syndrome.   Patient treated with antiemetics. Started on CLD, &  advanced as tolerated  Patient has history of right sided complex regional pain syndrome. Presents with worsening pain not responding to oral oxycodone.   Pain management consulted for pain regimen.   Patient tolerated regular diet well with improved symptoms.   Patient to follow up with out/patient Telepain with Dr. Gale @ [phone]    Given patient's improved clinical status and current hemodynamic stability, decision was made to discharge.  Please refer to patient's complete medical chart with documents for a full hospital course, for this is only a brief summary.       Patient is a 52 y/o F with PMH of CHF, HTN, HLD, DM, Idiopathic intracranial hypertension (diagnosed 2020), chronic right sided back pain (intercostal neuralgia/complex regional pain syndrome) s/p spinal stimulator who presented worsening nausea and abdomina pain and R back pain for 2 days. Patient is being admitted for intractable nausea, and vomiting likely in setting of severe pain from complex regional pain syndrome. Patient treated with antiemetics. Started on CLD, &  advanced as tolerated. Patient has history of right sided complex regional pain syndrome. Pain management consulted for pain regimen, regimen adjusted, now pain improved. Patient tolerated regular diet well with improved symptoms.   Patient is now deemed stable for discharge, to follow up with out/patient Telepain with Dr. Gale @ [phone]

## 2024-02-21 NOTE — DISCHARGE NOTE NURSING/CASE MANAGEMENT/SOCIAL WORK - NSDCPEFALRISK_GEN_ALL_CORE
For information on Fall & Injury Prevention, visit: [email or web address] OR  [email or web address] OR  https://www.cdc.gov/steadi/patient.html

## 2024-02-21 NOTE — DISCHARGE NOTE PROVIDER - ATTENDING DISCHARGE PHYSICAL EXAMINATION:
PHYSICAL EXAM:  GENERAL: NAD  HEAD:  Atraumatic, Normocephalic  EYES: conjunctiva and sclera clear  NECK: Supple, No JVD  CHEST/LUNG: Clear to auscultation bilaterally; No wheeze  HEART: Regular rate and rhythm; No murmurs, rubs, or gallops  ABDOMEN: Soft, Nontender, Nondistended; Bowel sounds present  EXTREMITIES:  No clubbing, cyanosis, or edema. Has R shoulder and back tenderness to palpation  PSYCH: AAOx3  NEUROLOGY: non-focal  SKIN: No rashes or lesions

## 2024-02-21 NOTE — PROGRESS NOTE ADULT - ASSESSMENT
52 y/o F with PMH of CHF, HTN, HLD, DM, Idiopathic intracranial hypertension (diagnosed 2020), chronic right sided back pain (intercostal neuralgia/complex regional pain syndrome) s/p spinal stimulator who presented with worsening nausea since 2 days ago. Admitted for intractable pain 2/2 CRPS flare triggered by vomiting/ stress.    ASSESSMENT & PLAN   #Complex regional pain syndrome Flare   # Gastritis w/ vomiting (resolving)  # Pseudotumor cerebri  # T2DM  # Chronic CHF (compensated)  # HTN  # HLD    Tolerating PO diet well, vomiting is much better   C/w PPI for possible gastritis   No headaches, less likely caused by inc ICP   BP and glucose POCT wnl   She is reporting worsening pain while on prior regimen from pain clinic in [place]; discussed non pharmacological interventions in addition to current regimen   F/u Pain management consult    
Confidential Drug Utilization Report  Search Terms: Fauzia Linares, 1970Search Date: 02/20/2024 09:22:53 AM  The Drug Utilization Report below displays all of the controlled substance prescriptions, if any, that your patient has filled in the last twelve months. The information displayed on this report is compiled from pharmacy submissions to the Department, and accurately reflects the information as submitted by the pharmacies.    This report was requested by: Marianela Stark | Reference #: [id number]    You have not added a CROW number. Keeping your CROW number(s) up to date on the My CROW # tab will enable the separation of your prescriptions from others in the search results.    Practitioner Count: 3  Pharmacy Count: 3  Current Opioid Prescriptions: 1  Current Benzodiazepine Prescriptions: 0  Current Stimulant Prescriptions: 0      Patient Demographic Information (PDI)       PDI	First Name	Last Name	Birth Date	Gender	Street Address	[facility]  A	Fauzia Linares	1970	Female	[id number] [facility]	[id number]  B	Fauzia Linares	1970	Female	[address]	[facility]	[place]	[id number]  C	Fauzia Linares	1970	Female	[address]	[facility]	[place]	[address]  D	Fauzia Linares	1970	Female	[address]	[facility]	[place]	[address]  E	Fauzia Linares	1970	Female	[address] [facility]	[id number]    Prescription Information      PDI Filter:    PDI	Current Rx	Drug Type	Rx Written	Rx Dispensed	Drug	Quantity	Days Supply	Prescriber Name	Prescriber CROW #	Payment Method	Dispenser  A	N		10/06/2023	10/07/2023	pregabalin 100 mg capsule	90	30	Tc, Reba	[id number]	Medicare	[facility]  A	N	O	09/28/2023	10/01/2023	oxycodone hcl (ir) 15 mg tab	30	5	Tc, Reba	[id number]	Medicare	[facility]  A	N	O	09/27/2023	09/27/2023	oxycodone hcl (ir) 15 mg tab	30	8	Tc, Reba	[id number]	Medicare	[facility]  A	N	O	09/10/2023	09/10/2023	oxycodone hcl (ir) 15 mg tab	30	5	Tc, Reba	[id number]	Medicare	[facility]  A	N		09/06/2023	09/06/2023	pregabalin 100 mg capsule	90	30	Tc, Reba	[id number]	Medicare	[facility]  A	N	O	08/27/2023	08/28/2023	oxycodone hcl (ir) 15 mg tab	30	5	Tc, Reba	[id number]	Medicare	[facility]  A	N	O	08/12/2023	08/13/2023	oxycodone hcl (ir) 15 mg tab	30	5	Tc, Reba	[id number]	Medicare	[facility]  A	N		08/04/2023	08/05/2023	pregabalin 100 mg capsule	90	30	Tc, Reba	[id number]	Medicare	[facility]  A	N		07/21/2023	07/22/2023	pregabalin 100 mg capsule	42	14	Tc, Reba	[id number]	Medicare	[facility]  A	N	O	07/16/2023	07/16/2023	oxycodone hcl (ir) 15 mg tab	30	7	Tc, Reba	[id number]	Medicare	[facility]  A	N	O	06/30/2023	07/01/2023	oxycodone hcl (ir) 15 mg tab	42	10	Tc, Reba	[id number]	Medicare	[facility]  A	N		06/19/2023	06/20/2023	pregabalin 100 mg capsule	90	30	Tc, Reba	[id number]	Medicare	[facility]  A	N	O	06/06/2023	06/07/2023	oxycodone hcl (ir) 15 mg tab	42	7	Tc, Reba	[id number]	Medicare	[facility]  A	N	O	05/22/2023	05/23/2023	oxycodone hcl (ir) 15 mg tab	42	10	Tc, Reba	[id number]	Medicare	[facility]  A	N		05/19/2023	05/20/2023	pregabalin 100 mg capsule	90	30	Tc, Reba	[id number]	Medicare	[facility]  A	N	O	04/22/2023	04/23/2023	oxycodone hcl (ir) 15 mg tab	90	15	Tc, Reba	[id number]	Medicare	[facility]  A	N		04/17/2023	04/18/2023	pregabalin 100 mg capsule	90	30	Naomi Ybarra	[id number]	Medicare	[facility]  A	N	O	03/30/2023	03/31/2023	oxycodone hcl (ir) 15 mg tab	60	15	Tc, Reba	[id number]	Medicare	[facility]  A	N	O	03/21/2023	03/21/2023	oxycodone hcl (ir) 15 mg tab	28	7	[facility]	[id number]	Medicare	[facility]  A	N		03/19/2023	03/19/2023	pregabalin 100 mg capsule	90	30	Tc, Reba	[id number]	Medicare	[facility]  A	N	O	03/02/2023	03/03/2023	oxycodone hcl (ir) 15 mg tab	56	10	Tc, Reba	[id number]	Medicare	[facility]  B	N	O	11/28/2023	11/30/2023	oxycodone hcl (ir) 20 mg tab	60	30	Vinny Maldonado MD	[id number]	Insurance	[id number]  B	N	O	11/16/2023	11/18/2023	oxycodone-acetaminophen  mg tab	30	15	Vinny Maldonado MD	[id number]	Insurance	[id number]  B	N		11/16/2023	11/18/2023	pregabalin 100 mg capsule	90	30	Vinny Maldonado MD	[id number]	Insurance	[id number]  B	N		09/28/2023	11/01/2023	pregabalin 100 mg capsule	30	10	Tc, Reba	[id number]	Insurance	[id number]  C	N	O	12/29/2023	12/29/2023	oxycodone hcl (ir) 15 mg tab	12	3	Christopher Montiel	[id number]	Insurance	[facility]	N		12/29/2023	12/29/2023	butalbital-acetaminophen-caffeine -40 mg tablet	53	8	Christopher Montiel	[id number]	Insurance	[facility]	N	O	10/12/2023	10/14/2023	oxycodone hcl (ir) 15 mg tab	28	7	Tate, Naomi	[id number]	[id number]  D	N		02/08/2024	02/09/2024	pregabalin 100 mg capsule	42	7	Alba Jasso M	[id number]	[facility]  D	N	O	02/08/2024	02/09/2024	hydromorphone 4 mg tablet	20	5	Alba Jasso M	[id number]	[facility]  D	Y		01/25/2024	01/26/2024	pregabalin 100 mg capsule	90	30	Vinny Maldonado MD	[id number]	[facility]  D	Y	O	01/25/2024	01/26/2024	oxycodone hcl (ir) 20 mg tab	60	30	Vinny Maldonado MD	[id number]	[facility]  E	N		09/28/2023	10/02/2023	pregabalin 100 mg capsule	30	10	Reba Montez	[id number]	[id number]    * - Details of Drug Type : O = Opioid, B = Benzodiazepine, S = Stimulant    * - Drugs marked with an asterisk are compound drugs. If the compound drug is made up of more than one controlled substance, then each controlled substance will be a separate row in the table.       
Patient is a 54 y/o F with PMH of CHF, HTN, HLD, DM, Idiopathic intracranial hypertension (diagnosed 2020), chronic right sided back pain (intercostal neuralgia/complex regional pain syndrome) s/p spinal stimulator who presented worsening nausea since 2 days ago. Patient is being admitted for intractable nausea, and vomiting likely in setting of severe pain from complex regional pain syndrome.

## 2024-02-21 NOTE — DISCHARGE NOTE PROVIDER - NSDCFUSCHEDAPPT_GEN_ALL_CORE_FT
Jud Gale  [facility]  PAINM[facility] [address]  Scheduled Appointment: 02/29/2024

## 2024-02-21 NOTE — PROGRESS NOTE ADULT - SUBJECTIVE AND OBJECTIVE BOX
Source of information: ROBIN LÓPEZ, Chart review  Patient language: English  : n/a    HPI:  Patient is a 52 y/o F with PMH of CHF, HTN, HLD, DM, Idiopathic intracranial hypertension (diagnosed 2020), chronic right sided back pain (intercostal neuralgia/complex regional pain syndrome) s/p spinal stimulator who presented worsening nausea since 2 days ago. Patient reports that 2 days ago she started having worsening right back pain from shoulder blade to right flank associated with severe nausea and vomiting. Patient reports due to the severe nausea/vomiting she was not able to tolerate diet and has not taken anything per oral. Patient reports she is not able to tolerate even 1 sip of water and due to this has not been able to take any of her medications. Patient reports due to this she has not been able to take her oxycodone which has worsened her back pain and nausea/vomiting. Patient denies any associated fevers, chills, diarrhea, or constipation. Patient also denies any associated headache. Patient reports the pain is the same a her prior episodes and the only reason she came to the hospital was because she is not able to tolerate oral pain medications. Patient denies any recent weakness, fatigue, malaise, headache, dizziness, lightheadedness, chest pain, palpitations, shortness of breath, cough, abdominal pain, diarrhea, constipation, melena, hematochezia, dysuria, urinary frequency, or urgency. Patient denies any other complains at this time.   (18 Feb 2024 01:57)    Pt is admitted for CRPS exacerbation. Pain consulted 2/20. Pt laying in bed, reports chronic right sided body pain from right flank to right ribs and shoulder from CRPS and intercostal neuralgia for which she has a spinal cord stimulator and is on oxycodone 30mg PO 3x/day at home.  Reports she was dx with CRPS in 2016 and had an SCS placed in 3/2023, last checked by representative in 12/2023.  Pt seen and examined at bedside. Pt reports back pain score 10/10, minimally improved compared to yesterday SCALE USED: (1-10 VNRS). Pt describes pain as constant, pushing, burning, stabbing pain, right flank radiating to right upper back, minimally alleviated by current pain medication, exacerbated by movement and coughing. Reports taking oxycodone 30mg PO 3x/day at home PRN pain which is now prescribed by her PCP. Reports she needs to connect with pain management outpatient. Has had ADELA and nerve ablation in the past with no relief of her pain. Pt tolerating PO diet. Denies lethargy, chest pain, SOB, nausea, vomiting, constipation. Last BM 2/20. Patient stated goal for pain control: to be able to take deep breaths, get out of bed to chair and ambulate with tolerable pain control. Reports ambulating to the bathroom PRN with increased pain.     PAST MEDICAL & SURGICAL HISTORY:  Diabetes      HTN (hypertension)      Neuropathy      Obesity      Former cigarette smoker  (smoked x 45 years; quit ~2013)      Marijuana smoker, continuous  (states smokes 3 - 4 times per day for pain management reasons)      Neuralgia  (pt states hx/o "intercostal neuralgia")      Cardiac abnormality  (pt states hx/o "cardiac event"; is unclear on diagnosis; denies hx/o MI)      Diabetes      Essential hypertension      IBS (irritable bowel syndrome)      Complex regional pain syndrome type 1      History of cholecystectomy      History of hand surgery  (pt states she had 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 (18 Feb 2024 01:57)    Allergies    amitriptyline (Other)    MEDICATIONS  (STANDING):  acetaminophen     Tablet .. 1000 milliGRAM(s) Oral every 8 hours  acetaZOLAMIDE    Tablet 250 milliGRAM(s) Oral two times a day  atorvastatin 20 milliGRAM(s) Oral at bedtime  buMETAnide 2 milliGRAM(s) Oral daily  carvedilol 3.125 milliGRAM(s) Oral every 12 hours  DULoxetine 30 milliGRAM(s) Oral every 12 hours  enoxaparin Injectable 40 milliGRAM(s) SubCutaneous every 24 hours  HYDROmorphone  Injectable 0.5 milliGRAM(s) IV Push once  insulin glargine Injectable (LANTUS) 40 Unit(s) SubCutaneous at bedtime  insulin lispro (ADMELOG) corrective regimen sliding scale   SubCutaneous Before meals and at bedtime  insulin lispro Injectable (ADMELOG) 15 Unit(s) SubCutaneous three times a day before meals  lidocaine   4% Patch 3 Patch Transdermal daily  lisinopril 5 milliGRAM(s) Oral daily  methocarbamol 500 milliGRAM(s) Oral three times a day  naloxegol 25 milliGRAM(s) Oral daily  naloxone Injectable 0.4 milliGRAM(s) IV Push once  pantoprazole    Tablet 40 milliGRAM(s) Oral before breakfast  polyethylene glycol 3350 17 Gram(s) Oral daily  pregabalin 200 milliGRAM(s) Oral every 8 hours  senna 2 Tablet(s) Oral at bedtime  spironolactone 25 milliGRAM(s) Oral daily    MEDICATIONS  (PRN):  bisacodyl 5 milliGRAM(s) Oral daily PRN Constipation  HYDROmorphone   Tablet 4 milliGRAM(s) Oral every 4 hours PRN Severe Pain (7 - 10)  melatonin 3 milliGRAM(s) Oral at bedtime PRN Insomnia  ondansetron Injectable 4 milliGRAM(s) IV Push every 8 hours PRN Nausea and/or Vomiting      Vital Signs Last 24 Hrs  T(C): 36.8 (21 Feb 2024 05:20), Max: 36.8 (21 Feb 2024 05:20)  T(F): 98.2 (21 Feb 2024 05:20), Max: 98.2 (21 Feb 2024 05:20)  HR: 52 (21 Feb 2024 05:20) (52 - 81)  BP: 124/72 (21 Feb 2024 05:20) (111/70 - 143/61)  BP(mean): --  RR: 17 (21 Feb 2024 05:20) (17 - 18)  SpO2: 95% (21 Feb 2024 05:20) (94% - 97%)    Parameters below as of 21 Feb 2024 05:20  Patient On (Oxygen Delivery Method): room air      SARS-CoV-2: Souravtec (08 Dec 2023 01:28)    LABS: Reviewed                          12.4   10.21 )-----------( 483      ( 21 Feb 2024 07:20 )             38.9     02-21    135  |  105  |  14  ----------------------------<  158<H>  3.8   |  25  |  1.03    Ca    9.1      21 Feb 2024 07:20          Urinalysis Basic - ( 21 Feb 2024 07:20 )    Color: x / Appearance: x / SG: x / pH: x  Gluc: 158 mg/dL / Ketone: x  / Bili: x / Urobili: x   Blood: x / Protein: x / Nitrite: x   Leuk Esterase: x / RBC: x / WBC x   Sq Epi: x / Non Sq Epi: x / Bacteria: x      CAPILLARY BLOOD GLUCOSE      POCT Blood Glucose.: 130 mg/dL (21 Feb 2024 07:38)  POCT Blood Glucose.: 130 mg/dL (20 Feb 2024 21:00)  POCT Blood Glucose.: 106 mg/dL (20 Feb 2024 16:15)    SARS-CoV-2: NotDetec (08 Dec 2023 01:28)    Radiology: Reviewed.   < from: CT Head w/wo IV Cont (12.16.23 @ 11:18) >    ACC: [id number] EXAM:  CT BRAIN WAW IC   ORDERED BY: VIKTORIYA ADAM     PROCEDURE DATE:  12/16/2023          INTERPRETATION:  HISTORY: Status post lumbar puncture. Evaluate for   intracranial hypotension.    TECHNIQUE: A CT scan of the head with and without intravenous contrast   was performed. 90 cc intravenous Omnipaque 350 contrast was administered   without reported complication. 10 cc contrast was discarded.  Axial images were performed from the skull base to the vertex with   coronal/sagittal reconstructions.    COMPARISON: CT head 12/7/2023.    FINDINGS:    No acute intracranial hemorrhage, mass effect, hydrocephalus, or midline   shift.  No extra-axial collection.    Expanded sella turcica with CSF attenuation due to near-complete empty   sella versus underlying arachnoid cyst.    Postcontrast images show normal opacification of the dural venous sinuses   without filling defect or thrombus formation.    Ventricles are normal in size. Basal cisterns are patent. Empty sella is   noted.    Right maxillary polyps versus retention cysts. Mild mucosal thickening of   the bilateral ethmoid air cells. The mastoid air cells are clear.  The   calvarium is intact. Left parafalcine calcified structure measures 1.7 x   0.6 cm, likely a calcified meningioma.    IMPRESSION:    No acute intracranial bleeding.    Expanded sella turcica with CSF attenuation due to near-complete empty   sella versus underlying arachnoid cyst. Findings may be seen in the   setting of intracranial hypertension.    --- End of Report ---          TRACE JACOBO MD; Resident Radiologist  This document has been electronically signed.  ROSIO BAUGH MD; Attending Radiologist  This document has been electronically signed. Dec 16 2023  2:57PM    < end of copied text >      ORT Score -   Family Hx of substance abuse	Female	      Male  Alcohol 	                                           1                     3  Illegal drugs	                                   2                     3  Rx drugs                                           4 	                  4  Personal Hx of substance abuse		  Alcohol 	                                          3	                  3  Illegal drugs                                     4	                  4  Rx drugs                                            5 	                  5  Age between 16- 45 years	           1                     1  hx preadolescent sexual abuse	   3 	                  0  Psychological disease		  ADD, OCD, bipolar, schizophrenia   2	          2  Depression                                           1 	          1  Total: 0    a score of 3 or lower indicates low risk for opioid abuse		  a score of 4-7 indicates moderate risk for opioid abuse		  a score of 8 or higher indicates high risk for opioid abuse    REVIEW OF SYSTEMS:  CONSTITUTIONAL: No fever + fatigue  HEENT:  No difficulty hearing, no change in vision  RESPIRATORY: No cough, wheezing, chills or hemoptysis; No shortness of breath  CARDIOVASCULAR: No chest pain, palpitations, dizziness, or leg swelling  GASTROINTESTINAL: No loss of appetite, decreased PO intake. No abdominal or epigastric pain. No nausea, vomiting; No diarrhea or constipation.   GENITOURINARY: No dysuria, frequency, hematuria, retention or incontinence  MUSCULOSKELETAL: + chronic rib, right flank and right upper back pain; No joint swelling;  + right lower motor strength weakness, no saddle anesthesia, bowel/bladder incontinence, no falls   NEURO: + spinal cord stimulator; + CRPS; + intracostal neuralgia; + burning right lank to shoulder; No headaches, No numbness/tingling b/l LE    PHYSICAL EXAM:  GENERAL:  Alert & Oriented X4, cooperative, NAD, Good concentration. Speech is clear.   RESPIRATORY: Respirations even and unlabored. Clear to auscultation bilaterally; No rales, rhonchi, wheezing, or rubs  CARDIOVASCULAR: Normal S1/S2, regular rate and rhythm; No murmurs, rubs, or gallops. No JVD.   GASTROINTESTINAL:  Soft, Nontender, Nondistended; Bowel sounds present  PERIPHERAL VASCULAR:  Extremities warm without edema. 2+ Peripheral Pulses, No cyanosis, No calf tenderness  MUSCULOSKELETAL: Motor Strength 5/5 B/L upper left lower extremity, 4/5 right lower extremities; moves all extremities equally against gravity; + decreased ROM right lower extremity; = right lumbar back and rib tenderness on palpation   SKIN: Warm, dry, intact. No rashes, lesions, scars or wounds. + right buttock  SCS palpated, site without erythema, edema. Incision healed well approximated. + scar lumbar back well approximated.     Risk factors associated with adverse outcomes related to opioid treatment  [ ]  Concurrent benzodiazepine use  [ ]  History/ Active substance use or alcohol use disorder  [ ] Psychiatric co-morbidity  [ ] Sleep apnea  [ ] COPD  [ ] BMI> 35  [ ] Liver dysfunction  [ ] Renal dysfunction  [ ] CHF  [ ] Smoker  [ ]  Age > 60 years    [X ]  [place]  Reviewed and Copied to Chart. See below.    Plan of care and goal oriented pain management treatment options were discussed with patient and /or primary care giver; all questions and concerns were addressed and care was aligned with patient's wishes.    Educated patient on goal oriented pain management treatment options     02-21-24 @ 11:11

## 2024-02-21 NOTE — DISCHARGE NOTE NURSING/CASE MANAGEMENT/SOCIAL WORK - NSDCVIVACCINE_GEN_ALL_CORE_FT
influenza, injectable, quadrivalent, preservative free; 04-Oct-2022 15:17; Elissa Cano (RN); [employer]; [id number] (Exp. Date: 30-Jun-2023); IntraMuscular; Deltoid Left.; 0.5 milliLiter(s); VIS (VIS Published: 06-Aug-2021, VIS Presented: 04-Oct-2022);

## 2024-02-21 NOTE — DISCHARGE NOTE PROVIDER - NSDCCPCAREPLAN_GEN_ALL_CORE_FT
PRINCIPAL DISCHARGE DIAGNOSIS  Diagnosis: Intractable nausea and vomiting  Assessment and Plan of Treatment: - You presented with intractable nausea & vomitting likely in setting of your history of  complex regional pain syndrome.  - You were treated with Zofran for your nausea  - You were started with oral food, & slowly advanced your diet  - Follow up with your primary doctor after discharge      SECONDARY DISCHARGE DIAGNOSES  Diagnosis: Complex regional pain syndrome I, unspecified  Assessment and Plan of Treatment: - You have a history of Complex regional pain syndrome, which you have a spinal stimulator  - You also presented with pain  - You were evaluated by the pain management team, & your pain regimen was adjusted.  - You are recommended to follow up with Telepain doctor Dr. Gale  @ [phone] after discharge.    Diagnosis: Diabetes mellitus  Assessment and Plan of Treatment: HgA1C this admission 7.3  Make sure you get your HgA1c checked every three months.  If you take oral diabetes medications, check your blood glucose two times a day.  If you take insulin, check your blood glucose before meals and at bedtime.  It's important not to skip any meals.  Keep a log of your blood glucose results and always take it with you to your doctor appointments.  Keep a list of your current medications including injectables and over the counter medications and bring this medication list with you to all your doctor appointments.  If you have not seen your ophthalmologist this year call for appointment.  Check your feet daily for redness, sores, or openings. Do not self treat. If no improvement in two days call your primary care physician for an appointment.  Low blood sugar (hypoglycemia) is a blood sugar below 70mg/dl. Check your blood sugar if you feel signs/symptoms of hypoglycemia. If your blood sugar is below 70 take 15 grams of carbohydrates (ex 4 oz of apple juice, 3-4 glucose tablets, or 4-6 oz of regular soda) wait 15 minutes and repeat blood sugar to make sure it comes up above 70.  If your blood sugar is above 70 and you are due for a meal, have a meal.  If you are not due for a meal have a snack.  This snack helps keeps your blood sugar at a safe range.      Diagnosis: Chronic CHF  Assessment and Plan of Treatment: - You have a history of CHF  - Continue to take your home meds as prescribed  - Weigh yourself daily.  - If you gain 3lbs in 3 days, or 5lbs in a week call your Health Care Provider.  - Do not eat or drink foods containing more than 2000mg of salt (sodium) in your diet every day.  - Call your Health Care Provider if you have any swelling or increased swelling in your feet, ankles, and/or stomach.  - Take all of your medication as directed.  If you become dizzy call your Health Care Provider.      Diagnosis: Hypertension  Assessment and Plan of Treatment: - Continue to take your blood pressure medications as prescribed  - Maintain a Low salt diet  - Engage in activity as tolerated.  - Take all medication as prescribed.  - Follow up with your medical doctor for routine blood pressure monitoring at your next visit.  Notify your doctor if you have any of the following symptoms:   Dizziness, Lightheadedness, Blurry vision, Headache, Chest pain, Shortness of breath      Diagnosis: Hyperlipidemia  Assessment and Plan of Treatment: - Continue to take your meds as prescribed

## 2024-02-21 NOTE — DISCHARGE NOTE PROVIDER - NSDCMRMEDTOKEN_GEN_ALL_CORE_FT
acetaZOLAMIDE 250 mg oral tablet: 2 tab(s) orally 2 times a day  atorvastatin 20 mg oral tablet: 1 tab(s) orally once a day (at bedtime)  baclofen 5 mg oral tablet: 1 tab(s) orally every 8 hours, As needed, Muscle Spasm  bumetanide 2 mg oral tablet: 1 tab(s) orally once a day  butalbital/acetaminophen/caffeine 50 mg-325 mg-40 mg oral tablet: 2 tab(s) orally every 8 hours as needed for migraine  carvedilol 3.125 mg oral tablet: 1 tab(s) orally every 12 hours  diphenhydrAMINE 50 mg oral capsule: 1 cap(s) orally every 8 hours as needed for headache  DULoxetine 30 mg oral delayed release capsule: 1 cap(s) orally 2 times a day  ergocalciferol 1.25 mg (50,000 intl units) oral capsule: 1 cap(s) orally every 7 days take on same day weekly  ketorolac 10 mg oral tablet: 1 tab(s) orally every 8 hours MDD: 3 tab  lisinopril 5 mg oral tablet: 1 tab(s) orally once a day  melatonin 3 mg oral tablet: 2 tab(s) orally once a day (at bedtime) As needed Sleep  metFORMIN 1000 mg oral tablet: 1 tab(s) orally 2 times a day  metoclopramide 10 mg oral tablet: 1 tab(s) orally every 8 hours  naloxegol 25 mg oral tablet: 1 tab(s) orally once a day  NovoLOG FlexPen 100 units/mL injectable solution: 15 unit(s) subcutaneous 3 times a day (before meals) unit(s) subcutaneous 3 times a day (with meals)  oxyCODONE 15 mg oral tablet: 1 tab(s) orally every 6 hours as needed for Moderate Pain (4 - 6) MDD: 4 tabs  Ozempic 2 mg/3 mL (0.25 mg or 0.5 mg dose) subcutaneous solution: 0.5 milligram(s) subcutaneously once a week  pantoprazole 40 mg oral delayed release tablet: 1 tab(s) orally once a day (before a meal)  pregabalin 100 mg oral capsule: 1 cap(s) orally 3 times a day  spironolactone 25 mg oral tablet: 1 tab(s) orally once a day  Tresiba FlexTouch 100 units/mL subcutaneous solution: 54 unit(s) subcutaneous once a day (at bedtime)   acetaZOLAMIDE 250 mg oral tablet: 2 tab(s) orally 2 times a day  atorvastatin 20 mg oral tablet: 1 tab(s) orally once a day (at bedtime)  baclofen 5 mg oral tablet: 1 tab(s) orally every 8 hours, As needed, Muscle Spasm  bumetanide 2 mg oral tablet: 1 tab(s) orally once a day  butalbital/acetaminophen/caffeine 50 mg-325 mg-40 mg oral tablet: 2 tab(s) orally every 8 hours as needed for migraine  carvedilol 3.125 mg oral tablet: 1 tab(s) orally every 12 hours  diphenhydrAMINE 50 mg oral capsule: 1 cap(s) orally every 8 hours as needed for headache  DULoxetine 30 mg oral delayed release capsule: 1 cap(s) orally 2 times a day  ergocalciferol 1.25 mg (50,000 intl units) oral capsule: 1 cap(s) orally every 7 days take on same day weekly  ketorolac 10 mg oral tablet: 1 tab(s) orally every 8 hours MDD: 3 tab  lisinopril 5 mg oral tablet: 1 tab(s) orally once a day  melatonin 3 mg oral tablet: 2 tab(s) orally once a day (at bedtime) As needed Sleep  metFORMIN 1000 mg oral tablet: 1 tab(s) orally 2 times a day  metoclopramide 10 mg oral tablet: 1 tab(s) orally every 8 hours  naloxegol 25 mg oral tablet: 1 tab(s) orally once a day  Narcan 4 mg/0.1 mL nasal spray: 4 milligram(s) intranasally once a day as needed for Respiratory distress  NovoLOG FlexPen 100 units/mL injectable solution: 15 unit(s) subcutaneous 3 times a day (before meals) unit(s) subcutaneous 3 times a day (with meals)  oxyCODONE 15 mg oral tablet: 1 tab(s) orally every 6 hours as needed for Moderate Pain (4 - 6) MDD: 4 tabs  Ozempic 2 mg/3 mL (0.25 mg or 0.5 mg dose) subcutaneous solution: 0.5 milligram(s) subcutaneously once a week  pantoprazole 40 mg oral delayed release tablet: 1 tab(s) orally once a day (before a meal)  pregabalin 100 mg oral capsule: 1 cap(s) orally 3 times a day  spironolactone 25 mg oral tablet: 1 tab(s) orally once a day  Tresiba FlexTouch 100 units/mL subcutaneous solution: 54 unit(s) subcutaneous once a day (at bedtime)   acetaZOLAMIDE 250 mg oral tablet: 2 tab(s) orally 2 times a day  atorvastatin 20 mg oral tablet: 1 tab(s) orally once a day (at bedtime)  bumetanide 2 mg oral tablet: 1 tab(s) orally once a day  butalbital/acetaminophen/caffeine 50 mg-325 mg-40 mg oral tablet: 2 tab(s) orally every 8 hours as needed for migraine  carvedilol 3.125 mg oral tablet: 1 tab(s) orally every 12 hours  diphenhydrAMINE 50 mg oral capsule: 1 cap(s) orally every 8 hours as needed for headache  DULoxetine 30 mg oral delayed release capsule: 1 cap(s) orally 2 times a day  ergocalciferol 1.25 mg (50,000 intl units) oral capsule: 1 cap(s) orally every 7 days take on same day weekly  ketorolac 10 mg oral tablet: 1 tab(s) orally every 8 hours MDD: 3 tab  lisinopril 5 mg oral tablet: 1 tab(s) orally once a day  melatonin 3 mg oral tablet: 2 tab(s) orally once a day (at bedtime) As needed Sleep  metFORMIN 1000 mg oral tablet: 1 tab(s) orally 2 times a day  naloxegol 25 mg oral tablet: 1 tab(s) orally once a day  Narcan 4 mg/0.1 mL nasal spray: 4 milligram(s) intranasally once a day as needed for Respiratory distress  NovoLOG FlexPen 100 units/mL injectable solution: 15 unit(s) subcutaneous 3 times a day (before meals) unit(s) subcutaneous 3 times a day (with meals)  oxyCODONE 15 mg oral tablet: 1 tab(s) orally every 6 hours as needed for Moderate Pain (4 - 6) MDD: 4 tabs  Ozempic 2 mg/3 mL (0.25 mg or 0.5 mg dose) subcutaneous solution: 0.5 milligram(s) subcutaneously once a week  pantoprazole 40 mg oral delayed release tablet: 1 tab(s) orally once a day (before a meal)  pregabalin 100 mg oral capsule: 1 cap(s) orally 3 times a day  spironolactone 25 mg oral tablet: 1 tab(s) orally once a day  Tresiba FlexTouch 100 units/mL subcutaneous solution: 54 unit(s) subcutaneous once a day (at bedtime)

## 2024-02-21 NOTE — DISCHARGE NOTE PROVIDER - CARE PROVIDER_API CALL
BARB SINGH  [address] [place] [address],    Phone: [phone]  Fax: [phone]  Established Patient  Follow Up Time: 1 week    Gale,   Tel: [phone]  Phone: (   )    -  Fax: (   )    -  Follow Up Time:

## 2024-02-21 NOTE — DISCHARGE NOTE PROVIDER - PROVIDER TOKENS
PROVIDER:[TOKEN:[[id number]],FOLLOWUP:[1 week],ESTABLISHEDPATIENT:[T]],FREE:[LAST:[Gale],PHONE:[(   )    -],FAX:[(   )    -],ADDRESS:[Tel: [phone]]]

## 2024-02-22 RX ORDER — DULOXETINE HYDROCHLORIDE 30 MG/1
1 CAPSULE, DELAYED RELEASE ORAL
Refills: 0 | DISCHARGE

## 2024-02-22 RX ORDER — NALOXONE HYDROCHLORIDE 4 MG/.1ML
4 SPRAY NASAL
Qty: 2 | Refills: 0
Start: 2024-02-22 | End: 2024-02-23

## 2024-02-22 RX ORDER — DULOXETINE HYDROCHLORIDE 30 MG/1
1 CAPSULE, DELAYED RELEASE ORAL
Qty: 60 | Refills: 0
Start: 2024-02-22 | End: 2024-03-22

## 2024-02-29 ENCOUNTER — APPOINTMENT (OUTPATIENT)
Dept: PAIN MANAGEMENT | Facility: CLINIC | Age: 54
End: 2024-02-29
Payer: MEDICARE

## 2024-02-29 DIAGNOSIS — M48.061 SPINAL STENOSIS, LUMBAR REGION WITHOUT NEUROGENIC CLAUDICATION: ICD-10-CM

## 2024-02-29 PROCEDURE — 99204 OFFICE O/P NEW MOD 45 MIN: CPT

## 2024-02-29 PROCEDURE — G2211 COMPLEX E/M VISIT ADD ON: CPT

## 2024-02-29 NOTE — PHYSICAL EXAM
[de-identified] :  Constitutional: Normal, well developed, no acute distress on audio/video examination Eyes: Symmetric, External structures on video examination ENT: Lips, mucosa and tongue normal on video examination Oropharynx: Lips normal, symmetric, no external lesions appreciated appreciated on video examination Respiratory: Non-labored breathing, no audible wheezes appreciated on audio/video examination Vascular: No cyanosis appreciated or edema appreciated on video examination GI:  no jaundice appreciated on video examination Neurovascular: CN grossly intact on video/audio examination, alert MSK: Normal muscle bulk on video examination

## 2024-02-29 NOTE — CONSULT LETTER
[Dear  ___] : Dear ~JAMISON, [Please see my note below.] : Please see my note below. [Consult Letter:] : I had the pleasure of evaluating your patient, [unfilled]. [Sincerely,] : Sincerely, [Consult Closing:] : Thank you very much for allowing me to participate in the care of this patient.  If you have any questions, please do not hesitate to contact me. [FreeTextEntry3] :  Jud Gale DO, MBA Director, [facility]  [facility]esthes[facility]

## 2024-02-29 NOTE — ASSESSMENT
[FreeTextEntry1] : >> Imaging and Other Studies   back and leg pain likely secondary to lumbar radiculopathy and discogenic pain refractory to conservative treatments including 6 consecutive weeks of home exercises/PT, will obtain CT LS to evaluate for pathology  may consider PT vs intervention pending eval  CT Thoracic spine for evaluation of thoracic radiculopathy  >> Therapy and Other Modalities   consider PT  >> Medications   Regarding opiate medication to manage pain. I had a detailed discussion with the patient regarding the risks of long-term opioid use, including the potential for medication side effects, hyperaglesia, endocrine dysfunction,  Encouraged weaning with assistance of current prescriber.   Patient at risk for opioid induced respiratory depression.  Discussed risks, especially with combination of opioids and other sedating substances.  I had extensive discussion with patient and provided patient with Narcan.  Instructed patient to teach those who live with patient on use and to administer should patient be found sedated.  Discussed r/b/a including risk of increased pain, and minor risk of pulmonary edema.  Patient understands, will receive Narcan and keep it for emergency use.   >> Interventions   na  >> Consults   may consider referral to local pain. management resource  >> Discussion of Risks/Benefits/Alternatives    >Regarding any scheduled procedures:   In the event the patient is scheduled for a procedure,   I have discussed in detail with the patient that any interventional pain procedure is associated with potential risks.  The procedure may include an injection of steroids and potentially other medications (local anesthetic and normal saline) into the epidural space or surrounding tissue of the spine.  There are significant risks of this procedure which include and are not limited to infection, bleeding, worsening pain, dural puncture leading to postdural puncture headache, nerve damage, spinal cord injury, paralysis, stroke, and death.   There is a chance that the procedure does not improve their pain.   There are risks associated with the steroid being absorbed into the body systemically.  These include dysphoria, difficulty sleeping, mood swings and personality changes.  Premenopausal women may notice an irregularity in her menstrual cycle for 2-3 months following the injection.  Steroids can specifically affect patients with hypertension, diabetes, and peptic ulcers.  The procedure may cause a temporary increase in blood pressure and blood pressure, and may adversely affect a peptic ulcer.  Other, more rare complications, include avascular necrosis of joints, glaucoma and worsening of osteoporosis.   I have discussed the risks of the procedure at length with the patient, and the potential benefits of pain relief.  I have offered alternatives to the procedure.  All questions were answered.   The patient expressed understanding and wishes to proceed with the procedure.   > Longitudinal management of Complex Painful condition   The patient is being managed for a complex condition that requires ongoing management.  The nature of this condition demands nuanced approach to treatment.  The seriousness of the condition necessitates an in-depth and focused approach to management and coordination with other healthcare professionals.    This visit involves intricate evaluation and management of the patient's condition.  The complexity of the visit was due to the need for detailed assessment of the current state, consideration of potential complications and a careful balancing of treatment options to management the chronic condition effectively.   As detailed above, the patient has a chronic significant painful condition that requires regular and detailed management.  The condition's impact on the patient's quality of life and health is substantial and necessitates a comprehensive and tailored approach   >> Conclusion   There were no barriers to communication. Informed patient that I would be available for any additional questions. Patient was instructed to call with any worsening symptoms including severe pain, new numbness/weakness, or changes in the bowel/bladder function. Discussed role of nsaids in pain management and all relevant risks, if patient is continuing to require after 4 weeks the patient should f/u for alternative treatment. Instructed patient to maintain pain diary to monitor pain level, mobility, and function.   I explained to patient benefits and limitation of TeleMedicine visits  Patient understands that limitations include inability to perform comprehensive physical exam, which may lead to potential diagnostic inconsistencies.    Any scheduled procedures are based on history, imaging and limited physical exam performed on TeleHealth visit.  If necessary, additional focal physical exam will be performed on date of procedure  Patient understands that diagnosis and treatment may be limited by these inconsistencies and patient agrees to proceed with care plan

## 2024-02-29 NOTE — HISTORY OF PRESENT ILLNESS
[Back Pain] : back pain [10] : a maximum pain level of 10/10 [Medical Office: ([facility])___] : at the medical office located in  [Home] : at home, [unfilled] , at the time of the visit. [Opioids for pain that has lasted more than 3 months, or past time of normal tissue healing (> 3 months)] : Opioids for pain that has lasted more than 3 months, or past time of normal tissue healing (> 3 months) [Verbal consent obtained from patient] : the patient, [unfilled] [10 (completely interferes)] : 3. What number best describes how, during the past week, pain has interfered with your general activity? 10/10 pain [50 - 90 MME/day (moderate risk) - consider increase frequency of follow-up and offering naloxone] : 50 - 90 MME/day (moderate risk) - consider increase frequency of follow-up and offering naloxone [4 - 7 Moderate Risk] : Opioid Risk Tool: 4 - 7 Moderate Risk [I-Stop completed at today's visit] : I-Stop completed at today's visit [Last controlled substance contract reviewed and signed with patient:] : Last controlled substance contract reviewed and signed with patient [A discussion was had and/or printed educational materials were provided to the patient inclusive of safe treatment and optimization.] : A discussion was had and/or printed educational materials were provided to the patient inclusive of safe treatment and optimization. The treatment may include non-pharmacologic modalities or a combination of approaches.  We discussed risks, benefits, and alternatives associated with treatment [FreeTextEntry1] : HPI  Ms. 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 Palmer.  preents with right thoracic back pain and mid back after fall in 2014 at work.  Pain is so bad that patient finds it difficult to perform adls and ambulate. denies any worsening numbness, weakness, bowel/bladder dysfunction.    Previous and current pain medications/doses/effects:  hydromorphone  Oxycodone  Pregabalin  Previous Pain Treatments:  PT  Previous Pain Injections:  previous ADELA RFA  Previous Diagnostic Studies/Images:  na

## 2024-03-05 ENCOUNTER — APPOINTMENT (OUTPATIENT)
Dept: HOME HEALTH SERVICES | Facility: HOME HEALTH | Age: 54
End: 2024-03-05
Payer: MEDICARE

## 2024-03-05 VITALS
RESPIRATION RATE: 16 BRPM | OXYGEN SATURATION: 97 % | SYSTOLIC BLOOD PRESSURE: 122 MMHG | HEART RATE: 62 BPM | DIASTOLIC BLOOD PRESSURE: 70 MMHG | TEMPERATURE: 97.88 F

## 2024-03-05 DIAGNOSIS — K59.09 OTHER CONSTIPATION: ICD-10-CM

## 2024-03-05 DIAGNOSIS — G43.909 MIGRAINE, UNSPECIFIED, NOT INTRACTABLE, W/OUT STATUS MIGRAINOSUS: ICD-10-CM

## 2024-03-05 DIAGNOSIS — E78.1 PURE HYPERGLYCERIDEMIA: ICD-10-CM

## 2024-03-05 DIAGNOSIS — M62.838 OTHER MUSCLE SPASM: ICD-10-CM

## 2024-03-05 PROCEDURE — G0506: CPT

## 2024-03-05 PROCEDURE — 99497 ADVNCD CARE PLAN 30 MIN: CPT

## 2024-03-05 PROCEDURE — 99342 HOME/RES VST NEW LOW MDM 30: CPT | Mod: 25

## 2024-03-05 RX ORDER — TRAMADOL HYDROCHLORIDE 50 MG/1
50 TABLET, COATED ORAL
Qty: 60 | Refills: 0 | Status: COMPLETED | COMMUNITY
Start: 2021-06-30 | End: 2024-03-05

## 2024-03-05 RX ORDER — CLINDAMYCIN PHOSPHATE 10 MG/ML
1 LOTION TOPICAL
Qty: 1 | Refills: 5 | Status: COMPLETED | COMMUNITY
Start: 2022-01-10 | End: 2024-03-05

## 2024-03-05 RX ORDER — DICYCLOMINE HYDROCHLORIDE 10 MG/1
10 CAPSULE ORAL EVERY 6 HOURS
Qty: 120 | Refills: 5 | Status: COMPLETED | COMMUNITY
Start: 2020-11-06 | End: 2024-03-05

## 2024-03-05 RX ORDER — MUPIROCIN 20 MG/G
2 OINTMENT TOPICAL
Qty: 1 | Refills: 2 | Status: COMPLETED | COMMUNITY
Start: 2022-01-10 | End: 2024-03-05

## 2024-03-05 RX ORDER — CHOLESTYRAMINE 4 G/5.5G
4 POWDER, FOR SUSPENSION ORAL TWICE DAILY
Qty: 180 | Refills: 3 | Status: COMPLETED | COMMUNITY
Start: 2021-04-29 | End: 2024-03-05

## 2024-03-05 RX ORDER — LACTULOSE 10 G/15ML
10 SOLUTION ORAL TWICE DAILY
Refills: 0 | Status: COMPLETED | COMMUNITY
Start: 2020-10-27 | End: 2024-03-05

## 2024-03-05 RX ORDER — HYDROCORTISONE 10 MG/G
1 CREAM TOPICAL TWICE DAILY
Qty: 1 | Refills: 1 | Status: COMPLETED | COMMUNITY
Start: 2021-09-23 | End: 2024-03-05

## 2024-03-05 RX ORDER — CYCLOBENZAPRINE HYDROCHLORIDE 10 MG/1
10 TABLET, FILM COATED ORAL
Qty: 270 | Refills: 3 | Status: COMPLETED | COMMUNITY
Start: 2020-10-27 | End: 2024-03-05

## 2024-03-05 RX ORDER — ONDANSETRON 8 MG/1
8 TABLET ORAL
Qty: 15 | Refills: 3 | Status: COMPLETED | COMMUNITY
Start: 2021-02-02 | End: 2024-03-05

## 2024-03-05 RX ORDER — BENZOYL PEROXIDE 100 MG/ML
10 LIQUID TOPICAL DAILY
Qty: 1 | Refills: 3 | Status: COMPLETED | COMMUNITY
Start: 2023-06-15 | End: 2024-03-05

## 2024-03-05 RX ORDER — DICLOFENAC SODIUM 75 MG/1
75 TABLET, DELAYED RELEASE ORAL TWICE DAILY
Qty: 60 | Refills: 1 | Status: COMPLETED | COMMUNITY
Start: 2021-05-11 | End: 2024-03-05

## 2024-03-05 RX ORDER — BUMETANIDE 2 MG/1
2 TABLET ORAL
Qty: 90 | Refills: 0 | Status: COMPLETED | COMMUNITY
Start: 2023-03-07 | End: 2024-03-05

## 2024-03-05 RX ORDER — INSULIN LISPRO 100 [IU]/ML
100 INJECTION, SOLUTION SUBCUTANEOUS
Refills: 0 | Status: COMPLETED | COMMUNITY
End: 2024-03-05

## 2024-03-05 RX ORDER — CLINDAMYCIN PHOSPHATE 10 MG/ML
1 LOTION TOPICAL
Qty: 1 | Refills: 2 | Status: COMPLETED | COMMUNITY
Start: 2023-06-15 | End: 2024-03-05

## 2024-03-05 RX ORDER — BENZOYL PEROXIDE 100 MG/ML
10 LIQUID TOPICAL
Qty: 1 | Refills: 11 | Status: COMPLETED | COMMUNITY
Start: 2022-01-10 | End: 2024-03-05

## 2024-03-05 RX ORDER — GABAPENTIN 100 MG/1
100 CAPSULE ORAL
Refills: 0 | Status: COMPLETED | COMMUNITY
End: 2024-03-05

## 2024-03-05 RX ORDER — TIZANIDINE 4 MG/1
0 TABLET ORAL
Refills: 0 | DISCHARGE
Start: 2024-03-05

## 2024-03-05 RX ORDER — AZELASTINE HYDROCHLORIDE 0.5 MG/ML
0.05 SOLUTION/ DROPS OPHTHALMIC
Qty: 1 | Refills: 1 | Status: COMPLETED | COMMUNITY
Start: 2021-09-20 | End: 2024-03-05

## 2024-03-05 RX ORDER — BIOTIN 10 MG
TABLET ORAL
Refills: 0 | Status: COMPLETED | COMMUNITY
End: 2024-03-05

## 2024-03-05 RX ORDER — HYDROXYCHLOROQUINE SULFATE 200 MG/1
200 TABLET, FILM COATED ORAL DAILY
Qty: 60 | Refills: 2 | Status: COMPLETED | COMMUNITY
Start: 2022-01-11 | End: 2024-03-05

## 2024-03-05 RX ORDER — SIMETHICONE 80 MG/1
80 TABLET, CHEWABLE ORAL
Qty: 180 | Refills: 3 | Status: COMPLETED | COMMUNITY
Start: 2020-10-27 | End: 2024-03-05

## 2024-03-05 RX ORDER — LIDOCAINE HCL 4 %
4 LIQUID ROLL-ON (ML) TOPICAL
Refills: 0 | Status: COMPLETED | COMMUNITY
Start: 2021-07-12 | End: 2024-03-05

## 2024-03-05 RX ORDER — MECLIZINE HYDROCHLORIDE 25 MG/1
25 TABLET ORAL 3 TIMES DAILY
Qty: 30 | Refills: 2 | Status: COMPLETED | COMMUNITY
Start: 2020-10-27 | End: 2024-03-05

## 2024-03-05 RX ORDER — DULOXETINE HYDROCHLORIDE 30 MG/1
30 CAPSULE, DELAYED RELEASE PELLETS ORAL
Refills: 0 | Status: COMPLETED | COMMUNITY
End: 2024-03-05

## 2024-03-05 RX ORDER — LOPERAMIDE HYDROCHLORIDE 2 MG/1
2 CAPSULE ORAL
Qty: 30 | Refills: 0 | Status: COMPLETED | COMMUNITY
Start: 2022-01-07 | End: 2024-03-05

## 2024-03-05 RX ORDER — LANCETS 33 GAUGE
EACH MISCELLANEOUS
Qty: 100 | Refills: 0 | Status: COMPLETED | COMMUNITY
Start: 2021-09-20 | End: 2024-03-05

## 2024-03-07 PROBLEM — E78.1 HIGH TRIGLYCERIDES: Status: ACTIVE | Noted: 2024-03-05

## 2024-03-07 PROBLEM — M62.838 MUSCLE SPASM: Status: ACTIVE | Noted: 2024-03-05

## 2024-03-07 PROBLEM — G43.909 MIGRAINE: Status: ACTIVE | Noted: 2024-03-05

## 2024-03-07 PROBLEM — K59.09 CHRONIC CONSTIPATION: Status: ACTIVE | Noted: 2024-03-05

## 2024-03-07 NOTE — HISTORY OF PRESENT ILLNESS
[House Calls Co-Management Patient] : [unfilled] is a House Calls co-management patient [In-Place] : has Home Health services in-place [PT] : PT [OT] : OT [A] : A [Patient is stable - had PCP appoinment] : patient is stable - had PCP appointment [Patient] : patient [LastPCPVisitDate] : 3/4/2024 [FreeTextEntry1] : [facility] [FreeTextEntry2] : COVID SCREEN: Patient or caretaker denies fever, cough, trouble breathing, rash, vomiting. Patient has not been in close contact with anyone who is COVID-19 positive, or suspected of having COVID-19.  N95 mask, gloves, eye wear and gown (if indicated) used during visit: Yes.  HPI: 54yo F with PMHX of bipolar II disorder, depression, class III obesity (41 kg/m2), IBS-D, T2DM, GERD, asthma. high triglyceride, HTN, low back pain. Seen today for admission to house calls program.   53-year-old female being seen for an initial visit, met with patient during initial visit, Patient reports that she currently has 30 hours of CDPAP (daughter is her aide) via [email or web address]. Pt receives PT 2x per week until April. Patient independently provided all information. Patient was on the program in the past but due to admission at [facility] for LTC patient was discharged in 2023. Pt was in the NH due to being in coma for about a week and required JAI. Patient was admitted to [facility] in February and discharged home. Patient reports that she has one daughter and a close friend that is involved. 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  Interval Events: -[facility] twice a week PT Patient stated he saw kidney doctor who stated pt have CKD stage 3. Patient did not elaborate any more.   Medical Issues:  Asthma- on Nebulizer, Inhaler  HTN- On Lisinopril high Triglyceride- ON Atorvastatin Bipolar disorder- Not on meds at this time.  DM- On Insulin  Subjective: 1. Appetite/Weight: Class III obesity with BMI ~42, appetite fair, weight loss 20 lbs, 330ls 3/ 2. Gait/Falls: Steady gait, Cane dependent. No falls 3. Sleep: fair 4. BMs: No concerns, take, meds 5. Urine: No concerns 6. Skin: No rash or ulcers. 7. Mood/Memory: Notes recent small memory lapses, mood fair not depressed, Zoroastrianism support system. 8. Hospitalization: comma 22 to 2022 to Nursing Home d/c 2024  to  [facility] DME: Cane, Oxygen concentrator, Nebulizer, Glucometer w/c, rollator, shower chair, raised toilet sit.  Social: Lives with daughter Caregiver: Daughter is CDPAP AIDE 30 hours / week MOLST: Full Code HCP-  -PCP Anirudh Maldonado, all blood work done by her.   ADVANCE CARE PLANNIN. Total Time Spent: 20 min 2. Participants: Myself, patient, SW 3. Discussion: Goals of Care, Advanced Directives, Health Care Agency, and Disease trajectory 4. Disease Trajectory: Discussed and reviewed that patient's health is currently stable, and the prognosis moving forward is unclear. 5. Prognosis, Based On Clinician Best Judgment: Indeterminate 6. Goals of Care: 1) Extend life, by hospitalization and aggressive measures if needed, 2) Avoid discomfort, 3) Manage medical problems at home where safely possible. 7. HCA: Friend Tony primary, cousin Shashank secondary. 8. MOLST Completed: CPR yes, intubation long term, do hospitalize, no limitation on medical interventions, trial feeding tube, trial IVF, use antibiotics, Dialysis. 9. Hospice Benefit discussion deferred at this time.

## 2024-03-07 NOTE — PHYSICAL EXAM
[No Acute Distress] : no acute distress [Normal Voice/Communication] : normal voice communication [Normal Sclera/Conjunctiva] : normal sclera/conjunctiva [PERRL] : pupils equal, round and reactive to light [Normal Outer Ear/Nose] : the ears and nose were normal in appearance [Normal Oropharynx] : the oropharynx was normal [No JVD] : no jugular venous distention [Supple] : the neck was supple [Clear to Auscultation] : lungs were clear to auscultation bilaterally [No Respiratory Distress] : no respiratory distress [Regular Rhythm] : with a regular rhythm [No Accessory Muscle Use] : no accessory muscle use [Normal Rate] : heart rate was normal  [Normal Bowel Sounds] : normal bowel sounds [Non Tender] : non-tender [No CVA Tenderness] : no ~M costovertebral angle tenderness [Soft] : abdomen soft [Normal Gait] : normal gait [No Spinal Tenderness] : no spinal tenderness [No Skin Lesions] : no skin lesions [No Rash] : no rash [Oriented x3] : oriented to person, place, and time [de-identified] : +1 edema Isaak leg pt encourage to elevate legs when sitting.

## 2024-03-07 NOTE — REASON FOR VISIT
[Initial Evaluation] : an initial evaluation [Pre-Visit Preparation] : pre-visit preparation was done [Intercurrent Specialty/Sub-specialty Visits] : the patient has intercurrent specialty/sub-specialty visits [FreeTextEntry2] : chart review

## 2024-03-07 NOTE — REVIEW OF SYSTEMS
[Fatigue] : fatigue [FreeTextEntry6] : Intermittent SOB since COVID infection. [Negative] : Respiratory

## 2024-03-07 NOTE — CURRENT MEDS
[Medication and Allergies Reconciled] : medication and allergies reconciled [Reviewed patient reported medication adherence from Comprehensive Assessment] : Reviewed patient reported medication adherence from comprehensive assessment [Adherent to medications] : Patient is adherent to medications as prescribed [High Risk Medications Reviewed and Reconciled (Beers Criteria)] : high risk medications reviewed and reconciled

## 2024-03-07 NOTE — COUNSELING
[Obese -  ( BMI  >29.9 )] : obese - ( BMI  >29.9 ) [Weight counseling provided] : weight counseling provided [DASH diet recommended] : DASH diet recommended [Continue diet as tolerated] : continue diet as tolerated based on goals of care [Improve mobility] : improve mobility [Improve weight] : improve weight [Improve pain control] : improve pain control [Decrease hospital use] : decrease hospital use [Decrease stress] : decrease stress [Minimize unnecessary interventions] : minimize unnecessary interventions [Completed Medical Orders for Life-Sustaining Treatment] : completed medical orders for life-sustaining treatment [No Limitations] : Treatment Guidelines: No limitations [Full Code] : Code Status: Full Code [Long Term Intubation] : Intubation: Long term intubation [_____] : HCP: [unfilled] [[facility] Date: _____[facility] [unfilled] [ - New patient with 2 or more chronic conditions; CCM discussed and patient-centered care plan established] : New patient with 2 or more chronic conditions; CCM discussed and patient-centered care plan established

## 2024-03-07 NOTE — HEALTH RISK ASSESSMENT
[No falls in past year] : Patient reported no falls in the past year [No] : The patient does not have visual impairment [HRA Reviewed] : Health risk assessment reviewed [Independent] : toileting [Some assistance needed] : dressing [Full assistance needed] : using transportation [FreeTextEntry1] : shower chair, grab bars [FreeTextEntry4] : Raised toilet seat [FreeTextEntry2] : A assist [FreeTextEntry8] : has walker/wheelchair if needed. [TimeGetUpGo] : 10

## 2024-03-11 ENCOUNTER — APPOINTMENT (OUTPATIENT)
Dept: PAIN MANAGEMENT | Facility: CLINIC | Age: 54
End: 2024-03-11

## 2024-03-18 ENCOUNTER — APPOINTMENT (OUTPATIENT)
Dept: PAIN MANAGEMENT | Facility: CLINIC | Age: 54
End: 2024-03-18
Payer: MEDICARE

## 2024-03-18 VITALS — HEIGHT: 68 IN | BODY MASS INDEX: 44.41 KG/M2 | WEIGHT: 293 LBS

## 2024-03-18 DIAGNOSIS — G89.29 DORSALGIA, UNSPECIFIED: ICD-10-CM

## 2024-03-18 DIAGNOSIS — M54.9 DORSALGIA, UNSPECIFIED: ICD-10-CM

## 2024-03-18 PROCEDURE — 99204 OFFICE O/P NEW MOD 45 MIN: CPT

## 2024-03-18 NOTE — PHYSICAL EXAM
[de-identified] : Gen: NAD Head: NC/AT Eyes: wears glasses, no scleral icterus ENT: mucous membranes moist CV: No JVD Lungs: nonlabored breathing Abd: soft, NT/ND Ext: full ROM in all extremities, no peripheral edema Back: +TTP in the right posterolateral chest wall and right thoracic and lumbar paraspinal region; limited extension 2/2 pain Neuro: CN intact LEs +5 L +5 R hip flexion +5 L +5 R leg extension +5 L +5 R leg flexion +5 L +5 R foot dorsiflexion +5 L +5 R foot plantarflexion +5 L +5 R EHL extension Psych: normal affect Skin: no visible lesions

## 2024-03-18 NOTE — DISCUSSION/SUMMARY
[de-identified] : ROBIN LÓPEZ is a 53 year-old woman presenting for a NPV for a history of chronic low back pain.   Prior treatment: SCS DRG implantation for intercostal neuralgia ([facility]) Chronic opioid therapy - currently taking hydromorphone 4mg Pregabalin  Gabapentin Lidocaine patches  [place] ISTOP PDMP was checked and appropriate. Last prescriptions filled: 3/5/2024: hydromorphone 4mg #20 tabs 2/9/2024: pregabalin 100mg #42 tabs 2/9/2024: hydromorphone 4mg #20 tabs 1/26/2024: oxycodone 20mg #60 tabs  Plan: 1) Patient to obtain CT thoracic spine - she has it scheduled for 3/22 2) Consider ITESI vs ICNB 3) Continue pregabalin 200mg TID; denies AEs 4) Discussed with the patient that I will not prescribe her opioids 5) Continue hydromorphone prn through other provider 6) Continue baclofen 5mg TID prn; denies AEs 7) May consider dorsal column SCS trial 8) RTC 1 week to review CT results

## 2024-03-18 NOTE — HISTORY OF PRESENT ILLNESS
[Upper back] : upper back [Lower back] : lower back [Right Arm] : right arm [Right Leg] : right leg [Sharp] : sharp [Shooting] : shooting [Stabbing] : stabbing [Household chores] : household chores [Constant] : constant [Social interactions] : social interactions [Sitting] : sitting [Standing] : standing [Walking] : walking [Exercising] : exercising [Stairs] : stairs [Coughing] : coughing [] : yes [FreeTextEntry1] : 3/18/2024: ROBIN LÓPEZ is a 53 year-old woman presenting for a NPV for a history of chronic mid back pain. The patient notes having some intermittent right-sided chest wall pain since ~2016. 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 At this time, the patient states that the worst of her pain is in the right mid back with radiation to the flank region. The patient notes that this pain radiates anteriorly to the lateral chest wall and abdominal wall. Pain is worse with increased activity, including sitting, standing, and walking. She notes that she is only able to walk for a few minutes before she has to stop and rest. The patient states that average pain over the past week was 8/10 in severity. Mood: Patient has depression and anxiety. She is not currently treated for this. Sleep: Patient notes difficulty with sleep initiation and maintenance 2/2 pain.

## 2024-03-19 ENCOUNTER — TRANSCRIPTION ENCOUNTER (OUTPATIENT)
Age: 54
End: 2024-03-19

## 2024-03-19 ENCOUNTER — INPATIENT (INPATIENT)
Facility: HOSPITAL | Age: 54
LOS: 6 days | Discharge: HOME | DRG: 73 | End: 2024-03-26
Attending: HOSPITALIST | Admitting: STUDENT IN AN ORGANIZED HEALTH CARE EDUCATION/TRAINING PROGRAM
Payer: COMMERCIAL

## 2024-03-19 ENCOUNTER — NON-APPOINTMENT (OUTPATIENT)
Age: 54
End: 2024-03-19

## 2024-03-19 VITALS
TEMPERATURE: 98 F | HEART RATE: 71 BPM | DIASTOLIC BLOOD PRESSURE: 69 MMHG | SYSTOLIC BLOOD PRESSURE: 120 MMHG | RESPIRATION RATE: 22 BRPM | HEIGHT: 68 IN | OXYGEN SATURATION: 96 % | WEIGHT: 293 LBS

## 2024-03-19 DIAGNOSIS — Z90.49 ACQUIRED ABSENCE OF OTHER SPECIFIED PARTS OF DIGESTIVE TRACT: Chronic | ICD-10-CM

## 2024-03-19 DIAGNOSIS — Z98.890 OTHER SPECIFIED POSTPROCEDURAL STATES: Chronic | ICD-10-CM

## 2024-03-19 DIAGNOSIS — E83.42 HYPOMAGNESEMIA: ICD-10-CM

## 2024-03-19 LAB
ALBUMIN SERPL ELPH-MCNC: 3.7 G/DL — SIGNIFICANT CHANGE UP (ref 3.3–5)
ALP SERPL-CCNC: 124 U/L — HIGH (ref 40–120)
ALT FLD-CCNC: 18 U/L — SIGNIFICANT CHANGE UP (ref 10–45)
ANION GAP SERPL CALC-SCNC: 14 MMOL/L — SIGNIFICANT CHANGE UP (ref 5–17)
AST SERPL-CCNC: 19 U/L — SIGNIFICANT CHANGE UP (ref 10–40)
BASOPHILS # BLD AUTO: 0.03 K/UL — SIGNIFICANT CHANGE UP (ref 0–0.2)
BASOPHILS NFR BLD AUTO: 0.3 % — SIGNIFICANT CHANGE UP (ref 0–2)
BILIRUB SERPL-MCNC: 0.4 MG/DL — SIGNIFICANT CHANGE UP (ref 0.2–1.2)
BUN SERPL-MCNC: 13 MG/DL — SIGNIFICANT CHANGE UP (ref 7–23)
CALCIUM SERPL-MCNC: 6.7 MG/DL — LOW (ref 8.4–10.5)
CHLORIDE SERPL-SCNC: 101 MMOL/L — SIGNIFICANT CHANGE UP (ref 96–108)
CO2 SERPL-SCNC: 27 MMOL/L — SIGNIFICANT CHANGE UP (ref 22–31)
CREAT SERPL-MCNC: 0.9 MG/DL — SIGNIFICANT CHANGE UP (ref 0.5–1.3)
EGFR: 76 ML/MIN/1.73M2 — SIGNIFICANT CHANGE UP
EOSINOPHIL # BLD AUTO: 0.23 K/UL — SIGNIFICANT CHANGE UP (ref 0–0.5)
EOSINOPHIL NFR BLD AUTO: 2.2 % — SIGNIFICANT CHANGE UP (ref 0–6)
GLUCOSE SERPL-MCNC: 160 MG/DL — HIGH (ref 70–99)
HCT VFR BLD CALC: 35.4 % — SIGNIFICANT CHANGE UP (ref 34.5–45)
HGB BLD-MCNC: 11.6 G/DL — SIGNIFICANT CHANGE UP (ref 11.5–15.5)
IMM GRANULOCYTES NFR BLD AUTO: 0.4 % — SIGNIFICANT CHANGE UP (ref 0–0.9)
LYMPHOCYTES # BLD AUTO: 2.65 K/UL — SIGNIFICANT CHANGE UP (ref 1–3.3)
LYMPHOCYTES # BLD AUTO: 24.8 % — SIGNIFICANT CHANGE UP (ref 13–44)
MAGNESIUM SERPL-MCNC: 0.9 MG/DL — CRITICAL LOW (ref 1.6–2.6)
MCHC RBC-ENTMCNC: 29.4 PG — SIGNIFICANT CHANGE UP (ref 27–34)
MCHC RBC-ENTMCNC: 32.8 GM/DL — SIGNIFICANT CHANGE UP (ref 32–36)
MCV RBC AUTO: 89.8 FL — SIGNIFICANT CHANGE UP (ref 80–100)
MONOCYTES # BLD AUTO: 0.48 K/UL — SIGNIFICANT CHANGE UP (ref 0–0.9)
MONOCYTES NFR BLD AUTO: 4.5 % — SIGNIFICANT CHANGE UP (ref 2–14)
NEUTROPHILS # BLD AUTO: 7.25 K/UL — SIGNIFICANT CHANGE UP (ref 1.8–7.4)
NEUTROPHILS NFR BLD AUTO: 67.8 % — SIGNIFICANT CHANGE UP (ref 43–77)
NRBC # BLD: 0 /100 WBCS — SIGNIFICANT CHANGE UP (ref 0–0)
NT-PROBNP SERPL-SCNC: 58 PG/ML — SIGNIFICANT CHANGE UP (ref 0–300)
PLATELET # BLD AUTO: 469 K/UL — HIGH (ref 150–400)
POTASSIUM SERPL-MCNC: 3.4 MMOL/L — LOW (ref 3.5–5.3)
POTASSIUM SERPL-SCNC: 3.4 MMOL/L — LOW (ref 3.5–5.3)
PROT SERPL-MCNC: 7.6 G/DL — SIGNIFICANT CHANGE UP (ref 6–8.3)
RBC # BLD: 3.94 M/UL — SIGNIFICANT CHANGE UP (ref 3.8–5.2)
RBC # FLD: 13.2 % — SIGNIFICANT CHANGE UP (ref 10.3–14.5)
SODIUM SERPL-SCNC: 142 MMOL/L — SIGNIFICANT CHANGE UP (ref 135–145)
TROPONIN T, HIGH SENSITIVITY RESULT: 8 NG/L — SIGNIFICANT CHANGE UP (ref 0–51)
WBC # BLD: 10.68 K/UL — HIGH (ref 3.8–10.5)
WBC # FLD AUTO: 10.68 K/UL — HIGH (ref 3.8–10.5)

## 2024-03-19 PROCEDURE — 99223 1ST HOSP IP/OBS HIGH 75: CPT

## 2024-03-19 PROCEDURE — 71045 X-RAY EXAM CHEST 1 VIEW: CPT | Mod: 26

## 2024-03-19 PROCEDURE — 99285 EMERGENCY DEPT VISIT HI MDM: CPT

## 2024-03-19 RX ORDER — POTASSIUM CHLORIDE 20 MEQ
20 PACKET (EA) ORAL ONCE
Refills: 0 | Status: COMPLETED | OUTPATIENT
Start: 2024-03-19 | End: 2024-03-19

## 2024-03-19 RX ORDER — MAGNESIUM SULFATE 500 MG/ML
2 VIAL (ML) INJECTION ONCE
Refills: 0 | Status: COMPLETED | OUTPATIENT
Start: 2024-03-19 | End: 2024-03-19

## 2024-03-19 RX ORDER — KETAMINE HYDROCHLORIDE 100 MG/ML
30 INJECTION INTRAMUSCULAR; INTRAVENOUS ONCE
Refills: 0 | Status: DISCONTINUED | OUTPATIENT
Start: 2024-03-19 | End: 2024-03-19

## 2024-03-19 RX ORDER — SODIUM CHLORIDE 9 MG/ML
250 INJECTION INTRAMUSCULAR; INTRAVENOUS; SUBCUTANEOUS ONCE
Refills: 0 | Status: COMPLETED | OUTPATIENT
Start: 2024-03-19 | End: 2024-03-19

## 2024-03-19 RX ADMIN — SODIUM CHLORIDE 250 MILLILITER(S): 9 INJECTION INTRAMUSCULAR; INTRAVENOUS; SUBCUTANEOUS at 21:06

## 2024-03-19 RX ADMIN — Medication 25 GRAM(S): at 21:55

## 2024-03-19 RX ADMIN — KETAMINE HYDROCHLORIDE 400 MILLIGRAM(S): 100 INJECTION INTRAMUSCULAR; INTRAVENOUS at 20:47

## 2024-03-19 RX ADMIN — Medication 20 MILLIEQUIVALENT(S): at 21:55

## 2024-03-19 NOTE — H&P ADULT - PROBLEM SELECTOR PLAN 2
On multiple diuretics for chronic B/L LE oedema (unclear if patient is on Diamox for history of apparent idiopathic intracranial HTN but patient not compliant with Diamox due to patient's concern with multiple diuretics and past acute kidney injury) with borderline low systolic BP on presentation>> will temporarily HOLD diuretics and would reassess in the AM.    Echo ordered.

## 2024-03-19 NOTE — H&P ADULT - HISTORY OF PRESENT ILLNESS
NIGHT HOSPITALIST:    Patient UNKNOWN to me previously, assigned to me at this point via the ER to admit this 54 y/o F with BMI 53.4, history of apparent HF with preserved EF, S/P past cardiac cath with normal coronaries but undifferentiated severe pulmonary HTN, with patient on multiple diuretic regimens (Bumex, Diamox, Aldactone) for chronic lower leg oedema, type 2 DM on metformin but also preprandial Novolog insulin 12 U and Tresiba 45 units at bedtime, essential HTN on Coreg, lisinopril, presumed GERD on a PPI, idiopathic intracranial HTN (diagnosed 2020), complex regional pain syndrome with past spinal implant placed at [facility] in 2022 with complications with multifocal pneumonia in July 2022 with acute respiratory failure, complicated with  ARDS and with BiPAP>intubation/extubation, Oct 2022 at [facility] with dyspnoea and B/L LE oedema with apparent diastolic HF presentation, referral for elective left and right heart cath at [facility] in Nov 2022 with normal coronaries but with severe pulmonary HTN on RHC, Dec 2023 with admission at [facility] for HA with nondiagnostic CTT head, MRI precluded due to patient's spinal implant, with patient seen by Neuro and S/P IR LP at the time with diagnosis of idiopathic intracranial HTN (presumably maintained on Diamox PRN?), past chronic kidney injury, with patient self referring to [facility] following referral by her Pain Consultant above following no relief with short course of oral Dilaudid 4mg with no relief of Oxycodone IR titrated to 20 mg (see [place] I Stop).  Review of patient's admission at [facility] from discharge from Feb 2024 with patient seen by Pain Service, with partial relief with 0.5 mg IV Dilaudid.   Patient with relief with Dilaudid 1 mg IV in the ER (had received IV ketamine by the ER attending earlier).  Patient notes diffuse body pain, centered on the RIGHT trunk, chest pain.   NO chest pain/pressure at present.  NO dyspnoea, no cough, no wheezing.   NO HA, no focal weakness.  NO N/V.   Patient tolerating and wishes PO intake. NIGHT HOSPITALIST:    Patient UNKNOWN to me previously, assigned to me at this point via the ER to admit this 54 y/o F with BMI 53.4, history of apparent HF with preserved EF, S/P past cardiac cath with normal coronaries but undifferentiated severe pulmonary HTN, with patient on multiple diuretic regimens (Bumex, Diamox, Aldactone) for chronic lower leg oedema, type 2 DM on metformin but also preprandial Novolog insulin 12 U and Tresiba 45 units at bedtime, essential HTN on Coreg, lisinopril, presumed GERD on a PPI, idiopathic intracranial HTN (diagnosed 2020), complex regional pain syndrome with past spinal implant placed at [facility] in 2022 with complications with multifocal pneumonia in July 2022 with acute respiratory failure, complicated with  ARDS and with BiPAP>intubation/extubation at [facility], Oct 2022 at [facility] with dyspnoea and B/L LE oedema with apparent diastolic HF presentation, referral for elective left and right heart cath at [place] in Nov 2022 with normal coronaries but with severe pulmonary HTN on RHC, Dec 2023 with admission at [facility] for HA with nondiagnostic CTT head, MRI precluded due to patient's spinal implant, with patient seen by Neuro and S/P IR LP at the time with diagnosis of idiopathic intracranial HTN (presumably maintained on Diamox PRN?), past chronic kidney injury, with patient self referring to [place] following referral by her Pain Consultant above following no relief with short course of oral Dilaudid 4mg with no relief of Oxycodone IR titrated to 20 mg (see [place] I Stop).  Review of patient's admission at [facility] from discharge from Feb 2024 with patient seen by Pain Service, with partial relief with 0.5 mg IV Dilaudid.   Patient with relief with Dilaudid 1 mg IV in the ER (had received IV ketamine by the ER attending earlier).  Patient notes diffuse body pain, centered on the RIGHT trunk, chest pain.   NO chest pain/pressure at present.  NO dyspnoea, no cough, no wheezing.   NO HA, no focal weakness.  NO N/V.   Patient tolerating and wishes PO intake.

## 2024-03-19 NOTE — H&P ADULT - PROBLEM SELECTOR PLAN 1
Complex medical and surgical history with past HF with preserved EF% with normal coronaries on cath but with undifferentiated severe pulmonary HTN, on multiple diuretics for chronic B/L LE oedema.       Unclear if patient is on Diamox for history of apparent idiopathic intracranial HTN but patient not compliant with Diamox due to patient's concern with multiple diuretics and past acute kidney injury  with borderline low systolic BP on presentation--will temporarily HOLD diuretics.        Apparent breakthrough complex regional pain syndrome with no relief with Oxycodone IR nor oral Dilaudid and only relief with Dilaudid 1 mg IV in the ER.   Risks/benefits reviewed with patient who agrees with regimen as above.      Would consider formal Pain Service evaluation in the AM.

## 2024-03-19 NOTE — ED PROVIDER NOTE - PHYSICAL EXAMINATION
CONSTITUTIONAL: In no apparent distress.  ENT: Airway patent, moist mucous membranes.   EYES: Pupils equal, round and reactive to light. EOMI. Conjunctiva normal appearing.   CARDIAC: Normal rate, regular rhythm.  Heart sounds S1, S2.    RESPIRATORY: Breath sounds clear and equal bilaterally.   GASTROINTESTINAL: Abdomen soft, non-tender, not distended.  MUSCULOSKELETAL: Spine appears normal.  NEUROLOGICAL: Alert and oriented x3, grossly normal.

## 2024-03-19 NOTE — H&P ADULT - NSHPOUTPATIENTPROVIDERS_GEN_ALL_CORE
Yariel Espinoza M.D.   Lowell Don MD (Pain Service-Anaesthesia) [phone]  Davin Salas MD (Pulmonary) [phone]

## 2024-03-19 NOTE — ED ADULT NURSE REASSESSMENT NOTE - NS ED NURSE REASSESS COMMENT FT1
Pt bladder scanned, displayed 530cc of urine in bladder. MD Easton aware, no interventions are needed at this time.

## 2024-03-19 NOTE — H&P ADULT - NSHPLABSRESULTS_GEN_ALL_CORE
EKG tracing interpreted by me with NSR at 70 with Q V1V2.    QTc 479    Chest radiograph interpreted by me with no infiltrate or effusion.    WBC 10.6  67N    Hgb 11.6    Platelets of 469K    Random glucose of 160  HCO3 27    Cr 0.9  K+ 3.4>>supplemented.    Mg++ 0.9>>supplemented.    Ca++ 6.7    BNP 58 EKG tracing interpreted by me with NSR at 70 with Q V1V2.    QTc 479    Chest radiograph interpreted by me with no infiltrate or effusion.    WBC 10.6  67N    Hgb 11.6    Platelets of 469K    Random glucose of 160  HCO3 27    Cr 0.9  K+ 3.4>>supplemented.    Mg++ 0.9>>supplemented>>1.3  -- supplementation reordered.    Ca++ 6.7    BNP 58

## 2024-03-19 NOTE — H&P ADULT - PROBLEM SELECTOR PLAN 4
Patient tolerating and wishes to continue her diet.   Nutritional consult.   Aspiration precautions.  Will provide half dose of patient's preprandial short acting insulin (12U >> 6U) and will provide 0.2U/kg/24 HR for bedtime Lantus (30 U).      Would consider formal Endocrinology evaluation in the AM.

## 2024-03-19 NOTE — H&P ADULT - PROBLEM SELECTOR PLAN 6
Patient agrees to pharmacologic DVT prophylaxis.   Will obtain B/L venous Duplex to exclude an occult DVT.

## 2024-03-19 NOTE — ED ADULT NURSE NOTE - OBJECTIVE STATEMENT
52YO female with pmhx of chronic pain syndrome, and muscle spasm comes to the ED via ems with c/o pain. Pt states having a flare up, numbness/tingling down B/L UE patient states symptoms are baseline @ flare up but this time pain is intolerable. Pt endorses urinary symptoms, hasn't been able to urinate since 4am in the morning, urinary incontinence for 2 months is following up with MD. Pt denies fevers, n/v/d or fevers. Pt placed in position of comfort. Bed in lowest position, wheels locked, appropriate side rails raised. Pt denies needs at this time.

## 2024-03-19 NOTE — H&P ADULT - NSHPREVIEWOFSYSTEMS_GEN_ALL_CORE
NO HA< no focal weakness.  NO chest pain/pressure.   NO palpitations.  NO cough, no dyspnoea, no wheezing.  No breast symptoms  No abdominal pain, no red blood per rectum or melena.    NO back pain, no tearing back pain.  NO SI/HI><  NO thyroid symptoms  NO dysuria, no hematuria.  NO vaginal bleeding.  NO fever, no chills, no rigors.    Patient reports last COVID-19 booster jab in 2022.

## 2024-03-19 NOTE — ED PROVIDER NOTE - CLINICAL SUMMARY MEDICAL DECISION MAKING FREE TEXT BOX
54 yo F with a PMH of CHF, HTN, HLD, DM, CKD, Idiopathic intracranial hypertension (diagnosed 2020), chronic right sided back pain (intercostal neuralgia/complex regional pain syndrome) s/p spinal stimulator followed by pain management BIBEMS for diffuse pain worse in her upper extremities, localized to her hands specifically x yesterday. Pain described as stiffness c/w prior pain flares. States she was taking Dilaudid 4mg q6hrs prescribed to her by pain management but ran out of medication as she was only prescribed short course. Has been taking Tylenol and Lyrica w/o relief. Last night she also began to experience substernal chest pain described as someone sitting on her chest. Has never had before. Sxs a/w SOB. No diaphoresis or lightheadedness. States she has had a cardiac w/u before but cannot recall what imaging was done. Per chart review, pt had a cardiac cath 11/2022 that revealed no occlusive disease. Has not followed up with Cards since. Denies nausea, vomiting, fever, chills, cough. Plan for labs-cbc/cmp/trop, EKG, cardiac monitor and pain control- IV ketamine. Will likely admit for PM cs and cardiac w/u. Vero Wei PA-C

## 2024-03-19 NOTE — H&P ADULT - ASSESSMENT
NIGHT HOSPITALIST:   Complex medical and surgical history with past HF with preserved EF% with normal coronaries on cath but with undifferentiated severe pulmonary HTN, on multiple diuretics for chronic B/L LE oedema (unclear if patient is on Diamox for history of apparent idiopathic intracranial HTN but patient not compliant with Diamox due to patient's concern with multiple diuretics and past acute kidney injury) with borderline low systolic BP on presentation, with apparent breakthrough complex regional pain syndrome with no relief with Oxycodone IR nor oral Dilaudid and only relief with Dilaudid 1 mg IV in the ER.   Risks/benefits reviewed with patient who agrees with regimen as above, but would consider formal Pain Service evaluation in the AM.      Patient also concerned about her low Mg++ and K+, low Ca++ although suspect primary culprit is the PPI (will stop PPI) and likely her diuretics.   Would consider formal Renal evaluation in the AM.    Patient tolerating and wishes to continue her diet.   Nutritional consult.   Aspiration precautions.  Will provide half dose of patient's preprandial short acting insulin (12U >> 6U) and will provide 0.2U/kg/24 HR for bedtime Lantus (30 U).    Would consider formal Endocrinology evaluation in the AM.    Patient requesting to be seen by Pulmonary in the AM.  Unclear to the etiology of patient's history of severe pulmonary HTN.    Will obtain an echo.    Patient agrees to pharmacologic DVT prophylaxis.   Will obtain B/L venous Duplex to exclude an occult DVT.

## 2024-03-19 NOTE — ED PROVIDER NOTE - ATTENDING APP SHARED VISIT CONTRIBUTION OF CARE
Patient with chronic pain presents with chief complaint of chest pain and hand pain.   Persistent   not better with time or medications  no exertional component  mild distress secondary to pain, non-tachycardic, non-tachypneic, bilateral breath sounds, no rash/vesicles/petechiae, CN 2-12 grossly intact, 5/5 str to bilateral UE and LE, normal sensory to bilateral UE and LE, cooperative, anxious about pain and health  Attending Emergency Medicine Physician- Ziggy Easton MD, FACEP: In this physician's medical judgement based on clinical history and physical exam the patient's signs and symptoms lead to differential diagnoses which includes but is not limited to: acute on chronic pain, acs, paresthesias  Historical features, symptoms, and clinical exam not consistent with: acs  Labs were ordered and independently reviewed by me.  EKG was ordered and independently reviewed by me. Independent interpretation by myself: no stemi or ischemic changes on ekg  Imaging was ordered and reviewed by me.  Independent interpretation by myself: no pneumonia or pneumothorax   Appropriate medications for the patient's presenting complaints were ordered, and effects were reassessed.   Patient's records including prior hospital visit, med and medical history were reviewed.   History assisted by EMS  Will follow up on labs, therapeutics, imaging, reassess and disposition as clinically indicated.  *The above represents an initial assessment/impression. Please refer to my progress notes below for potential changes in patient clinical course*  Escalation to admission/observation was considered.   Emergency Medicine Attending- Dr. Ziggy Easton MD, FACEP: Patient endorsed to Dr. Locke at the time of admission. Based on patient's history and physical exam, as well as the results of today's workup, I feel that patient warrants admission to the hospital for further workup/evaluation and continued management. I discussed the findings of today's workup with the patient and addressed the patient's questions and concerns. The patient was agreeable with admission. Our team spoke with the inpatient receiving team who accepted the patient for admission and subsequently took over the patient's care at the time of admission. The receiving team will follow up on pending labs, analgesia, any clinical imaging results, ancillary findings, reassess, and disposition as clinically indicated. Details of patient and plan conveyed to receiving physician team and conveyed back for understanding. There were no questions at this time about the patient's status, disposition, and plan. Patient's care to be taken over by receiving physician team at this time, all decisions regarding the progression of care will be made at their discretion.   Portions of this note have been documented using voice recognition software. As a result, errors may occur in the transcription process. Effort has been made to correct all grammatical and transcription error, but some may have been missed which may produce sporadic inaccurate transcription or nonsensical phrases.

## 2024-03-19 NOTE — H&P ADULT - PROBLEM SELECTOR PLAN 5
Pt declined CXR she stated overall from her fall she is fine she just does not have any energy. Patient requesting to be seen by Pulmonary in the AM.      Unclear to the etiology of patient's history of severe pulmonary HTN.    Will obtain an echo.

## 2024-03-19 NOTE — H&P ADULT - NSHPSOURCEINFOTX_GEN_ALL_CORE
Reviewed Medex with patient via patient recall from [facility] discharge from Feb 2024.   [place] Stop # [id number] in the chart.

## 2024-03-19 NOTE — H&P ADULT - NSHPADDITIONALINFOADULT_GEN_ALL_CORE
NIGHT HOSPITALIST:    Patient aware of course and agrees with plan/care as above.   Given patient's comorbidities, patient's long term prognosis is guarded.  Emotional support provided to patient.   Care reviewed with covering NP/PA for endorsement to my physician colleagues in the AM.    Jamar Borja MD  Available on [employer] Teams. NIGHT HOSPITALIST:    Patient aware of course and agrees with plan/care as above.   Given patient's comorbidities, patient's long term prognosis is guarded.  Emotional support provided to patient.   Care reviewed with covering NP/JACKSON Aldana for endorsement to my physician colleagues in the AM.    Jamar Borja MD  Available on [employer] Teams.

## 2024-03-19 NOTE — H&P ADULT - PROBLEM SELECTOR PLAN 3
Patient also concerned about her low Mg++ and K+, low Ca++ although suspect primary culprit is the PPI (will stop PPI) and likely her diuretics.       Would consider formal Renal evaluation in the AM.

## 2024-03-20 ENCOUNTER — RESULT REVIEW (OUTPATIENT)
Age: 54
End: 2024-03-20

## 2024-03-20 ENCOUNTER — NON-APPOINTMENT (OUTPATIENT)
Age: 54
End: 2024-03-20

## 2024-03-20 DIAGNOSIS — G89.4 CHRONIC PAIN SYNDROME: ICD-10-CM

## 2024-03-20 DIAGNOSIS — I50.32 CHRONIC DIASTOLIC (CONGESTIVE) HEART FAILURE: ICD-10-CM

## 2024-03-20 DIAGNOSIS — E11.9 TYPE 2 DIABETES MELLITUS WITHOUT COMPLICATIONS: ICD-10-CM

## 2024-03-20 DIAGNOSIS — E83.42 HYPOMAGNESEMIA: ICD-10-CM

## 2024-03-20 DIAGNOSIS — I27.20 PULMONARY HYPERTENSION, UNSPECIFIED: ICD-10-CM

## 2024-03-20 DIAGNOSIS — Z96.82 PRESENCE OF NEUROSTIMULATOR: Chronic | ICD-10-CM

## 2024-03-20 DIAGNOSIS — Z29.9 ENCOUNTER FOR PROPHYLACTIC MEASURES, UNSPECIFIED: ICD-10-CM

## 2024-03-20 LAB
ALBUMIN SERPL ELPH-MCNC: 3.7 G/DL — SIGNIFICANT CHANGE UP (ref 3.3–5)
ALP SERPL-CCNC: 124 U/L — HIGH (ref 40–120)
ALT FLD-CCNC: 21 U/L — SIGNIFICANT CHANGE UP (ref 10–45)
ANION GAP SERPL CALC-SCNC: 15 MMOL/L — SIGNIFICANT CHANGE UP (ref 5–17)
AST SERPL-CCNC: 27 U/L — SIGNIFICANT CHANGE UP (ref 10–40)
BILIRUB SERPL-MCNC: 0.7 MG/DL — SIGNIFICANT CHANGE UP (ref 0.2–1.2)
BUN SERPL-MCNC: 11 MG/DL — SIGNIFICANT CHANGE UP (ref 7–23)
CALCIUM SERPL-MCNC: 6.9 MG/DL — LOW (ref 8.4–10.5)
CHLORIDE SERPL-SCNC: 97 MMOL/L — SIGNIFICANT CHANGE UP (ref 96–108)
CO2 SERPL-SCNC: 27 MMOL/L — SIGNIFICANT CHANGE UP (ref 22–31)
CREAT SERPL-MCNC: 0.75 MG/DL — SIGNIFICANT CHANGE UP (ref 0.5–1.3)
EGFR: 95 ML/MIN/1.73M2 — SIGNIFICANT CHANGE UP
GLUCOSE BLDC GLUCOMTR-MCNC: 135 MG/DL — HIGH (ref 70–99)
GLUCOSE BLDC GLUCOMTR-MCNC: 201 MG/DL — HIGH (ref 70–99)
GLUCOSE BLDC GLUCOMTR-MCNC: 280 MG/DL — HIGH (ref 70–99)
GLUCOSE BLDC GLUCOMTR-MCNC: 405 MG/DL — HIGH (ref 70–99)
GLUCOSE BLDC GLUCOMTR-MCNC: 411 MG/DL — HIGH (ref 70–99)
GLUCOSE SERPL-MCNC: 210 MG/DL — HIGH (ref 70–99)
HCG SERPL-ACNC: <2 MIU/ML — SIGNIFICANT CHANGE UP
HCT VFR BLD CALC: 34.2 % — LOW (ref 34.5–45)
HGB BLD-MCNC: 10.8 G/DL — LOW (ref 11.5–15.5)
MAGNESIUM SERPL-MCNC: 1.3 MG/DL — LOW (ref 1.6–2.6)
MAGNESIUM SERPL-MCNC: 1.6 MG/DL — SIGNIFICANT CHANGE UP (ref 1.6–2.6)
MCHC RBC-ENTMCNC: 29.3 PG — SIGNIFICANT CHANGE UP (ref 27–34)
MCHC RBC-ENTMCNC: 31.6 GM/DL — LOW (ref 32–36)
MCV RBC AUTO: 92.9 FL — SIGNIFICANT CHANGE UP (ref 80–100)
NRBC # BLD: 0 /100 WBCS — SIGNIFICANT CHANGE UP (ref 0–0)
PHOSPHATE SERPL-MCNC: 4.8 MG/DL — HIGH (ref 2.5–4.5)
PLATELET # BLD AUTO: 455 K/UL — HIGH (ref 150–400)
POTASSIUM SERPL-MCNC: 3.6 MMOL/L — SIGNIFICANT CHANGE UP (ref 3.5–5.3)
POTASSIUM SERPL-SCNC: 3.6 MMOL/L — SIGNIFICANT CHANGE UP (ref 3.5–5.3)
PROT SERPL-MCNC: 7.5 G/DL — SIGNIFICANT CHANGE UP (ref 6–8.3)
RBC # BLD: 3.68 M/UL — LOW (ref 3.8–5.2)
RBC # FLD: 13.4 % — SIGNIFICANT CHANGE UP (ref 10.3–14.5)
SODIUM SERPL-SCNC: 139 MMOL/L — SIGNIFICANT CHANGE UP (ref 135–145)
WBC # BLD: 9.3 K/UL — SIGNIFICANT CHANGE UP (ref 3.8–10.5)
WBC # FLD AUTO: 9.3 K/UL — SIGNIFICANT CHANGE UP (ref 3.8–10.5)

## 2024-03-20 PROCEDURE — 93970 EXTREMITY STUDY: CPT | Mod: 26

## 2024-03-20 PROCEDURE — 99233 SBSQ HOSP IP/OBS HIGH 50: CPT

## 2024-03-20 PROCEDURE — 99222 1ST HOSP IP/OBS MODERATE 55: CPT

## 2024-03-20 PROCEDURE — 93306 TTE W/DOPPLER COMPLETE: CPT | Mod: 26

## 2024-03-20 RX ORDER — INSULIN GLARGINE 100 [IU]/ML
30 INJECTION, SOLUTION SUBCUTANEOUS AT BEDTIME
Refills: 0 | Status: DISCONTINUED | OUTPATIENT
Start: 2024-03-20 | End: 2024-03-21

## 2024-03-20 RX ORDER — OXYCODONE HYDROCHLORIDE 5 MG/1
10 TABLET ORAL EVERY 12 HOURS
Refills: 0 | Status: DISCONTINUED | OUTPATIENT
Start: 2024-03-20 | End: 2024-03-26

## 2024-03-20 RX ORDER — BUMETANIDE 0.25 MG/ML
2 INJECTION INTRAMUSCULAR; INTRAVENOUS DAILY
Refills: 0 | Status: DISCONTINUED | OUTPATIENT
Start: 2024-03-20 | End: 2024-03-20

## 2024-03-20 RX ORDER — CALCIUM CARBONATE 500(1250)
1 TABLET ORAL
Refills: 0 | Status: DISCONTINUED | OUTPATIENT
Start: 2024-03-20 | End: 2024-03-22

## 2024-03-20 RX ORDER — GLUCAGON INJECTION, SOLUTION 0.5 MG/.1ML
1 INJECTION, SOLUTION SUBCUTANEOUS ONCE
Refills: 0 | Status: DISCONTINUED | OUTPATIENT
Start: 2024-03-20 | End: 2024-03-26

## 2024-03-20 RX ORDER — POTASSIUM CHLORIDE 20 MEQ
40 PACKET (EA) ORAL ONCE
Refills: 0 | Status: COMPLETED | OUTPATIENT
Start: 2024-03-20 | End: 2024-03-20

## 2024-03-20 RX ORDER — DEXTROSE 50 % IN WATER 50 %
25 SYRINGE (ML) INTRAVENOUS ONCE
Refills: 0 | Status: DISCONTINUED | OUTPATIENT
Start: 2024-03-20 | End: 2024-03-26

## 2024-03-20 RX ORDER — HYDROMORPHONE HYDROCHLORIDE 2 MG/ML
1 INJECTION INTRAMUSCULAR; INTRAVENOUS; SUBCUTANEOUS EVERY 4 HOURS
Refills: 0 | Status: DISCONTINUED | OUTPATIENT
Start: 2024-03-20 | End: 2024-03-20

## 2024-03-20 RX ORDER — DEXTROSE 50 % IN WATER 50 %
12.5 SYRINGE (ML) INTRAVENOUS ONCE
Refills: 0 | Status: DISCONTINUED | OUTPATIENT
Start: 2024-03-20 | End: 2024-03-26

## 2024-03-20 RX ORDER — DEXTROSE 50 % IN WATER 50 %
15 SYRINGE (ML) INTRAVENOUS ONCE
Refills: 0 | Status: DISCONTINUED | OUTPATIENT
Start: 2024-03-20 | End: 2024-03-26

## 2024-03-20 RX ORDER — POTASSIUM CHLORIDE 20 MEQ
40 PACKET (EA) ORAL ONCE
Refills: 0 | Status: DISCONTINUED | OUTPATIENT
Start: 2024-03-20 | End: 2024-03-20

## 2024-03-20 RX ORDER — POLYETHYLENE GLYCOL 3350 17 G/17G
17 POWDER, FOR SOLUTION ORAL AT BEDTIME
Refills: 0 | Status: DISCONTINUED | OUTPATIENT
Start: 2024-03-20 | End: 2024-03-22

## 2024-03-20 RX ORDER — SODIUM CHLORIDE 9 MG/ML
1000 INJECTION, SOLUTION INTRAVENOUS
Refills: 0 | Status: DISCONTINUED | OUTPATIENT
Start: 2024-03-20 | End: 2024-03-26

## 2024-03-20 RX ORDER — INSULIN LISPRO 100/ML
VIAL (ML) SUBCUTANEOUS AT BEDTIME
Refills: 0 | Status: DISCONTINUED | OUTPATIENT
Start: 2024-03-20 | End: 2024-03-21

## 2024-03-20 RX ORDER — INSULIN LISPRO 100/ML
VIAL (ML) SUBCUTANEOUS
Refills: 0 | Status: DISCONTINUED | OUTPATIENT
Start: 2024-03-20 | End: 2024-03-21

## 2024-03-20 RX ORDER — TIZANIDINE 4 MG/1
4 TABLET ORAL EVERY 6 HOURS
Refills: 0 | Status: DISCONTINUED | OUTPATIENT
Start: 2024-03-20 | End: 2024-03-26

## 2024-03-20 RX ORDER — LISINOPRIL 2.5 MG/1
5 TABLET ORAL DAILY
Refills: 0 | Status: DISCONTINUED | OUTPATIENT
Start: 2024-03-20 | End: 2024-03-21

## 2024-03-20 RX ORDER — LANOLIN ALCOHOL/MO/W.PET/CERES
5 CREAM (GRAM) TOPICAL AT BEDTIME
Refills: 0 | Status: DISCONTINUED | OUTPATIENT
Start: 2024-03-20 | End: 2024-03-26

## 2024-03-20 RX ORDER — ATORVASTATIN CALCIUM 80 MG/1
20 TABLET, FILM COATED ORAL AT BEDTIME
Refills: 0 | Status: DISCONTINUED | OUTPATIENT
Start: 2024-03-20 | End: 2024-03-26

## 2024-03-20 RX ORDER — INSULIN LISPRO 100/ML
6 VIAL (ML) SUBCUTANEOUS
Refills: 0 | Status: DISCONTINUED | OUTPATIENT
Start: 2024-03-20 | End: 2024-03-21

## 2024-03-20 RX ORDER — OXYCODONE HYDROCHLORIDE 5 MG/1
5 TABLET ORAL EVERY 4 HOURS
Refills: 0 | Status: DISCONTINUED | OUTPATIENT
Start: 2024-03-20 | End: 2024-03-26

## 2024-03-20 RX ORDER — SENNA PLUS 8.6 MG/1
2 TABLET ORAL AT BEDTIME
Refills: 0 | Status: DISCONTINUED | OUTPATIENT
Start: 2024-03-20 | End: 2024-03-26

## 2024-03-20 RX ORDER — MAGNESIUM SULFATE 500 MG/ML
2 VIAL (ML) INJECTION ONCE
Refills: 0 | Status: COMPLETED | OUTPATIENT
Start: 2024-03-20 | End: 2024-03-20

## 2024-03-20 RX ORDER — ACETAMINOPHEN 500 MG
1000 TABLET ORAL ONCE
Refills: 0 | Status: COMPLETED | OUTPATIENT
Start: 2024-03-20 | End: 2024-03-20

## 2024-03-20 RX ORDER — ENOXAPARIN SODIUM 100 MG/ML
40 INJECTION SUBCUTANEOUS EVERY 12 HOURS
Refills: 0 | Status: DISCONTINUED | OUTPATIENT
Start: 2024-03-20 | End: 2024-03-26

## 2024-03-20 RX ORDER — NALOXEGOL OXALATE 12.5 MG/1
25 TABLET, FILM COATED ORAL DAILY
Refills: 0 | Status: DISCONTINUED | OUTPATIENT
Start: 2024-03-20 | End: 2024-03-26

## 2024-03-20 RX ORDER — HYDROMORPHONE HYDROCHLORIDE 2 MG/ML
1 INJECTION INTRAMUSCULAR; INTRAVENOUS; SUBCUTANEOUS ONCE
Refills: 0 | Status: DISCONTINUED | OUTPATIENT
Start: 2024-03-20 | End: 2024-03-20

## 2024-03-20 RX ORDER — FAMOTIDINE 10 MG/ML
20 INJECTION INTRAVENOUS DAILY
Refills: 0 | Status: DISCONTINUED | OUTPATIENT
Start: 2024-03-20 | End: 2024-03-26

## 2024-03-20 RX ORDER — OXYCODONE HYDROCHLORIDE 5 MG/1
10 TABLET ORAL EVERY 4 HOURS
Refills: 0 | Status: DISCONTINUED | OUTPATIENT
Start: 2024-03-20 | End: 2024-03-26

## 2024-03-20 RX ORDER — CARVEDILOL PHOSPHATE 80 MG/1
3.12 CAPSULE, EXTENDED RELEASE ORAL EVERY 12 HOURS
Refills: 0 | Status: DISCONTINUED | OUTPATIENT
Start: 2024-03-20 | End: 2024-03-26

## 2024-03-20 RX ADMIN — HYDROMORPHONE HYDROCHLORIDE 1 MILLIGRAM(S): 2 INJECTION INTRAMUSCULAR; INTRAVENOUS; SUBCUTANEOUS at 16:47

## 2024-03-20 RX ADMIN — Medication 100 MILLIGRAM(S): at 23:58

## 2024-03-20 RX ADMIN — HYDROMORPHONE HYDROCHLORIDE 1 MILLIGRAM(S): 2 INJECTION INTRAMUSCULAR; INTRAVENOUS; SUBCUTANEOUS at 05:56

## 2024-03-20 RX ADMIN — Medication 5 MILLIGRAM(S): at 22:49

## 2024-03-20 RX ADMIN — HYDROMORPHONE HYDROCHLORIDE 1 MILLIGRAM(S): 2 INJECTION INTRAMUSCULAR; INTRAVENOUS; SUBCUTANEOUS at 10:47

## 2024-03-20 RX ADMIN — Medication 4: at 21:42

## 2024-03-20 RX ADMIN — ENOXAPARIN SODIUM 40 MILLIGRAM(S): 100 INJECTION SUBCUTANEOUS at 06:52

## 2024-03-20 RX ADMIN — CARVEDILOL PHOSPHATE 3.12 MILLIGRAM(S): 80 CAPSULE, EXTENDED RELEASE ORAL at 17:31

## 2024-03-20 RX ADMIN — Medication 25 GRAM(S): at 01:42

## 2024-03-20 RX ADMIN — HYDROMORPHONE HYDROCHLORIDE 1 MILLIGRAM(S): 2 INJECTION INTRAMUSCULAR; INTRAVENOUS; SUBCUTANEOUS at 01:40

## 2024-03-20 RX ADMIN — OXYCODONE HYDROCHLORIDE 10 MILLIGRAM(S): 5 TABLET ORAL at 20:39

## 2024-03-20 RX ADMIN — TIZANIDINE 4 MILLIGRAM(S): 4 TABLET ORAL at 21:32

## 2024-03-20 RX ADMIN — Medication 1 TABLET(S): at 20:04

## 2024-03-20 RX ADMIN — Medication 25 GRAM(S): at 13:00

## 2024-03-20 RX ADMIN — FAMOTIDINE 20 MILLIGRAM(S): 10 INJECTION INTRAVENOUS at 13:25

## 2024-03-20 RX ADMIN — LISINOPRIL 5 MILLIGRAM(S): 2.5 TABLET ORAL at 05:57

## 2024-03-20 RX ADMIN — OXYCODONE HYDROCHLORIDE 10 MILLIGRAM(S): 5 TABLET ORAL at 15:27

## 2024-03-20 RX ADMIN — HYDROMORPHONE HYDROCHLORIDE 1 MILLIGRAM(S): 2 INJECTION INTRAMUSCULAR; INTRAVENOUS; SUBCUTANEOUS at 06:52

## 2024-03-20 RX ADMIN — Medication 40 MILLIEQUIVALENT(S): at 13:00

## 2024-03-20 RX ADMIN — INSULIN GLARGINE 30 UNIT(S): 100 INJECTION, SOLUTION SUBCUTANEOUS at 21:57

## 2024-03-20 RX ADMIN — ATORVASTATIN CALCIUM 20 MILLIGRAM(S): 80 TABLET, FILM COATED ORAL at 22:49

## 2024-03-20 RX ADMIN — Medication 2: at 13:26

## 2024-03-20 RX ADMIN — Medication 200 MILLIGRAM(S): at 05:56

## 2024-03-20 RX ADMIN — Medication 400 MILLIGRAM(S): at 13:00

## 2024-03-20 RX ADMIN — Medication 200 MILLIGRAM(S): at 22:49

## 2024-03-20 RX ADMIN — POLYETHYLENE GLYCOL 3350 17 GRAM(S): 17 POWDER, FOR SOLUTION ORAL at 22:48

## 2024-03-20 RX ADMIN — Medication 200 MILLIGRAM(S): at 15:26

## 2024-03-20 RX ADMIN — CARVEDILOL PHOSPHATE 3.12 MILLIGRAM(S): 80 CAPSULE, EXTENDED RELEASE ORAL at 05:57

## 2024-03-20 RX ADMIN — TIZANIDINE 4 MILLIGRAM(S): 4 TABLET ORAL at 17:31

## 2024-03-20 RX ADMIN — ENOXAPARIN SODIUM 40 MILLIGRAM(S): 100 INJECTION SUBCUTANEOUS at 17:32

## 2024-03-20 RX ADMIN — SENNA PLUS 2 TABLET(S): 8.6 TABLET ORAL at 22:49

## 2024-03-20 RX ADMIN — Medication 6 UNIT(S): at 12:30

## 2024-03-20 NOTE — PATIENT PROFILE ADULT - FALL HARM RISK - HARM RISK INTERVENTIONS
Assistance with ambulation/Assistance OOB with selected safe patient handling equipment/Communicate Risk of Fall with Harm to all staff/Discuss with provider need for PT consult/Monitor gait and stability/Orthostatic vital signs/Provide patient with walking aids - walker, cane, crutches/Reinforce activity limits and safety measures with patient and family/Tailored Fall Risk Interventions/Visual Cue: Yellow wristband and red socks/Bed in lowest position, wheels locked, appropriate side rails in place/Call bell, personal items and telephone in reach/Instruct patient to call for assistance before getting out of bed or chair/Non-slip footwear when patient is out of bed/[place] to call system/Physically safe environment - no spills, clutter or unnecessary equipment/Purposeful Proactive Rounding/Room/bathroom lighting operational, light cord in reach

## 2024-03-20 NOTE — PROGRESS NOTE ADULT - ASSESSMENT
54 y/o F with PMH of CHF, HTN, HLD, DM, Idiopathic intracranial hypertension (diagnosed 2020), chronic right sided back pain (intercostal neuralgia/complex regional pain syndrome) s/p spinal stimulator p/w poorly controlled back pain.

## 2024-03-20 NOTE — PHYSICAL THERAPY INITIAL EVALUATION ADULT - PERTINENT HX OF CURRENT PROBLEM, REHAB EVAL
54 y/o F with BMI 53.4, history of apparent HF with preserved EF, S/P past cardiac cath with normal coronaries but undifferentiated severe pulmonary HTN, with patient on multiple diuretic regimens (Bumex, Diamox, Aldactone) for chronic lower leg oedema, type 2 DM on metformin but also preprandial Novolog insulin 12 U and Tresiba 45 units at bedtime, essential HTN on Coreg, lisinopril, presumed GERD on a PPI, idiopathic intracranial HTN (diagnosed 2020), complex regional pain syndrome with past spinal implant placed at [facility] in 2022 with complications with multifocal pneumonia in July 2022 with acute respiratory failure, complicated with  ARDS and with BiPAP>intubation/extubation at [facility], Oct 2022 at [facility] with dyspnoea and B/L LE oedema with apparent diastolic HF presentation, referral for elective left and right heart cath at [facility] in Nov 2022 with normal coronaries but with severe pulmonary HTN on RHC, Dec 2023 with admission at [facility] for HA with nondiagnostic CTT head, MRI precluded due to patient's spinal implant, with patient seen by Neuro and S/P IR LP at the time with diagnosis of idiopathic intracranial HTN (presumably maintained on Diamox PRN?), past chronic kidney injury, with patient self referring to [facility] following referral by her Pain Consultant above following no relief with short course of oral Dilaudid 4mg with no relief of Oxycodone IR titrated to 20 mg (see [place] I Stop).  Review of patient's admission at [facility] from discharge from Feb 2024 with patient seen by Pain Service, with partial relief with 0.5 mg IV Dilaudid.   Patient with relief with Dilaudid 1 mg IV in the ER (had received IV ketamine by the ER attending earlier).  Patient notes diffuse body pain, centered on the RIGHT trunk, chest pain.   NO chest pain/pressure at present.  NO dyspnoea, no cough, no wheezing.   NO HA, no focal weakness.  NO N/V.   Patient tolerating and wishes PO intake.

## 2024-03-20 NOTE — CONSULT NOTE ADULT - SUBJECTIVE AND OBJECTIVE BOX
Chief Complaint:  Patient is a 53y old  Female who presents with a chief complaint of Diffuse body pain, chest pain since yesterday (20 Mar 2024 15:15)    HPI:  54 y/o F with BMI 53.4, history - HF with preserved EF, S/P past cardiac cath with normal coronaries but undifferentiated severe pulmonary HTN, multiple diuretic regimens (Bumex, Diamox, Aldactone) for chronic lower leg edema, type 2 DM, essential HTN, presumed GERD, idiopathic intracranial HTN (diagnosed 2020), complex regional pain syndrome with past spinal cord stimulator implant placed at [facility] with complications of multifocal PNA 7/2022 w/ acute respiratory failure, ARDS and with BiPAP>intubation/extubation at [facility], Oct 2022 at [facility] with dyspnoea and B/L LE oedema with apparent diastolic HF presentation, referral for elective left and right heart cath at [place] in Nov 2022 with normal coronaries but with severe pulmonary HTN on RHC, Dec 2023 with admission at [facility] for HA with nondiagnostic CTT head, MRI precluded due to patient's spinal implant, with patient seen by Neuro and S/P IR LP at the time with diagnosis of idiopathic intracranial HTN (presumably maintained on Diamox PRN?), past chronic kidney injury.    Comes to [facility] for diffuse body pain, centered on the RIGHT trunk, partial relief with IV Dilaudid, received IV ketamine by the ER attending earlier. (19 Mar 2024 23:37)    Current out- patient pain regimen: Tizanidine 2 mg Q6h, a couple of recent short Rxs for Dilaudid 4 mg and Lyrica 100 mg however historically pt has been on varied doses - most recent Oxy 15 and 20 mg BID and Lyrica 100 Q6h and TID    Out Patient Pain Management provider: None    [facility] Prescription Monitoring Program: Reference #[id number]    Opioid Risk Tool (ORT-OUD) Score: Low    Pain Score: 10/10    Pt seen in ED, appears mostly comfortable but grimacing in pain at times. Pt w/ SCS for intercostal neuralgia (placed at [facility] by Dr. Perdomo "but he punctured my lung and I ended up here in the ICU so I won't go back") but also w/ chronic right mid to upper back pain that she describes as "someone kicking me in 3 places at once", and stabbing pain, endorses tightness and muscle spasm. Lyrica helps w/ chronic peripheral neuropathy of B/L LE but not w/ back pain. Reports having ESIs in past w/ no effect. Speech is loquacious and circumstantial, difficult to ascertain why she has not followed up w/ pain management (Yakov Underwood stopped the opioids, my doctors won't prescribe for me, etc.). Pt upset that she is being given 1 mg IV Dilaudid when she takes 4 mg at home - educated re: different dosing between IV and PO as well as benefits of PO over IV opioids; need to find an effective oral regimen in preparation for discharge; goal of pain management to find effective oral regimen so that pain is tolerable and patient can function.      REVIEW OF SYSTEMS:  CONSTITUTIONAL: No fever, weight loss, fatigue, falls  NEURO: No headaches, memory loss, loss of strength, tremors, dizziness or blurred vision  RESP: No shortness of breath, cough  CV: No chest pain, palpitations  GI: No abdominal pain, nausea, vomiting, diarrhea, constipation, incontinence  : No urinary incontinence/retention (except loses control when she cannot move fast enough to get to bathroom in time)  MSK: No upper or lower motor strength weakness  SKIN: No itching, burning, rashes, or lesions   PSYCHIATRIC: No depression, anxiety, mood swings, or difficulty sleeping      PHYSICAL EXAM  GENERAL: Seen at bedside, well-groomed, obese, appears stated age, no signs of toxicity  NEURO: Alert & Oriented X3, Good concentration; Follows commands   HEENT: Head atraumatic, normocephalic; speech clear and fluent  GI: Appetite good, (+)BM  : Voiding   EXTREMITIES:  moving all extremities, ambulates w/ walker   SKIN: No rashes or lesions  PSYCH: affect normal; good eye contact; appears anxious    PAST MEDICAL & SURGICAL HISTORY:  Diabetes      HTN (hypertension)      Neuropathy      Obesity      Former cigarette smoker  (smoked x 45 years; quit ~2013)      Marijuana smoker, continuous  (states smokes 3 - 4 times per day for pain management reasons)      Neuralgia  (pt states hx/o "intercostal neuralgia")      Cardiac abnormality  (pt states hx/o "cardiac event"; is unclear on diagnosis; denies hx/o MI)      Diabetes      Essential hypertension      IBS (irritable bowel syndrome)      Complex regional pain syndrome type 1      History of cholecystectomy      History of hand surgery  (pt states she had 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 1000 milliLiter(s) (100 mL/Hr) IV Continuous <Continuous>  dextrose 5%. 1000 milliLiter(s) (50 mL/Hr) IV Continuous <Continuous>  dextrose 50% Injectable 25 Gram(s) IV Push once  dextrose 50% Injectable 12.5 Gram(s) IV Push once  dextrose 50% Injectable 25 Gram(s) IV Push once  enoxaparin Injectable 40 milliGRAM(s) SubCutaneous every 12 hours  famotidine    Tablet 20 milliGRAM(s) Oral daily  glucagon  Injectable 1 milliGRAM(s) IntraMuscular once  insulin glargine Injectable (LANTUS) 30 Unit(s) SubCutaneous at bedtime  insulin lispro (ADMELOG) corrective regimen sliding scale   SubCutaneous three times a day before meals  insulin lispro (ADMELOG) corrective regimen sliding scale   SubCutaneous at bedtime  insulin lispro Injectable (ADMELOG) 6 Unit(s) SubCutaneous three times a day before meals  lisinopril 5 milliGRAM(s) Oral daily  naloxegol 25 milliGRAM(s) Oral daily  oxyCODONE  ER Tablet 10 milliGRAM(s) Oral every 12 hours  polyethylene glycol 3350 17 Gram(s) Oral at bedtime  pregabalin 200 milliGRAM(s) Oral three times a day  senna 2 Tablet(s) Oral at bedtime  tiZANidine 4 milliGRAM(s) Oral every 6 hours    MEDICATIONS  (PRN):  benzonatate 100 milliGRAM(s) Oral three times a day PRN Cough  dextrose Oral Gel 15 Gram(s) Oral once PRN Blood Glucose LESS THAN 70 milliGRAM(s)/deciliter  melatonin 5 milliGRAM(s) Oral at bedtime PRN Sleep      Vital Signs:  T(C): 36.6 (03-20-24 @ 12:23)  HR: 69 (03-20-24 @ 12:23)  BP: 104/66 (03-20-24 @ 12:23)  RR: 18 (03-20-24 @ 12:23)  SpO2: 94% (03-20-24 @ 12:23)    Pertinent labs/radiology:  Reviewed                          10.8   9.30  )-----------( 455      ( 20 Mar 2024 11:47 )             34.2       03-20    139  |  97  |  11  ----------------------------<  210<H>  3.6   |  27  |  0.75    Ca    6.9<L>      20 Mar 2024 11:47  Phos  4.8     03-20  Mg     1.6     03-20    TPro  7.5  /  Alb  3.7  /  TBili  0.7  /  DBili  x   /  AST  27  /  ALT  21  /  AlkPhos  124<H>  03-20

## 2024-03-20 NOTE — PROGRESS NOTE ADULT - PROBLEM SELECTOR PLAN 2
TTE: EF 65%, normal diastolic function, no singificant changes compared to prior echo.   Holding diuretics for today

## 2024-03-20 NOTE — PROGRESS NOTE ADULT - SUBJECTIVE AND OBJECTIVE BOX
SUBJECTIVE / OVERNIGHT EVENTS:  Pt seen and evaluated at bedside. Still with back pain. Denies any fevers, chills, sob, n/v, abd pain.       MEDICATIONS  (STANDING):  atorvastatin 20 milliGRAM(s) Oral at bedtime  carvedilol 3.125 milliGRAM(s) Oral every 12 hours  dextrose 5%. 1000 milliLiter(s) (100 mL/Hr) IV Continuous <Continuous>  dextrose 5%. 1000 milliLiter(s) (50 mL/Hr) IV Continuous <Continuous>  dextrose 50% Injectable 25 Gram(s) IV Push once  dextrose 50% Injectable 12.5 Gram(s) IV Push once  dextrose 50% Injectable 25 Gram(s) IV Push once  enoxaparin Injectable 40 milliGRAM(s) SubCutaneous every 12 hours  famotidine    Tablet 20 milliGRAM(s) Oral daily  glucagon  Injectable 1 milliGRAM(s) IntraMuscular once  HYDROmorphone  Injectable 1 milliGRAM(s) IV Push once  insulin glargine Injectable (LANTUS) 30 Unit(s) SubCutaneous at bedtime  insulin lispro (ADMELOG) corrective regimen sliding scale   SubCutaneous three times a day before meals  insulin lispro (ADMELOG) corrective regimen sliding scale   SubCutaneous at bedtime  insulin lispro Injectable (ADMELOG) 6 Unit(s) SubCutaneous three times a day before meals  lisinopril 5 milliGRAM(s) Oral daily  naloxegol 25 milliGRAM(s) Oral daily  oxyCODONE  ER Tablet 10 milliGRAM(s) Oral every 12 hours  pregabalin 200 milliGRAM(s) Oral three times a day  tiZANidine 4 milliGRAM(s) Oral every 6 hours    MEDICATIONS  (PRN):  benzonatate 100 milliGRAM(s) Oral three times a day PRN Cough  dextrose Oral Gel 15 Gram(s) Oral once PRN Blood Glucose LESS THAN 70 milliGRAM(s)/deciliter  melatonin 5 milliGRAM(s) Oral at bedtime PRN Sleep      I&O's Summary    19 Mar 2024 07:01  -  20 Mar 2024 07:00  --------------------------------------------------------  IN: 250 mL / OUT: 750 mL / NET: -500 mL    20 Mar 2024 07:01  -  20 Mar 2024 15:16  --------------------------------------------------------  IN: 250 mL / OUT: 0 mL / NET: 250 mL        PHYSICAL EXAM:  Vital Signs Last 24 Hrs  T(C): 36.6 (20 Mar 2024 12:23), Max: 36.9 (20 Mar 2024 00:31)  T(F): 97.9 (20 Mar 2024 12:23), Max: 98.5 (20 Mar 2024 00:31)  HR: 69 (20 Mar 2024 12:23) (69 - 72)  BP: 104/66 (20 Mar 2024 12:23) (104/66 - 134/82)  BP(mean): --  RR: 18 (20 Mar 2024 12:23) (18 - 22)  SpO2: 94% (20 Mar 2024 12:23) (94% - 96%)    Parameters below as of 20 Mar 2024 12:23  Patient On (Oxygen Delivery Method): room air      CONSTITUTIONAL: NAD  EYES: EOMI; conjunctiva and sclera clear  ENMT: Moist oral mucosa, no pharyngeal injection or exudates  NECK: Supple, trachea midline   RESPIRATORY: Normal respiratory effort; lungs are clear to auscultation bilaterally  CARDIOVASCULAR: Regular rate and rhythm, normal S1 and S2  ABDOMEN: Nontender to palpation, normoactive bowel sounds, no rebound/guarding  MUSCULOSKELETAL: moves all extremities   PSYCH: A+O to person, place, and time  NEUROLOGY: grossly nonfocal       LABS:                        10.8   9.30  )-----------( 455      ( 20 Mar 2024 11:47 )             34.2     03-20    139  |  97  |  11  ----------------------------<  210<H>  3.6   |  27  |  0.75    Ca    6.9<L>      20 Mar 2024 11:47  Phos  4.8     03-20  Mg     1.6     03-20    TPro  7.5  /  Alb  3.7  /  TBili  0.7  /  DBili  x   /  AST  27  /  ALT  21  /  AlkPhos  124<H>  03-20          Urinalysis Basic - ( 20 Mar 2024 11:47 )    Color: x / Appearance: x / SG: x / pH: x  Gluc: 210 mg/dL / Ketone: x  / Bili: x / Urobili: x   Blood: x / Protein: x / Nitrite: x   Leuk Esterase: x / RBC: x / WBC x   Sq Epi: x / Non Sq Epi: x / Bacteria: x        SARS-CoV-2: NotDetec (08 Dec 2023 01:28)

## 2024-03-20 NOTE — CONSULT NOTE ADULT - ASSESSMENT
54 y/o F with BMI 53.4, history - HF with preserved EF, S/P past cardiac cath with normal coronaries but undifferentiated severe pulmonary HTN, multiple diuretic regimens (Bumex, Diamox, Aldactone) for chronic lower leg edema, type 2 DM, essential HTN, presumed GERD, idiopathic intracranial HTN (diagnosed 2020), complex regional pain syndrome with past spinal cord stimulator implant placed at [facility] with complications of multifocal PNA 7/2022 w/ acute respiratory failure, ARDS and with BiPAP>intubation/extubation at [facility], Oct 2022 at [facility] with dyspnoea and B/L LE oedema with apparent diastolic HF presentation, referral for elective left and right heart cath at [place] in Nov 2022 with normal coronaries but with severe pulmonary HTN on RHC, Dec 2023 with admission at [facility] for HA with nondiagnostic CTT head, MRI precluded due to patient's spinal implant, with patient seen by Neuro and S/P IR LP at the time with diagnosis of idiopathic intracranial HTN (presumably maintained on Diamox PRN?), past chronic kidney injury.    Comes to [facility] for diffuse body pain, centered on the RIGHT trunk, partial relief with IV Dilaudid, received IV ketamine by the ER attending earlier. (19 Mar 2024 23:37)    Current out- patient pain regimen: Tizanidine 2 mg Q6h, a couple of recent short Rxs for Dilaudid 4 mg and Lyrica 100 mg however historically pt has been on varied doses - most recent Oxy 15 and 20 mg BID and Lyrica 100 Q6h and TID    Out Patient Pain Management provider: None    Pt w/ SCS for intercostal neuralgia but also w/ chronic right mid to upper back pain that she describes as "someone kicking me in 3 places at once", and stabbing pain, endorses tightness and muscle spasm. Lyrica helps w/ chronic peripheral neuropathy of B/L LE but not w/ back pain. Reports having ESIs in past w/ no effect. Speech is loquacious and circumstantial, difficult to ascertain why she has not followed up w/ pain management.    Plan discussed with primary team:  Start OxyContin 10 mg BID  Start Oxycodone 5 mg every 4 hrs PRN moderate pain and 10 mg every 4 hrs PRN severe pain  Tizanidine 4 mg every 6 hrs  Continue Lyrica 200 mg TID as ordered (CrCl = 215.9)    Patient has trialed and failed Cymbalta and Amitriptyline in the past  Lidoderm  Warm/cool packs for comfort  Bowel Regimen  Incentive Spirometer  PT per primary team  Monitor for sedation, respiratory depression  Out-patient pain practice list provided for pain management after discharge, strongly encouraged  Narcan Rescue Kit on discharge (Naloxone 4 mg/0.1 ml nasal spray - 1 spray q 2-3 minutes alternating between nostrils)    Time spent on encounter:    65    Minutes    Chronic Pain Service  [phone]

## 2024-03-20 NOTE — PHYSICAL THERAPY INITIAL EVALUATION ADULT - ADDITIONAL COMMENTS
Pt lives alone in an apartment, no steps required. Pt's daughter is her CDPAP 4-6 hours a day, 7 days a week. Pt was mostly independent with all functional mobility with a rollator prior to admission, daughter helps with ADL's as needed. Pt also owns a wheelchair and a shower chair.

## 2024-03-21 ENCOUNTER — APPOINTMENT (OUTPATIENT)
Dept: CT IMAGING | Facility: HOSPITAL | Age: 54
End: 2024-03-21

## 2024-03-21 LAB
ALBUMIN SERPL ELPH-MCNC: 3.9 G/DL — SIGNIFICANT CHANGE UP (ref 3.3–5)
ALP SERPL-CCNC: 130 U/L — HIGH (ref 40–120)
ALT FLD-CCNC: 20 U/L — SIGNIFICANT CHANGE UP (ref 10–45)
ANION GAP SERPL CALC-SCNC: 15 MMOL/L — SIGNIFICANT CHANGE UP (ref 5–17)
APPEARANCE UR: CLEAR — SIGNIFICANT CHANGE UP
AST SERPL-CCNC: 14 U/L — SIGNIFICANT CHANGE UP (ref 10–40)
BACTERIA # UR AUTO: ABNORMAL /HPF
BASOPHILS # BLD AUTO: 0.02 K/UL — SIGNIFICANT CHANGE UP (ref 0–0.2)
BASOPHILS NFR BLD AUTO: 0.3 % — SIGNIFICANT CHANGE UP (ref 0–2)
BILIRUB SERPL-MCNC: 0.6 MG/DL — SIGNIFICANT CHANGE UP (ref 0.2–1.2)
BILIRUB UR-MCNC: NEGATIVE — SIGNIFICANT CHANGE UP
BUN SERPL-MCNC: 14 MG/DL — SIGNIFICANT CHANGE UP (ref 7–23)
CALCIUM SERPL-MCNC: 7.2 MG/DL — LOW (ref 8.4–10.5)
CALCIUM SERPL-MCNC: 7.4 MG/DL — LOW (ref 8.4–10.5)
CAST: 1 /LPF — SIGNIFICANT CHANGE UP (ref 0–4)
CHLORIDE SERPL-SCNC: 95 MMOL/L — LOW (ref 96–108)
CO2 SERPL-SCNC: 24 MMOL/L — SIGNIFICANT CHANGE UP (ref 22–31)
COLOR SPEC: YELLOW — SIGNIFICANT CHANGE UP
CREAT SERPL-MCNC: 0.92 MG/DL — SIGNIFICANT CHANGE UP (ref 0.5–1.3)
DIFF PNL FLD: NEGATIVE — SIGNIFICANT CHANGE UP
EGFR: 74 ML/MIN/1.73M2 — SIGNIFICANT CHANGE UP
EOSINOPHIL # BLD AUTO: 0.19 K/UL — SIGNIFICANT CHANGE UP (ref 0–0.5)
EOSINOPHIL NFR BLD AUTO: 3 % — SIGNIFICANT CHANGE UP (ref 0–6)
FLUAV AG NPH QL: SIGNIFICANT CHANGE UP
FLUBV AG NPH QL: SIGNIFICANT CHANGE UP
GLUCOSE BLDC GLUCOMTR-MCNC: 213 MG/DL — HIGH (ref 70–99)
GLUCOSE BLDC GLUCOMTR-MCNC: 229 MG/DL — HIGH (ref 70–99)
GLUCOSE BLDC GLUCOMTR-MCNC: 246 MG/DL — HIGH (ref 70–99)
GLUCOSE BLDC GLUCOMTR-MCNC: 332 MG/DL — HIGH (ref 70–99)
GLUCOSE SERPL-MCNC: 249 MG/DL — HIGH (ref 70–99)
GLUCOSE UR QL: NEGATIVE MG/DL — SIGNIFICANT CHANGE UP
HCT VFR BLD CALC: 34.1 % — LOW (ref 34.5–45)
HGB BLD-MCNC: 10.9 G/DL — LOW (ref 11.5–15.5)
IMM GRANULOCYTES NFR BLD AUTO: 0.3 % — SIGNIFICANT CHANGE UP (ref 0–0.9)
KETONES UR-MCNC: NEGATIVE MG/DL — SIGNIFICANT CHANGE UP
LEUKOCYTE ESTERASE UR-ACNC: ABNORMAL
LYMPHOCYTES # BLD AUTO: 1.8 K/UL — SIGNIFICANT CHANGE UP (ref 1–3.3)
LYMPHOCYTES # BLD AUTO: 28.2 % — SIGNIFICANT CHANGE UP (ref 13–44)
MAGNESIUM SERPL-MCNC: 1.7 MG/DL — SIGNIFICANT CHANGE UP (ref 1.6–2.6)
MCHC RBC-ENTMCNC: 29.5 PG — SIGNIFICANT CHANGE UP (ref 27–34)
MCHC RBC-ENTMCNC: 32 GM/DL — SIGNIFICANT CHANGE UP (ref 32–36)
MCV RBC AUTO: 92.4 FL — SIGNIFICANT CHANGE UP (ref 80–100)
MONOCYTES # BLD AUTO: 0.44 K/UL — SIGNIFICANT CHANGE UP (ref 0–0.9)
MONOCYTES NFR BLD AUTO: 6.9 % — SIGNIFICANT CHANGE UP (ref 2–14)
NEUTROPHILS # BLD AUTO: 3.91 K/UL — SIGNIFICANT CHANGE UP (ref 1.8–7.4)
NEUTROPHILS NFR BLD AUTO: 61.3 % — SIGNIFICANT CHANGE UP (ref 43–77)
NITRITE UR-MCNC: NEGATIVE — SIGNIFICANT CHANGE UP
NRBC # BLD: 0 /100 WBCS — SIGNIFICANT CHANGE UP (ref 0–0)
PH UR: 5.5 — SIGNIFICANT CHANGE UP (ref 5–8)
PLATELET # BLD AUTO: 441 K/UL — HIGH (ref 150–400)
POTASSIUM SERPL-MCNC: 3.6 MMOL/L — SIGNIFICANT CHANGE UP (ref 3.5–5.3)
POTASSIUM SERPL-SCNC: 3.6 MMOL/L — SIGNIFICANT CHANGE UP (ref 3.5–5.3)
PROT SERPL-MCNC: 7.7 G/DL — SIGNIFICANT CHANGE UP (ref 6–8.3)
PROT UR-MCNC: NEGATIVE MG/DL — SIGNIFICANT CHANGE UP
PTH-INTACT FLD-MCNC: 163 PG/ML — HIGH (ref 15–65)
RBC # BLD: 3.69 M/UL — LOW (ref 3.8–5.2)
RBC # FLD: 13.2 % — SIGNIFICANT CHANGE UP (ref 10.3–14.5)
RBC CASTS # UR COMP ASSIST: 2 /HPF — SIGNIFICANT CHANGE UP (ref 0–4)
RSV RNA NPH QL NAA+NON-PROBE: SIGNIFICANT CHANGE UP
SARS-COV-2 RNA SPEC QL NAA+PROBE: SIGNIFICANT CHANGE UP
SODIUM SERPL-SCNC: 134 MMOL/L — LOW (ref 135–145)
SP GR SPEC: 1.02 — SIGNIFICANT CHANGE UP (ref 1–1.03)
SQUAMOUS # UR AUTO: 2 /HPF — SIGNIFICANT CHANGE UP (ref 0–5)
TSH SERPL-MCNC: 1.39 UIU/ML — SIGNIFICANT CHANGE UP (ref 0.27–4.2)
UROBILINOGEN FLD QL: 1 MG/DL — SIGNIFICANT CHANGE UP (ref 0.2–1)
WBC # BLD: 6.38 K/UL — SIGNIFICANT CHANGE UP (ref 3.8–10.5)
WBC # FLD AUTO: 6.38 K/UL — SIGNIFICANT CHANGE UP (ref 3.8–10.5)
WBC UR QL: 2 /HPF — SIGNIFICANT CHANGE UP (ref 0–5)

## 2024-03-21 PROCEDURE — 76770 US EXAM ABDO BACK WALL COMP: CPT | Mod: 26

## 2024-03-21 PROCEDURE — 99232 SBSQ HOSP IP/OBS MODERATE 35: CPT

## 2024-03-21 RX ORDER — INSULIN GLARGINE 100 [IU]/ML
35 INJECTION, SOLUTION SUBCUTANEOUS AT BEDTIME
Refills: 0 | Status: DISCONTINUED | OUTPATIENT
Start: 2024-03-21 | End: 2024-03-22

## 2024-03-21 RX ORDER — INSULIN LISPRO 100/ML
6 VIAL (ML) SUBCUTANEOUS
Refills: 0 | Status: DISCONTINUED | OUTPATIENT
Start: 2024-03-22 | End: 2024-03-24

## 2024-03-21 RX ORDER — INSULIN LISPRO 100/ML
6 VIAL (ML) SUBCUTANEOUS
Refills: 0 | Status: DISCONTINUED | OUTPATIENT
Start: 2024-03-22 | End: 2024-03-22

## 2024-03-21 RX ORDER — INSULIN LISPRO 100/ML
10 VIAL (ML) SUBCUTANEOUS
Refills: 0 | Status: DISCONTINUED | OUTPATIENT
Start: 2024-03-22 | End: 2024-03-22

## 2024-03-21 RX ORDER — MAGNESIUM SULFATE 500 MG/ML
2 VIAL (ML) INJECTION ONCE
Refills: 0 | Status: COMPLETED | OUTPATIENT
Start: 2024-03-21 | End: 2024-03-21

## 2024-03-21 RX ORDER — INSULIN LISPRO 100/ML
VIAL (ML) SUBCUTANEOUS AT BEDTIME
Refills: 0 | Status: DISCONTINUED | OUTPATIENT
Start: 2024-03-21 | End: 2024-03-26

## 2024-03-21 RX ORDER — INSULIN GLARGINE 100 [IU]/ML
35 INJECTION, SOLUTION SUBCUTANEOUS AT BEDTIME
Refills: 0 | Status: DISCONTINUED | OUTPATIENT
Start: 2024-03-21 | End: 2024-03-21

## 2024-03-21 RX ORDER — BUMETANIDE 0.25 MG/ML
1 INJECTION INTRAMUSCULAR; INTRAVENOUS DAILY
Refills: 0 | Status: DISCONTINUED | OUTPATIENT
Start: 2024-03-21 | End: 2024-03-23

## 2024-03-21 RX ORDER — POTASSIUM CHLORIDE 20 MEQ
40 PACKET (EA) ORAL ONCE
Refills: 0 | Status: COMPLETED | OUTPATIENT
Start: 2024-03-21 | End: 2024-03-21

## 2024-03-21 RX ORDER — INSULIN LISPRO 100/ML
VIAL (ML) SUBCUTANEOUS
Refills: 0 | Status: DISCONTINUED | OUTPATIENT
Start: 2024-03-22 | End: 2024-03-26

## 2024-03-21 RX ADMIN — OXYCODONE HYDROCHLORIDE 10 MILLIGRAM(S): 5 TABLET ORAL at 23:24

## 2024-03-21 RX ADMIN — OXYCODONE HYDROCHLORIDE 10 MILLIGRAM(S): 5 TABLET ORAL at 15:58

## 2024-03-21 RX ADMIN — Medication 1 TABLET(S): at 05:20

## 2024-03-21 RX ADMIN — OXYCODONE HYDROCHLORIDE 10 MILLIGRAM(S): 5 TABLET ORAL at 17:30

## 2024-03-21 RX ADMIN — Medication 1 TABLET(S): at 18:01

## 2024-03-21 RX ADMIN — Medication 200 MILLIGRAM(S): at 22:23

## 2024-03-21 RX ADMIN — OXYCODONE HYDROCHLORIDE 10 MILLIGRAM(S): 5 TABLET ORAL at 18:54

## 2024-03-21 RX ADMIN — INSULIN GLARGINE 35 UNIT(S): 100 INJECTION, SOLUTION SUBCUTANEOUS at 22:22

## 2024-03-21 RX ADMIN — SENNA PLUS 2 TABLET(S): 8.6 TABLET ORAL at 22:23

## 2024-03-21 RX ADMIN — Medication 2: at 17:31

## 2024-03-21 RX ADMIN — Medication 40 MILLIEQUIVALENT(S): at 11:11

## 2024-03-21 RX ADMIN — OXYCODONE HYDROCHLORIDE 10 MILLIGRAM(S): 5 TABLET ORAL at 14:21

## 2024-03-21 RX ADMIN — FAMOTIDINE 20 MILLIGRAM(S): 10 INJECTION INTRAVENOUS at 11:10

## 2024-03-21 RX ADMIN — Medication 200 MILLIGRAM(S): at 13:19

## 2024-03-21 RX ADMIN — CARVEDILOL PHOSPHATE 3.12 MILLIGRAM(S): 80 CAPSULE, EXTENDED RELEASE ORAL at 05:20

## 2024-03-21 RX ADMIN — OXYCODONE HYDROCHLORIDE 10 MILLIGRAM(S): 5 TABLET ORAL at 06:12

## 2024-03-21 RX ADMIN — OXYCODONE HYDROCHLORIDE 10 MILLIGRAM(S): 5 TABLET ORAL at 02:04

## 2024-03-21 RX ADMIN — OXYCODONE HYDROCHLORIDE 10 MILLIGRAM(S): 5 TABLET ORAL at 10:20

## 2024-03-21 RX ADMIN — Medication 2: at 08:48

## 2024-03-21 RX ADMIN — LISINOPRIL 5 MILLIGRAM(S): 2.5 TABLET ORAL at 05:20

## 2024-03-21 RX ADMIN — TIZANIDINE 4 MILLIGRAM(S): 4 TABLET ORAL at 03:43

## 2024-03-21 RX ADMIN — TIZANIDINE 4 MILLIGRAM(S): 4 TABLET ORAL at 22:46

## 2024-03-21 RX ADMIN — POLYETHYLENE GLYCOL 3350 17 GRAM(S): 17 POWDER, FOR SOLUTION ORAL at 22:22

## 2024-03-21 RX ADMIN — TIZANIDINE 4 MILLIGRAM(S): 4 TABLET ORAL at 14:21

## 2024-03-21 RX ADMIN — ENOXAPARIN SODIUM 40 MILLIGRAM(S): 100 INJECTION SUBCUTANEOUS at 05:21

## 2024-03-21 RX ADMIN — Medication 6 UNIT(S): at 08:47

## 2024-03-21 RX ADMIN — OXYCODONE HYDROCHLORIDE 10 MILLIGRAM(S): 5 TABLET ORAL at 22:24

## 2024-03-21 RX ADMIN — OXYCODONE HYDROCHLORIDE 10 MILLIGRAM(S): 5 TABLET ORAL at 05:20

## 2024-03-21 RX ADMIN — OXYCODONE HYDROCHLORIDE 10 MILLIGRAM(S): 5 TABLET ORAL at 12:13

## 2024-03-21 RX ADMIN — Medication 6 UNIT(S): at 17:31

## 2024-03-21 RX ADMIN — TIZANIDINE 4 MILLIGRAM(S): 4 TABLET ORAL at 09:42

## 2024-03-21 RX ADMIN — OXYCODONE HYDROCHLORIDE 10 MILLIGRAM(S): 5 TABLET ORAL at 01:15

## 2024-03-21 RX ADMIN — Medication 200 MILLIGRAM(S): at 05:20

## 2024-03-21 RX ADMIN — NALOXEGOL OXALATE 25 MILLIGRAM(S): 12.5 TABLET, FILM COATED ORAL at 11:10

## 2024-03-21 RX ADMIN — Medication 4: at 12:08

## 2024-03-21 RX ADMIN — ATORVASTATIN CALCIUM 20 MILLIGRAM(S): 80 TABLET, FILM COATED ORAL at 22:24

## 2024-03-21 RX ADMIN — Medication 25 GRAM(S): at 11:12

## 2024-03-21 RX ADMIN — Medication 5 MILLIGRAM(S): at 22:24

## 2024-03-21 RX ADMIN — ENOXAPARIN SODIUM 40 MILLIGRAM(S): 100 INJECTION SUBCUTANEOUS at 17:31

## 2024-03-21 RX ADMIN — Medication 6 UNIT(S): at 12:07

## 2024-03-21 NOTE — PROGRESS NOTE ADULT - SUBJECTIVE AND OBJECTIVE BOX
SUBJECTIVE / OVERNIGHT EVENTS:  Pt seen and evaluated at bedside. Still with back pain but improving. Denies any fevers, chills, sob, n/v, abd pain.       MEDICATIONS  (STANDING):  atorvastatin 20 milliGRAM(s) Oral at bedtime  carvedilol 3.125 milliGRAM(s) Oral every 12 hours  dextrose 5%. 1000 milliLiter(s) (100 mL/Hr) IV Continuous <Continuous>  dextrose 5%. 1000 milliLiter(s) (50 mL/Hr) IV Continuous <Continuous>  dextrose 50% Injectable 25 Gram(s) IV Push once  dextrose 50% Injectable 12.5 Gram(s) IV Push once  dextrose 50% Injectable 25 Gram(s) IV Push once  enoxaparin Injectable 40 milliGRAM(s) SubCutaneous every 12 hours  famotidine    Tablet 20 milliGRAM(s) Oral daily  glucagon  Injectable 1 milliGRAM(s) IntraMuscular once  HYDROmorphone  Injectable 1 milliGRAM(s) IV Push once  insulin glargine Injectable (LANTUS) 30 Unit(s) SubCutaneous at bedtime  insulin lispro (ADMELOG) corrective regimen sliding scale   SubCutaneous three times a day before meals  insulin lispro (ADMELOG) corrective regimen sliding scale   SubCutaneous at bedtime  insulin lispro Injectable (ADMELOG) 6 Unit(s) SubCutaneous three times a day before meals  lisinopril 5 milliGRAM(s) Oral daily  naloxegol 25 milliGRAM(s) Oral daily  oxyCODONE  ER Tablet 10 milliGRAM(s) Oral every 12 hours  pregabalin 200 milliGRAM(s) Oral three times a day  tiZANidine 4 milliGRAM(s) Oral every 6 hours    MEDICATIONS  (PRN):  benzonatate 100 milliGRAM(s) Oral three times a day PRN Cough  dextrose Oral Gel 15 Gram(s) Oral once PRN Blood Glucose LESS THAN 70 milliGRAM(s)/deciliter  melatonin 5 milliGRAM(s) Oral at bedtime PRN Sleep      I&O's Summary    19 Mar 2024 07:01  -  20 Mar 2024 07:00  --------------------------------------------------------  IN: 250 mL / OUT: 750 mL / NET: -500 mL    20 Mar 2024 07:01  -  20 Mar 2024 15:16  --------------------------------------------------------  IN: 250 mL / OUT: 0 mL / NET: 250 mL        PHYSICAL EXAM:  Vital Signs Last 24 Hrs  T(C): 36.6 (20 Mar 2024 12:23), Max: 36.9 (20 Mar 2024 00:31)  T(F): 97.9 (20 Mar 2024 12:23), Max: 98.5 (20 Mar 2024 00:31)  HR: 69 (20 Mar 2024 12:23) (69 - 72)  BP: 104/66 (20 Mar 2024 12:23) (104/66 - 134/82)  BP(mean): --  RR: 18 (20 Mar 2024 12:23) (18 - 22)  SpO2: 94% (20 Mar 2024 12:23) (94% - 96%)    Parameters below as of 20 Mar 2024 12:23  Patient On (Oxygen Delivery Method): room air      CONSTITUTIONAL: NAD  EYES: EOMI; conjunctiva and sclera clear  ENMT: Moist oral mucosa, no pharyngeal injection or exudates  NECK: Supple, trachea midline   RESPIRATORY: Normal respiratory effort; lungs are clear to auscultation bilaterally  CARDIOVASCULAR: Regular rate and rhythm, normal S1 and S2  ABDOMEN: Nontender to palpation, normoactive bowel sounds, no rebound/guarding  MUSCULOSKELETAL: moves all extremities   PSYCH: A+O to person, place, and time  NEUROLOGY: grossly nonfocal       LABS:                         10.9   6.38  )-----------( 441      ( 21 Mar 2024 07:35 )             34.1     03-21    134<L>  |  95<L>  |  14  ----------------------------<  249<H>  3.6   |  24  |  0.92    Ca    7.4<L>      21 Mar 2024 07:32  Phos  4.8     03-20  Mg     1.7     03-21    TPro  7.7  /  Alb  3.9  /  TBili  0.6  /  DBili  x   /  AST  14  /  ALT  20  /  AlkPhos  130<H>  03-21      Urinalysis Basic - ( 21 Mar 2024 11:29 )    Color: Yellow / Appearance: Clear / S.017 / pH: x  Gluc: x / Ketone: Negative mg/dL  / Bili: Negative / Urobili: 1.0 mg/dL   Blood: x / Protein: Negative mg/dL / Nitrite: Negative   Leuk Esterase: Trace / RBC: 2 /HPF / WBC 2 /HPF   Sq Epi: x / Non Sq Epi: 2 /HPF / Bacteria: Moderate /HPF

## 2024-03-21 NOTE — PROGRESS NOTE ADULT - PROBLEM SELECTOR PLAN 2
TTE: EF 65%, normal diastolic function, no significant changes compared to prior echo.   bumex 1mg daily

## 2024-03-21 NOTE — CONSULT NOTE ADULT - ASSESSMENT
53y old Female with history of T2DM, CHF, Idiopathic intracranial hypertension, HTN, HLD here with acute on chronic back pain.     1. T2DM with hyperglycemia  - Significantly hyperglycemic after meals, particularly breakfast so will increase insulin  - Continue Tresiba 30 units at night  - Increase Admelog to 10 units with breakfast and continue 6 units with lunch and dinner  - Change to moderate Admelog correctional scale before meals and bedtime  - Monitor FS before meals and bedtime  - Follow hospital hypoglycemic protocol    2. CHF  - on Bumex and Coreg  - Consider Jardiance/Farxiga outpatient    3. HLD  - Continue Atorvastatin 20 mg daily    Will continue to follow.    Melissa Murray MD  [facility] Division of Endocrin[facility]    [address] [place] [address]    T [phone]  F [phone]    53y old Female with history of T2DM, CHF, Idiopathic intracranial hypertension, HTN, HLD here with acute on chronic back pain.     1. T2DM with hyperglycemia  - Significantly hyperglycemic after meals, particularly breakfast so will increase insulin  - Increase Lantus to 35 units at night  - Increase Admelog to 10 units with breakfast and continue 6 units with lunch and dinner  - Change to moderate Admelog correctional scale before meals and bedtime  - Monitor FS before meals and bedtime  - Follow hospital hypoglycemic protocol    2. CHF  - on Bumex and Coreg  - Consider Jardiance/Farxiga outpatient    3. HLD  - Continue Atorvastatin 20 mg daily    Will continue to follow.    Melissa Murray MD  [facility]    [address] [place] [address]    T [phone]  F [phone]

## 2024-03-21 NOTE — PROVIDER CONTACT NOTE (OTHER) - ASSESSMENT
pt AOx4, in no acute distress, pt states that she has been unable to urinate but she feels the urge, pt requesting to be straight cath
pt AOx4, in no acute distress, VSS, pt denies any chills, weakness, headaches or dizziness, pt received 30 units of Lantus, or 4 units of Admelog prior

## 2024-03-21 NOTE — CONSULT NOTE ADULT - SUBJECTIVE AND OBJECTIVE BOX
[facility] Initial Consult Note    HPI  53y old Female with history of T2DM, CHF, Idiopathic intracranial hypertension, HTN, HLD here with acute on chronic back pain.     History was obtained from patient and chart review.    Diabetes history: T2DM since age 17 and on insulin since age 19. Her diabetes is not too well controlled and admits it's usually due to diet. Previously on Jardiance and stopped at some point for unclear reasons.  Home regimen: Tresiba 45 units at night, Novolog 10-12 units with meals, Metformin 1000 mg BID and Ozempic 0.5 mg weekly (started about 4-5 months ago)  Home FS: reports they're in the high 100s to mid 200s  A1C: 7.3% on 2/19  Outpatient endocrinologist: none, PCP manages     ROS  Denies chest pain, shortness of breath, abdominal pain, nausea, vomiting, diarrhea, dizziness, lightheadedness. Reports good appetite.    Vital Signs Last 24 Hrs  T(C): 36.8 (03-21-24 @ 19:23), Max: 36.8 (03-21-24 @ 04:42)  HR: 65 (03-21-24 @ 19:23) (61 - 69)  BP: 103/67 (03-21-24 @ 19:23) (81/51 - 117/68)  RR: 18 (03-21-24 @ 19:23) (17 - 18)  SpO2: 98% (03-21-24 @ 19:23) (96% - 99%)    Physical Exam  Gen: NAD, alert, awake  HEENT: NC/AT, moist mucous membranes  Chest: normal respiratory effort  Heart: +S1 S2  Neuro: normal mood and affect    Medications  atorvastatin 20 milliGRAM(s) Oral at bedtime  benzonatate 100 milliGRAM(s) Oral three times a day PRN  buMETAnide 1 milliGRAM(s) Oral daily  calcium carbonate   1250 mG (OsCal) 1 Tablet(s) Oral two times a day  carvedilol 3.125 milliGRAM(s) Oral every 12 hours  dextrose 5%. 1000 milliLiter(s) IV Continuous <Continuous>  dextrose 5%. 1000 milliLiter(s) IV Continuous <Continuous>  dextrose 50% Injectable 25 Gram(s) IV Push once  dextrose 50% Injectable 12.5 Gram(s) IV Push once  dextrose 50% Injectable 25 Gram(s) IV Push once  dextrose Oral Gel 15 Gram(s) Oral once PRN  enoxaparin Injectable 40 milliGRAM(s) SubCutaneous every 12 hours  famotidine    Tablet 20 milliGRAM(s) Oral daily  glucagon  Injectable 1 milliGRAM(s) IntraMuscular once  insulin glargine Injectable (LANTUS) 35 Unit(s) SubCutaneous at bedtime  insulin lispro (ADMELOG) corrective regimen sliding scale   SubCutaneous three times a day before meals  insulin lispro (ADMELOG) corrective regimen sliding scale   SubCutaneous at bedtime  insulin lispro Injectable (ADMELOG) 6 Unit(s) SubCutaneous three times a day before meals  melatonin 5 milliGRAM(s) Oral at bedtime PRN  naloxegol 25 milliGRAM(s) Oral daily  oxyCODONE    IR 5 milliGRAM(s) Oral every 4 hours PRN  oxyCODONE    IR 10 milliGRAM(s) Oral every 4 hours PRN  oxyCODONE  ER Tablet 10 milliGRAM(s) Oral every 12 hours  polyethylene glycol 3350 17 Gram(s) Oral at bedtime  pregabalin 200 milliGRAM(s) Oral three times a day  senna 2 Tablet(s) Oral at bedtime  tiZANidine 4 milliGRAM(s) Oral every 6 hours    Pertinent labs/imaging  POCT Blood Glucose.: 213 mg/dL (03-21-24 @ 17:02)  POCT Blood Glucose.: 332 mg/dL (03-21-24 @ 12:02)  POCT Blood Glucose.: 229 mg/dL (03-21-24 @ 08:25)  POCT Blood Glucose.: 405 mg/dL (03-20-24 @ 22:40)  POCT Blood Glucose.: 411 mg/dL (03-20-24 @ 21:39)    eGFR: 74 mL/min/1.73m2 (03-21-24 @ 07:32)  eGFR: 95 mL/min/1.73m2 (03-20-24 @ 11:47)

## 2024-03-22 LAB
ANION GAP SERPL CALC-SCNC: 11 MMOL/L — SIGNIFICANT CHANGE UP (ref 5–17)
BASOPHILS # BLD AUTO: 0.02 K/UL — SIGNIFICANT CHANGE UP (ref 0–0.2)
BASOPHILS NFR BLD AUTO: 0.3 % — SIGNIFICANT CHANGE UP (ref 0–2)
BUN SERPL-MCNC: 11 MG/DL — SIGNIFICANT CHANGE UP (ref 7–23)
CALCIUM SERPL-MCNC: 8.8 MG/DL — SIGNIFICANT CHANGE UP (ref 8.4–10.5)
CHLORIDE SERPL-SCNC: 99 MMOL/L — SIGNIFICANT CHANGE UP (ref 96–108)
CO2 SERPL-SCNC: 25 MMOL/L — SIGNIFICANT CHANGE UP (ref 22–31)
CREAT SERPL-MCNC: 0.78 MG/DL — SIGNIFICANT CHANGE UP (ref 0.5–1.3)
EGFR: 91 ML/MIN/1.73M2 — SIGNIFICANT CHANGE UP
EOSINOPHIL # BLD AUTO: 0.21 K/UL — SIGNIFICANT CHANGE UP (ref 0–0.5)
EOSINOPHIL NFR BLD AUTO: 3.1 % — SIGNIFICANT CHANGE UP (ref 0–6)
GLUCOSE BLDC GLUCOMTR-MCNC: 204 MG/DL — HIGH (ref 70–99)
GLUCOSE BLDC GLUCOMTR-MCNC: 223 MG/DL — HIGH (ref 70–99)
GLUCOSE BLDC GLUCOMTR-MCNC: 296 MG/DL — HIGH (ref 70–99)
GLUCOSE BLDC GLUCOMTR-MCNC: 323 MG/DL — HIGH (ref 70–99)
GLUCOSE SERPL-MCNC: 217 MG/DL — HIGH (ref 70–99)
HCT VFR BLD CALC: 34.6 % — SIGNIFICANT CHANGE UP (ref 34.5–45)
HGB BLD-MCNC: 10.8 G/DL — LOW (ref 11.5–15.5)
IMM GRANULOCYTES NFR BLD AUTO: 0.1 % — SIGNIFICANT CHANGE UP (ref 0–0.9)
LYMPHOCYTES # BLD AUTO: 2.3 K/UL — SIGNIFICANT CHANGE UP (ref 1–3.3)
LYMPHOCYTES # BLD AUTO: 34.1 % — SIGNIFICANT CHANGE UP (ref 13–44)
MAGNESIUM SERPL-MCNC: 2 MG/DL — SIGNIFICANT CHANGE UP (ref 1.6–2.6)
MCHC RBC-ENTMCNC: 28.8 PG — SIGNIFICANT CHANGE UP (ref 27–34)
MCHC RBC-ENTMCNC: 31.2 GM/DL — LOW (ref 32–36)
MCV RBC AUTO: 92.3 FL — SIGNIFICANT CHANGE UP (ref 80–100)
MONOCYTES # BLD AUTO: 0.54 K/UL — SIGNIFICANT CHANGE UP (ref 0–0.9)
MONOCYTES NFR BLD AUTO: 8 % — SIGNIFICANT CHANGE UP (ref 2–14)
NEUTROPHILS # BLD AUTO: 3.67 K/UL — SIGNIFICANT CHANGE UP (ref 1.8–7.4)
NEUTROPHILS NFR BLD AUTO: 54.4 % — SIGNIFICANT CHANGE UP (ref 43–77)
NRBC # BLD: 0 /100 WBCS — SIGNIFICANT CHANGE UP (ref 0–0)
PLATELET # BLD AUTO: 442 K/UL — HIGH (ref 150–400)
POTASSIUM SERPL-MCNC: 4.2 MMOL/L — SIGNIFICANT CHANGE UP (ref 3.5–5.3)
POTASSIUM SERPL-SCNC: 4.2 MMOL/L — SIGNIFICANT CHANGE UP (ref 3.5–5.3)
RBC # BLD: 3.75 M/UL — LOW (ref 3.8–5.2)
RBC # FLD: 13.2 % — SIGNIFICANT CHANGE UP (ref 10.3–14.5)
SODIUM SERPL-SCNC: 135 MMOL/L — SIGNIFICANT CHANGE UP (ref 135–145)
WBC # BLD: 6.75 K/UL — SIGNIFICANT CHANGE UP (ref 3.8–10.5)
WBC # FLD AUTO: 6.75 K/UL — SIGNIFICANT CHANGE UP (ref 3.8–10.5)

## 2024-03-22 PROCEDURE — 99232 SBSQ HOSP IP/OBS MODERATE 35: CPT

## 2024-03-22 RX ORDER — INSULIN LISPRO 100/ML
16 VIAL (ML) SUBCUTANEOUS
Refills: 0 | Status: DISCONTINUED | OUTPATIENT
Start: 2024-03-23 | End: 2024-03-24

## 2024-03-22 RX ORDER — ONDANSETRON 8 MG/1
4 TABLET, FILM COATED ORAL ONCE
Refills: 0 | Status: COMPLETED | OUTPATIENT
Start: 2024-03-22 | End: 2024-03-22

## 2024-03-22 RX ORDER — INSULIN GLARGINE 100 [IU]/ML
40 INJECTION, SOLUTION SUBCUTANEOUS AT BEDTIME
Refills: 0 | Status: DISCONTINUED | OUTPATIENT
Start: 2024-03-22 | End: 2024-03-23

## 2024-03-22 RX ORDER — INSULIN LISPRO 100/ML
10 VIAL (ML) SUBCUTANEOUS
Refills: 0 | Status: DISCONTINUED | OUTPATIENT
Start: 2024-03-22 | End: 2024-03-23

## 2024-03-22 RX ORDER — SPIRONOLACTONE 25 MG/1
25 TABLET, FILM COATED ORAL DAILY
Refills: 0 | Status: DISCONTINUED | OUTPATIENT
Start: 2024-03-22 | End: 2024-03-23

## 2024-03-22 RX ORDER — POLYETHYLENE GLYCOL 3350 17 G/17G
17 POWDER, FOR SOLUTION ORAL
Refills: 0 | Status: DISCONTINUED | OUTPATIENT
Start: 2024-03-22 | End: 2024-03-26

## 2024-03-22 RX ADMIN — OXYCODONE HYDROCHLORIDE 10 MILLIGRAM(S): 5 TABLET ORAL at 13:14

## 2024-03-22 RX ADMIN — SENNA PLUS 2 TABLET(S): 8.6 TABLET ORAL at 21:24

## 2024-03-22 RX ADMIN — BUMETANIDE 1 MILLIGRAM(S): 0.25 INJECTION INTRAMUSCULAR; INTRAVENOUS at 06:37

## 2024-03-22 RX ADMIN — Medication 6 UNIT(S): at 13:13

## 2024-03-22 RX ADMIN — Medication 1 TABLET(S): at 06:38

## 2024-03-22 RX ADMIN — OXYCODONE HYDROCHLORIDE 10 MILLIGRAM(S): 5 TABLET ORAL at 06:37

## 2024-03-22 RX ADMIN — OXYCODONE HYDROCHLORIDE 10 MILLIGRAM(S): 5 TABLET ORAL at 09:45

## 2024-03-22 RX ADMIN — OXYCODONE HYDROCHLORIDE 10 MILLIGRAM(S): 5 TABLET ORAL at 07:03

## 2024-03-22 RX ADMIN — OXYCODONE HYDROCHLORIDE 10 MILLIGRAM(S): 5 TABLET ORAL at 22:24

## 2024-03-22 RX ADMIN — Medication 200 MILLIGRAM(S): at 21:24

## 2024-03-22 RX ADMIN — TIZANIDINE 4 MILLIGRAM(S): 4 TABLET ORAL at 15:35

## 2024-03-22 RX ADMIN — OXYCODONE HYDROCHLORIDE 10 MILLIGRAM(S): 5 TABLET ORAL at 18:04

## 2024-03-22 RX ADMIN — FAMOTIDINE 20 MILLIGRAM(S): 10 INJECTION INTRAVENOUS at 11:50

## 2024-03-22 RX ADMIN — OXYCODONE HYDROCHLORIDE 10 MILLIGRAM(S): 5 TABLET ORAL at 21:24

## 2024-03-22 RX ADMIN — OXYCODONE HYDROCHLORIDE 10 MILLIGRAM(S): 5 TABLET ORAL at 13:50

## 2024-03-22 RX ADMIN — POLYETHYLENE GLYCOL 3350 17 GRAM(S): 17 POWDER, FOR SOLUTION ORAL at 18:04

## 2024-03-22 RX ADMIN — ENOXAPARIN SODIUM 40 MILLIGRAM(S): 100 INJECTION SUBCUTANEOUS at 06:36

## 2024-03-22 RX ADMIN — Medication 10 UNIT(S): at 18:07

## 2024-03-22 RX ADMIN — Medication 10 UNIT(S): at 08:57

## 2024-03-22 RX ADMIN — SPIRONOLACTONE 25 MILLIGRAM(S): 25 TABLET, FILM COATED ORAL at 11:50

## 2024-03-22 RX ADMIN — INSULIN GLARGINE 40 UNIT(S): 100 INJECTION, SOLUTION SUBCUTANEOUS at 21:24

## 2024-03-22 RX ADMIN — Medication 8: at 13:13

## 2024-03-22 RX ADMIN — NALOXEGOL OXALATE 25 MILLIGRAM(S): 12.5 TABLET, FILM COATED ORAL at 11:50

## 2024-03-22 RX ADMIN — Medication 2: at 21:29

## 2024-03-22 RX ADMIN — Medication 4: at 08:57

## 2024-03-22 RX ADMIN — Medication 200 MILLIGRAM(S): at 06:36

## 2024-03-22 RX ADMIN — TIZANIDINE 4 MILLIGRAM(S): 4 TABLET ORAL at 21:24

## 2024-03-22 RX ADMIN — ENOXAPARIN SODIUM 40 MILLIGRAM(S): 100 INJECTION SUBCUTANEOUS at 18:06

## 2024-03-22 RX ADMIN — ONDANSETRON 4 MILLIGRAM(S): 8 TABLET, FILM COATED ORAL at 22:36

## 2024-03-22 RX ADMIN — ATORVASTATIN CALCIUM 20 MILLIGRAM(S): 80 TABLET, FILM COATED ORAL at 21:24

## 2024-03-22 RX ADMIN — Medication 100 MILLIGRAM(S): at 21:29

## 2024-03-22 RX ADMIN — Medication 200 MILLIGRAM(S): at 14:29

## 2024-03-22 RX ADMIN — OXYCODONE HYDROCHLORIDE 10 MILLIGRAM(S): 5 TABLET ORAL at 09:03

## 2024-03-22 RX ADMIN — Medication 100 MILLIGRAM(S): at 09:04

## 2024-03-22 RX ADMIN — Medication 4: at 18:07

## 2024-03-22 RX ADMIN — CARVEDILOL PHOSPHATE 3.12 MILLIGRAM(S): 80 CAPSULE, EXTENDED RELEASE ORAL at 06:37

## 2024-03-22 RX ADMIN — TIZANIDINE 4 MILLIGRAM(S): 4 TABLET ORAL at 09:04

## 2024-03-22 NOTE — DIETITIAN INITIAL EVALUATION ADULT - ADD RECOMMEND
Reinforce diet adherence/diet education. Emphasis on portion control, low sodium intake, carbohydrate counting, weight control

## 2024-03-22 NOTE — PROGRESS NOTE ADULT - PROBLEM SELECTOR PLAN 2
TTE: EF 65%, normal diastolic function, no significant changes compared to prior echo.   bumex 1mg daily and spironolactone 25mg daily

## 2024-03-22 NOTE — PROGRESS NOTE ADULT - ASSESSMENT
52 y/o F with PMH of CHF, HTN, HLD, DM, Idiopathic intracranial hypertension (diagnosed 2020), chronic right sided back pain (intercostal neuralgia/complex regional pain syndrome) s/p spinal stimulator p/w poorly controlled back pain.

## 2024-03-22 NOTE — DIETITIAN INITIAL EVALUATION ADULT - PROBLEM SELECTOR PROBLEM 6
Patient:   MONTANA DIANA            MRN: [id number]            FIN: [id number]               Age:   26 years     Sex:  MALE     :  91   Associated Diagnoses:   None   Author:   GEORGIA GARCIA      Chief Complaint   syncope      History of Present Illness             The patient presents with Mr. Montana Diana is a 26 year old male with no prior medical history presents with syncopal episode. Patient reports that he was at work, working as a pelaez in the kitchen. He had just placed some items in the refrigerator when he felt suddenly weak and collapsed. He is unclear how long he was down for. Denies any prior epsidoes of syncope or LOC. deneis head trauma. Denies headache, palpitations, chest pain, diaphoresis, lightheadedness or dizziness preceding the sycnopal epsiode. denies family history of cardiac disease or sudden death in young family members.  Reports episode of diarrhea and subjective fever yesterday. Denies chills. deneis dysuria. dneies cough/congestion or viral syndrome.  Reports eating well and he has been exercising.     deneis use of alcohol, tobacco or recreational drugs. Initial troponin was negative. EKG shows diffuse T Wave changes. Patient admitted for further workup. .        Review of Systems   Constitutional:  No fever, No chills, No sweats, No weakness, No fatigue, No decreased activity.    Eye:  No visual disturbances.    Ear/Nose/Mouth/Throat:  No nasal congestion, No sore throat.    Cardiovascular:  Bradycardia, Syncope, No chest pain, No palpitations, No tachycardia, No peripheral edema.    Respiratory:  No shortness of breath, No cough, No sputum production, No hemoptysis, No wheezing, No cyanosis.    Gastrointestinal:  Diarrhea, No nausea, No vomiting, No constipation, No heartburn, No abdominal pain.    Genitourinary:  No dysuria.    Musculoskeletal   Integumentary:  No rash.    Hematology/Lymphatics   Neurologic:  Alert and oriented X4.    Endocrine    Allergy/Immunologic:     Allergies (1) Active Reaction  PCN (penicillins) None Documented  .    Psychiatric   All other systems ROS reviewed as documented in chart.     Histories   Past Med History: Abscess  H/O: blood transfusion  Transfusion reaction  Wound check, abscess      Family History: No positive family history reported.      Procedure History: No qualifying data available.      Social History        Alcohol  Details: Use: Current.  Frequency: 1-2 times per week.  Substance Abuse  Details: Use: None.  Tobacco  Details: Used in Last 12 Months: No.  Use: Never smoker.  .        Health Status   Allergies: Allergies (ST)   Allergies (1) Active Reaction  PCN (penicillins) None Documented     Current medications: No qualifying data available         Physical Examination   VS/Measurements        Vitals between:   19-FEB-2018 16:20:10   TO   20-FEB-2018 16:20:10                   LAST RESULT MINIMUM MAXIMUM  Temperature 37.0 37.0 37.1  Heart Rate 50 45 51  Respiratory Rate 19 8 19  NISBP           113 113 167  NIDBP           74 67 92  NIMBP           80 77 108  SpO2                    99 99 100     General:  Alert and oriented, No acute distress.    Eye:  Extraocular movements are intact, Normal conjunctiva.    HENT:  Normocephalic.    Neck:  Supple, Non-tender.    Respiratory:  Lungs are clear to auscultation, Respirations are non-labored, Breath sounds are equal, Symmetrical chest wall expansion, No chest wall tenderness.    Cardiovascular:  Normal rate, Regular rhythm, No murmur, No gallop, Good pulses equal in all extremities, Normal peripheral perfusion, No edema.    Gastrointestinal:  Soft, Non-tender, Non-distended, Normal bowel sounds.    Musculoskeletal:  Normal range of motion, Normal strength, No tenderness, No swelling.    Integumentary:  Warm, Dry.    Neurologic:  Alert, Oriented.    Cognition and Speech:  Oriented, Speech clear and coherent.    Psychiatric:  Cooperative, Appropriate mood & affect.        Review / Management   Laboratory results:    Labs between:  19-FEB-2018 16:20 to 20-FEB-2018 16:20    CBC:                 WBC  HgB  Hct  Plt  MCV  RDW   20-FEB-2018 5.2  13.1  39.5  251  85.3  13.0     DIFF:                 Seg  Neutroph//ABS  Lymph//ABS  Mono//ABS  EOS/ABS   20-FEB-2018 NOT APPLICABLE  42 // 2.2 47 // 2.5 6 // 0.3 4 // 0.2    BMP:                 Na  Cl  BUN  Glu   20-FEB-2018 137  106  10  (H) 102                              K  CO2  Cr  Ca                              3.6  23  1.08  8.7     Other Chem:             Mg  Phos  Triglycerides  GGTP  DirectBili                           1.9                          No qualifying data available   .    Radiology results                 Result title:  XR CHEST 2V  Result status:  Final  Verified by:  KWAN, [facility] on 02/20/2018   IMPRESSION:No acute cardiopulmonary findings.FOR PHYSICIAN USE ONLY - Please note that this report was generated using voice recognition software.  If you require clarification or feel that there has been an error in this report please contact the [facility] Help Desk at [phone].  Thank you very much for allowing me to participate in the care of your patient.      Documentation reviewed:  Reviewed Results: Documents from flowsheet.       Impression and Plan   Dx and Plan:  Diagnosis     Mr. Pascal is a 26 year old male admitted wt syncope.    Syncope  Bradycardia  Troponins *2: negative  Cardiology consult  obtain echocardiogram  Monitor on telemetry  Electrolytes stable  TSH: WNL    Dispo: To address above issues, I certify that this patient needs less than 2 midnight stay, hence observation.    Sandy Herr MD  [facility].     .             Electronically Signed On 02/20/2018 16:27  __________________________________________________   SANDY GARCIA     Need for prophylactic measure

## 2024-03-22 NOTE — DIETITIAN INITIAL EVALUATION ADULT - PROBLEM SELECTOR PROBLEM 5
Met with patient at [facility]. Gave patient information on how to obtain insurance card as well as giving her a copy of her insurance card from the file. Patient informed this  that her gas and electric are in jeopardy of being disconnected and she does not have the funds to make the bills current. Gave patient strict instructions to contact [employer] and [facility] to request a medical clearance. Patient also obtained the clinic's fax number to relay to the utility companies. Encouraged patient to discuss current health concerns with her patient. She was able to verbally her concerns to the physician. She was given a referral for pain clinic, an order for CT scans as well as a script for medication to assist with the nausea she experiences during ct scans.  to follow up with patient by end of the week to determine progress on goals. Severe pulmonary hypertension

## 2024-03-22 NOTE — DIETITIAN INITIAL EVALUATION ADULT - OTHER INFO
patient monitors her glucose at home, checking her FS up to 8 times daily as stated  A1C 7.5% (12/23)

## 2024-03-22 NOTE — DIETITIAN INITIAL EVALUATION ADULT - ORAL INTAKE PTA/DIET HISTORY
Patient states she once much heavier than today, she has been trying to loose weight since Covid 3 yrs ago when she  weighed 500 lbs( per medical record weight 12/23 300lbs). Pt has HHA who cooks for her and helps follow a diabetic meal  pattern. Patient states she eats a lot of vegetables and uses " The Plate method to estimate portion sizes. Pt states she has tendency to "hold water" so her HHA avoids adding salt to her food. Pt is in chronic pain as stated  and this limits her mobility.  Patient states she once much heavier than today, she has been trying to loose weight since Covid 3 yrs ago when she  weighed 500 lbs( per medical record weight 12/23 300lbs). Pt has HHA who cooks for her and helps follow a diabetic meal  pattern. Patient states she eats a lot of vegetables and uses " The Plate method to estimate portion sizes. Pt states she has tendency to "hold water" so her HHA avoids adding salt to her food. Pt is in chronic pain as stated  and this limits her mobility. Patient uses Himalayan salt at times

## 2024-03-22 NOTE — DIETITIAN NUTRITION RISK NOTIFICATION - TREATMENT: THE FOLLOWING DIET HAS BEEN RECOMMENDED
Diet, DASH/TLC:   Sodium & Cholesterol Restricted  Consistent Carbohydrate {Evening Snack} (CSTCHOSN) (03-20-24 @ 09:44) [Active]

## 2024-03-22 NOTE — DIETITIAN INITIAL EVALUATION ADULT - PERTINENT LABORATORY DATA
03-22    135  |  99  |  11  ----------------------------<  217<H>  4.2   |  25  |  0.78    Ca    8.8      22 Mar 2024 09:21  Mg     2.0     03-22    TPro  7.7  /  Alb  3.9  /  TBili  0.6  /  DBili  x   /  AST  14  /  ALT  20  /  AlkPhos  130<H>  03-21  POCT Blood Glucose.: 223 mg/dL (03-22-24 @ 08:13)  A1C with Estimated Average Glucose Result: 7.3 % (02-19-24 @ 05:45)  A1C with Estimated Average Glucose Result: 7.5 % (12-09-23 @ 05:12)

## 2024-03-22 NOTE — DIETITIAN INITIAL EVALUATION ADULT - REASON INDICATOR FOR ASSESSMENT
MST>2 54 y/o F with BMI 53.4, history of apparent HF with preserved EF, S/P past cardiac cath with normal coronaries but undifferentiated severe pulmonary HTN, with patient on multiple diuretic regimens (Bumex, Diamox, Aldactone) for chronic lower leg oedema, type 2 DM on metformin but also preprandial Novolog insulin 12 U and Tresiba 45 units at bedtime

## 2024-03-22 NOTE — PROGRESS NOTE ADULT - ASSESSMENT
53y old Female with history of T2DM, CHF, Idiopathic intracranial hypertension, HTN, HLD here with acute on chronic back pain.     1. T2DM with hyperglycemia    - Increase Lantus to 40 units at night  - Continue Admelog 10 units with breakfast, 6 units with lunch and increase to 10 units with dinner  - Continue moderate Admelog correctional scale before meals and bedtime  - Monitor FS before meals and bedtime  - Follow hospital hypoglycemic protocol    2. CHF  - on Bumex and Coreg  - Consider Jardiance/Farxiga outpatient    3. HLD  - Continue Atorvastatin 20 mg daily    Will continue to follow.    Melissa Murray MD  [facility]    [address] [place] [address]    T [phone]  F [phone]    53y old Female with history of T2DM, CHF, Idiopathic intracranial hypertension, HTN, HLD here with acute on chronic back pain.     1. T2DM with hyperglycemia  - numbers remain high  - Increase Lantus to 40 units at night  - Increase Admelog to 16 units with breakfast, continue 6 units with lunch and increase to 10 units with dinner  - Continue moderate Admelog correctional scale before meals and bedtime  - Monitor FS before meals and bedtime  - Follow hospital hypoglycemic protocol    2. CHF  - on Bumex and Coreg  - Consider Jardiance/Farxiga outpatient    3. HLD  - Continue Atorvastatin 20 mg daily    Will continue to follow.    Melissa Murray MD  [facility]    [address] [place] [address]    T [phone]  F [phone]

## 2024-03-22 NOTE — DIETITIAN INITIAL EVALUATION ADULT - PERTINENT MEDS FT
MEDICATIONS  (STANDING):  atorvastatin 20 milliGRAM(s) Oral at bedtime  buMETAnide 1 milliGRAM(s) Oral daily  carvedilol 3.125 milliGRAM(s) Oral every 12 hours  dextrose 5%. 1000 milliLiter(s) (100 mL/Hr) IV Continuous <Continuous>  dextrose 5%. 1000 milliLiter(s) (50 mL/Hr) IV Continuous <Continuous>  dextrose 50% Injectable 25 Gram(s) IV Push once  dextrose 50% Injectable 12.5 Gram(s) IV Push once  dextrose 50% Injectable 25 Gram(s) IV Push once  enoxaparin Injectable 40 milliGRAM(s) SubCutaneous every 12 hours  famotidine    Tablet 20 milliGRAM(s) Oral daily  glucagon  Injectable 1 milliGRAM(s) IntraMuscular once  insulin glargine Injectable (LANTUS) 40 Unit(s) SubCutaneous at bedtime  insulin lispro (ADMELOG) corrective regimen sliding scale   SubCutaneous three times a day before meals  insulin lispro (ADMELOG) corrective regimen sliding scale   SubCutaneous at bedtime  insulin lispro Injectable (ADMELOG) 10 Unit(s) SubCutaneous before dinner  insulin lispro Injectable (ADMELOG) 10 Unit(s) SubCutaneous before breakfast  insulin lispro Injectable (ADMELOG) 6 Unit(s) SubCutaneous before lunch  naloxegol 25 milliGRAM(s) Oral daily  oxyCODONE  ER Tablet 10 milliGRAM(s) Oral every 12 hours  polyethylene glycol 3350 17 Gram(s) Oral at bedtime  pregabalin 200 milliGRAM(s) Oral three times a day  senna 2 Tablet(s) Oral at bedtime  spironolactone 25 milliGRAM(s) Oral daily  tiZANidine 4 milliGRAM(s) Oral every 6 hours    MEDICATIONS  (PRN):  benzonatate 100 milliGRAM(s) Oral three times a day PRN Cough  dextrose Oral Gel 15 Gram(s) Oral once PRN Blood Glucose LESS THAN 70 milliGRAM(s)/deciliter  melatonin 5 milliGRAM(s) Oral at bedtime PRN Sleep  oxyCODONE    IR 5 milliGRAM(s) Oral every 4 hours PRN Moderate Pain (4 - 6)  oxyCODONE    IR 10 milliGRAM(s) Oral every 4 hours PRN Severe Pain (7 - 10)

## 2024-03-22 NOTE — DIETITIAN INITIAL EVALUATION ADULT - NSICDXPASTSURGICALHX_GEN_ALL_CORE_FT
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

## 2024-03-22 NOTE — PROGRESS NOTE ADULT - SUBJECTIVE AND OBJECTIVE BOX
[facility] Progress Note    MAR, chart notes, fingersticks and labs reviewed    Subjective:    Objective  Vital Signs  T(C): 37.1 (03-22-24 @ 09:08), Max: 37.1 (03-22-24 @ 09:08)  HR: 66 (03-22-24 @ 09:08) (65 - 66)  BP: 118/71 (03-22-24 @ 09:08) (92/58 - 118/71)  RR: 18 (03-22-24 @ 09:08) (18 - 18)  SpO2: 95% (03-22-24 @ 09:08) (93% - 98%)    Physical Exam  Gen: NAD, alert, awake  HEENT: NC/AT, EOMI  Neck: supple  Chest: breathing comfortably  Heart: +s1 +s2    Medications  atorvastatin 20 milliGRAM(s) Oral at bedtime  benzonatate 100 milliGRAM(s) Oral three times a day PRN  buMETAnide 1 milliGRAM(s) Oral daily  carvedilol 3.125 milliGRAM(s) Oral every 12 hours  dextrose 5%. 1000 milliLiter(s) IV Continuous <Continuous>  dextrose 5%. 1000 milliLiter(s) IV Continuous <Continuous>  dextrose 50% Injectable 25 Gram(s) IV Push once  dextrose 50% Injectable 12.5 Gram(s) IV Push once  dextrose 50% Injectable 25 Gram(s) IV Push once  dextrose Oral Gel 15 Gram(s) Oral once PRN  enoxaparin Injectable 40 milliGRAM(s) SubCutaneous every 12 hours  famotidine    Tablet 20 milliGRAM(s) Oral daily  glucagon  Injectable 1 milliGRAM(s) IntraMuscular once  insulin glargine Injectable (LANTUS) 40 Unit(s) SubCutaneous at bedtime  insulin lispro (ADMELOG) corrective regimen sliding scale   SubCutaneous three times a day before meals  insulin lispro (ADMELOG) corrective regimen sliding scale   SubCutaneous at bedtime  insulin lispro Injectable (ADMELOG) 10 Unit(s) SubCutaneous before dinner  insulin lispro Injectable (ADMELOG) 10 Unit(s) SubCutaneous before breakfast  insulin lispro Injectable (ADMELOG) 6 Unit(s) SubCutaneous before lunch  melatonin 5 milliGRAM(s) Oral at bedtime PRN  naloxegol 25 milliGRAM(s) Oral daily  oxyCODONE    IR 5 milliGRAM(s) Oral every 4 hours PRN  oxyCODONE    IR 10 milliGRAM(s) Oral every 4 hours PRN  oxyCODONE  ER Tablet 10 milliGRAM(s) Oral every 12 hours  polyethylene glycol 3350 17 Gram(s) Oral at bedtime  pregabalin 200 milliGRAM(s) Oral three times a day  senna 2 Tablet(s) Oral at bedtime  spironolactone 25 milliGRAM(s) Oral daily  tiZANidine 4 milliGRAM(s) Oral every 6 hours    Pertinent labs/Imaging  POCT Blood Glucose.: 223 mg/dL (03-22-24 @ 08:13)  POCT Blood Glucose.: 246 mg/dL (03-21-24 @ 22:03)  POCT Blood Glucose.: 213 mg/dL (03-21-24 @ 17:02)  POCT Blood Glucose.: 332 mg/dL (03-21-24 @ 12:02)    eGFR: 91 mL/min/1.73m2 (03-22-24 @ 09:21)  eGFR: 74 mL/min/1.73m2 (03-21-24 @ 07:32)  eGFR: 95 mL/min/1.73m2 (03-20-24 @ 11:47)       [facility] Progress Note    MAR, chart notes, fingersticks and labs reviewed    Subjective: feels better, eating well    Objective  Vital Signs  T(C): 37.1 (03-22-24 @ 09:08), Max: 37.1 (03-22-24 @ 09:08)  HR: 66 (03-22-24 @ 09:08) (65 - 66)  BP: 118/71 (03-22-24 @ 09:08) (92/58 - 118/71)  RR: 18 (03-22-24 @ 09:08) (18 - 18)  SpO2: 95% (03-22-24 @ 09:08) (93% - 98%)    Physical Exam  Gen: NAD, alert, awake, out of bed to chair  HEENT: NC/AT, EOMI  Neck: supple  Chest: breathing comfortably  Heart: +s1 +s2    Medications  atorvastatin 20 milliGRAM(s) Oral at bedtime  benzonatate 100 milliGRAM(s) Oral three times a day PRN  buMETAnide 1 milliGRAM(s) Oral daily  carvedilol 3.125 milliGRAM(s) Oral every 12 hours  dextrose 5%. 1000 milliLiter(s) IV Continuous <Continuous>  dextrose 5%. 1000 milliLiter(s) IV Continuous <Continuous>  dextrose 50% Injectable 25 Gram(s) IV Push once  dextrose 50% Injectable 12.5 Gram(s) IV Push once  dextrose 50% Injectable 25 Gram(s) IV Push once  dextrose Oral Gel 15 Gram(s) Oral once PRN  enoxaparin Injectable 40 milliGRAM(s) SubCutaneous every 12 hours  famotidine    Tablet 20 milliGRAM(s) Oral daily  glucagon  Injectable 1 milliGRAM(s) IntraMuscular once  insulin glargine Injectable (LANTUS) 40 Unit(s) SubCutaneous at bedtime  insulin lispro (ADMELOG) corrective regimen sliding scale   SubCutaneous three times a day before meals  insulin lispro (ADMELOG) corrective regimen sliding scale   SubCutaneous at bedtime  insulin lispro Injectable (ADMELOG) 10 Unit(s) SubCutaneous before dinner  insulin lispro Injectable (ADMELOG) 10 Unit(s) SubCutaneous before breakfast  insulin lispro Injectable (ADMELOG) 6 Unit(s) SubCutaneous before lunch  melatonin 5 milliGRAM(s) Oral at bedtime PRN  naloxegol 25 milliGRAM(s) Oral daily  oxyCODONE    IR 5 milliGRAM(s) Oral every 4 hours PRN  oxyCODONE    IR 10 milliGRAM(s) Oral every 4 hours PRN  oxyCODONE  ER Tablet 10 milliGRAM(s) Oral every 12 hours  polyethylene glycol 3350 17 Gram(s) Oral at bedtime  pregabalin 200 milliGRAM(s) Oral three times a day  senna 2 Tablet(s) Oral at bedtime  spironolactone 25 milliGRAM(s) Oral daily  tiZANidine 4 milliGRAM(s) Oral every 6 hours    Pertinent labs/Imaging  POCT Blood Glucose.: 223 mg/dL (03-22-24 @ 08:13)  POCT Blood Glucose.: 246 mg/dL (03-21-24 @ 22:03)  POCT Blood Glucose.: 213 mg/dL (03-21-24 @ 17:02)  POCT Blood Glucose.: 332 mg/dL (03-21-24 @ 12:02)    eGFR: 91 mL/min/1.73m2 (03-22-24 @ 09:21)  eGFR: 74 mL/min/1.73m2 (03-21-24 @ 07:32)  eGFR: 95 mL/min/1.73m2 (03-20-24 @ 11:47)

## 2024-03-23 DIAGNOSIS — R05.9 COUGH, UNSPECIFIED: ICD-10-CM

## 2024-03-23 LAB
ANION GAP SERPL CALC-SCNC: 15 MMOL/L — SIGNIFICANT CHANGE UP (ref 5–17)
BUN SERPL-MCNC: 12 MG/DL — SIGNIFICANT CHANGE UP (ref 7–23)
CALCIUM SERPL-MCNC: 9 MG/DL — SIGNIFICANT CHANGE UP (ref 8.4–10.5)
CHLORIDE SERPL-SCNC: 97 MMOL/L — SIGNIFICANT CHANGE UP (ref 96–108)
CO2 SERPL-SCNC: 21 MMOL/L — LOW (ref 22–31)
CREAT SERPL-MCNC: 0.86 MG/DL — SIGNIFICANT CHANGE UP (ref 0.5–1.3)
EGFR: 81 ML/MIN/1.73M2 — SIGNIFICANT CHANGE UP
FLUAV AG NPH QL: SIGNIFICANT CHANGE UP
FLUBV AG NPH QL: SIGNIFICANT CHANGE UP
GLUCOSE BLDC GLUCOMTR-MCNC: 200 MG/DL — HIGH (ref 70–99)
GLUCOSE BLDC GLUCOMTR-MCNC: 212 MG/DL — HIGH (ref 70–99)
GLUCOSE BLDC GLUCOMTR-MCNC: 214 MG/DL — HIGH (ref 70–99)
GLUCOSE BLDC GLUCOMTR-MCNC: 220 MG/DL — HIGH (ref 70–99)
GLUCOSE SERPL-MCNC: 218 MG/DL — HIGH (ref 70–99)
GRAM STN FLD: ABNORMAL
HCT VFR BLD CALC: 33.7 % — LOW (ref 34.5–45)
HGB BLD-MCNC: 10.6 G/DL — LOW (ref 11.5–15.5)
MCHC RBC-ENTMCNC: 29 PG — SIGNIFICANT CHANGE UP (ref 27–34)
MCHC RBC-ENTMCNC: 31.5 GM/DL — LOW (ref 32–36)
MCV RBC AUTO: 92.3 FL — SIGNIFICANT CHANGE UP (ref 80–100)
NRBC # BLD: 0 /100 WBCS — SIGNIFICANT CHANGE UP (ref 0–0)
PLATELET # BLD AUTO: 427 K/UL — HIGH (ref 150–400)
POTASSIUM SERPL-MCNC: 4.6 MMOL/L — SIGNIFICANT CHANGE UP (ref 3.5–5.3)
POTASSIUM SERPL-SCNC: 4.6 MMOL/L — SIGNIFICANT CHANGE UP (ref 3.5–5.3)
RBC # BLD: 3.65 M/UL — LOW (ref 3.8–5.2)
RBC # FLD: 13 % — SIGNIFICANT CHANGE UP (ref 10.3–14.5)
RSV RNA NPH QL NAA+NON-PROBE: SIGNIFICANT CHANGE UP
SARS-COV-2 RNA SPEC QL NAA+PROBE: SIGNIFICANT CHANGE UP
SODIUM SERPL-SCNC: 133 MMOL/L — LOW (ref 135–145)
SPECIMEN SOURCE: SIGNIFICANT CHANGE UP
WBC # BLD: 8.58 K/UL — SIGNIFICANT CHANGE UP (ref 3.8–10.5)
WBC # FLD AUTO: 8.58 K/UL — SIGNIFICANT CHANGE UP (ref 3.8–10.5)

## 2024-03-23 PROCEDURE — 99233 SBSQ HOSP IP/OBS HIGH 50: CPT

## 2024-03-23 PROCEDURE — 71045 X-RAY EXAM CHEST 1 VIEW: CPT | Mod: 26

## 2024-03-23 PROCEDURE — 71250 CT THORAX DX C-: CPT | Mod: 26

## 2024-03-23 RX ORDER — LACTULOSE 10 G/15ML
20 SOLUTION ORAL ONCE
Refills: 0 | Status: COMPLETED | OUTPATIENT
Start: 2024-03-23 | End: 2024-03-23

## 2024-03-23 RX ORDER — INSULIN GLARGINE 100 [IU]/ML
45 INJECTION, SOLUTION SUBCUTANEOUS AT BEDTIME
Refills: 0 | Status: DISCONTINUED | OUTPATIENT
Start: 2024-03-23 | End: 2024-03-24

## 2024-03-23 RX ORDER — IPRATROPIUM/ALBUTEROL SULFATE 18-103MCG
3 AEROSOL WITH ADAPTER (GRAM) INHALATION ONCE
Refills: 0 | Status: COMPLETED | OUTPATIENT
Start: 2024-03-23 | End: 2024-03-23

## 2024-03-23 RX ORDER — INSULIN LISPRO 100/ML
15 VIAL (ML) SUBCUTANEOUS
Refills: 0 | Status: DISCONTINUED | OUTPATIENT
Start: 2024-03-23 | End: 2024-03-25

## 2024-03-23 RX ORDER — CEFTRIAXONE 500 MG/1
1000 INJECTION, POWDER, FOR SOLUTION INTRAMUSCULAR; INTRAVENOUS EVERY 24 HOURS
Refills: 0 | Status: DISCONTINUED | OUTPATIENT
Start: 2024-03-23 | End: 2024-03-24

## 2024-03-23 RX ADMIN — Medication 5 MILLIGRAM(S): at 01:43

## 2024-03-23 RX ADMIN — POLYETHYLENE GLYCOL 3350 17 GRAM(S): 17 POWDER, FOR SOLUTION ORAL at 17:59

## 2024-03-23 RX ADMIN — Medication 6 UNIT(S): at 12:52

## 2024-03-23 RX ADMIN — CARVEDILOL PHOSPHATE 3.12 MILLIGRAM(S): 80 CAPSULE, EXTENDED RELEASE ORAL at 18:01

## 2024-03-23 RX ADMIN — SENNA PLUS 2 TABLET(S): 8.6 TABLET ORAL at 21:52

## 2024-03-23 RX ADMIN — ENOXAPARIN SODIUM 40 MILLIGRAM(S): 100 INJECTION SUBCUTANEOUS at 18:05

## 2024-03-23 RX ADMIN — BUMETANIDE 1 MILLIGRAM(S): 0.25 INJECTION INTRAMUSCULAR; INTRAVENOUS at 05:37

## 2024-03-23 RX ADMIN — Medication 5 MILLIGRAM(S): at 23:05

## 2024-03-23 RX ADMIN — FAMOTIDINE 20 MILLIGRAM(S): 10 INJECTION INTRAVENOUS at 11:44

## 2024-03-23 RX ADMIN — Medication 200 MILLIGRAM(S): at 05:37

## 2024-03-23 RX ADMIN — CEFTRIAXONE 100 MILLIGRAM(S): 500 INJECTION, POWDER, FOR SOLUTION INTRAMUSCULAR; INTRAVENOUS at 21:53

## 2024-03-23 RX ADMIN — SPIRONOLACTONE 25 MILLIGRAM(S): 25 TABLET, FILM COATED ORAL at 05:37

## 2024-03-23 RX ADMIN — Medication 100 MILLIGRAM(S): at 01:43

## 2024-03-23 RX ADMIN — LACTULOSE 20 GRAM(S): 10 SOLUTION ORAL at 11:48

## 2024-03-23 RX ADMIN — Medication 15 UNIT(S): at 17:59

## 2024-03-23 RX ADMIN — OXYCODONE HYDROCHLORIDE 10 MILLIGRAM(S): 5 TABLET ORAL at 16:25

## 2024-03-23 RX ADMIN — NALOXEGOL OXALATE 25 MILLIGRAM(S): 12.5 TABLET, FILM COATED ORAL at 11:44

## 2024-03-23 RX ADMIN — INSULIN GLARGINE 45 UNIT(S): 100 INJECTION, SOLUTION SUBCUTANEOUS at 21:52

## 2024-03-23 RX ADMIN — Medication 16 UNIT(S): at 08:51

## 2024-03-23 RX ADMIN — CARVEDILOL PHOSPHATE 3.12 MILLIGRAM(S): 80 CAPSULE, EXTENDED RELEASE ORAL at 05:37

## 2024-03-23 RX ADMIN — OXYCODONE HYDROCHLORIDE 10 MILLIGRAM(S): 5 TABLET ORAL at 05:36

## 2024-03-23 RX ADMIN — Medication 4: at 17:59

## 2024-03-23 RX ADMIN — TIZANIDINE 4 MILLIGRAM(S): 4 TABLET ORAL at 21:52

## 2024-03-23 RX ADMIN — OXYCODONE HYDROCHLORIDE 10 MILLIGRAM(S): 5 TABLET ORAL at 01:43

## 2024-03-23 RX ADMIN — Medication 4: at 12:51

## 2024-03-23 RX ADMIN — ATORVASTATIN CALCIUM 20 MILLIGRAM(S): 80 TABLET, FILM COATED ORAL at 21:52

## 2024-03-23 RX ADMIN — TIZANIDINE 4 MILLIGRAM(S): 4 TABLET ORAL at 08:51

## 2024-03-23 RX ADMIN — ENOXAPARIN SODIUM 40 MILLIGRAM(S): 100 INJECTION SUBCUTANEOUS at 05:37

## 2024-03-23 RX ADMIN — OXYCODONE HYDROCHLORIDE 10 MILLIGRAM(S): 5 TABLET ORAL at 18:05

## 2024-03-23 RX ADMIN — TIZANIDINE 4 MILLIGRAM(S): 4 TABLET ORAL at 15:25

## 2024-03-23 RX ADMIN — Medication 4: at 08:49

## 2024-03-23 RX ADMIN — OXYCODONE HYDROCHLORIDE 10 MILLIGRAM(S): 5 TABLET ORAL at 15:25

## 2024-03-23 RX ADMIN — TIZANIDINE 4 MILLIGRAM(S): 4 TABLET ORAL at 03:02

## 2024-03-23 RX ADMIN — OXYCODONE HYDROCHLORIDE 10 MILLIGRAM(S): 5 TABLET ORAL at 08:14

## 2024-03-23 RX ADMIN — Medication 200 MILLIGRAM(S): at 14:05

## 2024-03-23 RX ADMIN — Medication 100 MILLIGRAM(S): at 22:13

## 2024-03-23 RX ADMIN — Medication 3 MILLILITER(S): at 07:00

## 2024-03-23 RX ADMIN — Medication 100 MILLIGRAM(S): at 18:00

## 2024-03-23 RX ADMIN — OXYCODONE HYDROCHLORIDE 10 MILLIGRAM(S): 5 TABLET ORAL at 09:14

## 2024-03-23 RX ADMIN — Medication 200 MILLIGRAM(S): at 21:52

## 2024-03-23 RX ADMIN — OXYCODONE HYDROCHLORIDE 10 MILLIGRAM(S): 5 TABLET ORAL at 21:51

## 2024-03-23 NOTE — CONSULT NOTE ADULT - ASSESSMENT
53 F w/ multiple medical comorbidities found to have incidental pneumopericardium.    -No acute surgical intervention  -Will f/u CT Chest results  -Continue to search for source of cough  -Rest of care per primary'    D/w Dr. Juan Grayson MD  PGY-3  Thoracic Surgery 53 F w/ multiple medical comorbidities found to have incidental pneumopericardium.    -No acute surgical intervention  -Will f/u CT Chest results- reviewed and w/o evidence of pneumopericardium  -Continue to search for source of cough  -Rest of care per primary'    D/w Dr. Juan Grayson MD  PGY-3  Thoracic Surgery

## 2024-03-23 NOTE — PROGRESS NOTE ADULT - SUBJECTIVE AND OBJECTIVE BOX
[facility] Progress Note    MAR, chart notes, fingersticks and labs reviewed    Subjective: seen early this morning, had mac n cheese last night for dinner, otherwise feeling okay    Objective  Vital Signs  T(C): 37.4 (03-23-24 @ 07:53), Max: 37.4 (03-23-24 @ 07:53)  HR: 61 (03-23-24 @ 07:53) (57 - 73)  BP: 106/71 (03-23-24 @ 07:53) (103/61 - 135/77)  RR: 18 (03-23-24 @ 07:53) (18 - 18)  SpO2: 95% (03-23-24 @ 07:53) (95% - 97%)    Physical Exam  Gen: NAD, alert, awake  HEENT: NC/AT, EOMI  Neck: supple  Chest: breathing comfortably  Heart: +s1 +s2    Medications  atorvastatin 20 milliGRAM(s) Oral at bedtime  benzonatate 100 milliGRAM(s) Oral three times a day PRN  bisacodyl Suppository 10 milliGRAM(s) Rectal daily PRN  buMETAnide 1 milliGRAM(s) Oral daily  carvedilol 3.125 milliGRAM(s) Oral every 12 hours  dextrose 5%. 1000 milliLiter(s) IV Continuous <Continuous>  dextrose 5%. 1000 milliLiter(s) IV Continuous <Continuous>  dextrose 50% Injectable 25 Gram(s) IV Push once  dextrose 50% Injectable 12.5 Gram(s) IV Push once  dextrose 50% Injectable 25 Gram(s) IV Push once  dextrose Oral Gel 15 Gram(s) Oral once PRN  enoxaparin Injectable 40 milliGRAM(s) SubCutaneous every 12 hours  famotidine    Tablet 20 milliGRAM(s) Oral daily  glucagon  Injectable 1 milliGRAM(s) IntraMuscular once  insulin glargine Injectable (LANTUS) 45 Unit(s) SubCutaneous at bedtime  insulin lispro (ADMELOG) corrective regimen sliding scale   SubCutaneous three times a day before meals  insulin lispro (ADMELOG) corrective regimen sliding scale   SubCutaneous at bedtime  insulin lispro Injectable (ADMELOG) 15 Unit(s) SubCutaneous before dinner  insulin lispro Injectable (ADMELOG) 16 Unit(s) SubCutaneous before breakfast  insulin lispro Injectable (ADMELOG) 6 Unit(s) SubCutaneous before lunch  melatonin 5 milliGRAM(s) Oral at bedtime PRN  naloxegol 25 milliGRAM(s) Oral daily  oxyCODONE    IR 5 milliGRAM(s) Oral every 4 hours PRN  oxyCODONE    IR 10 milliGRAM(s) Oral every 4 hours PRN  oxyCODONE  ER Tablet 10 milliGRAM(s) Oral every 12 hours  polyethylene glycol 3350 17 Gram(s) Oral two times a day  pregabalin 200 milliGRAM(s) Oral three times a day  senna 2 Tablet(s) Oral at bedtime  spironolactone 25 milliGRAM(s) Oral daily  tiZANidine 4 milliGRAM(s) Oral every 6 hours    Pertinent labs/Imaging  POCT Blood Glucose.: 212 mg/dL (03-23-24 @ 12:27)  POCT Blood Glucose.: 214 mg/dL (03-23-24 @ 08:22)  POCT Blood Glucose.: 296 mg/dL (03-22-24 @ 21:01)  POCT Blood Glucose.: 204 mg/dL (03-22-24 @ 17:17)    eGFR: 81 mL/min/1.73m2 (03-23-24 @ 10:56)  eGFR: 91 mL/min/1.73m2 (03-22-24 @ 09:21)
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
## 2024-03-23 NOTE — PROGRESS NOTE ADULT - PROBLEM SELECTOR PLAN 2
Detail Level: Simple Persistent cough. Afebrile. No leukocytosis. RVP neg. Placed on 3L NC; no documented hypoxia on RA.   Sputum Cx  CXR 3/23 - pneumopericardium. CT Chest ordered. Thoracic surgery consulted. F/u recs. Persistent cough. Afebrile. No leukocytosis. RVP neg. Placed on 3L NC; no documented hypoxia on RA.   Sputum Cx  CXR 3/23 - pneumopericardium. CT Chest ordered. D/w CT Surgery team - rec CT Chest and thoracic surgery consult. Thoracic consulted. F/u recs.

## 2024-03-23 NOTE — CHART NOTE - NSCHARTNOTEFT_GEN_A_CORE
Writer received call for radiologist reporting incidental  findings of pneumopericardium on CXR that was done this AM. Seen and examined pt at this bedside - NAD reported ( S1, S2 RRR, lungs CTA bilaterally); Pt only c/o  on going cough x3 days. Pt seen by CT surgery,  no surgical intervention. ECHO pending. RVP neg, CT chest ordered to further assess cough, c/w tessalon.
52 y/o F with BMI 53.4, history - HF with preserved EF, S/P past cardiac cath with normal coronaries but undifferentiated severe pulmonary HTN, multiple diuretic regimens (Bumex, Diamox, Aldactone) for chronic lower leg edema, type 2 DM, essential HTN, presumed GERD, idiopathic intracranial HTN (diagnosed 2020), complex regional pain syndrome with past spinal cord stimulator implant placed at [facility] with complications of multifocal PNA 7/2022 w/ acute respiratory failure, ARDS and with BiPAP>intubation/extubation at [place], Oct 2022 at [facility] with dyspnoea and B/L LE oedema with apparent diastolic HF presentation, referral for elective left and right heart cath at [place] in Nov 2022 with normal coronaries but with severe pulmonary HTN on RHC, Dec 2023 with admission at [facility] for HA with nondiagnostic CTT head, MRI precluded due to patient's spinal implant, with patient seen by Neuro and S/P IR LP at the time with diagnosis of idiopathic intracranial HTN (presumably maintained on Diamox PRN?), past chronic kidney injury.  Comes to [facility] for diffuse body pain, centered on the RIGHT trunk, partial relief with IV Dilaudid, received IV ketamine by the ER attending earlier. (19 Mar 2024 23:37)    Current out- patient pain regimen: Tizanidine 2 mg Q6h; a couple of recent short Rxs for Dilaudid 4 mg and Lyrica 100 mg however historically pt has been on varied doses - most recent Oxy 15 and 20 mg BID and Lyrica 100 Q6h and TID  Out Patient Pain Management provider: None    Pt w/ SCS for intercostal neuralgia but also w/ chronic right mid to upper back pain, difficult to ascertain why she has not followed up w/ pain management.  Current pain scores 7/10 down to 2/10.    Contiue OxyContin 10 mg BID.  Continue Oxycodone 5 mg every 4 hrs PRN moderate pain and 10 mg every 4 hrs PRN severe pain.  Continue Tizanidine 4 mg every 6 hrs.  Continue Lyrica 200 mg TID- current CrCl is 176.  Monitor for sedation, respiratory depression.   Bowel Regimen PRN.    Out-patient pain practice list provided for pain management after discharge, strongly encouraged.  Would discharge pt on Oxy IR 10 mg QID PRN x 5 days, lyrica and tizanidine.  Narcan Rescue Kit on discharge (Naloxone 4 mg/0.1 ml nasal spray - 1 spray q 2-3 minutes alternating between nostrils).    Signing off.      [facility]  [phone]

## 2024-03-23 NOTE — PROGRESS NOTE ADULT - SUBJECTIVE AND OBJECTIVE BOX
SUBJECTIVE / OVERNIGHT EVENTS:  Pt seen and evaluated at bedside. Still with back pain but improving. Reports cough, sputum productive.   Failed TOV, lara replaced.     MEDICATIONS  (STANDING):  atorvastatin 20 milliGRAM(s) Oral at bedtime  carvedilol 3.125 milliGRAM(s) Oral every 12 hours  dextrose 5%. 1000 milliLiter(s) (100 mL/Hr) IV Continuous <Continuous>  dextrose 5%. 1000 milliLiter(s) (50 mL/Hr) IV Continuous <Continuous>  dextrose 50% Injectable 25 Gram(s) IV Push once  dextrose 50% Injectable 12.5 Gram(s) IV Push once  dextrose 50% Injectable 25 Gram(s) IV Push once  enoxaparin Injectable 40 milliGRAM(s) SubCutaneous every 12 hours  famotidine    Tablet 20 milliGRAM(s) Oral daily  glucagon  Injectable 1 milliGRAM(s) IntraMuscular once  HYDROmorphone  Injectable 1 milliGRAM(s) IV Push once  insulin glargine Injectable (LANTUS) 30 Unit(s) SubCutaneous at bedtime  insulin lispro (ADMELOG) corrective regimen sliding scale   SubCutaneous three times a day before meals  insulin lispro (ADMELOG) corrective regimen sliding scale   SubCutaneous at bedtime  insulin lispro Injectable (ADMELOG) 6 Unit(s) SubCutaneous three times a day before meals  lisinopril 5 milliGRAM(s) Oral daily  naloxegol 25 milliGRAM(s) Oral daily  oxyCODONE  ER Tablet 10 milliGRAM(s) Oral every 12 hours  pregabalin 200 milliGRAM(s) Oral three times a day  tiZANidine 4 milliGRAM(s) Oral every 6 hours    MEDICATIONS  (PRN):  benzonatate 100 milliGRAM(s) Oral three times a day PRN Cough  dextrose Oral Gel 15 Gram(s) Oral once PRN Blood Glucose LESS THAN 70 milliGRAM(s)/deciliter  melatonin 5 milliGRAM(s) Oral at bedtime PRN Sleep      I&O's Summary    19 Mar 2024 07:01  -  20 Mar 2024 07:00  --------------------------------------------------------  IN: 250 mL / OUT: 750 mL / NET: -500 mL    20 Mar 2024 07:01  -  20 Mar 2024 15:16  --------------------------------------------------------  IN: 250 mL / OUT: 0 mL / NET: 250 mL        PHYSICAL EXAM:  Vital Signs Last 24 Hrs  T(C): 36.6 (20 Mar 2024 12:23), Max: 36.9 (20 Mar 2024 00:31)  T(F): 97.9 (20 Mar 2024 12:23), Max: 98.5 (20 Mar 2024 00:31)  HR: 69 (20 Mar 2024 12:23) (69 - 72)  BP: 104/66 (20 Mar 2024 12:23) (104/66 - 134/82)  BP(mean): --  RR: 18 (20 Mar 2024 12:23) (18 - 22)  SpO2: 94% (20 Mar 2024 12:23) (94% - 96%)    Parameters below as of 20 Mar 2024 12:23  Patient On (Oxygen Delivery Method): room air      CONSTITUTIONAL: NAD  EYES: EOMI; conjunctiva and sclera clear  ENMT: Moist oral mucosa, no pharyngeal injection or exudates  NECK: Supple, trachea midline   RESPIRATORY: Normal respiratory effort; lungs are clear to auscultation bilaterally  CARDIOVASCULAR: Regular rate and rhythm, normal S1 and S2  ABDOMEN: Nontender to palpation, normoactive bowel sounds, no rebound/guarding  MUSCULOSKELETAL: moves all extremities   PSYCH: A+O to person, place, and time  NEUROLOGY: grossly nonfocal     LABS:                         10.6   8.58  )-----------( 427      ( 23 Mar 2024 10:56 )             33.7     03-23    133<L>  |  97  |  12  ----------------------------<  218<H>  4.6   |  21<L>  |  0.86    Ca    9.0      23 Mar 2024 10:56  Mg     2.0     03-22        Urinalysis Basic - ( 23 Mar 2024 10:56 )    Color: x / Appearance: x / SG: x / pH: x  Gluc: 218 mg/dL / Ketone: x  / Bili: x / Urobili: x   Blood: x / Protein: x / Nitrite: x   Leuk Esterase: x / RBC: x / WBC x   Sq Epi: x / Non Sq Epi: x / Bacteria: x

## 2024-03-23 NOTE — PROGRESS NOTE ADULT - ASSESSMENT
53y old Female with history of T2DM, CHF, Idiopathic intracranial hypertension, HTN, HLD here with acute on chronic back pain.     1. T2DM with hyperglycemia    - Continue Lantus 40 units at night  - Continue Admelog 16 units with breakfast, 6 units with lunch and increase to 15 units with dinner  - Continue moderate Admelog correctional scale before meals and bedtime  - Monitor FS before meals and bedtime  - Follow hospital hypoglycemic protocol    2. CHF  - on Bumex and Coreg  - Consider Jardiance/Farxiga outpatient    3. HLD  - Continue Atorvastatin 20 mg daily    Will continue to follow.    Melissa Murray MD  [facility]    [address] [place] [address]    T [phone]  F [phone]    53y old Female with history of T2DM, CHF, Idiopathic intracranial hypertension, HTN, HLD here with acute on chronic back pain.     1. T2DM with hyperglycemia  - improved numbers, will increase Lantus and dinner dose of Admelog  - Increase Lantus to 45 units at night  - Continue Admelog 16 units with breakfast, 6 units with lunch and increase to 15 units with dinner  - Continue moderate Admelog correctional scale before meals and bedtime  - Monitor FS before meals and bedtime  - Follow hospital hypoglycemic protocol    2. CHF  - on Bumex and Coreg  - Consider Jardiance/Farxiga outpatient    3. HLD  - Continue Atorvastatin 20 mg daily    Will continue to follow.    Melissa Murray MD  [facility]    [address] [place] [address]    T [phone]  F [phone]

## 2024-03-24 LAB
ANION GAP SERPL CALC-SCNC: 14 MMOL/L — SIGNIFICANT CHANGE UP (ref 5–17)
BUN SERPL-MCNC: 16 MG/DL — SIGNIFICANT CHANGE UP (ref 7–23)
CALCIUM SERPL-MCNC: 9.3 MG/DL — SIGNIFICANT CHANGE UP (ref 8.4–10.5)
CHLORIDE SERPL-SCNC: 96 MMOL/L — SIGNIFICANT CHANGE UP (ref 96–108)
CO2 SERPL-SCNC: 23 MMOL/L — SIGNIFICANT CHANGE UP (ref 22–31)
CREAT SERPL-MCNC: 0.83 MG/DL — SIGNIFICANT CHANGE UP (ref 0.5–1.3)
EGFR: 84 ML/MIN/1.73M2 — SIGNIFICANT CHANGE UP
GLUCOSE BLDC GLUCOMTR-MCNC: 164 MG/DL — HIGH (ref 70–99)
GLUCOSE BLDC GLUCOMTR-MCNC: 231 MG/DL — HIGH (ref 70–99)
GLUCOSE BLDC GLUCOMTR-MCNC: 255 MG/DL — HIGH (ref 70–99)
GLUCOSE BLDC GLUCOMTR-MCNC: 259 MG/DL — HIGH (ref 70–99)
GLUCOSE BLDC GLUCOMTR-MCNC: 326 MG/DL — HIGH (ref 70–99)
GLUCOSE SERPL-MCNC: 260 MG/DL — HIGH (ref 70–99)
HCT VFR BLD CALC: 35 % — SIGNIFICANT CHANGE UP (ref 34.5–45)
HGB BLD-MCNC: 11.2 G/DL — LOW (ref 11.5–15.5)
MCHC RBC-ENTMCNC: 29.3 PG — SIGNIFICANT CHANGE UP (ref 27–34)
MCHC RBC-ENTMCNC: 32 GM/DL — SIGNIFICANT CHANGE UP (ref 32–36)
MCV RBC AUTO: 91.6 FL — SIGNIFICANT CHANGE UP (ref 80–100)
NRBC # BLD: 0 /100 WBCS — SIGNIFICANT CHANGE UP (ref 0–0)
PLATELET # BLD AUTO: 433 K/UL — HIGH (ref 150–400)
POTASSIUM SERPL-MCNC: 4.1 MMOL/L — SIGNIFICANT CHANGE UP (ref 3.5–5.3)
POTASSIUM SERPL-SCNC: 4.1 MMOL/L — SIGNIFICANT CHANGE UP (ref 3.5–5.3)
RBC # BLD: 3.82 M/UL — SIGNIFICANT CHANGE UP (ref 3.8–5.2)
RBC # FLD: 13.1 % — SIGNIFICANT CHANGE UP (ref 10.3–14.5)
SODIUM SERPL-SCNC: 133 MMOL/L — LOW (ref 135–145)
WBC # BLD: 7.74 K/UL — SIGNIFICANT CHANGE UP (ref 3.8–10.5)
WBC # FLD AUTO: 7.74 K/UL — SIGNIFICANT CHANGE UP (ref 3.8–10.5)

## 2024-03-24 PROCEDURE — 99233 SBSQ HOSP IP/OBS HIGH 50: CPT

## 2024-03-24 RX ORDER — BUMETANIDE 0.25 MG/ML
1 INJECTION INTRAMUSCULAR; INTRAVENOUS ONCE
Refills: 0 | Status: COMPLETED | OUTPATIENT
Start: 2024-03-24 | End: 2024-03-24

## 2024-03-24 RX ORDER — IPRATROPIUM/ALBUTEROL SULFATE 18-103MCG
3 AEROSOL WITH ADAPTER (GRAM) INHALATION ONCE
Refills: 0 | Status: COMPLETED | OUTPATIENT
Start: 2024-03-24 | End: 2024-03-24

## 2024-03-24 RX ORDER — INSULIN LISPRO 100/ML
18 VIAL (ML) SUBCUTANEOUS
Refills: 0 | Status: DISCONTINUED | OUTPATIENT
Start: 2024-03-24 | End: 2024-03-24

## 2024-03-24 RX ORDER — LACTULOSE 10 G/15ML
10 SOLUTION ORAL ONCE
Refills: 0 | Status: COMPLETED | OUTPATIENT
Start: 2024-03-24 | End: 2024-03-24

## 2024-03-24 RX ORDER — INSULIN LISPRO 100/ML
8 VIAL (ML) SUBCUTANEOUS
Refills: 0 | Status: DISCONTINUED | OUTPATIENT
Start: 2024-03-24 | End: 2024-03-25

## 2024-03-24 RX ORDER — BUMETANIDE 0.25 MG/ML
1 INJECTION INTRAMUSCULAR; INTRAVENOUS DAILY
Refills: 0 | Status: DISCONTINUED | OUTPATIENT
Start: 2024-03-25 | End: 2024-03-26

## 2024-03-24 RX ORDER — IPRATROPIUM/ALBUTEROL SULFATE 18-103MCG
3 AEROSOL WITH ADAPTER (GRAM) INHALATION EVERY 6 HOURS
Refills: 0 | Status: DISCONTINUED | OUTPATIENT
Start: 2024-03-24 | End: 2024-03-26

## 2024-03-24 RX ORDER — INSULIN LISPRO 100/ML
22 VIAL (ML) SUBCUTANEOUS
Refills: 0 | Status: DISCONTINUED | OUTPATIENT
Start: 2024-03-25 | End: 2024-03-25

## 2024-03-24 RX ORDER — INSULIN GLARGINE 100 [IU]/ML
52 INJECTION, SOLUTION SUBCUTANEOUS AT BEDTIME
Refills: 0 | Status: DISCONTINUED | OUTPATIENT
Start: 2024-03-24 | End: 2024-03-25

## 2024-03-24 RX ADMIN — Medication 2: at 17:03

## 2024-03-24 RX ADMIN — SENNA PLUS 2 TABLET(S): 8.6 TABLET ORAL at 21:48

## 2024-03-24 RX ADMIN — POLYETHYLENE GLYCOL 3350 17 GRAM(S): 17 POWDER, FOR SOLUTION ORAL at 18:07

## 2024-03-24 RX ADMIN — TIZANIDINE 4 MILLIGRAM(S): 4 TABLET ORAL at 03:04

## 2024-03-24 RX ADMIN — BUMETANIDE 1 MILLIGRAM(S): 0.25 INJECTION INTRAMUSCULAR; INTRAVENOUS at 17:01

## 2024-03-24 RX ADMIN — OXYCODONE HYDROCHLORIDE 10 MILLIGRAM(S): 5 TABLET ORAL at 12:23

## 2024-03-24 RX ADMIN — TIZANIDINE 4 MILLIGRAM(S): 4 TABLET ORAL at 08:44

## 2024-03-24 RX ADMIN — TIZANIDINE 4 MILLIGRAM(S): 4 TABLET ORAL at 16:49

## 2024-03-24 RX ADMIN — FAMOTIDINE 20 MILLIGRAM(S): 10 INJECTION INTRAVENOUS at 11:23

## 2024-03-24 RX ADMIN — CARVEDILOL PHOSPHATE 3.12 MILLIGRAM(S): 80 CAPSULE, EXTENDED RELEASE ORAL at 18:41

## 2024-03-24 RX ADMIN — OXYCODONE HYDROCHLORIDE 10 MILLIGRAM(S): 5 TABLET ORAL at 18:06

## 2024-03-24 RX ADMIN — POLYETHYLENE GLYCOL 3350 17 GRAM(S): 17 POWDER, FOR SOLUTION ORAL at 06:02

## 2024-03-24 RX ADMIN — NALOXEGOL OXALATE 25 MILLIGRAM(S): 12.5 TABLET, FILM COATED ORAL at 11:23

## 2024-03-24 RX ADMIN — Medication 6: at 12:49

## 2024-03-24 RX ADMIN — ATORVASTATIN CALCIUM 20 MILLIGRAM(S): 80 TABLET, FILM COATED ORAL at 21:48

## 2024-03-24 RX ADMIN — TIZANIDINE 4 MILLIGRAM(S): 4 TABLET ORAL at 21:47

## 2024-03-24 RX ADMIN — Medication 200 MILLIGRAM(S): at 13:21

## 2024-03-24 RX ADMIN — OXYCODONE HYDROCHLORIDE 10 MILLIGRAM(S): 5 TABLET ORAL at 19:52

## 2024-03-24 RX ADMIN — ENOXAPARIN SODIUM 40 MILLIGRAM(S): 100 INJECTION SUBCUTANEOUS at 18:06

## 2024-03-24 RX ADMIN — ENOXAPARIN SODIUM 40 MILLIGRAM(S): 100 INJECTION SUBCUTANEOUS at 06:02

## 2024-03-24 RX ADMIN — Medication 8 UNIT(S): at 12:49

## 2024-03-24 RX ADMIN — OXYCODONE HYDROCHLORIDE 10 MILLIGRAM(S): 5 TABLET ORAL at 06:01

## 2024-03-24 RX ADMIN — Medication 200 MILLIGRAM(S): at 21:48

## 2024-03-24 RX ADMIN — OXYCODONE HYDROCHLORIDE 10 MILLIGRAM(S): 5 TABLET ORAL at 20:30

## 2024-03-24 RX ADMIN — Medication 600 MILLIGRAM(S): at 18:07

## 2024-03-24 RX ADMIN — Medication 18 UNIT(S): at 08:43

## 2024-03-24 RX ADMIN — LACTULOSE 10 GRAM(S): 10 SOLUTION ORAL at 16:48

## 2024-03-24 RX ADMIN — Medication 3 MILLILITER(S): at 03:40

## 2024-03-24 RX ADMIN — OXYCODONE HYDROCHLORIDE 10 MILLIGRAM(S): 5 TABLET ORAL at 11:23

## 2024-03-24 RX ADMIN — INSULIN GLARGINE 52 UNIT(S): 100 INJECTION, SOLUTION SUBCUTANEOUS at 21:48

## 2024-03-24 RX ADMIN — Medication 100 MILLIGRAM(S): at 11:24

## 2024-03-24 RX ADMIN — Medication 15 UNIT(S): at 17:02

## 2024-03-24 RX ADMIN — Medication 6: at 08:42

## 2024-03-24 NOTE — PROGRESS NOTE ADULT - SUBJECTIVE AND OBJECTIVE BOX
[facility] Progress Note    MAR, chart notes, fingersticks and labs reviewed    Interval history: no events    Vital Signs  T(C): 36.4 (03-24-24 @ 04:40), Max: 37.3 (03-23-24 @ 20:55)  HR: 64 (03-24-24 @ 11:20) (56 - 68)  BP: 112/75 (03-24-24 @ 11:20) (102/56 - 126/68)  RR: 19 (03-24-24 @ 04:43) (18 - 20)  SpO2: 97% (03-24-24 @ 04:43) (89% - 97%)    Physical Exam  Gen: NAD, alert, awake  HEENT: NC/AT, EOMI  Neck: supple  Chest: breathing comfortably  Heart: +s1 +s2    Medications  albuterol/ipratropium for Nebulization 3 milliLiter(s) Nebulizer every 6 hours PRN  atorvastatin 20 milliGRAM(s) Oral at bedtime  benzonatate 100 milliGRAM(s) Oral three times a day PRN  bisacodyl Suppository 10 milliGRAM(s) Rectal daily PRN  carvedilol 3.125 milliGRAM(s) Oral every 12 hours  cefTRIAXone   IVPB 1000 milliGRAM(s) IV Intermittent every 24 hours  dextrose 5%. 1000 milliLiter(s) IV Continuous <Continuous>  dextrose 5%. 1000 milliLiter(s) IV Continuous <Continuous>  dextrose 50% Injectable 25 Gram(s) IV Push once  dextrose 50% Injectable 12.5 Gram(s) IV Push once  dextrose 50% Injectable 25 Gram(s) IV Push once  dextrose Oral Gel 15 Gram(s) Oral once PRN  enoxaparin Injectable 40 milliGRAM(s) SubCutaneous every 12 hours  famotidine    Tablet 20 milliGRAM(s) Oral daily  glucagon  Injectable 1 milliGRAM(s) IntraMuscular once  guaiFENesin  milliGRAM(s) Oral every 12 hours  insulin glargine Injectable (LANTUS) 52 Unit(s) SubCutaneous at bedtime  insulin lispro (ADMELOG) corrective regimen sliding scale   SubCutaneous three times a day before meals  insulin lispro (ADMELOG) corrective regimen sliding scale   SubCutaneous at bedtime  insulin lispro Injectable (ADMELOG) 8 Unit(s) SubCutaneous before lunch  insulin lispro Injectable (ADMELOG) 15 Unit(s) SubCutaneous before dinner  melatonin 5 milliGRAM(s) Oral at bedtime PRN  naloxegol 25 milliGRAM(s) Oral daily  oxyCODONE    IR 5 milliGRAM(s) Oral every 4 hours PRN  oxyCODONE    IR 10 milliGRAM(s) Oral every 4 hours PRN  oxyCODONE  ER Tablet 10 milliGRAM(s) Oral every 12 hours  polyethylene glycol 3350 17 Gram(s) Oral two times a day  pregabalin 200 milliGRAM(s) Oral three times a day  senna 2 Tablet(s) Oral at bedtime  tiZANidine 4 milliGRAM(s) Oral every 6 hours    Pertinent labs/Imaging  POCT Blood Glucose.: 255 mg/dL (03-24-24 @ 12:29)  POCT Blood Glucose.: 259 mg/dL (03-24-24 @ 08:19)  POCT Blood Glucose.: 200 mg/dL (03-23-24 @ 21:08)  POCT Blood Glucose.: 220 mg/dL (03-23-24 @ 17:47)    eGFR: 84 mL/min/1.73m2 (03-24-24 @ 10:09)  eGFR: 81 mL/min/1.73m2 (03-23-24 @ 10:56)

## 2024-03-24 NOTE — PROGRESS NOTE ADULT - ASSESSMENT
53y old Female with history of T2DM, CHF, Idiopathic intracranial hypertension, HTN, HLD here with acute on chronic back pain.     1. T2DM with hyperglycemia    - Continue Lantus 45 units at night  - Increase Admelog to 18 units with breakfast, 8 units with lunch and continue 15 units with dinner  - Continue moderate Admelog correctional scale before meals and bedtime  - Monitor FS before meals and bedtime  - Follow hospital hypoglycemic protocol    2. CHF  - on Bumex and Coreg  - Consider Jardiance/Farxiga outpatient    3. HLD  - Continue Atorvastatin 20 mg daily    Will continue to follow.    Melissa Murray MD  [facility]    [address] [place] [address]    T [phone]  F [phone]    53y old Female with history of T2DM, CHF, Idiopathic intracranial hypertension, HTN, HLD here with acute on chronic back pain.     1. T2DM with hyperglycemia  - remains high  - Increase Lantus to 52 units at night  - Increase Admelog to 22 units with breakfast and continue 8 units with lunch and 15 units with dinner  - Continue moderate Admelog correctional scale before meals and bedtime  - Monitor FS before meals and bedtime  - Follow hospital hypoglycemic protocol    2. CHF  - on Bumex and Coreg  - Consider Jardiance/Farxiga outpatient    3. HLD  - Continue Atorvastatin 20 mg daily    Will continue to follow.    Melissa Murray MD  [facility]    [address] [place] [address]    T [phone]  F [phone]

## 2024-03-24 NOTE — PROGRESS NOTE ADULT - PROBLEM SELECTOR PLAN 2
#PNA   Afebrile, no leukocytosis. Now w/ persistent cough and on 3L NC. RVP negative.   CXR 2/23 w/ c/f possible pneumopericardium. CT Chest ordered and was evaluated by thoracic surgery.   CT Chest: no pneumopericardium. Possible JAYLEN PNA   -tessalon pearle, Mucinex, duonebs prn   -F/u MRSA PCR, Sputum Cx, urine legionella   -C/w levaquin for now   -Wean O2 as tolerated

## 2024-03-25 DIAGNOSIS — R33.9 RETENTION OF URINE, UNSPECIFIED: ICD-10-CM

## 2024-03-25 DIAGNOSIS — J15.69 PNEUMONIA DUE TO OTHER GRAM-NEGATIVE BACTERIA: ICD-10-CM

## 2024-03-25 DIAGNOSIS — J96.01 ACUTE RESPIRATORY FAILURE WITH HYPOXIA: ICD-10-CM

## 2024-03-25 LAB
ADD ON TEST-SPECIMEN IN LAB: SIGNIFICANT CHANGE UP
ANION GAP SERPL CALC-SCNC: 18 MMOL/L — HIGH (ref 5–17)
BUN SERPL-MCNC: 17 MG/DL — SIGNIFICANT CHANGE UP (ref 7–23)
CALCIUM SERPL-MCNC: 8.7 MG/DL — SIGNIFICANT CHANGE UP (ref 8.4–10.5)
CHLORIDE SERPL-SCNC: 99 MMOL/L — SIGNIFICANT CHANGE UP (ref 96–108)
CO2 SERPL-SCNC: 18 MMOL/L — LOW (ref 22–31)
CREAT SERPL-MCNC: 0.93 MG/DL — SIGNIFICANT CHANGE UP (ref 0.5–1.3)
CULTURE RESULTS: ABNORMAL
EGFR: 73 ML/MIN/1.73M2 — SIGNIFICANT CHANGE UP
FLUAV AG NPH QL: SIGNIFICANT CHANGE UP
FLUBV AG NPH QL: SIGNIFICANT CHANGE UP
GLUCOSE BLDC GLUCOMTR-MCNC: 210 MG/DL — HIGH (ref 70–99)
GLUCOSE BLDC GLUCOMTR-MCNC: 246 MG/DL — HIGH (ref 70–99)
GLUCOSE BLDC GLUCOMTR-MCNC: 247 MG/DL — HIGH (ref 70–99)
GLUCOSE BLDC GLUCOMTR-MCNC: 291 MG/DL — HIGH (ref 70–99)
GLUCOSE BLDC GLUCOMTR-MCNC: 314 MG/DL — HIGH (ref 70–99)
GLUCOSE SERPL-MCNC: 266 MG/DL — HIGH (ref 70–99)
HCT VFR BLD CALC: 33.3 % — LOW (ref 34.5–45)
HGB BLD-MCNC: 10.8 G/DL — LOW (ref 11.5–15.5)
HPIV3 RNA SPEC QL NAA+PROBE: DETECTED
LEGIONELLA AG UR QL: NEGATIVE — SIGNIFICANT CHANGE UP
MCHC RBC-ENTMCNC: 29.9 PG — SIGNIFICANT CHANGE UP (ref 27–34)
MCHC RBC-ENTMCNC: 32.4 GM/DL — SIGNIFICANT CHANGE UP (ref 32–36)
MCV RBC AUTO: 92.2 FL — SIGNIFICANT CHANGE UP (ref 80–100)
MRSA PCR RESULT.: SIGNIFICANT CHANGE UP
NRBC # BLD: 0 /100 WBCS — SIGNIFICANT CHANGE UP (ref 0–0)
PLATELET # BLD AUTO: 395 K/UL — SIGNIFICANT CHANGE UP (ref 150–400)
POTASSIUM SERPL-MCNC: 4.6 MMOL/L — SIGNIFICANT CHANGE UP (ref 3.5–5.3)
POTASSIUM SERPL-SCNC: 4.6 MMOL/L — SIGNIFICANT CHANGE UP (ref 3.5–5.3)
PROCALCITONIN SERPL-MCNC: 0.08 NG/ML — SIGNIFICANT CHANGE UP (ref 0.02–0.1)
RAPID RVP RESULT: DETECTED
RBC # BLD: 3.61 M/UL — LOW (ref 3.8–5.2)
RBC # FLD: 12.8 % — SIGNIFICANT CHANGE UP (ref 10.3–14.5)
RSV RNA NPH QL NAA+NON-PROBE: SIGNIFICANT CHANGE UP
S AUREUS DNA NOSE QL NAA+PROBE: SIGNIFICANT CHANGE UP
SARS-COV-2 RNA SPEC QL NAA+PROBE: SIGNIFICANT CHANGE UP
SARS-COV-2 RNA SPEC QL NAA+PROBE: SIGNIFICANT CHANGE UP
SODIUM SERPL-SCNC: 135 MMOL/L — SIGNIFICANT CHANGE UP (ref 135–145)
SPECIMEN SOURCE: SIGNIFICANT CHANGE UP
WBC # BLD: 7.84 K/UL — SIGNIFICANT CHANGE UP (ref 3.8–10.5)
WBC # FLD AUTO: 7.84 K/UL — SIGNIFICANT CHANGE UP (ref 3.8–10.5)

## 2024-03-25 PROCEDURE — 99223 1ST HOSP IP/OBS HIGH 75: CPT

## 2024-03-25 PROCEDURE — 99222 1ST HOSP IP/OBS MODERATE 55: CPT

## 2024-03-25 PROCEDURE — 99233 SBSQ HOSP IP/OBS HIGH 50: CPT

## 2024-03-25 RX ORDER — INSULIN LISPRO 100/ML
25 VIAL (ML) SUBCUTANEOUS
Refills: 0 | Status: DISCONTINUED | OUTPATIENT
Start: 2024-03-26 | End: 2024-03-26

## 2024-03-25 RX ORDER — CEFTRIAXONE 500 MG/1
1000 INJECTION, POWDER, FOR SOLUTION INTRAMUSCULAR; INTRAVENOUS EVERY 24 HOURS
Refills: 0 | Status: DISCONTINUED | OUTPATIENT
Start: 2024-03-25 | End: 2024-03-25

## 2024-03-25 RX ORDER — INSULIN LISPRO 100/ML
6 VIAL (ML) SUBCUTANEOUS
Refills: 0 | Status: DISCONTINUED | OUTPATIENT
Start: 2024-03-25 | End: 2024-03-26

## 2024-03-25 RX ORDER — INSULIN GLARGINE 100 [IU]/ML
56 INJECTION, SOLUTION SUBCUTANEOUS AT BEDTIME
Refills: 0 | Status: DISCONTINUED | OUTPATIENT
Start: 2024-03-25 | End: 2024-03-26

## 2024-03-25 RX ORDER — IPRATROPIUM/ALBUTEROL SULFATE 18-103MCG
3 AEROSOL WITH ADAPTER (GRAM) INHALATION ONCE
Refills: 0 | Status: COMPLETED | OUTPATIENT
Start: 2024-03-25 | End: 2024-03-25

## 2024-03-25 RX ORDER — INSULIN LISPRO 100/ML
3 VIAL (ML) SUBCUTANEOUS ONCE
Refills: 0 | Status: COMPLETED | OUTPATIENT
Start: 2024-03-25 | End: 2024-03-25

## 2024-03-25 RX ORDER — INSULIN LISPRO 100/ML
10 VIAL (ML) SUBCUTANEOUS
Refills: 0 | Status: DISCONTINUED | OUTPATIENT
Start: 2024-03-26 | End: 2024-03-26

## 2024-03-25 RX ORDER — CEFTRIAXONE 500 MG/1
2000 INJECTION, POWDER, FOR SOLUTION INTRAMUSCULAR; INTRAVENOUS EVERY 24 HOURS
Refills: 0 | Status: DISCONTINUED | OUTPATIENT
Start: 2024-03-25 | End: 2024-03-26

## 2024-03-25 RX ORDER — INSULIN LISPRO 100/ML
19 VIAL (ML) SUBCUTANEOUS
Refills: 0 | Status: DISCONTINUED | OUTPATIENT
Start: 2024-03-25 | End: 2024-03-26

## 2024-03-25 RX ADMIN — Medication 200 MILLIGRAM(S): at 13:13

## 2024-03-25 RX ADMIN — OXYCODONE HYDROCHLORIDE 10 MILLIGRAM(S): 5 TABLET ORAL at 08:58

## 2024-03-25 RX ADMIN — Medication 200 MILLIGRAM(S): at 22:35

## 2024-03-25 RX ADMIN — Medication 4: at 17:07

## 2024-03-25 RX ADMIN — Medication 6 UNIT(S): at 22:40

## 2024-03-25 RX ADMIN — Medication 100 MILLIGRAM(S): at 11:29

## 2024-03-25 RX ADMIN — Medication 600 MILLIGRAM(S): at 05:00

## 2024-03-25 RX ADMIN — Medication 8 UNIT(S): at 13:12

## 2024-03-25 RX ADMIN — BUMETANIDE 1 MILLIGRAM(S): 0.25 INJECTION INTRAMUSCULAR; INTRAVENOUS at 05:00

## 2024-03-25 RX ADMIN — Medication 22 UNIT(S): at 08:57

## 2024-03-25 RX ADMIN — SENNA PLUS 2 TABLET(S): 8.6 TABLET ORAL at 22:35

## 2024-03-25 RX ADMIN — Medication 200 MILLIGRAM(S): at 05:01

## 2024-03-25 RX ADMIN — CEFTRIAXONE 100 MILLIGRAM(S): 500 INJECTION, POWDER, FOR SOLUTION INTRAMUSCULAR; INTRAVENOUS at 17:09

## 2024-03-25 RX ADMIN — NALOXEGOL OXALATE 25 MILLIGRAM(S): 12.5 TABLET, FILM COATED ORAL at 11:29

## 2024-03-25 RX ADMIN — Medication 100 MILLIGRAM(S): at 17:05

## 2024-03-25 RX ADMIN — CARVEDILOL PHOSPHATE 3.12 MILLIGRAM(S): 80 CAPSULE, EXTENDED RELEASE ORAL at 05:00

## 2024-03-25 RX ADMIN — TIZANIDINE 4 MILLIGRAM(S): 4 TABLET ORAL at 11:28

## 2024-03-25 RX ADMIN — OXYCODONE HYDROCHLORIDE 10 MILLIGRAM(S): 5 TABLET ORAL at 09:45

## 2024-03-25 RX ADMIN — ATORVASTATIN CALCIUM 20 MILLIGRAM(S): 80 TABLET, FILM COATED ORAL at 22:35

## 2024-03-25 RX ADMIN — ENOXAPARIN SODIUM 40 MILLIGRAM(S): 100 INJECTION SUBCUTANEOUS at 17:07

## 2024-03-25 RX ADMIN — TIZANIDINE 4 MILLIGRAM(S): 4 TABLET ORAL at 17:06

## 2024-03-25 RX ADMIN — OXYCODONE HYDROCHLORIDE 10 MILLIGRAM(S): 5 TABLET ORAL at 17:05

## 2024-03-25 RX ADMIN — ENOXAPARIN SODIUM 40 MILLIGRAM(S): 100 INJECTION SUBCUTANEOUS at 05:01

## 2024-03-25 RX ADMIN — Medication 6: at 08:57

## 2024-03-25 RX ADMIN — Medication 5 MILLIGRAM(S): at 22:34

## 2024-03-25 RX ADMIN — TIZANIDINE 4 MILLIGRAM(S): 4 TABLET ORAL at 05:00

## 2024-03-25 RX ADMIN — INSULIN GLARGINE 56 UNIT(S): 100 INJECTION, SOLUTION SUBCUTANEOUS at 22:39

## 2024-03-25 RX ADMIN — POLYETHYLENE GLYCOL 3350 17 GRAM(S): 17 POWDER, FOR SOLUTION ORAL at 05:01

## 2024-03-25 RX ADMIN — Medication 600 MILLIGRAM(S): at 17:05

## 2024-03-25 RX ADMIN — Medication 4: at 13:13

## 2024-03-25 RX ADMIN — Medication 3 MILLILITER(S): at 05:37

## 2024-03-25 RX ADMIN — OXYCODONE HYDROCHLORIDE 10 MILLIGRAM(S): 5 TABLET ORAL at 05:01

## 2024-03-25 RX ADMIN — POLYETHYLENE GLYCOL 3350 17 GRAM(S): 17 POWDER, FOR SOLUTION ORAL at 17:07

## 2024-03-25 RX ADMIN — Medication 3 UNIT(S): at 04:40

## 2024-03-25 RX ADMIN — OXYCODONE HYDROCHLORIDE 10 MILLIGRAM(S): 5 TABLET ORAL at 22:36

## 2024-03-25 RX ADMIN — FAMOTIDINE 20 MILLIGRAM(S): 10 INJECTION INTRAVENOUS at 11:28

## 2024-03-25 RX ADMIN — Medication 19 UNIT(S): at 17:07

## 2024-03-25 RX ADMIN — CARVEDILOL PHOSPHATE 3.12 MILLIGRAM(S): 80 CAPSULE, EXTENDED RELEASE ORAL at 17:06

## 2024-03-25 NOTE — PROGRESS NOTE ADULT - SUBJECTIVE AND OBJECTIVE BOX
[facility] Progress Note    MAR, chart notes, fingersticks and labs reviewed    Subjective: her dinner was messed up so she ended up order marlyn, oanh corado last night at around 9 pm leading to the hyperglycemia at midnight    Objective  Vital Signs  T(C): 36.7 (03-25-24 @ 16:32), Max: 36.7 (03-24-24 @ 19:58)  HR: 56 (03-25-24 @ 16:32) (51 - 70)  BP: 118/68 (03-25-24 @ 16:32) (112/66 - 130/88)  RR: 18 (03-25-24 @ 16:32) (18 - 18)  SpO2: 97% (03-25-24 @ 16:32) (95% - 98%)    Physical Exam  Gen: NAD, alert, awake  HEENT: NC/AT, EOMI  Neck: supple  Chest: breathing comfortably on nasal cannula  Heart: +s1 +s2    Medications  albuterol/ipratropium for Nebulization 3 milliLiter(s) Nebulizer every 6 hours PRN  atorvastatin 20 milliGRAM(s) Oral at bedtime  benzonatate 100 milliGRAM(s) Oral three times a day PRN  bisacodyl Suppository 10 milliGRAM(s) Rectal daily PRN  buMETAnide 1 milliGRAM(s) Oral daily  carvedilol 3.125 milliGRAM(s) Oral every 12 hours  cefTRIAXone   IVPB 2000 milliGRAM(s) IV Intermittent every 24 hours  dextrose 5%. 1000 milliLiter(s) IV Continuous <Continuous>  dextrose 5%. 1000 milliLiter(s) IV Continuous <Continuous>  dextrose 50% Injectable 25 Gram(s) IV Push once  dextrose 50% Injectable 12.5 Gram(s) IV Push once  dextrose 50% Injectable 25 Gram(s) IV Push once  dextrose Oral Gel 15 Gram(s) Oral once PRN  enoxaparin Injectable 40 milliGRAM(s) SubCutaneous every 12 hours  famotidine    Tablet 20 milliGRAM(s) Oral daily  glucagon  Injectable 1 milliGRAM(s) IntraMuscular once  guaiFENesin  milliGRAM(s) Oral every 12 hours  insulin glargine Injectable (LANTUS) 56 Unit(s) SubCutaneous at bedtime  insulin lispro (ADMELOG) corrective regimen sliding scale   SubCutaneous three times a day before meals  insulin lispro (ADMELOG) corrective regimen sliding scale   SubCutaneous at bedtime  insulin lispro Injectable (ADMELOG) 19 Unit(s) SubCutaneous before dinner  insulin lispro Injectable (ADMELOG) 6 Unit(s) SubCutaneous <User Schedule>  melatonin 5 milliGRAM(s) Oral at bedtime PRN  naloxegol 25 milliGRAM(s) Oral daily  oxyCODONE    IR 5 milliGRAM(s) Oral every 4 hours PRN  oxyCODONE    IR 10 milliGRAM(s) Oral every 4 hours PRN  oxyCODONE  ER Tablet 10 milliGRAM(s) Oral every 12 hours  polyethylene glycol 3350 17 Gram(s) Oral two times a day  pregabalin 200 milliGRAM(s) Oral three times a day  senna 2 Tablet(s) Oral at bedtime  tiZANidine 4 milliGRAM(s) Oral every 6 hours    Pertinent labs/Imaging  POCT Blood Glucose.: 247 mg/dL (03-25-24 @ 16:57)  POCT Blood Glucose.: 246 mg/dL (03-25-24 @ 12:50)  POCT Blood Glucose.: 291 mg/dL (03-25-24 @ 08:51)  POCT Blood Glucose.: 314 mg/dL (03-25-24 @ 04:05)  POCT Blood Glucose.: 326 mg/dL (03-24-24 @ 23:55)  POCT Blood Glucose.: 231 mg/dL (03-24-24 @ 21:33)    eGFR: 73 mL/min/1.73m2 (03-25-24 @ 06:30)  eGFR: 84 mL/min/1.73m2 (03-24-24 @ 10:09)

## 2024-03-25 NOTE — CONSULT NOTE ADULT - ASSESSMENT
53 F PMH CHF, HTN, HLD, DM (A1c 7.3), idiopathic intracranial hypertension (diagnosed 2020), chronic right sided back pain (intercostal neuralgia/complex regional pain syndrome) s/p spinal stimulator, who p/w poorly controlled back pain.    Acute on chronic pain, urinary retention s/p Cadena  Cough, hypoxic, on NC O2  Afebrile, no leukocytosis  Legionella Ur Ag negative, sputum cx negative, MRSA PCR negative  CT with probable JAYLEN pneumonia    Suggest;  Stop Levaquin  Resume CTX 1g IV QD  Check serum procalcitonin   Check b/l LE Dopplers  Other considerations for hypoxia as per Primary Team 53 F PMH CHF, HTN, HLD, DM (A1c 7.3), idiopathic intracranial hypertension (diagnosed 2020), chronic right sided back pain (intercostal neuralgia/complex regional pain syndrome) s/p spinal stimulator, who p/w poorly controlled back pain.    Acute on chronic pain, urinary retention s/p Cadena  Cough, hypoxic, on NC O2  Afebrile, no leukocytosis  Legionella Ur Ag negative, sputum cx negative, MRSA PCR negative  CT with probable JAYLEN pneumonia    Suggest;  Stop Levaquin  Resume CTX -> increase dose to 2g IV QD  Check serum procalcitonin   Check full RVP  Check b/l LE Dopplers  Other considerations for hypoxia as per Primary Team

## 2024-03-25 NOTE — CONSULT NOTE ADULT - ASSESSMENT
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 Found to have productive cough with yellow sputum production, mild hypoxemia, and CT chest with JAYLEN ground-glass opacity worrisome for pneumonia. Also with nasal congestion.    ASSESSMENT:  Left upper lobe community acquired pneumonia  Bibasilar atelectasis  Mild intermittent asthma  Morbid obesity  History of ARDS  HFpEF  History of post-capillary pulmonary hypertension    PLAN:  - Suspect acute viral lower respiratory tract infection given associated nasal congestion vs. mild CAP  - Infectious workup so far with normal respiratory ermias in sputum culture, negative urine legionella, and negative nasal MRSA/MSSA  - Please check mini-RVP and full RVP  - Would also consider checking blood cultures  - Follow-up procalcitonin  - Agree with completing course of ceftriaxone for CAP  - Hypoxemia appears resolved given SpO2 currently 95% on room air  - Supplemental oxygen with NC@1-2 LPM as necessary for goal SpO2>90%  - Continue albuterol-ipratropium nebs (consider changing to q6h ATC)  - Airway clearance therapy with acapella and incentive spirometry  - Continue guaifenesin  mg twice daily  - Benzonatate PRN, would increase to 200 mg q8h PRN  - Continue diuresis for lower extremity edema and h/o HFpEF and Group II pulmonary hypertension (RHC in Oct 2022 with mPAP 39, PA presssure 58/29, PCWP 23, CI 3.45, PVR 1.75 BEDOLLA, which is consistent with post-capillary pulmonary hypertension)  - Will need repeat CT chest in 6-8 weeks to assess for resolution of pneumonia, especially given smoking history  - Close follow-up with her pulmonologist Dr. Davin Salas as outpatient  - Discussed with team

## 2024-03-25 NOTE — PROGRESS NOTE ADULT - ASSESSMENT
54 y/o F with PMH of CHF, HTN, HLD, DM, Idiopathic intracranial hypertension (diagnosed 2020), chronic right sided back pain (intercostal neuralgia/complex regional pain syndrome) s/p spinal stimulator p/w poorly controlled back pain. Now found to have JAYLEN pna

## 2024-03-25 NOTE — CONSULT NOTE ADULT - SUBJECTIVE AND OBJECTIVE BOX
Patient is a 53 Female who presents with a chief complaint of body pain    HPI:  53 F PMH CHF, HTN, HLD, DM (A1c 7.3), idiopathic intracranial hypertension (diagnosed 2020), chronic right sided back pain (intercostal neuralgia/complex regional pain syndrome) s/p spinal stimulator, who p/w poorly controlled back pain.    Here with acute on chronic pain, urinary retention s/p Cadena. ID consulted as patient with cough on admission. Hypoxic, on NC O2. Afebrile, no leukocytosis, Legionella Ur Ag negative, sputum cx negative, MRSA PCR negative. CT with probable JAYLEN pneumonia.     REVIEW OF SYSTEMS  Constitutional: No fevers  Skin: No rash  Eyes: No discharge	  ENMT: No sore throat  Respiratory: + Cough, + SOB  Cardiovascular: No chest pain  Gastrointestinal: No pain, nausea, vomiting  Genitourinary: No dysuria  MSK: Pain controlled with current regimen  Neurological: No AMS     prior hospital charts reviewed [V]  primary team notes reviewed [V]  other consultant notes reviewed [V]    PAST MEDICAL & SURGICAL HISTORY:  Diabetes    HTN (hypertension)    Neuropathy    Obesity    Former cigarette smoker  (smoked x 45 years; quit ~2013)    Marijuana smoker, continuous  (states smokes 3 - 4 times per day for pain management reasons)    Neuralgia  (pt states hx/o "intercostal neuralgia")    Cardiac abnormality  (pt states hx/o "cardiac event"; is unclear on diagnosis; denies hx/o MI)    Diabetes    Essential hypertension    IBS (irritable bowel syndrome)    Complex regional pain syndrome type 1    History of cholecystectomy    History of hand surgery  (pt states she had 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12)    Spinal cord neurostimulator device in situ    SOCIAL HISTORY:  Retired  Lives alone  No sick contacts     FAMILY HISTORY:  FH: HTN (hypertension)    Allergies  amitriptyline (Other)  duloxetine (Other)    ANTIMICROBIALS:  levoFLOXacin IVPB 750 every 24 hours    ANTIMICROBIALS (past 90 days):  MEDICATIONS  (STANDING):  cefTRIAXone   IVPB   100 mL/Hr IV Intermittent (03-23-24 @ 21:53)    levoFLOXacin IVPB   100 mL/Hr IV Intermittent (03-24-24 @ 16:47)    OTHER MEDS:   MEDICATIONS  (STANDING):  albuterol/ipratropium for Nebulization 3 every 6 hours PRN  atorvastatin 20 at bedtime  benzonatate 100 three times a day PRN  bisacodyl Suppository 10 daily PRN  buMETAnide 1 daily  carvedilol 3.125 every 12 hours  dextrose 50% Injectable 12.5 once  dextrose 50% Injectable 25 once  dextrose 50% Injectable 25 once  dextrose Oral Gel 15 once PRN  enoxaparin Injectable 40 every 12 hours  famotidine    Tablet 20 daily  glucagon  Injectable 1 once  guaiFENesin  every 12 hours  insulin glargine Injectable (LANTUS) 52 at bedtime  insulin lispro (ADMELOG) corrective regimen sliding scale  three times a day before meals  insulin lispro (ADMELOG) corrective regimen sliding scale  at bedtime  insulin lispro Injectable (ADMELOG) 15 before dinner  insulin lispro Injectable (ADMELOG) 8 before lunch  insulin lispro Injectable (ADMELOG) 22 before breakfast  melatonin 5 at bedtime PRN  naloxegol 25 daily  oxyCODONE    IR 5 every 4 hours PRN  oxyCODONE    IR 10 every 4 hours PRN  oxyCODONE  ER Tablet 10 every 12 hours  polyethylene glycol 3350 17 two times a day  pregabalin 200 three times a day  senna 2 at bedtime  tiZANidine 4 every 6 hours    VITALS:  Vital Signs Last 24 Hrs  T(F): 97.4 (03-25-24 @ 12:01), Max: 99.4 (03-23-24 @ 07:53)    Vital Signs Last 24 Hrs  HR: 62 (03-25-24 @ 12:01) (51 - 70)  BP: 130/88 (03-25-24 @ 12:01) (112/66 - 130/88)  RR: 18 (03-25-24 @ 12:01)  SpO2: 95% (03-25-24 @ 12:01) (95% - 98%)  Wt(kg): --    EXAM:  General: Patient in no acute distress   HEENT: NCAT, EOMI  CV: S1+S2  Lungs: No respiratory distress, CTAB  On NC O2  Abd: Soft  Ext: No cyanosis  Neuro: Alert and oriented, no focal deficits  Skin: No rash     Labs:                        10.8   7.84  )-----------( 395      ( 25 Mar 2024 06:30 )             33.3     03-25    135  |  99  |  17  ----------------------------<  266<H>  4.6   |  18<L>  |  0.93    Ca    8.7      25 Mar 2024 06:30    WBC Trend:  WBC Count: 7.84 (03-25-24 @ 06:30)  WBC Count: 7.74 (03-24-24 @ 10:09)  WBC Count: 8.58 (03-23-24 @ 10:56)  WBC Count: 6.75 (03-22-24 @ 09:21)    Auto Neutrophil #: 3.67 K/uL (03-22-24 @ 09:21)  Auto Neutrophil #: 3.91 K/uL (03-21-24 @ 07:35)  Auto Neutrophil #: 7.25 K/uL (03-19-24 @ 20:22)  Auto Neutrophil #: 6.37 K/uL (12-28-23 @ 06:51)  Auto Neutrophil #: 5.91 K/uL (12-27-23 @ 06:20)    Creatine Trend:  Creatinine: 0.93 (03-25)  Creatinine: 0.83 (03-24)  Creatinine: 0.86 (03-23)  Creatinine: 0.78 (03-22)    Liver Biochemical Testing Trend:  Alanine Aminotransferase (ALT/SGPT): 20 (03-21)  Alanine Aminotransferase (ALT/SGPT): 21 (03-20)  Alanine Aminotransferase (ALT/SGPT): 18 (03-19)  Alanine Aminotransferase (ALT/SGPT): 23 (02-17)  Alanine Aminotransferase (ALT/SGPT): 19 (12-29)  Aspartate Aminotransferase (AST/SGOT): 14 (03-21-24 @ 07:32)  Aspartate Aminotransferase (AST/SGOT): 27 (03-20-24 @ 11:47)  Aspartate Aminotransferase (AST/SGOT): 19 (03-19-24 @ 20:22)  Aspartate Aminotransferase (AST/SGOT): 13 (02-17-24 @ 21:30)  Aspartate Aminotransferase (AST/SGOT): 16 (12-29-23 @ 06:50)  Bilirubin Total: 0.6 (03-21)  Bilirubin Total: 0.7 (03-20)  Bilirubin Total: 0.4 (03-19)  Bilirubin Total: 0.5 (02-17)  Bilirubin Total: 0.2 (12-29)    Urinalysis Basic - ( 25 Mar 2024 06:30 )    Color: x / Appearance: x / SG: x / pH: x  Gluc: 266 mg/dL / Ketone: x  / Bili: x / Urobili: x   Blood: x / Protein: x / Nitrite: x   Leuk Esterase: x / RBC: x / WBC x   Sq Epi: x / Non Sq Epi: x / Bacteria: x    MICROBIOLOGY:    MRSA PCR Result.: NotDetec (03-24-24 @ 16:45)    Culture - Sputum (collected 23 Mar 2024 16:39)  Source: .Sputum Sputum  Preliminary Report:    Normal Respiratory Brittany present    Culture - CSF with Gram Stain (collected 11 Dec 2023 11:07)  Source: .CSF CSF  Final Report:    No growth    Culture - Acid Fast - CSF (collected 11 Dec 2023 11:07)  Source: .CSF CSF  Final Report:    No acid-fast bacilli isolated after 6 weeks.    Culture - Urine (collected 22 Jan 2023 17:51)  Source: Clean Catch Clean Catch (Midstream)  Final Report:    <10,000 CFU/mL Normal Urogenital Brittany    Culture - Blood (collected 23 Sep 2022 12:45)  Source: .Blood Blood  Final Report:    No Growth Final    Culture - Sputum (collected 14 Jul 2022 12:05)  Source: ET Tube ET Tube cup  Final Report:    Normal Respiratory Brittany present    Culture - Blood (collected 14 Jul 2022 11:45)  Source: .Blood Blood-Peripheral  Final Report:    No Growth Final    Culture - Blood (collected 14 Jul 2022 11:10)  Source: .Blood Blood-Peripheral  Final Report:    No Growth Final    Culture - Acid Fast - Bronchial w/Smear (collected 12 Jul 2022 09:15)  Source: .Bronchial Bronchial Lavage  Final Report:    No acid fast bacilli isolated after 6 weeks.    Culture - Fungal, Bronchial (collected 12 Jul 2022 09:15)  Source: .Bronchial Bronchial Lavage  Final Report:    No fungus isolated after 4 weeks.    Legionella Antigen, Urine: Negative (03-24 @ 16:45)    Troponin T, High Sensitivity Result: 8 (03-19)    A1C with Estimated Average Glucose Result: 7.3 % (02-19-24 @ 05:45)  A1C with Estimated Average Glucose Result: 7.5 % (12-09-23 @ 05:12)    RADIOLOGY:  imaging below personally reviewed    < from: CT Chest No Cont (03.23.24 @ 16:48) >  IMPRESSION:  Probable left upper lobe pneumonia.  No pneumopericardium    < end of copied text >

## 2024-03-25 NOTE — PROGRESS NOTE ADULT - ASSESSMENT
53y old Female with history of T2DM, CHF, Idiopathic intracranial hypertension, HTN, HLD here with acute on chronic back pain.     1. T2DM with hyperglycemia  - remains high as she's eating at night, will add Admelog with bedtime snack/meal  - Increase Lantus to 56 units at night  - Increase Admelog to 25 units with breakfast, 10 units with lunch and 19 units with dinner  - Add Admelog 6 units with 9 pm snack/meal  - Continue moderate Admelog correctional scale before meals and bedtime  - Monitor FS before meals and bedtime  - Follow hospital hypoglycemic protocol    2. CHF  - on Bumex and Coreg  - Consider Jardiance/Farxiga outpatient    3. HLD  - Continue Atorvastatin 20 mg daily    Will continue to follow.    Melissa Murray MD  [facility]- Division of Endocrinology    [address] [place] [address]    T [phone]  F [phone]

## 2024-03-25 NOTE — CONSULT NOTE ADULT - SUBJECTIVE AND OBJECTIVE BOX
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 Today reports recent cough productive of yellow phlegm. No fevers or chills. Has associated nasal congestion. No fevers or chills. Urine legionella negative and sputum culture with normal respiratory ermias. MRSA nasal PCR negative. Pending mini-RVP.    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12)    Spinal cord neurostimulator device in situ      FAMILY HISTORY:  FH: HTN (hypertension)        SOCIAL HISTORY:  Quit smoking in 2014, used to smoke up to 1 ppd for 35 years  No alcohol use  Former marijuana smoking, quit in 2018  No illicit drug use    Allergies  amitriptyline (Other)  duloxetine (Other)    HOME MEDICATIONS: Fioricet, Vit D, acetazolamide, oxycodone, Ozempic, lisinopril, atorvastatin, carvedilol, melatonin, bumetanide, spironolactone, pantoprazole, naloxegol, Tresiba, metformin, Novolog, pregabalin, albuterol PRN    REVIEW OF SYSTEMS:  Constitutional: [ ] fevers [ ] chills [ ] weight loss [ ] weight gain  HEENT: [ ] dry eyes [ ] eye irritation [x ] postnasal drip [ x] nasal congestion  CV: [ ] chest pain [ ] orthopnea [ ] palpitations [ ] murmur  Resp: [x ] cough [x ] shortness of breath [ ] wheezing [x ] sputum [ ] hemoptysis  GI: [ ] nausea [ ] vomiting [ ] diarrhea [ ] constipation [ ] abd pain [ ] dysphagia   : [ ] dysuria [ ] nocturia [ ] hematuria [ ] increased urinary frequency  Musculoskeletal: [ x] myalgias [ ] arthralgias   Skin: [ ] rash [ ] itch  Neurological: [ ] headache [ ] dizziness [ ] syncope [ ] weakness [ ] numbness  Psychiatric: [ ] anxiety [ ] depression  Endocrine: [ ] diabetes [ ] thyroid problem  Hematologic/Lymphatic: [ ] anemia [ ] bleeding problem  Allergic/Immunologic: [ ] itchy eyes [ ] nasal discharge [ ] hives [ ] angioedema  [x ] All other systems negative    OBJECTIVE:  ICU Vital Signs Last 24 Hrs  T(C): 36.3 (25 Mar 2024 12:01), Max: 36.7 (24 Mar 2024 19:58)  T(F): 97.4 (25 Mar 2024 12:01), Max: 98.1 (24 Mar 2024 19:58)  HR: 62 (25 Mar 2024 12:01) (51 - 70)  BP: 130/88 (25 Mar 2024 12:01) (112/66 - 130/88)  RR: 18 (25 Mar 2024 12:01) (18 - 18)  SpO2: 95% (25 Mar 2024 12:01) (95% - 98%)    O2 Parameters below as of 25 Mar 2024 12:01  Patient On (Oxygen Delivery Method): nasal cannula  O2 Flow (L/min): 3      03-24 @ 07:01  -  03-25 @ 07:00  --------------------------------------------------------  IN: 1700 mL / OUT: 5450 mL / NET: -3750 mL    CAPILLARY BLOOD GLUCOSE    POCT Blood Glucose.: 246 mg/dL (25 Mar 2024 12:50)      PHYSICAL EXAM:  General:  NAD; AAOx3; obese  Neuro: CN II-XII grossly intact; moving all extremities   HEENT: NC/AT; EOMI, MMM  CV: normal S1 & S2; regular rate and rhythm   Pulm: faint inspiratory rales at the bases that improve with coughing  GI: soft; NT/ND  Ext: 1-2+ lower extremity edema bilaterally  Skin: warm and well perfused     HOSPITAL MEDICATIONS:  MEDICATIONS  (STANDING):  atorvastatin 20 milliGRAM(s) Oral at bedtime  buMETAnide 1 milliGRAM(s) Oral daily  carvedilol 3.125 milliGRAM(s) Oral every 12 hours  cefTRIAXone   IVPB 2000 milliGRAM(s) IV Intermittent every 24 hours  enoxaparin Injectable 40 milliGRAM(s) SubCutaneous every 12 hours  famotidine    Tablet 20 milliGRAM(s) Oral daily  guaiFENesin  milliGRAM(s) Oral every 12 hours  insulin glargine Injectable (LANTUS) 52 Unit(s) SubCutaneous at bedtime  insulin lispro (ADMELOG) corrective regimen sliding scale   SubCutaneous three times a day before meals  insulin lispro (ADMELOG) corrective regimen sliding scale   SubCutaneous at bedtime  insulin lispro Injectable (ADMELOG) 15 Unit(s) SubCutaneous before dinner  insulin lispro Injectable (ADMELOG) 8 Unit(s) SubCutaneous before lunch  insulin lispro Injectable (ADMELOG) 22 Unit(s) SubCutaneous before breakfast  naloxegol 25 milliGRAM(s) Oral daily  oxyCODONE  ER Tablet 10 milliGRAM(s) Oral every 12 hours  polyethylene glycol 3350 17 Gram(s) Oral two times a day  pregabalin 200 milliGRAM(s) Oral three times a day  senna 2 Tablet(s) Oral at bedtime  tiZANidine 4 milliGRAM(s) Oral every 6 hours    MEDICATIONS  (PRN):  albuterol/ipratropium for Nebulization 3 milliLiter(s) Nebulizer every 6 hours PRN Shortness of Breath and/or Wheezing  benzonatate 100 milliGRAM(s) Oral three times a day PRN Cough  bisacodyl Suppository 10 milliGRAM(s) Rectal daily PRN Constipation  dextrose Oral Gel 15 Gram(s) Oral once PRN Blood Glucose LESS THAN 70 milliGRAM(s)/deciliter  melatonin 5 milliGRAM(s) Oral at bedtime PRN Sleep  oxyCODONE    IR 5 milliGRAM(s) Oral every 4 hours PRN Moderate Pain (4 - 6)  oxyCODONE    IR 10 milliGRAM(s) Oral every 4 hours PRN Severe Pain (7 - 10)      LABS:                        10.8   7.84  )-----------( 395      ( 25 Mar 2024 06:30 )             33.3     Hgb Trend: 10.8<--, 11.2<--, 10.6<--, 10.8<--, 10.9<--  03-25    135  |  99  |  17  ----------------------------<  266<H>  4.6   |  18<L>  |  0.93    Ca    8.7      25 Mar 2024 06:30      Creatinine Trend: 0.93<--, 0.83<--, 0.86<--, 0.78<--, 0.92<--, 0.75<--    Urinalysis Basic - ( 25 Mar 2024 06:30 )    Color: x / Appearance: x / SG: x / pH: x  Gluc: 266 mg/dL / Ketone: x  / Bili: x / Urobili: x   Blood: x / Protein: x / Nitrite: x   Leuk Esterase: x / RBC: x / WBC x   Sq Epi: x / Non Sq Epi: x / Bacteria: x    Sputum culture: normal respiratory ermias    Urine Legionella negative    Nasal MRSA/MSSA negative    CT chest (3/23/24): patent central airways, groundglass opacity in the JAYLEN likely infectious, patchy bilateral lower lobe subsegmental atelectasis. No pleural effusion. No lymphadenopathy.

## 2024-03-25 NOTE — CONSULT NOTE ADULT - ATTENDING COMMENTS
This is a 52 y/o F w/ PMHx HFpEF, severe pHTN, DM2 on insulin, HTN, GERD, complex regional pain syndrope w/ spinal implant 2022 admitted to [facility] on 3/19 for severe back pain.  In the ER, pt was afebrile, on RA, WBC 10.7, severe hypomagnesemia. Admitted fo rpain control.   On 3/23, noted to have productive cough, started on 3L NC, CXR w/ pneumopericardium, CT chest w/ JAYLEN GGO, no findings of pneumopericardium.     #Hypoxic respiratory failure   #Productive cough  #JAYLEN GGO  #PNA?    Overall, 52 y/o F w/ PMHx HFpEF, severe pHTN, DM2 on insulin, HTN, GERD, complex regional pain syndrope w/ spinal inplant 2022 admitted to [facility] on 3/19 for severe back pain, c/b hypoxic respiratory failure w/ CT Chest findings of JAYLEN GGOs w/ concerns for PNA started on Levofloxacin.   Pt w/ productive cough, however is afebrile and w/o leukocytosis. Urine legionella is negative, CT w/ JAYLEN GGO, is non specific.   Given symptoms, hypoxia, and CT findings would finish a short course of Ceftriaxone. However unlikely JAYLEN opacity on its own explains the patient's hypoxia.     Plan:   Ceftriaxone 2 g q24 (start tomorrow) for total 5 day course EOT: 3/28/24. Can finish with Cefpodoxime 200 mg BID if otherwise ready for discharge    Check full RVP    Thank you for this consult. Inpatient ID consult team will sign off.    Further changes in lab values, imaging studies, or clinical status will not be known to ID inpatient consultants unless specifically communicated by primary team.    Haroldo Tomlinson MD  Attending Physician  Division of Infectious Diseases  Department of Medicine    Please contact through MS Teams message.  Office: [phone] (after 5 PM or weekend)

## 2024-03-25 NOTE — PROGRESS NOTE ADULT - PROBLEM SELECTOR PLAN 2
-Afebrile, no leukocytosis. Now w/ persistent cough and on 3L NC.   -RVP negative.   -CXR 2/23 w/ c/f possible pneumopericardium  -CT Chest: no pneumopericardium. Possible JAYLEN PNA   -C/w IV ceftriaxone   -tessalon kira, Mucinex, duonebs prn   -F/u MRSA PCR, Sputum Cx  -Wean O2 as tolerated  -ID eval

## 2024-03-25 NOTE — PROGRESS NOTE ADULT - SUBJECTIVE AND OBJECTIVE BOX
Patient is a 53y old  Female who presents with a chief complaint of Diffuse body pain, chest pain since yesterday (25 Mar 2024 13:02)      SUBJECTIVE / OVERNIGHT EVENTS: pt reports some dyspnea, wheezing, cough, denies cp, abdominal pain, back pain is better     MEDICATIONS  (STANDING):  atorvastatin 20 milliGRAM(s) Oral at bedtime  buMETAnide 1 milliGRAM(s) Oral daily  carvedilol 3.125 milliGRAM(s) Oral every 12 hours  cefTRIAXone   IVPB 1000 milliGRAM(s) IV Intermittent every 24 hours  dextrose 5%. 1000 milliLiter(s) (50 mL/Hr) IV Continuous <Continuous>  dextrose 5%. 1000 milliLiter(s) (100 mL/Hr) IV Continuous <Continuous>  dextrose 50% Injectable 12.5 Gram(s) IV Push once  dextrose 50% Injectable 25 Gram(s) IV Push once  dextrose 50% Injectable 25 Gram(s) IV Push once  enoxaparin Injectable 40 milliGRAM(s) SubCutaneous every 12 hours  famotidine    Tablet 20 milliGRAM(s) Oral daily  glucagon  Injectable 1 milliGRAM(s) IntraMuscular once  guaiFENesin  milliGRAM(s) Oral every 12 hours  insulin glargine Injectable (LANTUS) 52 Unit(s) SubCutaneous at bedtime  insulin lispro (ADMELOG) corrective regimen sliding scale   SubCutaneous three times a day before meals  insulin lispro (ADMELOG) corrective regimen sliding scale   SubCutaneous at bedtime  insulin lispro Injectable (ADMELOG) 15 Unit(s) SubCutaneous before dinner  insulin lispro Injectable (ADMELOG) 8 Unit(s) SubCutaneous before lunch  insulin lispro Injectable (ADMELOG) 22 Unit(s) SubCutaneous before breakfast  naloxegol 25 milliGRAM(s) Oral daily  oxyCODONE  ER Tablet 10 milliGRAM(s) Oral every 12 hours  polyethylene glycol 3350 17 Gram(s) Oral two times a day  pregabalin 200 milliGRAM(s) Oral three times a day  senna 2 Tablet(s) Oral at bedtime  tiZANidine 4 milliGRAM(s) Oral every 6 hours    MEDICATIONS  (PRN):  albuterol/ipratropium for Nebulization 3 milliLiter(s) Nebulizer every 6 hours PRN Shortness of Breath and/or Wheezing  benzonatate 100 milliGRAM(s) Oral three times a day PRN Cough  bisacodyl Suppository 10 milliGRAM(s) Rectal daily PRN Constipation  dextrose Oral Gel 15 Gram(s) Oral once PRN Blood Glucose LESS THAN 70 milliGRAM(s)/deciliter  melatonin 5 milliGRAM(s) Oral at bedtime PRN Sleep  oxyCODONE    IR 5 milliGRAM(s) Oral every 4 hours PRN Moderate Pain (4 - 6)  oxyCODONE    IR 10 milliGRAM(s) Oral every 4 hours PRN Severe Pain (7 - 10)        CAPILLARY BLOOD GLUCOSE      POCT Blood Glucose.: 246 mg/dL (25 Mar 2024 12:50)  POCT Blood Glucose.: 291 mg/dL (25 Mar 2024 08:51)  POCT Blood Glucose.: 314 mg/dL (25 Mar 2024 04:05)  POCT Blood Glucose.: 326 mg/dL (24 Mar 2024 23:55)  POCT Blood Glucose.: 231 mg/dL (24 Mar 2024 21:33)  POCT Blood Glucose.: 164 mg/dL (24 Mar 2024 16:46)    I&O's Summary    24 Mar 2024 07:01  -  25 Mar 2024 07:00  --------------------------------------------------------  IN: 1700 mL / OUT: 5450 mL / NET: -3750 mL    25 Mar 2024 07:01  -  25 Mar 2024 14:14  --------------------------------------------------------  IN: 1220 mL / OUT: 1500 mL / NET: -280 mL        PHYSICAL EXAM:  EYES: EOMI; conjunctiva and sclera clear  ENMT: Moist oral mucosa, no pharyngeal injection or exudates  NECK: Supple, trachea midline   RESPIRATORY: Normal respiratory effort; lungs are clear to auscultation bilaterally  CARDIOVASCULAR: Regular rate and rhythm, normal S1 and S2  ABDOMEN: Nontender to palpation, normoactive bowel sounds, no rebound/guarding  MUSCULOSKELETAL: moves all extremities   PSYCH: A+O to person, place, and time  NEUROLOGY: grossly nonfocal     LABS:                        10.8   7.84  )-----------( 395      ( 25 Mar 2024 06:30 )             33.3     03-25    135  |  99  |  17  ----------------------------<  266<H>  4.6   |  18<L>  |  0.93    Ca    8.7      25 Mar 2024 06:30            Urinalysis Basic - ( 25 Mar 2024 06:30 )    Color: x / Appearance: x / SG: x / pH: x  Gluc: 266 mg/dL / Ketone: x  / Bili: x / Urobili: x   Blood: x / Protein: x / Nitrite: x   Leuk Esterase: x / RBC: x / WBC x   Sq Epi: x / Non Sq Epi: x / Bacteria: x        RADIOLOGY & ADDITIONAL TESTS:    Imaging Personally Reviewed:    Consultant(s) Notes Reviewed:      Care Discussed with Consultants/Other Providers:

## 2024-03-25 NOTE — CONSULT NOTE ADULT - REASON FOR ADMISSION
Diffuse body pain, chest pain since yesterday

## 2024-03-26 ENCOUNTER — TRANSCRIPTION ENCOUNTER (OUTPATIENT)
Age: 54
End: 2024-03-26

## 2024-03-26 ENCOUNTER — APPOINTMENT (OUTPATIENT)
Dept: PHYSICAL MEDICINE AND REHAB | Facility: CLINIC | Age: 54
End: 2024-03-26

## 2024-03-26 VITALS
DIASTOLIC BLOOD PRESSURE: 65 MMHG | SYSTOLIC BLOOD PRESSURE: 131 MMHG | HEART RATE: 55 BPM | OXYGEN SATURATION: 97 % | RESPIRATION RATE: 18 BRPM | TEMPERATURE: 98 F

## 2024-03-26 DIAGNOSIS — B34.8 OTHER VIRAL INFECTIONS OF UNSPECIFIED SITE: ICD-10-CM

## 2024-03-26 LAB
ANION GAP SERPL CALC-SCNC: 15 MMOL/L — SIGNIFICANT CHANGE UP (ref 5–17)
BUN SERPL-MCNC: 19 MG/DL — SIGNIFICANT CHANGE UP (ref 7–23)
CALCIUM SERPL-MCNC: 9 MG/DL — SIGNIFICANT CHANGE UP (ref 8.4–10.5)
CHLORIDE SERPL-SCNC: 99 MMOL/L — SIGNIFICANT CHANGE UP (ref 96–108)
CO2 SERPL-SCNC: 19 MMOL/L — LOW (ref 22–31)
CREAT SERPL-MCNC: 0.85 MG/DL — SIGNIFICANT CHANGE UP (ref 0.5–1.3)
EGFR: 82 ML/MIN/1.73M2 — SIGNIFICANT CHANGE UP
GLUCOSE BLDC GLUCOMTR-MCNC: 198 MG/DL — HIGH (ref 70–99)
GLUCOSE BLDC GLUCOMTR-MCNC: 203 MG/DL — HIGH (ref 70–99)
GLUCOSE BLDC GLUCOMTR-MCNC: 281 MG/DL — HIGH (ref 70–99)
GLUCOSE SERPL-MCNC: 318 MG/DL — HIGH (ref 70–99)
HCT VFR BLD CALC: 34 % — LOW (ref 34.5–45)
HGB BLD-MCNC: 10.9 G/DL — LOW (ref 11.5–15.5)
MCHC RBC-ENTMCNC: 29.5 PG — SIGNIFICANT CHANGE UP (ref 27–34)
MCHC RBC-ENTMCNC: 32.1 GM/DL — SIGNIFICANT CHANGE UP (ref 32–36)
MCV RBC AUTO: 91.9 FL — SIGNIFICANT CHANGE UP (ref 80–100)
NRBC # BLD: 0 /100 WBCS — SIGNIFICANT CHANGE UP (ref 0–0)
PLATELET # BLD AUTO: 442 K/UL — HIGH (ref 150–400)
POTASSIUM SERPL-MCNC: 5.1 MMOL/L — SIGNIFICANT CHANGE UP (ref 3.5–5.3)
POTASSIUM SERPL-SCNC: 5.1 MMOL/L — SIGNIFICANT CHANGE UP (ref 3.5–5.3)
PROCALCITONIN SERPL-MCNC: 0.06 NG/ML — SIGNIFICANT CHANGE UP (ref 0.02–0.1)
RBC # BLD: 3.7 M/UL — LOW (ref 3.8–5.2)
RBC # FLD: 12.9 % — SIGNIFICANT CHANGE UP (ref 10.3–14.5)
SODIUM SERPL-SCNC: 133 MMOL/L — LOW (ref 135–145)
WBC # BLD: 10.98 K/UL — HIGH (ref 3.8–10.5)
WBC # FLD AUTO: 10.98 K/UL — HIGH (ref 3.8–10.5)

## 2024-03-26 PROCEDURE — 82310 ASSAY OF CALCIUM: CPT

## 2024-03-26 PROCEDURE — 0225U NFCT DS DNA&RNA 21 SARSCOV2: CPT

## 2024-03-26 PROCEDURE — 83970 ASSAY OF PARATHORMONE: CPT

## 2024-03-26 PROCEDURE — 96374 THER/PROPH/DIAG INJ IV PUSH: CPT

## 2024-03-26 PROCEDURE — 87637 SARSCOV2&INF A&B&RSV AMP PRB: CPT

## 2024-03-26 PROCEDURE — 71250 CT THORAX DX C-: CPT | Mod: MC

## 2024-03-26 PROCEDURE — 83880 ASSAY OF NATRIURETIC PEPTIDE: CPT

## 2024-03-26 PROCEDURE — 76770 US EXAM ABDO BACK WALL COMP: CPT

## 2024-03-26 PROCEDURE — 83735 ASSAY OF MAGNESIUM: CPT

## 2024-03-26 PROCEDURE — 96375 TX/PRO/DX INJ NEW DRUG ADDON: CPT

## 2024-03-26 PROCEDURE — 99285 EMERGENCY DEPT VISIT HI MDM: CPT | Mod: 25

## 2024-03-26 PROCEDURE — 84145 PROCALCITONIN (PCT): CPT

## 2024-03-26 PROCEDURE — 84443 ASSAY THYROID STIM HORMONE: CPT

## 2024-03-26 PROCEDURE — 84484 ASSAY OF TROPONIN QUANT: CPT

## 2024-03-26 PROCEDURE — 94640 AIRWAY INHALATION TREATMENT: CPT

## 2024-03-26 PROCEDURE — 99232 SBSQ HOSP IP/OBS MODERATE 35: CPT

## 2024-03-26 PROCEDURE — 87449 NOS EACH ORGANISM AG IA: CPT

## 2024-03-26 PROCEDURE — 82962 GLUCOSE BLOOD TEST: CPT

## 2024-03-26 PROCEDURE — 80048 BASIC METABOLIC PNL TOTAL CA: CPT

## 2024-03-26 PROCEDURE — 71045 X-RAY EXAM CHEST 1 VIEW: CPT

## 2024-03-26 PROCEDURE — 99233 SBSQ HOSP IP/OBS HIGH 50: CPT

## 2024-03-26 PROCEDURE — 93970 EXTREMITY STUDY: CPT | Mod: 26

## 2024-03-26 PROCEDURE — 93306 TTE W/DOPPLER COMPLETE: CPT

## 2024-03-26 PROCEDURE — 85027 COMPLETE CBC AUTOMATED: CPT

## 2024-03-26 PROCEDURE — 81001 URINALYSIS AUTO W/SCOPE: CPT

## 2024-03-26 PROCEDURE — 84702 CHORIONIC GONADOTROPIN TEST: CPT

## 2024-03-26 PROCEDURE — 97116 GAIT TRAINING THERAPY: CPT

## 2024-03-26 PROCEDURE — 87640 STAPH A DNA AMP PROBE: CPT

## 2024-03-26 PROCEDURE — 36415 COLL VENOUS BLD VENIPUNCTURE: CPT

## 2024-03-26 PROCEDURE — 97161 PT EVAL LOW COMPLEX 20 MIN: CPT

## 2024-03-26 PROCEDURE — 97110 THERAPEUTIC EXERCISES: CPT

## 2024-03-26 PROCEDURE — 80053 COMPREHEN METABOLIC PANEL: CPT

## 2024-03-26 PROCEDURE — 85025 COMPLETE CBC W/AUTO DIFF WBC: CPT

## 2024-03-26 PROCEDURE — 93970 EXTREMITY STUDY: CPT

## 2024-03-26 PROCEDURE — 87070 CULTURE OTHR SPECIMN AEROBIC: CPT

## 2024-03-26 PROCEDURE — 87641 MR-STAPH DNA AMP PROBE: CPT

## 2024-03-26 PROCEDURE — 84100 ASSAY OF PHOSPHORUS: CPT

## 2024-03-26 RX ORDER — TIZANIDINE 4 MG/1
1 TABLET ORAL
Qty: 0 | Refills: 0 | DISCHARGE
Start: 2024-03-26

## 2024-03-26 RX ORDER — INSULIN DEGLUDEC 100 U/ML
45 INJECTION, SOLUTION SUBCUTANEOUS
Qty: 1 | Refills: 0
Start: 2024-03-26 | End: 2024-04-24

## 2024-03-26 RX ORDER — TIZANIDINE 4 MG/1
1 TABLET ORAL
Qty: 28 | Refills: 0
Start: 2024-03-26 | End: 2024-04-01

## 2024-03-26 RX ORDER — ATORVASTATIN CALCIUM 80 MG/1
1 TABLET, FILM COATED ORAL
Qty: 0 | Refills: 0 | DISCHARGE
Start: 2024-03-26

## 2024-03-26 RX ORDER — OXYCODONE HYDROCHLORIDE 5 MG/1
1 TABLET ORAL
Qty: 30 | Refills: 0
Start: 2024-03-26 | End: 2024-03-30

## 2024-03-26 RX ORDER — OXYCODONE HYDROCHLORIDE 5 MG/1
1 TABLET ORAL
Refills: 0
Start: 2024-03-26

## 2024-03-26 RX ORDER — OXYCODONE HYDROCHLORIDE 5 MG/1
1 TABLET ORAL
Qty: 0 | Refills: 0 | DISCHARGE
Start: 2024-03-26

## 2024-03-26 RX ORDER — INSULIN DEGLUDEC 100 U/ML
45 INJECTION, SOLUTION SUBCUTANEOUS
Refills: 0 | DISCHARGE

## 2024-03-26 RX ORDER — LANOLIN ALCOHOL/MO/W.PET/CERES
1 CREAM (GRAM) TOPICAL
Qty: 0 | Refills: 0 | DISCHARGE
Start: 2024-03-26

## 2024-03-26 RX ORDER — NALOXONE HYDROCHLORIDE 4 MG/.1ML
1 SPRAY NASAL
Qty: 2 | Refills: 0
Start: 2024-03-26 | End: 2024-03-30

## 2024-03-26 RX ORDER — BUMETANIDE 0.25 MG/ML
1 INJECTION INTRAMUSCULAR; INTRAVENOUS
Qty: 0 | Refills: 0 | DISCHARGE
Start: 2024-03-26

## 2024-03-26 RX ORDER — FLUTICASONE PROPIONATE 50 MCG
1 SPRAY, SUSPENSION NASAL
Refills: 0 | Status: DISCONTINUED | OUTPATIENT
Start: 2024-03-26 | End: 2024-03-26

## 2024-03-26 RX ORDER — OXYCODONE HYDROCHLORIDE 5 MG/1
1 TABLET ORAL
Qty: 10 | Refills: 0
Start: 2024-03-26 | End: 2024-03-30

## 2024-03-26 RX ORDER — NALOXEGOL OXALATE 12.5 MG/1
1 TABLET, FILM COATED ORAL
Qty: 0 | Refills: 0 | DISCHARGE
Start: 2024-03-26

## 2024-03-26 RX ORDER — CARVEDILOL PHOSPHATE 80 MG/1
1 CAPSULE, EXTENDED RELEASE ORAL
Qty: 0 | Refills: 0 | DISCHARGE
Start: 2024-03-26

## 2024-03-26 RX ORDER — FLUTICASONE PROPIONATE 50 MCG
1 SPRAY, SUSPENSION NASAL
Qty: 0 | Refills: 0 | DISCHARGE
Start: 2024-03-26

## 2024-03-26 RX ORDER — INSULIN DEGLUDEC 100 U/ML
54 INJECTION, SOLUTION SUBCUTANEOUS
Qty: 1 | Refills: 0 | DISCHARGE
Start: 2024-03-26 | End: 2024-04-24

## 2024-03-26 RX ADMIN — Medication 200 MILLIGRAM(S): at 13:54

## 2024-03-26 RX ADMIN — Medication 600 MILLIGRAM(S): at 17:28

## 2024-03-26 RX ADMIN — Medication 1 SPRAY(S): at 17:29

## 2024-03-26 RX ADMIN — POLYETHYLENE GLYCOL 3350 17 GRAM(S): 17 POWDER, FOR SOLUTION ORAL at 08:43

## 2024-03-26 RX ADMIN — CARVEDILOL PHOSPHATE 3.12 MILLIGRAM(S): 80 CAPSULE, EXTENDED RELEASE ORAL at 17:29

## 2024-03-26 RX ADMIN — NALOXEGOL OXALATE 25 MILLIGRAM(S): 12.5 TABLET, FILM COATED ORAL at 12:36

## 2024-03-26 RX ADMIN — Medication 600 MILLIGRAM(S): at 06:08

## 2024-03-26 RX ADMIN — Medication 10 UNIT(S): at 13:36

## 2024-03-26 RX ADMIN — FAMOTIDINE 20 MILLIGRAM(S): 10 INJECTION INTRAVENOUS at 12:36

## 2024-03-26 RX ADMIN — OXYCODONE HYDROCHLORIDE 10 MILLIGRAM(S): 5 TABLET ORAL at 10:50

## 2024-03-26 RX ADMIN — BUMETANIDE 1 MILLIGRAM(S): 0.25 INJECTION INTRAMUSCULAR; INTRAVENOUS at 06:08

## 2024-03-26 RX ADMIN — ENOXAPARIN SODIUM 40 MILLIGRAM(S): 100 INJECTION SUBCUTANEOUS at 17:29

## 2024-03-26 RX ADMIN — Medication 2: at 13:37

## 2024-03-26 RX ADMIN — TIZANIDINE 4 MILLIGRAM(S): 4 TABLET ORAL at 12:36

## 2024-03-26 RX ADMIN — Medication 6: at 08:44

## 2024-03-26 RX ADMIN — Medication 25 UNIT(S): at 08:44

## 2024-03-26 RX ADMIN — Medication 200 MILLIGRAM(S): at 06:07

## 2024-03-26 RX ADMIN — TIZANIDINE 4 MILLIGRAM(S): 4 TABLET ORAL at 17:29

## 2024-03-26 RX ADMIN — TIZANIDINE 4 MILLIGRAM(S): 4 TABLET ORAL at 06:06

## 2024-03-26 RX ADMIN — Medication 19 UNIT(S): at 17:26

## 2024-03-26 RX ADMIN — ENOXAPARIN SODIUM 40 MILLIGRAM(S): 100 INJECTION SUBCUTANEOUS at 06:07

## 2024-03-26 RX ADMIN — OXYCODONE HYDROCHLORIDE 10 MILLIGRAM(S): 5 TABLET ORAL at 17:29

## 2024-03-26 RX ADMIN — OXYCODONE HYDROCHLORIDE 10 MILLIGRAM(S): 5 TABLET ORAL at 06:07

## 2024-03-26 RX ADMIN — POLYETHYLENE GLYCOL 3350 17 GRAM(S): 17 POWDER, FOR SOLUTION ORAL at 17:30

## 2024-03-26 RX ADMIN — TIZANIDINE 4 MILLIGRAM(S): 4 TABLET ORAL at 00:29

## 2024-03-26 RX ADMIN — Medication 4: at 17:26

## 2024-03-26 NOTE — PROGRESS NOTE ADULT - PROBLEM SELECTOR PLAN 6
-HypoMg, HypoCa, HypoK  -Replete prn  -Monitor BMP
DVT ppx: Lovenox  PT -outpt PT      #Urinary rentention   UA and Renal US grossly unremarkable   Failed TOV; lara replaced; urology f/u outpatient
DVT ppx: Lovenox  PT -outpt PT
-UA and Renal US grossly unremarkable   -Possible due to constipation; bowel regimen as above  -Passed TOV

## 2024-03-26 NOTE — PROGRESS NOTE ADULT - PROBLEM SELECTOR PLAN 2
-Afebrile, no leukocytosis. Now w/ persistent cough and on 3L NC.   -RVP negative.   -CXR 2/23 w/ c/f possible pneumopericardium  -CT Chest: no pneumopericardium. Possible JAYLEN PNA   -S/p IV ceftriaxone   -tessalon pearle, Mucinexjolene prn   -Wean O2 as tolerated  -Follow up CT chest in 6-8 weeks  -ID eval

## 2024-03-26 NOTE — DISCHARGE NOTE PROVIDER - DETAILS OF MALNUTRITION DIAGNOSIS/DIAGNOSES
This patient has been assessed with a concern for Malnutrition and was treated during this hospitalization for the following Nutrition diagnosis/diagnoses:     -  03/22/2024: Morbid obesity (BMI > 40)

## 2024-03-26 NOTE — PROGRESS NOTE ADULT - SUBJECTIVE AND OBJECTIVE BOX
Patient is a 53y old  Female who presents with a chief complaint of Diffuse body pain, chest pain since yesterday (26 Mar 2024 13:54)      SUBJECTIVE / OVERNIGHT EVENTS: pt feels anxious wants to go home, still with some cough, denies cp, sob    MEDICATIONS  (STANDING):  atorvastatin 20 milliGRAM(s) Oral at bedtime  buMETAnide 1 milliGRAM(s) Oral daily  carvedilol 3.125 milliGRAM(s) Oral every 12 hours  dextrose 5%. 1000 milliLiter(s) (100 mL/Hr) IV Continuous <Continuous>  dextrose 5%. 1000 milliLiter(s) (50 mL/Hr) IV Continuous <Continuous>  dextrose 50% Injectable 25 Gram(s) IV Push once  dextrose 50% Injectable 12.5 Gram(s) IV Push once  dextrose 50% Injectable 25 Gram(s) IV Push once  enoxaparin Injectable 40 milliGRAM(s) SubCutaneous every 12 hours  famotidine    Tablet 20 milliGRAM(s) Oral daily  fluticasone propionate 50 MICROgram(s)/spray Nasal Spray 1 Spray(s) Both Nostrils two times a day  glucagon  Injectable 1 milliGRAM(s) IntraMuscular once  guaiFENesin  milliGRAM(s) Oral every 12 hours  insulin glargine Injectable (LANTUS) 56 Unit(s) SubCutaneous at bedtime  insulin lispro (ADMELOG) corrective regimen sliding scale   SubCutaneous three times a day before meals  insulin lispro (ADMELOG) corrective regimen sliding scale   SubCutaneous at bedtime  insulin lispro Injectable (ADMELOG) 25 Unit(s) SubCutaneous before breakfast  insulin lispro Injectable (ADMELOG) 6 Unit(s) SubCutaneous <User Schedule>  insulin lispro Injectable (ADMELOG) 10 Unit(s) SubCutaneous before lunch  insulin lispro Injectable (ADMELOG) 19 Unit(s) SubCutaneous before dinner  naloxegol 25 milliGRAM(s) Oral daily  oxyCODONE  ER Tablet 10 milliGRAM(s) Oral every 12 hours  polyethylene glycol 3350 17 Gram(s) Oral two times a day  pregabalin 200 milliGRAM(s) Oral three times a day  senna 2 Tablet(s) Oral at bedtime  tiZANidine 4 milliGRAM(s) Oral every 6 hours    MEDICATIONS  (PRN):  albuterol/ipratropium for Nebulization 3 milliLiter(s) Nebulizer every 6 hours PRN Shortness of Breath and/or Wheezing  benzonatate 200 milliGRAM(s) Oral three times a day PRN Cough  bisacodyl Suppository 10 milliGRAM(s) Rectal daily PRN Constipation  dextrose Oral Gel 15 Gram(s) Oral once PRN Blood Glucose LESS THAN 70 milliGRAM(s)/deciliter  melatonin 5 milliGRAM(s) Oral at bedtime PRN Sleep  oxyCODONE    IR 5 milliGRAM(s) Oral every 4 hours PRN Moderate Pain (4 - 6)  oxyCODONE    IR 10 milliGRAM(s) Oral every 4 hours PRN Severe Pain (7 - 10)        CAPILLARY BLOOD GLUCOSE      POCT Blood Glucose.: 198 mg/dL (26 Mar 2024 13:17)  POCT Blood Glucose.: 281 mg/dL (26 Mar 2024 08:42)  POCT Blood Glucose.: 210 mg/dL (25 Mar 2024 21:20)  POCT Blood Glucose.: 247 mg/dL (25 Mar 2024 16:57)    I&O's Summary    25 Mar 2024 07:01  -  26 Mar 2024 07:00  --------------------------------------------------------  IN: 1700 mL / OUT: 2400 mL / NET: -700 mL        PHYSICAL EXAM:  GENERAL: NAD, well-developed, obese   HEAD:  Atraumatic, Normocephalic  EYES:conjunctiva and sclera clear  NECK:  No JVD  CHEST/LUNG: Clear to auscultation bilaterally; No wheeze  HEART: Regular rate and rhythm; S1S2  ABDOMEN: Soft, Nontender, Nondistended; Bowel sounds present  EXTREMITIES:  2+ Peripheral Pulses, No clubbing, cyanosis, or edema  PSYCH: AAOx3  NEUROLOGY: non-focal    LABS:                        10.9   10.98 )-----------( 442      ( 26 Mar 2024 06:52 )             34.0     03-26    133<L>  |  99  |  19  ----------------------------<  318<H>  5.1   |  19<L>  |  0.85    Ca    9.0      26 Mar 2024 06:52            Urinalysis Basic - ( 26 Mar 2024 06:52 )    Color: x / Appearance: x / SG: x / pH: x  Gluc: 318 mg/dL / Ketone: x  / Bili: x / Urobili: x   Blood: x / Protein: x / Nitrite: x   Leuk Esterase: x / RBC: x / WBC x   Sq Epi: x / Non Sq Epi: x / Bacteria: x        RADIOLOGY & ADDITIONAL TESTS:    Imaging Personally Reviewed:    Consultant(s) Notes Reviewed:      Care Discussed with Consultants/Other Providers:

## 2024-03-26 NOTE — DISCHARGE NOTE NURSING/CASE MANAGEMENT/SOCIAL WORK - PATIENT PORTAL LINK FT
You can access the FollowMyHealth Patient Portal offered by [facility] by registering at the following website: [email or web address]. By joining [employer]’s FollowMyHealth portal, you will also be able to view your health information using other applications (apps) compatible with our system.

## 2024-03-26 NOTE — PROGRESS NOTE ADULT - ASSESSMENT
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 Found to have productive cough with yellow sputum production, mild hypoxemia, and CT chest with JAYLEN ground-glass opacity worrisome for pneumonia. Also with nasal congestion. Found to have Parainfluenza 3 on RVP.    ASSESSMENT:  Parainfluenza 3 lower respiratory tract viral infection  Bibasilar atelectasis  Mild intermittent asthma  Morbid obesity  History of ARDS  HFpEF  History of post-capillary pulmonary hypertension    PLAN:  - Given viral LRTI, would consider discontinuing ceftriaxone  - Hypoxemia resolved, current SpO2 is 96-97% on room air at rest and with walking  - Supplemental oxygen with NC@1-2 LPM as necessary for goal SpO2>90%  - Continue albuterol-ipratropium nebs (consider changing to q6h ATC)  - Airway clearance therapy with acapella and incentive spirometry  - Start fluticasone nasal spray 1 spray per nostril twice daily  - Continue guaifenesin  mg twice daily  - Benzonatate 200 mg q8h PRN  - Continue diuresis for lower extremity edema and h/o HFpEF and Group II pulmonary hypertension (RHC in Oct 2022 with mPAP 39, PA presssure 58/29, PCWP 23, CI 3.45, PVR 1.75 BEDOLLA, which is consistent with post-capillary pulmonary hypertension)  - Will need repeat CT chest in 6-8 weeks to assess for resolution of pneumonia, especially given smoking history  - Close follow-up with her pulmonologist Dr. Davin Salas as outpatient (discussed with Dr. Salas today)

## 2024-03-26 NOTE — DISCHARGE NOTE NURSING/CASE MANAGEMENT/SOCIAL WORK - NSDCPEFALRISK_GEN_ALL_CORE
For information on Fall & Injury Prevention, visit: [email or web address] OR  [email or web address] OR  https://www.cdc.gov/steadi/patient.html

## 2024-03-26 NOTE — DISCHARGE NOTE PROVIDER - NSDCCPCAREPLAN_GEN_ALL_CORE_FT
PRINCIPAL DISCHARGE DIAGNOSIS  Diagnosis: Parainfluenza  Assessment and Plan of Treatment: Follow up with Pulmonary      SECONDARY DISCHARGE DIAGNOSES  Diagnosis: Chronic pain syndrome  Assessment and Plan of Treatment: Follow up with pain management     PRINCIPAL DISCHARGE DIAGNOSIS  Diagnosis: Parainfluenza  Assessment and Plan of Treatment: Follow up with Pulmonary outpatient Dr Davin Salas. Follow up with repeat CT chest in 8 weeks to confirm clearance of left upper lobe consolidation. Continue home supplemental oxygen as needed for sleep and during awake time based on your pulse Ox levels or shortness of breath. Contact your oxygen supplier company for additional portable tanks as needed.      SECONDARY DISCHARGE DIAGNOSES  Diagnosis: Chronic pain syndrome  Assessment and Plan of Treatment: Follow up with pain management    Diagnosis: Urinary retention  Assessment and Plan of Treatment: Follow up with the urologist within 1-2 weeks for urinary catheter reevaluation.

## 2024-03-26 NOTE — PROGRESS NOTE ADULT - PROBLEM SELECTOR PLAN 7
-HypoMg, HypoCa, HypoK  -Replete prn  -Monitor BMP
-Carb consistent diet   -A1C 7.3  -Insulin recs per endo

## 2024-03-26 NOTE — PROGRESS NOTE ADULT - NUTRITIONAL ASSESSMENT
This patient has been assessed with a concern for Malnutrition and has been determined to have a diagnosis/diagnoses of Morbid obesity (BMI > 40).    This patient is being managed with:   Diet DASH/TLC-  Sodium & Cholesterol Restricted  Consistent Carbohydrate {Evening Snack} (CSTCHOSN)  Entered: Mar 20 2024  9:42AM  

## 2024-03-26 NOTE — DISCHARGE NOTE NURSING/CASE MANAGEMENT/SOCIAL WORK - NSDCVIVACCINE_GEN_ALL_CORE_FT
influenza, injectable, quadrivalent, preservative free; 04-Oct-2022 15:17; Elissa Cano (RN); [employer]; [id number] (Exp. Date: 30-Jun-2023); IntraMuscular; Deltoid Left.; 0.5 milliLiter(s); VIS (VIS Published: 06-Aug-2021, VIS Presented: 04-Oct-2022);

## 2024-03-26 NOTE — DISCHARGE NOTE PROVIDER - NSDCFUSCHEDAPPT_GEN_ALL_CORE_FT
Scot Kan  [facility] Physician Pending sale to [facility]  [facility] [address]  Scheduled Appointment: 04/11/2024

## 2024-03-26 NOTE — PROGRESS NOTE ADULT - PROBLEM SELECTOR PLAN 5
DVT ppx: Lovenox  PT -outpt PT
DVT ppx: Lovenox  PT -outpt PT      #Urinary rentention   UA and Renal US grossly unremarkable   Plan for TOV
DVT ppx: Lovenox  PT -outpt PT
-TTE: EF 65%, normal diastolic function, no significant changes compared to prior echo.   -bumex reduced dose 1mg daily, coreg 3.125mg BID
-Carb consistent diet   -A1C 7.3  -Insulin recs per endo
-Carb consistent diet   -A1C 7.3  -Insulin recs per endo
-UA and Renal US grossly unremarkable   -Possible due to constipation; bowel regimen as above  -Passed TOV

## 2024-03-26 NOTE — PROGRESS NOTE ADULT - ASSESSMENT
This is a 54 y/o F w/ PMHx HFpEF, severe pHTN, DM2 on insulin, HTN, GERD, complex regional pain syndrope w/ spinal implant 2022 admitted to [facility] on 3/19 for severe back pain.  In the ER, pt was afebrile, on RA, WBC 10.7, severe hypomagnesemia. Admitted fo rpain control.   On 3/23, noted to have productive cough, started on 3L NC, CXR w/ pneumopericardium, CT chest w/ JAYLEN GGO, no findings of pneumopericardium.     #Hypoxic respiratory failure   #Productive cough  #JAYLEN GGO  #PNA?    Overall, 54 y/o F w/ PMHx HFpEF, severe pHTN, DM2 on insulin, HTN, GERD, complex regional pain syndrope w/ spinal inplant 2022 admitted to [facility] on 3/19 for severe back pain, c/b hypoxic respiratory failure w/ CT Chest findings of JAYLEN GGOs w/ concerns for PNA started on Levofloxacin.   Pt w/ productive cough, however is afebrile and w/o leukocytosis. Urine legionella is negative, CT w/ JAYLEN GGO, is non specific.   RVP now positive for parainfluenza, procal negative.     Plan:   1. Would stop all antibiotics as symptoms are 2/2 parainfluenza     Thank you for this consult. Inpatient ID consult team will sign off.    Further changes in lab values, imaging studies, or clinical status will not be known to ID inpatient consultants unless specifically communicated by primary team.    Haroldo Tomlinson MD  Attending Physician  Division of Infectious Diseases  Department of Medicine    Please contact through MS Teams message.  Office: [phone] (after 5 PM or weekend).

## 2024-03-26 NOTE — PROGRESS NOTE ADULT - PROBLEM SELECTOR PLAN 1
-Chronic pain team following   -Oxycontin 10mg BID, Oxycodone 5 mg every 4 hrs PRN, lyrica 200mg TID, tizanidine 4mg q6h  -Bowel regimen while on opiates
-Chronic pain team consulted; f/u recs  -Oxycontin 10mg BID, lyrica 200mg TID, tizanidine 4mg q6h  -Bowel regimen while on opiates
-Chronic pain team following   -Oxycontin 10mg BID, Oxycodone 5 mg every 4 hrs PRN, lyrica 200mg TID, tizanidine 4mg q6h  -Bowel regimen while on opiates
-Chronic pain team following   -Oxycontin 10mg BID, Oxycodone 5 mg every 4 hrs PRN, lyrica 200mg TID, tizanidine 4mg q6h  -Bowel regimen while on opiates - Incr bowel regimen - naloxegol, senna, miralax, lactulose, dulcolax suppository    #Urinary rentention   UA and Renal US grossly unremarkable   Possible due to constipation; bowel regimen as above  Failed TOV; lara replaced; urology f/u outpatient
-Chronic pain team following   -Oxycontin 10mg BID, Oxycodone 5 mg every 4 hrs PRN, lyrica 200mg TID, tizanidine 4mg q6h  -Bowel regimen while on opiates - naloxegol, senna, miralax, lactulose, dulcolax suppository
-Chronic pain team following   -Oxycontin 10mg BID, Oxycodone 5 mg every 4 hrs PRN, lyrica 200mg TID, tizanidine 4mg q6h  -Bowel regimen while on opiates
-Chronic pain team following   -Oxycontin 10mg BID, Oxycodone 5 mg every 4 hrs PRN, lyrica 200mg TID, tizanidine 4mg q6h  -Bowel regimen while on opiates - naloxegol, senna, miralax, lactulose, dulcolax suppository

## 2024-03-26 NOTE — PROGRESS NOTE ADULT - PROBLEM SELECTOR PROBLEM 4
Hypomagnesemia
Hypomagnesemia
Type 2 diabetes mellitus
Chronic diastolic congestive heart failure
Type 2 diabetes mellitus
Type 2 diabetes mellitus
Parainfluenza

## 2024-03-26 NOTE — PROGRESS NOTE ADULT - PROBLEM SELECTOR PROBLEM 2
Chronic diastolic congestive heart failure
Cough
Chronic diastolic congestive heart failure
Gram-negative pneumonia
Cough
Chronic diastolic congestive heart failure
Gram-negative pneumonia

## 2024-03-26 NOTE — PROGRESS NOTE ADULT - PROBLEM SELECTOR PROBLEM 3
Chronic diastolic congestive heart failure
Hypomagnesemia
Hypomagnesemia
Chronic diastolic congestive heart failure
Hypomagnesemia
Acute respiratory failure with hypoxia
Acute respiratory failure with hypoxia

## 2024-03-26 NOTE — DISCHARGE NOTE PROVIDER - CARE PROVIDER_API CALL
Davin Salas  Pulmonary Disease  [address] [place] [address]  Phone: [phone]  Fax: [phone]  Follow Up Time:

## 2024-03-26 NOTE — PROGRESS NOTE ADULT - ASSESSMENT
53y old Female with history of T2DM, CHF, Idiopathic intracranial hypertension, HTN, HLD here with acute on chronic back pain.     1. T2DM with hyperglycemia  - Continue Lantus 56 units at night  - Continue Admelog 25 units with breakfast, 10 units with lunch and 19 units with dinner  - Continue Admelog 6 units with 9 pm snack/meal  - Continue moderate Admelog correctional scale before meals and bedtime  - Monitor FS before meals and bedtime  - Follow hospital hypoglycemic protocol    2. CHF  - on Bumex and Coreg  - Consider Jardiance/Farxiga outpatient    3. HLD  - Continue Atorvastatin 20 mg daily    Discharge recs  1. Advised patient to resume her home regimen: Tresiba, Metformin, Novolog and Ozempic    Melissa Murray MD  [facility]- Division of Endocrinology    [address] [place] [address]    T [phone]  F [phone]

## 2024-03-26 NOTE — DISCHARGE NOTE NURSING/CASE MANAGEMENT/SOCIAL WORK - NSDCPETBCESMAN_GEN_ALL_CORE
If you are a smoker, it is important for your health to stop smoking. Please be aware that second hand smoke is also harmful. (3) slightly limited

## 2024-03-26 NOTE — PROGRESS NOTE ADULT - SUBJECTIVE AND OBJECTIVE BOX
Infectious Diseases Follow Up:    Patient is a 53y old  Female who presents with a chief complaint of Diffuse body pain, chest pain since yesterday (25 Mar 2024 17:10)      Interval History/ROS:  Still with cough, was on 1L NC ON, now on 2L     Allergies  amitriptyline (Other)  duloxetine (Other)        ANTIMICROBIALS:  cefTRIAXone   IVPB 2000 every 24 hours      Current Abx:     Previous Abx     OTHER MEDS:  MEDICATIONS  (STANDING):  albuterol/ipratropium for Nebulization 3 every 6 hours PRN  atorvastatin 20 at bedtime  benzonatate 200 three times a day PRN  bisacodyl Suppository 10 daily PRN  buMETAnide 1 daily  carvedilol 3.125 every 12 hours  dextrose 50% Injectable 25 once  dextrose 50% Injectable 12.5 once  dextrose 50% Injectable 25 once  dextrose Oral Gel 15 once PRN  enoxaparin Injectable 40 every 12 hours  famotidine    Tablet 20 daily  glucagon  Injectable 1 once  guaiFENesin  every 12 hours  insulin glargine Injectable (LANTUS) 56 at bedtime  insulin lispro (ADMELOG) corrective regimen sliding scale  three times a day before meals  insulin lispro (ADMELOG) corrective regimen sliding scale  at bedtime  insulin lispro Injectable (ADMELOG) 19 before dinner  insulin lispro Injectable (ADMELOG) 25 before breakfast  insulin lispro Injectable (ADMELOG) 6 <User Schedule>  insulin lispro Injectable (ADMELOG) 10 before lunch  melatonin 5 at bedtime PRN  naloxegol 25 daily  oxyCODONE    IR 10 every 4 hours PRN  oxyCODONE    IR 5 every 4 hours PRN  oxyCODONE  ER Tablet 10 every 12 hours  polyethylene glycol 3350 17 two times a day  pregabalin 200 three times a day  senna 2 at bedtime  tiZANidine 4 every 6 hours      Vital Signs Last 24 Hrs  T(C): 36.4 (26 Mar 2024 04:39), Max: 36.9 (25 Mar 2024 20:03)  T(F): 97.6 (26 Mar 2024 04:39), Max: 98.4 (25 Mar 2024 20:03)  HR: 56 (26 Mar 2024 04:39) (56 - 63)  BP: 107/69 (26 Mar 2024 04:39) (106/72 - 130/88)  BP(mean): --  RR: 20 (26 Mar 2024 04:39) (18 - 20)  SpO2: 100% (26 Mar 2024 04:39) (95% - 100%)    Parameters below as of 26 Mar 2024 04:39  Patient On (Oxygen Delivery Method): nasal cannula  O2 Flow (L/min): 3      PHYSICAL EXAM:  GENERAL: NAD, well-developed  HEAD:  Atraumatic, Normocephalic  EYES: EOMI, conjunctiva and sclera clear  CHEST/LUNG: Clear to auscultation bilaterally; No wheeze  HEART: Regular rate and rhythm; No murmurs, rubs, or gallops  ABDOMEN: Soft, Nontender, Nondistended; Bowel sounds present  EXTREMITIES:  2+ Peripheral Pulses, No clubbing, cyanosis, or edema  PSYCH: AAOx3                          10.9   10.98 )-----------( 442      ( 26 Mar 2024 06:52 )             34.0       03-26    133<L>  |  99  |  19  ----------------------------<  318<H>  5.1   |  19<L>  |  0.85    Ca    9.0      26 Mar 2024 06:52        Urinalysis Basic - ( 26 Mar 2024 06:52 )    Color: x / Appearance: x / SG: x / pH: x  Gluc: 318 mg/dL / Ketone: x  / Bili: x / Urobili: x   Blood: x / Protein: x / Nitrite: x   Leuk Esterase: x / RBC: x / WBC x   Sq Epi: x / Non Sq Epi: x / Bacteria: x        MICROBIOLOGY:  v  .Sputum Sputum  03-23-24   Normal Respiratory Brittany present  --    Numerous polymorphonuclear leukocytes per low power field  No Squamous epithelial cells per low power field  Moderate Gram positive cocci in pairs per oil power field  Numerous Gram Negative Rods per oil power field          Rapid RVP Result: Detected (03-25 @ 15:31)        RADIOLOGY:  < from: CT Chest No Cont (03.23.24 @ 16:48) >    FINDINGS:    LUNGS AND AIRWAYS: Patent central airways.  Groundglass opacity left   upper lobe likely infectious. Patchy bilateral lower lobe subsegmental   atelectasis.  PLEURA: No pleural effusion.  MEDIASTINUM AND CHILO: No lymphadenopathy.  VESSELS: Within normal limits.  HEART: Heart size is normal. No pericardial effusion. No   pneumopericardium.  CHEST WALL AND LOWER NECK: Within normal limits.  VISUALIZED UPPER ABDOMEN: Cholecystectomy. Spinal stimulating wires   within the spinal canal.  BONES: Within normal limits.    IMPRESSION:  Probable left upper lobe pneumonia.  No pneumopericardium

## 2024-03-26 NOTE — PROGRESS NOTE ADULT - REASON FOR ADMISSION
Diffuse body pain, chest pain since yesterday
Adult

## 2024-03-26 NOTE — DISCHARGE NOTE PROVIDER - NSDCMRMEDTOKEN_GEN_ALL_CORE_FT
acetaZOLAMIDE 250 mg oral tablet: 2 tab(s) orally 2 times a day  atorvastatin 20 mg oral tablet: 1 tab(s) orally once a day (at bedtime)  bumetanide 2 mg oral tablet: 1 tab(s) orally once a day  butalbital/acetaminophen/caffeine 50 mg-325 mg-40 mg oral tablet: 2 tab(s) orally every 8 hours as needed for migraine  carvedilol 3.125 mg oral tablet: 1 tab(s) orally every 12 hours  ergocalciferol 1.25 mg (50,000 intl units) oral capsule: 1 cap(s) orally every 7 days take on same day weekly  lisinopril 5 mg oral tablet: 1 tab(s) orally once a day  melatonin 3 mg oral tablet: 2 tab(s) orally once a day (at bedtime) As needed Sleep  metFORMIN 1000 mg oral tablet: 1 tab(s) orally 2 times a day  naloxegol 25 mg oral tablet: 1 tab(s) orally once a day  NovoLOG FlexPen 100 units/mL injectable solution: 12 unit(s) injectable 3 times a day (before meals)  oxyCODONE 15 mg oral tablet: 1 tab(s) orally every 6 hours as needed for Moderate Pain (4 - 6) MDD: 4 tabs  Ozempic 2 mg/3 mL (0.25 mg or 0.5 mg dose) subcutaneous solution: 0.5 milligram(s) subcutaneously once a week  pantoprazole 40 mg oral delayed release tablet: 1 tab(s) orally once a day (before a meal)  pregabalin 200 mg oral capsule: 1 cap(s) orally 3 times a day  spironolactone 25 mg oral tablet: 1 tab(s) orally once a day  Tresiba 100 units/mL subcutaneous solution: 45 international unit(s) subcutaneous once a day (at bedtime)   acetaZOLAMIDE 250 mg oral tablet: 2 tab(s) orally 2 times a day  atorvastatin 20 mg oral tablet: 1 tab(s) orally once a day (at bedtime)  benzonatate 100 mg oral capsule: 2 cap(s) orally 3 times a day As needed Cough  bumetanide 1 mg oral tablet: 1 tab(s) orally once a day  butalbital/acetaminophen/caffeine 50 mg-325 mg-40 mg oral tablet: 2 tab(s) orally every 8 hours as needed for migraine  carvedilol 3.125 mg oral tablet: 1 tab(s) orally every 12 hours  ergocalciferol 1.25 mg (50,000 intl units) oral capsule: 1 cap(s) orally every 7 days take on same day weekly  fluticasone 50 mcg/inh nasal spray: 1 spray(s) nasal 2 times a day  guaiFENesin 600 mg oral tablet, extended release: 1 tab(s) orally every 12 hours  lisinopril 5 mg oral tablet: 1 tab(s) orally once a day  melatonin 5 mg oral tablet: 1 tab(s) orally once a day (at bedtime) As needed Sleep  metFORMIN 1000 mg oral tablet: 1 tab(s) orally 2 times a day  naloxegol 25 mg oral tablet: 1 tab(s) orally once a day  NovoLOG FlexPen 100 units/mL injectable solution: 12 unit(s) injectable 3 times a day (before meals)  oxyCODONE 10 mg oral tablet: 1 tab(s) orally every 4 hours As needed Severe Pain (7 - 10)  oxyCODONE 10 mg oral tablet, extended release: 1 tab(s) orally every 12 hours  oxyCODONE 5 mg oral tablet: 1 tab(s) orally every 4 hours As needed Moderate Pain (4 - 6)  Ozempic 2 mg/3 mL (0.25 mg or 0.5 mg dose) subcutaneous solution: 0.5 milligram(s) subcutaneously once a week  pantoprazole 40 mg oral delayed release tablet: 1 tab(s) orally once a day (before a meal)  pregabalin 200 mg oral capsule: 1 cap(s) orally 3 times a day  spironolactone 25 mg oral tablet: 1 tab(s) orally once a day  tiZANidine 4 mg oral tablet: 1 tab(s) orally every 6 hours  Tresiba 100 units/mL subcutaneous solution: 45 international unit(s) subcutaneous once a day (at bedtime)   acetaZOLAMIDE 250 mg oral tablet: 2 tab(s) orally 2 times a day  atorvastatin 20 mg oral tablet: 1 tab(s) orally once a day (at bedtime)  benzonatate 100 mg oral capsule: 2 cap(s) orally 3 times a day as needed for Cough  bumetanide 1 mg oral tablet: 1 tab(s) orally once a day  butalbital/acetaminophen/caffeine 50 mg-325 mg-40 mg oral tablet: 2 tab(s) orally every 8 hours as needed for migraine  carvedilol 3.125 mg oral tablet: 1 tab(s) orally every 12 hours  ergocalciferol 1.25 mg (50,000 intl units) oral capsule: 1 cap(s) orally every 7 days take on same day weekly  fluticasone 50 mcg/inh nasal spray: 1 spray(s) nasal 2 times a day  guaiFENesin 600 mg oral tablet, extended release: 1 tab(s) orally every 12 hours  lisinopril 5 mg oral tablet: 1 tab(s) orally once a day  melatonin 5 mg oral tablet: 1 tab(s) orally once a day (at bedtime) As needed Sleep  metFORMIN 1000 mg oral tablet: 1 tab(s) orally 2 times a day  naloxegol 25 mg oral tablet: 1 tab(s) orally once a day  Narcan 4 mg/0.1 mL nasal spray: 1 spray(s) intranasally once a day  NovoLOG FlexPen 100 units/mL injectable solution: 12 unit(s) injectable 3 times a day (before meals)  oxyCODONE 10 mg oral tablet: 1 tab(s) orally every 4 hours as needed for Severe Pain (7 - 10) MDD: 60 mg  oxyCODONE 10 mg oral tablet, extended release: 1 tab(s) orally every 12 hours MDD: 2 tabs  oxyCODONE 5 mg oral tablet: 1 tab(s) orally every 4 hours as needed for Moderate Pain (4 - 6) MDD: 30mg  Ozempic 2 mg/3 mL (0.25 mg or 0.5 mg dose) subcutaneous solution: 0.5 milligram(s) subcutaneously once a week  pantoprazole 40 mg oral delayed release tablet: 1 tab(s) orally once a day (before a meal)  pregabalin 200 mg oral capsule: 1 cap(s) orally 3 times a day  spironolactone 25 mg oral tablet: 1 tab(s) orally once a day  tiZANidine 4 mg oral tablet: 1 tab(s) orally every 6 hours  Tresiba 100 units/mL subcutaneous solution: 45 international unit(s) subcutaneous once a day (at bedtime)   acetaZOLAMIDE 250 mg oral tablet: 2 tab(s) orally 2 times a day  atorvastatin 20 mg oral tablet: 1 tab(s) orally once a day (at bedtime)  benzonatate 100 mg oral capsule: 2 cap(s) orally 3 times a day as needed for Cough  bumetanide 1 mg oral tablet: 1 tab(s) orally once a day  butalbital/acetaminophen/caffeine 50 mg-325 mg-40 mg oral tablet: 2 tab(s) orally every 8 hours as needed for migraine  carvedilol 3.125 mg oral tablet: 1 tab(s) orally every 12 hours  ergocalciferol 1.25 mg (50,000 intl units) oral capsule: 1 cap(s) orally every 7 days take on same day weekly  fluticasone 50 mcg/inh nasal spray: 1 spray(s) nasal 2 times a day  guaiFENesin 600 mg oral tablet, extended release: 1 tab(s) orally every 12 hours  lisinopril 5 mg oral tablet: 1 tab(s) orally once a day  melatonin 5 mg oral tablet: 1 tab(s) orally once a day (at bedtime) As needed Sleep  metFORMIN 1000 mg oral tablet: 1 tab(s) orally 2 times a day  naloxegol 25 mg oral tablet: 1 tab(s) orally once a day  Narcan 4 mg/0.1 mL nasal spray: 1 spray(s) intranasally once a day  novofine needles: use with 1 insulin syringe daily  NovoFine needles for Tresiba: use 1 needle with each daily dose of Tresiba  NovoLOG FlexPen 100 units/mL injectable solution: 12 unit(s) injectable 3 times a day (before meals)  oxyCODONE 10 mg oral tablet: 1 tab(s) orally every 4 hours as needed for Severe Pain (7 - 10) MDD: 60 mg  oxyCODONE 10 mg oral tablet, extended release: 1 tab(s) orally every 12 hours MDD: 2 tabs  oxyCODONE 5 mg oral tablet: 1 tab(s) orally every 4 hours as needed for Moderate Pain (4 - 6) MDD: 30mg  Ozempic 2 mg/3 mL (0.25 mg or 0.5 mg dose) subcutaneous solution: 0.5 milligram(s) subcutaneously once a week  pantoprazole 40 mg oral delayed release tablet: 1 tab(s) orally once a day (before a meal)  pregabalin 200 mg oral capsule: 1 cap(s) orally 3 times a day  spironolactone 25 mg oral tablet: 1 tab(s) orally once a day  tiZANidine 4 mg oral tablet: 1 tab(s) orally every 6 hours  Tresiba 100 units/mL subcutaneous solution: 45 international unit(s) subcutaneous once a day (at bedtime)

## 2024-03-26 NOTE — PROGRESS NOTE ADULT - PROBLEM SELECTOR PROBLEM 1
Chronic pain syndrome

## 2024-03-26 NOTE — DISCHARGE NOTE PROVIDER - HOSPITAL COURSE
54 y/o F with PMH of CHF, HTN, HLD, DM, Idiopathic intracranial hypertension (diagnosed 2020), chronic right sided back pain (intercostal neuralgia/complex regional pain syndrome) s/p spinal stimulator p/w poorly controlled back pain. Now found to have parainfluenza     Problem/Plan - 1:  ·  Problem: Chronic pain syndrome.   ·  Plan: -Chronic pain team following   -Oxycontin 10mg BID, Oxycodone 5 mg every 4 hrs PRN, lyrica 200mg TID, tizanidine 4mg q6h  -Bowel regimen while on opiates - naloxegol, senna, miralax, lactulose, dulcolax suppository.     Problem/Plan - 2:  ·  Problem: Gram-negative pneumonia.   ·  Plan: -Afebrile, no leukocytosis. Now w/ persistent cough and on 3L NC.   -RVP negative.   -CXR 2/23 w/ c/f possible pneumopericardium  -CT Chest: no pneumopericardium. Possible JAYLEN PNA   -S/p IV ceftriaxone   -tessalangelic malone, Mucinex, duonebs prn   -Wean O2 as tolerated  -Follow up CT chest in 6-8 weeks  -ID eval.     Problem/Plan - 3:  ·  Problem: Acute respiratory failure with hypoxia.   ·  Plan: -Likely due to parainfluenza  -supportive care   -tessalon pearle, Mucinex, duonebs prn   -also has hx of asthma, has been evaluated for STAS  -sating 99% on room air at rest and 90% on ambulation.     Problem/Plan - 4:  ·  Problem: Parainfluenza.   ·  Plan: -management as above  -improved.     Problem/Plan - 5:  ·  Problem: Chronic diastolic congestive heart failure.   ·  Plan: -TTE: EF 65%, normal diastolic function, no significant changes compared to prior echo.   -bumex reduced dose 1mg daily, coreg 3.125mg BID.     Problem/Plan - 6:  ·  Problem: Urinary retention.   ·  Plan: -UA and Renal US grossly unremarkable   -Possible due to constipation; bowel regimen as above  -Passed TOV.     Problem/Plan - 7:  ·  Problem: Hypomagnesemia.   ·  Plan: -HypoMg, HypoCa, HypoK  -Replete prn  -Monitor BMP.     Problem/Plan - 8:  ·  Problem: Type 2 diabetes mellitus.   ·  Plan: -Carb consistent diet   -A1C 7.3  -Insulin recs per endo.     Problem/Plan - 9:  ·  Problem: Need for prophylactic measure.   ·  Plan: DVT ppx: Lovenox  PT -outpt PT.

## 2024-03-26 NOTE — PROGRESS NOTE ADULT - TIME BILLING
reviewing documentation/labs/imaging, interviewing and examining patient, documentation, coordinating care with ACP/CM/Specialists
reviewing documentation/labs/imaging, interviewing and examining patient, documentation, coordinating care with ACP/CM/Specialists
DC home 50min
reviewing documentation/labs/imaging, interviewing and examining patient, documentation, coordinating care with ACP/CM/Specialists

## 2024-03-26 NOTE — PROGRESS NOTE ADULT - PROBLEM SELECTOR PLAN 3
-Likely due to pna   -management as above   -also has hx of asthma, has been evaluated for STAS  -pulm eval   -wean O2
-Likely due to parainfluenza  -supportive care   -tessalon pearle, Mucinex, jolene prn   -also has hx of asthma, has been evaluated for STAS  -sating 99% on room air at rest and 90% on ambulation
HypoMg, HypoCa, HypoK  -Replete prn  -F/u BMP
TTE: EF 65%, normal diastolic function, no significant changes compared to prior echo.   bumex reduced dose 1mg daily, coreg 3.125mg BID  holding home spironolactone for now pending BP trend
HypoMg, HypoCa, HypoK  -Replete prn  -Monitor BMP
HypoMg, HypoCa, HypoK  -Replete prn  -F/u BMP
TTE: EF 65%, normal diastolic function, no significant changes compared to prior echo.   bumex 1mg daily and spironolactone 25mg daily

## 2024-03-26 NOTE — PROGRESS NOTE ADULT - PROVIDER SPECIALTY LIST ADULT
CT ABDOMEN/PELVIS WITH IV CONTRAST:

 

TECHNIQUE:  Axial contrast enhanced images from the lung bases to the pubic

symphysis using 100 mL Isovue 370 intravenous contrast material with multiplanar

reformations.

 

Visualized lung bases demonstrate fibrotic changes.  There is mild to moderate

pericardial fluid, which appears similar to the prior study of 05/23/2009. The

liver, gallbladder, spleen, adrenals, pancreas are unremarkable. There is a cyst

in the lower pole of the right kidney posteriorly 1.2 cm in diameter. There is no

hydronephrosis. No perirenal inflammation is seen. There is moderate

atherosclerotic calcification of the abdominal aorta without aneurysm. No

periaortic adenopathy is seen with multiple subcentimeter lymph nodes identified.

However, just below the aortic bifurcation, there is ill-defined soft tissue

density measuring about 3.5 x 5.5 cm having an appearance most compatible with

retroperitoneal fibrosis. There is no free air or free fluid. No bowel wall

thickening is seen. I see no pelvic mass. The urinary bladder is mildly distended

but not optimally evaluated. There are degenerative changes of the spine.

 

IMPRESSION:

 

Mild to moderate pericardial fluid is unchanged since 05/23/2009. No free air or

free fluid.

 

 Ill-defined soft tissue density just beneath the aortic bifurcation measures

about 5.5 x 3.5 cm. This does not appear to be masslike but, rather, I feel this

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 This appears to have been

present on the prior study 5/23/09, with ill defined soft tissue density at the

same location approximately 2 cm in diameter.

 

There is a 1.2 cm cyst lower pole right kidney without hydronephrosis. No other

definite acute findings.

 

 

Electronically Signed by

Kahlil Black MD 10/26/2019 10:56 A
Endocrinology
Internal Medicine
Pulmonology
Endocrinology
Infectious Disease
Hospitalist
Internal Medicine
Internal Medicine
Hospitalist
Internal Medicine
Internal Medicine

## 2024-03-26 NOTE — PROGRESS NOTE ADULT - PROBLEM SELECTOR PLAN 4
-Carb consistent diet   -A1C 7.3  -Insulin recs per endo
HypoMg, HypoCa, HypoK  -Replete prn  -Monitor BMP
-Lantus 30u qhs, lispro 6u TID, ISS   -Carb consistent diet   -A1C 7.3  -F/u endo recs
-Lantus 30u qhs, lispro 6u TID, ISS   -Carb consistent diet   -A1C 7.3
HypoMg, HypoCa, HypoK  -Replete prn  -Monitor BMP
-TTE: EF 65%, normal diastolic function, no significant changes compared to prior echo.   -bumex reduced dose 1mg daily, coreg 3.125mg BID  -holding home spironolactone for now pending BP trend
-management as above  -improved

## 2024-03-26 NOTE — PROGRESS NOTE ADULT - PROBLEM SELECTOR PROBLEM 5
Need for prophylactic measure
Need for prophylactic measure
Urinary retention
Type 2 diabetes mellitus
Type 2 diabetes mellitus
Need for prophylactic measure
Chronic diastolic congestive heart failure

## 2024-03-26 NOTE — PROGRESS NOTE ADULT - SUBJECTIVE AND OBJECTIVE BOX
[facility] - Division of Pulmonary, Critical Care and Sleep Medicine   Please call [phone] between 8am-pm weekdays, [phone] after hours and weekends    Interval Events: no acute events overnight. Found to have parainfluenza 3. Now in isolation room. Improved dyspnea, stable cough. No hypoxemia at rest or with ambulation.    REVIEW OF SYSTEMS:  Constitutional: no fever, chills, fatigue  Neuro: no headache, numbness, weakness  Resp: +cough and dyspnea, improved  CVS: no chest pain, palpitations, leg swelling  GI: no abdominal pain, nausea, vomiting, diarrhea   : no dysuria, frequency, incontinence  Skin: no itching, burning, rashes, or lesions   Msk: diffuse arthralgias  Psych: no depression, anxiety  [x ] All other systems negative    OBJECTIVE:  ICU Vital Signs Last 24 Hrs  T(C): 36.4 (26 Mar 2024 04:39), Max: 36.9 (25 Mar 2024 20:03)  T(F): 97.6 (26 Mar 2024 04:39), Max: 98.4 (25 Mar 2024 20:03)  HR: 56 (26 Mar 2024 04:39) (56 - 63)  BP: 107/69 (26 Mar 2024 04:39) (106/72 - 122/71)  RR: 20 (26 Mar 2024 04:39) (18 - 20)  SpO2: 96% (26 Mar 2024 10:05) (96% - 100%)    O2 Parameters below as of 26 Mar 2024 10:05  Patient On (Oxygen Delivery Method): room air  O2 Flow (L/min): 0      03-25 @ 07:01  -  03-26 @ 07:00  --------------------------------------------------------  IN: 1700 mL / OUT: 2400 mL / NET: -700 mL    CAPILLARY BLOOD GLUCOSE    POCT Blood Glucose.: 198 mg/dL (26 Mar 2024 13:17)    PHYSICAL EXAM:  General:  NAD; AAOx3; obese  Neuro: CN II-XII grossly intact; moving all extremities   HEENT: NC/AT; EOMI, MMM  CV: normal S1 & S2; regular rate and rhythm   Pulm: +coughing during interview; lungs are clear to auscultation without wheezing or rales  GI: soft; NT/ND  Ext: trace leg edema bilaterally  Skin: warm and well perfused    HOSPITAL MEDICATIONS:  MEDICATIONS  (STANDING):  atorvastatin 20 milliGRAM(s) Oral at bedtime  buMETAnide 1 milliGRAM(s) Oral daily  carvedilol 3.125 milliGRAM(s) Oral every 12 hours  cefTRIAXone   IVPB 2000 milliGRAM(s) IV Intermittent every 24 hours  enoxaparin Injectable 40 milliGRAM(s) SubCutaneous every 12 hours  famotidine    Tablet 20 milliGRAM(s) Oral daily  glucagon  Injectable 1 milliGRAM(s) IntraMuscular once  guaiFENesin  milliGRAM(s) Oral every 12 hours  insulin glargine Injectable (LANTUS) 56 Unit(s) SubCutaneous at bedtime  insulin lispro (ADMELOG) corrective regimen sliding scale   SubCutaneous three times a day before meals  insulin lispro (ADMELOG) corrective regimen sliding scale   SubCutaneous at bedtime  insulin lispro Injectable (ADMELOG) 25 Unit(s) SubCutaneous before breakfast  insulin lispro Injectable (ADMELOG) 6 Unit(s) SubCutaneous <User Schedule>  insulin lispro Injectable (ADMELOG) 10 Unit(s) SubCutaneous before lunch  insulin lispro Injectable (ADMELOG) 19 Unit(s) SubCutaneous before dinner  naloxegol 25 milliGRAM(s) Oral daily  oxyCODONE  ER Tablet 10 milliGRAM(s) Oral every 12 hours  polyethylene glycol 3350 17 Gram(s) Oral two times a day  pregabalin 200 milliGRAM(s) Oral three times a day  senna 2 Tablet(s) Oral at bedtime  tiZANidine 4 milliGRAM(s) Oral every 6 hours    MEDICATIONS  (PRN):  albuterol/ipratropium for Nebulization 3 milliLiter(s) Nebulizer every 6 hours PRN Shortness of Breath and/or Wheezing  benzonatate 200 milliGRAM(s) Oral three times a day PRN Cough  bisacodyl Suppository 10 milliGRAM(s) Rectal daily PRN Constipation  dextrose Oral Gel 15 Gram(s) Oral once PRN Blood Glucose LESS THAN 70 milliGRAM(s)/deciliter  melatonin 5 milliGRAM(s) Oral at bedtime PRN Sleep  oxyCODONE    IR 5 milliGRAM(s) Oral every 4 hours PRN Moderate Pain (4 - 6)  oxyCODONE    IR 10 milliGRAM(s) Oral every 4 hours PRN Severe Pain (7 - 10)      LABS:                        10.9   10.98 )-----------( 442      ( 26 Mar 2024 06:52 )             34.0     Hgb Trend: 10.9<--, 10.8<--, 11.2<--, 10.6<--, 10.8<--  03-26    133<L>  |  99  |  19  ----------------------------<  318<H>  5.1   |  19<L>  |  0.85    Ca    9.0      26 Mar 2024 06:52      Creatinine Trend: 0.85<--, 0.93<--, 0.83<--, 0.86<--, 0.78<--, 0.92<--    Urinalysis Basic - ( 26 Mar 2024 06:52 )    Color: x / Appearance: x / SG: x / pH: x  Gluc: 318 mg/dL / Ketone: x  / Bili: x / Urobili: x   Blood: x / Protein: x / Nitrite: x   Leuk Esterase: x / RBC: x / WBC x   Sq Epi: x / Non Sq Epi: x / Bacteria: x    RVP + for Parainfluenza 3    Sputum culture: normal respiratory ermias    Urine Legionella negative    Nasal MRSA/MSSA negative    Lower extremity duplex (3/26/24) negative    CT chest (3/23/24): patent central airways, groundglass opacity in the JAYLEN likely infectious, patchy bilateral lower lobe subsegmental atelectasis. No pleural effusion. No lymphadenopathy.

## 2024-03-26 NOTE — PROGRESS NOTE ADULT - SUBJECTIVE AND OBJECTIVE BOX
[facility] Progress Note    MAR, chart notes, fingersticks and labs reviewed    Subjective: going home today    Objective  Vital Signs  T(C): 36.4 (03-26-24 @ 04:39), Max: 36.9 (03-25-24 @ 20:03)  HR: 56 (03-26-24 @ 04:39) (56 - 63)  BP: 107/69 (03-26-24 @ 04:39) (106/72 - 122/71)  RR: 20 (03-26-24 @ 04:39) (18 - 20)  SpO2: 96% (03-26-24 @ 10:05) (96% - 100%)    Physical Exam  Gen: NAD, alert, awake  HEENT: NC/AT, EOMI  Neck: supple  Chest: breathing comfortably  Heart: +s1 +s2    Medications  albuterol/ipratropium for Nebulization 3 milliLiter(s) Nebulizer every 6 hours PRN  atorvastatin 20 milliGRAM(s) Oral at bedtime  benzonatate 200 milliGRAM(s) Oral three times a day PRN  bisacodyl Suppository 10 milliGRAM(s) Rectal daily PRN  buMETAnide 1 milliGRAM(s) Oral daily  carvedilol 3.125 milliGRAM(s) Oral every 12 hours  dextrose 5%. 1000 milliLiter(s) IV Continuous <Continuous>  dextrose 5%. 1000 milliLiter(s) IV Continuous <Continuous>  dextrose 50% Injectable 25 Gram(s) IV Push once  dextrose 50% Injectable 12.5 Gram(s) IV Push once  dextrose 50% Injectable 25 Gram(s) IV Push once  dextrose Oral Gel 15 Gram(s) Oral once PRN  enoxaparin Injectable 40 milliGRAM(s) SubCutaneous every 12 hours  famotidine    Tablet 20 milliGRAM(s) Oral daily  fluticasone propionate 50 MICROgram(s)/spray Nasal Spray 1 Spray(s) Both Nostrils two times a day  glucagon  Injectable 1 milliGRAM(s) IntraMuscular once  guaiFENesin  milliGRAM(s) Oral every 12 hours  insulin glargine Injectable (LANTUS) 56 Unit(s) SubCutaneous at bedtime  insulin lispro (ADMELOG) corrective regimen sliding scale   SubCutaneous three times a day before meals  insulin lispro (ADMELOG) corrective regimen sliding scale   SubCutaneous at bedtime  insulin lispro Injectable (ADMELOG) 25 Unit(s) SubCutaneous before breakfast  insulin lispro Injectable (ADMELOG) 6 Unit(s) SubCutaneous <User Schedule>  insulin lispro Injectable (ADMELOG) 10 Unit(s) SubCutaneous before lunch  insulin lispro Injectable (ADMELOG) 19 Unit(s) SubCutaneous before dinner  melatonin 5 milliGRAM(s) Oral at bedtime PRN  naloxegol 25 milliGRAM(s) Oral daily  oxyCODONE    IR 5 milliGRAM(s) Oral every 4 hours PRN  oxyCODONE    IR 10 milliGRAM(s) Oral every 4 hours PRN  oxyCODONE  ER Tablet 10 milliGRAM(s) Oral every 12 hours  polyethylene glycol 3350 17 Gram(s) Oral two times a day  pregabalin 200 milliGRAM(s) Oral three times a day  senna 2 Tablet(s) Oral at bedtime  tiZANidine 4 milliGRAM(s) Oral every 6 hours    Pertinent labs/Imaging  POCT Blood Glucose.: 203 mg/dL (03-26-24 @ 16:41)  POCT Blood Glucose.: 198 mg/dL (03-26-24 @ 13:17)  POCT Blood Glucose.: 281 mg/dL (03-26-24 @ 08:42)  POCT Blood Glucose.: 210 mg/dL (03-25-24 @ 21:20)    eGFR: 82 mL/min/1.73m2 (03-26-24 @ 06:52)  eGFR: 73 mL/min/1.73m2 (03-25-24 @ 06:30)

## 2024-03-27 ENCOUNTER — APPOINTMENT (OUTPATIENT)
Dept: HOME HEALTH SERVICES | Facility: HOME HEALTH | Age: 54
End: 2024-03-27

## 2024-03-27 VITALS
SYSTOLIC BLOOD PRESSURE: 133 MMHG | RESPIRATION RATE: 18 BRPM | TEMPERATURE: 208.58 F | HEART RATE: 68 BPM | OXYGEN SATURATION: 97 % | DIASTOLIC BLOOD PRESSURE: 68 MMHG

## 2024-03-27 RX ORDER — KETOROLAC TROMETHAMINE 10 MG/1
10 TABLET, FILM COATED ORAL EVERY 8 HOURS
Refills: 0 | Status: DISCONTINUED | COMMUNITY
Start: 2024-03-05 | End: 2024-03-27

## 2024-03-27 RX ORDER — TIZANIDINE 2 MG/1
2 TABLET ORAL 3 TIMES DAILY
Refills: 0 | Status: DISCONTINUED | COMMUNITY
Start: 2024-03-05 | End: 2024-03-27

## 2024-03-27 RX ORDER — SEMAGLUTIDE 0.68 MG/ML
2 INJECTION, SOLUTION SUBCUTANEOUS
Qty: 1 | Refills: 0 | Status: COMPLETED | COMMUNITY
Start: 2024-03-05 | End: 2024-04-04

## 2024-03-27 RX ORDER — BACLOFEN 5 MG/1
5 TABLET ORAL EVERY 8 HOURS
Refills: 0 | Status: DISCONTINUED | COMMUNITY
End: 2024-03-27

## 2024-03-27 RX ORDER — HYDROMORPHONE HYDROCHLORIDE 4 MG/1
4 TABLET ORAL
Refills: 0 | Status: DISCONTINUED | COMMUNITY
Start: 2024-03-05 | End: 2024-03-27

## 2024-03-28 ENCOUNTER — APPOINTMENT (OUTPATIENT)
Dept: PAIN MANAGEMENT | Facility: CLINIC | Age: 54
End: 2024-03-28

## 2024-04-02 ENCOUNTER — APPOINTMENT (OUTPATIENT)
Dept: UROLOGY | Facility: CLINIC | Age: 54
End: 2024-04-02

## 2024-04-05 ENCOUNTER — APPOINTMENT (OUTPATIENT)
Dept: HOME HEALTH SERVICES | Facility: HOME HEALTH | Age: 54
End: 2024-04-05
Payer: MEDICARE

## 2024-04-05 VITALS
HEART RATE: 72 BPM | TEMPERATURE: 97.88 F | OXYGEN SATURATION: 98 % | RESPIRATION RATE: 18 BRPM | SYSTOLIC BLOOD PRESSURE: 130 MMHG | DIASTOLIC BLOOD PRESSURE: 70 MMHG

## 2024-04-05 DIAGNOSIS — J45.909 UNSPECIFIED ASTHMA, UNCOMPLICATED: ICD-10-CM

## 2024-04-05 PROCEDURE — 99495 TRANSJ CARE MGMT MOD F2F 14D: CPT

## 2024-04-07 PROBLEM — J45.909 ASTHMA: Status: ACTIVE | Noted: 2020-10-27

## 2024-04-07 RX ORDER — TIRZEPATIDE 2.5 MG/.5ML
2.5 INJECTION, SOLUTION SUBCUTANEOUS
Qty: 2 | Refills: 0 | Status: COMPLETED | COMMUNITY
Start: 2024-04-01

## 2024-04-07 RX ORDER — ACETAZOLAMIDE 250 MG/1
250 TABLET ORAL
Qty: 120 | Refills: 0 | Status: COMPLETED | COMMUNITY
Start: 2024-03-28

## 2024-04-07 NOTE — COUNSELING
[Improve mobility] : improve mobility [Improve weight] : improve weight [Improve pain control] : improve pain control [Decrease stress] : decrease stress [Decrease hospital use] : decrease hospital use [Minimize unnecessary interventions] : minimize unnecessary interventions [Completed Medical Orders for Life-Sustaining Treatment] : completed medical orders for life-sustaining treatment [Full Code] : Code Status: Full Code [No Limitations] : Treatment Guidelines: No limitations [Long Term Intubation] : Intubation: Long term intubation [[facility] Ver[facility] Date: _____] : [facility]last verification date: [unfilled] [_____] : HCP: [unfilled] [Obese (BMI >29.9)] : Obese - BMI >29.9 [DASH diet given] : DASH diet given [Non - Smoker] : non-smoker [Use assistive device to avoid falls] : use assistive device to avoid falls [Remove clutter and unsafe carpeting to avoid falls] : remove clutter and unsafe carpeting to avoid falls [Mammogram] : Mammogram

## 2024-04-08 ENCOUNTER — APPOINTMENT (OUTPATIENT)
Age: 54
End: 2024-04-08

## 2024-04-08 NOTE — HEALTH RISK ASSESSMENT
[FreeTextEntry1] : shower chair, grab bars [FreeTextEntry2] : A assist [FreeTextEntry4] : Raised toilet seat [FreeTextEntry8] : has walker/wheelchair if needed. [Full assistance needed] : using transportation [Independent] : managing finances [Some assistance needed] : using transportation [No falls in past year] : Patient reported no falls in the past year [HRA Reviewed] : Health Risk Assessment reviewed [facility] : [facility]

## 2024-04-08 NOTE — HISTORY OF PRESENT ILLNESS
[House Calls Co-Management Patient] : [unfilled] is a House Calls co-management patient [In-Place] : has Home Health services in-place [PT] : PT [OT] : OT [A] : A [Patient is stable - had PCP appoinment] : patient is stable - had PCP appointment [Patient] : patient [LastPCPVisitDate] : 3/4/2024 [FreeTextEntry1] : [facility] [FreeTextEntry2] : COVID SCREEN: Patient or caretaker denies fever, cough, trouble breathing, rash, vomiting. Patient has not been in close contact with anyone who is COVID-19 positive, or suspected of having COVID-19.  N95 mask, gloves, eye wear and gown (if indicated) used during visit: Yes.  HPI: 54yo F with PMHX of bipolar II disorder, depression, class III obesity (41 kg/m2), IBS-D, T2DM, GERD, asthma. high triglyceride, HTN, low back pain. Seen today for admission to house calls program.   53-year-old female being seen for an initial visit, met with patient during initial visit, Patient reports that she currently has 30 hours of CDPAP (daughter is her aide) via [employer]. Pt receives PT 2x per week until April. Patient independently provided all information. Patient was on the program in the past but due to admission at [facility] for LTC patient was discharged in 2023. Pt was in the NH due to being in coma for about a week and required JAI. Patient was admitted to [facility] in February and discharged home. Patient reports that she has one daughter and a close friend that is involved. 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  Interval Events: -Patient is being seen for post hospitalization for parainfluenza. Patient is feeling much better since discharge. -Patient have a green tank but request a POC as she use the walker outside of her home anna it s difficult to maneuver. -Outpatient PT - Patients have an appointment with Pain management for chronic pain. Dr Lizabeth Etienne. -Patient was discharge with lara in place, pt discontinue the lara by herself she said it was uncomfortable. -Patient have urology f/u with Dr Pricila GUERRA  Patient stated he saw kidney doctor who stated pt have CKD stage 3. Patient did not elaborate any more.   Medical Issues:  Asthma- on Nebulizer, Inhaler  HTN- On Lisinopril high Triglyceride- ON Atorvastatin Bipolar disorder- Not on meds at this time.  DM- On Insulin Chronic Pain, Lyrica, Oxycontin and oxycodone. Subjective: 1. Appetite/Weight: Class III obesity with BMI ~42, appetite fair, weight loss 20 lbs, 330ls 3/4 2. Gait/Falls: Steady gait, Cane dependent. No falls 3. Sleep: fair 4. BMs: No concerns, take, meds 5. Urine: No concerns since lara discontinue 6. Skin: No rash or ulcers. 7. Mood/Memory: Notes recent small memory lapses, mood fair not depressed, [email or web address] support system. 8. Hospitalization: comma 22 to 2022 to Nursing Home d/c 2024  to  [facility] DME: Cane, Oxygen concentrator, Nebulizer, Glucometer w/c, rollator, shower chair, raised toilet sit.  Supply: -Patient have a green tank but request a POC as she use the walker outside of her home anna it s difficult to maneuver  Social: Lives with daughter Caregiver: Daughter is CDPAP AIDE 30 hours / week MOLST: Full Code HCP-  -PCP Anirudh Maldonado, all blood work done by her. and pain meds prescribed and manage by him   ADVANCE CARE PLANNIN. Total Time Spent: 20 min 2. Participants: Myself, patient, SW 3. Discussion: Goals of Care, Advanced Directives, Health Care Agency, and Disease trajectory 4. Disease Trajectory: Discussed and reviewed that patient's health is currently stable, and the prognosis moving forward is unclear. 5. Prognosis, Based On Clinician Best Judgment: Indeterminate 6. Goals of Care: 1) Extend life, by hospitalization and aggressive measures if needed, 2) Avoid discomfort, 3) Manage medical problems at home where safely possible. 7. HCA: Friend Tony primary, cousin Shashank secondary. 8. MOLST Completed: CPR yes, intubation long term, do hospitalize, no limitation on medical interventions, trial feeding tube, trial IVF, use antibiotics, Dialysis. 9. Hospice Benefit discussion deferred at this time.

## 2024-04-08 NOTE — PHYSICAL EXAM
[No Acute Distress] : no acute distress [Normal Voice/Communication] : normal voice communication [Normal Sclera/Conjunctiva] : normal sclera/conjunctiva [PERRL] : pupils equal, round and reactive to light [Normal Outer Ear/Nose] : the ears and nose were normal in appearance [Normal Oropharynx] : the oropharynx was normal [No JVD] : no jugular venous distention [Supple] : the neck was supple [No Respiratory Distress] : no respiratory distress [Clear to Auscultation] : lungs were clear to auscultation bilaterally [No Accessory Muscle Use] : no accessory muscle use [Normal Rate] : heart rate was normal  [Regular Rhythm] : with a regular rhythm [Normal Bowel Sounds] : normal bowel sounds [Non Tender] : non-tender [Soft] : abdomen soft [No CVA Tenderness] : no ~M costovertebral angle tenderness [No Spinal Tenderness] : no spinal tenderness [Normal Gait] : normal gait [No Rash] : no rash [No Skin Lesions] : no skin lesions [Oriented x3] : oriented to person, place, and time [de-identified] : +1 edema Isaak leg pt encourage to elevate legs when sitting.

## 2024-04-08 NOTE — REASON FOR VISIT
[Post-hospitalization from ___ Hospital] : Post-hospitalization from [unfilled] Hospital [Admitted on: ___] : The patient was admitted on [unfilled] [Discharge Summary] : discharge summary [Pertinent Labs] : pertinent labs [Discharge Med List] : discharge medication list [Med Reconciliation] : medication reconciliation has been completed [Patient Contacted By: ____] : and contacted by [unfilled] [Pre-Visit Preparation] : Pre-visit preparation was done [Intercurrent Specialty/Sub-specialty Visits] : The patient has intercurrent specialty/sub-specialty visits [FreeTextEntry2] : Hospital Course:  Discharge Date 26-Mar-2024  Admission Date 19-Mar-2024 22:52  Reason for Admission Diffuse body pain, chest pain since yesterday  Hospital Course   54 y/o F with PMH of CHF, HTN, HLD, DM, Idiopathic intracranial hypertension (diagnosed 2020), chronic right sided back pain (intercostal neuralgia/complex regional pain syndrome) s/p spinal stimulator p/w poorly controlled back pain. Now found to have parainfluenza  Problem/Plan - 1: -  Problem: Chronic pain syndrome. -  Plan: -Chronic pain team following -Oxycontin 10mg BID, Oxycodone 5 mg every 4 hrs PRN, lyrica 200mg TID, tizanidine 4mg q6h -Bowel regimen while on opiates - naloxegol, senna, miralax, lactulose, dulcolax suppository.   Problem/Plan - 2: -  Problem: Gram-negative pneumonia. -  Plan: -Afebrile, no leukocytosis. Now w/ persistent cough and on 3L NC. -RVP negative. -CXR 2/23 w/ c/f possible pneumopericardium -CT Chest: no pneumopericardium. Possible JAYLEN PNA -S/p IV ceftriaxone -tessalon pearle, Mucinex, duonebs prn -Wean O2 as tolerated -Follow up CT chest in 6-8 weeks -ID eval.  Problem/Plan - 3: -  Problem: Acute respiratory failure with hypoxia. -  Plan: -Likely due to parainfluenza -supportive care -tessalon pearle, Mucinex, duonebs prn -also has hx of asthma, has been evaluated for STAS -sating 99% on room air at rest and 90% on ambulation.  Problem/Plan - 4: -  Problem: Parainfluenza. -  Plan: -management as above -improved.  Problem/Plan - 5: -  Problem: Chronic diastolic congestive heart failure. -  Plan: -TTE: EF 65%, normal diastolic function, no significant changes compared to prior echo. -bumex reduced dose 1mg daily, coreg 3.125mg BID.  Problem/Plan - 6: -  Problem: Urinary retention. -  Plan: -UA and Renal US grossly unremarkable -Possible due to constipation; bowel regimen as above -Passed TOV.  Problem/Plan - 7: -  Problem: Hypomagnesemia. -  Plan: -HypoMg, HypoCa, HypoK -Replete prn -Monitor BMP.  Problem/Plan - 8: -  Problem: Type 2 diabetes mellitus. -  Plan: -Carb consistent diet -A1C 7.3 -Insulin recs per endo.  Problem/Plan - 9: -  Problem: Need for prophylactic measure. -  Plan: DVT ppx: Lovenox PT -outpt PT. Med Reconciliation: Override IMPROVE-DD recommendations due to: IMPROVE-DD Application Not Available Recommended Post-Discharge VTE Prophylaxis IMPROVE-DD Application Not Available Medication Reconciliation Status Admission Reconciliation is Completed Discharge Reconciliation is Completed Discharge Medications acetaZOLAMIDE 250 mg oral tablet: 2 tab(s) orally 2 times a day atorvastatin 20 mg oral tablet: 1 tab(s) orally once a day (at bedtime) benzonatate 100 mg oral capsule: 2 cap(s) orally 3 times a day as needed for Cough bumetanide 1 mg oral tablet: 1 tab(s) orally once a day butalbital/acetaminophen/caffeine 50 mg-325 mg-40 mg oral tablet: 2 tab(s) orally every 8 hours as needed for migraine carvedilol 3.125 mg oral tablet: 1 tab(s) orally every 12 hours ergocalciferol 1.25 mg (50,000 intl units) oral capsule: 1 cap(s) orally every 7 days take on same day weekly fluticasone 50 mcg/inh nasal spray: 1 spray(s) nasal 2 times a day guaiFENesin 600 mg oral tablet, extended release: 1 tab(s) orally every 12 hours lisinopril 5 mg oral tablet: 1 tab(s) orally once a day melatonin 5 mg oral tablet: 1 tab(s) orally once a day (at bedtime) As needed Sleep metFORMIN 1000 mg oral tablet: 1 tab(s) orally 2 times a day naloxegol 25 mg oral tablet: 1 tab(s) orally once a day Narcan 4 mg/0.1 mL nasal spray: 1 spray(s) intranasally once a day novofine needles: use with 1 insulin syringe daily NovoFine needles for Tresiba: use 1 needle with each daily dose of Tresiba NovoLOG FlexPen 100 units/mL injectable solution: 12 unit(s) injectable 3 times a day (before meals) oxyCODONE 10 mg oral tablet: 1 tab(s) orally every 4 hours as needed for Severe Pain (7 - 10) MDD: 60 mg oxyCODONE 10 mg oral tablet, extended release: 1 tab(s) orally every 12 hours MDD: 2 tabs oxyCODONE 5 mg oral tablet: 1 tab(s) orally every 4 hours as needed for Moderate Pain (4 - 6) MDD: 30mg Ozempic 2 mg/3 mL (0.25 mg or 0.5 mg dose) subcutaneous solution: 0.5 milligram(s) subcutaneously once a week pantoprazole 40 mg oral delayed release tablet: 1 tab(s) orally once a day (before a meal) pregabalin 200 mg oral capsule: 1 cap(s) orally 3 times a day spironolactone 25 mg oral tablet: 1 tab(s) orally once a day tiZANidine 4 mg oral tablet: 1 tab(s) orally every 6 hours Tresiba 100 units/mL subcutaneous solution: 45 international unit(s) subcutaneous once a day (at bedtime) Care Plan/Procedures: Discharge Diagnoses, Assessment and Plan of Treatment PRINCIPAL DISCHARGE DIAGNOSIS Diagnosis: Parainfluenza Assessment and Plan of Treatment: Follow up with Pulmonary outpatient Dr Davin Salas. Follow up with repeat CT chest in 8 weeks to confirm clearance of left upper lobe consolidation. Continue home supplemental oxygen as needed for sleep and during awake time based on your pulse Ox levels or shortness of breath. Contact your oxygen supplier company for additional portable tanks as needed. SECONDARY DISCHARGE DIAGNOSES Diagnosis: Chronic pain syndrome Assessment and Plan of Treatment: Follow up with pain management Diagnosis: Urinary retention Assessment and Plan of Treatment: Follow up with the urologist within 1-2 weeks for urinary catheter reevaluation. [FreeTextEntry4] : Chart reviewed

## 2024-04-11 ENCOUNTER — APPOINTMENT (OUTPATIENT)
Dept: NEPHROLOGY | Facility: CLINIC | Age: 54
End: 2024-04-11

## 2024-04-11 ENCOUNTER — OUTPATIENT (OUTPATIENT)
Dept: OUTPATIENT SERVICES | Facility: HOSPITAL | Age: 54
LOS: 1 days | End: 2024-04-11

## 2024-04-11 VITALS
HEART RATE: 63 BPM | RESPIRATION RATE: 16 BRPM | DIASTOLIC BLOOD PRESSURE: 68 MMHG | TEMPERATURE: 98 F | OXYGEN SATURATION: 97 % | HEIGHT: 68 IN | SYSTOLIC BLOOD PRESSURE: 145 MMHG | WEIGHT: 293 LBS

## 2024-04-11 DIAGNOSIS — E11.9 TYPE 2 DIABETES MELLITUS WITHOUT COMPLICATIONS: ICD-10-CM

## 2024-04-11 DIAGNOSIS — R22.0 LOCALIZED SWELLING, MASS AND LUMP, HEAD: ICD-10-CM

## 2024-04-11 DIAGNOSIS — Z90.49 ACQUIRED ABSENCE OF OTHER SPECIFIED PARTS OF DIGESTIVE TRACT: Chronic | ICD-10-CM

## 2024-04-11 DIAGNOSIS — J45.909 UNSPECIFIED ASTHMA, UNCOMPLICATED: ICD-10-CM

## 2024-04-11 DIAGNOSIS — Z86.79 PERSONAL HISTORY OF OTHER DISEASES OF THE CIRCULATORY SYSTEM: ICD-10-CM

## 2024-04-11 DIAGNOSIS — G89.29 OTHER CHRONIC PAIN: ICD-10-CM

## 2024-04-11 DIAGNOSIS — I10 ESSENTIAL (PRIMARY) HYPERTENSION: ICD-10-CM

## 2024-04-11 DIAGNOSIS — Z98.890 OTHER SPECIFIED POSTPROCEDURAL STATES: Chronic | ICD-10-CM

## 2024-04-11 DIAGNOSIS — E66.9 OBESITY, UNSPECIFIED: ICD-10-CM

## 2024-04-11 DIAGNOSIS — Z96.82 PRESENCE OF NEUROSTIMULATOR: Chronic | ICD-10-CM

## 2024-04-11 LAB
ALBUMIN SERPL ELPH-MCNC: 3.9 G/DL — SIGNIFICANT CHANGE UP (ref 3.3–5)
ALP SERPL-CCNC: 127 U/L — HIGH (ref 40–120)
ALT FLD-CCNC: 16 U/L — SIGNIFICANT CHANGE UP (ref 4–33)
ANION GAP SERPL CALC-SCNC: 13 MMOL/L — SIGNIFICANT CHANGE UP (ref 7–14)
AST SERPL-CCNC: 13 U/L — SIGNIFICANT CHANGE UP (ref 4–32)
BILIRUB SERPL-MCNC: 0.4 MG/DL — SIGNIFICANT CHANGE UP (ref 0.2–1.2)
BUN SERPL-MCNC: 17 MG/DL — SIGNIFICANT CHANGE UP (ref 7–23)
CALCIUM SERPL-MCNC: 8.5 MG/DL — SIGNIFICANT CHANGE UP (ref 8.4–10.5)
CHLORIDE SERPL-SCNC: 100 MMOL/L — SIGNIFICANT CHANGE UP (ref 98–107)
CO2 SERPL-SCNC: 26 MMOL/L — SIGNIFICANT CHANGE UP (ref 22–31)
CREAT SERPL-MCNC: 0.87 MG/DL — SIGNIFICANT CHANGE UP (ref 0.5–1.3)
EGFR: 79 ML/MIN/1.73M2 — SIGNIFICANT CHANGE UP
GLUCOSE SERPL-MCNC: 205 MG/DL — HIGH (ref 70–99)
HCG SERPL-ACNC: <1 MIU/ML — SIGNIFICANT CHANGE UP
HCT VFR BLD CALC: 35.8 % — SIGNIFICANT CHANGE UP (ref 34.5–45)
HGB BLD-MCNC: 11.7 G/DL — SIGNIFICANT CHANGE UP (ref 11.5–15.5)
MCHC RBC-ENTMCNC: 29 PG — SIGNIFICANT CHANGE UP (ref 27–34)
MCHC RBC-ENTMCNC: 32.7 GM/DL — SIGNIFICANT CHANGE UP (ref 32–36)
MCV RBC AUTO: 88.6 FL — SIGNIFICANT CHANGE UP (ref 80–100)
NRBC # BLD: 0 /100 WBCS — SIGNIFICANT CHANGE UP (ref 0–0)
NRBC # FLD: 0 K/UL — SIGNIFICANT CHANGE UP (ref 0–0)
PLATELET # BLD AUTO: 505 K/UL — HIGH (ref 150–400)
POTASSIUM SERPL-MCNC: 3.8 MMOL/L — SIGNIFICANT CHANGE UP (ref 3.5–5.3)
POTASSIUM SERPL-SCNC: 3.8 MMOL/L — SIGNIFICANT CHANGE UP (ref 3.5–5.3)
PROT SERPL-MCNC: 7.8 G/DL — SIGNIFICANT CHANGE UP (ref 6–8.3)
RBC # BLD: 4.04 M/UL — SIGNIFICANT CHANGE UP (ref 3.8–5.2)
RBC # FLD: 13.1 % — SIGNIFICANT CHANGE UP (ref 10.3–14.5)
SODIUM SERPL-SCNC: 139 MMOL/L — SIGNIFICANT CHANGE UP (ref 135–145)
WBC # BLD: 9.82 K/UL — SIGNIFICANT CHANGE UP (ref 3.8–10.5)
WBC # FLD AUTO: 9.82 K/UL — SIGNIFICANT CHANGE UP (ref 3.8–10.5)

## 2024-04-11 RX ORDER — DEXTROSE 50 % IN WATER 50 %
15 SYRINGE (ML) INTRAVENOUS ONCE
Refills: 0 | Status: DISCONTINUED | OUTPATIENT
Start: 2024-04-15 | End: 2024-04-16

## 2024-04-11 RX ORDER — DEXTROSE 50 % IN WATER 50 %
25 SYRINGE (ML) INTRAVENOUS ONCE
Refills: 0 | Status: DISCONTINUED | OUTPATIENT
Start: 2024-04-15 | End: 2024-04-16

## 2024-04-11 RX ORDER — GLUCAGON INJECTION, SOLUTION 0.5 MG/.1ML
1 INJECTION, SOLUTION SUBCUTANEOUS ONCE
Refills: 0 | Status: DISCONTINUED | OUTPATIENT
Start: 2024-04-15 | End: 2024-04-16

## 2024-04-11 RX ORDER — DEXTROSE 50 % IN WATER 50 %
12.5 SYRINGE (ML) INTRAVENOUS ONCE
Refills: 0 | Status: DISCONTINUED | OUTPATIENT
Start: 2024-04-15 | End: 2024-04-16

## 2024-04-11 RX ORDER — SODIUM CHLORIDE 9 MG/ML
1000 INJECTION, SOLUTION INTRAVENOUS
Refills: 0 | Status: DISCONTINUED | OUTPATIENT
Start: 2024-04-15 | End: 2024-04-16

## 2024-04-11 NOTE — H&P PST ADULT - PROBLEM SELECTOR PLAN 4
Pt instructed to take montelukast AM of surgery with a sip of water, pt able to verbalize understanding.

## 2024-04-11 NOTE — H&P PST ADULT - LAST CARDIAC ANGIOGRAM/IMAGING
11/2022 The coronary anatomy is normal, severe pulmonary HTN 11/2022 in [facility] - The coronary anatomy is normal, severe pulmonary HTN

## 2024-04-11 NOTE — H&P PST ADULT - NSICDXPASTSURGICALHX_GEN_ALL_CORE_FT
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

## 2024-04-11 NOTE — H&P PST ADULT - PROBLEM SELECTOR PLAN 2
Pt instructed to take lisinopril and carvedilol AM of surgery with a sip of water, pt able to verbalize understanding.  STAS precautions.

## 2024-04-11 NOTE — H&P PST ADULT - NEUROLOGICAL COMMENTS
Spinal cord stimulator for chronic pain.  Ambulates with rollator due to pain Spinal cord stimulator 7/2022 for chronic pain.  Ambulates with rollator due to pain A&Ox4, steady with rollator

## 2024-04-11 NOTE — H&P PST ADULT - PROBLEM SELECTOR PLAN 5
Pt instructed to hold bumetanide on DOS.  Last cardiac note in chart. Pt instructed to hold bumetanide on DOS.  Last cardiac note in chart, 10/2023.

## 2024-04-11 NOTE — H&P PST ADULT - NSICDXPASTMEDICALHX_GEN_ALL_CORE_FT
PAST MEDICAL HISTORY:  Asthma     Cardiac abnormality (pt states hx/o "cardiac event"; is unclear on diagnosis; denies hx/o MI)    Complex regional pain syndrome type 1     Diabetes     DM2 (diabetes mellitus, type 2)     Essential hypertension     Fibroids     Former cigarette smoker (smoked x 45 years; quit ~2013)    History of CHF (congestive heart failure)     HTN (hypertension)     IBS (irritable bowel syndrome)     Intercostal neuralgia     Localized swelling, mass and lump, head     Marijuana smoker, continuous (states smokes 3 - 4 times per day for pain management reasons)    Neuralgia (pt states hx/o "intercostal neuralgia")    Neuropathy     Obesity      PAST MEDICAL HISTORY:  Asthma     Complex regional pain syndrome type 1     Diabetes     DM2 (diabetes mellitus, type 2)     Essential hypertension     Fibroids     Former cigarette smoker (smoked x 45 years; quit ~2013)    History of CHF (congestive heart failure)     HTN (hypertension)     IBS (irritable bowel syndrome)     Intercostal neuralgia     Localized swelling, mass and lump, head     Marijuana smoker, continuous (states smokes 3 - 4 times per day for pain management reasons)    Neuralgia (pt states hx/o "intercostal neuralgia")    Neuropathy     Obesity      PAST MEDICAL HISTORY:  Asthma     Complex regional pain syndrome type 1     Diabetes     DM2 (diabetes mellitus, type 2)     Essential hypertension     Fibroids     Former cigarette smoker (smoked x 45 years; quit ~2013)    History of CHF (congestive heart failure)     HTN (hypertension)     IBS (irritable bowel syndrome)     Idiopathic intracranial hypertension     Intercostal neuralgia     Localized swelling, mass and lump, head     Marijuana smoker, continuous (states smokes 3 - 4 times per day for pain management reasons)    Neuralgia (pt states hx/o "intercostal neuralgia")    Neuropathy     Obesity

## 2024-04-11 NOTE — H&P PST ADULT - BP NONINVASIVE MEAN (MM HG)
Patient called and agreeable to start afinitor.  Had discussed potential side effects with her and given written information about this agent.  Report any new symptoms immediately.   93

## 2024-04-11 NOTE — H&P PST ADULT - HISTORY OF PRESENT ILLNESS
55 yo female with h/o CHF, HTN, HLD, DM2, Idiopathic intracranial hypertension (diagnosed 2020), chronic right sided back pain (intercostal neuralgia/complex regional pain syndrome) s/p spinal stimulator p/w poorly controlled back pain. Pt reports right cheek mass x 2 years, s/p MRI and CT showing that it is enlarging.  Pt reports discomfort on palpation and while eating.  Pt is scheduled for mass removal of right cheek face.     Pt recently hospitalized 3/19/24-3/26/24 for poorly controlled back pain and parainfluenza with hypoxemia - treated with O2, IV abx and pain medications.  Pt followed up with PCP 4/5/24.

## 2024-04-11 NOTE — H&P PST ADULT - PROBLEM SELECTOR PLAN 3
Pt instructed to hold Novolog on DOS.  Pt instructed to take 35 units of Tresiba on 4/14/PM.  Pt instructed to hold metformin on 4/15/24.

## 2024-04-11 NOTE — H&P PST ADULT - NSANTHOSAYNRD_GEN_A_CORE
No. STAS screening performed.  STOP BANG Legend: 0-2 = LOW Risk; 3-4 = INTERMEDIATE Risk; 5-8 = HIGH Risk

## 2024-04-11 NOTE — H&P PST ADULT - MUSCULOSKELETAL COMMENTS
chronic right sided back pain for since 2016, has been on oxycodone for about 11 months steady with rollator, HERNAN

## 2024-04-11 NOTE — H&P PST ADULT - PROBLEM SELECTOR PLAN 1
Pt is scheduled for mass removal of right cheek face on 4/15/24.  Verbal and written pre op instructions reviewed with patient and pt able to verbalize understanding.    Sterile cup given, pt instructed to bring urine sample AM of surgery for UCG and pt able to verbalize understanding.   Pt instructed to take Protonix AM of surgery with a sip of water, pt able to verbalize understanding.   Pt was instructed to obtain medical eval preop, per pt she was seen on 4/5/24, PST requested - recent hospitalization. Pt is scheduled for mass removal of right cheek face on 4/15/24.  Verbal and written pre op instructions reviewed with patient and pt able to verbalize understanding.    Sterile cup given, pt instructed to bring urine sample AM of surgery for UCG and pt able to verbalize understanding.   Pt instructed to take Protonix AM of surgery with a sip of water, pt able to verbalize understanding.   Pt was instructed to obtain medical eval preop, per pt she was seen on 4/5/24, PST requested - recent hospitalization.  CBC CMP HCG drawn and sent.   HA1c from 2/2024 in chart from [facility].

## 2024-04-11 NOTE — H&P PST ADULT - NS PRO FEM  PAP SMEARS 3YRS
Assessment /Plan     For exam results, see Encounter Report.    Post-operative state    Slit lamp exam:  L/L: nl  K: clear, wound sealed  AC: trace cell  Iris/Lens: IOL centered and stable    POW1 s/p phaco: Surgery healing well with no signs of infection or abnormal inflammation.    Vit heme resolved and healing well..                  yes

## 2024-04-11 NOTE — H&P PST ADULT - RESPIRATORY AND THORAX COMMENTS
refer to HPI, recently treated [facility] for hypoxemia and parainfluenza, pt reports to be fully recovered and denies resp symptoms refer to HPI, recently treated [facility] for hypoxemia and parainfluenza, pt reports to be fully recovered and denies resp symptoms.  Pt had asa pna? s/p spinal cord stimulator 7/2022, intubated for about 1 week.

## 2024-04-11 NOTE — H&P PST ADULT - PROBLEM SELECTOR PLAN 6
Pt has spinal cord stimulator - pt instructed to bring control on DOS, pt able to verbalize understanding.  Chronic pain team notified of pt's chronic use of oxycodone.

## 2024-04-11 NOTE — H&P PST ADULT - LAST ECHOCARDIOGRAM
TTE 3/2024  Left ventricular systolic function is normal with an ejection fraction of 65 % TTE 3/2024  Left ventricular systolic function is normal with an ejection fraction of 65 %. Mildly elevated pulmonary artery systolic pressure TTE 3/2024 in [facility] Left ventricular systolic function is normal with an ejection fraction of 65 %. Mildly elevated pulmonary artery systolic pressure

## 2024-04-11 NOTE — H&P PST ADULT - NEGATIVE GASTROINTESTINAL SYMPTOMS
9996 verbal SBAR received from Cornelius Sharpe, NEREIDA. Patient care assumed at this time. 7209 Removed from [facility] at this time so patient can ambulate in room and \"freshen up\". 9594 RN at bedside for morning assessment. No complaints. Encouraged patient to order breakfast and will do NST after patient has eaten. Discussed plan of care with patient. 8429 Tech at bedside to complete ECHO. 0830 Dr. Jeremiah Phoenix at bedside for evaluation. Pending ECHO report, patient may be discharged home.     0906 Placed on EFM at this time for NST.     7360 Removed from [facility] after reactive NST. NST verified by VASHTI Fields RN. Waiting on ECHO results for discharge. [id number] Cardiology states ECHO normal. Patient may be discharged home. Will notify Dr. Jeremiah Phoenix. 56 Updated Dr. Jeremiah Phoenix on cardiology \"okaying\" patient to go home. PT at bedside now teaching patient how to put on compression \"jobst stockings\". Once PT is finished, patient may be discharged home. 1056 Removed IV and discussed discharge teaching with patient. States understanding of all teaching and knows to follow up with Dr. Jeremiah Phoenix and cardiology. Signed copy of discharge instructions placed on hard chart. Discharged home. no nausea/no vomiting/no constipation/no abdominal pain

## 2024-04-12 NOTE — ASU PATIENT PROFILE, ADULT - NSICDXPASTMEDICALHX_GEN_ALL_CORE_FT
PAST MEDICAL HISTORY:  Asthma     Complex regional pain syndrome type 1     Diabetes     DM2 (diabetes mellitus, type 2)     Essential hypertension     Fibroids     Former cigarette smoker (smoked x 45 years; quit ~2013)    History of CHF (congestive heart failure)     HTN (hypertension)     IBS (irritable bowel syndrome)     Idiopathic intracranial hypertension     Intercostal neuralgia     Localized swelling, mass and lump, head     Marijuana smoker, continuous (states smokes 3 - 4 times per day for pain management reasons)    Neuralgia (pt states hx/o "intercostal neuralgia")    Neuropathy     Obesity

## 2024-04-12 NOTE — ASU PATIENT PROFILE, ADULT - FALL HARM RISK - HARM RISK INTERVENTIONS

## 2024-04-14 ENCOUNTER — TRANSCRIPTION ENCOUNTER (OUTPATIENT)
Age: 54
End: 2024-04-14

## 2024-04-15 ENCOUNTER — APPOINTMENT (OUTPATIENT)
Dept: PLASTIC SURGERY | Facility: HOSPITAL | Age: 54
End: 2024-04-15
Payer: MEDICARE

## 2024-04-15 ENCOUNTER — INPATIENT (INPATIENT)
Facility: HOSPITAL | Age: 54
LOS: 0 days | Discharge: HOME | End: 2024-04-16
Attending: SURGERY | Admitting: SURGERY
Payer: MEDICARE

## 2024-04-15 ENCOUNTER — RESULT REVIEW (OUTPATIENT)
Age: 54
End: 2024-04-15

## 2024-04-15 VITALS
HEIGHT: 68 IN | SYSTOLIC BLOOD PRESSURE: 124 MMHG | OXYGEN SATURATION: 95 % | RESPIRATION RATE: 16 BRPM | HEART RATE: 71 BPM | DIASTOLIC BLOOD PRESSURE: 56 MMHG | TEMPERATURE: 98 F | WEIGHT: 293 LBS

## 2024-04-15 DIAGNOSIS — Z98.890 OTHER SPECIFIED POSTPROCEDURAL STATES: Chronic | ICD-10-CM

## 2024-04-15 DIAGNOSIS — Z90.49 ACQUIRED ABSENCE OF OTHER SPECIFIED PARTS OF DIGESTIVE TRACT: Chronic | ICD-10-CM

## 2024-04-15 DIAGNOSIS — R22.0 LOCALIZED SWELLING, MASS AND LUMP, HEAD: ICD-10-CM

## 2024-04-15 DIAGNOSIS — Z96.82 PRESENCE OF NEUROSTIMULATOR: Chronic | ICD-10-CM

## 2024-04-15 LAB
GLUCOSE BLDC GLUCOMTR-MCNC: 138 MG/DL — HIGH (ref 70–99)
GLUCOSE BLDC GLUCOMTR-MCNC: 155 MG/DL — HIGH (ref 70–99)
GLUCOSE BLDC GLUCOMTR-MCNC: 177 MG/DL — HIGH (ref 70–99)
GLUCOSE BLDC GLUCOMTR-MCNC: 227 MG/DL — HIGH (ref 70–99)
HCG UR QL: NEGATIVE — SIGNIFICANT CHANGE UP

## 2024-04-15 PROCEDURE — 21012 EXC FACE LES SBQ 2 CM/>: CPT

## 2024-04-15 PROCEDURE — 13132 CMPLX RPR F/C/C/M/N/AX/G/H/F: CPT | Mod: 59

## 2024-04-15 PROCEDURE — 88305 TISSUE EXAM BY PATHOLOGIST: CPT | Mod: 26

## 2024-04-15 RX ORDER — INSULIN LISPRO 100/ML
VIAL (ML) SUBCUTANEOUS
Refills: 0 | Status: DISCONTINUED | OUTPATIENT
Start: 2024-04-15 | End: 2024-04-16

## 2024-04-15 RX ORDER — CARVEDILOL PHOSPHATE 80 MG/1
3.12 CAPSULE, EXTENDED RELEASE ORAL EVERY 12 HOURS
Refills: 0 | Status: DISCONTINUED | OUTPATIENT
Start: 2024-04-15 | End: 2024-04-16

## 2024-04-15 RX ORDER — INSULIN LISPRO 100/ML
VIAL (ML) SUBCUTANEOUS AT BEDTIME
Refills: 0 | Status: DISCONTINUED | OUTPATIENT
Start: 2024-04-15 | End: 2024-04-16

## 2024-04-15 RX ORDER — HYDROMORPHONE HYDROCHLORIDE 2 MG/ML
1 INJECTION INTRAMUSCULAR; INTRAVENOUS; SUBCUTANEOUS
Refills: 0 | Status: DISCONTINUED | OUTPATIENT
Start: 2024-04-15 | End: 2024-04-15

## 2024-04-15 RX ORDER — CEFAZOLIN SODIUM 1 G
3000 VIAL (EA) INJECTION EVERY 8 HOURS
Refills: 0 | Status: DISCONTINUED | OUTPATIENT
Start: 2024-04-15 | End: 2024-04-16

## 2024-04-15 RX ORDER — SODIUM CHLORIDE 9 MG/ML
1000 INJECTION, SOLUTION INTRAVENOUS
Refills: 0 | Status: DISCONTINUED | OUTPATIENT
Start: 2024-04-15 | End: 2024-04-16

## 2024-04-15 RX ORDER — ONDANSETRON 8 MG/1
4 TABLET, FILM COATED ORAL EVERY 6 HOURS
Refills: 0 | Status: DISCONTINUED | OUTPATIENT
Start: 2024-04-15 | End: 2024-04-16

## 2024-04-15 RX ORDER — LANOLIN ALCOHOL/MO/W.PET/CERES
5 CREAM (GRAM) TOPICAL AT BEDTIME
Refills: 0 | Status: DISCONTINUED | OUTPATIENT
Start: 2024-04-15 | End: 2024-04-15

## 2024-04-15 RX ORDER — OXYCODONE HYDROCHLORIDE 5 MG/1
10 TABLET ORAL EVERY 6 HOURS
Refills: 0 | Status: DISCONTINUED | OUTPATIENT
Start: 2024-04-15 | End: 2024-04-16

## 2024-04-15 RX ORDER — LANOLIN ALCOHOL/MO/W.PET/CERES
6 CREAM (GRAM) TOPICAL AT BEDTIME
Refills: 0 | Status: DISCONTINUED | OUTPATIENT
Start: 2024-04-15 | End: 2024-04-16

## 2024-04-15 RX ORDER — CEFAZOLIN SODIUM 1 G
1000 VIAL (EA) INJECTION EVERY 8 HOURS
Refills: 0 | Status: DISCONTINUED | OUTPATIENT
Start: 2024-04-15 | End: 2024-04-15

## 2024-04-15 RX ORDER — ONDANSETRON 8 MG/1
4 TABLET, FILM COATED ORAL ONCE
Refills: 0 | Status: DISCONTINUED | OUTPATIENT
Start: 2024-04-15 | End: 2024-04-15

## 2024-04-15 RX ORDER — DEXTROSE 10 % IN WATER 10 %
125 INTRAVENOUS SOLUTION INTRAVENOUS ONCE
Refills: 0 | Status: DISCONTINUED | OUTPATIENT
Start: 2024-04-15 | End: 2024-04-16

## 2024-04-15 RX ORDER — OXYCODONE HYDROCHLORIDE 5 MG/1
5 TABLET ORAL EVERY 4 HOURS
Refills: 0 | Status: DISCONTINUED | OUTPATIENT
Start: 2024-04-15 | End: 2024-04-16

## 2024-04-15 RX ORDER — CHLORHEXIDINE GLUCONATE 213 G/1000ML
15 SOLUTION TOPICAL
Refills: 0 | Status: DISCONTINUED | OUTPATIENT
Start: 2024-04-15 | End: 2024-04-16

## 2024-04-15 RX ORDER — ACETAMINOPHEN 500 MG
650 TABLET ORAL EVERY 6 HOURS
Refills: 0 | Status: DISCONTINUED | OUTPATIENT
Start: 2024-04-15 | End: 2024-04-16

## 2024-04-15 RX ORDER — HYDROMORPHONE HYDROCHLORIDE 2 MG/ML
0.5 INJECTION INTRAMUSCULAR; INTRAVENOUS; SUBCUTANEOUS
Refills: 0 | Status: DISCONTINUED | OUTPATIENT
Start: 2024-04-15 | End: 2024-04-15

## 2024-04-15 RX ADMIN — OXYCODONE HYDROCHLORIDE 5 MILLIGRAM(S): 5 TABLET ORAL at 23:21

## 2024-04-15 RX ADMIN — HYDROMORPHONE HYDROCHLORIDE 1 MILLIGRAM(S): 2 INJECTION INTRAMUSCULAR; INTRAVENOUS; SUBCUTANEOUS at 18:26

## 2024-04-15 RX ADMIN — OXYCODONE HYDROCHLORIDE 10 MILLIGRAM(S): 5 TABLET ORAL at 19:30

## 2024-04-15 RX ADMIN — Medication 650 MILLIGRAM(S): at 23:29

## 2024-04-15 RX ADMIN — OXYCODONE HYDROCHLORIDE 5 MILLIGRAM(S): 5 TABLET ORAL at 22:21

## 2024-04-15 RX ADMIN — OXYCODONE HYDROCHLORIDE 10 MILLIGRAM(S): 5 TABLET ORAL at 18:48

## 2024-04-15 RX ADMIN — Medication 6 MILLIGRAM(S): at 22:21

## 2024-04-15 RX ADMIN — Medication 650 MILLIGRAM(S): at 18:26

## 2024-04-15 RX ADMIN — HYDROMORPHONE HYDROCHLORIDE 1 MILLIGRAM(S): 2 INJECTION INTRAMUSCULAR; INTRAVENOUS; SUBCUTANEOUS at 17:40

## 2024-04-15 RX ADMIN — Medication 100 MILLIGRAM(S): at 22:22

## 2024-04-15 RX ADMIN — CARVEDILOL PHOSPHATE 3.12 MILLIGRAM(S): 80 CAPSULE, EXTENDED RELEASE ORAL at 18:49

## 2024-04-15 RX ADMIN — SODIUM CHLORIDE 125 MILLILITER(S): 9 INJECTION, SOLUTION INTRAVENOUS at 22:22

## 2024-04-15 RX ADMIN — Medication 650 MILLIGRAM(S): at 18:21

## 2024-04-15 RX ADMIN — Medication 100 MILLIGRAM(S): at 22:21

## 2024-04-15 NOTE — ASU PREOP CHECKLIST - 1.
lidocaine patches x 2 lower back applied by patient;  patient states having spinal stimulator internal and IUD  ;

## 2024-04-15 NOTE — ASU PREOP CHECKLIST - LAST DOSE WITHIN LAST 24HRS
Bedside and Verbal shift change report given to Carol Rangel RN (oncoming nurse) by Leticia Mendoza RN (offgoing nurse). Report included the following information SBAR, Kardex, Intake/Output, MAR, Accordion and Recent Results. Yes

## 2024-04-15 NOTE — PATIENT PROFILE ADULT - FALL HARM RISK - HARM RISK INTERVENTIONS
Assistance with ambulation/Assistance OOB with selected safe patient handling equipment/Communicate Risk of Fall with Harm to all staff/Discuss with provider need for PT consult/Monitor gait and stability/Provide patient with walking aids - walker, cane, crutches/Reinforce activity limits and safety measures with patient and family/Sit up slowly, dangle for a short time, stand at bedside before walking/Tailored Fall Risk Interventions/Use of alarms - bed, chair and/or voice tab/Visual Cue: Yellow wristband and red socks/Bed in lowest position, wheels locked, appropriate side rails in place/Call bell, personal items and telephone in reach/Instruct patient to call for assistance before getting out of bed or chair/Non-slip footwear when patient is out of bed/[place] to call system/Physically safe environment - no spills, clutter or unnecessary equipment/Purposeful Proactive Rounding/Room/bathroom lighting operational, light cord in reach

## 2024-04-15 NOTE — ASU PREOP CHECKLIST - COMMENTS
bp meds  , protonix , pregabalin taken at  9:30am  , lidocaine patches x 2 lower back applied by patient

## 2024-04-15 NOTE — ASU PREOP CHECKLIST - DNR CLARIFICATION FORM COMPLETED
· Subjective and Objective: 	  INTERVAL HPI/OVERNIGHT EVENTS: Patient seen at [facility],awake,omn ventilator ,hemodinamicly stable  Antimicrobial:  cefTRIAXone   IVPB 1 Gram(s) IV Intermittent every 24 hours    Cardiovascular:  tamsulosin 0.4 milliGRAM(s) Oral at bedtime  metoprolol 75 milliGRAM(s) Oral two times a day  nitroglycerin  Infusion 10 MICROgram(s)/Min IV Continuous <Continuous>  NIFEdipine XL 60 milliGRAM(s) Oral daily    Pulmonary:  buDESOnide  80 MICROgram(s)/formoterol 4.5 MICROgram(s) Inhaler 2 Puff(s) Inhalation two times a day    Hematalogic:  aspirin  chewable 81 milliGRAM(s) Oral daily  clopidogrel Tablet 75 milliGRAM(s) Oral daily  heparin  Infusion. 900 Unit(s)/Hr IV Continuous <Continuous>    Other:  HYDROmorphone  Injectable 0.5 milliGRAM(s) IV Push every 3 hours PRN  pantoprazole    Tablet 40 milliGRAM(s) Oral before breakfast  atorvastatin 80 milliGRAM(s) Oral at bedtime  insulin lispro (HumaLOG) corrective regimen sliding scale   SubCutaneous Before meals and at bedtime    HYDROmorphone  Injectable 0.5 milliGRAM(s) IV Push every 3 hours PRN  aspirin  chewable 81 milliGRAM(s) Oral daily  cefTRIAXone   IVPB 1 Gram(s) IV Intermittent every 24 hours  buDESOnide  80 MICROgram(s)/formoterol 4.5 MICROgram(s) Inhaler 2 Puff(s) Inhalation two times a day  tamsulosin 0.4 milliGRAM(s) Oral at bedtime  pantoprazole    Tablet 40 milliGRAM(s) Oral before breakfast  atorvastatin 80 milliGRAM(s) Oral at bedtime  insulin lispro (HumaLOG) corrective regimen sliding scale   SubCutaneous Before meals and at bedtime  clopidogrel Tablet 75 milliGRAM(s) Oral daily  heparin  Infusion. 900 Unit(s)/Hr IV Continuous <Continuous>  metoprolol 75 milliGRAM(s) Oral two times a day  nitroglycerin  Infusion 10 MICROgram(s)/Min IV Continuous <Continuous>  NIFEdipine XL 60 milliGRAM(s) Oral daily    Drug Dosing Weight  Height (cm): 172.72 (06 Sep 2017 21:30)  Weight (kg): 88.7 (06 Sep 2017 21:30)  BMI (kg/m2): 29.7 (06 Sep 2017 21:30)  BSA (m2): 2.02 (06 Sep 2017 21:30)    CENTRAL LINE: [ ] YES [x ] NO  LOCATION:   DATE INSERTED:  REMOVE: [ ] YES [ ] NO  EXPLAIN:    JAMES: [ ] YES [x ] NO    DATE INSERTED:  REMOVE:  [ ] YES [ ] NO  EXPLAIN:    A-LINE:  [ ] YES [x ] NO  LOCATION:   DATE INSERTED:  REMOVE:  [ ] YES [ ] NO  EXPLAIN:    PMH -reviewed admission note, no change since admission    ICU Vital Signs Last 24 Hrs  T(C): 37.4 (10 Sep 2017 00:09), Max: 37.4 (10 Sep 2017 00:09)  T(F): 99.4 (10 Sep 2017 00:09), Max: 99.4 (10 Sep 2017 00:09)  HR: 113 (10 Sep 2017 01:00) (84 - 117)  BP: 143/112 (10 Sep 2017 01:00) (128/75 - 161/81)  BP(mean): 120 (10 Sep 2017 01:00) (81 - 120)  ABP: --  ABP(mean): --  RR: 26 (10 Sep 2017 01:00) (17 - 30)  SpO2: 89% (10 Sep 2017 01:00) (81% - 95%)      ABG - ( 08 Sep 2017 04:51 )  pH: 7.37  /  pCO2: 36    /  pO2: 116   / HCO3: 21    / Base Excess: -3.6  /  SaO2: 98              PHYSICAL EXAM:    GENERAL: [x ]NAD, [x ]well-groomed, [x ]well-developed  HEAD:  [ x]Atraumatic, [ x]Normocephalic  EYES: [x ]EOMI, [x ]PERRLA, [ ]conjunctiva and sclera clear  ENMT: [x ]No tonsillar erythema, exudates, or enlargement; [ ]Moist mucous membranes, [ ]Good dentition, [ ]No lesions  NECK: [ x]Supple, normal appearance, [ ]No JVD; [ ]Normal thyroid; [ ]Trachea midline  NERVOUS SYSTEM:  [x ]Alert & Oriented X3, [x ]Good concentration; [ ]Motor Strength 5/5 B/L upper and lower extremities; [ ]DTRs 2+ intact and symmetric  CHEST/LUNG: [x ]No chest deformity; [ ]Normal percussion bilaterally; [ ]No rales, rhonchi, wheezing   HEART: [x ]Regular rate and rhythm; [ ]No murmurs, rubs, or gallops  ABDOMEN: [x ]Soft, Nontender, Nondistended; [ ]Bowel sounds present  EXTREMITIES:  [ x]2+ Peripheral Pulses, [ ]No clubbing, cyanosis, or edema  LYMPH: [x ]No lymphadenopathy noted  SKIN: [ x]No rashes or lesions; [ ]Good capillary refill          LABS:    Labs:                        12.0   12.9  )-----------( 226      ( 10 Sep 2017 05:12 )             36.6     09-10    133<L>  |  100  |  90<H>  ----------------------------<  180<H>  4.4   |  17<L>  |  8.23<H>    Ca    9.0      10 Sep 2017 05:12  Phos  6.3     -10  Mg     2.4     09-10    TPro  7.6  /  Alb  2.8<L>  /  TBili  0.6  /  DBili  x   /  AST  26  /  ALT  11  /  AlkPhos  75  -10    09-07 Chol 171  HDL 36<L> Trig 153<H>                 Color: Yellow / Appearance: Clear / S.020 / pH: x  Gluc: x / Ketone: Trace  / Bili: Negative / Urobili: Negative   Blood: x / Protein: 100 / Nitrite: Negative   Leuk Esterase: Small / RBC: >50 /HPF / WBC 6-10 /HPF   Sq Epi: x / Non Sq Epi: Few / Bacteria: Moderate /HPF          RADIOLOGY & ADDITIONAL STUDIES REVIEWED:  ***    [ ]GOALS OF CARE DISCUSSION WITH PATIENT/FAMILY/PROXY:    CRITICAL CARE TIME SPENT: 35 minutes   INTERVAL HPI/OVERNIGHT EVENTS: Overnight pt continued to have episodic CP, and was agitated.  Nitro drip was restarted.       Antimicrobial:  cefTRIAXone   IVPB 1 Gram(s) IV Intermittent every 24 hours    Cardiovascular:  tamsulosin 0.4 milliGRAM(s) Oral at bedtime  metoprolol 75 milliGRAM(s) Oral two times a day  nitroglycerin  Infusion 10 MICROgram(s)/Min IV Continuous <Continuous>  NIFEdipine XL 60 milliGRAM(s) Oral daily    Pulmonary:  buDESOnide  80 MICROgram(s)/formoterol 4.5 MICROgram(s) Inhaler 2 Puff(s) Inhalation two times a day    Hematalogic:  aspirin  chewable 81 milliGRAM(s) Oral daily  clopidogrel Tablet 75 milliGRAM(s) Oral daily  heparin  Infusion. 900 Unit(s)/Hr IV Continuous <Continuous>    Other:  HYDROmorphone  Injectable 0.5 milliGRAM(s) IV Push every 3 hours PRN  pantoprazole    Tablet 40 milliGRAM(s) Oral before breakfast  atorvastatin 80 milliGRAM(s) Oral at bedtime  insulin lispro (HumaLOG) corrective regimen sliding scale   SubCutaneous Before meals and at bedtime    HYDROmorphone  Injectable 0.5 milliGRAM(s) IV Push every 3 hours PRN  aspirin  chewable 81 milliGRAM(s) Oral daily  cefTRIAXone   IVPB 1 Gram(s) IV Intermittent every 24 hours  buDESOnide  80 MICROgram(s)/formoterol 4.5 MICROgram(s) Inhaler 2 Puff(s) Inhalation two times a day  tamsulosin 0.4 milliGRAM(s) Oral at bedtime  pantoprazole    Tablet 40 milliGRAM(s) Oral before breakfast  atorvastatin 80 milliGRAM(s) Oral at bedtime  insulin lispro (HumaLOG) corrective regimen sliding scale   SubCutaneous Before meals and at bedtime  clopidogrel Tablet 75 milliGRAM(s) Oral daily  heparin  Infusion. 900 Unit(s)/Hr IV Continuous <Continuous>  metoprolol 75 milliGRAM(s) Oral two times a day  nitroglycerin  Infusion 10 MICROgram(s)/Min IV Continuous <Continuous>  NIFEdipine XL 60 milliGRAM(s) Oral daily    Drug Dosing Weight  Height (cm): 172.72 (06 Sep 2017 21:30)  Weight (kg): 88.7 (06 Sep 2017 21:30)  BMI (kg/m2): 29.7 (06 Sep 2017 21:30)  BSA (m2): 2.02 (06 Sep 2017 21:30)    CENTRAL LINE: [ ] YES [x ] NO  LOCATION:   DATE INSERTED:  REMOVE: [ ] YES [ ] NO  EXPLAIN:    JAMES: [ ] YES [x ] NO    DATE INSERTED:  REMOVE:  [ ] YES [ ] NO  EXPLAIN:    A-LINE:  [ ] YES [x ] NO  LOCATION:   DATE INSERTED:  REMOVE:  [ ] YES [ ] NO  EXPLAIN:    PMH -reviewed admission note, no change since admission    ICU Vital Signs Last 24 Hrs  T(C): 37.4 (10 Sep 2017 00:09), Max: 37.4 (10 Sep 2017 00:09)  T(F): 99.4 (10 Sep 2017 00:09), Max: 99.4 (10 Sep 2017 00:09)  HR: 113 (10 Sep 2017 01:) (84 - 117)  BP: 143/112 (10 Sep 2017 01:00) (128/75 - 161/81)  BP(mean): 120 (10 Sep 2017 01:) (81 - 120)  ABP: --  ABP(mean): --  RR: 26 (10 Sep 2017 01:) (17 - 30)  SpO2: 89% (10 Sep 2017 01:) (81% - 95%)      ABG - ( 08 Sep 2017 04:51 )  pH: 7.37  /  pCO2: 36    /  pO2: 116   / HCO3: 21    / Base Excess: -3.6  /  SaO2: 98              PHYSICAL EXAM:    GENERAL: [x ]NAD, [x ]well-groomed, [x ]well-developed  HEAD:  [ x]Atraumatic, [ x]Normocephalic  EYES: [x ]EOMI, [x ]PERRLA, [ ]conjunctiva and sclera clear  ENMT: [x ]No tonsillar erythema, exudates, or enlargement; [ ]Moist mucous membranes, [ ]Good dentition, [ ]No lesions  NECK: [ x]Supple, normal appearance, [ ]No JVD; [ ]Normal thyroid; [ ]Trachea midline  NERVOUS SYSTEM:  [x ]Alert & Oriented X3, [x ]Good concentration; [ ]Motor Strength 5/5 B/L upper and lower extremities; [ ]DTRs 2+ intact and symmetric  CHEST/LUNG: [x ]No chest deformity; [ ]Normal percussion bilaterally; [ ]No rales, rhonchi, wheezing   HEART: [x ]Regular rate and rhythm; [ ]No murmurs, rubs, or gallops  ABDOMEN: [x ]Soft, Nontender, Nondistended; [ ]Bowel sounds present  EXTREMITIES:  [ x]2+ Peripheral Pulses, [ ]No clubbing, cyanosis, or edema  LYMPH: [x ]No lymphadenopathy noted  SKIN: [ x]No rashes or lesions; [ ]Good capillary refill          LABS:                   Color: Yellow / Appearance: Clear / S.020 / pH: x  Gluc: x / Ketone: Trace  / Bili: Negative / Urobili: Negative   Blood: x / Protein: 100 / Nitrite: Negative   Leuk Esterase: Small / RBC: >50 /HPF / WBC 6-10 /HPF   Sq Epi: x / Non Sq Epi: Few / Bacteria: Moderate /HPF          RADIOLOGY & ADDITIONAL STUDIES REVIEWED:  ***    [ ]GOALS OF CARE DISCUSSION WITH PATIENT/FAMILY/PROXY:    CRITICAL CARE TIME SPENT: 35 minutes     Assessment and Plan:   · Assessment		  69yo male, Indonesian speaking, wife is current patient in ICU, daughter at bedside to [facility] [place], [facility] CAD s/p 12-14 stents (placed [phone]), DM, HTN, HLD, CVA (4 yrs ago, residual left sided facial weakness), lumbar radiculopathy with herniated disc, COPD (never smoker, passive smoke exposure), CKD (last creatinine 2 months ago was 1.2), bipolar disorder (no meds, sees psychiatrist), chronic kidney stones (has had ureteral stent in past) p/w 2 days intractable right flank pain radiating to groin, chills, vomiting 2/2 kidney stones/UTI, rule out pyelonephritis/obstruction.  Patient with NSTEMI with unstable angina and TWI lateral leads     Problem/Plan - 1:  ·  Problem: NSTEMI (non-ST elevated myocardial infarction)./Respiratory failure-kev be placed on ventilator  -stable,cont current care,weanning from ventilator  not as candidate for cath given lin on ckd  - trend troponin: last troponin 1.8->1.5.   - TTE with 35% EF and G2DD  - ASA, statin, BB, heparin gtt  - cardiology consult -Dr Briseno.      Problem/Plan - 2:  ·  Problem: Hypertensive emergency.  Plan: SBP in 140/150, responded to labetalol and lopressor IV-improverd clinically  - NGT drip was restated due to patient recurrent chest pain. SBP in 140/150, responded to labetalol and lopressor IV  - NGT drip for 3 hour and switched to procardia and metoprolol.      Problem/Plan - 3:  ·  Problem: LIN (acute kidney injury).  Plan: LIN on CKD, -s/p hd yesterday  CT abdomen/pelvis: right hydronephrosis, right ureteral stone; bilateral renal calculi, left renal atrophy.  LIN likely 2/2 post-obstructive nephropathy.    - urology consult tonight - Dr Irvni consulted. LIN on CKD, last creatinine 2.1 in 2016  CT abdomen/pelvis: right hydronephrosis, right ureteral stone; bilateral renal calculi, left renal atrophy.  LIN likely 2/2 post-obstructive nephropathy.  patient refused james  - urology consult ayala - Dr Irvin consulted      Problem/Plan - 4:  ·  Problem: Pyelonephritis.  Plan: fever, chills, vomiting, perinephric stranding on CT scan, + R CVAT  UA positive nitrite/LE/WBC  - ceftriaxone day 5  - follow up UCx and Bcx- negative up to date. fever, chills, vomiting, perinephric stranding on CT scan, + R CVAT  UA positive nitrite/LE/WBC  - ceftriaxone   - follow up UCx and Bcx      Problem/Plan - 5:  ·  Problem: Type 2 diabetes mellitus with diabetic chronic kidney disease, unspecified CKD stage, unspecified long term insulin use status.  Plan: BSL controlled  - hold oral hypoglycemics  - diabetic diet, HSS, BSL controlled  - hold oral hypoglycemics  - diabetic diet, HSS, follow up A1C      Problem/Plan - 6:  Problem: Need for prophylactic measure. Plan: improve score = 2, on heparin gtt  GI ppx.    7.FULL CODE  D/W family abnd agrred with plan of treatment n/a

## 2024-04-16 ENCOUNTER — NON-APPOINTMENT (OUTPATIENT)
Age: 54
End: 2024-04-16

## 2024-04-16 ENCOUNTER — TRANSCRIPTION ENCOUNTER (OUTPATIENT)
Age: 54
End: 2024-04-16

## 2024-04-16 VITALS
TEMPERATURE: 98 F | HEART RATE: 72 BPM | DIASTOLIC BLOOD PRESSURE: 60 MMHG | SYSTOLIC BLOOD PRESSURE: 124 MMHG | RESPIRATION RATE: 17 BRPM | OXYGEN SATURATION: 96 %

## 2024-04-16 LAB
GLUCOSE BLDC GLUCOMTR-MCNC: 170 MG/DL — HIGH (ref 70–99)
GLUCOSE BLDC GLUCOMTR-MCNC: 206 MG/DL — HIGH (ref 70–99)
GLUCOSE BLDC GLUCOMTR-MCNC: 254 MG/DL — HIGH (ref 70–99)

## 2024-04-16 RX ORDER — OXYCODONE HYDROCHLORIDE 5 MG/1
1 TABLET ORAL
Qty: 10 | Refills: 0
Start: 2024-04-16 | End: 2024-04-17

## 2024-04-16 RX ORDER — CHLORHEXIDINE GLUCONATE 213 G/1000ML
15 SOLUTION TOPICAL
Qty: 1 | Refills: 0
Start: 2024-04-16 | End: 2024-04-22

## 2024-04-16 RX ORDER — ACETAMINOPHEN 500 MG
2 TABLET ORAL
Qty: 0 | Refills: 0 | DISCHARGE
Start: 2024-04-16

## 2024-04-16 RX ADMIN — Medication 2: at 17:21

## 2024-04-16 RX ADMIN — OXYCODONE HYDROCHLORIDE 10 MILLIGRAM(S): 5 TABLET ORAL at 12:54

## 2024-04-16 RX ADMIN — OXYCODONE HYDROCHLORIDE 10 MILLIGRAM(S): 5 TABLET ORAL at 06:28

## 2024-04-16 RX ADMIN — OXYCODONE HYDROCHLORIDE 10 MILLIGRAM(S): 5 TABLET ORAL at 19:57

## 2024-04-16 RX ADMIN — Medication 650 MILLIGRAM(S): at 13:26

## 2024-04-16 RX ADMIN — Medication 1 TABLET(S): at 18:26

## 2024-04-16 RX ADMIN — CARVEDILOL PHOSPHATE 3.12 MILLIGRAM(S): 80 CAPSULE, EXTENDED RELEASE ORAL at 06:34

## 2024-04-16 RX ADMIN — Medication 650 MILLIGRAM(S): at 18:17

## 2024-04-16 RX ADMIN — Medication 100 MILLIGRAM(S): at 06:28

## 2024-04-16 RX ADMIN — OXYCODONE HYDROCHLORIDE 10 MILLIGRAM(S): 5 TABLET ORAL at 13:45

## 2024-04-16 RX ADMIN — Medication 650 MILLIGRAM(S): at 06:28

## 2024-04-16 RX ADMIN — Medication 650 MILLIGRAM(S): at 18:58

## 2024-04-16 RX ADMIN — Medication 650 MILLIGRAM(S): at 12:26

## 2024-04-16 RX ADMIN — OXYCODONE HYDROCHLORIDE 10 MILLIGRAM(S): 5 TABLET ORAL at 07:28

## 2024-04-16 RX ADMIN — OXYCODONE HYDROCHLORIDE 5 MILLIGRAM(S): 5 TABLET ORAL at 09:46

## 2024-04-16 RX ADMIN — OXYCODONE HYDROCHLORIDE 5 MILLIGRAM(S): 5 TABLET ORAL at 10:40

## 2024-04-16 RX ADMIN — Medication 6: at 08:09

## 2024-04-16 RX ADMIN — CHLORHEXIDINE GLUCONATE 15 MILLILITER(S): 213 SOLUTION TOPICAL at 06:28

## 2024-04-16 RX ADMIN — Medication 650 MILLIGRAM(S): at 07:28

## 2024-04-16 RX ADMIN — CHLORHEXIDINE GLUCONATE 15 MILLILITER(S): 213 SOLUTION TOPICAL at 17:42

## 2024-04-16 RX ADMIN — Medication 650 MILLIGRAM(S): at 00:29

## 2024-04-16 RX ADMIN — Medication 4: at 12:25

## 2024-04-16 NOTE — DISCHARGE NOTE NURSING/CASE MANAGEMENT/SOCIAL WORK - NSDCVIVACCINE_GEN_ALL_CORE_FT
influenza, injectable, quadrivalent, preservative free; 04-Oct-2022 15:17; Elissa Cano (RN); [employer]; [id number] (Exp. Date: 30-Jun-2023); IntraMuscular; Deltoid Left.; 0.5 milliLiter(s); VIS (VIS Published: 06-Aug-2021, VIS Presented: 04-Oct-2022);

## 2024-04-16 NOTE — DISCHARGE NOTE PROVIDER - CARE PROVIDER_API CALL
[Time Spent: ___ minutes] : I have spent [unfilled] minutes of time on the encounter. Frank Ba  Plastic Surgery  [address], [address]  [place] [address]  Phone: [phone]  Fax: [phone]  Follow Up Time: 2 weeks

## 2024-04-16 NOTE — DISCHARGE NOTE PROVIDER - CARE PROVIDERS DIRECT ADDRESSES
Trulance Pending    Additonal information required from provider.  Prescription Drug Insurance: [place] Medicaid    Notes: Faxed prior authorization request to [phone] for signature. Please sign and fax to [facility] [phone], and they will complete the PA.     Please update encounter when faxed to pharmacy. Thank you!     
[email or web address]
Statement Selected

## 2024-04-16 NOTE — DISCHARGE NOTE NURSING/CASE MANAGEMENT/SOCIAL WORK - PATIENT PORTAL LINK FT
You can access the FollowMyHealth Patient Portal offered by [facility] by registering at the following website: [email or web address]. By joining [employer]’s FollowMyHealth portal, you will also be able to view your health information using other applications (apps) compatible with our system.

## 2024-04-16 NOTE — PROGRESS NOTE ADULT - SUBJECTIVE AND OBJECTIVE BOX
Plastic Surgery Progress Note (pg [facility]: [id number], [facility]: [phone])    SUBJECTIVE  The patient was seen and examined.    OBJECTIVE  ___________________________________________________  VITAL SIGNS / I&O's   Vital Signs Last 24 Hrs  T(C): 36.5 (16 Apr 2024 06:30), Max: 36.9 (15 Apr 2024 13:26)  T(F): 97.7 (16 Apr 2024 06:30), Max: 98.4 (15 Apr 2024 13:26)  HR: 72 (16 Apr 2024 06:30) (65 - 76)  BP: 128/57 (16 Apr 2024 06:30) (120/68 - 143/64)  BP(mean): 81 (15 Apr 2024 19:00) (69 - 82)  RR: 18 (16 Apr 2024 06:30) (12 - 19)  SpO2: 97% (16 Apr 2024 06:30) (92% - 100%)    Parameters below as of 16 Apr 2024 06:30  Patient On (Oxygen Delivery Method): nasal cannula  O2 Flow (L/min): 2        15 Apr 2024 07:01  -  16 Apr 2024 07:00  --------------------------------------------------------  IN:    IV PiggyBack: 100 mL    Lactated Ringers: 1750 mL    Oral Fluid: 860 mL  Total IN: 2710 mL    OUT:    Voided (mL): 1500 mL  Total OUT: 1500 mL    Total NET: 1210 mL        ___________________________________________________  PHYSICAL EXAM    -- CONSTITUTIONAL: NAD, lying in bed  -- NEURO: Awake, alert  -- HEENT:R cheek with edema, soft  intraoral incision cdi, no drainage, oozing  head wrap in place cdi    ___________________________________________________  LABS            CAPILLARY BLOOD GLUCOSE      POCT Blood Glucose.: 227 mg/dL (15 Apr 2024 21:54)  POCT Blood Glucose.: 177 mg/dL (15 Apr 2024 19:32)  POCT Blood Glucose.: 155 mg/dL (15 Apr 2024 18:34)  POCT Blood Glucose.: 138 mg/dL (15 Apr 2024 13:44)          ___________________________________________________  MICRO  Recent Cultures:    ___________________________________________________  MEDICATIONS  (STANDING):  acetaminophen     Tablet .. 650 milliGRAM(s) Oral every 6 hours  carvedilol 3.125 milliGRAM(s) Oral every 12 hours  ceFAZolin   IVPB 3000 milliGRAM(s) IV Intermittent every 8 hours  chlorhexidine 0.12% Liquid 15 milliLiter(s) Oral Mucosa two times a day  dextrose 10% Bolus 125 milliLiter(s) IV Bolus once  dextrose 5%. 1000 milliLiter(s) (50 mL/Hr) IV Continuous <Continuous>  dextrose 5%. 1000 milliLiter(s) (100 mL/Hr) IV Continuous <Continuous>  dextrose 50% Injectable 25 Gram(s) IV Push once  dextrose 50% Injectable 25 Gram(s) IV Push once  dextrose 50% Injectable 12.5 Gram(s) IV Push once  dextrose Oral Gel 15 Gram(s) Oral once  glucagon  Injectable 1 milliGRAM(s) IntraMuscular once  insulin lispro (ADMELOG) corrective regimen sliding scale   SubCutaneous three times a day before meals  insulin lispro (ADMELOG) corrective regimen sliding scale   SubCutaneous at bedtime  lactated ringers. 1000 milliLiter(s) (125 mL/Hr) IV Continuous <Continuous>  lactated ringers. 1000 milliLiter(s) (30 mL/Hr) IV Continuous <Continuous>  melatonin 6 milliGRAM(s) Oral at bedtime  pregabalin 100 milliGRAM(s) Oral three times a day    MEDICATIONS  (PRN):  ondansetron Injectable 4 milliGRAM(s) IV Push every 6 hours PRN Nausea and/or Vomiting  oxyCODONE    IR 5 milliGRAM(s) Oral every 4 hours PRN Moderate Pain (4 - 6)  oxyCODONE    IR 5 milliGRAM(s) Oral every 4 hours PRN Severe Pain (7 - 10)  oxyCODONE    IR 10 milliGRAM(s) Oral every 6 hours PRN Severe Pain (7 - 10)

## 2024-04-16 NOTE — PROGRESS NOTE ADULT - ASSESSMENT
41F R cheek mass excision via intraoral approach on 4/15    Plan  - doing well, pain control  - FLD today  - dc home today

## 2024-04-16 NOTE — DISCHARGE NOTE PROVIDER - HOSPITAL COURSE
Patient was admitted to undergo intraoral removal of right cheek mass.  The patient tolerated the procedure well and was transferred to the floor in stable condition. Postoperatively, the patient's pain was well controlled with IV pain medications and later transitioned to PO pain meds, with adequate pain control. Patient is stable for discharge at this time. Patient in agreement with discharge plan. Prescriptions were sent to preferred pharmacy.

## 2024-04-16 NOTE — DISCHARGE NOTE PROVIDER - NSDCMRMEDTOKEN_GEN_ALL_CORE_FT
acetaminophen 325 mg oral tablet: 2 tab(s) orally every 6 hours  Augmentin 500 mg-125 mg oral tablet: 1 tab(s) orally every 12 hours MDD: 2  bumetanide 2 mg oral tablet: 1 tab(s) orally once a day  carvedilol 3.125 mg oral tablet: 1 tab(s) orally every 12 hours  lidocaine 5% topical film: Apply topically to affected area 2 times a day  lisinopril 5 mg oral tablet: 1 tab(s) orally once a day  metFORMIN 1000 mg oral tablet: 1 tab(s) orally 2 times a day  montelukast 10 mg oral tablet: 1 tab(s) orally once a day  Movantik 25 mg oral tablet: 1 tab(s) orally once a day  NovoLOG FlexPen 100 units/mL injectable solution: 10 unit(s) injectable 3 times a day (before meals)  oxyCODONE 10 mg oral tablet: 1 tab(s) orally every 4 hours as needed for Severe Pain (7 - 10) MDD: 60 mg  oxyCODONE 5 mg oral tablet: 1 tab(s) orally every 6 hours as needed for Moderate Pain (4 - 6) MDD: 4  pantoprazole 40 mg oral delayed release tablet: 1 tab(s) orally once a day (before a meal)  Peridex 0.12% mucous membrane liquid: 15 milliliter(s) orally every 12 hours  pregabalin 100 mg oral capsule: 1 cap(s) orally 3 times a day  Tresiba 100 units/mL subcutaneous solution: 44 international unit(s) subcutaneous once a day (at bedtime)

## 2024-04-16 NOTE — DISCHARGE NOTE PROVIDER - NSDCFUADDINST_GEN_ALL_CORE_FT
PAIN: You may continue to take  Acetaminophen (Tylenol) and  Ibuprofen (Advil, Motrin) over the counter for pain. You have also been prescribed some narcotic pain medication to take in the event you still have severe pain after taking tylenol and ibuprofen. Please do not drive while taking narcotic pain medication.    WOUND CARE:  Use prescribed mouth wash twice a day. Take antibiotics as prescribed. Avoid sharp foods.     NOTIFY US IF: You have any bleeding that does not stop, any pus draining from your wound(s), any fever (over 100.4 F) or chillls, persistent nausea/vomiting, persistent diarrhea, or if your pain is not controlled on your discharge pain medications.     FOLLOW-UP: Please call the office and make an appointment to follow up with Dr. Ba in 2 weeks. Please follow up with your primary care physician in 1-2 weeks regarding your hospitalization.

## 2024-04-17 ENCOUNTER — APPOINTMENT (OUTPATIENT)
Dept: HOME HEALTH SERVICES | Facility: HOME HEALTH | Age: 54
End: 2024-04-17

## 2024-04-17 PROBLEM — G58.8 OTHER SPECIFIED MONONEUROPATHIES: Chronic | Status: ACTIVE | Noted: 2024-04-11

## 2024-04-17 PROBLEM — Z86.79 PERSONAL HISTORY OF OTHER DISEASES OF THE CIRCULATORY SYSTEM: Chronic | Status: ACTIVE | Noted: 2024-04-11

## 2024-04-17 PROBLEM — G93.2 BENIGN INTRACRANIAL HYPERTENSION: Chronic | Status: ACTIVE | Noted: 2024-04-11

## 2024-04-18 PROBLEM — M54.50 CHRONIC LOW BACK PAIN, UNSPECIFIED BACK PAIN LATERALITY, UNSPECIFIED WHETHER SCIATICA PRESENT: Status: ACTIVE | Noted: 2021-06-30

## 2024-04-18 PROBLEM — G58.8 INTERCOSTAL NEURALGIA: Status: ACTIVE | Noted: 2020-10-27

## 2024-04-18 PROBLEM — M47.24 THORACIC RADICULOPATHY DUE TO DEGENERATIVE JOINT DISEASE OF SPINE: Status: ACTIVE | Noted: 2024-02-29

## 2024-04-18 PROBLEM — M47.814 THORACIC SPONDYLOSIS WITHOUT MYELOPATHY: Status: ACTIVE | Noted: 2024-02-29

## 2024-04-18 NOTE — PROCEDURE NOTE - NSSITEPREP_SKIN_A_CORE
chlorhexidine/povidone iodine (if allergic to chlorhexidine)/Adherence to aseptic technique: hand hygiene prior to donning barriers (gown, gloves), don cap and mask, sterile drape over patient
chlorhexidine
Attending with

## 2024-04-21 PROBLEM — R22.0 LOCALIZED SWELLING, MASS AND LUMP, HEAD: Chronic | Status: ACTIVE | Noted: 2024-04-11

## 2024-04-21 PROBLEM — J45.909 UNSPECIFIED ASTHMA, UNCOMPLICATED: Chronic | Status: ACTIVE | Noted: 2024-04-11

## 2024-04-21 PROBLEM — E11.9 TYPE 2 DIABETES MELLITUS WITHOUT COMPLICATIONS: Chronic | Status: ACTIVE | Noted: 2024-04-11

## 2024-04-21 PROBLEM — D21.9 BENIGN NEOPLASM OF CONNECTIVE AND OTHER SOFT TISSUE, UNSPECIFIED: Chronic | Status: ACTIVE | Noted: 2024-04-11

## 2024-04-22 ENCOUNTER — APPOINTMENT (OUTPATIENT)
Dept: PAIN MANAGEMENT | Facility: CLINIC | Age: 54
End: 2024-04-22

## 2024-04-22 DIAGNOSIS — G58.8 OTHER SPECIFIED MONONEUROPATHIES: ICD-10-CM

## 2024-04-22 DIAGNOSIS — M54.50 LOW BACK PAIN, UNSPECIFIED: ICD-10-CM

## 2024-04-22 DIAGNOSIS — G89.29 LOW BACK PAIN, UNSPECIFIED: ICD-10-CM

## 2024-04-22 DIAGNOSIS — M47.24 OTHER SPONDYLOSIS WITH RADICULOPATHY, THORACIC REGION: ICD-10-CM

## 2024-04-22 DIAGNOSIS — M47.814 SPONDYLOSIS W/OUT MYELOPATHY OR RADICULOPATHY, THORACIC REGION: ICD-10-CM

## 2024-04-22 LAB — SURGICAL PATHOLOGY STUDY: SIGNIFICANT CHANGE UP

## 2024-04-22 NOTE — PHYSICAL EXAM
[de-identified] : Gen: NAD Head: NC/AT Eyes: wears glasses, no scleral icterus ENT: mucous membranes moist CV: No JVD Lungs: nonlabored breathing Abd: soft, NT/ND Ext: full ROM in all extremities, no peripheral edema Back: +TTP in the right posterolateral chest wall and right thoracic and lumbar paraspinal region; limited extension 2/2 pain Neuro: CN intact LEs +5 L +5 R hip flexion +5 L +5 R leg extension +5 L +5 R leg flexion +5 L +5 R foot dorsiflexion +5 L +5 R foot plantarflexion +5 L +5 R EHL extension Psych: normal affect Skin: no visible lesions

## 2024-04-22 NOTE — HISTORY OF PRESENT ILLNESS
[Upper back] : upper back [Lower back] : lower back [Right Arm] : right arm [Right Leg] : right leg [Sharp] : sharp [Shooting] : shooting [Stabbing] : stabbing [Constant] : constant [Household chores] : household chores [Social interactions] : social interactions [Sitting] : sitting [Standing] : standing [Walking] : walking [Exercising] : exercising [Stairs] : stairs [Coughing] : coughing [] : yes [FreeTextEntry1] : 4/22/2024: ROBIN LÓPEZ is a 54 year-old woman presenting for a RPV for a history of chronic mid back pain.  The patient states that average pain over the past week was /10 in severity. Mood: Sleep:  3/18/2024: ROBIN LÓPEZ is a 53 year-old woman presenting for a NPV for a history of chronic mid back pain. The patient notes having some intermittent right-sided chest wall pain since ~2016. 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 At this time, the patient states that the worst of her pain is in the right mid back with radiation to the flank region. The patient notes that this pain radiates anteriorly to the lateral chest wall and abdominal wall. Pain is worse with increased activity, including sitting, standing, and walking. She notes that she is only able to walk for a few minutes before she has to stop and rest. The patient states that average pain over the past week was 8/10 in severity. Mood: Patient has depression and anxiety. She is not currently treated for this. Sleep: Patient notes difficulty with sleep initiation and maintenance 2/2 pain.

## 2024-04-23 ENCOUNTER — APPOINTMENT (OUTPATIENT)
Dept: HOME HEALTH SERVICES | Facility: HOME HEALTH | Age: 54
End: 2024-04-23
Payer: MEDICARE

## 2024-04-23 VITALS
TEMPERATURE: 97.88 F | RESPIRATION RATE: 18 BRPM | HEART RATE: 78 BPM | OXYGEN SATURATION: 98 % | DIASTOLIC BLOOD PRESSURE: 80 MMHG | SYSTOLIC BLOOD PRESSURE: 140 MMHG

## 2024-04-23 DIAGNOSIS — F11.90 OPIOID USE, UNSPECIFIED, UNCOMPLICATED: ICD-10-CM

## 2024-04-23 DIAGNOSIS — E78.5 HYPERLIPIDEMIA, UNSPECIFIED: ICD-10-CM

## 2024-04-23 PROCEDURE — 99495 TRANSJ CARE MGMT MOD F2F 14D: CPT

## 2024-04-25 PROBLEM — E78.5 HLD (HYPERLIPIDEMIA): Status: ACTIVE | Noted: 2024-03-05

## 2024-04-25 PROBLEM — F11.90 CHRONIC, CONTINUOUS USE OF OPIOIDS: Status: ACTIVE | Noted: 2024-02-29

## 2024-04-25 NOTE — HEALTH RISK ASSESSMENT
[Independent] : managing finances [Some assistance needed] : using transportation [No falls in past year] : Patient reported no falls in the past year [facility] 5 [HRA Reviewed] : Health Risk Assessment reviewed

## 2024-04-25 NOTE — REASON FOR VISIT
[Post-hospitalization from ___ Hospital] : Post-hospitalization from [unfilled] Hospital [Admitted on: ___] : The patient was admitted on [unfilled] [Discharged on ___] : discharged on [unfilled] [Discharge Summary] : discharge summary [Pertinent Labs] : pertinent labs [Discharge Med List] : discharge medication list [Med Reconciliation] : medication reconciliation has been completed [Patient Contacted By: ____] : and contacted by [unfilled] [FreeTextEntry2] : Hospital Course:  Discharge Date 16-Apr-2024  Admission Date 15-Apr-2024 17:03  Reason for Admission Removal right cheek mass Hospital Course  Patient was admitted to undergo intraoral removal of right cheek mass.  The patient tolerated the procedure well and was transferred to the floor in stable condition. Postoperatively, the patient's pain was well controlled with IV pain medications and later transitioned to PO pain meds, with adequate pain control. Patient is stable for discharge at this time. Patient in agreement with discharge plan. Prescriptions were sent to preferred pharmacy.  Med Reconciliation: Override IMPROVE-DD recommendations due to: This is a surgical and/or non-medical patient. Recommended Post-Discharge VTE Prophylaxis This is a surgical and/or non-medical patient. Medication Reconciliation Status Admission Reconciliation is Not Complete Discharge Reconciliation is Completed Discharge Medications acetaminophen 325 mg oral tablet: 2 tab(s) orally every 6 hours Augmentin 500 mg-125 mg oral tablet: 1 tab(s) orally every 12 hours MDD: 2 bumetanide 2 mg oral tablet: 1 tab(s) orally once a day carvedilol 3.125 mg oral tablet: 1 tab(s) orally every 12 hours lidocaine 5% topical film: Apply topically to affected area 2 times a day lisinopril 5 mg oral tablet: 1 tab(s) orally once a day metFORMIN 1000 mg oral tablet: 1 tab(s) orally 2 times a day montelukast 10 mg oral tablet: 1 tab(s) orally once a day Movantik 25 mg oral tablet: 1 tab(s) orally once a day NovoLOG FlexPen 100 units/mL injectable solution: 10 unit(s) injectable 3 times a day (before meals) oxyCODONE 10 mg oral tablet: 1 tab(s) orally every 4 hours as needed for Severe Pain (7 - 10) MDD: 60 mg oxyCODONE 5 mg oral tablet: 1 tab(s) orally every 6 hours as needed for Moderate Pain (4 - 6) MDD: 4 pantoprazole 40 mg oral delayed release tablet: 1 tab(s) orally once a day (before a meal) Peridex 0.12% mucous membrane liquid: 15 milliliter(s) orally every 12 hours pregabalin 100 mg oral capsule: 1 cap(s) orally 3 times a day Tresiba 100 units/mL subcutaneous solution: 44 international unit(s) subcutaneous once a day (at bedtime)  Care Plan/Procedures: Discharge Diagnoses, Assessment and Plan of Treatment PRINCIPAL DISCHARGE DIAGNOSIS Diagnosis: Cheek mass Assessment and Plan of Treatment:

## 2024-04-25 NOTE — CHRONIC CARE ASSESSMENT
[Oriented To Person] : ~L oriented to person [Oriented To Place] : ~L oriented to place [Oriented To Time] : ~L oriented to time [Oriented To Situation] : ~L oriented to situation [Alert] : ~L alert [PPS Score: ____] : Palliative Performance Scale (PPS) Score: [unfilled]

## 2024-04-25 NOTE — COUNSELING
[Obese (BMI >29.9)] : Obese - BMI >29.9 [DASH diet given] : DASH diet given [Non - Smoker] : non-smoker [Use assistive device to avoid falls] : use assistive device to avoid falls [Remove clutter and unsafe carpeting to avoid falls] : remove clutter and unsafe carpeting to avoid falls [Mammogram] : Mammogram [Improve mobility] : improve mobility [Improve weight] : improve weight [Improve pain control] : improve pain control [Decrease stress] : decrease stress [Decrease hospital use] : decrease hospital use [Minimize unnecessary interventions] : minimize unnecessary interventions [Completed Medical Orders for Life-Sustaining Treatment] : completed medical orders for life-sustaining treatment [Full Code] : Code Status: Full Code [No Limitations] : Treatment Guidelines: No limitations [Long Term Intubation] : Intubation: Long term intubation [[facility] Date: _____] [facility]/last verification date: [unfilled] [_____] : HCP: [unfilled]

## 2024-04-25 NOTE — PHYSICAL EXAM
[No Acute Distress] : no acute distress [Normal Voice/Communication] : normal voice communication [Normal Sclera/Conjunctiva] : normal sclera/conjunctiva [PERRL] : pupils equal, round and reactive to light [Normal Outer Ear/Nose] : the ears and nose were normal in appearance [Normal Oropharynx] : the oropharynx was normal [No JVD] : no jugular venous distention [Supple] : the neck was supple [No Respiratory Distress] : no respiratory distress [Clear to Auscultation] : lungs were clear to auscultation bilaterally [No Accessory Muscle Use] : no accessory muscle use [Normal Rate] : heart rate was normal  [Regular Rhythm] : with a regular rhythm [Normal Bowel Sounds] : normal bowel sounds [Non Tender] : non-tender [Soft] : abdomen soft [No CVA Tenderness] : no ~M costovertebral angle tenderness [No Spinal Tenderness] : no spinal tenderness [Normal Gait] : normal gait [No Rash] : no rash [No Skin Lesions] : no skin lesions [Oriented x3] : oriented to person, place, and time [de-identified] : +1 edema Isaak leg pt encourage to elevate legs when sitting.

## 2024-04-25 NOTE — HISTORY OF PRESENT ILLNESS
[Patient] : patient [FreeTextEntry1] [facility] [House Calls Co-Management Patient] : [unfilled] is a House Calls co-management patient [In-Place] : has Home Health services in-place [PT] : PT [OT] : OT [A] : A [Patient is stable - had PCP appoinment] : patient is stable - had PCP appointment [FreeTextEntry2] : Dr Erica Casillas [LastPCPVisitDate] : 3/4/2024

## 2024-05-02 ENCOUNTER — APPOINTMENT (OUTPATIENT)
Dept: PLASTIC SURGERY | Facility: CLINIC | Age: 54
End: 2024-05-02
Payer: MEDICARE

## 2024-05-02 DIAGNOSIS — R22.0 LOCALIZED SWELLING, MASS AND LUMP, HEAD: ICD-10-CM

## 2024-05-02 PROCEDURE — 99024 POSTOP FOLLOW-UP VISIT: CPT

## 2024-05-03 PROBLEM — R22.0 CHEEK MASS: Status: ACTIVE | Noted: 2023-06-29

## 2024-05-03 NOTE — H&P PST ADULT - CONSTITUTIONAL
Patient is due for CPE, diabetes follow up and labs.    Per chart review we have reached out to patient multiple times.    Removed from our list as patient has not been seen in over 2 years.  
well-groomed

## 2024-05-03 NOTE — HISTORY OF PRESENT ILLNESS
[FreeTextEntry1] : DOP 04/15/24 DIAGNOSIS:  Right facial mass, cheek. PROCEDURE PERFORMED:  Excision and debulking of right facial mass, 4cm, complex closure of face  for correction of asymmetry under facial nerve monitoring.  Final Diagnosis Right cheek mass, excision: - Lipoma. Patient experienced postoperative hematoma and right sided lower face muscle weakness

## 2024-05-07 ENCOUNTER — TRANSCRIPTION ENCOUNTER (OUTPATIENT)
Age: 54
End: 2024-05-07

## 2024-05-07 ENCOUNTER — INPATIENT (INPATIENT)
Facility: HOSPITAL | Age: 54
LOS: 10 days | Discharge: HOME | DRG: 74 | End: 2024-05-18
Attending: STUDENT IN AN ORGANIZED HEALTH CARE EDUCATION/TRAINING PROGRAM | Admitting: STUDENT IN AN ORGANIZED HEALTH CARE EDUCATION/TRAINING PROGRAM
Payer: COMMERCIAL

## 2024-05-07 ENCOUNTER — APPOINTMENT (OUTPATIENT)
Dept: HOME HEALTH SERVICES | Facility: HOME HEALTH | Age: 54
End: 2024-05-07

## 2024-05-07 VITALS
TEMPERATURE: 100 F | HEART RATE: 77 BPM | SYSTOLIC BLOOD PRESSURE: 141 MMHG | DIASTOLIC BLOOD PRESSURE: 84 MMHG | HEIGHT: 68 IN | RESPIRATION RATE: 16 BRPM | OXYGEN SATURATION: 95 % | WEIGHT: 293 LBS

## 2024-05-07 DIAGNOSIS — Z98.890 OTHER SPECIFIED POSTPROCEDURAL STATES: Chronic | ICD-10-CM

## 2024-05-07 DIAGNOSIS — Z90.49 ACQUIRED ABSENCE OF OTHER SPECIFIED PARTS OF DIGESTIVE TRACT: Chronic | ICD-10-CM

## 2024-05-07 DIAGNOSIS — Z96.82 PRESENCE OF NEUROSTIMULATOR: Chronic | ICD-10-CM

## 2024-05-07 LAB
BASOPHILS # BLD AUTO: 0.03 K/UL — SIGNIFICANT CHANGE UP (ref 0–0.2)
BASOPHILS NFR BLD AUTO: 0.2 % — SIGNIFICANT CHANGE UP (ref 0–2)
EOSINOPHIL # BLD AUTO: 0.14 K/UL — SIGNIFICANT CHANGE UP (ref 0–0.5)
EOSINOPHIL NFR BLD AUTO: 1.1 % — SIGNIFICANT CHANGE UP (ref 0–6)
HCT VFR BLD CALC: 37 % — SIGNIFICANT CHANGE UP (ref 34.5–45)
HGB BLD-MCNC: 12.1 G/DL — SIGNIFICANT CHANGE UP (ref 11.5–15.5)
IMM GRANULOCYTES NFR BLD AUTO: 0.2 % — SIGNIFICANT CHANGE UP (ref 0–0.9)
LYMPHOCYTES # BLD AUTO: 2.77 K/UL — SIGNIFICANT CHANGE UP (ref 1–3.3)
LYMPHOCYTES # BLD AUTO: 22.5 % — SIGNIFICANT CHANGE UP (ref 13–44)
MCHC RBC-ENTMCNC: 28.9 PG — SIGNIFICANT CHANGE UP (ref 27–34)
MCHC RBC-ENTMCNC: 32.7 GM/DL — SIGNIFICANT CHANGE UP (ref 32–36)
MCV RBC AUTO: 88.5 FL — SIGNIFICANT CHANGE UP (ref 80–100)
MONOCYTES # BLD AUTO: 0.75 K/UL — SIGNIFICANT CHANGE UP (ref 0–0.9)
MONOCYTES NFR BLD AUTO: 6.1 % — SIGNIFICANT CHANGE UP (ref 2–14)
NEUTROPHILS # BLD AUTO: 8.58 K/UL — HIGH (ref 1.8–7.4)
NEUTROPHILS NFR BLD AUTO: 69.9 % — SIGNIFICANT CHANGE UP (ref 43–77)
NRBC # BLD: 0 /100 WBCS — SIGNIFICANT CHANGE UP (ref 0–0)
PLATELET # BLD AUTO: 449 K/UL — HIGH (ref 150–400)
RBC # BLD: 4.18 M/UL — SIGNIFICANT CHANGE UP (ref 3.8–5.2)
RBC # FLD: 13.2 % — SIGNIFICANT CHANGE UP (ref 10.3–14.5)
WBC # BLD: 12.3 K/UL — HIGH (ref 3.8–10.5)
WBC # FLD AUTO: 12.3 K/UL — HIGH (ref 3.8–10.5)

## 2024-05-07 PROCEDURE — 99285 EMERGENCY DEPT VISIT HI MDM: CPT | Mod: 25

## 2024-05-07 NOTE — ED ADULT TRIAGE NOTE - STATUS:
Cosentyx Counseling:  I discussed with the patient the risks of Cosentyx including but not limited to worsening of Crohn's disease, immunosuppression, allergic reactions and infections.  The patient understands that monitoring is required including a PPD at baseline and must alert us or the primary physician if symptoms of infection or other concerning signs are noted. Applied

## 2024-05-07 NOTE — ED CLERICAL - NS ED CARE COORDINATION INFORMATION
This patient is enrolled in the House Calls Program and receives comprehensive home-based primary care.  To obtain additional clinical information , or to discuss any questions or concerns, you can call the [facility] Calls team at [phone], 24 hours a day.  If discharged, this patient will be followed up by the [facility]s team within 2 days.  If this patient requires admission, please use the hospitalist service.

## 2024-05-08 ENCOUNTER — NON-APPOINTMENT (OUTPATIENT)
Age: 54
End: 2024-05-08

## 2024-05-08 DIAGNOSIS — G90.50 COMPLEX REGIONAL PAIN SYNDROME I, UNSPECIFIED: ICD-10-CM

## 2024-05-08 DIAGNOSIS — R10.9 UNSPECIFIED ABDOMINAL PAIN: ICD-10-CM

## 2024-05-08 DIAGNOSIS — E11.9 TYPE 2 DIABETES MELLITUS WITHOUT COMPLICATIONS: ICD-10-CM

## 2024-05-08 DIAGNOSIS — D72.829 ELEVATED WHITE BLOOD CELL COUNT, UNSPECIFIED: ICD-10-CM

## 2024-05-08 DIAGNOSIS — I50.9 HEART FAILURE, UNSPECIFIED: ICD-10-CM

## 2024-05-08 DIAGNOSIS — R22.0 LOCALIZED SWELLING, MASS AND LUMP, HEAD: ICD-10-CM

## 2024-05-08 DIAGNOSIS — E78.5 HYPERLIPIDEMIA, UNSPECIFIED: ICD-10-CM

## 2024-05-08 DIAGNOSIS — M79.2 NEURALGIA AND NEURITIS, UNSPECIFIED: ICD-10-CM

## 2024-05-08 DIAGNOSIS — I10 ESSENTIAL (PRIMARY) HYPERTENSION: ICD-10-CM

## 2024-05-08 DIAGNOSIS — Z29.9 ENCOUNTER FOR PROPHYLACTIC MEASURES, UNSPECIFIED: ICD-10-CM

## 2024-05-08 LAB
ALBUMIN SERPL ELPH-MCNC: 3.1 G/DL — LOW (ref 3.5–5)
ALP SERPL-CCNC: 140 U/L — HIGH (ref 40–120)
ALT FLD-CCNC: 28 U/L DA — SIGNIFICANT CHANGE UP (ref 10–60)
ANION GAP SERPL CALC-SCNC: 7 MMOL/L — SIGNIFICANT CHANGE UP (ref 5–17)
ANION GAP SERPL CALC-SCNC: 8 MMOL/L — SIGNIFICANT CHANGE UP (ref 5–17)
APPEARANCE UR: ABNORMAL
AST SERPL-CCNC: 19 U/L — SIGNIFICANT CHANGE UP (ref 10–40)
BACTERIA # UR AUTO: ABNORMAL /HPF
BILIRUB SERPL-MCNC: 0.3 MG/DL — SIGNIFICANT CHANGE UP (ref 0.2–1.2)
BILIRUB UR-MCNC: NEGATIVE — SIGNIFICANT CHANGE UP
BUN SERPL-MCNC: 14 MG/DL — SIGNIFICANT CHANGE UP (ref 7–18)
BUN SERPL-MCNC: 16 MG/DL — SIGNIFICANT CHANGE UP (ref 7–18)
CALCIUM SERPL-MCNC: 7.3 MG/DL — LOW (ref 8.4–10.5)
CALCIUM SERPL-MCNC: 7.5 MG/DL — LOW (ref 8.4–10.5)
CHLORIDE SERPL-SCNC: 102 MMOL/L — SIGNIFICANT CHANGE UP (ref 96–108)
CHLORIDE SERPL-SCNC: 102 MMOL/L — SIGNIFICANT CHANGE UP (ref 96–108)
CO2 SERPL-SCNC: 28 MMOL/L — SIGNIFICANT CHANGE UP (ref 22–31)
CO2 SERPL-SCNC: 30 MMOL/L — SIGNIFICANT CHANGE UP (ref 22–31)
COLOR SPEC: YELLOW — SIGNIFICANT CHANGE UP
COMMENT - URINE: SIGNIFICANT CHANGE UP
CREAT SERPL-MCNC: 1 MG/DL — SIGNIFICANT CHANGE UP (ref 0.5–1.3)
CREAT SERPL-MCNC: 1.11 MG/DL — SIGNIFICANT CHANGE UP (ref 0.5–1.3)
DIFF PNL FLD: ABNORMAL
EGFR: 59 ML/MIN/1.73M2 — LOW
EGFR: 67 ML/MIN/1.73M2 — SIGNIFICANT CHANGE UP
EPI CELLS # UR: PRESENT
GLUCOSE BLDC GLUCOMTR-MCNC: 157 MG/DL — HIGH (ref 70–99)
GLUCOSE BLDC GLUCOMTR-MCNC: 209 MG/DL — HIGH (ref 70–99)
GLUCOSE BLDC GLUCOMTR-MCNC: 218 MG/DL — HIGH (ref 70–99)
GLUCOSE SERPL-MCNC: 188 MG/DL — HIGH (ref 70–99)
GLUCOSE SERPL-MCNC: 190 MG/DL — HIGH (ref 70–99)
GLUCOSE UR QL: NEGATIVE MG/DL — SIGNIFICANT CHANGE UP
HCG SERPL-ACNC: <1 MIU/ML — SIGNIFICANT CHANGE UP
KETONES UR-MCNC: ABNORMAL MG/DL
LEUKOCYTE ESTERASE UR-ACNC: ABNORMAL
LIDOCAIN IGE QN: 25 U/L — SIGNIFICANT CHANGE UP (ref 13–75)
NITRITE UR-MCNC: NEGATIVE — SIGNIFICANT CHANGE UP
NT-PROBNP SERPL-SCNC: 91 PG/ML — SIGNIFICANT CHANGE UP (ref 0–125)
PH UR: 6 — SIGNIFICANT CHANGE UP (ref 5–8)
POTASSIUM SERPL-MCNC: 2.9 MMOL/L — CRITICAL LOW (ref 3.5–5.3)
POTASSIUM SERPL-MCNC: 2.9 MMOL/L — CRITICAL LOW (ref 3.5–5.3)
POTASSIUM SERPL-SCNC: 2.9 MMOL/L — CRITICAL LOW (ref 3.5–5.3)
POTASSIUM SERPL-SCNC: 2.9 MMOL/L — CRITICAL LOW (ref 3.5–5.3)
PROT SERPL-MCNC: 7.6 G/DL — SIGNIFICANT CHANGE UP (ref 6–8.3)
PROT UR-MCNC: ABNORMAL MG/DL
RBC CASTS # UR COMP ASSIST: 2 /HPF — SIGNIFICANT CHANGE UP (ref 0–4)
SODIUM SERPL-SCNC: 137 MMOL/L — SIGNIFICANT CHANGE UP (ref 135–145)
SODIUM SERPL-SCNC: 140 MMOL/L — SIGNIFICANT CHANGE UP (ref 135–145)
SP GR SPEC: 1.03 — SIGNIFICANT CHANGE UP (ref 1–1.03)
TROPONIN I, HIGH SENSITIVITY RESULT: 13.2 NG/L — SIGNIFICANT CHANGE UP
UROBILINOGEN FLD QL: 1 MG/DL — SIGNIFICANT CHANGE UP (ref 0.2–1)
WBC UR QL: 15 /HPF — HIGH (ref 0–5)

## 2024-05-08 PROCEDURE — 99223 1ST HOSP IP/OBS HIGH 75: CPT | Mod: GC

## 2024-05-08 PROCEDURE — 70487 CT MAXILLOFACIAL W/DYE: CPT | Mod: 26

## 2024-05-08 PROCEDURE — 99222 1ST HOSP IP/OBS MODERATE 55: CPT

## 2024-05-08 PROCEDURE — 71046 X-RAY EXAM CHEST 2 VIEWS: CPT | Mod: 26

## 2024-05-08 PROCEDURE — 74176 CT ABD & PELVIS W/O CONTRAST: CPT | Mod: 26,MC

## 2024-05-08 RX ORDER — INSULIN LISPRO 100/ML
4 VIAL (ML) SUBCUTANEOUS
Refills: 0 | Status: DISCONTINUED | OUTPATIENT
Start: 2024-05-08 | End: 2024-05-09

## 2024-05-08 RX ORDER — LIDOCAINE 4 G/100G
3 CREAM TOPICAL DAILY
Refills: 0 | Status: DISCONTINUED | OUTPATIENT
Start: 2024-05-08 | End: 2024-05-18

## 2024-05-08 RX ORDER — HYDROMORPHONE HYDROCHLORIDE 2 MG/ML
1 INJECTION INTRAMUSCULAR; INTRAVENOUS; SUBCUTANEOUS ONCE
Refills: 0 | Status: DISCONTINUED | OUTPATIENT
Start: 2024-05-08 | End: 2024-05-08

## 2024-05-08 RX ORDER — AMPICILLIN SODIUM AND SULBACTAM SODIUM 250; 125 MG/ML; MG/ML
3 INJECTION, POWDER, FOR SUSPENSION INTRAMUSCULAR; INTRAVENOUS ONCE
Refills: 0 | Status: COMPLETED | OUTPATIENT
Start: 2024-05-08 | End: 2024-05-08

## 2024-05-08 RX ORDER — ONDANSETRON 8 MG/1
4 TABLET, FILM COATED ORAL ONCE
Refills: 0 | Status: COMPLETED | OUTPATIENT
Start: 2024-05-08 | End: 2024-05-08

## 2024-05-08 RX ORDER — POTASSIUM CHLORIDE 20 MEQ
10 PACKET (EA) ORAL
Refills: 0 | Status: COMPLETED | OUTPATIENT
Start: 2024-05-08 | End: 2024-05-09

## 2024-05-08 RX ORDER — KETOROLAC TROMETHAMINE 30 MG/ML
15 SYRINGE (ML) INJECTION ONCE
Refills: 0 | Status: DISCONTINUED | OUTPATIENT
Start: 2024-05-08 | End: 2024-05-08

## 2024-05-08 RX ORDER — LIDOCAINE 4 G/100G
1 CREAM TOPICAL
Refills: 0 | DISCHARGE

## 2024-05-08 RX ORDER — LISINOPRIL 2.5 MG/1
5 TABLET ORAL DAILY
Refills: 0 | Status: DISCONTINUED | OUTPATIENT
Start: 2024-05-08 | End: 2024-05-08

## 2024-05-08 RX ORDER — MONTELUKAST 4 MG/1
10 TABLET, CHEWABLE ORAL AT BEDTIME
Refills: 0 | Status: DISCONTINUED | OUTPATIENT
Start: 2024-05-08 | End: 2024-05-18

## 2024-05-08 RX ORDER — BUMETANIDE 0.25 MG/ML
2 INJECTION INTRAMUSCULAR; INTRAVENOUS DAILY
Refills: 0 | Status: DISCONTINUED | OUTPATIENT
Start: 2024-05-08 | End: 2024-05-18

## 2024-05-08 RX ORDER — CARVEDILOL PHOSPHATE 80 MG/1
3.12 CAPSULE, EXTENDED RELEASE ORAL EVERY 12 HOURS
Refills: 0 | Status: DISCONTINUED | OUTPATIENT
Start: 2024-05-08 | End: 2024-05-18

## 2024-05-08 RX ORDER — ONDANSETRON 8 MG/1
4 TABLET, FILM COATED ORAL EVERY 8 HOURS
Refills: 0 | Status: DISCONTINUED | OUTPATIENT
Start: 2024-05-08 | End: 2024-05-18

## 2024-05-08 RX ORDER — MORPHINE SULFATE 50 MG/1
4 CAPSULE, EXTENDED RELEASE ORAL ONCE
Refills: 0 | Status: DISCONTINUED | OUTPATIENT
Start: 2024-05-08 | End: 2024-05-08

## 2024-05-08 RX ORDER — INSULIN GLARGINE 100 [IU]/ML
25 INJECTION, SOLUTION SUBCUTANEOUS AT BEDTIME
Refills: 0 | Status: DISCONTINUED | OUTPATIENT
Start: 2024-05-08 | End: 2024-05-09

## 2024-05-08 RX ORDER — ACETAMINOPHEN 500 MG
650 TABLET ORAL EVERY 6 HOURS
Refills: 0 | Status: DISCONTINUED | OUTPATIENT
Start: 2024-05-08 | End: 2024-05-16

## 2024-05-08 RX ORDER — SODIUM CHLORIDE 9 MG/ML
1000 INJECTION INTRAMUSCULAR; INTRAVENOUS; SUBCUTANEOUS ONCE
Refills: 0 | Status: DISCONTINUED | OUTPATIENT
Start: 2024-05-08 | End: 2024-05-08

## 2024-05-08 RX ORDER — OXYCODONE HYDROCHLORIDE 5 MG/1
10 TABLET ORAL ONCE
Refills: 0 | Status: DISCONTINUED | OUTPATIENT
Start: 2024-05-08 | End: 2024-05-08

## 2024-05-08 RX ORDER — OXYCODONE HYDROCHLORIDE 5 MG/1
10 TABLET ORAL EVERY 4 HOURS
Refills: 0 | Status: DISCONTINUED | OUTPATIENT
Start: 2024-05-08 | End: 2024-05-10

## 2024-05-08 RX ORDER — AMPICILLIN SODIUM AND SULBACTAM SODIUM 250; 125 MG/ML; MG/ML
3 INJECTION, POWDER, FOR SUSPENSION INTRAMUSCULAR; INTRAVENOUS EVERY 6 HOURS
Refills: 0 | Status: DISCONTINUED | OUTPATIENT
Start: 2024-05-09 | End: 2024-05-18

## 2024-05-08 RX ORDER — LANOLIN ALCOHOL/MO/W.PET/CERES
6 CREAM (GRAM) TOPICAL AT BEDTIME
Refills: 0 | Status: DISCONTINUED | OUTPATIENT
Start: 2024-05-08 | End: 2024-05-18

## 2024-05-08 RX ORDER — AMPICILLIN SODIUM AND SULBACTAM SODIUM 250; 125 MG/ML; MG/ML
INJECTION, POWDER, FOR SUSPENSION INTRAMUSCULAR; INTRAVENOUS
Refills: 0 | Status: DISCONTINUED | OUTPATIENT
Start: 2024-05-08 | End: 2024-05-18

## 2024-05-08 RX ORDER — ATORVASTATIN CALCIUM 80 MG/1
20 TABLET, FILM COATED ORAL AT BEDTIME
Refills: 0 | Status: DISCONTINUED | OUTPATIENT
Start: 2024-05-08 | End: 2024-05-18

## 2024-05-08 RX ORDER — PANTOPRAZOLE SODIUM 20 MG/1
40 TABLET, DELAYED RELEASE ORAL
Refills: 0 | Status: DISCONTINUED | OUTPATIENT
Start: 2024-05-08 | End: 2024-05-18

## 2024-05-08 RX ORDER — LANOLIN ALCOHOL/MO/W.PET/CERES
3 CREAM (GRAM) TOPICAL AT BEDTIME
Refills: 0 | Status: DISCONTINUED | OUTPATIENT
Start: 2024-05-08 | End: 2024-05-08

## 2024-05-08 RX ORDER — POTASSIUM CHLORIDE 20 MEQ
40 PACKET (EA) ORAL ONCE
Refills: 0 | Status: DISCONTINUED | OUTPATIENT
Start: 2024-05-08 | End: 2024-05-10

## 2024-05-08 RX ORDER — LIDOCAINE 4 G/100G
1 CREAM TOPICAL EVERY 24 HOURS
Refills: 0 | Status: DISCONTINUED | OUTPATIENT
Start: 2024-05-08 | End: 2024-05-08

## 2024-05-08 RX ORDER — ENOXAPARIN SODIUM 100 MG/ML
60 INJECTION SUBCUTANEOUS EVERY 12 HOURS
Refills: 0 | Status: DISCONTINUED | OUTPATIENT
Start: 2024-05-08 | End: 2024-05-18

## 2024-05-08 RX ORDER — NALOXEGOL OXALATE 12.5 MG/1
25 TABLET, FILM COATED ORAL DAILY
Refills: 0 | Status: DISCONTINUED | OUTPATIENT
Start: 2024-05-08 | End: 2024-05-08

## 2024-05-08 RX ORDER — HYDROMORPHONE HYDROCHLORIDE 2 MG/ML
0.5 INJECTION INTRAMUSCULAR; INTRAVENOUS; SUBCUTANEOUS EVERY 6 HOURS
Refills: 0 | Status: DISCONTINUED | OUTPATIENT
Start: 2024-05-08 | End: 2024-05-08

## 2024-05-08 RX ORDER — ACETAMINOPHEN 500 MG
1000 TABLET ORAL EVERY 8 HOURS
Refills: 0 | Status: COMPLETED | OUTPATIENT
Start: 2024-05-08 | End: 2024-05-11

## 2024-05-08 RX ORDER — POTASSIUM CHLORIDE 20 MEQ
40 PACKET (EA) ORAL ONCE
Refills: 0 | Status: COMPLETED | OUTPATIENT
Start: 2024-05-08 | End: 2024-05-08

## 2024-05-08 RX ORDER — TIZANIDINE 4 MG/1
4 TABLET ORAL EVERY 8 HOURS
Refills: 0 | Status: DISCONTINUED | OUTPATIENT
Start: 2024-05-08 | End: 2024-05-10

## 2024-05-08 RX ORDER — ACETAMINOPHEN 500 MG
1000 TABLET ORAL ONCE
Refills: 0 | Status: COMPLETED | OUTPATIENT
Start: 2024-05-08 | End: 2024-05-08

## 2024-05-08 RX ORDER — INSULIN LISPRO 100/ML
VIAL (ML) SUBCUTANEOUS
Refills: 0 | Status: DISCONTINUED | OUTPATIENT
Start: 2024-05-08 | End: 2024-05-18

## 2024-05-08 RX ORDER — INSULIN LISPRO 100/ML
VIAL (ML) SUBCUTANEOUS AT BEDTIME
Refills: 0 | Status: DISCONTINUED | OUTPATIENT
Start: 2024-05-08 | End: 2024-05-18

## 2024-05-08 RX ADMIN — OXYCODONE HYDROCHLORIDE 10 MILLIGRAM(S): 5 TABLET ORAL at 14:59

## 2024-05-08 RX ADMIN — MORPHINE SULFATE 4 MILLIGRAM(S): 50 CAPSULE, EXTENDED RELEASE ORAL at 08:29

## 2024-05-08 RX ADMIN — LIDOCAINE 1 PATCH: 4 CREAM TOPICAL at 19:59

## 2024-05-08 RX ADMIN — Medication 1000 MILLIGRAM(S): at 16:50

## 2024-05-08 RX ADMIN — HYDROMORPHONE HYDROCHLORIDE 1 MILLIGRAM(S): 2 INJECTION INTRAMUSCULAR; INTRAVENOUS; SUBCUTANEOUS at 11:30

## 2024-05-08 RX ADMIN — ENOXAPARIN SODIUM 60 MILLIGRAM(S): 100 INJECTION SUBCUTANEOUS at 20:21

## 2024-05-08 RX ADMIN — Medication 100 MILLIEQUIVALENT(S): at 22:39

## 2024-05-08 RX ADMIN — LIDOCAINE 3 PATCH: 4 CREAM TOPICAL at 23:09

## 2024-05-08 RX ADMIN — ONDANSETRON 4 MILLIGRAM(S): 8 TABLET, FILM COATED ORAL at 01:29

## 2024-05-08 RX ADMIN — Medication 1000 MILLIGRAM(S): at 10:40

## 2024-05-08 RX ADMIN — Medication 1000 MILLIGRAM(S): at 17:30

## 2024-05-08 RX ADMIN — Medication 400 MILLIGRAM(S): at 00:42

## 2024-05-08 RX ADMIN — MORPHINE SULFATE 4 MILLIGRAM(S): 50 CAPSULE, EXTENDED RELEASE ORAL at 03:00

## 2024-05-08 RX ADMIN — HYDROMORPHONE HYDROCHLORIDE 1 MILLIGRAM(S): 2 INJECTION INTRAMUSCULAR; INTRAVENOUS; SUBCUTANEOUS at 10:40

## 2024-05-08 RX ADMIN — LIDOCAINE 3 PATCH: 4 CREAM TOPICAL at 11:14

## 2024-05-08 RX ADMIN — Medication 40 MILLIEQUIVALENT(S): at 09:49

## 2024-05-08 RX ADMIN — Medication 6 MILLIGRAM(S): at 22:17

## 2024-05-08 RX ADMIN — Medication 100 MILLIGRAM(S): at 13:01

## 2024-05-08 RX ADMIN — MORPHINE SULFATE 4 MILLIGRAM(S): 50 CAPSULE, EXTENDED RELEASE ORAL at 01:22

## 2024-05-08 RX ADMIN — LIDOCAINE 1 PATCH: 4 CREAM TOPICAL at 09:49

## 2024-05-08 RX ADMIN — OXYCODONE HYDROCHLORIDE 10 MILLIGRAM(S): 5 TABLET ORAL at 20:21

## 2024-05-08 RX ADMIN — INSULIN GLARGINE 25 UNIT(S): 100 INJECTION, SOLUTION SUBCUTANEOUS at 22:48

## 2024-05-08 RX ADMIN — Medication 1000 MILLIGRAM(S): at 22:17

## 2024-05-08 RX ADMIN — Medication 4 UNIT(S): at 11:55

## 2024-05-08 RX ADMIN — Medication 2: at 16:49

## 2024-05-08 RX ADMIN — Medication 4: at 11:56

## 2024-05-08 RX ADMIN — Medication 1000 MILLIGRAM(S): at 01:19

## 2024-05-08 RX ADMIN — Medication 1000 MILLIGRAM(S): at 11:31

## 2024-05-08 RX ADMIN — MORPHINE SULFATE 4 MILLIGRAM(S): 50 CAPSULE, EXTENDED RELEASE ORAL at 00:42

## 2024-05-08 RX ADMIN — LIDOCAINE 3 PATCH: 4 CREAM TOPICAL at 19:59

## 2024-05-08 RX ADMIN — OXYCODONE HYDROCHLORIDE 10 MILLIGRAM(S): 5 TABLET ORAL at 14:29

## 2024-05-08 RX ADMIN — Medication 100 MILLIGRAM(S): at 22:23

## 2024-05-08 RX ADMIN — AMPICILLIN SODIUM AND SULBACTAM SODIUM 200 GRAM(S): 250; 125 INJECTION, POWDER, FOR SUSPENSION INTRAMUSCULAR; INTRAVENOUS at 17:53

## 2024-05-08 RX ADMIN — LIDOCAINE 1 PATCH: 4 CREAM TOPICAL at 21:28

## 2024-05-08 RX ADMIN — MONTELUKAST 10 MILLIGRAM(S): 4 TABLET, CHEWABLE ORAL at 22:18

## 2024-05-08 RX ADMIN — ATORVASTATIN CALCIUM 20 MILLIGRAM(S): 80 TABLET, FILM COATED ORAL at 22:17

## 2024-05-08 RX ADMIN — Medication 4 UNIT(S): at 16:50

## 2024-05-08 RX ADMIN — TIZANIDINE 4 MILLIGRAM(S): 4 TABLET ORAL at 13:01

## 2024-05-08 RX ADMIN — OXYCODONE HYDROCHLORIDE 10 MILLIGRAM(S): 5 TABLET ORAL at 21:00

## 2024-05-08 RX ADMIN — Medication 1000 MILLIGRAM(S): at 23:26

## 2024-05-08 RX ADMIN — MORPHINE SULFATE 4 MILLIGRAM(S): 50 CAPSULE, EXTENDED RELEASE ORAL at 06:37

## 2024-05-08 RX ADMIN — Medication 40 MILLIEQUIVALENT(S): at 01:10

## 2024-05-08 RX ADMIN — TIZANIDINE 4 MILLIGRAM(S): 4 TABLET ORAL at 22:18

## 2024-05-08 NOTE — H&P ADULT - NSHPREVIEWOFSYSTEMS_GEN_ALL_CORE
CONSTITUTIONAL: + fever  RESPIRATORY: No cough, wheezing, chills or hemoptysis; No shortness of breath  CARDIOVASCULAR: No chest pain, palpitations, dizziness, or leg swelling  GASTROINTESTINAL: No abdominal pain. No nausea, vomiting, or hematemesis; No diarrhea or constipation. No melena or hematochezia.  GENITOURINARY: No dysuria or hematuria, urinary frequency  NEUROLOGICAL: No headaches, memory loss, loss of strength, numbness, or tremors  ENDOCRINE: No polyuria, polydipsia, or heat/cold intolerance  MUSCULOSKELETAL: No muscle aches, joint pains  HEME: no easy bruisability, no tender or enlarged lymph nodes  SKIN: No itching, burning, rashes, or lesions .

## 2024-05-08 NOTE — ED ADULT NURSE NOTE - ED STAT RN HAND OFF
Sakshi is calling back today and she would like these verbal orders today.  Ok to leave a detailed message if she doesn't answer.    Handoff

## 2024-05-08 NOTE — H&P ADULT - PROBLEM SELECTOR PLAN 6
Pt has a history of HTN, takes Bumex 2mg daily and coreg 3.125mg BID and lisinopril 5mg at home  - C/w home meds

## 2024-05-08 NOTE — ED PROVIDER NOTE - CLINICAL SUMMARY MEDICAL DECISION MAKING FREE TEXT BOX
54-year-old obese female history of diabetes, hypertension, intercostal neuralgia, complex regional pain syndrome with spinal cord stimulator, presenting with chief complaint of severe right-sided flank pain typical of intercoastal neuralgia. VSS, no CVAT. No dysuria or hematuria to suggest pyelonephritis/uti, will check uti to evaluate. CT noncon for kidney stone. Analgesia, reassess.

## 2024-05-08 NOTE — H&P ADULT - NSHPPHYSICALEXAM_GEN_ALL_CORE
GENERAL: Moderate distress, obese female, sitting on side of bed, appears to be in pain  HEAD:  Atraumatic, Normocephalic  EYES: EOMI, PERRLA, conjunctiva and sclera clear  NECK: Supple  MOUTH: stitches visible on the right upper mouth and side, no active draining visible inside, No tongue inflammation  FACE: Right cheek swelling and facial droop, tenderness on exam and warmth. No erythema  CHEST/LUNG: Clear to auscultation bilaterally, no RRW  HEART: Regular rate and rhythm; No murmurs, rubs, or gallops  ABDOMEN: Soft, Nontender, Nondistended; Bowel sounds present  EXTREMITIES:  2+ Peripheral Pulses. 1+ pitting edema b/l  PSYCH: AAOx3  NEUROLOGY: non-focal  SKIN: No rashes or lesions

## 2024-05-08 NOTE — CONSULT NOTE ADULT - ASSESSMENT
Search Terms: Fauzia Linares, 1970Search Date: 05/08/2024 09:30:53 AM  The Drug Utilization Report below displays all of the controlled substance prescriptions, if any, that your patient has filled in the last twelve months. The information displayed on this report is compiled from pharmacy submissions to the Department, and accurately reflects the information as submitted by the pharmacies.    This report was requested by: Danni Silva | Reference #: [id number]    Practitioner Count: 5  Pharmacy Count: 2  Current Opioid Prescriptions: 1  Current Benzodiazepine Prescriptions: 0  Current Stimulant Prescriptions: 0      Patient Demographic Information (PDI)       PDI	First Name	Last Name	Birth Date	Gender	Street Address	[facility]	Zip Code  A	Fauzia Linares	1970	Female	[address]	[place]	[address]  B	Fauzia Linares	1970	Female	[address]	[place]	[address]  C	Fauzia Linares	1970	Female	[address]	[place]	[address]  D	Fauzia Linares	1970	Female	[address]	[place]	[address]  E	Fauzia Linares	1970	Female	[address]	[place]	[address]  F	Fauzia Linares	1970	Female	[address]	[facility]	[place]	[address]  G	Fauzia Linares	1970	Female	[address]	[place]	[address]    Prescription Information      PDI Filter:    PDI	My Rx	Current Rx	Drug Type	Rx Written	Rx Dispensed	Drug	Quantity	Days Supply	Prescriber Name	Prescriber CROW #	Payment Method	Dispenser  A	N	N	O	03/26/2024	03/26/2024	oxycodone hcl (ir) 5 mg tablet	30	5	Quinones, Elizabeth MERLOS	[id number]	[facility]	N	N	O	03/26/2024	03/26/2024	oxycodone hcl (ir) 10 mg tab	30	5	Quinones, Elizabeth MERLOS	[id number]	[facility]  A	N	N	O	03/26/2024	03/26/2024	oxycontin er 10 mg tablet	10	5	Quinones, Elizabeth MERLOS	[id number]	[facility]  B	N	N		10/06/2023	10/07/2023	pregabalin 100 mg capsule	90	30	Tc, Reba	[id number]	Medicare	[facility]  B	N	N	O	09/28/2023	10/01/2023	oxycodone hcl (ir) 15 mg tab	30	5	Tc, Reba	[id number]	Medicare	[facility]  B	N	N	O	09/27/2023	09/27/2023	oxycodone hcl (ir) 15 mg tab	30	8	Tc, Reba	[id number]	Medicare	[facility]  B	N	N	O	09/10/2023	09/10/2023	oxycodone hcl (ir) 15 mg tab	30	5	Tc, Reba	[id number]	Medicare	[facility]  B	N	N		09/06/2023	09/06/2023	pregabalin 100 mg capsule	90	30	Tc, Reba	[id number]	Medicare	[facility]  B	N	N	O	08/27/2023	08/28/2023	oxycodone hcl (ir) 15 mg tab	30	5	Tc, Reba	[id number]	Medicare	[facility]  B	N	N	O	08/12/2023	08/13/2023	oxycodone hcl (ir) 15 mg tab	30	5	Tc, Reba	[id number]	Medicare	[facility]  B	N	N		08/04/2023	08/05/2023	pregabalin 100 mg capsule	90	30	Tc, Reba	[id number]	Medicare	[facility]  B	N	N		07/21/2023	07/22/2023	pregabalin 100 mg capsule	42	14	Tc, Reba	[id number]	Medicare	[facility]  B	N	N	O	07/16/2023	07/16/2023	oxycodone hcl (ir) 15 mg tab	30	7	Tc, Reba	[id number]	Medicare	[facility]  B	N	N	O	06/30/2023	07/01/2023	oxycodone hcl (ir) 15 mg tab	42	10	Tc, Reba	[id number]	Medicare	[facility]  B	N	N		06/19/2023	06/20/2023	pregabalin 100 mg capsule	90	30	Tc, Reba	[id number]	Medicare	[facility]  B	N	N	O	06/06/2023	06/07/2023	oxycodone hcl (ir) 15 mg tab	42	7	Tc, Reba	[id number]	Medicare	[facility]  B	N	N	O	05/22/2023	05/23/2023	oxycodone hcl (ir) 15 mg tab	42	10	Tc, Reba	[id number]	Medicare	[facility]  B	N	N		05/19/2023	05/20/2023	pregabalin 100 mg capsule	90	30	Tc, Reba	[id number]	Medicare	[facility]  C	N	N	O	11/28/2023	11/30/2023	oxycodone hcl (ir) 20 mg tab	60	30	Vinny Maldonado MD	[id number]	Insurance	[id number]  C	N	N	O	11/16/2023	11/18/2023	oxycodone-acetaminophen  mg tab	30	15	Vinny Maldonado MD	[id number]	Insurance	[id number]  C	N	N		11/16/2023	11/18/2023	pregabalin 100 mg capsule	90	30	Vinny Maldonado MD	[id number]	Insurance	[id number]  C	N	N		09/28/2023	11/01/2023	pregabalin 100 mg capsule	30	10	Tc, Reba	[id number]	Insurance	[id number]  D	N	N	O	12/29/2023	12/29/2023	oxycodone hcl (ir) 15 mg tab	12	3	Christopher Montiel	[id number]	Insurance	[facility]  D	N	N		12/29/2023	12/29/2023	butalbital-acetaminophen-caffeine -40 mg tablet	53	8	Christopher Montiel	[id number]	Insurance	[facility]	N	N	O	10/12/2023	10/14/2023	oxycodone hcl (ir) 15 mg tab	28	7	Naomi Ybarra	[id number]	Insurance	[id number]  E	N	N		02/08/2024	02/09/2024	pregabalin 100 mg capsule	42	7	Alba Jasso M	[id number]	Insurance	[facility]  E	N	N	O	02/08/2024	02/09/2024	hydromorphone 4 mg tablet	20	5	Alba Jasso M	[id number]	[facility]  E	N	N		01/25/2024	01/26/2024	pregabalin 100 mg capsule	90	30	Vinny Maldonado MD	[id number]	[facility]	N	N	O	01/25/2024	01/26/2024	oxycodone hcl (ir) 20 mg tab	60	30	Vinny Maldonado MD	[id number]	[facility]	N	Y		04/03/2024	05/06/2024	pregabalin 100 mg capsule	90	30	Vinny Maldonado MD	[id number]	[facility]	N	Y	O	04/30/2024	05/02/2024	oxycodone hcl (ir) 20 mg tab	60	30	Vinny Maldonado MD	[id number]	[facility]	N	N	O	04/16/2024	04/17/2024	oxycodone hcl (ir) 5 mg tablet	10	3	Frank Ba	[id number]	[facility]	N	N		04/03/2024	04/04/2024	pregabalin 100 mg capsule	90	30	Vinny Maldonado MD	[id number]	[facility]	N	N	O	03/04/2024	03/05/2024	hydromorphone 4 mg tablet	20	5	Bonny Bryant	[id number]	[facility]  G	N	N		09/28/2023	10/02/2023	pregabalin 100 mg capsule	30	10	Reba Montez	[id number]	[id number]    * - Details of Drug Type : O = Opioid, B = Benzodiazepine, S = Stimulant    * - Drugs marked with an asterisk are compound drugs. If the compound drug is made up of more than one controlled substance, then each controlled substance will be a separate row in the table.

## 2024-05-08 NOTE — CONSULT NOTE ADULT - PROBLEM SELECTOR RECOMMENDATION 9
Pt with chronic right flank pain which is somatic and neuropathic in nature due to history of complex regional pain syndrome and right intercostal neuralgia. Since her diagnosis she has trialed numerous medications/interventions without relief, including (amitriptyline and duloxetine which caused patti, max dose of gabapentin, and epidural spinal injections). She was recommended a spinal cord stimulator which was placed in March 2023, however she reports that it is not effective in managing her symptoms. She does not currently have an outpatient pain management provider and has been relying on her PCP to prescribe opioids to manage her pain. Recommend neurology consult.   Opioid pain recommendations   - Give Dilaudid 1mg IVP STAT once.   - Start Oxycodone 10 mg PO q 4 hours PRN severe pain. Monitor for sedation/ respiratory depression. (Patient was taking as outpatient)  Non-opioid pain recommendations   - Start Tizanidine 4mg q 8hrs. Monitor for sedation (home med)  - Start Acetaminophen 1 gram PO q 8 hours x 3 days. Monitor LFTs  - Start Lyrica 100mg q 8hrs. (home med)  - Start Lidoderm 4% patch daily. x 3 (12 hrs on/12 hrs off)  Bowel Regimen  - Continue Miralax 17G PO daily  - Continue Senna 2 tablets at bedtime for constipation  Mild pain (score 1-3)  - Non-pharmacological pain treatment recommendations  - Warm/ Cool packs PRN   - Repositioning extremity, elevation, imagery, relaxation, distraction.  - Physical therapy OOB if no contraindications   Recommendations discussed with primary team and RN

## 2024-05-08 NOTE — CHART NOTE - NSCHARTNOTEFT_GEN_A_CORE
CT maxillofacial:  Soft tissue swelling with air containing collection overlying the right malar body and the premaxillary space consistent with an air-containing abscess     Attempted to reach out to transfer center and speak with ENT, however no response. Call back number left by Dr. Yang, awaiting call back. Results discussed with pt at bedside and pt agreeable to transfer to another facility for ENT eval if needed. Transfer paperwork started in chart. Will sign-out to night team and follow up tomorrow morning for transfer if needed.     Case discussed with Attending Dr. Yang. CT maxillofacial:  Soft tissue swelling with air containing collection overlying the right malar body and the premaxillary space consistent with an air-containing abscess     Attempted to reach out to transfer center and speak with ENT, however no response. Call back number left by Dr. Yang, awaiting call back. Results discussed with pt at bedside and pt agreeable to transfer to another facility for ENT eval if needed. Transfer paperwork started in chart. Will sign-out to night team and follow up tomorrow morning for transfer if needed.     Case discussed with Attending Dr. Yang.  ______________  Addendum: Surgery team called back and would like IR team in [facility] to see if they can drain the abscess. IR to be consulted in AM.

## 2024-05-08 NOTE — ED ADULT NURSE NOTE - NSFALLHARMRISKINTERV_ED_ALL_ED

## 2024-05-08 NOTE — ED PROVIDER NOTE - PHYSICAL EXAMINATION
Gen: obese female well appearing  ENT: mucous membranes moist, no discharge  Resp: CTAB, no W/R/R  CV: RRR, +S1/S2, no M/R/G  GI: Abdomen soft non-distended, NTTP, no masses  MSK: No open wounds, no bruising, no lower extremity edema  Ext: no edema, no deformity, warm and well-perfused  Skin: no rash or bruising

## 2024-05-08 NOTE — PATIENT PROFILE ADULT - FUNCTIONAL ASSESSMENT - BASIC MOBILITY 6.
3-calculated by average/Not able to assess (calculate score using [facility] averaging method)

## 2024-05-08 NOTE — ED ADULT NURSE NOTE - NSFALLRISKFACTORS_ED_ALL_ED
Facial Drainage April 15/Surgery: Recent surgery, recent lower limb amputation, major abdominal or thoracic surgery

## 2024-05-08 NOTE — H&P ADULT - PROBLEM SELECTOR PLAN 3
WBC 12 on admission  Likely 2/2 UTI vs facial abscess  pt denies dysuria  - C/w Ceftriaxone  - F/u UCx WBC 12 on admission  Likely 2/2 UTI vs facial abscess  pt denies dysuria, UA weakly positive, will hold off on treating for now  - F/u CT Maxilla

## 2024-05-08 NOTE — H&P ADULT - PROBLEM SELECTOR PLAN 2
s/p lipoma resection 4/15 and [facility]. Checked up on last week, had needle placed in cheek, no further drainage  mild fever at home, now with slightly increased swelling  - F/u CTH / maxillary face eval for abscess  - Will consider starting abx

## 2024-05-08 NOTE — PATIENT PROFILE ADULT - FALL HARM RISK - HARM RISK INTERVENTIONS
Assistance with ambulation/Assistance OOB with selected safe patient handling equipment/Communicate Risk of Fall with Harm to all staff/Discuss with provider need for PT consult/Monitor gait and stability/Provide patient with walking aids - walker, cane, crutches/Reinforce activity limits and safety measures with patient and family/Sit up slowly, dangle for a short time, stand at bedside before walking/Tailored Fall Risk Interventions/Use of alarms - bed, chair and/or voice tab/Visual Cue: Yellow wristband and red socks/Bed in lowest position, wheels locked, appropriate side rails in place/Call bell, personal items and telephone in reach/Instruct patient to call for assistance before getting out of bed or chair/Non-slip footwear when patient is out of bed/[place] to call system/Physically safe environment - no spills, clutter or unnecessary equipment/Purposeful Proactive Rounding/Room/bathroom lighting operational, light cord in reach

## 2024-05-08 NOTE — H&P ADULT - PROBLEM SELECTOR PLAN 4
Pt has a history of DM, takes Tresiba 54u qhs and 12u Novolog TID and Metformin 1000mg BID at home  - C/w mod ISS  - C/w Lantus 25u qhs  FS ACHS

## 2024-05-08 NOTE — H&P ADULT - PROBLEM SELECTOR PLAN 5
Pt has a history of CHF, takes Bumex 2mg daily and coreg 3.125mg BID and lisinopril 5mg daily  - C/w Home meds

## 2024-05-08 NOTE — H&P ADULT - HISTORY OF PRESENT ILLNESS
This is a 53 y/o F, from home, lives alone, ambulates with cane/walker/wheelchair, with PMHx of DM, HTN, HLD, CHF, complex regional pain syndrome of right back intercostal muscles s/p spinal cord stimulator (Mar 2023) who presents with right flank pain. Pt states she is having a flare up of her complex regional pain syndrome that started 2 days ago. Pt mentions she ran out of her pain medications and has been taking up to six 800mg Ibuprofen daily. Pt does not follow with a pain specialist currently. Pt also states she had a recent lipoma resection from her right cheek (4/15/2024) after which she completed a course of antibiotics. Pt states she was having drainage and went to see her plastic surgeon on May 2nd, after which they placed a needle in the cheek and drained out fluid. Pt states that since that time she has not had any further fluid drain. Pt mentions she now has pain and slightly increased swelling in the cheek area. Pt mentions she had a 100.6 fever this morning and felt chills. Pt denies any recent travel, recent illness, dysuria, CP, SOB, N/V/D, or constipation. Helical Rim Advancement Flap Text: The defect edges were debeveled with a #15 blade scalpel.  Given the location of the defect and the proximity to free margins (helical rim) a double helical rim advancement flap was deemed most appropriate. Using a sterile surgical marker, the appropriate advancement flaps were drawn incorporating the defect and placing the expected incisions between the helical rim and antihelix where possible.  The area thus outlined was incised through and through with a #15 scalpel blade.  With a skin hook and iris scissors, the flaps were gently and sharply undermined and freed up. Folllowing this, the designed flaps were carried over into the primary defect and sutured into place.

## 2024-05-08 NOTE — H&P ADULT - PROBLEM SELECTOR PLAN 1
Pt has a history of CRPS type 1, diagnosed 8 yrs ago. Used to follow with pain management however left due to complications with physician, has not be able to find another pain doctor since  Presents with right flank pain, radiating in the back area. Pt states this is day 3 of her flare up  s/p ibuprofen at home and morphine in ED  CT A/P non-obstructive uropathy  - F/u Pain management recs  - C/w Pregabalin  Pain management Consulted

## 2024-05-08 NOTE — PATIENT PROFILE ADULT - NSPROPATIENTLACTATING_GEN_A_NUR
Patient is a 36 year old, male; domiciled with family; single; no known dependents; unemployed on disability; PPH of schizoaffective vs bipolar with psychosis; multiple inpatient psychiatric hospitalizations (last discharged from [facility] ~mid-April, given Risperdal Consta); no known suicide attempts; h/o physically aggressive; active cannabis abuse and h/o ETOH abuse, no known history of complicated withdrawal; PMH childhood asthma; brought in by EMS; called by parents; for bizarre behavior and agitation in the context of noncompliance with treatment.     Patient presents with symptoms consistent with psychosis in the setting of medication noncompliance, substance use (Utox + THC), and psychosocial stressors (family, housing). Patient deny active SI/HI. Patient does not appear acutely manic, intoxicated, or withdrawing from substances. Patient was disorganized with flat affect and possibly delusional and paranoid about his mom. Additionally, per chart, patient was agitated with disorganized behaviors at home and in ED. Patient requires involuntary inpatient psychiatric admission for safety, stabilization, and treatment.     Ddx: schizophrenia, substance-induced psychotic disorder, schizoaffective, PTSD    5/16  Today, patient is in good behavioral control.  Happy he can return home.   Will set up outpatient psychiatry follow up.      Plan  1. Legal: Admitted on 9.27.   2. Safety: No reported SI/SIB/HI/VI currently on unit; continue routine observation.   -psychotropic PRN medications for safety: Haldol 5mg PO q6h prn agitation  3. Psychiatric:   2 loading doses of Invega Sustenna completed  Paliperidone Sustenna 234 mg 4/28   Paliperidone Sustenna 156mg 5/2/23   -c/w depakote 1000mg qhs for mood stabilization  -c/w PRN 2mg nicotine gum q2h for nicotine cravings  4. Therapy: group & milieu therapy  5. Medical: neck pain  -c/w PRN tylenol q6h for neck pain  -c/w heat/ ice pack as requested for neck pain  -c/w Ensure supplement  4/27 Prolactin 37.9, repeat prolactin 5/2 45.7 - patient refused medications (cabergoline, abilify) to decrease prolactin.  6. Collateral: Collateral from family  7. Disposition: When stable       no

## 2024-05-08 NOTE — H&P ADULT - ATTENDING COMMENTS
Patient seen and examined. Case discussed with housestaff. In brief, this is a 53 yo F with complex regional pain syndrome of the right back intercostal muscles s/p spinal cord stimulator (3/23), DM2, HTN, HLD, HFpEF who presents with a flare of her complex regional pain syndrome in the right flank. She has been experiencing symptoms for the past 2-3 days when she ran out of her oxycodone medication from her prior hospitalization. Since then, she has been taking a significant amount of ibuprofen (800mg 6x/day) without any effect. She has been unable to follow-up with a pain specialist as she is reportedly looking for one that specializes in her condition but to no avail. As her pain could not be controlled, she decided to come to the ED. Also of note, the patient had a lipoma resected from her right cheek on 4/15/24 by Dr. Frank Ba after which she completed a course of antibiotics. She then developed drainage and went to see her plastic surgeon on 5/2/24 where they placed a needle to help drain out the fluid. Since then, she feels the pain and swelling to her cheek area have worsened and she had a fever to 100.6 prior to admission.     With respect to the complex regional pain syndrome, will consult the pain management team. Recommendations appreciated, will continue oxycodone IR as needed for pain. Have spoken with Konstantin regarding the patient who reports she will assist in trying to help the patient get a doctor to follow with. Will monitor pain response and adjust medications as needed. With respect to the facial swelling, pain, and fever, will check CT maxillofacial to assess for possible underlying abscess. If positive, will reach out to patient's surgeon, Dr. Ba for possible transfer back to [facility] for further management. Will also start Unasyn if positive and consult ID. Remaining care as noted above. Patient seen and examined. Case discussed with housestaff. In brief, this is a 55 yo F with complex regional pain syndrome of the right back intercostal muscles s/p spinal cord stimulator (3/23), DM2, HTN, HLD, HFpEF who presents with a flare of her complex regional pain syndrome in the right flank. She has been experiencing symptoms for the past 2-3 days when she ran out of her oxycodone medication from her prior hospitalization. Since then, she has been taking a significant amount of ibuprofen (800mg 6x/day) without any effect. She has been unable to follow-up with a pain specialist as she is reportedly looking for one that specializes in her condition but to no avail. As her pain could not be controlled, she decided to come to the ED. Also of note, the patient had a lipoma resected from her right cheek on 4/15/24 by Dr. Frank Ba after which she completed a course of antibiotics. She then developed drainage and went to see her plastic surgeon on 5/2/24 where they placed a needle to help drain out the fluid. Since then, she feels the pain and swelling to her cheek area have worsened and she had a fever to 100.6 prior to admission.     With respect to the complex regional pain syndrome, will consult the pain management team. Recommendations appreciated, will continue oxycodone IR as needed for pain. Have spoken with Konstantin regarding the patient who reports she will assist in trying to help the patient get a doctor to follow with. Will monitor pain response and adjust medications as needed. With respect to the facial swelling, pain, and fever, will check CT maxillofacial to assess for possible underlying abscess. If positive, will reach out to patient's surgeon, Dr. Ba for possible transfer back to [facility] for further management. Will also start Unasyn if positive and consult ID. Remaining care as noted above in resident note.

## 2024-05-08 NOTE — H&P ADULT - ASSESSMENT
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 Admitted for intractable pain management.

## 2024-05-08 NOTE — CONSULT NOTE ADULT - SUBJECTIVE AND OBJECTIVE BOX
Source of information: ROBIN LÓPEZ, Chart review  Patient language: English  : n/a    HPI:  This is a 53 y/o F, from home, lives alone, ambulates with cane/walker/wheelchair, with PMHx of DM, HTN, HLD, CHF, complex regional pain syndrome of right back intercostal muscles s/p spinal cord stimulator (Mar 2023) who presents with right flank pain. Pt states she is having a flare up of her complex regional pain syndrome that started 2 days ago. Pt mentions she ran out of her pain medications and has been taking up to six 800mg Ibuprofen daily. Pt does not follow with a pain specialist currently. Pt also states she had a recent lipoma resection from her right cheek (4/15/2024) after which she completed a course of antibiotics. Pt states she was having drainage and went to see her plastic surgeon on May 2nd, after which they placed a needle in the cheek and drained out fluid. Pt states that since that time she has not had any further fluid drain. Pt mentions she now has pain and slightly increased swelling in the cheek area. Pt mentions she had a 100.6 fever this morning and felt chills. Pt denies any recent travel, recent illness, dysuria, CP, SOB, N/V/D, or constipation. (08 May 2024 09:45)    Pt is admitted for intractable right flank pain. Pain service consulted on  for management of right flank pain. Pt seen and examined while sitting on ED stretcher, this morning. Per patient, she was diagnosed with complex regional pain syndrome and right sided intercostal neuralgia in . Since her diagnosis she has trialed numerous medications/interventions without relief, including (amitriptyline and duloxetine which caused patti, max dose of gabapentin, and epidural spinal injections). She was recommended a spinal cord stimulator which was placed in 2023, however she reports that it is not effective in managing her symptoms. She does not currently have an outpatient pain management provider and has been relying on her PCP to prescribe opioids to manage her pain. At time of assessment, patient grimacing in pain, reports pain score 7/10 SCALE USED: (1-10 VNRS). Pt describes pain as localized to right flank with radiation to right upper and lower back, constant, sharp, burning, and aching in quality. The pain is mildly alleviated with current regimen and is exacerbated by movement and taking deep inhalations. Pt tolerating PO diet. Denies lethargy, chest pain, SOB, nausea, vomiting. She reports history of constipation. Last BM . Patient stated goal for pain control: to be able to take deep breaths, get out of bed to chair and ambulate with tolerable pain control. Patient ambulates with rollator/straight cane at baseline and uses wheelchair for long distances. Per patient, her most recent pain regimen includes xanaflex 4mg TID, Lyrica 100mg TID, and oxycodone 5-10mg.    PAST MEDICAL & SURGICAL HISTORY:  HTN (hypertension)    Neuropathy    Obesity    Former cigarette smoker  (smoked x 45 years; quit ~)    Marijuana smoker, continuous  (states smokes 3 - 4 times per day for pain management reasons)    Neuralgia  (pt states hx/o "intercostal neuralgia")    Diabetes    Essential hypertension    IBS (irritable bowel syndrome)    Complex regional pain syndrome type 1    DM2 (diabetes mellitus, type 2)    Intercostal neuralgia    Localized swelling, mass and lump, head    Asthma    History of CHF (congestive heart failure)    Fibroids    Idiopathic intracranial hypertension    History of cholecystectomy    History of hand surgery  (pt states she had 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 1000 milliGRAM(s) Oral every 8 hours  atorvastatin 20 milliGRAM(s) Oral at bedtime  buMETAnide 2 milliGRAM(s) Oral daily  carvedilol 3.125 milliGRAM(s) Oral every 12 hours  enoxaparin Injectable 60 milliGRAM(s) SubCutaneous every 12 hours  insulin glargine Injectable (LANTUS) 25 Unit(s) SubCutaneous at bedtime  insulin lispro (ADMELOG) corrective regimen sliding scale   SubCutaneous three times a day before meals  insulin lispro (ADMELOG) corrective regimen sliding scale   SubCutaneous at bedtime  insulin lispro Injectable (ADMELOG) 4 Unit(s) SubCutaneous three times a day before meals  lidocaine   4% Patch 3 Patch Transdermal daily  montelukast 10 milliGRAM(s) Oral at bedtime  pantoprazole    Tablet 40 milliGRAM(s) Oral before breakfast  pregabalin 100 milliGRAM(s) Oral three times a day  tiZANidine 4 milliGRAM(s) Oral every 8 hours    MEDICATIONS  (PRN):  acetaminophen     Tablet .. 650 milliGRAM(s) Oral every 6 hours PRN Temp greater or equal to 38C (100.4F), Mild Pain (1 - 3)  aluminum hydroxide/magnesium hydroxide/simethicone Suspension 30 milliLiter(s) Oral every 4 hours PRN Dyspepsia  melatonin 3 milliGRAM(s) Oral at bedtime PRN Insomnia  ondansetron Injectable 4 milliGRAM(s) IV Push every 8 hours PRN Nausea and/or Vomiting  oxyCODONE    IR 10 milliGRAM(s) Oral every 4 hours PRN Severe Pain (7 - 10)    Vital Signs Last 24 Hrs  T(C): 36.6 (08 May 2024 10:45), Max: 37.6 (07 May 2024 22:49)  T(F): 97.9 (08 May 2024 10:45), Max: 99.7 (07 May 2024 22:49)  HR: 69 (08 May 2024 10:45) (63 - 77)  BP: 151/85 (08 May 2024 10:45) (132/70 - 151/85)  BP(mean): --  RR: 18 (08 May 2024 10:45) (16 - 18)  SpO2: 97% (08 May 2024 10:45) (95% - 98%)    Parameters below as of 08 May 2024 10:45  Patient On (Oxygen Delivery Method): room air    LABS: Reviewed.                          12.1   12.30 )-----------( 449      ( 07 May 2024 23:36 )             37.0     05-07    140  |  102  |  14  ----------------------------<  188<H>  2.9<LL>   |  30  |  1.00    Ca    7.3<L>      07 May 2024 23:36    TPro  7.6  /  Alb  3.1<L>  /  TBili  0.3  /  DBili  x   /  AST  19  /  ALT  28  /  AlkPhos  140<H>  05-07    LIVER FUNCTIONS - ( 07 May 2024 23:36 )  Alb: 3.1 g/dL / Pro: 7.6 g/dL / ALK PHOS: 140 U/L / ALT: 28 U/L DA / AST: 19 U/L / GGT: x           Urinalysis Basic - ( 08 May 2024 04:47 )    Color: Yellow / Appearance: Cloudy / S.027 / pH: x  Gluc: x / Ketone: Trace mg/dL  / Bili: Negative / Urobili: 1.0 mg/dL   Blood: x / Protein: Trace mg/dL / Nitrite: Negative   Leuk Esterase: Moderate / RBC: 2 /HPF / WBC 15 /HPF   Sq Epi: x / Non Sq Epi: x / Bacteria: Few /HPF    CAPILLARY BLOOD GLUCOSE    SARS-CoV-2: NotDetec (25 Mar 2024 15:31)  SARS-CoV-2: NotDetec (08 Dec 2023 01:28)    Radiology: Reviewed.     ORT Score -   Family Hx of substance abuse	Female	      Male  Alcohol 	                                           1                     3  Illegal drugs	                                   2                     3  Rx drugs                                           4 	                  4  Personal Hx of substance abuse		  Alcohol 	                                          3	                  3  Illegal drugs                                     4	                  4  Rx drugs                                            5 	                  5  Age between 16- 45 years	           1                     1  hx preadolescent sexual abuse	   3 	                  0  Psychological disease		  ADD, OCD, bipolar, schizophrenia   2	          2  Depression                                           1 	          1  Total: 0    a score of 3 or lower indicates low risk for opioid abuse		  a score of 4-7 indicates moderate risk for opioid abuse		  a score of 8 or higher indicates high risk for opioid abuse  	  REVIEW OF SYSTEMS:  CONSTITUTIONAL: No fever or fatigue  HEENT:  No difficulty hearing, no change in vision  NECK: No pain or stiffness  RESPIRATORY: No cough, wheezing, chills or hemoptysis; No shortness of breath  CARDIOVASCULAR: No chest pain, palpitations, dizziness, or leg swelling  GASTROINTESTINAL: No loss of appetite, decreased PO intake. No abdominal or epigastric pain. No nausea, vomiting; No diarrhea or constipation.   GENITOURINARY: No dysuria, frequency, hematuria, retention or incontinence  MUSCULOSKELETAL: + right flank pain; + right upper and lower back pain, no upper or lower motor strength weakness, no saddle anesthesia, bowel/bladder incontinence, no falls   NEURO: No headaches, No numbness/tingling b/l LE, No weakness  ENDOCRINE: No polyuria, polydipsia, heat or cold intolerance; No hair loss  PSYCHIATRIC: No depression, anxiety or difficulty sleeping    PHYSICAL EXAM:  GENERAL:  Alert & Oriented X4, cooperative, NAD, Good concentration. Speech is clear.   RESPIRATORY: Respirations even and unlabored. Clear to auscultation bilaterally; No rales, rhonchi, wheezing, or rubs  CARDIOVASCULAR: Normal S1/S2, regular rate and rhythm; No murmurs, rubs, or gallops. No JVD.   GASTROINTESTINAL:  Soft, Nontender, Nondistended; Bowel sounds present  PERIPHERAL VASCULAR:  Extremities warm without edema. 2+ Peripheral Pulses, No cyanosis, No calf tenderness  MUSCULOSKELETAL: Motor Strength 5/5 B/L upper and lower extremities; moves all extremities equally against gravity; ROM intact; negative SLR; No tenderness on palpation of all joints.   SKIN: Warm, dry, intact. No rashes, lesions, scars or wounds.     Risk factors associated with adverse outcomes related to opioid treatment  [ ]  Concurrent benzodiazepine use  [ ]  History/ Active substance use or alcohol use disorder  [ ] Psychiatric co-morbidity  [ ] Sleep apnea  [ ] COPD  [ ] BMI> 35  [ ] Liver dysfunction  [ ] Renal dysfunction  [ ] CHF  [ ] Smoker  [ ]  Age > 60 years    [ ]  [place]  Reviewed and Copied to Chart. See below.    Plan of care and goal oriented pain management treatment options were discussed with patient and /or primary care giver; all questions and concerns were addressed and care was aligned with patient's wishes.    Educated patient on goal oriented pain management treatment options      Source of information: ROBIN LÓPEZ, Chart review  Patient language: English  : n/a    HPI:  This is a 55 y/o F, from home, lives alone, ambulates with cane/walker/wheelchair, with PMHx of DM, HTN, HLD, CHF, complex regional pain syndrome of right back intercostal muscles s/p spinal cord stimulator (Mar 2023) who presents with right flank pain. Pt states she is having a flare up of her complex regional pain syndrome that started 2 days ago. Pt mentions she ran out of her pain medications and has been taking up to six 800mg Ibuprofen daily. Pt does not follow with a pain specialist currently. Pt also states she had a recent lipoma resection from her right cheek (4/15/2024) after which she completed a course of antibiotics. Pt states she was having drainage and went to see her plastic surgeon on May 2nd, after which they placed a needle in the cheek and drained out fluid. Pt states that since that time she has not had any further fluid drain. Pt mentions she now has pain and slightly increased swelling in the cheek area. Pt mentions she had a 100.6 fever this morning and felt chills. Pt denies any recent travel, recent illness, dysuria, CP, SOB, N/V/D, or constipation. (08 May 2024 09:45)    Pt is admitted for intractable right flank pain. Pain service consulted on  for management of right flank pain. Pt seen and examined while sitting on ED stretcher, this morning. Per patient, she was diagnosed with complex regional pain syndrome and right sided intercostal neuralgia in . Since her diagnosis she has trialed numerous medications/interventions without relief, including (amitriptyline and duloxetine which caused patti, max dose of gabapentin, and epidural spinal injections). She was recommended a spinal cord stimulator which was placed in 2023, however she reports that it is not effective in managing her symptoms. She does not currently have an outpatient pain management provider and has been relying on her PCP to prescribe opioids to manage her pain. At time of assessment, patient grimacing in pain, reports pain score 7/10 SCALE USED: (1-10 VNRS). Pt describes pain as localized to right flank with radiation to right upper and lower back, constant, sharp, burning, and aching in quality. The pain is mildly alleviated with current regimen and is exacerbated by movement and taking deep inhalations. Pt tolerating PO diet. Denies lethargy, chest pain, SOB, nausea, vomiting. She reports history of constipation. Last BM . Patient stated goal for pain control: to be able to take deep breaths, get out of bed to chair and ambulate with tolerable pain control. Patient ambulates with rollator/straight cane at baseline and uses wheelchair for long distances. Per patient, her most recent pain regimen includes xanaflex 4mg TID, Lyrica 100mg TID, and oxycodone 5-10mg.    PAST MEDICAL & SURGICAL HISTORY:  HTN (hypertension)    Neuropathy    Obesity    Former cigarette smoker  (smoked x 45 years; quit ~)    Marijuana smoker, continuous  (states smokes 3 - 4 times per day for pain management reasons)    Neuralgia  (pt states hx/o "intercostal neuralgia")    Diabetes    Essential hypertension    IBS (irritable bowel syndrome)    Complex regional pain syndrome type 1    DM2 (diabetes mellitus, type 2)    Intercostal neuralgia    Localized swelling, mass and lump, head    Asthma    History of CHF (congestive heart failure)    Fibroids    Idiopathic intracranial hypertension    History of cholecystectomy    History of hand surgery  (pt states she had 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 1000 milliGRAM(s) Oral every 8 hours  atorvastatin 20 milliGRAM(s) Oral at bedtime  buMETAnide 2 milliGRAM(s) Oral daily  carvedilol 3.125 milliGRAM(s) Oral every 12 hours  enoxaparin Injectable 60 milliGRAM(s) SubCutaneous every 12 hours  insulin glargine Injectable (LANTUS) 25 Unit(s) SubCutaneous at bedtime  insulin lispro (ADMELOG) corrective regimen sliding scale   SubCutaneous three times a day before meals  insulin lispro (ADMELOG) corrective regimen sliding scale   SubCutaneous at bedtime  insulin lispro Injectable (ADMELOG) 4 Unit(s) SubCutaneous three times a day before meals  lidocaine   4% Patch 3 Patch Transdermal daily  montelukast 10 milliGRAM(s) Oral at bedtime  pantoprazole    Tablet 40 milliGRAM(s) Oral before breakfast  pregabalin 100 milliGRAM(s) Oral three times a day  tiZANidine 4 milliGRAM(s) Oral every 8 hours    MEDICATIONS  (PRN):  acetaminophen     Tablet .. 650 milliGRAM(s) Oral every 6 hours PRN Temp greater or equal to 38C (100.4F), Mild Pain (1 - 3)  aluminum hydroxide/magnesium hydroxide/simethicone Suspension 30 milliLiter(s) Oral every 4 hours PRN Dyspepsia  melatonin 3 milliGRAM(s) Oral at bedtime PRN Insomnia  ondansetron Injectable 4 milliGRAM(s) IV Push every 8 hours PRN Nausea and/or Vomiting  oxyCODONE    IR 10 milliGRAM(s) Oral every 4 hours PRN Severe Pain (7 - 10)    Vital Signs Last 24 Hrs  T(C): 36.6 (08 May 2024 10:45), Max: 37.6 (07 May 2024 22:49)  T(F): 97.9 (08 May 2024 10:45), Max: 99.7 (07 May 2024 22:49)  HR: 69 (08 May 2024 10:45) (63 - 77)  BP: 151/85 (08 May 2024 10:45) (132/70 - 151/85)  BP(mean): --  RR: 18 (08 May 2024 10:45) (16 - 18)  SpO2: 97% (08 May 2024 10:45) (95% - 98%)    Parameters below as of 08 May 2024 10:45  Patient On (Oxygen Delivery Method): room air    LABS: Reviewed.                          12.1   12.30 )-----------( 449      ( 07 May 2024 23:36 )             37.0     05-07    140  |  102  |  14  ----------------------------<  188<H>  2.9<LL>   |  30  |  1.00    Ca    7.3<L>      07 May 2024 23:36    TPro  7.6  /  Alb  3.1<L>  /  TBili  0.3  /  DBili  x   /  AST  19  /  ALT  28  /  AlkPhos  140<H>      LIVER FUNCTIONS - ( 07 May 2024 23:36 )  Alb: 3.1 g/dL / Pro: 7.6 g/dL / ALK PHOS: 140 U/L / ALT: 28 U/L DA / AST: 19 U/L / GGT: x           Urinalysis Basic - ( 08 May 2024 04:47 )    Color: Yellow / Appearance: Cloudy / S.027 / pH: x  Gluc: x / Ketone: Trace mg/dL  / Bili: Negative / Urobili: 1.0 mg/dL   Blood: x / Protein: Trace mg/dL / Nitrite: Negative   Leuk Esterase: Moderate / RBC: 2 /HPF / WBC 15 /HPF   Sq Epi: x / Non Sq Epi: x / Bacteria: Few /HPF    CAPILLARY BLOOD GLUCOSE    SARS-CoV-2: NotDetec (25 Mar 2024 15:31)  SARS-CoV-2: NotDetec (08 Dec 2023 01:28)    Radiology: Reviewed.   < from: CT Abdomen and Pelvis No Cont (24 @ 06:31) >    ACC: [id number] EXAM:  CT ABDOMEN AND PELVIS   ORDERED BY: TRACE ROYAL     PROCEDURE DATE:  2024      INTERPRETATION:  CLINICAL INFORMATION: Right flank pain    COMPARISON: CT abdomen pelvis 2023    CONTRAST/COMPLICATIONS:  IV Contrast: None  Oral Contrast: None  Complications: None    PROCEDURE:  CT of the Abdomen and Pelvis was performed.  Sagittal and coronal reformats were performed.    FINDINGS:  LOWER CHEST: Aortic root, coronary artery, mitral annular calcifications.   Linear subsegmental atelectasis in the lingula.    LIVER: Within normal limits.  BILE DUCTS: Normal caliber.  GALLBLADDER: Cholecystectomy.  SPLEEN: Within normal limits.  PANCREAS: Within normal limits.  ADRENALS: Within normal limits.  KIDNEYS/URETERS: Punctate nonobstructing stone in the interpolar region   of the right kidney. No hydronephrosis or perinephric inflammation.    BLADDER: Within normal limits.  REPRODUCTIVE ORGANS: Intrauterine device. No gross adnexal masses.    BOWEL: Duodenal diverticulum. No bowel obstruction. Appendix is normal.   Moderate stool in the rectosigmoid colon. No bowel wall thickening or   pericolonic inflammatory change.  PERITONEUM: No ascites.  VESSELS: Mild atherosclerotic changes.  RETROPERITONEUM/LYMPH NODES: No lymphadenopathy.  ABDOMINAL WALL: Small fat-containing umbilical hernia. Spinal stimulator   generator in the right flank subcutaneous soft tissues. Mild diffuse   anasarca.  BONES: Degenerative changes. Spinal stimulator electrodes.    IMPRESSION:  No evidence for obstructive uropathy.    --- End of Report ---    MILE COSTELLO MD; Attending Radiologist  This document has been electronically signed. May  8 2024  6:54AM    < end of copied text >    ORT Score -   Family Hx of substance abuse	Female	      Male  Alcohol 	                                           1                     3  Illegal drugs	                                   2                     3  Rx drugs                                           4 	                  4  Personal Hx of substance abuse		  Alcohol 	                                          3	                  3  Illegal drugs                                     4	                  4  Rx drugs                                            5 	                  5  Age between 16- 45 years	           1                     1  hx preadolescent sexual abuse	   3 	                  0  Psychological disease		  ADD, OCD, bipolar, schizophrenia   2	          2  Depression                                           1 	          1  Total: 0    a score of 3 or lower indicates low risk for opioid abuse		  a score of 4-7 indicates moderate risk for opioid abuse		  a score of 8 or higher indicates high risk for opioid abuse  	  REVIEW OF SYSTEMS:  CONSTITUTIONAL: No fever or fatigue  HEENT:  No difficulty hearing, no change in vision  NECK: No pain or stiffness  RESPIRATORY: No cough, wheezing, chills or hemoptysis; No shortness of breath  CARDIOVASCULAR: No chest pain, palpitations, dizziness, or leg swelling  GASTROINTESTINAL: No loss of appetite, decreased PO intake. No abdominal or epigastric pain. No nausea, vomiting; No diarrhea or constipation.   GENITOURINARY: No dysuria, frequency, hematuria, retention or incontinence  MUSCULOSKELETAL: + right flank pain; + right upper and lower back pain, chronic upper or lower motor strength weakness due to pain, no saddle anesthesia, bowel/bladder incontinence, no falls   NEURO: No headaches, No numbness/tingling b/l LE, No weakness  ENDOCRINE: No polyuria, polydipsia, heat or cold intolerance; No hair loss  PSYCHIATRIC: No depression, anxiety or difficulty sleeping    PHYSICAL EXAM:  GENERAL:  Alert & Oriented X4, cooperative, NAD, Good concentration. Speech is clear, (right facial droop, since 2024 after surgical removal of right cheek mass.)  RESPIRATORY: Respirations even and unlabored. Clear to auscultation bilaterally  CARDIOVASCULAR: Normal S1/S2, regular rate and rhythm  GASTROINTESTINAL:  Soft, obese per BMI, right flank tenderness, bowel sounds present  PERIPHERAL VASCULAR: Extremities warm without edema. 2+ Peripheral Pulses, No cyanosis, No calf tenderness  MUSCULOSKELETAL: Motor Strength 4/5 B/L upper and lower extremities; moves all extremities equally against gravity; + right upper and lower back tenderness  SKIN: Warm, dry, intact, spinal cord stimulator battery palpable to right low back, mid back scar from spinal cord stimulator insertion    Risk factors associated with adverse outcomes related to opioid treatment  [ ] Concurrent benzodiazepine use  [ ] History/ Active substance use or alcohol use disorder  [ ] Psychiatric co-morbidity  [ ] Sleep apnea  [ ] COPD  [x] BMI> 35  [ ] Liver dysfunction  [ ] Renal dysfunction  [ ] CHF  [ ] Smoker  [ ] Age > 60 years    [x]  [place]  Reviewed and Copied to Chart. See below.    Plan of care and goal oriented pain management treatment options were discussed with patient and /or primary care giver; all questions and concerns were addressed and care was aligned with patient's wishes.    Educated patient on goal oriented pain management treatment options

## 2024-05-08 NOTE — ED PROVIDER NOTE - OBJECTIVE STATEMENT
54-year-old obese female history of diabetes, hypertension, intercostal neuralgia, complex regional pain syndrome with spinal cord stimulator, presenting with chief complaint of severe right-sided flank pain.  Patient states the flank pain is typical of her intercostal neuralgia.  Patient states that she was last admitted to [place] for 1 week secondary to severe pain.   patient follows with pain management.  Denies any hematuria or dysuria.   of note, patient had a right cheek mass removal on 4/15/2024 at [facility] for, was told mass was lipoma.  Arrives with right cheek swelling.  States that she took some ibuprofen earlier with no relief of symptoms.  No fever no chills.

## 2024-05-09 DIAGNOSIS — Z75.8 OTHER PROBLEMS RELATED TO MEDICAL FACILITIES AND OTHER HEALTH CARE: ICD-10-CM

## 2024-05-09 LAB
A1C WITH ESTIMATED AVERAGE GLUCOSE RESULT: 7.7 % — HIGH (ref 4–5.6)
ALBUMIN SERPL ELPH-MCNC: 2.8 G/DL — LOW (ref 3.5–5)
ALP SERPL-CCNC: 133 U/L — HIGH (ref 40–120)
ALT FLD-CCNC: 28 U/L DA — SIGNIFICANT CHANGE UP (ref 10–60)
ANION GAP SERPL CALC-SCNC: 6 MMOL/L — SIGNIFICANT CHANGE UP (ref 5–17)
AST SERPL-CCNC: 20 U/L — SIGNIFICANT CHANGE UP (ref 10–40)
BASOPHILS # BLD AUTO: 0.03 K/UL — SIGNIFICANT CHANGE UP (ref 0–0.2)
BASOPHILS NFR BLD AUTO: 0.3 % — SIGNIFICANT CHANGE UP (ref 0–2)
BILIRUB SERPL-MCNC: 0.4 MG/DL — SIGNIFICANT CHANGE UP (ref 0.2–1.2)
BUN SERPL-MCNC: 14 MG/DL — SIGNIFICANT CHANGE UP (ref 7–18)
CALCIUM SERPL-MCNC: 7.6 MG/DL — LOW (ref 8.4–10.5)
CHLORIDE SERPL-SCNC: 103 MMOL/L — SIGNIFICANT CHANGE UP (ref 96–108)
CO2 SERPL-SCNC: 28 MMOL/L — SIGNIFICANT CHANGE UP (ref 22–31)
CREAT SERPL-MCNC: 0.77 MG/DL — SIGNIFICANT CHANGE UP (ref 0.5–1.3)
EGFR: 92 ML/MIN/1.73M2 — SIGNIFICANT CHANGE UP
EOSINOPHIL # BLD AUTO: 0.16 K/UL — SIGNIFICANT CHANGE UP (ref 0–0.5)
EOSINOPHIL NFR BLD AUTO: 1.6 % — SIGNIFICANT CHANGE UP (ref 0–6)
ESTIMATED AVERAGE GLUCOSE: 174 MG/DL — HIGH (ref 68–114)
GLUCOSE BLDC GLUCOMTR-MCNC: 247 MG/DL — HIGH (ref 70–99)
GLUCOSE BLDC GLUCOMTR-MCNC: 251 MG/DL — HIGH (ref 70–99)
GLUCOSE BLDC GLUCOMTR-MCNC: 275 MG/DL — HIGH (ref 70–99)
GLUCOSE BLDC GLUCOMTR-MCNC: 339 MG/DL — HIGH (ref 70–99)
GLUCOSE SERPL-MCNC: 271 MG/DL — HIGH (ref 70–99)
HCT VFR BLD CALC: 34.3 % — LOW (ref 34.5–45)
HGB BLD-MCNC: 11.4 G/DL — LOW (ref 11.5–15.5)
IMM GRANULOCYTES NFR BLD AUTO: 0.2 % — SIGNIFICANT CHANGE UP (ref 0–0.9)
LYMPHOCYTES # BLD AUTO: 3.18 K/UL — SIGNIFICANT CHANGE UP (ref 1–3.3)
LYMPHOCYTES # BLD AUTO: 32.3 % — SIGNIFICANT CHANGE UP (ref 13–44)
MAGNESIUM SERPL-MCNC: 1.4 MG/DL — LOW (ref 1.6–2.6)
MCHC RBC-ENTMCNC: 29.6 PG — SIGNIFICANT CHANGE UP (ref 27–34)
MCHC RBC-ENTMCNC: 33.2 GM/DL — SIGNIFICANT CHANGE UP (ref 32–36)
MCV RBC AUTO: 89.1 FL — SIGNIFICANT CHANGE UP (ref 80–100)
MONOCYTES # BLD AUTO: 0.66 K/UL — SIGNIFICANT CHANGE UP (ref 0–0.9)
MONOCYTES NFR BLD AUTO: 6.7 % — SIGNIFICANT CHANGE UP (ref 2–14)
NEUTROPHILS # BLD AUTO: 5.78 K/UL — SIGNIFICANT CHANGE UP (ref 1.8–7.4)
NEUTROPHILS NFR BLD AUTO: 58.9 % — SIGNIFICANT CHANGE UP (ref 43–77)
NRBC # BLD: 0 /100 WBCS — SIGNIFICANT CHANGE UP (ref 0–0)
PHOSPHATE SERPL-MCNC: 3.7 MG/DL — SIGNIFICANT CHANGE UP (ref 2.5–4.5)
PLATELET # BLD AUTO: 404 K/UL — HIGH (ref 150–400)
POTASSIUM SERPL-MCNC: 3.6 MMOL/L — SIGNIFICANT CHANGE UP (ref 3.5–5.3)
POTASSIUM SERPL-SCNC: 3.6 MMOL/L — SIGNIFICANT CHANGE UP (ref 3.5–5.3)
PROT SERPL-MCNC: 6.7 G/DL — SIGNIFICANT CHANGE UP (ref 6–8.3)
RBC # BLD: 3.85 M/UL — SIGNIFICANT CHANGE UP (ref 3.8–5.2)
RBC # FLD: 13.2 % — SIGNIFICANT CHANGE UP (ref 10.3–14.5)
SODIUM SERPL-SCNC: 137 MMOL/L — SIGNIFICANT CHANGE UP (ref 135–145)
WBC # BLD: 9.83 K/UL — SIGNIFICANT CHANGE UP (ref 3.8–10.5)
WBC # FLD AUTO: 9.83 K/UL — SIGNIFICANT CHANGE UP (ref 3.8–10.5)

## 2024-05-09 PROCEDURE — 99233 SBSQ HOSP IP/OBS HIGH 50: CPT

## 2024-05-09 PROCEDURE — 99221 1ST HOSP IP/OBS SF/LOW 40: CPT

## 2024-05-09 PROCEDURE — 99232 SBSQ HOSP IP/OBS MODERATE 35: CPT

## 2024-05-09 RX ORDER — MAGNESIUM SULFATE 500 MG/ML
2 VIAL (ML) INJECTION ONCE
Refills: 0 | Status: COMPLETED | OUTPATIENT
Start: 2024-05-09 | End: 2024-05-09

## 2024-05-09 RX ORDER — INSULIN LISPRO 100/ML
8 VIAL (ML) SUBCUTANEOUS
Refills: 0 | Status: DISCONTINUED | OUTPATIENT
Start: 2024-05-09 | End: 2024-05-10

## 2024-05-09 RX ORDER — INSULIN GLARGINE 100 [IU]/ML
35 INJECTION, SOLUTION SUBCUTANEOUS AT BEDTIME
Refills: 0 | Status: DISCONTINUED | OUTPATIENT
Start: 2024-05-09 | End: 2024-05-10

## 2024-05-09 RX ORDER — KETOROLAC TROMETHAMINE 30 MG/ML
30 SYRINGE (ML) INJECTION ONCE
Refills: 0 | Status: DISCONTINUED | OUTPATIENT
Start: 2024-05-09 | End: 2024-05-09

## 2024-05-09 RX ADMIN — MONTELUKAST 10 MILLIGRAM(S): 4 TABLET, CHEWABLE ORAL at 21:46

## 2024-05-09 RX ADMIN — Medication 8 UNIT(S): at 17:04

## 2024-05-09 RX ADMIN — Medication 1000 MILLIGRAM(S): at 14:04

## 2024-05-09 RX ADMIN — Medication 8 UNIT(S): at 12:11

## 2024-05-09 RX ADMIN — AMPICILLIN SODIUM AND SULBACTAM SODIUM 200 GRAM(S): 250; 125 INJECTION, POWDER, FOR SUSPENSION INTRAMUSCULAR; INTRAVENOUS at 00:49

## 2024-05-09 RX ADMIN — OXYCODONE HYDROCHLORIDE 10 MILLIGRAM(S): 5 TABLET ORAL at 05:23

## 2024-05-09 RX ADMIN — ENOXAPARIN SODIUM 60 MILLIGRAM(S): 100 INJECTION SUBCUTANEOUS at 18:08

## 2024-05-09 RX ADMIN — Medication 1000 MILLIGRAM(S): at 08:12

## 2024-05-09 RX ADMIN — LIDOCAINE 3 PATCH: 4 CREAM TOPICAL at 20:10

## 2024-05-09 RX ADMIN — BUMETANIDE 2 MILLIGRAM(S): 0.25 INJECTION INTRAMUSCULAR; INTRAVENOUS at 06:35

## 2024-05-09 RX ADMIN — Medication 1000 MILLIGRAM(S): at 14:30

## 2024-05-09 RX ADMIN — AMPICILLIN SODIUM AND SULBACTAM SODIUM 200 GRAM(S): 250; 125 INJECTION, POWDER, FOR SUSPENSION INTRAMUSCULAR; INTRAVENOUS at 23:14

## 2024-05-09 RX ADMIN — TIZANIDINE 4 MILLIGRAM(S): 4 TABLET ORAL at 21:46

## 2024-05-09 RX ADMIN — Medication 1000 MILLIGRAM(S): at 22:00

## 2024-05-09 RX ADMIN — Medication 100 MILLIGRAM(S): at 21:46

## 2024-05-09 RX ADMIN — TIZANIDINE 4 MILLIGRAM(S): 4 TABLET ORAL at 14:35

## 2024-05-09 RX ADMIN — Medication 6 MILLIGRAM(S): at 21:53

## 2024-05-09 RX ADMIN — Medication 1000 MILLIGRAM(S): at 06:36

## 2024-05-09 RX ADMIN — Medication 4: at 21:48

## 2024-05-09 RX ADMIN — Medication 6: at 17:03

## 2024-05-09 RX ADMIN — TIZANIDINE 4 MILLIGRAM(S): 4 TABLET ORAL at 06:36

## 2024-05-09 RX ADMIN — Medication 4 UNIT(S): at 08:15

## 2024-05-09 RX ADMIN — INSULIN GLARGINE 35 UNIT(S): 100 INJECTION, SOLUTION SUBCUTANEOUS at 21:47

## 2024-05-09 RX ADMIN — Medication 100 MILLIGRAM(S): at 14:03

## 2024-05-09 RX ADMIN — Medication 25 GRAM(S): at 10:11

## 2024-05-09 RX ADMIN — AMPICILLIN SODIUM AND SULBACTAM SODIUM 200 GRAM(S): 250; 125 INJECTION, POWDER, FOR SUSPENSION INTRAMUSCULAR; INTRAVENOUS at 06:37

## 2024-05-09 RX ADMIN — OXYCODONE HYDROCHLORIDE 10 MILLIGRAM(S): 5 TABLET ORAL at 20:12

## 2024-05-09 RX ADMIN — CARVEDILOL PHOSPHATE 3.12 MILLIGRAM(S): 80 CAPSULE, EXTENDED RELEASE ORAL at 18:07

## 2024-05-09 RX ADMIN — Medication 1000 MILLIGRAM(S): at 21:46

## 2024-05-09 RX ADMIN — PANTOPRAZOLE SODIUM 40 MILLIGRAM(S): 20 TABLET, DELAYED RELEASE ORAL at 06:36

## 2024-05-09 RX ADMIN — Medication 100 MILLIGRAM(S): at 06:36

## 2024-05-09 RX ADMIN — CARVEDILOL PHOSPHATE 3.12 MILLIGRAM(S): 80 CAPSULE, EXTENDED RELEASE ORAL at 06:35

## 2024-05-09 RX ADMIN — AMPICILLIN SODIUM AND SULBACTAM SODIUM 200 GRAM(S): 250; 125 INJECTION, POWDER, FOR SUSPENSION INTRAMUSCULAR; INTRAVENOUS at 12:10

## 2024-05-09 RX ADMIN — ENOXAPARIN SODIUM 60 MILLIGRAM(S): 100 INJECTION SUBCUTANEOUS at 06:35

## 2024-05-09 RX ADMIN — Medication 100 MILLIEQUIVALENT(S): at 00:41

## 2024-05-09 RX ADMIN — OXYCODONE HYDROCHLORIDE 10 MILLIGRAM(S): 5 TABLET ORAL at 06:00

## 2024-05-09 RX ADMIN — LIDOCAINE 3 PATCH: 4 CREAM TOPICAL at 12:05

## 2024-05-09 RX ADMIN — Medication 30 MILLIGRAM(S): at 10:11

## 2024-05-09 RX ADMIN — OXYCODONE HYDROCHLORIDE 10 MILLIGRAM(S): 5 TABLET ORAL at 12:42

## 2024-05-09 RX ADMIN — ATORVASTATIN CALCIUM 20 MILLIGRAM(S): 80 TABLET, FILM COATED ORAL at 21:46

## 2024-05-09 RX ADMIN — Medication 100 MILLIEQUIVALENT(S): at 03:01

## 2024-05-09 RX ADMIN — Medication 6: at 08:14

## 2024-05-09 RX ADMIN — Medication 30 MILLIGRAM(S): at 11:40

## 2024-05-09 RX ADMIN — Medication 4: at 12:10

## 2024-05-09 RX ADMIN — OXYCODONE HYDROCHLORIDE 10 MILLIGRAM(S): 5 TABLET ORAL at 11:47

## 2024-05-09 RX ADMIN — AMPICILLIN SODIUM AND SULBACTAM SODIUM 200 GRAM(S): 250; 125 INJECTION, POWDER, FOR SUSPENSION INTRAMUSCULAR; INTRAVENOUS at 18:08

## 2024-05-09 NOTE — PROGRESS NOTE ADULT - SUBJECTIVE AND OBJECTIVE BOX
Source of information: ROBIN LÓPEZ, Chart review  Patient language: English  : n/a    HPI:  This is a 55 y/o F, from home, lives alone, ambulates with cane/walker/wheelchair, with PMHx of DM, HTN, HLD, CHF, complex regional pain syndrome of right back intercostal muscles s/p spinal cord stimulator (Mar 2023) who presents with right flank pain. Pt states she is having a flare up of her complex regional pain syndrome that started 2 days ago. Pt mentions she ran out of her pain medications and has been taking up to six 800mg Ibuprofen daily. Pt does not follow with a pain specialist currently. Pt also states she had a recent lipoma resection from her right cheek (4/15/2024) after which she completed a course of antibiotics. Pt states she was having drainage and went to see her plastic surgeon on May 2nd, after which they placed a needle in the cheek and drained out fluid. Pt states that since that time she has not had any further fluid drain. Pt mentions she now has pain and slightly increased swelling in the cheek area. Pt mentions she had a 100.6 fever this morning and felt chills. Pt denies any recent travel, recent illness, dysuria, CP, SOB, N/V/D, or constipation. (08 May 2024 09:45)    Pt is admitted for intractable right flank pain. Pain service consulted on 5/8 for management of right flank pain. Per patient, she was diagnosed with complex regional pain syndrome and right sided intercostal neuralgia in 2016. Since her diagnosis she has trialed numerous medications/interventions without relief, including (amitriptyline and duloxetine which caused patti, max dose of gabapentin, and epidural spinal injections). She was recommended a spinal cord stimulator which was placed in March 2023, however she reports that it is not effective in managing her symptoms. She does not currently have an outpatient pain management provider and has been relying on her PCP to prescribe opioids to manage her pain. Patient also present with right facial swelling/droop since 4/24 after removal of right cheek mass.   CT Maxillofacial demonstrates soft tissue swelling with air containing collection overlying the right malar body and the premaxillary space consistent with an air-containing abscess which is new since 7/20/2023.  Pt seen and examined this morning, while laying in bed. At time of assessment, A&Ox4, grimacing in pain, reports pain score 9/10 SCALE USED: (1-10 VNRS). Pt describes pain as localized to right flank with radiation to right upper and lower back, constant, sharp, burning, and aching in quality. The pain is mildly alleviated with current regimen and is exacerbated by movement and taking deep inhalations. Pt tolerating PO diet. Denies lethargy, chest pain, SOB, nausea, vomiting. She reports history of constipation. Last BM 5/8. Patient stated goal for pain control: to be able to take deep breaths, get out of bed to chair and ambulate with tolerable pain control. Patient ambulates with rollator/straight cane at baseline and uses wheelchair for long distances. Per patient, her most recent pain regimen includes xanaflex 4mg TID, Lyrica 100mg TID, and oxycodone 5-10mg.    PAST MEDICAL & SURGICAL HISTORY:  HTN (hypertension)    Neuropathy    Obesity    Former cigarette smoker  (smoked x 45 years; quit ~2013)    Marijuana smoker, continuous  (states smokes 3 - 4 times per day for pain management reasons)    Neuralgia  (pt states hx/o "intercostal neuralgia")    Diabetes    Essential hypertension    IBS (irritable bowel syndrome)    Complex regional pain syndrome type 1    DM2 (diabetes mellitus, type 2)    Intercostal neuralgia    Localized swelling, mass and lump, head    Asthma    History of CHF (congestive heart failure)    Fibroids    Idiopathic intracranial hypertension    History of cholecystectomy    History of hand surgery  (pt states she had 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 1000 milliGRAM(s) Oral every 8 hours  ampicillin/sulbactam  IVPB 3 Gram(s) IV Intermittent every 6 hours  ampicillin/sulbactam  IVPB      atorvastatin 20 milliGRAM(s) Oral at bedtime  buMETAnide 2 milliGRAM(s) Oral daily  carvedilol 3.125 milliGRAM(s) Oral every 12 hours  enoxaparin Injectable 60 milliGRAM(s) SubCutaneous every 12 hours  insulin glargine Injectable (LANTUS) 35 Unit(s) SubCutaneous at bedtime  insulin lispro (ADMELOG) corrective regimen sliding scale   SubCutaneous three times a day before meals  insulin lispro (ADMELOG) corrective regimen sliding scale   SubCutaneous at bedtime  insulin lispro Injectable (ADMELOG) 8 Unit(s) SubCutaneous three times a day before meals  lidocaine   4% Patch 3 Patch Transdermal daily  melatonin 6 milliGRAM(s) Oral at bedtime  montelukast 10 milliGRAM(s) Oral at bedtime  pantoprazole    Tablet 40 milliGRAM(s) Oral before breakfast  potassium chloride    Tablet ER 40 milliEquivalent(s) Oral once  pregabalin 100 milliGRAM(s) Oral three times a day  tiZANidine 4 milliGRAM(s) Oral every 8 hours    MEDICATIONS  (PRN):  acetaminophen     Tablet .. 650 milliGRAM(s) Oral every 6 hours PRN Temp greater or equal to 38C (100.4F), Mild Pain (1 - 3)  aluminum hydroxide/magnesium hydroxide/simethicone Suspension 30 milliLiter(s) Oral every 4 hours PRN Dyspepsia  ondansetron Injectable 4 milliGRAM(s) IV Push every 8 hours PRN Nausea and/or Vomiting  oxyCODONE    IR 10 milliGRAM(s) Oral every 4 hours PRN Severe Pain (7 - 10)    Vital Signs Last 24 Hrs  T(C): 36.3 (09 May 2024 12:53), Max: 36.7 (08 May 2024 18:23)  T(F): 97.4 (09 May 2024 12:53), Max: 98.1 (08 May 2024 18:23)  HR: 54 (09 May 2024 12:53) (54 - 62)  BP: 148/84 (09 May 2024 12:53) (111/57 - 148/84)  BP(mean): 81 (08 May 2024 18:23) (81 - 81)  RR: 17 (09 May 2024 12:53) (16 - 18)  SpO2: 97% (09 May 2024 12:53) (92% - 97%)    Parameters below as of 09 May 2024 12:53  Patient On (Oxygen Delivery Method): room air      SARS-CoV-2: NotDetec (25 Mar 2024 15:31)  SARS-CoV-2: NotDetec (08 Dec 2023 01:28)    LABS: Reviewed                          11.4   9.83  )-----------( 404      ( 09 May 2024 05:27 )             34.3     05-09    137  |  103  |  14  ----------------------------<  271<H>  3.6   |  28  |  0.77    Ca    7.6<L>      09 May 2024 05:27  Phos  3.7     05-09  Mg     1.4     05-09    TPro  6.7  /  Alb  2.8<L>  /  TBili  0.4  /  DBili  x   /  AST  20  /  ALT  28  /  AlkPhos  133<H>  05-09    LIVER FUNCTIONS - ( 09 May 2024 05:27 )  Alb: 2.8 g/dL / Pro: 6.7 g/dL / ALK PHOS: 133 U/L / ALT: 28 U/L DA / AST: 20 U/L / GGT: x           Urinalysis Basic - ( 09 May 2024 05:27 )    Color: x / Appearance: x / SG: x / pH: x  Gluc: 271 mg/dL / Ketone: x  / Bili: x / Urobili: x   Blood: x / Protein: x / Nitrite: x   Leuk Esterase: x / RBC: x / WBC x   Sq Epi: x / Non Sq Epi: x / Bacteria: x    CAPILLARY BLOOD GLUCOSE    POCT Blood Glucose.: 247 mg/dL (09 May 2024 11:53)  POCT Blood Glucose.: 275 mg/dL (09 May 2024 08:06)  POCT Blood Glucose.: 209 mg/dL (08 May 2024 22:36)  POCT Blood Glucose.: 157 mg/dL (08 May 2024 16:41)    SARS-CoV-2: NotDetec (25 Mar 2024 15:31)  SARS-CoV-2: NotDetec (08 Dec 2023 01:28)    Radiology: Reviewed.   < from: CT Maxillofacial w/ IV Cont (05.08.24 @ 14:20) >    ACC: [id number] EXAM:  CT MAXILLOFACIAL  IC   ORDERED BY: BEN HANKS     PROCEDURE DATE:  05/08/2024      INTERPRETATION:  Clinical indication: Cheeks swelling.    Serial thin sections on a multi slice scanner were obtained through the   orbits and paranasal sinuses after contrast infusion with sagittal and   coronal computer-generated reconstructed views. 90 cc of Omnipaque 350   were intravenously injected, 10 cc were discarded.    Comparison is made with the prior maxillofacial CT of7/20/2023.    There is soft tissue swelling overlying the maxilla with inflammatory   changes in the fat. There is a air containing collection within this area   of soft tissue swelling. The collection measures 2.1 cm in AP diameter by   2.6 cm transversely by 2.8 cm in craniocaudal diameter. The air   collection measures 1.6 cm in AP diameter by 2 cm transversely by 2.8 cm   in craniocaudal diameter. This is consistent with an abscess with air   which may be iatrogenic.    There is no adjacent bone destruction. There is no adjacent periodontal   disease. There is a retention cyst or polyp in the right maxillary sinus.   The malar body and zygomatic arch is intact. The nasal septum is midline.   The left maxillary sinus and ethmoid sphenoid and frontal sinuses are   clear.    The visualized vascular structures are intact. The uvula is normal, the   epiglottis is normal. The visualized salivary glands are normal.    IMPRESSION: Soft tissue swelling with air containing collection overlying   the right malar body and the premaxillary space consistent with an   air-containing abscess which is new since 7/20/2023.    --- End of Report ---    ELMA SIDHU MD; Attending Radiologist  This document has been electronically signed. May8 2024  4:35PM    < end of copied text >    < from: CT Abdomen and Pelvis No Cont (05.08.24 @ 06:31) >    ACC: [id number] EXAM:  CT ABDOMEN AND PELVIS   ORDERED BY: TRACE ROYAL     PROCEDURE DATE:  05/08/2024      INTERPRETATION:  CLINICAL INFORMATION: Right flank pain    COMPARISON: CT abdomen pelvis 1/22/2023    CONTRAST/COMPLICATIONS:  IV Contrast: None  Oral Contrast: None  Complications: None    PROCEDURE:  CT of the Abdomen and Pelvis was performed.  Sagittal and coronal reformats were performed.    FINDINGS:  LOWER CHEST: Aortic root, coronary artery, mitral annular calcifications.   Linear subsegmental atelectasis in the lingula.    LIVER: Within normal limits.  BILE DUCTS: Normal caliber.  GALLBLADDER: Cholecystectomy.  SPLEEN: Within normal limits.  PANCREAS: Within normal limits.  ADRENALS: Within normal limits.  KIDNEYS/URETERS: Punctate nonobstructing stone in the interpolar region   of the right kidney. No hydronephrosis or perinephric inflammation.    BLADDER: Within normal limits.  REPRODUCTIVE ORGANS: Intrauterine device. No gross adnexal masses.    BOWEL: Duodenal diverticulum. No bowel obstruction. Appendix is normal.   Moderate stool in the rectosigmoid colon. No bowel wall thickening or   pericolonic inflammatory change.  PERITONEUM: No ascites.  VESSELS: Mild atherosclerotic changes.  RETROPERITONEUM/LYMPH NODES: No lymphadenopathy.  ABDOMINAL WALL: Small fat-containing umbilical hernia. Spinal stimulator   generator in the right flank subcutaneous soft tissues. Mild diffuse   anasarca.  BONES: Degenerative changes. Spinal stimulator electrodes.    IMPRESSION:  No evidence for obstructive uropathy.    --- End of Report ---    MILE COSTELLO MD; Attending Radiologist  This document has been electronically signed. May  8 2024  6:54AM    < end of copied text >    ORT Score -   Family Hx of substance abuse	Female	      Male  Alcohol 	                                           1                     3  Illegal drugs	                                   2                     3  Rx drugs                                           4 	                  4  Personal Hx of substance abuse		  Alcohol 	                                          3	                  3  Illegal drugs                                     4	                  4  Rx drugs                                            5 	                  5  Age between 16- 45 years	           1                     1  hx preadolescent sexual abuse	   3 	                  0  Psychological disease		  ADD, OCD, bipolar, schizophrenia   2	          2  Depression                                           1 	          1  Total: 0    a score of 3 or lower indicates low risk for opioid abuse		  a score of 4-7 indicates moderate risk for opioid abuse		  a score of 8 or higher indicates high risk for opioid abuse  	  REVIEW OF SYSTEMS:  CONSTITUTIONAL: No fever or fatigue  HEENT:  No difficulty hearing, no change in vision  NECK: No pain or stiffness  RESPIRATORY: No cough, wheezing, chills or hemoptysis; No shortness of breath  CARDIOVASCULAR: No chest pain, palpitations, dizziness, or leg swelling  GASTROINTESTINAL: No loss of appetite, decreased PO intake. No abdominal or epigastric pain. No nausea, vomiting; No diarrhea or constipation.   GENITOURINARY: No dysuria, frequency, hematuria, retention or incontinence  MUSCULOSKELETAL: + right flank pain; + right upper and lower back pain, chronic upper or lower motor strength weakness due to pain, no saddle anesthesia, bowel/bladder incontinence, no falls   NEURO: No headaches, No numbness/tingling b/l LE, No weakness  ENDOCRINE: No polyuria, polydipsia, heat or cold intolerance; No hair loss  PSYCHIATRIC: No depression, anxiety or difficulty sleeping    PHYSICAL EXAM:  GENERAL:  Alert & Oriented X4, cooperative, NAD, Good concentration. Speech is clear, (right facial droop, since 4/2024 after surgical removal of right cheek mass.)  RESPIRATORY: Respirations even and unlabored. Clear to auscultation bilaterally  CARDIOVASCULAR: Normal S1/S2, regular rate and rhythm  GASTROINTESTINAL:  Soft, obese per BMI, right flank tenderness, bowel sounds present  PERIPHERAL VASCULAR: Extremities warm without edema. 2+ Peripheral Pulses, No cyanosis, No calf tenderness  MUSCULOSKELETAL: Motor Strength 4/5 B/L upper and lower extremities; moves all extremities equally against gravity; + right upper and lower back tenderness  SKIN: Warm, dry, intact, spinal cord stimulator battery palpable to right low back, mid back scar from spinal cord stimulator insertion    Risk factors associated with adverse outcomes related to opioid treatment  [ ] Concurrent benzodiazepine use  [ ] History/ Active substance use or alcohol use disorder  [ ] Psychiatric co-morbidity  [ ] Sleep apnea  [ ] COPD  [x] BMI> 35  [ ] Liver dysfunction  [ ] Renal dysfunction  [ ] CHF  [ ] Smoker  [ ] Age > 60 years    [x]  [place]  Reviewed and Copied to Chart. See below.    Plan of care and goal oriented pain management treatment options were discussed with patient and /or primary care giver; all questions and concerns were addressed and care was aligned with patient's wishes.    Educated patient on goal oriented pain management treatment options

## 2024-05-09 NOTE — PROGRESS NOTE ADULT - ASSESSMENT
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 Admitted for intractable pain management and cheek swelling. Pt is s/p lipoma resection 4/15 [facility]. pt had drainage and had needle placed in cheek by outpatient plastic surgeon, no further drainage was noted by plastic surgeon.   CT abdomen/Pelvis showed no evidence for obstructive uropathy. CT maxillofacial showed  soft tissue swelling with air containing collection overlying the right malar body and the premaxillary space consistent with an air-containing abscess which is new since 7/20/2023.  Pain mgmt following, recs are noted. Pending clinical improvement in pain; If pt becomes febrile, please collect blood cultures 55 y/o F, from home, lives alone, ambulates with cane/walker/wheelchair, with PMHx of DM, HTN, HLD, CHF, complex regional pain syndrome of right back intercostal muscles s/p spinal cord stimulator (Mar 2023) who presents with right flank pain. Admitted for intractable pain management and cheek swelling. Pt is s/p lipoma resection 4/15 [facility]. pt had drainage and had needle placed in cheek by outpatient plastic surgeon, no further drainage was noted by plastic surgeon.   CT abdomen/Pelvis showed no evidence for obstructive uropathy. CT maxillofacial showed  soft tissue swelling with air containing collection overlying the right malar body and the premaxillary space consistent with an air-containing abscess which is new since 7/20/2023.  Pain mgmt following, recs are noted. Pending clinical improvement in pain; If pt becomes febrile, please collect blood cultures.   Upon discharge, ensure that pt has a pain managment provider to followup on.

## 2024-05-09 NOTE — PROGRESS NOTE ADULT - PROBLEM SELECTOR PLAN 1
-Pt has a history of CRPS type 1, diagnosed 8 yrs ago. Used to follow with pain management however left due to complications with physician, has not be able to find another pain doctor since  -Presents with right flank pain, radiating in the back area. Pt states this is day 3 of her flare up  -CT A/P non-obstructive uropathy  -Pain management recs noted

## 2024-05-09 NOTE — PROGRESS NOTE ADULT - ASSESSMENT
Search Terms: Fauzia Linares, 1970Search Date: 05/08/2024 09:30:53 AM  The Drug Utilization Report below displays all of the controlled substance prescriptions, if any, that your patient has filled in the last twelve months. The information displayed on this report is compiled from pharmacy submissions to the Department, and accurately reflects the information as submitted by the pharmacies.    This report was requested by: Danni Silva | Reference #: [id number]    Practitioner Count: 5  Pharmacy Count: 2  Current Opioid Prescriptions: 1  Current Benzodiazepine Prescriptions: 0  Current Stimulant Prescriptions: 0      Patient Demographic Information (PDI)       PDI	First Name	Last Name	Birth Date	Gender	Street Address	[facility]	Zip Code  A	Fauzia Linares	1970	Female	[address]	[place]	[address]  B	Fauzia Linares	1970	Female	[address]	[place]	[address]  C	Fauzia Linares	1970	Female	[address]	[facility]	[place]	[address]  D	Fauzia Linares	1970	Female	[address]	[facility]	[place]	[address]  E	Fauzia Linares	1970	Female	[address]	[facility]	[place]	[address]  F	Fauzia Linares	1970	Female	[address]	[facility]	[place]	[address]  G	Fauzia Linares	1970	Female	[address]	[place]	[address]    Prescription Information      PDI Filter:    PDI	My Rx	Current Rx	Drug Type	Rx Written	Rx Dispensed	Drug	Quantity	Days Supply	Prescriber Name	Prescriber CROW #	Payment Method	Dispenser  A	N	N	O	03/26/2024	03/26/2024	oxycodone hcl (ir) 5 mg tablet	30	5	Quinones, Elizabeth MERLOS	[id number]	[facility]	N	N	O	03/26/2024	03/26/2024	oxycodone hcl (ir) 10 mg tab	30	5	Quinones, Elizabeth MERLOS	[id number]	[facility]  A	N	N	O	03/26/2024	03/26/2024	oxycontin er 10 mg tablet	10	5	Quinones, Elizabeth MERLOS	[id number]	[facility]  B	N	N		10/06/2023	10/07/2023	pregabalin 100 mg capsule	90	30	Tc, Reba	[id number]	Medicare	[facility]  B	N	N	O	09/28/2023	10/01/2023	oxycodone hcl (ir) 15 mg tab	30	5	Tc, Reba	[id number]	Medicare	[facility]  B	N	N	O	09/27/2023	09/27/2023	oxycodone hcl (ir) 15 mg tab	30	8	Tc, Reba	[id number]	Medicare	[facility]  B	N	N	O	09/10/2023	09/10/2023	oxycodone hcl (ir) 15 mg tab	30	5	Tc, Reba	[id number]	Medicare	[facility]  B	N	N		09/06/2023	09/06/2023	pregabalin 100 mg capsule	90	30	Tc, Reba	[id number]	Medicare	[facility]  B	N	N	O	08/27/2023	08/28/2023	oxycodone hcl (ir) 15 mg tab	30	5	Tc, Reba	[id number]	Medicare	[facility]  B	N	N	O	08/12/2023	08/13/2023	oxycodone hcl (ir) 15 mg tab	30	5	Tc, Reba	[id number]	Medicare	[facility]  B	N	N		08/04/2023	08/05/2023	pregabalin 100 mg capsule	90	30	Tc, Reba	[id number]	Medicare	[facility]  B	N	N		07/21/2023	07/22/2023	pregabalin 100 mg capsule	42	14	Tc, Reba	[id number]	Medicare	[facility]  B	N	N	O	07/16/2023	07/16/2023	oxycodone hcl (ir) 15 mg tab	30	7	Tc, Reba	[id number]	Medicare	[facility]  B	N	N	O	06/30/2023	07/01/2023	oxycodone hcl (ir) 15 mg tab	42	10	Tc, Reba	[id number]	Medicare	[facility]  B	N	N		06/19/2023	06/20/2023	pregabalin 100 mg capsule	90	30	Tc, Reba	[id number]	Medicare	[facility]  B	N	N	O	06/06/2023	06/07/2023	oxycodone hcl (ir) 15 mg tab	42	7	Tc, Reba	[id number]	Medicare	[facility]  B	N	N	O	05/22/2023	05/23/2023	oxycodone hcl (ir) 15 mg tab	42	10	Tc, Reba	[id number]	Medicare	[facility]  B	N	N		05/19/2023	05/20/2023	pregabalin 100 mg capsule	90	30	Tc, Reba	[id number]	Medicare	[facility]  C	N	N	O	11/28/2023	11/30/2023	oxycodone hcl (ir) 20 mg tab	60	30	Vinny Maldonado MD	[id number]	Insurance	[id number]  C	N	N	O	11/16/2023	11/18/2023	oxycodone-acetaminophen  mg tab	30	15	Vinny Maldonado MD	[id number]	Insurance	[id number]  C	N	N		11/16/2023	11/18/2023	pregabalin 100 mg capsule	90	30	Vinny Maldonado MD	[id number]	Insurance	[id number]  C	N	N		09/28/2023	11/01/2023	pregabalin 100 mg capsule	30	10	Tc, Reba	[id number]	Insurance	[id number]  D	N	N	O	12/29/2023	12/29/2023	oxycodone hcl (ir) 15 mg tab	12	3	Christopher Montiel	[id number]	Insurance	[facility]  D	N	N		12/29/2023	12/29/2023	butalbital-acetaminophen-caffeine -40 mg tablet	53	8	Christopher Montiel	[id number]	Insurance	[facility]	N	N	O	10/12/2023	10/14/2023	oxycodone hcl (ir) 15 mg tab	28	7	Naomi Ybarra	[id number]	Insurance	[id number]  E	N	N		02/08/2024	02/09/2024	pregabalin 100 mg capsule	42	7	Alba Jasso M	[id number]	Insurance	[facility]  E	N	N	O	02/08/2024	02/09/2024	hydromorphone 4 mg tablet	20	5	Alba Jasso M	[id number]	[facility]  E	N	N		01/25/2024	01/26/2024	pregabalin 100 mg capsule	90	30	Vinny Maldonado MD	[id number]	[facility]	N	N	O	01/25/2024	01/26/2024	oxycodone hcl (ir) 20 mg tab	60	30	Vinny Maldonado MD	[id number]	[facility]	N	Y		04/03/2024	05/06/2024	pregabalin 100 mg capsule	90	30	Vinny Maldonado MD	[id number]	[facility]	N	Y	O	04/30/2024	05/02/2024	oxycodone hcl (ir) 20 mg tab	60	30	Vinny Maldonado MD	[id number]	[facility]	N	N	O	04/16/2024	04/17/2024	oxycodone hcl (ir) 5 mg tablet	10	3	Frank Ba	[id number]	[facility]	N	N		04/03/2024	04/04/2024	pregabalin 100 mg capsule	90	30	Vinny Maldonado MD	[id number]	[facility]	N	N	O	03/04/2024	03/05/2024	hydromorphone 4 mg tablet	20	5	Bonny Bryant	[id number]	[facility]  G	N	N		09/28/2023	10/02/2023	pregabalin 100 mg capsule	30	10	Reba Montez	[id number]	[id number]    * - Details of Drug Type : O = Opioid, B = Benzodiazepine, S = Stimulant    * - Drugs marked with an asterisk are compound drugs. If the compound drug is made up of more than one controlled substance, then each controlled substance will be a separate row in the table.

## 2024-05-09 NOTE — CONSULT NOTE ADULT - SUBJECTIVE AND OBJECTIVE BOX
HPI:  53yo F with PMHx of DM, HTN, HLD, CHF, complex regional pain syndrome of right back intercostal muscles s/p spinal cord stimulator (Mar 2023) presents with chief complaint of flare up of her complex regional pain syndrome in the right flank. Pt has been experiencing significant pain to her back for the past 3 days after she ran out of her pain medications. She tried managing the pain with ibuprofen but did not experience any pain relief. Pt is also s/p lipoma resection from her right cheek (4/15/2024) by Dr. Frank Morocho after which she completed a course of antibiotics. However, pt developed drainage from the surgical site and her plastic surgeon placed a needle to help drain out the fluid on 5/2. Since then, she hasn't noticed any drainage but feels the pain and swelling to her cheek area have worsened and she had a fever to 100.6 prior to admission.  Pt denies any recent travel, recent illness, dysuria, N/V/C/SOB.    Allergies  amitriptyline (Other)  duloxetine (Other)  Intolerances      PAST MEDICAL & SURGICAL HISTORY:  HTN (hypertension)  Neuropathy  Obesity  Former cigarette smoker  (smoked x 45 years; quit ~2013)  Marijuana smoker, continuous  (states smokes 3 - 4 times per day for pain management reasons)  Neuralgia  (pt states hx/o "intercostal neuralgia")  Diabetes  Essential hypertension  IBS (irritable bowel syndrome)  Complex regional pain syndrome type 1  DM2 (diabetes mellitus, type 2)  Intercostal neuralgia  Localized swelling, mass and lump, head  Asthma  History of CHF (congestive heart failure)  Fibroids  Idiopathic intracranial hypertension  History of cholecystectomy    History of hand surgery  (pt states she had 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 No nausea, vomiting, or hematemesis; No diarrhea or constipation. No melena or hematochezia.  GENITOURINARY:  No dysuria, frequency or hematuria  NEUROLOGICAL:  No HA, no numbness or LE weakness  MSK: no back pain, no joint pain  SKIN:  No itching, no skin rash    PHYSICAL EXAM:  VITALS: Vital Signs Last 24 Hrs  T(C): 36.7 (09 May 2024 04:45), Max: 36.7 (08 May 2024 18:23)  T(F): 98 (09 May 2024 04:45), Max: 98.1 (08 May 2024 18:23)  HR: 60 (09 May 2024 04:45) (56 - 69)  BP: 116/71 (09 May 2024 04:45) (111/57 - 151/85)  BP(mean): 81 (08 May 2024 18:23) (81 - 81)  RR: 17 (09 May 2024 04:45) (16 - 18)  SpO2: 92% (09 May 2024 04:45) (92% - 97%)    Parameters below as of 09 May 2024 04:45  Patient On (Oxygen Delivery Method): room air      Gen: AOx3, NAD, non-toxic, pleasant  HEAD:  Atraumatic, Normocephalic  EYES: PERRLA, conjunctiva and sclera clear  ENT: Moist mucous membranes  NECK: Supple, No JVD  CV: S1+S2 normal, no murmurs   Resp: Clear bilat, no resp distress, no crackles/wheezes  Abd: Soft, nontender, +BS  Ext: No LE edema, no cyanosis, LE pulses present  : no dysuria, no gross hematuria, Cadena (+/-)  IV/Skin: No thrombophlebitis, no skin rash  Msk: No low back pain, no arthralgias, no joint swelling  Neuro: AAOx3. No sensory deficits, no motor deficits  Psych: no anxiety, no delusional ideas, no suicidal ideation    LABS/DIAGNOSTIC TESTS:                        11.4   9.83  )-----------( 404      ( 09 May 2024 05:27 )             34.3     WBC Count: 9.83 K/uL (05-09 @ 05:27)  WBC Count: 12.30 K/uL (05-07 @ 23:36)    05-09    137  |  103  |  14  ----------------------------<  271<H>  3.6   |  28  |  0.77    Ca    7.6<L>      09 May 2024 05:27  Phos  3.7     05-09  Mg     1.4     05-09    TPro  6.7  /  Alb  2.8<L>  /  TBili  0.4  /  DBili  x   /  AST  20  /  ALT  28  /  AlkPhos  133<H>  05-09    Urinalysis Basic - ( 09 May 2024 05:27 )    Color: x / Appearance: x / SG: x / pH: x  Gluc: 271 mg/dL / Ketone: x  / Bili: x / Urobili: x   Blood: x / Protein: x / Nitrite: x   Leuk Esterase: x / RBC: x / WBC x   Sq Epi: x / Non Sq Epi: x / Bacteria: x      LACTATE:    CULTURES:     Urinalysis with Rflx Culture (collected 05-08-24 @ 04:47)              RADIOLOGY: All pertinent available imaging reviewed    ANTIBIOTICS:  ampicillin/sulbactam  IVPB    ampicillin/sulbactam  IVPB 3 every 6 hours        IMPRESSION:       PLAN:      Please reach ID with any questions or concerns  Dr. Cha Rao  Available in Teams               HPI:  55yo F with PMHx of DM, HTN, HLD, CHF, complex regional pain syndrome of right back intercostal muscles s/p spinal cord stimulator (Mar 2023) presents with chief complaint of flare up of her complex regional pain syndrome in the right flank. Pt has been experiencing significant pain to her back for the past 3 days after she ran out of her pain medications. She tried managing the pain with ibuprofen but did not experience any pain relief. Pt is also s/p lipoma resection from her right cheek (4/15/2024) by Dr. Frank Morocho after which she completed a course of antibiotics. However, pt developed drainage from the surgical site and her plastic surgeon placed a needle to help drain out the fluid on 5/2. Since then, she hasn't noticed any drainage but feels the pain and swelling to her cheek area have worsened and she had a fever to 100.6 prior to admission.  Pt denies any recent travel, recent illness, dysuria, N/V/C/SOB.    Allergies  amitriptyline (Other)  duloxetine (Other)  Intolerances      PAST MEDICAL & SURGICAL HISTORY:  HTN (hypertension)  Neuropathy  Obesity  Former cigarette smoker  (smoked x 45 years; quit ~2013)  Marijuana smoker, continuous  (states smokes 3 - 4 times per day for pain management reasons)  Neuralgia  (pt states hx/o "intercostal neuralgia")  Diabetes  Essential hypertension  IBS (irritable bowel syndrome)  Complex regional pain syndrome type 1  DM2 (diabetes mellitus, type 2)  Intercostal neuralgia  Localized swelling, mass and lump, head  Asthma  History of CHF (congestive heart failure)  Fibroids  Idiopathic intracranial hypertension  History of cholecystectomy    History of hand surgery  (pt states she had 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 No nausea, vomiting, or hematemesis; No diarrhea or constipation. No melena or hematochezia.  GENITOURINARY:  No dysuria, frequency or hematuria  NEUROLOGICAL:  No HA, no numbness or LE weakness  MSK: + back pain, no joint pain, pain present to the right side of the face  SKIN:   No itching, burning, rashes, or lesions     PHYSICAL EXAM:  VITALS: Vital Signs Last 24 Hrs  T(C): 36.7 (09 May 2024 04:45), Max: 36.7 (08 May 2024 18:23)  T(F): 98 (09 May 2024 04:45), Max: 98.1 (08 May 2024 18:23)  HR: 60 (09 May 2024 04:45) (56 - 69)  BP: 116/71 (09 May 2024 04:45) (111/57 - 151/85)  BP(mean): 81 (08 May 2024 18:23) (81 - 81)  RR: 17 (09 May 2024 04:45) (16 - 18)  SpO2: 92% (09 May 2024 04:45) (92% - 97%)    Parameters below as of 09 May 2024 04:45  Patient On (Oxygen Delivery Method): room air      Gen: AOx3, NAD, non-toxic, pleasant  HEAD:  Atraumatic, Normocephalic  EYES: PERRLA, conjunctiva and sclera clear  ENT: Moist mucous membranes  NECK: Supple, No JVD  CV: S1+S2 normal, no murmurs   Resp: Clear bilat, no resp distress, no crackles/wheezes  FACE: Right cheek swelling and facial droop, +tenderness  Abd: Soft, nontender, +BS  Ext: No LE edema, no cyanosis, LE pulses present  : no dysuria, no gross hematuria, Cadena (+/-)  IV/Skin: No thrombophlebitis, no skin rash, no erythema  Msk: + back pain, no arthralgias, no joint swelling  Neuro: AAOx3. No sensory deficits, no motor deficits  Psych: no anxiety, no delusional ideas, no suicidal ideation    LABS/DIAGNOSTIC TESTS:                        11.4   9.83  )-----------( 404      ( 09 May 2024 05:27 )             34.3     WBC Count: 9.83 K/uL (05-09 @ 05:27)  WBC Count: 12.30 K/uL (05-07 @ 23:36)    05-09    137  |  103  |  14  ----------------------------<  271<H>  3.6   |  28  |  0.77    Ca    7.6<L>      09 May 2024 05:27  Phos  3.7     05-09  Mg     1.4     05-09      CULTURES:   Urinalysis with Rflx Culture (05.08.24 @ 04:47)   Urine Appearance: Cloudy  Color: Yellow  Specific Gravity: 1.027  pH Urine: 6.0  Protein, Urine: Trace mg/dL  Glucose Qualitative, Urine: Negative mg/dL  Ketone - Urine: Trace mg/dL  Blood, Urine: Small  Bilirubin: Negative  Urobilinogen: 1.0 mg/dL  Leukocyte Esterase Concentration: Moderate  Nitrite: Negative    RADIOLOGY:   < from: CT Maxillofacial w/ IV Cont (05.08.24 @ 14:20) >    IMPRESSION: Soft tissue swelling with air containing collection overlying   the right malar body and the premaxillary space consistent with an   air-containing abscess which is new since 7/20/2023.    < from: CT Abdomen and Pelvis No Cont (05.08.24 @ 06:31) >  IMPRESSION:  No evidence for obstructive uropathy.    < from: Xray Chest 2 Views PA/Lat (05.08.24 @ 01:26) >  IMPRESSION: Some linear scarring and/or platelike atelectatic change in   the left and right chest. Some slight prominence to interstitial markings   in the right infrahilar region which could represent an inflammatory   infectious process and/or atelectatic change suggested on the frontal   view. Heart is within normal limits in its transthoracic diameter.   Regional osseous structures appropriate for age      ANTIBIOTICS:  ampicillin/sulbactam  IVPB    ampicillin/sulbactam  IVPB 3 every 6 hours    IMPRESSION:  55yo F with PMHx of DM, HTN, HLD, CHF, complex regional pain syndrome of right back intercostal muscles s/p spinal cord stimulator (Mar 2023) presents with chief complaint of flare up of her complex regional pain syndrome in the right flank. Pt is also s/p lipoma resection from her right cheek (4/15/2024) by Dr. Frank Morocho after which she completed a course of antibiotics. However, pt developed drainage from the surgical site and her plastic surgeon placed a needle to help drain out the fluid on 5/2. Since then, she hasn't noticed any drainage but feels the pain and swelling to her cheek area have worsened and she had a fever to 100.6 prior to admission. CT shows air-containing abscess overlying the right malar body and the premaxillary spac   Pt currently on unasyn and reports experiencing some improvement in her facial swelling and pain. ID consulted for antibiotic reccomendations    Right premaxillary abscess  Complex regional pain syndrome type 1  DM    PLAN:  c/w Unasyn 3g IV Q6hs  Pending IR consult for facial abscess drainage   Recommend oral and maxillofacial surgery consult  Remaining care as per medicine team  Emphasized importance of strict glycemic control                 HPI:  53yo F with PMHx of DM, HTN, HLD, CHF, complex regional pain syndrome of right back intercostal muscles s/p spinal cord stimulator (Mar 2023) presents with chief complaint of flare up of her complex regional pain syndrome in the right flank. Pt has been experiencing significant pain to her back for the past 3 days after she ran out of her pain medications. She tried managing the pain with ibuprofen but did not experience any pain relief. Pt is also s/p lipoma resection from her right cheek (4/15/2024) by Dr. Frank Morocho after which she completed a course of antibiotics. However, pt developed drainage from the surgical site and her plastic surgeon placed a needle to help drain out the fluid on 5/2. Since then, she hasn't noticed any drainage but feels the pain and swelling to her cheek area have worsened and she had a fever to 100.6 prior to admission.  Pt denies any recent travel, recent illness, dysuria, N/V/C/SOB.    Allergies  amitriptyline (Other)  duloxetine (Other)  Intolerances    PAST MEDICAL & SURGICAL HISTORY:  HTN (hypertension)  Neuropathy  Obesity  Former cigarette smoker  (smoked x 45 years; quit ~2013)  Marijuana smoker, continuous  (states smokes 3 - 4 times per day for pain management reasons)  Neuralgia  (pt states hx/o "intercostal neuralgia")  Diabetes  Essential hypertension  IBS (irritable bowel syndrome)  Complex regional pain syndrome type 1  DM2 (diabetes mellitus, type 2)  Intercostal neuralgia  Localized swelling, mass and lump, head  Asthma  History of CHF (congestive heart failure)  Fibroids  Idiopathic intracranial hypertension  History of cholecystectomy    History of hand surgery  (pt states she had 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 No nausea, vomiting, or hematemesis; No diarrhea or constipation. No melena or hematochezia.  GENITOURINARY:  No dysuria, frequency or hematuria  NEUROLOGICAL:  No HA, no numbness or LE weakness  MSK: + back pain, no joint pain, pain present to the right side of the face  SKIN:   No itching, burning, rashes, or lesions     PHYSICAL EXAM:  VITALS: Vital Signs Last 24 Hrs  T(C): 36.7 (09 May 2024 04:45), Max: 36.7 (08 May 2024 18:23)  T(F): 98 (09 May 2024 04:45), Max: 98.1 (08 May 2024 18:23)  HR: 60 (09 May 2024 04:45) (56 - 69)  BP: 116/71 (09 May 2024 04:45) (111/57 - 151/85)  BP(mean): 81 (08 May 2024 18:23) (81 - 81)  RR: 17 (09 May 2024 04:45) (16 - 18)  SpO2: 92% (09 May 2024 04:45) (92% - 97%)    Parameters below as of 09 May 2024 04:45  Patient On (Oxygen Delivery Method): room air    Gen: AOx3, NAD, non-toxic, pleasant  HEAD:  Atraumatic, Normocephalic, R facial swelling, tenderness on palpation  EYES: PERRLA, conjunctiva and sclera clear  ENT: Moist mucous membranes  NECK: Supple, No JVD  CV: S1+S2 normal, no murmurs   Resp: Clear bilat, no resp distress, no crackles/wheezes  FACE: Right cheek swelling and facial droop, +tenderness  Abd: Soft, nontender, +BS  Ext: No LE edema, no cyanosis, LE pulses present  : no dysuria, no gross hematuria   IV/Skin: No thrombophlebitis, no skin rash, no erythema  Msk: + back pain, no arthralgias, no joint swelling  Neuro: AAOx3. No focal signs     LABS/DIAGNOSTIC TESTS:                        11.4   9.83  )-----------( 404      ( 09 May 2024 05:27 )             34.3     WBC Count: 9.83 K/uL (05-09 @ 05:27)  WBC Count: 12.30 K/uL (05-07 @ 23:36)    05-09    137  |  103  |  14  ----------------------------<  271<H>  3.6   |  28  |  0.77    Ca    7.6<L>      09 May 2024 05:27  Phos  3.7     05-09  Mg     1.4     05-09      CULTURES: Urine Appearance: Cloudy  Color: Yellow  Specific Gravity: 1.027  pH Urine: 6.0  Protein, Urine: Trace mg/dL  Glucose Qualitative, Urine: Negative mg/dL  Ketone - Urine: Trace mg/dL  Blood, Urine: Small  Bilirubin: Negative  Urobilinogen: 1.0 mg/dL  Leukocyte Esterase Concentration: Moderate  Nitrite: Negative    RADIOLOGY:   < from: CT Maxillofacial w/ IV Cont (05.08.24 @ 14:20) >    IMPRESSION: Soft tissue swelling with air containing collection overlying   the right malar body and the premaxillary space consistent with an   air-containing abscess which is new since 7/20/2023.    < from: CT Abdomen and Pelvis No Cont (05.08.24 @ 06:31) >  IMPRESSION:  No evidence for obstructive uropathy.    < from: Xray Chest 2 Views PA/Lat (05.08.24 @ 01:26) >  IMPRESSION: Some linear scarring and/or platelike atelectatic change in the left and right chest. Some slight prominence to interstitial markings in the right infrahilar region which could represent an inflammatory   infectious process and/or atelectatic change suggested on the frontal view. Heart is within normal limits in its transthoracic diameter. Regional osseous structures appropriate for age    ANTIBIOTICS:  ampicillin/sulbactam  IVPB    ampicillin/sulbactam  IVPB 3 every 6 hours    IMPRESSION:  53yo F with PMHx of DM, HTN, HLD, CHF, complex regional pain syndrome of right back intercostal muscles s/p spinal cord stimulator (Mar 2023) presents with chief complaint of flare up of her complex regional pain syndrome in the right flank. Pt is also s/p lipoma resection from her right cheek (4/15/2024) by Dr. Frank Morocho after which she completed a course of antibiotics. However, pt developed drainage from the surgical site and her plastic surgeon placed a needle to help drain out the fluid on 5/2. Since then, she hasn't noticed any drainage but feels the pain and swelling to her cheek area have worsened and she had a fever to 100.6 prior to admission. CT shows air-containing abscess overlying the right malar body and the premaxillary space. Pt currently on unasyn and reports experiencing some improvement in her facial swelling and pain. ID consulted for antibiotic recommendations.    Right premaxillary abscess  Complex regional pain syndrome type 1  DM    PLAN:  c/w Unasyn 3g IV Q6hs  Pending IR consult for facial abscess drainage   Recommend oral and maxillofacial surgery consult  Remaining care as per medicine team  Emphasized importance of strict glycemic control

## 2024-05-09 NOTE — CONSULT NOTE ADULT - ATTENDING COMMENTS
55yo F with PMHx of DM, HTN, HLD, CHF, complex regional pain syndrome of right back intercostal muscles s/p spinal cord stimulator (Mar 2023) presents with chief complaint of flare up of her complex regional pain syndrome admitted for periodontal abscess.     CT Maxillofacial w/ IV Cont (05.08.24 @ 14:20) There is soft tissue swelling overlying the maxilla with inflammatory changes in the fat. There is a air containing collection within this area of soft tissue swelling ~ 2.1 cm this is consistent with an abscess with air which may be iatrogenic. There is no adjacent bone destruction. There is no adjacent periodontal disease.     -Periodontal abscess  -DM    Continue Unasyn 3g q6 hrs IV  Follow up cultures  ESR and CRP  Dental eval for abscess drainage  Discussed with Dr. Yang    Please reach ID with any questions or concerns  Dr. Cha Rao  Available in Teams

## 2024-05-09 NOTE — PROGRESS NOTE ADULT - PROBLEM SELECTOR PLAN 2
-s/p lipoma resection 4/15 and [facility]. Checked up on last week, had needle placed in cheek, no further drainage  mild fever at home, now with slightly increased swelling  -c/w unasyn

## 2024-05-09 NOTE — PROGRESS NOTE ADULT - PROBLEM SELECTOR PLAN 1
Pt with chronic right flank pain which is somatic and neuropathic in nature due to history of complex regional pain syndrome and right intercostal neuralgia. Since her diagnosis she has trialed numerous medications/interventions without relief, including (amitriptyline and duloxetine which caused patti, max dose of gabapentin, and epidural spinal injections). She was recommended a spinal cord stimulator which was placed in March 2023, however she reports that it is not effective in managing her symptoms. She does not currently have an outpatient pain management provider and has been relying on her PCP to prescribe opioids to manage her pain. Recommend neurology consult.   Opioid pain recommendations   - Continue Oxycodone 10 mg PO q 4 hours PRN severe pain. Monitor for sedation/ respiratory depression. (Patient was taking as outpatient)  Non-opioid pain recommendations   - Give Toradol 30mg IVP once STAT. (patient states Toradol didn't work in past, but willing to try one dose)  - Continue Tizanidine 4mg q 8hrs. Monitor for sedation (home med)  - Continue Acetaminophen 1 gram PO q 8 hours x 3 days. Monitor LFTs  - Continue Lyrica 100mg q 8hrs. (home med)  - Continue Lidoderm 4% patch daily. x 3 (12 hrs on/12 hrs off)  Bowel Regimen  - Continue Miralax 17G PO daily  - Continue Senna 2 tablets at bedtime for constipation  Mild pain (score 1-3)  - Non-pharmacological pain treatment recommendations  - Warm/ Cool packs PRN   - Repositioning extremity, elevation, imagery, relaxation, distraction.  - Physical therapy OOB if no contraindications   Recommendations discussed with primary team and RN.    *** Patient can follow up with Dr. Hay- [phone], upon discharge from hospital ***

## 2024-05-09 NOTE — PROGRESS NOTE ADULT - SUBJECTIVE AND OBJECTIVE BOX
Patient is a 54y old  Female who presents with a chief complaint of Complex Regional Pain syndrome flare (09 May 2024 11:46)      OVERNIGHT EVENTS:    REVIEW OF SYSTEMS:  CONSTITUTIONAL: No fever, chills  ENMT:  No difficulty hearing, no change in vision  NECK: No pain or stiffness  RESPIRATORY: No cough, SOB  CARDIOVASCULAR: No chest pain, palpitations  GASTROINTESTINAL: No abdominal pain. No nausea, vomiting, or diarrhea  GENITOURINARY: No dysuria  NEUROLOGICAL: No HA  SKIN: No itching, burning, rashes, or lesions   LYMPH NODES: No enlarged glands  ENDOCRINE: No heat or cold intolerance; No hair loss  MUSCULOSKELETAL: No joint pain or swelling; No muscle, back, or extremity pain  PSYCHIATRIC: No depression, anxiety  HEME/LYMPH: No easy bruising, or bleeding gums    T(C): 36.3 (05-09-24 @ 12:53), Max: 36.7 (05-08-24 @ 18:23)  HR: 54 (05-09-24 @ 12:53) (54 - 62)  BP: 148/84 (05-09-24 @ 12:53) (114/71 - 148/84)  RR: 17 (05-09-24 @ 12:53) (16 - 17)  SpO2: 97% (05-09-24 @ 12:53) (92% - 97%)  Wt(kg): --Vital Signs Last 24 Hrs  T(C): 36.3 (09 May 2024 12:53), Max: 36.7 (08 May 2024 18:23)  T(F): 97.4 (09 May 2024 12:53), Max: 98.1 (08 May 2024 18:23)  HR: 54 (09 May 2024 12:53) (54 - 62)  BP: 148/84 (09 May 2024 12:53) (114/71 - 148/84)  BP(mean): 81 (08 May 2024 18:23) (81 - 81)  RR: 17 (09 May 2024 12:53) (16 - 17)  SpO2: 97% (09 May 2024 12:53) (92% - 97%)    Parameters below as of 09 May 2024 12:53  Patient On (Oxygen Delivery Method): room air          PHYSICAL EXAM:  GENERAL: NAD  EYES: clear conjunctiva; EOMI  ENMT: Moist mucous membranes  NECK: Supple, No JVD, Normal thyroid  CHEST/LUNG: Clear to auscultation bilaterally; No rales, rhonchi, wheezing, or rubs  HEART: S1, S2, Regular rate and rhythm  ABDOMEN: Soft, Nontender, Nondistended; Bowel sounds present  NEURO: Alert & Oriented X3  EXTREMITIES: No LE edema, no calf tenderness  LYMPH: No lymphadenopathy noted  SKIN: No rashes or lesions    Consultant(s) Notes Reviewed:  [x ] YES  [ ] NO  Care Discussed with Consultants/Other Providers [ x] YES  [ ] NO  LABS:                        11.4   9.83  )-----------( 404      ( 09 May 2024 05:27 )             34.3     05-09    137  |  103  |  14  ----------------------------<  271<H>  3.6   |  28  |  0.77    Ca    7.6<L>      09 May 2024 05:27  Phos  3.7     05-09  Mg     1.4     05-09    TPro  6.7  /  Alb  2.8<L>  /  TBili  0.4  /  DBili  x   /  AST  20  /  ALT  28  /  AlkPhos  133<H>  05-09      CAPILLARY BLOOD GLUCOSE      POCT Blood Glucose.: 251 mg/dL (09 May 2024 16:38)  POCT Blood Glucose.: 247 mg/dL (09 May 2024 11:53)  POCT Blood Glucose.: 275 mg/dL (09 May 2024 08:06)  POCT Blood Glucose.: 209 mg/dL (08 May 2024 22:36)      Urinalysis Basic - ( 09 May 2024 05:27 )    Color: x / Appearance: x / SG: x / pH: x  Gluc: 271 mg/dL / Ketone: x  / Bili: x / Urobili: x   Blood: x / Protein: x / Nitrite: x   Leuk Esterase: x / RBC: x / WBC x   Sq Epi: x / Non Sq Epi: x / Bacteria: x        RADIOLOGY & ADDITIONAL TESTS:  Imaging Personally Reviewed:  [ ] YES  [ ] NO

## 2024-05-10 DIAGNOSIS — N20.0 CALCULUS OF KIDNEY: ICD-10-CM

## 2024-05-10 DIAGNOSIS — E87.6 HYPOKALEMIA: ICD-10-CM

## 2024-05-10 LAB
ALBUMIN SERPL ELPH-MCNC: 2.6 G/DL — LOW (ref 3.5–5)
ALP SERPL-CCNC: 143 U/L — HIGH (ref 40–120)
ALT FLD-CCNC: 34 U/L DA — SIGNIFICANT CHANGE UP (ref 10–60)
ANION GAP SERPL CALC-SCNC: 7 MMOL/L — SIGNIFICANT CHANGE UP (ref 5–17)
AST SERPL-CCNC: 25 U/L — SIGNIFICANT CHANGE UP (ref 10–40)
BILIRUB SERPL-MCNC: 0.3 MG/DL — SIGNIFICANT CHANGE UP (ref 0.2–1.2)
BUN SERPL-MCNC: 17 MG/DL — SIGNIFICANT CHANGE UP (ref 7–18)
CALCIUM SERPL-MCNC: 7.8 MG/DL — LOW (ref 8.4–10.5)
CHLORIDE SERPL-SCNC: 102 MMOL/L — SIGNIFICANT CHANGE UP (ref 96–108)
CO2 SERPL-SCNC: 28 MMOL/L — SIGNIFICANT CHANGE UP (ref 22–31)
CREAT SERPL-MCNC: 1.13 MG/DL — SIGNIFICANT CHANGE UP (ref 0.5–1.3)
CRP SERPL-MCNC: 12 MG/L — HIGH
CULTURE RESULTS: SIGNIFICANT CHANGE UP
EGFR: 58 ML/MIN/1.73M2 — LOW
ERYTHROCYTE [SEDIMENTATION RATE] IN BLOOD: 28 MM/HR — HIGH (ref 0–20)
GLUCOSE BLDC GLUCOMTR-MCNC: 171 MG/DL — HIGH (ref 70–99)
GLUCOSE BLDC GLUCOMTR-MCNC: 212 MG/DL — HIGH (ref 70–99)
GLUCOSE BLDC GLUCOMTR-MCNC: 268 MG/DL — HIGH (ref 70–99)
GLUCOSE BLDC GLUCOMTR-MCNC: 354 MG/DL — HIGH (ref 70–99)
GLUCOSE SERPL-MCNC: 392 MG/DL — HIGH (ref 70–99)
HCT VFR BLD CALC: 33.2 % — LOW (ref 34.5–45)
HGB BLD-MCNC: 10.8 G/DL — LOW (ref 11.5–15.5)
MCHC RBC-ENTMCNC: 29 PG — SIGNIFICANT CHANGE UP (ref 27–34)
MCHC RBC-ENTMCNC: 32.5 GM/DL — SIGNIFICANT CHANGE UP (ref 32–36)
MCV RBC AUTO: 89.2 FL — SIGNIFICANT CHANGE UP (ref 80–100)
NRBC # BLD: 0 /100 WBCS — SIGNIFICANT CHANGE UP (ref 0–0)
PLATELET # BLD AUTO: 359 K/UL — SIGNIFICANT CHANGE UP (ref 150–400)
POTASSIUM SERPL-MCNC: 3.3 MMOL/L — LOW (ref 3.5–5.3)
POTASSIUM SERPL-SCNC: 3.3 MMOL/L — LOW (ref 3.5–5.3)
PROT SERPL-MCNC: 6.8 G/DL — SIGNIFICANT CHANGE UP (ref 6–8.3)
RBC # BLD: 3.72 M/UL — LOW (ref 3.8–5.2)
RBC # FLD: 13.4 % — SIGNIFICANT CHANGE UP (ref 10.3–14.5)
SODIUM SERPL-SCNC: 137 MMOL/L — SIGNIFICANT CHANGE UP (ref 135–145)
SPECIMEN SOURCE: SIGNIFICANT CHANGE UP
WBC # BLD: 6.71 K/UL — SIGNIFICANT CHANGE UP (ref 3.8–10.5)
WBC # FLD AUTO: 6.71 K/UL — SIGNIFICANT CHANGE UP (ref 3.8–10.5)

## 2024-05-10 PROCEDURE — 99232 SBSQ HOSP IP/OBS MODERATE 35: CPT

## 2024-05-10 PROCEDURE — 99233 SBSQ HOSP IP/OBS HIGH 50: CPT

## 2024-05-10 RX ORDER — OXYCODONE HYDROCHLORIDE 5 MG/1
10 TABLET ORAL EVERY 6 HOURS
Refills: 0 | Status: DISCONTINUED | OUTPATIENT
Start: 2024-05-10 | End: 2024-05-13

## 2024-05-10 RX ORDER — POTASSIUM CHLORIDE 20 MEQ
40 PACKET (EA) ORAL ONCE
Refills: 0 | Status: COMPLETED | OUTPATIENT
Start: 2024-05-10 | End: 2024-05-10

## 2024-05-10 RX ORDER — LISINOPRIL 2.5 MG/1
5 TABLET ORAL DAILY
Refills: 0 | Status: DISCONTINUED | OUTPATIENT
Start: 2024-05-10 | End: 2024-05-10

## 2024-05-10 RX ORDER — INSULIN GLARGINE 100 [IU]/ML
50 INJECTION, SOLUTION SUBCUTANEOUS AT BEDTIME
Refills: 0 | Status: DISCONTINUED | OUTPATIENT
Start: 2024-05-10 | End: 2024-05-12

## 2024-05-10 RX ORDER — HYDROMORPHONE HYDROCHLORIDE 2 MG/ML
1 INJECTION INTRAMUSCULAR; INTRAVENOUS; SUBCUTANEOUS EVERY 4 HOURS
Refills: 0 | Status: DISCONTINUED | OUTPATIENT
Start: 2024-05-10 | End: 2024-05-13

## 2024-05-10 RX ORDER — SENNA PLUS 8.6 MG/1
2 TABLET ORAL AT BEDTIME
Refills: 0 | Status: DISCONTINUED | OUTPATIENT
Start: 2024-05-10 | End: 2024-05-18

## 2024-05-10 RX ORDER — METHOCARBAMOL 500 MG/1
750 TABLET, FILM COATED ORAL EVERY 8 HOURS
Refills: 0 | Status: COMPLETED | OUTPATIENT
Start: 2024-05-10 | End: 2024-05-17

## 2024-05-10 RX ORDER — INSULIN LISPRO 100/ML
12 VIAL (ML) SUBCUTANEOUS
Refills: 0 | Status: DISCONTINUED | OUTPATIENT
Start: 2024-05-10 | End: 2024-05-18

## 2024-05-10 RX ORDER — POLYETHYLENE GLYCOL 3350 17 G/17G
17 POWDER, FOR SOLUTION ORAL DAILY
Refills: 0 | Status: DISCONTINUED | OUTPATIENT
Start: 2024-05-10 | End: 2024-05-11

## 2024-05-10 RX ADMIN — HYDROMORPHONE HYDROCHLORIDE 1 MILLIGRAM(S): 2 INJECTION INTRAMUSCULAR; INTRAVENOUS; SUBCUTANEOUS at 18:02

## 2024-05-10 RX ADMIN — ATORVASTATIN CALCIUM 20 MILLIGRAM(S): 80 TABLET, FILM COATED ORAL at 22:18

## 2024-05-10 RX ADMIN — ENOXAPARIN SODIUM 60 MILLIGRAM(S): 100 INJECTION SUBCUTANEOUS at 17:12

## 2024-05-10 RX ADMIN — OXYCODONE HYDROCHLORIDE 10 MILLIGRAM(S): 5 TABLET ORAL at 16:45

## 2024-05-10 RX ADMIN — Medication 1000 MILLIGRAM(S): at 05:39

## 2024-05-10 RX ADMIN — INSULIN GLARGINE 50 UNIT(S): 100 INJECTION, SOLUTION SUBCUTANEOUS at 22:19

## 2024-05-10 RX ADMIN — OXYCODONE HYDROCHLORIDE 10 MILLIGRAM(S): 5 TABLET ORAL at 10:01

## 2024-05-10 RX ADMIN — Medication 12 UNIT(S): at 16:48

## 2024-05-10 RX ADMIN — OXYCODONE HYDROCHLORIDE 10 MILLIGRAM(S): 5 TABLET ORAL at 15:35

## 2024-05-10 RX ADMIN — HYDROMORPHONE HYDROCHLORIDE 1 MILLIGRAM(S): 2 INJECTION INTRAMUSCULAR; INTRAVENOUS; SUBCUTANEOUS at 11:19

## 2024-05-10 RX ADMIN — CARVEDILOL PHOSPHATE 3.12 MILLIGRAM(S): 80 CAPSULE, EXTENDED RELEASE ORAL at 05:38

## 2024-05-10 RX ADMIN — Medication 1000 MILLIGRAM(S): at 15:45

## 2024-05-10 RX ADMIN — Medication 40 MILLIEQUIVALENT(S): at 08:48

## 2024-05-10 RX ADMIN — METHOCARBAMOL 750 MILLIGRAM(S): 500 TABLET, FILM COATED ORAL at 22:18

## 2024-05-10 RX ADMIN — Medication 100 MILLIGRAM(S): at 05:37

## 2024-05-10 RX ADMIN — Medication 6: at 11:50

## 2024-05-10 RX ADMIN — HYDROMORPHONE HYDROCHLORIDE 1 MILLIGRAM(S): 2 INJECTION INTRAMUSCULAR; INTRAVENOUS; SUBCUTANEOUS at 17:23

## 2024-05-10 RX ADMIN — POLYETHYLENE GLYCOL 3350 17 GRAM(S): 17 POWDER, FOR SOLUTION ORAL at 11:52

## 2024-05-10 RX ADMIN — AMPICILLIN SODIUM AND SULBACTAM SODIUM 200 GRAM(S): 250; 125 INJECTION, POWDER, FOR SUSPENSION INTRAMUSCULAR; INTRAVENOUS at 11:54

## 2024-05-10 RX ADMIN — LIDOCAINE 3 PATCH: 4 CREAM TOPICAL at 00:00

## 2024-05-10 RX ADMIN — SENNA PLUS 2 TABLET(S): 8.6 TABLET ORAL at 22:18

## 2024-05-10 RX ADMIN — BUMETANIDE 2 MILLIGRAM(S): 0.25 INJECTION INTRAMUSCULAR; INTRAVENOUS at 05:37

## 2024-05-10 RX ADMIN — TIZANIDINE 4 MILLIGRAM(S): 4 TABLET ORAL at 05:37

## 2024-05-10 RX ADMIN — Medication 1000 MILLIGRAM(S): at 22:18

## 2024-05-10 RX ADMIN — Medication 1000 MILLIGRAM(S): at 23:32

## 2024-05-10 RX ADMIN — Medication 8 UNIT(S): at 08:32

## 2024-05-10 RX ADMIN — LIDOCAINE 3 PATCH: 4 CREAM TOPICAL at 19:53

## 2024-05-10 RX ADMIN — Medication 6 MILLIGRAM(S): at 22:18

## 2024-05-10 RX ADMIN — ENOXAPARIN SODIUM 60 MILLIGRAM(S): 100 INJECTION SUBCUTANEOUS at 05:39

## 2024-05-10 RX ADMIN — OXYCODONE HYDROCHLORIDE 10 MILLIGRAM(S): 5 TABLET ORAL at 05:38

## 2024-05-10 RX ADMIN — LIDOCAINE 3 PATCH: 4 CREAM TOPICAL at 11:52

## 2024-05-10 RX ADMIN — AMPICILLIN SODIUM AND SULBACTAM SODIUM 200 GRAM(S): 250; 125 INJECTION, POWDER, FOR SUSPENSION INTRAMUSCULAR; INTRAVENOUS at 17:12

## 2024-05-10 RX ADMIN — Medication 100 MILLIGRAM(S): at 13:52

## 2024-05-10 RX ADMIN — CARVEDILOL PHOSPHATE 3.12 MILLIGRAM(S): 80 CAPSULE, EXTENDED RELEASE ORAL at 17:12

## 2024-05-10 RX ADMIN — Medication 4: at 16:48

## 2024-05-10 RX ADMIN — AMPICILLIN SODIUM AND SULBACTAM SODIUM 200 GRAM(S): 250; 125 INJECTION, POWDER, FOR SUSPENSION INTRAMUSCULAR; INTRAVENOUS at 05:38

## 2024-05-10 RX ADMIN — Medication 10: at 08:31

## 2024-05-10 RX ADMIN — METHOCARBAMOL 750 MILLIGRAM(S): 500 TABLET, FILM COATED ORAL at 13:52

## 2024-05-10 RX ADMIN — HYDROMORPHONE HYDROCHLORIDE 1 MILLIGRAM(S): 2 INJECTION INTRAMUSCULAR; INTRAVENOUS; SUBCUTANEOUS at 13:03

## 2024-05-10 RX ADMIN — Medication 12 UNIT(S): at 11:51

## 2024-05-10 RX ADMIN — OXYCODONE HYDROCHLORIDE 10 MILLIGRAM(S): 5 TABLET ORAL at 11:14

## 2024-05-10 RX ADMIN — Medication 1000 MILLIGRAM(S): at 13:52

## 2024-05-10 RX ADMIN — MONTELUKAST 10 MILLIGRAM(S): 4 TABLET, CHEWABLE ORAL at 22:18

## 2024-05-10 RX ADMIN — ONDANSETRON 4 MILLIGRAM(S): 8 TABLET, FILM COATED ORAL at 17:48

## 2024-05-10 RX ADMIN — PANTOPRAZOLE SODIUM 40 MILLIGRAM(S): 20 TABLET, DELAYED RELEASE ORAL at 05:55

## 2024-05-10 RX ADMIN — LIDOCAINE 3 PATCH: 4 CREAM TOPICAL at 23:32

## 2024-05-10 RX ADMIN — Medication 100 MILLIGRAM(S): at 22:18

## 2024-05-10 NOTE — PROGRESS NOTE ADULT - SUBJECTIVE AND OBJECTIVE BOX
Source of information: ROBIN LÓPEZ, Chart review  Patient language: English  : n/a    HPI:  This is a 55 y/o F, from home, lives alone, ambulates with cane/walker/wheelchair, with PMHx of DM, HTN, HLD, CHF, complex regional pain syndrome of right back intercostal muscles s/p spinal cord stimulator (Mar 2023) who presents with right flank pain. Pt states she is having a flare up of her complex regional pain syndrome that started 2 days ago. Pt mentions she ran out of her pain medications and has been taking up to six 800mg Ibuprofen daily. Pt does not follow with a pain specialist currently. Pt also states she had a recent lipoma resection from her right cheek (4/15/2024) after which she completed a course of antibiotics. Pt states she was having drainage and went to see her plastic surgeon on May 2nd, after which they placed a needle in the cheek and drained out fluid. Pt states that since that time she has not had any further fluid drain. Pt mentions she now has pain and slightly increased swelling in the cheek area. Pt mentions she had a 100.6 fever this morning and felt chills. Pt denies any recent travel, recent illness, dysuria, CP, SOB, N/V/D, or constipation. (08 May 2024 09:45)    Pt is admitted for intractable right flank pain. Pain service consulted on 5/8 for management of right flank pain. Per patient, she was diagnosed with complex regional pain syndrome and right sided intercostal neuralgia in 2016. Since her diagnosis she has trialed numerous medications/interventions without relief, including (amitriptyline and duloxetine which caused patti, max dose of gabapentin, and epidural spinal injections). She was recommended a spinal cord stimulator which was placed in March 2023, however she reports that it is not effective in managing her symptoms. She does not currently have an outpatient pain management provider and has been relying on her PCP to prescribe opioids to manage her pain. Patient also present with right facial swelling/droop since 4/24 after removal of right cheek mass. CT Maxillofacial demonstrates soft tissue swelling with air containing collection overlying the right malar body and the premaxillary space consistent with an air-containing abscess which is new since 7/20/2023. Pt seen and examined this morning, while laying in bed. Pt found sitting up in bed, A&Ox4, grimacing in pain, reports pain score "15/10" on right flank area. SCALE USED: (1-10 VNRS). Pt describes pain as constant, sharp, burning, and aching in quality, also reports with muscle spasms. Pt reports that Tizanidine is not working for muscle spasms. Pt is amenable to start Robaxin 750 mg po q8h for muscle cramps The pain is mildly alleviated with current regimen and is exacerbated by movement and taking deep inhalations. Pt stating that when she experiences this flare of pain con right flank, the only thing that helps is IV Dilaudid Pt also reports taking Oxycodone 20 mg po BID when pain occurs at home. Her pain tolerance is high and her base pain level is 9/10. Pt tolerating PO diet. Denies lethargy, chest pain, SOB, nausea, vomiting. She reports history of constipation. Last BM 5/10. Patient stated goal for pain control: to be able to take deep breaths, get out of bed to chair and ambulate with tolerable pain control. Patient ambulates with rollator/straight cane at baseline and uses wheelchair for long distances. Per patient, her most recent pain regimen includes xanaflex 4mg TID, Lyrica 100mg TID, and oxycodone 10mg BID. Pt also reports being ad admitted to [facility] on 3/2024 with same symptoms and flare where she was treated with Dilaudid IV and Ketamine IV to control acute pain and then DC home. Pt denies using Marihuana or other substance currently.    PAST MEDICAL & SURGICAL HISTORY:  HTN (hypertension)    Neuropathy    Obesity    Former cigarette smoker  (smoked x 45 years; quit ~2013)    Marijuana smoker, continuous  (states smokes 3 - 4 times per day for pain management reasons)    Neuralgia  (pt states hx/o "intercostal neuralgia")    Diabetes    Essential hypertension    IBS (irritable bowel syndrome)    Complex regional pain syndrome type 1    DM2 (diabetes mellitus, type 2)    Intercostal neuralgia    Localized swelling, mass and lump, head    Asthma    History of CHF (congestive heart failure)    Fibroids    Idiopathic intracranial hypertension    History of cholecystectomy    History of hand surgery  (pt states she had 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 1000 milliGRAM(s) Oral every 8 hours  ampicillin/sulbactam  IVPB      ampicillin/sulbactam  IVPB 3 Gram(s) IV Intermittent every 6 hours  atorvastatin 20 milliGRAM(s) Oral at bedtime  buMETAnide 2 milliGRAM(s) Oral daily  carvedilol 3.125 milliGRAM(s) Oral every 12 hours  enoxaparin Injectable 60 milliGRAM(s) SubCutaneous every 12 hours  insulin glargine Injectable (LANTUS) 50 Unit(s) SubCutaneous at bedtime  insulin lispro (ADMELOG) corrective regimen sliding scale   SubCutaneous three times a day before meals  insulin lispro (ADMELOG) corrective regimen sliding scale   SubCutaneous at bedtime  insulin lispro Injectable (ADMELOG) 12 Unit(s) SubCutaneous three times a day before meals  lidocaine   4% Patch 3 Patch Transdermal daily  melatonin 6 milliGRAM(s) Oral at bedtime  methocarbamol 750 milliGRAM(s) Oral every 8 hours  montelukast 10 milliGRAM(s) Oral at bedtime  pantoprazole    Tablet 40 milliGRAM(s) Oral before breakfast  polyethylene glycol 3350 17 Gram(s) Oral daily  pregabalin 100 milliGRAM(s) Oral three times a day  senna 2 Tablet(s) Oral at bedtime    MEDICATIONS  (PRN):  acetaminophen     Tablet .. 650 milliGRAM(s) Oral every 6 hours PRN Temp greater or equal to 38C (100.4F), Mild Pain (1 - 3)  aluminum hydroxide/magnesium hydroxide/simethicone Suspension 30 milliLiter(s) Oral every 4 hours PRN Dyspepsia  HYDROmorphone  Injectable 1 milliGRAM(s) IV Push every 4 hours PRN Pain  ondansetron Injectable 4 milliGRAM(s) IV Push every 8 hours PRN Nausea and/or Vomiting  oxyCODONE    IR 10 milliGRAM(s) Oral every 6 hours PRN Moderate Pain (4 - 6)    Vital Signs Last 24 Hrs  T(C): 36.6 (10 May 2024 13:53), Max: 36.6 (10 May 2024 05:26)  T(F): 97.8 (10 May 2024 13:53), Max: 97.8 (10 May 2024 05:26)  HR: 56 (10 May 2024 13:53) (56 - 66)  BP: 149/70 (10 May 2024 13:53) (122/70 - 151/74)  BP(mean): --  RR: 17 (10 May 2024 13:53) (16 - 18)  SpO2: 98% (10 May 2024 13:53) (94% - 100%)    Parameters below as of 10 May 2024 13:53  Patient On (Oxygen Delivery Method): room air    SARS-CoV-2: NotDetec (25 Mar 2024 15:31)  SARS-CoV-2: NotDetec (08 Dec 2023 01:28)    LABS: Reviewed                        10.   6.71  )-----------( 359      ( 10 May 2024 06:40 )             33.2     05-10    137  |  102  |  17  ----------------------------<  392<H>  3.3<L>   |  28  |  1.13    Ca    7.8<L>      10 May 2024 06:40  Phos  3.7     05-09  Mg     1.4     05-09    TPro  6.8  /  Alb  2.6<L>  /  TBili  0.3  /  DBili  x   /  AST  25  /  ALT  34  /  AlkPhos  143<H>  05-10    LIVER FUNCTIONS - ( 10 May 2024 06:40 )  Alb: 2.6 g/dL / Pro: 6.8 g/dL / ALK PHOS: 143 U/L / ALT: 34 U/L DA / AST: 25 U/L / GGT: x           Urinalysis Basic - ( 10 May 2024 06:40 )    Color: x / Appearance: x / SG: x / pH: x  Gluc: 392 mg/dL / Ketone: x  / Bili: x / Urobili: x   Blood: x / Protein: x / Nitrite: x   Leuk Esterase: x / RBC: x / WBC x   Sq Epi: x / Non Sq Epi: x / Bacteria: x    CAPILLARY BLOOD GLUCOSE    POCT Blood Glucose.: 212 mg/dL (10 May 2024 16:35)  POCT Blood Glucose.: 268 mg/dL (10 May 2024 11:35)  POCT Blood Glucose.: 354 mg/dL (10 May 2024 07:59)  POCT Blood Glucose.: 339 mg/dL (09 May 2024 20:53)    SARS-CoV-2: NotDetec (25 Mar 2024 15:31)  SARS-CoV-2: NotDetec (08 Dec 2023 01:28)    Radiology: Reviewed  ACC: [id number] EXAM:  CT MAXILLOFACIAL  IC   ORDERED BY: BEN HANKS     PROCEDURE DATE:  05/08/2024      INTERPRETATION:  Clinical indication: Cheeks swelling.    Serial thin sections on a multi slice scanner were obtained through the   orbits and paranasal sinuses after contrast infusion with sagittal and   coronal computer-generated reconstructed views. 90 cc of Omnipaque 350   were intravenously injected, 10 cc were discarded.    Comparison is made with the prior maxillofacial CT of7/20/2023.    There is soft tissue swelling overlying the maxilla with inflammatory   changes in the fat. There is a air containing collection within this area   of soft tissue swelling. The collection measures 2.1 cm in AP diameter by   2.6 cm transversely by 2.8 cm in craniocaudal diameter. The air   collection measures 1.6 cm in AP diameter by 2 cm transversely by 2.8 cm   in craniocaudal diameter. This is consistent with an abscess with air   which may be iatrogenic.    There is no adjacent bone destruction. There is no adjacent periodontal   disease. There is a retention cyst or polyp in the right maxillary sinus.   The malar body and zygomatic arch is intact. The nasal septum is midline.   The left maxillary sinus and ethmoid sphenoid and frontal sinuses are   clear.    The visualized vascular structures are intact. The uvula is normal, the   epiglottis is normal. The visualized salivary glands are normal.    IMPRESSION: Soft tissue swelling with air containing collection overlying   the right malar body and the premaxillary space consistent with an   air-containing abscess which is new since 7/20/2023.    --- End of Report ---    ELMA SIDHU MD; Attending Radiologist  This document has been electronically signed. May8 2024  4:35PM  ACC: [id number] EXAM:  CT ABDOMEN AND PELVIS   ORDERED BY: TRACE ROYAL     PROCEDURE DATE:  05/08/2024      INTERPRETATION:  CLINICAL INFORMATION: Right flank pain    COMPARISON: CT abdomen pelvis 1/22/2023    CONTRAST/COMPLICATIONS:  IV Contrast: None  Oral Contrast: None  Complications: None    PROCEDURE:  CT of the Abdomen and Pelvis was performed.  Sagittal and coronal reformats were performed.    FINDINGS:  LOWER CHEST: Aortic root, coronary artery, mitral annular calcifications.   Linear subsegmental atelectasis in the lingula.    LIVER: Within normal limits.  BILE DUCTS: Normal caliber.  GALLBLADDER: Cholecystectomy.  SPLEEN: Within normal limits.  PANCREAS: Within normal limits.  ADRENALS: Within normal limits.  KIDNEYS/URETERS: Punctate nonobstructing stone in the interpolar region   of the right kidney. No hydronephrosis or perinephric inflammation.    BLADDER: Within normal limits.  REPRODUCTIVE ORGANS: Intrauterine device. No gross adnexal masses.    BOWEL: Duodenal diverticulum. No bowel obstruction. Appendix is normal.   Moderate stool in the rectosigmoid colon. No bowel wall thickening or   pericolonic inflammatory change.  PERITONEUM: No ascites.  VESSELS: Mild atherosclerotic changes.  RETROPERITONEUM/LYMPH NODES: No lymphadenopathy.  ABDOMINAL WALL: Small fat-containing umbilical hernia. Spinal stimulator   generator in the right flank subcutaneous soft tissues. Mild diffuse   anasarca.  BONES: Degenerative changes. Spinal stimulator electrodes.    IMPRESSION:  No evidence for obstructive uropathy.    --- End of Report ---    MILE COSTELLO MD; Attending Radiologist  This document has been electronically signed. May  8 2024  6:54AM  ACC: [id number] EXAM:  XR CHEST PA LAT 2V   ORDERED BY: TRACE ROYAL     PROCEDURE DATE:  05/08/2024      INTERPRETATION:  EXAM: XR CHEST PA AND LATERAL    INDICATION: R flan kpain HXR    COMPARISON: March 23, 2024    IMPRESSION: Some linear scarring and/or platelike atelectatic change in   the left and right chest. Some slight prominence to interstitial markings   in the right infrahilar region which could represent an inflammatory   infectious process and/or atelectatic change suggested on the frontal   view. Heart is within normal limits in its transthoracic diameter.   Regional osseous structures appropriate for age    --- End of Report ---    MARK BECK MD; Attending Radiologist  This document has been electronically signed. May  8 2024  9:00AM    ORT Score -   Family Hx of substance abuse	Female	      Male  Alcohol 	                                           1                     3  Illegal drugs	                                   2                     3  Rx drugs                                           4 	                  4  Personal Hx of substance abuse		  Alcohol 	                                          3	                  3  Illegal drugs                                     4	                  4  Rx drugs                                            5 	                  5  Age between 16- 45 years	           1                     1  hx preadolescent sexual abuse	   3 	                  0  Psychological disease		  ADD, OCD, bipolar, schizophrenia   2	          2  Depression                                           1 	          1  Total: 0    a score of 3 or lower indicates low risk for opioid abuse		  a score of 4-7 indicates moderate risk for opioid abuse		  a score of 8 or higher indicates high risk for opioid abuse    REVIEW OF SYSTEMS:  CONSTITUTIONAL: No fever or fatigue  HEENT:  No difficulty hearing, no change in vision  NECK: No pain or stiffness  RESPIRATORY: No cough, wheezing, chills or hemoptysis; No shortness of breath  CARDIOVASCULAR: No chest pain, palpitations, dizziness, or leg swelling  GASTROINTESTINAL: No loss of appetite, decreased PO intake. No abdominal or epigastric pain. No nausea, vomiting; No diarrhea or constipation.   GENITOURINARY: No dysuria, frequency, hematuria, retention or incontinence  MUSCULOSKELETAL: + right flank pain; + right upper and lower back pain, chronic upper or lower motor strength weakness due to pain, no saddle anesthesia, bowel/bladder incontinence, no falls   NEURO: No headaches, + numbness/tingling b/l LE, No weakness  ENDOCRINE: No polyuria, polydipsia, heat or cold intolerance; No hair loss  PSYCHIATRIC: No depression, anxiety or difficulty sleeping    PHYSICAL EXAM:  GENERAL:  Alert & Oriented X4, cooperative, NAD, Good concentration. Speech is clear, (right facial droop, since 4/2024 after surgical removal of right cheek mass.)  RESPIRATORY: Respirations even and unlabored. Clear to auscultation bilaterally  CARDIOVASCULAR: Normal S1/S2, regular rate and rhythm  GASTROINTESTINAL:  Soft, obese per BMI, right flank tenderness, bowel sounds present  PERIPHERAL VASCULAR: Extremities warm without edema. 2+ Peripheral Pulses, No cyanosis, No calf tenderness  MUSCULOSKELETAL: Motor Strength 4/5 B/L upper and lower extremities; moves all extremities equally against gravity; + right upper and lower back tenderness  SKIN: Warm, dry, intact, spinal cord stimulator battery palpable to right low back, mid back scar from spinal cord stimulator insertion    Risk factors associated with adverse outcomes related to opioid treatment  [ ]  Concurrent benzodiazepine use  [ ]  History/ Active substance use or alcohol use disorder  [ ] Psychiatric co-morbidity  [ ] Sleep apnea  [ ] COPD  [x] BMI> 35  [ ] Liver dysfunction  [ ] Renal dysfunction  [ ] CHF  [x] Former smoker  [ ]  Age > 60 years    [x]  [place]  Reviewed and Copied to Chart. See below.    Plan of care and goal oriented pain management treatment options were discussed with patient and /or primary care giver; all questions and concerns were addressed and care was aligned with patient's wishes.    Educated patient on goal oriented pain management treatment options     05-10-24 @ 17:49

## 2024-05-10 NOTE — PROGRESS NOTE ADULT - PROBLEM SELECTOR PLAN 7
Lovenox  60mg BID (adjusted for BMI > 50) for DVT ppx Pt has a history of HLD, takes atorvastatin at home  - C/w statin Pt has a history of HTN, takes Bumex 2mg daily and coreg 3.125mg BID and lisinopril 5mg at home  as above

## 2024-05-10 NOTE — PROGRESS NOTE ADULT - PROBLEM SELECTOR PLAN 5
Pt has a history of HTN, takes Bumex 2mg daily and coreg 3.125mg BID and lisinopril 5mg at home  as above Pt has a history of CHF, takes Bumex 2mg daily and coreg 3.125mg BID and lisinopril 5mg daily  - hold lisinopril for now, as patient BP is controlled, intermittent elevated due to pain and pt requires IV Dilaudid  - restart lisinopril on dc   - C/w Home meds K 3.3   add potassium 40 meq x1 dose  monitor BMP

## 2024-05-10 NOTE — PROGRESS NOTE ADULT - SUBJECTIVE AND OBJECTIVE BOX
Patient is a 54y old  Female who presents with a chief complaint of Complex Regional Pain syndrome flare (10 May 2024 17:00)    OVERNIGHT EVENTS: no acute changes.     Pt is aox3, c/o severe right sided flank pain, tolerating PO, ambulating independently.     REVIEW OF SYSTEMS:  CONSTITUTIONAL: No fever, chills  ENMT: +right facial pain  NECK: No pain or stiffness  RESPIRATORY: No cough, SOB  CARDIOVASCULAR: No chest pain, palpitations  GASTROINTESTINAL: No abdominal pain. No nausea, vomiting, or diarrhea  GENITOURINARY: No dysuria  NEUROLOGICAL: No HA  SKIN: No itching, burning, rashes, or lesions   LYMPH NODES: No enlarged glands  ENDOCRINE: No heat or cold intolerance; No hair loss  MUSCULOSKELETAL: +right lower back pain radiating right flank  PSYCHIATRIC: No depression, anxiety  HEME/LYMPH: No easy bruising, or bleeding gums    T(C): 36.6 (05-10-24 @ 13:53), Max: 36.6 (05-10-24 @ 05:26)  HR: 56 (05-10-24 @ 13:53) (56 - 66)  BP: 149/70 (05-10-24 @ 13:53) (122/70 - 151/74)  RR: 17 (05-10-24 @ 13:53) (16 - 18)  SpO2: 98% (05-10-24 @ 13:53) (94% - 100%)  Wt(kg): --Vital Signs Last 24 Hrs  T(C): 36.6 (10 May 2024 13:53), Max: 36.6 (10 May 2024 05:26)  T(F): 97.8 (10 May 2024 13:53), Max: 97.8 (10 May 2024 05:26)  HR: 56 (10 May 2024 13:53) (56 - 66)  BP: 149/70 (10 May 2024 13:53) (122/70 - 151/74)  BP(mean): --  RR: 17 (10 May 2024 13:53) (16 - 18)  SpO2: 98% (10 May 2024 13:53) (94% - 100%)    Parameters below as of 10 May 2024 13:53  Patient On (Oxygen Delivery Method): room air        MEDICATIONS  (STANDING):  acetaminophen     Tablet .. 1000 milliGRAM(s) Oral every 8 hours  ampicillin/sulbactam  IVPB      ampicillin/sulbactam  IVPB 3 Gram(s) IV Intermittent every 6 hours  atorvastatin 20 milliGRAM(s) Oral at bedtime  buMETAnide 2 milliGRAM(s) Oral daily  carvedilol 3.125 milliGRAM(s) Oral every 12 hours  enoxaparin Injectable 60 milliGRAM(s) SubCutaneous every 12 hours  insulin glargine Injectable (LANTUS) 50 Unit(s) SubCutaneous at bedtime  insulin lispro (ADMELOG) corrective regimen sliding scale   SubCutaneous three times a day before meals  insulin lispro (ADMELOG) corrective regimen sliding scale   SubCutaneous at bedtime  insulin lispro Injectable (ADMELOG) 12 Unit(s) SubCutaneous three times a day before meals  lidocaine   4% Patch 3 Patch Transdermal daily  melatonin 6 milliGRAM(s) Oral at bedtime  methocarbamol 750 milliGRAM(s) Oral every 8 hours  montelukast 10 milliGRAM(s) Oral at bedtime  pantoprazole    Tablet 40 milliGRAM(s) Oral before breakfast  polyethylene glycol 3350 17 Gram(s) Oral daily  pregabalin 100 milliGRAM(s) Oral three times a day  senna 2 Tablet(s) Oral at bedtime    MEDICATIONS  (PRN):  acetaminophen     Tablet .. 650 milliGRAM(s) Oral every 6 hours PRN Temp greater or equal to 38C (100.4F), Mild Pain (1 - 3)  aluminum hydroxide/magnesium hydroxide/simethicone Suspension 30 milliLiter(s) Oral every 4 hours PRN Dyspepsia  HYDROmorphone  Injectable 1 milliGRAM(s) IV Push every 4 hours PRN Pain  ondansetron Injectable 4 milliGRAM(s) IV Push every 8 hours PRN Nausea and/or Vomiting  oxyCODONE    IR 10 milliGRAM(s) Oral every 6 hours PRN Moderate Pain (4 - 6)      PHYSICAL EXAM:  GENERAL: NAD  EYES: clear conjunctiva  ENMT: +right facial swelling, no erythema. Moist mucous membranes  NECK: Supple, No JVD, Normal thyroid  CHEST/LUNG: Clear to auscultation bilaterally; No rales, rhonchi, wheezing, or rubs  HEART: S1, S2, Regular rate and rhythm  ABDOMEN: Soft, Nontender, Nondistended; Bowel sounds present  NEURO: Alert & Oriented X3  EXTREMITIES: No LE edema, no calf tenderness  LYMPH: No lymphadenopathy noted  SKIN: No rashes or lesions  MSK: +lower lumbar and right flank tenderness    Consultant(s) Notes Reviewed:  [x ] YES  [ ] NO  Care Discussed with Consultants/Other Providers [ x] YES  [ ] NO    LABS:                        10.8   6.71  )-----------( 359      ( 10 May 2024 06:40 )             33.2     05-10    137  |  102  |  17  ----------------------------<  392<H>  3.3<L>   |  28  |  1.13    Ca    7.8<L>      10 May 2024 06:40  Phos  3.7     05-09  Mg     1.4     05-09    TPro  6.8  /  Alb  2.6<L>  /  TBili  0.3  /  DBili  x   /  AST  25  /  ALT  34  /  AlkPhos  143<H>  05-10      CAPILLARY BLOOD GLUCOSE      POCT Blood Glucose.: 212 mg/dL (10 May 2024 16:35)  POCT Blood Glucose.: 268 mg/dL (10 May 2024 11:35)  POCT Blood Glucose.: 354 mg/dL (10 May 2024 07:59)  POCT Blood Glucose.: 339 mg/dL (09 May 2024 20:53)        Urinalysis Basic - ( 10 May 2024 06:40 )    Color: x / Appearance: x / SG: x / pH: x  Gluc: 392 mg/dL / Ketone: x  / Bili: x / Urobili: x   Blood: x / Protein: x / Nitrite: x   Leuk Esterase: x / RBC: x / WBC x   Sq Epi: x / Non Sq Epi: x / Bacteria: x        RADIOLOGY & ADDITIONAL TESTS:  < from: Xray Chest 2 Views PA/Lat (05.08.24 @ 01:26) >    ACC: [id number] EXAM:  XR CHEST PA LAT 2V   ORDERED BY: TRACE ROYAL     PROCEDURE DATE:  05/08/2024          INTERPRETATION:  EXAM: XR CHEST PA AND LATERAL    INDICATION: R flan kpain HXR    COMPARISON: March 23, 2024    IMPRESSION: Some linear scarring and/or platelike atelectatic change in   the left and right chest. Some slight prominence to interstitial markings   in the right infrahilar region which could represent an inflammatory   infectious process and/or atelectatic change suggested on the frontal   view. Heart is within normal limits in its transthoracic diameter.   Regional osseous structures appropriate for age    --- End of Report ---            MARK BECK MD; Attending Radiologist  This document has been electronically signed. May  8 2024  9:00AM    < end of copied text >  < from: CT Abdomen and Pelvis No Cont (05.08.24 @ 06:31) >    ACC: [id number] EXAM:  CT ABDOMEN AND PELVIS   ORDERED BY: TRACE ROYAL     PROCEDURE DATE:  05/08/2024          INTERPRETATION:  CLINICAL INFORMATION: Right flank pain    COMPARISON: CT abdomen pelvis 1/22/2023    CONTRAST/COMPLICATIONS:  IV Contrast: None  Oral Contrast: None  Complications: None    PROCEDURE:  CT of the Abdomen and Pelvis was performed.  Sagittal and coronal reformats were performed.    FINDINGS:  LOWER CHEST: Aortic root, coronary artery, mitral annular calcifications.   Linear subsegmental atelectasis in the lingula.    LIVER: Within normal limits.  BILE DUCTS: Normal caliber.  GALLBLADDER: Cholecystectomy.  SPLEEN: Within normal limits.  PANCREAS: Within normal limits.  ADRENALS: Within normal limits.  KIDNEYS/URETERS: Punctate nonobstructing stone in the interpolar region   of the right kidney. No hydronephrosis or perinephric inflammation.    BLADDER: Within normal limits.  REPRODUCTIVE ORGANS: Intrauterine device. No gross adnexal masses.    BOWEL: Duodenal diverticulum. No bowel obstruction. Appendix is normal.   Moderate stool in the rectosigmoid colon. No bowel wall thickening or   pericolonic inflammatory change.  PERITONEUM: No ascites.  VESSELS: Mild atherosclerotic changes.  RETROPERITONEUM/LYMPH NODES: No lymphadenopathy.  ABDOMINAL WALL: Small fat-containing umbilical hernia. Spinal stimulator   generator in the right flank subcutaneous soft tissues. Mild diffuse   anasarca.  BONES: Degenerative changes. Spinal stimulator electrodes.    IMPRESSION:  No evidence for obstructive uropathy.        --- End of Report ---            MILE COSTELLO MD; Attending Radiologist  This document has been electronically signed. May  8 2024  6:54AM    < end of copied text >  < from: CT Maxillofacial w/ IV Cont (05.08.24 @ 14:20) >    ACC: [id number] EXAM:  CT MAXILLOFACIAL  IC   ORDERED BY: BEN HANKS     PROCEDURE DATE:  05/08/2024          INTERPRETATION:  Clinical indication: Cheeks swelling.    Serial thin sections on a multi slice scanner were obtained through the   orbits and paranasal sinuses after contrast infusion with sagittal and   coronal computer-generated reconstructed views. 90 cc of Omnipaque 350   were intravenously injected, 10 cc were discarded.    Comparison is made with the prior maxillofacial CT of7/20/2023.    There is soft tissue swelling overlying the maxilla with inflammatory   changes in the fat. There is a air containing collection within this area   of soft tissue swelling. The collection measures 2.1 cm in AP diameter by   2.6 cm transversely by 2.8 cm in craniocaudal diameter. The air   collection measures 1.6 cm in AP diameter by 2 cm transversely by 2.8 cm   in craniocaudal diameter. This is consistent with an abscess with air   which may be iatrogenic.    There is no adjacent bone destruction. There is no adjacent periodontal   disease. There is a retention cyst or polyp in the right maxillary sinus.   The malar body and zygomatic arch is intact. The nasal septum is midline.   The left maxillary sinus and ethmoid sphenoid and frontal sinuses are   clear.    The visualized vascular structures are intact. The uvula is normal, the   epiglottis is normal. The visualized salivary glands are normal.    IMPRESSION: Soft tissue swelling with air containing collection overlying   the right malar body and the premaxillary space consistent with an   air-containing abscess which is new since 7/20/2023.    --- End of Report ---            ELMA SIDHU MD; Attending Radiologist  This document has been electronically signed. May8 2024  4:35PM    < end of copied text >    Imaging Personally Reviewed:  [ ] YES  [ ] NO

## 2024-05-10 NOTE — PROGRESS NOTE ADULT - PROBLEM SELECTOR PLAN 8
-pending improvement in pain Lovenox  60mg BID (adjusted for BMI > 50) for DVT ppx Pt has a history of HLD, takes atorvastatin at home  - C/w statin

## 2024-05-10 NOTE — PROGRESS NOTE ADULT - ASSESSMENT
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 Admitted for intractable pain management and right facial swelling. Pt is s/p lipoma resection 4/15 [facility]. pt had drainage and had needle placed in cheek by outpatient plastic surgeon, no further drainage was noted by plastic surgeon.     CT abdomen/Pelvis showed no obstructive uropathy, but noted for right kidney stone, IUD, moderate stool, right flank spinal stimulator. CT maxillofacial showed  soft tissue swelling with air containing collection overlying the right malar body and the premaxillary space consistent with an air-containing abscess which is new since 7/20/2023.  Pt remains on IV unasyn, awaiting improvement of right flank pain, Pain management following.

## 2024-05-10 NOTE — PROGRESS NOTE ADULT - PROBLEM SELECTOR PLAN 4
Pt has a history of CHF, takes Bumex 2mg daily and coreg 3.125mg BID and lisinopril 5mg daily  - hold lisinopril for now, as patient BP is controlled, intermittent elevated due to pain and pt requires IV Dilaudid  - restart lisinopril on dc   - C/w Home meds Pt has a history of DM, takes Tresiba 54u qhs and 12u Novolog TID and Metformin 1000mg BID at home  - glucose elevated this am 392  - C/w mod ISS  - increase Lantus 50u qhs  - increase admelog 12 units tid before meals  - FS ACHS  - monitor for improvement

## 2024-05-10 NOTE — PROGRESS NOTE ADULT - PROBLEM SELECTOR PLAN 3
Pt has a history of DM, takes Tresiba 54u qhs and 12u Novolog TID and Metformin 1000mg BID at home  - glucose elevated this am 392  - C/w mod ISS  - increase Lantus 50u qhs  - increase admelog 12 units tid before meals  - FS ACHS  - monitor for improvement -s/p lipoma resection 4/15 and [facility]. Checked up on last week, had needle placed in cheek, no further drainage  mild fever at home, now with slightly increased swelling  -c/w unasyn  -add viscous lidocaine q4h PRN  -send blood culture if patient is febrile  -trend esr and crp  -ID Dr. Short following   -has outpt plastic follow up on 5/14 for possible additional drainage

## 2024-05-10 NOTE — PROGRESS NOTE ADULT - PROBLEM SELECTOR PLAN 1
Pt with chronic right flank pain which is somatic and neuropathic in nature due to history of complex regional pain syndrome and right intercostal neuralgia. Since her dx tried numerous medications/interventions without relief, including (amitriptyline and duloxetine which caused patti, max dose of gabapentin, and epidural spinal injections). She was recommended a spinal cord stimulator which was placed in 3/23, however she reports that it is not effective in managing her symptoms. She does not currently have an outpatient pain management provider and has been relying on her PCP to prescribe opioids to manage her pain. Recommend neurology consult.   Opioid pain recommendations   - Start Dilaudid 1 mg IV q4h PRN for severe pain for acute flare pain. Continue Oxycodone 10 mg PO q 6 hours PRN moderate pain. Monitor for sedation/ respiratory depression.   Non-opioid pain recommendations   - Discontinue Tizanidine 4mg q 8hrs. Start Robaxin 750 mg po q8h x 5 days. Monitor drowsiness.  - Continue Acetaminophen 1 gram PO q 8 hours x 3 days. Monitor LFTs  - Continue Lyrica 100mg q 8hrs. (home med)  - Continue Lidoderm 4% patch daily. x 3 (12 hrs on/12 hrs off)  Bowel Regimen  - Continue Miralax 17G PO daily and - Continue Senna 2 tablets at bedtime for constipation  Mild pain (score 1-3)  - Non-pharmacological pain treatment recommendations  - Warm/ Cool packs PRN   - Repositioning extremity, elevation, imagery, relaxation, distraction.  - Physical therapy OOB if no contraindications   Recommendations discussed with primary team and RN.    *** Patient can follow up with Dr. Hay- [phone], upon discharge from hospital ***.

## 2024-05-10 NOTE — PROGRESS NOTE ADULT - PROBLEM SELECTOR PLAN 1
-Pt has a history of CRPS type 1, diagnosed 8 yrs ago. Used to follow with pain management however left due to complications with physician, has not be able to find another pain doctor since  -Presents with right flank pain, radiating in the back area. Pt states this is day 3 of her flare up  -CT A/P no obstructive uropathy, but noted for right kidney stone, IUD, moderate stool, right flank spinal stimulator  -Pain management following -Pt has a history of CRPS type 1, diagnosed 8 yrs ago. Used to follow with pain management however left due to complications with physician, has not be able to find another pain doctor since  -Presents with right flank pain, radiating in the back area. Pt states this is day 3 of her flare up  -CT A/P no obstructive uropathy, but noted for right kidney stone, IUD, moderate stool, right flank spinal stimulator  -bowel regimen: senna and miralax  -Pain management following

## 2024-05-10 NOTE — PROGRESS NOTE ADULT - PROBLEM SELECTOR PLAN 6
Pt has a history of HLD, takes atorvastatin at home  - C/w statin Pt has a history of HTN, takes Bumex 2mg daily and coreg 3.125mg BID and lisinopril 5mg at home  as above Pt has a history of CHF, takes Bumex 2mg daily and coreg 3.125mg BID and lisinopril 5mg daily  - hold lisinopril for now, as patient BP is controlled, intermittent elevated due to pain and pt requires IV Dilaudid  - restart lisinopril on dc   - C/w Home meds

## 2024-05-10 NOTE — PROGRESS NOTE ADULT - PROBLEM SELECTOR PLAN 2
-s/p lipoma resection 4/15 and [facility]. Checked up on last week, had needle placed in cheek, no further drainage  mild fever at home, now with slightly increased swelling  -c/w unasyn  -add viscous lidocaine q4h PRN  -send blood culture if patient is febrile  -trend esr and crp  -ID Dr. Short following   -has outpt plastic follow up on 5/14 for possible additional drainage CT A/P - Punctate nonobstructing stone in the interpolar region of the right kidney. No hydronephrosis or perinephric inflammation.  - needs outpatient urology follow up

## 2024-05-10 NOTE — PROGRESS NOTE ADULT - ASSESSMENT
**** in progress Subjective: NAD, R facial swelling improving, pain better controlled, afebrile, no new symptoms or complains.    REVIEW OF SYSTEMS:  CONSTITUTIONAL:  No fevers or chills  EYES/ENT:  No visual changes; no throat pain, R facial swelling and pain but better  NECK:  No neck pain or stiffness  RESPIRATORY:  No cough, no wheezing. No shortness of breath  CARDIOVASCULAR:  No chest pain or palpitations  GASTROINTESTINAL:  No abdominal pain. No N/V or diarrhea  GENITOURINARY:  No dysuria, frequency or hematuria  NEUROLOGICAL:  No numbness or weakness  MSK: no back pain, no joint pain  SKIN:  No itching, no skin rash    PE:  Vital Signs Last 24 Hrs  T(C): 36.3 (11 May 2024 04:45), Max: 36.7 (10 May 2024 20:50)  T(F): 97.4 (11 May 2024 04:45), Max: 98.1 (10 May 2024 20:50)  HR: 60 (11 May 2024 05:41) (52 - 60)  BP: 141/59 (11 May 2024 04:45) (141/59 - 152/68)  RR: 18 (11 May 2024 05:41) (17 - 18)  SpO2: 95% (11 May 2024 05:41) (94% - 98%)    Parameters below as of 11 May 2024 05:41  Patient On (Oxygen Delivery Method): room air    Gen: AOx3, NAD, non-toxic, pleasant  HEAD:  Atraumatic  EYES: PERRLA, conjunctiva and sclera clear  ENT: Moist mucous membranes  R facial swelling improving, less tender  NECK: Supple, No JVD  CV: S1+S2 normal, no murmurs  Resp: Clear bilat, no resp distress, no crackles/wheezes  Abd: Soft, nontender, +BS  Ext: No LE edema, no cyanosis, pulses +  : No dysuria  IV/Skin: No thrombophlebitis  Msk: No low back pain, no arthralgias, no joint swelling  Neuro: AAOx3. No focal signs     LABS/DIAGNOSTIC TESTS:                        10.8   6.71  )-----------( 359      ( 10 May 2024 06:40 )             33.2     WBC Count: 6.71 K/uL (05-10 @ 06:40)  WBC Count: 9.83 K/uL (05-09 @ 05:27)    05-11    138  |  105  |  11  ----------------------------<  176<H>  2.9<LL>   |  29  |  0.70    Ca    7.8<L>      11 May 2024 06:00    TPro  6.8  /  Alb  2.6<L>  /  TBili  0.3  /  DBili  x   /  AST  25  /  ALT  34  /  AlkPhos  143<H>  05-10    Sedimentation Rate, Erythrocyte: 28 mm/Hr *H* (05-10-24 @ 06:40)  C-Reactive Protein: 12 mg/L *H* (05-10-24 @ 06:40)    Urine Microscopic-Add On (NC) (05.08.24 @ 04:47)    Comment - Urine: Occasional budding yeast and yeast-like cells   Squamous Epithelial Cells: Present   Red Blood Cell - Urine: 2 /HPF   White Blood Cell - Urine: 15 /HPF   Bacteria: Few /HPF    CULTURES:   Culture - Urine (collected 05-08-24 @ 04:47)  Source: Clean Catch  Final Report (05-10-24 @ 11:09):    >=3 organisms. Probable collection contamination.    RADIOLOGY: available relevant imaging reviewed    ANTIBIOTICS:  ampicillin/sulbactam  IVPB    ampicillin/sulbactam  IVPB 3 every 6 hours    IMPRESSION:  53yo F with PMHx of DM, HTN, HLD, CHF, complex regional pain syndrome of right back intercostal muscles s/p spinal cord stimulator (Mar 2023) presents with chief complaint of flare up of her complex regional pain syndrome admitted for periodontal abscess after      CT Maxillofacial w/ IV Cont (05.08.24 @ 14:20) There is soft tissue swelling overlying the maxilla with inflammatory changes in the fat. There is a air containing collection within this area of soft tissue swelling ~ 2.1 cm this is consistent with an abscess with air which may be iatrogenic. There is no adjacent bone destruction. There is no adjacent periodontal disease.     -Periodontal abscess(s/p lipoma resection 4/15 R facial mass, cheek) excision via intraoral approach developing post OP infection)  -DM     PLAN:  Continue Unasyn 3g q6 hrs IV  Case discussed with Plastic sx team who will follow up OP next week, they favor OP management if pt clinically doing better)  Upon discharge can transition to Augmentin 875/125 mg q12 hrs PO for total 14 days  DM control  Pain management  Pt was counseled  Discussed with Dr. Yang    Please reach ID with any questions or concerns  Dr. Cha Rao  Available in Teams

## 2024-05-10 NOTE — PROGRESS NOTE ADULT - ASSESSMENT
Search Terms: Fauzia Linares, 1970Search Date: 05/08/2024 09:30:53 AM  The Drug Utilization Report below displays all of the controlled substance prescriptions, if any, that your patient has filled in the last twelve months. The information displayed on this report is compiled from pharmacy submissions to the Department, and accurately reflects the information as submitted by the pharmacies.    This report was requested by: Danni Silva | Reference #: [id number]    Practitioner Count: 5  Pharmacy Count: 2  Current Opioid Prescriptions: 1  Current Benzodiazepine Prescriptions: 0  Current Stimulant Prescriptions: 0      Patient Demographic Information (PDI)       PDI	First Name	Last Name	Birth Date	Gender	Street Address	[facility]	Zip Code  A	Fauzia Linares	1970	Female	[address]	[place]	[address]  B	Fauzia Linares	1970	Female	[address]	[place]	[address]  C	Fauzia Linares	1970	Female	[address]	[facility]	[place]	[address]  D	Fauzia Linares	1970	Female	[address]	[facility]	[place]	[address]  E	Fauzia Linares	1970	Female	[address]	[facility]	[place]	[address]  F	Fauzia Linares	1970	Female	[address]	[facility]	[place]	[address]  G	Fauzia Linares	1970	Female	[address]	[place]	[address]    Prescription Information      PDI Filter:    PDI	My Rx	Current Rx	Drug Type	Rx Written	Rx Dispensed	Drug	Quantity	Days Supply	Prescriber Name	Prescriber CROW #	Payment Method	Dispenser  A	N	N	O	03/26/2024	03/26/2024	oxycodone hcl (ir) 5 mg tablet	30	5	Quinones, Elizabeth MERLOS	[id number]	[facility]	N	N	O	03/26/2024	03/26/2024	oxycodone hcl (ir) 10 mg tab	30	5	Quinones, Elizabeth MERLOS	[id number]	[facility]  A	N	N	O	03/26/2024	03/26/2024	oxycontin er 10 mg tablet	10	5	Quinones, Elizabeth MERLOS	[id number]	[facility]  B	N	N		10/06/2023	10/07/2023	pregabalin 100 mg capsule	90	30	Tc, Reba	[id number]	Medicare	[facility]  B	N	N	O	09/28/2023	10/01/2023	oxycodone hcl (ir) 15 mg tab	30	5	Tc, Reba	[id number]	Medicare	[facility]  B	N	N	O	09/27/2023	09/27/2023	oxycodone hcl (ir) 15 mg tab	30	8	Tc, Reba	[id number]	Medicare	[facility]  B	N	N	O	09/10/2023	09/10/2023	oxycodone hcl (ir) 15 mg tab	30	5	Tc, Reba	[id number]	Medicare	[facility]  B	N	N		09/06/2023	09/06/2023	pregabalin 100 mg capsule	90	30	Tc, Reba	[id number]	Medicare	[facility]  B	N	N	O	08/27/2023	08/28/2023	oxycodone hcl (ir) 15 mg tab	30	5	Tc, Reba	[id number]	Medicare	[facility]  B	N	N	O	08/12/2023	08/13/2023	oxycodone hcl (ir) 15 mg tab	30	5	Tc, Reba	[id number]	Medicare	[facility]  B	N	N		08/04/2023	08/05/2023	pregabalin 100 mg capsule	90	30	Tc, Reba	[id number]	Medicare	[facility]  B	N	N		07/21/2023	07/22/2023	pregabalin 100 mg capsule	42	14	Tc, Reba	[id number]	Medicare	[facility]  B	N	N	O	07/16/2023	07/16/2023	oxycodone hcl (ir) 15 mg tab	30	7	Tc, Reba	[id number]	Medicare	[facility]  B	N	N	O	06/30/2023	07/01/2023	oxycodone hcl (ir) 15 mg tab	42	10	Tc, Reba	[id number]	Medicare	[facility]  B	N	N		06/19/2023	06/20/2023	pregabalin 100 mg capsule	90	30	Tc, Reba	[id number]	Medicare	[facility]  B	N	N	O	06/06/2023	06/07/2023	oxycodone hcl (ir) 15 mg tab	42	7	Tc, Reba	[id number]	Medicare	[facility]  B	N	N	O	05/22/2023	05/23/2023	oxycodone hcl (ir) 15 mg tab	42	10	Tc, Reba	[id number]	Medicare	[facility]  B	N	N		05/19/2023	05/20/2023	pregabalin 100 mg capsule	90	30	Tc, Reba	[id number]	Medicare	[facility]  C	N	N	O	11/28/2023	11/30/2023	oxycodone hcl (ir) 20 mg tab	60	30	Vinny Maldonado MD	[id number]	Insurance	[id number]  C	N	N	O	11/16/2023	11/18/2023	oxycodone-acetaminophen  mg tab	30	15	Vinny Maldonado MD	[id number]	Insurance	[id number]  C	N	N		11/16/2023	11/18/2023	pregabalin 100 mg capsule	90	30	Vinny Maldonado MD	[id number]	Insurance	[id number]  C	N	N		09/28/2023	11/01/2023	pregabalin 100 mg capsule	30	10	Tc, Reba	[id number]	Insurance	[id number]  D	N	N	O	12/29/2023	12/29/2023	oxycodone hcl (ir) 15 mg tab	12	3	Christopher Montiel	[id number]	Insurance	[facility]  D	N	N		12/29/2023	12/29/2023	butalbital-acetaminophen-caffeine -40 mg tablet	53	8	Christopher Montiel	[id number]	Insurance	[facility]	N	N	O	10/12/2023	10/14/2023	oxycodone hcl (ir) 15 mg tab	28	7	Naomi Ybarra	[id number]	Insurance	[id number]  E	N	N		02/08/2024	02/09/2024	pregabalin 100 mg capsule	42	7	Alba Jasso M	[id number]	Insurance	[facility]  E	N	N	O	02/08/2024	02/09/2024	hydromorphone 4 mg tablet	20	5	Alba Jasso M	[id number]	[facility]  E	N	N		01/25/2024	01/26/2024	pregabalin 100 mg capsule	90	30	Vinny Maldonado MD	[id number]	[facility]	N	N	O	01/25/2024	01/26/2024	oxycodone hcl (ir) 20 mg tab	60	30	Vinny Maldonado MD	[id number]	[facility]	N	Y		04/03/2024	05/06/2024	pregabalin 100 mg capsule	90	30	Vinny Maldonado MD	[id number]	[facility]	N	Y	O	04/30/2024	05/02/2024	oxycodone hcl (ir) 20 mg tab	60	30	Vinny Maldonado MD	[id number]	[facility]	N	N	O	04/16/2024	04/17/2024	oxycodone hcl (ir) 5 mg tablet	10	3	Frank Ba	[id number]	[facility]	N	N		04/03/2024	04/04/2024	pregabalin 100 mg capsule	90	30	Vinny Maldonado MD	[id number]	[facility]	N	N	O	03/04/2024	03/05/2024	hydromorphone 4 mg tablet	20	5	Bonny Bryant	[id number]	[facility]  G	N	N		09/28/2023	10/02/2023	pregabalin 100 mg capsule	30	10	Reba Montez	[id number]	[id number]    * - Details of Drug Type : O = Opioid, B = Benzodiazepine, S = Stimulant    * - Drugs marked with an asterisk are compound drugs. If the compound drug is made up of more than one controlled substance, then each controlled substance will be a separate row in the table.

## 2024-05-11 LAB
ANION GAP SERPL CALC-SCNC: 4 MMOL/L — LOW (ref 5–17)
ANION GAP SERPL CALC-SCNC: 5 MMOL/L — SIGNIFICANT CHANGE UP (ref 5–17)
BUN SERPL-MCNC: 11 MG/DL — SIGNIFICANT CHANGE UP (ref 7–18)
BUN SERPL-MCNC: 9 MG/DL — SIGNIFICANT CHANGE UP (ref 7–18)
CALCIUM SERPL-MCNC: 7.8 MG/DL — LOW (ref 8.4–10.5)
CALCIUM SERPL-MCNC: 7.9 MG/DL — LOW (ref 8.4–10.5)
CHLORIDE SERPL-SCNC: 105 MMOL/L — SIGNIFICANT CHANGE UP (ref 96–108)
CHLORIDE SERPL-SCNC: 107 MMOL/L — SIGNIFICANT CHANGE UP (ref 96–108)
CO2 SERPL-SCNC: 29 MMOL/L — SIGNIFICANT CHANGE UP (ref 22–31)
CO2 SERPL-SCNC: 29 MMOL/L — SIGNIFICANT CHANGE UP (ref 22–31)
CREAT SERPL-MCNC: 0.7 MG/DL — SIGNIFICANT CHANGE UP (ref 0.5–1.3)
CREAT SERPL-MCNC: 0.9 MG/DL — SIGNIFICANT CHANGE UP (ref 0.5–1.3)
EGFR: 103 ML/MIN/1.73M2 — SIGNIFICANT CHANGE UP
EGFR: 76 ML/MIN/1.73M2 — SIGNIFICANT CHANGE UP
GLUCOSE BLDC GLUCOMTR-MCNC: 171 MG/DL — HIGH (ref 70–99)
GLUCOSE BLDC GLUCOMTR-MCNC: 191 MG/DL — HIGH (ref 70–99)
GLUCOSE BLDC GLUCOMTR-MCNC: 202 MG/DL — HIGH (ref 70–99)
GLUCOSE BLDC GLUCOMTR-MCNC: 246 MG/DL — HIGH (ref 70–99)
GLUCOSE SERPL-MCNC: 176 MG/DL — HIGH (ref 70–99)
GLUCOSE SERPL-MCNC: 201 MG/DL — HIGH (ref 70–99)
MAGNESIUM SERPL-MCNC: 1.4 MG/DL — LOW (ref 1.6–2.6)
POTASSIUM SERPL-MCNC: 2.9 MMOL/L — CRITICAL LOW (ref 3.5–5.3)
POTASSIUM SERPL-MCNC: 3.5 MMOL/L — SIGNIFICANT CHANGE UP (ref 3.5–5.3)
POTASSIUM SERPL-SCNC: 2.9 MMOL/L — CRITICAL LOW (ref 3.5–5.3)
POTASSIUM SERPL-SCNC: 3.5 MMOL/L — SIGNIFICANT CHANGE UP (ref 3.5–5.3)
SODIUM SERPL-SCNC: 138 MMOL/L — SIGNIFICANT CHANGE UP (ref 135–145)
SODIUM SERPL-SCNC: 141 MMOL/L — SIGNIFICANT CHANGE UP (ref 135–145)

## 2024-05-11 PROCEDURE — 99233 SBSQ HOSP IP/OBS HIGH 50: CPT

## 2024-05-11 RX ORDER — POTASSIUM CHLORIDE 20 MEQ
40 PACKET (EA) ORAL ONCE
Refills: 0 | Status: DISCONTINUED | OUTPATIENT
Start: 2024-05-11 | End: 2024-05-12

## 2024-05-11 RX ORDER — MAGNESIUM SULFATE 500 MG/ML
2 VIAL (ML) INJECTION ONCE
Refills: 0 | Status: DISCONTINUED | OUTPATIENT
Start: 2024-05-11 | End: 2024-05-12

## 2024-05-11 RX ORDER — LACTULOSE 10 G/15ML
20 SOLUTION ORAL THREE TIMES A DAY
Refills: 0 | Status: DISCONTINUED | OUTPATIENT
Start: 2024-05-11 | End: 2024-05-18

## 2024-05-11 RX ORDER — POTASSIUM CHLORIDE 20 MEQ
10 PACKET (EA) ORAL
Refills: 0 | Status: COMPLETED | OUTPATIENT
Start: 2024-05-11 | End: 2024-05-11

## 2024-05-11 RX ADMIN — LIDOCAINE 3 PATCH: 4 CREAM TOPICAL at 19:27

## 2024-05-11 RX ADMIN — AMPICILLIN SODIUM AND SULBACTAM SODIUM 200 GRAM(S): 250; 125 INJECTION, POWDER, FOR SUSPENSION INTRAMUSCULAR; INTRAVENOUS at 00:26

## 2024-05-11 RX ADMIN — HYDROMORPHONE HYDROCHLORIDE 1 MILLIGRAM(S): 2 INJECTION INTRAMUSCULAR; INTRAVENOUS; SUBCUTANEOUS at 01:35

## 2024-05-11 RX ADMIN — ATORVASTATIN CALCIUM 20 MILLIGRAM(S): 80 TABLET, FILM COATED ORAL at 21:32

## 2024-05-11 RX ADMIN — OXYCODONE HYDROCHLORIDE 10 MILLIGRAM(S): 5 TABLET ORAL at 22:36

## 2024-05-11 RX ADMIN — Medication 6 MILLIGRAM(S): at 21:32

## 2024-05-11 RX ADMIN — ENOXAPARIN SODIUM 60 MILLIGRAM(S): 100 INJECTION SUBCUTANEOUS at 17:48

## 2024-05-11 RX ADMIN — Medication 100 MILLIEQUIVALENT(S): at 08:57

## 2024-05-11 RX ADMIN — Medication 100 MILLIGRAM(S): at 05:45

## 2024-05-11 RX ADMIN — ENOXAPARIN SODIUM 60 MILLIGRAM(S): 100 INJECTION SUBCUTANEOUS at 06:44

## 2024-05-11 RX ADMIN — Medication 100 MILLIEQUIVALENT(S): at 10:11

## 2024-05-11 RX ADMIN — HYDROMORPHONE HYDROCHLORIDE 1 MILLIGRAM(S): 2 INJECTION INTRAMUSCULAR; INTRAVENOUS; SUBCUTANEOUS at 08:03

## 2024-05-11 RX ADMIN — SENNA PLUS 2 TABLET(S): 8.6 TABLET ORAL at 21:32

## 2024-05-11 RX ADMIN — Medication 12 UNIT(S): at 08:03

## 2024-05-11 RX ADMIN — Medication 100 MILLIGRAM(S): at 13:23

## 2024-05-11 RX ADMIN — Medication 2: at 08:03

## 2024-05-11 RX ADMIN — Medication 12 UNIT(S): at 11:39

## 2024-05-11 RX ADMIN — Medication 2: at 11:38

## 2024-05-11 RX ADMIN — Medication 650 MILLIGRAM(S): at 13:25

## 2024-05-11 RX ADMIN — MONTELUKAST 10 MILLIGRAM(S): 4 TABLET, CHEWABLE ORAL at 21:32

## 2024-05-11 RX ADMIN — Medication 100 MILLIEQUIVALENT(S): at 08:22

## 2024-05-11 RX ADMIN — PANTOPRAZOLE SODIUM 40 MILLIGRAM(S): 20 TABLET, DELAYED RELEASE ORAL at 06:44

## 2024-05-11 RX ADMIN — HYDROMORPHONE HYDROCHLORIDE 1 MILLIGRAM(S): 2 INJECTION INTRAMUSCULAR; INTRAVENOUS; SUBCUTANEOUS at 08:33

## 2024-05-11 RX ADMIN — Medication 1000 MILLIGRAM(S): at 06:49

## 2024-05-11 RX ADMIN — AMPICILLIN SODIUM AND SULBACTAM SODIUM 200 GRAM(S): 250; 125 INJECTION, POWDER, FOR SUSPENSION INTRAMUSCULAR; INTRAVENOUS at 13:23

## 2024-05-11 RX ADMIN — Medication 100 MILLIGRAM(S): at 21:31

## 2024-05-11 RX ADMIN — LIDOCAINE 3 PATCH: 4 CREAM TOPICAL at 11:40

## 2024-05-11 RX ADMIN — Medication 4: at 16:51

## 2024-05-11 RX ADMIN — AMPICILLIN SODIUM AND SULBACTAM SODIUM 200 GRAM(S): 250; 125 INJECTION, POWDER, FOR SUSPENSION INTRAMUSCULAR; INTRAVENOUS at 17:48

## 2024-05-11 RX ADMIN — HYDROMORPHONE HYDROCHLORIDE 1 MILLIGRAM(S): 2 INJECTION INTRAMUSCULAR; INTRAVENOUS; SUBCUTANEOUS at 23:56

## 2024-05-11 RX ADMIN — OXYCODONE HYDROCHLORIDE 10 MILLIGRAM(S): 5 TABLET ORAL at 23:30

## 2024-05-11 RX ADMIN — BUMETANIDE 2 MILLIGRAM(S): 0.25 INJECTION INTRAMUSCULAR; INTRAVENOUS at 06:44

## 2024-05-11 RX ADMIN — LACTULOSE 20 GRAM(S): 10 SOLUTION ORAL at 17:54

## 2024-05-11 RX ADMIN — Medication 650 MILLIGRAM(S): at 16:08

## 2024-05-11 RX ADMIN — HYDROMORPHONE HYDROCHLORIDE 1 MILLIGRAM(S): 2 INJECTION INTRAMUSCULAR; INTRAVENOUS; SUBCUTANEOUS at 19:50

## 2024-05-11 RX ADMIN — CARVEDILOL PHOSPHATE 3.12 MILLIGRAM(S): 80 CAPSULE, EXTENDED RELEASE ORAL at 17:52

## 2024-05-11 RX ADMIN — AMPICILLIN SODIUM AND SULBACTAM SODIUM 200 GRAM(S): 250; 125 INJECTION, POWDER, FOR SUSPENSION INTRAMUSCULAR; INTRAVENOUS at 05:44

## 2024-05-11 RX ADMIN — METHOCARBAMOL 750 MILLIGRAM(S): 500 TABLET, FILM COATED ORAL at 21:37

## 2024-05-11 RX ADMIN — INSULIN GLARGINE 50 UNIT(S): 100 INJECTION, SOLUTION SUBCUTANEOUS at 21:32

## 2024-05-11 RX ADMIN — HYDROMORPHONE HYDROCHLORIDE 1 MILLIGRAM(S): 2 INJECTION INTRAMUSCULAR; INTRAVENOUS; SUBCUTANEOUS at 00:30

## 2024-05-11 RX ADMIN — METHOCARBAMOL 750 MILLIGRAM(S): 500 TABLET, FILM COATED ORAL at 05:44

## 2024-05-11 RX ADMIN — CARVEDILOL PHOSPHATE 3.12 MILLIGRAM(S): 80 CAPSULE, EXTENDED RELEASE ORAL at 05:44

## 2024-05-11 RX ADMIN — AMPICILLIN SODIUM AND SULBACTAM SODIUM 200 GRAM(S): 250; 125 INJECTION, POWDER, FOR SUSPENSION INTRAMUSCULAR; INTRAVENOUS at 23:56

## 2024-05-11 RX ADMIN — METHOCARBAMOL 750 MILLIGRAM(S): 500 TABLET, FILM COATED ORAL at 13:23

## 2024-05-11 RX ADMIN — Medication 12 UNIT(S): at 16:51

## 2024-05-11 RX ADMIN — HYDROMORPHONE HYDROCHLORIDE 1 MILLIGRAM(S): 2 INJECTION INTRAMUSCULAR; INTRAVENOUS; SUBCUTANEOUS at 19:13

## 2024-05-11 RX ADMIN — Medication 1000 MILLIGRAM(S): at 05:45

## 2024-05-11 NOTE — PROGRESS NOTE ADULT - PROBLEM SELECTOR PLAN 3
-s/p lipoma resection 4/15 at [facility]. Checked up on last week, had needle placed in cheek, no further drainage  mild fever at home, now with slightly increased swelling  -c/w unasyn  -add viscous lidocaine q4h PRN  -send blood culture if patient is febrile  -trend esr and crp  -ID Dr. Short following   -has outpt plastic follow up on 5/14 for possible additional drainage  -Anticipate dc home to complete 14 day course of antibiotics with Augmentin

## 2024-05-11 NOTE — PROGRESS NOTE ADULT - PROBLEM SELECTOR PLAN 1
-Pt has a history of CRPS type 1, diagnosed 8 yrs ago. Used to follow with pain management however left due to complications with physician, has not be able to find another pain doctor since  -Presents with right flank pain, radiating in the back area.  -CT A/P no obstructive uropathy, but noted for right kidney stone, IUD, moderate stool, right flank spinal stimulator  -bowel regimen: senna and lactulose, patient reports she does not tolerate Miralax  -Pain management following, will follow-up additional recommendations  -Continue pain control with IV dilaudid PRN and po oxycodone PRN

## 2024-05-11 NOTE — PROGRESS NOTE ADULT - SUBJECTIVE AND OBJECTIVE BOX
[facility] Medicine    Patient is a 54y old  Female who presents with a chief complaint of Complex Regional Pain syndrome flare (10 May 2024 17:00)      SUBJECTIVE / OVERNIGHT EVENTS: Patient reports improvement in her R flank today but expresses frustration over the ongoing erythema and swelling to her R cheek. She is concerned that she may be getting worse despite antibiotics.    MEDICATIONS  (STANDING):  ampicillin/sulbactam  IVPB      ampicillin/sulbactam  IVPB 3 Gram(s) IV Intermittent every 6 hours  atorvastatin 20 milliGRAM(s) Oral at bedtime  buMETAnide 2 milliGRAM(s) Oral daily  carvedilol 3.125 milliGRAM(s) Oral every 12 hours  enoxaparin Injectable 60 milliGRAM(s) SubCutaneous every 12 hours  insulin glargine Injectable (LANTUS) 50 Unit(s) SubCutaneous at bedtime  insulin lispro (ADMELOG) corrective regimen sliding scale   SubCutaneous three times a day before meals  insulin lispro (ADMELOG) corrective regimen sliding scale   SubCutaneous at bedtime  insulin lispro Injectable (ADMELOG) 12 Unit(s) SubCutaneous three times a day before meals  lidocaine   4% Patch 3 Patch Transdermal daily  melatonin 6 milliGRAM(s) Oral at bedtime  methocarbamol 750 milliGRAM(s) Oral every 8 hours  montelukast 10 milliGRAM(s) Oral at bedtime  pantoprazole    Tablet 40 milliGRAM(s) Oral before breakfast  polyethylene glycol 3350 17 Gram(s) Oral daily  potassium chloride    Tablet ER 40 milliEquivalent(s) Oral once  pregabalin 100 milliGRAM(s) Oral three times a day  senna 2 Tablet(s) Oral at bedtime    MEDICATIONS  (PRN):  acetaminophen     Tablet .. 650 milliGRAM(s) Oral every 6 hours PRN Temp greater or equal to 38C (100.4F), Mild Pain (1 - 3)  aluminum hydroxide/magnesium hydroxide/simethicone Suspension 30 milliLiter(s) Oral every 4 hours PRN Dyspepsia  HYDROmorphone  Injectable 1 milliGRAM(s) IV Push every 4 hours PRN Pain  lactulose Syrup 20 Gram(s) Oral three times a day PRN Constipation  ondansetron Injectable 4 milliGRAM(s) IV Push every 8 hours PRN Nausea and/or Vomiting  oxyCODONE    IR 10 milliGRAM(s) Oral every 6 hours PRN Moderate Pain (4 - 6)      Vital Signs Last 24 Hrs  T(C): 36.7 (05-11-24 @ 14:30)  T(F): 98.1 (05-11-24 @ 14:30), Max: 98.1 (05-10-24 @ 20:50)  HR: 58 (05-11-24 @ 14:30) (52 - 60)  BP: 124/59 (05-11-24 @ 14:30)  BP(mean): --  RR: 18 (05-11-24 @ 14:30) (18 - 18)  SpO2: 97% (05-11-24 @ 14:30) (94% - 98%)  Wt(kg): --    CAPILLARY BLOOD GLUCOSE      POCT Blood Glucose.: 202 mg/dL (11 May 2024 16:39)  POCT Blood Glucose.: 171 mg/dL (11 May 2024 11:20)  POCT Blood Glucose.: 191 mg/dL (11 May 2024 07:55)  POCT Blood Glucose.: 171 mg/dL (10 May 2024 21:39)    I&O's Summary      PHYSICAL EXAM:  GENERAL: In mild distress 2/2 frustrations over right face, well-developed  HEAD:  Atraumatic, Normocephalic, erythema and swelling over the right cheek  EYES: conjunctiva and sclera clear  NECK: Supple, No JVD, enlarged circumference  CHEST/LUNG: Clear to auscultation bilaterally; No wheeze  HEART: Regular rate and rhythm; No murmurs, rubs, or gallops  ABDOMEN: Soft, Nontender, Nondistended; Bowel sounds present  EXTREMITIES:  2+ Peripheral Pulses, No clubbing, cyanosis, or edema  PSYCH: AAOx3, cooperative  NEUROLOGY: non-focal  SKIN: No rashes or lesions    LABS:                        10.8   6.71  )-----------( 359      ( 10 May 2024 06:40 )             33.2     05-11    138  |  105  |  11  ----------------------------<  176<H>  2.9<LL>   |  29  |  0.70    Ca    7.8<L>      11 May 2024 06:00    TPro  6.8  /  Alb  2.6<L>  /  TBili  0.3  /  DBili  x   /  AST  25  /  ALT  34  /  AlkPhos  143<H>  05-10          Urinalysis Basic - ( 11 May 2024 06:00 )    Color: x / Appearance: x / SG: x / pH: x  Gluc: 176 mg/dL / Ketone: x  / Bili: x / Urobili: x   Blood: x / Protein: x / Nitrite: x   Leuk Esterase: x / RBC: x / WBC x   Sq Epi: x / Non Sq Epi: x / Bacteria: x        RADIOLOGY & ADDITIONAL TESTS:    Imaging Personally Reviewed:    Consultant(s) Notes Reviewed:  ID     Care Discussed with Consultants/Other Providers: ID (Dr. Zhou) re: plan for facial abscess    Assessment and Plan:

## 2024-05-11 NOTE — PROGRESS NOTE ADULT - PROBLEM SELECTOR PLAN 7
Pt has a history of HTN, takes Bumex 2mg daily and coreg 3.125mg BID and lisinopril 5mg at home  as above

## 2024-05-11 NOTE — PROGRESS NOTE ADULT - PROBLEM SELECTOR PLAN 2
CT A/P - Punctate nonobstructing stone in the interpolar region of the right kidney. No hydronephrosis or perinephric inflammation.  - needs outpatient urology follow up

## 2024-05-11 NOTE — PROGRESS NOTE ADULT - ASSESSMENT
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 Admitted for intractable pain management and right facial swelling. Pt is s/p lipoma resection 4/15 [facility]. pt had drainage and had needle placed in cheek by outpatient plastic surgeon, no further drainage was noted by plastic surgeon.     CT abdomen/Pelvis showed no obstructive uropathy, but noted for right kidney stone, IUD, moderate stool, right flank spinal stimulator. CT maxillofacial showed  soft tissue swelling with air containing collection overlying the right malar body and the premaxillary space consistent with an air-containing abscess which is new since 7/20/2023.  Pt remains on IV unasyn, awaiting improvement of right flank pain, Pain management following.

## 2024-05-11 NOTE — PROGRESS NOTE ADULT - PROBLEM SELECTOR PLAN 6
Pt has a history of CHF, takes Bumex 2mg daily and coreg 3.125mg BID and lisinopril 5mg daily  - hold lisinopril for now, as patient BP is controlled, intermittent elevated due to pain and pt requires IV Dilaudid  - restart lisinopril on dc   - C/w Home meds

## 2024-05-11 NOTE — PROGRESS NOTE ADULT - PROBLEM SELECTOR PLAN 5
[FreeTextEntry1] : The patient is 76 year old male with sludge and gallstones with abdominal bloating and discomfort.  The patient has had resolution of the seromas and is ready to proceed with cholecystectomy as planned. A total of 30 minutes was spent coordinating the patient's care.  K 2.9  Repleted with po potassium and IV potassium  monitor BMP and Mg with repeat levels at 4pm

## 2024-05-11 NOTE — PROGRESS NOTE ADULT - PROBLEM SELECTOR PLAN 4
Pt has a history of DM, takes Tresiba 54u qhs and 12u Novolog TID and Metformin 1000mg BID at home  - glucose improved this AM at 191  - C/w mod ISS  - Continue Lantus 50u qhs  - Continue admelog 12 units tid before meals  - FS ACHS  - monitor for improvement

## 2024-05-12 ENCOUNTER — TRANSCRIPTION ENCOUNTER (OUTPATIENT)
Age: 54
End: 2024-05-12

## 2024-05-12 DIAGNOSIS — L02.01 CUTANEOUS ABSCESS OF FACE: ICD-10-CM

## 2024-05-12 LAB
ANION GAP SERPL CALC-SCNC: 8 MMOL/L — SIGNIFICANT CHANGE UP (ref 5–17)
BUN SERPL-MCNC: 9 MG/DL — SIGNIFICANT CHANGE UP (ref 7–18)
CALCIUM SERPL-MCNC: 7.8 MG/DL — LOW (ref 8.4–10.5)
CHLORIDE SERPL-SCNC: 107 MMOL/L — SIGNIFICANT CHANGE UP (ref 96–108)
CO2 SERPL-SCNC: 26 MMOL/L — SIGNIFICANT CHANGE UP (ref 22–31)
CREAT SERPL-MCNC: 0.8 MG/DL — SIGNIFICANT CHANGE UP (ref 0.5–1.3)
EGFR: 88 ML/MIN/1.73M2 — SIGNIFICANT CHANGE UP
GLUCOSE BLDC GLUCOMTR-MCNC: 145 MG/DL — HIGH (ref 70–99)
GLUCOSE BLDC GLUCOMTR-MCNC: 160 MG/DL — HIGH (ref 70–99)
GLUCOSE BLDC GLUCOMTR-MCNC: 226 MG/DL — HIGH (ref 70–99)
GLUCOSE BLDC GLUCOMTR-MCNC: 249 MG/DL — HIGH (ref 70–99)
GLUCOSE SERPL-MCNC: 245 MG/DL — HIGH (ref 70–99)
HCT VFR BLD CALC: 32.3 % — LOW (ref 34.5–45)
HGB BLD-MCNC: 10.6 G/DL — LOW (ref 11.5–15.5)
MAGNESIUM SERPL-MCNC: 1.5 MG/DL — LOW (ref 1.6–2.6)
MCHC RBC-ENTMCNC: 29.4 PG — SIGNIFICANT CHANGE UP (ref 27–34)
MCHC RBC-ENTMCNC: 32.8 GM/DL — SIGNIFICANT CHANGE UP (ref 32–36)
MCV RBC AUTO: 89.5 FL — SIGNIFICANT CHANGE UP (ref 80–100)
NRBC # BLD: 0 /100 WBCS — SIGNIFICANT CHANGE UP (ref 0–0)
PLATELET # BLD AUTO: 335 K/UL — SIGNIFICANT CHANGE UP (ref 150–400)
POTASSIUM SERPL-MCNC: 3.2 MMOL/L — LOW (ref 3.5–5.3)
POTASSIUM SERPL-SCNC: 3.2 MMOL/L — LOW (ref 3.5–5.3)
RBC # BLD: 3.61 M/UL — LOW (ref 3.8–5.2)
RBC # FLD: 13.2 % — SIGNIFICANT CHANGE UP (ref 10.3–14.5)
SODIUM SERPL-SCNC: 141 MMOL/L — SIGNIFICANT CHANGE UP (ref 135–145)
WBC # BLD: 10.22 K/UL — SIGNIFICANT CHANGE UP (ref 3.8–10.5)
WBC # FLD AUTO: 10.22 K/UL — SIGNIFICANT CHANGE UP (ref 3.8–10.5)

## 2024-05-12 PROCEDURE — 99233 SBSQ HOSP IP/OBS HIGH 50: CPT

## 2024-05-12 RX ORDER — MAGNESIUM SULFATE 500 MG/ML
2 VIAL (ML) INJECTION ONCE
Refills: 0 | Status: COMPLETED | OUTPATIENT
Start: 2024-05-12 | End: 2024-05-12

## 2024-05-12 RX ORDER — POTASSIUM CHLORIDE 20 MEQ
40 PACKET (EA) ORAL EVERY 4 HOURS
Refills: 0 | Status: COMPLETED | OUTPATIENT
Start: 2024-05-12 | End: 2024-05-12

## 2024-05-12 RX ORDER — LIDOCAINE 4 G/100G
1 CREAM TOPICAL
Qty: 5 | Refills: 0
Start: 2024-05-12 | End: 2024-06-10

## 2024-05-12 RX ORDER — INSULIN GLARGINE 100 [IU]/ML
54 INJECTION, SOLUTION SUBCUTANEOUS AT BEDTIME
Refills: 0 | Status: DISCONTINUED | OUTPATIENT
Start: 2024-05-12 | End: 2024-05-18

## 2024-05-12 RX ORDER — POTASSIUM CHLORIDE 20 MEQ
40 PACKET (EA) ORAL ONCE
Refills: 0 | Status: DISCONTINUED | OUTPATIENT
Start: 2024-05-12 | End: 2024-05-12

## 2024-05-12 RX ADMIN — Medication 4: at 08:21

## 2024-05-12 RX ADMIN — SENNA PLUS 2 TABLET(S): 8.6 TABLET ORAL at 21:22

## 2024-05-12 RX ADMIN — HYDROMORPHONE HYDROCHLORIDE 1 MILLIGRAM(S): 2 INJECTION INTRAMUSCULAR; INTRAVENOUS; SUBCUTANEOUS at 11:03

## 2024-05-12 RX ADMIN — OXYCODONE HYDROCHLORIDE 10 MILLIGRAM(S): 5 TABLET ORAL at 13:49

## 2024-05-12 RX ADMIN — HYDROMORPHONE HYDROCHLORIDE 1 MILLIGRAM(S): 2 INJECTION INTRAMUSCULAR; INTRAVENOUS; SUBCUTANEOUS at 05:30

## 2024-05-12 RX ADMIN — LIDOCAINE 3 PATCH: 4 CREAM TOPICAL at 19:31

## 2024-05-12 RX ADMIN — AMPICILLIN SODIUM AND SULBACTAM SODIUM 200 GRAM(S): 250; 125 INJECTION, POWDER, FOR SUSPENSION INTRAMUSCULAR; INTRAVENOUS at 23:21

## 2024-05-12 RX ADMIN — HYDROMORPHONE HYDROCHLORIDE 1 MILLIGRAM(S): 2 INJECTION INTRAMUSCULAR; INTRAVENOUS; SUBCUTANEOUS at 10:16

## 2024-05-12 RX ADMIN — Medication 12 UNIT(S): at 08:22

## 2024-05-12 RX ADMIN — Medication 4: at 11:34

## 2024-05-12 RX ADMIN — CARVEDILOL PHOSPHATE 3.12 MILLIGRAM(S): 80 CAPSULE, EXTENDED RELEASE ORAL at 17:34

## 2024-05-12 RX ADMIN — Medication 100 MILLIGRAM(S): at 13:58

## 2024-05-12 RX ADMIN — HYDROMORPHONE HYDROCHLORIDE 1 MILLIGRAM(S): 2 INJECTION INTRAMUSCULAR; INTRAVENOUS; SUBCUTANEOUS at 21:00

## 2024-05-12 RX ADMIN — BUMETANIDE 2 MILLIGRAM(S): 0.25 INJECTION INTRAMUSCULAR; INTRAVENOUS at 05:02

## 2024-05-12 RX ADMIN — LIDOCAINE 3 PATCH: 4 CREAM TOPICAL at 11:35

## 2024-05-12 RX ADMIN — AMPICILLIN SODIUM AND SULBACTAM SODIUM 200 GRAM(S): 250; 125 INJECTION, POWDER, FOR SUSPENSION INTRAMUSCULAR; INTRAVENOUS at 11:34

## 2024-05-12 RX ADMIN — HYDROMORPHONE HYDROCHLORIDE 1 MILLIGRAM(S): 2 INJECTION INTRAMUSCULAR; INTRAVENOUS; SUBCUTANEOUS at 02:39

## 2024-05-12 RX ADMIN — Medication 40 MILLIEQUIVALENT(S): at 10:19

## 2024-05-12 RX ADMIN — ENOXAPARIN SODIUM 60 MILLIGRAM(S): 100 INJECTION SUBCUTANEOUS at 05:03

## 2024-05-12 RX ADMIN — MONTELUKAST 10 MILLIGRAM(S): 4 TABLET, CHEWABLE ORAL at 21:23

## 2024-05-12 RX ADMIN — OXYCODONE HYDROCHLORIDE 10 MILLIGRAM(S): 5 TABLET ORAL at 13:02

## 2024-05-12 RX ADMIN — Medication 25 GRAM(S): at 10:18

## 2024-05-12 RX ADMIN — Medication 6 MILLIGRAM(S): at 21:23

## 2024-05-12 RX ADMIN — ENOXAPARIN SODIUM 60 MILLIGRAM(S): 100 INJECTION SUBCUTANEOUS at 18:24

## 2024-05-12 RX ADMIN — CARVEDILOL PHOSPHATE 3.12 MILLIGRAM(S): 80 CAPSULE, EXTENDED RELEASE ORAL at 05:02

## 2024-05-12 RX ADMIN — ATORVASTATIN CALCIUM 20 MILLIGRAM(S): 80 TABLET, FILM COATED ORAL at 21:23

## 2024-05-12 RX ADMIN — METHOCARBAMOL 750 MILLIGRAM(S): 500 TABLET, FILM COATED ORAL at 05:02

## 2024-05-12 RX ADMIN — METHOCARBAMOL 750 MILLIGRAM(S): 500 TABLET, FILM COATED ORAL at 21:23

## 2024-05-12 RX ADMIN — Medication 12 UNIT(S): at 11:34

## 2024-05-12 RX ADMIN — LIDOCAINE 3 PATCH: 4 CREAM TOPICAL at 00:10

## 2024-05-12 RX ADMIN — Medication 12 UNIT(S): at 17:05

## 2024-05-12 RX ADMIN — Medication 2: at 17:05

## 2024-05-12 RX ADMIN — METHOCARBAMOL 750 MILLIGRAM(S): 500 TABLET, FILM COATED ORAL at 13:58

## 2024-05-12 RX ADMIN — Medication 100 MILLIGRAM(S): at 21:22

## 2024-05-12 RX ADMIN — PANTOPRAZOLE SODIUM 40 MILLIGRAM(S): 20 TABLET, DELAYED RELEASE ORAL at 05:02

## 2024-05-12 RX ADMIN — HYDROMORPHONE HYDROCHLORIDE 1 MILLIGRAM(S): 2 INJECTION INTRAMUSCULAR; INTRAVENOUS; SUBCUTANEOUS at 17:32

## 2024-05-12 RX ADMIN — INSULIN GLARGINE 54 UNIT(S): 100 INJECTION, SOLUTION SUBCUTANEOUS at 21:23

## 2024-05-12 RX ADMIN — HYDROMORPHONE HYDROCHLORIDE 1 MILLIGRAM(S): 2 INJECTION INTRAMUSCULAR; INTRAVENOUS; SUBCUTANEOUS at 14:44

## 2024-05-12 RX ADMIN — HYDROMORPHONE HYDROCHLORIDE 1 MILLIGRAM(S): 2 INJECTION INTRAMUSCULAR; INTRAVENOUS; SUBCUTANEOUS at 05:01

## 2024-05-12 RX ADMIN — Medication 100 MILLIGRAM(S): at 05:01

## 2024-05-12 RX ADMIN — AMPICILLIN SODIUM AND SULBACTAM SODIUM 200 GRAM(S): 250; 125 INJECTION, POWDER, FOR SUSPENSION INTRAMUSCULAR; INTRAVENOUS at 17:34

## 2024-05-12 RX ADMIN — LIDOCAINE 3 PATCH: 4 CREAM TOPICAL at 23:14

## 2024-05-12 RX ADMIN — Medication 40 MILLIEQUIVALENT(S): at 13:57

## 2024-05-12 RX ADMIN — HYDROMORPHONE HYDROCHLORIDE 1 MILLIGRAM(S): 2 INJECTION INTRAMUSCULAR; INTRAVENOUS; SUBCUTANEOUS at 20:23

## 2024-05-12 RX ADMIN — AMPICILLIN SODIUM AND SULBACTAM SODIUM 200 GRAM(S): 250; 125 INJECTION, POWDER, FOR SUSPENSION INTRAMUSCULAR; INTRAVENOUS at 05:08

## 2024-05-12 NOTE — DISCHARGE NOTE PROVIDER - NSDCCPCAREPLAN_GEN_ALL_CORE_FT
PRINCIPAL DISCHARGE DIAGNOSIS  Diagnosis: Complex regional pain syndrome type 1  Assessment and Plan of Treatment: you have a history of CRPS type 1, diagnosed 8 yrs ago. Used to follow with pain management however left due to complications with physician, has not be able to find another pain doctor since.   you presented with intractable right flank pain, radiating in the back area. Pt states this is day 3 of her flare up  -CT A/P no obstructive uropathy, but noted for right kidney stone, IUD, moderate stool, right flank spinal stimulator  continue pain medications as directed  with bowel regimen: senna and miralax  Follow up with your PCP outpatient.         SECONDARY DISCHARGE DIAGNOSES  Diagnosis: Cheek swelling  Assessment and Plan of Treatment: You had recent lipoma resection 4/15 and [facility]. Checked up on last week, had needle placed in cheek, no further drainage  mild fever at home, now with slightly increased swelling  continue  viscous lidocaine q4h as needed   continue pain medication as needed as directed.  Continue follow up with your plastic surgery doctor as scheduled outpatient on  5/16 for possible additional drainage.      Diagnosis: DM (diabetes mellitus)  Assessment and Plan of Treatment: HgA1C 7.7 this admission.  You take Tresiba 54u qhs and 12u Novolog TID and Metformin 1000mg BID at home  continue home medicaitons and monitor blood glucose before meals and at bedtime.   Follow up with your PCP and/or endocrine specialist.   Make sure you get your HgA1c checked every three months.  If you take oral diabetes medications, check your blood glucose two times a day.  If you take insulin, check your blood glucose before meals and at bedtime.  It's important not to skip any meals.  Keep a log of your blood glucose results and always take it with you to your doctor appointments.  Keep a list of your current medications including injectables and over the counter medications and bring this medication list with you to all your doctor appointments.  If you have not seen your ophthalmologist this year call for appointment.  Check your feet daily for redness, sores, or openings. Do not self treat. If no improvement in two days call your primary care physician for an appointment.  Low blood sugar (hypoglycemia) is a blood sugar below 70mg/dl. Check your blood sugar if you feel signs/symptoms of hypoglycemia. If your blood sugar is below 70 take 15 grams of carbohydrates (ex 4 oz of apple juice, 3-4 glucose tablets, or 4-6 oz of regular soda) wait 15 minutes and repeat blood sugar to make sure it comes up above 70.  If your blood sugar is above 70 and you are due for a meal, have a meal.  If you are not due for a meal have a snack.  This snack helps keeps your blood sugar at a safe range.      Diagnosis: HTN (hypertension)  Assessment and Plan of Treatment: a history of HTN, takes Bumex 2mg daily and coreg 3.125mg BID and lisinopril 5mg at home  as above.  Low salt diet  Activity as tolerated.  Take all medication as prescribed.  Follow up with your medical doctor for routine blood pressure monitoring at your next visit.  Notify your doctor if you have any of the following symptoms:   Dizziness, Lightheadedness, Blurry vision, Headache, Chest pain, Shortness of breath      Diagnosis: Chronic CHF  Assessment and Plan of Treatment: You have a history of CHF, takes Bumex 2mg daily and coreg 3.125mg BID and lisinopril 5mg daily  - hold lisinopril for now, as patient BP is controlled, intermittent elevated due to pain and pt requires IV Dilaudid  - restart lisinopril on dc   - C/w Home meds.  Weigh yourself daily.  If you gain 3lbs in 3 days, or 5lbs in a week call your Health Care Provider.  Do not eat or drink foods containing more than 2000mg of salt (sodium) in your diet every day.  Call your Health Care Provider if you have any swelling or increased swelling in your feet, ankles, and/or stomach.  Take all of your medication as directed.  If you become dizzy call your Health Care Provider.      Diagnosis: Anemia  Assessment and Plan of Treatment: Symptoms to report, bleeding, palpitations, fatigue, pale skin, cold skin, dizziness. Take medications as ordered by PCP      Diagnosis: Electrolyte imbalance  Assessment and Plan of Treatment: You were treated for low potassium and magnesium level during hospital stay. Eat a balanced diet.   Follow up with your PCP to monitor blood electrolytes in 1-2 weeks after discharge.    Diagnosis: Nephrolithiasis  Assessment and Plan of Treatment: CT abdomen/pelvis resulted - Punctate nonobstructing stone in the interpolar region of the right kidney. No hydronephrosis or perinephric inflammation.  Continue follow up with your PCP for  outpatient urology follow up.     PRINCIPAL DISCHARGE DIAGNOSIS  Diagnosis: Complex regional pain syndrome type 1  Assessment and Plan of Treatment: you have a history of CRPS type 1, diagnosed 8 yrs ago. Used to follow with pain management however left due to complications with physician, has not be able to find another pain doctor since.   you presented with intractable right flank pain, radiating in the back area. Pt states this is day 3 of her flare up  -CT A/P no obstructive uropathy, but noted for right kidney stone, IUD, moderate stool, right flank spinal stimulator  continue pain medications as directed  with bowel regimen: senna and miralax  Follow up with your PCP outpatient.   You were given phone number for pain management provider to follow up with outpatient, Dr. Hay, [phone].        SECONDARY DISCHARGE DIAGNOSES  Diagnosis: Cheek swelling  Assessment and Plan of Treatment: You had recent lipoma resection 4/15 and [facility]. Checked up on last week, had needle placed in cheek, no further drainage  mild fever at home, now with slightly increased swelling  continue  viscous lidocaine q4h as needed   continue pain medication as needed as directed.  Continue follow up with your plastic surgery doctor on 5/16 as scheduled for possible additional drainage.       Diagnosis: DM (diabetes mellitus)  Assessment and Plan of Treatment: HgA1C 7.7 this admission.  You take Tresiba 54u qhs and 12u Novolog TID and Metformin 1000mg BID at home  continue home medicaitons and monitor blood glucose before meals and at bedtime.   Follow up with your PCP and/or endocrine specialist.   Make sure you get your HgA1c checked every three months.  If you take oral diabetes medications, check your blood glucose two times a day.  If you take insulin, check your blood glucose before meals and at bedtime.  It's important not to skip any meals.  Keep a log of your blood glucose results and always take it with you to your doctor appointments.  Keep a list of your current medications including injectables and over the counter medications and bring this medication list with you to all your doctor appointments.  If you have not seen your ophthalmologist this year call for appointment.  Check your feet daily for redness, sores, or openings. Do not self treat. If no improvement in two days call your primary care physician for an appointment.  Low blood sugar (hypoglycemia) is a blood sugar below 70mg/dl. Check your blood sugar if you feel signs/symptoms of hypoglycemia. If your blood sugar is below 70 take 15 grams of carbohydrates (ex 4 oz of apple juice, 3-4 glucose tablets, or 4-6 oz of regular soda) wait 15 minutes and repeat blood sugar to make sure it comes up above 70.  If your blood sugar is above 70 and you are due for a meal, have a meal.  If you are not due for a meal have a snack.  This snack helps keeps your blood sugar at a safe range.      Diagnosis: HTN (hypertension)  Assessment and Plan of Treatment: a history of HTN, takes Bumex 2mg daily and coreg 3.125mg BID and lisinopril 5mg at home  as above.  Low salt diet  Activity as tolerated.  Take all medication as prescribed.  Follow up with your medical doctor for routine blood pressure monitoring at your next visit.  Notify your doctor if you have any of the following symptoms:   Dizziness, Lightheadedness, Blurry vision, Headache, Chest pain, Shortness of breath      Diagnosis: Chronic CHF  Assessment and Plan of Treatment: You have a history of CHF, takes Bumex 2mg daily and coreg 3.125mg BID and lisinopril 5mg daily  - hold lisinopril for now, as patient BP is controlled, intermittent elevated due to pain and pt requires IV Dilaudid  - restart lisinopril on dc   - C/w Home meds.  Weigh yourself daily.  If you gain 3lbs in 3 days, or 5lbs in a week call your Health Care Provider.  Do not eat or drink foods containing more than 2000mg of salt (sodium) in your diet every day.  Call your Health Care Provider if you have any swelling or increased swelling in your feet, ankles, and/or stomach.  Take all of your medication as directed.  If you become dizzy call your Health Care Provider.      Diagnosis: Anemia  Assessment and Plan of Treatment: Symptoms to report, bleeding, palpitations, fatigue, pale skin, cold skin, dizziness. Take medications as ordered by PCP      Diagnosis: Electrolyte imbalance  Assessment and Plan of Treatment: You were treated for low potassium and magnesium level during hospital stay. Eat a balanced diet.   Follow up with your PCP to monitor blood electrolytes in 1-2 weeks after discharge.    Diagnosis: Nephrolithiasis  Assessment and Plan of Treatment: CT abdomen/pelvis resulted - Punctate nonobstructing stone in the interpolar region of the right kidney. No hydronephrosis or perinephric inflammation.  Continue follow up with your PCP for  outpatient urology follow up.     PRINCIPAL DISCHARGE DIAGNOSIS  Diagnosis: Complex regional pain syndrome type 1  Assessment and Plan of Treatment: you have a history of CRPS type 1, diagnosed 8 yrs ago. Used to follow with pain management however left due to complications with physician, has not be able to find another pain doctor since.   you presented with intractable right flank pain, radiating in the back area. Pt states this is day 3 of her flare up  -CT A/P no obstructive uropathy, but noted for right kidney stone, IUD, moderate stool, right flank spinal stimulator  continue pain medications as directed  with bowel regimen: senna and miralax  Follow up with your PCP outpatient.   You were given phone number for pain management provider to follow up with outpatient, Dr. Hay, [phone].        SECONDARY DISCHARGE DIAGNOSES  Diagnosis: Cheek swelling  Assessment and Plan of Treatment: You had recent lipoma resection 4/15 and [facility]. Checked up on last week, had needle placed in cheek, no further drainage  mild fever at home, now with slightly increased swelling  continue  viscous lidocaine q4h as needed   continue pain medication as needed as directed.  Continue follow up with your plastic surgery doctor  as scheduled for possible additional drainage.   complete antibiotic Augmentin total 14 day course until -------.       Diagnosis: Periodontal abscess  Assessment and Plan of Treatment: See plan above cheek swelling      Diagnosis: DM (diabetes mellitus)  Assessment and Plan of Treatment: HgA1C 7.7 this admission.  You take Tresiba 54u qhs and 12u Novolog TID and Metformin 1000mg BID at home  continue home medicaitons and monitor blood glucose before meals and at bedtime.   Follow up with your PCP and/or endocrine specialist.   Make sure you get your HgA1c checked every three months.  If you take oral diabetes medications, check your blood glucose two times a day.  If you take insulin, check your blood glucose before meals and at bedtime.  It's important not to skip any meals.  Keep a log of your blood glucose results and always take it with you to your doctor appointments.  Keep a list of your current medications including injectables and over the counter medications and bring this medication list with you to all your doctor appointments.  If you have not seen your ophthalmologist this year call for appointment.  Check your feet daily for redness, sores, or openings. Do not self treat. If no improvement in two days call your primary care physician for an appointment.  Low blood sugar (hypoglycemia) is a blood sugar below 70mg/dl. Check your blood sugar if you feel signs/symptoms of hypoglycemia. If your blood sugar is below 70 take 15 grams of carbohydrates (ex 4 oz of apple juice, 3-4 glucose tablets, or 4-6 oz of regular soda) wait 15 minutes and repeat blood sugar to make sure it comes up above 70.  If your blood sugar is above 70 and you are due for a meal, have a meal.  If you are not due for a meal have a snack.  This snack helps keeps your blood sugar at a safe range.      Diagnosis: HTN (hypertension)  Assessment and Plan of Treatment: a history of HTN, takes Bumex 2mg daily and coreg 3.125mg BID and lisinopril 5mg at home  as above.  Low salt diet  Activity as tolerated.  Take all medication as prescribed.  Follow up with your medical doctor for routine blood pressure monitoring at your next visit.  Notify your doctor if you have any of the following symptoms:   Dizziness, Lightheadedness, Blurry vision, Headache, Chest pain, Shortness of breath      Diagnosis: Chronic CHF  Assessment and Plan of Treatment: You have a history of CHF, takes Bumex 2mg daily and coreg 3.125mg BID and lisinopril 5mg daily  - hold lisinopril for now, as patient BP is controlled, intermittent elevated due to pain and pt requires IV Dilaudid  - restart lisinopril on dc   - C/w Home meds.  Weigh yourself daily.  If you gain 3lbs in 3 days, or 5lbs in a week call your Health Care Provider.  Do not eat or drink foods containing more than 2000mg of salt (sodium) in your diet every day.  Call your Health Care Provider if you have any swelling or increased swelling in your feet, ankles, and/or stomach.  Take all of your medication as directed.  If you become dizzy call your Health Care Provider.      Diagnosis: Anemia  Assessment and Plan of Treatment: Symptoms to report, bleeding, palpitations, fatigue, pale skin, cold skin, dizziness. Take medications as ordered by PCP      Diagnosis: Electrolyte imbalance  Assessment and Plan of Treatment: You were treated for low potassium and magnesium level during hospital stay. Eat a balanced diet.   Follow up with your PCP to monitor blood electrolytes in 1-2 weeks after discharge.    Diagnosis: Nephrolithiasis  Assessment and Plan of Treatment: CT abdomen/pelvis resulted - Punctate nonobstructing stone in the interpolar region of the right kidney. No hydronephrosis or perinephric inflammation.  Continue follow up with your PCP for  outpatient urology follow up.     PRINCIPAL DISCHARGE DIAGNOSIS  Diagnosis: Complex regional pain syndrome type 1  Assessment and Plan of Treatment: you have a history of CRPS type 1, diagnosed 8 yrs ago. Used to follow with pain management however left due to complications with physician, has not be able to find another pain doctor since.   you presented with intractable right flank pain, radiating in the back area. Pt states this is day 3 of her flare up  -CT A/P no obstructive uropathy, but noted for right kidney stone, IUD, moderate stool, right flank spinal stimulator  continue pain medications as directed  with bowel regimen: senna and miralax  Follow up with your PCP outpatient.   You were given phone number for pain management provider to follow up with outpatient, Dr. Hay, [phone].        SECONDARY DISCHARGE DIAGNOSES  Diagnosis: Cheek swelling  Assessment and Plan of Treatment: You had recent lipoma resection 4/15 and [facility]. Checked up on last week, had needle placed in cheek, no further drainage  mild fever at home, now with slightly increased swelling  continue  viscous lidocaine q4h as needed   continue pain medication as needed as directed.  Continue follow up with your plastic surgery doctor  as scheduled for possible additional drainage.   complete antibiotic Augmentin total 14 day course until       Diagnosis: DM (diabetes mellitus)  Assessment and Plan of Treatment: HgA1C 7.7 this admission.  You take Tresiba 54u qhs and 12u Novolog TID and Metformin 1000mg BID at home  continue home medicaitons and monitor blood glucose before meals and at bedtime.   Follow up with your PCP and/or endocrine specialist.   Make sure you get your HgA1c checked every three months.  If you take oral diabetes medications, check your blood glucose two times a day.  If you take insulin, check your blood glucose before meals and at bedtime.  It's important not to skip any meals.  Keep a log of your blood glucose results and always take it with you to your doctor appointments.  Keep a list of your current medications including injectables and over the counter medications and bring this medication list with you to all your doctor appointments.  If you have not seen your ophthalmologist this year call for appointment.  Check your feet daily for redness, sores, or openings. Do not self treat. If no improvement in two days call your primary care physician for an appointment.  Low blood sugar (hypoglycemia) is a blood sugar below 70mg/dl. Check your blood sugar if you feel signs/symptoms of hypoglycemia. If your blood sugar is below 70 take 15 grams of carbohydrates (ex 4 oz of apple juice, 3-4 glucose tablets, or 4-6 oz of regular soda) wait 15 minutes and repeat blood sugar to make sure it comes up above 70.  If your blood sugar is above 70 and you are due for a meal, have a meal.  If you are not due for a meal have a snack.  This snack helps keeps your blood sugar at a safe range.      Diagnosis: HTN (hypertension)  Assessment and Plan of Treatment: a history of HTN, takes Bumex 2mg daily and coreg 3.125mg BID and lisinopril 5mg at home  as above.  Low salt diet  Activity as tolerated.  Take all medication as prescribed.  Follow up with your medical doctor for routine blood pressure monitoring at your next visit.  Notify your doctor if you have any of the following symptoms:   Dizziness, Lightheadedness, Blurry vision, Headache, Chest pain, Shortness of breath      Diagnosis: Chronic CHF  Assessment and Plan of Treatment: You have a history of CHF, takes Bumex 2mg daily and coreg 3.125mg BID and lisinopril 5mg daily  - hold lisinopril for now, as patient BP is controlled, intermittent elevated due to pain and pt requires IV Dilaudid  - restart lisinopril on dc   - C/w Home meds.  Weigh yourself daily.  If you gain 3lbs in 3 days, or 5lbs in a week call your Health Care Provider.  Do not eat or drink foods containing more than 2000mg of salt (sodium) in your diet every day.  Call your Health Care Provider if you have any swelling or increased swelling in your feet, ankles, and/or stomach.  Take all of your medication as directed.  If you become dizzy call your Health Care Provider.      Diagnosis: Anemia  Assessment and Plan of Treatment: Symptoms to report, bleeding, palpitations, fatigue, pale skin, cold skin, dizziness. Take medications as ordered by PCP      Diagnosis: Electrolyte imbalance  Assessment and Plan of Treatment: You were treated for low potassium and magnesium level during hospital stay. Eat a balanced diet.   Follow up with your PCP to monitor blood electrolytes in 1-2 weeks after discharge.    Diagnosis: Nephrolithiasis  Assessment and Plan of Treatment: CT abdomen/pelvis resulted - Punctate nonobstructing stone in the interpolar region of the right kidney. No hydronephrosis or perinephric inflammation.  Continue follow up with your PCP for  outpatient urology follow up.    Diagnosis: Periodontal abscess  Assessment and Plan of Treatment: See plan above cheek swelling

## 2024-05-12 NOTE — PROGRESS NOTE ADULT - PROBLEM SELECTOR PLAN 7
Pt has a history of HTN, takes Bumex 2mg daily, Coreg 3.125mg BID and lisinopril 5mg at home  -Hold lisinopril 5mg daily   -Continue Coreg 3.125mg po BID and Bumex 2mg daily

## 2024-05-12 NOTE — PROGRESS NOTE ADULT - PROBLEM SELECTOR PLAN 5
Patient with ongoing AM hypokalemia, likely in the setting of the Bumex she takes for her CHF  -Replete with 40mEq po x 2 KCL  -Replete magnesium with 2g IV x1

## 2024-05-12 NOTE — PROGRESS NOTE ADULT - SUBJECTIVE AND OBJECTIVE BOX
[facility] Medicine    Patient is a 54y old  Female who presents with a chief complaint of Complex Regional Pain syndrome flare (12 May 2024 09:29)      SUBJECTIVE / OVERNIGHT EVENTS: Patient required 3 doses of IV dilaudid overnight. She reports that she did not sleep well as she is stressed about the swelling and bruising of her face. She is aware of the need to follow-up with her plastic surgeon upon discharge from the hospital. She reports that the pain in her right back and flank are slightly better with the IV dilaudid, currently 6/10. She was able to sit in the chair for sometime. She does endorse BM's after taking a dose of lactulose yesterday.    MEDICATIONS  (STANDING):  ampicillin/sulbactam  IVPB      ampicillin/sulbactam  IVPB 3 Gram(s) IV Intermittent every 6 hours  atorvastatin 20 milliGRAM(s) Oral at bedtime  buMETAnide 2 milliGRAM(s) Oral daily  carvedilol 3.125 milliGRAM(s) Oral every 12 hours  enoxaparin Injectable 60 milliGRAM(s) SubCutaneous every 12 hours  insulin glargine Injectable (LANTUS) 54 Unit(s) SubCutaneous at bedtime  insulin lispro (ADMELOG) corrective regimen sliding scale   SubCutaneous three times a day before meals  insulin lispro (ADMELOG) corrective regimen sliding scale   SubCutaneous at bedtime  insulin lispro Injectable (ADMELOG) 12 Unit(s) SubCutaneous three times a day before meals  lidocaine   4% Patch 3 Patch Transdermal daily  melatonin 6 milliGRAM(s) Oral at bedtime  methocarbamol 750 milliGRAM(s) Oral every 8 hours  montelukast 10 milliGRAM(s) Oral at bedtime  pantoprazole    Tablet 40 milliGRAM(s) Oral before breakfast  pregabalin 100 milliGRAM(s) Oral three times a day  senna 2 Tablet(s) Oral at bedtime    MEDICATIONS  (PRN):  acetaminophen     Tablet .. 650 milliGRAM(s) Oral every 6 hours PRN Temp greater or equal to 38C (100.4F), Mild Pain (1 - 3)  aluminum hydroxide/magnesium hydroxide/simethicone Suspension 30 milliLiter(s) Oral every 4 hours PRN Dyspepsia  HYDROmorphone  Injectable 1 milliGRAM(s) IV Push every 4 hours PRN Pain  lactulose Syrup 20 Gram(s) Oral three times a day PRN Constipation  ondansetron Injectable 4 milliGRAM(s) IV Push every 8 hours PRN Nausea and/or Vomiting  oxyCODONE    IR 10 milliGRAM(s) Oral every 6 hours PRN Moderate Pain (4 - 6)      Vital Signs Last 24 Hrs  T(C): 36.7 (05-12-24 @ 14:00)  T(F): 98.1 (05-12-24 @ 14:00), Max: 98.1 (05-11-24 @ 14:30)  HR: 64 (05-12-24 @ 14:00) (58 - 83)  BP: 146/83 (05-12-24 @ 14:00)  BP(mean): 67 (05-11-24 @ 20:00) (67 - 67)  RR: 18 (05-12-24 @ 14:00) (18 - 20)  SpO2: 95% (05-12-24 @ 14:00) (95% - 99%)  Wt(kg): --    CAPILLARY BLOOD GLUCOSE      POCT Blood Glucose.: 249 mg/dL (12 May 2024 11:29)  POCT Blood Glucose.: 226 mg/dL (12 May 2024 08:13)  POCT Blood Glucose.: 246 mg/dL (11 May 2024 21:09)  POCT Blood Glucose.: 202 mg/dL (11 May 2024 16:39)    I&O's Summary      PHYSICAL EXAM:  GENERAL: In mild discomfort 2/2 pain, well-developed  HEAD:  Atraumatic, Normocephalic, swelling of R cheek  EYES:  conjunctiva and sclera clear  NECK: Supple, No JVD, enlarged circumference  CHEST/LUNG: Clear to auscultation bilaterally; No wheeze, rhonchi, rales  HEART: Regular rate and rhythm; No murmurs, rubs, or gallops  ABDOMEN: Soft, Nontender, Nondistended; Bowel sounds present  EXTREMITIES:  2+ Peripheral Pulses, No clubbing, cyanosis, or edema  PSYCH: AAOx3, cooperative  NEUROLOGY: non-focal  SKIN: No rashes, +ecchymoses of the R cheek    LABS:                        10.6   10.22 )-----------( 335      ( 12 May 2024 05:50 )             32.3     05-12    141  |  107  |  9   ----------------------------<  245<H>  3.2<L>   |  26  |  0.80    Ca    7.8<L>      12 May 2024 05:50  Mg     1.5     05-12        Urinalysis Basic - ( 12 May 2024 05:50 )    Color: x / Appearance: x / SG: x / pH: x  Gluc: 245 mg/dL / Ketone: x  / Bili: x / Urobili: x   Blood: x / Protein: x / Nitrite: x   Leuk Esterase: x / RBC: x / WBC x   Sq Epi: x / Non Sq Epi: x / Bacteria: x        RADIOLOGY & ADDITIONAL TESTS:    Imaging Personally Reviewed:    Consultant(s) Notes Reviewed:      Care Discussed with Consultants/Other Providers: STEPHANIE Leonardo re: plan of care    Assessment and Plan:

## 2024-05-12 NOTE — PROGRESS NOTE ADULT - PROBLEM SELECTOR PLAN 1
Pt has a history of CRPS type 1, diagnosed 8 yrs ago. Used to follow with pain management however left due to complications with physician, has not be able to find another pain doctor since  -Presents with right flank pain, radiating in the back area.  -CT A/P no obstructive uropathy, but noted for right kidney stone, IUD, moderate stool, right flank spinal stimulator  -bowel regimen: senna and lactulose, patient reports she does not tolerate Miralax  -Pain management following, will follow-up additional recommendations  -Continue pain control with IV dilaudid PRN and po oxycodone PRN

## 2024-05-12 NOTE — DISCHARGE NOTE PROVIDER - CARE PROVIDER_API CALL
PCP Vinny Forbes  Phone: [phone]  Fax: (   )    -  Follow Up Time: 1 week   PCP Vinny Forbes  Phone: [phone]  Fax: (   )    -  Follow Up Time: 1 week    Rudi Hay  Pain Medicine  [address] [place] [id number]  Phone: [phone]  Fax: [phone]  Follow Up Time:

## 2024-05-12 NOTE — PROGRESS NOTE ADULT - PROBLEM SELECTOR PLAN 4
Pt has a history of DM, takes Tresiba 54u qhs and 12u Novolog TID and Metformin 1000mg BID at home. A1c is 7.7  - Glucose elevated to the 200's this morning  - C/w mod ISS  - Increase Lantus to home 54u qhs  - Continue Admelog 12 units tid before meals  - FS ACHS

## 2024-05-12 NOTE — DISCHARGE NOTE PROVIDER - NSDCFUSCHEDAPPT_GEN_ALL_CORE_FT
Bebeto Hay  [facility]  [facility]AST[facility] [address]  Scheduled Appointment: 05/16/2024

## 2024-05-12 NOTE — PROGRESS NOTE ADULT - ASSESSMENT
53 yo F with DM2, HTN, HLD, chronic HFpEF, complex regional pain syndrome of the R back intercostal muscles s/p spinal cord stimulator (3/23) who resents with right flank pain 2/2 flare of her complex regional pain syndrome. On admission also noted to have significant pain and swelling of the right face s/p lipoma resection at [facility]. CT maxillofacial is concerning for possible abscess.

## 2024-05-12 NOTE — DISCHARGE NOTE PROVIDER - ATTENDING DISCHARGE PHYSICAL EXAMINATION:
PHYSICAL EXAM:  GENERAL: NAD  HEAD:  Atraumatic, Normocephalic  EYES: conjunctiva and sclera clear  NECK: Supple, No JVD  CHEST/LUNG: Clear to auscultation bilaterally; No wheeze  HEART: Regular rate and rhythm; No murmurs, rubs, or gallops  ABDOMEN: Soft, Nondistended; Bowel sounds present. R-sided chronic tenderness noted  EXTREMITIES:  No clubbing, cyanosis, or edema  PSYCH: AAOx3  NEUROLOGY: non-focal  SKIN: No rashes or lesions

## 2024-05-12 NOTE — PROGRESS NOTE ADULT - PROBLEM SELECTOR PLAN 2
-s/p lipoma resection 4/15 at [facility]. Checked up on last week, had needle placed in cheek, no further drainage  mild fever at home, now with increased swelling and ecchymoses. CT maxillofacial is concerning for possible abscess vs air  -c/w Unasyn per ID recommendations with plans to transition to Augmentin 875/125mg po Q12hrs for a total of a 14 day course of antibiotics  -Per discussion with IR, there is no fluid to drain at the site of the abscess and it is only air  -Case discussed with Dr. Ba and STEPHANIE Hay of plastics surgery, they want patient to follow up with them as an outpatient on Tuesday, 5/14/24 if discharged from the hospital. They do not feel transfer is warranted  -Send blood cultures if patient is febrile  -Trend ESR and CRP  -ID Dr. Short following   -Anticipate dc home to complete 14 day course of antibiotics with Augmentin

## 2024-05-12 NOTE — DISCHARGE NOTE PROVIDER - NSDCMRMEDTOKEN_GEN_ALL_CORE_FT
amoxicillin-clavulanate 875 mg-125 mg oral tablet: 875 milligram(s) orally 2 times a day take 10 more days until 5/22  atorvastatin 20 mg oral tablet: 1 tab(s) orally once a day (at bedtime)  bumetanide 2 mg oral tablet: 1 tab(s) orally once a day  carvedilol 3.125 mg oral tablet: 1 tab(s) orally every 12 hours  Lidocare Pain Relief Patch 4% topical film: Apply topically to affected area once a day apply to lower back, right flank and mid back  lisinopril 5 mg oral tablet: 1 tab(s) orally once a day  metFORMIN 1000 mg oral tablet: 1 tab(s) orally 2 times a day  montelukast 10 mg oral tablet: 1 tab(s) orally once a day  Movantik 25 mg oral tablet: 1 tab(s) orally once a day  NovoLOG FlexPen 100 units/mL injectable solution: 12 unit(s) injectable 3 times a day (before meals)  pantoprazole 40 mg oral delayed release tablet: 1 tab(s) orally once a day (before a meal)  pregabalin 100 mg oral capsule: 1 cap(s) orally 3 times a day  Tresiba 100 units/mL subcutaneous solution: 54 international unit(s) subcutaneous once a day (at bedtime)  Zanaflex 4 mg oral tablet: 1 tab(s) orally 3 times a day as needed for  muscle spasm   acetaminophen 500 mg oral tablet: 2 tab(s) orally every 8 hours as needed for Moderate Pain (4 - 6)  amoxicillin-clavulanate 875 mg-125 mg oral tablet: 875 milligram(s) orally 2 times a day take 10 more days until 5/22  atorvastatin 20 mg oral tablet: 1 tab(s) orally once a day (at bedtime)  bumetanide 2 mg oral tablet: 1 tab(s) orally once a day  carvedilol 3.125 mg oral tablet: 1 tab(s) orally every 12 hours  Lidocare Pain Relief Patch 4% topical film: Apply topically to affected area once a day apply to lower back, right flank and mid back  lisinopril 5 mg oral tablet: 1 tab(s) orally once a day  metFORMIN 1000 mg oral tablet: 1 tab(s) orally 2 times a day  montelukast 10 mg oral tablet: 1 tab(s) orally once a day  Movantik 25 mg oral tablet: 1 tab(s) orally once a day  NovoLOG FlexPen 100 units/mL injectable solution: 12 unit(s) injectable 3 times a day (before meals)  pantoprazole 40 mg oral delayed release tablet: 1 tab(s) orally once a day (before a meal)  pregabalin 100 mg oral capsule: 1 cap(s) orally 3 times a day  Tresiba 100 units/mL subcutaneous solution: 54 international unit(s) subcutaneous once a day (at bedtime)  Zanaflex 4 mg oral tablet: 1 tab(s) orally 3 times a day as needed for  muscle spasm   acetaminophen 500 mg oral tablet: 2 tab(s) orally every 8 hours as needed for Moderate Pain (4 - 6)  albuterol 90 mcg/inh inhalation aerosol: 2 puff(s) inhaled every 6 hours as needed for Bronchospasm  amoxicillin-clavulanate 875 mg-125 mg oral tablet: 875 milligram(s) orally 2 times a day take 10 more days until 5/22  atorvastatin 20 mg oral tablet: 1 tab(s) orally once a day (at bedtime)  bumetanide 2 mg oral tablet: 1 tab(s) orally once a day  carvedilol 3.125 mg oral tablet: 1 tab(s) orally every 12 hours  Lidocare Pain Relief Patch 4% topical film: Apply topically to affected area once a day apply to lower back, right flank and mid back  lisinopril 5 mg oral tablet: 1 tab(s) orally once a day  metFORMIN 1000 mg oral tablet: 1 tab(s) orally 2 times a day  methocarbamol 750 mg oral tablet: 1 tab(s) orally every 8 hours  montelukast 10 mg oral tablet: 1 tab(s) orally once a day  Movantik 25 mg oral tablet: 1 tab(s) orally once a day  NovoLOG FlexPen 100 units/mL injectable solution: 12 unit(s) injectable 3 times a day (before meals)  pantoprazole 40 mg oral delayed release tablet: 1 tab(s) orally once a day (before a meal)  pregabalin 100 mg oral capsule: 1 cap(s) orally 3 times a day  Tresiba 100 units/mL subcutaneous solution: 54 international unit(s) subcutaneous once a day (at bedtime)  Zanaflex 4 mg oral tablet: 1 tab(s) orally 3 times a day as needed for  muscle spasm

## 2024-05-12 NOTE — PROGRESS NOTE ADULT - PROBLEM SELECTOR PLAN 6
Pt has a history of CHF, takes Bumex 2mg daily and coreg 3.125mg BID and lisinopril 5mg daily  - Hold lisinopril for now, as patient BP is controlled  - restart lisinopril on dc   - C/w Coreg 3.125mg po BID  -C/w Bumex 2mg daily

## 2024-05-12 NOTE — DISCHARGE NOTE PROVIDER - HOSPITAL COURSE
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 Admitted for intractable pain management and right facial swelling. Pt is s/p lipoma resection 4/15 [facility]. pt had drainage and had needle placed in cheek by outpatient plastic surgeon, no further drainage was noted by plastic surgeon.     CT abdomen/Pelvis showed no obstructive uropathy, but noted for right kidney stone, IUD, moderate stool, right flank spinal stimulator. CT maxillofacial showed  soft tissue swelling with air containing collection overlying the right malar body and the premaxillary space consistent with an air-containing abscess which is new since 7/20/2023.  Pt remains on IV unasyn, ID recs to switch to Augmentin total 14 days course. Pain management following with improvement of right flank pain.  Patient is medically optimized for discharge home with outpatient follow up.   Discharge plan discussed with attending.   Please refer to medical records for in depth hospital course.          55 y/o F, from home, lives alone, ambulates with cane/walker/wheelchair, with PMHx of DM, HTN, HLD, CHF, complex regional pain syndrome of right back intercostal muscles s/p spinal cord stimulator (Mar 2023) who presents with right flank pain and right facial swelling. Pt is s/p lipoma resection 4/15 [facility]. pt had drainage and had needle placed in cheek by outpatient plastic surgeon, no further drainage was noted by plastic surgeon.   Patient admitted to medicine for complex regional pain syndrome Type 1 and right facial swelling due to periodontal abscess. Pain management and ID consulted.   CT abdomen/Pelvis showed no obstructive uropathy, but noted for right kidney stone, IUD, moderate stool, right flank spinal stimulator. CT maxillofacial showed  soft tissue swelling with air containing collection overlying the right malar body and the premaxillary space consistent with an air-containing abscess which is new since 7/20/2023.  Pt was treated IV unasyn, ID recs to switch to Augmentin total 14 days course. Pain management following. R flank pain poorly controlled, requiring IV hydromorphone as needed, Titrated to po medications.      Pain management recommending outpatient follow up. Patient also can follow up with Dr. Hay- [phone] (pain), upon discharge from hospital.   Patient developed isolated fever on 5/13, no blood culture needed as fever isolated.     5/16, pt complains uncontrolled R flank pain overnight, unrelieved with medication and vomited one time. Adjusted pain medication per pain manage specialist.     incomplete 5/16     Patient is medically optimized for discharge home with outpatient follow up.   Discharge plan discussed with attending.   Please refer to medical records for in depth hospital course.          55 y/o F, from home, lives alone, ambulates with cane/walker/wheelchair, with PMHx of DM, HTN, HLD, CHF, complex regional pain syndrome of right back intercostal muscles s/p spinal cord stimulator (Mar 2023) who presents with right flank pain and right facial swelling. Pt is s/p lipoma resection 4/15 [facility]. pt had drainage and had needle placed in cheek by outpatient plastic surgeon, no further drainage was noted by plastic surgeon.   Patient admitted to medicine for complex regional pain syndrome Type 1 and right facial swelling due to periodontal abscess. Pain management and ID consulted.   CT abdomen/Pelvis showed no obstructive uropathy, but noted for right kidney stone, IUD, moderate stool, right flank spinal stimulator. CT maxillofacial showed  soft tissue swelling with air containing collection overlying the right malar body and the premaxillary space consistent with an air-containing abscess which is new since 7/20/2023.  Pt was treated IV unasyn, ID recs to switch to Augmentin total 14 days course. Pain management following. R flank pain poorly controlled, requiring IV hydromorphone as needed, Titrated to po medications.      Pain management recommending outpatient follow up. Patient also can follow up with Dr. Hay- [phone] (pain), upon discharge from hospital.   Patient developed isolated fever on 5/13, no blood culture needed as fever isolated.     5/16, pt complains uncontrolled R flank pain overnight, unrelieved with medication and vomited one time. Adjusted pain medication per pain manage specialist.       Patient is medically optimized for discharge home with outpatient follow up.   Discharge plan discussed with attending.   Please refer to medical records for in depth hospital course.          53 y/o F, from home, lives alone, ambulates with cane/walker/wheelchair, with PMHx of DM, HTN, HLD, CHF, complex regional pain syndrome of right back intercostal muscles s/p spinal cord stimulator (Mar 2023) who presents with right flank pain and right facial swelling. Pt is s/p lipoma resection 4/15 [facility]. pt had drainage and had needle placed in cheek by outpatient plastic surgeon, no further drainage was noted by plastic surgeon.   Patient admitted to medicine for complex regional pain syndrome Type 1 and right facial swelling due to periodontal abscess. Pain management and ID consulted.   CT abdomen/Pelvis showed no obstructive uropathy, but noted for right kidney stone, IUD, moderate stool, right flank spinal stimulator. CT maxillofacial showed  soft tissue swelling with air containing collection overlying the right malar body and the premaxillary space consistent with an air-containing abscess which is new since 7/20/2023.  Pt was treated IV unasyn, ID recs to switch to Augmentin total 14 days course. Pain management following. R flank pain poorly controlled, requiring IV hydromorphone as needed, Titrated to po medications.      Pain management recommending outpatient follow up. Patient also can follow up with Dr. Hay- [phone] (pain), upon discharge from hospital.   Patient developed isolated fever on 5/13, no blood culture needed as fever isolated. 5/16, pt complains uncontrolled R flank pain overnight, unrelieved with medication and vomited one time. Adjusted pain medication per pain manage specialist. Pt denies any pain and reports that its managed.   Patient is medically optimized for discharge home with outpatient follow up.   Discharge plan discussed with attending.   Please refer to medical records for in depth hospital course.          55 y/o F, from home, lives alone, ambulates with cane/walker/wheelchair, with PMHx of DM, HTN, HLD, CHF, complex regional pain syndrome of right back intercostal muscles s/p spinal cord stimulator (Mar 2023) who presented with right flank pain for 2-3 days after she ran out of her home oxycodone prescribed from prior hospitalization. Since then, she had been taking a significant amount of ibuprofen (800mg 6x/day) without any effect. She also had been unable to follow-up with a pain specialist. She was admitted for pain control. She was started on dilaudid IV with improvement. Pain medicine was consulted, recommended discontinuing IV dilaudid, the pain was confirmed to be bearable without the medication. Pain medicine provided provider’s office for further follow up after discharge.     Of note, the patient had a lipoma resected from her right cheek on 4/15/24 by Dr. Frank Ba after which she completed a course of antibiotics. She then developed drainage and went to see her plastic surgeon on 5/2/24 where they placed a needle to help drain out the fluid. Since then, she feels the pain and swelling to her cheek area have worsened and she had a fever to 100.6 prior to admission. Maxillofacial CT for the cheek lesion showed soft tissue swelling with air containing collection overlying the right malar body and the premaxillary space consistent with an  air-containing abscess. Medicine team discussed with IR, and they recommended no intervention as the "abscess" seen on the CT maxillofacial was all air and there was no fluid to drain. The team of the plastic surgeon Dr. Ba notified of the case, and they recommend for the patient to come to the office for possible drainage. ID consulted, recommended Unasyn with transition to Augmentin 875/125 mg q12 hrs PO for total 14 days after discharge.     Pain management recommending outpatient follow up. Patient also can follow up with Dr. Hay- [phone] (pain), upon discharge from hospital.   Patient is medically optimized for discharge home with outpatient follow up.

## 2024-05-12 NOTE — DISCHARGE NOTE PROVIDER - PROVIDER TOKENS
FREE:[LAST:[PCP dr. Maldonado],FIRST:[Vinny],PHONE:[phone]],FAX:[(   )    -],FOLLOWUP:[1 week]] FREE:[LAST:[PCP dr. Maldonado],FIRST:[Vinny],PHONE:[phone]],FAX:[(   )    -],FOLLOWUP:[1 week]],PROVIDER:[TOKEN:[[id number]]]

## 2024-05-13 LAB
ANION GAP SERPL CALC-SCNC: 7 MMOL/L — SIGNIFICANT CHANGE UP (ref 5–17)
BASOPHILS # BLD AUTO: 0.03 K/UL — SIGNIFICANT CHANGE UP (ref 0–0.2)
BASOPHILS NFR BLD AUTO: 0.2 % — SIGNIFICANT CHANGE UP (ref 0–2)
BUN SERPL-MCNC: 8 MG/DL — SIGNIFICANT CHANGE UP (ref 7–18)
CALCIUM SERPL-MCNC: 8.1 MG/DL — LOW (ref 8.4–10.5)
CHLORIDE SERPL-SCNC: 106 MMOL/L — SIGNIFICANT CHANGE UP (ref 96–108)
CO2 SERPL-SCNC: 27 MMOL/L — SIGNIFICANT CHANGE UP (ref 22–31)
CREAT SERPL-MCNC: 0.84 MG/DL — SIGNIFICANT CHANGE UP (ref 0.5–1.3)
EGFR: 83 ML/MIN/1.73M2 — SIGNIFICANT CHANGE UP
EOSINOPHIL # BLD AUTO: 0.29 K/UL — SIGNIFICANT CHANGE UP (ref 0–0.5)
EOSINOPHIL NFR BLD AUTO: 2.1 % — SIGNIFICANT CHANGE UP (ref 0–6)
GLUCOSE BLDC GLUCOMTR-MCNC: 151 MG/DL — HIGH (ref 70–99)
GLUCOSE BLDC GLUCOMTR-MCNC: 160 MG/DL — HIGH (ref 70–99)
GLUCOSE BLDC GLUCOMTR-MCNC: 161 MG/DL — HIGH (ref 70–99)
GLUCOSE BLDC GLUCOMTR-MCNC: 173 MG/DL — HIGH (ref 70–99)
GLUCOSE SERPL-MCNC: 148 MG/DL — HIGH (ref 70–99)
HCT VFR BLD CALC: 34 % — LOW (ref 34.5–45)
HGB BLD-MCNC: 11 G/DL — LOW (ref 11.5–15.5)
IMM GRANULOCYTES NFR BLD AUTO: 0.3 % — SIGNIFICANT CHANGE UP (ref 0–0.9)
LYMPHOCYTES # BLD AUTO: 2.79 K/UL — SIGNIFICANT CHANGE UP (ref 1–3.3)
LYMPHOCYTES # BLD AUTO: 20.6 % — SIGNIFICANT CHANGE UP (ref 13–44)
MAGNESIUM SERPL-MCNC: 1.6 MG/DL — SIGNIFICANT CHANGE UP (ref 1.6–2.6)
MCHC RBC-ENTMCNC: 29.1 PG — SIGNIFICANT CHANGE UP (ref 27–34)
MCHC RBC-ENTMCNC: 32.4 GM/DL — SIGNIFICANT CHANGE UP (ref 32–36)
MCV RBC AUTO: 89.9 FL — SIGNIFICANT CHANGE UP (ref 80–100)
MONOCYTES # BLD AUTO: 0.79 K/UL — SIGNIFICANT CHANGE UP (ref 0–0.9)
MONOCYTES NFR BLD AUTO: 5.8 % — SIGNIFICANT CHANGE UP (ref 2–14)
MRSA PCR RESULT.: SIGNIFICANT CHANGE UP
NEUTROPHILS # BLD AUTO: 9.61 K/UL — HIGH (ref 1.8–7.4)
NEUTROPHILS NFR BLD AUTO: 71 % — SIGNIFICANT CHANGE UP (ref 43–77)
NRBC # BLD: 0 /100 WBCS — SIGNIFICANT CHANGE UP (ref 0–0)
PLATELET # BLD AUTO: 340 K/UL — SIGNIFICANT CHANGE UP (ref 150–400)
POTASSIUM SERPL-MCNC: 3.6 MMOL/L — SIGNIFICANT CHANGE UP (ref 3.5–5.3)
POTASSIUM SERPL-SCNC: 3.6 MMOL/L — SIGNIFICANT CHANGE UP (ref 3.5–5.3)
RBC # BLD: 3.78 M/UL — LOW (ref 3.8–5.2)
RBC # FLD: 13.5 % — SIGNIFICANT CHANGE UP (ref 10.3–14.5)
S AUREUS DNA NOSE QL NAA+PROBE: SIGNIFICANT CHANGE UP
SODIUM SERPL-SCNC: 140 MMOL/L — SIGNIFICANT CHANGE UP (ref 135–145)
WBC # BLD: 13.55 K/UL — HIGH (ref 3.8–10.5)
WBC # FLD AUTO: 13.55 K/UL — HIGH (ref 3.8–10.5)

## 2024-05-13 PROCEDURE — 99232 SBSQ HOSP IP/OBS MODERATE 35: CPT

## 2024-05-13 PROCEDURE — 99233 SBSQ HOSP IP/OBS HIGH 50: CPT

## 2024-05-13 RX ORDER — OXYCODONE HYDROCHLORIDE 5 MG/1
10 TABLET ORAL ONCE
Refills: 0 | Status: DISCONTINUED | OUTPATIENT
Start: 2024-05-13 | End: 2024-05-13

## 2024-05-13 RX ORDER — OXYCODONE HYDROCHLORIDE 5 MG/1
20 TABLET ORAL EVERY 8 HOURS
Refills: 0 | Status: DISCONTINUED | OUTPATIENT
Start: 2024-05-13 | End: 2024-05-13

## 2024-05-13 RX ORDER — OXYCODONE HYDROCHLORIDE 5 MG/1
10 TABLET ORAL EVERY 4 HOURS
Refills: 0 | Status: DISCONTINUED | OUTPATIENT
Start: 2024-05-13 | End: 2024-05-13

## 2024-05-13 RX ORDER — ACETAMINOPHEN 500 MG
1000 TABLET ORAL EVERY 8 HOURS
Refills: 0 | Status: DISCONTINUED | OUTPATIENT
Start: 2024-05-13 | End: 2024-05-18

## 2024-05-13 RX ORDER — POTASSIUM CHLORIDE 20 MEQ
20 PACKET (EA) ORAL
Refills: 0 | Status: COMPLETED | OUTPATIENT
Start: 2024-05-13 | End: 2024-05-13

## 2024-05-13 RX ORDER — HYDROMORPHONE HYDROCHLORIDE 2 MG/ML
1 INJECTION INTRAMUSCULAR; INTRAVENOUS; SUBCUTANEOUS EVERY 4 HOURS
Refills: 0 | Status: DISCONTINUED | OUTPATIENT
Start: 2024-05-13 | End: 2024-05-14

## 2024-05-13 RX ADMIN — METHOCARBAMOL 750 MILLIGRAM(S): 500 TABLET, FILM COATED ORAL at 05:20

## 2024-05-13 RX ADMIN — HYDROMORPHONE HYDROCHLORIDE 1 MILLIGRAM(S): 2 INJECTION INTRAMUSCULAR; INTRAVENOUS; SUBCUTANEOUS at 20:28

## 2024-05-13 RX ADMIN — Medication 2: at 16:42

## 2024-05-13 RX ADMIN — Medication 650 MILLIGRAM(S): at 09:11

## 2024-05-13 RX ADMIN — INSULIN GLARGINE 54 UNIT(S): 100 INJECTION, SOLUTION SUBCUTANEOUS at 22:24

## 2024-05-13 RX ADMIN — METHOCARBAMOL 750 MILLIGRAM(S): 500 TABLET, FILM COATED ORAL at 22:23

## 2024-05-13 RX ADMIN — Medication 20 MILLIEQUIVALENT(S): at 12:03

## 2024-05-13 RX ADMIN — OXYCODONE HYDROCHLORIDE 10 MILLIGRAM(S): 5 TABLET ORAL at 18:05

## 2024-05-13 RX ADMIN — HYDROMORPHONE HYDROCHLORIDE 1 MILLIGRAM(S): 2 INJECTION INTRAMUSCULAR; INTRAVENOUS; SUBCUTANEOUS at 06:00

## 2024-05-13 RX ADMIN — ATORVASTATIN CALCIUM 20 MILLIGRAM(S): 80 TABLET, FILM COATED ORAL at 22:23

## 2024-05-13 RX ADMIN — MONTELUKAST 10 MILLIGRAM(S): 4 TABLET, CHEWABLE ORAL at 22:23

## 2024-05-13 RX ADMIN — OXYCODONE HYDROCHLORIDE 20 MILLIGRAM(S): 5 TABLET ORAL at 13:10

## 2024-05-13 RX ADMIN — Medication 100 MILLIGRAM(S): at 13:11

## 2024-05-13 RX ADMIN — PANTOPRAZOLE SODIUM 40 MILLIGRAM(S): 20 TABLET, DELAYED RELEASE ORAL at 05:27

## 2024-05-13 RX ADMIN — Medication 1000 MILLIGRAM(S): at 15:19

## 2024-05-13 RX ADMIN — Medication 2: at 07:51

## 2024-05-13 RX ADMIN — Medication 12 UNIT(S): at 07:51

## 2024-05-13 RX ADMIN — Medication 20 MILLIEQUIVALENT(S): at 09:11

## 2024-05-13 RX ADMIN — LIDOCAINE 3 PATCH: 4 CREAM TOPICAL at 23:17

## 2024-05-13 RX ADMIN — CARVEDILOL PHOSPHATE 3.12 MILLIGRAM(S): 80 CAPSULE, EXTENDED RELEASE ORAL at 17:24

## 2024-05-13 RX ADMIN — AMPICILLIN SODIUM AND SULBACTAM SODIUM 200 GRAM(S): 250; 125 INJECTION, POWDER, FOR SUSPENSION INTRAMUSCULAR; INTRAVENOUS at 17:27

## 2024-05-13 RX ADMIN — Medication 12 UNIT(S): at 11:49

## 2024-05-13 RX ADMIN — Medication 650 MILLIGRAM(S): at 07:53

## 2024-05-13 RX ADMIN — ENOXAPARIN SODIUM 60 MILLIGRAM(S): 100 INJECTION SUBCUTANEOUS at 17:24

## 2024-05-13 RX ADMIN — LACTULOSE 20 GRAM(S): 10 SOLUTION ORAL at 12:35

## 2024-05-13 RX ADMIN — OXYCODONE HYDROCHLORIDE 20 MILLIGRAM(S): 5 TABLET ORAL at 14:09

## 2024-05-13 RX ADMIN — LIDOCAINE 3 PATCH: 4 CREAM TOPICAL at 20:46

## 2024-05-13 RX ADMIN — AMPICILLIN SODIUM AND SULBACTAM SODIUM 200 GRAM(S): 250; 125 INJECTION, POWDER, FOR SUSPENSION INTRAMUSCULAR; INTRAVENOUS at 05:21

## 2024-05-13 RX ADMIN — CARVEDILOL PHOSPHATE 3.12 MILLIGRAM(S): 80 CAPSULE, EXTENDED RELEASE ORAL at 05:21

## 2024-05-13 RX ADMIN — LIDOCAINE 3 PATCH: 4 CREAM TOPICAL at 11:50

## 2024-05-13 RX ADMIN — Medication 100 MILLIGRAM(S): at 05:21

## 2024-05-13 RX ADMIN — Medication 650 MILLIGRAM(S): at 14:40

## 2024-05-13 RX ADMIN — SENNA PLUS 2 TABLET(S): 8.6 TABLET ORAL at 22:23

## 2024-05-13 RX ADMIN — HYDROMORPHONE HYDROCHLORIDE 1 MILLIGRAM(S): 2 INJECTION INTRAMUSCULAR; INTRAVENOUS; SUBCUTANEOUS at 05:20

## 2024-05-13 RX ADMIN — ENOXAPARIN SODIUM 60 MILLIGRAM(S): 100 INJECTION SUBCUTANEOUS at 05:21

## 2024-05-13 RX ADMIN — BUMETANIDE 2 MILLIGRAM(S): 0.25 INJECTION INTRAMUSCULAR; INTRAVENOUS at 05:20

## 2024-05-13 RX ADMIN — AMPICILLIN SODIUM AND SULBACTAM SODIUM 200 GRAM(S): 250; 125 INJECTION, POWDER, FOR SUSPENSION INTRAMUSCULAR; INTRAVENOUS at 11:59

## 2024-05-13 RX ADMIN — OXYCODONE HYDROCHLORIDE 10 MILLIGRAM(S): 5 TABLET ORAL at 18:58

## 2024-05-13 RX ADMIN — Medication 12 UNIT(S): at 16:42

## 2024-05-13 RX ADMIN — Medication 2: at 11:48

## 2024-05-13 RX ADMIN — METHOCARBAMOL 750 MILLIGRAM(S): 500 TABLET, FILM COATED ORAL at 13:11

## 2024-05-13 RX ADMIN — Medication 100 MILLIGRAM(S): at 22:23

## 2024-05-13 RX ADMIN — Medication 1000 MILLIGRAM(S): at 16:37

## 2024-05-13 RX ADMIN — HYDROMORPHONE HYDROCHLORIDE 1 MILLIGRAM(S): 2 INJECTION INTRAMUSCULAR; INTRAVENOUS; SUBCUTANEOUS at 09:23

## 2024-05-13 RX ADMIN — HYDROMORPHONE HYDROCHLORIDE 1 MILLIGRAM(S): 2 INJECTION INTRAMUSCULAR; INTRAVENOUS; SUBCUTANEOUS at 10:27

## 2024-05-13 RX ADMIN — Medication 6 MILLIGRAM(S): at 22:23

## 2024-05-13 RX ADMIN — Medication 650 MILLIGRAM(S): at 15:14

## 2024-05-13 NOTE — PROGRESS NOTE ADULT - PROBLEM SELECTOR PLAN 1
-Pt has a history of CRPS type 1, diagnosed 8 yrs ago. Used to follow with pain management however left due to complications with physician, has not be able to find another pain doctor since  -Presents with right flank pain, radiating in the back area. Pt states this is day 3 of her flare up  -CT A/P no obstructive uropathy, but noted for right kidney stone, IUD, moderate stool, right flank spinal stimulator  -bowel regimen: senna and miralax  Patient has called office and is awaiting insurance verification to schedule appointment.   -Pain management consulted

## 2024-05-13 NOTE — PROGRESS NOTE ADULT - PROBLEM SELECTOR PLAN 4
Pt has a history of DM, takes Tresiba 54u qhs and 12u Novolog TID and Metformin 1000mg BID at home  - glucose elevated this am 392  - C/w mod ISS  - increase Lantus 50u qhs  - increase admelog 12 units tid before meals  - FS ACHS  - monitor for improvement

## 2024-05-13 NOTE — PROGRESS NOTE ADULT - SUBJECTIVE AND OBJECTIVE BOX
NP Note discussed with  Primary Attending    Patient is a 54y old  Female who presents with a chief complaint of Complex Regional Pain syndrome flare (13 May 2024 09:12)      INTERVAL HPI/OVERNIGHT EVENTS: no new complaints    MEDICATIONS  (STANDING):  ampicillin/sulbactam  IVPB      ampicillin/sulbactam  IVPB 3 Gram(s) IV Intermittent every 6 hours  atorvastatin 20 milliGRAM(s) Oral at bedtime  buMETAnide 2 milliGRAM(s) Oral daily  carvedilol 3.125 milliGRAM(s) Oral every 12 hours  enoxaparin Injectable 60 milliGRAM(s) SubCutaneous every 12 hours  insulin glargine Injectable (LANTUS) 54 Unit(s) SubCutaneous at bedtime  insulin lispro (ADMELOG) corrective regimen sliding scale   SubCutaneous three times a day before meals  insulin lispro (ADMELOG) corrective regimen sliding scale   SubCutaneous at bedtime  insulin lispro Injectable (ADMELOG) 12 Unit(s) SubCutaneous three times a day before meals  lidocaine   4% Patch 3 Patch Transdermal daily  melatonin 6 milliGRAM(s) Oral at bedtime  methocarbamol 750 milliGRAM(s) Oral every 8 hours  montelukast 10 milliGRAM(s) Oral at bedtime  pantoprazole    Tablet 40 milliGRAM(s) Oral before breakfast  potassium chloride    Tablet ER 20 milliEquivalent(s) Oral every 2 hours  pregabalin 100 milliGRAM(s) Oral three times a day  senna 2 Tablet(s) Oral at bedtime    MEDICATIONS  (PRN):  acetaminophen     Tablet .. 650 milliGRAM(s) Oral every 6 hours PRN Temp greater or equal to 38C (100.4F), Mild Pain (1 - 3)  acetaminophen     Tablet .. 1000 milliGRAM(s) Oral every 8 hours PRN Moderate Pain (4 - 6)  aluminum hydroxide/magnesium hydroxide/simethicone Suspension 30 milliLiter(s) Oral every 4 hours PRN Dyspepsia  lactulose Syrup 20 Gram(s) Oral three times a day PRN Constipation  ondansetron Injectable 4 milliGRAM(s) IV Push every 8 hours PRN Nausea and/or Vomiting  oxyCODONE    IR 20 milliGRAM(s) Oral every 8 hours PRN Severe Pain (7 - 10)      __________________________________________________  REVIEW OF SYSTEMS:    CONSTITUTIONAL: No fever,   EYES: no acute visual disturbances  NECK: No pain or stiffness  RESPIRATORY: No cough; No shortness of breath  CARDIOVASCULAR: No chest pain, no palpitations  GASTROINTESTINAL: No pain. No nausea or vomiting; No diarrhea   NEUROLOGICAL: No headache or numbness, no tremors  MUSCULOSKELETAL: No joint pain, no muscle pain  GENITOURINARY: no dysuria, no frequency, no hesitancy  PSYCHIATRY: no depression , no anxiety  ALL OTHER  ROS negative        Vital Signs Last 24 Hrs  T(C): 37.2 (13 May 2024 04:50), Max: 37.2 (13 May 2024 04:50)  T(F): 98.9 (13 May 2024 04:50), Max: 98.9 (13 May 2024 04:50)  HR: 77 (13 May 2024 04:50) (62 - 77)  BP: 150/80 (13 May 2024 04:50) (124/99 - 150/80)  BP(mean): --  RR: 18 (13 May 2024 04:50) (18 - 19)  SpO2: 94% (13 May 2024 04:50) (94% - 98%)    Parameters below as of 13 May 2024 04:50  Patient On (Oxygen Delivery Method): room air        ________________________________________________  PHYSICAL EXAM:  GENERAL: NAD  HEENT: Normocephalic;  conjunctivae and sclerae clear; moist mucous membranes;   NECK : supple  CHEST/LUNG: Clear to auscultation bilaterally with good air entry   HEART: S1 S2  regular; no murmurs, gallops or rubs  ABDOMEN: Soft, Nontender, Nondistended; Bowel sounds present  EXTREMITIES: no cyanosis; no edema; no calf tenderness  SKIN: warm and dry; no rash  NERVOUS SYSTEM:  Awake and alert; Oriented  to place, person and time ; no new deficits    _________________________________________________  LABS:                        11.0   13.55 )-----------( 340      ( 13 May 2024 05:45 )             34.0     05-13    140  |  106  |  8   ----------------------------<  148<H>  3.6   |  27  |  0.84    Ca    8.1<L>      13 May 2024 05:45  Mg     1.6     05-13        Urinalysis Basic - ( 13 May 2024 05:45 )    Color: x / Appearance: x / SG: x / pH: x  Gluc: 148 mg/dL / Ketone: x  / Bili: x / Urobili: x   Blood: x / Protein: x / Nitrite: x   Leuk Esterase: x / RBC: x / WBC x   Sq Epi: x / Non Sq Epi: x / Bacteria: x      CAPILLARY BLOOD GLUCOSE      POCT Blood Glucose.: 161 mg/dL (13 May 2024 07:41)  POCT Blood Glucose.: 145 mg/dL (12 May 2024 20:57)  POCT Blood Glucose.: 160 mg/dL (12 May 2024 16:32)        RADIOLOGY & ADDITIONAL TESTS:    Imaging  Reviewed:  YES/NO    Consultant(s) Notes Reviewed:   YES/ No      Plan of care was discussed with patient and /or primary care giver; all questions and concerns were addressed  NP Note discussed with  Primary Attending    Patient is a 54y old  Female who presents with a chief complaint of Complex Regional Pain syndrome flare (13 May 2024 09:12)      INTERVAL HPI/OVERNIGHT EVENTS: no new complaints    MEDICATIONS  (STANDING):  ampicillin/sulbactam  IVPB      ampicillin/sulbactam  IVPB 3 Gram(s) IV Intermittent every 6 hours  atorvastatin 20 milliGRAM(s) Oral at bedtime  buMETAnide 2 milliGRAM(s) Oral daily  carvedilol 3.125 milliGRAM(s) Oral every 12 hours  enoxaparin Injectable 60 milliGRAM(s) SubCutaneous every 12 hours  insulin glargine Injectable (LANTUS) 54 Unit(s) SubCutaneous at bedtime  insulin lispro (ADMELOG) corrective regimen sliding scale   SubCutaneous three times a day before meals  insulin lispro (ADMELOG) corrective regimen sliding scale   SubCutaneous at bedtime  insulin lispro Injectable (ADMELOG) 12 Unit(s) SubCutaneous three times a day before meals  lidocaine   4% Patch 3 Patch Transdermal daily  melatonin 6 milliGRAM(s) Oral at bedtime  methocarbamol 750 milliGRAM(s) Oral every 8 hours  montelukast 10 milliGRAM(s) Oral at bedtime  pantoprazole    Tablet 40 milliGRAM(s) Oral before breakfast  potassium chloride    Tablet ER 20 milliEquivalent(s) Oral every 2 hours  pregabalin 100 milliGRAM(s) Oral three times a day  senna 2 Tablet(s) Oral at bedtime    MEDICATIONS  (PRN):  acetaminophen     Tablet .. 650 milliGRAM(s) Oral every 6 hours PRN Temp greater or equal to 38C (100.4F), Mild Pain (1 - 3)  acetaminophen     Tablet .. 1000 milliGRAM(s) Oral every 8 hours PRN Moderate Pain (4 - 6)  aluminum hydroxide/magnesium hydroxide/simethicone Suspension 30 milliLiter(s) Oral every 4 hours PRN Dyspepsia  lactulose Syrup 20 Gram(s) Oral three times a day PRN Constipation  ondansetron Injectable 4 milliGRAM(s) IV Push every 8 hours PRN Nausea and/or Vomiting  oxyCODONE    IR 20 milliGRAM(s) Oral every 8 hours PRN Severe Pain (7 - 10)      __________________________________________________  REVIEW OF SYSTEMS:    CONSTITUTIONAL: Left cheek pain   EYES: no acute visual disturbances  NECK: No pain or stiffness  RESPIRATORY: No cough; No shortness of breath  CARDIOVASCULAR: No chest pain, no palpitations  GASTROINTESTINAL: No pain. No nausea or vomiting; No diarrhea   NEUROLOGICAL: No headache or numbness, no tremors  MUSCULOSKELETAL: lower back pain  GENITOURINARY: no dysuria, no frequency, no hesitancy  PSYCHIATRY: no depression , no anxiety  ALL OTHER  ROS negative        Vital Signs Last 24 Hrs  T(C): 37.2 (13 May 2024 04:50), Max: 37.2 (13 May 2024 04:50)  T(F): 98.9 (13 May 2024 04:50), Max: 98.9 (13 May 2024 04:50)  HR: 77 (13 May 2024 04:50) (62 - 77)  BP: 150/80 (13 May 2024 04:50) (124/99 - 150/80)  BP(mean): --  RR: 18 (13 May 2024 04:50) (18 - 19)  SpO2: 94% (13 May 2024 04:50) (94% - 98%)    Parameters below as of 13 May 2024 04:50  Patient On (Oxygen Delivery Method): room air        ________________________________________________  PHYSICAL EXAM:  GENERAL: NAD  HEENT: Normocephalic;  conjunctivae and sclerae clear; moist mucous membranes;   NECK : supple  CHEST/LUNG: Clear to auscultation bilaterally with good air entry   HEART: S1 S2  regular; no murmurs, gallops or rubs  ABDOMEN: obese,   Soft, Nontender, Nondistended; Bowel sounds present  EXTREMITIES: no cyanosis; no edema; no calf tenderness  SKIN: warm and dry; no rash  NERVOUS SYSTEM:  Awake and alert; Oriented  to place, person and time ; no new deficits    _________________________________________________  LABS:                        11.0   13.55 )-----------( 340      ( 13 May 2024 05:45 )             34.0     05-13    140  |  106  |  8   ----------------------------<  148<H>  3.6   |  27  |  0.84    Ca    8.1<L>      13 May 2024 05:45  Mg     1.6     05-13        Urinalysis Basic - ( 13 May 2024 05:45 )    Color: x / Appearance: x / SG: x / pH: x  Gluc: 148 mg/dL / Ketone: x  / Bili: x / Urobili: x   Blood: x / Protein: x / Nitrite: x   Leuk Esterase: x / RBC: x / WBC x   Sq Epi: x / Non Sq Epi: x / Bacteria: x      CAPILLARY BLOOD GLUCOSE      POCT Blood Glucose.: 161 mg/dL (13 May 2024 07:41)  POCT Blood Glucose.: 145 mg/dL (12 May 2024 20:57)  POCT Blood Glucose.: 160 mg/dL (12 May 2024 16:32)        RADIOLOGY & ADDITIONAL TESTS:  < from: CT Maxillofacial w/ IV Cont (05.08.24 @ 14:20) >  ACC: [id number] EXAM:  CT MAXILLOFACIAL  IC   ORDERED BY: BEN HANKS     PROCEDURE DATE:  05/08/2024          INTERPRETATION:  Clinical indication: Cheeks swelling.    Serial thin sections on a multi slice scanner were obtained through the   orbits and paranasal sinuses after contrast infusion with sagittal and   coronal computer-generated reconstructed views. 90 cc of Omnipaque 350   were intravenously injected, 10 cc were discarded.    Comparison is made with the prior maxillofacial CT of7/20/2023.    There is soft tissue swelling overlying the maxilla with inflammatory   changes in the fat. There is a air containing collection within this area   of soft tissue swelling. The collection measures 2.1 cm in AP diameter by   2.6 cm transversely by 2.8 cm in craniocaudal diameter. The air   collection measures 1.6 cm in AP diameter by 2 cm transversely by 2.8 cm   in craniocaudal diameter. This is consistent with an abscess with air   which may be iatrogenic.    There is no adjacent bone destruction. There is no adjacent periodontal   disease. There is a retention cyst or polyp in the right maxillary sinus.   The malar body and zygomatic arch is intact. The nasal septum is midline.   The left maxillary sinus and ethmoid sphenoid and frontal sinuses are   clear.    The visualized vascular structures are intact. The uvula is normal, the   epiglottis is normal. The visualized salivary glands are normal.    IMPRESSION: Soft tissue swelling with air containing collection overlying   the right malar body and the premaxillary space consistent with an   air-containing abscess which is new since 7/20/2023.    --- End of Report ---    < end of copied text >  < from: CT Abdomen and Pelvis No Cont (05.08.24 @ 06:31) >    ACC: [id number] EXAM:  CT ABDOMEN AND PELVIS   ORDERED BY: TRACE ROYAL     PROCEDURE DATE:  05/08/2024          INTERPRETATION:  CLINICAL INFORMATION: Right flank pain    COMPARISON: CT abdomen pelvis 1/22/2023    CONTRAST/COMPLICATIONS:  IV Contrast: None  Oral Contrast: None  Complications: None    PROCEDURE:  CT of the Abdomen and Pelvis was performed.  Sagittal and coronal reformats were performed.    FINDINGS:  LOWER CHEST: Aortic root, coronary artery, mitral annular calcifications.   Linear subsegmental atelectasis in the lingula.    LIVER: Within normal limits.  BILE DUCTS: Normal caliber.  GALLBLADDER: Cholecystectomy.  SPLEEN: Within normal limits.  PANCREAS: Within normal limits.  ADRENALS: Within normal limits.  KIDNEYS/URETERS: Punctate nonobstructing stone in the interpolar region   of the right kidney. No hydronephrosis or perinephric inflammation.    BLADDER: Within normal limits.  REPRODUCTIVE ORGANS: Intrauterine device. No gross adnexal masses.    BOWEL: Duodenal diverticulum. No bowel obstruction. Appendix is normal.   Moderate stool in the rectosigmoid colon. No bowel wall thickening or   pericolonic inflammatory change.  PERITONEUM: No ascites.  VESSELS: Mild atherosclerotic changes.  RETROPERITONEUM/LYMPH NODES: No lymphadenopathy.  ABDOMINAL WALL: Small fat-containing umbilical hernia. Spinal stimulator   generator in the right flank subcutaneous soft tissues. Mild diffuse   anasarca.  BONES: Degenerative changes. Spinal stimulator electrodes.    IMPRESSION:  No evidence for obstructive uropathy.        --- End of Report ---    < end of copied text >  < from: Xray Chest 2 Views PA/Lat (05.08.24 @ 01:26) >  ACC: [id number] EXAM:  XR CHEST PA LAT 2V   ORDERED BY: TRACE ROYAL     PROCEDURE DATE:  05/08/2024          INTERPRETATION:  EXAM: XR CHEST PA AND LATERAL    INDICATION: R flpeña kpain HXR    COMPARISON: March 23, 2024    IMPRESSION: Some linear scarring and/or platelike atelectatic change in   the left and right chest. Some slight prominence to interstitial markings   in the right infrahilar region which could represent an inflammatory   infectious process and/or atelectatic change suggested on the frontal   view. Heart is within normal limits in its transthoracic diameter.   Regional osseous structures appropriate for age    --- End of Report ---    < end of copied text >    Imaging  Reviewed:  YES    Consultant(s) Notes Reviewed:   YES      Plan of care was discussed with patient and /or primary care giver; all questions and concerns were addressed

## 2024-05-13 NOTE — PROGRESS NOTE ADULT - SUBJECTIVE AND OBJECTIVE BOX
Source of information: ROBIN LÓPEZ, Chart review  Patient language: English  : n/a    HPI:  This is a 53 y/o F, from home, lives alone, ambulates with cane/walker/wheelchair, with PMHx of DM, HTN, HLD, CHF, complex regional pain syndrome of right back intercostal muscles s/p spinal cord stimulator (Mar 2023) who presents with right flank pain. Pt states she is having a flare up of her complex regional pain syndrome that started 2 days ago. Pt mentions she ran out of her pain medications and has been taking up to six 800mg Ibuprofen daily. Pt does not follow with a pain specialist currently. Pt also states she had a recent lipoma resection from her right cheek (4/15/2024) after which she completed a course of antibiotics. Pt states she was having drainage and went to see her plastic surgeon on May 2nd, after which they placed a needle in the cheek and drained out fluid. Pt states that since that time she has not had any further fluid drain. Pt mentions she now has pain and slightly increased swelling in the cheek area. Pt mentions she had a 100.6 fever this morning and felt chills. Pt denies any recent travel, recent illness, dysuria, CP, SOB, N/V/D, or constipation. (08 May 2024 09:45)    Pt is admitted for intractable right flank pain. Pain service consulted on 5/8 for management of right flank pain. Per patient, she was diagnosed with complex regional pain syndrome and right sided intercostal neuralgia in 2016. Since her diagnosis she has trialed numerous medications/interventions without relief, including (amitriptyline and duloxetine which caused patti, max dose of gabapentin, and epidural spinal injections). She was recommended a spinal cord stimulator which was placed in March 2023, however she reports that it is not effective in managing her symptoms. She does not currently have an outpatient pain management provider and has been relying on her PCP to prescribe opioids to manage her pain. Patient also present with right facial swelling/droop since 4/24 after removal of right cheek mass. CT Maxillofacial demonstrates soft tissue swelling with air containing collection overlying the right malar body and the premaxillary space consistent with an air-containing abscess which is new since 7/20/2023.   Pt seen and examined this morning, while sitting in bedside chair. Pt found sitting up in bed, A&Ox4, grimacing in pain, reports pain score "10/10" to right flank and right low back. SCALE USED: (1-10 VNRS). Pt describes pain as constant, sharp, burning, and aching in quality, also reports intermittent muscle spasms. The pain is mildly alleviated with current regimen and is exacerbated by movement and taking deep inhalations. Her pain tolerance is high and her base pain level is 9/10. Pt tolerating PO diet. Denies lethargy, chest pain, SOB, nausea, vomiting. She reports history of constipation. Last BM 5/12. Patient stated goal for pain control: to be able to take deep breaths, get out of bed to chair and ambulate with tolerable pain control. Patient ambulates with rollator/straight cane at baseline and uses wheelchair for long distances. Per patient, her most recent pain regimen includes xanaflex 4mg TID, Lyrica 100mg TID, and oxycodone 20mg BID. Pt also reports being ad admitted to [facility] on 3/2024 with same symptoms and flare where she was treated with Dilaudid IV and Ketamine IV to control acute pain and then DC'd home.     PAST MEDICAL & SURGICAL HISTORY:  HTN (hypertension)    Neuropathy    Obesity    Former cigarette smoker  (smoked x 45 years; quit ~2013)    Marijuana smoker, continuous  (states smokes 3 - 4 times per day for pain management reasons)    Neuralgia  (pt states hx/o "intercostal neuralgia")    Diabetes    Essential hypertension    IBS (irritable bowel syndrome)    Complex regional pain syndrome type 1    DM2 (diabetes mellitus, type 2)    Intercostal neuralgia    Localized swelling, mass and lump, head    Asthma    History of CHF (congestive heart failure)    Fibroids    Idiopathic intracranial hypertension    History of cholecystectomy    History of hand surgery  (pt states she had 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12)    Spinal cord neurostimulator device in situ    History of removal of cyst    FAMILY HISTORY:  FH: HTN (hypertension)    Social History:  Former smoker, quit in 2014, up to 1 PPD for 35 years  denies EtOH use (08 May 2024 09:45)   [x]Denies ETOH use, illicit drug use, and smoking     Allergies    amitriptyline (Other)  duloxetine (Other)    Intolerances    MEDICATIONS  (STANDING):  ampicillin/sulbactam  IVPB      ampicillin/sulbactam  IVPB 3 Gram(s) IV Intermittent every 6 hours  atorvastatin 20 milliGRAM(s) Oral at bedtime  buMETAnide 2 milliGRAM(s) Oral daily  carvedilol 3.125 milliGRAM(s) Oral every 12 hours  enoxaparin Injectable 60 milliGRAM(s) SubCutaneous every 12 hours  insulin glargine Injectable (LANTUS) 54 Unit(s) SubCutaneous at bedtime  insulin lispro (ADMELOG) corrective regimen sliding scale   SubCutaneous three times a day before meals  insulin lispro (ADMELOG) corrective regimen sliding scale   SubCutaneous at bedtime  insulin lispro Injectable (ADMELOG) 12 Unit(s) SubCutaneous three times a day before meals  lidocaine   4% Patch 3 Patch Transdermal daily  melatonin 6 milliGRAM(s) Oral at bedtime  methocarbamol 750 milliGRAM(s) Oral every 8 hours  montelukast 10 milliGRAM(s) Oral at bedtime  pantoprazole    Tablet 40 milliGRAM(s) Oral before breakfast  potassium chloride    Tablet ER 20 milliEquivalent(s) Oral every 2 hours  pregabalin 100 milliGRAM(s) Oral three times a day  senna 2 Tablet(s) Oral at bedtime    MEDICATIONS  (PRN):  acetaminophen     Tablet .. 650 milliGRAM(s) Oral every 6 hours PRN Temp greater or equal to 38C (100.4F), Mild Pain (1 - 3)  aluminum hydroxide/magnesium hydroxide/simethicone Suspension 30 milliLiter(s) Oral every 4 hours PRN Dyspepsia  lactulose Syrup 20 Gram(s) Oral three times a day PRN Constipation  ondansetron Injectable 4 milliGRAM(s) IV Push every 8 hours PRN Nausea and/or Vomiting  oxyCODONE    IR 20 milliGRAM(s) Oral every 8 hours PRN Severe Pain (7 - 10)    Vital Signs Last 24 Hrs  T(C): 37.2 (13 May 2024 04:50), Max: 37.2 (13 May 2024 04:50)  T(F): 98.9 (13 May 2024 04:50), Max: 98.9 (13 May 2024 04:50)  HR: 77 (13 May 2024 04:50) (62 - 77)  BP: 150/80 (13 May 2024 04:50) (124/99 - 150/80)  BP(mean): --  RR: 18 (13 May 2024 04:50) (18 - 19)  SpO2: 94% (13 May 2024 04:50) (94% - 98%)    Parameters below as of 13 May 2024 04:50  Patient On (Oxygen Delivery Method): room air    SARS-CoV-2: NotDetec (25 Mar 2024 15:31)  SARS-CoV-2: NotDetec (08 Dec 2023 01:28)    LABS: Reviewed                          11.0   13.55 )-----------( 340      ( 13 May 2024 05:45 )             34.0     05-13    140  |  106  |  8   ----------------------------<  148<H>  3.6   |  27  |  0.84    Ca    8.1<L>      13 May 2024 05:45  Mg     1.6     05-13    Urinalysis Basic - ( 13 May 2024 05:45 )    Color: x / Appearance: x / SG: x / pH: x  Gluc: 148 mg/dL / Ketone: x  / Bili: x / Urobili: x   Blood: x / Protein: x / Nitrite: x   Leuk Esterase: x / RBC: x / WBC x   Sq Epi: x / Non Sq Epi: x / Bacteria: x    CAPILLARY BLOOD GLUCOSE    POCT Blood Glucose.: 161 mg/dL (13 May 2024 07:41)  POCT Blood Glucose.: 145 mg/dL (12 May 2024 20:57)  POCT Blood Glucose.: 160 mg/dL (12 May 2024 16:32)  POCT Blood Glucose.: 249 mg/dL (12 May 2024 11:29)    SARS-CoV-2: NotDetec (25 Mar 2024 15:31)  SARS-CoV-2: NotDetec (08 Dec 2023 01:28)    Radiology: Reviewed    ACC: [id number] EXAM:  CT MAXILLOFACIAL  IC   ORDERED BY: BEN HANKS     PROCEDURE DATE:  05/08/2024      INTERPRETATION:  Clinical indication: Cheeks swelling.    Serial thin sections on a multi slice scanner were obtained through the   orbits and paranasal sinuses after contrast infusion with sagittal and   coronal computer-generated reconstructed views. 90 cc of Omnipaque 350   were intravenously injected, 10 cc were discarded.    Comparison is made with the prior maxillofacial CT of7/20/2023.    There is soft tissue swelling overlying the maxilla with inflammatory   changes in the fat. There is a air containing collection within this area   of soft tissue swelling. The collection measures 2.1 cm in AP diameter by   2.6 cm transversely by 2.8 cm in craniocaudal diameter. The air   collection measures 1.6 cm in AP diameter by 2 cm transversely by 2.8 cm   in craniocaudal diameter. This is consistent with an abscess with air   which may be iatrogenic.    There is no adjacent bone destruction. There is no adjacent periodontal   disease. There is a retention cyst or polyp in the right maxillary sinus.   The malar body and zygomatic arch is intact. The nasal septum is midline.   The left maxillary sinus and ethmoid sphenoid and frontal sinuses are   clear.    The visualized vascular structures are intact. The uvula is normal, the   epiglottis is normal. The visualized salivary glands are normal.    IMPRESSION: Soft tissue swelling with air containing collection overlying   the right malar body and the premaxillary space consistent with an   air-containing abscess which is new since 7/20/2023.    --- End of Report ---    ELMA SIDHU MD; Attending Radiologist  This document has been electronically signed. May8 2024  4:35PM  ACC: [id number] EXAM:  CT ABDOMEN AND PELVIS   ORDERED BY: TRACE ROYAL     PROCEDURE DATE:  05/08/2024      INTERPRETATION:  CLINICAL INFORMATION: Right flank pain    COMPARISON: CT abdomen pelvis 1/22/2023    CONTRAST/COMPLICATIONS:  IV Contrast: None  Oral Contrast: None  Complications: None    PROCEDURE:  CT of the Abdomen and Pelvis was performed.  Sagittal and coronal reformats were performed.    FINDINGS:  LOWER CHEST: Aortic root, coronary artery, mitral annular calcifications.   Linear subsegmental atelectasis in the lingula.    LIVER: Within normal limits.  BILE DUCTS: Normal caliber.  GALLBLADDER: Cholecystectomy.  SPLEEN: Within normal limits.  PANCREAS: Within normal limits.  ADRENALS: Within normal limits.  KIDNEYS/URETERS: Punctate nonobstructing stone in the interpolar region   of the right kidney. No hydronephrosis or perinephric inflammation.    BLADDER: Within normal limits.  REPRODUCTIVE ORGANS: Intrauterine device. No gross adnexal masses.    BOWEL: Duodenal diverticulum. No bowel obstruction. Appendix is normal.   Moderate stool in the rectosigmoid colon. No bowel wall thickening or   pericolonic inflammatory change.  PERITONEUM: No ascites.  VESSELS: Mild atherosclerotic changes.  RETROPERITONEUM/LYMPH NODES: No lymphadenopathy.  ABDOMINAL WALL: Small fat-containing umbilical hernia. Spinal stimulator   generator in the right flank subcutaneous soft tissues. Mild diffuse   anasarca.  BONES: Degenerative changes. Spinal stimulator electrodes.    IMPRESSION:  No evidence for obstructive uropathy.    --- End of Report ---    MILE COSTELLO MD; Attending Radiologist  This document has been electronically signed. May  8 2024  6:54AM  ACC: [id number] EXAM:  XR CHEST PA LAT 2V   ORDERED BY: TRACE ROYAL     PROCEDURE DATE:  05/08/2024      INTERPRETATION:  EXAM: XR CHEST PA AND LATERAL    INDICATION: R flan kpain HXR    COMPARISON: March 23, 2024    IMPRESSION: Some linear scarring and/or platelike atelectatic change in   the left and right chest. Some slight prominence to interstitial markings   in the right infrahilar region which could represent an inflammatory   infectious process and/or atelectatic change suggested on the frontal   view. Heart is within normal limits in its transthoracic diameter.   Regional osseous structures appropriate for age    --- End of Report ---    MARK BECK MD; Attending Radiologist  This document has been electronically signed. May  8 2024  9:00AM    ORT Score -   Family Hx of substance abuse	Female	      Male  Alcohol 	                                           1                     3  Illegal drugs	                                   2                     3  Rx drugs                                           4 	                  4  Personal Hx of substance abuse		  Alcohol 	                                          3	                  3  Illegal drugs                                     4	                  4  Rx drugs                                            5 	                  5  Age between 16- 45 years	           1                     1  hx preadolescent sexual abuse	   3 	                  0  Psychological disease		  ADD, OCD, bipolar, schizophrenia   2	          2  Depression                                           1 	          1  Total: 0    a score of 3 or lower indicates low risk for opioid abuse		  a score of 4-7 indicates moderate risk for opioid abuse		  a score of 8 or higher indicates high risk for opioid abuse    REVIEW OF SYSTEMS:  CONSTITUTIONAL: No fever or fatigue  HEENT:  No difficulty hearing, no change in vision  NECK: No pain or stiffness  RESPIRATORY: No cough, wheezing, chills or hemoptysis; No shortness of breath  CARDIOVASCULAR: No chest pain, palpitations, dizziness, or leg swelling  GASTROINTESTINAL: No loss of appetite, decreased PO intake. No abdominal or epigastric pain. No nausea, vomiting; No diarrhea or constipation.   GENITOURINARY: No dysuria, frequency, hematuria, retention or incontinence  MUSCULOSKELETAL: + right flank pain; + right upper and lower back pain, chronic upper or lower motor strength weakness due to pain, no saddle anesthesia, bowel/bladder incontinence, no falls   NEURO: No headaches, + numbness/tingling b/l LE, No weakness  ENDOCRINE: No polyuria, polydipsia, heat or cold intolerance; No hair loss  PSYCHIATRIC: No depression, anxiety or difficulty sleeping    PHYSICAL EXAM:  GENERAL:  Alert & Oriented X4, cooperative, NAD, Good concentration. Speech is clear, (right facial droop, since 4/2024 after surgical removal of right cheek mass.)  RESPIRATORY: Respirations even and unlabored. Clear to auscultation bilaterally  CARDIOVASCULAR: Normal S1/S2, regular rate and rhythm  GASTROINTESTINAL:  Soft, obese per BMI, right flank tenderness, bowel sounds present  PERIPHERAL VASCULAR: Extremities warm without edema. 2+ Peripheral Pulses, No cyanosis, No calf tenderness  MUSCULOSKELETAL: Motor Strength 4/5 B/L upper and lower extremities; moves all extremities equally against gravity; + right upper and lower back tenderness  SKIN: Warm, dry, intact, spinal cord stimulator battery palpable to right low back, mid back scar from spinal cord stimulator insertion    Risk factors associated with adverse outcomes related to opioid treatment  [ ]  Concurrent benzodiazepine use  [ ]  History/ Active substance use or alcohol use disorder  [ ] Psychiatric co-morbidity  [ ] Sleep apnea  [ ] COPD  [x] BMI> 35  [ ] Liver dysfunction  [ ] Renal dysfunction  [ ] CHF  [x] Former smoker  [ ]  Age > 60 years    [x]  [place]  Reviewed and Copied to Chart. See below.    Plan of care and goal oriented pain management treatment options were discussed with patient and /or primary care giver; all questions and concerns were addressed and care was aligned with patient's wishes.    Educated patient on goal oriented pain management treatment options

## 2024-05-13 NOTE — PROGRESS NOTE ADULT - ASSESSMENT
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 Admitted for intractable pain management and right facial swelling. Pt is s/p lipoma resection 4/15 [facility]. pt had drainage and had needle placed in cheek by outpatient plastic surgeon, no further drainage was noted by plastic surgeon.   CT abdomen/Pelvis showed no obstructive uropathy, but noted for right kidney stone, IUD, moderate stool, right flank spinal stimulator. CT maxillofacial showed  soft tissue swelling with air containing collection overlying the right malar body and the premaxillary space consistent with an air-containing abscess which is new since 7/20/2023. Patient treated with Unasyn. Patient admitted to medicine for complex regional pain syndrome Type 1. Patient pain not improving, pain management consulted. Pain management recommending outpatient follow up.   Patient can follow up with Dr. Hay- [phone], upon discharge from hospital.

## 2024-05-13 NOTE — PROGRESS NOTE ADULT - PROBLEM SELECTOR PLAN 3
-s/p lipoma resection 4/15 and [facility]. Checked up on last week, had needle placed in cheek, no further drainage  mild fever at home, now with slightly increased swelling  -c/w unasyn  -add viscous lidocaine q4h PRN  -send blood culture if patient is febrile  -trend esr and crp  -ID Dr. Short following   -has outpt plastic follow up on 5/14 for possible additional drainage

## 2024-05-13 NOTE — PROGRESS NOTE ADULT - PROBLEM SELECTOR PLAN 1
Pt with chronic right flank pain which is somatic and neuropathic in nature due to history of complex regional pain syndrome and right intercostal neuralgia. Since her dx tried numerous medications/interventions without relief, including (amitriptyline and duloxetine which caused patti, max dose of gabapentin, and epidural spinal injections). She was recommended a spinal cord stimulator which was placed in 3/23, however she reports that it is not effective in managing her symptoms. She does not currently have an outpatient pain management provider and has been relying on her PCP to prescribe opioids to manage her pain.   Opioid pain recommendations   - Discontinue Dilaudid 1 mg IVP q4h PRN for severe pain for acute flare pain. (In preparation for discharge home patient needs PO regimen to appropriately manage pain)   - Start oxycodone 20mg PO q 8hrs PRN for severe pain. (patient's most recent home dose, verified on iSTOP) Monitor for respiratory depression/sedation.  Non-opioid pain recommendations   - Continue Robaxin 750 mg po q8h x 5 days. Monitor drowsiness.  - Completed Acetaminophen 1 gram PO q 8 hours x 3 days. Start 1g q 8hrs PRN for moderate pain.   - Continue Lyrica 100mg q 8hrs. (home med)  - Continue Lidoderm 4% patch daily. x 3 (12 hrs on/12 hrs off)  Bowel Regimen  - Continue Miralax 17G PO daily   - Continue Senna 2 tablets at bedtime for constipation  Mild pain (score 1-3)  - Non-pharmacological pain treatment recommendations  - Warm/ Cool packs PRN   - Repositioning extremity, elevation, imagery, relaxation, distraction.  - Physical therapy OOB if no contraindications   Recommendations discussed with primary team and RN.    *** Patient can follow up with Dr. Hay- [phone], upon discharge from hospital ***. Patient has called office and is awaiting insurance verification to schedule appointment.

## 2024-05-13 NOTE — PROGRESS NOTE ADULT - ASSESSMENT
Search Terms: Fauzia Linares, 1970Search Date: 05/08/2024 09:30:53 AM  The Drug Utilization Report below displays all of the controlled substance prescriptions, if any, that your patient has filled in the last twelve months. The information displayed on this report is compiled from pharmacy submissions to the Department, and accurately reflects the information as submitted by the pharmacies.    This report was requested by: Danni Silva | Reference #: [id number]    Practitioner Count: 5  Pharmacy Count: 2  Current Opioid Prescriptions: 1  Current Benzodiazepine Prescriptions: 0  Current Stimulant Prescriptions: 0      Patient Demographic Information (PDI)       PDI	First Name	Last Name	Birth Date	Gender	Street Address	[facility]	Zip Code  A	Fauzia Linares	1970	Female	[address]	[place]	[address]  B	Fauzia Linares	1970	Female	[address]	[place]	[address]  C	Fauzia Linares	1970	Female	[address]	[facility]	[place]	[address]  D	Fauzia Linares	1970	Female	[address]	[facility]	[place]	[address]  E	Fauzia Linares	1970	Female	[address]	[facility]	[place]	[address]  F	Fauzia Linaers	1970	Female	[address]	[facility]	[place]	[address]  G	Fauzia Linares	1970	Female	[address]	[place]	[address]    Prescription Information      PDI Filter:    PDI	My Rx	Current Rx	Drug Type	Rx Written	Rx Dispensed	Drug	Quantity	Days Supply	Prescriber Name	Prescriber CROW #	Payment Method	Dispenser  A	N	N	O	03/26/2024	03/26/2024	oxycodone hcl (ir) 5 mg tablet	30	5	Quinones, Elizabeth MERLOS	[id number]	[facility]	N	N	O	03/26/2024	03/26/2024	oxycodone hcl (ir) 10 mg tab	30	5	Quinones, Elizabeth MERLOS	[id number]	[facility]  A	N	N	O	03/26/2024	03/26/2024	oxycontin er 10 mg tablet	10	5	Quinones, Elizabeth MERLOS	[id number]	[facility]  B	N	N		10/06/2023	10/07/2023	pregabalin 100 mg capsule	90	30	Tc, Reba	[id number]	Medicare	[facility]  B	N	N	O	09/28/2023	10/01/2023	oxycodone hcl (ir) 15 mg tab	30	5	Tc, Reba	[id number]	Medicare	[facility]  B	N	N	O	09/27/2023	09/27/2023	oxycodone hcl (ir) 15 mg tab	30	8	Tc, Reba	[id number]	Medicare	[facility]  B	N	N	O	09/10/2023	09/10/2023	oxycodone hcl (ir) 15 mg tab	30	5	Tc, Reba	[id number]	Medicare	[facility]  B	N	N		09/06/2023	09/06/2023	pregabalin 100 mg capsule	90	30	Tc, Reba	[id number]	Medicare	[facility]  B	N	N	O	08/27/2023	08/28/2023	oxycodone hcl (ir) 15 mg tab	30	5	Tc, Reba	[id number]	Medicare	[facility]  B	N	N	O	08/12/2023	08/13/2023	oxycodone hcl (ir) 15 mg tab	30	5	Tc, Reba	[id number]	Medicare	[facility]  B	N	N		08/04/2023	08/05/2023	pregabalin 100 mg capsule	90	30	Tc, Reba	[id number]	Medicare	[facility]  B	N	N		07/21/2023	07/22/2023	pregabalin 100 mg capsule	42	14	Tc, Reba	[id number]	Medicare	[facility]  B	N	N	O	07/16/2023	07/16/2023	oxycodone hcl (ir) 15 mg tab	30	7	Tc, Reba	[id number]	Medicare	[facility]  B	N	N	O	06/30/2023	07/01/2023	oxycodone hcl (ir) 15 mg tab	42	10	Tc, Reba	[id number]	Medicare	[facility]  B	N	N		06/19/2023	06/20/2023	pregabalin 100 mg capsule	90	30	Tc, Reba	[id number]	Medicare	[facility]  B	N	N	O	06/06/2023	06/07/2023	oxycodone hcl (ir) 15 mg tab	42	7	Tc, Reba	[id number]	Medicare	[facility]  B	N	N	O	05/22/2023	05/23/2023	oxycodone hcl (ir) 15 mg tab	42	10	Tc, Reba	[id number]	Medicare	[facility]  B	N	N		05/19/2023	05/20/2023	pregabalin 100 mg capsule	90	30	Tc, Reba	[id number]	Medicare	[facility]  C	N	N	O	11/28/2023	11/30/2023	oxycodone hcl (ir) 20 mg tab	60	30	Vinny Maldonado MD	[id number]	Insurance	[id number]  C	N	N	O	11/16/2023	11/18/2023	oxycodone-acetaminophen  mg tab	30	15	Vinny Maldonado MD	[id number]	Insurance	[id number]  C	N	N		11/16/2023	11/18/2023	pregabalin 100 mg capsule	90	30	Vinny Maldonado MD	[id number]	Insurance	[id number]  C	N	N		09/28/2023	11/01/2023	pregabalin 100 mg capsule	30	10	Tc, Reba	[id number]	Insurance	[id number]  D	N	N	O	12/29/2023	12/29/2023	oxycodone hcl (ir) 15 mg tab	12	3	Christopher Montiel	[id number]	Insurance	[facility]  D	N	N		12/29/2023	12/29/2023	butalbital-acetaminophen-caffeine -40 mg tablet	53	8	Christopher Montiel	[id number]	Insurance	[facility]	N	N	O	10/12/2023	10/14/2023	oxycodone hcl (ir) 15 mg tab	28	7	Naomi Ybarra	[id number]	Insurance	[id number]  E	N	N		02/08/2024	02/09/2024	pregabalin 100 mg capsule	42	7	Alba Jasso M	[id number]	Insurance	[facility]  E	N	N	O	02/08/2024	02/09/2024	hydromorphone 4 mg tablet	20	5	Alba Jasso M	[id number]	[facility]  E	N	N		01/25/2024	01/26/2024	pregabalin 100 mg capsule	90	30	Vinny Maldonado MD	[id number]	[facility]	N	N	O	01/25/2024	01/26/2024	oxycodone hcl (ir) 20 mg tab	60	30	Vinny Maldonado MD	[id number]	[facility]	N	Y		04/03/2024	05/06/2024	pregabalin 100 mg capsule	90	30	Vinny Maldonado MD	[id number]	[facility]	N	Y	O	04/30/2024	05/02/2024	oxycodone hcl (ir) 20 mg tab	60	30	Vinny Maldonado MD	[id number]	[facility]	N	N	O	04/16/2024	04/17/2024	oxycodone hcl (ir) 5 mg tablet	10	3	Frank Ba	[id number]	[facility]	N	N		04/03/2024	04/04/2024	pregabalin 100 mg capsule	90	30	Vinny Maldonado MD	[id number]	[facility]	N	N	O	03/04/2024	03/05/2024	hydromorphone 4 mg tablet	20	5	Bonny Bryant	[id number]	[facility]  G	N	N		09/28/2023	10/02/2023	pregabalin 100 mg capsule	30	10	Reba Montez	[id number]	[id number]    * - Details of Drug Type : O = Opioid, B = Benzodiazepine, S = Stimulant    * - Drugs marked with an asterisk are compound drugs. If the compound drug is made up of more than one controlled substance, then each controlled substance will be a separate row in the table.

## 2024-05-14 LAB
ANION GAP SERPL CALC-SCNC: 6 MMOL/L — SIGNIFICANT CHANGE UP (ref 5–17)
BUN SERPL-MCNC: 10 MG/DL — SIGNIFICANT CHANGE UP (ref 7–18)
CALCIUM SERPL-MCNC: 8.2 MG/DL — LOW (ref 8.4–10.5)
CHLORIDE SERPL-SCNC: 105 MMOL/L — SIGNIFICANT CHANGE UP (ref 96–108)
CO2 SERPL-SCNC: 29 MMOL/L — SIGNIFICANT CHANGE UP (ref 22–31)
CREAT SERPL-MCNC: 0.79 MG/DL — SIGNIFICANT CHANGE UP (ref 0.5–1.3)
EGFR: 89 ML/MIN/1.73M2 — SIGNIFICANT CHANGE UP
GLUCOSE BLDC GLUCOMTR-MCNC: 129 MG/DL — HIGH (ref 70–99)
GLUCOSE BLDC GLUCOMTR-MCNC: 137 MG/DL — HIGH (ref 70–99)
GLUCOSE BLDC GLUCOMTR-MCNC: 153 MG/DL — HIGH (ref 70–99)
GLUCOSE BLDC GLUCOMTR-MCNC: 234 MG/DL — HIGH (ref 70–99)
GLUCOSE SERPL-MCNC: 131 MG/DL — HIGH (ref 70–99)
HCT VFR BLD CALC: 33.2 % — LOW (ref 34.5–45)
HGB BLD-MCNC: 10.6 G/DL — LOW (ref 11.5–15.5)
MCHC RBC-ENTMCNC: 28.8 PG — SIGNIFICANT CHANGE UP (ref 27–34)
MCHC RBC-ENTMCNC: 31.9 GM/DL — LOW (ref 32–36)
MCV RBC AUTO: 90.2 FL — SIGNIFICANT CHANGE UP (ref 80–100)
NRBC # BLD: 0 /100 WBCS — SIGNIFICANT CHANGE UP (ref 0–0)
PLATELET # BLD AUTO: 278 K/UL — SIGNIFICANT CHANGE UP (ref 150–400)
POTASSIUM SERPL-MCNC: 3.3 MMOL/L — LOW (ref 3.5–5.3)
POTASSIUM SERPL-SCNC: 3.3 MMOL/L — LOW (ref 3.5–5.3)
RBC # BLD: 3.68 M/UL — LOW (ref 3.8–5.2)
RBC # FLD: 13.8 % — SIGNIFICANT CHANGE UP (ref 10.3–14.5)
SODIUM SERPL-SCNC: 140 MMOL/L — SIGNIFICANT CHANGE UP (ref 135–145)
WBC # BLD: 10.08 K/UL — SIGNIFICANT CHANGE UP (ref 3.8–10.5)
WBC # FLD AUTO: 10.08 K/UL — SIGNIFICANT CHANGE UP (ref 3.8–10.5)

## 2024-05-14 PROCEDURE — 99232 SBSQ HOSP IP/OBS MODERATE 35: CPT

## 2024-05-14 PROCEDURE — 99233 SBSQ HOSP IP/OBS HIGH 50: CPT

## 2024-05-14 RX ORDER — HYDROMORPHONE HYDROCHLORIDE 2 MG/ML
1 INJECTION INTRAMUSCULAR; INTRAVENOUS; SUBCUTANEOUS EVERY 4 HOURS
Refills: 0 | Status: DISCONTINUED | OUTPATIENT
Start: 2024-05-14 | End: 2024-05-15

## 2024-05-14 RX ORDER — FLUTICASONE PROPIONATE 50 MCG
1 SPRAY, SUSPENSION NASAL
Refills: 0 | Status: DISCONTINUED | OUTPATIENT
Start: 2024-05-14 | End: 2024-05-18

## 2024-05-14 RX ORDER — POTASSIUM CHLORIDE 20 MEQ
20 PACKET (EA) ORAL
Refills: 0 | Status: COMPLETED | OUTPATIENT
Start: 2024-05-14 | End: 2024-05-14

## 2024-05-14 RX ADMIN — AMPICILLIN SODIUM AND SULBACTAM SODIUM 200 GRAM(S): 250; 125 INJECTION, POWDER, FOR SUSPENSION INTRAMUSCULAR; INTRAVENOUS at 23:12

## 2024-05-14 RX ADMIN — Medication 20 MILLIEQUIVALENT(S): at 08:07

## 2024-05-14 RX ADMIN — HYDROMORPHONE HYDROCHLORIDE 1 MILLIGRAM(S): 2 INJECTION INTRAMUSCULAR; INTRAVENOUS; SUBCUTANEOUS at 06:40

## 2024-05-14 RX ADMIN — HYDROMORPHONE HYDROCHLORIDE 1 MILLIGRAM(S): 2 INJECTION INTRAMUSCULAR; INTRAVENOUS; SUBCUTANEOUS at 00:17

## 2024-05-14 RX ADMIN — Medication 100 MILLIGRAM(S): at 13:38

## 2024-05-14 RX ADMIN — HYDROMORPHONE HYDROCHLORIDE 1 MILLIGRAM(S): 2 INJECTION INTRAMUSCULAR; INTRAVENOUS; SUBCUTANEOUS at 04:19

## 2024-05-14 RX ADMIN — AMPICILLIN SODIUM AND SULBACTAM SODIUM 200 GRAM(S): 250; 125 INJECTION, POWDER, FOR SUSPENSION INTRAMUSCULAR; INTRAVENOUS at 11:51

## 2024-05-14 RX ADMIN — PANTOPRAZOLE SODIUM 40 MILLIGRAM(S): 20 TABLET, DELAYED RELEASE ORAL at 05:30

## 2024-05-14 RX ADMIN — MONTELUKAST 10 MILLIGRAM(S): 4 TABLET, CHEWABLE ORAL at 21:48

## 2024-05-14 RX ADMIN — BUMETANIDE 2 MILLIGRAM(S): 0.25 INJECTION INTRAMUSCULAR; INTRAVENOUS at 06:18

## 2024-05-14 RX ADMIN — AMPICILLIN SODIUM AND SULBACTAM SODIUM 200 GRAM(S): 250; 125 INJECTION, POWDER, FOR SUSPENSION INTRAMUSCULAR; INTRAVENOUS at 00:36

## 2024-05-14 RX ADMIN — METHOCARBAMOL 750 MILLIGRAM(S): 500 TABLET, FILM COATED ORAL at 05:30

## 2024-05-14 RX ADMIN — LACTULOSE 20 GRAM(S): 10 SOLUTION ORAL at 21:49

## 2024-05-14 RX ADMIN — METHOCARBAMOL 750 MILLIGRAM(S): 500 TABLET, FILM COATED ORAL at 13:38

## 2024-05-14 RX ADMIN — INSULIN GLARGINE 54 UNIT(S): 100 INJECTION, SOLUTION SUBCUTANEOUS at 21:48

## 2024-05-14 RX ADMIN — Medication 1 SPRAY(S): at 17:34

## 2024-05-14 RX ADMIN — HYDROMORPHONE HYDROCHLORIDE 1 MILLIGRAM(S): 2 INJECTION INTRAMUSCULAR; INTRAVENOUS; SUBCUTANEOUS at 00:36

## 2024-05-14 RX ADMIN — SENNA PLUS 2 TABLET(S): 8.6 TABLET ORAL at 21:47

## 2024-05-14 RX ADMIN — HYDROMORPHONE HYDROCHLORIDE 1 MILLIGRAM(S): 2 INJECTION INTRAMUSCULAR; INTRAVENOUS; SUBCUTANEOUS at 22:28

## 2024-05-14 RX ADMIN — Medication 12 UNIT(S): at 11:50

## 2024-05-14 RX ADMIN — ATORVASTATIN CALCIUM 20 MILLIGRAM(S): 80 TABLET, FILM COATED ORAL at 21:48

## 2024-05-14 RX ADMIN — Medication 100 MILLIGRAM(S): at 21:48

## 2024-05-14 RX ADMIN — METHOCARBAMOL 750 MILLIGRAM(S): 500 TABLET, FILM COATED ORAL at 21:48

## 2024-05-14 RX ADMIN — ENOXAPARIN SODIUM 60 MILLIGRAM(S): 100 INJECTION SUBCUTANEOUS at 17:32

## 2024-05-14 RX ADMIN — Medication 12 UNIT(S): at 08:05

## 2024-05-14 RX ADMIN — CARVEDILOL PHOSPHATE 3.12 MILLIGRAM(S): 80 CAPSULE, EXTENDED RELEASE ORAL at 05:29

## 2024-05-14 RX ADMIN — HYDROMORPHONE HYDROCHLORIDE 1 MILLIGRAM(S): 2 INJECTION INTRAMUSCULAR; INTRAVENOUS; SUBCUTANEOUS at 15:00

## 2024-05-14 RX ADMIN — Medication 2: at 17:32

## 2024-05-14 RX ADMIN — HYDROMORPHONE HYDROCHLORIDE 1 MILLIGRAM(S): 2 INJECTION INTRAMUSCULAR; INTRAVENOUS; SUBCUTANEOUS at 09:36

## 2024-05-14 RX ADMIN — ENOXAPARIN SODIUM 60 MILLIGRAM(S): 100 INJECTION SUBCUTANEOUS at 05:30

## 2024-05-14 RX ADMIN — HYDROMORPHONE HYDROCHLORIDE 1 MILLIGRAM(S): 2 INJECTION INTRAMUSCULAR; INTRAVENOUS; SUBCUTANEOUS at 19:01

## 2024-05-14 RX ADMIN — LIDOCAINE 3 PATCH: 4 CREAM TOPICAL at 19:00

## 2024-05-14 RX ADMIN — AMPICILLIN SODIUM AND SULBACTAM SODIUM 200 GRAM(S): 250; 125 INJECTION, POWDER, FOR SUSPENSION INTRAMUSCULAR; INTRAVENOUS at 17:33

## 2024-05-14 RX ADMIN — HYDROMORPHONE HYDROCHLORIDE 1 MILLIGRAM(S): 2 INJECTION INTRAMUSCULAR; INTRAVENOUS; SUBCUTANEOUS at 10:55

## 2024-05-14 RX ADMIN — AMPICILLIN SODIUM AND SULBACTAM SODIUM 200 GRAM(S): 250; 125 INJECTION, POWDER, FOR SUSPENSION INTRAMUSCULAR; INTRAVENOUS at 05:30

## 2024-05-14 RX ADMIN — LIDOCAINE 3 PATCH: 4 CREAM TOPICAL at 11:51

## 2024-05-14 RX ADMIN — HYDROMORPHONE HYDROCHLORIDE 1 MILLIGRAM(S): 2 INJECTION INTRAMUSCULAR; INTRAVENOUS; SUBCUTANEOUS at 14:08

## 2024-05-14 RX ADMIN — LIDOCAINE 3 PATCH: 4 CREAM TOPICAL at 23:11

## 2024-05-14 RX ADMIN — HYDROMORPHONE HYDROCHLORIDE 1 MILLIGRAM(S): 2 INJECTION INTRAMUSCULAR; INTRAVENOUS; SUBCUTANEOUS at 23:05

## 2024-05-14 RX ADMIN — CARVEDILOL PHOSPHATE 3.12 MILLIGRAM(S): 80 CAPSULE, EXTENDED RELEASE ORAL at 17:33

## 2024-05-14 RX ADMIN — Medication 100 MILLIGRAM(S): at 05:29

## 2024-05-14 RX ADMIN — HYDROMORPHONE HYDROCHLORIDE 1 MILLIGRAM(S): 2 INJECTION INTRAMUSCULAR; INTRAVENOUS; SUBCUTANEOUS at 05:29

## 2024-05-14 RX ADMIN — Medication 20 MILLIEQUIVALENT(S): at 09:41

## 2024-05-14 RX ADMIN — HYDROMORPHONE HYDROCHLORIDE 1 MILLIGRAM(S): 2 INJECTION INTRAMUSCULAR; INTRAVENOUS; SUBCUTANEOUS at 18:24

## 2024-05-14 RX ADMIN — Medication 6 MILLIGRAM(S): at 21:48

## 2024-05-14 RX ADMIN — Medication 12 UNIT(S): at 17:32

## 2024-05-14 NOTE — PROGRESS NOTE ADULT - SUBJECTIVE AND OBJECTIVE BOX
NP Note discussed with  Primary Attending    Patient is a 54y old  Female who presents with a chief complaint of Complex Regional Pain syndrome flare (13 May 2024 11:42)      INTERVAL HPI/OVERNIGHT EVENTS: no new complaints    MEDICATIONS  (STANDING):  ampicillin/sulbactam  IVPB 3 Gram(s) IV Intermittent every 6 hours  ampicillin/sulbactam  IVPB      atorvastatin 20 milliGRAM(s) Oral at bedtime  buMETAnide 2 milliGRAM(s) Oral daily  carvedilol 3.125 milliGRAM(s) Oral every 12 hours  enoxaparin Injectable 60 milliGRAM(s) SubCutaneous every 12 hours  insulin glargine Injectable (LANTUS) 54 Unit(s) SubCutaneous at bedtime  insulin lispro (ADMELOG) corrective regimen sliding scale   SubCutaneous three times a day before meals  insulin lispro (ADMELOG) corrective regimen sliding scale   SubCutaneous at bedtime  insulin lispro Injectable (ADMELOG) 12 Unit(s) SubCutaneous three times a day before meals  lidocaine   4% Patch 3 Patch Transdermal daily  melatonin 6 milliGRAM(s) Oral at bedtime  methocarbamol 750 milliGRAM(s) Oral every 8 hours  montelukast 10 milliGRAM(s) Oral at bedtime  pantoprazole    Tablet 40 milliGRAM(s) Oral before breakfast  potassium chloride    Tablet ER 20 milliEquivalent(s) Oral every 2 hours  pregabalin 100 milliGRAM(s) Oral three times a day  senna 2 Tablet(s) Oral at bedtime    MEDICATIONS  (PRN):  acetaminophen     Tablet .. 650 milliGRAM(s) Oral every 6 hours PRN Temp greater or equal to 38C (100.4F), Mild Pain (1 - 3)  acetaminophen     Tablet .. 1000 milliGRAM(s) Oral every 8 hours PRN Moderate Pain (4 - 6)  aluminum hydroxide/magnesium hydroxide/simethicone Suspension 30 milliLiter(s) Oral every 4 hours PRN Dyspepsia  HYDROmorphone  Injectable 1 milliGRAM(s) IV Push every 4 hours PRN Pain  lactulose Syrup 20 Gram(s) Oral three times a day PRN Constipation  ondansetron Injectable 4 milliGRAM(s) IV Push every 8 hours PRN Nausea and/or Vomiting      __________________________________________________  REVIEW OF SYSTEMS:    CONSTITUTIONAL: No fever,   EYES: no acute visual disturbances  NECK: No pain or stiffness  RESPIRATORY: No cough; No shortness of breath  CARDIOVASCULAR: No chest pain, no palpitations  GASTROINTESTINAL: No pain. No nausea or vomiting; No diarrhea   NEUROLOGICAL: No headache or numbness, no tremors  MUSCULOSKELETAL: No joint pain, no muscle pain  GENITOURINARY: no dysuria, no frequency, no hesitancy  PSYCHIATRY: no depression , no anxiety  ALL OTHER  ROS negative        Vital Signs Last 24 Hrs  T(C): 36.6 (14 May 2024 04:45), Max: 38.6 (13 May 2024 14:37)  T(F): 97.8 (14 May 2024 04:45), Max: 101.4 (13 May 2024 14:37)  HR: 61 (14 May 2024 04:45) (61 - 80)  BP: 151/87 (14 May 2024 04:45) (151/87 - 155/66)  BP(mean): --  RR: 18 (14 May 2024 04:45) (18 - 18)  SpO2: 95% (14 May 2024 04:45) (95% - 99%)    Parameters below as of 14 May 2024 04:45  Patient On (Oxygen Delivery Method): room air        ________________________________________________  PHYSICAL EXAM:  GENERAL: NAD  HEENT: Normocephalic;  conjunctivae and sclerae clear; moist mucous membranes;   NECK : supple  CHEST/LUNG: Clear to auscultation bilaterally with good air entry   HEART: S1 S2  regular; no murmurs, gallops or rubs  ABDOMEN: Soft, Nontender, Nondistended; Bowel sounds present  EXTREMITIES: no cyanosis; no edema; no calf tenderness  SKIN: warm and dry; no rash  NERVOUS SYSTEM:  Awake and alert; Oriented  to place, person and time ; no new deficits    _________________________________________________  LABS:                        10.6   10.08 )-----------( 278      ( 14 May 2024 06:25 )             33.2     05-14    140  |  105  |  10  ----------------------------<  131<H>  3.3<L>   |  29  |  0.79    Ca    8.2<L>      14 May 2024 06:25  Mg     1.6     05-13        Urinalysis Basic - ( 14 May 2024 06:25 )    Color: x / Appearance: x / SG: x / pH: x  Gluc: 131 mg/dL / Ketone: x  / Bili: x / Urobili: x   Blood: x / Protein: x / Nitrite: x   Leuk Esterase: x / RBC: x / WBC x   Sq Epi: x / Non Sq Epi: x / Bacteria: x      CAPILLARY BLOOD GLUCOSE      POCT Blood Glucose.: 129 mg/dL (14 May 2024 07:47)  POCT Blood Glucose.: 160 mg/dL (13 May 2024 21:42)  POCT Blood Glucose.: 151 mg/dL (13 May 2024 16:29)  POCT Blood Glucose.: 173 mg/dL (13 May 2024 11:41)        RADIOLOGY & ADDITIONAL TESTS:    Imaging  Reviewed:  YES/NO    Consultant(s) Notes Reviewed:   YES/ No      Plan of care was discussed with patient and /or primary care giver; all questions and concerns were addressed  NP Note discussed with  Primary Attending    Patient is a 54y old  Female who presents with a chief complaint of Complex Regional Pain syndrome flare (13 May 2024 11:42)      INTERVAL HPI/OVERNIGHT EVENTS: no new complaints    MEDICATIONS  (STANDING):  ampicillin/sulbactam  IVPB 3 Gram(s) IV Intermittent every 6 hours  ampicillin/sulbactam  IVPB      atorvastatin 20 milliGRAM(s) Oral at bedtime  buMETAnide 2 milliGRAM(s) Oral daily  carvedilol 3.125 milliGRAM(s) Oral every 12 hours  enoxaparin Injectable 60 milliGRAM(s) SubCutaneous every 12 hours  insulin glargine Injectable (LANTUS) 54 Unit(s) SubCutaneous at bedtime  insulin lispro (ADMELOG) corrective regimen sliding scale   SubCutaneous three times a day before meals  insulin lispro (ADMELOG) corrective regimen sliding scale   SubCutaneous at bedtime  insulin lispro Injectable (ADMELOG) 12 Unit(s) SubCutaneous three times a day before meals  lidocaine   4% Patch 3 Patch Transdermal daily  melatonin 6 milliGRAM(s) Oral at bedtime  methocarbamol 750 milliGRAM(s) Oral every 8 hours  montelukast 10 milliGRAM(s) Oral at bedtime  pantoprazole    Tablet 40 milliGRAM(s) Oral before breakfast  potassium chloride    Tablet ER 20 milliEquivalent(s) Oral every 2 hours  pregabalin 100 milliGRAM(s) Oral three times a day  senna 2 Tablet(s) Oral at bedtime    MEDICATIONS  (PRN):  acetaminophen     Tablet .. 650 milliGRAM(s) Oral every 6 hours PRN Temp greater or equal to 38C (100.4F), Mild Pain (1 - 3)  acetaminophen     Tablet .. 1000 milliGRAM(s) Oral every 8 hours PRN Moderate Pain (4 - 6)  aluminum hydroxide/magnesium hydroxide/simethicone Suspension 30 milliLiter(s) Oral every 4 hours PRN Dyspepsia  HYDROmorphone  Injectable 1 milliGRAM(s) IV Push every 4 hours PRN Pain  lactulose Syrup 20 Gram(s) Oral three times a day PRN Constipation  ondansetron Injectable 4 milliGRAM(s) IV Push every 8 hours PRN Nausea and/or Vomiting      __________________________________________________  REVIEW OF SYSTEMS:    CONSTITUTIONAL: No fever,   EYES: no acute visual disturbances  NECK: No pain or stiffness  RESPIRATORY: No cough; No shortness of breath  CARDIOVASCULAR: No chest pain, no palpitations  GASTROINTESTINAL: No pain. No nausea or vomiting; No diarrhea   NEUROLOGICAL: No headache or numbness, no tremors  MUSCULOSKELETAL: + right flank pain; + right upper and lower back pain, chronic upper or lower motor strength weakness due to pain  GENITOURINARY: no dysuria, no frequency, no hesitancy  PSYCHIATRY: no depression , no anxiety  ALL OTHER  ROS negative        Vital Signs Last 24 Hrs  T(C): 36.6 (14 May 2024 04:45), Max: 38.6 (13 May 2024 14:37)  T(F): 97.8 (14 May 2024 04:45), Max: 101.4 (13 May 2024 14:37)  HR: 61 (14 May 2024 04:45) (61 - 80)  BP: 151/87 (14 May 2024 04:45) (151/87 - 155/66)  BP(mean): --  RR: 18 (14 May 2024 04:45) (18 - 18)  SpO2: 95% (14 May 2024 04:45) (95% - 99%)    Parameters below as of 14 May 2024 04:45  Patient On (Oxygen Delivery Method): room air        ________________________________________________  PHYSICAL EXAM:  GENERAL: NAD. sitting upright at side of bed   HEENT: Normocephalic;  conjunctivae and sclerae clear; moist mucous membranes; Right sided facial swelling noted   NECK : supple  CHEST/LUNG: Clear to auscultation bilaterally with good air entry   HEART: S1 S2  regular; no murmurs, gallops or rubs  ABDOMEN: Soft, Nontender, Nondistended; Bowel sounds present  EXTREMITIES: no cyanosis; no edema; no calf tenderness  SKIN: warm and dry; no rash  NERVOUS SYSTEM:  Awake and alert; Oriented  to place, person and time ; no new deficits    _________________________________________________  LABS:                        10.6   10.08 )-----------( 278      ( 14 May 2024 06:25 )             33.2     05-14    140  |  105  |  10  ----------------------------<  131<H>  3.3<L>   |  29  |  0.79    Ca    8.2<L>      14 May 2024 06:25  Mg     1.6     05-13        Urinalysis Basic - ( 14 May 2024 06:25 )    Color: x / Appearance: x / SG: x / pH: x  Gluc: 131 mg/dL / Ketone: x  / Bili: x / Urobili: x   Blood: x / Protein: x / Nitrite: x   Leuk Esterase: x / RBC: x / WBC x   Sq Epi: x / Non Sq Epi: x / Bacteria: x      CAPILLARY BLOOD GLUCOSE      POCT Blood Glucose.: 129 mg/dL (14 May 2024 07:47)  POCT Blood Glucose.: 160 mg/dL (13 May 2024 21:42)  POCT Blood Glucose.: 151 mg/dL (13 May 2024 16:29)  POCT Blood Glucose.: 173 mg/dL (13 May 2024 11:41)        RADIOLOGY & ADDITIONAL TESTS:  < from: CT Maxillofacial w/ IV Cont (05.08.24 @ 14:20) >    ACC: [id number] EXAM:  CT MAXILLOFACIAL  IC   ORDERED BY: BEN HANKS     PROCEDURE DATE:  05/08/2024          INTERPRETATION:  Clinical indication: Cheeks swelling.    Serial thin sections on a multi slice scanner were obtained through the   orbits and paranasal sinuses after contrast infusion with sagittal and   coronal computer-generated reconstructed views. 90 cc of Omnipaque 350   were intravenously injected, 10 cc were discarded.    Comparison is made with the prior maxillofacial CT of7/20/2023.    There is soft tissue swelling overlying the maxilla with inflammatory   changes in the fat. There is a air containing collection within this area   of soft tissue swelling. The collection measures 2.1 cm in AP diameter by   2.6 cm transversely by 2.8 cm in craniocaudal diameter. The air   collection measures 1.6 cm in AP diameter by 2 cm transversely by 2.8 cm   in craniocaudal diameter. This is consistent with an abscess with air   which may be iatrogenic.    There is no adjacent bone destruction. There is no adjacent periodontal   disease. There is a retention cyst or polyp in the right maxillary sinus.   The malar body and zygomatic arch is intact. The nasal septum is midline.   The left maxillary sinus and ethmoid sphenoid and frontal sinuses are   clear.    The visualized vascular structures are intact. The uvula is normal, the   epiglottis is normal. The visualized salivary glands are normal.    IMPRESSION: Soft tissue swelling with air containing collection overlying   the right malar body and the premaxillary space consistent with an   air-containing abscess which is new since 7/20/2023.    --- End of Report ---      ELMA SIDHU MD; Attending Radiologist  This document has been electronically signed. May8 2024  4:35PM    < end of copied text >    Imaging  Reviewed:  YES    Consultant(s) Notes Reviewed:   YES    Plan of care was discussed with patient; all questions and concerns were addressed

## 2024-05-14 NOTE — PROGRESS NOTE ADULT - PROBLEM SELECTOR PLAN 1
-Pt has a history of CRPS type 1, diagnosed 8 yrs ago. Used to follow with pain management however left due to complications with physician, has not be able to find another pain doctor since  -Presents with right flank pain, radiating in the back area.   -CT A/P no obstructive uropathy, but noted for right kidney stone, IUD, moderate stool, right flank spinal stimulator  -c/w dilauded IV prn for now   -bowel regimen: senna and miralax  Patient has called office of Dr. Hay, and is awaiting insurance verification to schedule appointment.   -Pain management following

## 2024-05-14 NOTE — PROGRESS NOTE ADULT - SUBJECTIVE AND OBJECTIVE BOX
Source of information: ROBIN LÓPEZ, Chart review  Patient language: English  : n/a    HPI:  This is a 53 y/o F, from home, lives alone, ambulates with cane/walker/wheelchair, with PMHx of DM, HTN, HLD, CHF, complex regional pain syndrome of right back intercostal muscles s/p spinal cord stimulator (Mar 2023) who presents with right flank pain. Pt states she is having a flare up of her complex regional pain syndrome that started 2 days ago. Pt mentions she ran out of her pain medications and has been taking up to six 800mg Ibuprofen daily. Pt does not follow with a pain specialist currently. Pt also states she had a recent lipoma resection from her right cheek (4/15/2024) after which she completed a course of antibiotics. Pt states she was having drainage and went to see her plastic surgeon on May 2nd, after which they placed a needle in the cheek and drained out fluid. Pt states that since that time she has not had any further fluid drain. Pt mentions she now has pain and slightly increased swelling in the cheek area. Pt mentions she had a 100.6 fever this morning and felt chills. Pt denies any recent travel, recent illness, dysuria, CP, SOB, N/V/D, or constipation. (08 May 2024 09:45)    Pt is admitted for intractable right flank pain. Pain service consulted on 5/8 for management of right flank pain. Per patient, she was diagnosed with complex regional pain syndrome and right sided intercostal neuralgia in 2016. Since her diagnosis she has trialed numerous medications/interventions without relief, including (amitriptyline and duloxetine which caused patti, max dose of gabapentin, and epidural spinal injections). She was recommended a spinal cord stimulator which was placed in March 2023, however she reports that it is not effective in managing her symptoms. She does not currently have an outpatient pain management provider and has been relying on her PCP to prescribe opioids to manage her pain. Patient also present with right facial swelling/droop since 4/24 after removal of right cheek mass. CT Maxillofacial demonstrates soft tissue swelling with air containing collection overlying the right malar body and the premaxillary space consistent with an air-containing abscess which is new since 7/20/2023.   Pt seen and examined this morning, while laying in bed. Pt found sitting up in bed, A&Ox4, grimacing in pain, reports pain score 7/10 to right flank and right low back. SCALE USED: (1-10 VNRS). Pt describes pain as constant, sharp, burning, and aching in quality, also reports intermittent muscle spasms. Patient states that "Dilaudid IV" is the only medication that works for her during a CRPS flare. The pain is mildly alleviated with current regimen and is exacerbated by movement and taking deep inhalations. At baseline patient reports pain level at 9/10. Pt tolerating PO diet. Denies lethargy, chest pain, SOB, nausea, vomiting. She reports history of constipation. Last BM 5/13. Patient stated goal for pain control: to be able to take deep breaths, get out of bed to chair and ambulate with tolerable pain control. Patient ambulates with rollator/straight cane at baseline and uses wheelchair for long distances. Per patient, her most recent pain regimen includes xanaflex 4mg TID, Lyrica 100mg TID, and oxycodone 20mg BID. Pt also reports being admitted to [facility] on 3/2024 with same symptoms and flare where she was treated with Dilaudid IV and Ketamine IV to control acute pain and then DC'd home.     PAST MEDICAL & SURGICAL HISTORY:  HTN (hypertension)    Neuropathy    Obesity    Former cigarette smoker  (smoked x 45 years; quit ~2013)    Marijuana smoker, continuous  (states smokes 3 - 4 times per day for pain management reasons)    Neuralgia  (pt states hx/o "intercostal neuralgia")    Diabetes    Essential hypertension    IBS (irritable bowel syndrome)    Complex regional pain syndrome type 1    DM2 (diabetes mellitus, type 2)    Intercostal neuralgia    Localized swelling, mass and lump, head    Asthma    History of CHF (congestive heart failure)    Fibroids    Idiopathic intracranial hypertension    History of cholecystectomy    History of hand surgery  (pt states she had 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12)    Spinal cord neurostimulator device in situ    History of removal of cyst    FAMILY HISTORY:  FH: HTN (hypertension)    Social History:  Former smoker, quit in 2014, up to 1 PPD for 35 years  denies EtOH use (08 May 2024 09:45)   [x]Denies ETOH use, illicit drug use, and smoking     Allergies    amitriptyline (Other)  duloxetine (Other)    Intolerances    MEDICATIONS  (STANDING):  ampicillin/sulbactam  IVPB      ampicillin/sulbactam  IVPB 3 Gram(s) IV Intermittent every 6 hours  atorvastatin 20 milliGRAM(s) Oral at bedtime  benzonatate 100 milliGRAM(s) Oral every 8 hours  buMETAnide 2 milliGRAM(s) Oral daily  carvedilol 3.125 milliGRAM(s) Oral every 12 hours  enoxaparin Injectable 60 milliGRAM(s) SubCutaneous every 12 hours  fluticasone propionate 50 MICROgram(s)/spray Nasal Spray 1 Spray(s) Both Nostrils two times a day  insulin glargine Injectable (LANTUS) 54 Unit(s) SubCutaneous at bedtime  insulin lispro (ADMELOG) corrective regimen sliding scale   SubCutaneous three times a day before meals  insulin lispro (ADMELOG) corrective regimen sliding scale   SubCutaneous at bedtime  insulin lispro Injectable (ADMELOG) 12 Unit(s) SubCutaneous three times a day before meals  lidocaine   4% Patch 3 Patch Transdermal daily  melatonin 6 milliGRAM(s) Oral at bedtime  methocarbamol 750 milliGRAM(s) Oral every 8 hours  montelukast 10 milliGRAM(s) Oral at bedtime  pantoprazole    Tablet 40 milliGRAM(s) Oral before breakfast  pregabalin 100 milliGRAM(s) Oral three times a day  senna 2 Tablet(s) Oral at bedtime    MEDICATIONS  (PRN):  acetaminophen     Tablet .. 650 milliGRAM(s) Oral every 6 hours PRN Temp greater or equal to 38C (100.4F), Mild Pain (1 - 3)  acetaminophen     Tablet .. 1000 milliGRAM(s) Oral every 8 hours PRN Moderate Pain (4 - 6)  aluminum hydroxide/magnesium hydroxide/simethicone Suspension 30 milliLiter(s) Oral every 4 hours PRN Dyspepsia  HYDROmorphone  Injectable 1 milliGRAM(s) IV Push every 4 hours PRN Pain  lactulose Syrup 20 Gram(s) Oral three times a day PRN Constipation  ondansetron Injectable 4 milliGRAM(s) IV Push every 8 hours PRN Nausea and/or Vomiting    Vital Signs Last 24 Hrs  T(C): 36.6 (14 May 2024 04:45), Max: 38.6 (13 May 2024 14:37)  T(F): 97.8 (14 May 2024 04:45), Max: 101.4 (13 May 2024 14:37)  HR: 61 (14 May 2024 04:45) (61 - 80)  BP: 151/87 (14 May 2024 04:45) (151/87 - 155/66)  BP(mean): --  RR: 18 (14 May 2024 04:45) (18 - 18)  SpO2: 95% (14 May 2024 04:45) (95% - 99%)    Parameters below as of 14 May 2024 04:45  Patient On (Oxygen Delivery Method): room air    SARS-CoV-2: NotDetec (25 Mar 2024 15:31)  SARS-CoV-2: NotDetec (08 Dec 2023 01:28)    LABS: Reviewed                          10.6   10.08 )-----------( 278      ( 14 May 2024 06:25 )             33.2     05-14    140  |  105  |  10  ----------------------------<  131<H>  3.3<L>   |  29  |  0.79    Ca    8.2<L>      14 May 2024 06:25  Mg     1.6     05-13    Urinalysis Basic - ( 14 May 2024 06:25 )    Color: x / Appearance: x / SG: x / pH: x  Gluc: 131 mg/dL / Ketone: x  / Bili: x / Urobili: x   Blood: x / Protein: x / Nitrite: x   Leuk Esterase: x / RBC: x / WBC x   Sq Epi: x / Non Sq Epi: x / Bacteria: x    CAPILLARY BLOOD GLUCOSE    POCT Blood Glucose.: 129 mg/dL (14 May 2024 07:47)  POCT Blood Glucose.: 160 mg/dL (13 May 2024 21:42)  POCT Blood Glucose.: 151 mg/dL (13 May 2024 16:29)  POCT Blood Glucose.: 173 mg/dL (13 May 2024 11:41)    SARS-CoV-2: NotDetec (25 Mar 2024 15:31)  SARS-CoV-2: NotDetec (08 Dec 2023 01:28)    Radiology: Reviewed    ACC: [id number] EXAM:  CT MAXILLOFACIAL  IC   ORDERED BY: BEN HANKS     PROCEDURE DATE:  05/08/2024      INTERPRETATION:  Clinical indication: Cheeks swelling.    Serial thin sections on a multi slice scanner were obtained through the   orbits and paranasal sinuses after contrast infusion with sagittal and   coronal computer-generated reconstructed views. 90 cc of Omnipaque 350   were intravenously injected, 10 cc were discarded.    Comparison is made with the prior maxillofacial CT of7/20/2023.    There is soft tissue swelling overlying the maxilla with inflammatory   changes in the fat. There is a air containing collection within this area   of soft tissue swelling. The collection measures 2.1 cm in AP diameter by   2.6 cm transversely by 2.8 cm in craniocaudal diameter. The air   collection measures 1.6 cm in AP diameter by 2 cm transversely by 2.8 cm   in craniocaudal diameter. This is consistent with an abscess with air   which may be iatrogenic.    There is no adjacent bone destruction. There is no adjacent periodontal   disease. There is a retention cyst or polyp in the right maxillary sinus.   The malar body and zygomatic arch is intact. The nasal septum is midline.   The left maxillary sinus and ethmoid sphenoid and frontal sinuses are   clear.    The visualized vascular structures are intact. The uvula is normal, the   epiglottis is normal. The visualized salivary glands are normal.    IMPRESSION: Soft tissue swelling with air containing collection overlying   the right malar body and the premaxillary space consistent with an   air-containing abscess which is new since 7/20/2023.    --- End of Report ---    ELMA SIDHU MD; Attending Radiologist  This document has been electronically signed. May8 2024  4:35PM  ACC: [id number] EXAM:  CT ABDOMEN AND PELVIS   ORDERED BY: TRACE ROYAL     PROCEDURE DATE:  05/08/2024      INTERPRETATION:  CLINICAL INFORMATION: Right flank pain    COMPARISON: CT abdomen pelvis 1/22/2023    CONTRAST/COMPLICATIONS:  IV Contrast: None  Oral Contrast: None  Complications: None    PROCEDURE:  CT of the Abdomen and Pelvis was performed.  Sagittal and coronal reformats were performed.    FINDINGS:  LOWER CHEST: Aortic root, coronary artery, mitral annular calcifications.   Linear subsegmental atelectasis in the lingula.    LIVER: Within normal limits.  BILE DUCTS: Normal caliber.  GALLBLADDER: Cholecystectomy.  SPLEEN: Within normal limits.  PANCREAS: Within normal limits.  ADRENALS: Within normal limits.  KIDNEYS/URETERS: Punctate nonobstructing stone in the interpolar region   of the right kidney. No hydronephrosis or perinephric inflammation.    BLADDER: Within normal limits.  REPRODUCTIVE ORGANS: Intrauterine device. No gross adnexal masses.    BOWEL: Duodenal diverticulum. No bowel obstruction. Appendix is normal.   Moderate stool in the rectosigmoid colon. No bowel wall thickening or   pericolonic inflammatory change.  PERITONEUM: No ascites.  VESSELS: Mild atherosclerotic changes.  RETROPERITONEUM/LYMPH NODES: No lymphadenopathy.  ABDOMINAL WALL: Small fat-containing umbilical hernia. Spinal stimulator   generator in the right flank subcutaneous soft tissues. Mild diffuse   anasarca.  BONES: Degenerative changes. Spinal stimulator electrodes.    IMPRESSION:  No evidence for obstructive uropathy.    --- End of Report ---    MILE COSTELLO MD; Attending Radiologist  This document has been electronically signed. May  8 2024  6:54AM  ACC: [id number] EXAM:  XR CHEST PA LAT 2V   ORDERED BY: TRACE ROYAL     PROCEDURE DATE:  05/08/2024      INTERPRETATION:  EXAM: XR CHEST PA AND LATERAL    INDICATION: R flan kpain HXR    COMPARISON: March 23, 2024    IMPRESSION: Some linear scarring and/or platelike atelectatic change in   the left and right chest. Some slight prominence to interstitial markings   in the right infrahilar region which could represent an inflammatory   infectious process and/or atelectatic change suggested on the frontal   view. Heart is within normal limits in its transthoracic diameter.   Regional osseous structures appropriate for age    --- End of Report ---    MARK BECK MD; Attending Radiologist  This document has been electronically signed. May  8 2024  9:00AM    ORT Score -   Family Hx of substance abuse	Female	      Male  Alcohol 	                                           1                     3  Illegal drugs	                                   2                     3  Rx drugs                                           4 	                  4  Personal Hx of substance abuse		  Alcohol 	                                          3	                  3  Illegal drugs                                     4	                  4  Rx drugs                                            5 	                  5  Age between 16- 45 years	           1                     1  hx preadolescent sexual abuse	   3 	                  0  Psychological disease		  ADD, OCD, bipolar, schizophrenia   2	          2  Depression                                           1 	          1  Total: 0    a score of 3 or lower indicates low risk for opioid abuse		  a score of 4-7 indicates moderate risk for opioid abuse		  a score of 8 or higher indicates high risk for opioid abuse    REVIEW OF SYSTEMS:  CONSTITUTIONAL: No fever or fatigue  HEENT:  No difficulty hearing, no change in vision  NECK: No pain or stiffness  RESPIRATORY: No cough, wheezing, chills or hemoptysis; No shortness of breath  CARDIOVASCULAR: No chest pain, palpitations, dizziness, or leg swelling  GASTROINTESTINAL: No loss of appetite, decreased PO intake. No abdominal or epigastric pain. No nausea, vomiting; No diarrhea or constipation.   GENITOURINARY: No dysuria, frequency, hematuria, retention or incontinence  MUSCULOSKELETAL: + right flank pain; + right upper and lower back pain, chronic upper or lower motor strength weakness due to pain, no saddle anesthesia, bowel/bladder incontinence, no falls   NEURO: No headaches, + numbness/tingling b/l LE, No weakness  ENDOCRINE: No polyuria, polydipsia, heat or cold intolerance; No hair loss  PSYCHIATRIC: No depression, anxiety or difficulty sleeping    PHYSICAL EXAM:  GENERAL:  Alert & Oriented X4, cooperative, NAD, Good concentration. Speech is clear, (right facial droop, since 4/2024 after surgical removal of right cheek mass.)  RESPIRATORY: Respirations even and unlabored. Clear to auscultation bilaterally  CARDIOVASCULAR: Normal S1/S2, regular rate and rhythm  GASTROINTESTINAL:  Soft, obese per BMI, right flank tenderness, bowel sounds present  PERIPHERAL VASCULAR: Extremities warm without edema. 2+ Peripheral Pulses, No cyanosis, No calf tenderness  MUSCULOSKELETAL: Motor Strength 4/5 B/L upper and lower extremities; moves all extremities equally against gravity; + right upper and lower back tenderness  SKIN: Warm, dry, intact, spinal cord stimulator battery palpable to right low back, mid back scar from spinal cord stimulator insertion    Risk factors associated with adverse outcomes related to opioid treatment  [ ]  Concurrent benzodiazepine use  [ ]  History/ Active substance use or alcohol use disorder  [ ] Psychiatric co-morbidity  [ ] Sleep apnea  [ ] COPD  [x] BMI> 35  [ ] Liver dysfunction  [ ] Renal dysfunction  [ ] CHF  [x] Former smoker  [ ]  Age > 60 years    [x]  [place]  Reviewed and Copied to Chart. See below.    Plan of care and goal oriented pain management treatment options were discussed with patient and /or primary care giver; all questions and concerns were addressed and care was aligned with patient's wishes.    Educated patient on goal oriented pain management treatment options

## 2024-05-14 NOTE — PROGRESS NOTE ADULT - PROBLEM SELECTOR PLAN 5
[de-identified] : 12 year old boy with asthma, allergic rhinitis, and atopic dermatitis who comes in for follow up today. Bay is on immunotherapy for allergic rhinitis. Last time he used albuterol was 3 weeks ago and was related to doing football practice. Has had symptoms of itchy throat, red eyes, cough, runny nose more-so in the last year since last visit. Mom attributes it to being in in-person school, current change of weather and increased sports participation. Uses Asmanex 2 puff twice a day (no spacer) and daily Singulair regularly as prescribed as the asthma maintenance medication. He mentions he is compliant. (AS PER PATIENT THIS IS HIS REGIMEN, however, grandmother is the one that often supervises him during the week.) Per last visit's note, he was supposed to be on Asmanex 1 puff twice a day with a spacer.\par \par ASTHMA MEDS:\par Montelukast qD\par Albuterol PRN\par Azelastine nasal spray PRN\par Asmanex 2 puff twice a day\par \par Recent Exacerbation History: 0 visits to the emergency room in the past year, 0 visits to the ICU in the past year, hospitalized 0 times in the past year and intubated 0 times in the past year. \par Current Asthma Symptoms: cough, but no shortness of breath, no dyspnea on exertion and no wheezing. \par Short Acting Bronchodilator Use: < than or = once every so often (3 weeks prior) Before activities suggested by [facility] doctor.\par Nocturnal Awakeninx/month \par Interference with Normal Activity: Coughs often and may have exercise-induced asthma\par Asthma Symptoms: < than or = 11-18 days per month\par Excerbation requiring Oral Corticosteroids: 0 - 1/year \par \par ACT Questionnaire Score: 14 (see in scanned document)\par \par FOOD Concerns:\par Food avoidance: avoids shrimp because mom has shrimp allergy. Will try at home soon.\par \par ECZEMA:\par Last flare a few months ago. Grandma took him to derm: steroid cream (last time was used was yesterday) and it taking it once every 2 weeks. Elbow + creases rash improved, but hand rash still there intermittently. Uses Eucerin twice a day after showers.\par \par No history or symptoms of venom reactions, food allergies.  K 3.3   add potassium 40 meq x1 dose  monitor BMP  -replete as needed

## 2024-05-14 NOTE — PROGRESS NOTE ADULT - ASSESSMENT
Search Terms: Fauzia Linares, 1970Search Date: 05/08/2024 09:30:53 AM  The Drug Utilization Report below displays all of the controlled substance prescriptions, if any, that your patient has filled in the last twelve months. The information displayed on this report is compiled from pharmacy submissions to the Department, and accurately reflects the information as submitted by the pharmacies.    This report was requested by: Danni Silva | Reference #: [id number]    Practitioner Count: 5  Pharmacy Count: 2  Current Opioid Prescriptions: 1  Current Benzodiazepine Prescriptions: 0  Current Stimulant Prescriptions: 0      Patient Demographic Information (PDI)       PDI	First Name	Last Name	Birth Date	Gender	Street Address	[facility]	Zip Code  A	Fauzia Linares	1970	Female	[address]	[place]	[address]  B	Fauzia Linares	1970	Female	[address]	[place]	[address]  C	Fauzia Linares	1970	Female	[address]	[facility]	[place]	[address]  D	Fauzia Linares	1970	Female	[address]	[facility]	[place]	[address]  E	Fauzia Linares	1970	Female	[address]	[facility]	[place]	[address]  F	Fauzia Linares	1970	Female	[address]	[facility]	[place]	[address]  G	Fauzia Linares	1970	Female	[address]	[place]	[address]    Prescription Information      PDI Filter:    PDI	My Rx	Current Rx	Drug Type	Rx Written	Rx Dispensed	Drug	Quantity	Days Supply	Prescriber Name	Prescriber CROW #	Payment Method	Dispenser  A	N	N	O	03/26/2024	03/26/2024	oxycodone hcl (ir) 5 mg tablet	30	5	Quinones, Elizabeth MERLOS	[id number]	[facility]	N	N	O	03/26/2024	03/26/2024	oxycodone hcl (ir) 10 mg tab	30	5	Quinones, Elizabeth MERLOS	[id number]	[facility]  A	N	N	O	03/26/2024	03/26/2024	oxycontin er 10 mg tablet	10	5	Quinones, Elizabeth MERLOS	[id number]	[facility]  B	N	N		10/06/2023	10/07/2023	pregabalin 100 mg capsule	90	30	Tc, Reba	[id number]	Medicare	[facility]  B	N	N	O	09/28/2023	10/01/2023	oxycodone hcl (ir) 15 mg tab	30	5	Tc, Reba	[id number]	Medicare	[facility]  B	N	N	O	09/27/2023	09/27/2023	oxycodone hcl (ir) 15 mg tab	30	8	Tc, Reba	[id number]	Medicare	[facility]  B	N	N	O	09/10/2023	09/10/2023	oxycodone hcl (ir) 15 mg tab	30	5	Tc, Reba	[id number]	Medicare	[facility]  B	N	N		09/06/2023	09/06/2023	pregabalin 100 mg capsule	90	30	Tc, Reba	[id number]	Medicare	[facility]  B	N	N	O	08/27/2023	08/28/2023	oxycodone hcl (ir) 15 mg tab	30	5	Tc, Reba	[id number]	Medicare	[facility]  B	N	N	O	08/12/2023	08/13/2023	oxycodone hcl (ir) 15 mg tab	30	5	Tc, Reba	[id number]	Medicare	[facility]  B	N	N		08/04/2023	08/05/2023	pregabalin 100 mg capsule	90	30	Tc, Reba	[id number]	Medicare	[facility]  B	N	N		07/21/2023	07/22/2023	pregabalin 100 mg capsule	42	14	Tc, Reba	[id number]	Medicare	[facility]  B	N	N	O	07/16/2023	07/16/2023	oxycodone hcl (ir) 15 mg tab	30	7	Tc, Reba	[id number]	Medicare	[facility]  B	N	N	O	06/30/2023	07/01/2023	oxycodone hcl (ir) 15 mg tab	42	10	Tc, Reba	[id number]	Medicare	[facility]  B	N	N		06/19/2023	06/20/2023	pregabalin 100 mg capsule	90	30	Tc, Reba	[id number]	Medicare	[facility]  B	N	N	O	06/06/2023	06/07/2023	oxycodone hcl (ir) 15 mg tab	42	7	Tc, Reba	[id number]	Medicare	[facility]  B	N	N	O	05/22/2023	05/23/2023	oxycodone hcl (ir) 15 mg tab	42	10	Tc, Reba	[id number]	Medicare	[facility]  B	N	N		05/19/2023	05/20/2023	pregabalin 100 mg capsule	90	30	Tc, Reba	[id number]	Medicare	[facility]  C	N	N	O	11/28/2023	11/30/2023	oxycodone hcl (ir) 20 mg tab	60	30	Vinny Maldonado MD	[id number]	Insurance	[id number]  C	N	N	O	11/16/2023	11/18/2023	oxycodone-acetaminophen  mg tab	30	15	Vinny Maldonado MD	[id number]	Insurance	[id number]  C	N	N		11/16/2023	11/18/2023	pregabalin 100 mg capsule	90	30	Vinny Maldonado MD	[id number]	Insurance	[id number]  C	N	N		09/28/2023	11/01/2023	pregabalin 100 mg capsule	30	10	Tc, Reba	[id number]	Insurance	[id number]  D	N	N	O	12/29/2023	12/29/2023	oxycodone hcl (ir) 15 mg tab	12	3	Christopher Montiel	[id number]	Insurance	[facility]  D	N	N		12/29/2023	12/29/2023	butalbital-acetaminophen-caffeine -40 mg tablet	53	8	Christopher Montiel	[id number]	Insurance	[facility]	N	N	O	10/12/2023	10/14/2023	oxycodone hcl (ir) 15 mg tab	28	7	Naomi Ybarra	[id number]	Insurance	[id number]  E	N	N		02/08/2024	02/09/2024	pregabalin 100 mg capsule	42	7	Alba Jasso M	[id number]	Insurance	[facility]  E	N	N	O	02/08/2024	02/09/2024	hydromorphone 4 mg tablet	20	5	Alba Jasso M	[id number]	[facility]  E	N	N		01/25/2024	01/26/2024	pregabalin 100 mg capsule	90	30	Vinny Maldonado MD	[id number]	[facility]	N	N	O	01/25/2024	01/26/2024	oxycodone hcl (ir) 20 mg tab	60	30	Vinny Maldonado MD	[id number]	[facility]	N	Y		04/03/2024	05/06/2024	pregabalin 100 mg capsule	90	30	Vinny Maldonado MD	[id number]	[facility]	N	Y	O	04/30/2024	05/02/2024	oxycodone hcl (ir) 20 mg tab	60	30	Vinny Maldonado MD	[id number]	[facility]	N	N	O	04/16/2024	04/17/2024	oxycodone hcl (ir) 5 mg tablet	10	3	Frank Ba	[id number]	[facility]	N	N		04/03/2024	04/04/2024	pregabalin 100 mg capsule	90	30	Vinny Maldonado MD	[id number]	[facility]	N	N	O	03/04/2024	03/05/2024	hydromorphone 4 mg tablet	20	5	Bonny Bryant	[id number]	[facility]  G	N	N		09/28/2023	10/02/2023	pregabalin 100 mg capsule	30	10	Reba Montez	[id number]	[id number]    * - Details of Drug Type : O = Opioid, B = Benzodiazepine, S = Stimulant    * - Drugs marked with an asterisk are compound drugs. If the compound drug is made up of more than one controlled substance, then each controlled substance will be a separate row in the table.

## 2024-05-14 NOTE — PROGRESS NOTE ADULT - PROBLEM SELECTOR PLAN 3
-s/p lipoma resection 4/15 and [facility]. Checked up on last week, had needle placed in cheek, no further drainage  mild fever at home, now with slightly increased swelling  -c/w Unasyn  -add viscous lidocaine q4h PRN  -send blood culture if patient is febrile again   -trend esr and crp  -ID Dr. Short following   -f/u outpatient with Plastic, outpatient plastic NP: Bebeto Hay [phone]

## 2024-05-14 NOTE — PROGRESS NOTE ADULT - ASSESSMENT
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 Admitted for intractable pain management and right facial swelling. Pt is s/p lipoma resection 4/15 [facility]. Pt had drainage and had needle placed in cheek by outpatient plastic surgeon, no further drainage was noted by plastic surgeon.   CT abdomen/Pelvis showed no obstructive uropathy, but noted for right kidney stone, IUD, moderate stool, right flank spinal stimulator. CT maxillofacial showed  soft tissue swelling with air containing collection overlying the right malar body and the premaxillary space consistent with an air-containing abscess which is new since 7/20/2023. Patient treated with Unasyn. Patient admitted to medicine for complex regional pain syndrome Type 1. Patient pain not improving, will continue with hydromorphone IV as needed,  pain management following. Pain management recommending outpatient follow up. Patient can follow up with Dr. Hay- [phone] (pain), upon discharge from hospital.   5/14: patient noted with 1 episode of fever on 5/13,no further episodes noted. c/w abx, Unasyn for now and monitor.  denies any worsening pain/swelling/erythema to face.

## 2024-05-14 NOTE — PROGRESS NOTE ADULT - PROBLEM SELECTOR PLAN 1
Pt with chronic right flank pain which is somatic and neuropathic in nature due to history of complex regional pain syndrome and right intercostal neuralgia. Since diagnosis pt has tried numerous medications/interventions without relief, including (amitriptyline and duloxetine which caused patti, max dose of gabapentin, and epidural spinal injections). She was recommended a spinal cord stimulator which was placed in 3/23, however she reports that it is not effective in managing her symptoms. She does not currently have an outpatient pain management provider and has been relying on her PCP to prescribe opioids to manage her pain.   Opioid pain recommendations   - Recommend discontinuing Dilaudid 1 mg IVP q4h PRN for severe pain. (In preparation for discharge home patient needs PO regimen )   - Start oxycodone 20mg PO q 8hrs PRN for severe pain. (patient's most recent home dose, verified on iSTOP) Monitor for respiratory depression/sedation.  Non-opioid pain recommendations   - Continue Robaxin 750 mg po q8h x 5 days. Monitor drowsiness.  - Completed Acetaminophen 1 gram PO q 8 hours x 3 days. Start 1g q 8hrs PRN for moderate pain.   - Continue Lyrica 100mg q 8hrs. (home med)  - Continue Lidoderm 4% patch daily. x 3 (12 hrs on/12 hrs off)  Bowel Regimen  - Continue Miralax 17G PO daily   - Continue Senna 2 tablets at bedtime for constipation  Mild pain (score 1-3)  - Non-pharmacological pain treatment recommendations  - Warm/ Cool packs PRN   - Repositioning extremity, elevation, imagery, relaxation, distraction.  - Physical therapy OOB if no contraindications   Recommendations discussed with primary team and RN.    *** Patient can follow up with Dr. Hay- [phone], upon discharge from hospital ***. Patient has called office and is awaiting insurance verification to schedule appointment.

## 2024-05-15 LAB
ANION GAP SERPL CALC-SCNC: 6 MMOL/L — SIGNIFICANT CHANGE UP (ref 5–17)
BUN SERPL-MCNC: 12 MG/DL — SIGNIFICANT CHANGE UP (ref 7–18)
CALCIUM SERPL-MCNC: 8.4 MG/DL — SIGNIFICANT CHANGE UP (ref 8.4–10.5)
CHLORIDE SERPL-SCNC: 105 MMOL/L — SIGNIFICANT CHANGE UP (ref 96–108)
CO2 SERPL-SCNC: 29 MMOL/L — SIGNIFICANT CHANGE UP (ref 22–31)
CREAT SERPL-MCNC: 0.91 MG/DL — SIGNIFICANT CHANGE UP (ref 0.5–1.3)
EGFR: 75 ML/MIN/1.73M2 — SIGNIFICANT CHANGE UP
GLUCOSE BLDC GLUCOMTR-MCNC: 230 MG/DL — HIGH (ref 70–99)
GLUCOSE BLDC GLUCOMTR-MCNC: 232 MG/DL — HIGH (ref 70–99)
GLUCOSE BLDC GLUCOMTR-MCNC: 268 MG/DL — HIGH (ref 70–99)
GLUCOSE BLDC GLUCOMTR-MCNC: 278 MG/DL — HIGH (ref 70–99)
GLUCOSE SERPL-MCNC: 250 MG/DL — HIGH (ref 70–99)
HCT VFR BLD CALC: 34.5 % — SIGNIFICANT CHANGE UP (ref 34.5–45)
HGB BLD-MCNC: 11 G/DL — LOW (ref 11.5–15.5)
MCHC RBC-ENTMCNC: 28.9 PG — SIGNIFICANT CHANGE UP (ref 27–34)
MCHC RBC-ENTMCNC: 31.9 GM/DL — LOW (ref 32–36)
MCV RBC AUTO: 90.6 FL — SIGNIFICANT CHANGE UP (ref 80–100)
NRBC # BLD: 0 /100 WBCS — SIGNIFICANT CHANGE UP (ref 0–0)
PLATELET # BLD AUTO: 307 K/UL — SIGNIFICANT CHANGE UP (ref 150–400)
POTASSIUM SERPL-MCNC: 3.6 MMOL/L — SIGNIFICANT CHANGE UP (ref 3.5–5.3)
POTASSIUM SERPL-SCNC: 3.6 MMOL/L — SIGNIFICANT CHANGE UP (ref 3.5–5.3)
RBC # BLD: 3.81 M/UL — SIGNIFICANT CHANGE UP (ref 3.8–5.2)
RBC # FLD: 14 % — SIGNIFICANT CHANGE UP (ref 10.3–14.5)
SODIUM SERPL-SCNC: 140 MMOL/L — SIGNIFICANT CHANGE UP (ref 135–145)
WBC # BLD: 8.62 K/UL — SIGNIFICANT CHANGE UP (ref 3.8–10.5)
WBC # FLD AUTO: 8.62 K/UL — SIGNIFICANT CHANGE UP (ref 3.8–10.5)

## 2024-05-15 PROCEDURE — 99233 SBSQ HOSP IP/OBS HIGH 50: CPT

## 2024-05-15 PROCEDURE — 99232 SBSQ HOSP IP/OBS MODERATE 35: CPT

## 2024-05-15 RX ORDER — HYDROMORPHONE HYDROCHLORIDE 2 MG/ML
1 INJECTION INTRAMUSCULAR; INTRAVENOUS; SUBCUTANEOUS EVERY 8 HOURS
Refills: 0 | Status: DISCONTINUED | OUTPATIENT
Start: 2024-05-15 | End: 2024-05-16

## 2024-05-15 RX ORDER — ALBUTEROL 90 UG/1
2 AEROSOL, METERED ORAL EVERY 6 HOURS
Refills: 0 | Status: DISCONTINUED | OUTPATIENT
Start: 2024-05-15 | End: 2024-05-18

## 2024-05-15 RX ORDER — OXYCODONE HYDROCHLORIDE 5 MG/1
20 TABLET ORAL EVERY 8 HOURS
Refills: 0 | Status: DISCONTINUED | OUTPATIENT
Start: 2024-05-15 | End: 2024-05-16

## 2024-05-15 RX ADMIN — OXYCODONE HYDROCHLORIDE 20 MILLIGRAM(S): 5 TABLET ORAL at 13:44

## 2024-05-15 RX ADMIN — METHOCARBAMOL 750 MILLIGRAM(S): 500 TABLET, FILM COATED ORAL at 05:04

## 2024-05-15 RX ADMIN — Medication 12 UNIT(S): at 17:05

## 2024-05-15 RX ADMIN — HYDROMORPHONE HYDROCHLORIDE 1 MILLIGRAM(S): 2 INJECTION INTRAMUSCULAR; INTRAVENOUS; SUBCUTANEOUS at 09:25

## 2024-05-15 RX ADMIN — Medication 12 UNIT(S): at 08:17

## 2024-05-15 RX ADMIN — Medication 100 MILLIGRAM(S): at 13:46

## 2024-05-15 RX ADMIN — Medication 6: at 17:04

## 2024-05-15 RX ADMIN — OXYCODONE HYDROCHLORIDE 20 MILLIGRAM(S): 5 TABLET ORAL at 21:46

## 2024-05-15 RX ADMIN — Medication 2: at 21:45

## 2024-05-15 RX ADMIN — ENOXAPARIN SODIUM 60 MILLIGRAM(S): 100 INJECTION SUBCUTANEOUS at 05:03

## 2024-05-15 RX ADMIN — AMPICILLIN SODIUM AND SULBACTAM SODIUM 200 GRAM(S): 250; 125 INJECTION, POWDER, FOR SUSPENSION INTRAMUSCULAR; INTRAVENOUS at 12:13

## 2024-05-15 RX ADMIN — Medication 1 SPRAY(S): at 17:05

## 2024-05-15 RX ADMIN — ALBUTEROL 2 PUFF(S): 90 AEROSOL, METERED ORAL at 17:03

## 2024-05-15 RX ADMIN — METHOCARBAMOL 750 MILLIGRAM(S): 500 TABLET, FILM COATED ORAL at 21:49

## 2024-05-15 RX ADMIN — Medication 1 SPRAY(S): at 05:04

## 2024-05-15 RX ADMIN — HYDROMORPHONE HYDROCHLORIDE 1 MILLIGRAM(S): 2 INJECTION INTRAMUSCULAR; INTRAVENOUS; SUBCUTANEOUS at 09:55

## 2024-05-15 RX ADMIN — Medication 100 MILLIGRAM(S): at 05:03

## 2024-05-15 RX ADMIN — OXYCODONE HYDROCHLORIDE 20 MILLIGRAM(S): 5 TABLET ORAL at 12:41

## 2024-05-15 RX ADMIN — ATORVASTATIN CALCIUM 20 MILLIGRAM(S): 80 TABLET, FILM COATED ORAL at 21:47

## 2024-05-15 RX ADMIN — BUMETANIDE 2 MILLIGRAM(S): 0.25 INJECTION INTRAMUSCULAR; INTRAVENOUS at 05:03

## 2024-05-15 RX ADMIN — HYDROMORPHONE HYDROCHLORIDE 1 MILLIGRAM(S): 2 INJECTION INTRAMUSCULAR; INTRAVENOUS; SUBCUTANEOUS at 05:03

## 2024-05-15 RX ADMIN — SENNA PLUS 2 TABLET(S): 8.6 TABLET ORAL at 21:47

## 2024-05-15 RX ADMIN — Medication 6 MILLIGRAM(S): at 21:47

## 2024-05-15 RX ADMIN — LIDOCAINE 3 PATCH: 4 CREAM TOPICAL at 12:13

## 2024-05-15 RX ADMIN — HYDROMORPHONE HYDROCHLORIDE 1 MILLIGRAM(S): 2 INJECTION INTRAMUSCULAR; INTRAVENOUS; SUBCUTANEOUS at 18:00

## 2024-05-15 RX ADMIN — AMPICILLIN SODIUM AND SULBACTAM SODIUM 200 GRAM(S): 250; 125 INJECTION, POWDER, FOR SUSPENSION INTRAMUSCULAR; INTRAVENOUS at 17:06

## 2024-05-15 RX ADMIN — LIDOCAINE 3 PATCH: 4 CREAM TOPICAL at 23:43

## 2024-05-15 RX ADMIN — Medication 4: at 08:17

## 2024-05-15 RX ADMIN — METHOCARBAMOL 750 MILLIGRAM(S): 500 TABLET, FILM COATED ORAL at 13:46

## 2024-05-15 RX ADMIN — AMPICILLIN SODIUM AND SULBACTAM SODIUM 200 GRAM(S): 250; 125 INJECTION, POWDER, FOR SUSPENSION INTRAMUSCULAR; INTRAVENOUS at 05:01

## 2024-05-15 RX ADMIN — ENOXAPARIN SODIUM 60 MILLIGRAM(S): 100 INJECTION SUBCUTANEOUS at 17:06

## 2024-05-15 RX ADMIN — Medication 100 MILLIGRAM(S): at 05:04

## 2024-05-15 RX ADMIN — HYDROMORPHONE HYDROCHLORIDE 1 MILLIGRAM(S): 2 INJECTION INTRAMUSCULAR; INTRAVENOUS; SUBCUTANEOUS at 16:35

## 2024-05-15 RX ADMIN — HYDROMORPHONE HYDROCHLORIDE 1 MILLIGRAM(S): 2 INJECTION INTRAMUSCULAR; INTRAVENOUS; SUBCUTANEOUS at 06:28

## 2024-05-15 RX ADMIN — MONTELUKAST 10 MILLIGRAM(S): 4 TABLET, CHEWABLE ORAL at 21:48

## 2024-05-15 RX ADMIN — LIDOCAINE 3 PATCH: 4 CREAM TOPICAL at 18:39

## 2024-05-15 RX ADMIN — CARVEDILOL PHOSPHATE 3.12 MILLIGRAM(S): 80 CAPSULE, EXTENDED RELEASE ORAL at 17:05

## 2024-05-15 RX ADMIN — AMPICILLIN SODIUM AND SULBACTAM SODIUM 200 GRAM(S): 250; 125 INJECTION, POWDER, FOR SUSPENSION INTRAMUSCULAR; INTRAVENOUS at 23:36

## 2024-05-15 RX ADMIN — INSULIN GLARGINE 54 UNIT(S): 100 INJECTION, SOLUTION SUBCUTANEOUS at 21:46

## 2024-05-15 RX ADMIN — Medication 4: at 12:16

## 2024-05-15 RX ADMIN — CARVEDILOL PHOSPHATE 3.12 MILLIGRAM(S): 80 CAPSULE, EXTENDED RELEASE ORAL at 05:03

## 2024-05-15 RX ADMIN — OXYCODONE HYDROCHLORIDE 20 MILLIGRAM(S): 5 TABLET ORAL at 22:53

## 2024-05-15 RX ADMIN — Medication 100 MILLIGRAM(S): at 21:47

## 2024-05-15 RX ADMIN — PANTOPRAZOLE SODIUM 40 MILLIGRAM(S): 20 TABLET, DELAYED RELEASE ORAL at 05:17

## 2024-05-15 RX ADMIN — Medication 100 MILLIGRAM(S): at 21:48

## 2024-05-15 RX ADMIN — Medication 12 UNIT(S): at 12:16

## 2024-05-15 NOTE — PROGRESS NOTE ADULT - PROBLEM SELECTOR PLAN 2
*** Patient can follow up with (pain specialist) Dr. Hay - [phone], upon discharge from hospital ***. Patient has called office and is awaiting insurance verification to schedule appointment. Patient is also amenable to following up with (pain specialist) Dr. Lowell Don who she has seen in the past.    Upon discharge can send home with: 1 week supply of Robaxin 750 mg q 8hrs PRN for muscle spasms, patient should have oxycodone 20mg tablets at home which was dispensed on 5/2, which she can continue. If not, prescribe oxycodone 20mg IR q 8hrs PRN 3 day supply only. Continue Lyrica 100mg q 8hrs.

## 2024-05-15 NOTE — DIETITIAN INITIAL EVALUATION ADULT - PERTINENT LABORATORY DATA
05-15    140  |  105  |  12  ----------------------------<  250<H>  3.6   |  29  |  0.91    Ca    8.4      15 May 2024 06:10    POCT Blood Glucose.: 230 mg/dL (05-15-24 @ 07:48)  A1C with Estimated Average Glucose Result: 7.7 % (05-09-24 @ 05:27)  A1C with Estimated Average Glucose Result: 7.3 % (02-19-24 @ 05:45)  A1C with Estimated Average Glucose Result: 7.5 % (12-09-23 @ 05:12)

## 2024-05-15 NOTE — DIETITIAN INITIAL EVALUATION ADULT - PROBLEM SELECTOR PLAN 2
s/p lipoma resection 4/15 and [facility]. Checked up on last week, had needle placed in cheek, no further drainage  mild fever at home, now with slightly increased swelling  - F/u CTH / maxillary face eval for abscess  - Will consider starting abx

## 2024-05-15 NOTE — DIETITIAN INITIAL EVALUATION ADULT - PROBLEM SELECTOR PLAN 3
WBC 12 on admission  Likely 2/2 UTI vs facial abscess  pt denies dysuria, UA weakly positive, will hold off on treating for now  - F/u CT Maxilla

## 2024-05-15 NOTE — PROGRESS NOTE ADULT - ASSESSMENT
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 Admitted for intractable pain management and right facial swelling. Pt is s/p lipoma resection 4/15 [facility]. Pt had drainage and had needle placed in cheek by outpatient plastic surgeon, no further drainage was noted by plastic surgeon.  CT abdomen/Pelvis showed no obstructive uropathy, but noted for right kidney stone, IUD, moderate stool, right flank spinal stimulator. CT maxillofacial showed  soft tissue swelling with air containing collection overlying the right malar body and the premaxillary space consistent with an air-containing abscess which is new since 7/20/2023. Patient treated with Unasyn. Patient admitted to medicine for complex regional pain syndrome Type 1. Patient pain not improving, will continue with hydromorphone IV as needed,  pain management following. Pain management recommending outpatient follow up. Patient can follow up with Dr. Hay- [phone] (pain), upon discharge from hospital. Patient developed isolated fever, no blood culture needed as fever isolated.

## 2024-05-15 NOTE — DIETITIAN INITIAL EVALUATION ADULT - OTHER INFO
Pt visited. Pt Reports No chewing Difficulty. S/P R cheek Lipoma Removed in 4/2024. Pt is tolerating Regular consistency. She does not wish to change Consistency of the Diet.  Per Pt H/O DM x ~ 17 Years. IDDM. Pt Reports Compliance to the Diet. Pt Reports Ht 68 inches,  LB. Labs noted.A1c 7.7. NKFA

## 2024-05-15 NOTE — PROGRESS NOTE ADULT - SUBJECTIVE AND OBJECTIVE BOX
NP Note discussed with  Primary Attending    Patient is a 54y old  Female who presents with a chief complaint of Abdominal pain     (15 May 2024 11:09)      INTERVAL HPI/OVERNIGHT EVENTS: no new complaints    MEDICATIONS  (STANDING):  ampicillin/sulbactam  IVPB 3 Gram(s) IV Intermittent every 6 hours  ampicillin/sulbactam  IVPB      atorvastatin 20 milliGRAM(s) Oral at bedtime  benzonatate 100 milliGRAM(s) Oral every 8 hours  buMETAnide 2 milliGRAM(s) Oral daily  carvedilol 3.125 milliGRAM(s) Oral every 12 hours  enoxaparin Injectable 60 milliGRAM(s) SubCutaneous every 12 hours  fluticasone propionate 50 MICROgram(s)/spray Nasal Spray 1 Spray(s) Both Nostrils two times a day  insulin glargine Injectable (LANTUS) 54 Unit(s) SubCutaneous at bedtime  insulin lispro (ADMELOG) corrective regimen sliding scale   SubCutaneous at bedtime  insulin lispro (ADMELOG) corrective regimen sliding scale   SubCutaneous three times a day before meals  insulin lispro Injectable (ADMELOG) 12 Unit(s) SubCutaneous three times a day before meals  lidocaine   4% Patch 3 Patch Transdermal daily  melatonin 6 milliGRAM(s) Oral at bedtime  methocarbamol 750 milliGRAM(s) Oral every 8 hours  montelukast 10 milliGRAM(s) Oral at bedtime  pantoprazole    Tablet 40 milliGRAM(s) Oral before breakfast  pregabalin 100 milliGRAM(s) Oral three times a day  senna 2 Tablet(s) Oral at bedtime    MEDICATIONS  (PRN):  acetaminophen     Tablet .. 650 milliGRAM(s) Oral every 6 hours PRN Temp greater or equal to 38C (100.4F), Mild Pain (1 - 3)  acetaminophen     Tablet .. 1000 milliGRAM(s) Oral every 8 hours PRN Moderate Pain (4 - 6)  aluminum hydroxide/magnesium hydroxide/simethicone Suspension 30 milliLiter(s) Oral every 4 hours PRN Dyspepsia  HYDROmorphone  Injectable 1 milliGRAM(s) IV Push every 8 hours PRN Pain  lactulose Syrup 20 Gram(s) Oral three times a day PRN Constipation  ondansetron Injectable 4 milliGRAM(s) IV Push every 8 hours PRN Nausea and/or Vomiting  oxyCODONE    IR 20 milliGRAM(s) Oral every 8 hours PRN Severe Pain (7 - 10)      __________________________________________________  REVIEW OF SYSTEMS:    CONSTITUTIONAL: No fever,   EYES: no acute visual disturbances  NECK: No pain or stiffness  RESPIRATORY: No cough; No shortness of breath  CARDIOVASCULAR: No chest pain, no palpitations  GASTROINTESTINAL: No pain. No nausea or vomiting; No diarrhea   NEUROLOGICAL: No headache or numbness, no tremors  MUSCULOSKELETAL: No joint pain, no muscle pain  GENITOURINARY: no dysuria, no frequency, no hesitancy  PSYCHIATRY: no depression , no anxiety  ALL OTHER  ROS negative        Vital Signs Last 24 Hrs  T(C): 36.8 (15 May 2024 04:50), Max: 36.9 (14 May 2024 20:42)  T(F): 98.2 (15 May 2024 04:50), Max: 98.4 (14 May 2024 20:42)  HR: 62 (15 May 2024 04:50) (60 - 62)  BP: 143/65 (15 May 2024 04:50) (131/54 - 143/65)  BP(mean): 72 (14 May 2024 20:42) (72 - 72)  RR: 18 (15 May 2024 04:50) (17 - 18)  SpO2: 97% (15 May 2024 04:50) (94% - 97%)    Parameters below as of 15 May 2024 04:50  Patient On (Oxygen Delivery Method): room air        ________________________________________________  PHYSICAL EXAM:  GENERAL: NAD  HEENT: Normocephalic;  conjunctivae and sclerae clear; moist mucous membranes;   NECK : supple  CHEST/LUNG: Clear to auscultation bilaterally with good air entry   HEART: S1 S2  regular; no murmurs, gallops or rubs  ABDOMEN: Soft, Nontender, Nondistended; Bowel sounds present  EXTREMITIES: no cyanosis; no edema; no calf tenderness  SKIN: warm and dry; no rash  NERVOUS SYSTEM:  Awake and alert; Oriented  to place, person and time ; no new deficits    _________________________________________________  LABS:                        11.0   8.62  )-----------( 307      ( 15 May 2024 06:10 )             34.5     05-15    140  |  105  |  12  ----------------------------<  250<H>  3.6   |  29  |  0.91    Ca    8.4      15 May 2024 06:10        Urinalysis Basic - ( 15 May 2024 06:10 )    Color: x / Appearance: x / SG: x / pH: x  Gluc: 250 mg/dL / Ketone: x  / Bili: x / Urobili: x   Blood: x / Protein: x / Nitrite: x   Leuk Esterase: x / RBC: x / WBC x   Sq Epi: x / Non Sq Epi: x / Bacteria: x      CAPILLARY BLOOD GLUCOSE      POCT Blood Glucose.: 232 mg/dL (15 May 2024 12:10)  POCT Blood Glucose.: 230 mg/dL (15 May 2024 07:48)  POCT Blood Glucose.: 234 mg/dL (14 May 2024 21:07)  POCT Blood Glucose.: 153 mg/dL (14 May 2024 17:14)        RADIOLOGY & ADDITIONAL TESTS:    Imaging  Reviewed:  YES/NO    Consultant(s) Notes Reviewed:   YES/ No      Plan of care was discussed with patient and /or primary care giver; all questions and concerns were addressed

## 2024-05-15 NOTE — DIETITIAN INITIAL EVALUATION ADULT - PERTINENT MEDS FT
MEDICATIONS  (STANDING):  ampicillin/sulbactam  IVPB 3 Gram(s) IV Intermittent every 6 hours  ampicillin/sulbactam  IVPB      atorvastatin 20 milliGRAM(s) Oral at bedtime  benzonatate 100 milliGRAM(s) Oral every 8 hours  buMETAnide 2 milliGRAM(s) Oral daily  carvedilol 3.125 milliGRAM(s) Oral every 12 hours  enoxaparin Injectable 60 milliGRAM(s) SubCutaneous every 12 hours  fluticasone propionate 50 MICROgram(s)/spray Nasal Spray 1 Spray(s) Both Nostrils two times a day  insulin glargine Injectable (LANTUS) 54 Unit(s) SubCutaneous at bedtime  insulin lispro (ADMELOG) corrective regimen sliding scale   SubCutaneous at bedtime  insulin lispro (ADMELOG) corrective regimen sliding scale   SubCutaneous three times a day before meals  insulin lispro Injectable (ADMELOG) 12 Unit(s) SubCutaneous three times a day before meals  lidocaine   4% Patch 3 Patch Transdermal daily  melatonin 6 milliGRAM(s) Oral at bedtime  methocarbamol 750 milliGRAM(s) Oral every 8 hours  montelukast 10 milliGRAM(s) Oral at bedtime  pantoprazole    Tablet 40 milliGRAM(s) Oral before breakfast  pregabalin 100 milliGRAM(s) Oral three times a day  senna 2 Tablet(s) Oral at bedtime    MEDICATIONS  (PRN):  acetaminophen     Tablet .. 650 milliGRAM(s) Oral every 6 hours PRN Temp greater or equal to 38C (100.4F), Mild Pain (1 - 3)  acetaminophen     Tablet .. 1000 milliGRAM(s) Oral every 8 hours PRN Moderate Pain (4 - 6)  aluminum hydroxide/magnesium hydroxide/simethicone Suspension 30 milliLiter(s) Oral every 4 hours PRN Dyspepsia  HYDROmorphone  Injectable 1 milliGRAM(s) IV Push every 8 hours PRN Pain  lactulose Syrup 20 Gram(s) Oral three times a day PRN Constipation  ondansetron Injectable 4 milliGRAM(s) IV Push every 8 hours PRN Nausea and/or Vomiting  oxyCODONE    IR 20 milliGRAM(s) Oral every 8 hours PRN Severe Pain (7 - 10)

## 2024-05-15 NOTE — PROGRESS NOTE ADULT - ASSESSMENT
Search Terms: Fauzia Linares, 1970Search Date: 05/08/2024 09:30:53 AM  The Drug Utilization Report below displays all of the controlled substance prescriptions, if any, that your patient has filled in the last twelve months. The information displayed on this report is compiled from pharmacy submissions to the Department, and accurately reflects the information as submitted by the pharmacies.    This report was requested by: Danni Silva | Reference #: [id number]    Practitioner Count: 5  Pharmacy Count: 2  Current Opioid Prescriptions: 1  Current Benzodiazepine Prescriptions: 0  Current Stimulant Prescriptions: 0      Patient Demographic Information (PDI)       PDI	First Name	Last Name	Birth Date	Gender	Street Address	[facility]	Zip Code  A	Fauzia Linares	1970	Female	[address]	[place]	[address]  B	Fauzia Linares	1970	Female	[address]	[place]	[address]  C	Fauzia Linares	1970	Female	[address]	[facility]	[place]	[address]  D	Fauzia Linares	1970	Female	[address]	[facility]	[place]	[address]  E	Fauzia Linares	1970	Female	[address]	[facility]	[place]	[address]  F	Fauzia Linares	1970	Female	[address]	[facility]	[place]	[address]  G	Fauzia Linares	1970	Female	[address]	[place]	[address]    Prescription Information      PDI Filter:    PDI	My Rx	Current Rx	Drug Type	Rx Written	Rx Dispensed	Drug	Quantity	Days Supply	Prescriber Name	Prescriber CROW #	Payment Method	Dispenser  A	N	N	O	03/26/2024	03/26/2024	oxycodone hcl (ir) 5 mg tablet	30	5	Quionnes, Elizabeth MERLOS	[id number]	[facility]	N	N	O	03/26/2024	03/26/2024	oxycodone hcl (ir) 10 mg tab	30	5	Quinones, Elizabeth MERLOS	[id number]	[facility]  A	N	N	O	03/26/2024	03/26/2024	oxycontin er 10 mg tablet	10	5	Quinones, Elizabeth MERLOS	[id number]	[facility]  B	N	N		10/06/2023	10/07/2023	pregabalin 100 mg capsule	90	30	Tc, Reba	[id number]	Medicare	[facility]  B	N	N	O	09/28/2023	10/01/2023	oxycodone hcl (ir) 15 mg tab	30	5	Tc, Reba	[id number]	Medicare	[facility]  B	N	N	O	09/27/2023	09/27/2023	oxycodone hcl (ir) 15 mg tab	30	8	Tc, Reba	[id number]	Medicare	[facility]  B	N	N	O	09/10/2023	09/10/2023	oxycodone hcl (ir) 15 mg tab	30	5	Tc, Reba	[id number]	Medicare	[facility]  B	N	N		09/06/2023	09/06/2023	pregabalin 100 mg capsule	90	30	Tc, Reba	[id number]	Medicare	[facility]  B	N	N	O	08/27/2023	08/28/2023	oxycodone hcl (ir) 15 mg tab	30	5	Tc, Reba	[id number]	Medicare	[facility]  B	N	N	O	08/12/2023	08/13/2023	oxycodone hcl (ir) 15 mg tab	30	5	Tc, Reba	[id number]	Medicare	[facility]  B	N	N		08/04/2023	08/05/2023	pregabalin 100 mg capsule	90	30	Tc, Reba	[id number]	Medicare	[facility]  B	N	N		07/21/2023	07/22/2023	pregabalin 100 mg capsule	42	14	Tc, Reba	[id number]	Medicare	[facility]  B	N	N	O	07/16/2023	07/16/2023	oxycodone hcl (ir) 15 mg tab	30	7	Tc, Reba	[id number]	Medicare	[facility]  B	N	N	O	06/30/2023	07/01/2023	oxycodone hcl (ir) 15 mg tab	42	10	Tc, Reba	[id number]	Medicare	[facility]  B	N	N		06/19/2023	06/20/2023	pregabalin 100 mg capsule	90	30	Tc, Reba	[id number]	Medicare	[facility]  B	N	N	O	06/06/2023	06/07/2023	oxycodone hcl (ir) 15 mg tab	42	7	Tc, Reba	[id number]	Medicare	[facility]  B	N	N	O	05/22/2023	05/23/2023	oxycodone hcl (ir) 15 mg tab	42	10	Tc, Reba	[id number]	Medicare	[facility]  B	N	N		05/19/2023	05/20/2023	pregabalin 100 mg capsule	90	30	Tc, Reba	[id number]	Medicare	[facility]  C	N	N	O	11/28/2023	11/30/2023	oxycodone hcl (ir) 20 mg tab	60	30	Vinny Maldonado MD	[id number]	Insurance	[id number]  C	N	N	O	11/16/2023	11/18/2023	oxycodone-acetaminophen  mg tab	30	15	Vinny Maldonado MD	[id number]	Insurance	[id number]  C	N	N		11/16/2023	11/18/2023	pregabalin 100 mg capsule	90	30	Vinny Maldonado MD	[id number]	Insurance	[id number]  C	N	N		09/28/2023	11/01/2023	pregabalin 100 mg capsule	30	10	Tc, Reba	[id number]	Insurance	[id number]  D	N	N	O	12/29/2023	12/29/2023	oxycodone hcl (ir) 15 mg tab	12	3	Christopher Montiel	[id number]	Insurance	[facility]  D	N	N		12/29/2023	12/29/2023	butalbital-acetaminophen-caffeine -40 mg tablet	53	8	Christopher Montiel	[id number]	Insurance	[facility]	N	N	O	10/12/2023	10/14/2023	oxycodone hcl (ir) 15 mg tab	28	7	Naomi Ybarra	[id number]	Insurance	[id number]  E	N	N		02/08/2024	02/09/2024	pregabalin 100 mg capsule	42	7	Alba Jasso M	[id number]	Insurance	[facility]  E	N	N	O	02/08/2024	02/09/2024	hydromorphone 4 mg tablet	20	5	Alba Jasso M	[id number]	[facility]  E	N	N		01/25/2024	01/26/2024	pregabalin 100 mg capsule	90	30	Vinny Maldonado MD	[id number]	[facility]	N	N	O	01/25/2024	01/26/2024	oxycodone hcl (ir) 20 mg tab	60	30	Vinny Maldonado MD	[id number]	[facility]	N	Y		04/03/2024	05/06/2024	pregabalin 100 mg capsule	90	30	Vinny Maldonado MD	[id number]	[facility]	N	Y	O	04/30/2024	05/02/2024	oxycodone hcl (ir) 20 mg tab	60	30	Vinny Maldonado MD	[id number]	[facility]	N	N	O	04/16/2024	04/17/2024	oxycodone hcl (ir) 5 mg tablet	10	3	Frank Ba	[id number]	[facility]	N	N		04/03/2024	04/04/2024	pregabalin 100 mg capsule	90	30	Vinny Maldonado MD	[id number]	[facility]	N	N	O	03/04/2024	03/05/2024	hydromorphone 4 mg tablet	20	5	Bonny Bryant	[id number]	[facility]  G	N	N		09/28/2023	10/02/2023	pregabalin 100 mg capsule	30	10	Reba Montez	[id number]	[id number]    * - Details of Drug Type : O = Opioid, B = Benzodiazepine, S = Stimulant    * - Drugs marked with an asterisk are compound drugs. If the compound drug is made up of more than one controlled substance, then each controlled substance will be a separate row in the table.

## 2024-05-15 NOTE — PROGRESS NOTE ADULT - PROBLEM SELECTOR PLAN 4
Pt has a history of DM, takes Tresiba 54u qhs and 12u Novolog TID and Metformin 1000mg BID at home  - glucose elevated this am 392  - C/w mod ISS  - continue Lantus 54u qhs  - continue admelog 12 units tid before meals  - FS ACHS  - monitor for improvement  A1c 7.7

## 2024-05-15 NOTE — PROGRESS NOTE ADULT - SUBJECTIVE AND OBJECTIVE BOX
Source of information: ROBIN LÓPEZ, Chart review  Patient language: English  : n/a    HPI:  This is a 53 y/o F, from home, lives alone, ambulates with cane/walker/wheelchair, with PMHx of DM, HTN, HLD, CHF, complex regional pain syndrome of right back intercostal muscles s/p spinal cord stimulator (Mar 2023) who presents with right flank pain. Pt states she is having a flare up of her complex regional pain syndrome that started 2 days ago. Pt mentions she ran out of her pain medications and has been taking up to six 800mg Ibuprofen daily. Pt does not follow with a pain specialist currently. Pt also states she had a recent lipoma resection from her right cheek (4/15/2024) after which she completed a course of antibiotics. Pt states she was having drainage and went to see her plastic surgeon on May 2nd, after which they placed a needle in the cheek and drained out fluid. Pt states that since that time she has not had any further fluid drain. Pt mentions she now has pain and slightly increased swelling in the cheek area. Pt mentions she had a 100.6 fever this morning and felt chills. Pt denies any recent travel, recent illness, dysuria, CP, SOB, N/V/D, or constipation. (08 May 2024 09:45)    Pt is admitted for intractable right flank pain. Pain service consulted on 5/8 for management of right flank pain. Per patient, she was diagnosed with complex regional pain syndrome and right sided intercostal neuralgia in 2016. Since her diagnosis she has trialed numerous medications/interventions without relief, including (amitriptyline and duloxetine which caused patti, max dose of gabapentin, and epidural spinal injections). She was recommended a spinal cord stimulator which was placed in March 2023, however she reports that it is not effective in managing her symptoms. She does not currently have an outpatient pain management provider and has been relying on her PCP to prescribe opioids to manage her pain. Patient also present with right facial swelling/droop since 4/24 after removal of right cheek mass. CT Maxillofacial demonstrates soft tissue swelling with air containing collection overlying the right malar body and the premaxillary space consistent with an air-containing abscess which is new since 7/20/2023.   Pt seen and examined this morning, while laying in bed. At time of assessment, patient A&Ox4, reports pain score 7/10 to right flank and right low back. SCALE USED: (1-10 VNRS). Pt continues to describe pain as constant, sharp, burning, and aching in quality, also reports intermittent muscle spasms. Patient states that "Dilaudid IV" is the only medication that works for her during a CRPS flare. The pain is mildly alleviated with current regimen and is exacerbated by movement and taking deep inhalations. At baseline patient reports pain level at 9/10. Pt tolerating PO diet. Denies lethargy, chest pain, SOB, nausea, vomiting. She reports history of constipation. Last BM 5/14. Patient stated goal for pain control: to be able to take deep breaths, get out of bed to chair and ambulate with tolerable pain control. Patient ambulates with rollator/straight cane at baseline and uses wheelchair for long distances. Per patient, her most recent pain regimen includes xanaflex 4mg TID, Lyrica 100mg TID, and oxycodone 20mg BID. Pt also reports being admitted to [facility] on 3/2024 with same symptoms and flare where she was treated with Dilaudid IV and Ketamine IV to control acute pain and then DC'd home. To note, director of pain services contacted pain specialist Dr. Lowell Don at [facility] and [facility] who has seen patient in the past. Patient is amenable to seeing Dr. Don again and reports missing previous follow up appointments due to frequent hospitalizations.     PAST MEDICAL & SURGICAL HISTORY:  HTN (hypertension)    Neuropathy    Obesity    Former cigarette smoker  (smoked x 45 years; quit ~2013)    Marijuana smoker, continuous  (states smokes 3 - 4 times per day for pain management reasons)    Neuralgia  (pt states hx/o "intercostal neuralgia")    Diabetes    Essential hypertension    IBS (irritable bowel syndrome)    Complex regional pain syndrome type 1    DM2 (diabetes mellitus, type 2)    Intercostal neuralgia    Localized swelling, mass and lump, head    Asthma    History of CHF (congestive heart failure)    Fibroids    Idiopathic intracranial hypertension    History of cholecystectomy    History of hand surgery  (pt states she had 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12)    Spinal cord neurostimulator device in situ    History of removal of cyst    FAMILY HISTORY:  FH: HTN (hypertension)    Social History:  Former smoker, quit in 2014, up to 1 PPD for 35 years  denies EtOH use (08 May 2024 09:45)   [x]Denies ETOH use, illicit drug use, and smoking     Allergies    amitriptyline (Other)  duloxetine (Other)    Intolerances    MEDICATIONS  (STANDING):  ampicillin/sulbactam  IVPB      ampicillin/sulbactam  IVPB 3 Gram(s) IV Intermittent every 6 hours  atorvastatin 20 milliGRAM(s) Oral at bedtime  benzonatate 100 milliGRAM(s) Oral every 8 hours  buMETAnide 2 milliGRAM(s) Oral daily  carvedilol 3.125 milliGRAM(s) Oral every 12 hours  enoxaparin Injectable 60 milliGRAM(s) SubCutaneous every 12 hours  fluticasone propionate 50 MICROgram(s)/spray Nasal Spray 1 Spray(s) Both Nostrils two times a day  insulin glargine Injectable (LANTUS) 54 Unit(s) SubCutaneous at bedtime  insulin lispro (ADMELOG) corrective regimen sliding scale   SubCutaneous at bedtime  insulin lispro (ADMELOG) corrective regimen sliding scale   SubCutaneous three times a day before meals  insulin lispro Injectable (ADMELOG) 12 Unit(s) SubCutaneous three times a day before meals  lidocaine   4% Patch 3 Patch Transdermal daily  melatonin 6 milliGRAM(s) Oral at bedtime  methocarbamol 750 milliGRAM(s) Oral every 8 hours  montelukast 10 milliGRAM(s) Oral at bedtime  pantoprazole    Tablet 40 milliGRAM(s) Oral before breakfast  pregabalin 100 milliGRAM(s) Oral three times a day  senna 2 Tablet(s) Oral at bedtime    MEDICATIONS  (PRN):  acetaminophen     Tablet .. 650 milliGRAM(s) Oral every 6 hours PRN Temp greater or equal to 38C (100.4F), Mild Pain (1 - 3)  acetaminophen     Tablet .. 1000 milliGRAM(s) Oral every 8 hours PRN Moderate Pain (4 - 6)  aluminum hydroxide/magnesium hydroxide/simethicone Suspension 30 milliLiter(s) Oral every 4 hours PRN Dyspepsia  HYDROmorphone  Injectable 1 milliGRAM(s) IV Push every 8 hours PRN Pain  lactulose Syrup 20 Gram(s) Oral three times a day PRN Constipation  ondansetron Injectable 4 milliGRAM(s) IV Push every 8 hours PRN Nausea and/or Vomiting  oxyCODONE    IR 20 milliGRAM(s) Oral every 8 hours PRN Severe Pain (7 - 10)    Vital Signs Last 24 Hrs  T(C): 36.8 (15 May 2024 04:50), Max: 36.9 (14 May 2024 20:42)  T(F): 98.2 (15 May 2024 04:50), Max: 98.4 (14 May 2024 20:42)  HR: 62 (15 May 2024 04:50) (60 - 62)  BP: 143/65 (15 May 2024 04:50) (131/54 - 143/65)  BP(mean): 72 (14 May 2024 20:42) (72 - 72)  RR: 18 (15 May 2024 04:50) (17 - 18)  SpO2: 97% (15 May 2024 04:50) (94% - 97%)    Parameters below as of 15 May 2024 04:50  Patient On (Oxygen Delivery Method): room air    SARS-CoV-2: NotDetec (25 Mar 2024 15:31)  SARS-CoV-2: NotDetec (08 Dec 2023 01:28)    LABS: Reviewed               11.0   8.62  )-----------( 307      ( 15 May 2024 06:10 )             34.5     05-15    140  |  105  |  12  ----------------------------<  250<H>  3.6   |  29  |  0.91    Ca    8.4      15 May 2024 06:10    Urinalysis Basic - ( 15 May 2024 06:10 )    Color: x / Appearance: x / SG: x / pH: x  Gluc: 250 mg/dL / Ketone: x  / Bili: x / Urobili: x   Blood: x / Protein: x / Nitrite: x   Leuk Esterase: x / RBC: x / WBC x   Sq Epi: x / Non Sq Epi: x / Bacteria: x    CAPILLARY BLOOD GLUCOSE    POCT Blood Glucose.: 230 mg/dL (15 May 2024 07:48)  POCT Blood Glucose.: 234 mg/dL (14 May 2024 21:07)  POCT Blood Glucose.: 153 mg/dL (14 May 2024 17:14)  POCT Blood Glucose.: 137 mg/dL (14 May 2024 11:34)    SARS-CoV-2: NotDetec (25 Mar 2024 15:31)  SARS-CoV-2: NotDetec (08 Dec 2023 01:28)    Radiology: Reviewed    ACC: [id number] EXAM:  CT MAXILLOFACIAL  IC   ORDERED BY: BEN HANKS     PROCEDURE DATE:  05/08/2024      INTERPRETATION:  Clinical indication: Cheeks swelling.    Serial thin sections on a multi slice scanner were obtained through the   orbits and paranasal sinuses after contrast infusion with sagittal and   coronal computer-generated reconstructed views. 90 cc of Omnipaque 350   were intravenously injected, 10 cc were discarded.    Comparison is made with the prior maxillofacial CT of7/20/2023.    There is soft tissue swelling overlying the maxilla with inflammatory   changes in the fat. There is a air containing collection within this area   of soft tissue swelling. The collection measures 2.1 cm in AP diameter by   2.6 cm transversely by 2.8 cm in craniocaudal diameter. The air   collection measures 1.6 cm in AP diameter by 2 cm transversely by 2.8 cm   in craniocaudal diameter. This is consistent with an abscess with air   which may be iatrogenic.    There is no adjacent bone destruction. There is no adjacent periodontal   disease. There is a retention cyst or polyp in the right maxillary sinus.   The malar body and zygomatic arch is intact. The nasal septum is midline.   The left maxillary sinus and ethmoid sphenoid and frontal sinuses are   clear.    The visualized vascular structures are intact. The uvula is normal, the   epiglottis is normal. The visualized salivary glands are normal.    IMPRESSION: Soft tissue swelling with air containing collection overlying   the right malar body and the premaxillary space consistent with an   air-containing abscess which is new since 7/20/2023.    --- End of Report ---    ELMA SIDHU MD; Attending Radiologist  This document has been electronically signed. May8 2024  4:35PM  ACC: [id number] EXAM:  CT ABDOMEN AND PELVIS   ORDERED BY: TRACE ROYAL     PROCEDURE DATE:  05/08/2024      INTERPRETATION:  CLINICAL INFORMATION: Right flank pain    COMPARISON: CT abdomen pelvis 1/22/2023    CONTRAST/COMPLICATIONS:  IV Contrast: None  Oral Contrast: None  Complications: None    PROCEDURE:  CT of the Abdomen and Pelvis was performed.  Sagittal and coronal reformats were performed.    FINDINGS:  LOWER CHEST: Aortic root, coronary artery, mitral annular calcifications.   Linear subsegmental atelectasis in the lingula.    LIVER: Within normal limits.  BILE DUCTS: Normal caliber.  GALLBLADDER: Cholecystectomy.  SPLEEN: Within normal limits.  PANCREAS: Within normal limits.  ADRENALS: Within normal limits.  KIDNEYS/URETERS: Punctate nonobstructing stone in the interpolar region   of the right kidney. No hydronephrosis or perinephric inflammation.    BLADDER: Within normal limits.  REPRODUCTIVE ORGANS: Intrauterine device. No gross adnexal masses.    BOWEL: Duodenal diverticulum. No bowel obstruction. Appendix is normal.   Moderate stool in the rectosigmoid colon. No bowel wall thickening or   pericolonic inflammatory change.  PERITONEUM: No ascites.  VESSELS: Mild atherosclerotic changes.  RETROPERITONEUM/LYMPH NODES: No lymphadenopathy.  ABDOMINAL WALL: Small fat-containing umbilical hernia. Spinal stimulator   generator in the right flank subcutaneous soft tissues. Mild diffuse   anasarca.  BONES: Degenerative changes. Spinal stimulator electrodes.    IMPRESSION:  No evidence for obstructive uropathy.    --- End of Report ---    MILE COSTELLO MD; Attending Radiologist  This document has been electronically signed. May  8 2024  6:54AM  ACC: [id number] EXAM:  XR CHEST PA LAT 2V   ORDERED BY: TRACE ROYAL     PROCEDURE DATE:  05/08/2024      INTERPRETATION:  EXAM: XR CHEST PA AND LATERAL    INDICATION: R flan kpain HXR    COMPARISON: March 23, 2024    IMPRESSION: Some linear scarring and/or platelike atelectatic change in   the left and right chest. Some slight prominence to interstitial markings   in the right infrahilar region which could represent an inflammatory   infectious process and/or atelectatic change suggested on the frontal   view. Heart is within normal limits in its transthoracic diameter.   Regional osseous structures appropriate for age    --- End of Report ---    MARK BECK MD; Attending Radiologist  This document has been electronically signed. May  8 2024  9:00AM    ORT Score -   Family Hx of substance abuse	Female	      Male  Alcohol 	                                           1                     3  Illegal drugs	                                   2                     3  Rx drugs                                           4 	                  4  Personal Hx of substance abuse		  Alcohol 	                                          3	                  3  Illegal drugs                                     4	                  4  Rx drugs                                            5 	                  5  Age between 16- 45 years	           1                     1  hx preadolescent sexual abuse	   3 	                  0  Psychological disease		  ADD, OCD, bipolar, schizophrenia   2	          2  Depression                                           1 	          1  Total: 0    a score of 3 or lower indicates low risk for opioid abuse		  a score of 4-7 indicates moderate risk for opioid abuse		  a score of 8 or higher indicates high risk for opioid abuse    REVIEW OF SYSTEMS:  CONSTITUTIONAL: No fever or fatigue  HEENT:  No difficulty hearing, no change in vision  NECK: No pain or stiffness  RESPIRATORY: No cough, wheezing, chills or hemoptysis; No shortness of breath  CARDIOVASCULAR: No chest pain, palpitations, dizziness, or leg swelling  GASTROINTESTINAL: No loss of appetite, decreased PO intake. No abdominal or epigastric pain. No nausea, vomiting; No diarrhea or constipation.   GENITOURINARY: No dysuria, frequency, hematuria, retention or incontinence  MUSCULOSKELETAL: + right flank pain; + right upper and lower back pain, chronic upper or lower motor strength weakness due to pain, no saddle anesthesia, bowel/bladder incontinence, no falls   NEURO: No headaches, + numbness/tingling b/l LE, No weakness  ENDOCRINE: No polyuria, polydipsia, heat or cold intolerance; No hair loss  PSYCHIATRIC: No depression, anxiety or difficulty sleeping    PHYSICAL EXAM:  GENERAL:  Alert & Oriented X4, cooperative, NAD, Good concentration. Speech is clear, (right facial droop, since 4/2024 after surgical removal of right cheek mass.)  RESPIRATORY: Respirations even and unlabored. Clear to auscultation bilaterally  CARDIOVASCULAR: Normal S1/S2, regular rate and rhythm  GASTROINTESTINAL:  Soft, obese per BMI, right flank tenderness, bowel sounds present  PERIPHERAL VASCULAR: Extremities warm without edema. 2+ Peripheral Pulses, No cyanosis, No calf tenderness  MUSCULOSKELETAL: Motor Strength 4/5 B/L upper and lower extremities; moves all extremities equally against gravity; + right upper and lower back tenderness  SKIN: Warm, dry, intact, spinal cord stimulator battery palpable to right low back, mid back scar from spinal cord stimulator insertion    Risk factors associated with adverse outcomes related to opioid treatment  [ ]  Concurrent benzodiazepine use  [ ]  History/ Active substance use or alcohol use disorder  [ ] Psychiatric co-morbidity  [ ] Sleep apnea  [ ] COPD  [x] BMI> 35  [ ] Liver dysfunction  [ ] Renal dysfunction  [ ] CHF  [x] Former smoker  [ ]  Age > 60 years    [x]  [place]  Reviewed and Copied to Chart. See below.    Plan of care and goal oriented pain management treatment options were discussed with patient and /or primary care giver; all questions and concerns were addressed and care was aligned with patient's wishes.    Educated patient on goal oriented pain management treatment options

## 2024-05-15 NOTE — PROGRESS NOTE ADULT - PROBLEM SELECTOR PLAN 3
-s/p lipoma resection 4/15 and [facility]. Checked up on last week, had needle placed in cheek, no further drainage  mild fever at home, now with slightly increased swelling  -c/w Unasyn  -viscous lidocaine q4h PRN  -send blood culture if patient is febrile again   -trend esr and crp  -ID Dr. Short following   -f/u outpatient with Plastic, outpatient plastic NP: Bebeto Hay [phone]

## 2024-05-15 NOTE — PROGRESS NOTE ADULT - PROBLEM SELECTOR PLAN 1
pain improving, however patient complains that she is still needing better regimen for pain management  bowel regimen: senna and miralax  Outpatient follow up with pain management   -Pain management following

## 2024-05-15 NOTE — PROGRESS NOTE ADULT - PROBLEM SELECTOR PLAN 1
Pt with chronic right flank pain which is somatic and neuropathic in nature due to history of complex regional pain syndrome and right intercostal neuralgia. Since diagnosis pt has tried numerous medications/interventions without relief, including (amitriptyline and duloxetine which caused patti, max dose of gabapentin, and epidural spinal injections). She was recommended a spinal cord stimulator which was placed in 3/23, however she reports that it is not effective in managing her symptoms. She does not currently have an outpatient pain management provider and has been relying on her PCP to prescribe opioids to manage her pain.   Opioid pain recommendations   - Recommend discontinuing Dilaudid 1 mg IVP q4h PRN for severe pain. (In preparation for discharge home patient needs PO regimen )   - Start oxycodone 20mg PO q 8hrs PRN for severe pain. (patient's most recent home dose, verified on iSTOP) Monitor for respiratory depression/sedation.  Non-opioid pain recommendations   - Continue Robaxin 750 mg po q8h x 5 days. Monitor drowsiness.  - Completed Acetaminophen 1 gram PO q 8 hours x 3 days. Start 1g q 8hrs PRN for moderate pain.   - Continue Lyrica 100mg q 8hrs. (home med)  - Continue Lidoderm 4% patch daily. x 3 (12 hrs on/12 hrs off)  Bowel Regimen  - Continue Miralax 17G PO daily   - Continue Senna 2 tablets at bedtime for constipation  Mild pain (score 1-3)  - Non-pharmacological pain treatment recommendations  - Warm/ Cool packs PRN   - Repositioning extremity, elevation, imagery, relaxation, distraction.  - Physical therapy OOB if no contraindications   Recommendations discussed with primary team and RN.

## 2024-05-16 ENCOUNTER — APPOINTMENT (OUTPATIENT)
Dept: PLASTIC SURGERY | Facility: CLINIC | Age: 54
End: 2024-05-16

## 2024-05-16 LAB
GLUCOSE BLDC GLUCOMTR-MCNC: 167 MG/DL — HIGH (ref 70–99)
GLUCOSE BLDC GLUCOMTR-MCNC: 175 MG/DL — HIGH (ref 70–99)
GLUCOSE BLDC GLUCOMTR-MCNC: 238 MG/DL — HIGH (ref 70–99)
GLUCOSE BLDC GLUCOMTR-MCNC: 283 MG/DL — HIGH (ref 70–99)

## 2024-05-16 PROCEDURE — 99233 SBSQ HOSP IP/OBS HIGH 50: CPT

## 2024-05-16 RX ORDER — HYDROMORPHONE HYDROCHLORIDE 2 MG/ML
1 INJECTION INTRAMUSCULAR; INTRAVENOUS; SUBCUTANEOUS EVERY 6 HOURS
Refills: 0 | Status: DISCONTINUED | OUTPATIENT
Start: 2024-05-16 | End: 2024-05-16

## 2024-05-16 RX ORDER — HYDROMORPHONE HYDROCHLORIDE 2 MG/ML
1 INJECTION INTRAMUSCULAR; INTRAVENOUS; SUBCUTANEOUS EVERY 6 HOURS
Refills: 0 | Status: DISCONTINUED | OUTPATIENT
Start: 2024-05-16 | End: 2024-05-17

## 2024-05-16 RX ORDER — ONDANSETRON 8 MG/1
4 TABLET, FILM COATED ORAL ONCE
Refills: 0 | Status: COMPLETED | OUTPATIENT
Start: 2024-05-16 | End: 2024-05-16

## 2024-05-16 RX ORDER — OXYCODONE HYDROCHLORIDE 5 MG/1
20 TABLET ORAL EVERY 8 HOURS
Refills: 0 | Status: DISCONTINUED | OUTPATIENT
Start: 2024-05-16 | End: 2024-05-18

## 2024-05-16 RX ADMIN — PANTOPRAZOLE SODIUM 40 MILLIGRAM(S): 20 TABLET, DELAYED RELEASE ORAL at 05:46

## 2024-05-16 RX ADMIN — Medication 1000 MILLIGRAM(S): at 01:42

## 2024-05-16 RX ADMIN — ATORVASTATIN CALCIUM 20 MILLIGRAM(S): 80 TABLET, FILM COATED ORAL at 21:26

## 2024-05-16 RX ADMIN — Medication 2: at 11:24

## 2024-05-16 RX ADMIN — OXYCODONE HYDROCHLORIDE 20 MILLIGRAM(S): 5 TABLET ORAL at 18:02

## 2024-05-16 RX ADMIN — ONDANSETRON 4 MILLIGRAM(S): 8 TABLET, FILM COATED ORAL at 09:26

## 2024-05-16 RX ADMIN — Medication 12 UNIT(S): at 17:32

## 2024-05-16 RX ADMIN — Medication 1000 MILLIGRAM(S): at 02:42

## 2024-05-16 RX ADMIN — AMPICILLIN SODIUM AND SULBACTAM SODIUM 200 GRAM(S): 250; 125 INJECTION, POWDER, FOR SUSPENSION INTRAMUSCULAR; INTRAVENOUS at 17:25

## 2024-05-16 RX ADMIN — CARVEDILOL PHOSPHATE 3.12 MILLIGRAM(S): 80 CAPSULE, EXTENDED RELEASE ORAL at 17:26

## 2024-05-16 RX ADMIN — HYDROMORPHONE HYDROCHLORIDE 1 MILLIGRAM(S): 2 INJECTION INTRAMUSCULAR; INTRAVENOUS; SUBCUTANEOUS at 08:21

## 2024-05-16 RX ADMIN — HYDROMORPHONE HYDROCHLORIDE 1 MILLIGRAM(S): 2 INJECTION INTRAMUSCULAR; INTRAVENOUS; SUBCUTANEOUS at 09:00

## 2024-05-16 RX ADMIN — MONTELUKAST 10 MILLIGRAM(S): 4 TABLET, CHEWABLE ORAL at 21:26

## 2024-05-16 RX ADMIN — Medication 100 MILLIGRAM(S): at 05:46

## 2024-05-16 RX ADMIN — ENOXAPARIN SODIUM 60 MILLIGRAM(S): 100 INJECTION SUBCUTANEOUS at 05:46

## 2024-05-16 RX ADMIN — Medication 6: at 08:21

## 2024-05-16 RX ADMIN — METHOCARBAMOL 750 MILLIGRAM(S): 500 TABLET, FILM COATED ORAL at 21:26

## 2024-05-16 RX ADMIN — BUMETANIDE 2 MILLIGRAM(S): 0.25 INJECTION INTRAMUSCULAR; INTRAVENOUS at 05:45

## 2024-05-16 RX ADMIN — ONDANSETRON 4 MILLIGRAM(S): 8 TABLET, FILM COATED ORAL at 21:42

## 2024-05-16 RX ADMIN — OXYCODONE HYDROCHLORIDE 20 MILLIGRAM(S): 5 TABLET ORAL at 05:45

## 2024-05-16 RX ADMIN — LIDOCAINE 3 PATCH: 4 CREAM TOPICAL at 11:46

## 2024-05-16 RX ADMIN — OXYCODONE HYDROCHLORIDE 20 MILLIGRAM(S): 5 TABLET ORAL at 18:30

## 2024-05-16 RX ADMIN — LACTULOSE 20 GRAM(S): 10 SOLUTION ORAL at 21:25

## 2024-05-16 RX ADMIN — Medication 1000 MILLIGRAM(S): at 12:00

## 2024-05-16 RX ADMIN — METHOCARBAMOL 750 MILLIGRAM(S): 500 TABLET, FILM COATED ORAL at 05:45

## 2024-05-16 RX ADMIN — ONDANSETRON 4 MILLIGRAM(S): 8 TABLET, FILM COATED ORAL at 11:25

## 2024-05-16 RX ADMIN — OXYCODONE HYDROCHLORIDE 20 MILLIGRAM(S): 5 TABLET ORAL at 06:53

## 2024-05-16 RX ADMIN — Medication 1000 MILLIGRAM(S): at 11:27

## 2024-05-16 RX ADMIN — HYDROMORPHONE HYDROCHLORIDE 1 MILLIGRAM(S): 2 INJECTION INTRAMUSCULAR; INTRAVENOUS; SUBCUTANEOUS at 13:30

## 2024-05-16 RX ADMIN — CARVEDILOL PHOSPHATE 3.12 MILLIGRAM(S): 80 CAPSULE, EXTENDED RELEASE ORAL at 05:47

## 2024-05-16 RX ADMIN — OXYCODONE HYDROCHLORIDE 20 MILLIGRAM(S): 5 TABLET ORAL at 15:13

## 2024-05-16 RX ADMIN — Medication 4: at 17:32

## 2024-05-16 RX ADMIN — HYDROMORPHONE HYDROCHLORIDE 1 MILLIGRAM(S): 2 INJECTION INTRAMUSCULAR; INTRAVENOUS; SUBCUTANEOUS at 12:51

## 2024-05-16 RX ADMIN — HYDROMORPHONE HYDROCHLORIDE 1 MILLIGRAM(S): 2 INJECTION INTRAMUSCULAR; INTRAVENOUS; SUBCUTANEOUS at 01:10

## 2024-05-16 RX ADMIN — Medication 100 MILLIGRAM(S): at 21:25

## 2024-05-16 RX ADMIN — Medication 1 SPRAY(S): at 17:27

## 2024-05-16 RX ADMIN — AMPICILLIN SODIUM AND SULBACTAM SODIUM 200 GRAM(S): 250; 125 INJECTION, POWDER, FOR SUSPENSION INTRAMUSCULAR; INTRAVENOUS at 05:46

## 2024-05-16 RX ADMIN — Medication 12 UNIT(S): at 08:22

## 2024-05-16 RX ADMIN — SENNA PLUS 2 TABLET(S): 8.6 TABLET ORAL at 21:25

## 2024-05-16 RX ADMIN — HYDROMORPHONE HYDROCHLORIDE 1 MILLIGRAM(S): 2 INJECTION INTRAMUSCULAR; INTRAVENOUS; SUBCUTANEOUS at 21:27

## 2024-05-16 RX ADMIN — METHOCARBAMOL 750 MILLIGRAM(S): 500 TABLET, FILM COATED ORAL at 15:13

## 2024-05-16 RX ADMIN — INSULIN GLARGINE 54 UNIT(S): 100 INJECTION, SOLUTION SUBCUTANEOUS at 21:26

## 2024-05-16 RX ADMIN — HYDROMORPHONE HYDROCHLORIDE 1 MILLIGRAM(S): 2 INJECTION INTRAMUSCULAR; INTRAVENOUS; SUBCUTANEOUS at 21:52

## 2024-05-16 RX ADMIN — Medication 6 MILLIGRAM(S): at 21:26

## 2024-05-16 RX ADMIN — AMPICILLIN SODIUM AND SULBACTAM SODIUM 200 GRAM(S): 250; 125 INJECTION, POWDER, FOR SUSPENSION INTRAMUSCULAR; INTRAVENOUS at 11:46

## 2024-05-16 RX ADMIN — OXYCODONE HYDROCHLORIDE 20 MILLIGRAM(S): 5 TABLET ORAL at 15:52

## 2024-05-16 RX ADMIN — HYDROMORPHONE HYDROCHLORIDE 1 MILLIGRAM(S): 2 INJECTION INTRAMUSCULAR; INTRAVENOUS; SUBCUTANEOUS at 00:39

## 2024-05-16 RX ADMIN — ENOXAPARIN SODIUM 60 MILLIGRAM(S): 100 INJECTION SUBCUTANEOUS at 17:26

## 2024-05-16 RX ADMIN — AMPICILLIN SODIUM AND SULBACTAM SODIUM 200 GRAM(S): 250; 125 INJECTION, POWDER, FOR SUSPENSION INTRAMUSCULAR; INTRAVENOUS at 23:42

## 2024-05-16 RX ADMIN — LIDOCAINE 3 PATCH: 4 CREAM TOPICAL at 20:55

## 2024-05-16 RX ADMIN — Medication 1 SPRAY(S): at 05:47

## 2024-05-16 NOTE — PROGRESS NOTE ADULT - PROBLEM SELECTOR PLAN 2
*** Patient can follow up with (pain specialist) Dr. Hay - [phone], upon discharge from hospital ***. Patient has called office and is awaiting insurance verification to schedule appointment. Patient is also amenable to following up with (pain specialist) Dr. Lowell Don who she has seen in the past.    Upon discharge can send home with: 1 week supply of Robaxin 750 mg q 8hrs PRN for muscle spasms, patient should have oxycodone 20mg tablets at home which was dispensed on 5/2, which she can continue. If not, prescribe oxycodone 20mg IR q 8hrs PRN 3 day supply only. Continue Lyrica 100mg q 8hrs.

## 2024-05-16 NOTE — PROGRESS NOTE ADULT - PROBLEM SELECTOR PLAN 3
-s/p lipoma resection 4/15 and [facility]. Checked up on last week, had needle placed in cheek, no further drainage  mild fever at home, now with slightly increased swelling  -c/w Unasyn  -viscous lidocaine q4h PRN  -send blood culture if patient is febrile again   -trend esr and crp  -ID Dr. Short following   -f/u outpatient with Plastic, outpatient plastic NP: Bebeto Hay [phone]

## 2024-05-16 NOTE — PROGRESS NOTE ADULT - TIME BILLING
- Review of records, vital signs and daily labs, Imaging, microbiology and other Infectious Diseases related work up  - General physical examination performed  - Generation of Infectious Diseases treatment plan discussed with attending Physician  - Coordination of care with the multidisciplinary team  -Counseling of the patient and/or family members
- Review of hospital course, labs, vitals, medical records  - Bedside exam and interview  - Discussed plan of care with primary team ACP and housestaff, NP Konstantin Gallego as above  - Documenting the encounter

## 2024-05-16 NOTE — PROGRESS NOTE ADULT - ASSESSMENT
Search Terms: Fuazia Linares, 1970    Search Date: 05/08/2024 09:30:53 AM  The Drug Utilization Report below displays all of the controlled substance prescriptions, if any, that your patient has filled in the last twelve months. The information displayed on this report is compiled from pharmacy submissions to the Department, and accurately reflects the information as submitted by the pharmacies.    This report was requested by: Danni Silva | Reference #: [id number]    Practitioner Count: 5  Pharmacy Count: 2  Current Opioid Prescriptions: 1  Current Benzodiazepine Prescriptions: 0  Current Stimulant Prescriptions: 0      Patient Demographic Information (PDI)       PDI	First Name	Last Name	Birth Date	Gender	Street Address	[facility]	Zip Code  A	Fauzia Linares	1970	Female	[address]	[place]	[address]  B	Fauzia Linares	1970	Female	[address]	[place]	[address]  C	Fauzia Linares	1970	Female	[address]	[place]	[address]  D	Fauzia Linares	1970	Female	[address]	[place] [facility]	[place]	[address]  E	Fauzia Linares	1970	Female	[address]	[facility]	[place]	[address]  F	Fauzia Linares	1970	Female	[address]	[facility]	[place]	[address]  G	Fauzia Linares	1970	Female	[address]	[place]	[address]    Prescription Information      PDI Filter:    PDI	My Rx	Current Rx	Drug Type	Rx Written	Rx Dispensed	Drug	Quantity	Days Supply	Prescriber Name	Prescriber CROW #	Payment Method	Dispenser  A	N	N	O	03/26/2024	03/26/2024	oxycodone hcl (ir) 5 mg tablet	30	5	Quinones, Elizabeth MERLOS	[id number]	[facility]	N	N	O	03/26/2024	03/26/2024	oxycodone hcl (ir) 10 mg tab	30	5	Quinones, Elizabeth MERLOS	[id number]	[facility]  A	N	N	O	03/26/2024	03/26/2024	oxycontin er 10 mg tablet	10	5	Quinones, Elizabeth MERLOS	[id number]	[facility]  B	N	N		10/06/2023	10/07/2023	pregabalin 100 mg capsule	90	30	Tc, Reba	[id number]	Medicare	[facility]  B	N	N	O	09/28/2023	10/01/2023	oxycodone hcl (ir) 15 mg tab	30	5	Tc, Reba	[id number]	Medicare	[facility]  B	N	N	O	09/27/2023	09/27/2023	oxycodone hcl (ir) 15 mg tab	30	8	Tc, Reba	[id number]	Medicare	[facility]  B	N	N	O	09/10/2023	09/10/2023	oxycodone hcl (ir) 15 mg tab	30	5	Tc, Reba	[id number]	Medicare	[facility]  B	N	N		09/06/2023	09/06/2023	pregabalin 100 mg capsule	90	30	Tc, Reba	[id number]	Medicare	[facility]  B	N	N	O	08/27/2023	08/28/2023	oxycodone hcl (ir) 15 mg tab	30	5	Tc, Reba	[id number]	Medicare	[facility]  B	N	N	O	08/12/2023	08/13/2023	oxycodone hcl (ir) 15 mg tab	30	5	Tc, Reba	[id number]	Medicare	[facility]  B	N	N		08/04/2023	08/05/2023	pregabalin 100 mg capsule	90	30	Tc, Reba	[id number]	Medicare	[facility]  B	N	N		07/21/2023	07/22/2023	pregabalin 100 mg capsule	42	14	Tc, Reba	[id number]	Medicare	[facility]  B	N	N	O	07/16/2023	07/16/2023	oxycodone hcl (ir) 15 mg tab	30	7	Tc, Reba	[id number]	Medicare	[facility]  B	N	N	O	06/30/2023	07/01/2023	oxycodone hcl (ir) 15 mg tab	42	10	Tc, Reba	[id number]	Medicare	[facility]  B	N	N		06/19/2023	06/20/2023	pregabalin 100 mg capsule	90	30	Tc, Reba	[id number]	Medicare	[facility]  B	N	N	O	06/06/2023	06/07/2023	oxycodone hcl (ir) 15 mg tab	42	7	Tc, Reba	[id number]	Medicare	[facility]  B	N	N	O	05/22/2023	05/23/2023	oxycodone hcl (ir) 15 mg tab	42	10	Tc, Reba	[id number]	Medicare	[facility]  B	N	N		05/19/2023	05/20/2023	pregabalin 100 mg capsule	90	30	Tc, Reba	[id number]	Medicare	[facility]  C	N	N	O	11/28/2023	11/30/2023	oxycodone hcl (ir) 20 mg tab	60	30	Vinny Maldonado MD	[id number]	Insurance	[id number]  C	N	N	O	11/16/2023	11/18/2023	oxycodone-acetaminophen  mg tab	30	15	Vinny Maldonado MD	[id number]	Insurance	[id number]  C	N	N		11/16/2023	11/18/2023	pregabalin 100 mg capsule	90	30	Vinny Maldonado MD	[id number]	Insurance	[id number]  C	N	N		09/28/2023	11/01/2023	pregabalin 100 mg capsule	30	10	Tc, Reba	[id number]	Insurance	[id number]  D	N	N	O	12/29/2023	12/29/2023	oxycodone hcl (ir) 15 mg tab	12	3	Christopher Montiel	[id number]	Insurance	[facility]  D	N	N		12/29/2023	12/29/2023	butalbital-acetaminophen-caffeine -40 mg tablet	53	8	Christopher Montiel	[id number]	Insurance	[facility]	N	N	O	10/12/2023	10/14/2023	oxycodone hcl (ir) 15 mg tab	28	7	Naomi Ybarra	[id number]	Insurance	[id number]  E	N	N		02/08/2024	02/09/2024	pregabalin 100 mg capsule	42	7	Alba Jasso M	[id number]	Insurance	[facility]  E	N	N	O	02/08/2024	02/09/2024	hydromorphone 4 mg tablet	20	5	Alba Jasso M	[id number]	[facility]  E	N	N		01/25/2024	01/26/2024	pregabalin 100 mg capsule	90	30	Vinny Maldonado MD	[id number]	[facility]	N	N	O	01/25/2024	01/26/2024	oxycodone hcl (ir) 20 mg tab	60	30	Vinny Maldonado MD	[id number]	[facility]	N	Y		04/03/2024	05/06/2024	pregabalin 100 mg capsule	90	30	Vinny Maldonado MD	[id number]	[facility]	N	Y	O	04/30/2024	05/02/2024	oxycodone hcl (ir) 20 mg tab	60	30	Vinny Maldonado MD	[id number]	[facility]	N	N	O	04/16/2024	04/17/2024	oxycodone hcl (ir) 5 mg tablet	10	3	Frank Ba	[id number]	[facility]	N	N		04/03/2024	04/04/2024	pregabalin 100 mg capsule	90	30	Vinny Maldonado MD	[id number]	[facility]	N	N	O	03/04/2024	03/05/2024	hydromorphone 4 mg tablet	20	5	Bonny Bryant	[id number]	[facility]  G	N	N		09/28/2023	10/02/2023	pregabalin 100 mg capsule	30	10	Reba Montez	[id number]	[id number]    * - Details of Drug Type : O = Opioid, B = Benzodiazepine, S = Stimulant    * - Drugs marked with an asterisk are compound drugs. If the compound drug is made up of more than one controlled substance, then each controlled substance will be a separate row in the table.

## 2024-05-16 NOTE — PROGRESS NOTE ADULT - ASSESSMENT
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 Admitted for intractable pain management and right facial swelling. Pt is s/p lipoma resection 4/15 [facility]. Pt had drainage and had needle placed in cheek by outpatient plastic surgeon, no further drainage was noted by plastic surgeon.  CT abdomen/Pelvis showed no obstructive uropathy, but noted for right kidney stone, IUD, moderate stool, right flank spinal stimulator. CT maxillofacial showed  soft tissue swelling with air containing collection overlying the right malar body and the premaxillary space consistent with an air-containing abscess which is new since 7/20/2023. Patient treated with Unasyn. Patient admitted to medicine for complex regional pain syndrome Type 1. Patient pain not improving, will continue with hydromorphone IV as needed,  pain management following.   Pain management recommending outpatient follow up. Patient also can follow up with Dr. Hay- [phone] (pain), upon discharge from hospital.   Patient developed isolated fever on 5/13, no blood culture needed as fever isolated.     5/16, pt complains severe pain overnight, unrelieved with medication and vomited one time. Adjusted pain medication per pain manage specialist.      53 y/o F, from home, lives alone, ambulates with cane/walker/wheelchair, with PMHx of DM, HTN, HLD, CHF, complex regional pain syndrome of right back intercostal muscles s/p spinal cord stimulator (Mar 2023) who presents with right flank pain and right facial swelling. Pt is s/p lipoma resection 4/15 [facility]. pt had drainage and had needle placed in cheek by outpatient plastic surgeon, no further drainage was noted by plastic surgeon.   Patient admitted to medicine for complex regional pain syndrome Type 1 and right facial swelling due to periodontal abscess. Pain management and ID consulted.   CT abdomen/Pelvis showed no obstructive uropathy, but noted for right kidney stone, IUD, moderate stool, right flank spinal stimulator. CT maxillofacial showed  soft tissue swelling with air containing collection overlying the right malar body and the premaxillary space consistent with an air-containing abscess which is new since 7/20/2023.  Pt was treated IV unasyn, ID recs to switch to Augmentin total 14 days course. Pain management following. R flank pain poorly controlled, requiring IV hydromorphone as needed, Titrated to po pain medications.      Pain management recommending outpatient follow up. Patient also can follow up with Dr. Hay- [phone] (pain), upon discharge from hospital.   Patient developed isolated fever on 5/13, no blood culture needed as fever isolated.     5/16, pt complains uncontrolled R flank pain overnight, unrelieved with medication and vomited one time. Adjusted pain medication per pain manage specialist.

## 2024-05-16 NOTE — PROGRESS NOTE ADULT - SUBJECTIVE AND OBJECTIVE BOX
NP Note discussed with  Primary Attending    INTERVAL HPI/OVERNIGHT EVENTS: seen at bedside, pt states her pain get worsening since last night. + vomiting this morning.     MEDICATIONS  (STANDING):  ampicillin/sulbactam  IVPB 3 Gram(s) IV Intermittent every 6 hours  ampicillin/sulbactam  IVPB      atorvastatin 20 milliGRAM(s) Oral at bedtime  buMETAnide 2 milliGRAM(s) Oral daily  carvedilol 3.125 milliGRAM(s) Oral every 12 hours  enoxaparin Injectable 60 milliGRAM(s) SubCutaneous every 12 hours  fluticasone propionate 50 MICROgram(s)/spray Nasal Spray 1 Spray(s) Both Nostrils two times a day  insulin glargine Injectable (LANTUS) 54 Unit(s) SubCutaneous at bedtime  insulin lispro (ADMELOG) corrective regimen sliding scale   SubCutaneous three times a day before meals  insulin lispro (ADMELOG) corrective regimen sliding scale   SubCutaneous at bedtime  insulin lispro Injectable (ADMELOG) 12 Unit(s) SubCutaneous three times a day before meals  lidocaine   4% Patch 3 Patch Transdermal daily  melatonin 6 milliGRAM(s) Oral at bedtime  methocarbamol 750 milliGRAM(s) Oral every 8 hours  montelukast 10 milliGRAM(s) Oral at bedtime  pantoprazole    Tablet 40 milliGRAM(s) Oral before breakfast  senna 2 Tablet(s) Oral at bedtime    MEDICATIONS  (PRN):  acetaminophen     Tablet .. 1000 milliGRAM(s) Oral every 8 hours PRN Moderate Pain (4 - 6)  albuterol    90 MICROgram(s) HFA Inhaler 2 Puff(s) Inhalation every 6 hours PRN Bronchospasm  aluminum hydroxide/magnesium hydroxide/simethicone Suspension 30 milliLiter(s) Oral every 4 hours PRN Dyspepsia  HYDROmorphone  Injectable 1 milliGRAM(s) IV Push every 6 hours PRN Severe Pain (7 - 10)  lactulose Syrup 20 Gram(s) Oral three times a day PRN Constipation  ondansetron Injectable 4 milliGRAM(s) IV Push every 8 hours PRN Nausea and/or Vomiting  oxyCODONE    IR 20 milliGRAM(s) Oral every 8 hours PRN Severe Pain (7 - 10)      __________________________________________________  REVIEW OF SYSTEMS:    CONSTITUTIONAL: No fever, right flank pain 12/10  EYES: no acute visual disturbances  NECK: No pain or stiffness  RESPIRATORY: No cough; No shortness of breath  CARDIOVASCULAR: No chest pain, no palpitations  GASTROINTESTINAL: No pain. +  nausea or vomiting; No diarrhea   NEUROLOGICAL: No headache or numbness, no tremors  MUSCULOSKELETAL: No joint pain, no muscle pain  GENITOURINARY: no dysuria, no frequency, no hesitancy  PSYCHIATRY: no depression , no anxiety  ALL OTHER  ROS negative        Vital Signs Last 24 Hrs  T(C): 36.5 (16 May 2024 13:58), Max: 36.8 (15 May 2024 20:28)  T(F): 97.7 (16 May 2024 13:58), Max: 98.3 (15 May 2024 20:28)  HR: 67 (16 May 2024 13:58) (58 - 67)  BP: 126/52 (16 May 2024 13:58) (126/52 - 136/62)  BP(mean): 80 (15 May 2024 20:28) (80 - 80)  RR: 18 (16 May 2024 13:58) (17 - 18)  SpO2: 93% (16 May 2024 13:58) (93% - 100%)    Parameters below as of 16 May 2024 13:58  Patient On (Oxygen Delivery Method): room air        ________________________________________________  PHYSICAL EXAM:  GENERAL: NAD, morbidly obese.   HEENT: Normocephalic;  conjunctivae and sclerae clear; moist mucous membranes;   NECK : supple  CHEST/LUNG: Clear to auscultation bilaterally with good air entry   HEART: S1 S2  regular; no murmurs, gallops or rubs  ABDOMEN: Soft, obese, Nontender, Nondistended; Bowel sounds present  EXTREMITIES: no cyanosis; + lower leg edema; no calf tenderness  SKIN: warm and dry; no rash  NERVOUS SYSTEM:  Awake and alert; Oriented  to place, person and time ; no new deficits    _________________________________________________  LABS:                        11.0   8.62  )-----------( 307      ( 15 May 2024 06:10 )             34.5     05-15    140  |  105  |  12  ----------------------------<  250<H>  3.6   |  29  |  0.91    Ca    8.4      15 May 2024 06:10        Urinalysis Basic - ( 15 May 2024 06:10 )    Color: x / Appearance: x / SG: x / pH: x  Gluc: 250 mg/dL / Ketone: x  / Bili: x / Urobili: x   Blood: x / Protein: x / Nitrite: x   Leuk Esterase: x / RBC: x / WBC x   Sq Epi: x / Non Sq Epi: x / Bacteria: x      CAPILLARY BLOOD GLUCOSE      POCT Blood Glucose.: 167 mg/dL (16 May 2024 11:14)  POCT Blood Glucose.: 283 mg/dL (16 May 2024 08:02)  POCT Blood Glucose.: 278 mg/dL (15 May 2024 21:16)  POCT Blood Glucose.: 268 mg/dL (15 May 2024 16:40)        RADIOLOGY & ADDITIONAL TESTS:    Imaging  Reviewed:  YES    < from: CT Abdomen and Pelvis No Cont (05.08.24 @ 06:31) >    IMPRESSION:  No evidence for obstructive uropathy.      < end of copied text >  < from: CT Maxillofacial w/ IV Cont (05.08.24 @ 14:20) >    ACC: [id number] EXAM:  CT MAXILLOFACIAL  IC   ORDERED BY: BEN HANKS     PROCEDURE DATE:  05/08/2024          INTERPRETATION:  Clinical indication: Cheeks swelling.    Serial thin sections on a multi slice scanner were obtained through the   orbits and paranasal sinuses after contrast infusion with sagittal and   coronal computer-generated reconstructed views. 90 cc of Omnipaque 350   were intravenously injected, 10 cc were discarded.    Comparison is made with the prior maxillofacial CT of7/20/2023.    There is soft tissue swelling overlying the maxilla with inflammatory   changes in the fat. There is a air containing collection within this area   of soft tissue swelling. The collection measures 2.1 cm in AP diameter by   2.6 cm transversely by 2.8 cm in craniocaudal diameter. The air   collection measures 1.6 cm in AP diameter by 2 cm transversely by 2.8 cm   in craniocaudal diameter. This is consistent with an abscess with air   which may be iatrogenic.    There is no adjacent bone destruction. There is no adjacent periodontal   disease. There is a retention cyst or polyp in the right maxillary sinus.   The malar body and zygomatic arch is intact. The nasal septum is midline.   The left maxillary sinus and ethmoid sphenoid and frontal sinuses are   clear.    The visualized vascular structures are intact. The uvula is normal, the   epiglottis is normal. The visualized salivary glands are normal.    IMPRESSION: Soft tissue swelling with air containing collection overlying   the right malar body and the premaxillary space consistent with an   air-containing abscess which is new since 7/20/2023.    --- End of Report ---            ELMA SIDHU MD; Attending Radiologist  This document has been electronically signed. May8 2024  4:35PM    < end of copied text >  Consultant(s) Notes Reviewed:   YES      Plan of care was discussed with patient and /or primary care giver; all questions and concerns were addressed

## 2024-05-16 NOTE — CHART NOTE - NSCHARTNOTEFT_GEN_A_CORE
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  Pt with chronic neuropathic pain right flank.  Pt with DRG scs.  On chronic opioids at home - more recently oxycodone 15mg po q 8 hours.  Pt being tx for soft tissue swelling s/p recent sx.    For mgt of chronic pain:    - oxycodone 20mg po q 8 hours atc  - titrate off hydromorphone iv tomorrow  - continue Lyrica 100mg po q 8 hours (fell off)  - continue Robaxin  - follow up with Dr. Lowell Don (pain mgt)  - continue stoof softeners  - pt would benefit from iv ketamine infusions - not done in-house - will refer her to a pain specialist  - Pt to follow up with dr. hager.     Plan discussed with pt.

## 2024-05-16 NOTE — PROGRESS NOTE ADULT - SUBJECTIVE AND OBJECTIVE BOX
Source of information: ROBIN LÓPEZ, Chart review  Patient language: English  : n/a    HPI:  This is a 55 y/o F, from home, lives alone, ambulates with cane/walker/wheelchair, with PMHx of DM, HTN, HLD, CHF, complex regional pain syndrome of right back intercostal muscles s/p spinal cord stimulator (Mar 2023) who presents with right flank pain. Pt states she is having a flare up of her complex regional pain syndrome that started 2 days ago. Pt mentions she ran out of her pain medications and has been taking up to six 800mg Ibuprofen daily. Pt does not follow with a pain specialist currently. Pt also states she had a recent lipoma resection from her right cheek (4/15/2024) after which she completed a course of antibiotics. Pt states she was having drainage and went to see her plastic surgeon on May 2nd, after which they placed a needle in the cheek and drained out fluid. Pt states that since that time she has not had any further fluid drain. Pt mentions she now has pain and slightly increased swelling in the cheek area. Pt mentions she had a 100.6 fever this morning and felt chills. Pt denies any recent travel, recent illness, dysuria, CP, SOB, N/V/D, or constipation. (08 May 2024 09:45)      This is a Patient is a 54y old  Female who presents with a chief complaint of Complex Regional Pain syndrome flare (15 May 2024 12:20)    Pt is admitted for intractable right flank pain. Pain service consulted on 5/8 for management of right flank pain. Per patient, she was diagnosed with complex regional pain syndrome and right sided intercostal neuralgia in 2016. Since her diagnosis she has trialed numerous medications/interventions without relief, including (amitriptyline and duloxetine which caused patti, max dose of gabapentin, and epidural spinal injections). She was recommended a spinal cord stimulator which was placed in March 2023, however she reports that it is not effective in managing her symptoms. She does not currently have an outpatient pain management provider and has been relying on her PCP to prescribe opioids to manage her pain. Patient also present with right facial swelling/droop since 4/24 after removal of right cheek mass. CT Maxillofacial demonstrates soft tissue swelling with air containing collection overlying the right malar body and the premaxillary space consistent with an air-containing abscess which is new since 7/20/2023.     Pt seen and examined this morning, while laying in bed. Reports pain score 12/10 to right flank and right low back. SCALE USED: (1-10 VNRS). Pt continues to describe pain as constant, sharp, burning, and aching in quality, also reports intermittent muscle spasms. Patient states that "Dilaudid IV" is the only medication that works for her during a CRPS flare. Pt states that she is frustrated with discussions with multiple providers to explain her pain history. Pt reports that in 2022 she underwent implantation of a pain stimulator to treat the right intercostal pain at [facility].  She states that she had acute respiratory distress after she had a punctured lung and ended up in a coma for 8 days with the surgery.  The pain management physician was Dr. Deirdre Blankenship however she said she did not follow up with this physician after her experience and since then she has not been able to establish care with a pain management provider as they feel uncomfortable prescribing pain meds. The pain is mildly alleviated with current regimen and is exacerbated by movement and taking deep inhalations. States that her baseline pain is 12/10 at which she is able to participate in daily activities however the Dilaudid IV brings her pain down to 8/10 at which she is more comfortable. Pt tolerating PO diet. Denies lethargy, chest pain, SOB, nausea, vomiting. She reports history of constipation. Last BM 5/15 - states that she takes [facility] outpatient. Patient stated goal for pain control: to be able to take deep breaths, get out of bed to chair and ambulate with tolerable pain control. Patient ambulates with rollator/straight cane at baseline and uses wheelchair for long distances. Per patient, her most recent pain regimen includes Xanaflex 4mg TID, Lyrica 100mg TID, and Oxycodone 20mg BID. Pt also reports being admitted to [facility] on 3/2024 with same symptoms and flare where she was treated with Dilaudid IV and Ketamine IV to control acute pain and then DC'd home. To note, director of pain services contacted pain specialist Dr. Lowell Don at [facility] who has seen patient in the past. Patient is amenable to seeing Dr. Florencia again and reports missing previous follow up appointments due to frequent hospitalizations.     PAST MEDICAL & SURGICAL HISTORY:    HTN (hypertension)    Neuropathy    Obesity    Former cigarette smoker  (smoked x 45 years; quit ~2013)    Marijuana smoker, continuous  (states smokes 3 - 4 times per day for pain management reasons)    Neuralgia  (pt states hx/o "intercostal neuralgia")    Diabetes    Essential hypertension    IBS (irritable bowel syndrome)    Complex regional pain syndrome type 1    DM2 (diabetes mellitus, type 2)    Intercostal neuralgia    Localized swelling, mass and lump, head    Asthma    History of CHF (congestive heart failure)    Fibroids    Idiopathic intracranial hypertension    History of cholecystectomy    History of hand surgery  (pt states she had 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12)    Spinal cord neurostimulator device in situ    History of removal of cyst    FAMILY HISTORY:  FH: HTN (hypertension)    Social History:  Former smoker, quit in 2014, up to 1 PPD for 35 years  denies EtOH use (08 May 2024 09:45)  [X]Denies ETOH use, illicit drug use, and smoking     Marijuana smoker, continuous  (states smokes 3 - 4 times per day for pain management reasons)    Allergies  amitriptyline (Other)  duloxetine (Other)    MEDICATIONS  (STANDING):  ampicillin/sulbactam  IVPB 3 Gram(s) IV Intermittent every 6 hours  ampicillin/sulbactam  IVPB      atorvastatin 20 milliGRAM(s) Oral at bedtime  buMETAnide 2 milliGRAM(s) Oral daily  carvedilol 3.125 milliGRAM(s) Oral every 12 hours  enoxaparin Injectable 60 milliGRAM(s) SubCutaneous every 12 hours  fluticasone propionate 50 MICROgram(s)/spray Nasal Spray 1 Spray(s) Both Nostrils two times a day  insulin glargine Injectable (LANTUS) 54 Unit(s) SubCutaneous at bedtime  insulin lispro (ADMELOG) corrective regimen sliding scale   SubCutaneous three times a day before meals  insulin lispro (ADMELOG) corrective regimen sliding scale   SubCutaneous at bedtime  insulin lispro Injectable (ADMELOG) 12 Unit(s) SubCutaneous three times a day before meals  lidocaine   4% Patch 3 Patch Transdermal daily  melatonin 6 milliGRAM(s) Oral at bedtime  methocarbamol 750 milliGRAM(s) Oral every 8 hours  montelukast 10 milliGRAM(s) Oral at bedtime  pantoprazole    Tablet 40 milliGRAM(s) Oral before breakfast  senna 2 Tablet(s) Oral at bedtime    MEDICATIONS  (PRN):  acetaminophen     Tablet .. 650 milliGRAM(s) Oral every 6 hours PRN Temp greater or equal to 38C (100.4F), Mild Pain (1 - 3)  acetaminophen     Tablet .. 1000 milliGRAM(s) Oral every 8 hours PRN Moderate Pain (4 - 6)  albuterol    90 MICROgram(s) HFA Inhaler 2 Puff(s) Inhalation every 6 hours PRN Bronchospasm  aluminum hydroxide/magnesium hydroxide/simethicone Suspension 30 milliLiter(s) Oral every 4 hours PRN Dyspepsia  HYDROmorphone  Injectable 1 milliGRAM(s) IV Push every 6 hours PRN Severe Pain (7 - 10)  lactulose Syrup 20 Gram(s) Oral three times a day PRN Constipation  ondansetron Injectable 4 milliGRAM(s) IV Push every 8 hours PRN Nausea and/or Vomiting  oxyCODONE    IR 20 milliGRAM(s) Oral every 8 hours PRN Severe Pain (7 - 10)    Vital Signs Last 24 Hrs  T(C): 36.5 (16 May 2024 13:58), Max: 36.8 (15 May 2024 20:28)  T(F): 97.7 (16 May 2024 13:58), Max: 98.3 (15 May 2024 20:28)  HR: 67 (16 May 2024 13:58) (58 - 67)  BP: 126/52 (16 May 2024 13:58) (126/52 - 136/62)  BP(mean): 80 (15 May 2024 20:28) (80 - 80)  RR: 18 (16 May 2024 13:58) (17 - 18)  SpO2: 93% (16 May 2024 13:58) (93% - 100%)    Parameters below as of 16 May 2024 13:58  Patient On (Oxygen Delivery Method): room air    SARS-CoV-2: NotDetec (25 Mar 2024 15:31)  SARS-CoV-2: NotDetec (08 Dec 2023 01:28)    LABS: Reviewed                        11.0   8.62  )-----------( 307      ( 15 May 2024 06:10 )             34.5     05-15    140  |  105  |  12  ----------------------------<  250<H>  3.6   |  29  |  0.91    Ca    8.4      15 May 2024 06:10    Urinalysis Basic - ( 15 May 2024 06:10 )    Color: x / Appearance: x / SG: x / pH: x  Gluc: 250 mg/dL / Ketone: x  / Bili: x / Urobili: x   Blood: x / Protein: x / Nitrite: x   Leuk Esterase: x / RBC: x / WBC x   Sq Epi: x / Non Sq Epi: x / Bacteria: x    CAPILLARY BLOOD GLUCOSE    POCT Blood Glucose.: 167 mg/dL (16 May 2024 11:14)  POCT Blood Glucose.: 283 mg/dL (16 May 2024 08:02)  POCT Blood Glucose.: 278 mg/dL (15 May 2024 21:16)  POCT Blood Glucose.: 268 mg/dL (15 May 2024 16:40)    SARS-CoV-2: NotDetec (25 Mar 2024 15:31)  SARS-CoV-2: NotDetec (08 Dec 2023 01:28)    Radiology: Reviewed    < from: CT Abdomen and Pelvis No Cont (05.08.24 @ 06:31) >    ACC: [id number] EXAM:  CT ABDOMEN AND PELVIS   ORDERED BY: TRACE ROYAL     PROCEDURE DATE:  05/08/2024      INTERPRETATION:  CLINICAL INFORMATION: Right flank pain    COMPARISON: CT abdomen pelvis 1/22/2023    CONTRAST/COMPLICATIONS:  IV Contrast: None  Oral Contrast: None  Complications: None    PROCEDURE:  CT of the Abdomen and Pelvis was performed.  Sagittal and coronal reformats were performed.    FINDINGS:  LOWER CHEST: Aortic root, coronary artery, mitral annular calcifications.   Linear subsegmental atelectasis in the lingula.    LIVER: Within normal limits.  BILE DUCTS: Normal caliber.  GALLBLADDER: Cholecystectomy.  SPLEEN: Within normal limits.  PANCREAS: Within normal limits.  ADRENALS: Within normal limits.  KIDNEYS/URETERS: Punctate nonobstructing stone in the interpolar region   of the right kidney. No hydronephrosis or perinephric inflammation.    BLADDER: Within normal limits.  REPRODUCTIVE ORGANS: Intrauterine device. No gross adnexal masses.    BOWEL: Duodenal diverticulum. No bowel obstruction. Appendix is normal.   Moderate stool in the rectosigmoid colon. No bowel wall thickening or   pericolonic inflammatory change.  PERITONEUM: No ascites.  VESSELS: Mild atherosclerotic changes.  RETROPERITONEUM/LYMPH NODES: No lymphadenopathy.  ABDOMINAL WALL: Small fat-containing umbilical hernia. Spinal stimulator   generator in the right flank subcutaneous soft tissues. Mild diffuse   anasarca.  BONES: Degenerative changes. Spinal stimulator electrodes.    IMPRESSION:  No evidence for obstructive uropathy.      --- End of Report ---    MILE COSTELLO MD; Attending Radiologist  This document has been electronically signed. May  8 2024  6:54AM    < end of copied text >  < from: Xray Chest 2 Views PA/Lat (05.08.24 @ 01:26) >    ACC: [id number] EXAM:  XR CHEST PA LAT 2V   ORDERED BY: TRACE ROYAL     PROCEDURE DATE:  05/08/2024      INTERPRETATION:  EXAM: XR CHEST PA AND LATERAL    INDICATION: R flan kpain HXR    COMPARISON: March 23, 2024    IMPRESSION: Some linear scarring and/or platelike atelectatic change in   the left and right chest. Some slight prominence to interstitial markings   in the right infrahilar region which could represent an inflammatory   infectious process and/or atelectatic change suggested on the frontal   view. Heart is within normal limits in its transthoracic diameter.   Regional osseous structures appropriate for age    --- End of Report ---    MARK BECK MD; Attending Radiologist  This document has been electronically signed. May  8 2024  9:00AM    < end of copied text >      ORT Score -   Family Hx of substance abuse	Female	      Male  Alcohol 	                                           1                     3  Illegal drugs	                                   2                     3  Rx drugs                                           4 	                  4  Personal Hx of substance abuse		  Alcohol 	                                          3	                  3  Illegal drugs                                     4	                  4  Rx drugs                                            5 	                  5  Age between 16- 45 years	           1                     1  hx preadolescent sexual abuse	   3 	                  0  Psychological disease		  ADD, OCD, bipolar, schizophrenia   2	          2  Depression                                           1 	          1  Total: 0    a score of 3 or lower indicates low risk for opioid abuse		  a score of 4-7 indicates moderate risk for opioid abuse		  a score of 8 or higher indicates high risk for opioid abuse    REVIEW OF SYSTEMS:  CONSTITUTIONAL: No fever or fatigue  HEENT:  No difficulty hearing, no change in vision  NECK: No pain or stiffness  RESPIRATORY: No cough, wheezing, chills or hemoptysis; No shortness of breath  CARDIOVASCULAR: No chest pain, palpitations, dizziness, or leg swelling  GASTROINTESTINAL: No loss of appetite, decreased PO intake. No abdominal or epigastric pain. No nausea, vomiting; No diarrhea or constipation.   GENITOURINARY: No dysuria, frequency, hematuria, retention or incontinence  MUSCULOSKELETAL: + right flank pain; + right upper and lower back pain, chronic upper or lower motor strength weakness due to pain, no saddle anesthesia, bowel/bladder incontinence, no falls   NEURO: No headaches, + numbness/tingling b/l LE, No weakness  ENDOCRINE: No polyuria, polydipsia, heat or cold intolerance; No hair loss  PSYCHIATRIC: No depression, anxiety or difficulty sleeping    PHYSICAL EXAM:  GENERAL:  Alert & Oriented X 4, cooperative, NAD, Good concentration. Speech is clear, (right facial droop, since 4/2024 after surgical removal of right cheek mass.)  RESPIRATORY: Respirations even and unlabored. Clear to auscultation bilaterally  CARDIOVASCULAR: Normal S1/S2, regular rate and rhythm  GASTROINTESTINAL:  Soft, obese per BMI, right flank tenderness, bowel sounds present  PERIPHERAL VASCULAR: Extremities warm without edema. 2+ Peripheral Pulses, No cyanosis, No calf tenderness  MUSCULOSKELETAL: Motor Strength 4/5 B/L upper and lower extremities; moves all extremities equally against gravity; + right upper and lower back tenderness  SKIN: Warm, dry, intact, spinal cord stimulator battery palpable to right low back, mid back scar from spinal cord stimulator insertion    Risk factors associated with adverse outcomes related to opioid treatment  [ ]  Concurrent benzodiazepine use  [ ]  History/ Active substance use or alcohol use disorder  [ ] Psychiatric co-morbidity  [ ] Sleep apnea  [ ] COPD  [x] BMI> 35  [ ] Liver dysfunction  [ ] Renal dysfunction  [ ] CHF  [x] Former smoker  [ ]  Age > 60 years    [x]  [place]  Reviewed and Copied to Chart. See below.    Plan of care and goal oriented pain management treatment options were discussed with patient and /or primary care giver; all questions and concerns were addressed and care was aligned with patient's wishes.    Educated patient on goal oriented pain management treatment options     05-16-24 @ 14:38

## 2024-05-16 NOTE — PROGRESS NOTE ADULT - PROBLEM SELECTOR PLAN 1
Pt with chronic right flank pain which is somatic and neuropathic in nature due to history of complex regional pain syndrome and right intercostal neuralgia. Since diagnosis pt has tried numerous medications/interventions without relief, including (Amitriptyline and Duloxetine which caused patti, max dose of Gabapentin, and epidural spinal injections). She was recommended a spinal cord stimulator which was placed in 3/23, however she reports that it is not effective in managing her symptoms. She does not currently have an outpatient pain management provider and has been relying on her PCP to prescribe opioids to manage her pain.   Opioid pain recommendations   - Recommend discontinuing Dilaudid 1 mg IVP q6h PRN for severe pain. (In preparation for discharge home patient needs PO regimen )   - Continue oxycodone 20mg PO q 8hrs PRN for severe pain. (patient's most recent home dose, verified on iSTOP) Monitor for respiratory depression/sedation.  Non-opioid pain recommendations   - Continue Robaxin 750 mg po q8h x 5 days. Monitor for drowsiness.  - Continue Tylenol 1g q 8hrs PRN for moderate pain.   - Continue Lyrica 100mg q 8hrs. (home med)  - Continue Lidoderm 4% patch daily. x 3 (12 hrs on/12 hrs off)  Bowel Regimen  - Continue Miralax 17G PO daily   - Continue Senna 2 tablets at bedtime for constipation  Mild pain (score 1-3)  - Non-pharmacological pain treatment recommendations  - Warm/ Cool packs PRN   - Repositioning extremity, elevation, imagery, relaxation, distraction.  - Physical therapy OOB if no contraindications   Recommendations discussed with primary team and RN.

## 2024-05-17 LAB
ANION GAP SERPL CALC-SCNC: 6 MMOL/L — SIGNIFICANT CHANGE UP (ref 5–17)
BUN SERPL-MCNC: 11 MG/DL — SIGNIFICANT CHANGE UP (ref 7–18)
CALCIUM SERPL-MCNC: 8.3 MG/DL — LOW (ref 8.4–10.5)
CHLORIDE SERPL-SCNC: 102 MMOL/L — SIGNIFICANT CHANGE UP (ref 96–108)
CO2 SERPL-SCNC: 31 MMOL/L — SIGNIFICANT CHANGE UP (ref 22–31)
CREAT SERPL-MCNC: 0.93 MG/DL — SIGNIFICANT CHANGE UP (ref 0.5–1.3)
EGFR: 73 ML/MIN/1.73M2 — SIGNIFICANT CHANGE UP
GLUCOSE BLDC GLUCOMTR-MCNC: 164 MG/DL — HIGH (ref 70–99)
GLUCOSE BLDC GLUCOMTR-MCNC: 171 MG/DL — HIGH (ref 70–99)
GLUCOSE BLDC GLUCOMTR-MCNC: 172 MG/DL — HIGH (ref 70–99)
GLUCOSE BLDC GLUCOMTR-MCNC: 176 MG/DL — HIGH (ref 70–99)
GLUCOSE SERPL-MCNC: 159 MG/DL — HIGH (ref 70–99)
HCT VFR BLD CALC: 34.8 % — SIGNIFICANT CHANGE UP (ref 34.5–45)
HGB BLD-MCNC: 10.8 G/DL — LOW (ref 11.5–15.5)
MCHC RBC-ENTMCNC: 28.9 PG — SIGNIFICANT CHANGE UP (ref 27–34)
MCHC RBC-ENTMCNC: 31 GM/DL — LOW (ref 32–36)
MCV RBC AUTO: 93 FL — SIGNIFICANT CHANGE UP (ref 80–100)
NRBC # BLD: 0 /100 WBCS — SIGNIFICANT CHANGE UP (ref 0–0)
PLATELET # BLD AUTO: 363 K/UL — SIGNIFICANT CHANGE UP (ref 150–400)
POTASSIUM SERPL-MCNC: 3.3 MMOL/L — LOW (ref 3.5–5.3)
POTASSIUM SERPL-SCNC: 3.3 MMOL/L — LOW (ref 3.5–5.3)
RBC # BLD: 3.74 M/UL — LOW (ref 3.8–5.2)
RBC # FLD: 13.5 % — SIGNIFICANT CHANGE UP (ref 10.3–14.5)
SODIUM SERPL-SCNC: 139 MMOL/L — SIGNIFICANT CHANGE UP (ref 135–145)
WBC # BLD: 9.49 K/UL — SIGNIFICANT CHANGE UP (ref 3.8–10.5)
WBC # FLD AUTO: 9.49 K/UL — SIGNIFICANT CHANGE UP (ref 3.8–10.5)

## 2024-05-17 PROCEDURE — 99233 SBSQ HOSP IP/OBS HIGH 50: CPT

## 2024-05-17 PROCEDURE — 99232 SBSQ HOSP IP/OBS MODERATE 35: CPT

## 2024-05-17 RX ORDER — METHOCARBAMOL 500 MG/1
750 TABLET, FILM COATED ORAL EVERY 8 HOURS
Refills: 0 | Status: DISCONTINUED | OUTPATIENT
Start: 2024-05-17 | End: 2024-05-18

## 2024-05-17 RX ORDER — POTASSIUM CHLORIDE 20 MEQ
40 PACKET (EA) ORAL ONCE
Refills: 0 | Status: COMPLETED | OUTPATIENT
Start: 2024-05-17 | End: 2024-05-17

## 2024-05-17 RX ORDER — HYDROMORPHONE HYDROCHLORIDE 2 MG/ML
1 INJECTION INTRAMUSCULAR; INTRAVENOUS; SUBCUTANEOUS ONCE
Refills: 0 | Status: DISCONTINUED | OUTPATIENT
Start: 2024-05-17 | End: 2024-05-17

## 2024-05-17 RX ADMIN — CARVEDILOL PHOSPHATE 3.12 MILLIGRAM(S): 80 CAPSULE, EXTENDED RELEASE ORAL at 05:14

## 2024-05-17 RX ADMIN — Medication 12 UNIT(S): at 12:27

## 2024-05-17 RX ADMIN — HYDROMORPHONE HYDROCHLORIDE 1 MILLIGRAM(S): 2 INJECTION INTRAMUSCULAR; INTRAVENOUS; SUBCUTANEOUS at 18:58

## 2024-05-17 RX ADMIN — ONDANSETRON 4 MILLIGRAM(S): 8 TABLET, FILM COATED ORAL at 05:27

## 2024-05-17 RX ADMIN — AMPICILLIN SODIUM AND SULBACTAM SODIUM 200 GRAM(S): 250; 125 INJECTION, POWDER, FOR SUSPENSION INTRAMUSCULAR; INTRAVENOUS at 11:35

## 2024-05-17 RX ADMIN — HYDROMORPHONE HYDROCHLORIDE 1 MILLIGRAM(S): 2 INJECTION INTRAMUSCULAR; INTRAVENOUS; SUBCUTANEOUS at 19:26

## 2024-05-17 RX ADMIN — Medication 1 SPRAY(S): at 05:15

## 2024-05-17 RX ADMIN — LACTULOSE 20 GRAM(S): 10 SOLUTION ORAL at 11:34

## 2024-05-17 RX ADMIN — MONTELUKAST 10 MILLIGRAM(S): 4 TABLET, CHEWABLE ORAL at 21:54

## 2024-05-17 RX ADMIN — CARVEDILOL PHOSPHATE 3.12 MILLIGRAM(S): 80 CAPSULE, EXTENDED RELEASE ORAL at 17:38

## 2024-05-17 RX ADMIN — Medication 2: at 18:01

## 2024-05-17 RX ADMIN — Medication 100 MILLIGRAM(S): at 13:49

## 2024-05-17 RX ADMIN — PANTOPRAZOLE SODIUM 40 MILLIGRAM(S): 20 TABLET, DELAYED RELEASE ORAL at 05:14

## 2024-05-17 RX ADMIN — LIDOCAINE 3 PATCH: 4 CREAM TOPICAL at 00:29

## 2024-05-17 RX ADMIN — Medication 600 MILLIGRAM(S): at 17:40

## 2024-05-17 RX ADMIN — Medication 2: at 09:29

## 2024-05-17 RX ADMIN — Medication 1 SPRAY(S): at 17:39

## 2024-05-17 RX ADMIN — SENNA PLUS 2 TABLET(S): 8.6 TABLET ORAL at 21:53

## 2024-05-17 RX ADMIN — Medication 40 MILLIEQUIVALENT(S): at 09:31

## 2024-05-17 RX ADMIN — Medication 2: at 12:26

## 2024-05-17 RX ADMIN — AMPICILLIN SODIUM AND SULBACTAM SODIUM 200 GRAM(S): 250; 125 INJECTION, POWDER, FOR SUSPENSION INTRAMUSCULAR; INTRAVENOUS at 23:53

## 2024-05-17 RX ADMIN — HYDROMORPHONE HYDROCHLORIDE 1 MILLIGRAM(S): 2 INJECTION INTRAMUSCULAR; INTRAVENOUS; SUBCUTANEOUS at 12:15

## 2024-05-17 RX ADMIN — LIDOCAINE 3 PATCH: 4 CREAM TOPICAL at 23:41

## 2024-05-17 RX ADMIN — INSULIN GLARGINE 54 UNIT(S): 100 INJECTION, SOLUTION SUBCUTANEOUS at 21:53

## 2024-05-17 RX ADMIN — Medication 12 UNIT(S): at 18:02

## 2024-05-17 RX ADMIN — METHOCARBAMOL 750 MILLIGRAM(S): 500 TABLET, FILM COATED ORAL at 05:13

## 2024-05-17 RX ADMIN — ENOXAPARIN SODIUM 60 MILLIGRAM(S): 100 INJECTION SUBCUTANEOUS at 05:13

## 2024-05-17 RX ADMIN — ATORVASTATIN CALCIUM 20 MILLIGRAM(S): 80 TABLET, FILM COATED ORAL at 21:54

## 2024-05-17 RX ADMIN — OXYCODONE HYDROCHLORIDE 20 MILLIGRAM(S): 5 TABLET ORAL at 17:38

## 2024-05-17 RX ADMIN — ONDANSETRON 4 MILLIGRAM(S): 8 TABLET, FILM COATED ORAL at 14:26

## 2024-05-17 RX ADMIN — Medication 12 UNIT(S): at 09:29

## 2024-05-17 RX ADMIN — HYDROMORPHONE HYDROCHLORIDE 1 MILLIGRAM(S): 2 INJECTION INTRAMUSCULAR; INTRAVENOUS; SUBCUTANEOUS at 04:52

## 2024-05-17 RX ADMIN — Medication 6 MILLIGRAM(S): at 21:54

## 2024-05-17 RX ADMIN — OXYCODONE HYDROCHLORIDE 20 MILLIGRAM(S): 5 TABLET ORAL at 01:47

## 2024-05-17 RX ADMIN — Medication 100 MILLIGRAM(S): at 21:54

## 2024-05-17 RX ADMIN — HYDROMORPHONE HYDROCHLORIDE 1 MILLIGRAM(S): 2 INJECTION INTRAMUSCULAR; INTRAVENOUS; SUBCUTANEOUS at 11:33

## 2024-05-17 RX ADMIN — OXYCODONE HYDROCHLORIDE 20 MILLIGRAM(S): 5 TABLET ORAL at 09:30

## 2024-05-17 RX ADMIN — OXYCODONE HYDROCHLORIDE 20 MILLIGRAM(S): 5 TABLET ORAL at 18:08

## 2024-05-17 RX ADMIN — OXYCODONE HYDROCHLORIDE 20 MILLIGRAM(S): 5 TABLET ORAL at 03:01

## 2024-05-17 RX ADMIN — Medication 100 MILLIGRAM(S): at 05:13

## 2024-05-17 RX ADMIN — AMPICILLIN SODIUM AND SULBACTAM SODIUM 200 GRAM(S): 250; 125 INJECTION, POWDER, FOR SUSPENSION INTRAMUSCULAR; INTRAVENOUS at 05:12

## 2024-05-17 RX ADMIN — HYDROMORPHONE HYDROCHLORIDE 1 MILLIGRAM(S): 2 INJECTION INTRAMUSCULAR; INTRAVENOUS; SUBCUTANEOUS at 05:07

## 2024-05-17 RX ADMIN — BUMETANIDE 2 MILLIGRAM(S): 0.25 INJECTION INTRAMUSCULAR; INTRAVENOUS at 05:13

## 2024-05-17 RX ADMIN — ENOXAPARIN SODIUM 60 MILLIGRAM(S): 100 INJECTION SUBCUTANEOUS at 17:39

## 2024-05-17 RX ADMIN — AMPICILLIN SODIUM AND SULBACTAM SODIUM 200 GRAM(S): 250; 125 INJECTION, POWDER, FOR SUSPENSION INTRAMUSCULAR; INTRAVENOUS at 17:40

## 2024-05-17 RX ADMIN — LIDOCAINE 3 PATCH: 4 CREAM TOPICAL at 19:51

## 2024-05-17 RX ADMIN — METHOCARBAMOL 750 MILLIGRAM(S): 500 TABLET, FILM COATED ORAL at 21:54

## 2024-05-17 RX ADMIN — METHOCARBAMOL 750 MILLIGRAM(S): 500 TABLET, FILM COATED ORAL at 13:49

## 2024-05-17 RX ADMIN — OXYCODONE HYDROCHLORIDE 20 MILLIGRAM(S): 5 TABLET ORAL at 10:15

## 2024-05-17 RX ADMIN — LIDOCAINE 3 PATCH: 4 CREAM TOPICAL at 11:35

## 2024-05-17 NOTE — PROGRESS NOTE ADULT - SUBJECTIVE AND OBJECTIVE BOX
NP Note discussed with  primary attending    Patient is a 54y old  Female who presents with a chief complaint of Complex Regional Pain syndrome flare (17 May 2024 10:25)      INTERVAL HPI/OVERNIGHT EVENTS: no new complaints    MEDICATIONS  (STANDING):  ampicillin/sulbactam  IVPB      ampicillin/sulbactam  IVPB 3 Gram(s) IV Intermittent every 6 hours  atorvastatin 20 milliGRAM(s) Oral at bedtime  buMETAnide 2 milliGRAM(s) Oral daily  carvedilol 3.125 milliGRAM(s) Oral every 12 hours  enoxaparin Injectable 60 milliGRAM(s) SubCutaneous every 12 hours  fluticasone propionate 50 MICROgram(s)/spray Nasal Spray 1 Spray(s) Both Nostrils two times a day  insulin glargine Injectable (LANTUS) 54 Unit(s) SubCutaneous at bedtime  insulin lispro (ADMELOG) corrective regimen sliding scale   SubCutaneous at bedtime  insulin lispro (ADMELOG) corrective regimen sliding scale   SubCutaneous three times a day before meals  insulin lispro Injectable (ADMELOG) 12 Unit(s) SubCutaneous three times a day before meals  lidocaine   4% Patch 3 Patch Transdermal daily  melatonin 6 milliGRAM(s) Oral at bedtime  methocarbamol 750 milliGRAM(s) Oral every 8 hours  montelukast 10 milliGRAM(s) Oral at bedtime  oxyCODONE    IR 20 milliGRAM(s) Oral every 8 hours  pantoprazole    Tablet 40 milliGRAM(s) Oral before breakfast  pregabalin 100 milliGRAM(s) Oral every 8 hours  senna 2 Tablet(s) Oral at bedtime    MEDICATIONS  (PRN):  acetaminophen     Tablet .. 1000 milliGRAM(s) Oral every 8 hours PRN Moderate Pain (4 - 6)  albuterol    90 MICROgram(s) HFA Inhaler 2 Puff(s) Inhalation every 6 hours PRN Bronchospasm  aluminum hydroxide/magnesium hydroxide/simethicone Suspension 30 milliLiter(s) Oral every 4 hours PRN Dyspepsia  HYDROmorphone  Injectable 1 milliGRAM(s) IV Push every 6 hours PRN Severe Pain (7 - 10)  lactulose Syrup 20 Gram(s) Oral three times a day PRN Constipation  ondansetron Injectable 4 milliGRAM(s) IV Push every 8 hours PRN Nausea and/or Vomiting      __________________________________________________  REVIEW OF SYSTEMS:    CONSTITUTIONAL: No fever,   EYES: no acute visual disturbances  NECK: No pain or stiffness  RESPIRATORY: No cough; No shortness of breath  CARDIOVASCULAR: No chest pain, no palpitations  GASTROINTESTINAL: No pain. No nausea or vomiting; No diarrhea   NEUROLOGICAL: No headache or numbness, no tremors  MUSCULOSKELETAL: No joint pain, no muscle pain  GENITOURINARY: no dysuria, no frequency, no hesitancy  PSYCHIATRY: no depression , no anxiety  ALL OTHER  ROS negative        Vital Signs Last 24 Hrs  T(C): 36.9 (17 May 2024 12:43), Max: 36.9 (17 May 2024 04:56)  T(F): 98.4 (17 May 2024 12:43), Max: 98.5 (17 May 2024 04:56)  HR: 51 (17 May 2024 12:43) (51 - 61)  BP: 123/57 (17 May 2024 12:43) (119/57 - 132/53)  BP(mean): --  RR: 18 (17 May 2024 12:43) (18 - 18)  SpO2: 96% (17 May 2024 12:43) (94% - 96%)    Parameters below as of 17 May 2024 12:43  Patient On (Oxygen Delivery Method): room air        ________________________________________________  PHYSICAL EXAM:  GENERAL: NAD  HEENT: Normocephalic;  conjunctivae and sclerae clear; moist mucous membranes;   NECK : supple  CHEST/LUNG: Clear to ausculitation bilaterally with good air entry   HEART: S1 S2  regular; no murmurs, gallops or rubs  ABDOMEN: Soft, Nontender, Nondistended; Bowel sounds present  EXTREMITIES: no cyanosis; no edema; no calf tenderness  SKIN: warm and dry; no rash  NERVOUS SYSTEM:  Awake and alert; Oriented  to place, person and time ; no new deficits    _________________________________________________  LABS:                        10.8   9.49  )-----------( 363      ( 17 May 2024 05:34 )             34.8     05-17    139  |  102  |  11  ----------------------------<  159<H>  3.3<L>   |  31  |  0.93    Ca    8.3<L>      17 May 2024 05:34        Urinalysis Basic - ( 17 May 2024 05:34 )    Color: x / Appearance: x / SG: x / pH: x  Gluc: 159 mg/dL / Ketone: x  / Bili: x / Urobili: x   Blood: x / Protein: x / Nitrite: x   Leuk Esterase: x / RBC: x / WBC x   Sq Epi: x / Non Sq Epi: x / Bacteria: x      CAPILLARY BLOOD GLUCOSE      POCT Blood Glucose.: 171 mg/dL (17 May 2024 12:06)  POCT Blood Glucose.: 164 mg/dL (17 May 2024 09:12)  POCT Blood Glucose.: 175 mg/dL (16 May 2024 21:18)  POCT Blood Glucose.: 238 mg/dL (16 May 2024 17:30)        RADIOLOGY & ADDITIONAL TESTS:    Imaging Personally Reviewed:  YES    Consultant(s) Notes Reviewed:   YES    Care Discussed with Consultants :     Plan of care was discussed with patient and /or primary care giver; all questions and concerns were addressed and care was aligned with patient's wishes.

## 2024-05-17 NOTE — PROGRESS NOTE ADULT - PROBLEM SELECTOR PLAN 3
-s/p lipoma resection 4/15 and [facility]. Checked up on last week, had needle placed in cheek, no further drainage  mild fever at home, now with slightly increased swelling  -c/w Unasyn  -viscous lidocaine q4h PRN  -send blood culture if patient is febrile again   -trend esr and crp  -ID Dr. Short following   -f/u outpatient with Plastic, outpatient plastic NP: Bebeto Hay [phone]

## 2024-05-17 NOTE — PROGRESS NOTE ADULT - PROBLEM SELECTOR PLAN 1
Pt with chronic right flank pain which is somatic and neuropathic in nature due to history of complex regional pain syndrome and right intercostal neuralgia. Since dx pt has tried numerous medications/interventions without relief, including (Amitriptyline and Duloxetine which caused patti, max dose of Gabapentin, and epidural spinal injections). Pt s/p spinal cord stimulator; however she reports that it is not effective in managing her symptoms. She does not currently have an outpatient pain management provider and has been relying on her PCP to prescribe opioids to manage her pain.   Opioid pain recommendations   - Recommend DC Dilaudid 1 mg IVP q6h PRN for severe pain. ( for discharge home pt to continue PO regimen )   - Continue oxycodone 20mg PO q 8hrs  for severe pain. (patient's most recent home dose, verified on iSTOP) Monitor for respiratory depression/sedation.  Non-opioid pain recommendations   - Continue Robaxin 750 mg po q8h x 7 days. Monitor for drowsiness.  - Continue Tylenol 1g q 8hrs PRN for moderate pain.   - Continue Lyrica 100mg q 8hrs. (home med)  - Continue Lidoderm 4% patch daily. x 3 (12 hrs on/12 hrs off)  Bowel Regimen  - Continue Miralax 17G PO daily   - Continue Senna 2 tablets at bedtime for constipation Pt with chronic right flank pain which is somatic and neuropathic in nature due to history of complex regional pain syndrome and right intercostal neuralgia. Since dx pt has tried numerous medications/interventions without relief, including (Amitriptyline and Duloxetine which caused patti, max dose of Gabapentin, and epidural spinal injections). Pt s/p spinal cord stimulator; however she reports that it is not effective in managing her symptoms. She does not currently have an outpatient pain management provider and has been relying on her PCP to prescribe opioids to manage her pain.   Opioid pain recommendations   - Recommend DC Dilaudid 1 mg IVP q6h PRN for severe pain. ( for discharge home pt to continue PO regimen )   - Continue oxycodone 20mg PO q 8hrs  for severe pain. (patient's most recent home dose, verified on iSTOP) Monitor for respiratory depression/sedation.  Non-opioid pain recommendations   - Renew Robaxin 750 mg po q8h x 7 days. Monitor for drowsiness. (Per pt it works well with spasms)  - Continue Tylenol 1g q 8hrs PRN for moderate pain.   - Continue Lyrica 100mg q 8hrs. (home med)  - Continue Lidoderm 4% patch daily. x 3 (12 hrs on/12 hrs off)  Bowel Regimen  - Continue Miralax 17G PO daily   - Continue Senna 2 tablets at bedtime for constipation

## 2024-05-17 NOTE — PROGRESS NOTE ADULT - REASON FOR ADMISSION
Complex Regional Pain syndrome flare
- - -

## 2024-05-17 NOTE — PROGRESS NOTE ADULT - PROBLEM SELECTOR PLAN 10
-pending improvement in pain, medication adjusted by pain specialist.
-pending improvement in pain
-pending improvement in pain
-pending improvement in pain, medication adjusted by pain specialist.
-pending improvement in pain

## 2024-05-17 NOTE — PROGRESS NOTE ADULT - PROVIDER SPECIALTY LIST ADULT
Infectious Disease
Internal Medicine
Pain Medicine
Pain Medicine
Internal Medicine
Pain Medicine
Pain Medicine
Internal Medicine
Internal Medicine
Pain Medicine
Internal Medicine
Internal Medicine

## 2024-05-17 NOTE — PROGRESS NOTE ADULT - PROBLEM SELECTOR PROBLEM 1
Complex regional pain syndrome type 1

## 2024-05-17 NOTE — PROGRESS NOTE ADULT - PROBLEM SELECTOR PLAN 2
Mild pain (score 1-3)  - Non-pharmacological pain treatment recommendations  - Warm/ Cool packs PRN   - Repositioning extremity, elevation, imagery, relaxation, distraction.  - Physical therapy OOB if no contraindications   Recommendations discussed with primary team and RN.    *** Patient can follow up with (pain specialist) Dr. Hay - [phone], upon discharge from hospital ***. Patient has called office and is awaiting insurance verification to schedule appointment. Patient is also amenable to following up with (pain specialist) Dr. Lowell Don who she has seen in the past.    Upon discharge can send home with: 1 week supply of Robaxin 750 mg q 8hrs PRN for muscle spasms, patient should have oxycodone 20mg tablets at home which was dispensed on 5/2, which she can continue. If not, prescribe oxycodone 20mg IR q 8hrs PRN 3 day supply only. Continue Lyrica 100mg q 8hrs.

## 2024-05-17 NOTE — PROGRESS NOTE ADULT - ASSESSMENT
Search Terms: Fauzia Linares, 1970    Search Date: 05/08/2024 09:30:53 AM  The Drug Utilization Report below displays all of the controlled substance prescriptions, if any, that your patient has filled in the last twelve months. The information displayed on this report is compiled from pharmacy submissions to the Department, and accurately reflects the information as submitted by the pharmacies.    This report was requested by: Danni Silva | Reference #: [id number]    Practitioner Count: 5  Pharmacy Count: 2  Current Opioid Prescriptions: 1  Current Benzodiazepine Prescriptions: 0  Current Stimulant Prescriptions: 0      Patient Demographic Information (PDI)       PDI	First Name	Last Name	Birth Date	Gender	Street Address	[facility]	Zip Code  A	Fauzia Linares	1970	Female	[address]	[place]	[address]  B	Fauzia Linares	1970	Female	[address]	[place]	[address]  C	Fauzia Linares	1970	Female	[address]	[place]	[address]  D	Fauzia Linares	1970	Female	[address]	[place] [facility]	[address]  E	Fauzia Linares	1970	Female	[address]	[facility] [facility]	[place]	[address]  F	Fauzia Linares	1970	Female	[address]	[facility]	[place]	[address]  G	Fauzia Linares	1970	Female	[address]	[place]	[address]    Prescription Information      PDI Filter:    PDI	My Rx	Current Rx	Drug Type	Rx Written	Rx Dispensed	Drug	Quantity	Days Supply	Prescriber Name	Prescriber CROW #	Payment Method	Dispenser  A	N	N	O	03/26/2024	03/26/2024	oxycodone hcl (ir) 5 mg tablet	30	5	Quinones, Elizabeth MERLOS	[id number]	[facility]	N	N	O	03/26/2024	03/26/2024	oxycodone hcl (ir) 10 mg tab	30	5	Quinones, Elizabeth MERLOS	[id number]	[facility]  A	N	N	O	03/26/2024	03/26/2024	oxycontin er 10 mg tablet	10	5	Quinones, Elizabeth MERLOS	[id number]	[facility]  B	N	N		10/06/2023	10/07/2023	pregabalin 100 mg capsule	90	30	Tc, Reba	[id number]	Medicare	[facility]  B	N	N	O	09/28/2023	10/01/2023	oxycodone hcl (ir) 15 mg tab	30	5	Tc, Reba	[id number]	Medicare	[facility]  B	N	N	O	09/27/2023	09/27/2023	oxycodone hcl (ir) 15 mg tab	30	8	Tc, Reba	[id number]	Medicare	[facility]  B	N	N	O	09/10/2023	09/10/2023	oxycodone hcl (ir) 15 mg tab	30	5	Tc, Reba	[id number]	Medicare	[facility]  B	N	N		09/06/2023	09/06/2023	pregabalin 100 mg capsule	90	30	Tc, Reba	[id number]	Medicare	[facility]  B	N	N	O	08/27/2023	08/28/2023	oxycodone hcl (ir) 15 mg tab	30	5	Tc, Reba	[id number]	Medicare	[facility]  B	N	N	O	08/12/2023	08/13/2023	oxycodone hcl (ir) 15 mg tab	30	5	Tc, Reba	[id number]	Medicare	[facility]  B	N	N		08/04/2023	08/05/2023	pregabalin 100 mg capsule	90	30	Tc, Reba	[id number]	Medicare	[facility]  B	N	N		07/21/2023	07/22/2023	pregabalin 100 mg capsule	42	14	Tc, Reba	[id number]	Medicare	[facility]  B	N	N	O	07/16/2023	07/16/2023	oxycodone hcl (ir) 15 mg tab	30	7	Tc, Reba	[id number]	Medicare	[facility]  B	N	N	O	06/30/2023	07/01/2023	oxycodone hcl (ir) 15 mg tab	42	10	Tc, Reba	[id number]	Medicare	[facility]  B	N	N		06/19/2023	06/20/2023	pregabalin 100 mg capsule	90	30	Tc, Reba	[id number]	Medicare	[facility]  B	N	N	O	06/06/2023	06/07/2023	oxycodone hcl (ir) 15 mg tab	42	7	Tc, Reba	[id number]	Medicare	[facility]  B	N	N	O	05/22/2023	05/23/2023	oxycodone hcl (ir) 15 mg tab	42	10	Tc, Reba	[id number]	Medicare	[facility]  B	N	N		05/19/2023	05/20/2023	pregabalin 100 mg capsule	90	30	Tc, Reba	[id number]	Medicare	[facility]  C	N	N	O	11/28/2023	11/30/2023	oxycodone hcl (ir) 20 mg tab	60	30	Vinny Maldonado MD	[id number]	Insurance	[id number]  C	N	N	O	11/16/2023	11/18/2023	oxycodone-acetaminophen  mg tab	30	15	Vinny Maldonado MD	[id number]	Insurance	[id number]  C	N	N		11/16/2023	11/18/2023	pregabalin 100 mg capsule	90	30	Vinny Maldonado MD	[id number]	Insurance	[id number]  C	N	N		09/28/2023	11/01/2023	pregabalin 100 mg capsule	30	10	Tc, Reba	[id number]	Insurance	[id number]  D	N	N	O	12/29/2023	12/29/2023	oxycodone hcl (ir) 15 mg tab	12	3	Christopher Montiel	[id number]	Insurance	[facility]  D	N	N		12/29/2023	12/29/2023	butalbital-acetaminophen-caffeine -40 mg tablet	53	8	Christopher Montiel	[id number]	Insurance	[facility]	N	N	O	10/12/2023	10/14/2023	oxycodone hcl (ir) 15 mg tab	28	7	Naomi Ybarra	[id number]	Insurance	[id number]  E	N	N		02/08/2024	02/09/2024	pregabalin 100 mg capsule	42	7	Alba Jasso M	[id number]	Insurance	[facility]  E	N	N	O	02/08/2024	02/09/2024	hydromorphone 4 mg tablet	20	5	Alba Jasso M	[id number]	[facility]  E	N	N		01/25/2024	01/26/2024	pregabalin 100 mg capsule	90	30	Vinny Maldonado MD	[id number]	[facility]	N	N	O	01/25/2024	01/26/2024	oxycodone hcl (ir) 20 mg tab	60	30	Vinny Maldonado MD	[id number]	[facility]	N	Y		04/03/2024	05/06/2024	pregabalin 100 mg capsule	90	30	Vinny Maldonado MD	[id number]	[facility]	N	Y	O	04/30/2024	05/02/2024	oxycodone hcl (ir) 20 mg tab	60	30	Vinny Maldonado MD	[id number]	[facility]	N	N	O	04/16/2024	04/17/2024	oxycodone hcl (ir) 5 mg tablet	10	3	Frank Ba	[id number]	[facility]	N	N		04/03/2024	04/04/2024	pregabalin 100 mg capsule	90	30	Vinny Maldonado MD	[id number]	[facility]	N	N	O	03/04/2024	03/05/2024	hydromorphone 4 mg tablet	20	5	Bonny Bryant	[id number]	[facility]  G	N	N		09/28/2023	10/02/2023	pregabalin 100 mg capsule	30	10	Reba Montez	[id number]	[id number]    * - Details of Drug Type : O = Opioid, B = Benzodiazepine, S = Stimulant    * - Drugs marked with an asterisk are compound drugs. If the compound drug is made up of more than one controlled substance, then each controlled substance will be a separate row in the table.

## 2024-05-17 NOTE — PROGRESS NOTE ADULT - NS ATTEND OPT1 GEN_ALL_[email or web address]

## 2024-05-17 NOTE — PROGRESS NOTE ADULT - SUBJECTIVE AND OBJECTIVE BOX
Source of information: ROBIN LÓPEZ, Chart review  Patient language: English  : n/a    HPI:  This is a 55 y/o F, from home, lives alone, ambulates with cane/walker/wheelchair, with PMHx of DM, HTN, HLD, CHF, complex regional pain syndrome of right back intercostal muscles s/p spinal cord stimulator (Mar 2023) who presents with right flank pain. Pt states she is having a flare up of her complex regional pain syndrome that started 2 days ago. Pt mentions she ran out of her pain medications and has been taking up to six 800mg Ibuprofen daily. Pt does not follow with a pain specialist currently. Pt also states she had a recent lipoma resection from her right cheek (4/15/2024) after which she completed a course of antibiotics. Pt states she was having drainage and went to see her plastic surgeon on May 2nd, after which they placed a needle in the cheek and drained out fluid. Pt states that since that time she has not had any further fluid drain. Pt mentions she now has pain and slightly increased swelling in the cheek area. Pt mentions she had a 100.6 fever this morning and felt chills. Pt denies any recent travel, recent illness, dysuria, CP, SOB, N/V/D, or constipation. (08 May 2024 09:45)    Pt is admitted for intractable right flank pain. Pain service consulted on 5/8 for management of right flank pain. Per patient, she was diagnosed with complex regional pain syndrome and right sided intercostal neuralgia in 2016. Since her diagnosis she has trialed numerous medications/interventions without relief, including (amitriptyline and duloxetine which caused patti, max dose of gabapentin, and epidural spinal injections). She was recommended a spinal cord stimulator which was placed in March 2023, however she reports that it is not effective in managing her symptoms. She does not currently have an outpatient pain management provider and has been relying on her PCP to prescribe opioids to manage her pain. Patient also present with right facial swelling/droop since 4/24 after removal of right cheek mass. CT Maxillofacial demonstrates soft tissue swelling with air containing collection overlying the right malar body and the premaxillary space consistent with an air-containing abscess which is new since 7/20/2023.     Pt seen and examined this morning, while laying in bed. Reports pain score 9/10 to right flank and right low backpain. Pt stating pain level 9/10 is tolerable currently at the time of assessment. No acute distress noted, pt calm and cooperative. Pt continues to describe pain as constant, sharp, burning, and aching in quality, also reports intermittent muscle spasms. Patient states that "Dilaudid IV" is the only medication that works for her during a CRPS flare. Pt states that she is frustrated with discussions with multiple providers to explain her pain history. Pt reports that in 2022 she underwent implantation of a pain stimulator to treat the right intercostal pain at [facility].  She states that she had acute respiratory distress after she had a punctured lung and ended up in a coma for 8 days with the surgery.  The pain management physician was Dr. Deirdre Blankenship however she said she did not follow up with this physician after her experience and since then she has not been able to establish care with a pain management provider as they feel uncomfortable prescribing pain meds. The pain is alleviated with current regimen Dilaudid IV, Oxycodone IR 20 mg po q8h, added to regimen and Robaxin Po. Pain is exacerbated by movement and taking deep inhalations. States that her baseline pain is 12/10 at which she is able to participate in daily activities however the Dilaudid IV brings her pain down to 8/10 at which she is more comfortable. Pt tolerating PO diet. Denies lethargy, chest pain, SOB, nausea, vomiting. She reports history of constipation. Last BM 5/17 today - states that she takes [facility] outpatient. Patient stated goal for pain control: to be able to take deep breaths, get out of bed to chair and ambulate with tolerable pain control. Patient ambulates with rollator/straight cane at baseline and uses wheelchair for long distances. Per patient, her most recent pain regimen includes Xanaflex 4mg TID, Lyrica 100mg TID, and Oxycodone 20mg BID. Pt also reports being admitted to [facility] on 3/2024 with same symptoms and flare where she was treated with Dilaudid IV and Ketamine IV to control acute pain and then DC'd home. To note, director of pain services contacted pain specialist Dr. Lowell Don at [facility] who has seen patient in the past. Patient is amenable to seeing Dr. Don again and reports missing previous follow up appointments due to frequent hospitalizations.     PAST MEDICAL & SURGICAL HISTORY:  HTN (hypertension)    Neuropathy    Obesity    Former cigarette smoker  (smoked x 45 years; quit ~2013)    Marijuana smoker, continuous  (states smokes 3 - 4 times per day for pain management reasons)    Neuralgia  (pt states hx/o "intercostal neuralgia")    Diabetes    Essential hypertension    IBS (irritable bowel syndrome)    Complex regional pain syndrome type 1    DM2 (diabetes mellitus, type 2)    Intercostal neuralgia    Localized swelling, mass and lump, head    Asthma    History of CHF (congestive heart failure)    Fibroids    Idiopathic intracranial hypertension    History of cholecystectomy    History of hand surgery  (pt states she had 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12)    Spinal cord neurostimulator device in situ    History of removal of cyst    FAMILY HISTORY:  FH: HTN (hypertension)    Social History:  Former smoker, quit in 2014, up to 1 PPD for 35 years  denies EtOH use (08 May 2024 09:45)   [x]Denies ETOH use, illicit drug use, and smoking     Allergies    amitriptyline (Other)  duloxetine (Other)    Intolerances    MEDICATIONS  (STANDING):  ampicillin/sulbactam  IVPB      ampicillin/sulbactam  IVPB 3 Gram(s) IV Intermittent every 6 hours  atorvastatin 20 milliGRAM(s) Oral at bedtime  buMETAnide 2 milliGRAM(s) Oral daily  carvedilol 3.125 milliGRAM(s) Oral every 12 hours  enoxaparin Injectable 60 milliGRAM(s) SubCutaneous every 12 hours  fluticasone propionate 50 MICROgram(s)/spray Nasal Spray 1 Spray(s) Both Nostrils two times a day  insulin glargine Injectable (LANTUS) 54 Unit(s) SubCutaneous at bedtime  insulin lispro (ADMELOG) corrective regimen sliding scale   SubCutaneous at bedtime  insulin lispro (ADMELOG) corrective regimen sliding scale   SubCutaneous three times a day before meals  insulin lispro Injectable (ADMELOG) 12 Unit(s) SubCutaneous three times a day before meals  lidocaine   4% Patch 3 Patch Transdermal daily  melatonin 6 milliGRAM(s) Oral at bedtime  montelukast 10 milliGRAM(s) Oral at bedtime  oxyCODONE    IR 20 milliGRAM(s) Oral every 8 hours  pantoprazole    Tablet 40 milliGRAM(s) Oral before breakfast  pregabalin 100 milliGRAM(s) Oral every 8 hours  senna 2 Tablet(s) Oral at bedtime    MEDICATIONS  (PRN):  acetaminophen     Tablet .. 1000 milliGRAM(s) Oral every 8 hours PRN Moderate Pain (4 - 6)  albuterol    90 MICROgram(s) HFA Inhaler 2 Puff(s) Inhalation every 6 hours PRN Bronchospasm  aluminum hydroxide/magnesium hydroxide/simethicone Suspension 30 milliLiter(s) Oral every 4 hours PRN Dyspepsia  HYDROmorphone  Injectable 1 milliGRAM(s) IV Push every 6 hours PRN Severe Pain (7 - 10)  lactulose Syrup 20 Gram(s) Oral three times a day PRN Constipation  ondansetron Injectable 4 milliGRAM(s) IV Push every 8 hours PRN Nausea and/or Vomiting    Vital Signs Last 24 Hrs  T(C): 36.9 (17 May 2024 04:56), Max: 36.9 (17 May 2024 04:56)  T(F): 98.5 (17 May 2024 04:56), Max: 98.5 (17 May 2024 04:56)  HR: 61 (17 May 2024 04:56) (53 - 67)  BP: 132/53 (17 May 2024 04:56) (119/57 - 132/53)  BP(mean): --  RR: 18 (17 May 2024 04:56) (18 - 18)  SpO2: 94% (17 May 2024 04:56) (93% - 96%)    Parameters below as of 17 May 2024 04:56  Patient On (Oxygen Delivery Method): room air    SARS-CoV-2: NotDetec (25 Mar 2024 15:31)  SARS-CoV-2: NotDetec (08 Dec 2023 01:28)    LABS: Reviewed                        10.8   9.49  )-----------( 363      ( 17 May 2024 05:34 )             34.8     05-17    139  |  102  |  11  ----------------------------<  159<H>  3.3<L>   |  31  |  0.93    Ca    8.3<L>      17 May 2024 05:34    Urinalysis Basic - ( 17 May 2024 05:34 )    Color: x / Appearance: x / SG: x / pH: x  Gluc: 159 mg/dL / Ketone: x  / Bili: x / Urobili: x   Blood: x / Protein: x / Nitrite: x   Leuk Esterase: x / RBC: x / WBC x   Sq Epi: x / Non Sq Epi: x / Bacteria: x    CAPILLARY BLOOD GLUCOSE    POCT Blood Glucose.: 164 mg/dL (17 May 2024 09:12)  POCT Blood Glucose.: 175 mg/dL (16 May 2024 21:18)  POCT Blood Glucose.: 238 mg/dL (16 May 2024 17:30)  POCT Blood Glucose.: 167 mg/dL (16 May 2024 11:14)    SARS-CoV-2: NotDetec (25 Mar 2024 15:31)  SARS-CoV-2: NotDetec (08 Dec 2023 01:28)    Radiology: Reviewed  ACC: [id number] EXAM:  CT MAXILLOFACIAL  IC   ORDERED BY: BEN HANKS     PROCEDURE DATE:  05/08/2024      INTERPRETATION:  Clinical indication: Cheeks swelling.    Serial thin sections on a multi slice scanner were obtained through the   orbits and paranasal sinuses after contrast infusion with sagittal and   coronal computer-generated reconstructed views. 90 cc of Omnipaque 350   were intravenously injected, 10 cc were discarded.    Comparison is made with the prior maxillofacial CT of7/20/2023.    There is soft tissue swelling overlying the maxilla with inflammatory   changes in the fat. There is a air containing collection within this area   of soft tissue swelling. The collection measures 2.1 cm in AP diameter by   2.6 cm transversely by 2.8 cm in craniocaudal diameter. The air   collection measures 1.6 cm in AP diameter by 2 cm transversely by 2.8 cm   in craniocaudal diameter. This is consistent with an abscess with air   which may be iatrogenic.    There is no adjacent bone destruction. There is no adjacent periodontal   disease. There is a retention cyst or polyp in the right maxillary sinus.   The malar body and zygomatic arch is intact. The nasal septum is midline.   The left maxillary sinus and ethmoid sphenoid and frontal sinuses are   clear.    The visualized vascular structures are intact. The uvula is normal, the   epiglottis is normal. The visualized salivary glands are normal.    IMPRESSION: Soft tissue swelling with air containing collection overlying   the right malar body and the premaxillary space consistent with an   air-containing abscess which is new since 7/20/2023.    --- End of Report ---    ELMA SIDHU MD; Attending Radiologist  This document has been electronically signed. May8 2024  4:35PM  ACC: [id number] EXAM:  CT ABDOMEN AND PELVIS   ORDERED BY: TRACE ROYAL     PROCEDURE DATE:  05/08/2024      INTERPRETATION:  CLINICAL INFORMATION: Right flank pain    COMPARISON: CT abdomen pelvis 1/22/2023    CONTRAST/COMPLICATIONS:  IV Contrast: None  Oral Contrast: None  Complications: None    PROCEDURE:  CT of the Abdomen and Pelvis was performed.  Sagittal and coronal reformats were performed.    FINDINGS:  LOWER CHEST: Aortic root, coronary artery, mitral annular calcifications.   Linear subsegmental atelectasis in the lingula.    LIVER: Within normal limits.  BILE DUCTS: Normal caliber.  GALLBLADDER: Cholecystectomy.  SPLEEN: Within normal limits.  PANCREAS: Within normal limits.  ADRENALS: Within normal limits.  KIDNEYS/URETERS: Punctate nonobstructing stone in the interpolar region   of the right kidney. No hydronephrosis or perinephric inflammation.    BLADDER: Within normal limits.  REPRODUCTIVE ORGANS: Intrauterine device. No gross adnexal masses.    BOWEL: Duodenal diverticulum. No bowel obstruction. Appendix is normal.   Moderate stool in the rectosigmoid colon. No bowel wall thickening or   pericolonic inflammatory change.  PERITONEUM: No ascites.  VESSELS: Mild atherosclerotic changes.  RETROPERITONEUM/LYMPH NODES: No lymphadenopathy.  ABDOMINAL WALL: Small fat-containing umbilical hernia. Spinal stimulator   generator in the right flank subcutaneous soft tissues. Mild diffuse   anasarca.  BONES: Degenerative changes. Spinal stimulator electrodes.    IMPRESSION:  No evidence for obstructive uropathy.    --- End of Report ---    MILE COSTELLO MD; Attending Radiologist  This document has been electronically signed. May  8 2024  6:54AM    ORT Score -   Family Hx of substance abuse	Female	      Male  Alcohol 	                                           1                     3  Illegal drugs	                                   2                     3  Rx drugs                                           4 	                  4  Personal Hx of substance abuse		  Alcohol 	                                          3	                  3  Illegal drugs                                     4	                  4  Rx drugs                                            5 	                  5  Age between 16- 45 years	           1                     1  hx preadolescent sexual abuse	   3 	                  0  Psychological disease		  ADD, OCD, bipolar, schizophrenia   2	          2  Depression                                           1 	          1  Total: 0    a score of 3 or lower indicates low risk for opioid abuse		  a score of 4-7 indicates moderate risk for opioid abuse		  a score of 8 or higher indicates high risk for opioid abuse    REVIEW OF SYSTEMS:  CONSTITUTIONAL: No fever or fatigue  HEENT:  No difficulty hearing, no change in vision  NECK: No pain or stiffness  RESPIRATORY: No cough, wheezing, chills or hemoptysis; No shortness of breath  CARDIOVASCULAR: No chest pain, palpitations, dizziness, or leg swelling  GASTROINTESTINAL: No loss of appetite, decreased PO intake. No abdominal or epigastric pain. No nausea, vomiting; No diarrhea or constipation.   GENITOURINARY: No dysuria, frequency, hematuria, retention or incontinence  MUSCULOSKELETAL: + right flank pain; + right upper and lower back pain, chronic upper or lower motor strength weakness due to pain, no saddle anesthesia, bowel/bladder incontinence, no falls   NEURO: No headaches, + numbness/tingling b/l LE, No weakness  ENDOCRINE: No polyuria, polydipsia, heat or cold intolerance; No hair loss  PSYCHIATRIC: No depression, anxiety or difficulty sleeping    PHYSICAL EXAM:  GENERAL:  Alert & Oriented X 4, cooperative, NAD, Good concentration. Speech is clear, (right facial droop, since 4/2024 after surgical removal of right cheek mass.)  RESPIRATORY: Respirations even and unlabored. Clear to auscultation bilaterally  CARDIOVASCULAR: Normal S1/S2, regular rate and rhythm  GASTROINTESTINAL:  Soft, obese per BMI, right flank tenderness, bowel sounds present  PERIPHERAL VASCULAR: Extremities warm without edema. 2+ Peripheral Pulses, No cyanosis, No calf tenderness  MUSCULOSKELETAL: Motor Strength 4/5 B/L upper and lower extremities; moves all extremities equally against gravity; + right upper and lower back tenderness  SKIN: Warm, dry, intact, spinal cord stimulator battery palpable to right low back, mid back scar from spinal cord stimulator insertion    Risk factors associated with adverse outcomes related to opioid treatment  [ ]  Concurrent benzodiazepine use  [ ]  History/ Active substance use or alcohol use disorder  [ ] Psychiatric co-morbidity  [ ] Sleep apnea  [ ] COPD  [x] BMI> 35  [ ] Liver dysfunction  [ ] Renal dysfunction  [ ] CHF  [x] Smoker  [ ]  Age > 60 years    [x]  [place]  Reviewed and Copied to Chart. See below.    Plan of care and goal oriented pain management treatment options were discussed with patient and /or primary care giver; all questions and concerns were addressed and care was aligned with patient's wishes.    Educated patient on goal oriented pain management treatment options     05-17-24 @ 10:58

## 2024-05-17 NOTE — PROGRESS NOTE ADULT - NS ATTEND AMEND GEN_ALL_CORE FT
Patient seen and examined. Case discussed with STEPHANIE Villalpando. Patient reports that her pain in her R flank has returned and is worse than yesterday. She does endorse some discomfort in her face on the right but thinks it is improving with the antibiotics. Case discussed with pain management (NP Mendez) and given her complex regional pain syndrome, will resume IV narcotics with IV dilaudid in addition to the po oxycodone IR 10mg. Tizanidine also changed to Robaxin for further pain control. When broached on the subject of ketamine, patient reports that she never wants to do those infusions again due to a prior bad experience. With respect to her right cheek wound and abscess, case discussed with Dr. Zhou and will plan to continue with Unasyn for now with goal to transition to po Augmentin upon discharge when pain is better controlled. Inflammatory markers are elevated but not to a concerning extent. Patient does remain hyperglycemic to the 300's. Lantus increased to 50U QHS and Admelog increased back to home 12/12/12. Hypokalemia - replete. Remaining care as noted above in NP note.
Patient seen and examined. Case discussed with STEPHANIE De. Patient continues to endorse pain to her right cheek. At the time of my evaluation, she was in the process of receiving toradol 30mg IVP x1 to assist with the pain and swelling of the cheek. Her pain to her right flank is improved from admission, but still an 8/10. Case discussed with pain management STEPHANIE Silva and recommendations are to continue the oxycodone IR 10mg for pain control at this time with the additional dose of toradol. I also spoke with IR JACKSON Cespedes who spoke with IR attending Dr. Ravi and per them, the "abscess" seen on the CT maxillofacial is all air and there is no fluid to drain at this time. I also spoke with STEPHANIE Hay who works for the patient's plastic surgeon, Dr. Ba and they recommend for the patient to come to the office next Tuesday (5/14) for possible drainage there and to continue the antibiotics until then. They also recommend warm compresses to assist with the pain. They do not feel transfer is needed at this time. Case also discussed with ID, Dr. Zhou. Will continue Unasyn for now and reassess in the AM. Patient remains hyperglycemic and above goal for her finger sticks. She takes Tresiba 54U at bedtime with Novolog 12/12/12. For now, will increase the insulin to Lantus 35U QHS with Admelog 8/8/8 and continue ISS. Suspect will likely need to increase this further pending finger sticks. Labs personally reviewed and notable for resolved leukocytosis. Hypomagnesemia - replete. Remaining care as noted above.
Patient seen and examined. Case discussed with STEPHANIE Sloan. Patient continues to endorse pain in her R back and flank. She is upset because she feels she has been taken off the IV dilaudid prematurely and she is anxious that her flare will return. She has called the pain management doctor and they are trying to determine whether her insurance is accepted in the office. They report they will call her back. Patient is inquiring about ketamine infusions as she feels that really helped her during her hospitalization at [facility]. I have reached out to STEPHANIE Gallego, director of pain management, regarding this and am awaiting a return phone call. I did explain to the patient that she can't be on IV dilaudid for the long term, which she understands, but it is possible that she may need ketamine to reset her pain receptors. Will follow-up with pain management. On exam, her right cheek remains ecchymotic and swollen but it does appear improved from yesterday. Leukocytosis is slightly uptrending but will monitor for now and continue Unasyn. Will need to follow up with Dr. Ba upon discharge for further evaluation of her wound. Remaining care as noted above in NP note.
Patient seen at bedside, states her pain is now worsening again, requiring IV dilaudid. After discussion at length, patient agrees with resuming home oxycodone 20mg q8h with dilaudid PRN for breakthrough pain, and verbalized understanding that she needs to be off dilaudid for discharge.  On exam, patient is AOx3, NAD, cardiopulmonary exams unremarkable, abdomen soft, NT/ND, extremities with 1+ pretibial edema b/l. Has tenderness to palpation in R chest, flank area.  Labs reviewed, CBC, BMP stable.    Assessment and plan:  55 y/o F, from home, lives alone, ambulates with cane/walker/wheelchair, with PMHx of DM, HTN, HLD, CHF, complex regional pain syndrome of right back intercostal muscles s/p spinal cord stimulator (Mar 2023) who presents with right flank pain. Admitted for intractable pain management and right facial swelling. Pt is s/p lipoma resection 4/15 [facility]. Pt had drainage and had needle placed in cheek by outpatient plastic surgeon, no further drainage was noted by plastic surgeon.     # CRPS flare  # soft tissue infection after lipoma resection at [facility]  # kidney stone on CT  # IDDM  # CHF  # HTN  # HLD    - will resume home oxycodone 20mg q8h PRN, decreased dilaudid PRN to q8h  - continue Unasyn for the skin infection (day 8 today), to follow with plastic surgery at outpatient  - continue insulin with lispro 12 units TID and glargine 54 units QD  - continue home bumex 2mg, statin, coreg  - urology outpatient follow up for the kidney stone  - DVT ppx: lovenox.
Patient seen at bedside, states her pain is better after resumed on dilaudid IV. She wants to be on dilaudid for the today, and would like to try off of the medication to see if the pain is manageable without it tomorrow.  On exam, patient is AOx3, NAD, cardiopulmonary exams unremarkable, abdomen soft, NT/ND, extremities with 1+ pretibial edema b/l.  Labs reviewed, CBC without remarkable change, BMP with K 3.3, normal Cr.    Assessment and plan:  53 y/o F, from home, lives alone, ambulates with cane/walker/wheelchair, with PMHx of DM, HTN, HLD, CHF, complex regional pain syndrome of right back intercostal muscles s/p spinal cord stimulator (Mar 2023) who presents with right flank pain. Admitted for intractable pain management and right facial swelling. Pt is s/p lipoma resection 4/15 [facility]. Pt had drainage and had needle placed in cheek by outpatient plastic surgeon, no further drainage was noted by plastic surgeon.     # CRPS flare  # soft tissue infection after lipoma resection at [facility]  # kidney stone on CT  # IDDM  # CHF  # HTN  # HLD    - discussed with pain management service regaring the ketamine infusion patient asked, the treatment is not available in house  - continue IV dilaudid for now, will discontinue and monitor pain tomorrow  - continue unasyn for the skin infection (day 7 today), to follo with plastic surgery at outpatient  - continue insulin with lispro 12 units TID and glargine 54 units QD  - continue home bumex 2mg, statin, coreg  - urology outpatient follow up for the kidney stone  - DVTvppx: lovenox
Patient seen at bedside, states her pain is somewhat better now.  On exam, patient is AOx3, has tenderness to palpation in R chest, flank area.  labs reviewed, CBC and BMP stable.    Assessment and plan:  55 y/o F, from home, lives alone, ambulates with cane/walker/wheelchair, with PMHx of DM, HTN, HLD, CHF, chronic right back intercostal muscles pain s/p spinal cord stimulator (Mar 2023) who presents with right flank pain. Admitted for intractable pain management and right facial swelling. Pt is s/p lipoma resection 4/15 [facility]. Pt had drainage and had needle placed in cheek by outpatient plastic surgeon, no further drainage was noted by plastic surgeon.     # chronic right back intercostal muscles pain   # soft tissue infection after lipoma resection at [facility]  # kidney stone on CT  # IDDM  # CHF  # HTN  # HLD    - discussed with patient at length regarding the plan, will discontinue PRN dilaudid today, monitor pain off PRN  - continue Unasyn for the skin infection (day 9 today), to follow with plastic surgery at outpatient  - continue insulin with lispro 12 units TID and glargine 54 units QD  - continue home bumex 2mg, statin, coreg  - urology outpatient follow up for the kidney stone  - DVT ppx: lovenox.
Patient seen at bedside, states her pain is unchanged and excruciating, asking for PRN dilaudid.  On exam, patient is AOx3, mildly in distress, abdomen soft, extremities with 1+ pretibial edema b/l. Has tenderness to palpation in R chest, flank area.  No labs available for review today.     Assessment and plan:  53 y/o F, from home, lives alone, ambulates with cane/walker/wheelchair, with PMHx of DM, HTN, HLD, CHF, chronic right back intercostal muscles pain s/p spinal cord stimulator (Mar 2023) who presents with right flank pain. Admitted for intractable pain management and right facial swelling. Pt is s/p lipoma resection 4/15 [facility]. Pt had drainage and had needle placed in cheek by outpatient plastic surgeon, no further drainage was noted by plastic surgeon.     # chronic right back intercostal muscles pain   # soft tissue infection after lipoma resection at [facility]  # kidney stone on CT  # IDDM  # CHF  # HTN  # HLD    - discussed with pain management NP Konstantin Gallego, patient has never been diagnosed with CRPS before. Discussed pain regimen, will start standing opioids with PRN dilaudid, the PRN is to be discontinued tomorrow  - continue Unasyn for the skin infection (day 8 today), to follow with plastic surgery at outpatient  - continue insulin with lispro 12 units TID and glargine 54 units QD  - continue home bumex 2mg, statin, coreg  - urology outpatient follow up for the kidney stone  - DVT ppx: lovenox.

## 2024-05-18 ENCOUNTER — TRANSCRIPTION ENCOUNTER (OUTPATIENT)
Age: 54
End: 2024-05-18

## 2024-05-18 VITALS
TEMPERATURE: 98 F | SYSTOLIC BLOOD PRESSURE: 135 MMHG | RESPIRATION RATE: 18 BRPM | OXYGEN SATURATION: 95 % | HEART RATE: 55 BPM | DIASTOLIC BLOOD PRESSURE: 61 MMHG

## 2024-05-18 LAB
GLUCOSE BLDC GLUCOMTR-MCNC: 270 MG/DL — HIGH (ref 70–99)
GLUCOSE BLDC GLUCOMTR-MCNC: 350 MG/DL — HIGH (ref 70–99)

## 2024-05-18 PROCEDURE — 87086 URINE CULTURE/COLONY COUNT: CPT

## 2024-05-18 PROCEDURE — 94640 AIRWAY INHALATION TREATMENT: CPT

## 2024-05-18 PROCEDURE — 82962 GLUCOSE BLOOD TEST: CPT

## 2024-05-18 PROCEDURE — 85025 COMPLETE CBC W/AUTO DIFF WBC: CPT

## 2024-05-18 PROCEDURE — 84484 ASSAY OF TROPONIN QUANT: CPT

## 2024-05-18 PROCEDURE — 99285 EMERGENCY DEPT VISIT HI MDM: CPT | Mod: 25

## 2024-05-18 PROCEDURE — 96376 TX/PRO/DX INJ SAME DRUG ADON: CPT

## 2024-05-18 PROCEDURE — 93005 ELECTROCARDIOGRAM TRACING: CPT

## 2024-05-18 PROCEDURE — 96374 THER/PROPH/DIAG INJ IV PUSH: CPT

## 2024-05-18 PROCEDURE — 96375 TX/PRO/DX INJ NEW DRUG ADDON: CPT

## 2024-05-18 PROCEDURE — 86140 C-REACTIVE PROTEIN: CPT

## 2024-05-18 PROCEDURE — 83880 ASSAY OF NATRIURETIC PEPTIDE: CPT

## 2024-05-18 PROCEDURE — 87641 MR-STAPH DNA AMP PROBE: CPT

## 2024-05-18 PROCEDURE — 83690 ASSAY OF LIPASE: CPT

## 2024-05-18 PROCEDURE — 84702 CHORIONIC GONADOTROPIN TEST: CPT

## 2024-05-18 PROCEDURE — 80048 BASIC METABOLIC PNL TOTAL CA: CPT

## 2024-05-18 PROCEDURE — 85027 COMPLETE CBC AUTOMATED: CPT

## 2024-05-18 PROCEDURE — 70487 CT MAXILLOFACIAL W/DYE: CPT | Mod: MC

## 2024-05-18 PROCEDURE — 81001 URINALYSIS AUTO W/SCOPE: CPT

## 2024-05-18 PROCEDURE — 83735 ASSAY OF MAGNESIUM: CPT

## 2024-05-18 PROCEDURE — 87640 STAPH A DNA AMP PROBE: CPT

## 2024-05-18 PROCEDURE — 84100 ASSAY OF PHOSPHORUS: CPT

## 2024-05-18 PROCEDURE — 71046 X-RAY EXAM CHEST 2 VIEWS: CPT

## 2024-05-18 PROCEDURE — 99239 HOSP IP/OBS DSCHRG MGMT >30: CPT

## 2024-05-18 PROCEDURE — 83036 HEMOGLOBIN GLYCOSYLATED A1C: CPT

## 2024-05-18 PROCEDURE — 80053 COMPREHEN METABOLIC PANEL: CPT

## 2024-05-18 PROCEDURE — 36415 COLL VENOUS BLD VENIPUNCTURE: CPT

## 2024-05-18 PROCEDURE — 74176 CT ABD & PELVIS W/O CONTRAST: CPT | Mod: MC

## 2024-05-18 PROCEDURE — 85652 RBC SED RATE AUTOMATED: CPT

## 2024-05-18 RX ORDER — METHOCARBAMOL 500 MG/1
1 TABLET, FILM COATED ORAL
Qty: 21 | Refills: 0
Start: 2024-05-18 | End: 2024-05-24

## 2024-05-18 RX ORDER — ACETAMINOPHEN 500 MG
2 TABLET ORAL
Qty: 84 | Refills: 0
Start: 2024-05-18 | End: 2024-05-31

## 2024-05-18 RX ORDER — ALBUTEROL 90 UG/1
2 AEROSOL, METERED ORAL
Qty: 1 | Refills: 0
Start: 2024-05-18 | End: 2024-06-16

## 2024-05-18 RX ADMIN — LIDOCAINE 3 PATCH: 4 CREAM TOPICAL at 11:46

## 2024-05-18 RX ADMIN — Medication 8: at 07:52

## 2024-05-18 RX ADMIN — METHOCARBAMOL 750 MILLIGRAM(S): 500 TABLET, FILM COATED ORAL at 05:41

## 2024-05-18 RX ADMIN — METHOCARBAMOL 750 MILLIGRAM(S): 500 TABLET, FILM COATED ORAL at 11:54

## 2024-05-18 RX ADMIN — CARVEDILOL PHOSPHATE 3.12 MILLIGRAM(S): 80 CAPSULE, EXTENDED RELEASE ORAL at 05:41

## 2024-05-18 RX ADMIN — PANTOPRAZOLE SODIUM 40 MILLIGRAM(S): 20 TABLET, DELAYED RELEASE ORAL at 05:40

## 2024-05-18 RX ADMIN — OXYCODONE HYDROCHLORIDE 20 MILLIGRAM(S): 5 TABLET ORAL at 03:27

## 2024-05-18 RX ADMIN — Medication 100 MILLIGRAM(S): at 05:41

## 2024-05-18 RX ADMIN — Medication 6: at 11:47

## 2024-05-18 RX ADMIN — Medication 12 UNIT(S): at 11:47

## 2024-05-18 RX ADMIN — Medication 100 MILLIGRAM(S): at 11:54

## 2024-05-18 RX ADMIN — Medication 12 UNIT(S): at 07:52

## 2024-05-18 RX ADMIN — ENOXAPARIN SODIUM 60 MILLIGRAM(S): 100 INJECTION SUBCUTANEOUS at 05:42

## 2024-05-18 RX ADMIN — Medication 1 SPRAY(S): at 05:42

## 2024-05-18 RX ADMIN — OXYCODONE HYDROCHLORIDE 20 MILLIGRAM(S): 5 TABLET ORAL at 09:14

## 2024-05-18 RX ADMIN — Medication 1000 MILLIGRAM(S): at 05:41

## 2024-05-18 RX ADMIN — Medication 1000 MILLIGRAM(S): at 06:52

## 2024-05-18 RX ADMIN — AMPICILLIN SODIUM AND SULBACTAM SODIUM 200 GRAM(S): 250; 125 INJECTION, POWDER, FOR SUSPENSION INTRAMUSCULAR; INTRAVENOUS at 05:41

## 2024-05-18 RX ADMIN — OXYCODONE HYDROCHLORIDE 20 MILLIGRAM(S): 5 TABLET ORAL at 02:19

## 2024-05-18 RX ADMIN — Medication 600 MILLIGRAM(S): at 05:41

## 2024-05-18 RX ADMIN — AMPICILLIN SODIUM AND SULBACTAM SODIUM 200 GRAM(S): 250; 125 INJECTION, POWDER, FOR SUSPENSION INTRAMUSCULAR; INTRAVENOUS at 11:49

## 2024-05-18 RX ADMIN — BUMETANIDE 2 MILLIGRAM(S): 0.25 INJECTION INTRAMUSCULAR; INTRAVENOUS at 05:41

## 2024-05-18 RX ADMIN — OXYCODONE HYDROCHLORIDE 20 MILLIGRAM(S): 5 TABLET ORAL at 09:59

## 2024-05-18 NOTE — DISCHARGE NOTE NURSING/CASE MANAGEMENT/SOCIAL WORK - NSDCPEFALRISK_GEN_ALL_CORE
For information on Fall & Injury Prevention, visit: [email or web address] OR  [email or web address] OR  https://www.cdc.gov/steadi/patient.html

## 2024-05-18 NOTE — DISCHARGE NOTE NURSING/CASE MANAGEMENT/SOCIAL WORK - NSDCVIVACCINE_GEN_ALL_CORE_FT
influenza, injectable, quadrivalent, preservative free; 04-Oct-2022 15:17; Elissa Cano (RN); [employer]; [id number] (Exp. Date: 30-Jun-2023); IntraMuscular; Deltoid Left.; 0.5 milliLiter(s); VIS (VIS Published: 06-Aug-2021, VIS Presented: 04-Oct-2022);

## 2024-05-18 NOTE — DISCHARGE NOTE NURSING/CASE MANAGEMENT/SOCIAL WORK - PATIENT PORTAL LINK FT
You can access the FollowMyHealth Patient Portal offered by [facility] by registering at the following website: [email or web address]. By joining [employer]’s FollowMyHealth portal, you will also be able to view your health information using other applications (apps) compatible with our system.

## 2024-05-20 ENCOUNTER — APPOINTMENT (OUTPATIENT)
Dept: HOME HEALTH SERVICES | Facility: HOME HEALTH | Age: 54
End: 2024-05-20

## 2024-05-20 VITALS
RESPIRATION RATE: 19 BRPM | OXYGEN SATURATION: 97 % | DIASTOLIC BLOOD PRESSURE: 80 MMHG | HEART RATE: 77 BPM | SYSTOLIC BLOOD PRESSURE: 141 MMHG

## 2024-05-20 DIAGNOSIS — L02.01 CUTANEOUS ABSCESS OF FACE: ICD-10-CM

## 2024-05-20 RX ORDER — ATORVASTATIN CALCIUM 20 MG/1
20 TABLET, FILM COATED ORAL
Qty: 1 | Refills: 3 | Status: ACTIVE | COMMUNITY
Start: 2021-09-20

## 2024-05-20 RX ORDER — ISOPROPYL ALCOHOL 70 ML/100ML
70 SWAB TOPICAL
Qty: 100 | Refills: 0 | Status: ACTIVE | COMMUNITY
Start: 2021-09-24

## 2024-05-20 RX ORDER — ALBUTEROL SULFATE 90 UG/1
108 (90 BASE) AEROSOL, METERED RESPIRATORY (INHALATION)
Qty: 1 | Refills: 11 | Status: ACTIVE | COMMUNITY
Start: 2020-10-27

## 2024-05-20 RX ORDER — OXYCODONE 20 MG/1
20 TABLET ORAL
Qty: 60 | Refills: 0 | Status: ACTIVE | COMMUNITY
Start: 2024-01-25

## 2024-05-20 RX ORDER — LISINOPRIL 5 MG/1
5 TABLET ORAL DAILY
Refills: 0 | Status: ACTIVE | COMMUNITY
Start: 2021-06-16

## 2024-05-20 RX ORDER — OXYCODONE 5 MG/1
5 TABLET ORAL
Refills: 0 | Status: DISCONTINUED | COMMUNITY
Start: 2024-03-27 | End: 2024-05-20

## 2024-05-20 RX ORDER — SPIRONOLACTONE 25 MG/1
25 TABLET ORAL
Qty: 90 | Refills: 1 | Status: ACTIVE | COMMUNITY
Start: 2022-10-28

## 2024-05-20 RX ORDER — INSULIN ASPART 100 [IU]/ML
100 INJECTION, SOLUTION INTRAVENOUS; SUBCUTANEOUS
Qty: 4 | Refills: 3 | Status: ACTIVE | COMMUNITY
Start: 2021-06-16

## 2024-05-20 RX ORDER — TIZANIDINE 4 MG/1
4 TABLET ORAL
Refills: 0 | Status: ACTIVE | COMMUNITY
Start: 2024-03-27

## 2024-05-20 RX ORDER — ACETAZOLAMIDE 125 MG/1
125 TABLET ORAL TWICE DAILY
Qty: 120 | Refills: 0 | Status: ACTIVE | COMMUNITY
Start: 2023-06-01

## 2024-05-20 RX ORDER — DIPHENHYDRAMINE HCL 50 MG/1
50 CAPSULE ORAL EVERY 8 HOURS
Refills: 0 | Status: ACTIVE | COMMUNITY
Start: 2024-03-05

## 2024-05-20 RX ORDER — PANTOPRAZOLE 40 MG/1
40 TABLET, DELAYED RELEASE ORAL
Qty: 30 | Refills: 3 | Status: ACTIVE | COMMUNITY
Start: 2021-05-06

## 2024-05-20 RX ORDER — INSULIN DEGLUDEC 100 U/ML
100 INJECTION, SOLUTION SUBCUTANEOUS AT BEDTIME
Refills: 0 | Status: ACTIVE | COMMUNITY
Start: 2024-03-05

## 2024-05-20 RX ORDER — ALBUTEROL SULFATE 2.5 MG/3ML
(2.5 MG/3ML) SOLUTION RESPIRATORY (INHALATION)
Qty: 75 | Refills: 0 | Status: ACTIVE | COMMUNITY
Start: 2020-10-27

## 2024-05-20 RX ORDER — METOCLOPRAMIDE 10 MG/1
10 TABLET ORAL
Qty: 42 | Refills: 0 | Status: ACTIVE | COMMUNITY
Start: 2024-01-25

## 2024-05-20 RX ORDER — INSULIN ASPART 100 [IU]/ML
100 INJECTION, SOLUTION INTRAVENOUS; SUBCUTANEOUS 3 TIMES DAILY
Refills: 0 | Status: ACTIVE | COMMUNITY
Start: 2024-03-05

## 2024-05-20 RX ORDER — CARVEDILOL 3.12 MG/1
3.12 TABLET, FILM COATED ORAL TWICE DAILY
Qty: 60 | Refills: 5 | Status: ACTIVE | COMMUNITY
Start: 2022-10-28

## 2024-05-20 RX ORDER — BLOOD SUGAR DIAGNOSTIC
STRIP MISCELLANEOUS
Qty: 1 | Refills: 5 | Status: ACTIVE | COMMUNITY
Start: 2021-09-20

## 2024-05-20 RX ORDER — OXYCODONE HYDROCHLORIDE 10 MG/1
10 TABLET, FILM COATED, EXTENDED RELEASE ORAL
Qty: 60 | Refills: 0 | Status: DISCONTINUED | COMMUNITY
Start: 2024-03-27 | End: 2024-05-20

## 2024-05-20 RX ORDER — INSULIN DEGLUDEC INJECTION 200 U/ML
200 INJECTION, SOLUTION SUBCUTANEOUS
Qty: 1 | Refills: 5 | Status: ACTIVE | COMMUNITY
Start: 2022-01-07

## 2024-05-20 RX ORDER — ALBUTEROL SULFATE 2.5 MG/3ML
(2.5 MG/3ML) SOLUTION RESPIRATORY (INHALATION)
Qty: 3 | Refills: 3 | Status: ACTIVE | COMMUNITY
Start: 2024-03-07

## 2024-05-20 RX ORDER — BUTALBITAL, ACETAMINOPHEN AND CAFFEINE 325; 50; 40 MG/1; MG/1; MG/1
50-325-40 TABLET ORAL EVERY 8 HOURS
Refills: 0 | Status: ACTIVE | COMMUNITY
Start: 2024-03-05

## 2024-05-20 RX ORDER — FLUTICASONE FUROATE, UMECLIDINIUM BROMIDE AND VILANTEROL TRIFENATATE 100; 62.5; 25 UG/1; UG/1; UG/1
100-62.5-25 POWDER RESPIRATORY (INHALATION)
Qty: 1 | Refills: 11 | Status: ACTIVE | COMMUNITY
Start: 2021-11-13

## 2024-05-20 RX ORDER — TIOTROPIUM BROMIDE INHALATION SPRAY 3.12 UG/1
2.5 SPRAY, METERED RESPIRATORY (INHALATION) DAILY
Refills: 0 | Status: ACTIVE | COMMUNITY
Start: 2024-03-07

## 2024-05-20 RX ORDER — SOFT LENS DISINFECTANT
SOLUTION, NON-ORAL MISCELLANEOUS
Qty: 1 | Refills: 0 | Status: ACTIVE | COMMUNITY
Start: 2020-10-27

## 2024-05-20 RX ORDER — METFORMIN HYDROCHLORIDE 1000 MG/1
1000 TABLET, COATED ORAL
Qty: 60 | Refills: 11 | Status: ACTIVE | COMMUNITY
Start: 2020-10-27

## 2024-05-20 RX ORDER — OXYCODONE 10 MG/1
10 TABLET ORAL
Refills: 0 | Status: DISCONTINUED | COMMUNITY
Start: 2024-03-27 | End: 2024-05-20

## 2024-05-20 RX ORDER — BUMETANIDE 2 MG/1
2 TABLET ORAL
Qty: 90 | Refills: 0 | Status: ACTIVE | COMMUNITY
Start: 2022-10-28

## 2024-05-20 RX ORDER — MONTELUKAST 10 MG/1
10 TABLET, FILM COATED ORAL
Qty: 30 | Refills: 2 | Status: ACTIVE | COMMUNITY
Start: 2020-10-27

## 2024-05-20 RX ORDER — AMOXICILLIN 500 MG/1
500 CAPSULE ORAL TWICE DAILY
Refills: 0 | Status: DISCONTINUED | COMMUNITY
Start: 2024-04-17 | End: 2024-05-20

## 2024-05-20 RX ORDER — NALOXONE HYDROCHLORIDE 4 MG/.1ML
4 SPRAY NASAL
Qty: 2 | Refills: 0 | Status: ACTIVE | COMMUNITY
Start: 2024-02-29

## 2024-05-20 RX ORDER — BLOOD-GLUCOSE METER
W/DEVICE KIT MISCELLANEOUS
Qty: 1 | Refills: 0 | Status: ACTIVE | COMMUNITY
Start: 2022-05-24

## 2024-05-20 RX ORDER — NALOXEGOL OXALATE 25 MG/1
25 TABLET, FILM COATED ORAL DAILY
Refills: 0 | Status: ACTIVE | COMMUNITY
Start: 2023-10-11

## 2024-05-20 RX ORDER — AMOXICILLIN AND CLAVULANATE POTASSIUM 500; 125 MG/1; MG/1
500-125 TABLET, FILM COATED ORAL
Qty: 14 | Refills: 0 | Status: DISCONTINUED | COMMUNITY
Start: 2024-04-16 | End: 2024-05-20

## 2024-05-20 RX ORDER — PEN NEEDLE, DIABETIC 29 G X1/2"
32G X 4 MM NEEDLE, DISPOSABLE MISCELLANEOUS
Qty: 180 | Refills: 3 | Status: ACTIVE | COMMUNITY
Start: 2024-03-27

## 2024-05-20 RX ORDER — CHLORHEXIDINE GLUCONATE, 0.12% ORAL RINSE 1.2 MG/ML
0.12 SOLUTION DENTAL
Qty: 6 | Refills: 3 | Status: ACTIVE | COMMUNITY
Start: 2024-04-17

## 2024-05-20 RX ORDER — PREGABALIN 100 MG/1
100 CAPSULE ORAL 3 TIMES DAILY
Refills: 0 | Status: ACTIVE | COMMUNITY
Start: 2024-03-05

## 2024-05-20 RX ORDER — PEN NEEDLE, DIABETIC 31 GX5/16"
31G X 8 MM NEEDLE, DISPOSABLE MISCELLANEOUS
Qty: 200 | Refills: 0 | Status: ACTIVE | COMMUNITY
Start: 2021-09-20

## 2024-05-21 ENCOUNTER — NON-APPOINTMENT (OUTPATIENT)
Age: 54
End: 2024-05-21

## 2024-05-28 ENCOUNTER — TRANSCRIPTION ENCOUNTER (OUTPATIENT)
Age: 54
End: 2024-05-28

## 2024-05-28 ENCOUNTER — INPATIENT (INPATIENT)
Facility: HOSPITAL | Age: 54
LOS: 5 days | Discharge: HOME HEALTH | End: 2024-06-03
Attending: STUDENT IN AN ORGANIZED HEALTH CARE EDUCATION/TRAINING PROGRAM | Admitting: STUDENT IN AN ORGANIZED HEALTH CARE EDUCATION/TRAINING PROGRAM
Payer: MEDICARE

## 2024-05-28 ENCOUNTER — APPOINTMENT (OUTPATIENT)
Dept: HOME HEALTH SERVICES | Facility: HOME HEALTH | Age: 54
End: 2024-05-28
Payer: MEDICARE

## 2024-05-28 VITALS
DIASTOLIC BLOOD PRESSURE: 80 MMHG | TEMPERATURE: 97.88 F | OXYGEN SATURATION: 95 % | RESPIRATION RATE: 16 BRPM | HEART RATE: 82 BPM | SYSTOLIC BLOOD PRESSURE: 140 MMHG

## 2024-05-28 VITALS
HEART RATE: 68 BPM | TEMPERATURE: 98 F | RESPIRATION RATE: 20 BRPM | SYSTOLIC BLOOD PRESSURE: 145 MMHG | OXYGEN SATURATION: 97 % | DIASTOLIC BLOOD PRESSURE: 77 MMHG | HEIGHT: 68 IN

## 2024-05-28 DIAGNOSIS — E66.01 MORBID (SEVERE) OBESITY DUE TO EXCESS CALORIES: ICD-10-CM

## 2024-05-28 DIAGNOSIS — Z98.890 OTHER SPECIFIED POSTPROCEDURAL STATES: Chronic | ICD-10-CM

## 2024-05-28 DIAGNOSIS — E11.49 TYPE 2 DIABETES MELLITUS WITH OTHER DIABETIC NEUROLOGICAL COMPLICATION: ICD-10-CM

## 2024-05-28 DIAGNOSIS — Z96.82 PRESENCE OF NEUROSTIMULATOR: Chronic | ICD-10-CM

## 2024-05-28 DIAGNOSIS — I50.32 CHRONIC DIASTOLIC (CONGESTIVE) HEART FAILURE: ICD-10-CM

## 2024-05-28 DIAGNOSIS — I10 ESSENTIAL (PRIMARY) HYPERTENSION: ICD-10-CM

## 2024-05-28 DIAGNOSIS — F31.81 BIPOLAR II DISORDER: ICD-10-CM

## 2024-05-28 DIAGNOSIS — Z90.49 ACQUIRED ABSENCE OF OTHER SPECIFIED PARTS OF DIGESTIVE TRACT: Chronic | ICD-10-CM

## 2024-05-28 LAB
ALBUMIN SERPL ELPH-MCNC: 4 G/DL — SIGNIFICANT CHANGE UP (ref 3.3–5)
ALP SERPL-CCNC: 148 U/L — HIGH (ref 40–120)
ALT FLD-CCNC: 24 U/L — SIGNIFICANT CHANGE UP (ref 4–33)
ANION GAP SERPL CALC-SCNC: 18 MMOL/L — HIGH (ref 7–14)
AST SERPL-CCNC: 31 U/L — SIGNIFICANT CHANGE UP (ref 4–32)
BASOPHILS # BLD AUTO: 0.04 K/UL — SIGNIFICANT CHANGE UP (ref 0–0.2)
BASOPHILS NFR BLD AUTO: 0.3 % — SIGNIFICANT CHANGE UP (ref 0–2)
BILIRUB SERPL-MCNC: 0.4 MG/DL — SIGNIFICANT CHANGE UP (ref 0.2–1.2)
BUN SERPL-MCNC: 18 MG/DL — SIGNIFICANT CHANGE UP (ref 7–23)
CALCIUM SERPL-MCNC: 9.1 MG/DL — SIGNIFICANT CHANGE UP (ref 8.4–10.5)
CHLORIDE SERPL-SCNC: 96 MMOL/L — LOW (ref 98–107)
CO2 SERPL-SCNC: 20 MMOL/L — LOW (ref 22–31)
CREAT SERPL-MCNC: 0.87 MG/DL — SIGNIFICANT CHANGE UP (ref 0.5–1.3)
EGFR: 79 ML/MIN/1.73M2 — SIGNIFICANT CHANGE UP
EOSINOPHIL # BLD AUTO: 0.16 K/UL — SIGNIFICANT CHANGE UP (ref 0–0.5)
EOSINOPHIL NFR BLD AUTO: 1.3 % — SIGNIFICANT CHANGE UP (ref 0–6)
GLUCOSE SERPL-MCNC: 247 MG/DL — HIGH (ref 70–99)
HCT VFR BLD CALC: 40.9 % — SIGNIFICANT CHANGE UP (ref 34.5–45)
HGB BLD-MCNC: 13.5 G/DL — SIGNIFICANT CHANGE UP (ref 11.5–15.5)
IANC: 7.2 K/UL — SIGNIFICANT CHANGE UP (ref 1.8–7.4)
IMM GRANULOCYTES NFR BLD AUTO: 0.2 % — SIGNIFICANT CHANGE UP (ref 0–0.9)
LYMPHOCYTES # BLD AUTO: 33.4 % — SIGNIFICANT CHANGE UP (ref 13–44)
LYMPHOCYTES # BLD AUTO: 4.04 K/UL — HIGH (ref 1–3.3)
MAGNESIUM SERPL-MCNC: 1.1 MG/DL — LOW (ref 1.6–2.6)
MCHC RBC-ENTMCNC: 29 PG — SIGNIFICANT CHANGE UP (ref 27–34)
MCHC RBC-ENTMCNC: 33 GM/DL — SIGNIFICANT CHANGE UP (ref 32–36)
MCV RBC AUTO: 88 FL — SIGNIFICANT CHANGE UP (ref 80–100)
MONOCYTES # BLD AUTO: 0.62 K/UL — SIGNIFICANT CHANGE UP (ref 0–0.9)
MONOCYTES NFR BLD AUTO: 5.1 % — SIGNIFICANT CHANGE UP (ref 2–14)
NEUTROPHILS # BLD AUTO: 7.2 K/UL — SIGNIFICANT CHANGE UP (ref 1.8–7.4)
NEUTROPHILS NFR BLD AUTO: 59.7 % — SIGNIFICANT CHANGE UP (ref 43–77)
NRBC # BLD: 0 /100 WBCS — SIGNIFICANT CHANGE UP (ref 0–0)
NRBC # FLD: 0 K/UL — SIGNIFICANT CHANGE UP (ref 0–0)
PLATELET # BLD AUTO: 531 K/UL — HIGH (ref 150–400)
POTASSIUM SERPL-MCNC: 4.7 MMOL/L — SIGNIFICANT CHANGE UP (ref 3.5–5.3)
POTASSIUM SERPL-SCNC: 4.7 MMOL/L — SIGNIFICANT CHANGE UP (ref 3.5–5.3)
PROT SERPL-MCNC: 8.2 G/DL — SIGNIFICANT CHANGE UP (ref 6–8.3)
RBC # BLD: 4.65 M/UL — SIGNIFICANT CHANGE UP (ref 3.8–5.2)
RBC # FLD: 13.2 % — SIGNIFICANT CHANGE UP (ref 10.3–14.5)
SODIUM SERPL-SCNC: 134 MMOL/L — LOW (ref 135–145)
WBC # BLD: 12.09 K/UL — HIGH (ref 3.8–10.5)
WBC # FLD AUTO: 12.09 K/UL — HIGH (ref 3.8–10.5)

## 2024-05-28 PROCEDURE — 99495 TRANSJ CARE MGMT MOD F2F 14D: CPT

## 2024-05-28 PROCEDURE — 70487 CT MAXILLOFACIAL W/DYE: CPT | Mod: 26,MC

## 2024-05-28 PROCEDURE — 99285 EMERGENCY DEPT VISIT HI MDM: CPT

## 2024-05-28 PROCEDURE — 73620 X-RAY EXAM OF FOOT: CPT | Mod: 26,RT

## 2024-05-28 RX ORDER — MORPHINE SULFATE 50 MG/1
4 CAPSULE, EXTENDED RELEASE ORAL ONCE
Refills: 0 | Status: DISCONTINUED | OUTPATIENT
Start: 2024-05-28 | End: 2024-05-28

## 2024-05-28 RX ORDER — LIDOCAINE 4 G/100G
2 CREAM TOPICAL ONCE
Refills: 0 | Status: COMPLETED | OUTPATIENT
Start: 2024-05-28 | End: 2024-05-28

## 2024-05-28 RX ORDER — LIDOCAINE 4 G/100G
1 CREAM TOPICAL ONCE
Refills: 0 | Status: COMPLETED | OUTPATIENT
Start: 2024-05-28 | End: 2024-05-28

## 2024-05-28 RX ORDER — INSULIN LISPRO 100/ML
12 VIAL (ML) SUBCUTANEOUS ONCE
Refills: 0 | Status: COMPLETED | OUTPATIENT
Start: 2024-05-28 | End: 2024-05-28

## 2024-05-28 RX ORDER — METHOCARBAMOL 500 MG/1
1500 TABLET, FILM COATED ORAL ONCE
Refills: 0 | Status: COMPLETED | OUTPATIENT
Start: 2024-05-28 | End: 2024-05-28

## 2024-05-28 RX ORDER — KETOROLAC TROMETHAMINE 30 MG/ML
15 SYRINGE (ML) INJECTION ONCE
Refills: 0 | Status: DISCONTINUED | OUTPATIENT
Start: 2024-05-28 | End: 2024-05-28

## 2024-05-28 RX ORDER — SODIUM CHLORIDE 9 MG/ML
1000 INJECTION INTRAMUSCULAR; INTRAVENOUS; SUBCUTANEOUS ONCE
Refills: 0 | Status: COMPLETED | OUTPATIENT
Start: 2024-05-28 | End: 2024-05-28

## 2024-05-28 RX ADMIN — LIDOCAINE 2 PATCH: 4 CREAM TOPICAL at 21:03

## 2024-05-28 RX ADMIN — MORPHINE SULFATE 4 MILLIGRAM(S): 50 CAPSULE, EXTENDED RELEASE ORAL at 21:02

## 2024-05-28 RX ADMIN — Medication 15 MILLIGRAM(S): at 21:02

## 2024-05-28 RX ADMIN — Medication 12 UNIT(S): at 23:25

## 2024-05-28 RX ADMIN — LIDOCAINE 1 PATCH: 4 CREAM TOPICAL at 20:43

## 2024-05-28 RX ADMIN — MORPHINE SULFATE 4 MILLIGRAM(S): 50 CAPSULE, EXTENDED RELEASE ORAL at 22:37

## 2024-05-28 RX ADMIN — METHOCARBAMOL 1500 MILLIGRAM(S): 500 TABLET, FILM COATED ORAL at 20:42

## 2024-05-28 RX ADMIN — SODIUM CHLORIDE 1000 MILLILITER(S): 9 INJECTION INTRAMUSCULAR; INTRAVENOUS; SUBCUTANEOUS at 21:01

## 2024-05-28 RX ADMIN — Medication 100 MILLIGRAM(S): at 23:10

## 2024-05-28 NOTE — ED ADULT NURSE REASSESSMENT NOTE - NS ED NURSE REASSESS COMMENT FT1
Pt a&ox4, states the pain went away briefly, however, it has returned. JACKSON Ward made aware. Airway is patent, speaking in clear and coherent sentences.

## 2024-05-28 NOTE — ED PROVIDER NOTE - OBJECTIVE STATEMENT
54-y/o F with PMHx of DM, HTN, intercostal neuralgia, complex regional pain syndrome with spinal cord stimulator presents to the ED for right sided back pain. Pt had recent admission at OSH for similar pain flare. Pt states she has had persistent pain since discharge and is typical for where her pain normally is. Pt also states that she was found to have a right facial abscess that she was on IV abx for in the hospital. She states that no one sent her abx to the pharmacy after discharge so she has not been taking anything for the infection and the right facial swelling has gotten worse. Pt also endorses a new right great toe ulcer. Denies fevers, HA, vision changes, CP, SOB, abd pain, n/v/d/c, dysuria, hematuria.

## 2024-05-28 NOTE — ED ADULT TRIAGE NOTE - AS TEMP SITE
Caller would like to let you know that he will be attending a pain mgmt class in [place] on 9/14/18 with Dr Henriquez.    Patient Name: Rick Ye    Callback Number: [phone]  Best Availability: anytime (but no call back necessary)    Did you confirm the message with the caller?: yes    Thank you,  Erin Galvez   oral

## 2024-05-28 NOTE — ED PROVIDER NOTE - ATTENDING CONTRIBUTION TO CARE
Attending note:   After face to face evaluation of this patient, I concur with above noted hx, pe, and care plan for this patient.  Telles: 54-year-old female with type 2 diabetes, hypertension patient complex regional pain syndrome.  Patient presents ED now with multiple complaints.  Patient recently was admitted for a right facial abscess and was given IV antibiotics at that time.  Patient was sent home on oral antibiotics but was unable to fill it and now states that right facial swelling has gotten worse.  Patient also notes new right foot pain and persistent back pain.  On exam there is significant right-sided facial edema and minimal erythema and significant tenderness from the right stigmatic arch to the mandible of the jaw with tenderness.  However there is no significant trismus.  Patient has no stridor noted.  Tongue is normal in size.  Lungs are clear and heart is regular rate and rhythm.  There is tenderness to the back but no deformities or step-offs or erythema is noted.  Rest of exam is as noted above.  Will check labs and give pain medication and IV antibiotics.  Will do CT to evaluate for any facial abscesses developed or this is all just cellulitis.  Either way patient will require IV antibiotics and admission.  Patient to get pain management while inpatient.

## 2024-05-28 NOTE — ED PROVIDER NOTE - CLINICAL SUMMARY MEDICAL DECISION MAKING FREE TEXT BOX
54-y/o F with PMHx of DM, HTN, intercostal neuralgia, complex regional pain syndrome with spinal cord stimulator presents to the ED for right sided back pain. Similar to previous pain flares. + right facial swelling and warmth. Differentials include but not limited to pain flare, facial abscess. Plan for labs, CT max/face, analgesia. Dispo likely admission for IV abx and pain control.

## 2024-05-28 NOTE — ED ADULT NURSE NOTE - OBJECTIVE STATEMENT
Pt a&ox4 ambulatory with cane c/o right flank pain and right toe. Pmh complex regional pain syndrome, HTN, DM2, CHF, asthma. On assessment pt well appearing. Respirations even and unlabored. Pt denies chest pain, shortness of breath, nausea, vomiting, urinary symptoms. Ultrasound IV inserted by provider. Pt medicated for pain. Pending xray Pt a&ox4 ambulatory with cane c/o right flank pain down to right toe and right sided facial sweliing. Pmh complex regional pain syndrome, HTN, DM2, CHF, asthma. On assessment pt well appearing. Respirations even and unlabored. Pt denies chest pain, shortness of breath, nausea, vomiting, urinary symptoms. Ultrasound 20G IV inserted by provider. Pt medicated for pain. Pending xray, CT.

## 2024-05-28 NOTE — ED CLERICAL - NS ED CLERK NOTE PRE-ARRIVAL INFORMATION; ADDITIONAL PRE-ARRIVAL INFORMATION
What Type Of Note Output Would You Prefer (Optional)?: Standard Output
What Is The Reason For Today's Visit?: Full Body Skin Examination
What Is The Reason For Today's Visit? (Being Monitored For X): concerning skin lesions on a periodic basis
This patient is enrolled in the [facility] Program and receives comprehensive home-based primary care.    - To obtain additional clinical information , or to discuss any questions or concerns, you can call the [facility] team at [phone], 24 hours a day.    - If discharged, this patient will be followed up by the [facility] team within 2 days.    - If this patient requires admission, please use the hospitalist service.

## 2024-05-28 NOTE — ED ADULT NURSE NOTE - NSFALLRISKINTERV_ED_ALL_ED

## 2024-05-28 NOTE — ED PROVIDER NOTE - PHYSICAL EXAMINATION
Constitutional: VS reviewed. Alert and orientedx3, well appearing, no apparent distress  HEENT: Atraumatic, EOMI, PERRL, + right facial swelling with TTP   CV: RRR  Lungs: Clear and equal bilaterally, no wheezes, rales or crackles  Abdomen: Soft, nondistended, nontender  MSK: No deformities. + TTP of right mid thoracic back.   Skin: Warm and dry. Very small pinpoint ulcer on tip of right great toe. No surrounding erythema or drainage from area.  Neuro: Strength and sensation intact  Lymph: No pitting edema in extremities.

## 2024-05-28 NOTE — ED PROVIDER NOTE - PROGRESS NOTE DETAILS
JACKSON Mukherjee- Received pt as sign out, labs with elevated glucose, anion gap 18, no gas drawn. CT face concerning for phlegmon changes, + R facial swelling and tenderness on exam. Pt had R cheek mass removed x1 month ago with plastics at [facility]. Plastic surgery consulted. IV fluids and 12 units admelog given, waiting repeat labs before admission. JACKSON Mukherjee= Repeat glucose 187. gap 14. not acidotic. will admit to hospitalist.

## 2024-05-29 ENCOUNTER — NON-APPOINTMENT (OUTPATIENT)
Age: 54
End: 2024-05-29

## 2024-05-29 ENCOUNTER — TRANSCRIPTION ENCOUNTER (OUTPATIENT)
Age: 54
End: 2024-05-29

## 2024-05-29 DIAGNOSIS — L02.91 CUTANEOUS ABSCESS, UNSPECIFIED: ICD-10-CM

## 2024-05-29 DIAGNOSIS — M54.9 DORSALGIA, UNSPECIFIED: ICD-10-CM

## 2024-05-29 DIAGNOSIS — E11.9 TYPE 2 DIABETES MELLITUS WITHOUT COMPLICATIONS: ICD-10-CM

## 2024-05-29 DIAGNOSIS — I10 ESSENTIAL (PRIMARY) HYPERTENSION: ICD-10-CM

## 2024-05-29 DIAGNOSIS — E11.621 TYPE 2 DIABETES MELLITUS WITH FOOT ULCER: ICD-10-CM

## 2024-05-29 DIAGNOSIS — G90.50 COMPLEX REGIONAL PAIN SYNDROME I, UNSPECIFIED: ICD-10-CM

## 2024-05-29 PROBLEM — E66.01 MORBID OBESITY: Status: ACTIVE | Noted: 2020-10-27

## 2024-05-29 PROBLEM — E11.49 DIABETES MELLITUS TYPE 2 WITH NEUROLOGICAL MANIFESTATIONS: Status: ACTIVE | Noted: 2020-10-27

## 2024-05-29 PROBLEM — I50.32 CHRONIC DIASTOLIC CONGESTIVE HEART FAILURE: Status: ACTIVE | Noted: 2022-10-28

## 2024-05-29 PROBLEM — F31.81 BIPOLAR 2 DISORDER: Status: ACTIVE | Noted: 2020-10-27

## 2024-05-29 LAB
A1C WITH ESTIMATED AVERAGE GLUCOSE RESULT: 8.2 % — HIGH (ref 4–5.6)
ADD ON TEST-SPECIMEN IN LAB: SIGNIFICANT CHANGE UP
ALBUMIN SERPL ELPH-MCNC: 3.5 G/DL — SIGNIFICANT CHANGE UP (ref 3.3–5)
ALP SERPL-CCNC: 127 U/L — HIGH (ref 40–120)
ALT FLD-CCNC: 17 U/L — SIGNIFICANT CHANGE UP (ref 4–33)
ANION GAP SERPL CALC-SCNC: 14 MMOL/L — SIGNIFICANT CHANGE UP (ref 7–14)
ANION GAP SERPL CALC-SCNC: 15 MMOL/L — HIGH (ref 7–14)
AST SERPL-CCNC: 13 U/L — SIGNIFICANT CHANGE UP (ref 4–32)
BASE EXCESS BLDV CALC-SCNC: 0.1 MMOL/L — SIGNIFICANT CHANGE UP (ref -2–3)
BILIRUB SERPL-MCNC: 0.4 MG/DL — SIGNIFICANT CHANGE UP (ref 0.2–1.2)
BLOOD GAS VENOUS COMPREHENSIVE RESULT: SIGNIFICANT CHANGE UP
BUN SERPL-MCNC: 18 MG/DL — SIGNIFICANT CHANGE UP (ref 7–23)
BUN SERPL-MCNC: 18 MG/DL — SIGNIFICANT CHANGE UP (ref 7–23)
CALCIUM SERPL-MCNC: 8.6 MG/DL — SIGNIFICANT CHANGE UP (ref 8.4–10.5)
CALCIUM SERPL-MCNC: 8.7 MG/DL — SIGNIFICANT CHANGE UP (ref 8.4–10.5)
CHLORIDE BLDV-SCNC: 101 MMOL/L — SIGNIFICANT CHANGE UP (ref 96–108)
CHLORIDE SERPL-SCNC: 101 MMOL/L — SIGNIFICANT CHANGE UP (ref 98–107)
CHLORIDE SERPL-SCNC: 99 MMOL/L — SIGNIFICANT CHANGE UP (ref 98–107)
CO2 BLDV-SCNC: 27.9 MMOL/L — HIGH (ref 22–26)
CO2 SERPL-SCNC: 21 MMOL/L — LOW (ref 22–31)
CO2 SERPL-SCNC: 23 MMOL/L — SIGNIFICANT CHANGE UP (ref 22–31)
CREAT SERPL-MCNC: 0.99 MG/DL — SIGNIFICANT CHANGE UP (ref 0.5–1.3)
CREAT SERPL-MCNC: 1.17 MG/DL — SIGNIFICANT CHANGE UP (ref 0.5–1.3)
CRP SERPL-MCNC: 8.8 MG/L — HIGH
EGFR: 55 ML/MIN/1.73M2 — LOW
EGFR: 68 ML/MIN/1.73M2 — SIGNIFICANT CHANGE UP
ERYTHROCYTE [SEDIMENTATION RATE] IN BLOOD: 31 MM/HR — HIGH (ref 4–25)
ESTIMATED AVERAGE GLUCOSE: 189 — SIGNIFICANT CHANGE UP
GAS PNL BLDV: 133 MMOL/L — LOW (ref 136–145)
GAS PNL BLDV: SIGNIFICANT CHANGE UP
GLUCOSE BLDC GLUCOMTR-MCNC: 129 MG/DL — HIGH (ref 70–99)
GLUCOSE BLDC GLUCOMTR-MCNC: 137 MG/DL — HIGH (ref 70–99)
GLUCOSE BLDC GLUCOMTR-MCNC: 151 MG/DL — HIGH (ref 70–99)
GLUCOSE BLDC GLUCOMTR-MCNC: 190 MG/DL — HIGH (ref 70–99)
GLUCOSE BLDC GLUCOMTR-MCNC: 197 MG/DL — HIGH (ref 70–99)
GLUCOSE BLDC GLUCOMTR-MCNC: 263 MG/DL — HIGH (ref 70–99)
GLUCOSE BLDV-MCNC: 187 MG/DL — HIGH (ref 70–99)
GLUCOSE SERPL-MCNC: 145 MG/DL — HIGH (ref 70–99)
GLUCOSE SERPL-MCNC: 187 MG/DL — HIGH (ref 70–99)
HCO3 BLDV-SCNC: 26 MMOL/L — SIGNIFICANT CHANGE UP (ref 22–29)
HCT VFR BLD CALC: 35.4 % — SIGNIFICANT CHANGE UP (ref 34.5–45)
HCT VFR BLDA CALC: 36 % — SIGNIFICANT CHANGE UP (ref 34.5–46.5)
HGB BLD CALC-MCNC: 12 G/DL — SIGNIFICANT CHANGE UP (ref 11.7–16.1)
HGB BLD-MCNC: 11.7 G/DL — SIGNIFICANT CHANGE UP (ref 11.5–15.5)
LACTATE BLDV-MCNC: 2.6 MMOL/L — HIGH (ref 0.5–2)
LACTATE SERPL-SCNC: 1.6 MMOL/L — SIGNIFICANT CHANGE UP (ref 0.5–2)
MAGNESIUM SERPL-MCNC: 1.2 MG/DL — LOW (ref 1.6–2.6)
MCHC RBC-ENTMCNC: 29 PG — SIGNIFICANT CHANGE UP (ref 27–34)
MCHC RBC-ENTMCNC: 33.1 GM/DL — SIGNIFICANT CHANGE UP (ref 32–36)
MCV RBC AUTO: 87.8 FL — SIGNIFICANT CHANGE UP (ref 80–100)
MRSA PCR RESULT.: SIGNIFICANT CHANGE UP
NRBC # BLD: 0 /100 WBCS — SIGNIFICANT CHANGE UP (ref 0–0)
NRBC # FLD: 0 K/UL — SIGNIFICANT CHANGE UP (ref 0–0)
PCO2 BLDV: 49 MMHG — SIGNIFICANT CHANGE UP (ref 39–52)
PH BLDV: 7.34 — SIGNIFICANT CHANGE UP (ref 7.32–7.43)
PHOSPHATE SERPL-MCNC: 4.7 MG/DL — HIGH (ref 2.5–4.5)
PLATELET # BLD AUTO: 495 K/UL — HIGH (ref 150–400)
PO2 BLDV: 34 MMHG — SIGNIFICANT CHANGE UP (ref 25–45)
POTASSIUM BLDV-SCNC: 5.4 MMOL/L — HIGH (ref 3.5–5.1)
POTASSIUM SERPL-MCNC: 3.5 MMOL/L — SIGNIFICANT CHANGE UP (ref 3.5–5.3)
POTASSIUM SERPL-MCNC: 3.9 MMOL/L — SIGNIFICANT CHANGE UP (ref 3.5–5.3)
POTASSIUM SERPL-SCNC: 3.5 MMOL/L — SIGNIFICANT CHANGE UP (ref 3.5–5.3)
POTASSIUM SERPL-SCNC: 3.9 MMOL/L — SIGNIFICANT CHANGE UP (ref 3.5–5.3)
PROT SERPL-MCNC: 7.1 G/DL — SIGNIFICANT CHANGE UP (ref 6–8.3)
RBC # BLD: 4.03 M/UL — SIGNIFICANT CHANGE UP (ref 3.8–5.2)
RBC # FLD: 13.2 % — SIGNIFICANT CHANGE UP (ref 10.3–14.5)
S AUREUS DNA NOSE QL NAA+PROBE: SIGNIFICANT CHANGE UP
SAO2 % BLDV: 55.7 % — LOW (ref 67–88)
SODIUM SERPL-SCNC: 136 MMOL/L — SIGNIFICANT CHANGE UP (ref 135–145)
SODIUM SERPL-SCNC: 137 MMOL/L — SIGNIFICANT CHANGE UP (ref 135–145)
WBC # BLD: 9.9 K/UL — SIGNIFICANT CHANGE UP (ref 3.8–10.5)
WBC # FLD AUTO: 9.9 K/UL — SIGNIFICANT CHANGE UP (ref 3.8–10.5)

## 2024-05-29 PROCEDURE — 99223 1ST HOSP IP/OBS HIGH 75: CPT

## 2024-05-29 PROCEDURE — 99024 POSTOP FOLLOW-UP VISIT: CPT

## 2024-05-29 PROCEDURE — 72131 CT LUMBAR SPINE W/O DYE: CPT | Mod: 26

## 2024-05-29 RX ORDER — TIZANIDINE 4 MG/1
1 TABLET ORAL
Refills: 0 | DISCHARGE

## 2024-05-29 RX ORDER — DEXTROSE 10 % IN WATER 10 %
125 INTRAVENOUS SOLUTION INTRAVENOUS ONCE
Refills: 0 | Status: DISCONTINUED | OUTPATIENT
Start: 2024-05-29 | End: 2024-06-03

## 2024-05-29 RX ORDER — LISINOPRIL 2.5 MG/1
5 TABLET ORAL DAILY
Refills: 0 | Status: DISCONTINUED | OUTPATIENT
Start: 2024-05-29 | End: 2024-06-03

## 2024-05-29 RX ORDER — SENNA PLUS 8.6 MG/1
2 TABLET ORAL AT BEDTIME
Refills: 0 | Status: DISCONTINUED | OUTPATIENT
Start: 2024-05-29 | End: 2024-06-03

## 2024-05-29 RX ORDER — BUMETANIDE 0.25 MG/ML
2 INJECTION INTRAMUSCULAR; INTRAVENOUS DAILY
Refills: 0 | Status: DISCONTINUED | OUTPATIENT
Start: 2024-05-29 | End: 2024-06-03

## 2024-05-29 RX ORDER — MONTELUKAST 4 MG/1
10 TABLET, CHEWABLE ORAL DAILY
Refills: 0 | Status: DISCONTINUED | OUTPATIENT
Start: 2024-05-29 | End: 2024-06-03

## 2024-05-29 RX ORDER — DEXTROSE 50 % IN WATER 50 %
15 SYRINGE (ML) INTRAVENOUS ONCE
Refills: 0 | Status: DISCONTINUED | OUTPATIENT
Start: 2024-05-29 | End: 2024-06-03

## 2024-05-29 RX ORDER — LIDOCAINE HYDROCHLORIDE AND EPINEPHRINE 10; 10 MG/ML; UG/ML
20 INJECTION, SOLUTION INFILTRATION; PERINEURAL ONCE
Refills: 0 | Status: DISCONTINUED | OUTPATIENT
Start: 2024-05-29 | End: 2024-06-03

## 2024-05-29 RX ORDER — HYDROMORPHONE HYDROCHLORIDE 2 MG/ML
1 INJECTION INTRAMUSCULAR; INTRAVENOUS; SUBCUTANEOUS ONCE
Refills: 0 | Status: DISCONTINUED | OUTPATIENT
Start: 2024-05-29 | End: 2024-05-29

## 2024-05-29 RX ORDER — TIRZEPATIDE 15 MG/.5ML
2.5 INJECTION, SOLUTION SUBCUTANEOUS
Refills: 0 | DISCHARGE

## 2024-05-29 RX ORDER — GLUCAGON INJECTION, SOLUTION 0.5 MG/.1ML
1 INJECTION, SOLUTION SUBCUTANEOUS ONCE
Refills: 0 | Status: DISCONTINUED | OUTPATIENT
Start: 2024-05-29 | End: 2024-06-03

## 2024-05-29 RX ORDER — MONTELUKAST 4 MG/1
1 TABLET, CHEWABLE ORAL
Refills: 0 | DISCHARGE

## 2024-05-29 RX ORDER — INSULIN LISPRO 100/ML
VIAL (ML) SUBCUTANEOUS AT BEDTIME
Refills: 0 | Status: DISCONTINUED | OUTPATIENT
Start: 2024-05-29 | End: 2024-06-03

## 2024-05-29 RX ORDER — INSULIN DETEMIR 100/ML (3)
54 INSULIN PEN (ML) SUBCUTANEOUS AT BEDTIME
Refills: 0 | Status: DISCONTINUED | OUTPATIENT
Start: 2024-05-29 | End: 2024-05-31

## 2024-05-29 RX ORDER — LANOLIN ALCOHOL/MO/W.PET/CERES
6 CREAM (GRAM) TOPICAL AT BEDTIME
Refills: 0 | Status: DISCONTINUED | OUTPATIENT
Start: 2024-05-29 | End: 2024-06-03

## 2024-05-29 RX ORDER — DEXTROSE 50 % IN WATER 50 %
25 SYRINGE (ML) INTRAVENOUS ONCE
Refills: 0 | Status: DISCONTINUED | OUTPATIENT
Start: 2024-05-29 | End: 2024-06-03

## 2024-05-29 RX ORDER — BUMETANIDE 0.25 MG/ML
1 INJECTION INTRAMUSCULAR; INTRAVENOUS
Refills: 0 | DISCHARGE

## 2024-05-29 RX ORDER — METFORMIN HYDROCHLORIDE 850 MG/1
1 TABLET ORAL
Refills: 0 | DISCHARGE

## 2024-05-29 RX ORDER — AMOXICILLIN AND CLAVULANATE POTASSIUM 875; 125 MG/1; MG/1
875-125 TABLET, COATED ORAL
Qty: 20 | Refills: 0 | Status: COMPLETED | COMMUNITY
Start: 2024-05-20 | End: 2024-05-29

## 2024-05-29 RX ORDER — ALBUTEROL 90 UG/1
2 AEROSOL, METERED ORAL EVERY 6 HOURS
Refills: 0 | Status: DISCONTINUED | OUTPATIENT
Start: 2024-05-29 | End: 2024-06-03

## 2024-05-29 RX ORDER — OXYCODONE HYDROCHLORIDE 5 MG/1
20 TABLET ORAL THREE TIMES A DAY
Refills: 0 | Status: DISCONTINUED | OUTPATIENT
Start: 2024-05-29 | End: 2024-05-30

## 2024-05-29 RX ORDER — NALOXEGOL OXALATE 12.5 MG/1
25 TABLET, FILM COATED ORAL DAILY
Refills: 0 | Status: DISCONTINUED | OUTPATIENT
Start: 2024-05-29 | End: 2024-06-03

## 2024-05-29 RX ORDER — MAGNESIUM SULFATE 500 MG/ML
2 VIAL (ML) INJECTION ONCE
Refills: 0 | Status: COMPLETED | OUTPATIENT
Start: 2024-05-29 | End: 2024-05-29

## 2024-05-29 RX ORDER — POTASSIUM CHLORIDE 20 MEQ
40 PACKET (EA) ORAL ONCE
Refills: 0 | Status: COMPLETED | OUTPATIENT
Start: 2024-05-29 | End: 2024-05-29

## 2024-05-29 RX ORDER — CHLORHEXIDINE GLUCONATE 213 G/1000ML
1 SOLUTION TOPICAL DAILY
Refills: 0 | Status: DISCONTINUED | OUTPATIENT
Start: 2024-05-29 | End: 2024-06-03

## 2024-05-29 RX ORDER — ATORVASTATIN CALCIUM 80 MG/1
20 TABLET, FILM COATED ORAL AT BEDTIME
Refills: 0 | Status: DISCONTINUED | OUTPATIENT
Start: 2024-05-29 | End: 2024-06-03

## 2024-05-29 RX ORDER — ENOXAPARIN SODIUM 100 MG/ML
40 INJECTION SUBCUTANEOUS EVERY 12 HOURS
Refills: 0 | Status: DISCONTINUED | OUTPATIENT
Start: 2024-05-29 | End: 2024-05-29

## 2024-05-29 RX ORDER — OXYCODONE HYDROCHLORIDE 5 MG/1
15 TABLET ORAL ONCE
Refills: 0 | Status: DISCONTINUED | OUTPATIENT
Start: 2024-05-29 | End: 2024-05-29

## 2024-05-29 RX ORDER — CARVEDILOL PHOSPHATE 80 MG/1
3.12 CAPSULE, EXTENDED RELEASE ORAL EVERY 12 HOURS
Refills: 0 | Status: DISCONTINUED | OUTPATIENT
Start: 2024-05-29 | End: 2024-06-03

## 2024-05-29 RX ORDER — ACETAMINOPHEN 500 MG
650 TABLET ORAL EVERY 6 HOURS
Refills: 0 | Status: DISCONTINUED | OUTPATIENT
Start: 2024-05-29 | End: 2024-06-01

## 2024-05-29 RX ORDER — ENOXAPARIN SODIUM 100 MG/ML
60 INJECTION SUBCUTANEOUS EVERY 12 HOURS
Refills: 0 | Status: DISCONTINUED | OUTPATIENT
Start: 2024-05-29 | End: 2024-06-03

## 2024-05-29 RX ORDER — SODIUM CHLORIDE 9 MG/ML
1000 INJECTION, SOLUTION INTRAVENOUS
Refills: 0 | Status: DISCONTINUED | OUTPATIENT
Start: 2024-05-29 | End: 2024-06-03

## 2024-05-29 RX ORDER — PANTOPRAZOLE SODIUM 20 MG/1
40 TABLET, DELAYED RELEASE ORAL
Refills: 0 | Status: DISCONTINUED | OUTPATIENT
Start: 2024-05-29 | End: 2024-06-03

## 2024-05-29 RX ORDER — INSULIN LISPRO 100/ML
VIAL (ML) SUBCUTANEOUS
Refills: 0 | Status: DISCONTINUED | OUTPATIENT
Start: 2024-05-29 | End: 2024-06-03

## 2024-05-29 RX ORDER — INSULIN ASPART 100 [IU]/ML
12 INJECTION, SOLUTION SUBCUTANEOUS
Refills: 0 | DISCHARGE

## 2024-05-29 RX ORDER — NALOXEGOL OXALATE 12.5 MG/1
1 TABLET, FILM COATED ORAL
Refills: 0 | DISCHARGE

## 2024-05-29 RX ORDER — AMOXICILLIN AND CLAVULANATE POTASSIUM 875; 125 MG/1; MG/1
875-125 TABLET, COATED ORAL
Qty: 20 | Refills: 0 | Status: COMPLETED | COMMUNITY
Start: 2024-05-12 | End: 2024-05-29

## 2024-05-29 RX ORDER — LIDOCAINE 4 G/100G
1 CREAM TOPICAL DAILY
Refills: 0 | Status: DISCONTINUED | OUTPATIENT
Start: 2024-05-29 | End: 2024-06-03

## 2024-05-29 RX ORDER — INSULIN LISPRO 100/ML
12 VIAL (ML) SUBCUTANEOUS
Refills: 0 | Status: DISCONTINUED | OUTPATIENT
Start: 2024-05-29 | End: 2024-05-31

## 2024-05-29 RX ORDER — POLYETHYLENE GLYCOL 3350 17 G/17G
17 POWDER, FOR SOLUTION ORAL DAILY
Refills: 0 | Status: DISCONTINUED | OUTPATIENT
Start: 2024-05-29 | End: 2024-06-03

## 2024-05-29 RX ORDER — METHOCARBAMOL 500 MG/1
750 TABLET, FILM COATED ORAL EVERY 8 HOURS
Refills: 0 | Status: DISCONTINUED | OUTPATIENT
Start: 2024-05-29 | End: 2024-06-03

## 2024-05-29 RX ORDER — METHOCARBAMOL 500 MG/1
750 TABLET, FILM COATED ORAL EVERY 8 HOURS
Refills: 0 | Status: DISCONTINUED | OUTPATIENT
Start: 2024-05-29 | End: 2024-05-29

## 2024-05-29 RX ORDER — AMPICILLIN SODIUM AND SULBACTAM SODIUM 250; 125 MG/ML; MG/ML
3 INJECTION, POWDER, FOR SUSPENSION INTRAMUSCULAR; INTRAVENOUS EVERY 6 HOURS
Refills: 0 | Status: DISCONTINUED | OUTPATIENT
Start: 2024-05-29 | End: 2024-06-01

## 2024-05-29 RX ORDER — DEXTROSE 50 % IN WATER 50 %
12.5 SYRINGE (ML) INTRAVENOUS ONCE
Refills: 0 | Status: DISCONTINUED | OUTPATIENT
Start: 2024-05-29 | End: 2024-06-03

## 2024-05-29 RX ORDER — ACETAMINOPHEN 500 MG
650 TABLET ORAL EVERY 6 HOURS
Refills: 0 | Status: DISCONTINUED | OUTPATIENT
Start: 2024-05-29 | End: 2024-05-29

## 2024-05-29 RX ORDER — ATORVASTATIN CALCIUM 80 MG/1
1 TABLET, FILM COATED ORAL
Refills: 0 | DISCHARGE

## 2024-05-29 RX ADMIN — MONTELUKAST 10 MILLIGRAM(S): 4 TABLET, CHEWABLE ORAL at 13:05

## 2024-05-29 RX ADMIN — CARVEDILOL PHOSPHATE 3.12 MILLIGRAM(S): 80 CAPSULE, EXTENDED RELEASE ORAL at 18:14

## 2024-05-29 RX ADMIN — AMPICILLIN SODIUM AND SULBACTAM SODIUM 200 GRAM(S): 250; 125 INJECTION, POWDER, FOR SUSPENSION INTRAMUSCULAR; INTRAVENOUS at 13:05

## 2024-05-29 RX ADMIN — Medication 40 MILLIEQUIVALENT(S): at 09:14

## 2024-05-29 RX ADMIN — OXYCODONE HYDROCHLORIDE 20 MILLIGRAM(S): 5 TABLET ORAL at 11:26

## 2024-05-29 RX ADMIN — Medication 100 MILLIGRAM(S): at 21:16

## 2024-05-29 RX ADMIN — LISINOPRIL 5 MILLIGRAM(S): 2.5 TABLET ORAL at 14:47

## 2024-05-29 RX ADMIN — OXYCODONE HYDROCHLORIDE 20 MILLIGRAM(S): 5 TABLET ORAL at 18:12

## 2024-05-29 RX ADMIN — Medication 1: at 18:13

## 2024-05-29 RX ADMIN — ENOXAPARIN SODIUM 60 MILLIGRAM(S): 100 INJECTION SUBCUTANEOUS at 18:13

## 2024-05-29 RX ADMIN — OXYCODONE HYDROCHLORIDE 20 MILLIGRAM(S): 5 TABLET ORAL at 10:26

## 2024-05-29 RX ADMIN — OXYCODONE HYDROCHLORIDE 20 MILLIGRAM(S): 5 TABLET ORAL at 22:50

## 2024-05-29 RX ADMIN — AMPICILLIN SODIUM AND SULBACTAM SODIUM 200 GRAM(S): 250; 125 INJECTION, POWDER, FOR SUSPENSION INTRAMUSCULAR; INTRAVENOUS at 23:55

## 2024-05-29 RX ADMIN — OXYCODONE HYDROCHLORIDE 20 MILLIGRAM(S): 5 TABLET ORAL at 23:30

## 2024-05-29 RX ADMIN — Medication 1: at 13:06

## 2024-05-29 RX ADMIN — METHOCARBAMOL 750 MILLIGRAM(S): 500 TABLET, FILM COATED ORAL at 21:16

## 2024-05-29 RX ADMIN — LIDOCAINE 1 PATCH: 4 CREAM TOPICAL at 06:02

## 2024-05-29 RX ADMIN — Medication 6 MILLIGRAM(S): at 22:50

## 2024-05-29 RX ADMIN — HYDROMORPHONE HYDROCHLORIDE 1 MILLIGRAM(S): 2 INJECTION INTRAMUSCULAR; INTRAVENOUS; SUBCUTANEOUS at 05:15

## 2024-05-29 RX ADMIN — LIDOCAINE 2 PATCH: 4 CREAM TOPICAL at 06:03

## 2024-05-29 RX ADMIN — OXYCODONE HYDROCHLORIDE 15 MILLIGRAM(S): 5 TABLET ORAL at 08:43

## 2024-05-29 RX ADMIN — METHOCARBAMOL 750 MILLIGRAM(S): 500 TABLET, FILM COATED ORAL at 14:47

## 2024-05-29 RX ADMIN — LIDOCAINE 2 PATCH: 4 CREAM TOPICAL at 08:55

## 2024-05-29 RX ADMIN — HYDROMORPHONE HYDROCHLORIDE 1 MILLIGRAM(S): 2 INJECTION INTRAMUSCULAR; INTRAVENOUS; SUBCUTANEOUS at 01:56

## 2024-05-29 RX ADMIN — Medication 12 UNIT(S): at 18:13

## 2024-05-29 RX ADMIN — SENNA PLUS 2 TABLET(S): 8.6 TABLET ORAL at 21:16

## 2024-05-29 RX ADMIN — AMPICILLIN SODIUM AND SULBACTAM SODIUM 200 GRAM(S): 250; 125 INJECTION, POWDER, FOR SUSPENSION INTRAMUSCULAR; INTRAVENOUS at 18:20

## 2024-05-29 RX ADMIN — Medication 100 MILLIGRAM(S): at 13:05

## 2024-05-29 RX ADMIN — HYDROMORPHONE HYDROCHLORIDE 1 MILLIGRAM(S): 2 INJECTION INTRAMUSCULAR; INTRAVENOUS; SUBCUTANEOUS at 04:46

## 2024-05-29 RX ADMIN — OXYCODONE HYDROCHLORIDE 20 MILLIGRAM(S): 5 TABLET ORAL at 19:12

## 2024-05-29 RX ADMIN — LIDOCAINE 1 PATCH: 4 CREAM TOPICAL at 08:55

## 2024-05-29 RX ADMIN — CHLORHEXIDINE GLUCONATE 1 APPLICATION(S): 213 SOLUTION TOPICAL at 13:13

## 2024-05-29 RX ADMIN — OXYCODONE HYDROCHLORIDE 15 MILLIGRAM(S): 5 TABLET ORAL at 07:43

## 2024-05-29 RX ADMIN — Medication 1: at 22:41

## 2024-05-29 RX ADMIN — ATORVASTATIN CALCIUM 20 MILLIGRAM(S): 80 TABLET, FILM COATED ORAL at 21:16

## 2024-05-29 RX ADMIN — Medication 25 GRAM(S): at 09:14

## 2024-05-29 RX ADMIN — Medication 650 MILLIGRAM(S): at 23:55

## 2024-05-29 RX ADMIN — Medication 54 UNIT(S): at 22:42

## 2024-05-29 NOTE — DISCHARGE NOTE PROVIDER - ATTENDING DISCHARGE PHYSICAL EXAMINATION:
Patient seen and examined at bedside on day of discharge (6/3/24). Patient continues to endorse vague pain throughout her body. Does note improvement with tylenol and methocarbamol. Denies any fever, chills, nausea or vomiting. Tolerating PO. Endorses overall improvement in chin/neck pain. VSS, afebrile. No leukocytosis noted on labs. Exam largely non-focal. Patient is otherwise medically optimized for discharge home with PCP and pain management follow up.

## 2024-05-29 NOTE — REASON FOR VISIT
[Post-hospitalization from ___ Hospital] : Post-hospitalization from [unfilled] Hospital [Admitted on: ___] : The patient was admitted on [unfilled] [Discharged on ___] : discharged on [unfilled] [Discharge Summary] : discharge summary [Discharge Med List] : discharge medication list [Med Reconciliation] : medication reconciliation has been completed [Patient Contacted By: ____] : and contacted by [unfilled] [FreeTextEntry2] : Hospital Course:  Discharge Date 18-May-2024  Admission Date 08-May-2024 06:30  Reason for Admission Complex Regional Pain syndrome flare  Hospital Course   55 y/o F, from home, lives alone, ambulates with cane/walker/wheelchair, with  PMHx of DM, HTN, HLD, CHF, complex regional pain syndrome of right back  intercostal muscles s/p spinal cord stimulator (Mar 2023) who presented with  right flank pain for 2-3 days after she ran out of her home oxycodone  prescribed from prior hospitalization. Since then, she had been taking a  significant amount of ibuprofen (800mg 6x/day) without any effect. She also had  been unable to follow-up with a pain specialist. She was admitted for pain  control. She was started on dilaudid IV with improvement. Pain medicine was  consulted, recommended discontinuing IV dilaudid, the pain was confirmed to be  bearable without the medication. Pain medicine provided providers office for  further follow up after discharge.    Of note, the patient had a lipoma resected from her right cheek on 4/15/24 by  Dr. Frank Ba after which she completed a course of antibiotics. She  then developed drainage and went to see her plastic surgeon on 5/2/24 where  they placed a needle to help drain out the fluid. Since then, she feels the  pain and swelling to her cheek area have worsened and she had a fever to 100.6  prior to admission. Maxillofacial CT for the cheek lesion showed soft tissue  swelling with air containing collection overlying the right malar body and the  premaxillary space consistent with an  air-containing abscess. Medicine team  discussed with IR, and they recommended no intervention as the "abscess" seen  on the CT maxillofacial was all air and there was no fluid to drain. The team  of the plastic surgeon Dr. Ba notified of the case, and they recommend  for the patient to come to the office for possible drainage. ID consulted,  recommended Unasyn with transition to Augmentin 875/125 mg q12 hrs PO for total  14 days after discharge.    Pain management recommending outpatient follow up. Patient also can follow up  with Dr. Hay- [phone] (pain), upon discharge from hospital.  Patient is medically optimized for discharge home with outpatient follow up.      Med Reconciliation:  Medication Reconciliation Status Admission Reconciliation is Completed  Discharge Reconciliation is Completed  Discharge Medications acetaminophen 500 mg oral tablet: 2 tab(s) orally every 8  hours as needed for Moderate Pain (4 - 6)  albuterol 90 mcg/inh inhalation aerosol: 2 puff(s) inhaled every 6 hours as  needed for Bronchospasm  amoxicillin-clavulanate 875 mg-125 mg oral tablet: 875 milligram(s) orally 2  times a day take 10 more days until 5/22  atorvastatin 20 mg oral tablet: 1 tab(s) orally once a day (at bedtime)  bumetanide 2 mg oral tablet: 1 tab(s) orally once a day  carvedilol 3.125 mg oral tablet: 1 tab(s) orally every 12 hours  Lidocare Pain Relief Patch 4% topical film: Apply topically to affected area  once a day apply to lower back, right flank and mid back  lisinopril 5 mg oral tablet: 1 tab(s) orally once a day  metFORMIN 1000 mg oral tablet: 1 tab(s) orally 2 times a day  methocarbamol 750 mg oral tablet: 1 tab(s) orally every 8 hours  montelukast 10 mg oral tablet: 1 tab(s) orally once a day  Movantik 25 mg oral tablet: 1 tab(s) orally once a day  NovoLOG FlexPen 100 units/mL injectable solution: 12 unit(s) injectable 3 times  a day (before meals)  pantoprazole 40 mg oral delayed release tablet: 1 tab(s) orally once a day  (before a meal)  pregabalin 100 mg oral capsule: 1 cap(s) orally 3 times a day  Tresiba 100 units/mL subcutaneous solution: 54 international unit(s)  subcutaneous once a day (at bedtime)  Zanaflex 4 mg oral tablet: 1 tab(s) orally 3 times a day as needed for  muscle  spasm  ,  ,  Care Plan/Procedures:  Discharge Diagnoses, Assessment and Plan of Treatment PRINCIPAL DISCHARGE  DIAGNOSIS  Diagnosis: Complex regional pain syndrome type 1  Assessment and Plan of Treatment: you have a history of CRPS type 1, diagnosed  8 yrs ago. Used to follow with pain management however left due to  complications with physician, has not be able to find another pain doctor  since.  you presented with intractable right flank pain, radiating in the back area. Pt  states this is day 3 of her flare up  -CT A/P no obstructive uropathy, but noted for right kidney stone, IUD,  moderate stool, right flank spinal stimulator  continue pain medications as directed  with bowel regimen: senna and miralax  Follow up with your PCP outpatient.  You were given phone number for pain management provider to follow up with  outpatient, Dr. Hay, [phone].        SECONDARY DISCHARGE DIAGNOSES  Diagnosis: Cheek swelling  Assessment and Plan of Treatment: You had recent lipoma resection 4/15 and  [facility]. Checked up on last week, had needle placed in cheek, no further drainage  mild fever at home, now with slightly increased swelling  continue  viscous lidocaine q4h as needed  continue pain medication as needed as directed.  Continue follow up with your plastic surgery doctor  as scheduled for possible  additional drainage.  complete antibiotic Augmentin total 14 day course until      Diagnosis: DM (diabetes mellitus)  Assessment and Plan of Treatment: HgA1C 7.7 this admission.  You take Tresiba 54u qhs and 12u Novolog TID and Metformin 1000mg BID at home  continue home medicaitons and monitor blood glucose before meals and at  bedtime.  Follow up with your PCP and/or endocrine specialist.  Make sure you get your HgA1c checked every three months.  If you take oral diabetes medications, check your blood glucose two times a  day.  If you take insulin, check your blood glucose before meals and at bedtime.  It's important not to skip any meals.  Keep a log of your blood glucose results and always take it with you to your  doctor appointments.  Keep a list of your current medications including injectables and over the  counter medications and bring this medication list with you to all your doctor  appointments.  If you have not seen your ophthalmologist this year call for appointment.  Check your feet daily for redness, sores, or openings. Do not self treat. If no  improvement in two days call your primary care physician for an appointment.  Low blood sugar (hypoglycemia) is a blood sugar below 70mg/dl. Check your blood  sugar if you feel signs/symptoms of hypoglycemia. If your blood sugar is below  70 take 15 grams of carbohydrates (ex 4 oz of apple juice, 3-4 glucose tablets,  or 4-6 oz of regular soda) wait 15 minutes and repeat blood sugar to make sure  it comes up above 70.  If your blood sugar is above 70 and you are due for a  meal, have a meal.  If you are not due for a meal have a snack.  This snack  helps keeps your blood sugar at a safe range.      Diagnosis: HTN (hypertension)  Assessment and Plan of Treatment: a history of HTN, takes Bumex 2mg daily and  coreg 3.125mg BID and lisinopril 5mg at home  as above.  Low salt diet  Activity as tolerated.  Take all medication as prescribed.  Follow up with your medical doctor for routine blood pressure monitoring at  your next visit.  Notify your doctor if you have any of the following symptoms:  Dizziness, Lightheadedness, Blurry vision, Headache, Chest pain, Shortness of  breath      Diagnosis: Chronic CHF  Assessment and Plan of Treatment: You have a history of CHF, takes Bumex 2mg  daily and coreg 3.125mg BID and lisinopril 5mg daily  - hold lisinopril for now, as patient BP is controlled, intermittent elevated  due to pain and pt requires IV Dilaudid  - restart lisinopril on dc  - C/w Home meds.  Weigh yourself daily.  If you gain 3lbs in 3 days, or 5lbs in a week call your Health Care Provider.  Do not eat or drink foods containing more than 2000mg of salt (sodium) in your  diet every day.  Call your Health Care Provider if you have any swelling or increased swelling  in your feet, ankles, and/or stomach.  Take all of your medication as directed.  If you become dizzy call your Health  Care Provider.      Diagnosis: Anemia  Assessment and Plan of Treatment: Symptoms to report, bleeding, palpitations,  fatigue, pale skin, cold skin, dizziness. Take medications as ordered by PCP      Diagnosis: Electrolyte imbalance  Assessment and Plan of Treatment: You were treated for low potassium and  magnesium level during hospital stay. Eat a balanced diet.  Follow up with your PCP to monitor blood electrolytes in 1-2 weeks after  discharge.    Diagnosis: Nephrolithiasis  Assessment and Plan of Treatment: CT abdomen/pelvis resulted - Punctate  nonobstructing stone in the interpolar region of the right kidney. No  hydronephrosis or perinephric inflammation.  Continue follow up with your PCP for  outpatient urology follow up.    Diagnosis: Periodontal abscess  Assessment and Plan of Treatment: See plan above cheek swelling

## 2024-05-29 NOTE — CONSULT NOTE ADULT - ATTENDING COMMENTS
I examined the patient  no cellulitis  CT reviewed- no abscess  probed area of mild fullness with 18 gauge and no pus aspirated  no rubor or calor  no open wounds inside mouth    VII intact currently except branch to levator labii     cont to observe  cont abx

## 2024-05-29 NOTE — COUNSELING
[Obese (BMI >29.9)] : Obese - BMI >29.9 [DASH diet given] : DASH diet given [Non - Smoker] : non-smoker [Use assistive device to avoid falls] : use assistive device to avoid falls [Remove clutter and unsafe carpeting to avoid falls] : remove clutter and unsafe carpeting to avoid falls [Mammogram] : Mammogram [Improve mobility] : improve mobility [Improve weight] : improve weight [Improve pain control] : improve pain control [Decrease stress] : decrease stress [Decrease hospital use] : decrease hospital use [Minimize unnecessary interventions] : minimize unnecessary interventions [Completed Medical Orders for Life-Sustaining Treatment] : completed medical orders for life-sustaining treatment [Full Code] : Code Status: Full Code [No Limitations] : Treatment Guidelines: No limitations [Long Term Intubation] : Intubation: Long term intubation [[facility] Date: _____] [facility] [unfilled] [_____] : HCP: [unfilled]

## 2024-05-29 NOTE — H&P ADULT - ASSESSMENT
53 y/o F with pmh of DM type 2, HTN, intercostal neuralgia, complex regional pain syndrome with spinal cord stimulator p/w pain in R flank, face, and R toe wound.

## 2024-05-29 NOTE — H&P ADULT - NSHPLABSRESULTS_GEN_ALL_CORE
13.5   12.09 )-----------( 531      ( 28 May 2024 20:39 )             40.9     136  |  99  |  18  ----------------------------<  187<H>     05-29  3.9   |  23  |  1.17    Ca    8.7      29 May 2024 01:00  Mg     1.10     05-28    TPro  7.1  /  Alb  3.5  /  TBili  0.4  /  DBili  x   /  AST  13  /  ALT  17  /  AlkPhos  127<H>  05-29        01:00 - VBG - pH: 7.34  | pCO2: 49    | pO2: 34    | Lactate: 2.6                            < from: CT Lumbar Spine No Cont (05.29.24 @ 02:50) >    No evidence for acute lumbar fracture.    Lumbar spondylosis.    < end of copied text >    < from: CT Maxillofacial w/ IV Cont (05.28.24 @ 21:39) >    Similar-appearing soft tissue stranding in the premalar subcutaneous fat   right cheek which extends into the buccinator fat with resolved air   collection centrally. Tiny amount of ill-defined fluid within the   inflamed region without drainable fluid collection. Findings favor   phlegmonous changes, if there is clinical signsfor infection.    < end of copied text >

## 2024-05-29 NOTE — PATIENT PROFILE ADULT - FALL HARM RISK - HARM RISK INTERVENTIONS
Assistance OOB with selected safe patient handling equipment/Communicate Risk of Fall with Harm to all staff/Discuss with provider need for PT consult/Monitor gait and stability/Provide patient with walking aids - walker, cane, crutches/Reinforce activity limits and safety measures with patient and family/Review medications for side effects contributing to fall risk/Sit up slowly, dangle for a short time, stand at bedside before walking/Tailored Fall Risk Interventions/Toileting schedule using arm’s reach rule for commode and bathroom/Visual Cue: Yellow wristband and red socks/Bed in lowest position, wheels locked, appropriate side rails in place/Call bell, personal items and telephone in reach/Instruct patient to call for assistance before getting out of bed or chair/Non-slip footwear when patient is out of bed/Ida to call system/Physically safe environment - no spills, clutter or unnecessary equipment/Purposeful Proactive Rounding/Room/bathroom lighting operational, light cord in reach

## 2024-05-29 NOTE — HEALTH RISK ASSESSMENT
[Independent] : managing finances [Some assistance needed] : using transportation [No falls in past year] : Patient reported no falls in the past year [HRA Reviewed] : Health Risk Assessment reviewed

## 2024-05-29 NOTE — H&P ADULT - NSHPREVIEWOFSYSTEMS_GEN_ALL_CORE
Review of Systems:   CONSTITUTIONAL: No fever or chills  EYES: No eye pain, visual disturbances, or discharge  ENMT:  No difficulty hearing, no throat pain.  Facial pain and swelling  NECK: No pain or stiffness  RESPIRATORY: No cough, No shortness of breath  CARDIOVASCULAR: No chest pain, palpitations, dizziness, or leg swelling  GASTROINTESTINAL: No abdominal pain, nausea, vomiting or diarrhea  GENITOURINARY: No dysuria, or hematuria  NEUROLOGICAL: No headaches, weakness, or numbness  SKIN: R toe ulcer with minimal surrounding erythema    LYMPH NODES: No enlarged glands  ENDOCRINE: No heat or cold intolerance  MUSCULOSKELETAL: No joint pain or swelling  PSYCHIATRIC: No depression or anxiety  HEME/LYMPH: No easy bruising, or bleeding  ALLERGY AND IMMUNOLOGIC: No hives or eczema

## 2024-05-29 NOTE — DISCHARGE NOTE PROVIDER - NSDCCPTREATMENT_GEN_ALL_CORE_FT
PRINCIPAL PROCEDURE  Procedure: CT maxillofacial area  Findings and Treatment: IMPRESSION:  Similar-appearing soft tissue stranding in the premalar subcutaneous fat   right cheek which extends into the buccinator fat with resolved air   collection centrally. Tiny amount of ill-defined fluid within the   inflamed region without drainable fluid collection. Findings favor   phlegmonous changes, if there is clinical signs for infection.  --- End of Report ---      SECONDARY PROCEDURE  Procedure: CT lumbar spine  Findings and Treatment:   IMPRESSION:  1. No vertebral fracture is recognized.  2. Mild lumbar degenerative disc disease  3. Sacroiliac osteoarthritis

## 2024-05-29 NOTE — H&P ADULT - PROBLEM SELECTOR PLAN 2
Facial phlegmon.  Possible infection  - continue Clindamycin  - will need to consult pt's plastic surgeon, Dr Ba, in am for further recs Facial phlegmon.  Possible infection  - will treat with Unasyn  - will need to consult pt's plastic surgeon, Dr Ba, in am for further recs

## 2024-05-29 NOTE — DISCHARGE NOTE PROVIDER - CARE PROVIDER_API CALL
BARB SINGH  [facility] [place] [address],    Phone: [phone]  Fax: [phone]  Established Patient  Follow Up Time: Routine

## 2024-05-29 NOTE — PATIENT PROFILE ADULT - NSPROPTRIGHTCAREGIVER_GEN_A_NUR
Quality 431: Preventive Care And Screening: Unhealthy Alcohol Use - Screening: Patient not identified as an unhealthy alcohol user when screened for unhealthy alcohol use using a systematic screening method yes Quality 137: Melanoma: Continuity Of Care - Recall System: Patient information entered into a recall system that includes: target date for the next exam specified AND a process to follow up with patients regarding missed or unscheduled appointments Detail Level: Detailed Quality 110: Preventive Care And Screening: Influenza Immunization: Influenza Immunization Administered during Influenza season Quality 111:Pneumonia Vaccination Status For Older Adults: Patient did not receive any pneumococcal conjugate or polysaccharide vaccine on or after their 60th birthday and before the end of the measurement period Quality 138: Melanoma: Coordination Of Care: A treatment plan was communicated to the physicians providing continuing care within one month of diagnosis outlining: diagnosis, tumor thickness and a plan for surgery or alternate care.

## 2024-05-29 NOTE — H&P ADULT - PROBLEM SELECTOR PLAN 1
s/p IV Dilaudid in ED and on floor.  Will transition to oral oxycodone  - continue methocarbamol   - will need outpatient pain mgmt f/u

## 2024-05-29 NOTE — DISCHARGE NOTE PROVIDER - NSCORESITESY/N_GEN_A_CORE_RD
TC to pt to remind her that INR was due on 1/3/22. Pt aware and request we send standing order to [facility], as her order is expiring. Order put into chart for INR standing order and faxed to [facility]. F#[phone], P#[phone].   No

## 2024-05-29 NOTE — DISCHARGE NOTE PROVIDER - NSDCCPCAREPLAN_GEN_ALL_CORE_FT
PRINCIPAL DISCHARGE DIAGNOSIS  Diagnosis: Facial infection  Assessment and Plan of Treatment:       SECONDARY DISCHARGE DIAGNOSES  Diagnosis: Complex regional pain syndrome I  Assessment and Plan of Treatment:      PRINCIPAL DISCHARGE DIAGNOSIS  Diagnosis: Facial infection  Assessment and Plan of Treatment: You received IV antibiotics, and then transitioned to oral antibiotics to complete 2 weeks at home.      SECONDARY DISCHARGE DIAGNOSES  Diagnosis: Complex regional pain syndrome I  Assessment and Plan of Treatment: You received your home oxycodone, tylenol, toradol. A list of pain specialists was provided to you for outpatient follow up.     PRINCIPAL DISCHARGE DIAGNOSIS  Diagnosis: Facial infection  Assessment and Plan of Treatment: You received IV antibiotics, and then transitioned to oral antibiotics to complete 2 weeks at home.      SECONDARY DISCHARGE DIAGNOSES  Diagnosis: Complex regional pain syndrome I  Assessment and Plan of Treatment: You received your home oxycodone, tylenol, toradol. A list of pain specialists was provided to you for outpatient follow up.  Your methocarbamol requires a prior authorization (flexeril is the preferred drug).  We submitted for authorization.  If this is obtained [facility] will call you.  If not continue to take flexeril as ordered (DO NOT TAKE TFLEXERIL ANS METHOCARBAMOL).  if your prior authrozation is denied your primary care provider will need to reattempt     PRINCIPAL DISCHARGE DIAGNOSIS  Diagnosis: Facial infection  Assessment and Plan of Treatment: You received IV antibiotics, and then transitioned to oral antibiotics to complete 2 weeks at home.      SECONDARY DISCHARGE DIAGNOSES  Diagnosis: Complex regional pain syndrome I  Assessment and Plan of Treatment: You received your home oxycodone, tylenol, toradol. A list of pain specialists was provided to you for outpatient follow up.  Your methocarbamol requires a prior authorization (flexeril is the preferred drug). We submitted for authorization.  If this is obtained [facility] will call you. If not continue to take flexeril as ordered (DO NOT TAKE BOTH FLEXERIL AND METHOCARBAMOL AT THE SAME TIME).  If your prior authorization is denied your primary care provider will need to reattempt authorization.

## 2024-05-29 NOTE — H&P ADULT - PROBLEM SELECTOR PLAN 5
Possible diabetic foot ulcer  - wound care consult  - inpt vs outpatient podiatry eval  - x-ray without evidence of OM.  Will check ESR, CRP

## 2024-05-29 NOTE — H&P ADULT - NSHPPHYSICALEXAM_GEN_ALL_CORE
Vital Signs Last 24 Hrs  T(C): 36.7 (29 May 2024 04:30), Max: 36.9 (28 May 2024 22:26)  T(F): 98 (29 May 2024 04:30), Max: 98.4 (28 May 2024 22:26)  HR: 58 (29 May 2024 04:30) (58 - 68)  BP: 131/73 (29 May 2024 04:30) (131/73 - 149/90)  BP(mean): --  RR: 17 (29 May 2024 04:30) (16 - 20)  SpO2: 97% (29 May 2024 04:30) (97% - 97%)    Parameters below as of 29 May 2024 04:30  Patient On (Oxygen Delivery Method): room air        PHYSICAL EXAM:  GENERAL: No Acute Distress  EYES: conjunctiva and sclera clear  ENMT: Moist mucous membranes.  R facial swelling and tenderness.  NECK: Supple  PULMONARY: Clear to auscultation bilaterally  CARDIAC: Regular rate and rhythm; No murmurs, rubs, or gallops  GASTROINTESTINAL: Abdomen soft, Nontender, Nondistended; Bowel sounds normal  EXTREMITIES:   No clubbing, cyanosis, or pedal edema  PSYCH: Normal Affect, Normal Behavior  NEUROLOGY:   - Mental status A&O x 3  - R lower CN VII palsy  SKIN: small ulcer of R first toe with mild edema sourrounding  MUSCULOSKELETAL: No joint swelling

## 2024-05-29 NOTE — PATIENT PROFILE ADULT - NSPROSPHOSPCHAPLAINYN_GEN_A_NUR
Verbal orders received by admitting MD to leave patient's insulin pump on until patient goes to surgery. yes

## 2024-05-29 NOTE — DISCHARGE NOTE PROVIDER - NSDCMRMEDTOKEN_GEN_ALL_CORE_FT
acetaminophen 500 mg oral tablet: 2 tab(s) orally every 8 hours as needed for Moderate Pain (4 - 6)  albuterol 90 mcg/inh inhalation aerosol: 2 puff(s) inhaled every 6 hours as needed for Bronchospasm  atorvastatin 20 mg oral tablet: 1 tab(s) orally once a day (at bedtime)  bumetanide 2 mg oral tablet: 1 tab(s) orally once a day  carvedilol 3.125 mg oral tablet: 1 tab(s) orally every 12 hours  Lidocare Pain Relief Patch 4% topical film: Apply topically to affected area once a day apply to lower back, right flank and mid back  lisinopril 5 mg oral tablet: 1 tab(s) orally once a day  metFORMIN 1000 mg oral tablet: 1 tab(s) orally 2 times a day  methocarbamol 750 mg oral tablet: 1 tab(s) orally every 8 hours  montelukast 10 mg oral tablet: 1 tab(s) orally once a day  Movantik 25 mg oral tablet: 1 tab(s) orally once a day  NovoLOG FlexPen 100 units/mL injectable solution: 12 unit(s) injectable 3 times a day (before meals)  oxyCODONE 20 mg oral tablet: 1 tab(s) orally 3 times a day as needed for  severe pain  pantoprazole 40 mg oral delayed release tablet: 1 tab(s) orally once a day (before a meal)  pregabalin 100 mg oral capsule: 1 cap(s) orally 3 times a day  tirzepatide 2.5 mg/0.5 mL subcutaneous solution: 2.5 milligram(s) subcutaneously  Tresiba 100 units/mL subcutaneous solution: 54 international unit(s) subcutaneous once a day (at bedtime)   acetaminophen 500 mg oral tablet: 2 tab(s) orally every 8 hours as needed for Moderate Pain (4 - 6)  albuterol 90 mcg/inh inhalation aerosol: 2 puff(s) inhaled every 6 hours as needed for Bronchospasm  atorvastatin 20 mg oral tablet: 1 tab(s) orally once a day (at bedtime)  bumetanide 2 mg oral tablet: 1 tab(s) orally once a day  carvedilol 3.125 mg oral tablet: 1 tab(s) orally every 12 hours  Lidocare Pain Relief Patch 4% topical film: Apply topically to affected area once a day apply to lower back, right flank and mid back  lisinopril 5 mg oral tablet: 1 tab(s) orally once a day  metFORMIN 1000 mg oral tablet: 1 tab(s) orally 2 times a day  methocarbamol 750 mg oral tablet: 1 tab(s) orally every 8 hours  methocarbamol 750 mg oral tablet: 1 tab(s) orally every 8 hours  montelukast 10 mg oral tablet: 1 tab(s) orally once a day  Movantik 25 mg oral tablet: 1 tab(s) orally once a day  NovoLOG FlexPen 100 units/mL injectable solution: 12 unit(s) injectable 3 times a day (before meals)  oxyCODONE 20 mg oral tablet: 1 tab(s) orally 3 times a day as needed for  severe pain  pantoprazole 40 mg oral delayed release tablet: 1 tab(s) orally once a day (before a meal)  pregabalin 100 mg oral capsule: 1 cap(s) orally 3 times a day  tirzepatide 2.5 mg/0.5 mL subcutaneous solution: 2.5 milligram(s) subcutaneously  Tresiba 100 units/mL subcutaneous solution: 54 international unit(s) subcutaneous once a day (at bedtime)   albuterol 90 mcg/inh inhalation aerosol: 2 puff(s) inhaled every 6 hours as needed for Bronchospasm  amoxicillin-clavulanate 875 mg-125 mg oral tablet: 1 tab(s) orally 2 times a day  atorvastatin 20 mg oral tablet: 1 tab(s) orally once a day (at bedtime)  bumetanide 2 mg oral tablet: 1 tab(s) orally once a day  carvedilol 3.125 mg oral tablet: 1 tab(s) orally every 12 hours  cyclobenzaprine 10 mg oral tablet: 1 tab(s) orally 3 times a day as needed for spasm MDD: 3  Lidocare Pain Relief Patch 4% topical film: Apply topically to affected area once a day apply to lower back, right flank and mid back  lisinopril 5 mg oral tablet: 1 tab(s) orally once a day  metFORMIN 1000 mg oral tablet: 1 tab(s) orally 2 times a day  montelukast 10 mg oral tablet: 1 tab(s) orally once a day  Movantik 25 mg oral tablet: 1 tab(s) orally once a day  NovoLOG FlexPen 100 units/mL injectable solution: 12 unit(s) injectable 3 times a day (before meals)  oxyCODONE 20 mg oral tablet: 1 tab(s) orally 3 times a day as needed for  severe pain MDD: 3 tab  pantoprazole 40 mg oral delayed release tablet: 1 tab(s) orally once a day (before a meal)  pregabalin 100 mg oral capsule: 1 cap(s) orally 3 times a day  tirzepatide 2.5 mg/0.5 mL subcutaneous solution: 2.5 milligram(s) subcutaneously  Tresiba 100 units/mL subcutaneous solution: 54 international unit(s) subcutaneous once a day (at bedtime)

## 2024-05-29 NOTE — CHART NOTE - NSCHARTNOTEFT_GEN_A_CORE
[id number] COVERAGE.    Notified by RN, pt requesting something for pain. Per RN, pt has hx of chronic pain, last received IVP Dilaudid at 1:56AM, which helped as pt states IVP Morphine ineffective. iSTOP performed (Reference #: [id number]), last prescription     Practitioner Count: 4  Pharmacy Count: 2  Current Opioid Prescriptions: 1  Current Benzodiazepine Prescriptions: 0  Current Stimulant Prescriptions: 0      Patient Demographic Information (PDI)       PDI	First Name	Last Name	Birth Date	Gender	Street Address	[facility] Code  A	Fauzia Linares	1970	Female	[address] [facility]	[place]	[address]  B	Fauzia Uribeon	1970	Female	[address]	[facility]	[place]	[address]  C	Fauzia Uribeon	1970	Female	[address]	[facility]	[place]	[address]  D	Fauzia Uribeon	1970	Female	[address]	[facility]	[place]	[address]  E	Fauzia Uribeon	1970	Female	[address]	[place]	[address]  F	Fauzia Linares	1970	Female	[address] [facility]	[place]	[address]  G	Fauzia Uribeon	1970	Female	[address]	[facility]	[place]	[address]    Prescription Information      PDI Filter:    PDI	My Rx	Current Rx	Drug Type	Rx Written	Rx Dispensed	Drug	Quantity	Days Supply	Prescriber Name	Prescriber CROW #	Payment Method	Dispenser  A	N	N		10/06/2023	10/07/2023	pregabalin 100 mg capsule	90	30	Tc, Reba	[id number]	Medicare	[facility]  A	N	N	O	09/28/2023	10/01/2023	oxycodone hcl (ir) 15 mg tab	30	5	Tc, Reba	[id number]	Medicare	[facility]  A	N	N	O	09/27/2023	09/27/2023	oxycodone hcl (ir) 15 mg tab	30	8	Tc, Reba	[id number]	Medicare	[facility]  A	N	N	O	09/10/2023	09/10/2023	oxycodone hcl (ir) 15 mg tab	30	5	Tc, Reba	[id number]	Medicare	[facility]  A	N	N		09/06/2023	09/06/2023	pregabalin 100 mg capsule	90	30	Tc, Reba	[id number]	Medicare	[facility]  A	N	N	O	08/27/2023	08/28/2023	oxycodone hcl (ir) 15 mg tab	30	5	Tc, Reba	[id number]	Medicare	[facility]  A	N	N	O	08/12/2023	08/13/2023	oxycodone hcl (ir) 15 mg tab	30	5	Tc, Reba	[id number]	Medicare	[facility]  A	N	N		08/04/2023	08/05/2023	pregabalin 100 mg capsule	90	30	Tc, Reba	[id number]	Medicare	[facility]  A	N	N		07/21/2023	07/22/2023	pregabalin 100 mg capsule	42	14	Tc, Reba	[id number]	Medicare	[facility]  A	N	N	O	07/16/2023	07/16/2023	oxycodone hcl (ir) 15 mg tab	30	7	Tc, Reba	[id number]	Medicare	[facility]  A	N	N	O	06/30/2023	07/01/2023	oxycodone hcl (ir) 15 mg tab	42	10	Tc, Reba	[id number]	Medicare	[facility]  A	N	N		06/19/2023	06/20/2023	pregabalin 100 mg capsule	90	30	Tc, Reba	[id number]	Medicare	[facility]  A	N	N	O	06/06/2023	06/07/2023	oxycodone hcl (ir) 15 mg tab	42	7	Tc, Reba	[id number]	Medicare	[facility]  B	N	N		02/08/2024	02/09/2024	pregabalin 100 mg capsule	42	7	Alba Jasso M	[id number]	Insurance	[facility]  B	N	N	O	02/08/2024	02/09/2024	hydromorphone 4 mg tablet	20	5	Alba Jasso M	[id number]	Insurance	[facility]  B	N	N		01/25/2024	01/26/2024	pregabalin 100 mg capsule	90	30	Vinny Maldonado MD	[id number]	Insurance	[facility]  B	N	N	O	01/25/2024	01/26/2024	oxycodone hcl (ir) 20 mg tab	60	30	Vinny Maldonado MD	[id number]	Insurance	[facility]  C	N	N	O	12/29/2023	12/29/2023	oxycodone hcl (ir) 15 mg tab	12	3	Christopher Montiel	[id number]	Insurance	[facility]	N	N		12/29/2023	12/29/2023	butalbital-acetaminophen-caffeine -40 mg tablet	53	8	Christopher Montiel	[id number]	[facility]	N	N	O	10/12/2023	10/14/2023	oxycodone hcl (ir) 15 mg tab	28	7	Naomi Ybarra	[id number]	[id number]  D	N	Y		04/03/2024	05/06/2024	pregabalin 100 mg capsule	90	30	Vinny Maldonado MD	[id number]	Insurance	[facility]  D	N	Y	O	04/30/2024	05/02/2024	oxycodone hcl (ir) 20 mg tab	60	30	Vinny Maldonado MD	[id number]	Insurance	[facility]  D	N	N	O	04/16/2024	04/17/2024	oxycodone hcl (ir) 5 mg tablet	10	3	Frank Ba	[id number]	Insurance	[facility]  D	N	N		04/03/2024	04/04/2024	pregabalin 100 mg capsule	90	30	Vinny Maldonado MD	[id number]	Insurance	[facility]  D	N	N	O	03/04/2024	03/05/2024	hydromorphone 4 mg tablet	20	5	Bonny Bryant	[id number]	Insurance	[facility]  E	N	N	O	03/26/2024	03/26/2024	oxycodone hcl (ir) 5 mg tablet	30	5	Quinones, Elizabeth RIBEIROC	[id number]	Insurance	[facility]  E	N	N	O	03/26/2024	03/26/2024	oxycodone hcl (ir) 10 mg tab	30	5	Quinones, Elizabeth RIBEIROC	[id number]	Insurance	[facility]  E	N	N	O	03/26/2024	03/26/2024	oxycontin er 10 mg tablet	10	5	Quinones, Elizabeth BRANTLEY-C	[id number]	Insurance	[facility]  F	N	N		09/28/2023	10/02/2023	pregabalin 100 mg capsule	30	10	Reba Montez	[id number]	Insurance	[id number]  G	N	N	O	11/28/2023	11/30/2023	oxycodone hcl (ir) 20 mg tab	60	30	Vinny Maldonado MD	[id number]	Insurance	[id number]  G	N	N	O	11/16/2023	11/18/2023	oxycodone-acetaminophen  mg tab	30	15	Vinny Maldonado MD	[id number]	Insurance	[id number]  G	N	N		11/16/2023	11/18/2023	pregabalin 100 mg capsule	90	30	Vinny Maldonado MD	[id number]	Insurance	[id number]  G	N	N		09/28/2023	11/01/2023	pregabalin 100 mg capsule	30	10	Reba Montez	[id number]	Insurance	[id number] COVERAGE.    Notified by RN, pt requesting something for pain. Per RN, pt has hx of chronic pain, last received IVP Dilaudid at 1:56AM, which helped as pt states IVP Morphine ineffective. iSTOP performed (Reference #: [id number]), most recent prescription from 4/30/24, for 30day supply of oxycodone hcl IR 20mg BID. Will order additional 1mg IVP Dilaudid x1 for breakthrough at this time, followed by Oxycodone HCL IR 15mg PO x1 to be given 1-2hours after dose.     Rosina PAZ  [facility] Medicine [id number]    Practitioner Count: 4  Pharmacy Count: 2  Current Opioid Prescriptions: 1  Current Benzodiazepine Prescriptions: 0  Current Stimulant Prescriptions: 0      Patient Demographic Information (PDI)       PDI	First Name	Last Name	Birth Date	Gender	Street Address	[facility]  A	Fauzia Linares	1970	Female	[address] [facility]	[address]  B	Fauzia Uribeon	1970	Female	[address]	[facility]	[place]	[address]  C	Fauzia Dahlberton	1970	Female	[address]	[facility]	[place]	[address]  D	Fauzia Dahlberton	1970	Female	[address]	[facility]	[place]	[address]  E	Fauzia Uribeon	1970	Female	[address]	[place]	[address]  F	Fauzia Dahlberton	1970	Female	[address] [facility]	[place]	[address]  G	Fauzia Dahlberton	1970	Female	[address]	[facility]	[place]	[address]    Prescription Information      PDI Filter:    PDI	My Rx	Current Rx	Drug Type	Rx Written	Rx Dispensed	Drug	Quantity	Days Supply	Prescriber Name	Prescriber CROW #	Payment Method	Dispenser  A	N	N		10/06/2023	10/07/2023	pregabalin 100 mg capsule	90	30	Tc, Reba	[id number]	Medicare	[facility]  A	N	N	O	09/28/2023	10/01/2023	oxycodone hcl (ir) 15 mg tab	30	5	Tc, Reba	[id number]	Medicare	[facility]  A	N	N	O	09/27/2023	09/27/2023	oxycodone hcl (ir) 15 mg tab	30	8	Tc, Reba	[id number]	Medicare	[facility]  A	N	N	O	09/10/2023	09/10/2023	oxycodone hcl (ir) 15 mg tab	30	5	Tc, Reba	[id number]	Medicare	[facility]  A	N	N		09/06/2023	09/06/2023	pregabalin 100 mg capsule	90	30	Tc, Reba	[id number]	Medicare	[facility]  A	N	N	O	08/27/2023	08/28/2023	oxycodone hcl (ir) 15 mg tab	30	5	Tc, Reba	[id number]	Medicare	[facility]  A	N	N	O	08/12/2023	08/13/2023	oxycodone hcl (ir) 15 mg tab	30	5	Tc, Reba	[id number]	Medicare	[facility]  A	N	N		08/04/2023	08/05/2023	pregabalin 100 mg capsule	90	30	Tc, Reba	[id number]	Medicare	[facility]  A	N	N		07/21/2023	07/22/2023	pregabalin 100 mg capsule	42	14	Tc, Reba	[id number]	Medicare	[facility]  A	N	N	O	07/16/2023	07/16/2023	oxycodone hcl (ir) 15 mg tab	30	7	Tc, Reba	[id number]	Medicare	[facility]  A	N	N	O	06/30/2023	07/01/2023	oxycodone hcl (ir) 15 mg tab	42	10	Tc, Reba	[id number]	Medicare	[facility]  A	N	N		06/19/2023	06/20/2023	pregabalin 100 mg capsule	90	30	Tc, Reba	[id number]	Medicare	[facility]  A	N	N	O	06/06/2023	06/07/2023	oxycodone hcl (ir) 15 mg tab	42	7	Tc, Reba	[id number]	Medicare	[facility]  B	N	N		02/08/2024	02/09/2024	pregabalin 100 mg capsule	42	7	Alba Jasso M	[id number]	Insurance	[facility]  B	N	N	O	02/08/2024	02/09/2024	hydromorphone 4 mg tablet	20	5	Alba Jasso M	[id number]	Insurance	[facility]  B	N	N		01/25/2024	01/26/2024	pregabalin 100 mg capsule	90	30	Vinny Maldonado MD	[id number]	Insurance	[facility]  B	N	N	O	01/25/2024	01/26/2024	oxycodone hcl (ir) 20 mg tab	60	30	Vinny Maldonado MD	[id number]	Insurance	[facility]  C	N	N	O	12/29/2023	12/29/2023	oxycodone hcl (ir) 15 mg tab	12	3	Christopher Montiel	[id number]	Insurance	[facility]	N	N		12/29/2023	12/29/2023	butalbital-acetaminophen-caffeine -40 mg tablet	53	8	Christopher Montiel	[id number]	[facility]	N	N	O	10/12/2023	10/14/2023	oxycodone hcl (ir) 15 mg tab	28	7	Naomi Ybarra	[id number]	[id number]  D	N	Y		04/03/2024	05/06/2024	pregabalin 100 mg capsule	90	30	Vinny Maldonado MD	[id number]	Insurance	[facility]  D	N	Y	O	04/30/2024	05/02/2024	oxycodone hcl (ir) 20 mg tab	60	30	Vinny Maldonado MD	[id number]	Insurance	[facility]  D	N	N	O	04/16/2024	04/17/2024	oxycodone hcl (ir) 5 mg tablet	10	3	Frank Ba	[id number]	Insurance	[facility]  D	N	N		04/03/2024	04/04/2024	pregabalin 100 mg capsule	90	30	Vinny Maldonado MD	[id number]	Insurance	[facility]  D	N	N	O	03/04/2024	03/05/2024	hydromorphone 4 mg tablet	20	5	Bonny Bryant	[id number]	Insurance	[facility]  E	N	N	O	03/26/2024	03/26/2024	oxycodone hcl (ir) 5 mg tablet	30	5	Elizabeth Quinones	[id number]	Insurance	[facility]  E	N	N	O	03/26/2024	03/26/2024	oxycodone hcl (ir) 10 mg tab	30	5	Stacie Elizabeth VILLANUEVA ANP-C	[id number]	Insurance	[facility]  E	N	N	O	03/26/2024	03/26/2024	oxycontin er 10 mg tablet	10	5	Stacie Elizabeth VILLANUEVA ANP-C	[id number]	Insurance	[facility]  F	N	N		09/28/2023	10/02/2023	pregabalin 100 mg capsule	30	10	Reba Montez	[id number]	[id number]  G	N	N	O	11/28/2023	11/30/2023	oxycodone hcl (ir) 20 mg tab	60	30	Vinny Maldonado MD	[id number]	Insurance	[id number]  G	N	N	O	11/16/2023	11/18/2023	oxycodone-acetaminophen  mg tab	30	15	Vinny Maldonado MD	[id number]	Insurance	[id number]  G	N	N		11/16/2023	11/18/2023	pregabalin 100 mg capsule	90	30	Vinny Maldonado MD	[id number]	Insurance	[id number]  G	N	N		09/28/2023	11/01/2023	pregabalin 100 mg capsule	30	10	Reba Montez	[id number]	[id number]

## 2024-05-29 NOTE — PHYSICAL EXAM
[No Acute Distress] : no acute distress [Normal Voice/Communication] : normal voice communication [Normal Sclera/Conjunctiva] : normal sclera/conjunctiva [PERRL] : pupils equal, round and reactive to light [Normal Outer Ear/Nose] : the ears and nose were normal in appearance [Normal Oropharynx] : the oropharynx was normal [No JVD] : no jugular venous distention [Supple] : the neck was supple [No Respiratory Distress] : no respiratory distress [Clear to Auscultation] : lungs were clear to auscultation bilaterally [No Accessory Muscle Use] : no accessory muscle use [Normal Rate] : heart rate was normal  [Regular Rhythm] : with a regular rhythm [Normal Bowel Sounds] : normal bowel sounds [Non Tender] : non-tender [Soft] : abdomen soft [No CVA Tenderness] : no ~M costovertebral angle tenderness [No Spinal Tenderness] : no spinal tenderness [Normal Gait] : normal gait [No Rash] : no rash [No Skin Lesions] : no skin lesions [Oriented x3] : oriented to person, place, and time [de-identified] :  edema right leg pt encourage to elevate legs when sitting. [de-identified] : Patient with right great toe swollen, numbness, and .5x.5 open wound, Eschar noted. clean with saline and open to air.  Right foot with + 2 edema

## 2024-05-29 NOTE — CONSULT NOTE ADULT - ASSESSMENT
54-y/o F with PMHx of DM, HTN, intercostal neuralgia, complex regional pain syndrome with spinal cord stimulator sp R intraoral cheek mass excision on 4/15 by Dr. Ba. presents to the ED for right sided back pain. She was last seen in Dr Ba office in early may for drainage of small collection of R facial collection. Pt had recent admission at [facility] for similar pain flare. she was found to have a right facial inflammation that she was on IV abx for in the hospital. She states that no one sent her abx to the pharmacy after discharge so she has not been taking anything and feels it has slightly gotten worse. denies fever, chills, nvd, complaint is pain exacberated by facial movement    Plan  - cw abx  - will discuss possible bedside draiange with attending  - glucose control  - rest per primary    d/w attending Dr. Jesenia Jenkins MD  Plastic and Reconstructive Surgery, PGY3  Available on [employer] Teams  [facility]: [id number], [place]: [phone] [facility]: Green Team [id number]

## 2024-05-29 NOTE — H&P ADULT - HISTORY OF PRESENT ILLNESS
55 y/o F with pmh of DM type 2, HTN, intercostal neuralgia, complex regional pain syndrome with spinal cord stimulator p/w pain in R flank, face, and R toe wound.  Pt reports over the past few days she has been having a flare of her chronic R sided pain associated with CRPS.  She did not have relief from home oxycodone.  Pain is similar to prior CRPS symptoms with no new weakness or numbness.  Pt also reports R facial pain and swelling.  Pt had lipoma removed 4/24 and post procedure was complicated by hematoma and lower facial weakness.  Pt was admitted to [facility] 2 weeks ago, and was treated with Dilaudid for pain, and received Unasyn for possible infection of R face. She was discharged home with change from Zanaflex to methocarbamol, and rx for Augmentin, however was unable to fill either (methocarbamol required prior auth). Pt also reports R 1st toe wound and is unsure how she injured it.  She reports she has neuropathy with chronic numbness.  No drainage from wound, but states it appears swollen.  Pt reports low grade fever and chills.  Pt had visit from house calls RN today who noted pt to be in worsening pain, and called EMS to bring her to the ED.           In the ED, pt had CT maxillofacial with findings suspicious for phlegmon.  She was given clindamycin.  She received Toradol, morphine, lidocaine patch, and Dilaudid for pain.

## 2024-05-29 NOTE — CONSULT NOTE ADULT - SUBJECTIVE AND OBJECTIVE BOX
Plastic Surgery Consult Note  (pg [facility]: [id number], [facility]: [phone])    HPI:  54-y/o F with PMHx of DM, HTN, intercostal neuralgia, complex regional pain syndrome with spinal cord stimulator sp R intraoral cheek mass excision on 4/15 by Dr. Ba. presents to the ED for right sided back pain. Pt had recent admission at [facility] for similar pain flare. she was found to have a right facial inflammation that she was on IV abx for in the hospital. She states that no one sent her abx to the pharmacy after discharge so she has not been taking anything for the infection and the right facial swelling has gotten worse. She was last seen in Dr Ba office in early may for small drainage of collections. Since OSH dc on 5/18, she reports     PAST MEDICAL & SURGICAL HISTORY:  HTN (hypertension)      Neuropathy      Obesity      Former cigarette smoker  (smoked x 45 years; quit ~2013)      Marijuana smoker, continuous  (states smokes 3 - 4 times per day for pain management reasons)      Neuralgia  (pt states hx/o "intercostal neuralgia")      Diabetes      Essential hypertension      IBS (irritable bowel syndrome)      Complex regional pain syndrome type 1      DM2 (diabetes mellitus, type 2)      Intercostal neuralgia      Localized swelling, mass and lump, head      Asthma      History of CHF (congestive heart failure)      Fibroids      Idiopathic intracranial hypertension      History of cholecystectomy      History of hand surgery  (pt states she had 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12)      Spinal cord neurostimulator device in situ      History of removal of cyst        Allergies    duloxetine (Other)  amitriptyline (Other)    Intolerances      Home Medications:  atorvastatin 20 mg oral tablet: 1 tab(s) orally once a day (at bedtime) (08 May 2024 09:58)  bumetanide 2 mg oral tablet: 1 tab(s) orally once a day (08 May 2024 09:58)  carvedilol 3.125 mg oral tablet: 1 tab(s) orally every 12 hours (08 May 2024 09:58)  metFORMIN 1000 mg oral tablet: 1 tab(s) orally 2 times a day (08 May 2024 09:58)  montelukast 10 mg oral tablet: 1 tab(s) orally once a day (08 May 2024 09:58)  Movantik 25 mg oral tablet: 1 tab(s) orally once a day (08 May 2024 09:58)  NovoLOG FlexPen 100 units/mL injectable solution: 12 unit(s) injectable 3 times a day (before meals) (08 May 2024 09:56)  pantoprazole 40 mg oral delayed release tablet: 1 tab(s) orally once a day (before a meal) (08 May 2024 09:58)  pregabalin 100 mg oral capsule: 1 cap(s) orally 3 times a day (08 May 2024 09:58)  Tresiba 100 units/mL subcutaneous solution: 54 international unit(s) subcutaneous once a day (at bedtime) (08 May 2024 09:57)  Zanaflex 4 mg oral tablet: 1 tab(s) orally 3 times a day as needed for  muscle spasm (08 May 2024 10:01)    MEDICATIONS  (STANDING):  chlorhexidine 2% Cloths 1 Application(s) Topical daily  enoxaparin Injectable 40 milliGRAM(s) SubCutaneous every 12 hours  oxyCODONE    IR 15 milliGRAM(s) Oral once      SOCIAL HISTORY:  FAMILY HISTORY:  FH: HTN (hypertension)        ___________________________________________  OBJECTIVE:  Vital Signs Last 24 Hrs  T(C): 36.7 (29 May 2024 04:30), Max: 36.9 (28 May 2024 22:26)  T(F): 98 (29 May 2024 04:30), Max: 98.4 (28 May 2024 22:26)  HR: 58 (29 May 2024 04:30) (58 - 68)  BP: 131/73 (29 May 2024 04:30) (131/73 - 149/90)  BP(mean): --  RR: 17 (29 May 2024 04:30) (16 - 20)  SpO2: 97% (29 May 2024 04:30) (97% - 97%)    Parameters below as of 29 May 2024 04:30  Patient On (Oxygen Delivery Method): room air    CAPILLARY BLOOD GLUCOSE      POCT Blood Glucose.: 137 mg/dL (29 May 2024 04:36)    I&O's Detail    General: Well developed, well nourished, NAD  Neuro: Alert and oriented, no focal deficits, moves all extremities spontaneously  HEENT: NCAT, EOMI, anicteric, mucosa moist  Respiratory: Airway patent, respirations unlabored  CVS: Regular rate and rhythm  Abdomen: Soft, nontender, nondistended  Extremities: No edema, sensation and movement grossly intact  Skin: Warm, dry, appropriate color  ____________________________________________  LABS:  CBC Full  -  ( 28 May 2024 20:39 )  WBC Count : 12.09 K/uL  RBC Count : 4.65 M/uL  Hemoglobin : 13.5 g/dL  Hematocrit : 40.9 %  Platelet Count - Automated : 531 K/uL  Mean Cell Volume : 88.0 fL  Mean Cell Hemoglobin : 29.0 pg  Mean Cell Hemoglobin Concentration : 33.0 gm/dL  Auto Neutrophil # : 7.20 K/uL  Auto Lymphocyte # : 4.04 K/uL  Auto Monocyte # : 0.62 K/uL  Auto Eosinophil # : 0.16 K/uL  Auto Basophil # : 0.04 K/uL  Auto Neutrophil % : 59.7 %  Auto Lymphocyte % : 33.4 %  Auto Monocyte % : 5.1 %  Auto Eosinophil % : 1.3 %  Auto Basophil % : 0.3 %    05-29    136  |  99  |  18  ----------------------------<  187<H>  3.9   |  23  |  1.17    Ca    8.7      29 May 2024 01:00  Mg     1.10     05-28    TPro  7.1  /  Alb  3.5  /  TBili  0.4  /  DBili  x   /  AST  13  /  ALT  17  /  AlkPhos  127<H>  05-29    LIVER FUNCTIONS - ( 29 May 2024 01:00 )  Alb: 3.5 g/dL / Pro: 7.1 g/dL / ALK PHOS: 127 U/L / ALT: 17 U/L / AST: 13 U/L / GGT: x             Urinalysis Basic - ( 29 May 2024 01:00 )    Color: x / Appearance: x / SG: x / pH: x  Gluc: 187 mg/dL / Ketone: x  / Bili: x / Urobili: x   Blood: x / Protein: x / Nitrite: x   Leuk Esterase: x / RBC: x / WBC x   Sq Epi: x / Non Sq Epi: x / Bacteria: x            ____________________________________________  MICRO:  RECENT CULTURES:    ____________________________________________  RADIOLOGY:   Plastic Surgery Consult Note  (pg [facility]: [id number], [facility]: [phone])    HPI:  54-y/o F with PMHx of DM, HTN, intercostal neuralgia, complex regional pain syndrome with spinal cord stimulator sp R intraoral cheek mass excision on 4/15 by Dr. Ba. presents to the ED for right sided back pain. She was last seen in Dr Ba office in early may for drainage of small collection of R facial collection. Pt had recent admission at [facility] for similar pain flare. she was found to have a right facial inflammation that she was on IV abx for in the hospital. She states that no one sent her abx to the pharmacy after discharge so she has not been taking anything and feels it has slightly gotten worse. denies fever, chills, nvd, complaint is pain exacberated by facial movement    PAST MEDICAL & SURGICAL HISTORY:  HTN (hypertension)      Neuropathy      Obesity      Former cigarette smoker  (smoked x 45 years; quit ~2013)      Marijuana smoker, continuous  (states smokes 3 - 4 times per day for pain management reasons)      Neuralgia  (pt states hx/o "intercostal neuralgia")      Diabetes      Essential hypertension      IBS (irritable bowel syndrome)      Complex regional pain syndrome type 1      DM2 (diabetes mellitus, type 2)      Intercostal neuralgia      Localized swelling, mass and lump, head      Asthma      History of CHF (congestive heart failure)      Fibroids      Idiopathic intracranial hypertension      History of cholecystectomy      History of hand surgery  (pt states she had 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12)      Spinal cord neurostimulator device in situ      History of removal of cyst        Allergies    duloxetine (Other)  amitriptyline (Other)    Intolerances      Home Medications:  atorvastatin 20 mg oral tablet: 1 tab(s) orally once a day (at bedtime) (08 May 2024 09:58)  bumetanide 2 mg oral tablet: 1 tab(s) orally once a day (08 May 2024 09:58)  carvedilol 3.125 mg oral tablet: 1 tab(s) orally every 12 hours (08 May 2024 09:58)  metFORMIN 1000 mg oral tablet: 1 tab(s) orally 2 times a day (08 May 2024 09:58)  montelukast 10 mg oral tablet: 1 tab(s) orally once a day (08 May 2024 09:58)  Movantik 25 mg oral tablet: 1 tab(s) orally once a day (08 May 2024 09:58)  NovoLOG FlexPen 100 units/mL injectable solution: 12 unit(s) injectable 3 times a day (before meals) (08 May 2024 09:56)  pantoprazole 40 mg oral delayed release tablet: 1 tab(s) orally once a day (before a meal) (08 May 2024 09:58)  pregabalin 100 mg oral capsule: 1 cap(s) orally 3 times a day (08 May 2024 09:58)  Tresiba 100 units/mL subcutaneous solution: 54 international unit(s) subcutaneous once a day (at bedtime) (08 May 2024 09:57)  Zanaflex 4 mg oral tablet: 1 tab(s) orally 3 times a day as needed for  muscle spasm (08 May 2024 10:01)    MEDICATIONS  (STANDING):  chlorhexidine 2% Cloths 1 Application(s) Topical daily  enoxaparin Injectable 40 milliGRAM(s) SubCutaneous every 12 hours  oxyCODONE    IR 15 milliGRAM(s) Oral once      SOCIAL HISTORY:  FAMILY HISTORY:  FH: HTN (hypertension)        ___________________________________________  OBJECTIVE:  Vital Signs Last 24 Hrs  T(C): 36.7 (29 May 2024 04:30), Max: 36.9 (28 May 2024 22:26)  T(F): 98 (29 May 2024 04:30), Max: 98.4 (28 May 2024 22:26)  HR: 58 (29 May 2024 04:30) (58 - 68)  BP: 131/73 (29 May 2024 04:30) (131/73 - 149/90)  BP(mean): --  RR: 17 (29 May 2024 04:30) (16 - 20)  SpO2: 97% (29 May 2024 04:30) (97% - 97%)    Parameters below as of 29 May 2024 04:30  Patient On (Oxygen Delivery Method): room air    CAPILLARY BLOOD GLUCOSE      POCT Blood Glucose.: 137 mg/dL (29 May 2024 04:36)    I&O's Detail    General: Well developed, well nourished, NAD  Neuro: Alert and oriented, no focal deficits, moves all extremities spontaneously  HEENT: R side malar/cheek erythema  slightly fluctuant, TTP  introal incision intact, no discharge  sensation intact BL  Facial nerve R marginal mandibular nerve minimal function  ____________________________________________  LABS:  CBC Full  -  ( 28 May 2024 20:39 )  WBC Count : 12.09 K/uL  RBC Count : 4.65 M/uL  Hemoglobin : 13.5 g/dL  Hematocrit : 40.9 %  Platelet Count - Automated : 531 K/uL  Mean Cell Volume : 88.0 fL  Mean Cell Hemoglobin : 29.0 pg  Mean Cell Hemoglobin Concentration : 33.0 gm/dL  Auto Neutrophil # : 7.20 K/uL  Auto Lymphocyte # : 4.04 K/uL  Auto Monocyte # : 0.62 K/uL  Auto Eosinophil # : 0.16 K/uL  Auto Basophil # : 0.04 K/uL  Auto Neutrophil % : 59.7 %  Auto Lymphocyte % : 33.4 %  Auto Monocyte % : 5.1 %  Auto Eosinophil % : 1.3 %  Auto Basophil % : 0.3 %    05-29    136  |  99  |  18  ----------------------------<  187<H>  3.9   |  23  |  1.17    Ca    8.7      29 May 2024 01:00  Mg     1.10     05-28    TPro  7.1  /  Alb  3.5  /  TBili  0.4  /  DBili  x   /  AST  13  /  ALT  17  /  AlkPhos  127<H>  05-29    LIVER FUNCTIONS - ( 29 May 2024 01:00 )  Alb: 3.5 g/dL / Pro: 7.1 g/dL / ALK PHOS: 127 U/L / ALT: 17 U/L / AST: 13 U/L / GGT: x             Urinalysis Basic - ( 29 May 2024 01:00 )    Color: x / Appearance: x / SG: x / pH: x  Gluc: 187 mg/dL / Ketone: x  / Bili: x / Urobili: x   Blood: x / Protein: x / Nitrite: x   Leuk Esterase: x / RBC: x / WBC x   Sq Epi: x / Non Sq Epi: x / Bacteria: x            ____________________________________________  MICRO:  RECENT CULTURES:    ____________________________________________  RADIOLOGY:

## 2024-05-29 NOTE — DISCHARGE NOTE PROVIDER - HOSPITAL COURSE
55 y/o F with pmh of DM type 2, HTN, intercostal neuralgia, complex regional pain syndrome with spinal cord stimulator p/w pain in R flank, face, and R toe wound. For complex regional pain syndrome, patient was give oral oxycodone, and pain management was consulted. For facial phlegmonous cellulitis, patient was started on IV Unasyn, seen by plastic surgery 53 y/o F with pmh of DM type 2, HTN, intercostal neuralgia, complex regional pain syndrome with spinal cord stimulator p/w pain in R flank, face, and R toe wound. For complex regional pain syndrome, patient was give oral oxycodone, IV toradol, tylenol and pain management was consulted. Patient provided a list of outpatient pain management providers. For facial phlegmonous cellulitis, patient was started on IV Unasyn, seen by plastic surgery, attempted drainage but no pus. Patient was transition to oral antibiotics on discharge to complete 2 weeks. 53 y/o F with pmh of DM type 2, HTN, intercostal neuralgia, complex regional pain syndrome with spinal cord stimulator p/w pain in R flank, face, and R toe wound. For complex regional pain syndrome, patient was give oral oxycodone, IV toradol, tylenol and pain management was consulted. Patient provided a list of outpatient pain management providers. For facial phlegmonous cellulitis, patient was started on IV Unasyn, seen by plastic surgery, attempted drainage but no pus. Patient was transition to oral antibiotics on discharge to complete 2 weeks.     On 6/3/2024 patient medically cleared for discharge home by Dr. Perez    Patient would benefit from increased home health aide hours 53 y/o F with pmh of DM type 2, HTN, intercostal neuralgia, complex regional pain syndrome with spinal cord stimulator p/w pain in R flank, face, and R toe wound. For complex regional pain syndrome, patient was give oral oxycodone, IV toradol, tylenol and pain management was consulted. Patient provided a list of outpatient pain management providers. For facial phlegmonous cellulitis, patient was started on IV Unasyn, seen by plastic surgery, attempted drainage but no pus. Patient was transition to oral antibiotics on discharge to complete 2 weeks. Patient may also benefit from additional services at home due to multiple medications and comorbidities.    On 6/3/2024 patient medically cleared for discharge home by Dr. Perez    Patient would benefit from increased home health aide hours.

## 2024-05-29 NOTE — PATIENT PROFILE ADULT - FUNCTIONAL ASSESSMENT - BASIC MOBILITY 6.
2-calculated by average/Not able to assess (calculate score using [facility] averaging method)

## 2024-05-29 NOTE — HISTORY OF PRESENT ILLNESS
[Patient] : patient [FreeTextEntry1] : N/A, [facility] [FreeTextEntry2] : COVID SCREEN: Patient or caretaker denies fever, cough, trouble breathing, rash, vomiting. Patient has not been in close contact with anyone who is COVID-19 positive, or suspected of having COVID-19.  N95 mask, gloves, eye wear and gown (if indicated) used during visit: Yes.  HPI: 52yo F with PMHX of bipolar II disorder, depression, class III obesity (41 kg/m2), IBS-D, T2DM, GERD, asthma. high triglyceride, HTN, low back pain. Seen today for admission to house calls program.   53-year-old female being seen for an initial visit, met with patient during initial visit, Patient reports that she currently has 30 hours of CDPAP (daughter is her aide) via [employer]. Pt receives PT 2x per week until April. Patient independently provided all information. Patient was on the program in the past but due to admission at [facility] for LTC patient was discharged in 2023. Pt was in the NH due to being in coma for about a week and required JAI. Patient was admitted to [facility] in February and discharged home. Patient reports that she has one daughter and a close friend that is involved. 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  Interval Events: -Patient is being seen for post hospitalization for chronic pain  -Post Hospital Discharge, Patient complaining of severe back pain and right flank pain to rib cage 10/10, Did not take any pain killer as it does not work, she needs IV for the flare up. Patient will call HC once ready for ambulance. Declined CP, want to go to hospital for IV pain management Patient with right great toe swollen, numbness, and .5x.5 open wound, Eschar noted. clean with saline and open to air.  Right foot with + 2 edema.  Patient encourage to take pain killer but declined. right side of face swollen and uncomfortable -Outpatient PT - Patients have an appointment with Pain management for chronic pain. Dr Lizabeth Etienne. -Patient have urology f/u with Dr Pricila GUERRA  Patient stated he saw kidney doctor who stated pt have CKD stage 3. Patient did not elaborate any more.   Medical Issues:  Asthma- on Nebulizer, Inhaler  HTN- On Lisinopril high Triglyceride- ON Atorvastatin Bipolar disorder- Not on meds at this time.  DM- On Insulin Chronic Pain, Lyrica, Oxycontin and oxycodone. Subjective: 1. Appetite/Weight: Class III obesity with BMI ~42, appetite fair, weight loss 20 lbs, 330ls 3/4 2. Gait/Falls: Steady gait, Cane dependent. No falls 3. Sleep: fair 4. BMs: No concerns, take, meds 5. Urine: No concerns since lara discontinue 6. Skin: No rash or ulcers. 7. Mood/Memory: Notes recent small memory lapses, mood fair not depressed, [employer] support system. 8. Hospitalization: comma 22 to 2022 to Nursing Home d/c 2024  to  [facility] DME: Cane, Oxygen concentrator, Nebulizer, Glucometer w/c, rollator, shower chair, raised toilet sit.  Social: Lives with daughter Caregiver: Daughter is CDPAP AIDE 30 hours / week MOLST: Full Code HCP-  -PCP Anirudh Maldonado, all blood work done by her. and pain meds prescribed and manage by him   ADVANCE CARE PLANNIN. Total Time Spent: 20 min 2. Participants: Myself, patient, SW 3. Discussion: Goals of Care, Advanced Directives, Health Care Agency, and Disease trajectory 4. Disease Trajectory: Discussed and reviewed that patient's health is currently stable, and the prognosis moving forward is unclear. 5. Prognosis, Based On Clinician Best Judgment: Indeterminate 6. Goals of Care: 1) Extend life, by hospitalization and aggressive measures if needed, 2) Avoid discomfort, 3) Manage medical problems at home where safely possible. 7. HCA: Friend Tony primary, cousin Shashank secondary. 8. MOLST Completed: CPR yes, intubation long term, do hospitalize, no limitation on medical interventions, trial feeding tube, trial IVF, use antibiotics, Dialysis. 9. Hospice Benefit discussion deferred at this time.

## 2024-05-30 LAB
ANION GAP SERPL CALC-SCNC: 15 MMOL/L — HIGH (ref 7–14)
BASOPHILS # BLD AUTO: 0.03 K/UL — SIGNIFICANT CHANGE UP (ref 0–0.2)
BASOPHILS NFR BLD AUTO: 0.4 % — SIGNIFICANT CHANGE UP (ref 0–2)
BUN SERPL-MCNC: 16 MG/DL — SIGNIFICANT CHANGE UP (ref 7–23)
CALCIUM SERPL-MCNC: 8.6 MG/DL — SIGNIFICANT CHANGE UP (ref 8.4–10.5)
CHLORIDE SERPL-SCNC: 101 MMOL/L — SIGNIFICANT CHANGE UP (ref 98–107)
CO2 SERPL-SCNC: 19 MMOL/L — LOW (ref 22–31)
CREAT SERPL-MCNC: 0.75 MG/DL — SIGNIFICANT CHANGE UP (ref 0.5–1.3)
EGFR: 95 ML/MIN/1.73M2 — SIGNIFICANT CHANGE UP
EOSINOPHIL # BLD AUTO: 0.14 K/UL — SIGNIFICANT CHANGE UP (ref 0–0.5)
EOSINOPHIL NFR BLD AUTO: 1.9 % — SIGNIFICANT CHANGE UP (ref 0–6)
GLUCOSE BLDC GLUCOMTR-MCNC: 207 MG/DL — HIGH (ref 70–99)
GLUCOSE BLDC GLUCOMTR-MCNC: 250 MG/DL — HIGH (ref 70–99)
GLUCOSE BLDC GLUCOMTR-MCNC: 257 MG/DL — HIGH (ref 70–99)
GLUCOSE BLDC GLUCOMTR-MCNC: 318 MG/DL — HIGH (ref 70–99)
GLUCOSE SERPL-MCNC: 371 MG/DL — HIGH (ref 70–99)
HCT VFR BLD CALC: 35.1 % — SIGNIFICANT CHANGE UP (ref 34.5–45)
HGB BLD-MCNC: 11.2 G/DL — LOW (ref 11.5–15.5)
IANC: 3.91 K/UL — SIGNIFICANT CHANGE UP (ref 1.8–7.4)
IMM GRANULOCYTES NFR BLD AUTO: 0.3 % — SIGNIFICANT CHANGE UP (ref 0–0.9)
LYMPHOCYTES # BLD AUTO: 2.98 K/UL — SIGNIFICANT CHANGE UP (ref 1–3.3)
LYMPHOCYTES # BLD AUTO: 39.5 % — SIGNIFICANT CHANGE UP (ref 13–44)
MAGNESIUM SERPL-MCNC: 1.6 MG/DL — SIGNIFICANT CHANGE UP (ref 1.6–2.6)
MCHC RBC-ENTMCNC: 28.8 PG — SIGNIFICANT CHANGE UP (ref 27–34)
MCHC RBC-ENTMCNC: 31.9 GM/DL — LOW (ref 32–36)
MCV RBC AUTO: 90.2 FL — SIGNIFICANT CHANGE UP (ref 80–100)
MONOCYTES # BLD AUTO: 0.47 K/UL — SIGNIFICANT CHANGE UP (ref 0–0.9)
MONOCYTES NFR BLD AUTO: 6.2 % — SIGNIFICANT CHANGE UP (ref 2–14)
NEUTROPHILS # BLD AUTO: 3.91 K/UL — SIGNIFICANT CHANGE UP (ref 1.8–7.4)
NEUTROPHILS NFR BLD AUTO: 51.7 % — SIGNIFICANT CHANGE UP (ref 43–77)
NRBC # BLD: 0 /100 WBCS — SIGNIFICANT CHANGE UP (ref 0–0)
NRBC # FLD: 0 K/UL — SIGNIFICANT CHANGE UP (ref 0–0)
PHOSPHATE SERPL-MCNC: 3.8 MG/DL — SIGNIFICANT CHANGE UP (ref 2.5–4.5)
PLATELET # BLD AUTO: 443 K/UL — HIGH (ref 150–400)
POTASSIUM SERPL-MCNC: 4.3 MMOL/L — SIGNIFICANT CHANGE UP (ref 3.5–5.3)
POTASSIUM SERPL-SCNC: 4.3 MMOL/L — SIGNIFICANT CHANGE UP (ref 3.5–5.3)
RBC # BLD: 3.89 M/UL — SIGNIFICANT CHANGE UP (ref 3.8–5.2)
RBC # FLD: 13.4 % — SIGNIFICANT CHANGE UP (ref 10.3–14.5)
SODIUM SERPL-SCNC: 135 MMOL/L — SIGNIFICANT CHANGE UP (ref 135–145)
WBC # BLD: 7.55 K/UL — SIGNIFICANT CHANGE UP (ref 3.8–10.5)
WBC # FLD AUTO: 7.55 K/UL — SIGNIFICANT CHANGE UP (ref 3.8–10.5)

## 2024-05-30 PROCEDURE — 99233 SBSQ HOSP IP/OBS HIGH 50: CPT

## 2024-05-30 RX ORDER — LIDOCAINE 4 G/100G
1 CREAM TOPICAL DAILY
Refills: 0 | Status: DISCONTINUED | OUTPATIENT
Start: 2024-05-30 | End: 2024-06-03

## 2024-05-30 RX ORDER — OXYCODONE HYDROCHLORIDE 5 MG/1
20 TABLET ORAL THREE TIMES A DAY
Refills: 0 | Status: DISCONTINUED | OUTPATIENT
Start: 2024-05-30 | End: 2024-06-03

## 2024-05-30 RX ADMIN — LISINOPRIL 5 MILLIGRAM(S): 2.5 TABLET ORAL at 05:56

## 2024-05-30 RX ADMIN — Medication 54 UNIT(S): at 22:14

## 2024-05-30 RX ADMIN — MONTELUKAST 10 MILLIGRAM(S): 4 TABLET, CHEWABLE ORAL at 12:16

## 2024-05-30 RX ADMIN — Medication 650 MILLIGRAM(S): at 07:13

## 2024-05-30 RX ADMIN — OXYCODONE HYDROCHLORIDE 20 MILLIGRAM(S): 5 TABLET ORAL at 18:24

## 2024-05-30 RX ADMIN — POLYETHYLENE GLYCOL 3350 17 GRAM(S): 17 POWDER, FOR SOLUTION ORAL at 12:21

## 2024-05-30 RX ADMIN — Medication 12 UNIT(S): at 12:50

## 2024-05-30 RX ADMIN — SENNA PLUS 2 TABLET(S): 8.6 TABLET ORAL at 21:07

## 2024-05-30 RX ADMIN — ENOXAPARIN SODIUM 60 MILLIGRAM(S): 100 INJECTION SUBCUTANEOUS at 05:52

## 2024-05-30 RX ADMIN — Medication 12 UNIT(S): at 18:19

## 2024-05-30 RX ADMIN — Medication 100 MILLIGRAM(S): at 05:54

## 2024-05-30 RX ADMIN — METHOCARBAMOL 750 MILLIGRAM(S): 500 TABLET, FILM COATED ORAL at 20:39

## 2024-05-30 RX ADMIN — Medication 2: at 18:19

## 2024-05-30 RX ADMIN — PANTOPRAZOLE SODIUM 40 MILLIGRAM(S): 20 TABLET, DELAYED RELEASE ORAL at 07:14

## 2024-05-30 RX ADMIN — Medication 6 MILLIGRAM(S): at 23:09

## 2024-05-30 RX ADMIN — ATORVASTATIN CALCIUM 20 MILLIGRAM(S): 80 TABLET, FILM COATED ORAL at 21:07

## 2024-05-30 RX ADMIN — Medication 1: at 22:14

## 2024-05-30 RX ADMIN — LIDOCAINE 1 PATCH: 4 CREAM TOPICAL at 13:41

## 2024-05-30 RX ADMIN — CHLORHEXIDINE GLUCONATE 1 APPLICATION(S): 213 SOLUTION TOPICAL at 12:15

## 2024-05-30 RX ADMIN — Medication 650 MILLIGRAM(S): at 01:25

## 2024-05-30 RX ADMIN — OXYCODONE HYDROCHLORIDE 20 MILLIGRAM(S): 5 TABLET ORAL at 10:00

## 2024-05-30 RX ADMIN — METHOCARBAMOL 750 MILLIGRAM(S): 500 TABLET, FILM COATED ORAL at 05:53

## 2024-05-30 RX ADMIN — CARVEDILOL PHOSPHATE 3.12 MILLIGRAM(S): 80 CAPSULE, EXTENDED RELEASE ORAL at 05:54

## 2024-05-30 RX ADMIN — METHOCARBAMOL 750 MILLIGRAM(S): 500 TABLET, FILM COATED ORAL at 13:38

## 2024-05-30 RX ADMIN — OXYCODONE HYDROCHLORIDE 20 MILLIGRAM(S): 5 TABLET ORAL at 09:15

## 2024-05-30 RX ADMIN — Medication 12 UNIT(S): at 09:10

## 2024-05-30 RX ADMIN — AMPICILLIN SODIUM AND SULBACTAM SODIUM 200 GRAM(S): 250; 125 INJECTION, POWDER, FOR SUSPENSION INTRAMUSCULAR; INTRAVENOUS at 05:54

## 2024-05-30 RX ADMIN — Medication 100 MILLIGRAM(S): at 21:07

## 2024-05-30 RX ADMIN — AMPICILLIN SODIUM AND SULBACTAM SODIUM 200 GRAM(S): 250; 125 INJECTION, POWDER, FOR SUSPENSION INTRAMUSCULAR; INTRAVENOUS at 18:20

## 2024-05-30 RX ADMIN — LIDOCAINE 1 PATCH: 4 CREAM TOPICAL at 18:36

## 2024-05-30 RX ADMIN — Medication 650 MILLIGRAM(S): at 05:53

## 2024-05-30 RX ADMIN — OXYCODONE HYDROCHLORIDE 20 MILLIGRAM(S): 5 TABLET ORAL at 19:15

## 2024-05-30 RX ADMIN — Medication 650 MILLIGRAM(S): at 18:20

## 2024-05-30 RX ADMIN — AMPICILLIN SODIUM AND SULBACTAM SODIUM 200 GRAM(S): 250; 125 INJECTION, POWDER, FOR SUSPENSION INTRAMUSCULAR; INTRAVENOUS at 12:15

## 2024-05-30 RX ADMIN — LIDOCAINE 1 PATCH: 4 CREAM TOPICAL at 13:38

## 2024-05-30 RX ADMIN — Medication 4: at 09:11

## 2024-05-30 RX ADMIN — ENOXAPARIN SODIUM 60 MILLIGRAM(S): 100 INJECTION SUBCUTANEOUS at 18:24

## 2024-05-30 RX ADMIN — BUMETANIDE 2 MILLIGRAM(S): 0.25 INJECTION INTRAMUSCULAR; INTRAVENOUS at 05:54

## 2024-05-30 RX ADMIN — Medication 650 MILLIGRAM(S): at 12:16

## 2024-05-30 RX ADMIN — Medication 100 MILLIGRAM(S): at 13:37

## 2024-05-30 RX ADMIN — CARVEDILOL PHOSPHATE 3.12 MILLIGRAM(S): 80 CAPSULE, EXTENDED RELEASE ORAL at 18:21

## 2024-05-30 RX ADMIN — Medication 2: at 12:51

## 2024-05-30 NOTE — PHYSICAL THERAPY INITIAL EVALUATION ADULT - MANUAL MUSCLE TESTING RESULTS, REHAB EVAL
PAtients bilateral upper and lower extremity strength grossly 4/5 upon MMT and functional assessment

## 2024-05-30 NOTE — PHYSICAL THERAPY INITIAL EVALUATION ADULT - GROSSLY INTACT, SENSORY
reports chronic numbness and tingling in feet due to neuropathy/Left UE/Right UE/Left LE/Right LE/Grossly Intact

## 2024-05-30 NOTE — PHYSICAL THERAPY INITIAL EVALUATION ADULT - PERTINENT HX OF CURRENT PROBLEM, REHAB EVAL
Patient is a 54 year old female with pmhx of DM type 2, HTN, intercostal neuralgia, complex regional pain syndrome with spinal cord stimulator presenting with pain in right flank pain, face issues. Chronic pain;  CT lumbar spine without acute findings

## 2024-05-30 NOTE — DIETITIAN INITIAL EVALUATION ADULT - OTHER INFO
Patient with c/o of pain upon visit-medical team aware. Otherwise, she reports good appetite and po intake. Completed >75% of breakfast. PO intake also documented in nursing flowsheets, % of meals. Denies any nausea/vomiting/diarrhea/constipation or difficulty chewing and swallowing. Last bowel movement on 5/28. RD provided the patient with verbal and written DM diet education; including, carb counting, DM myplate method, portion control, importance od having good adherence to consistent carbohydrate diet and having protein at each meal. Patient receptive to information provided.

## 2024-05-30 NOTE — DIETITIAN INITIAL EVALUATION ADULT - PERTINENT MEDS FT
MEDICATIONS  (STANDING):  acetaminophen     Tablet .. 650 milliGRAM(s) Oral every 6 hours  ampicillin/sulbactam  IVPB 3 Gram(s) IV Intermittent every 6 hours  atorvastatin 20 milliGRAM(s) Oral at bedtime  buMETAnide 2 milliGRAM(s) Oral daily  carvedilol 3.125 milliGRAM(s) Oral every 12 hours  chlorhexidine 2% Cloths 1 Application(s) Topical daily  dextrose 10% Bolus 125 milliLiter(s) IV Bolus once  dextrose 5%. 1000 milliLiter(s) (50 mL/Hr) IV Continuous <Continuous>  dextrose 5%. 1000 milliLiter(s) (100 mL/Hr) IV Continuous <Continuous>  dextrose 50% Injectable 12.5 Gram(s) IV Push once  dextrose 50% Injectable 25 Gram(s) IV Push once  enoxaparin Injectable 60 milliGRAM(s) SubCutaneous every 12 hours  glucagon  Injectable 1 milliGRAM(s) IntraMuscular once  insulin detemir injectable (LEVEMIR) 54 Unit(s) SubCutaneous at bedtime  insulin lispro (ADMELOG) corrective regimen sliding scale   SubCutaneous three times a day before meals  insulin lispro (ADMELOG) corrective regimen sliding scale   SubCutaneous at bedtime  insulin lispro Injectable (ADMELOG) 12 Unit(s) SubCutaneous three times a day before meals  lidocaine 1%/epinephrine 1:100,000 Inj 20 milliLiter(s) Local Injection once  lisinopril 5 milliGRAM(s) Oral daily  methocarbamol 750 milliGRAM(s) Oral every 8 hours  montelukast 10 milliGRAM(s) Oral daily  pantoprazole    Tablet 40 milliGRAM(s) Oral before breakfast  polyethylene glycol 3350 17 Gram(s) Oral daily  pregabalin 100 milliGRAM(s) Oral three times a day  senna 2 Tablet(s) Oral at bedtime    MEDICATIONS  (PRN):  albuterol    90 MICROgram(s) HFA Inhaler 2 Puff(s) Inhalation every 6 hours PRN Bronchospasm  dextrose Oral Gel 15 Gram(s) Oral once PRN Blood Glucose LESS THAN 70 milliGRAM(s)/deciliter  lidocaine   4% Patch 1 Patch Transdermal daily PRN back pain  melatonin 6 milliGRAM(s) Oral at bedtime PRN Insomnia  naloxegol 25 milliGRAM(s) Oral daily PRN constipation  oxyCODONE    IR 20 milliGRAM(s) Oral three times a day PRN for severe pain

## 2024-05-30 NOTE — DIETITIAN INITIAL EVALUATION ADULT - PROBLEM SELECTOR PLAN 2
Facial phlegmon.  Possible infection  - will treat with Unasyn  - will need to consult pt's plastic surgeon, Dr Ba, in am for further recs

## 2024-05-30 NOTE — DIETITIAN INITIAL EVALUATION ADULT - REASON FOR ADMISSION
Dorsalgia    Per chart review, 55 y/o F with medical history of DM type 2 (HbA1c 8.2% on 5/29/24), HTN, intercostal neuralgia, complex regional pain syndrome with spinal cord stimulator p/w pain in R flank, face, and R toe wound.

## 2024-05-30 NOTE — PHYSICAL THERAPY INITIAL EVALUATION ADULT - GENERAL OBSERVATIONS, REHAB EVAL
Patient found semi-reclined in bed, NAD, A&Ox4, +primafit Pt. in agreement to participate in skilled therapy session despite complaints of pain. spO2 100% on room air

## 2024-05-30 NOTE — PHYSICAL THERAPY INITIAL EVALUATION ADULT - ADDITIONAL COMMENTS
Patient lives alone in an apartment with elevator, no steps to negotiate. Patient owns a cane, rollator and wheelchair. Patient has CDPAP for 30 hours a week, daughter. Reports receiving PT prior to admission.    Patient left semi-reclined in bed, NAD, all lines and tubes intact, bed alarm on, call ridley within reach, TERESA Saleem aware of evaluation

## 2024-05-30 NOTE — DIETITIAN INITIAL EVALUATION ADULT - ADD RECOMMEND
1. Monitor weights, labs, BM's, skin integrity, p.o. intake.   2. Please document % PO intake in nursing flowsheet.   3. Suggest outpatient follow up with an endocrinologist to ensure long-term DM diet comprehension and compliance.  4.  Suggest outpatient follow up with appropriate RD for the purposes of long-term nutrition evaluation and diet education.

## 2024-05-30 NOTE — DIETITIAN INITIAL EVALUATION ADULT - PERTINENT LABORATORY DATA
05-30    135  |  101  |  16  ----------------------------<  371<H>  4.3   |  19<L>  |  0.75    Ca    8.6      30 May 2024 06:30  Phos  3.8     05-30  Mg     1.60     05-30    TPro  7.1  /  Alb  3.5  /  TBili  0.4  /  DBili  x   /  AST  13  /  ALT  17  /  AlkPhos  127<H>  05-29  POCT Blood Glucose.: 318 mg/dL (05-30-24 @ 08:58)  A1C with Estimated Average Glucose Result: 8.2 % (05-29-24 @ 06:27)  A1C with Estimated Average Glucose Result: 7.7 % (05-09-24 @ 05:27)  A1C with Estimated Average Glucose Result: 7.3 % (02-19-24 @ 05:45)

## 2024-05-30 NOTE — DIETITIAN INITIAL EVALUATION ADULT - ORAL INTAKE PTA/DIET HISTORY
Patient reports good appetite and po intake. She has a HHA for 30hrs/week. Patient reports having DM since age of 17 and have been adhering to DM diet. Her HHA assists her with planning meals, grocery and cooking/preparation during the week. Patient able to report various food groups, carbohydrate sources of foods, admits to consuming concentrated sweets at times as "treats." Also notes that she learned to bake cake at home with sugar substitute. Avoids addition of sugar and salt. NKFA. Reports usual weight to be 350lbs and she current weight 158.8kg/350lbs documented this admission.
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
## 2024-05-31 LAB
ALBUMIN SERPL ELPH-MCNC: 3.4 G/DL — SIGNIFICANT CHANGE UP (ref 3.3–5)
ALP SERPL-CCNC: 121 U/L — HIGH (ref 40–120)
ALT FLD-CCNC: 17 U/L — SIGNIFICANT CHANGE UP (ref 4–33)
ANION GAP SERPL CALC-SCNC: 15 MMOL/L — HIGH (ref 7–14)
AST SERPL-CCNC: 14 U/L — SIGNIFICANT CHANGE UP (ref 4–32)
BASOPHILS # BLD AUTO: 0.02 K/UL — SIGNIFICANT CHANGE UP (ref 0–0.2)
BASOPHILS NFR BLD AUTO: 0.2 % — SIGNIFICANT CHANGE UP (ref 0–2)
BILIRUB SERPL-MCNC: 0.4 MG/DL — SIGNIFICANT CHANGE UP (ref 0.2–1.2)
BUN SERPL-MCNC: 15 MG/DL — SIGNIFICANT CHANGE UP (ref 7–23)
CALCIUM SERPL-MCNC: 8.9 MG/DL — SIGNIFICANT CHANGE UP (ref 8.4–10.5)
CHLORIDE SERPL-SCNC: 100 MMOL/L — SIGNIFICANT CHANGE UP (ref 98–107)
CO2 SERPL-SCNC: 21 MMOL/L — LOW (ref 22–31)
CREAT SERPL-MCNC: 0.8 MG/DL — SIGNIFICANT CHANGE UP (ref 0.5–1.3)
EGFR: 88 ML/MIN/1.73M2 — SIGNIFICANT CHANGE UP
EOSINOPHIL # BLD AUTO: 0.2 K/UL — SIGNIFICANT CHANGE UP (ref 0–0.5)
EOSINOPHIL NFR BLD AUTO: 2.4 % — SIGNIFICANT CHANGE UP (ref 0–6)
GLUCOSE BLDC GLUCOMTR-MCNC: 173 MG/DL — HIGH (ref 70–99)
GLUCOSE BLDC GLUCOMTR-MCNC: 176 MG/DL — HIGH (ref 70–99)
GLUCOSE BLDC GLUCOMTR-MCNC: 250 MG/DL — HIGH (ref 70–99)
GLUCOSE BLDC GLUCOMTR-MCNC: 263 MG/DL — HIGH (ref 70–99)
GLUCOSE SERPL-MCNC: 226 MG/DL — HIGH (ref 70–99)
HCT VFR BLD CALC: 34.5 % — SIGNIFICANT CHANGE UP (ref 34.5–45)
HGB BLD-MCNC: 11.5 G/DL — SIGNIFICANT CHANGE UP (ref 11.5–15.5)
IANC: 3.75 K/UL — SIGNIFICANT CHANGE UP (ref 1.8–7.4)
IMM GRANULOCYTES NFR BLD AUTO: 0.2 % — SIGNIFICANT CHANGE UP (ref 0–0.9)
LYMPHOCYTES # BLD AUTO: 3.88 K/UL — HIGH (ref 1–3.3)
LYMPHOCYTES # BLD AUTO: 46.3 % — HIGH (ref 13–44)
MAGNESIUM SERPL-MCNC: 1.4 MG/DL — LOW (ref 1.6–2.6)
MCHC RBC-ENTMCNC: 29.3 PG — SIGNIFICANT CHANGE UP (ref 27–34)
MCHC RBC-ENTMCNC: 33.3 GM/DL — SIGNIFICANT CHANGE UP (ref 32–36)
MCV RBC AUTO: 88 FL — SIGNIFICANT CHANGE UP (ref 80–100)
MONOCYTES # BLD AUTO: 0.51 K/UL — SIGNIFICANT CHANGE UP (ref 0–0.9)
MONOCYTES NFR BLD AUTO: 6.1 % — SIGNIFICANT CHANGE UP (ref 2–14)
NEUTROPHILS # BLD AUTO: 3.75 K/UL — SIGNIFICANT CHANGE UP (ref 1.8–7.4)
NEUTROPHILS NFR BLD AUTO: 44.8 % — SIGNIFICANT CHANGE UP (ref 43–77)
NRBC # BLD: 0 /100 WBCS — SIGNIFICANT CHANGE UP (ref 0–0)
NRBC # FLD: 0 K/UL — SIGNIFICANT CHANGE UP (ref 0–0)
PHOSPHATE SERPL-MCNC: 4.3 MG/DL — SIGNIFICANT CHANGE UP (ref 2.5–4.5)
PLATELET # BLD AUTO: 438 K/UL — HIGH (ref 150–400)
POTASSIUM SERPL-MCNC: 3.9 MMOL/L — SIGNIFICANT CHANGE UP (ref 3.5–5.3)
POTASSIUM SERPL-SCNC: 3.9 MMOL/L — SIGNIFICANT CHANGE UP (ref 3.5–5.3)
PROT SERPL-MCNC: 7 G/DL — SIGNIFICANT CHANGE UP (ref 6–8.3)
RBC # BLD: 3.92 M/UL — SIGNIFICANT CHANGE UP (ref 3.8–5.2)
RBC # FLD: 13.4 % — SIGNIFICANT CHANGE UP (ref 10.3–14.5)
SODIUM SERPL-SCNC: 136 MMOL/L — SIGNIFICANT CHANGE UP (ref 135–145)
WBC # BLD: 8.38 K/UL — SIGNIFICANT CHANGE UP (ref 3.8–10.5)
WBC # FLD AUTO: 8.38 K/UL — SIGNIFICANT CHANGE UP (ref 3.8–10.5)

## 2024-05-31 PROCEDURE — 99233 SBSQ HOSP IP/OBS HIGH 50: CPT

## 2024-05-31 RX ORDER — INSULIN DETEMIR 100/ML (3)
56 INSULIN PEN (ML) SUBCUTANEOUS AT BEDTIME
Refills: 0 | Status: DISCONTINUED | OUTPATIENT
Start: 2024-05-31 | End: 2024-06-03

## 2024-05-31 RX ORDER — KETOROLAC TROMETHAMINE 30 MG/ML
15 SYRINGE (ML) INJECTION EVERY 6 HOURS
Refills: 0 | Status: DISCONTINUED | OUTPATIENT
Start: 2024-05-31 | End: 2024-06-02

## 2024-05-31 RX ORDER — ACETAMINOPHEN 500 MG
1000 TABLET ORAL ONCE
Refills: 0 | Status: COMPLETED | OUTPATIENT
Start: 2024-05-31 | End: 2024-05-31

## 2024-05-31 RX ORDER — MAGNESIUM SULFATE 500 MG/ML
2 VIAL (ML) INJECTION ONCE
Refills: 0 | Status: COMPLETED | OUTPATIENT
Start: 2024-05-31 | End: 2024-05-31

## 2024-05-31 RX ORDER — INSULIN LISPRO 100/ML
14 VIAL (ML) SUBCUTANEOUS
Refills: 0 | Status: DISCONTINUED | OUTPATIENT
Start: 2024-05-31 | End: 2024-06-03

## 2024-05-31 RX ADMIN — MONTELUKAST 10 MILLIGRAM(S): 4 TABLET, CHEWABLE ORAL at 13:03

## 2024-05-31 RX ADMIN — Medication 6 MILLIGRAM(S): at 23:23

## 2024-05-31 RX ADMIN — Medication 12 UNIT(S): at 09:26

## 2024-05-31 RX ADMIN — AMPICILLIN SODIUM AND SULBACTAM SODIUM 200 GRAM(S): 250; 125 INJECTION, POWDER, FOR SUSPENSION INTRAMUSCULAR; INTRAVENOUS at 23:23

## 2024-05-31 RX ADMIN — SENNA PLUS 2 TABLET(S): 8.6 TABLET ORAL at 22:43

## 2024-05-31 RX ADMIN — LIDOCAINE 1 PATCH: 4 CREAM TOPICAL at 01:00

## 2024-05-31 RX ADMIN — METHOCARBAMOL 750 MILLIGRAM(S): 500 TABLET, FILM COATED ORAL at 14:25

## 2024-05-31 RX ADMIN — PANTOPRAZOLE SODIUM 40 MILLIGRAM(S): 20 TABLET, DELAYED RELEASE ORAL at 06:27

## 2024-05-31 RX ADMIN — Medication 15 MILLIGRAM(S): at 19:25

## 2024-05-31 RX ADMIN — Medication 14 UNIT(S): at 19:03

## 2024-05-31 RX ADMIN — Medication 25 GRAM(S): at 13:03

## 2024-05-31 RX ADMIN — Medication 650 MILLIGRAM(S): at 00:00

## 2024-05-31 RX ADMIN — LISINOPRIL 5 MILLIGRAM(S): 2.5 TABLET ORAL at 06:27

## 2024-05-31 RX ADMIN — Medication 650 MILLIGRAM(S): at 23:24

## 2024-05-31 RX ADMIN — AMPICILLIN SODIUM AND SULBACTAM SODIUM 200 GRAM(S): 250; 125 INJECTION, POWDER, FOR SUSPENSION INTRAMUSCULAR; INTRAVENOUS at 13:03

## 2024-05-31 RX ADMIN — OXYCODONE HYDROCHLORIDE 20 MILLIGRAM(S): 5 TABLET ORAL at 20:08

## 2024-05-31 RX ADMIN — Medication 15 MILLIGRAM(S): at 23:23

## 2024-05-31 RX ADMIN — Medication 15 MILLIGRAM(S): at 18:55

## 2024-05-31 RX ADMIN — ENOXAPARIN SODIUM 60 MILLIGRAM(S): 100 INJECTION SUBCUTANEOUS at 06:27

## 2024-05-31 RX ADMIN — Medication 650 MILLIGRAM(S): at 00:55

## 2024-05-31 RX ADMIN — Medication 1: at 09:26

## 2024-05-31 RX ADMIN — OXYCODONE HYDROCHLORIDE 20 MILLIGRAM(S): 5 TABLET ORAL at 19:38

## 2024-05-31 RX ADMIN — METHOCARBAMOL 750 MILLIGRAM(S): 500 TABLET, FILM COATED ORAL at 07:40

## 2024-05-31 RX ADMIN — ATORVASTATIN CALCIUM 20 MILLIGRAM(S): 80 TABLET, FILM COATED ORAL at 22:43

## 2024-05-31 RX ADMIN — CHLORHEXIDINE GLUCONATE 1 APPLICATION(S): 213 SOLUTION TOPICAL at 13:12

## 2024-05-31 RX ADMIN — Medication 56 UNIT(S): at 22:44

## 2024-05-31 RX ADMIN — Medication 650 MILLIGRAM(S): at 06:26

## 2024-05-31 RX ADMIN — Medication 12 UNIT(S): at 13:05

## 2024-05-31 RX ADMIN — Medication 2: at 13:06

## 2024-05-31 RX ADMIN — POLYETHYLENE GLYCOL 3350 17 GRAM(S): 17 POWDER, FOR SOLUTION ORAL at 13:04

## 2024-05-31 RX ADMIN — Medication 100 MILLIGRAM(S): at 13:03

## 2024-05-31 RX ADMIN — OXYCODONE HYDROCHLORIDE 20 MILLIGRAM(S): 5 TABLET ORAL at 09:29

## 2024-05-31 RX ADMIN — AMPICILLIN SODIUM AND SULBACTAM SODIUM 200 GRAM(S): 250; 125 INJECTION, POWDER, FOR SUSPENSION INTRAMUSCULAR; INTRAVENOUS at 18:55

## 2024-05-31 RX ADMIN — CARVEDILOL PHOSPHATE 3.12 MILLIGRAM(S): 80 CAPSULE, EXTENDED RELEASE ORAL at 06:27

## 2024-05-31 RX ADMIN — AMPICILLIN SODIUM AND SULBACTAM SODIUM 200 GRAM(S): 250; 125 INJECTION, POWDER, FOR SUSPENSION INTRAMUSCULAR; INTRAVENOUS at 00:56

## 2024-05-31 RX ADMIN — Medication 100 MILLIGRAM(S): at 06:27

## 2024-05-31 RX ADMIN — AMPICILLIN SODIUM AND SULBACTAM SODIUM 200 GRAM(S): 250; 125 INJECTION, POWDER, FOR SUSPENSION INTRAMUSCULAR; INTRAVENOUS at 06:27

## 2024-05-31 RX ADMIN — OXYCODONE HYDROCHLORIDE 20 MILLIGRAM(S): 5 TABLET ORAL at 02:34

## 2024-05-31 RX ADMIN — Medication 100 MILLIGRAM(S): at 22:43

## 2024-05-31 RX ADMIN — METHOCARBAMOL 750 MILLIGRAM(S): 500 TABLET, FILM COATED ORAL at 22:44

## 2024-05-31 RX ADMIN — ENOXAPARIN SODIUM 60 MILLIGRAM(S): 100 INJECTION SUBCUTANEOUS at 18:57

## 2024-05-31 RX ADMIN — Medication 1000 MILLIGRAM(S): at 13:33

## 2024-05-31 RX ADMIN — OXYCODONE HYDROCHLORIDE 20 MILLIGRAM(S): 5 TABLET ORAL at 01:34

## 2024-05-31 RX ADMIN — BUMETANIDE 2 MILLIGRAM(S): 0.25 INJECTION INTRAMUSCULAR; INTRAVENOUS at 07:39

## 2024-05-31 RX ADMIN — Medication 3: at 19:03

## 2024-05-31 RX ADMIN — Medication 400 MILLIGRAM(S): at 13:03

## 2024-05-31 RX ADMIN — OXYCODONE HYDROCHLORIDE 20 MILLIGRAM(S): 5 TABLET ORAL at 10:00

## 2024-06-01 LAB
GLUCOSE BLDC GLUCOMTR-MCNC: 160 MG/DL — HIGH (ref 70–99)
GLUCOSE BLDC GLUCOMTR-MCNC: 194 MG/DL — HIGH (ref 70–99)
GLUCOSE BLDC GLUCOMTR-MCNC: 229 MG/DL — HIGH (ref 70–99)
GLUCOSE BLDC GLUCOMTR-MCNC: 277 MG/DL — HIGH (ref 70–99)

## 2024-06-01 PROCEDURE — 99232 SBSQ HOSP IP/OBS MODERATE 35: CPT

## 2024-06-01 RX ORDER — ACETAMINOPHEN 500 MG
1000 TABLET ORAL ONCE
Refills: 0 | Status: DISCONTINUED | OUTPATIENT
Start: 2024-06-01 | End: 2024-06-02

## 2024-06-01 RX ADMIN — METHOCARBAMOL 750 MILLIGRAM(S): 500 TABLET, FILM COATED ORAL at 06:17

## 2024-06-01 RX ADMIN — Medication 1 TABLET(S): at 17:57

## 2024-06-01 RX ADMIN — Medication 650 MILLIGRAM(S): at 06:17

## 2024-06-01 RX ADMIN — Medication 15 MILLIGRAM(S): at 17:58

## 2024-06-01 RX ADMIN — Medication 14 UNIT(S): at 09:14

## 2024-06-01 RX ADMIN — SENNA PLUS 2 TABLET(S): 8.6 TABLET ORAL at 22:34

## 2024-06-01 RX ADMIN — ATORVASTATIN CALCIUM 20 MILLIGRAM(S): 80 TABLET, FILM COATED ORAL at 21:49

## 2024-06-01 RX ADMIN — METHOCARBAMOL 750 MILLIGRAM(S): 500 TABLET, FILM COATED ORAL at 21:47

## 2024-06-01 RX ADMIN — OXYCODONE HYDROCHLORIDE 20 MILLIGRAM(S): 5 TABLET ORAL at 10:55

## 2024-06-01 RX ADMIN — Medication 100 MILLIGRAM(S): at 06:17

## 2024-06-01 RX ADMIN — OXYCODONE HYDROCHLORIDE 20 MILLIGRAM(S): 5 TABLET ORAL at 20:46

## 2024-06-01 RX ADMIN — Medication 15 MILLIGRAM(S): at 06:18

## 2024-06-01 RX ADMIN — Medication 2: at 18:01

## 2024-06-01 RX ADMIN — Medication 100 MILLIGRAM(S): at 14:11

## 2024-06-01 RX ADMIN — CHLORHEXIDINE GLUCONATE 1 APPLICATION(S): 213 SOLUTION TOPICAL at 13:01

## 2024-06-01 RX ADMIN — MONTELUKAST 10 MILLIGRAM(S): 4 TABLET, CHEWABLE ORAL at 12:56

## 2024-06-01 RX ADMIN — OXYCODONE HYDROCHLORIDE 20 MILLIGRAM(S): 5 TABLET ORAL at 21:30

## 2024-06-01 RX ADMIN — Medication 6 MILLIGRAM(S): at 23:57

## 2024-06-01 RX ADMIN — BUMETANIDE 2 MILLIGRAM(S): 0.25 INJECTION INTRAMUSCULAR; INTRAVENOUS at 06:17

## 2024-06-01 RX ADMIN — Medication 1: at 09:13

## 2024-06-01 RX ADMIN — Medication 15 MILLIGRAM(S): at 23:58

## 2024-06-01 RX ADMIN — METHOCARBAMOL 750 MILLIGRAM(S): 500 TABLET, FILM COATED ORAL at 14:12

## 2024-06-01 RX ADMIN — LISINOPRIL 5 MILLIGRAM(S): 2.5 TABLET ORAL at 06:17

## 2024-06-01 RX ADMIN — OXYCODONE HYDROCHLORIDE 20 MILLIGRAM(S): 5 TABLET ORAL at 09:55

## 2024-06-01 RX ADMIN — ENOXAPARIN SODIUM 60 MILLIGRAM(S): 100 INJECTION SUBCUTANEOUS at 06:18

## 2024-06-01 RX ADMIN — Medication 14 UNIT(S): at 18:02

## 2024-06-01 RX ADMIN — Medication 56 UNIT(S): at 22:34

## 2024-06-01 RX ADMIN — Medication 14 UNIT(S): at 12:57

## 2024-06-01 RX ADMIN — ENOXAPARIN SODIUM 60 MILLIGRAM(S): 100 INJECTION SUBCUTANEOUS at 17:59

## 2024-06-01 RX ADMIN — Medication 100 MILLIGRAM(S): at 22:34

## 2024-06-01 RX ADMIN — Medication 1: at 12:57

## 2024-06-01 RX ADMIN — Medication 15 MILLIGRAM(S): at 12:59

## 2024-06-01 RX ADMIN — AMPICILLIN SODIUM AND SULBACTAM SODIUM 200 GRAM(S): 250; 125 INJECTION, POWDER, FOR SUSPENSION INTRAMUSCULAR; INTRAVENOUS at 06:19

## 2024-06-01 RX ADMIN — Medication 1: at 22:35

## 2024-06-02 LAB
ANION GAP SERPL CALC-SCNC: 15 MMOL/L — HIGH (ref 7–14)
BUN SERPL-MCNC: 20 MG/DL — SIGNIFICANT CHANGE UP (ref 7–23)
CALCIUM SERPL-MCNC: 8.9 MG/DL — SIGNIFICANT CHANGE UP (ref 8.4–10.5)
CHLORIDE SERPL-SCNC: 99 MMOL/L — SIGNIFICANT CHANGE UP (ref 98–107)
CO2 SERPL-SCNC: 22 MMOL/L — SIGNIFICANT CHANGE UP (ref 22–31)
CREAT SERPL-MCNC: 0.93 MG/DL — SIGNIFICANT CHANGE UP (ref 0.5–1.3)
EGFR: 73 ML/MIN/1.73M2 — SIGNIFICANT CHANGE UP
GLUCOSE BLDC GLUCOMTR-MCNC: 172 MG/DL — HIGH (ref 70–99)
GLUCOSE BLDC GLUCOMTR-MCNC: 215 MG/DL — HIGH (ref 70–99)
GLUCOSE BLDC GLUCOMTR-MCNC: 218 MG/DL — HIGH (ref 70–99)
GLUCOSE BLDC GLUCOMTR-MCNC: 222 MG/DL — HIGH (ref 70–99)
GLUCOSE SERPL-MCNC: 205 MG/DL — HIGH (ref 70–99)
MAGNESIUM SERPL-MCNC: 1.5 MG/DL — LOW (ref 1.6–2.6)
PHOSPHATE SERPL-MCNC: 5.4 MG/DL — HIGH (ref 2.5–4.5)
POTASSIUM SERPL-MCNC: 3.9 MMOL/L — SIGNIFICANT CHANGE UP (ref 3.5–5.3)
POTASSIUM SERPL-SCNC: 3.9 MMOL/L — SIGNIFICANT CHANGE UP (ref 3.5–5.3)
SODIUM SERPL-SCNC: 136 MMOL/L — SIGNIFICANT CHANGE UP (ref 135–145)

## 2024-06-02 PROCEDURE — 99232 SBSQ HOSP IP/OBS MODERATE 35: CPT

## 2024-06-02 RX ORDER — ACETAMINOPHEN 500 MG
1000 TABLET ORAL ONCE
Refills: 0 | Status: COMPLETED | OUTPATIENT
Start: 2024-06-02 | End: 2024-06-02

## 2024-06-02 RX ORDER — KETOROLAC TROMETHAMINE 30 MG/ML
15 SYRINGE (ML) INJECTION EVERY 6 HOURS
Refills: 0 | Status: DISCONTINUED | OUTPATIENT
Start: 2024-06-02 | End: 2024-06-03

## 2024-06-02 RX ADMIN — Medication 100 MILLIGRAM(S): at 23:04

## 2024-06-02 RX ADMIN — MONTELUKAST 10 MILLIGRAM(S): 4 TABLET, CHEWABLE ORAL at 12:07

## 2024-06-02 RX ADMIN — ENOXAPARIN SODIUM 60 MILLIGRAM(S): 100 INJECTION SUBCUTANEOUS at 05:53

## 2024-06-02 RX ADMIN — Medication 2: at 12:51

## 2024-06-02 RX ADMIN — Medication 100 MILLIGRAM(S): at 05:59

## 2024-06-02 RX ADMIN — Medication 2: at 17:57

## 2024-06-02 RX ADMIN — Medication 100 MILLIGRAM(S): at 14:43

## 2024-06-02 RX ADMIN — BUMETANIDE 2 MILLIGRAM(S): 0.25 INJECTION INTRAMUSCULAR; INTRAVENOUS at 05:52

## 2024-06-02 RX ADMIN — LISINOPRIL 5 MILLIGRAM(S): 2.5 TABLET ORAL at 05:55

## 2024-06-02 RX ADMIN — METHOCARBAMOL 750 MILLIGRAM(S): 500 TABLET, FILM COATED ORAL at 23:04

## 2024-06-02 RX ADMIN — ATORVASTATIN CALCIUM 20 MILLIGRAM(S): 80 TABLET, FILM COATED ORAL at 23:05

## 2024-06-02 RX ADMIN — Medication 1: at 08:41

## 2024-06-02 RX ADMIN — PANTOPRAZOLE SODIUM 40 MILLIGRAM(S): 20 TABLET, DELAYED RELEASE ORAL at 05:54

## 2024-06-02 RX ADMIN — METHOCARBAMOL 750 MILLIGRAM(S): 500 TABLET, FILM COATED ORAL at 14:46

## 2024-06-02 RX ADMIN — CHLORHEXIDINE GLUCONATE 1 APPLICATION(S): 213 SOLUTION TOPICAL at 12:07

## 2024-06-02 RX ADMIN — OXYCODONE HYDROCHLORIDE 20 MILLIGRAM(S): 5 TABLET ORAL at 17:01

## 2024-06-02 RX ADMIN — Medication 1 TABLET(S): at 18:20

## 2024-06-02 RX ADMIN — Medication 56 UNIT(S): at 23:05

## 2024-06-02 RX ADMIN — Medication 1000 MILLIGRAM(S): at 11:09

## 2024-06-02 RX ADMIN — ENOXAPARIN SODIUM 60 MILLIGRAM(S): 100 INJECTION SUBCUTANEOUS at 18:21

## 2024-06-02 RX ADMIN — Medication 15 MILLIGRAM(S): at 12:08

## 2024-06-02 RX ADMIN — Medication 14 UNIT(S): at 08:42

## 2024-06-02 RX ADMIN — Medication 14 UNIT(S): at 17:57

## 2024-06-02 RX ADMIN — Medication 1 TABLET(S): at 05:52

## 2024-06-02 RX ADMIN — CARVEDILOL PHOSPHATE 3.12 MILLIGRAM(S): 80 CAPSULE, EXTENDED RELEASE ORAL at 05:55

## 2024-06-02 RX ADMIN — Medication 6 MILLIGRAM(S): at 23:13

## 2024-06-02 RX ADMIN — Medication 14 UNIT(S): at 12:52

## 2024-06-02 RX ADMIN — Medication 15 MILLIGRAM(S): at 05:53

## 2024-06-02 RX ADMIN — OXYCODONE HYDROCHLORIDE 20 MILLIGRAM(S): 5 TABLET ORAL at 21:27

## 2024-06-02 RX ADMIN — Medication 400 MILLIGRAM(S): at 10:09

## 2024-06-02 RX ADMIN — OXYCODONE HYDROCHLORIDE 20 MILLIGRAM(S): 5 TABLET ORAL at 22:27

## 2024-06-02 RX ADMIN — METHOCARBAMOL 750 MILLIGRAM(S): 500 TABLET, FILM COATED ORAL at 05:54

## 2024-06-02 RX ADMIN — OXYCODONE HYDROCHLORIDE 20 MILLIGRAM(S): 5 TABLET ORAL at 18:01

## 2024-06-02 RX ADMIN — Medication 15 MILLIGRAM(S): at 18:25

## 2024-06-02 RX ADMIN — CARVEDILOL PHOSPHATE 3.12 MILLIGRAM(S): 80 CAPSULE, EXTENDED RELEASE ORAL at 18:21

## 2024-06-02 NOTE — PROGRESS NOTE ADULT - PROBLEM SELECTOR PROBLEM 5
Diabetic ulcer of toe

## 2024-06-02 NOTE — PROGRESS NOTE ADULT - PROBLEM SELECTOR PLAN 2
CT maxilo:  Similar-appearing soft tissue stranding in the premalar subcutaneous fat   right cheek which extends into the buccinator fat with resolved air   collection centrally. Tiny amount of ill-defined fluid within the   inflamed region without drainable fluid collection. Findings favor   phlegmonous changes, if there is clinical signs for infection.  exam significantly improved today  -Plastic surgery attempted aspiration- no pus drained  - s/p Unasyn,  transition to po Augmentin for total 14 days
LAB CALLED EARLIER THAT THEY NEED A REDRAW FOR THE PATIENT'S TROPONIN BECAUSE 
THEY ARE UNABLE TO PROCESS IT AT THIS TIME. SPOKE WITH PT AND SHE IS REFUSING A 
BLOOD DRAW BECAUSE SHE DOESNT WANT TO LOSE HER BED AT [facility]. PT EDUCATED ON 
IMPORTANCE OF PROPONIN LEVEL TO ASSESS POSSIBLE HEART ATTACK, PT STATES SHE 
BELIEVES SHE IS JUST ANXIOUS AND WANTS TO LEAVE. DR. FISHER AWARE AND SPOKE 
WITH PT EARLIER. PT TO BE DISCHARGED. PT EDUCATED ON S/S OF HEART ATTACKS OR 
WORSTENING SYMPTOMS. PT AGREEABLE, STEADY AMBULATING, REFUSED TAXI VOUCHER.
CT maxilo:  Similar-appearing soft tissue stranding in the premalar subcutaneous fat   right cheek which extends into the buccinator fat with resolved air   collection centrally. Tiny amount of ill-defined fluid within the   inflamed region without drainable fluid collection. Findings favor   phlegmonous changes, if there is clinical signs for infection.  exam significantly improved today  -Plastic surgery attempted aspiration- no pus drained  - c/w Unasyn, if continues to improve can likely transition to po Augmentin for total 14 days
CT maxilo:  Similar-appearing soft tissue stranding in the premalar subcutaneous fat   right cheek which extends into the buccinator fat with resolved air   collection centrally. Tiny amount of ill-defined fluid within the   inflamed region without drainable fluid collection. Findings favor   phlegmonous changes, if there is clinical signs for infection.  - c/w Unasyn  - f.u plastic surgery recs
CT maxilo:  Similar-appearing soft tissue stranding in the premalar subcutaneous fat   right cheek which extends into the buccinator fat with resolved air   collection centrally. Tiny amount of ill-defined fluid within the   inflamed region without drainable fluid collection. Findings favor   phlegmonous changes, if there is clinical signs for infection.  exam significantly improved today  -Plastic surgery attempted aspiration- no pus drained  - s/p Unasyn,  transition to po Augmentin for total 14 days
CT maxilo:  Similar-appearing soft tissue stranding in the premalar subcutaneous fat   right cheek which extends into the buccinator fat with resolved air   collection centrally. Tiny amount of ill-defined fluid within the   inflamed region without drainable fluid collection. Findings favor   phlegmonous changes, if there is clinical signs for infection.  exam significantly improved today  -Plastic surgery attempted aspiration- no pus drained  - c/w Unasyn, if continues to improve can likely transition to po Augmentin for total 14 days

## 2024-06-02 NOTE — PROGRESS NOTE ADULT - PROBLEM SELECTOR PLAN 5
superficial   - no specific wound care required  -OP podiatry

## 2024-06-02 NOTE — PROGRESS NOTE ADULT - SUBJECTIVE AND OBJECTIVE BOX
[facility] Division of Hospital Medicine  Paige Yañez MD  Available via MS Teams  Pager: [phone]    SUBJECTIVE / OVERNIGHT EVENTS:  no events. pt reports severe pain, "flare up" of her chronic pain, states what works for her is IV pain medications, endorses has been given Ketamine in the past    MEDICATIONS  (STANDING):  acetaminophen     Tablet .. 650 milliGRAM(s) Oral every 6 hours  ampicillin/sulbactam  IVPB 3 Gram(s) IV Intermittent every 6 hours  atorvastatin 20 milliGRAM(s) Oral at bedtime  buMETAnide 2 milliGRAM(s) Oral daily  carvedilol 3.125 milliGRAM(s) Oral every 12 hours  chlorhexidine 2% Cloths 1 Application(s) Topical daily  dextrose 10% Bolus 125 milliLiter(s) IV Bolus once  dextrose 5%. 1000 milliLiter(s) (50 mL/Hr) IV Continuous <Continuous>  dextrose 5%. 1000 milliLiter(s) (100 mL/Hr) IV Continuous <Continuous>  dextrose 50% Injectable 25 Gram(s) IV Push once  dextrose 50% Injectable 12.5 Gram(s) IV Push once  enoxaparin Injectable 60 milliGRAM(s) SubCutaneous every 12 hours  glucagon  Injectable 1 milliGRAM(s) IntraMuscular once  insulin detemir injectable (LEVEMIR) 54 Unit(s) SubCutaneous at bedtime  insulin lispro (ADMELOG) corrective regimen sliding scale   SubCutaneous at bedtime  insulin lispro (ADMELOG) corrective regimen sliding scale   SubCutaneous three times a day before meals  insulin lispro Injectable (ADMELOG) 12 Unit(s) SubCutaneous three times a day before meals  lidocaine 1%/epinephrine 1:100,000 Inj 20 milliLiter(s) Local Injection once  lisinopril 5 milliGRAM(s) Oral daily  methocarbamol 750 milliGRAM(s) Oral every 8 hours  montelukast 10 milliGRAM(s) Oral daily  pantoprazole    Tablet 40 milliGRAM(s) Oral before breakfast  pregabalin 100 milliGRAM(s) Oral three times a day    MEDICATIONS  (PRN):  albuterol    90 MICROgram(s) HFA Inhaler 2 Puff(s) Inhalation every 6 hours PRN Bronchospasm  dextrose Oral Gel 15 Gram(s) Oral once PRN Blood Glucose LESS THAN 70 milliGRAM(s)/deciliter  lidocaine   4% Patch 1 Patch Transdermal daily PRN back pain  melatonin 6 milliGRAM(s) Oral at bedtime PRN Insomnia  naloxegol 25 milliGRAM(s) Oral daily PRN constipation  oxyCODONE    IR 20 milliGRAM(s) Oral three times a day PRN for severe pain      I&O's Summary    29 May 2024 07:01  -  29 May 2024 14:14  --------------------------------------------------------  IN: 250 mL / OUT: 0 mL / NET: 250 mL        PHYSICAL EXAM:  Vital Signs Last 24 Hrs  T(C): 36.7 (29 May 2024 04:30), Max: 36.9 (28 May 2024 22:26)  T(F): 98 (29 May 2024 04:30), Max: 98.4 (28 May 2024 22:26)  HR: 58 (29 May 2024 04:30) (58 - 68)  BP: 131/73 (29 May 2024 04:30) (131/73 - 149/90)  BP(mean): --  RR: 17 (29 May 2024 04:30) (16 - 20)  SpO2: 97% (29 May 2024 04:30) (97% - 97%)    Parameters below as of 29 May 2024 04:30  Patient On (Oxygen Delivery Method): room air      CONSTITUTIONAL: NAD  EYES: PERRLA; conjunctiva and sclera clear  ENMT: Right sided facial swelling  NECK: Supple, no palpable masses  RESPIRATORY: Normal respiratory effort; lungs are clear to auscultation bilaterally  CARDIOVASCULAR: Regular rate and rhythm, normal S1 and S2, no murmur/rub/gallop; No lower extremity edema; Peripheral pulses are 2+ bilaterally  ABDOMEN: Nontender to palpation, normoactive bowel sounds, no rebound/guarding  MUSCULOSKELETAL:  no clubbing or cyanosis of digits; no joint swelling or tenderness to palpation  PSYCH: A+O to person, place, and time; affect appropriate  NEUROLOGY: CN 2-12 are intact and symmetric; no gross sensory deficits   SKIN: No rashes; no palpable lesions    LABS:                        11.7   9.90  )-----------( 495      ( 29 May 2024 06:27 )             35.4     05-29    137  |  101  |  18  ----------------------------<  145<H>  3.5   |  21<L>  |  0.99    Ca    8.6      29 May 2024 06:27  Phos  4.7     05-29  Mg     1.20     05-29    TPro  7.1  /  Alb  3.5  /  TBili  0.4  /  DBili  x   /  AST  13  /  ALT  17  /  AlkPhos  127<H>  05-29          Urinalysis Basic - ( 29 May 2024 06:27 )    Color: x / Appearance: x / SG: x / pH: x  Gluc: 145 mg/dL / Ketone: x  / Bili: x / Urobili: x   Blood: x / Protein: x / Nitrite: x   Leuk Esterase: x / RBC: x / WBC x   Sq Epi: x / Non Sq Epi: x / Bacteria: x        SARS-CoV-2: NotDetec (25 Mar 2024 15:31)  SARS-CoV-2: NotDetec (08 Dec 2023 01:28)            
[facility] Division of Hospital Medicine  Paige Yañez MD  Available via MS Teams  Pager: [phone]    SUBJECTIVE / OVERNIGHT EVENTS:  no events. pt continues to complain about pain, is not relieved by pain medications, requesting IV dilaudid  Explained extensively IV dilaudid should not be given for chronic pain  pain management to evaluate  seen by plastic surgery, aspiration performed- no pus aspirated    MEDICATIONS  (STANDING):  acetaminophen     Tablet .. 650 milliGRAM(s) Oral every 6 hours  ampicillin/sulbactam  IVPB 3 Gram(s) IV Intermittent every 6 hours  atorvastatin 20 milliGRAM(s) Oral at bedtime  buMETAnide 2 milliGRAM(s) Oral daily  carvedilol 3.125 milliGRAM(s) Oral every 12 hours  chlorhexidine 2% Cloths 1 Application(s) Topical daily  dextrose 10% Bolus 125 milliLiter(s) IV Bolus once  dextrose 5%. 1000 milliLiter(s) (50 mL/Hr) IV Continuous <Continuous>  dextrose 5%. 1000 milliLiter(s) (100 mL/Hr) IV Continuous <Continuous>  dextrose 50% Injectable 25 Gram(s) IV Push once  dextrose 50% Injectable 12.5 Gram(s) IV Push once  enoxaparin Injectable 60 milliGRAM(s) SubCutaneous every 12 hours  glucagon  Injectable 1 milliGRAM(s) IntraMuscular once  insulin detemir injectable (LEVEMIR) 54 Unit(s) SubCutaneous at bedtime  insulin lispro (ADMELOG) corrective regimen sliding scale   SubCutaneous at bedtime  insulin lispro (ADMELOG) corrective regimen sliding scale   SubCutaneous three times a day before meals  insulin lispro Injectable (ADMELOG) 12 Unit(s) SubCutaneous three times a day before meals  lidocaine 1%/epinephrine 1:100,000 Inj 20 milliLiter(s) Local Injection once  lisinopril 5 milliGRAM(s) Oral daily  methocarbamol 750 milliGRAM(s) Oral every 8 hours  montelukast 10 milliGRAM(s) Oral daily  pantoprazole    Tablet 40 milliGRAM(s) Oral before breakfast  polyethylene glycol 3350 17 Gram(s) Oral daily  pregabalin 100 milliGRAM(s) Oral three times a day  senna 2 Tablet(s) Oral at bedtime    MEDICATIONS  (PRN):  albuterol    90 MICROgram(s) HFA Inhaler 2 Puff(s) Inhalation every 6 hours PRN Bronchospasm  dextrose Oral Gel 15 Gram(s) Oral once PRN Blood Glucose LESS THAN 70 milliGRAM(s)/deciliter  lidocaine   4% Patch 1 Patch Transdermal daily PRN R flank pain  lidocaine   4% Patch 1 Patch Transdermal daily PRN back pain  melatonin 6 milliGRAM(s) Oral at bedtime PRN Insomnia  naloxegol 25 milliGRAM(s) Oral daily PRN constipation  oxyCODONE    IR 20 milliGRAM(s) Oral three times a day PRN for severe pain      I&O's Summary    29 May 2024 07:01  -  30 May 2024 07:00  --------------------------------------------------------  IN: 1550 mL / OUT: 700 mL / NET: 850 mL        PHYSICAL EXAM:  Vital Signs Last 24 Hrs  T(C): 36.5 (30 May 2024 13:27), Max: 36.9 (29 May 2024 21:00)  T(F): 97.7 (30 May 2024 13:27), Max: 98.5 (29 May 2024 21:00)  HR: 52 (30 May 2024 13:27) (52 - 89)  BP: 127/55 (30 May 2024 13:27) (124/50 - 146/84)  BP(mean): --  RR: 18 (30 May 2024 13:27) (16 - 18)  SpO2: 96% (30 May 2024 13:27) (96% - 100%)    Parameters below as of 30 May 2024 13:27  Patient On (Oxygen Delivery Method): room air      CONSTITUTIONAL: NAD  EYES: PERRLA; conjunctiva and sclera clear  ENMT: Moist oral mucosa, no pharyngeal injection or exudates, right facial mild swelling  NECK: Supple, no palpable masses  RESPIRATORY: Normal respiratory effort; lungs are clear to auscultation bilaterally  CARDIOVASCULAR: Regular rate and rhythm, normal S1 and S2, no murmur/rub/gallop; No lower extremity edema; Peripheral pulses are 2+ bilaterally  ABDOMEN: Nontender to palpation, normoactive bowel sounds, no rebound/guarding  MUSCULOSKELETAL:  no clubbing or cyanosis of digits; no joint swelling or tenderness to palpation  PSYCH: A+O to person, place, and time; affect appropriate  NEUROLOGY: CN 2-12 are intact and symmetric; no gross sensory deficits   SKIN: No rashes; no palpable lesions    LABS:                        11.2   7.55  )-----------( 443      ( 30 May 2024 06:30 )             35.1     05-30    135  |  101  |  16  ----------------------------<  371<H>  4.3   |  19<L>  |  0.75    Ca    8.6      30 May 2024 06:30  Phos  3.8     05-30  Mg     1.60     05-30    TPro  7.1  /  Alb  3.5  /  TBili  0.4  /  DBili  x   /  AST  13  /  ALT  17  /  AlkPhos  127<H>  05-29          Urinalysis Basic - ( 30 May 2024 06:30 )    Color: x / Appearance: x / SG: x / pH: x  Gluc: 371 mg/dL / Ketone: x  / Bili: x / Urobili: x   Blood: x / Protein: x / Nitrite: x   Leuk Esterase: x / RBC: x / WBC x   Sq Epi: x / Non Sq Epi: x / Bacteria: x        SARS-CoV-2: NotDetec (25 Mar 2024 15:31)  SARS-CoV-2: NotDetec (08 Dec 2023 01:28)          
[facility] Division of Hospital Medicine  Paige Yañez MD  Available via MS Teams  Pager: [phone]    SUBJECTIVE / OVERNIGHT EVENTS:    pt states had a difficult night last night due to pain, IV toradol/IV tylenol provides some relief    MEDICATIONS  (STANDING):  amoxicillin  875 milliGRAM(s)/clavulanate 1 Tablet(s) Oral two times a day  atorvastatin 20 milliGRAM(s) Oral at bedtime  buMETAnide 2 milliGRAM(s) Oral daily  carvedilol 3.125 milliGRAM(s) Oral every 12 hours  chlorhexidine 2% Cloths 1 Application(s) Topical daily  dextrose 10% Bolus 125 milliLiter(s) IV Bolus once  dextrose 5%. 1000 milliLiter(s) (100 mL/Hr) IV Continuous <Continuous>  dextrose 5%. 1000 milliLiter(s) (50 mL/Hr) IV Continuous <Continuous>  dextrose 50% Injectable 25 Gram(s) IV Push once  dextrose 50% Injectable 12.5 Gram(s) IV Push once  enoxaparin Injectable 60 milliGRAM(s) SubCutaneous every 12 hours  glucagon  Injectable 1 milliGRAM(s) IntraMuscular once  insulin detemir injectable (LEVEMIR) 56 Unit(s) SubCutaneous at bedtime  insulin lispro (ADMELOG) corrective regimen sliding scale   SubCutaneous three times a day before meals  insulin lispro (ADMELOG) corrective regimen sliding scale   SubCutaneous at bedtime  insulin lispro Injectable (ADMELOG) 14 Unit(s) SubCutaneous three times a day before meals  ketorolac   Injectable 15 milliGRAM(s) IV Push every 6 hours  lidocaine 1%/epinephrine 1:100,000 Inj 20 milliLiter(s) Local Injection once  lisinopril 5 milliGRAM(s) Oral daily  methocarbamol 750 milliGRAM(s) Oral every 8 hours  montelukast 10 milliGRAM(s) Oral daily  pantoprazole    Tablet 40 milliGRAM(s) Oral before breakfast  polyethylene glycol 3350 17 Gram(s) Oral daily  pregabalin 100 milliGRAM(s) Oral three times a day  senna 2 Tablet(s) Oral at bedtime    MEDICATIONS  (PRN):  albuterol    90 MICROgram(s) HFA Inhaler 2 Puff(s) Inhalation every 6 hours PRN Bronchospasm  dextrose Oral Gel 15 Gram(s) Oral once PRN Blood Glucose LESS THAN 70 milliGRAM(s)/deciliter  lidocaine   4% Patch 1 Patch Transdermal daily PRN back pain  lidocaine   4% Patch 1 Patch Transdermal daily PRN R flank pain  melatonin 6 milliGRAM(s) Oral at bedtime PRN Insomnia  naloxegol 25 milliGRAM(s) Oral daily PRN constipation  oxyCODONE    IR 20 milliGRAM(s) Oral three times a day PRN for severe pain      I&O's Summary      PHYSICAL EXAM:  Vital Signs Last 24 Hrs  T(C): 36.6 (02 Jun 2024 05:30), Max: 37.1 (01 Jun 2024 13:34)  T(F): 97.8 (02 Jun 2024 05:30), Max: 98.7 (01 Jun 2024 13:34)  HR: 64 (02 Jun 2024 05:30) (53 - 66)  BP: 148/70 (02 Jun 2024 05:30) (134/57 - 148/70)  BP(mean): --  RR: 18 (02 Jun 2024 05:30) (18 - 18)  SpO2: 100% (02 Jun 2024 05:30) (96% - 100%)    Parameters below as of 02 Jun 2024 05:30  Patient On (Oxygen Delivery Method): room air      CONSTITUTIONAL: NAD  EYES: PERRLA; conjunctiva and sclera clear  ENMT: Moist oral mucosa, no pharyngeal injection or exudates  NECK: Supple, no palpable masses  RESPIRATORY: Normal respiratory effort; lungs are clear to auscultation bilaterally  CARDIOVASCULAR: Regular rate and rhythm, normal S1 and S2, no murmur/rub/gallop; No lower extremity edema; Peripheral pulses are 2+ bilaterally  ABDOMEN: Nontender to palpation, normoactive bowel sounds, no rebound/guarding  MUSCULOSKELETAL:  no clubbing or cyanosis of digits; no joint swelling or tenderness to palpation  PSYCH: A+O to person, place, and time; affect appropriate  NEUROLOGY: CN 2-12 are intact and symmetric; no gross sensory deficits   SKIN: No rashes; no palpable lesions    LABS:    06-02    136  |  99  |  20  ----------------------------<  205<H>  3.9   |  22  |  0.93    Ca    8.9      02 Jun 2024 07:03  Phos  5.4     06-02  Mg     1.50     06-02            Urinalysis Basic - ( 02 Jun 2024 07:03 )    Color: x / Appearance: x / SG: x / pH: x  Gluc: 205 mg/dL / Ketone: x  / Bili: x / Urobili: x   Blood: x / Protein: x / Nitrite: x   Leuk Esterase: x / RBC: x / WBC x   Sq Epi: x / Non Sq Epi: x / Bacteria: x        SARS-CoV-2: NotDetec (25 Mar 2024 15:31)  SARS-CoV-2: NotDetec (08 Dec 2023 01:28)          
[facility] Division of Hospital Medicine  Paige Yañez MD  Available via MS Teams  Pager: [phone]    SUBJECTIVE / OVERNIGHT EVENTS:  pt reports pain is improving with IV toradol, feels improved    MEDICATIONS  (STANDING):  acetaminophen   IVPB .. 1000 milliGRAM(s) IV Intermittent once  amoxicillin  875 milliGRAM(s)/clavulanate 1 Tablet(s) Oral two times a day  atorvastatin 20 milliGRAM(s) Oral at bedtime  buMETAnide 2 milliGRAM(s) Oral daily  carvedilol 3.125 milliGRAM(s) Oral every 12 hours  chlorhexidine 2% Cloths 1 Application(s) Topical daily  dextrose 10% Bolus 125 milliLiter(s) IV Bolus once  dextrose 5%. 1000 milliLiter(s) (100 mL/Hr) IV Continuous <Continuous>  dextrose 5%. 1000 milliLiter(s) (50 mL/Hr) IV Continuous <Continuous>  dextrose 50% Injectable 25 Gram(s) IV Push once  dextrose 50% Injectable 12.5 Gram(s) IV Push once  enoxaparin Injectable 60 milliGRAM(s) SubCutaneous every 12 hours  glucagon  Injectable 1 milliGRAM(s) IntraMuscular once  insulin detemir injectable (LEVEMIR) 56 Unit(s) SubCutaneous at bedtime  insulin lispro (ADMELOG) corrective regimen sliding scale   SubCutaneous three times a day before meals  insulin lispro (ADMELOG) corrective regimen sliding scale   SubCutaneous at bedtime  insulin lispro Injectable (ADMELOG) 14 Unit(s) SubCutaneous three times a day before meals  ketorolac   Injectable 15 milliGRAM(s) IV Push every 6 hours  lidocaine 1%/epinephrine 1:100,000 Inj 20 milliLiter(s) Local Injection once  lisinopril 5 milliGRAM(s) Oral daily  methocarbamol 750 milliGRAM(s) Oral every 8 hours  montelukast 10 milliGRAM(s) Oral daily  pantoprazole    Tablet 40 milliGRAM(s) Oral before breakfast  polyethylene glycol 3350 17 Gram(s) Oral daily  pregabalin 100 milliGRAM(s) Oral three times a day  senna 2 Tablet(s) Oral at bedtime    MEDICATIONS  (PRN):  albuterol    90 MICROgram(s) HFA Inhaler 2 Puff(s) Inhalation every 6 hours PRN Bronchospasm  dextrose Oral Gel 15 Gram(s) Oral once PRN Blood Glucose LESS THAN 70 milliGRAM(s)/deciliter  lidocaine   4% Patch 1 Patch Transdermal daily PRN R flank pain  lidocaine   4% Patch 1 Patch Transdermal daily PRN back pain  melatonin 6 milliGRAM(s) Oral at bedtime PRN Insomnia  naloxegol 25 milliGRAM(s) Oral daily PRN constipation  oxyCODONE    IR 20 milliGRAM(s) Oral three times a day PRN for severe pain      I&O's Summary    31 May 2024 07:01  -  01 Jun 2024 07:00  --------------------------------------------------------  IN: 670 mL / OUT: 1700 mL / NET: -1030 mL        PHYSICAL EXAM:  Vital Signs Last 24 Hrs  T(C): 36.7 (01 Jun 2024 06:10), Max: 36.7 (31 May 2024 21:05)  T(F): 98 (01 Jun 2024 06:10), Max: 98 (31 May 2024 21:05)  HR: 60 (01 Jun 2024 06:10) (53 - 60)  BP: 153/77 (01 Jun 2024 06:10) (130/60 - 153/77)  BP(mean): --  RR: 18 (01 Jun 2024 06:10) (18 - 18)  SpO2: 100% (01 Jun 2024 06:10) (98% - 100%)    Parameters below as of 01 Jun 2024 06:10  Patient On (Oxygen Delivery Method): room air      CONSTITUTIONAL: NAD  EYES: PERRLA; conjunctiva and sclera clear  ENMT: Moist oral mucosa, no pharyngeal injection or exudates  NECK: Supple, no palpable masses  RESPIRATORY: Normal respiratory effort; lungs are clear to auscultation bilaterally  CARDIOVASCULAR: Regular rate and rhythm, normal S1 and S2, no murmur/rub/gallop; No lower extremity edema; Peripheral pulses are 2+ bilaterally  ABDOMEN: Nontender to palpation, normoactive bowel sounds, no rebound/guarding  MUSCULOSKELETAL:  no clubbing or cyanosis of digits; no joint swelling or tenderness to palpation  PSYCH: A+O to person, place, and time; affect appropriate  NEUROLOGY: CN 2-12 are intact and symmetric; no gross sensory deficits   SKIN: No rashes; no palpable lesions    LABS:                        11.5   8.38  )-----------( 438      ( 31 May 2024 06:20 )             34.5     05-31    136  |  100  |  15  ----------------------------<  226<H>  3.9   |  21<L>  |  0.80    Ca    8.9      31 May 2024 06:20  Phos  4.3     05-31  Mg     1.40     05-31    TPro  7.0  /  Alb  3.4  /  TBili  0.4  /  DBili  x   /  AST  14  /  ALT  17  /  AlkPhos  121<H>  05-31          Urinalysis Basic - ( 31 May 2024 06:20 )    Color: x / Appearance: x / SG: x / pH: x  Gluc: 226 mg/dL / Ketone: x  / Bili: x / Urobili: x   Blood: x / Protein: x / Nitrite: x   Leuk Esterase: x / RBC: x / WBC x   Sq Epi: x / Non Sq Epi: x / Bacteria: x        SARS-CoV-2: NotDetec (25 Mar 2024 15:31)  SARS-CoV-2: NotDetec (08 Dec 2023 01:28)            
[facility] Division of Hospital Medicine  Paige Yañez MD  Available via MS Teams  Pager: [phone]    SUBJECTIVE / OVERNIGHT EVENTS:  no events. pt reports her pain is severe and needs assistance with flair, states wants to know more about Ketamine, explained that can't be given in the floors    MEDICATIONS  (STANDING):  acetaminophen     Tablet .. 650 milliGRAM(s) Oral every 6 hours  ampicillin/sulbactam  IVPB 3 Gram(s) IV Intermittent every 6 hours  atorvastatin 20 milliGRAM(s) Oral at bedtime  buMETAnide 2 milliGRAM(s) Oral daily  carvedilol 3.125 milliGRAM(s) Oral every 12 hours  chlorhexidine 2% Cloths 1 Application(s) Topical daily  dextrose 10% Bolus 125 milliLiter(s) IV Bolus once  dextrose 5%. 1000 milliLiter(s) (50 mL/Hr) IV Continuous <Continuous>  dextrose 5%. 1000 milliLiter(s) (100 mL/Hr) IV Continuous <Continuous>  dextrose 50% Injectable 25 Gram(s) IV Push once  dextrose 50% Injectable 12.5 Gram(s) IV Push once  enoxaparin Injectable 60 milliGRAM(s) SubCutaneous every 12 hours  glucagon  Injectable 1 milliGRAM(s) IntraMuscular once  insulin detemir injectable (LEVEMIR) 54 Unit(s) SubCutaneous at bedtime  insulin lispro (ADMELOG) corrective regimen sliding scale   SubCutaneous at bedtime  insulin lispro (ADMELOG) corrective regimen sliding scale   SubCutaneous three times a day before meals  insulin lispro Injectable (ADMELOG) 12 Unit(s) SubCutaneous three times a day before meals  ketorolac   Injectable 15 milliGRAM(s) IV Push every 6 hours  lidocaine 1%/epinephrine 1:100,000 Inj 20 milliLiter(s) Local Injection once  lisinopril 5 milliGRAM(s) Oral daily  methocarbamol 750 milliGRAM(s) Oral every 8 hours  montelukast 10 milliGRAM(s) Oral daily  pantoprazole    Tablet 40 milliGRAM(s) Oral before breakfast  polyethylene glycol 3350 17 Gram(s) Oral daily  pregabalin 100 milliGRAM(s) Oral three times a day  senna 2 Tablet(s) Oral at bedtime    MEDICATIONS  (PRN):  albuterol    90 MICROgram(s) HFA Inhaler 2 Puff(s) Inhalation every 6 hours PRN Bronchospasm  dextrose Oral Gel 15 Gram(s) Oral once PRN Blood Glucose LESS THAN 70 milliGRAM(s)/deciliter  lidocaine   4% Patch 1 Patch Transdermal daily PRN back pain  lidocaine   4% Patch 1 Patch Transdermal daily PRN R flank pain  melatonin 6 milliGRAM(s) Oral at bedtime PRN Insomnia  naloxegol 25 milliGRAM(s) Oral daily PRN constipation  oxyCODONE    IR 20 milliGRAM(s) Oral three times a day PRN for severe pain      I&O's Summary      PHYSICAL EXAM:  Vital Signs Last 24 Hrs  T(C): 36.7 (31 May 2024 13:12), Max: 36.7 (31 May 2024 13:12)  T(F): 98 (31 May 2024 13:12), Max: 98 (31 May 2024 13:12)  HR: 56 (31 May 2024 13:12) (56 - 71)  BP: 122/56 (31 May 2024 13:12) (122/56 - 153/73)  BP(mean): --  RR: 17 (31 May 2024 13:12) (17 - 20)  SpO2: 96% (31 May 2024 13:12) (96% - 97%)    Parameters below as of 31 May 2024 13:12  Patient On (Oxygen Delivery Method): room air      CONSTITUTIONAL: NAD  EYES: PERRLA; conjunctiva and sclera clear  ENMT: Moist oral mucosa, no pharyngeal injection or exudates  NECK: Supple, no palpable masses  RESPIRATORY: Normal respiratory effort; lungs are clear to auscultation bilaterally  CARDIOVASCULAR: Regular rate and rhythm, normal S1 and S2, no murmur/rub/gallop; No lower extremity edema; Peripheral pulses are 2+ bilaterally  ABDOMEN: Nontender to palpation, normoactive bowel sounds, no rebound/guarding  MUSCULOSKELETAL:  no clubbing or cyanosis of digits; no joint swelling or tenderness to palpation  PSYCH: A+O to person, place, and time; affect appropriate  NEUROLOGY: CN 2-12 are intact and symmetric; no gross sensory deficits   SKIN: No rashes; no palpable lesions    LABS:                        11.5   8.38  )-----------( 438      ( 31 May 2024 06:20 )             34.5     05-31    136  |  100  |  15  ----------------------------<  226<H>  3.9   |  21<L>  |  0.80    Ca    8.9      31 May 2024 06:20  Phos  4.3     05-31  Mg     1.40     05-31    TPro  7.0  /  Alb  3.4  /  TBili  0.4  /  DBili  x   /  AST  14  /  ALT  17  /  AlkPhos  121<H>  05-31          Urinalysis Basic - ( 31 May 2024 06:20 )    Color: x / Appearance: x / SG: x / pH: x  Gluc: 226 mg/dL / Ketone: x  / Bili: x / Urobili: x   Blood: x / Protein: x / Nitrite: x   Leuk Esterase: x / RBC: x / WBC x   Sq Epi: x / Non Sq Epi: x / Bacteria: x        SARS-CoV-2: NotDetec (25 Mar 2024 15:31)  SARS-CoV-2: NotDetec (08 Dec 2023 01:28)

## 2024-06-02 NOTE — PROGRESS NOTE ADULT - PROBLEM SELECTOR PROBLEM 1
Complex regional pain syndrome I

## 2024-06-02 NOTE — PROGRESS NOTE ADULT - REASON FOR ADMISSION
R flank pain, face pain, toe wound.

## 2024-06-02 NOTE — PROGRESS NOTE ADULT - PROBLEM SELECTOR PROBLEM 2
Phlegmonous cellulitis

## 2024-06-02 NOTE — PROGRESS NOTE ADULT - PROBLEM SELECTOR PLAN 3
- continue coreg, lisinopril

## 2024-06-02 NOTE — PROGRESS NOTE ADULT - TIME BILLING
Time-based billing (NON-critical care).     35 minutes spent on total encounter; more than 50% of the visit was spent counseling and / or coordinating care by the attending physician.  The necessity of the time spent during the encounter on this date of service was due to:     documentation in [place], reviewing chart and coordinating care with patient/ACP and interdisciplinary staff (such as , social workers, etc) as well as reviewing vitals, laboratory data, radiology, medication list, consultants' recommendations and prior records. Interventions were performed as documented above.
Time-based billing (NON-critical care).     50 minutes spent on total encounter; more than 50% of the visit was spent counseling and / or coordinating care by the attending physician.  The necessity of the time spent during the encounter on this date of service was due to:     documentation in [place], reviewing chart and coordinating care with patient/ACP and interdisciplinary staff (such as , social workers, etc) as well as reviewing vitals, laboratory data, radiology, medication list, consultants' recommendations and prior records. Interventions were performed as documented above.
Time-based billing (NON-critical care).     50 minutes spent on total encounter; more than 50% of the visit was spent counseling and / or coordinating care by the attending physician.  The necessity of the time spent during the encounter on this date of service was due to:     documentation in [place], reviewing chart and coordinating care with patient/ACP and interdisciplinary staff (such as , social workers, etc) as well as reviewing vitals, laboratory data, radiology, medication list, consultants' recommendations and prior records. Interventions were performed as documented above.
Time-based billing (NON-critical care).     50 minutes spent on total encounter; more than 50% of the visit was spent counseling and / or coordinating care by the attending physician.  The necessity of the time spent during the encounter on this date of service was due to:     documentation in [place], reviewing chart and coordinating care with patient/ACP and interdisciplinary staff (such as , social workers, etc) as well as reviewing vitals, laboratory data, radiology, medication list, consultants' recommendations and prior records. Interventions were performed as documented above.

## 2024-06-02 NOTE — PROGRESS NOTE ADULT - ASSESSMENT
53 y/o F with pmh of DM type 2, HTN, intercostal neuralgia, complex regional pain syndrome with spinal cord stimulator p/w pain in R flank pain, face swelling      
53 y/o F with pmh of DM type 2, HTN, intercostal neuralgia, complex regional pain syndrome with spinal cord stimulator p/w pain in R flank pain, face swelling      
53 y/o F with pmh of DM type 2, HTN, intercostal neuralgia, complex regional pain syndrome with spinal cord stimulator p/w pain in R flank pain, face issues      
55 y/o F with pmh of DM type 2, HTN, intercostal neuralgia, complex regional pain syndrome with spinal cord stimulator p/w pain in R flank pain, face swelling      
53 y/o F with pmh of DM type 2, HTN, intercostal neuralgia, complex regional pain syndrome with spinal cord stimulator p/w pain in R flank, face, and R toe wound.

## 2024-06-02 NOTE — PROGRESS NOTE ADULT - PROBLEM SELECTOR PLAN 4
- hold metformin  - substitute Levemir for Tresiba, Admelog for Novolog  - a1c 8.2

## 2024-06-02 NOTE — PROGRESS NOTE ADULT - PROBLEM SELECTOR PLAN 1
s/p IV Dilaudid in ED and on floor.   chronic pain  CT L-spine without acute findings  -c/w oral oxycodone 20mg q8H PRN for severe pain  - continue methocarbamol   -chronic pain consulted, no indication for IV opioids  -c/w IV toradol q6H standing for 2days, IV tylenol 1-2x day  -discussed with pain management: Ketamine can't be given in floors, pain provided list of OP providers that might be able to do OP Ketamine, given to patient 6/1
s/p IV Dilaudid in ED and on floor.   flare of chronic pain  CT L-spine without acute findings  -c/w oral oxycodone 20mg q8H PRN for severe pain  - continue methocarbamol   -chronic pain consult
s/p IV Dilaudid in ED and on floor.   chronic pain  CT L-spine without acute findings  -c/w oral oxycodone 20mg q8H PRN for severe pain  - continue methocarbamol   -chronic pain consulted, no indication for IV opioids  -c/w IV toradol q6H standing for 3 days, IV tylenol 1-2x day  -discussed with pain management: Ketamine can't be given in floors, pain provided list of OP providers that might be able to do OP Ketamine, given to patient 6/1
s/p IV Dilaudid in ED and on floor.   chronic pain  CT L-spine without acute findings  -c/w oral oxycodone 20mg q8H PRN for severe pain  - continue methocarbamol   -chronic pain consulted, no indication for IV opioids  -start IV toradol q6H standing, IV tylenol x1  -discussed with pain management: Ketamine can't be given in floors, will have to be on ICU monitoring, pain provided list of OP providers that might be able to do OP Ketamine, will provide to patient
s/p IV Dilaudid in ED and on floor.   chronic pain  CT L-spine without acute findings  -c/w oral oxycodone 20mg q8H PRN for severe pain  - continue methocarbamol   -chronic pain consulted, follow up recs

## 2024-06-03 ENCOUNTER — TRANSCRIPTION ENCOUNTER (OUTPATIENT)
Age: 54
End: 2024-06-03

## 2024-06-03 VITALS
DIASTOLIC BLOOD PRESSURE: 56 MMHG | HEART RATE: 60 BPM | SYSTOLIC BLOOD PRESSURE: 126 MMHG | TEMPERATURE: 98 F | OXYGEN SATURATION: 97 % | RESPIRATION RATE: 18 BRPM

## 2024-06-03 LAB
GLUCOSE BLDC GLUCOMTR-MCNC: 174 MG/DL — HIGH (ref 70–99)
GLUCOSE BLDC GLUCOMTR-MCNC: 207 MG/DL — HIGH (ref 70–99)

## 2024-06-03 PROCEDURE — 99239 HOSP IP/OBS DSCHRG MGMT >30: CPT

## 2024-06-03 RX ORDER — OXYCODONE HYDROCHLORIDE 5 MG/1
1 TABLET ORAL
Qty: 15 | Refills: 0
Start: 2024-06-03 | End: 2024-06-07

## 2024-06-03 RX ORDER — ACETAMINOPHEN 500 MG
1000 TABLET ORAL ONCE
Refills: 0 | Status: COMPLETED | OUTPATIENT
Start: 2024-06-03 | End: 2024-06-03

## 2024-06-03 RX ORDER — OXYCODONE HYDROCHLORIDE 5 MG/1
1 TABLET ORAL
Refills: 0 | DISCHARGE

## 2024-06-03 RX ORDER — CYCLOBENZAPRINE HYDROCHLORIDE 10 MG/1
1 TABLET, FILM COATED ORAL
Qty: 9 | Refills: 0
Start: 2024-06-03 | End: 2024-06-05

## 2024-06-03 RX ORDER — METHOCARBAMOL 500 MG/1
1 TABLET, FILM COATED ORAL
Qty: 90 | Refills: 0
Start: 2024-06-03 | End: 2024-07-02

## 2024-06-03 RX ADMIN — Medication 1: at 09:05

## 2024-06-03 RX ADMIN — Medication 1 TABLET(S): at 06:32

## 2024-06-03 RX ADMIN — Medication 1000 MILLIGRAM(S): at 13:44

## 2024-06-03 RX ADMIN — Medication 1000 MILLIGRAM(S): at 02:22

## 2024-06-03 RX ADMIN — OXYCODONE HYDROCHLORIDE 20 MILLIGRAM(S): 5 TABLET ORAL at 10:23

## 2024-06-03 RX ADMIN — LISINOPRIL 5 MILLIGRAM(S): 2.5 TABLET ORAL at 06:31

## 2024-06-03 RX ADMIN — PANTOPRAZOLE SODIUM 40 MILLIGRAM(S): 20 TABLET, DELAYED RELEASE ORAL at 06:31

## 2024-06-03 RX ADMIN — Medication 100 MILLIGRAM(S): at 06:31

## 2024-06-03 RX ADMIN — Medication 100 MILLIGRAM(S): at 14:17

## 2024-06-03 RX ADMIN — Medication 2: at 13:02

## 2024-06-03 RX ADMIN — Medication 14 UNIT(S): at 09:05

## 2024-06-03 RX ADMIN — Medication 15 MILLIGRAM(S): at 06:36

## 2024-06-03 RX ADMIN — CHLORHEXIDINE GLUCONATE 1 APPLICATION(S): 213 SOLUTION TOPICAL at 12:45

## 2024-06-03 RX ADMIN — Medication 15 MILLIGRAM(S): at 01:09

## 2024-06-03 RX ADMIN — METHOCARBAMOL 750 MILLIGRAM(S): 500 TABLET, FILM COATED ORAL at 14:17

## 2024-06-03 RX ADMIN — METHOCARBAMOL 750 MILLIGRAM(S): 500 TABLET, FILM COATED ORAL at 06:31

## 2024-06-03 RX ADMIN — OXYCODONE HYDROCHLORIDE 20 MILLIGRAM(S): 5 TABLET ORAL at 11:23

## 2024-06-03 RX ADMIN — ENOXAPARIN SODIUM 60 MILLIGRAM(S): 100 INJECTION SUBCUTANEOUS at 06:32

## 2024-06-03 RX ADMIN — MONTELUKAST 10 MILLIGRAM(S): 4 TABLET, CHEWABLE ORAL at 12:45

## 2024-06-03 RX ADMIN — Medication 14 UNIT(S): at 13:03

## 2024-06-03 RX ADMIN — BUMETANIDE 2 MILLIGRAM(S): 0.25 INJECTION INTRAMUSCULAR; INTRAVENOUS at 06:31

## 2024-06-03 RX ADMIN — CARVEDILOL PHOSPHATE 3.12 MILLIGRAM(S): 80 CAPSULE, EXTENDED RELEASE ORAL at 06:32

## 2024-06-03 RX ADMIN — Medication 400 MILLIGRAM(S): at 01:22

## 2024-06-03 RX ADMIN — Medication 400 MILLIGRAM(S): at 12:44

## 2024-06-03 RX ADMIN — Medication 15 MILLIGRAM(S): at 00:09

## 2024-06-03 NOTE — CHART NOTE - NSCHARTNOTEFT_GEN_A_CORE
Robaxin requires prior authorization-patient was aware of this as [facility] be [facility].  Prescription plan prefers flexeril or xanaflex.  Submitted to transitions team via [facility], patient in agreement with using flexeril until decision made for prior auth.  If denied she will follow up as outpt with her PCP for another attempt at authorization.     Patient discharged home with flexeril sent to her preferred pharmacy, if robaxin approved [facility] will call her. Discussed w/patient and Dr. Perez

## 2024-06-03 NOTE — DISCHARGE NOTE NURSING/CASE MANAGEMENT/SOCIAL WORK - NSDCPEFALRISK_GEN_ALL_CORE
For information on Fall & Injury Prevention, visit: [email or web address] OR  [email or web address] OR  https://www.cdc.gov/steadi/patient.html

## 2024-06-03 NOTE — DISCHARGE NOTE NURSING/CASE MANAGEMENT/SOCIAL WORK - PATIENT PORTAL LINK FT
You can access the FollowMyHealth Patient Portal offered by [facility] by registering at the following website: [email or web address]. By joining [facility]’s FollowMyHealth portal, you will also be able to view your health information using other applications (apps) compatible with our system.

## 2024-06-03 NOTE — DISCHARGE NOTE NURSING/CASE MANAGEMENT/SOCIAL WORK - NSDCVIVACCINE_GEN_ALL_CORE_FT
influenza, injectable, quadrivalent, preservative free; 04-Oct-2022 15:17; Elissa Cano (RN); [employer]; [id number] (Exp. Date: 30-Jun-2023); IntraMuscular; Deltoid Left.; 0.5 milliLiter(s); VIS (VIS Published: 06-Aug-2021, VIS Presented: 04-Oct-2022);

## 2024-06-05 ENCOUNTER — APPOINTMENT (OUTPATIENT)
Dept: HOME HEALTH SERVICES | Facility: HOME HEALTH | Age: 54
End: 2024-06-05

## 2024-06-08 RX ORDER — CYCLOBENZAPRINE HYDROCHLORIDE 10 MG/1
1 TABLET, FILM COATED ORAL
Qty: 9 | Refills: 0
Start: 2024-06-08 | End: 2024-06-10

## 2024-06-11 ENCOUNTER — NON-APPOINTMENT (OUTPATIENT)
Age: 54
End: 2024-06-11

## 2024-06-12 RX ORDER — CYCLOBENZAPRINE HYDROCHLORIDE 5 MG/1
5 TABLET, FILM COATED ORAL 3 TIMES DAILY
Qty: 42 | Refills: 0 | Status: ACTIVE | COMMUNITY
Start: 1900-01-01 | End: 1900-01-01

## 2024-06-14 ENCOUNTER — APPOINTMENT (OUTPATIENT)
Dept: HOME HEALTH SERVICES | Facility: HOME HEALTH | Age: 54
End: 2024-06-14

## 2024-06-19 ENCOUNTER — NON-APPOINTMENT (OUTPATIENT)
Age: 54
End: 2024-06-19

## 2024-06-20 ENCOUNTER — NON-APPOINTMENT (OUTPATIENT)
Age: 54
End: 2024-06-20

## 2024-06-26 ENCOUNTER — NON-APPOINTMENT (OUTPATIENT)
Age: 54
End: 2024-06-26

## 2024-07-17 ENCOUNTER — NON-APPOINTMENT (OUTPATIENT)
Age: 54
End: 2024-07-17

## 2024-07-25 ENCOUNTER — NON-APPOINTMENT (OUTPATIENT)
Age: 54
End: 2024-07-25

## 2024-07-30 ENCOUNTER — NON-APPOINTMENT (OUTPATIENT)
Age: 54
End: 2024-07-30

## 2024-08-01 ENCOUNTER — NON-APPOINTMENT (OUTPATIENT)
Age: 54
End: 2024-08-01

## 2024-09-03 ENCOUNTER — NON-APPOINTMENT (OUTPATIENT)
Age: 54
End: 2024-09-03

## 2024-09-05 ENCOUNTER — APPOINTMENT (OUTPATIENT)
Dept: HOME HEALTH SERVICES | Facility: HOME HEALTH | Age: 54
End: 2024-09-05

## 2024-09-18 NOTE — PHYSICAL THERAPY INITIAL EVALUATION ADULT - LEVEL OF INDEPENDENCE: SIT/SUPINE, REHAB EVAL
[address] [facility] coding opportunities       Chart reviewed, no opportunity found: CHART REVIEWED, NO OPPORTUNITY FOUND        Patients Insurance     Medicare Insurance: Medicare ibuprofen are used for pain and can be taken every 6 hours.  Robaxin is a muscle relaxer you can take 1 tablet twice daily.  Lidocaine patches are the same thing that was placed on your back here in the ED.  Places on the area of pain and leave it on for 12 hours followed by off for 12 hours.  I would like you to limit any excessive exercise or heavy lifting in the coming days.  Your symptoms will take some time before they fully resolve.  In addition, I did write you for some melatonin.  You can chew 1 tablet (5 mg) nightly at bedtime.   independent

## 2024-09-24 ENCOUNTER — APPOINTMENT (OUTPATIENT)
Dept: HOME HEALTH SERVICES | Facility: HOME HEALTH | Age: 54
End: 2024-09-24
Payer: MEDICARE

## 2024-09-24 DIAGNOSIS — E11.49 TYPE 2 DIABETES MELLITUS WITH OTHER DIABETIC NEUROLOGICAL COMPLICATION: ICD-10-CM

## 2024-09-24 DIAGNOSIS — F31.81 BIPOLAR II DISORDER: ICD-10-CM

## 2024-09-24 DIAGNOSIS — I27.20 PULMONARY HYPERTENSION, UNSPECIFIED: ICD-10-CM

## 2024-09-24 DIAGNOSIS — I50.32 CHRONIC DIASTOLIC (CONGESTIVE) HEART FAILURE: ICD-10-CM

## 2024-09-24 PROCEDURE — 99349 HOME/RES VST EST MOD MDM 40: CPT

## 2024-09-24 NOTE — PATIENT PROFILE ADULT - IS PATIENT PREGNANT?
[FreeTextEntry8] : Patient is a 75 yr old female present today for an acute visit.  Patient presents to the office for follow-up was seen at the hospital for obstructed bile duct, had infection.  Course was complicated with norovirus E. coli.  Currently denies any pain with eating denies any fevers chills abdominal pain.  Did have ERCP which successfully remove the stones, was due for hospital cholecystectomy however ultimately deferred to outpatient cholecystectomy. Did stop taking BP medication no

## 2024-09-26 VITALS
TEMPERATURE: 97.88 F | OXYGEN SATURATION: 98 % | SYSTOLIC BLOOD PRESSURE: 140 MMHG | HEART RATE: 74 BPM | RESPIRATION RATE: 16 BRPM | DIASTOLIC BLOOD PRESSURE: 80 MMHG

## 2024-09-26 RX ORDER — BACLOFEN 10 MG/1
10 TABLET ORAL
Qty: 810 | Refills: 0 | Status: ACTIVE | COMMUNITY
Start: 2024-09-26

## 2024-09-26 RX ORDER — HYDROMORPHONE HYDROCHLORIDE 4 MG/1
4 TABLET ORAL
Refills: 0 | Status: ACTIVE | COMMUNITY
Start: 2024-09-26

## 2024-09-26 NOTE — REASON FOR VISIT
[Post-hospitalization from ___ Hospital] : Post-hospitalization from [unfilled] Hospital [Admitted on: ___] : The patient was admitted on [unfilled] [Discharged on ___] : discharged on [unfilled] [Discharge Summary] : discharge summary [Pertinent Labs] : pertinent labs [Discharge Med List] : discharge medication list [Med Reconciliation] : medication reconciliation has been completed [Patient Contacted By: ____] : and contacted by [unfilled] [Pre-Visit Preparation] : Pre-visit preparation was done [Intercurrent Specialty/Sub-specialty Visits] : The patient has intercurrent specialty/sub-specialty visits [FreeTextEntry2] : Hospital Course: Discharge Date 03-Jun-2024  Admission Date 29-May-2024 02:13 Reason for Admission R flank pain, face pain, toe wound. Hospital Course  55 y/o F with pmh of DM type 2, HTN, intercostal neuralgia, complex regional pain syndrome with spinal cord stimulator p/w pain in R flank, face, and R toe wound. For complex regional pain syndrome, patient was give oral oxycodone, IV toradol, tylenol and pain management was consulted. Patient provided a list of outpatient pain management providers. For facial phlegmonous cellulitis, patient was started on IV Unasyn, seen by plastic surgery, attempted drainage but no pus. Patient was transition to oral antibiotics on discharge to complete 2 weeks. Patient may also benefit from additional services at home due to multiple medications and comorbidities. On 6/3/2024 patient medically cleared for discharge home by Dr. Perez Patient would benefit from increased home health aide hours. Med Reconciliation: Override IMPROVE-DD recommendations due to: IMPROVE-DD Application Not Available Recommended Post-Discharge VTE Prophylaxis IMPROVE-DD Application Not Available Medication Reconciliation Status Admission Reconciliation is Completed Discharge Reconciliation is Completed Discharge Medications albuterol 90 mcg/inh inhalation aerosol: 2 puff(s) inhaled every 6 hours as needed for Bronchospasm amoxicillin-clavulanate 875 mg-125 mg oral tablet: 1 tab(s) orally 2 times a day atorvastatin 20 mg oral tablet: 1 tab(s) orally once a day (at bedtime) bumetanide 2 mg oral tablet: 1 tab(s) orally once a day carvedilol 3.125 mg oral tablet: 1 tab(s) orally every 12 hours cyclobenzaprine 10 mg oral tablet: 1 tab(s) orally 3 times a day as needed for spasm MDD: 3 Lidocare Pain Relief Patch 4% topical film: Apply topically to affected area once a day apply to lower back, right flank and mid back lisinopril 5 mg oral tablet: 1 tab(s) orally once a day metFORMIN 1000 mg oral tablet: 1 tab(s) orally 2 times a day montelukast 10 mg oral tablet: 1 tab(s) orally once a day Movantik 25 mg oral tablet: 1 tab(s) orally once a day NovoLOG FlexPen 100 units/mL injectable solution: 12 unit(s) injectable 3 times a day (before meals) oxyCODONE 20 mg oral tablet: 1 tab(s) orally 3 times a day as needed for severe pain MDD: 3 tab pantoprazole 40 mg oral delayed release tablet: 1 tab(s) orally once a day (before a meal) pregabalin 100 mg oral capsule: 1 cap(s) orally 3 times a day tirzepatide 2.5 mg/0.5 mL subcutaneous solution: 2.5 milligram(s) subcutaneously Tresiba 100 units/mL subcutaneous solution: 54 international unit(s) subcutaneous once a day (at bedtime) Care Plan/Procedures: Discharge Diagnoses, Assessment and Plan of Treatment PRINCIPAL DISCHARGE DIAGNOSIS Diagnosis: Facial infection Assessment and Plan of Treatment: You received IV antibiotics, and then transitioned to oral antibiotics to complete 2 weeks at home. SECONDARY DISCHARGE DIAGNOSES Diagnosis: Complex regional pain syndrome I Assessment and Plan of Treatment: You received your home oxycodone, tylenol, toradol. A list of pain specialists was provided to you for outpatient follow up. Your methocarbamol requires a prior authorization (flexeril is the preferred drug). We submitted for authorization.  If this is obtained [facility] will call you. If not continue to take flexeril as ordered (DO NOT TAKE BOTH FLEXERIL AND METHOCARBAMOL AT THE SAME TIME).If your prior authorization is denied your primary care provider will need to reattempt authorization. Discharge Procedures, Findings and Treatment PRINCIPAL PROCEDURE Procedure: CT maxillofacial area Findings and Treatment: IMPRESSION: Similar-appearing soft tissue stranding in the premalar subcutaneous fat right cheek which extends into the buccinator fat with resolved air collection centrally.  nflamed region without drainable fluid collection. Findings favor phlegmonous changes, if there is clinical signs for infection. SECONDARY PROCEDURE Procedure: CT lumbar spine Findings and Treatment: IMPRESSION: 1. No vertebral fracture is recognized. 2. Mild lumbar degenerative disc disease 3. Sacroiliac osteoarthritis [FreeTextEntry4] : Chart reviewed [FreeTextEntry5] : PCP, Pain Management

## 2024-09-26 NOTE — COUNSELING
[Obese (BMI >29.9)] : Obese - BMI >29.9 [DASH diet given] : DASH diet given [Non - Smoker] : non-smoker [Use assistive device to avoid falls] : use assistive device to avoid falls [Remove clutter and unsafe carpeting to avoid falls] : remove clutter and unsafe carpeting to avoid falls [Mammogram] : Mammogram [Improve mobility] : improve mobility [Improve weight] : improve weight [Improve pain control] : improve pain control [Decrease stress] : decrease stress [Decrease hospital use] : decrease hospital use [Minimize unnecessary interventions] : minimize unnecessary interventions [Completed Medical Orders for Life-Sustaining Treatment] : completed medical orders for life-sustaining treatment [Full Code] : Code Status: Full Code [No Limitations] : Treatment Guidelines: No limitations [Long Term Intubation] : Intubation: Long term intubation [[facility] Ver[facility] Date: _____] : [facility] Completion/last verification date: [unfilled] [_____] : HCP: [unfilled]

## 2024-09-26 NOTE — PHYSICAL EXAM
[No Acute Distress] : no acute distress [Normal Voice/Communication] : normal voice communication [Normal Sclera/Conjunctiva] : normal sclera/conjunctiva [PERRL] : pupils equal, round and reactive to light [Normal Outer Ear/Nose] : the ears and nose were normal in appearance [Normal Oropharynx] : the oropharynx was normal [No JVD] : no jugular venous distention [Supple] : the neck was supple [No Respiratory Distress] : no respiratory distress [Clear to Auscultation] : lungs were clear to auscultation bilaterally [No Accessory Muscle Use] : no accessory muscle use [Normal Rate] : heart rate was normal  [Regular Rhythm] : with a regular rhythm [Normal Bowel Sounds] : normal bowel sounds [Non Tender] : non-tender [Soft] : abdomen soft [No CVA Tenderness] : no ~M costovertebral angle tenderness [No Spinal Tenderness] : no spinal tenderness [Normal Gait] : normal gait [No Rash] : no rash [No Skin Lesions] : no skin lesions [Oriented x3] : oriented to person, place, and time [No Joint Swelling] : no joint swelling seen

## 2024-09-26 NOTE — COUNSELING
[Obese (BMI >29.9)] : Obese - BMI >29.9 [DASH diet given] : DASH diet given [Non - Smoker] : non-smoker [Use assistive device to avoid falls] : use assistive device to avoid falls [Remove clutter and unsafe carpeting to avoid falls] : remove clutter and unsafe carpeting to avoid falls [Mammogram] : Mammogram [Improve mobility] : improve mobility [Improve weight] : improve weight [Improve pain control] : improve pain control [Decrease stress] : decrease stress [Decrease hospital use] : decrease hospital use [Minimize unnecessary interventions] : minimize unnecessary interventions [Completed Medical Orders for Life-Sustaining Treatment] : completed medical orders for life-sustaining treatment [Full Code] : Code Status: Full Code [No Limitations] : Treatment Guidelines: No limitations [Long Term Intubation] : Intubation: Long term intubation [[facility] Date: _____] [facility] Completion/last verification date: [unfilled] [_____] : HCP: [unfilled]

## 2024-09-26 NOTE — HISTORY OF PRESENT ILLNESS
[Patient] : patient [FreeTextEntry1] [facility] [FreeTextEntry2] : COVID SCREEN: Patient or caretaker denies fever, cough, trouble breathing, rash, vomiting. Patient has not been in close contact with anyone who is COVID-19 positive, or suspected of having COVID-19. N95 mask, gloves, eye wear and gown (if indicated) used during visit: Yes.  HPI: 52yo F with PMHX of bipolar II disorder, depression, class III obesity (41 kg/m2), IBS-D, T2DM, GERD, asthma. high triglyceride, HTN, low back pain. Seen today for admission to house calls program.  Interval Events: -Patient is being seen for post hospitalization and Rehab discharge for R flank pain, face pain, toe wound. -Outpatient PT - Patients have an appointment with Pain management for chronic pain. Dr Crabtree next appt 24 Patient denies any c/o patient is feeling better, pain meds are working. --183 -Patient have the Flu vaccine at PCp office on 2024 -Patient stated PCP start her on ABX for Bacterial Lung infection Amox.  Medical Issues:  Asthma- on Nebulizer, Inhaler  HTN- On Lisinopril high Triglyceride- ON Atorvastatin Bipolar disorder- Not on meds at this time.  DM- On Insulin -183 Chronic Pain, Lyrica, Oxycontin and oxycodone.  Subjective: 1. Appetite/Weight: Class III obesity with BMI ~42, appetite fair, weight loss 20 lbs, 330ls 3/4 2. Gait/Falls: Steady gait, Cane dependent. No falls 3. Sleep: fair 4. BMs: No concerns, take, meds 5. Urine: No concerns since lara discontinue 6. Skin: No rash or ulcers. 7. Mood/Memory: Notes recent small memory lapses, mood fair not depressed, [employer] support system. 8. Hospitalization: comma [facility] to 2022 to Nursing Home d/c 2024  to  [facility] DME: Cane, Oxygen concentrator, Nebulizer, Glucometer w/c, rollator, shower chair, raised toilet sit.  Social: Lives with daughter Caregiver: Daughter is CDPAP AIDE 30 hours / week MOLST: Full Code   -PCP Anirudh Maldonado, all blood work done by her. and pain meds prescribed and manage by him   ADVANCE CARE PLANNIN. Total Time Spent: 20 min 2. Participants: Myself, patient, SW 3. Discussion: Goals of Care, Advanced Directives, Health Care Agency, and Disease trajectory 4. Disease Trajectory: Discussed and reviewed that patient's health is currently stable, and the prognosis moving forward is unclear. 5. Prognosis, Based On Clinician Best Judgment: Indeterminate 6. Goals of Care: 1) Extend life, by hospitalization and aggressive measures if needed, 2) Avoid discomfort, 3) Manage medical problems at home where safely possible. 7. HCA: Friend Tony primary, cousin Shashank secondary. 8. MOLST Completed: CPR yes, intubation long term, do hospitalize, no limitation on medical interventions, trial feeding tube, trial IVF, use antibiotics, Dialysis. 9. Hospice Benefit discussion deferred at this time.

## 2024-09-26 NOTE — REASON FOR VISIT
[Post-hospitalization from ___ Hospital] : Post-hospitalization from [unfilled] Hospital [Admitted on: ___] : The patient was admitted on [unfilled] [Discharged on ___] : discharged on [unfilled] [Discharge Summary] : discharge summary [Pertinent Labs] : pertinent labs [Discharge Med List] : discharge medication list [Med Reconciliation] : medication reconciliation has been completed [Patient Contacted By: ____] : and contacted by [unfilled] [Intercurrent Specialty/Sub-specialty Visits] : The patient has intercurrent specialty/sub-specialty visits [Pre-Visit Preparation] : Pre-visit preparation was done [FreeTextEntry2] : Hospital Course: Discharge Date 03-Jun-2024  Admission Date 29-May-2024 02:13 Reason for Admission R flank pain, face pain, toe wound. Hospital Course  53 y/o F with pmh of DM type 2, HTN, intercostal neuralgia, complex regional pain syndrome with spinal cord stimulator p/w pain in R flank, face, and R toe wound. For complex regional pain syndrome, patient was give oral oxycodone, IV toradol, tylenol and pain management was consulted. Patient provided a list of outpatient pain management providers. For facial phlegmonous cellulitis, patient was started on IV Unasyn, seen by plastic surgery, attempted drainage but no pus. Patient was transition to oral antibiotics on discharge to complete 2 weeks. Patient may also benefit from additional services at home due to multiple medications and comorbidities. On 6/3/2024 patient medically cleared for discharge home by Dr. Perez Patient would benefit from increased home health aide hours. Med Reconciliation: Override IMPROVE-DD recommendations due to: IMPROVE-DD Application Not Available Recommended Post-Discharge VTE Prophylaxis IMPROVE-DD Application Not Available Medication Reconciliation Status Admission Reconciliation is Completed Discharge Reconciliation is Completed Discharge Medications albuterol 90 mcg/inh inhalation aerosol: 2 puff(s) inhaled every 6 hours as needed for Bronchospasm amoxicillin-clavulanate 875 mg-125 mg oral tablet: 1 tab(s) orally 2 times a day atorvastatin 20 mg oral tablet: 1 tab(s) orally once a day (at bedtime) bumetanide 2 mg oral tablet: 1 tab(s) orally once a day carvedilol 3.125 mg oral tablet: 1 tab(s) orally every 12 hours cyclobenzaprine 10 mg oral tablet: 1 tab(s) orally 3 times a day as needed for spasm MDD: 3 Lidocare Pain Relief Patch 4% topical film: Apply topically to affected area once a day apply to lower back, right flank and mid back lisinopril 5 mg oral tablet: 1 tab(s) orally once a day metFORMIN 1000 mg oral tablet: 1 tab(s) orally 2 times a day montelukast 10 mg oral tablet: 1 tab(s) orally once a day Movantik 25 mg oral tablet: 1 tab(s) orally once a day NovoLOG FlexPen 100 units/mL injectable solution: 12 unit(s) injectable 3 times a day (before meals) oxyCODONE 20 mg oral tablet: 1 tab(s) orally 3 times a day as needed for severe pain MDD: 3 tab pantoprazole 40 mg oral delayed release tablet: 1 tab(s) orally once a day (before a meal) pregabalin 100 mg oral capsule: 1 cap(s) orally 3 times a day tirzepatide 2.5 mg/0.5 mL subcutaneous solution: 2.5 milligram(s) subcutaneously Tresiba 100 units/mL subcutaneous solution: 54 international unit(s) subcutaneous once a day (at bedtime) Care Plan/Procedures: Discharge Diagnoses, Assessment and Plan of Treatment PRINCIPAL DISCHARGE DIAGNOSIS Diagnosis: Facial infection Assessment and Plan of Treatment: You received IV antibiotics, and then transitioned to oral antibiotics to complete 2 weeks at home. SECONDARY DISCHARGE DIAGNOSES Diagnosis: Complex regional pain syndrome I Assessment and Plan of Treatment: You received your home oxycodone, tylenol, toradol. A list of pain specialists was provided to you for outpatient follow up. Your methocarbamol requires a prior authorization (flexeril is the preferred drug). We submitted for authorization.  If this is obtained [facility] will call you. If not continue to take flexeril as ordered (DO NOT TAKE BOTH FLEXERIL AND METHOCARBAMOL AT THE SAME TIME).If your prior authorization is denied your primary care provider will need to reattempt authorization. Discharge Procedures, Findings and Treatment PRINCIPAL PROCEDURE Procedure: CT maxillofacial area Findings and Treatment: IMPRESSION: Similar-appearing soft tissue stranding in the premalar subcutaneous fat right cheek which extends into the buccinator fat with resolved air collection centrally.  nflamed region without drainable fluid collection. Findings favor phlegmonous changes, if there is clinical signs for infection. SECONDARY PROCEDURE Procedure: CT lumbar spine Findings and Treatment: IMPRESSION: 1. No vertebral fracture is recognized. 2. Mild lumbar degenerative disc disease 3. Sacroiliac osteoarthritis [FreeTextEntry4] : Chart reviewed [FreeTextEntry5] : PCP, Pain Management

## 2024-09-26 NOTE — HISTORY OF PRESENT ILLNESS
[Patient] : patient [FreeTextEntry1] [facility] [[facility]] : COVID SCREEN: Patient or caretaker denies fever, cough, trouble breathing, rash, vomiting. Patient has not been in close contact with anyone who is COVID-19 positive, or suspected of having COVID-19. N95 mask, gloves, eye wear and gown (if indicated) used during visit: Yes.  HPI: 52yo F with PMHX of bipolar II disorder, depression, class III obesity (41 kg/m2), IBS-D, T2DM, GERD, asthma. high triglyceride, HTN, low back pain. Seen today for admission to house calls program.  Interval Events: -Patient is being seen for post hospitalization and Rehab discharge for R flank pain, face pain, toe wound. -Outpatient PT - Patients have an appointment with Pain management for chronic pain. Dr Crabtree next appt 24 Patient denies any c/o patient is feeling better, pain meds are working. --183 -Patient have the Flu vaccine at PCp office on 2024 -Patient stated PCP start her on ABX for Bacterial Lung infection Amox.  Medical Issues:  Asthma- on Nebulizer, Inhaler  HTN- On Lisinopril high Triglyceride- ON Atorvastatin Bipolar disorder- Not on meds at this time.  DM- On Insulin -183 Chronic Pain, Lyrica, Oxycontin and oxycodone.  Subjective: 1. Appetite/Weight: Class III obesity with BMI ~42, appetite fair, weight loss 20 lbs, 330ls 3/4 2. Gait/Falls: Steady gait, Cane dependent. No falls 3. Sleep: fair 4. BMs: No concerns, take, meds 5. Urine: No concerns since lara discontinue 6. Skin: No rash or ulcers. 7. Mood/Memory: Notes recent small memory lapses, mood fair not depressed, [employer] support system. 8. Hospitalization: comma 22 to 2022 to Nursing Home d/c 2024  to  [facility] DME: Cane, Oxygen concentrator, Nebulizer, Glucometer w/c, rollator, shower chair, raised toilet sit.  Social: Lives with daughter Caregiver: Daughter is CDPAP AIDE 30 hours / week MOLST: Full Code   -PCP Anirudh Maldonado, all blood work done by her. and pain meds prescribed and manage by him   ADVANCE CARE PLANNIN. Total Time Spent: 20 min 2. Participants: Myself, patient, SW 3. Discussion: Goals of Care, Advanced Directives, Health Care Agency, and Disease trajectory 4. Disease Trajectory: Discussed and reviewed that patient's health is currently stable, and the prognosis moving forward is unclear. 5. Prognosis, Based On Clinician Best Judgment: Indeterminate 6. Goals of Care: 1) Extend life, by hospitalization and aggressive measures if needed, 2) Avoid discomfort, 3) Manage medical problems at home where safely possible. 7. HCA: Friend Tony primary, cousin Shashank secondary. 8. MOLST Completed: CPR yes, intubation long term, do hospitalize, no limitation on medical interventions, trial feeding tube, trial IVF, use antibiotics, Dialysis. 9. Hospice Benefit discussion deferred at this time.

## 2024-09-26 NOTE — PAST MEDICAL HISTORY
Onset: Ongoing     Location / description: Patient states that her whole top of her head is in pain. No other symptoms. Went over care advice with patient she verbalized understanding. Will call back if symptoms worsen or persist.     Precipitating Factors: unknown     Pain Scale (1 - 10), 10 highest: 3-5/10    Associated Symptoms: see above     What improves/worsens symptoms: nothing     Symptom specific medications: Percocet     LMP : No LMP recorded. Patient is not currently having periods (Reason: Intrauterine Device).     Are you pregnant or breast feeding: no    Recent Care: 02/13/18      Reason for Disposition  • Similar to previously diagnosed migraine headaches (all triage questions negative)    Protocols used: HEADACHE-A-AH       Detail Level: Zone Otc Regimen: Zyrtec bid [PMH Reviewed and Updated] : past medical history reviewed and updated

## 2024-09-26 NOTE — HISTORY OF PRESENT ILLNESS
[Patient] : patient [FreeTextEntry1] [facility] [FreeTextEntry2] : COVID SCREEN: Patient or caretaker denies fever, cough, trouble breathing, rash, vomiting. Patient has not been in close contact with anyone who is COVID-19 positive, or suspected of having COVID-19. N95 mask, gloves, eye wear and gown (if indicated) used during visit: Yes.  HPI: 52yo F with PMHX of bipolar II disorder, depression, class III obesity (41 kg/m2), IBS-D, T2DM, GERD, asthma. high triglyceride, HTN, low back pain. Seen today for admission to house calls program.  Interval Events: -Patient is being seen for post hospitalization and Rehab discharge for R flank pain, face pain, toe wound. -Outpatient PT - Patients have an appointment with Pain management for chronic pain. Dr Crabtree next appt 24 Patient denies any c/o patient is feeling better, pain meds are working. --183 -Patient have the Flu vaccine at PCp office on 2024 -Patient stated PCP start her on ABX for Bacterial Lung infection Amox.  Medical Issues:  Asthma- on Nebulizer, Inhaler  HTN- On Lisinopril high Triglyceride- ON Atorvastatin Bipolar disorder- Not on meds at this time.  DM- On Insulin -183 Chronic Pain, Lyrica, Oxycontin and oxycodone.  Subjective: 1. Appetite/Weight: Class III obesity with BMI ~42, appetite fair, weight loss 20 lbs, 330ls 3/4 2. Gait/Falls: Steady gait, Cane dependent. No falls 3. Sleep: fair 4. BMs: No concerns, take, meds 5. Urine: No concerns since lara discontinue 6. Skin: No rash or ulcers. 7. Mood/Memory: Notes recent small memory lapses, mood fair not depressed, [employer] support system. 8. Hospitalization: comma 22 to 2022 to Nursing Home d/c 2024  to  [facility] DME: Cane, Oxygen concentrator, Nebulizer, Glucometer w/c, rollator, shower chair, raised toilet sit.  Social: Lives with daughter Caregiver: Daughter is CDPAP AIDE 30 hours / week MOLST: Full Code   -PCP Anirudh Maldonado, all blood work done by her. and pain meds prescribed and manage by him   ADVANCE CARE PLANNIN. Total Time Spent: 20 min 2. Participants: Myself, patient, SW 3. Discussion: Goals of Care, Advanced Directives, Health Care Agency, and Disease trajectory 4. Disease Trajectory: Discussed and reviewed that patient's health is currently stable, and the prognosis moving forward is unclear. 5. Prognosis, Based On Clinician Best Judgment: Indeterminate 6. Goals of Care: 1) Extend life, by hospitalization and aggressive measures if needed, 2) Avoid discomfort, 3) Manage medical problems at home where safely possible. 7. HCA: Friend Tony primary, cousin Shashank secondary. 8. MOLST Completed: CPR yes, intubation long term, do hospitalize, no limitation on medical interventions, trial feeding tube, trial IVF, use antibiotics, Dialysis. 9. Hospice Benefit discussion deferred at this time.

## 2024-09-26 NOTE — COUNSELING
[Obese (BMI >29.9)] : Obese - BMI >29.9 [DASH diet given] : DASH diet given [Non - Smoker] : non-smoker [Use assistive device to avoid falls] : use assistive device to avoid falls [Remove clutter and unsafe carpeting to avoid falls] : remove clutter and unsafe carpeting to avoid falls [Mammogram] : Mammogram [Improve mobility] : improve mobility [Improve weight] : improve weight [Improve pain control] : improve pain control [Decrease stress] : decrease stress [Decrease hospital use] : decrease hospital use [Minimize unnecessary interventions] : minimize unnecessary interventions [Completed Medical Orders for Life-Sustaining Treatment] : completed medical orders for life-sustaining treatment [Full Code] : Code Status: Full Code [No Limitations] : Treatment Guidelines: No limitations [Long Term Intubation] : Intubation: Long term intubation [[facility] Date: _____[facility]/last [facility] date: [unfilled] [_____] : HCP: [unfilled]

## 2024-10-23 ENCOUNTER — APPOINTMENT (OUTPATIENT)
Dept: HOME HEALTH SERVICES | Facility: HOME HEALTH | Age: 54
End: 2024-10-23
Payer: MEDICARE

## 2024-10-23 VITALS
TEMPERATURE: 97.88 F | HEART RATE: 74 BPM | RESPIRATION RATE: 16 BRPM | OXYGEN SATURATION: 99 % | DIASTOLIC BLOOD PRESSURE: 80 MMHG | SYSTOLIC BLOOD PRESSURE: 130 MMHG

## 2024-10-23 DIAGNOSIS — I27.20 PULMONARY HYPERTENSION, UNSPECIFIED: ICD-10-CM

## 2024-10-23 DIAGNOSIS — F31.81 BIPOLAR II DISORDER: ICD-10-CM

## 2024-10-23 DIAGNOSIS — F41.9 ANXIETY DISORDER, UNSPECIFIED: ICD-10-CM

## 2024-10-23 DIAGNOSIS — I50.32 CHRONIC DIASTOLIC (CONGESTIVE) HEART FAILURE: ICD-10-CM

## 2024-10-23 DIAGNOSIS — E11.49 TYPE 2 DIABETES MELLITUS WITH OTHER DIABETIC NEUROLOGICAL COMPLICATION: ICD-10-CM

## 2024-10-23 PROCEDURE — 99349 HOME/RES VST EST MOD MDM 40: CPT

## 2024-10-28 NOTE — DATA REVIEWED
Goal Outcome Evaluation:  Plan of Care Reviewed With: patient        Progress: improving  Outcome Evaluation: Patient confused at times, with some hallucinations noted and frequently getting out of bed without assistanct, but is pleasant and cooperative with explained care and redirection. Reported pain to left sided, relived with ordered Tylenol. Increased restlessness and confusion noted with increased pain. VSS.                              [FreeTextEntry1] : Prior labs and imaging reviewed, with no new or distinct findings at this time.\par

## 2024-10-29 PROBLEM — F41.9 ANXIETY: Status: ACTIVE | Noted: 2024-10-29

## 2024-10-29 RX ORDER — ALPRAZOLAM 0.25 MG/1
0.25 TABLET ORAL
Qty: 4 | Refills: 0 | Status: ACTIVE | COMMUNITY
Start: 2024-10-29

## 2024-11-08 NOTE — ASU PATIENT PROFILE, ADULT - URINARY CATHETER
No,     Thank you for allowing me to participate in your healthcare. You are the reason we are here, and I hope that we provided you with the excellent service you deserve.     Take Care,  RUSTY Monge    
no

## 2024-11-11 ENCOUNTER — OUTPATIENT (OUTPATIENT)
Dept: OUTPATIENT SERVICES | Facility: HOSPITAL | Age: 54
LOS: 1 days | End: 2024-11-11
Payer: MEDICARE

## 2024-11-11 ENCOUNTER — TRANSCRIPTION ENCOUNTER (OUTPATIENT)
Age: 54
End: 2024-11-11

## 2024-11-11 VITALS
OXYGEN SATURATION: 98 % | SYSTOLIC BLOOD PRESSURE: 122 MMHG | DIASTOLIC BLOOD PRESSURE: 83 MMHG | HEART RATE: 58 BPM | HEIGHT: 68 IN | WEIGHT: 293 LBS | RESPIRATION RATE: 18 BRPM | TEMPERATURE: 97 F

## 2024-11-11 VITALS
OXYGEN SATURATION: 95 % | HEART RATE: 58 BPM | RESPIRATION RATE: 16 BRPM | DIASTOLIC BLOOD PRESSURE: 67 MMHG | SYSTOLIC BLOOD PRESSURE: 156 MMHG | TEMPERATURE: 97 F

## 2024-11-11 DIAGNOSIS — Z98.890 OTHER SPECIFIED POSTPROCEDURAL STATES: Chronic | ICD-10-CM

## 2024-11-11 DIAGNOSIS — Z90.49 ACQUIRED ABSENCE OF OTHER SPECIFIED PARTS OF DIGESTIVE TRACT: Chronic | ICD-10-CM

## 2024-11-11 DIAGNOSIS — Z96.82 PRESENCE OF NEUROSTIMULATOR: Chronic | ICD-10-CM

## 2024-11-11 PROCEDURE — 76000 FLUOROSCOPY <1 HR PHYS/QHP: CPT

## 2024-11-11 PROCEDURE — 62350 IMPLANT SPINAL CANAL CATH: CPT

## 2024-11-11 PROCEDURE — C1889: CPT

## 2024-11-11 DEVICE — CATH EPIDURAL FETH R KATH: Type: IMPLANTABLE DEVICE | Site: BILATERAL | Status: FUNCTIONAL

## 2024-11-11 RX ORDER — INSULIN DEGLUDEC 100 U/ML
0 INJECTION, SOLUTION SUBCUTANEOUS
Refills: 0 | DISCHARGE

## 2024-11-11 RX ORDER — HYDROMORPHONE HCL/0.9% NACL/PF 6 MG/30 ML
1 PATIENT CONTROLLED ANALGESIA SYRINGE INTRAVENOUS
Refills: 0 | DISCHARGE

## 2024-11-11 RX ORDER — HYDROMORPHONE HCL/0.9% NACL/PF 6 MG/30 ML
50 PATIENT CONTROLLED ANALGESIA SYRINGE INTRAVENOUS ONCE
Refills: 0 | Status: COMPLETED | OUTPATIENT
Start: 2024-11-11 | End: 2024-11-11

## 2024-11-11 RX ORDER — HYDROMORPHONE HCL/0.9% NACL/PF 6 MG/30 ML
1 PATIENT CONTROLLED ANALGESIA SYRINGE INTRAVENOUS ONCE
Refills: 0 | Status: DISCONTINUED | OUTPATIENT
Start: 2024-11-11 | End: 2024-11-11

## 2024-11-11 NOTE — ASU DISCHARGE PLAN (ADULT/PEDIATRIC) - FINANCIAL ASSISTANCE
[facility] provides services at a reduced cost to those who are determined to be eligible through [facility]’s financial assistance program. Information regarding [facility]’s financial assistance program can be found by going to [email or web address] or by calling [phone].

## 2024-11-11 NOTE — ASU DISCHARGE PLAN (ADULT/PEDIATRIC) - PLEASE INDICATE TEMPERATURE IN FAHRENHEIT OR CELSIUS
1-year-old male with no significant past medical history presents with fever for 2 days.  Mother has not been taking the temperature at home but has been giving 5 mL of Tylenol as needed.  Also been picking at ears.  Denies any cough, congestion, difficulty breathing, vomiting, abdominal pain.  Tolerating p.o. and good amount of wet diapers.  Patient attends .  [employer].  Immunizations up-to-date.
101

## 2024-11-11 NOTE — ASU DISCHARGE PLAN (ADULT/PEDIATRIC) - NS MD DC FALL RISK RISK
For information on Fall & Injury Prevention, visit: [email or web address] OR  [email or web address] OR  https://www.cdc.gov/steadi/patient.html

## 2024-11-11 NOTE — PRE-ANESTHESIA EVALUATION ADULT - NSANTHADDINFOFT_GEN_ALL_CORE
MAC / possible GA  Patient states she has tolerated spinal cord stimulator trial under sedation. Patient states that she believes she can tolerate procedure today under MAC. Patient understands that she will not be completely asleep during this procedure.   R/B/A d/w patient. All questions answered.

## 2024-11-22 VITALS
TEMPERATURE: 98 F | WEIGHT: 293 LBS | RESPIRATION RATE: 16 BRPM | OXYGEN SATURATION: 100 % | DIASTOLIC BLOOD PRESSURE: 68 MMHG | HEIGHT: 68 IN | HEART RATE: 61 BPM | SYSTOLIC BLOOD PRESSURE: 159 MMHG

## 2024-11-22 NOTE — ASU PATIENT PROFILE, ADULT - NSICDXPASTMEDICALHX_GEN_ALL_CORE_FT
PAST MEDICAL HISTORY:  Asthma     Complex regional pain syndrome type 1     Diabetes     DM2 (diabetes mellitus, type 2)     Essential hypertension     Fibroids     Former cigarette smoker (smoked x 45 years; quit ~2013)    History of CHF (congestive heart failure)     HTN (hypertension)     IBS (irritable bowel syndrome)     Idiopathic intracranial hypertension     Intercostal neuralgia     Localized swelling, mass and lump, head     Marijuana smoker, continuous (states smokes 3 - 4 times per day for pain management reasons)    Neuralgia (pt states hx/o "intercostal neuralgia")    Neuropathy     Obesity      PAST MEDICAL HISTORY:  Asthma     Complex regional pain syndrome type 1     Diabetes     Fibroids     Former cigarette smoker (smoked x 45 years; quit ~2014)    History of CHF (congestive heart failure)     HTN (hypertension)     IBS (irritable bowel syndrome)     Idiopathic intracranial hypertension     Intercostal neuralgia     Localized swelling, mass and lump, head     Marijuana smoker, continuous (states smokes 3 - 4 times per day for pain management reasons)    Migraine     Neuropathy     Obesity

## 2024-11-22 NOTE — PRE-ANESTHESIA EVALUATION ADULT - NSANTHAIRWAYFT_ENT_ALL_CORE
pts family came in to  paper work, pts daughter stated pts current med list was not up to date, I informed her that I would get med list updated for her. I informed her that I would print out updated med list. //BJ     Neck: full range of motion  Unrestricted mouth opening  Thyromental distance more than 3 cm

## 2024-11-22 NOTE — ASU PATIENT PROFILE, ADULT - NSICDXPASTSURGICALHX_GEN_ALL_CORE_FT
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

## 2024-11-22 NOTE — ASU PREOP CHECKLIST - INTERNAL PROSTHESES
Glucose monitor left arm , spinal cord stimulator implant (battery on right upper buttock ) , temporary PCA  pump/yes(specify)

## 2024-11-23 PROBLEM — M79.2 NEURALGIA AND NEURITIS, UNSPECIFIED: Chronic | Status: INACTIVE | Noted: 2020-09-17 | Resolved: 2024-11-22

## 2024-11-23 PROBLEM — I10 ESSENTIAL (PRIMARY) HYPERTENSION: Chronic | Status: INACTIVE | Noted: 2020-10-10 | Resolved: 2024-11-22

## 2024-11-23 PROBLEM — E11.9 TYPE 2 DIABETES MELLITUS WITHOUT COMPLICATIONS: Chronic | Status: INACTIVE | Noted: 2024-04-11 | Resolved: 2024-11-22

## 2024-11-25 ENCOUNTER — OUTPATIENT (OUTPATIENT)
Dept: OUTPATIENT SERVICES | Facility: HOSPITAL | Age: 54
LOS: 1 days | Discharge: HOME | End: 2024-11-25
Payer: MEDICARE

## 2024-11-25 ENCOUNTER — TRANSCRIPTION ENCOUNTER (OUTPATIENT)
Age: 54
End: 2024-11-25

## 2024-11-25 VITALS
DIASTOLIC BLOOD PRESSURE: 58 MMHG | HEART RATE: 70 BPM | OXYGEN SATURATION: 99 % | SYSTOLIC BLOOD PRESSURE: 118 MMHG | RESPIRATION RATE: 16 BRPM

## 2024-11-25 DIAGNOSIS — Z96.82 PRESENCE OF NEUROSTIMULATOR: Chronic | ICD-10-CM

## 2024-11-25 DIAGNOSIS — Z90.49 ACQUIRED ABSENCE OF OTHER SPECIFIED PARTS OF DIGESTIVE TRACT: Chronic | ICD-10-CM

## 2024-11-25 DIAGNOSIS — Z98.890 OTHER SPECIFIED POSTPROCEDURAL STATES: Chronic | ICD-10-CM

## 2024-11-25 PROCEDURE — 62350 IMPLANT SPINAL CANAL CATH: CPT

## 2024-11-25 PROCEDURE — C1772: CPT

## 2024-11-25 PROCEDURE — 82962 GLUCOSE BLOOD TEST: CPT

## 2024-11-25 PROCEDURE — C1755: CPT

## 2024-11-25 PROCEDURE — 76000 FLUOROSCOPY <1 HR PHYS/QHP: CPT

## 2024-11-25 PROCEDURE — C1889: CPT

## 2024-11-25 PROCEDURE — 62362 IMPLANT SPINE INFUSION PUMP: CPT

## 2024-11-25 DEVICE — PUMP INFUSION SYNCHROMED II 40ML: Type: IMPLANTABLE DEVICE | Status: FUNCTIONAL

## 2024-11-25 DEVICE — KIT CATH ASCENDA ACCESSORY: Type: IMPLANTABLE DEVICE | Status: FUNCTIONAL

## 2024-11-25 DEVICE — CATH ASCENDA INTRATHECAL SHORT: Type: IMPLANTABLE DEVICE | Status: FUNCTIONAL

## 2024-11-25 RX ORDER — HYDROMORPHONE HYDROCHLORIDE 2 MG/1
0.5 TABLET ORAL
Refills: 0 | Status: DISCONTINUED | OUTPATIENT
Start: 2024-11-25 | End: 2024-11-26

## 2024-11-25 RX ORDER — HYDROMORPHONE HYDROCHLORIDE 2 MG/1
1 TABLET ORAL
Refills: 0 | DISCHARGE

## 2024-11-25 RX ORDER — DIPHENHYDRAMINE HCL 25 MG
1 CAPSULE ORAL
Refills: 0 | DISCHARGE

## 2024-11-25 RX ORDER — METOCLOPRAMIDE HYDROCHLORIDE 10 MG/1
1 TABLET ORAL
Refills: 0 | DISCHARGE

## 2024-11-25 RX ORDER — BACLOFEN 10 MG
1 TABLET ORAL
Refills: 0 | DISCHARGE

## 2024-11-25 RX ORDER — TIRZEPATIDE 2.5 MG/.5ML
2.5 INJECTION, SOLUTION SUBCUTANEOUS
Refills: 0 | DISCHARGE

## 2024-11-25 RX ORDER — NALOXEGOL OXALATE 12.5 MG/1
1 TABLET, FILM COATED ORAL
Refills: 0 | DISCHARGE

## 2024-11-25 RX ORDER — LISINOPRIL 20 MG/1
1 TABLET ORAL
Refills: 0 | DISCHARGE

## 2024-11-25 RX ORDER — LIDOCAINE HYDROCHLORIDE 40 MG/ML
1 SOLUTION TOPICAL
Refills: 0 | DISCHARGE

## 2024-11-25 RX ORDER — ALBUTEROL 90 MCG
2 AEROSOL (GRAM) INHALATION
Refills: 0 | DISCHARGE

## 2024-11-25 RX ORDER — ALPRAZOLAM 0.25 MG
1 TABLET ORAL
Refills: 0 | DISCHARGE

## 2024-11-25 RX ORDER — KETOROLAC TROMETHAMINE 30 MG/ML
1 INJECTION INTRAMUSCULAR; INTRAVENOUS
Refills: 0 | DISCHARGE

## 2024-11-25 RX ORDER — ACETAZOLAMIDE 250 MG/1
1 TABLET ORAL
Refills: 0 | DISCHARGE

## 2024-11-25 RX ORDER — ACETAMINOPHEN, DIPHENHYDRAMINE HCL, PHENYLEPHRINE HCL 325; 25; 5 MG/1; MG/1; MG/1
6 TABLET ORAL
Refills: 0 | DISCHARGE

## 2024-11-25 RX ORDER — ONDANSETRON HYDROCHLORIDE 4 MG/1
4 TABLET, FILM COATED ORAL ONCE
Refills: 0 | Status: DISCONTINUED | OUTPATIENT
Start: 2024-11-25 | End: 2024-11-26

## 2024-11-25 RX ADMIN — HYDROMORPHONE HYDROCHLORIDE 0.5 MILLIGRAM(S): 2 TABLET ORAL at 20:27

## 2024-11-25 RX ADMIN — HYDROMORPHONE HYDROCHLORIDE 0.5 MILLIGRAM(S): 2 TABLET ORAL at 19:22

## 2024-11-25 RX ADMIN — HYDROMORPHONE HYDROCHLORIDE 0.5 MILLIGRAM(S): 2 TABLET ORAL at 20:12

## 2024-11-25 RX ADMIN — HYDROMORPHONE HYDROCHLORIDE 0.5 MILLIGRAM(S): 2 TABLET ORAL at 19:37

## 2024-11-25 RX ADMIN — HYDROMORPHONE HYDROCHLORIDE 0.5 MILLIGRAM(S): 2 TABLET ORAL at 21:20

## 2024-11-25 RX ADMIN — HYDROMORPHONE HYDROCHLORIDE 0.5 MILLIGRAM(S): 2 TABLET ORAL at 21:35

## 2024-11-25 NOTE — ASU DISCHARGE PLAN (ADULT/PEDIATRIC) - FINANCIAL ASSISTANCE
[facility] provides services at a reduced cost to those who are determined to be eligible through [facility]’s financial assistance program. Information regarding [facility]’s financial assistance program can be found by going to [email or web address] or by calling [phone].

## 2024-11-26 ENCOUNTER — TRANSCRIPTION ENCOUNTER (OUTPATIENT)
Age: 54
End: 2024-11-26

## 2024-11-26 ENCOUNTER — EMERGENCY (EMERGENCY)
Facility: HOSPITAL | Age: 54
LOS: 1 days | Discharge: HOME | End: 2024-11-26
Attending: EMERGENCY MEDICINE | Admitting: EMERGENCY MEDICINE
Payer: MEDICARE

## 2024-11-26 VITALS
SYSTOLIC BLOOD PRESSURE: 112 MMHG | RESPIRATION RATE: 18 BRPM | OXYGEN SATURATION: 95 % | HEIGHT: 68 IN | HEART RATE: 61 BPM | TEMPERATURE: 98 F | DIASTOLIC BLOOD PRESSURE: 71 MMHG | WEIGHT: 293 LBS

## 2024-11-26 VITALS
OXYGEN SATURATION: 96 % | SYSTOLIC BLOOD PRESSURE: 170 MMHG | DIASTOLIC BLOOD PRESSURE: 71 MMHG | HEART RATE: 80 BPM | TEMPERATURE: 98 F | RESPIRATION RATE: 20 BRPM

## 2024-11-26 DIAGNOSIS — Z96.82 PRESENCE OF NEUROSTIMULATOR: Chronic | ICD-10-CM

## 2024-11-26 DIAGNOSIS — Z98.890 OTHER SPECIFIED POSTPROCEDURAL STATES: Chronic | ICD-10-CM

## 2024-11-26 DIAGNOSIS — Z90.49 ACQUIRED ABSENCE OF OTHER SPECIFIED PARTS OF DIGESTIVE TRACT: Chronic | ICD-10-CM

## 2024-11-26 PROBLEM — G43.909 MIGRAINE, UNSPECIFIED, NOT INTRACTABLE, WITHOUT STATUS MIGRAINOSUS: Chronic | Status: ACTIVE | Noted: 2024-11-22

## 2024-11-26 LAB
ALBUMIN SERPL ELPH-MCNC: 3.7 G/DL — SIGNIFICANT CHANGE UP (ref 3.3–5)
ALP SERPL-CCNC: 126 U/L — HIGH (ref 40–120)
ALT FLD-CCNC: 15 U/L — SIGNIFICANT CHANGE UP (ref 10–45)
ANION GAP SERPL CALC-SCNC: 12 MMOL/L — SIGNIFICANT CHANGE UP (ref 5–17)
AST SERPL-CCNC: 21 U/L — SIGNIFICANT CHANGE UP (ref 10–40)
BASOPHILS # BLD AUTO: 0.03 K/UL — SIGNIFICANT CHANGE UP (ref 0–0.2)
BASOPHILS NFR BLD AUTO: 0.2 % — SIGNIFICANT CHANGE UP (ref 0–2)
BILIRUB DIRECT SERPL-MCNC: 0.1 MG/DL — SIGNIFICANT CHANGE UP (ref 0–0.3)
BILIRUB INDIRECT FLD-MCNC: 0.4 MG/DL — SIGNIFICANT CHANGE UP (ref 0.2–1)
BILIRUB SERPL-MCNC: 0.5 MG/DL — SIGNIFICANT CHANGE UP (ref 0.2–1.2)
BUN SERPL-MCNC: 20 MG/DL — SIGNIFICANT CHANGE UP (ref 7–23)
CALCIUM SERPL-MCNC: 8.7 MG/DL — SIGNIFICANT CHANGE UP (ref 8.4–10.5)
CHLORIDE SERPL-SCNC: 98 MMOL/L — SIGNIFICANT CHANGE UP (ref 96–108)
CO2 SERPL-SCNC: 24 MMOL/L — SIGNIFICANT CHANGE UP (ref 22–31)
CREAT SERPL-MCNC: 0.88 MG/DL — SIGNIFICANT CHANGE UP (ref 0.5–1.3)
EGFR: 78 ML/MIN/1.73M2 — SIGNIFICANT CHANGE UP
EOSINOPHIL # BLD AUTO: 0.04 K/UL — SIGNIFICANT CHANGE UP (ref 0–0.5)
EOSINOPHIL NFR BLD AUTO: 0.3 % — SIGNIFICANT CHANGE UP (ref 0–6)
GLUCOSE SERPL-MCNC: 212 MG/DL — HIGH (ref 70–99)
HCT VFR BLD CALC: 36 % — SIGNIFICANT CHANGE UP (ref 34.5–45)
HGB BLD-MCNC: 11.7 G/DL — SIGNIFICANT CHANGE UP (ref 11.5–15.5)
IMM GRANULOCYTES NFR BLD AUTO: 0.6 % — SIGNIFICANT CHANGE UP (ref 0–0.9)
LIDOCAIN IGE QN: 17 U/L — SIGNIFICANT CHANGE UP (ref 7–60)
LYMPHOCYTES # BLD AUTO: 1.85 K/UL — SIGNIFICANT CHANGE UP (ref 1–3.3)
LYMPHOCYTES # BLD AUTO: 13.1 % — SIGNIFICANT CHANGE UP (ref 13–44)
MCHC RBC-ENTMCNC: 29.6 PG — SIGNIFICANT CHANGE UP (ref 27–34)
MCHC RBC-ENTMCNC: 32.5 G/DL — SIGNIFICANT CHANGE UP (ref 32–36)
MCV RBC AUTO: 91.1 FL — SIGNIFICANT CHANGE UP (ref 80–100)
MONOCYTES # BLD AUTO: 0.67 K/UL — SIGNIFICANT CHANGE UP (ref 0–0.9)
MONOCYTES NFR BLD AUTO: 4.7 % — SIGNIFICANT CHANGE UP (ref 2–14)
NEUTROPHILS # BLD AUTO: 11.48 K/UL — HIGH (ref 1.8–7.4)
NEUTROPHILS NFR BLD AUTO: 81.1 % — HIGH (ref 43–77)
NRBC # BLD: 0 /100 WBCS — SIGNIFICANT CHANGE UP (ref 0–0)
PLATELET # BLD AUTO: 431 K/UL — HIGH (ref 150–400)
POTASSIUM SERPL-MCNC: 4.8 MMOL/L — SIGNIFICANT CHANGE UP (ref 3.5–5.3)
POTASSIUM SERPL-SCNC: 4.8 MMOL/L — SIGNIFICANT CHANGE UP (ref 3.5–5.3)
PROT SERPL-MCNC: 8.2 G/DL — SIGNIFICANT CHANGE UP (ref 6–8.3)
RBC # BLD: 3.95 M/UL — SIGNIFICANT CHANGE UP (ref 3.8–5.2)
RBC # FLD: 12.9 % — SIGNIFICANT CHANGE UP (ref 10.3–14.5)
SODIUM SERPL-SCNC: 134 MMOL/L — LOW (ref 135–145)
WBC # BLD: 14.16 K/UL — HIGH (ref 3.8–10.5)
WBC # FLD AUTO: 14.16 K/UL — HIGH (ref 3.8–10.5)

## 2024-11-26 PROCEDURE — 99284 EMERGENCY DEPT VISIT MOD MDM: CPT | Mod: 25

## 2024-11-26 PROCEDURE — 80076 HEPATIC FUNCTION PANEL: CPT

## 2024-11-26 PROCEDURE — 36415 COLL VENOUS BLD VENIPUNCTURE: CPT

## 2024-11-26 PROCEDURE — 83690 ASSAY OF LIPASE: CPT

## 2024-11-26 PROCEDURE — 96375 TX/PRO/DX INJ NEW DRUG ADDON: CPT

## 2024-11-26 PROCEDURE — 85025 COMPLETE CBC W/AUTO DIFF WBC: CPT

## 2024-11-26 PROCEDURE — 80048 BASIC METABOLIC PNL TOTAL CA: CPT

## 2024-11-26 PROCEDURE — 99284 EMERGENCY DEPT VISIT MOD MDM: CPT | Mod: FS

## 2024-11-26 PROCEDURE — 96376 TX/PRO/DX INJ SAME DRUG ADON: CPT

## 2024-11-26 PROCEDURE — 96374 THER/PROPH/DIAG INJ IV PUSH: CPT

## 2024-11-26 RX ORDER — ACETAMINOPHEN 500 MG
1000 TABLET ORAL ONCE
Refills: 0 | Status: COMPLETED | OUTPATIENT
Start: 2024-11-26 | End: 2024-11-26

## 2024-11-26 RX ORDER — ONDANSETRON HYDROCHLORIDE 2 MG/ML
4 INJECTION, SOLUTION INTRAMUSCULAR; INTRAVENOUS ONCE
Refills: 0 | Status: COMPLETED | OUTPATIENT
Start: 2024-11-26 | End: 2024-11-26

## 2024-11-26 RX ORDER — KETOROLAC TROMETHAMINE 30 MG/ML
15 INJECTION INTRAMUSCULAR; INTRAVENOUS ONCE
Refills: 0 | Status: DISCONTINUED | OUTPATIENT
Start: 2024-11-26 | End: 2024-11-26

## 2024-11-26 RX ORDER — SODIUM CHLORIDE 9 MG/ML
1000 INJECTION, SOLUTION INTRAMUSCULAR; INTRAVENOUS; SUBCUTANEOUS ONCE
Refills: 0 | Status: COMPLETED | OUTPATIENT
Start: 2024-11-26 | End: 2024-11-26

## 2024-11-26 RX ADMIN — ONDANSETRON HYDROCHLORIDE 4 MILLIGRAM(S): 2 INJECTION, SOLUTION INTRAMUSCULAR; INTRAVENOUS at 11:47

## 2024-11-26 RX ADMIN — KETOROLAC TROMETHAMINE 15 MILLIGRAM(S): 30 INJECTION INTRAMUSCULAR; INTRAVENOUS at 16:33

## 2024-11-26 RX ADMIN — SODIUM CHLORIDE 1000 MILLILITER(S): 9 INJECTION, SOLUTION INTRAMUSCULAR; INTRAVENOUS; SUBCUTANEOUS at 10:18

## 2024-11-26 RX ADMIN — Medication 400 MILLIGRAM(S): at 10:18

## 2024-11-26 RX ADMIN — ONDANSETRON HYDROCHLORIDE 4 MILLIGRAM(S): 2 INJECTION, SOLUTION INTRAMUSCULAR; INTRAVENOUS at 10:15

## 2024-11-26 NOTE — ED PROVIDER NOTE - OTHER FREE TEXT FOR MDM DISCUSSED CASE WITH QUESTION
dr bowen - arranged for pain pump to be adjusted (decreased) and will see pt for f/u in office as needed

## 2024-11-26 NOTE — ED ADULT TRIAGE NOTE - BP NONINVASIVE SYSTOLIC (MM HG)
04/10/24 0919   Maternal Assessment   Breast Density Bilateral:;soft   Areola Bilateral:;elastic   Nipples Bilateral:;everted;short   Maternal Infant Feeding   Maternal Emotional State relaxed;assist needed   Infant Positioning clutch/football   Signs of Milk Transfer audible swallow;infant jaw motion present   Pain with Feeding no   Comfort Measures Before/During Feeding latch adjusted   Latch Assistance yes  (only on left side)     Patient latches infant on the right side without difficulty but struggles to keep infant latched on the left side.  Discussed nipple shapes and size difference from right to left and offered suggestions for effective latch.  Patient latched infant to the left breast without difficulty and infant is nursing well.  Offered support as needed. Encouraged skin to skin contact and encouraged fluids.  Verbalized understanding.    What Type Of Note Output Would You Prefer (Optional)?: Bullet Format How Severe Are Your Spot(S)?: moderate Have Your Spot(S) Been Treated In The Past?: has not been treated Hpi Title: Evaluation of Skin Lesions 112

## 2024-11-26 NOTE — ED PROVIDER NOTE - OBJECTIVE STATEMENT
The pt is a 53 y/o F, BIBA, c/o n/v since last night. Pt had pain pump placed yest, states that symptoms started after she got home and ate (chicken, corn on the vail), emesis non bloody, non bilious, unable to keep anything down, feels dehydrated. PMH DM, HTN, intercostal neuralgia, complex regional pain syndrome (spinal cord stimulator in place). Also c/o pain to pain pump site. Denies cp, sob, abd pain, fevers, chills, dizziness, syncope.

## 2024-11-26 NOTE — ED PROVIDER NOTE - NSFOLLOWUPINSTRUCTIONS_ED_ALL_ED_FT
Nausea and Vomiting, Adult  Nausea is feeling that you have an upset stomach and that you are about to vomit. Vomiting is when food in your stomach forcefully comes out of your mouth. Vomiting can make you feel weak. If you vomit, or if you are not able to drink enough fluids, you may not have enough water in your body (get dehydrated). If you do not have enough water in your body, you may:  Feel tired.  Feel thirsty.  Have a dry mouth.  Have cracked lips.  Pee (urinate) less often.  Older adults and people with other diseases or a weak body defense system (immune system) are at higher risk for not having enough water in the body. If you feel like you may vomit or you vomit, it is important to follow instructions from your doctor about how to take care of yourself.  Follow these instructions at home:  Watch your symptoms for any changes. Tell your doctor about them.  Eating and drinking  A bottle of clear fruit juice and glass of water.  A sign showing that a person should not drink alcohol.  Take an ORS (oral rehydration solution). This is a drink that is sold at pharmacies and stores.  Drink clear fluids in small amounts as you are able, such as:  Water.  Ice chips.  Fruit juice that has water added (diluted fruit juice).  Low-calorie sports drinks.  Eat bland, easy-to-digest foods in small amounts as you are able, such as:  Bananas.  Applesauce.  Rice.  Low-fat (lean) meats.  Toast.  Crackers.  Avoid drinking fluids that have a lot of sugar or caffeine in them. This includes energy drinks, sports drinks, and soda.  Avoid alcohol.  Avoid spicy or fatty foods.  General instructions  Take over-the-counter and prescription medicines only as told by your doctor.  Drink enough fluid to keep your pee (urine) pale yellow.  Wash your hands often with soap and water for at least 20 seconds. If you cannot use soap and water, use hand .  Make sure that everyone in your home washes their hands well and often.  Rest at home until you feel better.  Watch your condition for any changes.  Take slow and deep breaths when you feel like you may vomit.  Keep all follow-up visits.  Contact a doctor if:  Your symptoms get worse.  You have new symptoms.  You have a fever.  You cannot drink fluids without vomiting.  You feel like you may vomit for more than 2 days.  You feel light-headed or dizzy.  You have a headache.  You have muscle cramps.  You have a rash.  You have pain while peeing.  Get help right away if:  You have pain in your chest, neck, arm, or jaw.  You feel very weak or you faint.  You vomit again and again.  You have vomit that is bright red or looks like black coffee grounds.  You have bloody or black poop (stools) or poop that looks like tar.  You have a very bad headache, a stiff neck, or both.  You have very bad pain, cramping, or bloating in your belly (abdomen).  You have trouble breathing.  You are breathing very quickly.  Your heart is beating very quickly.  Your skin feels cold and clammy.  You feel confused.  You have signs of losing too much water in your body, such as:  Dark pee, very little pee, or no pee.  Cracked lips.  Dry mouth.  Sunken eyes.  Sleepiness.  Weakness.  These symptoms may be an emergency. Get help right away. Call 911.  Do not wait to see if the symptoms will go away.  Do not drive yourself to the hospital.  Summary  Nausea is feeling that you have an upset stomach and that you are about to vomit. Vomiting is when food in your stomach comes out of your mouth.  Follow instructions from your doctor about eating and drinking.  Take over-the-counter and prescription medicines only as told by your doctor.  Contact your doctor if your symptoms get worse or you have new symptoms.  Keep all follow-up visits.

## 2024-11-26 NOTE — ED ADULT NURSE NOTE - NSFALLHARMRISKINTERV_ED_ALL_ED

## 2024-11-26 NOTE — ED PROVIDER NOTE - ATTENDING APP SHARED VISIT CONTRIBUTION OF CARE
54 F hx complex regional pain syndrome s/p pain pump yesterday now s/p nausea and vomiting.  Pt on hydromorphone 0.25mg/hr?  Pain management contacted and believed too high dose and rec decrease to 0.1mg/hr?  Belly soft nontender.  No ab pain.  No headache.  Labs noted.  Given IVF and antiemetics and improved.  Tolerating PO.

## 2024-11-26 NOTE — ED PROVIDER NOTE - PATIENT PORTAL LINK FT
You can access the FollowMyHealth Patient Portal offered by [facility] by registering at the following website: [email or web address]. By joining [facility]’s FollowMyHealth portal, you will also be able to view your health information using other applications (apps) compatible with our system.

## 2024-11-26 NOTE — ED ADULT NURSE NOTE - SUICIDE SCREENING QUESTION 2
Left detailed message for pt regarding replies from [facility] and device showing normal sensing and pacing function.   Current ECG shows ASVS-sinus at ~85 bpm.     And that ERICKA will await the results of her Cardiac MRI.            No

## 2024-11-26 NOTE — ED ADULT NURSE NOTE - OBJECTIVE STATEMENT
54yF BIBA with the c/c of nausea, vomiting and back pain. PMH of s/p pain meds pump placement done yesterday for back pain. Patient verbalized unable to walk. Denies any fever, CP,SOB,Dizziness.

## 2024-11-26 NOTE — ED ADULT NURSE NOTE - NSSUHOSCREENINGYN_ED_ALL_ED
Bilateral UE grossly >/= 3/5, bilateral LE grossly >/= 3+/5/grossly assessed due to No - the patient is unable to be screened due to medical condition

## 2024-11-26 NOTE — ED ADULT TRIAGE NOTE - CHIEF COMPLAINT QUOTE
Pt presents to ED here for worsening nausea and vomiting after pain pump installation, pt on Dilaudid for intercostal neuralgia and R flank pain. Pt A&Ox4, NAD.

## 2024-11-26 NOTE — ED PROVIDER NOTE - CLINICAL SUMMARY MEDICAL DECISION MAKING FREE TEXT BOX
pt c/o n/v since last night, had pain pump implanted - states that symptoms started after she got home and ate dinner, c/o feeling dehydrated, also c/o local pain to pump insertion site, well appearing, non toxic, labs wnl. given ivf and antiemetics, symptomatically improved, tolerated po challenge, pain pump dose adjusted by pain management, stable for dc

## 2024-11-26 NOTE — ED PROVIDER NOTE - TOBACCO USE
June 28, 2023    [address]       Dear parent of [facility],             [address] [place] [address] records indicate she is past due for a well check   Please call the office to schedule an appointment or let us know if she has a new doctor     If you have any questions or concerns, please don't hesitate to call      Sincerely,             [facility] [place]         CC: No Recipients
Unknown if ever smoked

## 2024-11-27 ENCOUNTER — APPOINTMENT (OUTPATIENT)
Dept: HOME HEALTH SERVICES | Facility: HOME HEALTH | Age: 54
End: 2024-11-27

## 2024-11-28 DIAGNOSIS — Z88.8 ALLERGY STATUS TO OTHER DRUGS, MEDICAMENTS AND BIOLOGICAL SUBSTANCES: ICD-10-CM

## 2024-11-28 DIAGNOSIS — G90.50 COMPLEX REGIONAL PAIN SYNDROME I, UNSPECIFIED: ICD-10-CM

## 2024-11-28 DIAGNOSIS — G58.0 INTERCOSTAL NEUROPATHY: ICD-10-CM

## 2024-11-28 DIAGNOSIS — E11.9 TYPE 2 DIABETES MELLITUS WITHOUT COMPLICATIONS: ICD-10-CM

## 2024-11-28 DIAGNOSIS — R11.2 NAUSEA WITH VOMITING, UNSPECIFIED: ICD-10-CM

## 2024-11-28 DIAGNOSIS — I10 ESSENTIAL (PRIMARY) HYPERTENSION: ICD-10-CM

## 2024-11-28 DIAGNOSIS — E86.0 DEHYDRATION: ICD-10-CM

## 2024-11-29 NOTE — ED ADULT NURSE NOTE - FINAL NURSING ELECTRONIC SIGNATURE
26-Nov-2024 17:05

## 2024-12-03 NOTE — PROGRESS NOTE ADULT - PROBLEM SELECTOR PLAN 5
chest wall non-tender, breathing is unlabored without accessory muscle use, normal breath sounds resolved

## 2024-12-10 ENCOUNTER — APPOINTMENT (OUTPATIENT)
Dept: HOME HEALTH SERVICES | Facility: HOME HEALTH | Age: 54
End: 2024-12-10

## 2024-12-20 ENCOUNTER — TRANSCRIPTION ENCOUNTER (OUTPATIENT)
Age: 54
End: 2024-12-20

## 2024-12-20 ENCOUNTER — EMERGENCY (EMERGENCY)
Facility: HOSPITAL | Age: 54
LOS: 1 days | Discharge: HOME | End: 2024-12-20
Attending: STUDENT IN AN ORGANIZED HEALTH CARE EDUCATION/TRAINING PROGRAM
Payer: MEDICARE

## 2024-12-20 ENCOUNTER — APPOINTMENT (OUTPATIENT)
Dept: AFTER HOURS CARE | Facility: EMERGENCY ROOM | Age: 54
End: 2024-12-20
Payer: MEDICARE

## 2024-12-20 VITALS
SYSTOLIC BLOOD PRESSURE: 113 MMHG | HEIGHT: 68 IN | HEART RATE: 62 BPM | TEMPERATURE: 98 F | DIASTOLIC BLOOD PRESSURE: 70 MMHG | RESPIRATION RATE: 24 BRPM | OXYGEN SATURATION: 96 %

## 2024-12-20 DIAGNOSIS — Z90.49 ACQUIRED ABSENCE OF OTHER SPECIFIED PARTS OF DIGESTIVE TRACT: Chronic | ICD-10-CM

## 2024-12-20 DIAGNOSIS — Z96.82 PRESENCE OF NEUROSTIMULATOR: Chronic | ICD-10-CM

## 2024-12-20 DIAGNOSIS — Z98.890 OTHER SPECIFIED POSTPROCEDURAL STATES: Chronic | ICD-10-CM

## 2024-12-20 LAB
ALBUMIN SERPL ELPH-MCNC: 3.3 G/DL — LOW (ref 3.5–5)
ALP SERPL-CCNC: 137 U/L — HIGH (ref 40–120)
ALT FLD-CCNC: 23 U/L DA — SIGNIFICANT CHANGE UP (ref 10–60)
ANION GAP SERPL CALC-SCNC: 7 MMOL/L — SIGNIFICANT CHANGE UP (ref 5–17)
AST SERPL-CCNC: 19 U/L — SIGNIFICANT CHANGE UP (ref 10–40)
BASOPHILS # BLD AUTO: 0.03 K/UL — SIGNIFICANT CHANGE UP (ref 0–0.2)
BASOPHILS NFR BLD AUTO: 0.2 % — SIGNIFICANT CHANGE UP (ref 0–2)
BILIRUB SERPL-MCNC: 0.4 MG/DL — SIGNIFICANT CHANGE UP (ref 0.2–1.2)
BUN SERPL-MCNC: 19 MG/DL — HIGH (ref 7–18)
CALCIUM SERPL-MCNC: 8.3 MG/DL — LOW (ref 8.4–10.5)
CHLORIDE SERPL-SCNC: 103 MMOL/L — SIGNIFICANT CHANGE UP (ref 96–108)
CO2 SERPL-SCNC: 26 MMOL/L — SIGNIFICANT CHANGE UP (ref 22–31)
CREAT SERPL-MCNC: 1.05 MG/DL — SIGNIFICANT CHANGE UP (ref 0.5–1.3)
EGFR: 63 ML/MIN/1.73M2 — SIGNIFICANT CHANGE UP
EOSINOPHIL # BLD AUTO: 0.15 K/UL — SIGNIFICANT CHANGE UP (ref 0–0.5)
EOSINOPHIL NFR BLD AUTO: 1.1 % — SIGNIFICANT CHANGE UP (ref 0–6)
GLUCOSE SERPL-MCNC: 126 MG/DL — HIGH (ref 70–99)
HCT VFR BLD CALC: 35.9 % — SIGNIFICANT CHANGE UP (ref 34.5–45)
HGB BLD-MCNC: 12.1 G/DL — SIGNIFICANT CHANGE UP (ref 11.5–15.5)
IMM GRANULOCYTES NFR BLD AUTO: 0.4 % — SIGNIFICANT CHANGE UP (ref 0–0.9)
LIDOCAIN IGE QN: 51 U/L — SIGNIFICANT CHANGE UP (ref 13–75)
LYMPHOCYTES # BLD AUTO: 28.4 % — SIGNIFICANT CHANGE UP (ref 13–44)
LYMPHOCYTES # BLD AUTO: 3.95 K/UL — HIGH (ref 1–3.3)
MCHC RBC-ENTMCNC: 30.2 PG — SIGNIFICANT CHANGE UP (ref 27–34)
MCHC RBC-ENTMCNC: 33.7 G/DL — SIGNIFICANT CHANGE UP (ref 32–36)
MCV RBC AUTO: 89.5 FL — SIGNIFICANT CHANGE UP (ref 80–100)
MONOCYTES # BLD AUTO: 0.67 K/UL — SIGNIFICANT CHANGE UP (ref 0–0.9)
MONOCYTES NFR BLD AUTO: 4.8 % — SIGNIFICANT CHANGE UP (ref 2–14)
NEUTROPHILS # BLD AUTO: 9.05 K/UL — HIGH (ref 1.8–7.4)
NEUTROPHILS NFR BLD AUTO: 65.1 % — SIGNIFICANT CHANGE UP (ref 43–77)
NRBC # BLD: 0 /100 WBCS — SIGNIFICANT CHANGE UP (ref 0–0)
PLATELET # BLD AUTO: 455 K/UL — HIGH (ref 150–400)
POTASSIUM SERPL-MCNC: 3.7 MMOL/L — SIGNIFICANT CHANGE UP (ref 3.5–5.3)
POTASSIUM SERPL-SCNC: 3.7 MMOL/L — SIGNIFICANT CHANGE UP (ref 3.5–5.3)
PROT SERPL-MCNC: 8.2 G/DL — SIGNIFICANT CHANGE UP (ref 6–8.3)
RBC # BLD: 4.01 M/UL — SIGNIFICANT CHANGE UP (ref 3.8–5.2)
RBC # FLD: 12.8 % — SIGNIFICANT CHANGE UP (ref 10.3–14.5)
SODIUM SERPL-SCNC: 136 MMOL/L — SIGNIFICANT CHANGE UP (ref 135–145)
TROPONIN I, HIGH SENSITIVITY RESULT: 11.7 NG/L — SIGNIFICANT CHANGE UP
WBC # BLD: 13.9 K/UL — HIGH (ref 3.8–10.5)
WBC # FLD AUTO: 13.9 K/UL — HIGH (ref 3.8–10.5)

## 2024-12-20 PROCEDURE — 99215 OFFICE O/P EST HI 40 MIN: CPT

## 2024-12-20 PROCEDURE — 71045 X-RAY EXAM CHEST 1 VIEW: CPT | Mod: 26

## 2024-12-20 PROCEDURE — 99285 EMERGENCY DEPT VISIT HI MDM: CPT

## 2024-12-20 RX ORDER — HYDROMORPHONE HYDROCHLORIDE 2 MG/1
2 TABLET ORAL ONCE
Refills: 0 | Status: DISCONTINUED | OUTPATIENT
Start: 2024-12-20 | End: 2024-12-20

## 2024-12-20 RX ORDER — HYDROMORPHONE HYDROCHLORIDE 2 MG/1
4 TABLET ORAL ONCE
Refills: 0 | Status: DISCONTINUED | OUTPATIENT
Start: 2024-12-20 | End: 2024-12-20

## 2024-12-20 RX ORDER — ONDANSETRON HYDROCHLORIDE 4 MG/1
4 TABLET, FILM COATED ORAL ONCE
Refills: 0 | Status: COMPLETED | OUTPATIENT
Start: 2024-12-20 | End: 2024-12-20

## 2024-12-20 RX ORDER — MAGNESIUM, ALUMINUM HYDROXIDE 200-225/5
30 SUSPENSION, ORAL (FINAL DOSE FORM) ORAL ONCE
Refills: 0 | Status: COMPLETED | OUTPATIENT
Start: 2024-12-20 | End: 2024-12-20

## 2024-12-20 RX ORDER — FAMOTIDINE 20 MG/1
20 TABLET, FILM COATED ORAL ONCE
Refills: 0 | Status: DISCONTINUED | OUTPATIENT
Start: 2024-12-20 | End: 2024-12-20

## 2024-12-20 RX ORDER — FAMOTIDINE 20 MG/1
20 TABLET, FILM COATED ORAL ONCE
Refills: 0 | Status: COMPLETED | OUTPATIENT
Start: 2024-12-20 | End: 2024-12-20

## 2024-12-20 RX ORDER — ONDANSETRON HYDROCHLORIDE 4 MG/1
4 TABLET, FILM COATED ORAL ONCE
Refills: 0 | Status: DISCONTINUED | OUTPATIENT
Start: 2024-12-20 | End: 2024-12-20

## 2024-12-20 RX ADMIN — Medication 30 MILLILITER(S): at 21:24

## 2024-12-20 RX ADMIN — HYDROMORPHONE HYDROCHLORIDE 4 MILLIGRAM(S): 2 TABLET ORAL at 22:23

## 2024-12-20 RX ADMIN — Medication 162 MILLIGRAM(S): at 21:24

## 2024-12-20 RX ADMIN — HYDROMORPHONE HYDROCHLORIDE 2 MILLIGRAM(S): 2 TABLET ORAL at 21:24

## 2024-12-20 NOTE — ED PROVIDER NOTE - CLINICAL SUMMARY MEDICAL DECISION MAKING FREE TEXT BOX
Patient with palpations likely due to exacerbation of chronic pain  Signed out pending re-assessent and lab lresults.

## 2024-12-20 NOTE — ED PROVIDER NOTE - NSFOLLOWUPINSTRUCTIONS_ED_ALL_ED_FT
You were seen in the emergency department for: palpitations, pain  Your results report is attached.   We recommend you follow up with: your primary care doctor.     Please return to the Emergency Department if you experience any of the following symptoms:   - Shortness of breath or trouble breathing  - Pressure, pain or tightness in the chest  - Face drooping, arm weakness or speech difficulty  - Persistence of severe vomiting  - Head injury or loss of consciousness  - Nonstop bleeding or an open wound    (1) Follow up with your primary care physician within the next 24-48 hours as discussed. In addition, we did not find evidence of a life threatening illness on your testing here today, but listed below are the specialists that will be necessary to see as an outpatient to continue the workup.  Please call the numbers listed below or [phone] to set up the necessary appointments.  (2) Take Tylenol (up to 1000mg or 1 g)  and/or Motrin (up to 600mg) up to every 6 hours as needed for pain.   (3) If you had an IV (intravenous) line placed, it was removed. Sometimes, after IV removal, that area can be tender for a few days; if it develops redness and swelling, those could be signs of infection; in which case, return to the Emergency Department for assessment.  (4) Please continue taking all of your home medications as directed.

## 2024-12-20 NOTE — ED PROVIDER NOTE - PATIENT PORTAL LINK FT
You can access the FollowMyHealth Patient Portal offered by [facility] by registering at the following website: [email or web address]. By joining [facility]’s FollowMyHealth portal, you will also be able to view your health information using other applications (apps) compatible with our system.

## 2024-12-20 NOTE — ED PROVIDER NOTE - OBJECTIVE STATEMENT
54-year-old female presents reporting chest pain and palpitations.  Patient reports past medical history of complex regional pain syndrome obesity and reports recently being switched from opiates to a baclofen pump which she states has helped significantly.  Reports today having palpitations persistently.  Reports sensation of palpitations exacerbated her chronic pain and she took Dilaudid at home without relief.    GENERAL APPEARANCE:  AAOx3, Obese.  HEENT:  NCAT. Moist mucous membranes. EOMI, clear conjunctiva, no scleral icterus, oropharynx clear.  NECK:  Supple without lymphadenopathy. No JVD.  No neck stiffness or restricted ROM.  HEART:  Normal heart rate and regular rhythm. No murmur.   LUNGS:  CTAB, moving air well. No crackles or wheezes are heard.  ABDOMEN:  Soft, nontender, non-distended. Negative Cerda. Negative McBurney. No rebound or guarding.  CHEST/BACK: Chest wall non-tender. No CVAT, or midline cervical/thoracic/lumbar tenderness to palpation. No obvious deformity of chest wall or back.  EXTREMITIES:  Without cyanosis, clubbing or edema. Pulse intact x 4. FROM x4. Compartments soft in all extremities.  NEUROLOGICAL:  Grossly non-focal. Alert and oriented, moving all 4 extremities. CN II-XII grossly intact. Observed to ambulate with normal gait.  SKIN:  Warm and dry without any rash.  PSYCH: Calm, cooperative. Normal mood and affect. Demonstrates proper insight and judgement.

## 2024-12-21 VITALS
TEMPERATURE: 98 F | RESPIRATION RATE: 16 BRPM | SYSTOLIC BLOOD PRESSURE: 108 MMHG | HEART RATE: 60 BPM | OXYGEN SATURATION: 96 % | DIASTOLIC BLOOD PRESSURE: 60 MMHG

## 2024-12-21 PROCEDURE — 84484 ASSAY OF TROPONIN QUANT: CPT

## 2024-12-21 PROCEDURE — 83690 ASSAY OF LIPASE: CPT

## 2024-12-21 PROCEDURE — 36415 COLL VENOUS BLD VENIPUNCTURE: CPT

## 2024-12-21 PROCEDURE — 99285 EMERGENCY DEPT VISIT HI MDM: CPT | Mod: 25

## 2024-12-21 PROCEDURE — 71045 X-RAY EXAM CHEST 1 VIEW: CPT

## 2024-12-21 PROCEDURE — 93005 ELECTROCARDIOGRAM TRACING: CPT

## 2024-12-21 PROCEDURE — 85025 COMPLETE CBC W/AUTO DIFF WBC: CPT

## 2024-12-21 PROCEDURE — 96375 TX/PRO/DX INJ NEW DRUG ADDON: CPT

## 2024-12-21 PROCEDURE — 96374 THER/PROPH/DIAG INJ IV PUSH: CPT

## 2024-12-21 PROCEDURE — 80053 COMPREHEN METABOLIC PANEL: CPT

## 2024-12-21 RX ORDER — HYDROMORPHONE HYDROCHLORIDE 2 MG/1
2 TABLET ORAL ONCE
Refills: 0 | Status: DISCONTINUED | OUTPATIENT
Start: 2024-12-21 | End: 2024-12-21

## 2024-12-21 RX ADMIN — HYDROMORPHONE HYDROCHLORIDE 2 MILLIGRAM(S): 2 TABLET ORAL at 05:43

## 2024-12-21 RX ADMIN — HYDROMORPHONE HYDROCHLORIDE 2 MILLIGRAM(S): 2 TABLET ORAL at 00:02

## 2024-12-21 RX ADMIN — ONDANSETRON HYDROCHLORIDE 4 MILLIGRAM(S): 4 TABLET, FILM COATED ORAL at 00:02

## 2024-12-21 RX ADMIN — FAMOTIDINE 20 MILLIGRAM(S): 20 TABLET, FILM COATED ORAL at 00:02

## 2024-12-21 NOTE — ED ADULT NURSE NOTE - NSFALLRISKINTERV_ED_ALL_ED

## 2024-12-23 NOTE — ED ADULT TRIAGE NOTE - NS ED NURSE BANDS TYPE
[facility]  - THORACIC SURGERY      Patient:  Lorraine Rudolph  YOB: 1940  Date of Visit: 24    Dear Dr. Dent,    Thank you for the opportunity to follow-up with Lorraine Rudolph. The patient was seen by me on 2024: Follow-up lung infiltrates. Attached you will find a copy of my note from the patient's visit.    If you have specific questions, please do not hesitate to call me.      Sincerely,          MD Gabriela Navarrete Axel W, MD  2024 11:32 AM  Signed  [facility] - Thoracic Surgery   Follow Up Note         2024 10:48 AM   Patient: Lorraine Rudolph   MRN: [id number]   : 1940     Vitals:  Blood pressure (!) 155/79, pulse (!) 103, temperature 97.5 °F (36.4 °C), temperature source Temporal, height 5' 1\" (1.549 m), weight 64.2 kg (141 lb 9.6 oz), SpO2 95%.    Reason for visit  Follow-up lung nodules         Referring Provider:  Patient Care Team:  Cristina Dent DO as PCP - General (Internal Medicine)  Alejandrina Ying MD (Internal Medicine - Pulmonary Disease)  Fortunato Allen MD (Cardiology)  Johana Baker MD (Hematology & Oncology)  Haylie Burt MD (Surgery - Surgical Oncology)  Rossy Larsen, RN as Ambulatory Care Manager  (Registered Nurse)    Allergies:  ALLERGIES:   Allergen Reactions   • Meloxicam Other (See Comments)     Unknown   • Pravastatin MYALGIA     Other reaction(s): MYALGIA   • Shellfish Allergy   (Food Or Med) GI UPSET and VOMITING   • Adhesive   (Environmental) RASH     Nylon tape   • No Name Available HIVES   • Penicillins HIVES   • Raloxifene Other (See Comments)     unknown   • Shrimp Extract Allergy Skin Test Other (See Comments)       Presenting Meds:  Current Outpatient Medications   Medication Sig Dispense Refill   • albuterol (VENTOLIN) (2.5 MG/3ML) 0.083% nebulizer solution Take 3 mLs by nebulization every 6 hours as needed for Wheezing. 375 mL 5    • hydroxychloroquine (PLAQUENIL) 200 MG tablet Take 200 mg by mouth.     • insulin NPH (NovoLIN N FlexPen) 100 UNIT/ML pen-injector Inject 20 units into the skin daily. Prime 2 units before each dose. 15 mL 3   • traZODone (DESYREL) 100 MG tablet Take 1 tablet by mouth nightly. (Patient taking differently: Take 100 mg by mouth as needed.) 90 tablet 3   • metFORMIN (GLUCOPHAGE) 500 MG tablet Take 1 pill twice daily for 1 week, then go up to maintenance of 2 pills twice daily 180 tablet 3   • Continuous Glucose Sensor (Dexcom G7 Sensor) Misc Change sensor every 10 days.  Please provide 30 day supply. 3 each 3   • Continuous Glucose  (Dexcom G7 ) Device For use with the Dexcom G7 Sensors if patient does not wish to use cell phone pradip as . 1 each 0   • famotidine (PEPCID) 20 MG tablet TAKE 1 TABLET BY MOUTH IN THE MORNING AND IN THE EVENING 60 tablet 1   • Insulin Pen Needle (Pen Needles) 32G X 4 MM Misc Use to injection insulin daily.  Remove needle cover(s) to expose needle before injecting. 100 each 3   • predniSONE (DELTASONE) 20 MG tablet Take 40 mg by mouth daily.     • blood glucose (OneTouch Ultra Test) test strip Test blood sugar 3 times daily. Diagnosis: Type 2 NIDDM E11.9 Meter: one touch ultra 2. She is on high dose prednisone and needs to check blood sugars more frequently . 100 strip 11   • pregabalin (LYRICA) 50 MG capsule Take 1 capsule in the morning and 1 capsule at bedtime 60 capsule 3   • fluticasone-umeclidin-vilanterol (TRELEGY ELLIPTA) 100-62.5-25 MCG/ACT inhaler Inhale 1 puff into the lungs daily. 1 each 11   • blood glucose meter Use as directed. 1 kit 0   • blood glucose lancets Test blood sugar 2 times daily. Diagnosis: Type 2 DM E11.9. 100 each 3   • folic acid (FOLATE) 1 MG tablet Take 1 mg by mouth.     • methotrexate (RHEUMATREX) 2.5 MG tablet Take 5 mg by mouth 1 day a week.     • rOPINIRole (REQUIP) 0.5 MG tablet TAKE 3 TABLETS BY MOUTH EVERY DAY AT DINNER AND 3  TABLETS AT BEDTIME 540 tablet 3   • bumetanide (BUMEX) 1 MG tablet Take 1 tablet (1mg) every other day alternating with 1.5 tablets (1.5mg) every other day. 135 tablet 3   • alendronate (FOSAMAX) 70 MG tablet TAKE 1 TABLET BY MOUTH EVERY 7 DAYS 12 tablet 3   • Multiple Vitamins-Minerals (PreserVision AREDS 2) Cap      • Acetaminophen 325 MG Cap Take 2 capsules by mouth every 8 hours as needed (mild pain). 80 capsule 0     No current facility-administered medications for this visit.       History of present illness:  Patient is a 84-year-old woman who has a 30-pack-year smoking history who quit smoking in 2017.  Patient was seen by me initially in October 2018 for lung cancer screening     At that time she had some small nodules which are not particularly worrisome but she did have nodules and therefore was recommended to undergo a one-year follow-up CAT scan.     In light of her age she was not a candidate for continued low-dose CAT scans however does continue to follow-up with me.  She has undergone yearly CAT scans since 2018      Past medical history is significant for COPD gastroesophageal reflux and a history of noninvasive right breast cancer     Patient has been undergoing evaluation at [facility] recently for right breast lesion mostly associated with some contraction of the right breast she has undergone multiple imaging studies and biopsies there is no evidence of malignancy noted    Patient underwent a follow-up CAT scan of the chest in November 2024 at which time she was found to have development of multiple nodular opacities to both lungs and this was suggestive of an inflammatory process however it was somewhat worrisome for malignancy.  In light of this significant change and after discussion with the patient it was recommended she consider navigational bronchoscopy or short-term follow-up    After discussion with pulmonary and patient it was decided to proceed with a short-term follow-up CAT scan    She is  seeing me today now 5 weeks after her last CAT scan      Current symptoms: Primarily associated with some shortness of breath with activity and intermittent cough.  She has no fevers or chills.  The cough is improved from previous in early December    She has a history of intermittent diarrhea    She has dry eye.  He has some right hip pain over the recent past    She has no cardiac symptoms    IMAGING: Follow-up chest CT was compared to the previous scan from November 2024 and shows marked improvement in the 2 index nodules    This clearly suggest that this was an inflammatory process this will require continued short-term follow-up      CT Chest without contrast 12/20/24     History: Bilateral lung nodules     Comparison: CT chest dated 11/11/2024     Technique:  Axial CT images of the chest were obtained without IV contrast.  Coronal  and sagittal reformatted images were available for review.  Adjustment of  the mA and/or kV was done according to the patient's size.      FINDINGS:     No pathologically enlarged thoracic lymph nodes are seen.     Heart size is normal. Moderate calcific coronary atherosclerosis. Great  vessels are normal in caliber.     Central airways are normal. Moderate emphysema. Biapical  pleural-parenchymal fibrosis. Right lower lobe nodule with associated  groundglass is improved. There is mild tree-in-bud opacity in the right  lower lobe posteriorly, similar to prior. Left upper lobe nodules are  improved. Reticular opacity in the left lung base may represent  atelectasis. No pleural effusion or pneumothorax.     Visualized portions of the upper abdomen are within normal limits.     No significant change in the appearance of the right breast, with  architectural distortion, calcification and skin thickening.     Limitation(s): Evaluation of the parenchymal organs and vasculature is  limited due to lack of intravenous contrast.         IMPRESSION:  Improving pulmonary nodules, suggestive of  improving  infectious/inflammatory process. Recommend 3-month follow-up to confirm  resolution.     Electronically Signed by: PRISCILA COTTO MD   Signed on: 12/20/2024 10:09 AM   Workstation ID: [id number]          Review of Systems:  Review of Systems   Constitutional:  Negative for activity change, appetite change, chills, fever and unexpected weight change.   HENT:  Positive for congestion and sinus pressure. Negative for trouble swallowing and voice change.    Eyes:  Negative for visual disturbance.        History of cataract surgery in the past dry eyes   Respiratory:  Positive for cough and shortness of breath. Negative for chest tightness and wheezing.         Increased cough and shortness of breath over the last month this is improved recently  she still is using her albuterol nebulizer once or twice a day   Cardiovascular:  Negative for chest pain, palpitations and leg swelling.   Gastrointestinal:  Positive for diarrhea. Negative for abdominal distention, abdominal pain, constipation, nausea and vomiting.        Intermittent diarrhea   Genitourinary:  Negative for difficulty urinating, dysuria, frequency and hematuria.   Musculoskeletal:  Positive for arthralgias. Negative for back pain, gait problem, joint swelling and myalgias.        Mild lower extremity discomfort with swelling    Right hip pain    Skin:  Negative for rash.        Skin is thinning   Neurological:  Negative for dizziness, tremors, syncope, weakness, light-headedness, numbness and headaches.   Hematological:  Negative for adenopathy. Does not bruise/bleed easily.   Psychiatric/Behavioral:  Negative for agitation and confusion.        History:    Past Medical History:   Diagnosis Date   • Asthma (CMD)    • Breast cancer  (CMD) 2000    surgery and RT, no chemo   • Chronic pain    • Cigarette nicotine dependence in remission 11/14/2021   • Congestive cardiac failure  (CMD)    • COPD (chronic obstructive pulmonary disease)  (CMD)      emphysema   • Hiatal hernia with GERD and esophagitis     EGD    • Hyperlipidemia    • Impaired fasting glucose    • Lung nodules    • Macular degeneration    • Macular degeneration    • Obstructive sleep apnea    • Osteoporosis    • Restless leg        Family History   Problem Relation Age of Onset   • COPD Mother    • Cancer, Lung Father    • Cancer Brother    • Diabetes Brother    • Diabetes Brother    • Diabetes Son    • Cancer Son         Ewings sarcoma/ NHL   • Neuropathy Son    • Cancer Maternal Aunt 55        Breast   • Postmenopausal breast cancer Maternal Aunt    • Cancer, Colon Neg Hx    • Cancer, Ovarian Neg Hx         Social History     Tobacco Use   • Smoking status: Former     Current packs/day: 0.00     Average packs/day: 1 pack/day for 40.0 years (40.0 ttl pk-yrs)     Types: Cigarettes     Start date: 1977     Quit date: 2017     Years since quittin.6     Passive exposure: Past   • Smokeless tobacco: Never   • Tobacco comments:     Pt Started smoking  Quit 2017 Smoked 1 PPD    Vaping Use   • Vaping status: never used   Substance Use Topics   • Alcohol use: Never     Comment: very rarely, social   • Drug use: Never        Past Surgical History:   Procedure Laterality Date   • Breast lumpectomy Right 2024    RIGHT BREAST WIRELESS LOCALIZED EXCISIONAL BIOPSY   • Breast surgery     • Cataract extraction, bilateral     • Colonoscopy diagnostic  2013    diverticulosis, hemorrhoids   • Colonoscopy w biopsy  2021    diverticulosis   • Esophagogastroduodenoscopy transoral flex w/bx single or mult  2021    hiatal hernia, mild Schatzki   • Lymphadenectomy     • Radiation therapy management     • Tendon repair Right     Right foot   • Total hip replacement Right           Physical Exam:  Physical Exam  Constitutional:       Appearance: She is well-developed.   HENT:      Head: Normocephalic and atraumatic.   Neck:      Thyroid: No thyromegaly.      Trachea: No  Name band; tracheal deviation.   Cardiovascular:      Rate and Rhythm: Normal rate and regular rhythm.      Heart sounds: Normal heart sounds. No murmur heard.     No friction rub.   Pulmonary:      Effort: Pulmonary effort is normal. No respiratory distress.      Breath sounds: Normal breath sounds. No wheezing or rales.   Chest:      Chest wall: No tenderness.   Abdominal:      General: There is no distension.      Tenderness: There is no abdominal tenderness.   Musculoskeletal:         General: No swelling, tenderness or deformity. Normal range of motion.   Lymphadenopathy:      Cervical: No cervical adenopathy.   Skin:     General: Skin is warm and dry.      Findings: No rash.   Neurological:      Mental Status: She is alert and oriented to person, place, and time.      Coordination: Coordination normal.         Assessment / Plan  Diagnoses and all orders for this visit:  Infiltrate of both lungs present on imaging study  -     Glucose 6 Phosphatase Dehydrogenase, Quantitative; Future  -     CT CHEST WO CONTRAST; Future     84-year-old woman with history as previously documented was found on follow-up CT scans to have suspicious bilateral nodular infiltrates    5-week follow-up CAT scan was compared to previous scan in November and shows marked improvement in these nodules    Clearly this was an inflammatory process    I reviewed the images with her in detail today    Current recommendation is follow-up with a repeat CT again in 3 months.     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 Ying  and it was recommended we consider using some type of pneumocystis prophylaxis.  I have sent a level for G6PD if  this is normal we will consider Dapsone patient apparently had problems in the past with sulfa drugs we were concerned about using back    Thank you for the opportunity to help care for her I will look forward to seeing her in 3 months.  Patient is being considered for IVIG treatment for the inflammatory process of her right breast.      Mark Ochoa MD     Main Office:      [facility]    [address]    [place] [address]      P: [phone]    F: [phone]    Thoracic Surgery     Mayte Vega MD, FACS, FRCS     MD Analia Meneses MD, MSN,      APRN, NP-C     PATRICE Tanner    Interventional Pulmonology       Karma Ngo MD, [facility]     Judah Fleming, FNP-C       Claudia Del Rosario RN    Nurse Navigator(s):       MARYJANE Keller, RN           Marya Burgos    Director        Rekha Terry    Practice Manager:       Hailee Bridges    [facility] Locations:       [facility]

## 2025-01-21 ENCOUNTER — APPOINTMENT (OUTPATIENT)
Dept: HOME HEALTH SERVICES | Facility: HOME HEALTH | Age: 55
End: 2025-01-21
Payer: MEDICARE

## 2025-01-21 VITALS
RESPIRATION RATE: 18 BRPM | HEART RATE: 78 BPM | OXYGEN SATURATION: 99 % | SYSTOLIC BLOOD PRESSURE: 128 MMHG | TEMPERATURE: 97.88 F | DIASTOLIC BLOOD PRESSURE: 70 MMHG

## 2025-01-21 DIAGNOSIS — E11.49 TYPE 2 DIABETES MELLITUS WITH OTHER DIABETIC NEUROLOGICAL COMPLICATION: ICD-10-CM

## 2025-01-21 DIAGNOSIS — G89.29 LOW BACK PAIN, UNSPECIFIED: ICD-10-CM

## 2025-01-21 DIAGNOSIS — I50.32 CHRONIC DIASTOLIC (CONGESTIVE) HEART FAILURE: ICD-10-CM

## 2025-01-21 DIAGNOSIS — M54.50 LOW BACK PAIN, UNSPECIFIED: ICD-10-CM

## 2025-01-21 DIAGNOSIS — F31.81 BIPOLAR II DISORDER: ICD-10-CM

## 2025-01-21 PROCEDURE — 99349 HOME/RES VST EST MOD MDM 40: CPT

## 2025-01-21 RX ORDER — HUMAN INSULIN 100 [IU]/ML
(70-30) 100 INJECTION, SUSPENSION SUBCUTANEOUS 3 TIMES DAILY
Refills: 0 | Status: ACTIVE | COMMUNITY
Start: 2025-01-21

## 2025-01-24 NOTE — ED ADULT TRIAGE NOTE - RESPIRATORY RATE (BREATHS/MIN)
24 Mart-1 - Positive Histology Text: MART-1 staining demonstrates areas of higher density and clustering of melanocytes with Pagetoid spread upwards within the epidermis. The surgical margins are positive for tumor cells.

## 2025-01-27 NOTE — DISCHARGE NOTE NURSING/CASE MANAGEMENT/SOCIAL WORK - NSTRANSFEREYEGLASSESPAIRS_GEN_A_NUR
Patient returned call and I gave her MD recommendations. Patient scheduled XR to be done this afternoon, so pending the results, patient may ask MD for something stronger.   1 pair

## 2025-01-27 NOTE — PRE-OP CHECKLIST - 1.
Spirometry    Performed by: Ashtyn Jones, RRT  Authorized by: Stephen Cope MD     Pre Drug % Predicted    FVC: 63%   FEV1: 56%   FEV1/FVC: 66%    Interpretation   Spirometry   Spirometry shows moderate obstruction.   Overall comments: The patient's spirometry is consistent with a moderate obstructive ventilatory defect.  When current studies are compared to studies from January 24, 2024, there has been a decline in both her FVC and FEV1 compared to previous.       Escort: Tony Garcia / [place]- [phone]

## 2025-01-29 NOTE — ASU PATIENT PROFILE, ADULT - AVIAN FLU SYMPTOMS
Patient Education        Premature Heartbeat: Care Instructions  Overview     A premature heartbeat happens when the heart beats earlier than it should. This briefly interrupts the heart's rhythm. You do not usually feel the early heartbeat, and the next beat is stronger. To many people, this feels like a skipped heartbeat or a flutter. This heartbeat is also called a premature ventricular contraction (PVC).  If you have no heart problems, premature heartbeats that happen once in a while are not a cause for concern. Most people have them at some time. They may happen more often if you use caffeine or alcohol or are under stress.  You may have tests to check for a cause of the premature heartbeats. Most people do not need treatment. But some people may take medicine or have a procedure called ablation to prevent these heartbeats and to relieve symptoms.  Follow-up care is a key part of your treatment and safety. Be sure to make and go to all appointments, and call your doctor if you are having problems. It's also a good idea to know your test results and keep a list of the medicines you take.  How can you care for yourself at home?  Limit caffeine and other stimulants if they trigger premature heartbeats.  Try to manage stress.  Do not smoke or allow others to smoke around you. If you need help quitting, talk to your doctor about stop-smoking programs and medicines. These can increase your chances of quitting for good.  Get at least 30 minutes of exercise on most days of the week. Walking is a good choice. You also may want to do other activities, such as running, swimming, cycling, or playing tennis or team sports.  Eat heart-healthy foods.  Stay at a healthy weight. Lose weight if you need to.  Limit alcohol to 2 drinks a day for men and 1 drink a day for women. Too much alcohol can cause health problems. If drinking alcohol causes more premature heartbeats, do not drink it.  If your doctor prescribes medicine,  No

## 2025-02-14 VITALS
WEIGHT: 293 LBS | HEART RATE: 64 BPM | HEIGHT: 68 IN | RESPIRATION RATE: 18 BRPM | TEMPERATURE: 97 F | OXYGEN SATURATION: 97 % | SYSTOLIC BLOOD PRESSURE: 159 MMHG | DIASTOLIC BLOOD PRESSURE: 63 MMHG

## 2025-02-14 NOTE — ASU PREOP CHECKLIST - SIDE RAILS UP
Caller would like to discuss an/a Appointment for abscess removal . Writer advised caller of callback within 24-72 hours.    Patient Name: Marbella Duncan  Caller Name: patient   Name of Facility:   Fax Number:   Callback Number: [phone]   Additional Info: caller is stating she did not get a call back, with the name of the doctor that will be removing the abscess.  Please advise.       Thank you,  Lisa Cummings     n/a

## 2025-02-14 NOTE — ASU PATIENT PROFILE, ADULT - NSICDXPASTSURGICALHX_GEN_ALL_CORE_FT
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

## 2025-02-14 NOTE — ASU PATIENT PROFILE, ADULT - NSICDXPASTMEDICALHX_GEN_ALL_CORE_FT
PAST MEDICAL HISTORY:  Asthma     Complex regional pain syndrome type 1     Diabetes     Fibroids     Former cigarette smoker (smoked x 45 years; quit ~2014)    History of CHF (congestive heart failure)     HTN (hypertension)     IBS (irritable bowel syndrome)     Idiopathic intracranial hypertension     Intercostal neuralgia     Localized swelling, mass and lump, head     Marijuana smoker, continuous (states smokes 3 - 4 times per day for pain management reasons)    Migraine     Neuropathy     Obesity

## 2025-02-17 ENCOUNTER — OUTPATIENT (OUTPATIENT)
Dept: OUTPATIENT SERVICES | Facility: HOSPITAL | Age: 55
LOS: 1 days | End: 2025-02-17
Payer: MEDICARE

## 2025-02-17 ENCOUNTER — TRANSCRIPTION ENCOUNTER (OUTPATIENT)
Age: 55
End: 2025-02-17

## 2025-02-17 VITALS — TEMPERATURE: 98 F

## 2025-02-17 DIAGNOSIS — Z90.49 ACQUIRED ABSENCE OF OTHER SPECIFIED PARTS OF DIGESTIVE TRACT: Chronic | ICD-10-CM

## 2025-02-17 DIAGNOSIS — Z98.890 OTHER SPECIFIED POSTPROCEDURAL STATES: Chronic | ICD-10-CM

## 2025-02-17 DIAGNOSIS — Z96.82 PRESENCE OF NEUROSTIMULATOR: Chronic | ICD-10-CM

## 2025-02-17 PROCEDURE — 61070 BRAIN CANAL SHUNT PROCEDURE: CPT

## 2025-02-17 PROCEDURE — C9399: CPT

## 2025-02-17 PROCEDURE — 76000 FLUOROSCOPY <1 HR PHYS/QHP: CPT

## 2025-02-17 PROCEDURE — 82962 GLUCOSE BLOOD TEST: CPT

## 2025-02-17 PROCEDURE — 87075 CULTR BACTERIA EXCEPT BLOOD: CPT

## 2025-02-17 PROCEDURE — 87070 CULTURE OTHR SPECIMN AEROBIC: CPT

## 2025-02-17 PROCEDURE — 62362 IMPLANT SPINE INFUSION PUMP: CPT

## 2025-02-17 PROCEDURE — 62350 IMPLANT SPINAL CANAL CATH: CPT

## 2025-02-17 PROCEDURE — 88300 SURGICAL PATH GROSS: CPT

## 2025-02-17 PROCEDURE — C1755: CPT

## 2025-02-17 PROCEDURE — 88300 SURGICAL PATH GROSS: CPT | Mod: 26

## 2025-02-17 DEVICE — CATH ASCENDA INTRATHECAL SHORT: Type: IMPLANTABLE DEVICE | Status: FUNCTIONAL

## 2025-02-17 DEVICE — PATIENT PERSONAL THERAPY MANAGER: Type: IMPLANTABLE DEVICE | Status: FUNCTIONAL

## 2025-02-17 RX ORDER — ONDANSETRON 4 MG/1
8 TABLET, ORALLY DISINTEGRATING ORAL ONCE
Refills: 0 | Status: COMPLETED | OUTPATIENT
Start: 2025-02-17 | End: 2025-02-17

## 2025-02-17 RX ORDER — HYDROMORPHONE HYDROCHLORIDE 4 MG/ML
1 INJECTION, SOLUTION INTRAMUSCULAR; INTRAVENOUS; SUBCUTANEOUS ONCE
Refills: 0 | Status: DISCONTINUED | OUTPATIENT
Start: 2025-02-17 | End: 2025-02-17

## 2025-02-17 RX ORDER — HYDROMORPHONE HYDROCHLORIDE 4 MG/ML
4 INJECTION, SOLUTION INTRAMUSCULAR; INTRAVENOUS; SUBCUTANEOUS ONCE
Refills: 0 | Status: DISCONTINUED | OUTPATIENT
Start: 2025-02-17 | End: 2025-02-17

## 2025-02-17 RX ORDER — ONDANSETRON 4 MG/1
8 TABLET, ORALLY DISINTEGRATING ORAL ONCE
Refills: 0 | Status: DISCONTINUED | OUTPATIENT
Start: 2025-02-17 | End: 2025-02-17

## 2025-02-17 RX ORDER — HYDROMORPHONE HYDROCHLORIDE 4 MG/ML
0.5 INJECTION, SOLUTION INTRAMUSCULAR; INTRAVENOUS; SUBCUTANEOUS ONCE
Refills: 0 | Status: DISCONTINUED | OUTPATIENT
Start: 2025-02-17 | End: 2025-02-17

## 2025-02-17 RX ADMIN — HYDROMORPHONE HYDROCHLORIDE 0.5 MILLIGRAM(S): 4 INJECTION, SOLUTION INTRAMUSCULAR; INTRAVENOUS; SUBCUTANEOUS at 18:49

## 2025-02-17 RX ADMIN — ONDANSETRON 8 MILLIGRAM(S): 4 TABLET, ORALLY DISINTEGRATING ORAL at 20:14

## 2025-02-17 RX ADMIN — HYDROMORPHONE HYDROCHLORIDE 0.5 MILLIGRAM(S): 4 INJECTION, SOLUTION INTRAMUSCULAR; INTRAVENOUS; SUBCUTANEOUS at 18:39

## 2025-02-17 NOTE — PRE-ANESTHESIA EVALUATION ADULT - NSANTHPEFT_GEN_ALL_CORE
well developed well nourished obese female in NAD  awake, alert, and oriented  +S1/S2  B/L air entry

## 2025-02-17 NOTE — ASU DISCHARGE PLAN (ADULT/PEDIATRIC) - FINANCIAL ASSISTANCE
[facility] provides services at a reduced cost to those who are determined to be eligible through [facility]’s financial assistance program. Information regarding [facility]’s financial assistance program can be found by going to [email or web address] or by calling [phone].

## 2025-03-02 ENCOUNTER — EMERGENCY (EMERGENCY)
Facility: HOSPITAL | Age: 55
LOS: 1 days | Discharge: HOME | End: 2025-03-02
Attending: EMERGENCY MEDICINE | Admitting: EMERGENCY MEDICINE
Payer: MEDICARE

## 2025-03-02 VITALS
HEIGHT: 68 IN | HEART RATE: 72 BPM | SYSTOLIC BLOOD PRESSURE: 113 MMHG | WEIGHT: 293 LBS | TEMPERATURE: 98 F | OXYGEN SATURATION: 97 % | DIASTOLIC BLOOD PRESSURE: 62 MMHG | RESPIRATION RATE: 18 BRPM

## 2025-03-02 DIAGNOSIS — Z98.890 OTHER SPECIFIED POSTPROCEDURAL STATES: Chronic | ICD-10-CM

## 2025-03-02 DIAGNOSIS — Z90.49 ACQUIRED ABSENCE OF OTHER SPECIFIED PARTS OF DIGESTIVE TRACT: Chronic | ICD-10-CM

## 2025-03-02 DIAGNOSIS — Z96.82 PRESENCE OF NEUROSTIMULATOR: Chronic | ICD-10-CM

## 2025-03-02 LAB
ALBUMIN SERPL ELPH-MCNC: 3.5 G/DL — SIGNIFICANT CHANGE UP (ref 3.3–5)
ALP SERPL-CCNC: 114 U/L — SIGNIFICANT CHANGE UP (ref 40–120)
ALT FLD-CCNC: 18 U/L — SIGNIFICANT CHANGE UP (ref 4–33)
ANION GAP SERPL CALC-SCNC: 18 MMOL/L — HIGH (ref 7–14)
APTT BLD: 31.4 SEC — SIGNIFICANT CHANGE UP (ref 24.5–35.6)
AST SERPL-CCNC: 20 U/L — SIGNIFICANT CHANGE UP (ref 4–32)
BASOPHILS # BLD AUTO: 0.03 K/UL — SIGNIFICANT CHANGE UP (ref 0–0.2)
BASOPHILS NFR BLD AUTO: 0.2 % — SIGNIFICANT CHANGE UP (ref 0–2)
BILIRUB SERPL-MCNC: 0.4 MG/DL — SIGNIFICANT CHANGE UP (ref 0.2–1.2)
BUN SERPL-MCNC: 16 MG/DL — SIGNIFICANT CHANGE UP (ref 7–23)
CALCIUM SERPL-MCNC: 9 MG/DL — SIGNIFICANT CHANGE UP (ref 8.4–10.5)
CHLORIDE SERPL-SCNC: 94 MMOL/L — LOW (ref 98–107)
CO2 SERPL-SCNC: 21 MMOL/L — LOW (ref 22–31)
CREAT SERPL-MCNC: 1.01 MG/DL — SIGNIFICANT CHANGE UP (ref 0.5–1.3)
EGFR: 66 ML/MIN/1.73M2 — SIGNIFICANT CHANGE UP
EOSINOPHIL # BLD AUTO: 0.19 K/UL — SIGNIFICANT CHANGE UP (ref 0–0.5)
EOSINOPHIL NFR BLD AUTO: 1.6 % — SIGNIFICANT CHANGE UP (ref 0–6)
GAS PNL BLDV: SIGNIFICANT CHANGE UP
GLUCOSE SERPL-MCNC: 185 MG/DL — HIGH (ref 70–99)
HCT VFR BLD CALC: 34.4 % — LOW (ref 34.5–45)
HGB BLD-MCNC: 11 G/DL — LOW (ref 11.5–15.5)
IANC: 6.9 K/UL — SIGNIFICANT CHANGE UP (ref 1.8–7.4)
IMM GRANULOCYTES NFR BLD AUTO: 0.5 % — SIGNIFICANT CHANGE UP (ref 0–0.9)
INR BLD: 0.92 RATIO — SIGNIFICANT CHANGE UP (ref 0.85–1.16)
LYMPHOCYTES # BLD AUTO: 34.1 % — SIGNIFICANT CHANGE UP (ref 13–44)
LYMPHOCYTES # BLD AUTO: 4.12 K/UL — HIGH (ref 1–3.3)
MCHC RBC-ENTMCNC: 29.2 PG — SIGNIFICANT CHANGE UP (ref 27–34)
MCHC RBC-ENTMCNC: 32 G/DL — SIGNIFICANT CHANGE UP (ref 32–36)
MCV RBC AUTO: 91.2 FL — SIGNIFICANT CHANGE UP (ref 80–100)
MONOCYTES # BLD AUTO: 0.78 K/UL — SIGNIFICANT CHANGE UP (ref 0–0.9)
MONOCYTES NFR BLD AUTO: 6.5 % — SIGNIFICANT CHANGE UP (ref 2–14)
NEUTROPHILS # BLD AUTO: 6.9 K/UL — SIGNIFICANT CHANGE UP (ref 1.8–7.4)
NEUTROPHILS NFR BLD AUTO: 57.1 % — SIGNIFICANT CHANGE UP (ref 43–77)
NRBC # BLD AUTO: 0 K/UL — SIGNIFICANT CHANGE UP (ref 0–0)
NRBC # FLD: 0 K/UL — SIGNIFICANT CHANGE UP (ref 0–0)
NRBC BLD AUTO-RTO: 0 /100 WBCS — SIGNIFICANT CHANGE UP (ref 0–0)
NT-PROBNP SERPL-SCNC: <36 PG/ML — SIGNIFICANT CHANGE UP
PHOSPHATE SERPL-MCNC: 4.4 MG/DL — SIGNIFICANT CHANGE UP (ref 2.5–4.5)
PLATELET # BLD AUTO: 423 K/UL — HIGH (ref 150–400)
POTASSIUM SERPL-MCNC: 4 MMOL/L — SIGNIFICANT CHANGE UP (ref 3.5–5.3)
POTASSIUM SERPL-SCNC: 4 MMOL/L — SIGNIFICANT CHANGE UP (ref 3.5–5.3)
PROT SERPL-MCNC: 7.4 G/DL — SIGNIFICANT CHANGE UP (ref 6–8.3)
PROTHROM AB SERPL-ACNC: 11 SEC — SIGNIFICANT CHANGE UP (ref 9.9–13.4)
RBC # BLD: 3.77 M/UL — LOW (ref 3.8–5.2)
RBC # FLD: 14 % — SIGNIFICANT CHANGE UP (ref 10.3–14.5)
SODIUM SERPL-SCNC: 133 MMOL/L — LOW (ref 135–145)
TROPONIN T, HIGH SENSITIVITY RESULT: 8 NG/L — SIGNIFICANT CHANGE UP
WBC # BLD: 12.08 K/UL — HIGH (ref 3.8–10.5)
WBC # FLD AUTO: 12.08 K/UL — HIGH (ref 3.8–10.5)

## 2025-03-02 PROCEDURE — 93010 ELECTROCARDIOGRAM REPORT: CPT

## 2025-03-02 PROCEDURE — 71045 X-RAY EXAM CHEST 1 VIEW: CPT | Mod: 26

## 2025-03-02 PROCEDURE — 99285 EMERGENCY DEPT VISIT HI MDM: CPT

## 2025-03-02 RX ORDER — HYDROMORPHONE/SOD CHLOR,ISO/PF 2 MG/10 ML
1 SYRINGE (ML) INJECTION ONCE
Refills: 0 | Status: DISCONTINUED | OUTPATIENT
Start: 2025-03-02 | End: 2025-03-02

## 2025-03-02 RX ADMIN — Medication 1 MILLIGRAM(S): at 23:49

## 2025-03-02 NOTE — ED PROVIDER NOTE - PATIENT PORTAL LINK FT
You can access the FollowMyHealth Patient Portal offered by [facility] by registering at the following website: [email or web address]. By joining [facility]’s FollowMyHealth portal, you will also be able to view your health information using other applications (apps) compatible with our system.

## 2025-03-02 NOTE — ED PROVIDER NOTE - PROGRESS NOTE DETAILS
Samantha Watts MD PGY-3:  DVT study negative.  Labs largely nonactionable, including negative troponin and proBNP.  Magnesium low at 0.9, repletion ongoing.  Otherwise kidney function at baseline, if anything patient with evidence of mild dehydration on labs.  She is reassessed, reassured by results of workup however still with discomfort in back and right leg.  She would like to go home however is concerned about the right leg swelling.  Do not feel inclined to adjust patient's diuretics at this time given she is not clinically volume overloaded and symptoms appear to be chronic in nature.  Discussed p.o. Dilaudid as this is a medication patient has at home, however she states that this had not been working for her which is why she had the baclofen pump placed.  At this time we will offer additional 1 mg IV push Dilaudid as well as lidocaine patch, repeat labs including lactate and magnesium level and reassess pain level for possible discharge. Samantha Watts MD PGY-3: patient reassessed, still unable to urinate, bladder scan with >350 cc urine in bladder, patient denies urge to urinate. notes hx urinary retention in the past. will place Cadena. patient agreeable to DC after Cadena placed. is a home calls patient so has established follow up in place. will arrange for nonemergent transport home via SW.

## 2025-03-02 NOTE — ED PROVIDER NOTE - ATTENDING CONTRIBUTION TO CARE
The patient is a 54y Obese Female former smoker who has a past medical and surgery history of HTN Marijuana smoker, continuous DM2  c/b neuropathy Complex regional pain syndrome type 1 Asthma IIH Migraine cholecystectomy Spinal cord neurostimulator device in situ IT pump placed 11/22/24 PTED c/o bilateral leg swelling over the past 2 weeks and urinary retention s/ SOB and "dark urine". She denies chest pain, N/V, headache.     Vital Signs Last 24 Hrs  T(F): 98.1 HR: 72 BP: 113/62 RR: 18 SpO2: 97% (02 Mar 2025 21:10) (97% - 97%)  PE: as described; my additions and exceptions are noted in the chart    DATA:  EKG: pending at time of evaluation  LAB: Pending at time of evaluation    IMPRESSION/RISK:  Dx= Anuria    Consideration include Tissue swelling anuria HTN make r/o of renal failure from pre/post or intrinsic renal dz front and center  Plan  UA  check for retention CT r/o hydro creatinine   reassess   dispo as per results and findings

## 2025-03-02 NOTE — ED PROVIDER NOTE - OBJECTIVE STATEMENT
54Y F PMH chronic back pain status post thoracic spinal nerve stimulator, intrathecal baclofen pump placement, complex regional pain syndrome, DM, asthma, HTN, presenting with shortness of breath and difficulty urinating.  Patient reports that she has had increasing peripheral edema and face as well as lower extremities, concern for fluid retention.  Also notes that she has been urinating less than normal, the urine she does produce is dark and somewhat foamy. Denies fevers, chest pain or SOB, abdominal pain. Her chronic back pain is about at her baseline at this time.

## 2025-03-02 NOTE — ED PROVIDER NOTE - NSFOLLOWUPINSTRUCTIONS_ED_ALL_ED_FT
The results of all of your testing are included in this paperwork.     Return to the ER if you develop new or worsening symptoms including fevers, chest pain, difficulty breathing, or if you are otherwise concerned.     Indwelling Urinary Catheter Care    An indwelling urinary catheter is a thin tube that is put into your bladder. The tube helps to drain pee (urine) out of your body. The tube goes in through your urethra. Your urethra is where pee comes out of your body. Your pee will come out through the catheter, then it will go into a bag (drainage bag).    Take good care of your catheter so it will work well.    What are the risks?    •Germs may get into your bladder and cause an infection.  •The tube can become blocked.  •Tissue near the catheter may become irritated and may bleed.    How to wear your catheter and drainage bag    Supplies needed     •Sticky tape (adhesive tape) or a leg strap.  •Alcohol wipe or soap and water (if you use tape).  •A clean towel (if you use tape).  •Large overnight bag.  •Smaller bag (leg bag).    Wearing your catheter     Attach your catheter to your leg with tape or a leg strap.  •Make sure the catheter is not pulled tight.    •If a leg strap gets wet, take it off and put on a dry strap.    •If you use tape to hold the bag on your le.Use an alcohol wipe or soap and water to wash your skin where the tape made it sticky before.  2.Use a clean towel to pat-dry that skin.  3.Use new tape to make the bag stay on your leg.    Wearing your bags    You should have been given a large overnight bag.  •You may wear the overnight bag in the day or night.  •Always have the overnight bag lower than your bladder.  Do not let the bag touch the floor.  •Before you go to sleep, put a clean plastic bag in a wastebasket. Then, hang the overnight bag inside the wastebasket.    You should also have a smaller leg bag that fits under your clothes.  •Wear the leg bag as told by the product maker. This may be above or below the knee, depending on the length of the tubing.  •Make sure that the leg bag is below the bladder.  •Make sure that the tubing does not have loops or too much tension.  •Do not wear your leg bag at night.    How to care for your skin and catheter    Supplies needed     •A clean washcloth.  •Water and mild soap.  •A clean towel.    Caring for your skin and catheter     •Clean the skin around your catheter every day.    1.Wash your hands with soap and water.  2.Wet a clean washcloth in warm water and mild soap.  3.Clean the skin around your urethra.    •Shower every day. Do not take baths.  • Do not use cream, ointment, or lotion on the area where the catheter goes into your body, unless your doctor tells you to.  • Do not use powders, sprays, or lotions on your genital area.    •Check your skin around the catheter every day for signs of infection. Check for:  •Redness, swelling, or pain.  •Fluid or blood.  •Warmth  •Pus or a bad smell.    How to empty the bag    Supplies needed     •Rubbing alcohol.  •Gauze pad or cotton ball.  •Tape or a leg strap.      Emptying the bag     Pour the pee out of your bag when it is ?–½ full, or at least 2–3 times a day. Do this for your overnight bag and your leg bag.  1.Wash your hands with soap and water.  2.Separate (detach) the bag from your leg.  3.Hold the bag over the toilet or a clean pail. Keep the bag lower than your hips and bladder. This is so the pee (urine) does not go back into the tube.  4.Open the pour spout. It is at the bottom of the bag.  5.Empty the pee into the toilet or pail. Do not let the pour spout touch any surface.  6.Put rubbing alcohol on a gauze pad or cotton ball.  7.Use the gauze pad or cotton ball to clean the pour spout.  8.Close the pour spout.  9.Attach the bag to your leg with tape or a leg strap.  10.Wash your hands with soap and water.    Follow instructions for cleaning the drainage bag. Instructions can come from:  •The product maker.  •Your doctor.    How to change the bag    Changing the bag     Replace your bag when it starts to leak, smell bad, or look dirty.  1.Wash your hands with soap and water.  2.Separate the dirty bag from your leg.  3.Pinch the catheter with your fingers so that pee does not spill out.  4.Separate the catheter tube from the bag tube where these tubes connect (at the connection valve). Do not let the tubes touch any surface.  5.Clean the end of the catheter tube with an alcohol wipe. Use a different alcohol wipe to clean the end of the bag tube.  6.Connect the catheter tube to the tube of the clean bag.  7.Attach the clean bag to your leg with tape or a leg strap. Do not make the bag tight on your leg.  8.Wash your hands with soap and water.    General instructions  A person washing hands with soap and water.   • Never pull on your catheter. Never try to take it out. Doing that can hurt you.  •Always wash your hands before and after you touch your catheter or bag. Use a mild, fragrance-free soap. If you do not have soap and water, use hand .  •Always make sure there are no twists, bends, or kinks in the catheter tube.  •Always make sure there are no leaks in the catheter or bag.  •Drink enough fluid to keep your pee pale yellow.  • Do not take baths, swim, or use a hot tub.  •If you are female, wipe from front to back after you poop (have a bowel movement).    Contact a doctor if:    •Your catheter gets clogged.  •Your catheter leaks.    •You have signs of infection at the catheter site, such as:  •Redness, swelling, or pain where the catheter goes into your body.  •Fluid, blood, pus, or a bad smell coming from the area where the catheter goes into your body.  •Skin feels warm where the catheter goes into your body.    •You have signs of a bladder infection, such as:  •Fever.  •Chills.  •Pee smells worse than usual.  •Cloudy pee.  •Pain in your belly, legs, lower back, or bladder.  •Vomiting or feel like vomiting.    Get help right away if:    •You see blood in the catheter.  •Your pee is pink or red.  •Your bladder feels full.  •Your pee is not draining into the bag.  •Your catheter gets pulled out.    Summary    •An indwelling urinary catheter is a thin tube that is placed into the bladder to help drain pee (urine) out of the body.   •The catheter is placed into the part of the body that drains pee from the bladder (urethra).  •Taking good care of your catheter will keep it working well.  •Always wash your hands before and after touching your catheter or bag.  • Never pull on your catheter or try to take it out.

## 2025-03-02 NOTE — ED CLERICAL - NS ED CLERK NOTE PRE-ARRIVAL INFORMATION; ADDITIONAL PRE-ARRIVAL INFORMATION

## 2025-03-02 NOTE — ED ADULT TRIAGE NOTE - CHIEF COMPLAINT QUOTE
C/o bilateral leg swelling x 2 weeks and urinary retention x 1 day. reports SOB and "dark urine". denies chest pain, N/V, headache.  hx: CHF, asthma, DM, HTN.

## 2025-03-02 NOTE — ED PROVIDER NOTE - PHYSICAL EXAMINATION
GENERAL: Awake, alert, NAD  HEAD: NC/AT, neck supple, moist mucous membranes  EYES: PERRL, EOM grossly intact, sclera anicteric  LUNGS: normal WOB on RA, CTAB, no wheezes or crackles, speaking in full sentences   CARDIAC: RRR, no m/r/g  ABDOMEN: Soft, non tender, non distended, no rebound, no guarding  BACK: Tender over thoracic paraspinous musculature, No midline spinal tenderness, no CVA tenderness  EXT: RLE tender to palpation with bilateral lower extremity edema, no bony deformity  NEURO: A&Ox3. Moving all extremities.  SKIN: Warm and dry. No rash.  PSYCH: Normal affect.

## 2025-03-02 NOTE — ED ADULT NURSE NOTE - OBJECTIVE STATEMENT
Pt received to room 14. Pt is a 54 year old female with Hx of Dm2, CHF, complex regional pain syndrome. Pt presented to ED c/o urinary retention x3 days. endorsing having to "force out" her urine. Endorses burning when she can urinate. 7/10 pain to bacl, bilateral arms. edema noted to bilateral lower and upper extremities. denies chest pain, SOB, headache, dizziness, abdominal pain, n/v/d, , fevers/chills, numbness/tingling.   Pt is A&Ox4, motorized chair user at baseline. neuro/sensory intact. airway patent, speaking clearly in full sentences. breathing is even and unlabored. abdomen is soft, nontender, nondistended. skin is intact. spontaneous movement of all extremities. Placed on cardiac monitor and continuous pulse oximetry. EKG in chart. VS as noted in flow sheet. 20G IV placed to L AC, +blood return, flushes without difficulty. labs collected and sent. awaiting imaging. comfort measures provided. stretcher set in lowest position, call bell within reach, safety maintained.

## 2025-03-02 NOTE — ED ADULT NURSE NOTE - NSFALLRISKINTERV_ED_ALL_ED

## 2025-03-02 NOTE — ED PROVIDER NOTE - CLINICAL SUMMARY MEDICAL DECISION MAKING FREE TEXT BOX
54Y F hx as above presenting with multiple complaints including RLE pain and swelling, decreased urine output, and mild SOB. On initial evaluation, patient with normal vital signs, including /62, HR 72, SpO2 97% on room air, notably speaking in full sentences without difficulty.  Lower extremities without evidence of pitting, does not appear volume overloaded on exam. Patient shows me photos on her phone from yesterday in which her right leg does appear notably larger relative to the left leg, notes that sizes and fluctuating over the last 2 days.  She is primarily concerned regarding urine retention.  Notes compliance with bumetanide 2 mg daily.  Will start with screening labs including troponin and proBNP as well as chest x-ray to assess for possible HF exacerbation/volume overload, as well as to assess electrolytes and kidney function prior to initiating fluids versus additional diuretics. Duplex right lower extremity to rule out clot in setting of reported asymmetry.  Will give IV Dilaudid and Tylenol for pain control.  Reassess to dispo.

## 2025-03-03 ENCOUNTER — NON-APPOINTMENT (OUTPATIENT)
Age: 55
End: 2025-03-03

## 2025-03-03 VITALS — TEMPERATURE: 97 F

## 2025-03-03 LAB
GAS PNL BLDV: SIGNIFICANT CHANGE UP
MAGNESIUM SERPL-MCNC: 0.9 MG/DL — CRITICAL LOW (ref 1.6–2.6)
MAGNESIUM SERPL-MCNC: 1.5 MG/DL — LOW (ref 1.6–2.6)

## 2025-03-03 PROCEDURE — 93971 EXTREMITY STUDY: CPT | Mod: 26,LT

## 2025-03-03 RX ORDER — MAGNESIUM SULFATE 500 MG/ML
2 SYRINGE (ML) INJECTION ONCE
Refills: 0 | Status: COMPLETED | OUTPATIENT
Start: 2025-03-03 | End: 2025-03-03

## 2025-03-03 RX ORDER — LIDOCAINE HYDROCHLORIDE 20 MG/ML
1 JELLY TOPICAL ONCE
Refills: 0 | Status: COMPLETED | OUTPATIENT
Start: 2025-03-03 | End: 2025-03-03

## 2025-03-03 RX ORDER — HYDROMORPHONE/SOD CHLOR,ISO/PF 2 MG/10 ML
1 SYRINGE (ML) INJECTION ONCE
Refills: 0 | Status: DISCONTINUED | OUTPATIENT
Start: 2025-03-03 | End: 2025-03-03

## 2025-03-03 RX ORDER — BUMETANIDE 1 MG/1
2 TABLET ORAL ONCE
Refills: 0 | Status: COMPLETED | OUTPATIENT
Start: 2025-03-03 | End: 2025-03-03

## 2025-03-03 RX ORDER — ACETAMINOPHEN 500 MG/5ML
1000 LIQUID (ML) ORAL ONCE
Refills: 0 | Status: COMPLETED | OUTPATIENT
Start: 2025-03-03 | End: 2025-03-03

## 2025-03-03 RX ADMIN — Medication 1 MILLIGRAM(S): at 04:03

## 2025-03-03 RX ADMIN — Medication 400 MILLIGRAM(S): at 01:23

## 2025-03-03 RX ADMIN — LIDOCAINE HYDROCHLORIDE 1 PATCH: 20 JELLY TOPICAL at 04:03

## 2025-03-03 RX ADMIN — BUMETANIDE 2 MILLIGRAM(S): 1 TABLET ORAL at 09:24

## 2025-03-03 RX ADMIN — Medication 1000 MILLIGRAM(S): at 02:34

## 2025-03-03 RX ADMIN — Medication 25 GRAM(S): at 02:12

## 2025-03-03 NOTE — PROVIDER CONTACT NOTE (OTHER) - ASSESSMENT
Pt is cleared for d/c.  PA confirmed the patient mode of transportation is [employer] and pt’s address was conformed by the Pt.  Provider is in agreement with [employer] back to residence.  [employer] Confirmation :[id number] ETA 10:30Am MAS:Donna# [id number]

## 2025-03-03 NOTE — ED ADULT NURSE REASSESSMENT NOTE - NS ED NURSE REASSESS COMMENT FT1
Break RN: Pt is A&Ox4, resting in stretcher with complaints of no relief of pain at this time. Dr. Watts aware. Respirations even and unlabored, chest rise equal b/l. VS as noted in flow sheets. Pt denies chest pain, SOB, abdominal pain, N/V/D, h/a, dizziness, numbness/tingling or any urinary symptoms at this time. Medications administered as ordered, see MAR. No acute distress noted. Safety maintained throughout.
Pt awake and alert, A&OX4, resp even and unlabored. Denies CP, SOB, HA, dizziness, palpitations, blurry vision. Endorsing 8/10 back and arm pain; medications administered per orders. Resting comfortably. Vitals as documented, no acute distress noted. Bed in lowest side rails up call bell in reach. Awaiting lab results.
Pt awake and alert, A&OX4, resp even and unlabored. Denies CP, SOB, HA, dizziness, palpitations, blurry vision. Resting comfortably. 16fr Cadena catheter placed per orders, 800mL initial output of straw colored urine noted. Sterility maintained, pt tolerated procedure well. Vitals as documented. No acute distress noted. Bed in lowest side rails up call bell in reach. Awaiting discharge at this time.
Patient received from night shift nurse. patient is pending  from [facility]. no complaints at this time other than chronic back pain. patient has a Cadena in place, drainage bag was emptied prior to discharge. patient drainage bag not changed to leg bag due to wheel chair bound status. IV removed. plan of care ongoing,

## 2025-03-05 ENCOUNTER — APPOINTMENT (OUTPATIENT)
Dept: UROLOGY | Facility: CLINIC | Age: 55
End: 2025-03-05
Payer: MEDICARE

## 2025-03-05 VITALS
DIASTOLIC BLOOD PRESSURE: 68 MMHG | TEMPERATURE: 97.3 F | HEART RATE: 62 BPM | OXYGEN SATURATION: 96 % | SYSTOLIC BLOOD PRESSURE: 107 MMHG

## 2025-03-05 DIAGNOSIS — R33.9 RETENTION OF URINE, UNSPECIFIED: ICD-10-CM

## 2025-03-05 PROCEDURE — 99213 OFFICE O/P EST LOW 20 MIN: CPT

## 2025-03-11 PROBLEM — R33.9 URINARY RETENTION: Status: ACTIVE | Noted: 2025-03-11

## 2025-03-12 ENCOUNTER — APPOINTMENT (OUTPATIENT)
Dept: UROLOGY | Facility: CLINIC | Age: 55
End: 2025-03-12
Payer: MEDICARE

## 2025-03-12 VITALS
HEART RATE: 59 BPM | BODY MASS INDEX: 44.41 KG/M2 | DIASTOLIC BLOOD PRESSURE: 71 MMHG | OXYGEN SATURATION: 97 % | WEIGHT: 293 LBS | SYSTOLIC BLOOD PRESSURE: 137 MMHG | TEMPERATURE: 98.8 F | HEIGHT: 68 IN

## 2025-03-12 DIAGNOSIS — R33.9 RETENTION OF URINE, UNSPECIFIED: ICD-10-CM

## 2025-03-12 PROCEDURE — 99213 OFFICE O/P EST LOW 20 MIN: CPT

## 2025-03-25 ENCOUNTER — APPOINTMENT (OUTPATIENT)
Dept: HOME HEALTH SERVICES | Facility: HOME HEALTH | Age: 55
End: 2025-03-25

## 2025-03-25 NOTE — ED ADULT NURSE NOTE - CAS EDN DISCHARGE INTERVENTIONS
PHYSICAL / OCCUPATIONAL THERAPY - DAILY TREATMENT NOTE     Patient Name: Satya Ruvalcaba    Date: 3/25/2025    : 1991  Insurance: [place] / [place] / Product Type: *No Product type* /      Patient  verified Yes     Visit #   Current / Total 2 24   Time   In / Out 5:50 6:48   Pain   In / Out -2/10 1/10   Subjective Functional Status/Changes: No new complaints.   Changes to:  Allergies, Med Hx, Sx Hx?   no       TREATMENT AREA =  Lumbago with sciatica, right side [M54.41]    If an interpreting service is utilized for treatment of this patient, the contents of this document represent the material reviewed with the patient via the .     OBJECTIVE    Modalities Rationale:     decrease pain, increase tissue extensibility, and increase muscle contraction/control to improve patient's ability to progress to PLOF and address remaining functional goals.    10 min [x] Estim Unattended, type/location:  [place] 4\"/12\" Right gastroc - Pt prone performing ankle PF.                                     []  w/ice    []  w/heat    min [] Estim Attended, type/location:                                     []  w/US     []  w/ice    []  w/heat    []  TENS insruct     10 min [x]  Mechanical Traction: type/lbs  L/S - 65#                  [x]  pro   []  sup   []  int   [x]  cont    []  before manual    []  after manual    min []  Ultrasound, settings/location:      min []  Iontophoresis w/ dexamethasone, location:                                               []  take home patch       []  in clinic        min  unbilled []  Ice     []  Heat    location/position:     min []  Paraffin,  details:     min []  Vasopneumatic Device, press/temp:     min []  Whirlpool / Fluido:    If using vaso (only need to measure limb vaso being performed on)      pre-treatment girth :       post-treatment girth :       measured at (landmark location) :      min []  Other:    Skin assessment post-treatment (if applicable):    []   IV discontinued, cath removed intact

## 2025-03-30 ENCOUNTER — EMERGENCY (EMERGENCY)
Facility: HOSPITAL | Age: 55
LOS: 1 days | Discharge: HOME | End: 2025-03-30
Attending: EMERGENCY MEDICINE | Admitting: EMERGENCY MEDICINE
Payer: MEDICARE

## 2025-03-30 VITALS
TEMPERATURE: 98 F | SYSTOLIC BLOOD PRESSURE: 114 MMHG | OXYGEN SATURATION: 95 % | WEIGHT: 220.46 LBS | RESPIRATION RATE: 16 BRPM | HEIGHT: 68 IN | DIASTOLIC BLOOD PRESSURE: 63 MMHG | HEART RATE: 66 BPM

## 2025-03-30 DIAGNOSIS — Z98.890 OTHER SPECIFIED POSTPROCEDURAL STATES: Chronic | ICD-10-CM

## 2025-03-30 DIAGNOSIS — Z90.49 ACQUIRED ABSENCE OF OTHER SPECIFIED PARTS OF DIGESTIVE TRACT: Chronic | ICD-10-CM

## 2025-03-30 DIAGNOSIS — Z96.82 PRESENCE OF NEUROSTIMULATOR: Chronic | ICD-10-CM

## 2025-03-30 LAB
ALBUMIN SERPL ELPH-MCNC: 3.9 G/DL — SIGNIFICANT CHANGE UP (ref 3.3–5)
ALP SERPL-CCNC: 131 U/L — HIGH (ref 40–120)
ALT FLD-CCNC: 18 U/L — SIGNIFICANT CHANGE UP (ref 4–33)
ANION GAP SERPL CALC-SCNC: 17 MMOL/L — HIGH (ref 7–14)
APTT BLD: 31.1 SEC — SIGNIFICANT CHANGE UP (ref 24.5–35.6)
BASOPHILS # BLD AUTO: 0.05 K/UL — SIGNIFICANT CHANGE UP (ref 0–0.2)
BILIRUB SERPL-MCNC: 0.4 MG/DL — SIGNIFICANT CHANGE UP (ref 0.2–1.2)
BLD GP AB SCN SERPL QL: NEGATIVE — SIGNIFICANT CHANGE UP
BLOOD GAS VENOUS COMPREHENSIVE RESULT: SIGNIFICANT CHANGE UP
BUN SERPL-MCNC: 22 MG/DL — SIGNIFICANT CHANGE UP (ref 7–23)
CALCIUM SERPL-MCNC: 9.1 MG/DL — SIGNIFICANT CHANGE UP (ref 8.4–10.5)
CHLORIDE SERPL-SCNC: 100 MMOL/L — SIGNIFICANT CHANGE UP (ref 98–107)
CO2 SERPL-SCNC: 20 MMOL/L — LOW (ref 22–31)
CREAT SERPL-MCNC: 1.34 MG/DL — HIGH (ref 0.5–1.3)
EGFR: 47 ML/MIN/1.73M2 — LOW
EGFR: 47 ML/MIN/1.73M2 — LOW
EOSINOPHIL # BLD AUTO: 0.17 K/UL — SIGNIFICANT CHANGE UP (ref 0–0.5)
EOSINOPHIL NFR BLD AUTO: 1.5 % — SIGNIFICANT CHANGE UP (ref 0–6)
GLUCOSE SERPL-MCNC: 109 MG/DL — HIGH (ref 70–99)
HGB BLD-MCNC: 13.8 G/DL — SIGNIFICANT CHANGE UP (ref 11.5–15.5)
IANC: 6.03 K/UL — SIGNIFICANT CHANGE UP (ref 1.8–7.4)
IMM GRANULOCYTES NFR BLD AUTO: 0.3 % — SIGNIFICANT CHANGE UP (ref 0–0.9)
INR BLD: 0.96 RATIO — SIGNIFICANT CHANGE UP (ref 0.85–1.16)
LYMPHOCYTES # BLD AUTO: 4.71 K/UL — HIGH (ref 1–3.3)
LYMPHOCYTES # BLD AUTO: 40.4 % — SIGNIFICANT CHANGE UP (ref 13–44)
MCHC RBC-ENTMCNC: 29.2 PG — SIGNIFICANT CHANGE UP (ref 27–34)
MCHC RBC-ENTMCNC: 32.9 G/DL — SIGNIFICANT CHANGE UP (ref 32–36)
MCV RBC AUTO: 88.6 FL — SIGNIFICANT CHANGE UP (ref 80–100)
MONOCYTES # BLD AUTO: 0.66 K/UL — SIGNIFICANT CHANGE UP (ref 0–0.9)
MONOCYTES NFR BLD AUTO: 5.7 % — SIGNIFICANT CHANGE UP (ref 2–14)
NEUTROPHILS # BLD AUTO: 6.03 K/UL — SIGNIFICANT CHANGE UP (ref 1.8–7.4)
NEUTROPHILS NFR BLD AUTO: 51.7 % — SIGNIFICANT CHANGE UP (ref 43–77)
NRBC # BLD AUTO: 0 K/UL — SIGNIFICANT CHANGE UP (ref 0–0)
NRBC # FLD: 0 K/UL — SIGNIFICANT CHANGE UP (ref 0–0)
NRBC BLD AUTO-RTO: 0 /100 WBCS — SIGNIFICANT CHANGE UP (ref 0–0)
PLATELET # BLD AUTO: 452 K/UL — HIGH (ref 150–400)
POTASSIUM SERPL-MCNC: 4.3 MMOL/L — SIGNIFICANT CHANGE UP (ref 3.5–5.3)
POTASSIUM SERPL-SCNC: 4.3 MMOL/L — SIGNIFICANT CHANGE UP (ref 3.5–5.3)
PROT SERPL-MCNC: 8.4 G/DL — HIGH (ref 6–8.3)
PROTHROM AB SERPL-ACNC: 11.4 SEC — SIGNIFICANT CHANGE UP (ref 9.9–13.4)
RBC # BLD: 4.73 M/UL — SIGNIFICANT CHANGE UP (ref 3.8–5.2)
RBC # FLD: 12.8 % — SIGNIFICANT CHANGE UP (ref 10.3–14.5)
RH IG SCN BLD-IMP: POSITIVE — SIGNIFICANT CHANGE UP
SODIUM SERPL-SCNC: 137 MMOL/L — SIGNIFICANT CHANGE UP (ref 135–145)
WBC # BLD: 11.66 K/UL — HIGH (ref 3.8–10.5)
WBC # FLD AUTO: 11.66 K/UL — HIGH (ref 3.8–10.5)

## 2025-03-30 PROCEDURE — 74177 CT ABD & PELVIS W/CONTRAST: CPT | Mod: 26

## 2025-03-30 PROCEDURE — 99285 EMERGENCY DEPT VISIT HI MDM: CPT

## 2025-03-30 PROCEDURE — 93010 ELECTROCARDIOGRAM REPORT: CPT

## 2025-03-30 PROCEDURE — 72132 CT LUMBAR SPINE W/DYE: CPT | Mod: 26

## 2025-03-30 RX ORDER — HYDROMORPHONE/SOD CHLOR,ISO/PF 2 MG/10 ML
1 SYRINGE (ML) INJECTION ONCE
Refills: 0 | Status: DISCONTINUED | OUTPATIENT
Start: 2025-03-30 | End: 2025-03-30

## 2025-03-30 RX ADMIN — Medication 1 MILLIGRAM(S): at 22:51

## 2025-03-30 NOTE — ED CLERICAL - NS ED CLERK NOTE PRE-ARRIVAL INFORMATION; ADDITIONAL PRE-ARRIVAL INFORMATION

## 2025-03-30 NOTE — ED PROVIDER NOTE - OBJECTIVE STATEMENT
54-year-old female past medical history of hypertension, neuropathy, obesity, diabetes, complex regional pain syndrome type II, intercostal neuralgia, asthma, CHF, migraines, former smoker, s/p IT Dilaudid pump placed 11/22/2024 presents for evaluation of a wound at the IT pump placement site onset the beginning of March that has been worsening and causing increased pain over the last week.  Patient reports chills denies fever, states her daughter noticed today that there was purulent drainage from the wound.  Denies new numbness and tingling, chest pain shortness of breath abdominal pain.  Patient complains of a headache and nausea however states she experiences this chronically.  Patient reports that she has been having issues with urination, stating she has to force urine out, has been evaluated by urology within the last month who stated it was likely associated with the medication she was receiving.  Denies any dysuria.

## 2025-03-30 NOTE — ED ADULT NURSE NOTE - OBJECTIVE STATEMENT
Patient received in room 1. Patient is AOx4, ambulatory with a cane, able to speak in full sentences, c/o lower back pain. Patient has a past medical history of complex regional pain syndrome type II, intercostal neuralgia, asthma, CHF, HTN, Neuropathy, DM2. Patient states she had an epidural pump placed in November, and now she is noticing an open wound hear her upper buttock/lower back area. Wound noted to be scabbed over; no open wounds or active bleeding/drainage noted. Patient uncomfortable on stretcher. Denies chest pain, SOB, N/V, headache or palpitations.  Patient pending IV line. EKG in chart. Patient in no signs of acute distress. Patient pending CT scan. Respirations even and unlabored, chest rise symmetrical b/l. Comfort measures maintained. Bed in lowest position. Safety maintained.

## 2025-03-30 NOTE — ED PROVIDER NOTE - PHYSICAL EXAMINATION
GENERAL: uncomfortable appearing   HEAD: normocephalic, atraumatic  HEENT: normal conjunctiva, oral mucosa moist  CARDIAC: regular rate and rhythm, no appreciable murmurs  PULM: normal breath sounds, clear to ascultation bilaterally, no rales, rhonchi, wheezing  GI: abdomen nondistended, soft, nontender, no guarding, rebound tenderness  : right CVA tenderness, no suprapubic tenderness,   NEURO: no focal motor or sensory deficits, normal gait, AAOx3  MSK: strength 5/5 in LE bilaterally, no midline spinal tenderness   SKIN: scabbed over wound above gluteal cleft with mild surrounding erythema and tenderness, no fluctuance and induration, midline incision well healed   PSYCH: appropriate mood and affect

## 2025-03-30 NOTE — ED ADULT TRIAGE NOTE - CHIEF COMPLAINT QUOTE
Pt had epidural pain pump placed in November. Family noted pt has open wound on buttocks by incision site worsening over the past month. Pt reporting pain to area and green discharge. Denies any new trauma or injury. Hx: HTN, Neuropathy, DM2

## 2025-03-30 NOTE — ED PROVIDER NOTE - ATTENDING CONTRIBUTION TO CARE
The patient is a 54y Obese Female former smoker who has a past medical and surgery history of HTN Marijuana smoker, continuous DM2  c/b neuropathy Complex regional pain syndrome type 1 Asthma IIH Migraine cholecystectomy Spinal cord neurostimulator device in situ IT pump placed 11/22/24 PTED  Pt had epidural pain pump placed in November. Family noted pt has open wound on buttocks by incision site worsening over the past month. Pt reporting pain to area and green discharge. Denies any new trauma or injury.   Vital Signs Last 24 Hrs  T(F): 98.2 HR: 66 BP: 114/63 RR: 16 SpO2: 95% (30 Mar 2025 20:34)   PE: as described; my additions and exceptions are noted in the chart    DATA:  EKG: pending at time of evaluation  LAB: Pending at time of evaluation    IMPRESSION/RISK:  Dx= Wound  Consideration include Probable decubiti given location family was concerned that could be related to Dilaudid pump  Plan  UA  check for retention CT r/o hydro creatinine and also of Lumbar spine to r/o fistulous tract between device and wound   reassess   dispo as per results and findings.

## 2025-03-30 NOTE — ED PROVIDER NOTE - NSFOLLOWUPCLINICS_GEN_ALL_ED_FT
[facility]  Wound Care  [address] [place] [address]  Phone: [phone]  Fax: [phone]    [facility]  Podiatry/Wound Care  [address] [place] [address]  Phone: [phone]  Fax: [phone]    [facility]  Wound Care  [address] [place] [address]  Phone: [phone]  Fax: [phone]    [facility]  Wound Care  [address] [place] [address]  Phone: [phone]  Fax:

## 2025-03-30 NOTE — ED PROVIDER NOTE - CLINICAL SUMMARY MEDICAL DECISION MAKING FREE TEXT BOX
54-year-old female past medical history of hypertension, neuropathy, obesity, diabetes, complex regional pain syndrome type II, intercostal neuralgia, asthma, CHF, migraines, former smoker, s/p IT Dilaudid pump placed 11/22/2024 presents for evaluation of a wound at the IT pump placement site onset the beginning of March that has been worsening and causing increased pain over the last week..  On exam wound is scabbed over without any purulent drainage, there is tenderness with mild erythema no fluctuance.  Differential diagnosis includes but not limited to fistula tract versus sacral decubitus ulcer that is developing versus abscess versus IT infection versus pyelonephritis.  Plan for CT of the abdomen pelvis, CT of the spine to evaluate the IT site, labs ESR CRP.  Dispo pending findings.  Do not suspect cord compression at this time given patient is ambulating with intact strength, patient states she is at baseline with her lower extremity peripheral neuropathy no increase in numbness, however urinary dysfunction may be related to the IT pump.

## 2025-03-30 NOTE — ED PROVIDER NOTE - NSFOLLOWUPINSTRUCTIONS_ED_ALL_ED_FT
You were seen in the emergency room for a wound at the incision site for your intrathecal pump.  We obtained labs and imaging that are included in this discharge packet.  We had neurosurgery see you who recommended you follow-up with wound care as it is likely that your wound is dehiscing open from the constant pulling of your skin when you sit/move.    We recommend that you follow-up with your pain management doctor for further evaluation.    Please apply bacitracin ointment and bandage to wound daily.    Please return to the emergency room if you have any new or worsening symptoms or signs of infection.    our discharge lounge will call you to help you arrange follow up. please expect a call in the next 2-3 days.

## 2025-03-30 NOTE — ED PROVIDER NOTE - NSFOLLOWUPCLINICSTOKEN_GEN_ALL_ED_FT
[id number]: || ||00\01||False;[id number]: || ||00\01||False;[id number]: || ||00\01||False;[id number]: || ||00\01||False;

## 2025-03-30 NOTE — ED PROVIDER NOTE - PROGRESS NOTE DETAILS
Tara Ramon MD, PGY3:    Neurosurgery evaluated patient at bedside and recommends referral to wound care for further evaluation management.  Patient agrees with plan.  Patient reports feeling improved at this time request an additional dose of pain medication.  Patient reports that she will follow-up with her pain management doctor already has an appointment scheduled for April 15, and will follow-up with wound care.  Will give referral for wound care.  Patient given return precautions.  Patient verbalized understanding of all findings and labs and imaging and discharge instructions.  All questions answered.  Patient ready for discharge.  Will contact social work to arrange for transportation home as patient needs a nonemergent ambulette.

## 2025-03-30 NOTE — ED PROVIDER NOTE - PATIENT PORTAL LINK FT
You can access the FollowMyHealth Patient Portal offered by [facility] by registering at the following website: [email or web address]. By joining [employer]’s FollowMyHealth portal, you will also be able to view your health information using other applications (apps) compatible with our system.

## 2025-03-31 VITALS
RESPIRATION RATE: 16 BRPM | OXYGEN SATURATION: 98 % | HEART RATE: 60 BPM | DIASTOLIC BLOOD PRESSURE: 59 MMHG | TEMPERATURE: 98 F | SYSTOLIC BLOOD PRESSURE: 125 MMHG

## 2025-03-31 DIAGNOSIS — T81.89XA OTHER COMPLICATIONS OF PROCEDURES, NOT ELSEWHERE CLASSIFIED, INITIAL ENCOUNTER: ICD-10-CM

## 2025-03-31 PROCEDURE — 99282 EMERGENCY DEPT VISIT SF MDM: CPT

## 2025-03-31 RX ORDER — HYDROMORPHONE/SOD CHLOR,ISO/PF 2 MG/10 ML
1 SYRINGE (ML) INJECTION ONCE
Refills: 0 | Status: DISCONTINUED | OUTPATIENT
Start: 2025-03-31 | End: 2025-03-31

## 2025-03-31 RX ORDER — HYDROMORPHONE/SOD CHLOR,ISO/PF 2 MG/10 ML
0.5 SYRINGE (ML) INJECTION ONCE
Refills: 0 | Status: DISCONTINUED | OUTPATIENT
Start: 2025-03-31 | End: 2025-03-31

## 2025-03-31 RX ORDER — BACLOFEN 10 MG/20ML
20 INJECTION INTRATHECAL ONCE
Refills: 0 | Status: COMPLETED | OUTPATIENT
Start: 2025-03-31 | End: 2025-03-31

## 2025-03-31 RX ORDER — OXYCODONE HYDROCHLORIDE AND ACETAMINOPHEN 10; 325 MG/1; MG/1
1 TABLET ORAL ONCE
Refills: 0 | Status: COMPLETED | OUTPATIENT
Start: 2025-03-31 | End: 2025-03-31

## 2025-03-31 RX ADMIN — BACLOFEN 20 MILLIGRAM(S): 10 INJECTION INTRATHECAL at 03:07

## 2025-03-31 RX ADMIN — Medication 1 MILLIGRAM(S): at 00:41

## 2025-03-31 RX ADMIN — Medication 1 MILLIGRAM(S): at 05:44

## 2025-03-31 RX ADMIN — Medication 1 MILLIGRAM(S): at 02:46

## 2025-03-31 RX ADMIN — Medication 1 MILLIGRAM(S): at 00:29

## 2025-03-31 NOTE — CONSULT NOTE ADULT - SUBJECTIVE AND OBJECTIVE BOX
Patient is a 53 YO female, Hx complex regional pain syndrome, morbid obesity, DM, HTN, CHF, IBS who had an intrathecal dilaudid pump placed at [facility] 11/2024 with a revision 2/17/25 for a flipped pump (Dr Lia Carter). patient presents with an incompletely healed lumbar incision, she states the lower aspect of the incision has scabbed over but has not fully healed. Denies fevers or chills, no purulent drainage noted from wound. She relates a history of slow wound healing since childhood.    WD obese female in NAD  Vital Signs Last 24 Hrs  T(C): 36.7 (31 Mar 2025 00:33), Max: 37.6 (30 Mar 2025 21:56)  T(F): 98.1 (31 Mar 2025 00:33), Max: 99.7 (30 Mar 2025 21:56)  HR: 63 (31 Mar 2025 02:40) (63 - 67)  BP: 140/61 (31 Mar 2025 02:40) (114/63 - 140/61)  BP(mean): 78 (31 Mar 2025 00:33) (78 - 78)  RR: 18 (31 Mar 2025 02:40) (16 - 20)  SpO2: 99% (31 Mar 2025 02:40) (95% - 99%)    Parameters below as of 31 Mar 2025 02:40  Patient On (Oxygen Delivery Method): room air    AAO X 3  PERRLA, EOMI  Face symmetric  BECERRA strength 5/5  SILT    Right hip incision well healed, lumbar incision well healed except an approximately 1CM area within the upper aspect of her gluteal cleft. No redness, swelling, or discharge, appears to be granulation tissue.    < from: CT Lumbar Spine Reform w/ IV Cont (03.30.25 @ 23:38) >  LOWER CHEST: Left basilar linear atelectasis.    LIVER: Within normal limits.  BILE DUCTS: Normal caliber.  GALLBLADDER: Cholecystectomy.  SPLEEN: Within normal limits.  PANCREAS: Within normal limits.  ADRENALS: Within normal limits.  KIDNEYS/URETERS: Within normal limits.    BLADDER: Within normal limits.  REPRODUCTIVE ORGANS: Intrauterine device.No adnexal mass.    BOWEL: No bowel obstruction. Small periampullary duodenal diverticulum.   Appendix is normal.  PERITONEUM/RETROPERITONEUM: Within normal limits.  VESSELS: Atherosclerotic changes.  LYMPH NODES: No lymphadenopathy.  ABDOMINAL WALL:Bilateral posterior intrathecal pumps are in place. The   left intrathecal pump catheter with coiled loops at the midline and   surrounding fluid tracking posteriorly with adjacent skin thickening. No   walled off fluid collection.  BONES: Degenerative changes.    LUMBAR SPINE:  VERTEBRAE:  Normal in height. No acute fracture. No significant   osteophytosis.  ALIGNMENT: No subluxation or scoliosis.  INTERVERTEBRAL DISC SPACES: Multilevel disc bulges. No significant spinal   canal or neural foraminal stenosis.    IMPRESSION:  Left posterior intrathecal pump catheter with coiled loops at the midline   and surrounding fluid tracking posteriorly with adjacent skin thickening.   No walled off fluid collection.    Multilevel degenerative changes ofthe lumbar spine.    < end of copied text >

## 2025-03-31 NOTE — PROVIDER CONTACT NOTE (OTHER) - ASSESSMENT
Pt is cleared for d/c.  PA confirmed the patient mode of transportation is Ambulette and pt’s address with the Pt.  Provider is in agreement with Ambulette back to residence.  Senior Ride Confirmation : [phone] ETA 10AM

## 2025-03-31 NOTE — ED ADULT NURSE REASSESSMENT NOTE - NS ED NURSE REASSESS COMMENT FT1
Patient c/o lower back pain. VS noted. MD Pacheco made aware. Patient medicated per chart. Respirations even and unlabored, chest rise symmetrical b/l. Comfort measures maintained. Bed in lowest position. Safety maintained.
Patient is alert and in no signs of acute distress. Patient medicated per chart. MD at bedside for neuro consult. Respirations even and unlabored, chest rise symmetrical b/l. Comfort measures maintained. Bed in lowest position. Safety maintained.
Patient is alert and states "my pain is coming back again"; MD Pacheco made aware. VS noted. Respirations even and unlabored, chest rise symmetrical b/l. Patient uncomfortable on stretcher. Patient to be medicated per chart. Comfort measures maintained. Bed in lowest position. Call bell within reach. Safety maintained.
Received report from night shift RN. Pt A&oX4. Breathing even and unlabored. Pt sleeping comfortably in stretcher. Safety and comfort measures in place. Social work made aware and  time arriving 10:00 am.
Break RN: Received report from primary RN at break coverage time 0130. Pt is A&Ox4, resting in stretcher with complaints of lower back pain returning at this time. Dr. Ramon made aware. Respirations even and unlabored, chest rise equal b/l. . Pt denies chest pain, SOB, abdominal pain, nausea, h/a, dizziness, numbness/tingling or any urinary symptoms at this time. No acute distress noted. Safety maintained throughout.

## 2025-03-31 NOTE — CONSULT NOTE ADULT - PROBLEM SELECTOR RECOMMENDATION 9
No neurosurgical intervention required at this time  Please refer to wound care clinic  Follow up with primary operative surgeon

## 2025-03-31 NOTE — CONSULT NOTE ADULT - ASSESSMENT
53 YO female S/P placement of an intrathecal dilaudid pump in 11/2024 with a revision 2/17/25 at [facility]. Patient has no systemic signs of infection and CT shows no abscess.

## 2025-04-08 NOTE — DIETITIAN INITIAL EVALUATION ADULT. - DIET TYPE
[[id number]]
low sodium/consistent carbohydrate (no snacks)/+ Ensure Clear x 2/day (provides 480 kcal, 16 g protein)

## 2025-04-16 ENCOUNTER — APPOINTMENT (OUTPATIENT)
Dept: SURGERY | Facility: CLINIC | Age: 55
End: 2025-04-16

## 2025-04-23 ENCOUNTER — EMERGENCY (EMERGENCY)
Facility: HOSPITAL | Age: 55
LOS: 1 days | End: 2025-04-23
Attending: EMERGENCY MEDICINE | Admitting: EMERGENCY MEDICINE
Payer: MEDICARE

## 2025-04-23 ENCOUNTER — TRANSCRIPTION ENCOUNTER (OUTPATIENT)
Age: 55
End: 2025-04-23

## 2025-04-23 VITALS
SYSTOLIC BLOOD PRESSURE: 132 MMHG | OXYGEN SATURATION: 98 % | RESPIRATION RATE: 18 BRPM | DIASTOLIC BLOOD PRESSURE: 78 MMHG | HEART RATE: 51 BPM | HEIGHT: 68 IN | TEMPERATURE: 98 F

## 2025-04-23 DIAGNOSIS — Z96.82 PRESENCE OF NEUROSTIMULATOR: Chronic | ICD-10-CM

## 2025-04-23 DIAGNOSIS — Z98.890 OTHER SPECIFIED POSTPROCEDURAL STATES: Chronic | ICD-10-CM

## 2025-04-23 DIAGNOSIS — Z90.49 ACQUIRED ABSENCE OF OTHER SPECIFIED PARTS OF DIGESTIVE TRACT: Chronic | ICD-10-CM

## 2025-04-23 LAB
ALBUMIN SERPL ELPH-MCNC: 3.8 G/DL — SIGNIFICANT CHANGE UP (ref 3.3–5)
ALP SERPL-CCNC: 135 U/L — HIGH (ref 40–120)
ALT FLD-CCNC: 17 U/L — SIGNIFICANT CHANGE UP (ref 4–33)
ANION GAP SERPL CALC-SCNC: 16 MMOL/L — HIGH (ref 7–14)
AST SERPL-CCNC: 15 U/L — SIGNIFICANT CHANGE UP (ref 4–32)
BASOPHILS # BLD AUTO: 0.02 K/UL — SIGNIFICANT CHANGE UP (ref 0–0.2)
BASOPHILS NFR BLD AUTO: 0.2 % — SIGNIFICANT CHANGE UP (ref 0–2)
BILIRUB SERPL-MCNC: 0.6 MG/DL — SIGNIFICANT CHANGE UP (ref 0.2–1.2)
BUN SERPL-MCNC: 21 MG/DL — SIGNIFICANT CHANGE UP (ref 7–23)
CALCIUM SERPL-MCNC: 8.5 MG/DL — SIGNIFICANT CHANGE UP (ref 8.4–10.5)
CHLORIDE SERPL-SCNC: 95 MMOL/L — LOW (ref 98–107)
CO2 SERPL-SCNC: 24 MMOL/L — SIGNIFICANT CHANGE UP (ref 22–31)
CREAT SERPL-MCNC: 1.06 MG/DL — SIGNIFICANT CHANGE UP (ref 0.5–1.3)
EGFR: 62 ML/MIN/1.73M2 — SIGNIFICANT CHANGE UP
EGFR: 62 ML/MIN/1.73M2 — SIGNIFICANT CHANGE UP
EOSINOPHIL # BLD AUTO: 0.08 K/UL — SIGNIFICANT CHANGE UP (ref 0–0.5)
EOSINOPHIL NFR BLD AUTO: 0.6 % — SIGNIFICANT CHANGE UP (ref 0–6)
GLUCOSE SERPL-MCNC: 219 MG/DL — HIGH (ref 70–99)
HCT VFR BLD CALC: 35.2 % — SIGNIFICANT CHANGE UP (ref 34.5–45)
HGB BLD-MCNC: 11.6 G/DL — SIGNIFICANT CHANGE UP (ref 11.5–15.5)
IANC: 8.74 K/UL — HIGH (ref 1.8–7.4)
IMM GRANULOCYTES NFR BLD AUTO: 1.1 % — HIGH (ref 0–0.9)
LIDOCAIN IGE QN: 23 U/L — SIGNIFICANT CHANGE UP (ref 7–60)
LYMPHOCYTES # BLD AUTO: 24.6 % — SIGNIFICANT CHANGE UP (ref 13–44)
LYMPHOCYTES # BLD AUTO: 3.12 K/UL — SIGNIFICANT CHANGE UP (ref 1–3.3)
MCHC RBC-ENTMCNC: 28.9 PG — SIGNIFICANT CHANGE UP (ref 27–34)
MCHC RBC-ENTMCNC: 33 G/DL — SIGNIFICANT CHANGE UP (ref 32–36)
MCV RBC AUTO: 87.6 FL — SIGNIFICANT CHANGE UP (ref 80–100)
MONOCYTES # BLD AUTO: 0.6 K/UL — SIGNIFICANT CHANGE UP (ref 0–0.9)
MONOCYTES NFR BLD AUTO: 4.7 % — SIGNIFICANT CHANGE UP (ref 2–14)
NEUTROPHILS # BLD AUTO: 8.74 K/UL — HIGH (ref 1.8–7.4)
NEUTROPHILS NFR BLD AUTO: 68.8 % — SIGNIFICANT CHANGE UP (ref 43–77)
NRBC # BLD AUTO: 0 K/UL — SIGNIFICANT CHANGE UP (ref 0–0)
NRBC # FLD: 0 K/UL — SIGNIFICANT CHANGE UP (ref 0–0)
NRBC BLD AUTO-RTO: 0 /100 WBCS — SIGNIFICANT CHANGE UP (ref 0–0)
PLATELET # BLD AUTO: 412 K/UL — HIGH (ref 150–400)
POTASSIUM SERPL-MCNC: 4.2 MMOL/L — SIGNIFICANT CHANGE UP (ref 3.5–5.3)
POTASSIUM SERPL-SCNC: 4.2 MMOL/L — SIGNIFICANT CHANGE UP (ref 3.5–5.3)
PROT SERPL-MCNC: 8.1 G/DL — SIGNIFICANT CHANGE UP (ref 6–8.3)
RBC # BLD: 4.02 M/UL — SIGNIFICANT CHANGE UP (ref 3.8–5.2)
RBC # FLD: 12.6 % — SIGNIFICANT CHANGE UP (ref 10.3–14.5)
SODIUM SERPL-SCNC: 135 MMOL/L — SIGNIFICANT CHANGE UP (ref 135–145)
TROPONIN T, HIGH SENSITIVITY RESULT: 13 NG/L — SIGNIFICANT CHANGE UP
WBC # BLD: 12.7 K/UL — HIGH (ref 3.8–10.5)
WBC # FLD AUTO: 12.7 K/UL — HIGH (ref 3.8–10.5)

## 2025-04-23 PROCEDURE — 71046 X-RAY EXAM CHEST 2 VIEWS: CPT | Mod: 26

## 2025-04-23 PROCEDURE — 99285 EMERGENCY DEPT VISIT HI MDM: CPT | Mod: FS

## 2025-04-23 RX ORDER — HYDROMORPHONE/SOD CHLOR,ISO/PF 2 MG/10 ML
1 SYRINGE (ML) INJECTION ONCE
Refills: 0 | Status: DISCONTINUED | OUTPATIENT
Start: 2025-04-23 | End: 2025-04-23

## 2025-04-23 RX ORDER — ONDANSETRON HCL/PF 4 MG/2 ML
4 VIAL (ML) INJECTION ONCE
Refills: 0 | Status: COMPLETED | OUTPATIENT
Start: 2025-04-23 | End: 2025-04-23

## 2025-04-23 RX ADMIN — Medication 4 MILLIGRAM(S): at 17:54

## 2025-04-23 RX ADMIN — Medication 1 MILLIGRAM(S): at 21:33

## 2025-04-23 RX ADMIN — Medication 1 MILLIGRAM(S): at 17:53

## 2025-04-23 RX ADMIN — Medication 1000 MILLILITER(S): at 17:54

## 2025-04-23 RX ADMIN — Medication 1 MILLIGRAM(S): at 19:33

## 2025-04-23 RX ADMIN — Medication 1 MILLIGRAM(S): at 22:00

## 2025-04-23 RX ADMIN — Medication 1 MILLIGRAM(S): at 20:20

## 2025-04-23 RX ADMIN — Medication 4 MILLIGRAM(S): at 18:56

## 2025-04-24 ENCOUNTER — NON-APPOINTMENT (OUTPATIENT)
Age: 55
End: 2025-04-24

## 2025-04-24 VITALS
DIASTOLIC BLOOD PRESSURE: 58 MMHG | TEMPERATURE: 98 F | SYSTOLIC BLOOD PRESSURE: 124 MMHG | OXYGEN SATURATION: 96 % | HEART RATE: 65 BPM | RESPIRATION RATE: 16 BRPM

## 2025-04-27 ENCOUNTER — EMERGENCY (EMERGENCY)
Facility: HOSPITAL | Age: 55
LOS: 1 days | End: 2025-04-27
Attending: EMERGENCY MEDICINE | Admitting: EMERGENCY MEDICINE
Payer: MEDICARE

## 2025-04-27 VITALS
WEIGHT: 293 LBS | SYSTOLIC BLOOD PRESSURE: 121 MMHG | OXYGEN SATURATION: 98 % | DIASTOLIC BLOOD PRESSURE: 69 MMHG | HEIGHT: 68 IN | HEART RATE: 60 BPM | RESPIRATION RATE: 20 BRPM | TEMPERATURE: 98 F

## 2025-04-27 VITALS
HEART RATE: 55 BPM | DIASTOLIC BLOOD PRESSURE: 56 MMHG | RESPIRATION RATE: 18 BRPM | SYSTOLIC BLOOD PRESSURE: 114 MMHG | OXYGEN SATURATION: 96 % | TEMPERATURE: 99 F

## 2025-04-27 DIAGNOSIS — Z90.49 ACQUIRED ABSENCE OF OTHER SPECIFIED PARTS OF DIGESTIVE TRACT: Chronic | ICD-10-CM

## 2025-04-27 DIAGNOSIS — Z96.82 PRESENCE OF NEUROSTIMULATOR: Chronic | ICD-10-CM

## 2025-04-27 DIAGNOSIS — Z98.890 OTHER SPECIFIED POSTPROCEDURAL STATES: Chronic | ICD-10-CM

## 2025-04-27 LAB
ADD ON TEST-SPECIMEN IN LAB: SIGNIFICANT CHANGE UP
ALBUMIN SERPL ELPH-MCNC: 3.2 G/DL — LOW (ref 3.3–5)
ALBUMIN SERPL ELPH-MCNC: 3.5 G/DL — SIGNIFICANT CHANGE UP (ref 3.3–5)
ALP SERPL-CCNC: 115 U/L — SIGNIFICANT CHANGE UP (ref 40–120)
ALP SERPL-CCNC: 122 U/L — HIGH (ref 40–120)
ALT FLD-CCNC: 14 U/L — SIGNIFICANT CHANGE UP (ref 4–33)
ALT FLD-CCNC: SIGNIFICANT CHANGE UP U/L (ref 4–33)
ANION GAP SERPL CALC-SCNC: 12 MMOL/L — SIGNIFICANT CHANGE UP (ref 7–14)
ANION GAP SERPL CALC-SCNC: 8 MMOL/L — SIGNIFICANT CHANGE UP (ref 7–14)
AST SERPL-CCNC: 106 U/L — HIGH (ref 4–32)
AST SERPL-CCNC: 14 U/L — SIGNIFICANT CHANGE UP (ref 4–32)
BASOPHILS # BLD AUTO: 0.04 K/UL — SIGNIFICANT CHANGE UP (ref 0–0.2)
BASOPHILS NFR BLD AUTO: 0.3 % — SIGNIFICANT CHANGE UP (ref 0–2)
BILIRUB SERPL-MCNC: 0.3 MG/DL — SIGNIFICANT CHANGE UP (ref 0.2–1.2)
BILIRUB SERPL-MCNC: 0.8 MG/DL — SIGNIFICANT CHANGE UP (ref 0.2–1.2)
BUN SERPL-MCNC: 20 MG/DL — SIGNIFICANT CHANGE UP (ref 7–23)
BUN SERPL-MCNC: 21 MG/DL — SIGNIFICANT CHANGE UP (ref 7–23)
CALCIUM SERPL-MCNC: 7.9 MG/DL — LOW (ref 8.4–10.5)
CALCIUM SERPL-MCNC: 8.1 MG/DL — LOW (ref 8.4–10.5)
CHLORIDE SERPL-SCNC: 101 MMOL/L — SIGNIFICANT CHANGE UP (ref 98–107)
CHLORIDE SERPL-SCNC: 98 MMOL/L — SIGNIFICANT CHANGE UP (ref 98–107)
CO2 SERPL-SCNC: 19 MMOL/L — LOW (ref 22–31)
CO2 SERPL-SCNC: 24 MMOL/L — SIGNIFICANT CHANGE UP (ref 22–31)
CREAT SERPL-MCNC: 0.89 MG/DL — SIGNIFICANT CHANGE UP (ref 0.5–1.3)
CREAT SERPL-MCNC: 0.95 MG/DL — SIGNIFICANT CHANGE UP (ref 0.5–1.3)
CRP SERPL-MCNC: 11 MG/L — HIGH
EGFR: 71 ML/MIN/1.73M2 — SIGNIFICANT CHANGE UP
EGFR: 71 ML/MIN/1.73M2 — SIGNIFICANT CHANGE UP
EGFR: 77 ML/MIN/1.73M2 — SIGNIFICANT CHANGE UP
EGFR: 77 ML/MIN/1.73M2 — SIGNIFICANT CHANGE UP
EOSINOPHIL # BLD AUTO: 0.22 K/UL — SIGNIFICANT CHANGE UP (ref 0–0.5)
EOSINOPHIL NFR BLD AUTO: 1.7 % — SIGNIFICANT CHANGE UP (ref 0–6)
ERYTHROCYTE [SEDIMENTATION RATE] IN BLOOD: 29 MM/HR — HIGH (ref 4–25)
GLUCOSE SERPL-MCNC: 176 MG/DL — HIGH (ref 70–99)
GLUCOSE SERPL-MCNC: 187 MG/DL — HIGH (ref 70–99)
HCT VFR BLD CALC: 36.1 % — SIGNIFICANT CHANGE UP (ref 34.5–45)
HGB BLD-MCNC: 11.7 G/DL — SIGNIFICANT CHANGE UP (ref 11.5–15.5)
IANC: 8.63 K/UL — HIGH (ref 1.8–7.4)
IMM GRANULOCYTES NFR BLD AUTO: 0.4 % — SIGNIFICANT CHANGE UP (ref 0–0.9)
LYMPHOCYTES # BLD AUTO: 26.5 % — SIGNIFICANT CHANGE UP (ref 13–44)
LYMPHOCYTES # BLD AUTO: 3.44 K/UL — HIGH (ref 1–3.3)
MCHC RBC-ENTMCNC: 29.2 PG — SIGNIFICANT CHANGE UP (ref 27–34)
MCHC RBC-ENTMCNC: 32.4 G/DL — SIGNIFICANT CHANGE UP (ref 32–36)
MCV RBC AUTO: 90 FL — SIGNIFICANT CHANGE UP (ref 80–100)
MONOCYTES # BLD AUTO: 0.61 K/UL — SIGNIFICANT CHANGE UP (ref 0–0.9)
MONOCYTES NFR BLD AUTO: 4.7 % — SIGNIFICANT CHANGE UP (ref 2–14)
NEUTROPHILS # BLD AUTO: 8.63 K/UL — HIGH (ref 1.8–7.4)
NEUTROPHILS NFR BLD AUTO: 66.4 % — SIGNIFICANT CHANGE UP (ref 43–77)
NRBC # BLD AUTO: 0 K/UL — SIGNIFICANT CHANGE UP (ref 0–0)
NRBC # FLD: 0 K/UL — SIGNIFICANT CHANGE UP (ref 0–0)
NRBC BLD AUTO-RTO: 0 /100 WBCS — SIGNIFICANT CHANGE UP (ref 0–0)
PLATELET # BLD AUTO: 389 K/UL — SIGNIFICANT CHANGE UP (ref 150–400)
POTASSIUM SERPL-MCNC: 4.1 MMOL/L — SIGNIFICANT CHANGE UP (ref 3.5–5.3)
POTASSIUM SERPL-MCNC: SIGNIFICANT CHANGE UP MMOL/L (ref 3.5–5.3)
POTASSIUM SERPL-SCNC: 4.1 MMOL/L — SIGNIFICANT CHANGE UP (ref 3.5–5.3)
POTASSIUM SERPL-SCNC: SIGNIFICANT CHANGE UP MMOL/L (ref 3.5–5.3)
PROT SERPL-MCNC: 7.3 G/DL — SIGNIFICANT CHANGE UP (ref 6–8.3)
PROT SERPL-MCNC: SIGNIFICANT CHANGE UP G/DL (ref 6–8.3)
RBC # BLD: 4.01 M/UL — SIGNIFICANT CHANGE UP (ref 3.8–5.2)
RBC # FLD: 13.2 % — SIGNIFICANT CHANGE UP (ref 10.3–14.5)
SODIUM SERPL-SCNC: 125 MMOL/L — LOW (ref 135–145)
SODIUM SERPL-SCNC: 137 MMOL/L — SIGNIFICANT CHANGE UP (ref 135–145)
WBC # BLD: 12.99 K/UL — HIGH (ref 3.8–10.5)
WBC # FLD AUTO: 12.99 K/UL — HIGH (ref 3.8–10.5)

## 2025-04-27 PROCEDURE — 99285 EMERGENCY DEPT VISIT HI MDM: CPT | Mod: FS

## 2025-04-27 PROCEDURE — 73630 X-RAY EXAM OF FOOT: CPT | Mod: 26,RT

## 2025-04-27 RX ORDER — HYDROMORPHONE/SOD CHLOR,ISO/PF 2 MG/10 ML
1 SYRINGE (ML) INJECTION ONCE
Refills: 0 | Status: DISCONTINUED | OUTPATIENT
Start: 2025-04-27 | End: 2025-04-27

## 2025-04-27 RX ORDER — AMOXICILLIN AND CLAVULANATE POTASSIUM 500; 125 MG/1; MG/1
1 TABLET, FILM COATED ORAL
Qty: 28 | Refills: 0
Start: 2025-04-27 | End: 2025-05-10

## 2025-04-27 RX ORDER — AMOXICILLIN AND CLAVULANATE POTASSIUM 500; 125 MG/1; MG/1
1 TABLET, FILM COATED ORAL ONCE
Refills: 0 | Status: COMPLETED | OUTPATIENT
Start: 2025-04-27 | End: 2025-04-27

## 2025-04-27 RX ADMIN — Medication 1 MILLIGRAM(S): at 09:52

## 2025-04-27 RX ADMIN — AMOXICILLIN AND CLAVULANATE POTASSIUM 1 TABLET(S): 500; 125 TABLET, FILM COATED ORAL at 13:01

## 2025-04-27 RX ADMIN — Medication 1 MILLIGRAM(S): at 11:59

## 2025-04-27 RX ADMIN — Medication 1 MILLIGRAM(S): at 11:29

## 2025-04-27 RX ADMIN — Medication 1 MILLIGRAM(S): at 13:01

## 2025-04-27 RX ADMIN — Medication 1 MILLIGRAM(S): at 10:22

## 2025-04-27 NOTE — ED PROVIDER NOTE - PROGRESS NOTE DETAILS
JACKSON Gregg- per podiatry pt with blister s/p I and D. cellulitis. recommending augmentin for 14 days and outpatient f/u. pt pain controlled. SW to arrange transport home.

## 2025-04-27 NOTE — ED PROVIDER NOTE - ATTENDING APP SHARED VISIT CONTRIBUTION OF CARE
55-year-old female with past medical history of obesity, asthma, CHF, hypertension, neuropathy, diabetes, complex pain syndrome with IT Dilaudid pump follows with pain management p/w acute atraumatic R heel pain and swelling x2 days. On exam, there is swelling to heel w/ surrounding erythema c/f abscess. WWP, some reactive edema to foot and ankle but able to range ankle. Podiatry consulted

## 2025-04-27 NOTE — ED PROVIDER NOTE - MUSCULOSKELETAL, MLM
Stable
Spine appears normal, range of motion is not limited, no muscle or joint tenderness. Right heel swelling and erythema, possible early abscess vs callus.

## 2025-04-27 NOTE — ED PROVIDER NOTE - NSICDXPASTSURGICALHX_GEN_ALL_CORE_FT
Neurosurgery consult visit:    57yo F here for evaluation of chronic severe right lower posterior neck pain. Pain has been ongoing since a month after her original C4-6 laminoplasty in 2016 by Dr Valdez. She has since had C4-5 foraminotomy in 2017 with Dr Barclay at [facility], C5-6 anterior fusion by Dr Lopez in 2018, and C3-4 and C6-7 anterior fusion by Dr Retana in Mar 2019. She has had PT, LPs without any positive results, cervical ESIs, medial branch blocks, radiofrequency ablations without any relief. She is due to have her first botox injection tomorrow. Patient is neurologically intact at time of visit. 80% ROM of neck all planes. Positive tenderness to paraspinal musculature on the R near C5-6. Anterior and posterior incisions well-healed. MRI cervical spine shows good decompression of spinal cord, but increasing cervical kyphosis when compared to prior imaging concerning for possible developing sagittal imbalance.    Plan: referred to see Dr Loera at [facility] for spinal deformity consult. Proceed with botox injection tomorrow.    Patient also seen by Dr Granger at time of visit  Length of visit >40 mins, over 50% spent in conseling.    Jenny Floyd FNP-C  [facility]  O. 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

## 2025-04-27 NOTE — ED ADULT TRIAGE NOTE - CHIEF COMPLAINT QUOTE
atraumatic R ankle swelling since last night- unable to ambulate @ baseline- hx DMT2 neuropathy  *CGM to LUE- sugar reads 200*

## 2025-04-27 NOTE — PROVIDER CONTACT NOTE (OTHER) - ASSESSMENT
Medical team referred this case as pt needs a ride home. Case was discussed with the medical team and wants BLS.  Writer called [facility] [phone]  spoke with Ms. Jeannette    to set up ambulance service and pt will be picked up around 3:00 PM  by [facility] trip # [phone]. Non Emergent ambulance form has been signed by MD and attached to the pts envelope for EMS. Medical team and pt was notified  time. No further SW intervention needed for this visit.

## 2025-04-27 NOTE — ED PROVIDER NOTE - PATIENT PORTAL LINK FT
You can access the FollowMyHealth Patient Portal offered by [facility] by registering at the following website: [email or web address]. By joining [employer]’s FollowMyHealth portal, you will also be able to view your health information using other applications (apps) compatible with our system.

## 2025-04-27 NOTE — ED CLERICAL - NS ED CLERK NOTE PRE-ARRIVAL INFORMATION; ADDITIONAL PRE-ARRIVAL INFORMATION

## 2025-04-27 NOTE — ED PROVIDER NOTE - NSICDXPASTMEDICALHX_GEN_ALL_CORE_FT
PAST MEDICAL HISTORY:  Asthma     Complex regional pain syndrome type 1     Diabetes     Fibroids     Former cigarette smoker (smoked x 45 years; quit ~2014)    History of CHF (congestive heart failure)     HTN (hypertension)     IBS (irritable bowel syndrome)     Idiopathic intracranial hypertension     Intercostal neuralgia     Localized swelling, mass and lump, head     Marijuana smoker, continuous (states smokes 3 - 4 times per day for pain management reasons)    Migraine     Neuropathy     Obesity      S Plasty Text: Given the location and shape of the defect, and the orientation of relaxed skin tension lines, an S-plasty was deemed most appropriate for repair.  Using a sterile surgical marker, the appropriate outline of the S-plasty was drawn, incorporating the defect and placing the expected incisions within the relaxed skin tension lines where possible.  The area thus outlined was incised deep to adipose tissue with a #15 scalpel blade.  The skin margins were undermined to an appropriate distance in all directions utilizing iris scissors. The skin flaps were advanced over the defect.  The opposing margins were then approximated with interrupted buried subcutaneous sutures.

## 2025-04-27 NOTE — CONSULT NOTE ADULT - ASSESSMENT
55F presents for right heel blister  -Pt was seen and evaluated  -Afebrile, WBC 12.99, ESR 29, CRP 1.1  -Right foot heel blister with surrounding erythema, no malodor. Left foot no open wounds, no acute signs of infection  -Right foot X-ray read: no gas, no OM  -After verbal consent, the right heel blister was deroofed using a sterile #15 blade and suture removal kit to the level of and not beyond dermis. Wound to dermis with serous drainage. Wound dressed with diliuted betadine gauze, 4x4 gauze, abd pad, kerlex and ACE. Pt tolerated procedure well.  -No wound culture obtained 2/2 no culturable material  -Recommend augmentin for 14 days  -Recommend mupirocin, local wound care with mupirocin, 4x4 gauze, abd pad, kerlex, and ACE daily  -Pt is stable for discharge from podiatry standpoint  -Follow up with Dr Hyman within 1 week of discharge  -Please call [phone]  -Discussed with attending

## 2025-04-27 NOTE — CONSULT NOTE ADULT - SUBJECTIVE AND OBJECTIVE BOX
Patient is a 55y old  Female who presents with a chief complaint of right foot heel pain    HPI:  55-year-old female with past medical history of obesity, asthma, CHF, hypertension, neuropathy, diabetes, complex pain syndrome with intercostal Dilaudid pump follows with pain management, presents to ED complaining of right heel pain.  States she got out of bed yesterday and felt severe sharp pain in her right heel when she stood up causing her to fall.  Denies any injuries head trauma or loss of consciousness.  States she takes care of her feet and checks them for any ulcers because she has a family history of amputation.  Has chronic neuropathy although feels heel pain no weakness tingling calf pain.  Associated with redness and swelling.  No fevers or chills.      PAST MEDICAL & SURGICAL HISTORY:  HTN (hypertension)      Neuropathy      Obesity      Former cigarette smoker  (smoked x 45 years; quit ~2014)      Marijuana smoker, continuous  (states smokes 3 - 4 times per day for pain management reasons)      Diabetes      IBS (irritable bowel syndrome)      Complex regional pain syndrome type 1      Intercostal neuralgia      Localized swelling, mass and lump, head      Asthma      History of CHF (congestive heart failure)      Fibroids      Idiopathic intracranial hypertension      Migraine      History of cholecystectomy      History of hand surgery  (pt states she had 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12)      Spinal cord neurostimulator device in situ  3/2023      History of removal of cyst  left hand at age 12      History of surgery  IT pump placed 11/22/24          MEDICATIONS  (STANDING):  amoxicillin  875 milliGRAM(s)/clavulanate 1 Tablet(s) Oral Once    MEDICATIONS  (PRN):      Allergies    amitriptyline (Other)  duloxetine (Other)    Intolerances        VITALS:    Vital Signs Last 24 Hrs  T(C): 36.8 (27 Apr 2025 09:03), Max: 36.8 (27 Apr 2025 09:03)  T(F): 98.2 (27 Apr 2025 09:03), Max: 98.2 (27 Apr 2025 09:03)  HR: 60 (27 Apr 2025 09:03) (60 - 60)  BP: 121/69 (27 Apr 2025 09:03) (121/69 - 121/69)  BP(mean): --  RR: 20 (27 Apr 2025 09:03) (20 - 20)  SpO2: 98% (27 Apr 2025 09:03) (98% - 98%)    Parameters below as of 27 Apr 2025 09:03  Patient On (Oxygen Delivery Method): room air        LABS:                          11.7   12.99 )-----------( 389      ( 27 Apr 2025 09:41 )             36.1       04-27    137  |  101  |  20  ----------------------------<  176[H]  4.1   |  24  |  0.95    Ca    8.1[L]      27 Apr 2025 10:50    TPro  7.3  /  Alb  3.5  /  TBili  0.3  /  DBili  x   /  AST  14  /  ALT  14  /  AlkPhos  122[H]  04-27      CAPILLARY BLOOD GLUCOSE              LOWER EXTREMITY PHYSICAL EXAM:    Vascular: DP/PT 1/4, B/L, CFT <3 seconds B/L, Temperature gradient warm to cool, B/L.   Neuro: Epicritic sensation diminished to the level of digits, B/L.  Musculoskeletal/Ortho: unremarkable  Skin: Left foot heel blister with surrounding erythema, no malodor. Right foot no open wounds, no acute signs of infection      RADIOLOGY & ADDITIONAL STUDIES:  < from: Xray Foot AP + Lateral + Oblique, Right (04.27.25 @ 10:05) >    ACC: [id number] EXAM:  XR FOOT COMP MIN 3 VIEWS RT   ORDERED BY: BETI RODRIGUEZ     PROCEDURE DATE:  04/27/2025          INTERPRETATION:  CLINICAL INDICATION: Right foot swelling.    TECHNIQUE: 3 views of the right foot.    COMPARISON: Radiograph of the right foot performed on 5/28/2024.    FINDINGS/  IMPRESSION:    Calcaneal enthesophytes. No acute fracture or dislocation.  Diffuse subcutaneous edema and swelling about the foot but no   subcutaneous air or bone destruction to suggest osteomyelitis.    --- End of Report ---          BELLA DICKINSON MD; Resident Radiologist  This document has been electronically signed.  RAHUL MOREL MD; Attending Radiologist  This document has been electronically signed. Apr 27 2025 10:22AM    < end of copied text >

## 2025-04-27 NOTE — ED PROVIDER NOTE - NSFOLLOWUPINSTRUCTIONS_ED_ALL_ED_FT
Follow up with Podiatry Dr. Brock  Call [phone].    Follow up with your Pain Management doctor.    Take Augmentin antibiotic for 14 days.    Worsening, continued or new concerning symptoms return to the emergency department.

## 2025-04-27 NOTE — ED ADULT NURSE NOTE - OBJECTIVE STATEMENT
Received patient in room 26 c/o right foot swelling, redness, pain, difficulty walking since yesterday. HX DM, HTN, Neuropathy. Patient denies SOB, chest pain, fever. Patient is A&OX4, airway patent, breathing unlabored and even, radial pulses palpable. Labs obtained, 22G IV placed on left arm, medication given as ordered, awaiting X-ray, Podiatry consult. Side rails up and safety maintained. Fall precaution in place. Call bells within reach. Received patient in room 26 c/o right foot pain w/swelling, redness, difficulty walking since yesterday. HX DM, HTN, Neuropathy. Patient denies SOB, chest pain, fever. Patient is A&OX4, airway patent, breathing unlabored and even, radial pulses palpable. Labs obtained, 22G IV placed on left arm, medication given as ordered, awaiting X-ray, Podiatry consult. Side rails up and safety maintained. Fall precaution in place. Call bells within reach.

## 2025-04-27 NOTE — ED PROVIDER NOTE - CARE PLAN
1 Principal Discharge DX:	Cellulitis  Secondary Diagnosis:	Infected blister of right foot, initial encounter

## 2025-04-27 NOTE — ED ADULT NURSE REASSESSMENT NOTE - NS ED NURSE REASSESS COMMENT FT1
Break RN: Podiatry at bedside performing procedure. pt respirations even and unlabored. plan of care ongoing.

## 2025-04-27 NOTE — ED PROVIDER NOTE - CLINICAL SUMMARY MEDICAL DECISION MAKING FREE TEXT BOX
55-year-old female with past medical history of obesity, asthma, CHF, hypertension, neuropathy, diabetes, complex pain syndrome with intercostal Dilaudid pump follows with pain management, presents to ED complaining of right heel pain.  States she got out of bed yesterday and felt severe sharp pain in her right heel when she stood up causing her to fall.  Denies any injuries head trauma or loss of consciousness. on exam pt with R heel swelling concerning for cellulitis possible early abscess vs callus. no ulcer. plan to check labs including ESR, CRP, xray, pain control w/ IV dilaudid, consult podiatry. 55-year-old female with past medical history of obesity, asthma, CHF, hypertension, neuropathy, diabetes, complex pain syndrome with IT Dilaudid pump follows with pain management, presents to ED complaining of right heel pain.  States she got out of bed yesterday and felt severe sharp pain in her right heel when she stood up causing her to fall.  Denies any injuries head trauma or loss of consciousness. on exam pt with R heel swelling concerning for cellulitis possible early abscess vs callus. no ulcer. plan to check labs including ESR, CRP, xray, pain control w/ IV dilaudid, consult podiatry.

## 2025-04-27 NOTE — ED PROVIDER NOTE - OBJECTIVE STATEMENT
55-year-old female with past medical history of obesity, asthma, CHF, hypertension, neuropathy, diabetes, complex pain syndrome with intercostal Dilaudid pump follows with pain management, presents to ED complaining of right heel pain.  States she got out of bed yesterday and felt severe sharp pain in her right heel when she stood up causing her to fall.  Denies any injuries head trauma or loss of consciousness.  States she takes care of her feet and checks them for any ulcers because she has a family history of amputation.  Has chronic neuropathy although feels heel pain no weakness tingling calf pain.  Associated with redness and swelling.  No fevers or chills.

## 2025-04-27 NOTE — ED ADULT NURSE NOTE - NSFALLHARMRISKINTERV_ED_ALL_ED

## 2025-04-28 ENCOUNTER — NON-APPOINTMENT (OUTPATIENT)
Age: 55
End: 2025-04-28

## 2025-04-28 ENCOUNTER — APPOINTMENT (OUTPATIENT)
Dept: HOME HEALTH SERVICES | Facility: HOME HEALTH | Age: 55
End: 2025-04-28

## 2025-04-28 RX ORDER — AMOXICILLIN AND CLAVULANATE POTASSIUM 875; 125 MG/1; MG/1
875-125 TABLET, COATED ORAL
Qty: 28 | Refills: 1 | Status: ACTIVE | COMMUNITY
Start: 2025-04-28

## 2025-04-29 RX ORDER — MUPIROCIN CALCIUM 20 MG/G
1 CREAM TOPICAL
Qty: 1 | Refills: 0
Start: 2025-04-29 | End: 2025-05-05

## 2025-04-29 NOTE — ED POST DISCHARGE NOTE - ADDITIONAL DOCUMENTATION
JACKSON Albert: Pt called, states cream was not sent to her pharmacy, sent rx for Mupirocin. Pt states she was also discharged without wound care supplies. Senr rx for supplies as listed in podiatry consult note.

## 2025-05-04 ENCOUNTER — EMERGENCY (EMERGENCY)
Facility: HOSPITAL | Age: 55
LOS: 1 days | End: 2025-05-04
Attending: EMERGENCY MEDICINE | Admitting: EMERGENCY MEDICINE
Payer: MEDICARE

## 2025-05-04 VITALS
DIASTOLIC BLOOD PRESSURE: 77 MMHG | RESPIRATION RATE: 16 BRPM | TEMPERATURE: 98 F | SYSTOLIC BLOOD PRESSURE: 116 MMHG | WEIGHT: 293 LBS | HEIGHT: 68 IN | OXYGEN SATURATION: 95 % | HEART RATE: 61 BPM

## 2025-05-04 VITALS
RESPIRATION RATE: 18 BRPM | TEMPERATURE: 98 F | OXYGEN SATURATION: 100 % | SYSTOLIC BLOOD PRESSURE: 102 MMHG | HEART RATE: 72 BPM | DIASTOLIC BLOOD PRESSURE: 38 MMHG

## 2025-05-04 DIAGNOSIS — Z98.890 OTHER SPECIFIED POSTPROCEDURAL STATES: Chronic | ICD-10-CM

## 2025-05-04 DIAGNOSIS — Z90.49 ACQUIRED ABSENCE OF OTHER SPECIFIED PARTS OF DIGESTIVE TRACT: Chronic | ICD-10-CM

## 2025-05-04 DIAGNOSIS — Z96.82 PRESENCE OF NEUROSTIMULATOR: Chronic | ICD-10-CM

## 2025-05-04 LAB
ANION GAP SERPL CALC-SCNC: 15 MMOL/L — HIGH (ref 7–14)
BUN SERPL-MCNC: 17 MG/DL — SIGNIFICANT CHANGE UP (ref 7–23)
CALCIUM SERPL-MCNC: 8 MG/DL — LOW (ref 8.4–10.5)
CHLORIDE SERPL-SCNC: 100 MMOL/L — SIGNIFICANT CHANGE UP (ref 98–107)
CO2 SERPL-SCNC: 23 MMOL/L — SIGNIFICANT CHANGE UP (ref 22–31)
CREAT SERPL-MCNC: 0.9 MG/DL — SIGNIFICANT CHANGE UP (ref 0.5–1.3)
CRP SERPL-MCNC: 9.6 MG/L — HIGH
EGFR: 76 ML/MIN/1.73M2 — SIGNIFICANT CHANGE UP
EGFR: 76 ML/MIN/1.73M2 — SIGNIFICANT CHANGE UP
ERYTHROCYTE [SEDIMENTATION RATE] IN BLOOD: 42 MM/HR — HIGH (ref 4–25)
GLUCOSE SERPL-MCNC: 99 MG/DL — SIGNIFICANT CHANGE UP (ref 70–99)
HCT VFR BLD CALC: 33.1 % — LOW (ref 34.5–45)
HGB BLD-MCNC: 11.1 G/DL — LOW (ref 11.5–15.5)
MAGNESIUM SERPL-MCNC: 0.9 MG/DL — CRITICAL LOW (ref 1.6–2.6)
MCHC RBC-ENTMCNC: 29.3 PG — SIGNIFICANT CHANGE UP (ref 27–34)
MCHC RBC-ENTMCNC: 33.5 G/DL — SIGNIFICANT CHANGE UP (ref 32–36)
MCV RBC AUTO: 87.3 FL — SIGNIFICANT CHANGE UP (ref 80–100)
NRBC # BLD AUTO: 0 K/UL — SIGNIFICANT CHANGE UP (ref 0–0)
NRBC # FLD: 0 K/UL — SIGNIFICANT CHANGE UP (ref 0–0)
NRBC BLD AUTO-RTO: 0 /100 WBCS — SIGNIFICANT CHANGE UP (ref 0–0)
PHOSPHATE SERPL-MCNC: 4.5 MG/DL — SIGNIFICANT CHANGE UP (ref 2.5–4.5)
PLATELET # BLD AUTO: 439 K/UL — HIGH (ref 150–400)
POTASSIUM SERPL-MCNC: 3.8 MMOL/L — SIGNIFICANT CHANGE UP (ref 3.5–5.3)
POTASSIUM SERPL-SCNC: 3.8 MMOL/L — SIGNIFICANT CHANGE UP (ref 3.5–5.3)
RBC # BLD: 3.79 M/UL — LOW (ref 3.8–5.2)
RBC # FLD: 13 % — SIGNIFICANT CHANGE UP (ref 10.3–14.5)
SODIUM SERPL-SCNC: 138 MMOL/L — SIGNIFICANT CHANGE UP (ref 135–145)
WBC # BLD: 12.14 K/UL — HIGH (ref 3.8–10.5)
WBC # FLD AUTO: 12.14 K/UL — HIGH (ref 3.8–10.5)

## 2025-05-04 PROCEDURE — 73630 X-RAY EXAM OF FOOT: CPT | Mod: 26,RT

## 2025-05-04 PROCEDURE — 99285 EMERGENCY DEPT VISIT HI MDM: CPT | Mod: GC

## 2025-05-04 RX ORDER — ONDANSETRON HCL/PF 4 MG/2 ML
4 VIAL (ML) INJECTION ONCE
Refills: 0 | Status: COMPLETED | OUTPATIENT
Start: 2025-05-04 | End: 2025-05-04

## 2025-05-04 RX ORDER — CADEXOMER IODINE 0.9 %
1 PADS, MEDICATED (EA) TOPICAL DAILY
Refills: 0 | Status: ACTIVE | OUTPATIENT
Start: 2025-05-04 | End: 2026-04-02

## 2025-05-04 RX ORDER — ONDANSETRON HCL/PF 4 MG/2 ML
4 VIAL (ML) INJECTION ONCE
Refills: 0 | Status: DISCONTINUED | OUTPATIENT
Start: 2025-05-04 | End: 2025-05-04

## 2025-05-04 RX ADMIN — Medication 15 MILLIGRAM(S): at 18:48

## 2025-05-04 RX ADMIN — Medication 4 MILLIGRAM(S): at 18:49

## 2025-05-04 RX ADMIN — Medication 1 APPLICATION(S): at 17:59

## 2025-05-04 RX ADMIN — Medication 4 MILLIGRAM(S): at 15:42

## 2025-05-04 RX ADMIN — Medication 15 MILLIGRAM(S): at 14:53

## 2025-05-04 NOTE — ED PROVIDER NOTE - NSFOLLOWUPINSTRUCTIONS_ED_ALL_ED_FT
The results of any blood tests and imaging studies completed during your visit today were discussed and explained to you and a copy provided with your discharge instructions.     Please follow up with:   Dr. Tam at [address] [place] [address] Ave within 1 week of discharge, please call [phone] for appt.     - local wound care with Iodosorb, 4x4 gauze, abd pad, kerlex, and ACE daily  -weightbear as tolerated to the right foot in surgical shoe. Counter of surgical shoe was removed for offloading purposes.

## 2025-05-04 NOTE — PROVIDER CONTACT NOTE (OTHER) - ASSESSMENT
Medical team referred this case as pt needs a ride home. Case was discussed with the medical team and wants BLS since pt has difficulty walking.  Writer called [facility] [phone]  spoke with MsDejuan Celia    to set up ambulance service and pt will be picked up around 9:00 PM  by [facility] trip # [phone]. Non Emergent ambulance form has been signed by MD and attached to the pts envelope for EMS. Medical team and pt was notified  time. No further SW intervention needed for this visit.

## 2025-05-04 NOTE — ED ADULT NURSE NOTE - NSFALLRISKINTERV_ED_ALL_ED

## 2025-05-04 NOTE — ED PROVIDER NOTE - ATTENDING CONTRIBUTION TO CARE
55-year-old female with past medical history of obesity, asthma, CHF, hypertension, neuropathy, diabetes, complex pain syndrome with intercostal Dilaudid pump presents to ED for right heel pain status post deroofing by podiatry in the ED on April 27, 2025.  Patient has been compliant on her prescribed Augmentin and has been wound dressing every other day.  Denies any fevers, no chills, no bodyaches, no nausea, no vomiting.

## 2025-05-04 NOTE — CONSULT NOTE ADULT - ASSESSMENT
55F presents for right heel pain  -Pt was seen and evaluated  -Afebrile, WBC 12.14, CRP 0.96  -Right foot heel wound to dermis, no erythema, no drainage, no malodor, no fluctuance. Left foot no open wounds, no acute signs of infection  -Right foot X-ray read: no gas, no OM  -No wound culture obtained 2/2 no culturable material  -Recommend finish course of Augmentin for antibiotic stewardship  -Ordered Iodosorb, local wound care with Iodosorb, 4x4 gauze, abd pad, kerlex, and ACE daily  -Pt to weightbear as tolerated to the right foot in surgical shoe  -Pt is stable for discharge from podiatry standpoint  -Follow up with Dr. Tam at [facility] within 1 week of discharge, please call [phone] for appt  -Discussed with attending   55F presents for right heel pain  -Pt was seen and evaluated  -Afebrile, WBC 12.14, CRP 0.96  -Right foot heel wound to dermis, no erythema, no drainage, no malodor, no fluctuance. Left foot no open wounds, no acute signs of infection  -Right foot X-ray read: no gas, no OM  -No wound culture obtained 2/2 no culturable material  -Recommend finish course of Augmentin for antibiotic stewardship  -Ordered Iodosorb, local wound care with Iodosorb, 4x4 gauze, abd pad, kerlex, and ACE daily  -Pt to weightbear as tolerated to the right foot in surgical shoe  -Ordered Z-flow to be worn while in bed  -Pt is stable for discharge from podiatry standpoint  -Follow up with [facility] within 1 week of discharge, please call [phone] for appt  -Discussed with attending   55F presents for right heel pain  -Pt was seen and evaluated  -Afebrile, WBC 12.14, CRP 0.96  -Right foot heel wound to dermis, no erythema, no drainage, no malodor, no fluctuance. Left foot no open wounds, no acute signs of infection  -Right foot X-ray read: no gas, no OM  -No wound culture obtained 2/2 no culturable material  -Recommend finish course of Augmentin for antibiotic stewardship  -Ordered Iodosorb, local wound care with Iodosorb, 4x4 gauze, abd pad, kerlex, and ACE daily  -Pt to weightbear as tolerated to the right foot in surgical shoe. Counter of surgical shoe was removed for offloading purposes  -Ordered Z-flow to be worn while in bed  -Pt is stable for discharge from podiatry standpoint  -Follow up with Dr. Tam at [facility] within 1 week of discharge, please call [phone] for appt  -Discussed with attending

## 2025-05-04 NOTE — ED PROVIDER NOTE - CLINICAL SUMMARY MEDICAL DECISION MAKING FREE TEXT BOX
Pt is afebrile, well appearing, not septic, vitals nonactionable. Wound appears very clean, no signs of infection, pt is only here for pain. will give pain control in ED, right foot xray and podiatry consult pending.

## 2025-05-04 NOTE — ED PROVIDER NOTE - PROGRESS NOTE DETAILS
lum do pgy-2: podiatry does not believe wound is infected, pt's vitals stable, will d/c home with boot and wound care instructions, recommended pt f/u with podiatry, gave number and address in d/c instructions. Pt understands and agrees with  plan. Rehan Solo DO (ED Attending):     Patient was signed out to me pending podiatry evaluation.  Podiatry evaluated patient, they do not suspect infectious etiology.  Recommends patient finish her Augmentin course.  Iodosorb was placed.  Blood work was nonactionable.  Upon my assessment patient stated that the 50 mg of oral morphine did help her pain.  She is requesting another dose.    Patient states that she will follow-up with outpatient podiatry.  Will discharge.

## 2025-05-04 NOTE — ED PROVIDER NOTE - OBJECTIVE STATEMENT
55-year-old female with past medical history of obesity, asthma, CHF, hypertension, neuropathy, diabetes, complex pain syndrome with intercostal Dilaudid pump presents to ED 55-year-old female with past medical history of obesity, asthma, CHF, hypertension, neuropathy, diabetes, complex pain syndrome with intercostal Dilaudid pump presents to ED for right heel pain status post deroofing by podiatry in the ED on April 27, 2025.  Patient has been compliant on her prescribed Augmentin and has been wound dressing every other day.  Denies any fevers, no chills, no bodyaches, no nausea, no vomiting.

## 2025-05-04 NOTE — ED CLERICAL - NS ED CLERK NOTE PRE-ARRIVAL INFORMATION; ADDITIONAL PRE-ARRIVAL INFORMATION

## 2025-05-04 NOTE — ED ADULT TRIAGE NOTE - CHIEF COMPLAINT QUOTE
brought in by EMS from home for cellulitis of right heel. MD has removed skin from heel recently. Pain and wound worsening. Hx DM2, HTN, intercostal neurologia, complex regional pain syndrome type 2, CHF.

## 2025-05-04 NOTE — ED ADULT NURSE REASSESSMENT NOTE - NS ED NURSE REASSESS COMMENT FT1
break coverage RN. received report from Primary RN, lying in stretcher GCS 15 pt A&Ox4  vitally stable. endorses nausea ongoing since arrival , NAD noted, breathing even and unlabored. pending XR results and further dispo

## 2025-05-04 NOTE — CONSULT NOTE ADULT - SUBJECTIVE AND OBJECTIVE BOX
Patient is a 55y old  Female who presents with a chief complaint of right heel pain    HPI: 55-year-old female with past medical history of obesity, asthma, CHF, hypertension, neuropathy, diabetes, complex pain syndrome with intercostal Dilaudid pump presents to ED for right heel pain status post deroofing by podiatry in the ED on April 27, 2025.  Patient has been compliant on her prescribed Augmentin and has been wound dressing every other day.  Denies any fevers, no chills, no bodyaches, no nausea, no vomiting.      PAST MEDICAL & SURGICAL HISTORY:  HTN (hypertension)      Neuropathy      Obesity      Former cigarette smoker  (smoked x 45 years; quit ~2014)      Marijuana smoker, continuous  (states smokes 3 - 4 times per day for pain management reasons)      Diabetes      IBS (irritable bowel syndrome)      Complex regional pain syndrome type 1      Intercostal neuralgia      Localized swelling, mass and lump, head      Asthma      History of CHF (congestive heart failure)      Fibroids      Idiopathic intracranial hypertension      Migraine      History of cholecystectomy      History of hand surgery  (pt states she had 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12)      Spinal cord neurostimulator device in situ  3/2023      History of removal of cyst  left hand at age 12      History of surgery  IT pump placed 11/22/24          MEDICATIONS  (STANDING):    MEDICATIONS  (PRN):      Allergies    duloxetine (Other)  amitriptyline (Other)    Intolerances        VITALS:    Vital Signs Last 24 Hrs  T(C): 36.9 (04 May 2025 15:37), Max: 36.9 (04 May 2025 15:37)  T(F): 98.5 (04 May 2025 15:37), Max: 98.5 (04 May 2025 15:37)  HR: 67 (04 May 2025 15:37) (61 - 67)  BP: 115/59 (04 May 2025 15:37) (110/62 - 116/77)  BP(mean): --  RR: 16 (04 May 2025 15:37) (16 - 20)  SpO2: 97% (04 May 2025 15:37) (95% - 98%)    Parameters below as of 04 May 2025 15:37  Patient On (Oxygen Delivery Method): room air        LABS:                          11.1   12.14 )-----------( 439      ( 04 May 2025 15:48 )             33.1       05-04    138  |  100  |  17  ----------------------------<  99  3.8   |  23  |  0.90    Ca    8.0[L]      04 May 2025 15:48  Phos  4.5     05-04  Mg     0.90     05-04        CAPILLARY BLOOD GLUCOSE      POCT Blood Glucose.: 115 mg/dL (04 May 2025 12:30)          LOWER EXTREMITY PHYSICAL EXAM:    Vascular: DP/PT 1/4, B/L, CFT <3 seconds B/L, Temperature gradient warm to cool, B/L.   Neuro: Epicritic sensation diminished to the level of digits, B/L.  Musculoskeletal/Ortho: pain on palpation to the right medial plantar tubercle, no deformities   Skin: Right foot heel wound to dermis, no erythema, no drainage, no malodor, no fluctuance. Left foot no open wounds, no acute signs of infection    RADIOLOGY & ADDITIONAL STUDIES:

## 2025-05-04 NOTE — ED PROVIDER NOTE - PHYSICAL EXAMINATION
GENERAL: NAD  HEENT:  Atraumatic  CHEST/LUNG: Chest rise equal bilaterally  HEART: Regular rate and rhythm  EXTREMITIES:  Extremities warm  PSYCH: A&Ox3  SKIN:  Right foot: skin off on right heel. DP pulse intact. wound is clean, no drainage, no signs of infection.

## 2025-05-04 NOTE — ED ADULT NURSE NOTE - OBJECTIVE STATEMENT
Patient A&o X4, history of  DM2, HTN, intercostal neurologia, complex regional pain syndrome type 2, CHF, received in room 13, with complaints of right heel pain. Patient states, "I was here last week Sunday and I was diagnosed with cellulitis and the doctor removed the skin off my heel". Patient reports that she has been on antibiotics since and cleans wound every other day. Wound noted to be pink with appropriate edges, appears to be healing well. Patient endorses intermittent numbness ad tingling in foot. Patient able to speak in clear sentences, respirations equal and unlabored. Lung sounds clear b/l, equal chest rise and fall noted. Denies CP/SOB, fever, chills, nausea, vomiting and diarrhea at this time. Skin warm and dry. Provider at bedside for eval, pending further orders.

## 2025-05-04 NOTE — ED PROVIDER NOTE - PATIENT PORTAL LINK FT
Lm for pt to call back to reschedule appt. appt needs to be with Dr Stephon Fisher. You can access the FollowMyHealth Patient Portal offered by [facility] by registering at the following website: [email or web address]. By joining [employer]’s FollowMyHealth portal, you will also be able to view your health information using other applications (apps) compatible with our system.

## 2025-05-04 NOTE — ED ADULT NURSE NOTE - NS ED NURSE RECORD ANOTHER HT AND WT
The following letter pertains to your most recent diagnostic tests:    Good news! The carotid arteries look healthy for you.  Carotid artery blockage was not the likely source for your stroke.        Sincerely,    Dr. Fonseca No

## 2025-05-10 ENCOUNTER — NON-APPOINTMENT (OUTPATIENT)
Age: 55
End: 2025-05-10

## 2025-05-18 NOTE — DISCUSSION/SUMMARY
[de-identified] : ROBIN LÓPEZ is a 54 year-old woman presenting for a RPV for a history of chronic low back pain.   Prior treatment: SCS DRG implantation for intercostal neuralgia ([facility]) Chronic opioid therapy - currently taking hydromorphone 4mg Pregabalin  Gabapentin Lidocaine patches  [place]STOP PDMP was checked and appropriate. Last prescriptions filled: 4/17/2024: oxycodone 5mg #10 tabs 3/26/2024: oxycodone 5mg #30 tabs 3/5/2024: hydromorphone 4mg #20 tabs 2/9/2024: pregabalin 100mg #42 tabs 2/9/2024: hydromorphone 4mg #20 tabs 1/26/2024: oxycodone 20mg #60 tabs  Plan: 1) Patient to obtain CT thoracic spine - she has it scheduled for 3/22 2) Consider ITESI vs ICNB 3) Continue pregabalin 200mg TID; denies AEs 4) Discussed with the patient that I will not prescribe her opioids 5) Continue hydromorphone prn through other provider 6) Continue baclofen 5mg TID prn; denies AEs 7) May consider dorsal column SCS trial 8) RTC 1 week to review CT results
stretcher

## 2025-05-20 ENCOUNTER — APPOINTMENT (OUTPATIENT)
Dept: WOUND CARE | Facility: HOSPITAL | Age: 55
End: 2025-05-20

## 2025-05-20 ENCOUNTER — NON-APPOINTMENT (OUTPATIENT)
Age: 55
End: 2025-05-20

## 2025-05-20 ENCOUNTER — OUTPATIENT (OUTPATIENT)
Dept: OUTPATIENT SERVICES | Facility: HOSPITAL | Age: 55
LOS: 1 days | End: 2025-05-20
Payer: MEDICARE

## 2025-05-20 ENCOUNTER — APPOINTMENT (OUTPATIENT)
Dept: WOUND CARE | Facility: HOSPITAL | Age: 55
End: 2025-05-20
Payer: MEDICARE

## 2025-05-20 DIAGNOSIS — Z90.49 ACQUIRED ABSENCE OF OTHER SPECIFIED PARTS OF DIGESTIVE TRACT: Chronic | ICD-10-CM

## 2025-05-20 DIAGNOSIS — Z98.890 OTHER SPECIFIED POSTPROCEDURAL STATES: Chronic | ICD-10-CM

## 2025-05-20 DIAGNOSIS — Z96.82 PRESENCE OF NEUROSTIMULATOR: Chronic | ICD-10-CM

## 2025-05-20 PROCEDURE — 93922 UPR/L XTREMITY ART 2 LEVELS: CPT

## 2025-05-20 PROCEDURE — 93922 UPR/L XTREMITY ART 2 LEVELS: CPT | Mod: 26

## 2025-06-03 ENCOUNTER — APPOINTMENT (OUTPATIENT)
Dept: WOUND CARE | Facility: HOSPITAL | Age: 55
End: 2025-06-03

## 2025-06-03 VITALS
OXYGEN SATURATION: 94 % | HEART RATE: 65 BPM | DIASTOLIC BLOOD PRESSURE: 66 MMHG | SYSTOLIC BLOOD PRESSURE: 106 MMHG | RESPIRATION RATE: 18 BRPM | TEMPERATURE: 98.3 F

## 2025-06-03 DIAGNOSIS — I77.1 STRICTURE OF ARTERY: ICD-10-CM

## 2025-06-03 DIAGNOSIS — R23.4 CHANGES IN SKIN TEXTURE: ICD-10-CM

## 2025-06-03 PROCEDURE — 99213 OFFICE O/P EST LOW 20 MIN: CPT | Mod: 25

## 2025-06-03 PROCEDURE — 97597 DBRDMT OPN WND 1ST 20 CM/<: CPT

## 2025-06-03 RX ORDER — AMMONIUM LACTATE 12 %
12 CREAM (GRAM) TOPICAL TWICE DAILY
Qty: 3 | Refills: 2 | Status: ACTIVE | COMMUNITY
Start: 2025-06-03 | End: 1900-01-01

## 2025-06-14 ENCOUNTER — TRANSCRIPTION ENCOUNTER (OUTPATIENT)
Age: 55
End: 2025-06-14

## 2025-06-14 ENCOUNTER — INPATIENT (INPATIENT)
Facility: HOSPITAL | Age: 55
LOS: 6 days | Discharge: HOME | DRG: 919 | End: 2025-06-21
Attending: STUDENT IN AN ORGANIZED HEALTH CARE EDUCATION/TRAINING PROGRAM | Admitting: STUDENT IN AN ORGANIZED HEALTH CARE EDUCATION/TRAINING PROGRAM
Payer: MEDICARE

## 2025-06-14 VITALS
SYSTOLIC BLOOD PRESSURE: 131 MMHG | OXYGEN SATURATION: 99 % | TEMPERATURE: 98 F | WEIGHT: 293 LBS | HEART RATE: 57 BPM | DIASTOLIC BLOOD PRESSURE: 58 MMHG | RESPIRATION RATE: 20 BRPM | HEIGHT: 68 IN

## 2025-06-14 DIAGNOSIS — G90.50 COMPLEX REGIONAL PAIN SYNDROME I, UNSPECIFIED: ICD-10-CM

## 2025-06-14 DIAGNOSIS — N18.2 CHRONIC KIDNEY DISEASE, STAGE 2 (MILD): ICD-10-CM

## 2025-06-14 DIAGNOSIS — E11.9 TYPE 2 DIABETES MELLITUS WITHOUT COMPLICATIONS: ICD-10-CM

## 2025-06-14 DIAGNOSIS — F41.9 ANXIETY DISORDER, UNSPECIFIED: ICD-10-CM

## 2025-06-14 DIAGNOSIS — Z98.890 OTHER SPECIFIED POSTPROCEDURAL STATES: Chronic | ICD-10-CM

## 2025-06-14 DIAGNOSIS — E83.42 HYPOMAGNESEMIA: ICD-10-CM

## 2025-06-14 DIAGNOSIS — I10 ESSENTIAL (PRIMARY) HYPERTENSION: ICD-10-CM

## 2025-06-14 DIAGNOSIS — Z90.49 ACQUIRED ABSENCE OF OTHER SPECIFIED PARTS OF DIGESTIVE TRACT: Chronic | ICD-10-CM

## 2025-06-14 DIAGNOSIS — R60.1 GENERALIZED EDEMA: ICD-10-CM

## 2025-06-14 DIAGNOSIS — J45.909 UNSPECIFIED ASTHMA, UNCOMPLICATED: ICD-10-CM

## 2025-06-14 DIAGNOSIS — G56.40 CAUSALGIA OF UNSPECIFIED UPPER LIMB: ICD-10-CM

## 2025-06-14 DIAGNOSIS — Z29.9 ENCOUNTER FOR PROPHYLACTIC MEASURES, UNSPECIFIED: ICD-10-CM

## 2025-06-14 DIAGNOSIS — Z96.82 PRESENCE OF NEUROSTIMULATOR: Chronic | ICD-10-CM

## 2025-06-14 DIAGNOSIS — R06.09 OTHER FORMS OF DYSPNEA: ICD-10-CM

## 2025-06-14 DIAGNOSIS — I50.9 HEART FAILURE, UNSPECIFIED: ICD-10-CM

## 2025-06-14 LAB
ALBUMIN SERPL ELPH-MCNC: 3.2 G/DL — LOW (ref 3.5–5)
ALP SERPL-CCNC: 119 U/L — SIGNIFICANT CHANGE UP (ref 40–120)
ALT FLD-CCNC: 23 U/L DA — SIGNIFICANT CHANGE UP (ref 10–60)
ANION GAP SERPL CALC-SCNC: 7 MMOL/L — SIGNIFICANT CHANGE UP (ref 5–17)
AST SERPL-CCNC: 16 U/L — SIGNIFICANT CHANGE UP (ref 10–40)
BASOPHILS # BLD AUTO: 0.02 K/UL — SIGNIFICANT CHANGE UP (ref 0–0.2)
BASOPHILS NFR BLD AUTO: 0.2 % — SIGNIFICANT CHANGE UP (ref 0–2)
BILIRUB SERPL-MCNC: 0.7 MG/DL — SIGNIFICANT CHANGE UP (ref 0.2–1.2)
BUN SERPL-MCNC: 12 MG/DL — SIGNIFICANT CHANGE UP (ref 7–18)
CALCIUM SERPL-MCNC: 8.7 MG/DL — SIGNIFICANT CHANGE UP (ref 8.4–10.5)
CHLORIDE SERPL-SCNC: 101 MMOL/L — SIGNIFICANT CHANGE UP (ref 96–108)
CO2 SERPL-SCNC: 28 MMOL/L — SIGNIFICANT CHANGE UP (ref 22–31)
CREAT SERPL-MCNC: 1.11 MG/DL — SIGNIFICANT CHANGE UP (ref 0.5–1.3)
EGFR: 59 ML/MIN/1.73M2 — LOW
EGFR: 59 ML/MIN/1.73M2 — LOW
EOSINOPHIL # BLD AUTO: 0.02 K/UL — SIGNIFICANT CHANGE UP (ref 0–0.5)
EOSINOPHIL NFR BLD AUTO: 0.2 % — SIGNIFICANT CHANGE UP (ref 0–6)
GLUCOSE BLDC GLUCOMTR-MCNC: 156 MG/DL — HIGH (ref 70–99)
GLUCOSE BLDC GLUCOMTR-MCNC: 200 MG/DL — HIGH (ref 70–99)
GLUCOSE BLDC GLUCOMTR-MCNC: 206 MG/DL — HIGH (ref 70–99)
GLUCOSE SERPL-MCNC: 203 MG/DL — HIGH (ref 70–99)
HCG SERPL-ACNC: <1 MIU/ML — SIGNIFICANT CHANGE UP
HCT VFR BLD CALC: 34.5 % — SIGNIFICANT CHANGE UP (ref 34.5–45)
HGB BLD-MCNC: 11.4 G/DL — LOW (ref 11.5–15.5)
IMM GRANULOCYTES NFR BLD AUTO: 0.3 % — SIGNIFICANT CHANGE UP (ref 0–0.9)
LYMPHOCYTES # BLD AUTO: 2.5 K/UL — SIGNIFICANT CHANGE UP (ref 1–3.3)
LYMPHOCYTES # BLD AUTO: 21.9 % — SIGNIFICANT CHANGE UP (ref 13–44)
MAGNESIUM SERPL-MCNC: 1.2 MG/DL — LOW (ref 1.6–2.6)
MCHC RBC-ENTMCNC: 28.9 PG — SIGNIFICANT CHANGE UP (ref 27–34)
MCHC RBC-ENTMCNC: 33 G/DL — SIGNIFICANT CHANGE UP (ref 32–36)
MCV RBC AUTO: 87.6 FL — SIGNIFICANT CHANGE UP (ref 80–100)
MONOCYTES # BLD AUTO: 0.42 K/UL — SIGNIFICANT CHANGE UP (ref 0–0.9)
MONOCYTES NFR BLD AUTO: 3.7 % — SIGNIFICANT CHANGE UP (ref 2–14)
NEUTROPHILS # BLD AUTO: 8.44 K/UL — HIGH (ref 1.8–7.4)
NEUTROPHILS NFR BLD AUTO: 73.7 % — SIGNIFICANT CHANGE UP (ref 43–77)
NRBC BLD AUTO-RTO: 0 /100 WBCS — SIGNIFICANT CHANGE UP (ref 0–0)
NT-PROBNP SERPL-SCNC: 167 PG/ML — HIGH (ref 0–125)
PLATELET # BLD AUTO: 386 K/UL — SIGNIFICANT CHANGE UP (ref 150–400)
POTASSIUM SERPL-MCNC: 3.5 MMOL/L — SIGNIFICANT CHANGE UP (ref 3.5–5.3)
POTASSIUM SERPL-SCNC: 3.5 MMOL/L — SIGNIFICANT CHANGE UP (ref 3.5–5.3)
PROT SERPL-MCNC: 7.5 G/DL — SIGNIFICANT CHANGE UP (ref 6–8.3)
RBC # BLD: 3.94 M/UL — SIGNIFICANT CHANGE UP (ref 3.8–5.2)
RBC # FLD: 13.2 % — SIGNIFICANT CHANGE UP (ref 10.3–14.5)
SODIUM SERPL-SCNC: 136 MMOL/L — SIGNIFICANT CHANGE UP (ref 135–145)
TROPONIN I, HIGH SENSITIVITY RESULT: 27 NG/L — SIGNIFICANT CHANGE UP
WBC # BLD: 11.44 K/UL — HIGH (ref 3.8–10.5)
WBC # FLD AUTO: 11.44 K/UL — HIGH (ref 3.8–10.5)

## 2025-06-14 PROCEDURE — 71045 X-RAY EXAM CHEST 1 VIEW: CPT | Mod: 26

## 2025-06-14 PROCEDURE — 99223 1ST HOSP IP/OBS HIGH 75: CPT | Mod: GC

## 2025-06-14 PROCEDURE — 99285 EMERGENCY DEPT VISIT HI MDM: CPT

## 2025-06-14 PROCEDURE — 93010 ELECTROCARDIOGRAM REPORT: CPT

## 2025-06-14 RX ORDER — HYDROMORPHONE/SOD CHLOR,ISO/PF 2 MG/10 ML
2 SYRINGE (ML) INJECTION EVERY 6 HOURS
Refills: 0 | Status: DISCONTINUED | OUTPATIENT
Start: 2025-06-14 | End: 2025-06-16

## 2025-06-14 RX ORDER — DEXTROSE 50 % IN WATER 50 %
15 SYRINGE (ML) INTRAVENOUS ONCE
Refills: 0 | Status: DISCONTINUED | OUTPATIENT
Start: 2025-06-14 | End: 2025-06-21

## 2025-06-14 RX ORDER — MONTELUKAST SODIUM 10 MG/1
10 TABLET ORAL DAILY
Refills: 0 | Status: DISCONTINUED | OUTPATIENT
Start: 2025-06-14 | End: 2025-06-21

## 2025-06-14 RX ORDER — BACLOFEN 10 MG/20ML
5 INJECTION INTRATHECAL
Refills: 0 | Status: DISCONTINUED | OUTPATIENT
Start: 2025-06-14 | End: 2025-06-16

## 2025-06-14 RX ORDER — INSULIN GLARGINE-YFGN 100 [IU]/ML
35 INJECTION, SOLUTION SUBCUTANEOUS AT BEDTIME
Refills: 0 | Status: DISCONTINUED | OUTPATIENT
Start: 2025-06-14 | End: 2025-06-16

## 2025-06-14 RX ORDER — LISINOPRIL 5 MG/1
5 TABLET ORAL DAILY
Refills: 0 | Status: DISCONTINUED | OUTPATIENT
Start: 2025-06-14 | End: 2025-06-21

## 2025-06-14 RX ORDER — SODIUM CHLORIDE 9 G/1000ML
1000 INJECTION, SOLUTION INTRAVENOUS
Refills: 0 | Status: DISCONTINUED | OUTPATIENT
Start: 2025-06-14 | End: 2025-06-21

## 2025-06-14 RX ORDER — MAGNESIUM SULFATE 500 MG/ML
2 SYRINGE (ML) INJECTION ONCE
Refills: 0 | Status: COMPLETED | OUTPATIENT
Start: 2025-06-14 | End: 2025-06-14

## 2025-06-14 RX ORDER — BUMETANIDE 1 MG/1
2 TABLET ORAL
Refills: 0 | Status: DISCONTINUED | OUTPATIENT
Start: 2025-06-14 | End: 2025-06-15

## 2025-06-14 RX ORDER — HEPARIN SODIUM 1000 [USP'U]/ML
5000 INJECTION INTRAVENOUS; SUBCUTANEOUS EVERY 8 HOURS
Refills: 0 | Status: DISCONTINUED | OUTPATIENT
Start: 2025-06-14 | End: 2025-06-21

## 2025-06-14 RX ORDER — INSULIN LISPRO 100 U/ML
INJECTION, SOLUTION INTRAVENOUS; SUBCUTANEOUS
Refills: 0 | Status: DISCONTINUED | OUTPATIENT
Start: 2025-06-14 | End: 2025-06-16

## 2025-06-14 RX ORDER — ACETAMINOPHEN 500 MG/5ML
650 LIQUID (ML) ORAL EVERY 6 HOURS
Refills: 0 | Status: DISCONTINUED | OUTPATIENT
Start: 2025-06-14 | End: 2025-06-16

## 2025-06-14 RX ORDER — PREGABALIN 50 MG/1
100 CAPSULE ORAL THREE TIMES A DAY
Refills: 0 | Status: COMPLETED | OUTPATIENT
Start: 2025-06-14 | End: 2025-06-21

## 2025-06-14 RX ORDER — INSULIN LISPRO 100 U/ML
5 INJECTION, SOLUTION INTRAVENOUS; SUBCUTANEOUS
Refills: 0 | Status: DISCONTINUED | OUTPATIENT
Start: 2025-06-14 | End: 2025-06-17

## 2025-06-14 RX ORDER — MELATONIN 5 MG
3 TABLET ORAL AT BEDTIME
Refills: 0 | Status: DISCONTINUED | OUTPATIENT
Start: 2025-06-14 | End: 2025-06-21

## 2025-06-14 RX ORDER — ENOXAPARIN SODIUM 100 MG/ML
40 INJECTION SUBCUTANEOUS EVERY 24 HOURS
Refills: 0 | Status: DISCONTINUED | OUTPATIENT
Start: 2025-06-14 | End: 2025-06-14

## 2025-06-14 RX ORDER — ALPRAZOLAM 0.5 MG
0.5 TABLET, EXTENDED RELEASE 24 HR ORAL THREE TIMES A DAY
Refills: 0 | Status: DISCONTINUED | OUTPATIENT
Start: 2025-06-14 | End: 2025-06-20

## 2025-06-14 RX ORDER — CARVEDILOL 3.12 MG/1
3.12 TABLET, FILM COATED ORAL EVERY 12 HOURS
Refills: 0 | Status: DISCONTINUED | OUTPATIENT
Start: 2025-06-14 | End: 2025-06-17

## 2025-06-14 RX ORDER — HYDROMORPHONE/SOD CHLOR,ISO/PF 2 MG/10 ML
1 SYRINGE (ML) INJECTION ONCE
Refills: 0 | Status: DISCONTINUED | OUTPATIENT
Start: 2025-06-14 | End: 2025-06-14

## 2025-06-14 RX ORDER — INSULIN LISPRO 100 U/ML
INJECTION, SOLUTION INTRAVENOUS; SUBCUTANEOUS AT BEDTIME
Refills: 0 | Status: DISCONTINUED | OUTPATIENT
Start: 2025-06-14 | End: 2025-06-16

## 2025-06-14 RX ORDER — SPIRONOLACTONE 25 MG
25 TABLET ORAL DAILY
Refills: 0 | Status: DISCONTINUED | OUTPATIENT
Start: 2025-06-14 | End: 2025-06-21

## 2025-06-14 RX ORDER — ATORVASTATIN CALCIUM 80 MG/1
20 TABLET, FILM COATED ORAL AT BEDTIME
Refills: 0 | Status: DISCONTINUED | OUTPATIENT
Start: 2025-06-14 | End: 2025-06-21

## 2025-06-14 RX ADMIN — Medication 1 MILLIGRAM(S): at 13:58

## 2025-06-14 RX ADMIN — INSULIN GLARGINE-YFGN 35 UNIT(S): 100 INJECTION, SOLUTION SUBCUTANEOUS at 21:54

## 2025-06-14 RX ADMIN — CARVEDILOL 3.12 MILLIGRAM(S): 3.12 TABLET, FILM COATED ORAL at 17:51

## 2025-06-14 RX ADMIN — INSULIN LISPRO 2: 100 INJECTION, SOLUTION INTRAVENOUS; SUBCUTANEOUS at 18:06

## 2025-06-14 RX ADMIN — PREGABALIN 100 MILLIGRAM(S): 50 CAPSULE ORAL at 21:55

## 2025-06-14 RX ADMIN — Medication 25 GRAM(S): at 17:51

## 2025-06-14 RX ADMIN — Medication 2 MILLIGRAM(S): at 23:00

## 2025-06-14 RX ADMIN — HEPARIN SODIUM 5000 UNIT(S): 1000 INJECTION INTRAVENOUS; SUBCUTANEOUS at 21:55

## 2025-06-14 RX ADMIN — Medication 2 MILLIGRAM(S): at 15:30

## 2025-06-14 RX ADMIN — ATORVASTATIN CALCIUM 20 MILLIGRAM(S): 80 TABLET, FILM COATED ORAL at 21:55

## 2025-06-14 RX ADMIN — INSULIN LISPRO 5 UNIT(S): 100 INJECTION, SOLUTION INTRAVENOUS; SUBCUTANEOUS at 18:06

## 2025-06-14 RX ADMIN — BUMETANIDE 2 MILLIGRAM(S): 1 TABLET ORAL at 18:37

## 2025-06-14 RX ADMIN — Medication 1 MILLIGRAM(S): at 12:58

## 2025-06-14 RX ADMIN — Medication 40 MILLIGRAM(S): at 18:41

## 2025-06-14 RX ADMIN — Medication 2 MILLIGRAM(S): at 22:12

## 2025-06-15 DIAGNOSIS — S91.319A LACERATION WITHOUT FOREIGN BODY, UNSPECIFIED FOOT, INITIAL ENCOUNTER: ICD-10-CM

## 2025-06-15 LAB
ALBUMIN SERPL ELPH-MCNC: 3.2 G/DL — LOW (ref 3.5–5)
ALP SERPL-CCNC: 120 U/L — SIGNIFICANT CHANGE UP (ref 40–120)
ALT FLD-CCNC: 21 U/L DA — SIGNIFICANT CHANGE UP (ref 10–60)
ANION GAP SERPL CALC-SCNC: 6 MMOL/L — SIGNIFICANT CHANGE UP (ref 5–17)
ANION GAP SERPL CALC-SCNC: 7 MMOL/L — SIGNIFICANT CHANGE UP (ref 5–17)
AST SERPL-CCNC: 17 U/L — SIGNIFICANT CHANGE UP (ref 10–40)
BASOPHILS # BLD AUTO: 0.03 K/UL — SIGNIFICANT CHANGE UP (ref 0–0.2)
BASOPHILS NFR BLD AUTO: 0.3 % — SIGNIFICANT CHANGE UP (ref 0–2)
BILIRUB SERPL-MCNC: 1 MG/DL — SIGNIFICANT CHANGE UP (ref 0.2–1.2)
BUN SERPL-MCNC: 13 MG/DL — SIGNIFICANT CHANGE UP (ref 7–18)
BUN SERPL-MCNC: 14 MG/DL — SIGNIFICANT CHANGE UP (ref 7–18)
CALCIUM SERPL-MCNC: 8.6 MG/DL — SIGNIFICANT CHANGE UP (ref 8.4–10.5)
CALCIUM SERPL-MCNC: 8.8 MG/DL — SIGNIFICANT CHANGE UP (ref 8.4–10.5)
CHLORIDE SERPL-SCNC: 102 MMOL/L — SIGNIFICANT CHANGE UP (ref 96–108)
CHLORIDE SERPL-SCNC: 99 MMOL/L — SIGNIFICANT CHANGE UP (ref 96–108)
CHOLEST SERPL-MCNC: 105 MG/DL — SIGNIFICANT CHANGE UP
CO2 SERPL-SCNC: 28 MMOL/L — SIGNIFICANT CHANGE UP (ref 22–31)
CO2 SERPL-SCNC: 29 MMOL/L — SIGNIFICANT CHANGE UP (ref 22–31)
CREAT ?TM UR-MCNC: 45 MG/DL — SIGNIFICANT CHANGE UP
CREAT SERPL-MCNC: 1.13 MG/DL — SIGNIFICANT CHANGE UP (ref 0.5–1.3)
CREAT SERPL-MCNC: 1.2 MG/DL — SIGNIFICANT CHANGE UP (ref 0.5–1.3)
EGFR: 53 ML/MIN/1.73M2 — LOW
EGFR: 53 ML/MIN/1.73M2 — LOW
EGFR: 57 ML/MIN/1.73M2 — LOW
EGFR: 57 ML/MIN/1.73M2 — LOW
EOSINOPHIL # BLD AUTO: 0.05 K/UL — SIGNIFICANT CHANGE UP (ref 0–0.5)
EOSINOPHIL NFR BLD AUTO: 0.4 % — SIGNIFICANT CHANGE UP (ref 0–6)
GLUCOSE BLDC GLUCOMTR-MCNC: 149 MG/DL — HIGH (ref 70–99)
GLUCOSE BLDC GLUCOMTR-MCNC: 158 MG/DL — HIGH (ref 70–99)
GLUCOSE BLDC GLUCOMTR-MCNC: 193 MG/DL — HIGH (ref 70–99)
GLUCOSE BLDC GLUCOMTR-MCNC: 214 MG/DL — HIGH (ref 70–99)
GLUCOSE BLDC GLUCOMTR-MCNC: 229 MG/DL — HIGH (ref 70–99)
GLUCOSE SERPL-MCNC: 156 MG/DL — HIGH (ref 70–99)
GLUCOSE SERPL-MCNC: 169 MG/DL — HIGH (ref 70–99)
HCT VFR BLD CALC: 34.6 % — SIGNIFICANT CHANGE UP (ref 34.5–45)
HDLC SERPL-MCNC: 46 MG/DL — LOW
HGB BLD-MCNC: 11.4 G/DL — LOW (ref 11.5–15.5)
IMM GRANULOCYTES NFR BLD AUTO: 0.3 % — SIGNIFICANT CHANGE UP (ref 0–0.9)
LDLC SERPL-MCNC: 38 MG/DL — SIGNIFICANT CHANGE UP
LIPID PNL WITH DIRECT LDL SERPL: 38 MG/DL — SIGNIFICANT CHANGE UP
LYMPHOCYTES # BLD AUTO: 27.5 % — SIGNIFICANT CHANGE UP (ref 13–44)
LYMPHOCYTES # BLD AUTO: 3.18 K/UL — SIGNIFICANT CHANGE UP (ref 1–3.3)
MAGNESIUM SERPL-MCNC: 1.5 MG/DL — LOW (ref 1.6–2.6)
MAGNESIUM SERPL-MCNC: 1.7 MG/DL — SIGNIFICANT CHANGE UP (ref 1.6–2.6)
MCHC RBC-ENTMCNC: 28.6 PG — SIGNIFICANT CHANGE UP (ref 27–34)
MCHC RBC-ENTMCNC: 32.9 G/DL — SIGNIFICANT CHANGE UP (ref 32–36)
MCV RBC AUTO: 86.9 FL — SIGNIFICANT CHANGE UP (ref 80–100)
MONOCYTES # BLD AUTO: 0.71 K/UL — SIGNIFICANT CHANGE UP (ref 0–0.9)
MONOCYTES NFR BLD AUTO: 6.1 % — SIGNIFICANT CHANGE UP (ref 2–14)
NEUTROPHILS # BLD AUTO: 7.54 K/UL — HIGH (ref 1.8–7.4)
NEUTROPHILS NFR BLD AUTO: 65.4 % — SIGNIFICANT CHANGE UP (ref 43–77)
NONHDLC SERPL-MCNC: 59 MG/DL — SIGNIFICANT CHANGE UP
NRBC BLD AUTO-RTO: 0 /100 WBCS — SIGNIFICANT CHANGE UP (ref 0–0)
PHOSPHATE SERPL-MCNC: 3.6 MG/DL — SIGNIFICANT CHANGE UP (ref 2.5–4.5)
PHOSPHATE SERPL-MCNC: 3.7 MG/DL — SIGNIFICANT CHANGE UP (ref 2.5–4.5)
PLATELET # BLD AUTO: 373 K/UL — SIGNIFICANT CHANGE UP (ref 150–400)
POTASSIUM SERPL-MCNC: 3.1 MMOL/L — LOW (ref 3.5–5.3)
POTASSIUM SERPL-MCNC: 3.1 MMOL/L — LOW (ref 3.5–5.3)
POTASSIUM SERPL-SCNC: 3.1 MMOL/L — LOW (ref 3.5–5.3)
POTASSIUM SERPL-SCNC: 3.1 MMOL/L — LOW (ref 3.5–5.3)
PROT ?TM UR-MCNC: 7 MG/DL — SIGNIFICANT CHANGE UP (ref 0–12)
PROT SERPL-MCNC: 7.6 G/DL — SIGNIFICANT CHANGE UP (ref 6–8.3)
PROT/CREAT UR-RTO: 0.2 RATIO — SIGNIFICANT CHANGE UP (ref 0–0.2)
RBC # BLD: 3.98 M/UL — SIGNIFICANT CHANGE UP (ref 3.8–5.2)
RBC # FLD: 13.2 % — SIGNIFICANT CHANGE UP (ref 10.3–14.5)
SODIUM SERPL-SCNC: 135 MMOL/L — SIGNIFICANT CHANGE UP (ref 135–145)
SODIUM SERPL-SCNC: 136 MMOL/L — SIGNIFICANT CHANGE UP (ref 135–145)
SODIUM UR-SCNC: 103 MMOL/L — SIGNIFICANT CHANGE UP
TRIGL SERPL-MCNC: 114 MG/DL — SIGNIFICANT CHANGE UP
TROPONIN I, HIGH SENSITIVITY RESULT: 28.5 NG/L — SIGNIFICANT CHANGE UP
WBC # BLD: 11.55 K/UL — HIGH (ref 3.8–10.5)
WBC # FLD AUTO: 11.55 K/UL — HIGH (ref 3.8–10.5)

## 2025-06-15 PROCEDURE — 99233 SBSQ HOSP IP/OBS HIGH 50: CPT | Mod: GC

## 2025-06-15 RX ORDER — MAGNESIUM SULFATE 500 MG/ML
1 SYRINGE (ML) INJECTION ONCE
Refills: 0 | Status: COMPLETED | OUTPATIENT
Start: 2025-06-15 | End: 2025-06-15

## 2025-06-15 RX ORDER — HYDROMORPHONE/SOD CHLOR,ISO/PF 2 MG/10 ML
2 SYRINGE (ML) INJECTION ONCE
Refills: 0 | Status: DISCONTINUED | OUTPATIENT
Start: 2025-06-15 | End: 2025-06-15

## 2025-06-15 RX ORDER — BUMETANIDE 1 MG/1
2 TABLET ORAL DAILY
Refills: 0 | Status: DISCONTINUED | OUTPATIENT
Start: 2025-06-16 | End: 2025-06-21

## 2025-06-15 RX ADMIN — Medication 100 GRAM(S): at 13:16

## 2025-06-15 RX ADMIN — Medication 40 MILLIEQUIVALENT(S): at 11:53

## 2025-06-15 RX ADMIN — ATORVASTATIN CALCIUM 20 MILLIGRAM(S): 80 TABLET, FILM COATED ORAL at 21:13

## 2025-06-15 RX ADMIN — PREGABALIN 100 MILLIGRAM(S): 50 CAPSULE ORAL at 13:17

## 2025-06-15 RX ADMIN — BUMETANIDE 2 MILLIGRAM(S): 1 TABLET ORAL at 13:55

## 2025-06-15 RX ADMIN — Medication 40 MILLIEQUIVALENT(S): at 19:35

## 2025-06-15 RX ADMIN — CARVEDILOL 3.12 MILLIGRAM(S): 3.12 TABLET, FILM COATED ORAL at 05:05

## 2025-06-15 RX ADMIN — PREGABALIN 100 MILLIGRAM(S): 50 CAPSULE ORAL at 21:13

## 2025-06-15 RX ADMIN — BUMETANIDE 2 MILLIGRAM(S): 1 TABLET ORAL at 06:35

## 2025-06-15 RX ADMIN — Medication 40 MILLIEQUIVALENT(S): at 21:13

## 2025-06-15 RX ADMIN — HEPARIN SODIUM 5000 UNIT(S): 1000 INJECTION INTRAVENOUS; SUBCUTANEOUS at 05:05

## 2025-06-15 RX ADMIN — CARVEDILOL 3.12 MILLIGRAM(S): 3.12 TABLET, FILM COATED ORAL at 18:27

## 2025-06-15 RX ADMIN — Medication 2 MILLIGRAM(S): at 06:00

## 2025-06-15 RX ADMIN — Medication 25 MILLIGRAM(S): at 05:05

## 2025-06-15 RX ADMIN — LISINOPRIL 5 MILLIGRAM(S): 5 TABLET ORAL at 06:36

## 2025-06-15 RX ADMIN — INSULIN LISPRO 1: 100 INJECTION, SOLUTION INTRAVENOUS; SUBCUTANEOUS at 08:22

## 2025-06-15 RX ADMIN — Medication 2 MILLIGRAM(S): at 18:28

## 2025-06-15 RX ADMIN — INSULIN GLARGINE-YFGN 35 UNIT(S): 100 INJECTION, SOLUTION SUBCUTANEOUS at 21:31

## 2025-06-15 RX ADMIN — Medication 2 MILLIGRAM(S): at 18:58

## 2025-06-15 RX ADMIN — HEPARIN SODIUM 5000 UNIT(S): 1000 INJECTION INTRAVENOUS; SUBCUTANEOUS at 21:13

## 2025-06-15 RX ADMIN — INSULIN LISPRO 5 UNIT(S): 100 INJECTION, SOLUTION INTRAVENOUS; SUBCUTANEOUS at 11:53

## 2025-06-15 RX ADMIN — INSULIN LISPRO 2: 100 INJECTION, SOLUTION INTRAVENOUS; SUBCUTANEOUS at 11:53

## 2025-06-15 RX ADMIN — INSULIN LISPRO 5 UNIT(S): 100 INJECTION, SOLUTION INTRAVENOUS; SUBCUTANEOUS at 18:27

## 2025-06-15 RX ADMIN — Medication 40 MILLIEQUIVALENT(S): at 13:58

## 2025-06-15 RX ADMIN — MONTELUKAST SODIUM 10 MILLIGRAM(S): 10 TABLET ORAL at 11:55

## 2025-06-15 RX ADMIN — Medication 2 MILLIGRAM(S): at 12:00

## 2025-06-15 RX ADMIN — Medication 2 MILLIGRAM(S): at 05:04

## 2025-06-15 RX ADMIN — Medication 2 MILLIGRAM(S): at 12:30

## 2025-06-15 RX ADMIN — Medication 2 MILLIGRAM(S): at 23:44

## 2025-06-15 RX ADMIN — INSULIN LISPRO 5 UNIT(S): 100 INJECTION, SOLUTION INTRAVENOUS; SUBCUTANEOUS at 08:22

## 2025-06-15 RX ADMIN — Medication 40 MILLIGRAM(S): at 06:36

## 2025-06-15 RX ADMIN — HEPARIN SODIUM 5000 UNIT(S): 1000 INJECTION INTRAVENOUS; SUBCUTANEOUS at 13:16

## 2025-06-15 RX ADMIN — PREGABALIN 100 MILLIGRAM(S): 50 CAPSULE ORAL at 06:36

## 2025-06-16 ENCOUNTER — NON-APPOINTMENT (OUTPATIENT)
Age: 55
End: 2025-06-16

## 2025-06-16 DIAGNOSIS — G56.40 CAUSALGIA OF UNSPECIFIED UPPER LIMB: ICD-10-CM

## 2025-06-16 DIAGNOSIS — Z97.8 PRESENCE OF OTHER SPECIFIED DEVICES: ICD-10-CM

## 2025-06-16 LAB
ALBUMIN SERPL ELPH-MCNC: 3.2 G/DL — LOW (ref 3.5–5)
ALP SERPL-CCNC: 120 U/L — SIGNIFICANT CHANGE UP (ref 40–120)
ALT FLD-CCNC: 20 U/L DA — SIGNIFICANT CHANGE UP (ref 10–60)
ANION GAP SERPL CALC-SCNC: 6 MMOL/L — SIGNIFICANT CHANGE UP (ref 5–17)
AST SERPL-CCNC: 12 U/L — SIGNIFICANT CHANGE UP (ref 10–40)
BASOPHILS # BLD AUTO: 0.04 K/UL — SIGNIFICANT CHANGE UP (ref 0–0.2)
BASOPHILS NFR BLD AUTO: 0.3 % — SIGNIFICANT CHANGE UP (ref 0–2)
BILIRUB SERPL-MCNC: 0.8 MG/DL — SIGNIFICANT CHANGE UP (ref 0.2–1.2)
BUN SERPL-MCNC: 17 MG/DL — SIGNIFICANT CHANGE UP (ref 7–18)
CALCIUM SERPL-MCNC: 8.6 MG/DL — SIGNIFICANT CHANGE UP (ref 8.4–10.5)
CHLORIDE SERPL-SCNC: 102 MMOL/L — SIGNIFICANT CHANGE UP (ref 96–108)
CO2 SERPL-SCNC: 26 MMOL/L — SIGNIFICANT CHANGE UP (ref 22–31)
CREAT SERPL-MCNC: 1.16 MG/DL — SIGNIFICANT CHANGE UP (ref 0.5–1.3)
EGFR: 56 ML/MIN/1.73M2 — LOW
EGFR: 56 ML/MIN/1.73M2 — LOW
EOSINOPHIL # BLD AUTO: 0.14 K/UL — SIGNIFICANT CHANGE UP (ref 0–0.5)
EOSINOPHIL NFR BLD AUTO: 1.1 % — SIGNIFICANT CHANGE UP (ref 0–6)
GLUCOSE BLDC GLUCOMTR-MCNC: 176 MG/DL — HIGH (ref 70–99)
GLUCOSE BLDC GLUCOMTR-MCNC: 177 MG/DL — HIGH (ref 70–99)
GLUCOSE BLDC GLUCOMTR-MCNC: 209 MG/DL — HIGH (ref 70–99)
GLUCOSE BLDC GLUCOMTR-MCNC: 256 MG/DL — HIGH (ref 70–99)
GLUCOSE BLDC GLUCOMTR-MCNC: 258 MG/DL — HIGH (ref 70–99)
GLUCOSE SERPL-MCNC: 207 MG/DL — HIGH (ref 70–99)
HCT VFR BLD CALC: 35.5 % — SIGNIFICANT CHANGE UP (ref 34.5–45)
HGB BLD-MCNC: 11.5 G/DL — SIGNIFICANT CHANGE UP (ref 11.5–15.5)
IMM GRANULOCYTES NFR BLD AUTO: 0.2 % — SIGNIFICANT CHANGE UP (ref 0–0.9)
LYMPHOCYTES # BLD AUTO: 3.77 K/UL — HIGH (ref 1–3.3)
LYMPHOCYTES # BLD AUTO: 30.4 % — SIGNIFICANT CHANGE UP (ref 13–44)
MAGNESIUM SERPL-MCNC: 1.8 MG/DL — SIGNIFICANT CHANGE UP (ref 1.6–2.6)
MCHC RBC-ENTMCNC: 28.7 PG — SIGNIFICANT CHANGE UP (ref 27–34)
MCHC RBC-ENTMCNC: 32.4 G/DL — SIGNIFICANT CHANGE UP (ref 32–36)
MCV RBC AUTO: 88.5 FL — SIGNIFICANT CHANGE UP (ref 80–100)
MONOCYTES # BLD AUTO: 0.69 K/UL — SIGNIFICANT CHANGE UP (ref 0–0.9)
MONOCYTES NFR BLD AUTO: 5.6 % — SIGNIFICANT CHANGE UP (ref 2–14)
NEUTROPHILS # BLD AUTO: 7.74 K/UL — HIGH (ref 1.8–7.4)
NEUTROPHILS NFR BLD AUTO: 62.4 % — SIGNIFICANT CHANGE UP (ref 43–77)
NRBC BLD AUTO-RTO: 0 /100 WBCS — SIGNIFICANT CHANGE UP (ref 0–0)
PHOSPHATE SERPL-MCNC: 3.6 MG/DL — SIGNIFICANT CHANGE UP (ref 2.5–4.5)
PLATELET # BLD AUTO: 387 K/UL — SIGNIFICANT CHANGE UP (ref 150–400)
POTASSIUM SERPL-MCNC: 3.8 MMOL/L — SIGNIFICANT CHANGE UP (ref 3.5–5.3)
POTASSIUM SERPL-SCNC: 3.8 MMOL/L — SIGNIFICANT CHANGE UP (ref 3.5–5.3)
PROT SERPL-MCNC: 7.7 G/DL — SIGNIFICANT CHANGE UP (ref 6–8.3)
RBC # BLD: 4.01 M/UL — SIGNIFICANT CHANGE UP (ref 3.8–5.2)
RBC # FLD: 13.3 % — SIGNIFICANT CHANGE UP (ref 10.3–14.5)
SODIUM SERPL-SCNC: 134 MMOL/L — LOW (ref 135–145)
WBC # BLD: 12.41 K/UL — HIGH (ref 3.8–10.5)
WBC # FLD AUTO: 12.41 K/UL — HIGH (ref 3.8–10.5)

## 2025-06-16 PROCEDURE — 99233 SBSQ HOSP IP/OBS HIGH 50: CPT

## 2025-06-16 PROCEDURE — 99222 1ST HOSP IP/OBS MODERATE 55: CPT | Mod: FS

## 2025-06-16 RX ORDER — FENTANYL CITRATE-0.9 % NACL/PF 100MCG/2ML
1 SYRINGE (ML) INTRAVENOUS
Refills: 0 | Status: DISCONTINUED | OUTPATIENT
Start: 2025-06-16 | End: 2025-06-18

## 2025-06-16 RX ORDER — INSULIN LISPRO 100 U/ML
INJECTION, SOLUTION INTRAVENOUS; SUBCUTANEOUS AT BEDTIME
Refills: 0 | Status: DISCONTINUED | OUTPATIENT
Start: 2025-06-16 | End: 2025-06-21

## 2025-06-16 RX ORDER — ONDANSETRON HCL/PF 4 MG/2 ML
4 VIAL (ML) INJECTION EVERY 8 HOURS
Refills: 0 | Status: DISCONTINUED | OUTPATIENT
Start: 2025-06-16 | End: 2025-06-21

## 2025-06-16 RX ORDER — INSULIN GLARGINE-YFGN 100 [IU]/ML
42 INJECTION, SOLUTION SUBCUTANEOUS AT BEDTIME
Refills: 0 | Status: DISCONTINUED | OUTPATIENT
Start: 2025-06-16 | End: 2025-06-17

## 2025-06-16 RX ORDER — MAGNESIUM, ALUMINUM HYDROXIDE 200-200 MG
30 TABLET,CHEWABLE ORAL EVERY 6 HOURS
Refills: 0 | Status: DISCONTINUED | OUTPATIENT
Start: 2025-06-16 | End: 2025-06-21

## 2025-06-16 RX ORDER — BACLOFEN 10 MG/20ML
20 INJECTION INTRATHECAL EVERY 8 HOURS
Refills: 0 | Status: DISCONTINUED | OUTPATIENT
Start: 2025-06-16 | End: 2025-06-21

## 2025-06-16 RX ORDER — INSULIN LISPRO 100 U/ML
INJECTION, SOLUTION INTRAVENOUS; SUBCUTANEOUS
Refills: 0 | Status: DISCONTINUED | OUTPATIENT
Start: 2025-06-16 | End: 2025-06-21

## 2025-06-16 RX ADMIN — Medication 0.5 MILLIGRAM(S): at 08:19

## 2025-06-16 RX ADMIN — INSULIN GLARGINE-YFGN 42 UNIT(S): 100 INJECTION, SOLUTION SUBCUTANEOUS at 21:22

## 2025-06-16 RX ADMIN — HEPARIN SODIUM 5000 UNIT(S): 1000 INJECTION INTRAVENOUS; SUBCUTANEOUS at 21:21

## 2025-06-16 RX ADMIN — INSULIN LISPRO 4: 100 INJECTION, SOLUTION INTRAVENOUS; SUBCUTANEOUS at 18:07

## 2025-06-16 RX ADMIN — LISINOPRIL 5 MILLIGRAM(S): 5 TABLET ORAL at 06:00

## 2025-06-16 RX ADMIN — HEPARIN SODIUM 5000 UNIT(S): 1000 INJECTION INTRAVENOUS; SUBCUTANEOUS at 14:11

## 2025-06-16 RX ADMIN — INSULIN LISPRO 5 UNIT(S): 100 INJECTION, SOLUTION INTRAVENOUS; SUBCUTANEOUS at 18:06

## 2025-06-16 RX ADMIN — HEPARIN SODIUM 5000 UNIT(S): 1000 INJECTION INTRAVENOUS; SUBCUTANEOUS at 05:55

## 2025-06-16 RX ADMIN — Medication 0.5 MILLIGRAM(S): at 15:55

## 2025-06-16 RX ADMIN — Medication 3 MILLIGRAM(S): at 21:22

## 2025-06-16 RX ADMIN — INSULIN LISPRO 5 UNIT(S): 100 INJECTION, SOLUTION INTRAVENOUS; SUBCUTANEOUS at 08:01

## 2025-06-16 RX ADMIN — BUMETANIDE 2 MILLIGRAM(S): 1 TABLET ORAL at 05:56

## 2025-06-16 RX ADMIN — BACLOFEN 20 MILLIGRAM(S): 10 INJECTION INTRATHECAL at 14:26

## 2025-06-16 RX ADMIN — Medication 2 MILLIGRAM(S): at 12:20

## 2025-06-16 RX ADMIN — ATORVASTATIN CALCIUM 20 MILLIGRAM(S): 80 TABLET, FILM COATED ORAL at 21:22

## 2025-06-16 RX ADMIN — BACLOFEN 20 MILLIGRAM(S): 10 INJECTION INTRATHECAL at 23:23

## 2025-06-16 RX ADMIN — PREGABALIN 100 MILLIGRAM(S): 50 CAPSULE ORAL at 16:13

## 2025-06-16 RX ADMIN — Medication 0.5 MILLIGRAM(S): at 21:22

## 2025-06-16 RX ADMIN — Medication 1 PATCH: at 14:11

## 2025-06-16 RX ADMIN — Medication 2 MILLIGRAM(S): at 11:54

## 2025-06-16 RX ADMIN — Medication 40 MILLIGRAM(S): at 05:56

## 2025-06-16 RX ADMIN — Medication 2 MILLIGRAM(S): at 00:15

## 2025-06-16 RX ADMIN — INSULIN LISPRO 3: 100 INJECTION, SOLUTION INTRAVENOUS; SUBCUTANEOUS at 08:01

## 2025-06-16 RX ADMIN — Medication 4 MILLIGRAM(S): at 06:55

## 2025-06-16 RX ADMIN — CARVEDILOL 3.12 MILLIGRAM(S): 3.12 TABLET, FILM COATED ORAL at 18:06

## 2025-06-16 RX ADMIN — INSULIN LISPRO 3: 100 INJECTION, SOLUTION INTRAVENOUS; SUBCUTANEOUS at 11:55

## 2025-06-16 RX ADMIN — MONTELUKAST SODIUM 10 MILLIGRAM(S): 10 TABLET ORAL at 11:54

## 2025-06-16 RX ADMIN — Medication 2 MILLIGRAM(S): at 06:25

## 2025-06-16 RX ADMIN — PREGABALIN 100 MILLIGRAM(S): 50 CAPSULE ORAL at 21:21

## 2025-06-16 RX ADMIN — PREGABALIN 100 MILLIGRAM(S): 50 CAPSULE ORAL at 06:20

## 2025-06-16 RX ADMIN — Medication 30 MILLILITER(S): at 08:01

## 2025-06-16 RX ADMIN — INSULIN LISPRO 5 UNIT(S): 100 INJECTION, SOLUTION INTRAVENOUS; SUBCUTANEOUS at 11:54

## 2025-06-16 RX ADMIN — Medication 1 PATCH: at 19:46

## 2025-06-16 RX ADMIN — Medication 2 MILLIGRAM(S): at 05:55

## 2025-06-16 RX ADMIN — Medication 25 MILLIGRAM(S): at 05:56

## 2025-06-17 ENCOUNTER — TRANSCRIPTION ENCOUNTER (OUTPATIENT)
Age: 55
End: 2025-06-17

## 2025-06-17 DIAGNOSIS — G90.50 COMPLEX REGIONAL PAIN SYNDROME I, UNSPECIFIED: ICD-10-CM

## 2025-06-17 PROBLEM — R60.0 PERIPHERAL EDEMA: Status: ACTIVE | Noted: 2021-06-16

## 2025-06-17 PROBLEM — L97.411 SKIN ULCER OF RIGHT HEEL, LIMITED TO BREAKDOWN OF SKIN: Status: ACTIVE | Noted: 2025-06-17

## 2025-06-17 LAB
ALBUMIN SERPL ELPH-MCNC: 3.2 G/DL — LOW (ref 3.5–5)
ALP SERPL-CCNC: 201 U/L — HIGH (ref 40–120)
ALT FLD-CCNC: 48 U/L DA — SIGNIFICANT CHANGE UP (ref 10–60)
ANION GAP SERPL CALC-SCNC: 5 MMOL/L — SIGNIFICANT CHANGE UP (ref 5–17)
AST SERPL-CCNC: 21 U/L — SIGNIFICANT CHANGE UP (ref 10–40)
BILIRUB SERPL-MCNC: 0.6 MG/DL — SIGNIFICANT CHANGE UP (ref 0.2–1.2)
BUN SERPL-MCNC: 20 MG/DL — HIGH (ref 7–18)
CALCIUM SERPL-MCNC: 9 MG/DL — SIGNIFICANT CHANGE UP (ref 8.4–10.5)
CHLORIDE SERPL-SCNC: 102 MMOL/L — SIGNIFICANT CHANGE UP (ref 96–108)
CO2 SERPL-SCNC: 27 MMOL/L — SIGNIFICANT CHANGE UP (ref 22–31)
CREAT SERPL-MCNC: 1.28 MG/DL — SIGNIFICANT CHANGE UP (ref 0.5–1.3)
EGFR: 49 ML/MIN/1.73M2 — LOW
EGFR: 49 ML/MIN/1.73M2 — LOW
GLUCOSE BLDC GLUCOMTR-MCNC: 176 MG/DL — HIGH (ref 70–99)
GLUCOSE BLDC GLUCOMTR-MCNC: 248 MG/DL — HIGH (ref 70–99)
GLUCOSE BLDC GLUCOMTR-MCNC: 277 MG/DL — HIGH (ref 70–99)
GLUCOSE BLDC GLUCOMTR-MCNC: 360 MG/DL — HIGH (ref 70–99)
GLUCOSE SERPL-MCNC: 272 MG/DL — HIGH (ref 70–99)
HCT VFR BLD CALC: 36.9 % — SIGNIFICANT CHANGE UP (ref 34.5–45)
HGB BLD-MCNC: 12.3 G/DL — SIGNIFICANT CHANGE UP (ref 11.5–15.5)
MAGNESIUM SERPL-MCNC: 1.7 MG/DL — SIGNIFICANT CHANGE UP (ref 1.6–2.6)
MCHC RBC-ENTMCNC: 29.6 PG — SIGNIFICANT CHANGE UP (ref 27–34)
MCHC RBC-ENTMCNC: 33.3 G/DL — SIGNIFICANT CHANGE UP (ref 32–36)
MCV RBC AUTO: 88.7 FL — SIGNIFICANT CHANGE UP (ref 80–100)
NRBC BLD AUTO-RTO: 0 /100 WBCS — SIGNIFICANT CHANGE UP (ref 0–0)
PHOSPHATE SERPL-MCNC: 4.5 MG/DL — SIGNIFICANT CHANGE UP (ref 2.5–4.5)
PLATELET # BLD AUTO: 400 K/UL — SIGNIFICANT CHANGE UP (ref 150–400)
POTASSIUM SERPL-MCNC: 3.7 MMOL/L — SIGNIFICANT CHANGE UP (ref 3.5–5.3)
POTASSIUM SERPL-SCNC: 3.7 MMOL/L — SIGNIFICANT CHANGE UP (ref 3.5–5.3)
PROT SERPL-MCNC: 7.9 G/DL — SIGNIFICANT CHANGE UP (ref 6–8.3)
RBC # BLD: 4.16 M/UL — SIGNIFICANT CHANGE UP (ref 3.8–5.2)
RBC # FLD: 13.1 % — SIGNIFICANT CHANGE UP (ref 10.3–14.5)
SODIUM SERPL-SCNC: 134 MMOL/L — LOW (ref 135–145)
WBC # BLD: 10.36 K/UL — SIGNIFICANT CHANGE UP (ref 3.8–10.5)
WBC # FLD AUTO: 10.36 K/UL — SIGNIFICANT CHANGE UP (ref 3.8–10.5)

## 2025-06-17 PROCEDURE — 99233 SBSQ HOSP IP/OBS HIGH 50: CPT | Mod: GC

## 2025-06-17 PROCEDURE — 99232 SBSQ HOSP IP/OBS MODERATE 35: CPT

## 2025-06-17 RX ORDER — INSULIN LISPRO 100 U/ML
7 INJECTION, SOLUTION INTRAVENOUS; SUBCUTANEOUS
Refills: 0 | Status: DISCONTINUED | OUTPATIENT
Start: 2025-06-17 | End: 2025-06-18

## 2025-06-17 RX ORDER — INSULIN GLARGINE-YFGN 100 [IU]/ML
50 INJECTION, SOLUTION SUBCUTANEOUS AT BEDTIME
Refills: 0 | Status: DISCONTINUED | OUTPATIENT
Start: 2025-06-17 | End: 2025-06-19

## 2025-06-17 RX ORDER — LIDOCAINE HYDROCHLORIDE 20 MG/ML
1 JELLY TOPICAL DAILY
Refills: 0 | Status: DISCONTINUED | OUTPATIENT
Start: 2025-06-17 | End: 2025-06-21

## 2025-06-17 RX ADMIN — CARVEDILOL 3.12 MILLIGRAM(S): 3.12 TABLET, FILM COATED ORAL at 06:13

## 2025-06-17 RX ADMIN — INSULIN LISPRO 6: 100 INJECTION, SOLUTION INTRAVENOUS; SUBCUTANEOUS at 12:14

## 2025-06-17 RX ADMIN — Medication 25 MILLIGRAM(S): at 06:13

## 2025-06-17 RX ADMIN — PREGABALIN 100 MILLIGRAM(S): 50 CAPSULE ORAL at 06:12

## 2025-06-17 RX ADMIN — BACLOFEN 20 MILLIGRAM(S): 10 INJECTION INTRATHECAL at 11:18

## 2025-06-17 RX ADMIN — BUMETANIDE 2 MILLIGRAM(S): 1 TABLET ORAL at 06:12

## 2025-06-17 RX ADMIN — Medication 40 MILLIGRAM(S): at 06:13

## 2025-06-17 RX ADMIN — INSULIN LISPRO 2: 100 INJECTION, SOLUTION INTRAVENOUS; SUBCUTANEOUS at 17:08

## 2025-06-17 RX ADMIN — INSULIN LISPRO 6: 100 INJECTION, SOLUTION INTRAVENOUS; SUBCUTANEOUS at 21:15

## 2025-06-17 RX ADMIN — Medication 1 PATCH: at 06:20

## 2025-06-17 RX ADMIN — PREGABALIN 100 MILLIGRAM(S): 50 CAPSULE ORAL at 21:10

## 2025-06-17 RX ADMIN — HEPARIN SODIUM 5000 UNIT(S): 1000 INJECTION INTRAVENOUS; SUBCUTANEOUS at 21:10

## 2025-06-17 RX ADMIN — ATORVASTATIN CALCIUM 20 MILLIGRAM(S): 80 TABLET, FILM COATED ORAL at 21:10

## 2025-06-17 RX ADMIN — HEPARIN SODIUM 5000 UNIT(S): 1000 INJECTION INTRAVENOUS; SUBCUTANEOUS at 06:12

## 2025-06-17 RX ADMIN — LIDOCAINE HYDROCHLORIDE 1 PATCH: 20 JELLY TOPICAL at 21:05

## 2025-06-17 RX ADMIN — Medication 0.5 MILLIGRAM(S): at 11:18

## 2025-06-17 RX ADMIN — BACLOFEN 20 MILLIGRAM(S): 10 INJECTION INTRATHECAL at 23:06

## 2025-06-17 RX ADMIN — INSULIN LISPRO 7 UNIT(S): 100 INJECTION, SOLUTION INTRAVENOUS; SUBCUTANEOUS at 12:15

## 2025-06-17 RX ADMIN — INSULIN LISPRO 4: 100 INJECTION, SOLUTION INTRAVENOUS; SUBCUTANEOUS at 07:53

## 2025-06-17 RX ADMIN — Medication 0.5 MILLIGRAM(S): at 23:06

## 2025-06-17 RX ADMIN — PREGABALIN 100 MILLIGRAM(S): 50 CAPSULE ORAL at 14:42

## 2025-06-17 RX ADMIN — LIDOCAINE HYDROCHLORIDE 1 PATCH: 20 JELLY TOPICAL at 12:30

## 2025-06-17 RX ADMIN — INSULIN GLARGINE-YFGN 50 UNIT(S): 100 INJECTION, SOLUTION SUBCUTANEOUS at 21:16

## 2025-06-17 RX ADMIN — INSULIN LISPRO 7 UNIT(S): 100 INJECTION, SOLUTION INTRAVENOUS; SUBCUTANEOUS at 17:08

## 2025-06-17 RX ADMIN — Medication 1 PATCH: at 19:09

## 2025-06-17 RX ADMIN — HEPARIN SODIUM 5000 UNIT(S): 1000 INJECTION INTRAVENOUS; SUBCUTANEOUS at 14:42

## 2025-06-17 RX ADMIN — MONTELUKAST SODIUM 10 MILLIGRAM(S): 10 TABLET ORAL at 12:30

## 2025-06-17 RX ADMIN — LISINOPRIL 5 MILLIGRAM(S): 5 TABLET ORAL at 06:13

## 2025-06-17 RX ADMIN — INSULIN LISPRO 5 UNIT(S): 100 INJECTION, SOLUTION INTRAVENOUS; SUBCUTANEOUS at 07:53

## 2025-06-18 LAB
ALBUMIN SERPL ELPH-MCNC: 3.3 G/DL — LOW (ref 3.5–5)
ALP SERPL-CCNC: 194 U/L — HIGH (ref 40–120)
ALT FLD-CCNC: 45 U/L DA — SIGNIFICANT CHANGE UP (ref 10–60)
ANION GAP SERPL CALC-SCNC: 7 MMOL/L — SIGNIFICANT CHANGE UP (ref 5–17)
AST SERPL-CCNC: 28 U/L — SIGNIFICANT CHANGE UP (ref 10–40)
BASOPHILS # BLD AUTO: 0.03 K/UL — SIGNIFICANT CHANGE UP (ref 0–0.2)
BASOPHILS NFR BLD AUTO: 0.2 % — SIGNIFICANT CHANGE UP (ref 0–2)
BILIRUB SERPL-MCNC: 0.7 MG/DL — SIGNIFICANT CHANGE UP (ref 0.2–1.2)
BUN SERPL-MCNC: 20 MG/DL — HIGH (ref 7–18)
CALCIUM SERPL-MCNC: 9.3 MG/DL — SIGNIFICANT CHANGE UP (ref 8.4–10.5)
CHLORIDE SERPL-SCNC: 102 MMOL/L — SIGNIFICANT CHANGE UP (ref 96–108)
CO2 SERPL-SCNC: 28 MMOL/L — SIGNIFICANT CHANGE UP (ref 22–31)
CREAT SERPL-MCNC: 1.26 MG/DL — SIGNIFICANT CHANGE UP (ref 0.5–1.3)
EGFR: 50 ML/MIN/1.73M2 — LOW
EGFR: 50 ML/MIN/1.73M2 — LOW
EOSINOPHIL # BLD AUTO: 0.23 K/UL — SIGNIFICANT CHANGE UP (ref 0–0.5)
EOSINOPHIL NFR BLD AUTO: 1.8 % — SIGNIFICANT CHANGE UP (ref 0–6)
GLUCOSE BLDC GLUCOMTR-MCNC: 176 MG/DL — HIGH (ref 70–99)
GLUCOSE BLDC GLUCOMTR-MCNC: 179 MG/DL — HIGH (ref 70–99)
GLUCOSE BLDC GLUCOMTR-MCNC: 284 MG/DL — HIGH (ref 70–99)
GLUCOSE BLDC GLUCOMTR-MCNC: 323 MG/DL — HIGH (ref 70–99)
GLUCOSE SERPL-MCNC: 180 MG/DL — HIGH (ref 70–99)
HCT VFR BLD CALC: 38.5 % — SIGNIFICANT CHANGE UP (ref 34.5–45)
HGB BLD-MCNC: 12.5 G/DL — SIGNIFICANT CHANGE UP (ref 11.5–15.5)
IMM GRANULOCYTES NFR BLD AUTO: 0.4 % — SIGNIFICANT CHANGE UP (ref 0–0.9)
LYMPHOCYTES # BLD AUTO: 37.6 % — SIGNIFICANT CHANGE UP (ref 13–44)
LYMPHOCYTES # BLD AUTO: 4.69 K/UL — HIGH (ref 1–3.3)
MAGNESIUM SERPL-MCNC: 1.8 MG/DL — SIGNIFICANT CHANGE UP (ref 1.6–2.6)
MCHC RBC-ENTMCNC: 29.1 PG — SIGNIFICANT CHANGE UP (ref 27–34)
MCHC RBC-ENTMCNC: 32.5 G/DL — SIGNIFICANT CHANGE UP (ref 32–36)
MCV RBC AUTO: 89.5 FL — SIGNIFICANT CHANGE UP (ref 80–100)
MONOCYTES # BLD AUTO: 0.69 K/UL — SIGNIFICANT CHANGE UP (ref 0–0.9)
MONOCYTES NFR BLD AUTO: 5.5 % — SIGNIFICANT CHANGE UP (ref 2–14)
NEUTROPHILS # BLD AUTO: 6.8 K/UL — SIGNIFICANT CHANGE UP (ref 1.8–7.4)
NEUTROPHILS NFR BLD AUTO: 54.5 % — SIGNIFICANT CHANGE UP (ref 43–77)
NRBC BLD AUTO-RTO: 0 /100 WBCS — SIGNIFICANT CHANGE UP (ref 0–0)
PHOSPHATE SERPL-MCNC: 4.9 MG/DL — HIGH (ref 2.5–4.5)
PLATELET # BLD AUTO: 419 K/UL — HIGH (ref 150–400)
POTASSIUM SERPL-MCNC: 4.5 MMOL/L — SIGNIFICANT CHANGE UP (ref 3.5–5.3)
POTASSIUM SERPL-SCNC: 4.5 MMOL/L — SIGNIFICANT CHANGE UP (ref 3.5–5.3)
PROT SERPL-MCNC: 7.9 G/DL — SIGNIFICANT CHANGE UP (ref 6–8.3)
RBC # BLD: 4.3 M/UL — SIGNIFICANT CHANGE UP (ref 3.8–5.2)
RBC # FLD: 13.4 % — SIGNIFICANT CHANGE UP (ref 10.3–14.5)
SODIUM SERPL-SCNC: 137 MMOL/L — SIGNIFICANT CHANGE UP (ref 135–145)
WBC # BLD: 12.49 K/UL — HIGH (ref 3.8–10.5)
WBC # FLD AUTO: 12.49 K/UL — HIGH (ref 3.8–10.5)

## 2025-06-18 PROCEDURE — 99232 SBSQ HOSP IP/OBS MODERATE 35: CPT | Mod: GC

## 2025-06-18 PROCEDURE — 99232 SBSQ HOSP IP/OBS MODERATE 35: CPT | Mod: FS

## 2025-06-18 RX ORDER — INSULIN LISPRO 100 U/ML
10 INJECTION, SOLUTION INTRAVENOUS; SUBCUTANEOUS
Refills: 0 | Status: DISCONTINUED | OUTPATIENT
Start: 2025-06-18 | End: 2025-06-20

## 2025-06-18 RX ORDER — OXYCODONE HYDROCHLORIDE AND ACETAMINOPHEN 10; 325 MG/1; MG/1
2 TABLET ORAL EVERY 6 HOURS
Refills: 0 | Status: DISCONTINUED | OUTPATIENT
Start: 2025-06-18 | End: 2025-06-18

## 2025-06-18 RX ORDER — POLYETHYLENE GLYCOL 3350 17 G/17G
17 POWDER, FOR SOLUTION ORAL DAILY
Refills: 0 | Status: DISCONTINUED | OUTPATIENT
Start: 2025-06-18 | End: 2025-06-21

## 2025-06-18 RX ORDER — OXYCODONE HYDROCHLORIDE 30 MG/1
15 TABLET ORAL EVERY 6 HOURS
Refills: 0 | Status: DISCONTINUED | OUTPATIENT
Start: 2025-06-18 | End: 2025-06-18

## 2025-06-18 RX ORDER — OXYCODONE HYDROCHLORIDE AND ACETAMINOPHEN 10; 325 MG/1; MG/1
1 TABLET ORAL EVERY 6 HOURS
Refills: 0 | Status: DISCONTINUED | OUTPATIENT
Start: 2025-06-18 | End: 2025-06-18

## 2025-06-18 RX ORDER — SENNA 187 MG
2 TABLET ORAL AT BEDTIME
Refills: 0 | Status: DISCONTINUED | OUTPATIENT
Start: 2025-06-18 | End: 2025-06-21

## 2025-06-18 RX ADMIN — INSULIN LISPRO 7 UNIT(S): 100 INJECTION, SOLUTION INTRAVENOUS; SUBCUTANEOUS at 12:25

## 2025-06-18 RX ADMIN — PREGABALIN 100 MILLIGRAM(S): 50 CAPSULE ORAL at 06:04

## 2025-06-18 RX ADMIN — BACLOFEN 20 MILLIGRAM(S): 10 INJECTION INTRATHECAL at 22:59

## 2025-06-18 RX ADMIN — HEPARIN SODIUM 5000 UNIT(S): 1000 INJECTION INTRAVENOUS; SUBCUTANEOUS at 21:06

## 2025-06-18 RX ADMIN — HEPARIN SODIUM 5000 UNIT(S): 1000 INJECTION INTRAVENOUS; SUBCUTANEOUS at 06:04

## 2025-06-18 RX ADMIN — INSULIN GLARGINE-YFGN 50 UNIT(S): 100 INJECTION, SOLUTION SUBCUTANEOUS at 21:06

## 2025-06-18 RX ADMIN — LIDOCAINE HYDROCHLORIDE 1 PATCH: 20 JELLY TOPICAL at 20:31

## 2025-06-18 RX ADMIN — Medication 3 MILLIGRAM(S): at 21:05

## 2025-06-18 RX ADMIN — INSULIN LISPRO 2: 100 INJECTION, SOLUTION INTRAVENOUS; SUBCUTANEOUS at 08:35

## 2025-06-18 RX ADMIN — Medication 1 PATCH: at 07:47

## 2025-06-18 RX ADMIN — MONTELUKAST SODIUM 10 MILLIGRAM(S): 10 TABLET ORAL at 14:34

## 2025-06-18 RX ADMIN — INSULIN LISPRO 2: 100 INJECTION, SOLUTION INTRAVENOUS; SUBCUTANEOUS at 21:05

## 2025-06-18 RX ADMIN — INSULIN LISPRO 7 UNIT(S): 100 INJECTION, SOLUTION INTRAVENOUS; SUBCUTANEOUS at 08:35

## 2025-06-18 RX ADMIN — BUMETANIDE 2 MILLIGRAM(S): 1 TABLET ORAL at 06:05

## 2025-06-18 RX ADMIN — BACLOFEN 20 MILLIGRAM(S): 10 INJECTION INTRATHECAL at 14:34

## 2025-06-18 RX ADMIN — LIDOCAINE HYDROCHLORIDE 1 PATCH: 20 JELLY TOPICAL at 12:23

## 2025-06-18 RX ADMIN — Medication 25 MILLIGRAM(S): at 06:07

## 2025-06-18 RX ADMIN — INSULIN LISPRO 10 UNIT(S): 100 INJECTION, SOLUTION INTRAVENOUS; SUBCUTANEOUS at 17:23

## 2025-06-18 RX ADMIN — HEPARIN SODIUM 5000 UNIT(S): 1000 INJECTION INTRAVENOUS; SUBCUTANEOUS at 14:34

## 2025-06-18 RX ADMIN — PREGABALIN 100 MILLIGRAM(S): 50 CAPSULE ORAL at 14:34

## 2025-06-18 RX ADMIN — ATORVASTATIN CALCIUM 20 MILLIGRAM(S): 80 TABLET, FILM COATED ORAL at 21:05

## 2025-06-18 RX ADMIN — POLYETHYLENE GLYCOL 3350 17 GRAM(S): 17 POWDER, FOR SOLUTION ORAL at 12:22

## 2025-06-18 RX ADMIN — Medication 40 MILLIGRAM(S): at 06:04

## 2025-06-18 RX ADMIN — PREGABALIN 100 MILLIGRAM(S): 50 CAPSULE ORAL at 21:05

## 2025-06-18 RX ADMIN — Medication 2 TABLET(S): at 21:05

## 2025-06-18 RX ADMIN — LIDOCAINE HYDROCHLORIDE 1 PATCH: 20 JELLY TOPICAL at 00:30

## 2025-06-18 RX ADMIN — LISINOPRIL 5 MILLIGRAM(S): 5 TABLET ORAL at 06:05

## 2025-06-18 RX ADMIN — INSULIN LISPRO 8: 100 INJECTION, SOLUTION INTRAVENOUS; SUBCUTANEOUS at 12:24

## 2025-06-18 RX ADMIN — Medication 0.5 MILLIGRAM(S): at 21:05

## 2025-06-18 RX ADMIN — INSULIN LISPRO 2: 100 INJECTION, SOLUTION INTRAVENOUS; SUBCUTANEOUS at 17:22

## 2025-06-19 DIAGNOSIS — R74.8 ABNORMAL LEVELS OF OTHER SERUM ENZYMES: ICD-10-CM

## 2025-06-19 LAB
ALBUMIN SERPL ELPH-MCNC: 3 G/DL — LOW (ref 3.5–5)
ALP SERPL-CCNC: 181 U/L — HIGH (ref 40–120)
ALT FLD-CCNC: 49 U/L DA — SIGNIFICANT CHANGE UP (ref 10–60)
ANION GAP SERPL CALC-SCNC: 8 MMOL/L — SIGNIFICANT CHANGE UP (ref 5–17)
AST SERPL-CCNC: 29 U/L — SIGNIFICANT CHANGE UP (ref 10–40)
BASOPHILS # BLD AUTO: 0.04 K/UL — SIGNIFICANT CHANGE UP (ref 0–0.2)
BASOPHILS NFR BLD AUTO: 0.4 % — SIGNIFICANT CHANGE UP (ref 0–2)
BILIRUB SERPL-MCNC: 0.4 MG/DL — SIGNIFICANT CHANGE UP (ref 0.2–1.2)
BUN SERPL-MCNC: 21 MG/DL — HIGH (ref 7–18)
CALCIUM SERPL-MCNC: 8.7 MG/DL — SIGNIFICANT CHANGE UP (ref 8.4–10.5)
CHLORIDE SERPL-SCNC: 99 MMOL/L — SIGNIFICANT CHANGE UP (ref 96–108)
CO2 SERPL-SCNC: 25 MMOL/L — SIGNIFICANT CHANGE UP (ref 22–31)
CREAT SERPL-MCNC: 1.27 MG/DL — SIGNIFICANT CHANGE UP (ref 0.5–1.3)
EGFR: 50 ML/MIN/1.73M2 — LOW
EGFR: 50 ML/MIN/1.73M2 — LOW
EOSINOPHIL # BLD AUTO: 0.2 K/UL — SIGNIFICANT CHANGE UP (ref 0–0.5)
EOSINOPHIL NFR BLD AUTO: 1.8 % — SIGNIFICANT CHANGE UP (ref 0–6)
GLUCOSE BLDC GLUCOMTR-MCNC: 217 MG/DL — HIGH (ref 70–99)
GLUCOSE BLDC GLUCOMTR-MCNC: 232 MG/DL — HIGH (ref 70–99)
GLUCOSE BLDC GLUCOMTR-MCNC: 256 MG/DL — HIGH (ref 70–99)
GLUCOSE BLDC GLUCOMTR-MCNC: 357 MG/DL — HIGH (ref 70–99)
GLUCOSE SERPL-MCNC: 263 MG/DL — HIGH (ref 70–99)
HCT VFR BLD CALC: 35.5 % — SIGNIFICANT CHANGE UP (ref 34.5–45)
HGB BLD-MCNC: 11.6 G/DL — SIGNIFICANT CHANGE UP (ref 11.5–15.5)
IMM GRANULOCYTES NFR BLD AUTO: 0.4 % — SIGNIFICANT CHANGE UP (ref 0–0.9)
LYMPHOCYTES # BLD AUTO: 3.33 K/UL — HIGH (ref 1–3.3)
LYMPHOCYTES # BLD AUTO: 30.5 % — SIGNIFICANT CHANGE UP (ref 13–44)
MAGNESIUM SERPL-MCNC: 1.7 MG/DL — SIGNIFICANT CHANGE UP (ref 1.6–2.6)
MCHC RBC-ENTMCNC: 29.1 PG — SIGNIFICANT CHANGE UP (ref 27–34)
MCHC RBC-ENTMCNC: 32.7 G/DL — SIGNIFICANT CHANGE UP (ref 32–36)
MCV RBC AUTO: 89.2 FL — SIGNIFICANT CHANGE UP (ref 80–100)
MONOCYTES # BLD AUTO: 0.63 K/UL — SIGNIFICANT CHANGE UP (ref 0–0.9)
MONOCYTES NFR BLD AUTO: 5.8 % — SIGNIFICANT CHANGE UP (ref 2–14)
NEUTROPHILS # BLD AUTO: 6.69 K/UL — SIGNIFICANT CHANGE UP (ref 1.8–7.4)
NEUTROPHILS NFR BLD AUTO: 61.1 % — SIGNIFICANT CHANGE UP (ref 43–77)
NRBC BLD AUTO-RTO: 0 /100 WBCS — SIGNIFICANT CHANGE UP (ref 0–0)
PHOSPHATE SERPL-MCNC: 4.2 MG/DL — SIGNIFICANT CHANGE UP (ref 2.5–4.5)
PLATELET # BLD AUTO: 398 K/UL — SIGNIFICANT CHANGE UP (ref 150–400)
POTASSIUM SERPL-MCNC: 3.6 MMOL/L — SIGNIFICANT CHANGE UP (ref 3.5–5.3)
POTASSIUM SERPL-SCNC: 3.6 MMOL/L — SIGNIFICANT CHANGE UP (ref 3.5–5.3)
PROT SERPL-MCNC: 7.3 G/DL — SIGNIFICANT CHANGE UP (ref 6–8.3)
RBC # BLD: 3.98 M/UL — SIGNIFICANT CHANGE UP (ref 3.8–5.2)
RBC # FLD: 13.1 % — SIGNIFICANT CHANGE UP (ref 10.3–14.5)
SODIUM SERPL-SCNC: 132 MMOL/L — LOW (ref 135–145)
WBC # BLD: 10.93 K/UL — HIGH (ref 3.8–10.5)
WBC # FLD AUTO: 10.93 K/UL — HIGH (ref 3.8–10.5)

## 2025-06-19 PROCEDURE — 76705 ECHO EXAM OF ABDOMEN: CPT | Mod: 26

## 2025-06-19 PROCEDURE — 99232 SBSQ HOSP IP/OBS MODERATE 35: CPT | Mod: GC

## 2025-06-19 PROCEDURE — 99232 SBSQ HOSP IP/OBS MODERATE 35: CPT

## 2025-06-19 RX ORDER — INSULIN GLARGINE-YFGN 100 [IU]/ML
55 INJECTION, SOLUTION SUBCUTANEOUS AT BEDTIME
Refills: 0 | Status: DISCONTINUED | OUTPATIENT
Start: 2025-06-19 | End: 2025-06-20

## 2025-06-19 RX ORDER — HYDROMORPHONE/SOD CHLOR,ISO/PF 2 MG/10 ML
1 SYRINGE (ML) INJECTION ONCE
Refills: 0 | Status: DISCONTINUED | OUTPATIENT
Start: 2025-06-19 | End: 2025-06-19

## 2025-06-19 RX ORDER — OXYCODONE HYDROCHLORIDE 30 MG/1
15 TABLET ORAL EVERY 6 HOURS
Refills: 0 | Status: DISCONTINUED | OUTPATIENT
Start: 2025-06-19 | End: 2025-06-20

## 2025-06-19 RX ORDER — OXYCODONE HYDROCHLORIDE AND ACETAMINOPHEN 10; 325 MG/1; MG/1
1 TABLET ORAL EVERY 6 HOURS
Refills: 0 | Status: DISCONTINUED | OUTPATIENT
Start: 2025-06-19 | End: 2025-06-19

## 2025-06-19 RX ADMIN — INSULIN LISPRO 10 UNIT(S): 100 INJECTION, SOLUTION INTRAVENOUS; SUBCUTANEOUS at 16:40

## 2025-06-19 RX ADMIN — BACLOFEN 20 MILLIGRAM(S): 10 INJECTION INTRATHECAL at 07:55

## 2025-06-19 RX ADMIN — PREGABALIN 100 MILLIGRAM(S): 50 CAPSULE ORAL at 14:01

## 2025-06-19 RX ADMIN — INSULIN LISPRO 6: 100 INJECTION, SOLUTION INTRAVENOUS; SUBCUTANEOUS at 07:56

## 2025-06-19 RX ADMIN — POLYETHYLENE GLYCOL 3350 17 GRAM(S): 17 POWDER, FOR SOLUTION ORAL at 12:24

## 2025-06-19 RX ADMIN — OXYCODONE HYDROCHLORIDE 15 MILLIGRAM(S): 30 TABLET ORAL at 16:39

## 2025-06-19 RX ADMIN — ATORVASTATIN CALCIUM 20 MILLIGRAM(S): 80 TABLET, FILM COATED ORAL at 21:33

## 2025-06-19 RX ADMIN — PREGABALIN 100 MILLIGRAM(S): 50 CAPSULE ORAL at 06:11

## 2025-06-19 RX ADMIN — Medication 40 MILLIGRAM(S): at 06:12

## 2025-06-19 RX ADMIN — INSULIN GLARGINE-YFGN 55 UNIT(S): 100 INJECTION, SOLUTION SUBCUTANEOUS at 21:34

## 2025-06-19 RX ADMIN — INSULIN LISPRO 4: 100 INJECTION, SOLUTION INTRAVENOUS; SUBCUTANEOUS at 16:40

## 2025-06-19 RX ADMIN — INSULIN LISPRO 10 UNIT(S): 100 INJECTION, SOLUTION INTRAVENOUS; SUBCUTANEOUS at 07:56

## 2025-06-19 RX ADMIN — OXYCODONE HYDROCHLORIDE 15 MILLIGRAM(S): 30 TABLET ORAL at 10:55

## 2025-06-19 RX ADMIN — OXYCODONE HYDROCHLORIDE 15 MILLIGRAM(S): 30 TABLET ORAL at 09:55

## 2025-06-19 RX ADMIN — HEPARIN SODIUM 5000 UNIT(S): 1000 INJECTION INTRAVENOUS; SUBCUTANEOUS at 06:12

## 2025-06-19 RX ADMIN — HEPARIN SODIUM 5000 UNIT(S): 1000 INJECTION INTRAVENOUS; SUBCUTANEOUS at 21:33

## 2025-06-19 RX ADMIN — PREGABALIN 100 MILLIGRAM(S): 50 CAPSULE ORAL at 21:33

## 2025-06-19 RX ADMIN — BUMETANIDE 2 MILLIGRAM(S): 1 TABLET ORAL at 06:11

## 2025-06-19 RX ADMIN — HEPARIN SODIUM 5000 UNIT(S): 1000 INJECTION INTRAVENOUS; SUBCUTANEOUS at 14:01

## 2025-06-19 RX ADMIN — LIDOCAINE HYDROCHLORIDE 1 PATCH: 20 JELLY TOPICAL at 19:47

## 2025-06-19 RX ADMIN — Medication 1 MILLIGRAM(S): at 21:47

## 2025-06-19 RX ADMIN — Medication 0.5 MILLIGRAM(S): at 14:08

## 2025-06-19 RX ADMIN — Medication 25 MILLIGRAM(S): at 06:11

## 2025-06-19 RX ADMIN — Medication 3 MILLIGRAM(S): at 21:33

## 2025-06-19 RX ADMIN — Medication 2 TABLET(S): at 21:33

## 2025-06-19 RX ADMIN — MONTELUKAST SODIUM 10 MILLIGRAM(S): 10 TABLET ORAL at 12:24

## 2025-06-19 RX ADMIN — INSULIN LISPRO 10 UNIT(S): 100 INJECTION, SOLUTION INTRAVENOUS; SUBCUTANEOUS at 12:23

## 2025-06-19 RX ADMIN — LIDOCAINE HYDROCHLORIDE 1 PATCH: 20 JELLY TOPICAL at 00:56

## 2025-06-19 RX ADMIN — LISINOPRIL 5 MILLIGRAM(S): 5 TABLET ORAL at 06:12

## 2025-06-19 RX ADMIN — INSULIN LISPRO 10: 100 INJECTION, SOLUTION INTRAVENOUS; SUBCUTANEOUS at 12:23

## 2025-06-19 RX ADMIN — LIDOCAINE HYDROCHLORIDE 1 PATCH: 20 JELLY TOPICAL at 12:24

## 2025-06-19 RX ADMIN — Medication 1 MILLIGRAM(S): at 21:33

## 2025-06-19 RX ADMIN — OXYCODONE HYDROCHLORIDE 15 MILLIGRAM(S): 30 TABLET ORAL at 17:39

## 2025-06-20 DIAGNOSIS — R74.01 ELEVATION OF LEVELS OF LIVER TRANSAMINASE LEVELS: ICD-10-CM

## 2025-06-20 DIAGNOSIS — N17.9 ACUTE KIDNEY FAILURE, UNSPECIFIED: ICD-10-CM

## 2025-06-20 LAB
ALBUMIN SERPL ELPH-MCNC: 3.1 G/DL — LOW (ref 3.5–5)
ALP SERPL-CCNC: 219 U/L — HIGH (ref 40–120)
ALT FLD-CCNC: 72 U/L DA — HIGH (ref 10–60)
ANION GAP SERPL CALC-SCNC: 3 MMOL/L — LOW (ref 5–17)
AST SERPL-CCNC: 48 U/L — HIGH (ref 10–40)
BASOPHILS # BLD AUTO: 0.03 K/UL — SIGNIFICANT CHANGE UP (ref 0–0.2)
BASOPHILS NFR BLD AUTO: 0.3 % — SIGNIFICANT CHANGE UP (ref 0–2)
BILIRUB SERPL-MCNC: 0.4 MG/DL — SIGNIFICANT CHANGE UP (ref 0.2–1.2)
BUN SERPL-MCNC: 25 MG/DL — HIGH (ref 7–18)
CALCIUM SERPL-MCNC: 9.2 MG/DL — SIGNIFICANT CHANGE UP (ref 8.4–10.5)
CHLORIDE SERPL-SCNC: 99 MMOL/L — SIGNIFICANT CHANGE UP (ref 96–108)
CO2 SERPL-SCNC: 29 MMOL/L — SIGNIFICANT CHANGE UP (ref 22–31)
CREAT SERPL-MCNC: 1.32 MG/DL — HIGH (ref 0.5–1.3)
EGFR: 48 ML/MIN/1.73M2 — LOW
EGFR: 48 ML/MIN/1.73M2 — LOW
EOSINOPHIL # BLD AUTO: 0.21 K/UL — SIGNIFICANT CHANGE UP (ref 0–0.5)
EOSINOPHIL NFR BLD AUTO: 1.9 % — SIGNIFICANT CHANGE UP (ref 0–6)
GAMMA INTERFERON BACKGROUND BLD IA-ACNC: 0.02 IU/ML — SIGNIFICANT CHANGE UP
GLUCOSE BLDC GLUCOMTR-MCNC: 231 MG/DL — HIGH (ref 70–99)
GLUCOSE BLDC GLUCOMTR-MCNC: 243 MG/DL — HIGH (ref 70–99)
GLUCOSE BLDC GLUCOMTR-MCNC: 267 MG/DL — HIGH (ref 70–99)
GLUCOSE BLDC GLUCOMTR-MCNC: 282 MG/DL — HIGH (ref 70–99)
GLUCOSE BLDC GLUCOMTR-MCNC: 318 MG/DL — HIGH (ref 70–99)
GLUCOSE SERPL-MCNC: 337 MG/DL — HIGH (ref 70–99)
HCT VFR BLD CALC: 36.2 % — SIGNIFICANT CHANGE UP (ref 34.5–45)
HGB BLD-MCNC: 11.9 G/DL — SIGNIFICANT CHANGE UP (ref 11.5–15.5)
IMM GRANULOCYTES NFR BLD AUTO: 0.3 % — SIGNIFICANT CHANGE UP (ref 0–0.9)
LYMPHOCYTES # BLD AUTO: 3.26 K/UL — SIGNIFICANT CHANGE UP (ref 1–3.3)
LYMPHOCYTES # BLD AUTO: 30.2 % — SIGNIFICANT CHANGE UP (ref 13–44)
M TB IFN-G BLD-IMP: NEGATIVE — SIGNIFICANT CHANGE UP
M TB IFN-G CD4+ BCKGRND COR BLD-ACNC: 0 IU/ML — SIGNIFICANT CHANGE UP
M TB IFN-G CD4+CD8+ BCKGRND COR BLD-ACNC: 0 IU/ML — SIGNIFICANT CHANGE UP
MAGNESIUM SERPL-MCNC: 1.7 MG/DL — SIGNIFICANT CHANGE UP (ref 1.6–2.6)
MCHC RBC-ENTMCNC: 29.5 PG — SIGNIFICANT CHANGE UP (ref 27–34)
MCHC RBC-ENTMCNC: 32.9 G/DL — SIGNIFICANT CHANGE UP (ref 32–36)
MCV RBC AUTO: 89.8 FL — SIGNIFICANT CHANGE UP (ref 80–100)
MONOCYTES # BLD AUTO: 0.58 K/UL — SIGNIFICANT CHANGE UP (ref 0–0.9)
MONOCYTES NFR BLD AUTO: 5.4 % — SIGNIFICANT CHANGE UP (ref 2–14)
NEUTROPHILS # BLD AUTO: 6.67 K/UL — SIGNIFICANT CHANGE UP (ref 1.8–7.4)
NEUTROPHILS NFR BLD AUTO: 61.9 % — SIGNIFICANT CHANGE UP (ref 43–77)
NRBC BLD AUTO-RTO: 0 /100 WBCS — SIGNIFICANT CHANGE UP (ref 0–0)
PHOSPHATE SERPL-MCNC: 3.8 MG/DL — SIGNIFICANT CHANGE UP (ref 2.5–4.5)
PLATELET # BLD AUTO: 391 K/UL — SIGNIFICANT CHANGE UP (ref 150–400)
POTASSIUM SERPL-MCNC: 4.5 MMOL/L — SIGNIFICANT CHANGE UP (ref 3.5–5.3)
POTASSIUM SERPL-SCNC: 4.5 MMOL/L — SIGNIFICANT CHANGE UP (ref 3.5–5.3)
PROT SERPL-MCNC: 7.8 G/DL — SIGNIFICANT CHANGE UP (ref 6–8.3)
QUANT TB PLUS MITOGEN MINUS NIL: 3.97 IU/ML — SIGNIFICANT CHANGE UP
RBC # BLD: 4.03 M/UL — SIGNIFICANT CHANGE UP (ref 3.8–5.2)
RBC # FLD: 13.2 % — SIGNIFICANT CHANGE UP (ref 10.3–14.5)
SODIUM SERPL-SCNC: 131 MMOL/L — LOW (ref 135–145)
WBC # BLD: 10.78 K/UL — HIGH (ref 3.8–10.5)
WBC # FLD AUTO: 10.78 K/UL — HIGH (ref 3.8–10.5)

## 2025-06-20 PROCEDURE — 99232 SBSQ HOSP IP/OBS MODERATE 35: CPT | Mod: FS

## 2025-06-20 PROCEDURE — 99232 SBSQ HOSP IP/OBS MODERATE 35: CPT | Mod: GC

## 2025-06-20 RX ORDER — INSULIN LISPRO 100 U/ML
13 INJECTION, SOLUTION INTRAVENOUS; SUBCUTANEOUS
Refills: 0 | Status: DISCONTINUED | OUTPATIENT
Start: 2025-06-20 | End: 2025-06-21

## 2025-06-20 RX ORDER — INSULIN GLARGINE-YFGN 100 [IU]/ML
60 INJECTION, SOLUTION SUBCUTANEOUS AT BEDTIME
Refills: 0 | Status: DISCONTINUED | OUTPATIENT
Start: 2025-06-20 | End: 2025-06-21

## 2025-06-20 RX ORDER — OXYCODONE HYDROCHLORIDE 30 MG/1
20 TABLET ORAL EVERY 6 HOURS
Refills: 0 | Status: DISCONTINUED | OUTPATIENT
Start: 2025-06-20 | End: 2025-06-21

## 2025-06-20 RX ADMIN — Medication 2 TABLET(S): at 21:41

## 2025-06-20 RX ADMIN — Medication 25 MILLIGRAM(S): at 05:10

## 2025-06-20 RX ADMIN — INSULIN LISPRO 13 UNIT(S): 100 INJECTION, SOLUTION INTRAVENOUS; SUBCUTANEOUS at 17:12

## 2025-06-20 RX ADMIN — INSULIN LISPRO 0: 100 INJECTION, SOLUTION INTRAVENOUS; SUBCUTANEOUS at 21:42

## 2025-06-20 RX ADMIN — INSULIN LISPRO 8: 100 INJECTION, SOLUTION INTRAVENOUS; SUBCUTANEOUS at 08:16

## 2025-06-20 RX ADMIN — OXYCODONE HYDROCHLORIDE 15 MILLIGRAM(S): 30 TABLET ORAL at 03:35

## 2025-06-20 RX ADMIN — INSULIN LISPRO 10 UNIT(S): 100 INJECTION, SOLUTION INTRAVENOUS; SUBCUTANEOUS at 09:13

## 2025-06-20 RX ADMIN — OXYCODONE HYDROCHLORIDE 15 MILLIGRAM(S): 30 TABLET ORAL at 10:00

## 2025-06-20 RX ADMIN — OXYCODONE HYDROCHLORIDE 20 MILLIGRAM(S): 30 TABLET ORAL at 20:28

## 2025-06-20 RX ADMIN — OXYCODONE HYDROCHLORIDE 20 MILLIGRAM(S): 30 TABLET ORAL at 19:28

## 2025-06-20 RX ADMIN — PREGABALIN 100 MILLIGRAM(S): 50 CAPSULE ORAL at 21:40

## 2025-06-20 RX ADMIN — INSULIN LISPRO 13 UNIT(S): 100 INJECTION, SOLUTION INTRAVENOUS; SUBCUTANEOUS at 12:57

## 2025-06-20 RX ADMIN — POLYETHYLENE GLYCOL 3350 17 GRAM(S): 17 POWDER, FOR SOLUTION ORAL at 12:40

## 2025-06-20 RX ADMIN — BUMETANIDE 2 MILLIGRAM(S): 1 TABLET ORAL at 05:10

## 2025-06-20 RX ADMIN — LIDOCAINE HYDROCHLORIDE 1 PATCH: 20 JELLY TOPICAL at 00:24

## 2025-06-20 RX ADMIN — PREGABALIN 100 MILLIGRAM(S): 50 CAPSULE ORAL at 05:09

## 2025-06-20 RX ADMIN — LIDOCAINE HYDROCHLORIDE 1 PATCH: 20 JELLY TOPICAL at 17:46

## 2025-06-20 RX ADMIN — HEPARIN SODIUM 5000 UNIT(S): 1000 INJECTION INTRAVENOUS; SUBCUTANEOUS at 21:42

## 2025-06-20 RX ADMIN — HEPARIN SODIUM 5000 UNIT(S): 1000 INJECTION INTRAVENOUS; SUBCUTANEOUS at 14:49

## 2025-06-20 RX ADMIN — LIDOCAINE HYDROCHLORIDE 1 PATCH: 20 JELLY TOPICAL at 12:40

## 2025-06-20 RX ADMIN — INSULIN LISPRO 6: 100 INJECTION, SOLUTION INTRAVENOUS; SUBCUTANEOUS at 12:56

## 2025-06-20 RX ADMIN — PREGABALIN 100 MILLIGRAM(S): 50 CAPSULE ORAL at 14:49

## 2025-06-20 RX ADMIN — ATORVASTATIN CALCIUM 20 MILLIGRAM(S): 80 TABLET, FILM COATED ORAL at 21:41

## 2025-06-20 RX ADMIN — HEPARIN SODIUM 5000 UNIT(S): 1000 INJECTION INTRAVENOUS; SUBCUTANEOUS at 05:09

## 2025-06-20 RX ADMIN — Medication 40 MILLIGRAM(S): at 06:24

## 2025-06-20 RX ADMIN — BACLOFEN 20 MILLIGRAM(S): 10 INJECTION INTRATHECAL at 11:21

## 2025-06-20 RX ADMIN — Medication 0.5 MILLIGRAM(S): at 21:56

## 2025-06-20 RX ADMIN — INSULIN GLARGINE-YFGN 60 UNIT(S): 100 INJECTION, SOLUTION SUBCUTANEOUS at 21:41

## 2025-06-20 RX ADMIN — INSULIN LISPRO 4: 100 INJECTION, SOLUTION INTRAVENOUS; SUBCUTANEOUS at 17:11

## 2025-06-20 RX ADMIN — OXYCODONE HYDROCHLORIDE 20 MILLIGRAM(S): 30 TABLET ORAL at 12:40

## 2025-06-20 RX ADMIN — MONTELUKAST SODIUM 10 MILLIGRAM(S): 10 TABLET ORAL at 12:39

## 2025-06-20 RX ADMIN — LISINOPRIL 5 MILLIGRAM(S): 5 TABLET ORAL at 05:10

## 2025-06-20 RX ADMIN — OXYCODONE HYDROCHLORIDE 20 MILLIGRAM(S): 30 TABLET ORAL at 01:00

## 2025-06-20 RX ADMIN — OXYCODONE HYDROCHLORIDE 15 MILLIGRAM(S): 30 TABLET ORAL at 09:34

## 2025-06-21 ENCOUNTER — TRANSCRIPTION ENCOUNTER (OUTPATIENT)
Age: 55
End: 2025-06-21

## 2025-06-21 VITALS
OXYGEN SATURATION: 96 % | DIASTOLIC BLOOD PRESSURE: 71 MMHG | SYSTOLIC BLOOD PRESSURE: 134 MMHG | RESPIRATION RATE: 18 BRPM | TEMPERATURE: 98 F | HEART RATE: 61 BPM

## 2025-06-21 LAB
ALBUMIN SERPL ELPH-MCNC: 3 G/DL — LOW (ref 3.5–5)
ALP SERPL-CCNC: 197 U/L — HIGH (ref 40–120)
ALT FLD-CCNC: 75 U/L DA — HIGH (ref 10–60)
ANION GAP SERPL CALC-SCNC: 4 MMOL/L — LOW (ref 5–17)
AST SERPL-CCNC: 40 U/L — SIGNIFICANT CHANGE UP (ref 10–40)
BASOPHILS # BLD AUTO: 0.04 K/UL — SIGNIFICANT CHANGE UP (ref 0–0.2)
BASOPHILS NFR BLD AUTO: 0.4 % — SIGNIFICANT CHANGE UP (ref 0–2)
BILIRUB SERPL-MCNC: 0.5 MG/DL — SIGNIFICANT CHANGE UP (ref 0.2–1.2)
BUN SERPL-MCNC: 28 MG/DL — HIGH (ref 7–18)
CALCIUM SERPL-MCNC: 8.9 MG/DL — SIGNIFICANT CHANGE UP (ref 8.4–10.5)
CHLORIDE SERPL-SCNC: 98 MMOL/L — SIGNIFICANT CHANGE UP (ref 96–108)
CO2 SERPL-SCNC: 26 MMOL/L — SIGNIFICANT CHANGE UP (ref 22–31)
CREAT SERPL-MCNC: 1.3 MG/DL — SIGNIFICANT CHANGE UP (ref 0.5–1.3)
EGFR: 49 ML/MIN/1.73M2 — LOW
EGFR: 49 ML/MIN/1.73M2 — LOW
EOSINOPHIL # BLD AUTO: 0.18 K/UL — SIGNIFICANT CHANGE UP (ref 0–0.5)
EOSINOPHIL NFR BLD AUTO: 1.9 % — SIGNIFICANT CHANGE UP (ref 0–6)
GLUCOSE BLDC GLUCOMTR-MCNC: 311 MG/DL — HIGH (ref 70–99)
GLUCOSE BLDC GLUCOMTR-MCNC: 351 MG/DL — HIGH (ref 70–99)
GLUCOSE SERPL-MCNC: 268 MG/DL — HIGH (ref 70–99)
HCT VFR BLD CALC: 35.5 % — SIGNIFICANT CHANGE UP (ref 34.5–45)
HGB BLD-MCNC: 11.6 G/DL — SIGNIFICANT CHANGE UP (ref 11.5–15.5)
IMM GRANULOCYTES NFR BLD AUTO: 0.4 % — SIGNIFICANT CHANGE UP (ref 0–0.9)
LYMPHOCYTES # BLD AUTO: 3.85 K/UL — HIGH (ref 1–3.3)
LYMPHOCYTES # BLD AUTO: 41 % — SIGNIFICANT CHANGE UP (ref 13–44)
MAGNESIUM SERPL-MCNC: 1.7 MG/DL — SIGNIFICANT CHANGE UP (ref 1.6–2.6)
MCHC RBC-ENTMCNC: 29 PG — SIGNIFICANT CHANGE UP (ref 27–34)
MCHC RBC-ENTMCNC: 32.7 G/DL — SIGNIFICANT CHANGE UP (ref 32–36)
MCV RBC AUTO: 88.8 FL — SIGNIFICANT CHANGE UP (ref 80–100)
MONOCYTES # BLD AUTO: 0.53 K/UL — SIGNIFICANT CHANGE UP (ref 0–0.9)
MONOCYTES NFR BLD AUTO: 5.6 % — SIGNIFICANT CHANGE UP (ref 2–14)
NEUTROPHILS # BLD AUTO: 4.75 K/UL — SIGNIFICANT CHANGE UP (ref 1.8–7.4)
NEUTROPHILS NFR BLD AUTO: 50.7 % — SIGNIFICANT CHANGE UP (ref 43–77)
NRBC BLD AUTO-RTO: 0 /100 WBCS — SIGNIFICANT CHANGE UP (ref 0–0)
PHOSPHATE SERPL-MCNC: 3.9 MG/DL — SIGNIFICANT CHANGE UP (ref 2.5–4.5)
PLATELET # BLD AUTO: 382 K/UL — SIGNIFICANT CHANGE UP (ref 150–400)
POTASSIUM SERPL-MCNC: 3.7 MMOL/L — SIGNIFICANT CHANGE UP (ref 3.5–5.3)
POTASSIUM SERPL-SCNC: 3.7 MMOL/L — SIGNIFICANT CHANGE UP (ref 3.5–5.3)
PROT SERPL-MCNC: 7.4 G/DL — SIGNIFICANT CHANGE UP (ref 6–8.3)
RBC # BLD: 4 M/UL — SIGNIFICANT CHANGE UP (ref 3.8–5.2)
RBC # FLD: 13.3 % — SIGNIFICANT CHANGE UP (ref 10.3–14.5)
SODIUM SERPL-SCNC: 128 MMOL/L — LOW (ref 135–145)
WBC # BLD: 9.39 K/UL — SIGNIFICANT CHANGE UP (ref 3.8–10.5)
WBC # FLD AUTO: 9.39 K/UL — SIGNIFICANT CHANGE UP (ref 3.8–10.5)

## 2025-06-21 PROCEDURE — 97116 GAIT TRAINING THERAPY: CPT

## 2025-06-21 PROCEDURE — 93005 ELECTROCARDIOGRAM TRACING: CPT

## 2025-06-21 PROCEDURE — 80053 COMPREHEN METABOLIC PANEL: CPT

## 2025-06-21 PROCEDURE — 83735 ASSAY OF MAGNESIUM: CPT

## 2025-06-21 PROCEDURE — 80061 LIPID PANEL: CPT

## 2025-06-21 PROCEDURE — 80048 BASIC METABOLIC PNL TOTAL CA: CPT

## 2025-06-21 PROCEDURE — 84484 ASSAY OF TROPONIN QUANT: CPT

## 2025-06-21 PROCEDURE — 97530 THERAPEUTIC ACTIVITIES: CPT

## 2025-06-21 PROCEDURE — 84156 ASSAY OF PROTEIN URINE: CPT

## 2025-06-21 PROCEDURE — 82962 GLUCOSE BLOOD TEST: CPT

## 2025-06-21 PROCEDURE — 84100 ASSAY OF PHOSPHORUS: CPT

## 2025-06-21 PROCEDURE — 82570 ASSAY OF URINE CREATININE: CPT

## 2025-06-21 PROCEDURE — 84300 ASSAY OF URINE SODIUM: CPT

## 2025-06-21 PROCEDURE — 97162 PT EVAL MOD COMPLEX 30 MIN: CPT

## 2025-06-21 PROCEDURE — 36415 COLL VENOUS BLD VENIPUNCTURE: CPT

## 2025-06-21 PROCEDURE — 99285 EMERGENCY DEPT VISIT HI MDM: CPT

## 2025-06-21 PROCEDURE — 93306 TTE W/DOPPLER COMPLETE: CPT

## 2025-06-21 PROCEDURE — 76705 ECHO EXAM OF ABDOMEN: CPT

## 2025-06-21 PROCEDURE — 86480 TB TEST CELL IMMUN MEASURE: CPT

## 2025-06-21 PROCEDURE — 96374 THER/PROPH/DIAG INJ IV PUSH: CPT

## 2025-06-21 PROCEDURE — 84702 CHORIONIC GONADOTROPIN TEST: CPT

## 2025-06-21 PROCEDURE — 71045 X-RAY EXAM CHEST 1 VIEW: CPT

## 2025-06-21 PROCEDURE — 83880 ASSAY OF NATRIURETIC PEPTIDE: CPT

## 2025-06-21 PROCEDURE — 85025 COMPLETE CBC W/AUTO DIFF WBC: CPT

## 2025-06-21 PROCEDURE — 99233 SBSQ HOSP IP/OBS HIGH 50: CPT | Mod: GC

## 2025-06-21 PROCEDURE — 85027 COMPLETE CBC AUTOMATED: CPT

## 2025-06-21 RX ORDER — INSULIN DEGLUDEC 100 U/ML
60 INJECTION, SOLUTION SUBCUTANEOUS
Refills: 0 | DISCHARGE

## 2025-06-21 RX ORDER — CARVEDILOL 3.12 MG/1
1 TABLET, FILM COATED ORAL
Refills: 0 | DISCHARGE

## 2025-06-21 RX ORDER — INSULIN ASPART 100 [IU]/ML
14 INJECTION, SOLUTION INTRAVENOUS; SUBCUTANEOUS
Qty: 1 | Refills: 0
Start: 2025-06-21 | End: 2025-07-20

## 2025-06-21 RX ORDER — OXYCODONE HYDROCHLORIDE 30 MG/1
1 TABLET ORAL
Qty: 20 | Refills: 0
Start: 2025-06-21 | End: 2025-06-25

## 2025-06-21 RX ORDER — INSULIN ASPART 100 [IU]/ML
10 INJECTION, SOLUTION INTRAVENOUS; SUBCUTANEOUS
Refills: 0 | DISCHARGE

## 2025-06-21 RX ORDER — ISOPROPYL ALCOHOL, BENZOCAINE .7; .06 ML/ML; ML/ML
0 SWAB TOPICAL
Qty: 100 | Refills: 1
Start: 2025-06-21

## 2025-06-21 RX ORDER — BUMETANIDE 1 MG/1
1 TABLET ORAL
Qty: 30 | Refills: 0
Start: 2025-06-21 | End: 2025-07-20

## 2025-06-21 RX ORDER — INSULIN DEGLUDEC 100 U/ML
60 INJECTION, SOLUTION SUBCUTANEOUS
Qty: 1 | Refills: 0
Start: 2025-06-21 | End: 2025-07-20

## 2025-06-21 RX ORDER — BUMETANIDE 1 MG/1
1 TABLET ORAL
Refills: 0 | DISCHARGE

## 2025-06-21 RX ORDER — SENNA 187 MG
2 TABLET ORAL
Qty: 0 | Refills: 0 | DISCHARGE
Start: 2025-06-21

## 2025-06-21 RX ORDER — POLYETHYLENE GLYCOL 3350 17 G/17G
17 POWDER, FOR SOLUTION ORAL
Qty: 0 | Refills: 0 | DISCHARGE
Start: 2025-06-21

## 2025-06-21 RX ADMIN — OXYCODONE HYDROCHLORIDE 20 MILLIGRAM(S): 30 TABLET ORAL at 14:14

## 2025-06-21 RX ADMIN — INSULIN LISPRO 10: 100 INJECTION, SOLUTION INTRAVENOUS; SUBCUTANEOUS at 11:53

## 2025-06-21 RX ADMIN — INSULIN LISPRO 13 UNIT(S): 100 INJECTION, SOLUTION INTRAVENOUS; SUBCUTANEOUS at 07:41

## 2025-06-21 RX ADMIN — LIDOCAINE HYDROCHLORIDE 1 PATCH: 20 JELLY TOPICAL at 00:30

## 2025-06-21 RX ADMIN — INSULIN LISPRO 13 UNIT(S): 100 INJECTION, SOLUTION INTRAVENOUS; SUBCUTANEOUS at 11:53

## 2025-06-21 RX ADMIN — MONTELUKAST SODIUM 10 MILLIGRAM(S): 10 TABLET ORAL at 11:29

## 2025-06-21 RX ADMIN — PREGABALIN 100 MILLIGRAM(S): 50 CAPSULE ORAL at 13:45

## 2025-06-21 RX ADMIN — Medication 25 MILLIGRAM(S): at 05:30

## 2025-06-21 RX ADMIN — HEPARIN SODIUM 5000 UNIT(S): 1000 INJECTION INTRAVENOUS; SUBCUTANEOUS at 05:31

## 2025-06-21 RX ADMIN — HEPARIN SODIUM 5000 UNIT(S): 1000 INJECTION INTRAVENOUS; SUBCUTANEOUS at 13:45

## 2025-06-21 RX ADMIN — Medication 40 MILLIGRAM(S): at 05:29

## 2025-06-21 RX ADMIN — LISINOPRIL 5 MILLIGRAM(S): 5 TABLET ORAL at 05:30

## 2025-06-21 RX ADMIN — PREGABALIN 100 MILLIGRAM(S): 50 CAPSULE ORAL at 05:29

## 2025-06-21 RX ADMIN — BUMETANIDE 2 MILLIGRAM(S): 1 TABLET ORAL at 05:29

## 2025-06-21 RX ADMIN — INSULIN LISPRO 8: 100 INJECTION, SOLUTION INTRAVENOUS; SUBCUTANEOUS at 07:42

## 2025-06-21 RX ADMIN — OXYCODONE HYDROCHLORIDE 20 MILLIGRAM(S): 30 TABLET ORAL at 05:30

## 2025-06-21 RX ADMIN — LIDOCAINE HYDROCHLORIDE 1 PATCH: 20 JELLY TOPICAL at 11:27

## 2025-06-23 ENCOUNTER — NON-APPOINTMENT (OUTPATIENT)
Age: 55
End: 2025-06-23

## 2025-06-23 RX ORDER — FENTANYL 25 UG/H
25 PATCH, EXTENDED RELEASE TRANSDERMAL
Refills: 0 | Status: ACTIVE | COMMUNITY
Start: 2025-06-23

## 2025-06-23 RX ORDER — OXYCODONE 20 MG/1
20 TABLET ORAL EVERY 6 HOURS
Refills: 0 | Status: ACTIVE | COMMUNITY
Start: 2025-06-23

## 2025-06-23 RX ORDER — ALPRAZOLAM 0.5 MG/1
0.5 TABLET ORAL DAILY
Refills: 0 | Status: ACTIVE | COMMUNITY
Start: 2025-06-23

## 2025-06-23 RX ORDER — BUMETANIDE 2 MG/1
2 TABLET ORAL DAILY
Refills: 0 | Status: ACTIVE | COMMUNITY
Start: 2025-06-23

## 2025-06-24 RX ORDER — FENTANYL CITRATE-0.9 % NACL/PF 100MCG/2ML
1 SYRINGE (ML) INTRAVENOUS
Qty: 2 | Refills: 0
Start: 2025-06-24 | End: 2025-06-28

## 2025-06-24 RX ORDER — FENTANYL CITRATE-0.9 % NACL/PF 100MCG/2ML
1 SYRINGE (ML) INTRAVENOUS
Qty: 1 | Refills: 0
Start: 2025-06-24 | End: 2025-07-08

## 2025-06-24 RX ORDER — PREGABALIN 50 MG/1
1 CAPSULE ORAL
Qty: 15 | Refills: 0
Start: 2025-06-24 | End: 2025-06-28

## 2025-06-25 ENCOUNTER — APPOINTMENT (OUTPATIENT)
Dept: HOME HEALTH SERVICES | Facility: HOME HEALTH | Age: 55
End: 2025-06-25

## 2025-06-25 ENCOUNTER — APPOINTMENT (OUTPATIENT)
Age: 55
End: 2025-06-25

## 2025-06-25 PROCEDURE — 99349 HOME/RES VST EST MOD MDM 40: CPT

## 2025-07-16 ENCOUNTER — APPOINTMENT (OUTPATIENT)
Dept: FAMILY MEDICINE | Facility: CLINIC | Age: 55
End: 2025-07-16

## 2025-07-18 ENCOUNTER — APPOINTMENT (OUTPATIENT)
Dept: ENDOCRINOLOGY | Facility: CLINIC | Age: 55
End: 2025-07-18

## 2025-07-18 ENCOUNTER — INPATIENT (INPATIENT)
Facility: HOSPITAL | Age: 55
LOS: 4 days | Discharge: HOME | DRG: 204 | End: 2025-07-23
Attending: INTERNAL MEDICINE | Admitting: INTERNAL MEDICINE
Payer: MEDICARE

## 2025-07-18 ENCOUNTER — NON-APPOINTMENT (OUTPATIENT)
Age: 55
End: 2025-07-18

## 2025-07-18 VITALS
SYSTOLIC BLOOD PRESSURE: 136 MMHG | OXYGEN SATURATION: 96 % | DIASTOLIC BLOOD PRESSURE: 76 MMHG | HEIGHT: 68 IN | HEART RATE: 58 BPM | TEMPERATURE: 98 F | WEIGHT: 293 LBS | RESPIRATION RATE: 16 BRPM

## 2025-07-18 DIAGNOSIS — Z98.890 OTHER SPECIFIED POSTPROCEDURAL STATES: Chronic | ICD-10-CM

## 2025-07-18 DIAGNOSIS — Z29.9 ENCOUNTER FOR PROPHYLACTIC MEASURES, UNSPECIFIED: ICD-10-CM

## 2025-07-18 DIAGNOSIS — R07.81 PLEURODYNIA: ICD-10-CM

## 2025-07-18 DIAGNOSIS — E11.9 TYPE 2 DIABETES MELLITUS WITHOUT COMPLICATIONS: ICD-10-CM

## 2025-07-18 DIAGNOSIS — N17.9 ACUTE KIDNEY FAILURE, UNSPECIFIED: ICD-10-CM

## 2025-07-18 DIAGNOSIS — N18.9 CHRONIC KIDNEY DISEASE, UNSPECIFIED: ICD-10-CM

## 2025-07-18 DIAGNOSIS — G58.8 OTHER SPECIFIED MONONEUROPATHIES: ICD-10-CM

## 2025-07-18 DIAGNOSIS — E78.5 HYPERLIPIDEMIA, UNSPECIFIED: ICD-10-CM

## 2025-07-18 DIAGNOSIS — I10 ESSENTIAL (PRIMARY) HYPERTENSION: ICD-10-CM

## 2025-07-18 DIAGNOSIS — K58.9 IRRITABLE BOWEL SYNDROME, UNSPECIFIED: ICD-10-CM

## 2025-07-18 DIAGNOSIS — Z96.82 PRESENCE OF NEUROSTIMULATOR: Chronic | ICD-10-CM

## 2025-07-18 DIAGNOSIS — Z90.49 ACQUIRED ABSENCE OF OTHER SPECIFIED PARTS OF DIGESTIVE TRACT: Chronic | ICD-10-CM

## 2025-07-18 DIAGNOSIS — G56.40 CAUSALGIA OF UNSPECIFIED UPPER LIMB: ICD-10-CM

## 2025-07-18 LAB
ALBUMIN SERPL ELPH-MCNC: 3.4 G/DL — LOW (ref 3.5–5)
ALP SERPL-CCNC: 131 U/L — HIGH (ref 40–120)
ALT FLD-CCNC: 23 U/L DA — SIGNIFICANT CHANGE UP (ref 10–60)
ANION GAP SERPL CALC-SCNC: 4 MMOL/L — LOW (ref 5–17)
APTT BLD: 22.7 SEC — LOW (ref 26.1–36.8)
AST SERPL-CCNC: 21 U/L — SIGNIFICANT CHANGE UP (ref 10–40)
BASOPHILS # BLD AUTO: 0.02 K/UL — SIGNIFICANT CHANGE UP (ref 0–0.2)
BASOPHILS NFR BLD AUTO: 0.2 % — SIGNIFICANT CHANGE UP (ref 0–2)
BILIRUB SERPL-MCNC: 0.7 MG/DL — SIGNIFICANT CHANGE UP (ref 0.2–1.2)
BUN SERPL-MCNC: 21 MG/DL — HIGH (ref 7–18)
CALCIUM SERPL-MCNC: 9.1 MG/DL — SIGNIFICANT CHANGE UP (ref 8.4–10.5)
CHLORIDE SERPL-SCNC: 98 MMOL/L — SIGNIFICANT CHANGE UP (ref 96–108)
CO2 SERPL-SCNC: 28 MMOL/L — SIGNIFICANT CHANGE UP (ref 22–31)
CREAT SERPL-MCNC: 1.4 MG/DL — HIGH (ref 0.5–1.3)
EGFR: 44 ML/MIN/1.73M2 — LOW
EGFR: 44 ML/MIN/1.73M2 — LOW
EOSINOPHIL # BLD AUTO: 0.08 K/UL — SIGNIFICANT CHANGE UP (ref 0–0.5)
EOSINOPHIL NFR BLD AUTO: 0.6 % — SIGNIFICANT CHANGE UP (ref 0–6)
GLUCOSE BLDC GLUCOMTR-MCNC: 121 MG/DL — HIGH (ref 70–99)
GLUCOSE SERPL-MCNC: 99 MG/DL — SIGNIFICANT CHANGE UP (ref 70–99)
HCT VFR BLD CALC: 36.2 % — SIGNIFICANT CHANGE UP (ref 34.5–45)
HGB BLD-MCNC: 12 G/DL — SIGNIFICANT CHANGE UP (ref 11.5–15.5)
IMM GRANULOCYTES NFR BLD AUTO: 0.2 % — SIGNIFICANT CHANGE UP (ref 0–0.9)
INR BLD: 1.05 RATIO — SIGNIFICANT CHANGE UP (ref 0.85–1.16)
LYMPHOCYTES # BLD AUTO: 29.2 % — SIGNIFICANT CHANGE UP (ref 13–44)
LYMPHOCYTES # BLD AUTO: 3.67 K/UL — HIGH (ref 1–3.3)
MCHC RBC-ENTMCNC: 29.9 PG — SIGNIFICANT CHANGE UP (ref 27–34)
MCHC RBC-ENTMCNC: 33.1 G/DL — SIGNIFICANT CHANGE UP (ref 32–36)
MCV RBC AUTO: 90.3 FL — SIGNIFICANT CHANGE UP (ref 80–100)
MONOCYTES # BLD AUTO: 0.63 K/UL — SIGNIFICANT CHANGE UP (ref 0–0.9)
MONOCYTES NFR BLD AUTO: 5 % — SIGNIFICANT CHANGE UP (ref 2–14)
NEUTROPHILS # BLD AUTO: 8.14 K/UL — HIGH (ref 1.8–7.4)
NEUTROPHILS NFR BLD AUTO: 64.8 % — SIGNIFICANT CHANGE UP (ref 43–77)
NRBC BLD AUTO-RTO: 0 /100 WBCS — SIGNIFICANT CHANGE UP (ref 0–0)
PLATELET # BLD AUTO: 411 K/UL — HIGH (ref 150–400)
POTASSIUM SERPL-MCNC: 5.3 MMOL/L — SIGNIFICANT CHANGE UP (ref 3.5–5.3)
POTASSIUM SERPL-SCNC: 5.3 MMOL/L — SIGNIFICANT CHANGE UP (ref 3.5–5.3)
PROT SERPL-MCNC: 7.7 G/DL — SIGNIFICANT CHANGE UP (ref 6–8.3)
PROTHROM AB SERPL-ACNC: 12.3 SEC — SIGNIFICANT CHANGE UP (ref 9.9–13.4)
RBC # BLD: 4.01 M/UL — SIGNIFICANT CHANGE UP (ref 3.8–5.2)
RBC # FLD: 13.2 % — SIGNIFICANT CHANGE UP (ref 10.3–14.5)
SODIUM SERPL-SCNC: 130 MMOL/L — LOW (ref 135–145)
TROPONIN I, HIGH SENSITIVITY RESULT: 11.3 NG/L — SIGNIFICANT CHANGE UP
WBC # BLD: 12.56 K/UL — HIGH (ref 3.8–10.5)
WBC # FLD AUTO: 12.56 K/UL — HIGH (ref 3.8–10.5)

## 2025-07-18 PROCEDURE — 36415 COLL VENOUS BLD VENIPUNCTURE: CPT

## 2025-07-18 PROCEDURE — 80053 COMPREHEN METABOLIC PANEL: CPT

## 2025-07-18 PROCEDURE — 99223 1ST HOSP IP/OBS HIGH 75: CPT | Mod: GC

## 2025-07-18 PROCEDURE — 71045 X-RAY EXAM CHEST 1 VIEW: CPT | Mod: 26

## 2025-07-18 PROCEDURE — 99285 EMERGENCY DEPT VISIT HI MDM: CPT

## 2025-07-18 PROCEDURE — 85730 THROMBOPLASTIN TIME PARTIAL: CPT

## 2025-07-18 PROCEDURE — 85025 COMPLETE CBC W/AUTO DIFF WBC: CPT

## 2025-07-18 PROCEDURE — 85610 PROTHROMBIN TIME: CPT

## 2025-07-18 PROCEDURE — 82962 GLUCOSE BLOOD TEST: CPT

## 2025-07-18 PROCEDURE — 84484 ASSAY OF TROPONIN QUANT: CPT

## 2025-07-18 PROCEDURE — 71045 X-RAY EXAM CHEST 1 VIEW: CPT

## 2025-07-18 RX ORDER — ATORVASTATIN CALCIUM 80 MG/1
20 TABLET, FILM COATED ORAL AT BEDTIME
Refills: 0 | Status: DISCONTINUED | OUTPATIENT
Start: 2025-07-18 | End: 2025-07-23

## 2025-07-18 RX ORDER — INSULIN LISPRO 100 U/ML
INJECTION, SOLUTION INTRAVENOUS; SUBCUTANEOUS AT BEDTIME
Refills: 0 | Status: DISCONTINUED | OUTPATIENT
Start: 2025-07-18 | End: 2025-07-22

## 2025-07-18 RX ORDER — ONDANSETRON HCL/PF 4 MG/2 ML
4 VIAL (ML) INJECTION ONCE
Refills: 0 | Status: COMPLETED | OUTPATIENT
Start: 2025-07-18 | End: 2025-07-18

## 2025-07-18 RX ORDER — PREGABALIN 50 MG/1
100 CAPSULE ORAL THREE TIMES A DAY
Refills: 0 | Status: DISCONTINUED | OUTPATIENT
Start: 2025-07-18 | End: 2025-07-23

## 2025-07-18 RX ORDER — BACLOFEN 10 MG/20ML
20 INJECTION INTRATHECAL EVERY 8 HOURS
Refills: 0 | Status: DISCONTINUED | OUTPATIENT
Start: 2025-07-18 | End: 2025-07-23

## 2025-07-18 RX ORDER — LIDOCAINE HYDROCHLORIDE 20 MG/ML
1 JELLY TOPICAL DAILY
Refills: 0 | Status: DISCONTINUED | OUTPATIENT
Start: 2025-07-18 | End: 2025-07-23

## 2025-07-18 RX ORDER — INSULIN LISPRO 100 U/ML
INJECTION, SOLUTION INTRAVENOUS; SUBCUTANEOUS
Refills: 0 | Status: DISCONTINUED | OUTPATIENT
Start: 2025-07-18 | End: 2025-07-22

## 2025-07-18 RX ORDER — HYDROMORPHONE/SOD CHLOR,ISO/PF 2 MG/10 ML
1 SYRINGE (ML) INJECTION EVERY 4 HOURS
Refills: 0 | Status: DISCONTINUED | OUTPATIENT
Start: 2025-07-18 | End: 2025-07-19

## 2025-07-18 RX ORDER — DEXTROSE 50 % IN WATER 50 %
25 SYRINGE (ML) INTRAVENOUS ONCE
Refills: 0 | Status: DISCONTINUED | OUTPATIENT
Start: 2025-07-18 | End: 2025-07-19

## 2025-07-18 RX ORDER — MONTELUKAST SODIUM 10 MG/1
10 TABLET ORAL DAILY
Refills: 0 | Status: DISCONTINUED | OUTPATIENT
Start: 2025-07-18 | End: 2025-07-23

## 2025-07-18 RX ORDER — SODIUM CHLORIDE 9 G/1000ML
1000 INJECTION, SOLUTION INTRAVENOUS
Refills: 0 | Status: DISCONTINUED | OUTPATIENT
Start: 2025-07-18 | End: 2025-07-19

## 2025-07-18 RX ORDER — HYDROMORPHONE/SOD CHLOR,ISO/PF 2 MG/10 ML
1 SYRINGE (ML) INJECTION ONCE
Refills: 0 | Status: DISCONTINUED | OUTPATIENT
Start: 2025-07-18 | End: 2025-07-18

## 2025-07-18 RX ORDER — SENNA 187 MG
2 TABLET ORAL AT BEDTIME
Refills: 0 | Status: DISCONTINUED | OUTPATIENT
Start: 2025-07-18 | End: 2025-07-23

## 2025-07-18 RX ORDER — INSULIN GLARGINE-YFGN 100 [IU]/ML
30 INJECTION, SOLUTION SUBCUTANEOUS AT BEDTIME
Refills: 0 | Status: DISCONTINUED | OUTPATIENT
Start: 2025-07-18 | End: 2025-07-22

## 2025-07-18 RX ORDER — INSULIN GLARGINE-YFGN 100 [IU]/ML
40 INJECTION, SOLUTION SUBCUTANEOUS AT BEDTIME
Refills: 0 | Status: DISCONTINUED | OUTPATIENT
Start: 2025-07-18 | End: 2025-07-18

## 2025-07-18 RX ORDER — ENOXAPARIN SODIUM 100 MG/ML
40 INJECTION SUBCUTANEOUS EVERY 24 HOURS
Refills: 0 | Status: DISCONTINUED | OUTPATIENT
Start: 2025-07-18 | End: 2025-07-23

## 2025-07-18 RX ORDER — ALPRAZOLAM 0.5 MG
0.5 TABLET, EXTENDED RELEASE 24 HR ORAL THREE TIMES A DAY
Refills: 0 | Status: DISCONTINUED | OUTPATIENT
Start: 2025-07-18 | End: 2025-07-23

## 2025-07-18 RX ORDER — INSULIN LISPRO 100 U/ML
5 INJECTION, SOLUTION INTRAVENOUS; SUBCUTANEOUS
Refills: 0 | Status: DISCONTINUED | OUTPATIENT
Start: 2025-07-18 | End: 2025-07-22

## 2025-07-18 RX ORDER — POLYETHYLENE GLYCOL 3350 17 G/17G
17 POWDER, FOR SOLUTION ORAL DAILY
Refills: 0 | Status: DISCONTINUED | OUTPATIENT
Start: 2025-07-18 | End: 2025-07-23

## 2025-07-18 RX ORDER — DEXTROSE 50 % IN WATER 50 %
12.5 SYRINGE (ML) INTRAVENOUS ONCE
Refills: 0 | Status: DISCONTINUED | OUTPATIENT
Start: 2025-07-18 | End: 2025-07-19

## 2025-07-18 RX ORDER — DEXTROSE 50 % IN WATER 50 %
15 SYRINGE (ML) INTRAVENOUS ONCE
Refills: 0 | Status: DISCONTINUED | OUTPATIENT
Start: 2025-07-18 | End: 2025-07-19

## 2025-07-18 RX ADMIN — Medication 1 MILLIGRAM(S): at 22:16

## 2025-07-18 RX ADMIN — Medication 1 MILLIGRAM(S): at 18:06

## 2025-07-18 RX ADMIN — Medication 2 TABLET(S): at 22:16

## 2025-07-18 RX ADMIN — BACLOFEN 20 MILLIGRAM(S): 10 INJECTION INTRATHECAL at 22:15

## 2025-07-18 RX ADMIN — LIDOCAINE HYDROCHLORIDE 1 PATCH: 20 JELLY TOPICAL at 22:16

## 2025-07-18 RX ADMIN — ATORVASTATIN CALCIUM 20 MILLIGRAM(S): 80 TABLET, FILM COATED ORAL at 22:15

## 2025-07-18 RX ADMIN — INSULIN GLARGINE-YFGN 30 UNIT(S): 100 INJECTION, SOLUTION SUBCUTANEOUS at 22:29

## 2025-07-18 RX ADMIN — Medication 1 MILLIGRAM(S): at 19:08

## 2025-07-18 RX ADMIN — Medication 0.5 MILLIGRAM(S): at 23:21

## 2025-07-18 RX ADMIN — Medication 1 MILLIGRAM(S): at 18:41

## 2025-07-18 RX ADMIN — ENOXAPARIN SODIUM 40 MILLIGRAM(S): 100 INJECTION SUBCUTANEOUS at 22:16

## 2025-07-18 RX ADMIN — Medication 1 MILLIGRAM(S): at 17:39

## 2025-07-18 RX ADMIN — PREGABALIN 100 MILLIGRAM(S): 50 CAPSULE ORAL at 22:16

## 2025-07-19 ENCOUNTER — TRANSCRIPTION ENCOUNTER (OUTPATIENT)
Age: 55
End: 2025-07-19

## 2025-07-19 DIAGNOSIS — R07.81 PLEURODYNIA: ICD-10-CM

## 2025-07-19 LAB
ANION GAP SERPL CALC-SCNC: 6 MMOL/L — SIGNIFICANT CHANGE UP (ref 5–17)
BUN SERPL-MCNC: 20 MG/DL — HIGH (ref 7–18)
CALCIUM SERPL-MCNC: 8.3 MG/DL — LOW (ref 8.4–10.5)
CHLORIDE SERPL-SCNC: 101 MMOL/L — SIGNIFICANT CHANGE UP (ref 96–108)
CO2 SERPL-SCNC: 27 MMOL/L — SIGNIFICANT CHANGE UP (ref 22–31)
CREAT SERPL-MCNC: 1.26 MG/DL — SIGNIFICANT CHANGE UP (ref 0.5–1.3)
EGFR: 50 ML/MIN/1.73M2 — LOW
EGFR: 50 ML/MIN/1.73M2 — LOW
GLUCOSE BLDC GLUCOMTR-MCNC: 128 MG/DL — HIGH (ref 70–99)
GLUCOSE BLDC GLUCOMTR-MCNC: 150 MG/DL — HIGH (ref 70–99)
GLUCOSE BLDC GLUCOMTR-MCNC: 160 MG/DL — HIGH (ref 70–99)
GLUCOSE BLDC GLUCOMTR-MCNC: 170 MG/DL — HIGH (ref 70–99)
GLUCOSE SERPL-MCNC: 141 MG/DL — HIGH (ref 70–99)
HCT VFR BLD CALC: 32.8 % — LOW (ref 34.5–45)
HGB BLD-MCNC: 10.8 G/DL — LOW (ref 11.5–15.5)
MAGNESIUM SERPL-MCNC: 1.6 MG/DL — SIGNIFICANT CHANGE UP (ref 1.6–2.6)
MCHC RBC-ENTMCNC: 29.5 PG — SIGNIFICANT CHANGE UP (ref 27–34)
MCHC RBC-ENTMCNC: 32.9 G/DL — SIGNIFICANT CHANGE UP (ref 32–36)
MCV RBC AUTO: 89.6 FL — SIGNIFICANT CHANGE UP (ref 80–100)
NRBC BLD AUTO-RTO: 0 /100 WBCS — SIGNIFICANT CHANGE UP (ref 0–0)
PHOSPHATE SERPL-MCNC: 4.6 MG/DL — HIGH (ref 2.5–4.5)
PLATELET # BLD AUTO: 381 K/UL — SIGNIFICANT CHANGE UP (ref 150–400)
POTASSIUM SERPL-MCNC: 4.1 MMOL/L — SIGNIFICANT CHANGE UP (ref 3.5–5.3)
POTASSIUM SERPL-SCNC: 4.1 MMOL/L — SIGNIFICANT CHANGE UP (ref 3.5–5.3)
RBC # BLD: 3.66 M/UL — LOW (ref 3.8–5.2)
RBC # FLD: 13.4 % — SIGNIFICANT CHANGE UP (ref 10.3–14.5)
SODIUM SERPL-SCNC: 134 MMOL/L — LOW (ref 135–145)
WBC # BLD: 10.82 K/UL — HIGH (ref 3.8–10.5)
WBC # FLD AUTO: 10.82 K/UL — HIGH (ref 3.8–10.5)

## 2025-07-19 PROCEDURE — 36415 COLL VENOUS BLD VENIPUNCTURE: CPT

## 2025-07-19 PROCEDURE — 84484 ASSAY OF TROPONIN QUANT: CPT

## 2025-07-19 PROCEDURE — 84100 ASSAY OF PHOSPHORUS: CPT

## 2025-07-19 PROCEDURE — 80053 COMPREHEN METABOLIC PANEL: CPT

## 2025-07-19 PROCEDURE — 85610 PROTHROMBIN TIME: CPT

## 2025-07-19 PROCEDURE — 82962 GLUCOSE BLOOD TEST: CPT

## 2025-07-19 PROCEDURE — 85025 COMPLETE CBC W/AUTO DIFF WBC: CPT

## 2025-07-19 PROCEDURE — 83735 ASSAY OF MAGNESIUM: CPT

## 2025-07-19 PROCEDURE — 80048 BASIC METABOLIC PNL TOTAL CA: CPT

## 2025-07-19 PROCEDURE — 71045 X-RAY EXAM CHEST 1 VIEW: CPT

## 2025-07-19 PROCEDURE — 99233 SBSQ HOSP IP/OBS HIGH 50: CPT

## 2025-07-19 PROCEDURE — 85730 THROMBOPLASTIN TIME PARTIAL: CPT

## 2025-07-19 PROCEDURE — 99232 SBSQ HOSP IP/OBS MODERATE 35: CPT

## 2025-07-19 PROCEDURE — 85027 COMPLETE CBC AUTOMATED: CPT

## 2025-07-19 RX ORDER — SPIRONOLACTONE 25 MG
25 TABLET ORAL DAILY
Refills: 0 | Status: DISCONTINUED | OUTPATIENT
Start: 2025-07-19 | End: 2025-07-23

## 2025-07-19 RX ORDER — ACETAMINOPHEN 500 MG/5ML
1000 LIQUID (ML) ORAL EVERY 8 HOURS
Refills: 0 | Status: COMPLETED | OUTPATIENT
Start: 2025-07-19 | End: 2025-07-22

## 2025-07-19 RX ORDER — HYDROMORPHONE/SOD CHLOR,ISO/PF 2 MG/10 ML
1 SYRINGE (ML) INJECTION EVERY 4 HOURS
Refills: 0 | Status: DISCONTINUED | OUTPATIENT
Start: 2025-07-19 | End: 2025-07-19

## 2025-07-19 RX ORDER — OXYCODONE HYDROCHLORIDE 30 MG/1
25 TABLET ORAL EVERY 6 HOURS
Refills: 0 | Status: DISCONTINUED | OUTPATIENT
Start: 2025-07-19 | End: 2025-07-23

## 2025-07-19 RX ORDER — HYDROMORPHONE/SOD CHLOR,ISO/PF 2 MG/10 ML
1 SYRINGE (ML) INJECTION ONCE
Refills: 0 | Status: DISCONTINUED | OUTPATIENT
Start: 2025-07-19 | End: 2025-07-19

## 2025-07-19 RX ORDER — OXYCODONE HYDROCHLORIDE 30 MG/1
20 TABLET ORAL EVERY 6 HOURS
Refills: 0 | Status: DISCONTINUED | OUTPATIENT
Start: 2025-07-19 | End: 2025-07-19

## 2025-07-19 RX ORDER — HYDROMORPHONE/SOD CHLOR,ISO/PF 2 MG/10 ML
1 SYRINGE (ML) INJECTION EVERY 6 HOURS
Refills: 0 | Status: DISCONTINUED | OUTPATIENT
Start: 2025-07-19 | End: 2025-07-20

## 2025-07-19 RX ADMIN — Medication 4 MILLIGRAM(S): at 01:06

## 2025-07-19 RX ADMIN — OXYCODONE HYDROCHLORIDE 25 MILLIGRAM(S): 30 TABLET ORAL at 23:46

## 2025-07-19 RX ADMIN — Medication 40 MILLIGRAM(S): at 05:04

## 2025-07-19 RX ADMIN — Medication 1 MILLIGRAM(S): at 05:00

## 2025-07-19 RX ADMIN — INSULIN LISPRO 5 UNIT(S): 100 INJECTION, SOLUTION INTRAVENOUS; SUBCUTANEOUS at 12:28

## 2025-07-19 RX ADMIN — LIDOCAINE HYDROCHLORIDE 1 PATCH: 20 JELLY TOPICAL at 22:59

## 2025-07-19 RX ADMIN — ENOXAPARIN SODIUM 40 MILLIGRAM(S): 100 INJECTION SUBCUTANEOUS at 22:59

## 2025-07-19 RX ADMIN — Medication 1 MILLIGRAM(S): at 20:57

## 2025-07-19 RX ADMIN — MONTELUKAST SODIUM 10 MILLIGRAM(S): 10 TABLET ORAL at 12:26

## 2025-07-19 RX ADMIN — INSULIN GLARGINE-YFGN 30 UNIT(S): 100 INJECTION, SOLUTION SUBCUTANEOUS at 21:41

## 2025-07-19 RX ADMIN — PREGABALIN 100 MILLIGRAM(S): 50 CAPSULE ORAL at 05:05

## 2025-07-19 RX ADMIN — Medication 1 MILLIGRAM(S): at 14:51

## 2025-07-19 RX ADMIN — Medication 1 MILLIGRAM(S): at 05:05

## 2025-07-19 RX ADMIN — POLYETHYLENE GLYCOL 3350 17 GRAM(S): 17 POWDER, FOR SOLUTION ORAL at 12:27

## 2025-07-19 RX ADMIN — Medication 1 MILLIGRAM(S): at 13:51

## 2025-07-19 RX ADMIN — Medication 25 MILLIGRAM(S): at 17:39

## 2025-07-19 RX ADMIN — INSULIN LISPRO 5 UNIT(S): 100 INJECTION, SOLUTION INTRAVENOUS; SUBCUTANEOUS at 18:13

## 2025-07-19 RX ADMIN — Medication 1000 MILLIGRAM(S): at 14:49

## 2025-07-19 RX ADMIN — OXYCODONE HYDROCHLORIDE 25 MILLIGRAM(S): 30 TABLET ORAL at 18:39

## 2025-07-19 RX ADMIN — PREGABALIN 100 MILLIGRAM(S): 50 CAPSULE ORAL at 13:50

## 2025-07-19 RX ADMIN — LIDOCAINE HYDROCHLORIDE 1 PATCH: 20 JELLY TOPICAL at 12:16

## 2025-07-19 RX ADMIN — PREGABALIN 100 MILLIGRAM(S): 50 CAPSULE ORAL at 21:41

## 2025-07-19 RX ADMIN — Medication 1 MILLIGRAM(S): at 10:48

## 2025-07-19 RX ADMIN — OXYCODONE HYDROCHLORIDE 25 MILLIGRAM(S): 30 TABLET ORAL at 17:41

## 2025-07-19 RX ADMIN — Medication 0.5 MILLIGRAM(S): at 21:41

## 2025-07-19 RX ADMIN — Medication 1 MILLIGRAM(S): at 01:05

## 2025-07-19 RX ADMIN — INSULIN LISPRO 2: 100 INJECTION, SOLUTION INTRAVENOUS; SUBCUTANEOUS at 12:28

## 2025-07-19 RX ADMIN — Medication 1 MILLIGRAM(S): at 09:48

## 2025-07-19 RX ADMIN — INSULIN LISPRO 5 UNIT(S): 100 INJECTION, SOLUTION INTRAVENOUS; SUBCUTANEOUS at 08:37

## 2025-07-19 RX ADMIN — Medication 1 MILLIGRAM(S): at 19:57

## 2025-07-19 RX ADMIN — Medication 1000 MILLIGRAM(S): at 21:41

## 2025-07-19 RX ADMIN — LIDOCAINE HYDROCHLORIDE 1 PATCH: 20 JELLY TOPICAL at 08:27

## 2025-07-19 RX ADMIN — Medication 1000 MILLIGRAM(S): at 21:49

## 2025-07-19 RX ADMIN — Medication 0.5 MILLIGRAM(S): at 09:48

## 2025-07-19 RX ADMIN — ATORVASTATIN CALCIUM 20 MILLIGRAM(S): 80 TABLET, FILM COATED ORAL at 21:41

## 2025-07-19 RX ADMIN — Medication 2 TABLET(S): at 21:40

## 2025-07-19 RX ADMIN — Medication 1000 MILLIGRAM(S): at 13:49

## 2025-07-20 DIAGNOSIS — E66.01 MORBID (SEVERE) OBESITY DUE TO EXCESS CALORIES: ICD-10-CM

## 2025-07-20 LAB
ANION GAP SERPL CALC-SCNC: 6 MMOL/L — SIGNIFICANT CHANGE UP (ref 5–17)
BUN SERPL-MCNC: 16 MG/DL — SIGNIFICANT CHANGE UP (ref 7–18)
CALCIUM SERPL-MCNC: 8.3 MG/DL — LOW (ref 8.4–10.5)
CHLORIDE SERPL-SCNC: 100 MMOL/L — SIGNIFICANT CHANGE UP (ref 96–108)
CO2 SERPL-SCNC: 27 MMOL/L — SIGNIFICANT CHANGE UP (ref 22–31)
CREAT SERPL-MCNC: 1.16 MG/DL — SIGNIFICANT CHANGE UP (ref 0.5–1.3)
EGFR: 56 ML/MIN/1.73M2 — LOW
EGFR: 56 ML/MIN/1.73M2 — LOW
GLUCOSE BLDC GLUCOMTR-MCNC: 172 MG/DL — HIGH (ref 70–99)
GLUCOSE BLDC GLUCOMTR-MCNC: 179 MG/DL — HIGH (ref 70–99)
GLUCOSE BLDC GLUCOMTR-MCNC: 195 MG/DL — HIGH (ref 70–99)
GLUCOSE BLDC GLUCOMTR-MCNC: 198 MG/DL — HIGH (ref 70–99)
GLUCOSE SERPL-MCNC: 160 MG/DL — HIGH (ref 70–99)
HCT VFR BLD CALC: 33.3 % — LOW (ref 34.5–45)
HGB BLD-MCNC: 10.8 G/DL — LOW (ref 11.5–15.5)
MAGNESIUM SERPL-MCNC: 1.5 MG/DL — LOW (ref 1.6–2.6)
MCHC RBC-ENTMCNC: 29.6 PG — SIGNIFICANT CHANGE UP (ref 27–34)
MCHC RBC-ENTMCNC: 32.4 G/DL — SIGNIFICANT CHANGE UP (ref 32–36)
MCV RBC AUTO: 91.2 FL — SIGNIFICANT CHANGE UP (ref 80–100)
NRBC BLD AUTO-RTO: 0 /100 WBCS — SIGNIFICANT CHANGE UP (ref 0–0)
PHOSPHATE SERPL-MCNC: 4 MG/DL — SIGNIFICANT CHANGE UP (ref 2.5–4.5)
PLATELET # BLD AUTO: 375 K/UL — SIGNIFICANT CHANGE UP (ref 150–400)
POTASSIUM SERPL-MCNC: 4.4 MMOL/L — SIGNIFICANT CHANGE UP (ref 3.5–5.3)
POTASSIUM SERPL-SCNC: 4.4 MMOL/L — SIGNIFICANT CHANGE UP (ref 3.5–5.3)
RBC # BLD: 3.65 M/UL — LOW (ref 3.8–5.2)
RBC # FLD: 13 % — SIGNIFICANT CHANGE UP (ref 10.3–14.5)
SODIUM SERPL-SCNC: 133 MMOL/L — LOW (ref 135–145)
WBC # BLD: 8.08 K/UL — SIGNIFICANT CHANGE UP (ref 3.8–10.5)
WBC # FLD AUTO: 8.08 K/UL — SIGNIFICANT CHANGE UP (ref 3.8–10.5)

## 2025-07-20 PROCEDURE — 99232 SBSQ HOSP IP/OBS MODERATE 35: CPT | Mod: GC

## 2025-07-20 RX ORDER — HYDROMORPHONE/SOD CHLOR,ISO/PF 2 MG/10 ML
1 SYRINGE (ML) INJECTION EVERY 4 HOURS
Refills: 0 | Status: DISCONTINUED | OUTPATIENT
Start: 2025-07-20 | End: 2025-07-22

## 2025-07-20 RX ORDER — MAGNESIUM SULFATE 500 MG/ML
1 SYRINGE (ML) INJECTION ONCE
Refills: 0 | Status: COMPLETED | OUTPATIENT
Start: 2025-07-20 | End: 2025-07-20

## 2025-07-20 RX ADMIN — INSULIN LISPRO 2: 100 INJECTION, SOLUTION INTRAVENOUS; SUBCUTANEOUS at 08:10

## 2025-07-20 RX ADMIN — INSULIN LISPRO 5 UNIT(S): 100 INJECTION, SOLUTION INTRAVENOUS; SUBCUTANEOUS at 17:14

## 2025-07-20 RX ADMIN — Medication 1 MILLIGRAM(S): at 05:30

## 2025-07-20 RX ADMIN — Medication 1 MILLIGRAM(S): at 13:58

## 2025-07-20 RX ADMIN — Medication 25 MILLIGRAM(S): at 05:40

## 2025-07-20 RX ADMIN — OXYCODONE HYDROCHLORIDE 25 MILLIGRAM(S): 30 TABLET ORAL at 00:46

## 2025-07-20 RX ADMIN — LIDOCAINE HYDROCHLORIDE 1 PATCH: 20 JELLY TOPICAL at 07:30

## 2025-07-20 RX ADMIN — ATORVASTATIN CALCIUM 20 MILLIGRAM(S): 80 TABLET, FILM COATED ORAL at 21:43

## 2025-07-20 RX ADMIN — Medication 1 MILLIGRAM(S): at 21:41

## 2025-07-20 RX ADMIN — OXYCODONE HYDROCHLORIDE 25 MILLIGRAM(S): 30 TABLET ORAL at 09:14

## 2025-07-20 RX ADMIN — INSULIN LISPRO 2: 100 INJECTION, SOLUTION INTRAVENOUS; SUBCUTANEOUS at 12:43

## 2025-07-20 RX ADMIN — LIDOCAINE HYDROCHLORIDE 1 PATCH: 20 JELLY TOPICAL at 12:48

## 2025-07-20 RX ADMIN — PREGABALIN 100 MILLIGRAM(S): 50 CAPSULE ORAL at 05:40

## 2025-07-20 RX ADMIN — PREGABALIN 100 MILLIGRAM(S): 50 CAPSULE ORAL at 21:43

## 2025-07-20 RX ADMIN — INSULIN LISPRO 5 UNIT(S): 100 INJECTION, SOLUTION INTRAVENOUS; SUBCUTANEOUS at 08:10

## 2025-07-20 RX ADMIN — LIDOCAINE HYDROCHLORIDE 1 PATCH: 20 JELLY TOPICAL at 19:51

## 2025-07-20 RX ADMIN — INSULIN LISPRO 2: 100 INJECTION, SOLUTION INTRAVENOUS; SUBCUTANEOUS at 17:13

## 2025-07-20 RX ADMIN — OXYCODONE HYDROCHLORIDE 25 MILLIGRAM(S): 30 TABLET ORAL at 08:14

## 2025-07-20 RX ADMIN — Medication 1000 MILLIGRAM(S): at 05:40

## 2025-07-20 RX ADMIN — Medication 1000 MILLIGRAM(S): at 05:59

## 2025-07-20 RX ADMIN — INSULIN LISPRO 5 UNIT(S): 100 INJECTION, SOLUTION INTRAVENOUS; SUBCUTANEOUS at 12:44

## 2025-07-20 RX ADMIN — Medication 40 MILLIGRAM(S): at 07:21

## 2025-07-20 RX ADMIN — Medication 1 MILLIGRAM(S): at 17:13

## 2025-07-20 RX ADMIN — Medication 100 GRAM(S): at 09:31

## 2025-07-20 RX ADMIN — MONTELUKAST SODIUM 10 MILLIGRAM(S): 10 TABLET ORAL at 12:48

## 2025-07-20 RX ADMIN — Medication 1 MILLIGRAM(S): at 22:40

## 2025-07-20 RX ADMIN — Medication 0.5 MILLIGRAM(S): at 23:23

## 2025-07-20 RX ADMIN — LIDOCAINE HYDROCHLORIDE 1 PATCH: 20 JELLY TOPICAL at 10:40

## 2025-07-20 RX ADMIN — PREGABALIN 100 MILLIGRAM(S): 50 CAPSULE ORAL at 13:12

## 2025-07-20 RX ADMIN — Medication 2 TABLET(S): at 21:43

## 2025-07-20 RX ADMIN — Medication 1 MILLIGRAM(S): at 04:33

## 2025-07-20 RX ADMIN — POLYETHYLENE GLYCOL 3350 17 GRAM(S): 17 POWDER, FOR SOLUTION ORAL at 12:42

## 2025-07-20 RX ADMIN — Medication 1 MILLIGRAM(S): at 18:13

## 2025-07-20 RX ADMIN — INSULIN LISPRO 0: 100 INJECTION, SOLUTION INTRAVENOUS; SUBCUTANEOUS at 21:41

## 2025-07-20 RX ADMIN — INSULIN GLARGINE-YFGN 30 UNIT(S): 100 INJECTION, SOLUTION SUBCUTANEOUS at 21:42

## 2025-07-20 RX ADMIN — ENOXAPARIN SODIUM 40 MILLIGRAM(S): 100 INJECTION SUBCUTANEOUS at 21:42

## 2025-07-20 RX ADMIN — Medication 1 MILLIGRAM(S): at 12:58

## 2025-07-21 LAB
ANION GAP SERPL CALC-SCNC: 5 MMOL/L — SIGNIFICANT CHANGE UP (ref 5–17)
BUN SERPL-MCNC: 16 MG/DL — SIGNIFICANT CHANGE UP (ref 7–18)
CALCIUM SERPL-MCNC: 8.8 MG/DL — SIGNIFICANT CHANGE UP (ref 8.4–10.5)
CHLORIDE SERPL-SCNC: 100 MMOL/L — SIGNIFICANT CHANGE UP (ref 96–108)
CO2 SERPL-SCNC: 28 MMOL/L — SIGNIFICANT CHANGE UP (ref 22–31)
CREAT SERPL-MCNC: 1.06 MG/DL — SIGNIFICANT CHANGE UP (ref 0.5–1.3)
EGFR: 62 ML/MIN/1.73M2 — SIGNIFICANT CHANGE UP
EGFR: 62 ML/MIN/1.73M2 — SIGNIFICANT CHANGE UP
GLUCOSE BLDC GLUCOMTR-MCNC: 152 MG/DL — HIGH (ref 70–99)
GLUCOSE BLDC GLUCOMTR-MCNC: 181 MG/DL — HIGH (ref 70–99)
GLUCOSE BLDC GLUCOMTR-MCNC: 192 MG/DL — HIGH (ref 70–99)
GLUCOSE BLDC GLUCOMTR-MCNC: 206 MG/DL — HIGH (ref 70–99)
GLUCOSE SERPL-MCNC: 158 MG/DL — HIGH (ref 70–99)
HCT VFR BLD CALC: 33.9 % — LOW (ref 34.5–45)
HGB BLD-MCNC: 11.1 G/DL — LOW (ref 11.5–15.5)
MAGNESIUM SERPL-MCNC: 1.7 MG/DL — SIGNIFICANT CHANGE UP (ref 1.6–2.6)
MCHC RBC-ENTMCNC: 29.2 PG — SIGNIFICANT CHANGE UP (ref 27–34)
MCHC RBC-ENTMCNC: 32.7 G/DL — SIGNIFICANT CHANGE UP (ref 32–36)
MCV RBC AUTO: 89.2 FL — SIGNIFICANT CHANGE UP (ref 80–100)
NRBC BLD AUTO-RTO: 0 /100 WBCS — SIGNIFICANT CHANGE UP (ref 0–0)
PHOSPHATE SERPL-MCNC: 3.4 MG/DL — SIGNIFICANT CHANGE UP (ref 2.5–4.5)
PLATELET # BLD AUTO: 393 K/UL — SIGNIFICANT CHANGE UP (ref 150–400)
POTASSIUM SERPL-MCNC: 4.1 MMOL/L — SIGNIFICANT CHANGE UP (ref 3.5–5.3)
POTASSIUM SERPL-SCNC: 4.1 MMOL/L — SIGNIFICANT CHANGE UP (ref 3.5–5.3)
RBC # BLD: 3.8 M/UL — SIGNIFICANT CHANGE UP (ref 3.8–5.2)
RBC # FLD: 12.9 % — SIGNIFICANT CHANGE UP (ref 10.3–14.5)
SODIUM SERPL-SCNC: 133 MMOL/L — LOW (ref 135–145)
WBC # BLD: 8.8 K/UL — SIGNIFICANT CHANGE UP (ref 3.8–10.5)
WBC # FLD AUTO: 8.8 K/UL — SIGNIFICANT CHANGE UP (ref 3.8–10.5)

## 2025-07-21 PROCEDURE — 99232 SBSQ HOSP IP/OBS MODERATE 35: CPT | Mod: GC

## 2025-07-21 PROCEDURE — 99232 SBSQ HOSP IP/OBS MODERATE 35: CPT

## 2025-07-21 RX ORDER — POLYETHYLENE GLYCOL 3350 17 G/17G
17 POWDER, FOR SOLUTION ORAL ONCE
Refills: 0 | Status: COMPLETED | OUTPATIENT
Start: 2025-07-21 | End: 2025-07-21

## 2025-07-21 RX ORDER — BUMETANIDE 1 MG/1
2 TABLET ORAL DAILY
Refills: 0 | Status: DISCONTINUED | OUTPATIENT
Start: 2025-07-21 | End: 2025-07-23

## 2025-07-21 RX ADMIN — INSULIN LISPRO 5 UNIT(S): 100 INJECTION, SOLUTION INTRAVENOUS; SUBCUTANEOUS at 08:19

## 2025-07-21 RX ADMIN — Medication 1 MILLIGRAM(S): at 05:18

## 2025-07-21 RX ADMIN — ENOXAPARIN SODIUM 40 MILLIGRAM(S): 100 INJECTION SUBCUTANEOUS at 22:08

## 2025-07-21 RX ADMIN — Medication 1 MILLIGRAM(S): at 11:54

## 2025-07-21 RX ADMIN — Medication 25 MILLIGRAM(S): at 06:25

## 2025-07-21 RX ADMIN — Medication 1000 MILLIGRAM(S): at 13:33

## 2025-07-21 RX ADMIN — INSULIN GLARGINE-YFGN 30 UNIT(S): 100 INJECTION, SOLUTION SUBCUTANEOUS at 22:09

## 2025-07-21 RX ADMIN — Medication 1 MILLIGRAM(S): at 10:54

## 2025-07-21 RX ADMIN — PREGABALIN 100 MILLIGRAM(S): 50 CAPSULE ORAL at 22:08

## 2025-07-21 RX ADMIN — Medication 1 MILLIGRAM(S): at 15:01

## 2025-07-21 RX ADMIN — Medication 1000 MILLIGRAM(S): at 06:25

## 2025-07-21 RX ADMIN — BUMETANIDE 2 MILLIGRAM(S): 1 TABLET ORAL at 12:30

## 2025-07-21 RX ADMIN — OXYCODONE HYDROCHLORIDE 25 MILLIGRAM(S): 30 TABLET ORAL at 03:37

## 2025-07-21 RX ADMIN — MONTELUKAST SODIUM 10 MILLIGRAM(S): 10 TABLET ORAL at 12:26

## 2025-07-21 RX ADMIN — POLYETHYLENE GLYCOL 3350 17 GRAM(S): 17 POWDER, FOR SOLUTION ORAL at 12:25

## 2025-07-21 RX ADMIN — LIDOCAINE HYDROCHLORIDE 1 PATCH: 20 JELLY TOPICAL at 12:26

## 2025-07-21 RX ADMIN — OXYCODONE HYDROCHLORIDE 25 MILLIGRAM(S): 30 TABLET ORAL at 01:47

## 2025-07-21 RX ADMIN — PREGABALIN 100 MILLIGRAM(S): 50 CAPSULE ORAL at 13:32

## 2025-07-21 RX ADMIN — INSULIN LISPRO 2: 100 INJECTION, SOLUTION INTRAVENOUS; SUBCUTANEOUS at 11:45

## 2025-07-21 RX ADMIN — OXYCODONE HYDROCHLORIDE 25 MILLIGRAM(S): 30 TABLET ORAL at 09:50

## 2025-07-21 RX ADMIN — INSULIN LISPRO 2: 100 INJECTION, SOLUTION INTRAVENOUS; SUBCUTANEOUS at 08:17

## 2025-07-21 RX ADMIN — OXYCODONE HYDROCHLORIDE 25 MILLIGRAM(S): 30 TABLET ORAL at 20:00

## 2025-07-21 RX ADMIN — PREGABALIN 100 MILLIGRAM(S): 50 CAPSULE ORAL at 06:25

## 2025-07-21 RX ADMIN — OXYCODONE HYDROCHLORIDE 25 MILLIGRAM(S): 30 TABLET ORAL at 08:50

## 2025-07-21 RX ADMIN — Medication 1 MILLIGRAM(S): at 16:01

## 2025-07-21 RX ADMIN — INSULIN LISPRO 2: 100 INJECTION, SOLUTION INTRAVENOUS; SUBCUTANEOUS at 17:48

## 2025-07-21 RX ADMIN — Medication 40 MILLIGRAM(S): at 06:25

## 2025-07-21 RX ADMIN — LIDOCAINE HYDROCHLORIDE 1 PATCH: 20 JELLY TOPICAL at 00:04

## 2025-07-21 RX ADMIN — INSULIN LISPRO 5 UNIT(S): 100 INJECTION, SOLUTION INTRAVENOUS; SUBCUTANEOUS at 17:49

## 2025-07-21 RX ADMIN — LIDOCAINE HYDROCHLORIDE 1 PATCH: 20 JELLY TOPICAL at 22:00

## 2025-07-21 RX ADMIN — Medication 1000 MILLIGRAM(S): at 22:09

## 2025-07-21 RX ADMIN — BACLOFEN 20 MILLIGRAM(S): 10 INJECTION INTRATHECAL at 19:06

## 2025-07-21 RX ADMIN — Medication 2 TABLET(S): at 22:08

## 2025-07-21 RX ADMIN — Medication 1 MILLIGRAM(S): at 23:08

## 2025-07-21 RX ADMIN — Medication 1000 MILLIGRAM(S): at 14:33

## 2025-07-21 RX ADMIN — BACLOFEN 20 MILLIGRAM(S): 10 INJECTION INTRATHECAL at 08:51

## 2025-07-21 RX ADMIN — INSULIN LISPRO 5 UNIT(S): 100 INJECTION, SOLUTION INTRAVENOUS; SUBCUTANEOUS at 11:45

## 2025-07-21 RX ADMIN — ATORVASTATIN CALCIUM 20 MILLIGRAM(S): 80 TABLET, FILM COATED ORAL at 22:08

## 2025-07-21 RX ADMIN — Medication 1000 MILLIGRAM(S): at 22:50

## 2025-07-21 RX ADMIN — OXYCODONE HYDROCHLORIDE 25 MILLIGRAM(S): 30 TABLET ORAL at 19:06

## 2025-07-22 ENCOUNTER — TRANSCRIPTION ENCOUNTER (OUTPATIENT)
Age: 55
End: 2025-07-22

## 2025-07-22 DIAGNOSIS — F41.9 ANXIETY DISORDER, UNSPECIFIED: ICD-10-CM

## 2025-07-22 LAB
ALBUMIN SERPL ELPH-MCNC: 3.2 G/DL — LOW (ref 3.5–5)
ALP SERPL-CCNC: 155 U/L — HIGH (ref 40–120)
ALT FLD-CCNC: 63 U/L DA — HIGH (ref 10–60)
ANION GAP SERPL CALC-SCNC: 8 MMOL/L — SIGNIFICANT CHANGE UP (ref 5–17)
AST SERPL-CCNC: 55 U/L — HIGH (ref 10–40)
BASOPHILS # BLD AUTO: 0.04 K/UL — SIGNIFICANT CHANGE UP (ref 0–0.2)
BASOPHILS NFR BLD AUTO: 0.4 % — SIGNIFICANT CHANGE UP (ref 0–2)
BILIRUB SERPL-MCNC: 0.5 MG/DL — SIGNIFICANT CHANGE UP (ref 0.2–1.2)
BUN SERPL-MCNC: 23 MG/DL — HIGH (ref 7–18)
CALCIUM SERPL-MCNC: 9.1 MG/DL — SIGNIFICANT CHANGE UP (ref 8.4–10.5)
CHLORIDE SERPL-SCNC: 98 MMOL/L — SIGNIFICANT CHANGE UP (ref 96–108)
CO2 SERPL-SCNC: 26 MMOL/L — SIGNIFICANT CHANGE UP (ref 22–31)
CREAT SERPL-MCNC: 1.28 MG/DL — SIGNIFICANT CHANGE UP (ref 0.5–1.3)
EGFR: 49 ML/MIN/1.73M2 — LOW
EGFR: 49 ML/MIN/1.73M2 — LOW
EOSINOPHIL # BLD AUTO: 0.18 K/UL — SIGNIFICANT CHANGE UP (ref 0–0.5)
EOSINOPHIL NFR BLD AUTO: 1.9 % — SIGNIFICANT CHANGE UP (ref 0–6)
GLUCOSE BLDC GLUCOMTR-MCNC: 191 MG/DL — HIGH (ref 70–99)
GLUCOSE BLDC GLUCOMTR-MCNC: 242 MG/DL — HIGH (ref 70–99)
GLUCOSE BLDC GLUCOMTR-MCNC: 255 MG/DL — HIGH (ref 70–99)
GLUCOSE BLDC GLUCOMTR-MCNC: 267 MG/DL — HIGH (ref 70–99)
GLUCOSE SERPL-MCNC: 247 MG/DL — HIGH (ref 70–99)
HCT VFR BLD CALC: 35 % — SIGNIFICANT CHANGE UP (ref 34.5–45)
HGB BLD-MCNC: 11.6 G/DL — SIGNIFICANT CHANGE UP (ref 11.5–15.5)
IMM GRANULOCYTES NFR BLD AUTO: 0.2 % — SIGNIFICANT CHANGE UP (ref 0–0.9)
LYMPHOCYTES # BLD AUTO: 3.4 K/UL — HIGH (ref 1–3.3)
LYMPHOCYTES # BLD AUTO: 36.6 % — SIGNIFICANT CHANGE UP (ref 13–44)
MAGNESIUM SERPL-MCNC: 1.6 MG/DL — SIGNIFICANT CHANGE UP (ref 1.6–2.6)
MCHC RBC-ENTMCNC: 29.8 PG — SIGNIFICANT CHANGE UP (ref 27–34)
MCHC RBC-ENTMCNC: 33.1 G/DL — SIGNIFICANT CHANGE UP (ref 32–36)
MCV RBC AUTO: 90 FL — SIGNIFICANT CHANGE UP (ref 80–100)
MONOCYTES # BLD AUTO: 0.56 K/UL — SIGNIFICANT CHANGE UP (ref 0–0.9)
MONOCYTES NFR BLD AUTO: 6 % — SIGNIFICANT CHANGE UP (ref 2–14)
NEUTROPHILS # BLD AUTO: 5.1 K/UL — SIGNIFICANT CHANGE UP (ref 1.8–7.4)
NEUTROPHILS NFR BLD AUTO: 54.9 % — SIGNIFICANT CHANGE UP (ref 43–77)
NRBC BLD AUTO-RTO: 0 /100 WBCS — SIGNIFICANT CHANGE UP (ref 0–0)
PHOSPHATE SERPL-MCNC: 4.3 MG/DL — SIGNIFICANT CHANGE UP (ref 2.5–4.5)
PLATELET # BLD AUTO: 411 K/UL — HIGH (ref 150–400)
POTASSIUM SERPL-MCNC: 4 MMOL/L — SIGNIFICANT CHANGE UP (ref 3.5–5.3)
POTASSIUM SERPL-SCNC: 4 MMOL/L — SIGNIFICANT CHANGE UP (ref 3.5–5.3)
PROT SERPL-MCNC: 7.5 G/DL — SIGNIFICANT CHANGE UP (ref 6–8.3)
RBC # BLD: 3.89 M/UL — SIGNIFICANT CHANGE UP (ref 3.8–5.2)
RBC # FLD: 13 % — SIGNIFICANT CHANGE UP (ref 10.3–14.5)
SODIUM SERPL-SCNC: 132 MMOL/L — LOW (ref 135–145)
WBC # BLD: 9.3 K/UL — SIGNIFICANT CHANGE UP (ref 3.8–10.5)
WBC # FLD AUTO: 9.3 K/UL — SIGNIFICANT CHANGE UP (ref 3.8–10.5)

## 2025-07-22 PROCEDURE — 84484 ASSAY OF TROPONIN QUANT: CPT

## 2025-07-22 PROCEDURE — 84100 ASSAY OF PHOSPHORUS: CPT

## 2025-07-22 PROCEDURE — 80048 BASIC METABOLIC PNL TOTAL CA: CPT

## 2025-07-22 PROCEDURE — 80053 COMPREHEN METABOLIC PANEL: CPT

## 2025-07-22 PROCEDURE — 85025 COMPLETE CBC W/AUTO DIFF WBC: CPT

## 2025-07-22 PROCEDURE — 83735 ASSAY OF MAGNESIUM: CPT

## 2025-07-22 PROCEDURE — 93005 ELECTROCARDIOGRAM TRACING: CPT

## 2025-07-22 PROCEDURE — 97162 PT EVAL MOD COMPLEX 30 MIN: CPT

## 2025-07-22 PROCEDURE — 36415 COLL VENOUS BLD VENIPUNCTURE: CPT

## 2025-07-22 PROCEDURE — 99232 SBSQ HOSP IP/OBS MODERATE 35: CPT

## 2025-07-22 PROCEDURE — 82962 GLUCOSE BLOOD TEST: CPT

## 2025-07-22 PROCEDURE — 85730 THROMBOPLASTIN TIME PARTIAL: CPT

## 2025-07-22 PROCEDURE — 97530 THERAPEUTIC ACTIVITIES: CPT

## 2025-07-22 PROCEDURE — 99233 SBSQ HOSP IP/OBS HIGH 50: CPT | Mod: GC

## 2025-07-22 PROCEDURE — 85027 COMPLETE CBC AUTOMATED: CPT

## 2025-07-22 PROCEDURE — 71045 X-RAY EXAM CHEST 1 VIEW: CPT

## 2025-07-22 PROCEDURE — 97116 GAIT TRAINING THERAPY: CPT

## 2025-07-22 PROCEDURE — 85610 PROTHROMBIN TIME: CPT

## 2025-07-22 RX ORDER — INSULIN GLARGINE-YFGN 100 [IU]/ML
33 INJECTION, SOLUTION SUBCUTANEOUS AT BEDTIME
Refills: 0 | Status: DISCONTINUED | OUTPATIENT
Start: 2025-07-22 | End: 2025-07-22

## 2025-07-22 RX ORDER — INSULIN LISPRO 100 U/ML
INJECTION, SOLUTION INTRAVENOUS; SUBCUTANEOUS AT BEDTIME
Refills: 0 | Status: DISCONTINUED | OUTPATIENT
Start: 2025-07-22 | End: 2025-07-23

## 2025-07-22 RX ORDER — INSULIN LISPRO 100 U/ML
10 INJECTION, SOLUTION INTRAVENOUS; SUBCUTANEOUS
Refills: 0 | Status: DISCONTINUED | OUTPATIENT
Start: 2025-07-22 | End: 2025-07-23

## 2025-07-22 RX ORDER — ONDANSETRON HCL/PF 4 MG/2 ML
4 VIAL (ML) INJECTION ONCE
Refills: 0 | Status: COMPLETED | OUTPATIENT
Start: 2025-07-22 | End: 2025-07-22

## 2025-07-22 RX ORDER — HYDROMORPHONE/SOD CHLOR,ISO/PF 2 MG/10 ML
0.5 SYRINGE (ML) INJECTION EVERY 4 HOURS
Refills: 0 | Status: DISCONTINUED | OUTPATIENT
Start: 2025-07-22 | End: 2025-07-23

## 2025-07-22 RX ORDER — HYDROMORPHONE/SOD CHLOR,ISO/PF 2 MG/10 ML
0.5 SYRINGE (ML) INJECTION EVERY 4 HOURS
Refills: 0 | Status: DISCONTINUED | OUTPATIENT
Start: 2025-07-22 | End: 2025-07-22

## 2025-07-22 RX ORDER — MAGNESIUM SULFATE 500 MG/ML
2 SYRINGE (ML) INJECTION ONCE
Refills: 0 | Status: COMPLETED | OUTPATIENT
Start: 2025-07-22 | End: 2025-07-22

## 2025-07-22 RX ORDER — INSULIN LISPRO 100 U/ML
6 INJECTION, SOLUTION INTRAVENOUS; SUBCUTANEOUS
Refills: 0 | Status: DISCONTINUED | OUTPATIENT
Start: 2025-07-22 | End: 2025-07-22

## 2025-07-22 RX ORDER — INSULIN GLARGINE-YFGN 100 [IU]/ML
50 INJECTION, SOLUTION SUBCUTANEOUS AT BEDTIME
Refills: 0 | Status: DISCONTINUED | OUTPATIENT
Start: 2025-07-22 | End: 2025-07-23

## 2025-07-22 RX ORDER — INSULIN LISPRO 100 U/ML
INJECTION, SOLUTION INTRAVENOUS; SUBCUTANEOUS
Refills: 0 | Status: DISCONTINUED | OUTPATIENT
Start: 2025-07-22 | End: 2025-07-23

## 2025-07-22 RX ADMIN — PREGABALIN 100 MILLIGRAM(S): 50 CAPSULE ORAL at 21:21

## 2025-07-22 RX ADMIN — MONTELUKAST SODIUM 10 MILLIGRAM(S): 10 TABLET ORAL at 13:05

## 2025-07-22 RX ADMIN — BACLOFEN 20 MILLIGRAM(S): 10 INJECTION INTRATHECAL at 05:47

## 2025-07-22 RX ADMIN — Medication 1 MILLIGRAM(S): at 00:05

## 2025-07-22 RX ADMIN — Medication 0.5 MILLIGRAM(S): at 16:30

## 2025-07-22 RX ADMIN — PREGABALIN 100 MILLIGRAM(S): 50 CAPSULE ORAL at 05:17

## 2025-07-22 RX ADMIN — OXYCODONE HYDROCHLORIDE 25 MILLIGRAM(S): 30 TABLET ORAL at 07:35

## 2025-07-22 RX ADMIN — Medication 4 MILLIGRAM(S): at 13:47

## 2025-07-22 RX ADMIN — INSULIN LISPRO 6 UNIT(S): 100 INJECTION, SOLUTION INTRAVENOUS; SUBCUTANEOUS at 12:11

## 2025-07-22 RX ADMIN — POLYETHYLENE GLYCOL 3350 17 GRAM(S): 17 POWDER, FOR SOLUTION ORAL at 13:05

## 2025-07-22 RX ADMIN — BUMETANIDE 2 MILLIGRAM(S): 1 TABLET ORAL at 05:17

## 2025-07-22 RX ADMIN — INSULIN LISPRO 10 UNIT(S): 100 INJECTION, SOLUTION INTRAVENOUS; SUBCUTANEOUS at 17:54

## 2025-07-22 RX ADMIN — ATORVASTATIN CALCIUM 20 MILLIGRAM(S): 80 TABLET, FILM COATED ORAL at 21:21

## 2025-07-22 RX ADMIN — Medication 1000 MILLIGRAM(S): at 05:17

## 2025-07-22 RX ADMIN — OXYCODONE HYDROCHLORIDE 25 MILLIGRAM(S): 30 TABLET ORAL at 22:20

## 2025-07-22 RX ADMIN — Medication 40 MILLIGRAM(S): at 05:17

## 2025-07-22 RX ADMIN — LIDOCAINE HYDROCHLORIDE 1 PATCH: 20 JELLY TOPICAL at 13:05

## 2025-07-22 RX ADMIN — Medication 25 MILLIGRAM(S): at 05:17

## 2025-07-22 RX ADMIN — Medication 2 TABLET(S): at 21:21

## 2025-07-22 RX ADMIN — LIDOCAINE HYDROCHLORIDE 1 PATCH: 20 JELLY TOPICAL at 21:11

## 2025-07-22 RX ADMIN — ENOXAPARIN SODIUM 40 MILLIGRAM(S): 100 INJECTION SUBCUTANEOUS at 21:20

## 2025-07-22 RX ADMIN — OXYCODONE HYDROCHLORIDE 25 MILLIGRAM(S): 30 TABLET ORAL at 08:35

## 2025-07-22 RX ADMIN — INSULIN LISPRO 6: 100 INJECTION, SOLUTION INTRAVENOUS; SUBCUTANEOUS at 08:42

## 2025-07-22 RX ADMIN — INSULIN GLARGINE-YFGN 50 UNIT(S): 100 INJECTION, SOLUTION SUBCUTANEOUS at 22:34

## 2025-07-22 RX ADMIN — Medication 1000 MILLIGRAM(S): at 05:20

## 2025-07-22 RX ADMIN — Medication 0.5 MILLIGRAM(S): at 17:30

## 2025-07-22 RX ADMIN — PREGABALIN 100 MILLIGRAM(S): 50 CAPSULE ORAL at 13:46

## 2025-07-22 RX ADMIN — INSULIN LISPRO 4: 100 INJECTION, SOLUTION INTRAVENOUS; SUBCUTANEOUS at 17:54

## 2025-07-22 RX ADMIN — INSULIN LISPRO 6: 100 INJECTION, SOLUTION INTRAVENOUS; SUBCUTANEOUS at 12:10

## 2025-07-22 RX ADMIN — LIDOCAINE HYDROCHLORIDE 1 PATCH: 20 JELLY TOPICAL at 00:25

## 2025-07-22 RX ADMIN — Medication 25 GRAM(S): at 08:58

## 2025-07-22 RX ADMIN — OXYCODONE HYDROCHLORIDE 25 MILLIGRAM(S): 30 TABLET ORAL at 21:21

## 2025-07-23 ENCOUNTER — APPOINTMENT (OUTPATIENT)
Dept: CARDIOLOGY | Facility: CLINIC | Age: 55
End: 2025-07-23

## 2025-07-23 VITALS
OXYGEN SATURATION: 95 % | TEMPERATURE: 98 F | WEIGHT: 293 LBS | DIASTOLIC BLOOD PRESSURE: 68 MMHG | SYSTOLIC BLOOD PRESSURE: 123 MMHG | HEART RATE: 61 BPM | RESPIRATION RATE: 17 BRPM

## 2025-07-23 DIAGNOSIS — F41.9 ANXIETY DISORDER, UNSPECIFIED: ICD-10-CM

## 2025-07-23 LAB
GLUCOSE BLDC GLUCOMTR-MCNC: 220 MG/DL — HIGH (ref 70–99)
GLUCOSE BLDC GLUCOMTR-MCNC: 242 MG/DL — HIGH (ref 70–99)
GLUCOSE BLDC GLUCOMTR-MCNC: 349 MG/DL — HIGH (ref 70–99)
GLUCOSE BLDC GLUCOMTR-MCNC: 461 MG/DL — CRITICAL HIGH (ref 70–99)

## 2025-07-23 PROCEDURE — 96374 THER/PROPH/DIAG INJ IV PUSH: CPT

## 2025-07-23 PROCEDURE — 71045 X-RAY EXAM CHEST 1 VIEW: CPT

## 2025-07-23 PROCEDURE — 99232 SBSQ HOSP IP/OBS MODERATE 35: CPT

## 2025-07-23 PROCEDURE — 93005 ELECTROCARDIOGRAM TRACING: CPT

## 2025-07-23 PROCEDURE — 80048 BASIC METABOLIC PNL TOTAL CA: CPT

## 2025-07-23 PROCEDURE — 82962 GLUCOSE BLOOD TEST: CPT

## 2025-07-23 PROCEDURE — 84100 ASSAY OF PHOSPHORUS: CPT

## 2025-07-23 PROCEDURE — 36415 COLL VENOUS BLD VENIPUNCTURE: CPT

## 2025-07-23 PROCEDURE — 80053 COMPREHEN METABOLIC PANEL: CPT

## 2025-07-23 PROCEDURE — 99239 HOSP IP/OBS DSCHRG MGMT >30: CPT

## 2025-07-23 PROCEDURE — 83735 ASSAY OF MAGNESIUM: CPT

## 2025-07-23 PROCEDURE — 85730 THROMBOPLASTIN TIME PARTIAL: CPT

## 2025-07-23 PROCEDURE — 85027 COMPLETE CBC AUTOMATED: CPT

## 2025-07-23 PROCEDURE — 97530 THERAPEUTIC ACTIVITIES: CPT

## 2025-07-23 PROCEDURE — 84484 ASSAY OF TROPONIN QUANT: CPT

## 2025-07-23 PROCEDURE — 97162 PT EVAL MOD COMPLEX 30 MIN: CPT

## 2025-07-23 PROCEDURE — 97116 GAIT TRAINING THERAPY: CPT

## 2025-07-23 PROCEDURE — 85025 COMPLETE CBC W/AUTO DIFF WBC: CPT

## 2025-07-23 PROCEDURE — 85610 PROTHROMBIN TIME: CPT

## 2025-07-23 PROCEDURE — 99285 EMERGENCY DEPT VISIT HI MDM: CPT

## 2025-07-23 RX ORDER — OXYCODONE HYDROCHLORIDE 30 MG/1
5 TABLET ORAL
Qty: 100 | Refills: 0
Start: 2025-07-23 | End: 2025-07-27

## 2025-07-23 RX ORDER — SENNA 187 MG
2 TABLET ORAL
Qty: 60 | Refills: 0
Start: 2025-07-23 | End: 2025-08-21

## 2025-07-23 RX ORDER — OXYCODONE HYDROCHLORIDE 30 MG/1
1 TABLET ORAL
Qty: 20 | Refills: 0
Start: 2025-07-23 | End: 2025-07-27

## 2025-07-23 RX ORDER — POLYETHYLENE GLYCOL 3350 17 G/17G
17 POWDER, FOR SOLUTION ORAL
Qty: 510 | Refills: 0
Start: 2025-07-23 | End: 2025-08-21

## 2025-07-23 RX ORDER — POLYETHYLENE GLYCOL 3350 17 G/17G
17 POWDER, FOR SOLUTION ORAL ONCE
Refills: 0 | Status: COMPLETED | OUTPATIENT
Start: 2025-07-23 | End: 2025-07-23

## 2025-07-23 RX ORDER — NALOXONE HYDROCHLORIDE 0.4 MG/ML
1 INJECTION, SOLUTION INTRAMUSCULAR; INTRAVENOUS; SUBCUTANEOUS
Qty: 1 | Refills: 0
Start: 2025-07-23

## 2025-07-23 RX ORDER — INSULIN LISPRO 100 U/ML
14 INJECTION, SOLUTION INTRAVENOUS; SUBCUTANEOUS
Refills: 0 | Status: DISCONTINUED | OUTPATIENT
Start: 2025-07-23 | End: 2025-07-23

## 2025-07-23 RX ADMIN — LIDOCAINE HYDROCHLORIDE 1 PATCH: 20 JELLY TOPICAL at 11:46

## 2025-07-23 RX ADMIN — INSULIN LISPRO 8: 100 INJECTION, SOLUTION INTRAVENOUS; SUBCUTANEOUS at 12:17

## 2025-07-23 RX ADMIN — POLYETHYLENE GLYCOL 3350 17 GRAM(S): 17 POWDER, FOR SOLUTION ORAL at 12:31

## 2025-07-23 RX ADMIN — PREGABALIN 100 MILLIGRAM(S): 50 CAPSULE ORAL at 15:31

## 2025-07-23 RX ADMIN — INSULIN LISPRO 14 UNIT(S): 100 INJECTION, SOLUTION INTRAVENOUS; SUBCUTANEOUS at 12:18

## 2025-07-23 RX ADMIN — OXYCODONE HYDROCHLORIDE 25 MILLIGRAM(S): 30 TABLET ORAL at 10:01

## 2025-07-23 RX ADMIN — Medication 0.5 MILLIGRAM(S): at 17:55

## 2025-07-23 RX ADMIN — Medication 0.5 MILLIGRAM(S): at 12:20

## 2025-07-23 RX ADMIN — Medication 40 MILLIGRAM(S): at 06:49

## 2025-07-23 RX ADMIN — MONTELUKAST SODIUM 10 MILLIGRAM(S): 10 TABLET ORAL at 11:46

## 2025-07-23 RX ADMIN — LIDOCAINE HYDROCHLORIDE 1 PATCH: 20 JELLY TOPICAL at 01:00

## 2025-07-23 RX ADMIN — Medication 0.5 MILLIGRAM(S): at 16:59

## 2025-07-23 RX ADMIN — POLYETHYLENE GLYCOL 3350 17 GRAM(S): 17 POWDER, FOR SOLUTION ORAL at 11:47

## 2025-07-23 RX ADMIN — Medication 25 MILLIGRAM(S): at 07:04

## 2025-07-23 RX ADMIN — Medication 0.5 MILLIGRAM(S): at 08:05

## 2025-07-23 RX ADMIN — INSULIN LISPRO 10 UNIT(S): 100 INJECTION, SOLUTION INTRAVENOUS; SUBCUTANEOUS at 08:10

## 2025-07-23 RX ADMIN — INSULIN LISPRO 4: 100 INJECTION, SOLUTION INTRAVENOUS; SUBCUTANEOUS at 08:12

## 2025-07-23 RX ADMIN — Medication 0.5 MILLIGRAM(S): at 03:09

## 2025-07-23 RX ADMIN — PREGABALIN 100 MILLIGRAM(S): 50 CAPSULE ORAL at 06:50

## 2025-07-23 RX ADMIN — Medication 0.5 MILLIGRAM(S): at 02:43

## 2025-07-23 RX ADMIN — Medication 0.5 MILLIGRAM(S): at 14:10

## 2025-07-23 RX ADMIN — Medication 0.5 MILLIGRAM(S): at 08:40

## 2025-07-23 RX ADMIN — INSULIN LISPRO 4: 100 INJECTION, SOLUTION INTRAVENOUS; SUBCUTANEOUS at 17:27

## 2025-07-23 RX ADMIN — OXYCODONE HYDROCHLORIDE 25 MILLIGRAM(S): 30 TABLET ORAL at 11:37

## 2025-07-23 RX ADMIN — INSULIN LISPRO 14 UNIT(S): 100 INJECTION, SOLUTION INTRAVENOUS; SUBCUTANEOUS at 17:28

## 2025-07-23 RX ADMIN — BUMETANIDE 2 MILLIGRAM(S): 1 TABLET ORAL at 06:49

## 2025-07-24 ENCOUNTER — NON-APPOINTMENT (OUTPATIENT)
Age: 55
End: 2025-07-24

## 2025-07-24 RX ORDER — ALPRAZOLAM 0.5 MG/1
0.5 TABLET ORAL 3 TIMES DAILY
Refills: 0 | Status: ACTIVE | COMMUNITY
Start: 2025-07-24

## 2025-07-24 RX ORDER — SENNOSIDES 8.6 MG/1
8.6 TABLET ORAL
Refills: 0 | Status: ACTIVE | COMMUNITY
Start: 2025-07-24

## 2025-07-24 RX ORDER — POLYETHYLENE GLYCOL 3350 17 G/17G
17 POWDER, FOR SOLUTION ORAL DAILY
Refills: 0 | Status: ACTIVE | COMMUNITY
Start: 2025-07-24

## 2025-07-24 RX ORDER — SPIRONOLACTONE 25 MG/1
25 TABLET ORAL
Refills: 0 | Status: ACTIVE | COMMUNITY
Start: 2025-07-24

## 2025-07-30 ENCOUNTER — TRANSCRIPTION ENCOUNTER (OUTPATIENT)
Age: 55
End: 2025-07-30

## 2025-07-30 ENCOUNTER — INPATIENT (INPATIENT)
Facility: HOSPITAL | Age: 55
LOS: 4 days | Discharge: HOME | DRG: 74 | End: 2025-08-04
Attending: STUDENT IN AN ORGANIZED HEALTH CARE EDUCATION/TRAINING PROGRAM | Admitting: STUDENT IN AN ORGANIZED HEALTH CARE EDUCATION/TRAINING PROGRAM
Payer: MEDICARE

## 2025-07-30 VITALS
HEART RATE: 65 BPM | WEIGHT: 293 LBS | OXYGEN SATURATION: 95 % | TEMPERATURE: 99 F | SYSTOLIC BLOOD PRESSURE: 121 MMHG | HEIGHT: 68 IN | RESPIRATION RATE: 18 BRPM | DIASTOLIC BLOOD PRESSURE: 68 MMHG

## 2025-07-30 DIAGNOSIS — Z96.82 PRESENCE OF NEUROSTIMULATOR: Chronic | ICD-10-CM

## 2025-07-30 DIAGNOSIS — Z98.890 OTHER SPECIFIED POSTPROCEDURAL STATES: Chronic | ICD-10-CM

## 2025-07-30 DIAGNOSIS — Z82.49 FAMILY HISTORY OF ISCHEMIC HEART DISEASE AND OTHER DISEASES OF THE CIRCULATORY SYSTEM: ICD-10-CM

## 2025-07-30 DIAGNOSIS — Z90.49 ACQUIRED ABSENCE OF OTHER SPECIFIED PARTS OF DIGESTIVE TRACT: Chronic | ICD-10-CM

## 2025-07-30 LAB
ALBUMIN SERPL ELPH-MCNC: 3.3 G/DL — LOW (ref 3.5–5)
ALP SERPL-CCNC: 123 U/L — HIGH (ref 40–120)
ALT FLD-CCNC: 24 U/L DA — SIGNIFICANT CHANGE UP (ref 10–60)
ANION GAP SERPL CALC-SCNC: 4 MMOL/L — LOW (ref 5–17)
AST SERPL-CCNC: 15 U/L — SIGNIFICANT CHANGE UP (ref 10–40)
BASOPHILS # BLD AUTO: 0.03 K/UL — SIGNIFICANT CHANGE UP (ref 0–0.2)
BASOPHILS NFR BLD AUTO: 0.2 % — SIGNIFICANT CHANGE UP (ref 0–2)
BILIRUB SERPL-MCNC: 0.4 MG/DL — SIGNIFICANT CHANGE UP (ref 0.2–1.2)
BUN SERPL-MCNC: 20 MG/DL — HIGH (ref 7–18)
CALCIUM SERPL-MCNC: 8.9 MG/DL — SIGNIFICANT CHANGE UP (ref 8.4–10.5)
CHLORIDE SERPL-SCNC: 100 MMOL/L — SIGNIFICANT CHANGE UP (ref 96–108)
CO2 SERPL-SCNC: 31 MMOL/L — SIGNIFICANT CHANGE UP (ref 22–31)
CREAT SERPL-MCNC: 1.47 MG/DL — HIGH (ref 0.5–1.3)
EGFR: 42 ML/MIN/1.73M2 — LOW
EGFR: 42 ML/MIN/1.73M2 — LOW
EOSINOPHIL # BLD AUTO: 0.18 K/UL — SIGNIFICANT CHANGE UP (ref 0–0.5)
EOSINOPHIL NFR BLD AUTO: 1.4 % — SIGNIFICANT CHANGE UP (ref 0–6)
GLUCOSE SERPL-MCNC: 137 MG/DL — HIGH (ref 70–99)
HCT VFR BLD CALC: 35.7 % — SIGNIFICANT CHANGE UP (ref 34.5–45)
HGB BLD-MCNC: 11.7 G/DL — SIGNIFICANT CHANGE UP (ref 11.5–15.5)
IMM GRANULOCYTES NFR BLD AUTO: 0.3 % — SIGNIFICANT CHANGE UP (ref 0–0.9)
LYMPHOCYTES # BLD AUTO: 34.5 % — SIGNIFICANT CHANGE UP (ref 13–44)
LYMPHOCYTES # BLD AUTO: 4.34 K/UL — HIGH (ref 1–3.3)
MCHC RBC-ENTMCNC: 29.8 PG — SIGNIFICANT CHANGE UP (ref 27–34)
MCHC RBC-ENTMCNC: 32.8 G/DL — SIGNIFICANT CHANGE UP (ref 32–36)
MCV RBC AUTO: 91.1 FL — SIGNIFICANT CHANGE UP (ref 80–100)
MONOCYTES # BLD AUTO: 0.78 K/UL — SIGNIFICANT CHANGE UP (ref 0–0.9)
MONOCYTES NFR BLD AUTO: 6.2 % — SIGNIFICANT CHANGE UP (ref 2–14)
NEUTROPHILS # BLD AUTO: 7.21 K/UL — SIGNIFICANT CHANGE UP (ref 1.8–7.4)
NEUTROPHILS NFR BLD AUTO: 57.4 % — SIGNIFICANT CHANGE UP (ref 43–77)
NRBC BLD AUTO-RTO: 0 /100 WBCS — SIGNIFICANT CHANGE UP (ref 0–0)
PLATELET # BLD AUTO: 398 K/UL — SIGNIFICANT CHANGE UP (ref 150–400)
POTASSIUM SERPL-MCNC: 4.4 MMOL/L — SIGNIFICANT CHANGE UP (ref 3.5–5.3)
POTASSIUM SERPL-SCNC: 4.4 MMOL/L — SIGNIFICANT CHANGE UP (ref 3.5–5.3)
PROT SERPL-MCNC: 7.7 G/DL — SIGNIFICANT CHANGE UP (ref 6–8.3)
RBC # BLD: 3.92 M/UL — SIGNIFICANT CHANGE UP (ref 3.8–5.2)
RBC # FLD: 13.2 % — SIGNIFICANT CHANGE UP (ref 10.3–14.5)
SODIUM SERPL-SCNC: 135 MMOL/L — SIGNIFICANT CHANGE UP (ref 135–145)
WBC # BLD: 12.58 K/UL — HIGH (ref 3.8–10.5)
WBC # FLD AUTO: 12.58 K/UL — HIGH (ref 3.8–10.5)

## 2025-07-30 PROCEDURE — 71045 X-RAY EXAM CHEST 1 VIEW: CPT | Mod: 26

## 2025-07-30 PROCEDURE — 99223 1ST HOSP IP/OBS HIGH 75: CPT | Mod: GC

## 2025-07-30 PROCEDURE — 36415 COLL VENOUS BLD VENIPUNCTURE: CPT

## 2025-07-30 PROCEDURE — 85025 COMPLETE CBC W/AUTO DIFF WBC: CPT

## 2025-07-30 PROCEDURE — 80053 COMPREHEN METABOLIC PANEL: CPT

## 2025-07-30 PROCEDURE — 99285 EMERGENCY DEPT VISIT HI MDM: CPT

## 2025-07-30 PROCEDURE — 71045 X-RAY EXAM CHEST 1 VIEW: CPT

## 2025-07-30 RX ORDER — HYDROMORPHONE/SOD CHLOR,ISO/PF 2 MG/10 ML
1 SYRINGE (ML) INJECTION ONCE
Refills: 0 | Status: DISCONTINUED | OUTPATIENT
Start: 2025-07-30 | End: 2025-07-30

## 2025-07-30 RX ADMIN — Medication 1 MILLIGRAM(S): at 22:56

## 2025-07-30 RX ADMIN — Medication 1 MILLIGRAM(S): at 20:53

## 2025-07-30 RX ADMIN — Medication 1000 MILLILITER(S): at 22:58

## 2025-07-30 RX ADMIN — Medication 1 MILLIGRAM(S): at 23:26

## 2025-07-30 RX ADMIN — Medication 1 MILLIGRAM(S): at 21:23

## 2025-07-31 ENCOUNTER — NON-APPOINTMENT (OUTPATIENT)
Age: 55
End: 2025-07-31

## 2025-07-31 ENCOUNTER — TRANSCRIPTION ENCOUNTER (OUTPATIENT)
Age: 55
End: 2025-07-31

## 2025-07-31 DIAGNOSIS — I10 ESSENTIAL (PRIMARY) HYPERTENSION: ICD-10-CM

## 2025-07-31 DIAGNOSIS — R52 PAIN, UNSPECIFIED: ICD-10-CM

## 2025-07-31 DIAGNOSIS — N17.9 ACUTE KIDNEY FAILURE, UNSPECIFIED: ICD-10-CM

## 2025-07-31 DIAGNOSIS — R07.81 PLEURODYNIA: ICD-10-CM

## 2025-07-31 DIAGNOSIS — Z29.9 ENCOUNTER FOR PROPHYLACTIC MEASURES, UNSPECIFIED: ICD-10-CM

## 2025-07-31 DIAGNOSIS — G62.9 POLYNEUROPATHY, UNSPECIFIED: ICD-10-CM

## 2025-07-31 DIAGNOSIS — G90.50 COMPLEX REGIONAL PAIN SYNDROME I, UNSPECIFIED: ICD-10-CM

## 2025-07-31 DIAGNOSIS — E11.9 TYPE 2 DIABETES MELLITUS WITHOUT COMPLICATIONS: ICD-10-CM

## 2025-07-31 DIAGNOSIS — G58.8 OTHER SPECIFIED MONONEUROPATHIES: ICD-10-CM

## 2025-07-31 LAB
ALBUMIN SERPL ELPH-MCNC: 3.1 G/DL — LOW (ref 3.5–5)
ALP SERPL-CCNC: 116 U/L — SIGNIFICANT CHANGE UP (ref 40–120)
ALT FLD-CCNC: 23 U/L DA — SIGNIFICANT CHANGE UP (ref 10–60)
ANION GAP SERPL CALC-SCNC: 4 MMOL/L — LOW (ref 5–17)
AST SERPL-CCNC: 22 U/L — SIGNIFICANT CHANGE UP (ref 10–40)
BILIRUB SERPL-MCNC: 0.5 MG/DL — SIGNIFICANT CHANGE UP (ref 0.2–1.2)
BUN SERPL-MCNC: 16 MG/DL — SIGNIFICANT CHANGE UP (ref 7–18)
CALCIUM SERPL-MCNC: 8.6 MG/DL — SIGNIFICANT CHANGE UP (ref 8.4–10.5)
CHLORIDE SERPL-SCNC: 100 MMOL/L — SIGNIFICANT CHANGE UP (ref 96–108)
CO2 SERPL-SCNC: 29 MMOL/L — SIGNIFICANT CHANGE UP (ref 22–31)
CREAT ?TM UR-MCNC: 265 MG/DL — SIGNIFICANT CHANGE UP
CREAT SERPL-MCNC: 1.03 MG/DL — SIGNIFICANT CHANGE UP (ref 0.5–1.3)
EGFR: 64 ML/MIN/1.73M2 — SIGNIFICANT CHANGE UP
EGFR: 64 ML/MIN/1.73M2 — SIGNIFICANT CHANGE UP
GLUCOSE BLDC GLUCOMTR-MCNC: 154 MG/DL — HIGH (ref 70–99)
GLUCOSE BLDC GLUCOMTR-MCNC: 168 MG/DL — HIGH (ref 70–99)
GLUCOSE BLDC GLUCOMTR-MCNC: 179 MG/DL — HIGH (ref 70–99)
GLUCOSE BLDC GLUCOMTR-MCNC: 98 MG/DL — SIGNIFICANT CHANGE UP (ref 70–99)
GLUCOSE SERPL-MCNC: 95 MG/DL — SIGNIFICANT CHANGE UP (ref 70–99)
HCT VFR BLD CALC: 33.9 % — LOW (ref 34.5–45)
HGB BLD-MCNC: 11.2 G/DL — LOW (ref 11.5–15.5)
MAGNESIUM SERPL-MCNC: 1.5 MG/DL — LOW (ref 1.6–2.6)
MCHC RBC-ENTMCNC: 29.5 PG — SIGNIFICANT CHANGE UP (ref 27–34)
MCHC RBC-ENTMCNC: 33 G/DL — SIGNIFICANT CHANGE UP (ref 32–36)
MCV RBC AUTO: 89.2 FL — SIGNIFICANT CHANGE UP (ref 80–100)
NRBC BLD AUTO-RTO: 0 /100 WBCS — SIGNIFICANT CHANGE UP (ref 0–0)
OSMOLALITY UR: 637 MOS/KG — SIGNIFICANT CHANGE UP (ref 50–1200)
PHOSPHATE SERPL-MCNC: 4.4 MG/DL — SIGNIFICANT CHANGE UP (ref 2.5–4.5)
PLATELET # BLD AUTO: 369 K/UL — SIGNIFICANT CHANGE UP (ref 150–400)
POTASSIUM SERPL-MCNC: 4.1 MMOL/L — SIGNIFICANT CHANGE UP (ref 3.5–5.3)
POTASSIUM SERPL-SCNC: 4.1 MMOL/L — SIGNIFICANT CHANGE UP (ref 3.5–5.3)
POTASSIUM UR-SCNC: 62 MMOL/L — SIGNIFICANT CHANGE UP
PROT SERPL-MCNC: 7.5 G/DL — SIGNIFICANT CHANGE UP (ref 6–8.3)
RBC # BLD: 3.8 M/UL — SIGNIFICANT CHANGE UP (ref 3.8–5.2)
RBC # FLD: 13.2 % — SIGNIFICANT CHANGE UP (ref 10.3–14.5)
SODIUM SERPL-SCNC: 133 MMOL/L — LOW (ref 135–145)
SODIUM UR-SCNC: 42 MMOL/L — SIGNIFICANT CHANGE UP
UUN UR-MCNC: 1006 MG/DL — SIGNIFICANT CHANGE UP
WBC # BLD: 9.19 K/UL — SIGNIFICANT CHANGE UP (ref 3.8–10.5)
WBC # FLD AUTO: 9.19 K/UL — SIGNIFICANT CHANGE UP (ref 3.8–10.5)

## 2025-07-31 PROCEDURE — 36415 COLL VENOUS BLD VENIPUNCTURE: CPT

## 2025-07-31 PROCEDURE — 85027 COMPLETE CBC AUTOMATED: CPT

## 2025-07-31 PROCEDURE — 71045 X-RAY EXAM CHEST 1 VIEW: CPT

## 2025-07-31 PROCEDURE — 84540 ASSAY OF URINE/UREA-N: CPT

## 2025-07-31 PROCEDURE — 83935 ASSAY OF URINE OSMOLALITY: CPT

## 2025-07-31 PROCEDURE — 80053 COMPREHEN METABOLIC PANEL: CPT

## 2025-07-31 PROCEDURE — 84133 ASSAY OF URINE POTASSIUM: CPT

## 2025-07-31 PROCEDURE — 99233 SBSQ HOSP IP/OBS HIGH 50: CPT | Mod: GC

## 2025-07-31 PROCEDURE — 82570 ASSAY OF URINE CREATININE: CPT

## 2025-07-31 PROCEDURE — 83735 ASSAY OF MAGNESIUM: CPT

## 2025-07-31 PROCEDURE — 84100 ASSAY OF PHOSPHORUS: CPT

## 2025-07-31 PROCEDURE — 99222 1ST HOSP IP/OBS MODERATE 55: CPT | Mod: FS

## 2025-07-31 PROCEDURE — 84300 ASSAY OF URINE SODIUM: CPT

## 2025-07-31 PROCEDURE — 85025 COMPLETE CBC W/AUTO DIFF WBC: CPT

## 2025-07-31 PROCEDURE — 82962 GLUCOSE BLOOD TEST: CPT

## 2025-07-31 RX ORDER — MONTELUKAST SODIUM 10 MG/1
10 TABLET ORAL AT BEDTIME
Refills: 0 | Status: DISCONTINUED | OUTPATIENT
Start: 2025-07-31 | End: 2025-08-04

## 2025-07-31 RX ORDER — INSULIN LISPRO 100 U/ML
INJECTION, SOLUTION INTRAVENOUS; SUBCUTANEOUS
Refills: 0 | Status: DISCONTINUED | OUTPATIENT
Start: 2025-07-31 | End: 2025-08-04

## 2025-07-31 RX ORDER — BACLOFEN 10 MG/20ML
20 INJECTION INTRATHECAL EVERY 8 HOURS
Refills: 0 | Status: COMPLETED | OUTPATIENT
Start: 2025-07-31 | End: 2025-08-03

## 2025-07-31 RX ORDER — DEXTROSE 50 % IN WATER 50 %
15 SYRINGE (ML) INTRAVENOUS ONCE
Refills: 0 | Status: DISCONTINUED | OUTPATIENT
Start: 2025-07-31 | End: 2025-08-04

## 2025-07-31 RX ORDER — SENNA 187 MG
2 TABLET ORAL AT BEDTIME
Refills: 0 | Status: DISCONTINUED | OUTPATIENT
Start: 2025-07-31 | End: 2025-08-04

## 2025-07-31 RX ORDER — HYDROMORPHONE/SOD CHLOR,ISO/PF 2 MG/10 ML
1 SYRINGE (ML) INJECTION EVERY 4 HOURS
Refills: 0 | Status: DISCONTINUED | OUTPATIENT
Start: 2025-07-31 | End: 2025-08-04

## 2025-07-31 RX ORDER — LIDOCAINE HYDROCHLORIDE 20 MG/ML
1 JELLY TOPICAL EVERY 24 HOURS
Refills: 0 | Status: DISCONTINUED | OUTPATIENT
Start: 2025-07-31 | End: 2025-08-04

## 2025-07-31 RX ORDER — INSULIN LISPRO 100 U/ML
10 INJECTION, SOLUTION INTRAVENOUS; SUBCUTANEOUS
Refills: 0 | Status: DISCONTINUED | OUTPATIENT
Start: 2025-07-31 | End: 2025-08-04

## 2025-07-31 RX ORDER — INSULIN GLARGINE-YFGN 100 [IU]/ML
45 INJECTION, SOLUTION SUBCUTANEOUS AT BEDTIME
Refills: 0 | Status: DISCONTINUED | OUTPATIENT
Start: 2025-07-31 | End: 2025-08-04

## 2025-07-31 RX ORDER — HYDROMORPHONE/SOD CHLOR,ISO/PF 2 MG/10 ML
1 SYRINGE (ML) INJECTION ONCE
Refills: 0 | Status: DISCONTINUED | OUTPATIENT
Start: 2025-07-31 | End: 2025-07-31

## 2025-07-31 RX ORDER — SODIUM CHLORIDE 9 G/1000ML
1000 INJECTION, SOLUTION INTRAVENOUS
Refills: 0 | Status: DISCONTINUED | OUTPATIENT
Start: 2025-07-31 | End: 2025-08-04

## 2025-07-31 RX ORDER — ATORVASTATIN CALCIUM 80 MG/1
20 TABLET, FILM COATED ORAL AT BEDTIME
Refills: 0 | Status: DISCONTINUED | OUTPATIENT
Start: 2025-07-31 | End: 2025-08-04

## 2025-07-31 RX ORDER — DEXTROSE 50 % IN WATER 50 %
25 SYRINGE (ML) INTRAVENOUS ONCE
Refills: 0 | Status: DISCONTINUED | OUTPATIENT
Start: 2025-07-31 | End: 2025-08-04

## 2025-07-31 RX ORDER — OXYCODONE HYDROCHLORIDE 30 MG/1
25 TABLET ORAL EVERY 6 HOURS
Refills: 0 | Status: DISCONTINUED | OUTPATIENT
Start: 2025-07-31 | End: 2025-08-04

## 2025-07-31 RX ORDER — BACLOFEN 10 MG/20ML
20 INJECTION INTRATHECAL EVERY 8 HOURS
Refills: 0 | Status: DISCONTINUED | OUTPATIENT
Start: 2025-07-31 | End: 2025-07-31

## 2025-07-31 RX ORDER — POLYETHYLENE GLYCOL 3350 17 G/17G
17 POWDER, FOR SOLUTION ORAL DAILY
Refills: 0 | Status: DISCONTINUED | OUTPATIENT
Start: 2025-07-31 | End: 2025-08-04

## 2025-07-31 RX ORDER — ALPRAZOLAM 0.5 MG
0.5 TABLET, EXTENDED RELEASE 24 HR ORAL THREE TIMES A DAY
Refills: 0 | Status: DISCONTINUED | OUTPATIENT
Start: 2025-07-31 | End: 2025-08-04

## 2025-07-31 RX ORDER — ONDANSETRON HCL/PF 4 MG/2 ML
4 VIAL (ML) INJECTION DAILY
Refills: 0 | Status: DISCONTINUED | OUTPATIENT
Start: 2025-07-31 | End: 2025-08-04

## 2025-07-31 RX ORDER — DEXTROSE 50 % IN WATER 50 %
12.5 SYRINGE (ML) INTRAVENOUS ONCE
Refills: 0 | Status: DISCONTINUED | OUTPATIENT
Start: 2025-07-31 | End: 2025-08-04

## 2025-07-31 RX ORDER — GLUCAGON 3 MG/1
1 POWDER NASAL ONCE
Refills: 0 | Status: DISCONTINUED | OUTPATIENT
Start: 2025-07-31 | End: 2025-08-04

## 2025-07-31 RX ORDER — ENOXAPARIN SODIUM 100 MG/ML
40 INJECTION SUBCUTANEOUS EVERY 24 HOURS
Refills: 0 | Status: DISCONTINUED | OUTPATIENT
Start: 2025-07-31 | End: 2025-08-04

## 2025-07-31 RX ORDER — ACETAMINOPHEN 500 MG/5ML
1000 LIQUID (ML) ORAL EVERY 8 HOURS
Refills: 0 | Status: DISCONTINUED | OUTPATIENT
Start: 2025-07-31 | End: 2025-08-04

## 2025-07-31 RX ORDER — MAGNESIUM SULFATE 500 MG/ML
1 SYRINGE (ML) INJECTION ONCE
Refills: 0 | Status: COMPLETED | OUTPATIENT
Start: 2025-07-31 | End: 2025-07-31

## 2025-07-31 RX ORDER — PREGABALIN 50 MG/1
100 CAPSULE ORAL EVERY 8 HOURS
Refills: 0 | Status: DISCONTINUED | OUTPATIENT
Start: 2025-07-31 | End: 2025-08-04

## 2025-07-31 RX ADMIN — Medication 1 MILLIGRAM(S): at 16:50

## 2025-07-31 RX ADMIN — Medication 0.5 MILLIGRAM(S): at 21:55

## 2025-07-31 RX ADMIN — ENOXAPARIN SODIUM 40 MILLIGRAM(S): 100 INJECTION SUBCUTANEOUS at 06:55

## 2025-07-31 RX ADMIN — INSULIN LISPRO 2: 100 INJECTION, SOLUTION INTRAVENOUS; SUBCUTANEOUS at 16:53

## 2025-07-31 RX ADMIN — Medication 1 MILLIGRAM(S): at 09:41

## 2025-07-31 RX ADMIN — MONTELUKAST SODIUM 10 MILLIGRAM(S): 10 TABLET ORAL at 21:48

## 2025-07-31 RX ADMIN — Medication 1000 MILLIGRAM(S): at 21:48

## 2025-07-31 RX ADMIN — Medication 1000 MILLIGRAM(S): at 07:29

## 2025-07-31 RX ADMIN — Medication 1 MILLIGRAM(S): at 06:52

## 2025-07-31 RX ADMIN — Medication 1 MILLIGRAM(S): at 16:00

## 2025-07-31 RX ADMIN — PREGABALIN 100 MILLIGRAM(S): 50 CAPSULE ORAL at 21:47

## 2025-07-31 RX ADMIN — Medication 2 TABLET(S): at 21:47

## 2025-07-31 RX ADMIN — Medication 1 MILLIGRAM(S): at 01:31

## 2025-07-31 RX ADMIN — PREGABALIN 100 MILLIGRAM(S): 50 CAPSULE ORAL at 06:55

## 2025-07-31 RX ADMIN — POLYETHYLENE GLYCOL 3350 17 GRAM(S): 17 POWDER, FOR SOLUTION ORAL at 12:12

## 2025-07-31 RX ADMIN — LIDOCAINE HYDROCHLORIDE 1 PATCH: 20 JELLY TOPICAL at 21:59

## 2025-07-31 RX ADMIN — Medication 1 MILLIGRAM(S): at 01:02

## 2025-07-31 RX ADMIN — Medication 1 MILLIGRAM(S): at 12:21

## 2025-07-31 RX ADMIN — Medication 1 MILLIGRAM(S): at 13:00

## 2025-07-31 RX ADMIN — Medication 1 MILLIGRAM(S): at 21:59

## 2025-07-31 RX ADMIN — BACLOFEN 20 MILLIGRAM(S): 10 INJECTION INTRATHECAL at 21:48

## 2025-07-31 RX ADMIN — Medication 1 MILLIGRAM(S): at 05:29

## 2025-07-31 RX ADMIN — OXYCODONE HYDROCHLORIDE 25 MILLIGRAM(S): 30 TABLET ORAL at 13:21

## 2025-07-31 RX ADMIN — ATORVASTATIN CALCIUM 20 MILLIGRAM(S): 80 TABLET, FILM COATED ORAL at 21:48

## 2025-07-31 RX ADMIN — Medication 1000 MILLIGRAM(S): at 14:49

## 2025-07-31 RX ADMIN — INSULIN LISPRO 10 UNIT(S): 100 INJECTION, SOLUTION INTRAVENOUS; SUBCUTANEOUS at 08:36

## 2025-07-31 RX ADMIN — PREGABALIN 100 MILLIGRAM(S): 50 CAPSULE ORAL at 13:26

## 2025-07-31 RX ADMIN — Medication 1000 MILLIGRAM(S): at 21:58

## 2025-07-31 RX ADMIN — INSULIN LISPRO 10 UNIT(S): 100 INJECTION, SOLUTION INTRAVENOUS; SUBCUTANEOUS at 16:53

## 2025-07-31 RX ADMIN — Medication 1 MILLIGRAM(S): at 20:36

## 2025-07-31 RX ADMIN — OXYCODONE HYDROCHLORIDE 25 MILLIGRAM(S): 30 TABLET ORAL at 12:14

## 2025-07-31 RX ADMIN — INSULIN GLARGINE-YFGN 45 UNIT(S): 100 INJECTION, SOLUTION SUBCUTANEOUS at 21:48

## 2025-07-31 RX ADMIN — OXYCODONE HYDROCHLORIDE 25 MILLIGRAM(S): 30 TABLET ORAL at 19:21

## 2025-07-31 RX ADMIN — Medication 1000 MILLIGRAM(S): at 06:55

## 2025-07-31 RX ADMIN — Medication 1 MILLIGRAM(S): at 10:30

## 2025-07-31 RX ADMIN — LIDOCAINE HYDROCHLORIDE 1 PATCH: 20 JELLY TOPICAL at 09:43

## 2025-07-31 RX ADMIN — OXYCODONE HYDROCHLORIDE 25 MILLIGRAM(S): 30 TABLET ORAL at 18:24

## 2025-07-31 RX ADMIN — Medication 1000 MILLIGRAM(S): at 13:25

## 2025-07-31 RX ADMIN — Medication 4 MILLIGRAM(S): at 13:26

## 2025-07-31 RX ADMIN — INSULIN LISPRO 2: 100 INJECTION, SOLUTION INTRAVENOUS; SUBCUTANEOUS at 08:35

## 2025-07-31 RX ADMIN — BACLOFEN 20 MILLIGRAM(S): 10 INJECTION INTRATHECAL at 16:18

## 2025-07-31 RX ADMIN — Medication 100 GRAM(S): at 16:18

## 2025-08-01 DIAGNOSIS — I10 ESSENTIAL (PRIMARY) HYPERTENSION: ICD-10-CM

## 2025-08-01 DIAGNOSIS — G90.50 COMPLEX REGIONAL PAIN SYNDROME I, UNSPECIFIED: ICD-10-CM

## 2025-08-01 DIAGNOSIS — F11.20 OPIOID DEPENDENCE, UNCOMPLICATED: ICD-10-CM

## 2025-08-01 DIAGNOSIS — R07.81 PLEURODYNIA: ICD-10-CM

## 2025-08-01 DIAGNOSIS — K59.09 OTHER CONSTIPATION: ICD-10-CM

## 2025-08-01 DIAGNOSIS — E66.01 MORBID (SEVERE) OBESITY DUE TO EXCESS CALORIES: ICD-10-CM

## 2025-08-01 LAB
GLUCOSE BLDC GLUCOMTR-MCNC: 131 MG/DL — HIGH (ref 70–99)
GLUCOSE BLDC GLUCOMTR-MCNC: 176 MG/DL — HIGH (ref 70–99)
GLUCOSE BLDC GLUCOMTR-MCNC: 178 MG/DL — HIGH (ref 70–99)
GLUCOSE BLDC GLUCOMTR-MCNC: 192 MG/DL — HIGH (ref 70–99)

## 2025-08-01 PROCEDURE — 99232 SBSQ HOSP IP/OBS MODERATE 35: CPT | Mod: GC

## 2025-08-01 PROCEDURE — 99232 SBSQ HOSP IP/OBS MODERATE 35: CPT | Mod: FS

## 2025-08-01 RX ORDER — LISINOPRIL 5 MG/1
2 TABLET ORAL
Refills: 0 | DISCHARGE

## 2025-08-01 RX ORDER — METOCLOPRAMIDE HCL 10 MG
10 TABLET ORAL ONCE
Refills: 0 | Status: COMPLETED | OUTPATIENT
Start: 2025-08-01 | End: 2025-08-01

## 2025-08-01 RX ADMIN — INSULIN LISPRO 10 UNIT(S): 100 INJECTION, SOLUTION INTRAVENOUS; SUBCUTANEOUS at 17:26

## 2025-08-01 RX ADMIN — Medication 4 MILLIGRAM(S): at 09:15

## 2025-08-01 RX ADMIN — INSULIN LISPRO 2: 100 INJECTION, SOLUTION INTRAVENOUS; SUBCUTANEOUS at 08:21

## 2025-08-01 RX ADMIN — OXYCODONE HYDROCHLORIDE 25 MILLIGRAM(S): 30 TABLET ORAL at 12:58

## 2025-08-01 RX ADMIN — PREGABALIN 100 MILLIGRAM(S): 50 CAPSULE ORAL at 06:28

## 2025-08-01 RX ADMIN — Medication 1 MILLIGRAM(S): at 21:36

## 2025-08-01 RX ADMIN — Medication 1000 MILLIGRAM(S): at 21:13

## 2025-08-01 RX ADMIN — BACLOFEN 20 MILLIGRAM(S): 10 INJECTION INTRATHECAL at 06:28

## 2025-08-01 RX ADMIN — Medication 1 MILLIGRAM(S): at 18:25

## 2025-08-01 RX ADMIN — INSULIN LISPRO 10 UNIT(S): 100 INJECTION, SOLUTION INTRAVENOUS; SUBCUTANEOUS at 08:20

## 2025-08-01 RX ADMIN — ATORVASTATIN CALCIUM 20 MILLIGRAM(S): 80 TABLET, FILM COATED ORAL at 21:13

## 2025-08-01 RX ADMIN — Medication 1 MILLIGRAM(S): at 09:55

## 2025-08-01 RX ADMIN — Medication 1 MILLIGRAM(S): at 21:47

## 2025-08-01 RX ADMIN — MONTELUKAST SODIUM 10 MILLIGRAM(S): 10 TABLET ORAL at 21:13

## 2025-08-01 RX ADMIN — Medication 1000 MILLIGRAM(S): at 06:28

## 2025-08-01 RX ADMIN — OXYCODONE HYDROCHLORIDE 25 MILLIGRAM(S): 30 TABLET ORAL at 11:40

## 2025-08-01 RX ADMIN — Medication 1000 MILLIGRAM(S): at 06:33

## 2025-08-01 RX ADMIN — INSULIN LISPRO 10 UNIT(S): 100 INJECTION, SOLUTION INTRAVENOUS; SUBCUTANEOUS at 11:42

## 2025-08-01 RX ADMIN — Medication 1 MILLIGRAM(S): at 08:51

## 2025-08-01 RX ADMIN — POLYETHYLENE GLYCOL 3350 17 GRAM(S): 17 POWDER, FOR SOLUTION ORAL at 11:39

## 2025-08-01 RX ADMIN — Medication 10 MILLIGRAM(S): at 21:12

## 2025-08-01 RX ADMIN — INSULIN GLARGINE-YFGN 45 UNIT(S): 100 INJECTION, SOLUTION SUBCUTANEOUS at 22:40

## 2025-08-01 RX ADMIN — OXYCODONE HYDROCHLORIDE 25 MILLIGRAM(S): 30 TABLET ORAL at 23:35

## 2025-08-01 RX ADMIN — PREGABALIN 100 MILLIGRAM(S): 50 CAPSULE ORAL at 13:38

## 2025-08-01 RX ADMIN — Medication 1 MILLIGRAM(S): at 17:21

## 2025-08-01 RX ADMIN — LIDOCAINE HYDROCHLORIDE 1 PATCH: 20 JELLY TOPICAL at 08:51

## 2025-08-01 RX ADMIN — LIDOCAINE HYDROCHLORIDE 1 PATCH: 20 JELLY TOPICAL at 21:05

## 2025-08-01 RX ADMIN — LIDOCAINE HYDROCHLORIDE 1 PATCH: 20 JELLY TOPICAL at 19:32

## 2025-08-01 RX ADMIN — Medication 2 TABLET(S): at 21:12

## 2025-08-01 RX ADMIN — Medication 1000 MILLIGRAM(S): at 21:47

## 2025-08-01 RX ADMIN — INSULIN LISPRO 2: 100 INJECTION, SOLUTION INTRAVENOUS; SUBCUTANEOUS at 11:42

## 2025-08-01 RX ADMIN — Medication 1000 MILLIGRAM(S): at 13:37

## 2025-08-01 RX ADMIN — Medication 1000 MILLIGRAM(S): at 14:40

## 2025-08-01 RX ADMIN — ENOXAPARIN SODIUM 40 MILLIGRAM(S): 100 INJECTION SUBCUTANEOUS at 06:28

## 2025-08-02 LAB
ALBUMIN SERPL ELPH-MCNC: 3.1 G/DL — LOW (ref 3.5–5)
ALP SERPL-CCNC: 127 U/L — HIGH (ref 40–120)
ALT FLD-CCNC: 33 U/L DA — SIGNIFICANT CHANGE UP (ref 10–60)
ANION GAP SERPL CALC-SCNC: 4 MMOL/L — LOW (ref 5–17)
AST SERPL-CCNC: 23 U/L — SIGNIFICANT CHANGE UP (ref 10–40)
BASOPHILS # BLD AUTO: 0.03 K/UL — SIGNIFICANT CHANGE UP (ref 0–0.2)
BASOPHILS NFR BLD AUTO: 0.3 % — SIGNIFICANT CHANGE UP (ref 0–2)
BILIRUB SERPL-MCNC: 0.4 MG/DL — SIGNIFICANT CHANGE UP (ref 0.2–1.2)
BUN SERPL-MCNC: 17 MG/DL — SIGNIFICANT CHANGE UP (ref 7–18)
CALCIUM SERPL-MCNC: 8.8 MG/DL — SIGNIFICANT CHANGE UP (ref 8.4–10.5)
CHLORIDE SERPL-SCNC: 103 MMOL/L — SIGNIFICANT CHANGE UP (ref 96–108)
CO2 SERPL-SCNC: 28 MMOL/L — SIGNIFICANT CHANGE UP (ref 22–31)
CREAT SERPL-MCNC: 1.04 MG/DL — SIGNIFICANT CHANGE UP (ref 0.5–1.3)
EGFR: 63 ML/MIN/1.73M2 — SIGNIFICANT CHANGE UP
EGFR: 63 ML/MIN/1.73M2 — SIGNIFICANT CHANGE UP
EOSINOPHIL # BLD AUTO: 0.18 K/UL — SIGNIFICANT CHANGE UP (ref 0–0.5)
EOSINOPHIL NFR BLD AUTO: 2 % — SIGNIFICANT CHANGE UP (ref 0–6)
GLUCOSE BLDC GLUCOMTR-MCNC: 152 MG/DL — HIGH (ref 70–99)
GLUCOSE BLDC GLUCOMTR-MCNC: 162 MG/DL — HIGH (ref 70–99)
GLUCOSE BLDC GLUCOMTR-MCNC: 163 MG/DL — HIGH (ref 70–99)
GLUCOSE BLDC GLUCOMTR-MCNC: 219 MG/DL — HIGH (ref 70–99)
GLUCOSE SERPL-MCNC: 162 MG/DL — HIGH (ref 70–99)
HCT VFR BLD CALC: 35.3 % — SIGNIFICANT CHANGE UP (ref 34.5–45)
HGB BLD-MCNC: 11.5 G/DL — SIGNIFICANT CHANGE UP (ref 11.5–15.5)
IMM GRANULOCYTES NFR BLD AUTO: 0.2 % — SIGNIFICANT CHANGE UP (ref 0–0.9)
LYMPHOCYTES # BLD AUTO: 3.66 K/UL — HIGH (ref 1–3.3)
LYMPHOCYTES # BLD AUTO: 40.6 % — SIGNIFICANT CHANGE UP (ref 13–44)
MAGNESIUM SERPL-MCNC: 1.6 MG/DL — SIGNIFICANT CHANGE UP (ref 1.6–2.6)
MCHC RBC-ENTMCNC: 29.5 PG — SIGNIFICANT CHANGE UP (ref 27–34)
MCHC RBC-ENTMCNC: 32.6 G/DL — SIGNIFICANT CHANGE UP (ref 32–36)
MCV RBC AUTO: 90.5 FL — SIGNIFICANT CHANGE UP (ref 80–100)
MONOCYTES # BLD AUTO: 0.5 K/UL — SIGNIFICANT CHANGE UP (ref 0–0.9)
MONOCYTES NFR BLD AUTO: 5.5 % — SIGNIFICANT CHANGE UP (ref 2–14)
NEUTROPHILS # BLD AUTO: 4.63 K/UL — SIGNIFICANT CHANGE UP (ref 1.8–7.4)
NEUTROPHILS NFR BLD AUTO: 51.4 % — SIGNIFICANT CHANGE UP (ref 43–77)
NRBC BLD AUTO-RTO: 0 /100 WBCS — SIGNIFICANT CHANGE UP (ref 0–0)
PHOSPHATE SERPL-MCNC: 3.4 MG/DL — SIGNIFICANT CHANGE UP (ref 2.5–4.5)
PLATELET # BLD AUTO: 382 K/UL — SIGNIFICANT CHANGE UP (ref 150–400)
POTASSIUM SERPL-MCNC: 4.1 MMOL/L — SIGNIFICANT CHANGE UP (ref 3.5–5.3)
POTASSIUM SERPL-SCNC: 4.1 MMOL/L — SIGNIFICANT CHANGE UP (ref 3.5–5.3)
PROT SERPL-MCNC: 7.5 G/DL — SIGNIFICANT CHANGE UP (ref 6–8.3)
RBC # BLD: 3.9 M/UL — SIGNIFICANT CHANGE UP (ref 3.8–5.2)
RBC # FLD: 13 % — SIGNIFICANT CHANGE UP (ref 10.3–14.5)
SODIUM SERPL-SCNC: 135 MMOL/L — SIGNIFICANT CHANGE UP (ref 135–145)
WBC # BLD: 9.02 K/UL — SIGNIFICANT CHANGE UP (ref 3.8–10.5)
WBC # FLD AUTO: 9.02 K/UL — SIGNIFICANT CHANGE UP (ref 3.8–10.5)

## 2025-08-02 PROCEDURE — 99232 SBSQ HOSP IP/OBS MODERATE 35: CPT

## 2025-08-02 RX ADMIN — Medication 1 MILLIGRAM(S): at 22:20

## 2025-08-02 RX ADMIN — INSULIN GLARGINE-YFGN 45 UNIT(S): 100 INJECTION, SOLUTION SUBCUTANEOUS at 21:40

## 2025-08-02 RX ADMIN — INSULIN LISPRO 2: 100 INJECTION, SOLUTION INTRAVENOUS; SUBCUTANEOUS at 08:06

## 2025-08-02 RX ADMIN — PREGABALIN 100 MILLIGRAM(S): 50 CAPSULE ORAL at 14:20

## 2025-08-02 RX ADMIN — Medication 1000 MILLIGRAM(S): at 21:39

## 2025-08-02 RX ADMIN — Medication 1000 MILLIGRAM(S): at 05:41

## 2025-08-02 RX ADMIN — ENOXAPARIN SODIUM 40 MILLIGRAM(S): 100 INJECTION SUBCUTANEOUS at 06:03

## 2025-08-02 RX ADMIN — Medication 1 MILLIGRAM(S): at 10:05

## 2025-08-02 RX ADMIN — Medication 1 MILLIGRAM(S): at 19:52

## 2025-08-02 RX ADMIN — Medication 1 MILLIGRAM(S): at 09:04

## 2025-08-02 RX ADMIN — MONTELUKAST SODIUM 10 MILLIGRAM(S): 10 TABLET ORAL at 21:40

## 2025-08-02 RX ADMIN — Medication 1 MILLIGRAM(S): at 05:03

## 2025-08-02 RX ADMIN — POLYETHYLENE GLYCOL 3350 17 GRAM(S): 17 POWDER, FOR SOLUTION ORAL at 12:17

## 2025-08-02 RX ADMIN — Medication 1 MILLIGRAM(S): at 23:03

## 2025-08-02 RX ADMIN — OXYCODONE HYDROCHLORIDE 25 MILLIGRAM(S): 30 TABLET ORAL at 07:00

## 2025-08-02 RX ADMIN — Medication 1 MILLIGRAM(S): at 14:22

## 2025-08-02 RX ADMIN — Medication 4 MILLIGRAM(S): at 10:35

## 2025-08-02 RX ADMIN — Medication 1000 MILLIGRAM(S): at 15:20

## 2025-08-02 RX ADMIN — OXYCODONE HYDROCHLORIDE 25 MILLIGRAM(S): 30 TABLET ORAL at 06:06

## 2025-08-02 RX ADMIN — OXYCODONE HYDROCHLORIDE 25 MILLIGRAM(S): 30 TABLET ORAL at 00:09

## 2025-08-02 RX ADMIN — ATORVASTATIN CALCIUM 20 MILLIGRAM(S): 80 TABLET, FILM COATED ORAL at 21:39

## 2025-08-02 RX ADMIN — BACLOFEN 20 MILLIGRAM(S): 10 INJECTION INTRATHECAL at 05:03

## 2025-08-02 RX ADMIN — LIDOCAINE HYDROCHLORIDE 1 PATCH: 20 JELLY TOPICAL at 19:03

## 2025-08-02 RX ADMIN — INSULIN LISPRO 10 UNIT(S): 100 INJECTION, SOLUTION INTRAVENOUS; SUBCUTANEOUS at 17:46

## 2025-08-02 RX ADMIN — Medication 1000 MILLIGRAM(S): at 05:03

## 2025-08-02 RX ADMIN — INSULIN LISPRO 10 UNIT(S): 100 INJECTION, SOLUTION INTRAVENOUS; SUBCUTANEOUS at 12:16

## 2025-08-02 RX ADMIN — LIDOCAINE HYDROCHLORIDE 1 PATCH: 20 JELLY TOPICAL at 09:04

## 2025-08-02 RX ADMIN — INSULIN LISPRO 10 UNIT(S): 100 INJECTION, SOLUTION INTRAVENOUS; SUBCUTANEOUS at 08:05

## 2025-08-02 RX ADMIN — Medication 1 MILLIGRAM(S): at 18:45

## 2025-08-02 RX ADMIN — OXYCODONE HYDROCHLORIDE 25 MILLIGRAM(S): 30 TABLET ORAL at 12:19

## 2025-08-02 RX ADMIN — Medication 1 MILLIGRAM(S): at 15:25

## 2025-08-02 RX ADMIN — Medication 2 TABLET(S): at 21:40

## 2025-08-02 RX ADMIN — INSULIN LISPRO 2: 100 INJECTION, SOLUTION INTRAVENOUS; SUBCUTANEOUS at 12:16

## 2025-08-02 RX ADMIN — BACLOFEN 20 MILLIGRAM(S): 10 INJECTION INTRATHECAL at 14:20

## 2025-08-02 RX ADMIN — LIDOCAINE HYDROCHLORIDE 1 PATCH: 20 JELLY TOPICAL at 21:41

## 2025-08-02 RX ADMIN — Medication 1000 MILLIGRAM(S): at 23:02

## 2025-08-02 RX ADMIN — INSULIN LISPRO 2: 100 INJECTION, SOLUTION INTRAVENOUS; SUBCUTANEOUS at 17:46

## 2025-08-02 RX ADMIN — Medication 1 MILLIGRAM(S): at 04:10

## 2025-08-02 RX ADMIN — BACLOFEN 20 MILLIGRAM(S): 10 INJECTION INTRATHECAL at 21:39

## 2025-08-02 RX ADMIN — Medication 1000 MILLIGRAM(S): at 14:20

## 2025-08-02 RX ADMIN — OXYCODONE HYDROCHLORIDE 25 MILLIGRAM(S): 30 TABLET ORAL at 13:20

## 2025-08-03 LAB
ALBUMIN SERPL ELPH-MCNC: 2.9 G/DL — LOW (ref 3.5–5)
ALP SERPL-CCNC: 125 U/L — HIGH (ref 40–120)
ALT FLD-CCNC: 42 U/L DA — SIGNIFICANT CHANGE UP (ref 10–60)
ANION GAP SERPL CALC-SCNC: 2 MMOL/L — LOW (ref 5–17)
AST SERPL-CCNC: 35 U/L — SIGNIFICANT CHANGE UP (ref 10–40)
BASOPHILS # BLD AUTO: 0.03 K/UL — SIGNIFICANT CHANGE UP (ref 0–0.2)
BASOPHILS NFR BLD AUTO: 0.4 % — SIGNIFICANT CHANGE UP (ref 0–2)
BILIRUB SERPL-MCNC: 0.4 MG/DL — SIGNIFICANT CHANGE UP (ref 0.2–1.2)
BUN SERPL-MCNC: 14 MG/DL — SIGNIFICANT CHANGE UP (ref 7–18)
CALCIUM SERPL-MCNC: 8.8 MG/DL — SIGNIFICANT CHANGE UP (ref 8.4–10.5)
CHLORIDE SERPL-SCNC: 104 MMOL/L — SIGNIFICANT CHANGE UP (ref 96–108)
CO2 SERPL-SCNC: 27 MMOL/L — SIGNIFICANT CHANGE UP (ref 22–31)
CREAT SERPL-MCNC: 0.96 MG/DL — SIGNIFICANT CHANGE UP (ref 0.5–1.3)
EGFR: 70 ML/MIN/1.73M2 — SIGNIFICANT CHANGE UP
EGFR: 70 ML/MIN/1.73M2 — SIGNIFICANT CHANGE UP
EOSINOPHIL # BLD AUTO: 0.16 K/UL — SIGNIFICANT CHANGE UP (ref 0–0.5)
EOSINOPHIL NFR BLD AUTO: 1.9 % — SIGNIFICANT CHANGE UP (ref 0–6)
GLUCOSE BLDC GLUCOMTR-MCNC: 122 MG/DL — HIGH (ref 70–99)
GLUCOSE BLDC GLUCOMTR-MCNC: 128 MG/DL — HIGH (ref 70–99)
GLUCOSE BLDC GLUCOMTR-MCNC: 163 MG/DL — HIGH (ref 70–99)
GLUCOSE BLDC GLUCOMTR-MCNC: 198 MG/DL — HIGH (ref 70–99)
GLUCOSE SERPL-MCNC: 181 MG/DL — HIGH (ref 70–99)
HCT VFR BLD CALC: 34.6 % — SIGNIFICANT CHANGE UP (ref 34.5–45)
HGB BLD-MCNC: 11.1 G/DL — LOW (ref 11.5–15.5)
IMM GRANULOCYTES NFR BLD AUTO: 0.2 % — SIGNIFICANT CHANGE UP (ref 0–0.9)
LYMPHOCYTES # BLD AUTO: 3.3 K/UL — SIGNIFICANT CHANGE UP (ref 1–3.3)
LYMPHOCYTES # BLD AUTO: 40.1 % — SIGNIFICANT CHANGE UP (ref 13–44)
MAGNESIUM SERPL-MCNC: 1.6 MG/DL — SIGNIFICANT CHANGE UP (ref 1.6–2.6)
MCHC RBC-ENTMCNC: 29.6 PG — SIGNIFICANT CHANGE UP (ref 27–34)
MCHC RBC-ENTMCNC: 32.1 G/DL — SIGNIFICANT CHANGE UP (ref 32–36)
MCV RBC AUTO: 92.3 FL — SIGNIFICANT CHANGE UP (ref 80–100)
MONOCYTES # BLD AUTO: 0.4 K/UL — SIGNIFICANT CHANGE UP (ref 0–0.9)
MONOCYTES NFR BLD AUTO: 4.9 % — SIGNIFICANT CHANGE UP (ref 2–14)
NEUTROPHILS # BLD AUTO: 4.32 K/UL — SIGNIFICANT CHANGE UP (ref 1.8–7.4)
NEUTROPHILS NFR BLD AUTO: 52.5 % — SIGNIFICANT CHANGE UP (ref 43–77)
NRBC BLD AUTO-RTO: 0 /100 WBCS — SIGNIFICANT CHANGE UP (ref 0–0)
PHOSPHATE SERPL-MCNC: 4 MG/DL — SIGNIFICANT CHANGE UP (ref 2.5–4.5)
PLATELET # BLD AUTO: 357 K/UL — SIGNIFICANT CHANGE UP (ref 150–400)
POTASSIUM SERPL-MCNC: 4.4 MMOL/L — SIGNIFICANT CHANGE UP (ref 3.5–5.3)
POTASSIUM SERPL-SCNC: 4.4 MMOL/L — SIGNIFICANT CHANGE UP (ref 3.5–5.3)
PROT SERPL-MCNC: 6.9 G/DL — SIGNIFICANT CHANGE UP (ref 6–8.3)
RBC # BLD: 3.75 M/UL — LOW (ref 3.8–5.2)
RBC # FLD: 12.9 % — SIGNIFICANT CHANGE UP (ref 10.3–14.5)
SODIUM SERPL-SCNC: 133 MMOL/L — LOW (ref 135–145)
WBC # BLD: 8.23 K/UL — SIGNIFICANT CHANGE UP (ref 3.8–10.5)
WBC # FLD AUTO: 8.23 K/UL — SIGNIFICANT CHANGE UP (ref 3.8–10.5)

## 2025-08-03 PROCEDURE — 99232 SBSQ HOSP IP/OBS MODERATE 35: CPT | Mod: GC

## 2025-08-03 RX ADMIN — BACLOFEN 20 MILLIGRAM(S): 10 INJECTION INTRATHECAL at 14:05

## 2025-08-03 RX ADMIN — Medication 1 MILLIGRAM(S): at 21:20

## 2025-08-03 RX ADMIN — INSULIN LISPRO 2: 100 INJECTION, SOLUTION INTRAVENOUS; SUBCUTANEOUS at 08:29

## 2025-08-03 RX ADMIN — LIDOCAINE HYDROCHLORIDE 1 PATCH: 20 JELLY TOPICAL at 21:54

## 2025-08-03 RX ADMIN — LIDOCAINE HYDROCHLORIDE 1 PATCH: 20 JELLY TOPICAL at 19:57

## 2025-08-03 RX ADMIN — Medication 4 MILLIGRAM(S): at 08:06

## 2025-08-03 RX ADMIN — OXYCODONE HYDROCHLORIDE 25 MILLIGRAM(S): 30 TABLET ORAL at 04:22

## 2025-08-03 RX ADMIN — OXYCODONE HYDROCHLORIDE 25 MILLIGRAM(S): 30 TABLET ORAL at 19:33

## 2025-08-03 RX ADMIN — ENOXAPARIN SODIUM 40 MILLIGRAM(S): 100 INJECTION SUBCUTANEOUS at 06:02

## 2025-08-03 RX ADMIN — INSULIN LISPRO 10 UNIT(S): 100 INJECTION, SOLUTION INTRAVENOUS; SUBCUTANEOUS at 08:28

## 2025-08-03 RX ADMIN — INSULIN GLARGINE-YFGN 45 UNIT(S): 100 INJECTION, SOLUTION SUBCUTANEOUS at 21:46

## 2025-08-03 RX ADMIN — Medication 1000 MILLIGRAM(S): at 22:46

## 2025-08-03 RX ADMIN — Medication 0.5 MILLIGRAM(S): at 21:49

## 2025-08-03 RX ADMIN — Medication 1000 MILLIGRAM(S): at 14:05

## 2025-08-03 RX ADMIN — OXYCODONE HYDROCHLORIDE 25 MILLIGRAM(S): 30 TABLET ORAL at 12:27

## 2025-08-03 RX ADMIN — Medication 1000 MILLIGRAM(S): at 06:01

## 2025-08-03 RX ADMIN — ATORVASTATIN CALCIUM 20 MILLIGRAM(S): 80 TABLET, FILM COATED ORAL at 21:47

## 2025-08-03 RX ADMIN — Medication 1 MILLIGRAM(S): at 20:20

## 2025-08-03 RX ADMIN — Medication 1000 MILLIGRAM(S): at 21:46

## 2025-08-03 RX ADMIN — INSULIN LISPRO 10 UNIT(S): 100 INJECTION, SOLUTION INTRAVENOUS; SUBCUTANEOUS at 12:55

## 2025-08-03 RX ADMIN — PREGABALIN 100 MILLIGRAM(S): 50 CAPSULE ORAL at 14:05

## 2025-08-03 RX ADMIN — OXYCODONE HYDROCHLORIDE 25 MILLIGRAM(S): 30 TABLET ORAL at 06:02

## 2025-08-03 RX ADMIN — PREGABALIN 100 MILLIGRAM(S): 50 CAPSULE ORAL at 21:46

## 2025-08-03 RX ADMIN — OXYCODONE HYDROCHLORIDE 25 MILLIGRAM(S): 30 TABLET ORAL at 18:33

## 2025-08-03 RX ADMIN — MONTELUKAST SODIUM 10 MILLIGRAM(S): 10 TABLET ORAL at 21:46

## 2025-08-03 RX ADMIN — Medication 1 MILLIGRAM(S): at 11:01

## 2025-08-03 RX ADMIN — Medication 1000 MILLIGRAM(S): at 05:45

## 2025-08-03 RX ADMIN — Medication 1 MILLIGRAM(S): at 07:02

## 2025-08-03 RX ADMIN — Medication 1 MILLIGRAM(S): at 05:45

## 2025-08-03 RX ADMIN — OXYCODONE HYDROCHLORIDE 25 MILLIGRAM(S): 30 TABLET ORAL at 11:27

## 2025-08-03 RX ADMIN — Medication 1 MILLIGRAM(S): at 16:39

## 2025-08-03 RX ADMIN — LIDOCAINE HYDROCHLORIDE 1 PATCH: 20 JELLY TOPICAL at 09:09

## 2025-08-03 RX ADMIN — Medication 1 MILLIGRAM(S): at 15:39

## 2025-08-03 RX ADMIN — POLYETHYLENE GLYCOL 3350 17 GRAM(S): 17 POWDER, FOR SOLUTION ORAL at 12:55

## 2025-08-03 RX ADMIN — Medication 1 MILLIGRAM(S): at 10:01

## 2025-08-03 RX ADMIN — Medication 2 TABLET(S): at 21:47

## 2025-08-03 RX ADMIN — Medication 1000 MILLIGRAM(S): at 15:05

## 2025-08-03 RX ADMIN — INSULIN LISPRO 10 UNIT(S): 100 INJECTION, SOLUTION INTRAVENOUS; SUBCUTANEOUS at 17:42

## 2025-08-04 ENCOUNTER — TRANSCRIPTION ENCOUNTER (OUTPATIENT)
Age: 55
End: 2025-08-04

## 2025-08-04 ENCOUNTER — APPOINTMENT (OUTPATIENT)
Age: 55
End: 2025-08-04

## 2025-08-04 VITALS — WEIGHT: 293 LBS

## 2025-08-04 DIAGNOSIS — G90.50 COMPLEX REGIONAL PAIN SYNDROME I, UNSPECIFIED: ICD-10-CM

## 2025-08-04 DIAGNOSIS — Z87.891 PERSONAL HISTORY OF NICOTINE DEPENDENCE: ICD-10-CM

## 2025-08-04 DIAGNOSIS — E66.01 MORBID (SEVERE) OBESITY DUE TO EXCESS CALORIES: ICD-10-CM

## 2025-08-04 DIAGNOSIS — F11.20 OPIOID DEPENDENCE, UNCOMPLICATED: ICD-10-CM

## 2025-08-04 DIAGNOSIS — R07.81 PLEURODYNIA: ICD-10-CM

## 2025-08-04 LAB
ALBUMIN SERPL ELPH-MCNC: 2.9 G/DL — LOW (ref 3.5–5)
ALP SERPL-CCNC: 144 U/L — HIGH (ref 40–120)
ALT FLD-CCNC: 56 U/L DA — SIGNIFICANT CHANGE UP (ref 10–60)
ANION GAP SERPL CALC-SCNC: -1 MMOL/L — LOW (ref 5–17)
AST SERPL-CCNC: 42 U/L — HIGH (ref 10–40)
BASOPHILS # BLD AUTO: 0.03 K/UL — SIGNIFICANT CHANGE UP (ref 0–0.2)
BASOPHILS NFR BLD AUTO: 0.3 % — SIGNIFICANT CHANGE UP (ref 0–2)
BILIRUB SERPL-MCNC: 0.3 MG/DL — SIGNIFICANT CHANGE UP (ref 0.2–1.2)
BUN SERPL-MCNC: 12 MG/DL — SIGNIFICANT CHANGE UP (ref 7–18)
CALCIUM SERPL-MCNC: 9 MG/DL — SIGNIFICANT CHANGE UP (ref 8.4–10.5)
CHLORIDE SERPL-SCNC: 104 MMOL/L — SIGNIFICANT CHANGE UP (ref 96–108)
CO2 SERPL-SCNC: 33 MMOL/L — HIGH (ref 22–31)
CREAT SERPL-MCNC: 1.01 MG/DL — SIGNIFICANT CHANGE UP (ref 0.5–1.3)
EGFR: 66 ML/MIN/1.73M2 — SIGNIFICANT CHANGE UP
EGFR: 66 ML/MIN/1.73M2 — SIGNIFICANT CHANGE UP
EOSINOPHIL # BLD AUTO: 0.19 K/UL — SIGNIFICANT CHANGE UP (ref 0–0.5)
EOSINOPHIL NFR BLD AUTO: 1.8 % — SIGNIFICANT CHANGE UP (ref 0–6)
GLUCOSE BLDC GLUCOMTR-MCNC: 117 MG/DL — HIGH (ref 70–99)
GLUCOSE BLDC GLUCOMTR-MCNC: 124 MG/DL — HIGH (ref 70–99)
GLUCOSE BLDC GLUCOMTR-MCNC: 158 MG/DL — HIGH (ref 70–99)
GLUCOSE SERPL-MCNC: 151 MG/DL — HIGH (ref 70–99)
HCT VFR BLD CALC: 34.6 % — SIGNIFICANT CHANGE UP (ref 34.5–45)
HGB BLD-MCNC: 11.1 G/DL — LOW (ref 11.5–15.5)
IMM GRANULOCYTES NFR BLD AUTO: 0.3 % — SIGNIFICANT CHANGE UP (ref 0–0.9)
LYMPHOCYTES # BLD AUTO: 4.53 K/UL — HIGH (ref 1–3.3)
LYMPHOCYTES # BLD AUTO: 42 % — SIGNIFICANT CHANGE UP (ref 13–44)
MAGNESIUM SERPL-MCNC: 1.6 MG/DL — SIGNIFICANT CHANGE UP (ref 1.6–2.6)
MCHC RBC-ENTMCNC: 29.5 PG — SIGNIFICANT CHANGE UP (ref 27–34)
MCHC RBC-ENTMCNC: 32.1 G/DL — SIGNIFICANT CHANGE UP (ref 32–36)
MCV RBC AUTO: 92 FL — SIGNIFICANT CHANGE UP (ref 80–100)
MONOCYTES # BLD AUTO: 0.69 K/UL — SIGNIFICANT CHANGE UP (ref 0–0.9)
MONOCYTES NFR BLD AUTO: 6.4 % — SIGNIFICANT CHANGE UP (ref 2–14)
NEUTROPHILS # BLD AUTO: 5.31 K/UL — SIGNIFICANT CHANGE UP (ref 1.8–7.4)
NEUTROPHILS NFR BLD AUTO: 49.2 % — SIGNIFICANT CHANGE UP (ref 43–77)
NRBC BLD AUTO-RTO: 0 /100 WBCS — SIGNIFICANT CHANGE UP (ref 0–0)
PHOSPHATE SERPL-MCNC: 4.3 MG/DL — SIGNIFICANT CHANGE UP (ref 2.5–4.5)
PLATELET # BLD AUTO: 368 K/UL — SIGNIFICANT CHANGE UP (ref 150–400)
POTASSIUM SERPL-MCNC: 4.5 MMOL/L — SIGNIFICANT CHANGE UP (ref 3.5–5.3)
POTASSIUM SERPL-SCNC: 4.5 MMOL/L — SIGNIFICANT CHANGE UP (ref 3.5–5.3)
PROT SERPL-MCNC: 7 G/DL — SIGNIFICANT CHANGE UP (ref 6–8.3)
RBC # BLD: 3.76 M/UL — LOW (ref 3.8–5.2)
RBC # FLD: 13.1 % — SIGNIFICANT CHANGE UP (ref 10.3–14.5)
SODIUM SERPL-SCNC: 136 MMOL/L — SIGNIFICANT CHANGE UP (ref 135–145)
WBC # BLD: 10.78 K/UL — HIGH (ref 3.8–10.5)
WBC # FLD AUTO: 10.78 K/UL — HIGH (ref 3.8–10.5)

## 2025-08-04 PROCEDURE — 83735 ASSAY OF MAGNESIUM: CPT

## 2025-08-04 PROCEDURE — 83935 ASSAY OF URINE OSMOLALITY: CPT

## 2025-08-04 PROCEDURE — 84540 ASSAY OF URINE/UREA-N: CPT

## 2025-08-04 PROCEDURE — 99285 EMERGENCY DEPT VISIT HI MDM: CPT

## 2025-08-04 PROCEDURE — 82962 GLUCOSE BLOOD TEST: CPT

## 2025-08-04 PROCEDURE — 99232 SBSQ HOSP IP/OBS MODERATE 35: CPT | Mod: FS

## 2025-08-04 PROCEDURE — 82570 ASSAY OF URINE CREATININE: CPT

## 2025-08-04 PROCEDURE — 96376 TX/PRO/DX INJ SAME DRUG ADON: CPT

## 2025-08-04 PROCEDURE — 71045 X-RAY EXAM CHEST 1 VIEW: CPT

## 2025-08-04 PROCEDURE — 85025 COMPLETE CBC W/AUTO DIFF WBC: CPT

## 2025-08-04 PROCEDURE — 93005 ELECTROCARDIOGRAM TRACING: CPT

## 2025-08-04 PROCEDURE — 85027 COMPLETE CBC AUTOMATED: CPT

## 2025-08-04 PROCEDURE — 36415 COLL VENOUS BLD VENIPUNCTURE: CPT

## 2025-08-04 PROCEDURE — 80053 COMPREHEN METABOLIC PANEL: CPT

## 2025-08-04 PROCEDURE — 84133 ASSAY OF URINE POTASSIUM: CPT

## 2025-08-04 PROCEDURE — 96374 THER/PROPH/DIAG INJ IV PUSH: CPT

## 2025-08-04 PROCEDURE — 99239 HOSP IP/OBS DSCHRG MGMT >30: CPT

## 2025-08-04 PROCEDURE — 84100 ASSAY OF PHOSPHORUS: CPT

## 2025-08-04 PROCEDURE — 84300 ASSAY OF URINE SODIUM: CPT

## 2025-08-04 RX ORDER — BACLOFEN 10 MG/20ML
20 INJECTION INTRATHECAL EVERY 8 HOURS
Refills: 0 | Status: DISCONTINUED | OUTPATIENT
Start: 2025-08-04 | End: 2025-08-04

## 2025-08-04 RX ADMIN — Medication 1 MILLIGRAM(S): at 06:34

## 2025-08-04 RX ADMIN — Medication 1 MILLIGRAM(S): at 18:28

## 2025-08-04 RX ADMIN — Medication 1000 MILLIGRAM(S): at 06:34

## 2025-08-04 RX ADMIN — OXYCODONE HYDROCHLORIDE 25 MILLIGRAM(S): 30 TABLET ORAL at 15:40

## 2025-08-04 RX ADMIN — Medication 1 MILLIGRAM(S): at 07:34

## 2025-08-04 RX ADMIN — OXYCODONE HYDROCHLORIDE 25 MILLIGRAM(S): 30 TABLET ORAL at 01:51

## 2025-08-04 RX ADMIN — Medication 1 MILLIGRAM(S): at 11:53

## 2025-08-04 RX ADMIN — BACLOFEN 20 MILLIGRAM(S): 10 INJECTION INTRATHECAL at 14:02

## 2025-08-04 RX ADMIN — INSULIN LISPRO 10 UNIT(S): 100 INJECTION, SOLUTION INTRAVENOUS; SUBCUTANEOUS at 08:23

## 2025-08-04 RX ADMIN — PREGABALIN 100 MILLIGRAM(S): 50 CAPSULE ORAL at 06:34

## 2025-08-04 RX ADMIN — OXYCODONE HYDROCHLORIDE 25 MILLIGRAM(S): 30 TABLET ORAL at 16:10

## 2025-08-04 RX ADMIN — LIDOCAINE HYDROCHLORIDE 1 PATCH: 20 JELLY TOPICAL at 10:13

## 2025-08-04 RX ADMIN — INSULIN LISPRO 2: 100 INJECTION, SOLUTION INTRAVENOUS; SUBCUTANEOUS at 08:23

## 2025-08-04 RX ADMIN — OXYCODONE HYDROCHLORIDE 25 MILLIGRAM(S): 30 TABLET ORAL at 08:24

## 2025-08-04 RX ADMIN — PREGABALIN 100 MILLIGRAM(S): 50 CAPSULE ORAL at 14:02

## 2025-08-04 RX ADMIN — Medication 1000 MILLIGRAM(S): at 14:02

## 2025-08-04 RX ADMIN — LIDOCAINE HYDROCHLORIDE 1 PATCH: 20 JELLY TOPICAL at 20:23

## 2025-08-04 RX ADMIN — Medication 1000 MILLIGRAM(S): at 14:32

## 2025-08-04 RX ADMIN — Medication 1000 MILLIGRAM(S): at 07:34

## 2025-08-04 RX ADMIN — Medication 1 MILLIGRAM(S): at 18:58

## 2025-08-04 RX ADMIN — OXYCODONE HYDROCHLORIDE 25 MILLIGRAM(S): 30 TABLET ORAL at 08:54

## 2025-08-04 RX ADMIN — ENOXAPARIN SODIUM 40 MILLIGRAM(S): 100 INJECTION SUBCUTANEOUS at 06:34

## 2025-08-04 RX ADMIN — Medication 1 MILLIGRAM(S): at 11:23

## 2025-08-04 RX ADMIN — OXYCODONE HYDROCHLORIDE 25 MILLIGRAM(S): 30 TABLET ORAL at 00:47

## 2025-08-04 RX ADMIN — POLYETHYLENE GLYCOL 3350 17 GRAM(S): 17 POWDER, FOR SOLUTION ORAL at 11:23

## 2025-08-05 ENCOUNTER — NON-APPOINTMENT (OUTPATIENT)
Age: 55
End: 2025-08-05

## 2025-08-06 ENCOUNTER — APPOINTMENT (OUTPATIENT)
Dept: HOME HEALTH SERVICES | Facility: HOME HEALTH | Age: 55
End: 2025-08-06

## 2025-08-06 DIAGNOSIS — M62.838 OTHER MUSCLE SPASM: ICD-10-CM

## 2025-08-06 DIAGNOSIS — E11.49 TYPE 2 DIABETES MELLITUS WITH OTHER DIABETIC NEUROLOGICAL COMPLICATION: ICD-10-CM

## 2025-08-06 DIAGNOSIS — G43.909 MIGRAINE, UNSPECIFIED, NOT INTRACTABLE, W/OUT STATUS MIGRAINOSUS: ICD-10-CM

## 2025-08-06 DIAGNOSIS — G58.8 OTHER SPECIFIED MONONEUROPATHIES: ICD-10-CM

## 2025-08-06 PROCEDURE — 99496 TRANSJ CARE MGMT HIGH F2F 7D: CPT | Mod: 2W

## 2025-08-06 RX ORDER — MIRTAZAPINE 15 MG/1
15 TABLET, FILM COATED ORAL
Qty: 30 | Refills: 0 | Status: ACTIVE | COMMUNITY
Start: 2025-08-01

## 2025-08-06 RX ORDER — ESCITALOPRAM OXALATE 10 MG/1
10 TABLET ORAL
Qty: 30 | Refills: 0 | Status: ACTIVE | COMMUNITY
Start: 2025-08-01

## 2025-08-06 RX ORDER — BLOOD-GLUCOSE SENSOR
EACH MISCELLANEOUS
Qty: 2 | Refills: 3 | Status: ACTIVE | COMMUNITY
Start: 2025-08-06 | End: 1900-01-01

## 2025-08-06 RX ORDER — TIRZEPATIDE 2.5 MG/.5ML
2.5 INJECTION, SOLUTION SUBCUTANEOUS
Qty: 2 | Refills: 0 | Status: ACTIVE | COMMUNITY
Start: 2025-07-30

## 2025-08-06 RX ORDER — FLASH GLUCOSE SCANNING READER
EACH MISCELLANEOUS
Qty: 1 | Refills: 0 | Status: ACTIVE | COMMUNITY
Start: 2025-08-06 | End: 1900-01-01

## 2025-08-26 ENCOUNTER — NON-APPOINTMENT (OUTPATIENT)
Age: 55
End: 2025-08-26

## 2025-08-26 ENCOUNTER — INPATIENT (INPATIENT)
Facility: HOSPITAL | Age: 55
LOS: 2 days | Discharge: HOME | DRG: 204 | End: 2025-08-29
Attending: INTERNAL MEDICINE | Admitting: INTERNAL MEDICINE
Payer: MEDICARE

## 2025-08-26 VITALS
RESPIRATION RATE: 18 BRPM | SYSTOLIC BLOOD PRESSURE: 120 MMHG | HEART RATE: 56 BPM | WEIGHT: 293 LBS | TEMPERATURE: 98 F | DIASTOLIC BLOOD PRESSURE: 70 MMHG | OXYGEN SATURATION: 95 % | HEIGHT: 68 IN

## 2025-08-26 DIAGNOSIS — R06.02 SHORTNESS OF BREATH: ICD-10-CM

## 2025-08-26 DIAGNOSIS — Z90.49 ACQUIRED ABSENCE OF OTHER SPECIFIED PARTS OF DIGESTIVE TRACT: Chronic | ICD-10-CM

## 2025-08-26 DIAGNOSIS — Z96.82 PRESENCE OF NEUROSTIMULATOR: Chronic | ICD-10-CM

## 2025-08-26 DIAGNOSIS — Z98.890 OTHER SPECIFIED POSTPROCEDURAL STATES: Chronic | ICD-10-CM

## 2025-08-26 LAB
ALBUMIN SERPL ELPH-MCNC: 2.8 G/DL — LOW (ref 3.5–5)
ALBUMIN SERPL ELPH-MCNC: 2.9 G/DL — LOW (ref 3.5–5)
ALP SERPL-CCNC: 118 U/L — SIGNIFICANT CHANGE UP (ref 40–120)
ALP SERPL-CCNC: 123 U/L — HIGH (ref 40–120)
ALT FLD-CCNC: 24 U/L DA — SIGNIFICANT CHANGE UP (ref 10–60)
ALT FLD-CCNC: 25 U/L DA — SIGNIFICANT CHANGE UP (ref 10–60)
ANION GAP SERPL CALC-SCNC: 2 MMOL/L — LOW (ref 5–17)
ANION GAP SERPL CALC-SCNC: 5 MMOL/L — SIGNIFICANT CHANGE UP (ref 5–17)
APPEARANCE UR: CLEAR — SIGNIFICANT CHANGE UP
APTT BLD: 36.9 SEC — HIGH (ref 26.1–36.8)
AST SERPL-CCNC: 15 U/L — SIGNIFICANT CHANGE UP (ref 10–40)
AST SERPL-CCNC: 25 U/L — SIGNIFICANT CHANGE UP (ref 10–40)
BASE EXCESS BLDV CALC-SCNC: -0.2 MMOL/L — SIGNIFICANT CHANGE UP
BASOPHILS # BLD AUTO: 0.03 K/UL — SIGNIFICANT CHANGE UP (ref 0–0.2)
BASOPHILS NFR BLD AUTO: 0.3 % — SIGNIFICANT CHANGE UP (ref 0–2)
BILIRUB SERPL-MCNC: 0.4 MG/DL — SIGNIFICANT CHANGE UP (ref 0.2–1.2)
BILIRUB SERPL-MCNC: 0.4 MG/DL — SIGNIFICANT CHANGE UP (ref 0.2–1.2)
BILIRUB UR-MCNC: NEGATIVE — SIGNIFICANT CHANGE UP
BUN SERPL-MCNC: 10 MG/DL — SIGNIFICANT CHANGE UP (ref 7–18)
BUN SERPL-MCNC: 10 MG/DL — SIGNIFICANT CHANGE UP (ref 7–18)
CA-I SERPL-SCNC: SIGNIFICANT CHANGE UP MMOL/L (ref 1.15–1.33)
CALCIUM SERPL-MCNC: 8.2 MG/DL — LOW (ref 8.4–10.5)
CALCIUM SERPL-MCNC: 8.5 MG/DL — SIGNIFICANT CHANGE UP (ref 8.4–10.5)
CHLORIDE SERPL-SCNC: 108 MMOL/L — SIGNIFICANT CHANGE UP (ref 96–108)
CHLORIDE SERPL-SCNC: 108 MMOL/L — SIGNIFICANT CHANGE UP (ref 96–108)
CO2 SERPL-SCNC: 25 MMOL/L — SIGNIFICANT CHANGE UP (ref 22–31)
CO2 SERPL-SCNC: 28 MMOL/L — SIGNIFICANT CHANGE UP (ref 22–31)
COLOR SPEC: YELLOW — SIGNIFICANT CHANGE UP
CREAT SERPL-MCNC: 0.87 MG/DL — SIGNIFICANT CHANGE UP (ref 0.5–1.3)
CREAT SERPL-MCNC: 0.94 MG/DL — SIGNIFICANT CHANGE UP (ref 0.5–1.3)
DIFF PNL FLD: NEGATIVE — SIGNIFICANT CHANGE UP
EGFR: 72 ML/MIN/1.73M2 — SIGNIFICANT CHANGE UP
EGFR: 72 ML/MIN/1.73M2 — SIGNIFICANT CHANGE UP
EGFR: 79 ML/MIN/1.73M2 — SIGNIFICANT CHANGE UP
EGFR: 79 ML/MIN/1.73M2 — SIGNIFICANT CHANGE UP
EOSINOPHIL # BLD AUTO: 0.17 K/UL — SIGNIFICANT CHANGE UP (ref 0–0.5)
EOSINOPHIL NFR BLD AUTO: 1.6 % — SIGNIFICANT CHANGE UP (ref 0–6)
FLUAV AG NPH QL: SIGNIFICANT CHANGE UP
FLUBV AG NPH QL: SIGNIFICANT CHANGE UP
GAS PNL BLDV: 137 MMOL/L — SIGNIFICANT CHANGE UP (ref 136–145)
GAS PNL BLDV: SIGNIFICANT CHANGE UP
GLUCOSE BLDC GLUCOMTR-MCNC: 180 MG/DL — HIGH (ref 70–99)
GLUCOSE BLDV-MCNC: 167 MG/DL — HIGH (ref 70–99)
GLUCOSE SERPL-MCNC: 140 MG/DL — HIGH (ref 70–99)
GLUCOSE SERPL-MCNC: 154 MG/DL — HIGH (ref 70–99)
GLUCOSE UR QL: NEGATIVE MG/DL — SIGNIFICANT CHANGE UP
HCG SERPL-ACNC: <1 MIU/ML — SIGNIFICANT CHANGE UP
HCG SERPL-ACNC: <1 MIU/ML — SIGNIFICANT CHANGE UP
HCO3 BLDV-SCNC: 25 MMOL/L — SIGNIFICANT CHANGE UP (ref 22–29)
HCT VFR BLD CALC: 34.2 % — LOW (ref 34.5–45)
HGB BLD-MCNC: 11.1 G/DL — LOW (ref 11.5–15.5)
IMM GRANULOCYTES NFR BLD AUTO: 0.3 % — SIGNIFICANT CHANGE UP (ref 0–0.9)
INR BLD: 0.95 RATIO — SIGNIFICANT CHANGE UP (ref 0.85–1.16)
KETONES UR QL: ABNORMAL MG/DL
LACTATE BLDV-MCNC: 1.6 MMOL/L — SIGNIFICANT CHANGE UP (ref 0.5–2)
LEUKOCYTE ESTERASE UR-ACNC: NEGATIVE — SIGNIFICANT CHANGE UP
LYMPHOCYTES # BLD AUTO: 3.57 K/UL — HIGH (ref 1–3.3)
LYMPHOCYTES # BLD AUTO: 33.9 % — SIGNIFICANT CHANGE UP (ref 13–44)
MCHC RBC-ENTMCNC: 29.8 PG — SIGNIFICANT CHANGE UP (ref 27–34)
MCHC RBC-ENTMCNC: 32.5 G/DL — SIGNIFICANT CHANGE UP (ref 32–36)
MCV RBC AUTO: 91.7 FL — SIGNIFICANT CHANGE UP (ref 80–100)
MONOCYTES # BLD AUTO: 0.54 K/UL — SIGNIFICANT CHANGE UP (ref 0–0.9)
MONOCYTES NFR BLD AUTO: 5.1 % — SIGNIFICANT CHANGE UP (ref 2–14)
NEUTROPHILS # BLD AUTO: 6.19 K/UL — SIGNIFICANT CHANGE UP (ref 1.8–7.4)
NEUTROPHILS NFR BLD AUTO: 58.8 % — SIGNIFICANT CHANGE UP (ref 43–77)
NITRITE UR-MCNC: NEGATIVE — SIGNIFICANT CHANGE UP
NRBC BLD AUTO-RTO: 0 /100 WBCS — SIGNIFICANT CHANGE UP (ref 0–0)
NT-PROBNP SERPL-SCNC: 204 PG/ML — HIGH (ref 0–125)
NT-PROBNP SERPL-SCNC: 214 PG/ML — HIGH (ref 0–125)
PCO2 BLDV: 44 MMHG — HIGH (ref 39–42)
PH BLDV: 7.37 — SIGNIFICANT CHANGE UP (ref 7.32–7.43)
PH UR: 5 — SIGNIFICANT CHANGE UP (ref 5–8)
PLATELET # BLD AUTO: 395 K/UL — SIGNIFICANT CHANGE UP (ref 150–400)
PO2 BLDV: 31 MMHG — SIGNIFICANT CHANGE UP
POTASSIUM BLDV-SCNC: 4.7 MMOL/L — SIGNIFICANT CHANGE UP (ref 3.5–5.1)
POTASSIUM SERPL-MCNC: 3.8 MMOL/L — SIGNIFICANT CHANGE UP (ref 3.5–5.3)
POTASSIUM SERPL-MCNC: 4.1 MMOL/L — SIGNIFICANT CHANGE UP (ref 3.5–5.3)
POTASSIUM SERPL-SCNC: 3.8 MMOL/L — SIGNIFICANT CHANGE UP (ref 3.5–5.3)
POTASSIUM SERPL-SCNC: 4.1 MMOL/L — SIGNIFICANT CHANGE UP (ref 3.5–5.3)
PROT SERPL-MCNC: 7 G/DL — SIGNIFICANT CHANGE UP (ref 6–8.3)
PROT SERPL-MCNC: 7.1 G/DL — SIGNIFICANT CHANGE UP (ref 6–8.3)
PROT UR-MCNC: NEGATIVE MG/DL — SIGNIFICANT CHANGE UP
PROTHROM AB SERPL-ACNC: 11 SEC — SIGNIFICANT CHANGE UP (ref 9.9–13.4)
RBC # BLD: 3.73 M/UL — LOW (ref 3.8–5.2)
RBC # FLD: 12.9 % — SIGNIFICANT CHANGE UP (ref 10.3–14.5)
RSV RNA NPH QL NAA+NON-PROBE: SIGNIFICANT CHANGE UP
SAO2 % BLDV: 50.7 % — SIGNIFICANT CHANGE UP
SARS-COV-2 RNA SPEC QL NAA+PROBE: SIGNIFICANT CHANGE UP
SODIUM SERPL-SCNC: 138 MMOL/L — SIGNIFICANT CHANGE UP (ref 135–145)
SODIUM SERPL-SCNC: 138 MMOL/L — SIGNIFICANT CHANGE UP (ref 135–145)
SOURCE RESPIRATORY: SIGNIFICANT CHANGE UP
SP GR SPEC: 1.02 — SIGNIFICANT CHANGE UP (ref 1–1.03)
TROPONIN I, HIGH SENSITIVITY RESULT: 15.5 NG/L — SIGNIFICANT CHANGE UP
TROPONIN I, HIGH SENSITIVITY RESULT: 17.2 NG/L — SIGNIFICANT CHANGE UP
UROBILINOGEN FLD QL: 0.2 MG/DL — SIGNIFICANT CHANGE UP (ref 0.2–1)
WBC # BLD: 10.53 K/UL — HIGH (ref 3.8–10.5)
WBC # FLD AUTO: 10.53 K/UL — HIGH (ref 3.8–10.5)

## 2025-08-26 PROCEDURE — 84132 ASSAY OF SERUM POTASSIUM: CPT

## 2025-08-26 PROCEDURE — 93970 EXTREMITY STUDY: CPT

## 2025-08-26 PROCEDURE — 99222 1ST HOSP IP/OBS MODERATE 55: CPT

## 2025-08-26 PROCEDURE — 85610 PROTHROMBIN TIME: CPT

## 2025-08-26 PROCEDURE — 84295 ASSAY OF SERUM SODIUM: CPT

## 2025-08-26 PROCEDURE — 83880 ASSAY OF NATRIURETIC PEPTIDE: CPT

## 2025-08-26 PROCEDURE — 84702 CHORIONIC GONADOTROPIN TEST: CPT

## 2025-08-26 PROCEDURE — 80053 COMPREHEN METABOLIC PANEL: CPT

## 2025-08-26 PROCEDURE — 82330 ASSAY OF CALCIUM: CPT

## 2025-08-26 PROCEDURE — 83605 ASSAY OF LACTIC ACID: CPT

## 2025-08-26 PROCEDURE — 85730 THROMBOPLASTIN TIME PARTIAL: CPT

## 2025-08-26 PROCEDURE — 84484 ASSAY OF TROPONIN QUANT: CPT

## 2025-08-26 PROCEDURE — 71045 X-RAY EXAM CHEST 1 VIEW: CPT | Mod: 26

## 2025-08-26 PROCEDURE — 93970 EXTREMITY STUDY: CPT | Mod: 26

## 2025-08-26 PROCEDURE — 82947 ASSAY GLUCOSE BLOOD QUANT: CPT

## 2025-08-26 PROCEDURE — 36415 COLL VENOUS BLD VENIPUNCTURE: CPT

## 2025-08-26 PROCEDURE — 99285 EMERGENCY DEPT VISIT HI MDM: CPT

## 2025-08-26 PROCEDURE — 85025 COMPLETE CBC W/AUTO DIFF WBC: CPT

## 2025-08-26 PROCEDURE — 81003 URINALYSIS AUTO W/O SCOPE: CPT

## 2025-08-26 PROCEDURE — 87086 URINE CULTURE/COLONY COUNT: CPT

## 2025-08-26 PROCEDURE — 82803 BLOOD GASES ANY COMBINATION: CPT

## 2025-08-26 PROCEDURE — 87637 SARSCOV2&INF A&B&RSV AMP PRB: CPT

## 2025-08-26 PROCEDURE — 71045 X-RAY EXAM CHEST 1 VIEW: CPT

## 2025-08-26 RX ORDER — OXYCODONE HYDROCHLORIDE 30 MG/1
20 TABLET ORAL ONCE
Refills: 0 | Status: DISCONTINUED | OUTPATIENT
Start: 2025-08-26 | End: 2025-08-26

## 2025-08-26 RX ORDER — GLUCAGON 3 MG/1
1 POWDER NASAL ONCE
Refills: 0 | Status: DISCONTINUED | OUTPATIENT
Start: 2025-08-26 | End: 2025-08-29

## 2025-08-26 RX ORDER — OXYCODONE HYDROCHLORIDE 30 MG/1
20 TABLET ORAL EVERY 6 HOURS
Refills: 0 | Status: DISCONTINUED | OUTPATIENT
Start: 2025-08-26 | End: 2025-08-29

## 2025-08-26 RX ORDER — ENOXAPARIN SODIUM 100 MG/ML
40 INJECTION SUBCUTANEOUS EVERY 12 HOURS
Refills: 0 | Status: DISCONTINUED | OUTPATIENT
Start: 2025-08-26 | End: 2025-08-29

## 2025-08-26 RX ORDER — SPIRONOLACTONE 25 MG
1 TABLET ORAL
Refills: 0 | DISCHARGE

## 2025-08-26 RX ORDER — HYDROMORPHONE/SOD CHLOR,ISO/PF 2 MG/10 ML
1 SYRINGE (ML) INJECTION EVERY 4 HOURS
Refills: 0 | Status: DISCONTINUED | OUTPATIENT
Start: 2025-08-26 | End: 2025-08-26

## 2025-08-26 RX ORDER — MONTELUKAST SODIUM 10 MG/1
10 TABLET ORAL DAILY
Refills: 0 | Status: DISCONTINUED | OUTPATIENT
Start: 2025-08-26 | End: 2025-08-29

## 2025-08-26 RX ORDER — DEXTROSE 50 % IN WATER 50 %
15 SYRINGE (ML) INTRAVENOUS ONCE
Refills: 0 | Status: DISCONTINUED | OUTPATIENT
Start: 2025-08-26 | End: 2025-08-29

## 2025-08-26 RX ORDER — BUMETANIDE 1 MG/1
1 TABLET ORAL DAILY
Refills: 0 | Status: DISCONTINUED | OUTPATIENT
Start: 2025-08-26 | End: 2025-08-27

## 2025-08-26 RX ORDER — OXYCODONE HYDROCHLORIDE 30 MG/1
20 TABLET ORAL EVERY 8 HOURS
Refills: 0 | Status: DISCONTINUED | OUTPATIENT
Start: 2025-08-26 | End: 2025-08-26

## 2025-08-26 RX ORDER — BACLOFEN 10 MG/20ML
20 INJECTION INTRATHECAL EVERY 8 HOURS
Refills: 0 | Status: DISCONTINUED | OUTPATIENT
Start: 2025-08-26 | End: 2025-08-29

## 2025-08-26 RX ORDER — ALPRAZOLAM 0.5 MG
0.5 TABLET, EXTENDED RELEASE 24 HR ORAL THREE TIMES A DAY
Refills: 0 | Status: DISCONTINUED | OUTPATIENT
Start: 2025-08-26 | End: 2025-08-29

## 2025-08-26 RX ORDER — SENNA 187 MG
2 TABLET ORAL AT BEDTIME
Refills: 0 | Status: DISCONTINUED | OUTPATIENT
Start: 2025-08-26 | End: 2025-08-29

## 2025-08-26 RX ORDER — POLYETHYLENE GLYCOL 3350 17 G/17G
17 POWDER, FOR SOLUTION ORAL DAILY
Refills: 0 | Status: DISCONTINUED | OUTPATIENT
Start: 2025-08-26 | End: 2025-08-29

## 2025-08-26 RX ORDER — HYDROMORPHONE/SOD CHLOR,ISO/PF 2 MG/10 ML
1 SYRINGE (ML) INJECTION ONCE
Refills: 0 | Status: DISCONTINUED | OUTPATIENT
Start: 2025-08-26 | End: 2025-08-26

## 2025-08-26 RX ORDER — DEXTROSE 50 % IN WATER 50 %
12.5 SYRINGE (ML) INTRAVENOUS ONCE
Refills: 0 | Status: DISCONTINUED | OUTPATIENT
Start: 2025-08-26 | End: 2025-08-29

## 2025-08-26 RX ORDER — ENOXAPARIN SODIUM 100 MG/ML
40 INJECTION SUBCUTANEOUS EVERY 24 HOURS
Refills: 0 | Status: DISCONTINUED | OUTPATIENT
Start: 2025-08-26 | End: 2025-08-26

## 2025-08-26 RX ORDER — PREGABALIN 50 MG/1
100 CAPSULE ORAL THREE TIMES A DAY
Refills: 0 | Status: DISCONTINUED | OUTPATIENT
Start: 2025-08-26 | End: 2025-08-29

## 2025-08-26 RX ORDER — BACLOFEN 10 MG/20ML
20 INJECTION INTRATHECAL ONCE
Refills: 0 | Status: COMPLETED | OUTPATIENT
Start: 2025-08-26 | End: 2025-08-26

## 2025-08-26 RX ORDER — HYDROMORPHONE/SOD CHLOR,ISO/PF 2 MG/10 ML
1 SYRINGE (ML) INJECTION EVERY 4 HOURS
Refills: 0 | Status: DISCONTINUED | OUTPATIENT
Start: 2025-08-26 | End: 2025-08-28

## 2025-08-26 RX ORDER — SODIUM CHLORIDE 9 G/1000ML
1000 INJECTION, SOLUTION INTRAVENOUS
Refills: 0 | Status: DISCONTINUED | OUTPATIENT
Start: 2025-08-26 | End: 2025-08-29

## 2025-08-26 RX ORDER — DEXTROSE 50 % IN WATER 50 %
25 SYRINGE (ML) INTRAVENOUS ONCE
Refills: 0 | Status: DISCONTINUED | OUTPATIENT
Start: 2025-08-26 | End: 2025-08-29

## 2025-08-26 RX ORDER — INSULIN LISPRO 100 U/ML
INJECTION, SOLUTION INTRAVENOUS; SUBCUTANEOUS
Refills: 0 | Status: DISCONTINUED | OUTPATIENT
Start: 2025-08-26 | End: 2025-08-29

## 2025-08-26 RX ORDER — INSULIN LISPRO 100 U/ML
INJECTION, SOLUTION INTRAVENOUS; SUBCUTANEOUS AT BEDTIME
Refills: 0 | Status: DISCONTINUED | OUTPATIENT
Start: 2025-08-26 | End: 2025-08-29

## 2025-08-26 RX ORDER — INSULIN LISPRO 100 U/ML
10 INJECTION, SOLUTION INTRAVENOUS; SUBCUTANEOUS
Refills: 0 | Status: DISCONTINUED | OUTPATIENT
Start: 2025-08-26 | End: 2025-08-29

## 2025-08-26 RX ORDER — BUMETANIDE 1 MG/1
1 TABLET ORAL ONCE
Refills: 0 | Status: COMPLETED | OUTPATIENT
Start: 2025-08-26 | End: 2025-08-26

## 2025-08-26 RX ORDER — INSULIN GLARGINE-YFGN 100 [IU]/ML
40 INJECTION, SOLUTION SUBCUTANEOUS AT BEDTIME
Refills: 0 | Status: DISCONTINUED | OUTPATIENT
Start: 2025-08-26 | End: 2025-08-29

## 2025-08-26 RX ORDER — ATORVASTATIN CALCIUM 80 MG/1
20 TABLET, FILM COATED ORAL AT BEDTIME
Refills: 0 | Status: DISCONTINUED | OUTPATIENT
Start: 2025-08-26 | End: 2025-08-29

## 2025-08-26 RX ORDER — ONDANSETRON HCL/PF 4 MG/2 ML
4 VIAL (ML) INJECTION EVERY 8 HOURS
Refills: 0 | Status: DISCONTINUED | OUTPATIENT
Start: 2025-08-26 | End: 2025-08-29

## 2025-08-26 RX ADMIN — Medication 0.5 MILLIGRAM(S): at 23:26

## 2025-08-26 RX ADMIN — OXYCODONE HYDROCHLORIDE 20 MILLIGRAM(S): 30 TABLET ORAL at 20:31

## 2025-08-26 RX ADMIN — PREGABALIN 100 MILLIGRAM(S): 50 CAPSULE ORAL at 23:11

## 2025-08-26 RX ADMIN — Medication 1 MILLIGRAM(S): at 23:18

## 2025-08-26 RX ADMIN — Medication 1 MILLIGRAM(S): at 19:00

## 2025-08-26 RX ADMIN — INSULIN GLARGINE-YFGN 40 UNIT(S): 100 INJECTION, SOLUTION SUBCUTANEOUS at 23:11

## 2025-08-26 RX ADMIN — BUMETANIDE 1 MILLIGRAM(S): 1 TABLET ORAL at 20:31

## 2025-08-26 RX ADMIN — Medication 2 TABLET(S): at 23:11

## 2025-08-26 RX ADMIN — ATORVASTATIN CALCIUM 20 MILLIGRAM(S): 80 TABLET, FILM COATED ORAL at 23:10

## 2025-08-26 RX ADMIN — BACLOFEN 20 MILLIGRAM(S): 10 INJECTION INTRATHECAL at 18:01

## 2025-08-27 ENCOUNTER — NON-APPOINTMENT (OUTPATIENT)
Age: 55
End: 2025-08-27

## 2025-08-27 DIAGNOSIS — R60.1 GENERALIZED EDEMA: ICD-10-CM

## 2025-08-27 DIAGNOSIS — E66.01 MORBID (SEVERE) OBESITY DUE TO EXCESS CALORIES: ICD-10-CM

## 2025-08-27 DIAGNOSIS — Z29.9 ENCOUNTER FOR PROPHYLACTIC MEASURES, UNSPECIFIED: ICD-10-CM

## 2025-08-27 DIAGNOSIS — I10 ESSENTIAL (PRIMARY) HYPERTENSION: ICD-10-CM

## 2025-08-27 DIAGNOSIS — E11.9 TYPE 2 DIABETES MELLITUS WITHOUT COMPLICATIONS: ICD-10-CM

## 2025-08-27 DIAGNOSIS — G90.50 COMPLEX REGIONAL PAIN SYNDROME I, UNSPECIFIED: ICD-10-CM

## 2025-08-27 DIAGNOSIS — R06.02 SHORTNESS OF BREATH: ICD-10-CM

## 2025-08-27 LAB
A1C WITH ESTIMATED AVERAGE GLUCOSE RESULT: 6.3 % — HIGH (ref 4–5.6)
ALBUMIN SERPL ELPH-MCNC: 2.7 G/DL — LOW (ref 3.5–5)
ALP SERPL-CCNC: 111 U/L — SIGNIFICANT CHANGE UP (ref 40–120)
ALT FLD-CCNC: 25 U/L DA — SIGNIFICANT CHANGE UP (ref 10–60)
ANION GAP SERPL CALC-SCNC: 6 MMOL/L — SIGNIFICANT CHANGE UP (ref 5–17)
AST SERPL-CCNC: 15 U/L — SIGNIFICANT CHANGE UP (ref 10–40)
BASOPHILS # BLD AUTO: 0.02 K/UL — SIGNIFICANT CHANGE UP (ref 0–0.2)
BASOPHILS NFR BLD AUTO: 0.2 % — SIGNIFICANT CHANGE UP (ref 0–2)
BILIRUB SERPL-MCNC: 0.5 MG/DL — SIGNIFICANT CHANGE UP (ref 0.2–1.2)
BUN SERPL-MCNC: 9 MG/DL — SIGNIFICANT CHANGE UP (ref 7–18)
CALCIUM SERPL-MCNC: 8.3 MG/DL — LOW (ref 8.4–10.5)
CHLORIDE SERPL-SCNC: 107 MMOL/L — SIGNIFICANT CHANGE UP (ref 96–108)
CO2 SERPL-SCNC: 27 MMOL/L — SIGNIFICANT CHANGE UP (ref 22–31)
CREAT ?TM UR-MCNC: 90 MG/DL — SIGNIFICANT CHANGE UP
CREAT SERPL-MCNC: 0.8 MG/DL — SIGNIFICANT CHANGE UP (ref 0.5–1.3)
EGFR: 87 ML/MIN/1.73M2 — SIGNIFICANT CHANGE UP
EGFR: 87 ML/MIN/1.73M2 — SIGNIFICANT CHANGE UP
EOSINOPHIL # BLD AUTO: 0.16 K/UL — SIGNIFICANT CHANGE UP (ref 0–0.5)
EOSINOPHIL NFR BLD AUTO: 1.7 % — SIGNIFICANT CHANGE UP (ref 0–6)
ESTIMATED AVERAGE GLUCOSE: 134 MG/DL — HIGH (ref 68–114)
GLUCOSE BLDC GLUCOMTR-MCNC: 115 MG/DL — HIGH (ref 70–99)
GLUCOSE BLDC GLUCOMTR-MCNC: 133 MG/DL — HIGH (ref 70–99)
GLUCOSE BLDC GLUCOMTR-MCNC: 158 MG/DL — HIGH (ref 70–99)
GLUCOSE BLDC GLUCOMTR-MCNC: 160 MG/DL — HIGH (ref 70–99)
GLUCOSE BLDC GLUCOMTR-MCNC: 165 MG/DL — HIGH (ref 70–99)
GLUCOSE SERPL-MCNC: 114 MG/DL — HIGH (ref 70–99)
HCT VFR BLD CALC: 34.3 % — LOW (ref 34.5–45)
HGB BLD-MCNC: 11 G/DL — LOW (ref 11.5–15.5)
IMM GRANULOCYTES NFR BLD AUTO: 0.3 % — SIGNIFICANT CHANGE UP (ref 0–0.9)
LYMPHOCYTES # BLD AUTO: 3.37 K/UL — HIGH (ref 1–3.3)
LYMPHOCYTES # BLD AUTO: 36 % — SIGNIFICANT CHANGE UP (ref 13–44)
MAGNESIUM SERPL-MCNC: 1.3 MG/DL — LOW (ref 1.6–2.6)
MCHC RBC-ENTMCNC: 29.5 PG — SIGNIFICANT CHANGE UP (ref 27–34)
MCHC RBC-ENTMCNC: 32.1 G/DL — SIGNIFICANT CHANGE UP (ref 32–36)
MCV RBC AUTO: 92 FL — SIGNIFICANT CHANGE UP (ref 80–100)
MONOCYTES # BLD AUTO: 0.56 K/UL — SIGNIFICANT CHANGE UP (ref 0–0.9)
MONOCYTES NFR BLD AUTO: 6 % — SIGNIFICANT CHANGE UP (ref 2–14)
NEUTROPHILS # BLD AUTO: 5.23 K/UL — SIGNIFICANT CHANGE UP (ref 1.8–7.4)
NEUTROPHILS NFR BLD AUTO: 55.8 % — SIGNIFICANT CHANGE UP (ref 43–77)
NRBC BLD AUTO-RTO: 0 /100 WBCS — SIGNIFICANT CHANGE UP (ref 0–0)
PHOSPHATE SERPL-MCNC: 4.9 MG/DL — HIGH (ref 2.5–4.5)
PLATELET # BLD AUTO: 405 K/UL — HIGH (ref 150–400)
POTASSIUM SERPL-MCNC: 3.4 MMOL/L — LOW (ref 3.5–5.3)
POTASSIUM SERPL-SCNC: 3.4 MMOL/L — LOW (ref 3.5–5.3)
PROT ?TM UR-MCNC: 12 MG/DL — SIGNIFICANT CHANGE UP (ref 0–12)
PROT SERPL-MCNC: 6.9 G/DL — SIGNIFICANT CHANGE UP (ref 6–8.3)
PROT/CREAT UR-RTO: 0.1 RATIO — SIGNIFICANT CHANGE UP (ref 0–0.2)
RBC # BLD: 3.73 M/UL — LOW (ref 3.8–5.2)
RBC # FLD: 13.1 % — SIGNIFICANT CHANGE UP (ref 10.3–14.5)
SODIUM SERPL-SCNC: 140 MMOL/L — SIGNIFICANT CHANGE UP (ref 135–145)
WBC # BLD: 9.37 K/UL — SIGNIFICANT CHANGE UP (ref 3.8–10.5)
WBC # FLD AUTO: 9.37 K/UL — SIGNIFICANT CHANGE UP (ref 3.8–10.5)

## 2025-08-27 PROCEDURE — 99222 1ST HOSP IP/OBS MODERATE 55: CPT | Mod: FS

## 2025-08-27 PROCEDURE — 36415 COLL VENOUS BLD VENIPUNCTURE: CPT

## 2025-08-27 PROCEDURE — 71045 X-RAY EXAM CHEST 1 VIEW: CPT

## 2025-08-27 PROCEDURE — 83880 ASSAY OF NATRIURETIC PEPTIDE: CPT

## 2025-08-27 PROCEDURE — 84295 ASSAY OF SERUM SODIUM: CPT

## 2025-08-27 PROCEDURE — 99233 SBSQ HOSP IP/OBS HIGH 50: CPT | Mod: GC

## 2025-08-27 PROCEDURE — 85025 COMPLETE CBC W/AUTO DIFF WBC: CPT

## 2025-08-27 PROCEDURE — 84484 ASSAY OF TROPONIN QUANT: CPT

## 2025-08-27 PROCEDURE — 82330 ASSAY OF CALCIUM: CPT

## 2025-08-27 PROCEDURE — 87637 SARSCOV2&INF A&B&RSV AMP PRB: CPT

## 2025-08-27 PROCEDURE — 84132 ASSAY OF SERUM POTASSIUM: CPT

## 2025-08-27 PROCEDURE — 84100 ASSAY OF PHOSPHORUS: CPT

## 2025-08-27 PROCEDURE — 84702 CHORIONIC GONADOTROPIN TEST: CPT

## 2025-08-27 PROCEDURE — 84156 ASSAY OF PROTEIN URINE: CPT

## 2025-08-27 PROCEDURE — 82947 ASSAY GLUCOSE BLOOD QUANT: CPT

## 2025-08-27 PROCEDURE — 93970 EXTREMITY STUDY: CPT

## 2025-08-27 PROCEDURE — 80053 COMPREHEN METABOLIC PANEL: CPT

## 2025-08-27 PROCEDURE — 83605 ASSAY OF LACTIC ACID: CPT

## 2025-08-27 PROCEDURE — 85730 THROMBOPLASTIN TIME PARTIAL: CPT

## 2025-08-27 PROCEDURE — 82962 GLUCOSE BLOOD TEST: CPT

## 2025-08-27 PROCEDURE — 83735 ASSAY OF MAGNESIUM: CPT

## 2025-08-27 PROCEDURE — 82803 BLOOD GASES ANY COMBINATION: CPT

## 2025-08-27 PROCEDURE — 82570 ASSAY OF URINE CREATININE: CPT

## 2025-08-27 PROCEDURE — 87086 URINE CULTURE/COLONY COUNT: CPT

## 2025-08-27 PROCEDURE — 85610 PROTHROMBIN TIME: CPT

## 2025-08-27 PROCEDURE — 83036 HEMOGLOBIN GLYCOSYLATED A1C: CPT

## 2025-08-27 PROCEDURE — 81003 URINALYSIS AUTO W/O SCOPE: CPT

## 2025-08-27 RX ORDER — LIDOCAINE HYDROCHLORIDE 20 MG/ML
2 JELLY TOPICAL DAILY
Refills: 0 | Status: DISCONTINUED | OUTPATIENT
Start: 2025-08-27 | End: 2025-08-29

## 2025-08-27 RX ORDER — LATANOPROST PF 0.05 MG/ML
1 SOLUTION/ DROPS OPHTHALMIC AT BEDTIME
Refills: 0 | Status: DISCONTINUED | OUTPATIENT
Start: 2025-08-27 | End: 2025-08-29

## 2025-08-27 RX ORDER — ACETAMINOPHEN 500 MG/5ML
1000 LIQUID (ML) ORAL EVERY 8 HOURS
Refills: 0 | Status: DISCONTINUED | OUTPATIENT
Start: 2025-08-27 | End: 2025-08-29

## 2025-08-27 RX ORDER — BUMETANIDE 1 MG/1
2 TABLET ORAL DAILY
Refills: 0 | Status: COMPLETED | OUTPATIENT
Start: 2025-08-27 | End: 2025-08-29

## 2025-08-27 RX ORDER — MAGNESIUM SULFATE 500 MG/ML
2 SYRINGE (ML) INJECTION ONCE
Refills: 0 | Status: COMPLETED | OUTPATIENT
Start: 2025-08-27 | End: 2025-08-27

## 2025-08-27 RX ADMIN — PREGABALIN 100 MILLIGRAM(S): 50 CAPSULE ORAL at 21:52

## 2025-08-27 RX ADMIN — Medication 1 MILLIGRAM(S): at 17:45

## 2025-08-27 RX ADMIN — Medication 1000 MILLIGRAM(S): at 13:46

## 2025-08-27 RX ADMIN — Medication 0.5 MILLIGRAM(S): at 21:52

## 2025-08-27 RX ADMIN — OXYCODONE HYDROCHLORIDE 20 MILLIGRAM(S): 30 TABLET ORAL at 19:57

## 2025-08-27 RX ADMIN — ATORVASTATIN CALCIUM 20 MILLIGRAM(S): 80 TABLET, FILM COATED ORAL at 21:52

## 2025-08-27 RX ADMIN — Medication 40 MILLIGRAM(S): at 06:36

## 2025-08-27 RX ADMIN — Medication 1 MILLIGRAM(S): at 16:45

## 2025-08-27 RX ADMIN — MONTELUKAST SODIUM 10 MILLIGRAM(S): 10 TABLET ORAL at 11:48

## 2025-08-27 RX ADMIN — Medication 2 TABLET(S): at 21:52

## 2025-08-27 RX ADMIN — ENOXAPARIN SODIUM 40 MILLIGRAM(S): 100 INJECTION SUBCUTANEOUS at 17:16

## 2025-08-27 RX ADMIN — INSULIN LISPRO 10 UNIT(S): 100 INJECTION, SOLUTION INTRAVENOUS; SUBCUTANEOUS at 11:49

## 2025-08-27 RX ADMIN — INSULIN LISPRO 10 UNIT(S): 100 INJECTION, SOLUTION INTRAVENOUS; SUBCUTANEOUS at 17:17

## 2025-08-27 RX ADMIN — LIDOCAINE HYDROCHLORIDE 2 PATCH: 20 JELLY TOPICAL at 14:09

## 2025-08-27 RX ADMIN — BUMETANIDE 2 MILLIGRAM(S): 1 TABLET ORAL at 06:54

## 2025-08-27 RX ADMIN — INSULIN LISPRO 1: 100 INJECTION, SOLUTION INTRAVENOUS; SUBCUTANEOUS at 11:49

## 2025-08-27 RX ADMIN — Medication 1 MILLIGRAM(S): at 23:05

## 2025-08-27 RX ADMIN — PREGABALIN 100 MILLIGRAM(S): 50 CAPSULE ORAL at 05:27

## 2025-08-27 RX ADMIN — Medication 1000 MILLIGRAM(S): at 14:45

## 2025-08-27 RX ADMIN — Medication 1 MILLIGRAM(S): at 05:27

## 2025-08-27 RX ADMIN — Medication 40 MILLIEQUIVALENT(S): at 13:47

## 2025-08-27 RX ADMIN — Medication 1 MILLIGRAM(S): at 11:50

## 2025-08-27 RX ADMIN — LIDOCAINE HYDROCHLORIDE 2 PATCH: 20 JELLY TOPICAL at 19:50

## 2025-08-27 RX ADMIN — OXYCODONE HYDROCHLORIDE 20 MILLIGRAM(S): 30 TABLET ORAL at 20:57

## 2025-08-27 RX ADMIN — POLYETHYLENE GLYCOL 3350 17 GRAM(S): 17 POWDER, FOR SOLUTION ORAL at 11:48

## 2025-08-27 RX ADMIN — Medication 1000 MILLIGRAM(S): at 22:52

## 2025-08-27 RX ADMIN — Medication 1 MILLIGRAM(S): at 06:30

## 2025-08-27 RX ADMIN — INSULIN LISPRO 10 UNIT(S): 100 INJECTION, SOLUTION INTRAVENOUS; SUBCUTANEOUS at 08:06

## 2025-08-27 RX ADMIN — ENOXAPARIN SODIUM 40 MILLIGRAM(S): 100 INJECTION SUBCUTANEOUS at 05:27

## 2025-08-27 RX ADMIN — Medication 4 MILLIGRAM(S): at 17:16

## 2025-08-27 RX ADMIN — PREGABALIN 100 MILLIGRAM(S): 50 CAPSULE ORAL at 13:46

## 2025-08-27 RX ADMIN — BACLOFEN 20 MILLIGRAM(S): 10 INJECTION INTRATHECAL at 18:13

## 2025-08-27 RX ADMIN — Medication 1000 MILLIGRAM(S): at 21:52

## 2025-08-27 RX ADMIN — Medication 25 GRAM(S): at 13:45

## 2025-08-27 RX ADMIN — INSULIN GLARGINE-YFGN 40 UNIT(S): 100 INJECTION, SOLUTION SUBCUTANEOUS at 21:51

## 2025-08-27 RX ADMIN — Medication 1 MILLIGRAM(S): at 12:58

## 2025-08-28 ENCOUNTER — TRANSCRIPTION ENCOUNTER (OUTPATIENT)
Age: 55
End: 2025-08-28

## 2025-08-28 DIAGNOSIS — F11.20 OPIOID DEPENDENCE, UNCOMPLICATED: ICD-10-CM

## 2025-08-28 DIAGNOSIS — Z96.89 PRESENCE OF OTHER SPECIFIED FUNCTIONAL IMPLANTS: ICD-10-CM

## 2025-08-28 LAB
ALBUMIN SERPL ELPH-MCNC: 2.9 G/DL — LOW (ref 3.5–5)
ALP SERPL-CCNC: 112 U/L — SIGNIFICANT CHANGE UP (ref 40–120)
ALT FLD-CCNC: 25 U/L DA — SIGNIFICANT CHANGE UP (ref 10–60)
ANION GAP SERPL CALC-SCNC: 3 MMOL/L — LOW (ref 5–17)
AST SERPL-CCNC: 15 U/L — SIGNIFICANT CHANGE UP (ref 10–40)
BASOPHILS # BLD AUTO: 0.02 K/UL — SIGNIFICANT CHANGE UP (ref 0–0.2)
BASOPHILS NFR BLD AUTO: 0.2 % — SIGNIFICANT CHANGE UP (ref 0–2)
BILIRUB SERPL-MCNC: 0.6 MG/DL — SIGNIFICANT CHANGE UP (ref 0.2–1.2)
BUN SERPL-MCNC: 12 MG/DL — SIGNIFICANT CHANGE UP (ref 7–18)
CALCIUM SERPL-MCNC: 8 MG/DL — LOW (ref 8.4–10.5)
CHLORIDE SERPL-SCNC: 102 MMOL/L — SIGNIFICANT CHANGE UP (ref 96–108)
CO2 SERPL-SCNC: 31 MMOL/L — SIGNIFICANT CHANGE UP (ref 22–31)
CREAT SERPL-MCNC: 1.04 MG/DL — SIGNIFICANT CHANGE UP (ref 0.5–1.3)
CULTURE RESULTS: NO GROWTH — SIGNIFICANT CHANGE UP
EGFR: 63 ML/MIN/1.73M2 — SIGNIFICANT CHANGE UP
EGFR: 63 ML/MIN/1.73M2 — SIGNIFICANT CHANGE UP
EOSINOPHIL # BLD AUTO: 0.19 K/UL — SIGNIFICANT CHANGE UP (ref 0–0.5)
EOSINOPHIL NFR BLD AUTO: 2.3 % — SIGNIFICANT CHANGE UP (ref 0–6)
GLUCOSE BLDC GLUCOMTR-MCNC: 165 MG/DL — HIGH (ref 70–99)
GLUCOSE BLDC GLUCOMTR-MCNC: 171 MG/DL — HIGH (ref 70–99)
GLUCOSE BLDC GLUCOMTR-MCNC: 173 MG/DL — HIGH (ref 70–99)
GLUCOSE BLDC GLUCOMTR-MCNC: 179 MG/DL — HIGH (ref 70–99)
GLUCOSE SERPL-MCNC: 167 MG/DL — HIGH (ref 70–99)
HCT VFR BLD CALC: 33.6 % — LOW (ref 34.5–45)
HGB BLD-MCNC: 10.9 G/DL — LOW (ref 11.5–15.5)
IMM GRANULOCYTES NFR BLD AUTO: 0.2 % — SIGNIFICANT CHANGE UP (ref 0–0.9)
LYMPHOCYTES # BLD AUTO: 3.3 K/UL — SIGNIFICANT CHANGE UP (ref 1–3.3)
LYMPHOCYTES # BLD AUTO: 40 % — SIGNIFICANT CHANGE UP (ref 13–44)
MAGNESIUM SERPL-MCNC: 1.4 MG/DL — LOW (ref 1.6–2.6)
MCHC RBC-ENTMCNC: 29.6 PG — SIGNIFICANT CHANGE UP (ref 27–34)
MCHC RBC-ENTMCNC: 32.4 G/DL — SIGNIFICANT CHANGE UP (ref 32–36)
MCV RBC AUTO: 91.3 FL — SIGNIFICANT CHANGE UP (ref 80–100)
MONOCYTES # BLD AUTO: 0.57 K/UL — SIGNIFICANT CHANGE UP (ref 0–0.9)
MONOCYTES NFR BLD AUTO: 6.9 % — SIGNIFICANT CHANGE UP (ref 2–14)
NEUTROPHILS # BLD AUTO: 4.15 K/UL — SIGNIFICANT CHANGE UP (ref 1.8–7.4)
NEUTROPHILS NFR BLD AUTO: 50.4 % — SIGNIFICANT CHANGE UP (ref 43–77)
NRBC BLD AUTO-RTO: 0 /100 WBCS — SIGNIFICANT CHANGE UP (ref 0–0)
PHOSPHATE SERPL-MCNC: 5.5 MG/DL — HIGH (ref 2.5–4.5)
PLATELET # BLD AUTO: 390 K/UL — SIGNIFICANT CHANGE UP (ref 150–400)
POTASSIUM SERPL-MCNC: 3.8 MMOL/L — SIGNIFICANT CHANGE UP (ref 3.5–5.3)
POTASSIUM SERPL-SCNC: 3.8 MMOL/L — SIGNIFICANT CHANGE UP (ref 3.5–5.3)
PROT SERPL-MCNC: 7.1 G/DL — SIGNIFICANT CHANGE UP (ref 6–8.3)
RBC # BLD: 3.68 M/UL — LOW (ref 3.8–5.2)
RBC # FLD: 12.8 % — SIGNIFICANT CHANGE UP (ref 10.3–14.5)
SODIUM SERPL-SCNC: 136 MMOL/L — SIGNIFICANT CHANGE UP (ref 135–145)
SPECIMEN SOURCE: SIGNIFICANT CHANGE UP
T4 AB SER-ACNC: 10.5 UG/DL — SIGNIFICANT CHANGE UP (ref 4.6–12)
TSH SERPL-MCNC: 1.65 UU/ML — SIGNIFICANT CHANGE UP (ref 0.34–4.82)
WBC # BLD: 8.25 K/UL — SIGNIFICANT CHANGE UP (ref 3.8–10.5)
WBC # FLD AUTO: 8.25 K/UL — SIGNIFICANT CHANGE UP (ref 3.8–10.5)

## 2025-08-28 PROCEDURE — 93970 EXTREMITY STUDY: CPT

## 2025-08-28 PROCEDURE — 81003 URINALYSIS AUTO W/O SCOPE: CPT

## 2025-08-28 PROCEDURE — 82330 ASSAY OF CALCIUM: CPT

## 2025-08-28 PROCEDURE — 83605 ASSAY OF LACTIC ACID: CPT

## 2025-08-28 PROCEDURE — 83036 HEMOGLOBIN GLYCOSYLATED A1C: CPT

## 2025-08-28 PROCEDURE — 85610 PROTHROMBIN TIME: CPT

## 2025-08-28 PROCEDURE — 93005 ELECTROCARDIOGRAM TRACING: CPT

## 2025-08-28 PROCEDURE — 84702 CHORIONIC GONADOTROPIN TEST: CPT

## 2025-08-28 PROCEDURE — 84156 ASSAY OF PROTEIN URINE: CPT

## 2025-08-28 PROCEDURE — 84295 ASSAY OF SERUM SODIUM: CPT

## 2025-08-28 PROCEDURE — 87637 SARSCOV2&INF A&B&RSV AMP PRB: CPT

## 2025-08-28 PROCEDURE — 85730 THROMBOPLASTIN TIME PARTIAL: CPT

## 2025-08-28 PROCEDURE — 71045 X-RAY EXAM CHEST 1 VIEW: CPT

## 2025-08-28 PROCEDURE — 82570 ASSAY OF URINE CREATININE: CPT

## 2025-08-28 PROCEDURE — 84132 ASSAY OF SERUM POTASSIUM: CPT

## 2025-08-28 PROCEDURE — 83880 ASSAY OF NATRIURETIC PEPTIDE: CPT

## 2025-08-28 PROCEDURE — 82803 BLOOD GASES ANY COMBINATION: CPT

## 2025-08-28 PROCEDURE — 99233 SBSQ HOSP IP/OBS HIGH 50: CPT | Mod: GC

## 2025-08-28 PROCEDURE — 84443 ASSAY THYROID STIM HORMONE: CPT

## 2025-08-28 PROCEDURE — 87086 URINE CULTURE/COLONY COUNT: CPT

## 2025-08-28 PROCEDURE — 84100 ASSAY OF PHOSPHORUS: CPT

## 2025-08-28 PROCEDURE — 82947 ASSAY GLUCOSE BLOOD QUANT: CPT

## 2025-08-28 PROCEDURE — 82962 GLUCOSE BLOOD TEST: CPT

## 2025-08-28 PROCEDURE — 84436 ASSAY OF TOTAL THYROXINE: CPT

## 2025-08-28 PROCEDURE — 99232 SBSQ HOSP IP/OBS MODERATE 35: CPT

## 2025-08-28 PROCEDURE — 85025 COMPLETE CBC W/AUTO DIFF WBC: CPT

## 2025-08-28 PROCEDURE — 36415 COLL VENOUS BLD VENIPUNCTURE: CPT

## 2025-08-28 PROCEDURE — 80053 COMPREHEN METABOLIC PANEL: CPT

## 2025-08-28 PROCEDURE — 84484 ASSAY OF TROPONIN QUANT: CPT

## 2025-08-28 PROCEDURE — 83735 ASSAY OF MAGNESIUM: CPT

## 2025-08-28 RX ORDER — HYDROMORPHONE/SOD CHLOR,ISO/PF 2 MG/10 ML
0.5 SYRINGE (ML) INJECTION EVERY 4 HOURS
Refills: 0 | Status: DISCONTINUED | OUTPATIENT
Start: 2025-08-28 | End: 2025-08-29

## 2025-08-28 RX ORDER — MAGNESIUM SULFATE 500 MG/ML
2 SYRINGE (ML) INJECTION ONCE
Refills: 0 | Status: COMPLETED | OUTPATIENT
Start: 2025-08-28 | End: 2025-08-28

## 2025-08-28 RX ADMIN — INSULIN LISPRO 1: 100 INJECTION, SOLUTION INTRAVENOUS; SUBCUTANEOUS at 17:29

## 2025-08-28 RX ADMIN — Medication 0.5 MILLIGRAM(S): at 17:30

## 2025-08-28 RX ADMIN — INSULIN LISPRO 10 UNIT(S): 100 INJECTION, SOLUTION INTRAVENOUS; SUBCUTANEOUS at 12:50

## 2025-08-28 RX ADMIN — MONTELUKAST SODIUM 10 MILLIGRAM(S): 10 TABLET ORAL at 11:12

## 2025-08-28 RX ADMIN — OXYCODONE HYDROCHLORIDE 20 MILLIGRAM(S): 30 TABLET ORAL at 11:12

## 2025-08-28 RX ADMIN — PREGABALIN 100 MILLIGRAM(S): 50 CAPSULE ORAL at 13:20

## 2025-08-28 RX ADMIN — INSULIN LISPRO 1: 100 INJECTION, SOLUTION INTRAVENOUS; SUBCUTANEOUS at 12:51

## 2025-08-28 RX ADMIN — Medication 2 TABLET(S): at 22:18

## 2025-08-28 RX ADMIN — ENOXAPARIN SODIUM 40 MILLIGRAM(S): 100 INJECTION SUBCUTANEOUS at 17:32

## 2025-08-28 RX ADMIN — INSULIN LISPRO 10 UNIT(S): 100 INJECTION, SOLUTION INTRAVENOUS; SUBCUTANEOUS at 17:28

## 2025-08-28 RX ADMIN — Medication 1000 MILLIGRAM(S): at 14:25

## 2025-08-28 RX ADMIN — Medication 0.5 MILLIGRAM(S): at 22:35

## 2025-08-28 RX ADMIN — BUMETANIDE 2 MILLIGRAM(S): 1 TABLET ORAL at 06:00

## 2025-08-28 RX ADMIN — Medication 0.5 MILLIGRAM(S): at 22:18

## 2025-08-28 RX ADMIN — OXYCODONE HYDROCHLORIDE 20 MILLIGRAM(S): 30 TABLET ORAL at 12:12

## 2025-08-28 RX ADMIN — Medication 40 MILLIGRAM(S): at 06:01

## 2025-08-28 RX ADMIN — ATORVASTATIN CALCIUM 20 MILLIGRAM(S): 80 TABLET, FILM COATED ORAL at 22:18

## 2025-08-28 RX ADMIN — INSULIN GLARGINE-YFGN 40 UNIT(S): 100 INJECTION, SOLUTION SUBCUTANEOUS at 22:18

## 2025-08-28 RX ADMIN — OXYCODONE HYDROCHLORIDE 20 MILLIGRAM(S): 30 TABLET ORAL at 18:40

## 2025-08-28 RX ADMIN — ENOXAPARIN SODIUM 40 MILLIGRAM(S): 100 INJECTION SUBCUTANEOUS at 06:00

## 2025-08-28 RX ADMIN — BACLOFEN 20 MILLIGRAM(S): 10 INJECTION INTRATHECAL at 13:45

## 2025-08-28 RX ADMIN — Medication 1 MILLIGRAM(S): at 00:05

## 2025-08-28 RX ADMIN — INSULIN LISPRO 10 UNIT(S): 100 INJECTION, SOLUTION INTRAVENOUS; SUBCUTANEOUS at 08:05

## 2025-08-28 RX ADMIN — Medication 0.5 MILLIGRAM(S): at 22:17

## 2025-08-28 RX ADMIN — Medication 1000 MILLIGRAM(S): at 22:30

## 2025-08-28 RX ADMIN — LIDOCAINE HYDROCHLORIDE 2 PATCH: 20 JELLY TOPICAL at 02:56

## 2025-08-28 RX ADMIN — LIDOCAINE HYDROCHLORIDE 2 PATCH: 20 JELLY TOPICAL at 11:13

## 2025-08-28 RX ADMIN — Medication 1000 MILLIGRAM(S): at 13:20

## 2025-08-28 RX ADMIN — INSULIN LISPRO 1: 100 INJECTION, SOLUTION INTRAVENOUS; SUBCUTANEOUS at 08:06

## 2025-08-28 RX ADMIN — Medication 1000 MILLIGRAM(S): at 22:18

## 2025-08-28 RX ADMIN — POLYETHYLENE GLYCOL 3350 17 GRAM(S): 17 POWDER, FOR SOLUTION ORAL at 11:13

## 2025-08-28 RX ADMIN — Medication 1000 MILLIGRAM(S): at 06:50

## 2025-08-28 RX ADMIN — Medication 1 MILLIGRAM(S): at 04:59

## 2025-08-28 RX ADMIN — PREGABALIN 100 MILLIGRAM(S): 50 CAPSULE ORAL at 22:18

## 2025-08-28 RX ADMIN — Medication 1 MILLIGRAM(S): at 09:00

## 2025-08-28 RX ADMIN — Medication 25 GRAM(S): at 11:14

## 2025-08-28 RX ADMIN — Medication 1 MILLIGRAM(S): at 10:00

## 2025-08-28 RX ADMIN — PREGABALIN 100 MILLIGRAM(S): 50 CAPSULE ORAL at 06:00

## 2025-08-28 RX ADMIN — Medication 0.5 MILLIGRAM(S): at 18:31

## 2025-08-28 RX ADMIN — Medication 1000 MILLIGRAM(S): at 06:00

## 2025-08-29 ENCOUNTER — TRANSCRIPTION ENCOUNTER (OUTPATIENT)
Age: 55
End: 2025-08-29

## 2025-08-29 VITALS
RESPIRATION RATE: 18 BRPM | DIASTOLIC BLOOD PRESSURE: 66 MMHG | OXYGEN SATURATION: 94 % | TEMPERATURE: 99 F | SYSTOLIC BLOOD PRESSURE: 119 MMHG | HEART RATE: 72 BPM

## 2025-08-29 LAB
ALBUMIN SERPL ELPH-MCNC: 2.9 G/DL — LOW (ref 3.5–5)
ALP SERPL-CCNC: 120 U/L — SIGNIFICANT CHANGE UP (ref 40–120)
ALT FLD-CCNC: 27 U/L DA — SIGNIFICANT CHANGE UP (ref 10–60)
ANION GAP SERPL CALC-SCNC: 6 MMOL/L — SIGNIFICANT CHANGE UP (ref 5–17)
AST SERPL-CCNC: 19 U/L — SIGNIFICANT CHANGE UP (ref 10–40)
BASOPHILS # BLD AUTO: 0.02 K/UL — SIGNIFICANT CHANGE UP (ref 0–0.2)
BASOPHILS NFR BLD AUTO: 0.2 % — SIGNIFICANT CHANGE UP (ref 0–2)
BILIRUB SERPL-MCNC: 0.5 MG/DL — SIGNIFICANT CHANGE UP (ref 0.2–1.2)
BUN SERPL-MCNC: 18 MG/DL — SIGNIFICANT CHANGE UP (ref 7–18)
CALCIUM SERPL-MCNC: 8.6 MG/DL — SIGNIFICANT CHANGE UP (ref 8.4–10.5)
CHLORIDE SERPL-SCNC: 98 MMOL/L — SIGNIFICANT CHANGE UP (ref 96–108)
CO2 SERPL-SCNC: 31 MMOL/L — SIGNIFICANT CHANGE UP (ref 22–31)
CREAT SERPL-MCNC: 1.15 MG/DL — SIGNIFICANT CHANGE UP (ref 0.5–1.3)
EGFR: 56 ML/MIN/1.73M2 — LOW
EGFR: 56 ML/MIN/1.73M2 — LOW
EOSINOPHIL # BLD AUTO: 0.19 K/UL — SIGNIFICANT CHANGE UP (ref 0–0.5)
EOSINOPHIL NFR BLD AUTO: 2.1 % — SIGNIFICANT CHANGE UP (ref 0–6)
GLUCOSE BLDC GLUCOMTR-MCNC: 225 MG/DL — HIGH (ref 70–99)
GLUCOSE BLDC GLUCOMTR-MCNC: 232 MG/DL — HIGH (ref 70–99)
GLUCOSE BLDC GLUCOMTR-MCNC: 259 MG/DL — HIGH (ref 70–99)
GLUCOSE SERPL-MCNC: 197 MG/DL — HIGH (ref 70–99)
HCT VFR BLD CALC: 33.8 % — LOW (ref 34.5–45)
HGB BLD-MCNC: 11 G/DL — LOW (ref 11.5–15.5)
IMM GRANULOCYTES NFR BLD AUTO: 0.2 % — SIGNIFICANT CHANGE UP (ref 0–0.9)
LYMPHOCYTES # BLD AUTO: 3.86 K/UL — HIGH (ref 1–3.3)
LYMPHOCYTES # BLD AUTO: 43.1 % — SIGNIFICANT CHANGE UP (ref 13–44)
MAGNESIUM SERPL-MCNC: 1.7 MG/DL — SIGNIFICANT CHANGE UP (ref 1.6–2.6)
MCHC RBC-ENTMCNC: 30.1 PG — SIGNIFICANT CHANGE UP (ref 27–34)
MCHC RBC-ENTMCNC: 32.5 G/DL — SIGNIFICANT CHANGE UP (ref 32–36)
MCV RBC AUTO: 92.6 FL — SIGNIFICANT CHANGE UP (ref 80–100)
MONOCYTES # BLD AUTO: 0.5 K/UL — SIGNIFICANT CHANGE UP (ref 0–0.9)
MONOCYTES NFR BLD AUTO: 5.6 % — SIGNIFICANT CHANGE UP (ref 2–14)
NEUTROPHILS # BLD AUTO: 4.36 K/UL — SIGNIFICANT CHANGE UP (ref 1.8–7.4)
NEUTROPHILS NFR BLD AUTO: 48.8 % — SIGNIFICANT CHANGE UP (ref 43–77)
NRBC BLD AUTO-RTO: 0 /100 WBCS — SIGNIFICANT CHANGE UP (ref 0–0)
PHOSPHATE SERPL-MCNC: 4.3 MG/DL — SIGNIFICANT CHANGE UP (ref 2.5–4.5)
PLATELET # BLD AUTO: 377 K/UL — SIGNIFICANT CHANGE UP (ref 150–400)
POTASSIUM SERPL-MCNC: 4 MMOL/L — SIGNIFICANT CHANGE UP (ref 3.5–5.3)
POTASSIUM SERPL-SCNC: 4 MMOL/L — SIGNIFICANT CHANGE UP (ref 3.5–5.3)
PROT SERPL-MCNC: 7 G/DL — SIGNIFICANT CHANGE UP (ref 6–8.3)
RBC # BLD: 3.65 M/UL — LOW (ref 3.8–5.2)
RBC # FLD: 12.8 % — SIGNIFICANT CHANGE UP (ref 10.3–14.5)
SODIUM SERPL-SCNC: 135 MMOL/L — SIGNIFICANT CHANGE UP (ref 135–145)
WBC # BLD: 8.95 K/UL — SIGNIFICANT CHANGE UP (ref 3.8–10.5)
WBC # FLD AUTO: 8.95 K/UL — SIGNIFICANT CHANGE UP (ref 3.8–10.5)

## 2025-08-29 PROCEDURE — 96374 THER/PROPH/DIAG INJ IV PUSH: CPT

## 2025-08-29 PROCEDURE — 84702 CHORIONIC GONADOTROPIN TEST: CPT

## 2025-08-29 PROCEDURE — 84100 ASSAY OF PHOSPHORUS: CPT

## 2025-08-29 PROCEDURE — 85025 COMPLETE CBC W/AUTO DIFF WBC: CPT

## 2025-08-29 PROCEDURE — 83036 HEMOGLOBIN GLYCOSYLATED A1C: CPT

## 2025-08-29 PROCEDURE — 81003 URINALYSIS AUTO W/O SCOPE: CPT

## 2025-08-29 PROCEDURE — 85730 THROMBOPLASTIN TIME PARTIAL: CPT

## 2025-08-29 PROCEDURE — 84436 ASSAY OF TOTAL THYROXINE: CPT

## 2025-08-29 PROCEDURE — 99239 HOSP IP/OBS DSCHRG MGMT >30: CPT

## 2025-08-29 PROCEDURE — 83735 ASSAY OF MAGNESIUM: CPT

## 2025-08-29 PROCEDURE — 87637 SARSCOV2&INF A&B&RSV AMP PRB: CPT

## 2025-08-29 PROCEDURE — 93970 EXTREMITY STUDY: CPT

## 2025-08-29 PROCEDURE — 36415 COLL VENOUS BLD VENIPUNCTURE: CPT

## 2025-08-29 PROCEDURE — 84295 ASSAY OF SERUM SODIUM: CPT

## 2025-08-29 PROCEDURE — 82947 ASSAY GLUCOSE BLOOD QUANT: CPT

## 2025-08-29 PROCEDURE — 84132 ASSAY OF SERUM POTASSIUM: CPT

## 2025-08-29 PROCEDURE — 80053 COMPREHEN METABOLIC PANEL: CPT

## 2025-08-29 PROCEDURE — 84156 ASSAY OF PROTEIN URINE: CPT

## 2025-08-29 PROCEDURE — 85610 PROTHROMBIN TIME: CPT

## 2025-08-29 PROCEDURE — 99285 EMERGENCY DEPT VISIT HI MDM: CPT

## 2025-08-29 PROCEDURE — 71045 X-RAY EXAM CHEST 1 VIEW: CPT

## 2025-08-29 PROCEDURE — 82803 BLOOD GASES ANY COMBINATION: CPT

## 2025-08-29 PROCEDURE — 82962 GLUCOSE BLOOD TEST: CPT

## 2025-08-29 PROCEDURE — 93005 ELECTROCARDIOGRAM TRACING: CPT

## 2025-08-29 PROCEDURE — 82330 ASSAY OF CALCIUM: CPT

## 2025-08-29 PROCEDURE — 82570 ASSAY OF URINE CREATININE: CPT

## 2025-08-29 PROCEDURE — 84484 ASSAY OF TROPONIN QUANT: CPT

## 2025-08-29 PROCEDURE — 87086 URINE CULTURE/COLONY COUNT: CPT

## 2025-08-29 PROCEDURE — 83880 ASSAY OF NATRIURETIC PEPTIDE: CPT

## 2025-08-29 PROCEDURE — 99232 SBSQ HOSP IP/OBS MODERATE 35: CPT

## 2025-08-29 PROCEDURE — 83605 ASSAY OF LACTIC ACID: CPT

## 2025-08-29 PROCEDURE — 84443 ASSAY THYROID STIM HORMONE: CPT

## 2025-08-29 RX ORDER — HYDROMORPHONE/SOD CHLOR,ISO/PF 2 MG/10 ML
2 SYRINGE (ML) INJECTION EVERY 4 HOURS
Refills: 0 | Status: DISCONTINUED | OUTPATIENT
Start: 2025-08-29 | End: 2025-08-29

## 2025-08-29 RX ORDER — HYDROMORPHONE/SOD CHLOR,ISO/PF 2 MG/10 ML
1 SYRINGE (ML) INJECTION ONCE
Refills: 0 | Status: DISCONTINUED | OUTPATIENT
Start: 2025-08-29 | End: 2025-08-29

## 2025-08-29 RX ORDER — OXYCODONE HYDROCHLORIDE 30 MG/1
1 TABLET ORAL
Qty: 20 | Refills: 0
Start: 2025-08-29 | End: 2025-09-02

## 2025-08-29 RX ORDER — OXYCODONE HYDROCHLORIDE 30 MG/1
5 TABLET ORAL
Qty: 100 | Refills: 0
Start: 2025-08-29 | End: 2025-09-02

## 2025-08-29 RX ORDER — LIDOCAINE HYDROCHLORIDE 20 MG/ML
1 JELLY TOPICAL
Qty: 30 | Refills: 0
Start: 2025-08-29 | End: 2025-09-27

## 2025-08-29 RX ORDER — OXYCODONE HYDROCHLORIDE 30 MG/1
25 TABLET ORAL EVERY 6 HOURS
Refills: 0 | Status: DISCONTINUED | OUTPATIENT
Start: 2025-08-29 | End: 2025-08-29

## 2025-08-29 RX ADMIN — POLYETHYLENE GLYCOL 3350 17 GRAM(S): 17 POWDER, FOR SOLUTION ORAL at 12:02

## 2025-08-29 RX ADMIN — INSULIN LISPRO 10 UNIT(S): 100 INJECTION, SOLUTION INTRAVENOUS; SUBCUTANEOUS at 08:23

## 2025-08-29 RX ADMIN — Medication 1000 MILLIGRAM(S): at 06:10

## 2025-08-29 RX ADMIN — Medication 1 MILLIGRAM(S): at 12:02

## 2025-08-29 RX ADMIN — Medication 1 MILLIGRAM(S): at 13:00

## 2025-08-29 RX ADMIN — OXYCODONE HYDROCHLORIDE 20 MILLIGRAM(S): 30 TABLET ORAL at 10:04

## 2025-08-29 RX ADMIN — MONTELUKAST SODIUM 10 MILLIGRAM(S): 10 TABLET ORAL at 12:02

## 2025-08-29 RX ADMIN — INSULIN LISPRO 2: 100 INJECTION, SOLUTION INTRAVENOUS; SUBCUTANEOUS at 17:31

## 2025-08-29 RX ADMIN — ENOXAPARIN SODIUM 40 MILLIGRAM(S): 100 INJECTION SUBCUTANEOUS at 17:32

## 2025-08-29 RX ADMIN — OXYCODONE HYDROCHLORIDE 20 MILLIGRAM(S): 30 TABLET ORAL at 02:29

## 2025-08-29 RX ADMIN — PREGABALIN 100 MILLIGRAM(S): 50 CAPSULE ORAL at 06:10

## 2025-08-29 RX ADMIN — OXYCODONE HYDROCHLORIDE 20 MILLIGRAM(S): 30 TABLET ORAL at 03:00

## 2025-08-29 RX ADMIN — LIDOCAINE HYDROCHLORIDE 2 PATCH: 20 JELLY TOPICAL at 12:01

## 2025-08-29 RX ADMIN — INSULIN LISPRO 2: 100 INJECTION, SOLUTION INTRAVENOUS; SUBCUTANEOUS at 08:24

## 2025-08-29 RX ADMIN — INSULIN LISPRO 10 UNIT(S): 100 INJECTION, SOLUTION INTRAVENOUS; SUBCUTANEOUS at 12:21

## 2025-08-29 RX ADMIN — BUMETANIDE 2 MILLIGRAM(S): 1 TABLET ORAL at 06:10

## 2025-08-29 RX ADMIN — Medication 40 MILLIGRAM(S): at 06:09

## 2025-08-29 RX ADMIN — Medication 1000 MILLIGRAM(S): at 13:24

## 2025-08-29 RX ADMIN — INSULIN LISPRO 3: 100 INJECTION, SOLUTION INTRAVENOUS; SUBCUTANEOUS at 12:20

## 2025-08-29 RX ADMIN — ENOXAPARIN SODIUM 40 MILLIGRAM(S): 100 INJECTION SUBCUTANEOUS at 06:09

## 2025-08-29 RX ADMIN — OXYCODONE HYDROCHLORIDE 20 MILLIGRAM(S): 30 TABLET ORAL at 09:04

## 2025-08-29 RX ADMIN — PREGABALIN 100 MILLIGRAM(S): 50 CAPSULE ORAL at 13:24

## 2025-08-29 RX ADMIN — BACLOFEN 20 MILLIGRAM(S): 10 INJECTION INTRATHECAL at 10:56

## 2025-08-29 RX ADMIN — LIDOCAINE HYDROCHLORIDE 2 PATCH: 20 JELLY TOPICAL at 06:13

## 2025-08-29 RX ADMIN — INSULIN LISPRO 10 UNIT(S): 100 INJECTION, SOLUTION INTRAVENOUS; SUBCUTANEOUS at 17:31

## 2025-08-29 RX ADMIN — Medication 1000 MILLIGRAM(S): at 14:20

## 2025-09-04 ENCOUNTER — NON-APPOINTMENT (OUTPATIENT)
Age: 55
End: 2025-09-04

## 2025-09-04 DIAGNOSIS — K58.9 IRRITABLE BOWEL SYNDROME, UNSPECIFIED: ICD-10-CM

## 2025-09-04 RX ORDER — DICYCLOMINE HYDROCHLORIDE 20 MG/1
20 TABLET ORAL EVERY 6 HOURS
Qty: 120 | Refills: 0 | Status: ACTIVE | COMMUNITY
Start: 2025-09-04 | End: 1900-01-01

## 2025-09-16 ENCOUNTER — APPOINTMENT (OUTPATIENT)
Dept: HOME HEALTH SERVICES | Facility: HOME HEALTH | Age: 55
End: 2025-09-16

## 2025-09-17 ENCOUNTER — TRANSCRIPTION ENCOUNTER (OUTPATIENT)
Age: 55
End: 2025-09-17

## 2025-09-18 ENCOUNTER — NON-APPOINTMENT (OUTPATIENT)
Age: 55
End: 2025-09-18

## 2025-09-18 RX ORDER — BACLOFEN 10 MG/20ML
1 INJECTION INTRATHECAL
Refills: 0 | DISCHARGE

## 2025-09-18 RX ORDER — MONTELUKAST SODIUM 10 MG/1
1 TABLET ORAL
Refills: 0 | DISCHARGE

## 2025-09-18 RX ORDER — LATANOPROST PF 0.05 MG/ML
1 SOLUTION/ DROPS OPHTHALMIC
Refills: 0 | DISCHARGE

## 2025-09-18 RX ORDER — ATORVASTATIN CALCIUM 80 MG/1
1 TABLET, FILM COATED ORAL
Refills: 0 | DISCHARGE

## 2025-09-18 RX ORDER — PREGABALIN 50 MG/1
1 CAPSULE ORAL
Refills: 0 | DISCHARGE

## 2025-09-18 RX ORDER — INSULIN ASPART 100 [IU]/ML
12 INJECTION, SOLUTION INTRAVENOUS; SUBCUTANEOUS
Refills: 0 | DISCHARGE

## 2025-09-18 RX ORDER — METFORMIN HYDROCHLORIDE 850 MG/1
1 TABLET ORAL
Refills: 0 | DISCHARGE

## 2025-09-18 RX ORDER — ALPRAZOLAM 0.5 MG
1 TABLET, EXTENDED RELEASE 24 HR ORAL
Refills: 0 | DISCHARGE

## (undated) DEVICE — SYR LUER LOK 10CC

## (undated) DEVICE — BIPOLAR FORCEP KIRWAN CUSHING 6" X 1MM W 12FT CORD (GREEN)

## (undated) DEVICE — GLV 7.5 PROTEXIS (WHITE)

## (undated) DEVICE — SUT VICRYL PLUS 0 27" UR-5

## (undated) DEVICE — DRAPE SPLIT SHEET 77" X 108"

## (undated) DEVICE — INFUS SYS IMP CATH SYNCHROMED

## (undated) DEVICE — ULTRASOUND PROBE COVER NEOGAURD LATEX FREE

## (undated) DEVICE — SUT VICRYL 3-0 18" SH UNDYED (POP-OFF)

## (undated) DEVICE — BASIN SET DOUBLE

## (undated) DEVICE — DRSG DERMABOND 0.7ML

## (undated) DEVICE — SUT ETHIBOND 2-0 30" CT-1

## (undated) DEVICE — DRAPE C ARM 41X74"

## (undated) DEVICE — SUT VICRYL 3-0 18" SH (POP-OFF)

## (undated) DEVICE — PREP BETADINE KIT

## (undated) DEVICE — GLV 6.5 PROTEXIS (WHITE)

## (undated) DEVICE — SUT MONOCRYL 3-0 18" PS-1

## (undated) DEVICE — SUT MONOCRYL 4-0 18" P-3 UNDYED

## (undated) DEVICE — SUT VICRYL 2-0 18" CT-1 UNDYED (POP-OFF)

## (undated) DEVICE — PACK GENERAL MINOR

## (undated) DEVICE — SUT SILK 2-0 30" PSL

## (undated) DEVICE — DRSG 4 X 4" 4PLY STERILE

## (undated) DEVICE — VENODYNE/SCD SLEEVE CALF MEDIUM

## (undated) DEVICE — DRAPE TOP SHEET 53" X 101"

## (undated) DEVICE — DRAPE IOBAN 23" X 23"

## (undated) DEVICE — DRSG TEGADERM 4 X 10"

## (undated) DEVICE — DRSG TEGADERM 4X10"

## (undated) DEVICE — MARKING PEN W RULER

## (undated) DEVICE — SYR LUER LOK 3CC

## (undated) DEVICE — DRAPE SPLIT SHEET 77" X 120"

## (undated) DEVICE — DRSG AQUACEL 3.5 X 10"

## (undated) DEVICE — SUT PROLENE 4-0 18" PS-2

## (undated) DEVICE — SUT ETHIBOND 2-0 30" RB-1

## (undated) DEVICE — SUT ETHIBOND EXCEL 2-0 30"

## (undated) DEVICE — DRAPE C ARM C-ARMOUR

## (undated) DEVICE — SUT VICRYL 3-0 27" SH UNDYED

## (undated) DEVICE — SYR LOR GLASS LUER SLIP 5CC

## (undated) DEVICE — ELCTR BOVIE TIP BLADE INSULATED 2.75" EDGE

## (undated) DEVICE — PREP CHLORAPREP HI-LITE ORANGE 26ML

## (undated) DEVICE — POSITIONER MATTRESS HOVERMAT SPU LINK 39"

## (undated) DEVICE — STAPLER SKIN VISI-STAT 35 WIDE

## (undated) DEVICE — ELCTR MONOPOLAR STIMULATOR PROBE FLUSH-TIP

## (undated) DEVICE — ELCTR PLASMA BLADE X 3.0S WIDE TIP

## (undated) DEVICE — GLV 6.5 PROTEXIS (BLUE)

## (undated) DEVICE — WARMING BLANKET FULL UNDERBODY

## (undated) DEVICE — PACK MINOR WITH LAP

## (undated) DEVICE — MEDTRONIC CATHETER PASSER 38CM

## (undated) DEVICE — SPECIMEN CONTAINER 100ML

## (undated) DEVICE — SUT VICRYL 2-0 27" SH UNDYED

## (undated) DEVICE — ELCTR COLORADO 3CM

## (undated) DEVICE — ELCTR GROUNDING PAD ADULT COVIDIEN

## (undated) DEVICE — REFIL TY PMP SYNCHROMED

## (undated) DEVICE — VESSEL LOOP MINI-BLUE 0.075" X 16"

## (undated) DEVICE — LABELS BLANK W PEN

## (undated) DEVICE — DRSG TEGADERM 2.5X3"

## (undated) DEVICE — POSITIONER STRAP ARMBOARD VELCRO TS-30

## (undated) DEVICE — ELCTR BOVIE TIP NEEDLE INSULATED 2.8" EDGE